# Patient Record
Sex: MALE | Race: BLACK OR AFRICAN AMERICAN | NOT HISPANIC OR LATINO | URBAN - METROPOLITAN AREA
[De-identification: names, ages, dates, MRNs, and addresses within clinical notes are randomized per-mention and may not be internally consistent; named-entity substitution may affect disease eponyms.]

---

## 2018-01-16 ENCOUNTER — INPATIENT (INPATIENT)
Facility: HOSPITAL | Age: 73
LOS: 7 days | Discharge: HOME | End: 2018-01-24
Attending: INTERNAL MEDICINE | Admitting: INTERNAL MEDICINE
Payer: MEDICARE

## 2018-01-16 VITALS — WEIGHT: 270.07 LBS | HEIGHT: 73 IN

## 2018-01-16 DIAGNOSIS — Z95.0 PRESENCE OF CARDIAC PACEMAKER: Chronic | ICD-10-CM

## 2018-01-16 DIAGNOSIS — Z98.89 OTHER SPECIFIED POSTPROCEDURAL STATES: Chronic | ICD-10-CM

## 2018-01-16 DIAGNOSIS — Z95.1 PRESENCE OF AORTOCORONARY BYPASS GRAFT: Chronic | ICD-10-CM

## 2018-01-16 LAB
ALBUMIN SERPL ELPH-MCNC: 3.8 G/DL — SIGNIFICANT CHANGE UP (ref 3.3–5)
ALP SERPL-CCNC: 91 U/L — SIGNIFICANT CHANGE UP (ref 40–120)
ALT FLD-CCNC: 34 U/L — SIGNIFICANT CHANGE UP (ref 12–78)
ANION GAP SERPL CALC-SCNC: 9 MMOL/L — SIGNIFICANT CHANGE UP (ref 5–17)
APTT BLD: 33.5 SEC — SIGNIFICANT CHANGE UP (ref 27.5–37.4)
AST SERPL-CCNC: 39 U/L — HIGH (ref 15–37)
BASE EXCESS BLDA CALC-SCNC: 1.5 MMOL/L — SIGNIFICANT CHANGE UP (ref -2–2)
BASOPHILS # BLD AUTO: 0.1 K/UL — SIGNIFICANT CHANGE UP (ref 0–0.2)
BASOPHILS NFR BLD AUTO: 0.8 % — SIGNIFICANT CHANGE UP (ref 0–2)
BILIRUB SERPL-MCNC: 0.5 MG/DL — SIGNIFICANT CHANGE UP (ref 0.2–1.2)
BLOOD GAS COMMENTS: SIGNIFICANT CHANGE UP
BLOOD GAS COMMENTS: SIGNIFICANT CHANGE UP
BLOOD GAS SOURCE: SIGNIFICANT CHANGE UP
BUN SERPL-MCNC: 40 MG/DL — HIGH (ref 7–23)
CALCIUM SERPL-MCNC: 8.2 MG/DL — LOW (ref 8.5–10.1)
CHLORIDE SERPL-SCNC: 103 MMOL/L — SIGNIFICANT CHANGE UP (ref 96–108)
CK MB BLD-MCNC: 0.6 % — SIGNIFICANT CHANGE UP (ref 0–3.5)
CK MB CFR SERPL CALC: 3.1 NG/ML — SIGNIFICANT CHANGE UP (ref 0.5–3.6)
CK SERPL-CCNC: 481 U/L — HIGH (ref 26–308)
CO2 SERPL-SCNC: 25 MMOL/L — SIGNIFICANT CHANGE UP (ref 22–31)
CREAT SERPL-MCNC: 2.1 MG/DL — HIGH (ref 0.5–1.3)
EOSINOPHIL # BLD AUTO: 0.1 K/UL — SIGNIFICANT CHANGE UP (ref 0–0.5)
EOSINOPHIL NFR BLD AUTO: 0.8 % — SIGNIFICANT CHANGE UP (ref 0–6)
GLUCOSE SERPL-MCNC: 358 MG/DL — HIGH (ref 70–99)
HCO3 BLDA-SCNC: 27 MMOL/L — SIGNIFICANT CHANGE UP (ref 21–29)
HCT VFR BLD CALC: 42.7 % — SIGNIFICANT CHANGE UP (ref 39–50)
HGB BLD-MCNC: 13.9 G/DL — SIGNIFICANT CHANGE UP (ref 13–17)
HOROWITZ INDEX BLDA+IHG-RTO: 80 — SIGNIFICANT CHANGE UP
INR BLD: 2.19 RATIO — HIGH (ref 0.88–1.16)
LYMPHOCYTES # BLD AUTO: 0.8 K/UL — LOW (ref 1–3.3)
LYMPHOCYTES # BLD AUTO: 7.4 % — LOW (ref 13–44)
MCHC RBC-ENTMCNC: 30.7 PG — SIGNIFICANT CHANGE UP (ref 27–34)
MCHC RBC-ENTMCNC: 32.6 GM/DL — SIGNIFICANT CHANGE UP (ref 32–36)
MCV RBC AUTO: 94.1 FL — SIGNIFICANT CHANGE UP (ref 80–100)
MONOCYTES # BLD AUTO: 0.6 K/UL — SIGNIFICANT CHANGE UP (ref 0–0.9)
MONOCYTES NFR BLD AUTO: 6 % — SIGNIFICANT CHANGE UP (ref 2–14)
NEUTROPHILS # BLD AUTO: 9.2 K/UL — HIGH (ref 1.8–7.4)
NEUTROPHILS NFR BLD AUTO: 85 % — HIGH (ref 43–77)
NT-PROBNP SERPL-SCNC: 2177 PG/ML — HIGH (ref 0–125)
PCO2 BLDA: 46 MMHG — SIGNIFICANT CHANGE UP (ref 32–46)
PH BLD: 7.38 — SIGNIFICANT CHANGE UP (ref 7.35–7.45)
PLATELET # BLD AUTO: 121 K/UL — LOW (ref 150–400)
PO2 BLDA: 269 MMHG — HIGH (ref 74–108)
POTASSIUM SERPL-MCNC: 4.6 MMOL/L — SIGNIFICANT CHANGE UP (ref 3.5–5.3)
POTASSIUM SERPL-SCNC: 4.6 MMOL/L — SIGNIFICANT CHANGE UP (ref 3.5–5.3)
PROT SERPL-MCNC: 8 GM/DL — SIGNIFICANT CHANGE UP (ref 6–8.3)
PROTHROM AB SERPL-ACNC: 24.3 SEC — HIGH (ref 9.8–12.7)
RBC # BLD: 4.54 M/UL — SIGNIFICANT CHANGE UP (ref 4.2–5.8)
RBC # FLD: 15.4 % — HIGH (ref 11–15)
SAO2 % BLDA: 99 % — HIGH (ref 92–96)
SODIUM SERPL-SCNC: 137 MMOL/L — SIGNIFICANT CHANGE UP (ref 135–145)
TROPONIN I SERPL-MCNC: 0.12 NG/ML — HIGH (ref 0.01–0.04)
WBC # BLD: 10.8 K/UL — HIGH (ref 3.8–10.5)
WBC # FLD AUTO: 10.8 K/UL — HIGH (ref 3.8–10.5)

## 2018-01-16 PROCEDURE — 71045 X-RAY EXAM CHEST 1 VIEW: CPT | Mod: 26

## 2018-01-16 PROCEDURE — 99291 CRITICAL CARE FIRST HOUR: CPT

## 2018-01-16 RX ORDER — NITROGLYCERIN 6.5 MG
20 CAPSULE, EXTENDED RELEASE ORAL
Qty: 50 | Refills: 0 | Status: DISCONTINUED | OUTPATIENT
Start: 2018-01-16 | End: 2018-01-17

## 2018-01-16 RX ORDER — FUROSEMIDE 40 MG
40 TABLET ORAL ONCE
Qty: 0 | Refills: 0 | Status: COMPLETED | OUTPATIENT
Start: 2018-01-16 | End: 2018-01-16

## 2018-01-16 RX ORDER — SODIUM CHLORIDE 9 MG/ML
3 INJECTION INTRAMUSCULAR; INTRAVENOUS; SUBCUTANEOUS ONCE
Qty: 0 | Refills: 0 | Status: COMPLETED | OUTPATIENT
Start: 2018-01-16 | End: 2018-01-16

## 2018-01-16 RX ORDER — ASPIRIN/CALCIUM CARB/MAGNESIUM 324 MG
325 TABLET ORAL ONCE
Qty: 0 | Refills: 0 | Status: COMPLETED | OUTPATIENT
Start: 2018-01-16 | End: 2018-01-16

## 2018-01-16 RX ADMIN — Medication 325 MILLIGRAM(S): at 22:50

## 2018-01-16 RX ADMIN — SODIUM CHLORIDE 3 MILLILITER(S): 9 INJECTION INTRAMUSCULAR; INTRAVENOUS; SUBCUTANEOUS at 22:14

## 2018-01-16 RX ADMIN — Medication 6 MICROGRAM(S)/MIN: at 22:14

## 2018-01-16 RX ADMIN — Medication 40 MILLIGRAM(S): at 22:50

## 2018-01-16 NOTE — ED ADULT NURSE NOTE - PSH
S/P CABG x 3  cabg3  in 2003  S/P cardiac cath  cardiac stentin 2011  S/P cardiac pacemaker procedure  defibrilator 2012  S/P hernia repair  hernia [id number]

## 2018-01-16 NOTE — ED ADULT NURSE NOTE - PMH
CHF (congestive heart failure)    COPD (chronic obstructive pulmonary disease)    DM (diabetes mellitus), type 2    High cholesterol    HTN (hypertension)    Pneumonia

## 2018-01-17 DIAGNOSIS — E11.9 TYPE 2 DIABETES MELLITUS WITHOUT COMPLICATIONS: ICD-10-CM

## 2018-01-17 DIAGNOSIS — E78.2 MIXED HYPERLIPIDEMIA: ICD-10-CM

## 2018-01-17 DIAGNOSIS — E66.01 MORBID (SEVERE) OBESITY DUE TO EXCESS CALORIES: ICD-10-CM

## 2018-01-17 DIAGNOSIS — G47.33 OBSTRUCTIVE SLEEP APNEA (ADULT) (PEDIATRIC): ICD-10-CM

## 2018-01-17 DIAGNOSIS — I16.1 HYPERTENSIVE EMERGENCY: ICD-10-CM

## 2018-01-17 DIAGNOSIS — I10 ESSENTIAL (PRIMARY) HYPERTENSION: ICD-10-CM

## 2018-01-17 DIAGNOSIS — I48.2 CHRONIC ATRIAL FIBRILLATION: ICD-10-CM

## 2018-01-17 DIAGNOSIS — I50.23 ACUTE ON CHRONIC SYSTOLIC (CONGESTIVE) HEART FAILURE: ICD-10-CM

## 2018-01-17 DIAGNOSIS — Z29.9 ENCOUNTER FOR PROPHYLACTIC MEASURES, UNSPECIFIED: ICD-10-CM

## 2018-01-17 LAB
ANION GAP SERPL CALC-SCNC: 9 MMOL/L — SIGNIFICANT CHANGE UP (ref 5–17)
BUN SERPL-MCNC: 45 MG/DL — HIGH (ref 7–23)
CALCIUM SERPL-MCNC: 8.6 MG/DL — SIGNIFICANT CHANGE UP (ref 8.5–10.1)
CHLORIDE SERPL-SCNC: 101 MMOL/L — SIGNIFICANT CHANGE UP (ref 96–108)
CHOLEST SERPL-MCNC: 124 MG/DL — SIGNIFICANT CHANGE UP (ref 10–199)
CK MB BLD-MCNC: 1.5 % — SIGNIFICANT CHANGE UP (ref 0–3.5)
CK MB BLD-MCNC: 1.7 % — SIGNIFICANT CHANGE UP (ref 0–3.5)
CK MB CFR SERPL CALC: 10.1 NG/ML — HIGH (ref 0.5–3.6)
CK MB CFR SERPL CALC: 8.6 NG/ML — HIGH (ref 0.5–3.6)
CK SERPL-CCNC: 495 U/L — HIGH (ref 26–308)
CK SERPL-CCNC: 659 U/L — HIGH (ref 26–308)
CO2 SERPL-SCNC: 28 MMOL/L — SIGNIFICANT CHANGE UP (ref 22–31)
CREAT SERPL-MCNC: 2.5 MG/DL — HIGH (ref 0.5–1.3)
GLUCOSE SERPL-MCNC: 279 MG/DL — HIGH (ref 70–99)
HBA1C BLD-MCNC: 9.8 % — HIGH (ref 4–5.6)
HCT VFR BLD CALC: 44 % — SIGNIFICANT CHANGE UP (ref 39–50)
HDLC SERPL-MCNC: 33 MG/DL — LOW (ref 40–125)
HGB BLD-MCNC: 14 G/DL — SIGNIFICANT CHANGE UP (ref 13–17)
LIPID PNL WITH DIRECT LDL SERPL: 73 MG/DL — SIGNIFICANT CHANGE UP
MCHC RBC-ENTMCNC: 29.8 PG — SIGNIFICANT CHANGE UP (ref 27–34)
MCHC RBC-ENTMCNC: 31.8 GM/DL — LOW (ref 32–36)
MCV RBC AUTO: 93.9 FL — SIGNIFICANT CHANGE UP (ref 80–100)
PLATELET # BLD AUTO: 137 K/UL — LOW (ref 150–400)
POTASSIUM SERPL-MCNC: 4.4 MMOL/L — SIGNIFICANT CHANGE UP (ref 3.5–5.3)
POTASSIUM SERPL-SCNC: 4.4 MMOL/L — SIGNIFICANT CHANGE UP (ref 3.5–5.3)
RBC # BLD: 4.69 M/UL — SIGNIFICANT CHANGE UP (ref 4.2–5.8)
RBC # FLD: 15.9 % — HIGH (ref 11–15)
SODIUM SERPL-SCNC: 138 MMOL/L — SIGNIFICANT CHANGE UP (ref 135–145)
TOTAL CHOLESTEROL/HDL RATIO MEASUREMENT: 3.8 RATIO — SIGNIFICANT CHANGE UP (ref 3.4–9.6)
TRIGL SERPL-MCNC: 91 MG/DL — SIGNIFICANT CHANGE UP (ref 10–149)
TROPONIN I SERPL-MCNC: 0.38 NG/ML — HIGH (ref 0.01–0.04)
TROPONIN I SERPL-MCNC: 0.42 NG/ML — HIGH (ref 0.01–0.04)
WBC # BLD: 10.7 K/UL — HIGH (ref 3.8–10.5)
WBC # FLD AUTO: 10.7 K/UL — HIGH (ref 3.8–10.5)

## 2018-01-17 PROCEDURE — 12345: CPT | Mod: NC

## 2018-01-17 PROCEDURE — 99223 1ST HOSP IP/OBS HIGH 75: CPT

## 2018-01-17 RX ORDER — FUROSEMIDE 40 MG
40 TABLET ORAL EVERY 12 HOURS
Qty: 0 | Refills: 0 | Status: DISCONTINUED | OUTPATIENT
Start: 2018-01-17 | End: 2018-01-24

## 2018-01-17 RX ORDER — DEXTROSE 50 % IN WATER 50 %
25 SYRINGE (ML) INTRAVENOUS ONCE
Qty: 0 | Refills: 0 | Status: DISCONTINUED | OUTPATIENT
Start: 2018-01-17 | End: 2018-01-24

## 2018-01-17 RX ORDER — METOPROLOL TARTRATE 50 MG
75 TABLET ORAL DAILY
Qty: 0 | Refills: 0 | Status: DISCONTINUED | OUTPATIENT
Start: 2018-01-17 | End: 2018-01-24

## 2018-01-17 RX ORDER — CHOLECALCIFEROL (VITAMIN D3) 125 MCG
400 CAPSULE ORAL DAILY
Qty: 0 | Refills: 0 | Status: DISCONTINUED | OUTPATIENT
Start: 2018-01-17 | End: 2018-01-24

## 2018-01-17 RX ORDER — DEXTROSE 50 % IN WATER 50 %
12.5 SYRINGE (ML) INTRAVENOUS ONCE
Qty: 0 | Refills: 0 | Status: DISCONTINUED | OUTPATIENT
Start: 2018-01-17 | End: 2018-01-24

## 2018-01-17 RX ORDER — SIMVASTATIN 20 MG/1
40 TABLET, FILM COATED ORAL AT BEDTIME
Qty: 0 | Refills: 0 | Status: DISCONTINUED | OUTPATIENT
Start: 2018-01-17 | End: 2018-01-24

## 2018-01-17 RX ORDER — SODIUM CHLORIDE 9 MG/ML
1000 INJECTION, SOLUTION INTRAVENOUS
Qty: 0 | Refills: 0 | Status: DISCONTINUED | OUTPATIENT
Start: 2018-01-17 | End: 2018-01-24

## 2018-01-17 RX ORDER — FAMOTIDINE 10 MG/ML
20 INJECTION INTRAVENOUS
Qty: 0 | Refills: 0 | Status: DISCONTINUED | OUTPATIENT
Start: 2018-01-17 | End: 2018-01-24

## 2018-01-17 RX ORDER — RIVAROXABAN 15 MG-20MG
20 KIT ORAL EVERY 24 HOURS
Qty: 0 | Refills: 0 | Status: DISCONTINUED | OUTPATIENT
Start: 2018-01-17 | End: 2018-01-24

## 2018-01-17 RX ORDER — SACUBITRIL AND VALSARTAN 24; 26 MG/1; MG/1
1 TABLET, FILM COATED ORAL
Qty: 0 | Refills: 0 | Status: DISCONTINUED | OUTPATIENT
Start: 2018-01-17 | End: 2018-01-24

## 2018-01-17 RX ORDER — ALLOPURINOL 300 MG
100 TABLET ORAL DAILY
Qty: 0 | Refills: 0 | Status: DISCONTINUED | OUTPATIENT
Start: 2018-01-17 | End: 2018-01-24

## 2018-01-17 RX ORDER — IPRATROPIUM/ALBUTEROL SULFATE 18-103MCG
3 AEROSOL WITH ADAPTER (GRAM) INHALATION EVERY 6 HOURS
Qty: 0 | Refills: 0 | Status: DISCONTINUED | OUTPATIENT
Start: 2018-01-17 | End: 2018-01-18

## 2018-01-17 RX ORDER — INSULIN LISPRO 100/ML
VIAL (ML) SUBCUTANEOUS AT BEDTIME
Qty: 0 | Refills: 0 | Status: DISCONTINUED | OUTPATIENT
Start: 2018-01-17 | End: 2018-01-24

## 2018-01-17 RX ORDER — FOLIC ACID 0.8 MG
1 TABLET ORAL DAILY
Qty: 0 | Refills: 0 | Status: DISCONTINUED | OUTPATIENT
Start: 2018-01-17 | End: 2018-01-24

## 2018-01-17 RX ORDER — DEXTROSE 50 % IN WATER 50 %
1 SYRINGE (ML) INTRAVENOUS ONCE
Qty: 0 | Refills: 0 | Status: DISCONTINUED | OUTPATIENT
Start: 2018-01-17 | End: 2018-01-24

## 2018-01-17 RX ORDER — TAMSULOSIN HYDROCHLORIDE 0.4 MG/1
0.4 CAPSULE ORAL AT BEDTIME
Qty: 0 | Refills: 0 | Status: DISCONTINUED | OUTPATIENT
Start: 2018-01-17 | End: 2018-01-24

## 2018-01-17 RX ORDER — ASPIRIN/CALCIUM CARB/MAGNESIUM 324 MG
325 TABLET ORAL DAILY
Qty: 0 | Refills: 0 | Status: DISCONTINUED | OUTPATIENT
Start: 2018-01-17 | End: 2018-01-18

## 2018-01-17 RX ORDER — INSULIN GLARGINE 100 [IU]/ML
53 INJECTION, SOLUTION SUBCUTANEOUS AT BEDTIME
Qty: 0 | Refills: 0 | Status: DISCONTINUED | OUTPATIENT
Start: 2018-01-17 | End: 2018-01-24

## 2018-01-17 RX ORDER — GLUCAGON INJECTION, SOLUTION 0.5 MG/.1ML
1 INJECTION, SOLUTION SUBCUTANEOUS ONCE
Qty: 0 | Refills: 0 | Status: DISCONTINUED | OUTPATIENT
Start: 2018-01-17 | End: 2018-01-24

## 2018-01-17 RX ORDER — OMEGA-3 ACID ETHYL ESTERS 1 G
2 CAPSULE ORAL
Qty: 0 | Refills: 0 | Status: DISCONTINUED | OUTPATIENT
Start: 2018-01-17 | End: 2018-01-17

## 2018-01-17 RX ORDER — INSULIN LISPRO 100/ML
VIAL (ML) SUBCUTANEOUS
Qty: 0 | Refills: 0 | Status: DISCONTINUED | OUTPATIENT
Start: 2018-01-17 | End: 2018-01-24

## 2018-01-17 RX ADMIN — SIMVASTATIN 40 MILLIGRAM(S): 20 TABLET, FILM COATED ORAL at 21:46

## 2018-01-17 RX ADMIN — Medication 100 MILLIGRAM(S): at 12:08

## 2018-01-17 RX ADMIN — FAMOTIDINE 20 MILLIGRAM(S): 10 INJECTION INTRAVENOUS at 17:23

## 2018-01-17 RX ADMIN — Medication 400 UNIT(S): at 12:08

## 2018-01-17 RX ADMIN — Medication 325 MILLIGRAM(S): at 12:08

## 2018-01-17 RX ADMIN — Medication 75 MILLIGRAM(S): at 06:51

## 2018-01-17 RX ADMIN — FAMOTIDINE 20 MILLIGRAM(S): 10 INJECTION INTRAVENOUS at 06:51

## 2018-01-17 RX ADMIN — RIVAROXABAN 20 MILLIGRAM(S): KIT at 17:23

## 2018-01-17 RX ADMIN — Medication 1: at 21:46

## 2018-01-17 RX ADMIN — INSULIN GLARGINE 53 UNIT(S): 100 INJECTION, SOLUTION SUBCUTANEOUS at 21:45

## 2018-01-17 RX ADMIN — Medication 1 MILLIGRAM(S): at 12:08

## 2018-01-17 RX ADMIN — TAMSULOSIN HYDROCHLORIDE 0.4 MILLIGRAM(S): 0.4 CAPSULE ORAL at 21:46

## 2018-01-17 RX ADMIN — SACUBITRIL AND VALSARTAN 1 TABLET(S): 24; 26 TABLET, FILM COATED ORAL at 06:51

## 2018-01-17 RX ADMIN — Medication 6: at 08:26

## 2018-01-17 RX ADMIN — Medication 40 MILLIGRAM(S): at 17:23

## 2018-01-17 RX ADMIN — Medication 40 MILLIGRAM(S): at 06:51

## 2018-01-17 RX ADMIN — SACUBITRIL AND VALSARTAN 1 TABLET(S): 24; 26 TABLET, FILM COATED ORAL at 17:22

## 2018-01-17 RX ADMIN — Medication 4: at 13:02

## 2018-01-17 NOTE — CONSULT NOTE ADULT - SUBJECTIVE AND OBJECTIVE BOX
HPI:  HPI:  72 year old man with PMH of CHF, COPD, CKD, EMBER on CPAP, DM2, HTN, HLD presents to ED with worsening SOB and leg swelling.  Patient has SOB at baseline which worsened tonight with swelling in both of his legs.  He denies chest pain, palpitation, lightheadedness, diaphoresis.  He is compliant with all medications and sees his doctors regularly.    In the ED, BNP 2177, troponin mildly elevated, EKG without ischemic changes; paced.  Labs consistent with CKD, leukocytosis without clear source of infection.  Had been HTN emergency in ED, was on nitro, drip stopped (2018 02:25)      Chief Complaint:  Patient is a 72y old  Male who presents with a chief complaint of Patient was complaining of difficulty breathing. (2018 11:56)      Review of Systems:    see above         Social History/Family History  SOCHX:   tobacco,  -  alcohol    FMHX: FA/MO  - contributory       Discussed with:  PMD, Family    Physical Exam:    Vital Signs:  Vital Signs Last 24 Hrs  T(C): 36.6 (2018 05:27), Max: 37.1 (2018 17:14)  T(F): 97.9 (2018 05:27), Max: 98.7 (2018 17:14)  HR: 61 (2018 05:27) (60 - 62)  BP: 113/61 (2018 05:27) (108/64 - 144/65)  BP(mean): --  RR: 18 (2018 05:27) (18 - 18)  SpO2: 97% (2018 05:27) (94% - 98%)  Daily     Daily Weight in k (2018 05:27)  I&O's Summary    2018 07:01  -  2018 07:00  --------------------------------------------------------  IN: 480 mL / OUT: 0 mL / NET: 480 mL          Chest:  decreased bases  Cardiovascular:  Regular rhythm, S1, S2, no murmur/rub/S3/S4, no carotid/femoral/abdominal bruit, radial/pedal pulses 2+, edema  Abdomen:  Soft, non-tender, non-distended, normoactive bowel sounds, no HSM      Laboratory:                          12.6   8.3   )-----------( 106      ( 2018 07:47 )             38.4     18    139  |  101  |  47<H>  ----------------------------<  186<H>  4.4   |  30  |  2.35<H>    Ca    8.2<L>      2018 07:47  Phos  2.8       Mg     2.2         TPro  7.5  /  Alb  3.4  /  TBili  0.9  /  DBili  x   /  AST  39<H>  /  ALT  28  /  AlkPhos  66  18    ABG - ( 2018 22:41 )  pH: x     /  pCO2: 46    /  pO2: 269   / HCO3: 27    / Base Excess: 1.5   /  SaO2: 99                CARDIAC MARKERS ( 2018 16:18 )  .382 ng/mL / x     / 659 U/L / x     / 10.1 ng/mL  CARDIAC MARKERS ( 2018 08:52 )  .425 ng/mL / x     / 495 U/L / x     / 8.6 ng/mL  CARDIAC MARKERS ( 2018 22:07 )  .125 ng/mL / x     / 481 U/L / x     / 3.1 ng/mL      CAPILLARY BLOOD GLUCOSE      POCT Blood Glucose.: 206 mg/dL (2018 08:05)  POCT Blood Glucose.: 294 mg/dL (2018 21:41)  POCT Blood Glucose.: 148 mg/dL (2018 17:21)  POCT Blood Glucose.: 218 mg/dL (2018 13:00)    LIVER FUNCTIONS - ( 2018 07:47 )  Alb: 3.4 g/dL / Pro: 7.5 gm/dL / ALK PHOS: 66 U/L / ALT: 28 U/L / AST: 39 U/L / GGT: x           PT/INR - ( 2018 23:35 )   PT: 24.3 sec;   INR: 2.19 ratio         PTT - ( 2018 23:35 )  PTT:33.5 sec          Assessment:  Acute on chronic systolic CHF, AICD in place  H/O CAD, last echo  EF 30%  Optimize meds, clinical improvement noted already  rEPEAT tte

## 2018-01-17 NOTE — ED PROVIDER NOTE - CRITICAL CARE PROVIDED
additional history taking/interpretation of diagnostic studies/documentation/direct patient care (not related to procedure)/consult w/ pt's family directly relating to pts condition

## 2018-01-17 NOTE — H&P ADULT - ASSESSMENT
72 year old man with PMH of CHF, COPD, CKD, EMBER on CPAP, DM2, HTN, HLD presents to ED with worsening SOB and leg swelling.  Signs, symptoms, and work up consistent with CHF exacerbation.  Patient will require admission for at least 2 midnights as detailed below:    IMPROVE VTE Individual Risk Assessment          RISK                                                          Points    [  ] Previous VTE                                                3    [  ] Thrombophilia                                             2    [  ] Lower limb paralysis                                    2        (unable to hold up >15 seconds)      [  ] Current Cancer                                             2         (within 6 months)    [x  ] Immobilization > 24 hrs                              1    [  ] ICU/CCU stay > 24 hours                            1    [ x ] Age > 60                                                    1    IMPROVE VTE Score ___2______

## 2018-01-17 NOTE — PROGRESS NOTE ADULT - ASSESSMENT
72 year old man with PMH of chronic Sys CHF, COPD, CKD, EMBER on CPAP, DM2, HTN, HLD presents w/ systolic CHF exacerbation.     Sys CHF exacerbation  - c/w telemetry monitoring  - CXR showed stable cardiomegaly w/ mild pulmonary vascular congestion  - c/w Lasix 40mg IVP Q12hrs but will lower dose if Cr continues to go up  - c/w Entresto, statin & metoprolol  - c/w Strict input/output & daily weight monitoring  - c/w continue BiPAP  - cardiac consult pending  - TTE pending    CAD status post CABG, PCI in 2011 and AICD  - cardiac enzymes borderline w/ trop 0.425, will continue to trend  - cardio consult pending  - c/w ASA    Leukocytosis  - may be reactive  - check UA    Hypertension   - c/w Enteresto & metoprolol    Questionable COPD history   c/w BiPAP & add PRN nebs      DM type 2   - c/w Lantus 53 units SC QHS & Sliding scale with coverage  - Hgb A1C was 9.8      HLD   - c/w simvastatin    Chronic atrial fibrillation  - c/w Xarelto & metoprolol    DVT Prophylaxis: Xarelto   GI prophylaxis: Pepcid

## 2018-01-17 NOTE — PROGRESS NOTE ADULT - SUBJECTIVE AND OBJECTIVE BOX
Patient is a 72y old Male w/ history of CHF, COPD, CKD, EMBER on CPAP, DM2, HTN & HLD who p/w systolic CHF exacerbation.     INTERVAL HPI/OVERNIGHT EVENTS:     MEDICATIONS  (STANDING):  allopurinol 100 milliGRAM(s) Oral daily  aspirin 325 milliGRAM(s) Oral daily  cholecalciferol 400 Unit(s) Oral daily  dextrose 5%. 1000 milliLiter(s) (50 mL/Hr) IV Continuous <Continuous>  dextrose 50% Injectable 12.5 Gram(s) IV Push once  dextrose 50% Injectable 25 Gram(s) IV Push once  dextrose 50% Injectable 25 Gram(s) IV Push once  famotidine    Tablet 20 milliGRAM(s) Oral two times a day  folic acid 1 milliGRAM(s) Oral daily  furosemide   Injectable 40 milliGRAM(s) IV Push every 12 hours  insulin glargine Injectable (LANTUS) 53 Unit(s) SubCutaneous at bedtime  insulin lispro (HumaLOG) corrective regimen sliding scale   SubCutaneous three times a day before meals  insulin lispro (HumaLOG) corrective regimen sliding scale   SubCutaneous at bedtime  metoprolol succinate ER 75 milliGRAM(s) Oral daily  rivaroxaban 20 milliGRAM(s) Oral every 24 hours  sacubitril 24 mG/valsartan 26 mG 1 Tablet(s) Oral two times a day  simvastatin 40 milliGRAM(s) Oral at bedtime  tamsulosin 0.4 milliGRAM(s) Oral at bedtime    MEDICATIONS  (PRN):  dextrose Gel 1 Dose(s) Oral once PRN Blood Glucose LESS THAN 70 milliGRAM(s)/deciliter  glucagon  Injectable 1 milliGRAM(s) IntraMuscular once PRN Glucose LESS THAN 70 milligrams/deciliter      Allergies    No Known Allergies    Intolerances    Vital Signs Last 24 Hrs  T(C): 36.9 (17 Jan 2018 11:19), Max: 37.1 (17 Jan 2018 02:36)  T(F): 98.5 (17 Jan 2018 11:19), Max: 98.8 (17 Jan 2018 02:36)  HR: 60 (17 Jan 2018 13:00) (39 - 60)  BP: 108/64 (17 Jan 2018 11:19) (108/64 - 163/67)  BP(mean): --  RR: 18 (17 Jan 2018 11:19) (18 - 20)  SpO2: 98% (17 Jan 2018 13:00) (94% - 98%)    PHYSICAL EXAM:  GENERAL: NAD, well-groomed, well-developed  HEAD:  Atraumatic, Normocephalic  EYES: EOMI, PERRLA, conjunctiva and sclera clear  ENMT: No tonsillar erythema, exudates, or enlargement; Moist mucous membranes, Good dentition, No lesions  NECK: Supple, No JVD, Normal thyroid  NERVOUS SYSTEM:  Alert & Oriented X3, Good concentration; Motor Strength 5/5 B/L upper and lower extremities; DTRs 2+ intact and symmetric  CHEST/LUNG: B/l wheezing and decreased breath sounds  HEART: Regular rate and rhythm  ABDOMEN: Soft, Nontender, mildly distended; Bowel sounds present  EXTREMITIES: B/l pitting edema extending above the knee   LYMPH: No lymphadenopathy noted  SKIN: No rashes or lesions    LABS:                      14.0   10.7  )-----------( 137      ( 17 Jan 2018 08:52 )             44.0     01-17    138  |  101  |  45<H>  ----------------------------<  279<H>  4.4   |  28  |  2.50<H>    Ca    8.6      17 Jan 2018 08:52    TPro  8.0  /  Alb  3.8  /  TBili  0.5  /  DBili  x   /  AST  39<H>  /  ALT  34  /  AlkPhos  91  01-16    PT/INR - ( 16 Jan 2018 23:35 )   PT: 24.3 sec;   INR: 2.19 ratio       PTT - ( 16 Jan 2018 23:35 )  PTT:33.5 sec    CAPILLARY BLOOD GLUCOSE    POCT Blood Glucose.: 218 mg/dL (17 Jan 2018 13:00)  POCT Blood Glucose.: 283 mg/dL (17 Jan 2018 08:01)      RADIOLOGY & ADDITIONAL TESTS:    Imaging Personally Reviewed:  [ ] YES  [ ] NO    Consultant(s) Notes Reviewed:  [ ] YES  [ ] NO    Care Discussed with Consultants/Other Providers [ ] YES  [ ] NO

## 2018-01-17 NOTE — ED PROVIDER NOTE - OBJECTIVE STATEMENT
72yoM; with pmh signif for CHF, COPD, DM, HTN, HLD; now p/w sob--progressively worsening x2-3 days, with LE swelling but acutely worsening tonight with diaphoresis. denies chest pain. c/o palpitations. biba on BiPAP s/p NTG x3.  denies cough. denies f/c/s. denies sick contacts. denies travel. denies trauma. smokes 2-3 cigarettes daily.    PMH:  CHF, COPD, DM, HTN, HLD  SOCiAL: No EtOH/+tobacco/denies illicit substance use  ROS: denies fever, chills, ; denies visual changes or eye pain or discharge; denies sore throat, rhinorrhea, tinnitus, ear pain, hearing changes; denies abd pain, n/v/d; denies cp/; denies cough/sputum production; denies back pain, neck pain, muscle aches; denies dysuria, hematuria, frequency, urgency; denies headache; denies numbness/tingling/weakness; denies changes in neurologic status/function; denies rashes  +sweats, sob, palp

## 2018-01-17 NOTE — ED PROVIDER NOTE - PSH
S/P CABG x 3  cabg3  in 2003  S/P cardiac cath  cardiac stentin 2011  S/P cardiac pacemaker procedure  defibrilator 2012  S/P hernia repair  hernia [id number]

## 2018-01-17 NOTE — H&P ADULT - HISTORY OF PRESENT ILLNESS
72yoM; with pmh signif for CHF, COPD, DM, HTN, HLD 72 year old man with PMH of CHF, COPD, EMBER on CPAP, DM2, HTN, HLD presents to ED with worsening SOB and leg swelling.  Patient has SOB at baseline which worsened tonight with swelling in both of his legs.  He denies chest pain, palpitation, lightheadedness, diaphoresis.  He is compliant with all medications and sees his doctors regularly. 72 year old man with PMH of CHF, COPD, CKD, EMBER on CPAP, DM2, HTN, HLD presents to ED with worsening SOB and leg swelling.  Patient has SOB at baseline which worsened tonight with swelling in both of his legs.  He denies chest pain, palpitation, lightheadedness, diaphoresis.  He is compliant with all medications and sees his doctors regularly.    In the ED, BNP 2177, troponin mildly elevated, EKG without ischemic changes; paced.  Labs consistent with CKD, leukocytosis without clear source of infection.  Had been HTN emergency in ED, was on nitro, drip stopped

## 2018-01-17 NOTE — H&P ADULT - NSHPREVIEWOFSYSTEMS_GEN_ALL_CORE
No fever/chills, No photophobia/eye pain/changes in vision,  No chest pain/palpitations, No abdominal pain, No N/V/D, no dysuria/frequency/discharge, No neck/back pain, no rash, no changes in neurological status/function.

## 2018-01-17 NOTE — H&P ADULT - NSHPLABSRESULTS_GEN_ALL_CORE
Vital Signs Last 24 Hrs  T(C): 37.1 (17 Jan 2018 02:36), Max: 37.1 (17 Jan 2018 02:36)  T(F): 98.8 (17 Jan 2018 02:36), Max: 98.8 (17 Jan 2018 02:36)  HR: 60 (17 Jan 2018 02:36) (60 - 60)  BP: 152/73 (17 Jan 2018 02:36) (112/58 - 152/73)  BP(mean): --  RR: 20 (17 Jan 2018 02:36) (20 - 20)  SpO2: 96% (17 Jan 2018 02:36) (94% - 97%)        LABS:                        13.9   10.8  )-----------( 121      ( 16 Jan 2018 22:07 )             42.7     01-16    137  |  103  |  40<H>  ----------------------------<  358<H>  4.6   |  25  |  2.10<H>    Ca    8.2<L>      16 Jan 2018 22:07    TPro  8.0  /  Alb  3.8  /  TBili  0.5  /  DBili  x   /  AST  39<H>  /  ALT  34  /  AlkPhos  91  01-16    PT/INR - ( 16 Jan 2018 23:35 )   PT: 24.3 sec;   INR: 2.19 ratio         PTT - ( 16 Jan 2018 23:35 )  PTT:33.5 sec      RADIOLOGY & ADDITIONAL STUDIES:    TTE 2015:   IMPRESSION:  Summary:   1. Left ventricular ejection fraction, by visual estimation, is 30 to   35%.   2. There is mild concentric left ventricular hypertrophy.   3. Moderate aortic regurgitation.   4. Sclerotic aortic valve with normal opening.   5. Peak transaortic gradient equals 21.0 mmHg, mean transaortic gradient   equals 11.5 mmHg, the calculated aortic valve area equals 1.04 cm² by the   continuity equation consistent with moderate aortic stenosis.    CXR:   Pulmonary vascular congestion, official read pending

## 2018-01-17 NOTE — H&P ADULT - NSHPPHYSICALEXAM_GEN_ALL_CORE
GENERAL: NAD, well-groomed, obese  HEAD:  Atraumatic, Normocephalic  EYES: EOMI, PERRLA, conjunctiva and sclera clear  ENMT: No tonsillar erythema, exudates, or enlargement; Moist mucous membranes, No lesions  NECK: Supple, No JVD, Normal thyroid  NERVOUS SYSTEM:  Alert & Oriented X3, Good concentration  CHEST/LUNG: Clear to ascultation bilaterally; No rales, rhonchi, wheezing, or rubs  HEART: Regular rate and rhythm; No murmurs, rubs, or gallops  ABDOMEN: Soft, Nontender, distended; Bowel sounds present  EXTREMITIES: b/l 2+ pitting edema LE  SKIN: no rashes or lesions   PSYCH: normal affect and behavior

## 2018-01-17 NOTE — ED PROVIDER NOTE - PHYSICAL EXAMINATION
General:     NAD, well-nourished, well-appearing  Head:     NC/AT, EOMI, oral mucosa moist  Neck:     trachea midline  Lungs:  bibasilar crackles, retractions, nasal flaring, tachypneic  CVS:   tachycardic, no m/g/r  Abd:     +BS, s/nt/nd, no organomegaly  Ext:    2+ radial and pedal pulses, 1+ pedal edema  Neuro: AAOx3, no sensory/motor deficits

## 2018-01-17 NOTE — ED ADULT NURSE REASSESSMENT NOTE - NS ED NURSE REASSESS COMMENT FT1
2345hrs - nitro drip stopped,  as per Dr. Eduardo, not warranted at this time.  will continue to monitor
pt stable, no acute distressed noted, wife at bedside.

## 2018-01-18 LAB
ALBUMIN SERPL ELPH-MCNC: 3.4 G/DL — SIGNIFICANT CHANGE UP (ref 3.3–5)
ALP SERPL-CCNC: 66 U/L — SIGNIFICANT CHANGE UP (ref 40–120)
ALT FLD-CCNC: 28 U/L — SIGNIFICANT CHANGE UP (ref 12–78)
ANION GAP SERPL CALC-SCNC: 8 MMOL/L — SIGNIFICANT CHANGE UP (ref 5–17)
APPEARANCE UR: CLEAR — SIGNIFICANT CHANGE UP
AST SERPL-CCNC: 39 U/L — HIGH (ref 15–37)
BILIRUB SERPL-MCNC: 0.9 MG/DL — SIGNIFICANT CHANGE UP (ref 0.2–1.2)
BILIRUB UR-MCNC: NEGATIVE — SIGNIFICANT CHANGE UP
BUN SERPL-MCNC: 47 MG/DL — HIGH (ref 7–23)
CALCIUM SERPL-MCNC: 8.2 MG/DL — LOW (ref 8.5–10.1)
CHLORIDE SERPL-SCNC: 101 MMOL/L — SIGNIFICANT CHANGE UP (ref 96–108)
CO2 SERPL-SCNC: 30 MMOL/L — SIGNIFICANT CHANGE UP (ref 22–31)
COLOR SPEC: YELLOW — SIGNIFICANT CHANGE UP
CREAT SERPL-MCNC: 2.35 MG/DL — HIGH (ref 0.5–1.3)
DIFF PNL FLD: ABNORMAL
FLUAV SPEC QL CULT: NEGATIVE — SIGNIFICANT CHANGE UP
FLUBV AG SPEC QL IA: NEGATIVE — SIGNIFICANT CHANGE UP
GLUCOSE SERPL-MCNC: 186 MG/DL — HIGH (ref 70–99)
GLUCOSE UR QL: NEGATIVE MG/DL — SIGNIFICANT CHANGE UP
HCT VFR BLD CALC: 38.4 % — LOW (ref 39–50)
HGB BLD-MCNC: 12.6 G/DL — LOW (ref 13–17)
KETONES UR-MCNC: NEGATIVE — SIGNIFICANT CHANGE UP
LEUKOCYTE ESTERASE UR-ACNC: NEGATIVE — SIGNIFICANT CHANGE UP
MAGNESIUM SERPL-MCNC: 2.2 MG/DL — SIGNIFICANT CHANGE UP (ref 1.6–2.6)
MCHC RBC-ENTMCNC: 30.8 PG — SIGNIFICANT CHANGE UP (ref 27–34)
MCHC RBC-ENTMCNC: 32.6 GM/DL — SIGNIFICANT CHANGE UP (ref 32–36)
MCV RBC AUTO: 94.5 FL — SIGNIFICANT CHANGE UP (ref 80–100)
NITRITE UR-MCNC: NEGATIVE — SIGNIFICANT CHANGE UP
PH UR: 5 — SIGNIFICANT CHANGE UP (ref 5–8)
PHOSPHATE SERPL-MCNC: 2.8 MG/DL — SIGNIFICANT CHANGE UP (ref 2.5–4.5)
PLATELET # BLD AUTO: 106 K/UL — LOW (ref 150–400)
POTASSIUM SERPL-MCNC: 4.4 MMOL/L — SIGNIFICANT CHANGE UP (ref 3.5–5.3)
POTASSIUM SERPL-SCNC: 4.4 MMOL/L — SIGNIFICANT CHANGE UP (ref 3.5–5.3)
PROT SERPL-MCNC: 7.5 GM/DL — SIGNIFICANT CHANGE UP (ref 6–8.3)
PROT UR-MCNC: NEGATIVE MG/DL — SIGNIFICANT CHANGE UP
RBC # BLD: 4.07 M/UL — LOW (ref 4.2–5.8)
RBC # FLD: 16 % — HIGH (ref 11–15)
RBC CASTS # UR COMP ASSIST: SIGNIFICANT CHANGE UP /HPF (ref 0–4)
SODIUM SERPL-SCNC: 139 MMOL/L — SIGNIFICANT CHANGE UP (ref 135–145)
SP GR SPEC: 1.01 — SIGNIFICANT CHANGE UP (ref 1.01–1.02)
UROBILINOGEN FLD QL: NEGATIVE MG/DL — SIGNIFICANT CHANGE UP
WBC # BLD: 8.3 K/UL — SIGNIFICANT CHANGE UP (ref 3.8–10.5)
WBC # FLD AUTO: 8.3 K/UL — SIGNIFICANT CHANGE UP (ref 3.8–10.5)

## 2018-01-18 PROCEDURE — 71045 X-RAY EXAM CHEST 1 VIEW: CPT | Mod: 26

## 2018-01-18 PROCEDURE — 99233 SBSQ HOSP IP/OBS HIGH 50: CPT

## 2018-01-18 RX ORDER — ASPIRIN/CALCIUM CARB/MAGNESIUM 324 MG
81 TABLET ORAL DAILY
Qty: 0 | Refills: 0 | Status: DISCONTINUED | OUTPATIENT
Start: 2018-01-18 | End: 2018-01-24

## 2018-01-18 RX ORDER — DOCUSATE SODIUM 100 MG
100 CAPSULE ORAL THREE TIMES A DAY
Qty: 0 | Refills: 0 | Status: DISCONTINUED | OUTPATIENT
Start: 2018-01-18 | End: 2018-01-24

## 2018-01-18 RX ORDER — AZITHROMYCIN 500 MG/1
500 TABLET, FILM COATED ORAL EVERY 24 HOURS
Qty: 0 | Refills: 0 | Status: DISCONTINUED | OUTPATIENT
Start: 2018-01-18 | End: 2018-01-19

## 2018-01-18 RX ORDER — IPRATROPIUM/ALBUTEROL SULFATE 18-103MCG
3 AEROSOL WITH ADAPTER (GRAM) INHALATION EVERY 6 HOURS
Qty: 0 | Refills: 0 | Status: DISCONTINUED | OUTPATIENT
Start: 2018-01-18 | End: 2018-01-24

## 2018-01-18 RX ORDER — SENNA PLUS 8.6 MG/1
2 TABLET ORAL AT BEDTIME
Qty: 0 | Refills: 0 | Status: DISCONTINUED | OUTPATIENT
Start: 2018-01-18 | End: 2018-01-24

## 2018-01-18 RX ORDER — CEFTRIAXONE 500 MG/1
1 INJECTION, POWDER, FOR SOLUTION INTRAMUSCULAR; INTRAVENOUS EVERY 24 HOURS
Qty: 0 | Refills: 0 | Status: DISCONTINUED | OUTPATIENT
Start: 2018-01-19 | End: 2018-01-19

## 2018-01-18 RX ORDER — CEFTRIAXONE 500 MG/1
1 INJECTION, POWDER, FOR SOLUTION INTRAMUSCULAR; INTRAVENOUS ONCE
Qty: 0 | Refills: 0 | Status: COMPLETED | OUTPATIENT
Start: 2018-01-18 | End: 2018-01-18

## 2018-01-18 RX ORDER — CEFTRIAXONE 500 MG/1
INJECTION, POWDER, FOR SOLUTION INTRAMUSCULAR; INTRAVENOUS
Qty: 0 | Refills: 0 | Status: DISCONTINUED | OUTPATIENT
Start: 2018-01-18 | End: 2018-01-19

## 2018-01-18 RX ADMIN — CEFTRIAXONE 100 GRAM(S): 500 INJECTION, POWDER, FOR SOLUTION INTRAMUSCULAR; INTRAVENOUS at 20:05

## 2018-01-18 RX ADMIN — Medication 75 MILLIGRAM(S): at 06:33

## 2018-01-18 RX ADMIN — Medication 100 MILLIGRAM(S): at 14:37

## 2018-01-18 RX ADMIN — Medication 100 MILLIGRAM(S): at 12:20

## 2018-01-18 RX ADMIN — FAMOTIDINE 20 MILLIGRAM(S): 10 INJECTION INTRAVENOUS at 05:11

## 2018-01-18 RX ADMIN — Medication 400 UNIT(S): at 12:20

## 2018-01-18 RX ADMIN — TAMSULOSIN HYDROCHLORIDE 0.4 MILLIGRAM(S): 0.4 CAPSULE ORAL at 21:11

## 2018-01-18 RX ADMIN — RIVAROXABAN 20 MILLIGRAM(S): KIT at 17:23

## 2018-01-18 RX ADMIN — SENNA PLUS 2 TABLET(S): 8.6 TABLET ORAL at 21:10

## 2018-01-18 RX ADMIN — Medication 40 MILLIGRAM(S): at 17:23

## 2018-01-18 RX ADMIN — Medication 81 MILLIGRAM(S): at 12:21

## 2018-01-18 RX ADMIN — Medication 4: at 08:07

## 2018-01-18 RX ADMIN — Medication 100 MILLIGRAM(S): at 21:10

## 2018-01-18 RX ADMIN — Medication 3 MILLILITER(S): at 12:41

## 2018-01-18 RX ADMIN — FAMOTIDINE 20 MILLIGRAM(S): 10 INJECTION INTRAVENOUS at 17:23

## 2018-01-18 RX ADMIN — Medication 40 MILLIGRAM(S): at 05:10

## 2018-01-18 RX ADMIN — SACUBITRIL AND VALSARTAN 1 TABLET(S): 24; 26 TABLET, FILM COATED ORAL at 05:10

## 2018-01-18 RX ADMIN — Medication 3 MILLILITER(S): at 17:07

## 2018-01-18 RX ADMIN — SIMVASTATIN 40 MILLIGRAM(S): 20 TABLET, FILM COATED ORAL at 21:10

## 2018-01-18 RX ADMIN — SACUBITRIL AND VALSARTAN 1 TABLET(S): 24; 26 TABLET, FILM COATED ORAL at 17:24

## 2018-01-18 RX ADMIN — AZITHROMYCIN 255 MILLIGRAM(S): 500 TABLET, FILM COATED ORAL at 20:08

## 2018-01-18 RX ADMIN — INSULIN GLARGINE 53 UNIT(S): 100 INJECTION, SOLUTION SUBCUTANEOUS at 21:11

## 2018-01-18 RX ADMIN — Medication 1 MILLIGRAM(S): at 12:20

## 2018-01-18 RX ADMIN — Medication 3 MILLILITER(S): at 23:03

## 2018-01-18 NOTE — PROGRESS NOTE ADULT - SUBJECTIVE AND OBJECTIVE BOX
Patient is a 72y old Male w/ history of CHF, COPD, CKD, EMBER on CPAP, DM2, HTN & HLD who p/w systolic CHF exacerbation.     MEDICATIONS  (STANDING):  allopurinol 100 milliGRAM(s) Oral daily  aspirin 325 milliGRAM(s) Oral daily  cholecalciferol 400 Unit(s) Oral daily  dextrose 5%. 1000 milliLiter(s) (50 mL/Hr) IV Continuous <Continuous>  dextrose 50% Injectable 12.5 Gram(s) IV Push once  dextrose 50% Injectable 25 Gram(s) IV Push once  dextrose 50% Injectable 25 Gram(s) IV Push once  famotidine    Tablet 20 milliGRAM(s) Oral two times a day  folic acid 1 milliGRAM(s) Oral daily  furosemide   Injectable 40 milliGRAM(s) IV Push every 12 hours  insulin glargine Injectable (LANTUS) 53 Unit(s) SubCutaneous at bedtime  insulin lispro (HumaLOG) corrective regimen sliding scale   SubCutaneous three times a day before meals  insulin lispro (HumaLOG) corrective regimen sliding scale   SubCutaneous at bedtime  metoprolol succinate ER 75 milliGRAM(s) Oral daily  rivaroxaban 20 milliGRAM(s) Oral every 24 hours  sacubitril 24 mG/valsartan 26 mG 1 Tablet(s) Oral two times a day  simvastatin 40 milliGRAM(s) Oral at bedtime  tamsulosin 0.4 milliGRAM(s) Oral at bedtime    MEDICATIONS  (PRN):  ALBUTerol/ipratropium for Nebulization 3 milliLiter(s) Nebulizer every 6 hours PRN Shortness of Breath and/or Wheezing  dextrose Gel 1 Dose(s) Oral once PRN Blood Glucose LESS THAN 70 milliGRAM(s)/deciliter  glucagon  Injectable 1 milliGRAM(s) IntraMuscular once PRN Glucose LESS THAN 70 milligrams/deciliter      Allergies    No Known Allergies    Vital Signs Last 24 Hrs  T(C): 36.6 (18 Jan 2018 05:27), Max: 37.1 (17 Jan 2018 17:14)  T(F): 97.9 (18 Jan 2018 05:27), Max: 98.7 (17 Jan 2018 17:14)  HR: 61 (18 Jan 2018 05:27) (60 - 62)  BP: 113/61 (18 Jan 2018 05:27) (108/64 - 144/65)  BP(mean): --  RR: 18 (18 Jan 2018 05:27) (18 - 18)  SpO2: 97% (18 Jan 2018 05:27) (94% - 98%)    PHYSICAL EXAM:  GENERAL: NAD, well-groomed, well-developed  HEAD:  Atraumatic, Normocephalic  EYES: EOMI, PERRLA, conjunctiva and sclera clear  ENMT: No tonsillar erythema, exudates, or enlargement; Moist mucous membranes, Good dentition, No lesions  NECK: Supple, No JVD, Normal thyroid  NERVOUS SYSTEM:  Alert & Oriented X3, Good concentration; Motor Strength 5/5 B/L upper and lower extremities; DTRs 2+ intact and symmetric  CHEST/LUNG: Decreased BS b/l  HEART: Regular rate and rhythm; No murmurs, rubs, or gallops  ABDOMEN: Soft, Nontender, Nondistended; Bowel sounds present  EXTREMITIES:  bilateral edema going above knees    LABS:                        12.6   8.3   )-----------( 106      ( 18 Jan 2018 07:47 )             38.4     01-18    139  |  101  |  47<H>  ----------------------------<  186<H>  4.4   |  30  |  2.35<H>    Ca    8.2<L>      18 Jan 2018 07:47  Phos  2.8     01-18  Mg     2.2     01-18    TPro  7.5  /  Alb  3.4  /  TBili  0.9  /  DBili  x   /  AST  39<H>  /  ALT  28  /  AlkPhos  66  01-18    PT/INR - ( 16 Jan 2018 23:35 )   PT: 24.3 sec;   INR: 2.19 ratio         PTT - ( 16 Jan 2018 23:35 )  PTT:33.5 sec    CAPILLARY BLOOD GLUCOSE      POCT Blood Glucose.: 206 mg/dL (18 Jan 2018 08:05)  POCT Blood Glucose.: 294 mg/dL (17 Jan 2018 21:41)  POCT Blood Glucose.: 148 mg/dL (17 Jan 2018 17:21)  POCT Blood Glucose.: 218 mg/dL (17 Jan 2018 13:00)      RADIOLOGY & ADDITIONAL TESTS:    Imaging Personally Reviewed:  [ ] YES  [ ] NO    Consultant(s) Notes Reviewed:  [ ] YES  [ ] NO    Care Discussed with Consultants/Other Providers [ ] YES  [ ] NO

## 2018-01-18 NOTE — PROGRESS NOTE ADULT - SUBJECTIVE AND OBJECTIVE BOX
Assessment:  Acute on chronic systolic CHF, AICD in place  H/O CAD, last echo 2015 EF 30%  Optimize meds, clinical improvement noted already  Repeat TTE  Doing well with aggressive diuresis

## 2018-01-18 NOTE — CHART NOTE - NSCHARTNOTEFT_GEN_A_CORE
Called to see patient for new fever 101.6 patient reports +productive cough, +SOB  Patient denies chills, malaise, NVD     HPI:  72 year old man with PMH of CHF, COPD, CKD, EMBER on CPAP, DM2, HTN, HLD presents to ED with worsening SOB and leg swelling.  Patient has SOB at baseline which worsened tonight with swelling in both of his legs.  He denies chest pain, palpitation, lightheadedness, diaphoresis.  He is compliant with all medications and sees his doctors regularly.    In the ED, BNP 2177, troponin mildly elevated, EKG without ischemic changes; paced.  Labs consistent with CKD, leukocytosis without clear source of infection.  Had been HTN emergency in ED, was on nitro, drip stopped (17 Jan 2018 02:25)    Vital Signs Last 24 Hrs  T(C): 38.7 (18 Jan 2018 17:25), Max: 38.7 (18 Jan 2018 17:25)  T(F): 101.6 (18 Jan 2018 17:25), Max: 101.6 (18 Jan 2018 17:25)  HR: 64 (18 Jan 2018 17:25) (60 - 67)  BP: 148/62 (18 Jan 2018 17:25) (113/61 - 148/62)  BP(mean): --  RR: 16 (18 Jan 2018 17:25) (16 - 18)  SpO2: 93% (18 Jan 2018 17:25) (93% - 98%)                          12.6   8.3   )-----------( 106      ( 18 Jan 2018 07:47 )             38.4     01-18    139  |  101  |  47<H>  ----------------------------<  186<H>  4.4   |  30  |  2.35<H>    Ca    8.2<L>      18 Jan 2018 07:47  Phos  2.8     01-18  Mg     2.2     01-18    TPro  7.5  /  Alb  3.4  /  TBili  0.9  /  DBili  x   /  AST  39<H>  /  ALT  28  /  AlkPhos  66  01-18    PT/INR - ( 16 Jan 2018 23:35 )   PT: 24.3 sec;   INR: 2.19 ratio         PTT - ( 16 Jan 2018 23:35 )  PTT:33.5 sec    CAPILLARY BLOOD GLUCOSE      POCT Blood Glucose.: 150 mg/dL (18 Jan 2018 17:14)  POCT Blood Glucose.: 128 mg/dL (18 Jan 2018 12:52)  POCT Blood Glucose.: 206 mg/dL (18 Jan 2018 08:05)  POCT Blood Glucose.: 294 mg/dL (17 Jan 2018 21:41)    GENERAL: NAD, well-groomed, well-developed  HEAD:  Atraumatic, Normocephalic  NERVOUS SYSTEM:  Alert & Oriented X3, Good concentration; Motor Strength 5/5 B/L upper and lower extremities; DTRs 2+ intact and symmetric  CHEST/LUNG: +wheezing   HEART: Regular rate and rhythm;   ABDOMEN: Soft, Nontender, Nondistended; Bowel sounds present  EXTREMITIES:  + generalized edema  LYMPH: No lymphadenopathy noted  SKIN: No rashes or lesions< from: Xray Chest 1 View AP/PA. (01.16.18 @ 22:49) >    FINDINGS:   S/P sternotomy; CABG.  Left-sided AICD is in situ.  There is stable cardiomegaly.  There is interval mild improvement in pulmonary vascular congestive   changes since 10/14/15 exam.  Interval improvement in bilateral lower lung opacities - these are either   atelectases but pneumonia not excluded in the right clinical setting.  No significant pleural effusion.  No pneumothorax.  There are degenerative changes of the thoracic spine.     IMPRESSION:   1. Stable cardiomegaly.  1. Mild pulmonary vascular congestive changes noted.  2. Since 10/14/15 exam, interval improvement in bibasilar atelectases   and/or pneumonia.    < end of copied text >          Plan  -Pan culture  -r/o flu/rvp   -continue duoneb  --CXR  -will start ABX     -discussed with DR Barbour

## 2018-01-18 NOTE — PROGRESS NOTE ADULT - ASSESSMENT
72 year old man with PMH of chronic Sys CHF, COPD, CKD, EMBER on CPAP, DM2, HTN, HLD presents w/ systolic CHF exacerbation.     Sys CHF exacerbation  - c/w telemetry monitoring  - CXR showed stable cardiomegaly w/ mild pulmonary vascular congestion  - c/w Lasix 40mg IVP Q12hrs as Cr is stable  - c/w Entresto, statin & metoprolol  - c/w Strict input/output & daily weight monitoring, patient has lost ~ 1lb since yesterday  - c/w continue BiPAP  - cardiac consult pending  - TTE pending    CAD status post CABG, PCI in 2011 and AICD  - cardiac enzymes borderline w/ trop 0.425, will continue to trend  - cardio consult pending  - c/w ASA    Leukocytosis  - resolved    Hypertension   - c/w Enteresto & metoprolol    Questionable COPD history   c/w BiPAP & add PRN nebs      DM type 2   - c/w Lantus 53 units SC QHS & Sliding scale with coverage  - Hgb A1C was 9.8      HLD   - c/w simvastatin    Constipation  - add colace and senna    Chronic atrial fibrillation  - c/w Xarelto & metoprolol    DVT Prophylaxis: Xarelto   GI prophylaxis: Pepcid

## 2018-01-19 LAB
ANION GAP SERPL CALC-SCNC: 8 MMOL/L — SIGNIFICANT CHANGE UP (ref 5–17)
BASOPHILS # BLD AUTO: 0.1 K/UL — SIGNIFICANT CHANGE UP (ref 0–0.2)
BASOPHILS NFR BLD AUTO: 1.2 % — SIGNIFICANT CHANGE UP (ref 0–2)
BUN SERPL-MCNC: 49 MG/DL — HIGH (ref 7–23)
CALCIUM SERPL-MCNC: 8.2 MG/DL — LOW (ref 8.5–10.1)
CHLORIDE SERPL-SCNC: 101 MMOL/L — SIGNIFICANT CHANGE UP (ref 96–108)
CO2 SERPL-SCNC: 33 MMOL/L — HIGH (ref 22–31)
CREAT SERPL-MCNC: 2.2 MG/DL — HIGH (ref 0.5–1.3)
CRP SERPL-MCNC: 5.1 MG/DL — HIGH (ref 0–0.4)
EOSINOPHIL # BLD AUTO: 0.1 K/UL — SIGNIFICANT CHANGE UP (ref 0–0.5)
EOSINOPHIL NFR BLD AUTO: 1.1 % — SIGNIFICANT CHANGE UP (ref 0–6)
GLUCOSE SERPL-MCNC: 84 MG/DL — SIGNIFICANT CHANGE UP (ref 70–99)
HCT VFR BLD CALC: 39.3 % — SIGNIFICANT CHANGE UP (ref 39–50)
HGB BLD-MCNC: 12.3 G/DL — LOW (ref 13–17)
LYMPHOCYTES # BLD AUTO: 1.2 K/UL — SIGNIFICANT CHANGE UP (ref 1–3.3)
LYMPHOCYTES # BLD AUTO: 15.9 % — SIGNIFICANT CHANGE UP (ref 13–44)
MAGNESIUM SERPL-MCNC: 2.3 MG/DL — SIGNIFICANT CHANGE UP (ref 1.6–2.6)
MCHC RBC-ENTMCNC: 29.3 PG — SIGNIFICANT CHANGE UP (ref 27–34)
MCHC RBC-ENTMCNC: 31.2 GM/DL — LOW (ref 32–36)
MCV RBC AUTO: 93.9 FL — SIGNIFICANT CHANGE UP (ref 80–100)
MONOCYTES # BLD AUTO: 1 K/UL — HIGH (ref 0–0.9)
MONOCYTES NFR BLD AUTO: 13.3 % — SIGNIFICANT CHANGE UP (ref 2–14)
NEUTROPHILS # BLD AUTO: 5.3 K/UL — SIGNIFICANT CHANGE UP (ref 1.8–7.4)
NEUTROPHILS NFR BLD AUTO: 68.4 % — SIGNIFICANT CHANGE UP (ref 43–77)
PHOSPHATE SERPL-MCNC: 3.9 MG/DL — SIGNIFICANT CHANGE UP (ref 2.5–4.5)
PLATELET # BLD AUTO: 110 K/UL — LOW (ref 150–400)
POTASSIUM SERPL-MCNC: 3.9 MMOL/L — SIGNIFICANT CHANGE UP (ref 3.5–5.3)
POTASSIUM SERPL-SCNC: 3.9 MMOL/L — SIGNIFICANT CHANGE UP (ref 3.5–5.3)
RBC # BLD: 4.19 M/UL — LOW (ref 4.2–5.8)
RBC # FLD: 15.7 % — HIGH (ref 11–15)
SODIUM SERPL-SCNC: 142 MMOL/L — SIGNIFICANT CHANGE UP (ref 135–145)
WBC # BLD: 7.7 K/UL — SIGNIFICANT CHANGE UP (ref 3.8–10.5)
WBC # FLD AUTO: 7.7 K/UL — SIGNIFICANT CHANGE UP (ref 3.8–10.5)

## 2018-01-19 PROCEDURE — 93306 TTE W/DOPPLER COMPLETE: CPT | Mod: 26

## 2018-01-19 PROCEDURE — 71046 X-RAY EXAM CHEST 2 VIEWS: CPT | Mod: 26

## 2018-01-19 PROCEDURE — 99233 SBSQ HOSP IP/OBS HIGH 50: CPT

## 2018-01-19 RX ORDER — ACETAMINOPHEN 500 MG
650 TABLET ORAL EVERY 6 HOURS
Qty: 0 | Refills: 0 | Status: DISCONTINUED | OUTPATIENT
Start: 2018-01-19 | End: 2018-01-24

## 2018-01-19 RX ORDER — LACTULOSE 10 G/15ML
20 SOLUTION ORAL
Qty: 0 | Refills: 0 | Status: DISCONTINUED | OUTPATIENT
Start: 2018-01-19 | End: 2018-01-24

## 2018-01-19 RX ORDER — INSULIN GLARGINE 100 [IU]/ML
30 INJECTION, SOLUTION SUBCUTANEOUS ONCE
Qty: 0 | Refills: 0 | Status: COMPLETED | OUTPATIENT
Start: 2018-01-19 | End: 2018-01-19

## 2018-01-19 RX ADMIN — Medication 75 MILLIGRAM(S): at 05:51

## 2018-01-19 RX ADMIN — TAMSULOSIN HYDROCHLORIDE 0.4 MILLIGRAM(S): 0.4 CAPSULE ORAL at 21:54

## 2018-01-19 RX ADMIN — Medication 3 MILLILITER(S): at 17:49

## 2018-01-19 RX ADMIN — Medication 40 MILLIGRAM(S): at 05:50

## 2018-01-19 RX ADMIN — Medication 30 MILLIGRAM(S): at 21:55

## 2018-01-19 RX ADMIN — Medication 100 MILLIGRAM(S): at 05:51

## 2018-01-19 RX ADMIN — RIVAROXABAN 20 MILLIGRAM(S): KIT at 17:05

## 2018-01-19 RX ADMIN — Medication 3 MILLILITER(S): at 11:00

## 2018-01-19 RX ADMIN — Medication 400 UNIT(S): at 13:45

## 2018-01-19 RX ADMIN — INSULIN GLARGINE 30 UNIT(S): 100 INJECTION, SOLUTION SUBCUTANEOUS at 23:56

## 2018-01-19 RX ADMIN — Medication 2: at 17:05

## 2018-01-19 RX ADMIN — FAMOTIDINE 20 MILLIGRAM(S): 10 INJECTION INTRAVENOUS at 05:51

## 2018-01-19 RX ADMIN — Medication 100 MILLIGRAM(S): at 21:55

## 2018-01-19 RX ADMIN — SIMVASTATIN 40 MILLIGRAM(S): 20 TABLET, FILM COATED ORAL at 21:54

## 2018-01-19 RX ADMIN — Medication 2: at 13:46

## 2018-01-19 RX ADMIN — Medication 100 MILLIGRAM(S): at 13:46

## 2018-01-19 RX ADMIN — FAMOTIDINE 20 MILLIGRAM(S): 10 INJECTION INTRAVENOUS at 17:05

## 2018-01-19 RX ADMIN — Medication 650 MILLIGRAM(S): at 20:17

## 2018-01-19 RX ADMIN — Medication 1 MILLIGRAM(S): at 13:46

## 2018-01-19 RX ADMIN — SENNA PLUS 2 TABLET(S): 8.6 TABLET ORAL at 21:55

## 2018-01-19 RX ADMIN — Medication 3 MILLILITER(S): at 23:14

## 2018-01-19 RX ADMIN — Medication 40 MILLIGRAM(S): at 17:05

## 2018-01-19 RX ADMIN — Medication 3 MILLILITER(S): at 05:42

## 2018-01-19 RX ADMIN — Medication 81 MILLIGRAM(S): at 13:46

## 2018-01-19 RX ADMIN — SACUBITRIL AND VALSARTAN 1 TABLET(S): 24; 26 TABLET, FILM COATED ORAL at 05:51

## 2018-01-19 RX ADMIN — SACUBITRIL AND VALSARTAN 1 TABLET(S): 24; 26 TABLET, FILM COATED ORAL at 17:05

## 2018-01-19 RX ADMIN — LACTULOSE 20 GRAM(S): 10 SOLUTION ORAL at 13:46

## 2018-01-19 NOTE — DIETITIAN INITIAL EVALUATION ADULT. - PERTINENT MEDS FT
ABX , furosemide , metoprolol , sacubitiril/ valsartan, tamsulosin, rivaroxaban , allopurinol , insulin glargine 53 units, lispro corrective regimen sliding scale 3 x day before meals & bedtime , simvastatin , cholecalciferol , docusate sodium , famotidine , senna , folic acid

## 2018-01-19 NOTE — DIETITIAN INITIAL EVALUATION ADULT. - NS AS NUTRI INTERV ED CONTENT
Purpose of the nutrition education/Educate on heart failure nutrition therapy, meal planning c plate method for portion control Educate on heart failure nutrition therapy, meal planning c plate method for portion control, increase fluid allowance [id number] ml fluid restriction/Purpose of the nutrition education

## 2018-01-19 NOTE — DIETITIAN INITIAL EVALUATION ADULT. - PROBLEM SELECTOR PLAN 1
Telemetry monitoring.  Lasix 40mg IVP Q12hrs.  Continue Entresto  Strict input/output monitoring,  Daily weight monitoring.  Fluid restriction to 1 liter/24 hrs.  Continue BiPAP  3 Sets of cardiac enzymes and ekg q8hrs.  Aspirin 325mg po qdaily  TTE  Cardiology consult  Elevate Head end of bed >35*, aspiration prec

## 2018-01-19 NOTE — PROGRESS NOTE ADULT - SUBJECTIVE AND OBJECTIVE BOX
Patient is a 72y old Male who presents with sys CHF exacerbation. Pt had a 101.6 fever yesterday evening.    INTERVAL HPI/OVERNIGHT EVENTS:    MEDICATIONS  (STANDING):  ALBUTerol/ipratropium for Nebulization 3 milliLiter(s) Nebulizer every 6 hours  allopurinol 100 milliGRAM(s) Oral daily  aspirin enteric coated 81 milliGRAM(s) Oral daily  azithromycin  IVPB 500 milliGRAM(s) IV Intermittent every 24 hours  cefTRIAXone   IVPB 1 Gram(s) IV Intermittent every 24 hours  cefTRIAXone   IVPB      cholecalciferol 400 Unit(s) Oral daily  dextrose 5%. 1000 milliLiter(s) (50 mL/Hr) IV Continuous <Continuous>  dextrose 50% Injectable 12.5 Gram(s) IV Push once  dextrose 50% Injectable 25 Gram(s) IV Push once  dextrose 50% Injectable 25 Gram(s) IV Push once  docusate sodium 100 milliGRAM(s) Oral three times a day  famotidine    Tablet 20 milliGRAM(s) Oral two times a day  folic acid 1 milliGRAM(s) Oral daily  furosemide   Injectable 40 milliGRAM(s) IV Push every 12 hours  insulin glargine Injectable (LANTUS) 53 Unit(s) SubCutaneous at bedtime  insulin lispro (HumaLOG) corrective regimen sliding scale   SubCutaneous three times a day before meals  insulin lispro (HumaLOG) corrective regimen sliding scale   SubCutaneous at bedtime  metoprolol succinate ER 75 milliGRAM(s) Oral daily  rivaroxaban 20 milliGRAM(s) Oral every 24 hours  sacubitril 24 mG/valsartan 26 mG 1 Tablet(s) Oral two times a day  senna 2 Tablet(s) Oral at bedtime  simvastatin 40 milliGRAM(s) Oral at bedtime  tamsulosin 0.4 milliGRAM(s) Oral at bedtime    MEDICATIONS  (PRN):  dextrose Gel 1 Dose(s) Oral once PRN Blood Glucose LESS THAN 70 milliGRAM(s)/deciliter  glucagon  Injectable 1 milliGRAM(s) IntraMuscular once PRN Glucose LESS THAN 70 milligrams/deciliter      Allergies    No Known Allergies    Intolerances        Vital Signs Last 24 Hrs  T(C): 36.9 (2018 05:15), Max: 38.7 (2018 17:25)  T(F): 98.4 (2018 05:15), Max: 101.6 (2018 17:25)  HR: 60 (2018 05:44) (60 - 68)  BP: 122/60 (2018 05:15) (122/56 - 148/62)  BP(mean): --  RR: 18 (2018 05:15) (16 - 18)  SpO2: 93% (2018 05:44) (93% - 98%)    PHYSICAL EXAM:  GENERAL: NAD, well-groomed, well-developed  HEAD:  Atraumatic, Normocephalic  EYES: EOMI, PERRLA, conjunctiva and sclera clear  ENMT: No tonsillar erythema, exudates, or enlargement; Moist mucous membranes, Good dentition, No lesions  NECK: Supple, No JVD, Normal thyroid  NERVOUS SYSTEM:  Alert & Oriented X3, Good concentration; Motor Strength 5/5 B/L upper and lower extremities; DTRs 2+ intact and symmetric  CHEST/LUNG: Clear to percussion bilaterally; No rales, rhonchi, wheezing, or rubs  HEART: Regular rate and rhythm; No murmurs, rubs, or gallops  ABDOMEN: Soft, Nontender, Nondistended; Bowel sounds present  EXTREMITIES:  2+ Peripheral Pulses, No clubbing, cyanosis, or edema  LYMPH: No lymphadenopathy noted  SKIN: No rashes or lesions    LABS:                        12.3   7.7   )-----------( 110      ( 2018 08:27 )             39.3         142  |  101  |  49<H>  ----------------------------<  84  3.9   |  33<H>  |  2.20<H>    Ca    8.2<L>      2018 07:51  Phos  3.9       Mg     2.3         TPro  7.5  /  Alb  3.4  /  TBili  0.9  /  DBili  x   /  AST  39<H>  /  ALT  28  /  AlkPhos  66        Urinalysis Basic - ( 2018 21:16 )    Color: Yellow / Appearance: Clear / S.010 / pH: x  Gluc: x / Ketone: Negative  / Bili: Negative / Urobili: Negative mg/dL   Blood: x / Protein: Negative mg/dL / Nitrite: Negative   Leuk Esterase: Negative / RBC: 0-2 /HPF / WBC x   Sq Epi: x / Non Sq Epi: x / Bacteria: x      CAPILLARY BLOOD GLUCOSE      POCT Blood Glucose.: 96 mg/dL (2018 07:30)  POCT Blood Glucose.: 166 mg/dL (2018 21:07)  POCT Blood Glucose.: 150 mg/dL (2018 17:14)  POCT Blood Glucose.: 128 mg/dL (2018 12:52) Patient is a 72y old Male who presents with sys CHF exacerbation. Pt had a 101.6 fever yesterday evening. Pt denies any body aches, new soars or cuts or dysuria. He continues to report cough productive of mucus which he    INTERVAL HPI/OVERNIGHT EVENTS:    MEDICATIONS  (STANDING):  ALBUTerol/ipratropium for Nebulization 3 milliLiter(s) Nebulizer every 6 hours  allopurinol 100 milliGRAM(s) Oral daily  aspirin enteric coated 81 milliGRAM(s) Oral daily  azithromycin  IVPB 500 milliGRAM(s) IV Intermittent every 24 hours  cefTRIAXone   IVPB 1 Gram(s) IV Intermittent every 24 hours  cefTRIAXone   IVPB      cholecalciferol 400 Unit(s) Oral daily  dextrose 5%. 1000 milliLiter(s) (50 mL/Hr) IV Continuous <Continuous>  dextrose 50% Injectable 12.5 Gram(s) IV Push once  dextrose 50% Injectable 25 Gram(s) IV Push once  dextrose 50% Injectable 25 Gram(s) IV Push once  docusate sodium 100 milliGRAM(s) Oral three times a day  famotidine    Tablet 20 milliGRAM(s) Oral two times a day  folic acid 1 milliGRAM(s) Oral daily  furosemide   Injectable 40 milliGRAM(s) IV Push every 12 hours  insulin glargine Injectable (LANTUS) 53 Unit(s) SubCutaneous at bedtime  insulin lispro (HumaLOG) corrective regimen sliding scale   SubCutaneous three times a day before meals  insulin lispro (HumaLOG) corrective regimen sliding scale   SubCutaneous at bedtime  metoprolol succinate ER 75 milliGRAM(s) Oral daily  rivaroxaban 20 milliGRAM(s) Oral every 24 hours  sacubitril 24 mG/valsartan 26 mG 1 Tablet(s) Oral two times a day  senna 2 Tablet(s) Oral at bedtime  simvastatin 40 milliGRAM(s) Oral at bedtime  tamsulosin 0.4 milliGRAM(s) Oral at bedtime    MEDICATIONS  (PRN):  dextrose Gel 1 Dose(s) Oral once PRN Blood Glucose LESS THAN 70 milliGRAM(s)/deciliter  glucagon  Injectable 1 milliGRAM(s) IntraMuscular once PRN Glucose LESS THAN 70 milligrams/deciliter    Allergies    No Known Allergies    Vital Signs Last 24 Hrs  T(C): 36.9 (2018 05:15), Max: 38.7 (2018 17:25)  T(F): 98.4 (2018 05:15), Max: 101.6 (2018 17:25)  HR: 60 (2018 05:44) (60 - 68)  BP: 122/60 (2018 05:15) (122/56 - 148/62)  BP(mean): --  RR: 18 (2018 05:15) (16 - 18)  SpO2: 93% (2018 05:44) (93% - 98%)    PHYSICAL EXAM:  GENERAL: NAD, well-groomed, well-developed  HEAD:  Atraumatic, Normocephalic  EYES: EOMI, PERRLA  NECK: Supple  NERVOUS SYSTEM:  Alert w/ good concentration  CHEST/LUNG: B/l rhonchi but better air movement than yesterday  HEART: Regular rate and rhythm; No murmurs, rubs, or gallops  ABDOMEN: Soft, Nontender, mildly distended; Bowel sounds present  EXTREMITIES:  edema improving    LABS:                        12.3   7.7   )-----------( 110      ( 2018 08:27 )             39.3     -    142  |  101  |  49<H>  ----------------------------<  84  3.9   |  33<H>  |  2.20<H>    Ca    8.2<L>      2018 07:51  Phos  3.9       Mg     2.3         TPro  7.5  /  Alb  3.4  /  TBili  0.9  /  DBili  x   /  AST  39<H>  /  ALT  28  /  AlkPhos  66        Urinalysis Basic - ( 2018 21:16 )    Color: Yellow / Appearance: Clear / S.010 / pH: x  Gluc: x / Ketone: Negative  / Bili: Negative / Urobili: Negative mg/dL   Blood: x / Protein: Negative mg/dL / Nitrite: Negative   Leuk Esterase: Negative / RBC: 0-2 /HPF / WBC x   Sq Epi: x / Non Sq Epi: x / Bacteria: x      CAPILLARY BLOOD GLUCOSE      POCT Blood Glucose.: 96 mg/dL (2018 07:30)  POCT Blood Glucose.: 166 mg/dL (2018 21:07)  POCT Blood Glucose.: 150 mg/dL (2018 17:14)  POCT Blood Glucose.: 128 mg/dL (2018 12:52)

## 2018-01-19 NOTE — PROGRESS NOTE ADULT - SUBJECTIVE AND OBJECTIVE BOX
Assessment:  Acute on chronic systolic CHF, AICD in place  H/O CAD, last echo 2015 EF 30%  Optimize meds, clinical improvement noted already  Repeat TTE  Doing well with aggressive diuresis  Tamiflu

## 2018-01-19 NOTE — PROGRESS NOTE ADULT - ASSESSMENT
72 year old man with PMH of chronic Sys CHF, COPD, CKD, EMBER on CPAP, DM2, HTN, HLD presents w/ systolic CHF exacerbation.     Sys CHF exacerbation  - c/w telemetry monitoring  - CXR showed stable cardiomegaly w/ mild pulmonary vascular congestion  - c/w Lasix 40mg IVP Q12hrs as Cr is stable  - c/w Entresto, statin & metoprolol  - c/w Strict input/output & daily weight monitoring, patient has lost ~ 2lb since yesterday (3 since admission)  - c/w continue BiPAP  - cardiac consult note read and appreciated  - TTE pending    CAD status post CABG, PCI in 2011 and AICD  - cardiac enzymes borderline w/ trop peaking at 0.425  - cardio following  - c/w ASA    Fever  - UA is negative  - CXR showed no consolidation  - WBC is WNL  - will stop Ceftriaxone and Azithro and watch, if fevers persist and there are no clear sources will restart antibiotics, get a 2 view CXR and consult ID  - cultures pending  - Influenza A & B rapid screen is negtaive  - Resp viral panel is pending       Hypertension   - c/w Entresto & metoprolol    Questionable COPD history   - c/w BiPAP & add PRN nebs      DM type 2   - c/w Lantus 53 units SC QHS & Sliding scale with coverage  - Hgb A1C was 9.8      HLD   - c/w simvastatin    Constipation  - add colace and senna    Chronic atrial fibrillation  - c/w Xarelto & metoprolol    DVT Prophylaxis: Xarelto   GI prophylaxis: Pepcid 72 year old man with PMH of chronic Sys CHF, COPD, CKD, EMBER on CPAP, DM2, HTN, HLD presents w/ systolic CHF exacerbation.     Sys CHF exacerbation  - c/w telemetry monitoring  - CXR showed stable cardiomegaly w/ mild pulmonary vascular congestion  - c/w Lasix 40mg IVP Q12hrs as Cr is stable, will probably lower Lasix dose tomorrow  - c/w Entresto, statin & metoprolol  - c/w Strict input/output & daily weight monitoring, patient has lost ~ 2lb since yesterday (3 since admission)  - c/w continue BiPAP  - cardiac consult note read and appreciated  - TTE pending    CAD status post CABG, PCI in 2011 and AICD  - cardiac enzymes borderline w/ trop peaking at 0.425  - cardio following  - c/w ASA    Fever  - UA is negative  - CXR showed no consolidation, will get a 2 view CXR   - WBC is WNL  - will stop Ceftriaxone and Azithro and watch, if fevers persist and there are no clear sources will restart antibiotics and consult ID  - cultures pending  - Influenza A & B rapid screen is negative  - Resp viral panel is pending       Hypertension   - c/w Entresto & metoprolol    Questionable COPD history   - c/w BiPAP & add PRN nebs      DM type 2   - c/w Lantus 53 units SC QHS & Sliding scale with coverage  - Hgb A1C was 9.8      HLD   - c/w simvastatin    Constipation  - add colace and senna  - add lactulose    Chronic atrial fibrillation  - c/w Xarelto & metoprolol    DVT Prophylaxis: Xarelto   GI prophylaxis: Pepcid 72 year old man with PMH of chronic Sys CHF, COPD, Stage 3 CKD, EMBER on CPAP, DM2, HTN, HLD presents w/ systolic CHF exacerbation.     Sys CHF exacerbation  - c/w telemetry monitoring  - CXR showed stable cardiomegaly w/ mild pulmonary vascular congestion  - c/w Lasix 40mg IVP Q12hrs as Cr is stable, will probably lower Lasix dose tomorrow  - c/w Entresto, statin & metoprolol  - c/w Strict input/output & daily weight monitoring, patient has lost ~ 2lb since yesterday (3 since admission)  - c/w continue BiPAP  - cardiac consult note read and appreciated  - TTE pending    CAD status post CABG, PCI in 2011 and AICD  - cardiac enzymes borderline w/ trop peaking at 0.425  - cardio following  - c/w ASA    Fever  - UA is negative  - CXR showed no consolidation, will get a 2 view CXR   - WBC is WNL  - will stop Ceftriaxone and Azithro and watch, if fevers persist and there are no clear sources will restart antibiotics and consult ID  - cultures pending  - Influenza A & B rapid screen is negative  - Resp viral panel is pending       Hypertension   - c/w Entresto & metoprolol    Questionable COPD history   - c/w BiPAP & add PRN nebs      DM type 2   - c/w Lantus 53 units SC QHS & Sliding scale with coverage  - Hgb A1C was 9.8      HLD   - c/w simvastatin    Constipation  - add colace and senna  - add lactulose    Chronic atrial fibrillation  - c/w Xarelto & metoprolol    DVT Prophylaxis: Xarelto   GI prophylaxis: Pepcid

## 2018-01-19 NOTE — DIETITIAN INITIAL EVALUATION ADULT. - SIGNS/SYMPTOMS
lack of compliance to Low sodium diet, calorie controlled diet , wt. gain, BMI=36.3 lack of compliance to Low sodium diet, calorie controlled diet , wt. gain, BMI=36.3, HbA1/GC 9.8 %

## 2018-01-19 NOTE — DIETITIAN INITIAL EVALUATION ADULT. - DIET TYPE
1000ml/consistent carbohydrate renal with no snacks/calorie controlled 1500 (01-17)/DASH/TLC (sodium and cholesterol restricted diet)

## 2018-01-19 NOTE — DIETITIAN INITIAL EVALUATION ADULT. - OTHER INFO
Pt seen due to MD consult for assessment & education.  As per diet hx provided, pt ate canned salmon( high in sodium), eggs and bread for breakfast, hot meal for lunch for dinner which may consist of fresh salmon or fried fish, starch & vegetable.  Pt reports that he ate a bag of chips( high in sodium) a week PTA.  Pt was on insulin Toujeo PTA and pill which he was not able to name & self monitored blood glucose 2 x day PTA.  Pt reports high blood glucose PTA. Pt seen due to MD consult for assessment & education.  As per diet hx provided, pt ate canned salmon( high in sodium), eggs and bread for breakfast, hot meal for lunch for dinner which may consist of fresh salmon or fried fish, starch & vegetable.  Pt reports that he ate a bag of chips( high in sodium) a week PTA.  Pt was on insulin Toujeo PTA and pill which he was not able to name( as per chart review, pt was on nateglinide) & self monitored blood glucose 2 x day PTA.  Pt reports high blood glucose PTA.

## 2018-01-19 NOTE — DIETITIAN INITIAL EVALUATION ADULT. - NUTRITIONGOAL OUTCOME1
pt verbalizes 3 foods high in sodium & portion control according to my plate pt verbalizes 3 foods high in sodium & portion control as per my plate, glucose 100-200 mg/ dl

## 2018-01-19 NOTE — DIETITIAN INITIAL EVALUATION ADULT. - PERTINENT LABORATORY DATA
01-19 Na142 mmol/L Glu 84 mg/dL K+ 3.9 mmol/L Cr  2.20 mg/dL<H> BUN 49 mg/dL<H> Est GFR (AA)33 mL/min/1.73M2<L>Phos 3.9 mg/dL Ca 8.2 mg/dL<L>01-18 POCT blood Glucose range 150-206 mg/dl<H> 01-18 Alb 3.4 g/dL 01-17 HbA1/GC 9.8 %<H> CHOL 124 mg/dL 01-16 serum pro-brain natriuretic peptide 2177 pg/dL<H> Alb 3.8 g/dL

## 2018-01-19 NOTE — DIETITIAN INITIAL EVALUATION ADULT. - ENERGY NEEDS
Height (cm): 185.42 (01-16)  Weight (kg): 116.4 (01-17)  BMI (kg/m2): 33.9 (01-17)  IBW: 83.4 kg         % IBW: 139%          UBW:              %UBW Height (cm): 179.07 cm(01-19)  Weight (kg): 116.4 (01-17)  BMI (kg/m2): 36.3(01-19)  IBW: 76.6 kg        % IBW: 151%       UBW: 104.3 kg         %UBW: 112% Height (cm): 179.07 cm(01-19)  Weight (kg): 116.4 (01-17)  BMI (kg/m2): 36.3(01-19)  IBW: 76.6 kg        % IBW: 151%       UBW: 104.3 kg         %UBW: 112%  I/O: 540/480(+60)

## 2018-01-19 NOTE — DIETITIAN INITIAL EVALUATION ADULT. - NS AS NUTRI INTERV MEALS SNACK
Fat - modified diet/Carbohydrate - modified diet/Recommend consistent carbohydrate c evening snack , DASH/ TLC diet/Mineral - modified diet Fat - modified diet/Carbohydrate - modified diet/Mineral - modified diet/Fluid - modified diet/Recommend consistent carbohydrate c evening snack , DASH/ TLC diet

## 2018-01-20 LAB
ALBUMIN SERPL ELPH-MCNC: 3.3 G/DL — SIGNIFICANT CHANGE UP (ref 3.3–5)
ALP SERPL-CCNC: 63 U/L — SIGNIFICANT CHANGE UP (ref 40–120)
ALT FLD-CCNC: 26 U/L — SIGNIFICANT CHANGE UP (ref 12–78)
ANION GAP SERPL CALC-SCNC: 7 MMOL/L — SIGNIFICANT CHANGE UP (ref 5–17)
AST SERPL-CCNC: 37 U/L — SIGNIFICANT CHANGE UP (ref 15–37)
BASOPHILS # BLD AUTO: 0.1 K/UL — SIGNIFICANT CHANGE UP (ref 0–0.2)
BASOPHILS NFR BLD AUTO: 1.1 % — SIGNIFICANT CHANGE UP (ref 0–2)
BILIRUB SERPL-MCNC: 0.6 MG/DL — SIGNIFICANT CHANGE UP (ref 0.2–1.2)
BUN SERPL-MCNC: 57 MG/DL — HIGH (ref 7–23)
CALCIUM SERPL-MCNC: 8 MG/DL — LOW (ref 8.5–10.1)
CHLORIDE SERPL-SCNC: 99 MMOL/L — SIGNIFICANT CHANGE UP (ref 96–108)
CO2 SERPL-SCNC: 32 MMOL/L — HIGH (ref 22–31)
CREAT SERPL-MCNC: 2.35 MG/DL — HIGH (ref 0.5–1.3)
CRP SERPL-MCNC: 6.5 MG/DL — HIGH (ref 0–0.4)
CULTURE RESULTS: NO GROWTH — SIGNIFICANT CHANGE UP
EOSINOPHIL # BLD AUTO: 0.1 K/UL — SIGNIFICANT CHANGE UP (ref 0–0.5)
EOSINOPHIL NFR BLD AUTO: 1 % — SIGNIFICANT CHANGE UP (ref 0–6)
GLUCOSE SERPL-MCNC: 254 MG/DL — HIGH (ref 70–99)
HCT VFR BLD CALC: 38 % — LOW (ref 39–50)
HGB BLD-MCNC: 12.5 G/DL — LOW (ref 13–17)
LYMPHOCYTES # BLD AUTO: 1 K/UL — SIGNIFICANT CHANGE UP (ref 1–3.3)
LYMPHOCYTES # BLD AUTO: 13.4 % — SIGNIFICANT CHANGE UP (ref 13–44)
MAGNESIUM SERPL-MCNC: 2.2 MG/DL — SIGNIFICANT CHANGE UP (ref 1.6–2.6)
MCHC RBC-ENTMCNC: 30.9 PG — SIGNIFICANT CHANGE UP (ref 27–34)
MCHC RBC-ENTMCNC: 32.9 GM/DL — SIGNIFICANT CHANGE UP (ref 32–36)
MCV RBC AUTO: 94 FL — SIGNIFICANT CHANGE UP (ref 80–100)
MONOCYTES # BLD AUTO: 1 K/UL — HIGH (ref 0–0.9)
MONOCYTES NFR BLD AUTO: 13.2 % — SIGNIFICANT CHANGE UP (ref 2–14)
NEUTROPHILS # BLD AUTO: 5.5 K/UL — SIGNIFICANT CHANGE UP (ref 1.8–7.4)
NEUTROPHILS NFR BLD AUTO: 71.3 % — SIGNIFICANT CHANGE UP (ref 43–77)
PHOSPHATE SERPL-MCNC: 3.8 MG/DL — SIGNIFICANT CHANGE UP (ref 2.5–4.5)
PLATELET # BLD AUTO: 107 K/UL — LOW (ref 150–400)
POTASSIUM SERPL-MCNC: 4 MMOL/L — SIGNIFICANT CHANGE UP (ref 3.5–5.3)
POTASSIUM SERPL-SCNC: 4 MMOL/L — SIGNIFICANT CHANGE UP (ref 3.5–5.3)
PROT SERPL-MCNC: 7.5 GM/DL — SIGNIFICANT CHANGE UP (ref 6–8.3)
RBC # BLD: 4.05 M/UL — LOW (ref 4.2–5.8)
RBC # FLD: 15.3 % — HIGH (ref 11–15)
SODIUM SERPL-SCNC: 138 MMOL/L — SIGNIFICANT CHANGE UP (ref 135–145)
SPECIMEN SOURCE: SIGNIFICANT CHANGE UP
WBC # BLD: 7.7 K/UL — SIGNIFICANT CHANGE UP (ref 3.8–10.5)
WBC # FLD AUTO: 7.7 K/UL — SIGNIFICANT CHANGE UP (ref 3.8–10.5)

## 2018-01-20 PROCEDURE — 99232 SBSQ HOSP IP/OBS MODERATE 35: CPT

## 2018-01-20 RX ORDER — HYDROCORTISONE 20 MG
20 TABLET ORAL EVERY 12 HOURS
Qty: 0 | Refills: 0 | Status: DISCONTINUED | OUTPATIENT
Start: 2018-01-20 | End: 2018-01-22

## 2018-01-20 RX ADMIN — Medication 6: at 07:44

## 2018-01-20 RX ADMIN — Medication 40 MILLIGRAM(S): at 17:01

## 2018-01-20 RX ADMIN — Medication 20 MILLIGRAM(S): at 17:02

## 2018-01-20 RX ADMIN — SIMVASTATIN 40 MILLIGRAM(S): 20 TABLET, FILM COATED ORAL at 21:30

## 2018-01-20 RX ADMIN — Medication 30 MILLIGRAM(S): at 11:47

## 2018-01-20 RX ADMIN — FAMOTIDINE 20 MILLIGRAM(S): 10 INJECTION INTRAVENOUS at 06:04

## 2018-01-20 RX ADMIN — Medication 400 UNIT(S): at 11:47

## 2018-01-20 RX ADMIN — Medication 75 MILLIGRAM(S): at 06:04

## 2018-01-20 RX ADMIN — Medication 3 MILLILITER(S): at 05:27

## 2018-01-20 RX ADMIN — SACUBITRIL AND VALSARTAN 1 TABLET(S): 24; 26 TABLET, FILM COATED ORAL at 06:04

## 2018-01-20 RX ADMIN — Medication 100 MILLIGRAM(S): at 11:48

## 2018-01-20 RX ADMIN — Medication 100 MILLIGRAM(S): at 06:04

## 2018-01-20 RX ADMIN — Medication 1: at 21:33

## 2018-01-20 RX ADMIN — TAMSULOSIN HYDROCHLORIDE 0.4 MILLIGRAM(S): 0.4 CAPSULE ORAL at 21:30

## 2018-01-20 RX ADMIN — FAMOTIDINE 20 MILLIGRAM(S): 10 INJECTION INTRAVENOUS at 17:02

## 2018-01-20 RX ADMIN — Medication 81 MILLIGRAM(S): at 11:48

## 2018-01-20 RX ADMIN — Medication 2: at 17:01

## 2018-01-20 RX ADMIN — Medication 100 MILLIGRAM(S): at 13:13

## 2018-01-20 RX ADMIN — RIVAROXABAN 20 MILLIGRAM(S): KIT at 17:01

## 2018-01-20 RX ADMIN — INSULIN GLARGINE 53 UNIT(S): 100 INJECTION, SOLUTION SUBCUTANEOUS at 21:31

## 2018-01-20 RX ADMIN — Medication 3 MILLILITER(S): at 23:05

## 2018-01-20 RX ADMIN — Medication 3 MILLILITER(S): at 17:17

## 2018-01-20 RX ADMIN — Medication 3 MILLILITER(S): at 11:03

## 2018-01-20 RX ADMIN — Medication 2: at 11:48

## 2018-01-20 RX ADMIN — SACUBITRIL AND VALSARTAN 1 TABLET(S): 24; 26 TABLET, FILM COATED ORAL at 17:01

## 2018-01-20 RX ADMIN — Medication 1 MILLIGRAM(S): at 11:48

## 2018-01-20 RX ADMIN — Medication 40 MILLIGRAM(S): at 06:04

## 2018-01-20 NOTE — PROGRESS NOTE ADULT - SUBJECTIVE AND OBJECTIVE BOX
Assessment:  Acute on chronic systolic CHF, AICD in place  H/O CAD, last echo 2015 EF 30%  Optimize meds, clinical improvement noted already  Repeat TTE  Doing well with aggressive diuresis, creatine is elevated but at his baseline, continue lasix as needed for now  Tamiflu

## 2018-01-20 NOTE — PROGRESS NOTE ADULT - ASSESSMENT
72 year old man with a PMHx notable for chronic Sys CHF, COPD, Stage 3 CKD, EMBER on CPAP, DM2, HTN, and HLD who presents with acute systolic CHF exacerbation.     PLAN  # Systolic CHF exacerbation  - c/w telemetry monitoring  - CXR 1/19 showed stable cardiomegaly with mild pulmonary vascular congestion  - IV lasix 40mg IVP Q12 hrs as per cardiology  - noted worsening Cr, consider lower lasix dose today   - c/w Entresto, statin & metoprolol  - c/w Strict input/output & daily weight monitoring, patient has lost ~ 3lb since admission  - c/w continue BiPAP  - cardiac consult note read and appreciated  - 2D ECHO 1/19 EF 35 - 40%    #CAD status post CABG, PCI in 2011 and AICD  - cardiac enzymes borderline with troponin peaking at 0.425  - cardioogy following  - c/w ASA    # Fever/Bronchitis  - UA is negative  - CXR 1/19 showed no consolidation  - WBC is WNL  - + Influenza A  - 1/19 started on Tamiflu   - consider low dose of IV steroids for wheezing    # Hypertension   - c/w Entresto & metoprolol    Questionable COPD history   - c/w BiPAP & add PRN nebs      # DM type 2   - c/w Lantus 53 units SC QHS & Sliding scale with coverage  - Hgb A1C was 9.8      # HLD   - continue with simvastatin    # Constipation  - added colace and senna  - Also on lactulose    # Chronic atrial fibrillation  - continue with Xarelto & metoprolol    # DVT Prophylaxis: Xarelto   GI prophylaxis: Pepcid 72 year old man with a PMHx notable for chronic Sys CHF, COPD, Stage 3 CKD, EMBER on CPAP, DM2, HTN, and HLD who presents with acute systolic CHF exacerbation.     PLAN  # Systolic CHF exacerbation  - c/w telemetry monitoring  - CXR 1/19 showed stable cardiomegaly with mild pulmonary vascular congestion  - IV lasix 40mg IVP Q12 hrs as per cardiology  - noted worsening Cr, consider lower lasix dose today   - c/w Entresto, statin & metoprolol  - c/w Strict input/output & daily weight monitoring, patient has lost ~ 3lb since admission  - c/w continue BiPAP  - cardiac consult note read and appreciated  - 2D ECHO 1/19 EF 35 - 40%    #CAD status post CABG, PCI in 2011 and AICD  - cardiac enzymes borderline with troponin peaking at 0.425  - cardioogy following  - c/w ASA    # Fever/Bronchitis  - UA is negative  - CXR 1/19 showed no consolidation  - WBC is WNL  - + Influenza A  - 1/19 started on Tamiflu   - consider low dose of IV steroids for wheezing    # Hypertension   - c/w Entresto & metoprolol    Questionable COPD history   - c/w BiPAP & add PRN nebs      # DM type 2   - Still uncontrolled despite high dose of lantus  - now to start steroids   - consider endocrine eval  - For now c/w Lantus 53 units SC QHS & Sliding scale with coverage  - Hgb A1C was 9.8      # HLD   - continue with simvastatin    # Constipation  - added colace and senna  - Also on lactulose    # Chronic atrial fibrillation  - continue with Xarelto & metoprolol    # DVT Prophylaxis: Xarelto   GI prophylaxis: Pepcid

## 2018-01-20 NOTE — PROGRESS NOTE ADULT - SUBJECTIVE AND OBJECTIVE BOX
Patient is a 72y old  Male who presents with a chief complaint of Patient was complaining of difficulty breathing. (2018 11:56)      HPI:  72 year old man with PMH of chronic CHF, COPD, CKD III, EMBER on CPAP, DM 2, HTN, and HLD.  He presents to ED with complaints of worsening SOB and leg swelling.  Patient has SOB at baseline which worsened tonight with swelling in both of his legs.  He denies chest pain, palpitation, lightheadedness, diaphoresis.  He is compliant with all medications and sees his doctors regularly.    In the ED, BNP 2177, troponin mildly elevated, EKG without ischemic changes; paced.  Labs consistent with CKD, leukocytosis without clear source of infection.  Had been HTN emergency in ED, was on nitro, drip stopped (2018 02:25)       SUBJECTIVE & OBJECTIVE: Pt seen and examined at bedside.  No acute events overnight.  Mild wheezing noted on exam.    BUN/Cr rising on IV lasix.      PHYSICAL EXAM:  T(C): 37.4 (18 @ 05:24), Max: 38.2 (18 @ 19:26)  HR: 61 (18 @ 11:20) (60 - 74)  BP: 129/59 (18 @ 11:20) (112/49 - 133/64)  RR: 17 (18 @ 11:20) (16 - 18)  SpO2: 96% (18 @ 11:20) (93% - 100%)    GENERAL: NAD, well-groomed, well-developed  HEAD:  Atraumatic, Normocephalic  EYES: EOMI, PERRLA, conjunctiva and sclera clear  ENMT: Moist mucous membranes  NECK: Supple, No JVD  NERVOUS SYSTEM:  Alert & Oriented X3, Motor Strength 5/5 B/L upper and lower extremities; DTRs 2+ intact and symmetric  CHEST/LUNG: Clear to auscultation bilaterally; No rales, rhonchi, wheezing, or rubs  HEART: Regular rate and rhythm; No murmurs, rubs, or gallops  ABDOMEN: Soft, Nontender, Nondistended; Bowel sounds present  EXTREMITIES:  2+ Peripheral Pulses, No clubbing, cyanosis, or edema        MEDICATIONS  (STANDING):  ALBUTerol/ipratropium for Nebulization 3 milliLiter(s) Nebulizer every 6 hours  allopurinol 100 milliGRAM(s) Oral daily  aspirin enteric coated 81 milliGRAM(s) Oral daily  cholecalciferol 400 Unit(s) Oral daily  dextrose 5%. 1000 milliLiter(s) (50 mL/Hr) IV Continuous <Continuous>  dextrose 50% Injectable 12.5 Gram(s) IV Push once  dextrose 50% Injectable 25 Gram(s) IV Push once  dextrose 50% Injectable 25 Gram(s) IV Push once  docusate sodium 100 milliGRAM(s) Oral three times a day  famotidine    Tablet 20 milliGRAM(s) Oral two times a day  folic acid 1 milliGRAM(s) Oral daily  furosemide   Injectable 40 milliGRAM(s) IV Push every 12 hours  insulin glargine Injectable (LANTUS) 53 Unit(s) SubCutaneous at bedtime  insulin lispro (HumaLOG) corrective regimen sliding scale   SubCutaneous three times a day before meals  insulin lispro (HumaLOG) corrective regimen sliding scale   SubCutaneous at bedtime  metoprolol succinate ER 75 milliGRAM(s) Oral daily  oseltamivir 30 milliGRAM(s) Oral daily  rivaroxaban 20 milliGRAM(s) Oral every 24 hours  sacubitril 24 mG/valsartan 26 mG 1 Tablet(s) Oral two times a day  senna 2 Tablet(s) Oral at bedtime  simvastatin 40 milliGRAM(s) Oral at bedtime  tamsulosin 0.4 milliGRAM(s) Oral at bedtime    MEDICATIONS  (PRN):  acetaminophen   Tablet 650 milliGRAM(s) Oral every 6 hours PRN For Temp greater than 38 C (100.4 F)  dextrose Gel 1 Dose(s) Oral once PRN Blood Glucose LESS THAN 70 milliGRAM(s)/deciliter  glucagon  Injectable 1 milliGRAM(s) IntraMuscular once PRN Glucose LESS THAN 70 milligrams/deciliter  lactulose Syrup 20 Gram(s) Oral two times a day PRN constipation      LABS:                        12.5   7.7   )-----------( 107      ( 2018 07:04 )             38.0     2018    138  |  99  |  57<H>  ----------------------------<  254<H>  4.0   |  32<H>  |  2.35<H>    Calcium    8.0<L>      2018 07:04  Phos  3.8       Mg     2.2         T Protein  7.5  /  Alb  3.3  /  TBili  0.6  /  DBili  x   /  AST  37  /  ALT  26  /  AlkPhos  63        Urinalysis Basic - ( 2018 21:16 )    Color: Yellow / Appearance: Clear / S.010 / pH: x  Gluc: x / Ketone: Negative  / Bili: Negative / Urobili: Negative mg/dL   Blood: x / Protein: Negative mg/dL / Nitrite: Negative   Leuk Esterase: Negative / RBC: 0-2 /HPF / WBC x   Sq Epi: x / Non Sq Epi: x / Bacteria: x      Magnesium, Serum: 2.2 mg/dL ( @ 07:04)    POCT Blood Glucose.: 166 mg/dL (2018 11:42)  POCT Blood Glucose.: 290 mg/dL (2018 07:25)  POCT Blood Glucose.: 137 mg/dL (2018 21:53)  POCT Blood Glucose.: 199 mg/dL (2018 16:39)  POCT Blood Glucose.: 185 mg/dL (2018 13:42)        POCT Blood Glucose.: 166 mg/dL (2018 11:42)  POCT Blood Glucose.: 290 mg/dL (2018 07:25)  POCT Blood Glucose.: 137 mg/dL (2018 21:53)  POCT Blood Glucose.: 199 mg/dL (2018 16:39)  POCT Blood Glucose.: 185 mg/dL (2018 13:42)      POCT Blood Glucose.: 166 mg/dL (2018 11:42)    DVT/GI prophylaxsis   Discussed with patient @ bedside

## 2018-01-21 LAB
ANION GAP SERPL CALC-SCNC: 7 MMOL/L — SIGNIFICANT CHANGE UP (ref 5–17)
BUN SERPL-MCNC: 61 MG/DL — HIGH (ref 7–23)
CALCIUM SERPL-MCNC: 8.1 MG/DL — LOW (ref 8.5–10.1)
CHLORIDE SERPL-SCNC: 101 MMOL/L — SIGNIFICANT CHANGE UP (ref 96–108)
CO2 SERPL-SCNC: 32 MMOL/L — HIGH (ref 22–31)
CREAT SERPL-MCNC: 2.09 MG/DL — HIGH (ref 0.5–1.3)
GLUCOSE SERPL-MCNC: 208 MG/DL — HIGH (ref 70–99)
HCT VFR BLD CALC: 38.6 % — LOW (ref 39–50)
HGB BLD-MCNC: 12.8 G/DL — LOW (ref 13–17)
MCHC RBC-ENTMCNC: 31 PG — SIGNIFICANT CHANGE UP (ref 27–34)
MCHC RBC-ENTMCNC: 33.1 GM/DL — SIGNIFICANT CHANGE UP (ref 32–36)
MCV RBC AUTO: 93.7 FL — SIGNIFICANT CHANGE UP (ref 80–100)
PLATELET # BLD AUTO: 116 K/UL — LOW (ref 150–400)
POTASSIUM SERPL-MCNC: 3.7 MMOL/L — SIGNIFICANT CHANGE UP (ref 3.5–5.3)
POTASSIUM SERPL-SCNC: 3.7 MMOL/L — SIGNIFICANT CHANGE UP (ref 3.5–5.3)
RBC # BLD: 4.12 M/UL — LOW (ref 4.2–5.8)
RBC # FLD: 15.2 % — HIGH (ref 11–15)
SODIUM SERPL-SCNC: 140 MMOL/L — SIGNIFICANT CHANGE UP (ref 135–145)
WBC # BLD: 5.3 K/UL — SIGNIFICANT CHANGE UP (ref 3.8–10.5)
WBC # FLD AUTO: 5.3 K/UL — SIGNIFICANT CHANGE UP (ref 3.8–10.5)

## 2018-01-21 RX ADMIN — SACUBITRIL AND VALSARTAN 1 TABLET(S): 24; 26 TABLET, FILM COATED ORAL at 17:15

## 2018-01-21 RX ADMIN — TAMSULOSIN HYDROCHLORIDE 0.4 MILLIGRAM(S): 0.4 CAPSULE ORAL at 21:30

## 2018-01-21 RX ADMIN — SACUBITRIL AND VALSARTAN 1 TABLET(S): 24; 26 TABLET, FILM COATED ORAL at 05:35

## 2018-01-21 RX ADMIN — Medication 4: at 16:20

## 2018-01-21 RX ADMIN — Medication 75 MILLIGRAM(S): at 05:35

## 2018-01-21 RX ADMIN — Medication 3 MILLILITER(S): at 11:19

## 2018-01-21 RX ADMIN — FAMOTIDINE 20 MILLIGRAM(S): 10 INJECTION INTRAVENOUS at 17:15

## 2018-01-21 RX ADMIN — Medication 4: at 07:47

## 2018-01-21 RX ADMIN — Medication 1: at 21:32

## 2018-01-21 RX ADMIN — Medication 3 MILLILITER(S): at 17:53

## 2018-01-21 RX ADMIN — Medication 400 UNIT(S): at 12:00

## 2018-01-21 RX ADMIN — Medication 100 MILLIGRAM(S): at 05:36

## 2018-01-21 RX ADMIN — SENNA PLUS 2 TABLET(S): 8.6 TABLET ORAL at 21:30

## 2018-01-21 RX ADMIN — Medication 8: at 12:00

## 2018-01-21 RX ADMIN — FAMOTIDINE 20 MILLIGRAM(S): 10 INJECTION INTRAVENOUS at 05:35

## 2018-01-21 RX ADMIN — Medication 3 MILLILITER(S): at 05:49

## 2018-01-21 RX ADMIN — SIMVASTATIN 40 MILLIGRAM(S): 20 TABLET, FILM COATED ORAL at 21:30

## 2018-01-21 RX ADMIN — Medication 100 MILLIGRAM(S): at 12:01

## 2018-01-21 RX ADMIN — Medication 20 MILLIGRAM(S): at 05:34

## 2018-01-21 RX ADMIN — Medication 3 MILLILITER(S): at 23:43

## 2018-01-21 RX ADMIN — RIVAROXABAN 20 MILLIGRAM(S): KIT at 17:15

## 2018-01-21 RX ADMIN — Medication 81 MILLIGRAM(S): at 12:00

## 2018-01-21 RX ADMIN — Medication 100 MILLIGRAM(S): at 15:10

## 2018-01-21 RX ADMIN — Medication 100 MILLIGRAM(S): at 21:30

## 2018-01-21 RX ADMIN — Medication 30 MILLIGRAM(S): at 12:00

## 2018-01-21 RX ADMIN — Medication 20 MILLIGRAM(S): at 17:15

## 2018-01-21 RX ADMIN — Medication 40 MILLIGRAM(S): at 05:34

## 2018-01-21 RX ADMIN — Medication 1 MILLIGRAM(S): at 12:00

## 2018-01-21 RX ADMIN — Medication 40 MILLIGRAM(S): at 17:15

## 2018-01-21 RX ADMIN — INSULIN GLARGINE 53 UNIT(S): 100 INJECTION, SOLUTION SUBCUTANEOUS at 21:30

## 2018-01-22 LAB
GLUCOSE BLDC GLUCOMTR-MCNC: 226 MG/DL — HIGH (ref 70–99)
GLUCOSE BLDC GLUCOMTR-MCNC: 282 MG/DL — HIGH (ref 70–99)
GLUCOSE BLDC GLUCOMTR-MCNC: 365 MG/DL — HIGH (ref 70–99)

## 2018-01-22 PROCEDURE — 99233 SBSQ HOSP IP/OBS HIGH 50: CPT

## 2018-01-22 RX ADMIN — Medication 60 MILLIGRAM(S): at 17:00

## 2018-01-22 RX ADMIN — Medication 30 MILLIGRAM(S): at 11:50

## 2018-01-22 RX ADMIN — Medication 3 MILLILITER(S): at 05:30

## 2018-01-22 RX ADMIN — Medication 100 MILLIGRAM(S): at 05:32

## 2018-01-22 RX ADMIN — Medication 100 MILLIGRAM(S): at 21:18

## 2018-01-22 RX ADMIN — RIVAROXABAN 20 MILLIGRAM(S): KIT at 17:00

## 2018-01-22 RX ADMIN — Medication 10: at 17:00

## 2018-01-22 RX ADMIN — Medication 4: at 07:45

## 2018-01-22 RX ADMIN — Medication 20 MILLIGRAM(S): at 05:31

## 2018-01-22 RX ADMIN — Medication 6: at 11:50

## 2018-01-22 RX ADMIN — SACUBITRIL AND VALSARTAN 1 TABLET(S): 24; 26 TABLET, FILM COATED ORAL at 05:31

## 2018-01-22 RX ADMIN — FAMOTIDINE 20 MILLIGRAM(S): 10 INJECTION INTRAVENOUS at 17:00

## 2018-01-22 RX ADMIN — Medication 81 MILLIGRAM(S): at 11:50

## 2018-01-22 RX ADMIN — Medication 400 UNIT(S): at 11:50

## 2018-01-22 RX ADMIN — Medication 100 MILLIGRAM(S): at 13:11

## 2018-01-22 RX ADMIN — TAMSULOSIN HYDROCHLORIDE 0.4 MILLIGRAM(S): 0.4 CAPSULE ORAL at 21:18

## 2018-01-22 RX ADMIN — SACUBITRIL AND VALSARTAN 1 TABLET(S): 24; 26 TABLET, FILM COATED ORAL at 17:00

## 2018-01-22 RX ADMIN — Medication 1 MILLIGRAM(S): at 11:50

## 2018-01-22 RX ADMIN — SENNA PLUS 2 TABLET(S): 8.6 TABLET ORAL at 21:18

## 2018-01-22 RX ADMIN — SIMVASTATIN 40 MILLIGRAM(S): 20 TABLET, FILM COATED ORAL at 21:18

## 2018-01-22 RX ADMIN — Medication 40 MILLIGRAM(S): at 05:31

## 2018-01-22 RX ADMIN — Medication 100 MILLIGRAM(S): at 11:50

## 2018-01-22 RX ADMIN — Medication 40 MILLIGRAM(S): at 17:00

## 2018-01-22 RX ADMIN — Medication 3 MILLILITER(S): at 11:14

## 2018-01-22 RX ADMIN — INSULIN GLARGINE 53 UNIT(S): 100 INJECTION, SOLUTION SUBCUTANEOUS at 21:57

## 2018-01-22 RX ADMIN — Medication 3 MILLILITER(S): at 17:28

## 2018-01-22 RX ADMIN — Medication 75 MILLIGRAM(S): at 05:31

## 2018-01-22 RX ADMIN — FAMOTIDINE 20 MILLIGRAM(S): 10 INJECTION INTRAVENOUS at 05:32

## 2018-01-22 NOTE — PROGRESS NOTE ADULT - ASSESSMENT
72 year old man with a PMHx notable for chronic Sys CHF, COPD, Stage 3 CKD, EMBER on CPAP, DM2, HTN, and HLD who presents with acute systolic CHF exacerbation.     PLAN  # Systolic CHF exacerbation  - c/w telemetry monitoring  - CXR 1/19 showed stable cardiomegaly with mild pulmonary vascular congestion  - IV lasix 40mg IVP Q12 hrs as per cardiology  - noted worsening Cr, consider lower lasix dose today   - c/w Entresto, statin & metoprolol  - c/w Strict input/output & daily weight monitoring, patient has lost ~ 116kg on admit 112kg on 1/22/18  - c/w continue BiPAP  - cardiac consult note read and appreciated  - 2D ECHO 1/19 EF 35 - 40%    #CAD status post CABG, PCI in 2011 and AICD  - cardiac enzymes borderline with troponin peaking at 0.425  - cardioogy following  - c/w ASA    # Fever/Bronchitis   resolved   - UA is negative  - CXR 1/19 showed no consolidation ? rll atelectasis . received one does anibx then was d/c'd by colleague on 1/19/18   +RVP on tanmuful  WBC is WNL      # Hypertension   - c/w Entresto & metoprolol    Questionable COPD history   - c/w BiPAP & add PRN nebs      # DM type 2   -- For now c/w Lantus 53 units SC QHS & Sliding scale with coverage  - Hgb A1C was 9.8      # HLD   - continue with simvastatin    # Constipation  - added colace and senna  - Also on lactulose    # Chronic atrial fibrillation   hr stable and controlled   - continue with Xarelto & metoprolol    # DVT Prophylaxis: Xarelto   GI prophylaxis: Pepcid 72 year old man with a PMHx notable for chronic Sys CHF, COPD, Stage 3 CKD, EMBER on CPAP, DM2, HTN, and HLD who presents with acute systolic CHF exacerbation.     PLAN  # Systolic CHF exacerbation  - c/w telemetry monitoring  - CXR 1/19 showed stable cardiomegaly with mild pulmonary vascular congestion  - IV lasix 40mg IVP Q12 hrs will change to to oral in am    - c/w Entresto, statin & metoprolol  - c/w Strict input/output & daily weight monitoring, patient has lost ~ 116kg on admit 112kg on 1/22/18  - c/w continue BiPAP  - cardiac consult note read and appreciated  - 2D ECHO 1/19 EF 35 - 40%   check oxygen level     #CAD status post CABG, PCI in 2011 and AICD  - cardiac enzymes borderline with troponin peaking at 0.425  - cardiology following  - c/w ASA    # Fever/Bronchitis   resolved   - UA is negative  - CXR 1/19 showed no consolidation ? rll atelectasis . received one does anibx then was d/c'd by colleague on 1/19/18   +RVP on tamiflul  WBC is WNL      # Hypertension   - c/w Entresto & metoprolol    COPD history  per pt but non compliant with inhaler sporadically and uses CPAP half of the time   advise pt that he should follow up with pulmonary doctor as oupt for  PFTs   - c/w BiPAP & add PRN nebs   change steroids to oral       # DM type 2   -- For now c/w Lantus 53 units SC QHS & Sliding scale with coverage  - Hgb A1C was 9.8      # HLD   - continue with simvastatin    # Constipation   bm on 1/20/18  continue with  colace and senna      # Chronic atrial fibrillation   hr stable and controlled   - continue with Xarelto & metoprolol    # DVT Prophylaxis: Xarelto   GI prophylaxis: Pepcid 72 year old man with a PMHx notable for chronic Sys CHF, COPD, Stage 3 CKD, EMBER on CPAP, DM2, HTN, and HLD who presents with acute systolic CHF exacerbation.     PLAN  # Systolic CHF exacerbation  - c/w telemetry monitoring  - CXR 1/19 showed stable cardiomegaly with mild pulmonary vascular congestion  - IV lasix 40mg IVP Q12 hrs will change to to oral in am    - c/w Entresto, statin & metoprolol  - c/w Strict input/output & daily weight monitoring, patient has lost ~ 116kg on admit 112kg on 1/22/18  - c/w continue BiPAP  - cardiac consult note read and appreciated  - 2D ECHO 1/19 EF 35 - 40%   check oxygen level     #CAD status post CABG, PCI in 2011 and AICD  - cardiac enzymes borderline with troponin peaking at 0.425  - cardiology following  - c/w ASA    # Fever/Bronchitis   resolved   - UA is negative  - CXR 1/19 showed no consolidation ? rll atelectasis . received one does anibx then was d/c'd by colleague on 1/19/18   +RVP on tamiflul  WBC is WNL      # Hypertension   - c/w Entresto & metoprolol    COPD history  per pt but non compliant with inhaler sporadically and uses CPAP half of the time   advise pt that he should follow up with pulmonary doctor as oupt for  PFTs   - c/w BiPAP & add PRN nebs   change steroids to oral       # DM type 2   -- For now c/w Lantus 53 units SC QHS & Sliding scale with coverage  - Hgb A1C was 9.8      # HLD   - continue with simvastatin    # Constipation   bm on 1/20/18  continue with  colace and senna      # Chronic atrial fibrillation   hr stable and controlled   - continue with Xarelto & metoprolol    mobid obesity   nutrition consult completed.   also provide education and  reinforcement of use of CPAP for EMBER    # DVT Prophylaxis: Xarelto   GI prophylaxis: Pepcid

## 2018-01-22 NOTE — PROGRESS NOTE ADULT - SUBJECTIVE AND OBJECTIVE BOX
Patient is a 72y old  Male who presents with a chief complaint of Patient was complaining of difficulty breathing. (17 Jan 2018 11:56)      HPI:  72 year old man with PMH of chronic CHF, COPD, CKD III, EMBER on CPAP, DM 2, HTN, and HLD.  He presents to ED with complaints of worsening SOB and leg swelling.  Patient has SOB at baseline which worsened tonight with swelling in both of his legs.  He denies chest pain, palpitation, lightheadedness, diaphoresis.  He is compliant with all medications and sees his doctors regularly.    In the ED, BNP 2177, troponin mildly elevated, EKG without ischemic changes; paced.  Labs consistent with CKD, leukocytosis without clear source of infection.  Had been HTN emergency in ED, was on nitro, drip stopped (17 Jan 2018 02:25)    MEDICATIONS  (STANDING):  ALBUTerol/ipratropium for Nebulization 3 milliLiter(s) Nebulizer every 6 hours  allopurinol 100 milliGRAM(s) Oral daily  aspirin enteric coated 81 milliGRAM(s) Oral daily  cholecalciferol 400 Unit(s) Oral daily  dextrose 5%. 1000 milliLiter(s) (50 mL/Hr) IV Continuous <Continuous>  dextrose 50% Injectable 12.5 Gram(s) IV Push once  dextrose 50% Injectable 25 Gram(s) IV Push once  dextrose 50% Injectable 25 Gram(s) IV Push once  docusate sodium 100 milliGRAM(s) Oral three times a day  famotidine    Tablet 20 milliGRAM(s) Oral two times a day  folic acid 1 milliGRAM(s) Oral daily  furosemide   Injectable 40 milliGRAM(s) IV Push every 12 hours  hydrocortisone sodium succinate Injectable 20 milliGRAM(s) IV Push every 12 hours  insulin glargine Injectable (LANTUS) 53 Unit(s) SubCutaneous at bedtime  insulin lispro (HumaLOG) corrective regimen sliding scale   SubCutaneous three times a day before meals  insulin lispro (HumaLOG) corrective regimen sliding scale   SubCutaneous at bedtime  metoprolol succinate ER 75 milliGRAM(s) Oral daily  oseltamivir 30 milliGRAM(s) Oral daily  rivaroxaban 20 milliGRAM(s) Oral every 24 hours  sacubitril 24 mG/valsartan 26 mG 1 Tablet(s) Oral two times a day  senna 2 Tablet(s) Oral at bedtime  simvastatin 40 milliGRAM(s) Oral at bedtime  tamsulosin 0.4 milliGRAM(s) Oral at bedtime    MEDICATIONS  (PRN):  acetaminophen   Tablet 650 milliGRAM(s) Oral every 6 hours PRN For Temp greater than 38 C (100.4 F)  dextrose Gel 1 Dose(s) Oral once PRN Blood Glucose LESS THAN 70 milliGRAM(s)/deciliter  glucagon  Injectable 1 milliGRAM(s) IntraMuscular once PRN Glucose LESS THAN 70 milligrams/deciliter  lactulose Syrup 20 Gram(s) Oral two times a day PRN constipation    Vital Signs Last 24 Hrs  T(C): 36.1 (22 Jan 2018 11:20), Max: 36.9 (22 Jan 2018 05:21)  T(F): 97 (22 Jan 2018 11:20), Max: 98.4 (22 Jan 2018 05:21)  HR: 59 (22 Jan 2018 11:20) (59 - 64)  BP: 116/65 (22 Jan 2018 11:20) (116/65 - 136/64)  BP(mean): --  RR: 16 (22 Jan 2018 11:20) (16 - 18)  SpO2: 97% (22 Jan 2018 11:20) (94% - 100%)      GENERAL: NAD, well-groomed, well-developed  HEAD:  Atraumatic, Normocephalic  EYES: EOMI, PERRLA, conjunctiva and sclera clear  ENMT: Moist mucous membranes  NECK: Supple, No JVD  NERVOUS SYSTEM:  Alert & Oriented X3, Motor Strength 5/5 B/L upper and lower extremities; DTRs 2+ intact and symmetric  CHEST/LUNG: Clear to auscultation bilaterally; No rales, rhonchi, wheezing, or rubs  HEART: Regular rate and rhythm; No murmurs, rubs, or gallops  ABDOMEN: Soft, Nontender, Nondistended; Bowel sounds present  EXTREMITIES:  2+ Peripheral Pulses, No clubbing, cyanosis, or edema    LABS:                                      12.8   5.3   )-----------( 116      ( 21 Jan 2018 07:22 )             38.6   01-21    140  |  101  |  61<H>  ----------------------------<  208<H>  3.7   |  32<H>  |  2.09<H>    Ca    8.1<L>          21 Jan 2018 07:22  Hemoglobin A1C, Whole Blood (01.17.18 @ 09:53)    Hemoglobin A1C, Whole Blood: 9.8: Method: Immunoassay       Reference Range                4.0-5.6%       High risk (prediabetic)        5.7-6.4%       Diabetic, diagnostic             >=6.5%       ADA diabetic treatment goal       <7.0%  The Hemoglobin A1c testing is NGSP-certified.Reference ranges are based  upon the 2010 recommendations of  the American Diabetes Association.  Interpretation may vary for children  and adolescents. %             CAPILLARY BLOOD GLUCOSE      POCT Blood Glucose.: 282 mg/dL (22 Jan 2018 11:46)  POCT Blood Glucose.: 229 mg/dL (22 Jan 2018 07:31)  POCT Blood Glucose.: 272 mg/dL (21 Jan 2018 21:29)  POCT Blood Glucose.: 249 mg/dL (21 Jan 2018 16:18)        RADIOLOGY & ADDITIONAL TESTS:      Imaging Personally Reviewed:  [ x] YES      Consultant(s) Notes Reviewed:  [x ] YES     Care Discussed with [x ] Consultants [X ] Patient [x ] Family  [x ]    [x ]  Other; RN Patient is a 72y old  Male who presents with a chief complaint of Patient was complaining of difficulty breathing. (17 Jan 2018 11:56)      HPI:  72 year old man with PMH of chronic CHF, COPD, CKD III, EMBER on CPAP, DM 2, HTN, and HLD.  He presents to ED with complaints of worsening SOB and leg swelling.  Patient has SOB at baseline which worsened tonight with swelling in both of his legs.  He denies chest pain, palpitation, lightheadedness, diaphoresis.  He is compliant with all medications and sees his doctors regularly.    In the ED, BNP 2177, troponin mildly elevated, EKG without ischemic changes; paced.  Labs consistent with CKD, leukocytosis without clear source of infection.  Had been HTN emergency in ED, was on nitro, drip stopped (17 Jan 2018 02:25)    MEDICATIONS  (STANDING):  ALBUTerol/ipratropium for Nebulization 3 milliLiter(s) Nebulizer every 6 hours  allopurinol 100 milliGRAM(s) Oral daily  aspirin enteric coated 81 milliGRAM(s) Oral daily  cholecalciferol 400 Unit(s) Oral daily  dextrose 5%. 1000 milliLiter(s) (50 mL/Hr) IV Continuous <Continuous>  dextrose 50% Injectable 12.5 Gram(s) IV Push once  dextrose 50% Injectable 25 Gram(s) IV Push once  dextrose 50% Injectable 25 Gram(s) IV Push once  docusate sodium 100 milliGRAM(s) Oral three times a day  famotidine    Tablet 20 milliGRAM(s) Oral two times a day  folic acid 1 milliGRAM(s) Oral daily  furosemide   Injectable 40 milliGRAM(s) IV Push every 12 hours  hydrocortisone sodium succinate Injectable 20 milliGRAM(s) IV Push every 12 hours  insulin glargine Injectable (LANTUS) 53 Unit(s) SubCutaneous at bedtime  insulin lispro (HumaLOG) corrective regimen sliding scale   SubCutaneous three times a day before meals  insulin lispro (HumaLOG) corrective regimen sliding scale   SubCutaneous at bedtime  metoprolol succinate ER 75 milliGRAM(s) Oral daily  oseltamivir 30 milliGRAM(s) Oral daily  rivaroxaban 20 milliGRAM(s) Oral every 24 hours  sacubitril 24 mG/valsartan 26 mG 1 Tablet(s) Oral two times a day  senna 2 Tablet(s) Oral at bedtime  simvastatin 40 milliGRAM(s) Oral at bedtime  tamsulosin 0.4 milliGRAM(s) Oral at bedtime    MEDICATIONS  (PRN):  acetaminophen   Tablet 650 milliGRAM(s) Oral every 6 hours PRN For Temp greater than 38 C (100.4 F)  dextrose Gel 1 Dose(s) Oral once PRN Blood Glucose LESS THAN 70 milliGRAM(s)/deciliter  glucagon  Injectable 1 milliGRAM(s) IntraMuscular once PRN Glucose LESS THAN 70 milligrams/deciliter  lactulose Syrup 20 Gram(s) Oral two times a day PRN constipation    Vital Signs Last 24 Hrs  T(C): 36.1 (22 Jan 2018 11:20), Max: 36.9 (22 Jan 2018 05:21)  T(F): 97 (22 Jan 2018 11:20), Max: 98.4 (22 Jan 2018 05:21)  HR: 59 (22 Jan 2018 11:20) (59 - 64)  BP: 116/65 (22 Jan 2018 11:20) (116/65 - 136/64)  BP(mean): --  RR: 16 (22 Jan 2018 11:20) (16 - 18)  SpO2: 97% (22 Jan 2018 11:20) (94% - 100%)    S: morbidly obese male  siiting on side of bed   GENERAL: NAD, well-groomed, well-developed  HEAD:  Atraumatic, Normocephalic  EYES: EOMI, PERRLA, conjunctiva and sclera clear  ENMT: Moist mucous membranes  NECK: Supple, No JVD  NERVOUS SYSTEM:  Alert & Oriented X3, Motor Strength 5/5 B/L upper and lower extremities; DTRs 2+ intact and symmetric  CHEST/LUNG: Clear to auscultation bilaterally; No rales, rhonchi, wheezing, or rubs  HEART: Regular rate and rhythm; No murmurs, rubs, or gallops  ABDOMEN: Soft, Nontender, Nondistended; Bowel sounds present  EXTREMITIES:  2+ Peripheral Pulses, No clubbing, cyanosis, or  no edema    LABS:                                    12.8   5.3   )-----------( 116      ( 21 Jan 2018 07:22 )             38.6        21 Jan 2018 07:22  Hemoglobin A1C, Whole Blood (01.17.18 @ 09:53)    Hemoglobin A1C, Whole Blood: 9.8: Method: Immunoassay       Reference Range                4.0-5.6%       High risk (prediabetic)        5.7-6.4%       Diabetic, diagnostic             >=6.5%       ADA diabetic treatment goal       <7.0%  The Hemoglobin A1c testing is NGSP-certified.Reference ranges are based  upon the 2010 recommendations of  the American Diabetes Association.  Interpretation may vary for children  and adolescents. %             CAPILLARY BLOOD GLUCOSE      POCT Blood Glucose.: 282 mg/dL (22 Jan 2018 11:46)  POCT Blood Glucose.: 229 mg/dL (22 Jan 2018 07:31)  POCT Blood Glucose.: 272 mg/dL (21 Jan 2018 21:29)  POCT Blood Glucose.: 249 mg/dL (21 Jan 2018 16:18)        RADIOLOGY & ADDITIONAL TESTS:      Imaging Personally Reviewed:  [ x] YES      Consultant(s) Notes Reviewed:  [x ] YES     Care Discussed with [x ] Consultants [X ] Patient [x ] Family  [x ]    [x ]  Other; RN

## 2018-01-23 LAB
ANION GAP SERPL CALC-SCNC: 9 MMOL/L — SIGNIFICANT CHANGE UP (ref 5–17)
BUN SERPL-MCNC: 60 MG/DL — HIGH (ref 7–23)
CALCIUM SERPL-MCNC: 8.8 MG/DL — SIGNIFICANT CHANGE UP (ref 8.5–10.1)
CHLORIDE SERPL-SCNC: 99 MMOL/L — SIGNIFICANT CHANGE UP (ref 96–108)
CO2 SERPL-SCNC: 31 MMOL/L — SIGNIFICANT CHANGE UP (ref 22–31)
CREAT SERPL-MCNC: 2.2 MG/DL — HIGH (ref 0.5–1.3)
GLUCOSE BLDC GLUCOMTR-MCNC: 267 MG/DL — HIGH (ref 70–99)
GLUCOSE BLDC GLUCOMTR-MCNC: 313 MG/DL — HIGH (ref 70–99)
GLUCOSE BLDC GLUCOMTR-MCNC: 342 MG/DL — HIGH (ref 70–99)
GLUCOSE BLDC GLUCOMTR-MCNC: 371 MG/DL — HIGH (ref 70–99)
GLUCOSE SERPL-MCNC: 283 MG/DL — HIGH (ref 70–99)
HCT VFR BLD CALC: 42.1 % — SIGNIFICANT CHANGE UP (ref 39–50)
HGB BLD-MCNC: 13.6 G/DL — SIGNIFICANT CHANGE UP (ref 13–17)
MAGNESIUM SERPL-MCNC: 2.4 MG/DL — SIGNIFICANT CHANGE UP (ref 1.6–2.6)
MCHC RBC-ENTMCNC: 30 PG — SIGNIFICANT CHANGE UP (ref 27–34)
MCHC RBC-ENTMCNC: 32.3 GM/DL — SIGNIFICANT CHANGE UP (ref 32–36)
MCV RBC AUTO: 92.8 FL — SIGNIFICANT CHANGE UP (ref 80–100)
PHOSPHATE SERPL-MCNC: 3.4 MG/DL — SIGNIFICANT CHANGE UP (ref 2.5–4.5)
PLATELET # BLD AUTO: 148 K/UL — LOW (ref 150–400)
POTASSIUM SERPL-MCNC: 4.5 MMOL/L — SIGNIFICANT CHANGE UP (ref 3.5–5.3)
POTASSIUM SERPL-SCNC: 4.5 MMOL/L — SIGNIFICANT CHANGE UP (ref 3.5–5.3)
RBC # BLD: 4.53 M/UL — SIGNIFICANT CHANGE UP (ref 4.2–5.8)
RBC # FLD: 14.9 % — SIGNIFICANT CHANGE UP (ref 11–15)
SODIUM SERPL-SCNC: 139 MMOL/L — SIGNIFICANT CHANGE UP (ref 135–145)
WBC # BLD: 9.6 K/UL — SIGNIFICANT CHANGE UP (ref 3.8–10.5)
WBC # FLD AUTO: 9.6 K/UL — SIGNIFICANT CHANGE UP (ref 3.8–10.5)

## 2018-01-23 PROCEDURE — 99233 SBSQ HOSP IP/OBS HIGH 50: CPT

## 2018-01-23 RX ADMIN — Medication 100 MILLIGRAM(S): at 15:22

## 2018-01-23 RX ADMIN — Medication 40 MILLIGRAM(S): at 17:05

## 2018-01-23 RX ADMIN — Medication 400 UNIT(S): at 11:45

## 2018-01-23 RX ADMIN — Medication 1 MILLIGRAM(S): at 11:45

## 2018-01-23 RX ADMIN — TAMSULOSIN HYDROCHLORIDE 0.4 MILLIGRAM(S): 0.4 CAPSULE ORAL at 22:02

## 2018-01-23 RX ADMIN — FAMOTIDINE 20 MILLIGRAM(S): 10 INJECTION INTRAVENOUS at 05:30

## 2018-01-23 RX ADMIN — RIVAROXABAN 20 MILLIGRAM(S): KIT at 17:05

## 2018-01-23 RX ADMIN — Medication 3 MILLILITER(S): at 17:11

## 2018-01-23 RX ADMIN — Medication 8: at 17:04

## 2018-01-23 RX ADMIN — SACUBITRIL AND VALSARTAN 1 TABLET(S): 24; 26 TABLET, FILM COATED ORAL at 17:05

## 2018-01-23 RX ADMIN — Medication 3 MILLILITER(S): at 00:09

## 2018-01-23 RX ADMIN — Medication 81 MILLIGRAM(S): at 11:45

## 2018-01-23 RX ADMIN — SIMVASTATIN 40 MILLIGRAM(S): 20 TABLET, FILM COATED ORAL at 21:44

## 2018-01-23 RX ADMIN — INSULIN GLARGINE 53 UNIT(S): 100 INJECTION, SOLUTION SUBCUTANEOUS at 21:44

## 2018-01-23 RX ADMIN — Medication 30 MILLIGRAM(S): at 11:45

## 2018-01-23 RX ADMIN — SENNA PLUS 2 TABLET(S): 8.6 TABLET ORAL at 21:44

## 2018-01-23 RX ADMIN — Medication 100 MILLIGRAM(S): at 05:30

## 2018-01-23 RX ADMIN — Medication 2: at 21:47

## 2018-01-23 RX ADMIN — Medication 10: at 11:49

## 2018-01-23 RX ADMIN — Medication 6: at 08:07

## 2018-01-23 RX ADMIN — FAMOTIDINE 20 MILLIGRAM(S): 10 INJECTION INTRAVENOUS at 17:05

## 2018-01-23 RX ADMIN — Medication 75 MILLIGRAM(S): at 08:07

## 2018-01-23 RX ADMIN — Medication 100 MILLIGRAM(S): at 21:44

## 2018-01-23 RX ADMIN — SACUBITRIL AND VALSARTAN 1 TABLET(S): 24; 26 TABLET, FILM COATED ORAL at 05:30

## 2018-01-23 RX ADMIN — Medication 40 MILLIGRAM(S): at 05:30

## 2018-01-23 RX ADMIN — Medication 3 MILLILITER(S): at 05:46

## 2018-01-23 RX ADMIN — Medication 60 MILLIGRAM(S): at 05:35

## 2018-01-23 RX ADMIN — Medication 100 MILLIGRAM(S): at 11:45

## 2018-01-23 NOTE — PHYSICAL THERAPY INITIAL EVALUATION ADULT - GENERAL OBSERVATIONS, REHAB EVAL
Pt was seen in sitting c O2 at 4l/min  through nasal cannula and cardiac monitor donned, alert and Ox4 and comfortable. SpO2 was monitored throughout P.T. intervention.

## 2018-01-23 NOTE — PROGRESS NOTE ADULT - SUBJECTIVE AND OBJECTIVE BOX
Patient is a 72y old  Male who presents with a chief complaint of Patient was complaining of difficulty breathing. (17 Jan 2018 11:56)      HPI:  72 year old man with PMH of chronic CHF, COPD, CKD III, EMBER on CPAP, DM 2, HTN, and HLD.  He presents to ED with complaints of worsening SOB and leg swelling.  Patient has SOB at baseline which worsened tonight with swelling in both of his legs.  He denies chest pain, palpitation, lightheadedness, diaphoresis.  He is compliant with all medications and sees his doctors regularly.    In the ED, BNP 2177, troponin mildly elevated, EKG without ischemic changes; paced.  Labs consistent with CKD, leukocytosis without clear source of infection.  Had been HTN emergency in ED, was on nitro, drip stopped (17 Jan 2018 02:25)    MEDICATIONS  (STANDING):  ALBUTerol/ipratropium for Nebulization 3 milliLiter(s) Nebulizer every 6 hours  allopurinol 100 milliGRAM(s) Oral daily  aspirin enteric coated 81 milliGRAM(s) Oral daily  cholecalciferol 400 Unit(s) Oral daily  dextrose 5%. 1000 milliLiter(s) (50 mL/Hr) IV Continuous <Continuous>  dextrose 50% Injectable 12.5 Gram(s) IV Push once  dextrose 50% Injectable 25 Gram(s) IV Push once  dextrose 50% Injectable 25 Gram(s) IV Push once  docusate sodium 100 milliGRAM(s) Oral three times a day  famotidine    Tablet 20 milliGRAM(s) Oral two times a day  folic acid 1 milliGRAM(s) Oral daily  furosemide   Injectable 40 milliGRAM(s) IV Push every 12 hours  insulin glargine Injectable (LANTUS) 53 Unit(s) SubCutaneous at bedtime  insulin lispro (HumaLOG) corrective regimen sliding scale   SubCutaneous three times a day before meals  insulin lispro (HumaLOG) corrective regimen sliding scale   SubCutaneous at bedtime  metoprolol succinate ER 75 milliGRAM(s) Oral daily  oseltamivir 30 milliGRAM(s) Oral daily  predniSONE   Tablet 60 milliGRAM(s) Oral daily  rivaroxaban 20 milliGRAM(s) Oral every 24 hours  sacubitril 24 mG/valsartan 26 mG 1 Tablet(s) Oral two times a day  senna 2 Tablet(s) Oral at bedtime  simvastatin 40 milliGRAM(s) Oral at bedtime  tamsulosin 0.4 milliGRAM(s) Oral at bedtime    MEDICATIONS  (PRN):  acetaminophen   Tablet 650 milliGRAM(s) Oral every 6 hours PRN For Temp greater than 38 C (100.4 F)  dextrose Gel 1 Dose(s) Oral once PRN Blood Glucose LESS THAN 70 milliGRAM(s)/deciliter  glucagon  Injectable 1 milliGRAM(s) IntraMuscular once PRN Glucose LESS THAN 70 milligrams/deciliter  lactulose Syrup 20 Gram(s) Oral two times a day PRN constipation    Vital Signs Last 24 Hrs  T(C): 36.1 (23 Jan 2018 05:27), Max: 36.3 (22 Jan 2018 23:23)  T(F): 97 (23 Jan 2018 05:27), Max: 97.4 (22 Jan 2018 23:23)  HR: 60 (23 Jan 2018 10:51) (59 - 71)  BP: 152/77 (23 Jan 2018 10:51) (141/61 - 152/77)  BP(mean): --  RR: 18 (23 Jan 2018 10:51) (18 - 161)  SpO2: 95% (23 Jan 2018 10:51) (95% - 100%)      S: morbidly obese male  sitting on side of bed   GENERAL: NAD, well-groomed, well-developed  HEAD:  Atraumatic, Normocephalic  EYES: EOMI, PERRLA, conjunctiva and sclera clear  ENMT: Moist mucous membranes  NECK: Supple, No JVD  NERVOUS SYSTEM:  Alert & Oriented X3, Motor Strength 5/5 B/L upper and lower extremities; DTRs 2+ intact and symmetric  CHEST/LUNG: Clear to auscultation bilaterally; No rales, rhonchi, wheezing, or rubs  HEART: Regular rate and rhythm; No murmurs, rubs, or gallops  ABDOMEN: Soft, Nontender, Nondistended; Bowel sounds present  EXTREMITIES:  2+ Peripheral Pulses, No clubbing, cyanosis, or  no edema    LABS:                           13.6   9.6   )-----------( 148      ( 23 Jan 2018 08:56 )             42.1   01-23    139  |  99  |  60<H>  ----------------------------<  283<H>  4.5   |  31  |  2.20<H>    Ca    8.8      23 Jan 2018 08:56  Phos  3.4     01-23  Mg     2.4     01-23 21 Jan 2018 07:22  Hemoglobin A1C, Whole Blood (01.17.18 @ 09:53)    Hemoglobin A1C, Whole Blood: 9.8: Method: Immunoassay       Reference Range                4.0-5.6%       High risk (prediabetic)        5.7-6.4%       Diabetic, diagnostic             >=6.5%       ADA diabetic treatment goal       <7.0%  The Hemoglobin A1c testing is NGSP-certified.Reference ranges are based  upon the 2010 recommendations of  the American Diabetes Association.  Interpretation may vary for children  and adolescents. %             CAPILLARY BLOOD GLUCOSE    POCT Blood Glucose.: 267 mg/dL (23 Jan 2018 07:42)  POCT Blood Glucose.: 226 mg/dL (22 Jan 2018 21:17)  POCT Blood Glucose.: 365 mg/dL (22 Jan 2018 16:32)  POCT Blood Glucose.: 282 mg/dL (22 Jan 2018 11:46)    RADIOLOGY & ADDITIONAL TESTS:      Imaging Personally Reviewed:  [ x] YES      Consultant(s) Notes Reviewed:  [x ] YES     Care Discussed with [x ] Consultants [X ] Patient [x ] Family  [x ]    [x ]  Other; RN

## 2018-01-23 NOTE — PROGRESS NOTE ADULT - ASSESSMENT
72 year old man with a PMHx notable for chronic Sys CHF, COPD, Stage 3 CKD, EMBER on CPAP, DM2, HTN, and HLD who presents with acute systolic CHF exacerbation.     PLAN  # Systolic CHF exacerbation  - c/w telemetry monitoring  - CXR 1/19 showed stable cardiomegaly with mild pulmonary vascular congestion  - IV lasix 40mg IVP Q12 hrs will change to to oral in am    - c/w Entresto, statin & metoprolol  - c/w Strict input/output & daily weight monitoring, patient has lost ~ 116kg on admit 112kg on 1/23/18  - c/w continue BiPAP  - cardiac consult note read and appreciated  - 2D ECHO 1/19 EF 35 - 40%   check oxygen level     #CAD status post CABG, PCI in 2011 and AICD  - cardiac enzymes borderline with troponin peaking at 0.425  - cardiology following  - c/w ASA    # Fever/Bronchitis   resolved   - UA is negative  - CXR 1/19 showed no consolidation ? rll atelectasis . received one does anibx then was d/c'd by colleague on 1/19/18   +RVP on tamiflul  WBC is WNL      # Hypertension   - c/w Entresto & metoprolol    COPD history  per pt but non compliant with inhaler sporadically and uses CPAP half of the time   advise pt that he should follow up with pulmonary doctor as oupt for  PFTs   - c/w BiPAP & add PRN nebs   change steroids to oral       # DM type 2   -- For now c/w Lantus 53 units SC QHS & Sliding scale with coverage  - Hgb A1C was 9.8      # HLD   - continue with simvastatin    # Constipation   bm on 1/20/18 and ain on 1/22/18  continue with  colace and senna      # Chronic atrial fibrillation   hr stable and controlled   - continue with Xarelto & metoprolol    mobid obesity   nutrition consult completed.   also provide education and  reinforcement of use of CPAP for EMBER    # DVT Prophylaxis: Xarelto   GI prophylaxis: Pepcid

## 2018-01-24 ENCOUNTER — TRANSCRIPTION ENCOUNTER (OUTPATIENT)
Age: 73
End: 2018-01-24

## 2018-01-24 VITALS — OXYGEN SATURATION: 95 %

## 2018-01-24 LAB
CULTURE RESULTS: SIGNIFICANT CHANGE UP
CULTURE RESULTS: SIGNIFICANT CHANGE UP
GLUCOSE BLDC GLUCOMTR-MCNC: 182 MG/DL — HIGH (ref 70–99)
SPECIMEN SOURCE: SIGNIFICANT CHANGE UP
SPECIMEN SOURCE: SIGNIFICANT CHANGE UP

## 2018-01-24 PROCEDURE — 99239 HOSP IP/OBS DSCHRG MGMT >30: CPT

## 2018-01-24 RX ORDER — IPRATROPIUM/ALBUTEROL SULFATE 18-103MCG
3 AEROSOL WITH ADAPTER (GRAM) INHALATION
Qty: 1 | Refills: 0
Start: 2018-01-24 | End: 2018-02-22

## 2018-01-24 RX ORDER — ALBUTEROL 90 UG/1
2 AEROSOL, METERED ORAL
Qty: 1 | Refills: 0
Start: 2018-01-24 | End: 2018-02-22

## 2018-01-24 RX ORDER — SENNA PLUS 8.6 MG/1
2 TABLET ORAL
Qty: 60 | Refills: 0
Start: 2018-01-24 | End: 2018-02-22

## 2018-01-24 RX ADMIN — Medication 100 MILLIGRAM(S): at 06:17

## 2018-01-24 RX ADMIN — Medication 100 MILLIGRAM(S): at 11:58

## 2018-01-24 RX ADMIN — Medication 1 MILLIGRAM(S): at 11:58

## 2018-01-24 RX ADMIN — Medication 81 MILLIGRAM(S): at 11:58

## 2018-01-24 RX ADMIN — SACUBITRIL AND VALSARTAN 1 TABLET(S): 24; 26 TABLET, FILM COATED ORAL at 06:17

## 2018-01-24 RX ADMIN — Medication 3 MILLILITER(S): at 00:42

## 2018-01-24 RX ADMIN — Medication 3 MILLILITER(S): at 11:14

## 2018-01-24 RX ADMIN — Medication 40 MILLIGRAM(S): at 06:17

## 2018-01-24 RX ADMIN — Medication 2: at 08:00

## 2018-01-24 RX ADMIN — FAMOTIDINE 20 MILLIGRAM(S): 10 INJECTION INTRAVENOUS at 06:17

## 2018-01-24 RX ADMIN — Medication 75 MILLIGRAM(S): at 06:44

## 2018-01-24 RX ADMIN — Medication 30 MILLIGRAM(S): at 11:58

## 2018-01-24 RX ADMIN — Medication 400 UNIT(S): at 11:58

## 2018-01-24 RX ADMIN — Medication 60 MILLIGRAM(S): at 06:17

## 2018-01-24 RX ADMIN — Medication 3 MILLILITER(S): at 05:43

## 2018-01-24 NOTE — DISCHARGE NOTE ADULT - CARE PROVIDERS DIRECT ADDRESSES
[email or web address]

## 2018-01-24 NOTE — DISCHARGE NOTE ADULT - PLAN OF CARE
continue with meds follow up cardiology continue with bronchodilators continue with insulin continue with statin

## 2018-01-24 NOTE — DISCHARGE NOTE ADULT - CARE PROVIDER_API CALL
Sameer Montgomery), Cardiology  [address]  [address]  [place] [address]  Phone: [phone]  Fax: [phone]    Diego Mack), Internal Medicine; Pulmonary Disease  [address]  [place] [address]  Phone: [phone]  Fax: [phone]

## 2018-01-24 NOTE — DISCHARGE NOTE ADULT - PATIENT PORTAL LINK FT
“You can access the Follow[facility] Patient Portal, offered by [facility], by registering with the following website: [email or web address]”

## 2018-01-24 NOTE — DISCHARGE NOTE ADULT - SECONDARY DIAGNOSIS.
Chronic atrial fibrillation Chronic obstructive pulmonary disease, unspecified COPD type Essential hypertension Type 2 diabetes mellitus with complication, with long-term current use of insulin High cholesterol EMBER and COPD overlap syndrome

## 2018-01-24 NOTE — DISCHARGE NOTE ADULT - CARE PLAN
Principal Discharge DX:	Acute on chronic systolic congestive heart failure  Goal:	continue with meds  Assessment and plan of treatment:	follow up cardiology  Secondary Diagnosis:	Chronic atrial fibrillation  Goal:	continue with meds  Secondary Diagnosis:	Chronic obstructive pulmonary disease, unspecified COPD type  Goal:	continue with bronchodilators  Secondary Diagnosis:	Essential hypertension  Goal:	continue with meds  Secondary Diagnosis:	Type 2 diabetes mellitus with complication, with long-term current use of insulin  Goal:	continue with insulin  Secondary Diagnosis:	High cholesterol  Goal:	continue with statin  Secondary Diagnosis:	EMBER and COPD overlap syndrome

## 2018-01-24 NOTE — DISCHARGE NOTE ADULT - HOSPITAL COURSE
72 year old man with a PMHx notable for chronic Sys CHF, COPD, Stage 3 CKD, EMBER on CPAP, DM2, HTN, and HLD who presents with acute systolic CHF exacerbation.     PLAN  # Systolic CHF exacerbation  - c/w telemetry monitoring  - CXR 1/19 showed stable cardiomegaly with mild pulmonary vascular congestion  - IV lasix 40mg IVP Q12 hrs will change to to oral in am    - c/w Entresto, statin & metoprolol  - c/w Strict input/output & daily weight monitoring, patient has lost ~ 116kg on admit 112kg on 1/23/18  - c/w continue BiPAP  - cardiac consult note read and appreciated  - 2D ECHO 1/19 EF 35 - 40%   check oxygen level     #CAD status post CABG, PCI in 2011 and AICD  - cardiac enzymes borderline with troponin peaking at 0.425  - cardiology following  - c/w ASA    # Fever/Bronchitis   resolved   - UA is negative  - CXR 1/19 showed no consolidation ? rll atelectasis . received one dose antibx then was d/c'd by colleague on 1/19/18   +RVP on Tamiflu  WBC is WNL      # Hypertension   - c/w Entresto & metoprolol    COPD history  per pt but non compliant with inhaler sporadically and uses CPAP half of the time   advise pt that he should follow up with pulmonary doctor as oupt for  PFTs   - c/w BiPAP & add PRN nebs   change steroids to oral   on ellipita as outpt follows withh dr. moraes reinforced to patient and wife need to follow up .   pt was assessed for home oxygen and did not qualify    will also obtain nebulizer machine for patient       #  DM type 2   -- For now c/w Lantus 53 units SC QHS & Sliding scale with coverage  - Hgb A1C was 9.8      # HLD   - continue with simvastatin    # Constipation   bm on 1/20/18 and again on 1/22/18  continue with  colace and senna      # Chronic atrial fibrillation   hr stable and controlled   - continue with Xarelto & metoprolol    mobid obesity   nutrition consult completed.   also provide education and  reinforcement of use of CPAP for EMBER    # DVT Prophylaxis: Xarelto   GI prophylaxis: Pepcid 72 year old man with a PMHx notable for chronic Sys CHF, COPD, Stage 3 CKD, EMBER on CPAP, DM2, HTN, and HLD who presents with acute systolic CHF exacerbation.     PLAN  # Systolic CHF exacerbation  - c/w telemetry monitoring  - CXR 1/19 showed stable cardiomegaly with mild pulmonary vascular congestion  - IV lasix 40mg IVP Q12 hrs will change to to oral in am    - c/w Entresto, statin & metoprolol  - c/w Strict input/output & daily weight monitoring, patient has lost ~ 116kg on admit 112kg on 1/23/18  - c/w continue BiPAP  - cardiac consult note read and appreciated  - 2D ECHO 1/19 EF 35 - 40%   checked oxygen level and wnl after ambulation   educated fluid restriction     #CAD status post CABG, PCI in 2011 and AICD  - cardiac enzymes borderline with troponin peaking at 0.425  - cardiology following  - c/w ASA    # Fever/Bronchitis   resolved   - UA is negative  - CXR 1/19 showed no consolidation ? rll atelectasis . received one dose antibx then was d/c'd by colleague on 1/19/18   +RVP on Tamiflu  WBC is WNL      # Hypertension   - c/w Entresto & metoprolol    COPD history  per pt but non compliant with inhaler sporadically and uses CPAP half of the time   advise pt that he should follow up with pulmonary doctor as oupt for  PFTs   - c/w BiPAP & add PRN nebs   change steroids to oral   on ellipita as outpt follows withh dr. moraes reinforced to patient and wife need to follow up .   pt was assessed for home oxygen and did not qualify    will also obtain nebulizer machine for patient       #  DM type 2   -- For now c/w Lantus 53 units SC QHS & Sliding scale with coverage  - Hgb A1C was 9.8      # HLD   - continue with simvastatin    # Constipation   bm on 1/20/18 and again on 1/22/18  continue with  colace and senna      # Chronic atrial fibrillation   hr stable and controlled   - continue with Xarelto & metoprolol    mobid obesity   nutrition consult completed.   also provide education and  reinforcement of use of CPAP for EMBER    # DVT Prophylaxis: Xarelto   GI prophylaxis: Pepcid    TIME SPENT COMPLETING DISCHARGE AND COORDINATING CARE WITH NURSING,  AND CASE MANAGEMENT APPROX 40MINUTES 72 year old man with a PMHx notable for chronic Sys CHF, COPD, Stage 3 CKD, EMBER on CPAP, DM2, HTN, and HLD who presents with acute systolic CHF exacerbation.     PLAN  # Systolic CHF exacerbation  - c/w telemetry monitoring  - CXR 1/19 showed stable cardiomegaly with mild pulmonary vascular congestion  - IV lasix 40mg IVP Q12 hrs will change to to oral in am    - c/w Entresto, statin & metoprolol  - c/w Strict input/output & daily weight monitoring, patient has lost ~ 116kg on admit 112kg on 1/23/18  - c/w continue BiPAP  - cardiac consult note read and appreciated  - 2D ECHO 1/19 EF 35 - 40%   checked oxygen level and wnl after ambulation   educated fluid restriction     #CAD status post CABG, PCI in 2011 and AICD  - cardiac enzymes borderline with troponin peaking at 0.425  - cardiology following  - c/w ASA    # Fever/Bronchitis/FLU positive    resolved   - UA is negative  - CXR 1/19 showed no consolidation ? rll atelectasis . received one dose antibx then was d/c'd by colleague on 1/19/18   +RVP on Tamiflu completed cousre  WBC is WNL      # Hypertension   - c/w Entresto & metoprolol    COPD history  per pt but non compliant with inhaler sporadically and uses CPAP half of the time   advise pt that he should follow up with pulmonary doctor as oupt for  PFTs   - c/w BiPAP & add PRN nebs   change steroids to oral   on ellipita as outpt follows withh dr. moraes reinforced to patient and wife need to follow up .   pt was assessed for home oxygen and did not qualify    will also obtain nebulizer machine for patient       #  DM type 2   -- For now c/w Lantus 53 units SC QHS & Sliding scale with coverage  - Hgb A1C was 9.8      # HLD   - continue with simvastatin    # Constipation   bm on 1/20/18 and again on 1/22/18  continue with  colace and senna      # Chronic atrial fibrillation   hr stable and controlled   - continue with Xarelto & metoprolol    mobid obesity   nutrition consult completed.   also provide education and  reinforcement of use of CPAP for EMBER    # DVT Prophylaxis: Xarelto   GI prophylaxis: Pepcid    TIME SPENT COMPLETING DISCHARGE AND COORDINATING CARE WITH NURSING,  AND CASE MANAGEMENT APPROX 40MINUTES

## 2018-01-24 NOTE — PROGRESS NOTE ADULT - PROVIDER SPECIALTY LIST ADULT
Cardiology
Hospitalist

## 2018-01-24 NOTE — DISCHARGE NOTE ADULT - MEDICATION SUMMARY - MEDICATIONS TO TAKE
I will START or STAY ON the medications listed below when I get home from the hospital:    predniSONE 10 mg oral tablet  -- 5 tab(s) oral - by mouth once a day x 1 days  4 tab(s) oral - by mouth once a day x 1 days  3 tab(s) oral - by mouth once a day x 1 days  2 tab(s) oral - by mouth once a day x 1 days  1 tab(s) oral - by mouth once a day x 1 days  -- It is very important that you take or use this exactly as directed.  Do not skip doses or discontinue unless directed by your doctor.  Obtain medical advice before taking any non-prescription drugs as some may affect the action of this medication.  Take with food or milk.    -- Indication: For COPD (chronic obstructive pulmonary disease)    aspirin 81 mg oral tablet  -- 1 tab(s) by mouth once a day  -- Indication: For general    Entresto 24 mg-26 mg oral tablet  -- 1 tab(s) by mouth 2 times a day  -- Indication: For Htn    tamsulosin 0.4 mg oral capsule  -- 1 cap(s) by mouth once a day  -- Indication: For bph    Xarelto 20 mg oral tablet  -- 1 tab(s) by mouth once a day (in the evening)  -- Indication: For Afib    nateglinide 120 mg oral tablet  -- 1 tab(s) by mouth 3 times a day (before meals)  -- Indication: For dm    Toujeo SoloStar 300 units/mL subcutaneous solution  -- 53  subcutaneous once a day (at bedtime)  -- Indication: For dm    allopurinol 100 mg oral tablet  -- 2 tab(s) by mouth once a day  -- Indication: For gout    Zocor 40 mg oral tablet  -- 1 tab(s) by mouth once a day (at bedtime)  -- Indication: For Hld    Metoprolol Succinate ER 50 mg oral tablet, extended release  -- 1.5 tab(s) by mouth once a day  -- Indication: For Htn    ipratropium-albuterol 0.5 mg-2.5 mg/3 mLinhalation solution  -- 3 milliliter(s) inhaled every 6 hours, As Needed SOB wheezing  -- Indication: For COPD (chronic obstructive pulmonary disease)    Incruse Ellipta 62.5 mcg/inh inhalation powder  -- 1 puff(s) inhaled once a day  -- Indication: For COPD (chronic obstructive pulmonary disease)    Ventolin HFA 90 mcg/inh inhalation aerosol  -- 2 puff(s) inhaled every 6 hours, As Needed sob/wheezing  -- For inhalation only.  It is very important that you take or use this exactly as directed.  Do not skip doses or discontinue unless directed by your doctor.  Obtain medical advice before taking any non-prescription drugs as some may affect the action of this medication.  Shake well before use.    -- Indication: For COPD (chronic obstructive pulmonary disease)    Lasix 40 mg oral tablet  -- 1 tab(s) by mouth once a day  -- Indication: For Chf    ranitidine 150 mg oral tablet  -- tab(s) by mouth 2 times a day  -- Indication: For gerd    senna oral tablet  -- 2 tab(s) by mouth once a day (at bedtime)  -- Indication: For COnstipation    Omega-3  -- 300 milligram(s) by mouth once a day  -- Indication: For general    folic acid 1 mg oral tablet  -- 1 tab(s) by mouth once a day  -- Indication: For general    Vitamin D3  -- 1000 milligram(s) by mouth once a day  -- Indication: For general

## 2018-01-26 DIAGNOSIS — F17.210 NICOTINE DEPENDENCE, CIGARETTES, UNCOMPLICATED: ICD-10-CM

## 2018-01-26 DIAGNOSIS — N40.0 BENIGN PROSTATIC HYPERPLASIA WITHOUT LOWER URINARY TRACT SYMPTOMS: ICD-10-CM

## 2018-01-26 DIAGNOSIS — I13.0 HYPERTENSIVE HEART AND CHRONIC KIDNEY DISEASE WITH HEART FAILURE AND STAGE 1 THROUGH STAGE 4 CHRONIC KIDNEY DISEASE, OR UNSPECIFIED CHRONIC KIDNEY DISEASE: ICD-10-CM

## 2018-01-26 DIAGNOSIS — I50.23 ACUTE ON CHRONIC SYSTOLIC (CONGESTIVE) HEART FAILURE: ICD-10-CM

## 2018-01-26 DIAGNOSIS — K59.00 CONSTIPATION, UNSPECIFIED: ICD-10-CM

## 2018-01-26 DIAGNOSIS — I48.2 CHRONIC ATRIAL FIBRILLATION: ICD-10-CM

## 2018-01-26 DIAGNOSIS — G47.33 OBSTRUCTIVE SLEEP APNEA (ADULT) (PEDIATRIC): ICD-10-CM

## 2018-01-26 DIAGNOSIS — M10.9 GOUT, UNSPECIFIED: ICD-10-CM

## 2018-01-26 DIAGNOSIS — Z95.810 PRESENCE OF AUTOMATIC (IMPLANTABLE) CARDIAC DEFIBRILLATOR: ICD-10-CM

## 2018-01-26 DIAGNOSIS — E66.01 MORBID (SEVERE) OBESITY DUE TO EXCESS CALORIES: ICD-10-CM

## 2018-01-26 DIAGNOSIS — E78.5 HYPERLIPIDEMIA, UNSPECIFIED: ICD-10-CM

## 2018-01-26 DIAGNOSIS — E11.9 TYPE 2 DIABETES MELLITUS WITHOUT COMPLICATIONS: ICD-10-CM

## 2018-01-26 DIAGNOSIS — I51.7 CARDIOMEGALY: ICD-10-CM

## 2018-01-26 DIAGNOSIS — Z79.4 LONG TERM (CURRENT) USE OF INSULIN: ICD-10-CM

## 2018-01-26 DIAGNOSIS — N18.3 CHRONIC KIDNEY DISEASE, STAGE 3 (MODERATE): ICD-10-CM

## 2018-01-26 DIAGNOSIS — J44.9 CHRONIC OBSTRUCTIVE PULMONARY DISEASE, UNSPECIFIED: ICD-10-CM

## 2018-01-26 DIAGNOSIS — Z95.1 PRESENCE OF AORTOCORONARY BYPASS GRAFT: ICD-10-CM

## 2018-01-26 DIAGNOSIS — Z79.01 LONG TERM (CURRENT) USE OF ANTICOAGULANTS: ICD-10-CM

## 2018-01-26 DIAGNOSIS — Z79.82 LONG TERM (CURRENT) USE OF ASPIRIN: ICD-10-CM

## 2018-08-03 NOTE — DISCHARGE NOTE ADULT - BECAUSE OF A PHYSICAL, MENTAL OR EMOTIONAL CONDITION, DO YOU HAVE DIFFICULTY DOING  ERRANDS ALONE LIKE VISITING A DOCTOR'S OFFICE OR SHOPPING (15 YEARS AND OLDER)
I have reviewed discharge instructions with the patient. The patient verbalized understanding. Patient armband removed and shredded. VSS.  Patient verbalized understanding of how to properly take prescribed medications No

## 2019-12-08 ENCOUNTER — INPATIENT (INPATIENT)
Facility: HOSPITAL | Age: 74
LOS: 2 days | Discharge: HOME HEALTH | End: 2019-12-11
Attending: INTERNAL MEDICINE | Admitting: INTERNAL MEDICINE
Payer: MEDICARE

## 2019-12-08 VITALS
HEIGHT: 66 IN | SYSTOLIC BLOOD PRESSURE: 148 MMHG | WEIGHT: 242.95 LBS | RESPIRATION RATE: 33 BRPM | OXYGEN SATURATION: 98 % | HEART RATE: 60 BPM | TEMPERATURE: 98 F | DIASTOLIC BLOOD PRESSURE: 86 MMHG

## 2019-12-08 DIAGNOSIS — J44.9 CHRONIC OBSTRUCTIVE PULMONARY DISEASE, UNSPECIFIED: ICD-10-CM

## 2019-12-08 DIAGNOSIS — J18.9 PNEUMONIA, UNSPECIFIED ORGANISM: ICD-10-CM

## 2019-12-08 DIAGNOSIS — Z98.89 OTHER SPECIFIED POSTPROCEDURAL STATES: Chronic | ICD-10-CM

## 2019-12-08 DIAGNOSIS — Z95.1 PRESENCE OF AORTOCORONARY BYPASS GRAFT: Chronic | ICD-10-CM

## 2019-12-08 DIAGNOSIS — Z95.0 PRESENCE OF CARDIAC PACEMAKER: Chronic | ICD-10-CM

## 2019-12-08 DIAGNOSIS — I50.23 ACUTE ON CHRONIC SYSTOLIC (CONGESTIVE) HEART FAILURE: ICD-10-CM

## 2019-12-08 LAB
ALBUMIN SERPL ELPH-MCNC: 3.9 G/DL — SIGNIFICANT CHANGE UP (ref 3.3–5)
ALP SERPL-CCNC: 130 U/L — HIGH (ref 40–120)
ALT FLD-CCNC: 62 U/L — SIGNIFICANT CHANGE UP (ref 12–78)
ANION GAP SERPL CALC-SCNC: 10 MMOL/L — SIGNIFICANT CHANGE UP (ref 5–17)
APTT BLD: 30.2 SEC — SIGNIFICANT CHANGE UP (ref 28.5–37)
AST SERPL-CCNC: 65 U/L — HIGH (ref 15–37)
BASE EXCESS BLDA CALC-SCNC: -1.9 MMOL/L — SIGNIFICANT CHANGE UP (ref -2–2)
BASOPHILS # BLD AUTO: 0.07 K/UL — SIGNIFICANT CHANGE UP (ref 0–0.2)
BASOPHILS NFR BLD AUTO: 0.3 % — SIGNIFICANT CHANGE UP (ref 0–2)
BILIRUB SERPL-MCNC: 0.6 MG/DL — SIGNIFICANT CHANGE UP (ref 0.2–1.2)
BLOOD GAS COMMENTS: SIGNIFICANT CHANGE UP
BLOOD GAS COMMENTS: SIGNIFICANT CHANGE UP
BLOOD GAS SOURCE: SIGNIFICANT CHANGE UP
BUN SERPL-MCNC: 41 MG/DL — HIGH (ref 7–23)
CALCIUM SERPL-MCNC: 8.8 MG/DL — SIGNIFICANT CHANGE UP (ref 8.5–10.1)
CHLORIDE SERPL-SCNC: 104 MMOL/L — SIGNIFICANT CHANGE UP (ref 96–108)
CK MB BLD-MCNC: 2.8 % — SIGNIFICANT CHANGE UP (ref 0–3.5)
CK MB BLD-MCNC: 4.8 % — HIGH (ref 0–3.5)
CK MB CFR SERPL CALC: 2.6 NG/ML — SIGNIFICANT CHANGE UP (ref 0.5–3.6)
CK MB CFR SERPL CALC: 4.5 NG/ML — HIGH (ref 0.5–3.6)
CK MB CFR SERPL CALC: 4.9 NG/ML — HIGH (ref 0.5–3.6)
CK SERPL-CCNC: 102 U/L — SIGNIFICANT CHANGE UP (ref 26–308)
CK SERPL-CCNC: 94 U/L — SIGNIFICANT CHANGE UP (ref 26–308)
CO2 SERPL-SCNC: 27 MMOL/L — SIGNIFICANT CHANGE UP (ref 22–31)
CREAT SERPL-MCNC: 1.91 MG/DL — HIGH (ref 0.5–1.3)
EOSINOPHIL # BLD AUTO: 0.34 K/UL — SIGNIFICANT CHANGE UP (ref 0–0.5)
EOSINOPHIL NFR BLD AUTO: 1.6 % — SIGNIFICANT CHANGE UP (ref 0–6)
GLUCOSE BLDC GLUCOMTR-MCNC: 162 MG/DL — HIGH (ref 70–99)
GLUCOSE BLDC GLUCOMTR-MCNC: 218 MG/DL — HIGH (ref 70–99)
GLUCOSE BLDC GLUCOMTR-MCNC: 320 MG/DL — HIGH (ref 70–99)
GLUCOSE SERPL-MCNC: 306 MG/DL — HIGH (ref 70–99)
HCO3 BLDA-SCNC: 24 MMOL/L — SIGNIFICANT CHANGE UP (ref 21–29)
HCT VFR BLD CALC: 40.7 % — SIGNIFICANT CHANGE UP (ref 39–50)
HGB BLD-MCNC: 13.1 G/DL — SIGNIFICANT CHANGE UP (ref 13–17)
HOROWITZ INDEX BLDA+IHG-RTO: 100 — SIGNIFICANT CHANGE UP
IMM GRANULOCYTES NFR BLD AUTO: 1.2 % — SIGNIFICANT CHANGE UP (ref 0–1.5)
INR BLD: 2.15 RATIO — HIGH (ref 0.88–1.16)
LACTATE SERPL-SCNC: 1.6 MMOL/L — SIGNIFICANT CHANGE UP (ref 0.7–2)
LACTATE SERPL-SCNC: 2.5 MMOL/L — HIGH (ref 0.7–2)
LYMPHOCYTES # BLD AUTO: 2.02 K/UL — SIGNIFICANT CHANGE UP (ref 1–3.3)
LYMPHOCYTES # BLD AUTO: 9.6 % — LOW (ref 13–44)
MCHC RBC-ENTMCNC: 31.8 PG — SIGNIFICANT CHANGE UP (ref 27–34)
MCHC RBC-ENTMCNC: 32.2 GM/DL — SIGNIFICANT CHANGE UP (ref 32–36)
MCV RBC AUTO: 98.8 FL — SIGNIFICANT CHANGE UP (ref 80–100)
MONOCYTES # BLD AUTO: 1.04 K/UL — HIGH (ref 0–0.9)
MONOCYTES NFR BLD AUTO: 5 % — SIGNIFICANT CHANGE UP (ref 2–14)
NEUTROPHILS # BLD AUTO: 17.22 K/UL — HIGH (ref 1.8–7.4)
NEUTROPHILS NFR BLD AUTO: 82.3 % — HIGH (ref 43–77)
NRBC # BLD: 0 /100 WBCS — SIGNIFICANT CHANGE UP (ref 0–0)
NT-PROBNP SERPL-SCNC: 3520 PG/ML — HIGH (ref 0–450)
NT-PROBNP SERPL-SCNC: 3977 PG/ML — HIGH (ref 0–450)
PCO2 BLDA: 49 MMHG — HIGH (ref 32–46)
PH BLD: 7.31 — LOW (ref 7.35–7.45)
PLATELET # BLD AUTO: 182 K/UL — SIGNIFICANT CHANGE UP (ref 150–400)
PO2 BLDA: 240 MMHG — HIGH (ref 74–108)
POTASSIUM SERPL-MCNC: 4.1 MMOL/L — SIGNIFICANT CHANGE UP (ref 3.5–5.3)
POTASSIUM SERPL-SCNC: 4.1 MMOL/L — SIGNIFICANT CHANGE UP (ref 3.5–5.3)
PROT SERPL-MCNC: 7.9 GM/DL — SIGNIFICANT CHANGE UP (ref 6–8.3)
PROTHROM AB SERPL-ACNC: 24.7 SEC — HIGH (ref 10–12.9)
RBC # BLD: 4.12 M/UL — LOW (ref 4.2–5.8)
RBC # FLD: 14.6 % — HIGH (ref 10.3–14.5)
SAO2 % BLDA: 99 % — HIGH (ref 92–96)
SODIUM SERPL-SCNC: 141 MMOL/L — SIGNIFICANT CHANGE UP (ref 135–145)
TROPONIN I SERPL-MCNC: 0.17 NG/ML — HIGH (ref 0.01–0.04)
TROPONIN I SERPL-MCNC: 0.41 NG/ML — HIGH (ref 0.01–0.04)
TROPONIN I SERPL-MCNC: 0.47 NG/ML — HIGH (ref 0.01–0.04)
WBC # BLD: 20.94 K/UL — HIGH (ref 3.8–10.5)
WBC # FLD AUTO: 20.94 K/UL — HIGH (ref 3.8–10.5)

## 2019-12-08 PROCEDURE — 99291 CRITICAL CARE FIRST HOUR: CPT

## 2019-12-08 PROCEDURE — 93010 ELECTROCARDIOGRAM REPORT: CPT

## 2019-12-08 PROCEDURE — 71045 X-RAY EXAM CHEST 1 VIEW: CPT | Mod: 26

## 2019-12-08 RX ORDER — SIMVASTATIN 20 MG/1
40 TABLET, FILM COATED ORAL AT BEDTIME
Refills: 0 | Status: DISCONTINUED | OUTPATIENT
Start: 2019-12-08 | End: 2019-12-11

## 2019-12-08 RX ORDER — DEXTROSE 50 % IN WATER 50 %
25 SYRINGE (ML) INTRAVENOUS ONCE
Refills: 0 | Status: DISCONTINUED | OUTPATIENT
Start: 2019-12-08 | End: 2019-12-11

## 2019-12-08 RX ORDER — TAMSULOSIN HYDROCHLORIDE 0.4 MG/1
0.4 CAPSULE ORAL AT BEDTIME
Refills: 0 | Status: DISCONTINUED | OUTPATIENT
Start: 2019-12-08 | End: 2019-12-11

## 2019-12-08 RX ORDER — ALLOPURINOL 300 MG
200 TABLET ORAL DAILY
Refills: 0 | Status: DISCONTINUED | OUTPATIENT
Start: 2019-12-08 | End: 2019-12-08

## 2019-12-08 RX ORDER — FUROSEMIDE 40 MG
60 TABLET ORAL ONCE
Refills: 0 | Status: COMPLETED | OUTPATIENT
Start: 2019-12-08 | End: 2019-12-08

## 2019-12-08 RX ORDER — CHOLECALCIFEROL (VITAMIN D3) 125 MCG
1000 CAPSULE ORAL DAILY
Refills: 0 | Status: DISCONTINUED | OUTPATIENT
Start: 2019-12-08 | End: 2019-12-11

## 2019-12-08 RX ORDER — SACUBITRIL AND VALSARTAN 24; 26 MG/1; MG/1
1 TABLET, FILM COATED ORAL
Refills: 0 | Status: DISCONTINUED | OUTPATIENT
Start: 2019-12-08 | End: 2019-12-11

## 2019-12-08 RX ORDER — INSULIN LISPRO 100/ML
4 VIAL (ML) SUBCUTANEOUS ONCE
Refills: 0 | Status: COMPLETED | OUTPATIENT
Start: 2019-12-08 | End: 2019-12-08

## 2019-12-08 RX ORDER — OMEGA-3 ACID ETHYL ESTERS 1 G
300 CAPSULE ORAL
Qty: 0 | Refills: 0 | DISCHARGE

## 2019-12-08 RX ORDER — RIVAROXABAN 15 MG-20MG
15 KIT ORAL
Refills: 0 | Status: DISCONTINUED | OUTPATIENT
Start: 2019-12-08 | End: 2019-12-11

## 2019-12-08 RX ORDER — FOLIC ACID 0.8 MG
1 TABLET ORAL DAILY
Refills: 0 | Status: DISCONTINUED | OUTPATIENT
Start: 2019-12-08 | End: 2019-12-11

## 2019-12-08 RX ORDER — TIOTROPIUM BROMIDE 18 UG/1
1 CAPSULE ORAL; RESPIRATORY (INHALATION) DAILY
Refills: 0 | Status: DISCONTINUED | OUTPATIENT
Start: 2019-12-08 | End: 2019-12-11

## 2019-12-08 RX ORDER — SODIUM CHLORIDE 9 MG/ML
1000 INJECTION, SOLUTION INTRAVENOUS
Refills: 0 | Status: DISCONTINUED | OUTPATIENT
Start: 2019-12-08 | End: 2019-12-11

## 2019-12-08 RX ORDER — PIPERACILLIN AND TAZOBACTAM 4; .5 G/20ML; G/20ML
3.38 INJECTION, POWDER, LYOPHILIZED, FOR SOLUTION INTRAVENOUS ONCE
Refills: 0 | Status: COMPLETED | OUTPATIENT
Start: 2019-12-08 | End: 2019-12-08

## 2019-12-08 RX ORDER — ASPIRIN/CALCIUM CARB/MAGNESIUM 324 MG
81 TABLET ORAL DAILY
Refills: 0 | Status: DISCONTINUED | OUTPATIENT
Start: 2019-12-09 | End: 2019-12-11

## 2019-12-08 RX ORDER — ASPIRIN/CALCIUM CARB/MAGNESIUM 324 MG
162 TABLET ORAL ONCE
Refills: 0 | Status: COMPLETED | OUTPATIENT
Start: 2019-12-08 | End: 2019-12-08

## 2019-12-08 RX ORDER — CHOLECALCIFEROL (VITAMIN D3) 125 MCG
1000 CAPSULE ORAL
Qty: 0 | Refills: 0 | DISCHARGE

## 2019-12-08 RX ORDER — GLUCAGON INJECTION, SOLUTION 0.5 MG/.1ML
1 INJECTION, SOLUTION SUBCUTANEOUS ONCE
Refills: 0 | Status: DISCONTINUED | OUTPATIENT
Start: 2019-12-08 | End: 2019-12-11

## 2019-12-08 RX ORDER — FUROSEMIDE 40 MG
40 TABLET ORAL DAILY
Refills: 0 | Status: DISCONTINUED | OUTPATIENT
Start: 2019-12-08 | End: 2019-12-11

## 2019-12-08 RX ORDER — DEXTROSE 50 % IN WATER 50 %
12.5 SYRINGE (ML) INTRAVENOUS ONCE
Refills: 0 | Status: DISCONTINUED | OUTPATIENT
Start: 2019-12-08 | End: 2019-12-11

## 2019-12-08 RX ORDER — RIVAROXABAN 15 MG-20MG
20 KIT ORAL DAILY
Refills: 0 | Status: DISCONTINUED | OUTPATIENT
Start: 2019-12-08 | End: 2019-12-08

## 2019-12-08 RX ORDER — SODIUM CHLORIDE 9 MG/ML
1000 INJECTION INTRAMUSCULAR; INTRAVENOUS; SUBCUTANEOUS ONCE
Refills: 0 | Status: COMPLETED | OUTPATIENT
Start: 2019-12-08 | End: 2019-12-08

## 2019-12-08 RX ORDER — SENNA PLUS 8.6 MG/1
2 TABLET ORAL AT BEDTIME
Refills: 0 | Status: DISCONTINUED | OUTPATIENT
Start: 2019-12-08 | End: 2019-12-11

## 2019-12-08 RX ORDER — DEXTROSE 50 % IN WATER 50 %
15 SYRINGE (ML) INTRAVENOUS ONCE
Refills: 0 | Status: DISCONTINUED | OUTPATIENT
Start: 2019-12-08 | End: 2019-12-11

## 2019-12-08 RX ORDER — ALLOPURINOL 300 MG
100 TABLET ORAL DAILY
Refills: 0 | Status: DISCONTINUED | OUTPATIENT
Start: 2019-12-08 | End: 2019-12-11

## 2019-12-08 RX ORDER — INSULIN LISPRO 100/ML
VIAL (ML) SUBCUTANEOUS
Refills: 0 | Status: DISCONTINUED | OUTPATIENT
Start: 2019-12-08 | End: 2019-12-11

## 2019-12-08 RX ORDER — ASPIRIN/CALCIUM CARB/MAGNESIUM 324 MG
162 TABLET ORAL ONCE
Refills: 0 | Status: DISCONTINUED | OUTPATIENT
Start: 2019-12-08 | End: 2019-12-08

## 2019-12-08 RX ORDER — HEPARIN SODIUM 5000 [USP'U]/ML
5000 INJECTION INTRAVENOUS; SUBCUTANEOUS EVERY 12 HOURS
Refills: 0 | Status: DISCONTINUED | OUTPATIENT
Start: 2019-12-08 | End: 2019-12-08

## 2019-12-08 RX ADMIN — RIVAROXABAN 15 MILLIGRAM(S): KIT at 18:01

## 2019-12-08 RX ADMIN — SIMVASTATIN 40 MILLIGRAM(S): 20 TABLET, FILM COATED ORAL at 22:05

## 2019-12-08 RX ADMIN — Medication 1 MILLIGRAM(S): at 12:10

## 2019-12-08 RX ADMIN — SACUBITRIL AND VALSARTAN 1 TABLET(S): 24; 26 TABLET, FILM COATED ORAL at 18:01

## 2019-12-08 RX ADMIN — TAMSULOSIN HYDROCHLORIDE 0.4 MILLIGRAM(S): 0.4 CAPSULE ORAL at 22:04

## 2019-12-08 RX ADMIN — SODIUM CHLORIDE 1000 MILLILITER(S): 9 INJECTION INTRAMUSCULAR; INTRAVENOUS; SUBCUTANEOUS at 09:19

## 2019-12-08 RX ADMIN — Medication 4 UNIT(S): at 13:54

## 2019-12-08 RX ADMIN — Medication 100 MILLIGRAM(S): at 12:10

## 2019-12-08 RX ADMIN — PIPERACILLIN AND TAZOBACTAM 200 GRAM(S): 4; .5 INJECTION, POWDER, LYOPHILIZED, FOR SOLUTION INTRAVENOUS at 09:19

## 2019-12-08 RX ADMIN — Medication 2: at 16:43

## 2019-12-08 RX ADMIN — Medication 40 MILLIGRAM(S): at 13:55

## 2019-12-08 RX ADMIN — SENNA PLUS 2 TABLET(S): 8.6 TABLET ORAL at 22:03

## 2019-12-08 RX ADMIN — Medication 60 MILLIGRAM(S): at 08:20

## 2019-12-08 RX ADMIN — Medication 162 MILLIGRAM(S): at 08:21

## 2019-12-08 NOTE — ED PROVIDER NOTE - OBJECTIVE STATEMENT
74 years old male by ems with cpap c/o respiratory distress. EMS sts pt's blood pressure was very high, received nitro spray x 4 aspirin 162 mg. Pt is alert and oriented but unable to speaking in clear full sentences unable to give detail hx, EMS sts pt has hx of chf, hypertension and cardiac pacemaker placement and copd and sts pt has never being et intubated. Pt sts he quit smoking 20 years ago.

## 2019-12-08 NOTE — ED ADULT NURSE NOTE - PSH
S/P CABG x 3  cabg3  in 2003  S/P cardiac cath  cardiac stentin 2011  S/P cardiac pacemaker procedure  defibrilator 2012  S/P hernia repair  hernia [id number]

## 2019-12-08 NOTE — ED ADULT NURSE NOTE - NSIMPLEMENTINTERV_GEN_ALL_ED
Implemented All Fall Risk Interventions:  [place] to call system. Call bell, personal items and telephone within reach. Instruct patient to call for assistance. Room bathroom lighting operational. Non-slip footwear when patient is off stretcher. Physically safe environment: no spills, clutter or unnecessary equipment. Stretcher in lowest position, wheels locked, appropriate side rails in place. Provide visual cue, wrist band, yellow gown, etc. Monitor gait and stability. Monitor for mental status changes and reorient to person, place, and time. Review medications for side effects contributing to fall risk. Reinforce activity limits and safety measures with patient and family.

## 2019-12-08 NOTE — ED ADULT NURSE NOTE - OBJECTIVE STATEMENT
Patient received A&O x3, bilateral rales to both lung, patient placed on bipap. place on cardiac monitor. 20 gauge to R/ hand inserted b

## 2019-12-08 NOTE — ED PROVIDER NOTE - PSH
S/P CABG x 3  cabg3  in 2003  S/P cardiac cath  cardiac stentin 2011  S/P cardiac pacemaker procedure  defibrilator 2012  S/P hernia repair  hernia [id number]

## 2019-12-08 NOTE — ED PROVIDER NOTE - CONSTITUTIONAL, MLM
normal... Well appearing, awake, alert, oriented to person, place, time/situation and in no apparent distress. + nasal flaring + shoulders retractions

## 2019-12-08 NOTE — H&P ADULT - NSICDXPASTMEDICALHX_GEN_ALL_CORE_FT
PAST MEDICAL HISTORY:  CHF (congestive heart failure)     COPD (chronic obstructive pulmonary disease)     DM (diabetes mellitus), type 2     High cholesterol     HTN (hypertension)     Pneumonia

## 2019-12-08 NOTE — H&P ADULT - ASSESSMENT
74 years old male by ems with cpap c/o respiratory distress. EMS sts pt's blood pressure was very high, received nitro spray x 4 aspirin 162 mg. Pt is alert and oriented but unable to speaking in clear full sentences unable to give detail hx

## 2019-12-08 NOTE — CONSULT NOTE ADULT - SUBJECTIVE AND OBJECTIVE BOX
Patient is a 74y old  Male who presents with a chief complaint of sob (08 Dec 2019 11:01)      HPI:  74 years old male by ems with cpap c/o respiratory distress. EMS states BP was elevated and pt markedly SOB. Has hx of COPD, CHF, EMBER, s/p 3 vessel CABG, cardiac stents as well as pacemaker/AICD. Has DM ,HLD, and smoked heavily      PAST MEDICAL & SURGICAL HISTORY:  COPD (chronic obstructive pulmonary disease)  Pneumonia  CHF (congestive heart failure)  High cholesterol  DM (diabetes mellitus), type 2  HTN (hypertension)  S/P hernia repair: hernia [id number]  S/P cardiac cath: cardiac stentin 2011  S/P cardiac pacemaker procedure: defibrilator 2012  S/P CABG x 3: cabg3  in 2003      FAMILY HISTORY:  No pertinent family history in first degree relatives    SOCIAL HISTORY: BMI (kg/m2): 34.2 (12-08 @ 13:38)  was heavy smoker  Allergies  No Known Allergies          MEDICATIONS  (STANDING):  allopurinol 100 milliGRAM(s) Oral daily  aspirin enteric coated 81 milliGRAM(s) Oral daily  cholecalciferol 1000 Unit(s) Oral daily  dextrose 5%. 1000 milliLiter(s) (50 mL/Hr) IV Continuous <Continuous>  dextrose 50% Injectable 12.5 Gram(s) IV Push once  dextrose 50% Injectable 25 Gram(s) IV Push once  dextrose 50% Injectable 25 Gram(s) IV Push once  folic acid 1 milliGRAM(s) Oral daily  furosemide   Injectable 40 milliGRAM(s) IV Push daily  insulin lispro (HumaLOG) corrective regimen sliding scale   SubCutaneous three times a day before meals  rivaroxaban 15 milliGRAM(s) Oral with dinner  sacubitril 24 mG/valsartan 26 mG 1 Tablet(s) Oral two times a day  senna 2 Tablet(s) Oral at bedtime  simvastatin 40 milliGRAM(s) Oral at bedtime  tamsulosin 0.4 milliGRAM(s) Oral at bedtime    MEDICATIONS  (PRN):  dextrose 40% Gel 15 Gram(s) Oral once PRN Blood Glucose LESS THAN 70 milliGRAM(s)/deciliter  glucagon  Injectable 1 milliGRAM(s) IntraMuscular once PRN Glucose LESS THAN 70 milligrams/deciliter    REVIEW OF SYSTEMS:    Constitutional:            No fever, weight loss or fatigue  HEENT:                         No difficulty hearing, tinnitus, vertigo; No sinus or throat pain  Respiratory:                SOB  Cardiovascular:            chest pain, palpitations  Gastrointestinal:        No abdominal or epigastric pain. No N/V/diarrhea or hematemesis  Genitourinary:            No dysuria, frequency, hematuria or incontinence  SKIN:                             no rash  Musculoskeletal:        No joint pain or swelling  Extremities:                No swelling  Neurological:              No headaches  Psychiatric:                 No depression, anxiety    PQRS:  Vaccines - Influenza and Pneumovax:  BMI:  Tobacco:  Depression:   Colorectal Screening:  Breast Cancer Screening:  Blood Presssure Screening / Control of:  HbAIc:  Ischemic Vascular Disease:  Current Medications Reviewed:    Vital Signs Last 24 Hrs  T(C): 36.1 (08 Dec 2019 16:51), Max: 37 (08 Dec 2019 08:05)  T(F): 97 (08 Dec 2019 16:51), Max: 98.6 (08 Dec 2019 08:05)  HR: 69 (08 Dec 2019 16:55) (60 - 69)  BP: 134/67 (08 Dec 2019 16:51) (134/67 - 162/77)  BP(mean): --  RR: 18 (08 Dec 2019 16:51) (18 - 33)  SpO2: 98% (08 Dec 2019 16:55) (96% - 100%)    PHYSICAL EXAM:  GEN:         Awake, responsive and comfortable.  HEENT:    Normal.    RESP:          bilat rhonchi and basilar crackles  CVS:             Regular rate and rhythm.   ABD:         Soft, non-tender, non-distended;   :             No costovertebral angle tenderness  SKIN:           Warm and dry.  EXTR:            No clubbing, cyanosis or edema  CNS:              Intact sensory and motor function.  PSYCH:        cooperative, no anxiety or depression            LABS:                        13.1   20.94 )-----------( 182      ( 08 Dec 2019 08:20 )             40.7     12-08    141  |  104  |  41<H>  ----------------------------<  306<H>  4.1   |  27  |  1.91<H>    Ca    8.8      08 Dec 2019 09:13    TPro  7.9  /  Alb  3.9  /  TBili  0.6  /  DBili  x   /  AST  65<H>  /  ALT  62  /  AlkPhos  130<H>  12-08    PT/INR - ( 08 Dec 2019 08:20 )   PT: 24.7 sec;   INR: 2.15 ratio         PTT - ( 08 Dec 2019 08:20 )  PTT:30.2 sec            EKG:     RADIOLOGY & ADDITIONAL STUDIES:  < from: Xray Chest 1 View-PORTABLE IMMEDIATE (12.08.19 @ 09:47) >    EXAM:  XR CHEST PORTABLE IMMED 1V                            PROCEDURE DATE:  12/08/2019          INTERPRETATION:  XR CHEST IMMEDIATE    History: Shortness of breath    Technique: Single AP view of the chest.    Comparison: Chest x-ray 1/19/2018.    Findings:    Lungs/Pleura:  Bilateral patchy lung opacities, greater in the lower   lungs. Linear atelectasis or scarring at the right lung base.    Heart/Mediastinum:  Cardiomegaly.    Bones/soft tissues:  Sternotomy. Left chest wall cardiac devicewith   leads in place.    Impression:    Patchy bilateral lung opacities, greater in the lower lungs, likely   pulmonary edema.    Cardiomegaly.    Linear atelectasis or scarring at the right lung base.                HUGO ARMSTRONG   This document has been electronically signed. Dec  8 2019 10:48AM    < end of copied text >    ASSESSMENT: CHF, COPD, EMBER, DM, CAD, HLD, HTN, Hypercapnia  PLAN: Bipap 14/6 fio2 40% for sleep and PRN, nasal cannnula 4l/min. Duoneb at q6h. diuretics in progress. cxr in am. Patient is a 74y old  Male who presents with a chief complaint of sob (08 Dec 2019 11:01)      HPI:  74 years old male by ems with cpap c/o respiratory distress. EMS states BP was elevated and pt markedly SOB. Has hx of COPD, CHF, EMBER, s/p 3 vessel CABG, cardiac stents as well as pacemaker/AICD. Has DM ,HLD, and smoked heavily      PAST MEDICAL & SURGICAL HISTORY:  COPD (chronic obstructive pulmonary disease)  Pneumonia  CHF (congestive heart failure)  High cholesterol  DM (diabetes mellitus), type 2  HTN (hypertension)  S/P hernia repair: hernia [id number]  S/P cardiac cath: cardiac stentin 2011  S/P cardiac pacemaker procedure: defibrilator 2012  S/P CABG x 3: cabg3  in 2003      FAMILY HISTORY:  No pertinent family history in first degree relatives    SOCIAL HISTORY: BMI (kg/m2): 34.2 (12-08 @ 13:38)  was heavy smoker  Allergies  No Known Allergies          MEDICATIONS  (STANDING):  allopurinol 100 milliGRAM(s) Oral daily  aspirin enteric coated 81 milliGRAM(s) Oral daily  cholecalciferol 1000 Unit(s) Oral daily  dextrose 5%. 1000 milliLiter(s) (50 mL/Hr) IV Continuous <Continuous>  dextrose 50% Injectable 12.5 Gram(s) IV Push once  dextrose 50% Injectable 25 Gram(s) IV Push once  dextrose 50% Injectable 25 Gram(s) IV Push once  folic acid 1 milliGRAM(s) Oral daily  furosemide   Injectable 40 milliGRAM(s) IV Push daily  insulin lispro (HumaLOG) corrective regimen sliding scale   SubCutaneous three times a day before meals  rivaroxaban 15 milliGRAM(s) Oral with dinner  sacubitril 24 mG/valsartan 26 mG 1 Tablet(s) Oral two times a day  senna 2 Tablet(s) Oral at bedtime  simvastatin 40 milliGRAM(s) Oral at bedtime  tamsulosin 0.4 milliGRAM(s) Oral at bedtime    MEDICATIONS  (PRN):  dextrose 40% Gel 15 Gram(s) Oral once PRN Blood Glucose LESS THAN 70 milliGRAM(s)/deciliter  glucagon  Injectable 1 milliGRAM(s) IntraMuscular once PRN Glucose LESS THAN 70 milligrams/deciliter    REVIEW OF SYSTEMS:    Constitutional:            No fever, weight loss or fatigue  HEENT:                         No difficulty hearing, tinnitus, vertigo; No sinus or throat pain  Respiratory:                SOB  Cardiovascular:            chest pain, palpitations  Gastrointestinal:        No abdominal or epigastric pain. No N/V/diarrhea or hematemesis  Genitourinary:            No dysuria, frequency, hematuria or incontinence  SKIN:                             no rash  Musculoskeletal:        No joint pain or swelling  Extremities:                No swelling  Neurological:              No headaches  Psychiatric:                 No depression, anxiety    PQRS:  Vaccines - Influenza and Pneumovax:  BMI:  Tobacco:  Depression:   Colorectal Screening:  Breast Cancer Screening:  Blood Presssure Screening / Control of:  HbAIc:  Ischemic Vascular Disease:  Current Medications Reviewed:    Vital Signs Last 24 Hrs  T(C): 36.1 (08 Dec 2019 16:51), Max: 37 (08 Dec 2019 08:05)  T(F): 97 (08 Dec 2019 16:51), Max: 98.6 (08 Dec 2019 08:05)  HR: 69 (08 Dec 2019 16:55) (60 - 69)  BP: 134/67 (08 Dec 2019 16:51) (134/67 - 162/77)  BP(mean): --  RR: 18 (08 Dec 2019 16:51) (18 - 33)  SpO2: 98% (08 Dec 2019 16:55) (96% - 100%)    PHYSICAL EXAM:  GEN:         Awake, responsive and comfortable.  HEENT:    Normal.    RESP:          bilat rhonchi and basilar crackles  CVS:             Regular rate and rhythm.   ABD:         Soft, non-tender, non-distended;   :             No costovertebral angle tenderness  SKIN:           Warm and dry.  EXTR:            No clubbing, cyanosis or edema  CNS:              Intact sensory and motor function.  PSYCH:        cooperative, no anxiety or depression            LABS:                        13.1   20.94 )-----------( 182      ( 08 Dec 2019 08:20 )             40.7     12-08    141  |  104  |  41<H>  ----------------------------<  306<H>  4.1   |  27  |  1.91<H>    Ca    8.8      08 Dec 2019 09:13    TPro  7.9  /  Alb  3.9  /  TBili  0.6  /  DBili  x   /  AST  65<H>  /  ALT  62  /  AlkPhos  130<H>  12-08    PT/INR - ( 08 Dec 2019 08:20 )   PT: 24.7 sec;   INR: 2.15 ratio         PTT - ( 08 Dec 2019 08:20 )  PTT:30.2 sec            EKG:     RADIOLOGY & ADDITIONAL STUDIES:  < from: Xray Chest 1 View-PORTABLE IMMEDIATE (12.08.19 @ 09:47) >    EXAM:  XR CHEST PORTABLE IMMED 1V                            PROCEDURE DATE:  12/08/2019          INTERPRETATION:  XR CHEST IMMEDIATE    History: Shortness of breath    Technique: Single AP view of the chest.    Comparison: Chest x-ray 1/19/2018.    Findings:    Lungs/Pleura:  Bilateral patchy lung opacities, greater in the lower   lungs. Linear atelectasis or scarring at the right lung base.    Heart/Mediastinum:  Cardiomegaly.    Bones/soft tissues:  Sternotomy. Left chest wall cardiac devicewith   leads in place.    Impression:    Patchy bilateral lung opacities, greater in the lower lungs, likely   pulmonary edema.    Cardiomegaly.    Linear atelectasis or scarring at the right lung base.                HUGO ARMSTRONG   This document has been electronically signed. Dec  8 2019 10:48AM    < end of copied text >    ASSESSMENT: CHF, COPD, EMBER, DM, CAD, HLD, HTN, Hypercapnia, Elevated cardiac enzymes  PLAN: Bipap 14/6 fio2 40% for sleep and PRN, nasal cannnula 4l/min. Duoneb at q6h. diuretics in progress. cxr in am. cardio evaluation. HPI:  74 years old male by ems with cpap c/o respiratory distress. EMS states BP was elevated and pt markedly SOB. Has hx of COPD, CHF, EMBER, s/p 3 vessel CABG, cardiac stents as well as pacemaker/AICD. Has DM ,HLD, and smoked heavily      PAST MEDICAL & SURGICAL HISTORY:  COPD (chronic obstructive pulmonary disease)  Pneumonia  CHF (congestive heart failure)  High cholesterol  DM (diabetes mellitus), type 2  HTN (hypertension)  S/P hernia repair: hernia [id number]  S/P cardiac cath: cardiac stentin 2011  S/P cardiac pacemaker procedure: defibrilator 2012  S/P CABG x 3: cabg3  in 2003      FAMILY HISTORY:  No pertinent family history in first degree relatives    SOCIAL HISTORY: BMI (kg/m2): 34.2 (12-08 @ 13:38)  was heavy smoker  Allergies  No Known Allergies          MEDICATIONS  (STANDING):  allopurinol 100 milliGRAM(s) Oral daily  aspirin enteric coated 81 milliGRAM(s) Oral daily  cholecalciferol 1000 Unit(s) Oral daily  dextrose 5%. 1000 milliLiter(s) (50 mL/Hr) IV Continuous <Continuous>  dextrose 50% Injectable 12.5 Gram(s) IV Push once  dextrose 50% Injectable 25 Gram(s) IV Push once  dextrose 50% Injectable 25 Gram(s) IV Push once  folic acid 1 milliGRAM(s) Oral daily  furosemide   Injectable 40 milliGRAM(s) IV Push daily  insulin lispro (HumaLOG) corrective regimen sliding scale   SubCutaneous three times a day before meals  rivaroxaban 15 milliGRAM(s) Oral with dinner  sacubitril 24 mG/valsartan 26 mG 1 Tablet(s) Oral two times a day  senna 2 Tablet(s) Oral at bedtime  simvastatin 40 milliGRAM(s) Oral at bedtime  tamsulosin 0.4 milliGRAM(s) Oral at bedtime    MEDICATIONS  (PRN):  dextrose 40% Gel 15 Gram(s) Oral once PRN Blood Glucose LESS THAN 70 milliGRAM(s)/deciliter  glucagon  Injectable 1 milliGRAM(s) IntraMuscular once PRN Glucose LESS THAN 70 milligrams/deciliter    REVIEW OF SYSTEMS:    Constitutional:            No fever, weight loss or fatigue  HEENT:                         No difficulty hearing, tinnitus, vertigo; No sinus or throat pain  Respiratory:                SOB  Cardiovascular:            chest pain, palpitations  Gastrointestinal:        No abdominal or epigastric pain. No N/V/diarrhea or hematemesis  Genitourinary:            No dysuria, frequency, hematuria or incontinence  SKIN:                             no rash  Musculoskeletal:        No joint pain or swelling  Extremities:                No swelling  Neurological:              No headaches  Psychiatric:                 No depression, anxiety    PQRS:  Vaccines - Influenza and Pneumovax:  BMI:  Tobacco:  Depression:   Colorectal Screening:  Breast Cancer Screening:  Blood Presssure Screening / Control of:  HbAIc:  Ischemic Vascular Disease:  Current Medications Reviewed:    Vital Signs Last 24 Hrs  T(C): 36.1 (08 Dec 2019 16:51), Max: 37 (08 Dec 2019 08:05)  T(F): 97 (08 Dec 2019 16:51), Max: 98.6 (08 Dec 2019 08:05)  HR: 69 (08 Dec 2019 16:55) (60 - 69)  BP: 134/67 (08 Dec 2019 16:51) (134/67 - 162/77)  BP(mean): --  RR: 18 (08 Dec 2019 16:51) (18 - 33)  SpO2: 98% (08 Dec 2019 16:55) (96% - 100%)    PHYSICAL EXAM:  GEN:         Awake, responsive and comfortable.  HEENT:    Normal.    RESP:          bilat rhonchi and basilar crackles  CVS:             Regular rate and rhythm.   ABD:         Soft, non-tender, non-distended;   :             No costovertebral angle tenderness  SKIN:           Warm and dry.  EXTR:            No clubbing, cyanosis or edema  CNS:              Intact sensory and motor function.  PSYCH:        cooperative, no anxiety or depression            LABS:                        13.1   20.94 )-----------( 182      ( 08 Dec 2019 08:20 )             40.7     12-08    141  |  104  |  41<H>  ----------------------------<  306<H>  4.1   |  27  |  1.91<H>    Ca    8.8      08 Dec 2019 09:13    TPro  7.9  /  Alb  3.9  /  TBili  0.6  /  DBili  x   /  AST  65<H>  /  ALT  62  /  AlkPhos  130<H>  12-08    PT/INR - ( 08 Dec 2019 08:20 )   PT: 24.7 sec;   INR: 2.15 ratio         PTT - ( 08 Dec 2019 08:20 )  PTT:30.2 sec            EKG:     RADIOLOGY & ADDITIONAL STUDIES:  < from: Xray Chest 1 View-PORTABLE IMMEDIATE (12.08.19 @ 09:47) >    EXAM:  XR CHEST PORTABLE IMMED 1V                            PROCEDURE DATE:  12/08/2019          INTERPRETATION:  XR CHEST IMMEDIATE    History: Shortness of breath    Technique: Single AP view of the chest.    Comparison: Chest x-ray 1/19/2018.    Findings:    Lungs/Pleura:  Bilateral patchy lung opacities, greater in the lower   lungs. Linear atelectasis or scarring at the right lung base.    Heart/Mediastinum:  Cardiomegaly.    Bones/soft tissues:  Sternotomy. Left chest wall cardiac devicewith   leads in place.    Impression:    Patchy bilateral lung opacities, greater in the lower lungs, likely   pulmonary edema.    Cardiomegaly.    Linear atelectasis or scarring at the right lung base.                HUGO ARMSTRONG   This document has been electronically signed. Dec  8 2019 10:48AM    < end of copied text >    ASSESSMENT: CHF, COPD, EMBER, DM, CAD, HLD, HTN, Hypercapnia, Elevated cardiac enzymes  PLAN: Bipap 14/6 fio2 40% for sleep and PRN, nasal cannnula 4l/min. spiriva 1 cap daily. diuretics in progress. cxr in am. cardio evaluation.

## 2019-12-08 NOTE — H&P ADULT - NSHPLABSRESULTS_GEN_ALL_CORE
13.1   20.94 )-----------( 182      ( 08 Dec 2019 08:20 )             40.7     12-08    141  |  104  |  41<H>  ----------------------------<  306<H>  4.1   |  27  |  1.91<H>    Ca    8.8      08 Dec 2019 09:13    TPro  7.9  /  Alb  3.9  /  TBili  0.6  /  DBili  x   /  AST  65<H>  /  ALT  62  /  AlkPhos  130<H>  12-08    PT/INR - ( 08 Dec 2019 08:20 )   PT: 24.7 sec;   INR: 2.15 ratio         PTT - ( 08 Dec 2019 08:20 )  PTT:30.2 sec

## 2019-12-08 NOTE — ED ADULT NURSE NOTE - CHIEF COMPLAINT QUOTE
patient BIBA in respiratory distress , patient on C pap in arrival , patient A&Ox3 , denied chest pain a the time of triage

## 2019-12-08 NOTE — H&P ADULT - NSHPREVIEWOFSYSTEMS_GEN_ALL_CORE
CONSTITUTIONAL: No fever, weight loss, or fatigue  EYES: No eye pain, visual disturbances, or discharge  ENMT:  No difficulty hearing, tinnitus, vertigo; No sinus or throat pain  NECK: No pain or stiffness  BREASTS: No pain, masses, or nipple discharge  RESPIRATORY: No cough, wheezing, chills or hemoptysis; + shortness of breath  CARDIOVASCULAR: No chest pain, palpitations, dizziness, or leg swelling  GASTROINTESTINAL: No abdominal or epigastric pain. No nausea, vomiting, or hematemesis; No diarrhea or constipation. No melena or hematochezia.  GENITOURINARY: No dysuria, frequency, hematuria, or incontinence  NEUROLOGICAL: No headaches, memory loss, loss of strength, numbness, or tremors  SKIN: No itching, burning, rashes, or lesions   LYMPH NODES: No enlarged glands  ENDOCRINE: No heat or cold intolerance; No hair loss  MUSCULOSKELETAL: No joint pain or swelling; No muscle, back, or extremity pain  PSYCHIATRIC: No depression, anxiety, mood swings, or difficulty sleeping  HEME/LYMPH: No easy bruising, or bleeding gums  ALLERGY AND IMMUNOLOGIC: No hives or eczema

## 2019-12-08 NOTE — H&P ADULT - NSICDXPASTSURGICALHX_GEN_ALL_CORE_FT
PAST SURGICAL HISTORY:  S/P CABG x 3 cabg3  in 2003    S/P cardiac cath cardiac stentin 2011    S/P cardiac pacemaker procedure defibrilator 2012    S/P hernia repair hernia [id number]

## 2019-12-08 NOTE — ED PROVIDER NOTE - CLINICAL SUMMARY MEDICAL DECISION MAKING FREE TEXT BOX
hx, exam, labs, ekg, cxr, reeval hx, exam, labs, ekg, cxr, reeval Pt is more of pulmonary edema not septic by the exam.

## 2019-12-08 NOTE — ED PROVIDER NOTE - CRITICAL CARE PROVIDED
direct patient care (not related to procedure)/consultation with other physicians/interpretation of diagnostic studies/consult w/ pt's family directly relating to pts condition/documentation/additional history taking

## 2019-12-08 NOTE — ED PROVIDER NOTE - PROGRESS NOTE DETAILS
wife is here sts pt has been coughing sob for two days., Pt had defibulator and pacemaker placed last year. Dr. Pereira cardiologist is paged. Dr. Burnett is notified and admit pt. Pt sts he is breathing much better.

## 2019-12-08 NOTE — CONSULT NOTE ADULT - SUBJECTIVE AND OBJECTIVE BOX
HPI:  HPI:  74 years old male by ems with cpap c/o respiratory distress. EMS sts pt's blood pressure was very high, received nitro spray x 4 aspirin 162 mg. Pt is alert and oriented but unable to speaking in clear full sentences unable to give detail hx (08 Dec 2019 11:01)      Chief Complaint:  Patient is a 74y old  Male who presents with a chief complaint of sob (08 Dec 2019 20:14)      Review of Systems:    General:  No wt loss, fevers, chills, night sweats  Eyes:  Good vision, no reported pain  ENT:  No sore throat, pain, runny nose, dysphagia  CV:  No pain, palpitations, hypo/hypertension  Resp:  dyspnea, cough, tachypnea, wheezing  GI:  No pain, nausea, vomiting, diarrhea, constipation         Social History/Family History  SOCHX:   tobacco,  -  alcohol    FMHX: FA/MO  - contributory       Discussed with:  PMD, Family    Physical Exam:    Vital Signs:  Vital Signs Last 24 Hrs  T(C): 36.1 (08 Dec 2019 16:51), Max: 37 (08 Dec 2019 08:05)  T(F): 97 (08 Dec 2019 16:51), Max: 98.6 (08 Dec 2019 08:05)  HR: 69 (08 Dec 2019 16:55) (60 - 69)  BP: 134/67 (08 Dec 2019 16:51) (134/67 - 162/77)  BP(mean): --  RR: 18 (08 Dec 2019 16:51) (18 - 33)  SpO2: 98% (08 Dec 2019 16:55) (96% - 100%)  Daily Height in cm: 177.8 (08 Dec 2019 13:38)    Daily   I&O's Summary    08 Dec 2019 07:01  -  08 Dec 2019 21:24  --------------------------------------------------------  IN: 0 mL / OUT: 600 mL / NET: -600 mL          Chest:  decreased bases BL  Cardiovascular:  Regular rhythm, S1, S2, no murmur/rub/S3/S4, no carotid/femoral/abdominal bruit,   Abdomen:  Soft, non-tender, non-distended, normoactive bowel sounds, no HSM      Laboratory:                          13.1   20.94 )-----------( 182      ( 08 Dec 2019 08:20 )             40.7     12-08    141  |  104  |  41<H>  ----------------------------<  306<H>  4.1   |  27  |  1.91<H>    Ca    8.8      08 Dec 2019 09:13    TPro  7.9  /  Alb  3.9  /  TBili  0.6  /  DBili  x   /  AST  65<H>  /  ALT  62  /  AlkPhos  130<H>  12-08    ABG - ( 08 Dec 2019 08:46 )  pH, Arterial: x     pH, Blood: 7.31  /  pCO2: 49    /  pO2: 240   / HCO3: 24    / Base Excess: -1.9  /  SaO2: 99                CARDIAC MARKERS ( 08 Dec 2019 15:58 )  .468 ng/mL / x     / x     / x     / 4.5 ng/mL  CARDIAC MARKERS ( 08 Dec 2019 09:13 )  .167 ng/mL / x     / 94 U/L / x     / 2.6 ng/mL      CAPILLARY BLOOD GLUCOSE      POCT Blood Glucose.: 162 mg/dL (08 Dec 2019 16:14)  POCT Blood Glucose.: 218 mg/dL (08 Dec 2019 12:01)  POCT Blood Glucose.: 320 mg/dL (08 Dec 2019 08:00)    LIVER FUNCTIONS - ( 08 Dec 2019 09:13 )  Alb: 3.9 g/dL / Pro: 7.9 gm/dL / ALK PHOS: 130 U/L / ALT: 62 U/L / AST: 65 U/L / GGT: x           PT/INR - ( 08 Dec 2019 08:20 )   PT: 24.7 sec;   INR: 2.15 ratio         PTT - ( 08 Dec 2019 08:20 )  PTT:30.2 sec      Assessment:  asked to assess this well-known patient with shortness of breath  The patient has a known significant cardiac history with chronic congestive heart failure and an implantable automatic defibrillator  The patient remains on CPAP after a pulmonary consultation  Pulmonary management continues  Diuresis continues  Previous echoes one being performed in January 2018 revealed similar findings  A repeat diagnostic echocardiogram is ordered  Mildly leaked troponin levels and a renal insufficient patient was not likely to be myocardial infarction, but more likely demand ischemia

## 2019-12-09 DIAGNOSIS — E11.9 TYPE 2 DIABETES MELLITUS WITHOUT COMPLICATIONS: ICD-10-CM

## 2019-12-09 LAB
ANION GAP SERPL CALC-SCNC: 4 MMOL/L — LOW (ref 5–17)
APPEARANCE UR: CLEAR — SIGNIFICANT CHANGE UP
BACTERIA # UR AUTO: ABNORMAL
BILIRUB UR-MCNC: NEGATIVE — SIGNIFICANT CHANGE UP
BUN SERPL-MCNC: 39 MG/DL — HIGH (ref 7–23)
CALCIUM SERPL-MCNC: 8.8 MG/DL — SIGNIFICANT CHANGE UP (ref 8.5–10.1)
CHLORIDE SERPL-SCNC: 107 MMOL/L — SIGNIFICANT CHANGE UP (ref 96–108)
CO2 SERPL-SCNC: 30 MMOL/L — SIGNIFICANT CHANGE UP (ref 22–31)
COLOR SPEC: YELLOW — SIGNIFICANT CHANGE UP
CREAT SERPL-MCNC: 1.78 MG/DL — HIGH (ref 0.5–1.3)
DIFF PNL FLD: ABNORMAL
EPI CELLS # UR: SIGNIFICANT CHANGE UP
GLUCOSE BLDC GLUCOMTR-MCNC: 125 MG/DL — HIGH (ref 70–99)
GLUCOSE BLDC GLUCOMTR-MCNC: 160 MG/DL — HIGH (ref 70–99)
GLUCOSE BLDC GLUCOMTR-MCNC: 161 MG/DL — HIGH (ref 70–99)
GLUCOSE BLDC GLUCOMTR-MCNC: 180 MG/DL — HIGH (ref 70–99)
GLUCOSE BLDC GLUCOMTR-MCNC: 229 MG/DL — HIGH (ref 70–99)
GLUCOSE SERPL-MCNC: 144 MG/DL — HIGH (ref 70–99)
GLUCOSE UR QL: NEGATIVE MG/DL — SIGNIFICANT CHANGE UP
HBA1C BLD-MCNC: 6.8 % — HIGH (ref 4–5.6)
HCT VFR BLD CALC: 36.2 % — LOW (ref 39–50)
HGB BLD-MCNC: 11.3 G/DL — LOW (ref 13–17)
KETONES UR-MCNC: NEGATIVE — SIGNIFICANT CHANGE UP
LEUKOCYTE ESTERASE UR-ACNC: NEGATIVE — SIGNIFICANT CHANGE UP
MCHC RBC-ENTMCNC: 30.3 PG — SIGNIFICANT CHANGE UP (ref 27–34)
MCHC RBC-ENTMCNC: 31.2 GM/DL — LOW (ref 32–36)
MCV RBC AUTO: 97.1 FL — SIGNIFICANT CHANGE UP (ref 80–100)
NITRITE UR-MCNC: NEGATIVE — SIGNIFICANT CHANGE UP
NRBC # BLD: 0 /100 WBCS — SIGNIFICANT CHANGE UP (ref 0–0)
PH UR: 5 — SIGNIFICANT CHANGE UP (ref 5–8)
PLATELET # BLD AUTO: 143 K/UL — LOW (ref 150–400)
POTASSIUM SERPL-MCNC: 4.1 MMOL/L — SIGNIFICANT CHANGE UP (ref 3.5–5.3)
POTASSIUM SERPL-SCNC: 4.1 MMOL/L — SIGNIFICANT CHANGE UP (ref 3.5–5.3)
PROT UR-MCNC: 15 MG/DL
RBC # BLD: 3.73 M/UL — LOW (ref 4.2–5.8)
RBC # FLD: 14.7 % — HIGH (ref 10.3–14.5)
RBC CASTS # UR COMP ASSIST: ABNORMAL /HPF (ref 0–4)
SODIUM SERPL-SCNC: 141 MMOL/L — SIGNIFICANT CHANGE UP (ref 135–145)
SP GR SPEC: 1.01 — SIGNIFICANT CHANGE UP (ref 1.01–1.02)
UROBILINOGEN FLD QL: NEGATIVE MG/DL — SIGNIFICANT CHANGE UP
WBC # BLD: 12.29 K/UL — HIGH (ref 3.8–10.5)
WBC # FLD AUTO: 12.29 K/UL — HIGH (ref 3.8–10.5)
WBC UR QL: SIGNIFICANT CHANGE UP

## 2019-12-09 PROCEDURE — 71045 X-RAY EXAM CHEST 1 VIEW: CPT | Mod: 26

## 2019-12-09 RX ORDER — METOPROLOL TARTRATE 50 MG
50 TABLET ORAL DAILY
Refills: 0 | Status: DISCONTINUED | OUTPATIENT
Start: 2019-12-09 | End: 2019-12-11

## 2019-12-09 RX ADMIN — SACUBITRIL AND VALSARTAN 1 TABLET(S): 24; 26 TABLET, FILM COATED ORAL at 17:40

## 2019-12-09 RX ADMIN — SENNA PLUS 2 TABLET(S): 8.6 TABLET ORAL at 21:58

## 2019-12-09 RX ADMIN — Medication 1 MILLIGRAM(S): at 11:10

## 2019-12-09 RX ADMIN — Medication 1000 UNIT(S): at 12:32

## 2019-12-09 RX ADMIN — Medication 40 MILLIGRAM(S): at 05:51

## 2019-12-09 RX ADMIN — Medication 100 MILLIGRAM(S): at 11:09

## 2019-12-09 RX ADMIN — TAMSULOSIN HYDROCHLORIDE 0.4 MILLIGRAM(S): 0.4 CAPSULE ORAL at 21:58

## 2019-12-09 RX ADMIN — Medication 2: at 07:42

## 2019-12-09 RX ADMIN — Medication 4: at 11:58

## 2019-12-09 RX ADMIN — SIMVASTATIN 40 MILLIGRAM(S): 20 TABLET, FILM COATED ORAL at 21:58

## 2019-12-09 RX ADMIN — TIOTROPIUM BROMIDE 1 CAPSULE(S): 18 CAPSULE ORAL; RESPIRATORY (INHALATION) at 11:10

## 2019-12-09 RX ADMIN — RIVAROXABAN 15 MILLIGRAM(S): KIT at 17:40

## 2019-12-09 RX ADMIN — Medication 81 MILLIGRAM(S): at 11:10

## 2019-12-09 RX ADMIN — Medication 50 MILLIGRAM(S): at 22:03

## 2019-12-09 RX ADMIN — SACUBITRIL AND VALSARTAN 1 TABLET(S): 24; 26 TABLET, FILM COATED ORAL at 05:51

## 2019-12-09 RX ADMIN — Medication 2: at 17:40

## 2019-12-09 NOTE — PROGRESS NOTE ADULT - SUBJECTIVE AND OBJECTIVE BOX
Patient is a 74y old  Male who presents with a chief complaint of sob (08 Dec 2019 21:23)      INTERVAL HPI/OVERNIGHT EVENTS:  pt is doing better  MEDICATIONS  (STANDING):  allopurinol 100 milliGRAM(s) Oral daily  aspirin enteric coated 81 milliGRAM(s) Oral daily  cholecalciferol 1000 Unit(s) Oral daily  dextrose 5%. 1000 milliLiter(s) (50 mL/Hr) IV Continuous <Continuous>  dextrose 50% Injectable 12.5 Gram(s) IV Push once  dextrose 50% Injectable 25 Gram(s) IV Push once  dextrose 50% Injectable 25 Gram(s) IV Push once  folic acid 1 milliGRAM(s) Oral daily  furosemide   Injectable 40 milliGRAM(s) IV Push daily  insulin lispro (HumaLOG) corrective regimen sliding scale   SubCutaneous three times a day before meals  rivaroxaban 15 milliGRAM(s) Oral with dinner  sacubitril 24 mG/valsartan 26 mG 1 Tablet(s) Oral two times a day  senna 2 Tablet(s) Oral at bedtime  simvastatin 40 milliGRAM(s) Oral at bedtime  tamsulosin 0.4 milliGRAM(s) Oral at bedtime  tiotropium 18 MICROgram(s) Capsule 1 Capsule(s) Inhalation daily    MEDICATIONS  (PRN):  dextrose 40% Gel 15 Gram(s) Oral once PRN Blood Glucose LESS THAN 70 milliGRAM(s)/deciliter  glucagon  Injectable 1 milliGRAM(s) IntraMuscular once PRN Glucose LESS THAN 70 milligrams/deciliter      Allergies    No Known Allergies    Intolerances        REVIEW OF SYSTEMS:  CONSTITUTIONAL: No fever, weight loss, or fatigue  EYES: No eye pain, visual disturbances, or discharge  ENMT:  No difficulty hearing, tinnitus, vertigo; No sinus or throat pain  NECK: No pain or stiffness  BREASTS: No pain, masses, or nipple discharge  RESPIRATORY: No cough, wheezing, chills or hemoptysis; No shortness of breath  CARDIOVASCULAR: No chest pain, palpitations, dizziness, or leg swelling  GASTROINTESTINAL: No abdominal or epigastric pain. No nausea, vomiting, or hematemesis; No diarrhea or constipation. No melena or hematochezia.  GENITOURINARY: No dysuria, frequency, hematuria, or incontinence  NEUROLOGICAL: No headaches, memory loss, loss of strength, numbness, or tremors  SKIN: No itching, burning, rashes, or lesions   LYMPH NODES: No enlarged glands  ENDOCRINE: No heat or cold intolerance; No hair loss  MUSCULOSKELETAL: No joint pain or swelling; No muscle, back, or extremity pain  PSYCHIATRIC: No depression, anxiety, mood swings, or difficulty sleeping  HEME/LYMPH: No easy bruising, or bleeding gums  ALLERGY AND IMMUNOLOGIC: No hives or eczema    Vital Signs Last 24 Hrs  T(C): 36.4 (09 Dec 2019 12:00), Max: 36.4 (09 Dec 2019 12:00)  T(F): 97.6 (09 Dec 2019 12:00), Max: 97.6 (09 Dec 2019 12:00)  HR: 65 (09 Dec 2019 12:00) (60 - 79)  BP: 112/62 (09 Dec 2019 12:00) (112/62 - 168/62)  BP(mean): --  RR: 18 (09 Dec 2019 12:00) (17 - 18)  SpO2: 96% (09 Dec 2019 12:00) (95% - 100%)    PHYSICAL EXAM:  GENERAL: NAD, well-groomed, well-developed  HEAD:  Atraumatic, Normocephalic  EYES: EOMI, PERRLA, conjunctiva and sclera clear  ENMT: No tonsillar erythema, exudates, or enlargement; Moist mucous membranes, Good dentition, No lesions  NECK: Supple, No JVD, Normal thyroid  NERVOUS SYSTEM:  Alert & Oriented X3, Good concentration; Motor Strength 5/5 B/L upper and lower extremities; DTRs 2+ intact and symmetric  CHEST/LUNG: Clear to percussion bilaterally; No rales, rhonchi, wheezing, or rubs  HEART: Regular rate and rhythm; No murmurs, rubs, or gallops  ABDOMEN: Soft, Nontender, Nondistended; Bowel sounds present  EXTREMITIES:  2+ Peripheral Pulses, No clubbing, cyanosis, or edema  LYMPH: No lymphadenopathy noted  SKIN: No rashes or lesions    LABS:                        11.3   12.29 )-----------( 143      ( 09 Dec 2019 07:05 )             36.2     -    141  |  107  |  39<H>  ----------------------------<  144<H>  4.1   |  30  |  1.78<H>    Ca    8.8      09 Dec 2019 07:05    TPro  7.9  /  Alb  3.9  /  TBili  0.6  /  DBili  x   /  AST  65<H>  /  ALT  62  /  AlkPhos  130<H>  12-08    PT/INR - ( 08 Dec 2019 08:20 )   PT: 24.7 sec;   INR: 2.15 ratio         PTT - ( 08 Dec 2019 08:20 )  PTT:30.2 sec  Urinalysis Basic - ( 09 Dec 2019 10:07 )    Color: Yellow / Appearance: Clear / S.010 / pH: x  Gluc: x / Ketone: Negative  / Bili: Negative / Urobili: Negative mg/dL   Blood: x / Protein: 15 mg/dL / Nitrite: Negative   Leuk Esterase: Negative / RBC: 3-5 /HPF / WBC 3-5   Sq Epi: x / Non Sq Epi: Few / Bacteria: Occasional      CAPILLARY BLOOD GLUCOSE      POCT Blood Glucose.: 229 mg/dL (09 Dec 2019 11:39)  POCT Blood Glucose.: 160 mg/dL (09 Dec 2019 07:11)  POCT Blood Glucose.: 162 mg/dL (08 Dec 2019 16:14)    CULTURES:  Culture Results:   No growth to date. ( @ 11:57)  Culture Results:   No growth to date. ( @ 11:57)    HEMOGLOBIN A1C:    CHOLESTEROL:    ABG - ( 08 Dec 2019 08:46 )  pH, Arterial: x     pH, Blood: 7.31  /  pCO2: 49    /  pO2: 240   / HCO3: 24    / Base Excess: -1.9  /  SaO2: 99                  RADIOLOGY & ADDITIONAL TESTS:

## 2019-12-09 NOTE — PHYSICAL THERAPY INITIAL EVALUATION ADULT - ACTIVE RANGE OF MOTION EXAMINATION, REHAB EVAL
ky. upper extremity Active ROM was WNL (within normal limits)/bilateral lower extremity Active ROM was WNL (within normal limits)

## 2019-12-09 NOTE — PROGRESS NOTE ADULT - SUBJECTIVE AND OBJECTIVE BOX
INTERVAL HPI/OVERNIGHT EVENTS:  sitting at bedside, feels better  Vital Signs Last 24 Hrs  T(C): 36.4 (09 Dec 2019 12:00), Max: 36.4 (09 Dec 2019 12:00)  T(F): 97.6 (09 Dec 2019 12:00), Max: 97.6 (09 Dec 2019 12:00)  HR: 66 (09 Dec 2019 17:58) (60 - 79)  BP: 156/58 (09 Dec 2019 17:58) (112/62 - 168/62)  BP(mean): --  RR: 18 (09 Dec 2019 17:58) (17 - 18)  SpO2: 97% (09 Dec 2019 17:58) (95% - 99%)        PHYSICAL EXAM:  GEN:         Awake, responsive and comfortable.  HEENT:    Normal.    RESP:    bilat BS, rhonchi  CVS:             Regular rate and rhythm.   ABD:         Soft, non-tender, non-distended;   :             No costovertebral angle tenderness  EXTR:            No clubbing, cyanosis or edema  CNS:              Intact sensory and motor function.        MEDICATIONS  (STANDING):  allopurinol 100 milliGRAM(s) Oral daily  aspirin enteric coated 81 milliGRAM(s) Oral daily  cholecalciferol 1000 Unit(s) Oral daily  dextrose 5%. 1000 milliLiter(s) (50 mL/Hr) IV Continuous <Continuous>  dextrose 50% Injectable 12.5 Gram(s) IV Push once  dextrose 50% Injectable 25 Gram(s) IV Push once  dextrose 50% Injectable 25 Gram(s) IV Push once  folic acid 1 milliGRAM(s) Oral daily  furosemide   Injectable 40 milliGRAM(s) IV Push daily  insulin lispro (HumaLOG) corrective regimen sliding scale   SubCutaneous three times a day before meals  rivaroxaban 15 milliGRAM(s) Oral with dinner  sacubitril 24 mG/valsartan 26 mG 1 Tablet(s) Oral two times a day  senna 2 Tablet(s) Oral at bedtime  simvastatin 40 milliGRAM(s) Oral at bedtime  tamsulosin 0.4 milliGRAM(s) Oral at bedtime  tiotropium 18 MICROgram(s) Capsule 1 Capsule(s) Inhalation daily    MEDICATIONS  (PRN):  dextrose 40% Gel 15 Gram(s) Oral once PRN Blood Glucose LESS THAN 70 milliGRAM(s)/deciliter  glucagon  Injectable 1 milliGRAM(s) IntraMuscular once PRN Glucose LESS THAN 70 milligrams/deciliter        LABS:                        11.3   12.29 )-----------( 143      ( 09 Dec 2019 07:05 )             36.2         141  |  107  |  39<H>  ----------------------------<  144<H>  4.1   |  30  |  1.78<H>    Ca    8.8      09 Dec 2019 07:05    TPro  7.9  /  Alb  3.9  /  TBili  0.6  /  DBili  x   /  AST  65<H>  /  ALT  62  /  AlkPhos  130<H>  1208    PT/INR - ( 08 Dec 2019 08:20 )   PT: 24.7 sec;   INR: 2.15 ratio         PTT - ( 08 Dec 2019 08:20 )  PTT:30.2 sec    Urinalysis Basic - ( 09 Dec 2019 10:07 )    Color: Yellow / Appearance: Clear / S.010 / pH: x  Gluc: x / Ketone: Negative  / Bili: Negative / Urobili: Negative mg/dL   Blood: x / Protein: 15 mg/dL / Nitrite: Negative   Leuk Esterase: Negative / RBC: 3-5 /HPF / WBC 3-5   Sq Epi: x / Non Sq Epi: Few / Bacteria: Occasional          RADIOLOGY & ADDITIONAL STUDIES:  < from: Xray Chest 1 View- PORTABLE-Routine (19 @ 07:13) >    EXAM:  XR CHEST PORTABLE ROUTINE 1V                            PROCEDURE DATE:  2019          INTERPRETATION:  Chest one view    HISTORY: Shortness of breath    Comparison: 2019    Radiographic examination shows the heart to be similarlyenlarged in   size. The lungs show increased central congestion. There is no evidence   of focal infiltrate, pneumothorax or pleural effusion. Left cardiac   defibrillator and sternal wires are again noted.    IMPRESSION: Increased central congestion.    Thank you for this referral.                FOREST RICO M.D., ATTENDING RADIOLOGIST  This document has been electronically signed. Dec  9 2019 11:39AM                < end of copied text >    ASSESSMENT: COPD, CHF  PLAN: continue diuretics, decrease nco2 to 3l/min and monitor sats. Bipap for sleep INTERVAL HPI/OVERNIGHT EVENTS:  sitting at bedside, feels better  Vital Signs Last 24 Hrs  T(C): 36.4 (09 Dec 2019 12:00), Max: 36.4 (09 Dec 2019 12:00)  T(F): 97.6 (09 Dec 2019 12:00), Max: 97.6 (09 Dec 2019 12:00)  HR: 66 (09 Dec 2019 17:58) (60 - 79)  BP: 156/58 (09 Dec 2019 17:58) (112/62 - 168/62)  BP(mean): --  RR: 18 (09 Dec 2019 17:58) (17 - 18)  SpO2: 97% (09 Dec 2019 17:58) (95% - 99%)        PHYSICAL EXAM:  GEN:         Awake, responsive and comfortable.  HEENT:    Normal.    RESP:    bilat BS, rhonchi  CVS:             Regular rate and rhythm.   ABD:         Soft, non-tender, non-distended;   :             No costovertebral angle tenderness  EXTR:            No clubbing, cyanosis or edema  CNS:              Intact sensory and motor function.        MEDICATIONS  (STANDING):  allopurinol 100 milliGRAM(s) Oral daily  aspirin enteric coated 81 milliGRAM(s) Oral daily  cholecalciferol 1000 Unit(s) Oral daily  dextrose 5%. 1000 milliLiter(s) (50 mL/Hr) IV Continuous <Continuous>  dextrose 50% Injectable 12.5 Gram(s) IV Push once  dextrose 50% Injectable 25 Gram(s) IV Push once  dextrose 50% Injectable 25 Gram(s) IV Push once  folic acid 1 milliGRAM(s) Oral daily  furosemide   Injectable 40 milliGRAM(s) IV Push daily  insulin lispro (HumaLOG) corrective regimen sliding scale   SubCutaneous three times a day before meals  rivaroxaban 15 milliGRAM(s) Oral with dinner  sacubitril 24 mG/valsartan 26 mG 1 Tablet(s) Oral two times a day  senna 2 Tablet(s) Oral at bedtime  simvastatin 40 milliGRAM(s) Oral at bedtime  tamsulosin 0.4 milliGRAM(s) Oral at bedtime  tiotropium 18 MICROgram(s) Capsule 1 Capsule(s) Inhalation daily    MEDICATIONS  (PRN):  dextrose 40% Gel 15 Gram(s) Oral once PRN Blood Glucose LESS THAN 70 milliGRAM(s)/deciliter  glucagon  Injectable 1 milliGRAM(s) IntraMuscular once PRN Glucose LESS THAN 70 milligrams/deciliter        LABS:                        11.3   12.29 )-----------( 143      ( 09 Dec 2019 07:05 )             36.2         141  |  107  |  39<H>  ----------------------------<  144<H>  4.1   |  30  |  1.78<H>    Ca    8.8      09 Dec 2019 07:05    TPro  7.9  /  Alb  3.9  /  TBili  0.6  /  DBili  x   /  AST  65<H>  /  ALT  62  /  AlkPhos  130<H>  1208    PT/INR - ( 08 Dec 2019 08:20 )   PT: 24.7 sec;   INR: 2.15 ratio         PTT - ( 08 Dec 2019 08:20 )  PTT:30.2 sec    Urinalysis Basic - ( 09 Dec 2019 10:07 )    Color: Yellow / Appearance: Clear / S.010 / pH: x  Gluc: x / Ketone: Negative  / Bili: Negative / Urobili: Negative mg/dL   Blood: x / Protein: 15 mg/dL / Nitrite: Negative   Leuk Esterase: Negative / RBC: 3-5 /HPF / WBC 3-5   Sq Epi: x / Non Sq Epi: Few / Bacteria: Occasional          RADIOLOGY & ADDITIONAL STUDIES:  < from: Xray Chest 1 View- PORTABLE-Routine (19 @ 07:13) >    EXAM:  XR CHEST PORTABLE ROUTINE 1V                            PROCEDURE DATE:  2019          INTERPRETATION:  Chest one view    HISTORY: Shortness of breath    Comparison: 2019    Radiographic examination shows the heart to be similarlyenlarged in   size. The lungs show increased central congestion. There is no evidence   of focal infiltrate, pneumothorax or pleural effusion. Left cardiac   defibrillator and sternal wires are again noted.    IMPRESSION: Increased central congestion.    Thank you for this referral.                FOREST RICO M.D., ATTENDING RADIOLOGIST  This document has been electronically signed. Dec  9 2019 11:39AM                < end of copied text >    ASSESSMENT: COPD, CHF  PLAN: continue diuretics, decrease nco2 to 3l/min and monitor sats. Bipap for sleep. check echo

## 2019-12-09 NOTE — PHYSICAL THERAPY INITIAL EVALUATION ADULT - LIVES WITH, PROFILE
-S/p angiogram. fem-pop bypass postponed secondary to suboptimal respiratory status - hold off for now till cardiopulmonary optimization    -Consented via surrogate, Eres (patient's brother) & patient agreeable to surgery.   -L foot with cellulitis with resulting sepsis which is now improved since admission  S/p vancomycin and zosyn. Completed Unasyn on 2/22.  Podiatry has evaluated patient and report good improvement. recs appreciated.    Wound culture grew Acinetobacter, corynebacterium, staph haemolyticus and staph aureus. Blood cx with NGTD.  -LLE xrays negative for free air; CT read negative for free air. spouse/children

## 2019-12-09 NOTE — PHYSICAL THERAPY INITIAL EVALUATION ADULT - ADDITIONAL COMMENTS
Patient lives c wife and children in a pvt house c 8 entry steps c B/L rails(reachable), 1 flight of stairs c R rail up inside. Independent c all ADL's and ambulation without device.

## 2019-12-09 NOTE — PROGRESS NOTE ADULT - SUBJECTIVE AND OBJECTIVE BOX
74 years old male by ems with cpap c/o respiratory distress. EMS sts pt's blood pressure was very high, received nitro spray x 4 aspirin 162 mg. Pt is alert and oriented but unable to speaking in clear full sentences unable to give detail hx (08 Dec 2019 11:01)      Chief Complaint:  Patient is a 74y old  Male who presents with a chief complaint of sob (08 Dec 2019 20:14)      Review of Systems:    General:  No wt loss, fevers, chills, night sweats  Eyes:  Good vision, no reported pain  ENT:  No sore throat, pain, runny nose, dysphagia  CV:  No pain, palpitations, hypo/hypertension  Resp:  dyspnea, cough, tachypnea, wheezing  GI:  No pain, nausea, vomiting, diarrhea, constipation         Social History/Family History  SOCHX:   tobacco,  -  alcohol    FMHX: FA/MO  - contributory       Discussed with:  PMD, Family    Physical Exam:    Vital Signs:  Vital Signs Last 24 Hrs  T(C): 36.1 (08 Dec 2019 16:51), Max: 37 (08 Dec 2019 08:05)  T(F): 97 (08 Dec 2019 16:51), Max: 98.6 (08 Dec 2019 08:05)  HR: 69 (08 Dec 2019 16:55) (60 - 69)  BP: 134/67 (08 Dec 2019 16:51) (134/67 - 162/77)  BP(mean): --  RR: 18 (08 Dec 2019 16:51) (18 - 33)  SpO2: 98% (08 Dec 2019 16:55) (96% - 100%)  Daily Height in cm: 177.8 (08 Dec 2019 13:38)    Daily   I&O's Summary    08 Dec 2019 07:01  -  08 Dec 2019 21:24  --------------------------------------------------------  IN: 0 mL / OUT: 600 mL / NET: -600 mL          Chest:  decreased bases BL  Cardiovascular:  Regular rhythm, S1, S2, no murmur/rub/S3/S4, no carotid/femoral/abdominal bruit,   Abdomen:  Soft, non-tender, non-distended, normoactive bowel sounds, no HSM      Laboratory:                          13.1   20.94 )-----------( 182      ( 08 Dec 2019 08:20 )             40.7     12-08    141  |  104  |  41<H>  ----------------------------<  306<H>  4.1   |  27  |  1.91<H>    Ca    8.8      08 Dec 2019 09:13    TPro  7.9  /  Alb  3.9  /  TBili  0.6  /  DBili  x   /  AST  65<H>  /  ALT  62  /  AlkPhos  130<H>  12-08    ABG - ( 08 Dec 2019 08:46 )  pH, Arterial: x     pH, Blood: 7.31  /  pCO2: 49    /  pO2: 240   / HCO3: 24    / Base Excess: -1.9  /  SaO2: 99                CARDIAC MARKERS ( 08 Dec 2019 15:58 )  .468 ng/mL / x     / x     / x     / 4.5 ng/mL  CARDIAC MARKERS ( 08 Dec 2019 09:13 )  .167 ng/mL / x     / 94 U/L / x     / 2.6 ng/mL      CAPILLARY BLOOD GLUCOSE      POCT Blood Glucose.: 162 mg/dL (08 Dec 2019 16:14)  POCT Blood Glucose.: 218 mg/dL (08 Dec 2019 12:01)  POCT Blood Glucose.: 320 mg/dL (08 Dec 2019 08:00)    LIVER FUNCTIONS - ( 08 Dec 2019 09:13 )  Alb: 3.9 g/dL / Pro: 7.9 gm/dL / ALK PHOS: 130 U/L / ALT: 62 U/L / AST: 65 U/L / GGT: x           PT/INR - ( 08 Dec 2019 08:20 )   PT: 24.7 sec;   INR: 2.15 ratio         PTT - ( 08 Dec 2019 08:20 )  PTT:30.2 sec      Assessment:  asked to assess this well-known patient with shortness of breath  The patient has a known significant cardiac history with chronic congestive heart failure and an implantable automatic defibrillator  The patient remains on CPAP after a pulmonary consultation  Pulmonary management continues  Diuresis continues  Previous echoes,  one being performed in January 2018 revealed similar findings  A repeat diagnostic echocardiogram is ordered and pending  Mildly leaked troponin levels and a renal insufficient patient was not likely to be myocardial infarction, but more likely demand ischemia

## 2019-12-09 NOTE — PHYSICAL THERAPY INITIAL EVALUATION ADULT - GENERAL OBSERVATIONS, REHAB EVAL
Chart (EMR) reviewed. Received supine c HOB elevated, NAD. +cardiac monitor, +O2 via nasal cannula, +Bipap on standby, +heplock. Alert. Ox4. Able to follow multistep commands/directions.

## 2019-12-09 NOTE — PHYSICAL THERAPY INITIAL EVALUATION ADULT - GAIT TRAINING, PT EVAL
Pt will improve ambulation to ~ 300 feet Independently without device within 2 weeks. Pt will negotiate ~ 12 steps c rail Independently within 2 weeks.

## 2019-12-10 LAB
ANION GAP SERPL CALC-SCNC: 5 MMOL/L — SIGNIFICANT CHANGE UP (ref 5–17)
BUN SERPL-MCNC: 35 MG/DL — HIGH (ref 7–23)
CALCIUM SERPL-MCNC: 9.1 MG/DL — SIGNIFICANT CHANGE UP (ref 8.5–10.1)
CHLORIDE SERPL-SCNC: 107 MMOL/L — SIGNIFICANT CHANGE UP (ref 96–108)
CO2 SERPL-SCNC: 30 MMOL/L — SIGNIFICANT CHANGE UP (ref 22–31)
CREAT SERPL-MCNC: 1.62 MG/DL — HIGH (ref 0.5–1.3)
CULTURE RESULTS: NO GROWTH — SIGNIFICANT CHANGE UP
GLUCOSE BLDC GLUCOMTR-MCNC: 145 MG/DL — HIGH (ref 70–99)
GLUCOSE BLDC GLUCOMTR-MCNC: 148 MG/DL — HIGH (ref 70–99)
GLUCOSE BLDC GLUCOMTR-MCNC: 169 MG/DL — HIGH (ref 70–99)
GLUCOSE BLDC GLUCOMTR-MCNC: 221 MG/DL — HIGH (ref 70–99)
GLUCOSE SERPL-MCNC: 139 MG/DL — HIGH (ref 70–99)
HCT VFR BLD CALC: 37.1 % — LOW (ref 39–50)
HGB BLD-MCNC: 11.5 G/DL — LOW (ref 13–17)
MCHC RBC-ENTMCNC: 30.9 PG — SIGNIFICANT CHANGE UP (ref 27–34)
MCHC RBC-ENTMCNC: 31 GM/DL — LOW (ref 32–36)
MCV RBC AUTO: 99.7 FL — SIGNIFICANT CHANGE UP (ref 80–100)
NRBC # BLD: 0 /100 WBCS — SIGNIFICANT CHANGE UP (ref 0–0)
PLATELET # BLD AUTO: 148 K/UL — LOW (ref 150–400)
POTASSIUM SERPL-MCNC: 4.1 MMOL/L — SIGNIFICANT CHANGE UP (ref 3.5–5.3)
POTASSIUM SERPL-SCNC: 4.1 MMOL/L — SIGNIFICANT CHANGE UP (ref 3.5–5.3)
RBC # BLD: 3.72 M/UL — LOW (ref 4.2–5.8)
RBC # FLD: 14.8 % — HIGH (ref 10.3–14.5)
SODIUM SERPL-SCNC: 142 MMOL/L — SIGNIFICANT CHANGE UP (ref 135–145)
SPECIMEN SOURCE: SIGNIFICANT CHANGE UP
WBC # BLD: 10.13 K/UL — SIGNIFICANT CHANGE UP (ref 3.8–10.5)
WBC # FLD AUTO: 10.13 K/UL — SIGNIFICANT CHANGE UP (ref 3.8–10.5)

## 2019-12-10 RX ADMIN — Medication 4: at 12:22

## 2019-12-10 RX ADMIN — Medication 1 MILLIGRAM(S): at 12:21

## 2019-12-10 RX ADMIN — SACUBITRIL AND VALSARTAN 1 TABLET(S): 24; 26 TABLET, FILM COATED ORAL at 17:35

## 2019-12-10 RX ADMIN — SACUBITRIL AND VALSARTAN 1 TABLET(S): 24; 26 TABLET, FILM COATED ORAL at 05:18

## 2019-12-10 RX ADMIN — Medication 81 MILLIGRAM(S): at 12:36

## 2019-12-10 RX ADMIN — SENNA PLUS 2 TABLET(S): 8.6 TABLET ORAL at 22:06

## 2019-12-10 RX ADMIN — TIOTROPIUM BROMIDE 1 CAPSULE(S): 18 CAPSULE ORAL; RESPIRATORY (INHALATION) at 12:20

## 2019-12-10 RX ADMIN — Medication 50 MILLIGRAM(S): at 09:43

## 2019-12-10 RX ADMIN — SIMVASTATIN 40 MILLIGRAM(S): 20 TABLET, FILM COATED ORAL at 22:06

## 2019-12-10 RX ADMIN — TAMSULOSIN HYDROCHLORIDE 0.4 MILLIGRAM(S): 0.4 CAPSULE ORAL at 22:06

## 2019-12-10 RX ADMIN — Medication 100 MILLIGRAM(S): at 12:21

## 2019-12-10 RX ADMIN — Medication 1000 UNIT(S): at 17:35

## 2019-12-10 RX ADMIN — RIVAROXABAN 15 MILLIGRAM(S): KIT at 17:35

## 2019-12-10 RX ADMIN — Medication 40 MILLIGRAM(S): at 05:18

## 2019-12-10 NOTE — DIETITIAN INITIAL EVALUATION ADULT. - OTHER INFO
Pt with DM2, CHF, COPD, HTN, High cholesterol. Pt reported good appetite, observed pt ate >75% of breakfast this AM. Pt reported he follows no added salt diet at home, and watches his CHO/sugar intake. Pt's A1c decreased from 9.8% (1/19/18) to 6.8% (12/9/19) in about 2 years. Diet hx: Breakfast: salmon with eggs, toast, coffee; Lunch: salad and low sodium soup; Dinner: [place] with vegetables, bread. Pt SMBG 2x daily, usually around 140 mg/dL, takes nateglinide 120 mg 3x daily before meals and Toujeo 53 units daily at bedtime. Pt with good knowledge regarding nutrition-related recommendations, however reviewed, emphasized, and encouraged pt to continue to follow low salt, CHO consistent diet, as well as low cholesterol diet. Pt denied food allergies, denied difficulty chewing/swallowing, denied n/v/d/c. Pt with DM2, CHF, COPD, HTN, High cholesterol. Pt reported good appetite, observed pt ate >75% of breakfast this AM. Pt reported he follows no added salt diet at home, monitors CHO/sugar intake. Pt's HgbA1c decreased from 9.8% (1/19/18) to 6.8% (12/9/19) in about 2 years. Diet hx: Breakfast: salmon with eggs, toast, coffee; Lunch: salad and low sodium soup; Dinner: [place] with vegetables, bread. Pt SMBG 2x daily, usually around 140 mg/dL, takes nateglinide 120 mg 3x daily before meals and Toujeo 53 units daily at bedtime. Pt with good knowledge regarding nutrition-related recommendations, just reviewed, emphasized, and encouraged pt to continue to follow low salt, CHO consistent diet, as well as low cholesterol diet; pt agreeable. Pt denied food allergies, denied difficulty chewing/swallowing, denied n/v/d/c.

## 2019-12-10 NOTE — DIETITIAN INITIAL EVALUATION ADULT. - PERTINENT LABORATORY DATA
12-10 Na142 mmol/L Glu 139 mg/dL<H> K+ 4.1 mmol/L Cr  1.62 mg/dL<H> BUN 35 mg/dL<H> WBC 10.13 K/uL 12-08 Alb 3.9 g/dL 12-09 [id number] 6.8 %<H>

## 2019-12-10 NOTE — PROGRESS NOTE ADULT - SUBJECTIVE AND OBJECTIVE BOX
INTERVAL HPI/OVERNIGHT EVENTS:  feels better    Vital Signs Last 24 Hrs  T(C): 36.7 (10 Dec 2019 17:30), Max: 36.7 (10 Dec 2019 17:30)  T(F): 98.1 (10 Dec 2019 17:30), Max: 98.1 (10 Dec 2019 17:30)  HR: 72 (10 Dec 2019 21:26) (59 - 72)  BP: 133/54 (10 Dec 2019 17:30) (125/51 - 150/68)  BP(mean): --  RR: 17 (10 Dec 2019 17:30) (17 - 20)  SpO2: 94% (10 Dec 2019 21:26) (94% - 100%)        PHYSICAL EXAM:  GEN:         Awake, responsive, no acute distress  HEENT:    Normal.    RESP:        bilat diminished BS  CVS:             Regular rate and rhythm.   ABD:         Soft, non-tender, non-distended;   :             No costovertebral angle tenderness  EXTR:           mild LE edema  CNS:              Intact sensory and motor function.        MEDICATIONS  (STANDING):  allopurinol 100 milliGRAM(s) Oral daily  aspirin enteric coated 81 milliGRAM(s) Oral daily  cholecalciferol 1000 Unit(s) Oral daily  dextrose 5%. 1000 milliLiter(s) (50 mL/Hr) IV Continuous <Continuous>  dextrose 50% Injectable 12.5 Gram(s) IV Push once  dextrose 50% Injectable 25 Gram(s) IV Push once  dextrose 50% Injectable 25 Gram(s) IV Push once  folic acid 1 milliGRAM(s) Oral daily  furosemide   Injectable 40 milliGRAM(s) IV Push daily  insulin lispro (HumaLOG) corrective regimen sliding scale   SubCutaneous three times a day before meals  metoprolol succinate ER 50 milliGRAM(s) Oral daily  rivaroxaban 15 milliGRAM(s) Oral with dinner  sacubitril 24 mG/valsartan 26 mG 1 Tablet(s) Oral two times a day  senna 2 Tablet(s) Oral at bedtime  simvastatin 40 milliGRAM(s) Oral at bedtime  tamsulosin 0.4 milliGRAM(s) Oral at bedtime  tiotropium 18 MICROgram(s) Capsule 1 Capsule(s) Inhalation daily    MEDICATIONS  (PRN):  dextrose 40% Gel 15 Gram(s) Oral once PRN Blood Glucose LESS THAN 70 milliGRAM(s)/deciliter  glucagon  Injectable 1 milliGRAM(s) IntraMuscular once PRN Glucose LESS THAN 70 milligrams/deciliter        LABS:                        11.5   10.13 )-----------( 148      ( 10 Dec 2019 06:40 )             37.1     12-10    142  |  107  |  35<H>  ----------------------------<  139<H>  4.1   |  30  |  1.62<H>    Ca    9.1      10 Dec 2019 06:40          Urinalysis Basic - ( 09 Dec 2019 10:07 )    Color: Yellow / Appearance: Clear / S.010 / pH: x  Gluc: x / Ketone: Negative  / Bili: Negative / Urobili: Negative mg/dL   Blood: x / Protein: 15 mg/dL / Nitrite: Negative   Leuk Esterase: Negative / RBC: 3-5 /HPF / WBC 3-5   Sq Epi: x / Non Sq Epi: Few / Bacteria: Occasional          RADIOLOGY & ADDITIONAL STUDIES:    ASSESSMENT: COPD, CHF, CAD, EMBER  PLAN: cardio eval noted. continue current care. home cpap and nasal cannula o2 in place.

## 2019-12-10 NOTE — PROGRESS NOTE ADULT - SUBJECTIVE AND OBJECTIVE BOX
Patient is a 74y old  Male who presents with a chief complaint of sob (09 Dec 2019 20:48)      INTERVAL HPI/OVERNIGHT EVENTS:  pt is doing better  MEDICATIONS  (STANDING):  allopurinol 100 milliGRAM(s) Oral daily  aspirin enteric coated 81 milliGRAM(s) Oral daily  cholecalciferol 1000 Unit(s) Oral daily  dextrose 5%. 1000 milliLiter(s) (50 mL/Hr) IV Continuous <Continuous>  dextrose 50% Injectable 12.5 Gram(s) IV Push once  dextrose 50% Injectable 25 Gram(s) IV Push once  dextrose 50% Injectable 25 Gram(s) IV Push once  folic acid 1 milliGRAM(s) Oral daily  furosemide   Injectable 40 milliGRAM(s) IV Push daily  insulin lispro (HumaLOG) corrective regimen sliding scale   SubCutaneous three times a day before meals  metoprolol succinate ER 50 milliGRAM(s) Oral daily  rivaroxaban 15 milliGRAM(s) Oral with dinner  sacubitril 24 mG/valsartan 26 mG 1 Tablet(s) Oral two times a day  senna 2 Tablet(s) Oral at bedtime  simvastatin 40 milliGRAM(s) Oral at bedtime  tamsulosin 0.4 milliGRAM(s) Oral at bedtime  tiotropium 18 MICROgram(s) Capsule 1 Capsule(s) Inhalation daily    MEDICATIONS  (PRN):  dextrose 40% Gel 15 Gram(s) Oral once PRN Blood Glucose LESS THAN 70 milliGRAM(s)/deciliter  glucagon  Injectable 1 milliGRAM(s) IntraMuscular once PRN Glucose LESS THAN 70 milligrams/deciliter      Allergies    No Known Allergies    Intolerances        REVIEW OF SYSTEMS:  CONSTITUTIONAL: No fever, weight loss, or fatigue  EYES: No eye pain, visual disturbances, or discharge  ENMT:  No difficulty hearing, tinnitus, vertigo; No sinus or throat pain  NECK: No pain or stiffness  BREASTS: No pain, masses, or nipple discharge  RESPIRATORY: No cough, wheezing, chills or hemoptysis; No shortness of breath  CARDIOVASCULAR: No chest pain, palpitations, dizziness, or leg swelling  GASTROINTESTINAL: No abdominal or epigastric pain. No nausea, vomiting, or hematemesis; No diarrhea or constipation. No melena or hematochezia.  GENITOURINARY: No dysuria, frequency, hematuria, or incontinence  NEUROLOGICAL: No headaches, memory loss, loss of strength, numbness, or tremors  SKIN: No itching, burning, rashes, or lesions   LYMPH NODES: No enlarged glands  ENDOCRINE: No heat or cold intolerance; No hair loss  MUSCULOSKELETAL: No joint pain or swelling; No muscle, back, or extremity pain  PSYCHIATRIC: No depression, anxiety, mood swings, or difficulty sleeping  HEME/LYMPH: No easy bruising, or bleeding gums  ALLERGY AND IMMUNOLOGIC: No hives or eczema    Vital Signs Last 24 Hrs  T(C): 35.8 (10 Dec 2019 05:18), Max: 35.8 (10 Dec 2019 05:18)  T(F): 96.4 (10 Dec 2019 05:18), Max: 96.4 (10 Dec 2019 05:18)  HR: 60 (10 Dec 2019 11:12) (59 - 70)  BP: 125/51 (10 Dec 2019 11:05) (125/51 - 166/59)  BP(mean): --  RR: 18 (10 Dec 2019 11:05) (18 - 18)  SpO2: 98% (10 Dec 2019 11:12) (95% - 100%)    PHYSICAL EXAM:  GENERAL: NAD, well-groomed, well-developed  HEAD:  Atraumatic, Normocephalic  EYES: EOMI, PERRLA, conjunctiva and sclera clear  ENMT: No tonsillar erythema, exudates, or enlargement; Moist mucous membranes, Good dentition, No lesions  NECK: Supple, No JVD, Normal thyroid  NERVOUS SYSTEM:  Alert & Oriented X3, Good concentration; Motor Strength 5/5 B/L upper and lower extremities; DTRs 2+ intact and symmetric  CHEST/LUNG: Clear to percussion bilaterally; No rales, rhonchi, wheezing, or rubs  HEART: Regular rate and rhythm; No murmurs, rubs, or gallops  ABDOMEN: Soft, Nontender, Nondistended; Bowel sounds present  EXTREMITIES:  2+ Peripheral Pulses, No clubbing, cyanosis, or edema  LYMPH: No lymphadenopathy noted  SKIN: No rashes or lesions    LABS:                        11.5   10. )-----------( 148      ( 10 Dec 2019 06:40 )             37.1     12-10    142  |  107  |  35<H>  ----------------------------<  139<H>  4.1   |  30  |  1.62<H>    Ca    9.1      10 Dec 2019 06:40        Urinalysis Basic - ( 09 Dec 2019 10:07 )    Color: Yellow / Appearance: Clear / S.010 / pH: x  Gluc: x / Ketone: Negative  / Bili: Negative / Urobili: Negative mg/dL   Blood: x / Protein: 15 mg/dL / Nitrite: Negative   Leuk Esterase: Negative / RBC: 3-5 /HPF / WBC 3-5   Sq Epi: x / Non Sq Epi: Few / Bacteria: Occasional      CAPILLARY BLOOD GLUCOSE      POCT Blood Glucose.: 221 mg/dL (10 Dec 2019 12:17)  POCT Blood Glucose.: 145 mg/dL (10 Dec 2019 08:08)  POCT Blood Glucose.: 125 mg/dL (09 Dec 2019 21:39)  POCT Blood Glucose.: 161 mg/dL (09 Dec 2019 17:35)  POCT Blood Glucose.: 180 mg/dL (09 Dec 2019 16:22)    CULTURES:  Culture Results:   No growth ( @ 12:20)  Culture Results:   No growth to date. ( @ 11:57)  Culture Results:   No growth to date. ( @ 11:57)    HEMOGLOBIN A1C:    CHOLESTEROL:        RADIOLOGY & ADDITIONAL TESTS:

## 2019-12-10 NOTE — PROGRESS NOTE ADULT - PROBLEM SELECTOR PLAN 1
pt requires home O2 due to CHF/COPD pt RA O2 is 90 % sitting, on ambulation O2 on RA is 86%,  pt O2 sat on 4L while ambulation is 89 %. AT rest after ambulation is 93 %

## 2019-12-10 NOTE — DIETITIAN INITIAL EVALUATION ADULT. - PERTINENT MEDS FT
MEDICATIONS  (STANDING):  allopurinol 100 milliGRAM(s) Oral daily  aspirin enteric coated 81 milliGRAM(s) Oral daily  cholecalciferol 1000 Unit(s) Oral daily  dextrose 5%. 1000 milliLiter(s) (50 mL/Hr) IV Continuous <Continuous>  dextrose 50% Injectable 12.5 Gram(s) IV Push once  dextrose 50% Injectable 25 Gram(s) IV Push once  dextrose 50% Injectable 25 Gram(s) IV Push once  folic acid 1 milliGRAM(s) Oral daily  furosemide   Injectable 40 milliGRAM(s) IV Push daily  insulin lispro (HumaLOG) corrective regimen sliding scale   SubCutaneous three times a day before meals  metoprolol succinate ER 50 milliGRAM(s) Oral daily  rivaroxaban 15 milliGRAM(s) Oral with dinner  sacubitril 24 mG/valsartan 26 mG 1 Tablet(s) Oral two times a day  senna 2 Tablet(s) Oral at bedtime  simvastatin 40 milliGRAM(s) Oral at bedtime  tamsulosin 0.4 milliGRAM(s) Oral at bedtime  tiotropium 18 MICROgram(s) Capsule 1 Capsule(s) Inhalation daily    MEDICATIONS  (PRN):  dextrose 40% Gel 15 Gram(s) Oral once PRN Blood Glucose LESS THAN 70 milliGRAM(s)/deciliter  glucagon  Injectable 1 milliGRAM(s) IntraMuscular once PRN Glucose LESS THAN 70 milligrams/deciliter

## 2019-12-11 ENCOUNTER — TRANSCRIPTION ENCOUNTER (OUTPATIENT)
Age: 74
End: 2019-12-11

## 2019-12-11 VITALS
RESPIRATION RATE: 17 BRPM | HEART RATE: 60 BPM | TEMPERATURE: 98 F | DIASTOLIC BLOOD PRESSURE: 61 MMHG | OXYGEN SATURATION: 98 % | SYSTOLIC BLOOD PRESSURE: 139 MMHG

## 2019-12-11 LAB
GLUCOSE BLDC GLUCOMTR-MCNC: 167 MG/DL — HIGH (ref 70–99)
GLUCOSE BLDC GLUCOMTR-MCNC: 254 MG/DL — HIGH (ref 70–99)

## 2019-12-11 RX ORDER — SACUBITRIL AND VALSARTAN 24; 26 MG/1; MG/1
1 TABLET, FILM COATED ORAL
Qty: 0 | Refills: 0 | DISCHARGE

## 2019-12-11 RX ORDER — CHOLECALCIFEROL (VITAMIN D3) 125 MCG
1000 CAPSULE ORAL
Qty: 0 | Refills: 0 | DISCHARGE
Start: 2019-12-11

## 2019-12-11 RX ORDER — TIOTROPIUM BROMIDE 18 UG/1
1 CAPSULE ORAL; RESPIRATORY (INHALATION)
Qty: 0 | Refills: 0 | DISCHARGE
Start: 2019-12-11

## 2019-12-11 RX ADMIN — Medication 1000 UNIT(S): at 13:21

## 2019-12-11 RX ADMIN — Medication 50 MILLIGRAM(S): at 05:42

## 2019-12-11 RX ADMIN — SACUBITRIL AND VALSARTAN 1 TABLET(S): 24; 26 TABLET, FILM COATED ORAL at 05:41

## 2019-12-11 RX ADMIN — TIOTROPIUM BROMIDE 1 CAPSULE(S): 18 CAPSULE ORAL; RESPIRATORY (INHALATION) at 11:00

## 2019-12-11 RX ADMIN — Medication 6: at 10:55

## 2019-12-11 RX ADMIN — Medication 100 MILLIGRAM(S): at 11:00

## 2019-12-11 RX ADMIN — Medication 40 MILLIGRAM(S): at 05:41

## 2019-12-11 RX ADMIN — Medication 1 MILLIGRAM(S): at 11:00

## 2019-12-11 RX ADMIN — Medication 2: at 07:46

## 2019-12-11 RX ADMIN — Medication 81 MILLIGRAM(S): at 10:59

## 2019-12-11 NOTE — DISCHARGE NOTE PROVIDER - CARE PROVIDERS DIRECT ADDRESSES
,DirectAddress_Unknown,DirectAddress_Unknown,[email or web address]

## 2019-12-11 NOTE — PROGRESS NOTE ADULT - SUBJECTIVE AND OBJECTIVE BOX
74 years old male by ems with cpap c/o respiratory distress. EMS sts pt's blood pressure was very high, received nitro spray x 4 aspirin 162 mg. Pt is alert and oriented but unable to speaking in clear full sentences unable to give detail hx (08 Dec 2019 11:01)      Chief Complaint:  Patient is a 74y old  Male who presents with a chief complaint of sob (08 Dec 2019 20:14)      Review of Systems:    General:  No wt loss, fevers, chills, night sweats  Eyes:  Good vision, no reported pain  ENT:  No sore throat, pain, runny nose, dysphagia  CV:  No pain, palpitations, hypo/hypertension  Resp:  dyspnea, cough, tachypnea, wheezing  GI:  No pain, nausea, vomiting, diarrhea, constipation         Social History/Family History  SOCHX:   tobacco,  -  alcohol    FMHX: FA/MO  - contributory       Discussed with:  PMD, Family    Physical Exam:    Vital Signs:  Vital Signs Last 24 Hrs  T(C): 36.1 (08 Dec 2019 16:51), Max: 37 (08 Dec 2019 08:05)  T(F): 97 (08 Dec 2019 16:51), Max: 98.6 (08 Dec 2019 08:05)  HR: 69 (08 Dec 2019 16:55) (60 - 69)  BP: 134/67 (08 Dec 2019 16:51) (134/67 - 162/77)  BP(mean): --  RR: 18 (08 Dec 2019 16:51) (18 - 33)  SpO2: 98% (08 Dec 2019 16:55) (96% - 100%)  Daily Height in cm: 177.8 (08 Dec 2019 13:38)    Daily   I&O's Summary    08 Dec 2019 07:01  -  08 Dec 2019 21:24  --------------------------------------------------------  IN: 0 mL / OUT: 600 mL / NET: -600 mL          Chest:  decreased bases BL  Cardiovascular:  Regular rhythm, S1, S2, no murmur/rub/S3/S4, no carotid/femoral/abdominal bruit,   Abdomen:  Soft, non-tender, non-distended, normoactive bowel sounds, no HSM      Laboratory:                          13.1   20.94 )-----------( 182      ( 08 Dec 2019 08:20 )             40.7     12-08    141  |  104  |  41<H>  ----------------------------<  306<H>  4.1   |  27  |  1.91<H>    Ca    8.8      08 Dec 2019 09:13    TPro  7.9  /  Alb  3.9  /  TBili  0.6  /  DBili  x   /  AST  65<H>  /  ALT  62  /  AlkPhos  130<H>  12-08    ABG - ( 08 Dec 2019 08:46 )  pH, Arterial: x     pH, Blood: 7.31  /  pCO2: 49    /  pO2: 240   / HCO3: 24    / Base Excess: -1.9  /  SaO2: 99                CARDIAC MARKERS ( 08 Dec 2019 15:58 )  .468 ng/mL / x     / x     / x     / 4.5 ng/mL  CARDIAC MARKERS ( 08 Dec 2019 09:13 )  .167 ng/mL / x     / 94 U/L / x     / 2.6 ng/mL      CAPILLARY BLOOD GLUCOSE      POCT Blood Glucose.: 162 mg/dL (08 Dec 2019 16:14)  POCT Blood Glucose.: 218 mg/dL (08 Dec 2019 12:01)  POCT Blood Glucose.: 320 mg/dL (08 Dec 2019 08:00)    LIVER FUNCTIONS - ( 08 Dec 2019 09:13 )  Alb: 3.9 g/dL / Pro: 7.9 gm/dL / ALK PHOS: 130 U/L / ALT: 62 U/L / AST: 65 U/L / GGT: x           PT/INR - ( 08 Dec 2019 08:20 )   PT: 24.7 sec;   INR: 2.15 ratio         PTT - ( 08 Dec 2019 08:20 )  PTT:30.2 sec      Assessment:  asked to assess this well-known patient with shortness of breath  The patient has a known significant cardiac history with chronic congestive heart failure and an implantable automatic defibrillator  The patient remains on CPAP after a pulmonary consultation  Pulmonary management continues  Diuresis continues  Previous echoes,  one being performed in January 2018 revealed similar findings  A repeat diagnostic echocardiogram is completed and reveals similar findings in previous  Mildly leaked troponin levels and a renal insufficient patient was not likely to be myocardial infarction, but more likely demand ischemia

## 2019-12-11 NOTE — DISCHARGE NOTE PROVIDER - NSDCMRMEDTOKEN_GEN_ALL_CORE_FT
allopurinol 100 mg oral tablet: 2 tab(s) orally once a day  aspirin 81 mg oral tablet: 1 tab(s) orally once a day  cholecalciferol oral tablet: 1000 unit(s) orally once a day  Entresto 24 mg-26 mg oral tablet: 1 tab(s) orally 2 times a day  folic acid 1 mg oral tablet: 1 tab(s) orally once a day  Incruse Ellipta 62.5 mcg/inh inhalation powder: 1 puff(s) inhaled once a day  ipratropium-albuterol 0.5 mg-2.5 mg/3 mLinhalation solution: 3 milliliter(s) inhaled every 6 hours, As Needed SOB wheezing  Januvia 50 mg oral tablet: 1 tab(s) orally once a day  Lasix 40 mg oral tablet: 1 tab(s) orally once a day  Metoprolol Succinate ER 50 mg oral tablet, extended release: 1.5 tab(s) orally once a day  nateglinide 120 mg oral tablet: 1 tab(s) orally 3 times a day (before meals)  ranitidine 150 mg oral tablet: tab(s) orally 2 times a day  senna oral tablet: 2 tab(s) orally once a day (at bedtime)  tamsulosin 0.4 mg oral capsule: 1 cap(s) orally once a day  tiotropium 18 mcg inhalation capsule: 1 cap(s) inhaled once a day  Toujeo SoloStar 300 units/mL subcutaneous solution: 53  subcutaneous once a day (at bedtime)  Trulicity Pen 1.5 mg/0.5 mL subcutaneous solution: once a week  Ventolin HFA 90 mcg/inh inhalation aerosol: 2 puff(s) inhaled every 6 hours, As Needed sob/wheezing  Xarelto 20 mg oral tablet: 1 tab(s) orally once a day (in the evening)  Zocor 40 mg oral tablet: 1 tab(s) orally once a day (at bedtime)

## 2019-12-11 NOTE — DISCHARGE NOTE PROVIDER - HOSPITAL COURSE
74 years old male by ems with cpap c/o respiratory distress. EMS sts pt's blood pressure was very high, received nitro spray x 4 aspirin 162 mg. Pt is alert and oriented but unable to speaking in clear full sentences unable to give detail hx.    Problem: Acute on chronic systolic congestive heart failure.  Plan: cardiology consult called, Lasix.     Problem: Chronic obstructive pulmonary disease, unspecified COPD type.  Plan: Duoneb prn.     Problem: Pneumonia.  Plan: Zosyn, pulmonology consult called.     seen by Dr. Flores (pul)    ASSESSMENT: COPD, CHF, CAD, EMBER    PLAN: cardio eval noted. continue current care. home cpap and nasal cannula o2 in place.    Seen by cardiology : Dr. Medina    Assessment:    asked to assess this well-known patient with shortness of breath    The patient has a known significant cardiac history with chronic congestive heart failure and an implantable automatic defibrillator    The patient remains on CPAP after a pulmonary consultation    Pulmonary management continues    Diuresis continues    Previous echoes,  one being performed in January 2018 revealed similar findings    A repeat diagnostic echocardiogram is completed and reveals similar findings in previous    Mildly leaked troponin levels and a renal insufficient patient was not likely to be myocardial infarction, but more likely demand ischemia    stable for discharge.

## 2019-12-11 NOTE — DISCHARGE NOTE NURSING/CASE MANAGEMENT/SOCIAL WORK - PATIENT PORTAL LINK FT
You can access the FollowMyHealth Patient Portal offered by [facility] by registering at the following website: [email or web address]. By joining [employer]’s FollowMyHealth portal, you will also be able to view your health information using other applications (apps) compatible with our system.

## 2019-12-11 NOTE — CONSULT NOTE ADULT - SUBJECTIVE AND OBJECTIVE BOX
[facility] NEPHROLOGY SERVICES, [facility]  NEPHROLOGY AND HYPERTENSION  [address]  [address]  [place] [address]  [phone]    MD DANIEL GARRETT MD ANDREY GONCHARUK, MD MADHU KORRAPATI, MD YELENA ROSENBERG, MD BINNY KOSHY, MD CHRISTOPHER CAPUTO, MD EDWARD BOVER, MD      Information from chart:  "Patient is a 74y old  Male who presents with a chief complaint of sob (10 Dec 2019 18:37)    HPI:  74 years old male by ems with cpap c/o respiratory distress. EMS sts pt's blood pressure was very high, received nitro spray x 4 aspirin 162 mg. Pt is alert and oriented but unable to speaking in clear full sentences unable to give detail hx (08 Dec 2019 11:01)   "    Patient know to me from my office, seen while making rounds.   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 Left ventricular ejection fraction, by visual estimation, is 55 to   60%.  < from: TTE Echo Doppler w/o Cont (12.09.19 @ 18:40) >  . Estimated pulmonary artery systolic pressure is 55.4 mmHg assuming a   right atrial pressure of 10 mmHg, which is consistent with moderate   pulmonary hypertension.  14. Peak transaortic gradient equals 34.7 mmHg, mean transaortic gradient   equals 18.6 mmHg, the calculated aortic valve area equals 1.15 cm² by the   continuity equation consistent with moderate aortic stenosis.          PAST MEDICAL & SURGICAL HISTORY:  COPD (chronic obstructive pulmonary disease)  Pneumonia  CHF (congestive heart failure)  High cholesterol  DM (diabetes mellitus), type 2  HTN (hypertension)  S/P hernia repair: hernia [id number]  S/P cardiac cath: cardiac stentin   S/P cardiac pacemaker procedure: defibrilator   S/P CABG x 3: cabg3  in     FAMILY HISTORY:  No pertinent family history in first degree relatives    Allergies    No Known Allergies    Intolerances      Home Medications:  allopurinol 100 mg oral tablet: 2 tab(s) orally once a day (08 Dec 2019 11:53)  aspirin 81 mg oral tablet: 1 tab(s) orally once a day (08 Dec 2019 11:53)  Entresto 24 mg-26 mg oral tablet: 1 tab(s) orally 2 times a day (08 Dec 2019 11:53)  Entresto 49 mg-51 mg oral tablet: 1 tab(s) orally 2 times a day (08 Dec 2019 12:09)  folic acid 1 mg oral tablet: 1 tab(s) orally once a day (08 Dec 2019 11:53)  Incruse Ellipta 62.5 mcg/inh inhalation powder: 1 puff(s) inhaled once a day (08 Dec 2019 11:53)  Januvia 50 mg oral tablet: 1 tab(s) orally once a day (08 Dec 2019 12:09)  Lasix 40 mg oral tablet: 1 tab(s) orally once a day (08 Dec 2019 11:53)  Metoprolol Succinate ER 50 mg oral tablet, extended release: 1.5 tab(s) orally once a day (08 Dec 2019 11:53)  nateglinide 120 mg oral tablet: 1 tab(s) orally 3 times a day (before meals) (08 Dec 2019 14:16)  ranitidine 150 mg oral tablet: tab(s) orally 2 times a day (08 Dec 2019 11:53)  tamsulosin 0.4 mg oral capsule: 1 cap(s) orally once a day (08 Dec 2019 11:53)  Toujeo SoloStar 300 units/mL subcutaneous solution: 53  subcutaneous once a day (at bedtime) (08 Dec 2019 11:53)  Trulicity Pen 1.5 mg/0.5 mL subcutaneous solution: once a week (08 Dec 2019 14:17)  Xarelto 20 mg oral tablet: 1 tab(s) orally once a day (in the evening) (08 Dec 2019 11:53)  Zocor 40 mg oral tablet: 1 tab(s) orally once a day (at bedtime) (08 Dec 2019 11:53)    MEDICATIONS  (STANDING):  allopurinol 100 milliGRAM(s) Oral daily  aspirin enteric coated 81 milliGRAM(s) Oral daily  cholecalciferol 1000 Unit(s) Oral daily  dextrose 5%. 1000 milliLiter(s) (50 mL/Hr) IV Continuous <Continuous>  dextrose 50% Injectable 12.5 Gram(s) IV Push once  dextrose 50% Injectable 25 Gram(s) IV Push once  dextrose 50% Injectable 25 Gram(s) IV Push once  folic acid 1 milliGRAM(s) Oral daily  furosemide   Injectable 40 milliGRAM(s) IV Push daily  insulin lispro (HumaLOG) corrective regimen sliding scale   SubCutaneous three times a day before meals  metoprolol succinate ER 50 milliGRAM(s) Oral daily  rivaroxaban 15 milliGRAM(s) Oral with dinner  sacubitril 24 mG/valsartan 26 mG 1 Tablet(s) Oral two times a day  senna 2 Tablet(s) Oral at bedtime  simvastatin 40 milliGRAM(s) Oral at bedtime  tamsulosin 0.4 milliGRAM(s) Oral at bedtime  tiotropium 18 MICROgram(s) Capsule 1 Capsule(s) Inhalation daily    MEDICATIONS  (PRN):  dextrose 40% Gel 15 Gram(s) Oral once PRN Blood Glucose LESS THAN 70 milliGRAM(s)/deciliter  glucagon  Injectable 1 milliGRAM(s) IntraMuscular once PRN Glucose LESS THAN 70 milligrams/deciliter    Vital Signs Last 24 Hrs  T(C): 35.9 (11 Dec 2019 05:46), Max: 36.7 (10 Dec 2019 17:30)  T(F): 96.7 (11 Dec 2019 05:46), Max: 98.1 (10 Dec 2019 17:30)  HR: 60 (11 Dec 2019 08:15) (60 - 72)  BP: 133/71 (11 Dec 2019 05:46) (128/59 - 133/71)  BP(mean): --  RR: 18 (11 Dec 2019 05:46) (17 - 20)  SpO2: 98% (11 Dec 2019 08:15) (94% - 99%)    Daily     Daily Weight in k.6 (11 Dec 2019 05:46)    12-10-19 @ 07:01  -  19 @ 07:00  --------------------------------------------------------  IN: 120 mL / OUT: 0 mL / NET: 120 mL      CAPILLARY BLOOD GLUCOSE      POCT Blood Glucose.: 254 mg/dL (11 Dec 2019 10:52)  POCT Blood Glucose.: 167 mg/dL (11 Dec 2019 07:37)  POCT Blood Glucose.: 169 mg/dL (10 Dec 2019 21:33)  POCT Blood Glucose.: 148 mg/dL (10 Dec 2019 16:01)  POCT Blood Glucose.: 221 mg/dL (10 Dec 2019 12:17)    PHYSICAL EXAM:      T(C): 35.9 (19 @ 05:46), Max: 36.7 (12-10-19 @ 17:30)  HR: 60 (19 @ 08:15) (60 - 72)  BP: 133/71 (19 @ 05:46) (128/59 - 133/71)  RR: 18 (19 @ 05:46) (17 - 20)  SpO2: 98% (19 @ 08:15) (94% - 99%)  Wt(kg): --  Respiratory: mild rhonchi  anteriorly, decreased BS at bases with scant crackles;   Cardiovascular: S1 S2  Gastrointestinal: soft NT ND +BS  Extremities:   1 edema              12-10    142  |  107  |  35<H>  ----------------------------<  139<H>  4.1   |  30  |  1.62<H>    Ca    9.1      10 Dec 2019 06:40                            11.5   10.13 )-----------( 148      ( 10 Dec 2019 06:40 )             37.1     Creatinine Trend: 1.62<--, 1.78<--, 1.91<--          Assessment   DENISHA CKD 3-4; CHF pEF with moderate AS;     Plan  Warranted diuresis;   Continue sacubitril 24 mG/valsartan 26 mG 1 Tablet(s) Oral two times a day  Furosemide   Injectable 40 milliGRAM(s) IV Push daily  Will follow course.    Jon Rowe MD

## 2019-12-11 NOTE — DISCHARGE NOTE NURSING/CASE MANAGEMENT/SOCIAL WORK - NSDCPEXARELTO_GEN_ALL_CORE
Rivaroxaban/Xarelto - Compliance/Rivaroxaban/Xarelto - Follow up monitoring/Rivaroxaban/Xarelto - Potential for adverse drug reactions and interactions/Rivaroxaban/Xarelto - Dietary Advice

## 2019-12-11 NOTE — DISCHARGE NOTE PROVIDER - CARE PROVIDER_API CALL
William Kebede (DO)  Internal Medicine  [address] [place] [address]  Phone: [phone]  Fax: [phone]  Follow Up Time: 1 week    Pietro Flores)  Internal Medicine  [address] [place] [address]  Phone: [phone]  Fax: [phone]  Follow Up Time: 2 weeks    Sameer Montgomery)  Cardiology  [address] [facility], [place] [address]  Phone: [phone]  Fax: [phone]  Follow Up Time: 2 weeks

## 2019-12-13 DIAGNOSIS — E11.22 TYPE 2 DIABETES MELLITUS WITH DIABETIC CHRONIC KIDNEY DISEASE: ICD-10-CM

## 2019-12-13 DIAGNOSIS — I50.23 ACUTE ON CHRONIC SYSTOLIC (CONGESTIVE) HEART FAILURE: ICD-10-CM

## 2019-12-13 DIAGNOSIS — I16.9 HYPERTENSIVE CRISIS, UNSPECIFIED: ICD-10-CM

## 2019-12-13 DIAGNOSIS — G47.33 OBSTRUCTIVE SLEEP APNEA (ADULT) (PEDIATRIC): ICD-10-CM

## 2019-12-13 DIAGNOSIS — R06.02 SHORTNESS OF BREATH: ICD-10-CM

## 2019-12-13 DIAGNOSIS — N18.4 CHRONIC KIDNEY DISEASE, STAGE 4 (SEVERE): ICD-10-CM

## 2019-12-13 DIAGNOSIS — Z95.0 PRESENCE OF CARDIAC PACEMAKER: ICD-10-CM

## 2019-12-13 DIAGNOSIS — Z95.1 PRESENCE OF AORTOCORONARY BYPASS GRAFT: ICD-10-CM

## 2019-12-13 DIAGNOSIS — I13.0 HYPERTENSIVE HEART AND CHRONIC KIDNEY DISEASE WITH HEART FAILURE AND STAGE 1 THROUGH STAGE 4 CHRONIC KIDNEY DISEASE, OR UNSPECIFIED CHRONIC KIDNEY DISEASE: ICD-10-CM

## 2019-12-13 DIAGNOSIS — Z79.52 LONG TERM (CURRENT) USE OF SYSTEMIC STEROIDS: ICD-10-CM

## 2019-12-13 DIAGNOSIS — E78.5 HYPERLIPIDEMIA, UNSPECIFIED: ICD-10-CM

## 2019-12-13 DIAGNOSIS — I25.10 ATHEROSCLEROTIC HEART DISEASE OF NATIVE CORONARY ARTERY WITHOUT ANGINA PECTORIS: ICD-10-CM

## 2019-12-13 DIAGNOSIS — J44.9 CHRONIC OBSTRUCTIVE PULMONARY DISEASE, UNSPECIFIED: ICD-10-CM

## 2019-12-13 DIAGNOSIS — I24.8 OTHER FORMS OF ACUTE ISCHEMIC HEART DISEASE: ICD-10-CM

## 2019-12-13 DIAGNOSIS — Z79.01 LONG TERM (CURRENT) USE OF ANTICOAGULANTS: ICD-10-CM

## 2019-12-13 DIAGNOSIS — Z95.5 PRESENCE OF CORONARY ANGIOPLASTY IMPLANT AND GRAFT: ICD-10-CM

## 2019-12-13 DIAGNOSIS — I27.20 PULMONARY HYPERTENSION, UNSPECIFIED: ICD-10-CM

## 2019-12-13 DIAGNOSIS — Z87.891 PERSONAL HISTORY OF NICOTINE DEPENDENCE: ICD-10-CM

## 2019-12-13 DIAGNOSIS — Z79.82 LONG TERM (CURRENT) USE OF ASPIRIN: ICD-10-CM

## 2019-12-13 LAB
CULTURE RESULTS: SIGNIFICANT CHANGE UP
CULTURE RESULTS: SIGNIFICANT CHANGE UP
SPECIMEN SOURCE: SIGNIFICANT CHANGE UP
SPECIMEN SOURCE: SIGNIFICANT CHANGE UP

## 2021-03-13 ENCOUNTER — INPATIENT (INPATIENT)
Facility: HOSPITAL | Age: 76
LOS: 5 days | Discharge: HOME | End: 2021-03-19
Attending: INTERNAL MEDICINE | Admitting: INTERNAL MEDICINE
Payer: MEDICARE

## 2021-03-13 VITALS
OXYGEN SATURATION: 93 % | RESPIRATION RATE: 12 BRPM | SYSTOLIC BLOOD PRESSURE: 159 MMHG | WEIGHT: 244.93 LBS | DIASTOLIC BLOOD PRESSURE: 71 MMHG | HEART RATE: 69 BPM | TEMPERATURE: 99 F | HEIGHT: 70 IN

## 2021-03-13 DIAGNOSIS — Z95.1 PRESENCE OF AORTOCORONARY BYPASS GRAFT: Chronic | ICD-10-CM

## 2021-03-13 DIAGNOSIS — Z95.0 PRESENCE OF CARDIAC PACEMAKER: Chronic | ICD-10-CM

## 2021-03-13 DIAGNOSIS — I25.10 ATHEROSCLEROTIC HEART DISEASE OF NATIVE CORONARY ARTERY WITHOUT ANGINA PECTORIS: ICD-10-CM

## 2021-03-13 DIAGNOSIS — J96.01 ACUTE RESPIRATORY FAILURE WITH HYPOXIA: ICD-10-CM

## 2021-03-13 DIAGNOSIS — Z98.89 OTHER SPECIFIED POSTPROCEDURAL STATES: Chronic | ICD-10-CM

## 2021-03-13 DIAGNOSIS — I50.31 ACUTE DIASTOLIC (CONGESTIVE) HEART FAILURE: ICD-10-CM

## 2021-03-13 DIAGNOSIS — J44.1 CHRONIC OBSTRUCTIVE PULMONARY DISEASE WITH (ACUTE) EXACERBATION: ICD-10-CM

## 2021-03-13 LAB
ALBUMIN SERPL ELPH-MCNC: 3.7 G/DL — SIGNIFICANT CHANGE UP (ref 3.3–5)
ALP SERPL-CCNC: 107 U/L — SIGNIFICANT CHANGE UP (ref 40–120)
ALT FLD-CCNC: 73 U/L — SIGNIFICANT CHANGE UP (ref 12–78)
ANION GAP SERPL CALC-SCNC: 9 MMOL/L — SIGNIFICANT CHANGE UP (ref 5–17)
APPEARANCE UR: CLEAR — SIGNIFICANT CHANGE UP
APTT BLD: 24.1 SEC — LOW (ref 27.5–35.5)
AST SERPL-CCNC: 108 U/L — HIGH (ref 15–37)
BASE EXCESS BLDA CALC-SCNC: 1.3 MMOL/L — SIGNIFICANT CHANGE UP (ref -2–2)
BASOPHILS # BLD AUTO: 0.03 K/UL — SIGNIFICANT CHANGE UP (ref 0–0.2)
BASOPHILS NFR BLD AUTO: 0.2 % — SIGNIFICANT CHANGE UP (ref 0–2)
BILIRUB SERPL-MCNC: 0.8 MG/DL — SIGNIFICANT CHANGE UP (ref 0.2–1.2)
BILIRUB UR-MCNC: NEGATIVE — SIGNIFICANT CHANGE UP
BLOOD GAS COMMENTS: SIGNIFICANT CHANGE UP
BLOOD GAS COMMENTS: SIGNIFICANT CHANGE UP
BLOOD GAS SOURCE: SIGNIFICANT CHANGE UP
BUN SERPL-MCNC: 37 MG/DL — HIGH (ref 7–23)
CALCIUM SERPL-MCNC: 8.5 MG/DL — SIGNIFICANT CHANGE UP (ref 8.5–10.1)
CHLORIDE SERPL-SCNC: 102 MMOL/L — SIGNIFICANT CHANGE UP (ref 96–108)
CK SERPL-CCNC: 225 U/L — SIGNIFICANT CHANGE UP (ref 26–308)
CO2 SERPL-SCNC: 26 MMOL/L — SIGNIFICANT CHANGE UP (ref 22–31)
COLOR SPEC: YELLOW — SIGNIFICANT CHANGE UP
CREAT SERPL-MCNC: 2.22 MG/DL — HIGH (ref 0.5–1.3)
DIFF PNL FLD: ABNORMAL
EOSINOPHIL # BLD AUTO: 0.03 K/UL — SIGNIFICANT CHANGE UP (ref 0–0.5)
EOSINOPHIL NFR BLD AUTO: 0.2 % — SIGNIFICANT CHANGE UP (ref 0–6)
GLUCOSE BLDC GLUCOMTR-MCNC: 325 MG/DL — HIGH (ref 70–99)
GLUCOSE SERPL-MCNC: 355 MG/DL — HIGH (ref 70–99)
GLUCOSE UR QL: 1000 MG/DL
HCO3 BLDA-SCNC: 26 MMOL/L — SIGNIFICANT CHANGE UP (ref 21–29)
HCT VFR BLD CALC: 43.6 % — SIGNIFICANT CHANGE UP (ref 39–50)
HGB BLD-MCNC: 13.7 G/DL — SIGNIFICANT CHANGE UP (ref 13–17)
HOROWITZ INDEX BLDA+IHG-RTO: 40 — SIGNIFICANT CHANGE UP
IMM GRANULOCYTES NFR BLD AUTO: 0.6 % — SIGNIFICANT CHANGE UP (ref 0–1.5)
INR BLD: 1.18 RATIO — HIGH (ref 0.88–1.16)
KETONES UR-MCNC: NEGATIVE — SIGNIFICANT CHANGE UP
LACTATE SERPL-SCNC: 2 MMOL/L — SIGNIFICANT CHANGE UP (ref 0.7–2)
LEUKOCYTE ESTERASE UR-ACNC: NEGATIVE — SIGNIFICANT CHANGE UP
LYMPHOCYTES # BLD AUTO: 0.51 K/UL — LOW (ref 1–3.3)
LYMPHOCYTES # BLD AUTO: 3.5 % — LOW (ref 13–44)
MCHC RBC-ENTMCNC: 29.9 PG — SIGNIFICANT CHANGE UP (ref 27–34)
MCHC RBC-ENTMCNC: 31.4 GM/DL — LOW (ref 32–36)
MCV RBC AUTO: 95.2 FL — SIGNIFICANT CHANGE UP (ref 80–100)
MONOCYTES # BLD AUTO: 0.53 K/UL — SIGNIFICANT CHANGE UP (ref 0–0.9)
MONOCYTES NFR BLD AUTO: 3.6 % — SIGNIFICANT CHANGE UP (ref 2–14)
NEUTROPHILS # BLD AUTO: 13.36 K/UL — HIGH (ref 1.8–7.4)
NEUTROPHILS NFR BLD AUTO: 91.9 % — HIGH (ref 43–77)
NITRITE UR-MCNC: NEGATIVE — SIGNIFICANT CHANGE UP
NRBC # BLD: 0 /100 WBCS — SIGNIFICANT CHANGE UP (ref 0–0)
NT-PROBNP SERPL-SCNC: 2512 PG/ML — HIGH (ref 0–450)
PCO2 BLDA: 44 MMHG — SIGNIFICANT CHANGE UP (ref 32–46)
PH BLD: 7.39 — SIGNIFICANT CHANGE UP (ref 7.35–7.45)
PH UR: 5 — SIGNIFICANT CHANGE UP (ref 5–8)
PLATELET # BLD AUTO: 160 K/UL — SIGNIFICANT CHANGE UP (ref 150–400)
PO2 BLDA: 91 MMHG — SIGNIFICANT CHANGE UP (ref 74–108)
POTASSIUM SERPL-MCNC: 4.6 MMOL/L — SIGNIFICANT CHANGE UP (ref 3.5–5.3)
POTASSIUM SERPL-SCNC: 4.6 MMOL/L — SIGNIFICANT CHANGE UP (ref 3.5–5.3)
PROT SERPL-MCNC: 7.5 GM/DL — SIGNIFICANT CHANGE UP (ref 6–8.3)
PROT UR-MCNC: 100 MG/DL
PROTHROM AB SERPL-ACNC: 13.6 SEC — SIGNIFICANT CHANGE UP (ref 10.6–13.6)
RAPID RVP RESULT: SIGNIFICANT CHANGE UP
RBC # BLD: 4.58 M/UL — SIGNIFICANT CHANGE UP (ref 4.2–5.8)
RBC # FLD: 15.9 % — HIGH (ref 10.3–14.5)
SAO2 % BLDA: 97 % — HIGH (ref 92–96)
SARS-COV-2 RNA SPEC QL NAA+PROBE: SIGNIFICANT CHANGE UP
SODIUM SERPL-SCNC: 137 MMOL/L — SIGNIFICANT CHANGE UP (ref 135–145)
SP GR SPEC: 1.01 — SIGNIFICANT CHANGE UP (ref 1.01–1.02)
TROPONIN I SERPL-MCNC: 0.3 NG/ML — HIGH (ref 0.01–0.04)
UROBILINOGEN FLD QL: NEGATIVE MG/DL — SIGNIFICANT CHANGE UP
WBC # BLD: 14.54 K/UL — HIGH (ref 3.8–10.5)
WBC # FLD AUTO: 14.54 K/UL — HIGH (ref 3.8–10.5)

## 2021-03-13 PROCEDURE — 93010 ELECTROCARDIOGRAM REPORT: CPT

## 2021-03-13 PROCEDURE — 99285 EMERGENCY DEPT VISIT HI MDM: CPT

## 2021-03-13 PROCEDURE — 71045 X-RAY EXAM CHEST 1 VIEW: CPT | Mod: 26

## 2021-03-13 RX ORDER — DEXTROSE 50 % IN WATER 50 %
12.5 SYRINGE (ML) INTRAVENOUS ONCE
Refills: 0 | Status: DISCONTINUED | OUTPATIENT
Start: 2021-03-13 | End: 2021-03-19

## 2021-03-13 RX ORDER — INSULIN LISPRO 100/ML
VIAL (ML) SUBCUTANEOUS AT BEDTIME
Refills: 0 | Status: DISCONTINUED | OUTPATIENT
Start: 2021-03-13 | End: 2021-03-19

## 2021-03-13 RX ORDER — ASPIRIN/CALCIUM CARB/MAGNESIUM 324 MG
81 TABLET ORAL DAILY
Refills: 0 | Status: DISCONTINUED | OUTPATIENT
Start: 2021-03-13 | End: 2021-03-19

## 2021-03-13 RX ORDER — SIMVASTATIN 20 MG/1
40 TABLET, FILM COATED ORAL AT BEDTIME
Refills: 0 | Status: DISCONTINUED | OUTPATIENT
Start: 2021-03-13 | End: 2021-03-19

## 2021-03-13 RX ORDER — RIVAROXABAN 15 MG-20MG
20 KIT ORAL DAILY
Refills: 0 | Status: DISCONTINUED | OUTPATIENT
Start: 2021-03-13 | End: 2021-03-15

## 2021-03-13 RX ORDER — TIOTROPIUM BROMIDE 18 UG/1
1 CAPSULE ORAL; RESPIRATORY (INHALATION) DAILY
Refills: 0 | Status: DISCONTINUED | OUTPATIENT
Start: 2021-03-13 | End: 2021-03-19

## 2021-03-13 RX ORDER — UMECLIDINIUM 62.5 UG/1
1 AEROSOL, POWDER ORAL
Qty: 0 | Refills: 0 | DISCHARGE

## 2021-03-13 RX ORDER — SODIUM CHLORIDE 9 MG/ML
1000 INJECTION, SOLUTION INTRAVENOUS
Refills: 0 | Status: DISCONTINUED | OUTPATIENT
Start: 2021-03-13 | End: 2021-03-19

## 2021-03-13 RX ORDER — ALBUTEROL 90 UG/1
1 AEROSOL, METERED ORAL EVERY 4 HOURS
Refills: 0 | Status: DISCONTINUED | OUTPATIENT
Start: 2021-03-13 | End: 2021-03-19

## 2021-03-13 RX ORDER — DEXTROSE 50 % IN WATER 50 %
15 SYRINGE (ML) INTRAVENOUS ONCE
Refills: 0 | Status: DISCONTINUED | OUTPATIENT
Start: 2021-03-13 | End: 2021-03-19

## 2021-03-13 RX ORDER — FUROSEMIDE 40 MG
40 TABLET ORAL ONCE
Refills: 0 | Status: COMPLETED | OUTPATIENT
Start: 2021-03-13 | End: 2021-03-13

## 2021-03-13 RX ORDER — ALLOPURINOL 300 MG
200 TABLET ORAL DAILY
Refills: 0 | Status: DISCONTINUED | OUTPATIENT
Start: 2021-03-13 | End: 2021-03-19

## 2021-03-13 RX ORDER — METOPROLOL TARTRATE 50 MG
50 TABLET ORAL DAILY
Refills: 0 | Status: DISCONTINUED | OUTPATIENT
Start: 2021-03-13 | End: 2021-03-15

## 2021-03-13 RX ORDER — CEFTRIAXONE 500 MG/1
INJECTION, POWDER, FOR SOLUTION INTRAMUSCULAR; INTRAVENOUS
Refills: 0 | Status: COMPLETED | OUTPATIENT
Start: 2021-03-13 | End: 2021-03-19

## 2021-03-13 RX ORDER — DEXTROSE 50 % IN WATER 50 %
25 SYRINGE (ML) INTRAVENOUS ONCE
Refills: 0 | Status: DISCONTINUED | OUTPATIENT
Start: 2021-03-13 | End: 2021-03-19

## 2021-03-13 RX ORDER — PANTOPRAZOLE SODIUM 20 MG/1
40 TABLET, DELAYED RELEASE ORAL
Refills: 0 | Status: DISCONTINUED | OUTPATIENT
Start: 2021-03-13 | End: 2021-03-19

## 2021-03-13 RX ORDER — CEFTRIAXONE 500 MG/1
1000 INJECTION, POWDER, FOR SOLUTION INTRAMUSCULAR; INTRAVENOUS EVERY 24 HOURS
Refills: 0 | Status: COMPLETED | OUTPATIENT
Start: 2021-03-14 | End: 2021-03-18

## 2021-03-13 RX ORDER — CEFTRIAXONE 500 MG/1
1000 INJECTION, POWDER, FOR SOLUTION INTRAMUSCULAR; INTRAVENOUS ONCE
Refills: 0 | Status: COMPLETED | OUTPATIENT
Start: 2021-03-13 | End: 2021-03-13

## 2021-03-13 RX ORDER — GLUCAGON INJECTION, SOLUTION 0.5 MG/.1ML
1 INJECTION, SOLUTION SUBCUTANEOUS ONCE
Refills: 0 | Status: DISCONTINUED | OUTPATIENT
Start: 2021-03-13 | End: 2021-03-19

## 2021-03-13 RX ORDER — FOLIC ACID 0.8 MG
1 TABLET ORAL DAILY
Refills: 0 | Status: DISCONTINUED | OUTPATIENT
Start: 2021-03-13 | End: 2021-03-19

## 2021-03-13 RX ORDER — INSULIN LISPRO 100/ML
VIAL (ML) SUBCUTANEOUS
Refills: 0 | Status: DISCONTINUED | OUTPATIENT
Start: 2021-03-13 | End: 2021-03-19

## 2021-03-13 RX ORDER — DULAGLUTIDE 4.5 MG/.5ML
0 INJECTION, SOLUTION SUBCUTANEOUS
Qty: 0 | Refills: 0 | DISCHARGE

## 2021-03-13 RX ORDER — TAMSULOSIN HYDROCHLORIDE 0.4 MG/1
0.4 CAPSULE ORAL AT BEDTIME
Refills: 0 | Status: DISCONTINUED | OUTPATIENT
Start: 2021-03-13 | End: 2021-03-19

## 2021-03-13 RX ADMIN — Medication 125 MILLIGRAM(S): at 18:11

## 2021-03-13 RX ADMIN — Medication 40 MILLIGRAM(S): at 18:12

## 2021-03-13 RX ADMIN — TAMSULOSIN HYDROCHLORIDE 0.4 MILLIGRAM(S): 0.4 CAPSULE ORAL at 23:02

## 2021-03-13 RX ADMIN — SIMVASTATIN 40 MILLIGRAM(S): 20 TABLET, FILM COATED ORAL at 23:02

## 2021-03-13 RX ADMIN — CEFTRIAXONE 100 MILLIGRAM(S): 500 INJECTION, POWDER, FOR SOLUTION INTRAMUSCULAR; INTRAVENOUS at 18:14

## 2021-03-13 RX ADMIN — Medication 40 MILLIGRAM(S): at 23:02

## 2021-03-13 NOTE — ED ADULT NURSE NOTE - OBJECTIVE STATEMENT
received er bed 15 biba from home c/o increased shortness of breath x 90 mins hx copd uses 2l n/c prn bipap initiated per ems prior to arrival with sat=92% on o2 15l/min via bipap states baseline sat is 90-93% on 2 l/min denies chest pain denies fever/chills/cough had 1st dose covid vaccine on tuesday hx chf, ppm/aicd/copd b/l rhonchi audible no wheezing audible at present denies hx prior intubation speaks full sentences with some difficulty noted tolerating bipap and states respiratory effort improved with use received er bed 15 biba from home c/o increased shortness of breath x 90 mins hx copd uses 2l n/c prn portable bipap initiated per ems prior to arrival with sat=92% on o2 15l/min via bipap states baseline sat is 90-93% on 2 l/min denies chest pain denies fever/chills/cough had 1st dose covid vaccine on tuesday hx chf, ppm/aicd/copd b/l rhonchi audible no wheezing audible at present denies hx prior intubation speaks full sentences with some difficulty noted tolerating bipap and states respiratory effort improved with use

## 2021-03-13 NOTE — ED ADULT NURSE NOTE - NSIMPLEMENTINTERV_GEN_ALL_ED
Implemented All Fall with Harm Risk Interventions:  [place] to call system. Call bell, personal items and telephone within reach. Instruct patient to call for assistance. Room bathroom lighting operational. Non-slip footwear when patient is off stretcher. Physically safe environment: no spills, clutter or unnecessary equipment. Stretcher in lowest position, wheels locked, appropriate side rails in place. Provide visual cue, wrist band, yellow gown, etc. Monitor gait and stability. Monitor for mental status changes and reorient to person, place, and time. Review medications for side effects contributing to fall risk. Reinforce activity limits and safety measures with patient and family. Provide visual clues: red socks.

## 2021-03-13 NOTE — ED PROVIDER NOTE - CLINICAL SUMMARY MEDICAL DECISION MAKING FREE TEXT BOX
pt presented with acute onset of sob, found to be hypoxic in the field, given dexamethasone and albuterol and placed on cpap with improvement, pt placed on bipap in the ER, chest xray shows a congestive pattern +PPM

## 2021-03-13 NOTE — H&P ADULT - NSICDXPASTSURGICALHX_GEN_ALL_CORE_FT
PAST SURGICAL HISTORY:  S/P CABG x 3 cabg3  in 2003    S/P cardiac cath cardiac stentin 2011    S/P cardiac pacemaker procedure defibrilator 2012    S/P hernia repair hernia [id number]

## 2021-03-13 NOTE — H&P ADULT - NSHPPHYSICALEXAM_GEN_ALL_CORE
PHYSICAL EXAM:  GENERAL: NAD, well-groomed, well-developed  HEAD:  Atraumatic, Normocephalic  EYES: PERRLA, conjunctiva and sclera clear  ENMT: Intact  NECK: Supple,   NERVOUS SYSTEM:  Alert & Oriented X3; Motor Strength 5/5   CHEST/LUNG: BS decreased bilaterally; No rales, rhonchi, wheezing, or rubs  HEART: Regular rate and rhythm; No murmurs, rubs, or gallops  ABDOMEN: Soft, Nontender, Nondistended; Bowel sounds present  EXTREMITIES:  No clubbing, cyanosis.    Trace edema  SKIN: No rashes or lesions

## 2021-03-13 NOTE — ED PROVIDER NOTE - PSH
S/P CABG x 3  cabg3  in 2003  S/P cardiac cath  cardiac stentin 2011  S/P cardiac pacemaker procedure  defibrilator 2012  S/P hernia repair  hernia [id number]

## 2021-03-13 NOTE — ED ADULT TRIAGE NOTE - CCCP TRG CHIEF CMPLNT
Vaccine Information Statement(s) for was given today. This has been reviewed, questions answered, and verbal consent given by Patient for injection(s) and administration of Influenza (Inactivated).    1. Does the patient have a moderate to severe fever?  No  2. Has the patient had a serious reaction to a flu shot before?   No  3. Has the patient ever had Guillian [place] Syndrome within 6 weeks of a previous flu shot?  No  4. Does the patient have a serious allergy to eggs?  No  5. Is the patient less that 6 months of age?  No    Patient is eligible to receive the vaccine based on all questions being answered as 'No'.        Patient tolerated without incident. See immunization grid for documentation.     shortness of breath

## 2021-03-13 NOTE — H&P ADULT - NSHPLABSRESULTS_GEN_ALL_CORE
LABS:                        13.7   14.54 )-----------( 160      ( 13 Mar 2021 15:46 )             43.6     03-13    137  |  102  |  37<H>  ----------------------------<  355<H>  4.6   |  26  |  2.22<H>    Ca    8.5      13 Mar 2021 15:46    TPro  7.5  /  Alb  3.7  /  TBili  0.8  /  DBili  x   /  AST  108<H>  /  ALT  73  /  AlkPhos  107  03-13    PT/INR - ( 13 Mar 2021 15:46 )   PT: 13.6 sec;   INR: 1.18 ratio         PTT - ( 13 Mar 2021 15:46 )  PTT:24.1 sec    CAPILLARY BLOOD GLUCOSE        Blood Gas Source: Arterial   Blood Gas Comments: radial artery punctured. site held. no bleeding. no hematoma.   pH, Blood: 7.39   pCO2, Arterial: 44 mmHg   pO2, Arterial: 91 mmHg   HCO3, Arterial: 26 mmol/L   Base Excess, Arterial: 1.3 mmol/L   Oxygen Saturation, Arterial: 97 %   FIO2, Arterial: 40       Serum Pro-Brain Natriuretic Peptide: 2512 pg/mL (03.13.21 @ 15:46)   Lactate, Blood: 2.0 mmol/L (03.13.21 @ 15:46)   `    < from: TTE Echo Doppler w/o Cont (12.09.19 @ 18:40) >    Summary:   1. Left ventricular ejection fraction, by visual estimation, is 55 to   60%.   2. Technically limited study.   3. Normal left ventricular internal cavity size.   4. Normal right ventricular size and function.   5. The right atrium is normal in size.   6. There is no evidence of pericardial effusion.   7. Mild mitral valve regurgitation.   8. Structurally normal mitral valve, with normal leaflet excursion.   9. Mild-moderate tricuspid regurgitation.  10. Structurally normal tricuspidvalve, with normal leaflet excursion.  11. Moderate aortic regurgitation.  12. Structurally normal pulmonic valve, with normal leaflet excursion.  13. Estimated pulmonary artery systolic pressure is 55.4 mmHg assuming a   right atrial pressure of 10 mmHg, which is consistent with moderate   pulmonary hypertension.  14. Peak transaortic gradient equals 34.7 mmHg, mean transaortic gradient   equals 18.6 mmHg, the calculated aortic valve area equals 1.15 cm² by the   continuity equation consistent with moderate aortic stenosis.    < end of copied text >        `

## 2021-03-13 NOTE — ED ADULT TRIAGE NOTE - CHIEF COMPLAINT QUOTE
brought by ems for copd exacerbation . pt is c/o shortness of breath. h/o CHF, HTN, DM, . pt 's room air oxygen was 69% and ems placed her on bipap and gave one albuterol and 12mg of dexamethasone .

## 2021-03-13 NOTE — H&P ADULT - HISTORY OF PRESENT ILLNESS
74yo, BM with h/o HTN, DM, COPD (on home o2 2L), CHF, PPM/AICD presents today biba from home with sudden onset of sob, states that he was watching television when his symptoms occurred, shortly before he did ambulate to the bathroom +chest tightness (-) fevers or chills (-) cough (-) flu like symptoms, (-) leg edema. Notes CP.   In the field pt's o2 saturation was 69%, he was given dexamethasone and albuterol and placed on cpap with improvement

## 2021-03-13 NOTE — H&P ADULT - NSICDXPASTMEDICALHX_GEN_ALL_CORE_FT
PAST MEDICAL HISTORY:  AICD (automatic cardioverter/defibrillator) present     CHF (congestive heart failure)     COPD (chronic obstructive pulmonary disease)     DM (diabetes mellitus), type 2     High cholesterol     HTN (hypertension)     Pacemaker     Pneumonia

## 2021-03-13 NOTE — ED ADULT NURSE NOTE - PMH
AICD (automatic cardioverter/defibrillator) present    CHF (congestive heart failure)    COPD (chronic obstructive pulmonary disease)    DM (diabetes mellitus), type 2    High cholesterol    HTN (hypertension)    Pacemaker    Pneumonia

## 2021-03-13 NOTE — ED PROVIDER NOTE - CONSTITUTIONAL, MLM
normal... Well appearing, obese male, awake, alert, oriented to person, place, time/situation, currently on cpap from EMS

## 2021-03-13 NOTE — ED PROVIDER NOTE - OBJECTIVE STATEMENT
75 year male with h/o HTN, DM, COPD (on home o2 2L), CHF, PPM/AICD 75 year male with h/o HTN, DM, COPD (on home o2 2L), CHF, PPM/AICD presents today biba from home with sudden onset of sob, pt states that he was watching television when his symptoms occurred, shortly before he did ambulate to the bathroom +chest tightness (-) fevers or chills (-) cough (-) flu like symptoms, (-) leg edema pt did receive his first maderna vaccine on 3/5/21, in the field pt's o2 saturation was 69%, he was given dexamethasone and albuterol and placed on cpap with improvement

## 2021-03-13 NOTE — CONSULT NOTE ADULT - SUBJECTIVE AND OBJECTIVE BOX
Patient is a 75y old  Male who presents with a chief complaint of Shortness of breath (13 Mar 2021 18:29)    HPI:  76yo, BM with HTN, HLD, DM, CAD with CABG+ stents, Diastolic CHF, S/P AICD, COPD (on home o2 2L), Obesity, EMBER on CPAP,  Gout.   Presented  from home with sudden onset of sob, states that he was watching television when his symptoms occurred, shortly before he did ambulate to the bathroom +chest tightness (-) fevers or chills (-) cough (-) flu like symptoms, (-) leg edema. Notes CP.     In the field pt's O2 saturation was 69%, he was given dexamethasone and albuterol and placed on CPAP with improvement. Smoked close to 40 years, quit 20 years ago    PAST MEDICAL & SURGICAL HISTORY:  AICD (automatic cardioverter/defibrillator) present    Pacemaker    COPD (chronic obstructive pulmonary disease)    Pneumonia    CHF (congestive heart failure)    High cholesterol    DM (diabetes mellitus), type 2    HTN (hypertension)    S/P hernia repair  hernia [id number]    S/P cardiac cath  cardiac stentin 2011    S/P cardiac pacemaker procedure  defibrilator 2012    S/P CABG x 3  cabg3  in 2003    FAMILY HISTORY:  No pertinent family history in first degree relatives    SOCIAL HISTORY: BMI (kg/m2): 35.1 (03-13 @ 14:47).  ex smoker    Allergies  No Known Allergies    MEDICATIONS  (STANDING):  ALBUTerol    90 MICROgram(s) HFA Inhaler 1 Puff(s) Inhalation every 4 hours  allopurinol 200 milliGRAM(s) Oral daily  aspirin enteric coated 81 milliGRAM(s) Oral daily  cefTRIAXone   IVPB      dextrose 40% Gel 15 Gram(s) Oral once  dextrose 5%. 1000 milliLiter(s) (50 mL/Hr) IV Continuous <Continuous>  dextrose 50% Injectable 25 Gram(s) IV Push once  dextrose 50% Injectable 12.5 Gram(s) IV Push once  dextrose 50% Injectable 25 Gram(s) IV Push once  folic acid 1 milliGRAM(s) Oral daily  glucagon  Injectable 1 milliGRAM(s) IntraMuscular once  insulin lispro (ADMELOG) corrective regimen sliding scale   SubCutaneous three times a day before meals  insulin lispro (ADMELOG) corrective regimen sliding scale   SubCutaneous at bedtime  methylPREDNISolone sodium succinate Injectable 40 milliGRAM(s) IV Push every 8 hours  metoprolol succinate ER 50 milliGRAM(s) Oral daily  pantoprazole    Tablet 40 milliGRAM(s) Oral before breakfast  rivaroxaban 20 milliGRAM(s) Oral daily  simvastatin 40 milliGRAM(s) Oral at bedtime  tamsulosin 0.4 milliGRAM(s) Oral at bedtime  tiotropium 18 MICROgram(s) Capsule 1 Capsule(s) Inhalation daily    REVIEW OF SYSTEMS:    Constitutional:            No fever, weight loss or fatigue  HEENT:                         No difficulty hearing, tinnitus, vertigo; No sinus or throat pain  Respiratory:                  SOB  Cardiovascular:           Chest pain tightness  Gastrointestinal:        No abdominal or epigastric pain. No N/V/diarrhea or hematemesis  SKIN:                             no rash  Musculoskeletal:        No joint pain or swelling  Extremities:                No swelling  Neurological:              No headaches  Psychiatric:                 No depression, anxiety    MACRA & MIPS:  Vaccines - Influenza: yes and Pneumovax: yes  Tobacco: ex smoker  Blood Pressure Screening / Control of: 124/90  Current Medications Reviewed: yes    Vital Signs Last 24 Hrs  T(C): 36.9 (13 Mar 2021 19:49), Max: 37.1 (13 Mar 2021 14:47)  T(F): 98.4 (13 Mar 2021 19:49), Max: 98.7 (13 Mar 2021 14:47)  HR: 77 (13 Mar 2021 20:52) (69 - 77)  BP: 124/90 (13 Mar 2021 19:49) (124/90 - 159/71)  BP(mean): --  RR: 20 (13 Mar 2021 19:49) (12 - 20)  SpO2: 99% (13 Mar 2021 20:52) (93% - 99%)    PHYSICAL EXAM:  GEN:         Awake, responsive and comfortable.  HEENT:    Normal.    RESP:      no   CVS:          Regular rate and rhythm.   ABD:         Soft, non-tender, non-distended;   SKIN:         Warm and dry.  EXTR:          No clubbing, cyanosis or edema  CNS:           Intact sensory and motor function.  PSYCH:        cooperative, no anxiety or depression    LABS:                        13.7   14.54 )-----------( 160      ( 13 Mar 2021 15:46 )             43.6     03-13    137  |  102  |  37<H>  ----------------------------<  355<H>  4.6   |  26  |  2.22<H>    Ca    8.5      13 Mar 2021 15:46    TPro  7.5  /  Alb  3.7  /  TBili  0.8  /  DBili  x   /  AST  108<H>  /  ALT  73  /  AlkPhos  107  03-13    PT/INR - ( 13 Mar 2021 15:46 )   PT: 13.6 sec;   INR: 1.18 ratio      PTT - ( 13 Mar 2021 15:46 )  PTT:24.1 sec  03-13 @ 15:57  pH: 7.39  pCO2: 44  pO2: 91  SaO2: 97    EKG: pacer rhythm    RADIOLOGY & ADDITIONAL STUDIES:  Cardiomegaly, Sternotomy, Left AICD, CHF.    ASSESSMENT AND PLAN:  ·	Acute on chronic hypoxic Respiratory failure.  ·	Acute on chronic diastolic CHF.  ·	Acute COPD exacerbation.  ·	Leukocytosis.  ·	Renal Insuffiencey.  ·	CAD with CABG+ stents.  ·	S/P AICD.  ·	Obesity.  ·	EMBER.  ·	HTN.  ·	HLD.  ·	DM.  ·	Gout.    Continue O2 and as needed BIPAP.  Steroids and bronchodilators.  Will add diuretics.  On rivaroxaban, reason not clear.  Echocardiogram.

## 2021-03-13 NOTE — ED ADULT NURSE NOTE - ED STAT RN HANDOFF DETAILS
Assumed care of pt from lunch relief pt admited alert and oriented x4 verbal report given to KEVAN Saucedo.

## 2021-03-14 LAB
A1C WITH ESTIMATED AVERAGE GLUCOSE RESULT: 8.9 % — HIGH (ref 4–5.6)
ALBUMIN SERPL ELPH-MCNC: 3.4 G/DL — SIGNIFICANT CHANGE UP (ref 3.3–5)
ALP SERPL-CCNC: 90 U/L — SIGNIFICANT CHANGE UP (ref 40–120)
ALT FLD-CCNC: 56 U/L — SIGNIFICANT CHANGE UP (ref 12–78)
ANION GAP SERPL CALC-SCNC: 6 MMOL/L — SIGNIFICANT CHANGE UP (ref 5–17)
AST SERPL-CCNC: 63 U/L — HIGH (ref 15–37)
BACTERIA # UR AUTO: ABNORMAL
BASOPHILS # BLD AUTO: 0.01 K/UL — SIGNIFICANT CHANGE UP (ref 0–0.2)
BASOPHILS NFR BLD AUTO: 0.1 % — SIGNIFICANT CHANGE UP (ref 0–2)
BILIRUB SERPL-MCNC: 0.7 MG/DL — SIGNIFICANT CHANGE UP (ref 0.2–1.2)
BUN SERPL-MCNC: 44 MG/DL — HIGH (ref 7–23)
CALCIUM SERPL-MCNC: 8.7 MG/DL — SIGNIFICANT CHANGE UP (ref 8.5–10.1)
CHLORIDE SERPL-SCNC: 102 MMOL/L — SIGNIFICANT CHANGE UP (ref 96–108)
CK SERPL-CCNC: 194 U/L — SIGNIFICANT CHANGE UP (ref 26–308)
CK SERPL-CCNC: 206 U/L — SIGNIFICANT CHANGE UP (ref 26–308)
CO2 SERPL-SCNC: 30 MMOL/L — SIGNIFICANT CHANGE UP (ref 22–31)
CREAT SERPL-MCNC: 2.34 MG/DL — HIGH (ref 0.5–1.3)
EOSINOPHIL # BLD AUTO: 0 K/UL — SIGNIFICANT CHANGE UP (ref 0–0.5)
EOSINOPHIL NFR BLD AUTO: 0 % — SIGNIFICANT CHANGE UP (ref 0–6)
EPI CELLS # UR: SIGNIFICANT CHANGE UP
ESTIMATED AVERAGE GLUCOSE: 209 MG/DL — HIGH (ref 68–114)
GLUCOSE BLDC GLUCOMTR-MCNC: 238 MG/DL — HIGH (ref 70–99)
GLUCOSE BLDC GLUCOMTR-MCNC: 270 MG/DL — HIGH (ref 70–99)
GLUCOSE BLDC GLUCOMTR-MCNC: 332 MG/DL — HIGH (ref 70–99)
GLUCOSE BLDC GLUCOMTR-MCNC: 356 MG/DL — HIGH (ref 70–99)
GLUCOSE BLDC GLUCOMTR-MCNC: 412 MG/DL — HIGH (ref 70–99)
GLUCOSE BLDC GLUCOMTR-MCNC: 413 MG/DL — HIGH (ref 70–99)
GLUCOSE BLDC GLUCOMTR-MCNC: 414 MG/DL — HIGH (ref 70–99)
GLUCOSE BLDC GLUCOMTR-MCNC: 435 MG/DL — HIGH (ref 70–99)
GLUCOSE SERPL-MCNC: 347 MG/DL — HIGH (ref 70–99)
HCT VFR BLD CALC: 43.3 % — SIGNIFICANT CHANGE UP (ref 39–50)
HGB BLD-MCNC: 13.4 G/DL — SIGNIFICANT CHANGE UP (ref 13–17)
HYALINE CASTS # UR AUTO: ABNORMAL /LPF
IMM GRANULOCYTES NFR BLD AUTO: 0.6 % — SIGNIFICANT CHANGE UP (ref 0–1.5)
LYMPHOCYTES # BLD AUTO: 0.65 K/UL — LOW (ref 1–3.3)
LYMPHOCYTES # BLD AUTO: 4.7 % — LOW (ref 13–44)
MCHC RBC-ENTMCNC: 30.2 PG — SIGNIFICANT CHANGE UP (ref 27–34)
MCHC RBC-ENTMCNC: 30.9 GM/DL — LOW (ref 32–36)
MCV RBC AUTO: 97.5 FL — SIGNIFICANT CHANGE UP (ref 80–100)
MONOCYTES # BLD AUTO: 0.2 K/UL — SIGNIFICANT CHANGE UP (ref 0–0.9)
MONOCYTES NFR BLD AUTO: 1.4 % — LOW (ref 2–14)
NEUTROPHILS # BLD AUTO: 13.02 K/UL — HIGH (ref 1.8–7.4)
NEUTROPHILS NFR BLD AUTO: 93.2 % — HIGH (ref 43–77)
NRBC # BLD: 0 /100 WBCS — SIGNIFICANT CHANGE UP (ref 0–0)
PLATELET # BLD AUTO: 158 K/UL — SIGNIFICANT CHANGE UP (ref 150–400)
POTASSIUM SERPL-MCNC: 5.7 MMOL/L — HIGH (ref 3.5–5.3)
POTASSIUM SERPL-SCNC: 5.7 MMOL/L — HIGH (ref 3.5–5.3)
PROT SERPL-MCNC: 7.4 GM/DL — SIGNIFICANT CHANGE UP (ref 6–8.3)
RBC # BLD: 4.44 M/UL — SIGNIFICANT CHANGE UP (ref 4.2–5.8)
RBC # FLD: 15.7 % — HIGH (ref 10.3–14.5)
RBC CASTS # UR COMP ASSIST: SIGNIFICANT CHANGE UP /HPF (ref 0–4)
SODIUM SERPL-SCNC: 138 MMOL/L — SIGNIFICANT CHANGE UP (ref 135–145)
TROPONIN I SERPL-MCNC: 0.23 NG/ML — HIGH (ref 0.01–0.04)
TROPONIN I SERPL-MCNC: 0.31 NG/ML — HIGH (ref 0.01–0.04)
WBC # BLD: 13.96 K/UL — HIGH (ref 3.8–10.5)
WBC # FLD AUTO: 13.96 K/UL — HIGH (ref 3.8–10.5)
WBC UR QL: SIGNIFICANT CHANGE UP

## 2021-03-14 PROCEDURE — 93306 TTE W/DOPPLER COMPLETE: CPT | Mod: 26

## 2021-03-14 RX ORDER — INSULIN LISPRO 100/ML
10 VIAL (ML) SUBCUTANEOUS ONCE
Refills: 0 | Status: COMPLETED | OUTPATIENT
Start: 2021-03-14 | End: 2021-03-14

## 2021-03-14 RX ORDER — FUROSEMIDE 40 MG
40 TABLET ORAL ONCE
Refills: 0 | Status: COMPLETED | OUTPATIENT
Start: 2021-03-14 | End: 2021-03-14

## 2021-03-14 RX ADMIN — TAMSULOSIN HYDROCHLORIDE 0.4 MILLIGRAM(S): 0.4 CAPSULE ORAL at 21:08

## 2021-03-14 RX ADMIN — Medication 12: at 11:38

## 2021-03-14 RX ADMIN — Medication 50 MILLIGRAM(S): at 06:41

## 2021-03-14 RX ADMIN — Medication 200 MILLIGRAM(S): at 11:37

## 2021-03-14 RX ADMIN — Medication 4: at 17:16

## 2021-03-14 RX ADMIN — Medication 40 MILLIGRAM(S): at 21:08

## 2021-03-14 RX ADMIN — Medication 1 MILLIGRAM(S): at 11:38

## 2021-03-14 RX ADMIN — Medication 40 MILLIGRAM(S): at 06:41

## 2021-03-14 RX ADMIN — Medication 10 UNIT(S): at 13:33

## 2021-03-14 RX ADMIN — Medication 40 MILLIGRAM(S): at 14:04

## 2021-03-14 RX ADMIN — Medication 81 MILLIGRAM(S): at 11:38

## 2021-03-14 RX ADMIN — PANTOPRAZOLE SODIUM 40 MILLIGRAM(S): 20 TABLET, DELAYED RELEASE ORAL at 08:43

## 2021-03-14 RX ADMIN — Medication 40 MILLIGRAM(S): at 13:18

## 2021-03-14 RX ADMIN — Medication 8: at 08:38

## 2021-03-14 RX ADMIN — CEFTRIAXONE 100 MILLIGRAM(S): 500 INJECTION, POWDER, FOR SOLUTION INTRAMUSCULAR; INTRAVENOUS at 17:16

## 2021-03-14 RX ADMIN — RIVAROXABAN 20 MILLIGRAM(S): KIT at 11:37

## 2021-03-14 RX ADMIN — SIMVASTATIN 40 MILLIGRAM(S): 20 TABLET, FILM COATED ORAL at 21:08

## 2021-03-14 RX ADMIN — TIOTROPIUM BROMIDE 1 CAPSULE(S): 18 CAPSULE ORAL; RESPIRATORY (INHALATION) at 13:18

## 2021-03-15 DIAGNOSIS — E11.65 TYPE 2 DIABETES MELLITUS WITH HYPERGLYCEMIA: ICD-10-CM

## 2021-03-15 LAB
ANION GAP SERPL CALC-SCNC: 8 MMOL/L — SIGNIFICANT CHANGE UP (ref 5–17)
BUN SERPL-MCNC: 63 MG/DL — HIGH (ref 7–23)
CALCIUM SERPL-MCNC: 8.9 MG/DL — SIGNIFICANT CHANGE UP (ref 8.5–10.1)
CHLORIDE SERPL-SCNC: 101 MMOL/L — SIGNIFICANT CHANGE UP (ref 96–108)
CO2 SERPL-SCNC: 29 MMOL/L — SIGNIFICANT CHANGE UP (ref 22–31)
CREAT SERPL-MCNC: 2.43 MG/DL — HIGH (ref 0.5–1.3)
CULTURE RESULTS: SIGNIFICANT CHANGE UP
GLUCOSE BLDC GLUCOMTR-MCNC: 260 MG/DL — HIGH (ref 70–99)
GLUCOSE BLDC GLUCOMTR-MCNC: 411 MG/DL — HIGH (ref 70–99)
GLUCOSE BLDC GLUCOMTR-MCNC: 411 MG/DL — HIGH (ref 70–99)
GLUCOSE BLDC GLUCOMTR-MCNC: 460 MG/DL — CRITICAL HIGH (ref 70–99)
GLUCOSE BLDC GLUCOMTR-MCNC: 462 MG/DL — CRITICAL HIGH (ref 70–99)
GLUCOSE BLDC GLUCOMTR-MCNC: 462 MG/DL — CRITICAL HIGH (ref 70–99)
GLUCOSE BLDC GLUCOMTR-MCNC: 472 MG/DL — CRITICAL HIGH (ref 70–99)
GLUCOSE BLDC GLUCOMTR-MCNC: 531 MG/DL — CRITICAL HIGH (ref 70–99)
GLUCOSE SERPL-MCNC: 291 MG/DL — HIGH (ref 70–99)
HCT VFR BLD CALC: 41.4 % — SIGNIFICANT CHANGE UP (ref 39–50)
HGB BLD-MCNC: 12.8 G/DL — LOW (ref 13–17)
LEGIONELLA AG UR QL: NEGATIVE — SIGNIFICANT CHANGE UP
MCHC RBC-ENTMCNC: 29.8 PG — SIGNIFICANT CHANGE UP (ref 27–34)
MCHC RBC-ENTMCNC: 30.9 GM/DL — LOW (ref 32–36)
MCV RBC AUTO: 96.5 FL — SIGNIFICANT CHANGE UP (ref 80–100)
NRBC # BLD: 0 /100 WBCS — SIGNIFICANT CHANGE UP (ref 0–0)
NT-PROBNP SERPL-SCNC: 6807 PG/ML — HIGH (ref 0–450)
PLATELET # BLD AUTO: 158 K/UL — SIGNIFICANT CHANGE UP (ref 150–400)
POTASSIUM SERPL-MCNC: 5.2 MMOL/L — SIGNIFICANT CHANGE UP (ref 3.5–5.3)
POTASSIUM SERPL-SCNC: 5.2 MMOL/L — SIGNIFICANT CHANGE UP (ref 3.5–5.3)
RBC # BLD: 4.29 M/UL — SIGNIFICANT CHANGE UP (ref 4.2–5.8)
RBC # FLD: 16 % — HIGH (ref 10.3–14.5)
SODIUM SERPL-SCNC: 138 MMOL/L — SIGNIFICANT CHANGE UP (ref 135–145)
SPECIMEN SOURCE: SIGNIFICANT CHANGE UP
WBC # BLD: 17.38 K/UL — HIGH (ref 3.8–10.5)
WBC # FLD AUTO: 17.38 K/UL — HIGH (ref 3.8–10.5)

## 2021-03-15 RX ORDER — INSULIN GLARGINE 100 [IU]/ML
10 INJECTION, SOLUTION SUBCUTANEOUS AT BEDTIME
Refills: 0 | Status: DISCONTINUED | OUTPATIENT
Start: 2021-03-15 | End: 2021-03-19

## 2021-03-15 RX ORDER — METOPROLOL TARTRATE 50 MG
50 TABLET ORAL DAILY
Refills: 0 | Status: DISCONTINUED | OUTPATIENT
Start: 2021-03-16 | End: 2021-03-19

## 2021-03-15 RX ORDER — RIVAROXABAN 15 MG-20MG
15 KIT ORAL DAILY
Refills: 0 | Status: DISCONTINUED | OUTPATIENT
Start: 2021-03-15 | End: 2021-03-19

## 2021-03-15 RX ORDER — INSULIN LISPRO 100/ML
10 VIAL (ML) SUBCUTANEOUS ONCE
Refills: 0 | Status: COMPLETED | OUTPATIENT
Start: 2021-03-15 | End: 2021-03-15

## 2021-03-15 RX ADMIN — Medication 12: at 10:55

## 2021-03-15 RX ADMIN — SIMVASTATIN 40 MILLIGRAM(S): 20 TABLET, FILM COATED ORAL at 22:04

## 2021-03-15 RX ADMIN — Medication 40 MILLIGRAM(S): at 05:45

## 2021-03-15 RX ADMIN — Medication 12: at 16:41

## 2021-03-15 RX ADMIN — Medication 50 MILLIGRAM(S): at 05:44

## 2021-03-15 RX ADMIN — PANTOPRAZOLE SODIUM 40 MILLIGRAM(S): 20 TABLET, DELAYED RELEASE ORAL at 08:17

## 2021-03-15 RX ADMIN — CEFTRIAXONE 100 MILLIGRAM(S): 500 INJECTION, POWDER, FOR SOLUTION INTRAMUSCULAR; INTRAVENOUS at 17:41

## 2021-03-15 RX ADMIN — Medication 6: at 08:16

## 2021-03-15 RX ADMIN — TAMSULOSIN HYDROCHLORIDE 0.4 MILLIGRAM(S): 0.4 CAPSULE ORAL at 22:04

## 2021-03-15 RX ADMIN — RIVAROXABAN 20 MILLIGRAM(S): KIT at 11:22

## 2021-03-15 RX ADMIN — TIOTROPIUM BROMIDE 1 CAPSULE(S): 18 CAPSULE ORAL; RESPIRATORY (INHALATION) at 11:25

## 2021-03-15 RX ADMIN — Medication 200 MILLIGRAM(S): at 11:25

## 2021-03-15 RX ADMIN — Medication 1 MILLIGRAM(S): at 11:25

## 2021-03-15 RX ADMIN — Medication 8: at 22:03

## 2021-03-15 RX ADMIN — Medication 40 MILLIGRAM(S): at 17:40

## 2021-03-15 RX ADMIN — INSULIN GLARGINE 10 UNIT(S): 100 INJECTION, SOLUTION SUBCUTANEOUS at 22:02

## 2021-03-15 RX ADMIN — Medication 81 MILLIGRAM(S): at 11:22

## 2021-03-15 RX ADMIN — Medication 10 UNIT(S): at 16:59

## 2021-03-15 NOTE — CONSULT NOTE ADULT - SUBJECTIVE AND OBJECTIVE BOX
Patient chart reviewed, full consult to follow.     Thank you for the courtesy of this consultation. [facility], [facility]  NEPHROLOGY AND HYPERTENSION  [address]  [address]  [place] [address]  [phone]    MD DANIEL GARRETT MD ANDREY GONCHARUK, MD MADHU KORRAPATI, MD YELENA ROSENBERG, MD BINNY KOSHY, MD CHRISTOPHER CAPUTO, MD EDWARD BOVER, MD      Information from chart:  "Patient is a 75y old  Male who presents with a chief complaint of Shortness of breath (15 Mar 2021 14:49)    HPI:  76yo, BM with h/o HTN, DM, COPD (on home o2 2L), CHF, PPM/AICD presents today biba from home with sudden onset of sob, states that he was watching television when his symptoms occurred, shortly before he did ambulate to the bathroom +chest tightness (-) fevers or chills (-) cough (-) flu like symptoms, (-) leg edema. Notes CP.   In the field pt's o2 saturation was 69%, he was given dexamethasone and albuterol and placed on cpap with improvement   (13 Mar 2021 18:29)   "  Patient know to me from my office  hx CKD 3     PAST MEDICAL & SURGICAL HISTORY:  AICD (automatic cardioverter/defibrillator) present    Pacemaker    COPD (chronic obstructive pulmonary disease)    Pneumonia    CHF (congestive heart failure)    High cholesterol    DM (diabetes mellitus), type 2    HTN (hypertension)    S/P hernia repair  hernia [id number]    S/P cardiac cath  cardiac stentin     S/P cardiac pacemaker procedure  defibrilator     S/P CABG x 3  cabg3  in       FAMILY HISTORY:  No pertinent family history in first degree relatives      Allergies    No Known Allergies    Intolerances      Home Medications:  allopurinol 100 mg oral tablet: 2 tab(s) orally once a day (13 Mar 2021 15:08)  aspirin 81 mg oral tablet: 1 tab(s) orally once a day (13 Mar 2021 15:08)  Entresto 24 mg-26 mg oral tablet: 1 tab(s) orally 2 times a day (13 Mar 2021 15:08)  folic acid 1 mg oral tablet: 1 tab(s) orally once a day (13 Mar 2021 15:08)  Januvia 50 mg oral tablet: 1 tab(s) orally once a day (13 Mar 2021 15:08)  Lasix 40 mg oral tablet: 1 tab(s) orally once a day (13 Mar 2021 15:08)  Metoprolol Succinate ER 50 mg oral tablet, extended release: 1.5 tab(s) orally once a day (13 Mar 2021 15:08)  nateglinide 120 mg oral tablet: 1 tab(s) orally 3 times a day (before meals) (13 Mar 2021 15:08)  tamsulosin 0.4 mg oral capsule: 1 cap(s) orally once a day (13 Mar 2021 15:08)  Toujeo SoloStar 300 units/mL subcutaneous solution: 53  subcutaneous once a day (at bedtime) (13 Mar 2021 15:08)  Trulicity Pen 1.5 mg/0.5 mL subcutaneous solution: once a week (13 Mar 2021 15:08)  Xarelto 20 mg oral tablet: 1 tab(s) orally once a day (in the evening) (13 Mar 2021 15:08)  Zocor 40 mg oral tablet: 1 tab(s) orally once a day (at bedtime) (13 Mar 2021 15:08)    MEDICATIONS  (STANDING):  ALBUTerol    90 MICROgram(s) HFA Inhaler 1 Puff(s) Inhalation every 4 hours  allopurinol 200 milliGRAM(s) Oral daily  aspirin enteric coated 81 milliGRAM(s) Oral daily  cefTRIAXone   IVPB      cefTRIAXone   IVPB 1000 milliGRAM(s) IV Intermittent every 24 hours  dextrose 40% Gel 15 Gram(s) Oral once  dextrose 5%. 1000 milliLiter(s) (50 mL/Hr) IV Continuous <Continuous>  dextrose 50% Injectable 25 Gram(s) IV Push once  dextrose 50% Injectable 12.5 Gram(s) IV Push once  dextrose 50% Injectable 25 Gram(s) IV Push once  folic acid 1 milliGRAM(s) Oral daily  glucagon  Injectable 1 milliGRAM(s) IntraMuscular once  insulin glargine Injectable (LANTUS) 10 Unit(s) SubCutaneous at bedtime  insulin lispro (ADMELOG) corrective regimen sliding scale   SubCutaneous at bedtime  insulin lispro (ADMELOG) corrective regimen sliding scale   SubCutaneous three times a day before meals  methylPREDNISolone sodium succinate Injectable 40 milliGRAM(s) IV Push every 12 hours  metoprolol succinate ER 50 milliGRAM(s) Oral daily  pantoprazole    Tablet 40 milliGRAM(s) Oral before breakfast  rivaroxaban 15 milliGRAM(s) Oral daily  simvastatin 40 milliGRAM(s) Oral at bedtime  tamsulosin 0.4 milliGRAM(s) Oral at bedtime  tiotropium 18 MICROgram(s) Capsule 1 Capsule(s) Inhalation daily    MEDICATIONS  (PRN):    Vital Signs Last 24 Hrs  T(C): 37.1 (15 Mar 2021 16:45), Max: 37.1 (15 Mar 2021 16:45)  T(F): 98.8 (15 Mar 2021 16:45), Max: 98.8 (15 Mar 2021 16:45)  HR: 68 (15 Mar 2021 21:23) (68 - 77)  BP: 129/76 (15 Mar 2021 16:45) (112/73 - 129/76)  BP(mean): --  RR: 18 (15 Mar 2021 16:45) (18 - 18)  SpO2: 97% (15 Mar 2021 21:23) (95% - 100%)    Daily     Daily Weight in k.8 (15 Mar 2021 04:41)    21 @ 07:01  -  03-15-21 @ 07:00  --------------------------------------------------------  IN: 100 mL / OUT: 800 mL / NET: -700 mL      CAPILLARY BLOOD GLUCOSE      POCT Blood Glucose.: 411 mg/dL (15 Mar 2021 21:07)  POCT Blood Glucose.: 472 mg/dL (15 Mar 2021 18:16)  POCT Blood Glucose.: 531 mg/dL (15 Mar 2021 16:34)  POCT Blood Glucose.: 462 mg/dL (15 Mar 2021 16:30)  POCT Blood Glucose.: 462 mg/dL (15 Mar 2021 16:27)  POCT Blood Glucose.: 411 mg/dL (15 Mar 2021 10:51)  POCT Blood Glucose.: 460 mg/dL (15 Mar 2021 10:50)  POCT Blood Glucose.: 260 mg/dL (15 Mar 2021 07:47)    PHYSICAL EXAM:      T(C): 37.1 (03-15-21 @ 16:45), Max: 37.1 (03-15-21 @ 16:45)  HR: 68 (03-15-21 @ 21:23) (68 - 77)  BP: 129/76 (03-15-21 @ 16:45) (112/73 - 129/76)  RR: 18 (03-15-21 @ 16:45) (18 - 18)  SpO2: 97% (03-15-21 @ 21:23) (95% - 100%)  Wt(kg): --  Lungs clear  Heart S1S2  Abd soft NT ND  Extremities:   tr edema              03-15    138  |  101  |  63<H>  ----------------------------<  291<H>  5.2   |  29  |  2.43<H>    Ca    8.9      15 Mar 2021 08:09    TPro  7.4  /  Alb  3.4  /  TBili  0.7  /  DBili  x   /  AST  63<H>  /  ALT  56  /  AlkPhos  90                            12.8   17.38 )-----------( 158      ( 15 Mar 2021 08:09 )             41.4     Creatinine Trend: 2.43<--, 2.34<--, 2.22<--  Urinalysis Basic - ( 13 Mar 2021 23:45 )    Color: Yellow / Appearance: Clear / S.015 / pH: x  Gluc: x / Ketone: Negative  / Bili: Negative / Urobili: Negative mg/dL   Blood: x / Protein: 100 mg/dL / Nitrite: Negative   Leuk Esterase: Negative / RBC: 0-2 /HPF / WBC 0-2   Sq Epi: x / Non Sq Epi: Occasional / Bacteria: Occasional            Assessment   DENISHA CKD 3, pre renal azotemia; warranted diuresis   PNA    Plan  Continue current plan  Will follow course.     Jon Rowe MD

## 2021-03-16 LAB
ANION GAP SERPL CALC-SCNC: 7 MMOL/L — SIGNIFICANT CHANGE UP (ref 5–17)
BUN SERPL-MCNC: 69 MG/DL — HIGH (ref 7–23)
CALCIUM SERPL-MCNC: 8.6 MG/DL — SIGNIFICANT CHANGE UP (ref 8.5–10.1)
CHLORIDE SERPL-SCNC: 101 MMOL/L — SIGNIFICANT CHANGE UP (ref 96–108)
CO2 SERPL-SCNC: 30 MMOL/L — SIGNIFICANT CHANGE UP (ref 22–31)
CREAT SERPL-MCNC: 2.28 MG/DL — HIGH (ref 0.5–1.3)
GLUCOSE BLDC GLUCOMTR-MCNC: 265 MG/DL — HIGH (ref 70–99)
GLUCOSE BLDC GLUCOMTR-MCNC: 337 MG/DL — HIGH (ref 70–99)
GLUCOSE BLDC GLUCOMTR-MCNC: 381 MG/DL — HIGH (ref 70–99)
GLUCOSE BLDC GLUCOMTR-MCNC: 446 MG/DL — HIGH (ref 70–99)
GLUCOSE SERPL-MCNC: 285 MG/DL — HIGH (ref 70–99)
HCT VFR BLD CALC: 40.1 % — SIGNIFICANT CHANGE UP (ref 39–50)
HGB BLD-MCNC: 12.6 G/DL — LOW (ref 13–17)
MAGNESIUM SERPL-MCNC: 2.6 MG/DL — SIGNIFICANT CHANGE UP (ref 1.6–2.6)
MCHC RBC-ENTMCNC: 30.1 PG — SIGNIFICANT CHANGE UP (ref 27–34)
MCHC RBC-ENTMCNC: 31.4 GM/DL — LOW (ref 32–36)
MCV RBC AUTO: 95.7 FL — SIGNIFICANT CHANGE UP (ref 80–100)
NRBC # BLD: 0 /100 WBCS — SIGNIFICANT CHANGE UP (ref 0–0)
PHOSPHATE SERPL-MCNC: 3.2 MG/DL — SIGNIFICANT CHANGE UP (ref 2.5–4.5)
PLATELET # BLD AUTO: 157 K/UL — SIGNIFICANT CHANGE UP (ref 150–400)
POTASSIUM SERPL-MCNC: 5.3 MMOL/L — SIGNIFICANT CHANGE UP (ref 3.5–5.3)
POTASSIUM SERPL-SCNC: 5.3 MMOL/L — SIGNIFICANT CHANGE UP (ref 3.5–5.3)
RBC # BLD: 4.19 M/UL — LOW (ref 4.2–5.8)
RBC # FLD: 15.9 % — HIGH (ref 10.3–14.5)
SARS-COV-2 IGG SERPL QL IA: NEGATIVE — SIGNIFICANT CHANGE UP
SARS-COV-2 IGM SERPL IA-ACNC: 0.08 INDEX — SIGNIFICANT CHANGE UP
SODIUM SERPL-SCNC: 138 MMOL/L — SIGNIFICANT CHANGE UP (ref 135–145)
WBC # BLD: 16.86 K/UL — HIGH (ref 3.8–10.5)
WBC # FLD AUTO: 16.86 K/UL — HIGH (ref 3.8–10.5)

## 2021-03-16 RX ORDER — INSULIN LISPRO 100/ML
8 VIAL (ML) SUBCUTANEOUS ONCE
Refills: 0 | Status: COMPLETED | OUTPATIENT
Start: 2021-03-16 | End: 2021-03-16

## 2021-03-16 RX ORDER — ACETAMINOPHEN 500 MG
650 TABLET ORAL ONCE
Refills: 0 | Status: COMPLETED | OUTPATIENT
Start: 2021-03-16 | End: 2021-03-16

## 2021-03-16 RX ORDER — REGADENOSON 0.08 MG/ML
0.4 INJECTION, SOLUTION INTRAVENOUS ONCE
Refills: 0 | Status: COMPLETED | OUTPATIENT
Start: 2021-03-16 | End: 2021-03-17

## 2021-03-16 RX ADMIN — Medication 8 UNIT(S): at 17:15

## 2021-03-16 RX ADMIN — Medication 40 MILLIGRAM(S): at 06:01

## 2021-03-16 RX ADMIN — Medication 200 MILLIGRAM(S): at 11:46

## 2021-03-16 RX ADMIN — RIVAROXABAN 15 MILLIGRAM(S): KIT at 11:46

## 2021-03-16 RX ADMIN — Medication 12: at 17:15

## 2021-03-16 RX ADMIN — Medication 6: at 21:42

## 2021-03-16 RX ADMIN — TAMSULOSIN HYDROCHLORIDE 0.4 MILLIGRAM(S): 0.4 CAPSULE ORAL at 21:43

## 2021-03-16 RX ADMIN — Medication 50 MILLIGRAM(S): at 06:01

## 2021-03-16 RX ADMIN — PANTOPRAZOLE SODIUM 40 MILLIGRAM(S): 20 TABLET, DELAYED RELEASE ORAL at 06:01

## 2021-03-16 RX ADMIN — TIOTROPIUM BROMIDE 1 CAPSULE(S): 18 CAPSULE ORAL; RESPIRATORY (INHALATION) at 11:46

## 2021-03-16 RX ADMIN — Medication 40 MILLIGRAM(S): at 17:16

## 2021-03-16 RX ADMIN — Medication 1 MILLIGRAM(S): at 11:46

## 2021-03-16 RX ADMIN — Medication 6: at 08:14

## 2021-03-16 RX ADMIN — CEFTRIAXONE 100 MILLIGRAM(S): 500 INJECTION, POWDER, FOR SOLUTION INTRAMUSCULAR; INTRAVENOUS at 17:15

## 2021-03-16 RX ADMIN — Medication 650 MILLIGRAM(S): at 23:45

## 2021-03-16 RX ADMIN — INSULIN GLARGINE 10 UNIT(S): 100 INJECTION, SOLUTION SUBCUTANEOUS at 21:42

## 2021-03-16 RX ADMIN — SIMVASTATIN 40 MILLIGRAM(S): 20 TABLET, FILM COATED ORAL at 21:43

## 2021-03-16 RX ADMIN — Medication 81 MILLIGRAM(S): at 11:45

## 2021-03-16 RX ADMIN — Medication 8: at 11:46

## 2021-03-16 NOTE — PHYSICAL THERAPY INITIAL EVALUATION ADULT - CRITERIA FOR SKILLED THERAPEUTIC INTERVENTIONS
2-3 weeks/impairments found/functional limitations in following categories/risk reduction/prevention/rehab potential/therapy frequency/predicted duration of therapy intervention/anticipated discharge recommendation

## 2021-03-16 NOTE — PHYSICAL THERAPY INITIAL EVALUATION ADULT - PRECAUTIONS/LIMITATIONS, REHAB EVAL
2L Nasal Cannula/fall precautions/obesity precautions/oxygen therapy device and L/min 2L Nasal Cannula/cardiac precautions/fall precautions/obesity precautions/oxygen therapy device and L/min

## 2021-03-16 NOTE — DIETITIAN INITIAL EVALUATION ADULT. - PERTINENT MEDS FT
MEDICATIONS  (STANDING):  ALBUTerol    90 MICROgram(s) HFA Inhaler 1 Puff(s) Inhalation every 4 hours  allopurinol 200 milliGRAM(s) Oral daily  aspirin enteric coated 81 milliGRAM(s) Oral daily  cefTRIAXone   IVPB      cefTRIAXone   IVPB 1000 milliGRAM(s) IV Intermittent every 24 hours  dextrose 40% Gel 15 Gram(s) Oral once  dextrose 5%. 1000 milliLiter(s) (50 mL/Hr) IV Continuous <Continuous>  dextrose 50% Injectable 25 Gram(s) IV Push once  dextrose 50% Injectable 12.5 Gram(s) IV Push once  dextrose 50% Injectable 25 Gram(s) IV Push once  folic acid 1 milliGRAM(s) Oral daily  glucagon  Injectable 1 milliGRAM(s) IntraMuscular once  insulin glargine Injectable (LANTUS) 10 Unit(s) SubCutaneous at bedtime  insulin lispro (ADMELOG) corrective regimen sliding scale   SubCutaneous at bedtime  insulin lispro (ADMELOG) corrective regimen sliding scale   SubCutaneous three times a day before meals  methylPREDNISolone sodium succinate Injectable 40 milliGRAM(s) IV Push every 12 hours  metoprolol succinate ER 50 milliGRAM(s) Oral daily  pantoprazole    Tablet 40 milliGRAM(s) Oral before breakfast  rivaroxaban 15 milliGRAM(s) Oral daily  simvastatin 40 milliGRAM(s) Oral at bedtime  tamsulosin 0.4 milliGRAM(s) Oral at bedtime  tiotropium 18 MICROgram(s) Capsule 1 Capsule(s) Inhalation daily    MEDICATIONS  (PRN):

## 2021-03-16 NOTE — PHYSICAL THERAPY INITIAL EVALUATION ADULT - GAIT TRAINING, PT EVAL
Pt will be able to ambulate 200 ft independently within 2-3 weeks, while maintaining SPO2 above 90%, w/o sob

## 2021-03-16 NOTE — DIETITIAN INITIAL EVALUATION ADULT. - OTHER INFO
Pt adm w/SOB. Met w/pt at bedside, pt reports good appetite and PO intake. Pt denies N/V/D/C or difficulty chewing/swallowing. Pt states he has dentures but not with him. Pt declined offer of Soft diet, states he can chew without difficulty. PTA pt reports eating very well, not following any specific diets. Pt reports living w/spouse who does all the food shopping & cooking. Pt states wife usually cooks healthy meals. Pt reports checking his finger sticks x 2/day (before breakfast and after dinner) and states numbers have been "high" lately. Noted HbA1c of 8.9% indicating fair glycemic control. Pt states the numbers are high due to steroid treatments for SOB. PTA pt was taking Januvia, Trulicity, Nateglinide, Toujeo (53 units at bedtime). Pt reports being compliant w/all meds. Pt also sees an endocrinologist regularly. Pt reports UBW of ~243-245# w/fluid-related wt fluctuations. Diet education on Diabetes & Nutrition, Heart Failure Nutrition Therapy, Meal Planning w/Plate method, and Weight Management provided. Encouraged healthier meals/snacks, CHO-controlled meals, Low Salt foods. Pt receptive to diet education and accepted handouts. Pt made aware RD remains available.

## 2021-03-16 NOTE — PHYSICAL THERAPY INITIAL EVALUATION ADULT - ADDITIONAL COMMENTS
There are 8 steps c bilateral railings within reach at the entry of the house. Pt says that there are 7 steps c bilateral railings to negotiate inside the house, pt states they are not necessary to negotiate at home.  pt claims that he was ambulating at home and was using O2 at home when needed.

## 2021-03-16 NOTE — DIETITIAN INITIAL EVALUATION ADULT. - PERTINENT LABORATORY DATA
03-16 Na138 mmol/L Glu 285 mg/dL<H> K+ 5.3 mmol/L Cr  2.28 mg/dL<H> BUN 69 mg/dL<H> 03-16 Phos 3.2 mg/dL 03-14 Alb 3.4 g/dL    03-14-21 @ 11:15A1C 8.9  POCT Glucose (3/16) 265, (3/15) 411, 472, 531, 460, 260, 270, 238, 435, 412, 413, 414, 332, 356.

## 2021-03-17 DIAGNOSIS — N17.9 ACUTE KIDNEY FAILURE, UNSPECIFIED: ICD-10-CM

## 2021-03-17 LAB
ALBUMIN SERPL ELPH-MCNC: 3.3 G/DL — SIGNIFICANT CHANGE UP (ref 3.3–5)
ALP SERPL-CCNC: 91 U/L — SIGNIFICANT CHANGE UP (ref 40–120)
ALT FLD-CCNC: 84 U/L — HIGH (ref 12–78)
ANION GAP SERPL CALC-SCNC: 5 MMOL/L — SIGNIFICANT CHANGE UP (ref 5–17)
AST SERPL-CCNC: 65 U/L — HIGH (ref 15–37)
BILIRUB SERPL-MCNC: 0.7 MG/DL — SIGNIFICANT CHANGE UP (ref 0.2–1.2)
BUN SERPL-MCNC: 74 MG/DL — HIGH (ref 7–23)
CALCIUM SERPL-MCNC: 8.6 MG/DL — SIGNIFICANT CHANGE UP (ref 8.5–10.1)
CHLORIDE SERPL-SCNC: 103 MMOL/L — SIGNIFICANT CHANGE UP (ref 96–108)
CO2 SERPL-SCNC: 29 MMOL/L — SIGNIFICANT CHANGE UP (ref 22–31)
CREAT SERPL-MCNC: 2.26 MG/DL — HIGH (ref 0.5–1.3)
GLUCOSE BLDC GLUCOMTR-MCNC: 283 MG/DL — HIGH (ref 70–99)
GLUCOSE BLDC GLUCOMTR-MCNC: 314 MG/DL — HIGH (ref 70–99)
GLUCOSE BLDC GLUCOMTR-MCNC: 356 MG/DL — HIGH (ref 70–99)
GLUCOSE BLDC GLUCOMTR-MCNC: 368 MG/DL — HIGH (ref 70–99)
GLUCOSE SERPL-MCNC: 321 MG/DL — HIGH (ref 70–99)
HCT VFR BLD CALC: 42.8 % — SIGNIFICANT CHANGE UP (ref 39–50)
HGB BLD-MCNC: 13 G/DL — SIGNIFICANT CHANGE UP (ref 13–17)
MAGNESIUM SERPL-MCNC: 2.8 MG/DL — HIGH (ref 1.6–2.6)
MCHC RBC-ENTMCNC: 29.9 PG — SIGNIFICANT CHANGE UP (ref 27–34)
MCHC RBC-ENTMCNC: 30.4 GM/DL — LOW (ref 32–36)
MCV RBC AUTO: 98.4 FL — SIGNIFICANT CHANGE UP (ref 80–100)
NRBC # BLD: 0 /100 WBCS — SIGNIFICANT CHANGE UP (ref 0–0)
PHOSPHATE SERPL-MCNC: 4.1 MG/DL — SIGNIFICANT CHANGE UP (ref 2.5–4.5)
PLATELET # BLD AUTO: 157 K/UL — SIGNIFICANT CHANGE UP (ref 150–400)
POTASSIUM SERPL-MCNC: 5.1 MMOL/L — SIGNIFICANT CHANGE UP (ref 3.5–5.3)
POTASSIUM SERPL-SCNC: 5.1 MMOL/L — SIGNIFICANT CHANGE UP (ref 3.5–5.3)
PROT SERPL-MCNC: 6.9 GM/DL — SIGNIFICANT CHANGE UP (ref 6–8.3)
RBC # BLD: 4.35 M/UL — SIGNIFICANT CHANGE UP (ref 4.2–5.8)
RBC # FLD: 15.8 % — HIGH (ref 10.3–14.5)
SODIUM SERPL-SCNC: 137 MMOL/L — SIGNIFICANT CHANGE UP (ref 135–145)
WBC # BLD: 16.63 K/UL — HIGH (ref 3.8–10.5)
WBC # FLD AUTO: 16.63 K/UL — HIGH (ref 3.8–10.5)

## 2021-03-17 PROCEDURE — 93018 CV STRESS TEST I&R ONLY: CPT

## 2021-03-17 RX ORDER — INSULIN LISPRO 100/ML
8 VIAL (ML) SUBCUTANEOUS
Refills: 0 | Status: DISCONTINUED | OUTPATIENT
Start: 2021-03-17 | End: 2021-03-19

## 2021-03-17 RX ADMIN — PANTOPRAZOLE SODIUM 40 MILLIGRAM(S): 20 TABLET, DELAYED RELEASE ORAL at 06:12

## 2021-03-17 RX ADMIN — Medication 200 MILLIGRAM(S): at 12:22

## 2021-03-17 RX ADMIN — Medication 81 MILLIGRAM(S): at 12:22

## 2021-03-17 RX ADMIN — Medication 20 MILLIGRAM(S): at 17:30

## 2021-03-17 RX ADMIN — Medication 40 MILLIGRAM(S): at 05:26

## 2021-03-17 RX ADMIN — Medication 1 MILLIGRAM(S): at 12:22

## 2021-03-17 RX ADMIN — Medication 8: at 12:20

## 2021-03-17 RX ADMIN — REGADENOSON 0.4 MILLIGRAM(S): 0.08 INJECTION, SOLUTION INTRAVENOUS at 10:41

## 2021-03-17 RX ADMIN — SIMVASTATIN 40 MILLIGRAM(S): 20 TABLET, FILM COATED ORAL at 21:33

## 2021-03-17 RX ADMIN — Medication 50 MILLIGRAM(S): at 05:26

## 2021-03-17 RX ADMIN — TIOTROPIUM BROMIDE 1 CAPSULE(S): 18 CAPSULE ORAL; RESPIRATORY (INHALATION) at 12:24

## 2021-03-17 RX ADMIN — Medication 8 UNIT(S): at 12:23

## 2021-03-17 RX ADMIN — Medication 10: at 16:56

## 2021-03-17 RX ADMIN — Medication 6: at 08:07

## 2021-03-17 RX ADMIN — RIVAROXABAN 15 MILLIGRAM(S): KIT at 12:23

## 2021-03-17 RX ADMIN — Medication 6: at 21:32

## 2021-03-17 RX ADMIN — Medication 8 UNIT(S): at 16:56

## 2021-03-17 RX ADMIN — INSULIN GLARGINE 10 UNIT(S): 100 INJECTION, SOLUTION SUBCUTANEOUS at 21:33

## 2021-03-17 RX ADMIN — CEFTRIAXONE 100 MILLIGRAM(S): 500 INJECTION, POWDER, FOR SOLUTION INTRAMUSCULAR; INTRAVENOUS at 17:29

## 2021-03-17 RX ADMIN — TAMSULOSIN HYDROCHLORIDE 0.4 MILLIGRAM(S): 0.4 CAPSULE ORAL at 21:33

## 2021-03-18 LAB
ALBUMIN SERPL ELPH-MCNC: 3.3 G/DL — SIGNIFICANT CHANGE UP (ref 3.3–5)
ALP SERPL-CCNC: 98 U/L — SIGNIFICANT CHANGE UP (ref 40–120)
ALT FLD-CCNC: 282 U/L — HIGH (ref 12–78)
ANION GAP SERPL CALC-SCNC: 6 MMOL/L — SIGNIFICANT CHANGE UP (ref 5–17)
AST SERPL-CCNC: 234 U/L — HIGH (ref 15–37)
BILIRUB SERPL-MCNC: 0.7 MG/DL — SIGNIFICANT CHANGE UP (ref 0.2–1.2)
BUN SERPL-MCNC: 70 MG/DL — HIGH (ref 7–23)
CALCIUM SERPL-MCNC: 8.5 MG/DL — SIGNIFICANT CHANGE UP (ref 8.5–10.1)
CHLORIDE SERPL-SCNC: 107 MMOL/L — SIGNIFICANT CHANGE UP (ref 96–108)
CO2 SERPL-SCNC: 29 MMOL/L — SIGNIFICANT CHANGE UP (ref 22–31)
CREAT SERPL-MCNC: 2.12 MG/DL — HIGH (ref 0.5–1.3)
GLUCOSE BLDC GLUCOMTR-MCNC: 287 MG/DL — HIGH (ref 70–99)
GLUCOSE BLDC GLUCOMTR-MCNC: 297 MG/DL — HIGH (ref 70–99)
GLUCOSE BLDC GLUCOMTR-MCNC: 348 MG/DL — HIGH (ref 70–99)
GLUCOSE BLDC GLUCOMTR-MCNC: 374 MG/DL — HIGH (ref 70–99)
GLUCOSE SERPL-MCNC: 312 MG/DL — HIGH (ref 70–99)
HCT VFR BLD CALC: 43.7 % — SIGNIFICANT CHANGE UP (ref 39–50)
HGB BLD-MCNC: 13.6 G/DL — SIGNIFICANT CHANGE UP (ref 13–17)
MAGNESIUM SERPL-MCNC: 2.7 MG/DL — HIGH (ref 1.6–2.6)
MCHC RBC-ENTMCNC: 29.7 PG — SIGNIFICANT CHANGE UP (ref 27–34)
MCHC RBC-ENTMCNC: 31.1 GM/DL — LOW (ref 32–36)
MCV RBC AUTO: 95.4 FL — SIGNIFICANT CHANGE UP (ref 80–100)
NRBC # BLD: 0 /100 WBCS — SIGNIFICANT CHANGE UP (ref 0–0)
PHOSPHATE SERPL-MCNC: 3.8 MG/DL — SIGNIFICANT CHANGE UP (ref 2.5–4.5)
PLATELET # BLD AUTO: 153 K/UL — SIGNIFICANT CHANGE UP (ref 150–400)
POTASSIUM SERPL-MCNC: 5 MMOL/L — SIGNIFICANT CHANGE UP (ref 3.5–5.3)
POTASSIUM SERPL-SCNC: 5 MMOL/L — SIGNIFICANT CHANGE UP (ref 3.5–5.3)
PROT SERPL-MCNC: 6.9 GM/DL — SIGNIFICANT CHANGE UP (ref 6–8.3)
RBC # BLD: 4.58 M/UL — SIGNIFICANT CHANGE UP (ref 4.2–5.8)
RBC # FLD: 15.9 % — HIGH (ref 10.3–14.5)
SODIUM SERPL-SCNC: 142 MMOL/L — SIGNIFICANT CHANGE UP (ref 135–145)
WBC # BLD: 14.79 K/UL — HIGH (ref 3.8–10.5)
WBC # FLD AUTO: 14.79 K/UL — HIGH (ref 3.8–10.5)

## 2021-03-18 RX ADMIN — Medication 20 MILLIGRAM(S): at 17:10

## 2021-03-18 RX ADMIN — Medication 8 UNIT(S): at 16:39

## 2021-03-18 RX ADMIN — TAMSULOSIN HYDROCHLORIDE 0.4 MILLIGRAM(S): 0.4 CAPSULE ORAL at 21:31

## 2021-03-18 RX ADMIN — PANTOPRAZOLE SODIUM 40 MILLIGRAM(S): 20 TABLET, DELAYED RELEASE ORAL at 06:14

## 2021-03-18 RX ADMIN — Medication 1 MILLIGRAM(S): at 11:39

## 2021-03-18 RX ADMIN — TIOTROPIUM BROMIDE 1 CAPSULE(S): 18 CAPSULE ORAL; RESPIRATORY (INHALATION) at 11:45

## 2021-03-18 RX ADMIN — Medication 2: at 21:30

## 2021-03-18 RX ADMIN — Medication 50 MILLIGRAM(S): at 06:14

## 2021-03-18 RX ADMIN — CEFTRIAXONE 100 MILLIGRAM(S): 500 INJECTION, POWDER, FOR SOLUTION INTRAMUSCULAR; INTRAVENOUS at 17:09

## 2021-03-18 RX ADMIN — Medication 8 UNIT(S): at 08:06

## 2021-03-18 RX ADMIN — Medication 10: at 11:41

## 2021-03-18 RX ADMIN — SIMVASTATIN 40 MILLIGRAM(S): 20 TABLET, FILM COATED ORAL at 21:31

## 2021-03-18 RX ADMIN — Medication 8 UNIT(S): at 12:01

## 2021-03-18 RX ADMIN — RIVAROXABAN 15 MILLIGRAM(S): KIT at 11:40

## 2021-03-18 RX ADMIN — INSULIN GLARGINE 10 UNIT(S): 100 INJECTION, SOLUTION SUBCUTANEOUS at 21:30

## 2021-03-18 RX ADMIN — Medication 81 MILLIGRAM(S): at 11:44

## 2021-03-18 RX ADMIN — Medication 20 MILLIGRAM(S): at 06:14

## 2021-03-18 RX ADMIN — Medication 8: at 16:16

## 2021-03-18 RX ADMIN — Medication 6: at 07:42

## 2021-03-18 RX ADMIN — Medication 200 MILLIGRAM(S): at 11:39

## 2021-03-19 ENCOUNTER — TRANSCRIPTION ENCOUNTER (OUTPATIENT)
Age: 76
End: 2021-03-19

## 2021-03-19 VITALS
HEART RATE: 70 BPM | OXYGEN SATURATION: 98 % | SYSTOLIC BLOOD PRESSURE: 161 MMHG | DIASTOLIC BLOOD PRESSURE: 81 MMHG | TEMPERATURE: 97 F | RESPIRATION RATE: 18 BRPM

## 2021-03-19 LAB
ALBUMIN SERPL ELPH-MCNC: 3.1 G/DL — LOW (ref 3.3–5)
ALP SERPL-CCNC: 83 U/L — SIGNIFICANT CHANGE UP (ref 40–120)
ALT FLD-CCNC: 329 U/L — HIGH (ref 12–78)
ANION GAP SERPL CALC-SCNC: 5 MMOL/L — SIGNIFICANT CHANGE UP (ref 5–17)
AST SERPL-CCNC: 202 U/L — HIGH (ref 15–37)
BILIRUB DIRECT SERPL-MCNC: 0.18 MG/DL — SIGNIFICANT CHANGE UP (ref 0.05–0.2)
BILIRUB INDIRECT FLD-MCNC: 0.8 MG/DL — SIGNIFICANT CHANGE UP (ref 0.2–1)
BILIRUB SERPL-MCNC: 1 MG/DL — SIGNIFICANT CHANGE UP (ref 0.2–1.2)
BUN SERPL-MCNC: 62 MG/DL — HIGH (ref 7–23)
CALCIUM SERPL-MCNC: 8.3 MG/DL — LOW (ref 8.5–10.1)
CHLORIDE SERPL-SCNC: 107 MMOL/L — SIGNIFICANT CHANGE UP (ref 96–108)
CO2 SERPL-SCNC: 28 MMOL/L — SIGNIFICANT CHANGE UP (ref 22–31)
CREAT SERPL-MCNC: 2.01 MG/DL — HIGH (ref 0.5–1.3)
CULTURE RESULTS: SIGNIFICANT CHANGE UP
CULTURE RESULTS: SIGNIFICANT CHANGE UP
GLUCOSE BLDC GLUCOMTR-MCNC: 265 MG/DL — HIGH (ref 70–99)
GLUCOSE BLDC GLUCOMTR-MCNC: 317 MG/DL — HIGH (ref 70–99)
GLUCOSE BLDC GLUCOMTR-MCNC: 333 MG/DL — HIGH (ref 70–99)
GLUCOSE SERPL-MCNC: 300 MG/DL — HIGH (ref 70–99)
HCT VFR BLD CALC: 44.7 % — SIGNIFICANT CHANGE UP (ref 39–50)
HGB BLD-MCNC: 13.8 G/DL — SIGNIFICANT CHANGE UP (ref 13–17)
MCHC RBC-ENTMCNC: 30.3 PG — SIGNIFICANT CHANGE UP (ref 27–34)
MCHC RBC-ENTMCNC: 30.9 GM/DL — LOW (ref 32–36)
MCV RBC AUTO: 98 FL — SIGNIFICANT CHANGE UP (ref 80–100)
NRBC # BLD: 0 /100 WBCS — SIGNIFICANT CHANGE UP (ref 0–0)
PLATELET # BLD AUTO: 173 K/UL — SIGNIFICANT CHANGE UP (ref 150–400)
POTASSIUM SERPL-MCNC: 5.3 MMOL/L — SIGNIFICANT CHANGE UP (ref 3.5–5.3)
POTASSIUM SERPL-SCNC: 5.3 MMOL/L — SIGNIFICANT CHANGE UP (ref 3.5–5.3)
PROT SERPL-MCNC: 6.6 GM/DL — SIGNIFICANT CHANGE UP (ref 6–8.3)
RBC # BLD: 4.56 M/UL — SIGNIFICANT CHANGE UP (ref 4.2–5.8)
RBC # FLD: 15.9 % — HIGH (ref 10.3–14.5)
SODIUM SERPL-SCNC: 140 MMOL/L — SIGNIFICANT CHANGE UP (ref 135–145)
SPECIMEN SOURCE: SIGNIFICANT CHANGE UP
SPECIMEN SOURCE: SIGNIFICANT CHANGE UP
WBC # BLD: 17.6 K/UL — HIGH (ref 3.8–10.5)
WBC # FLD AUTO: 17.6 K/UL — HIGH (ref 3.8–10.5)

## 2021-03-19 RX ORDER — SACUBITRIL AND VALSARTAN 24; 26 MG/1; MG/1
1 TABLET, FILM COATED ORAL
Qty: 0 | Refills: 0 | DISCHARGE

## 2021-03-19 RX ADMIN — Medication 200 MILLIGRAM(S): at 11:52

## 2021-03-19 RX ADMIN — Medication 20 MILLIGRAM(S): at 05:57

## 2021-03-19 RX ADMIN — TIOTROPIUM BROMIDE 1 CAPSULE(S): 18 CAPSULE ORAL; RESPIRATORY (INHALATION) at 11:53

## 2021-03-19 RX ADMIN — Medication 50 MILLIGRAM(S): at 05:58

## 2021-03-19 RX ADMIN — Medication 81 MILLIGRAM(S): at 11:53

## 2021-03-19 RX ADMIN — RIVAROXABAN 15 MILLIGRAM(S): KIT at 11:52

## 2021-03-19 RX ADMIN — Medication 8 UNIT(S): at 16:53

## 2021-03-19 RX ADMIN — Medication 1 MILLIGRAM(S): at 11:52

## 2021-03-19 RX ADMIN — Medication 8: at 11:50

## 2021-03-19 RX ADMIN — Medication 6: at 08:03

## 2021-03-19 RX ADMIN — Medication 8 UNIT(S): at 11:51

## 2021-03-19 RX ADMIN — PANTOPRAZOLE SODIUM 40 MILLIGRAM(S): 20 TABLET, DELAYED RELEASE ORAL at 05:58

## 2021-03-19 RX ADMIN — Medication 8 UNIT(S): at 08:14

## 2021-03-19 RX ADMIN — Medication 8: at 16:53

## 2021-03-19 NOTE — PROGRESS NOTE ADULT - SUBJECTIVE AND OBJECTIVE BOX
INTERVAL HPI:  74yo, BM with HTN, HLD, DM, CAD with CABG+ stents, Diastolic CHF, S/P AICD, COPD (on home o2 2L), Obesity, EMBER on CPAP,  Gout.   Presented  from home with sudden onset of sob, states that he was watching television when his symptoms occurred, shortly before he did ambulate to the bathroom +chest tightness (-) fevers or chills (-) cough (-) flu like symptoms, (-) leg edema. Notes CP.     In the field pt's O2 saturation was 69%, he was given dexamethasone and albuterol and placed on CPAP with improvement. Smoked close to 40 years, quit 20 years ago    OVERNIGHT EVENTS:  Comfortable    Vital Signs Last 24 Hrs  T(C): 36.7 (14 Mar 2021 16:08), Max: 36.7 (13 Mar 2021 23:14)  T(F): 98 (14 Mar 2021 16:08), Max: 98.1 (13 Mar 2021 23:14)  HR: 70 (14 Mar 2021 17:57) (70 - 91)  BP: 135/71 (14 Mar 2021 16:08) (126/76 - 159/72)  BP(mean): --  RR: 15 (14 Mar 2021 16:08) (15 - 20)  SpO2: 99% (14 Mar 2021 17:57) (97% - 99%)    PHYSICAL EXAM:  GEN:         Awake, responsive and comfortable.  HEENT:    Normal.    RESP:        no distress  CVS:             Regular rate and rhythm.   ABD:         Soft, non-tender, non-distended;     MEDICATIONS  (STANDING):  ALBUTerol    90 MICROgram(s) HFA Inhaler 1 Puff(s) Inhalation every 4 hours  allopurinol 200 milliGRAM(s) Oral daily  aspirin enteric coated 81 milliGRAM(s) Oral daily  cefTRIAXone   IVPB      cefTRIAXone   IVPB 1000 milliGRAM(s) IV Intermittent every 24 hours  dextrose 40% Gel 15 Gram(s) Oral once  dextrose 5%. 1000 milliLiter(s) (50 mL/Hr) IV Continuous <Continuous>  dextrose 50% Injectable 25 Gram(s) IV Push once  dextrose 50% Injectable 12.5 Gram(s) IV Push once  dextrose 50% Injectable 25 Gram(s) IV Push once  folic acid 1 milliGRAM(s) Oral daily  glucagon  Injectable 1 milliGRAM(s) IntraMuscular once  insulin lispro (ADMELOG) corrective regimen sliding scale   SubCutaneous three times a day before meals  insulin lispro (ADMELOG) corrective regimen sliding scale   SubCutaneous at bedtime  methylPREDNISolone sodium succinate Injectable 40 milliGRAM(s) IV Push every 8 hours  metoprolol succinate ER 50 milliGRAM(s) Oral daily  pantoprazole    Tablet 40 milliGRAM(s) Oral before breakfast  rivaroxaban 20 milliGRAM(s) Oral daily  simvastatin 40 milliGRAM(s) Oral at bedtime  tamsulosin 0.4 milliGRAM(s) Oral at bedtime  tiotropium 18 MICROgram(s) Capsule 1 Capsule(s) Inhalation daily      LABS:                        13.4   13.96 )-----------( 158      ( 14 Mar 2021 04:20 )             43.3     03-14    138  |  102  |  44<H>  ----------------------------<  347<H>  5.7<H>   |  30  |  2.34<H>    Ca    8.7      14 Mar 2021 04:17    TPro  7.4  /  Alb  3.4  /  TBili  0.7  /  DBili  x   /  AST  63<H>  /  ALT  56  /  AlkPhos  90  03-14    PT/INR - ( 13 Mar 2021 15:46 )   PT: 13.6 sec;   INR: 1.18 ratio         PTT - ( 13 Mar 2021 15:46 )  PTT:24.1 sec  13 @ 15:57  pH: 7.39  pCO2: 44  pO2: 91  SaO2: 97    Urinalysis Basic - ( 13 Mar 2021 23:45 )    Color: Yellow / Appearance: Clear / S.015 / pH: x  Gluc: x / Ketone: Negative  / Bili: Negative / Urobili: Negative mg/dL   Blood: x / Protein: 100 mg/dL / Nitrite: Negative   Leuk Esterase: Negative / RBC: 0-2 /HPF / WBC 0-2   Sq Epi: x / Non Sq Epi: Occasional / Bacteria: Occasional    ASSESSMENT AND PLAN:  ·	Acute on chronic hypoxic Respiratory failure.  ·	Acute on chronic diastolic CHF.  ·	Acute COPD exacerbation.  ·	Leukocytosis.  ·	Renal Insuffiencey.  ·	CAD with CABG+ stents.  ·	S/P AICD.  ·	Obesity.  ·	EMBER.  ·	HTN.  ·	HLD.  ·	DM.  ·	Gout.    Continue O2 and as needed BIPAP.  Steroids and bronchodilators.  Continue diuretics.  On rivaroxaban, reason not clear.  Follow Echocardiogram.
  INTERVAL HPI:  76yo, BM with HTN, HLD, DM, CAD with CABG+ stents, Diastolic CHF, S/P AICD, COPD (on home o2 2L), Obesity, EMBER on CPAP,  Gout.   Presented  from home with sudden onset of sob, states that he was watching television when his symptoms occurred, shortly before he did ambulate to the bathroom +chest tightness (-) fevers or chills (-) cough (-) flu like symptoms, (-) leg edema. Notes CP.     In the field pt's O2 saturation was 69%, he was given dexamethasone and albuterol and placed on CPAP with improvement. Smoked close to 40 years, quit 20 years ago    OVERNIGHT EVENTS:  Awake, comfortable and feels better    Vital Signs Last 24 Hrs  T(C): 36.8 (15 Mar 2021 10:42), Max: 36.8 (15 Mar 2021 10:42)  T(F): 98.2 (15 Mar 2021 10:42), Max: 98.2 (15 Mar 2021 10:42)  HR: 69 (15 Mar 2021 10:42) (69 - 77)  BP: 112/73 (15 Mar 2021 10:42) (112/73 - 135/71)  BP(mean): --  RR: 18 (15 Mar 2021 10:42) (15 - 18)  SpO2: 95% (15 Mar 2021 10:42) (95% - 100%)    PHYSICAL EXAM:  GEN:         Awake, responsive and comfortable.  HEENT:    Normal.    RESP:        no wheezing  CVS:          Regular rate and rhythm.   ABD:         Soft, non-tender, non-distended;     MEDICATIONS  (STANDING):  ALBUTerol    90 MICROgram(s) HFA Inhaler 1 Puff(s) Inhalation every 4 hours  allopurinol 200 milliGRAM(s) Oral daily  aspirin enteric coated 81 milliGRAM(s) Oral daily  cefTRIAXone   IVPB      cefTRIAXone   IVPB 1000 milliGRAM(s) IV Intermittent every 24 hours  dextrose 40% Gel 15 Gram(s) Oral once  dextrose 5%. 1000 milliLiter(s) (50 mL/Hr) IV Continuous <Continuous>  dextrose 50% Injectable 25 Gram(s) IV Push once  dextrose 50% Injectable 12.5 Gram(s) IV Push once  dextrose 50% Injectable 25 Gram(s) IV Push once  folic acid 1 milliGRAM(s) Oral daily  glucagon  Injectable 1 milliGRAM(s) IntraMuscular once  insulin glargine Injectable (LANTUS) 10 Unit(s) SubCutaneous at bedtime  insulin lispro (ADMELOG) corrective regimen sliding scale   SubCutaneous at bedtime  insulin lispro (ADMELOG) corrective regimen sliding scale   SubCutaneous three times a day before meals  methylPREDNISolone sodium succinate Injectable 40 milliGRAM(s) IV Push every 12 hours  metoprolol succinate ER 50 milliGRAM(s) Oral daily  pantoprazole    Tablet 40 milliGRAM(s) Oral before breakfast  rivaroxaban 15 milliGRAM(s) Oral daily  simvastatin 40 milliGRAM(s) Oral at bedtime  tamsulosin 0.4 milliGRAM(s) Oral at bedtime  tiotropium 18 MICROgram(s) Capsule 1 Capsule(s) Inhalation daily    LABS:                        12.8   17.38 )-----------( 158      ( 15 Mar 2021 08:09 )             41.4     03-15    138  |  101  |  63<H>  ----------------------------<  291<H>  5.2   |  29  |  2.43<H>    Ca    8.9      15 Mar 2021 08:09    TPro  7.4  /  Alb  3.4  /  TBili  0.7  /  DBili  x   /  AST  63<H>  /  ALT  56  /  AlkPhos  90      PT/INR - ( 13 Mar 2021 15:46 )   PT: 13.6 sec;   INR: 1.18 ratio      PTT - ( 13 Mar 2021 15:46 )  PTT:24.1 sec   @ 15:57  pH: 7.39  pCO2: 44  pO2: 91  SaO2: 97    Urinalysis Basic - ( 13 Mar 2021 23:45 )    Color: Yellow / Appearance: Clear / S.015 / pH: x  Gluc: x / Ketone: Negative  / Bili: Negative / Urobili: Negative mg/dL   Blood: x / Protein: 100 mg/dL / Nitrite: Negative   Leuk Esterase: Negative / RBC: 0-2 /HPF / WBC 0-2   Sq Epi: x / Non Sq Epi: Occasional / Bacteria: Occasional  < from: TTE Echo Complete w/o Contrast w/ Doppler (21 @ 14:40) >   EXAM:  ECHO TTE WO CON COMP W DOPP      PROCEDURE DATE:  2021      INTERPRETATION:  REPORT:  TRANSTHORACIC ECHOCARDIOGRAM REPORT    Patient Name:   JO VARELA Patient Location: [facility] Rec #:  [id number]    Accession #:      [id number]  Account #:                    Height:           69.7 in 177.0 cm  YOB: 1945     Weight:           244.7 lb 111.00 kg  Patient Age:    75 years      BSA:              2.27 m²  Patient Gender: M             BP:               126/76 mmHg      Date of Exam:        3/14/2021 2:40:18 PM  Sonographer:         KIRK  Referring Physician: MARNI    Procedure:     2D Echo/Doppler/Color Doppler Complete.  Indications:   Dyspnea, unspecified - R06.00  Diagnosis:     Dyspnea, unspecified - R06.00  Study Details: Technically difficult study.    2D AND M-MODE MEASUREMENTS (normal ranges within parentheses):  Left Ventricle:                  Normal   Aorta/Left Atrium:          Normal  IVSd (2D):              1.28 cm (0.7-1.1) AorticRoot (2D):  3.75 cm (2.4-3.7)  LVPWd (2D):             1.29 cm (0.7-1.1) Left Atrium (2D):  4.50 cm (1.9-4.0)  LVIDd (2D):             5.37 cm (3.4-5.7) Right Ventricle:  LVIDs (2D):             4.20 cm           TAPSE:           1.12 cm  LV FS (2D):            21.8 %   (>25%)  Relative Wall Thickness  0.48    (<0.42)    LV DIASTOLIC FUNCTION:  MV Peak E: 1.04 m/s Decel Time:  308 msec  Septal e'  0.0 m/s  Septal E/e'  21.6  Lateral e' 0.1 m/s  Lateral E/e' 14.7    SPECTRAL DOPPLER ANALYSIS (where applicable):  Mitral Valve:  MV P1/2 Time: 89.34 msec  MV Area, PHT: 2.46 cm²    Aortic Valve: AoV Max Raphael: 2.28 m/s AoV Peak P.8 mmHg AoV Mean PG: 10.2 mmHg    LVOT Vmax: 0.66 m/s LVOT VTI: 0.144 m LVOT Diameter: 2.05 cm    AoV Area, Vmax: 0.95 cm² AoV Area, VTI: 1.17 cm² AoV Area, Vmn: 1.16 cm²    Aortic Insufficiency:  AI Half-time:  321 msec  AI Decel Rate: 2.97 m/s²    Tricuspid Valve and PA/RV Systolic Pressure: TR Max Velocity: 2.95 m/s RA Pressure: 8 mmHg RVSP/PASP: 42.7 mmHg    Pulmonic Valve:  PV Max Velocity: 0.68 m/s PV Max P.9 mmHg PV Mean PG:    PHYSICIAN INTERPRETATION:  Left Ventricle: The left ventricular internal cavity size is normal.  Global LV systolic function was moderately decreased. Left ventricular ejection fraction, by visual estimation, is 35 to 40%. Spectral Doppler shows pseudonormal pattern of left ventricular myocardial filling (Grade II diastolic dysfunction). Elevated left ventricular end-diastolic pressure.    LV Wall Scoring:  The mid andapical anterior wall and mid anteroseptal segment are hypokinetic.    Right Ventricle: Normal right ventricular size and function.  Left Atrium: Mildly enlarged left atrium.  Right Atrium: Mildly enlarged right atrium.  Pericardium: There is no evidence of pericardial effusion.  Mitral Valve: Mild thickening and calcification of the anterior and posterior mitral valve leaflets. Mitral leaflet mobility is normal. Trace mitral valve regurgitation is seen.  Tricuspid Valve: The tricuspid valve is not well visualized. Trivial tricuspid regurgitation is visualized. Estimated pulmonary artery systolic pressure is 42.7 mmHg assuming a right atrial pressure of 8 mmHg, which is consistent with mild pulmonary hypertension.  Aortic Valve: The aortic valve was not well visualized. Moderate aortic stenosis is present. Peak transaortic gradient equals 20.8 mmHg, mean transaortic gradient equals 10.2 mmHg, the calculated aortic valve area equals 1.17 cm² by the continuity equation consistent with moderate aortic stenosis. Moderate aortic valve regurgitation is seen.  Pulmonic Valve: The pulmonic valve was not well visualized. Mild pulmonic valve regurgitation.  Aorta: Aortic root measured at Sinus of Valsalva is normal.    Summary:   1. Left ventricular ejection fraction, by visual estimation, is 35 to 40%.   2. Technically difficult study.   3. Moderately decreased global left ventricular systolic function.   4. Mid and apical anterior wall and mid anteroseptal segment are abnormal as described above.   5. Elevated left ventricular end-diastolic pressure.   6. Normal left ventricular internal cavity size.   7. Spectral Doppler shows pseudonormal pattern of left ventricular myocardial filling (Grade II diastolic dysfunction).   8. Normal right ventricular size and function.   9. Mildly enlarged left atrium.  10. Mildly enlarged right atrium.  11. There is no evidence of pericardial effusion.  12. Mild thickening and calcification of the anterior and posterior mitral valve leaflets.  13. Trace mitral valve regurgitation.  14. Moderate aortic regurgitation.  15. Moderate aortic valve stenosis.  16. Mild pulmonic valve regurgitation.  17. Estimated pulmonary artery systolic pressure is 42.7 mmHg assuming a right atrial pressure of 8 mmHg, which is consistent with mild pulmonary hypertension.  18. Consider limited Definity echocontrast follow up study to better evaluate left ventricular function.  19. Peak transaortic gradient equals 20.8 mmHg, mean transaortic gradient equals 10.2mmHg, the calculated aortic valve area equals 1.17 cm² by the continuity equation consistent with moderate aortic stenosis.    Elizabeth Phipps MD FACC, FASE, FACP  Electronically signed on 3/15/2021 at 12:12:05 PM    ELIZABETH PHIPPS   This document has been electronically signed. Mar 14 2021  2:40PM    ASSESSMENT AND PLAN:  ·	Acute on chronic hypoxic Respiratory failure.  ·	Acute on chronic systolic + diastolic CHF.  ·	Aortic Regurgitation+ Stenosis.  ·	Acute COPD exacerbation.  ·	Leukocytosis.  ·	Renal Insuffiencey.  ·	CAD with CABG+ stents.  ·	S/P AICD.  ·	Obesity.  ·	EMBER.  ·	HTN.  ·	HLD.  ·	DM.  ·	Gout.    ECho noted showing both systolic and diastolic dysfunction.  Dr Deras to call Cardiology evaluation. Xarelto reason not clear.  
  INTERVAL HPI:  76yo, BM with HTN, HLD, DM, CAD with CABG+ stents, Diastolic CHF, S/P AICD, COPD (on home o2 2L), Obesity, EMBRE on CPAP,  Gout.   Presented  from home with sudden onset of sob, states that he was watching television when his symptoms occurred, shortly before he did ambulate to the bathroom +chest tightness (-) fevers or chills (-) cough (-) flu like symptoms, (-) leg edema. Notes CP.     In the field pt's O2 saturation was 69%, he was given dexamethasone and albuterol and placed on CPAP with improvement. Smoked close to 40 years, quit 20 years ago    OVERNIGHT EVENTS:  Comfortable    Vital Signs Last 24 Hrs  T(C): 36.1 (19 Mar 2021 16:08), Max: 36.7 (19 Mar 2021 04:47)  T(F): 97 (19 Mar 2021 16:08), Max: 98 (19 Mar 2021 04:47)  HR: 70 (19 Mar 2021 16:08) (65 - 80)  BP: 161/81 (19 Mar 2021 16:08) (151/72 - 173/83)  BP(mean): --  RR: 18 (19 Mar 2021 16:08) (16 - 18)  SpO2: 98% (19 Mar 2021 16:08) (95% - 98%)    PHYSICAL EXAM:  GEN:         Awake, responsive and comfortable.  HEENT:    Normal.    RESP:        no distress  CVS:          Regular rate and rhythm.   ABD:         Soft, non-tender, non-distended;     MEDICATIONS  (STANDING):  ALBUTerol    90 MICROgram(s) HFA Inhaler 1 Puff(s) Inhalation every 4 hours  allopurinol 200 milliGRAM(s) Oral daily  aspirin enteric coated 81 milliGRAM(s) Oral daily  dextrose 40% Gel 15 Gram(s) Oral once  dextrose 5%. 1000 milliLiter(s) (50 mL/Hr) IV Continuous <Continuous>  dextrose 50% Injectable 25 Gram(s) IV Push once  dextrose 50% Injectable 12.5 Gram(s) IV Push once  dextrose 50% Injectable 25 Gram(s) IV Push once  folic acid 1 milliGRAM(s) Oral daily  glucagon  Injectable 1 milliGRAM(s) IntraMuscular once  insulin glargine Injectable (LANTUS) 10 Unit(s) SubCutaneous at bedtime  insulin lispro (ADMELOG) corrective regimen sliding scale   SubCutaneous three times a day before meals  insulin lispro (ADMELOG) corrective regimen sliding scale   SubCutaneous at bedtime  insulin lispro Injectable (ADMELOG) 8 Unit(s) SubCutaneous three times a day before meals  methylPREDNISolone sodium succinate Injectable 20 milliGRAM(s) IV Push every 12 hours  metoprolol succinate ER 50 milliGRAM(s) Oral daily  pantoprazole    Tablet 40 milliGRAM(s) Oral before breakfast  rivaroxaban 15 milliGRAM(s) Oral daily  simvastatin 40 milliGRAM(s) Oral at bedtime  tamsulosin 0.4 milliGRAM(s) Oral at bedtime  tiotropium 18 MICROgram(s) Capsule 1 Capsule(s) Inhalation daily    LABS:                        13.8   17.60 )-----------( 173      ( 19 Mar 2021 09:41 )             44.7     03-19    140  |  107  |  62<H>  ----------------------------<  300<H>  5.3   |  28  |  2.01<H>    Ca    8.3<L>      19 Mar 2021 09:41  Phos  3.8     03-18  Mg     2.7     03-18    TPro  6.6  /  Alb  3.1<L>  /  TBili  1.0  /  DBili  .18  /  AST  202<H>  /  ALT  329<H>  /  AlkPhos  83  03-19    03-13 @ 15:57  pH: 7.39  pCO2: 44  pO2: 91  SaO2: 97  ASSESSMENT AND PLAN:  ·	Acute on chronic hypoxic Respiratory failure.  ·	Acute on chronic systolic + diastolic CHF.  ·	Aortic Regurgitation+ Stenosis.  ·	Acute COPD exacerbation.  ·	Leukocytosis.  ·	Renal Insuffiencey.  ·	CAD with CABG+ stents.  ·	S/P AICD.  ·	Obesity.  ·	EMBER.  ·	HTN.  ·	HLD.  ·	DM.  ·	Gout.    Pulmonary status better.  Taper steroids over 5-7 days.  Bronchodilators as needed.  What pt tries to explain about Rivaroxaban, appears as has underlying ;paroxysmal  A fib and was started by his out pt Cardiologist.  
74yo, BM with h/o HTN, DM, COPD (on home o2 2L), CHF, PPM/AICD presents today biba from home with sudden onset of sob, states that he was watching television when his symptoms occurred, shortly before he did ambulate to the bathroom +chest tightness (-) fevers or chills (-) cough (-) flu like symptoms, (-) leg edema. Notes CP.   In the field pt's o2 saturation was 69%, he was given dexamethasone and albuterol and placed on cpap with improvement   (13 Mar 2021 18:29)      Chief Complaint:  Patient is a 75y old  Male who presents with a chief complaint of Shortness of breath (16 Mar 2021 18:58)      Review of Systems:    General:  No wt loss, fevers, chills, night sweats  Eyes:  Good vision, no reported pain  ENT:  No sore throat, pain, runny nose, dysphagia  CV:  No pain, palpitations, hypo/hypertension  Resp:  No dyspnea, cough, tachypnea, wheezing  GI:  No pain, nausea, vomiting, diarrhea, constipation             Social History/Family History  SOCHX:   tobacco,  -  alcohol    FMHX: FA/MO  - contributory       Discussed with:  PMD, Family    Physical Exam:    Vital Signs:  Vital Signs Last 24 Hrs  T(C): 36.7 (16 Mar 2021 16:20), Max: 36.7 (16 Mar 2021 16:20)  T(F): 98 (16 Mar 2021 16:20), Max: 98 (16 Mar 2021 16:20)  HR: 69 (16 Mar 2021 18:36) (66 - 85)  BP: 132/75 (16 Mar 2021 16:20) (104/61 - 132/75)  BP(mean): --  RR: 18 (16 Mar 2021 16:20) (17 - 18)  SpO2: 99% (16 Mar 2021 18:36) (94% - 100%)  Daily     Daily Weight in k.2 (16 Mar 2021 04:50)  I&O's Summary    15 Mar 2021 07:01  -  16 Mar 2021 07:00  --------------------------------------------------------  IN: 0 mL / OUT: 400 mL / NET: -400 mL    16 Mar 2021 07:01  -  16 Mar 2021 21:11  --------------------------------------------------------  IN: 0 mL / OUT: 1100 mL / NET: -1100 mL          Chest:  Full & symmetric excursion, no increased effort, breath sounds clear  Cardiovascular:  Regular rhythm, S1, S2, no murmur/rub/S3/S4, no carotid/femoral/abdominal bruit, radial/pedal pulses 2+,  Abdomen:  Soft, non-tender, non-distended, normoactive bowel sounds, no HSM      Laboratory:                          12.6   16.86 )-----------( 157      ( 16 Mar 2021 08:02 )             40.1     03-16    138  |  101  |  69<H>  ----------------------------<  285<H>  5.3   |  30  |  2.28<H>    Ca    8.6      16 Mar 2021 08:02  Phos  3.2     03-16  Mg     2.6     03-16            CAPILLARY BLOOD GLUCOSE      POCT Blood Glucose.: 446 mg/dL (16 Mar 2021 16:17)  POCT Blood Glucose.: 337 mg/dL (16 Mar 2021 11:16)  POCT Blood Glucose.: 265 mg/dL (16 Mar 2021 07:47)        Assessment:  I am asked to consult this patient with an admitting diagnosis of acute shortness of breath  Pulmonary consultation and workup is ongoing and noted  the patient's diagnostic echocardiogram revealed a reduced ejection fraction as compared to a previous echocardiogram in 2019  However, the patient does have an ACID implanted and the EF is likely chronically decreased  The patient's mildly elevated troponin level is not likely to be indicative of a myocardial infarction  A nuclear stress test is noted and no significant ischemia is seen     Medical management as written will continue  Continue the current pulmonary and other medical management as written  DC     
74yo, BM with h/o HTN, DM, COPD (on home o2 2L), CHF, PPM/AICD presents today biba from home with sudden onset of sob, states that he was watching television when his symptoms occurred, shortly before he did ambulate to the bathroom +chest tightness (-) fevers or chills (-) cough (-) flu like symptoms, (-) leg edema. Notes CP.   In the field pt's o2 saturation was 69%, he was given dexamethasone and albuterol and placed on cpap with improvement   (13 Mar 2021 18:29)      Chief Complaint:  Patient is a 75y old  Male who presents with a chief complaint of Shortness of breath (16 Mar 2021 18:58)      Review of Systems:    General:  No wt loss, fevers, chills, night sweats  Eyes:  Good vision, no reported pain  ENT:  No sore throat, pain, runny nose, dysphagia  CV:  No pain, palpitations, hypo/hypertension  Resp:  No dyspnea, cough, tachypnea, wheezing  GI:  No pain, nausea, vomiting, diarrhea, constipation             Social History/Family History  SOCHX:   tobacco,  -  alcohol    FMHX: FA/MO  - contributory       Discussed with:  PMD, Family    Physical Exam:    Vital Signs:  Vital Signs Last 24 Hrs  T(C): 36.7 (16 Mar 2021 16:20), Max: 36.7 (16 Mar 2021 16:20)  T(F): 98 (16 Mar 2021 16:20), Max: 98 (16 Mar 2021 16:20)  HR: 69 (16 Mar 2021 18:36) (66 - 85)  BP: 132/75 (16 Mar 2021 16:20) (104/61 - 132/75)  BP(mean): --  RR: 18 (16 Mar 2021 16:20) (17 - 18)  SpO2: 99% (16 Mar 2021 18:36) (94% - 100%)  Daily     Daily Weight in k.2 (16 Mar 2021 04:50)  I&O's Summary    15 Mar 2021 07:01  -  16 Mar 2021 07:00  --------------------------------------------------------  IN: 0 mL / OUT: 400 mL / NET: -400 mL    16 Mar 2021 07:01  -  16 Mar 2021 21:11  --------------------------------------------------------  IN: 0 mL / OUT: 1100 mL / NET: -1100 mL          Chest:  Full & symmetric excursion, no increased effort, breath sounds clear  Cardiovascular:  Regular rhythm, S1, S2, no murmur/rub/S3/S4, no carotid/femoral/abdominal bruit, radial/pedal pulses 2+,  Abdomen:  Soft, non-tender, non-distended, normoactive bowel sounds, no HSM      Laboratory:                          12.6   16.86 )-----------( 157      ( 16 Mar 2021 08:02 )             40.1     03-16    138  |  101  |  69<H>  ----------------------------<  285<H>  5.3   |  30  |  2.28<H>    Ca    8.6      16 Mar 2021 08:02  Phos  3.2     03-16  Mg     2.6     03-16            CAPILLARY BLOOD GLUCOSE      POCT Blood Glucose.: 446 mg/dL (16 Mar 2021 16:17)  POCT Blood Glucose.: 337 mg/dL (16 Mar 2021 11:16)  POCT Blood Glucose.: 265 mg/dL (16 Mar 2021 07:47)        Assessment:  I am asked to consult this patient with an admitting diagnosis of acute shortness of breath  Pulmonary consultation and workup is ongoing and noted  the patient's diagnostic echocardiogram revealed a reduced ejection fraction as compared to a previous echocardiogram in 2019  However, the patient does have an ACID implanted and the EF is likely chronically decreased  The patient's mildly elevated troponin level is not likely to be indicative of a myocardial infarction   however given the patient's new finding On diagnostic echocardiogram with stated wall motion abnormalities ischemic workup is suggested  A nuclear stress test is noted and no significant ischemia is seen     Medical management as written will continue  Continue the current pulmonary and other medical management as written             
[facility] NEPHROLOGY SERVICES, [facility]  NEPHROLOGY AND HYPERTENSION  [address]  [address]  [place] [address]  [phone]    MD DANIEL GARRETT MD ANDREY GONCHARUK, MD MADHU KORRAPATI, MD YELENA ROSENBERG, MD BINNY KOSHY, MD CHRISTOPHER CAPUTO, MD EDWARD BOVER, MD          Patient events noted    MEDICATIONS  (STANDING):  ALBUTerol    90 MICROgram(s) HFA Inhaler 1 Puff(s) Inhalation every 4 hours  allopurinol 200 milliGRAM(s) Oral daily  aspirin enteric coated 81 milliGRAM(s) Oral daily  cefTRIAXone   IVPB      cefTRIAXone   IVPB 1000 milliGRAM(s) IV Intermittent every 24 hours  dextrose 40% Gel 15 Gram(s) Oral once  dextrose 5%. 1000 milliLiter(s) (50 mL/Hr) IV Continuous <Continuous>  dextrose 50% Injectable 25 Gram(s) IV Push once  dextrose 50% Injectable 12.5 Gram(s) IV Push once  dextrose 50% Injectable 25 Gram(s) IV Push once  folic acid 1 milliGRAM(s) Oral daily  glucagon  Injectable 1 milliGRAM(s) IntraMuscular once  insulin glargine Injectable (LANTUS) 10 Unit(s) SubCutaneous at bedtime  insulin lispro (ADMELOG) corrective regimen sliding scale   SubCutaneous at bedtime  insulin lispro (ADMELOG) corrective regimen sliding scale   SubCutaneous three times a day before meals  insulin lispro Injectable (ADMELOG) 8 Unit(s) SubCutaneous three times a day before meals  methylPREDNISolone sodium succinate Injectable 20 milliGRAM(s) IV Push every 12 hours  metoprolol succinate ER 50 milliGRAM(s) Oral daily  pantoprazole    Tablet 40 milliGRAM(s) Oral before breakfast  rivaroxaban 15 milliGRAM(s) Oral daily  simvastatin 40 milliGRAM(s) Oral at bedtime  tamsulosin 0.4 milliGRAM(s) Oral at bedtime  tiotropium 18 MICROgram(s) Capsule 1 Capsule(s) Inhalation daily    MEDICATIONS  (PRN):      03-16-21 @ 07:01  -  03-17-21 @ 07:00  --------------------------------------------------------  IN: 0 mL / OUT: 1600 mL / NET: -1600 mL      PHYSICAL EXAM:      T(C): 36.4 (03-17-21 @ 16:40), Max: 36.8 (03-17-21 @ 00:16)  HR: 75 (03-17-21 @ 20:42) (68 - 75)  BP: 134/73 (03-17-21 @ 16:40) (134/73 - 175/90)  RR: 18 (03-17-21 @ 16:40) (18 - 18)  SpO2: 97% (03-17-21 @ 20:42) (92% - 100%)  Wt(kg): --  Lungs clear  Heart S1S2  Abd soft NT ND  Extremities:   tr edema                                    13.0   16.63 )-----------( 157      ( 17 Mar 2021 06:34 )             42.8     03-17    137  |  103  |  74<H>  ----------------------------<  321<H>  5.1   |  29  |  2.26<H>    Ca    8.6      17 Mar 2021 06:34  Phos  4.1     03-17  Mg     2.8     03-17    TPro  6.9  /  Alb  3.3  /  TBili  0.7  /  DBili  x   /  AST  65<H>  /  ALT  84<H>  /  AlkPhos  91  03-17      LIVER FUNCTIONS - ( 17 Mar 2021 06:34 )  Alb: 3.3 g/dL / Pro: 6.9 gm/dL / ALK PHOS: 91 U/L / ALT: 84 U/L / AST: 65 U/L / GGT: x           Creatinine Trend: 2.26<--, 2.28<--, 2.43<--, 2.34<--, 2.22<--    < from: NM Pharm Stress Test, Dual Isotope (03.17.21 @ 12:40) >      IMPRESSION: Abnormal myocardial perfusion scan.    1. There was scintigraphic evidence for a myocardial infarct in the RCA territory with no significant ischemia.    2. There is severely abnormal left ventricular contractility, globally diminished calculated ejection fraction and no wall motion abnormlaities. Overall post stress ejection fraction was 21%    3. Please see the cardiac test report for EKG findings and symptoms during the procedure    < end of copied text >      Assessment   CKD 3; CRS; CHF  PNA  Stable     Plan:  Continue current rx  Cardiology recs   Will follow.      oJn Rowe MD
  INTERVAL HPI:   76yo, BM with HTN, HLD, DM, CAD with CABG+ stents, Diastolic CHF, S/P AICD, COPD (on home o2 2L), Obesity, EMBER on CPAP,  Gout.   Presented  from home with sudden onset of sob, states that he was watching television when his symptoms occurred, shortly before he did ambulate to the bathroom +chest tightness (-) fevers or chills (-) cough (-) flu like symptoms, (-) leg edema. Notes CP.     In the field pt's O2 saturation was 69%, he was given dexamethasone and albuterol and placed on CPAP with improvement. Smoked close to 40 years, quit 20 years ago    OVERNIGHT EVENTS:  Feels better.    Vital Signs Last 24 Hrs  T(C): 36.7 (16 Mar 2021 16:20), Max: 36.7 (16 Mar 2021 16:20)  T(F): 98 (16 Mar 2021 16:20), Max: 98 (16 Mar 2021 16:20)  HR: 70 (16 Mar 2021 16:20) (66 - 85)  BP: 132/75 (16 Mar 2021 16:20) (104/61 - 132/75)  BP(mean): --  RR: 18 (16 Mar 2021 16:20) (17 - 18)  SpO2: 94% (16 Mar 2021 16:20) (94% - 100%)    PHYSICAL EXAM:  GEN:         Awake, responsive and comfortable.  HEENT:    Normal.    RESP:        no distress  CVS:          Regular rate and rhythm.   ABD:         Soft, non-tender, non-distended;     MEDICATIONS  (STANDING):  ALBUTerol    90 MICROgram(s) HFA Inhaler 1 Puff(s) Inhalation every 4 hours  allopurinol 200 milliGRAM(s) Oral daily  aspirin enteric coated 81 milliGRAM(s) Oral daily  cefTRIAXone   IVPB      cefTRIAXone   IVPB 1000 milliGRAM(s) IV Intermittent every 24 hours  dextrose 40% Gel 15 Gram(s) Oral once  dextrose 5%. 1000 milliLiter(s) (50 mL/Hr) IV Continuous <Continuous>  dextrose 50% Injectable 25 Gram(s) IV Push once  dextrose 50% Injectable 12.5 Gram(s) IV Push once  dextrose 50% Injectable 25 Gram(s) IV Push once  folic acid 1 milliGRAM(s) Oral daily  glucagon  Injectable 1 milliGRAM(s) IntraMuscular once  insulin glargine Injectable (LANTUS) 10 Unit(s) SubCutaneous at bedtime  insulin lispro (ADMELOG) corrective regimen sliding scale   SubCutaneous at bedtime  insulin lispro (ADMELOG) corrective regimen sliding scale   SubCutaneous three times a day before meals  methylPREDNISolone sodium succinate Injectable 40 milliGRAM(s) IV Push every 12 hours  metoprolol succinate ER 50 milliGRAM(s) Oral daily  pantoprazole    Tablet 40 milliGRAM(s) Oral before breakfast  rivaroxaban 15 milliGRAM(s) Oral daily  simvastatin 40 milliGRAM(s) Oral at bedtime  tamsulosin 0.4 milliGRAM(s) Oral at bedtime  tiotropium 18 MICROgram(s) Capsule 1 Capsule(s) Inhalation daily    LABS:                        12.6   16.86 )-----------( 157      ( 16 Mar 2021 08:02 )             40.1     03-16    138  |  101  |  69<H>  ----------------------------<  285<H>  5.3   |  30  |  2.28<H>    Ca    8.6      16 Mar 2021 08:02  Phos  3.2     03-16  Mg     2.6     03-16    03-13 @ 15:57  pH: 7.39  pCO2: 44  pO2: 91  SaO2: 97  ASSESSMENT AND PLAN:  ·	Acute on chronic hypoxic Respiratory failure.  ·	Acute on chronic systolic + diastolic CHF.  ·	Aortic Regurgitation+ Stenosis.  ·	Acute COPD exacerbation.  ·	Leukocytosis.  ·	Renal Insuffiencey.  ·	CAD with CABG+ stents.  ·	S/P AICD.  ·	Obesity.  ·	EMBER.  ·	HTN.  ·	HLD.  ·	DM.  ·	Gout.    Taper steroids, will add Symbicort.  Continue nebulizer.  On antibiotics.    
  INTERVAL HPI:  74yo, BM with HTN, HLD, DM, CAD with CABG+ stents, Diastolic CHF, S/P AICD, COPD (on home o2 2L), Obesity, EMBER on CPAP,  Gout.   Presented  from home with sudden onset of sob, states that he was watching television when his symptoms occurred, shortly before he did ambulate to the bathroom +chest tightness (-) fevers or chills (-) cough (-) flu like symptoms, (-) leg edema. Notes CP.     In the field pt's O2 saturation was 69%, he was given dexamethasone and albuterol and placed on CPAP with improvement. Smoked close to 40 years, quit 20 years ago    OVERNIGHT EVENTS:  Awake and comfortable    Vital Signs Last 24 Hrs  T(C): 36.4 (18 Mar 2021 17:12), Max: 36.6 (18 Mar 2021 04:57)  T(F): 97.5 (18 Mar 2021 17:12), Max: 97.9 (18 Mar 2021 04:57)  HR: 65 (18 Mar 2021 17:20) (60 - 82)  BP: 173/83 (18 Mar 2021 17:12) (152/79 - 173/83)  BP(mean): --  RR: 18 (18 Mar 2021 17:12) (18 - 18)  SpO2: 95% (18 Mar 2021 17:20) (95% - 98%)    PHYSICAL EXAM:  GEN:         Awake, responsive and comfortable.  HEENT:    Normal.    RESP:        no distress  CVS:          Regular rate and rhythm.   ABD:         Soft, non-tender, non-distended;     MEDICATIONS  (STANDING):  ALBUTerol    90 MICROgram(s) HFA Inhaler 1 Puff(s) Inhalation every 4 hours  allopurinol 200 milliGRAM(s) Oral daily  aspirin enteric coated 81 milliGRAM(s) Oral daily  dextrose 40% Gel 15 Gram(s) Oral once  dextrose 5%. 1000 milliLiter(s) (50 mL/Hr) IV Continuous <Continuous>  dextrose 50% Injectable 25 Gram(s) IV Push once  dextrose 50% Injectable 12.5 Gram(s) IV Push once  dextrose 50% Injectable 25 Gram(s) IV Push once  folic acid 1 milliGRAM(s) Oral daily  glucagon  Injectable 1 milliGRAM(s) IntraMuscular once  insulin glargine Injectable (LANTUS) 10 Unit(s) SubCutaneous at bedtime  insulin lispro (ADMELOG) corrective regimen sliding scale   SubCutaneous at bedtime  insulin lispro (ADMELOG) corrective regimen sliding scale   SubCutaneous three times a day before meals  insulin lispro Injectable (ADMELOG) 8 Unit(s) SubCutaneous three times a day before meals  methylPREDNISolone sodium succinate Injectable 20 milliGRAM(s) IV Push every 12 hours  metoprolol succinate ER 50 milliGRAM(s) Oral daily  pantoprazole    Tablet 40 milliGRAM(s) Oral before breakfast  rivaroxaban 15 milliGRAM(s) Oral daily  simvastatin 40 milliGRAM(s) Oral at bedtime  tamsulosin 0.4 milliGRAM(s) Oral at bedtime  tiotropium 18 MICROgram(s) Capsule 1 Capsule(s) Inhalation daily    LABS:                        13.6   14.79 )-----------( 153      ( 18 Mar 2021 09:15 )             43.7     03-18    142  |  107  |  70<H>  ----------------------------<  312<H>  5.0   |  29  |  2.12<H>    Ca    8.5      18 Mar 2021 09:15  Phos  3.8     03-18  Mg     2.7     03-18    TPro  6.9  /  Alb  3.3  /  TBili  0.7  /  DBili  x   /  AST  234<H>  /  ALT  282<H>  /  AlkPhos  98  03-18    03-13 @ 15:57  pH: 7.39  pCO2: 44  pO2: 91  SaO2: 97    ASSESSMENT AND PLAN:  ·	Acute on chronic hypoxic Respiratory failure.  ·	Acute on chronic systolic + diastolic CHF.  ·	Aortic Regurgitation+ Stenosis.  ·	Acute COPD exacerbation.  ·	Leukocytosis.  ·	Renal Insuffiencey.  ·	CAD with CABG+ stents.  ·	S/P AICD.  ·	Obesity.  ·	EMBER.  ·	HTN.  ·	HLD.  ·	DM.  ·	Gout.    Continue steroid taper, start Symbicort.  Nebulizer as needed.  On empiric antibiotics.  Rivaroxaban reason not clear
  INTERVAL HPI:  74yo, BM with HTN, HLD, DM, CAD with CABG+ stents, Diastolic CHF, S/P AICD, COPD (on home o2 2L), Obesity, EMBER on CPAP,  Gout.   Presented  from home with sudden onset of sob, states that he was watching television when his symptoms occurred, shortly before he did ambulate to the bathroom +chest tightness (-) fevers or chills (-) cough (-) flu like symptoms, (-) leg edema. Notes CP.     In the field pt's O2 saturation was 69%, he was given dexamethasone and albuterol and placed on CPAP with improvement. Smoked close to 40 years, quit 20 years ago    OVERNIGHT EVENTS:  Comfortable in bed.    Vital Signs Last 24 Hrs  T(C): 36.4 (17 Mar 2021 16:40), Max: 36.8 (17 Mar 2021 00:16)  T(F): 97.6 (17 Mar 2021 16:40), Max: 98.3 (17 Mar 2021 00:16)  HR: 70 (17 Mar 2021 16:40) (65 - 71)  BP: 134/73 (17 Mar 2021 16:40) (134/73 - 175/90)  BP(mean): --  RR: 18 (17 Mar 2021 16:40) (18 - 18)  SpO2: 96% (17 Mar 2021 16:40) (92% - 100%)    PHYSICAL EXAM:  GEN:        comfortable.  HEENT:    Normal.    RESP:       no distress  CVS:          Regular rate and rhythm.   ABD:         Soft, non-tender, non-distended;     MEDICATIONS  (STANDING):  ALBUTerol    90 MICROgram(s) HFA Inhaler 1 Puff(s) Inhalation every 4 hours  allopurinol 200 milliGRAM(s) Oral daily  aspirin enteric coated 81 milliGRAM(s) Oral daily  cefTRIAXone   IVPB      cefTRIAXone   IVPB 1000 milliGRAM(s) IV Intermittent every 24 hours  dextrose 40% Gel 15 Gram(s) Oral once  dextrose 5%. 1000 milliLiter(s) (50 mL/Hr) IV Continuous <Continuous>  dextrose 50% Injectable 25 Gram(s) IV Push once  dextrose 50% Injectable 12.5 Gram(s) IV Push once  dextrose 50% Injectable 25 Gram(s) IV Push once  folic acid 1 milliGRAM(s) Oral daily  glucagon  Injectable 1 milliGRAM(s) IntraMuscular once  insulin glargine Injectable (LANTUS) 10 Unit(s) SubCutaneous at bedtime  insulin lispro (ADMELOG) corrective regimen sliding scale   SubCutaneous at bedtime  insulin lispro (ADMELOG) corrective regimen sliding scale   SubCutaneous three times a day before meals  insulin lispro Injectable (ADMELOG) 8 Unit(s) SubCutaneous three times a day before meals  methylPREDNISolone sodium succinate Injectable 20 milliGRAM(s) IV Push every 12 hours  metoprolol succinate ER 50 milliGRAM(s) Oral daily  pantoprazole    Tablet 40 milliGRAM(s) Oral before breakfast  rivaroxaban 15 milliGRAM(s) Oral daily  simvastatin 40 milliGRAM(s) Oral at bedtime  tamsulosin 0.4 milliGRAM(s) Oral at bedtime  tiotropium 18 MICROgram(s) Capsule 1 Capsule(s) Inhalation daily    LABS:                        13.0   16.63 )-----------( 157      ( 17 Mar 2021 06:34 )             42.8     03-17    137  |  103  |  74<H>  ----------------------------<  321<H>  5.1   |  29  |  2.26<H>    Ca    8.6      17 Mar 2021 06:34  Phos  4.1     03-17  Mg     2.8     03-17    TPro  6.9  /  Alb  3.3  /  TBili  0.7  /  DBili  x   /  AST  65<H>  /  ALT  84<H>  /  AlkPhos  91  03-17    03-13 @ 15:57  pH: 7.39  pCO2: 44  pO2: 91  SaO2: 97  < from: NM Pharm Stress Test, Dual Isotope (03.17.21 @ 12:40) >  EXAM:  NM NUCLEAR STRESS MULTI PHARM                            *** ADDENDUM 03/17/2021  ***    [facility]    RADIOPHARMACEUTICAL: 40 mCi 99mTc-sestamibi  total    CLINICAL INFORMATION: 75  year old male with CAD, s/p CABG, referred to evaluate for cardiac ischemia and cardiac function.    STRESS TEST RESULTS AVAILABLE IN SEPARATE REPORT    TECHNIQUE: At rest, 10 mCi of technetium-99m sestamibi was injected intravenously and gated SPECT myocardial perfusion imaging was performed 45 minutes later. At peak stress, 30 mCi of technetium-99m sestamibi was injected intravenously and gated SPECT myocardial perfusion imaging was performed. Data was reconstructed in the cardiac standard short axis, horizontal long axis and vertical long axis projections.    FINDINGS:  The technical quality of the resting and post stress perfusion images was fair.  Review of resting and post stress myocardial scintigram revealed a fixed basal inferior and mid inferior wall perfusion defect.  Gated wall motion study revealed globally diminished left ventricular contractility.  No significant wall motion abnormalities were noted.  No paradoxical septal motion was seen.  The right ventricle appeared unremarkable.  The left ventricle appeared dilated with no transient ischemic dilatation.    Calculated left ventricular ejection fraction post stress was 21% (based on a left ventricular end-diastolic volume of 195 ml and an systolic volume of 155 ml. Stroke volume was 40 ml.    IMPRESSION: Abnormal myocardial perfusion scan.    1. There was scintigraphic evidence for a myocardial infarct in the RCA territory with no significant ischemia.    2. There is severely abnormal left ventricular contractility, globally diminished calculated ejection fraction and no wall motion abnormlaities. Overall post stress ejection fraction was 21%    3. Please see the cardiac test report for EKG findings and symptoms during the procedure    *** END OF ADDENDUM 03/17/2021 ***    PROCEDURE DATE:  03/17/2021      INTERPRETATION:  Name of test: [facility] nuclear stress test    Indication: Dyspnea    Medical History: CAD. CABG. Diabetes mellitus. Hypertension. Hyperlipidemia. Permanent ventricular pacemaker. AICD.    Medication: Aspirin. Insulin. Toprol-XL. Zocor, Xarelto.    Interpretation: Patient exercise according to Lexiscan protocol. The resting heart rate was 74 bpm. As to a maximum heart rate of 82 bpm. This value represent 57% of the maximal age predicted heart rate. Patient tolerated the procedure well. No complications occurred. No chest pain occurred. No further EKG changes seen. Resting electrocardiogram showed the patient had permanent pacemaker. Normal blood pressure response. The exercise test was stopped due to completion of protocol. Negative EKG changes for stress-induced myocardial ischemia. Waiting for nuclear imaging report.    Conclusion/summary: Negative EKG changes for stress-induced myocardial ischemia. Waiting for nuclear imaging report      ***Please see the addendum at the top of this report. It may contain additional important information or changes.****    JOSE ROBERTO MUNOZ MD; Attending Cardiologist  This document has been electronically signed. Mar 17 2021 11:06AM  Addend:PORSCHE WILKS MD; Attending Cardiologist  This addendum was electronically signed on: Mar 17 2021  2:33PM.    ASSESSMENT AND PLAN:  ·	Acute on chronic hypoxic Respiratory failure.  ·	Acute on chronic systolic + diastolic CHF.  ·	Aortic Regurgitation+ Stenosis.  ·	Acute COPD exacerbation.  ·	Leukocytosis.  ·	Renal Insuffiencey.  ·	CAD with CABG+ stents.  ·	S/P AICD.  ·	Obesity.  ·	EMBER.  ·	HTN.  ·	HLD.  ·	DM.  ·	Gout.    Nuclear stress test noted.  Continue steroid taper, start Symbicort.  Nebulizer as needed.  On empiric antibiotics.  Rivaroxaban reason not clear.
76yo, BM with h/o HTN, DM, COPD (on home o2 2L), CHF, PPM/AICD presents today biba from home with sudden onset of sob, states that he was watching television when his symptoms occurred, shortly before he did ambulate to the bathroom +chest tightness (-) fevers or chills (-) cough (-) flu like symptoms, (-) leg edema. Notes CP.   In the field pt's o2 saturation was 69%, he was given dexamethasone and albuterol and placed on cpap with improvement   (13 Mar 2021 18:29)      Chief Complaint:  Patient is a 75y old  Male who presents with a chief complaint of Shortness of breath (16 Mar 2021 18:58)      Review of Systems:    General:  No wt loss, fevers, chills, night sweats  Eyes:  Good vision, no reported pain  ENT:  No sore throat, pain, runny nose, dysphagia  CV:  No pain, palpitations, hypo/hypertension  Resp:  No dyspnea, cough, tachypnea, wheezing  GI:  No pain, nausea, vomiting, diarrhea, constipation             Social History/Family History  SOCHX:   tobacco,  -  alcohol    FMHX: FA/MO  - contributory       Discussed with:  PMD, Family    Physical Exam:    Vital Signs:  Vital Signs Last 24 Hrs  T(C): 36.7 (16 Mar 2021 16:20), Max: 36.7 (16 Mar 2021 16:20)  T(F): 98 (16 Mar 2021 16:20), Max: 98 (16 Mar 2021 16:20)  HR: 69 (16 Mar 2021 18:36) (66 - 85)  BP: 132/75 (16 Mar 2021 16:20) (104/61 - 132/75)  BP(mean): --  RR: 18 (16 Mar 2021 16:20) (17 - 18)  SpO2: 99% (16 Mar 2021 18:36) (94% - 100%)  Daily     Daily Weight in k.2 (16 Mar 2021 04:50)  I&O's Summary    15 Mar 2021 07:01  -  16 Mar 2021 07:00  --------------------------------------------------------  IN: 0 mL / OUT: 400 mL / NET: -400 mL    16 Mar 2021 07:01  -  16 Mar 2021 21:11  --------------------------------------------------------  IN: 0 mL / OUT: 1100 mL / NET: -1100 mL          Chest:  Full & symmetric excursion, no increased effort, breath sounds clear  Cardiovascular:  Regular rhythm, S1, S2, no murmur/rub/S3/S4, no carotid/femoral/abdominal bruit, radial/pedal pulses 2+,  Abdomen:  Soft, non-tender, non-distended, normoactive bowel sounds, no HSM      Laboratory:                          12.6   16.86 )-----------( 157      ( 16 Mar 2021 08:02 )             40.1     03-16    138  |  101  |  69<H>  ----------------------------<  285<H>  5.3   |  30  |  2.28<H>    Ca    8.6      16 Mar 2021 08:02  Phos  3.2     03-16  Mg     2.6     03-16            CAPILLARY BLOOD GLUCOSE      POCT Blood Glucose.: 446 mg/dL (16 Mar 2021 16:17)  POCT Blood Glucose.: 337 mg/dL (16 Mar 2021 11:16)  POCT Blood Glucose.: 265 mg/dL (16 Mar 2021 07:47)        Assessment:  I am asked to consult this patient with an admitting diagnosis of acute shortness of breath  Pulmonary consultation and workup is ongoing and noted  the patient's diagnostic echocardiogram revealed a reduced ejection fraction as compared to a previous echocardiogram in 2019  However, the patient does have an ACID implanted and the EF is likely chronically decreased  The patient's mildly elevated troponin level is not likely to be indicative of a myocardial infarction  however given the patient's new finding On diagnostic echocardiogram with stated wall motion abnormalities ischemic workup is suggested  A nuclear stress test is noted and no significant ischemia is seen     Medical management as written will continue  Continue the current pulmonary and other medical management as written         
[facility] NEPHROLOGY SERVICES, [facility]  NEPHROLOGY AND HYPERTENSION  [address]  [address]  [place] [address]  [phone]    MD DANIEL GARRETT MD ANDREY GONCHARUK, MD MADHU KORRAPATI, MD YELENA ROSENBERG, MD BINNY KOSHY, MD CHRISTOPHER CAPUTO, MD EDWARD BOVER, MD          Patient events noted feels well no distress  nightly bipap    MEDICATIONS  (STANDING):  ALBUTerol    90 MICROgram(s) HFA Inhaler 1 Puff(s) Inhalation every 4 hours  allopurinol 200 milliGRAM(s) Oral daily  aspirin enteric coated 81 milliGRAM(s) Oral daily  cefTRIAXone   IVPB      cefTRIAXone   IVPB 1000 milliGRAM(s) IV Intermittent every 24 hours  dextrose 40% Gel 15 Gram(s) Oral once  dextrose 5%. 1000 milliLiter(s) (50 mL/Hr) IV Continuous <Continuous>  dextrose 50% Injectable 25 Gram(s) IV Push once  dextrose 50% Injectable 12.5 Gram(s) IV Push once  dextrose 50% Injectable 25 Gram(s) IV Push once  folic acid 1 milliGRAM(s) Oral daily  glucagon  Injectable 1 milliGRAM(s) IntraMuscular once  insulin glargine Injectable (LANTUS) 10 Unit(s) SubCutaneous at bedtime  insulin lispro (ADMELOG) corrective regimen sliding scale   SubCutaneous at bedtime  insulin lispro (ADMELOG) corrective regimen sliding scale   SubCutaneous three times a day before meals  methylPREDNISolone sodium succinate Injectable 40 milliGRAM(s) IV Push every 12 hours  metoprolol succinate ER 50 milliGRAM(s) Oral daily  pantoprazole    Tablet 40 milliGRAM(s) Oral before breakfast  rivaroxaban 15 milliGRAM(s) Oral daily  simvastatin 40 milliGRAM(s) Oral at bedtime  tamsulosin 0.4 milliGRAM(s) Oral at bedtime  tiotropium 18 MICROgram(s) Capsule 1 Capsule(s) Inhalation daily    MEDICATIONS  (PRN):      03-15-21 @ 07:01  -  03-16-21 @ 07:00  --------------------------------------------------------  IN: 0 mL / OUT: 400 mL / NET: -400 mL    03-16-21 @ 07:01  -  03-16-21 @ 18:58  --------------------------------------------------------  IN: 0 mL / OUT: 600 mL / NET: -600 mL      PHYSICAL EXAM:      T(C): 36.7 (03-16-21 @ 16:20), Max: 36.7 (03-16-21 @ 16:20)  HR: 69 (03-16-21 @ 18:36) (66 - 85)  BP: 132/75 (03-16-21 @ 16:20) (104/61 - 132/75)  RR: 18 (03-16-21 @ 16:20) (17 - 18)  SpO2: 99% (03-16-21 @ 18:36) (94% - 100%)  Wt(kg): --  Lungs clear  Heart S1S2  Abd soft NT ND  Extremities:   tr edema                                    12.6   16.86 )-----------( 157      ( 16 Mar 2021 08:02 )             40.1     03-16    138  |  101  |  69<H>  ----------------------------<  285<H>  5.3   |  30  |  2.28<H>    Ca    8.6      16 Mar 2021 08:02  Phos  3.2     03-16  Mg     2.6     03-16          Creatinine Trend: 2.28<--, 2.43<--, 2.34<--, 2.22<--        Assessment   DENISHA CKD 3, pre renal azotemia; warranted diuresis   PNA    Plan  Continue current plan  Will follow course and as outpatient.     Jon Rowe MD
Patient is a 75y old  Male who presents with a chief complaint of Shortness of breath (17 Mar 2021 21:31)      SUBJECTIVE/OBJECTIVE:    Without complaints. Patient resting comfortably.     MEDICATIONS  (STANDING):  ALBUTerol    90 MICROgram(s) HFA Inhaler 1 Puff(s) Inhalation every 4 hours  allopurinol 200 milliGRAM(s) Oral daily  aspirin enteric coated 81 milliGRAM(s) Oral daily  cefTRIAXone   IVPB      cefTRIAXone   IVPB 1000 milliGRAM(s) IV Intermittent every 24 hours  dextrose 40% Gel 15 Gram(s) Oral once  dextrose 5%. 1000 milliLiter(s) (50 mL/Hr) IV Continuous <Continuous>  dextrose 50% Injectable 25 Gram(s) IV Push once  dextrose 50% Injectable 12.5 Gram(s) IV Push once  dextrose 50% Injectable 25 Gram(s) IV Push once  folic acid 1 milliGRAM(s) Oral daily  glucagon  Injectable 1 milliGRAM(s) IntraMuscular once  insulin glargine Injectable (LANTUS) 10 Unit(s) SubCutaneous at bedtime  insulin lispro (ADMELOG) corrective regimen sliding scale   SubCutaneous at bedtime  insulin lispro (ADMELOG) corrective regimen sliding scale   SubCutaneous three times a day before meals  insulin lispro Injectable (ADMELOG) 8 Unit(s) SubCutaneous three times a day before meals  methylPREDNISolone sodium succinate Injectable 20 milliGRAM(s) IV Push every 12 hours  metoprolol succinate ER 50 milliGRAM(s) Oral daily  pantoprazole    Tablet 40 milliGRAM(s) Oral before breakfast  rivaroxaban 15 milliGRAM(s) Oral daily  simvastatin 40 milliGRAM(s) Oral at bedtime  tamsulosin 0.4 milliGRAM(s) Oral at bedtime  tiotropium 18 MICROgram(s) Capsule 1 Capsule(s) Inhalation daily    MEDICATIONS  (PRN):      Allergies    No Known Allergies    Intolerances          Vital Signs Last 24 Hrs  T(C): 36.6 (18 Mar 2021 04:57), Max: 36.6 (18 Mar 2021 04:57)  T(F): 97.9 (18 Mar 2021 04:57), Max: 97.9 (18 Mar 2021 04:57)  HR: 72 (18 Mar 2021 09:55) (69 - 82)  BP: 154/81 (18 Mar 2021 04:57) (134/73 - 156/82)  BP(mean): --  RR: 18 (18 Mar 2021 04:57) (18 - 18)  SpO2: 95% (18 Mar 2021 09:55) (95% - 98%)    PHYSICAL EXAM:  GENERAL: NAD, well-groomed, well-developed  HEAD:  Atraumatic, Normocephalic  EYES: EOMI, PERRLA, conjunctiva and sclera clear  ENMT: No tonsillar erythema, exudates, or enlargement; Moist mucous membranes, No lesions  NECK: Supple, No JVD, Normal thyroid  NERVOUS SYSTEM:  Alert & Oriented X3; Motor Strength 5/5 B/L upper and lower extremities; DTRs 2+ intact and symmetric  CHEST/LUNG: Harsh BS bilaterally; No rales, rhonchi, wheezing, or rubs  HEART: Regular rate and rhythm; No murmurs, rubs, or gallops  ABDOMEN: Soft, Nontender, Nondistended; Bowel sounds present  EXTREMITIES:  2+ Peripheral Pulses, No clubbing, cyanosis, or edema  LYMPH: No lymphadenopathy noted  SKIN: No rashes or lesions    LABS:                        13.6   14.79 )-----------( 153      ( 18 Mar 2021 09:15 )             43.7     03-18    142  |  107  |  70<H>  ----------------------------<  312<H>  5.0   |  29  |  2.12<H>    Ca    8.5      18 Mar 2021 09:15  Phos  3.8     03-18  Mg     2.7     03-18    TPro  6.9  /  Alb  3.3  /  TBili  0.7  /  DBili  x   /  AST  234<H>  /  ALT  282<H>  /  AlkPhos  98  03-18        CAPILLARY BLOOD GLUCOSE      POCT Blood Glucose.: 374 mg/dL (18 Mar 2021 11:11)  POCT Blood Glucose.: 287 mg/dL (18 Mar 2021 07:28)  POCT Blood Glucose.: 356 mg/dL (17 Mar 2021 20:36)  POCT Blood Glucose.: 368 mg/dL (17 Mar 2021 16:19)  POCT Blood Glucose.: 314 mg/dL (17 Mar 2021 12:05)          RADIOLOGY & ADDITIONAL TESTS:    `< from: NM Pharm Stress Test, Dual Isotope (03.17.21 @ 12:40) >  FINDINGS:  The technical quality of the resting and post stress perfusion images was fair.  Review of resting and post stress myocardial scintigram revealed a fixed basal inferior and mid inferior wall perfusion defect.  Gated wall motion study revealed globally diminished left ventricular contractility.  No significant wall motion abnormalities were noted.  No paradoxical septal motion was seen.  The right ventricle appeared unremarkable.  The left ventricle appeared dilated with no transient ischemic dilatation.    Calculated left ventricular ejection fraction post stress was 21% (based on a left ventricular end-diastolic volume of 195 ml and an systolic volume of 155 ml. Stroke volume was 40 ml.        IMPRESSION: Abnormal myocardial perfusion scan.    1. There was scintigraphic evidence for a myocardial infarct in the RCA territory with no significant ischemia.    2. There is severely abnormal left ventricular contractility, globally diminished calculated ejection fraction and no wall motion abnormlaities. Overall post stress ejection fraction was 21%    3. Please see the cardiac test report for EKG findings and symptoms during the procedure    *** END OF ADDENDUM 03/17/2021 ***      PROCEDURE DATE:  03/17/2021          INTERPRETATION:  Name of test: Lexiscan nuclear stress test    Indication: Dyspnea    Medical History: CAD. CABG. Diabetes mellitus. Hypertension. Hyperlipidemia. Permanent ventricular pacemaker. AICD.    Medication: Aspirin. Insulin. Toprol-XL. Zocor, Xarelto.    Interpretation: Patient exercise according to Lexiscan protocol. The resting heart rate was 74 bpm. As to a maximum heart rate of 82 bpm. This value represent 57% of the maximal age predicted heart rate. Patient tolerated the procedure well. No complications occurred. No chest pain occurred. No further EKG changes seen. Resting electrocardiogram showed the patient had permanent pacemaker. Normal blood pressure response. The exercise test was stopped due to completion of protocol. Negative EKG changes for stress-induced myocardial ischemia. Waiting for nuclear imaging report.    Conclusion/summary: Negative EKG changes for stress-induced myocardial ischemia. Waiting for nuclear imaging report      ***Please see the addendum at the top of this report. It may contain additional important information or changes.****        JOSE ROBERTO MUNOZ MD; Attending Cardiologist  This document has been electronically signed. Mar 17 2021 11:06AM  Addend:PORSCHE WILKS MD; Attending Cardiologist  This addendum was electronically signed on: Mar 17 2021  2:33PM.    < end of copied text >      Consultant(s) Notes Reviewed:  [xxxx ] YES  [ ] NO  
Patient is a 75y old  Male who presents with a chief complaint of Shortness of breath (15 Mar 2021 14:49)      SUBJECTIVE/OBJECTIVE:    Without complaints. Patient resting comfortably.     MEDICATIONS  (STANDING):  ALBUTerol    90 MICROgram(s) HFA Inhaler 1 Puff(s) Inhalation every 4 hours  allopurinol 200 milliGRAM(s) Oral daily  aspirin enteric coated 81 milliGRAM(s) Oral daily  cefTRIAXone   IVPB      cefTRIAXone   IVPB 1000 milliGRAM(s) IV Intermittent every 24 hours  dextrose 40% Gel 15 Gram(s) Oral once  dextrose 5%. 1000 milliLiter(s) (50 mL/Hr) IV Continuous <Continuous>  dextrose 50% Injectable 25 Gram(s) IV Push once  dextrose 50% Injectable 12.5 Gram(s) IV Push once  dextrose 50% Injectable 25 Gram(s) IV Push once  folic acid 1 milliGRAM(s) Oral daily  glucagon  Injectable 1 milliGRAM(s) IntraMuscular once  insulin glargine Injectable (LANTUS) 10 Unit(s) SubCutaneous at bedtime  insulin lispro (ADMELOG) corrective regimen sliding scale   SubCutaneous at bedtime  insulin lispro (ADMELOG) corrective regimen sliding scale   SubCutaneous three times a day before meals  methylPREDNISolone sodium succinate Injectable 40 milliGRAM(s) IV Push every 12 hours  metoprolol succinate ER 50 milliGRAM(s) Oral daily  pantoprazole    Tablet 40 milliGRAM(s) Oral before breakfast  rivaroxaban 15 milliGRAM(s) Oral daily  simvastatin 40 milliGRAM(s) Oral at bedtime  tamsulosin 0.4 milliGRAM(s) Oral at bedtime  tiotropium 18 MICROgram(s) Capsule 1 Capsule(s) Inhalation daily    MEDICATIONS  (PRN):      Allergies    No Known Allergies    Intolerances          Vital Signs Last 24 Hrs  T(C): 36.6 (16 Mar 2021 04:50), Max: 37.1 (15 Mar 2021 16:45)  T(F): 97.9 (16 Mar 2021 04:50), Max: 98.8 (15 Mar 2021 16:45)  HR: 70 (16 Mar 2021 08:05) (66 - 85)  BP: 126/64 (16 Mar 2021 04:50) (104/61 - 129/76)  BP(mean): --  RR: 18 (16 Mar 2021 04:50) (18 - 18)  SpO2: 100% (16 Mar 2021 08:05) (96% - 100%)    PHYSICAL EXAM:  GENERAL: NAD, well-groomed, well-developed  HEAD:  Atraumatic, Normocephalic  EYES: EOMI, PERRLA, conjunctiva and sclera clear  ENMT: No tonsillar erythema, exudates, or enlargement; Moist mucous membranes, No lesions  NECK: Supple, No JVD, Normal thyroid  NERVOUS SYSTEM:  Alert & Oriented X3; Motor Strength 5/5 B/L upper and lower extremities; DTRs 2+ intact and symmetric  CHEST/LUNG: Clear bilaterally; No rales, rhonchi, wheezing, or rubs  HEART: Regular rate and rhythm; No murmurs, rubs, or gallops  ABDOMEN: Soft, Nontender, Nondistended; Bowel sounds present  EXTREMITIES:  2+ Peripheral Pulses, No clubbing, cyanosis, or edema  LYMPH: No lymphadenopathy noted  SKIN: No rashes or lesions    LABS:                        12.6   16.86 )-----------( 157      ( 16 Mar 2021 08:02 )             40.1     03-16    138  |  101  |  69<H>  ----------------------------<  285<H>  5.3   |  30  |  2.28<H>    Ca    8.6      16 Mar 2021 08:02  Phos  3.2     03-16  Mg     2.6     03-16          CAPILLARY BLOOD GLUCOSE      POCT Blood Glucose.: 265 mg/dL (16 Mar 2021 07:47)  POCT Blood Glucose.: 411 mg/dL (15 Mar 2021 21:07)  POCT Blood Glucose.: 472 mg/dL (15 Mar 2021 18:16)  POCT Blood Glucose.: 531 mg/dL (15 Mar 2021 16:34)  POCT Blood Glucose.: 462 mg/dL (15 Mar 2021 16:30)  POCT Blood Glucose.: 462 mg/dL (15 Mar 2021 16:27)    CARDIAC MARKERS ( 14 Mar 2021 12:45 )  .234 ng/mL / x     / 206 U/L / x     / x            RADIOLOGY & ADDITIONAL TESTS:      < from: TTE Echo Complete w/o Contrast w/ Doppler (03.14.21 @ 14:40) >  Summary:   1. Left ventricular ejection fraction, by visual estimation, is 35 to 40%.   2. Technically difficult study.   3. Moderately decreased global left ventricular systolic function.   4. Mid and apical anterior wall and mid anteroseptal segment are abnormal as described above.   5. Elevated left ventricular end-diastolic pressure.   6. Normal left ventricular internal cavity size.   7. Spectral Doppler shows pseudonormal pattern of left ventricular myocardial filling (Grade II diastolic dysfunction).   8. Normal right ventricular size and function.   9. Mildly enlarged left atrium.  10. Mildly enlarged right atrium.  11. There is no evidence of pericardial effusion.  12. Mild thickening and calcification of the anterior and posterior mitral valve leaflets.  13. Trace mitral valve regurgitation.  14. Moderate aortic regurgitation.  15. Moderate aortic valve stenosis.  16. Mild pulmonic valve regurgitation.  17. Estimated pulmonary artery systolic pressure is 42.7 mmHg assuming a right atrial pressure of 8 mmHg, which is consistent with mild pulmonary hypertension.  18. Consider limited Definity echocontrast follow up study to better evaluate left ventricular function.  19. Peak transaortic gradient equals 20.8 mmHg, mean transaortic gradient equals 10.2mmHg, the calculated aortic valve area equals 1.17 cm² by the continuity equation consistent with moderate aortic stenosis.    < end of copied text >    Consultant(s) Notes Reviewed:  [xxxx ] YES  [ ] NO  
Patient is a 75y old  Male who presents with a chief complaint of Shortness of breath (15 Mar 2021 08:43)      SUBJECTIVE/OBJECTIVE:  Without complaints. Patient resting comfortably.       MEDICATIONS  (STANDING):  ALBUTerol    90 MICROgram(s) HFA Inhaler 1 Puff(s) Inhalation every 4 hours  allopurinol 200 milliGRAM(s) Oral daily  aspirin enteric coated 81 milliGRAM(s) Oral daily  cefTRIAXone   IVPB      cefTRIAXone   IVPB 1000 milliGRAM(s) IV Intermittent every 24 hours  dextrose 40% Gel 15 Gram(s) Oral once  dextrose 5%. 1000 milliLiter(s) (50 mL/Hr) IV Continuous <Continuous>  dextrose 50% Injectable 25 Gram(s) IV Push once  dextrose 50% Injectable 12.5 Gram(s) IV Push once  dextrose 50% Injectable 25 Gram(s) IV Push once  folic acid 1 milliGRAM(s) Oral daily  glucagon  Injectable 1 milliGRAM(s) IntraMuscular once  insulin lispro (ADMELOG) corrective regimen sliding scale   SubCutaneous three times a day before meals  insulin lispro (ADMELOG) corrective regimen sliding scale   SubCutaneous at bedtime  methylPREDNISolone sodium succinate Injectable 40 milliGRAM(s) IV Push every 8 hours  metoprolol succinate ER 50 milliGRAM(s) Oral daily  pantoprazole    Tablet 40 milliGRAM(s) Oral before breakfast  rivaroxaban 15 milliGRAM(s) Oral daily  simvastatin 40 milliGRAM(s) Oral at bedtime  tamsulosin 0.4 milliGRAM(s) Oral at bedtime  tiotropium 18 MICROgram(s) Capsule 1 Capsule(s) Inhalation daily    MEDICATIONS  (PRN):      Allergies    No Known Allergies    Intolerances          Vital Signs Last 24 Hrs  T(C): 36.8 (15 Mar 2021 10:42), Max: 36.8 (15 Mar 2021 10:42)  T(F): 98.2 (15 Mar 2021 10:42), Max: 98.2 (15 Mar 2021 10:42)  HR: 69 (15 Mar 2021 10:42) (69 - 77)  BP: 112/73 (15 Mar 2021 10:42) (112/73 - 135/71)  BP(mean): --  RR: 18 (15 Mar 2021 10:42) (15 - 18)  SpO2: 95% (15 Mar 2021 10:42) (95% - 100%)    PHYSICAL EXAM:  GENERAL: NAD, well-groomed, well-developed  HEAD:  Atraumatic, Normocephalic  EYES: EOMI, PERRLA, conjunctiva and sclera clear  ENMT: No tonsillar erythema, exudates, or enlargement; Moist mucous membranes, No lesions  NECK: Supple, No JVD, Normal thyroid  NERVOUS SYSTEM:  Alert & Oriented X3; Motor Strength 5/5 B/L upper and lower extremities; DTRs 2+ intact and symmetric  CHEST/LUNG: Clear bilaterally; No rales, rhonchi, wheezing, or rubs  HEART: Regular rate and rhythm; No murmurs, rubs, or gallops  ABDOMEN: Soft, Nontender, Nondistended; Bowel sounds present  EXTREMITIES:  2+ Peripheral Pulses, No clubbing, cyanosis, or edema  LYMPH: No lymphadenopathy noted  SKIN: No rashes or lesions    LABS:                        12.8   17.38 )-----------( 158      ( 15 Mar 2021 08:09 )             41.4     03-15    138  |  101  |  63<H>  ----------------------------<  291<H>  5.2   |  29  |  2.43<H>    Ca    8.9      15 Mar 2021 08:09    TPro  7.4  /  Alb  3.4  /  TBili  0.7  /  DBili  x   /  AST  63<H>  /  ALT  56  /  AlkPhos  90  03-14    PT/INR - ( 13 Mar 2021 15:46 )   PT: 13.6 sec;   INR: 1.18 ratio         PTT - ( 13 Mar 2021 15:46 )  PTT:24.1 sec  Urinalysis Basic - ( 13 Mar 2021 23:45 )    Color: Yellow / Appearance: Clear / S.015 / pH: x  Gluc: x / Ketone: Negative  / Bili: Negative / Urobili: Negative mg/dL   Blood: x / Protein: 100 mg/dL / Nitrite: Negative   Leuk Esterase: Negative / RBC: 0-2 /HPF / WBC 0-2   Sq Epi: x / Non Sq Epi: Occasional / Bacteria: Occasional      CAPILLARY BLOOD GLUCOSE      POCT Blood Glucose.: 411 mg/dL (15 Mar 2021 10:51)  POCT Blood Glucose.: 460 mg/dL (15 Mar 2021 10:50)  POCT Blood Glucose.: 260 mg/dL (15 Mar 2021 07:47)  POCT Blood Glucose.: 270 mg/dL (14 Mar 2021 21:00)  POCT Blood Glucose.: 238 mg/dL (14 Mar 2021 17:07)  POCT Blood Glucose.: 435 mg/dL (14 Mar 2021 13:12)  POCT Blood Glucose.: 412 mg/dL (14 Mar 2021 13:09)    CARDIAC MARKERS ( 14 Mar 2021 12:45 )  .234 ng/mL / x     / 206 U/L / x     / x      CARDIAC MARKERS ( 14 Mar 2021 04:17 )  .307 ng/mL / x     / 194 U/L / x     / x      CARDIAC MARKERS ( 13 Mar 2021 20:48 )  .298 ng/mL / x     / 225 U/L / x     / x            RADIOLOGY & ADDITIONAL TESTS:        Consultant(s) Notes Reviewed:  [ xx] YES  [ ] NO  
Patient is a 75y old  Male who presents with a chief complaint of Shortness of breath (13 Mar 2021 21:28)      SUBJECTIVE/OBJECTIVE:    Without complaints. Patient resting comfortably.     MEDICATIONS  (STANDING):  ALBUTerol    90 MICROgram(s) HFA Inhaler 1 Puff(s) Inhalation every 4 hours  allopurinol 200 milliGRAM(s) Oral daily  aspirin enteric coated 81 milliGRAM(s) Oral daily  cefTRIAXone   IVPB      cefTRIAXone   IVPB 1000 milliGRAM(s) IV Intermittent every 24 hours  dextrose 40% Gel 15 Gram(s) Oral once  dextrose 5%. 1000 milliLiter(s) (50 mL/Hr) IV Continuous <Continuous>  dextrose 50% Injectable 25 Gram(s) IV Push once  dextrose 50% Injectable 12.5 Gram(s) IV Push once  dextrose 50% Injectable 25 Gram(s) IV Push once  folic acid 1 milliGRAM(s) Oral daily  furosemide   Injectable 40 milliGRAM(s) IV Push once  glucagon  Injectable 1 milliGRAM(s) IntraMuscular once  insulin lispro (ADMELOG) corrective regimen sliding scale   SubCutaneous three times a day before meals  insulin lispro (ADMELOG) corrective regimen sliding scale   SubCutaneous at bedtime  methylPREDNISolone sodium succinate Injectable 40 milliGRAM(s) IV Push every 8 hours  metoprolol succinate ER 50 milliGRAM(s) Oral daily  pantoprazole    Tablet 40 milliGRAM(s) Oral before breakfast  rivaroxaban 20 milliGRAM(s) Oral daily  simvastatin 40 milliGRAM(s) Oral at bedtime  tamsulosin 0.4 milliGRAM(s) Oral at bedtime  tiotropium 18 MICROgram(s) Capsule 1 Capsule(s) Inhalation daily    MEDICATIONS  (PRN):      Allergies    No Known Allergies    Intolerances          Vital Signs Last 24 Hrs  T(C): 36.2 (14 Mar 2021 10:50), Max: 37.1 (13 Mar 2021 14:47)  T(F): 97.2 (14 Mar 2021 10:50), Max: 98.7 (13 Mar 2021 14:47)  HR: 81 (14 Mar 2021 12:16) (69 - 91)  BP: 126/76 (14 Mar 2021 10:50) (124/90 - 159/72)  BP(mean): --  RR: 18 (14 Mar 2021 10:50) (12 - 20)  SpO2: 97% (14 Mar 2021 12:16) (93% - 99%)    PHYSICAL EXAM:  GENERAL: NAD, well-groomed, well-developed  HEAD:  Atraumatic, Normocephalic  EYES:  PERRLA, conjunctiva and sclera clear  ENMT: Intact  NECK: Supple, No JVD, Normal thyroid  NERVOUS SYSTEM:  Alert & Oriented X3; Motor Strength 5/5  CHEST/LUNG: Clear bilaterally; No rales, rhonchi, wheezing, or rubs  HEART: Regular rate and rhythm; No murmurs, rubs, or gallops  ABDOMEN: Soft, Nontender, Nondistended; Bowel sounds present  EXTREMITIES:   No clubbing, cyanosis.   Trace  edema  LYMPH: No lymphadenopathy noted  SKIN: No rashes or lesions    LABS:                        13.4   13.96 )-----------( 158      ( 14 Mar 2021 04:20 )             43.3     03-14    138  |  102  |  44<H>  ----------------------------<  347<H>  5.7<H>   |  30  |  2.34<H>    Ca    8.7      14 Mar 2021 04:17    TPro  7.4  /  Alb  3.4  /  TBili  0.7  /  DBili  x   /  AST  63<H>  /  ALT  56  /  AlkPhos  90  03-14    PT/INR - ( 13 Mar 2021 15:46 )   PT: 13.6 sec;   INR: 1.18 ratio         PTT - ( 13 Mar 2021 15:46 )  PTT:24.1 sec  Urinalysis Basic - ( 13 Mar 2021 23:45 )    Color: Yellow / Appearance: Clear / S.015 / pH: x  Gluc: x / Ketone: Negative  / Bili: Negative / Urobili: Negative mg/dL   Blood: x / Protein: 100 mg/dL / Nitrite: Negative   Leuk Esterase: Negative / RBC: 0-2 /HPF / WBC 0-2   Sq Epi: x / Non Sq Epi: Occasional / Bacteria: Occasional      CAPILLARY BLOOD GLUCOSE      POCT Blood Glucose.: 413 mg/dL (14 Mar 2021 11:37)  POCT Blood Glucose.: 414 mg/dL (14 Mar 2021 11:33)  POCT Blood Glucose.: 332 mg/dL (14 Mar 2021 08:35)  POCT Blood Glucose.: 356 mg/dL (14 Mar 2021 07:26)  POCT Blood Glucose.: 325 mg/dL (13 Mar 2021 19:51)    CARDIAC MARKERS ( 14 Mar 2021 04:17 )  .307 ng/mL / x     / 194 U/L / x     / x      CARDIAC MARKERS ( 13 Mar 2021 20:48 )  .298 ng/mL / x     / 225 U/L / x     / x            RADIOLOGY & ADDITIONAL TESTS:      < from: Xray Chest 1 View- PORTABLE-Urgent (Xray Chest 1 View- PORTABLE-Urgent .) (21 @ 17:04) >    Findings/  Impression: Status post left defibrillator and CABG. Severe cardiomegaly. No lung consolidations. No pleural effusions or pneumothorax.        < end of copied text >    Consultant(s) Notes Reviewed:  [ ] YES  [ ] NO  
Patient is a 75y old  Male who presents with a chief complaint of Shortness of breath (16 Mar 2021 21:10)      SUBJECTIVE/OBJECTIVE:    Without complaints. Patient resting comfortably.     MEDICATIONS  (STANDING):  ALBUTerol    90 MICROgram(s) HFA Inhaler 1 Puff(s) Inhalation every 4 hours  allopurinol 200 milliGRAM(s) Oral daily  aspirin enteric coated 81 milliGRAM(s) Oral daily  cefTRIAXone   IVPB      cefTRIAXone   IVPB 1000 milliGRAM(s) IV Intermittent every 24 hours  dextrose 40% Gel 15 Gram(s) Oral once  dextrose 5%. 1000 milliLiter(s) (50 mL/Hr) IV Continuous <Continuous>  dextrose 50% Injectable 25 Gram(s) IV Push once  dextrose 50% Injectable 12.5 Gram(s) IV Push once  dextrose 50% Injectable 25 Gram(s) IV Push once  folic acid 1 milliGRAM(s) Oral daily  glucagon  Injectable 1 milliGRAM(s) IntraMuscular once  insulin glargine Injectable (LANTUS) 10 Unit(s) SubCutaneous at bedtime  insulin lispro (ADMELOG) corrective regimen sliding scale   SubCutaneous at bedtime  insulin lispro (ADMELOG) corrective regimen sliding scale   SubCutaneous three times a day before meals  methylPREDNISolone sodium succinate Injectable 40 milliGRAM(s) IV Push every 12 hours  metoprolol succinate ER 50 milliGRAM(s) Oral daily  pantoprazole    Tablet 40 milliGRAM(s) Oral before breakfast  regadenoson Injectable 0.4 milliGRAM(s) IV Push once  rivaroxaban 15 milliGRAM(s) Oral daily  simvastatin 40 milliGRAM(s) Oral at bedtime  tamsulosin 0.4 milliGRAM(s) Oral at bedtime  tiotropium 18 MICROgram(s) Capsule 1 Capsule(s) Inhalation daily    MEDICATIONS  (PRN):      Allergies    No Known Allergies    Intolerances          Vital Signs Last 24 Hrs  T(C): 36.4 (17 Mar 2021 05:13), Max: 36.8 (17 Mar 2021 00:16)  T(F): 97.6 (17 Mar 2021 05:13), Max: 98.3 (17 Mar 2021 00:16)  HR: 70 (17 Mar 2021 08:26) (65 - 78)  BP: 146/79 (17 Mar 2021 05:13) (117/59 - 175/90)  BP(mean): --  RR: 18 (17 Mar 2021 05:13) (17 - 18)  SpO2: 95% (17 Mar 2021 08:26) (92% - 100%)    PHYSICAL EXAM:  GENERAL: NAD, well-groomed, well-developed  HEAD:  Atraumatic, Normocephalic  EYES: EOMI, PERRLA, conjunctiva and sclera clear  ENMT: No tonsillar erythema, exudates, or enlargement; Moist mucous membranes, No lesions  NECK: Supple, No JVD, Normal thyroid  NERVOUS SYSTEM:  Alert & Oriented X3; Motor Strength 5/5 B/L upper and lower extremities; DTRs 2+ intact and symmetric  CHEST/LUNG: Clear bilaterally; No rales, rhonchi, wheezing, or rubs  HEART: Regular rate and rhythm; No murmurs, rubs, or gallops  ABDOMEN: Soft, Nontender, Nondistended; Bowel sounds present  EXTREMITIES:  2+ Peripheral Pulses, No clubbing, cyanosis, or edema  LYMPH: No lymphadenopathy noted  SKIN: No rashes or lesions    LABS:                        13.0   16.63 )-----------( 157      ( 17 Mar 2021 06:34 )             42.8     03-17    137  |  103  |  74<H>  ----------------------------<  321<H>  5.1   |  29  |  2.26<H>    Ca    8.6      17 Mar 2021 06:34  Phos  4.1     03-17  Mg     2.8     03-17    TPro  6.9  /  Alb  3.3  /  TBili  0.7  /  DBili  x   /  AST  65<H>  /  ALT  84<H>  /  AlkPhos  91  03-17        CAPILLARY BLOOD GLUCOSE      POCT Blood Glucose.: 283 mg/dL (17 Mar 2021 07:22)  POCT Blood Glucose.: 381 mg/dL (16 Mar 2021 21:10)  POCT Blood Glucose.: 446 mg/dL (16 Mar 2021 16:17)  POCT Blood Glucose.: 337 mg/dL (16 Mar 2021 11:16)          RADIOLOGY & ADDITIONAL TESTS:        Consultant(s) Notes Reviewed:  [ xx] YES  [ ] NO  
Patient is a 75y old  Male who presents with a chief complaint of Shortness of breath (18 Mar 2021 17:58)      SUBJECTIVE/OBJECTIVE:      Without complaints. Patient resting comfortably.       MEDICATIONS  (STANDING):  ALBUTerol    90 MICROgram(s) HFA Inhaler 1 Puff(s) Inhalation every 4 hours  allopurinol 200 milliGRAM(s) Oral daily  aspirin enteric coated 81 milliGRAM(s) Oral daily  dextrose 40% Gel 15 Gram(s) Oral once  dextrose 5%. 1000 milliLiter(s) (50 mL/Hr) IV Continuous <Continuous>  dextrose 50% Injectable 25 Gram(s) IV Push once  dextrose 50% Injectable 12.5 Gram(s) IV Push once  dextrose 50% Injectable 25 Gram(s) IV Push once  folic acid 1 milliGRAM(s) Oral daily  glucagon  Injectable 1 milliGRAM(s) IntraMuscular once  insulin glargine Injectable (LANTUS) 10 Unit(s) SubCutaneous at bedtime  insulin lispro (ADMELOG) corrective regimen sliding scale   SubCutaneous three times a day before meals  insulin lispro (ADMELOG) corrective regimen sliding scale   SubCutaneous at bedtime  insulin lispro Injectable (ADMELOG) 8 Unit(s) SubCutaneous three times a day before meals  methylPREDNISolone sodium succinate Injectable 20 milliGRAM(s) IV Push every 12 hours  metoprolol succinate ER 50 milliGRAM(s) Oral daily  pantoprazole    Tablet 40 milliGRAM(s) Oral before breakfast  rivaroxaban 15 milliGRAM(s) Oral daily  simvastatin 40 milliGRAM(s) Oral at bedtime  tamsulosin 0.4 milliGRAM(s) Oral at bedtime  tiotropium 18 MICROgram(s) Capsule 1 Capsule(s) Inhalation daily    MEDICATIONS  (PRN):      Allergies    No Known Allergies    Intolerances          Vital Signs Last 24 Hrs  T(C): 36.7 (19 Mar 2021 04:47), Max: 36.7 (19 Mar 2021 04:47)  T(F): 98 (19 Mar 2021 04:47), Max: 98 (19 Mar 2021 04:47)  HR: 80 (19 Mar 2021 08:56) (60 - 80)  BP: 154/67 (19 Mar 2021 04:47) (152/79 - 173/83)  BP(mean): --  RR: 16 (19 Mar 2021 04:47) (16 - 18)  SpO2: 96% (19 Mar 2021 08:56) (95% - 98%)    PHYSICAL EXAM:  GENERAL: NAD, well-groomed, well-developed  HEAD:  Atraumatic, Normocephalic  EYES: EOMI, PERRLA, conjunctiva and sclera clear  ENMT: No tonsillar erythema, exudates, or enlargement; Moist mucous membranes, No lesions  NECK: Supple, No JVD, Normal thyroid  NERVOUS SYSTEM:  Alert & Oriented X3; Motor Strength 5/5 B/L upper and lower extremities; DTRs 2+ intact and symmetric  CHEST/LUNG: Clear bilaterally; No rales, rhonchi, wheezing, or rubs  HEART: Regular rate and rhythm; No murmurs, rubs, or gallops  ABDOMEN: Soft, Nontender, Nondistended; Bowel sounds present  EXTREMITIES:  2+ Peripheral Pulses, No clubbing, cyanosis, or edema  LYMPH: No lymphadenopathy noted  SKIN: No rashes or lesions    LABS:                        13.8   17.60 )-----------( 173      ( 19 Mar 2021 09:41 )             44.7     03-19    140  |  107  |  62<H>  ----------------------------<  300<H>  5.3   |  28  |  2.01<H>    Ca    8.3<L>      19 Mar 2021 09:41  Phos  3.8     03-18  Mg     2.7     03-18    TPro  6.6  /  Alb  3.1<L>  /  TBili  1.0  /  DBili  .18  /  AST  202<H>  /  ALT  329<H>  /  AlkPhos  83  03-19        CAPILLARY BLOOD GLUCOSE      POCT Blood Glucose.: 333 mg/dL (19 Mar 2021 11:11)  POCT Blood Glucose.: 265 mg/dL (19 Mar 2021 08:02)  POCT Blood Glucose.: 297 mg/dL (18 Mar 2021 20:46)  POCT Blood Glucose.: 348 mg/dL (18 Mar 2021 16:04)          RADIOLOGY & ADDITIONAL TESTS:        Consultant(s) Notes Reviewed:  [ ] YES  [ ] NO

## 2021-03-19 NOTE — PROGRESS NOTE ADULT - PROBLEM SELECTOR PLAN 1
Pulmonary consult noted; Continue O2 and as needed BIPAP.  Steroids and bronchodilators.  Will add diuretics.  On rivaroxaban, reason not clear.  Echocardiogram.  O2 support

## 2021-03-19 NOTE — PROGRESS NOTE ADULT - ATTENDING COMMENTS
Continue current care and treatment.
LFTs elevated  Awaiting result  Discharge planning  Renal and Cardiac followup outpatient.
Pulmonary following;  Tapering steroids  PT  Cardiac consult noted; Pulmonary consultation and workup is ongoing and noted  the patient's diagnostic echocardiogram revealed a reduced ejection fraction as compared to a previous echocardiogram in 2019  The patient's mildly elevated troponin level is not likely to be indicative of a myocardial infarction   however given the patient's new finding On diagnostic echocardiogram with stated wall motion abnormalities   ischemic workup is suggested  A nuclear stress test will be ordered for this patient    For NST  Continue current care and treatment.
Pulmonary following;  Tapering steroids  PT  Cardiac consult. CHF  Continue current care and treatment.
Pulmonary following.  Tapering steroids  PT  Continue current care and treatment.
Cardiac noted; Pulmonary consultation and workup is ongoing and noted  the patient's diagnostic echocardiogram revealed a reduced ejection fraction as compared to a previous echocardiogram in 2019  However, the patient does have an ACID implanted and the EF is likely chronically decreased  The patient's mildly elevated troponin level is not likely to be indicative of a myocardial infarction   however given the patient's new finding On diagnostic echocardiogram with stated wall motion abnormalities ischemic workup is suggested  A nuclear stress test is noted and no significant ischemia is seen       Pulmonary, Renal following  Tapered steroids  Discharge planning  Continue current care and treatment.

## 2021-03-19 NOTE — DISCHARGE NOTE PROVIDER - PROVIDER TOKENS
PROVIDER:[TOKEN:[[id number]],FOLLOWUP:[2 weeks]],FREE:[LAST:[Young],FIRST:[Jenelle],PHONE:[(   )    -],FAX:[(   )    -],ADDRESS:[FOLLOW UP in 5 DAYS for repeat blood work.  [address] [place] [address]]],FREE:[LAST:[Braeden],FIRST:[Jenelle],PHONE:[phone]],FAX:[(   )    -],ADDRESS:[FOLLOW UP IN 5 DAYS for repeat blood work    [address] [place] [address]]],FREE:[LAST:[Cardiologist],PHONE:[(   )    -],FAX:[(   )    -],FOLLOWUP:[2 weeks]]

## 2021-03-19 NOTE — PROGRESS NOTE ADULT - ASSESSMENT
Admitted for Acute Hypoxic Respiratory Failure 2/2 Exacerbation COPD, CHF

## 2021-03-19 NOTE — PROGRESS NOTE ADULT - PROVIDER SPECIALTY LIST ADULT
Cardiology
Nephrology
Pulmonology
Nephrology
Pulmonology
Pulmonology
Internal Medicine

## 2021-03-19 NOTE — PROGRESS NOTE ADULT - PROBLEM SELECTOR PLAN 5
FS with Insulin and sliding scale  Lantus added

## 2021-03-19 NOTE — PROGRESS NOTE ADULT - REASON FOR ADMISSION
Shortness of breath

## 2021-03-19 NOTE — PROGRESS NOTE ADULT - PROBLEM SELECTOR PROBLEM 5
Type 2 diabetes mellitus with hyperglycemia, unspecified whether long term insulin use

## 2021-03-19 NOTE — DISCHARGE NOTE PROVIDER - NSDCMRMEDTOKEN_GEN_ALL_CORE_FT
allopurinol 100 mg oral tablet: 2 tab(s) orally once a day  aspirin 81 mg oral tablet: 1 tab(s) orally once a day  folic acid 1 mg oral tablet: 1 tab(s) orally once a day  ipratropium-albuterol 0.5 mg-2.5 mg/3 mLinhalation solution: 3 milliliter(s) inhaled every 6 hours, As Needed SOB wheezing  Januvia 50 mg oral tablet: 1 tab(s) orally once a day  Lasix 40 mg oral tablet: 1 tab(s) orally once a day  Medrol Dosepak 4 mg oral tablet: Follow taper instructions per dosepak instructions.  Metoprolol Succinate ER 50 mg oral tablet, extended release: 1.5 tab(s) orally once a day  nateglinide 120 mg oral tablet: 1 tab(s) orally 3 times a day (before meals)  tamsulosin 0.4 mg oral capsule: 1 cap(s) orally once a day  Toujeo SoloStar 300 units/mL subcutaneous solution: 53  subcutaneous once a day (at bedtime)  Trulicity Pen 1.5 mg/0.5 mL subcutaneous solution: once a week  Ventolin HFA 90 mcg/inh inhalation aerosol: 2 puff(s) inhaled every 6 hours, As Needed sob/wheezing  Xarelto 20 mg oral tablet: 1 tab(s) orally once a day (in the evening)  Zocor 40 mg oral tablet: 1 tab(s) orally once a day (at bedtime)

## 2021-03-19 NOTE — PROGRESS NOTE ADULT - PROBLEM SELECTOR PLAN 3
CE  IV Lasix  Echo noted
CE  IV Lasix  Echo
CE  IV Lasix  Echo noted
CE  IV Lasix  Echo noted
CE  IV Lasix  Echo
CE  IV Lasix  Echo noted

## 2021-03-19 NOTE — DISCHARGE NOTE PROVIDER - CARE PROVIDERS DIRECT ADDRESSES
[email or web address],DirectAddress_Unknown,DirectAddress_Unknown,DirectAddress_Unknown

## 2021-03-19 NOTE — PROGRESS NOTE ADULT - PROBLEM SELECTOR PROBLEM 3
Acute diastolic congestive heart failure

## 2021-03-19 NOTE — PROGRESS NOTE ADULT - PROBLEM SELECTOR PLAN 4
Continue Xarelto, Statin

## 2021-03-19 NOTE — DISCHARGE NOTE PROVIDER - NSDCCPCAREPLAN_GEN_ALL_CORE_FT
PRINCIPAL DISCHARGE DIAGNOSIS  Diagnosis: COPD exacerbation  Assessment and Plan of Treatment: Admitted for acute respiratory failure with hypoxia. Pulmonary consulted. Placed on O2 and as needed BIPAP.   Received steroids and bronchodilators.  Pulmonary status improved and back to baseline 2L NC.  Continue with inhalers at home and medrol dosepak.         SECONDARY DISCHARGE DIAGNOSES  Diagnosis: Elevated LFTs  Assessment and Plan of Treatment: -Follow up w/ Dr. Deras outpatient in 5 days for repeat blood work.    Diagnosis: DENISHA (acute kidney injury)  Assessment and Plan of Treatment: Stable, follow up with Dr. Rowe outpatient.    Diagnosis: CAD (coronary artery disease)  Assessment and Plan of Treatment: Stable, continue with home meds and follow up outpatient cardiologist.    Diagnosis: Type 2 diabetes mellitus with hyperglycemia, unspecified whether long term insulin use  Assessment and Plan of Treatment: Stable, continue with home meds    Diagnosis: CHF (congestive heart failure)  Assessment and Plan of Treatment: -Plan: Cardiology consulted  -IV Lasix  -Echo completed.   -Stable, follow up with outpatient cardiologist and continue home meds.

## 2021-03-19 NOTE — DISCHARGE NOTE PROVIDER - CARE PROVIDER_API CALL
Jon Rowe  INTERNAL MEDICINE  [address] [facility], [address]  [place], [place] [id number]  Phone: [phone]  Fax: [phone]  Follow Up Time: 2 weeks    Jenelle Deras  FOLLOW UP in 5 DAYS for repeat blood work.  [address] [address]  Phone: (   )    -  Fax: (   )    -  Follow Up Time:     Jenelle Deras  FOLLOW UP IN 5 DAYS for repeat blood work    [address] [facility], [address] [place] [address]  Phone: [phone]  Fax: (   )    -  Follow Up Time:     Cardiologist,   Phone: (   )    -  Fax: (   )    -  Follow Up Time: 2 weeks

## 2021-03-19 NOTE — PROGRESS NOTE ADULT - PROBLEM SELECTOR PLAN 2
IV Rocephin  Albuterol inhaler

## 2021-03-19 NOTE — DISCHARGE NOTE PROVIDER - HOSPITAL COURSE
Pt is a 74yo, BM with h/o HTN, DM, COPD (on home o2 2L), CHF, PPM/AICD presented to ED biba from home with sudden onset of sob, states that he was watching television when his symptoms occurred, shortly before he did ambulate to the bathroom +chest tightness (-) fevers or chills (-) cough (-) flu like symptoms, (-) leg edema. Notes CP.   In the field pt's o2 saturation was 69%, he was given dexamethasone and albuterol and placed on cpap.    Admitted for acute respiratory failure with hypoxia. Pulmonary consulted. Placed on O2 and as needed BIPAP.   Received steroids and bronchodilators.  Pulmonary status improved and back to baseline 2L NC.    COPD exacerbation.    -Plan: IV Rocephin  -Albuterol inhaler.     Acute diastolic congestive heart failure.    -Plan: Cardiology consulted  -IV Lasix  -Echo completed.   -Stable, follow up with outpatient cardiologist.    CAD (coronary artery disease).    -Plan: Continue Xarelto, Statin.   -Stable, continue with xarelto, statin.    Type 2 diabetes mellitus with hyperglycemia, unspecified whether long term insulin use.    -Plan: FS with Insulin and sliding scale  -Lantus added.   -Stable, continue with home regimen.    DENISHA (acute kidney injury). Plan: CKD3  -Renal following.  -Stable, follow w/ Dr. Rowe outpatient.    Elevated LFTs  -Follow up w/ Dr. Deras outpatient in 5 days for repeat blood work.      Pt was examined and seen by Dr. Deras, deemed clinically stable for discharge.

## 2021-03-26 DIAGNOSIS — I27.20 PULMONARY HYPERTENSION, UNSPECIFIED: ICD-10-CM

## 2021-03-26 DIAGNOSIS — J44.1 CHRONIC OBSTRUCTIVE PULMONARY DISEASE WITH (ACUTE) EXACERBATION: ICD-10-CM

## 2021-03-26 DIAGNOSIS — G47.33 OBSTRUCTIVE SLEEP APNEA (ADULT) (PEDIATRIC): ICD-10-CM

## 2021-03-26 DIAGNOSIS — E11.22 TYPE 2 DIABETES MELLITUS WITH DIABETIC CHRONIC KIDNEY DISEASE: ICD-10-CM

## 2021-03-26 DIAGNOSIS — E66.9 OBESITY, UNSPECIFIED: ICD-10-CM

## 2021-03-26 DIAGNOSIS — E11.65 TYPE 2 DIABETES MELLITUS WITH HYPERGLYCEMIA: ICD-10-CM

## 2021-03-26 DIAGNOSIS — I25.10 ATHEROSCLEROTIC HEART DISEASE OF NATIVE CORONARY ARTERY WITHOUT ANGINA PECTORIS: ICD-10-CM

## 2021-03-26 DIAGNOSIS — E78.5 HYPERLIPIDEMIA, UNSPECIFIED: ICD-10-CM

## 2021-03-26 DIAGNOSIS — J96.21 ACUTE AND CHRONIC RESPIRATORY FAILURE WITH HYPOXIA: ICD-10-CM

## 2021-03-26 DIAGNOSIS — N18.30 CHRONIC KIDNEY DISEASE, STAGE 3 UNSPECIFIED: ICD-10-CM

## 2021-03-26 DIAGNOSIS — Z95.810 PRESENCE OF AUTOMATIC (IMPLANTABLE) CARDIAC DEFIBRILLATOR: ICD-10-CM

## 2021-03-26 DIAGNOSIS — M10.9 GOUT, UNSPECIFIED: ICD-10-CM

## 2021-03-26 DIAGNOSIS — I50.43 ACUTE ON CHRONIC COMBINED SYSTOLIC (CONGESTIVE) AND DIASTOLIC (CONGESTIVE) HEART FAILURE: ICD-10-CM

## 2021-03-26 DIAGNOSIS — D72.829 ELEVATED WHITE BLOOD CELL COUNT, UNSPECIFIED: ICD-10-CM

## 2021-03-26 DIAGNOSIS — Z99.89 DEPENDENCE ON OTHER ENABLING MACHINES AND DEVICES: ICD-10-CM

## 2021-03-26 DIAGNOSIS — Z95.1 PRESENCE OF AORTOCORONARY BYPASS GRAFT: ICD-10-CM

## 2021-03-26 DIAGNOSIS — N17.9 ACUTE KIDNEY FAILURE, UNSPECIFIED: ICD-10-CM

## 2021-03-26 DIAGNOSIS — I08.8 OTHER RHEUMATIC MULTIPLE VALVE DISEASES: ICD-10-CM

## 2021-03-26 DIAGNOSIS — I13.0 HYPERTENSIVE HEART AND CHRONIC KIDNEY DISEASE WITH HEART FAILURE AND STAGE 1 THROUGH STAGE 4 CHRONIC KIDNEY DISEASE, OR UNSPECIFIED CHRONIC KIDNEY DISEASE: ICD-10-CM

## 2021-07-10 ENCOUNTER — INPATIENT (INPATIENT)
Facility: HOSPITAL | Age: 76
LOS: 4 days | Discharge: HOME HEALTH | End: 2021-07-15
Attending: INTERNAL MEDICINE | Admitting: INTERNAL MEDICINE
Payer: MEDICARE

## 2021-07-10 VITALS
OXYGEN SATURATION: 95 % | HEIGHT: 70 IN | SYSTOLIC BLOOD PRESSURE: 131 MMHG | HEART RATE: 77 BPM | WEIGHT: 244.93 LBS | DIASTOLIC BLOOD PRESSURE: 90 MMHG | RESPIRATION RATE: 28 BRPM

## 2021-07-10 DIAGNOSIS — Z98.89 OTHER SPECIFIED POSTPROCEDURAL STATES: Chronic | ICD-10-CM

## 2021-07-10 DIAGNOSIS — Z95.1 PRESENCE OF AORTOCORONARY BYPASS GRAFT: Chronic | ICD-10-CM

## 2021-07-10 DIAGNOSIS — Z95.0 PRESENCE OF CARDIAC PACEMAKER: Chronic | ICD-10-CM

## 2021-07-10 LAB
APTT BLD: 28.9 SEC — SIGNIFICANT CHANGE UP (ref 27.5–35.5)
BASE EXCESS BLDA CALC-SCNC: 2.8 MMOL/L — HIGH (ref -2–2)
BASOPHILS # BLD AUTO: 0.04 K/UL — SIGNIFICANT CHANGE UP (ref 0–0.2)
BASOPHILS NFR BLD AUTO: 0.3 % — SIGNIFICANT CHANGE UP (ref 0–2)
BLOOD GAS COMMENTS: SIGNIFICANT CHANGE UP
BLOOD GAS COMMENTS: SIGNIFICANT CHANGE UP
BLOOD GAS SOURCE: SIGNIFICANT CHANGE UP
EOSINOPHIL # BLD AUTO: 0.08 K/UL — SIGNIFICANT CHANGE UP (ref 0–0.5)
EOSINOPHIL NFR BLD AUTO: 0.5 % — SIGNIFICANT CHANGE UP (ref 0–6)
HCO3 BLDA-SCNC: 28 MMOL/L — SIGNIFICANT CHANGE UP (ref 21–29)
HCT VFR BLD CALC: 44.2 % — SIGNIFICANT CHANGE UP (ref 39–50)
HGB BLD-MCNC: 13.5 G/DL — SIGNIFICANT CHANGE UP (ref 13–17)
HOROWITZ INDEX BLDA+IHG-RTO: 60 — SIGNIFICANT CHANGE UP
IMM GRANULOCYTES NFR BLD AUTO: 0.9 % — SIGNIFICANT CHANGE UP (ref 0–1.5)
INR BLD: 2.23 RATIO — HIGH (ref 0.88–1.16)
LACTATE SERPL-SCNC: 3.1 MMOL/L — HIGH (ref 0.7–2)
LYMPHOCYTES # BLD AUTO: 0.78 K/UL — LOW (ref 1–3.3)
LYMPHOCYTES # BLD AUTO: 5 % — LOW (ref 13–44)
MCHC RBC-ENTMCNC: 29.4 PG — SIGNIFICANT CHANGE UP (ref 27–34)
MCHC RBC-ENTMCNC: 30.5 GM/DL — LOW (ref 32–36)
MCV RBC AUTO: 96.3 FL — SIGNIFICANT CHANGE UP (ref 80–100)
MONOCYTES # BLD AUTO: 1.01 K/UL — HIGH (ref 0–0.9)
MONOCYTES NFR BLD AUTO: 6.5 % — SIGNIFICANT CHANGE UP (ref 2–14)
NEUTROPHILS # BLD AUTO: 13.5 K/UL — HIGH (ref 1.8–7.4)
NEUTROPHILS NFR BLD AUTO: 86.8 % — HIGH (ref 43–77)
NRBC # BLD: 0 /100 WBCS — SIGNIFICANT CHANGE UP (ref 0–0)
NT-PROBNP SERPL-SCNC: 5027 PG/ML — HIGH (ref 0–450)
PCO2 BLDA: 49 MMHG — HIGH (ref 32–46)
PH BLD: 7.38 — SIGNIFICANT CHANGE UP (ref 7.35–7.45)
PLATELET # BLD AUTO: 151 K/UL — SIGNIFICANT CHANGE UP (ref 150–400)
PO2 BLDA: 139 MMHG — HIGH (ref 74–108)
PROTHROM AB SERPL-ACNC: 24.9 SEC — HIGH (ref 10.6–13.6)
RBC # BLD: 4.59 M/UL — SIGNIFICANT CHANGE UP (ref 4.2–5.8)
RBC # FLD: 17.1 % — HIGH (ref 10.3–14.5)
SAO2 % BLDA: 99 % — HIGH (ref 92–96)
WBC # BLD: 15.55 K/UL — HIGH (ref 3.8–10.5)
WBC # FLD AUTO: 15.55 K/UL — HIGH (ref 3.8–10.5)

## 2021-07-10 PROCEDURE — 93010 ELECTROCARDIOGRAM REPORT: CPT

## 2021-07-10 PROCEDURE — 99291 CRITICAL CARE FIRST HOUR: CPT

## 2021-07-10 RX ORDER — CEFTRIAXONE 500 MG/1
1000 INJECTION, POWDER, FOR SOLUTION INTRAMUSCULAR; INTRAVENOUS ONCE
Refills: 0 | Status: COMPLETED | OUTPATIENT
Start: 2021-07-10 | End: 2021-07-10

## 2021-07-10 RX ORDER — AZITHROMYCIN 500 MG/1
500 TABLET, FILM COATED ORAL ONCE
Refills: 0 | Status: COMPLETED | OUTPATIENT
Start: 2021-07-10 | End: 2021-07-10

## 2021-07-10 RX ADMIN — CEFTRIAXONE 100 MILLIGRAM(S): 500 INJECTION, POWDER, FOR SOLUTION INTRAMUSCULAR; INTRAVENOUS at 23:40

## 2021-07-10 NOTE — ED PROVIDER NOTE - CARDIOVASCULAR NEGATIVE STATEMENT, MLM
Head: atraumatic, normacephalic  eyes: perrla eomi  heart: rrr s1s2  lungs: ctab  abd: soft, nt nd +bs no rebound/guarding no cva ttp  skin: warm  LE: no swelling, no calf ttp  back: no midline cervical/thoracic/lumbar ttp no chest pain and no edema.

## 2021-07-10 NOTE — ED PROVIDER NOTE - OBJECTIVE STATEMENT
75M hx DM, HTN, HLD, Obesity, COPD (on home o2 2L), CHF, CAD s/p quadruple bypass with PPM/AICD, presenting with difficulty breathing x 2 days with acute worsening this afternoon. EMS called, pt found to be in resp distress; pt placed on cpap and brought to ED. Found to be hypertensive in field to 180s / 90s with rales in bilateral lung fields. Given 5 nitroglycerin sprays, brought bp to 130s/80s.     Pt is double vaccinated for covid. Took 80mg of lasix today.

## 2021-07-10 NOTE — ED ADULT NURSE NOTE - OBJECTIVE STATEMENT
pt received to bed 13 with acute shortness of breath starting yesterday. pt placed on cpap with EMS. denies: chest pain, headache, dizziness, fever, chills. on cardiac monitor at bedside

## 2021-07-10 NOTE — ED ADULT NURSE NOTE - ED STAT RN HANDOFF DETAILS
Report endorsed to hold RN Dana. Safety checks completed this shift. Safety rounds completed hourly.  IV sites checked Q2+remains WDL. Medications administered as ordered with no signs/symptoms of adverse reactions. Fall & skin precautions in place. Any issues endorsed to hold RN for follow up. Awaiting bed assignment. on cardiac monitor at bedside. on bipap/cpap

## 2021-07-10 NOTE — ED PROVIDER NOTE - CLINICAL SUMMARY MEDICAL DECISION MAKING FREE TEXT BOX
Pt w/ significant cardiopulmonary hx p/f increased wob with diminished breath sounds and crackles in bases. Satting mid/upper 90s on bipap. Will monitor for decompensation / need for invasive airway. In interim labs, trop/bnp, cxr. Pt to be admitted.

## 2021-07-10 NOTE — ED PROVIDER NOTE - PSH
S/P CABG x 3  cabg3  in 2003  S/P cardiac cath  cardiac stentin 2011  S/P cardiac pacemaker procedure  defibrilator 2012  S/P hernia repair  hernia [id number]

## 2021-07-11 PROBLEM — Z95.0 PRESENCE OF CARDIAC PACEMAKER: Chronic | Status: ACTIVE | Noted: 2021-03-13

## 2021-07-11 PROBLEM — Z95.810 PRESENCE OF AUTOMATIC (IMPLANTABLE) CARDIAC DEFIBRILLATOR: Chronic | Status: ACTIVE | Noted: 2021-03-13

## 2021-07-11 LAB
ALBUMIN SERPL ELPH-MCNC: 3.5 G/DL — SIGNIFICANT CHANGE UP (ref 3.3–5)
ALP SERPL-CCNC: 95 U/L — SIGNIFICANT CHANGE UP (ref 40–120)
ALT FLD-CCNC: 83 U/L — HIGH (ref 12–78)
ANION GAP SERPL CALC-SCNC: 9 MMOL/L — SIGNIFICANT CHANGE UP (ref 5–17)
AST SERPL-CCNC: 89 U/L — HIGH (ref 15–37)
BILIRUB SERPL-MCNC: 1 MG/DL — SIGNIFICANT CHANGE UP (ref 0.2–1.2)
BUN SERPL-MCNC: 38 MG/DL — HIGH (ref 7–23)
CALCIUM SERPL-MCNC: 8.1 MG/DL — LOW (ref 8.5–10.1)
CHLORIDE SERPL-SCNC: 104 MMOL/L — SIGNIFICANT CHANGE UP (ref 96–108)
CO2 SERPL-SCNC: 25 MMOL/L — SIGNIFICANT CHANGE UP (ref 22–31)
CREAT SERPL-MCNC: 2 MG/DL — HIGH (ref 0.5–1.3)
GLUCOSE BLDC GLUCOMTR-MCNC: 284 MG/DL — HIGH (ref 70–99)
GLUCOSE BLDC GLUCOMTR-MCNC: 284 MG/DL — HIGH (ref 70–99)
GLUCOSE BLDC GLUCOMTR-MCNC: 295 MG/DL — HIGH (ref 70–99)
GLUCOSE BLDC GLUCOMTR-MCNC: 308 MG/DL — HIGH (ref 70–99)
GLUCOSE BLDC GLUCOMTR-MCNC: 345 MG/DL — HIGH (ref 70–99)
GLUCOSE BLDC GLUCOMTR-MCNC: 428 MG/DL — HIGH (ref 70–99)
GLUCOSE SERPL-MCNC: 441 MG/DL — HIGH (ref 70–99)
POTASSIUM SERPL-MCNC: 4.8 MMOL/L — SIGNIFICANT CHANGE UP (ref 3.5–5.3)
POTASSIUM SERPL-SCNC: 4.8 MMOL/L — SIGNIFICANT CHANGE UP (ref 3.5–5.3)
PROT SERPL-MCNC: 7.1 GM/DL — SIGNIFICANT CHANGE UP (ref 6–8.3)
RAPID RVP RESULT: SIGNIFICANT CHANGE UP
SARS-COV-2 RNA SPEC QL NAA+PROBE: SIGNIFICANT CHANGE UP
SODIUM SERPL-SCNC: 138 MMOL/L — SIGNIFICANT CHANGE UP (ref 135–145)
TROPONIN I SERPL-MCNC: 0.19 NG/ML — HIGH (ref 0.01–0.04)
TROPONIN I SERPL-MCNC: 0.3 NG/ML — HIGH (ref 0.01–0.04)

## 2021-07-11 PROCEDURE — 99222 1ST HOSP IP/OBS MODERATE 55: CPT

## 2021-07-11 PROCEDURE — 71045 X-RAY EXAM CHEST 1 VIEW: CPT | Mod: 26

## 2021-07-11 PROCEDURE — 99223 1ST HOSP IP/OBS HIGH 75: CPT

## 2021-07-11 PROCEDURE — 76700 US EXAM ABDOM COMPLETE: CPT | Mod: 26

## 2021-07-11 RX ORDER — INSULIN LISPRO 100/ML
VIAL (ML) SUBCUTANEOUS
Refills: 0 | Status: DISCONTINUED | OUTPATIENT
Start: 2021-07-11 | End: 2021-07-15

## 2021-07-11 RX ORDER — TAMSULOSIN HYDROCHLORIDE 0.4 MG/1
0.4 CAPSULE ORAL AT BEDTIME
Refills: 0 | Status: DISCONTINUED | OUTPATIENT
Start: 2021-07-11 | End: 2021-07-15

## 2021-07-11 RX ORDER — METOPROLOL TARTRATE 50 MG
75 TABLET ORAL DAILY
Refills: 0 | Status: DISCONTINUED | OUTPATIENT
Start: 2021-07-11 | End: 2021-07-15

## 2021-07-11 RX ORDER — DEXTROSE 50 % IN WATER 50 %
25 SYRINGE (ML) INTRAVENOUS ONCE
Refills: 0 | Status: DISCONTINUED | OUTPATIENT
Start: 2021-07-11 | End: 2021-07-15

## 2021-07-11 RX ORDER — SODIUM CHLORIDE 9 MG/ML
1000 INJECTION, SOLUTION INTRAVENOUS
Refills: 0 | Status: DISCONTINUED | OUTPATIENT
Start: 2021-07-11 | End: 2021-07-15

## 2021-07-11 RX ORDER — ASPIRIN/CALCIUM CARB/MAGNESIUM 324 MG
81 TABLET ORAL DAILY
Refills: 0 | Status: DISCONTINUED | OUTPATIENT
Start: 2021-07-11 | End: 2021-07-15

## 2021-07-11 RX ORDER — ASPIRIN/CALCIUM CARB/MAGNESIUM 324 MG
162 TABLET ORAL ONCE
Refills: 0 | Status: COMPLETED | OUTPATIENT
Start: 2021-07-11 | End: 2021-07-11

## 2021-07-11 RX ORDER — INSULIN LISPRO 100/ML
5 VIAL (ML) SUBCUTANEOUS
Refills: 0 | Status: DISCONTINUED | OUTPATIENT
Start: 2021-07-11 | End: 2021-07-13

## 2021-07-11 RX ORDER — GLUCAGON INJECTION, SOLUTION 0.5 MG/.1ML
1 INJECTION, SOLUTION SUBCUTANEOUS ONCE
Refills: 0 | Status: DISCONTINUED | OUTPATIENT
Start: 2021-07-11 | End: 2021-07-15

## 2021-07-11 RX ORDER — IPRATROPIUM/ALBUTEROL SULFATE 18-103MCG
3 AEROSOL WITH ADAPTER (GRAM) INHALATION EVERY 6 HOURS
Refills: 0 | Status: DISCONTINUED | OUTPATIENT
Start: 2021-07-11 | End: 2021-07-15

## 2021-07-11 RX ORDER — RIVAROXABAN 15 MG-20MG
15 KIT ORAL
Refills: 0 | Status: DISCONTINUED | OUTPATIENT
Start: 2021-07-12 | End: 2021-07-15

## 2021-07-11 RX ORDER — FUROSEMIDE 40 MG
40 TABLET ORAL ONCE
Refills: 0 | Status: COMPLETED | OUTPATIENT
Start: 2021-07-11 | End: 2021-07-11

## 2021-07-11 RX ORDER — SIMVASTATIN 20 MG/1
40 TABLET, FILM COATED ORAL AT BEDTIME
Refills: 0 | Status: DISCONTINUED | OUTPATIENT
Start: 2021-07-11 | End: 2021-07-15

## 2021-07-11 RX ORDER — INSULIN HUMAN 100 [IU]/ML
5 INJECTION, SOLUTION SUBCUTANEOUS ONCE
Refills: 0 | Status: COMPLETED | OUTPATIENT
Start: 2021-07-11 | End: 2021-07-11

## 2021-07-11 RX ORDER — INSULIN GLARGINE 100 [IU]/ML
10 INJECTION, SOLUTION SUBCUTANEOUS AT BEDTIME
Refills: 0 | Status: DISCONTINUED | OUTPATIENT
Start: 2021-07-11 | End: 2021-07-13

## 2021-07-11 RX ORDER — IPRATROPIUM/ALBUTEROL SULFATE 18-103MCG
3 AEROSOL WITH ADAPTER (GRAM) INHALATION ONCE
Refills: 0 | Status: COMPLETED | OUTPATIENT
Start: 2021-07-11 | End: 2021-07-11

## 2021-07-11 RX ORDER — ALLOPURINOL 300 MG
100 TABLET ORAL DAILY
Refills: 0 | Status: DISCONTINUED | OUTPATIENT
Start: 2021-07-11 | End: 2021-07-15

## 2021-07-11 RX ORDER — FUROSEMIDE 40 MG
40 TABLET ORAL
Refills: 0 | Status: DISCONTINUED | OUTPATIENT
Start: 2021-07-11 | End: 2021-07-15

## 2021-07-11 RX ORDER — DEXTROSE 50 % IN WATER 50 %
12.5 SYRINGE (ML) INTRAVENOUS ONCE
Refills: 0 | Status: DISCONTINUED | OUTPATIENT
Start: 2021-07-11 | End: 2021-07-15

## 2021-07-11 RX ORDER — DEXTROSE 50 % IN WATER 50 %
15 SYRINGE (ML) INTRAVENOUS ONCE
Refills: 0 | Status: DISCONTINUED | OUTPATIENT
Start: 2021-07-11 | End: 2021-07-15

## 2021-07-11 RX ADMIN — Medication 5 UNIT(S): at 09:37

## 2021-07-11 RX ADMIN — CEFTRIAXONE 1000 MILLIGRAM(S): 500 INJECTION, POWDER, FOR SOLUTION INTRAMUSCULAR; INTRAVENOUS at 00:10

## 2021-07-11 RX ADMIN — Medication 40 MILLIGRAM(S): at 05:21

## 2021-07-11 RX ADMIN — Medication 100 MILLIGRAM(S): at 11:43

## 2021-07-11 RX ADMIN — Medication 3 MILLILITER(S): at 06:04

## 2021-07-11 RX ADMIN — Medication 40 MILLIGRAM(S): at 22:02

## 2021-07-11 RX ADMIN — Medication 3 MILLILITER(S): at 11:01

## 2021-07-11 RX ADMIN — Medication 40 MILLIGRAM(S): at 14:11

## 2021-07-11 RX ADMIN — Medication 4: at 09:35

## 2021-07-11 RX ADMIN — INSULIN HUMAN 5 UNIT(S): 100 INJECTION, SOLUTION SUBCUTANEOUS at 02:45

## 2021-07-11 RX ADMIN — AZITHROMYCIN 255 MILLIGRAM(S): 500 TABLET, FILM COATED ORAL at 00:19

## 2021-07-11 RX ADMIN — Medication 3: at 17:20

## 2021-07-11 RX ADMIN — Medication 3 MILLILITER(S): at 17:00

## 2021-07-11 RX ADMIN — Medication 4: at 11:43

## 2021-07-11 RX ADMIN — Medication 5 UNIT(S): at 11:43

## 2021-07-11 RX ADMIN — SIMVASTATIN 40 MILLIGRAM(S): 20 TABLET, FILM COATED ORAL at 22:05

## 2021-07-11 RX ADMIN — Medication 162 MILLIGRAM(S): at 00:48

## 2021-07-11 RX ADMIN — Medication 3 MILLILITER(S): at 09:49

## 2021-07-11 RX ADMIN — INSULIN GLARGINE 10 UNIT(S): 100 INJECTION, SOLUTION SUBCUTANEOUS at 22:08

## 2021-07-11 RX ADMIN — TAMSULOSIN HYDROCHLORIDE 0.4 MILLIGRAM(S): 0.4 CAPSULE ORAL at 22:08

## 2021-07-11 RX ADMIN — Medication 40 MILLIGRAM(S): at 17:21

## 2021-07-11 RX ADMIN — AZITHROMYCIN 500 MILLIGRAM(S): 500 TABLET, FILM COATED ORAL at 01:19

## 2021-07-11 RX ADMIN — Medication 81 MILLIGRAM(S): at 11:43

## 2021-07-11 RX ADMIN — Medication 5 UNIT(S): at 17:21

## 2021-07-11 RX ADMIN — Medication 75 MILLIGRAM(S): at 05:22

## 2021-07-11 RX ADMIN — Medication 40 MILLIGRAM(S): at 02:26

## 2021-07-11 NOTE — H&P ADULT - ASSESSMENT
75M hx DM, HTN, HLD, Obesity, COPD (on home o2 2L), CHF, CAD s/p quadruple bypass with PPM/AICD, presenting with difficulty breathing x 2 days with acute worsening this afternoon. EMS called, pt found to be in resp distress; pt placed on cpap and brought to ED. Found to be hypertensive in field to 180s / 90s with rales in bilateral lung fields. Given 5 nitroglycerin sprays, brought bp to 130s/80s.     Acute on chronic hypercarbic respiratory failure:      COPD exacerbation.        Acute on chronic combined systolic and diastolic CHF:        CAD:    DM with hyperglycemia:    CKD stage 3:      Elevated LFTs:    Chronic A fib:    EMBER:    Morbid obesity:  HLD    DVT ppx 75M hx DM, HTN, HLD, Obesity, COPD (on home o2 2L), CHF, CAD s/p quadruple bypass with PPM/AICD, presenting with difficulty breathing x 2 days with acute worsening this afternoon. EMS called, pt found to be in resp distress; pt placed on cpap and brought to ED. Found to be hypertensive in field to 180s / 90s with rales in bilateral lung fields. Given 5 nitroglycerin sprays, brought bp to 130s/80s.     Acute on chronic hypercarbic respiratory failure:  - Improved on BIPAP  - 2/2 COPD, CHF  - Repeat ABG in am       COPD exacerbation:  - Continue Solumedrol, taper down as indicated  - Continue Duo neb  - Has home O2      Acute on chronic combined systolic and diastolic CHF:  - EF 35%, mod AR, mod AS  - S/PO AICD  - Continue IV Lasix  - Fluid and salt restriction daily wt  - elevated trop, continue to trend    Hypertensive urgency:  - BP improved  - Continue current meds    CAD:  - Continue ASA, statin    DM with hyperglycemia:  - Check Hb aqc  - Continue current insulin regimen, monitor to make further adjustment     CKD stage 3:  - Cr stable at baseline  - Avoid nephrotoxics    Elevated LFTs:  - Improved since last admission  - Check ab sono    Chronic A fib:  - Rate controlled, on BB  - Continue Xarelto ( renally dosed)    EMBER:  - Continue CPAP    Morbid obesity:  - Nutrition eval    HLD:  - Continue statin , hold if LFTs worsens    DVT ppx 75M hx DM, HTN, HLD, Obesity, COPD (on home o2 2L), CHF, CAD s/p quadruple bypass with PPM/AICD, presenting with difficulty breathing x 2 days with acute worsening this afternoon. EMS called, pt found to be in resp distress; pt placed on cpap and brought to ED. Found to be hypertensive in field to 180s / 90s with rales in bilateral lung fields. Given 5 nitroglycerin sprays, brought bp to 130s/80s.     Acute on chronic hypercarbic respiratory failure:  - Improved on BIPAP  - 2/2 COPD, CHF  - Repeat ABG in am     COPD exacerbation:  - Continue Solumedrol, taper down as indicated  - Continue Duo neb  - Has home O2    Acute on chronic combined systolic and diastolic CHF:  - EF 35%, mod AR, mod AS  - S/PO AICD  - Continue IV Lasix  - Fluid and salt restriction daily wt  - elevated trop, continue to trend    Hypertensive urgency:  - BP improved  - Continue current meds    CAD:  - Continue ASA, statin    DM with hyperglycemia:  - Check Hb aqc  - Continue current insulin regimen, monitor to make further adjustment     CKD stage 3:  - Cr stable at baseline  - Avoid nephrotoxics    Elevated LFTs:  - Improved since last admission  - Check ab sono    Chronic A fib:  - Rate controlled, on BB  - Continue Xarelto ( renally dosed)    EMBER:  - Continue CPAP    Morbid obesity:  - Nutrition eval    HLD:  - Continue statin , hold if LFTs worsens    DVT ppx

## 2021-07-11 NOTE — H&P ADULT - NSHPPHYSICALEXAM_GEN_ALL_CORE
GENERAL: NAD, well-groomed, well-developed  HEAD:  Atraumatic, Normocephalic  EYES: EOMI, PERRLA, conjunctiva and sclera clear  ENMT: No tonsillar erythema, exudates, or enlargement; Moist mucous membranes, Good dentition, No lesions  NECK: Supple, No JVD, Normal thyroid  NERVOUS SYSTEM:  Alert & Oriented X3, Good concentration; Motor Strength 5/5 B/L upper and lower extremities; DTRs 2+ intact and symmetric  CHEST/LUNG: Diminished, on BIPAP  HEART: Regular rate and rhythm; No murmurs, rubs, or gallops  ABDOMEN: Soft, Nontender, Nondistended; Bowel sounds present  EXTREMITIES:  2+ Peripheral Pulses, No clubbing, cyanosis, or edema  LYMPH: No lymphadenopathy noted GENERAL: NAD, obese  HEAD:  Atraumatic, Normocephalic  EYES: EOMI, PERRLA, conjunctiva and sclera clear  ENMT: No tonsillar erythema, exudates, or enlargement; Moist mucous membranes, Good dentition, No lesions  NECK: Supple, No JVD, Normal thyroid  NERVOUS SYSTEM:  Alert & Oriented X3, Good concentration; Motor Strength 5/5 B/L upper and lower extremities; DTRs 2+ intact and symmetric  CHEST/LUNG: Diminished, on BIPAP  HEART: Regular rate and rhythm; No murmurs, rubs, or gallops  ABDOMEN: Soft, Nontender, Nondistended; Bowel sounds present  EXTREMITIES:  2+ Peripheral Pulses, No clubbing, cyanosis, or edema  LYMPH: No lymphadenopathy noted

## 2021-07-11 NOTE — CONSULT NOTE ADULT - ASSESSMENT
75MM hx DM, HTN, HLD, Obesity, COPD (on home o2 2L), CHF, CAD s/p quadruple bypass with PPM/AICD, presenting with difficulty breathing x 2 days with acute worsening this afternoon. EMS called, pt found to be in resp distress; pt placed on cpap and brought to ED. Found to be hypertensive in field to 180s / 90s with rales in bilateral lung fields. Given 5 nitroglycerin sprays, brought bp to 130s/80s.     Leukocytosis  Acute on chronic hypercarbic respiratory failure  COPD exacerbation  Acute on chronic combined systolic and diastolic CHF  DM with hyperglycemia  CKD stage 3  Chronic A fib    Suggest:   ·	monitor off antibiotics   ·	trend WBC daily  ·	Would suggest hem/onc consult to look for other causes of elevated WBC  ·	Pulmonary evaluation appreciated   ·	Check HbA1c  ·	Continue current insulin regimen, monitor to make further adjustment   ·	Cr stable at baseline and avoid nephrotoxins   ·	Rate controlled, on BB  ·	Continue Xarelto ( renally dosed)    Discussed with Dr. Chacho Guadalupe, DO  Infectious Disease Attending  Pager [phone]  After 5pm/weekends please call [phone] for all inquiries and new consults

## 2021-07-11 NOTE — CONSULT NOTE ADULT - SUBJECTIVE AND OBJECTIVE BOX
JO VARELA  MRN-[id number]        Patient is a 75y old  Male who presents with a chief complaint of     HPI:  75M hx DM, HTN, HLD, Obesity, COPD (on home o2 2L), CHF, CAD s/p quadruple bypass with PPM/AICD, presenting with difficulty breathing x 2 days with acute worsening this afternoon. EMS called, pt found to be in resp distress; pt placed on cpap and brought to ED. Found to be hypertensive in field to 180s / 90s with rales in bilateral lung fields. Given 5 nitroglycerin sprays, brought bp to 130s/80s.      (11 Jul 2021 03:00)    agree with above. of note patient has had a leukocytosis with around 15 baseline for the last 2 years but denies any workup.   He is on inhaled steroids but denies PO steroids other then the occasional round when has an acute exacerbation. Now he denies any fevers or chills, just that his SOB progressively got worse   ID consulted for workup and antibiotic management     PAST MEDICAL & SURGICAL HISTORY:  HTN (hypertension)    DM (diabetes mellitus), type 2    High cholesterol    CHF (congestive heart failure)    Pneumonia    COPD (chronic obstructive pulmonary disease)    Pacemaker    AICD (automatic cardioverter/defibrillator) present    S/P CABG x 3  cabg3  in 2003    S/P cardiac pacemaker procedure  defibrilator 2012    S/P cardiac cath  cardiac stentin 2011    S/P hernia repair  hernia [id number]        Allergies  No Known Allergies        ANTIMICROBIALS:      MEDICATIONS  (STANDING):  azithromycin  IVPB   255 mL/Hr IV Intermittent (07-11-21 @ 00:19)    cefTRIAXone   IVPB   100 mL/Hr IV Intermittent (07-10-21 @ 23:40)        OTHER MEDS: MEDICATIONS  (STANDING):  albuterol/ipratropium for Nebulization 3 every 6 hours  allopurinol 100 daily  aspirin enteric coated 81 daily  dextrose 40% Gel 15 once  dextrose 50% Injectable 25 once  dextrose 50% Injectable 12.5 once  dextrose 50% Injectable 25 once  furosemide   Injectable 40 two times a day  glucagon  Injectable 1 once  insulin glargine Injectable (LANTUS) 10 at bedtime  insulin lispro (ADMELOG) corrective regimen sliding scale  three times a day before meals  insulin lispro Injectable (ADMELOG) 5 three times a day before meals  methylPREDNISolone sodium succinate Injectable 40 every 8 hours  metoprolol succinate ER 75 daily  simvastatin 40 at bedtime  tamsulosin 0.4 at bedtime      SOCIAL HISTORY:     former smoker     FAMILY HISTORY:  FH of DM in both parents       REVIEW OF SYSTEMS  [  ] ROS unobtainable because:    [  X] All other systems negative except as noted below:	    Constitutional:  [ ] fever [ ] chills  [ ] weight loss  [ ] weakness  Skin:  [ ] rash [ ] phlebitis	  Eyes: [ ] icterus [ ] pain  [ ] discharge	  ENMT: [ ] sore throat  [ ] thrush [ ] ulcers [ ] exudates  Respiratory: [ ] dyspnea [ ] hemoptysis [X] cough [ X] sputum	  Cardiovascular:  [ ] chest pain [ ] palpitations [ ] edema	  Gastrointestinal:  [ ] nausea [ ] vomiting [ ] diarrhea [ ] constipation [ ] pain	  Genitourinary:  [ ] dysuria [ ] frequency [ ] hematuria [ ] discharge [ ] flank pain  [ ] incontinence  Musculoskeletal:  [ ] myalgias [ ] arthralgias [ ] arthritis  [ ] back pain  Neurological:  [ ] headache [ ] seizures  [ ] confusion/altered mental status  Psychiatric:  [ ] anxiety [ ] depression	  Hematology/Lymphatics:  [ ] lymphadenopathy  Endocrine:  [ ] adrenal [ ] thyroid  Allergic/Immunologic:	 [ ] transplant [ ] seasonal    Vital Signs Last 24 Hrs  T(F): 97.5 (07-11-21 @ 15:39), Max: 98.2 (07-11-21 @ 03:48)    Vital Signs Last 24 Hrs  HR: 80 (07-11-21 @ 17:15) (69 - 84)  BP: 120/69 (07-11-21 @ 15:39) (118/73 - 146/90)  RR: 19 (07-11-21 @ 15:39)  SpO2: 95% (07-11-21 @ 17:15) (93% - 100%)  Wt(kg): --    PHYSICAL EXAM:  Constitutional: non-toxic, no distress, on Bipap able to speak quite well through the mask  HEAD/EYES: anicteric, no conjunctival injection  ENT:  supple, no thrush  Cardiovascular:   normal S1, S2, no murmur, no edema, +cardiac device palpated  Respiratory:  diminished BS bilaterally, no wheezes, no rales  GI:  soft, non-tender, normal bowel sounds, obese and mildly distended   :  no ro, no CVA tenderness  Musculoskeletal:  no synovitis, normal ROM  Neurologic: awake and alert, normal strength, no focal findings  Skin:  no rash, no erythema, no phlebitis, s/p CABG, old graft harvest site on LLE   Heme/Onc: no lymphadenopathy   Psychiatric:  awake, alert, appropriate mood          WBC Count: 15.55 K/uL (07-10 @ 23:11)                            13.5   15.55 )-----------( 151      ( 10 Jul 2021 23:11 )             44.2       07-11    138  |  104  |  38<H>  ----------------------------<  441<H>  4.8   |  25  |  2.00<H>    Ca    8.1<L>      11 Jul 2021 00:50    TPro  7.1  /  Alb  3.5  /  TBili  1.0  /  DBili  x   /  AST  89<H>  /  ALT  83<H>  /  AlkPhos  95  07-11      Creatinine Trend: 2.00<--        MICROBIOLOGY:    Rapid RVP Result: NotDetec (07-11 @ 02:51)    RADIOLOGY:      < from: US Abdomen Complete (US Abdomen Complete .) (07.11.21 @ 09:24) >  IMPRESSION:  Mild nonspecific gallbladder wall thickening..    < from: Xray Chest 1 View- PORTABLE-Urgent (Xray Chest 1 View- PORTABLE-Urgent .) (07.11.21 @ 03:36) >  IMPRESSION: Mild CHF unchanged. Stable cardiac findings.    < end of copied text >

## 2021-07-11 NOTE — PATIENT PROFILE ADULT - NSPROGENPREVTRANSF_GEN_A_NUR
Diabetes Mellitus and Standards of Medical Care  Managing diabetes (diabetes mellitus) can be complicated. Your diabetes treatment may be managed by a team of health care providers, including:  · A physician who specializes in diabetes (endocrinologist).  · A nurse practitioner or physician assistant.  · Nurses.  · A diet and nutrition specialist (registered dietitian).  · A certified diabetes educator (CDE).  · An exercise specialist.  · A pharmacist.  · An eye doctor.  · A foot specialist (podiatrist).  · A dentist.  · A primary care provider.  · A mental health provider.  Your health care providers follow guidelines to help you get the best quality of care. The following schedule is a general guideline for your diabetes management plan. Your health care providers may give you more specific instructions.  Physical exams  Upon being diagnosed with diabetes mellitus, and each year after that, your health care provider will ask about your medical and family history. He or she will also do a physical exam. Your exam may include:  · Measuring your height, weight, and body mass index (BMI).  · Checking your blood pressure. This will be done at every routine medical visit. Your target blood pressure may vary depending on your medical conditions, your age, and other factors.  · Thyroid gland exam.  · Skin exam.  · Screening for damage to your nerves (peripheral neuropathy). This may include checking the pulse in your legs and feet and checking the level of sensation in your hands and feet.  · A complete foot exam to inspect the structure and skin of your feet, including checking for cuts, bruises, redness, blisters, sores, or other problems.  · Screening for blood vessel (vascular) problems, which may include checking the pulse in your legs and feet and checking your temperature.  Blood tests  Depending on your treatment plan and your personal needs, you may have the following tests done:  · HbA1c (hemoglobin A1c). This  test provides information about blood sugar (glucose) control over the previous 2-3 months. It is used to adjust your treatment plan, if needed. This test will be done:  ? At least 2 times a year, if you are meeting your treatment goals.  ? 4 times a year, if you are not meeting your treatment goals or if treatment goals have changed.  · Lipid testing, including total, LDL, and HDL cholesterol and triglyceride levels.  ? The goal for LDL is less than 100 mg/dL (5.5 mmol/L). If you are at high risk for complications, the goal is less than 70 mg/dL (3.9 mmol/L).  ? The goal for HDL is 40 mg/dL (2.2 mmol/L) or higher for men and 50 mg/dL(2.8 mmol/L) or higher for women. An HDL cholesterol of 60 mg/dL (3.3 mmol/L) or higher gives some protection against heart disease.  ? The goal for triglycerides is less than 150 mg/dL (8.3 mmol/L).  · Liver function tests.  · Kidney function tests.  · Thyroid function tests.  Dental and eye exams  · Visit your dentist two times a year.  · If you have type 1 diabetes, your health care provider may recommend an eye exam 3-5 years after you are diagnosed, and then once a year after your first exam.  ? For children with type 1 diabetes, a health care provider may recommend an eye exam when your child is age 10 or older and has had diabetes for 3-5 years. After the first exam, your child should get an eye exam once a year.  · If you have type 2 diabetes, your health care provider may recommend an eye exam as soon as you are diagnosed, and then once a year after your first exam.  Immunizations    · The yearly flu (influenza) vaccine is recommended for everyone 6 months or older who has diabetes.  · The pneumonia (pneumococcal) vaccine is recommended for everyone 2 years or older who has diabetes. If you are 65 or older, you may get the pneumonia vaccine as a series of two separate shots.  · The hepatitis B vaccine is recommended for adults shortly after being diagnosed with  diabetes.  · Adults and children with diabetes should receive all other vaccines according to age-specific recommendations from the Centers for Disease Control and Prevention (CDC).  Mental and emotional health  Screening for symptoms of eating disorders, anxiety, and depression is recommended at the time of diagnosis and afterward as needed. If your screening shows that you have symptoms (positive screening result), you may need more evaluation and you may work with a mental health care provider.  Treatment plan  Your treatment plan will be reviewed at every medical visit. You and your health care provider will discuss:  · How you are taking your medicines, including insulin.  · Any side effects you are experiencing.  · Your blood glucose target goals.  · The frequency of your blood glucose monitoring.  · Lifestyle habits, such as activity level as well as tobacco, alcohol, and substance use.  Diabetes self-management education  Your health care provider will assess how well you are monitoring your blood glucose levels and whether you are taking your insulin correctly. He or she may refer you to:  · A certified diabetes educator to manage your diabetes throughout your life, starting at diagnosis.  · A registered dietitian who can create or review your personal nutrition plan.  · An exercise specialist who can discuss your activity level and exercise plan.  Summary  · Managing diabetes (diabetes mellitus) can be complicated. Your diabetes treatment may be managed by a team of health care providers.  · Your health care providers follow guidelines in order to help you get the best quality of care.  · Standards of care including having regular physical exams, blood tests, blood pressure monitoring, immunizations, screening tests, and education about how to manage your diabetes.  · Your health care providers may also give you more specific instructions based on your individual health.  This information is not intended  to replace advice given to you by your health care provider. Make sure you discuss any questions you have with your health care provider.  Document Released: 10/15/2010 Document Revised: 06/28/2018 Document Reviewed: 09/15/2017  [employer] Interactive Patient Education © 2019 [employer]    Have labs drawn at end of Sept.    no history of blood product transfusion

## 2021-07-11 NOTE — CONSULT NOTE ADULT - SUBJECTIVE AND OBJECTIVE BOX
HPI:    Patient is a 75y old  Male with CKD3 due to diabetic nephropathy, systolic CM/EF 35-40%, mod AS who was admitted earlier this morning with 2-3 days of progressive MARR. Relates non-compliance with fluid and dietary restrictions.   Remains quite dyspneic at rest while on IV lasix 40mg BID. Hypertension control improved since presentation. He denies difficulty voiding, recent NSAIDs or IV dye.   Known hx of CKD, baseline Cr ~2          PAST MEDICAL & SURGICAL HISTORY:  HTN (hypertension)    DM (diabetes mellitus), type 2    High cholesterol    CHF (congestive heart failure)    Pneumonia    COPD (chronic obstructive pulmonary disease)    Pacemaker    AICD (automatic cardioverter/defibrillator) present    S/P CABG x 3  cabg3  in 2003    S/P cardiac pacemaker procedure  defibrilator 2012    S/P cardiac cath  cardiac stentin 2011    S/P hernia repair  hernia [id number]        Social Hx: not a smoker    FAMILY HISTORY:  No pertinent family history in first degree relatives        Allergies    No Known Allergies            MEDICATIONS  (STANDING):  albuterol/ipratropium for Nebulization 3 milliLiter(s) Nebulizer every 6 hours  allopurinol 100 milliGRAM(s) Oral daily  aspirin enteric coated 81 milliGRAM(s) Oral daily  dextrose 40% Gel 15 Gram(s) Oral once  dextrose 5%. 1000 milliLiter(s) (50 mL/Hr) IV Continuous <Continuous>  dextrose 5%. 1000 milliLiter(s) (100 mL/Hr) IV Continuous <Continuous>  dextrose 50% Injectable 25 Gram(s) IV Push once  dextrose 50% Injectable 12.5 Gram(s) IV Push once  dextrose 50% Injectable 25 Gram(s) IV Push once  furosemide   Injectable 40 milliGRAM(s) IV Push two times a day  glucagon  Injectable 1 milliGRAM(s) IntraMuscular once  insulin glargine Injectable (LANTUS) 10 Unit(s) SubCutaneous at bedtime  insulin lispro (ADMELOG) corrective regimen sliding scale   SubCutaneous three times a day before meals  insulin lispro Injectable (ADMELOG) 5 Unit(s) SubCutaneous three times a day before meals  methylPREDNISolone sodium succinate Injectable 40 milliGRAM(s) IV Push every 8 hours  metoprolol succinate ER 75 milliGRAM(s) Oral daily  simvastatin 40 milliGRAM(s) Oral at bedtime  tamsulosin 0.4 milliGRAM(s) Oral at bedtime    MEDICATIONS  (PRN):      Daily Height in cm: 165.1 (11 Jul 2021 07:15)    Daily     Drug Dosing Weight  Height (cm): 165.1 (11 Jul 2021 07:15)  Weight (kg): 114 (11 Jul 2021 07:15)  BMI (kg/m2): 41.8 (11 Jul 2021 07:15)  BSA (m2): 2.18 (11 Jul 2021 07:15)      REVIEW OF SYSTEMS:    CKD  MARR  Systolic CM        ABG - ( 10 Jul 2021 23:11 )  pH, Arterial: x     pH, Blood: 7.38  /  pCO2: 49    /  pO2: 139   / HCO3: 28    / Base Excess: 2.8   /  SaO2: 99                  I&O's Detail        PHYSICAL EXAM:    GENERAL: dyspneic at rest  HEAD:  Atraumatic, normocephalic   NECK: Supple. Pos increase in JVP  NERVOUS SYSTEM:  Alert & Oriented X3. Motor Strength 5/5 B/L upper and lower extremities; DTRs 2+ intact and symmetric  CHEST/LUNG: crackles  HEART: Regular rate and rhythm. No murmurs, rubs, or gallops  ABDOMEN: Soft, Nontender, Nondistended. POS BS  EXTREMITIES:  mild edema    LABS:  CBC Full  -  ( 10 Jul 2021 23:11 )  WBC Count : 15.55 K/uL  RBC Count : 4.59 M/uL  Hemoglobin : 13.5 g/dL  Hematocrit : 44.2 %  Platelet Count - Automated : 151 K/uL  Mean Cell Volume : 96.3 fl  Mean Cell Hemoglobin : 29.4 pg  Mean Cell Hemoglobin Concentration : 30.5 gm/dL  Auto Neutrophil # : 13.50 K/uL  Auto Lymphocyte # : 0.78 K/uL  Auto Monocyte # : 1.01 K/uL  Auto Eosinophil # : 0.08 K/uL  Auto Basophil # : 0.04 K/uL  Auto Neutrophil % : 86.8 %  Auto Lymphocyte % : 5.0 %  Auto Monocyte % : 6.5 %  Auto Eosinophil % : 0.5 %  Auto Basophil % : 0.3 %    07-11    138  |  104  |  38<H>  ----------------------------<  441<H>  4.8   |  25  |  2.00<H>    Ca    8.1<L>      11 Jul 2021 00:50    TPro  7.1  /  Alb  3.5  /  TBili  1.0  /  DBili  x   /  AST  89<H>  /  ALT  83<H>  /  AlkPhos  95  07-11    CAPILLARY BLOOD GLUCOSE      POCT Blood Glucose.: 308 mg/dL (11 Jul 2021 09:19)    PT/INR - ( 10 Jul 2021 23:36 )   PT: 24.9 sec;   INR: 2.23 ratio         PTT - ( 10 Jul 2021 23:36 )  PTT:28.9 sec    CARDIAC MARKERS ( 10 Jul 2021 23:11 )  .187 ng/mL / x     / x     / x     / x            Impression:  * CKD3. Baseline Cr 2. Diabetic, hypertensive nephropathy  * Decompensated HF. Systolic CM, at least mod AS  * Hypertensive urgency. Improved.     Recommendations:   * Consider increasing lasix to 80mg BID.   * Goal neg fluid balance of 2L per day  * May add Zaroxolyn as needed to augment action of the loop diuretic  * If all fails, consider lasix gtt  * FR 1000cc/day  * monitor Cr daily

## 2021-07-11 NOTE — H&P ADULT - NSHPLABSRESULTS_GEN_ALL_CORE
13.5   15.55 )-----------( 151      ( 10 Jul 2021 23:11 )             44.2     07-11    138  |  104  |  38<H>  ----------------------------<  441<H>  4.8   |  25  |  2.00<H>    Ca    8.1<L>      11 Jul 2021 00:50    TPro  7.1  /  Alb  3.5  /  TBili  1.0  /  DBili  x   /  AST  89<H>  /  ALT  83<H>  /  AlkPhos  95  07-11    PT/INR - ( 10 Jul 2021 23:36 )   PT: 24.9 sec;   INR: 2.23 ratio         PTT - ( 10 Jul 2021 23:36 )  PTT:28.9 sec

## 2021-07-11 NOTE — H&P ADULT - NSICDXPASTSURGICALHX_GEN_ALL_CORE_FT
PAST SURGICAL HISTORY:  S/P CABG x 3 cabg3  in 2003    S/P cardiac cath cardiac stentin 2011    S/P cardiac pacemaker procedure defibrilator 2012    S/P hernia repair hernia [id number]

## 2021-07-11 NOTE — H&P ADULT - HISTORY OF PRESENT ILLNESS
75M hx DM, HTN, HLD, Obesity, COPD (on home o2 2L), CHF, CAD s/p quadruple bypass with PPM/AICD, presenting with difficulty breathing x 2 days with acute worsening this afternoon. EMS called, pt found to be in resp distress; pt placed on cpap and brought to ED. Found to be hypertensive in field to 180s / 90s with rales in bilateral lung fields. Given 5 nitroglycerin sprays, brought bp to 130s/80s.

## 2021-07-11 NOTE — CONSULT NOTE ADULT - SUBJECTIVE AND OBJECTIVE BOX
Patient is a 75y old  Male who presents with a chief complaint of SOB    HPI: 76yo, BM with HTN, HLD, DM, CAD with CABG+ stents, Diastolic CHF, S/P AICD, COPD (on home o2 2L), Obesity, EMBER on CPAP( reports compliance),  Gout.  Smoked close to 40 years, quit 20 years ago.  Presented  with difficulty breathing x 2 days with acute worsening the day of presentation   EMS  found to be in Respiratory  distress, placed on CPAP and brought to ED.   Found to be hypertensive in field to 180s / 90s with rales in bilateral lung fields.   Given 5 nitroglycerin sprays, which brought bp to 130s/80s.   Admitted with acute on chronic hypoxic hypercarbic Respiratory failure and hypertensive urgency.    PAST MEDICAL & SURGICAL HISTORY:  HTN (hypertension)    DM (diabetes mellitus), type 2    High cholesterol    CHF (congestive heart failure)    Pneumonia    COPD (chronic obstructive pulmonary disease)    Pacemaker    AICD (automatic cardioverter/defibrillator) present    S/P CABG x 3  cabg3  in     S/P cardiac pacemaker procedure  defibrilator     S/P cardiac cath  cardiac stentin     S/P hernia repair  hernia [id number]    FAMILY HISTORY:  No pertinent family history in first degree relatives    SOCIAL HISTORY: BMI (kg/m2): 41.8 (11 @ 07:15). ex smoker    Allergies  No Known Allergies    MEDICATIONS  (STANDING):  albuterol/ipratropium for Nebulization 3 milliLiter(s) Nebulizer every 6 hours  allopurinol 100 milliGRAM(s) Oral daily  aspirin enteric coated 81 milliGRAM(s) Oral daily  dextrose 40% Gel 15 Gram(s) Oral once  dextrose 5%. 1000 milliLiter(s) (50 mL/Hr) IV Continuous <Continuous>  dextrose 5%. 1000 milliLiter(s) (100 mL/Hr) IV Continuous <Continuous>  dextrose 50% Injectable 25 Gram(s) IV Push once  dextrose 50% Injectable 12.5 Gram(s) IV Push once  dextrose 50% Injectable 25 Gram(s) IV Push once  furosemide   Injectable 40 milliGRAM(s) IV Push two times a day  glucagon  Injectable 1 milliGRAM(s) IntraMuscular once  insulin glargine Injectable (LANTUS) 10 Unit(s) SubCutaneous at bedtime  insulin lispro (ADMELOG) corrective regimen sliding scale   SubCutaneous three times a day before meals  insulin lispro Injectable (ADMELOG) 5 Unit(s) SubCutaneous three times a day before meals  methylPREDNISolone sodium succinate Injectable 40 milliGRAM(s) IV Push every 8 hours  metoprolol succinate ER 75 milliGRAM(s) Oral daily  simvastatin 40 milliGRAM(s) Oral at bedtime  tamsulosin 0.4 milliGRAM(s) Oral at bedtime    REVIEW OF SYSTEMS:    Constitutional:            No fever, weight loss or fatigue  HEENT:                         No difficulty hearing, tinnitus, vertigo; No sinus or throat pain  Respiratory:                  SOB  Cardiovascular:           No chest pain, palpitations  Gastrointestinal:        No abdominal or epigastric pain. No N/V/diarrhea or hematemesis  SKIN:                             no rash  Musculoskeletal:        No joint pain or swelling  Extremities:                No swelling  Neurological:              No headaches  Psychiatric:                 No depression, anxiety    MACRA & MIPS:  Vaccines - Influenza: yes and Pneumovax: yes  Tobacco: ex smoker  Blood Pressure Screening / Control of: 130/74  Current Medications Reviewed: yes    Vital Signs Last 24 Hrs  T(C): 36.4 (2021 10:30), Max: 36.8 (2021 03:48)  T(F): 97.5 (2021 10:30), Max: 98.2 (2021 03:48)  HR: 70 (2021 12:09) (69 - 84)  BP: 130/74 (2021 10:30) (118/73 - 146/90)  BP(mean): --  RR: 19 (2021 10:30) (17 - 28)  SpO2: 99% (2021 12:09) (93% - 100%)    PHYSICAL EXAM:  GEN:         Awake, responsive and comfortable.  HEENT:     BIPAP  RESP:       no wheezing  CVS:          Regular rate and rhythm.   ABD:         Soft, non-tender, non-distended;   SKIN:           Warm and dry.  EXTR:            No clubbing, cyanosis or edema  CNS:              Intact sensory and motor function.  PSYCH:        cooperative, no anxiety or depression    LABS:                        13.5   15.55 )-----------( 151      ( 10 Jul 2021 23:11 )             44.2     07-11    138  |  104  |  38<H>  ----------------------------<  441<H>  4.8   |  25  |  2.00<H>    Ca    8.1<L>      2021 00:50    TPro  7.1  /  Alb  3.5  /  TBili  1.0  /  DBili  x   /  AST  89<H>  /  ALT  83<H>  /  AlkPhos  95      PT/INR - ( 10 Jul 2021 23:36 )   PT: 24.9 sec;   INR: 2.23 ratio      PTT - ( 10 Jul 2021 23:36 )  PTT:28.9 sec  07-10 @ 23:11  pH: 7.38  pCO2: 49  pO2: 139  SaO2: 99    EKG: Pacer rhythm    RADIOLOGY & ADDITIONAL STUDIES:  < from: US Abdomen Complete (US Abdomen Complete .) (21 @ 09:24) >  EXAM:  US ABDOMEN COMPLETE                          PROCEDURE DATE:  2021      INTERPRETATION:  CLINICAL INFORMATION: Elevated LFTs    COMPARISON: None available.    TECHNIQUE: Sonography of the abdomen.    FINDINGS: Studies limited dueto patient body habitus.    Liver: Within normal limits.  Bile ducts: Normal caliber. Common bile duct measures 4 mm.  Gallbladder: Mild wall thickening up to 4 mm. No cholelithiasis or pericholecystic fluid. Sonographic Frye sign is negative.  Pancreas: Visualized portions are within normal limits.  Spleen: 11.3 cm. Within normal limits.  Right kidney: 11.4 cm. No hydronephrosis.  Left kidney: 11.5 cm.  No hydronephrosis.  Ascites: None.  Aorta and IVC: Visualized portions are within normal limits.    IMPRESSION:  Mild nonspecific gallbladder wall thickening..    FAHAD FERRO MD; Attending Radiologist  This document has been electronically signed. 2021 11:52AM  < from: TTE Echo Complete w/o Contrast w/ Doppler (21 @ 14:40) >   EXAM:  ECHO TTE WO CON COMP W DOPP      PROCEDURE DATE:  2021      INTERPRETATION:  REPORT:  TRANSTHORACIC ECHOCARDIOGRAM REPORT    Patient Name:   JO VARELA Patient Location: [facility] Rec #:  [id number]    Accession #:      [id number]  Account #:                    Height:           69.7 in 177.0 cm  YOB: 1945     Weight:           244.7 lb 111.00 kg  Patient Age:    75 years      BSA:              2.27 m²  Patient Gender: M             BP:               126/76 mmHg    Date of Exam:        3/14/2021 2:40:18 PM  Sonographer:         KIRK  Referring Physician: MARNI    Procedure:     2D Echo/Doppler/Color Doppler Complete.  Indications:   Dyspnea, unspecified - R06.00  Diagnosis:     Dyspnea, unspecified - R06.00  Study Details: Technically difficult study.    2D AND M-MODE MEASUREMENTS (normal ranges within parentheses):  Left Ventricle:                  Normal   Aorta/Left Atrium:          Normal  IVSd (2D):              1.28 cm (0.7-1.1) AorticRoot (2D):  3.75 cm (2.4-3.7)  LVPWd (2D):             1.29 cm (0.7-1.1) Left Atrium (2D):  4.50 cm (1.9-4.0)  LVIDd (2D):             5.37 cm (3.4-5.7) Right Ventricle:  LVIDs (2D):             4.20 cm           TAPSE:           1.12 cm  LV FS (2D):            21.8 %   (>25%)  Relative Wall Thickness  0.48    (<0.42)    LV DIASTOLIC FUNCTION:  MV Peak E: 1.04 m/s Decel Time:  308 msec  Septal e'  0.0 m/s  Septal E/e'  21.6  Lateral e' 0.1 m/s  Lateral E/e' 14.7    SPECTRAL DOPPLER ANALYSIS (where applicable):  Mitral Valve:  MV P1/2 Time: 89.34 msec  MV Area, PHT: 2.46 cm²    Aortic Valve: AoV Max Raphael: 2.28 m/s AoV Peak P.8 mmHg AoV Mean PG: 10.2 mmHg    LVOT Vmax: 0.66 m/s LVOT VTI: 0.144 m LVOT Diameter: 2.05 cm    AoV Area, Vmax: 0.95 cm² AoV Area, VTI: 1.17 cm² AoV Area, Vmn: 1.16 cm²    Aortic Insufficiency:  AI Half-time:  321 msec  AI Decel Rate: 2.97 m/s²    Tricuspid Valve and PA/RV Systolic Pressure: TR Max Velocity: 2.95 m/s RA Pressure: 8 mmHg RVSP/PASP: 42.7 mmHg    Pulmonic Valve:  PV Max Velocity: 0.68 m/s PV Max P.9 mmHg PV Mean PG:    PHYSICIAN INTERPRETATION:  Left Ventricle: The left ventricular internal cavity size is normal.  Global LV systolic function was moderately decreased. Left ventricular ejection fraction, by visual estimation, is 35 to 40%. Spectral Doppler shows pseudonormal pattern of left ventricular myocardial filling (Grade II diastolic dysfunction). Elevated left ventricular end-diastolic pressure.      LV Wall Scoring:  The mid andapical anterior wall and mid anteroseptal segment are hypokinetic.    Right Ventricle: Normal right ventricular size and function.  Left Atrium: Mildly enlarged left atrium.  Right Atrium: Mildly enlarged right atrium.  Pericardium: There is no evidence of pericardial effusion.  Mitral Valve: Mild thickening and calcification of the anterior and posterior mitral valve leaflets. Mitral leaflet mobility is normal. Trace mitral valve regurgitation is seen.  Tricuspid Valve: The tricuspid valve is not well visualized. Trivial tricuspid regurgitation is visualized. Estimated pulmonary artery systolic pressure is 42.7 mmHg assuming a right atrial pressure of 8 mmHg, which is consistent with mild pulmonary hypertension.  Aortic Valve: The aortic valve was not well visualized. Moderate aortic stenosis is present. Peak transaortic gradient equals 20.8 mmHg, mean transaortic gradient equals 10.2 mmHg, the calculated aortic valve area equals 1.17 cm² by the continuity equation consistent with moderate aortic stenosis. Moderate aortic valve regurgitation is seen.  Pulmonic Valve: The pulmonic valve was not well visualized. Mild pulmonic valve regurgitation.  Aorta: Aortic root measured at Sinus of Valsalva is normal.    Summary:   1. Left ventricular ejection fraction, by visual estimation, is 35 to 40%.   2. Technically difficult study.   3. Moderately decreased global left ventricular systolic function.   4. Mid and apical anterior wall and mid anteroseptal segment are abnormal as described above.   5. Elevated left ventricular end-diastolic pressure.   6. Normal left ventricular internal cavity size.   7. Spectral Doppler shows pseudonormal pattern of left ventricular myocardial filling (Grade II diastolic dysfunction).   8. Normal right ventricular size and function.   9. Mildly enlarged left atrium.  10. Mildly enlarged right atrium.  11. There is no evidence of pericardial effusion.  12. Mild thickening and calcification of the anterior and posterior mitral valve leaflets.  13. Trace mitral valve regurgitation.  14. Moderate aortic regurgitation.  15. Moderate aortic valve stenosis.  16. Mild pulmonic valve regurgitation.  17. Estimated pulmonary artery systolic pressure is 42.7 mmHg assuming a right atrial pressure of 8 mmHg, which is consistent with mild pulmonary hypertension.  18. Consider limited Definity echocontrast follow up study to better evaluate left ventricular function.  19. Peak transaortic gradient equals 20.8 mmHg, mean transaortic gradient equals 10.2mmHg, the calculated aortic valve area equals 1.17 cm² by the continuity equation consistent with moderate aortic stenosis.    Elizabeth Phipps MD FACC, FASE, FACP  Electronically signed on 3/15/2021 at 12:12:05 PM    ELIZABETH PHIPPS   This document has been electronically signed. Mar 14 2021  2:40PM    ASSESSMENT AND PLAN:  ·	Acute on chronic hypoxic Respiratory failure.  ·	Acute on chronic systolic + diastolic CHF.  ·	Aortic Regurgitation+ Stenosis.  ·	Acute COPD exacerbation.  ·	Leukocytosis.  ·	Renal Insuffiencey.  ·	CAD with CABG+ stents.  ·	S/P AICD.  ·	Obesity.  ·	EMBER.  ·	HTN.  ·	HLD.  ·	DM.  ·	Gout.    Supplemental O2 with PRN  BIPAP.  Continue steroids, diuretics. and nebulizer.

## 2021-07-12 LAB
A1C WITH ESTIMATED AVERAGE GLUCOSE RESULT: 8.6 % — HIGH (ref 4–5.6)
ALBUMIN SERPL ELPH-MCNC: 3.3 G/DL — SIGNIFICANT CHANGE UP (ref 3.3–5)
ALP SERPL-CCNC: 106 U/L — SIGNIFICANT CHANGE UP (ref 40–120)
ALT FLD-CCNC: 72 U/L — SIGNIFICANT CHANGE UP (ref 12–78)
ANION GAP SERPL CALC-SCNC: 9 MMOL/L — SIGNIFICANT CHANGE UP (ref 5–17)
AST SERPL-CCNC: 55 U/L — HIGH (ref 15–37)
BASE EXCESS BLDA CALC-SCNC: 3.9 MMOL/L — HIGH (ref -2–2)
BILIRUB SERPL-MCNC: 0.8 MG/DL — SIGNIFICANT CHANGE UP (ref 0.2–1.2)
BLOOD GAS COMMENTS: SIGNIFICANT CHANGE UP
BLOOD GAS SOURCE: SIGNIFICANT CHANGE UP
BUN SERPL-MCNC: 58 MG/DL — HIGH (ref 7–23)
CALCIUM SERPL-MCNC: 8.6 MG/DL — SIGNIFICANT CHANGE UP (ref 8.5–10.1)
CHLORIDE SERPL-SCNC: 104 MMOL/L — SIGNIFICANT CHANGE UP (ref 96–108)
CO2 SERPL-SCNC: 28 MMOL/L — SIGNIFICANT CHANGE UP (ref 22–31)
COVID-19 SPIKE DOMAIN AB INTERP: POSITIVE
COVID-19 SPIKE DOMAIN ANTIBODY RESULT: >250 U/ML — HIGH
CREAT SERPL-MCNC: 2.1 MG/DL — HIGH (ref 0.5–1.3)
ESTIMATED AVERAGE GLUCOSE: 200 MG/DL — HIGH (ref 68–114)
GLUCOSE BLDC GLUCOMTR-MCNC: 338 MG/DL — HIGH (ref 70–99)
GLUCOSE BLDC GLUCOMTR-MCNC: 346 MG/DL — HIGH (ref 70–99)
GLUCOSE BLDC GLUCOMTR-MCNC: 368 MG/DL — HIGH (ref 70–99)
GLUCOSE BLDC GLUCOMTR-MCNC: 374 MG/DL — HIGH (ref 70–99)
GLUCOSE SERPL-MCNC: 352 MG/DL — HIGH (ref 70–99)
HCO3 BLDA-SCNC: 29 MMOL/L — SIGNIFICANT CHANGE UP (ref 21–29)
HCT VFR BLD CALC: 43.6 % — SIGNIFICANT CHANGE UP (ref 39–50)
HGB BLD-MCNC: 13.3 G/DL — SIGNIFICANT CHANGE UP (ref 13–17)
HOROWITZ INDEX BLDA+IHG-RTO: 32 — SIGNIFICANT CHANGE UP
MCHC RBC-ENTMCNC: 29.4 PG — SIGNIFICANT CHANGE UP (ref 27–34)
MCHC RBC-ENTMCNC: 30.5 GM/DL — LOW (ref 32–36)
MCV RBC AUTO: 96.2 FL — SIGNIFICANT CHANGE UP (ref 80–100)
NRBC # BLD: 0 /100 WBCS — SIGNIFICANT CHANGE UP (ref 0–0)
PCO2 BLDA: 50 MMHG — HIGH (ref 32–46)
PH BLD: 7.39 — SIGNIFICANT CHANGE UP (ref 7.35–7.45)
PLATELET # BLD AUTO: 135 K/UL — LOW (ref 150–400)
PO2 BLDA: 96 MMHG — SIGNIFICANT CHANGE UP (ref 74–108)
POTASSIUM SERPL-MCNC: 4.5 MMOL/L — SIGNIFICANT CHANGE UP (ref 3.5–5.3)
POTASSIUM SERPL-SCNC: 4.5 MMOL/L — SIGNIFICANT CHANGE UP (ref 3.5–5.3)
PROT SERPL-MCNC: 6.8 GM/DL — SIGNIFICANT CHANGE UP (ref 6–8.3)
RBC # BLD: 4.53 M/UL — SIGNIFICANT CHANGE UP (ref 4.2–5.8)
RBC # FLD: 16.8 % — HIGH (ref 10.3–14.5)
SAO2 % BLDA: 85 % — LOW (ref 92–96)
SARS-COV-2 IGG+IGM SERPL QL IA: >250 U/ML — HIGH
SARS-COV-2 IGG+IGM SERPL QL IA: POSITIVE
SODIUM SERPL-SCNC: 141 MMOL/L — SIGNIFICANT CHANGE UP (ref 135–145)
TROPONIN I SERPL-MCNC: 0.15 NG/ML — HIGH (ref 0.01–0.04)
WBC # BLD: 16.72 K/UL — HIGH (ref 3.8–10.5)
WBC # FLD AUTO: 16.72 K/UL — HIGH (ref 3.8–10.5)

## 2021-07-12 PROCEDURE — 99223 1ST HOSP IP/OBS HIGH 75: CPT

## 2021-07-12 RX ORDER — ISOSORBIDE DINITRATE 5 MG/1
10 TABLET ORAL THREE TIMES A DAY
Refills: 0 | Status: DISCONTINUED | OUTPATIENT
Start: 2021-07-12 | End: 2021-07-15

## 2021-07-12 RX ORDER — HYDRALAZINE HCL 50 MG
10 TABLET ORAL THREE TIMES A DAY
Refills: 0 | Status: DISCONTINUED | OUTPATIENT
Start: 2021-07-12 | End: 2021-07-15

## 2021-07-12 RX ADMIN — Medication 75 MILLIGRAM(S): at 05:31

## 2021-07-12 RX ADMIN — Medication 100 MILLIGRAM(S): at 12:02

## 2021-07-12 RX ADMIN — Medication 5 UNIT(S): at 12:02

## 2021-07-12 RX ADMIN — Medication 5: at 12:01

## 2021-07-12 RX ADMIN — Medication 40 MILLIGRAM(S): at 17:18

## 2021-07-12 RX ADMIN — SIMVASTATIN 40 MILLIGRAM(S): 20 TABLET, FILM COATED ORAL at 22:10

## 2021-07-12 RX ADMIN — RIVAROXABAN 15 MILLIGRAM(S): KIT at 16:37

## 2021-07-12 RX ADMIN — INSULIN GLARGINE 10 UNIT(S): 100 INJECTION, SOLUTION SUBCUTANEOUS at 22:12

## 2021-07-12 RX ADMIN — Medication 5: at 16:37

## 2021-07-12 RX ADMIN — Medication 40 MILLIGRAM(S): at 13:31

## 2021-07-12 RX ADMIN — Medication 3 MILLILITER(S): at 05:09

## 2021-07-12 RX ADMIN — Medication 40 MILLIGRAM(S): at 22:10

## 2021-07-12 RX ADMIN — Medication 40 MILLIGRAM(S): at 05:29

## 2021-07-12 RX ADMIN — Medication 3 MILLILITER(S): at 00:25

## 2021-07-12 RX ADMIN — Medication 81 MILLIGRAM(S): at 12:02

## 2021-07-12 RX ADMIN — Medication 3 MILLILITER(S): at 17:10

## 2021-07-12 RX ADMIN — Medication 5 UNIT(S): at 08:37

## 2021-07-12 RX ADMIN — TAMSULOSIN HYDROCHLORIDE 0.4 MILLIGRAM(S): 0.4 CAPSULE ORAL at 22:09

## 2021-07-12 RX ADMIN — Medication 4: at 08:34

## 2021-07-12 RX ADMIN — Medication 3 MILLILITER(S): at 11:03

## 2021-07-12 RX ADMIN — Medication 5 UNIT(S): at 16:37

## 2021-07-12 RX ADMIN — Medication 40 MILLIGRAM(S): at 05:27

## 2021-07-12 NOTE — PROGRESS NOTE ADULT - SUBJECTIVE AND OBJECTIVE BOX
INTERVAL HPI:  76yo, BM with HTN, HLD, DM, CAD with CABG+ stents, Diastolic CHF, S/P AICD, COPD (on home o2 2L), Obesity, EMBER on CPAP( reports compliance),  Gout.  Smoked close to 40 years, quit 20 years ago.  Presented  with difficulty breathing x 2 days with acute worsening the day of presentation   EMS  found to be in Respiratory  distress, placed on CPAP and brought to ED.   Found to be hypertensive in field to 180s / 90s with rales in bilateral lung fields.   Given 5 nitroglycerin sprays, which brought bp to 130s/80s.   Admitted with acute on chronic hypoxic hypercarbic Respiratory failure and hypertensive urgency.    OVERNIGHT EVENTS:  Feels a little better.    Vital Signs Last 24 Hrs  T(C): 36.3 (12 Jul 2021 15:30), Max: 36.3 (11 Jul 2021 23:13)  T(F): 97.4 (12 Jul 2021 15:30), Max: 97.4 (11 Jul 2021 23:13)  HR: 70 (12 Jul 2021 17:27) (69 - 80)  BP: 124/67 (12 Jul 2021 15:30) (116/67 - 138/76)  BP(mean): --  RR: 18 (12 Jul 2021 15:30) (17 - 18)  SpO2: 96% (12 Jul 2021 17:27) (91% - 97%)    PHYSICAL EXAM:  GEN:         Awake, responsive and comfortable.  HEENT:    Normal.    RESP:        no wheezing.  CVS:             Regular rate and rhythm.   ABD:         Soft, non-tender, non-distended;     MEDICATIONS  (STANDING):  albuterol/ipratropium for Nebulization 3 milliLiter(s) Nebulizer every 6 hours  allopurinol 100 milliGRAM(s) Oral daily  aspirin enteric coated 81 milliGRAM(s) Oral daily  dextrose 40% Gel 15 Gram(s) Oral once  dextrose 5%. 1000 milliLiter(s) (50 mL/Hr) IV Continuous <Continuous>  dextrose 5%. 1000 milliLiter(s) (100 mL/Hr) IV Continuous <Continuous>  dextrose 50% Injectable 25 Gram(s) IV Push once  dextrose 50% Injectable 12.5 Gram(s) IV Push once  dextrose 50% Injectable 25 Gram(s) IV Push once  furosemide   Injectable 40 milliGRAM(s) IV Push two times a day  glucagon  Injectable 1 milliGRAM(s) IntraMuscular once  insulin glargine Injectable (LANTUS) 10 Unit(s) SubCutaneous at bedtime  insulin lispro (ADMELOG) corrective regimen sliding scale   SubCutaneous three times a day before meals  insulin lispro Injectable (ADMELOG) 5 Unit(s) SubCutaneous three times a day before meals  methylPREDNISolone sodium succinate Injectable 40 milliGRAM(s) IV Push every 8 hours  metoprolol succinate ER 75 milliGRAM(s) Oral daily  rivaroxaban 15 milliGRAM(s) Oral with dinner  simvastatin 40 milliGRAM(s) Oral at bedtime  tamsulosin 0.4 milliGRAM(s) Oral at bedtime    LABS:                        13.3   16.72 )-----------( 135      ( 12 Jul 2021 07:33 )             43.6     07-12    141  |  104  |  58<H>  ----------------------------<  352<H>  4.5   |  28  |  2.10<H>    Ca    8.6      12 Jul 2021 07:33    TPro  6.8  /  Alb  3.3  /  TBili  0.8  /  DBili  x   /  AST  55<H>  /  ALT  72  /  AlkPhos  106  07-12    PT/INR - ( 10 Jul 2021 23:36 )   PT: 24.9 sec;   INR: 2.23 ratio      PTT - ( 10 Jul 2021 23:36 )  PTT:28.9 sec  07-12 @ 06:29  pH: 7.39  pCO2: 50  pO2: 85  SaO2: 96  07-10 @ 23:11  pH: 7.38  pCO2: 49  pO2: 139  SaO2: 99    ASSESSMENT AND PLAN:  ·	Acute on chronic hypoxic Respiratory failure.  ·	Acute on chronic systolic + diastolic CHF.  ·	Aortic Regurgitation+ Stenosis.  ·	Acute COPD exacerbation.  ·	Leukocytosis.  ·	Renal Insuffiencey.  ·	CAD with CABG+ stents.  ·	S/P AICD.  ·	Obesity.  ·	EMBER.  ·	HTN.  ·	HLD.  ·	DM.  ·	Gout.    Clinically improving.  Continue O2, nocturnal BIPAP.  On steroids, diuretics and nebulizer.

## 2021-07-12 NOTE — CONSULT NOTE ADULT - SUBJECTIVE AND OBJECTIVE BOX
CARDIOLOGY CONSULTATION NOTE                                                                               JO VARELA is a 75y Male with known hx DM, HTN, HLD, Obesity, COPD (on home o2 2L), CHF, CAD s/p quadruple bypass with PPM/AICD, presenting with difficulty breathing x 2 days with acute worsening this afternoon. EMS called, pt found to be in resp distress; pt placed on cpap and brought to ED. Found to be hypertensive in field to 180s / 90s with rales in bilateral lung fields. Given 5 nitroglycerin sprays, brought bp to 130s/80s.      (2021 03:00)      REVIEW OF SYSTEMS:  -----------------------------    CONSTITUTIONAL: No fever, weight loss, or fatigue  EYES: No eye pain, visual disturbances, or discharge  ENMT:  No difficulty hearing, tinnitus, vertigo; No sinus or throat pain  NECK: No pain or stiffness  BREASTS: No pain, masses, or nipple discharge  RESPIRATORY: No cough, wheezing, chills or hemoptysis; No shortness of breath  CARDIOVASCULAR: See HPI  GASTROINTESTINAL: No abdominal or epigastric pain. No nausea, vomiting, or hematemesis; No diarrhea or constipation. No melena or hematochezia.  GENITOURINARY: No dysuria, frequency, hematuria, or incontinence  NEUROLOGICAL: No headaches, memory loss, loss of strength, numbness, or tremors  SKIN: No itching, burning, rashes, or lesions   LYMPH NODES: No enlarged glands  ENDOCRINE: No heat or cold intolerance; No hair loss  MUSCULOSKELETAL: No joint pain or swelling; No muscle, back, or extremity pain  PSYCHIATRIC: No depression, anxiety, mood swings, or difficulty sleeping  HEME/LYMPH: No easy bruising, or bleeding gums  ALLERGY AND IMMUNOLOGIC: No hives or eczema    Home Medications:  allopurinol 100 mg oral tablet: 2 tab(s) orally once a day (13 Mar 2021 15:08)  aspirin 81 mg oral tablet: 1 tab(s) orally once a day (13 Mar 2021 15:08)  folic acid 1 mg oral tablet: 1 tab(s) orally once a day (13 Mar 2021 15:08)  Januvia 50 mg oral tablet: 1 tab(s) orally once a day (13 Mar 2021 15:08)  Lasix 40 mg oral tablet: 1 tab(s) orally once a day (13 Mar 2021 15:08)  Metoprolol Succinate ER 50 mg oral tablet, extended release: 1.5 tab(s) orally once a day (13 Mar 2021 15:08)  nateglinide 120 mg oral tablet: 1 tab(s) orally 3 times a day (before meals) (13 Mar 2021 15:08)  tamsulosin 0.4 mg oral capsule: 1 cap(s) orally once a day (13 Mar 2021 15:08)  Toujeo SoloStar 300 units/mL subcutaneous solution: 53  subcutaneous once a day (at bedtime) (13 Mar 2021 15:08)  Trulicity Pen 1.5 mg/0.5 mL subcutaneous solution: once a week (13 Mar 2021 15:08)  Xarelto 20 mg oral tablet: 1 tab(s) orally once a day (in the evening) (13 Mar 2021 15:08)  Zocor 40 mg oral tablet: 1 tab(s) orally once a day (at bedtime) (13 Mar 2021 15:08)      MEDICATIONS  (STANDING):  albuterol/ipratropium for Nebulization 3 milliLiter(s) Nebulizer every 6 hours  allopurinol 100 milliGRAM(s) Oral daily  aspirin enteric coated 81 milliGRAM(s) Oral daily  furosemide   Injectable 40 milliGRAM(s) IV Push two times a day  glucagon  Injectable 1 milliGRAM(s) IntraMuscular once  insulin glargine Injectable (LANTUS) 10 Unit(s) SubCutaneous at bedtime  insulin lispro (ADMELOG) corrective regimen sliding scale   SubCutaneous three times a day before meals  insulin lispro Injectable (ADMELOG) 5 Unit(s) SubCutaneous three times a day before meals  methylPREDNISolone sodium succinate Injectable 40 milliGRAM(s) IV Push every 8 hours  metoprolol succinate ER 75 milliGRAM(s) Oral daily  rivaroxaban 15 milliGRAM(s) Oral with dinner  simvastatin 40 milliGRAM(s) Oral at bedtime  tamsulosin 0.4 milliGRAM(s) Oral at bedtime      ALLERGIES: No Known Allergies      FAMILY HISTORY:  No pertinent family history in first degree relatives        PHYSICAL EXAMINATION:  -----------------------------  T(C): 36.3 (21 @ 05:06), Max: 36.4 (21 @ 10:30)  HR: 77 (21 @ 06:49) (69 - 80)  BP: 116/75 (21 @ 05:06) (116/75 - 138/76)  RR: 18 (21 @ 05:06) (17 - 19)  SpO2: 96% (21 @ 06:49) (91% - 100%)  Wt(kg): --     @ 07:01  -   @ 07:00  --------------------------------------------------------  IN:  Total IN: 0 mL    OUT:    Stool (mL): 1 mL    Voided (mL): 501 mL  Total OUT: 502 mL    Total NET: -502 mL            Constitutional: well developed, normal appearance, well groomed, well nourished, no deformities and no acute distress.   Eyes: the conjunctiva exhibited no abnormalities and the eyelids demonstrated no xanthelasmas.   HEENT: normal oral mucosa, no oral pallor and no oral cyanosis.   Neck: normal jugular venous A waves present, normal jugular venous V waves present and no jugular venous nava A waves.   Pulmonary: no respiratory distress, normal respiratory rhythm and effort, no accessory muscle use and lungs were clear to auscultation bilaterally.   Cardiovascular: heart rate and rhythm were normal, normal S1 and S2 and no murmur, gallop, rub, heave or thrill are present.   Abdomen: soft, non-tender, no hepato-splenomegaly and no abdominal mass palpated.   Musculoskeletal: the gait could not be assessed..   Extremities: no clubbing of the fingernails, no localized cyanosis, no petechial hemorrhages and no ischemic changes.   Skin: normal skin color and pigmentation, no rash, no venous stasis, no skin lesions, no skin ulcer and no xanthoma was observed.   Psychiatric: oriented to person, place, and time, the affect was normal, the mood was normal and not feeling anxious.     ECG:  -------  < from: 12 Lead ECG (07.10.21 @ 22:51) >    Ventricular Rate 80 BPM    Atrial Rate 69 BPM    QRS Duration 198 ms    Q-T Interval 492 ms    QTC Calculation(Bazett) 567 ms    R Axis -36 degrees    T Axis 68 degrees    Diagnosis Line Ventricular-paced rhythm  Biventricular pacemaker detected  Abnormal ECG  When compared with ECG of 13-MAR-2021 14:43,  Vent. rate has increased BY   9 BPM  Confirmed by Jakub Emmanuel MD ([id number]) on 2021 7:40:50 AM    < end of copied text >        LABS:   --------      138  |  104  |  38<H>  ----------------------------<  441<H>  4.8   |  25  |  2.00<H>    Ca    8.1<L>      2021 00:50    TPro  7.1  /  Alb  3.5  /  TBili  1.0  /  DBili  x   /  AST  89<H>  /  ALT  83<H>  /  AlkPhos  95                           13.5   15.55 )-----------( 151      ( 10 Jul 2021 23:11 )             44.2     PT/INR - ( 10 Jul 2021 23:36 )   PT: 24.9 sec;   INR: 2.23 ratio         PTT - ( 10 Jul 2021 23:36 )  PTT:28.9 sec     @ 13:00 CPK total:--, CKMB --, Troponin I - .299 ng/mL<H>  07-10 @ 23:11 CPK total:--, CKMB --, Troponin I - .187 ng/mL<H>          RADIOLOGY REPORTS:  -----------------------------  < from: Xray Chest 1 View- PORTABLE-Urgent (Xray Chest 1 View- PORTABLE-Urgent .) (21 @ 03:36) >    EXAM:  XR CHEST PORTABLE URGENT 1V                            PROCEDURE DATE:  2021          INTERPRETATION:  AP semierect chest on 2021 at 3:20 AM. Patient is short of breath.    Gross heart enlargement, sternotomy, and left-sided defibrillator again noted.    On  of this year there was significant congestive findings.    On has an examination similar findings are seen.    IMPRESSION: Mild CHF unchanged. Stable cardiac findings.    --- End of Report ---            NAA ESQUIVEL; Attending Radiologist  This document has been electronically signed. 2021  3:35PM    < end of copied text >        ECHOCARDIOGRAM:  ---------------------------  < from: TTE Echo Complete w/o Contrast w/ Doppler (21 @ 14:40) >     EXAM:  ECHO TTE WO CON COMP W DOPP         PROCEDURE DATE:  2021        INTERPRETATION:  REPORT:  TRANSTHORACIC ECHOCARDIOGRAM REPORT        Patient Name:   JO VARELA Patient Location: [facility] Rec #:  [id number]    Accession #:      [id number]  Account #:                    Height:           69.7 in 177.0 cm  YOB: 1945     Weight:           244.7 lb 111.00 kg  Patient Age:    75 years      BSA:              2.27 m²  Patient Gender: M             BP:               126/76 mmHg      Date of Exam:        3/14/2021 2:40:18 PM  Sonographer:         KIRK  Referring Physician: MARNI    Procedure:     2D Echo/Doppler/Color Doppler Complete.  Indications:   Dyspnea, unspecified - R06.00  Diagnosis:     Dyspnea, unspecified - R06.00  Study Details: Technically difficult study.        2D AND M-MODE MEASUREMENTS (normal ranges within parentheses):  Left Ventricle:                  Normal   Aorta/Left Atrium:          Normal  IVSd (2D):              1.28 cm (0.7-1.1) AorticRoot (2D):  3.75 cm (2.4-3.7)  LVPWd (2D):             1.29 cm (0.7-1.1) Left Atrium (2D):  4.50 cm (1.9-4.0)  LVIDd (2D):             5.37 cm (3.4-5.7) Right Ventricle:  LVIDs (2D):             4.20 cm           TAPSE:           1.12 cm  LV FS (2D):            21.8 %   (>25%)  Relative Wall Thickness  0.48    (<0.42)    LV DIASTOLIC FUNCTION:  MV Peak E: 1.04 m/s Decel Time:  308 msec  Septal e'  0.0 m/s  Septal E/e'  21.6  Lateral e' 0.1 m/s  Lateral E/e' 14.7    SPECTRAL DOPPLER ANALYSIS (where applicable):  Mitral Valve:  MV P1/2 Time: 89.34 msec  MV Area, PHT: 2.46 cm²    Aortic Valve: AoV Max Raphael: 2.28 m/s AoV Peak P.8 mmHg AoV Mean PG: 10.2 mmHg    LVOT Vmax: 0.66 m/s LVOT VTI: 0.144 m LVOT Diameter: 2.05 cm    AoV Area, Vmax: 0.95 cm² AoV Area, VTI: 1.17 cm² AoV Area, Vmn: 1.16 cm²    Aortic Insufficiency:  AI Half-time:  321 msec  AI Decel Rate: 2.97 m/s²    Tricuspid Valve and PA/RV Systolic Pressure: TR Max Velocity: 2.95 m/s RA Pressure: 8 mmHg RVSP/PASP: 42.7 mmHg    Pulmonic Valve:  PV Max Velocity: 0.68 m/s PV Max P.9 mmHg PV Mean PG:      PHYSICIAN INTERPRETATION:  Left Ventricle: The left ventricular internal cavity size is normal.  Global LV systolic function was moderately decreased. Left ventricular ejection fraction, by visual estimation, is 35 to 40%. Spectral Doppler shows pseudonormal pattern of left ventricular myocardial filling (Grade II diastolic dysfunction). Elevated left ventricular end-diastolic pressure.      LV Wall Scoring:  The mid andapical anterior wall and mid anteroseptal segment are hypokinetic.    Right Ventricle: Normal right ventricular size and function.  Left Atrium: Mildly enlarged left atrium.  Right Atrium: Mildly enlarged right atrium.  Pericardium: There is no evidence of pericardial effusion.  Mitral Valve: Mild thickening and calcification of the anterior and posterior mitral valve leaflets. Mitral leaflet mobility is normal. Trace mitral valve regurgitation is seen.  Tricuspid Valve: The tricuspid valve is not well visualized. Trivial tricuspid regurgitation is visualized. Estimated pulmonary artery systolic pressure is 42.7 mmHg assuming a right atrial pressure of 8 mmHg, which is consistent with mild pulmonary hypertension.  Aortic Valve: The aortic valve was not well visualized. Moderate aortic stenosis is present. Peak transaortic gradient equals 20.8 mmHg, mean transaortic gradient equals 10.2 mmHg, the calculated aortic valve area equals 1.17 cm² by the continuity equation consistent with moderate aortic stenosis. Moderate aortic valve regurgitation is seen.  Pulmonic Valve: The pulmonic valve was not well visualized. Mild pulmonic valve regurgitation.  Aorta: Aortic root measured at Sinus of Valsalva is normal.      Summary:   1. Left ventricular ejection fraction, by visual estimation, is 35 to 40%.   2. Technically difficult study.   3. Moderately decreased global left ventricular systolic function.   4. Mid and apical anterior wall and mid anteroseptal segment are abnormal as described above.   5. Elevated left ventricular end-diastolic pressure.   6. Normal left ventricular internal cavity size.   7. Spectral Doppler shows pseudonormal pattern of left ventricular myocardial filling (Grade II diastolic dysfunction).   8. Normal right ventricular size and function.   9. Mildly enlarged left atrium.  10. Mildly enlarged right atrium.  11. There is no evidence of pericardial effusion.  12. Mild thickening and calcification of the anterior and posterior mitral valve leaflets.  13. Trace mitral valve regurgitation.  14. Moderate aortic regurgitation.  15. Moderate aortic valve stenosis.  16. Mild pulmonic valve regurgitation.  17. Estimated pulmonary artery systolic pressure is 42.7 mmHg assuming a right atrial pressure of 8 mmHg, which is consistent with mild pulmonary hypertension.  18. Consider limited Definity echocontrast follow up study to better evaluate left ventricular function.  19. Peak transaortic gradient equals 20.8 mmHg, mean transaortic gradient equals 10.2mmHg, the calculated aortic valve area equals 1.17 cm² by the continuity equation consistent with moderate aortic stenosis.    Jayce Vamra MD FACC, FASE, FACP  Electronically signed on 3/15/2021 at 12:12:05 PM      CARDIOLOGY CONSULTATION NOTE                                                                               JO VARELA is a 75y Male with known hx DM, HTN, HLD, Obesity, COPD (on home o2 2L), CHF, CAD s/p quadruple bypass with PPM/AICD, presenting with difficulty breathing x 2 days with acute worsening this afternoon. EMS called, pt found to be in resp distress; pt placed on cpap and brought to ED. Found to be hypertensive in field to 180s / 90s with rales in bilateral lung fields. Given 5 nitroglycerin sprays, brought bp to 130s/80s.      (2021 03:00)      REVIEW OF SYSTEMS:  -----------------------------    CONSTITUTIONAL: No fever, weight loss, or fatigue  EYES: No eye pain, visual disturbances, or discharge  ENMT:  No difficulty hearing, tinnitus, vertigo; No sinus or throat pain  NECK: No pain or stiffness  BREASTS: No pain, masses, or nipple discharge  RESPIRATORY: No cough, wheezing, chills or hemoptysis; No shortness of breath  CARDIOVASCULAR: See HPI  GASTROINTESTINAL: No abdominal or epigastric pain. No nausea, vomiting, or hematemesis; No diarrhea or constipation. No melena or hematochezia.  GENITOURINARY: No dysuria, frequency, hematuria, or incontinence  NEUROLOGICAL: No headaches, memory loss, loss of strength, numbness, or tremors  SKIN: No itching, burning, rashes, or lesions   LYMPH NODES: No enlarged glands  ENDOCRINE: No heat or cold intolerance; No hair loss  MUSCULOSKELETAL: No joint pain or swelling; No muscle, back, or extremity pain  PSYCHIATRIC: No depression, anxiety, mood swings, or difficulty sleeping  HEME/LYMPH: No easy bruising, or bleeding gums  ALLERGY AND IMMUNOLOGIC: No hives or eczema    Home Medications:  allopurinol 100 mg oral tablet: 2 tab(s) orally once a day (13 Mar 2021 15:08)  aspirin 81 mg oral tablet: 1 tab(s) orally once a day (13 Mar 2021 15:08)  folic acid 1 mg oral tablet: 1 tab(s) orally once a day (13 Mar 2021 15:08)  Januvia 50 mg oral tablet: 1 tab(s) orally once a day (13 Mar 2021 15:08)  Lasix 40 mg oral tablet: 1 tab(s) orally once a day (13 Mar 2021 15:08)  Metoprolol Succinate ER 50 mg oral tablet, extended release: 1.5 tab(s) orally once a day (13 Mar 2021 15:08)  nateglinide 120 mg oral tablet: 1 tab(s) orally 3 times a day (before meals) (13 Mar 2021 15:08)  tamsulosin 0.4 mg oral capsule: 1 cap(s) orally once a day (13 Mar 2021 15:08)  Toujeo SoloStar 300 units/mL subcutaneous solution: 53  subcutaneous once a day (at bedtime) (13 Mar 2021 15:08)  Trulicity Pen 1.5 mg/0.5 mL subcutaneous solution: once a week (13 Mar 2021 15:08)  Xarelto 20 mg oral tablet: 1 tab(s) orally once a day (in the evening) (13 Mar 2021 15:08)  Zocor 40 mg oral tablet: 1 tab(s) orally once a day (at bedtime) (13 Mar 2021 15:08)      MEDICATIONS  (STANDING):  albuterol/ipratropium for Nebulization 3 milliLiter(s) Nebulizer every 6 hours  allopurinol 100 milliGRAM(s) Oral daily  aspirin enteric coated 81 milliGRAM(s) Oral daily  furosemide   Injectable 40 milliGRAM(s) IV Push two times a day  glucagon  Injectable 1 milliGRAM(s) IntraMuscular once  insulin glargine Injectable (LANTUS) 10 Unit(s) SubCutaneous at bedtime  insulin lispro (ADMELOG) corrective regimen sliding scale   SubCutaneous three times a day before meals  insulin lispro Injectable (ADMELOG) 5 Unit(s) SubCutaneous three times a day before meals  methylPREDNISolone sodium succinate Injectable 40 milliGRAM(s) IV Push every 8 hours  metoprolol succinate ER 75 milliGRAM(s) Oral daily  rivaroxaban 15 milliGRAM(s) Oral with dinner  simvastatin 40 milliGRAM(s) Oral at bedtime  tamsulosin 0.4 milliGRAM(s) Oral at bedtime      ALLERGIES: No Known Allergies      FAMILY HISTORY:  No pertinent family history in first degree relatives        PHYSICAL EXAMINATION:  -----------------------------  T(C): 36.3 (21 @ 05:06), Max: 36.4 (21 @ 10:30)  HR: 77 (21 @ 06:49) (69 - 80)  BP: 116/75 (21 @ 05:06) (116/75 - 138/76)  RR: 18 (21 @ 05:06) (17 - 19)  SpO2: 96% (21 @ 06:49) (91% - 100%)  Wt(kg): --     @ 07:01  -   @ 07:00  --------------------------------------------------------  IN:  Total IN: 0 mL    OUT:    Stool (mL): 1 mL    Voided (mL): 501 mL  Total OUT: 502 mL    Total NET: -502 mL            Constitutional: well developed, normal appearance, well groomed, well nourished, no deformities and no acute distress.   Eyes: the conjunctiva exhibited no abnormalities and the eyelids demonstrated no xanthelasmas.   HEENT: normal oral mucosa, no oral pallor and no oral cyanosis.   Neck: normal jugular venous A waves present, normal jugular venous V waves present and no jugular venous nava A waves.   Pulmonary: no respiratory distress, normal respiratory rhythm and effort, no accessory muscle use and lungs were clear to auscultation bilaterally.   Cardiovascular: heart rate and rhythm were normal, normal S1 and S2 and no murmur, gallop, rub, heave or thrill are present.   Abdomen: soft, non-tender, no hepato-splenomegaly and no abdominal mass palpated.   Musculoskeletal: the gait could not be assessed..   Extremities: no clubbing of the fingernails, no localized cyanosis, no petechial hemorrhages and no ischemic changes.   Skin: normal skin color and pigmentation, no rash, no venous stasis, no skin lesions, no skin ulcer and no xanthoma was observed.   Psychiatric: oriented to person, place, and time, the affect was normal, the mood was normal and not feeling anxious.     ECG:  -------  < from: 12 Lead ECG (07.10.21 @ 22:51) >    Ventricular Rate 80 BPM    Atrial Rate 69 BPM    QRS Duration 198 ms    Q-T Interval 492 ms    QTC Calculation(Bazett) 567 ms    R Axis -36 degrees    T Axis 68 degrees    Diagnosis Line Ventricular-paced rhythm  Biventricular pacemaker detected  Abnormal ECG  When compared with ECG of 13-MAR-2021 14:43,  Vent. rate has increased BY   9 BPM  Confirmed by Jakub Emmanuel MD ([id number]) on 2021 7:40:50 AM    < end of copied text >        LABS:   --------      138  |  104  |  38<H>  ----------------------------<  441<H>  4.8   |  25  |  2.00<H>    Ca    8.1<L>      2021 00:50    TPro  7.1  /  Alb  3.5  /  TBili  1.0  /  DBili  x   /  AST  89<H>  /  ALT  83<H>  /  AlkPhos  95                           13.5   15.55 )-----------( 151      ( 10 Jul 2021 23:11 )             44.2     PT/INR - ( 10 Jul 2021 23:36 )   PT: 24.9 sec;   INR: 2.23 ratio         PTT - ( 10 Jul 2021 23:36 )  PTT:28.9 sec     @ 13:00 CPK total:--, CKMB --, Troponin I - .299 ng/mL<H>  07-10 @ 23:11 CPK total:--, CKMB --, Troponin I - .187 ng/mL<H>          RADIOLOGY REPORTS:  -----------------------------  < from: Xray Chest 1 View- PORTABLE-Urgent (Xray Chest 1 View- PORTABLE-Urgent .) (21 @ 03:36) >    EXAM:  XR CHEST PORTABLE URGENT 1V                            PROCEDURE DATE:  2021          INTERPRETATION:  AP semierect chest on 2021 at 3:20 AM. Patient is short of breath.    Gross heart enlargement, sternotomy, and left-sided defibrillator again noted.    On  of this year there was significant congestive findings.    On has an examination similar findings are seen.    IMPRESSION: Mild CHF unchanged. Stable cardiac findings.    --- End of Report ---            NAA ESQUIVEL; Attending Radiologist  This document has been electronically signed. 2021  3:35PM    < end of copied text >        ECHOCARDIOGRAM:  ---------------------------  < from: TTE Echo Complete w/o Contrast w/ Doppler (21 @ 14:40) >     EXAM:  ECHO TTE WO CON COMP W DOPP         PROCEDURE DATE:  2021        INTERPRETATION:  REPORT:  TRANSTHORACIC ECHOCARDIOGRAM REPORT        Patient Name:   JO VARELA Patient Location: [facility] Rec #:  [id number]    Accession #:      [id number]  Account #:                    Height:           69.7 in 177.0 cm  YOB: 1945     Weight:           244.7 lb 111.00 kg  Patient Age:    75 years      BSA:              2.27 m²  Patient Gender: M             BP:               126/76 mmHg      Date of Exam:        3/14/2021 2:40:18 PM  Sonographer:         KIRK  Referring Physician: MARNI    Procedure:     2D Echo/Doppler/Color Doppler Complete.  Indications:   Dyspnea, unspecified - R06.00  Diagnosis:     Dyspnea, unspecified - R06.00  Study Details: Technically difficult study.        2D AND M-MODE MEASUREMENTS (normal ranges within parentheses):  Left Ventricle:                  Normal   Aorta/Left Atrium:          Normal  IVSd (2D):              1.28 cm (0.7-1.1) AorticRoot (2D):  3.75 cm (2.4-3.7)  LVPWd (2D):             1.29 cm (0.7-1.1) Left Atrium (2D):  4.50 cm (1.9-4.0)  LVIDd (2D):             5.37 cm (3.4-5.7) Right Ventricle:  LVIDs (2D):             4.20 cm           TAPSE:           1.12 cm  LV FS (2D):            21.8 %   (>25%)  Relative Wall Thickness  0.48    (<0.42)    LV DIASTOLIC FUNCTION:  MV Peak E: 1.04 m/s Decel Time:  308 msec  Septal e'  0.0 m/s  Septal E/e'  21.6  Lateral e' 0.1 m/s  Lateral E/e' 14.7    SPECTRAL DOPPLER ANALYSIS (where applicable):  Mitral Valve:  MV P1/2 Time: 89.34 msec  MV Area, PHT: 2.46 cm²    Aortic Valve: AoV Max Raphael: 2.28 m/s AoV Peak P.8 mmHg AoV Mean PG: 10.2 mmHg    LVOT Vmax: 0.66 m/s LVOT VTI: 0.144 m LVOT Diameter: 2.05 cm    AoV Area, Vmax: 0.95 cm² AoV Area, VTI: 1.17 cm² AoV Area, Vmn: 1.16 cm²    Aortic Insufficiency:  AI Half-time:  321 msec  AI Decel Rate: 2.97 m/s²    Tricuspid Valve and PA/RV Systolic Pressure: TR Max Velocity: 2.95 m/s RA Pressure: 8 mmHg RVSP/PASP: 42.7 mmHg    Pulmonic Valve:  PV Max Velocity: 0.68 m/s PV Max P.9 mmHg PV Mean PG:      PHYSICIAN INTERPRETATION:  Left Ventricle: The left ventricular internal cavity size is normal.  Global LV systolic function was moderately decreased. Left ventricular ejection fraction, by visual estimation, is 35 to 40%. Spectral Doppler shows pseudonormal pattern of left ventricular myocardial filling (Grade II diastolic dysfunction). Elevated left ventricular end-diastolic pressure.      LV Wall Scoring:  The mid andapical anterior wall and mid anteroseptal segment are hypokinetic.    Right Ventricle: Normal right ventricular size and function.  Left Atrium: Mildly enlarged left atrium.  Right Atrium: Mildly enlarged right atrium.  Pericardium: There is no evidence of pericardial effusion.  Mitral Valve: Mild thickening and calcification of the anterior and posterior mitral valve leaflets. Mitral leaflet mobility is normal. Trace mitral valve regurgitation is seen.  Tricuspid Valve: The tricuspid valve is not well visualized. Trivial tricuspid regurgitation is visualized. Estimated pulmonary artery systolic pressure is 42.7 mmHg assuming a right atrial pressure of 8 mmHg, which is consistent with mild pulmonary hypertension.  Aortic Valve: The aortic valve was not well visualized. Moderate aortic stenosis is present. Peak transaortic gradient equals 20.8 mmHg, mean transaortic gradient equals 10.2 mmHg, the calculated aortic valve area equals 1.17 cm² by the continuity equation consistent with moderate aortic stenosis. Moderate aortic valve regurgitation is seen.  Pulmonic Valve: The pulmonic valve was not well visualized. Mild pulmonic valve regurgitation.  Aorta: Aortic root measured at Sinus of Valsalva is normal.      Summary:   1. Left ventricular ejection fraction, by visual estimation, is 35 to 40%.   2. Technically difficult study.   3. Moderately decreased global left ventricular systolic function.   4. Mid and apical anterior wall and mid anteroseptal segment are abnormal as described above.   5. Elevated left ventricular end-diastolic pressure.   6. Normal left ventricular internal cavity size.   7. Spectral Doppler shows pseudonormal pattern of left ventricular myocardial filling (Grade II diastolic dysfunction).   8. Normal right ventricular size and function.   9. Mildly enlarged left atrium.  10. Mildly enlarged right atrium.  11. There is no evidence of pericardial effusion.  12. Mild thickening and calcification of the anterior and posterior mitral valve leaflets.  13. Trace mitral valve regurgitation.  14. Moderate aortic regurgitation.  15. Moderate aortic valve stenosis.  16. Mild pulmonic valve regurgitation.  17. Estimated pulmonary artery systolic pressure is 42.7 mmHg assuming a right atrial pressure of 8 mmHg, which is consistent with mild pulmonary hypertension.  18. Consider limited Definity echocontrast follow up study to better evaluate left ventricular function.  19. Peak transaortic gradient equals 20.8 mmHg, mean transaortic gradient equals 10.2mmHg, the calculated aortic valve area equals 1.17 cm² by the continuity equation consistent with moderate aortic stenosis.    Jayce Varma MD FACC, FASE, FACP  Electronically signed on 3/15/2021 at 12:12:05 PM

## 2021-07-12 NOTE — PROGRESS NOTE ADULT - ASSESSMENT
75M hx DM, HTN, HLD, Obesity, COPD (on home o2 2L), CHF, CAD s/p quadruple bypass with PPM/AICD, presenting with difficulty breathing x 2 days with acute worsening this afternoon. EMS called, pt found to be in resp distress; pt placed on cpap and brought to ED. Found to be hypertensive in field to 180s / 90s with rales in bilateral lung fields. Given 5 nitroglycerin sprays, brought bp to 130s/80s.     Acute on chronic hypercarbic respiratory failure:  - cont bipap , pulm f/u   - steroids, enbs, suppl oxygen       COPD exacerbation:  - Continue Solumedrol, taper down as indicated  - Continue Duo neb  - Has home O2    Acute on chronic combined systolic and diastolic CHF:  - EF 35%, mod AR, mod AS  - S/PO AICD  - Continue IV Lasix  - Fluid and salt restriction daily wt  - cards f/u    Hypertensive urgency:  - BP improved  - Continue current meds    CAD:  - Continue ASA, statin    DM with hyperglycemia:  - Check Hb aqc  - Continue current insulin regimen, monitor to make further adjustment     CKD stage 3:  - Cr stable at baseline  - Avoid nephrotoxics    Elevated LFTs:  - Improved since last admission  - Check ab sono    Chronic A fib:  - Rate controlled, on BB  - Continue Xarelto ( renally dosed)    EMBER:  - Continue CPAP    Morbid obesity:  - Nutrition eval    HLD:  - Continue statin , hold if LFTs worsens    DVT ppx

## 2021-07-12 NOTE — CONSULT NOTE ADULT - ASSESSMENT
The chart has been reviewed but the patient has not yet been examined.  Full note to follow. 75y Male with known hx DM, HTN, HLD, Obesity, COPD (on home o2 2L), CHF, CAD s/p $vCABG with AICD for ischemic cardiomyopathy.  Admitted with respiratory distress x  2 days. EMS found him in resp failure and hypertensive 180s / 90s with rales in bilateral lung fields.    Imp:  Acute on Chronic Systolic/Diastolic Heart Failure  Hypoxemic Hypercarbic Resp Failure  Hypertensive Urgency  Aortic Stenosis  Mild pulmonary hypertension    Plan:  Diuresing adequately, on IV Lasix.  Continue Metoprolol. ARB/ACE relatively contraindicated due to CKD Stage III.  With improved resp status, BP's have returned to normal.  Would benefit from Hydralzaine/Nitrate combination for improved afterload reduction.  Monitor renal function, weights.  Repeat TTe to r/o worsening AS.

## 2021-07-12 NOTE — PROGRESS NOTE ADULT - SUBJECTIVE AND OBJECTIVE BOX
JO VARELA  75y  Male    Patient is a 75y old  Male who presents with a chief complaint of     HPI:  75M hx DM, HTN, HLD, Obesity, COPD (on home o2 2L), CHF, CAD s/p quadruple bypass with PPM/AICD    on IV lasix, feels better  says he is following the fluid restriction protocol    PAST MEDICAL & SURGICAL HISTORY:  HTN (hypertension)    DM (diabetes mellitus), type 2    High cholesterol    CHF (congestive heart failure)    Pneumonia    COPD (chronic obstructive pulmonary disease)    Pacemaker    AICD (automatic cardioverter/defibrillator) present    S/P CABG x 3  cabg3  in 2003    S/P cardiac pacemaker procedure  defibrilator 2012    S/P cardiac cath  cardiac stentin 2011    S/P hernia repair  hernia [id number]            PHYSICAL EXAM:    T(C): 36.2 (07-12-21 @ 10:44), Max: 36.4 (07-11-21 @ 15:39)  HR: 71 (07-12-21 @ 10:44) (69 - 80)  BP: 116/67 (07-12-21 @ 10:44) (116/67 - 138/76)  RR: 18 (07-12-21 @ 10:44) (17 - 19)  SpO2: 97% (07-12-21 @ 10:44) (91% - 100%)  Wt(kg): --    I&O's Detail    11 Jul 2021 07:01  -  12 Jul 2021 07:00  --------------------------------------------------------  IN:  Total IN: 0 mL    OUT:    Stool (mL): 1 mL    Voided (mL): 501 mL  Total OUT: 502 mL    Total NET: -502 mL          Respiratory: clear anteriorly, decreased BS at bases  Cardiovascular: S1 S2  Gastrointestinal: soft NT ND +BS  Extremities: one plus edema   Neuro: Awake and alert    MEDICATIONS  (STANDING):  albuterol/ipratropium for Nebulization 3 milliLiter(s) Nebulizer every 6 hours  allopurinol 100 milliGRAM(s) Oral daily  aspirin enteric coated 81 milliGRAM(s) Oral daily  dextrose 40% Gel 15 Gram(s) Oral once  dextrose 5%. 1000 milliLiter(s) (50 mL/Hr) IV Continuous <Continuous>  dextrose 5%. 1000 milliLiter(s) (100 mL/Hr) IV Continuous <Continuous>  dextrose 50% Injectable 25 Gram(s) IV Push once  dextrose 50% Injectable 12.5 Gram(s) IV Push once  dextrose 50% Injectable 25 Gram(s) IV Push once  furosemide   Injectable 40 milliGRAM(s) IV Push two times a day  glucagon  Injectable 1 milliGRAM(s) IntraMuscular once  insulin glargine Injectable (LANTUS) 10 Unit(s) SubCutaneous at bedtime  insulin lispro (ADMELOG) corrective regimen sliding scale   SubCutaneous three times a day before meals  insulin lispro Injectable (ADMELOG) 5 Unit(s) SubCutaneous three times a day before meals  methylPREDNISolone sodium succinate Injectable 40 milliGRAM(s) IV Push every 8 hours  metoprolol succinate ER 75 milliGRAM(s) Oral daily  rivaroxaban 15 milliGRAM(s) Oral with dinner  simvastatin 40 milliGRAM(s) Oral at bedtime  tamsulosin 0.4 milliGRAM(s) Oral at bedtime    MEDICATIONS  (PRN):                            13.3   16.72 )-----------( 135      ( 12 Jul 2021 07:33 )             43.6       07-12    141  |  104  |  58<H>  ----------------------------<  352<H>  4.5   |  28  |  2.10<H>    Ca    8.6      12 Jul 2021 07:33    TPro  6.8  /  Alb  3.3  /  TBili  0.8  /  DBili  x   /  AST  55<H>  /  ALT  72  /  AlkPhos  106  07-12      Creatinine Trend: Creatinine Trend: 2.10<--, 2.00<--

## 2021-07-12 NOTE — PROGRESS NOTE ADULT - ASSESSMENT
75M hx DM, HTN, HLD, Obesity, COPD (on home o2 2L), CHF, CAD s/p quadruple bypass with PPM/AICD now with COPD exacerbation along with volume overload. 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 Will continue the lasix 40 mg IV twice a day with strict fluid restriction. Renal sonogram shows some chronicity. Will follow closely. Labs for AM ordered.

## 2021-07-13 DIAGNOSIS — E09.9 DRUG OR CHEMICAL INDUCED DIABETES MELLITUS WITHOUT COMPLICATIONS: ICD-10-CM

## 2021-07-13 LAB
ALBUMIN SERPL ELPH-MCNC: 3.3 G/DL — SIGNIFICANT CHANGE UP (ref 3.3–5)
ALP SERPL-CCNC: 107 U/L — SIGNIFICANT CHANGE UP (ref 40–120)
ALT FLD-CCNC: 72 U/L — SIGNIFICANT CHANGE UP (ref 12–78)
ANION GAP SERPL CALC-SCNC: 7 MMOL/L — SIGNIFICANT CHANGE UP (ref 5–17)
AST SERPL-CCNC: 40 U/L — HIGH (ref 15–37)
BILIRUB SERPL-MCNC: 0.7 MG/DL — SIGNIFICANT CHANGE UP (ref 0.2–1.2)
BUN SERPL-MCNC: 75 MG/DL — HIGH (ref 7–23)
CALCIUM SERPL-MCNC: 8.6 MG/DL — SIGNIFICANT CHANGE UP (ref 8.5–10.1)
CHLORIDE SERPL-SCNC: 104 MMOL/L — SIGNIFICANT CHANGE UP (ref 96–108)
CO2 SERPL-SCNC: 29 MMOL/L — SIGNIFICANT CHANGE UP (ref 22–31)
CREAT SERPL-MCNC: 2.3 MG/DL — HIGH (ref 0.5–1.3)
GLUCOSE BLDC GLUCOMTR-MCNC: 314 MG/DL — HIGH (ref 70–99)
GLUCOSE BLDC GLUCOMTR-MCNC: 323 MG/DL — HIGH (ref 70–99)
GLUCOSE BLDC GLUCOMTR-MCNC: 416 MG/DL — HIGH (ref 70–99)
GLUCOSE BLDC GLUCOMTR-MCNC: 423 MG/DL — HIGH (ref 70–99)
GLUCOSE BLDC GLUCOMTR-MCNC: 487 MG/DL — CRITICAL HIGH (ref 70–99)
GLUCOSE BLDC GLUCOMTR-MCNC: 492 MG/DL — CRITICAL HIGH (ref 70–99)
GLUCOSE BLDC GLUCOMTR-MCNC: 530 MG/DL — CRITICAL HIGH (ref 70–99)
GLUCOSE BLDC GLUCOMTR-MCNC: 533 MG/DL — CRITICAL HIGH (ref 70–99)
GLUCOSE BLDC GLUCOMTR-MCNC: 535 MG/DL — CRITICAL HIGH (ref 70–99)
GLUCOSE SERPL-MCNC: 361 MG/DL — HIGH (ref 70–99)
HCT VFR BLD CALC: 43.4 % — SIGNIFICANT CHANGE UP (ref 39–50)
HGB BLD-MCNC: 13.2 G/DL — SIGNIFICANT CHANGE UP (ref 13–17)
MCHC RBC-ENTMCNC: 29.5 PG — SIGNIFICANT CHANGE UP (ref 27–34)
MCHC RBC-ENTMCNC: 30.4 GM/DL — LOW (ref 32–36)
MCV RBC AUTO: 96.9 FL — SIGNIFICANT CHANGE UP (ref 80–100)
NRBC # BLD: 0 /100 WBCS — SIGNIFICANT CHANGE UP (ref 0–0)
PLATELET # BLD AUTO: 140 K/UL — LOW (ref 150–400)
POTASSIUM SERPL-MCNC: 4.8 MMOL/L — SIGNIFICANT CHANGE UP (ref 3.5–5.3)
POTASSIUM SERPL-SCNC: 4.8 MMOL/L — SIGNIFICANT CHANGE UP (ref 3.5–5.3)
PROT SERPL-MCNC: 6.8 GM/DL — SIGNIFICANT CHANGE UP (ref 6–8.3)
RBC # BLD: 4.48 M/UL — SIGNIFICANT CHANGE UP (ref 4.2–5.8)
RBC # FLD: 16.6 % — HIGH (ref 10.3–14.5)
SODIUM SERPL-SCNC: 140 MMOL/L — SIGNIFICANT CHANGE UP (ref 135–145)
WBC # BLD: 15.7 K/UL — HIGH (ref 3.8–10.5)
WBC # FLD AUTO: 15.7 K/UL — HIGH (ref 3.8–10.5)

## 2021-07-13 PROCEDURE — 93306 TTE W/DOPPLER COMPLETE: CPT | Mod: 26

## 2021-07-13 PROCEDURE — 99233 SBSQ HOSP IP/OBS HIGH 50: CPT

## 2021-07-13 PROCEDURE — 99232 SBSQ HOSP IP/OBS MODERATE 35: CPT

## 2021-07-13 RX ORDER — INSULIN GLARGINE 100 [IU]/ML
17 INJECTION, SOLUTION SUBCUTANEOUS AT BEDTIME
Refills: 0 | Status: DISCONTINUED | OUTPATIENT
Start: 2021-07-13 | End: 2021-07-13

## 2021-07-13 RX ORDER — INSULIN LISPRO 100/ML
20 VIAL (ML) SUBCUTANEOUS ONCE
Refills: 0 | Status: COMPLETED | OUTPATIENT
Start: 2021-07-13 | End: 2021-07-13

## 2021-07-13 RX ORDER — INSULIN LISPRO 100/ML
10 VIAL (ML) SUBCUTANEOUS
Refills: 0 | Status: DISCONTINUED | OUTPATIENT
Start: 2021-07-14 | End: 2021-07-15

## 2021-07-13 RX ORDER — INSULIN LISPRO 100/ML
8 VIAL (ML) SUBCUTANEOUS
Refills: 0 | Status: DISCONTINUED | OUTPATIENT
Start: 2021-07-13 | End: 2021-07-13

## 2021-07-13 RX ORDER — INSULIN GLARGINE 100 [IU]/ML
20 INJECTION, SOLUTION SUBCUTANEOUS AT BEDTIME
Refills: 0 | Status: DISCONTINUED | OUTPATIENT
Start: 2021-07-13 | End: 2021-07-13

## 2021-07-13 RX ORDER — INSULIN GLARGINE 100 [IU]/ML
30 INJECTION, SOLUTION SUBCUTANEOUS AT BEDTIME
Refills: 0 | Status: DISCONTINUED | OUTPATIENT
Start: 2021-07-14 | End: 2021-07-15

## 2021-07-13 RX ORDER — INSULIN LISPRO 100/ML
6 VIAL (ML) SUBCUTANEOUS ONCE
Refills: 0 | Status: COMPLETED | OUTPATIENT
Start: 2021-07-13 | End: 2021-07-13

## 2021-07-13 RX ORDER — SODIUM CHLORIDE 9 MG/ML
1000 INJECTION, SOLUTION INTRAVENOUS
Refills: 0 | Status: DISCONTINUED | OUTPATIENT
Start: 2021-07-13 | End: 2021-07-13

## 2021-07-13 RX ORDER — INSULIN LISPRO 100/ML
10 VIAL (ML) SUBCUTANEOUS ONCE
Refills: 0 | Status: COMPLETED | OUTPATIENT
Start: 2021-07-13 | End: 2021-07-13

## 2021-07-13 RX ADMIN — Medication 10 MILLIGRAM(S): at 13:59

## 2021-07-13 RX ADMIN — Medication 75 MILLIGRAM(S): at 05:08

## 2021-07-13 RX ADMIN — Medication 3 MILLILITER(S): at 00:43

## 2021-07-13 RX ADMIN — Medication 40 MILLIGRAM(S): at 05:04

## 2021-07-13 RX ADMIN — Medication 81 MILLIGRAM(S): at 12:26

## 2021-07-13 RX ADMIN — Medication 20 UNIT(S): at 19:16

## 2021-07-13 RX ADMIN — Medication 40 MILLIGRAM(S): at 21:12

## 2021-07-13 RX ADMIN — INSULIN GLARGINE 20 UNIT(S): 100 INJECTION, SOLUTION SUBCUTANEOUS at 21:10

## 2021-07-13 RX ADMIN — Medication 5 UNIT(S): at 08:06

## 2021-07-13 RX ADMIN — Medication 8 UNIT(S): at 17:07

## 2021-07-13 RX ADMIN — SIMVASTATIN 40 MILLIGRAM(S): 20 TABLET, FILM COATED ORAL at 21:12

## 2021-07-13 RX ADMIN — Medication 3 MILLILITER(S): at 05:05

## 2021-07-13 RX ADMIN — Medication 6: at 17:08

## 2021-07-13 RX ADMIN — Medication 10 MILLIGRAM(S): at 05:10

## 2021-07-13 RX ADMIN — RIVAROXABAN 15 MILLIGRAM(S): KIT at 17:07

## 2021-07-13 RX ADMIN — TAMSULOSIN HYDROCHLORIDE 0.4 MILLIGRAM(S): 0.4 CAPSULE ORAL at 21:12

## 2021-07-13 RX ADMIN — ISOSORBIDE DINITRATE 10 MILLIGRAM(S): 5 TABLET ORAL at 17:07

## 2021-07-13 RX ADMIN — Medication 10 MILLIGRAM(S): at 21:12

## 2021-07-13 RX ADMIN — Medication 6: at 12:29

## 2021-07-13 RX ADMIN — Medication 100 MILLIGRAM(S): at 12:26

## 2021-07-13 RX ADMIN — Medication 6 UNIT(S): at 22:55

## 2021-07-13 RX ADMIN — ISOSORBIDE DINITRATE 10 MILLIGRAM(S): 5 TABLET ORAL at 12:26

## 2021-07-13 RX ADMIN — ISOSORBIDE DINITRATE 10 MILLIGRAM(S): 5 TABLET ORAL at 05:10

## 2021-07-13 RX ADMIN — Medication 40 MILLIGRAM(S): at 05:02

## 2021-07-13 RX ADMIN — Medication 40 MILLIGRAM(S): at 13:59

## 2021-07-13 RX ADMIN — Medication 3 MILLILITER(S): at 17:05

## 2021-07-13 RX ADMIN — Medication 4: at 08:05

## 2021-07-13 RX ADMIN — Medication 10 UNIT(S): at 18:20

## 2021-07-13 RX ADMIN — SODIUM CHLORIDE 100 MILLILITER(S): 9 INJECTION, SOLUTION INTRAVENOUS at 17:09

## 2021-07-13 RX ADMIN — Medication 40 MILLIGRAM(S): at 17:09

## 2021-07-13 NOTE — PROGRESS NOTE ADULT - ASSESSMENT
75y Male with known hx DM, HTN, HLD, Obesity, COPD (on home o2 2L), CHF, CAD s/p 4 vessel CABG with AICD for ischemic cardiomyopathy.  Admitted with respiratory distress x  2 days. EMS found him in resp failure and hypertensive 180s / 90s with rales in bilateral lung fields.    Imp:  Acute on Chronic Systolic/Diastolic Heart Failure  Hypoxemic Hypercarbic Resp Failure  Hypertensive Urgency  Aortic Stenosis  Mild pulmonary hypertension    Plan:  Diuresing adequately, on IV Lasix.  Continue Metoprolol. ARB/ACE relatively contraindicated due to CKD Stage III.  With improved resp status, BP's have returned to normal.  Would benefit from Hydralzaine/Nitrate combination for improved afterload reduction.  Monitor renal function,  daily weights, I&Os. Increase in Cr noted, will evaluate in AM  Repeat TTE Report pending  to r/o worsening AS. 75y Male with known hx DM, HTN, HLD, Obesity, COPD (on home o2 2L), CHF, CAD s/p 4 vessel CABG with AICD for ischemic cardiomyopathy.  Admitted with respiratory distress x  2 days. EMS found him in resp failure and hypertensive 180s / 90s with rales in bilateral lung fields.    Imp:  Acute on Chronic Systolic/Diastolic Heart Failure  Hypoxemic Hypercarbic Resp Failure  Hypertensive Urgency  Aortic Stenosis  Mild pulmonary hypertension    Plan:  Diuresing adequately, on IV Lasix. If Cr increases will change to Bumex  Continue Metoprolol. ARB/ACE relatively contraindicated due to CKD Stage III.  With improved resp status, BP's have returned to normal.  Would benefit from Hydralzaine/Nitrate combination for improved afterload reduction.  Monitor renal function,  daily weights, I&Os. Increase in Cr noted, will evaluate in AM  DM management per medicine and Endocrine  Repeat TTE Report pending  to r/o worsening AS.

## 2021-07-13 NOTE — PHYSICAL THERAPY INITIAL EVALUATION ADULT - CRITERIA FOR SKILLED THERAPEUTIC INTERVENTIONS
Home with home PT, No DME needed May need more O2/impairments found/functional limitations in following categories/risk reduction/prevention/rehab potential/therapy frequency/predicted duration of therapy intervention/anticipated equipment needs at discharge/anticipated discharge recommendation

## 2021-07-13 NOTE — PHYSICAL THERAPY INITIAL EVALUATION ADULT - ADDITIONAL COMMENTS
As per pt, he lives with his family in a house with 20 steps to enter with bilateral rails, once inside there are 1 flight of stairs with bilateral rails to go to his bed room, He was independent in all functional mobility without any AD, PTA, He has home O2 2-4L/min

## 2021-07-13 NOTE — CONSULT NOTE ADULT - PROBLEM SELECTOR RECOMMENDATION 9
HbA1c being 8.6 point towards not severely uncontrolled diabetes  inpatient mostly steroid induced hyperglycemia   Increase Lantus to 30 units and prandial lispro to 10 units   once steroids are decreased expect better finger sticks   while inpatient Finger Sticks should be in 140-180 range which is difficult to achieve as the patient has increased insulin resistance, increased glucose toxicity and is on steroids   care to titrate insulin dose down as the steroid is decreased, as it may expose the patient to sudden hypoglycemia   Thank You for the courtesy of this consultation !!!

## 2021-07-13 NOTE — PHYSICAL THERAPY INITIAL EVALUATION ADULT - GAIT TRAINING, PT EVAL
Pt will be able to ambulate 350 feet using no AD, with cont O2 without c/o SOB /MARR  independently in 2 weeks

## 2021-07-13 NOTE — PROGRESS NOTE ADULT - SUBJECTIVE AND OBJECTIVE BOX
SUBJECTIVE / OVERNIGHT EVENTS: pt seen and examined    MEDICATIONS  (STANDING):  albuterol/ipratropium for Nebulization 3 milliLiter(s) Nebulizer every 6 hours  allopurinol 100 milliGRAM(s) Oral daily  aspirin enteric coated 81 milliGRAM(s) Oral daily  dextrose 40% Gel 15 Gram(s) Oral once  dextrose 5%. 1000 milliLiter(s) (50 mL/Hr) IV Continuous <Continuous>  dextrose 5%. 1000 milliLiter(s) (100 mL/Hr) IV Continuous <Continuous>  dextrose 50% Injectable 25 Gram(s) IV Push once  dextrose 50% Injectable 12.5 Gram(s) IV Push once  dextrose 50% Injectable 25 Gram(s) IV Push once  furosemide   Injectable 40 milliGRAM(s) IV Push two times a day  glucagon  Injectable 1 milliGRAM(s) IntraMuscular once  hydrALAZINE 10 milliGRAM(s) Oral three times a day  insulin glargine Injectable (LANTUS) 30 Unit(s) SubCutaneous at bedtime  insulin lispro (ADMELOG) corrective regimen sliding scale   SubCutaneous three times a day before meals  isosorbide   dinitrate Tablet (ISORDIL) 10 milliGRAM(s) Oral three times a day  methylPREDNISolone sodium succinate Injectable 20 milliGRAM(s) IV Push every 8 hours  metoprolol succinate ER 75 milliGRAM(s) Oral daily  rivaroxaban 15 milliGRAM(s) Oral with dinner  simvastatin 40 milliGRAM(s) Oral at bedtime  tamsulosin 0.4 milliGRAM(s) Oral at bedtime    MEDICATIONS  (PRN):    Vital Signs Last 24 Hrs  T(C): 36.3 (13 Jul 2021 16:08), Max: 36.5 (13 Jul 2021 10:26)  T(F): 97.4 (13 Jul 2021 16:08), Max: 97.7 (13 Jul 2021 10:26)  HR: 77 (13 Jul 2021 21:10) (69 - 77)  BP: 147/70 (13 Jul 2021 21:10) (124/63 - 147/70)  BP(mean): --  RR: 18 (13 Jul 2021 10:26) (18 - 18)  SpO2: 93% (13 Jul 2021 20:57) (92% - 98%)    Constitutional: No fever, fatigue  Skin: No rash.  Eyes: No recent vision problems or eye pain.  ENT: No congestion, ear pain, or sore throat.  Cardiovascular: No chest pain or palpation.  Respiratory: No cough, shortness of breath, congestion, or wheezing.  Gastrointestinal: No abdominal pain, nausea, vomiting, or diarrhea.  Genitourinary: No dysuria.  Musculoskeletal: No joint swelling.  Neurologic: No headache.    PHYSICAL EXAM:  GENERAL: NAD  EYES: EOMI, PERRLA  NECK: Supple, No JVD  CHEST/LUNG: dec breath sounds at bases   HEART:  S1 , S2 +  ABDOMEN: soft , bs+  EXTREMITIES:  edema+  NEUROLOGY:alert awake     LABS:  07-13    140  |  104  |  75<H>  ----------------------------<  361<H>  4.8   |  29  |  2.30<H>    Ca    8.6      13 Jul 2021 07:55    TPro  6.8  /  Alb  3.3  /  TBili  0.7  /  DBili      /  AST  40<H>  /  ALT  72  /  AlkPhos  107  07-13    Creatinine Trend: 2.30 <--, 2.10 <--, 2.00 <--                        13.2   15.70 )-----------( 140      ( 13 Jul 2021 07:55 )             43.4     Urine Studies:      CARDIAC MARKERS ( 12 Jul 2021 07:33 )  .149 ng/mL / x     / x     / x     / x          ABG - ( 12 Jul 2021 06:29 )  pH, Arterial: x     pH, Blood: 7.39  /  pCO2: 50    /  pO2: 85    / HCO3: 29    / Base Excess: 3.9   /  SaO2: 96                LIVER FUNCTIONS - ( 13 Jul 2021 07:55 )  Alb: 3.3 g/dL / Pro: 6.8 gm/dL / ALK PHOS: 107 U/L / ALT: 72 U/L / AST: 40 U/L / GGT: x

## 2021-07-13 NOTE — PROGRESS NOTE ADULT - SUBJECTIVE AND OBJECTIVE BOX
JO VARELA  75y  Male    Patient is a 75y old  Male who presents with a chief complaint of SOB (12 Jul 2021 11:09)    Seen at bedside  on oxygen.      PAST MEDICAL & SURGICAL HISTORY:  HTN (hypertension)    DM (diabetes mellitus), type 2    High cholesterol    CHF (congestive heart failure)    Pneumonia    COPD (chronic obstructive pulmonary disease)    Pacemaker    AICD (automatic cardioverter/defibrillator) present    S/P CABG x 3  cabg3  in 2003    S/P cardiac pacemaker procedure  defibrilator 2012    S/P cardiac cath  cardiac stentin 2011    S/P hernia repair  hernia [id number]            PHYSICAL EXAM:    T(C): 36.5 (07-13-21 @ 10:26), Max: 36.5 (07-13-21 @ 10:26)  HR: 70 (07-13-21 @ 10:26) (70 - 74)  BP: 133/69 (07-13-21 @ 10:26) (124/67 - 140/73)  RR: 18 (07-13-21 @ 10:26) (18 - 18)  SpO2: 92% (07-13-21 @ 10:26) (92% - 98%)  Wt(kg): --    I&O's Detail      Respiratory: clear anteriorly, decreased BS at bases  Cardiovascular: S1 S2  Gastrointestinal: soft NT ND +BS  Extremities: one plus edema   Neuro: Awake and alert    MEDICATIONS  (STANDING):  albuterol/ipratropium for Nebulization 3 milliLiter(s) Nebulizer every 6 hours  allopurinol 100 milliGRAM(s) Oral daily  aspirin enteric coated 81 milliGRAM(s) Oral daily  dextrose 40% Gel 15 Gram(s) Oral once  dextrose 5%. 1000 milliLiter(s) (50 mL/Hr) IV Continuous <Continuous>  dextrose 5%. 1000 milliLiter(s) (100 mL/Hr) IV Continuous <Continuous>  dextrose 50% Injectable 25 Gram(s) IV Push once  dextrose 50% Injectable 12.5 Gram(s) IV Push once  dextrose 50% Injectable 25 Gram(s) IV Push once  furosemide   Injectable 40 milliGRAM(s) IV Push two times a day  glucagon  Injectable 1 milliGRAM(s) IntraMuscular once  hydrALAZINE 10 milliGRAM(s) Oral three times a day  insulin glargine Injectable (LANTUS) 17 Unit(s) SubCutaneous at bedtime  insulin lispro (ADMELOG) corrective regimen sliding scale   SubCutaneous three times a day before meals  insulin lispro Injectable (ADMELOG) 8 Unit(s) SubCutaneous three times a day before meals  isosorbide   dinitrate Tablet (ISORDIL) 10 milliGRAM(s) Oral three times a day  methylPREDNISolone sodium succinate Injectable 40 milliGRAM(s) IV Push every 8 hours  metoprolol succinate ER 75 milliGRAM(s) Oral daily  rivaroxaban 15 milliGRAM(s) Oral with dinner  simvastatin 40 milliGRAM(s) Oral at bedtime  tamsulosin 0.4 milliGRAM(s) Oral at bedtime    MEDICATIONS  (PRN):                            13.2   15.70 )-----------( 140      ( 13 Jul 2021 07:55 )             43.4       07-13    140  |  104  |  75<H>  ----------------------------<  361<H>  4.8   |  29  |  2.30<H>    Ca    8.6      13 Jul 2021 07:55    TPro  6.8  /  Alb  3.3  /  TBili  0.7  /  DBili  x   /  AST  40<H>  /  ALT  72  /  AlkPhos  107  07-13      Creatinine Trend: Creatinine Trend: 2.30<--, 2.10<--, 2.00<--

## 2021-07-13 NOTE — PROGRESS NOTE ADULT - SUBJECTIVE AND OBJECTIVE BOX
Patient is a 75y old  Male who presents with a chief complaint of SOB (13 Jul 2021 13:10)    PAST MEDICAL & SURGICAL HISTORY:  HTN (hypertension)    DM (diabetes mellitus), type 2    High cholesterol    CHF (congestive heart failure)    Pneumonia    COPD (chronic obstructive pulmonary disease)    Pacemaker    AICD (automatic cardioverter/defibrillator) present    S/P CABG x 3  cabg3  in 2003    S/P cardiac pacemaker procedure  defibrilator 2012    S/P cardiac cath  cardiac stentin 2011    S/P hernia repair  hernia [id number]    INTERVAL HISTORY: Patient being diuresis with IV Lasix for fluid overload.  	  MEDICATIONS:  MEDICATIONS  (STANDING):  albuterol/ipratropium for Nebulization 3 milliLiter(s) Nebulizer every 6 hours  allopurinol 100 milliGRAM(s) Oral daily  aspirin enteric coated 81 milliGRAM(s) Oral daily  dextrose 40% Gel 15 Gram(s) Oral once  dextrose 5%. 1000 milliLiter(s) (50 mL/Hr) IV Continuous <Continuous>  dextrose 5%. 1000 milliLiter(s) (100 mL/Hr) IV Continuous <Continuous>  dextrose 50% Injectable 25 Gram(s) IV Push once  dextrose 50% Injectable 12.5 Gram(s) IV Push once  dextrose 50% Injectable 25 Gram(s) IV Push once  furosemide   Injectable 40 milliGRAM(s) IV Push two times a day  glucagon  Injectable 1 milliGRAM(s) IntraMuscular once  hydrALAZINE 10 milliGRAM(s) Oral three times a day  insulin glargine Injectable (LANTUS) 17 Unit(s) SubCutaneous at bedtime  insulin lispro (ADMELOG) corrective regimen sliding scale   SubCutaneous three times a day before meals  insulin lispro Injectable (ADMELOG) 8 Unit(s) SubCutaneous three times a day before meals  isosorbide   dinitrate Tablet (ISORDIL) 10 milliGRAM(s) Oral three times a day  methylPREDNISolone sodium succinate Injectable 40 milliGRAM(s) IV Push every 8 hours  metoprolol succinate ER 75 milliGRAM(s) Oral daily  rivaroxaban 15 milliGRAM(s) Oral with dinner  simvastatin 40 milliGRAM(s) Oral at bedtime  tamsulosin 0.4 milliGRAM(s) Oral at bedtime    MEDICATIONS  (PRN):      Vitals:  T(F): 97.4 (07-13-21 @ 16:08), Max: 97.7 (07-13-21 @ 10:26)  HR: 70 (07-13-21 @ 16:08) (69 - 74)  BP: 138/70 (07-13-21 @ 16:08) (124/63 - 140/73)  RR: 18 (07-13-21 @ 10:26) (18 - 18)  SpO2: 97% (07-13-21 @ 16:08) (92% - 98%)  Wt(kg): --114.2kg    Weight (kg): 114 (07-11 @ 07:15)  BMI (kg/m2): 41.8 (07-11 @ 07:15)      PHYSICAL EXAM:  Neuro: Awake, responsive  CV: S1 S2 RRR  Lungs: CTABL  GI: Soft, BS +, ND, NT  Extremities: No edema    TELEMETRY: Paced/PVC  	    ECG:  < from: 12 Lead ECG (07.10.21 @ 22:51) >  Ventricular Rate 80 BPM    Atrial Rate 69 BPM    QRS Duration 198 ms    Q-T Interval 492 ms    QTC Calculation(Bazett) 567 ms    R Axis -36 degrees    T Axis 68 degrees    Diagnosis Line Ventricular-paced rhythm  Biventricular pacemaker detected  Abnormal ECG  When compared with ECG of 13-MAR-2021 14:43,  Vent. rate has increased BY   9 BPM    < end of copied text >    RADIOLOGY: < from: Xray Chest 1 View- PORTABLE-Urgent (Xray Chest 1 View- PORTABLE-Urgent .) (07.11.21 @ 03:36) >  INTERPRETATION:  AP semierect chest on July 11, 2021 at 3:20 AM. Patient is short of breath.    Gross heart enlargement, sternotomy, and left-sided defibrillator again noted.    On March 13 of this year there was significant congestive findings.    On has an examination similar findings are seen.    IMPRESSION: Mild CHF unchanged. Stable cardiac findings.    < end of copied text >      DIAGNOSTIC TESTING:    [x ] Echocardiogram: < from: TTE Echo Complete w/o Contrast w/ Doppler (03.14.21 @ 14:40) >  Summary:   1. Left ventricular ejection fraction, by visual estimation, is 35 to 40%.   2. Technically difficult study.   3. Moderately decreased global left ventricular systolic function.   4. Mid and apical anterior wall and mid anteroseptal segment are abnormal as described above.   5. Elevated left ventricular end-diastolic pressure.   6. Normal left ventricular internal cavity size.   7. Spectral Doppler shows pseudonormal pattern of left ventricular myocardial filling (Grade II diastolic dysfunction).   8. Normal right ventricular size and function.   9. Mildly enlarged left atrium.  10. Mildly enlarged right atrium.  11. There is no evidence of pericardial effusion.  12. Mild thickening and calcification of the anterior and posterior mitral valve leaflets.  13. Trace mitral valve regurgitation.  14. Moderate aortic regurgitation.  15. Moderate aortic valve stenosis.  16. Mild pulmonic valve regurgitation.  17. Estimated pulmonary artery systolic pressure is 42.7 mmHg assuming a right atrial pressure of 8 mmHg, which is consistent with mild pulmonary hypertension.  18. Consider limited Definity echocontrast follow up study to better evaluate left ventricular function.  19. Peak transaortic gradient equals 20.8 mmHg, mean transaortic gradient equals 10.2mmHg, the calculated aortic valve area equals 1.17 cm² by the continuity equation consistent with moderate aortic stenosis.    < end of copied text >    [x ]  Catheterization:   [ ] Stress Test: < from: NM Pharm Stress Test, Dual Isotope (03.17.21 @ 12:40) >  IMPRESSION: Abnormal myocardial perfusion scan.    1. There was scintigraphic evidence for a myocardial infarct in the RCA territory with no significant ischemia.    2. There is severely abnormal left ventricular contractility, globally diminished calculated ejection fraction and no wall motion abnormlaities. Overall post stress ejection fraction was 21%    3. Please see the cardiac test report for EKG findings and symptoms during the procedure    < end of copied text >     LABS:	 	    CARDIAC MARKERS:  Troponin I, Serum: .149 ng/mL (07-12 @ 07:33)  Troponin I, Serum: .299 ng/mL (07-11 @ 13:00)  Troponin I, Serum: .187 ng/mL (07-10 @ 23:11)    13 Jul 2021 07:55    140    |  104    |  75     ----------------------------<  361    4.8     |  29     |  2.30   12 Jul 2021 07:33    141    |  104    |  58     ----------------------------<  352    4.5     |  28     |  2.10   11 Jul 2021 00:50    138    |  104    |  38     ----------------------------<  441    4.8     |  25     |  2.00     Ca    8.6        13 Jul 2021 07:55    TPro  6.8    /  Alb  3.3    /  TBili  0.7    /  DBili  x      /  AST  40     /  ALT  72     /  AlkPhos  107    13 Jul 2021 07:55                          13.2   15.70 )-----------( 140      ( 13 Jul 2021 07:55 )             43.4 ,                       13.3   16.72 )-----------( 135      ( 12 Jul 2021 07:33 )             43.6 ,                       13.5   15.55 )-----------( 151      ( 10 Jul 2021 23:11 )             44.2   proBNP: Serum Pro-Brain Natriuretic Peptide: 5027 pg/mL (07-10 @ 23:11)    INR: 2.23 ratio (07-10 @ 23:36)           Patient is a 75y old  Male who presents with a chief complaint of SOB (13 Jul 2021 13:10)    PAST MEDICAL & SURGICAL HISTORY:  HTN (hypertension)    DM (diabetes mellitus), type 2    High cholesterol    CHF (congestive heart failure)    Pneumonia    COPD (chronic obstructive pulmonary disease)    Pacemaker    AICD (automatic cardioverter/defibrillator) present    S/P CABG x 3  cabg3  in 2003    S/P cardiac pacemaker procedure  defibrilator 2012    S/P cardiac cath  cardiac stentin 2011    S/P hernia repair  hernia [id number]    INTERVAL HISTORY: Patient being diuresis with IV Lasix for fluid overload.  	  MEDICATIONS:  MEDICATIONS  (STANDING):  albuterol/ipratropium for Nebulization 3 milliLiter(s) Nebulizer every 6 hours  allopurinol 100 milliGRAM(s) Oral daily  aspirin enteric coated 81 milliGRAM(s) Oral daily  dextrose 40% Gel 15 Gram(s) Oral once  dextrose 5%. 1000 milliLiter(s) (50 mL/Hr) IV Continuous <Continuous>  dextrose 5%. 1000 milliLiter(s) (100 mL/Hr) IV Continuous <Continuous>  dextrose 50% Injectable 25 Gram(s) IV Push once  dextrose 50% Injectable 12.5 Gram(s) IV Push once  dextrose 50% Injectable 25 Gram(s) IV Push once  furosemide   Injectable 40 milliGRAM(s) IV Push two times a day  glucagon  Injectable 1 milliGRAM(s) IntraMuscular once  hydrALAZINE 10 milliGRAM(s) Oral three times a day  insulin glargine Injectable (LANTUS) 17 Unit(s) SubCutaneous at bedtime  insulin lispro (ADMELOG) corrective regimen sliding scale   SubCutaneous three times a day before meals  insulin lispro Injectable (ADMELOG) 8 Unit(s) SubCutaneous three times a day before meals  isosorbide   dinitrate Tablet (ISORDIL) 10 milliGRAM(s) Oral three times a day  methylPREDNISolone sodium succinate Injectable 40 milliGRAM(s) IV Push every 8 hours  metoprolol succinate ER 75 milliGRAM(s) Oral daily  rivaroxaban 15 milliGRAM(s) Oral with dinner  simvastatin 40 milliGRAM(s) Oral at bedtime  tamsulosin 0.4 milliGRAM(s) Oral at bedtime    MEDICATIONS  (PRN):      Vitals:  T(F): 97.4 (07-13-21 @ 16:08), Max: 97.7 (07-13-21 @ 10:26)  HR: 70 (07-13-21 @ 16:08) (69 - 74)  BP: 138/70 (07-13-21 @ 16:08) (124/63 - 140/73)  RR: 18 (07-13-21 @ 10:26) (18 - 18)  SpO2: 97% (07-13-21 @ 16:08) (92% - 98%)  Wt(kg): --114.2kg    Weight (kg): 114 (07-11 @ 07:15)  BMI (kg/m2): 41.8 (07-11 @ 07:15)      PHYSICAL EXAM:  Neuro: Awake, responsive  CV: S1 S2 RRR, AICD  Lungs: Crackles bilaterally  GI: Soft, BS +, ND, NT, obese  Extremities: No edema    TELEMETRY: Paced/PVC  	    ECG:  < from: 12 Lead ECG (07.10.21 @ 22:51) >  Ventricular Rate 80 BPM    Atrial Rate 69 BPM    QRS Duration 198 ms    Q-T Interval 492 ms    QTC Calculation(Bazett) 567 ms    R Axis -36 degrees    T Axis 68 degrees    Diagnosis Line Ventricular-paced rhythm  Biventricular pacemaker detected  Abnormal ECG  When compared with ECG of 13-MAR-2021 14:43,  Vent. rate has increased BY   9 BPM    < end of copied text >    RADIOLOGY: < from: Xray Chest 1 View- PORTABLE-Urgent (Xray Chest 1 View- PORTABLE-Urgent .) (07.11.21 @ 03:36) >  INTERPRETATION:  AP semierect chest on July 11, 2021 at 3:20 AM. Patient is short of breath.    Gross heart enlargement, sternotomy, and left-sided defibrillator again noted.    On March 13 of this year there was significant congestive findings.    On has an examination similar findings are seen.    IMPRESSION: Mild CHF unchanged. Stable cardiac findings.    < end of copied text >      DIAGNOSTIC TESTING:    [x ] Echocardiogram: < from: TTE Echo Complete w/o Contrast w/ Doppler (03.14.21 @ 14:40) >  Summary:   1. Left ventricular ejection fraction, by visual estimation, is 35 to 40%.   2. Technically difficult study.   3. Moderately decreased global left ventricular systolic function.   4. Mid and apical anterior wall and mid anteroseptal segment are abnormal as described above.   5. Elevated left ventricular end-diastolic pressure.   6. Normal left ventricular internal cavity size.   7. Spectral Doppler shows pseudonormal pattern of left ventricular myocardial filling (Grade II diastolic dysfunction).   8. Normal right ventricular size and function.   9. Mildly enlarged left atrium.  10. Mildly enlarged right atrium.  11. There is no evidence of pericardial effusion.  12. Mild thickening and calcification of the anterior and posterior mitral valve leaflets.  13. Trace mitral valve regurgitation.  14. Moderate aortic regurgitation.  15. Moderate aortic valve stenosis.  16. Mild pulmonic valve regurgitation.  17. Estimated pulmonary artery systolic pressure is 42.7 mmHg assuming a right atrial pressure of 8 mmHg, which is consistent with mild pulmonary hypertension.  18. Consider limited Definity echocontrast follow up study to better evaluate left ventricular function.  19. Peak transaortic gradient equals 20.8 mmHg, mean transaortic gradient equals 10.2mmHg, the calculated aortic valve area equals 1.17 cm² by the continuity equation consistent with moderate aortic stenosis.    < end of copied text >    [x ]  Catheterization:   [ ] Stress Test: < from: NM Pharm Stress Test, Dual Isotope (03.17.21 @ 12:40) >  IMPRESSION: Abnormal myocardial perfusion scan.    1. There was scintigraphic evidence for a myocardial infarct in the RCA territory with no significant ischemia.    2. There is severely abnormal left ventricular contractility, globally diminished calculated ejection fraction and no wall motion abnormlaities. Overall post stress ejection fraction was 21%    3. Please see the cardiac test report for EKG findings and symptoms during the procedure    < end of copied text >     LABS:	 	    CARDIAC MARKERS:  Troponin I, Serum: .149 ng/mL (07-12 @ 07:33)  Troponin I, Serum: .299 ng/mL (07-11 @ 13:00)  Troponin I, Serum: .187 ng/mL (07-10 @ 23:11)    13 Jul 2021 07:55    140    |  104    |  75     ----------------------------<  361    4.8     |  29     |  2.30   12 Jul 2021 07:33    141    |  104    |  58     ----------------------------<  352    4.5     |  28     |  2.10   11 Jul 2021 00:50    138    |  104    |  38     ----------------------------<  441    4.8     |  25     |  2.00     Ca    8.6        13 Jul 2021 07:55    TPro  6.8    /  Alb  3.3    /  TBili  0.7    /  DBili  x      /  AST  40     /  ALT  72     /  AlkPhos  107    13 Jul 2021 07:55                          13.2   15.70 )-----------( 140      ( 13 Jul 2021 07:55 )             43.4 ,                       13.3   16.72 )-----------( 135      ( 12 Jul 2021 07:33 )             43.6 ,                       13.5   15.55 )-----------( 151      ( 10 Jul 2021 23:11 )             44.2   proBNP: Serum Pro-Brain Natriuretic Peptide: 5027 pg/mL (07-10 @ 23:11)    INR: 2.23 ratio (07-10 @ 23:36)

## 2021-07-13 NOTE — PROGRESS NOTE ADULT - ASSESSMENT
75M hx DM, HTN, HLD, Obesity, COPD (on home o2 2L), CHF, CAD s/p quadruple bypass with PPM/AICD now with COPD exacerbation along with volume overload. 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 Will continue the lasix 40 mg IV twice a day with strict fluid restriction. Renal sonogram shows some chronicity. Will follow closely.

## 2021-07-13 NOTE — PROGRESS NOTE ADULT - SUBJECTIVE AND OBJECTIVE BOX
INTERVAL HPI:  76yo, BM with HTN, HLD, DM, CAD with CABG+ stents, Diastolic CHF, S/P AICD, COPD (on home o2 2L), Obesity, EMBER on CPAP( reports compliance),  Gout.  Smoked close to 40 years, quit 20 years ago.  Presented  with difficulty breathing x 2 days with acute worsening the day of presentation   EMS  found to be in Respiratory  distress, placed on CPAP and brought to ED.   Found to be hypertensive in field to 180s / 90s with rales in bilateral lung fields.   Given 5 nitroglycerin sprays, which brought bp to 130s/80s.   Admitted with acute on chronic hypoxic hypercarbic Respiratory failure and hypertensive urgency.    OVERNIGHT EVENTS:  Awake and comfortable.    Vital Signs Last 24 Hrs  T(C): 36.3 (13 Jul 2021 16:08), Max: 36.5 (13 Jul 2021 10:26)  T(F): 97.4 (13 Jul 2021 16:08), Max: 97.7 (13 Jul 2021 10:26)  HR: 77 (13 Jul 2021 21:10) (69 - 77)  BP: 147/70 (13 Jul 2021 21:10) (124/63 - 147/70)  BP(mean): --  RR: 18 (13 Jul 2021 10:26) (18 - 18)  SpO2: 93% (13 Jul 2021 20:57) (92% - 98%)    PHYSICAL EXAM:  GEN:         Awake, responsive and comfortable.  HEENT:    Normal.    RESP:        no distress  CVS:          Regular rate and rhythm.   ABD:         Soft, non-tender, non-distended;     MEDICATIONS  (STANDING):  albuterol/ipratropium for Nebulization 3 milliLiter(s) Nebulizer every 6 hours  allopurinol 100 milliGRAM(s) Oral daily  aspirin enteric coated 81 milliGRAM(s) Oral daily  dextrose 40% Gel 15 Gram(s) Oral once  dextrose 5%. 1000 milliLiter(s) (50 mL/Hr) IV Continuous <Continuous>  dextrose 5%. 1000 milliLiter(s) (100 mL/Hr) IV Continuous <Continuous>  dextrose 50% Injectable 25 Gram(s) IV Push once  dextrose 50% Injectable 12.5 Gram(s) IV Push once  dextrose 50% Injectable 25 Gram(s) IV Push once  furosemide   Injectable 40 milliGRAM(s) IV Push two times a day  glucagon  Injectable 1 milliGRAM(s) IntraMuscular once  hydrALAZINE 10 milliGRAM(s) Oral three times a day  insulin glargine Injectable (LANTUS) 30 Unit(s) SubCutaneous at bedtime  insulin lispro (ADMELOG) corrective regimen sliding scale   SubCutaneous three times a day before meals  isosorbide   dinitrate Tablet (ISORDIL) 10 milliGRAM(s) Oral three times a day  methylPREDNISolone sodium succinate Injectable 40 milliGRAM(s) IV Push every 8 hours  metoprolol succinate ER 75 milliGRAM(s) Oral daily  rivaroxaban 15 milliGRAM(s) Oral with dinner  simvastatin 40 milliGRAM(s) Oral at bedtime  tamsulosin 0.4 milliGRAM(s) Oral at bedtime    LABS:                        13.2   15.70 )-----------( 140      ( 13 Jul 2021 07:55 )             43.4     07-13    140  |  104  |  75<H>  ----------------------------<  361<H>  4.8   |  29  |  2.30<H>    Ca    8.6      13 Jul 2021 07:55    TPro  6.8  /  Alb  3.3  /  TBili  0.7  /  DBili  x   /  AST  40<H>  /  ALT  72  /  AlkPhos  107  07-13    07-12 @ 06:29  pH: 7.39  pCO2: 50  pO2: 85  SaO2: 96  07-10 @ 23:11  pH: 7.38  pCO2: 49  pO2: 139  SaO2: 99  ASSESSMENT AND PLAN:  ·	Acute on chronic hypoxic Respiratory failure.  ·	Acute on chronic systolic + diastolic CHF.  ·	Aortic Regurgitation+ Stenosis.  ·	Acute COPD exacerbation.  ·	Leukocytosis.  ·	Renal Insuffiencey.  ·	CAD with CABG+ stents.  ·	S/P AICD.  ·	Obesity.  ·	EMBER.  ·	HTN.  ·	HLD.  ·	DM.  ·	Gout.    Will reduce steroids.  Clinically improving.  Continue O2, nocturnal BIPAP.  On diuretics and nebulizer.

## 2021-07-13 NOTE — PHYSICAL THERAPY INITIAL EVALUATION ADULT - BALANCE TRAINING, PT EVAL
Pt will improve static & dynamic standing balance to Good using no AD with cont O2 without c/o SOB /MARR  to perform ADL, Gait independently  in 2 weeks

## 2021-07-13 NOTE — CONSULT NOTE ADULT - SUBJECTIVE AND OBJECTIVE BOX
Patient is a 75y old  Male who presents with a chief complaint of Dyspnea (13 Jul 2021 16:33)      Reason For Consult: steroid induced hyperglycemia     HPI:  75M hx DM, HTN, HLD, Obesity, COPD (on home o2 2L), CHF, CAD s/p quadruple bypass with PPM/AICD, presenting with difficulty breathing x 2 days with acute worsening this afternoon. EMS called, pt found to be in resp distress; pt placed on cpap and brought to ED. Found to be hypertensive in field to 180s / 90s with rales in bilateral lung fields. Given 5 nitroglycerin sprays, brought bp to 130s/80s.      (11 Jul 2021 03:00)  Endocrine History HbA1c 8.6 , thus most of the hyperglycemia secondary to the steroids  Currently on 20 units of Lantus and 8 units of prandial lispro   Finger sticks are  in the 400s son above    PAST MEDICAL & SURGICAL HISTORY:  HTN (hypertension)    DM (diabetes mellitus), type 2    High cholesterol    CHF (congestive heart failure)    Pneumonia    COPD (chronic obstructive pulmonary disease)    Pacemaker    AICD (automatic cardioverter/defibrillator) present    S/P CABG x 3  cabg3  in 2003    S/P cardiac pacemaker procedure  defibrilator 2012    S/P cardiac cath  cardiac stentin 2011    S/P hernia repair  hernia [id number]        FAMILY HISTORY:  No pertinent family history in first degree relatives          Social History:    MEDICATIONS  (STANDING):  albuterol/ipratropium for Nebulization 3 milliLiter(s) Nebulizer every 6 hours  allopurinol 100 milliGRAM(s) Oral daily  aspirin enteric coated 81 milliGRAM(s) Oral daily  dextrose 40% Gel 15 Gram(s) Oral once  dextrose 5%. 1000 milliLiter(s) (50 mL/Hr) IV Continuous <Continuous>  dextrose 5%. 1000 milliLiter(s) (100 mL/Hr) IV Continuous <Continuous>  dextrose 50% Injectable 25 Gram(s) IV Push once  dextrose 50% Injectable 12.5 Gram(s) IV Push once  dextrose 50% Injectable 25 Gram(s) IV Push once  furosemide   Injectable 40 milliGRAM(s) IV Push two times a day  glucagon  Injectable 1 milliGRAM(s) IntraMuscular once  hydrALAZINE 10 milliGRAM(s) Oral three times a day  insulin glargine Injectable (LANTUS) 30 Unit(s) SubCutaneous at bedtime  insulin lispro (ADMELOG) corrective regimen sliding scale   SubCutaneous three times a day before meals  isosorbide   dinitrate Tablet (ISORDIL) 10 milliGRAM(s) Oral three times a day  methylPREDNISolone sodium succinate Injectable 40 milliGRAM(s) IV Push every 8 hours  metoprolol succinate ER 75 milliGRAM(s) Oral daily  rivaroxaban 15 milliGRAM(s) Oral with dinner  simvastatin 40 milliGRAM(s) Oral at bedtime  tamsulosin 0.4 milliGRAM(s) Oral at bedtime    MEDICATIONS  (PRN):      REVIEW OF SYSTEMS: mild shortness of breath  CONSTITUTIONAL:  as per HPI  HEENT:  Eyes:  No diplopia or blurred vision. ENT:  No earache, sore throat or runny nose.  CARDIOVASCULAR:  No pressure, squeezing, strangling, tightness, heaviness or aching about the chest, neck, axilla or epigastrium.  RESPIRATORY:  shortness of breath  GASTROINTESTINAL:  No nausea, vomiting or diarrhea.  GENITOURINARY:  No dysuria, frequency or urgency. No Blood in urine  MUSCULOSKELETAL:  no joint aches, no muscle pain, myalgia  SKIN:  No change in skin, hair or nails.  NEUROLOGIC:  No paresthesias, fasciculations, seizures or weakness.  PSYCHIATRIC:  No disorder of thought or mood.  ENDOCRINE:  No heat or cold intolerance, polyuria or polydipsia. abnormal weight gain or loss, oral thrush  HEMATOLOGICAL:  No easy bruising or bleeding.     T(C): 36.3 (07-13-21 @ 16:08), Max: 36.5 (07-13-21 @ 10:26)  HR: 77 (07-13-21 @ 21:10) (69 - 77)  BP: 147/70 (07-13-21 @ 21:10) (124/63 - 147/70)  RR: 18 (07-13-21 @ 10:26) (18 - 18)  SpO2: 93% (07-13-21 @ 20:57) (92% - 98%)  Wt(kg): --    PHYSICAL EXAM:  GENERAL: NAD, well-groomed, well-developed  HEAD:  Atraumatic, Normocephalic  EYES: EOMI, PERRLA, conjunctiva and sclera clear  ENMT: No tonsillar erythema, exudates, or enlargement; Moist mucous membranes, Good dentition, No lesions  NECK: Supple, No JVD, Normal thyroid  NERVOUS SYSTEM:  Alert & Oriented X3, Good concentration; Motor Strength 5/5 B/L upper and lower extremities; DTRs 2+ intact and symmetric  CHEST/LUNG: as per the progress notes of Primary Team   HEART: Regular rate and rhythm; No murmurs, rubs, or gallops  ABDOMEN: Soft, Nontender, Nondistended; Bowel sounds present  EXTREMITIES:  2+ Peripheral Pulses, No clubbing, cyanosis, or edema  LYMPH: No lymphadenopathy noted  SKIN: No rashes or lesions    CAPILLARY BLOOD GLUCOSE      POCT Blood Glucose.: 416 mg/dL (13 Jul 2021 20:37)  POCT Blood Glucose.: 492 mg/dL (13 Jul 2021 19:11)  POCT Blood Glucose.: 487 mg/dL (13 Jul 2021 18:44)  POCT Blood Glucose.: 533 mg/dL (13 Jul 2021 18:00)  POCT Blood Glucose.: 530 mg/dL (13 Jul 2021 16:40)  POCT Blood Glucose.: 535 mg/dL (13 Jul 2021 16:36)  POCT Blood Glucose.: 423 mg/dL (13 Jul 2021 12:25)  POCT Blood Glucose.: 323 mg/dL (13 Jul 2021 07:54)                            13.2   15.70 )-----------( 140      ( 13 Jul 2021 07:55 )             43.4       CMP:  07-13 @ 07:55  SGPT 72  Albumin 3.3   Alk Phos 107   Anion Gap 7   SGOT 40   Total Bili 0.7   BUN 75   Calcium Total 8.6   CO2 29   Chloride 104   Creatinine 2.30   eGFR if AA 31   eGFR if non AA 26   Glucose 361   Potassium 4.8   Protein 6.8   Sodium 140      Thyroid Function Tests:      Diabetes Tests:     Parathyroids:     Adrenals:       Radiology:

## 2021-07-13 NOTE — PHYSICAL THERAPY INITIAL EVALUATION ADULT - WEIGHT-BEARING RESTRICTIONS: SIT/STAND, REHAB EVAL
3y5m F BIB parents with no significant PMHx presents to the ED with fever, vomiting, and diarrhea since yesterday afternoon. Pt began vomiting at school yesterday. +sick contact with siblings with similar symptoms. Immunizations UTD. NKDA. full weight-bearing

## 2021-07-13 NOTE — PROGRESS NOTE ADULT - SUBJECTIVE AND OBJECTIVE BOX
JO VARELA  MRN-[id number]    Interval History: the pt was seen and examined earlier, no distress, on Bipap, no new complains. The pt is afebrile, WBC 15.7, Cr increased 2.3.     PAST MEDICAL & SURGICAL HISTORY:  HTN (hypertension)    DM (diabetes mellitus), type 2    High cholesterol    CHF (congestive heart failure)    Pneumonia    COPD (chronic obstructive pulmonary disease)    Pacemaker    AICD (automatic cardioverter/defibrillator) present    S/P CABG x 3  cabg3  in 2003    S/P cardiac pacemaker procedure  defibrilator 2012    S/P cardiac cath  cardiac stentin 2011    S/P hernia repair  hernia [id number]        ROS:    [ ] Unobtainable because:  [x ] All other systems negative    Constitutional: no fever, no chills  Head: no trauma  Eyes: no vision changes, no eye pain  ENT:  no sore throat, no rhinorrhea  Cardiovascular:  no chest pain, no palpitation  Respiratory:  + SOB, + cough  GI:  no abd pain, no vomiting, no diarrhea  urinary: no dysuria, no hematuria, no flank pain  musculoskeletal:  no joint pain, no joint swelling  skin:  no rash  neurology:  no headache, no seizure, no change in mental status  psych: no anxiety, no depression         Allergies  No Known Allergies        ANTIMICROBIALS:      OTHER MEDS:  albuterol/ipratropium for Nebulization 3 milliLiter(s) Nebulizer every 6 hours  allopurinol 100 milliGRAM(s) Oral daily  aspirin enteric coated 81 milliGRAM(s) Oral daily  dextrose 40% Gel 15 Gram(s) Oral once  dextrose 5%. 1000 milliLiter(s) IV Continuous <Continuous>  dextrose 5%. 1000 milliLiter(s) IV Continuous <Continuous>  dextrose 50% Injectable 25 Gram(s) IV Push once  dextrose 50% Injectable 12.5 Gram(s) IV Push once  dextrose 50% Injectable 25 Gram(s) IV Push once  furosemide   Injectable 40 milliGRAM(s) IV Push two times a day  glucagon  Injectable 1 milliGRAM(s) IntraMuscular once  hydrALAZINE 10 milliGRAM(s) Oral three times a day  insulin glargine Injectable (LANTUS) 17 Unit(s) SubCutaneous at bedtime  insulin lispro (ADMELOG) corrective regimen sliding scale   SubCutaneous three times a day before meals  insulin lispro Injectable (ADMELOG) 8 Unit(s) SubCutaneous three times a day before meals  isosorbide   dinitrate Tablet (ISORDIL) 10 milliGRAM(s) Oral three times a day  methylPREDNISolone sodium succinate Injectable 40 milliGRAM(s) IV Push every 8 hours  metoprolol succinate ER 75 milliGRAM(s) Oral daily  rivaroxaban 15 milliGRAM(s) Oral with dinner  simvastatin 40 milliGRAM(s) Oral at bedtime  tamsulosin 0.4 milliGRAM(s) Oral at bedtime      Vital Signs Last 24 Hrs  T(C): 36.3 (13 Jul 2021 16:08), Max: 36.5 (13 Jul 2021 10:26)  T(F): 97.4 (13 Jul 2021 16:08), Max: 97.7 (13 Jul 2021 10:26)  HR: 70 (13 Jul 2021 16:35) (69 - 74)  BP: 138/70 (13 Jul 2021 16:08) (124/63 - 140/73)  BP(mean): --  RR: 18 (13 Jul 2021 10:26) (18 - 18)  SpO2: 97% (13 Jul 2021 16:35) (92% - 98%)    Physical Exam:  Constitutional: non-toxic, no distress, on Bipap able to speak quite well through the mask  HEAD/EYES: anicteric, no conjunctival injection  ENT:  supple, no thrush  Cardiovascular:   normal S1, S2, no murmur, no edema, +cardiac device palpated  Respiratory:  diminished BS bilaterally, no wheezes, no rales  GI:  soft, non-tender, normal bowel sounds, obese and mildly distended   :  no ro, no CVA tenderness  Musculoskeletal:  no synovitis, normal ROM  Neurologic: awake and alert, normal strength, no focal findings  Skin:  no rash, no erythema, no phlebitis, s/p CABG, old graft harvest site on LLE   Heme/Onc: no lymphadenopathy   Psychiatric:  awake, alert, appropriate mood    WBC Count: 15.70 K/uL (07-13 @ 07:55)  WBC Count: 16.72 K/uL (07-12 @ 07:33)  WBC Count: 15.55 K/uL (07-10 @ 23:11)                            13.2   15.70 )-----------( 140      ( 13 Jul 2021 07:55 )             43.4       07-13    140  |  104  |  75<H>  ----------------------------<  361<H>  4.8   |  29  |  2.30<H>    Ca    8.6      13 Jul 2021 07:55    TPro  6.8  /  Alb  3.3  /  TBili  0.7  /  DBili  x   /  AST  40<H>  /  ALT  72  /  AlkPhos  107  07-13          Creatinine Trend: 2.30<--, 2.10<--, 2.00<--      MICROBIOLOGY:  v      Rapid RVP Result: NotDetec (07-11 @ 02:51)    SARS-CoV-2: NotDetec (11 Jul 2021 02:51)  SARS-CoV-2: NotDetec (13 Mar 2021 17:23)    RADIOLOGY:     JO VARELA  MRN-[id number]    Interval History: the pt was seen and examined earlier, no distress, on Bipap, no new complains. The pt is afebrile, WBC 15.7, Cr increased 2.3.     PAST MEDICAL & SURGICAL HISTORY:  HTN (hypertension)    DM (diabetes mellitus), type 2    High cholesterol    CHF (congestive heart failure)    Pneumonia    COPD (chronic obstructive pulmonary disease)    Pacemaker    AICD (automatic cardioverter/defibrillator) present    S/P CABG x 3  cabg3  in 2003    S/P cardiac pacemaker procedure  defibrilator 2012    S/P cardiac cath  cardiac stentin 2011    S/P hernia repair  hernia [id number]        ROS:    [ ] Unobtainable because:  [x ] All other systems negative    Constitutional: no fever, no chills  Head: no trauma  Eyes: no vision changes, no eye pain  ENT:  no sore throat, no rhinorrhea  Cardiovascular:  no chest pain, no palpitation  Respiratory:  + SOB, + cough  GI:  no abd pain, no vomiting, no diarrhea  urinary: no dysuria, no hematuria, no flank pain  musculoskeletal:  no joint pain, no joint swelling  skin:  no rash  neurology:  no headache, no seizure, no change in mental status  psych: no anxiety, no depression         Allergies  No Known Allergies        ANTIMICROBIALS:      OTHER MEDS:  albuterol/ipratropium for Nebulization 3 milliLiter(s) Nebulizer every 6 hours  allopurinol 100 milliGRAM(s) Oral daily  aspirin enteric coated 81 milliGRAM(s) Oral daily  dextrose 40% Gel 15 Gram(s) Oral once  dextrose 5%. 1000 milliLiter(s) IV Continuous <Continuous>  dextrose 5%. 1000 milliLiter(s) IV Continuous <Continuous>  dextrose 50% Injectable 25 Gram(s) IV Push once  dextrose 50% Injectable 12.5 Gram(s) IV Push once  dextrose 50% Injectable 25 Gram(s) IV Push once  furosemide   Injectable 40 milliGRAM(s) IV Push two times a day  glucagon  Injectable 1 milliGRAM(s) IntraMuscular once  hydrALAZINE 10 milliGRAM(s) Oral three times a day  insulin glargine Injectable (LANTUS) 17 Unit(s) SubCutaneous at bedtime  insulin lispro (ADMELOG) corrective regimen sliding scale   SubCutaneous three times a day before meals  insulin lispro Injectable (ADMELOG) 8 Unit(s) SubCutaneous three times a day before meals  isosorbide   dinitrate Tablet (ISORDIL) 10 milliGRAM(s) Oral three times a day  methylPREDNISolone sodium succinate Injectable 40 milliGRAM(s) IV Push every 8 hours  metoprolol succinate ER 75 milliGRAM(s) Oral daily  rivaroxaban 15 milliGRAM(s) Oral with dinner  simvastatin 40 milliGRAM(s) Oral at bedtime  tamsulosin 0.4 milliGRAM(s) Oral at bedtime      Vital Signs Last 24 Hrs  T(C): 36.3 (13 Jul 2021 16:08), Max: 36.5 (13 Jul 2021 10:26)  T(F): 97.4 (13 Jul 2021 16:08), Max: 97.7 (13 Jul 2021 10:26)  HR: 70 (13 Jul 2021 16:35) (69 - 74)  BP: 138/70 (13 Jul 2021 16:08) (124/63 - 140/73)  BP(mean): --  RR: 18 (13 Jul 2021 10:26) (18 - 18)  SpO2: 97% (13 Jul 2021 16:35) (92% - 98%)    Physical Exam:  Constitutional: non-toxic, no distress, on Bipap able to speak quite well through the mask  HEAD/EYES: anicteric, no conjunctival injection  ENT:  supple, no thrush  Cardiovascular:   normal S1, S2, no murmur, no edema, +cardiac device palpated  Respiratory:  diminished BS bilaterally, no wheezes, no rales  GI:  soft, non-tender, normal bowel sounds, obese and mildly distended   :  no ro, no CVA tenderness  Musculoskeletal:  no synovitis, normal ROM  Neurologic: awake and alert, normal strength, no focal findings  Skin:  no rash, no erythema, no phlebitis, s/p CABG, old graft harvest site on LLE   Psychiatric:  awake, alert, appropriate mood    WBC Count: 15.70 K/uL (07-13 @ 07:55)  WBC Count: 16.72 K/uL (07-12 @ 07:33)  WBC Count: 15.55 K/uL (07-10 @ 23:11)                            13.2   15.70 )-----------( 140      ( 13 Jul 2021 07:55 )             43.4       07-13    140  |  104  |  75<H>  ----------------------------<  361<H>  4.8   |  29  |  2.30<H>    Ca    8.6      13 Jul 2021 07:55    TPro  6.8  /  Alb  3.3  /  TBili  0.7  /  DBili  x   /  AST  40<H>  /  ALT  72  /  AlkPhos  107  07-13          Creatinine Trend: 2.30<--, 2.10<--, 2.00<--      MICROBIOLOGY:    Rapid RVP Result: NotDetec (07-11 @ 02:51)    SARS-CoV-2: NotDetec (11 Jul 2021 02:51)  SARS-CoV-2: NotDetec (13 Mar 2021 17:23)    RADIOLOGY:    < from: TTE Echo Complete w/o Contrast w/ Doppler (07.13.21 @ 10:39) >  Summary:   1. Left ventricular ejection fraction,by visual estimation, is 40 to 45%.   2. Technically difficult study.   3. Mildly decreased global left ventricular systolic function.   4. Mid and apical anterior wall, mid and apical anterior septum, mid and apical inferior septum, and mid anterolateral segment are abnormal as described above.   5. Mildly increased left ventricular internal cavity size.   6. The left ventricular diastolic function could not be assessed in this study.   7. There is mild concentric left ventricular hypertrophy.  8. Mildly enlarged right ventricle.   9. Mildly reduced RV systolic function.  10. Normal left atrial size.  11. Normal right atrial size.  12. There is no evidence of pericardial effusion.  13. Mild mitral annular calcification.  14. Mild thickeningand calcification of the anterior and posterior mitral valve leaflets.  15. Trace mitral valve regurgitation.  16. Mild tricuspid regurgitation.  17. Moderate aortic regurgitation.  18. Moderate to severe aortic valve stenosis.  19. Mild pulmonic valve regurgitation.  20. C/w the study of 3-14-21, findings are similar.  21. There is mild aortic root calcification.    < end of copied text >

## 2021-07-13 NOTE — PROGRESS NOTE ADULT - ASSESSMENT
75MM hx DM, HTN, HLD, Obesity, COPD (on home o2 2L), CHF, CAD s/p quadruple bypass with PPM/AICD, presenting with difficulty breathing x 2 days with acute worsening this afternoon. EMS called, pt found to be in resp distress; pt placed on cpap and brought to ED. Found to be hypertensive in field to 180s / 90s with rales in bilateral lung fields. Given 5 nitroglycerin sprays, brought bp to 130s/80s.     7/13: no signs of acute infection, pt's symptoms most likely related to COPD exacerbation, no role for abx at this time. The pt has no fever, WBC elevated, on steroids.     Leukocytosis  Acute on chronic hypercarbic respiratory failure  COPD exacerbation  Acute on chronic combined systolic and diastolic CHF  DM with hyperglycemia  CKD stage 3  Chronic A fib    Suggest:   ·	monitor off antibiotics   ·	trend WBC daily  ·	Would suggest hem/onc consult to look for other causes of elevated WBC  ·	HbA1c 8.6  ·	Continue current insulin regimen, monitor to make further adjustment   ·	Cr stable at baseline and avoid nephrotoxins   ·	Rate controlled, on BB  ·	Continue Xarelto ( renally dosed)  ·	will sign off, re-consult as needed     Msg sent to Dr. Flor  Discussed with Dr. Guadalupe

## 2021-07-13 NOTE — PHYSICAL THERAPY INITIAL EVALUATION ADULT - TRANSFER TRAINING, PT EVAL
Pt will be able to perform sit to stand, stand pivot transfer using no AD with cont O2 without c/o SOB /MARR independently in 2 to 3 days

## 2021-07-13 NOTE — PROGRESS NOTE ADULT - ATTENDING COMMENTS
remains on bipap and intermittently on oxygen which is what he has at home as well   no signs of infections and being monitored off antibiotics   continue excellent care by pulmonary     Will sign off. Please call with questions.     Gonzalo Guadalupe DO  Infectious Disease Attending  Pager [phone]  After 5pm/weekends please call [phone] for all inquiries and new consults

## 2021-07-13 NOTE — PHYSICAL THERAPY INITIAL EVALUATION ADULT - PLANNED THERAPY INTERVENTIONS, PT EVAL
Pt will be able to negotiate 20 steps using bilateral rails , with cont O2 without c/o SOB /MARR independently in 1 week/balance training/bed mobility training/gait training/strengthening/transfer training

## 2021-07-14 ENCOUNTER — TRANSCRIPTION ENCOUNTER (OUTPATIENT)
Age: 76
End: 2021-07-14

## 2021-07-14 LAB
ALBUMIN SERPL ELPH-MCNC: 3.1 G/DL — LOW (ref 3.3–5)
ALP SERPL-CCNC: 100 U/L — SIGNIFICANT CHANGE UP (ref 40–120)
ALT FLD-CCNC: 72 U/L — SIGNIFICANT CHANGE UP (ref 12–78)
ANION GAP SERPL CALC-SCNC: 7 MMOL/L — SIGNIFICANT CHANGE UP (ref 5–17)
AST SERPL-CCNC: 46 U/L — HIGH (ref 15–37)
BASOPHILS # BLD AUTO: 0.02 K/UL — SIGNIFICANT CHANGE UP (ref 0–0.2)
BASOPHILS NFR BLD AUTO: 0.1 % — SIGNIFICANT CHANGE UP (ref 0–2)
BILIRUB DIRECT SERPL-MCNC: 0.19 MG/DL — SIGNIFICANT CHANGE UP (ref 0.05–0.2)
BILIRUB INDIRECT FLD-MCNC: 0.4 MG/DL — SIGNIFICANT CHANGE UP (ref 0.2–1)
BILIRUB SERPL-MCNC: 0.6 MG/DL — SIGNIFICANT CHANGE UP (ref 0.2–1.2)
BUN SERPL-MCNC: 73 MG/DL — HIGH (ref 7–23)
CALCIUM SERPL-MCNC: 8.4 MG/DL — LOW (ref 8.5–10.1)
CHLORIDE SERPL-SCNC: 104 MMOL/L — SIGNIFICANT CHANGE UP (ref 96–108)
CO2 SERPL-SCNC: 30 MMOL/L — SIGNIFICANT CHANGE UP (ref 22–31)
CREAT SERPL-MCNC: 2.19 MG/DL — HIGH (ref 0.5–1.3)
EOSINOPHIL # BLD AUTO: 0 K/UL — SIGNIFICANT CHANGE UP (ref 0–0.5)
EOSINOPHIL NFR BLD AUTO: 0 % — SIGNIFICANT CHANGE UP (ref 0–6)
GLUCOSE BLDC GLUCOMTR-MCNC: 208 MG/DL — HIGH (ref 70–99)
GLUCOSE BLDC GLUCOMTR-MCNC: 272 MG/DL — HIGH (ref 70–99)
GLUCOSE BLDC GLUCOMTR-MCNC: 333 MG/DL — HIGH (ref 70–99)
GLUCOSE BLDC GLUCOMTR-MCNC: 381 MG/DL — HIGH (ref 70–99)
GLUCOSE SERPL-MCNC: 200 MG/DL — HIGH (ref 70–99)
HCT VFR BLD CALC: 43.7 % — SIGNIFICANT CHANGE UP (ref 39–50)
HGB BLD-MCNC: 13.6 G/DL — SIGNIFICANT CHANGE UP (ref 13–17)
IMM GRANULOCYTES NFR BLD AUTO: 0.9 % — SIGNIFICANT CHANGE UP (ref 0–1.5)
LYMPHOCYTES # BLD AUTO: 0.45 K/UL — LOW (ref 1–3.3)
LYMPHOCYTES # BLD AUTO: 2.8 % — LOW (ref 13–44)
MAGNESIUM SERPL-MCNC: 2.5 MG/DL — SIGNIFICANT CHANGE UP (ref 1.6–2.6)
MCHC RBC-ENTMCNC: 30.2 PG — SIGNIFICANT CHANGE UP (ref 27–34)
MCHC RBC-ENTMCNC: 31.1 GM/DL — LOW (ref 32–36)
MCV RBC AUTO: 97.1 FL — SIGNIFICANT CHANGE UP (ref 80–100)
MONOCYTES # BLD AUTO: 0.37 K/UL — SIGNIFICANT CHANGE UP (ref 0–0.9)
MONOCYTES NFR BLD AUTO: 2.3 % — SIGNIFICANT CHANGE UP (ref 2–14)
NEUTROPHILS # BLD AUTO: 15.25 K/UL — HIGH (ref 1.8–7.4)
NEUTROPHILS NFR BLD AUTO: 93.9 % — HIGH (ref 43–77)
NRBC # BLD: 0 /100 WBCS — SIGNIFICANT CHANGE UP (ref 0–0)
NT-PROBNP SERPL-SCNC: 6856 PG/ML — HIGH (ref 0–450)
PHOSPHATE SERPL-MCNC: 4.4 MG/DL — SIGNIFICANT CHANGE UP (ref 2.5–4.5)
PLATELET # BLD AUTO: 147 K/UL — LOW (ref 150–400)
POTASSIUM SERPL-MCNC: 4.3 MMOL/L — SIGNIFICANT CHANGE UP (ref 3.5–5.3)
POTASSIUM SERPL-SCNC: 4.3 MMOL/L — SIGNIFICANT CHANGE UP (ref 3.5–5.3)
PROT SERPL-MCNC: 6.9 GM/DL — SIGNIFICANT CHANGE UP (ref 6–8.3)
RBC # BLD: 4.5 M/UL — SIGNIFICANT CHANGE UP (ref 4.2–5.8)
RBC # FLD: 16.3 % — HIGH (ref 10.3–14.5)
SODIUM SERPL-SCNC: 141 MMOL/L — SIGNIFICANT CHANGE UP (ref 135–145)
WBC # BLD: 16.73 K/UL — HIGH (ref 3.8–10.5)
WBC # FLD AUTO: 16.73 K/UL — HIGH (ref 3.8–10.5)

## 2021-07-14 PROCEDURE — 99233 SBSQ HOSP IP/OBS HIGH 50: CPT

## 2021-07-14 RX ORDER — BUDESONIDE AND FORMOTEROL FUMARATE DIHYDRATE 160; 4.5 UG/1; UG/1
2 AEROSOL RESPIRATORY (INHALATION)
Refills: 0 | Status: DISCONTINUED | OUTPATIENT
Start: 2021-07-14 | End: 2021-07-15

## 2021-07-14 RX ADMIN — Medication 10 UNIT(S): at 07:48

## 2021-07-14 RX ADMIN — Medication 40 MILLIGRAM(S): at 18:02

## 2021-07-14 RX ADMIN — Medication 81 MILLIGRAM(S): at 11:16

## 2021-07-14 RX ADMIN — Medication 40 MILLIGRAM(S): at 05:55

## 2021-07-14 RX ADMIN — SIMVASTATIN 40 MILLIGRAM(S): 20 TABLET, FILM COATED ORAL at 21:32

## 2021-07-14 RX ADMIN — ISOSORBIDE DINITRATE 10 MILLIGRAM(S): 5 TABLET ORAL at 11:16

## 2021-07-14 RX ADMIN — Medication 3 MILLILITER(S): at 05:25

## 2021-07-14 RX ADMIN — Medication 10 MILLIGRAM(S): at 21:32

## 2021-07-14 RX ADMIN — Medication 20 MILLIGRAM(S): at 05:55

## 2021-07-14 RX ADMIN — Medication 3 MILLILITER(S): at 17:19

## 2021-07-14 RX ADMIN — Medication 5: at 18:06

## 2021-07-14 RX ADMIN — Medication 100 MILLIGRAM(S): at 11:16

## 2021-07-14 RX ADMIN — ISOSORBIDE DINITRATE 10 MILLIGRAM(S): 5 TABLET ORAL at 05:55

## 2021-07-14 RX ADMIN — Medication 20 MILLIGRAM(S): at 21:32

## 2021-07-14 RX ADMIN — Medication 3 MILLILITER(S): at 11:18

## 2021-07-14 RX ADMIN — Medication 10 MILLIGRAM(S): at 05:55

## 2021-07-14 RX ADMIN — Medication 3 MILLILITER(S): at 00:17

## 2021-07-14 RX ADMIN — Medication 3: at 11:16

## 2021-07-14 RX ADMIN — Medication 20 MILLIGRAM(S): at 13:50

## 2021-07-14 RX ADMIN — Medication 75 MILLIGRAM(S): at 05:54

## 2021-07-14 RX ADMIN — TAMSULOSIN HYDROCHLORIDE 0.4 MILLIGRAM(S): 0.4 CAPSULE ORAL at 21:32

## 2021-07-14 RX ADMIN — Medication 10 UNIT(S): at 18:05

## 2021-07-14 RX ADMIN — INSULIN GLARGINE 30 UNIT(S): 100 INJECTION, SOLUTION SUBCUTANEOUS at 21:34

## 2021-07-14 RX ADMIN — ISOSORBIDE DINITRATE 10 MILLIGRAM(S): 5 TABLET ORAL at 21:32

## 2021-07-14 RX ADMIN — RIVAROXABAN 15 MILLIGRAM(S): KIT at 18:02

## 2021-07-14 RX ADMIN — Medication 10 MILLIGRAM(S): at 13:51

## 2021-07-14 RX ADMIN — Medication 10 UNIT(S): at 11:16

## 2021-07-14 RX ADMIN — Medication 2: at 07:47

## 2021-07-14 RX ADMIN — Medication 3 MILLILITER(S): at 23:51

## 2021-07-14 NOTE — DISCHARGE NOTE NURSING/CASE MANAGEMENT/SOCIAL WORK - PATIENT PORTAL LINK FT
You can access the FollowMyHealth Patient Portal offered by [facility] by registering at the following website: [email or web address]. By joining [facility]’s FollowMyHealth portal, you will also be able to view your health information using other applications (apps) compatible with our system.

## 2021-07-14 NOTE — PROGRESS NOTE ADULT - SUBJECTIVE AND OBJECTIVE BOX
INTERVAL HPI:  76yo, BM with HTN, HLD, DM, CAD with CABG+ stents, Diastolic CHF, S/P AICD, COPD (on home o2 2L), Obesity, EMBER on CPAP( reports compliance),  Gout.  Smoked close to 40 years, quit 20 years ago.  Presented  with difficulty breathing x 2 days with acute worsening the day of presentation   EMS  found to be in Respiratory  distress, placed on CPAP and brought to ED.   Found to be hypertensive in field to 180s / 90s with rales in bilateral lung fields.   Given 5 nitroglycerin sprays, which brought bp to 130s/80s.   Admitted with acute on chronic hypoxic hypercarbic Respiratory failure and hypertensive urgency.    OVERNIGHT EVENTS:  Feels better.    Vital Signs Last 24 Hrs  T(C): 36.6 (14 Jul 2021 16:13), Max: 36.6 (14 Jul 2021 10:50)  T(F): 97.9 (14 Jul 2021 16:13), Max: 97.9 (14 Jul 2021 16:13)  HR: 76 (14 Jul 2021 17:00) (70 - 79)  BP: 156/79 (14 Jul 2021 16:13) (105/69 - 156/79)  BP(mean): --  RR: 18 (14 Jul 2021 16:13) (18 - 18)  SpO2: 96% (14 Jul 2021 17:00) (88% - 100%)    PHYSICAL EXAM:  GEN:         Awake, responsive and comfortable.  HEENT:    Normal.    RESP:       no distress  CVS:          Regular rate and rhythm.   ABD:         Soft, non-tender, non-distended;     MEDICATIONS  (STANDING):  albuterol/ipratropium for Nebulization 3 milliLiter(s) Nebulizer every 6 hours  allopurinol 100 milliGRAM(s) Oral daily  aspirin enteric coated 81 milliGRAM(s) Oral daily  dextrose 40% Gel 15 Gram(s) Oral once  dextrose 5%. 1000 milliLiter(s) (50 mL/Hr) IV Continuous <Continuous>  dextrose 5%. 1000 milliLiter(s) (100 mL/Hr) IV Continuous <Continuous>  dextrose 50% Injectable 25 Gram(s) IV Push once  dextrose 50% Injectable 12.5 Gram(s) IV Push once  dextrose 50% Injectable 25 Gram(s) IV Push once  furosemide   Injectable 40 milliGRAM(s) IV Push two times a day  glucagon  Injectable 1 milliGRAM(s) IntraMuscular once  hydrALAZINE 10 milliGRAM(s) Oral three times a day  insulin glargine Injectable (LANTUS) 30 Unit(s) SubCutaneous at bedtime  insulin lispro (ADMELOG) corrective regimen sliding scale   SubCutaneous three times a day before meals  insulin lispro Injectable (ADMELOG) 10 Unit(s) SubCutaneous before breakfast  insulin lispro Injectable (ADMELOG) 10 Unit(s) SubCutaneous before lunch  insulin lispro Injectable (ADMELOG) 10 Unit(s) SubCutaneous before dinner  isosorbide   dinitrate Tablet (ISORDIL) 10 milliGRAM(s) Oral three times a day  methylPREDNISolone sodium succinate Injectable 20 milliGRAM(s) IV Push every 8 hours  metoprolol succinate ER 75 milliGRAM(s) Oral daily  rivaroxaban 15 milliGRAM(s) Oral with dinner  simvastatin 40 milliGRAM(s) Oral at bedtime  tamsulosin 0.4 milliGRAM(s) Oral at bedtime    LABS:                        13.6   16.73 )-----------( 147      ( 14 Jul 2021 08:55 )             43.7     07-14    141  |  104  |  73<H>  ----------------------------<  200<H>  4.3   |  30  |  2.19<H>    Ca    8.4<L>      14 Jul 2021 08:55  Phos  4.4     07-14  Mg     2.5     07-14    TPro  6.9  /  Alb  3.1<L>  /  TBili  0.6  /  DBili  .19  /  AST  46<H>  /  ALT  72  /  AlkPhos  100  07-14    07-12 @ 06:29  pH: 7.39  pCO2: 50  pO2: 85  SaO2: 96  07-10 @ 23:11  pH: 7.38  pCO2: 49  pO2: 139  SaO2: 99  ASSESSMENT AND PLAN:  ·	Acute on chronic hypoxic Respiratory failure.  ·	Acute on chronic systolic + diastolic CHF.  ·	Aortic Regurgitation+ Stenosis.  ·	Acute COPD exacerbation.  ·	Leukocytosis.  ·	Renal Insuffiencey.  ·	CAD with CABG+ stents.  ·	S/P AICD.  ·	Obesity.  ·	EMBER.  ·	HTN.  ·	HLD.  ·	DM.  ·	Gout.    Clinically improving.  taper steroids over 5-7 days.  Will add Symbicort.  Nebulizer as needed.  O2 with nocturnal BIPAP.

## 2021-07-14 NOTE — DIETITIAN INITIAL EVALUATION ADULT. - PERTINENT MEDS FT
MEDICATIONS  (STANDING):  albuterol/ipratropium for Nebulization 3 milliLiter(s) Nebulizer every 6 hours  allopurinol 100 milliGRAM(s) Oral daily  aspirin enteric coated 81 milliGRAM(s) Oral daily  dextrose 40% Gel 15 Gram(s) Oral once  dextrose 5%. 1000 milliLiter(s) (50 mL/Hr) IV Continuous <Continuous>  dextrose 5%. 1000 milliLiter(s) (100 mL/Hr) IV Continuous <Continuous>  dextrose 50% Injectable 25 Gram(s) IV Push once  dextrose 50% Injectable 12.5 Gram(s) IV Push once  dextrose 50% Injectable 25 Gram(s) IV Push once  furosemide   Injectable 40 milliGRAM(s) IV Push two times a day  glucagon  Injectable 1 milliGRAM(s) IntraMuscular once  hydrALAZINE 10 milliGRAM(s) Oral three times a day  insulin glargine Injectable (LANTUS) 30 Unit(s) SubCutaneous at bedtime  insulin lispro (ADMELOG) corrective regimen sliding scale   SubCutaneous three times a day before meals  insulin lispro Injectable (ADMELOG) 10 Unit(s) SubCutaneous before breakfast  insulin lispro Injectable (ADMELOG) 10 Unit(s) SubCutaneous before lunch  insulin lispro Injectable (ADMELOG) 10 Unit(s) SubCutaneous before dinner  isosorbide   dinitrate Tablet (ISORDIL) 10 milliGRAM(s) Oral three times a day  methylPREDNISolone sodium succinate Injectable 20 milliGRAM(s) IV Push every 8 hours  metoprolol succinate ER 75 milliGRAM(s) Oral daily  rivaroxaban 15 milliGRAM(s) Oral with dinner  simvastatin 40 milliGRAM(s) Oral at bedtime  tamsulosin 0.4 milliGRAM(s) Oral at bedtime    MEDICATIONS  (PRN):

## 2021-07-14 NOTE — PROGRESS NOTE ADULT - SUBJECTIVE AND OBJECTIVE BOX
SUBJECTIVE / OVERNIGHT EVENTS: pt seen and examined    MEDICATIONS  (STANDING):  albuterol/ipratropium for Nebulization 3 milliLiter(s) Nebulizer every 6 hours  allopurinol 100 milliGRAM(s) Oral daily  aspirin enteric coated 81 milliGRAM(s) Oral daily  budesonide 160 MICROgram(s)/formoterol 4.5 MICROgram(s) Inhaler 2 Puff(s) Inhalation two times a day  dextrose 40% Gel 15 Gram(s) Oral once  dextrose 5%. 1000 milliLiter(s) (50 mL/Hr) IV Continuous <Continuous>  dextrose 5%. 1000 milliLiter(s) (100 mL/Hr) IV Continuous <Continuous>  dextrose 50% Injectable 25 Gram(s) IV Push once  dextrose 50% Injectable 12.5 Gram(s) IV Push once  dextrose 50% Injectable 25 Gram(s) IV Push once  furosemide   Injectable 40 milliGRAM(s) IV Push two times a day  glucagon  Injectable 1 milliGRAM(s) IntraMuscular once  hydrALAZINE 10 milliGRAM(s) Oral three times a day  insulin glargine Injectable (LANTUS) 30 Unit(s) SubCutaneous at bedtime  insulin lispro (ADMELOG) corrective regimen sliding scale   SubCutaneous three times a day before meals  insulin lispro Injectable (ADMELOG) 10 Unit(s) SubCutaneous before breakfast  insulin lispro Injectable (ADMELOG) 10 Unit(s) SubCutaneous before lunch  insulin lispro Injectable (ADMELOG) 10 Unit(s) SubCutaneous before dinner  isosorbide   dinitrate Tablet (ISORDIL) 10 milliGRAM(s) Oral three times a day  methylPREDNISolone sodium succinate Injectable 20 milliGRAM(s) IV Push every 8 hours  metoprolol succinate ER 75 milliGRAM(s) Oral daily  rivaroxaban 15 milliGRAM(s) Oral with dinner  simvastatin 40 milliGRAM(s) Oral at bedtime  tamsulosin 0.4 milliGRAM(s) Oral at bedtime    MEDICATIONS  (PRN):    Vital Signs Last 24 Hrs  T(C): 36.6 (14 Jul 2021 23:51), Max: 36.6 (14 Jul 2021 10:50)  T(F): 97.8 (14 Jul 2021 23:51), Max: 97.9 (14 Jul 2021 16:13)  HR: 71 (15 Jul 2021 00:12) (70 - 79)  BP: 163/78 (14 Jul 2021 23:51) (105/69 - 163/78)  BP(mean): --  RR: 18 (14 Jul 2021 23:51) (18 - 18)  SpO2: 100% (15 Jul 2021 00:12) (88% - 100%)  Constitutional: No fever, fatigue  Skin: No rash.  Eyes: No recent vision problems or eye pain.  ENT: No congestion, ear pain, or sore throat.  Cardiovascular: No chest pain or palpation.  Respiratory: No cough, shortness of breath, congestion, or wheezing.  Gastrointestinal: No abdominal pain, nausea, vomiting, or diarrhea.  Genitourinary: No dysuria.  Musculoskeletal: No joint swelling.  Neurologic: No headache.    PHYSICAL EXAM:  GENERAL: NAD  EYES: EOMI, PERRLA  NECK: Supple, No JVD  CHEST/LUNG: dec breath sounds at bases   HEART:  S1 , S2 +  ABDOMEN: soft , bs+  EXTREMITIES:  edema+  NEUROLOGY:alert awake     LABS:  07-14    141  |  104  |  73<H>  ----------------------------<  200<H>  4.3   |  30  |  2.19<H>    Ca    8.4<L>      14 Jul 2021 08:55  Phos  4.4     07-14  Mg     2.5     07-14    TPro  6.9  /  Alb  3.1<L>  /  TBili  0.6  /  DBili  .19  /  AST  46<H>  /  ALT  72  /  AlkPhos  100  07-14    Creatinine Trend: 2.19 <--, 2.30 <--, 2.10 <--, 2.00 <--                        13.6   16.73 )-----------( 147      ( 14 Jul 2021 08:55 )             43.7     Urine Studies:            LIVER FUNCTIONS - ( 14 Jul 2021 08:55 )  Alb: 3.1 g/dL / Pro: 6.9 gm/dL / ALK PHOS: 100 U/L / ALT: 72 U/L / AST: 46 U/L / GGT: x               LIVER FUNCTIONS - ( 13 Jul 2021 07:55 )  Alb: 3.3 g/dL / Pro: 6.8 gm/dL / ALK PHOS: 107 U/L / ALT: 72 U/L / AST: 40 U/L / GGT: x

## 2021-07-14 NOTE — PROGRESS NOTE ADULT - SUBJECTIVE AND OBJECTIVE BOX
Patient is a 75y old  Male who presents with a chief complaint of Dyspnea (14 Jul 2021 12:29)    PAST MEDICAL & SURGICAL HISTORY:  HTN (hypertension)    DM (diabetes mellitus), type 2    High cholesterol    CHF (congestive heart failure)    Pneumonia    COPD (chronic obstructive pulmonary disease)    Pacemaker    AICD (automatic cardioverter/defibrillator) present    S/P CABG x 3  cabg3  in 2003    S/P cardiac pacemaker procedure  defibrilator 2012    S/P cardiac cath  cardiac stentin 2011    S/P hernia repair  hernia [id number]    INTERVAL HISTORY: Patient in bed, Oxygen NC in use  	  MEDICATIONS:  MEDICATIONS  (STANDING):  albuterol/ipratropium for Nebulization 3 milliLiter(s) Nebulizer every 6 hours  allopurinol 100 milliGRAM(s) Oral daily  aspirin enteric coated 81 milliGRAM(s) Oral daily  dextrose 40% Gel 15 Gram(s) Oral once  dextrose 5%. 1000 milliLiter(s) (50 mL/Hr) IV Continuous <Continuous>  dextrose 5%. 1000 milliLiter(s) (100 mL/Hr) IV Continuous <Continuous>  dextrose 50% Injectable 25 Gram(s) IV Push once  dextrose 50% Injectable 12.5 Gram(s) IV Push once  dextrose 50% Injectable 25 Gram(s) IV Push once  furosemide   Injectable 40 milliGRAM(s) IV Push two times a day  glucagon  Injectable 1 milliGRAM(s) IntraMuscular once  hydrALAZINE 10 milliGRAM(s) Oral three times a day  insulin glargine Injectable (LANTUS) 30 Unit(s) SubCutaneous at bedtime  insulin lispro (ADMELOG) corrective regimen sliding scale   SubCutaneous three times a day before meals  insulin lispro Injectable (ADMELOG) 10 Unit(s) SubCutaneous before breakfast  insulin lispro Injectable (ADMELOG) 10 Unit(s) SubCutaneous before lunch  insulin lispro Injectable (ADMELOG) 10 Unit(s) SubCutaneous before dinner  isosorbide   dinitrate Tablet (ISORDIL) 10 milliGRAM(s) Oral three times a day  methylPREDNISolone sodium succinate Injectable 20 milliGRAM(s) IV Push every 8 hours  metoprolol succinate ER 75 milliGRAM(s) Oral daily  rivaroxaban 15 milliGRAM(s) Oral with dinner  simvastatin 40 milliGRAM(s) Oral at bedtime  tamsulosin 0.4 milliGRAM(s) Oral at bedtime    MEDICATIONS  (PRN):      Vitals:  T(F): 97.9 (07-14-21 @ 16:13), Max: 97.9 (07-14-21 @ 16:13)  HR: 70 (07-14-21 @ 16:45) (70 - 79)  BP: 156/79 (07-14-21 @ 16:13) (105/69 - 156/79)  RR: 18 (07-14-21 @ 16:13) (18 - 18)  SpO2: 96% (07-14-21 @ 16:45) (88% - 100%)  Wt(kg): --114.2 kg    07-13 @ 07:01  -  07-14 @ 07:00  --------------------------------------------------------  IN:  Total IN: 0 mL    OUT:    Voided (mL): 700 mL  Total OUT: 700 mL    Total NET: -700 mL      07-14 @ 07:01  -  07-14 @ 17:23  --------------------------------------------------------  IN:    Oral Fluid: 780 mL  Total IN: 780 mL    OUT:  Total OUT: 0 mL    Total NET: 780 mL    PHYSICAL EXAM:  Neuro: Awake, responsive  CV: S1 S2 RRR  Lungs: Congested at base  GI: Soft, BS +, ND, NT  Extremities: No edema    TELEMETRY: Paced  	    ECG:  	< from: 12 Lead ECG (07.10.21 @ 22:51) >  Ventricular Rate 80 BPM    Atrial Rate 69 BPM    QRS Duration 198 ms    Q-T Interval 492 ms    QTC Calculation(Bazett) 567 ms    R Axis -36 degrees    T Axis 68 degrees    Diagnosis Line Ventricular-paced rhythm  Biventricular pacemaker detected  Abnormal ECG  When compared with ECG of 13-MAR-2021 14:43,  Vent. rate has increased BY   9 BPM    < end of copied text >      RADIOLOGY: < from: Xray Chest 1 View- PORTABLE-Urgent (Xray Chest 1 View- PORTABLE-Urgent .) (07.11.21 @ 03:36) >  INTERPRETATION:  AP semierect chest on July 11, 2021 at 3:20 AM. Patient is short of breath.    Gross heart enlargement, sternotomy, and left-sided defibrillator again noted.    On March 13 of this year there was significant congestive findings.    On has an examination similar findings are seen.    IMPRESSION: Mild CHF unchanged. Stable cardiac findings.      < end of copied text >      DIAGNOSTIC TESTING:    [x ] Echocardiogram: < from: TTE Echo Complete w/o Contrast w/ Doppler (07.13.21 @ 10:39) >  Summary:   1. Left ventricular ejection fraction,by visual estimation, is 40 to 45%.   2. Technically difficult study.   3. Mildly decreased global left ventricular systolic function.   4. Mid and apical anterior wall, mid and apical anterior septum, mid and apical inferior septum, and mid anterolateral segment are abnormal as described above.   5. Mildly increased left ventricular internal cavity size.   6. The left ventricular diastolic function could not be assessed in this study.   7. There is mild concentric left ventricular hypertrophy.  8. Mildly enlarged right ventricle.   9. Mildly reduced RV systolic function.  10. Normal left atrial size.  11. Normal right atrial size.  12. There is no evidence of pericardial effusion.  13. Mild mitral annular calcification.  14. Mild thickeningand calcification of the anterior and posterior mitral valve leaflets.  15. Trace mitral valve regurgitation.  16. Mild tricuspid regurgitation.  17. Moderate aortic regurgitation.  18. Moderate to severe aortic valve stenosis.  19. Mild pulmonic valve regurgitation.  20. C/w the study of 3-14-21, findings are similar.  21. There is mild aortic root calcification.    < end of copied text >       [x ] Stress Test:  < from: NM Pharm Stress Test, Dual Isotope (03.17.21 @ 12:40) >  IMPRESSION: Abnormal myocardial perfusion scan.    1. There was scintigraphic evidence for a myocardial infarct in the RCA territory with no significant ischemia.    2. There is severely abnormal left ventricular contractility, globally diminished calculated ejection fraction and no wall motion abnormlaities. Overall post stress ejection fraction was 21%    3. Please see the cardiac test report for EKG findings and symptoms during the procedure      < end of copied text >         LABS:	 	    CARDIAC MARKERS:  Troponin I, Serum: .149 ng/mL (07-12 @ 07:33)    14 Jul 2021 08:55    141    |  104    |  73     ----------------------------<  200    4.3     |  30     |  2.19   13 Jul 2021 07:55    140    |  104    |  75     ----------------------------<  361    4.8     |  29     |  2.30   12 Jul 2021 07:33    141    |  104    |  58     ----------------------------<  352    4.5     |  28     |  2.10     Ca    8.4        14 Jul 2021 08:55  Phos  4.4       14 Jul 2021 08:55  Mg     2.5       14 Jul 2021 08:55    TPro  6.9    /  Alb  3.1    /  TBili  0.6    /  DBili  .19    /  AST  46     /  ALT  72     /  AlkPhos  100    14 Jul 2021 08:55                          13.6   16.73 )-----------( 147      ( 14 Jul 2021 08:55 )             43.7 ,                       13.2   15.70 )-----------( 140      ( 13 Jul 2021 07:55 )             43.4 ,                       13.3   16.72 )-----------( 135      ( 12 Jul 2021 07:33 )             43.6   proBNP: Serum Pro-Brain Natriuretic Peptide: 6856 pg/mL (07-14 @ 08:55)

## 2021-07-14 NOTE — PROGRESS NOTE ADULT - ASSESSMENT
75y Male with known hx DM, HTN, HLD, Obesity, COPD (on home o2 2L), CHF, CAD s/p 4 vessel CABG with AICD for ischemic cardiomyopathy.  Admitted with respiratory distress x  2 days. EMS found him in resp failure and hypertensive 180s / 90s with rales in bilateral lung fields.    Imp:  Acute on Chronic Systolic/Diastolic Heart Failure  Hypoxemic Hypercarbic Resp Failure  Hypertensive Urgency  Aortic Stenosis  Mild pulmonary hypertension  EF 40-45% mild TR, MA, mod AR, mod -sev AS (similar to previous)    Plan:  Diuresing adequately, on IV Lasix. If Cr decreased to 2.19 today  Continue Metoprolol. ARB/ACE relatively contraindicated due to CKD Stage III.  With improved resp status, BP's have returned to normal.  Would benefit from Hydralzaine/Nitrate combination for improved afterload reduction.  Monitor renal function,  daily weights, I&Os. Increase in Cr noted, will evaluate in AM  DM management per medicine and Endocrine  COPD management per Pulm

## 2021-07-14 NOTE — PROGRESS NOTE ADULT - SUBJECTIVE AND OBJECTIVE BOX
[facility] NEPHROLOGY SERVICES, [facility]  NEPHROLOGY AND HYPERTENSION  [address]  [address]  [place] [address]  [phone]    MD DANIEL GARRETT MD ANDREY GONCHARUK, MD MADHU KORRAPATI, MD YELENA ROSENBERG, MD BINNY KOSHY, MD CHRISTOPHER CAPUTO, MD EDWARD BOVER, MD          Patient events noted  feels well no distress      MEDICATIONS  (STANDING):  albuterol/ipratropium for Nebulization 3 milliLiter(s) Nebulizer every 6 hours  allopurinol 100 milliGRAM(s) Oral daily  aspirin enteric coated 81 milliGRAM(s) Oral daily  budesonide 160 MICROgram(s)/formoterol 4.5 MICROgram(s) Inhaler 2 Puff(s) Inhalation two times a day  dextrose 40% Gel 15 Gram(s) Oral once  dextrose 5%. 1000 milliLiter(s) (50 mL/Hr) IV Continuous <Continuous>  dextrose 5%. 1000 milliLiter(s) (100 mL/Hr) IV Continuous <Continuous>  dextrose 50% Injectable 25 Gram(s) IV Push once  dextrose 50% Injectable 12.5 Gram(s) IV Push once  dextrose 50% Injectable 25 Gram(s) IV Push once  furosemide   Injectable 40 milliGRAM(s) IV Push two times a day  glucagon  Injectable 1 milliGRAM(s) IntraMuscular once  hydrALAZINE 10 milliGRAM(s) Oral three times a day  insulin glargine Injectable (LANTUS) 30 Unit(s) SubCutaneous at bedtime  insulin lispro (ADMELOG) corrective regimen sliding scale   SubCutaneous three times a day before meals  insulin lispro Injectable (ADMELOG) 10 Unit(s) SubCutaneous before breakfast  insulin lispro Injectable (ADMELOG) 10 Unit(s) SubCutaneous before lunch  insulin lispro Injectable (ADMELOG) 10 Unit(s) SubCutaneous before dinner  isosorbide   dinitrate Tablet (ISORDIL) 10 milliGRAM(s) Oral three times a day  methylPREDNISolone sodium succinate Injectable 20 milliGRAM(s) IV Push every 8 hours  metoprolol succinate ER 75 milliGRAM(s) Oral daily  rivaroxaban 15 milliGRAM(s) Oral with dinner  simvastatin 40 milliGRAM(s) Oral at bedtime  tamsulosin 0.4 milliGRAM(s) Oral at bedtime    MEDICATIONS  (PRN):      07-13-21 @ 07:01  -  07-14-21 @ 07:00  --------------------------------------------------------  IN: 0 mL / OUT: 700 mL / NET: -700 mL    07-14-21 @ 07:01  -  07-14-21 @ 22:09  --------------------------------------------------------  IN: 780 mL / OUT: 0 mL / NET: 780 mL      PHYSICAL EXAM:      T(C): 36.6 (07-14-21 @ 16:13), Max: 36.6 (07-14-21 @ 10:50)  HR: 70 (07-14-21 @ 21:55) (70 - 79)  BP: 150/72 (07-14-21 @ 21:29) (105/69 - 156/79)  RR: 18 (07-14-21 @ 16:13) (18 - 18)  SpO2: 96% (07-14-21 @ 21:55) (88% - 100%)  Wt(kg): --  Lungs clear  Heart S1S2  Abd soft NT ND  Extremities:   tr edema                                    13.6   16.73 )-----------( 147      ( 14 Jul 2021 08:55 )             43.7     07-14    141  |  104  |  73<H>  ----------------------------<  200<H>  4.3   |  30  |  2.19<H>    Ca    8.4<L>      14 Jul 2021 08:55  Phos  4.4     07-14  Mg     2.5     07-14    TPro  6.9  /  Alb  3.1<L>  /  TBili  0.6  /  DBili  .19  /  AST  46<H>  /  ALT  72  /  AlkPhos  100  07-14      LIVER FUNCTIONS - ( 14 Jul 2021 08:55 )  Alb: 3.1 g/dL / Pro: 6.9 gm/dL / ALK PHOS: 100 U/L / ALT: 72 U/L / AST: 46 U/L / GGT: x           Creatinine Trend: 2.19<--, 2.30<--, 2.10<--, 2.00<--      Assessment   CKD 3-4 stable    Plan:  Continue current rx  Will follow     Jon Rowe MD

## 2021-07-14 NOTE — PROGRESS NOTE ADULT - SUBJECTIVE AND OBJECTIVE BOX
Patient is a 75y old  Male who presents with a chief complaint of SOB (14 Jul 2021 17:23)      Interval History: patient's steroids does have been decreased.  Finger sticks estimated in the mid 200s-300  Steroid induced hyperglycemia      MEDICATIONS  (STANDING):  albuterol/ipratropium for Nebulization 3 milliLiter(s) Nebulizer every 6 hours  allopurinol 100 milliGRAM(s) Oral daily  aspirin enteric coated 81 milliGRAM(s) Oral daily  budesonide 160 MICROgram(s)/formoterol 4.5 MICROgram(s) Inhaler 2 Puff(s) Inhalation two times a day  dextrose 40% Gel 15 Gram(s) Oral once  dextrose 5%. 1000 milliLiter(s) (50 mL/Hr) IV Continuous <Continuous>  dextrose 5%. 1000 milliLiter(s) (100 mL/Hr) IV Continuous <Continuous>  dextrose 50% Injectable 25 Gram(s) IV Push once  dextrose 50% Injectable 12.5 Gram(s) IV Push once  dextrose 50% Injectable 25 Gram(s) IV Push once  furosemide   Injectable 40 milliGRAM(s) IV Push two times a day  glucagon  Injectable 1 milliGRAM(s) IntraMuscular once  hydrALAZINE 10 milliGRAM(s) Oral three times a day  insulin glargine Injectable (LANTUS) 30 Unit(s) SubCutaneous at bedtime  insulin lispro (ADMELOG) corrective regimen sliding scale   SubCutaneous three times a day before meals  insulin lispro Injectable (ADMELOG) 10 Unit(s) SubCutaneous before breakfast  insulin lispro Injectable (ADMELOG) 10 Unit(s) SubCutaneous before lunch  insulin lispro Injectable (ADMELOG) 10 Unit(s) SubCutaneous before dinner  isosorbide   dinitrate Tablet (ISORDIL) 10 milliGRAM(s) Oral three times a day  methylPREDNISolone sodium succinate Injectable 20 milliGRAM(s) IV Push every 8 hours  metoprolol succinate ER 75 milliGRAM(s) Oral daily  rivaroxaban 15 milliGRAM(s) Oral with dinner  simvastatin 40 milliGRAM(s) Oral at bedtime  tamsulosin 0.4 milliGRAM(s) Oral at bedtime    MEDICATIONS  (PRN):      Allergies    No Known Allergies    Intolerances        REVIEW OF SYSTEMS:  CONSTITUTIONAL: no changes  EYES: No eye pain, visual disturbances, or discharge  ENMT:  No difficulty hearing, No sinus or throat pain  NECK: No pain or stiffness  RESPIRATORY: No cough, wheezing, chills or hemoptysis; No shortness of breath  CARDIOVASCULAR: No chest pain, palpitations or leg swelling  GASTROINTESTINAL: No abdominal or epigastric pain. No nausea, vomiting, or hematemesis; No diarrhea or constipation. No melena or hematochezia.  GENITOURINARY: No dysuria, frequency, hematuria, or incontinence  NEUROLOGICAL: No headaches, memory loss, loss of strength, numbness, or tremors  SKIN: No itching, burning, rashes, or lesions   ENDOCRINE: No heat or cold intolerance; No hair loss  MUSCULOSKELETAL: No joint pain or swelling; No muscle, back, or extremity pain  PSYCHIATRIC: No depression, anxiety, mood swings, or difficulty sleeping  HEME/LYMPH: No easy bruising, or bleeding gums  ALLERY AND IMMUNOLOGIC: No hives or eczema    Vital Signs Last 24 Hrs  T(C): 36.6 (14 Jul 2021 16:13), Max: 36.6 (14 Jul 2021 10:50)  T(F): 97.9 (14 Jul 2021 16:13), Max: 97.9 (14 Jul 2021 16:13)  HR: 70 (14 Jul 2021 21:55) (70 - 79)  BP: 150/72 (14 Jul 2021 21:29) (105/69 - 156/79)  BP(mean): --  RR: 18 (14 Jul 2021 16:13) (18 - 18)  SpO2: 96% (14 Jul 2021 21:55) (88% - 100%)    PHYSICAL EXAM:  GENERAL:   HEAD: Atraumatic, Normocephalic  EYES: PERRLA, conjunctiva and sclera clear  ENMT: No tonsillar erythema, exudates, or enlargement; Moist mucous membranes, Good dentition, No lesions  NECK: Supple, No JVD, Normal thyroid  NERVOUS SYSTEM:  Alert & Oriented X3, Good concentration; Motor Strength 5/5 B/L upper and lower extremities  CHEST/LUNG: Clear to auscultaion bilaterally; No rales, rhonchi, wheezing, or rubs  HEART: Regular rate and rhythm; No murmurs, rubs, or gallops  ABDOMEN: Soft, Nontender, Nondistended; Bowel sounds present  EXTREMITIES:  2+ Peripheral Pulses, no edema  SKIN: No rashes or lesions    LABS:        CAPILLARY BLOOD GLUCOSE      POCT Blood Glucose.: 333 mg/dL (14 Jul 2021 21:23)  POCT Blood Glucose.: 381 mg/dL (14 Jul 2021 18:05)  POCT Blood Glucose.: 272 mg/dL (14 Jul 2021 11:15)  POCT Blood Glucose.: 208 mg/dL (14 Jul 2021 07:46)    Lipid panel:           Thyroid:  Diabetes Tests:  Parathyroid Panel:  Adrenals:  RADIOLOGY & ADDITIONAL TESTS:    Imaging Personally Reviewed:  [ ] YES  [ ] NO    Consultant(s) Notes Reviewed:  [ ] YES  [ ] NO    Care Discussed with Consultants/Other Providers [ ] YES  [ ] NO

## 2021-07-14 NOTE — DIETITIAN INITIAL EVALUATION ADULT. - OTHER CALCULATIONS
IBW used for calculation of estimated needs. Wt: 07/11, 114 kg  IBW:  79.3 kg  %IBW: 143%    UBW:   108.8 kg    % UBW:  104%

## 2021-07-14 NOTE — DIETITIAN INITIAL EVALUATION ADULT. - ORAL INTAKE PTA/DIET HISTORY
Pt c good appetite PTA.  Pt's wife was @ bedside.   Pt's wife did the shopping/cooking.  Diet PTA: Low sodium, CHO controlled, however c lack of adherence to fluid restriction  due to c/o thirst & has a bag of chips PTA.

## 2021-07-14 NOTE — DIETITIAN INITIAL EVALUATION ADULT. - PERTINENT LABORATORY DATA
07-14 Na141 mmol/L Glu 200 mg/dL<H> K+ 4.3 mmol/L Cr  2.19 mg/dL<H> BUN 73 mg/dL<H> 07-14 Phos 4.4 mg/dL 07-14 Alb 3.1 g/dL<L>07-14 ALT 72 U/L AST 46 U/L<H> Alkaline Phosphatase 100 U/L  07-12-21 @ 08:47 a1c 8.6<H>

## 2021-07-14 NOTE — DIETITIAN INITIAL EVALUATION ADULT. - OTHER INFO
Pt is known to RD from previous adm.  Pt c multiple adm for heart failure.  Pt was on Toujeo insulin & Trulicity injection PTA(was taken off pills for DM).  Pt self monitored blood glucose 3-4 times per day, was not regularly self monitoring wt. daily for CHF.   Nutrition therapy reviewed for heart failure, consistent CHO c meal planning c plate method, blood glucose goal for older adult of A1C<8.0%.   Daily wt. monitoring encouraged.  Endocrine consult noted.  Stated Height: 5'10.5"

## 2021-07-15 ENCOUNTER — TRANSCRIPTION ENCOUNTER (OUTPATIENT)
Age: 76
End: 2021-07-15

## 2021-07-15 VITALS — HEART RATE: 74 BPM | OXYGEN SATURATION: 96 %

## 2021-07-15 LAB
GLUCOSE BLDC GLUCOMTR-MCNC: 319 MG/DL — HIGH (ref 70–99)
GLUCOSE BLDC GLUCOMTR-MCNC: 358 MG/DL — HIGH (ref 70–99)
HCT VFR BLD CALC: 46.6 % — SIGNIFICANT CHANGE UP (ref 39–50)
HGB BLD-MCNC: 14.4 G/DL — SIGNIFICANT CHANGE UP (ref 13–17)
MCHC RBC-ENTMCNC: 29.8 PG — SIGNIFICANT CHANGE UP (ref 27–34)
MCHC RBC-ENTMCNC: 30.9 GM/DL — LOW (ref 32–36)
MCV RBC AUTO: 96.3 FL — SIGNIFICANT CHANGE UP (ref 80–100)
NRBC # BLD: 0 /100 WBCS — SIGNIFICANT CHANGE UP (ref 0–0)
PLATELET # BLD AUTO: 170 K/UL — SIGNIFICANT CHANGE UP (ref 150–400)
RBC # BLD: 4.84 M/UL — SIGNIFICANT CHANGE UP (ref 4.2–5.8)
RBC # FLD: 16.6 % — HIGH (ref 10.3–14.5)
WBC # BLD: 17.95 K/UL — HIGH (ref 3.8–10.5)
WBC # FLD AUTO: 17.95 K/UL — HIGH (ref 3.8–10.5)

## 2021-07-15 PROCEDURE — 99233 SBSQ HOSP IP/OBS HIGH 50: CPT

## 2021-07-15 RX ORDER — ASPIRIN/CALCIUM CARB/MAGNESIUM 324 MG
1 TABLET ORAL
Qty: 30 | Refills: 0
Start: 2021-07-15 | End: 2021-08-13

## 2021-07-15 RX ORDER — ALLOPURINOL 300 MG
1 TABLET ORAL
Qty: 0 | Refills: 0 | DISCHARGE
Start: 2021-07-15

## 2021-07-15 RX ORDER — METOPROLOL TARTRATE 50 MG
1.5 TABLET ORAL
Qty: 0 | Refills: 0 | DISCHARGE

## 2021-07-15 RX ORDER — HYDRALAZINE HCL 50 MG
1 TABLET ORAL
Qty: 90 | Refills: 0
Start: 2021-07-15 | End: 2021-08-13

## 2021-07-15 RX ORDER — RIVAROXABAN 15 MG-20MG
1 KIT ORAL
Qty: 30 | Refills: 0
Start: 2021-07-15 | End: 2021-08-13

## 2021-07-15 RX ORDER — FUROSEMIDE 40 MG
40 TABLET ORAL
Refills: 0 | Status: DISCONTINUED | OUTPATIENT
Start: 2021-07-15 | End: 2021-07-15

## 2021-07-15 RX ORDER — SIMVASTATIN 20 MG/1
1 TABLET, FILM COATED ORAL
Qty: 0 | Refills: 0 | DISCHARGE
Start: 2021-07-15

## 2021-07-15 RX ORDER — TAMSULOSIN HYDROCHLORIDE 0.4 MG/1
1 CAPSULE ORAL
Qty: 0 | Refills: 0 | DISCHARGE

## 2021-07-15 RX ORDER — IPRATROPIUM/ALBUTEROL SULFATE 18-103MCG
3 AEROSOL WITH ADAPTER (GRAM) INHALATION
Qty: 30 | Refills: 0
Start: 2021-07-15 | End: 2021-08-13

## 2021-07-15 RX ORDER — ASPIRIN/CALCIUM CARB/MAGNESIUM 324 MG
1 TABLET ORAL
Qty: 0 | Refills: 0 | DISCHARGE
Start: 2021-07-15

## 2021-07-15 RX ORDER — TAMSULOSIN HYDROCHLORIDE 0.4 MG/1
1 CAPSULE ORAL
Qty: 0 | Refills: 0 | DISCHARGE
Start: 2021-07-15

## 2021-07-15 RX ORDER — IPRATROPIUM/ALBUTEROL SULFATE 18-103MCG
3 AEROSOL WITH ADAPTER (GRAM) INHALATION
Qty: 60 | Refills: 0
Start: 2021-07-15

## 2021-07-15 RX ORDER — SIMVASTATIN 20 MG/1
1 TABLET, FILM COATED ORAL
Qty: 0 | Refills: 0 | DISCHARGE

## 2021-07-15 RX ORDER — METOPROLOL TARTRATE 50 MG
1.5 TABLET ORAL
Qty: 45 | Refills: 0
Start: 2021-07-15 | End: 2021-08-13

## 2021-07-15 RX ORDER — ALBUTEROL 90 UG/1
2 AEROSOL, METERED ORAL
Qty: 1 | Refills: 0
Start: 2021-07-15 | End: 2021-08-13

## 2021-07-15 RX ORDER — ALLOPURINOL 300 MG
2 TABLET ORAL
Qty: 0 | Refills: 0 | DISCHARGE

## 2021-07-15 RX ORDER — ISOSORBIDE DINITRATE 5 MG/1
1 TABLET ORAL
Qty: 90 | Refills: 0
Start: 2021-07-15 | End: 2021-08-13

## 2021-07-15 RX ORDER — FUROSEMIDE 40 MG
1 TABLET ORAL
Qty: 60 | Refills: 0
Start: 2021-07-15 | End: 2021-08-13

## 2021-07-15 RX ORDER — BUDESONIDE AND FORMOTEROL FUMARATE DIHYDRATE 160; 4.5 UG/1; UG/1
2 AEROSOL RESPIRATORY (INHALATION)
Qty: 1 | Refills: 0
Start: 2021-07-15

## 2021-07-15 RX ADMIN — Medication 100 MILLIGRAM(S): at 12:08

## 2021-07-15 RX ADMIN — Medication 3 MILLILITER(S): at 11:01

## 2021-07-15 RX ADMIN — Medication 75 MILLIGRAM(S): at 05:32

## 2021-07-15 RX ADMIN — Medication 81 MILLIGRAM(S): at 12:08

## 2021-07-15 RX ADMIN — ISOSORBIDE DINITRATE 10 MILLIGRAM(S): 5 TABLET ORAL at 05:32

## 2021-07-15 RX ADMIN — Medication 40 MILLIGRAM(S): at 05:34

## 2021-07-15 RX ADMIN — Medication 4: at 07:34

## 2021-07-15 RX ADMIN — Medication 20 MILLIGRAM(S): at 13:21

## 2021-07-15 RX ADMIN — Medication 10 UNIT(S): at 12:09

## 2021-07-15 RX ADMIN — Medication 20 MILLIGRAM(S): at 05:31

## 2021-07-15 RX ADMIN — Medication 10 MILLIGRAM(S): at 05:31

## 2021-07-15 RX ADMIN — Medication 3 MILLILITER(S): at 17:04

## 2021-07-15 RX ADMIN — Medication 10 MILLIGRAM(S): at 13:21

## 2021-07-15 RX ADMIN — Medication 5: at 12:09

## 2021-07-15 RX ADMIN — BUDESONIDE AND FORMOTEROL FUMARATE DIHYDRATE 2 PUFF(S): 160; 4.5 AEROSOL RESPIRATORY (INHALATION) at 05:31

## 2021-07-15 RX ADMIN — Medication 3 MILLILITER(S): at 05:35

## 2021-07-15 RX ADMIN — Medication 10 UNIT(S): at 07:34

## 2021-07-15 RX ADMIN — ISOSORBIDE DINITRATE 10 MILLIGRAM(S): 5 TABLET ORAL at 12:08

## 2021-07-15 NOTE — PROGRESS NOTE ADULT - SUBJECTIVE AND OBJECTIVE BOX
Patient is a 75y old  Male who presents with a chief complaint of SOB (14 Jul 2021 17:23)    PAST MEDICAL & SURGICAL HISTORY:  HTN (hypertension)    DM (diabetes mellitus), type 2    High cholesterol    CHF (congestive heart failure)    Pneumonia    COPD (chronic obstructive pulmonary disease)    Pacemaker    AICD (automatic cardioverter/defibrillator) present    S/P CABG x 3  cabg3  in 2003    S/P cardiac pacemaker procedure  defibrilator 2012    S/P cardiac cath  cardiac stentin 2011    S/P hernia repair  hernia [id number]    INTERVAL HISTORY: Patient in bed, NC in use, much more comfortable. Denies chest pain, dyspnea, now able to ambulate to bathroom.  	  MEDICATIONS:  MEDICATIONS  (STANDING):  albuterol/ipratropium for Nebulization 3 milliLiter(s) Nebulizer every 6 hours  allopurinol 100 milliGRAM(s) Oral daily  aspirin enteric coated 81 milliGRAM(s) Oral daily  budesonide 160 MICROgram(s)/formoterol 4.5 MICROgram(s) Inhaler 2 Puff(s) Inhalation two times a day  dextrose 40% Gel 15 Gram(s) Oral once  dextrose 5%. 1000 milliLiter(s) (50 mL/Hr) IV Continuous <Continuous>  dextrose 5%. 1000 milliLiter(s) (100 mL/Hr) IV Continuous <Continuous>  dextrose 50% Injectable 25 Gram(s) IV Push once  dextrose 50% Injectable 12.5 Gram(s) IV Push once  dextrose 50% Injectable 25 Gram(s) IV Push once  furosemide    Tablet 40 milliGRAM(s) Oral two times a day  glucagon  Injectable 1 milliGRAM(s) IntraMuscular once  hydrALAZINE 10 milliGRAM(s) Oral three times a day  insulin glargine Injectable (LANTUS) 30 Unit(s) SubCutaneous at bedtime  insulin lispro (ADMELOG) corrective regimen sliding scale   SubCutaneous three times a day before meals  insulin lispro Injectable (ADMELOG) 10 Unit(s) SubCutaneous before breakfast  insulin lispro Injectable (ADMELOG) 10 Unit(s) SubCutaneous before lunch  insulin lispro Injectable (ADMELOG) 10 Unit(s) SubCutaneous before dinner  isosorbide   dinitrate Tablet (ISORDIL) 10 milliGRAM(s) Oral three times a day  methylPREDNISolone sodium succinate Injectable 20 milliGRAM(s) IV Push every 8 hours  metoprolol succinate ER 75 milliGRAM(s) Oral daily  rivaroxaban 15 milliGRAM(s) Oral with dinner  simvastatin 40 milliGRAM(s) Oral at bedtime  tamsulosin 0.4 milliGRAM(s) Oral at bedtime    MEDICATIONS  (PRN):    Vitals:  T(F): 97.5 (07-15-21 @ 10:25), Max: 97.9 (07-14-21 @ 16:13)  HR: 70 (07-15-21 @ 11:02) (70 - 78)  BP: 120/71 (07-15-21 @ 10:25) (120/71 - 163/78)  RR: 19 (07-15-21 @ 10:25) (18 - 19)  SpO2: 98% (07-15-21 @ 11:02) (88% - 100%)  Wt(kg): --113.7kg    07-14 @ 07:01  -  07-15 @ 07:00  --------------------------------------------------------  IN:    Oral Fluid: 780 mL  Total IN: 780 mL    OUT:  Total OUT: 0 mL    Total NET: 780 mL    PHYSICAL EXAM:  Neuro: Awake, responsive  CV: S1 S2 RRR, Pacemaker  Lungs: Bilateral base crackles (improved)  GI: Soft, BS +, ND, NT, obese  Extremities: No edema    TELEMETRY: Paced  	    ECG:  < from: 12 Lead ECG (07.10.21 @ 22:51) >  Ventricular Rate 80 BPM    Atrial Rate 69 BPM    QRS Duration 198 ms    Q-T Interval 492 ms    QTC Calculation(Bazett) 567 ms    R Axis -36 degrees    T Axis 68 degrees    Diagnosis Line Ventricular-paced rhythm  Biventricular pacemaker detected  Abnormal ECG  When compared with ECG of 13-MAR-2021 14:43,  Vent. rate has increased BY   9 BPM    < end of copied text >    RADIOLOGY: < from: Xray Chest 1 View- PORTABLE-Urgent (Xray Chest 1 View- PORTABLE-Urgent .) (07.11.21 @ 03:36) >  INTERPRETATION:  AP semierect chest on July 11, 2021 at 3:20 AM. Patient is short of breath.    Gross heart enlargement, sternotomy, and left-sided defibrillator again noted.    On March 13 of this year there was significant congestive findings.    On has an examination similar findings are seen.    IMPRESSION: Mild CHF unchanged. Stable cardiac findings.      < end of copied text >      DIAGNOSTIC TESTING:    [x ] Echocardiogram: < from: TTE Echo Complete w/o Contrast w/ Doppler (07.13.21 @ 10:39) >  Summary:   1. Left ventricular ejection fraction,by visual estimation, is 40 to 45%.   2. Technically difficult study.   3. Mildly decreased global left ventricular systolic function.   4. Mid and apical anterior wall, mid and apical anterior septum, mid and apical inferior septum, and mid anterolateral segment are abnormal as described above.   5. Mildly increased left ventricular internal cavity size.   6. The left ventricular diastolic function could not be assessed in this study.   7. There is mild concentric left ventricular hypertrophy.  8. Mildly enlarged right ventricle.   9. Mildly reduced RV systolic function.  10. Normal left atrial size.  11. Normal right atrial size.  12. There is no evidence of pericardial effusion.  13. Mild mitral annular calcification.  14. Mild thickeningand calcification of the anterior and posterior mitral valve leaflets.  15. Trace mitral valve regurgitation.  16. Mild tricuspid regurgitation.  17. Moderate aortic regurgitation.  18. Moderate to severe aortic valve stenosis.  19. Mild pulmonic valve regurgitation.  20. C/w the study of 3-14-21, findings are similar.  21. There is mild aortic root calcification.    < end of copied text >       [x ] Stress Test:  < from: NM Pharm Stress Test, Dual Isotope (03.17.21 @ 12:40) >  IMPRESSION: Abnormal myocardial perfusion scan.    1. There was scintigraphic evidence for a myocardial infarct in the RCA territory with no significant ischemia.    2. There is severely abnormal left ventricular contractility, globally diminished calculated ejection fraction and no wall motion abnormlaities. Overall post stress ejection fraction was 21%    3. Please see the cardiac test report for EKG findings and symptoms during the procedure    < end of copied text >         LABS:	 	     14 Jul 2021 08:55    141    |  104    |  73     ----------------------------<  200    4.3     |  30     |  2.19   13 Jul 2021 07:55    140    |  104    |  75     ----------------------------<  361    4.8     |  29     |  2.30     Ca    8.4        14 Jul 2021 08:55  Phos  4.4       14 Jul 2021 08:55  Mg     2.5       14 Jul 2021 08:55    TPro  6.9    /  Alb  3.1    /  TBili  0.6    /  DBili  .19    /  AST  46     /  ALT  72     /  AlkPhos  100    14 Jul 2021 08:55                          14.4   17.95 )-----------( 170      ( 15 Jul 2021 10:51 )             46.6 ,                       13.6   16.73 )-----------( 147      ( 14 Jul 2021 08:55 )             43.7 ,                       13.2   15.70 )-----------( 140      ( 13 Jul 2021 07:55 )             43.4   proBNP: Serum Pro-Brain Natriuretic Peptide: 6856 pg/mL (07-14 @ 08:55)

## 2021-07-15 NOTE — DISCHARGE NOTE PROVIDER - PROVIDER TOKENS
PROVIDER:[TOKEN:[[id number]],FOLLOWUP:[1 week]],FREE:[LAST:[PCP],PHONE:[(   )    -],FAX:[(   )    -]],PROVIDER:[TOKEN:[[id number]]]

## 2021-07-15 NOTE — DISCHARGE NOTE PROVIDER - NSDCCPCAREPLAN_GEN_ALL_CORE_FT
PRINCIPAL DISCHARGE DIAGNOSIS  Diagnosis: CHF exacerbation  Assessment and Plan of Treatment: continue lasix 40mg twice a day. Please follow up with cardiologist within 1-3 days for lasix taper. Monitor BUN/Cr carefully.      SECONDARY DISCHARGE DIAGNOSES  Diagnosis: COPD exacerbation  Assessment and Plan of Treatment: continue steroid taper, nebulizers, and symbicort.   Prednisone taper:   5 tab(s) orally once a day x 1 days, then  4 tab(s) orally once a day x 1 days, then  3 tab(s) orally once a day x 1 days, then  2 tab(s) orally once a day x 1 days, then  1 tab(s) orally once a day x 1 days, then  0.5 table orally once a day x1 day END    Diagnosis: Essential hypertension  Assessment and Plan of Treatment: continue blood pressure medications, sent to pharmacy. Follow up with PCP within 1 week of discharge date. Monitor blood pressures daily.    Diagnosis: Stage 3 chronic kidney disease  Assessment and Plan of Treatment: avoid nephrotoxic medication. avoid nsaids. Continue with lasix 40mg twice a day, follow up with cardiologist/PCP in 1-3 days to adjust lasix dose and draw labs for cr.     PRINCIPAL DISCHARGE DIAGNOSIS  Diagnosis: CHF exacerbation  Assessment and Plan of Treatment: continue lasix 40mg twice a day. Please follow up with cardiologist within 1-3 days for lasix taper. Monitor BUN/Cr carefully.      SECONDARY DISCHARGE DIAGNOSES  Diagnosis: COPD exacerbation  Assessment and Plan of Treatment: continue steroid taper, nebulizers, and symbicort. Follow up with Dr. Mack in 1 week  Prednisone taper:   5 tab(s) orally once a day x 1 days, then  4 tab(s) orally once a day x 1 days, then  3 tab(s) orally once a day x 1 days, then  2 tab(s) orally once a day x 1 days, then  1 tab(s) orally once a day x 1 days, then  0.5 table orally once a day x1 day END    Diagnosis: Essential hypertension  Assessment and Plan of Treatment: continue blood pressure medications, sent to pharmacy. Follow up with PCP within 1 week of discharge date. Monitor blood pressures daily.    Diagnosis: Stage 3 chronic kidney disease  Assessment and Plan of Treatment: avoid nephrotoxic medication. avoid nsaids. Continue with lasix 40mg twice a day, follow up with cardiologist/PCP in 1-3 days to adjust lasix dose and draw labs for cr.    Diagnosis: Diabetes mellitus  Assessment and Plan of Treatment: continue home diabetic regimen. please follow up with primary care doctor in 1 week.

## 2021-07-15 NOTE — DISCHARGE NOTE PROVIDER - CARE PROVIDERS DIRECT ADDRESSES
[email or web address]

## 2021-07-15 NOTE — PROGRESS NOTE ADULT - SUBJECTIVE AND OBJECTIVE BOX
Subjective: dec dyspnea.       MEDICATIONS  (STANDING):  albuterol/ipratropium for Nebulization 3 milliLiter(s) Nebulizer every 6 hours  allopurinol 100 milliGRAM(s) Oral daily  aspirin enteric coated 81 milliGRAM(s) Oral daily  budesonide 160 MICROgram(s)/formoterol 4.5 MICROgram(s) Inhaler 2 Puff(s) Inhalation two times a day  dextrose 40% Gel 15 Gram(s) Oral once  dextrose 5%. 1000 milliLiter(s) (50 mL/Hr) IV Continuous <Continuous>  dextrose 5%. 1000 milliLiter(s) (100 mL/Hr) IV Continuous <Continuous>  dextrose 50% Injectable 25 Gram(s) IV Push once  dextrose 50% Injectable 12.5 Gram(s) IV Push once  dextrose 50% Injectable 25 Gram(s) IV Push once  furosemide   Injectable 40 milliGRAM(s) IV Push two times a day  glucagon  Injectable 1 milliGRAM(s) IntraMuscular once  hydrALAZINE 10 milliGRAM(s) Oral three times a day  insulin glargine Injectable (LANTUS) 30 Unit(s) SubCutaneous at bedtime  insulin lispro (ADMELOG) corrective regimen sliding scale   SubCutaneous three times a day before meals  insulin lispro Injectable (ADMELOG) 10 Unit(s) SubCutaneous before breakfast  insulin lispro Injectable (ADMELOG) 10 Unit(s) SubCutaneous before lunch  insulin lispro Injectable (ADMELOG) 10 Unit(s) SubCutaneous before dinner  isosorbide   dinitrate Tablet (ISORDIL) 10 milliGRAM(s) Oral three times a day  methylPREDNISolone sodium succinate Injectable 20 milliGRAM(s) IV Push every 8 hours  metoprolol succinate ER 75 milliGRAM(s) Oral daily  rivaroxaban 15 milliGRAM(s) Oral with dinner  simvastatin 40 milliGRAM(s) Oral at bedtime  tamsulosin 0.4 milliGRAM(s) Oral at bedtime    MEDICATIONS  (PRN):          T(C): 36.4 (07-15-21 @ 10:25), Max: 36.6 (07-14-21 @ 16:13)  HR: 70 (07-15-21 @ 11:02) (70 - 78)  BP: 120/71 (07-15-21 @ 10:25) (120/71 - 163/78)  RR: 19 (07-15-21 @ 10:25) (18 - 19)  SpO2: 98% (07-15-21 @ 11:02) (88% - 100%)  Wt(kg): --        I&O's Detail    14 Jul 2021 07:01  -  15 Jul 2021 07:00  --------------------------------------------------------  IN:    Oral Fluid: 780 mL  Total IN: 780 mL    OUT:  Total OUT: 0 mL    Total NET: 780 mL               PHYSICAL EXAM:    GENERAL: NAD  NECK: Supple, no inc in JVP  CHEST/LUNG: dec BS  HEART: S1S2  ABDOMEN: Soft  EXTREMITIES: min ankles edema      LABS:  CBC Full  -  ( 15 Jul 2021 10:51 )  WBC Count : 17.95 K/uL  RBC Count : 4.84 M/uL  Hemoglobin : 14.4 g/dL  Hematocrit : 46.6 %  Platelet Count - Automated : 170 K/uL  Mean Cell Volume : 96.3 fl  Mean Cell Hemoglobin : 29.8 pg  Mean Cell Hemoglobin Concentration : 30.9 gm/dL  Auto Neutrophil # : x  Auto Lymphocyte # : x  Auto Monocyte # : x  Auto Eosinophil # : x  Auto Basophil # : x  Auto Neutrophil % : x  Auto Lymphocyte % : x  Auto Monocyte % : x  Auto Eosinophil % : x  Auto Basophil % : x    07-14    141  |  104  |  73<H>  ----------------------------<  200<H>  4.3   |  30  |  2.19<H>    Ca    8.4<L>      14 Jul 2021 08:55  Phos  4.4     07-14  Mg     2.5     07-14    TPro  6.9  /  Alb  3.1<L>  /  TBili  0.6  /  DBili  .19  /  AST  46<H>  /  ALT  72  /  AlkPhos  100  07-14        Impression:  * CKD3. Baseline Cr 2. Diabetic, hypertensive nephropathy  * Decompensated HF. Systolic CM, at least mod AS  * Hypertensive urgency. Improved.     Recommendations:   * Cont IV lasix as long as Cr remains stable.   * Goal neg fluid balance of 2L per day  * May add Zaroxolyn as needed to augment action of the loop diuretic  * FR 1000cc/day  * monitor Cr daily

## 2021-07-15 NOTE — PROGRESS NOTE ADULT - SUBJECTIVE AND OBJECTIVE BOX
Patient is a 75y old  Male who presents with a chief complaint of SOB (15 Jul 2021 12:36)      Interval History: finger sticks are still  in 300s  discharge planning is      MEDICATIONS  (STANDING):  albuterol/ipratropium for Nebulization 3 milliLiter(s) Nebulizer every 6 hours  allopurinol 100 milliGRAM(s) Oral daily  aspirin enteric coated 81 milliGRAM(s) Oral daily  budesonide 160 MICROgram(s)/formoterol 4.5 MICROgram(s) Inhaler 2 Puff(s) Inhalation two times a day  dextrose 40% Gel 15 Gram(s) Oral once  dextrose 5%. 1000 milliLiter(s) (50 mL/Hr) IV Continuous <Continuous>  dextrose 5%. 1000 milliLiter(s) (100 mL/Hr) IV Continuous <Continuous>  dextrose 50% Injectable 25 Gram(s) IV Push once  dextrose 50% Injectable 12.5 Gram(s) IV Push once  dextrose 50% Injectable 25 Gram(s) IV Push once  furosemide    Tablet 40 milliGRAM(s) Oral two times a day  glucagon  Injectable 1 milliGRAM(s) IntraMuscular once  hydrALAZINE 10 milliGRAM(s) Oral three times a day  insulin glargine Injectable (LANTUS) 30 Unit(s) SubCutaneous at bedtime  insulin lispro (ADMELOG) corrective regimen sliding scale   SubCutaneous three times a day before meals  insulin lispro Injectable (ADMELOG) 10 Unit(s) SubCutaneous before breakfast  insulin lispro Injectable (ADMELOG) 10 Unit(s) SubCutaneous before lunch  insulin lispro Injectable (ADMELOG) 10 Unit(s) SubCutaneous before dinner  isosorbide   dinitrate Tablet (ISORDIL) 10 milliGRAM(s) Oral three times a day  methylPREDNISolone sodium succinate Injectable 20 milliGRAM(s) IV Push every 8 hours  metoprolol succinate ER 75 milliGRAM(s) Oral daily  rivaroxaban 15 milliGRAM(s) Oral with dinner  simvastatin 40 milliGRAM(s) Oral at bedtime  tamsulosin 0.4 milliGRAM(s) Oral at bedtime    MEDICATIONS  (PRN):      Allergies    No Known Allergies    Intolerances        REVIEW OF SYSTEMS:  CONSTITUTIONAL: no changes  EYES: No eye pain, visual disturbances, or discharge  ENMT:  No difficulty hearing, No sinus or throat pain  NECK: No pain or stiffness  RESPIRATORY: No cough, wheezing, chills or hemoptysis; No shortness of breath  CARDIOVASCULAR: No chest pain, palpitations or leg swelling  GASTROINTESTINAL: No abdominal or epigastric pain. No nausea, vomiting, or hematemesis; No diarrhea or constipation. No melena or hematochezia.  GENITOURINARY: No dysuria, frequency, hematuria, or incontinence  NEUROLOGICAL: No headaches, memory loss, loss of strength, numbness, or tremors  SKIN: No itching, burning, rashes, or lesions   ENDOCRINE: No heat or cold intolerance; No hair loss  MUSCULOSKELETAL: No joint pain or swelling; No muscle, back, or extremity pain  PSYCHIATRIC: No depression, anxiety, mood swings, or difficulty sleeping  HEME/LYMPH: No easy bruising, or bleeding gums  ALLERY AND IMMUNOLOGIC: No hives or eczema    Vital Signs Last 24 Hrs  T(C): 36.3 (15 Jul 2021 16:37), Max: 36.6 (14 Jul 2021 23:51)  T(F): 97.4 (15 Jul 2021 16:37), Max: 97.9 (15 Jul 2021 04:53)  HR: 74 (15 Jul 2021 17:05) (70 - 77)  BP: 145/69 (15 Jul 2021 16:37) (120/71 - 163/78)  BP(mean): 87 (15 Jul 2021 10:25) (87 - 87)  RR: 18 (15 Jul 2021 16:37) (18 - 19)  SpO2: 96% (15 Jul 2021 17:05) (94% - 100%)    PHYSICAL EXAM:  GENERAL:   HEAD: Atraumatic, Normocephalic  EYES: PERRLA, conjunctiva and sclera clear  ENMT: No tonsillar erythema, exudates, or enlargement; Moist mucous membranes, Good dentition, No lesions  NECK: Supple, No JVD, Normal thyroid  NERVOUS SYSTEM:  Alert & Oriented X3, Good concentration; Motor Strength 5/5 B/L upper and lower extremities  CHEST/LUNG: Clear to auscultaion bilaterally; No rales, rhonchi, wheezing, or rubs  HEART: Regular rate and rhythm; No murmurs, rubs, or gallops  ABDOMEN: Soft, Nontender, Nondistended; Bowel sounds present  EXTREMITIES:  2+ Peripheral Pulses, no edema  SKIN: No rashes or lesions    LABS:        CAPILLARY BLOOD GLUCOSE      POCT Blood Glucose.: 358 mg/dL (15 Jul 2021 12:07)  POCT Blood Glucose.: 319 mg/dL (15 Jul 2021 07:30)  POCT Blood Glucose.: 333 mg/dL (14 Jul 2021 21:23)    Lipid panel:           Thyroid:  Diabetes Tests:  Parathyroid Panel:  Adrenals:  RADIOLOGY & ADDITIONAL TESTS:    Imaging Personally Reviewed:  [ ] YES  [ ] NO    Consultant(s) Notes Reviewed:  [ ] YES  [ ] NO    Care Discussed with Consultants/Other Providers [ ] YES  [ ] NO

## 2021-07-15 NOTE — DISCHARGE NOTE PROVIDER - NSDCMRMEDTOKEN_GEN_ALL_CORE_FT
allopurinol 100 mg oral tablet: 2 tab(s) orally once a day  aspirin 81 mg oral tablet: 1 tab(s) orally once a day  folic acid 1 mg oral tablet: 1 tab(s) orally once a day  ipratropium-albuterol 0.5 mg-2.5 mg/3 mLinhalation solution: 3 milliliter(s) inhaled every 6 hours, As Needed SOB wheezing  Januvia 50 mg oral tablet: 1 tab(s) orally once a day  Lasix 40 mg oral tablet: 1 tab(s) orally once a day  Medrol Dosepak 4 mg oral tablet: Follow taper instructions per dosepak instructions.  Metoprolol Succinate ER 50 mg oral tablet, extended release: 1.5 tab(s) orally once a day  nateglinide 120 mg oral tablet: 1 tab(s) orally 3 times a day (before meals)  tamsulosin 0.4 mg oral capsule: 1 cap(s) orally once a day  Toujeo SoloStar 300 units/mL subcutaneous solution: 53  subcutaneous once a day (at bedtime)  Trulicity Pen 1.5 mg/0.5 mL subcutaneous solution: once a week  Ventolin HFA 90 mcg/inh inhalation aerosol: 2 puff(s) inhaled every 6 hours, As Needed sob/wheezing  Xarelto 20 mg oral tablet: 1 tab(s) orally once a day (in the evening)  Zocor 40 mg oral tablet: 1 tab(s) orally once a day (at bedtime)   allopurinol 100 mg oral tablet: 1 tab(s) orally once a day  aspirin 81 mg oral delayed release tablet: 1 tab(s) orally once a day  budesonide-formoterol 160 mcg-4.5 mcg/inh inhalation aerosol: 2 puff(s) inhaled 2 times a day   folic acid 1 mg oral tablet: 1 tab(s) orally once a day  hydrALAZINE 10 mg oral tablet: 1 tab(s) orally 3 times a day  ipratropium-albuterol 0.5 mg-2.5 mg/3 mL inhalation solution: 3 milliliter(s) by nebulizer every 6 hours, As Needed -for shortness of breath and/or wheezing   isosorbide dinitrate 10 mg oral tablet: 1 tab(s) orally 3 times a day  Januvia 50 mg oral tablet: 1 tab(s) orally once a day  Lasix 40 mg oral tablet: 1 tab(s) orally 2 times a day   Metoprolol Succinate ER 50 mg oral tablet, extended release: 1.5 tab(s) orally once a day  nateglinide 120 mg oral tablet: 1 tab(s) orally 3 times a day (before meals)  predniSONE 10 mg oral tablet: 5 tab(s) orally once a day x 1 days, then  4 tab(s) orally once a day x 1 days, then  3 tab(s) orally once a day x 1 days, then  2 tab(s) orally once a day x 1 days, then  1 tab(s) orally once a day x 1 days, then  0.5 table orally once a day x1 day END  rivaroxaban 15 mg oral tablet: 1 tab(s) orally once a day (before a meal)  simvastatin 40 mg oral tablet: 1 tab(s) orally once a day (at bedtime)  tamsulosin 0.4 mg oral capsule: 1 cap(s) orally once a day (at bedtime)  Toujeo SoloStar 300 units/mL subcutaneous solution: 53  subcutaneous once a day (at bedtime)  Trulicity Pen 1.5 mg/0.5 mL subcutaneous solution: once a week  Ventolin HFA 90 mcg/inh inhalation aerosol: 2 puff(s) inhaled every 6 hours, As Needed sob/wheezing

## 2021-07-15 NOTE — PROGRESS NOTE ADULT - NSICDXPILOT_GEN_ALL_CORE
[place]

## 2021-07-15 NOTE — DISCHARGE NOTE PROVIDER - HOSPITAL COURSE
75M hx DM, HTN, HLD, Obesity, COPD (on home o2 2L), CHF, CAD s/p quadruple bypass with PPM/AICD, presenting with difficulty breathing x 2 days with acute worsening this afternoon. EMS called, pt found to be in resp distress; pt placed on cpap and brought to ED. Found to be hypertensive in field to 180s / 90s with rales in bilateral lung fields. Given 5 nitroglycerin sprays, brought bp to 130s/80s.     Acute on chronic hypercarbic respiratory failure: copd and CHF  - Bipap initiated, and transitioned to QHS.  - lasix IVP BID --> PO  - TTE 30%, moderate AR, AS, pHTN      COPD exacerbation:  - started on IV steroids, taper to po steroids of 5-7days   - continue home nebulizers and bipap QHS  - Has home O2    Acute on chronic combined systolic and diastolic CHF:  - EF 35%, mod AR, mod AS  - S/PO AICD  - discharge on PO lasix  -cardiology consulted    Hypertensive urgency:  - BP improved  - Continue current meds    CAD:  - Continue ASA, statin    DM with hyperglycemia:  - Hga1c 8.6  - continue insulin regimen on discharge    CKD stage 3:  - Cr stable at baseline  - Avoid nephrotoxics  - follow up with nephrologist    Elevated LFTs:  - Improved since last admission  - no acute pathology on abd sono    Chronic A fib:  - Rate controlled, on BB  - Continue Xarelto ( renally dosed)    EMBER:  - Continue CPAP    HLD:  - Continue statin    75M hx DM, HTN, HLD, Obesity, COPD (on home o2 2L), CHF, CAD s/p quadruple bypass with PPM/AICD, presenting with difficulty breathing x 2 days with acute worsening this afternoon. EMS called, pt found to be in resp distress; pt placed on cpap and brought to ED. Found to be hypertensive in field to 180s / 90s with rales in bilateral lung fields. Given 5 nitroglycerin sprays, brought bp to 130s/80s.     Acute on chronic hypercarbic respiratory failure: copd and CHF  - Bipap initiated, and transitioned to QHS.  - lasix IVP BID --> PO  - TTE 30%, moderate AR, AS, pHTN    COPD exacerbation:  - started on IV steroids, taper to po steroids of 5-7days   - continue home nebulizers and bipap QHS  - Has home O2    Acute on chronic combined systolic and diastolic CHF:  - EF 35%, mod AR, mod AS  - S/PO AICD  - discharge on PO lasix  -cardiology consulted    Hypertensive urgency:  - BP improved  - Continue current meds    CAD:  - Continue ASA, statin    DM with hyperglycemia:  - Hga1c 8.6  - continue insulin regimen on discharge    CKD stage 3:  - Cr stable at baseline  - Avoid nephrotoxics  - follow up with nephrologist    Elevated LFTs:  - Improved since last admission  - no acute pathology on abd sono    Chronic A fib:  - Rate controlled, on BB  - Continue Xarelto ( renally dosed)    EMBER:  - Continue CPAP    HLD:  - Continue statin    75M hx DM, HTN, HLD, Obesity, COPD (on home o2 2L), CHF, CAD s/p quadruple bypass with PPM/AICD, presenting with difficulty breathing x 2 days with acute worsening this afternoon. EMS called, pt found to be in resp distress; pt placed on cpap and brought to ED. Found to be hypertensive in field to 180s / 90s with rales in bilateral lung fields. Given 5 nitroglycerin sprays, brought bp to 130s/80s.     Acute on chronic hypercarbic respiratory failure: copd and CHF  - Bipap initiated, and transitioned to QHS.  - lasix IVP BID --> PO  - TTE 30%, moderate AR, AS, pHTN    COPD exacerbation:  - started on IV steroids, taper to po steroids of 5-7days   - continue home nebulizers and bipap QHS  - Has home O2  - follow up with Dr. Mack outpatient    Acute on chronic combined systolic and diastolic CHF:  - EF 35%, mod AR, mod AS  - S/PO AICD  - discharge on PO lasix  -cardiology consulted    Hypertensive urgency:  - BP improved  - Continue current meds    CAD:  - Continue ASA, statin    DM with hyperglycemia:  - Hga1c 8.6  - continue insulin regimen on discharge    CKD stage 3:  - Cr stable at baseline  - Avoid nephrotoxics  - follow up with nephrologist    Elevated LFTs:  - Improved since last admission  - no acute pathology on abd sono    Chronic A fib:  - Rate controlled, on BB  - Continue Xarelto ( renally dosed)    EMBER:  - Continue CPAP    HLD:  - Continue statin

## 2021-07-15 NOTE — PROGRESS NOTE ADULT - ASSESSMENT
75y Male with known hx DM, HTN, HLD, Obesity, COPD (on home o2 2L), CHF, CAD s/p 4 vessel CABG with AICD for ischemic cardiomyopathy.  Admitted with respiratory distress x  2 days. EMS found him in resp failure and hypertensive 180s / 90s with rales in bilateral lung fields.    Imp:  Acute on Chronic Systolic/Diastolic Heart Failure  Hypoxemic Hypercarbic Resp Failure  Hypertensive Urgency  Aortic Stenosis  Mild pulmonary hypertension  EF 40-45% mild TR, VA, mod AR, mod -sev AS (similar to previous)    Plan:  Diuresing adequately, on IV Lasix. May change to PO in AM  Continue Metoprolol. ARB/ACE relatively contraindicated due to CKD Stage III.  With improved resp status, BP's have returned to normal.  Would benefit from Hydralzaine/Nitrate combination if BP can tolerate for improved afterload reduction.  Monitor renal function,  daily weights, I&Os.   DM management per medicine and Endocrine  COPD management per Pulm  Stable from CV perspective

## 2021-07-15 NOTE — DISCHARGE NOTE PROVIDER - CARE PROVIDER_API CALL
Diego Mack  INTERNAL MEDICINE  [address], [facility] [address]  [place] [address]  Phone: [phone]  Fax: [phone]  Follow Up Time: 1 week    PCP,   Phone: (   )    -  Fax: (   )    -  Follow Up Time:     Jakub Emmanuel)  Cardiology; Cardiovascular Disease; Nuclear Cardiology  [address] [place] [address]  Phone: [phone]  Fax: [phone]  Follow Up Time:

## 2021-07-15 NOTE — PROGRESS NOTE ADULT - PROBLEM SELECTOR PROBLEM 1
Steroid-induced diabetes mellitus, initial encounter
Steroid-induced diabetes mellitus, initial encounter

## 2021-07-15 NOTE — PROGRESS NOTE ADULT - PROBLEM SELECTOR PLAN 1
steroid induced hyperglycemia   once steroids are decreased expect better finger sticks   Continue with the same regimen while inpatient   care to titrate insulin dose down as the steroid is decreased, as it may expose the patient to sudden hypoglycemia
Continue with the same regimen while inpatient   once steroids are decreased expect better finger sticks

## 2021-07-15 NOTE — PROGRESS NOTE ADULT - PROVIDER SPECIALTY LIST ADULT
Cardiology
Internal Medicine
Pulmonology
Pulmonology
Cardiology
Internal Medicine
Nephrology
Nephrology
Pulmonology
Endocrinology
Infectious Disease
Internal Medicine
Internal Medicine
Nephrology
Pulmonology
Cardiology
Nephrology
Endocrinology

## 2021-07-15 NOTE — PROGRESS NOTE ADULT - SUBJECTIVE AND OBJECTIVE BOX
INTERVAL HPI:  76yo, BM with HTN, HLD, DM, CAD with CABG+ stents, Diastolic CHF, S/P AICD, COPD (on home o2 2L), Obesity, EMBER on CPAP( reports compliance),  Gout.  Smoked close to 40 years, quit 20 years ago.  Presented  with difficulty breathing x 2 days with acute worsening the day of presentation   EMS  found to be in Respiratory  distress, placed on CPAP and brought to ED.   Found to be hypertensive in field to 180s / 90s with rales in bilateral lung fields.   Given 5 nitroglycerin sprays, which brought bp to 130s/80s.   Admitted with acute on chronic hypoxic hypercarbic Respiratory failure and hypertensive urgency.    OVERNIGHT EVENTS:  Awake comfortable.    Vital Signs Last 24 Hrs  T(C): 36.4 (15 Jul 2021 10:25), Max: 36.6 (14 Jul 2021 16:13)  T(F): 97.5 (15 Jul 2021 10:25), Max: 97.9 (14 Jul 2021 16:13)  HR: 70 (15 Jul 2021 11:02) (70 - 78)  BP: 120/71 (15 Jul 2021 10:25) (120/71 - 163/78)  BP(mean): 87 (15 Jul 2021 10:25) (87 - 87)  RR: 19 (15 Jul 2021 10:25) (18 - 19)  SpO2: 98% (15 Jul 2021 11:02) (88% - 100%)    PHYSICAL EXAM:  GEN:         Awake, responsive and comfortable.  HEENT:    Normal.    RESP:       no wheezing.  CVS:          Regular rate and rhythm.   ABD:         Soft, non-tender, non-distended;     MEDICATIONS  (STANDING):  albuterol/ipratropium for Nebulization 3 milliLiter(s) Nebulizer every 6 hours  allopurinol 100 milliGRAM(s) Oral daily  aspirin enteric coated 81 milliGRAM(s) Oral daily  budesonide 160 MICROgram(s)/formoterol 4.5 MICROgram(s) Inhaler 2 Puff(s) Inhalation two times a day  dextrose 40% Gel 15 Gram(s) Oral once  dextrose 5%. 1000 milliLiter(s) (50 mL/Hr) IV Continuous <Continuous>  dextrose 5%. 1000 milliLiter(s) (100 mL/Hr) IV Continuous <Continuous>  dextrose 50% Injectable 25 Gram(s) IV Push once  dextrose 50% Injectable 12.5 Gram(s) IV Push once  dextrose 50% Injectable 25 Gram(s) IV Push once  furosemide   Injectable 40 milliGRAM(s) IV Push two times a day  glucagon  Injectable 1 milliGRAM(s) IntraMuscular once  hydrALAZINE 10 milliGRAM(s) Oral three times a day  insulin glargine Injectable (LANTUS) 30 Unit(s) SubCutaneous at bedtime  insulin lispro (ADMELOG) corrective regimen sliding scale   SubCutaneous three times a day before meals  insulin lispro Injectable (ADMELOG) 10 Unit(s) SubCutaneous before breakfast  insulin lispro Injectable (ADMELOG) 10 Unit(s) SubCutaneous before lunch  insulin lispro Injectable (ADMELOG) 10 Unit(s) SubCutaneous before dinner  isosorbide   dinitrate Tablet (ISORDIL) 10 milliGRAM(s) Oral three times a day  methylPREDNISolone sodium succinate Injectable 20 milliGRAM(s) IV Push every 8 hours  metoprolol succinate ER 75 milliGRAM(s) Oral daily  rivaroxaban 15 milliGRAM(s) Oral with dinner  simvastatin 40 milliGRAM(s) Oral at bedtime  tamsulosin 0.4 milliGRAM(s) Oral at bedtime    LABS:                        14.4   17.95 )-----------( 170      ( 15 Jul 2021 10:51 )             46.6     07-14    141  |  104  |  73<H>  ----------------------------<  200<H>  4.3   |  30  |  2.19<H>    Ca    8.4<L>      14 Jul 2021 08:55  Phos  4.4     07-14  Mg     2.5     07-14    TPro  6.9  /  Alb  3.1<L>  /  TBili  0.6  /  DBili  .19  /  AST  46<H>  /  ALT  72  /  AlkPhos  100  07-14    07-12 @ 06:29  pH: 7.39  pCO2: 50  pO2: 85  SaO2: 96  07-10 @ 23:11  pH: 7.38  pCO2: 49  pO2: 139  SaO2: 99    ASSESSMENT AND PLAN:  ·	Acute on chronic hypoxic Respiratory failure.  ·	Acute on chronic systolic + diastolic CHF.  ·	Aortic Regurgitation+ Stenosis.  ·	Acute COPD exacerbation.  ·	Leukocytosis.  ·	Renal Insuffiencey.  ·	CAD with CABG+ stents.  ·	S/P AICD.  ·	Obesity.  ·	EMBER.  ·	HTN.  ·	HLD.  ·	DM.  ·	Gout.    Clinically better.  On O2 and nocturnal BIPAP.  Continue Symbicort and nebulizer.  For Discharge home.  Follows with out pt pulmonary.

## 2021-07-23 DIAGNOSIS — N18.30 CHRONIC KIDNEY DISEASE, STAGE 3 UNSPECIFIED: ICD-10-CM

## 2021-07-23 DIAGNOSIS — I25.10 ATHEROSCLEROTIC HEART DISEASE OF NATIVE CORONARY ARTERY WITHOUT ANGINA PECTORIS: ICD-10-CM

## 2021-07-23 DIAGNOSIS — E78.5 HYPERLIPIDEMIA, UNSPECIFIED: ICD-10-CM

## 2021-07-23 DIAGNOSIS — I16.0 HYPERTENSIVE URGENCY: ICD-10-CM

## 2021-07-23 DIAGNOSIS — I42.9 CARDIOMYOPATHY, UNSPECIFIED: ICD-10-CM

## 2021-07-23 DIAGNOSIS — Z99.81 DEPENDENCE ON SUPPLEMENTAL OXYGEN: ICD-10-CM

## 2021-07-23 DIAGNOSIS — I25.5 ISCHEMIC CARDIOMYOPATHY: ICD-10-CM

## 2021-07-23 DIAGNOSIS — E11.65 TYPE 2 DIABETES MELLITUS WITH HYPERGLYCEMIA: ICD-10-CM

## 2021-07-23 DIAGNOSIS — E66.01 MORBID (SEVERE) OBESITY DUE TO EXCESS CALORIES: ICD-10-CM

## 2021-07-23 DIAGNOSIS — I48.20 CHRONIC ATRIAL FIBRILLATION, UNSPECIFIED: ICD-10-CM

## 2021-07-23 DIAGNOSIS — I27.20 PULMONARY HYPERTENSION, UNSPECIFIED: ICD-10-CM

## 2021-07-23 DIAGNOSIS — Z95.810 PRESENCE OF AUTOMATIC (IMPLANTABLE) CARDIAC DEFIBRILLATOR: ICD-10-CM

## 2021-07-23 DIAGNOSIS — J44.1 CHRONIC OBSTRUCTIVE PULMONARY DISEASE WITH (ACUTE) EXACERBATION: ICD-10-CM

## 2021-07-23 DIAGNOSIS — Z95.1 PRESENCE OF AORTOCORONARY BYPASS GRAFT: ICD-10-CM

## 2021-07-23 DIAGNOSIS — I13.0 HYPERTENSIVE HEART AND CHRONIC KIDNEY DISEASE WITH HEART FAILURE AND STAGE 1 THROUGH STAGE 4 CHRONIC KIDNEY DISEASE, OR UNSPECIFIED CHRONIC KIDNEY DISEASE: ICD-10-CM

## 2021-07-23 DIAGNOSIS — Z79.01 LONG TERM (CURRENT) USE OF ANTICOAGULANTS: ICD-10-CM

## 2021-07-23 DIAGNOSIS — J96.22 ACUTE AND CHRONIC RESPIRATORY FAILURE WITH HYPERCAPNIA: ICD-10-CM

## 2021-07-23 DIAGNOSIS — I50.43 ACUTE ON CHRONIC COMBINED SYSTOLIC (CONGESTIVE) AND DIASTOLIC (CONGESTIVE) HEART FAILURE: ICD-10-CM

## 2021-07-23 DIAGNOSIS — G47.33 OBSTRUCTIVE SLEEP APNEA (ADULT) (PEDIATRIC): ICD-10-CM

## 2021-07-23 DIAGNOSIS — E11.22 TYPE 2 DIABETES MELLITUS WITH DIABETIC CHRONIC KIDNEY DISEASE: ICD-10-CM

## 2021-07-23 DIAGNOSIS — Z79.82 LONG TERM (CURRENT) USE OF ASPIRIN: ICD-10-CM

## 2021-09-04 ENCOUNTER — INPATIENT (INPATIENT)
Facility: HOSPITAL | Age: 76
LOS: 5 days | Discharge: HOME | End: 2021-09-10
Attending: STUDENT IN AN ORGANIZED HEALTH CARE EDUCATION/TRAINING PROGRAM | Admitting: STUDENT IN AN ORGANIZED HEALTH CARE EDUCATION/TRAINING PROGRAM
Payer: MEDICARE

## 2021-09-04 VITALS
HEART RATE: 74 BPM | WEIGHT: 250 LBS | OXYGEN SATURATION: 92 % | RESPIRATION RATE: 30 BRPM | HEIGHT: 70.5 IN | SYSTOLIC BLOOD PRESSURE: 162 MMHG | DIASTOLIC BLOOD PRESSURE: 79 MMHG | TEMPERATURE: 98 F

## 2021-09-04 DIAGNOSIS — Z95.1 PRESENCE OF AORTOCORONARY BYPASS GRAFT: Chronic | ICD-10-CM

## 2021-09-04 DIAGNOSIS — Z98.89 OTHER SPECIFIED POSTPROCEDURAL STATES: Chronic | ICD-10-CM

## 2021-09-04 DIAGNOSIS — Z95.0 PRESENCE OF CARDIAC PACEMAKER: Chronic | ICD-10-CM

## 2021-09-04 LAB
ALBUMIN SERPL ELPH-MCNC: 3.2 G/DL — LOW (ref 3.3–5)
ALP SERPL-CCNC: 123 U/L — HIGH (ref 40–120)
ALT FLD-CCNC: 49 U/L — SIGNIFICANT CHANGE UP (ref 12–78)
ANION GAP SERPL CALC-SCNC: 7 MMOL/L — SIGNIFICANT CHANGE UP (ref 5–17)
APTT BLD: 35.1 SEC — SIGNIFICANT CHANGE UP (ref 27.5–35.5)
AST SERPL-CCNC: 77 U/L — HIGH (ref 15–37)
BASE EXCESS BLDA CALC-SCNC: 0.8 MMOL/L — SIGNIFICANT CHANGE UP (ref -2–2)
BASOPHILS # BLD AUTO: 0.06 K/UL — SIGNIFICANT CHANGE UP (ref 0–0.2)
BASOPHILS NFR BLD AUTO: 0.4 % — SIGNIFICANT CHANGE UP (ref 0–2)
BILIRUB SERPL-MCNC: 0.7 MG/DL — SIGNIFICANT CHANGE UP (ref 0.2–1.2)
BLOOD GAS COMMENTS: SIGNIFICANT CHANGE UP
BLOOD GAS COMMENTS: SIGNIFICANT CHANGE UP
BLOOD GAS SOURCE: SIGNIFICANT CHANGE UP
BUN SERPL-MCNC: 27 MG/DL — HIGH (ref 7–23)
CALCIUM SERPL-MCNC: 8.2 MG/DL — LOW (ref 8.5–10.1)
CHLORIDE SERPL-SCNC: 106 MMOL/L — SIGNIFICANT CHANGE UP (ref 96–108)
CO2 SERPL-SCNC: 26 MMOL/L — SIGNIFICANT CHANGE UP (ref 22–31)
CREAT SERPL-MCNC: 1.76 MG/DL — HIGH (ref 0.5–1.3)
EOSINOPHIL # BLD AUTO: 0.13 K/UL — SIGNIFICANT CHANGE UP (ref 0–0.5)
EOSINOPHIL NFR BLD AUTO: 0.8 % — SIGNIFICANT CHANGE UP (ref 0–6)
FLUAV AG NPH QL: SIGNIFICANT CHANGE UP
FLUBV AG NPH QL: SIGNIFICANT CHANGE UP
GLUCOSE BLDC GLUCOMTR-MCNC: 276 MG/DL — HIGH (ref 70–99)
GLUCOSE BLDC GLUCOMTR-MCNC: 312 MG/DL — HIGH (ref 70–99)
GLUCOSE BLDC GLUCOMTR-MCNC: 360 MG/DL — HIGH (ref 70–99)
GLUCOSE BLDC GLUCOMTR-MCNC: 392 MG/DL — HIGH (ref 70–99)
GLUCOSE SERPL-MCNC: 351 MG/DL — HIGH (ref 70–99)
HCO3 BLDA-SCNC: 26 MMOL/L — SIGNIFICANT CHANGE UP (ref 21–29)
HCT VFR BLD CALC: 40.3 % — SIGNIFICANT CHANGE UP (ref 39–50)
HGB BLD-MCNC: 13.1 G/DL — SIGNIFICANT CHANGE UP (ref 13–17)
HOROWITZ INDEX BLDA+IHG-RTO: 100 — SIGNIFICANT CHANGE UP
IMM GRANULOCYTES NFR BLD AUTO: 0.9 % — SIGNIFICANT CHANGE UP (ref 0–1.5)
INR BLD: 1.71 RATIO — HIGH (ref 0.88–1.16)
LACTATE SERPL-SCNC: 2.1 MMOL/L — HIGH (ref 0.7–2)
LYMPHOCYTES # BLD AUTO: 1.39 K/UL — SIGNIFICANT CHANGE UP (ref 1–3.3)
LYMPHOCYTES # BLD AUTO: 8.2 % — LOW (ref 13–44)
MCHC RBC-ENTMCNC: 30.5 PG — SIGNIFICANT CHANGE UP (ref 27–34)
MCHC RBC-ENTMCNC: 32.5 GM/DL — SIGNIFICANT CHANGE UP (ref 32–36)
MCV RBC AUTO: 93.9 FL — SIGNIFICANT CHANGE UP (ref 80–100)
MONOCYTES # BLD AUTO: 0.88 K/UL — SIGNIFICANT CHANGE UP (ref 0–0.9)
MONOCYTES NFR BLD AUTO: 5.2 % — SIGNIFICANT CHANGE UP (ref 2–14)
NEUTROPHILS # BLD AUTO: 14.3 K/UL — HIGH (ref 1.8–7.4)
NEUTROPHILS NFR BLD AUTO: 84.5 % — HIGH (ref 43–77)
NRBC # BLD: 0 /100 WBCS — SIGNIFICANT CHANGE UP (ref 0–0)
NT-PROBNP SERPL-SCNC: 3857 PG/ML — HIGH (ref 0–450)
PCO2 BLDA: 47 MMHG — HIGH (ref 32–46)
PH BLD: 7.37 — SIGNIFICANT CHANGE UP (ref 7.35–7.45)
PLATELET # BLD AUTO: 124 K/UL — LOW (ref 150–400)
PO2 BLDA: 313 MMHG — HIGH (ref 74–108)
POTASSIUM SERPL-MCNC: 4.1 MMOL/L — SIGNIFICANT CHANGE UP (ref 3.5–5.3)
POTASSIUM SERPL-SCNC: 4.1 MMOL/L — SIGNIFICANT CHANGE UP (ref 3.5–5.3)
PROT SERPL-MCNC: 6.7 GM/DL — SIGNIFICANT CHANGE UP (ref 6–8.3)
PROTHROM AB SERPL-ACNC: 19.3 SEC — HIGH (ref 10.6–13.6)
RBC # BLD: 4.29 M/UL — SIGNIFICANT CHANGE UP (ref 4.2–5.8)
RBC # FLD: 17.6 % — HIGH (ref 10.3–14.5)
SAO2 % BLDA: 100 % — HIGH (ref 92–96)
SARS-COV-2 RNA SPEC QL NAA+PROBE: SIGNIFICANT CHANGE UP
SODIUM SERPL-SCNC: 139 MMOL/L — SIGNIFICANT CHANGE UP (ref 135–145)
TROPONIN I SERPL-MCNC: 0.26 NG/ML — HIGH (ref 0.01–0.04)
WBC # BLD: 16.92 K/UL — HIGH (ref 3.8–10.5)
WBC # FLD AUTO: 16.92 K/UL — HIGH (ref 3.8–10.5)

## 2021-09-04 PROCEDURE — 71045 X-RAY EXAM CHEST 1 VIEW: CPT | Mod: 26

## 2021-09-04 PROCEDURE — 99221 1ST HOSP IP/OBS SF/LOW 40: CPT

## 2021-09-04 PROCEDURE — 99222 1ST HOSP IP/OBS MODERATE 55: CPT

## 2021-09-04 PROCEDURE — 99291 CRITICAL CARE FIRST HOUR: CPT

## 2021-09-04 PROCEDURE — 93010 ELECTROCARDIOGRAM REPORT: CPT

## 2021-09-04 RX ORDER — DEXTROSE 50 % IN WATER 50 %
25 SYRINGE (ML) INTRAVENOUS ONCE
Refills: 0 | Status: DISCONTINUED | OUTPATIENT
Start: 2021-09-04 | End: 2021-09-10

## 2021-09-04 RX ORDER — SODIUM CHLORIDE 9 MG/ML
1000 INJECTION, SOLUTION INTRAVENOUS
Refills: 0 | Status: DISCONTINUED | OUTPATIENT
Start: 2021-09-04 | End: 2021-09-10

## 2021-09-04 RX ORDER — DEXTROSE 50 % IN WATER 50 %
15 SYRINGE (ML) INTRAVENOUS ONCE
Refills: 0 | Status: DISCONTINUED | OUTPATIENT
Start: 2021-09-04 | End: 2021-09-10

## 2021-09-04 RX ORDER — ISOSORBIDE MONONITRATE 60 MG/1
60 TABLET, EXTENDED RELEASE ORAL DAILY
Refills: 0 | Status: DISCONTINUED | OUTPATIENT
Start: 2021-09-04 | End: 2021-09-10

## 2021-09-04 RX ORDER — RIVAROXABAN 15 MG-20MG
15 KIT ORAL
Refills: 0 | Status: DISCONTINUED | OUTPATIENT
Start: 2021-09-04 | End: 2021-09-10

## 2021-09-04 RX ORDER — ASPIRIN/CALCIUM CARB/MAGNESIUM 324 MG
81 TABLET ORAL DAILY
Refills: 0 | Status: DISCONTINUED | OUTPATIENT
Start: 2021-09-04 | End: 2021-09-10

## 2021-09-04 RX ORDER — INSULIN LISPRO 100/ML
3 VIAL (ML) SUBCUTANEOUS
Refills: 0 | Status: DISCONTINUED | OUTPATIENT
Start: 2021-09-04 | End: 2021-09-05

## 2021-09-04 RX ORDER — INSULIN GLARGINE 100 [IU]/ML
40 INJECTION, SOLUTION SUBCUTANEOUS AT BEDTIME
Refills: 0 | Status: DISCONTINUED | OUTPATIENT
Start: 2021-09-04 | End: 2021-09-09

## 2021-09-04 RX ORDER — FUROSEMIDE 40 MG
40 TABLET ORAL
Refills: 0 | Status: DISCONTINUED | OUTPATIENT
Start: 2021-09-04 | End: 2021-09-05

## 2021-09-04 RX ORDER — SIMVASTATIN 20 MG/1
40 TABLET, FILM COATED ORAL AT BEDTIME
Refills: 0 | Status: DISCONTINUED | OUTPATIENT
Start: 2021-09-04 | End: 2021-09-10

## 2021-09-04 RX ORDER — DEXTROSE 50 % IN WATER 50 %
12.5 SYRINGE (ML) INTRAVENOUS ONCE
Refills: 0 | Status: DISCONTINUED | OUTPATIENT
Start: 2021-09-04 | End: 2021-09-10

## 2021-09-04 RX ORDER — ALLOPURINOL 300 MG
100 TABLET ORAL ONCE
Refills: 0 | Status: COMPLETED | OUTPATIENT
Start: 2021-09-04 | End: 2021-09-04

## 2021-09-04 RX ORDER — ALBUTEROL 90 UG/1
2 AEROSOL, METERED ORAL EVERY 8 HOURS
Refills: 0 | Status: DISCONTINUED | OUTPATIENT
Start: 2021-09-04 | End: 2021-09-10

## 2021-09-04 RX ORDER — GLUCAGON INJECTION, SOLUTION 0.5 MG/.1ML
1 INJECTION, SOLUTION SUBCUTANEOUS ONCE
Refills: 0 | Status: DISCONTINUED | OUTPATIENT
Start: 2021-09-04 | End: 2021-09-10

## 2021-09-04 RX ORDER — FUROSEMIDE 40 MG
60 TABLET ORAL ONCE
Refills: 0 | Status: COMPLETED | OUTPATIENT
Start: 2021-09-04 | End: 2021-09-04

## 2021-09-04 RX ORDER — METOPROLOL TARTRATE 50 MG
50 TABLET ORAL DAILY
Refills: 0 | Status: DISCONTINUED | OUTPATIENT
Start: 2021-09-04 | End: 2021-09-10

## 2021-09-04 RX ORDER — HYDRALAZINE HCL 50 MG
10 TABLET ORAL THREE TIMES A DAY
Refills: 0 | Status: DISCONTINUED | OUTPATIENT
Start: 2021-09-04 | End: 2021-09-10

## 2021-09-04 RX ORDER — TAMSULOSIN HYDROCHLORIDE 0.4 MG/1
0.4 CAPSULE ORAL AT BEDTIME
Refills: 0 | Status: DISCONTINUED | OUTPATIENT
Start: 2021-09-04 | End: 2021-09-10

## 2021-09-04 RX ORDER — INSULIN LISPRO 100/ML
VIAL (ML) SUBCUTANEOUS
Refills: 0 | Status: DISCONTINUED | OUTPATIENT
Start: 2021-09-04 | End: 2021-09-09

## 2021-09-04 RX ORDER — BUDESONIDE AND FORMOTEROL FUMARATE DIHYDRATE 160; 4.5 UG/1; UG/1
2 AEROSOL RESPIRATORY (INHALATION)
Refills: 0 | Status: DISCONTINUED | OUTPATIENT
Start: 2021-09-04 | End: 2021-09-10

## 2021-09-04 RX ADMIN — Medication 40 MILLIGRAM(S): at 17:21

## 2021-09-04 RX ADMIN — Medication 3: at 12:24

## 2021-09-04 RX ADMIN — BUDESONIDE AND FORMOTEROL FUMARATE DIHYDRATE 2 PUFF(S): 160; 4.5 AEROSOL RESPIRATORY (INHALATION) at 17:21

## 2021-09-04 RX ADMIN — Medication 10 MILLIGRAM(S): at 13:03

## 2021-09-04 RX ADMIN — RIVAROXABAN 15 MILLIGRAM(S): KIT at 16:44

## 2021-09-04 RX ADMIN — ALBUTEROL 2 PUFF(S): 90 AEROSOL, METERED ORAL at 15:05

## 2021-09-04 RX ADMIN — ISOSORBIDE MONONITRATE 60 MILLIGRAM(S): 60 TABLET, EXTENDED RELEASE ORAL at 16:44

## 2021-09-04 RX ADMIN — Medication 20 MILLIGRAM(S): at 16:44

## 2021-09-04 RX ADMIN — Medication 50 MILLIGRAM(S): at 13:03

## 2021-09-04 RX ADMIN — Medication 60 MILLIGRAM(S): at 07:02

## 2021-09-04 RX ADMIN — Medication 5: at 16:43

## 2021-09-04 RX ADMIN — ALBUTEROL 2 PUFF(S): 90 AEROSOL, METERED ORAL at 22:25

## 2021-09-04 RX ADMIN — Medication 10 MILLIGRAM(S): at 22:24

## 2021-09-04 RX ADMIN — INSULIN GLARGINE 40 UNIT(S): 100 INJECTION, SOLUTION SUBCUTANEOUS at 22:24

## 2021-09-04 RX ADMIN — Medication 100 MILLIGRAM(S): at 12:24

## 2021-09-04 RX ADMIN — TAMSULOSIN HYDROCHLORIDE 0.4 MILLIGRAM(S): 0.4 CAPSULE ORAL at 22:24

## 2021-09-04 RX ADMIN — SIMVASTATIN 40 MILLIGRAM(S): 20 TABLET, FILM COATED ORAL at 22:24

## 2021-09-04 RX ADMIN — Medication 3 UNIT(S): at 16:44

## 2021-09-04 RX ADMIN — Medication 81 MILLIGRAM(S): at 12:24

## 2021-09-04 RX ADMIN — Medication 3 UNIT(S): at 12:24

## 2021-09-04 NOTE — CONSULT NOTE ADULT - ASSESSMENT
76yo man with known hx HTN, HLD, DM, COPD (on home o2 2L), chronic systolic CHF with LVEF 40%, CAD s/p 4 vessel CABG with AICD for ischemic cardiomyopathy.  Admitted with worsening respiratory distress and LE edema over the last few days.   EMS found him in resp failure and hypertensive 200/100s with rales in bilateral lung fields.  SPO2 70% RA  Pt placed on CPAP, given NTG spray x 5, lasix.   SPO2 90% on ED arrival.  Still appears SOB but much improved as per pt.  creat baseline 2s; 1.7 currently.  Med and diet compliant as per pt.  BNP 3900  trop 0.2    Imp:  Acute on Chronic Systolic/Diastolic Heart Failure  Hypoxemic Hypercarbic Resp Failure  Hypertensive Urgency  Aortic Stenosis  Mild pulmonary hypertension  EF 40-45% mild TR, AK, mod AR, mod -sev AS    -monitor on tele  -cont diuresis with IV lasix  -monitor lytes and creat; baseline DENISHA   -will review echo.... ?severe AS; valve area 0.8cm2 but gradients moderate  -cont home asa/metoprolol/imdur/statin/xarelto  -cont hydralazine for BP control  -cont home nebs, steroids. and O2 for COPD

## 2021-09-04 NOTE — ED PROVIDER NOTE - PHYSICAL EXAMINATION
GEN: Awake, alert, interactive, + mild distress.   HEAD AND NECK: NC/AT. Airway patent. Neck supple.   EYES:  Clear b/l. EOMI. PERRL.   ENT: Moist mucus membranes.   CARDIAC: Regular rate, regular rhythm. No evident pedal edema.    RESP/CHEST: Increased work of breathing, + crackles throughout.   ABD: Soft, non-distended, non-tender. No rebound, no guarding.   BACK: No midline spinal TTP. No CVAT.   EXTREMITIES: Moving all extremities with no apparent deformities.   SKIN: Warm, intact normal color. No rash. + diaphoretic.   NEURO: AOx3, CN II-XII grossly intact, no focal deficits.   PSYCH: Appropriate mood and affect.

## 2021-09-04 NOTE — H&P ADULT - NSHPPHYSICALEXAM_GEN_ALL_CORE
PHYSICAL EXAMINATION:  Vital Signs Last 24 Hrs  T(C): 36.7 (04 Sep 2021 07:30), Max: 36.9 (04 Sep 2021 06:29)  T(F): 98 (04 Sep 2021 07:30), Max: 98.5 (04 Sep 2021 06:29)  HR: 71 (04 Sep 2021 08:14) (70 - 74)  BP: 123/61 (04 Sep 2021 07:30) (118/61 - 162/79)  BP(mean): --  RR: 19 (04 Sep 2021 07:30) (18 - 30)  SpO2: 98% (04 Sep 2021 08:14) (92% - 100%)  CAPILLARY BLOOD GLUCOSE      POCT Blood Glucose.: 392 mg/dL (04 Sep 2021 06:26)        GENERAL: NAD, well-groomed,  HEAD:  atraumatic, normocephalic  EYES: sclera anicteric  ENMT: mucous membranes moist  NECK: supple, No JVD  CHEST/LUNG: clear to auscultation bilaterally;    no      rales   ,   no rhonchi,   HEART: normal S1, S2  ABDOMEN: BS+, soft, ND, NT   EXTREMITIES:    no    edema    b/l LEs  NEURO: awake, ,     moves all extremities  SKIN: no     rash

## 2021-09-04 NOTE — CONSULT NOTE ADULT - SUBJECTIVE AND OBJECTIVE BOX
Patient is a 75y old  Male who presents with a chief complaint of sob (04 Sep 2021 16:43)    HPI: 74yo, BM with HTN, HLD, DM, CAD with CABG+ stents, Diastolic CHF, S/P AICD, Chronic A Fib( per Cholo Enriquez note on 2021), COPD (on home o2 2L), Obesity, EMBER on CPAP( reports compliance),  Gout.  Smoked close to 40 years, quit 20 years ago.  Brought in for worsening SOB over several days, when asked for trigger , says was drinking too much water+ soda as weather was hot and humid.  EMS reported SPO2 of 76% on room air and BP of 190/100.  Placed on BIPAP by EMS and givem NTG spray x5.  SPO2 90% upon arrival to ED.  Denies fever, chills, new cough or sputum production.  Admitted with acute on chronic hypoxic hypercarbic Respiratory failure due to decompensated CHF.    PAST MEDICAL & SURGICAL HISTORY:  HTN (hypertension)    DM (diabetes mellitus), type 2    High cholesterol    CHF (congestive heart failure)    Pneumonia    COPD (chronic obstructive pulmonary disease)    Pacemaker    AICD (automatic cardioverter/defibrillator) present    S/P CABG x 3  cabg3  in     S/P cardiac pacemaker procedure  defibrilator     S/P cardiac cath  cardiac stentin     S/P hernia repair  hernia [id number]    FAMILY HISTORY:  No pertinent family history in first degree relatives    SOCIAL HISTORY: BMI (kg/m2): 35 ( @ 12:07). Ex smoker    Allergies  No Known Allergies    MEDICATIONS  (STANDING):  ALBUTerol    90 MICROgram(s) HFA Inhaler 2 Puff(s) Inhalation every 8 hours  aspirin enteric coated 81 milliGRAM(s) Oral daily  budesonide 160 MICROgram(s)/formoterol 4.5 MICROgram(s) Inhaler 2 Puff(s) Inhalation two times a day  dextrose 40% Gel 15 Gram(s) Oral once  dextrose 5%. 1000 milliLiter(s) (50 mL/Hr) IV Continuous <Continuous>  dextrose 5%. 1000 milliLiter(s) (100 mL/Hr) IV Continuous <Continuous>  dextrose 50% Injectable 25 Gram(s) IV Push once  dextrose 50% Injectable 12.5 Gram(s) IV Push once  dextrose 50% Injectable 25 Gram(s) IV Push once  furosemide   Injectable 40 milliGRAM(s) IV Push two times a day  glucagon  Injectable 1 milliGRAM(s) IntraMuscular once  hydrALAZINE 10 milliGRAM(s) Oral three times a day  insulin glargine Injectable (LANTUS) 40 Unit(s) SubCutaneous at bedtime  insulin lispro (ADMELOG) corrective regimen sliding scale   SubCutaneous three times a day before meals  insulin lispro Injectable (ADMELOG) 3 Unit(s) SubCutaneous three times a day before meals  isosorbide   mononitrate ER Tablet (IMDUR) 60 milliGRAM(s) Oral daily  metoprolol succinate ER 50 milliGRAM(s) Oral daily  predniSONE   Tablet 20 milliGRAM(s) Oral daily  rivaroxaban 15 milliGRAM(s) Oral with dinner  simvastatin 40 milliGRAM(s) Oral at bedtime  tamsulosin 0.4 milliGRAM(s) Oral at bedtime    REVIEW OF SYSTEMS:    Constitutional:            No fever, weight loss or fatigue  HEENT:                     No difficulty hearing, tinnitus, vertigo; No sinus or throat pain  Respiratory:             SOB  Cardiovascular:           No chest pain, palpitations  GI:                            No nausea, vomiting  Genitourinary:            No dysuria, frequency, hematuria or incontinence  SKIN:                             no rash  Musculoskeletal:        No joint pain or swelling  Extremities:                swelling  Neurological:              No headaches  Psychiatric:                 No depression, anxiety    MACRA & MIPS:   Vaccines - Influenza: yes and Pneumovax: yes  Tobacco: ex smoker  Blood Pressure Screening / Control of: 121/73  Current Medications Reviewed: yes    Vital Signs Last 24 Hrs  T(C): 36.7 (04 Sep 2021 16:00), Max: 36.9 (04 Sep 2021 06:29)  T(F): 98.1 (04 Sep 2021 16:00), Max: 98.5 (04 Sep 2021 06:29)  HR: 70 (04 Sep 2021 16:00) (60 - 75)  BP: 121/73 (04 Sep 2021 16:00) (118/61 - 162/79)  BP(mean): --  RR: 18 (04 Sep 2021 16:00) (18 - 30)  SpO2: 97% (04 Sep 2021 16:00) (92% - 100%)    PHYSICAL EXAM:  GEN:         Awake, responsive, dyspneic.  HEENT:    Normal.    RESP:        decreased air entry.  CVS:           Regular rate and rhythm.   ABD:         Soft, non-tender, non-distended; Obese  SKIN:        Warm and dry.  EXTR:        edema  CNS:         Intact sensory and motor function.  PSYCH:        cooperative, no anxiety or depression    LABS:                        13.1   16.92 )-----------( 124      ( 04 Sep 2021 07:03 )             40.3         139  |  106  |  27<H>  ----------------------------<  351<H>  4.1   |  26  |  1.76<H>    Ca    8.2<L>      04 Sep 2021 07:52    TPro  6.7  /  Alb  3.2<L>  /  TBili  0.7  /  DBili  x   /  AST  77<H>  /  ALT  49  /  AlkPhos  123<H>      PT/INR - ( 04 Sep 2021 07:55 )   PT: 19.3 sec;   INR: 1.71 ratio      PTT - ( 04 Sep 2021 07:55 )  PTT:35.1 sec   @ 06:58  pH: 7.37  pCO2: 47  pO2: 313  SaO2: 100    EKG: pacer rhythm    RADIOLOGY & ADDITIONAL STUDIES:  < from: Xray Chest 1 View-PORTABLE IMMEDIATE (Xray Chest 1 View-PORTABLE IMMEDIATE .) (21 @ 07:13) >  EXAM:  XR CHEST PORTABLE IMMED 1V                          PROCEDURE DATE:  2021      INTERPRETATION:  AP semierect chest on 2021 at 6:45 AM. Patient is short of breath.    Heart enlargement, sternotomy, left-sided defibrillator again noted.    On  of this year there were mild central congestive changes.    Presently there are moderate to severe congestive changes.    IMPRESSION: Markedly increased CHF.    JAKE KAUFMAN MD; Attending Radiologist  This document has been electronically signed. Sep  4 2021 10:43AM  < from: TTE Echo Complete w/o Contrast w/ Doppler (21 @ 10:39) >     EXAM:  ECHO TTE WO CON COMP W DOPP      PROCEDURE DATE:  2021      INTERPRETATION:  TRANSTHORACIC ECHOCARDIOGRAM REPORT    Patient Name:   JO VARELA Patient Location: [facility] Rec #:  [id number]    Accession #:      [id number]  Account #:                    Height:           65.0 in 165.0 cm  YOB: 1945     Weight:           251.3 lb 114.00 kg  Patient Age:    75 years      BSA:              2.18 m²  Patient Gender: M             BP:               130/68 mmHg    Date of Exam:        2021 10:39:55 AM  Sonographer:         JENNIFER  Referring Physician: MICHAEL    Procedure:     2D Echo/Doppler/Color Doppler Complete.  Indications:   Heart failure, unspecified - I50.9  Diagnosis:     Heart failure, unspecified - I50.9  Study Details: Technically difficult study. Study quality was adversely affected                 due to patient obesity, body habitus and COPD.    2D AND M-MODE MEASUREMENTS (normal ranges within parentheses):  Left Ventricle:       Normal   Aorta/Left Atrium:            Normal  IVSd (2D):              1.46 cm (0.7-1.1) Aortic Root (2D):    3.00 cm (2.4-3.7)  LVPWd (2D):             1.18 cm (0.7-1.1) AoV Cusp Separation: 0.65 cm (1.5-2.6)  LVIDd (2D):             6.12 cm (3.4-5.7) Left Atrium (2D):    3.17 cm (1.9-4.0)  LVIDs (2D):             4.20 cm  LV FS (2D):             31.4 %   (>25%)  Relative Wall Thickness  0.39    (<0.42)    SPECTRAL DOPPLER ANALYSIS (where applicable):  Aortic Valve: AoV Max Raphael: 3.19 m/s AoV Peak P.6 mmHg AoV Mean P.1 mmHg    LVOT Vmax: 0.99 m/s LVOT VTI: 0.187 m LVOT Diameter: 1.81 cm    AoV Area, Vmax: 0.80 cm² AoV Area, VTI: 0.87 cm² AoV Area, Vmn: 0.82 cm²    Aortic Insufficiency:  AI Half-time:  360 msec  AI Decel Rate:3.10 m/s²    Tricuspid Valve and PA/RV Systolic Pressure: TR Max Velocity: 2.13 m/s RA Pressure: 5 mmHg RVSP/PASP: 23.1 mmHg    PHYSICIAN INTERPRETATION:  Left Ventricle: The left ventricular internal cavity size is mildly increased.  Global LV systolic function was mildly decreased. Left ventricular ejection fraction, by visual estimation, is 40 to 45%. The left ventricular diastolic function could not be assessed in this study.    LV Wall Scoring:  The mid and apical anterior wall, mid and apical anterior septum, mid and  apical inferior septum, and mid anterolateral segment are hypokinetic.    Right Ventricle: The right ventricular size is mildly enlarged. RV systolic function is mildly reduced.  Left Atrium: Normal left atrial size.  Right Atrium: Normal right atrial size.  Pericardium: There is no evidence of pericardial effusion.  Mitral Valve: Mild thickening and calcification of the anterior and posterior mitral valve leaflets. Mitral leaflet mobility is normal. There is mild mitral annular calcification. Trace mitral valve regurgitation is seen.  Tricuspid Valve: The tricuspid valve is normal in structure. Mild tricuspid regurgitation is visualized.  Aortic Valve: The aortic valve is trileaflet. Moderate to severe aortic stenosis is present. The peak aortic velocity was obtained from the apical view. Moderate aortic valve regurgitation is seen.  Pulmonic Valve: The pulmonic valve was not well visualized. Mild pulmonic valve regurgitation.  Aorta: Aortic root measured at Sinus of Valsalva is normal. There is mild aortic root calcification.  Venous: IVC not well-visualized.  Additional Comments: A pacer wire is visualized in the right atrium and right ventricle.    Summary:   1. Left ventricular ejection fraction,by visual estimation, is 40 to 45%.   2. Technically difficult study.   3. Mildly decreased global left ventricular systolic function.   4. Mid and apical anterior wall, mid and apical anterior septum, mid and apical inferior septum, and mid anterolateral segment are abnormal as described above.   5. Mildly increased left ventricular internal cavity size.   6. The left ventricular diastolic function could not be assessed in this study.   7. There is mild concentric left ventricular hypertrophy.  8. Mildly enlarged right ventricle.   9. Mildly reduced RV systolic function.  10. Normal left atrial size.  11. Normal right atrial size.  12. There is no evidence of pericardial effusion.  13. Mild mitral annular calcification.  14. Mild thickeningand calcification of the anterior and posterior mitral valve leaflets.  15. Trace mitral valve regurgitation.  16. Mild tricuspid regurgitation.  17. Moderate aortic regurgitation.  18. Moderate to severe aortic valve stenosis.  19. Mild pulmonic valve regurgitation.  20. C/w the study of 3-14-21, findings are similar.  21. There is mild aortic root calcification.    Elizabeth Phipps MD FACC, FASE, FACP  Electronically signed on 2021 at 5:03:55 PM    ELIZABETH PHIPPS   This document has been electronically signed. 2021 10:39AM    ASSESSMENT AND PLAN:  ·	Acute on chronic hypoxic Respiratory failure.  ·	Acute on chronic systolic + diastolic CHF.  ·	Aortic Regurgitation+ Stenosis.  ·	Acute COPD exacerbation.  ·	Leukocytosis.  ·	Renal Insuffiencey.  ·	CAD with CABG+ stents.  ·	S/P AICD.  ·	Chronic A Fib  ·	Obesity.  ·	EMBER.  ·	HTN.  ·	HLD.  ·	DM.  ·	Gout.    Supplemental O2 with nocturnal + PRN BIPAP.  IV diuretics to keep in negative balance.  Fluid  restriction.  Continue Symbicort and nebulizer.  On Xarelto for chronic A Fib.
[facility] NEPHROLOGY SERVICES, [facility]  NEPHROLOGY AND HYPERTENSION  [address]  [address]  [place] [address]  [phone]    MD DANIEL GARRETT MD ANDREY GONCHARUK, MD MADHU KORRAPATI, MD YELENA ROSENBERG, MD BINNY KOSHY, MD CHRISTOPHER CAPUTO, MD EDWARD BOVER, MD      Information from chart:  "Patient is a 75y old  Male who presents with a chief complaint of sob (04 Sep 2021 17:14)    HPI:  76yo M   w/ PMH HTN, DM, CHF, COPD, ICD / PPM (on AC)     BIBEMS for   worsening sob over past few  days   Per EMS, SPO2 70% RA ,  /100, crackles    Pt placed on CPAP, given NTG spray x 5. SPO2 90% on ED arrival.   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    PAST MEDICAL & SURGICAL HISTORY:  HTN (hypertension)    DM (diabetes mellitus), type 2    High cholesterol    CHF (congestive heart failure)    Pneumonia    COPD (chronic obstructive pulmonary disease)    Pacemaker    AICD (automatic cardioverter/defibrillator) present    S/P CABG x 3  cabg3  in 2003    S/P cardiac pacemaker procedure  defibrilator 2012    S/P cardiac cath  cardiac stentin 2011    S/P hernia repair  hernia [id number]      FAMILY HISTORY:  No pertinent family history in first degree relatives      Allergies    No Known Allergies    Intolerances      Home Medications:  allopurinol 100 mg oral tablet: 1 tab(s) orally once a day (15 Jul 2021 12:35)  folic acid 1 mg oral tablet: 1 tab(s) orally once a day (13 Mar 2021 15:08)  Januvia 50 mg oral tablet: 1 tab(s) orally once a day (13 Mar 2021 15:08)  nateglinide 120 mg oral tablet: 1 tab(s) orally 3 times a day (before meals) (13 Mar 2021 15:08)  simvastatin 40 mg oral tablet: 1 tab(s) orally once a day (at bedtime) (15 Jul 2021 12:35)  tamsulosin 0.4 mg oral capsule: 1 cap(s) orally once a day (at bedtime) (15 Jul 2021 12:35)  Toujeo SoloStar 300 units/mL subcutaneous solution: 53  subcutaneous once a day (at bedtime) (13 Mar 2021 15:08)  Trulicity Pen 1.5 mg/0.5 mL subcutaneous solution: once a week (13 Mar 2021 15:08)    MEDICATIONS  (STANDING):  ALBUTerol    90 MICROgram(s) HFA Inhaler 2 Puff(s) Inhalation every 8 hours  aspirin enteric coated 81 milliGRAM(s) Oral daily  budesonide 160 MICROgram(s)/formoterol 4.5 MICROgram(s) Inhaler 2 Puff(s) Inhalation two times a day  dextrose 40% Gel 15 Gram(s) Oral once  dextrose 5%. 1000 milliLiter(s) (50 mL/Hr) IV Continuous <Continuous>  dextrose 5%. 1000 milliLiter(s) (100 mL/Hr) IV Continuous <Continuous>  dextrose 50% Injectable 25 Gram(s) IV Push once  dextrose 50% Injectable 12.5 Gram(s) IV Push once  dextrose 50% Injectable 25 Gram(s) IV Push once  furosemide   Injectable 40 milliGRAM(s) IV Push two times a day  glucagon  Injectable 1 milliGRAM(s) IntraMuscular once  hydrALAZINE 10 milliGRAM(s) Oral three times a day  insulin glargine Injectable (LANTUS) 40 Unit(s) SubCutaneous at bedtime  insulin lispro (ADMELOG) corrective regimen sliding scale   SubCutaneous three times a day before meals  insulin lispro Injectable (ADMELOG) 3 Unit(s) SubCutaneous three times a day before meals  isosorbide   mononitrate ER Tablet (IMDUR) 60 milliGRAM(s) Oral daily  metoprolol succinate ER 50 milliGRAM(s) Oral daily  predniSONE   Tablet 20 milliGRAM(s) Oral daily  rivaroxaban 15 milliGRAM(s) Oral with dinner  simvastatin 40 milliGRAM(s) Oral at bedtime  tamsulosin 0.4 milliGRAM(s) Oral at bedtime    MEDICATIONS  (PRN):    Vital Signs Last 24 Hrs  T(C): 36.7 (04 Sep 2021 16:00), Max: 36.9 (04 Sep 2021 06:29)  T(F): 98.1 (04 Sep 2021 16:00), Max: 98.5 (04 Sep 2021 06:29)  HR: 77 (04 Sep 2021 21:14) (60 - 77)  BP: 121/73 (04 Sep 2021 16:00) (118/61 - 162/79)  BP(mean): --  RR: 18 (04 Sep 2021 16:00) (18 - 30)  SpO2: 95% (04 Sep 2021 21:14) (92% - 100%)    Daily Height in cm: 179.07 (04 Sep 2021 12:07)    Daily     09-04-21 @ 07:01  -  09-04-21 @ 22:00  --------------------------------------------------------  IN: 350 mL / OUT: 200 mL / NET: 150 mL      CAPILLARY BLOOD GLUCOSE      POCT Blood Glucose.: 360 mg/dL (04 Sep 2021 16:05)  POCT Blood Glucose.: 276 mg/dL (04 Sep 2021 12:18)  POCT Blood Glucose.: 392 mg/dL (04 Sep 2021 06:26)    PHYSICAL EXAM:      T(C): 36.7 (09-04-21 @ 16:00), Max: 36.9 (09-04-21 @ 06:29)  HR: 77 (09-04-21 @ 21:14) (60 - 77)  BP: 121/73 (09-04-21 @ 16:00) (118/61 - 162/79)  RR: 18 (09-04-21 @ 16:00) (18 - 30)  SpO2: 95% (09-04-21 @ 21:14) (92% - 100%)  Wt(kg): --  Lungs anterior rhonchi  Heart S1S2  Abd soft NT ND  Extremities:   tr edema              09-04    139  |  106  |  27<H>  ----------------------------<  351<H>  4.1   |  26  |  1.76<H>    Ca    8.2<L>      04 Sep 2021 07:52    TPro  6.7  /  Alb  3.2<L>  /  TBili  0.7  /  DBili  x   /  AST  77<H>  /  ALT  49  /  AlkPhos  123<H>  09-04                          13.1   16.92 )-----------( 124      ( 04 Sep 2021 07:03 )             40.3     Creatinine Trend: 1.76<--    ABG - ( 04 Sep 2021 06:58 )  pH, Arterial: x     pH, Blood: 7.37  /  pCO2: 47    /  pO2: 313   / HCO3: 26    / Base Excess: 0.8   /  SaO2: 100                   Assessment   DENISHA CKD 3-4 CHF CRS    Plan  Continued warranted diuresus   Will follow     Jon Rowe MD
CARDIOLOGY CONSULT NOTE    Patient is a 75y Male with a known history of :    HPI:  76yo man with known hx HTN, HLD, DM, COPD (on home o2 2L), chronic systolic CHF with LVEF 40%, CAD s/p 4 vessel CABG with AICD for ischemic cardiomyopathy.    Admitted with worsening respiratory distress and LE edema over the last few days.   EMS found him in resp failure and hypertensive 200/100s with rales in bilateral lung fields.  SPO2 70% RA  Pt placed on CPAP, given NTG spray x 5, lasix.   SPO2 90% on ED arrival.  Still appears SOB but much improved as per pt.  creat baseline 2s; 1.7 currently.  Med and diet compliant as per pt.  BNP 3900  trop 0.2      REVIEW OF SYSTEMS:  CONSTITUTIONAL: No fever, weight loss, or fatigue  EYES: No eye pain, visual disturbances, or discharge  ENMT:  No difficulty hearing, tinnitus, vertigo; No sinus or throat pain  NECK: No pain or stiffness  BREASTS: No pain, masses, or nipple discharge  RESPIRATORY: No cough, wheezing, chills or hemoptysis; + shortness of breath  CARDIOVASCULAR: No chest pain, palpitations, dizziness; + leg swelling  GASTROINTESTINAL: No abdominal or epigastric pain. No nausea, vomiting, or hematemesis; No diarrhea or constipation. No melena or hematochezia.  GENITOURINARY: No dysuria, frequency, hematuria, or incontinence  NEUROLOGICAL: No headaches, memory loss, loss of strength, numbness, or tremors  SKIN: No itching, burning, rashes, or lesions   LYMPH NODES: No enlarged glands  ENDOCRINE: No heat or cold intolerance; No hair loss  MUSCULOSKELETAL: No joint pain or swelling; No muscle, back, or extremity pain  PSYCHIATRIC: No depression, anxiety, mood swings, or difficulty sleeping  HEME/LYMPH: No easy bruising, or bleeding gums  ALLERGY AND IMMUNOLOGIC: No hives or eczema    MEDICATIONS  (STANDING):  ALBUTerol    90 MICROgram(s) HFA Inhaler 2 Puff(s) Inhalation every 8 hours  aspirin enteric coated 81 milliGRAM(s) Oral daily  budesonide 160 MICROgram(s)/formoterol 4.5 MICROgram(s) Inhaler 2 Puff(s) Inhalation two times a day  furosemide   Injectable 40 milliGRAM(s) IV Push two times a day  glucagon  Injectable 1 milliGRAM(s) IntraMuscular once  hydrALAZINE 10 milliGRAM(s) Oral three times a day  insulin glargine Injectable (LANTUS) 40 Unit(s) SubCutaneous at bedtime  insulin lispro (ADMELOG) corrective regimen sliding scale   SubCutaneous three times a day before meals  insulin lispro Injectable (ADMELOG) 3 Unit(s) SubCutaneous three times a day before meals  isosorbide   mononitrate ER Tablet (IMDUR) 60 milliGRAM(s) Oral daily  metoprolol succinate ER 50 milliGRAM(s) Oral daily  predniSONE   Tablet 20 milliGRAM(s) Oral daily  rivaroxaban 15 milliGRAM(s) Oral with dinner  simvastatin 40 milliGRAM(s) Oral at bedtime  tamsulosin 0.4 milliGRAM(s) Oral at bedtime    MEDICATIONS  (PRN):      ALLERGIES: No Known Allergies      FAMILY HISTORY:  No pertinent family history in first degree relatives        PHYSICAL EXAMINATION:  -----------------------------  T(C): 36.7 (09-04-21 @ 16:00), Max: 36.9 (09-04-21 @ 06:29)  HR: 70 (09-04-21 @ 16:00) (60 - 75)  BP: 121/73 (09-04-21 @ 16:00) (118/61 - 162/79)  RR: 18 (09-04-21 @ 16:00) (18 - 30)  SpO2: 97% (09-04-21 @ 16:00) (92% - 100%)      Constitutional: mild resp distress.   Eyes: the conjunctiva exhibited no abnormalities and the eyelids demonstrated no xanthelasmas.   HEENT: normal oral mucosa, no oral pallor and no oral cyanosis.   Neck: normal jugular venous A waves present, normal jugular venous V waves present and no jugular venous nava A waves.   Pulmonary: coarse BS woth mild rales at bases  Cardiovascular: heart rate and rhythm were normal, normal S1 and S2 and no murmur, gallop, rub, heave or thrill are present.   Abdomen: soft, non-tender, no hepato-splenomegaly and no abdominal mass palpated.   Musculoskeletal: the gait could not be assessed..   Extremities: no clubbing of the fingernails, no localized cyanosis, no petechial hemorrhages and no ischemic changes.   Skin: normal skin color and pigmentation, no rash, no venous stasis, no skin lesions, no skin ulcer and no xanthoma was observed.   Psychiatric: oriented to person, place, and time, the affect was normal, the mood was normal and not feeling anxious.       LABS:   --------  09-04    139  |  106  |  27<H>  ----------------------------<  351<H>  4.1   |  26  |  1.76<H>    Ca    8.2<L>      04 Sep 2021 07:52    TPro  6.7  /  Alb  3.2<L>  /  TBili  0.7  /  DBili  x   /  AST  77<H>  /  ALT  49  /  AlkPhos  123<H>  09-04                         13.1   16.92 )-----------( 124      ( 04 Sep 2021 07:03 )             40.3     PT/INR - ( 04 Sep 2021 07:55 )   PT: 19.3 sec;   INR: 1.71 ratio         PTT - ( 04 Sep 2021 07:55 )  PTT:35.1 sec  09-04 @ 07:52 BNP: 3857 pg/mL    09-04 @ 07:52 CPK total:--, CKMB --, Troponin I - .262 ng/mL<H>          RADIOLOGY:  -----------------    ECG: V-paced    < from: TTE Echo Complete w/o Contrast w/ Doppler (07.13.21 @ 10:39) >  Summary:   1. Left ventricular ejection fraction,by visual estimation, is 40 to 45%.   2. Technically difficult study.   3. Mildly decreased global left ventricular systolic function.   4. Mid and apical anterior wall, mid and apical anterior septum, mid and apical inferior septum, and mid anterolateral segment are abnormal as described above.   5. Mildly increased left ventricular internal cavity size.   6. The left ventricular diastolic function could not be assessed in this study.   7. There is mild concentric left ventricular hypertrophy.  8. Mildly enlarged right ventricle.   9. Mildly reduced RV systolic function.  10. Normal left atrial size.  11. Normal right atrial size.  12. There is no evidence of pericardial effusion.  13. Mild mitral annular calcification.  14. Mild thickeningand calcification of the anterior and posterior mitral valve leaflets.  15. Trace mitral valve regurgitation.  16. Mild tricuspid regurgitation.  17. Moderate aortic regurgitation.  18. Moderate to severe aortic valve stenosis.  19. Mild pulmonic valve regurgitation.  20. C/w the study of 3-14-21, findings are similar.  21. There is mild aortic root calcification.    Jayce Varma MD FACC, FASE, FACP  Electronically signed on 7/13/2021 at 5:03:55 PM    < end of copied text >

## 2021-09-04 NOTE — ED ADULT NURSE NOTE - OBJECTIVE STATEMENT
Patient aox4.  pmhx of HTN, DM, CHF, COPD, ICD / PPM. pt with c/o SOB x 1hr. as per EMS, SPO2 70% on room air and /100. Pt placed on CPAP by ems, given NTG spray x 5. Pt diaphoretic. Pt unable to give complete hx, due to sob.

## 2021-09-04 NOTE — ED PROVIDER NOTE - CLINICAL SUMMARY MEDICAL DECISION MAKING FREE TEXT BOX
74yo M w/ PMH HTN, DM, CHF, COPD, ICD / PPM (on AC) BIBEMS for SOB x 1hr. + diaphoretic, increased work of breathing, crackles throughout. Placed on BiPAP on ED arrival. Plan: Give Lasix, ECG, trop, CXR, CBC, CMP, BNP, Flu / COVID. Re-eval. Pt care signed out at change of shift.

## 2021-09-04 NOTE — ED ADULT NURSE NOTE - NSICDXPASTSURGICALHX_GEN_ALL_CORE_FT
PAST SURGICAL HISTORY:  S/P CABG x 3 cabg3  in 2003    S/P cardiac cath cardiac stentin 2011    S/P cardiac pacemaker procedure defibrilator 2012    S/P hernia repair hernia [id number]

## 2021-09-04 NOTE — ED PROVIDER NOTE - CRITICAL CARE ATTENDING CONTRIBUTION TO CARE
Upon my evaluation, this patient had a high probability of imminent or life-threatening deterioration due to CHF, which required my direct attention, intervention, and personal management.  The patient has a medical condition that impairs on or more vital organ systems.  Frequent personal assessment and adjustment of medical interventions was performed.     I have personally provided 30 minutes of critical care time exclusive of time spent on separately billable procedures.  Time includes review of laboratory data, radiology results, discussion with consultants, patient and family; monitoring for potential decompensation, as well as time spent retrieving data and reviewing the chart and documenting the visit.  Interventions we performed as documented above.

## 2021-09-04 NOTE — ED PROVIDER NOTE - CROS ED ROS STATEMENT
Procedures by Josephine Mcdonough MD at 9/2/2016   9:56 AM      Author:  Josephine Mcdonough MD Service:  Surgery-General Author Type:  Physician     Filed:  9/2/2016  9:59 AM Date of Service:  9/2/2016  9:56 AM Status:  Signed     :  Josephine Mcdonough MD (Physician)         Pre-procedure Diagnoses:       1  Decubitus ulcer of right ischial area, unstageable [L89 310]                Post-procedure Diagnoses:       1  Decubitus ulcer of right ischial area, unstageable [L89 310]                Procedures:       1  PA DEBRIDEMENT, SKIN, SUB-Q TISSUE,MUSCLE,=<20 SQ CM [60003 (CPT®)]       2  PA DEBRIDEMENT, SKIN, SUB-Q TISSUE,MUSCLE,EACH ADD 20 SQ CM [89287 (CPT®)]                   Verbal consent obtained  Excisional debridement of skin subcutaneous and muscle was performed    Total of 24 cm²  10 blade scalpel used  Minimal bleeding  Patient tolerated procedure well  No complications  Dressings applied           Received for:Kerwin Rivas MD  Sep  2 2016 10:00AM Eastern Standard Time all other ROS negative except as per HPI

## 2021-09-04 NOTE — ED ADULT NURSE NOTE - ED STAT RN HANDOFF DETAILS
report given to KEVAN Padilla. Safety measures , maintained. all 3 ivs intact. patient aox4. on bipap. v/s stable.

## 2021-09-04 NOTE — H&P ADULT - HISTORY OF PRESENT ILLNESS
76yo M   w/ PMH HTN, DM, CHF, COPD, ICD / PPM (on AC)   BIBEMS for SOB x 1hr.   Per EMS, SPO2 70% RA on arrival, /100, crackles throughout.   Pt placed on CPAP, given NTG spray x 5. SPO2 90% on ED arrival.  . States this is 3rd similar ED visit this yr   for  chf   on Lasix 40mg BID 74yo M   w/ PMH HTN, DM, CHF, COPD, ICD / PPM (on AC)     BIBEMS for   worsening sob over past few  days   Per EMS, SPO2 70% RA ,  /100, crackles    Pt placed on CPAP, given NTG spray x 5. SPO2 90% on ED arrival.   on Lasix 40mg BID at home   has ha d prior visits for  chf  seen in er,  comfortable  on  bipap   resp therapist at  bedside

## 2021-09-04 NOTE — H&P ADULT - NSHPLABSRESULTS_GEN_ALL_CORE
LABS:                        13.1   16.92 )-----------( 124      ( 04 Sep 2021 07:03 )             40.3     09-04    139  |  106  |  27<H>  ----------------------------<  351<H>  4.1   |  26  |  1.76<H>    Ca    8.2<L>      04 Sep 2021 07:52    TPro  6.7  /  Alb  3.2<L>  /  TBili  0.7  /  DBili  x   /  AST  77<H>  /  ALT  49  /  AlkPhos  123<H>  09-04    PT/INR - ( 04 Sep 2021 07:55 )   PT: 19.3 sec;   INR: 1.71 ratio         PTT - ( 04 Sep 2021 07:55 )  PTT:35.1 sec  CARDIAC MARKERS ( 04 Sep 2021 07:52 )  .262 ng/mL / x     / x     / x     / x            Lactate, Blood: 2.1 mmol/L (09-04 @ 07:04)    ABG - ( 04 Sep 2021 06:58 )  pH, Arterial: x     pH, Blood: 7.37  /  pCO2: 47    /  pO2: 313   / HCO3: 26    / Base Excess: 0.8   /  SaO2: 100

## 2021-09-04 NOTE — ED PROVIDER NOTE - PROGRESS NOTE DETAILS
Pt was seen and treated by Dr. Carter labs and admission are pending. Pt is alert and oriented x 3 bp 127/61 hr 73, rr 16 pox 96 % bipap. Dr. López is notified and admit pt. Pt is taking prednisone according to sun [place] pt always has elevated wbc.

## 2021-09-04 NOTE — ED PROVIDER NOTE - OBJECTIVE STATEMENT
76yo M w/ PMH HTN, DM, CHF, COPD, ICD / PPM (on AC) BIBEMS for SOB x 1hr. Per EMS, SPO2 70% RA on arrival, /100, crackles throughout. Pt placed on CPAP, given NTG spray x 5. SPO2 90% on ED arrival. Pt diaphoretic. States this is 3rd similar ED visit this yr d/t similar sx.     NKDA. Meds as listed (Lasix 40mg BID)

## 2021-09-04 NOTE — ED ADULT NURSE NOTE - ED STAT RN HANDOFF DETAILS 2
Report received from RN at this time. Assessment available on [facility]. will continue to monitor

## 2021-09-04 NOTE — H&P ADULT - ASSESSMENT
75M           hx DM, HTN, HLD, Obesity, COPD (on home o2 2L)         , CHF, CAD s/p quadruple bypass with PPM/AICD,           chronic hypercarbic respiratory failure:     admitted  with  worsening sob,  from  acute  on  chronic  systolic  chf    has  AICD   +  troponin. from  ckd/  acute  chf, and  not  from mi   elevated  bnp  tele  monitoring  on iv lasix    echo, ef   35. mod  AS.    card  called by  er    COPD , on symbicort   - Has home O2    CAD/  HLD :   on  ASA, statin    DM with hyperglycemia  on lantus,  follow  fs     CKD stage 3:  - Cr stable at baseline    EMBER:  - Continue CPAP.  nocturnal   Morbid obesity,  bmi   35   dvt  ppx/  on xarelto      dv< from: TTE Echo Complete w/o Contrast w/ Doppler (07.13.21 @ 10:39) >  Summary:   1. Left ventricular ejection fraction,by visual estimation, is 40 to 45%.   2. Technically difficult study.   3. Mildly decreased global left ventricular systolic function.   4. Mid and apical anterior wall, mid and apical anterior septum, mid and apical inferior septum, and mid anterolateral segment are abnormal as described above.   5. Mildly increased left ventricular internal cavity size.   6. The left ventricular diastolic function could not be assessed in this study.   7. There is mild concentric left ventricular hypertrophy.  8. Mildly enlarged right ventricle.   9. Mildly reduced RV systolic function.  10. Normal left atrial size.  11. Normal right atrial size.  12. There is no evidence of pericardial effusion.  13. Mild mitral annular calcification.  14. Mild thickeningand calcification of the anterior and posterior mitral valve leaflets.  15. Trace mitral valve regurgitation.  16. Mild tricuspid regurgitation.  17. Moderate aortic regurgitation.  18. Moderate to severe aortic valve stenosis.  19. Mild pulmonic valve regurgitation.  20. C/w the study of 3-14-21, findings are similar.  21. There is mild aortic root calcification.  < end of copied text >         ra 75M           hx DM, HTN, HLD, Obesity, COPD  on home o2L          c/c  systolic , CHF, CAD s/p quadruple bypass          AFIb on xarelto,  PPM/AICD,           chronic hypercarbic respiratory failure:     admitted  with  worsening sob        and  acute  hypoxic, hypercarbic resop failure/ place d on bipap/by er       cxr with increased chf    *   acute  on  chronic  systolic  chf       h/o cardiomyopathy,  has  AICD       +  troponin. from  ckd/  acute  chf, and  not  from mi         and has  elevated  bnp/  paced rhythm      tele  monitoring      on iv lasix  , bid      folow  weights     Echo,  from 7/2021,  ef  40 . mod  AS.. RV dysfunction     card  called by  er       *   COPD , on symbicort . prednisone/ albuterol          on  home O2 and  nocturnal  bipap         pulm d r brianna    *  CAD/  HLD :           on   asa/  statin    *   DM with hyperglycemia          on lantus,  follow  fs /  titrate  accordingly    *    CKD stage 3:   *    EMBER:/ obesity hypoventilation /  BMI  35            on  CPAP.  nocturnal             baseline  pCo2  was about 50    *   c/c  leukocytosis in 16,000 range./  suspect  fromstroid         dvt  ppx/  on xarelto    spoke with resp  therapist to titrate  O2         rad      dv< from: TTE Echo Complete w/o Contrast w/ Doppler (07.13.21 @ 10:39) >  Summary:   1. Left ventricular ejection fraction,by visual estimation, is 40 to 45%.   2. Technically difficult study.   3. Mildly decreased global left ventricular systolic function.   4. Mid and apical anterior wall, mid and apical anterior septum, mid and apical inferior septum, and mid anterolateral segment are abnormal as described above.   5. Mildly increased left ventricular internal cavity size.   6. The left ventricular diastolic function could not be assessed in this study.   7. There is mild concentric left ventricular hypertrophy.  8. Mildly enlarged right ventricle.   9. Mildly reduced RV systolic function.  10. Normal left atrial size.  11. Normal right atrial size.  12. There is no evidence of pericardial effusion.  13. Mild mitral annular calcification.  14. Mild thickeningand calcification of the anterior and posterior mitral valve leaflets.  15. Trace mitral valve regurgitation.  16. Mild tricuspid regurgitation.  17. Moderate aortic regurgitation.  18. Moderate to severe aortic valve stenosis.  19. Mild pulmonic valve regurgitation.  20. C/w the study of 3-14-21, findings are similar.  21. There is mild aortic root calcification.  < end of copied text >         ra 75M           hx DM, HTN, HLD, Obesity, COPD  on home o2L          c/c  systolic , CHF, CAD  stent in 2011,  s/p quadruple bypass           AFIb on xarelto,  PPM/AICD,           chronic hypercarbic respiratory failure:     admitted  with  worsening sob      *   acute  hypoxic, hypercarbic resop failure, on arrival,  place on bipap/by er       cxr with increased chf       acute  on  chronic  systolic  chf       h/o cardiomyopathy,  has  AICD       +  troponin. from  ckd/  acute  chf, and  not  from mi         and has  elevated  bnp/  paced rhythm      tele  monitoring      on iv lasix  , bid      folow  weights      Echo,  from 7/2021,  ef  40 . mod  AS.. RV dysfunction      card  called by  er       *   COPD , on symbicort . prednisone/ albuterol          on  home O2 and  nocturnal  bipap         pulm d r brianna    *  CAD/  HLD :           on   asa/  statin    *   DM with hyperglycemia          on lantus,  follow  fs /  titrate  accordingly    *    CKD stage 3:/  dr cr, known to  pt   *    EMBER:/ obesity hypoventilation /  BMI  35            on  CPAP.  nocturnal             baseline  pCo2  was about 50    *   c/c  leukocytosis in 16,000 range./  suspect  from steroid         dvt  ppx/  on xarelto    spoke with resp  therapist to titrate  O2  on asa/ toprol/ hydralazine/ imdur/ lasix/ zocor/ lantus      dv< from: TTE Echo Complete w/o Contrast w/ Doppler (07.13.21 @ 10:39) >  Summary:   1. Left ventricular ejection fraction,by visual estimation, is 40 to 45%.   2. Technically difficult study.   3. Mildly decreased global left ventricular systolic function.   4. Mid and apical anterior wall, mid and apical anterior septum, mid and apical inferior septum, and mid anterolateral segment are abnormal as described above.   5. Mildly increased left ventricular internal cavity size.   6. The left ventricular diastolic function could not be assessed in this study.   7. There is mild concentric left ventricular hypertrophy.  8. Mildly enlarged right ventricle.   9. Mildly reduced RV systolic function.  10. Normal left atrial size.  11. Normal right atrial size.  12. There is no evidence of pericardial effusion.  13. Mild mitral annular calcification.  14. Mild thickeningand calcification of the anterior and posterior mitral valve leaflets.  15. Trace mitral valve regurgitation.  16. Mild tricuspid regurgitation.  17. Moderate aortic regurgitation.  18. Moderate to severe aortic valve stenosis.  19. Mild pulmonic valve regurgitation.  20. C/w the study of 3-14-21, findings are similar.  21. There is mild aortic root calcification.  < end of copied text >         ra

## 2021-09-05 LAB
ANION GAP SERPL CALC-SCNC: 2 MMOL/L — LOW (ref 5–17)
BUN SERPL-MCNC: 37 MG/DL — HIGH (ref 7–23)
CALCIUM SERPL-MCNC: 8.5 MG/DL — SIGNIFICANT CHANGE UP (ref 8.5–10.1)
CHLORIDE SERPL-SCNC: 103 MMOL/L — SIGNIFICANT CHANGE UP (ref 96–108)
CO2 SERPL-SCNC: 33 MMOL/L — HIGH (ref 22–31)
CREAT SERPL-MCNC: 2.1 MG/DL — HIGH (ref 0.5–1.3)
GLUCOSE BLDC GLUCOMTR-MCNC: 391 MG/DL — HIGH (ref 70–99)
GLUCOSE SERPL-MCNC: 389 MG/DL — HIGH (ref 70–99)
HCT VFR BLD CALC: 35.6 % — LOW (ref 39–50)
HGB BLD-MCNC: 11.3 G/DL — LOW (ref 13–17)
MCHC RBC-ENTMCNC: 29.9 PG — SIGNIFICANT CHANGE UP (ref 27–34)
MCHC RBC-ENTMCNC: 31.7 GM/DL — LOW (ref 32–36)
MCV RBC AUTO: 94.2 FL — SIGNIFICANT CHANGE UP (ref 80–100)
NRBC # BLD: 0 /100 WBCS — SIGNIFICANT CHANGE UP (ref 0–0)
PLATELET # BLD AUTO: 104 K/UL — LOW (ref 150–400)
POTASSIUM SERPL-MCNC: 5 MMOL/L — SIGNIFICANT CHANGE UP (ref 3.5–5.3)
POTASSIUM SERPL-SCNC: 5 MMOL/L — SIGNIFICANT CHANGE UP (ref 3.5–5.3)
RBC # BLD: 3.78 M/UL — LOW (ref 4.2–5.8)
RBC # FLD: 17.4 % — HIGH (ref 10.3–14.5)
SODIUM SERPL-SCNC: 138 MMOL/L — SIGNIFICANT CHANGE UP (ref 135–145)
WBC # BLD: 11.24 K/UL — HIGH (ref 3.8–10.5)
WBC # FLD AUTO: 11.24 K/UL — HIGH (ref 3.8–10.5)

## 2021-09-05 PROCEDURE — 99233 SBSQ HOSP IP/OBS HIGH 50: CPT

## 2021-09-05 RX ORDER — FUROSEMIDE 40 MG
40 TABLET ORAL DAILY
Refills: 0 | Status: DISCONTINUED | OUTPATIENT
Start: 2021-09-06 | End: 2021-09-09

## 2021-09-05 RX ORDER — INSULIN LISPRO 100/ML
6 VIAL (ML) SUBCUTANEOUS
Refills: 0 | Status: DISCONTINUED | OUTPATIENT
Start: 2021-09-05 | End: 2021-09-09

## 2021-09-05 RX ADMIN — ISOSORBIDE MONONITRATE 60 MILLIGRAM(S): 60 TABLET, EXTENDED RELEASE ORAL at 11:11

## 2021-09-05 RX ADMIN — Medication 6: at 16:56

## 2021-09-05 RX ADMIN — Medication 5: at 08:09

## 2021-09-05 RX ADMIN — TAMSULOSIN HYDROCHLORIDE 0.4 MILLIGRAM(S): 0.4 CAPSULE ORAL at 21:48

## 2021-09-05 RX ADMIN — Medication 20 MILLIGRAM(S): at 05:21

## 2021-09-05 RX ADMIN — Medication 81 MILLIGRAM(S): at 11:11

## 2021-09-05 RX ADMIN — Medication 50 MILLIGRAM(S): at 05:21

## 2021-09-05 RX ADMIN — SIMVASTATIN 40 MILLIGRAM(S): 20 TABLET, FILM COATED ORAL at 21:48

## 2021-09-05 RX ADMIN — Medication 40 MILLIGRAM(S): at 05:21

## 2021-09-05 RX ADMIN — Medication 3 UNIT(S): at 08:09

## 2021-09-05 RX ADMIN — Medication 3 UNIT(S): at 11:15

## 2021-09-05 RX ADMIN — Medication 6 UNIT(S): at 16:57

## 2021-09-05 RX ADMIN — ALBUTEROL 2 PUFF(S): 90 AEROSOL, METERED ORAL at 21:49

## 2021-09-05 RX ADMIN — BUDESONIDE AND FORMOTEROL FUMARATE DIHYDRATE 2 PUFF(S): 160; 4.5 AEROSOL RESPIRATORY (INHALATION) at 05:21

## 2021-09-05 RX ADMIN — Medication 10 MILLIGRAM(S): at 05:21

## 2021-09-05 RX ADMIN — BUDESONIDE AND FORMOTEROL FUMARATE DIHYDRATE 2 PUFF(S): 160; 4.5 AEROSOL RESPIRATORY (INHALATION) at 18:18

## 2021-09-05 RX ADMIN — ALBUTEROL 2 PUFF(S): 90 AEROSOL, METERED ORAL at 05:21

## 2021-09-05 RX ADMIN — INSULIN GLARGINE 40 UNIT(S): 100 INJECTION, SOLUTION SUBCUTANEOUS at 21:48

## 2021-09-05 RX ADMIN — Medication 10 MILLIGRAM(S): at 13:58

## 2021-09-05 RX ADMIN — RIVAROXABAN 15 MILLIGRAM(S): KIT at 16:58

## 2021-09-05 RX ADMIN — ALBUTEROL 2 PUFF(S): 90 AEROSOL, METERED ORAL at 13:58

## 2021-09-05 RX ADMIN — Medication 6: at 11:15

## 2021-09-05 NOTE — PROGRESS NOTE ADULT - SUBJECTIVE AND OBJECTIVE BOX
CARDIOLOGY CONSULT NOTE    Patient is a 75y Male with a known history of :    HPI:  76yo man with known hx HTN, HLD, DM, COPD (on home o2 2L), chronic systolic CHF with LVEF 40%, CAD s/p 4 vessel CABG with AICD for ischemic cardiomyopathy.    Admitted with worsening respiratory distress and LE edema over the last few days.   EMS found him in resp failure and hypertensive 200/100s with rales in bilateral lung fields.  SPO2 70% RA  Pt placed on CPAP, given NTG spray x 5, lasix.   SPO2 90% on ED arrival.  Still appears SOB but much improved as per pt.  creat baseline 2s; 1.7 currently.  Med and diet compliant as per pt.  BNP 3900  trop 0.2      REVIEW OF SYSTEMS:  CONSTITUTIONAL: No fever, weight loss, or fatigue  EYES: No eye pain, visual disturbances, or discharge  ENMT:  No difficulty hearing, tinnitus, vertigo; No sinus or throat pain  NECK: No pain or stiffness  BREASTS: No pain, masses, or nipple discharge  RESPIRATORY: No cough, wheezing, chills or hemoptysis; + shortness of breath  CARDIOVASCULAR: No chest pain, palpitations, dizziness; + leg swelling  GASTROINTESTINAL: No abdominal or epigastric pain. No nausea, vomiting, or hematemesis; No diarrhea or constipation. No melena or hematochezia.  GENITOURINARY: No dysuria, frequency, hematuria, or incontinence  NEUROLOGICAL: No headaches, memory loss, loss of strength, numbness, or tremors  SKIN: No itching, burning, rashes, or lesions   LYMPH NODES: No enlarged glands  ENDOCRINE: No heat or cold intolerance; No hair loss  MUSCULOSKELETAL: No joint pain or swelling; No muscle, back, or extremity pain  PSYCHIATRIC: No depression, anxiety, mood swings, or difficulty sleeping  HEME/LYMPH: No easy bruising, or bleeding gums  ALLERGY AND IMMUNOLOGIC: No hives or eczema    MEDICATIONS  (STANDING):  ALBUTerol    90 MICROgram(s) HFA Inhaler 2 Puff(s) Inhalation every 8 hours  aspirin enteric coated 81 milliGRAM(s) Oral daily  budesonide 160 MICROgram(s)/formoterol 4.5 MICROgram(s) Inhaler 2 Puff(s) Inhalation two times a day  furosemide   Injectable 40 milliGRAM(s) IV Push two times a day  glucagon  Injectable 1 milliGRAM(s) IntraMuscular once  hydrALAZINE 10 milliGRAM(s) Oral three times a day  insulin glargine Injectable (LANTUS) 40 Unit(s) SubCutaneous at bedtime  insulin lispro (ADMELOG) corrective regimen sliding scale   SubCutaneous three times a day before meals  insulin lispro Injectable (ADMELOG) 6 Unit(s) SubCutaneous three times a day before meals  isosorbide   mononitrate ER Tablet (IMDUR) 60 milliGRAM(s) Oral daily  metoprolol succinate ER 50 milliGRAM(s) Oral daily  predniSONE   Tablet 20 milliGRAM(s) Oral daily  rivaroxaban 15 milliGRAM(s) Oral with dinner  simvastatin 40 milliGRAM(s) Oral at bedtime  tamsulosin 0.4 milliGRAM(s) Oral at bedtime    MEDICATIONS  (PRN):      ALLERGIES: No Known Allergies      FAMILY HISTORY:  No pertinent family history in first degree relatives        PHYSICAL EXAMINATION:  -----------------------------  Vital Signs Last 24 Hrs  T(C): 36.6 (05 Sep 2021 10:33), Max: 36.8 (04 Sep 2021 23:31)  T(F): 97.8 (05 Sep 2021 10:33), Max: 98.2 (04 Sep 2021 23:31)  HR: 71 (05 Sep 2021 13:56) (70 - 78)  BP: 115/67 (05 Sep 2021 13:56) (111/63 - 126/62)  RR: 18 (05 Sep 2021 10:33) (18 - 18)  SpO2: 97% (05 Sep 2021 10:33) (95% - 100%)      Constitutional: mild resp distress.   Eyes: the conjunctiva exhibited no abnormalities and the eyelids demonstrated no xanthelasmas.   HEENT: normal oral mucosa, no oral pallor and no oral cyanosis.   Neck: normal jugular venous A waves present, normal jugular venous V waves present and no jugular venous nava A waves.   Pulmonary: coarse BS woth mild rales at bases  Cardiovascular: heart rate and rhythm were normal, normal S1 and S2 and no murmur, gallop, rub, heave or thrill are present.   Abdomen: soft, non-tender, no hepato-splenomegaly and no abdominal mass palpated.   Musculoskeletal: the gait could not be assessed..   Extremities: no clubbing of the fingernails, no localized cyanosis, no petechial hemorrhages and no ischemic changes.   Skin: normal skin color and pigmentation, no rash, no venous stasis, no skin lesions, no skin ulcer and no xanthoma was observed.   Psychiatric: oriented to person, place, and time, the affect was normal, the mood was normal and not feeling anxious.       LABS:   --------                        11.3   11.24 )-----------( 104      ( 05 Sep 2021 07:13 )             35.6     09-05    138  |  103  |  37<H>  ----------------------------<  389<H>  5.0   |  33<H>  |  2.10<H>    Ca    8.5      05 Sep 2021 07:13    TPro  6.7  /  Alb  3.2<L>  /  TBili  0.7  /  DBili  x   /  AST  77<H>  /  ALT  49  /  AlkPhos  123<H>  09-04      CARDIAC MARKERS ( 04 Sep 2021 07:52 )  .262 ng/mL / x     / x     / x     / x              RADIOLOGY:  -----------------    ECG: V-paced    < from: TTE Echo Complete w/o Contrast w/ Doppler (07.13.21 @ 10:39) >  Summary:   1. Left ventricular ejection fraction,by visual estimation, is 40 to 45%.   2. Technically difficult study.   3. Mildly decreased global left ventricular systolic function.   4. Mid and apical anterior wall, mid and apical anterior septum, mid and apical inferior septum, and mid anterolateral segment are abnormal as described above.   5. Mildly increased left ventricular internal cavity size.   6. The left ventricular diastolic function could not be assessed in this study.   7. There is mild concentric left ventricular hypertrophy.  8. Mildly enlarged right ventricle.   9. Mildly reduced RV systolic function.  10. Normal left atrial size.  11. Normal right atrial size.  12. There is no evidence of pericardial effusion.  13. Mild mitral annular calcification.  14. Mild thickeningand calcification of the anterior and posterior mitral valve leaflets.  15. Trace mitral valve regurgitation.  16. Mild tricuspid regurgitation.  17. Moderate aortic regurgitation.  18. Moderate to severe aortic valve stenosis.  19. Mild pulmonic valve regurgitation.  20. C/w the study of 3-14-21, findings are similar.  21. There is mild aortic root calcification.    Jayce Varma MD FACC, IOANA, FACP  Electronically signed on 7/13/2021 at 5:03:55 PM    < end of copied text >

## 2021-09-05 NOTE — PROGRESS NOTE ADULT - ASSESSMENT
74yo man with known hx HTN, HLD, DM, COPD (on home o2 2L), chronic systolic CHF with LVEF 40%, CAD s/p 4 vessel CABG with AICD for ischemic cardiomyopathy.  Admitted with worsening respiratory distress and LE edema over the last few days.   EMS found him in resp failure and hypertensive 200/100s with rales in bilateral lung fields.  SPO2 70% RA  Pt placed on CPAP, given NTG spray x 5, lasix.   SPO2 90% on ED arrival.  Still appears SOB but much improved as per pt.  creat baseline 2s; 1.7 currently.  Med and diet compliant as per pt.  BNP 3900  trop 0.2    Imp:  Acute on Chronic Systolic/Diastolic Heart Failure  Hypoxemic Hypercarbic Resp Failure  Hypertensive Urgency  Aortic Stenosis  Mild pulmonary hypertension  EF 40-45% mild TR, MS, mod AR, mod -sev AS    -monitor on tele  -cont diuresis with IV lasix... creat 1.7 -> 2.1... will decrease to daily  -monitor lytes and creat; baseline DENISHA   -will reviewed echo.... concern for severe AS; will tx to [facility] when pulm status stable for MARCIA  -cont home asa/metoprolol/imdur/statin/xarelto  -cont hydralazine for BP control  -cont home nebs, steroids. and O2 for COPD

## 2021-09-05 NOTE — PROGRESS NOTE ADULT - SUBJECTIVE AND OBJECTIVE BOX
INTERVAL HPI:  74yo, BM with HTN, HLD, DM, CAD with CABG+ stents, Diastolic CHF, S/P AICD, Chronic A Fib( per Cholo Enriquez note on July 11th 2021), COPD (on home o2 2L), Obesity, EMBER on CPAP( reports compliance),  Gout.  Smoked close to 40 years, quit 20 years ago.  Brought in for worsening SOB over several days, when asked for trigger , says was drinking too much water+ soda as weather was hot and humid.  EMS reported SPO2 of 76% on room air and BP of 190/100.  Placed on BIPAP by EMS and givem NTG spray x5.  SPO2 90% upon arrival to ED.  Denies fever, chills, new cough or sputum production.  Admitted with acute on chronic hypoxic hypercarbic Respiratory failure due to decompensated CHF.    OVERNIGHT EVENTS:  Awake comfortable and feels better.    Vital Signs Last 24 Hrs  T(C): 36.6 (05 Sep 2021 10:33), Max: 36.8 (04 Sep 2021 23:31)  T(F): 97.8 (05 Sep 2021 10:33), Max: 98.2 (04 Sep 2021 23:31)  HR: 71 (05 Sep 2021 13:56) (70 - 78)  BP: 115/67 (05 Sep 2021 13:56) (111/63 - 126/62)  BP(mean): --  RR: 18 (05 Sep 2021 10:33) (18 - 18)  SpO2: 97% (05 Sep 2021 10:33) (95% - 100%)    PHYSICAL EXAM:  GEN:         Awake, responsive and comfortable.  HEENT:    Normal.    RESP:        no wheezing  CVS:         Regular rate and rhythm.   ABD:         Soft, non-tender, non-distended;     MEDICATIONS  (STANDING):  ALBUTerol    90 MICROgram(s) HFA Inhaler 2 Puff(s) Inhalation every 8 hours  aspirin enteric coated 81 milliGRAM(s) Oral daily  budesonide 160 MICROgram(s)/formoterol 4.5 MICROgram(s) Inhaler 2 Puff(s) Inhalation two times a day  dextrose 40% Gel 15 Gram(s) Oral once  dextrose 5%. 1000 milliLiter(s) (50 mL/Hr) IV Continuous <Continuous>  dextrose 5%. 1000 milliLiter(s) (100 mL/Hr) IV Continuous <Continuous>  dextrose 50% Injectable 25 Gram(s) IV Push once  dextrose 50% Injectable 12.5 Gram(s) IV Push once  dextrose 50% Injectable 25 Gram(s) IV Push once  glucagon  Injectable 1 milliGRAM(s) IntraMuscular once  hydrALAZINE 10 milliGRAM(s) Oral three times a day  insulin glargine Injectable (LANTUS) 40 Unit(s) SubCutaneous at bedtime  insulin lispro (ADMELOG) corrective regimen sliding scale   SubCutaneous three times a day before meals  insulin lispro Injectable (ADMELOG) 6 Unit(s) SubCutaneous three times a day before meals  isosorbide   mononitrate ER Tablet (IMDUR) 60 milliGRAM(s) Oral daily  metoprolol succinate ER 50 milliGRAM(s) Oral daily  predniSONE   Tablet 20 milliGRAM(s) Oral daily  rivaroxaban 15 milliGRAM(s) Oral with dinner  simvastatin 40 milliGRAM(s) Oral at bedtime  tamsulosin 0.4 milliGRAM(s) Oral at bedtime    LABS:                        11.3   11.24 )-----------( 104      ( 05 Sep 2021 07:13 )             35.6     09-05    138  |  103  |  37<H>  ----------------------------<  389<H>  5.0   |  33<H>  |  2.10<H>    Ca    8.5      05 Sep 2021 07:13    TPro  6.7  /  Alb  3.2<L>  /  TBili  0.7  /  DBili  x   /  AST  77<H>  /  ALT  49  /  AlkPhos  123<H>  09-04    PT/INR - ( 04 Sep 2021 07:55 )   PT: 19.3 sec;   INR: 1.71 ratio      PTT - ( 04 Sep 2021 07:55 )  PTT:35.1 sec  09-04 @ 06:58  pH: 7.37  pCO2: 47  pO2: 313  SaO2: 100    ASSESSMENT AND PLAN:  ·	Acute on chronic hypoxic Respiratory failure.  ·	Acute on chronic systolic + diastolic CHF.  ·	Aortic Regurgitation+ Stenosis.  ·	Acute COPD exacerbation.  ·	Leukocytosis.  ·	Renal Insuffiencey.  ·	CAD with CABG+ stents.  ·	S/P AICD.  ·	Chronic A Fib  ·	Obesity.  ·	EMBER.  ·	HTN.  ·	HLD.  ·	DM.  ·	Gout.    Supplemental O2 with nocturnal + PRN BIPAP.  IV diuretics to keep in negative balance.  Fluid  restriction.  Continue Symbicort and nebulizer.  On Xarelto for chronic A Fib.  Discussed with DR. Cox( Cardiology), has moderate to severe As and may be sent to [facility] for intervention.

## 2021-09-05 NOTE — PROGRESS NOTE ADULT - SUBJECTIVE AND OBJECTIVE BOX
[facility] NEPHROLOGY SERVICES, [facility]  NEPHROLOGY AND HYPERTENSION  [address]  [address]  [place] [address]  [phone]    MD DANIEL GARRETT MD ANDREY GONCHARUK, MD MADHU KORRAPATI, MD YELENA ROSENBERG, MD BINNY KOSHY, MD CHRISTOPHER CAPUTO, MD EDWARD BOVER, MD          Patient events noted  Feels better     MEDICATIONS  (STANDING):  ALBUTerol    90 MICROgram(s) HFA Inhaler 2 Puff(s) Inhalation every 8 hours  aspirin enteric coated 81 milliGRAM(s) Oral daily  budesonide 160 MICROgram(s)/formoterol 4.5 MICROgram(s) Inhaler 2 Puff(s) Inhalation two times a day  dextrose 40% Gel 15 Gram(s) Oral once  dextrose 5%. 1000 milliLiter(s) (50 mL/Hr) IV Continuous <Continuous>  dextrose 5%. 1000 milliLiter(s) (100 mL/Hr) IV Continuous <Continuous>  dextrose 50% Injectable 25 Gram(s) IV Push once  dextrose 50% Injectable 12.5 Gram(s) IV Push once  dextrose 50% Injectable 25 Gram(s) IV Push once  glucagon  Injectable 1 milliGRAM(s) IntraMuscular once  hydrALAZINE 10 milliGRAM(s) Oral three times a day  insulin glargine Injectable (LANTUS) 40 Unit(s) SubCutaneous at bedtime  insulin lispro (ADMELOG) corrective regimen sliding scale   SubCutaneous three times a day before meals  insulin lispro Injectable (ADMELOG) 6 Unit(s) SubCutaneous three times a day before meals  isosorbide   mononitrate ER Tablet (IMDUR) 60 milliGRAM(s) Oral daily  metoprolol succinate ER 50 milliGRAM(s) Oral daily  predniSONE   Tablet 20 milliGRAM(s) Oral daily  rivaroxaban 15 milliGRAM(s) Oral with dinner  simvastatin 40 milliGRAM(s) Oral at bedtime  tamsulosin 0.4 milliGRAM(s) Oral at bedtime    MEDICATIONS  (PRN):      09-04-21 @ 07:01  -  09-05-21 @ 07:00  --------------------------------------------------------  IN: 400 mL / OUT: 200 mL / NET: 200 mL    09-05-21 @ 07:01  -  09-05-21 @ 20:24  --------------------------------------------------------  IN: 1005 mL / OUT: 800 mL / NET: 205 mL      PHYSICAL EXAM:      T(C): 36.7 (09-05-21 @ 16:12), Max: 36.8 (09-04-21 @ 23:31)  HR: 71 (09-05-21 @ 16:12) (70 - 78)  BP: 111/65 (09-05-21 @ 16:12) (111/63 - 126/62)  RR: 18 (09-05-21 @ 16:12) (18 - 18)  SpO2: 97% (09-05-21 @ 16:12) (95% - 100%)  Wt(kg): --  Lungs clear  Heart S1S2  Abd soft NT ND  Extremities:   tr edema                                    11.3   11.24 )-----------( 104      ( 05 Sep 2021 07:13 )             35.6     09-05    138  |  103  |  37<H>  ----------------------------<  389<H>  5.0   |  33<H>  |  2.10<H>    Ca    8.5      05 Sep 2021 07:13    TPro  6.7  /  Alb  3.2<L>  /  TBili  0.7  /  DBili  x   /  AST  77<H>  /  ALT  49  /  AlkPhos  123<H>  09-04    ABG - ( 04 Sep 2021 06:58 )  pH, Arterial: x     pH, Blood: 7.37  /  pCO2: 47    /  pO2: 313   / HCO3: 26    / Base Excess: 0.8   /  SaO2: 100               LIVER FUNCTIONS - ( 04 Sep 2021 07:52 )  Alb: 3.2 g/dL / Pro: 6.7 gm/dL / ALK PHOS: 123 U/L / ALT: 49 U/L / AST: 77 U/L / GGT: x           Creatinine Trend: 2.10<--, 1.76<--    Trend Bun/Cr  09-05-21 @ 07:13  BUN/CR -  37<H> / 2.10<H>  09-04-21 @ 07:52  BUN/CR -  27<H> / 1.76<H>  07-14-21 @ 08:55  BUN/CR -  73<H> / 2.19<H>  07-13-21 @ 07:55  BUN/CR -  75<H> / 2.30<H>  07-12-21 @ 07:33  BUN/CR -  58<H> / 2.10<H>  07-11-21 @ 00:50  BUN/CR -  38<H> / 2.00<H>  03-19-21 @ 09:41  BUN/CR -  62<H> / 2.01<H>  03-18-21 @ 09:15  BUN/CR -  70<H> / 2.12<H>  03-17-21 @ 06:34  BUN/CR -  74<H> / 2.26<H>  03-16-21 @ 08:02  BUN/CR -  69<H> / 2.28<H>  03-15-21 @ 08:09  BUN/CR -  63<H> / 2.43<H>  03-14-21 @ 04:17  BUN/CR -  44<H> / 2.34<H>        Assessment   DENISHA CKD 3-4 CHF CRS  Cr within historical range     Plan  Continued warranted diuresis   Will follow       Jon Rowe MD

## 2021-09-05 NOTE — PROGRESS NOTE ADULT - ASSESSMENT
75M  hx DM, HTN, HLD, Obesity, COPD  on home o2L          c/c  systolic , CHF, CAD  stent in 2011,  s/p quadruple bypass           AFIb on xarelto,  PPM/AICD,           chronic hypercarbic respiratory failure:     admitted  with  worsening sob      *   acute  hypoxic, hypercarbic resop failure, on arrival,  place on bipap/by er       cxr with increased chf       acute  on  chronic  systolic  chf       h/o cardiomyopathy,  has  AICD       +  troponin. from  ckd/  acute  chf, and  not  from mi         and has  elevated  bnp/  paced rhythm      tele  monitoring      on iv lasix  , bid      folow  weights      Echo,  from 7/2021,  ef  40 . mod  AS.. RV dysfunction      card/pulm on board       *   COPD , on symbicort . prednisone/ albuterol          on  home O2 and  nocturnal  bipap         pulm d r brianna  increase insulin 2/2 hyperglycemia      *  CAD/  HLD :           on   asa/  statin    *   DM with hyperglycemia          on lantus,  follow  fs /  titrate  accordingly    *    CKD stage 3:/  dr cr, known to  pt   *    EMBER:/ obesity hypoventilation /  BMI  35            on  CPAP.  nocturnal             baseline  pCo2  was about 50    *   c/c  leukocytosis in 16,000 range./  suspect  from steroid         dvt  ppx/  on xarelto       on asa/ toprol/ hydralazine/ imdur/ lasix/ zocor/ lantus      dv< from: TTE Echo Complete w/o Contrast w/ Doppler (07.13.21 @ 10:39) >  Summary:   1. Left ventricular ejection fraction,by visual estimation, is 40 to 45%.   2. Technically difficult study.   3. Mildly decreased global left ventricular systolic function.   4. Mid and apical anterior wall, mid and apical anterior septum, mid and apical inferior septum, and mid anterolateral segment are abnormal as described above.   5. Mildly increased left ventricular internal cavity size.   6. The left ventricular diastolic function could not be assessed in this study.   7. There is mild concentric left ventricular hypertrophy.  8. Mildly enlarged right ventricle.   9. Mildly reduced RV systolic function.  10. Normal left atrial size.  11. Normal right atrial size.  12. There is no evidence of pericardial effusion.  13. Mild mitral annular calcification.  14. Mild thickeningand calcification of the anterior and posterior mitral valve leaflets.  15. Trace mitral valve regurgitation.  16. Mild tricuspid regurgitation.  17. Moderate aortic regurgitation.  18. Moderate to severe aortic valve stenosis.  19. Mild pulmonic valve regurgitation.  20. C/w the study of 3-14-21, findings are similar.  21. There is mild aortic root calcification.  < end of copied text >         ra

## 2021-09-05 NOTE — PROGRESS NOTE ADULT - SUBJECTIVE AND OBJECTIVE BOX
Patient is a 75y old  Male who presents with a chief complaint of sob (04 Sep 2021 22:00)    INTERVAL HPI/OVERNIGHT EVENTS: no events     MEDICATIONS  (STANDING):  ALBUTerol    90 MICROgram(s) HFA Inhaler 2 Puff(s) Inhalation every 8 hours  aspirin enteric coated 81 milliGRAM(s) Oral daily  budesonide 160 MICROgram(s)/formoterol 4.5 MICROgram(s) Inhaler 2 Puff(s) Inhalation two times a day  dextrose 40% Gel 15 Gram(s) Oral once  dextrose 5%. 1000 milliLiter(s) (50 mL/Hr) IV Continuous <Continuous>  dextrose 5%. 1000 milliLiter(s) (100 mL/Hr) IV Continuous <Continuous>  dextrose 50% Injectable 25 Gram(s) IV Push once  dextrose 50% Injectable 12.5 Gram(s) IV Push once  dextrose 50% Injectable 25 Gram(s) IV Push once  furosemide   Injectable 40 milliGRAM(s) IV Push two times a day  glucagon  Injectable 1 milliGRAM(s) IntraMuscular once  hydrALAZINE 10 milliGRAM(s) Oral three times a day  insulin glargine Injectable (LANTUS) 40 Unit(s) SubCutaneous at bedtime  insulin lispro (ADMELOG) corrective regimen sliding scale   SubCutaneous three times a day before meals  insulin lispro Injectable (ADMELOG) 6 Unit(s) SubCutaneous three times a day before meals  isosorbide   mononitrate ER Tablet (IMDUR) 60 milliGRAM(s) Oral daily  metoprolol succinate ER 50 milliGRAM(s) Oral daily  predniSONE   Tablet 20 milliGRAM(s) Oral daily  rivaroxaban 15 milliGRAM(s) Oral with dinner  simvastatin 40 milliGRAM(s) Oral at bedtime  tamsulosin 0.4 milliGRAM(s) Oral at bedtime    MEDICATIONS  (PRN):    Allergies    No Known Allergies    Intolerances      REVIEW OF SYSTEMS:  All other systems reviewed and are negative    Vital Signs Last 24 Hrs  T(C): 36.6 (05 Sep 2021 10:33), Max: 36.8 (04 Sep 2021 23:31)  T(F): 97.8 (05 Sep 2021 10:33), Max: 98.2 (04 Sep 2021 23:31)  HR: 70 (05 Sep 2021 10:33) (70 - 78)  BP: 113/65 (05 Sep 2021 10:33) (111/63 - 126/62)  BP(mean): --  RR: 18 (05 Sep 2021 10:33) (18 - 18)  SpO2: 97% (05 Sep 2021 10:33) (95% - 100%)  Daily     Daily Weight in k.6 (05 Sep 2021 04:54)  I&O's Summary    04 Sep 2021 07:01  -  05 Sep 2021 07:00  --------------------------------------------------------  IN: 350 mL / OUT: 200 mL / NET: 150 mL    05 Sep 2021 07:01  -  05 Sep 2021 12:10  --------------------------------------------------------  IN: 413 mL / OUT: 0 mL / NET: 413 mL      CAPILLARY BLOOD GLUCOSE      POCT Blood Glucose.: 480 mg/dL (05 Sep 2021 11:09)  POCT Blood Glucose.: 391 mg/dL (05 Sep 2021 08:05)  POCT Blood Glucose.: 312 mg/dL (04 Sep 2021 22:18)  POCT Blood Glucose.: 360 mg/dL (04 Sep 2021 16:05)  POCT Blood Glucose.: 276 mg/dL (04 Sep 2021 12:18)    PHYSICAL EXAM:  GENERAL: NAD,    HEAD:  Atraumatic, Normocephalic  EYES: EOMI, PERRLA, conjunctiva and sclera clear  ENMT: No tonsillar erythema, exudates, or enlargement; Moist mucous membranes, Good dentition, No lesions  NECK: Supple, No JVD, Normal thyroid  NERVOUS SYSTEM:  Alert & Oriented X3, Good concentration; Motor Strength 5/5 B/L upper and lower extremities; DTRs 2+ intact and symmetric  CHEST/LUNG: Clear to percussion bilaterally; No rales, rhonchi, wheezing, or rubs  HEART: Regular rate and rhythm; No murmurs, rubs, or gallops  ABDOMEN: Soft, Nontender, Nondistended; Bowel sounds present  EXTREMITIES:  2+ Peripheral Pulses, No clubbing, cyanosis, or edema  LYMPH: No lymphadenopathy noted  SKIN: No rashes or lesions    Labs                          11.3   11.24 )-----------( 104      ( 05 Sep 2021 07:13 )             35.6         138  |  103  |  37<H>  ----------------------------<  389<H>  5.0   |  33<H>  |  2.10<H>    Ca    8.5      05 Sep 2021 07:13    TPro  6.7  /  Alb  3.2<L>  /  TBili  0.7  /  DBili  x   /  AST  77<H>  /  ALT  49  /  AlkPhos  123<H>  04    PT/INR - ( 04 Sep 2021 07:55 )   PT: 19.3 sec;   INR: 1.71 ratio         PTT - ( 04 Sep 2021 07:55 )  PTT:35.1 sec  CARDIAC MARKERS ( 04 Sep 2021 07:52 )  .262 ng/mL / x     / x     / x     / x                        DVT prophylaxis: > Lovenox 40mg SQ daily  > Heparin   > SCD's

## 2021-09-06 LAB
COVID-19 SPIKE DOMAIN AB INTERP: POSITIVE
COVID-19 SPIKE DOMAIN ANTIBODY RESULT: >250 U/ML — HIGH
GLUCOSE BLDC GLUCOMTR-MCNC: 339 MG/DL — HIGH (ref 70–99)
GLUCOSE BLDC GLUCOMTR-MCNC: 340 MG/DL — HIGH (ref 70–99)
GLUCOSE BLDC GLUCOMTR-MCNC: 348 MG/DL — HIGH (ref 70–99)
GLUCOSE BLDC GLUCOMTR-MCNC: 364 MG/DL — HIGH (ref 70–99)
SARS-COV-2 IGG+IGM SERPL QL IA: >250 U/ML — HIGH
SARS-COV-2 IGG+IGM SERPL QL IA: POSITIVE

## 2021-09-06 PROCEDURE — 99233 SBSQ HOSP IP/OBS HIGH 50: CPT

## 2021-09-06 RX ORDER — DEXTROSE 50 % IN WATER 50 %
25 SYRINGE (ML) INTRAVENOUS ONCE
Refills: 0 | Status: DISCONTINUED | OUTPATIENT
Start: 2021-09-06 | End: 2021-09-06

## 2021-09-06 RX ADMIN — ALBUTEROL 2 PUFF(S): 90 AEROSOL, METERED ORAL at 06:31

## 2021-09-06 RX ADMIN — ISOSORBIDE MONONITRATE 60 MILLIGRAM(S): 60 TABLET, EXTENDED RELEASE ORAL at 12:14

## 2021-09-06 RX ADMIN — Medication 4: at 12:11

## 2021-09-06 RX ADMIN — INSULIN GLARGINE 40 UNIT(S): 100 INJECTION, SOLUTION SUBCUTANEOUS at 22:37

## 2021-09-06 RX ADMIN — Medication 10 MILLIGRAM(S): at 14:10

## 2021-09-06 RX ADMIN — Medication 10 MILLIGRAM(S): at 22:35

## 2021-09-06 RX ADMIN — Medication 20 MILLIGRAM(S): at 06:30

## 2021-09-06 RX ADMIN — Medication 6 UNIT(S): at 08:51

## 2021-09-06 RX ADMIN — SIMVASTATIN 40 MILLIGRAM(S): 20 TABLET, FILM COATED ORAL at 22:35

## 2021-09-06 RX ADMIN — Medication 6 UNIT(S): at 18:41

## 2021-09-06 RX ADMIN — Medication 81 MILLIGRAM(S): at 12:15

## 2021-09-06 RX ADMIN — Medication 6 UNIT(S): at 12:11

## 2021-09-06 RX ADMIN — BUDESONIDE AND FORMOTEROL FUMARATE DIHYDRATE 2 PUFF(S): 160; 4.5 AEROSOL RESPIRATORY (INHALATION) at 06:31

## 2021-09-06 RX ADMIN — Medication 50 MILLIGRAM(S): at 06:30

## 2021-09-06 RX ADMIN — BUDESONIDE AND FORMOTEROL FUMARATE DIHYDRATE 2 PUFF(S): 160; 4.5 AEROSOL RESPIRATORY (INHALATION) at 18:42

## 2021-09-06 RX ADMIN — Medication 1: at 08:50

## 2021-09-06 RX ADMIN — TAMSULOSIN HYDROCHLORIDE 0.4 MILLIGRAM(S): 0.4 CAPSULE ORAL at 22:35

## 2021-09-06 RX ADMIN — ALBUTEROL 2 PUFF(S): 90 AEROSOL, METERED ORAL at 14:10

## 2021-09-06 RX ADMIN — Medication 10 MILLIGRAM(S): at 06:30

## 2021-09-06 RX ADMIN — Medication 40 MILLIGRAM(S): at 06:30

## 2021-09-06 RX ADMIN — Medication 4: at 18:41

## 2021-09-06 RX ADMIN — RIVAROXABAN 15 MILLIGRAM(S): KIT at 18:44

## 2021-09-06 NOTE — PROGRESS NOTE ADULT - SUBJECTIVE AND OBJECTIVE BOX
INTERVAL HPI:  74yo, BM with HTN, HLD, DM, CAD with CABG+ stents, Diastolic CHF, S/P AICD, Chronic A Fib( per Cholo Enriquez note on July 11th 2021), COPD (on home o2 2L), Obesity, EMBER on CPAP( reports compliance),  Gout.  Smoked close to 40 years, quit 20 years ago.  Brought in for worsening SOB over several days, when asked for trigger , says was drinking too much water+ soda as weather was hot and humid.  EMS reported SPO2 of 76% on room air and BP of 190/100.  Placed on BIPAP by EMS and givem NTG spray x5.  SPO2 90% upon arrival to ED.  Denies fever, chills, new cough or sputum production.  Admitted with acute on chronic hypoxic hypercarbic Respiratory failure due to decompensated CHF.    OVERNIGHT EVENTS:  Comfortable in bed.    Vital Signs Last 24 Hrs  T(C): 36.5 (06 Sep 2021 16:31), Max: 36.7 (05 Sep 2021 23:31)  T(F): 97.7 (06 Sep 2021 16:31), Max: 98.1 (05 Sep 2021 23:31)  HR: 69 (06 Sep 2021 16:31) (69 - 72)  BP: 147/75 (06 Sep 2021 16:31) (110/63 - 147/75)  BP(mean): --  RR: 18 (06 Sep 2021 16:31) (18 - 18)  SpO2: 95% (06 Sep 2021 16:31) (94% - 99%)    PHYSICAL EXAM:  GEN:         Awake, responsive and comfortable.  HEENT:    Normal.    RESP:       no wheezing.  CVS:          Regular rate and rhythm.   ABD:         Soft, non-tender, non-distended;     MEDICATIONS  (STANDING):  ALBUTerol    90 MICROgram(s) HFA Inhaler 2 Puff(s) Inhalation every 8 hours  aspirin enteric coated 81 milliGRAM(s) Oral daily  budesonide 160 MICROgram(s)/formoterol 4.5 MICROgram(s) Inhaler 2 Puff(s) Inhalation two times a day  dextrose 40% Gel 15 Gram(s) Oral once  dextrose 5%. 1000 milliLiter(s) (50 mL/Hr) IV Continuous <Continuous>  dextrose 5%. 1000 milliLiter(s) (100 mL/Hr) IV Continuous <Continuous>  dextrose 50% Injectable 25 Gram(s) IV Push once  dextrose 50% Injectable 12.5 Gram(s) IV Push once  dextrose 50% Injectable 25 Gram(s) IV Push once  furosemide   Injectable 40 milliGRAM(s) IV Push daily  glucagon  Injectable 1 milliGRAM(s) IntraMuscular once  hydrALAZINE 10 milliGRAM(s) Oral three times a day  insulin glargine Injectable (LANTUS) 40 Unit(s) SubCutaneous at bedtime  insulin lispro (ADMELOG) corrective regimen sliding scale   SubCutaneous three times a day before meals  insulin lispro Injectable (ADMELOG) 6 Unit(s) SubCutaneous three times a day before meals  isosorbide   mononitrate ER Tablet (IMDUR) 60 milliGRAM(s) Oral daily  metoprolol succinate ER 50 milliGRAM(s) Oral daily  predniSONE   Tablet 20 milliGRAM(s) Oral daily  rivaroxaban 15 milliGRAM(s) Oral with dinner  simvastatin 40 milliGRAM(s) Oral at bedtime  tamsulosin 0.4 milliGRAM(s) Oral at bedtime    LABS:                        11.3   11.24 )-----------( 104      ( 05 Sep 2021 07:13 )             35.6     09-05    138  |  103  |  37<H>  ----------------------------<  389<H>  5.0   |  33<H>  |  2.10<H>    Ca    8.5      05 Sep 2021 07:13    09-04 @ 06:58  pH: 7.37  pCO2: 47  pO2: 313  SaO2: 100    ASSESSMENT AND PLAN:  ·	Acute on chronic hypoxic Respiratory failure.  ·	Acute on chronic systolic + diastolic CHF.  ·	Aortic Regurgitation+ Stenosis.  ·	Acute COPD exacerbation.  ·	Leukocytosis.  ·	Renal Insuffiencey.  ·	CAD with CABG+ stents.  ·	S/P AICD.  ·	Chronic A Fib  ·	Obesity.  ·	EMBER.  ·	HTN.  ·	HLD.  ·	DM.  ·	Gout.    Clinically better, continue O2 with nocturnal BIPAP.  IV diuretics, fluid retention.  On Symbicort, Xarelto and as needed bronchodilators.

## 2021-09-06 NOTE — PROGRESS NOTE ADULT - ASSESSMENT
75M  hx DM, HTN, HLD, Obesity, COPD  on home o2L, CHF, CAD s/p stent, s/p quadruple bypass, Afib on xarelto presented to the ER for SOB.     1. Acute hypoxic, Hypercarbic resp Failure   sec to CHF  c/w IV lasix Q24H.  cardiology on board.  f/u ECHO  c/w tele monitoring  monitor I & O's  monitor daily weights.    2. COPD  on Home O2  c/w inhalers  c/w prednisone.    3. Afib  c/w xarelto  c/w metoprolol for rate control.    4. CAD  c/w Aspirin, metoprolol, simvastatin, imdur    5. HTN  c/w metoprolol, Hydralazine    6. BPH  c/w flomax    7. HLD  c/w simvastatin    8. DVT ppx  on Xarelto. 75M  hx DM, HTN, HLD, Obesity, COPD  on home o2L, CHF, CAD s/p stent, s/p quadruple bypass, Afib on xarelto presented to the ER for SOB.     1. Acute hypoxic, Hypercarbic resp Failure   sec to CHF  c/w IV lasix Q24H.  cardiology on board.  f/u ECHO  c/w tele monitoring  monitor I & O's  monitor daily weights.    2. COPD  on Home O2  c/w inhalers  c/w prednisone.    3. Afib  c/w xarelto  c/w metoprolol for rate control.    4. CAD  c/w Aspirin, metoprolol, simvastatin, imdur    5. HTN  c/w metoprolol, Hydralazine    6. BPH  c/w flomax    7. HLD  c/w simvastatin    8. CKD  cr stable  monitor    9. DVT ppx  on Xarelto.

## 2021-09-06 NOTE — PROGRESS NOTE ADULT - SUBJECTIVE AND OBJECTIVE BOX
INTERVAL HPI/OVERNIGHT EVENTS: Pt seen and examined at bedside. Denies any complaints. No events overnight.    75y  Vital Signs Last 24 Hrs  T(C): 36.7 (06 Sep 2021 10:22), Max: 36.7 (05 Sep 2021 16:12)  T(F): 98.1 (06 Sep 2021 10:22), Max: 98.1 (05 Sep 2021 16:12)  HR: 70 (06 Sep 2021 10:22) (69 - 72)  BP: 131/67 (06 Sep 2021 10:22) (110/63 - 131/67)  BP(mean): --  RR: 18 (06 Sep 2021 10:22) (18 - 18)  SpO2: 95% (06 Sep 2021 10:22) (94% - 99%)  I&O's Summary    05 Sep 2021 07:01  -  06 Sep 2021 07:00  --------------------------------------------------------  IN: 1105 mL / OUT: 1050 mL / NET: 55 mL      MEDICATIONS  (STANDING):  ALBUTerol    90 MICROgram(s) HFA Inhaler 2 Puff(s) Inhalation every 8 hours  aspirin enteric coated 81 milliGRAM(s) Oral daily  budesonide 160 MICROgram(s)/formoterol 4.5 MICROgram(s) Inhaler 2 Puff(s) Inhalation two times a day  dextrose 40% Gel 15 Gram(s) Oral once  dextrose 5%. 1000 milliLiter(s) (50 mL/Hr) IV Continuous <Continuous>  dextrose 5%. 1000 milliLiter(s) (100 mL/Hr) IV Continuous <Continuous>  dextrose 50% Injectable 25 Gram(s) IV Push once  dextrose 50% Injectable 12.5 Gram(s) IV Push once  dextrose 50% Injectable 25 Gram(s) IV Push once  furosemide   Injectable 40 milliGRAM(s) IV Push daily  glucagon  Injectable 1 milliGRAM(s) IntraMuscular once  hydrALAZINE 10 milliGRAM(s) Oral three times a day  insulin glargine Injectable (LANTUS) 40 Unit(s) SubCutaneous at bedtime  insulin lispro (ADMELOG) corrective regimen sliding scale   SubCutaneous three times a day before meals  insulin lispro Injectable (ADMELOG) 6 Unit(s) SubCutaneous three times a day before meals  isosorbide   mononitrate ER Tablet (IMDUR) 60 milliGRAM(s) Oral daily  metoprolol succinate ER 50 milliGRAM(s) Oral daily  predniSONE   Tablet 20 milliGRAM(s) Oral daily  rivaroxaban 15 milliGRAM(s) Oral with dinner  simvastatin 40 milliGRAM(s) Oral at bedtime  tamsulosin 0.4 milliGRAM(s) Oral at bedtime    MEDICATIONS  (PRN):    LABS:    trop                        11.3   11.24 )-----------( 104      ( 05 Sep 2021 07:13 )             35.6     09-05    138  |  103  |  37<H>  ----------------------------<  389<H>  5.0   |  33<H>  |  2.10<H>    Ca    8.5      05 Sep 2021 07:13          CAPILLARY BLOOD GLUCOSE      POCT Blood Glucose.: 339 mg/dL (06 Sep 2021 12:02)  POCT Blood Glucose.: 185 mg/dL (06 Sep 2021 08:12)  POCT Blood Glucose.: 333 mg/dL (05 Sep 2021 21:43)  POCT Blood Glucose.: 409 mg/dL (05 Sep 2021 16:53)          REVIEW OF SYSTEMS:  CONSTITUTIONAL: No fever, weight loss, or fatigue  RESPIRATORY: No cough, wheezing, chills or hemoptysis; No shortness of breath  CARDIOVASCULAR: No chest pain, palpitations, dizziness, or leg swelling  GASTROINTESTINAL: No abdominal or epigastric pain. No nausea, vomiting, or hematemesis; No diarrhea or constipation. No melena or hematochezia.  GENITOURINARY: No dysuria, frequency, hematuria, or incontinence  NEUROLOGICAL: No headaches, memory loss, loss of strength, numbness, or tremors  MUSCULOSKELETAL: No joint pain or swelling; No muscle, back, or extremity pain      RADIOLOGY & ADDITIONAL TESTS:    Imaging Personally Reviewed:  [ ] YES  [ ] NO    Consultant(s) Notes Reviewed:  [x ] YES  [ ] NO    PHYSICAL EXAM:  GENERAL: NAD  NERVOUS SYSTEM:  Alert & Oriented X3, moves all extremities.  CHEST/LUNG: bibasilar crackles.  HEART: Regular rate and rhythm; No murmurs, rubs, or gallops  ABDOMEN: Soft, Nontender, Nondistended; Bowel sounds present  EXTREMITIES:  2+ Peripheral Pulses, + edema.      A & P:        Care Discussed with Consultants/Other Providers [ x] YES  [ ] NO

## 2021-09-06 NOTE — PROGRESS NOTE ADULT - SUBJECTIVE AND OBJECTIVE BOX
[facility] NEPHROLOGY SERVICES, [facility]  NEPHROLOGY AND HYPERTENSION  [address]  [address]  [place] [address]  [phone]    MD DANIEL GARRETT MD ANDREY GONCHARUK, MD MADHU KORRAPATI, MD YELENA ROSENBERG, MD BINNY KOSHY, MD CHRISTOPHER CAPUTO, MD EDWARD BOVER, MD          Patient events noted  No distress    MEDICATIONS  (STANDING):  ALBUTerol    90 MICROgram(s) HFA Inhaler 2 Puff(s) Inhalation every 8 hours  aspirin enteric coated 81 milliGRAM(s) Oral daily  budesonide 160 MICROgram(s)/formoterol 4.5 MICROgram(s) Inhaler 2 Puff(s) Inhalation two times a day  dextrose 40% Gel 15 Gram(s) Oral once  dextrose 5%. 1000 milliLiter(s) (50 mL/Hr) IV Continuous <Continuous>  dextrose 5%. 1000 milliLiter(s) (100 mL/Hr) IV Continuous <Continuous>  dextrose 50% Injectable 25 Gram(s) IV Push once  dextrose 50% Injectable 12.5 Gram(s) IV Push once  dextrose 50% Injectable 25 Gram(s) IV Push once  furosemide   Injectable 40 milliGRAM(s) IV Push daily  glucagon  Injectable 1 milliGRAM(s) IntraMuscular once  hydrALAZINE 10 milliGRAM(s) Oral three times a day  insulin glargine Injectable (LANTUS) 40 Unit(s) SubCutaneous at bedtime  insulin lispro (ADMELOG) corrective regimen sliding scale   SubCutaneous three times a day before meals  insulin lispro Injectable (ADMELOG) 6 Unit(s) SubCutaneous three times a day before meals  isosorbide   mononitrate ER Tablet (IMDUR) 60 milliGRAM(s) Oral daily  metoprolol succinate ER 50 milliGRAM(s) Oral daily  predniSONE   Tablet 20 milliGRAM(s) Oral daily  rivaroxaban 15 milliGRAM(s) Oral with dinner  simvastatin 40 milliGRAM(s) Oral at bedtime  tamsulosin 0.4 milliGRAM(s) Oral at bedtime    MEDICATIONS  (PRN):      09-05-21 @ 07:01  -  09-06-21 @ 07:00  --------------------------------------------------------  IN: 1105 mL / OUT: 1050 mL / NET: 55 mL      PHYSICAL EXAM:      T(C): 36.5 (09-06-21 @ 16:31), Max: 36.7 (09-05-21 @ 23:31)  HR: 69 (09-06-21 @ 16:31) (69 - 72)  BP: 147/75 (09-06-21 @ 16:31) (110/63 - 147/75)  RR: 18 (09-06-21 @ 16:31) (18 - 18)  SpO2: 95% (09-06-21 @ 16:31) (94% - 99%)  Wt(kg): --  Lungs clear  Heart S1S2  Abd soft NT ND  Extremities:   tr edema                                    11.3   11.24 )-----------( 104      ( 05 Sep 2021 07:13 )             35.6     09-05    138  |  103  |  37<H>  ----------------------------<  389<H>  5.0   |  33<H>  |  2.10<H>    Ca    8.5      05 Sep 2021 07:13          Creatinine Trend: 2.10<--, 1.76<--      Assessment   DENISHA CKD 3-4; renal indices in historical range    Plan:  Continue supportive care    Jon Rowe MD

## 2021-09-07 LAB
ANION GAP SERPL CALC-SCNC: 2 MMOL/L — LOW (ref 5–17)
BUN SERPL-MCNC: 47 MG/DL — HIGH (ref 7–23)
CALCIUM SERPL-MCNC: 8.7 MG/DL — SIGNIFICANT CHANGE UP (ref 8.5–10.1)
CHLORIDE SERPL-SCNC: 108 MMOL/L — SIGNIFICANT CHANGE UP (ref 96–108)
CO2 SERPL-SCNC: 34 MMOL/L — HIGH (ref 22–31)
CREAT SERPL-MCNC: 1.87 MG/DL — HIGH (ref 0.5–1.3)
GLUCOSE BLDC GLUCOMTR-MCNC: 153 MG/DL — HIGH (ref 70–99)
GLUCOSE BLDC GLUCOMTR-MCNC: 223 MG/DL — HIGH (ref 70–99)
GLUCOSE BLDC GLUCOMTR-MCNC: 230 MG/DL — HIGH (ref 70–99)
GLUCOSE BLDC GLUCOMTR-MCNC: 238 MG/DL — HIGH (ref 70–99)
GLUCOSE SERPL-MCNC: 169 MG/DL — HIGH (ref 70–99)
MAGNESIUM SERPL-MCNC: 2.1 MG/DL — SIGNIFICANT CHANGE UP (ref 1.6–2.6)
POTASSIUM SERPL-MCNC: 4.4 MMOL/L — SIGNIFICANT CHANGE UP (ref 3.5–5.3)
POTASSIUM SERPL-SCNC: 4.4 MMOL/L — SIGNIFICANT CHANGE UP (ref 3.5–5.3)
SODIUM SERPL-SCNC: 144 MMOL/L — SIGNIFICANT CHANGE UP (ref 135–145)

## 2021-09-07 PROCEDURE — 99233 SBSQ HOSP IP/OBS HIGH 50: CPT

## 2021-09-07 RX ADMIN — Medication 40 MILLIGRAM(S): at 07:16

## 2021-09-07 RX ADMIN — Medication 2: at 12:30

## 2021-09-07 RX ADMIN — ALBUTEROL 2 PUFF(S): 90 AEROSOL, METERED ORAL at 14:28

## 2021-09-07 RX ADMIN — Medication 10 MILLIGRAM(S): at 22:15

## 2021-09-07 RX ADMIN — Medication 1: at 08:28

## 2021-09-07 RX ADMIN — Medication 10 MILLIGRAM(S): at 07:17

## 2021-09-07 RX ADMIN — INSULIN GLARGINE 40 UNIT(S): 100 INJECTION, SOLUTION SUBCUTANEOUS at 22:15

## 2021-09-07 RX ADMIN — ALBUTEROL 2 PUFF(S): 90 AEROSOL, METERED ORAL at 22:16

## 2021-09-07 RX ADMIN — TAMSULOSIN HYDROCHLORIDE 0.4 MILLIGRAM(S): 0.4 CAPSULE ORAL at 22:15

## 2021-09-07 RX ADMIN — Medication 81 MILLIGRAM(S): at 12:31

## 2021-09-07 RX ADMIN — Medication 20 MILLIGRAM(S): at 07:17

## 2021-09-07 RX ADMIN — Medication 2: at 18:01

## 2021-09-07 RX ADMIN — BUDESONIDE AND FORMOTEROL FUMARATE DIHYDRATE 2 PUFF(S): 160; 4.5 AEROSOL RESPIRATORY (INHALATION) at 17:54

## 2021-09-07 RX ADMIN — SIMVASTATIN 40 MILLIGRAM(S): 20 TABLET, FILM COATED ORAL at 22:15

## 2021-09-07 RX ADMIN — Medication 6 UNIT(S): at 18:02

## 2021-09-07 RX ADMIN — RIVAROXABAN 15 MILLIGRAM(S): KIT at 17:55

## 2021-09-07 RX ADMIN — Medication 6 UNIT(S): at 08:29

## 2021-09-07 RX ADMIN — Medication 10 MILLIGRAM(S): at 14:28

## 2021-09-07 RX ADMIN — ALBUTEROL 2 PUFF(S): 90 AEROSOL, METERED ORAL at 07:17

## 2021-09-07 RX ADMIN — BUDESONIDE AND FORMOTEROL FUMARATE DIHYDRATE 2 PUFF(S): 160; 4.5 AEROSOL RESPIRATORY (INHALATION) at 07:18

## 2021-09-07 RX ADMIN — ISOSORBIDE MONONITRATE 60 MILLIGRAM(S): 60 TABLET, EXTENDED RELEASE ORAL at 12:31

## 2021-09-07 RX ADMIN — Medication 50 MILLIGRAM(S): at 07:17

## 2021-09-07 RX ADMIN — Medication 6 UNIT(S): at 12:31

## 2021-09-07 NOTE — PROGRESS NOTE ADULT - ASSESSMENT
76yo male with known hx HTN, HLD, DM, COPD (on home o2 2L), chronic systolic CHF with LVEF 40%, CAD s/p 4 vessel CABG with AICD for ischemic cardiomyopathy.  Admitted with worsening respiratory distress and LE edema over the last few days.   EMS found him in resp failure and hypertensive 200/100s with rales in bilateral lung fields.  SPO2 70% RA  Pt placed on CPAP, given NTG spray x 5, lasix.   SPO2 90% on ED arrival.  creat baseline 2s  Med and diet compliant as per pt.  BNP 3900  trop 0.2    Imp:  Acute on Chronic Systolic/Diastolic Heart Failure  Hypoxemic Hypercarbic Resp Failure  Hypertensive Urgency  Aortic Stenosis  CAD hx  EF 40-45% mild TR, SD, mod AR, mod - sev AS    -monitor on tele  -cont diuresis with IV Lasix daily, creat 1.7 -> 2.1  -monitor lytes and creat; baseline DENISHA   -echo with concern for severe AS; will tx to [facility] when pulm status stable for MARCIA  -cont home asa/metoprolol/imdur/statin/xarelto  -cont hydralazine for BP control  -cont nebs, steroids, and O2 for COPD

## 2021-09-07 NOTE — PROGRESS NOTE ADULT - ASSESSMENT
75M  hx DM, HTN, HLD, Obesity, COPD  on home o2L, CHF, CAD s/p stent, s/p quadruple bypass, Afib on xarelto presented to the ER for SOB.     1. Acute hypoxic, Hypercarbic resp Failure   sec to CHF  c/w IV lasix Q24H.  cardiology on board.  ECHO- EF 40-45%, Mod to sev AS  possible transfer to [facility] for [facility] once stable  c/w tele monitoring  monitor I & O's  monitor daily weights.  c/w Aspirin, metoprolol, simvastatin, imdur.    2. COPD  on Home O2  c/w inhalers  c/w prednisone.    3. Afib  c/w xarelto  c/w metoprolol for rate control.    4. CAD  c/w Aspirin, metoprolol, simvastatin, imdur    5. HTN  c/w metoprolol, Hydralazine    6. BPH  c/w flomax    7. HLD  c/w simvastatin    8. CKD  cr stable  monitor    9. DVT ppx  on Xarelto.

## 2021-09-07 NOTE — PROGRESS NOTE ADULT - SUBJECTIVE AND OBJECTIVE BOX
Patient is a 75y old  Male who presents with a chief complaint of sob (06 Sep 2021 20:40)    PAST MEDICAL & SURGICAL HISTORY:  HTN (hypertension)    DM (diabetes mellitus), type 2    High cholesterol    CHF (congestive heart failure)    Pneumonia    COPD (chronic obstructive pulmonary disease)    AICD (automatic cardioverter/defibrillator) present    S/P CABG x 3  cabg3  in 2003    S/P hernia repair  hernia [id number]    INTERVAL HISTORY:  	  MEDICATIONS:  MEDICATIONS  (STANDING):  ALBUTerol    90 MICROgram(s) HFA Inhaler 2 Puff(s) Inhalation every 8 hours  aspirin enteric coated 81 milliGRAM(s) Oral daily  budesonide 160 MICROgram(s)/formoterol 4.5 MICROgram(s) Inhaler 2 Puff(s) Inhalation two times a day  furosemide   Injectable 40 milliGRAM(s) IV Push daily  hydrALAZINE 10 milliGRAM(s) Oral three times a day  insulin glargine Injectable (LANTUS) 40 Unit(s) SubCutaneous at bedtime  insulin lispro (ADMELOG) corrective regimen sliding scale   SubCutaneous three times a day before meals  insulin lispro Injectable (ADMELOG) 6 Unit(s) SubCutaneous three times a day before meals  isosorbide   mononitrate ER Tablet (IMDUR) 60 milliGRAM(s) Oral daily  metoprolol succinate ER 50 milliGRAM(s) Oral daily  predniSONE   Tablet 20 milliGRAM(s) Oral daily  rivaroxaban 15 milliGRAM(s) Oral with dinner  simvastatin 40 milliGRAM(s) Oral at bedtime  tamsulosin 0.4 milliGRAM(s) Oral at bedtime    Vitals:  T(F): 97.4 (09-07-21 @ 04:49), Max: 98.1 (09-06-21 @ 10:22)  HR: 72 (09-07-21 @ 08:00) (67 - 72)  BP: 142/80 (09-07-21 @ 07:14) (109/58 - 147/75)  RR: 18 (09-07-21 @ 07:14) (18 - 18)  SpO2: 100% (09-07-21 @ 08:00) (95% - 100%)    09-06 @ 07:01  -  09-07 @ 07:00  --------------------------------------------------------  IN:  Total IN: 0 mL    OUT:    Voided (mL): 600 mL  Total OUT: 600 mL    Total NET: -600 mL    Weight (kg): 112.4 (09-04 @ 12:07)  BMI (kg/m2): 35 (09-04 @ 12:07)    PHYSICAL EXAM:  Neuro: Awake, responsive  CV: S1 S2 RRR  Lungs: CTABL  GI: Soft, BS +, ND, NT  Extremities: No edema    TELEMETRY:     RADIOLOGY: < from: Xray Chest 1 View-PORTABLE IMMEDIATE (Xray Chest 1 View-PORTABLE IMMEDIATE .) (09.04.21 @ 07:13) >  Heart enlargement, sternotomy, left-sided defibrillator again noted.    On July 11 of this year there were mild central congestive changes.    Presently there are moderate to severe congestive changes.    IMPRESSION: Markedly increased CHF.    < end of copied text >    DIAGNOSTIC TESTING:    [x ] Echocardiogram:< from: TTE Echo Complete w/o Contrast w/ Doppler (07.13.21 @ 10:39) >  Left Ventricle: The left ventricular internal cavity size is mildly increased.  Global LV systolic function was mildly decreased. Left ventricular ejection fraction, by visual estimation, is 40 to 45%. The left ventricular diastolic function could not be assessed in this study.      LV Wall Scoring:  The mid and apical anterior wall, mid and apical anterior septum, mid and  apical inferior septum, and mid anterolateral segment are hypokinetic.    Right Ventricle: The right ventricular size is mildly enlarged. RV systolic function is mildly reduced.  Left Atrium: Normal left atrial size.  Right Atrium: Normal right atrial size.  Pericardium: There is no evidence of pericardial effusion.  Mitral Valve: Mild thickening and calcification of the anterior and posterior mitral valve leaflets. Mitral leaflet mobility is normal. There is mild mitral annular calcification. Trace mitral valve regurgitation is seen.  Tricuspid Valve: The tricuspid valve is normal in structure. Mild tricuspid regurgitation is visualized.  Aortic Valve: The aortic valve is trileaflet. Moderate to severe aortic stenosis is present. The peak aortic velocity was obtained from the apical view. Moderate aortic valve regurgitation is seen.  Pulmonic Valve: The pulmonic valve was not well visualized. Mild pulmonic valve regurgitation.  Aorta: Aortic root measured at Sinus of Valsalva is normal. There is mild aortic root calcification.  Venous: IVC not well-visualized.  Additional Comments: A pacer wire is visualized in the right atrium and right ventricle.      Summary:   1. Left ventricular ejection fraction,by visual estimation, is 40 to 45%.   2. Technically difficult study.   3. Mildly decreased global left ventricular systolic function.   4. Mid and apical anterior wall, mid and apical anterior septum, mid and apical inferior septum, and mid anterolateral segment are abnormal as described above.   5. Mildly increased left ventricular internal cavity size.   6. The left ventricular diastolic function could not be assessed in this study.   7. There is mild concentric left ventricular hypertrophy.  8. Mildly enlarged right ventricle.   9. Mildly reduced RV systolic function.  10. Normal left atrial size.  11. Normal right atrial size.  12. There is no evidence of pericardial effusion.  13. Mild mitral annular calcification.  14. Mild thickeningand calcification of the anterior and posterior mitral valve leaflets.  15. Trace mitral valve regurgitation.  16. Mild tricuspid regurgitation.  17. Moderate aortic regurgitation.  18. Moderate to severe aortic valve stenosis.  19. Mild pulmonic valve regurgitation.  20. C/w the study of 3-14-21, findings are similar.  21. There is mild aortic root calcification.    < end of copied text >    [x ] Stress Test:  < from: NM Pharm Stress Test, Dual Isotope (03.17.21 @ 12:40) >  1. There was scintigraphic evidence for a myocardial infarct in the RCA territory with no significant ischemia.    2. There is severely abnormal left ventricular contractility, globally diminished calculated ejection fraction and no wall motion abnormlaities. Overall post stress ejection fraction was 21%    < end of copied text >    LABS:	 	    05 Sep 2021 07:13    138    |  103    |  37     ----------------------------<  389    5.0     |  33     |  2.10                         11.3   11.24 )-----------( 104      ( 05 Sep 2021 07:13 )             35.6                Patient is a 75y old  Male who presents with a chief complaint of sob (06 Sep 2021 20:40)    PAST MEDICAL & SURGICAL HISTORY:  HTN (hypertension)    DM (diabetes mellitus), type 2    High cholesterol    CHF (congestive heart failure)    Pneumonia    COPD (chronic obstructive pulmonary disease)    AICD (automatic cardioverter/defibrillator) present    S/P CABG x 3  cabg3  in 2003    S/P hernia repair  hernia [id number]    INTERVAL HISTORY: In no acute distress, breathing had   	  MEDICATIONS:  MEDICATIONS  (STANDING):  ALBUTerol    90 MICROgram(s) HFA Inhaler 2 Puff(s) Inhalation every 8 hours  aspirin enteric coated 81 milliGRAM(s) Oral daily  budesonide 160 MICROgram(s)/formoterol 4.5 MICROgram(s) Inhaler 2 Puff(s) Inhalation two times a day  furosemide   Injectable 40 milliGRAM(s) IV Push daily  hydrALAZINE 10 milliGRAM(s) Oral three times a day  insulin glargine Injectable (LANTUS) 40 Unit(s) SubCutaneous at bedtime  insulin lispro (ADMELOG) corrective regimen sliding scale   SubCutaneous three times a day before meals  insulin lispro Injectable (ADMELOG) 6 Unit(s) SubCutaneous three times a day before meals  isosorbide   mononitrate ER Tablet (IMDUR) 60 milliGRAM(s) Oral daily  metoprolol succinate ER 50 milliGRAM(s) Oral daily  predniSONE   Tablet 20 milliGRAM(s) Oral daily  rivaroxaban 15 milliGRAM(s) Oral with dinner  simvastatin 40 milliGRAM(s) Oral at bedtime  tamsulosin 0.4 milliGRAM(s) Oral at bedtime    Vitals:  T(F): 97.4 (09-07-21 @ 04:49), Max: 98.1 (09-06-21 @ 10:22)  HR: 72 (09-07-21 @ 08:00) (67 - 72)  BP: 142/80 (09-07-21 @ 07:14) (109/58 - 147/75)  RR: 18 (09-07-21 @ 07:14) (18 - 18)  SpO2: 100% (09-07-21 @ 08:00) (95% - 100%)    09-06 @ 07:01  -  09-07 @ 07:00  --------------------------------------------------------  IN:  Total IN: 0 mL    OUT:    Voided (mL): 600 mL  Total OUT: 600 mL    Total NET: -600 mL    Weight (kg): 112.4 (09-04 @ 12:07)  BMI (kg/m2): 35 (09-04 @ 12:07)    PHYSICAL EXAM:  Neuro: Awake, responsive  CV: S1 S2 RRR  Lungs: diminished to bases   GI: Soft, BS +, ND, NT  Extremities: + LE edema    TELEMETRY: paced    RADIOLOGY: < from: Xray Chest 1 View-PORTABLE IMMEDIATE (Xray Chest 1 View-PORTABLE IMMEDIATE .) (09.04.21 @ 07:13) >  Heart enlargement, sternotomy, left-sided defibrillator again noted.    On July 11 of this year there were mild central congestive changes.    Presently there are moderate to severe congestive changes.    IMPRESSION: Markedly increased CHF.    < end of copied text >    DIAGNOSTIC TESTING:    [x ] Echocardiogram:< from: TTE Echo Complete w/o Contrast w/ Doppler (07.13.21 @ 10:39) >  Left Ventricle: The left ventricular internal cavity size is mildly increased.  Global LV systolic function was mildly decreased. Left ventricular ejection fraction, by visual estimation, is 40 to 45%. The left ventricular diastolic function could not be assessed in this study.      LV Wall Scoring:  The mid and apical anterior wall, mid and apical anterior septum, mid and  apical inferior septum, and mid anterolateral segment are hypokinetic.    Right Ventricle: The right ventricular size is mildly enlarged. RV systolic function is mildly reduced.  Left Atrium: Normal left atrial size.  Right Atrium: Normal right atrial size.  Pericardium: There is no evidence of pericardial effusion.  Mitral Valve: Mild thickening and calcification of the anterior and posterior mitral valve leaflets. Mitral leaflet mobility is normal. There is mild mitral annular calcification. Trace mitral valve regurgitation is seen.  Tricuspid Valve: The tricuspid valve is normal in structure. Mild tricuspid regurgitation is visualized.  Aortic Valve: The aortic valve is trileaflet. Moderate to severe aortic stenosis is present. The peak aortic velocity was obtained from the apical view. Moderate aortic valve regurgitation is seen.  Pulmonic Valve: The pulmonic valve was not well visualized. Mild pulmonic valve regurgitation.  Aorta: Aortic root measured at Sinus of Valsalva is normal. There is mild aortic root calcification.  Venous: IVC not well-visualized.  Additional Comments: A pacer wire is visualized in the right atrium and right ventricle.      Summary:   1. Left ventricular ejection fraction,by visual estimation, is 40 to 45%.   2. Technically difficult study.   3. Mildly decreased global left ventricular systolic function.   4. Mid and apical anterior wall, mid and apical anterior septum, mid and apical inferior septum, and mid anterolateral segment are abnormal as described above.   5. Mildly increased left ventricular internal cavity size.   6. The left ventricular diastolic function could not be assessed in this study.   7. There is mild concentric left ventricular hypertrophy.  8. Mildly enlarged right ventricle.   9. Mildly reduced RV systolic function.  10. Normal left atrial size.  11. Normal right atrial size.  12. There is no evidence of pericardial effusion.  13. Mild mitral annular calcification.  14. Mild thickeningand calcification of the anterior and posterior mitral valve leaflets.  15. Trace mitral valve regurgitation.  16. Mild tricuspid regurgitation.  17. Moderate aortic regurgitation.  18. Moderate to severe aortic valve stenosis.  19. Mild pulmonic valve regurgitation.  20. C/w the study of 3-14-21, findings are similar.  21. There is mild aortic root calcification.    < end of copied text >    [x ] Stress Test:  < from: NM Pharm Stress Test, Dual Isotope (03.17.21 @ 12:40) >  1. There was scintigraphic evidence for a myocardial infarct in the RCA territory with no significant ischemia.    2. There is severely abnormal left ventricular contractility, globally diminished calculated ejection fraction and no wall motion abnormlaities. Overall post stress ejection fraction was 21%    < end of copied text >    LABS:	 	    05 Sep 2021 07:13    138    |  103    |  37     ----------------------------<  389    5.0     |  33     |  2.10                         11.3   11.24 )-----------( 104      ( 05 Sep 2021 07:13 )             35.6                Patient is a 75y old  Male who presents with a chief complaint of sob (06 Sep 2021 20:40)    PAST MEDICAL & SURGICAL HISTORY:  HTN (hypertension)    DM (diabetes mellitus), type 2    High cholesterol    CHF (congestive heart failure)    Pneumonia    COPD (chronic obstructive pulmonary disease)    AICD (automatic cardioverter/defibrillator) present    S/P CABG x 3  cabg3  in 2003    S/P hernia repair  hernia [id number]    INTERVAL HISTORY: In no acute distress, breathing has improved   	  MEDICATIONS:  MEDICATIONS  (STANDING):  ALBUTerol    90 MICROgram(s) HFA Inhaler 2 Puff(s) Inhalation every 8 hours  aspirin enteric coated 81 milliGRAM(s) Oral daily  budesonide 160 MICROgram(s)/formoterol 4.5 MICROgram(s) Inhaler 2 Puff(s) Inhalation two times a day  furosemide   Injectable 40 milliGRAM(s) IV Push daily  hydrALAZINE 10 milliGRAM(s) Oral three times a day  insulin glargine Injectable (LANTUS) 40 Unit(s) SubCutaneous at bedtime  insulin lispro (ADMELOG) corrective regimen sliding scale   SubCutaneous three times a day before meals  insulin lispro Injectable (ADMELOG) 6 Unit(s) SubCutaneous three times a day before meals  isosorbide   mononitrate ER Tablet (IMDUR) 60 milliGRAM(s) Oral daily  metoprolol succinate ER 50 milliGRAM(s) Oral daily  predniSONE   Tablet 20 milliGRAM(s) Oral daily  rivaroxaban 15 milliGRAM(s) Oral with dinner  simvastatin 40 milliGRAM(s) Oral at bedtime  tamsulosin 0.4 milliGRAM(s) Oral at bedtime    Vitals:  T(F): 97.4 (09-07-21 @ 04:49), Max: 98.1 (09-06-21 @ 10:22)  HR: 72 (09-07-21 @ 08:00) (67 - 72)  BP: 142/80 (09-07-21 @ 07:14) (109/58 - 147/75)  RR: 18 (09-07-21 @ 07:14) (18 - 18)  SpO2: 100% (09-07-21 @ 08:00) (95% - 100%)    09-06 @ 07:01  -  09-07 @ 07:00  --------------------------------------------------------  IN:  Total IN: 0 mL    OUT:    Voided (mL): 600 mL  Total OUT: 600 mL    Total NET: -600 mL    Weight (kg): 112.4 (09-04 @ 12:07)  BMI (kg/m2): 35 (09-04 @ 12:07)    PHYSICAL EXAM:  Neuro: Awake, responsive  CV: S1 S2 RRR + SM  Lungs: diminished to bases   GI: Soft, BS +, ND, NT  Extremities: + LE edema    TELEMETRY: paced    RADIOLOGY: < from: Xray Chest 1 View-PORTABLE IMMEDIATE (Xray Chest 1 View-PORTABLE IMMEDIATE .) (09.04.21 @ 07:13) >  Heart enlargement, sternotomy, left-sided defibrillator again noted.    On July 11 of this year there were mild central congestive changes.    Presently there are moderate to severe congestive changes.    IMPRESSION: Markedly increased CHF.    < end of copied text >    DIAGNOSTIC TESTING:    [x ] Echocardiogram:< from: TTE Echo Complete w/o Contrast w/ Doppler (07.13.21 @ 10:39) >  Left Ventricle: The left ventricular internal cavity size is mildly increased.  Global LV systolic function was mildly decreased. Left ventricular ejection fraction, by visual estimation, is 40 to 45%. The left ventricular diastolic function could not be assessed in this study.      LV Wall Scoring:  The mid and apical anterior wall, mid and apical anterior septum, mid and  apical inferior septum, and mid anterolateral segment are hypokinetic.    Right Ventricle: The right ventricular size is mildly enlarged. RV systolic function is mildly reduced.  Left Atrium: Normal left atrial size.  Right Atrium: Normal right atrial size.  Pericardium: There is no evidence of pericardial effusion.  Mitral Valve: Mild thickening and calcification of the anterior and posterior mitral valve leaflets. Mitral leaflet mobility is normal. There is mild mitral annular calcification. Trace mitral valve regurgitation is seen.  Tricuspid Valve: The tricuspid valve is normal in structure. Mild tricuspid regurgitation is visualized.  Aortic Valve: The aortic valve is trileaflet. Moderate to severe aortic stenosis is present. The peak aortic velocity was obtained from the apical view. Moderate aortic valve regurgitation is seen.  Pulmonic Valve: The pulmonic valve was not well visualized. Mild pulmonic valve regurgitation.  Aorta: Aortic root measured at Sinus of Valsalva is normal. There is mild aortic root calcification.  Venous: IVC not well-visualized.  Additional Comments: A pacer wire is visualized in the right atrium and right ventricle.      Summary:   1. Left ventricular ejection fraction,by visual estimation, is 40 to 45%.   2. Technically difficult study.   3. Mildly decreased global left ventricular systolic function.   4. Mid and apical anterior wall, mid and apical anterior septum, mid and apical inferior septum, and mid anterolateral segment are abnormal as described above.   5. Mildly increased left ventricular internal cavity size.   6. The left ventricular diastolic function could not be assessed in this study.   7. There is mild concentric left ventricular hypertrophy.  8. Mildly enlarged right ventricle.   9. Mildly reduced RV systolic function.  10. Normal left atrial size.  11. Normal right atrial size.  12. There is no evidence of pericardial effusion.  13. Mild mitral annular calcification.  14. Mild thickeningand calcification of the anterior and posterior mitral valve leaflets.  15. Trace mitral valve regurgitation.  16. Mild tricuspid regurgitation.  17. Moderate aortic regurgitation.  18. Moderate to severe aortic valve stenosis.  19. Mild pulmonic valve regurgitation.  20. C/w the study of 3-14-21, findings are similar.  21. There is mild aortic root calcification.    < end of copied text >    [x ] Stress Test:  < from: NM Pharm Stress Test, Dual Isotope (03.17.21 @ 12:40) >  1. There was scintigraphic evidence for a myocardial infarct in the RCA territory with no significant ischemia.    2. There is severely abnormal left ventricular contractility, globally diminished calculated ejection fraction and no wall motion abnormlaities. Overall post stress ejection fraction was 21%    < end of copied text >    LABS:	 	    05 Sep 2021 07:13    138    |  103    |  37     ----------------------------<  389    5.0     |  33     |  2.10                         11.3   11.24 )-----------( 104      ( 05 Sep 2021 07:13 )             35.6

## 2021-09-07 NOTE — DIETITIAN INITIAL EVALUATION ADULT. - PERTINENT LABORATORY DATA
09-07 Na144 mmol/L Glu 169 mg/dL<H> K+ 4.4 mmol/L Cr  1.87 mg/dL<H> BUN 47 mg/dL<H> WBC --    09-04 Alb 3.2 g/dL<L>

## 2021-09-07 NOTE — PROGRESS NOTE ADULT - SUBJECTIVE AND OBJECTIVE BOX
INTERVAL HPI:  74yo, BM with HTN, HLD, DM, CAD with CABG+ stents, Diastolic CHF, S/P AICD, Chronic A Fib( per [facility] note on July 11th 2021), COPD (on home o2 2L), Obesity, EMBER on CPAP( reports compliance),  Gout.  Smoked close to 40 years, quit 20 years ago.  Brought in for worsening SOB over several days, when asked for trigger , says was drinking too much water+ soda as weather was hot and humid.  EMS reported SPO2 of 76% on room air and BP of 190/100.  Placed on BIPAP by EMS and givem NTG spray x5.  SPO2 90% upon arrival to ED.  Denies fever, chills, new cough or sputum production.  Admitted with acute on chronic hypoxic hypercarbic Respiratory failure due to decompensated CHF.    OVERNIGHT EVENTS:  Awake comfortable and feels better.    Vital Signs Last 24 Hrs  T(C): 36.7 (07 Sep 2021 10:11), Max: 36.7 (07 Sep 2021 10:11)  T(F): 98.1 (07 Sep 2021 10:11), Max: 98.1 (07 Sep 2021 10:11)  HR: 76 (07 Sep 2021 11:39) (67 - 76)  BP: 122/75 (07 Sep 2021 10:11) (109/58 - 147/75)  BP(mean): --  RR: 18 (07 Sep 2021 10:11) (18 - 18)  SpO2: 98% (07 Sep 2021 11:39) (95% - 100%)    PHYSICAL EXAM:  GEN:         Awake, responsive and comfortable.  HEENT:    Normal.    RESP:       no wheezing.  CVS:          Regular rate and rhythm.   ABD:         Soft, non-tender, non-distended;     MEDICATIONS  (STANDING):  ALBUTerol    90 MICROgram(s) HFA Inhaler 2 Puff(s) Inhalation every 8 hours  aspirin enteric coated 81 milliGRAM(s) Oral daily  budesonide 160 MICROgram(s)/formoterol 4.5 MICROgram(s) Inhaler 2 Puff(s) Inhalation two times a day  dextrose 40% Gel 15 Gram(s) Oral once  dextrose 5%. 1000 milliLiter(s) (50 mL/Hr) IV Continuous <Continuous>  dextrose 5%. 1000 milliLiter(s) (100 mL/Hr) IV Continuous <Continuous>  dextrose 50% Injectable 25 Gram(s) IV Push once  dextrose 50% Injectable 12.5 Gram(s) IV Push once  dextrose 50% Injectable 25 Gram(s) IV Push once  furosemide   Injectable 40 milliGRAM(s) IV Push daily  glucagon  Injectable 1 milliGRAM(s) IntraMuscular once  hydrALAZINE 10 milliGRAM(s) Oral three times a day  insulin glargine Injectable (LANTUS) 40 Unit(s) SubCutaneous at bedtime  insulin lispro (ADMELOG) corrective regimen sliding scale   SubCutaneous three times a day before meals  insulin lispro Injectable (ADMELOG) 6 Unit(s) SubCutaneous three times a day before meals  isosorbide   mononitrate ER Tablet (IMDUR) 60 milliGRAM(s) Oral daily  metoprolol succinate ER 50 milliGRAM(s) Oral daily  predniSONE   Tablet 20 milliGRAM(s) Oral daily  rivaroxaban 15 milliGRAM(s) Oral with dinner  simvastatin 40 milliGRAM(s) Oral at bedtime  tamsulosin 0.4 milliGRAM(s) Oral at bedtime    LABS:    09-07    144  |  108  |  47<H>  ----------------------------<  169<H>  4.4   |  34<H>  |  1.87<H>    Ca    8.7      07 Sep 2021 10:30  Mg     2.1     09-07 09-04 @ 06:58  pH: 7.37  pCO2: 47  pO2: 313  SaO2: 100    ASSESSMENT AND PLAN:  ·	Acute on chronic hypoxic Respiratory failure.  ·	Acute on chronic systolic + diastolic CHF.  ·	Aortic Regurgitation+ Stenosis.  ·	Acute COPD exacerbation.  ·	Leukocytosis.  ·	Renal Insuffiencey.  ·	CAD with CABG+ stents.  ·	S/P AICD.  ·	Chronic A Fib  ·	Obesity.  ·	EMBER.  ·	HTN.  ·	HLD.  ·	DM.  ·	Gout.    Improving. Continue diuretics., along with fluid restriction.  On Symbicort, Xarelto and as needed bronchodilators.  continue O2 with nocturnal BIPAP.  Being considered for MARCIA, possible valve repair.

## 2021-09-07 NOTE — DIETITIAN INITIAL EVALUATION ADULT. - PERTINENT MEDS FT
MEDICATIONS  (STANDING):  ALBUTerol    90 MICROgram(s) HFA Inhaler 2 Puff(s) Inhalation every 8 hours  aspirin enteric coated 81 milliGRAM(s) Oral daily  budesonide 160 MICROgram(s)/formoterol 4.5 MICROgram(s) Inhaler 2 Puff(s) Inhalation two times a day  dextrose 40% Gel 15 Gram(s) Oral once  dextrose 5%. 1000 milliLiter(s) (50 mL/Hr) IV Continuous <Continuous>  dextrose 5%. 1000 milliLiter(s) (100 mL/Hr) IV Continuous <Continuous>  dextrose 50% Injectable 25 Gram(s) IV Push once  dextrose 50% Injectable 12.5 Gram(s) IV Push once  dextrose 50% Injectable 25 Gram(s) IV Push once  furosemide   Injectable 40 milliGRAM(s) IV Push daily  glucagon  Injectable 1 milliGRAM(s) IntraMuscular once  hydrALAZINE 10 milliGRAM(s) Oral three times a day  insulin glargine Injectable (LANTUS) 40 Unit(s) SubCutaneous at bedtime  insulin lispro (ADMELOG) corrective regimen sliding scale   SubCutaneous three times a day before meals  insulin lispro Injectable (ADMELOG) 6 Unit(s) SubCutaneous three times a day before meals  isosorbide   mononitrate ER Tablet (IMDUR) 60 milliGRAM(s) Oral daily  metoprolol succinate ER 50 milliGRAM(s) Oral daily  predniSONE   Tablet 20 milliGRAM(s) Oral daily  rivaroxaban 15 milliGRAM(s) Oral with dinner  simvastatin 40 milliGRAM(s) Oral at bedtime  tamsulosin 0.4 milliGRAM(s) Oral at bedtime    MEDICATIONS  (PRN):

## 2021-09-07 NOTE — PROGRESS NOTE ADULT - SUBJECTIVE AND OBJECTIVE BOX
INTERVAL HPI/OVERNIGHT EVENTS: Pt seen and examined at bedside. Denies any complaints. No events overnight.     75y  Vital Signs Last 24 Hrs  T(C): 36.7 (07 Sep 2021 10:11), Max: 36.7 (07 Sep 2021 10:11)  T(F): 98.1 (07 Sep 2021 10:11), Max: 98.1 (07 Sep 2021 10:11)  HR: 76 (07 Sep 2021 11:39) (67 - 76)  BP: 122/75 (07 Sep 2021 10:11) (109/58 - 147/75)  BP(mean): --  RR: 18 (07 Sep 2021 10:11) (18 - 18)  SpO2: 98% (07 Sep 2021 11:39) (95% - 100%)  I&O's Summary    06 Sep 2021 07:01  -  07 Sep 2021 07:00  --------------------------------------------------------  IN: 0 mL / OUT: 600 mL / NET: -600 mL      MEDICATIONS  (STANDING):  ALBUTerol    90 MICROgram(s) HFA Inhaler 2 Puff(s) Inhalation every 8 hours  aspirin enteric coated 81 milliGRAM(s) Oral daily  budesonide 160 MICROgram(s)/formoterol 4.5 MICROgram(s) Inhaler 2 Puff(s) Inhalation two times a day  dextrose 40% Gel 15 Gram(s) Oral once  dextrose 5%. 1000 milliLiter(s) (50 mL/Hr) IV Continuous <Continuous>  dextrose 5%. 1000 milliLiter(s) (100 mL/Hr) IV Continuous <Continuous>  dextrose 50% Injectable 25 Gram(s) IV Push once  dextrose 50% Injectable 12.5 Gram(s) IV Push once  dextrose 50% Injectable 25 Gram(s) IV Push once  furosemide   Injectable 40 milliGRAM(s) IV Push daily  glucagon  Injectable 1 milliGRAM(s) IntraMuscular once  hydrALAZINE 10 milliGRAM(s) Oral three times a day  insulin glargine Injectable (LANTUS) 40 Unit(s) SubCutaneous at bedtime  insulin lispro (ADMELOG) corrective regimen sliding scale   SubCutaneous three times a day before meals  insulin lispro Injectable (ADMELOG) 6 Unit(s) SubCutaneous three times a day before meals  isosorbide   mononitrate ER Tablet (IMDUR) 60 milliGRAM(s) Oral daily  metoprolol succinate ER 50 milliGRAM(s) Oral daily  predniSONE   Tablet 20 milliGRAM(s) Oral daily  rivaroxaban 15 milliGRAM(s) Oral with dinner  simvastatin 40 milliGRAM(s) Oral at bedtime  tamsulosin 0.4 milliGRAM(s) Oral at bedtime    MEDICATIONS  (PRN):    LABS:    trop    09-07    144  |  108  |  47<H>  ----------------------------<  169<H>  4.4   |  34<H>  |  1.87<H>    Ca    8.7      07 Sep 2021 10:30  Mg     2.1     09-07          CAPILLARY BLOOD GLUCOSE      POCT Blood Glucose.: 223 mg/dL (07 Sep 2021 12:29)  POCT Blood Glucose.: 153 mg/dL (07 Sep 2021 08:26)  POCT Blood Glucose.: 340 mg/dL (06 Sep 2021 22:30)  POCT Blood Glucose.: 348 mg/dL (06 Sep 2021 18:39)  POCT Blood Glucose.: 364 mg/dL (06 Sep 2021 17:21)          REVIEW OF SYSTEMS:  CONSTITUTIONAL: No fever, weight loss, or fatigue  RESPIRATORY: No cough, wheezing, chills or hemoptysis; No shortness of breath  CARDIOVASCULAR: No chest pain, palpitations, dizziness, or leg swelling  GASTROINTESTINAL: No abdominal or epigastric pain. No nausea, vomiting, or hematemesis; No diarrhea or constipation. No melena or hematochezia.  GENITOURINARY: No dysuria, frequency, hematuria, or incontinence  NEUROLOGICAL: No headaches, memory loss, loss of strength, numbness, or tremors  MUSCULOSKELETAL: No joint pain or swelling; No muscle, back, or extremity pain      RADIOLOGY & ADDITIONAL TESTS:    Imaging Personally Reviewed:  [ ] YES  [ ] NO    Consultant(s) Notes Reviewed:  [x ] YES  [ ] NO    PHYSICAL EXAM:  GENERAL: NAD  NERVOUS SYSTEM:  Alert & Oriented X3, moves all extremities.  CHEST/LUNG: bibasilar crackles.  HEART: Regular rate and rhythm; No murmurs, rubs, or gallops  ABDOMEN: Soft, Nontender, Nondistended; Bowel sounds present  EXTREMITIES:  2+ Peripheral Pulses, + edema.    A & P:        Care Discussed with Consultants/Other Providers [ x] YES  [ ] NO

## 2021-09-07 NOTE — DIETITIAN INITIAL EVALUATION ADULT. - OTHER INFO
Pt with multiple admissions for CHF. Pt lives at home with wife who does food shopping/cooking. Pt tries to follow low Sodium & CHO controlled diet at home, however admits he has not been as strict with his diet as he should be lately. Pt with DM2 (A1c 8.6% in 07/2021); pt checks his BG 2-3x daily & takes Toujeo & Trulicity to control his BG at home. Pt appeared to have good knowledge regarding recommended diet, however admitted to some compliance issues recently; emphasized importance of following recommended diet and monitoring for fluid retention, daily weights, etc.; pt appeared receptive. Pt with no difficulty chewing/swallowing.

## 2021-09-08 LAB
ANION GAP SERPL CALC-SCNC: 4 MMOL/L — LOW (ref 5–17)
BUN SERPL-MCNC: 46 MG/DL — HIGH (ref 7–23)
CALCIUM SERPL-MCNC: 9.1 MG/DL — SIGNIFICANT CHANGE UP (ref 8.5–10.1)
CHLORIDE SERPL-SCNC: 108 MMOL/L — SIGNIFICANT CHANGE UP (ref 96–108)
CO2 SERPL-SCNC: 30 MMOL/L — SIGNIFICANT CHANGE UP (ref 22–31)
CREAT SERPL-MCNC: 1.73 MG/DL — HIGH (ref 0.5–1.3)
GLUCOSE BLDC GLUCOMTR-MCNC: 224 MG/DL — HIGH (ref 70–99)
GLUCOSE BLDC GLUCOMTR-MCNC: 267 MG/DL — HIGH (ref 70–99)
GLUCOSE BLDC GLUCOMTR-MCNC: 270 MG/DL — HIGH (ref 70–99)
GLUCOSE BLDC GLUCOMTR-MCNC: 93 MG/DL — SIGNIFICANT CHANGE UP (ref 70–99)
GLUCOSE SERPL-MCNC: 79 MG/DL — SIGNIFICANT CHANGE UP (ref 70–99)
POTASSIUM SERPL-MCNC: 3.6 MMOL/L — SIGNIFICANT CHANGE UP (ref 3.5–5.3)
POTASSIUM SERPL-SCNC: 3.6 MMOL/L — SIGNIFICANT CHANGE UP (ref 3.5–5.3)
SODIUM SERPL-SCNC: 142 MMOL/L — SIGNIFICANT CHANGE UP (ref 135–145)

## 2021-09-08 PROCEDURE — 99233 SBSQ HOSP IP/OBS HIGH 50: CPT

## 2021-09-08 RX ORDER — POTASSIUM CHLORIDE 20 MEQ
20 PACKET (EA) ORAL ONCE
Refills: 0 | Status: COMPLETED | OUTPATIENT
Start: 2021-09-08 | End: 2021-09-08

## 2021-09-08 RX ORDER — FUROSEMIDE 40 MG
40 TABLET ORAL ONCE
Refills: 0 | Status: COMPLETED | OUTPATIENT
Start: 2021-09-08 | End: 2021-09-08

## 2021-09-08 RX ADMIN — Medication 20 MILLIEQUIVALENT(S): at 14:31

## 2021-09-08 RX ADMIN — Medication 50 MILLIGRAM(S): at 05:56

## 2021-09-08 RX ADMIN — Medication 6 UNIT(S): at 17:41

## 2021-09-08 RX ADMIN — Medication 40 MILLIGRAM(S): at 14:23

## 2021-09-08 RX ADMIN — Medication 2: at 11:32

## 2021-09-08 RX ADMIN — RIVAROXABAN 15 MILLIGRAM(S): KIT at 17:41

## 2021-09-08 RX ADMIN — ALBUTEROL 2 PUFF(S): 90 AEROSOL, METERED ORAL at 22:03

## 2021-09-08 RX ADMIN — ALBUTEROL 2 PUFF(S): 90 AEROSOL, METERED ORAL at 05:57

## 2021-09-08 RX ADMIN — Medication 40 MILLIGRAM(S): at 05:56

## 2021-09-08 RX ADMIN — Medication 10 MILLIGRAM(S): at 14:23

## 2021-09-08 RX ADMIN — Medication 3: at 17:42

## 2021-09-08 RX ADMIN — Medication 6 UNIT(S): at 08:21

## 2021-09-08 RX ADMIN — TAMSULOSIN HYDROCHLORIDE 0.4 MILLIGRAM(S): 0.4 CAPSULE ORAL at 22:02

## 2021-09-08 RX ADMIN — INSULIN GLARGINE 40 UNIT(S): 100 INJECTION, SOLUTION SUBCUTANEOUS at 22:03

## 2021-09-08 RX ADMIN — Medication 10 MILLIGRAM(S): at 22:03

## 2021-09-08 RX ADMIN — ALBUTEROL 2 PUFF(S): 90 AEROSOL, METERED ORAL at 14:24

## 2021-09-08 RX ADMIN — BUDESONIDE AND FORMOTEROL FUMARATE DIHYDRATE 2 PUFF(S): 160; 4.5 AEROSOL RESPIRATORY (INHALATION) at 17:43

## 2021-09-08 RX ADMIN — Medication 10 MILLIGRAM(S): at 05:56

## 2021-09-08 RX ADMIN — SIMVASTATIN 40 MILLIGRAM(S): 20 TABLET, FILM COATED ORAL at 22:02

## 2021-09-08 RX ADMIN — Medication 20 MILLIGRAM(S): at 05:56

## 2021-09-08 RX ADMIN — ISOSORBIDE MONONITRATE 60 MILLIGRAM(S): 60 TABLET, EXTENDED RELEASE ORAL at 11:18

## 2021-09-08 RX ADMIN — Medication 81 MILLIGRAM(S): at 11:17

## 2021-09-08 RX ADMIN — BUDESONIDE AND FORMOTEROL FUMARATE DIHYDRATE 2 PUFF(S): 160; 4.5 AEROSOL RESPIRATORY (INHALATION) at 05:57

## 2021-09-08 RX ADMIN — Medication 20 MILLIEQUIVALENT(S): at 11:18

## 2021-09-08 RX ADMIN — Medication 6 UNIT(S): at 11:32

## 2021-09-08 NOTE — PROGRESS NOTE ADULT - SUBJECTIVE AND OBJECTIVE BOX
INTERVAL HPI/OVERNIGHT EVENTS: Pt seen and examined at bedside. Denies any complaints. No events overnight.     75y  Vital Signs Last 24 Hrs  T(C): 36.9 (08 Sep 2021 10:42), Max: 36.9 (08 Sep 2021 10:42)  T(F): 98.4 (08 Sep 2021 10:42), Max: 98.4 (08 Sep 2021 10:42)  HR: 70 (08 Sep 2021 10:42) (70 - 82)  BP: 110/67 (08 Sep 2021 10:42) (110/67 - 143/71)  BP(mean): --  RR: 18 (08 Sep 2021 10:42) (18 - 18)  SpO2: 97% (08 Sep 2021 10:42) (95% - 98%)  I&O's Summary    MEDICATIONS  (STANDING):  ALBUTerol    90 MICROgram(s) HFA Inhaler 2 Puff(s) Inhalation every 8 hours  aspirin enteric coated 81 milliGRAM(s) Oral daily  budesonide 160 MICROgram(s)/formoterol 4.5 MICROgram(s) Inhaler 2 Puff(s) Inhalation two times a day  dextrose 40% Gel 15 Gram(s) Oral once  dextrose 5%. 1000 milliLiter(s) (50 mL/Hr) IV Continuous <Continuous>  dextrose 5%. 1000 milliLiter(s) (100 mL/Hr) IV Continuous <Continuous>  dextrose 50% Injectable 25 Gram(s) IV Push once  dextrose 50% Injectable 12.5 Gram(s) IV Push once  dextrose 50% Injectable 25 Gram(s) IV Push once  furosemide   Injectable 40 milliGRAM(s) IV Push daily  glucagon  Injectable 1 milliGRAM(s) IntraMuscular once  hydrALAZINE 10 milliGRAM(s) Oral three times a day  insulin glargine Injectable (LANTUS) 40 Unit(s) SubCutaneous at bedtime  insulin lispro (ADMELOG) corrective regimen sliding scale   SubCutaneous three times a day before meals  insulin lispro Injectable (ADMELOG) 6 Unit(s) SubCutaneous three times a day before meals  isosorbide   mononitrate ER Tablet (IMDUR) 60 milliGRAM(s) Oral daily  metoprolol succinate ER 50 milliGRAM(s) Oral daily  predniSONE   Tablet 20 milliGRAM(s) Oral daily  rivaroxaban 15 milliGRAM(s) Oral with dinner  simvastatin 40 milliGRAM(s) Oral at bedtime  tamsulosin 0.4 milliGRAM(s) Oral at bedtime    MEDICATIONS  (PRN):    LABS:    trop    09-08    142  |  108  |  46<H>  ----------------------------<  79  3.6   |  30  |  1.73<H>    Ca    9.1      08 Sep 2021 07:26  Mg     2.1     09-07          CAPILLARY BLOOD GLUCOSE      POCT Blood Glucose.: 224 mg/dL (08 Sep 2021 11:26)  POCT Blood Glucose.: 93 mg/dL (08 Sep 2021 08:18)  POCT Blood Glucose.: 238 mg/dL (07 Sep 2021 22:12)  POCT Blood Glucose.: 230 mg/dL (07 Sep 2021 17:57)          REVIEW OF SYSTEMS:  CONSTITUTIONAL: No fever, weight loss, or fatigue  RESPIRATORY: No cough, wheezing, chills or hemoptysis; No shortness of breath  CARDIOVASCULAR: No chest pain, palpitations, dizziness, or leg swelling  GASTROINTESTINAL: No abdominal or epigastric pain. No nausea, vomiting, or hematemesis; No diarrhea or constipation. No melena or hematochezia.  GENITOURINARY: No dysuria, frequency, hematuria, or incontinence  NEUROLOGICAL: No headaches, memory loss, loss of strength, numbness, or tremors  MUSCULOSKELETAL: No joint pain or swelling; No muscle, back, or extremity pain      RADIOLOGY & ADDITIONAL TESTS:    Imaging Personally Reviewed:  [ ] YES  [ ] NO    Consultant(s) Notes Reviewed:  [x ] YES  [ ] NO    PHYSICAL EXAM:  GENERAL: NAD  NERVOUS SYSTEM:  Alert & Oriented X3, moves all extremities.  CHEST/LUNG: Diminished BS b/l  HEART: Regular rate and rhythm; No murmurs, rubs, or gallops  ABDOMEN: Soft, Nontender, Nondistended; Bowel sounds present  EXTREMITIES: + edema      A & P:        Care Discussed with Consultants/Other Providers [ x] YES  [ ] NO

## 2021-09-08 NOTE — PROGRESS NOTE ADULT - ASSESSMENT
74yo male with known hx HTN, HLD, DM, COPD (on home o2 2L), chronic systolic CHF with LVEF 40%, CAD s/p 4 vessel CABG with AICD for ischemic cardiomyopathy.  Admitted with worsening respiratory distress and LE edema over the last few days.   EMS found him in resp failure and hypertensive 200/100s with rales in bilateral lung fields.  SPO2 70% RA  Pt placed on CPAP, given NTG spray x 5, lasix.   SPO2 90% on ED arrival.  creat baseline 2s  Med and diet compliant as per pt.  BNP 3900  trop 0.2    Imp:  Acute on Chronic Systolic/Diastolic Heart Failure  Hypoxemic Hypercarbic Resp Failure  Hypertensive Urgency  Aortic Stenosis  CAD hx  EF 40-45% mild TR, NC, mod AR, mod - sev AS    -monitor on tele  -cont diuresis with IV Lasix daily, creat 1.7 -> 2.1  -monitor lytes and creat; baseline DENISHA   -echo with concern for severe AS; will tx to [facility] when pulm status stable for MARCIA  -cont home asa/metoprolol/imdur/statin/xarelto  -cont hydralazine for BP control  -cont nebs, steroids, and O2 for COPD 76yo male with known hx HTN, HLD, DM, COPD (on home o2 2L), chronic systolic CHF with LVEF 40%, CAD s/p 4 vessel CABG with AICD for ischemic cardiomyopathy.  Admitted with worsening respiratory distress and LE edema over the last few days.   EMS found him in resp failure and hypertensive 200/100s with rales in bilateral lung fields. SPO2 70% RA  Pt placed on CPAP, given NTG spray x 5, Lasix   SPO2 90% on ED arrival.  creat baseline 2s  Med and diet compliant as per pt., Admits to increased fluid intake  BNP 3900  trop 0.2    Imp:  Acute on Chronic Systolic/Diastolic Heart Failure  Hypoxemic Hypercarbic Resp Failure  Hypertensive Urgency  Aortic Stenosis  CAD hx  EF 40-45% mild TR, UT, mod AR, mod - sev AS    -monitor on tele  -cont diuresis with IV Lasix daily, creat 1.7 -> 2.1 ->1.7  -monitor lytes and creat; baseline DENISHA   -echo with concern for severe AS; recommend further eval with MARCIA, pt prefers to be discharged home and follow up with his cardiologist Dr. Jewel Stubbs as outpatient   -cont home asa/metoprolol/statin/xarelto  -cont hydralazine/imdur   -cont nebs, steroids, and O2 for COPD, pulm following  -activity as tolerated  74yo male with known hx HTN, HLD, DM, COPD (on home o2 2L), chronic systolic CHF with LVEF 40%, Afib, CAD s/p 4 vessel CABG with AICD for ischemic cardiomyopathy.  Admitted with worsening respiratory distress and LE edema over the last few days.   EMS found him in resp failure and hypertensive 200/100s with rales in bilateral lung fields. SPO2 70% RA  Pt placed on CPAP, given NTG spray x 5, Lasix   SPO2 90% on ED arrival.  creat baseline 2s  Med and diet compliant as per pt., Admits to increased fluid intake  BNP 3900  trop 0.2    Imp:  Acute on Chronic Systolic/Diastolic Heart Failure  Hypoxemic Hypercarbic Resp Failure  Hypertensive Urgency  Aortic Stenosis  CAD hx  EF 40-45% mild TR, HI, mod AR, mod - sev AS    -monitor on tele  -cont diuresis with IV Lasix daily, creat 1.7 -> 2.1 ->1.7  -monitor lytes and creat; baseline DENISHA   -cont home asa/metoprolol/statin/xarelto  -cont hydralazine/imdur   -cont nebs, steroids, and O2 for COPD, pulm following  -activity as tolerated   -echo with concern for severe AS; recommend further eval with MARCIA, pt prefers to be discharged home and follow up with his cardiologist Dr. Jewel Stubbs as outpatient  76yo male with known hx HTN, HLD, DM, COPD (on home o2 2L), chronic systolic CHF with LVEF 40%, Afib, CAD s/p 4 vessel CABG with AICD for ischemic cardiomyopathy.  Admitted with worsening respiratory distress and LE edema over the last few days.   EMS found him in resp failure and hypertensive 200/100s with rales in bilateral lung fields. SPO2 70% RA  Pt placed on CPAP, given NTG spray x 5, Lasix   SPO2 90% on ED arrival.  creat baseline 2s  Med and diet compliant as per pt., Admits to increased fluid intake  BNP 3900  trop 0.2    Imp:  Acute on Chronic Systolic/Diastolic Heart Failure  Hypoxemic Hypercarbic Resp Failure  Hypertensive Urgency  Aortic Stenosis  CAD hx  EF 40-45% mild TR, OH, mod AR, mod - sev AS    -monitor on tele  -cont diuresis with IV Lasix daily, creat 1.7 -> 2.1 ->1.7  -monitor lytes and creat; baseline DENISHA   -cont home asa/metoprolol/statin/xarelto  -cont hydralazine/imdur   -cont nebs, steroids, and O2 for COPD, pulm following  -activity as tolerated   -CHF education, dietician consult   -echo with concern for severe AS; recommend further eval with MARCIA, pt prefers to be discharged home and follow up with his cardiologist Dr. Jewel Stubbs as outpatient

## 2021-09-08 NOTE — PROGRESS NOTE ADULT - SUBJECTIVE AND OBJECTIVE BOX
INTERVAL HPI:   76yo, BM with HTN, HLD, DM, CAD with CABG+ stents, Diastolic CHF, S/P AICD, Chronic A Fib( per [facility] note on July 11th 2021), COPD (on home o2 2L), Obesity, EMBER on CPAP( reports compliance),  Gout.  Smoked close to 40 years, quit 20 years ago.  Brought in for worsening SOB over several days, when asked for trigger , says was drinking too much water+ soda as weather was hot and humid.  EMS reported SPO2 of 76% on room air and BP of 190/100.  Placed on BIPAP by EMS and givem NTG spray x5.  SPO2 90% upon arrival to ED.  Denies fever, chills, new cough or sputum production.  Admitted with acute on chronic hypoxic hypercarbic Respiratory failure due to decompensated CHF.    OVERNIGHT EVENTS:  Feels better.    Vital Signs Last 24 Hrs  T(C): 36.6 (08 Sep 2021 16:16), Max: 36.9 (08 Sep 2021 10:42)  T(F): 97.8 (08 Sep 2021 16:16), Max: 98.4 (08 Sep 2021 10:42)  HR: 70 (08 Sep 2021 16:59) (70 - 82)  BP: 119/83 (08 Sep 2021 16:16) (110/67 - 143/71)  BP(mean): --  RR: 18 (08 Sep 2021 16:16) (18 - 18)  SpO2: 97% (08 Sep 2021 16:59) (95% - 99%)    PHYSICAL EXAM:  GEN:         Awake, responsive and comfortable.  HEENT:    Normal.    RESP:        no wheezing  CVS:          Regular rate and rhythm.     MEDICATIONS  (STANDING):  ALBUTerol    90 MICROgram(s) HFA Inhaler 2 Puff(s) Inhalation every 8 hours  aspirin enteric coated 81 milliGRAM(s) Oral daily  budesonide 160 MICROgram(s)/formoterol 4.5 MICROgram(s) Inhaler 2 Puff(s) Inhalation two times a day  dextrose 40% Gel 15 Gram(s) Oral once  dextrose 5%. 1000 milliLiter(s) (100 mL/Hr) IV Continuous <Continuous>  dextrose 5%. 1000 milliLiter(s) (50 mL/Hr) IV Continuous <Continuous>  dextrose 50% Injectable 25 Gram(s) IV Push once  dextrose 50% Injectable 12.5 Gram(s) IV Push once  dextrose 50% Injectable 25 Gram(s) IV Push once  furosemide   Injectable 40 milliGRAM(s) IV Push daily  glucagon  Injectable 1 milliGRAM(s) IntraMuscular once  hydrALAZINE 10 milliGRAM(s) Oral three times a day  insulin glargine Injectable (LANTUS) 40 Unit(s) SubCutaneous at bedtime  insulin lispro (ADMELOG) corrective regimen sliding scale   SubCutaneous three times a day before meals  insulin lispro Injectable (ADMELOG) 6 Unit(s) SubCutaneous three times a day before meals  isosorbide   mononitrate ER Tablet (IMDUR) 60 milliGRAM(s) Oral daily  metoprolol succinate ER 50 milliGRAM(s) Oral daily  predniSONE   Tablet 20 milliGRAM(s) Oral daily  rivaroxaban 15 milliGRAM(s) Oral with dinner  simvastatin 40 milliGRAM(s) Oral at bedtime  tamsulosin 0.4 milliGRAM(s) Oral at bedtime    LABS:    09-08    142  |  108  |  46<H>  ----------------------------<  79  3.6   |  30  |  1.73<H>    Ca    9.1      08 Sep 2021 07:26  Mg     2.1     09-07 09-04 @ 06:58  pH: 7.37  pCO2: 47  pO2: 313  SaO2: 100    ASSESSMENT AND PLAN:  ·	Acute on chronic hypoxic Respiratory failure.  ·	Acute on chronic systolic + diastolic CHF.  ·	Aortic Regurgitation+ Stenosis.  ·	Acute COPD exacerbation.  ·	Leukocytosis.  ·	Renal Insuffiencey.  ·	CAD with CABG+ stents.  ·	S/P AICD.  ·	Chronic A Fib  ·	Obesity.  ·	EMBER.  ·	HTN.  ·	HLD.  ·	DM.  ·	Gout.    Clinically better.  Continue diuretics., along with fluid restriction.  On Symbicort, Xarelto and as needed bronchodilators.  continue O2 with nocturnal BIPAP.

## 2021-09-08 NOTE — PROGRESS NOTE ADULT - ASSESSMENT
75M  hx DM, HTN, HLD, Obesity, COPD  on home o2L, CHF, CAD s/p stent, s/p quadruple bypass, Afib on xarelto presented to the ER for SOB.     1. Acute hypoxic, Hypercarbic resp Failure   sec to CHF  c/w IV lasix Q24H.  cardiology on board.  ECHO- EF 40-45%, Mod to sev AS  pt does not want to be transferred and would like to discuss with his cardiologist as out patient regarding further management of his AS.  c/w tele monitoring  monitor I & O's  monitor daily weights.  c/w Aspirin, metoprolol, simvastatin, imdur.    2. COPD  on Home O2  c/w inhalers  c/w prednisone.    3. Afib  c/w xarelto  c/w metoprolol for rate control.    4. CAD  c/w Aspirin, metoprolol, simvastatin, imdur    5. HTN  c/w metoprolol, Hydralazine    6. BPH  c/w flomax    7. HLD  c/w simvastatin    8. CKD  cr stable  monitor    9. DVT ppx  on Xarelto.

## 2021-09-08 NOTE — PROGRESS NOTE ADULT - SUBJECTIVE AND OBJECTIVE BOX
Patient is a 75y old  Male who presents with a chief complaint of sob (08 Sep 2021 13:04)      PAST MEDICAL & SURGICAL HISTORY:  HTN (hypertension)    DM (diabetes mellitus), type 2    High cholesterol    CHF (congestive heart failure)    Pneumonia    COPD (chronic obstructive pulmonary disease)    Pacemaker    AICD (automatic cardioverter/defibrillator) present    S/P CABG x 3  cabg3  in 2003    S/P cardiac pacemaker procedure  defibrilator 2012    S/P cardiac cath  cardiac stentin 2011    S/P hernia repair  hernia [id number]    INTERVAL HISTORY: Resting in chair, in no acute distress   	  MEDICATIONS:  MEDICATIONS  (STANDING):  ALBUTerol    90 MICROgram(s) HFA Inhaler 2 Puff(s) Inhalation every 8 hours  aspirin enteric coated 81 milliGRAM(s) Oral daily  budesonide 160 MICROgram(s)/formoterol 4.5 MICROgram(s) Inhaler 2 Puff(s) Inhalation two times a day  furosemide   Injectable 40 milliGRAM(s) IV Push daily  hydrALAZINE 10 milliGRAM(s) Oral three times a day  insulin glargine Injectable (LANTUS) 40 Unit(s) SubCutaneous at bedtime  insulin lispro (ADMELOG) corrective regimen sliding scale   SubCutaneous three times a day before meals  insulin lispro Injectable (ADMELOG) 6 Unit(s) SubCutaneous three times a day before meals  isosorbide   mononitrate ER Tablet (IMDUR) 60 milliGRAM(s) Oral daily  metoprolol succinate ER 50 milliGRAM(s) Oral daily  predniSONE   Tablet 20 milliGRAM(s) Oral daily  rivaroxaban 15 milliGRAM(s) Oral with dinner  simvastatin 40 milliGRAM(s) Oral at bedtime  tamsulosin 0.4 milliGRAM(s) Oral at bedtime    Vitals:  T(F): 98.4 (09-08-21 @ 10:42), Max: 98.4 (09-08-21 @ 10:42)  HR: 70 (09-08-21 @ 10:42) (70 - 82)  BP: 110/67 (09-08-21 @ 10:42) (110/67 - 143/71)  RR: 18 (09-08-21 @ 10:42) (18 - 18)  SpO2: 97% (09-08-21 @ 10:42) (95% - 98%)    PHYSICAL EXAM:  Neuro: Awake, responsive  CV: S1 S2 RRR + SM  Lungs: diminished to bases   GI: Soft, BS +, ND, NT  Extremities: N+ LE edema    TELEMETRY: Paced, few PVCs    RADIOLOGY: < from: Xray Chest 1 View-PORTABLE IMMEDIATE (Xray Chest 1 View-PORTABLE IMMEDIATE .) (09.04.21 @ 07:13) >  Markedly increased CHF.    < end of copied text >    DIAGNOSTIC TESTING:    [x ] Echocardiogram: < from: TTE Echo Complete w/o Contrast w/ Doppler (07.13.21 @ 10:39) >  Left Ventricle: The left ventricular internal cavity size is mildly increased.  Global LV systolic function was mildly decreased. Left ventricular ejection fraction, by visual estimation, is 40 to 45%. The left ventricular diastolic function could not be assessed in this study.      LV Wall Scoring:  The mid and apical anterior wall, mid and apical anterior septum, mid and  apical inferior septum, and mid anterolateral segment are hypokinetic.    Right Ventricle: The right ventricular size is mildly enlarged. RV systolic function is mildly reduced.  Left Atrium: Normal left atrial size.  Right Atrium: Normal right atrial size.  Pericardium: There is no evidence of pericardial effusion.  Mitral Valve: Mild thickening and calcification of the anterior and posterior mitral valve leaflets. Mitral leaflet mobility is normal. There is mild mitral annular calcification. Trace mitral valve regurgitation is seen.  Tricuspid Valve: The tricuspid valve is normal in structure. Mild tricuspid regurgitation is visualized.  Aortic Valve: The aortic valve is trileaflet. Moderate to severe aortic stenosis is present. The peak aortic velocity was obtained from the apical view. Moderate aortic valve regurgitation is seen.  Pulmonic Valve: The pulmonic valve was not well visualized. Mild pulmonic valve regurgitation.  Aorta: Aortic root measured at Sinus of Valsalva is normal. There is mild aortic root calcification.  Venous: IVC not well-visualized.  Additional Comments: A pacer wire is visualized in the right atrium and right ventricle.      Summary:   1. Left ventricular ejection fraction,by visual estimation, is 40 to 45%.   2. Technically difficult study.   3. Mildly decreased global left ventricular systolic function.   4. Mid and apical anterior wall, mid and apical anterior septum, mid and apical inferior septum, and mid anterolateral segment are abnormal as described above.   5. Mildly increased left ventricular internal cavity size.   6. The left ventricular diastolic function could not be assessed in this study.   7. There is mild concentric left ventricular hypertrophy.  8. Mildly enlarged right ventricle.   9. Mildly reduced RV systolic function.  10. Normal left atrial size.  11. Normal right atrial size.  12. There is no evidence of pericardial effusion.  13. Mild mitral annular calcification.  14. Mild thickeningand calcification of the anterior and posterior mitral valve leaflets.  15. Trace mitral valve regurgitation.  16. Mild tricuspid regurgitation.  17. Moderate aortic regurgitation.  18. Moderate to severe aortic valve stenosis.  19. Mild pulmonic valve regurgitation.  20. C/w the study of 3-14-21, findings are similar.  21. There is mild aortic root calcification.    < end of copied text >    [x ] Stress Test:  < from: NM Pharm Stress Test, Dual Isotope (03.17.21 @ 12:40) >    1. There was scintigraphic evidence for a myocardial infarct in the RCA territory with no significant ischemia.    2. There is severely abnormal left ventricular contractility, globally diminished calculated ejection fraction and no wall motion abnormlaities. Overall post stress ejection fraction was 21%    < end of copied text >    LABS:	 	    08 Sep 2021 07:26    142    |  108    |  46     ----------------------------<  79     3.6     |  30     |  1.73   07 Sep 2021 10:30    144    |  108    |  47     ----------------------------<  169    4.4     |  34     |  1.87     Ca    9.1        08 Sep 2021 07:26  Mg     2.1       07 Sep 2021 10:30           Patient is a 75y old  Male who presents with a chief complaint of sob (08 Sep 2021 13:04)    PAST MEDICAL & SURGICAL HISTORY:  HTN (hypertension)    DM (diabetes mellitus), type 2    High cholesterol    CHF (congestive heart failure)    Pneumonia    CAD    COPD (chronic obstructive pulmonary disease)    S/P CABG x 3    S/P cardiac pacemaker procedure  defibrilator 2012    S/P hernia repair  hernia [id number]    INTERVAL HISTORY: Resting in chair, in no acute distress   	  MEDICATIONS:  MEDICATIONS  (STANDING):  ALBUTerol    90 MICROgram(s) HFA Inhaler 2 Puff(s) Inhalation every 8 hours  aspirin enteric coated 81 milliGRAM(s) Oral daily  budesonide 160 MICROgram(s)/formoterol 4.5 MICROgram(s) Inhaler 2 Puff(s) Inhalation two times a day  furosemide   Injectable 40 milliGRAM(s) IV Push daily  hydrALAZINE 10 milliGRAM(s) Oral three times a day  insulin glargine Injectable (LANTUS) 40 Unit(s) SubCutaneous at bedtime  insulin lispro (ADMELOG) corrective regimen sliding scale   SubCutaneous three times a day before meals  insulin lispro Injectable (ADMELOG) 6 Unit(s) SubCutaneous three times a day before meals  isosorbide   mononitrate ER Tablet (IMDUR) 60 milliGRAM(s) Oral daily  metoprolol succinate ER 50 milliGRAM(s) Oral daily  predniSONE   Tablet 20 milliGRAM(s) Oral daily  rivaroxaban 15 milliGRAM(s) Oral with dinner  simvastatin 40 milliGRAM(s) Oral at bedtime  tamsulosin 0.4 milliGRAM(s) Oral at bedtime    Vitals:  T(F): 98.4 (09-08-21 @ 10:42), Max: 98.4 (09-08-21 @ 10:42)  HR: 70 (09-08-21 @ 10:42) (70 - 82)  BP: 110/67 (09-08-21 @ 10:42) (110/67 - 143/71)  RR: 18 (09-08-21 @ 10:42) (18 - 18)  SpO2: 97% (09-08-21 @ 10:42) (95% - 98%)    PHYSICAL EXAM:  Neuro: Awake, responsive  CV: S1 S2 RRR + SM  Lungs: diminished to bases   GI: Soft, BS +, ND, NT  Extremities: N+ LE edema    TELEMETRY: Paced, few PVCs    RADIOLOGY: < from: Xray Chest 1 View-PORTABLE IMMEDIATE (Xray Chest 1 View-PORTABLE IMMEDIATE .) (09.04.21 @ 07:13) >  Markedly increased CHF.    < end of copied text >    DIAGNOSTIC TESTING:    [x ] Echocardiogram: < from: TTE Echo Complete w/o Contrast w/ Doppler (07.13.21 @ 10:39) >  Left Ventricle: The left ventricular internal cavity size is mildly increased.  Global LV systolic function was mildly decreased. Left ventricular ejection fraction, by visual estimation, is 40 to 45%. The left ventricular diastolic function could not be assessed in this study.      LV Wall Scoring:  The mid and apical anterior wall, mid and apical anterior septum, mid and  apical inferior septum, and mid anterolateral segment are hypokinetic.    Right Ventricle: The right ventricular size is mildly enlarged. RV systolic function is mildly reduced.  Left Atrium: Normal left atrial size.  Right Atrium: Normal right atrial size.  Pericardium: There is no evidence of pericardial effusion.  Mitral Valve: Mild thickening and calcification of the anterior and posterior mitral valve leaflets. Mitral leaflet mobility is normal. There is mild mitral annular calcification. Trace mitral valve regurgitation is seen.  Tricuspid Valve: The tricuspid valve is normal in structure. Mild tricuspid regurgitation is visualized.  Aortic Valve: The aortic valve is trileaflet. Moderate to severe aortic stenosis is present. The peak aortic velocity was obtained from the apical view. Moderate aortic valve regurgitation is seen.  Pulmonic Valve: The pulmonic valve was not well visualized. Mild pulmonic valve regurgitation.  Aorta: Aortic root measured at Sinus of Valsalva is normal. There is mild aortic root calcification.  Venous: IVC not well-visualized.  Additional Comments: A pacer wire is visualized in the right atrium and right ventricle.      Summary:   1. Left ventricular ejection fraction,by visual estimation, is 40 to 45%.   2. Technically difficult study.   3. Mildly decreased global left ventricular systolic function.   4. Mid and apical anterior wall, mid and apical anterior septum, mid and apical inferior septum, and mid anterolateral segment are abnormal as described above.   5. Mildly increased left ventricular internal cavity size.   6. The left ventricular diastolic function could not be assessed in this study.   7. There is mild concentric left ventricular hypertrophy.  8. Mildly enlarged right ventricle.   9. Mildly reduced RV systolic function.  10. Normal left atrial size.  11. Normal right atrial size.  12. There is no evidence of pericardial effusion.  13. Mild mitral annular calcification.  14. Mild thickeningand calcification of the anterior and posterior mitral valve leaflets.  15. Trace mitral valve regurgitation.  16. Mild tricuspid regurgitation.  17. Moderate aortic regurgitation.  18. Moderate to severe aortic valve stenosis.  19. Mild pulmonic valve regurgitation.  20. C/w the study of 3-14-21, findings are similar.  21. There is mild aortic root calcification.    < end of copied text >    [x ] Stress Test:  < from: NM Pharm Stress Test, Dual Isotope (03.17.21 @ 12:40) >    1. There was scintigraphic evidence for a myocardial infarct in the RCA territory with no significant ischemia.    2. There is severely abnormal left ventricular contractility, globally diminished calculated ejection fraction and no wall motion abnormlaities. Overall post stress ejection fraction was 21%    < end of copied text >    LABS:	 	    08 Sep 2021 07:26    142    |  108    |  46     ----------------------------<  79     3.6     |  30     |  1.73   07 Sep 2021 10:30    144    |  108    |  47     ----------------------------<  169    4.4     |  34     |  1.87     Ca    9.1        08 Sep 2021 07:26  Mg     2.1       07 Sep 2021 10:30           Patient is a 75y old  Male who presents with a chief complaint of sob (08 Sep 2021 13:04)    PAST MEDICAL & SURGICAL HISTORY:  HTN (hypertension)    DM (diabetes mellitus), type 2    High cholesterol    Afib    CHF (congestive heart failure)    Pneumonia    CAD    COPD (chronic obstructive pulmonary disease)    S/P CABG x 3    S/P cardiac pacemaker procedure  defibrilator 2012    S/P hernia repair  hernia [id number]    INTERVAL HISTORY: Resting in chair, in no acute distress   	  MEDICATIONS:  MEDICATIONS  (STANDING):  ALBUTerol    90 MICROgram(s) HFA Inhaler 2 Puff(s) Inhalation every 8 hours  aspirin enteric coated 81 milliGRAM(s) Oral daily  budesonide 160 MICROgram(s)/formoterol 4.5 MICROgram(s) Inhaler 2 Puff(s) Inhalation two times a day  furosemide   Injectable 40 milliGRAM(s) IV Push daily  hydrALAZINE 10 milliGRAM(s) Oral three times a day  insulin glargine Injectable (LANTUS) 40 Unit(s) SubCutaneous at bedtime  insulin lispro (ADMELOG) corrective regimen sliding scale   SubCutaneous three times a day before meals  insulin lispro Injectable (ADMELOG) 6 Unit(s) SubCutaneous three times a day before meals  isosorbide   mononitrate ER Tablet (IMDUR) 60 milliGRAM(s) Oral daily  metoprolol succinate ER 50 milliGRAM(s) Oral daily  predniSONE   Tablet 20 milliGRAM(s) Oral daily  rivaroxaban 15 milliGRAM(s) Oral with dinner  simvastatin 40 milliGRAM(s) Oral at bedtime  tamsulosin 0.4 milliGRAM(s) Oral at bedtime    Vitals:  T(F): 98.4 (09-08-21 @ 10:42), Max: 98.4 (09-08-21 @ 10:42)  HR: 70 (09-08-21 @ 10:42) (70 - 82)  BP: 110/67 (09-08-21 @ 10:42) (110/67 - 143/71)  RR: 18 (09-08-21 @ 10:42) (18 - 18)  SpO2: 97% (09-08-21 @ 10:42) (95% - 98%)    PHYSICAL EXAM:  Neuro: Awake, responsive  CV: S1 S2 RRR + SM  Lungs: diminished to bases   GI: Soft, BS +, ND, NT  Extremities: N+ LE edema    TELEMETRY: Paced, few PVCs    RADIOLOGY: < from: Xray Chest 1 View-PORTABLE IMMEDIATE (Xray Chest 1 View-PORTABLE IMMEDIATE .) (09.04.21 @ 07:13) >  Markedly increased CHF.    < end of copied text >    DIAGNOSTIC TESTING:    [x ] Echocardiogram: < from: TTE Echo Complete w/o Contrast w/ Doppler (07.13.21 @ 10:39) >  Left Ventricle: The left ventricular internal cavity size is mildly increased.  Global LV systolic function was mildly decreased. Left ventricular ejection fraction, by visual estimation, is 40 to 45%. The left ventricular diastolic function could not be assessed in this study.      LV Wall Scoring:  The mid and apical anterior wall, mid and apical anterior septum, mid and  apical inferior septum, and mid anterolateral segment are hypokinetic.    Right Ventricle: The right ventricular size is mildly enlarged. RV systolic function is mildly reduced.  Left Atrium: Normal left atrial size.  Right Atrium: Normal right atrial size.  Pericardium: There is no evidence of pericardial effusion.  Mitral Valve: Mild thickening and calcification of the anterior and posterior mitral valve leaflets. Mitral leaflet mobility is normal. There is mild mitral annular calcification. Trace mitral valve regurgitation is seen.  Tricuspid Valve: The tricuspid valve is normal in structure. Mild tricuspid regurgitation is visualized.  Aortic Valve: The aortic valve is trileaflet. Moderate to severe aortic stenosis is present. The peak aortic velocity was obtained from the apical view. Moderate aortic valve regurgitation is seen.  Pulmonic Valve: The pulmonic valve was not well visualized. Mild pulmonic valve regurgitation.  Aorta: Aortic root measured at Sinus of Valsalva is normal. There is mild aortic root calcification.  Venous: IVC not well-visualized.  Additional Comments: A pacer wire is visualized in the right atrium and right ventricle.      Summary:   1. Left ventricular ejection fraction,by visual estimation, is 40 to 45%.   2. Technically difficult study.   3. Mildly decreased global left ventricular systolic function.   4. Mid and apical anterior wall, mid and apical anterior septum, mid and apical inferior septum, and mid anterolateral segment are abnormal as described above.   5. Mildly increased left ventricular internal cavity size.   6. The left ventricular diastolic function could not be assessed in this study.   7. There is mild concentric left ventricular hypertrophy.  8. Mildly enlarged right ventricle.   9. Mildly reduced RV systolic function.  10. Normal left atrial size.  11. Normal right atrial size.  12. There is no evidence of pericardial effusion.  13. Mild mitral annular calcification.  14. Mild thickeningand calcification of the anterior and posterior mitral valve leaflets.  15. Trace mitral valve regurgitation.  16. Mild tricuspid regurgitation.  17. Moderate aortic regurgitation.  18. Moderate to severe aortic valve stenosis.  19. Mild pulmonic valve regurgitation.  20. C/w the study of 3-14-21, findings are similar.  21. There is mild aortic root calcification.    < end of copied text >    [x ] Stress Test:  < from: NM Pharm Stress Test, Dual Isotope (03.17.21 @ 12:40) >    1. There was scintigraphic evidence for a myocardial infarct in the RCA territory with no significant ischemia.    2. There is severely abnormal left ventricular contractility, globally diminished calculated ejection fraction and no wall motion abnormlaities. Overall post stress ejection fraction was 21%    < end of copied text >    LABS:	 	    08 Sep 2021 07:26    142    |  108    |  46     ----------------------------<  79     3.6     |  30     |  1.73   07 Sep 2021 10:30    144    |  108    |  47     ----------------------------<  169    4.4     |  34     |  1.87     Ca    9.1        08 Sep 2021 07:26  Mg     2.1       07 Sep 2021 10:30

## 2021-09-09 LAB
ANION GAP SERPL CALC-SCNC: 4 MMOL/L — LOW (ref 5–17)
BUN SERPL-MCNC: 47 MG/DL — HIGH (ref 7–23)
CALCIUM SERPL-MCNC: 8.5 MG/DL — SIGNIFICANT CHANGE UP (ref 8.5–10.1)
CHLORIDE SERPL-SCNC: 111 MMOL/L — HIGH (ref 96–108)
CO2 SERPL-SCNC: 31 MMOL/L — SIGNIFICANT CHANGE UP (ref 22–31)
CREAT SERPL-MCNC: 1.51 MG/DL — HIGH (ref 0.5–1.3)
CULTURE RESULTS: SIGNIFICANT CHANGE UP
CULTURE RESULTS: SIGNIFICANT CHANGE UP
GLUCOSE BLDC GLUCOMTR-MCNC: 252 MG/DL — HIGH (ref 70–99)
GLUCOSE BLDC GLUCOMTR-MCNC: 268 MG/DL — HIGH (ref 70–99)
GLUCOSE BLDC GLUCOMTR-MCNC: 274 MG/DL — HIGH (ref 70–99)
GLUCOSE BLDC GLUCOMTR-MCNC: 350 MG/DL — HIGH (ref 70–99)
GLUCOSE BLDC GLUCOMTR-MCNC: 96 MG/DL — SIGNIFICANT CHANGE UP (ref 70–99)
GLUCOSE SERPL-MCNC: 83 MG/DL — SIGNIFICANT CHANGE UP (ref 70–99)
HCT VFR BLD CALC: 38.2 % — LOW (ref 39–50)
HGB BLD-MCNC: 12.2 G/DL — LOW (ref 13–17)
MCHC RBC-ENTMCNC: 29.7 PG — SIGNIFICANT CHANGE UP (ref 27–34)
MCHC RBC-ENTMCNC: 31.9 GM/DL — LOW (ref 32–36)
MCV RBC AUTO: 92.9 FL — SIGNIFICANT CHANGE UP (ref 80–100)
NRBC # BLD: 0 /100 WBCS — SIGNIFICANT CHANGE UP (ref 0–0)
PLATELET # BLD AUTO: 137 K/UL — LOW (ref 150–400)
POTASSIUM SERPL-MCNC: 3.7 MMOL/L — SIGNIFICANT CHANGE UP (ref 3.5–5.3)
POTASSIUM SERPL-SCNC: 3.7 MMOL/L — SIGNIFICANT CHANGE UP (ref 3.5–5.3)
RBC # BLD: 4.11 M/UL — LOW (ref 4.2–5.8)
RBC # FLD: 17.6 % — HIGH (ref 10.3–14.5)
SODIUM SERPL-SCNC: 146 MMOL/L — HIGH (ref 135–145)
SPECIMEN SOURCE: SIGNIFICANT CHANGE UP
SPECIMEN SOURCE: SIGNIFICANT CHANGE UP
WBC # BLD: 10.73 K/UL — HIGH (ref 3.8–10.5)
WBC # FLD AUTO: 10.73 K/UL — HIGH (ref 3.8–10.5)

## 2021-09-09 PROCEDURE — 99232 SBSQ HOSP IP/OBS MODERATE 35: CPT

## 2021-09-09 PROCEDURE — 99233 SBSQ HOSP IP/OBS HIGH 50: CPT

## 2021-09-09 RX ORDER — FUROSEMIDE 40 MG
40 TABLET ORAL
Refills: 0 | Status: DISCONTINUED | OUTPATIENT
Start: 2021-09-10 | End: 2021-09-10

## 2021-09-09 RX ORDER — INSULIN LISPRO 100/ML
VIAL (ML) SUBCUTANEOUS AT BEDTIME
Refills: 0 | Status: DISCONTINUED | OUTPATIENT
Start: 2021-09-09 | End: 2021-09-10

## 2021-09-09 RX ORDER — FUROSEMIDE 40 MG
40 TABLET ORAL ONCE
Refills: 0 | Status: COMPLETED | OUTPATIENT
Start: 2021-09-09 | End: 2021-09-09

## 2021-09-09 RX ORDER — INSULIN LISPRO 100/ML
7 VIAL (ML) SUBCUTANEOUS
Refills: 0 | Status: DISCONTINUED | OUTPATIENT
Start: 2021-09-09 | End: 2021-09-10

## 2021-09-09 RX ORDER — INSULIN GLARGINE 100 [IU]/ML
32 INJECTION, SOLUTION SUBCUTANEOUS AT BEDTIME
Refills: 0 | Status: DISCONTINUED | OUTPATIENT
Start: 2021-09-09 | End: 2021-09-10

## 2021-09-09 RX ORDER — INSULIN LISPRO 100/ML
VIAL (ML) SUBCUTANEOUS
Refills: 0 | Status: DISCONTINUED | OUTPATIENT
Start: 2021-09-09 | End: 2021-09-10

## 2021-09-09 RX ADMIN — ALBUTEROL 2 PUFF(S): 90 AEROSOL, METERED ORAL at 22:04

## 2021-09-09 RX ADMIN — Medication 6 UNIT(S): at 08:47

## 2021-09-09 RX ADMIN — ALBUTEROL 2 PUFF(S): 90 AEROSOL, METERED ORAL at 05:12

## 2021-09-09 RX ADMIN — Medication 10 MILLIGRAM(S): at 22:04

## 2021-09-09 RX ADMIN — Medication 20 MILLIGRAM(S): at 05:11

## 2021-09-09 RX ADMIN — SIMVASTATIN 40 MILLIGRAM(S): 20 TABLET, FILM COATED ORAL at 22:05

## 2021-09-09 RX ADMIN — Medication 40 MILLIGRAM(S): at 05:11

## 2021-09-09 RX ADMIN — Medication 3: at 11:24

## 2021-09-09 RX ADMIN — Medication 10 MILLIGRAM(S): at 14:30

## 2021-09-09 RX ADMIN — BUDESONIDE AND FORMOTEROL FUMARATE DIHYDRATE 2 PUFF(S): 160; 4.5 AEROSOL RESPIRATORY (INHALATION) at 05:12

## 2021-09-09 RX ADMIN — Medication 40 MILLIGRAM(S): at 18:36

## 2021-09-09 RX ADMIN — RIVAROXABAN 15 MILLIGRAM(S): KIT at 18:37

## 2021-09-09 RX ADMIN — Medication 6 UNIT(S): at 11:24

## 2021-09-09 RX ADMIN — ISOSORBIDE MONONITRATE 60 MILLIGRAM(S): 60 TABLET, EXTENDED RELEASE ORAL at 11:25

## 2021-09-09 RX ADMIN — BUDESONIDE AND FORMOTEROL FUMARATE DIHYDRATE 2 PUFF(S): 160; 4.5 AEROSOL RESPIRATORY (INHALATION) at 18:37

## 2021-09-09 RX ADMIN — INSULIN GLARGINE 32 UNIT(S): 100 INJECTION, SOLUTION SUBCUTANEOUS at 22:04

## 2021-09-09 RX ADMIN — Medication 7 UNIT(S): at 18:36

## 2021-09-09 RX ADMIN — ALBUTEROL 2 PUFF(S): 90 AEROSOL, METERED ORAL at 14:30

## 2021-09-09 RX ADMIN — Medication 8: at 18:35

## 2021-09-09 RX ADMIN — TAMSULOSIN HYDROCHLORIDE 0.4 MILLIGRAM(S): 0.4 CAPSULE ORAL at 22:04

## 2021-09-09 RX ADMIN — Medication 50 MILLIGRAM(S): at 05:11

## 2021-09-09 RX ADMIN — Medication 81 MILLIGRAM(S): at 11:25

## 2021-09-09 RX ADMIN — Medication 10 MILLIGRAM(S): at 05:11

## 2021-09-09 NOTE — PROGRESS NOTE ADULT - SUBJECTIVE AND OBJECTIVE BOX
INTERVAL HPI:   74yo, BM with HTN, HLD, DM, CAD with CABG+ stents, Diastolic CHF, S/P AICD, Chronic A Fib( per Cholo Enriquez note on July 11th 2021), COPD (on home o2 2L), Obesity, EMBER on CPAP( reports compliance),  Gout.  Smoked close to 40 years, quit 20 years ago.  Brought in for worsening SOB over several days, when asked for trigger , says was drinking too much water+ soda as weather was hot and humid.  EMS reported SPO2 of 76% on room air and BP of 190/100.  Placed on BIPAP by EMS and givem NTG spray x5.  SPO2 90% upon arrival to ED.  Denies fever, chills, new cough or sputum production.  Admitted with acute on chronic hypoxic hypercarbic Respiratory failure due to decompensated CHF.    OVERNIGHT EVENTS:  Awake, comfortable, feels better.    Vital Signs Last 24 Hrs  T(C): 36.7 (09 Sep 2021 10:59), Max: 36.7 (09 Sep 2021 10:59)  T(F): 98 (09 Sep 2021 10:59), Max: 98 (09 Sep 2021 10:59)  HR: 71 (09 Sep 2021 10:59) (70 - 73)  BP: 115/58 (09 Sep 2021 10:59) (115/58 - 163/79)  BP(mean): --  RR: 18 (09 Sep 2021 10:59) (18 - 18)  SpO2: 97% (09 Sep 2021 10:59) (96% - 100%)    PHYSICAL EXAM:  GEN:         Awake, responsive and comfortable.  HEENT:    Normal.    RESP:       no distress  CVS:          Regular rate and rhythm.     MEDICATIONS  (STANDING):  ALBUTerol    90 MICROgram(s) HFA Inhaler 2 Puff(s) Inhalation every 8 hours  aspirin enteric coated 81 milliGRAM(s) Oral daily  budesonide 160 MICROgram(s)/formoterol 4.5 MICROgram(s) Inhaler 2 Puff(s) Inhalation two times a day  dextrose 40% Gel 15 Gram(s) Oral once  dextrose 5%. 1000 milliLiter(s) (50 mL/Hr) IV Continuous <Continuous>  dextrose 5%. 1000 milliLiter(s) (100 mL/Hr) IV Continuous <Continuous>  dextrose 50% Injectable 25 Gram(s) IV Push once  dextrose 50% Injectable 12.5 Gram(s) IV Push once  dextrose 50% Injectable 25 Gram(s) IV Push once  furosemide   Injectable 40 milliGRAM(s) IV Push once  glucagon  Injectable 1 milliGRAM(s) IntraMuscular once  hydrALAZINE 10 milliGRAM(s) Oral three times a day  insulin glargine Injectable (LANTUS) 32 Unit(s) SubCutaneous at bedtime  insulin lispro (ADMELOG) corrective regimen sliding scale.   SubCutaneous three times a day before meals  insulin lispro (ADMELOG) corrective regimen sliding scale.   SubCutaneous at bedtime  insulin lispro Injectable (ADMELOG) 7 Unit(s) SubCutaneous three times a day before meals  isosorbide   mononitrate ER Tablet (IMDUR) 60 milliGRAM(s) Oral daily  metoprolol succinate ER 50 milliGRAM(s) Oral daily  predniSONE   Tablet 20 milliGRAM(s) Oral daily  rivaroxaban 15 milliGRAM(s) Oral with dinner  simvastatin 40 milliGRAM(s) Oral at bedtime  tamsulosin 0.4 milliGRAM(s) Oral at bedtime    LABS:                        12.2   10.73 )-----------( 137      ( 09 Sep 2021 07:29 )             38.2     09-09    146<H>  |  111<H>  |  47<H>  ----------------------------<  83  3.7   |  31  |  1.51<H>    Ca    8.5      09 Sep 2021 07:29    09-04 @ 06:58  pH: 7.37  pCO2: 47  pO2: 313  SaO2: 100    ASSESSMENT AND PLAN:  ·	Acute on chronic hypoxic Respiratory failure.  ·	Acute on chronic systolic + diastolic CHF.  ·	Aortic Regurgitation+ Stenosis.  ·	Acute COPD exacerbation.  ·	Leukocytosis.  ·	Renal Insuffiencey.  ·	CAD with CABG+ stents.  ·	S/P AICD.  ·	Chronic A Fib  ·	Obesity.  ·	EMBER.  ·	HTN.  ·	HLD.  ·	DM.  ·	Gout.    Pulmonary status improved and close to base line.  Continue diuretics., along with fluid restriction.  On Symbicort, Xarelto and as needed bronchodilators.  continue O2 with nocturnal BIPAP.  Wants to follow with his Cardiologist.

## 2021-09-09 NOTE — PROGRESS NOTE ADULT - ASSESSMENT
76yo male with known hx HTN, HLD, DM, COPD (on home o2 2L), chronic systolic CHF with LVEF 40%, Afib, CAD s/p 4 vessel CABG with AICD for ischemic cardiomyopathy.  Admitted with worsening respiratory distress and LE edema over the last few days.   EMS found him in resp failure and hypertensive 200/100s with rales in bilateral lung fields. SPO2 70% RA  Pt placed on CPAP, given NTG spray x 5, Lasix   SPO2 90% on ED arrival.  creat baseline 2s  Med and diet compliant as per pt., Admits to increased fluid intake  BNP 3900  trop 0.2    Imp:  Acute on Chronic Systolic/Diastolic Heart Failure  Hypoxemic Hypercarbic Resp Failure  Hypertensive Urgency  Aortic Stenosis  CAD hx  EF 40-45% mild TR, CO, mod AR, mod - sev AS    -monitor on tele  -cont diuresis with IV Lasix for today, creat 1.7 -> 2.1 ->1.5, will switch it to 40 po bid starting in am (home dose)  -monitor lytes and creat; baseline DENISHA   -cont home asa/metoprolol/statin/xarelto  -cont hydralazine/imdur  -Holding ACE/ARB/ARNI  2/2 renal insufficiency   -cont nebs, steroids, and O2 for COPD, pulm following  -activity as tolerated   -CHF education, dietician consult   -echo with concern for severe AS; recommend further eval with MARCIA, pt prefers to be discharged home and follow up with his cardiologist Dr. Jewel Stubbs as outpatient  Dr. Soriano

## 2021-09-09 NOTE — PHARMACOTHERAPY INTERVENTION NOTE - COMMENTS
Recommended lantus adjustment to 32 units (20% decrease) since patient with two morning glucose levels below 100.
Recommended premeal admelog adjustment for hyperglycemia.
Recommended sliding scale adjustment to moderate dose to provide more coverage for hyperglycemia.
Recommended initiating bedtime sliding scale to provide coverage for bedtime hyperglycemia.

## 2021-09-09 NOTE — PROGRESS NOTE ADULT - SUBJECTIVE AND OBJECTIVE BOX
INTERVAL HPI/OVERNIGHT EVENTS: Pt seen and examined at bedside. Denies any complaints. No events overnight.    75y  Vital Signs Last 24 Hrs  T(C): 36.7 (09 Sep 2021 10:59), Max: 36.7 (09 Sep 2021 10:59)  T(F): 98 (09 Sep 2021 10:59), Max: 98 (09 Sep 2021 10:59)  HR: 71 (09 Sep 2021 10:59) (70 - 73)  BP: 115/58 (09 Sep 2021 10:59) (115/58 - 163/79)  BP(mean): --  RR: 18 (09 Sep 2021 10:59) (18 - 18)  SpO2: 97% (09 Sep 2021 10:59) (96% - 100%)  I&O's Summary    MEDICATIONS  (STANDING):  ALBUTerol    90 MICROgram(s) HFA Inhaler 2 Puff(s) Inhalation every 8 hours  aspirin enteric coated 81 milliGRAM(s) Oral daily  budesonide 160 MICROgram(s)/formoterol 4.5 MICROgram(s) Inhaler 2 Puff(s) Inhalation two times a day  dextrose 40% Gel 15 Gram(s) Oral once  dextrose 5%. 1000 milliLiter(s) (50 mL/Hr) IV Continuous <Continuous>  dextrose 5%. 1000 milliLiter(s) (100 mL/Hr) IV Continuous <Continuous>  dextrose 50% Injectable 25 Gram(s) IV Push once  dextrose 50% Injectable 12.5 Gram(s) IV Push once  dextrose 50% Injectable 25 Gram(s) IV Push once  furosemide   Injectable 40 milliGRAM(s) IV Push once  glucagon  Injectable 1 milliGRAM(s) IntraMuscular once  hydrALAZINE 10 milliGRAM(s) Oral three times a day  insulin glargine Injectable (LANTUS) 32 Unit(s) SubCutaneous at bedtime  insulin lispro (ADMELOG) corrective regimen sliding scale.   SubCutaneous three times a day before meals  insulin lispro (ADMELOG) corrective regimen sliding scale.   SubCutaneous at bedtime  insulin lispro Injectable (ADMELOG) 7 Unit(s) SubCutaneous three times a day before meals  isosorbide   mononitrate ER Tablet (IMDUR) 60 milliGRAM(s) Oral daily  metoprolol succinate ER 50 milliGRAM(s) Oral daily  predniSONE   Tablet 20 milliGRAM(s) Oral daily  rivaroxaban 15 milliGRAM(s) Oral with dinner  simvastatin 40 milliGRAM(s) Oral at bedtime  tamsulosin 0.4 milliGRAM(s) Oral at bedtime    MEDICATIONS  (PRN):    LABS:    trop                        12.2   10.73 )-----------( 137      ( 09 Sep 2021 07:29 )             38.2     09-09    146<H>  |  111<H>  |  47<H>  ----------------------------<  83  3.7   |  31  |  1.51<H>    Ca    8.5      09 Sep 2021 07:29          CAPILLARY BLOOD GLUCOSE      POCT Blood Glucose.: 252 mg/dL (09 Sep 2021 11:21)  POCT Blood Glucose.: 96 mg/dL (09 Sep 2021 08:44)  POCT Blood Glucose.: 267 mg/dL (08 Sep 2021 21:54)  POCT Blood Glucose.: 270 mg/dL (08 Sep 2021 16:48)          REVIEW OF SYSTEMS:  CONSTITUTIONAL: No fever, weight loss, or fatigue  RESPIRATORY: No cough, wheezing, chills or hemoptysis; No shortness of breath  CARDIOVASCULAR: No chest pain, palpitations, dizziness, or leg swelling  GASTROINTESTINAL: No abdominal or epigastric pain. No nausea, vomiting, or hematemesis; No diarrhea or constipation. No melena or hematochezia.  GENITOURINARY: No dysuria, frequency, hematuria, or incontinence  NEUROLOGICAL: No headaches, memory loss, loss of strength, numbness, or tremors  MUSCULOSKELETAL: No joint pain or swelling; No muscle, back, or extremity pain      RADIOLOGY & ADDITIONAL TESTS:    Imaging Personally Reviewed:  [ ] YES  [ ] NO    Consultant(s) Notes Reviewed:  [x ] YES  [ ] NO    PHYSICAL EXAM:  GENERAL: NAD  NERVOUS SYSTEM:  Alert & Oriented X3, moves all extremities.  CHEST/LUNG: Diminished BS b/l bases.  HEART: Regular rate and rhythm; No murmurs, rubs, or gallops  ABDOMEN: Soft, Nontender, Nondistended; Bowel sounds present  EXTREMITIES:  2+ Peripheral Pulses, + edema.      A & P:        Care Discussed with Consultants/Other Providers [ x] YES  [ ] NO

## 2021-09-09 NOTE — PROGRESS NOTE ADULT - SUBJECTIVE AND OBJECTIVE BOX
Patient is a 75y old  Male who presents with a chief complaint of sob (08 Sep 2021 18:28)    PAST MEDICAL & SURGICAL HISTORY:  HTN (hypertension)    DM (diabetes mellitus), type 2    High cholesterol    CHF (congestive heart failure)    Pneumonia    COPD (chronic obstructive pulmonary disease)    S/P CABG x 3  cabg3  in 2003    S/P defibrilator 2012    S/P cardiac cath    S/P hernia repair  hernia [id number]    INTERVAL HISTORY: in no acute distress, feeling better   	  MEDICATIONS:  MEDICATIONS  (STANDING):  ALBUTerol    90 MICROgram(s) HFA Inhaler 2 Puff(s) Inhalation every 8 hours  aspirin enteric coated 81 milliGRAM(s) Oral daily  budesonide 160 MICROgram(s)/formoterol 4.5 MICROgram(s) Inhaler 2 Puff(s) Inhalation two times a day  furosemide   Injectable 40 milliGRAM(s) IV Push daily  hydrALAZINE 10 milliGRAM(s) Oral three times a day  insulin glargine Injectable (LANTUS) 40 Unit(s) SubCutaneous at bedtime  insulin lispro (ADMELOG) corrective regimen sliding scale   SubCutaneous three times a day before meals  insulin lispro Injectable (ADMELOG) 6 Unit(s) SubCutaneous three times a day before meals  isosorbide   mononitrate ER Tablet (IMDUR) 60 milliGRAM(s) Oral daily  metoprolol succinate ER 50 milliGRAM(s) Oral daily  predniSONE   Tablet 20 milliGRAM(s) Oral daily  rivaroxaban 15 milliGRAM(s) Oral with dinner  simvastatin 40 milliGRAM(s) Oral at bedtime  tamsulosin 0.4 milliGRAM(s) Oral at bedtime    Vitals:  T(F): 97.9 (09-09-21 @ 04:47), Max: 97.9 (09-09-21 @ 04:47)  HR: 70 (09-09-21 @ 09:02) (70 - 73)  BP: 117/71 (09-09-21 @ 04:47) (117/71 - 163/79)  RR: 18 (09-09-21 @ 04:47) (18 - 18)  SpO2: 97% (09-09-21 @ 09:02) (96% - 100%)    PHYSICAL EXAM:  Neuro: Awake, responsive  CV: S1 S2 RRR + SM  Lungs: diminished to bases   GI: Soft, BS +, ND, NT  Extremities: + LE edema    TELEMETRY: paced, few PVCs    RADIOLOGY: < from: Xray Chest 1 View- PORTABLE-Urgent (Xray Chest 1 View- PORTABLE-Urgent .) (07.11.21 @ 03:36) >   Mild CHF unchanged. Stable cardiac findings.    < end of copied text >    DIAGNOSTIC TESTING:    [x ] Echocardiogram: < from: TTE Echo Complete w/o Contrast w/ Doppler (07.13.21 @ 10:39) >  Left Ventricle: The left ventricular internal cavity size is mildly increased.  Global LV systolic function was mildly decreased. Left ventricular ejection fraction, by visual estimation, is 40 to 45%. The left ventricular diastolic function could not be assessed in this study.      LV Wall Scoring:  The mid and apical anterior wall, mid and apical anterior septum, mid and  apical inferior septum, and mid anterolateral segment are hypokinetic.    Right Ventricle: The right ventricular size is mildly enlarged. RV systolic function is mildly reduced.  Left Atrium: Normal left atrial size.  Right Atrium: Normal right atrial size.  Pericardium: There is no evidence of pericardial effusion.  Mitral Valve: Mild thickening and calcification of the anterior and posterior mitral valve leaflets. Mitral leaflet mobility is normal. There is mild mitral annular calcification. Trace mitral valve regurgitation is seen.  Tricuspid Valve: The tricuspid valve is normal in structure. Mild tricuspid regurgitation is visualized.  Aortic Valve: The aortic valve is trileaflet. Moderate to severe aortic stenosis is present. The peak aortic velocity was obtained from the apical view. Moderate aortic valve regurgitation is seen.  Pulmonic Valve: The pulmonic valve was not well visualized. Mild pulmonic valve regurgitation.  Aorta: Aortic root measured at Sinus of Valsalva is normal. There is mild aortic root calcification.  Venous: IVC not well-visualized.  Additional Comments: A pacer wire is visualized in the right atrium and right ventricle.      Summary:   1. Left ventricular ejection fraction,by visual estimation, is 40 to 45%.   2. Technically difficult study.   3. Mildly decreased global left ventricular systolic function.   4. Mid and apical anterior wall, mid and apical anterior septum, mid and apical inferior septum, and mid anterolateral segment are abnormal as described above.   5. Mildly increased left ventricular internal cavity size.   6. The left ventricular diastolic function could not be assessed in this study.   7. There is mild concentric left ventricular hypertrophy.  8. Mildly enlarged right ventricle.   9. Mildly reduced RV systolic function.  10. Normal left atrial size.  11. Normal right atrial size.  12. There is no evidence of pericardial effusion.  13. Mild mitral annular calcification.  14. Mild thickeningand calcification of the anterior and posterior mitral valve leaflets.  15. Trace mitral valve regurgitation.  16. Mild tricuspid regurgitation.  17. Moderate aortic regurgitation.  18. Moderate to severe aortic valve stenosis.  19. Mild pulmonic valve regurgitation.  20. C/w the study of 3-14-21, findings are similar.  21. There is mild aortic root calcification.    < end of copied text >    < from: NM Pharm Stress Test, Dual Isotope (03.17.21 @ 12:40) >    1. There was scintigraphic evidence for a myocardial infarct in the RCA territory with no significant ischemia.    2. There is severely abnormal left ventricular contractility, globally diminished calculated ejection fraction and no wall motion abnormalities. Overall post stress ejection fraction was 21%    < end of copied text >    LABS:	 	    09 Sep 2021 07:29    146    |  111    |  47     ----------------------------<  83     3.7     |  31     |  1.51   08 Sep 2021 07:26    142    |  108    |  46     ----------------------------<  79     3.6     |  30     |  1.73   07 Sep 2021 10:30    144    |  108    |  47     ----------------------------<  169    4.4     |  34     |  1.87     Ca    8.5        09 Sep 2021 07:29                        12.2   10.73 )-----------( 137      ( 09 Sep 2021 07:29 )             38.2

## 2021-09-09 NOTE — PROGRESS NOTE ADULT - TIME BILLING
Lab test review, Radiology Review, Vitals review, Consultant review and discussion, Physical examination, IDR, Assessment and plan; Plan discussion with patient and family

## 2021-09-09 NOTE — PROGRESS NOTE ADULT - PROVIDER SPECIALTY LIST ADULT
Cardiology
Hospitalist
Pulmonology
Nephrology
Nephrology
Pulmonology
Cardiology
Hospitalist

## 2021-09-09 NOTE — PHARMACOTHERAPY INTERVENTION NOTE - INTERVENTION TYPE RECOOMEND
Dose Optimization/Non-renal Dose Adjustment
Dose Optimization/Non-renal Dose Adjustment
Therapy Recommended - Drug indicated but not ordered
Dose Optimization/Non-renal Dose Adjustment

## 2021-09-10 ENCOUNTER — TRANSCRIPTION ENCOUNTER (OUTPATIENT)
Age: 76
End: 2021-09-10

## 2021-09-10 VITALS
RESPIRATION RATE: 18 BRPM | SYSTOLIC BLOOD PRESSURE: 139 MMHG | OXYGEN SATURATION: 98 % | DIASTOLIC BLOOD PRESSURE: 76 MMHG | TEMPERATURE: 98 F | HEART RATE: 69 BPM

## 2021-09-10 LAB
ANION GAP SERPL CALC-SCNC: 2 MMOL/L — LOW (ref 5–17)
BUN SERPL-MCNC: 50 MG/DL — HIGH (ref 7–23)
CALCIUM SERPL-MCNC: 8.7 MG/DL — SIGNIFICANT CHANGE UP (ref 8.5–10.1)
CHLORIDE SERPL-SCNC: 109 MMOL/L — HIGH (ref 96–108)
CO2 SERPL-SCNC: 34 MMOL/L — HIGH (ref 22–31)
CREAT SERPL-MCNC: 1.64 MG/DL — HIGH (ref 0.5–1.3)
GLUCOSE BLDC GLUCOMTR-MCNC: 242 MG/DL — HIGH (ref 70–99)
GLUCOSE BLDC GLUCOMTR-MCNC: 96 MG/DL — SIGNIFICANT CHANGE UP (ref 70–99)
GLUCOSE SERPL-MCNC: 91 MG/DL — SIGNIFICANT CHANGE UP (ref 70–99)
POTASSIUM SERPL-MCNC: 4.3 MMOL/L — SIGNIFICANT CHANGE UP (ref 3.5–5.3)
POTASSIUM SERPL-SCNC: 4.3 MMOL/L — SIGNIFICANT CHANGE UP (ref 3.5–5.3)
SODIUM SERPL-SCNC: 145 MMOL/L — SIGNIFICANT CHANGE UP (ref 135–145)

## 2021-09-10 PROCEDURE — 99239 HOSP IP/OBS DSCHRG MGMT >30: CPT

## 2021-09-10 RX ORDER — NATEGLINIDE 60 MG/1
1 TABLET, COATED ORAL
Qty: 0 | Refills: 0 | DISCHARGE

## 2021-09-10 RX ORDER — SITAGLIPTIN 50 MG/1
1 TABLET, FILM COATED ORAL
Qty: 0 | Refills: 0 | DISCHARGE

## 2021-09-10 RX ORDER — FUROSEMIDE 40 MG
1 TABLET ORAL
Qty: 60 | Refills: 0
Start: 2021-09-10 | End: 2021-10-09

## 2021-09-10 RX ORDER — INFLUENZA VIRUS VACCINE 15; 15; 15; 15 UG/.5ML; UG/.5ML; UG/.5ML; UG/.5ML
0.5 SUSPENSION INTRAMUSCULAR ONCE
Refills: 0 | Status: COMPLETED | OUTPATIENT
Start: 2021-09-10 | End: 2021-09-10

## 2021-09-10 RX ORDER — METOPROLOL TARTRATE 50 MG
1 TABLET ORAL
Qty: 0 | Refills: 0 | DISCHARGE
Start: 2021-09-10

## 2021-09-10 RX ADMIN — Medication 4: at 11:07

## 2021-09-10 RX ADMIN — Medication 20 MILLIGRAM(S): at 05:15

## 2021-09-10 RX ADMIN — ALBUTEROL 2 PUFF(S): 90 AEROSOL, METERED ORAL at 14:00

## 2021-09-10 RX ADMIN — BUDESONIDE AND FORMOTEROL FUMARATE DIHYDRATE 2 PUFF(S): 160; 4.5 AEROSOL RESPIRATORY (INHALATION) at 05:16

## 2021-09-10 RX ADMIN — Medication 10 MILLIGRAM(S): at 05:15

## 2021-09-10 RX ADMIN — Medication 50 MILLIGRAM(S): at 05:15

## 2021-09-10 RX ADMIN — Medication 81 MILLIGRAM(S): at 11:08

## 2021-09-10 RX ADMIN — Medication 7 UNIT(S): at 07:33

## 2021-09-10 RX ADMIN — Medication 10 MILLIGRAM(S): at 13:59

## 2021-09-10 RX ADMIN — ISOSORBIDE MONONITRATE 60 MILLIGRAM(S): 60 TABLET, EXTENDED RELEASE ORAL at 11:08

## 2021-09-10 RX ADMIN — INFLUENZA VIRUS VACCINE 0.5 MILLILITER(S): 15; 15; 15; 15 SUSPENSION INTRAMUSCULAR at 16:41

## 2021-09-10 RX ADMIN — Medication 7 UNIT(S): at 11:07

## 2021-09-10 RX ADMIN — Medication 40 MILLIGRAM(S): at 05:15

## 2021-09-10 RX ADMIN — ALBUTEROL 2 PUFF(S): 90 AEROSOL, METERED ORAL at 05:18

## 2021-09-10 NOTE — DISCHARGE NOTE PROVIDER - NSDCCPCAREPLAN_GEN_ALL_CORE_FT
PRINCIPAL DISCHARGE DIAGNOSIS  Diagnosis: Acute exacerbation of CHF (congestive heart failure)  Assessment and Plan of Treatment: Meliza was treated with IV lasix in the hospital, patient feels better, continue to take lasix twice a day. ECHO showed Mod to severe AS, Please follow up with your cardiologist for further management.      SECONDARY DISCHARGE DIAGNOSES  Diagnosis: HTN (hypertension)  Assessment and Plan of Treatment:     Diagnosis: DM (diabetes mellitus)  Assessment and Plan of Treatment:     Diagnosis: Atrial fibrillation  Assessment and Plan of Treatment:     Diagnosis: BPH (benign prostatic hyperplasia)  Assessment and Plan of Treatment:

## 2021-09-10 NOTE — DISCHARGE NOTE PROVIDER - CARE PROVIDER_API CALL
Jewel Stubbs  Cardiologist Known to patient  Phone: (   )    -  Fax: (   )    -  Established Patient  Scheduled Appointment: 09/13/2021    PMD,   Known to patient  Phone: (   )    -  Fax: (   )    -  Established Patient  Follow Up Time: 1 week

## 2021-09-10 NOTE — DISCHARGE NOTE NURSING/CASE MANAGEMENT/SOCIAL WORK - PATIENT PORTAL LINK FT
You can access the FollowMyHealth Patient Portal offered by [facility] by registering at the following website: [email or web address]. By joining [facility]’s FollowMyHealth portal, you will also be able to view your health information using other applications (apps) compatible with our system.

## 2021-09-10 NOTE — DISCHARGE NOTE PROVIDER - NSDCMRMEDTOKEN_GEN_ALL_CORE_FT
aspirin 81 mg oral delayed release tablet: 1 tab(s) orally once a day  budesonide-formoterol 160 mcg-4.5 mcg/inh inhalation aerosol: 2 puff(s) inhaled 2 times a day   hydrALAZINE 10 mg oral tablet: 1 tab(s) orally 3 times a day  ipratropium-albuterol 0.5 mg-2.5 mg/3 mL inhalation solution: 3 milliliter(s) by nebulizer every 6 hours, As Needed -for shortness of breath and/or wheezing   isosorbide dinitrate 10 mg oral tablet: 1 tab(s) orally 3 times a day  Lasix 40 mg oral tablet: 1 tab(s) orally 2 times a day   metoprolol succinate 50 mg oral tablet, extended release: 1 tab(s) orally once a day  rivaroxaban 15 mg oral tablet: 1 tab(s) orally once a day (before a meal)  simvastatin 40 mg oral tablet: 1 tab(s) orally once a day (at bedtime)  tamsulosin 0.4 mg oral capsule: 1 cap(s) orally once a day (at bedtime)  Toujeo SoloStar 300 units/mL subcutaneous solution: 53  subcutaneous once a day (at bedtime)  Trulicity Pen 1.5 mg/0.5 mL subcutaneous solution: once a week  Ventolin HFA 90 mcg/inh inhalation aerosol: 2 puff(s) inhaled every 6 hours, As Needed sob/wheezing

## 2021-09-10 NOTE — DISCHARGE NOTE PROVIDER - PROVIDER TOKENS
FREE:[LAST:[Enoc],FIRST:[Jewel],PHONE:[(   )    -],FAX:[(   )    -],ADDRESS:[Cardiologist Known to patient],SCHEDULEDAPPT:[09/13/2021],ESTABLISHEDPATIENT:[T]],FREE:[LAST:[PMD],PHONE:[(   )    -],FAX:[(   )    -],ADDRESS:[Known to patient],FOLLOWUP:[1 week],ESTABLISHEDPATIENT:[T]]

## 2021-09-10 NOTE — DISCHARGE NOTE PROVIDER - HOSPITAL COURSE
75M  hx DM, HTN, HLD, Obesity, COPD  on home o2L, CHF, CAD s/p stent, s/p quadruple bypass, Afib on xarelto presented to the ER for SOB. Patient was diagnosed with CHF exacerbation and was on IV lasix. Patient was evaluated by cardiologist in the hospital. ECHO showed EF 40- 45%, Moderate to severe AS. Patient refused further management for his AS and wanted to discuss with his cardiologist Dr. Jewel Stubbs as out patient. Patient feels better and is stable to be discharged.

## 2021-09-13 NOTE — ED PROVIDER NOTE - CADM POA URETHRAL CATHETER
No Consent: The rationale for Repairs was explained to the patient and consent was obtained. The risks, benefits and alternatives to therapy were discussed in detail. Specifically, the risks of infection, scarring, bleeding, prolonged wound healing, incomplete removal, allergy to anesthesia, nerve injury and recurrence were addressed. Prior to the procedure, the treatment site was clearly identified and confirmed by the patient. All components of Universal Protocol/PAUSE Rule completed.

## 2021-09-15 DIAGNOSIS — I50.23 ACUTE ON CHRONIC SYSTOLIC (CONGESTIVE) HEART FAILURE: ICD-10-CM

## 2021-09-15 DIAGNOSIS — I25.5 ISCHEMIC CARDIOMYOPATHY: ICD-10-CM

## 2021-09-15 DIAGNOSIS — Z87.891 PERSONAL HISTORY OF NICOTINE DEPENDENCE: ICD-10-CM

## 2021-09-15 DIAGNOSIS — I48.20 CHRONIC ATRIAL FIBRILLATION, UNSPECIFIED: ICD-10-CM

## 2021-09-15 DIAGNOSIS — Z95.0 PRESENCE OF CARDIAC PACEMAKER: ICD-10-CM

## 2021-09-15 DIAGNOSIS — E66.2 MORBID (SEVERE) OBESITY WITH ALVEOLAR HYPOVENTILATION: ICD-10-CM

## 2021-09-15 DIAGNOSIS — J96.22 ACUTE AND CHRONIC RESPIRATORY FAILURE WITH HYPERCAPNIA: ICD-10-CM

## 2021-09-15 DIAGNOSIS — I35.0 NONRHEUMATIC AORTIC (VALVE) STENOSIS: ICD-10-CM

## 2021-09-15 DIAGNOSIS — Z79.82 LONG TERM (CURRENT) USE OF ASPIRIN: ICD-10-CM

## 2021-09-15 DIAGNOSIS — I16.0 HYPERTENSIVE URGENCY: ICD-10-CM

## 2021-09-15 DIAGNOSIS — Z95.1 PRESENCE OF AORTOCORONARY BYPASS GRAFT: ICD-10-CM

## 2021-09-15 DIAGNOSIS — R06.02 SHORTNESS OF BREATH: ICD-10-CM

## 2021-09-15 DIAGNOSIS — J96.21 ACUTE AND CHRONIC RESPIRATORY FAILURE WITH HYPOXIA: ICD-10-CM

## 2021-09-15 DIAGNOSIS — E78.5 HYPERLIPIDEMIA, UNSPECIFIED: ICD-10-CM

## 2021-09-15 DIAGNOSIS — E11.65 TYPE 2 DIABETES MELLITUS WITH HYPERGLYCEMIA: ICD-10-CM

## 2021-09-15 DIAGNOSIS — I13.0 HYPERTENSIVE HEART AND CHRONIC KIDNEY DISEASE WITH HEART FAILURE AND STAGE 1 THROUGH STAGE 4 CHRONIC KIDNEY DISEASE, OR UNSPECIFIED CHRONIC KIDNEY DISEASE: ICD-10-CM

## 2021-09-15 DIAGNOSIS — E11.22 TYPE 2 DIABETES MELLITUS WITH DIABETIC CHRONIC KIDNEY DISEASE: ICD-10-CM

## 2021-09-15 DIAGNOSIS — Z79.01 LONG TERM (CURRENT) USE OF ANTICOAGULANTS: ICD-10-CM

## 2021-09-15 DIAGNOSIS — J44.1 CHRONIC OBSTRUCTIVE PULMONARY DISEASE WITH (ACUTE) EXACERBATION: ICD-10-CM

## 2021-09-15 DIAGNOSIS — I27.20 PULMONARY HYPERTENSION, UNSPECIFIED: ICD-10-CM

## 2021-09-15 DIAGNOSIS — M10.9 GOUT, UNSPECIFIED: ICD-10-CM

## 2021-09-15 DIAGNOSIS — N17.9 ACUTE KIDNEY FAILURE, UNSPECIFIED: ICD-10-CM

## 2021-09-15 DIAGNOSIS — Z79.52 LONG TERM (CURRENT) USE OF SYSTEMIC STEROIDS: ICD-10-CM

## 2021-09-15 DIAGNOSIS — I25.10 ATHEROSCLEROTIC HEART DISEASE OF NATIVE CORONARY ARTERY WITHOUT ANGINA PECTORIS: ICD-10-CM

## 2021-09-15 DIAGNOSIS — N18.4 CHRONIC KIDNEY DISEASE, STAGE 4 (SEVERE): ICD-10-CM

## 2021-10-15 ENCOUNTER — INPATIENT (INPATIENT)
Facility: HOSPITAL | Age: 76
LOS: 4 days | Discharge: HOME | End: 2021-10-20
Attending: INTERNAL MEDICINE | Admitting: INTERNAL MEDICINE
Payer: MEDICARE

## 2021-10-15 VITALS
RESPIRATION RATE: 17 BRPM | OXYGEN SATURATION: 100 % | DIASTOLIC BLOOD PRESSURE: 71 MMHG | HEART RATE: 70 BPM | SYSTOLIC BLOOD PRESSURE: 120 MMHG | TEMPERATURE: 101 F | HEIGHT: 70.5 IN | WEIGHT: 223.11 LBS

## 2021-10-15 DIAGNOSIS — N18.30 CHRONIC KIDNEY DISEASE, STAGE 3 UNSPECIFIED: ICD-10-CM

## 2021-10-15 DIAGNOSIS — Z98.89 OTHER SPECIFIED POSTPROCEDURAL STATES: Chronic | ICD-10-CM

## 2021-10-15 DIAGNOSIS — Z79.899 OTHER LONG TERM (CURRENT) DRUG THERAPY: ICD-10-CM

## 2021-10-15 DIAGNOSIS — E11.22 TYPE 2 DIABETES MELLITUS WITH DIABETIC CHRONIC KIDNEY DISEASE: ICD-10-CM

## 2021-10-15 DIAGNOSIS — R33.9 RETENTION OF URINE, UNSPECIFIED: ICD-10-CM

## 2021-10-15 DIAGNOSIS — Z99.81 DEPENDENCE ON SUPPLEMENTAL OXYGEN: ICD-10-CM

## 2021-10-15 DIAGNOSIS — A41.50 GRAM-NEGATIVE SEPSIS, UNSPECIFIED: ICD-10-CM

## 2021-10-15 DIAGNOSIS — I13.0 HYPERTENSIVE HEART AND CHRONIC KIDNEY DISEASE WITH HEART FAILURE AND STAGE 1 THROUGH STAGE 4 CHRONIC KIDNEY DISEASE, OR UNSPECIFIED CHRONIC KIDNEY DISEASE: ICD-10-CM

## 2021-10-15 DIAGNOSIS — Z79.4 LONG TERM (CURRENT) USE OF INSULIN: ICD-10-CM

## 2021-10-15 DIAGNOSIS — J96.11 CHRONIC RESPIRATORY FAILURE WITH HYPOXIA: ICD-10-CM

## 2021-10-15 DIAGNOSIS — Z95.0 PRESENCE OF CARDIAC PACEMAKER: Chronic | ICD-10-CM

## 2021-10-15 DIAGNOSIS — Z95.1 PRESENCE OF AORTOCORONARY BYPASS GRAFT: Chronic | ICD-10-CM

## 2021-10-15 DIAGNOSIS — I50.22 CHRONIC SYSTOLIC (CONGESTIVE) HEART FAILURE: ICD-10-CM

## 2021-10-15 DIAGNOSIS — N17.9 ACUTE KIDNEY FAILURE, UNSPECIFIED: ICD-10-CM

## 2021-10-15 DIAGNOSIS — Z79.01 LONG TERM (CURRENT) USE OF ANTICOAGULANTS: ICD-10-CM

## 2021-10-15 DIAGNOSIS — J44.9 CHRONIC OBSTRUCTIVE PULMONARY DISEASE, UNSPECIFIED: ICD-10-CM

## 2021-10-15 DIAGNOSIS — E78.5 HYPERLIPIDEMIA, UNSPECIFIED: ICD-10-CM

## 2021-10-15 DIAGNOSIS — N30.00 ACUTE CYSTITIS WITHOUT HEMATURIA: ICD-10-CM

## 2021-10-15 DIAGNOSIS — Z95.810 PRESENCE OF AUTOMATIC (IMPLANTABLE) CARDIAC DEFIBRILLATOR: ICD-10-CM

## 2021-10-15 LAB
ALBUMIN SERPL ELPH-MCNC: 3.1 G/DL — LOW (ref 3.3–5)
ALP SERPL-CCNC: 133 U/L — HIGH (ref 40–120)
ALT FLD-CCNC: 30 U/L — SIGNIFICANT CHANGE UP (ref 12–78)
ANION GAP SERPL CALC-SCNC: 10 MMOL/L — SIGNIFICANT CHANGE UP (ref 5–17)
APPEARANCE UR: ABNORMAL
APTT BLD: 33 SEC — SIGNIFICANT CHANGE UP (ref 27.5–35.5)
AST SERPL-CCNC: 42 U/L — HIGH (ref 15–37)
BASOPHILS # BLD AUTO: 0.03 K/UL — SIGNIFICANT CHANGE UP (ref 0–0.2)
BASOPHILS NFR BLD AUTO: 0.2 % — SIGNIFICANT CHANGE UP (ref 0–2)
BILIRUB SERPL-MCNC: 0.7 MG/DL — SIGNIFICANT CHANGE UP (ref 0.2–1.2)
BILIRUB UR-MCNC: NEGATIVE — SIGNIFICANT CHANGE UP
BUN SERPL-MCNC: 71 MG/DL — HIGH (ref 7–23)
CALCIUM SERPL-MCNC: 9.3 MG/DL — SIGNIFICANT CHANGE UP (ref 8.5–10.1)
CHLORIDE SERPL-SCNC: 91 MMOL/L — LOW (ref 96–108)
CO2 SERPL-SCNC: 32 MMOL/L — HIGH (ref 22–31)
COLOR SPEC: YELLOW — SIGNIFICANT CHANGE UP
CREAT SERPL-MCNC: 2.98 MG/DL — HIGH (ref 0.5–1.3)
DIFF PNL FLD: ABNORMAL
EOSINOPHIL # BLD AUTO: 0 K/UL — SIGNIFICANT CHANGE UP (ref 0–0.5)
EOSINOPHIL NFR BLD AUTO: 0 % — SIGNIFICANT CHANGE UP (ref 0–6)
GLUCOSE SERPL-MCNC: 160 MG/DL — HIGH (ref 70–99)
GLUCOSE UR QL: NEGATIVE MG/DL — SIGNIFICANT CHANGE UP
HCT VFR BLD CALC: 30.1 % — LOW (ref 39–50)
HGB BLD-MCNC: 9.8 G/DL — LOW (ref 13–17)
IMM GRANULOCYTES NFR BLD AUTO: 0.8 % — SIGNIFICANT CHANGE UP (ref 0–1.5)
INR BLD: 2.12 RATIO — HIGH (ref 0.88–1.16)
KETONES UR-MCNC: NEGATIVE — SIGNIFICANT CHANGE UP
LACTATE SERPL-SCNC: 1.7 MMOL/L — SIGNIFICANT CHANGE UP (ref 0.7–2)
LEUKOCYTE ESTERASE UR-ACNC: ABNORMAL
LYMPHOCYTES # BLD AUTO: 0.59 K/UL — LOW (ref 1–3.3)
LYMPHOCYTES # BLD AUTO: 4.2 % — LOW (ref 13–44)
MCHC RBC-ENTMCNC: 31.2 PG — SIGNIFICANT CHANGE UP (ref 27–34)
MCHC RBC-ENTMCNC: 32.6 GM/DL — SIGNIFICANT CHANGE UP (ref 32–36)
MCV RBC AUTO: 95.9 FL — SIGNIFICANT CHANGE UP (ref 80–100)
MONOCYTES # BLD AUTO: 1.25 K/UL — HIGH (ref 0–0.9)
MONOCYTES NFR BLD AUTO: 8.8 % — SIGNIFICANT CHANGE UP (ref 2–14)
NEUTROPHILS # BLD AUTO: 12.15 K/UL — HIGH (ref 1.8–7.4)
NEUTROPHILS NFR BLD AUTO: 86 % — HIGH (ref 43–77)
NITRITE UR-MCNC: NEGATIVE — SIGNIFICANT CHANGE UP
NRBC # BLD: 0 /100 WBCS — SIGNIFICANT CHANGE UP (ref 0–0)
PH UR: 6 — SIGNIFICANT CHANGE UP (ref 5–8)
PLATELET # BLD AUTO: 134 K/UL — LOW (ref 150–400)
POTASSIUM SERPL-MCNC: 4.2 MMOL/L — SIGNIFICANT CHANGE UP (ref 3.5–5.3)
POTASSIUM SERPL-SCNC: 4.2 MMOL/L — SIGNIFICANT CHANGE UP (ref 3.5–5.3)
PROT SERPL-MCNC: 7.8 GM/DL — SIGNIFICANT CHANGE UP (ref 6–8.3)
PROT UR-MCNC: 30 MG/DL
PROTHROM AB SERPL-ACNC: 23.7 SEC — HIGH (ref 10.6–13.6)
RBC # BLD: 3.14 M/UL — LOW (ref 4.2–5.8)
RBC # FLD: 16.7 % — HIGH (ref 10.3–14.5)
SODIUM SERPL-SCNC: 133 MMOL/L — LOW (ref 135–145)
SP GR SPEC: 1.01 — SIGNIFICANT CHANGE UP (ref 1.01–1.02)
UROBILINOGEN FLD QL: NEGATIVE MG/DL — SIGNIFICANT CHANGE UP
WBC # BLD: 14.13 K/UL — HIGH (ref 3.8–10.5)
WBC # FLD AUTO: 14.13 K/UL — HIGH (ref 3.8–10.5)

## 2021-10-15 PROCEDURE — 71045 X-RAY EXAM CHEST 1 VIEW: CPT | Mod: 26

## 2021-10-15 PROCEDURE — 93010 ELECTROCARDIOGRAM REPORT: CPT

## 2021-10-15 PROCEDURE — 99285 EMERGENCY DEPT VISIT HI MDM: CPT

## 2021-10-15 RX ORDER — ACETAMINOPHEN 500 MG
650 TABLET ORAL ONCE
Refills: 0 | Status: COMPLETED | OUTPATIENT
Start: 2021-10-15 | End: 2021-10-15

## 2021-10-15 RX ORDER — VANCOMYCIN HCL 1 G
1000 VIAL (EA) INTRAVENOUS ONCE
Refills: 0 | Status: COMPLETED | OUTPATIENT
Start: 2021-10-15 | End: 2021-10-15

## 2021-10-15 RX ORDER — PIPERACILLIN AND TAZOBACTAM 4; .5 G/20ML; G/20ML
3.38 INJECTION, POWDER, LYOPHILIZED, FOR SOLUTION INTRAVENOUS ONCE
Refills: 0 | Status: COMPLETED | OUTPATIENT
Start: 2021-10-15 | End: 2021-10-15

## 2021-10-15 RX ADMIN — PIPERACILLIN AND TAZOBACTAM 200 GRAM(S): 4; .5 INJECTION, POWDER, LYOPHILIZED, FOR SOLUTION INTRAVENOUS at 23:17

## 2021-10-15 RX ADMIN — PIPERACILLIN AND TAZOBACTAM 3.38 GRAM(S): 4; .5 INJECTION, POWDER, LYOPHILIZED, FOR SOLUTION INTRAVENOUS at 23:54

## 2021-10-15 RX ADMIN — Medication 250 MILLIGRAM(S): at 23:54

## 2021-10-15 RX ADMIN — Medication 650 MILLIGRAM(S): at 23:12

## 2021-10-15 NOTE — ED PROVIDER NOTE - PHYSICAL EXAMINATION
Gen: Alert, Well appearing. NAD    Head: NC, AT, PERRL, normal lids/conjunctiva   ENT: patent oropharynx without erythema/exudate, uvula midline  Neck: supple, no tenderness/meningismus  Pulm: Bilateral clear BS, normal resp effort  CV: RRR, no M/R/G, +dist pulses   Abd: soft, NT/ND, +BS, no guarding/rebound tenderness  Mskel: no edema/erythema/cyanosis   Skin: no rash, no bruising, groin wound: clean, steri strips, no erythema, induration,   Neuro: AAOx3, no sensory/motor deficits, CN 2-12 intact

## 2021-10-15 NOTE — ED PROVIDER NOTE - OBJECTIVE STATEMENT
76yo male with pmh DM, HTN, HLD, Obesity, COPD on home O2L, CHF, CAD s/p stent, s/p CABG x 4 (20 years ago), Afib on xarelto, AS, recent ? TAVR 9 days ago at [place], dc 3 days ago. Pt feeling okay since IA, had some dizziness yesterday and today with fever. denies headache, cough, cp, sob, abd pain, NVD, dysuria, hematuria. Pt did have ro and noted dark urine but it is clearing up.  Cardio Dr. Jewel Stubbs    + fever, No photophobia/eye pain/changes in vision, No ear pain/sore throat, No chest pain/palpitations, no SOB/cough, No abdominal pain, No N/V/D, no dysuria/frequency, No neck/back pain, no rash, no changes in neurological status/function. 76yo male with pmh DM, HTN, HLD, Obesity, COPD on home O2L, CHF, CAD s/p stent, s/p CABG x 4 (20 years ago), Afib on xarelto, h/o AS, recent ? TAVR 9 days ago at [place], dc 3 days ago. Pt feeling okay since MD, had some dizziness yesterday and today with fever. denies headache, cough, cp, sob, abd pain, NVD, dysuria, hematuria. Pt did have ro and noted dark urine but it is clearing up.  Cardio Dr. Jewel Stubbs    + fever, No photophobia/eye pain/changes in vision, No ear pain/sore throat, No chest pain/palpitations, no SOB/cough, No abdominal pain, No N/V/D, no dysuria/frequency, No neck/back pain, no rash, no changes in neurological status/function.

## 2021-10-15 NOTE — ED PROVIDER NOTE - CLINICAL SUMMARY MEDICAL DECISION MAKING FREE TEXT BOX
Pt well appearing with fever and wcc of 14. Could def be cystitis/UTI, however given recent TVAR, need to r/o bacteremia/seeding. gio guadalupe for admission.

## 2021-10-15 NOTE — ED ADULT TRIAGE NOTE - MODE OF ARRIVAL
Patient: Aman Sanchez    Procedure Summary     Date:  07/25/18 Room / Location:  [facility] OR 08 / [facility] OR    Anesthesia Start:  1841 Anesthesia Stop:  1928    Procedure:  CYSTOSCOPY URETEROSCOPY RETROGRADE PYELOGRAM HOLMIUM LASER STENT INSERTION (Left ) Diagnosis:       Calculus of ureter      (Calculus of ureter [N20.1])    Surgeon:  Uday Horne MD Provider:  Darren Shah MD    Anesthesia Type:  general ASA Status:  3          Anesthesia Type: general  Last vitals  BP   129/87 (07/25/18 1547)   Temp   97.7 °F (36.5 °C) (07/25/18 1547)   Pulse   88 (07/25/18 1547)   Resp   20 (07/25/18 1547)     SpO2   93 % (07/25/18 1547)     Post Anesthesia Care and Evaluation    Patient location during evaluation: PACU  Patient participation: complete - patient participated  Level of consciousness: awake and alert  Pain score: 2  Pain management: adequate  Airway patency: patent  Anesthetic complications: No anesthetic complications  PONV Status: none  Cardiovascular status: acceptable  Respiratory status: acceptable  Hydration status: acceptable       [facility]

## 2021-10-16 DIAGNOSIS — N17.9 ACUTE KIDNEY FAILURE, UNSPECIFIED: ICD-10-CM

## 2021-10-16 DIAGNOSIS — I50.22 CHRONIC SYSTOLIC (CONGESTIVE) HEART FAILURE: ICD-10-CM

## 2021-10-16 DIAGNOSIS — E11.9 TYPE 2 DIABETES MELLITUS WITHOUT COMPLICATIONS: ICD-10-CM

## 2021-10-16 DIAGNOSIS — A41.9 SEPSIS, UNSPECIFIED ORGANISM: ICD-10-CM

## 2021-10-16 DIAGNOSIS — J44.9 CHRONIC OBSTRUCTIVE PULMONARY DISEASE, UNSPECIFIED: ICD-10-CM

## 2021-10-16 DIAGNOSIS — I10 ESSENTIAL (PRIMARY) HYPERTENSION: ICD-10-CM

## 2021-10-16 DIAGNOSIS — N30.00 ACUTE CYSTITIS WITHOUT HEMATURIA: ICD-10-CM

## 2021-10-16 LAB
ALBUMIN SERPL ELPH-MCNC: 2.6 G/DL — LOW (ref 3.3–5)
ALP SERPL-CCNC: 124 U/L — HIGH (ref 40–120)
ALT FLD-CCNC: 26 U/L — SIGNIFICANT CHANGE UP (ref 12–78)
ANION GAP SERPL CALC-SCNC: 8 MMOL/L — SIGNIFICANT CHANGE UP (ref 5–17)
AST SERPL-CCNC: 40 U/L — HIGH (ref 15–37)
BACTERIA # UR AUTO: ABNORMAL
BILIRUB SERPL-MCNC: 0.8 MG/DL — SIGNIFICANT CHANGE UP (ref 0.2–1.2)
BUN SERPL-MCNC: 68 MG/DL — HIGH (ref 7–23)
CALCIUM SERPL-MCNC: 9 MG/DL — SIGNIFICANT CHANGE UP (ref 8.5–10.1)
CHLORIDE SERPL-SCNC: 91 MMOL/L — LOW (ref 96–108)
CO2 SERPL-SCNC: 33 MMOL/L — HIGH (ref 22–31)
COMMENT - URINE: SIGNIFICANT CHANGE UP
CREAT SERPL-MCNC: 2.69 MG/DL — HIGH (ref 0.5–1.3)
EPI CELLS # UR: SIGNIFICANT CHANGE UP
FLUAV AG NPH QL: SIGNIFICANT CHANGE UP
FLUBV AG NPH QL: SIGNIFICANT CHANGE UP
GLUCOSE BLDC GLUCOMTR-MCNC: 137 MG/DL — HIGH (ref 70–99)
GLUCOSE BLDC GLUCOMTR-MCNC: 191 MG/DL — HIGH (ref 70–99)
GLUCOSE BLDC GLUCOMTR-MCNC: 199 MG/DL — HIGH (ref 70–99)
GLUCOSE BLDC GLUCOMTR-MCNC: 253 MG/DL — HIGH (ref 70–99)
GLUCOSE SERPL-MCNC: 138 MG/DL — HIGH (ref 70–99)
GRAM STN FLD: SIGNIFICANT CHANGE UP
HCT VFR BLD CALC: 27.9 % — LOW (ref 39–50)
HGB BLD-MCNC: 9.2 G/DL — LOW (ref 13–17)
MCHC RBC-ENTMCNC: 31.4 PG — SIGNIFICANT CHANGE UP (ref 27–34)
MCHC RBC-ENTMCNC: 33 GM/DL — SIGNIFICANT CHANGE UP (ref 32–36)
MCV RBC AUTO: 95.2 FL — SIGNIFICANT CHANGE UP (ref 80–100)
NRBC # BLD: 0 /100 WBCS — SIGNIFICANT CHANGE UP (ref 0–0)
PLATELET # BLD AUTO: 119 K/UL — LOW (ref 150–400)
POTASSIUM SERPL-MCNC: 3.7 MMOL/L — SIGNIFICANT CHANGE UP (ref 3.5–5.3)
POTASSIUM SERPL-SCNC: 3.7 MMOL/L — SIGNIFICANT CHANGE UP (ref 3.5–5.3)
PROT SERPL-MCNC: 7 GM/DL — SIGNIFICANT CHANGE UP (ref 6–8.3)
RBC # BLD: 2.93 M/UL — LOW (ref 4.2–5.8)
RBC # FLD: 16.5 % — HIGH (ref 10.3–14.5)
RBC CASTS # UR COMP ASSIST: ABNORMAL /HPF (ref 0–4)
SARS-COV-2 RNA SPEC QL NAA+PROBE: SIGNIFICANT CHANGE UP
SODIUM SERPL-SCNC: 132 MMOL/L — LOW (ref 135–145)
SPECIMEN SOURCE: SIGNIFICANT CHANGE UP
WBC # BLD: 11.86 K/UL — HIGH (ref 3.8–10.5)
WBC # FLD AUTO: 11.86 K/UL — HIGH (ref 3.8–10.5)
WBC UR QL: ABNORMAL

## 2021-10-16 PROCEDURE — 74176 CT ABD & PELVIS W/O CONTRAST: CPT | Mod: 26,MH

## 2021-10-16 PROCEDURE — 12345: CPT | Mod: NC

## 2021-10-16 PROCEDURE — 99222 1ST HOSP IP/OBS MODERATE 55: CPT

## 2021-10-16 PROCEDURE — 99223 1ST HOSP IP/OBS HIGH 75: CPT

## 2021-10-16 RX ORDER — DEXTROSE 50 % IN WATER 50 %
15 SYRINGE (ML) INTRAVENOUS ONCE
Refills: 0 | Status: DISCONTINUED | OUTPATIENT
Start: 2021-10-16 | End: 2021-10-20

## 2021-10-16 RX ORDER — ASPIRIN/CALCIUM CARB/MAGNESIUM 324 MG
81 TABLET ORAL DAILY
Refills: 0 | Status: DISCONTINUED | OUTPATIENT
Start: 2021-10-16 | End: 2021-10-20

## 2021-10-16 RX ORDER — DEXTROSE 50 % IN WATER 50 %
25 SYRINGE (ML) INTRAVENOUS ONCE
Refills: 0 | Status: DISCONTINUED | OUTPATIENT
Start: 2021-10-16 | End: 2021-10-20

## 2021-10-16 RX ORDER — CEFEPIME 1 G/1
2000 INJECTION, POWDER, FOR SOLUTION INTRAMUSCULAR; INTRAVENOUS EVERY 24 HOURS
Refills: 0 | Status: DISCONTINUED | OUTPATIENT
Start: 2021-10-17 | End: 2021-10-18

## 2021-10-16 RX ORDER — DEXTROSE 50 % IN WATER 50 %
12.5 SYRINGE (ML) INTRAVENOUS ONCE
Refills: 0 | Status: DISCONTINUED | OUTPATIENT
Start: 2021-10-16 | End: 2021-10-20

## 2021-10-16 RX ORDER — FUROSEMIDE 40 MG
40 TABLET ORAL DAILY
Refills: 0 | Status: DISCONTINUED | OUTPATIENT
Start: 2021-10-16 | End: 2021-10-18

## 2021-10-16 RX ORDER — BUDESONIDE AND FORMOTEROL FUMARATE DIHYDRATE 160; 4.5 UG/1; UG/1
2 AEROSOL RESPIRATORY (INHALATION)
Refills: 0 | Status: DISCONTINUED | OUTPATIENT
Start: 2021-10-16 | End: 2021-10-20

## 2021-10-16 RX ORDER — ALBUTEROL 90 UG/1
2 AEROSOL, METERED ORAL EVERY 6 HOURS
Refills: 0 | Status: DISCONTINUED | OUTPATIENT
Start: 2021-10-16 | End: 2021-10-20

## 2021-10-16 RX ORDER — RIVAROXABAN 15 MG-20MG
15 KIT ORAL DAILY
Refills: 0 | Status: DISCONTINUED | OUTPATIENT
Start: 2021-10-16 | End: 2021-10-20

## 2021-10-16 RX ORDER — PIPERACILLIN AND TAZOBACTAM 4; .5 G/20ML; G/20ML
3.38 INJECTION, POWDER, LYOPHILIZED, FOR SOLUTION INTRAVENOUS EVERY 8 HOURS
Refills: 0 | Status: DISCONTINUED | OUTPATIENT
Start: 2021-10-16 | End: 2021-10-16

## 2021-10-16 RX ORDER — INSULIN LISPRO 100/ML
VIAL (ML) SUBCUTANEOUS
Refills: 0 | Status: DISCONTINUED | OUTPATIENT
Start: 2021-10-16 | End: 2021-10-20

## 2021-10-16 RX ORDER — SODIUM CHLORIDE 9 MG/ML
1000 INJECTION, SOLUTION INTRAVENOUS
Refills: 0 | Status: DISCONTINUED | OUTPATIENT
Start: 2021-10-16 | End: 2021-10-20

## 2021-10-16 RX ORDER — GLUCAGON INJECTION, SOLUTION 0.5 MG/.1ML
1 INJECTION, SOLUTION SUBCUTANEOUS ONCE
Refills: 0 | Status: DISCONTINUED | OUTPATIENT
Start: 2021-10-16 | End: 2021-10-20

## 2021-10-16 RX ORDER — SIMVASTATIN 20 MG/1
40 TABLET, FILM COATED ORAL AT BEDTIME
Refills: 0 | Status: DISCONTINUED | OUTPATIENT
Start: 2021-10-16 | End: 2021-10-20

## 2021-10-16 RX ORDER — SODIUM CHLORIDE 9 MG/ML
1000 INJECTION, SOLUTION INTRAVENOUS
Refills: 0 | Status: COMPLETED | OUTPATIENT
Start: 2021-10-16 | End: 2021-10-16

## 2021-10-16 RX ORDER — ACETAMINOPHEN 500 MG
650 TABLET ORAL EVERY 6 HOURS
Refills: 0 | Status: DISCONTINUED | OUTPATIENT
Start: 2021-10-16 | End: 2021-10-20

## 2021-10-16 RX ORDER — IBUPROFEN 200 MG
400 TABLET ORAL ONCE
Refills: 0 | Status: COMPLETED | OUTPATIENT
Start: 2021-10-16 | End: 2021-10-16

## 2021-10-16 RX ORDER — TIOTROPIUM BROMIDE 18 UG/1
1 CAPSULE ORAL; RESPIRATORY (INHALATION) DAILY
Refills: 0 | Status: DISCONTINUED | OUTPATIENT
Start: 2021-10-16 | End: 2021-10-20

## 2021-10-16 RX ADMIN — SIMVASTATIN 40 MILLIGRAM(S): 20 TABLET, FILM COATED ORAL at 22:19

## 2021-10-16 RX ADMIN — PIPERACILLIN AND TAZOBACTAM 25 GRAM(S): 4; .5 INJECTION, POWDER, LYOPHILIZED, FOR SOLUTION INTRAVENOUS at 15:35

## 2021-10-16 RX ADMIN — RIVAROXABAN 15 MILLIGRAM(S): KIT at 11:38

## 2021-10-16 RX ADMIN — Medication 1: at 17:11

## 2021-10-16 RX ADMIN — Medication 650 MILLIGRAM(S): at 06:50

## 2021-10-16 RX ADMIN — Medication 400 MILLIGRAM(S): at 11:04

## 2021-10-16 RX ADMIN — SODIUM CHLORIDE 60 MILLILITER(S): 9 INJECTION, SOLUTION INTRAVENOUS at 04:31

## 2021-10-16 RX ADMIN — Medication 650 MILLIGRAM(S): at 07:50

## 2021-10-16 RX ADMIN — PIPERACILLIN AND TAZOBACTAM 25 GRAM(S): 4; .5 INJECTION, POWDER, LYOPHILIZED, FOR SOLUTION INTRAVENOUS at 22:18

## 2021-10-16 RX ADMIN — Medication 81 MILLIGRAM(S): at 11:06

## 2021-10-16 RX ADMIN — Medication 1: at 11:09

## 2021-10-16 RX ADMIN — Medication 40 MILLIGRAM(S): at 05:09

## 2021-10-16 RX ADMIN — Medication 400 MILLIGRAM(S): at 12:04

## 2021-10-16 RX ADMIN — PIPERACILLIN AND TAZOBACTAM 25 GRAM(S): 4; .5 INJECTION, POWDER, LYOPHILIZED, FOR SOLUTION INTRAVENOUS at 06:50

## 2021-10-16 NOTE — CONSULT NOTE ADULT - ASSESSMENT
75M with a PMH of HFrEF s/p ICD on xarelto, COPD on home O2 (3L), HTN, DM, HLD who presents to the ED for fever.   Patient recently at [facility]  for CHF exacerbation, then had a TAVR performed on 9/30.  Was in the CCU for 8 days afterwards.  Reports no complications from the procedure. Febrile to 101 in ED   labs reveal mild leukocytosis and elevated creatinine.    CXR (I personally reviewed) with vascular congestion and cannot rule out infiltrate   groin site doesnt appear to be infected  UA is positive though unclear if he has urinary symptoms   high concern of bacteremia which could quite serious he has a device and new aortic valve   Zosyn not the best choice in a patient with HFrEF and who just bounced back from a CHF exacerbation as there is a high sodium load therefore have changed antibiotics to cefepime   He received a dose of vancomycin and I have ordered a vancomycin level for the AM    Fever  Leukocytosis   s/p TAVR   DM  DENISHA    Plan:  blood cultures x2   stop zosyn  start cefepime  send vancomycin trough in the AM  MRSA pcr   legionella   f/u blood and urine cultures   trend wbc  trend creatinine   good but not too tight glycemic control, HbA1c, ISS and management per medicine     Gonzalo Guadalupe DO  Infectious Disease Attending  Pager [phone]  After 5pm/weekends please call [phone] for all inquiries and new consults

## 2021-10-16 NOTE — PROGRESS NOTE ADULT - ASSESSMENT
75M with a PMH of HFrEF s/p ICD on xarelto, COPD on home O2 (3L), HTN, DM, HLD who presents to the ED for fever.  S/p recent hospitalization at [facility] for CHF. CT A/p showed Thickening of the urinary bladder walls despite underdistention with infiltration of the perivesicular fat concerning for cystitis. Trace complex fluid in the right paracolic gutter extending up to the right inguinal canal with infiltrative changes in the right inguinal region     # Acute Cystitis - continue Abx.  Recent hospitalization, continue Zosyn.  F/u cultures.  CT findings of ? trace complex fluid extending up R inguinal canal.  No tenderness / findings on exam.  Monitor for now.    # Sepsis - due to above.  Improved  # Urinary retention - check PVR, will consider Bucio if needed.    # DENISHA - Cr improving.  No hydronephrosis on CT.    # Severe COPD - on O2.  no SOB / complaints.  # Chronic Hypoxic Respiratory Failure - pt reports on 2-3L of O2 at home, presently baseline.  # Chronic Systolic CHF - Appears to be euvolemic.  On Lasix.   # Essential hypertension. BP stable.  # Diabetes Type II - monitor fingersticks.  Insulin coverage for hyperglycemia.  # Inpatient DVT Proph - on anticoagulation

## 2021-10-16 NOTE — H&P ADULT - PROBLEM SELECTOR PLAN 1
Started on Zosyn and Vanco in the ED, will continue.  Follow blood cultures - de-escalate antibiotics as needed   ID consult in Am - [place]  Patient found to have UTI but will keep on empiric antibiotics to r/o bacteremia from TAVR

## 2021-10-16 NOTE — H&P ADULT - HISTORY OF PRESENT ILLNESS
Patient is a 75M with a PMH of HFrEF s/p ICD on xarelto, COPD on home O2 (3L), HTN, DM, HLD who presents to the ED for fever.  Patient AAOx3 however is lethargic, poor historian.  Collateral history provided by wife - Judi - at the bedside.  Reports patient became lethargic, had poor PO intake, and had subjective fever and chills today.  Wife also noted change in color and foul odor of patient's urine.  Wife also noted recent hypotension of the patient - spoke to patient's PMD who took patient off the clonidine 0.3 patch.  Patient recently discharged from [facility] after being admitted for ~30 days.  Per wife, patient was admitted for CHF exacerbation, then had a TAVR performed on 9/30.  Was in the CCU for 8 days afterwards.  Reports no complications from the procedure.  Patient has no other complaints.  Febrile to 101 in ED, labs reveal mild leukocytosis and elevated creatinine.  Will admit to med surg.

## 2021-10-16 NOTE — PROGRESS NOTE ADULT - SUBJECTIVE AND OBJECTIVE BOX
Patient: JO VARELA [id number] 76y Male                            Hospitalist Attending Note    Denies complaints.  Fever noted this am.  Denies dysuria.      ____________________PHYSICAL EXAM:  GENERAL:  NAD, alert and oriented to person, place.   HEENT: NCAT  CARDIOVASCULAR:  S1, S2  LUNGS: CTAB  ABDOMEN:  soft, (-) tenderness, mild suprapubic distension, (+) bowel sounds, (-) guarding, (-) rebound (-) rigidity  EXTREMITIES:  no cyanosis / clubbing / edema.   ____________________     VITALS:  Vital Signs Last 24 Hrs  T(C): 37.3 (16 Oct 2021 11:32), Max: 38.8 (16 Oct 2021 07:50)  T(F): 99.2 (16 Oct 2021 11:32), Max: 101.8 (16 Oct 2021 07:50)  HR: 65 (16 Oct 2021 11:32) (65 - 71)  BP: 114/72 (16 Oct 2021 11:32) (105/63 - 120/71)  BP(mean): --  RR: 18 (16 Oct 2021 11:32) (17 - 20)  SpO2: 100% (16 Oct 2021 11:32) (95% - 100%) Daily Height in cm: 179.1 (16 Oct 2021 05:30)    Daily Weight in k.7 (16 Oct 2021 05:30)  CAPILLARY BLOOD GLUCOSE      POCT Blood Glucose.: 199 mg/dL (16 Oct 2021 11:08)  POCT Blood Glucose.: 137 mg/dL (16 Oct 2021 07:41)    I&O's Summary      HISTORY:  PAST MEDICAL & SURGICAL HISTORY:  HTN (hypertension)    DM (diabetes mellitus), type 2    High cholesterol    CHF (congestive heart failure)    Pneumonia    COPD (chronic obstructive pulmonary disease)    Pacemaker    AICD (automatic cardioverter/defibrillator) present    S/P CABG x 3  cabg3  in     S/P cardiac pacemaker procedure  defibrilator     S/P cardiac cath  cardiac stentin     S/P hernia repair  hernia [id number]    Allergies    No Known Allergies    Intolerances       LABS:                        9.2    11.86 )-----------( 119      ( 16 Oct 2021 07:56 )             27.9     10-    132<L>  |  91<L>  |  68<H>  ----------------------------<  138<H>  3.7   |  33<H>  |  2.69<H>    Ca    9.0      16 Oct 2021 07:56    TPro  7.0  /  Alb  2.6<L>  /  TBili  0.8  /  DBili  x   /  AST  40<H>  /  ALT  26  /  AlkPhos  124<H>  10-    PT/INR - ( 15 Oct 2021 23:14 )   PT: 23.7 sec;   INR: 2.12 ratio         PTT - ( 15 Oct 2021 23:14 )  PTT:33.0 sec  LIVER FUNCTIONS - ( 16 Oct 2021 07:56 )  Alb: 2.6 g/dL / Pro: 7.0 gm/dL / ALK PHOS: 124 U/L / ALT: 26 U/L / AST: 40 U/L / GGT: x           Urinalysis Basic - ( 15 Oct 2021 23:23 )    Color: Yellow / Appearance: very cloudy / S.010 / pH: x  Gluc: x / Ketone: Negative  / Bili: Negative / Urobili: Negative mg/dL   Blood: x / Protein: 30 mg/dL / Nitrite: Negative   Leuk Esterase: Moderate / RBC: 3-5 /HPF / WBC 26-50   Sq Epi: x / Non Sq Epi: Occasional / Bacteria: Many              MEDICATIONS:  MEDICATIONS  (STANDING):  aspirin enteric coated 81 milliGRAM(s) Oral daily  budesonide 160 MICROgram(s)/formoterol 4.5 MICROgram(s) Inhaler 2 Puff(s) Inhalation two times a day  dextrose 40% Gel 15 Gram(s) Oral once  dextrose 5%. 1000 milliLiter(s) (50 mL/Hr) IV Continuous <Continuous>  dextrose 5%. 1000 milliLiter(s) (100 mL/Hr) IV Continuous <Continuous>  dextrose 50% Injectable 25 Gram(s) IV Push once  dextrose 50% Injectable 12.5 Gram(s) IV Push once  dextrose 50% Injectable 25 Gram(s) IV Push once  furosemide    Tablet 40 milliGRAM(s) Oral daily  glucagon  Injectable 1 milliGRAM(s) IntraMuscular once  insulin lispro (ADMELOG) corrective regimen sliding scale   SubCutaneous three times a day before meals  piperacillin/tazobactam IVPB.. 3.375 Gram(s) IV Intermittent every 8 hours  rivaroxaban 15 milliGRAM(s) Oral daily  simvastatin 40 milliGRAM(s) Oral at bedtime  tiotropium 18 MICROgram(s) Capsule 1 Capsule(s) Inhalation daily    MEDICATIONS  (PRN):  acetaminophen   Tablet .. 650 milliGRAM(s) Oral every 6 hours PRN Temp greater or equal to 38C (100.4F), Moderate Pain (4 - 6)  ALBUTerol    90 MICROgram(s) HFA Inhaler 2 Puff(s) Inhalation every 6 hours PRN Shortness of Breath and/or Wheezing        CT A/P: No evidence of obstructive uropathy. Thickening of the urinary bladder walls despite underdistention with infiltration of the perivesicular fat concerning for cystitis. Trace complex fluid in the right paracolic gutter extending up to the right inguinal canal with infiltrative changes in the right inguinal region which may be secondary to recent procedure. Correlate with clinical history.

## 2021-10-16 NOTE — H&P ADULT - NSHPREVIEWOFSYSTEMS_GEN_ALL_CORE
Constitutional: fever, chills positive.    Ears: no hearing changes or ear pain,   Nose: no nasal congestion, sinus pain, or rhinorrhea  Cardio: no chest pain, orthopnea, edema, or palpitations  Resp: no dyspnea, cough, wheezing  GI: nausea positive.  No vomiting, diarrhea, constipation, hematochezia, or melena  : no dysuria, urinary frequency, hematuria  MSK: no back pain, neck pain  Skin: no rash, pruritis   Neuro: weakness positive.  No syncope   Heme/Lymph: no bruising or bleeding

## 2021-10-16 NOTE — H&P ADULT - NSHPLABSRESULTS_GEN_ALL_CORE
Recent Vitals  T(C): 37.2 (10-16-21 @ 01:59), Max: 38.3 (10-15-21 @ 22:32)  HR: 71 (10-16-21 @ 01:59) (70 - 71)  BP: 105/63 (10-16-21 @ 01:59) (105/63 - 120/71)  RR: 18 (10-16-21 @ 01:59) (17 - 20)  SpO2: 98% (10-16-21 @ 01:59) (97% - 100%)                        9.8    14.13 )-----------( 134      ( 15 Oct 2021 23:14 )             30.1     10-15    133<L>  |  91<L>  |  71<H>  ----------------------------<  160<H>  4.2   |  32<H>  |  2.98<H>    Ca    9.3      15 Oct 2021 23:14    TPro  7.8  /  Alb  3.1<L>  /  TBili  0.7  /  DBili  x   /  AST  42<H>  /  ALT  30  /  AlkPhos  133<H>  1015    PT/INR - ( 15 Oct 2021 23:14 )   PT: 23.7 sec;   INR: 2.12 ratio         PTT - ( 15 Oct 2021 23:14 )  PTT:33.0 sec  LIVER FUNCTIONS - ( 15 Oct 2021 23:14 )  Alb: 3.1 g/dL / Pro: 7.8 gm/dL / ALK PHOS: 133 U/L / ALT: 30 U/L / AST: 42 U/L / GGT: x           Urinalysis Basic - ( 15 Oct 2021 23:23 )    Color: Yellow / Appearance: very cloudy / S.010 / pH: x  Gluc: x / Ketone: Negative  / Bili: Negative / Urobili: Negative mg/dL   Blood: x / Protein: 30 mg/dL / Nitrite: Negative   Leuk Esterase: Moderate / RBC: 3-5 /HPF / WBC 26-50   Sq Epi: x / Non Sq Epi: Occasional / Bacteria: Many        Home Medications:  metoprolol succinate 50 mg oral tablet, extended release: 1 tab(s) orally once a day (10 Sep 2021 12:41)  simvastatin 40 mg oral tablet: 1 tab(s) orally once a day (at bedtime) (15 Jul 2021 12:35)  tamsulosin 0.4 mg oral capsule: 1 cap(s) orally once a day (at bedtime) (15 Jul 2021 12:35)  Toujeo SoloStar 300 units/mL subcutaneous solution: 53  subcutaneous once a day (at bedtime) (13 Mar 2021 15:08)  Trulicity Pen 1.5 mg/0.5 mL subcutaneous solution: once a week (13 Mar 2021 15:08)

## 2021-10-16 NOTE — H&P ADULT - NSICDXPASTSURGICALHX_GEN_ALL_CORE_FT
PAST SURGICAL HISTORY:  S/P CABG x 3 cabg3  in 2003    S/P cardiac cath cardiac stentin 2011    S/P cardiac pacemaker procedure defibrilator 2012    S/P hernia repair hernia [id number]

## 2021-10-16 NOTE — CONSULT NOTE ADULT - SUBJECTIVE AND OBJECTIVE BOX
[facility] NEPHROLOGY SERVICES, [facility]  NEPHROLOGY AND HYPERTENSION  [address]  [address]  [place] [address]  [phone]    MD DANIEL GARRETT MD ANDREY GONCHARUK, MD MADHU KORRAPATI, MD YELENA ROSENBERG, MD BINNY KOSHY, MD CHRISTOPHER CAPUTO, MD EDWARD BOVER, MD      Information from chart:  "Patient is a 76y old  Male who presents with a chief complaint of fever, sepsis (16 Oct 2021 22:57)    HPI:  Patient is a 75M with a PMH of HFrEF s/p ICD on xarelto, COPD on home O2 (3L), HTN, DM, HLD who presents to the ED for fever.  Patient AAOx3 however is lethargic, poor historian.  Collateral history provided by wife - Judi - at the bedside.  Reports patient became lethargic, had poor PO intake, and had subjective fever and chills today.  Wife also noted change in color and foul odor of patient's urine.  Wife also noted recent hypotension of the patient - spoke to patient's PMD who took patient off the clonidine 0.3 patch.  Patient recently discharged from [facility] after being admitted for ~30 days.  Per wife, patient was admitted for CHF exacerbation, then had a TAVR performed on .  Was in the CCU for 8 days afterwards.  Reports no complications from the procedure.  Patient has no other complaints.  Febrile to 101 in ED, labs reveal mild leukocytosis and elevated creatinine.  Will admit to med surg.  (16 Oct 2021 03:50)   "      Patient with recent hospital course at [facility]; post TAVR      PAST MEDICAL & SURGICAL HISTORY:  HTN (hypertension)    DM (diabetes mellitus), type 2    High cholesterol    CHF (congestive heart failure)    Pneumonia    COPD (chronic obstructive pulmonary disease)    Pacemaker    AICD (automatic cardioverter/defibrillator) present    S/P CABG x 3  cabg3  in     S/P cardiac pacemaker procedure  defibrilator     S/P cardiac cath  cardiac stentin     S/P hernia repair  hernia [id number]      FAMILY HISTORY:  FH: HTN (hypertension)      Allergies    No Known Allergies    Intolerances      Home Medications:  metoprolol succinate 50 mg oral tablet, extended release: 1 tab(s) orally once a day (10 Sep 2021 12:41)  simvastatin 40 mg oral tablet: 1 tab(s) orally once a day (at bedtime) (15 Jul 2021 12:35)  tamsulosin 0.4 mg oral capsule: 1 cap(s) orally once a day (at bedtime) (15 Jul 2021 12:35)  Toujeo SoloStar 300 units/mL subcutaneous solution: 53  subcutaneous once a day (at bedtime) (13 Mar 2021 15:08)  Trulicity Pen 1.5 mg/0.5 mL subcutaneous solution: once a week (13 Mar 2021 15:08)    MEDICATIONS  (STANDING):  aspirin enteric coated 81 milliGRAM(s) Oral daily  budesonide 160 MICROgram(s)/formoterol 4.5 MICROgram(s) Inhaler 2 Puff(s) Inhalation two times a day  dextrose 40% Gel 15 Gram(s) Oral once  dextrose 5%. 1000 milliLiter(s) (50 mL/Hr) IV Continuous <Continuous>  dextrose 5%. 1000 milliLiter(s) (100 mL/Hr) IV Continuous <Continuous>  dextrose 50% Injectable 25 Gram(s) IV Push once  dextrose 50% Injectable 12.5 Gram(s) IV Push once  dextrose 50% Injectable 25 Gram(s) IV Push once  furosemide    Tablet 40 milliGRAM(s) Oral daily  glucagon  Injectable 1 milliGRAM(s) IntraMuscular once  insulin lispro (ADMELOG) corrective regimen sliding scale   SubCutaneous three times a day before meals  rivaroxaban 15 milliGRAM(s) Oral daily  simvastatin 40 milliGRAM(s) Oral at bedtime  tiotropium 18 MICROgram(s) Capsule 1 Capsule(s) Inhalation daily    MEDICATIONS  (PRN):  acetaminophen   Tablet .. 650 milliGRAM(s) Oral every 6 hours PRN Temp greater or equal to 38C (100.4F), Moderate Pain (4 - 6)  ALBUTerol    90 MICROgram(s) HFA Inhaler 2 Puff(s) Inhalation every 6 hours PRN Shortness of Breath and/or Wheezing    Vital Signs Last 24 Hrs  T(C): 37.2 (16 Oct 2021 23:13), Max: 38.8 (16 Oct 2021 07:50)  T(F): 98.9 (16 Oct 2021 23:13), Max: 101.8 (16 Oct 2021 07:50)  HR: 70 (16 Oct 2021 23:13) (64 - 71)  BP: 120/70 (16 Oct 2021 23:13) (100/64 - 120/70)  BP(mean): --  RR: 18 (16 Oct 2021 23:13) (17 - 20)  SpO2: 100% (16 Oct 2021 23:13) (95% - 100%)    Daily Height in cm: 179.1 (16 Oct 2021 05:30)    Daily Weight in k.7 (16 Oct 2021 05:30)    10-16-21 @ 07:01  -  10-16-21 @ 23:34  --------------------------------------------------------  IN: 200 mL / OUT: 0 mL / NET: 200 mL      CAPILLARY BLOOD GLUCOSE      POCT Blood Glucose.: 253 mg/dL (16 Oct 2021 22:14)  POCT Blood Glucose.: 191 mg/dL (16 Oct 2021 16:45)  POCT Blood Glucose.: 199 mg/dL (16 Oct 2021 11:08)  POCT Blood Glucose.: 137 mg/dL (16 Oct 2021 07:41)    PHYSICAL EXAM:      T(C): 37.2 (10-16-21 @ 23:13), Max: 38.8 (10-16-21 @ 07:50)  HR: 70 (10-16-21 @ 23:13) (64 - 71)  BP: 120/70 (10-16-21 @ 23:13) (100/64 - 120/70)  RR: 18 (10-16-21 @ 23:13) (17 - 20)  SpO2: 100% (10-16-21 @ 23:13) (95% - 100%)  Wt(kg): --  Lungs clear  Heart S1S2  Abd soft NT ND  Extremities:   tr edema              10-16    132<L>  |  91<L>  |  68<H>  ----------------------------<  138<H>  3.7   |  33<H>  |  2.69<H>    Ca    9.0      16 Oct 2021 07:56    TPro  7.0  /  Alb  2.6<L>  /  TBili  0.8  /  DBili  x   /  AST  40<H>  /  ALT  26  /  AlkPhos  124<H>  10-16                          9.2    11.86 )-----------( 119      ( 16 Oct 2021 07:56 )             27.9     Creatinine Trend: 2.69<--, 2.98<--  Urinalysis Basic - ( 15 Oct 2021 23:23 )    Color: Yellow / Appearance: very cloudy / S.010 / pH: x  Gluc: x / Ketone: Negative  / Bili: Negative / Urobili: Negative mg/dL   Blood: x / Protein: 30 mg/dL / Nitrite: Negative   Leuk Esterase: Moderate / RBC: 3-5 /HPF / WBC 26-50   Sq Epi: x / Non Sq Epi: Occasional / Bacteria: Many      Trend Bun/Cr  10-16-21 @ 07:56  BUN/CR -  68<H> / 2.69<H>  10-15-21 @ 23:14  BUN/CR -  71<H> / 2.98<H>  09-10-21 @ 06:36  BUN/CR -  50<H> / 1.64<H>  21 @ 07:29  BUN/CR -  47<H> / 1.51<H>  21 @ 07:26  BUN/CR -  46<H> / 1.73<H>  21 @ 10:30  BUN/CR -  47<H> / 1.87<H>  21 @ 07:13  BUN/CR -  37<H> / 2.10<H>  21 @ 07:52  BUN/CR -  27<H> / 1.76<H>  21 @ 08:55  BUN/CR -  73<H> / 2.19<H>  21 @ 07:55  BUN/CR -  75<H> / 2.30<H>  21 @ 07:33  BUN/CR -  58<H> / 2.10<H>  21 @ 00:50  BUN/CR -  38<H> / 2.00<H>        Assessment   DENISHA CKD 3; pre/post renal azotemia; risk for infectious AIN  Risk for post TAVR; contrast and atheroembolic injury      Plan  Judicious IVF  Urine eos       Jon Rowe MD
        Patient chart reviewed, full consult to follow.     Thank you for the courtesy of this consultation.
JO VARELA  MRN-[id number]        Patient is a 76y old  Male who presents with a chief complaint of fever, sepsis (16 Oct 2021 15:56)      HPI:  Patient is a 75M with a PMH of HFrEF s/p ICD on xarelto, COPD on home O2 (3L), HTN, DM, HLD who presents to the ED for fever.  Patient AAOx3 however is lethargic, poor historian.  Collateral history provided by wife - Judi - at the bedside.  Reports patient became lethargic, had poor PO intake, and had subjective fever and chills today.  Wife also noted change in color and foul odor of patient's urine.  Wife also noted recent hypotension of the patient - spoke to patient's PMD who took patient off the clonidine 0.3 patch.  Patient recently discharged from [facility] after being admitted for ~30 days.  Per wife, patient was admitted for CHF exacerbation, then had a TAVR performed on .  Was in the CCU for 8 days afterwards.  Reports no complications from the procedure.  Patient has no other complaints.  Febrile to 101 in ED, labs reveal mild leukocytosis and elevated creatinine.  Will admit to med surg.  (16 Oct 2021 03:50)      ID consulted for workup and antibiotic management     PAST MEDICAL & SURGICAL HISTORY:  HTN (hypertension)    DM (diabetes mellitus), type 2    High cholesterol    CHF (congestive heart failure)    Pneumonia    COPD (chronic obstructive pulmonary disease)    Pacemaker    AICD (automatic cardioverter/defibrillator) present    S/P CABG x 3  cabg3  in     S/P cardiac pacemaker procedure  defibrilator     S/P cardiac cath  cardiac stentin     S/P hernia repair  hernia [id number]        Allergies  No Known Allergies        ANTIMICROBIALS:  piperacillin/tazobactam IVPB.. 3.375 every 8 hours      MEDICATIONS  (STANDING):  piperacillin/tazobactam IVPB..   25 mL/Hr IV Intermittent (10-16-21 @ 22:18)   25 mL/Hr IV Intermittent (10-16-21 @ 15:35)   25 mL/Hr IV Intermittent (10-16-21 @ 06:50)    piperacillin/tazobactam IVPB...   200 mL/Hr IV Intermittent (10-15-21 @ 23:17)    vancomycin  IVPB.   250 mL/Hr IV Intermittent (10-15-21 @ 23:54)        OTHER MEDS: MEDICATIONS  (STANDING):  acetaminophen   Tablet .. 650 every 6 hours PRN  ALBUTerol    90 MICROgram(s) HFA Inhaler 2 every 6 hours PRN  aspirin enteric coated 81 daily  budesonide 160 MICROgram(s)/formoterol 4.5 MICROgram(s) Inhaler 2 two times a day  dextrose 40% Gel 15 once  dextrose 50% Injectable 25 once  dextrose 50% Injectable 12.5 once  dextrose 50% Injectable 25 once  furosemide    Tablet 40 daily  glucagon  Injectable 1 once  insulin lispro (ADMELOG) corrective regimen sliding scale  three times a day before meals  rivaroxaban 15 daily  simvastatin 40 at bedtime  tiotropium 18 MICROgram(s) Capsule 1 daily      SOCIAL HISTORY:     former smoker    FAMILY HISTORY:  FH: HTN (hypertension)  parents had diabetes         REVIEW OF SYSTEMS  [  ] ROS unobtainable because:    [X  ] All other systems negative except as noted below:	    Constitutional:  [ X] fever [ ] chills  [ ] weight loss  [ X] weakness  Skin:  [ ] rash [ ] phlebitis	  Eyes: [ ] icterus [ ] pain  [ ] discharge	  ENMT: [ ] sore throat  [ ] thrush [ ] ulcers [ ] exudates  Respiratory: [X ] dyspnea [ ] hemoptysis [x ] cough [ ] sputum	  Cardiovascular:  [ ] chest pain [ ] palpitations [ ] edema	  Gastrointestinal:  [ ] nausea [ ] vomiting [ ] diarrhea [ ] constipation [ ] pain	  Genitourinary:  [ x] dysuria [ ] frequency [ ] hematuria [ ] discharge [ ] flank pain  [ ] incontinence  Musculoskeletal:  [ ] myalgias [ ] arthralgias [ ] arthritis  [ ] back pain  Neurological:  [ ] headache [ ] seizures  [ ] confusion/altered mental status  Psychiatric:  [ ] anxiety [ ] depression	  Hematology/Lymphatics:  [ ] lymphadenopathy  Endocrine:  [ ] adrenal [ ] thyroid  Allergic/Immunologic:	 [ ] transplant [ ] seasonal    Vital Signs Last 24 Hrs  T(F): 97.9 (10-16-21 @ 17:30), Max: 101.8 (10-16-21 @ 07:50)    Vital Signs Last 24 Hrs  HR: 64 (10-16-21 @ 19:47) (64 - 71)  BP: 100/64 (10-16-21 @ 17:30) (100/64 - 120/68)  RR: 17 (10-16-21 @ 17:30)  SpO2: 95% (10-16-21 @ 19:47) (95% - 100%)  Wt(kg): --    PHYSICAL EXAM:  Constitutional: non-toxic, no distress, on nasal cannula   HEAD/EYES: anicteric, no conjunctival injection  ENT:  supple, no thrush  Cardiovascular:   normal S1, S2, faint murmur, no edema, left chest wall device palpable  Respiratory:  crackles  bilaterally, no wheezes  GI:  soft, non-tender, normal bowel sounds  :  no ro, no CVA tenderness, groin site where tavr was done with steristrops and healing well with no surrounding erythema though slight firmness over the right groin  Musculoskeletal:  no synovitis, normal ROM  Neurologic: awake and alert, normal strength, no focal findings  Skin:  no rash, no erythema, no phlebitis  Heme/Onc: no lymphadenopathy   Psychiatric:  awake, alert, appropriate mood          WBC Count: 11.86 K/uL (10-16 @ 07:56)  WBC Count: 14.13 K/uL (10-15 @ 23:14)                            9.2    11.86 )-----------( 119      ( 16 Oct 2021 07:56 )             27.9       10-16    132<L>  |  91<L>  |  68<H>  ----------------------------<  138<H>  3.7   |  33<H>  |  2.69<H>    Ca    9.0      16 Oct 2021 07:56    TPro  7.0  /  Alb  2.6<L>  /  TBili  0.8  /  DBili  x   /  AST  40<H>  /  ALT  26  /  AlkPhos  124<H>  10-16      Creatinine Trend: 2.69<--, 2.98<--    Urinalysis Basic - ( 15 Oct 2021 23:23 )    Color: Yellow / Appearance: very cloudy / S.010 / pH: x  Gluc: x / Ketone: Negative  / Bili: Negative / Urobili: Negative mg/dL   Blood: x / Protein: 30 mg/dL / Nitrite: Negative   Leuk Esterase: Moderate / RBC: 3-5 /HPF / WBC 26-50   Sq Epi: x / Non Sq Epi: Occasional / Bacteria: Many        MICROBIOLOGY:  Flu With COVID-19 By MALATHI (10.15.21 @ 23:14)    SARS-CoV-2 Result: NotDetec: EUA/IVD    Influenza A Result: NotDetec: EUA/IVD    Influenza B Result: NotDetec: EUA/IVD            RADIOLOGY:  CT Abdomen and Pelvis No Cont (10.16.21 @ 01:04)   IMPRESSION:  No evidence of obstructive uropathy. Thickening of the urinary bladder walls despite underdistention with infiltration of the perivesicular fat concerning for cystitis.    Trace complex fluid in the right paracolic gutter extending up to the right inguinal canal with infiltrative changes in the right inguinal region which may be secondary to recent procedure. Correlate with clinical history.

## 2021-10-16 NOTE — H&P ADULT - ASSESSMENT
Patient is a 75M with a PMH of HFrEF s/p ICD on xarelto, COPD on home O2 (3L), HTN, DM, HLD who presents to the ED for fever.  Patient AAOx3 however is lethargic, poor historian.  Collateral history provided by wife - Judi - at the bedside.  Reports patient became lethargic, had poor PO intake, and had subjective fever and chills today.  Wife also noted change in color and foul odor of patient's urine.  Wife also noted recent hypotension of the patient - spoke to patient's PMD who took patient off the clonidine 0.3 patch.  Patient recently discharged from [facility] after being admitted for ~30 days.  Per wife, patient was admitted for CHF exacerbation, then had a TAVR performed on 9/30.  Was in the CCU for 8 days afterwards.  Reports no complications from the procedure.  Patient has no other complaints.  Febrile to 101 in ED, labs reveal mild leukocytosis and elevated creatinine.  Will admit to med surg.     IMPROVE VTE Individual Risk Assessment          RISK                                                          Points  [  ] Previous VTE                                                3  [  ] Thrombophilia                                             2  [  ] Lower limb paralysis                                    2        (unable to hold up >15 seconds)    [  ] Current Cancer                                             2         (within 6 months)  [  ] Immobilization > 24 hrs                              1  [  ] ICU/CCU stay > 24 hours                            1  [  ] Age > 60                                                    1    IMPROVE VTE Score - 1 Birth Control Pills Counseling: Birth Control Pill Counseling: I discussed with the patient the potential side effects of OCPs including but not limited to increased risk of stroke, heart attack, thrombophlebitis, deep venous thrombosis, hepatic adenomas, breast changes, GI upset, headaches, and depression.  The patient verbalized understanding of the proper use and possible adverse effects of OCPs. All of the patient's questions and concerns were addressed.

## 2021-10-17 LAB
-  K. PNEUMONIAE GROUP: SIGNIFICANT CHANGE UP
A1C WITH ESTIMATED AVERAGE GLUCOSE RESULT: 7.7 % — HIGH (ref 4–5.6)
ALBUMIN SERPL ELPH-MCNC: 2.4 G/DL — LOW (ref 3.3–5)
ALP SERPL-CCNC: 164 U/L — HIGH (ref 40–120)
ALT FLD-CCNC: 48 U/L — SIGNIFICANT CHANGE UP (ref 12–78)
ANION GAP SERPL CALC-SCNC: 8 MMOL/L — SIGNIFICANT CHANGE UP (ref 5–17)
AST SERPL-CCNC: 70 U/L — HIGH (ref 15–37)
BILIRUB SERPL-MCNC: 0.7 MG/DL — SIGNIFICANT CHANGE UP (ref 0.2–1.2)
BUN SERPL-MCNC: 64 MG/DL — HIGH (ref 7–23)
CALCIUM SERPL-MCNC: 9.1 MG/DL — SIGNIFICANT CHANGE UP (ref 8.5–10.1)
CHLORIDE SERPL-SCNC: 93 MMOL/L — LOW (ref 96–108)
CO2 SERPL-SCNC: 31 MMOL/L — SIGNIFICANT CHANGE UP (ref 22–31)
COVID-19 SPIKE DOMAIN AB INTERP: POSITIVE
COVID-19 SPIKE DOMAIN ANTIBODY RESULT: >250 U/ML — HIGH
CREAT SERPL-MCNC: 2.4 MG/DL — HIGH (ref 0.5–1.3)
EOSINOPHIL NFR URNS MANUAL: NEGATIVE — SIGNIFICANT CHANGE UP
ESTIMATED AVERAGE GLUCOSE: 174 MG/DL — HIGH (ref 68–114)
GLUCOSE BLDC GLUCOMTR-MCNC: 150 MG/DL — HIGH (ref 70–99)
GLUCOSE BLDC GLUCOMTR-MCNC: 193 MG/DL — HIGH (ref 70–99)
GLUCOSE BLDC GLUCOMTR-MCNC: 210 MG/DL — HIGH (ref 70–99)
GLUCOSE BLDC GLUCOMTR-MCNC: 225 MG/DL — HIGH (ref 70–99)
GLUCOSE SERPL-MCNC: 215 MG/DL — HIGH (ref 70–99)
GRAM STN FLD: SIGNIFICANT CHANGE UP
HCT VFR BLD CALC: 29.2 % — LOW (ref 39–50)
HGB BLD-MCNC: 9.4 G/DL — LOW (ref 13–17)
LEGIONELLA AG UR QL: NEGATIVE — SIGNIFICANT CHANGE UP
MCHC RBC-ENTMCNC: 30.9 PG — SIGNIFICANT CHANGE UP (ref 27–34)
MCHC RBC-ENTMCNC: 32.2 GM/DL — SIGNIFICANT CHANGE UP (ref 32–36)
MCV RBC AUTO: 96.1 FL — SIGNIFICANT CHANGE UP (ref 80–100)
METHOD TYPE: SIGNIFICANT CHANGE UP
NRBC # BLD: 0 /100 WBCS — SIGNIFICANT CHANGE UP (ref 0–0)
PLATELET # BLD AUTO: 118 K/UL — LOW (ref 150–400)
POTASSIUM SERPL-MCNC: 3.6 MMOL/L — SIGNIFICANT CHANGE UP (ref 3.5–5.3)
POTASSIUM SERPL-SCNC: 3.6 MMOL/L — SIGNIFICANT CHANGE UP (ref 3.5–5.3)
PROT SERPL-MCNC: 6.9 GM/DL — SIGNIFICANT CHANGE UP (ref 6–8.3)
RBC # BLD: 3.04 M/UL — LOW (ref 4.2–5.8)
RBC # FLD: 16.3 % — HIGH (ref 10.3–14.5)
SARS-COV-2 IGG+IGM SERPL QL IA: >250 U/ML — HIGH
SARS-COV-2 IGG+IGM SERPL QL IA: POSITIVE
SODIUM SERPL-SCNC: 132 MMOL/L — LOW (ref 135–145)
SPECIMEN SOURCE: SIGNIFICANT CHANGE UP
VANCOMYCIN FLD-MCNC: 6 UG/ML — SIGNIFICANT CHANGE UP
WBC # BLD: 8.74 K/UL — SIGNIFICANT CHANGE UP (ref 3.8–10.5)
WBC # FLD AUTO: 8.74 K/UL — SIGNIFICANT CHANGE UP (ref 3.8–10.5)

## 2021-10-17 PROCEDURE — 99233 SBSQ HOSP IP/OBS HIGH 50: CPT

## 2021-10-17 RX ORDER — CHLORHEXIDINE GLUCONATE 213 G/1000ML
15 SOLUTION TOPICAL EVERY 12 HOURS
Refills: 0 | Status: DISCONTINUED | OUTPATIENT
Start: 2021-10-17 | End: 2021-10-17

## 2021-10-17 RX ORDER — ONDANSETRON 8 MG/1
4 TABLET, FILM COATED ORAL ONCE
Refills: 0 | Status: COMPLETED | OUTPATIENT
Start: 2021-10-17 | End: 2021-10-17

## 2021-10-17 RX ADMIN — Medication 650 MILLIGRAM(S): at 15:33

## 2021-10-17 RX ADMIN — Medication 81 MILLIGRAM(S): at 12:25

## 2021-10-17 RX ADMIN — CEFEPIME 100 MILLIGRAM(S): 1 INJECTION, POWDER, FOR SOLUTION INTRAMUSCULAR; INTRAVENOUS at 05:49

## 2021-10-17 RX ADMIN — Medication 650 MILLIGRAM(S): at 22:18

## 2021-10-17 RX ADMIN — RIVAROXABAN 15 MILLIGRAM(S): KIT at 12:26

## 2021-10-17 RX ADMIN — Medication 2: at 07:52

## 2021-10-17 RX ADMIN — Medication 2: at 11:34

## 2021-10-17 RX ADMIN — Medication 40 MILLIGRAM(S): at 05:49

## 2021-10-17 RX ADMIN — SIMVASTATIN 40 MILLIGRAM(S): 20 TABLET, FILM COATED ORAL at 22:10

## 2021-10-17 RX ADMIN — ONDANSETRON 4 MILLIGRAM(S): 8 TABLET, FILM COATED ORAL at 15:05

## 2021-10-17 RX ADMIN — Medication 650 MILLIGRAM(S): at 14:33

## 2021-10-17 RX ADMIN — Medication 650 MILLIGRAM(S): at 23:18

## 2021-10-17 NOTE — PROGRESS NOTE ADULT - ASSESSMENT
75M with a PMH of HFrEF s/p ICD on xarelto, COPD on home O2 (3L), HTN, DM, HLD who presents to the ED for fever.  S/p recent hospitalization at [facility] s/p TAVR. CT A/p showed Thickening of the urinary bladder walls despite underdistention with infiltration of the perivesicular fat concerning for cystitis. Trace complex fluid in the right paracolic gutter extending up to the right inguinal canal with infiltrative changes in the right inguinal region.  10/16 blood cultures positive for gram negative rods.      # Acute Cystitis - continue Abx.  Recent hospitalization, on IV Abx.  F/u cultures.  CT findings of ? trace complex fluid extending up R inguinal canal.  No tenderness / findings on exam.  Monitor for now.    # Gram negative Bacteremia - repeat cultures requested.  s/p TAVR, has ICD.  Will d/w ID regarding further workup.    # Sepsis - due to above.  Improved  # DENISHA - Cr improving.  No hydronephrosis on CT.    # Severe COPD - on O2.  no SOB / complaints.  # Chronic Hypoxic Respiratory Failure - pt reports on 2-3L of O2 at home, presently baseline.  # Chronic Systolic CHF - Appears to be euvolemic.  On Lasix.   # Essential hypertension. BP stable.  # Diabetes Type II - monitor fingersticks.  Insulin coverage for hyperglycemia.  # Inpatient DVT Proph - on anticoagulation    75M with a PMH of HFrEF s/p ICD on xarelto, COPD on home O2 (3L), HTN, DM, HLD who presents to the ED for fever.  S/p recent hospitalization at [facility] s/p TAVR. CT A/p showed Thickening of the urinary bladder walls despite underdistention with infiltration of the perivesicular fat concerning for cystitis. Trace complex fluid in the right paracolic gutter extending up to the right inguinal canal with infiltrative changes in the right inguinal region.  10/16 blood cultures positive for gram negative rods.      # Acute Cystitis - continue Abx.  Recent hospitalization, on IV Abx.  F/u cultures.  CT findings of ? trace complex fluid extending up R inguinal canal.  No tenderness / findings on exam.  Monitor for now.    # Gram negative Bacteremia - repeat cultures requested.  s/p TAVR, has ICD.  Will d/w ID regarding further workup.    # Sepsis - due to above.  Improved  # DENISHA - Cr improving.  No hydronephrosis on CT.    # Severe COPD - on O2.  no SOB / complaints.  # Chronic Hypoxic Respiratory Failure - pt reports on 2-3L of O2 at home, presently baseline.  # Chronic Systolic CHF - Appears to be euvolemic.  On Lasix.   # Essential hypertension. BP stable.  # Diabetes Type II - monitor fingersticks.  Insulin coverage for hyperglycemia.  # Inpatient DVT Proph - on anticoagulation     Plan of care d/w pt's wife at bedside.  States pt on nocturnal CPAP at home.  Will resume.

## 2021-10-17 NOTE — PROGRESS NOTE ADULT - SUBJECTIVE AND OBJECTIVE BOX
Patient: JO VARELA [id number] 76y Male                            Hospitalist Attending Note    Denies complaints.  Fever noted this am.  Denies dysuria.      ____________________PHYSICAL EXAM:  GENERAL:  NAD, alert and oriented to person, place.   HEENT: NCAT  CARDIOVASCULAR:  S1, S2  LUNGS: CTAB  ABDOMEN:  soft, (-) tenderness,  (-) distension, (+) bowel sounds, (-) guarding, (-) rebound (-) rigidity  EXTREMITIES:  no cyanosis / clubbing / edema.   ____________________    VITALS:  Vital Signs Last 24 Hrs  T(C): 37.6 (17 Oct 2021 11:30), Max: 37.8 (17 Oct 2021 05:20)  T(F): 99.7 (17 Oct 2021 11:30), Max: 100 (17 Oct 2021 05:20)  HR: 70 (17 Oct 2021 11:30) (64 - 70)  BP: 128/80 (17 Oct 2021 11:30) (100/64 - 128/80)  BP(mean): --  RR: 19 (17 Oct 2021 11:30) (17 - 19)  SpO2: 100% (17 Oct 2021 11:30) (95% - 100%) Daily     Daily Weight in k.6 (17 Oct 2021 05:57)  CAPILLARY BLOOD GLUCOSE      POCT Blood Glucose.: 210 mg/dL (17 Oct 2021 11:05)  POCT Blood Glucose.: 225 mg/dL (17 Oct 2021 07:42)  POCT Blood Glucose.: 253 mg/dL (16 Oct 2021 22:14)  POCT Blood Glucose.: 191 mg/dL (16 Oct 2021 16:45)    I&O's Summary    16 Oct 2021 07:  -  17 Oct 2021 07:00  --------------------------------------------------------  IN: 250 mL / OUT: 1100 mL / NET: -850 mL    17 Oct 2021 07:01  -  17 Oct 2021 14:25  --------------------------------------------------------  IN: 440 mL / OUT: 700 mL / NET: -260 mL        LABS:                        9.4    8.74  )-----------( 118      ( 17 Oct 2021 07:13 )             29.2     10-17    132<L>  |  93<L>  |  64<H>  ----------------------------<  215<H>  3.6   |  31  |  2.40<H>    Ca    9.1      17 Oct 2021 07:13    TPro  6.9  /  Alb  2.4<L>  /  TBili  0.7  /  DBili  x   /  AST  70<H>  /  ALT  48  /  AlkPhos  164<H>  1017    PT/INR - ( 15 Oct 2021 23:14 )   PT: 23.7 sec;   INR: 2.12 ratio         PTT - ( 15 Oct 2021 23:14 )  PTT:33.0 sec  LIVER FUNCTIONS - ( 17 Oct 2021 07:13 )  Alb: 2.4 g/dL / Pro: 6.9 gm/dL / ALK PHOS: 164 U/L / ALT: 48 U/L / AST: 70 U/L / GGT: x           Urinalysis Basic - ( 15 Oct 2021 23:23 )    Color: Yellow / Appearance: very cloudy / S.010 / pH: x  Gluc: x / Ketone: Negative  / Bili: Negative / Urobili: Negative mg/dL   Blood: x / Protein: 30 mg/dL / Nitrite: Negative   Leuk Esterase: Moderate / RBC: 3-5 /HPF / WBC 26-50   Sq Epi: x / Non Sq Epi: Occasional / Bacteria: Many            Culture - Blood (collected 16 Oct 2021 12:03)  Source: .Blood Blood-Peripheral  Gram Stain (prelim) (16 Oct 2021 22:59):    Growth in aerobic bottle: Gram Negative Rods  Preliminary Report (16 Oct 2021 22:59):    Growth in aerobic bottle: Gram Negative Rods    ***Blood Panel PCR results on this specimen are available    approximately 3 hours after the Gram stain result.***    Gram stain, PCR, and/or culture results may not always    correspond due to difference in methodologies.    ************************************************************    This PCR assay was performed by multiplex PCR. This    Assay tests for 66 bacterial and resistance gene targets.    Please refer to the [facility] Labs test directory    at [email or web address] for details.  Organism: Blood Culture PCR (17 Oct 2021 01:00)  Organism: Blood Culture PCR (17 Oct 2021 01:00)    Culture - Blood (collected 16 Oct 2021 12:03)  Source: .Blood Blood-Peripheral  Gram Stain (prelim) (17 Oct 2021 11:16):    Growth in aerobic bottle: Gram Negative Rods    Growth in anaerobic bottle: Gram Negative Rods  Preliminary Report (17 Oct 2021 11:16):    Growth in aerobic bottle: Gram Negative Rods    Growth in anaerobic bottle: Gram Negative Rods        MEDICATIONS:  acetaminophen   Tablet .. 650 milliGRAM(s) Oral every 6 hours PRN  ALBUTerol    90 MICROgram(s) HFA Inhaler 2 Puff(s) Inhalation every 6 hours PRN  aspirin enteric coated 81 milliGRAM(s) Oral daily  budesonide 160 MICROgram(s)/formoterol 4.5 MICROgram(s) Inhaler 2 Puff(s) Inhalation two times a day  cefepime   IVPB 2000 milliGRAM(s) IV Intermittent every 24 hours  dextrose 40% Gel 15 Gram(s) Oral once  dextrose 5%. 1000 milliLiter(s) IV Continuous <Continuous>  dextrose 5%. 1000 milliLiter(s) IV Continuous <Continuous>  dextrose 50% Injectable 25 Gram(s) IV Push once  dextrose 50% Injectable 12.5 Gram(s) IV Push once  dextrose 50% Injectable 25 Gram(s) IV Push once  furosemide    Tablet 40 milliGRAM(s) Oral daily  glucagon  Injectable 1 milliGRAM(s) IntraMuscular once  insulin lispro (ADMELOG) corrective regimen sliding scale   SubCutaneous three times a day before meals  rivaroxaban 15 milliGRAM(s) Oral daily  simvastatin 40 milliGRAM(s) Oral at bedtime  tiotropium 18 MICROgram(s) Capsule 1 Capsule(s) Inhalation daily

## 2021-10-17 NOTE — PROVIDER CONTACT NOTE (CRITICAL VALUE NOTIFICATION) - RECOMMENDATIONS
Patient was on zosyn IV antibiotics changed to Cefepime, no further orders received.
Patient was on zosyn IV order changed to Cefepime, no further orders received

## 2021-10-17 NOTE — PROGRESS NOTE ADULT - ASSESSMENT
75M with a PMH of HFrEF s/p ICD on xarelto, COPD on home O2 (3L), HTN, DM, HLD who presents to the ED for fever.   Patient recently at [facility]  for CHF exacerbation, then had a TAVR performed on 9/30.  Was in the CCU for 8 days afterwards.  Reports no complications from the procedure. Febrile to 101 in ED   labs reveal mild leukocytosis and elevated creatinine.    CXR (I personally reviewed) with vascular congestion and cannot rule out infiltrate   groin site doesnt appear to be infected  UA is positive though unclear if he has urinary symptoms   high concern of bacteremia which could quite serious he has a device and new aortic valve   Zosyn not the best choice in a patient with HFrEF and who just bounced back from a CHF exacerbation as there is a high sodium load therefore have changed antibiotics to cefepime   He received a dose of vancomycin, VT 6.0, no longer needs vancomycin at this time     10/17: + blood cultures with klebsiella likely from urinary source given CT findings and he reports some urinary symptoms, will await sensitivities and adjust antibiotics accordingly     Klebsiella Bacteremia   Leukocytosis   s/p TAVR   DM  DENISHA    Plan:  follow up sensitivities of blood cultures   continue cefepime  no longer needs vancomycin   MRSA pcr   legionella   f/u blood and urine cultures   trend wbc  trend creatinine   good but not too tight glycemic control, HbA1c, ISS and management per medicine     D/W Dr. Thomas Guadalupe DO  Infectious Disease Attending  Pager [phone]  After 5pm/weekends please call [phone] for all inquiries and new consults

## 2021-10-18 LAB
-  AMIKACIN: SIGNIFICANT CHANGE UP
-  AMPICILLIN/SULBACTAM: SIGNIFICANT CHANGE UP
-  AMPICILLIN: SIGNIFICANT CHANGE UP
-  AZTREONAM: SIGNIFICANT CHANGE UP
-  CEFAZOLIN: SIGNIFICANT CHANGE UP
-  CEFEPIME: SIGNIFICANT CHANGE UP
-  CEFOXITIN: SIGNIFICANT CHANGE UP
-  CEFTRIAXONE: SIGNIFICANT CHANGE UP
-  CIPROFLOXACIN: SIGNIFICANT CHANGE UP
-  ERTAPENEM: SIGNIFICANT CHANGE UP
-  GENTAMICIN: SIGNIFICANT CHANGE UP
-  IMIPENEM: SIGNIFICANT CHANGE UP
-  LEVOFLOXACIN: SIGNIFICANT CHANGE UP
-  MEROPENEM: SIGNIFICANT CHANGE UP
-  PIPERACILLIN/TAZOBACTAM: SIGNIFICANT CHANGE UP
-  TOBRAMYCIN: SIGNIFICANT CHANGE UP
-  TRIMETHOPRIM/SULFAMETHOXAZOLE: SIGNIFICANT CHANGE UP
C3 SERPL-MCNC: 174 MG/DL — HIGH (ref 81–157)
CULTURE RESULTS: SIGNIFICANT CHANGE UP
GLUCOSE BLDC GLUCOMTR-MCNC: 153 MG/DL — HIGH (ref 70–99)
GLUCOSE BLDC GLUCOMTR-MCNC: 214 MG/DL — HIGH (ref 70–99)
GLUCOSE BLDC GLUCOMTR-MCNC: 222 MG/DL — HIGH (ref 70–99)
GLUCOSE BLDC GLUCOMTR-MCNC: 272 MG/DL — HIGH (ref 70–99)
GRAM STN FLD: SIGNIFICANT CHANGE UP
METHOD TYPE: SIGNIFICANT CHANGE UP
MRSA PCR RESULT.: SIGNIFICANT CHANGE UP
S AUREUS DNA NOSE QL NAA+PROBE: SIGNIFICANT CHANGE UP
SPECIMEN SOURCE: SIGNIFICANT CHANGE UP

## 2021-10-18 PROCEDURE — 99232 SBSQ HOSP IP/OBS MODERATE 35: CPT

## 2021-10-18 PROCEDURE — 99233 SBSQ HOSP IP/OBS HIGH 50: CPT

## 2021-10-18 RX ORDER — CEFTRIAXONE 500 MG/1
2000 INJECTION, POWDER, FOR SOLUTION INTRAMUSCULAR; INTRAVENOUS EVERY 24 HOURS
Refills: 0 | Status: DISCONTINUED | OUTPATIENT
Start: 2021-10-18 | End: 2021-10-19

## 2021-10-18 RX ADMIN — CEFEPIME 100 MILLIGRAM(S): 1 INJECTION, POWDER, FOR SOLUTION INTRAMUSCULAR; INTRAVENOUS at 05:21

## 2021-10-18 RX ADMIN — Medication 650 MILLIGRAM(S): at 23:33

## 2021-10-18 RX ADMIN — SIMVASTATIN 40 MILLIGRAM(S): 20 TABLET, FILM COATED ORAL at 22:15

## 2021-10-18 RX ADMIN — Medication 81 MILLIGRAM(S): at 12:12

## 2021-10-18 RX ADMIN — Medication 2: at 16:39

## 2021-10-18 RX ADMIN — Medication 40 MILLIGRAM(S): at 05:21

## 2021-10-18 RX ADMIN — Medication 3: at 12:12

## 2021-10-18 RX ADMIN — Medication 650 MILLIGRAM(S): at 22:19

## 2021-10-18 RX ADMIN — Medication 1: at 08:19

## 2021-10-18 RX ADMIN — RIVAROXABAN 15 MILLIGRAM(S): KIT at 12:42

## 2021-10-18 NOTE — PROGRESS NOTE ADULT - SUBJECTIVE AND OBJECTIVE BOX
Patient: JO VARELA [id number] 76y Male                            Hospitalist Attending Note    Denies complaints.  Denies dysuria.    Wife at bedside.    Feeling better.    ____________________PHYSICAL EXAM:  GENERAL:  NAD, alert and oriented to person, place.   HEENT: NCAT  CARDIOVASCULAR:  S1, S2  LUNGS: CTAB  ABDOMEN:  soft, (-) tenderness,  (-) distension, (+) bowel sounds, (-) guarding, (-) rebound (-) rigidity  EXTREMITIES:  no cyanosis / clubbing / edema.   ____________________    VITALS:  Vital Signs Last 24 Hrs  T(C): 37.7 (18 Oct 2021 11:59), Max: 37.7 (18 Oct 2021 11:59)  T(F): 99.8 (18 Oct 2021 11:59), Max: 99.8 (18 Oct 2021 11:59)  HR: 67 (18 Oct 2021 11:59) (67 - 82)  BP: 114/64 (18 Oct 2021 11:59) (113/69 - 124/80)  BP(mean): --  RR: 17 (18 Oct 2021 11:59) (17 - 20)  SpO2: 100% (18 Oct 2021 11:59) (94% - 100%) Daily     Daily Weight in k.7 (18 Oct 2021 05:50)  CAPILLARY BLOOD GLUCOSE      POCT Blood Glucose.: 272 mg/dL (18 Oct 2021 11:32)  POCT Blood Glucose.: 153 mg/dL (18 Oct 2021 08:09)  POCT Blood Glucose.: 193 mg/dL (17 Oct 2021 22:15)  POCT Blood Glucose.: 150 mg/dL (17 Oct 2021 16:22)    I&O's Summary    17 Oct 2021 07:01  -  18 Oct 2021 07:00  --------------------------------------------------------  IN: 810 mL / OUT: 1300 mL / NET: -490 mL        LABS:                        9.4    8.74  )-----------( 118      ( 17 Oct 2021 07:13 )             29.2     10-17    132<L>  |  93<L>  |  64<H>  ----------------------------<  215<H>  3.6   |  31  |  2.40<H>    Ca    9.1      17 Oct 2021 07:13    TPro  6.9  /  Alb  2.4<L>  /  TBili  0.7  /  DBili  x   /  AST  70<H>  /  ALT  48  /  AlkPhos  164<H>  10-17      LIVER FUNCTIONS - ( 17 Oct 2021 07:13 )  Alb: 2.4 g/dL / Pro: 6.9 gm/dL / ALK PHOS: 164 U/L / ALT: 48 U/L / AST: 70 U/L / GGT: x                   Culture - Blood (collected 17 Oct 2021 14:22)  Source: .Blood Blood  Preliminary Report (18 Oct 2021 15:01):    No growth to date.    Culture - Blood (collected 17 Oct 2021 14:22)  Source: .Blood Blood  Preliminary Report (18 Oct 2021 15:01):    No growth to date.    Culture - Blood (collected 16 Oct 2021 12:03)  Source: .Blood Blood-Peripheral  Gram Stain (prelim) (18 Oct 2021 10:14):    Growth in aerobic bottle: Gram Negative Rods    Growth in anaerobic bottle: Gram Negative Rods  Preliminary Report (18 Oct 2021 10:06):    Growth in aerobic bottle: Klebsiella pneumoniae    Growth in anaerobic bottle: Gram Negative Rods    ***Blood Panel PCR results on this specimen are available    approximately 3 hours after the Gram stain result.***    Gram stain, PCR, and/or culture results may not always    correspond due to difference in methodologies.    ************************************************************    This PCR assay was performed by multiplex PCR. This    Assay tests for 66 bacterial and resistance gene targets.    Please refer to the [facility] Labs test directory    at [email or web address] for details.  Organism: Blood Culture PCR  Klebsiella pneumoniae (18 Oct 2021 13:25)  Organism: Klebsiella pneumoniae (18 Oct 2021 13:25)  Organism: Blood Culture PCR (17 Oct 2021 01:00)    Culture - Blood (collected 16 Oct 2021 12:03)  Source: .Blood Blood-Peripheral  Gram Stain (prelim) (17 Oct 2021 11:16):    Growth in aerobic bottle: Gram Negative Rods    Growth in anaerobic bottle: Gram Negative Rods  Preliminary Report (18 Oct 2021 12:17):    Growth in aerobic and anaerobic bottles: Klebsiella pneumoniae    See previous culture [id number]    Culture - Urine (collected 16 Oct 2021 11:58)  Source: Clean Catch Clean Catch (Midstream)  Preliminary Report (18 Oct 2021 14:59):    >100,000 CFU/ml Gram Negative Rods        MEDICATIONS:  acetaminophen   Tablet .. 650 milliGRAM(s) Oral every 6 hours PRN  ALBUTerol    90 MICROgram(s) HFA Inhaler 2 Puff(s) Inhalation every 6 hours PRN  aspirin enteric coated 81 milliGRAM(s) Oral daily  budesonide 160 MICROgram(s)/formoterol 4.5 MICROgram(s) Inhaler 2 Puff(s) Inhalation two times a day  cefTRIAXone   IVPB 2000 milliGRAM(s) IV Intermittent every 24 hours  dextrose 40% Gel 15 Gram(s) Oral once  dextrose 5%. 1000 milliLiter(s) IV Continuous <Continuous>  dextrose 5%. 1000 milliLiter(s) IV Continuous <Continuous>  dextrose 50% Injectable 25 Gram(s) IV Push once  dextrose 50% Injectable 12.5 Gram(s) IV Push once  dextrose 50% Injectable 25 Gram(s) IV Push once  furosemide    Tablet 40 milliGRAM(s) Oral daily  glucagon  Injectable 1 milliGRAM(s) IntraMuscular once  insulin lispro (ADMELOG) corrective regimen sliding scale   SubCutaneous three times a day before meals  rivaroxaban 15 milliGRAM(s) Oral daily  simvastatin 40 milliGRAM(s) Oral at bedtime  tiotropium 18 MICROgram(s) Capsule 1 Capsule(s) Inhalation daily

## 2021-10-18 NOTE — PROVIDER CONTACT NOTE (CRITICAL VALUE NOTIFICATION) - TEST AND RESULT REPORTED:
Blood culture 10/15 growth in aerobic bottle gram negative rods (preliminary)
Blood Culture from 10/15  Growth in aerobic bottle, Klebsiella pneumonia.  Growth in aerobic bottle, gran negative
Preliminary results for blood culture collected on 10/15/2021 - growth in aerobic bottles gram (-) rods

## 2021-10-18 NOTE — PROGRESS NOTE ADULT - ASSESSMENT
75M with a PMH of HFrEF s/p ICD on xarelto, COPD on home O2 (3L), HTN, DM, HLD who presents to the ED for fever.   Patient recently at [facility]  for CHF exacerbation, then had a TAVR performed on 9/30.  Was in the CCU for 8 days afterwards.  Reports no complications from the procedure. Febrile to 101 in ED   labs reveal mild leukocytosis and elevated creatinine.    CXR (I personally reviewed) with vascular congestion and cannot rule out infiltrate   groin site doesnt appear to be infected  UA is positive though unclear if he has urinary symptoms   high concern of bacteremia which could quite serious he has a device and new aortic valve   Zosyn not the best choice in a patient with HFrEF and who just bounced back from a CHF exacerbation as there is a high sodium load therefore have changed antibiotics to cefepime   He received a dose of vancomycin, VT 6.0, no longer needs vancomycin at this time     10/17: + blood cultures with klebsiella likely from urinary source given CT findings and he reports some urinary symptoms, will await sensitivities and adjust antibiotics accordingly   10/8: no fevers today, no new cbc, legionella negative, prelim repeat blood cultures with no growth, UC pending, cefepime was changed to ceftriaxone.     Klebsiella Bacteremia   Leukocytosis   s/p TAVR   DM  DENISHA    Plan:  follow up sensitivities of blood cultures   stop cefepime  start ceftriaxone 2 grams IV q 24 hrs  no longer needs vancomycin   MRSA pcr not detected   legionella negative   f/u blood and urine cultures   trend wbc  trend creatinine   good but not too tight glycemic control, HbA1c 7.7, ISS and management per medicine  75M with a PMH of HFrEF s/p ICD on xarelto, COPD on home O2 (3L), HTN, DM, HLD who presents to the ED for fever.   Patient recently at [facility]  for CHF exacerbation, then had a TAVR performed on 9/30.  Was in the CCU for 8 days afterwards.  Reports no complications from the procedure. Febrile to 101 in ED   labs reveal mild leukocytosis and elevated creatinine.    CXR (I personally reviewed) with vascular congestion and cannot rule out infiltrate   groin site doesnt appear to be infected  UA is positive though unclear if he has urinary symptoms   high concern of bacteremia which could quite serious he has a device and new aortic valve   Zosyn not the best choice in a patient with HFrEF and who just bounced back from a CHF exacerbation as there is a high sodium load therefore have changed antibiotics to cefepime   He received a dose of vancomycin, VT 6.0, no longer needs vancomycin at this time     10/17: + blood cultures with klebsiella likely from urinary source given CT findings and he reports some urinary symptoms, will await sensitivities and adjust antibiotics accordingly   10/8: no fevers today, no new cbc, legionella negative, prelim repeat blood cultures with no growth, UC pending, cefepime was changed to ceftriaxone.     Klebsiella Bacteremia   Leukocytosis   s/p TAVR   DM  DENISHA    Plan:  start ceftriaxone 2 grams IV q 24 hrs  no longer needs vancomycin   MRSA pcr not detected   legionella negative   f/u blood and urine cultures   trend wbc  trend creatinine   good but not too tight glycemic control, HbA1c 7.7, ISS and management per medicine

## 2021-10-18 NOTE — PROVIDER CONTACT NOTE (CRITICAL VALUE NOTIFICATION) - SITUATION
Preliminary results for blood culture collected on 10/15/2021 - growth in aerobic bottles gram (-) rods
Blood culture 10/15 growth in aerobic bottle gram negative rods (preliminary)
made aware of above lab result.

## 2021-10-18 NOTE — PROGRESS NOTE ADULT - ASSESSMENT
75M with a PMH of HFrEF s/p ICD on xarelto, COPD on home O2 (3L), HTN, DM, HLD who presents to the ED for fever.  S/p recent hospitalization at [facility] s/p TAVR. CT A/p showed Thickening of the urinary bladder walls despite underdistention with infiltration of the perivesicular fat concerning for cystitis. Trace complex fluid in the right paracolic gutter extending up to the right inguinal canal with infiltrative changes in the right inguinal region.  10/16 blood cultures positive for gram negative rods.      # Acute Cystitis - continue Abx.  Recent hospitalization, on IV Abx.  F/u cultures.  CT findings of ? trace complex fluid extending up R inguinal canal.  No tenderness / findings on exam.  Monitor for now.    # Gram negative Bacteremia - repeat cultures requested.  s/p TAVR, has ICD.  Will d/w ID regarding further workup.    # Sepsis - due to above.  Improved  # DENISHA, CKD III - Cr improving.  No hydronephrosis on CT.  Baseline Cr ~ 1.5-2 in 9/2021.  Monitor BMP.    # Severe COPD - on O2.  no SOB / complaints.  # EMBER - stable on BIPAP.    # Chronic Hypoxic Respiratory Failure - pt reports on 2-3L of O2 at home, presently baseline.  # Chronic Systolic CHF - Appears to be euvolemic.  Will Hold diuretics for now.   # Essential hypertension. BP stable.  # Diabetes Type II - monitor fingersticks.  Insulin coverage for hyperglycemia.  # Inpatient DVT Proph - on anticoagulation     Plan of care d/w pt's wife at bedside.

## 2021-10-18 NOTE — PROGRESS NOTE ADULT - SUBJECTIVE AND OBJECTIVE BOX
[facility] NEPHROLOGY SERVICES, [facility]  NEPHROLOGY AND HYPERTENSION  [address]  [address]  [place] [address]  [phone]    MD DANIEL GARRETT MD ANDREY GONCHARUK, MD MADHU KORRAPATI, MD YELENA ROSENBERG, MD BINNY KOSHY, MD CHRISTOPHER CAPUTO, MD POOJA GLORIA MD          Patient events noted  No distress;    MEDICATIONS  (STANDING):  aspirin enteric coated 81 milliGRAM(s) Oral daily  budesonide 160 MICROgram(s)/formoterol 4.5 MICROgram(s) Inhaler 2 Puff(s) Inhalation two times a day  cefepime   IVPB 2000 milliGRAM(s) IV Intermittent every 24 hours  dextrose 40% Gel 15 Gram(s) Oral once  dextrose 5%. 1000 milliLiter(s) (50 mL/Hr) IV Continuous <Continuous>  dextrose 5%. 1000 milliLiter(s) (100 mL/Hr) IV Continuous <Continuous>  dextrose 50% Injectable 25 Gram(s) IV Push once  dextrose 50% Injectable 12.5 Gram(s) IV Push once  dextrose 50% Injectable 25 Gram(s) IV Push once  furosemide    Tablet 40 milliGRAM(s) Oral daily  glucagon  Injectable 1 milliGRAM(s) IntraMuscular once  insulin lispro (ADMELOG) corrective regimen sliding scale   SubCutaneous three times a day before meals  rivaroxaban 15 milliGRAM(s) Oral daily  simvastatin 40 milliGRAM(s) Oral at bedtime  tiotropium 18 MICROgram(s) Capsule 1 Capsule(s) Inhalation daily    MEDICATIONS  (PRN):  acetaminophen   Tablet .. 650 milliGRAM(s) Oral every 6 hours PRN Temp greater or equal to 38C (100.4F), Moderate Pain (4 - 6)  ALBUTerol    90 MICROgram(s) HFA Inhaler 2 Puff(s) Inhalation every 6 hours PRN Shortness of Breath and/or Wheezing      10-17-21 @ 07:01  -  10-18-21 @ 07:00  --------------------------------------------------------  IN: 810 mL / OUT: 1300 mL / NET: -490 mL      PHYSICAL EXAM:      T(C): 37.7 (10-18-21 @ 11:59), Max: 38.1 (10-17-21 @ 15:45)  HR: 67 (10-18-21 @ 11:59) (67 - 82)  BP: 114/64 (10-18-21 @ 11:59) (113/69 - 124/80)  RR: 17 (10-18-21 @ 11:59) (17 - 20)  SpO2: 100% (10-18-21 @ 11:59) (94% - 100%)  Wt(kg): --  Lungs clear  Heart S1S2  Abd soft NT ND  Extremities:   tr edema                                    9.4    8.74  )-----------( 118      ( 17 Oct 2021 07:13 )             29.2     10-17    132<L>  |  93<L>  |  64<H>  ----------------------------<  215<H>  3.6   |  31  |  2.40<H>    Ca    9.1      17 Oct 2021 07:13    TPro  6.9  /  Alb  2.4<L>  /  TBili  0.7  /  DBili  x   /  AST  70<H>  /  ALT  48  /  AlkPhos  164<H>  10-17      LIVER FUNCTIONS - ( 17 Oct 2021 07:13 )  Alb: 2.4 g/dL / Pro: 6.9 gm/dL / ALK PHOS: 164 U/L / ALT: 48 U/L / AST: 70 U/L / GGT: x           Creatinine Trend: 2.40<--, 2.69<--, 2.98<--      Assessment   DENISHA CKD 3; CRS warranted diuresis   Indices stable    Plan:  Continue supportive care.       Jon Rowe MD

## 2021-10-18 NOTE — PROVIDER CONTACT NOTE (CRITICAL VALUE NOTIFICATION) - ACTION/TREATMENT ORDERED:
Patient was on zosyn IV antibiotics changed to Cefepime, no further orders received.
Patient was on zosyn IV order changed to Cefepime, no further orders received
pt already on IV antibiotic., no further orders given.

## 2021-10-18 NOTE — PROGRESS NOTE ADULT - SUBJECTIVE AND OBJECTIVE BOX
JO VARELA  MRN-[id number]    Interval History: The pt was seen and examined earlier, no distress, no new complains. The pt is afebrile today, no new cbc.    PAST MEDICAL & SURGICAL HISTORY:  HTN (hypertension)    DM (diabetes mellitus), type 2    High cholesterol    CHF (congestive heart failure)    Pneumonia    COPD (chronic obstructive pulmonary disease)    Pacemaker    AICD (automatic cardioverter/defibrillator) present    S/P CABG x 3  cabg3  in 2003    S/P cardiac pacemaker procedure  defibrilator 2012    S/P cardiac cath  cardiac stentin 2011    S/P hernia repair  hernia [id number]        ROS:    [ ] Unobtainable because:  [x ] All other systems negative    Constitutional: no fever, no chills  Head: no trauma  Eyes: no vision changes, no eye pain  ENT:  no sore throat, no rhinorrhea  Cardiovascular:  no chest pain, no palpitation  Respiratory:  no SOB, no cough  GI:  no abd pain, no vomiting, no diarrhea  urinary: no dysuria, no hematuria, no flank pain  musculoskeletal:  no joint pain, no joint swelling  skin:  no rash  neurology:  no headache, no seizure, no change in mental status  psych: no anxiety, no depression         Allergies  No Known Allergies        ANTIMICROBIALS:  cefTRIAXone   IVPB 2000 every 24 hours      OTHER MEDS:  acetaminophen   Tablet .. 650 milliGRAM(s) Oral every 6 hours PRN  ALBUTerol    90 MICROgram(s) HFA Inhaler 2 Puff(s) Inhalation every 6 hours PRN  aspirin enteric coated 81 milliGRAM(s) Oral daily  budesonide 160 MICROgram(s)/formoterol 4.5 MICROgram(s) Inhaler 2 Puff(s) Inhalation two times a day  dextrose 40% Gel 15 Gram(s) Oral once  dextrose 5%. 1000 milliLiter(s) IV Continuous <Continuous>  dextrose 5%. 1000 milliLiter(s) IV Continuous <Continuous>  dextrose 50% Injectable 25 Gram(s) IV Push once  dextrose 50% Injectable 12.5 Gram(s) IV Push once  dextrose 50% Injectable 25 Gram(s) IV Push once  glucagon  Injectable 1 milliGRAM(s) IntraMuscular once  insulin lispro (ADMELOG) corrective regimen sliding scale   SubCutaneous three times a day before meals  rivaroxaban 15 milliGRAM(s) Oral daily  simvastatin 40 milliGRAM(s) Oral at bedtime  tiotropium 18 MICROgram(s) Capsule 1 Capsule(s) Inhalation daily      Vital Signs Last 24 Hrs  T(C): 37.7 (18 Oct 2021 11:59), Max: 37.7 (18 Oct 2021 11:59)  T(F): 99.8 (18 Oct 2021 11:59), Max: 99.8 (18 Oct 2021 11:59)  HR: 67 (18 Oct 2021 11:59) (67 - 82)  BP: 114/64 (18 Oct 2021 11:59) (113/69 - 124/80)  BP(mean): --  RR: 17 (18 Oct 2021 11:59) (17 - 20)  SpO2: 100% (18 Oct 2021 11:59) (94% - 100%)    Physical Exam:  Constitutional: non-toxic, no distress, on nasal cannula   HEAD/EYES: anicteric, no conjunctival injection  ENT:  supple  Cardiovascular:   normal S1, S2, faint murmur, no edema, left chest wall device palpable  Respiratory:  crackles  bilaterally, no wheezes  GI:  soft, non-tender, normal bowel sounds  :  no ro, no CVA tenderness, groin site where tavr was done with Steri-strips and healing well with no surrounding erythema though slight firmness over the right groin  Musculoskeletal:  no synovitis, normal ROM  Neurologic: awake and alert, normal strength, no focal findings  Skin:  no rash, no erythema, no phlebitis  Heme/Onc: no lymphadenopathy   Psychiatric:  awake, alert, appropriate mood    WBC Count: 8.74 K/uL (10-17 @ 07:13)  WBC Count: 11.86 K/uL (10-16 @ 07:56)  WBC Count: 14.13 K/uL (10-15 @ 23:14)                            9.4    8.74  )-----------( 118      ( 17 Oct 2021 07:13 )             29.2       10-17    132<L>  |  93<L>  |  64<H>  ----------------------------<  215<H>  3.6   |  31  |  2.40<H>    Ca    9.1      17 Oct 2021 07:13    TPro  6.9  /  Alb  2.4<L>  /  TBili  0.7  /  DBili  x   /  AST  70<H>  /  ALT  48  /  AlkPhos  164<H>  10-17          Creatinine Trend: 2.40<--, 2.69<--, 2.98<--      MICROBIOLOGY:  v  .Blood Blood  10-17-21   No growth to date.  --  --      .Blood Blood-Peripheral  10-16-21   Growth in aerobic and anaerobic bottles: Klebsiella pneumoniae  See previous culture [id number]  --  Blood Culture PCR  Klebsiella pneumoniae      Clean Catch Clean Catch (Midstream)  10-16-21   >100,000 CFU/ml Gram Negative Rods  --  --      SARS-CoV-2: Mary (11 Jul 2021 02:51)    RADIOLOGY:     JO VARELA  MRN-[id number]    Interval History: The pt was seen and examined earlier, no distress, no new complains. The pt is afebrile today, no new cbc.    PAST MEDICAL & SURGICAL HISTORY:  HTN (hypertension)    DM (diabetes mellitus), type 2    High cholesterol    CHF (congestive heart failure)    Pneumonia    COPD (chronic obstructive pulmonary disease)    Pacemaker    AICD (automatic cardioverter/defibrillator) present    S/P CABG x 3  cabg3  in 2003    S/P cardiac pacemaker procedure  defibrilator 2012    S/P cardiac cath  cardiac stentin 2011    S/P hernia repair  hernia [id number]        ROS:    [ ] Unobtainable because:  [x ] All other systems negative    Constitutional: no fever, no chills  Head: no trauma  Eyes: no vision changes, no eye pain  ENT:  no sore throat, no rhinorrhea  Cardiovascular:  no chest pain, no palpitation  Respiratory:  no SOB, no cough  GI:  no abd pain, no vomiting, no diarrhea  urinary: no dysuria, no hematuria, no flank pain  musculoskeletal:  no joint pain, no joint swelling  skin:  no rash  neurology:  no headache, no seizure, no change in mental status  psych: no anxiety, no depression         Allergies  No Known Allergies        ANTIMICROBIALS:  cefTRIAXone   IVPB 2000 every 24 hours      OTHER MEDS:  acetaminophen   Tablet .. 650 milliGRAM(s) Oral every 6 hours PRN  ALBUTerol    90 MICROgram(s) HFA Inhaler 2 Puff(s) Inhalation every 6 hours PRN  aspirin enteric coated 81 milliGRAM(s) Oral daily  budesonide 160 MICROgram(s)/formoterol 4.5 MICROgram(s) Inhaler 2 Puff(s) Inhalation two times a day  dextrose 40% Gel 15 Gram(s) Oral once  dextrose 5%. 1000 milliLiter(s) IV Continuous <Continuous>  dextrose 5%. 1000 milliLiter(s) IV Continuous <Continuous>  dextrose 50% Injectable 25 Gram(s) IV Push once  dextrose 50% Injectable 12.5 Gram(s) IV Push once  dextrose 50% Injectable 25 Gram(s) IV Push once  glucagon  Injectable 1 milliGRAM(s) IntraMuscular once  insulin lispro (ADMELOG) corrective regimen sliding scale   SubCutaneous three times a day before meals  rivaroxaban 15 milliGRAM(s) Oral daily  simvastatin 40 milliGRAM(s) Oral at bedtime  tiotropium 18 MICROgram(s) Capsule 1 Capsule(s) Inhalation daily      Vital Signs Last 24 Hrs  T(C): 37.7 (18 Oct 2021 11:59), Max: 37.7 (18 Oct 2021 11:59)  T(F): 99.8 (18 Oct 2021 11:59), Max: 99.8 (18 Oct 2021 11:59)  HR: 67 (18 Oct 2021 11:59) (67 - 82)  BP: 114/64 (18 Oct 2021 11:59) (113/69 - 124/80)  BP(mean): --  RR: 17 (18 Oct 2021 11:59) (17 - 20)  SpO2: 100% (18 Oct 2021 11:59) (94% - 100%)    Physical Exam:  Constitutional: non-toxic, no distress, on nasal cannula   HEAD/EYES: anicteric, no conjunctival injection  ENT:  supple  Cardiovascular:   normal S1, S2, faint murmur, no edema, left chest wall device palpable  Respiratory:  crackles  bilaterally, no wheezes  GI:  soft, non-tender, normal bowel sounds  :  no ro, no CVA tenderness, groin site where tavr was done with Steri-strips and healing well with no surrounding erythema though slight firmness over the right groin  Musculoskeletal:  no synovitis, normal ROM  Neurologic: awake and alert, normal strength, no focal findings  Skin:  no rash, no erythema, no phlebitis  Heme/Onc: no lymphadenopathy   Psychiatric:  awake, alert, appropriate mood    WBC Count: 8.74 K/uL (10-17 @ 07:13)  WBC Count: 11.86 K/uL (10-16 @ 07:56)  WBC Count: 14.13 K/uL (10-15 @ 23:14)                            9.4    8.74  )-----------( 118      ( 17 Oct 2021 07:13 )             29.2       10-17    132<L>  |  93<L>  |  64<H>  ----------------------------<  215<H>  3.6   |  31  |  2.40<H>    Ca    9.1      17 Oct 2021 07:13    TPro  6.9  /  Alb  2.4<L>  /  TBili  0.7  /  DBili  x   /  AST  70<H>  /  ALT  48  /  AlkPhos  164<H>  10-17          Creatinine Trend: 2.40<--, 2.69<--, 2.98<--      MICROBIOLOGY:  v  .Blood Blood  10-17-21   No growth to date.  --  --      .Blood Blood-Peripheral  10-16-21   Growth in aerobic and anaerobic bottles: Klebsiella pneumoniae  See previous culture [id number]  --  Blood Culture PCR  Klebsiella pneumoniae      Clean Catch Clean Catch (Midstream)  10-16-21   >100,000 CFU/ml Gram Negative Rods  --  --      SARS-CoV-2: Mary (11 Jul 2021 02:51)    RADIOLOGY:  no new imaging

## 2021-10-19 LAB
-  AMIKACIN: SIGNIFICANT CHANGE UP
-  AMOXICILLIN/CLAVULANIC ACID: SIGNIFICANT CHANGE UP
-  AMPICILLIN/SULBACTAM: SIGNIFICANT CHANGE UP
-  AMPICILLIN: SIGNIFICANT CHANGE UP
-  AZTREONAM: SIGNIFICANT CHANGE UP
-  CEFAZOLIN: SIGNIFICANT CHANGE UP
-  CEFEPIME: SIGNIFICANT CHANGE UP
-  CEFOXITIN: SIGNIFICANT CHANGE UP
-  CEFTRIAXONE: SIGNIFICANT CHANGE UP
-  CIPROFLOXACIN: SIGNIFICANT CHANGE UP
-  ERTAPENEM: SIGNIFICANT CHANGE UP
-  GENTAMICIN: SIGNIFICANT CHANGE UP
-  IMIPENEM: SIGNIFICANT CHANGE UP
-  LEVOFLOXACIN: SIGNIFICANT CHANGE UP
-  MEROPENEM: SIGNIFICANT CHANGE UP
-  NITROFURANTOIN: SIGNIFICANT CHANGE UP
-  PIPERACILLIN/TAZOBACTAM: SIGNIFICANT CHANGE UP
-  TIGECYCLINE: SIGNIFICANT CHANGE UP
-  TOBRAMYCIN: SIGNIFICANT CHANGE UP
-  TRIMETHOPRIM/SULFAMETHOXAZOLE: SIGNIFICANT CHANGE UP
CULTURE RESULTS: SIGNIFICANT CHANGE UP
CULTURE RESULTS: SIGNIFICANT CHANGE UP
GLUCOSE BLDC GLUCOMTR-MCNC: 162 MG/DL — HIGH (ref 70–99)
GLUCOSE BLDC GLUCOMTR-MCNC: 174 MG/DL — HIGH (ref 70–99)
GLUCOSE BLDC GLUCOMTR-MCNC: 242 MG/DL — HIGH (ref 70–99)
GLUCOSE BLDC GLUCOMTR-MCNC: 308 MG/DL — HIGH (ref 70–99)
GRAM STN FLD: SIGNIFICANT CHANGE UP
METHOD TYPE: SIGNIFICANT CHANGE UP
ORGANISM # SPEC MICROSCOPIC CNT: SIGNIFICANT CHANGE UP
SPECIMEN SOURCE: SIGNIFICANT CHANGE UP
SPECIMEN SOURCE: SIGNIFICANT CHANGE UP

## 2021-10-19 PROCEDURE — 99232 SBSQ HOSP IP/OBS MODERATE 35: CPT

## 2021-10-19 RX ORDER — CEFPODOXIME PROXETIL 100 MG
200 TABLET ORAL EVERY 12 HOURS
Refills: 0 | Status: DISCONTINUED | OUTPATIENT
Start: 2021-10-20 | End: 2021-10-20

## 2021-10-19 RX ADMIN — SIMVASTATIN 40 MILLIGRAM(S): 20 TABLET, FILM COATED ORAL at 21:40

## 2021-10-19 RX ADMIN — Medication 650 MILLIGRAM(S): at 21:45

## 2021-10-19 RX ADMIN — Medication 1: at 08:15

## 2021-10-19 RX ADMIN — Medication 4: at 16:31

## 2021-10-19 RX ADMIN — Medication 81 MILLIGRAM(S): at 11:24

## 2021-10-19 RX ADMIN — RIVAROXABAN 15 MILLIGRAM(S): KIT at 11:24

## 2021-10-19 RX ADMIN — CEFTRIAXONE 100 MILLIGRAM(S): 500 INJECTION, POWDER, FOR SOLUTION INTRAMUSCULAR; INTRAVENOUS at 06:05

## 2021-10-19 RX ADMIN — Medication 2: at 11:45

## 2021-10-19 RX ADMIN — Medication 650 MILLIGRAM(S): at 22:45

## 2021-10-19 NOTE — PROGRESS NOTE ADULT - SUBJECTIVE AND OBJECTIVE BOX
[facility] NEPHROLOGY SERVICES, [facility]  NEPHROLOGY AND HYPERTENSION  [address]  [address]  [place] [address]  [phone]    MD DANIEL GARRETT MD ANDREY GONCHARUK, MD MADHU KORRAPATI, MD YELENA ROSENBERG, MD BINNY KOSHY, MD CHRISTOPHER CAPUTO, MD POOJA GLORIA MD          Patient events noted feels better wife at bedside    MEDICATIONS  (STANDING):  aspirin enteric coated 81 milliGRAM(s) Oral daily  budesonide 160 MICROgram(s)/formoterol 4.5 MICROgram(s) Inhaler 2 Puff(s) Inhalation two times a day  cefpodoxime 200 milliGRAM(s) Oral every 12 hours  dextrose 40% Gel 15 Gram(s) Oral once  dextrose 5%. 1000 milliLiter(s) (50 mL/Hr) IV Continuous <Continuous>  dextrose 5%. 1000 milliLiter(s) (100 mL/Hr) IV Continuous <Continuous>  dextrose 50% Injectable 25 Gram(s) IV Push once  dextrose 50% Injectable 12.5 Gram(s) IV Push once  dextrose 50% Injectable 25 Gram(s) IV Push once  glucagon  Injectable 1 milliGRAM(s) IntraMuscular once  insulin lispro (ADMELOG) corrective regimen sliding scale   SubCutaneous three times a day before meals  rivaroxaban 15 milliGRAM(s) Oral daily  simvastatin 40 milliGRAM(s) Oral at bedtime  tiotropium 18 MICROgram(s) Capsule 1 Capsule(s) Inhalation daily    MEDICATIONS  (PRN):  acetaminophen   Tablet .. 650 milliGRAM(s) Oral every 6 hours PRN Temp greater or equal to 38C (100.4F), Moderate Pain (4 - 6)  ALBUTerol    90 MICROgram(s) HFA Inhaler 2 Puff(s) Inhalation every 6 hours PRN Shortness of Breath and/or Wheezing      10-19-21 @ 07:01  -  10-20-21 @ 07:00  --------------------------------------------------------  IN: 1160 mL / OUT: 1500 mL / NET: -340 mL      PHYSICAL EXAM:      T(C): 36.7 (10-20-21 @ 06:25), Max: 36.9 (10-19-21 @ 23:10)  HR: 70 (10-20-21 @ 08:13) (70 - 78)  BP: 149/84 (10-20-21 @ 06:25) (129/72 - 172/79)  RR: 17 (10-20-21 @ 08:13) (17 - 18)  SpO2: 94% (10-20-21 @ 08:13) (94% - 100%)  Wt(kg): --  Lungs clear  Heart S1S2  Abd soft NT ND  Extremities:   tr edema                                    9.3    6.56  )-----------( 135      ( 20 Oct 2021 06:55 )             29.2     10-20    137  |  97  |  42<H>  ----------------------------<  183<H>  3.5   |  33<H>  |  1.41<H>    Ca    9.2      20 Oct 2021 06:55          Creatinine Trend: 1.41<--, 2.40<--, 2.69<--, 2.98<--    Assessment   DENISHA CKD 3; CRS warranted diuresis   Indices stable    Plan:  Resume maintenance diuretic rx  Continue supportive care.         Jon Rowe MD

## 2021-10-19 NOTE — PROGRESS NOTE ADULT - ATTENDING COMMENTS
getting better  continue IV antibiotics   may be able to discharge on PO antibiotics soon     D/W Dr. Thomsa Guadalupe, DO  Infectious Disease Attending  Pager [phone]  After 5pm/weekends please call [phone] for all inquiries and new consults
admitted for GN bacteremia which has since resolved   stop ceftriaxone 2 grams IV q 24 hrs  start Vantin 200 mg po bid - will need 10 days course from negative blood cultures - 10/26/2021 last dose    Discussed with Dr. Thomas Guadalupe DO  Infectious Disease Attending  Pager [phone]  After 5pm/weekends please call [phone] for all inquiries and new consults

## 2021-10-19 NOTE — PHYSICAL THERAPY INITIAL EVALUATION ADULT - DID THE PATIENT HAVE SURGERY?
This is the hx of MARTA. a 77 y/o male patient who was admitted to [facility] due to complications of Fever, UTI affecting medical condition and with subsequent affection on functional mobility./n/a

## 2021-10-19 NOTE — PHYSICAL THERAPY INITIAL EVALUATION ADULT - ADDITIONAL COMMENTS
As per pt, pt lives with wife in a PH with 5 stairs with B/L HR's to enter, inside pt has two more steps with b/l HR's. he can stay on the first floor, however pt's room is located on the second floor, there are 15 stairs with bilateral rails to go to his bed room, He was independent in all functional mobility without any AD, PTA, He has home O2 2-4L/min he uses occasionally. Was given rollator.

## 2021-10-19 NOTE — PROGRESS NOTE ADULT - ASSESSMENT
75M with a PMH of HFrEF s/p ICD on xarelto, COPD on home O2 (3L), HTN, DM, HLD who presents to the ED for fever.   Patient recently at [facility]  for CHF exacerbation, then had a TAVR performed on 9/30.  Was in the CCU for 8 days afterwards.  Reports no complications from the procedure. Febrile to 101 in ED   labs reveal mild leukocytosis and elevated creatinine.    CXR (I personally reviewed) with vascular congestion and cannot rule out infiltrate   groin site doesnt appear to be infected  UA is positive though unclear if he has urinary symptoms   high concern of bacteremia which could quite serious he has a device and new aortic valve   Zosyn not the best choice in a patient with HFrEF and who just bounced back from a CHF exacerbation as there is a high sodium load therefore have changed antibiotics to cefepime   He received a dose of vancomycin, VT 6.0, no longer needs vancomycin at this time     10/17: + blood cultures with klebsiella likely from urinary source given CT findings and he reports some urinary symptoms, will await sensitivities and adjust antibiotics accordingly   10/18: no fevers today, no new cbc, legionella negative, prelim repeat blood cultures with no growth, UC pending, cefepime was changed to ceftriaxone.   Attending addendum:  getting better  continue IV antibiotics   may be able to discharge on PO antibiotics soon     10/19: feeling better, no fevers, no new cbc, repeat blood cultures remain with no growth, initial blood and urine cultures grew same organism - Klebsiella. The pt will need 10 days course of abx from negative blood cultures - will change abx to po vantin to complete 10 days course.     Klebsiella Bacteremia   Leukocytosis   s/p TAVR   DM  DENISHA    Plan:  stop ceftriaxone 2 grams IV q 24 hrs  start Vantin 200 mg po bid - will need 10 days course from negative blood cultures - 10/26/2021 last dose  MRSA pcr not detected   legionella negative   follow all culture data  trend wbc  trend creatinine   good but not too tight glycemic control, HbA1c 7.7, ISS and management per medicine     Discussed with Dr. Guadalupe  Discussed with Dr. Guzman 75M with a PMH of HFrEF s/p ICD on xarelto, COPD on home O2 (3L), HTN, DM, HLD who presents to the ED for fever.   Patient recently at [facility]  for CHF exacerbation, then had a TAVR performed on 9/30.  Was in the CCU for 8 days afterwards.  Reports no complications from the procedure. Febrile to 101 in ED   labs reveal mild leukocytosis and elevated creatinine.    CXR (I personally reviewed) with vascular congestion and cannot rule out infiltrate   groin site doesnt appear to be infected  UA is positive though unclear if he has urinary symptoms   high concern of bacteremia which could quite serious he has a device and new aortic valve   Zosyn not the best choice in a patient with HFrEF and who just bounced back from a CHF exacerbation as there is a high sodium load therefore have changed antibiotics to cefepime   He received a dose of vancomycin, VT 6.0, no longer needs vancomycin at this time     10/17: + blood cultures with klebsiella likely from urinary source given CT findings and he reports some urinary symptoms, will await sensitivities and adjust antibiotics accordingly   10/18: no fevers today, no new cbc, legionella negative, prelim repeat blood cultures with no growth, UC pending, cefepime was changed to ceftriaxone.   Attending addendum:  getting better  continue IV antibiotics   may be able to discharge on PO antibiotics soon     10/19: feeling better, no fevers, no new cbc, repeat blood cultures remain with no growth, initial blood and urine cultures grew same organism - Klebsiella. The pt will need 10 days course of abx from negative blood cultures - will change abx to po vantin to complete 10 days course.     Klebsiella Bacteremia   Leukocytosis   s/p TAVR   DM  DENISHA    Plan:  stop ceftriaxone 2 grams IV q 24 hrs  start Vantin 200 mg po bid - will need 10 days course from negative blood cultures - 10/26/2021 last dose  MRSA pcr not detected   legionella negative   follow all culture data  trend wbc  trend creatinine   good but not too tight glycemic control, HbA1c 7.7, ISS and management per medicine     will sign off, re-consult as needed     Discussed with Dr. Guadalupe  Discussed with Dr. Guzman

## 2021-10-19 NOTE — PROGRESS NOTE ADULT - SUBJECTIVE AND OBJECTIVE BOX
JO VARELA  MRN-[id number]    Interval History: the pt was seen and examined earlier, no distress, no new complains, out of bed to chair, remains on NC. The pt is afebrile, no new cbc.     PAST MEDICAL & SURGICAL HISTORY:  HTN (hypertension)    DM (diabetes mellitus), type 2    High cholesterol    CHF (congestive heart failure)    Pneumonia    COPD (chronic obstructive pulmonary disease)    Pacemaker    AICD (automatic cardioverter/defibrillator) present    S/P CABG x 3  cabg3  in 2003    S/P cardiac pacemaker procedure  defibrilator 2012    S/P cardiac cath  cardiac stentin 2011    S/P hernia repair  hernia [id number]        ROS:    [ ] Unobtainable because:  [x ] All other systems negative    Constitutional: no fever, no chills  Head: no trauma  Eyes: no vision changes, no eye pain  ENT:  no sore throat, no rhinorrhea  Cardiovascular:  no chest pain, no palpitation  Respiratory:  no SOB, no cough  GI:  no abd pain, no vomiting, no diarrhea  urinary: no dysuria, no hematuria, no flank pain  musculoskeletal:  no joint pain, no joint swelling  skin:  no rash  neurology:  no headache, no seizure, no change in mental status  psych: no anxiety, no depression         Allergies  No Known Allergies        ANTIMICROBIALS:  cefTRIAXone   IVPB 2000 every 24 hours      OTHER MEDS:  acetaminophen   Tablet .. 650 milliGRAM(s) Oral every 6 hours PRN  ALBUTerol    90 MICROgram(s) HFA Inhaler 2 Puff(s) Inhalation every 6 hours PRN  aspirin enteric coated 81 milliGRAM(s) Oral daily  budesonide 160 MICROgram(s)/formoterol 4.5 MICROgram(s) Inhaler 2 Puff(s) Inhalation two times a day  dextrose 40% Gel 15 Gram(s) Oral once  dextrose 5%. 1000 milliLiter(s) IV Continuous <Continuous>  dextrose 5%. 1000 milliLiter(s) IV Continuous <Continuous>  dextrose 50% Injectable 25 Gram(s) IV Push once  dextrose 50% Injectable 12.5 Gram(s) IV Push once  dextrose 50% Injectable 25 Gram(s) IV Push once  glucagon  Injectable 1 milliGRAM(s) IntraMuscular once  insulin lispro (ADMELOG) corrective regimen sliding scale   SubCutaneous three times a day before meals  rivaroxaban 15 milliGRAM(s) Oral daily  simvastatin 40 milliGRAM(s) Oral at bedtime  tiotropium 18 MICROgram(s) Capsule 1 Capsule(s) Inhalation daily      Vital Signs Last 24 Hrs  T(C): 36.7 (19 Oct 2021 11:24), Max: 37.4 (18 Oct 2021 17:52)  T(F): 98 (19 Oct 2021 11:24), Max: 99.3 (18 Oct 2021 17:52)  HR: 72 (19 Oct 2021 11:24) (70 - 81)  BP: 129/72 (19 Oct 2021 11:24) (123/76 - 137/86)  BP(mean): --  RR: 17 (19 Oct 2021 11:24) (17 - 18)  SpO2: 100% (19 Oct 2021 11:24) (96% - 100%)    Physical Exam:  Constitutional: non-toxic, no distress, on nasal cannula   HEAD/EYES: anicteric, no conjunctival injection  ENT:  supple  Cardiovascular:   normal S1, S2, faint murmur, no edema, left chest wall device palpable  Respiratory:  diminished breath sounds bilaterally, no wheezes  GI:  soft, non-tender, normal bowel sounds  :  no ro, no CVA tenderness, groin site where tavr was done with Steri-strips and healing well with no surrounding erythema though slight firmness over the right groin  Musculoskeletal:  no synovitis, normal ROM  Neurologic: awake and alert, normal strength, no focal findings  Skin:  no rash, no erythema, no phlebitis  Heme/Onc: no lymphadenopathy   Psychiatric:  awake, alert, appropriate mood    WBC Count: 8.74 K/uL (10-17 @ 07:13)  WBC Count: 11.86 K/uL (10-16 @ 07:56)  WBC Count: 14.13 K/uL (10-15 @ 23:14)    Creatinine Trend: 2.40<--, 2.69<--, 2.98<--      MICROBIOLOGY:  v  .Blood Blood  10-17-21   No growth to date.  --  --      .Blood Blood-Peripheral  10-16-21   Growth in aerobic and anaerobic bottles: Klebsiella pneumoniae  See previous culture [id number]  --  Blood Culture PCR  Klebsiella pneumoniae      Clean Catch Clean Catch (Midstream)  10-16-21   >100,000 CFU/ml Klebsiella pneumoniae  --  --    SARS-CoV-2: NotDete (11 Jul 2021 02:51)    RADIOLOGY:

## 2021-10-19 NOTE — PHYSICAL THERAPY INITIAL EVALUATION ADULT - PERTINENT HX OF CURRENT PROBLEM, REHAB EVAL
This is the hx of HLOS. a 75 y/o male patient who was admitted to [facility] due to complications of Fever, UTI affecting medical condition and with subsequent affection on functional mobility

## 2021-10-19 NOTE — PROGRESS NOTE ADULT - ASSESSMENT
75M with a PMH of HFrEF s/p ICD on xarelto, COPD on home O2 (3L), HTN, DM, HLD who presents to the ED for fever.  S/p recent hospitalization at [facility] s/p TAVR. CT A/p showed Thickening of the urinary bladder walls despite underdistention with infiltration of the perivesicular fat concerning for cystitis. Trace complex fluid in the right paracolic gutter extending up to the right inguinal canal with infiltrative changes in the right inguinal region.  10/16 blood cultures positive for gram negative rods.      # Acute Cystitis - continue Abx.  Recent hospitalization, on IV Abx.  F/u cultures.  CT findings of ? trace complex fluid extending up R inguinal canal.  No tenderness / findings on exam.  Monitor for now.    # Klebsiella Gram negative Bacteremia - repeat cultures negative.  s/p TAVR, has ICD.  Change to po Vantin.   # Sepsis - due to above.  Improved  # DENISHA, CKD III - Cr improving.  No hydronephrosis on CT.  Baseline Cr ~ 1.5-2 in 9/2021.  Monitor BMP.    # Severe COPD - on O2.  no SOB / complaints.  # EMBER - stable on BIPAP.    # Chronic Hypoxic Respiratory Failure - pt reports on 2-3L of O2 at home, presently baseline.  # Chronic Systolic CHF - Appears to be euvolemic.  Will Hold diuretics for now.   # Essential hypertension. BP stable.  # Diabetes Type II - monitor fingersticks.  Insulin coverage for hyperglycemia.  # Inpatient DVT Proph - on anticoagulation     Plan of care d/w pt's wife at bedside.    CHIARA vs C discussed., pending PT input  Plan d/c in am.

## 2021-10-19 NOTE — PROGRESS NOTE ADULT - SUBJECTIVE AND OBJECTIVE BOX
Patient: JO VARELA [id number] 76y Male                            Hospitalist Attending Note    Seen on 10/19/21 at approximately 245pm with wife at bedside.  States pt at baseline.  Pt admits to poor ambulation.  Awaiting PT evaluation.  Overall, feeling much better.  No SOB.  No urinary complaints.    ____________________PHYSICAL EXAM:  GENERAL:  NAD, alert and oriented to person, place.   HEENT: NCAT  CARDIOVASCULAR:  S1, S2  LUNGS: CTAB  ABDOMEN:  soft, (-) tenderness,  (-) distension, (+) bowel sounds, (-) guarding, (-) rebound (-) rigidity  EXTREMITIES:  no cyanosis / clubbing / edema.   ____________________      -242  /84 P 71 R 18 T 98.1

## 2021-10-20 ENCOUNTER — TRANSCRIPTION ENCOUNTER (OUTPATIENT)
Age: 76
End: 2021-10-20

## 2021-10-20 VITALS
OXYGEN SATURATION: 100 % | DIASTOLIC BLOOD PRESSURE: 75 MMHG | HEART RATE: 70 BPM | SYSTOLIC BLOOD PRESSURE: 126 MMHG | RESPIRATION RATE: 18 BRPM | TEMPERATURE: 98 F

## 2021-10-20 DIAGNOSIS — N18.30 CHRONIC KIDNEY DISEASE, STAGE 3 UNSPECIFIED: ICD-10-CM

## 2021-10-20 LAB
ANION GAP SERPL CALC-SCNC: 7 MMOL/L — SIGNIFICANT CHANGE UP (ref 5–17)
BUN SERPL-MCNC: 42 MG/DL — HIGH (ref 7–23)
CALCIUM SERPL-MCNC: 9.2 MG/DL — SIGNIFICANT CHANGE UP (ref 8.5–10.1)
CHLORIDE SERPL-SCNC: 97 MMOL/L — SIGNIFICANT CHANGE UP (ref 96–108)
CO2 SERPL-SCNC: 33 MMOL/L — HIGH (ref 22–31)
CREAT SERPL-MCNC: 1.41 MG/DL — HIGH (ref 0.5–1.3)
GLUCOSE BLDC GLUCOMTR-MCNC: 180 MG/DL — HIGH (ref 70–99)
GLUCOSE BLDC GLUCOMTR-MCNC: 225 MG/DL — HIGH (ref 70–99)
GLUCOSE SERPL-MCNC: 183 MG/DL — HIGH (ref 70–99)
HCT VFR BLD CALC: 29.2 % — LOW (ref 39–50)
HGB BLD-MCNC: 9.3 G/DL — LOW (ref 13–17)
MCHC RBC-ENTMCNC: 30.5 PG — SIGNIFICANT CHANGE UP (ref 27–34)
MCHC RBC-ENTMCNC: 31.8 GM/DL — LOW (ref 32–36)
MCV RBC AUTO: 95.7 FL — SIGNIFICANT CHANGE UP (ref 80–100)
NRBC # BLD: 0 /100 WBCS — SIGNIFICANT CHANGE UP (ref 0–0)
PLATELET # BLD AUTO: 135 K/UL — LOW (ref 150–400)
POTASSIUM SERPL-MCNC: 3.5 MMOL/L — SIGNIFICANT CHANGE UP (ref 3.5–5.3)
POTASSIUM SERPL-SCNC: 3.5 MMOL/L — SIGNIFICANT CHANGE UP (ref 3.5–5.3)
RBC # BLD: 3.05 M/UL — LOW (ref 4.2–5.8)
RBC # FLD: 15.4 % — HIGH (ref 10.3–14.5)
SODIUM SERPL-SCNC: 137 MMOL/L — SIGNIFICANT CHANGE UP (ref 135–145)
WBC # BLD: 6.56 K/UL — SIGNIFICANT CHANGE UP (ref 3.8–10.5)
WBC # FLD AUTO: 6.56 K/UL — SIGNIFICANT CHANGE UP (ref 3.8–10.5)

## 2021-10-20 PROCEDURE — 99239 HOSP IP/OBS DSCHRG MGMT >30: CPT

## 2021-10-20 RX ORDER — INSULIN GLARGINE 100 [IU]/ML
20 INJECTION, SOLUTION SUBCUTANEOUS
Qty: 0 | Refills: 0 | DISCHARGE

## 2021-10-20 RX ORDER — CEFPODOXIME PROXETIL 100 MG
1 TABLET ORAL
Qty: 13 | Refills: 0
Start: 2021-10-20

## 2021-10-20 RX ORDER — BUMETANIDE 0.25 MG/ML
2 INJECTION INTRAMUSCULAR; INTRAVENOUS DAILY
Refills: 0 | Status: DISCONTINUED | OUTPATIENT
Start: 2021-10-20 | End: 2021-10-20

## 2021-10-20 RX ORDER — INSULIN GLARGINE 100 [IU]/ML
53 INJECTION, SOLUTION SUBCUTANEOUS
Qty: 0 | Refills: 0 | DISCHARGE

## 2021-10-20 RX ADMIN — Medication 2: at 11:19

## 2021-10-20 RX ADMIN — RIVAROXABAN 15 MILLIGRAM(S): KIT at 11:19

## 2021-10-20 RX ADMIN — Medication 81 MILLIGRAM(S): at 11:19

## 2021-10-20 RX ADMIN — Medication 1: at 08:09

## 2021-10-20 RX ADMIN — BUMETANIDE 2 MILLIGRAM(S): 0.25 INJECTION INTRAMUSCULAR; INTRAVENOUS at 14:26

## 2021-10-20 RX ADMIN — Medication 200 MILLIGRAM(S): at 05:28

## 2021-10-20 NOTE — DISCHARGE NOTE NURSING/CASE MANAGEMENT/SOCIAL WORK - PATIENT PORTAL LINK FT
You can access the FollowMyHealth Patient Portal offered by [facility] by registering at the following website: [email or web address]. By joining [facility]’s FollowMyHealth portal, you will also be able to view your health information using other applications (apps) compatible with our system.

## 2021-10-20 NOTE — PROGRESS NOTE ADULT - PROVIDER SPECIALTY LIST ADULT
Hospitalist
Hospitalist
Infectious Disease
Nephrology
Nephrology
Hospitalist
Infectious Disease
Infectious Disease

## 2021-10-20 NOTE — PROGRESS NOTE ADULT - ASSESSMENT
75M with a PMH of HFrEF s/p ICD on xarelto, COPD on home O2 (3L), HTN, DM, HLD who presents to the ED for fever.  S/p recent hospitalization at [facility] s/p TAVR. CT A/p showed Thickening of the urinary bladder walls despite underdistention with infiltration of the perivesicular fat concerning for cystitis. Trace complex fluid in the right paracolic gutter extending up to the right inguinal canal with infiltrative changes in the right inguinal region.  10/16 blood cultures positive for gram negative rods.      # Acute Cystitis - continue Abx.  CT findings of ? trace complex fluid extending up R inguinal canal.  No tenderness / findings on exam.  Antibiotics per ID.     # Klebsiella Gram negative Bacteremia - repeat cultures negative.  s/p TAVR, has ICD.  Change to po Vantin.   # Sepsis - due to above.  Improved  # DENISHA, CKD III - Cr improving.  No hydronephrosis on CT.  Baseline Cr ~ 1.5-2 in 9/2021.  Monitor BMP.    # Severe COPD - on O2.  no SOB / complaints.  # EMBER - stable on BIPAP.    # Chronic Hypoxic Respiratory Failure - pt reports on 2-3L of O2 at home, presently baseline.  # Chronic Systolic CHF - Appears to be euvolemic.  Will Hold diuretics for now.   # Essential hypertension. BP stable.  # Diabetes Type II - monitor fingersticks.  Insulin coverage for hyperglycemia.  # Inpatient DVT Proph - on anticoagulation     Stable for d/c [facility] 75M with a PMH of HFrEF s/p ICD on xarelto, COPD on home O2 (3L), HTN, DM, HLD who presents to the ED for fever.  S/p recent hospitalization at [facility] s/p TAVR. CT A/p showed Thickening of the urinary bladder walls despite underdistention with infiltration of the perivesicular fat concerning for cystitis. Trace complex fluid in the right paracolic gutter extending up to the right inguinal canal with infiltrative changes in the right inguinal region.  10/16 blood cultures positive for gram negative rods.      # Acute Cystitis - continue Abx.  CT findings of ? trace complex fluid extending up R inguinal canal.  No tenderness / findings on exam.  Antibiotics per ID.     # Klebsiella Gram negative Bacteremia - repeat cultures negative.  s/p TAVR, has ICD.  Change to po Vantin.   # Sepsis - due to above.  Improved  # DENISHA, CKD III - Cr improving.  No hydronephrosis on CT.  Baseline Cr ~ 1.5-2 in 9/2021.  Monitor BMP.  Cr now at baseline.   # Severe COPD - on O2.  no SOB / complaints.  # EMBER - stable on BIPAP.    # Chronic Hypoxic Respiratory Failure - pt reports on 2-3L of O2 at home, presently baseline.  # Chronic Systolic CHF - Appears to be euvolemic.  Will Hold diuretics for now.   # Essential hypertension. BP stable.  Resume Metoprolol as outpatient.  Will d/c hydralazine from home meds.   # Diabetes Type II - monitor fingersticks.  Insulin coverage for hyperglycemia.  FS have been reasonably controlled.  Will decrease dose of long acting insulin when pt resumes at home.   # Inpatient DVT Proph - on anticoagulation     Stable for d/c [facility]

## 2021-10-20 NOTE — DISCHARGE NOTE PROVIDER - HOSPITAL COURSE
75M with a PMH of HFrEF s/p ICD on xarelto, COPD on home O2 (3L), HTN, DM, HLD who presents to the ED for fever.  S/p recent hospitalization at [facility] s/p TAVR. CT A/p showed Thickening of the urinary bladder walls despite underdistention with infiltration of the perivesicular fat concerning for cystitis. Trace complex fluid in the right paracolic gutter extending up to the right inguinal canal with infiltrative changes in the right inguinal region.  10/16 blood cultures positive for gram negative rods.      # Acute Cystitis - continue Abx.  CT findings of ? trace complex fluid extending up R inguinal canal.  No tenderness / findings on exam.  Antibiotics per ID.     # Klebsiella Gram negative Bacteremia - repeat cultures negative.  s/p TAVR, has ICD.  Change to po Vantin.   # Sepsis - due to above.  Improved  # DENISHA, CKD III - Cr improving.  No hydronephrosis on CT.  Baseline Cr ~ 1.5-2 in 9/2021.  Monitor BMP.  Cr now at baseline.   # Severe COPD - on O2.  no SOB / complaints.  # EMBER - stable on BIPAP.    # Chronic Hypoxic Respiratory Failure - pt reports on 2-3L of O2 at home, presently baseline.  # Chronic Systolic CHF - Appears to be euvolemic.  Will Hold diuretics for now.   # Essential hypertension. BP stable.  Resume Metoprolol as outpatient.  Will d/c hydralazine from home meds.   # Diabetes Type II - monitor fingersticks.  Insulin coverage for hyperglycemia.  FS have been reasonably controlled.  Will decrease dose of long acting insulin when pt resumes at home.   # Inpatient DVT Proph - on anticoagulation     Stable for d/c [facility]

## 2021-10-20 NOTE — DISCHARGE NOTE NURSING/CASE MANAGEMENT/SOCIAL WORK - NSDCVIVACCINE_GEN_ALL_CORE_FT
influenza, injectable, quadrivalent, preservative free; 10-Sep-2021 16:41; Gwen Terrell (RN); [employer]; [id number] (Exp. Date: 30-Jun-2022); IntraMuscular; Deltoid Right.; 0.5 milliLiter(s); VIS (VIS Published: 15-Aug-2019, VIS Presented: 10-Sep-2021);

## 2021-10-20 NOTE — DISCHARGE NOTE PROVIDER - CARE PROVIDER_API CALL
Jon Rowe  INTERNAL MEDICINE  [address] [facility] [address] [facility], [address] [place] [id number]  Phone: [phone]  Fax: [phone]  Follow Up Time:

## 2021-10-20 NOTE — DISCHARGE NOTE PROVIDER - NSDCFUADDINST_GEN_ALL_CORE_FT
What to expect when you have contrast    During your exam, we will inject  contrast  into your vein or artery. (Contrast is a clear liquid with iodine in it. It shows up on X-rays.)    You may feel warm or hot. You may have a metal taste in your mouth and a slight upset stomach. You may also feel pressure near the kidneys and bladder. These effects will last about 1 to 3 minutes.    Please tell us if you have:    Sneezing     Itching    Hives     Swelling in the face    A hoarse voice    Breathing problems    Other new symptoms    Serious problems are rare.  They may include:    Irregular heartbeat     Seizures    Kidney failure              Tissue damage    Shock      Death    If you have any problems during the exam, we  will treat them right away.    When you get home    Call your hospital if you have any new symptoms in the next 2 days, like hives or swelling. (Phone numbers are at the bottom of this page.) Or call your family doctor.     If you have wheezing or trouble breathing, call 911.    Self-care  -Drink at least 4 extra glasses of water today.   This reduces the stress on your kidneys.  -Keep taking your regular medicines.    The contrast will pass out of your body in your  Urine(pee). This will happen in the next 24 hours. You  will not feel this. Your urine will not  change color.    If you have kidney problems or take metformin    Drink 4 to 8 large glasses of water for the next  2 days, if you are not on a fluid restriction.    ?If you take metformin (Glucophage or Glucovance) for diabetes, keep taking it.      ?Your kidney function tests are abnormal.  If you take Metformin, do not take it for 48 hours. Please go to your clinic for a blood test within 3 days after your exam before the restarting this medicine.     (Note to provider:please give patient prescription for lab tests.)    ?Special instructions:     I have read and understand the above information.    Patient Sign  Here:______________________________________Date:________Time:______    Staff Sign Here:________________________________________Date:_______Time:______      Radiology Departments:     ?[facility]: [phone] ?[place]: [phone]     ?[place]: [phone] ?[place]:[phone]      ?[place]: [phone]  ?[place]: [phone]  ?[place]:[phone]    ?[facility]:[phone]  ?[facility]:[phone]   It is important to see your primary physician as well as the physicians noted below within the next week to perform a comprehensive medical review.  Call their offices for an appointment as soon as you leave the hospital.  You will also need to see them for renewal of your medications.  If you do not have a primary physician, contact the [facility] Physician Referral Service at [phone].  To obtain your results, you can access the [facility] Patient Portal at [email or web address].  Your medical issues appear to be stable at this time, but if your symptoms recur or worsen, contact your physicians and/or return to the hospital if necessary.  If you encounter any issues or questions with your medication, call your physicians before stopping the medication.  Do not drive.  Limit your diet to 2 grams of sodium daily.

## 2021-10-20 NOTE — PROGRESS NOTE ADULT - REASON FOR ADMISSION
fever, sepsis

## 2021-10-20 NOTE — DISCHARGE NOTE PROVIDER - NSDCMRMEDTOKEN_GEN_ALL_CORE_FT
aspirin 81 mg oral delayed release tablet: 1 tab(s) orally once a day  budesonide-formoterol 160 mcg-4.5 mcg/inh inhalation aerosol: 2 puff(s) inhaled 2 times a day   cefpodoxime 200 mg oral tablet: 1 tab(s) orally every 12 hours  ipratropium-albuterol 0.5 mg-2.5 mg/3 mL inhalation solution: 3 milliliter(s) by nebulizer every 6 hours, As Needed -for shortness of breath and/or wheezing   Lasix 40 mg oral tablet: 1 tab(s) orally 2 times a day   metoprolol succinate 50 mg oral tablet, extended release: 1 tab(s) orally once a day  rivaroxaban 15 mg oral tablet: 1 tab(s) orally once a day (before a meal)  simvastatin 40 mg oral tablet: 1 tab(s) orally once a day (at bedtime)  tamsulosin 0.4 mg oral capsule: 1 cap(s) orally once a day (at bedtime)  Toujeo SoloStar 300 units/mL subcutaneous solution: 20  subcutaneous once a day (at bedtime)  Trulicity Pen 1.5 mg/0.5 mL subcutaneous solution: once a week  Ventolin HFA 90 mcg/inh inhalation aerosol: 2 puff(s) inhaled every 6 hours, As Needed sob/wheezing

## 2021-10-20 NOTE — PROGRESS NOTE ADULT - SUBJECTIVE AND OBJECTIVE BOX
Patient: JO VARELA [id number] 76y Male                            Hospitalist Attending Note    No complaints.  No fever / chills.      ____________________PHYSICAL EXAM:  GENERAL:  NAD, alert and oriented to person, place.   HEENT: NCAT  CARDIOVASCULAR:  S1, S2  LUNGS: CTAB  ABDOMEN:  soft, (-) tenderness,  (-) distension, (+) bowel sounds, (-) guarding, (-) rebound (-) rigidity  EXTREMITIES:  no cyanosis / clubbing / edema.   ____________________      VITALS:  Vital Signs Last 24 Hrs  T(C): 36.7 (20 Oct 2021 06:25), Max: 36.9 (19 Oct 2021 23:10)  T(F): 98.1 (20 Oct 2021 06:25), Max: 98.5 (19 Oct 2021 23:10)  HR: 71 (20 Oct 2021 08:20) (70 - 78)  BP: 149/84 (20 Oct 2021 06:25) (129/72 - 172/79)  BP(mean): --  RR: 17 (20 Oct 2021 08:13) (17 - 18)  SpO2: 95% (20 Oct 2021 08:20) (94% - 100%) Daily     Daily Weight in k (20 Oct 2021 06:25)  CAPILLARY BLOOD GLUCOSE      POCT Blood Glucose.: 180 mg/dL (20 Oct 2021 07:33)  POCT Blood Glucose.: 174 mg/dL (19 Oct 2021 21:23)  POCT Blood Glucose.: 308 mg/dL (19 Oct 2021 15:46)  POCT Blood Glucose.: 242 mg/dL (19 Oct 2021 11:34)    I&O's Summary    19 Oct 2021 07:01  -  20 Oct 2021 07:00  --------------------------------------------------------  IN: 1160 mL / OUT: 1500 mL / NET: -340 mL        LABS:                        9.3    6.56  )-----------( 135      ( 20 Oct 2021 06:55 )             29.2     10-20    137  |  97  |  42<H>  ----------------------------<  183<H>  3.5   |  33<H>  |  1.41<H>    Ca    9.2      20 Oct 2021 06:55                  Culture - Blood (collected 17 Oct 2021 14:22)  Source: .Blood Blood  Preliminary Report (18 Oct 2021 15:01):    No growth to date.    Culture - Blood (collected 17 Oct 2021 14:22)  Source: .Blood Blood  Preliminary Report (18 Oct 2021 15:01):    No growth to date.        MEDICATIONS:  acetaminophen   Tablet .. 650 milliGRAM(s) Oral every 6 hours PRN  ALBUTerol    90 MICROgram(s) HFA Inhaler 2 Puff(s) Inhalation every 6 hours PRN  aspirin enteric coated 81 milliGRAM(s) Oral daily  budesonide 160 MICROgram(s)/formoterol 4.5 MICROgram(s) Inhaler 2 Puff(s) Inhalation two times a day  buMETAnide 2 milliGRAM(s) Oral daily  cefpodoxime 200 milliGRAM(s) Oral every 12 hours  dextrose 40% Gel 15 Gram(s) Oral once  dextrose 5%. 1000 milliLiter(s) IV Continuous <Continuous>  dextrose 5%. 1000 milliLiter(s) IV Continuous <Continuous>  dextrose 50% Injectable 25 Gram(s) IV Push once  dextrose 50% Injectable 12.5 Gram(s) IV Push once  dextrose 50% Injectable 25 Gram(s) IV Push once  glucagon  Injectable 1 milliGRAM(s) IntraMuscular once  insulin lispro (ADMELOG) corrective regimen sliding scale   SubCutaneous three times a day before meals  rivaroxaban 15 milliGRAM(s) Oral daily  simvastatin 40 milliGRAM(s) Oral at bedtime  tiotropium 18 MICROgram(s) Capsule 1 Capsule(s) Inhalation daily

## 2021-10-20 NOTE — DISCHARGE NOTE PROVIDER - NSDCCPCAREPLAN_GEN_ALL_CORE_FT
PRINCIPAL DISCHARGE DIAGNOSIS  Diagnosis: Bacteremia due to Klebsiella pneumoniae  Assessment and Plan of Treatment:       SECONDARY DISCHARGE DIAGNOSES  Diagnosis: Chronic systolic congestive heart failure  Assessment and Plan of Treatment:     Diagnosis: UTI (urinary tract infection)  Assessment and Plan of Treatment:     Diagnosis: Bacteremia due to Klebsiella pneumoniae  Assessment and Plan of Treatment:     Diagnosis: DENISHA (acute kidney injury)  Assessment and Plan of Treatment:     Diagnosis: COPD, severe  Assessment and Plan of Treatment:     Diagnosis: Stage 3 chronic kidney disease  Assessment and Plan of Treatment:     Diagnosis: UTI due to Klebsiella species  Assessment and Plan of Treatment:

## 2021-12-21 NOTE — PROGRESS NOTE ADULT - SUBJECTIVE AND OBJECTIVE BOX
Last four GAD7 Assessments       GAD7 12/21/2021 12/9/2020   GAD7 Score 6 3   Feeling nervous, anxious or on edge Several days Several days   Not being able to stop or control worrying Several days Not at all   Worrying too much about different things Several days Several days   Trouble relaxing Several days Several days   Being so restless that it's hard to sit still Several days Not at all   Becoming easily annoyed or irritable Several days Not at all   Feeling afraid as if something awful might happen Not at all Not at all   Ability to handle work, home and other people Not difficult at all -     Recent PHQ 2/9 Score    PHQ 2:  Date Adult PHQ 2 Score Adult PHQ 2 Interpretation   12/21/2021 0 No further screening needed       PHQ 9:  Date Adult PHQ 9 Score Adult PHQ 9 Interpretation   12/21/2021 6 Mild Depression        74 years old male by ems with cpap c/o respiratory distress. EMS sts pt's blood pressure was very high, received nitro spray x 4 aspirin 162 mg. Pt is alert and oriented but unable to speaking in clear full sentences unable to give detail hx (08 Dec 2019 11:01)      Chief Complaint:  Patient is a 74y old  Male who presents with a chief complaint of sob (08 Dec 2019 20:14)      Review of Systems:    General:  No wt loss, fevers, chills, night sweats  Eyes:  Good vision, no reported pain  ENT:  No sore throat, pain, runny nose, dysphagia  CV:  No pain, palpitations, hypo/hypertension  Resp:  dyspnea, cough, tachypnea, wheezing  GI:  No pain, nausea, vomiting, diarrhea, constipation         Social History/Family History  SOCHX:   tobacco,  -  alcohol    FMHX: FA/MO  - contributory       Discussed with:  PMD, Family    Physical Exam:    Vital Signs:  Vital Signs Last 24 Hrs  T(C): 36.1 (08 Dec 2019 16:51), Max: 37 (08 Dec 2019 08:05)  T(F): 97 (08 Dec 2019 16:51), Max: 98.6 (08 Dec 2019 08:05)  HR: 69 (08 Dec 2019 16:55) (60 - 69)  BP: 134/67 (08 Dec 2019 16:51) (134/67 - 162/77)  BP(mean): --  RR: 18 (08 Dec 2019 16:51) (18 - 33)  SpO2: 98% (08 Dec 2019 16:55) (96% - 100%)  Daily Height in cm: 177.8 (08 Dec 2019 13:38)    Daily   I&O's Summary    08 Dec 2019 07:01  -  08 Dec 2019 21:24  --------------------------------------------------------  IN: 0 mL / OUT: 600 mL / NET: -600 mL          Chest:  decreased bases BL  Cardiovascular:  Regular rhythm, S1, S2, no murmur/rub/S3/S4, no carotid/femoral/abdominal bruit,   Abdomen:  Soft, non-tender, non-distended, normoactive bowel sounds, no HSM      Laboratory:                          13.1   20.94 )-----------( 182      ( 08 Dec 2019 08:20 )             40.7     12-08    141  |  104  |  41<H>  ----------------------------<  306<H>  4.1   |  27  |  1.91<H>    Ca    8.8      08 Dec 2019 09:13    TPro  7.9  /  Alb  3.9  /  TBili  0.6  /  DBili  x   /  AST  65<H>  /  ALT  62  /  AlkPhos  130<H>  12-08    ABG - ( 08 Dec 2019 08:46 )  pH, Arterial: x     pH, Blood: 7.31  /  pCO2: 49    /  pO2: 240   / HCO3: 24    / Base Excess: -1.9  /  SaO2: 99                CARDIAC MARKERS ( 08 Dec 2019 15:58 )  .468 ng/mL / x     / x     / x     / 4.5 ng/mL  CARDIAC MARKERS ( 08 Dec 2019 09:13 )  .167 ng/mL / x     / 94 U/L / x     / 2.6 ng/mL      CAPILLARY BLOOD GLUCOSE      POCT Blood Glucose.: 162 mg/dL (08 Dec 2019 16:14)  POCT Blood Glucose.: 218 mg/dL (08 Dec 2019 12:01)  POCT Blood Glucose.: 320 mg/dL (08 Dec 2019 08:00)    LIVER FUNCTIONS - ( 08 Dec 2019 09:13 )  Alb: 3.9 g/dL / Pro: 7.9 gm/dL / ALK PHOS: 130 U/L / ALT: 62 U/L / AST: 65 U/L / GGT: x           PT/INR - ( 08 Dec 2019 08:20 )   PT: 24.7 sec;   INR: 2.15 ratio         PTT - ( 08 Dec 2019 08:20 )  PTT:30.2 sec      Assessment:  asked to assess this well-known patient with shortness of breath  The patient has a known significant cardiac history with chronic congestive heart failure and an implantable automatic defibrillator  The patient remains on CPAP after a pulmonary consultation  Pulmonary management continues  Diuresis continues  Previous echoes,  one being performed in January 2018 revealed similar findings  A repeat diagnostic echocardiogram is completed and reveals similar findings in previous  Mildly leaked troponin levels and a renal insufficient patient was not likely to be myocardial infarction, but more likely demand ischemia

## 2022-01-05 NOTE — ED PROVIDER NOTE - CARDIOVASCULAR NEGATIVE STATEMENT, MLM
1) Continue to take robitussin as instructed   2) Take prescription medication as instructed  3) Follow up with your primary care doctor/pediatrician  4) Return to the ER for worsening or concerning symptoms
no chest pain and no edema.

## 2022-01-24 NOTE — DISCHARGE NOTE PROVIDER - PROVIDER TOKENS
PROVIDER:[TOKEN:[[id number]],FOLLOWUP:[1 week]],PROVIDER:[TOKEN:[[id number]],FOLLOWUP:[2 weeks]],PROVIDER:[TOKEN:[[id number]],FOLLOWUP:[2 weeks]]
Detail Level: Generalized
Products Recommended: Cerave mineral sunscreen, Vanicream moisturizing ointment, and Vanicream HC twice a day for 3-4 days only.
General Sunscreen Counseling: I recommended a broad spectrum sunscreen with a SPF of 30 or higher. I explained that SPF 30 sunscreens block approximately 97 percent of the sun's harmful rays. Sunscreens should be applied at least 15 minutes prior to expected sun exposure and then every 2 hours after that as long as sun exposure continues. If swimming or exercising sunscreen should be reapplied every 45 minutes to an hour after getting wet or sweating. One ounce, or the equivalent of a shot glass full of sunscreen, is adequate to protect the skin not covered by a bathing suit. I also recommended a lip balm with a sunscreen as well. Sun protective clothing can be used in lieu of sunscreen but must be worn the entire time you are exposed to the sun's rays.

## 2022-03-11 NOTE — PROGRESS NOTE ADULT - PROBLEM/PLAN-2
A/P: 33yo POD#1 s/p LTCS complicated by chorioamnionitis.  Patient is stable and doing well post-operatively.      #Chorioamnionitis   - Afebrile since delivery   - s/p A/G/T     #PP care   - Continue regular diet.  - Increase ambulation.  - Continue motrin, tylenol, oxycodone PRN for pain control  - F/u AM CBC    JANINA Salinas MD  PGY-1  A/P: 32y POD#2 s/p LTCS c/b chorio s/p A/G/T. Patient is progressing appropriately post-operatively   - Continue regular diet.  - Encourage ambulation  - Continue motrin, tylenol, oxycodone PRN for pain control    #Chorio s/p A/G/T  - Patient continues to be afebrile and not tachycardic     Ricky Gonzalez, PGY-1  Obstetrics and Gynecology DISPLAY PLAN FREE TEXT

## 2022-07-11 NOTE — ED PROVIDER NOTE - PMH
STANDARD NOTE    Name: Pedrito Bruner  Age: 37year old  Gender: female      Chief Complaint   Patient presents with   â¢ Physical     Patient presents for a check up. Due for mammogram and would like a colonoscopy. She is thinking that she might like to increase her Prozac. She has been a little more anxious. The Prozac has worked for her in the past.  She hasn't been using her Qvar on a regular basis. She has not been needing her albuterol on a regular basis. Current Outpatient Medications   Medication Sig Dispense Refill   â¢ fluoxetine (PROzac) 20 MG tablet Take 1 tablet by mouth daily. 30 tablet 11   â¢ lansoprazole (PREVACID) 30 MG capsule Take 1 capsule by mouth once daily 90 capsule 1   â¢ ALPRAZolam (XANAX) 0.5 MG tablet TAKE 1 TABLET BY MOUTH NIGHTLY AS NEEDED FOR SLEEP 30 tablet 0   â¢ ibuprofen (MOTRIN) 800 MG tablet Take 1 tablet by mouth 3 times daily as needed for Pain. Take with food. 30 tablet 0   â¢ albuterol (ProAir HFA) 108 (90 Base) MCG/ACT inhaler Inhale 2 puffs into the lungs every 4 hours as needed for Shortness of Breath or Wheezing. 1 each 2   â¢ cetirizine (ZyrTEC Allergy) 10 MG tablet Take 1 tablet by mouth daily for 14 days. 14 tablet 0   â¢ Cyanocobalamin (B-12) 1000 MCG Tab      â¢ Cholecalciferol 50 mcg (2,000 units) capsule      â¢ Calcium Carbonate Antacid (CALCIUM ANTACID PO)      â¢ ELDERBERRY PO      â¢ Thiamine HCl (vitamin B-1) 250 MG tablet Take 250 mg by mouth daily. â¢ levonorgestrel (MIRENA) (52 MG) 20 MCG/DAY intrauterine device by Intrauterine route 1 time. â¢ Multiple Vitamins-Minerals (MULTIVITAMIN ADULT PO)      â¢ QVAR REDIHALER 80 MCG/ACT inhaler TAKE 2 PUFFS BY MOUTH TWICE A DAY 10.6 g 11     No current facility-administered medications for this visit.        ALLERGIES:  Doxycycline, Erythromycin, Penicillins, and Terconazole    Past Medical History:   Diagnosis Date   â¢ Asthma        Past Surgical History:   Procedure Laterality Date   â¢ Breast reduction "surgery  2000   â¢ Gallbladder surgery  2010   â¢ Gastric bypass  2014        Family History   Problem Relation Age of Onset   â¢ Hypertension Mother    â¢ Asthma Mother    â¢ Cancer Mother         skin and cervical    â¢ Leukemia Mother    â¢ Hypertension Father    â¢ COPD Father    â¢ Diabetes Father    â¢ Heart disease Father    â¢ Cancer, Breast Maternal Grandmother         unsure of diagnosis age. Social History     Socioeconomic History   â¢ Marital status: /Civil Union     Spouse name: Not on file   â¢ Number of children: Not on file   â¢ Years of education: Not on file   â¢ Highest education level: Not on file   Occupational History   â¢ Not on file   Tobacco Use   â¢ Smoking status: Never Smoker   â¢ Smokeless tobacco: Never Used   Vaping Use   â¢ Vaping Use: never used   Substance and Sexual Activity   â¢ Alcohol use: Yes   â¢ Drug use: Never   â¢ Sexual activity: Yes     Partners: Male     Birth control/protection: I.U.D. Other Topics Concern   â¢ Not on file   Social History Narrative   â¢ Not on file     Social Determinants of Health     Financial Resource Strain: Not on file   Food Insecurity: Not on file   Transportation Needs: Not on file   Physical Activity: Not on file   Stress: Not on file   Social Connections: Not on file   Intimate Partner Violence: Not At Risk   â¢ Social Determinants: Intimate Partner Violence Past Fear: No   â¢ Social Determinants: Intimate Partner Violence Current Fear: No         Review of Systems   Constitutional: Negative. Respiratory: Negative. Psychiatric/Behavioral: The patient is nervous/anxious. Visit Vitals  Temp 97.9 Â°F (36.6 Â°C) (Tympanic)   Resp 18   Ht 5' 7"" (1.702 m)   Wt 118.3 kg (260 lb 14.6 oz)   BMI 40.86 kg/mÂ²       Physical Exam  Constitutional:       General: She is not in acute distress. Appearance: Normal appearance.    HENT:      Right Ear: Tympanic membrane and ear canal normal.      Left Ear: Tympanic membrane and ear canal normal. " Cardiovascular:      Rate and Rhythm: Normal rate and regular rhythm. Pulmonary:      Effort: Pulmonary effort is normal. No respiratory distress. Breath sounds: Normal breath sounds. No wheezing. Neurological:      Mental Status: She is alert and oriented to person, place, and time. Psychiatric:         Mood and Affect: Mood normal.       Assessment and Plan  Encounter for screening mammogram for malignant neoplasm of breast  - MAMMO SCREENING BILATERAL W [facility]; Future    Colon cancer screening  - SERVICE TO GASTROENTEROLOGY    Mild intermittent asthma, unspecified whether complicated    Anxiety  - fluoxetine (PROzac) 20 MG tablet; Take 1 tablet by mouth daily. Dispense: 30 tablet; Refill: 11    Refer to orders. Follow up as directed. She voiced understanding of and agreement with the above plan. AICD (automatic cardioverter/defibrillator) present    CHF (congestive heart failure)    COPD (chronic obstructive pulmonary disease)    DM (diabetes mellitus), type 2    High cholesterol    HTN (hypertension)    Pacemaker    Pneumonia

## 2023-02-03 NOTE — DISCHARGE NOTE ADULT - NSTOBACCOSMKCESSPRO_GEN_A_CS
Called patient regarding scheduling over due mammogram but she states she has a broken knee cap and does not want to schedule at this time. ,
Referred to [facility] for Tobacco Control...

## 2023-02-06 NOTE — DISCHARGE NOTE PROVIDER - NSDCCPCAREPLAN_GEN_ALL_CORE_FT
PRINCIPAL DISCHARGE DIAGNOSIS  Diagnosis: CHF (congestive heart failure)  Assessment and Plan of Treatment: stable, continue Lasix 40 mg po daily, follow up with PMD in one week      SECONDARY DISCHARGE DIAGNOSES  Diagnosis: Chronic obstructive pulmonary disease, unspecified COPD type  Assessment and Plan of Treatment: continue home medications    Diagnosis: Pneumonia  Assessment and Plan of Treatment:
(1) Female

## 2023-02-21 NOTE — PATIENT PROFILE ADULT - NSPROHMDIABETBLDGLCTARGET_GEN_A_NUR
CONST: no fevers, no chills  EYES: no pain, no vision changes  ENT: no sore throat, no ear pain, no change in hearing, +foreign body sensation  CV: no chest pain, no leg swelling  RESP: no shortness of breath, no cough  ABD: no abdominal pain, no nausea, no vomiting, no diarrhea  : no dysuria, no flank pain, no hematuria  MSK: no back pain, no extremity pain  NEURO: no headache or additional neurologic complaints  HEME: no easy bleeding  SKIN:  no rash known

## 2023-03-30 NOTE — ED PROVIDER NOTE - FAMILY HISTORY
SAAD: [facility] on [facility] de-identified] Home No pertinent family history in first degree relatives

## 2023-06-30 NOTE — PATIENT PROFILE ADULT - LAST TOBACCO USE (DD-MM-YY)
Topical Clindamycin Counseling: Patient counseled that this medication may cause skin irritation or allergic reactions.  In the event of skin irritation, the patient was advised to reduce the amount of the drug applied or use it less frequently.   The patient verbalized understanding of the proper use and possible adverse effects of clindamycin.  All of the patient's questions and concerns were addressed. 04-Sep-2021 High Dose Vitamin A Pregnancy And Lactation Text: High dose vitamin A therapy is contraindicated during pregnancy and breast feeding. Spironolactone Pregnancy And Lactation Text: This medication can cause feminization of the male fetus and should be avoided during pregnancy. The active metabolite is also found in breast milk. Topical Sulfur Applications Counseling: Topical Sulfur Counseling: Patient counseled that this medication may cause skin irritation or allergic reactions.  In the event of skin irritation, the patient was advised to reduce the amount of the drug applied or use it less frequently.   The patient verbalized understanding of the proper use and possible adverse effects of topical sulfur application.  All of the patient's questions and concerns were addressed. Isotretinoin Pregnancy And Lactation Text: This medication is Pregnancy Category X and is considered extremely dangerous during pregnancy. It is unknown if it is excreted in breast milk. Benzoyl Peroxide Counseling: Patient counseled that medicine may cause skin irritation and bleach clothing.  In the event of skin irritation, the patient was advised to reduce the amount of the drug applied or use it less frequently.   The patient verbalized understanding of the proper use and possible adverse effects of benzoyl peroxide.  All of the patient's questions and concerns were addressed. Dapsone Counseling: I discussed with the patient the risks of dapsone including but not limited to hemolytic anemia, agranulocytosis, rashes, methemoglobinemia, kidney failure, peripheral neuropathy, headaches, GI upset, and liver toxicity.  Patients who start dapsone require monitoring including baseline LFTs and weekly CBCs for the first month, then every month thereafter.  The patient verbalized understanding of the proper use and possible adverse effects of dapsone.  All of the patient's questions and concerns were addressed. Azithromycin Counseling:  I discussed with the patient the risks of azithromycin including but not limited to GI upset, allergic reaction, drug rash, diarrhea, and yeast infections. Minocycline Pregnancy And Lactation Text: This medication is Pregnancy Category D and not consider safe during pregnancy. It is also excreted in breast milk. Tazorac Counseling:  Patient advised that medication is irritating and drying.  Patient may need to apply sparingly and wash off after an hour before eventually leaving it on overnight.  The patient verbalized understanding of the proper use and possible adverse effects of tazorac.  All of the patient's questions and concerns were addressed. Birth Control Pills Counseling: Birth Control Pill Counseling: I discussed with the patient the potential side effects of OCPs including but not limited to increased risk of stroke, heart attack, thrombophlebitis, deep venous thrombosis, hepatic adenomas, breast changes, GI upset, headaches, and depression.  The patient verbalized understanding of the proper use and possible adverse effects of OCPs. All of the patient's questions and concerns were addressed. Bactrim Counseling:  I discussed with the patient the risks of sulfa antibiotics including but not limited to GI upset, allergic reaction, drug rash, diarrhea, dizziness, photosensitivity, and yeast infections.  Rarely, more serious reactions can occur including but not limited to aplastic anemia, agranulocytosis, methemoglobinemia, blood dyscrasias, liver or kidney failure, lung infiltrates or desquamative/blistering drug rashes. Winlevi Pregnancy And Lactation Text: This medication is considered safe during pregnancy and breastfeeding. Include Pregnancy/Lactation Warning?: No Doxycycline Pregnancy And Lactation Text: This medication is Pregnancy Category D and not consider safe during pregnancy. It is also excreted in breast milk but is considered safe for shorter treatment courses. Dapsone Pregnancy And Lactation Text: This medication is Pregnancy Category C and is not considered safe during pregnancy or breast feeding. Azelaic Acid Counseling: Patient counseled that medicine may cause skin irritation and to avoid applying near the eyes.  In the event of skin irritation, the patient was advised to reduce the amount of the drug applied or use it less frequently.   The patient verbalized understanding of the proper use and possible adverse effects of azelaic acid.  All of the patient's questions and concerns were addressed. Topical Retinoid counseling:  Patient advised to apply a pea-sized amount only at bedtime and wait 30 minutes after washing their face before applying.  If too drying, patient may add a non-comedogenic moisturizer. The patient verbalized understanding of the proper use and possible adverse effects of retinoids.  All of the patient's questions and concerns were addressed. Topical Sulfur Applications Pregnancy And Lactation Text: This medication is Pregnancy Category C and has an unknown safety profile during pregnancy. It is unknown if this topical medication is excreted in breast milk. Detail Level: Detailed Tetracycline Counseling: Patient counseled regarding possible photosensitivity and increased risk for sunburn.  Patient instructed to avoid sunlight, if possible.  When exposed to sunlight, patients should wear protective clothing, sunglasses, and sunscreen.  The patient was instructed to call the office immediately if the following severe adverse effects occur:  hearing changes, easy bruising/bleeding, severe headache, or vision changes.  The patient verbalized understanding of the proper use and possible adverse effects of tetracycline.  All of the patient's questions and concerns were addressed. Patient understands to avoid pregnancy while on therapy due to potential birth defects. Erythromycin Pregnancy And Lactation Text: This medication is Pregnancy Category B and is considered safe during pregnancy. It is also excreted in breast milk. Aklief counseling:  Patient advised to apply a pea-sized amount only at bedtime and wait 30 minutes after washing their face before applying.  If too drying, patient may add a non-comedogenic moisturizer.  The most commonly reported side effects including irritation, redness, scaling, dryness, stinging, burning, itching, and increased risk of sunburn.  The patient verbalized understanding of the proper use and possible adverse effects of retinoids.  All of the patient's questions and concerns were addressed. Tazorac Pregnancy And Lactation Text: This medication is not safe during pregnancy. It is unknown if this medication is excreted in breast milk. Minocycline Counseling: Patient advised regarding possible photosensitivity and discoloration of the teeth, skin, lips, tongue and gums.  Patient instructed to avoid sunlight, if possible.  When exposed to sunlight, patients should wear protective clothing, sunglasses, and sunscreen.  The patient was instructed to call the office immediately if the following severe adverse effects occur:  hearing changes, easy bruising/bleeding, severe headache, or vision changes.  The patient verbalized understanding of the proper use and possible adverse effects of minocycline.  All of the patient's questions and concerns were addressed. Spironolactone Counseling: Patient advised regarding risks of diarrhea, abdominal pain, hyperkalemia, birth defects (for female patients), liver toxicity and renal toxicity. The patient may need blood work to monitor liver and kidney function and potassium levels while on therapy. The patient verbalized understanding of the proper use and possible adverse effects of spironolactone.  All of the patient's questions and concerns were addressed. Topical Clindamycin Pregnancy And Lactation Text: This medication is Pregnancy Category B and is considered safe during pregnancy. It is unknown if it is excreted in breast milk. Benzoyl Peroxide Pregnancy And Lactation Text: This medication is Pregnancy Category C. It is unknown if benzoyl peroxide is excreted in breast milk. Birth Control Pills Pregnancy And Lactation Text: This medication should be avoided if pregnant and for the first 30 days post-partum. Topical Retinoid Pregnancy And Lactation Text: This medication is Pregnancy Category C. It is unknown if this medication is excreted in breast milk. Azithromycin Pregnancy And Lactation Text: This medication is considered safe during pregnancy and is also secreted in breast milk. Bactrim Pregnancy And Lactation Text: This medication is Pregnancy Category D and is known to cause fetal risk.  It is also excreted in breast milk. Sarecycline Counseling: Patient advised regarding possible photosensitivity and discoloration of the teeth, skin, lips, tongue and gums.  Patient instructed to avoid sunlight, if possible.  When exposed to sunlight, patients should wear protective clothing, sunglasses, and sunscreen.  The patient was instructed to call the office immediately if the following severe adverse effects occur:  hearing changes, easy bruising/bleeding, severe headache, or vision changes.  The patient verbalized understanding of the proper use and possible adverse effects of sarecycline.  All of the patient's questions and concerns were addressed. Erythromycin Counseling:  I discussed with the patient the risks of erythromycin including but not limited to GI upset, allergic reaction, drug rash, diarrhea, increase in liver enzymes, and yeast infections. Azelaic Acid Pregnancy And Lactation Text: This medication is considered safe during pregnancy and breast feeding. Doxycycline Counseling:  Patient counseled regarding possible photosensitivity and increased risk for sunburn.  Patient instructed to avoid sunlight, if possible.  When exposed to sunlight, patients should wear protective clothing, sunglasses, and sunscreen.  The patient was instructed to call the office immediately if the following severe adverse effects occur:  hearing changes, easy bruising/bleeding, severe headache, or vision changes.  The patient verbalized understanding of the proper use and possible adverse effects of doxycycline.  All of the patient's questions and concerns were addressed. High Dose Vitamin A Counseling: Side effects reviewed, pt to contact office should one occur. Winlevi Counseling:  I discussed with the patient the risks of topical clascoterone including but not limited to erythema, scaling, itching, and stinging. Patient voiced their understanding. Isotretinoin Counseling: Patient should get monthly blood tests, not donate blood, not drive at night if vision affected, not share medication, and not undergo elective surgery for 6 months after tx completed. Side effects reviewed, pt to contact office should one occur. Aklief Pregnancy And Lactation Text: It is unknown if this medication is safe to use during pregnancy.  It is unknown if this medication is excreted in breast milk.  Breastfeeding women should use the topical cream on the smallest area of the skin for the shortest time needed while breastfeeding.  Do not apply to nipple and areola.

## 2023-11-28 ENCOUNTER — APPOINTMENT (OUTPATIENT)
Dept: CT IMAGING | Facility: HOSPITAL | Age: 78
End: 2023-11-28

## 2023-11-28 ENCOUNTER — OUTPATIENT (OUTPATIENT)
Dept: OUTPATIENT SERVICES | Facility: HOSPITAL | Age: 78
LOS: 1 days | Discharge: HOME | End: 2023-11-28
Payer: MEDICARE

## 2023-11-28 DIAGNOSIS — Z98.89 OTHER SPECIFIED POSTPROCEDURAL STATES: Chronic | ICD-10-CM

## 2023-11-28 DIAGNOSIS — Z95.1 PRESENCE OF AORTOCORONARY BYPASS GRAFT: Chronic | ICD-10-CM

## 2023-11-28 DIAGNOSIS — Z95.0 PRESENCE OF CARDIAC PACEMAKER: Chronic | ICD-10-CM

## 2023-11-28 DIAGNOSIS — J98.4 OTHER DISORDERS OF LUNG: ICD-10-CM

## 2023-11-28 PROCEDURE — 71250 CT THORAX DX C-: CPT | Mod: 26
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
## 2024-08-02 NOTE — PROGRESS NOTE ADULT - SUBJECTIVE AND OBJECTIVE BOX
Prep for case entered to schedule left heart catheterization with Dr. Ramesh C Kashinath on 08/23/2024 at [facility]BD by [facility].    H&P completed by Dr. Amanda Melendez on 07/30/2024.    EKG was completed on 07/30/2024. CXR was completed on 07/02/2024.     Pre-op labs are ordered, pt will complete no later than 08/19/2024.     Prep for case was routed to [facility] pool for scheduling and to Dr. Melendez clinical support pool for RN to complete prep for case orders.      JO VARELA  MRN-[id number]      Follow Up:  bacteremia     Interval History: pt seen and examined. reports feeling well and has minimal complaints     ROS:    [ ] Unobtainable because:  [ x] All other systems negative except as noted    Constitutional: no fever, no chills  Head: no trauma  Eyes: no vision changes, no eye pain  ENT:  no sore throat, no rhinorrhea  Cardiovascular:  no chest pain, no palpitation  Respiratory:  no SOB, no cough  GI:  no abd pain, no vomiting, no diarrhea  urinary: +dysuria, no hematuria, no flank pain  musculoskeletal:  no joint pain, no joint swelling  skin:  no rash  neurology:  no headache, no seizure, no change in mental status  psych: no anxiety, no depression         Allergies  No Known Allergies        ANTIMICROBIALS:  cefepime   IVPB 2000 every 24 hours      OTHER MEDS:  acetaminophen   Tablet .. 650 milliGRAM(s) Oral every 6 hours PRN  ALBUTerol    90 MICROgram(s) HFA Inhaler 2 Puff(s) Inhalation every 6 hours PRN  aspirin enteric coated 81 milliGRAM(s) Oral daily  budesonide 160 MICROgram(s)/formoterol 4.5 MICROgram(s) Inhaler 2 Puff(s) Inhalation two times a day  chlorhexidine 0.12% Liquid 15 milliLiter(s) Oral Mucosa every 12 hours  dextrose 40% Gel 15 Gram(s) Oral once  dextrose 5%. 1000 milliLiter(s) IV Continuous <Continuous>  dextrose 5%. 1000 milliLiter(s) IV Continuous <Continuous>  dextrose 50% Injectable 25 Gram(s) IV Push once  dextrose 50% Injectable 12.5 Gram(s) IV Push once  dextrose 50% Injectable 25 Gram(s) IV Push once  furosemide    Tablet 40 milliGRAM(s) Oral daily  glucagon  Injectable 1 milliGRAM(s) IntraMuscular once  insulin lispro (ADMELOG) corrective regimen sliding scale   SubCutaneous three times a day before meals  rivaroxaban 15 milliGRAM(s) Oral daily  simvastatin 40 milliGRAM(s) Oral at bedtime  tiotropium 18 MICROgram(s) Capsule 1 Capsule(s) Inhalation daily      Physical Exam:  Vital Signs Last 24 Hrs  T(C): 37.6 (17 Oct 2021 11:30), Max: 37.8 (17 Oct 2021 05:20)  T(F): 99.7 (17 Oct 2021 11:30), Max: 100 (17 Oct 2021 05:20)  HR: 70 (17 Oct 2021 11:30) (64 - 70)  BP: 128/80 (17 Oct 2021 11:30) (100/64 - 128/80)  BP(mean): --  RR: 19 (17 Oct 2021 11:30) (17 - 19)  SpO2: 100% (17 Oct 2021 11:30) (95% - 100%)    Constitutional: non-toxic, no distress, on nasal cannula   HEAD/EYES: anicteric, no conjunctival injection  ENT:  supple  Cardiovascular:   normal S1, S2, faint murmur, no edema, left chest wall device palpable  Respiratory:  crackles  bilaterally, no wheezes  GI:  soft, non-tender, normal bowel sounds  :  no ro, no CVA tenderness, groin site where tavr was done with steristrops and healing well with no surrounding erythema though slight firmness over the right groin  Musculoskeletal:  no synovitis, normal ROM  Neurologic: awake and alert, normal strength, no focal findings  Skin:  no rash, no erythema, no phlebitis  Heme/Onc: no lymphadenopathy   Psychiatric:  awake, alert, appropriate mood    WBC Count: 8.74 K/uL (10-17 @ 07:13)  WBC Count: 11.86 K/uL (10-16 @ 07:56)  WBC Count: 14.13 K/uL (10-15 @ 23:14)                            9.4    8.74  )-----------( 118      ( 17 Oct 2021 07:13 )             29.2       10-17    132<L>  |  93<L>  |  64<H>  ----------------------------<  215<H>  3.6   |  31  |  2.40<H>    Ca    9.1      17 Oct 2021 07:13    TPro  6.9  /  Alb  2.4<L>  /  TBili  0.7  /  DBili  x   /  AST  70<H>  /  ALT  48  /  AlkPhos  164<H>  1017      Urinalysis Basic - ( 15 Oct 2021 23:23 )    Color: Yellow / Appearance: very cloudy / S.010 / pH: x  Gluc: x / Ketone: Negative  / Bili: Negative / Urobili: Negative mg/dL   Blood: x / Protein: 30 mg/dL / Nitrite: Negative   Leuk Esterase: Moderate / RBC: 3-5 /HPF / WBC 26-50   Sq Epi: x / Non Sq Epi: Occasional / Bacteria: Many        Creatinine Trend: 2.40<--, 2.69<--, 2.98<--  Lactate, Blood: 1.7 mmol/L (10-15-21 @ 23:14)      MICROBIOLOGY:  Vancomycin Level, Random: 6.0 ug/mL (10-17-21 @ 07:13)  v  .Blood Blood-Peripheral  10-16-21   Growth in aerobic bottle: Gram Negative Rods  Growth in anaerobic bottle: Gram Negative Rods  --  Blood Culture PCR    Culture - Blood (10.16.21 @ 12:03)    Gram Stain:   Growth in aerobic bottle: Gram Negative Rods    -  Klebsiella pneumoniae: Detec    Specimen Source: .Blood Blood-Peripheral    Organism: Blood Culture PCR    Culture Results:   Growth in aerobic bottle: Gram Negative Rods  ***Blood Panel PCR results on this specimen are available  approximately 3 hours after the Gram stain result.***  Gram stain, PCR, and/or culture results may not always  correspond due to difference in methodologies.  ************************************************************  This PCR assay was performed by multiplex PCR. This  Assay tests for 66 bacterial and resistance gene targets.  Please refer to the [facility] test directory  at [email or web address] for details.    Organism Identification: Blood Culture PCR    Method Type: PCR    RADIOLOGY:  < from: CT Abdomen and Pelvis No Cont (10.16.21 @ 01:04) >  IMPRESSION:  No evidence of obstructive uropathy. Thickening of the urinary bladder walls despite underdistention with infiltration of the perivesicular fat concerning for cystitis.    Trace complex fluid in the right paracolic gutter extending up to the right inguinal canal with infiltrative changes in the right inguinal region which may be secondary to recent procedure. Correlate with clinical history.    < end of copied text >

## 2024-08-08 ENCOUNTER — RESULT REVIEW (OUTPATIENT)
Age: 79
End: 2024-08-08

## 2024-08-14 ENCOUNTER — TRANSCRIPTION ENCOUNTER (OUTPATIENT)
Age: 79
End: 2024-08-14

## 2024-08-15 ENCOUNTER — TRANSCRIPTION ENCOUNTER (OUTPATIENT)
Age: 79
End: 2024-08-15

## 2025-01-10 ENCOUNTER — INPATIENT (INPATIENT)
Facility: HOSPITAL | Age: 80
LOS: 25 days | Discharge: NURSING, REHAB OR LONG-TERM CARE | End: 2025-02-05
Attending: STUDENT IN AN ORGANIZED HEALTH CARE EDUCATION/TRAINING PROGRAM | Admitting: STUDENT IN AN ORGANIZED HEALTH CARE EDUCATION/TRAINING PROGRAM
Payer: MEDICARE

## 2025-01-10 VITALS
HEART RATE: 71 BPM | HEIGHT: 70 IN | DIASTOLIC BLOOD PRESSURE: 59 MMHG | WEIGHT: 248.9 LBS | SYSTOLIC BLOOD PRESSURE: 109 MMHG | OXYGEN SATURATION: 100 % | RESPIRATION RATE: 20 BRPM

## 2025-01-10 DIAGNOSIS — E11.65 TYPE 2 DIABETES MELLITUS WITH HYPERGLYCEMIA: ICD-10-CM

## 2025-01-10 DIAGNOSIS — I25.10 ATHEROSCLEROTIC HEART DISEASE OF NATIVE CORONARY ARTERY WITHOUT ANGINA PECTORIS: ICD-10-CM

## 2025-01-10 DIAGNOSIS — I48.91 UNSPECIFIED ATRIAL FIBRILLATION: ICD-10-CM

## 2025-01-10 DIAGNOSIS — Z95.0 PRESENCE OF CARDIAC PACEMAKER: Chronic | ICD-10-CM

## 2025-01-10 DIAGNOSIS — E78.5 HYPERLIPIDEMIA, UNSPECIFIED: ICD-10-CM

## 2025-01-10 DIAGNOSIS — Z98.89 OTHER SPECIFIED POSTPROCEDURAL STATES: Chronic | ICD-10-CM

## 2025-01-10 DIAGNOSIS — I50.43 ACUTE ON CHRONIC COMBINED SYSTOLIC (CONGESTIVE) AND DIASTOLIC (CONGESTIVE) HEART FAILURE: ICD-10-CM

## 2025-01-10 DIAGNOSIS — K92.2 GASTROINTESTINAL HEMORRHAGE, UNSPECIFIED: ICD-10-CM

## 2025-01-10 DIAGNOSIS — D62 ACUTE POSTHEMORRHAGIC ANEMIA: ICD-10-CM

## 2025-01-10 DIAGNOSIS — Z95.810 PRESENCE OF AUTOMATIC (IMPLANTABLE) CARDIAC DEFIBRILLATOR: Chronic | ICD-10-CM

## 2025-01-10 DIAGNOSIS — N18.4 CHRONIC KIDNEY DISEASE, STAGE 4 (SEVERE): ICD-10-CM

## 2025-01-10 DIAGNOSIS — I10 ESSENTIAL (PRIMARY) HYPERTENSION: ICD-10-CM

## 2025-01-10 DIAGNOSIS — J44.9 CHRONIC OBSTRUCTIVE PULMONARY DISEASE, UNSPECIFIED: ICD-10-CM

## 2025-01-10 DIAGNOSIS — J96.11 CHRONIC RESPIRATORY FAILURE WITH HYPOXIA: ICD-10-CM

## 2025-01-10 DIAGNOSIS — Z95.1 PRESENCE OF AORTOCORONARY BYPASS GRAFT: Chronic | ICD-10-CM

## 2025-01-10 LAB
ALBUMIN SERPL ELPH-MCNC: 2.7 G/DL — LOW (ref 3.3–5)
ALP SERPL-CCNC: 224 U/L — HIGH (ref 40–120)
ALT FLD-CCNC: 20 U/L — SIGNIFICANT CHANGE UP (ref 12–78)
ANION GAP SERPL CALC-SCNC: 10 MMOL/L — SIGNIFICANT CHANGE UP (ref 5–17)
ANISOCYTOSIS BLD QL: SLIGHT — SIGNIFICANT CHANGE UP
AST SERPL-CCNC: 27 U/L — SIGNIFICANT CHANGE UP (ref 15–37)
BASE EXCESS BLDA CALC-SCNC: 10.8 MMOL/L — HIGH (ref -2–3)
BASOPHILS # BLD AUTO: 0.02 K/UL — SIGNIFICANT CHANGE UP (ref 0–0.2)
BASOPHILS NFR BLD AUTO: 0.2 % — SIGNIFICANT CHANGE UP (ref 0–2)
BILIRUB SERPL-MCNC: 0.9 MG/DL — SIGNIFICANT CHANGE UP (ref 0.2–1.2)
BLD GP AB SCN SERPL QL: SIGNIFICANT CHANGE UP
BLOOD GAS COMMENTS ARTERIAL: SIGNIFICANT CHANGE UP
BUN SERPL-MCNC: 61 MG/DL — HIGH (ref 7–23)
CALCIUM SERPL-MCNC: 8.9 MG/DL — SIGNIFICANT CHANGE UP (ref 8.5–10.1)
CHLORIDE SERPL-SCNC: 92 MMOL/L — LOW (ref 96–108)
CO2 BLDA-SCNC: 36 MMOL/L — HIGH (ref 19–24)
CO2 SERPL-SCNC: 30 MMOL/L — SIGNIFICANT CHANGE UP (ref 22–31)
CREAT SERPL-MCNC: 2.71 MG/DL — HIGH (ref 0.5–1.3)
DACRYOCYTES BLD QL SMEAR: SLIGHT — SIGNIFICANT CHANGE UP
EGFR: 23 ML/MIN/1.73M2 — LOW
EOSINOPHIL # BLD AUTO: 0.13 K/UL — SIGNIFICANT CHANGE UP (ref 0–0.5)
EOSINOPHIL NFR BLD AUTO: 1 % — SIGNIFICANT CHANGE UP (ref 0–6)
GAS PNL BLDA: SIGNIFICANT CHANGE UP
GLUCOSE BLDC GLUCOMTR-MCNC: 233 MG/DL — HIGH (ref 70–99)
GLUCOSE SERPL-MCNC: 256 MG/DL — HIGH (ref 70–99)
HCO3 BLDA-SCNC: 35 MMOL/L — HIGH (ref 21–28)
HCT VFR BLD CALC: 23.4 % — LOW (ref 39–50)
HGB BLD-MCNC: 6.5 G/DL — CRITICAL LOW (ref 13–17)
HOROWITZ INDEX BLDA+IHG-RTO: 100 — SIGNIFICANT CHANGE UP
HYPOCHROMIA BLD QL: SLIGHT — SIGNIFICANT CHANGE UP
IMM GRANULOCYTES NFR BLD AUTO: 0.5 % — SIGNIFICANT CHANGE UP (ref 0–0.9)
LYMPHOCYTES # BLD AUTO: 1.02 K/UL — SIGNIFICANT CHANGE UP (ref 1–3.3)
LYMPHOCYTES # BLD AUTO: 7.8 % — LOW (ref 13–44)
MAGNESIUM SERPL-MCNC: 2.8 MG/DL — HIGH (ref 1.6–2.6)
MANUAL SMEAR VERIFICATION: SIGNIFICANT CHANGE UP
MCHC RBC-ENTMCNC: 23.2 PG — LOW (ref 27–34)
MCHC RBC-ENTMCNC: 27.8 G/DL — LOW (ref 32–36)
MCV RBC AUTO: 83.6 FL — SIGNIFICANT CHANGE UP (ref 80–100)
MONOCYTES # BLD AUTO: 1.59 K/UL — HIGH (ref 0–0.9)
MONOCYTES NFR BLD AUTO: 12.1 % — SIGNIFICANT CHANGE UP (ref 2–14)
NEUTROPHILS # BLD AUTO: 10.34 K/UL — HIGH (ref 1.8–7.4)
NEUTROPHILS NFR BLD AUTO: 78.4 % — HIGH (ref 43–77)
NRBC # BLD: 1 /100 WBCS — HIGH (ref 0–0)
NRBC BLD-RTO: 1 /100 WBCS — HIGH (ref 0–0)
NT-PROBNP SERPL-SCNC: 4773 PG/ML — HIGH (ref 0–450)
OVALOCYTES BLD QL SMEAR: SLIGHT — SIGNIFICANT CHANGE UP
PCO2 BLDA: 44 MMHG — SIGNIFICANT CHANGE UP (ref 32–46)
PH BLDA: 7.51 — HIGH (ref 7.35–7.45)
PLAT MORPH BLD: NORMAL — SIGNIFICANT CHANGE UP
PLATELET # BLD AUTO: 181 K/UL — SIGNIFICANT CHANGE UP (ref 150–400)
PO2 BLDA: 415 MMHG — HIGH (ref 83–108)
POIKILOCYTOSIS BLD QL AUTO: SLIGHT — SIGNIFICANT CHANGE UP
POLYCHROMASIA BLD QL SMEAR: SLIGHT — SIGNIFICANT CHANGE UP
POTASSIUM SERPL-MCNC: 4.5 MMOL/L — SIGNIFICANT CHANGE UP (ref 3.5–5.3)
POTASSIUM SERPL-SCNC: 4.5 MMOL/L — SIGNIFICANT CHANGE UP (ref 3.5–5.3)
PROT SERPL-MCNC: 6.7 GM/DL — SIGNIFICANT CHANGE UP (ref 6–8.3)
RAPID RVP RESULT: SIGNIFICANT CHANGE UP
RBC # BLD: 2.8 M/UL — LOW (ref 4.2–5.8)
RBC # FLD: 18.9 % — HIGH (ref 10.3–14.5)
RBC BLD AUTO: ABNORMAL
SAO2 % BLDA: 99.2 % — HIGH (ref 94–98)
SARS-COV-2 RNA SPEC QL NAA+PROBE: SIGNIFICANT CHANGE UP
SODIUM SERPL-SCNC: 132 MMOL/L — LOW (ref 135–145)
TARGETS BLD QL SMEAR: SLIGHT — SIGNIFICANT CHANGE UP
WBC # BLD: 13.16 K/UL — HIGH (ref 3.8–10.5)
WBC # FLD AUTO: 13.16 K/UL — HIGH (ref 3.8–10.5)

## 2025-01-10 PROCEDURE — 99285 EMERGENCY DEPT VISIT HI MDM: CPT

## 2025-01-10 PROCEDURE — 93010 ELECTROCARDIOGRAM REPORT: CPT

## 2025-01-10 PROCEDURE — 99223 1ST HOSP IP/OBS HIGH 75: CPT

## 2025-01-10 PROCEDURE — 71045 X-RAY EXAM CHEST 1 VIEW: CPT | Mod: 26

## 2025-01-10 RX ORDER — DM/PSEUDOEPHED/ACETAMINOPHEN 10-30-250
12.5 CAPSULE ORAL ONCE
Refills: 0 | Status: DISCONTINUED | OUTPATIENT
Start: 2025-01-10 | End: 2025-01-28

## 2025-01-10 RX ORDER — SODIUM CHLORIDE 9 G/ML
1000 INJECTION, SOLUTION INTRAVENOUS
Refills: 0 | Status: DISCONTINUED | OUTPATIENT
Start: 2025-01-10 | End: 2025-01-30

## 2025-01-10 RX ORDER — INSULIN LISPRO 100/ML
VIAL (ML) SUBCUTANEOUS
Refills: 0 | Status: DISCONTINUED | OUTPATIENT
Start: 2025-01-10 | End: 2025-01-28

## 2025-01-10 RX ORDER — DM/PSEUDOEPHED/ACETAMINOPHEN 10-30-250
25 CAPSULE ORAL ONCE
Refills: 0 | Status: DISCONTINUED | OUTPATIENT
Start: 2025-01-10 | End: 2025-01-28

## 2025-01-10 RX ORDER — GLUCAGON 3 MG/1
1 POWDER NASAL ONCE
Refills: 0 | Status: DISCONTINUED | OUTPATIENT
Start: 2025-01-10 | End: 2025-02-05

## 2025-01-10 RX ORDER — METOPROLOL SUCCINATE 25 MG
50 TABLET, EXTENDED RELEASE 24 HR ORAL DAILY
Refills: 0 | Status: DISCONTINUED | OUTPATIENT
Start: 2025-01-10 | End: 2025-01-11

## 2025-01-10 RX ORDER — MAGNESIUM, ALUMINUM HYDROXIDE 200-225/5
30 SUSPENSION, ORAL (FINAL DOSE FORM) ORAL EVERY 4 HOURS
Refills: 0 | Status: DISCONTINUED | OUTPATIENT
Start: 2025-01-10 | End: 2025-01-27

## 2025-01-10 RX ORDER — INSULIN GLARGINE-YFGN 100 [IU]/ML
15 INJECTION, SOLUTION SUBCUTANEOUS AT BEDTIME
Refills: 0 | Status: DISCONTINUED | OUTPATIENT
Start: 2025-01-10 | End: 2025-01-15

## 2025-01-10 RX ORDER — ATORVASTATIN CALCIUM 80 MG/1
20 TABLET, FILM COATED ORAL AT BEDTIME
Refills: 0 | Status: DISCONTINUED | OUTPATIENT
Start: 2025-01-10 | End: 2025-02-05

## 2025-01-10 RX ORDER — ONDANSETRON 4 MG/1
4 TABLET, ORALLY DISINTEGRATING ORAL EVERY 8 HOURS
Refills: 0 | Status: DISCONTINUED | OUTPATIENT
Start: 2025-01-10 | End: 2025-01-27

## 2025-01-10 RX ORDER — PANTOPRAZOLE 20 MG/1
40 TABLET, DELAYED RELEASE ORAL
Refills: 0 | Status: DISCONTINUED | OUTPATIENT
Start: 2025-01-10 | End: 2025-01-22

## 2025-01-10 RX ORDER — TAMSULOSIN HYDROCHLORIDE 0.4 MG/1
0.4 CAPSULE ORAL AT BEDTIME
Refills: 0 | Status: DISCONTINUED | OUTPATIENT
Start: 2025-01-10 | End: 2025-02-05

## 2025-01-10 RX ORDER — PANTOPRAZOLE 20 MG/1
40 TABLET, DELAYED RELEASE ORAL ONCE
Refills: 0 | Status: COMPLETED | OUTPATIENT
Start: 2025-01-10 | End: 2025-01-10

## 2025-01-10 RX ORDER — IPRATROPIUM BROMIDE AND ALBUTEROL SULFATE .5; 2.5 MG/3ML; MG/3ML
3 SOLUTION RESPIRATORY (INHALATION) EVERY 6 HOURS
Refills: 0 | Status: DISCONTINUED | OUTPATIENT
Start: 2025-01-10 | End: 2025-02-05

## 2025-01-10 RX ORDER — ACETAMINOPHEN 160 MG/5ML
650 SUSPENSION ORAL EVERY 6 HOURS
Refills: 0 | Status: DISCONTINUED | OUTPATIENT
Start: 2025-01-10 | End: 2025-02-05

## 2025-01-10 RX ORDER — ACETAMINOPHEN, DIPHENHYDRAMINE HCL, PHENYLEPHRINE HCL 325; 25; 5 MG/1; MG/1; MG/1
3 TABLET ORAL AT BEDTIME
Refills: 0 | Status: DISCONTINUED | OUTPATIENT
Start: 2025-01-10 | End: 2025-01-20

## 2025-01-10 RX ORDER — TIOTROPIUM BROMIDE MONOHYDRATE 18 UG/1
2 CAPSULE ORAL; RESPIRATORY (INHALATION) DAILY
Refills: 0 | Status: DISCONTINUED | OUTPATIENT
Start: 2025-01-10 | End: 2025-02-05

## 2025-01-10 RX ORDER — DM/PSEUDOEPHED/ACETAMINOPHEN 10-30-250
15 CAPSULE ORAL ONCE
Refills: 0 | Status: DISCONTINUED | OUTPATIENT
Start: 2025-01-10 | End: 2025-01-28

## 2025-01-10 RX ORDER — FLUTICASONE PROPIONATE AND SALMETEROL 113; 14 UG/1; UG/1
1 POWDER, METERED RESPIRATORY (INHALATION)
Refills: 0 | Status: DISCONTINUED | OUTPATIENT
Start: 2025-01-10 | End: 2025-02-05

## 2025-01-10 RX ADMIN — Medication 40 MILLIGRAM(S): at 16:31

## 2025-01-10 RX ADMIN — PANTOPRAZOLE 40 MILLIGRAM(S): 20 TABLET, DELAYED RELEASE ORAL at 18:53

## 2025-01-10 RX ADMIN — PANTOPRAZOLE 40 MILLIGRAM(S): 20 TABLET, DELAYED RELEASE ORAL at 22:13

## 2025-01-10 RX ADMIN — INSULIN GLARGINE-YFGN 15 UNIT(S): 100 INJECTION, SOLUTION SUBCUTANEOUS at 23:12

## 2025-01-10 NOTE — H&P ADULT - ASSESSMENT
Arun Solares is an 79 year old male with PMHx of HTN, HLD, IDDM2, CAD (s/p CABG), pAF (on rivaroxaban), chronic combined HFpEF and HFrEF (LV EF 25-50% and grade III diastolic dysfunction on echo 8/9/24, s/p AICD placement), chronic hypoxic respiratory failure secondary to COPD (baseline on 3-4L NC at home), CKD stage IV, and BPH who presented to the ED on 1/10/25 for complaints of shortness of breath and admitted for acute on chronic combined HFpEF and HFrEF exacerbation.    Acute on chronic combined HFpEF and HFrEF exacerbation  Reports ***  Pro-BNP 4773 on admission, ABG 7.51 / 44 / 415 / 35 on BiPAP 15/6 on 100%  CXR personally reviewed; cardiomegaly, vascular congestion, blunting of L. costophrenic angle to suggest pleural effusion, appears unchanged from CXR 8/8/24  Prior TTE (on 8/8/24) with LV EF 25-30%, global LV hypokinesis, grade III diastolic dysfunction, moderate pHTN, mild [place], moderate TR   S/p furosemide 40 mg IV in the ED  Aggressive diuresis with furosemide 40 mg IV daily continued, strict Is and Os, daily weights, low salt diet, 2L fluid restriction  F/u echocardiogram  Monitor renal function and electrolytes while on IV diuresis  Telemetry    New onset anemia, unclear etiology  Hgb 6.5 on admission, baseline ~14? but most recent hgb 10.4 (on 11/25/24)  No signs of active bleeding  S/p type and screen in the ED  Two units pRBCs ordered by ER physician, monitor closely for signs of TACO  F/u anemia panel, FOBT, post-transfusion CBC, transfuse for hgb < 8      Chronic medical conditions:  HTN:  HLD:   IDDM2 with hyperglycemia: last known A1c 9.4 (on 11/25/24), blood glucose 256 on admission, PTA Lantus 15 U qhs, low dose SSI qac started, blood glucose goal < 180  CAD (s/p CABG): PTA ASA 81 mg held due to anemia  pAF (on rivaroxaban): PTA rivaroxaban ** held due to anemia  CKD stage IV: BUN 61 and Cr 2.71 on admission, baseline Cr appears to fluctuate but Cr 2.92 (on 11/25/24), avoid nephrotoxins, renally dose meds, encourage PO hydration  BPH: PTA tamsulosin 0.4 mg    Medication reconciliation completed using med list ***   Arun Solares is an 79 year old male with PMHx of HTN, HLD, IDDM2, CAD (s/p CABG), pAF (on rivaroxaban), chronic combined HFpEF and HFrEF (LV EF 25-50% and grade III diastolic dysfunction on echo 8/9/24, s/p AICD placement), chronic hypoxic respiratory failure secondary to COPD (baseline on 3-4L NC at home), CKD stage IV, and BPH who presented to the ED on 1/10/25 for complaints of shortness of breath and admitted for acute on chronic combined HFpEF and HFrEF exacerbation.    Acute on chronic combined HFpEF and HFrEF exacerbation  Reports ***  Pro-BNP 4773 on admission, ABG 7.51 / 44 / 415 / 35 on BiPAP 15/6 on 100%  CXR personally reviewed; cardiomegaly, vascular congestion, blunting of L. costophrenic angle to suggest pleural effusion, appears unchanged from CXR 8/8/24  Prior TTE (on 8/8/24) with LV EF 25-30%, global LV hypokinesis, grade III diastolic dysfunction, moderate pHTN, mild [place], moderate TR   S/p furosemide 40 mg IV in the ED  Aggressive diuresis with furosemide 40 mg IV daily continued, strict Is and Os, daily weights, low salt diet, 2L fluid restriction  Continue GDMT with ACEi and beta-blockade, PTA spironolactone held for now  F/u echocardiogram  Monitor renal function and electrolytes while on IV diuresis  Telemetry    New onset anemia, unclear etiology  Hgb 6.5 on admission, baseline ~14? but most recent hgb 10.4 (on 11/25/24)  No signs of active bleeding  S/p type and screen in the ED  Two units pRBCs ordered by ER physician, monitor closely for signs of TACO  F/u anemia panel, FOBT, post-transfusion CBC, transfuse for hgb < 8      Chronic medical conditions:  HTN: PTA metoprolol succinate 50 mg, ramipril 2.5 mg reordered as lisinopril 10 mg given ramipril not on formulary  HLD: PTA simvastatin 40 mg reordered as atorvastatin 20 mg given simvastatin not on formulary  IDDM2 with hyperglycemia: last known A1c 9.4 (on 11/25/24), blood glucose 256 on admission, PTA Lantus 15 U qhs, low dose SSI qac started, blood glucose goal < 180  CAD (s/p CABG): PTA ASA 81 mg held due to anemia  pAF (on rivaroxaban): PTA rivaroxaban 15 mg held due to anemia  Chronic hypoxic respiratory failure secondary to COPD (baseline on 3-4L NC at home): PTA tiotropium, symbicort reordered as advair, duonebs PRN  CKD stage IV: BUN 61 and Cr 2.71 on admission, baseline Cr appears to fluctuate but Cr 2.92 (on 11/25/24), avoid nephrotoxins, renally dose meds, encourage PO hydration, pending official nephrology recs - please f/u in AM  BPH: PTA tamsulosin 0.4 mg    Medication reconciliation completed using discharge med list on 8/15/24. Arun Solares is an 79 year old male with PMHx of HTN, HLD, IDDM2, CAD (s/p CABG), pAF (on rivaroxaban), chronic combined HFpEF and HFrEF (LV EF 25-50% and grade III diastolic dysfunction on echo 8/9/24, s/p AICD placement), hx of severe aortic stenosis (s/p TAVR), chronic hypoxic respiratory failure secondary to COPD (baseline on 3-4L NC at home), CKD stage IV, and BPH who presented to the ED on 1/10/25 for complaints of shortness of breath and admitted for acute on chronic combined HFpEF and HFrEF exacerbation and suspected upper GI bleed.    Acute on chronic combined HFpEF and HFrEF exacerbation  Reports increased shortness of breath and had increased fluid retention x 2 weeks  States his left hand, abdomen, and leg have been very swollen  Sleeps on recliner due to inability to lay flat, denies paroxysmal nocturnal dyspnea  Pro-BNP 4773 on admission, ABG 7.51 / 44 / 415 / 35 on BiPAP 15/6 on 100%  CXR personally reviewed; cardiomegaly, vascular congestion, blunting of L. costophrenic angle to suggest pleural effusion, appears unchanged from CXR 8/8/24  Prior TTE (on 8/8/24) with LV EF 25-30%, global LV hypokinesis, grade III diastolic dysfunction, moderate pHTN, mild [place], moderate TR   S/p furosemide 40 mg IV in the ED  Aggressive diuresis with furosemide 40 mg IV daily continued, strict Is and Os, daily weights, low salt diet, 2L fluid restriction  Continue GDMT with ACEi and beta-blockade  F/u echocardiogram, LUE venous doppler to r/o DVT  Monitor renal function and electrolytes while on IV diuresis  Telemetry    Symptomatic anemia secondary to suspected upper GI bleed  Differential includes PUD (given recent NSAID use)  Reports increased shortness of breath x 2 months, likely component of SOB due to severity of anemia  Admits to two black bowel movements, most recent colonoscopy > 10 years ago and had polyps removed  Hgb 6.5 on admission, baseline ~14? but most recent hgb 10.4 (on 11/25/24)  S/p type and screen in the ED  Two units pRBCs ordered by ER physician, monitor closely for signs of TACO given CHF  NPO at midnight for GI eval in AM, pantoprazole 40 mg IV BID started, no IVF given hypervolemia  F/u anemia panel, FOBT, post-transfusion CBC, transfuse for hgb < 8  Consult GI for consideration of EGD - please call in AM      Chronic medical conditions:  HTN: PTA metoprolol succinate 50 mg, ramipril 2.5 mg reordered as lisinopril 10 mg given ramipril not on formulary  HLD: PTA simvastatin 40 mg reordered as atorvastatin 20 mg given simvastatin not on formulary  IDDM2 with hyperglycemia: last known A1c 9.4 (on 11/25/24), blood glucose 256 on admission, PTA Lantus 15 U qhs, low dose SSI qac started, blood glucose goal < 180  CAD (s/p CABG): PTA ASA 81 mg held due to anemia  pAF (on rivaroxaban): PTA rivaroxaban 15 mg held due to anemia  Chronic hypoxic respiratory failure secondary to COPD (baseline on 3-4L NC at home): PTA tiotropium, symbicort reordered as advair, duonebs PRN  CKD stage IV: BUN 61 and Cr 2.71 on admission, baseline Cr appears to fluctuate but Cr 2.92 (on 11/25/24), avoid nephrotoxins, renally dose meds, encourage PO hydration, pending official nephrology recs - please f/u in AM  BPH: PTA tamsulosin 0.4 mg    Medication reconciliation completed using discharge med list on 8/15/24. Patient reports he has not had any medication changes since then except he has not been taking spironolactone as no one refilled script for him.    Plan of care discussed with wife at bedside.  Arun Solares is an 79 year old male with PMHx of HTN, HLD, IDDM2, CAD (s/p CABG), pAF (on rivaroxaban), chronic combined HFpEF and HFrEF (LV EF 25-50% and grade III diastolic dysfunction on echo 8/9/24, s/p AICD placement), hx of severe aortic stenosis (s/p TAVR), chronic hypoxic respiratory failure secondary to COPD (baseline on 3-4L NC at home), CKD stage IV, and BPH who presented to the ED on 1/10/25 for complaints of shortness of breath and admitted for acute on chronic combined HFpEF and HFrEF exacerbation and acute blood loss anemia secondary to suspected upper GI bleed.    Acute on chronic combined HFpEF and HFrEF exacerbation  Reports increased shortness of breath and had increased fluid retention x 2 weeks  States his left hand, abdomen, and leg have been very swollen  Sleeps on recliner due to inability to lay flat, denies paroxysmal nocturnal dyspnea  Pro-BNP 4773 on admission, ABG 7.51 / 44 / 415 / 35 on BiPAP 15/6 on 100%  CXR personally reviewed; cardiomegaly, vascular congestion, blunting of L. costophrenic angle to suggest pleural effusion, appears unchanged from CXR 8/8/24  Prior TTE (on 8/8/24) with LV EF 25-30%, global LV hypokinesis, grade III diastolic dysfunction, moderate pHTN, mild [place], moderate TR   S/p furosemide 40 mg IV in the ED  Aggressive diuresis with furosemide 40 mg IV daily continued, strict Is and Os, daily weights, low salt diet, 2L fluid restriction  Continue GDMT with ACEi and beta-blockade  F/u echocardiogram, LUE venous doppler to r/o DVT  Monitor renal function and electrolytes while on IV diuresis  Telemetry    Acute blood loss anemia secondary to suspected upper GI bleed  Differential includes PUD (given recent NSAID use)  Reports increased shortness of breath x 2 months, likely component of SOB due to anemia given severity  Admits to two black bowel movements, most recent colonoscopy > 10 years ago and had polyps removed at that time  Hgb 6.5 on admission, baseline ~14? but most recent hgb 10.4 (on 11/25/24)  S/p type and screen in the ED  Two units pRBCs ordered by ER physician, monitor closely for signs of TACO given CHF  NPO at midnight for GI eval in AM, pantoprazole 40 mg IV BID started, no IVF given hypervolemia  F/u anemia panel, FOBT, post-transfusion CBC, transfuse for hgb < 8  Consult GI for consideration of EGD - please call in AM      Chronic medical conditions:  HTN: PTA metoprolol succinate 50 mg, ramipril 2.5 mg reordered as lisinopril 10 mg given ramipril not on formulary  HLD: PTA simvastatin 40 mg reordered as atorvastatin 20 mg given simvastatin not on formulary  IDDM2 with hyperglycemia: last known A1c 9.4 (on 11/25/24), blood glucose 256 on admission, PTA Lantus 15 U qhs, low dose SSI qac started, blood glucose goal < 180  CAD (s/p CABG): PTA ASA 81 mg held due to anemia  pAF (on rivaroxaban): PTA rivaroxaban 15 mg held due to anemia  Chronic hypoxic respiratory failure secondary to COPD (baseline on 3-4L NC at home): PTA tiotropium, symbicort reordered as advair, duonebs PRN  CKD stage IV: BUN 61 and Cr 2.71 on admission, baseline Cr appears to fluctuate but Cr 2.92 (on 11/25/24), avoid nephrotoxins, renally dose meds, encourage PO hydration, pending official nephrology recs - please f/u in AM  BPH: PTA tamsulosin 0.4 mg    Medication reconciliation completed using discharge med list on 8/15/24. Patient reports he has not had any medication changes since then except he has not been taking spironolactone as no one refilled script for him.    Plan of care discussed with wife at bedside.  Arun Solares is an 79 year old male with PMHx of HTN, HLD, IDDM2, CAD (s/p CABG), pAF (on rivaroxaban), chronic combined HFpEF and HFrEF (LV EF 25-50% and grade III diastolic dysfunction on echo 8/9/24, s/p AICD placement), hx of severe aortic stenosis (s/p TAVR), chronic hypoxic respiratory failure secondary to COPD (baseline on 3-4L NC at home), CKD stage IV, and BPH who presented to the ED on 1/10/25 for complaints of shortness of breath and admitted for acute on chronic combined HFpEF and HFrEF exacerbation and acute blood loss anemia secondary to suspected upper GI bleed.    Acute on chronic combined HFpEF and HFrEF exacerbation  Reports increased shortness of breath and had increased fluid retention x 2 weeks  States his left hand, abdomen, and leg have been very swollen  Sleeps on recliner due to inability to lay flat, denies paroxysmal nocturnal dyspnea  Pro-BNP 4773 on admission, ABG 7.51 / 44 / 415 / 35 on BiPAP 15/6 on 100%  CXR personally reviewed; cardiomegaly, vascular congestion, blunting of L. costophrenic angle to suggest pleural effusion, appears unchanged from CXR 8/8/24  Prior TTE (on 8/8/24) with LV EF 25-30%, global LV hypokinesis, grade III diastolic dysfunction, moderate pHTN, mild [place], moderate TR   S/p furosemide 40 mg IV in the ED  Aggressive diuresis with furosemide 40 mg IV daily continued, strict Is and Os, daily weights, low salt diet, 2L fluid restriction  Continue GDMT with ACEi and beta-blockade  F/u echocardiogram, LUE venous doppler to r/o DVT  Monitor renal function and electrolytes while on IV diuresis  Telemetry    Acute blood loss anemia secondary to suspected upper GI bleed  Differential includes PUD (given recent NSAID use)  Reports increased shortness of breath x 2 months, likely component of SOB due to anemia given severity  Admits to two black bowel movements, most recent colonoscopy > 10 years ago and had polyps removed at that time  Hgb 6.5 on admission, baseline ~14? but most recent hgb 10.4 (on 11/25/24)  S/p type and screen in the ED  Two units pRBCs ordered by ER physician, monitor closely for signs of TACO given CHF  NPO at midnight for GI eval in AM, pantoprazole 40 mg IV BID started, no IVF given hypervolemia  F/u anemia panel, FOBT, post-transfusion CBC, transfuse for hgb < 8  Consult GI for consideration of EGD - please call in AM      Chronic medical conditions:  HTN: PTA metoprolol succinate 50 mg, ramipril 2.5 mg reordered as lisinopril 10 mg given ramipril not on formulary  HLD: PTA simvastatin 40 mg reordered as atorvastatin 20 mg given simvastatin not on formulary  IDDM2 with hyperglycemia: last known A1c 9.4 (on 11/25/24), blood glucose 256 on admission, PTA Lantus 15 U qhs, low dose SSI qac started, blood glucose goal < 180  CAD (s/p CABG): PTA ASA 81 mg held due to suspected GI bleed  pAF (on rivaroxaban): PTA rivaroxaban 15 mg held due to suspected GI bleed  Chronic hypoxic respiratory failure secondary to COPD (baseline on 3-4L NC at home): PTA tiotropium, symbicort reordered as advair, duonebs PRN  CKD stage IV: BUN 61 and Cr 2.71 on admission, baseline Cr appears to fluctuate but Cr 2.92 (on 11/25/24), avoid nephrotoxins, renally dose meds, encourage PO hydration, pending official nephrology recs - please f/u in AM  BPH: PTA tamsulosin 0.4 mg    Medication reconciliation completed using discharge med list on 8/15/24. Patient reports he has not had any medication changes since then except he has not been taking spironolactone as no one refilled script for him.    Plan of care discussed with wife at bedside.  Arun Solares is an 79 year old male with PMHx of HTN, HLD, IDDM2, CAD (s/p CABG), pAF (on rivaroxaban), chronic combined HFpEF and HFrEF (LV EF 25-50% and grade III diastolic dysfunction on echo 8/9/24, s/p AICD placement), hx of severe aortic stenosis (s/p TAVR), chronic hypoxic respiratory failure secondary to COPD (baseline on 3-4L NC at home), CKD stage IV, and BPH who presented to the ED on 1/10/25 for complaints of shortness of breath and admitted for acute on chronic combined HFpEF and HFrEF exacerbation and acute blood loss anemia secondary to suspected upper GI bleed.    Acute on chronic combined HFpEF and HFrEF exacerbation  Reports increased shortness of breath and had increased fluid retention x 2 weeks  States his left hand, abdomen, and leg have been very swollen  Sleeps on recliner due to inability to lay flat, denies paroxysmal nocturnal dyspnea  Pro-BNP 4773 on admission, ABG 7.51 / 44 / 415 / 35 on BiPAP 15/6 on 100%  CXR personally reviewed; cardiomegaly, vascular congestion, blunting of L. costophrenic angle to suggest pleural effusion, appears unchanged from CXR 8/8/24  Prior TTE (on 8/8/24) with LV EF 25-30%, global LV hypokinesis, grade III diastolic dysfunction, moderate pHTN, mild [place], moderate TR   S/p furosemide 40 mg IV in the ED  Aggressive diuresis with furosemide 40 mg IV daily continued, strict Is and Os, daily weights, low salt diet, 2L fluid restriction  Continue GDMT with ACEi and beta-blockade  F/u echocardiogram, LUE venous doppler to r/o DVT  Monitor renal function and electrolytes while on IV diuresis  Telemetry    Acute blood loss anemia secondary to suspected upper GI bleed  Differential includes PUD (given recent NSAID use)  Reports increased shortness of breath x 2 months, likely component of SOB due to anemia given severity  Admits to two black bowel movements, most recent colonoscopy > 10 years ago and had polyps removed at that time  Hgb 6.5 on admission, baseline ~14? but most recent hgb 10.4 (on 11/25/24)  S/p type and screen in the ED  Two units pRBCs ordered by ER physician, monitor closely for signs of TACO given CHF  NPO at midnight for GI eval in AM, pantoprazole 40 mg IV BID started, no IVF given hypervolemia  F/u anemia panel, FOBT, post-transfusion CBC, transfuse for hgb < 8  Consult GI for consideration of EGD - please call in AM      Chronic medical conditions:  HTN: PTA metoprolol succinate 50 mg, ramipril 2.5 mg reordered as lisinopril 10 mg given ramipril not on formulary  HLD: PTA simvastatin 40 mg reordered as atorvastatin 20 mg given simvastatin not on formulary  IDDM2 with hyperglycemia: last known A1c 9.4 (on 11/25/24), blood glucose 256 on admission, POC qac and qhs, PTA Lantus 15 U qhs, low dose SSI qac started, blood glucose goal < 180  CAD (s/p CABG): PTA ASA 81 mg held due to suspected GI bleed  pAF (on rivaroxaban): PTA rivaroxaban 15 mg held due to suspected GI bleed  Chronic hypoxic respiratory failure secondary to COPD (baseline on 3-4L NC at home): PTA tiotropium, symbicort reordered as advair, duonebs PRN  CKD stage IV: BUN 61 and Cr 2.71 on admission, baseline Cr appears to fluctuate but Cr 2.92 (on 11/25/24), avoid nephrotoxins, renally dose meds, encourage PO hydration, pending official nephrology recs - please f/u in AM  BPH: PTA tamsulosin 0.4 mg    Medication reconciliation completed using discharge med list on 8/15/24. Patient reports he has not had any medication changes since then except he has not been taking spironolactone as no one refilled script for him.    Plan of care discussed with wife at bedside.  Arun Solares is an 79 year old male with PMHx of HTN, HLD, IDDM2, CAD (s/p CABG), pAF (on rivaroxaban), chronic combined HFpEF and HFrEF (LV EF 25-30% and grade III diastolic dysfunction on echo 8/9/24, s/p AICD placement), hx of severe aortic stenosis (s/p TAVR), chronic hypoxic respiratory failure secondary to COPD (baseline on 3-4L NC at home), CKD stage IV, and BPH who presented to the ED on 1/10/25 for complaints of shortness of breath and admitted for acute on chronic combined HFpEF and HFrEF exacerbation and acute blood loss anemia secondary to suspected upper GI bleed.    Acute on chronic combined HFpEF and HFrEF exacerbation  Reports increased shortness of breath and had increased fluid retention x 2 weeks  States his left hand, abdomen, and leg have been very swollen  Sleeps on recliner due to inability to lay flat, denies paroxysmal nocturnal dyspnea  Pro-BNP 4773 on admission, ABG 7.51 / 44 / 415 / 35 on BiPAP 15/6 on 100%  CXR personally reviewed; cardiomegaly, vascular congestion, blunting of L. costophrenic angle to suggest pleural effusion, appears unchanged from CXR 8/8/24  Prior TTE (on 8/8/24) with LV EF 25-30%, global LV hypokinesis, grade III diastolic dysfunction, moderate pHTN, mild [place], moderate TR   S/p furosemide 40 mg IV in the ED  Aggressive diuresis with furosemide 40 mg IV daily continued, strict Is and Os, daily weights, low salt diet, 2L fluid restriction  Continue GDMT with ACEi and beta-blockade  F/u echocardiogram, LUE venous doppler to r/o DVT  Monitor renal function and electrolytes while on IV diuresis  Telemetry    Acute blood loss anemia secondary to suspected upper GI bleed  Differential includes PUD (given recent NSAID use)  Reports increased shortness of breath x 2 months, likely component of SOB due to anemia given severity  Admits to two black bowel movements, most recent colonoscopy > 10 years ago and had polyps removed at that time  Hgb 6.5 on admission, baseline ~14? but most recent hgb 10.4 (on 11/25/24)  S/p type and screen in the ED  Two units pRBCs ordered by ER physician, monitor closely for signs of TACO given CHF  NPO at midnight for GI eval in AM, pantoprazole 40 mg IV BID started, no IVF given hypervolemia  F/u anemia panel, FOBT, post-transfusion CBC, transfuse for hgb < 8  Consult GI for consideration of EGD - please call in AM      Chronic medical conditions:  HTN: PTA metoprolol succinate 50 mg, ramipril 2.5 mg reordered as lisinopril 10 mg given ramipril not on formulary  HLD: PTA simvastatin 40 mg reordered as atorvastatin 20 mg given simvastatin not on formulary  IDDM2 with hyperglycemia: last known A1c 9.4 (on 11/25/24), blood glucose 256 on admission, POC qac and qhs, PTA Lantus 15 U qhs, low dose SSI qac started, blood glucose goal < 180  CAD (s/p CABG): PTA ASA 81 mg held due to suspected GI bleed  pAF (on rivaroxaban): PTA rivaroxaban 15 mg held due to suspected GI bleed  Chronic hypoxic respiratory failure secondary to COPD (baseline on 3-4L NC at home): PTA tiotropium, symbicort reordered as advair, duonebs PRN  CKD stage IV: BUN 61 and Cr 2.71 on admission, baseline Cr appears to fluctuate but Cr 2.92 (on 11/25/24), avoid nephrotoxins, renally dose meds, encourage PO hydration, pending official nephrology recs - please f/u in AM  BPH: PTA tamsulosin 0.4 mg    Medication reconciliation completed using discharge med list on 8/15/24. Patient reports he has not had any medication changes since then except he has not been taking spironolactone as no one refilled script for him.    Plan of care discussed with wife at bedside.

## 2025-01-10 NOTE — H&P ADULT - NSICDXPASTMEDICALHX_GEN_ALL_CORE_FT
PAST MEDICAL HISTORY:  Chronic combined systolic and diastolic heart failure     Chronic hypoxic respiratory failure, on home oxygen therapy     COPD (chronic obstructive pulmonary disease)     HLD (hyperlipidemia)     HTN (hypertension)     Insulin dependent type 2 diabetes mellitus     Stage 4 chronic kidney disease     Unspecified atrial fibrillation

## 2025-01-10 NOTE — H&P ADULT - HISTORY OF PRESENT ILLNESS
Arun Solares is an 79 year old male with PMHx of HTN, HLD, IDDM2, CAD (s/p CABG), pAF (on rivaroxaban), chronic combined HFpEF and HFrEF (LV EF 25-50% and grade III diastolic dysfunction on echo 8/9/24, s/p AICD placement), chronic hypoxic respiratory failure secondary to COPD (baseline on 3-4L NC at home), CKD stage IV, and BPH who presented to the ED on 1/10/25 for complaints of shortness of breath.      In the ED, VSS. No documented hypoxia but placed on BiPAP 15/6 on 100%. WBC 13.16K, hgb 6.5, sodium 132, BUN 61, Cr 2.71, blood glucose 256. Troponin WNL. Pro-BNP 4773. ABG 7.51 / 44 / 415 / 35 on BiPAP 15/6 on 100%. CXR personally reviewed; cardiomegaly, vascular congestion, blunting of L. costophrenic angle to suggest pleural effusion, appears unchanged from CXR 8/8/24. Received furosemide 40 mg IV. Requested ER physician to decrease FiO2 given O2 on ABG > 400. Arun Solares is an 79 year old male with PMHx of HTN, HLD, IDDM2, CAD (s/p CABG), pAF (on rivaroxaban), chronic combined HFpEF and HFrEF (LV EF 25-50% and grade III diastolic dysfunction on echo 8/9/24, s/p AICD placement), chronic hypoxic respiratory failure secondary to COPD (baseline on 3-4L NC at home), CKD stage IV, and BPH who presented to the ED on 1/10/25 for complaints of shortness of breath.      In the ED, VSS. No documented hypoxia but placed on BiPAP 15/6 on 100%. WBC 13.16K, hgb 6.5, sodium 132, BUN 61, Cr 2.71, blood glucose 256. Troponin WNL. Pro-BNP 4773. ABG 7.51 / 44 / 415 / 35 on BiPAP 15/6 on 100%. CXR personally reviewed; cardiomegaly, vascular congestion, blunting of L. costophrenic angle to suggest pleural effusion, appears unchanged from CXR 8/8/24. Received furosemide 40 mg IV. Evaluated by nephrology who recommends furosemide 40 mg IV daily given takes 40 mg PO BID at home. Requested ER physician to decrease FiO2 given O2 on ABG > 400.  Arun Solares is an 79 year old male with PMHx of HTN, HLD, IDDM2, CAD (s/p CABG), pAF (on rivaroxaban), chronic combined HFpEF and HFrEF (LV EF 25-50% and grade III diastolic dysfunction on echo 8/9/24, s/p AICD placement), hx of severe aortic stenosis (s/p TAVR), chronic hypoxic respiratory failure secondary to COPD (baseline on 3-4L NC at home), CKD stage IV, and BPH who presented to the ED on 1/10/25 for complaints of shortness of breath.    History primarily obtained form wife at bedside. As per wife at bedside, patient is chronically short of breath and uses up to 4L NC continuously. However, for the past two weeks, he has been more short of breath and had increased fluid retention. States his left hand, abdomen, and leg have been very swollen. Sleeps on recliner as he is unable to lay flat. No paroxysmal nocturnal dyspnea. Reports intermittent productive cough with white/grey phlegm as well. Denies taking OTC antitussives. No recent travel or known sick contacts. Has been compliant with supplemental oxygen and diuretics. However, states he was discharged from [facility] in August 2025 with prescription of spironolactone 25 mg. Took 1-month supply and then stopped taking it as no other physician gave him another prescription for it. In addition, patient states he has had two episodes of black stools. Took ibuprofen a few days ago for hand spasms. Last took aspirin and xarelto today. States last colonoscopy was > 10 years ago and he had a few polyps removed at that time. Baseline functional status is ambulates unassisted and sometimes uses a walker. Independent with all ADLs. Lives at home with wife.     In the ED, VSS. No documented hypoxia but placed on BiPAP 15/6 on 100%. WBC 13.16K, hgb 6.5, sodium 132, BUN 61, Cr 2.71, blood glucose 256. Troponin WNL. Pro-BNP 4773. ABG 7.51 / 44 / 415 / 35 on BiPAP 15/6 on 100%. CXR personally reviewed; cardiomegaly, vascular congestion, blunting of L. costophrenic angle to suggest pleural effusion, appears unchanged from CXR 8/8/24. Received furosemide 40 mg IV. Evaluated by nephrology who recommends furosemide 40 mg IV daily given takes 40 mg PO BID at home. Requested ER physician to decrease FiO2 given O2 on ABG > 400.  Arun Solares is an 79 year old male with PMHx of HTN, HLD, IDDM2, CAD (s/p CABG), pAF (on rivaroxaban), chronic combined HFpEF and HFrEF (LV EF 25-30% and grade III diastolic dysfunction on echo 8/9/24, s/p AICD placement), hx of severe aortic stenosis (s/p TAVR), chronic hypoxic respiratory failure secondary to COPD (baseline on 3-4L NC at home), CKD stage IV, and BPH who presented to the ED on 1/10/25 for complaints of shortness of breath.    History primarily obtained form wife at bedside. As per wife at bedside, patient is chronically short of breath and uses up to 4L NC continuously. However, for the past two weeks, he has been more short of breath and had increased fluid retention. States his left hand, abdomen, and leg have been very swollen. Sleeps on recliner as he is unable to lay flat. No paroxysmal nocturnal dyspnea. Reports intermittent productive cough with white/grey phlegm as well. Denies taking OTC antitussives. No recent travel or known sick contacts. Has been compliant with supplemental oxygen and diuretics. However, states he was discharged from [facility] in August 2025 with prescription of spironolactone 25 mg. Took 1-month supply and then stopped taking it as no other physician gave him another prescription for it. In addition, patient states he has had two episodes of black stools. Took ibuprofen a few days ago for hand spasms. Last took aspirin and xarelto today. States last colonoscopy was > 10 years ago and he had a few polyps removed at that time. Baseline functional status is ambulates unassisted and sometimes uses a walker. Independent with all ADLs. Lives at home with wife.     In the ED, VSS. No documented hypoxia but placed on BiPAP 15/6 on 100%. WBC 13.16K, hgb 6.5, sodium 132, BUN 61, Cr 2.71, blood glucose 256. Troponin WNL. Pro-BNP 4773. ABG 7.51 / 44 / 415 / 35 on BiPAP 15/6 on 100%. CXR personally reviewed; cardiomegaly, vascular congestion, blunting of L. costophrenic angle to suggest pleural effusion, appears unchanged from CXR 8/8/24. Received furosemide 40 mg IV. Evaluated by nephrology who recommends furosemide 40 mg IV daily given takes 40 mg PO BID at home. Requested ER physician to decrease FiO2 given O2 on ABG > 400.

## 2025-01-10 NOTE — ED PROVIDER NOTE - CLINICAL SUMMARY MEDICAL DECISION MAKING FREE TEXT BOX
79 years old male here by ems with wife [phone] from home with cpap c/o sob increases to laid down and exertion for 4 to 5 days. Pt also c/o his arms and legs are swelling. Pt sts he has a hx of renal problem and currently under care by Dr. Rowe nephrologist. Pt takes lasix 80 mg bid last dose was this morning. Pt has a hx of chf EF 25 to 30 % Pt is alert and oriented x 3 speaking in clear full sentences no respiratory distress on cpap. + pitting edema upper and lower extremities bilaterally + jvd rales thru out the lungs.   consulted Dr. Rowe pt's nephrologist. 79 years old male here by ems with wife [phone] from home with cpap c/o sob increases to laid down and exertion for 4 to 5 days. Pt also c/o his arms and legs are swelling. Pt sts he has a hx of renal problem and currently under care by Dr. Rowe nephrologist. Pt takes lasix 80 mg bid last dose was this morning. Pt has a hx of chf EF 25 to 30 % Pt is alert and oriented x 3 speaking in clear full sentences no respiratory distress on cpap. + pitting edema upper and lower extremities bilaterally + jvd rales thru out the lungs.   consulted Dr. Rowe pt's nephrologist.  CXR + left lower lobe consolidation PPM with clipps 79 years old male here by ems with wife [phone] from home with cpap c/o sob increases to laid down and exertion for 4 to 5 days. Pt also c/o his arms and legs are swelling. Pt sts he has a hx of renal problem and currently under care by Dr. Rowe nephrologist. Pt takes lasix 80 mg bid last dose was this morning. Pt has a hx of chf EF 25 to 30 % Pt is alert and oriented x 3 speaking in clear full sentences no respiratory distress on cpap. + pitting edema upper and lower extremities bilaterally + jvd rales thru out the lungs.   consulted Dr. Rowe pt's nephrologist.  CXR + left lower lobe consolidation PPM with clipps  Pt sts he as brown stool. no bleeding

## 2025-01-10 NOTE — ED PROVIDER NOTE - CARE PLAN
1 Principal Discharge DX:	Symptomatic anemia  Secondary Diagnosis:	CHF exacerbation  Secondary Diagnosis:	Acute-on-chronic renal failure

## 2025-01-10 NOTE — ED PROVIDER NOTE - NSICDXPASTSURGICALHX_GEN_ALL_CORE_FT
PAST SURGICAL HISTORY:  S/P CABG x 3 cabg3  in 2003    S/P cardiac cath cardiac stentin 2011    S/P cardiac pacemaker procedure defibrilator 2012    S/P hernia repair hernia [id number]

## 2025-01-10 NOTE — ED ADULT NURSE NOTE - OBJECTIVE STATEMENT
Patient BIBA for worsening SOB. Patient accompanied by wife states he has been having SOB for the past few weeks, worsening over the past few days, on lasix 40mg BID. Patient on CPAP by EMS, accessory muscle use noted, as per EMS, co2 level was 12. Patient has extensive +4 edema above knee, abdomen distended, and arms. Patient denies any chest pain, dizziness, placed on bipap by respiratory

## 2025-01-10 NOTE — H&P ADULT - NSICDXPASTSURGICALHX_GEN_ALL_CORE_FT
PAST SURGICAL HISTORY:  S/P CABG x 3 cabg3  in 2003    S/P hernia repair hernia [id number]    S/P implantation of automatic cardioverter/defibrillator (AICD)

## 2025-01-10 NOTE — ED ADULT NURSE NOTE - NSFALLHARMRISKINTERV_ED_ALL_ED

## 2025-01-10 NOTE — ED ADULT TRIAGE NOTE - AVIAN FLU SYMPTOMS
No Quality 130: Documentation Of Current Medications In The Medical Record: Current Medications Documented Quality 111:Pneumonia Vaccination Status For Older Adults: Pneumococcal Vaccination Previously Received Quality 110: Preventive Care And Screening: Influenza Immunization: Influenza Immunization previously received during influenza season Detail Level: Detailed

## 2025-01-10 NOTE — H&P ADULT - NSHPPHYSICALEXAM_GEN_ALL_CORE
T(C): 36.7 (01-10-25 @ 18:49), Max: 36.7 (01-10-25 @ 18:49)  HR: 70 (01-10-25 @ 18:49) (70 - 77)  BP: 119/79 (01-10-25 @ 18:49) (109/59 - 121/73)  RR: 18 (01-10-25 @ 18:49) (18 - 20)  SpO2: 100% (01-10-25 @ 16:22) (100% - 100%)    CONSTITUTIONAL: Well groomed  EYES: PERRLA and symmetric, EOMI  RESP: diminished breath sounds b/l more so on L. when compared to R., on BiPAP  CV: RRR  GI: distended, tense to palpation  SKIN: L. hand swollen, LUE 2+ pitting edema, b/l LE 3+ pitting edema

## 2025-01-10 NOTE — H&P ADULT - NSHPLABSRESULTS_GEN_ALL_CORE
6.5    13.16 )-----------( 181      ( 10 Tay 2025 16:20 )             23.4     132[L]  |  92[L]  |  61[H]  ----------------------------<  256[H]     01-10  4.5   |  30  |  2.71[H]    Ca    8.9      10 Tay 2025 16:20  Mg     2.8     01-10    TPro  6.7  /  Alb  2.7[L]  /  TBili  0.9  /  DBili  x   /  AST  27  /  ALT  20  /  AlkPhos  224[H]  01-10    Pro-BNP: 4773 (01-10-25 @ 16:20)    ABG - ( 10 Tay 2025 17:04 )  pH, Arterial: 7.51  pH, Blood: x     /  pCO2: 44    /  pO2: 415   / HCO3: 35    / Base Excess: 10.8  /  SaO2: 99.2      Chest x-ray 1/10/25  IMPRESSION:  Stable cardiac findings. Fluid is a left base again seen. Overall chest has improved from prior.

## 2025-01-10 NOTE — ED PROVIDER NOTE - NSICDXPASTMEDICALHX_GEN_ALL_CORE_FT
PAST MEDICAL HISTORY:  AICD (automatic cardioverter/defibrillator) present     CHF (congestive heart failure)     Chronic renal insufficiency     COPD (chronic obstructive pulmonary disease)     DM (diabetes mellitus), type 2     High cholesterol     HTN (hypertension)     Pacemaker     Pneumonia

## 2025-01-10 NOTE — H&P ADULT - REASON FOR ADMISSION
Acute on chronic combined HFpEF and HFrEF exacerbation Acute on chronic combined HFpEF and HFrEF exacerbation, suspected upper GI bleed Acute on chronic combined HFpEF and HFrEF exacerbation, acute blood loss anemia secondary to suspected upper GI bleed

## 2025-01-10 NOTE — ED ADULT TRIAGE NOTE - CHIEF COMPLAINT QUOTE
BIBEMS on CPAP c/o intermittent SOB for few days worse today with swelling to b/l LE and abdominal distension. PMH CHF, defibrillator, triple bypass.

## 2025-01-10 NOTE — CONSULT NOTE ADULT - SUBJECTIVE AND OBJECTIVE BOX
none         Patient chart reviewed, full consult to follow.     Thank you for the courtesy of this consultation.   [facility] NEPHROLOGY SERVICES, [facility]  NEPHROLOGY AND HYPERTENSION  [address]  [address]  [place] [address]  [phone]    MD DANIEL GARRETT MD YELENA ROSENBERG, MD BINNY KOSHY, MD CHRISTOPHER CAPUTO, MD EDWARD BOVER, MD      Information from chart:  "Patient is a 79y old  Male who presents with a chief complaint of Acute on chronic combined HFpEF and HFrEF exacerbation, acute blood loss anemia secondary to suspected upper GI bleed (11 Jan 2025 22:50)    HPI:  Arun Solares is an 79 year old male with PMHx of HTN, HLD, IDDM2, CAD (s/p CABG), pAF (on rivaroxaban), chronic combined HFpEF and HFrEF (LV EF 25-30% and grade III diastolic dysfunction on echo 8/9/24, s/p AICD placement), hx of severe aortic stenosis (s/p TAVR), chronic hypoxic respiratory failure secondary to COPD (baseline on 3-4L NC at home), CKD stage IV, and BPH who presented to the ED on 1/10/25 for complaints of shortness of breath.    History primarily obtained form wife at bedside. As per wife at bedside, patient is chronically short of breath and uses up to 4L NC continuously. However, for the past two weeks, he has been more short of breath and had increased fluid retention. States his left hand, abdomen, and leg have been very swollen. Sleeps on recliner as he is unable to lay flat. No paroxysmal nocturnal dyspnea. Reports intermittent productive cough with white/grey phlegm as well. Denies taking OTC antitussives. No recent travel or known sick contacts. Has been compliant with supplemental oxygen and diuretics. However, states he was discharged from [facility] in August 2025 with prescription of spironolactone 25 mg. Took 1-month supply and then stopped taking it as no other physician gave him another prescription for it. In addition, patient states he has had two episodes of black stools. Took ibuprofen a few days ago for hand spasms. Last took aspirin and xarelto today. States last colonoscopy was > 10 years ago and he had a few polyps removed at that time. Baseline functional status is ambulates unassisted and sometimes uses a walker. Independent with all ADLs. Lives at home with wife.     In the ED, VSS. No documented hypoxia but placed on BiPAP 15/6 on 100%. WBC 13.16K, hgb 6.5, sodium 132, BUN 61, Cr 2.71, blood glucose 256. Troponin WNL. Pro-BNP 4773. ABG 7.51 / 44 / 415 / 35 on BiPAP 15/6 on 100%. CXR personally reviewed; cardiomegaly, vascular congestion, blunting of L. costophrenic angle to suggest pleural effusion, appears unchanged from CXR 8/8/24. Received furosemide 40 mg IV. Evaluated by nephrology who recommends furosemide 40 mg IV daily given takes 40 mg PO BID at home. Requested ER physician to decrease FiO2 given O2 on ABG > 400.  (10 Tay 2025 18:09)   "    PAST MEDICAL & SURGICAL HISTORY:  HTN (hypertension)      COPD (chronic obstructive pulmonary disease)      HLD (hyperlipidemia)      Insulin dependent type 2 diabetes mellitus      Chronic hypoxic respiratory failure, on home oxygen therapy      Stage 4 chronic kidney disease      Chronic combined systolic and diastolic heart failure      Unspecified atrial fibrillation      S/P CABG x 3  cabg3  in 2003      S/P hernia repair  hernia [id number]      S/P implantation of automatic cardioverter/defibrillator (AICD)        FAMILY HISTORY:  FH: HTN (hypertension)      Allergies    No Known Allergies    Intolerances      Home Medications:  Altace 2.5 mg oral tablet: orally once a day (07 Aug 2024 22:26)  aspirin 81 mg oral delayed release tablet: 1 tab(s) orally once a day (15 Aug 2024 12:35)  Insulin Glargine: 15 unit(s) once a day (at bedtime) (07 Aug 2024 22:25)  metoprolol succinate 50 mg oral tablet, extended release: 1 tab(s) orally once a day (15 Aug 2024 12:35)  rivaroxaban 15 mg oral tablet: 1 tab(s) orally once a day (before a meal) (15 Aug 2024 12:35)  simvastatin 40 mg oral tablet: 1 tab(s) orally once a day (at bedtime) (14 Aug 2024 11:54)  tamsulosin 0.4 mg oral capsule: 1 cap(s) orally once a day (at bedtime) (07 Aug 2024 22:26)  Trulicity Pen 1.5 mg/0.5 mL subcutaneous solution: once a week (07 Aug 2024 22:26)    MEDICATIONS  (STANDING):  atorvastatin 20 milliGRAM(s) Oral at bedtime  dextrose 5%. 1000 milliLiter(s) (50 mL/Hr) IV Continuous <Continuous>  dextrose 5%. 1000 milliLiter(s) (100 mL/Hr) IV Continuous <Continuous>  dextrose 50% Injectable 25 Gram(s) IV Push once  dextrose 50% Injectable 12.5 Gram(s) IV Push once  dextrose 50% Injectable 25 Gram(s) IV Push once  fluticasone propionate/ salmeterol 250-50 MICROgram(s) Diskus 1 Dose(s) Inhalation two times a day  furosemide   Injectable 80 milliGRAM(s) IV Push every 12 hours  glucagon  Injectable 1 milliGRAM(s) IntraMuscular once  influenza  Vaccine (HIGH DOSE) 0.5 milliLiter(s) IntraMuscular once  insulin glargine Injectable (LANTUS) 15 Unit(s) SubCutaneous at bedtime  insulin lispro (ADMELOG) corrective regimen sliding scale   SubCutaneous three times a day before meals  pantoprazole  Injectable 40 milliGRAM(s) IV Push two times a day  tamsulosin 0.4 milliGRAM(s) Oral at bedtime  tiotropium 2.5 MICROgram(s) Inhaler 2 Puff(s) Inhalation daily    MEDICATIONS  (PRN):  acetaminophen     Tablet .. 650 milliGRAM(s) Oral every 6 hours PRN Temp greater or equal to 38C (100.4F), Mild Pain (1 - 3)  albuterol/ipratropium for Nebulization.. 3 milliLiter(s) Inhalation every 6 hours PRN -for shortness of breath and/or wheezing  aluminum hydroxide/magnesium hydroxide/simethicone Suspension 30 milliLiter(s) Oral every 4 hours PRN Dyspepsia  dextrose Oral Gel 15 Gram(s) Oral once PRN Blood Glucose LESS THAN 70 milliGRAM(s)/deciliter  melatonin 3 milliGRAM(s) Oral at bedtime PRN Insomnia  ondansetron Injectable 4 milliGRAM(s) IV Push every 8 hours PRN Nausea and/or Vomiting    Vital Signs Last 24 Hrs  T(C): 36.7 (11 Jan 2025 21:30), Max: 36.7 (11 Jan 2025 21:30)  T(F): 98.1 (11 Jan 2025 21:30), Max: 98.1 (11 Jan 2025 21:30)  HR: 72 (11 Jan 2025 21:30) (70 - 78)  BP: 124/81 (11 Jan 2025 21:30) (110/78 - 145/80)  BP(mean): --  RR: 18 (11 Jan 2025 21:30) (14 - 26)  SpO2: 100% (11 Jan 2025 21:30) (93% - 100%)    Parameters below as of 11 Jan 2025 21:30  Patient On (Oxygen Delivery Method): nasal cannula  O2 Flow (L/min): 3      Daily     Daily     01-10-25 @ 07:01  -  01-11-25 @ 07:00  --------------------------------------------------------  IN: 0 mL / OUT: 375 mL / NET: -375 mL      CAPILLARY BLOOD GLUCOSE      POCT Blood Glucose.: 253 mg/dL (11 Jan 2025 22:17)  POCT Blood Glucose.: 157 mg/dL (11 Jan 2025 16:41)  POCT Blood Glucose.: 120 mg/dL (11 Jan 2025 11:22)  POCT Blood Glucose.: 134 mg/dL (11 Jan 2025 08:11)  POCT Blood Glucose.: 181 mg/dL (11 Jan 2025 01:39)    PHYSICAL EXAM:      T(C): 36.7 (01-11-25 @ 21:30), Max: 36.7 (01-11-25 @ 21:30)  HR: 72 (01-11-25 @ 21:30) (70 - 78)  BP: 124/81 (01-11-25 @ 21:30) (110/78 - 145/80)  RR: 18 (01-11-25 @ 21:30) (14 - 26)  SpO2: 100% (01-11-25 @ 21:30) (93% - 100%)  Wt(kg): --  Lungs poor air entry  Heart S1S2  Abd soft NT ND  Extremities:   2  edema              01-11    135  |  95[L]  |  56[H]  ----------------------------<  129[H]  3.9   |  35[H]  |  2.22[H]    Ca    9.0      11 Jan 2025 07:36  Mg     2.7     01-11    TPro  6.7  /  Alb  2.7[L]  /  TBili  0.9  /  DBili  x   /  AST  27  /  ALT  20  /  AlkPhos  224[H]  01-10                          8.3    10.90 )-----------( 142      ( 11 Jan 2025 20:25 )             27.9     Creatinine Trend: 2.22<--, 2.71<--  Urinalysis Basic - ( 11 Jan 2025 07:36 )    Color: x / Appearance: x / SG: x / pH: x  Gluc: 129 mg/dL / Ketone: x  / Bili: x / Urobili: x   Blood: x / Protein: x / Nitrite: x   Leuk Esterase: x / RBC: x / WBC x   Sq Epi: x / Non Sq Epi: x / Bacteria: x      ABG - ( 10 Tay 2025 17:04 )  pH, Arterial: 7.51  pH, Blood: x     /  pCO2: 44    /  pO2: 415   / HCO3: 35    / Base Excess: 10.8  /  SaO2: 99.2                  Assessment   DENISHA CKD 3; CHF; COPD; medication nonadherence     Plan  IV diuretic regimen  Continue COPD care  Will follow     Jon Rowe MD

## 2025-01-10 NOTE — ED PROVIDER NOTE - CONSULTING PHYSICIAN
Pt's nephrologist Dr. Rowe is here eval pt now sts give pt lasix 40 mg ivp He sts pt takes lasix 80 mg bid

## 2025-01-10 NOTE — ED PROVIDER NOTE - MUSCULOSKELETAL, MLM
Spine appears normal, range of motion is not limited, no muscle or joint tenderness non tender to palp bilateral legs/cafls

## 2025-01-11 LAB
ANION GAP SERPL CALC-SCNC: 5 MMOL/L — SIGNIFICANT CHANGE UP (ref 5–17)
BUN SERPL-MCNC: 56 MG/DL — HIGH (ref 7–23)
CALCIUM SERPL-MCNC: 9 MG/DL — SIGNIFICANT CHANGE UP (ref 8.5–10.1)
CHLORIDE SERPL-SCNC: 95 MMOL/L — LOW (ref 96–108)
CO2 SERPL-SCNC: 35 MMOL/L — HIGH (ref 22–31)
CREAT SERPL-MCNC: 2.22 MG/DL — HIGH (ref 0.5–1.3)
EGFR: 29 ML/MIN/1.73M2 — LOW
FERRITIN SERPL-MCNC: 25 NG/ML — LOW (ref 30–400)
FOLATE SERPL-MCNC: >20 NG/ML — SIGNIFICANT CHANGE UP
GLUCOSE BLDC GLUCOMTR-MCNC: 120 MG/DL — HIGH (ref 70–99)
GLUCOSE BLDC GLUCOMTR-MCNC: 134 MG/DL — HIGH (ref 70–99)
GLUCOSE BLDC GLUCOMTR-MCNC: 157 MG/DL — HIGH (ref 70–99)
GLUCOSE BLDC GLUCOMTR-MCNC: 181 MG/DL — HIGH (ref 70–99)
GLUCOSE BLDC GLUCOMTR-MCNC: 253 MG/DL — HIGH (ref 70–99)
GLUCOSE SERPL-MCNC: 129 MG/DL — HIGH (ref 70–99)
HCT VFR BLD CALC: 27.9 % — LOW (ref 39–50)
HCT VFR BLD CALC: 28.7 % — LOW (ref 39–50)
HGB BLD-MCNC: 8.3 G/DL — LOW (ref 13–17)
HGB BLD-MCNC: 8.6 G/DL — LOW (ref 13–17)
IRON SATN MFR SERPL: 18 UG/DL — LOW (ref 45–165)
IRON SATN MFR SERPL: 4 % — LOW (ref 16–55)
MAGNESIUM SERPL-MCNC: 2.7 MG/DL — HIGH (ref 1.6–2.6)
MCHC RBC-ENTMCNC: 24.6 PG — LOW (ref 27–34)
MCHC RBC-ENTMCNC: 24.9 PG — LOW (ref 27–34)
MCHC RBC-ENTMCNC: 29.7 G/DL — LOW (ref 32–36)
MCHC RBC-ENTMCNC: 30 G/DL — LOW (ref 32–36)
MCV RBC AUTO: 82.5 FL — SIGNIFICANT CHANGE UP (ref 80–100)
MCV RBC AUTO: 83.2 FL — SIGNIFICANT CHANGE UP (ref 80–100)
NRBC # BLD: 0 /100 WBCS — SIGNIFICANT CHANGE UP (ref 0–0)
NRBC # BLD: 0 /100 WBCS — SIGNIFICANT CHANGE UP (ref 0–0)
NRBC BLD-RTO: 0 /100 WBCS — SIGNIFICANT CHANGE UP (ref 0–0)
NRBC BLD-RTO: 0 /100 WBCS — SIGNIFICANT CHANGE UP (ref 0–0)
PLATELET # BLD AUTO: 142 K/UL — LOW (ref 150–400)
PLATELET # BLD AUTO: 144 K/UL — LOW (ref 150–400)
POTASSIUM SERPL-MCNC: 3.9 MMOL/L — SIGNIFICANT CHANGE UP (ref 3.5–5.3)
POTASSIUM SERPL-SCNC: 3.9 MMOL/L — SIGNIFICANT CHANGE UP (ref 3.5–5.3)
RBC # BLD: 3.38 M/UL — LOW (ref 4.2–5.8)
RBC # BLD: 3.45 M/UL — LOW (ref 4.2–5.8)
RBC # FLD: 17.2 % — HIGH (ref 10.3–14.5)
RBC # FLD: 17.4 % — HIGH (ref 10.3–14.5)
SODIUM SERPL-SCNC: 135 MMOL/L — SIGNIFICANT CHANGE UP (ref 135–145)
TIBC SERPL-MCNC: 429 UG/DL — SIGNIFICANT CHANGE UP (ref 220–430)
TRANSFERRIN SERPL-MCNC: 341 MG/DL — SIGNIFICANT CHANGE UP (ref 200–360)
UIBC SERPL-MCNC: 411 UG/DL — HIGH (ref 110–370)
VIT B12 SERPL-MCNC: 1457 PG/ML — HIGH (ref 232–1245)
WBC # BLD: 10.39 K/UL — SIGNIFICANT CHANGE UP (ref 3.8–10.5)
WBC # BLD: 10.9 K/UL — HIGH (ref 3.8–10.5)
WBC # FLD AUTO: 10.39 K/UL — SIGNIFICANT CHANGE UP (ref 3.8–10.5)
WBC # FLD AUTO: 10.9 K/UL — HIGH (ref 3.8–10.5)

## 2025-01-11 PROCEDURE — 93971 EXTREMITY STUDY: CPT | Mod: 26,LT

## 2025-01-11 PROCEDURE — 99232 SBSQ HOSP IP/OBS MODERATE 35: CPT

## 2025-01-11 PROCEDURE — 99231 SBSQ HOSP IP/OBS SF/LOW 25: CPT

## 2025-01-11 RX ORDER — METOPROLOL SUCCINATE 25 MG
25 TABLET, EXTENDED RELEASE 24 HR ORAL DAILY
Refills: 0 | Status: DISCONTINUED | OUTPATIENT
Start: 2025-01-12 | End: 2025-02-05

## 2025-01-11 RX ADMIN — INSULIN GLARGINE-YFGN 15 UNIT(S): 100 INJECTION, SOLUTION SUBCUTANEOUS at 22:18

## 2025-01-11 RX ADMIN — FLUTICASONE PROPIONATE AND SALMETEROL 1 DOSE(S): 113; 14 POWDER, METERED RESPIRATORY (INHALATION) at 18:19

## 2025-01-11 RX ADMIN — PANTOPRAZOLE 40 MILLIGRAM(S): 20 TABLET, DELAYED RELEASE ORAL at 18:20

## 2025-01-11 RX ADMIN — TAMSULOSIN HYDROCHLORIDE 0.4 MILLIGRAM(S): 0.4 CAPSULE ORAL at 22:18

## 2025-01-11 RX ADMIN — TIOTROPIUM BROMIDE MONOHYDRATE 2 PUFF(S): 18 CAPSULE ORAL; RESPIRATORY (INHALATION) at 13:06

## 2025-01-11 RX ADMIN — PANTOPRAZOLE 40 MILLIGRAM(S): 20 TABLET, DELAYED RELEASE ORAL at 05:48

## 2025-01-11 RX ADMIN — Medication 40 MILLIGRAM(S): at 05:48

## 2025-01-11 RX ADMIN — Medication 1: at 16:51

## 2025-01-11 RX ADMIN — Medication 80 MILLIGRAM(S): at 18:20

## 2025-01-11 RX ADMIN — ATORVASTATIN CALCIUM 20 MILLIGRAM(S): 80 TABLET, FILM COATED ORAL at 22:18

## 2025-01-11 RX ADMIN — FLUTICASONE PROPIONATE AND SALMETEROL 1 DOSE(S): 113; 14 POWDER, METERED RESPIRATORY (INHALATION) at 05:49

## 2025-01-11 NOTE — PROGRESS NOTE ADULT - SUBJECTIVE AND OBJECTIVE BOX
[facility] NEPHROLOGY SERVICES, [facility]  NEPHROLOGY AND HYPERTENSION  [address]  [address]  [place] [address]  [phone]    MD DANIEL GARRETT MD YELENA ROSENBERG, MD BINNY KOSHY, MD CHRISTOPHER CAPUTO, MD EDWARD BOVER, MD          Patient events noted    MEDICATIONS  (STANDING):  atorvastatin 20 milliGRAM(s) Oral at bedtime  dextrose 5%. 1000 milliLiter(s) (50 mL/Hr) IV Continuous <Continuous>  dextrose 5%. 1000 milliLiter(s) (100 mL/Hr) IV Continuous <Continuous>  dextrose 50% Injectable 25 Gram(s) IV Push once  dextrose 50% Injectable 12.5 Gram(s) IV Push once  dextrose 50% Injectable 25 Gram(s) IV Push once  fluticasone propionate/ salmeterol 250-50 MICROgram(s) Diskus 1 Dose(s) Inhalation two times a day  furosemide   Injectable 80 milliGRAM(s) IV Push every 12 hours  glucagon  Injectable 1 milliGRAM(s) IntraMuscular once  influenza  Vaccine (HIGH DOSE) 0.5 milliLiter(s) IntraMuscular once  insulin glargine Injectable (LANTUS) 15 Unit(s) SubCutaneous at bedtime  insulin lispro (ADMELOG) corrective regimen sliding scale   SubCutaneous three times a day before meals  pantoprazole  Injectable 40 milliGRAM(s) IV Push two times a day  tamsulosin 0.4 milliGRAM(s) Oral at bedtime  tiotropium 2.5 MICROgram(s) Inhaler 2 Puff(s) Inhalation daily    MEDICATIONS  (PRN):  acetaminophen     Tablet .. 650 milliGRAM(s) Oral every 6 hours PRN Temp greater or equal to 38C (100.4F), Mild Pain (1 - 3)  albuterol/ipratropium for Nebulization.. 3 milliLiter(s) Inhalation every 6 hours PRN -for shortness of breath and/or wheezing  aluminum hydroxide/magnesium hydroxide/simethicone Suspension 30 milliLiter(s) Oral every 4 hours PRN Dyspepsia  dextrose Oral Gel 15 Gram(s) Oral once PRN Blood Glucose LESS THAN 70 milliGRAM(s)/deciliter  melatonin 3 milliGRAM(s) Oral at bedtime PRN Insomnia  ondansetron Injectable 4 milliGRAM(s) IV Push every 8 hours PRN Nausea and/or Vomiting      01-10-25 @ 07:01  -  01-11-25 @ 07:00  --------------------------------------------------------  IN: 0 mL / OUT: 375 mL / NET: -375 mL      PHYSICAL EXAM:      T(C): 36.7 (01-11-25 @ 21:30), Max: 36.7 (01-11-25 @ 21:30)  HR: 72 (01-11-25 @ 21:30) (70 - 78)  BP: 124/81 (01-11-25 @ 21:30) (110/78 - 145/80)  RR: 18 (01-11-25 @ 21:30) (14 - 26)  SpO2: 100% (01-11-25 @ 21:30) (93% - 100%)  Wt(kg): --  Lungs decreased BS bases  Heart S1S2  Abd soft NT ND  Extremities:   2-3 edema                                    8.3    10.90 )-----------( 142      ( 11 Jan 2025 20:25 )             27.9     01-11    135  |  95[L]  |  56[H]  ----------------------------<  129[H]  3.9   |  35[H]  |  2.22[H]    Ca    9.0      11 Jan 2025 07:36  Mg     2.7     01-11    TPro  6.7  /  Alb  2.7[L]  /  TBili  0.9  /  DBili  x   /  AST  27  /  ALT  20  /  AlkPhos  224[H]  01-10    ABG - ( 10 Tay 2025 17:04 )  pH, Arterial: 7.51  pH, Blood: x     /  pCO2: 44    /  pO2: 415   / HCO3: 35    / Base Excess: 10.8  /  SaO2: 99.2              LIVER FUNCTIONS - ( 10 Tay 2025 16:20 )  Alb: 2.7 g/dL / Pro: 6.7 gm/dL / ALK PHOS: 224 U/L / ALT: 20 U/L / AST: 27 U/L / GGT: x           Creatinine Trend: 2.22<--, 2.71<--    Assessment  DENISHA CKD 3; fluid overload, hx pulm HTN  Hx COPD  Suspected mediation non adherence     Plan   Continue diuretic rx  COPD maintenance     Jon Rowe MD

## 2025-01-11 NOTE — CHART NOTE - NSCHARTNOTEFT_GEN_A_CORE
Medicine PA Note    Notified by Rn that patient's report.  of Left Upper extremity have resulted .     US DPLX UPR EXT VEINS LTD LT   ORDERED BY: RASTA ARMSTRONG     PROCEDURE DATE:  01/11/2025          INTERPRETATION:  CLINICAL INFORMATION: Left arm swelling    COMPARISON: None available.    TECHNIQUE: Duplex sonography of the LEFT UPPER extremity veins with color   and spectral Doppler, with and without compression.    FINDINGS:    There is nonocclusive thrombus within the brachial and basilic vein which   are not fully compressible.    The left internal jugular, left subclavian, axillary, renal and ulnar   veins are patent without evidence of thrombus.    IMPRESSION:  Positive for thrombus within the left brachial and basilic vein    Discussed results with Dr Spencer . Patient currently admitted for anemia suspected UGIB. Patient was on eliquis. H/H Medicine PA Note    Notified by Rn that patient's report.  of Left Upper extremity have resulted .     US DPLX UPR EXT VEINS LTD LT   ORDERED BY: RASTA ARMSTRONG     PROCEDURE DATE:  01/11/2025          INTERPRETATION:  CLINICAL INFORMATION: Left arm swelling    COMPARISON: None available.    TECHNIQUE: Duplex sonography of the LEFT UPPER extremity veins with color   and spectral Doppler, with and without compression.    FINDINGS:    There is nonocclusive thrombus within the brachial and basilic vein which   are not fully compressible.    The left internal jugular, left subclavian, axillary, renal and ulnar   veins are patent without evidence of thrombus.    IMPRESSION:  Positive for thrombus within the left brachial and basilic vein    Discussed results with Dr Spencer . Patient currently admitted for anemia suspected UGIB. Patient was on rivaroxaban 15 mg for PAF. Patient received 2 units of packed RBCS 2/2 H/H 6.5/23.4 . Spoke with Surgical ELIZABETH Alvarez for vascular consult.    [facility]    Attestation statements:  Time-based billing (Non-critical care).    25 minutes spent on total encounter. The necessity of the time spent during the encounter on this date of service was due to:  -Ordering, reviewing, and interpreting labs, testing, and imaging.  -Independently obtaining a review of systems and performing physical exam      Time-based billing (NON- critical care). Total minutes spent:25

## 2025-01-11 NOTE — PROGRESS NOTE ADULT - ASSESSMENT
Arun Solares is an 79 year old male with PMHx of HTN, HLD, IDDM2, CAD (s/p CABG), pAF (on rivaroxaban), chronic combined HFpEF and HFrEF (LV EF 25-30% and grade III diastolic dysfunction on echo 8/9/24, s/p AICD placement), hx of severe aortic stenosis (s/p TAVR), chronic hypoxic respiratory failure secondary to COPD (baseline on 3-4L NC at home), CKD stage IV, and BPH who presented to the ED on 1/10/25 for complaints of shortness of breath and admitted for acute on chronic combined HFpEF and HFrEF exacerbation and acute blood loss anemia secondary to suspected upper GI bleed.    Acute on chronic combined HFpEF and HFrEF exacerbation  Reports increased shortness of breath and had increased fluid retention x 2 weeks  States his left hand, abdomen, and leg have been very swollen  Sleeps on recliner due to inability to lay flat, denies paroxysmal nocturnal dyspnea  Pro-BNP 4773 on admission, ABG 7.51 / 44 / 415 / 35 on BiPAP 15/6 on 100%  CXR personally reviewed; cardiomegaly, vascular congestion, blunting of L. costophrenic angle to suggest pleural effusion, appears unchanged from CXR 8/8/24  Prior TTE (on 8/8/24) with LV EF 25-30%, global LV hypokinesis, grade III diastolic dysfunction, moderate pHTN, mild MT, moderate TR   S/p furosemide 40 mg IV in the ED  Aggressive diuresis with furosemide 40 mg IV daily continued, strict Is and Os, daily weights, low salt diet, 2L fluid restriction  Continue GDMT with ACEi and beta-blockade  F/u echocardiogram, LUE venous doppler to r/o DVT  Monitor renal function and electrolytes while on IV diuresis  Telemetry    Acute blood loss anemia secondary to suspected upper GI bleed  Differential includes PUD (given recent NSAID use)  Reports increased shortness of breath x 2 months, likely component of SOB due to anemia given severity  Admits to two black bowel movements, most recent colonoscopy > 10 years ago and had polyps removed at that time  Hgb 6.5 on admission, baseline ~14? but most recent hgb 10.4 (on 11/25/24)  S/p type and screen in the ED  Two units pRBCs ordered by ER physician, monitor closely for signs of TACO given CHF  NPO at midnight for GI eval in AM, pantoprazole 40 mg IV BID started, no IVF given hypervolemia  F/u anemia panel, FOBT, post-transfusion CBC, transfuse for hgb < 8  Consult GI for consideration of EGD - please call in AM      Chronic medical conditions:  HTN: PTA metoprolol succinate 50 mg, ramipril 2.5 mg reordered as lisinopril 10 mg given ramipril not on formulary  HLD: PTA simvastatin 40 mg reordered as atorvastatin 20 mg given simvastatin not on formulary  IDDM2 with hyperglycemia: last known A1c 9.4 (on 11/25/24), blood glucose 256 on admission, POC qac and qhs, PTA Lantus 15 U qhs, low dose SSI qac started, blood glucose goal < 180  CAD (s/p CABG): PTA ASA 81 mg held due to suspected GI bleed  pAF (on rivaroxaban): PTA rivaroxaban 15 mg held due to suspected GI bleed  Chronic hypoxic respiratory failure secondary to COPD (baseline on 3-4L NC at home): PTA tiotropium, symbicort reordered as advair, duonebs PRN  CKD stage IV: BUN 61 and Cr 2.71 on admission, baseline Cr appears to fluctuate but Cr 2.92 (on 11/25/24), avoid nephrotoxins, renally dose meds, encourage PO hydration, pending official nephrology recs - please f/u in AM  BPH: PTA tamsulosin 0.4 mg    Medication reconciliation completed using discharge med list on 8/15/24. Patient reports he has not had any medication changes since then except he has not been taking spironolactone as no one refilled script for him.    Plan of care discussed with wife at bedside.  Arun Solares is an 79 year old male with PMHx of HTN, HLD, IDDM2, CAD (s/p CABG), pAF (on rivaroxaban), chronic combined HFpEF and HFrEF (LV EF 25-30% and grade III diastolic dysfunction on echo 8/9/24, s/p AICD placement), hx of severe aortic stenosis (s/p TAVR), chronic hypoxic respiratory failure secondary to COPD (baseline on 3-4L NC at home), CKD stage IV, and BPH who presented to the ED on 1/10/25 for complaints of shortness of breath and admitted for acute on chronic combined HFpEF and HFrEF exacerbation and acute blood loss anemia secondary to suspected upper GI bleed.    Acute on chronic combined HFpEF and HFrEF exacerbation  Likely due to non compliance with medications particularly diuretics as per wife  Reports increased shortness of breath and had increased fluid retention x 2 weeks  States his left hand, abdomen, and leg have been very swollen  Sleeps on recliner due to inability to lay flat, denies paroxysmal nocturnal dyspnea  Pro-BNP 4773 on admission, ABG 7.51 / 44 / 415 / 35 on BiPAP 15/6 on 100%  CXR   Prior TTE (on 8/8/24) with LV EF 25-30%, global LV hypokinesis, grade III diastolic dysfunction, moderate pHTN, mild NV, moderate TR   S/p furosemide 40 mg IV in the ED  Aggressive diuresis with furosemide 40 mg IV daily continued, strict Is and Os, daily weights, low salt diet, 2L fluid restriction  Continue GDMT with ACEi and beta-blockade  Monitor renal function and electrolytes while on IV diuresis  Telemetry    Acute blood loss anemia secondary to suspected upper GI bleed  Differential includes PUD (given recent NSAID use)  Reports increased shortness of breath x 2 months, likely component of SOB due to anemia given severity  Admits to two black bowel movements, most recent colonoscopy > 10 years ago and had polyps removed at that time  Hgb 6.5 on admission, baseline ~14? but most recent hgb 10.4 (on 11/25/24)>>8.5 s/p 2PRBCs  S/p type and screen in the EDTwo units pRBCs ordered by ER physician, monitor closely for signs of TACO given CHF  f/u stool occult  cbc q12          Chronic medical conditions:  HTN: PTA metoprolol succinate 50 mg, ramipril 2.5 mg reordered as lisinopril 10 mg given ramipril not on formulary  HLD: PTA simvastatin 40 mg reordered as atorvastatin 20 mg given simvastatin not on formulary  IDDM2 with hyperglycemia: last known A1c 9.4 (on 11/25/24), blood glucose 256 on admission, POC qac and qhs, PTA Lantus 15 U qhs, low dose SSI qac started, blood glucose goal < 180  CAD (s/p CABG): PTA ASA 81 mg held due to suspected GI bleed  pAF (on rivaroxaban): PTA rivaroxaban 15 mg held due to suspected GI bleed  Chronic hypoxic respiratory failure secondary to COPD (baseline on 3-4L NC at home): PTA tiotropium, symbicort reordered as advair, duonebs PRN  CKD stage IV: BUN 61 and Cr 2.71 on admission, baseline Cr appears to fluctuate but Cr 2.92 (on 11/25/24), avoid nephrotoxins, renally dose meds, encourage PO hydration, pending official nephrology recs - please f/u in AM  BPH: PTA tamsulosin 0.4 mg    Wife updated 1/11

## 2025-01-11 NOTE — PATIENT PROFILE ADULT - FUNCTIONAL ASSESSMENT - BASIC MOBILITY 6.
2-calculated by average /Not able to assess (calculate score using [facility] averaging method)

## 2025-01-12 LAB
ALBUMIN SERPL ELPH-MCNC: 2.7 G/DL — LOW (ref 3.3–5)
ALP SERPL-CCNC: 202 U/L — HIGH (ref 40–120)
ALT FLD-CCNC: 19 U/L — SIGNIFICANT CHANGE UP (ref 12–78)
ANION GAP SERPL CALC-SCNC: 5 MMOL/L — SIGNIFICANT CHANGE UP (ref 5–17)
AST SERPL-CCNC: 24 U/L — SIGNIFICANT CHANGE UP (ref 15–37)
BILIRUB SERPL-MCNC: 1.4 MG/DL — HIGH (ref 0.2–1.2)
BUN SERPL-MCNC: 57 MG/DL — HIGH (ref 7–23)
CALCIUM SERPL-MCNC: 8.7 MG/DL — SIGNIFICANT CHANGE UP (ref 8.5–10.1)
CHLORIDE SERPL-SCNC: 96 MMOL/L — SIGNIFICANT CHANGE UP (ref 96–108)
CO2 SERPL-SCNC: 33 MMOL/L — HIGH (ref 22–31)
CREAT SERPL-MCNC: 2.55 MG/DL — HIGH (ref 0.5–1.3)
EGFR: 25 ML/MIN/1.73M2 — LOW
GLUCOSE BLDC GLUCOMTR-MCNC: 163 MG/DL — HIGH (ref 70–99)
GLUCOSE BLDC GLUCOMTR-MCNC: 270 MG/DL — HIGH (ref 70–99)
GLUCOSE BLDC GLUCOMTR-MCNC: 321 MG/DL — HIGH (ref 70–99)
GLUCOSE BLDC GLUCOMTR-MCNC: 329 MG/DL — HIGH (ref 70–99)
GLUCOSE SERPL-MCNC: 243 MG/DL — HIGH (ref 70–99)
HCT VFR BLD CALC: 28.3 % — LOW (ref 39–50)
HGB BLD-MCNC: 8.4 G/DL — LOW (ref 13–17)
MCHC RBC-ENTMCNC: 24.6 PG — LOW (ref 27–34)
MCHC RBC-ENTMCNC: 29.7 G/DL — LOW (ref 32–36)
MCV RBC AUTO: 83 FL — SIGNIFICANT CHANGE UP (ref 80–100)
NRBC # BLD: 0 /100 WBCS — SIGNIFICANT CHANGE UP (ref 0–0)
NRBC BLD-RTO: 0 /100 WBCS — SIGNIFICANT CHANGE UP (ref 0–0)
OB PNL STL: POSITIVE
PLATELET # BLD AUTO: 142 K/UL — LOW (ref 150–400)
POTASSIUM SERPL-MCNC: 3.9 MMOL/L — SIGNIFICANT CHANGE UP (ref 3.5–5.3)
POTASSIUM SERPL-SCNC: 3.9 MMOL/L — SIGNIFICANT CHANGE UP (ref 3.5–5.3)
PROT SERPL-MCNC: 6.4 GM/DL — SIGNIFICANT CHANGE UP (ref 6–8.3)
RBC # BLD: 3.41 M/UL — LOW (ref 4.2–5.8)
RBC # FLD: 17.6 % — HIGH (ref 10.3–14.5)
SODIUM SERPL-SCNC: 134 MMOL/L — LOW (ref 135–145)
WBC # BLD: 11.12 K/UL — HIGH (ref 3.8–10.5)
WBC # FLD AUTO: 11.12 K/UL — HIGH (ref 3.8–10.5)

## 2025-01-12 PROCEDURE — 99232 SBSQ HOSP IP/OBS MODERATE 35: CPT

## 2025-01-12 RX ADMIN — ATORVASTATIN CALCIUM 20 MILLIGRAM(S): 80 TABLET, FILM COATED ORAL at 21:27

## 2025-01-12 RX ADMIN — TAMSULOSIN HYDROCHLORIDE 0.4 MILLIGRAM(S): 0.4 CAPSULE ORAL at 21:27

## 2025-01-12 RX ADMIN — PANTOPRAZOLE 40 MILLIGRAM(S): 20 TABLET, DELAYED RELEASE ORAL at 17:08

## 2025-01-12 RX ADMIN — INSULIN GLARGINE-YFGN 15 UNIT(S): 100 INJECTION, SOLUTION SUBCUTANEOUS at 21:26

## 2025-01-12 RX ADMIN — Medication 3: at 12:04

## 2025-01-12 RX ADMIN — Medication 80 MILLIGRAM(S): at 06:07

## 2025-01-12 RX ADMIN — TIOTROPIUM BROMIDE MONOHYDRATE 2 PUFF(S): 18 CAPSULE ORAL; RESPIRATORY (INHALATION) at 16:15

## 2025-01-12 RX ADMIN — Medication 80 MILLIGRAM(S): at 17:08

## 2025-01-12 RX ADMIN — Medication 1: at 08:36

## 2025-01-12 RX ADMIN — PANTOPRAZOLE 40 MILLIGRAM(S): 20 TABLET, DELAYED RELEASE ORAL at 06:06

## 2025-01-12 RX ADMIN — Medication 4: at 17:08

## 2025-01-12 RX ADMIN — Medication 25 MILLIGRAM(S): at 06:11

## 2025-01-12 RX ADMIN — FLUTICASONE PROPIONATE AND SALMETEROL 1 DOSE(S): 113; 14 POWDER, METERED RESPIRATORY (INHALATION) at 06:52

## 2025-01-12 RX ADMIN — FLUTICASONE PROPIONATE AND SALMETEROL 1 DOSE(S): 113; 14 POWDER, METERED RESPIRATORY (INHALATION) at 17:08

## 2025-01-12 NOTE — PROGRESS NOTE ADULT - ASSESSMENT
Arun Solares is an 79 year old male with PMHx of HTN, HLD, IDDM2, CAD (s/p CABG), pAF (on rivaroxaban), chronic combined HFpEF and HFrEF (LV EF 25-30% and grade III diastolic dysfunction on echo 8/9/24, s/p AICD placement), hx of severe aortic stenosis (s/p TAVR), chronic hypoxic respiratory failure secondary to COPD (baseline on 3-4L NC at home), CKD stage IV, and BPH who presented to the ED on 1/10/25 for complaints of shortness of breath and admitted for acute on chronic combined HFpEF and HFrEF exacerbation and acute blood loss anemia secondary to suspected upper GI bleed.    Acute on chronic combined HFpEF and HFrEF exacerbation  Likely due to non compliance with medications particularly diuretics as per wife  Reports increased shortness of breath and had increased fluid retention x 2 weeks  States his left hand, abdomen, and leg have been very swollen  Sleeps on recliner due to inability to lay flat, denies paroxysmal nocturnal dyspnea  Pro-BNP 4773 on admission, ABG 7.51 / 44 / 415 / 35 on BiPAP 15/6 on 100%  CXR   Prior TTE (on 8/8/24) with LV EF 25-30%, global LV hypokinesis, grade III diastolic dysfunction, moderate pHTN, mild SD, moderate TR   S/p furosemide 40 mg IV in the ED  Aggressive diuresis with furosemide 40 mg IV daily continued, strict Is and Os, daily weights, low salt diet, 2L fluid restriction  Continue GDMT with ACEi and beta-blockade  Monitor renal function and electrolytes while on IV diuresis  Telemetry    Acute blood loss anemia secondary to suspected upper GI bleed  Differential includes PUD (given recent NSAID use)  Reports increased shortness of breath x 2 months, likely component of SOB due to anemia given severity  Admits to two black bowel movements, most recent colonoscopy > 10 years ago and had polyps removed at that time  Hgb 6.5 on admission, baseline ~14? but most recent hgb 10.4 (on 11/25/24)>>8.5 s/p 2PRBCs  S/p type and screen in the EDTwo units pRBCs ordered by ER physician, monitor closely for signs of TACO given CHF  f/u stool occult  cbc q12          Chronic medical conditions:  HTN: PTA metoprolol succinate 50 mg, ramipril 2.5 mg reordered as lisinopril 10 mg given ramipril not on formulary  HLD: PTA simvastatin 40 mg reordered as atorvastatin 20 mg given simvastatin not on formulary  IDDM2 with hyperglycemia: last known A1c 9.4 (on 11/25/24), blood glucose 256 on admission, POC qac and qhs, PTA Lantus 15 U qhs, low dose SSI qac started, blood glucose goal < 180  CAD (s/p CABG): PTA ASA 81 mg held due to suspected GI bleed  pAF (on rivaroxaban): PTA rivaroxaban 15 mg held due to suspected GI bleed  Chronic hypoxic respiratory failure secondary to COPD (baseline on 3-4L NC at home): PTA tiotropium, symbicort reordered as advair, duonebs PRN  CKD stage IV: BUN 61 and Cr 2.71 on admission, baseline Cr appears to fluctuate but Cr 2.92 (on 11/25/24), avoid nephrotoxins, renally dose meds, encourage PO hydration, pending official nephrology recs - please f/u in AM  BPH: PTA tamsulosin 0.4 mg    Wife updated 1/11     Arun Solares is an 79 year old male with PMHx of HTN, HLD, IDDM2, CAD (s/p CABG), pAF (on rivaroxaban), chronic combined HFpEF and HFrEF (LV EF 25-30% and grade III diastolic dysfunction on echo 8/9/24, s/p AICD placement), hx of severe aortic stenosis (s/p TAVR), chronic hypoxic respiratory failure secondary to COPD (baseline on 3-4L NC at home), CKD stage IV, and BPH who presented to the ED on 1/10/25 for complaints of shortness of breath and admitted for acute on chronic combined HFpEF and HFrEF exacerbation and acute blood loss anemia secondary to suspected upper GI bleed.    Acute on chronic combined HFpEF and HFrEF exacerbation  Likely due to non compliance with medications particularly diuretics as per wife  States his left hand, abdomen, and leg have been very swollen  Sleeps on recliner due to inability to lay flat, denies paroxysmal nocturnal dyspnea  Pro-BNP 4773 on admission,   Prior TTE (on 8/8/24) with LV EF 25-30%, global LV hypokinesis, grade III diastolic dysfunction, moderate pHTN, mild RI, moderate TR   Aggressive diuresis with furosemide 80 ibv BID as per nephro  strict Is and Os, daily weights, low salt diet, 2L fluid restriction  Continue GDMT with ACEi and beta-blockade  Monitor renal function and electrolytes while on IV diuresis  Telemetry    Acute blood loss anemia secondary to suspected upper GI bleed  Differential includes PUD (given recent NSAID use)  Reports increased shortness of breath x 2 months, likely component of SOB due to anemia given severity  Admits to two black bowel movements, most recent colonoscopy > 10 years ago and had polyps removed at that time  Hgb 6.5 on admission, baseline ~14? but most recent hgb 10.4 (on 11/25/24)>>8.5 s/p 2PRBCs  S/p Two units pRBCs in ED  f/u stool occult  hgb has been stable, c/w cbc q12          Chronic medical conditions:  HTN: PTA metoprolol succinate 50 mg, ramipril 2.5 mg reordered as lisinopril 10 mg given ramipril not on formulary  HLD: PTA simvastatin 40 mg reordered as atorvastatin 20 mg given simvastatin not on formulary  IDDM2 with hyperglycemia: last known A1c 9.4 (on 11/25/24), blood glucose 256 on admission, POC qac and qhs, PTA Lantus 15 U qhs, low dose SSI qac started, blood glucose goal < 180  CAD (s/p CABG): PTA ASA 81 mg held due to suspected GI bleed  pAF (on rivaroxaban): PTA rivaroxaban 15 mg held due to suspected GI bleed  Chronic hypoxic respiratory failure secondary to COPD (baseline on 3-4L NC at home): PTA tiotropium, symbicort reordered as advair, duonebs PRN  CKD stage IV: BUN 61 and Cr 2.71 on admission, baseline Cr appears to fluctuate but Cr 2.92 (on 11/25/24), avoid nephrotoxins, renally dose meds, encourage PO hydration, pending official nephrology recs - please f/u in AM  BPH: PTA tamsulosin 0.4 mg    Wife updated 1/11

## 2025-01-12 NOTE — PROGRESS NOTE ADULT - SUBJECTIVE AND OBJECTIVE BOX
Pt seen and examined at bedside. No acute events overnight. AAOx3. Denies any fever chills, chest pain, GI/ sx, or any other active complaints. On CPAP at night    T(C): 36.8 (12 Jan 2025 10:59), Max: 36.8 (12 Jan 2025 10:59)  T(F): 98.3 (12 Jan 2025 10:59), Max: 98.3 (12 Jan 2025 10:59)  HR: 70 (12 Jan 2025 10:59) (69 - 78)  BP: 119/68 (12 Jan 2025 10:59) (111/76 - 124/81)  RR: 18 (12 Jan 2025 10:59) (14 - 18)  SpO2: 97% (12 Jan 2025 10:59) (96% - 100%)    O2 Parameters below as of 11 Jan 2025 21:30  Patient On (Oxygen Delivery Method): nasal cannula  O2 Flow (L/min): 3    GENERAL: NAD  HEAD:  Atraumatic, normocephalic  EYES: EOMI, PERRL  NECK: Supple, trachea midline, no JVD  HEART: Regular rate and rhythm  LUNGS: Unlabored respirations. Clear to auscultation bilaterally, no crackles, wheezing, or rhonchi  ABDOMEN: Soft, nontender, nondistended, +BS  EXTREMITIES:1+ RUE edema,  2+LE edema  NERVOUS SYSTEM:  A&Ox3, moving all extremities, no focal deficits

## 2025-01-13 DIAGNOSIS — I50.9 HEART FAILURE, UNSPECIFIED: ICD-10-CM

## 2025-01-13 DIAGNOSIS — Z51.5 ENCOUNTER FOR PALLIATIVE CARE: ICD-10-CM

## 2025-01-13 DIAGNOSIS — R53.1 WEAKNESS: ICD-10-CM

## 2025-01-13 LAB
ALBUMIN SERPL ELPH-MCNC: 2.6 G/DL — LOW (ref 3.3–5)
ALP SERPL-CCNC: 236 U/L — HIGH (ref 40–120)
ALT FLD-CCNC: 20 U/L — SIGNIFICANT CHANGE UP (ref 12–78)
ANION GAP SERPL CALC-SCNC: 7 MMOL/L — SIGNIFICANT CHANGE UP (ref 5–17)
AST SERPL-CCNC: 30 U/L — SIGNIFICANT CHANGE UP (ref 15–37)
BASOPHILS # BLD AUTO: 0.02 K/UL — SIGNIFICANT CHANGE UP (ref 0–0.2)
BASOPHILS NFR BLD AUTO: 0.2 % — SIGNIFICANT CHANGE UP (ref 0–2)
BILIRUB SERPL-MCNC: 1.1 MG/DL — SIGNIFICANT CHANGE UP (ref 0.2–1.2)
BUN SERPL-MCNC: 56 MG/DL — HIGH (ref 7–23)
CALCIUM SERPL-MCNC: 8.3 MG/DL — LOW (ref 8.5–10.1)
CHLORIDE SERPL-SCNC: 96 MMOL/L — SIGNIFICANT CHANGE UP (ref 96–108)
CO2 SERPL-SCNC: 34 MMOL/L — HIGH (ref 22–31)
CREAT SERPL-MCNC: 2.51 MG/DL — HIGH (ref 0.5–1.3)
EGFR: 25 ML/MIN/1.73M2 — LOW
EOSINOPHIL # BLD AUTO: 0.15 K/UL — SIGNIFICANT CHANGE UP (ref 0–0.5)
EOSINOPHIL NFR BLD AUTO: 1.5 % — SIGNIFICANT CHANGE UP (ref 0–6)
GLUCOSE BLDC GLUCOMTR-MCNC: 308 MG/DL — HIGH (ref 70–99)
GLUCOSE BLDC GLUCOMTR-MCNC: 334 MG/DL — HIGH (ref 70–99)
GLUCOSE BLDC GLUCOMTR-MCNC: 353 MG/DL — HIGH (ref 70–99)
GLUCOSE BLDC GLUCOMTR-MCNC: 371 MG/DL — HIGH (ref 70–99)
GLUCOSE SERPL-MCNC: 284 MG/DL — HIGH (ref 70–99)
HCT VFR BLD CALC: 27.9 % — LOW (ref 39–50)
HGB BLD-MCNC: 8.1 G/DL — LOW (ref 13–17)
IMM GRANULOCYTES NFR BLD AUTO: 0.4 % — SIGNIFICANT CHANGE UP (ref 0–0.9)
LYMPHOCYTES # BLD AUTO: 1.01 K/UL — SIGNIFICANT CHANGE UP (ref 1–3.3)
LYMPHOCYTES # BLD AUTO: 10.3 % — LOW (ref 13–44)
MCHC RBC-ENTMCNC: 24.3 PG — LOW (ref 27–34)
MCHC RBC-ENTMCNC: 29 G/DL — LOW (ref 32–36)
MCV RBC AUTO: 83.5 FL — SIGNIFICANT CHANGE UP (ref 80–100)
MONOCYTES # BLD AUTO: 1.36 K/UL — HIGH (ref 0–0.9)
MONOCYTES NFR BLD AUTO: 13.8 % — SIGNIFICANT CHANGE UP (ref 2–14)
NEUTROPHILS # BLD AUTO: 7.24 K/UL — SIGNIFICANT CHANGE UP (ref 1.8–7.4)
NEUTROPHILS NFR BLD AUTO: 73.8 % — SIGNIFICANT CHANGE UP (ref 43–77)
NRBC # BLD: 0 /100 WBCS — SIGNIFICANT CHANGE UP (ref 0–0)
NRBC BLD-RTO: 0 /100 WBCS — SIGNIFICANT CHANGE UP (ref 0–0)
PLATELET # BLD AUTO: 129 K/UL — LOW (ref 150–400)
POTASSIUM SERPL-MCNC: 3.6 MMOL/L — SIGNIFICANT CHANGE UP (ref 3.5–5.3)
POTASSIUM SERPL-SCNC: 3.6 MMOL/L — SIGNIFICANT CHANGE UP (ref 3.5–5.3)
PROT SERPL-MCNC: 6.2 GM/DL — SIGNIFICANT CHANGE UP (ref 6–8.3)
RBC # BLD: 3.34 M/UL — LOW (ref 4.2–5.8)
RBC # FLD: 17.5 % — HIGH (ref 10.3–14.5)
SODIUM SERPL-SCNC: 137 MMOL/L — SIGNIFICANT CHANGE UP (ref 135–145)
WBC # BLD: 9.82 K/UL — SIGNIFICANT CHANGE UP (ref 3.8–10.5)
WBC # FLD AUTO: 9.82 K/UL — SIGNIFICANT CHANGE UP (ref 3.8–10.5)

## 2025-01-13 PROCEDURE — 99223 1ST HOSP IP/OBS HIGH 75: CPT

## 2025-01-13 PROCEDURE — 99497 ADVNCD CARE PLAN 30 MIN: CPT | Mod: 25

## 2025-01-13 PROCEDURE — 99233 SBSQ HOSP IP/OBS HIGH 50: CPT

## 2025-01-13 RX ORDER — INSULIN GLARGINE-YFGN 100 [IU]/ML
10 INJECTION, SOLUTION SUBCUTANEOUS ONCE
Refills: 0 | Status: COMPLETED | OUTPATIENT
Start: 2025-01-13 | End: 2025-01-13

## 2025-01-13 RX ORDER — IRON SUCROSE 20 MG/ML
200 INJECTION, SOLUTION INTRAVENOUS
Refills: 0 | Status: COMPLETED | OUTPATIENT
Start: 2025-01-13 | End: 2025-01-17

## 2025-01-13 RX ADMIN — Medication 80 MILLIGRAM(S): at 21:01

## 2025-01-13 RX ADMIN — FLUTICASONE PROPIONATE AND SALMETEROL 1 DOSE(S): 113; 14 POWDER, METERED RESPIRATORY (INHALATION) at 06:16

## 2025-01-13 RX ADMIN — INSULIN GLARGINE-YFGN 15 UNIT(S): 100 INJECTION, SOLUTION SUBCUTANEOUS at 21:19

## 2025-01-13 RX ADMIN — Medication 25 MILLIGRAM(S): at 06:15

## 2025-01-13 RX ADMIN — FLUTICASONE PROPIONATE AND SALMETEROL 1 DOSE(S): 113; 14 POWDER, METERED RESPIRATORY (INHALATION) at 19:20

## 2025-01-13 RX ADMIN — PANTOPRAZOLE 40 MILLIGRAM(S): 20 TABLET, DELAYED RELEASE ORAL at 17:01

## 2025-01-13 RX ADMIN — IPRATROPIUM BROMIDE AND ALBUTEROL SULFATE 3 MILLILITER(S): .5; 2.5 SOLUTION RESPIRATORY (INHALATION) at 06:19

## 2025-01-13 RX ADMIN — Medication 80 MILLIGRAM(S): at 13:26

## 2025-01-13 RX ADMIN — Medication 80 MILLIGRAM(S): at 00:34

## 2025-01-13 RX ADMIN — Medication 5: at 17:01

## 2025-01-13 RX ADMIN — TIOTROPIUM BROMIDE MONOHYDRATE 2 PUFF(S): 18 CAPSULE ORAL; RESPIRATORY (INHALATION) at 12:19

## 2025-01-13 RX ADMIN — TAMSULOSIN HYDROCHLORIDE 0.4 MILLIGRAM(S): 0.4 CAPSULE ORAL at 21:01

## 2025-01-13 RX ADMIN — INSULIN GLARGINE-YFGN 10 UNIT(S): 100 INJECTION, SOLUTION SUBCUTANEOUS at 11:35

## 2025-01-13 RX ADMIN — Medication 80 MILLIGRAM(S): at 06:15

## 2025-01-13 RX ADMIN — PANTOPRAZOLE 40 MILLIGRAM(S): 20 TABLET, DELAYED RELEASE ORAL at 06:15

## 2025-01-13 RX ADMIN — IRON SUCROSE 200 MILLIGRAM(S): 20 INJECTION, SOLUTION INTRAVENOUS at 13:26

## 2025-01-13 RX ADMIN — ATORVASTATIN CALCIUM 20 MILLIGRAM(S): 80 TABLET, FILM COATED ORAL at 21:01

## 2025-01-13 RX ADMIN — Medication 4: at 08:01

## 2025-01-13 RX ADMIN — Medication 5: at 11:45

## 2025-01-13 NOTE — CONSULT NOTE ADULT - PROBLEM SELECTOR RECOMMENDATION 6
See GOC conversation above.  Patient with HCP on file naming his wife as primary HCA.  Deferred decisions about LST to his wife and MOLST drafted indicating DNR/I/trial of NIV/No feeding tube.  Wishes to continue with current management.  Palliative available as needed.     Message sent to Dr. Le

## 2025-01-13 NOTE — PROGRESS NOTE ADULT - SUBJECTIVE AND OBJECTIVE BOX
Patient is a 79y old  Male who presents with a chief complaint of Acute on chronic combined HFpEF and HFrEF exacerbation, acute blood loss anemia secondary to suspected upper GI bleed (13 Jan 2025 15:16)    INTERVAL HPI/OVERNIGHT EVENTS: Patients seen and examined at bedside this morning. No acute events overnight. Pt reports swelling looks a little better on the hands.  MEDICATIONS  (STANDING):  atorvastatin 20 milliGRAM(s) Oral at bedtime  dextrose 5%. 1000 milliLiter(s) (50 mL/Hr) IV Continuous <Continuous>  dextrose 5%. 1000 milliLiter(s) (100 mL/Hr) IV Continuous <Continuous>  dextrose 50% Injectable 25 Gram(s) IV Push once  dextrose 50% Injectable 12.5 Gram(s) IV Push once  dextrose 50% Injectable 25 Gram(s) IV Push once  fluticasone propionate/ salmeterol 250-50 MICROgram(s) Diskus 1 Dose(s) Inhalation two times a day  furosemide   Injectable 80 milliGRAM(s) IV Push every 8 hours  glucagon  Injectable 1 milliGRAM(s) IntraMuscular once  influenza  Vaccine (HIGH DOSE) 0.5 milliLiter(s) IntraMuscular once  insulin glargine Injectable (LANTUS) 15 Unit(s) SubCutaneous at bedtime  insulin lispro (ADMELOG) corrective regimen sliding scale   SubCutaneous three times a day before meals  iron sucrose Injectable 200 milliGRAM(s) IV Push <User Schedule>  metoprolol succinate ER 25 milliGRAM(s) Oral daily  pantoprazole  Injectable 40 milliGRAM(s) IV Push two times a day  tamsulosin 0.4 milliGRAM(s) Oral at bedtime  tiotropium 2.5 MICROgram(s) Inhaler 2 Puff(s) Inhalation daily    MEDICATIONS  (PRN):  acetaminophen     Tablet .. 650 milliGRAM(s) Oral every 6 hours PRN Temp greater or equal to 38C (100.4F), Mild Pain (1 - 3)  albuterol/ipratropium for Nebulization.. 3 milliLiter(s) Inhalation every 6 hours PRN -for shortness of breath and/or wheezing  aluminum hydroxide/magnesium hydroxide/simethicone Suspension 30 milliLiter(s) Oral every 4 hours PRN Dyspepsia  dextrose Oral Gel 15 Gram(s) Oral once PRN Blood Glucose LESS THAN 70 milliGRAM(s)/deciliter  melatonin 3 milliGRAM(s) Oral at bedtime PRN Insomnia  ondansetron Injectable 4 milliGRAM(s) IV Push every 8 hours PRN Nausea and/or Vomiting    Allergies    No Known Allergies    Intolerances      REVIEW OF SYSTEMS:  All other systems reviewed and are negative    Vital Signs Last 24 Hrs  T(C): 36.8 (13 Jan 2025 10:11), Max: 36.9 (12 Jan 2025 15:41)  T(F): 98.3 (13 Jan 2025 10:11), Max: 98.5 (12 Jan 2025 15:41)  HR: 72 (13 Jan 2025 10:11) (70 - 74)  BP: 120/60 (13 Jan 2025 10:11) (112/63 - 129/69)  BP(mean): --  RR: 18 (13 Jan 2025 10:11) (18 - 18)  SpO2: 95% (13 Jan 2025 10:11) (93% - 99%)    Parameters below as of 13 Jan 2025 06:19  Patient On (Oxygen Delivery Method): nasal cannula      Daily     Daily   I&O's Summary    12 Jan 2025 07:01  -  13 Jan 2025 07:00  --------------------------------------------------------  IN: 240 mL / OUT: 0 mL / NET: 240 mL    13 Jan 2025 07:01  -  13 Jan 2025 15:40  --------------------------------------------------------  IN: 320 mL / OUT: 700 mL / NET: -380 mL      CAPILLARY BLOOD GLUCOSE      POCT Blood Glucose.: 353 mg/dL (13 Jan 2025 11:13)  POCT Blood Glucose.: 308 mg/dL (13 Jan 2025 07:42)  POCT Blood Glucose.: 329 mg/dL (12 Jan 2025 20:54)  POCT Blood Glucose.: 321 mg/dL (12 Jan 2025 16:43)    PHYSICAL EXAM:  GENERAL: NAD  HEAD:  Atraumatic, normocephalic  EYES: EOMI, PERRL  NECK: Supple, trachea midline, no JVD  HEART: Regular rate and rhythm  LUNGS: Unlabored respirations. Clear to auscultation bilaterally, no crackles, wheezing, or rhonchi  ABDOMEN: Soft, nontender, nondistended, +BS  EXTREMITIES:1+ RUE edema,  2+LE edema  NERVOUS SYSTEM:  A&Ox3, moving all extremities, no focal deficits       Labs                          8.1    9.82  )-----------( 129      ( 13 Jan 2025 07:15 )             27.9     01-13    137  |  96  |  56[H]  ----------------------------<  284[H]  3.6   |  34[H]  |  2.51[H]    Ca    8.3[L]      13 Jan 2025 07:15    TPro  6.2  /  Alb  2.6[L]  /  TBili  1.1  /  DBili  x   /  AST  30  /  ALT  20  /  AlkPhos  236[H]  01-13          Urinalysis Basic - ( 13 Jan 2025 07:15 )    Color: x / Appearance: x / SG: x / pH: x  Gluc: 284 mg/dL / Ketone: x  / Bili: x / Urobili: x   Blood: x / Protein: x / Nitrite: x   Leuk Esterase: x / RBC: x / WBC x   Sq Epi: x / Non Sq Epi: x / Bacteria: x                      Radiology and Imaging reviewed.

## 2025-01-13 NOTE — CONSULT NOTE ADULT - PROBLEM SELECTOR RECOMMENDATION 2
reports using motrin recently for gout pain, also on AC for atrial fib and took asa for pain  FOBT positive and s/p PRBCs, iron ordered and protonix  given CKD and AC use, discussed not taking NSAIDs

## 2025-01-13 NOTE — PROGRESS NOTE ADULT - ASSESSMENT
Arun Solares is an 79 year old male with PMHx of HTN, HLD, IDDM2, CAD (s/p CABG), pAF (on rivaroxaban), chronic combined HFpEF and HFrEF (LV EF 25-30% and grade III diastolic dysfunction on echo 8/9/24, s/p AICD placement), hx of severe aortic stenosis (s/p TAVR), chronic hypoxic respiratory failure secondary to COPD (baseline on 3-4L NC at home), CKD stage IV, and BPH who presented to the ED on 1/10/25 for complaints of shortness of breath and admitted for acute on chronic combined HFpEF and HFrEF exacerbation and acute blood loss anemia secondary to suspected upper GI bleed.    Acute on chronic combined HFpEF and HFrEF exacerbation  Likely due to non compliance with medications particularly diuretics as per wife  States his left hand, abdomen, and leg have been very swollen  Sleeps on recliner due to inability to lay flat, denies paroxysmal nocturnal dyspnea  Pro-BNP 4773 on admission,   Prior TTE (on 8/8/24) with LV EF 25-30%, global LV hypokinesis, grade III diastolic dysfunction, moderate pHTN, mild ID, moderate TR   Aggressive diuresis with furosemide 80 ibv BID as per nephro  strict Is and Os, daily weights, low salt diet, 2L fluid restriction  Continue GDMT with ACEi and beta-blockade  Monitor renal function and electrolytes while on IV diuresis  Telemetry    Acute blood loss anemia secondary to suspected upper GI bleed  Differential includes PUD (given recent NSAID use)  Reports increased shortness of breath x 2 months, likely component of SOB due to anemia given severity  Admits to two black bowel movements, most recent colonoscopy > 10 years ago and had polyps removed at that time  Hgb 6.5 on admission, baseline ~14? but most recent hgb 10.4 (on 11/25/24)>>8.5 s/p 2PRBCs  S/p Two units pRBCs in ED  f/u stool occult  hgb has been stable, c/w cbc q12    Chronic medical conditions:  HTN: PTA metoprolol succinate 50 mg, ramipril 2.5 mg reordered as lisinopril 10 mg given ramipril not on formulary  HLD: PTA simvastatin 40 mg reordered as atorvastatin 20 mg given simvastatin not on formulary  IDDM2 with hyperglycemia: last known A1c 9.4 (on 11/25/24), blood glucose 256 on admission, POC qac and qhs, PTA Lantus 15 U qhs, low dose SSI qac started, blood glucose goal < 180  CAD (s/p CABG): PTA ASA 81 mg held due to suspected GI bleed  pAF (on rivaroxaban): PTA rivaroxaban 15 mg held due to suspected GI bleed  Chronic hypoxic respiratory failure secondary to COPD (baseline on 3-4L NC at home): PTA tiotropium, symbicort reordered as advair, duonebs PRN  CKD stage IV: BUN 61 and Cr 2.71 on admission, baseline Cr appears to fluctuate but Cr 2.92 (on 11/25/24), avoid nephrotoxins, renally dose meds, encourage PO hydration, pending official nephrology recs  BPH: PTA tamsulosin 0.4 mg

## 2025-01-13 NOTE — CONSULT NOTE ADULT - PROBLEM SELECTOR RECOMMENDATION 9
echo from August of 2024 with reduced EF 25-30%, Grade III diastolic dysfunction, moderate TR and moderate pHTN  h/o cardiac mems which he no longer uses (received a notice from [employer] about radiation with device?)  has AICD  on lasix at home patient handles his medications typically, on lasix 80mg IV 3 times per day  was on NIV prior now back on NC and reports breathing is more comfortable, abdomen distended, non-tender

## 2025-01-13 NOTE — CONSULT NOTE ADULT - CONVERSATION DETAILS
Spoke with patient at bedside who shared he lives at home with his wife.  He has 5 children and confirms HCP on file listing his wife Judi ([phone]) [phone] (c) as primary HCP and daughter, Arminda Solares [phone] as the alternate agent.  He uses a walker but said he has been more short of breath even having more difficulty getting to the bathroom.  He has had great difficulty getting out to appointments as he has trouble with the stairs and activity intolerance.  Asked about wishes about LST such as CPR and intubation, but he deferred to his wife fir those decisions.      Called and spoke with patient's wife, Judi initially via phone and then at bedside.  Introduced palliative and our role in the care team.  Discussed clinical issues and issues with getting to the doctor.  She said she is working with her insurance company to get a home visiting doctor.  Patient has been managing his meds and she thinks he has been taking lasix as prescribed.  Discussed chronic and progressive nature of heart failure and COPD as well as CKD.  Asked about wishes about LST such as CPR and intubation and she said she does not want to put him through those things.  PEDRO drafted indicating DNR/I with trial of NIV/use abx and IVF and no feeding tube.  Discussed HD should indications arise and she felt that would be acceptable, but encouraged to think about it further as it can be taxing on the body and given his other comorbidities and functional status may be overly burdensome.  Also discussed hospice and their philosophy of care as an option if patient is hospitalized more frequently or having more symptom burden.  Judi is familiar with hospice as her mother was on it.  For now wish to continue with current management and to treat the treatable.  PEDRO placed on chart.

## 2025-01-13 NOTE — CONSULT NOTE ADULT - SUBJECTIVE AND OBJECTIVE BOX
HPI:  Arun Solares is an 79 year old male with PMHx of HTN, HLD, IDDM2, CAD (s/p CABG), pAF (on rivaroxaban), chronic combined HFpEF and HFrEF (LV EF 25-30% and grade III diastolic dysfunction on echo 8/9/24, s/p AICD placement), hx of severe aortic stenosis (s/p TAVR), chronic hypoxic respiratory failure secondary to COPD (baseline on 3-4L NC at home), CKD stage IV, and BPH who presented to the ED on 1/10/25 for complaints of shortness of breath.    History primarily obtained form wife at bedside. As per wife at bedside, patient is chronically short of breath and uses up to 4L NC continuously. However, for the past two weeks, he has been more short of breath and had increased fluid retention. States his left hand, abdomen, and leg have been very swollen. Sleeps on recliner as he is unable to lay flat. No paroxysmal nocturnal dyspnea. Reports intermittent productive cough with white/grey phlegm as well. Denies taking OTC antitussives. No recent travel or known sick contacts. Has been compliant with supplemental oxygen and diuretics. However, states he was discharged from [facility] in August 2025 with prescription of spironolactone 25 mg. Took 1-month supply and then stopped taking it as no other physician gave him another prescription for it. In addition, patient states he has had two episodes of black stools. Took ibuprofen a few days ago for hand spasms. Last took aspirin and xarelto today. States last colonoscopy was > 10 years ago and he had a few polyps removed at that time. Baseline functional status is ambulates unassisted and sometimes uses a walker. Independent with all ADLs. Lives at home with wife.     In the ED, VSS. No documented hypoxia but placed on BiPAP 15/6 on 100%. WBC 13.16K, hgb 6.5, sodium 132, BUN 61, Cr 2.71, blood glucose 256. Troponin WNL. Pro-BNP 4773. ABG 7.51 / 44 / 415 / 35 on BiPAP 15/6 on 100%. CXR personally reviewed; cardiomegaly, vascular congestion, blunting of L. costophrenic angle to suggest pleural effusion, appears unchanged from CXR 8/8/24. Received furosemide 40 mg IV. Evaluated by nephrology who recommends furosemide 40 mg IV daily given takes 40 mg PO BID at home. Requested ER physician to decrease FiO2 given O2 on ABG > 400.  (10 Tay 2025 18:09)    PERTINENT PM/SXH:   HTN (hypertension)    DM (diabetes mellitus), type 2    High cholesterol    CHF (congestive heart failure)    Pneumonia    COPD (chronic obstructive pulmonary disease)    Pacemaker    AICD (automatic cardioverter/defibrillator) present    Chronic renal insufficiency    HLD (hyperlipidemia)    Insulin dependent type 2 diabetes mellitus    Chronic hypoxic respiratory failure, on home oxygen therapy    Stage 4 chronic kidney disease    Chronic combined systolic and diastolic heart failure    Unspecified atrial fibrillation      S/P CABG x 3    S/P cardiac pacemaker procedure    S/P hernia repair    S/P cardiac cath    S/P hernia repair    S/P implantation of automatic cardioverter/defibrillator (AICD)      FAMILY HISTORY:  FH: HTN (hypertension)      ITEMS NOT CHECKED ARE NOT PRESENT    SOCIAL HISTORY:   Significant other/partner: yes  [ ]  Children: yes (5) [ ]  Oriental orthodox/Spirituality: Denominational  Substance hx:  [ ]   Tobacco hx:  [ ]   Alcohol hx: [ ]   Home Opioid hx:  [ ] I-Stop Reference No:  Reference #: [id number]  Living Situation: [ x]Home  [ ]Long term care  [ ]Rehab [ ]Other    ADVANCE DIRECTIVES:    DNR  MOLST  [ ]  Living Will  [ ]   DECISION MAKER(s):  [ x] Health Care Proxy(s)  [ ] Surrogate(s)  [ ] Guardian           Name(s): Phone Number(s): Judi Solares (wife) [phone]    BASELINE (I)ADL(s) (prior to admission):  Wilson: [ ]Total  [ ] Moderate [ ]Dependent    Allergies    No Known Allergies    Intolerances    MEDICATIONS  (STANDING):  atorvastatin 20 milliGRAM(s) Oral at bedtime  dextrose 5%. 1000 milliLiter(s) (50 mL/Hr) IV Continuous <Continuous>  dextrose 5%. 1000 milliLiter(s) (100 mL/Hr) IV Continuous <Continuous>  dextrose 50% Injectable 25 Gram(s) IV Push once  dextrose 50% Injectable 12.5 Gram(s) IV Push once  dextrose 50% Injectable 25 Gram(s) IV Push once  fluticasone propionate/ salmeterol 250-50 MICROgram(s) Diskus 1 Dose(s) Inhalation two times a day  furosemide   Injectable 80 milliGRAM(s) IV Push every 8 hours  glucagon  Injectable 1 milliGRAM(s) IntraMuscular once  influenza  Vaccine (HIGH DOSE) 0.5 milliLiter(s) IntraMuscular once  insulin glargine Injectable (LANTUS) 15 Unit(s) SubCutaneous at bedtime  insulin lispro (ADMELOG) corrective regimen sliding scale   SubCutaneous three times a day before meals  iron sucrose Injectable 200 milliGRAM(s) IV Push <User Schedule>  metoprolol succinate ER 25 milliGRAM(s) Oral daily  pantoprazole  Injectable 40 milliGRAM(s) IV Push two times a day  tamsulosin 0.4 milliGRAM(s) Oral at bedtime  tiotropium 2.5 MICROgram(s) Inhaler 2 Puff(s) Inhalation daily    MEDICATIONS  (PRN):  acetaminophen     Tablet .. 650 milliGRAM(s) Oral every 6 hours PRN Temp greater or equal to 38C (100.4F), Mild Pain (1 - 3)  albuterol/ipratropium for Nebulization.. 3 milliLiter(s) Inhalation every 6 hours PRN -for shortness of breath and/or wheezing  aluminum hydroxide/magnesium hydroxide/simethicone Suspension 30 milliLiter(s) Oral every 4 hours PRN Dyspepsia  dextrose Oral Gel 15 Gram(s) Oral once PRN Blood Glucose LESS THAN 70 milliGRAM(s)/deciliter  melatonin 3 milliGRAM(s) Oral at bedtime PRN Insomnia  ondansetron Injectable 4 milliGRAM(s) IV Push every 8 hours PRN Nausea and/or Vomiting    PRESENT SYMPTOMS: [ ]Unable to obtain due to poor mentation   Source if other than patient:  [ ]Family   [ ]Team     Pain: [ ] yes [ ] no  QOL impact -   Location -                    Aggravating factors -  Quality -  Radiation -  Timing-  Severity (0-10 scale):  Minimal acceptable level (0-10 scale):     PAIN AD Score:     http://geriatrictoolkit.[employer].edu/cog/painad.pdf (press ctrl +  left click to view)    Dyspnea:                           [ ]Mild [ ]Moderate [ ]Severe  Anxiety:                             [ ]Mild [ ]Moderate [ ]Severe  Fatigue:                             [ ]Mild [ ]Moderate [ ]Severe  Nausea:                             [ ]Mild [ ]Moderate [ ]Severe  Loss of appetite:              [ ]Mild [ ]Moderate [ ]Severe  Constipation:                    [ ]Mild [ ]Moderate [ ]Severe    Other Symptoms:  [ ]All other review of systems negative     Karnofsky Performance Score/Palliative Performance Status Version 2:         %    http://npcrc.org/files/news/palliative_performance_scale_ppsv2.pdf  PHYSICAL EXAM:  Vital Signs Last 24 Hrs  T(C): 36.8 (13 Jan 2025 10:11), Max: 36.9 (12 Jan 2025 15:41)  T(F): 98.3 (13 Jan 2025 10:11), Max: 98.5 (12 Jan 2025 15:41)  HR: 72 (13 Jan 2025 10:11) (70 - 74)  BP: 120/60 (13 Jan 2025 10:11) (112/63 - 129/69)  BP(mean): --  RR: 18 (13 Jan 2025 10:11) (18 - 18)  SpO2: 95% (13 Jan 2025 10:11) (93% - 99%)    Parameters below as of 13 Jan 2025 06:19  Patient On (Oxygen Delivery Method): nasal cannula     I&O's Summary    12 Jan 2025 07:01  -  13 Jan 2025 07:00  --------------------------------------------------------  IN: 240 mL / OUT: 0 mL / NET: 240 mL    13 Jan 2025 07:01  -  13 Jan 2025 15:17  --------------------------------------------------------  IN: 320 mL / OUT: 700 mL / NET: -380 mL    GENERAL:  [ ]Alert  [ ]Oriented x   [ ]Lethargic  [ ]Cachexia  [ ]Unarousable  [ ]Verbal  [ ]Non-Verbal  Behavioral:   [ ] Anxiety  [ ] Delirium [ ] Agitation [ ] Other  HEENT:  [ ]Normal   [ ]Dry mouth   [ ]ET Tube/Trach  [ ]Oral lesions  PULMONARY:   [ ]Clear [ ]Tachypnea  [ ]Audible excessive secretions   [ ]Rhonchi        [ ]Right [ ]Left [ ]Bilateral  [ ]Crackles        [ ]Right [ ]Left [ ]Bilateral  [ ]Wheezing     [ ]Right [ ]Left [ ]Bilateral  CARDIOVASCULAR:    [ ]Regular [ ]Irregular [ ]Tachy  [ ]Oscar [ ]Murmur [ ]Other  GASTROINTESTINAL:  [ ]Soft  [ ]Distended   [ ]+BS  [ ]Non tender [ ]Tender  [ ]PEG [ ]OGT/ NGT  Last BM: GENITOURINARY:  [ ]Normal [ ] Incontinent   [ ]Oliguria/Anuria   [ ]Bucio  MUSCULOSKELETAL:   [ ]Normal   [ ]Weakness  [ ]Bed/Wheelchair bound [ ]Edema  NEUROLOGIC:   [ ]No focal deficits  [ ] Cognitive impairment  [ ] Dysphagia [ ]Dysarthria [ ] Paresis [ ]Other   SKIN:   [ ]Normal   [ ]Pressure ulcer(s)  [ ]Rash    CRITICAL CARE:  [ ] Shock Present  [ ]Septic [ ]Cardiogenic [ ]Neurologic [ ]Hypovolemic  [ ]  Vasopressors [ ]  Inotropes   [ ] Respiratory failure present [ ] mechanical ventilation [ ] non-invasive ventilatory support [ ] High flow  [ ] Acute  [ ] Chronic [ ] Hypoxic  [ ] Hypercarbic [ ] Other  [ ] Other organ failure     LABS:                        8.1    9.82  )-----------( 129      ( 13 Jan 2025 07:15 )             27.9   01-13    137  |  96  |  56[H]  ----------------------------<  284[H]  3.6   |  34[H]  |  2.51[H]    Ca    8.3[L]      13 Jan 2025 07:15    TPro  6.2  /  Alb  2.6[L]  /  TBili  1.1  /  DBili  x   /  AST  30  /  ALT  20  /  AlkPhos  236[H]  01-13      Urinalysis Basic - ( 13 Jan 2025 07:15 )    Color: x / Appearance: x / SG: x / pH: x  Gluc: 284 mg/dL / Ketone: x  / Bili: x / Urobili: x   Blood: x / Protein: x / Nitrite: x   Leuk Esterase: x / RBC: x / WBC x   Sq Epi: x / Non Sq Epi: x / Bacteria: x      RADIOLOGY & ADDITIONAL STUDIES:    PROTEIN CALORIE MALNUTRITION PRESENT: [ ] Yes [ ] No  [ ] PPSV2 < or = to 30% [ ] significant weight loss  [ ] poor nutritional intake [ ] catabolic state [ ] anasarca     Artificial Nutrition [ ]     REFERRALS:   [ ]Chaplaincy  [ ] Hospice  [ ]Child Life  [ ]Social Work  [ ]Case management [ ]Holistic Therapy     Goals of Care Document:   Care Coordination Assessment 201 [C. Provider] (01-13-25 @ 15:02)    HPI:  Arun Solares is an 79 year old male with PMHx of HTN, HLD, IDDM2, CAD (s/p CABG), pAF (on rivaroxaban), chronic combined HFpEF and HFrEF (LV EF 25-30% and grade III diastolic dysfunction on echo 8/9/24, s/p AICD placement), hx of severe aortic stenosis (s/p TAVR), chronic hypoxic respiratory failure secondary to COPD (baseline on 3-4L NC at home), CKD stage IV, and BPH who presented to the ED on 1/10/25 for complaints of shortness of breath.    History primarily obtained form wife at bedside. As per wife at bedside, patient is chronically short of breath and uses up to 4L NC continuously. However, for the past two weeks, he has been more short of breath and had increased fluid retention. States his left hand, abdomen, and leg have been very swollen. Sleeps on recliner as he is unable to lay flat. No paroxysmal nocturnal dyspnea. Reports intermittent productive cough with white/grey phlegm as well. Denies taking OTC antitussives. No recent travel or known sick contacts. Has been compliant with supplemental oxygen and diuretics. However, states he was discharged from [facility] in August 2025 with prescription of spironolactone 25 mg. Took 1-month supply and then stopped taking it as no other physician gave him another prescription for it. In addition, patient states he has had two episodes of black stools. Took ibuprofen a few days ago for hand spasms. Last took aspirin and xarelto today. States last colonoscopy was > 10 years ago and he had a few polyps removed at that time. Baseline functional status is ambulates unassisted and sometimes uses a walker. Independent with all ADLs. Lives at home with wife.     In the ED, VSS. No documented hypoxia but placed on BiPAP 15/6 on 100%. WBC 13.16K, hgb 6.5, sodium 132, BUN 61, Cr 2.71, blood glucose 256. Troponin WNL. Pro-BNP 4773. ABG 7.51 / 44 / 415 / 35 on BiPAP 15/6 on 100%. CXR personally reviewed; cardiomegaly, vascular congestion, blunting of L. costophrenic angle to suggest pleural effusion, appears unchanged from CXR 8/8/24. Received furosemide 40 mg IV. Evaluated by nephrology who recommends furosemide 40 mg IV daily given takes 40 mg PO BID at home. Requested ER physician to decrease FiO2 given O2 on ABG > 400.  (10 Tay 2025 18:09)    PERTINENT PM/SXH:   HTN (hypertension)    DM (diabetes mellitus), type 2    High cholesterol    CHF (congestive heart failure)    Pneumonia    COPD (chronic obstructive pulmonary disease)    Pacemaker    AICD (automatic cardioverter/defibrillator) present    Chronic renal insufficiency    HLD (hyperlipidemia)    Insulin dependent type 2 diabetes mellitus    Chronic hypoxic respiratory failure, on home oxygen therapy    Stage 4 chronic kidney disease    Chronic combined systolic and diastolic heart failure    Unspecified atrial fibrillation      S/P CABG x 3    S/P cardiac pacemaker procedure    S/P hernia repair    S/P cardiac cath    S/P hernia repair    S/P implantation of automatic cardioverter/defibrillator (AICD)      FAMILY HISTORY:  FH: HTN (hypertension)      ITEMS NOT CHECKED ARE NOT PRESENT    SOCIAL HISTORY:   Significant other/partner: yes  [ ]  Children: yes (5) [ ]  Yazdanism/Spirituality: Sikhism  Substance hx:  [ ]   Tobacco hx:  [ ]   Alcohol hx: [ ]   Home Opioid hx:  [ ] I-Stop Reference No:  Reference #: [id number]  Living Situation: [ x]Home  [ ]Long term care  [ ]Rehab [ ]Other    ADVANCE DIRECTIVES:    DNR  MOLST  [ ]  Living Will  [ ]   DECISION MAKER(s):  [ x] Health Care Proxy(s)  [ ] Surrogate(s)  [ ] Guardian           Name(s): Phone Number(s): Judi Solares (wife) [phone]    BASELINE (I)ADL(s) (prior to admission):  [place]gle: [ ]Total  [x ] Moderate [ ]Dependent    Allergies    No Known Allergies    Intolerances    MEDICATIONS  (STANDING):  atorvastatin 20 milliGRAM(s) Oral at bedtime  dextrose 5%. 1000 milliLiter(s) (50 mL/Hr) IV Continuous <Continuous>  dextrose 5%. 1000 milliLiter(s) (100 mL/Hr) IV Continuous <Continuous>  dextrose 50% Injectable 25 Gram(s) IV Push once  dextrose 50% Injectable 12.5 Gram(s) IV Push once  dextrose 50% Injectable 25 Gram(s) IV Push once  fluticasone propionate/ salmeterol 250-50 MICROgram(s) Diskus 1 Dose(s) Inhalation two times a day  furosemide   Injectable 80 milliGRAM(s) IV Push every 8 hours  glucagon  Injectable 1 milliGRAM(s) IntraMuscular once  influenza  Vaccine (HIGH DOSE) 0.5 milliLiter(s) IntraMuscular once  insulin glargine Injectable (LANTUS) 15 Unit(s) SubCutaneous at bedtime  insulin lispro (ADMELOG) corrective regimen sliding scale   SubCutaneous three times a day before meals  iron sucrose Injectable 200 milliGRAM(s) IV Push <User Schedule>  metoprolol succinate ER 25 milliGRAM(s) Oral daily  pantoprazole  Injectable 40 milliGRAM(s) IV Push two times a day  tamsulosin 0.4 milliGRAM(s) Oral at bedtime  tiotropium 2.5 MICROgram(s) Inhaler 2 Puff(s) Inhalation daily    MEDICATIONS  (PRN):  acetaminophen     Tablet .. 650 milliGRAM(s) Oral every 6 hours PRN Temp greater or equal to 38C (100.4F), Mild Pain (1 - 3)  albuterol/ipratropium for Nebulization.. 3 milliLiter(s) Inhalation every 6 hours PRN -for shortness of breath and/or wheezing  aluminum hydroxide/magnesium hydroxide/simethicone Suspension 30 milliLiter(s) Oral every 4 hours PRN Dyspepsia  dextrose Oral Gel 15 Gram(s) Oral once PRN Blood Glucose LESS THAN 70 milliGRAM(s)/deciliter  melatonin 3 milliGRAM(s) Oral at bedtime PRN Insomnia  ondansetron Injectable 4 milliGRAM(s) IV Push every 8 hours PRN Nausea and/or Vomiting    PRESENT SYMPTOMS: [ ]Unable to obtain due to poor mentation   Source if other than patient:  [ ]Family   [ ]Team     Pain: [ ] yes [ x] no  QOL impact -   Location -                    Aggravating factors -  Quality -  Radiation -  Timing-  Severity (0-10 scale):  Minimal acceptable level (0-10 scale):     PAIN AD Score:     http://geriatrictoolkit.[employer].edu/cog/painad.pdf (press ctrl +  left click to view)    Dyspnea:                           [ ]Mild [ ]Moderate [ ]Severe  Anxiety:                             [ ]Mild [ ]Moderate [ ]Severe  Fatigue:                             [ ]Mild [ ]Moderate [ ]Severe  Nausea:                             [ ]Mild [ ]Moderate [ ]Severe  Loss of appetite:              [ ]Mild [ ]Moderate [ ]Severe  Constipation:                    [ ]Mild [ ]Moderate [ ]Severe    Other Symptoms:  [x ]All other review of systems negative     Karnofsky Performance Score/Palliative Performance Status Version 2:        60 %    http://[email or web address]/files/news/palliative_performance_scale_[email or web address].pdf  PHYSICAL EXAM:  Vital Signs Last 24 Hrs  T(C): 36.8 (13 Jan 2025 10:11), Max: 36.9 (12 Jan 2025 15:41)  T(F): 98.3 (13 Jan 2025 10:11), Max: 98.5 (12 Jan 2025 15:41)  HR: 72 (13 Jan 2025 10:11) (70 - 74)  BP: 120/60 (13 Jan 2025 10:11) (112/63 - 129/69)  BP(mean): --  RR: 18 (13 Jan 2025 10:11) (18 - 18)  SpO2: 95% (13 Jan 2025 10:11) (93% - 99%)    Parameters below as of 13 Jan 2025 06:19  Patient On (Oxygen Delivery Method): nasal cannula     I&O's Summary    12 Jan 2025 07:01  -  13 Jan 2025 07:00  --------------------------------------------------------  IN: 240 mL / OUT: 0 mL / NET: 240 mL    13 Jan 2025 07:01  -  13 Jan 2025 15:17  --------------------------------------------------------  IN: 320 mL / OUT: 700 mL / NET: -380 mL    GENERAL:  [x]Alert  [ ]Oriented x   [ ]Lethargic  [ ]Cachexia  [ ]Unarousable  [ x]Verbal  [ ]Non-Verbal  Behavioral:   [ ] Anxiety  [ ] Delirium [ ] Agitation [ ] Other  HEENT:  [ ]Normal   [ ]Dry mouth   [ ]ET Tube/Trach  [ ]Oral lesions  PULMONARY:   [ ]Clear [ ]Tachypnea  [ ]Audible excessive secretions   [ ]Rhonchi        [ ]Right [ ]Left [ ]Bilateral  [ ]Crackles        [ ]Right [ ]Left [ ]Bilateral  [ ]Wheezing     [ ]Right [ ]Left [ ]Bilateral  diminished breath sounds bilaterally   CARDIOVASCULAR:    [x ]Regular [ ]Irregular [ ]Tachy  [ ]Oscar [ ]Murmur [ ]Other  GASTROINTESTINAL:  [ ]Soft  [x ]Distended   [ ]+BS  [x ]Non tender [ ]Tender  [ ]PEG [ ]OGT/ NGT  Last BM:  1/12/25 GENITOURINARY:  [ ]Normal [ ] Incontinent   [ ]Oliguria/Anuria   [ ]Bucio  MUSCULOSKELETAL:   [ ]Normal   [x ]Weakness  [ ]Bed/Wheelchair bound [x ]Edema left arm   NEUROLOGIC:   [x ]No focal deficits  [ ] Cognitive impairment  [ ] Dysphagia [ ]Dysarthria [ ] Paresis [ ]Other   SKIN:   [ ]Normal   [ x]Pressure ulcer(s) right buttocks stage II per flow sheet  [ ]Rash    CRITICAL CARE:  [ ] Shock Present  [ ]Septic [ ]Cardiogenic [ ]Neurologic [ ]Hypovolemic  [ ]  Vasopressors [ ]  Inotropes   [ ] Respiratory failure present [ ] mechanical ventilation [ ] non-invasive ventilatory support [ ] High flow  [ ] Acute  [ ] Chronic [ ] Hypoxic  [ ] Hypercarbic [ ] Other  [ ] Other organ failure     LABS:                        8.1    9.82  )-----------( 129      ( 13 Jan 2025 07:15 )             27.9   01-13    137  |  96  |  56[H]  ----------------------------<  284[H]  3.6   |  34[H]  |  2.51[H]    Ca    8.3[L]      13 Jan 2025 07:15    TPro  6.2  /  Alb  2.6[L]  /  TBili  1.1  /  DBili  x   /  AST  30  /  ALT  20  /  AlkPhos  236[H]  01-13    RADIOLOGY & ADDITIONAL STUDIES:  < from: US Duplex Venous Upper Ext Ltd, Left (01.11.25 @ 02:59) >  IMPRESSION:  Positive for thrombus within the left brachial and basilic vein.  --- End of Report ---  < end of copied text >    < from: Xray Chest 1 View-PORTABLE IMMEDIATE (Xray Chest 1 View-PORTABLE IMMEDIATE .) (01.10.25 @ 17:13) >  IMPRESSION: Stable cardiac findings. Fluid is a left base again seen.   Overall chest has improved from prior.  --- End of Report ---  < end of copied text >    < from: TTE Echo Complete w/ Contrast w/ Doppler (08.08.24 @ 13:27) >  CONCLUSIONS:   1. Left ventricular cavity is normal in size. Left ventricular systolic   function is severely decreased with an ejection fraction visually   estimated at 25 to 30 %. Global left ventricular hypokinesis.   2. There is severe (grade 3) left ventricular diastolic dysfunction,   with elevated left ventricular filling pressure.   3. The right ventricle is not well visualized.   4. Device lead is visualized in the right heart.   5. No significant valvular disease.   6. No pericardial effusion seen.   7. Estimated pulmonary artery systolic pressure is 53 mmHg, consistent   with moderate pulmonary hypertension.   8. Left and moderate left pleural effusion noted.   9. Mild pulmonic regurgitation.  10. Moderate tricuspid regurgitation.  < end of copied text >    PROTEIN CALORIE MALNUTRITION PRESENT: [ ] Yes [ ] No  [ ] PPSV2 < or = to 30% [ ] significant weight loss  [ ] poor nutritional intake [ ] catabolic state [ ] anasarca     Artificial Nutrition [ ]     REFERRALS:   [ ]Chaplaincy  [ ] Hospice  [ ]Child Life  [ ]Social Work  [ ]Case management [ ]Holistic Therapy     Goals of Care Document:   Care Coordination Assessment 201 [C. Provider] (01-13-25 @ 15:02)

## 2025-01-13 NOTE — PROGRESS NOTE ADULT - SUBJECTIVE AND OBJECTIVE BOX
[facility] NEPHROLOGY [facility], [facility]  NEPHROLOGY AND HYPERTENSION  [address]  [address]  [place] [address]  [phone]    MD DANIEL GARRETT MD YELENA ROSENBERG, MD BINNY KOSHY, MD CHRISTOPHER CAPUTO, MD EDWARD BOVER, MD          Patient events noted    MEDICATIONS  (STANDING):  atorvastatin 20 milliGRAM(s) Oral at bedtime  dextrose 5%. 1000 milliLiter(s) (50 mL/Hr) IV Continuous <Continuous>  dextrose 5%. 1000 milliLiter(s) (100 mL/Hr) IV Continuous <Continuous>  dextrose 50% Injectable 25 Gram(s) IV Push once  dextrose 50% Injectable 12.5 Gram(s) IV Push once  dextrose 50% Injectable 25 Gram(s) IV Push once  fluticasone propionate/ salmeterol 250-50 MICROgram(s) Diskus 1 Dose(s) Inhalation two times a day  furosemide   Injectable 80 milliGRAM(s) IV Push every 8 hours  glucagon  Injectable 1 milliGRAM(s) IntraMuscular once  influenza  Vaccine (HIGH DOSE) 0.5 milliLiter(s) IntraMuscular once  insulin glargine Injectable (LANTUS) 15 Unit(s) SubCutaneous at bedtime  insulin lispro (ADMELOG) corrective regimen sliding scale   SubCutaneous three times a day before meals  iron sucrose Injectable 200 milliGRAM(s) IV Push <User Schedule>  metoprolol succinate ER 25 milliGRAM(s) Oral daily  pantoprazole  Injectable 40 milliGRAM(s) IV Push two times a day  tamsulosin 0.4 milliGRAM(s) Oral at bedtime  tiotropium 2.5 MICROgram(s) Inhaler 2 Puff(s) Inhalation daily    MEDICATIONS  (PRN):  acetaminophen     Tablet .. 650 milliGRAM(s) Oral every 6 hours PRN Temp greater or equal to 38C (100.4F), Mild Pain (1 - 3)  albuterol/ipratropium for Nebulization.. 3 milliLiter(s) Inhalation every 6 hours PRN -for shortness of breath and/or wheezing  aluminum hydroxide/magnesium hydroxide/simethicone Suspension 30 milliLiter(s) Oral every 4 hours PRN Dyspepsia  dextrose Oral Gel 15 Gram(s) Oral once PRN Blood Glucose LESS THAN 70 milliGRAM(s)/deciliter  melatonin 3 milliGRAM(s) Oral at bedtime PRN Insomnia  ondansetron Injectable 4 milliGRAM(s) IV Push every 8 hours PRN Nausea and/or Vomiting      25 @ 07:01  -  25 @ 07:00  --------------------------------------------------------  IN: 240 mL / OUT: 0 mL / NET: 240 mL    25 @ 07:01  -  25 @ 19:08  --------------------------------------------------------  IN: 320 mL / OUT: 700 mL / NET: -380 mL      PHYSICAL EXAM:      T(C): 36.6 (25 @ 16:18), Max: 36.8 (25 @ 10:11)  HR: 77 (25 @ 17:31) (70 - 77)  BP: 99/51 (25 @ 16:18) (99/51 - 129/69)  RR: 20 (25 @ 16:18) (18 - 20)  SpO2: 97% (25 @ 17:31) (93% - 99%)  Wt(kg): --  Lungs clear ant decreased BS at bases   Heart S1S2  Abd soft NT ND  Extremities:   2-3 edema                                    8.1    9.82  )-----------( 129      ( 2025 07:15 )             27.9         137  |  96  |  56[H]  ----------------------------<  284[H]  3.6   |  34[H]  |  2.51[H]    Ca    8.3[L]      2025 07:15    TPro  6.2  /  Alb  2.6[L]  /  TBili  1.1  /  DBili  x   /  AST  30  /  ALT  20  /  AlkPhos  236[H]        LIVER FUNCTIONS - ( 2025 07:15 )  Alb: 2.6 g/dL / Pro: 6.2 gm/dL / ALK PHOS: 236 U/L / ALT: 20 U/L / AST: 30 U/L / GGT: x           Creatinine Trend: 2.51<--, 2.55<--, 2.22<--, 2.71<--      Daily Weight Trend:   Weight in k (25 @ 05:14)        Assessment  DENISHA CKD 3; fluid overload, hx pulm HTN  Hx COPD  Suspected mediation non adherence     Plan   Continue diuretic rx  COPD maintenance   Add Zaroxolyn 5 mg daily   Jon Rowe MD

## 2025-01-14 LAB
ANION GAP SERPL CALC-SCNC: 4 MMOL/L — LOW (ref 5–17)
BUN SERPL-MCNC: 56 MG/DL — HIGH (ref 7–23)
CALCIUM SERPL-MCNC: 8.7 MG/DL — SIGNIFICANT CHANGE UP (ref 8.5–10.1)
CHLORIDE SERPL-SCNC: 96 MMOL/L — SIGNIFICANT CHANGE UP (ref 96–108)
CO2 SERPL-SCNC: 36 MMOL/L — HIGH (ref 22–31)
CREAT SERPL-MCNC: 2.14 MG/DL — HIGH (ref 0.5–1.3)
EGFR: 30 ML/MIN/1.73M2 — LOW
GLUCOSE BLDC GLUCOMTR-MCNC: 253 MG/DL — HIGH (ref 70–99)
GLUCOSE BLDC GLUCOMTR-MCNC: 279 MG/DL — HIGH (ref 70–99)
GLUCOSE BLDC GLUCOMTR-MCNC: 365 MG/DL — HIGH (ref 70–99)
GLUCOSE BLDC GLUCOMTR-MCNC: 387 MG/DL — HIGH (ref 70–99)
GLUCOSE SERPL-MCNC: 248 MG/DL — HIGH (ref 70–99)
HCT VFR BLD CALC: 28.4 % — LOW (ref 39–50)
HGB BLD-MCNC: 8.4 G/DL — LOW (ref 13–17)
MCHC RBC-ENTMCNC: 24.9 PG — LOW (ref 27–34)
MCHC RBC-ENTMCNC: 29.6 G/DL — LOW (ref 32–36)
MCV RBC AUTO: 84 FL — SIGNIFICANT CHANGE UP (ref 80–100)
NRBC # BLD: 0 /100 WBCS — SIGNIFICANT CHANGE UP (ref 0–0)
NRBC BLD-RTO: 0 /100 WBCS — SIGNIFICANT CHANGE UP (ref 0–0)
PLATELET # BLD AUTO: 136 K/UL — LOW (ref 150–400)
POTASSIUM SERPL-MCNC: 2.9 MMOL/L — CRITICAL LOW (ref 3.5–5.3)
POTASSIUM SERPL-SCNC: 2.9 MMOL/L — CRITICAL LOW (ref 3.5–5.3)
RBC # BLD: 3.38 M/UL — LOW (ref 4.2–5.8)
RBC # FLD: 18 % — HIGH (ref 10.3–14.5)
SODIUM SERPL-SCNC: 136 MMOL/L — SIGNIFICANT CHANGE UP (ref 135–145)
WBC # BLD: 11.09 K/UL — HIGH (ref 3.8–10.5)
WBC # FLD AUTO: 11.09 K/UL — HIGH (ref 3.8–10.5)

## 2025-01-14 PROCEDURE — 99233 SBSQ HOSP IP/OBS HIGH 50: CPT

## 2025-01-14 RX ORDER — INSULIN GLARGINE-YFGN 100 [IU]/ML
10 INJECTION, SOLUTION SUBCUTANEOUS ONCE
Refills: 0 | Status: COMPLETED | OUTPATIENT
Start: 2025-01-14 | End: 2025-01-14

## 2025-01-14 RX ORDER — POTASSIUM CHLORIDE 750 MG/1
40 TABLET, EXTENDED RELEASE ORAL EVERY 4 HOURS
Refills: 0 | Status: COMPLETED | OUTPATIENT
Start: 2025-01-14 | End: 2025-01-14

## 2025-01-14 RX ORDER — POTASSIUM CHLORIDE 750 MG/1
10 TABLET, EXTENDED RELEASE ORAL
Refills: 0 | Status: COMPLETED | OUTPATIENT
Start: 2025-01-14 | End: 2025-01-14

## 2025-01-14 RX ADMIN — POTASSIUM CHLORIDE 100 MILLIEQUIVALENT(S): 750 TABLET, EXTENDED RELEASE ORAL at 09:29

## 2025-01-14 RX ADMIN — POTASSIUM CHLORIDE 100 MILLIEQUIVALENT(S): 750 TABLET, EXTENDED RELEASE ORAL at 10:50

## 2025-01-14 RX ADMIN — Medication 3: at 11:47

## 2025-01-14 RX ADMIN — Medication 80 MILLIGRAM(S): at 05:59

## 2025-01-14 RX ADMIN — POTASSIUM CHLORIDE 100 MILLIEQUIVALENT(S): 750 TABLET, EXTENDED RELEASE ORAL at 11:47

## 2025-01-14 RX ADMIN — Medication 80 MILLIGRAM(S): at 14:06

## 2025-01-14 RX ADMIN — Medication 5: at 17:03

## 2025-01-14 RX ADMIN — PANTOPRAZOLE 40 MILLIGRAM(S): 20 TABLET, DELAYED RELEASE ORAL at 18:00

## 2025-01-14 RX ADMIN — Medication 80 MILLIGRAM(S): at 21:16

## 2025-01-14 RX ADMIN — POTASSIUM CHLORIDE 40 MILLIEQUIVALENT(S): 750 TABLET, EXTENDED RELEASE ORAL at 18:01

## 2025-01-14 RX ADMIN — FLUTICASONE PROPIONATE AND SALMETEROL 1 DOSE(S): 113; 14 POWDER, METERED RESPIRATORY (INHALATION) at 17:16

## 2025-01-14 RX ADMIN — TAMSULOSIN HYDROCHLORIDE 0.4 MILLIGRAM(S): 0.4 CAPSULE ORAL at 21:16

## 2025-01-14 RX ADMIN — INSULIN GLARGINE-YFGN 15 UNIT(S): 100 INJECTION, SOLUTION SUBCUTANEOUS at 21:17

## 2025-01-14 RX ADMIN — TIOTROPIUM BROMIDE MONOHYDRATE 2 PUFF(S): 18 CAPSULE ORAL; RESPIRATORY (INHALATION) at 14:05

## 2025-01-14 RX ADMIN — ATORVASTATIN CALCIUM 20 MILLIGRAM(S): 80 TABLET, FILM COATED ORAL at 21:16

## 2025-01-14 RX ADMIN — Medication 3: at 08:28

## 2025-01-14 RX ADMIN — Medication 25 MILLIGRAM(S): at 05:59

## 2025-01-14 RX ADMIN — PANTOPRAZOLE 40 MILLIGRAM(S): 20 TABLET, DELAYED RELEASE ORAL at 06:10

## 2025-01-14 RX ADMIN — POTASSIUM CHLORIDE 40 MILLIEQUIVALENT(S): 750 TABLET, EXTENDED RELEASE ORAL at 09:29

## 2025-01-14 RX ADMIN — POTASSIUM CHLORIDE 40 MILLIEQUIVALENT(S): 750 TABLET, EXTENDED RELEASE ORAL at 14:05

## 2025-01-14 RX ADMIN — FLUTICASONE PROPIONATE AND SALMETEROL 1 DOSE(S): 113; 14 POWDER, METERED RESPIRATORY (INHALATION) at 05:59

## 2025-01-14 RX ADMIN — INSULIN GLARGINE-YFGN 10 UNIT(S): 100 INJECTION, SOLUTION SUBCUTANEOUS at 09:29

## 2025-01-14 NOTE — DIETITIAN INITIAL EVALUATION ADULT - NSICDXPASTSURGICALHX_GEN_ALL_CORE_FT
PAST SURGICAL HISTORY:  S/P CABG x 3 cabg3  in 2003    S/P hernia repair hernia [id number]    S/P implantation of automatic cardioverter/defibrillator (AICD)

## 2025-01-14 NOTE — DIETITIAN INITIAL EVALUATION ADULT - OTHER INFO
Spoke mostly to pt wife who was bedside at time of visit; pt joined conversation at times; appeared lethargic. Pt lives c wife; is independent c ADLs. Pt presented c SOB x 2 months PTA; sleeps in recliner as he is unable to lay flat in bed; refused to come to hospital for quite sometime; found c fluid overload due to CHF exacerbation; also c acute blood loss anemia 2/2 suspected UGIB.; s/p blood transfusions. Wife reports pt takes Humalog x 3/day (pt reports he takes after meals) + TOUJEO & was prescribed Ozempic but he stopped taking due to nausea. Denies any N/V/C/D or chew/swallowing difficulty.  Discussed importance of daily wts & low Na diet to monitor & control for fluid retention. Reviewed CHO food & beverages; importance of CHO portion control; suggested pt take fast-acting insulin prior to meal, not after for better control.

## 2025-01-14 NOTE — PROGRESS NOTE ADULT - ASSESSMENT
Arun Solares is an 79 year old male with PMHx of HTN, HLD, IDDM2, CAD (s/p CABG), pAF (on rivaroxaban), chronic combined HFpEF and HFrEF (LV EF 25-30% and grade III diastolic dysfunction on echo 8/9/24, s/p AICD placement), hx of severe aortic stenosis (s/p TAVR), chronic hypoxic respiratory failure secondary to COPD (baseline on 3-4L NC at home), CKD stage IV, and BPH who presented to the ED on 1/10/25 for complaints of shortness of breath and admitted for acute on chronic combined HFpEF and HFrEF exacerbation and acute blood loss anemia secondary to suspected upper GI bleed.    Acute on chronic combined HFpEF and HFrEF exacerbation  Likely due to non compliance with medications particularly diuretics as per wife  States his left hand, abdomen, and leg have been very swollen  Sleeps on recliner due to inability to lay flat, denies paroxysmal nocturnal dyspnea  Pro-BNP 4773 on admission,   Prior TTE (on 8/8/24) with LV EF 25-30%, global LV hypokinesis, grade III diastolic dysfunction, moderate pHTN, mild NC, moderate TR   Aggressive diuresis with furosemide 80 ibv BID as per nephro  strict Is and Os, daily weights, low salt diet, 2L fluid restriction  Continue GDMT with ACEi and beta-blockade  Monitor renal function and electrolytes while on IV diuresis  Telemetry  (1/14) C/W diuresis. Clinically improving.    Acute blood loss anemia secondary to suspected upper GI bleed  Differential includes PUD (given recent NSAID use)  Reports increased shortness of breath x 2 months, likely component of SOB due to anemia given severity  Admits to two black bowel movements, most recent colonoscopy > 10 years ago and had polyps removed at that time  Hgb 6.5 on admission, baseline ~14? but most recent hgb 10.4 (on 11/25/24)>>8.5 s/p 2PRBCs  S/p Two units pRBCs in ED  f/u stool occult  hgb has been stable, c/w cbc q12    Chronic medical conditions:  HTN: PTA metoprolol succinate 50 mg, ramipril 2.5 mg reordered as lisinopril 10 mg given ramipril not on formulary  HLD: PTA simvastatin 40 mg reordered as atorvastatin 20 mg given simvastatin not on formulary  IDDM2 with hyperglycemia: last known A1c 9.4 (on 11/25/24), blood glucose 256 on admission, POC qac and qhs, PTA Lantus 15 U qhs, low dose SSI qac started, blood glucose goal < 180  CAD (s/p CABG): PTA ASA 81 mg held due to suspected GI bleed  pAF (on rivaroxaban): PTA rivaroxaban 15 mg held due to suspected GI bleed  Chronic hypoxic respiratory failure secondary to COPD (baseline on 3-4L NC at home): PTA tiotropium, symbicort reordered as advair, duonebs PRN  CKD stage IV: BUN 61 and Cr 2.71 on admission, baseline Cr appears to fluctuate but Cr 2.92 (on 11/25/24), avoid nephrotoxins, renally dose meds, encourage PO hydration, pending official nephrology recs  BPH: PTA tamsulosin 0.4 mg

## 2025-01-14 NOTE — PROGRESS NOTE ADULT - SUBJECTIVE AND OBJECTIVE BOX
Patient is a 79y old  Male who presents with a chief complaint of Acute on chronic combined HFpEF and HFrEF exacerbation, acute blood loss anemia secondary to suspected upper GI bleed (2025 19:08)    INTERVAL HPI/OVERNIGHT EVENTS: Patients seen and examined at bedside this morning. No acute events overnight. Pt reports SOB improving.    MEDICATIONS  (STANDING):  atorvastatin 20 milliGRAM(s) Oral at bedtime  dextrose 5%. 1000 milliLiter(s) (50 mL/Hr) IV Continuous <Continuous>  dextrose 5%. 1000 milliLiter(s) (100 mL/Hr) IV Continuous <Continuous>  dextrose 50% Injectable 25 Gram(s) IV Push once  dextrose 50% Injectable 12.5 Gram(s) IV Push once  dextrose 50% Injectable 25 Gram(s) IV Push once  fluticasone propionate/ salmeterol 250-50 MICROgram(s) Diskus 1 Dose(s) Inhalation two times a day  furosemide   Injectable 80 milliGRAM(s) IV Push every 8 hours  glucagon  Injectable 1 milliGRAM(s) IntraMuscular once  influenza  Vaccine (HIGH DOSE) 0.5 milliLiter(s) IntraMuscular once  insulin glargine Injectable (LANTUS) 15 Unit(s) SubCutaneous at bedtime  insulin lispro (ADMELOG) corrective regimen sliding scale   SubCutaneous three times a day before meals  iron sucrose Injectable 200 milliGRAM(s) IV Push <User Schedule>  metolazone 5 milliGRAM(s) Oral daily  metoprolol succinate ER 25 milliGRAM(s) Oral daily  pantoprazole  Injectable 40 milliGRAM(s) IV Push two times a day  potassium chloride    Tablet ER 40 milliEquivalent(s) Oral every 4 hours  tamsulosin 0.4 milliGRAM(s) Oral at bedtime  tiotropium 2.5 MICROgram(s) Inhaler 2 Puff(s) Inhalation daily    MEDICATIONS  (PRN):  acetaminophen     Tablet .. 650 milliGRAM(s) Oral every 6 hours PRN Temp greater or equal to 38C (100.4F), Mild Pain (1 - 3)  albuterol/ipratropium for Nebulization.. 3 milliLiter(s) Inhalation every 6 hours PRN -for shortness of breath and/or wheezing  aluminum hydroxide/magnesium hydroxide/simethicone Suspension 30 milliLiter(s) Oral every 4 hours PRN Dyspepsia  dextrose Oral Gel 15 Gram(s) Oral once PRN Blood Glucose LESS THAN 70 milliGRAM(s)/deciliter  melatonin 3 milliGRAM(s) Oral at bedtime PRN Insomnia  ondansetron Injectable 4 milliGRAM(s) IV Push every 8 hours PRN Nausea and/or Vomiting    Allergies    No Known Allergies    Intolerances      REVIEW OF SYSTEMS:  All other systems reviewed and are negative    Vital Signs Last 24 Hrs  T(C): 36.7 (2025 10:14), Max: 36.9 (2025 00:09)  T(F): 98 (2025 10:14), Max: 98.4 (2025 00:09)  HR: 75 (2025 10:14) (70 - 94)  BP: 106/69 (2025 10:14) (99/51 - 119/71)  BP(mean): --  RR: 18 (2025 10:14) (18 - 20)  SpO2: 95% (2025 10:14) (95% - 97%)    Parameters below as of 2025 01:00  Patient On (Oxygen Delivery Method): BiPAP/CPAP      Daily     Daily Weight in k.2 (2025 04:59)  I&O's Summary    2025 07:01  -  2025 07:00  --------------------------------------------------------  IN: 420 mL / OUT: 2300 mL / NET: -1880 mL    2025 07:01  -  2025 14:51  --------------------------------------------------------  IN: 320 mL / OUT: 700 mL / NET: -380 mL      CAPILLARY BLOOD GLUCOSE      POCT Blood Glucose.: 279 mg/dL (2025 10:59)  POCT Blood Glucose.: 253 mg/dL (2025 07:45)  POCT Blood Glucose.: 334 mg/dL (2025 21:02)  POCT Blood Glucose.: 371 mg/dL (2025 16:37)    PHYSICAL EXAM:  GENERAL: NAD  HEAD:  Atraumatic, normocephalic  EYES: EOMI, PERRL  NECK: Supple, trachea midline, no JVD  HEART: Regular rate and rhythm  LUNGS: Unlabored respirations. Clear to auscultation bilaterally, no crackles, wheezing, or rhonchi  ABDOMEN: Soft, nontender, nondistended, +BS  EXTREMITIES:1+ RUE edema,  2+LE edema  NERVOUS SYSTEM:  A&Ox3, moving all extremities, no focal deficits     Labs                          8.4    11.09 )-----------( 136      ( 2025 08:15 )             28.4     -14    136  |  96  |  56[H]  ----------------------------<  248[H]  2.9[LL]   |  36[H]  |  2.14[H]    Ca    8.7      2025 08:15    TPro  6.2  /  Alb  2.6[L]  /  TBili  1.1  /  DBili  x   /  AST  30  /  ALT  20  /  AlkPhos  236[H]  01-13          Urinalysis Basic - ( 2025 08:15 )    Color: x / Appearance: x / SG: x / pH: x  Gluc: 248 mg/dL / Ketone: x  / Bili: x / Urobili: x   Blood: x / Protein: x / Nitrite: x   Leuk Esterase: x / RBC: x / WBC x   Sq Epi: x / Non Sq Epi: x / Bacteria: x                      Radiology and Imaging reviewed.

## 2025-01-14 NOTE — DIETITIAN INITIAL EVALUATION ADULT - PERTINENT MEDS FT
Lantus, Admelog sliding scale, Venofer, Zaroxolyn, Lasix, Toprol XL, Maalox, Lipitor, Melatonin, Protonix, Flomax

## 2025-01-14 NOTE — PROGRESS NOTE ADULT - SUBJECTIVE AND OBJECTIVE BOX
[facility] NEPHROLOGY SERVICES, [facility]  NEPHROLOGY AND HYPERTENSION  [address]  [address]  [place] [address]  [phone]    MD DANIEL GARRETT MD YELENA ROSENBERG, MD BINNY KOSHY, MD CHRISTOPHER CAPUTO, MD EDWARD BOVER, MD          Patient events noted    MEDICATIONS  (STANDING):  atorvastatin 20 milliGRAM(s) Oral at bedtime  dextrose 5%. 1000 milliLiter(s) (50 mL/Hr) IV Continuous <Continuous>  dextrose 5%. 1000 milliLiter(s) (100 mL/Hr) IV Continuous <Continuous>  dextrose 50% Injectable 25 Gram(s) IV Push once  dextrose 50% Injectable 12.5 Gram(s) IV Push once  dextrose 50% Injectable 25 Gram(s) IV Push once  fluticasone propionate/ salmeterol 250-50 MICROgram(s) Diskus 1 Dose(s) Inhalation two times a day  furosemide   Injectable 80 milliGRAM(s) IV Push every 8 hours  glucagon  Injectable 1 milliGRAM(s) IntraMuscular once  influenza  Vaccine (HIGH DOSE) 0.5 milliLiter(s) IntraMuscular once  insulin glargine Injectable (LANTUS) 15 Unit(s) SubCutaneous at bedtime  insulin lispro (ADMELOG) corrective regimen sliding scale   SubCutaneous three times a day before meals  iron sucrose Injectable 200 milliGRAM(s) IV Push <User Schedule>  metolazone 5 milliGRAM(s) Oral daily  metoprolol succinate ER 25 milliGRAM(s) Oral daily  pantoprazole  Injectable 40 milliGRAM(s) IV Push two times a day  tamsulosin 0.4 milliGRAM(s) Oral at bedtime  tiotropium 2.5 MICROgram(s) Inhaler 2 Puff(s) Inhalation daily    MEDICATIONS  (PRN):  acetaminophen     Tablet .. 650 milliGRAM(s) Oral every 6 hours PRN Temp greater or equal to 38C (100.4F), Mild Pain (1 - 3)  albuterol/ipratropium for Nebulization.. 3 milliLiter(s) Inhalation every 6 hours PRN -for shortness of breath and/or wheezing  aluminum hydroxide/magnesium hydroxide/simethicone Suspension 30 milliLiter(s) Oral every 4 hours PRN Dyspepsia  dextrose Oral Gel 15 Gram(s) Oral once PRN Blood Glucose LESS THAN 70 milliGRAM(s)/deciliter  melatonin 3 milliGRAM(s) Oral at bedtime PRN Insomnia  ondansetron Injectable 4 milliGRAM(s) IV Push every 8 hours PRN Nausea and/or Vomiting      01-13-25 @ 07:01  -  01-14-25 @ 07:00  --------------------------------------------------------  IN: 420 mL / OUT: 2300 mL / NET: -1880 mL    01-14-25 @ 07:01  -  01-14-25 @ 22:27  --------------------------------------------------------  IN: 320 mL / OUT: 700 mL / NET: -380 mL      PHYSICAL EXAM:      T(C): 36.8 (01-14-25 @ 16:07), Max: 36.9 (01-14-25 @ 00:09)  HR: 70 (01-14-25 @ 17:38) (70 - 93)  BP: 123/61 (01-14-25 @ 16:07) (100/57 - 123/61)  RR: 18 (01-14-25 @ 16:07) (18 - 20)  SpO2: 98% (01-14-25 @ 17:38) (95% - 98%)  Wt(kg): --  Lungs clear  Heart S1S2  Abd soft NT ND  Extremities:   tr edema                                    8.4    11.09 )-----------( 136      ( 14 Jan 2025 08:15 )             28.4     01-14    136  |  96  |  56[H]  ----------------------------<  248[H]  2.9[LL]   |  36[H]  |  2.14[H]    Ca    8.7      14 Jan 2025 08:15    TPro  6.2  /  Alb  2.6[L]  /  TBili  1.1  /  DBili  x   /  AST  30  /  ALT  20  /  AlkPhos  236[H]  01-13      LIVER FUNCTIONS - ( 13 Jan 2025 07:15 )  Alb: 2.6 g/dL / Pro: 6.2 gm/dL / ALK PHOS: 236 U/L / ALT: 20 U/L / AST: 30 U/L / GGT: x           Creatinine Trend: 2.14<--, 2.51<--, 2.55<--, 2.22<--, 2.71<--      Assessment  DENISHA CKD 3; fluid overload, hx pulm HTN  Hx COPD  Suspected mediation non adherence     Plan   Continue diuretic rx  Replete K, follow MG  COPD maintenance   Added Zaroxolyn 5 mg daily       Jon Rowe MD

## 2025-01-14 NOTE — DIETITIAN INITIAL EVALUATION ADULT - PERTINENT LABORATORY DATA
01-14    136  |  96  |  56[H]  ----------------------------<  248[H]  2.9[LL]   |  36[H]  |  2.14[H]    Ca    8.7      14 Jan 2025 08:15    TPro  6.2  /  Alb  2.6[L]  /  TBili  1.1  /  DBili  x   /  AST  30  /  ALT  20  /  AlkPhos  236[H]  01-13  POCT Blood Glucose.: 279 mg/dL (01-14-25); 01-13 308, 353, 371, 334  A1C Result: 9.0 % (08-08-24)

## 2025-01-15 LAB
ALBUMIN SERPL ELPH-MCNC: 2.7 G/DL — LOW (ref 3.3–5)
ALP SERPL-CCNC: 196 U/L — HIGH (ref 40–120)
ALT FLD-CCNC: 18 U/L — SIGNIFICANT CHANGE UP (ref 12–78)
ANION GAP SERPL CALC-SCNC: 4 MMOL/L — LOW (ref 5–17)
AST SERPL-CCNC: 24 U/L — SIGNIFICANT CHANGE UP (ref 15–37)
BILIRUB SERPL-MCNC: 1 MG/DL — SIGNIFICANT CHANGE UP (ref 0.2–1.2)
BUN SERPL-MCNC: 53 MG/DL — HIGH (ref 7–23)
CALCIUM SERPL-MCNC: 9 MG/DL — SIGNIFICANT CHANGE UP (ref 8.5–10.1)
CHLORIDE SERPL-SCNC: 97 MMOL/L — SIGNIFICANT CHANGE UP (ref 96–108)
CO2 SERPL-SCNC: 37 MMOL/L — HIGH (ref 22–31)
CREAT SERPL-MCNC: 1.93 MG/DL — HIGH (ref 0.5–1.3)
EGFR: 34 ML/MIN/1.73M2 — LOW
GLUCOSE BLDC GLUCOMTR-MCNC: 271 MG/DL — HIGH (ref 70–99)
GLUCOSE BLDC GLUCOMTR-MCNC: 273 MG/DL — HIGH (ref 70–99)
GLUCOSE BLDC GLUCOMTR-MCNC: 277 MG/DL — HIGH (ref 70–99)
GLUCOSE BLDC GLUCOMTR-MCNC: 306 MG/DL — HIGH (ref 70–99)
GLUCOSE BLDC GLUCOMTR-MCNC: 336 MG/DL — HIGH (ref 70–99)
GLUCOSE SERPL-MCNC: 255 MG/DL — HIGH (ref 70–99)
HCT VFR BLD CALC: 29.8 % — LOW (ref 39–50)
HGB BLD-MCNC: 8.5 G/DL — LOW (ref 13–17)
MAGNESIUM SERPL-MCNC: 1.9 MG/DL — SIGNIFICANT CHANGE UP (ref 1.6–2.6)
MCHC RBC-ENTMCNC: 24.6 PG — LOW (ref 27–34)
MCHC RBC-ENTMCNC: 28.5 G/DL — LOW (ref 32–36)
MCV RBC AUTO: 86.1 FL — SIGNIFICANT CHANGE UP (ref 80–100)
NRBC # BLD: 0 /100 WBCS — SIGNIFICANT CHANGE UP (ref 0–0)
NRBC BLD-RTO: 0 /100 WBCS — SIGNIFICANT CHANGE UP (ref 0–0)
PHOSPHATE SERPL-MCNC: 3.2 MG/DL — SIGNIFICANT CHANGE UP (ref 2.5–4.5)
PLATELET # BLD AUTO: 137 K/UL — LOW (ref 150–400)
POTASSIUM SERPL-MCNC: 3.4 MMOL/L — LOW (ref 3.5–5.3)
POTASSIUM SERPL-SCNC: 3.4 MMOL/L — LOW (ref 3.5–5.3)
PROT SERPL-MCNC: 6.4 GM/DL — SIGNIFICANT CHANGE UP (ref 6–8.3)
RBC # BLD: 3.36 M/UL — LOW (ref 4.2–5.8)
RBC # BLD: 3.46 M/UL — LOW (ref 4.2–5.8)
RBC # FLD: 18.3 % — HIGH (ref 10.3–14.5)
RETICS #: 141.5 K/UL — HIGH (ref 25–125)
RETICS/RBC NFR: 4.2 % — HIGH (ref 0.5–2.5)
SODIUM SERPL-SCNC: 138 MMOL/L — SIGNIFICANT CHANGE UP (ref 135–145)
WBC # BLD: 11.55 K/UL — HIGH (ref 3.8–10.5)
WBC # FLD AUTO: 11.55 K/UL — HIGH (ref 3.8–10.5)

## 2025-01-15 PROCEDURE — 99233 SBSQ HOSP IP/OBS HIGH 50: CPT

## 2025-01-15 PROCEDURE — 76937 US GUIDE VASCULAR ACCESS: CPT | Mod: 26

## 2025-01-15 PROCEDURE — 99223 1ST HOSP IP/OBS HIGH 75: CPT

## 2025-01-15 PROCEDURE — 36000 PLACE NEEDLE IN VEIN: CPT

## 2025-01-15 PROCEDURE — 36410 VNPNXR 3YR/> PHY/QHP DX/THER: CPT

## 2025-01-15 RX ORDER — INSULIN GLARGINE-YFGN 100 [IU]/ML
22 INJECTION, SOLUTION SUBCUTANEOUS AT BEDTIME
Refills: 0 | Status: DISCONTINUED | OUTPATIENT
Start: 2025-01-15 | End: 2025-01-18

## 2025-01-15 RX ORDER — ALBUMIN HUMAN 50 G/1000ML
50 SOLUTION INTRAVENOUS EVERY 8 HOURS
Refills: 0 | Status: COMPLETED | OUTPATIENT
Start: 2025-01-15 | End: 2025-01-16

## 2025-01-15 RX ORDER — POTASSIUM CHLORIDE 750 MG/1
40 TABLET, EXTENDED RELEASE ORAL EVERY 4 HOURS
Refills: 0 | Status: COMPLETED | OUTPATIENT
Start: 2025-01-15 | End: 2025-01-15

## 2025-01-15 RX ADMIN — IRON SUCROSE 200 MILLIGRAM(S): 20 INJECTION, SOLUTION INTRAVENOUS at 11:56

## 2025-01-15 RX ADMIN — Medication 80 MILLIGRAM(S): at 14:23

## 2025-01-15 RX ADMIN — TIOTROPIUM BROMIDE MONOHYDRATE 2 PUFF(S): 18 CAPSULE ORAL; RESPIRATORY (INHALATION) at 11:57

## 2025-01-15 RX ADMIN — Medication 4: at 11:56

## 2025-01-15 RX ADMIN — FLUTICASONE PROPIONATE AND SALMETEROL 1 DOSE(S): 113; 14 POWDER, METERED RESPIRATORY (INHALATION) at 06:17

## 2025-01-15 RX ADMIN — FLUTICASONE PROPIONATE AND SALMETEROL 1 DOSE(S): 113; 14 POWDER, METERED RESPIRATORY (INHALATION) at 17:25

## 2025-01-15 RX ADMIN — Medication 10 MG/HR: at 23:11

## 2025-01-15 RX ADMIN — TAMSULOSIN HYDROCHLORIDE 0.4 MILLIGRAM(S): 0.4 CAPSULE ORAL at 22:33

## 2025-01-15 RX ADMIN — Medication 3: at 07:55

## 2025-01-15 RX ADMIN — ACETAMINOPHEN 650 MILLIGRAM(S): 160 SUSPENSION ORAL at 07:50

## 2025-01-15 RX ADMIN — Medication 80 MILLIGRAM(S): at 05:18

## 2025-01-15 RX ADMIN — Medication 25 MILLIGRAM(S): at 05:16

## 2025-01-15 RX ADMIN — Medication 4: at 17:19

## 2025-01-15 RX ADMIN — PANTOPRAZOLE 40 MILLIGRAM(S): 20 TABLET, DELAYED RELEASE ORAL at 05:18

## 2025-01-15 RX ADMIN — INSULIN GLARGINE-YFGN 22 UNIT(S): 100 INJECTION, SOLUTION SUBCUTANEOUS at 23:11

## 2025-01-15 RX ADMIN — ATORVASTATIN CALCIUM 20 MILLIGRAM(S): 80 TABLET, FILM COATED ORAL at 22:34

## 2025-01-15 RX ADMIN — POTASSIUM CHLORIDE 40 MILLIEQUIVALENT(S): 750 TABLET, EXTENDED RELEASE ORAL at 14:23

## 2025-01-15 RX ADMIN — Medication 80 MILLIGRAM(S): at 22:33

## 2025-01-15 RX ADMIN — ALBUMIN HUMAN 50 MILLILITER(S): 50 SOLUTION INTRAVENOUS at 22:36

## 2025-01-15 RX ADMIN — ACETAMINOPHEN 650 MILLIGRAM(S): 160 SUSPENSION ORAL at 08:54

## 2025-01-15 RX ADMIN — PANTOPRAZOLE 40 MILLIGRAM(S): 20 TABLET, DELAYED RELEASE ORAL at 17:19

## 2025-01-15 RX ADMIN — POTASSIUM CHLORIDE 40 MILLIEQUIVALENT(S): 750 TABLET, EXTENDED RELEASE ORAL at 17:19

## 2025-01-15 NOTE — CONSULT NOTE ADULT - ASSESSMENT
79M with CAD/CABG, HFrEF 25%, ICD, afib, copd on home o2, htn hld dm p/w adhf.     ADHF  grossly overloaded  on toprol 25 (home dose 50), not on ramipril (home dose 2.5)unsure why not on entresto and/or aldactone, pt states he follows regularly with out cardio  +edema, orthopnea, SOB at rest. no cp palpitations dizziness syncope   on lasix 80 IV q8, if cont to put out adequate urine, would cont, if not, will need to consider toresemide, or lasix drip   cont to monitor lytes and replete as tolerated  may consider diuresing in ICU for improved monitoring, and/or HF consult if can not diurese properly  will follow with you   compression wrap b/l LE   monitor I and O accurately   cont tele  will follow with you 
79 year old male PMH of HFrEF, CKD, COPD (on NC at baseline), DM and HLD presented to the ED for shortness of breathing getting progressively worse.  Patient initially on NIV now back on NC.  Also labs remarkable for low hemoglobin and positive occult blood and s/p PRBCS.  Palliative consulted for GOC.

## 2025-01-15 NOTE — PROGRESS NOTE ADULT - ASSESSMENT
Arun Solares is an 79 year old male with PMHx of HTN, HLD, IDDM2, CAD (s/p CABG), pAF (on rivaroxaban), chronic combined HFpEF and HFrEF (LV EF 25-30% and grade III diastolic dysfunction on echo 8/9/24, s/p AICD placement), hx of severe aortic stenosis (s/p TAVR), chronic hypoxic respiratory failure secondary to COPD (baseline on 3-4L NC at home), CKD stage IV, and BPH who presented to the ED on 1/10/25 for complaints of shortness of breath and admitted for acute on chronic combined HFpEF and HFrEF exacerbation and acute blood loss anemia secondary to suspected upper GI bleed.    # Acute on chronic combined HFpEF and HFrEF exacerbation  Likely due to non compliance with medications particularly diuretics as per wife  States his left hand, abdomen, and leg have been very swollen  Sleeps on recliner due to inability to lay flat, denies paroxysmal nocturnal dyspnea  Pro-BNP 4773 on admission,   Prior TTE (on 8/8/24) with LV EF 25-30%, global LV hypokinesis, grade III diastolic dysfunction, moderate pHTN, mild NE, moderate TR   strict Is and Os, daily weights, low salt diet, 2L fluid restriction  Continue GDMT with ACEi and beta-blockade  Some response to aggressive diuresis with furosemide 80 IV per nephrology.  Cardiology input appreciated.  Can consider Lasix gtt if inadequate response to IV Lasix.  # LUE DVT - noted on LUE U/S.  Holding AC for now due to anemia with positive stool OB.  Awaiting GI iput.   # Acute blood loss anemia secondary to suspected upper GI bleed Differential includes PUD (given recent NSAID use) Reports increased shortness of breath x 2 months, likely component of SOB due to anemia given severity Admits to two black bowel movements, most recent colonoscopy > 10 years ago and had polyps removed at that time Hgb 6.5 on admission, baseline ~14? but most recent hgb 10.4 (on 11/25/24)>>8.5 s/p 2PRBCs in ED  Hgb has been stable.  Will d/w GI.    # HTN: PTA metoprolol succinate 50 mg, ramipril 2.5 mg reordered as lisinopril 10 mg given ramipril not on formulary  # HLD: PTA simvastatin 40 mg reordered as atorvastatin 20 mg given simvastatin not on formulary  # IDDM2 with hyperglycemia: last known A1c 9.4 (on 11/25/24), blood glucose 256 on admission, POC qac and qhs, PTA Lantus 15 U qhs, low dose SSI qac started, blood glucose goal < 180  # CAD (s/p CABG): PTA ASA 81 mg held due to suspected GI bleed  # pAF (on rivaroxaban): PTA rivaroxaban 15 mg held due to suspected GI bleed  # Chronic hypoxic respiratory failure secondary to COPD (baseline on 3-4L NC at home): PTA tiotropium, symbicort reordered as advair, duonebs PRN  # CKD stage IV: BUN 61 and Cr 2.71 on admission, baseline Cr appears to fluctuate but Cr 2.92 (on 11/25/24), avoid nephrotoxins, renally dose meds, encourage PO hydration, pending official nephrology recs  # BPH: PTA tamsulosin 0.4 mg         Arun Solares is an 79 year old male with PMHx of HTN, HLD, IDDM2, CAD (s/p CABG), pAF (on rivaroxaban), chronic combined HFpEF and HFrEF (LV EF 25-30% and grade III diastolic dysfunction on echo 8/9/24, s/p AICD placement), hx of severe aortic stenosis (s/p TAVR), chronic hypoxic respiratory failure secondary to COPD (baseline on 3-4L NC at home), CKD stage IV, and BPH who presented to the ED on 1/10/25 for complaints of shortness of breath and admitted for acute on chronic combined HFpEF and HFrEF exacerbation and acute blood loss anemia secondary to suspected upper GI bleed.    # Acute on chronic combined HFpEF and HFrEF exacerbation  Likely due to non compliance with medications particularly diuretics as per wife  States his left hand, abdomen, and leg have been very swollen  Sleeps on recliner due to inability to lay flat, denies paroxysmal nocturnal dyspnea  Pro-BNP 4773 on admission,   Prior TTE (on 8/8/24) with LV EF 25-30%, global LV hypokinesis, grade III diastolic dysfunction, moderate pHTN, mild SD, moderate TR   strict Is and Os, daily weights, low salt diet, 2L fluid restriction  Continue GDMT with ACEi and beta-blockade  Some response to aggressive diuresis with furosemide 80 IV per nephrology.  Cardiology input appreciated.  Can consider Lasix gtt if inadequate response to IV Lasix.  # LUE DVT - noted on LUE U/S.  Holding AC for now due to anemia with positive stool OB.  Awaiting GI iput.   # Acute blood loss anemia secondary to suspected upper GI bleed Differential includes PUD (given recent NSAID use) Reports increased shortness of breath x 2 months, likely component of SOB due to anemia given severity Admits to two black bowel movements, most recent colonoscopy > 10 years ago and had polyps removed at that time Hgb 6.5 on admission, baseline ~14? but most recent hgb 10.4 (on 11/25/24)>>8.5 s/p 2PRBCs in ED  Hgb has been stable.  Will d/w GI.    # HTN: PTA metoprolol succinate 50 mg, ramipril 2.5 mg reordered as lisinopril 10 mg given ramipril not on formulary  # HLD: PTA simvastatin 40 mg reordered as atorvastatin 20 mg given simvastatin not on formulary  # IDDM2 with hyperglycemia: last known A1c 9.4 (on 11/25/24), blood glucose 256 on admission, POC qac and qhs, PTA Lantus 15 U qhs, low dose SSI qac started, blood glucose goal < 180  # CAD (s/p CABG): PTA ASA 81 mg held due to suspected GI bleed  # pAF (on rivaroxaban): PTA rivaroxaban 15 mg held due to suspected GI bleed  # Chronic hypoxic respiratory failure secondary to COPD (baseline on 3-4L NC at home): PTA tiotropium, symbicort reordered as advair, duonebs PRN  # CKD stage IV: BUN 61 and Cr 2.71 on admission, baseline Cr appears to fluctuate but Cr 2.92 (on 11/25/24), avoid nephrotoxins, renally dose meds, encourage PO hydration, pending official nephrology recs  # BPH: PTA tamsulosin 0.4 mg  # Inpatient DVT Prophylaxis - Lower extremity intermittent compression devices.     Time spent by me managing the patient including but not limited to reviewing the chart, discussion with consultants, the IDR team (nurse//care manager/ACP), physical exam and assessment and plan is 52 mins

## 2025-01-15 NOTE — PROGRESS NOTE ADULT - SUBJECTIVE AND OBJECTIVE BOX
[facility] NEPHROLOGY SERVICES, [facility]  NEPHROLOGY AND HYPERTENSION  [address]  [address]  [place] [address]  [phone]    MD DANIEL GARRETT MD YELENA ROSENBERG, MD BINNY KOSHY, MD CHRISTOPHER CAPUTO, MD EDWARD BOVER, MD          Patient events noted    MEDICATIONS  (STANDING):  albumin human 25% IVPB 50 milliLiter(s) IV Intermittent every 8 hours  atorvastatin 20 milliGRAM(s) Oral at bedtime  dextrose 5%. 1000 milliLiter(s) (50 mL/Hr) IV Continuous <Continuous>  dextrose 5%. 1000 milliLiter(s) (100 mL/Hr) IV Continuous <Continuous>  dextrose 50% Injectable 25 Gram(s) IV Push once  dextrose 50% Injectable 12.5 Gram(s) IV Push once  dextrose 50% Injectable 25 Gram(s) IV Push once  fluticasone propionate/ salmeterol 250-50 MICROgram(s) Diskus 1 Dose(s) Inhalation two times a day  furosemide   Injectable 80 milliGRAM(s) IV Push once  furosemide Infusion 20 mG/Hr (10 mL/Hr) IV Continuous <Continuous>  glucagon  Injectable 1 milliGRAM(s) IntraMuscular once  influenza  Vaccine (HIGH DOSE) 0.5 milliLiter(s) IntraMuscular once  insulin glargine Injectable (LANTUS) 22 Unit(s) SubCutaneous at bedtime  insulin lispro (ADMELOG) corrective regimen sliding scale   SubCutaneous three times a day before meals  iron sucrose Injectable 200 milliGRAM(s) IV Push <User Schedule>  metolazone 5 milliGRAM(s) Oral daily  metoprolol succinate ER 25 milliGRAM(s) Oral daily  pantoprazole  Injectable 40 milliGRAM(s) IV Push two times a day  tamsulosin 0.4 milliGRAM(s) Oral at bedtime  tiotropium 2.5 MICROgram(s) Inhaler 2 Puff(s) Inhalation daily    MEDICATIONS  (PRN):  acetaminophen     Tablet .. 650 milliGRAM(s) Oral every 6 hours PRN Temp greater or equal to 38C (100.4F), Mild Pain (1 - 3)  albuterol/ipratropium for Nebulization.. 3 milliLiter(s) Inhalation every 6 hours PRN -for shortness of breath and/or wheezing  aluminum hydroxide/magnesium hydroxide/simethicone Suspension 30 milliLiter(s) Oral every 4 hours PRN Dyspepsia  dextrose Oral Gel 15 Gram(s) Oral once PRN Blood Glucose LESS THAN 70 milliGRAM(s)/deciliter  melatonin 3 milliGRAM(s) Oral at bedtime PRN Insomnia  ondansetron Injectable 4 milliGRAM(s) IV Push every 8 hours PRN Nausea and/or Vomiting      01-14-25 @ 07:01  -  01-15-25 @ 07:00  --------------------------------------------------------  IN: 320 mL / OUT: 1300 mL / NET: -980 mL      PHYSICAL EXAM:      T(C): 36.9 (01-15-25 @ 16:10), Max: 36.9 (01-15-25 @ 16:10)  HR: 73 (01-15-25 @ 17:46) (70 - 76)  BP: 124/70 (01-15-25 @ 16:10) (113/71 - 126/71)  RR: 18 (01-15-25 @ 16:10) (18 - 18)  SpO2: 96% (01-15-25 @ 17:46) (96% - 98%)  Wt(kg): --  Lungs clear  Heart S1S2  Abd soft NT ND  Extremities:   tr edema                                    8.5    11.55 )-----------( 137      ( 15 Tay 2025 06:55 )             29.8     01-15    138  |  97  |  53[H]  ----------------------------<  255[H]  3.4[L]   |  37[H]  |  1.93[H]    Ca    9.0      15 Tay 2025 06:55  Phos  3.2     01-15  Mg     1.9     01-15    TPro  6.4  /  Alb  2.7[L]  /  TBili  1.0  /  DBili  x   /  AST  24  /  ALT  18  /  AlkPhos  196[H]  01-15      LIVER FUNCTIONS - ( 15 Tay 2025 06:55 )  Alb: 2.7 g/dL / Pro: 6.4 gm/dL / ALK PHOS: 196 U/L / ALT: 18 U/L / AST: 24 U/L / GGT: x           Creatinine Trend: 1.93<--, 2.14<--, 2.51<--, 2.55<--, 2.22<--, 2.71<--      Assessment  DENISHA CKD 3; fluid overload, hx pulm HTN  Hx COPD  Suspected mediation non adherence     Plan   Convert IV lasix drip   I/O, weights    Jon Rowe MD

## 2025-01-15 NOTE — CHART NOTE - NSCHARTNOTEFT_GEN_A_CORE
Left IJ IV catheter removed due to non functioning. Pressure held x 5 minutes, DSD pressure dressing applied. Patient tolerated well. No bleeding noted.

## 2025-01-15 NOTE — CONSULT NOTE ADULT - SUBJECTIVE AND OBJECTIVE BOX
JO VARELA  [id number]    Date of Consult:  1/15/25  CHIEF COMPLAINT:  SOB  HISTORY OF PRESENT ILLNESS:  79M with CAD/CABG, HFrEF 25%, ICD, afib, copd on home o2, htn hld dm p/w adhf. pt states over past few days to chelsey trevizo has been gaining weight, +edema, orthopnea, SOB at rest. states he has never had this much volume build up in the past. states full med compliance, diet compliance. follows with dr zachariah alves, cardiology in [facility]. prev on entresto, now on ramirpil, pt unsure why. also on metoprolol and farxiga and lasix as outpt.   no cp palpitations dizziness syncope   initially required bipap, now on nc. currently laying down, no resp distress grossly overloaded, with edema in b/l LE, UE, abd.         Allergies    No Known Allergies    Intolerances    	    MEDICATIONS:  furosemide   Injectable 80 milliGRAM(s) IV Push every 8 hours  metolazone 5 milliGRAM(s) Oral daily  metoprolol succinate ER 25 milliGRAM(s) Oral daily      albuterol/ipratropium for Nebulization.. 3 milliLiter(s) Inhalation every 6 hours PRN  fluticasone propionate/ salmeterol 250-50 MICROgram(s) Diskus 1 Dose(s) Inhalation two times a day  tiotropium 2.5 MICROgram(s) Inhaler 2 Puff(s) Inhalation daily    acetaminophen     Tablet .. 650 milliGRAM(s) Oral every 6 hours PRN  melatonin 3 milliGRAM(s) Oral at bedtime PRN  ondansetron Injectable 4 milliGRAM(s) IV Push every 8 hours PRN    aluminum hydroxide/magnesium hydroxide/simethicone Suspension 30 milliLiter(s) Oral every 4 hours PRN  pantoprazole  Injectable 40 milliGRAM(s) IV Push two times a day    atorvastatin 20 milliGRAM(s) Oral at bedtime  dextrose 50% Injectable 25 Gram(s) IV Push once  dextrose 50% Injectable 12.5 Gram(s) IV Push once  dextrose 50% Injectable 25 Gram(s) IV Push once  dextrose Oral Gel 15 Gram(s) Oral once PRN  glucagon  Injectable 1 milliGRAM(s) IntraMuscular once  insulin glargine Injectable (LANTUS) 15 Unit(s) SubCutaneous at bedtime  insulin lispro (ADMELOG) corrective regimen sliding scale   SubCutaneous three times a day before meals    dextrose 5%. 1000 milliLiter(s) IV Continuous <Continuous>  dextrose 5%. 1000 milliLiter(s) IV Continuous <Continuous>  influenza  Vaccine (HIGH DOSE) 0.5 milliLiter(s) IntraMuscular once  iron sucrose Injectable 200 milliGRAM(s) IV Push <User Schedule>  potassium chloride    Tablet ER 40 milliEquivalent(s) Oral every 4 hours  tamsulosin 0.4 milliGRAM(s) Oral at bedtime      PAST MEDICAL & SURGICAL HISTORY:  HTN (hypertension)      COPD (chronic obstructive pulmonary disease)      HLD (hyperlipidemia)      Insulin dependent type 2 diabetes mellitus      Chronic hypoxic respiratory failure, on home oxygen therapy      Stage 4 chronic kidney disease      Chronic combined systolic and diastolic heart failure      Unspecified atrial fibrillation      S/P CABG x 3  cabg3  in 2003      S/P hernia repair  hernia [id number]      S/P implantation of automatic cardioverter/defibrillator (AICD)          FAMILY HISTORY:  FH: HTN (hypertension)        SOCIAL HISTORY:    denies tob etoh      REVIEW OF SYSTEMS:  See HPI. Otherwise, 10 point ROS done and otherwise negative.    PHYSICAL EXAM:  T(C): 36.2 (01-15-25 @ 11:21), Max: 36.8 (01-14-25 @ 16:07)  HR: 76 (01-15-25 @ 11:21) (70 - 76)  BP: 113/71 (01-15-25 @ 11:21) (113/71 - 126/71)  RR: 18 (01-15-25 @ 11:21) (18 - 18)  SpO2: 97% (01-15-25 @ 11:21) (96% - 98%)  Wt(kg): --  I&O's Summary    14 Jan 2025 07:01  -  15 Tay 2025 07:00  --------------------------------------------------------  IN: 320 mL / OUT: 1300 mL / NET: -980 mL        Appearance: Normal	  HEENT:   Normal oral mucosa, PERRL, EOMI	  Lymphatic: No lymphadenopathy  Cardiovascular: Normal S1 S2, No JVD, No murmurs, anasarca noted with abd edema as well   Respiratory: decreased bs b/l 	  Psychiatry: A & O x 3, Mood & affect appropriate  Gastrointestinal:  Soft, Non-tender, + BS	  Skin: No rashes, No ecchymoses, No cyanosis	  Neurologic: Non-focal  Extremities: Normal range of motion, No clubbing, cyanosis      LABS:	 	    CBC Full  -  ( 15 Tay 2025 06:55 )  WBC Count : 11.55 K/uL  Hemoglobin : 8.5 g/dL  Hematocrit : 29.8 %  Platelet Count - Automated : 137 K/uL  Mean Cell Volume : 86.1 fl  Mean Cell Hemoglobin : 24.6 pg  Mean Cell Hemoglobin Concentration : 28.5 g/dL  Auto Neutrophil # : x  Auto Lymphocyte # : x  Auto Monocyte # : x  Auto Eosinophil # : x  Auto Basophil # : x  Auto Neutrophil % : x  Auto Lymphocyte % : x  Auto Monocyte % : x  Auto Eosinophil % : x  Auto Basophil % : x    01-15    138  |  97  |  53[H]  ----------------------------<  255[H]  3.4[L]   |  37[H]  |  1.93[H]  01-14    136  |  96  |  56[H]  ----------------------------<  248[H]  2.9[LL]   |  36[H]  |  2.14[H]    Ca    9.0      15 Tay 2025 06:55  Ca    8.7      14 Jan 2025 08:15  Phos  3.2     01-15  Mg     1.9     01-15    TPro  6.4  /  Alb  2.7[L]  /  TBili  1.0  /  DBili  x   /  AST  24  /  ALT  18  /  AlkPhos  196[H]  01-15      proBNP:   Lipid Profile:   HgA1c:   TSH:       CARDIAC MARKERS:            TELEMETRY: 	    ECG:  	  RADIOLOGY:  OTHER: 	    PREVIOUS DIAGNOSTIC TESTING:    [ ] Echocardiogram:< from: TTE Echo Complete w/ Contrast w/ Doppler (08.08.24 @ 13:27) >   1. Left ventricular cavity is normal in size. Left ventricular systolic   function is severely decreased with an ejection fraction visually   estimated at 25 to 30 %. Global left ventricular hypokinesis.   2. There is severe (grade 3) left ventricular diastolic dysfunction,   with elevated left ventricular filling pressure.   3. The right ventricle is not well visualized.   4. Device lead is visualized in the right heart.   5. No significant valvular disease.   6. No pericardial effusion seen.   7. Estimated pulmonary artery systolic pressure is 53 mmHg, consistent   with moderate pulmonary hypertension.   8. Left and moderate left pleural effusion noted.   9. Mild pulmonic regurgitation.  10. Moderate tricuspid regurgitation.      < end of copied text >    [ ]  Catheterization:  [ ] Stress Test:  	  	  ASSESSMENT/PLAN: 	    Barry Camarillo MD

## 2025-01-15 NOTE — PROCEDURE NOTE - ADDITIONAL PROCEDURE DETAILS
ill pt requiring PIV access for medical management. Under US guidance identified*Right UE vein brachial, and successfully placed 20g x 10cm powerglide into vessel.  Placement confirmed s/p with ultrasound and catheter determined to be in patent lumen of vein. Pt tolerated well w/o complication.

## 2025-01-15 NOTE — CHART NOTE - NSCHARTNOTEFT_GEN_A_CORE
Palliative to sign off at this time as GOC are clear.  Please reconsult if GOC change or symptoms arise.  Thank you.

## 2025-01-15 NOTE — PROGRESS NOTE ADULT - SUBJECTIVE AND OBJECTIVE BOX
Patient: JO VARELA [id number] 79y Male                            Hospitalist Attending Note    Reports improvement in SOB since admission.    No chest pain / palpitations.  No additional complaints.     ____________________PHYSICAL EXAM:  GENERAL:  NAD Alert and Oriented x 3   HEENT: NCAT  CARDIOVASCULAR:  S1, S2  LUNGS: CTAB  ABDOMEN:  soft, (-) tenderness, (-) distension, (+) bowel sounds, (-) guarding, (-) rebound (-) rigidity  EXTREMITIES:  no cyanosis / clubbing.  + edema.   ____________________     VITALS:  Vital Signs Last 24 Hrs  T(C): 36.9 (15 Tay 2025 16:10), Max: 36.9 (15 Tay 2025 16:10)  T(F): 98.4 (15 Tay 2025 16:10), Max: 98.4 (15 Tay 2025 16:10)  HR: 73 (15 Tay 2025 17:46) (70 - 76)  BP: 124/70 (15 Tay 2025 16:10) (113/71 - 126/71)  BP(mean): --  RR: 18 (15 Tay 2025 16:10) (18 - 18)  SpO2: 96% (15 Tay 2025 17:46) (96% - 98%)    Parameters below as of 15 Tay 2025 16:10  Patient On (Oxygen Delivery Method): nasal cannula  O2 Flow (L/min): 2   Daily     Daily Weight in k.6 (15 Tay 2025 04:58)  CAPILLARY BLOOD GLUCOSE      POCT Blood Glucose.: 306 mg/dL (15 Tay 2025 16:36)  POCT Blood Glucose.: 336 mg/dL (15 Tay 2025 11:41)  POCT Blood Glucose.: 273 mg/dL (15 Tay 2025 07:54)  POCT Blood Glucose.: 387 mg/dL (2025 21:11)    I&O's Summary    2025 07:01  -  15 Tay 2025 07:00  --------------------------------------------------------  IN: 320 mL / OUT: 1300 mL / NET: -980 mL        HISTORY:  PAST MEDICAL & SURGICAL HISTORY:  HTN (hypertension)      COPD (chronic obstructive pulmonary disease)      HLD (hyperlipidemia)      Insulin dependent type 2 diabetes mellitus      Chronic hypoxic respiratory failure, on home oxygen therapy      Stage 4 chronic kidney disease      Chronic combined systolic and diastolic heart failure      Unspecified atrial fibrillation      S/P CABG x 3  cabg3  in       S/P hernia repair  hernia [id number]      S/P implantation of automatic cardioverter/defibrillator (AICD)      Allergies    No Known Allergies    Intolerances       LABS:                        8.5    11.55 )-----------( 137      ( 15 Tay 2025 06:55 )             29.8     01-15    138  |  97  |  53[H]  ----------------------------<  255[H]  3.4[L]   |  37[H]  |  1.93[H]    Ca    9.0      15 Tay 2025 06:55  Phos  3.2     01-15  Mg     1.9     01-15    TPro  6.4  /  Alb  2.7[L]  /  TBili  1.0  /  DBili  x   /  AST  24  /  ALT  18  /  AlkPhos  196[H]  01-15      LIVER FUNCTIONS - ( 15 Tay 2025 06:55 )  Alb: 2.7 g/dL / Pro: 6.4 gm/dL / ALK PHOS: 196 U/L / ALT: 18 U/L / AST: 24 U/L / GGT: x           Urinalysis Basic - ( 15 Tay 2025 06:55 )    Color: x / Appearance: x / SG: x / pH: x  Gluc: 255 mg/dL / Ketone: x  / Bili: x / Urobili: x   Blood: x / Protein: x / Nitrite: x   Leuk Esterase: x / RBC: x / WBC x   Sq Epi: x / Non Sq Epi: x / Bacteria: x              MEDICATIONS:  MEDICATIONS  (STANDING):  atorvastatin 20 milliGRAM(s) Oral at bedtime  dextrose 5%. 1000 milliLiter(s) (50 mL/Hr) IV Continuous <Continuous>  dextrose 5%. 1000 milliLiter(s) (100 mL/Hr) IV Continuous <Continuous>  dextrose 50% Injectable 25 Gram(s) IV Push once  dextrose 50% Injectable 12.5 Gram(s) IV Push once  dextrose 50% Injectable 25 Gram(s) IV Push once  fluticasone propionate/ salmeterol 250-50 MICROgram(s) Diskus 1 Dose(s) Inhalation two times a day  furosemide   Injectable 80 milliGRAM(s) IV Push every 8 hours  glucagon  Injectable 1 milliGRAM(s) IntraMuscular once  influenza  Vaccine (HIGH DOSE) 0.5 milliLiter(s) IntraMuscular once  insulin glargine Injectable (LANTUS) 15 Unit(s) SubCutaneous at bedtime  insulin lispro (ADMELOG) corrective regimen sliding scale   SubCutaneous three times a day before meals  iron sucrose Injectable 200 milliGRAM(s) IV Push <User Schedule>  metolazone 5 milliGRAM(s) Oral daily  metoprolol succinate ER 25 milliGRAM(s) Oral daily  pantoprazole  Injectable 40 milliGRAM(s) IV Push two times a day  tamsulosin 0.4 milliGRAM(s) Oral at bedtime  tiotropium 2.5 MICROgram(s) Inhaler 2 Puff(s) Inhalation daily    MEDICATIONS  (PRN):  acetaminophen     Tablet .. 650 milliGRAM(s) Oral every 6 hours PRN Temp greater or equal to 38C (100.4F), Mild Pain (1 - 3)  albuterol/ipratropium for Nebulization.. 3 milliLiter(s) Inhalation every 6 hours PRN -for shortness of breath and/or wheezing  aluminum hydroxide/magnesium hydroxide/simethicone Suspension 30 milliLiter(s) Oral every 4 hours PRN Dyspepsia  dextrose Oral Gel 15 Gram(s) Oral once PRN Blood Glucose LESS THAN 70 milliGRAM(s)/deciliter  melatonin 3 milliGRAM(s) Oral at bedtime PRN Insomnia  ondansetron Injectable 4 milliGRAM(s) IV Push every 8 hours PRN Nausea and/or Vomiting

## 2025-01-16 LAB
ANION GAP SERPL CALC-SCNC: 3 MMOL/L — LOW (ref 5–17)
BUN SERPL-MCNC: 51 MG/DL — HIGH (ref 7–23)
CALCIUM SERPL-MCNC: 9.3 MG/DL — SIGNIFICANT CHANGE UP (ref 8.5–10.1)
CHLORIDE SERPL-SCNC: 95 MMOL/L — LOW (ref 96–108)
CO2 SERPL-SCNC: 38 MMOL/L — HIGH (ref 22–31)
CREAT SERPL-MCNC: 2.1 MG/DL — HIGH (ref 0.5–1.3)
EGFR: 31 ML/MIN/1.73M2 — LOW
FERRITIN SERPL-MCNC: 178 NG/ML — SIGNIFICANT CHANGE UP (ref 30–400)
FOLATE SERPL-MCNC: >20 NG/ML — SIGNIFICANT CHANGE UP
GLUCOSE BLDC GLUCOMTR-MCNC: 238 MG/DL — HIGH (ref 70–99)
GLUCOSE BLDC GLUCOMTR-MCNC: 264 MG/DL — HIGH (ref 70–99)
GLUCOSE BLDC GLUCOMTR-MCNC: 271 MG/DL — HIGH (ref 70–99)
GLUCOSE BLDC GLUCOMTR-MCNC: 295 MG/DL — HIGH (ref 70–99)
GLUCOSE SERPL-MCNC: 222 MG/DL — HIGH (ref 70–99)
HCT VFR BLD CALC: 28.6 % — LOW (ref 39–50)
HGB BLD-MCNC: 8.2 G/DL — LOW (ref 13–17)
IRON SATN MFR SERPL: 399 UG/DL — HIGH (ref 45–165)
IRON SATN MFR SERPL: SIGNIFICANT CHANGE UP % (ref 16–55)
MAGNESIUM SERPL-MCNC: 2.1 MG/DL — SIGNIFICANT CHANGE UP (ref 1.6–2.6)
MCHC RBC-ENTMCNC: 24.9 PG — LOW (ref 27–34)
MCHC RBC-ENTMCNC: 28.7 G/DL — LOW (ref 32–36)
MCV RBC AUTO: 86.9 FL — SIGNIFICANT CHANGE UP (ref 80–100)
NRBC # BLD: 2 /100 WBCS — HIGH (ref 0–0)
NRBC BLD-RTO: 2 /100 WBCS — HIGH (ref 0–0)
PHOSPHATE SERPL-MCNC: 3.4 MG/DL — SIGNIFICANT CHANGE UP (ref 2.5–4.5)
PLATELET # BLD AUTO: 123 K/UL — LOW (ref 150–400)
POTASSIUM SERPL-MCNC: 3.2 MMOL/L — LOW (ref 3.5–5.3)
POTASSIUM SERPL-SCNC: 3.2 MMOL/L — LOW (ref 3.5–5.3)
RBC # BLD: 3.29 M/UL — LOW (ref 4.2–5.8)
RBC # FLD: 18.9 % — HIGH (ref 10.3–14.5)
SODIUM SERPL-SCNC: 136 MMOL/L — SIGNIFICANT CHANGE UP (ref 135–145)
TIBC SERPL-MCNC: SIGNIFICANT CHANGE UP UG/DL (ref 220–430)
UIBC SERPL-MCNC: <20 UG/DL — LOW (ref 110–370)
VIT B12 SERPL-MCNC: 1172 PG/ML — SIGNIFICANT CHANGE UP (ref 232–1245)
WBC # BLD: 11.34 K/UL — HIGH (ref 3.8–10.5)
WBC # FLD AUTO: 11.34 K/UL — HIGH (ref 3.8–10.5)

## 2025-01-16 PROCEDURE — 99233 SBSQ HOSP IP/OBS HIGH 50: CPT

## 2025-01-16 PROCEDURE — 99232 SBSQ HOSP IP/OBS MODERATE 35: CPT

## 2025-01-16 RX ORDER — POTASSIUM CHLORIDE 750 MG/1
10 TABLET, EXTENDED RELEASE ORAL
Refills: 0 | Status: COMPLETED | OUTPATIENT
Start: 2025-01-16 | End: 2025-01-16

## 2025-01-16 RX ORDER — POTASSIUM CHLORIDE 750 MG/1
20 TABLET, EXTENDED RELEASE ORAL ONCE
Refills: 0 | Status: COMPLETED | OUTPATIENT
Start: 2025-01-16 | End: 2025-01-16

## 2025-01-16 RX ADMIN — POTASSIUM CHLORIDE 100 MILLIEQUIVALENT(S): 750 TABLET, EXTENDED RELEASE ORAL at 09:59

## 2025-01-16 RX ADMIN — INSULIN GLARGINE-YFGN 22 UNIT(S): 100 INJECTION, SOLUTION SUBCUTANEOUS at 21:13

## 2025-01-16 RX ADMIN — Medication 3: at 17:46

## 2025-01-16 RX ADMIN — Medication 25 MILLIGRAM(S): at 06:50

## 2025-01-16 RX ADMIN — POTASSIUM CHLORIDE 100 MILLIEQUIVALENT(S): 750 TABLET, EXTENDED RELEASE ORAL at 15:29

## 2025-01-16 RX ADMIN — TIOTROPIUM BROMIDE MONOHYDRATE 2 PUFF(S): 18 CAPSULE ORAL; RESPIRATORY (INHALATION) at 11:31

## 2025-01-16 RX ADMIN — TAMSULOSIN HYDROCHLORIDE 0.4 MILLIGRAM(S): 0.4 CAPSULE ORAL at 21:13

## 2025-01-16 RX ADMIN — FLUTICASONE PROPIONATE AND SALMETEROL 1 DOSE(S): 113; 14 POWDER, METERED RESPIRATORY (INHALATION) at 17:47

## 2025-01-16 RX ADMIN — Medication 3: at 08:17

## 2025-01-16 RX ADMIN — Medication 2: at 11:30

## 2025-01-16 RX ADMIN — PANTOPRAZOLE 40 MILLIGRAM(S): 20 TABLET, DELAYED RELEASE ORAL at 17:46

## 2025-01-16 RX ADMIN — ALBUMIN HUMAN 50 MILLILITER(S): 50 SOLUTION INTRAVENOUS at 17:44

## 2025-01-16 RX ADMIN — PANTOPRAZOLE 40 MILLIGRAM(S): 20 TABLET, DELAYED RELEASE ORAL at 06:49

## 2025-01-16 RX ADMIN — ACETAMINOPHEN 650 MILLIGRAM(S): 160 SUSPENSION ORAL at 21:12

## 2025-01-16 RX ADMIN — ATORVASTATIN CALCIUM 20 MILLIGRAM(S): 80 TABLET, FILM COATED ORAL at 21:13

## 2025-01-16 RX ADMIN — ALBUMIN HUMAN 50 MILLILITER(S): 50 SOLUTION INTRAVENOUS at 06:38

## 2025-01-16 RX ADMIN — FLUTICASONE PROPIONATE AND SALMETEROL 1 DOSE(S): 113; 14 POWDER, METERED RESPIRATORY (INHALATION) at 06:50

## 2025-01-16 RX ADMIN — POTASSIUM CHLORIDE 100 MILLIEQUIVALENT(S): 750 TABLET, EXTENDED RELEASE ORAL at 11:30

## 2025-01-16 RX ADMIN — POTASSIUM CHLORIDE 20 MILLIEQUIVALENT(S): 750 TABLET, EXTENDED RELEASE ORAL at 11:30

## 2025-01-16 NOTE — PROGRESS NOTE ADULT - ASSESSMENT
79M with CAD/CABG, HFrEF 25%, ICD, afib, copd on home o2, htn hld dm p/w adhf.     ADHF  grossly overloaded  on toprol 25 (home dose 50), not on ramipril (home dose 2.5)unsure why not on entresto and/or aldactone, pt states he follows regularly with his outpatient cardio Dr. Stubbs   +edema, orthopnea, SOB at rest. no cp palpitations dizziness syncope   currently on lasix drip @ 20mg/hr  cont to monitor lytes and replete as tolerated  monitor renal function, daily weight, strict I & O  will follow with you   compression wrap b/l LE   +Lt UE DVT, AC on hold 2/2 anemia  cont tele  will follow with you  79M with CAD/CABG, HFrEF 25%, ICD, afib, copd on home o2, htn hld dm p/w adhf.     ADHF  remains grossly overloaded  on toprol 25 (home dose 50), not on ramipril (home dose 2.5)unsure why not on entresto and/or aldactone, pt states he follows regularly with his outpatient cardio Dr. Stubbs   +edema, orthopnea, SOB at rest. no cp palpitations dizziness syncope   currently on lasix drip @ 20mg/hr  cont to monitor lytes and replete as tolerated  monitor renal function, daily weight, strict I & O  will follow with you   compression wrap b/l LE   +Lt UE DVT, AC on hold 2/2 anemia  cont tele  will follow with you

## 2025-01-16 NOTE — PROGRESS NOTE ADULT - ASSESSMENT
Arun Solares is an 79 year old male with PMHx of HTN, HLD, IDDM2, CAD (s/p CABG), pAF (on rivaroxaban), chronic combined HFpEF and HFrEF (LV EF 25-30% and grade III diastolic dysfunction on echo 8/9/24, s/p AICD placement), hx of severe aortic stenosis (s/p TAVR), chronic hypoxic respiratory failure secondary to COPD (baseline on 3-4L NC at home), CKD stage IV, and BPH who presented to the ED on 1/10/25 for complaints of shortness of breath and admitted for acute on chronic combined HFpEF and HFrEF exacerbation and acute blood loss anemia secondary to suspected upper GI bleed.    # Acute on chronic combined HFpEF and HFrEF exacerbation  -Likely due to non compliance with medications particularly diuretics as per wife.  Sleeps on recliner due to inability to lay flat, denies paroxysmal nocturnal dyspnea.  Pro-BNP 4773 on admission, Prior TTE (on 8/8/24) with LV EF 25-30%, global LV hypokinesis, grade III diastolic dysfunction, moderate pHTN, mild KY, moderate TR   -strict Is and Os, daily weights, low salt diet, 2L fluid restriction  -Continue GDMT with ACEi and beta-blockade  -Some response to aggressive diuresis now on Lasix gtt.  Cardiology input appreciated.  Monitor lytes.   # LUE DVT - noted on LUE U/S.  Holding AC for now due to anemia with positive stool OB.  Awaiting GI iput.   # Acute blood loss anemia secondary to suspected upper GI bleed Differential includes PUD (given recent NSAID use) Reports increased shortness of breath x 2 months, likely component of SOB due to anemia given severity Admits to two black bowel movements, most recent colonoscopy > 10 years ago and had polyps removed at that time Hgb 6.5 on admission, baseline ~14? but most recent hgb 10.4 (on 11/25/24)>>8.5 s/p 2PRBCs in ED  Hgb has been stable.  Will d/w GI.    # HTN: PTA metoprolol succinate 50 mg, ramipril 2.5 mg reordered as lisinopril 10 mg given ramipril not on formulary  # HLD: PTA simvastatin 40 mg reordered as atorvastatin 20 mg given simvastatin not on formulary  # IDDM2 with hyperglycemia: last known A1c 9.4 (on 11/25/24), blood glucose 256 on admission, POC qac and qhs, PTA Lantus 15 U qhs, low dose SSI qac started, blood glucose goal < 180  # CAD (s/p CABG): PTA ASA 81 mg held due to suspected GI bleed  # pAF (on rivaroxaban): PTA rivaroxaban 15 mg held due to suspected GI bleed  # Chronic hypoxic respiratory failure secondary to COPD (baseline on 3-4L NC at home): PTA tiotropium, symbicort reordered as advair, duonebs PRN  # CKD stage IV: BUN 61 and Cr 2.71 on admission, baseline Cr appears to fluctuate but Cr 2.92 (on 11/25/24), avoid nephrotoxins, renally dose meds, encourage PO hydration, pending official nephrology recs  # BPH: PTA tamsulosin 0.4 mg  # Inpatient DVT Prophylaxis - Lower extremity intermittent compression devices.     Time spent by me managing the patient including but not limited to reviewing the chart, discussion with consultants, the IDR team (nurse//care manager/ACP), physical exam and assessment and plan is 52 mins

## 2025-01-16 NOTE — PROGRESS NOTE ADULT - SUBJECTIVE AND OBJECTIVE BOX
[facility] NEPHROLOGY SERVICES, [facility]  NEPHROLOGY AND HYPERTENSION  [address]  [address]  [place] [address]  [phone]    MD DANIEL GARRETT MD YELENA ROSENBERG, MD BINNY KOSHY, MD CHRISTOPHER CAPUTO, MD EDWARD BOVER, MD          Patient events noted  lasix drip   no distress      MEDICATIONS  (STANDING):  albumin human 25% IVPB 50 milliLiter(s) IV Intermittent every 8 hours  atorvastatin 20 milliGRAM(s) Oral at bedtime  dextrose 5%. 1000 milliLiter(s) (100 mL/Hr) IV Continuous <Continuous>  dextrose 5%. 1000 milliLiter(s) (50 mL/Hr) IV Continuous <Continuous>  dextrose 50% Injectable 25 Gram(s) IV Push once  dextrose 50% Injectable 12.5 Gram(s) IV Push once  dextrose 50% Injectable 25 Gram(s) IV Push once  fluticasone propionate/ salmeterol 250-50 MICROgram(s) Diskus 1 Dose(s) Inhalation two times a day  furosemide Infusion 20 mG/Hr (10 mL/Hr) IV Continuous <Continuous>  glucagon  Injectable 1 milliGRAM(s) IntraMuscular once  influenza  Vaccine (HIGH DOSE) 0.5 milliLiter(s) IntraMuscular once  insulin glargine Injectable (LANTUS) 22 Unit(s) SubCutaneous at bedtime  insulin lispro (ADMELOG) corrective regimen sliding scale   SubCutaneous three times a day before meals  iron sucrose Injectable 200 milliGRAM(s) IV Push <User Schedule>  metolazone 5 milliGRAM(s) Oral daily  metoprolol succinate ER 25 milliGRAM(s) Oral daily  pantoprazole  Injectable 40 milliGRAM(s) IV Push two times a day  potassium chloride   Powder 20 milliEquivalent(s) Oral once  potassium chloride  10 mEq/100 mL IVPB 10 milliEquivalent(s) IV Intermittent every 1 hour  tamsulosin 0.4 milliGRAM(s) Oral at bedtime  tiotropium 2.5 MICROgram(s) Inhaler 2 Puff(s) Inhalation daily    MEDICATIONS  (PRN):  acetaminophen     Tablet .. 650 milliGRAM(s) Oral every 6 hours PRN Temp greater or equal to 38C (100.4F), Mild Pain (1 - 3)  albuterol/ipratropium for Nebulization.. 3 milliLiter(s) Inhalation every 6 hours PRN -for shortness of breath and/or wheezing  aluminum hydroxide/magnesium hydroxide/simethicone Suspension 30 milliLiter(s) Oral every 4 hours PRN Dyspepsia  dextrose Oral Gel 15 Gram(s) Oral once PRN Blood Glucose LESS THAN 70 milliGRAM(s)/deciliter  melatonin 3 milliGRAM(s) Oral at bedtime PRN Insomnia  ondansetron Injectable 4 milliGRAM(s) IV Push every 8 hours PRN Nausea and/or Vomiting      01-15-25 @ 07:01  -  25 @ 07:00  --------------------------------------------------------  IN: 0 mL / OUT: 1200 mL / NET: -1200 mL      PHYSICAL EXAM:      T(C): 36.2 (25 @ 10:31), Max: 36.9 (01-15-25 @ 16:10)  HR: 69 (25 @ 10:31) (69 - 88)  BP: 101/61 (25 @ 10:31) (101/61 - 124/70)  RR: 18 (25 @ 10:31) (18 - 18)  SpO2: 88% (25 @ 10:31) (88% - 97%)  Wt(kg): --  Lungs clear  Heart S1S2  Abd soft NT ND  Extremities:   tr edema      < from: US Duplex Venous Upper Ext Ltd, Left (25 @ 02:59) >  ACC: [id number] EXAM:  US DPLX UPR EXT VEINS LTD LT   ORDERED BY: RASTA ARMSTRONG     PROCEDURE DATE:  2025          INTERPRETATION:  CLINICAL INFORMATION: Left arm swelling    COMPARISON: None available.    TECHNIQUE: Duplex sonography of the LEFT UPPER extremity veins with color   and spectral Doppler, with and without compression.    FINDINGS:    There is nonocclusive thrombus within the brachial and basilic vein which   are not fully compressible.    The left internal jugular, left subclavian, axillary, renal and ulnar   veins are patent without evidence of thrombus.    IMPRESSION:  Positive for thrombus within the left brachial and basilic vein.    < end of copied text >                                8.2    11.34 )-----------( 123      ( 2025 07:27 )             28.6         136  |  95[L]  |  51[H]  ----------------------------<  222[H]  3.2[L]   |  38[H]  |  2.10[H]    Ca    9.3      2025 07:27  Phos  3.4       Mg     2.1         TPro  6.4  /  Alb  2.7[L]  /  TBili  1.0  /  DBili  x   /  AST  24  /  ALT  18  /  AlkPhos  196[H]  01-15      LIVER FUNCTIONS - ( 15 Tay 2025 06:55 )  Alb: 2.7 g/dL / Pro: 6.4 gm/dL / ALK PHOS: 196 U/L / ALT: 18 U/L / AST: 24 U/L / GGT: x           Creatinine Trend: 2.10<--, 1.93<--, 2.14<--, 2.51<--, 2.55<--, 2.22<--      Daily Weight Trend:   Weight in k.6 (25 @ 04:48)  Weight in k.6 (01-15-25 @ 04:58)  Weight in k.2 (25 @ 04:59)  Weight in k (25 @ 05:14)        Assessment  DENISHA CKD 3; fluid overload, hx pulm HTN  Hx COPD  Suspected mediation non adherence   LUE DVT    Plan   IV lasix drip 20 mg/hr  I/O, weights    Jon Rowe MD Detail Level: Generalized Include Location In Plan?: No

## 2025-01-16 NOTE — PROGRESS NOTE ADULT - SUBJECTIVE AND OBJECTIVE BOX
Patient is a 79y old  Male who presents with a chief complaint of shortness of breath.    PAST MEDICAL & SURGICAL HISTORY:  HTN (hypertension)    COPD (chronic obstructive pulmonary disease)    HLD (hyperlipidemia)    Insulin dependent type 2 diabetes mellitus    Chronic hypoxic respiratory failure, on home oxygen therapy    Stage 4 chronic kidney disease    Chronic combined systolic and diastolic heart failure    Unspecified atrial fibrillation    S/P CABG x 3  cabg3  in 2003    S/P hernia repair  hernia [id number]    S/P implantation of automatic cardioverter/defibrillator (AICD)    INTERVAL HISTORY:  	  MEDICATIONS:  MEDICATIONS  (STANDING):  albumin human 25% IVPB 50 milliLiter(s) IV Intermittent every 8 hours  atorvastatin 20 milliGRAM(s) Oral at bedtime  fluticasone propionate/ salmeterol 250-50 MICROgram(s) Diskus 1 Dose(s) Inhalation two times a day  furosemide Infusion 20 mG/Hr (10 mL/Hr) IV Continuous <Continuous>  insulin glargine Injectable (LANTUS) 22 Unit(s) SubCutaneous at bedtime  insulin lispro (ADMELOG) corrective regimen sliding scale   SubCutaneous three times a day before meals  iron sucrose Injectable 200 milliGRAM(s) IV Push <User Schedule>  metolazone 5 milliGRAM(s) Oral daily  metoprolol succinate ER 25 milliGRAM(s) Oral daily  pantoprazole  Injectable 40 milliGRAM(s) IV Push two times a day  potassium chloride   Powder 20 milliEquivalent(s) Oral once  potassium chloride  10 mEq/100 mL IVPB 10 milliEquivalent(s) IV Intermittent every 1 hour  tamsulosin 0.4 milliGRAM(s) Oral at bedtime  tiotropium 2.5 MICROgram(s) Inhaler 2 Puff(s) Inhalation daily    MEDICATIONS  (PRN):  acetaminophen     Tablet .. 650 milliGRAM(s) Oral every 6 hours PRN Temp greater or equal to 38C (100.4F), Mild Pain (1 - 3)  albuterol/ipratropium for Nebulization.. 3 milliLiter(s) Inhalation every 6 hours PRN -for shortness of breath and/or wheezing  aluminum hydroxide/magnesium hydroxide/simethicone Suspension 30 milliLiter(s) Oral every 4 hours PRN Dyspepsia  dextrose Oral Gel 15 Gram(s) Oral once PRN Blood Glucose LESS THAN 70 milliGRAM(s)/deciliter  melatonin 3 milliGRAM(s) Oral at bedtime PRN Insomnia  ondansetron Injectable 4 milliGRAM(s) IV Push every 8 hours PRN Nausea and/or Vomiting    Vitals:  T(F): 97.1 (01-16-25 @ 04:48), Max: 98.4 (01-15-25 @ 16:10)  HR: 88 (01-16-25 @ 08:48) (69 - 88)  BP: 119/74 (01-16-25 @ 04:48) (108/61 - 124/70)  RR: 18 (01-16-25 @ 04:48) (18 - 18)  SpO2: 95% (01-16-25 @ 08:48) (95% - 97%)    01-15 @ 07:01  -  01-16 @ 07:00  --------------------------------------------------------  IN:  Total IN: 0 mL    OUT:    Voided (mL): 1200 mL  Total OUT: 1200 mL    Total NET: -1200 mL    PHYSICAL EXAM:  Neuro: Awake, responsive  CV: S1 S2 RRR  Lungs: CTABL  GI: Soft, BS +, ND, NT  Extremities: No edema    TELEMETRY: paced     RADIOLOGY: < from: US Duplex Venous Upper Ext Ltd, Left (01.11.25 @ 02:59) >  Positive for thrombus within the left brachial and basilic vein.    < end of copied text >  < from: Xray Chest 1 View-PORTABLE IMMEDIATE (Xray Chest 1 View-PORTABLE IMMEDIATE .) (01.10.25 @ 17:13) >  IMPRESSION: Stable cardiac findings. Fluid is a left base again seen.   Overall chest has improved from prior.    < end of copied text >    DIAGNOSTIC TESTING:    [x ] Echocardiogram: < from: TTE Echo Complete w/ Contrast w/ Doppler (08.08.24 @ 13:27) >   1. Left ventricular cavity is normal in size. Left ventricular systolic   function is severely decreased with an ejection fraction visually   estimated at 25 to 30 %. Global left ventricular hypokinesis.   2. There is severe (grade 3) left ventricular diastolic dysfunction,   with elevated left ventricular filling pressure.   3. The right ventricle is not well visualized.   4. Device lead is visualized in the right heart.   5. No significant valvular disease.   6. No pericardial effusion seen.   7. Estimated pulmonary artery systolic pressure is 53 mmHg, consistent   with moderate pulmonary hypertension.   8. Left and moderate left pleural effusion noted.   9. Mild pulmonic regurgitation.  10. Moderate tricuspid regurgitation.           < from: NM Pharm Stress Test, Dual Isotope (03.17.21 @ 12:40) >    1. There was scintigraphic evidence for a myocardial infarct in the RCA territory with no significant ischemia.    2. There is severely abnormal left ventricular contractility, globally diminished calculated ejection fraction and no wall motion abnormlaities. Overall post stress ejection fraction was 21%    < end of copied text >    LABS:	 	    16 Jan 2025 07:27    136    |  95     |  51     ----------------------------<  222    3.2     |  38     |  2.10   15 Tay 2025 06:55    138    |  97     |  53     ----------------------------<  255    3.4     |  37     |  1.93   14 Jan 2025 08:15    136    |  96     |  56     ----------------------------<  248    2.9     |  36     |  2.14     Ca    9.3        16 Jan 2025 07:27  Phos  3.4       16 Jan 2025 07:27  Mg     2.1       16 Jan 2025 07:27    TPro  6.4    /  Alb  2.7    /  TBili  1.0    /  DBili  x      /  AST  24     /  ALT  18     /  AlkPhos  196    15 Tay 2025 06:55                        8.2    11.34 )-----------( 123      ( 16 Jan 2025 07:27 )             28.6 ,                       8.5    11.55 )-----------( 137      ( 15 Tay 2025 06:55 )             29.8 ,                       8.4    11.09 )-----------( 136      ( 14 Jan 2025 08:15 )             28.4   pro-BNP: Pro-Brain Natriuretic Peptide: 4773 pg/mL (01.10.25 @ 16:20)                     Patient is a 79y old  Male who presents with a chief complaint of shortness of breath.    PAST MEDICAL & SURGICAL HISTORY:  HTN (hypertension)    COPD (chronic obstructive pulmonary disease)    HLD (hyperlipidemia)    Insulin dependent type 2 diabetes mellitus    Chronic hypoxic respiratory failure, on home oxygen therapy    Stage 4 chronic kidney disease    Chronic combined systolic and diastolic heart failure    Unspecified atrial fibrillation    S/P CABG x 3  cabg3  in 2003    S/P hernia repair  hernia [id number]    S/P implantation of automatic cardioverter/defibrillator (AICD)    INTERVAL HISTORY: still with sob/archer, no chest pain   	  MEDICATIONS:  MEDICATIONS  (STANDING):  albumin human 25% IVPB 50 milliLiter(s) IV Intermittent every 8 hours  atorvastatin 20 milliGRAM(s) Oral at bedtime  fluticasone propionate/ salmeterol 250-50 MICROgram(s) Diskus 1 Dose(s) Inhalation two times a day  furosemide Infusion 20 mG/Hr (10 mL/Hr) IV Continuous <Continuous>  insulin glargine Injectable (LANTUS) 22 Unit(s) SubCutaneous at bedtime  insulin lispro (ADMELOG) corrective regimen sliding scale   SubCutaneous three times a day before meals  iron sucrose Injectable 200 milliGRAM(s) IV Push <User Schedule>  metolazone 5 milliGRAM(s) Oral daily  metoprolol succinate ER 25 milliGRAM(s) Oral daily  pantoprazole  Injectable 40 milliGRAM(s) IV Push two times a day  tamsulosin 0.4 milliGRAM(s) Oral at bedtime  tiotropium 2.5 MICROgram(s) Inhaler 2 Puff(s) Inhalation daily    MEDICATIONS  (PRN):  acetaminophen     Tablet .. 650 milliGRAM(s) Oral every 6 hours PRN Temp greater or equal to 38C (100.4F), Mild Pain (1 - 3)  albuterol/ipratropium for Nebulization.. 3 milliLiter(s) Inhalation every 6 hours PRN -for shortness of breath and/or wheezing  aluminum hydroxide/magnesium hydroxide/simethicone Suspension 30 milliLiter(s) Oral every 4 hours PRN Dyspepsia  dextrose Oral Gel 15 Gram(s) Oral once PRN Blood Glucose LESS THAN 70 milliGRAM(s)/deciliter  melatonin 3 milliGRAM(s) Oral at bedtime PRN Insomnia  ondansetron Injectable 4 milliGRAM(s) IV Push every 8 hours PRN Nausea and/or Vomiting    Vitals:  T(F): 97.1 (01-16-25 @ 04:48), Max: 98.4 (01-15-25 @ 16:10)  HR: 88 (01-16-25 @ 08:48) (69 - 88)  BP: 119/74 (01-16-25 @ 04:48) (108/61 - 124/70)  RR: 18 (01-16-25 @ 04:48) (18 - 18)  SpO2: 95% (01-16-25 @ 08:48) (95% - 97%)    01-15 @ 07:01  -  01-16 @ 07:00  --------------------------------------------------------  IN:  Total IN: 0 mL    OUT:    Voided (mL): 1200 mL  Total OUT: 1200 mL    Total NET: -1200 mL    PHYSICAL EXAM:  Neuro: Awake, responsive  CV: S1 S2 RRR  Lungs: few rales to bases   GI: Softly distended, BS +, NT  Extremities: + LE edema, Lt UE edema     TELEMETRY: paced     RADIOLOGY: < from: US Duplex Venous Upper Ext Ltd, Left (01.11.25 @ 02:59) >  Positive for thrombus within the left brachial and basilic vein.    < end of copied text >  < from: Xray Chest 1 View-PORTABLE IMMEDIATE (Xray Chest 1 View-PORTABLE IMMEDIATE .) (01.10.25 @ 17:13) >  IMPRESSION: Stable cardiac findings. Fluid is a left base again seen.   Overall chest has improved from prior.    < end of copied text >    DIAGNOSTIC TESTING:    [x ] Echocardiogram: < from: TTE Echo Complete w/ Contrast w/ Doppler (08.08.24 @ 13:27) >   1. Left ventricular cavity is normal in size. Left ventricular systolic   function is severely decreased with an ejection fraction visually   estimated at 25 to 30 %. Global left ventricular hypokinesis.   2. There is severe (grade 3) left ventricular diastolic dysfunction,   with elevated left ventricular filling pressure.   3. The right ventricle is not well visualized.   4. Device lead is visualized in the right heart.   5. No significant valvular disease.   6. No pericardial effusion seen.   7. Estimated pulmonary artery systolic pressure is 53 mmHg, consistent   with moderate pulmonary hypertension.   8. Left and moderate left pleural effusion noted.   9. Mild pulmonic regurgitation.  10. Moderate tricuspid regurgitation.           < from: NM Pharm Stress Test, Dual Isotope (03.17.21 @ 12:40) >    1. There was scintigraphic evidence for a myocardial infarct in the RCA territory with no significant ischemia.    2. There is severely abnormal left ventricular contractility, globally diminished calculated ejection fraction and no wall motion abnormlaities. Overall post stress ejection fraction was 21%    < end of copied text >    LABS:	 	    16 Jan 2025 07:27    136    |  95     |  51     ----------------------------<  222    3.2     |  38     |  2.10   15 Tay 2025 06:55    138    |  97     |  53     ----------------------------<  255    3.4     |  37     |  1.93   14 Jan 2025 08:15    136    |  96     |  56     ----------------------------<  248    2.9     |  36     |  2.14     Ca    9.3        16 Jan 2025 07:27  Phos  3.4       16 Jan 2025 07:27  Mg     2.1       16 Jan 2025 07:27    TPro  6.4    /  Alb  2.7    /  TBili  1.0    /  DBili  x      /  AST  24     /  ALT  18     /  AlkPhos  196    15 Tay 2025 06:55                        8.2    11.34 )-----------( 123      ( 16 Jan 2025 07:27 )             28.6 ,                       8.5    11.55 )-----------( 137      ( 15 Tay 2025 06:55 )             29.8 ,                       8.4    11.09 )-----------( 136      ( 14 Jan 2025 08:15 )             28.4   pro-BNP: Pro-Brain Natriuretic Peptide: 4773 pg/mL (01.10.25 @ 16:20)

## 2025-01-16 NOTE — PROGRESS NOTE ADULT - SUBJECTIVE AND OBJECTIVE BOX
Patient: JO VARELA [id number] 79y Male                            Hospitalist Attending Note    Reports improvement in SOB since admission.    No chest pain / palpitations.  No additional complaints.   Seen with wife at bedside.      ____________________PHYSICAL EXAM:  GENERAL:  NAD Alert and Oriented x 3   HEENT: NCAT  CARDIOVASCULAR:  S1, S2  LUNGS: bibasilar crackles.   ABDOMEN:  soft, (-) tenderness, (-) distension, (+) bowel sounds, (-) guarding, (-) rebound (-) rigidity  EXTREMITIES:  no cyanosis / clubbing.  + edema.   ____________________      VITALS:  Vital Signs Last 24 Hrs  T(C): 36.7 (2025 16:29), Max: 36.7 (2025 16:29)  T(F): 98.1 (2025 16:29), Max: 98.1 (2025 16:29)  HR: 71 (2025 16:29) (69 - 88)  BP: 128/75 (2025 16:29) (101/61 - 128/75)  BP(mean): --  RR: 18 (2025 16:29) (18 - 18)  SpO2: 93% (2025 16:29) (88% - 98%)    Parameters below as of 2025 13:00  Patient On (Oxygen Delivery Method): nasal cannula     Daily     Daily Weight in k.6 (2025 04:48)  CAPILLARY BLOOD GLUCOSE      POCT Blood Glucose.: 271 mg/dL (2025 16:57)  POCT Blood Glucose.: 238 mg/dL (2025 11:03)  POCT Blood Glucose.: 264 mg/dL (2025 08:01)  POCT Blood Glucose.: 277 mg/dL (15 Tay 2025 22:45)  POCT Blood Glucose.: 271 mg/dL (15 Tay 2025 21:23)    I&O's Summary    15 Tay 2025 07:01  -  2025 07:00  --------------------------------------------------------  IN: 0 mL / OUT: 1200 mL / NET: -1200 mL    2025 07:01  -  2025 18:43  --------------------------------------------------------  IN: 0 mL / OUT: 750 mL / NET: -750 mL        LABS:                        8.2    11.34 )-----------( 123      ( 2025 07:27 )             28.6         136  |  95[L]  |  51[H]  ----------------------------<  222[H]  3.2[L]   |  38[H]  |  2.10[H]    Ca    9.3      2025 07:27  Phos  3.4       Mg     2.1         TPro  6.4  /  Alb  2.7[L]  /  TBili  1.0  /  DBili  x   /  AST  24  /  ALT  18  /  AlkPhos  196[H]  01-15      LIVER FUNCTIONS - ( 15 Tay 2025 06:55 )  Alb: 2.7 g/dL / Pro: 6.4 gm/dL / ALK PHOS: 196 U/L / ALT: 18 U/L / AST: 24 U/L / GGT: x           Urinalysis Basic - ( 2025 07:27 )    Color: x / Appearance: x / SG: x / pH: x  Gluc: 222 mg/dL / Ketone: x  / Bili: x / Urobili: x   Blood: x / Protein: x / Nitrite: x   Leuk Esterase: x / RBC: x / WBC x   Sq Epi: x / Non Sq Epi: x / Bacteria: x              MEDICATIONS:  acetaminophen     Tablet .. 650 milliGRAM(s) Oral every 6 hours PRN  albuterol/ipratropium for Nebulization.. 3 milliLiter(s) Inhalation every 6 hours PRN  aluminum hydroxide/magnesium hydroxide/simethicone Suspension 30 milliLiter(s) Oral every 4 hours PRN  atorvastatin 20 milliGRAM(s) Oral at bedtime  dextrose 5%. 1000 milliLiter(s) IV Continuous <Continuous>  dextrose 5%. 1000 milliLiter(s) IV Continuous <Continuous>  dextrose 50% Injectable 25 Gram(s) IV Push once  dextrose 50% Injectable 12.5 Gram(s) IV Push once  dextrose 50% Injectable 25 Gram(s) IV Push once  dextrose Oral Gel 15 Gram(s) Oral once PRN  fluticasone propionate/ salmeterol 250-50 MICROgram(s) Diskus 1 Dose(s) Inhalation two times a day  furosemide Infusion 20 mG/Hr IV Continuous <Continuous>  glucagon  Injectable 1 milliGRAM(s) IntraMuscular once  influenza  Vaccine (HIGH DOSE) 0.5 milliLiter(s) IntraMuscular once  insulin glargine Injectable (LANTUS) 22 Unit(s) SubCutaneous at bedtime  insulin lispro (ADMELOG) corrective regimen sliding scale   SubCutaneous three times a day before meals  iron sucrose Injectable 200 milliGRAM(s) IV Push <User Schedule>  melatonin 3 milliGRAM(s) Oral at bedtime PRN  metolazone 5 milliGRAM(s) Oral daily  metoprolol succinate ER 25 milliGRAM(s) Oral daily  ondansetron Injectable 4 milliGRAM(s) IV Push every 8 hours PRN  pantoprazole  Injectable 40 milliGRAM(s) IV Push two times a day  tamsulosin 0.4 milliGRAM(s) Oral at bedtime  tiotropium 2.5 MICROgram(s) Inhaler 2 Puff(s) Inhalation daily

## 2025-01-17 LAB
ANION GAP SERPL CALC-SCNC: 8 MMOL/L — SIGNIFICANT CHANGE UP (ref 5–17)
ANION GAP SERPL CALC-SCNC: 8 MMOL/L — SIGNIFICANT CHANGE UP (ref 5–17)
BUN SERPL-MCNC: 61 MG/DL — HIGH (ref 7–23)
BUN SERPL-MCNC: 63 MG/DL — HIGH (ref 7–23)
CALCIUM SERPL-MCNC: 8.8 MG/DL — SIGNIFICANT CHANGE UP (ref 8.5–10.1)
CALCIUM SERPL-MCNC: 9 MG/DL — SIGNIFICANT CHANGE UP (ref 8.5–10.1)
CHLORIDE SERPL-SCNC: 92 MMOL/L — LOW (ref 96–108)
CHLORIDE SERPL-SCNC: 93 MMOL/L — LOW (ref 96–108)
CO2 SERPL-SCNC: 37 MMOL/L — HIGH (ref 22–31)
CO2 SERPL-SCNC: 37 MMOL/L — HIGH (ref 22–31)
CREAT SERPL-MCNC: 2.47 MG/DL — HIGH (ref 0.5–1.3)
CREAT SERPL-MCNC: 2.57 MG/DL — HIGH (ref 0.5–1.3)
EGFR: 24 ML/MIN/1.73M2 — LOW
EGFR: 26 ML/MIN/1.73M2 — LOW
GLUCOSE BLDC GLUCOMTR-MCNC: 258 MG/DL — HIGH (ref 70–99)
GLUCOSE BLDC GLUCOMTR-MCNC: 286 MG/DL — HIGH (ref 70–99)
GLUCOSE BLDC GLUCOMTR-MCNC: 292 MG/DL — HIGH (ref 70–99)
GLUCOSE BLDC GLUCOMTR-MCNC: 321 MG/DL — HIGH (ref 70–99)
GLUCOSE SERPL-MCNC: 274 MG/DL — HIGH (ref 70–99)
GLUCOSE SERPL-MCNC: 301 MG/DL — HIGH (ref 70–99)
HCT VFR BLD CALC: 28.9 % — LOW (ref 39–50)
HGB BLD-MCNC: 8.1 G/DL — LOW (ref 13–17)
MAGNESIUM SERPL-MCNC: 2 MG/DL — SIGNIFICANT CHANGE UP (ref 1.6–2.6)
MCHC RBC-ENTMCNC: 24.9 PG — LOW (ref 27–34)
MCHC RBC-ENTMCNC: 28 G/DL — LOW (ref 32–36)
MCV RBC AUTO: 88.9 FL — SIGNIFICANT CHANGE UP (ref 80–100)
NRBC # BLD: 1 /100 WBCS — HIGH (ref 0–0)
NRBC BLD-RTO: 1 /100 WBCS — HIGH (ref 0–0)
PHOSPHATE SERPL-MCNC: 3.7 MG/DL — SIGNIFICANT CHANGE UP (ref 2.5–4.5)
PLATELET # BLD AUTO: 120 K/UL — LOW (ref 150–400)
POTASSIUM SERPL-MCNC: 2.9 MMOL/L — CRITICAL LOW (ref 3.5–5.3)
POTASSIUM SERPL-MCNC: 4.1 MMOL/L — SIGNIFICANT CHANGE UP (ref 3.5–5.3)
POTASSIUM SERPL-SCNC: 2.9 MMOL/L — CRITICAL LOW (ref 3.5–5.3)
POTASSIUM SERPL-SCNC: 4.1 MMOL/L — SIGNIFICANT CHANGE UP (ref 3.5–5.3)
RBC # BLD: 3.25 M/UL — LOW (ref 4.2–5.8)
RBC # FLD: 20.5 % — HIGH (ref 10.3–14.5)
SODIUM SERPL-SCNC: 137 MMOL/L — SIGNIFICANT CHANGE UP (ref 135–145)
SODIUM SERPL-SCNC: 138 MMOL/L — SIGNIFICANT CHANGE UP (ref 135–145)
WBC # BLD: 10.28 K/UL — SIGNIFICANT CHANGE UP (ref 3.8–10.5)
WBC # FLD AUTO: 10.28 K/UL — SIGNIFICANT CHANGE UP (ref 3.8–10.5)

## 2025-01-17 PROCEDURE — 99232 SBSQ HOSP IP/OBS MODERATE 35: CPT

## 2025-01-17 PROCEDURE — 99222 1ST HOSP IP/OBS MODERATE 55: CPT

## 2025-01-17 PROCEDURE — 99233 SBSQ HOSP IP/OBS HIGH 50: CPT

## 2025-01-17 RX ORDER — POTASSIUM CHLORIDE 750 MG/1
10 TABLET, EXTENDED RELEASE ORAL
Refills: 0 | Status: COMPLETED | OUTPATIENT
Start: 2025-01-17 | End: 2025-01-17

## 2025-01-17 RX ORDER — POTASSIUM CHLORIDE 750 MG/1
40 TABLET, EXTENDED RELEASE ORAL EVERY 4 HOURS
Refills: 0 | Status: COMPLETED | OUTPATIENT
Start: 2025-01-17 | End: 2025-01-17

## 2025-01-17 RX ORDER — POTASSIUM CHLORIDE 750 MG/1
40 TABLET, EXTENDED RELEASE ORAL EVERY 4 HOURS
Refills: 0 | Status: DISCONTINUED | OUTPATIENT
Start: 2025-01-17 | End: 2025-01-17

## 2025-01-17 RX ADMIN — PANTOPRAZOLE 40 MILLIGRAM(S): 20 TABLET, DELAYED RELEASE ORAL at 17:03

## 2025-01-17 RX ADMIN — ATORVASTATIN CALCIUM 20 MILLIGRAM(S): 80 TABLET, FILM COATED ORAL at 21:34

## 2025-01-17 RX ADMIN — POTASSIUM CHLORIDE 100 MILLIEQUIVALENT(S): 750 TABLET, EXTENDED RELEASE ORAL at 14:39

## 2025-01-17 RX ADMIN — Medication 10 MG/HR: at 21:33

## 2025-01-17 RX ADMIN — POTASSIUM CHLORIDE 40 MILLIEQUIVALENT(S): 750 TABLET, EXTENDED RELEASE ORAL at 08:45

## 2025-01-17 RX ADMIN — INSULIN GLARGINE-YFGN 22 UNIT(S): 100 INJECTION, SOLUTION SUBCUTANEOUS at 21:36

## 2025-01-17 RX ADMIN — FLUTICASONE PROPIONATE AND SALMETEROL 1 DOSE(S): 113; 14 POWDER, METERED RESPIRATORY (INHALATION) at 17:02

## 2025-01-17 RX ADMIN — POTASSIUM CHLORIDE 100 MILLIEQUIVALENT(S): 750 TABLET, EXTENDED RELEASE ORAL at 15:43

## 2025-01-17 RX ADMIN — TIOTROPIUM BROMIDE MONOHYDRATE 2 PUFF(S): 18 CAPSULE ORAL; RESPIRATORY (INHALATION) at 12:15

## 2025-01-17 RX ADMIN — POTASSIUM CHLORIDE 40 MILLIEQUIVALENT(S): 750 TABLET, EXTENDED RELEASE ORAL at 10:17

## 2025-01-17 RX ADMIN — IRON SUCROSE 200 MILLIGRAM(S): 20 INJECTION, SOLUTION INTRAVENOUS at 12:13

## 2025-01-17 RX ADMIN — POTASSIUM CHLORIDE 100 MILLIEQUIVALENT(S): 750 TABLET, EXTENDED RELEASE ORAL at 12:13

## 2025-01-17 RX ADMIN — Medication 80 MILLIGRAM(S): at 22:01

## 2025-01-17 RX ADMIN — Medication 3: at 16:57

## 2025-01-17 RX ADMIN — Medication 25 MILLIGRAM(S): at 06:09

## 2025-01-17 RX ADMIN — TAMSULOSIN HYDROCHLORIDE 0.4 MILLIGRAM(S): 0.4 CAPSULE ORAL at 21:34

## 2025-01-17 RX ADMIN — FLUTICASONE PROPIONATE AND SALMETEROL 1 DOSE(S): 113; 14 POWDER, METERED RESPIRATORY (INHALATION) at 06:09

## 2025-01-17 RX ADMIN — Medication 3: at 08:34

## 2025-01-17 RX ADMIN — Medication 4: at 11:42

## 2025-01-17 RX ADMIN — PANTOPRAZOLE 40 MILLIGRAM(S): 20 TABLET, DELAYED RELEASE ORAL at 06:09

## 2025-01-17 NOTE — CONSULT NOTE ADULT - SUBJECTIVE AND OBJECTIVE BOX
79M with PMHx of HTN, HLD, IDDM2, CAD (s/p CABG), pAF (on rivaroxaban), chronic combined HFpEF and HFrEF (LV EF 25-30% and grade III diastolic dysfunction on echo 8/9/24, s/p AICD placement), hx of severe aortic stenosis (s/p TAVR), chronic hypoxic respiratory failure secondary to COPD (baseline on 3-4L NC at home), CKD stage IV, and BPH who presented to the ED on 1/10/25 for complaints of shortness of breath.    GI consulted for initial hgb of 6.5 and hx of black stools.   Hx obtained from wife at bedside.   Admitted for swelling and productive cough with increased fluid retention and SOB.   Per wife, about two weeks ago had two loose black stools that self resolved and stool became brown again and has stayed brown.   No abdominal pain.   No routine NSAID use.   Remote hx of colonoscopy and endoscopy per wife - about 10 years ago      PMH/PSH--  HTN (hypertension)  DM (diabetes mellitus), type 2  High cholesterol  COPD (chronic obstructive pulmonary disease)  Pacemaker  AICD (automatic cardioverter/defibrillator) present  Stage 4 chronic kidney disease  Chronic combined systolic and diastolic heart failure  Unspecified atrial fibrillation  S/P CABG x 3  S/P hernia repair    Allergies--  No Known Allergies    Medications--  Other: acetaminophen     Tablet .. PRN  albuterol/ipratropium for Nebulization.. PRN  aluminum hydroxide/magnesium hydroxide/simethicone Suspension PRN  atorvastatin  dextrose 5%.  dextrose 5%.  dextrose 50% Injectable  dextrose 50% Injectable  dextrose 50% Injectable  dextrose Oral Gel PRN  fluticasone propionate/ salmeterol 250-50 MICROgram(s) Diskus  furosemide   Injectable  furosemide Infusion  glucagon  Injectable  insulin glargine Injectable (LANTUS)  insulin lispro (ADMELOG) corrective regimen sliding scale  melatonin PRN  metolazone  metoprolol succinate ER  ondansetron Injectable PRN  pantoprazole  Injectable  tamsulosin  tiotropium 2.5 MICROgram(s) Inhaler    Social History--  EtOH: denies   Tobacco: denies   Drug Use: denies     Family/Marital History--  FH: HTN (hypertension)    Review of Systems: unable to obtain 2/2 patient's SOB    Physical Exam--  Vital Signs: T(F): 97.8 (01-17-25 @ 16:12), Max: 98.5 (01-16-25 @ 23:37)  HR: 74 (01-17-25 @ 16:12)  BP: 104/64 (01-17-25 @ 16:12)  RR: 18 (01-17-25 @ 16:12)  SpO2: 94% (01-17-25 @ 16:12)    General: Nontoxic-appearing in no acute distress.  HEENT: AT/NC. Anicteric. Conjunctiva pink and moist.  Neck: Not rigid. No sense of mass.  Nodes: None palpable.  Lungs: Clear bilaterally without rales  Heart: Regular rate and rhythm. No Murmur.  Abdomen: Soft. Nondistended. Nontender.   Extremities: No cyanosis or clubbing.   Skin: Warm. Dry. Good turgor. No rash.   Psychiatric: Appropriate affect and mood for situation.     Laboratory & Imaging Data--  CBC                        8.1    10.28 )-----------( 120      ( 17 Jan 2025 07:20 )             28.9       Chemistries  01-17    137  |  92[L]  |  63[H]  ----------------------------<  301[H]  4.1   |  37[H]  |  2.47[H]    Ca    9.0      17 Jan 2025 15:56  Phos  3.7     01-17  Mg     2.0     01-17      Impression: 79M with multiple cardiopulmonary comorbidities, on ASA and xarelto, with two isolated episodes of reported black stool at home with hgb 6.5. Unable to add iron studies as too long ago. Possible that it could have been GI bleeding, with broad ddx of UGI > LGI etiologies, including ulcer vs AVM vs polyp vs less likely neoplasm. Given pulmonary status, would be high risk for endoscopy. Furthermore, as presumed bleeding self resolved, any endoscopic evaluation would likely be diagnostic. Would defer diagnostic endoscopic evaluaiton given comorbidities and only endoscope if there were concern for ongoing luminal bleeding and there were therapeutic intent.     Recommend:  - PPI 40mg once daily  - trend hgb and monitor bm  - please notify GI if any evidence of luminal GI bleeding    Discussed with hospitalist via Teams

## 2025-01-17 NOTE — PROGRESS NOTE ADULT - SUBJECTIVE AND OBJECTIVE BOX
Patient: JO VARELA [id number] 79y Male                            Hospitalist Attending Note    Denies SOB.  Still edematous.   No chest pain / palpitations.  No additional complaints.   Seen with wife at bedside.      ____________________PHYSICAL EXAM:  GENERAL:  NAD Alert and Oriented x 3   HEENT: NCAT  CARDIOVASCULAR:  S1, S2  LUNGS: bibasilar crackles.   ABDOMEN:  soft, (-) tenderness, (-) distension, (+) bowel sounds, (-) guarding, (-) rebound (-) rigidity  EXTREMITIES:  no cyanosis / clubbing.  3+ edema.   ____________________      VITALS:  Vital Signs Last 24 Hrs  T(C): 36.6 (2025 16:12), Max: 36.9 (2025 23:37)  T(F): 97.8 (2025 16:12), Max: 98.5 (2025 23:37)  HR: 74 (2025 16:12) (66 - 91)  BP: 104/64 (2025 16:12) (102/64 - 118/67)  BP(mean): --  RR: 18 (2025 16:12) (18 - 18)  SpO2: 94% (2025 16:12) (94% - 100%)    Parameters below as of 2025 16:12  Patient On (Oxygen Delivery Method): nasal cannula  O2 Flow (L/min): 3   Daily     Daily Weight in k.8 (2025 04:49)  CAPILLARY BLOOD GLUCOSE      POCT Blood Glucose.: 292 mg/dL (2025 16:43)  POCT Blood Glucose.: 321 mg/dL (2025 11:07)  POCT Blood Glucose.: 286 mg/dL (2025 08:30)  POCT Blood Glucose.: 295 mg/dL (2025 21:03)    I&O's Summary    2025 07:01  -  2025 07:00  --------------------------------------------------------  IN: 0 mL / OUT: 1950 mL / NET: -1950 mL        LABS:                        8.1    10.28 )-----------( 120      ( 2025 07:20 )             28.9         137  |  92[L]  |  63[H]  ----------------------------<  301[H]  4.1   |  37[H]  |  2.47[H]    Ca    9.0      2025 15:56  Phos  3.7       Mg     2.0               Urinalysis Basic - ( 2025 15:56 )    Color: x / Appearance: x / SG: x / pH: x  Gluc: 301 mg/dL / Ketone: x  / Bili: x / Urobili: x   Blood: x / Protein: x / Nitrite: x   Leuk Esterase: x / RBC: x / WBC x   Sq Epi: x / Non Sq Epi: x / Bacteria: x              MEDICATIONS:  acetaminophen     Tablet .. 650 milliGRAM(s) Oral every 6 hours PRN  albuterol/ipratropium for Nebulization.. 3 milliLiter(s) Inhalation every 6 hours PRN  aluminum hydroxide/magnesium hydroxide/simethicone Suspension 30 milliLiter(s) Oral every 4 hours PRN  atorvastatin 20 milliGRAM(s) Oral at bedtime  dextrose 5%. 1000 milliLiter(s) IV Continuous <Continuous>  dextrose 5%. 1000 milliLiter(s) IV Continuous <Continuous>  dextrose 50% Injectable 25 Gram(s) IV Push once  dextrose 50% Injectable 12.5 Gram(s) IV Push once  dextrose 50% Injectable 25 Gram(s) IV Push once  dextrose Oral Gel 15 Gram(s) Oral once PRN  fluticasone propionate/ salmeterol 250-50 MICROgram(s) Diskus 1 Dose(s) Inhalation two times a day  furosemide Infusion 20 mG/Hr IV Continuous <Continuous>  glucagon  Injectable 1 milliGRAM(s) IntraMuscular once  influenza  Vaccine (HIGH DOSE) 0.5 milliLiter(s) IntraMuscular once  insulin glargine Injectable (LANTUS) 22 Unit(s) SubCutaneous at bedtime  insulin lispro (ADMELOG) corrective regimen sliding scale   SubCutaneous three times a day before meals  melatonin 3 milliGRAM(s) Oral at bedtime PRN  metolazone 5 milliGRAM(s) Oral daily  metoprolol succinate ER 25 milliGRAM(s) Oral daily  ondansetron Injectable 4 milliGRAM(s) IV Push every 8 hours PRN  pantoprazole  Injectable 40 milliGRAM(s) IV Push two times a day  tamsulosin 0.4 milliGRAM(s) Oral at bedtime  tiotropium 2.5 MICROgram(s) Inhaler 2 Puff(s) Inhalation daily

## 2025-01-17 NOTE — PROGRESS NOTE ADULT - SUBJECTIVE AND OBJECTIVE BOX
Patient is a 79y old  Male who presents with shortness of breath and acute blood loss anemia     PAST MEDICAL & SURGICAL HISTORY:  HTN (hypertension)    COPD (chronic obstructive pulmonary disease)    HLD (hyperlipidemia)    Insulin dependent type 2 diabetes mellitus    Chronic hypoxic respiratory failure, on home oxygen therapy    Stage 4 chronic kidney disease    Chronic combined systolic and diastolic heart failure    Unspecified atrial fibrillation    S/P CABG x 3    S/P hernia repair  hernia [id number]    S/P implantation of automatic cardioverter/defibrillator (AICD)    INTERVAL HISTORY:  	  MEDICATIONS:  MEDICATIONS  (STANDING):  atorvastatin 20 milliGRAM(s) Oral at bedtime  fluticasone propionate/ salmeterol 250-50 MICROgram(s) Diskus 1 Dose(s) Inhalation two times a day  furosemide Infusion 20 mG/Hr (10 mL/Hr) IV Continuous <Continuous>  insulin glargine Injectable (LANTUS) 22 Unit(s) SubCutaneous at bedtime  insulin lispro (ADMELOG) corrective regimen sliding scale   SubCutaneous three times a day before meals  iron sucrose Injectable 200 milliGRAM(s) IV Push <User Schedule>  metolazone 5 milliGRAM(s) Oral daily  metoprolol succinate ER 25 milliGRAM(s) Oral daily  pantoprazole  Injectable 40 milliGRAM(s) IV Push two times a day  tamsulosin 0.4 milliGRAM(s) Oral at bedtime  tiotropium 2.5 MICROgram(s) Inhaler 2 Puff(s) Inhalation daily    MEDICATIONS  (PRN):  acetaminophen     Tablet .. 650 milliGRAM(s) Oral every 6 hours PRN Temp greater or equal to 38C (100.4F), Mild Pain (1 - 3)  albuterol/ipratropium for Nebulization.. 3 milliLiter(s) Inhalation every 6 hours PRN -for shortness of breath and/or wheezing  aluminum hydroxide/magnesium hydroxide/simethicone Suspension 30 milliLiter(s) Oral every 4 hours PRN Dyspepsia  dextrose Oral Gel 15 Gram(s) Oral once PRN Blood Glucose LESS THAN 70 milliGRAM(s)/deciliter  melatonin 3 milliGRAM(s) Oral at bedtime PRN Insomnia  ondansetron Injectable 4 milliGRAM(s) IV Push every 8 hours PRN Nausea and/or Vomiting    Vitals:  T(F): 97.2 (01-17-25 @ 04:49), Max: 98.5 (01-16-25 @ 23:37)  HR: 82 (01-17-25 @ 04:51) (66 - 91)  BP: 102/64 (01-17-25 @ 04:49) (101/61 - 128/75)  RR: 18 (01-17-25 @ 04:49) (18 - 18)  SpO2: 97% (01-17-25 @ 04:51) (88% - 99%)    01-16 @ 07:01  -  01-17 @ 07:00  --------------------------------------------------------  IN:  Total IN: 0 mL    OUT:    Voided (mL): 1950 mL  Total OUT: 1950 mL    Total NET: -1950 mL    PHYSICAL EXAM:  Neuro: Awake, responsive  CV: S1 S2 RRR  Lungs:   GI: Soft, BS +, ND, NT  Extremities:     TELEMETRY: paced, PVCs, short run of NSVT    RADIOLOGY: < from: US Duplex Venous Upper Ext Ltd, Left (01.11.25 @ 02:59) >  Positive for thrombus within the left brachial and basilic vein.    < end of copied text >  < from: Xray Chest 1 View-PORTABLE IMMEDIATE (Xray Chest 1 View-PORTABLE IMMEDIATE .) (01.10.25 @ 17:13) >    IMPRESSION: Stable cardiac findings. Fluid is a left base again seen.   Overall chest has improved from prior.    < end of copied text >    DIAGNOSTIC TESTING:    [x ] Echocardiogram:< from: TTE Echo Complete w/ Contrast w/ Doppler (08.08.24 @ 13:27) >   1. Left ventricular cavity is normal in size. Left ventricular systolic   function is severely decreased with an ejection fraction visually   estimated at 25 to 30 %. Global left ventricular hypokinesis.   2. There is severe (grade 3) left ventricular diastolic dysfunction,   with elevated left ventricular filling pressure.   3. The right ventricle is not well visualized.   4. Device lead is visualized in the right heart.   5. No significant valvular disease.   6. No pericardial effusion seen.   7. Estimated pulmonary artery systolic pressure is 53 mmHg, consistent   with moderate pulmonary hypertension.   8. Left and moderate left pleural effusion noted.   9. Mild pulmonic regurgitation.  10. Moderate tricuspid regurgitation.    < end of copied text >     from: NM Pharm Stress Test, Dual Isotope (03.17.21 @ 12:40) >  1. There was scintigraphic evidence for a myocardial infarct in the RCA territory with no significant ischemia.    2. There is severely abnormal left ventricular contractility, globally diminished calculated ejection fraction and no wall motion abnormlaities. Overall post stress ejection fraction was 21%    < end of copied text >    LABS:	 	    16 Jan 2025 07:27    136    |  95     |  51     ----------------------------<  222    3.2     |  38     |  2.10   15 Tay 2025 06:55    138    |  97     |  53     ----------------------------<  255    3.4     |  37     |  1.93     Ca    9.3        16 Jan 2025 07:27  Phos  3.4       16 Jan 2025 07:27  Mg     2.1       16 Jan 2025 07:27                        8.1    10.28 )-----------( 120      ( 17 Jan 2025 07:20 )             28.9 ,                       8.2    11.34 )-----------( 123      ( 16 Jan 2025 07:27 )             28.6 ,                       8.5    11.55 )-----------( 137      ( 15 Tay 2025 06:55 )             29.8   pro-BNP: Pro-Brain Natriuretic Peptide: 4773 pg/mL (01.10.25 @ 16:20)                     Patient is a 79y old  Male who presents with shortness of breath and acute blood loss anemia     PAST MEDICAL & SURGICAL HISTORY:  HTN (hypertension)    COPD (chronic obstructive pulmonary disease)    HLD (hyperlipidemia)    Insulin dependent type 2 diabetes mellitus    Chronic hypoxic respiratory failure, on home oxygen therapy    Stage 4 chronic kidney disease    Chronic combined systolic and diastolic heart failure    Unspecified atrial fibrillation    S/P CABG x 3    S/P hernia repair  hernia [id number]    S/P implantation of automatic cardioverter/defibrillator (AICD)    INTERVAL HISTORY: +mild sob/archer, no chest pain   	  MEDICATIONS:  MEDICATIONS  (STANDING):  atorvastatin 20 milliGRAM(s) Oral at bedtime  fluticasone propionate/ salmeterol 250-50 MICROgram(s) Diskus 1 Dose(s) Inhalation two times a day  furosemide Infusion 20 mG/Hr (10 mL/Hr) IV Continuous <Continuous>  insulin glargine Injectable (LANTUS) 22 Unit(s) SubCutaneous at bedtime  insulin lispro (ADMELOG) corrective regimen sliding scale   SubCutaneous three times a day before meals  iron sucrose Injectable 200 milliGRAM(s) IV Push <User Schedule>  metolazone 5 milliGRAM(s) Oral daily  metoprolol succinate ER 25 milliGRAM(s) Oral daily  pantoprazole  Injectable 40 milliGRAM(s) IV Push two times a day  tamsulosin 0.4 milliGRAM(s) Oral at bedtime  tiotropium 2.5 MICROgram(s) Inhaler 2 Puff(s) Inhalation daily    MEDICATIONS  (PRN):  acetaminophen     Tablet .. 650 milliGRAM(s) Oral every 6 hours PRN Temp greater or equal to 38C (100.4F), Mild Pain (1 - 3)  albuterol/ipratropium for Nebulization.. 3 milliLiter(s) Inhalation every 6 hours PRN -for shortness of breath and/or wheezing  aluminum hydroxide/magnesium hydroxide/simethicone Suspension 30 milliLiter(s) Oral every 4 hours PRN Dyspepsia  dextrose Oral Gel 15 Gram(s) Oral once PRN Blood Glucose LESS THAN 70 milliGRAM(s)/deciliter  melatonin 3 milliGRAM(s) Oral at bedtime PRN Insomnia  ondansetron Injectable 4 milliGRAM(s) IV Push every 8 hours PRN Nausea and/or Vomiting    Vitals:  T(F): 97.2 (01-17-25 @ 04:49), Max: 98.5 (01-16-25 @ 23:37)  HR: 82 (01-17-25 @ 04:51) (66 - 91)  BP: 102/64 (01-17-25 @ 04:49) (101/61 - 128/75)  RR: 18 (01-17-25 @ 04:49) (18 - 18)  SpO2: 97% (01-17-25 @ 04:51) (88% - 99%)    01-16 @ 07:01  -  01-17 @ 07:00  --------------------------------------------------------  IN:  Total IN: 0 mL    OUT:    Voided (mL): 1950 mL  Total OUT: 1950 mL    Total NET: -1950 mL    PHYSICAL EXAM:  Neuro: Awake, responsive  CV: S1 S2 RRR  Lungs: diminished to bases   GI: Soft, BS +, ND, NT  Extremities: +LE edema, Lt UE edema     TELEMETRY: paced, PVCs, short run of NSVT    RADIOLOGY: < from: US Duplex Venous Upper Ext Ltd, Left (01.11.25 @ 02:59) >  Positive for thrombus within the left brachial and basilic vein.    < end of copied text >  < from: Xray Chest 1 View-PORTABLE IMMEDIATE (Xray Chest 1 View-PORTABLE IMMEDIATE .) (01.10.25 @ 17:13) >    IMPRESSION: Stable cardiac findings. Fluid is a left base again seen.   Overall chest has improved from prior.    < end of copied text >    DIAGNOSTIC TESTING:    [x ] Echocardiogram:< from: TTE Echo Complete w/ Contrast w/ Doppler (08.08.24 @ 13:27) >   1. Left ventricular cavity is normal in size. Left ventricular systolic   function is severely decreased with an ejection fraction visually   estimated at 25 to 30 %. Global left ventricular hypokinesis.   2. There is severe (grade 3) left ventricular diastolic dysfunction,   with elevated left ventricular filling pressure.   3. The right ventricle is not well visualized.   4. Device lead is visualized in the right heart.   5. No significant valvular disease.   6. No pericardial effusion seen.   7. Estimated pulmonary artery systolic pressure is 53 mmHg, consistent   with moderate pulmonary hypertension.   8. Left and moderate left pleural effusion noted.   9. Mild pulmonic regurgitation.  10. Moderate tricuspid regurgitation.    < end of copied text >     from: NM Pharm Stress Test, Dual Isotope (03.17.21 @ 12:40) >  1. There was scintigraphic evidence for a myocardial infarct in the RCA territory with no significant ischemia.    2. There is severely abnormal left ventricular contractility, globally diminished calculated ejection fraction and no wall motion abnormlaities. Overall post stress ejection fraction was 21%    < end of copied text >    LABS:	 	    16 Jan 2025 07:27    136    |  95     |  51     ----------------------------<  222    3.2     |  38     |  2.10   15 Tay 2025 06:55    138    |  97     |  53     ----------------------------<  255    3.4     |  37     |  1.93     Ca    9.3        16 Jan 2025 07:27  Phos  3.4       16 Jan 2025 07:27  Mg     2.1       16 Jan 2025 07:27                        8.1    10.28 )-----------( 120      ( 17 Jan 2025 07:20 )             28.9 ,                       8.2    11.34 )-----------( 123      ( 16 Jan 2025 07:27 )             28.6 ,                       8.5    11.55 )-----------( 137      ( 15 Tay 2025 06:55 )             29.8   pro-BNP: Pro-Brain Natriuretic Peptide: 4773 pg/mL (01.10.25 @ 16:20)

## 2025-01-17 NOTE — PROGRESS NOTE ADULT - SUBJECTIVE AND OBJECTIVE BOX
[facility] NEPHROLOGY SERVICES, [facility]  NEPHROLOGY AND HYPERTENSION  [address]  [address]  [place] [address]  [phone]    MD DANIEL GARRETT MD YELENA ROSENBERG, MD BINNY KOSHY, MD CHRISTOPHER CAPUTO, MD EDWARD BOVER, MD          Patient events noted  No distress    MEDICATIONS  (STANDING):  atorvastatin 20 milliGRAM(s) Oral at bedtime  dextrose 5%. 1000 milliLiter(s) (100 mL/Hr) IV Continuous <Continuous>  dextrose 5%. 1000 milliLiter(s) (50 mL/Hr) IV Continuous <Continuous>  dextrose 50% Injectable 25 Gram(s) IV Push once  dextrose 50% Injectable 12.5 Gram(s) IV Push once  dextrose 50% Injectable 25 Gram(s) IV Push once  fluticasone propionate/ salmeterol 250-50 MICROgram(s) Diskus 1 Dose(s) Inhalation two times a day  furosemide Infusion 20 mG/Hr (10 mL/Hr) IV Continuous <Continuous>  glucagon  Injectable 1 milliGRAM(s) IntraMuscular once  influenza  Vaccine (HIGH DOSE) 0.5 milliLiter(s) IntraMuscular once  insulin glargine Injectable (LANTUS) 22 Unit(s) SubCutaneous at bedtime  insulin lispro (ADMELOG) corrective regimen sliding scale   SubCutaneous three times a day before meals  metolazone 5 milliGRAM(s) Oral daily  metoprolol succinate ER 25 milliGRAM(s) Oral daily  pantoprazole  Injectable 40 milliGRAM(s) IV Push two times a day  tamsulosin 0.4 milliGRAM(s) Oral at bedtime  tiotropium 2.5 MICROgram(s) Inhaler 2 Puff(s) Inhalation daily    MEDICATIONS  (PRN):  acetaminophen     Tablet .. 650 milliGRAM(s) Oral every 6 hours PRN Temp greater or equal to 38C (100.4F), Mild Pain (1 - 3)  albuterol/ipratropium for Nebulization.. 3 milliLiter(s) Inhalation every 6 hours PRN -for shortness of breath and/or wheezing  aluminum hydroxide/magnesium hydroxide/simethicone Suspension 30 milliLiter(s) Oral every 4 hours PRN Dyspepsia  dextrose Oral Gel 15 Gram(s) Oral once PRN Blood Glucose LESS THAN 70 milliGRAM(s)/deciliter  melatonin 3 milliGRAM(s) Oral at bedtime PRN Insomnia  ondansetron Injectable 4 milliGRAM(s) IV Push every 8 hours PRN Nausea and/or Vomiting      01-16-25 @ 07:01  -  01-17-25 @ 07:00  --------------------------------------------------------  IN: 0 mL / OUT: 1950 mL / NET: -1950 mL    01-17-25 @ 07:01  -  01-17-25 @ 21:54  --------------------------------------------------------  IN: 0 mL / OUT: 400 mL / NET: -400 mL      PHYSICAL EXAM:      T(C): 36.6 (01-17-25 @ 16:12), Max: 36.9 (01-16-25 @ 23:37)  HR: 74 (01-17-25 @ 16:12) (66 - 91)  BP: 104/64 (01-17-25 @ 16:12) (102/64 - 118/67)  RR: 18 (01-17-25 @ 16:12) (18 - 18)  SpO2: 94% (01-17-25 @ 16:12) (94% - 100%)  Wt(kg): --  Lungs clear  Heart S1S2  Abd soft NT ND  Extremities:   2 edema                                    8.1    10.28 )-----------( 120      ( 17 Jan 2025 07:20 )             28.9     01-17    137  |  92[L]  |  63[H]  ----------------------------<  301[H]  4.1   |  37[H]  |  2.47[H]    Ca    9.0      17 Jan 2025 15:56  Phos  3.7     01-17  Mg     2.0     01-17          Creatinine Trend: 2.47<--, 2.57<--, 2.10<--, 1.93<--, 2.14<--, 2.51<--          Assessment  DENISHA CKD 3; fluid overload, hx pulm HTN  Hx COPD  Suspected mediation non adherence   LUE DVT    Plan   Increase lasix drip 30 mg/hr  I/O, weights    Jon Rowe MD

## 2025-01-17 NOTE — PROGRESS NOTE ADULT - ASSESSMENT
Arun Solares is an 79 year old male with PMHx of HTN, HLD, IDDM2, CAD (s/p CABG), pAF (on rivaroxaban), chronic combined HFpEF and HFrEF (LV EF 25-30% and grade III diastolic dysfunction on echo 8/9/24, s/p AICD placement), hx of severe aortic stenosis (s/p TAVR), chronic hypoxic respiratory failure secondary to COPD (baseline on 3-4L NC at home), CKD stage IV, and BPH who presented to the ED on 1/10/25 for complaints of shortness of breath and admitted for acute on chronic combined HFpEF and HFrEF exacerbation and acute blood loss anemia secondary to suspected upper GI bleed.    # Acute on chronic combined HFpEF and HFrEF exacerbation  -Likely due to non compliance with medications particularly diuretics as per wife.  Sleeps on recliner due to inability to lay flat, denies paroxysmal nocturnal dyspnea.  Pro-BNP 4773 on admission, Prior TTE (on 8/8/24) with LV EF 25-30%, global LV hypokinesis, grade III diastolic dysfunction, moderate pHTN, mild TN, moderate TR   -strict Is and Os, daily weights, low salt diet, 2L fluid restriction  -Continue GDMT with ACEi and beta-blockade  -Some response to aggressive diuresis now on Lasix gtt.  Cardiology input appreciated.  Monitor lytes.     # LUE DVT - noted on LUE U/S.  Holding AC for now due to anemia with positive stool OB.   # Acute blood loss anemia secondary to suspected upper GI bleed Differential includes PUD (given recent NSAID use) Reports increased shortness of breath x 2 months, likely component of SOB due to anemia given severity Admits to two black bowel movements, most recent colonoscopy > 10 years ago and had polyps removed at that time Hgb 6.5 on admission, baseline ~14? but most recent hgb 10.4 (on 11/25/24)>>8.5 s/p 2PRBCs in ED  Hgb has been stable.   # HTN: PTA metoprolol succinate 50 mg, ramipril 2.5 mg reordered as lisinopril 10 mg given ramipril not on formulary  # HLD: PTA simvastatin 40 mg reordered as atorvastatin 20 mg given simvastatin not on formulary  # IDDM2 with hyperglycemia: last known A1c 9.4 (on 11/25/24), blood glucose 256 on admission, POC qac and qhs, PTA Lantus 15 U qhs, low dose SSI qac started, blood glucose goal < 180  # CAD (s/p CABG): PTA ASA 81 mg held due to suspected GI bleed  # pAF (on rivaroxaban): PTA rivaroxaban 15 mg held due to suspected GI bleed  # Chronic hypoxic respiratory failure secondary to COPD (baseline on 3-4L NC at home): PTA tiotropium, symbicort reordered as advair, duonebs PRN  # CKD stage IV: BUN 61 and Cr 2.71 on admission, baseline Cr appears to fluctuate but Cr 2.92 (on 11/25/24), avoid nephrotoxins, renally dose meds, encourage PO hydration, pending official nephrology recs  # BPH: PTA tamsulosin 0.4 mg  # Inpatient DVT Prophylaxis - Lower extremity intermittent compression devices.     Risks, benefits and alternatives to AC were discussed with wife.  Continue to hold as slow drop in H/H noted.  If stable, will consider initiation of AC.    D/w wife at bedside 1/17

## 2025-01-17 NOTE — PROGRESS NOTE ADULT - ASSESSMENT
79M with CAD/CABG, HFrEF 25%, ICD, afib, copd on home o2, htn hld dm p/w adhf.     ADHF  remains grossly overloaded  +edema, orthopnea, SOB at rest. no cp palpitations dizziness syncope   currently on lasix drip @ 20mg/hr  cont to monitor lytes bid while on lasix drip and replete as tolerated  monitor renal function, daily weight, strict I & O  cont on toprol 25/d  consider compression wrap b/l LE   +Lt UE DVT, AC on hold 2/2 anemia  cont tele  will follow with you

## 2025-01-18 LAB
ANION GAP SERPL CALC-SCNC: 6 MMOL/L — SIGNIFICANT CHANGE UP (ref 5–17)
APTT BLD: 29.5 SEC — SIGNIFICANT CHANGE UP (ref 24.5–35.6)
APTT BLD: >200 SEC — CRITICAL HIGH (ref 24.5–35.6)
BUN SERPL-MCNC: 63 MG/DL — HIGH (ref 7–23)
CALCIUM SERPL-MCNC: 9.2 MG/DL — SIGNIFICANT CHANGE UP (ref 8.5–10.1)
CHLORIDE SERPL-SCNC: 92 MMOL/L — LOW (ref 96–108)
CO2 SERPL-SCNC: 39 MMOL/L — HIGH (ref 22–31)
CREAT SERPL-MCNC: 2.28 MG/DL — HIGH (ref 0.5–1.3)
EGFR: 28 ML/MIN/1.73M2 — LOW
GLUCOSE BLDC GLUCOMTR-MCNC: 191 MG/DL — HIGH (ref 70–99)
GLUCOSE BLDC GLUCOMTR-MCNC: 235 MG/DL — HIGH (ref 70–99)
GLUCOSE BLDC GLUCOMTR-MCNC: 271 MG/DL — HIGH (ref 70–99)
GLUCOSE BLDC GLUCOMTR-MCNC: 327 MG/DL — HIGH (ref 70–99)
GLUCOSE SERPL-MCNC: 171 MG/DL — HIGH (ref 70–99)
HCT VFR BLD CALC: 28.6 % — LOW (ref 39–50)
HCT VFR BLD CALC: 29.4 % — LOW (ref 39–50)
HGB BLD-MCNC: 8.3 G/DL — LOW (ref 13–17)
HGB BLD-MCNC: 8.4 G/DL — LOW (ref 13–17)
MAGNESIUM SERPL-MCNC: 2 MG/DL — SIGNIFICANT CHANGE UP (ref 1.6–2.6)
MCHC RBC-ENTMCNC: 25.5 PG — LOW (ref 27–34)
MCHC RBC-ENTMCNC: 26.3 PG — LOW (ref 27–34)
MCHC RBC-ENTMCNC: 28.6 G/DL — LOW (ref 32–36)
MCHC RBC-ENTMCNC: 29 G/DL — LOW (ref 32–36)
MCV RBC AUTO: 89.4 FL — SIGNIFICANT CHANGE UP (ref 80–100)
MCV RBC AUTO: 90.5 FL — SIGNIFICANT CHANGE UP (ref 80–100)
NRBC # BLD: 1 /100 WBCS — HIGH (ref 0–0)
NRBC # BLD: 2 /100 WBCS — HIGH (ref 0–0)
NRBC BLD-RTO: 1 /100 WBCS — HIGH (ref 0–0)
NRBC BLD-RTO: 2 /100 WBCS — HIGH (ref 0–0)
PHOSPHATE SERPL-MCNC: 3.9 MG/DL — SIGNIFICANT CHANGE UP (ref 2.5–4.5)
PLATELET # BLD AUTO: 121 K/UL — LOW (ref 150–400)
PLATELET # BLD AUTO: 122 K/UL — LOW (ref 150–400)
POTASSIUM SERPL-MCNC: 2.9 MMOL/L — CRITICAL LOW (ref 3.5–5.3)
POTASSIUM SERPL-SCNC: 2.9 MMOL/L — CRITICAL LOW (ref 3.5–5.3)
RBC # BLD: 3.16 M/UL — LOW (ref 4.2–5.8)
RBC # BLD: 3.29 M/UL — LOW (ref 4.2–5.8)
RBC # FLD: 22.2 % — HIGH (ref 10.3–14.5)
RBC # FLD: 23 % — HIGH (ref 10.3–14.5)
SODIUM SERPL-SCNC: 137 MMOL/L — SIGNIFICANT CHANGE UP (ref 135–145)
WBC # BLD: 11.39 K/UL — HIGH (ref 3.8–10.5)
WBC # BLD: 11.6 K/UL — HIGH (ref 3.8–10.5)
WBC # FLD AUTO: 11.39 K/UL — HIGH (ref 3.8–10.5)
WBC # FLD AUTO: 11.6 K/UL — HIGH (ref 3.8–10.5)

## 2025-01-18 PROCEDURE — 99232 SBSQ HOSP IP/OBS MODERATE 35: CPT

## 2025-01-18 PROCEDURE — 99233 SBSQ HOSP IP/OBS HIGH 50: CPT

## 2025-01-18 RX ORDER — HEPARIN SODIUM,PORCINE 10000/ML
4000 VIAL (ML) INJECTION EVERY 6 HOURS
Refills: 0 | Status: DISCONTINUED | OUTPATIENT
Start: 2025-01-18 | End: 2025-01-18

## 2025-01-18 RX ORDER — INSULIN GLARGINE-YFGN 100 [IU]/ML
28 INJECTION, SOLUTION SUBCUTANEOUS AT BEDTIME
Refills: 0 | Status: DISCONTINUED | OUTPATIENT
Start: 2025-01-18 | End: 2025-01-22

## 2025-01-18 RX ORDER — HEPARIN SODIUM,PORCINE 10000/ML
VIAL (ML) INJECTION
Qty: 25000 | Refills: 0 | Status: DISCONTINUED | OUTPATIENT
Start: 2025-01-18 | End: 2025-01-18

## 2025-01-18 RX ORDER — POTASSIUM CHLORIDE 750 MG/1
40 TABLET, EXTENDED RELEASE ORAL EVERY 4 HOURS
Refills: 0 | Status: COMPLETED | OUTPATIENT
Start: 2025-01-18 | End: 2025-01-18

## 2025-01-18 RX ORDER — HEPARIN SODIUM,PORCINE 10000/ML
9000 VIAL (ML) INJECTION EVERY 6 HOURS
Refills: 0 | Status: DISCONTINUED | OUTPATIENT
Start: 2025-01-18 | End: 2025-01-18

## 2025-01-18 RX ORDER — HEPARIN SODIUM,PORCINE 10000/ML
VIAL (ML) INJECTION
Qty: 25000 | Refills: 0 | Status: DISCONTINUED | OUTPATIENT
Start: 2025-01-18 | End: 2025-01-22

## 2025-01-18 RX ORDER — HEPARIN SODIUM,PORCINE 10000/ML
9000 VIAL (ML) INJECTION EVERY 6 HOURS
Refills: 0 | Status: DISCONTINUED | OUTPATIENT
Start: 2025-01-18 | End: 2025-01-22

## 2025-01-18 RX ORDER — HEPARIN SODIUM,PORCINE 10000/ML
4500 VIAL (ML) INJECTION EVERY 6 HOURS
Refills: 0 | Status: DISCONTINUED | OUTPATIENT
Start: 2025-01-18 | End: 2025-01-23

## 2025-01-18 RX ORDER — POTASSIUM CHLORIDE 750 MG/1
10 TABLET, EXTENDED RELEASE ORAL
Refills: 0 | Status: COMPLETED | OUTPATIENT
Start: 2025-01-18 | End: 2025-01-18

## 2025-01-18 RX ADMIN — Medication 15 MG/HR: at 00:26

## 2025-01-18 RX ADMIN — Medication 15 MG/HR: at 01:38

## 2025-01-18 RX ADMIN — Medication 25 MILLIGRAM(S): at 05:42

## 2025-01-18 RX ADMIN — Medication 1600 UNIT(S)/HR: at 20:11

## 2025-01-18 RX ADMIN — POTASSIUM CHLORIDE 100 MILLIEQUIVALENT(S): 750 TABLET, EXTENDED RELEASE ORAL at 11:08

## 2025-01-18 RX ADMIN — POTASSIUM CHLORIDE 100 MILLIEQUIVALENT(S): 750 TABLET, EXTENDED RELEASE ORAL at 08:58

## 2025-01-18 RX ADMIN — Medication 15 MG/HR: at 21:30

## 2025-01-18 RX ADMIN — ATORVASTATIN CALCIUM 20 MILLIGRAM(S): 80 TABLET, FILM COATED ORAL at 21:30

## 2025-01-18 RX ADMIN — POTASSIUM CHLORIDE 100 MILLIEQUIVALENT(S): 750 TABLET, EXTENDED RELEASE ORAL at 10:00

## 2025-01-18 RX ADMIN — FLUTICASONE PROPIONATE AND SALMETEROL 1 DOSE(S): 113; 14 POWDER, METERED RESPIRATORY (INHALATION) at 17:45

## 2025-01-18 RX ADMIN — Medication 2000 UNIT(S)/HR: at 11:37

## 2025-01-18 RX ADMIN — Medication 15 MG/HR: at 22:34

## 2025-01-18 RX ADMIN — TIOTROPIUM BROMIDE MONOHYDRATE 2 PUFF(S): 18 CAPSULE ORAL; RESPIRATORY (INHALATION) at 11:31

## 2025-01-18 RX ADMIN — Medication 9000 UNIT(S): at 11:42

## 2025-01-18 RX ADMIN — POTASSIUM CHLORIDE 40 MILLIEQUIVALENT(S): 750 TABLET, EXTENDED RELEASE ORAL at 11:32

## 2025-01-18 RX ADMIN — Medication 3: at 11:31

## 2025-01-18 RX ADMIN — POTASSIUM CHLORIDE 40 MILLIEQUIVALENT(S): 750 TABLET, EXTENDED RELEASE ORAL at 08:57

## 2025-01-18 RX ADMIN — INSULIN GLARGINE-YFGN 28 UNIT(S): 100 INJECTION, SOLUTION SUBCUTANEOUS at 21:31

## 2025-01-18 RX ADMIN — PANTOPRAZOLE 40 MILLIGRAM(S): 20 TABLET, DELAYED RELEASE ORAL at 05:42

## 2025-01-18 RX ADMIN — Medication 0 UNIT(S)/HR: at 19:22

## 2025-01-18 RX ADMIN — FLUTICASONE PROPIONATE AND SALMETEROL 1 DOSE(S): 113; 14 POWDER, METERED RESPIRATORY (INHALATION) at 05:45

## 2025-01-18 RX ADMIN — PANTOPRAZOLE 40 MILLIGRAM(S): 20 TABLET, DELAYED RELEASE ORAL at 17:47

## 2025-01-18 RX ADMIN — Medication 4: at 16:51

## 2025-01-18 RX ADMIN — Medication 15 MG/HR: at 22:26

## 2025-01-18 RX ADMIN — TAMSULOSIN HYDROCHLORIDE 0.4 MILLIGRAM(S): 0.4 CAPSULE ORAL at 21:30

## 2025-01-18 RX ADMIN — Medication 0 UNIT(S)/HR: at 18:53

## 2025-01-18 RX ADMIN — Medication 1: at 08:25

## 2025-01-18 NOTE — PROVIDER CONTACT NOTE (CRITICAL VALUE NOTIFICATION) - ACTION/TREATMENT ORDERED:
pauased for 60mins and will resume the drip after in lower dose of 16ml/hr. paused for 60mins and will resume the drip after in lower dose of 16ml/hr.

## 2025-01-18 NOTE — PROGRESS NOTE ADULT - SUBJECTIVE AND OBJECTIVE BOX
[facility] NEPHROLOGY SERVICES, [facility]  NEPHROLOGY AND HYPERTENSION  [address]  [address]  [place] [address]  [phone]    MD DANIEL GARRETT MD YELENA ROSENBERG, MD BINNY KOSHY, MD CHRISTOPHER CAPUTO, MD EDWARD BOVER, MD          Patient events noted    MEDICATIONS  (STANDING):  atorvastatin 20 milliGRAM(s) Oral at bedtime  dextrose 5%. 1000 milliLiter(s) (100 mL/Hr) IV Continuous <Continuous>  dextrose 5%. 1000 milliLiter(s) (50 mL/Hr) IV Continuous <Continuous>  dextrose 50% Injectable 25 Gram(s) IV Push once  dextrose 50% Injectable 12.5 Gram(s) IV Push once  dextrose 50% Injectable 25 Gram(s) IV Push once  fluticasone propionate/ salmeterol 250-50 MICROgram(s) Diskus 1 Dose(s) Inhalation two times a day  furosemide Infusion 30 mG/Hr (15 mL/Hr) IV Continuous <Continuous>  glucagon  Injectable 1 milliGRAM(s) IntraMuscular once  heparin  Infusion.  Unit(s)/Hr (20 mL/Hr) IV Continuous <Continuous>  influenza  Vaccine (HIGH DOSE) 0.5 milliLiter(s) IntraMuscular once  insulin glargine Injectable (LANTUS) 22 Unit(s) SubCutaneous at bedtime  insulin lispro (ADMELOG) corrective regimen sliding scale   SubCutaneous three times a day before meals  metolazone 5 milliGRAM(s) Oral daily  metoprolol succinate ER 25 milliGRAM(s) Oral daily  pantoprazole  Injectable 40 milliGRAM(s) IV Push two times a day  tamsulosin 0.4 milliGRAM(s) Oral at bedtime  tiotropium 2.5 MICROgram(s) Inhaler 2 Puff(s) Inhalation daily    MEDICATIONS  (PRN):  acetaminophen     Tablet .. 650 milliGRAM(s) Oral every 6 hours PRN Temp greater or equal to 38C (100.4F), Mild Pain (1 - 3)  albuterol/ipratropium for Nebulization.. 3 milliLiter(s) Inhalation every 6 hours PRN -for shortness of breath and/or wheezing  aluminum hydroxide/magnesium hydroxide/simethicone Suspension 30 milliLiter(s) Oral every 4 hours PRN Dyspepsia  dextrose Oral Gel 15 Gram(s) Oral once PRN Blood Glucose LESS THAN 70 milliGRAM(s)/deciliter  heparin   Injectable 9000 Unit(s) IV Push every 6 hours PRN For aPTT less than 40  heparin   Injectable 4500 Unit(s) IV Push every 6 hours PRN For aPTT between 40 - 57  melatonin 3 milliGRAM(s) Oral at bedtime PRN Insomnia  ondansetron Injectable 4 milliGRAM(s) IV Push every 8 hours PRN Nausea and/or Vomiting      01-17-25 @ 07:01  -  01-18-25 @ 07:00  --------------------------------------------------------  IN: 0 mL / OUT: 1200 mL / NET: -1200 mL    01-18-25 @ 07:01  -  01-18-25 @ 18:16  --------------------------------------------------------  IN: 0 mL / OUT: 270 mL / NET: -270 mL      PHYSICAL EXAM:      T(C): 36.7 (01-18-25 @ 16:12), Max: 36.7 (01-18-25 @ 10:56)  HR: 70 (01-18-25 @ 16:12) (70 - 83)  BP: 107/70 (01-18-25 @ 16:12) (107/70 - 123/74)  RR: 18 (01-18-25 @ 16:12) (18 - 18)  SpO2: 95% (01-18-25 @ 16:12) (92% - 99%)  Wt(kg): --  Lungs clear  Heart S1S2  Abd soft NT ND  Extremities:   2-3 edema                                    8.4    11.60 )-----------( 121      ( 18 Jan 2025 06:45 )             29.4     01-18    137  |  92[L]  |  63[H]  ----------------------------<  171[H]  2.9[LL]   |  39[H]  |  2.28[H]    Ca    9.2      18 Jan 2025 06:45  Phos  3.9     01-18  Mg     2.0     01-18          Creatinine Trend: 2.28<--, 2.47<--, 2.57<--, 2.10<--, 1.93<--, 2.14<--      Assessment  DENISHA CKD 3; fluid overload, hx pulm HTN  Hx COPD  Suspected mediation non adherence   LUE DVT    Plan   Increased lasix drip 30 mg/hr  I/O, weights  Jon Rowe MD

## 2025-01-18 NOTE — PROGRESS NOTE ADULT - SUBJECTIVE AND OBJECTIVE BOX
Cardiology Progress Note    Interval Events:  No significant events    MEDICATIONS:  furosemide Infusion 30 mG/Hr IV Continuous <Continuous>  heparin   Injectable 9000 Unit(s) IV Push every 6 hours PRN  heparin   Injectable 4500 Unit(s) IV Push every 6 hours PRN  heparin  Infusion.  Unit(s)/Hr IV Continuous <Continuous>  metolazone 5 milliGRAM(s) Oral daily  metoprolol succinate ER 25 milliGRAM(s) Oral daily  albuterol/ipratropium for Nebulization.. 3 milliLiter(s) Inhalation every 6 hours PRN  fluticasone propionate/ salmeterol 250-50 MICROgram(s) Diskus 1 Dose(s) Inhalation two times a day  tiotropium 2.5 MICROgram(s) Inhaler 2 Puff(s) Inhalation daily  acetaminophen     Tablet .. 650 milliGRAM(s) Oral every 6 hours PRN  melatonin 3 milliGRAM(s) Oral at bedtime PRN  ondansetron Injectable 4 milliGRAM(s) IV Push every 8 hours PRN  aluminum hydroxide/magnesium hydroxide/simethicone Suspension 30 milliLiter(s) Oral every 4 hours PRN  pantoprazole  Injectable 40 milliGRAM(s) IV Push two times a day  atorvastatin 20 milliGRAM(s) Oral at bedtime  dextrose 50% Injectable 25 Gram(s) IV Push once  dextrose 50% Injectable 12.5 Gram(s) IV Push once  dextrose 50% Injectable 25 Gram(s) IV Push once  dextrose Oral Gel 15 Gram(s) Oral once PRN  glucagon  Injectable 1 milliGRAM(s) IntraMuscular once  insulin glargine Injectable (LANTUS) 22 Unit(s) SubCutaneous at bedtime  insulin lispro (ADMELOG) corrective regimen sliding scale   SubCutaneous three times a day before meals  dextrose 5%. 1000 milliLiter(s) IV Continuous <Continuous>  dextrose 5%. 1000 milliLiter(s) IV Continuous <Continuous>  influenza  Vaccine (HIGH DOSE) 0.5 milliLiter(s) IntraMuscular once  tamsulosin 0.4 milliGRAM(s) Oral at bedtime    PHYSICAL EXAM:  T(C): 36.7 (01-18-25 @ 10:56), Max: 36.7 (01-18-25 @ 10:56)  HR: 71 (01-18-25 @ 10:56) (71 - 83)  BP: 118/76 (01-18-25 @ 10:56) (104/64 - 123/74)  RR: 18 (01-18-25 @ 10:56) (18 - 18)  SpO2: 96% (01-18-25 @ 10:56) (92% - 99%)  Wt(kg): --  I&O's Summary    17 Jan 2025 07:01  -  18 Jan 2025 07:00  --------------------------------------------------------  IN: 0 mL / OUT: 1200 mL / NET: -1200 mL    18 Jan 2025 07:01  -  18 Jan 2025 13:22  --------------------------------------------------------  IN: 0 mL / OUT: 100 mL / NET: -100 mL      Appearance: No acute distress  HEENT:  mmm  Cardiovascular: Normal S1 S2, ++elevated JVP, no murmurs, 2+ LE edema  Respiratory: bibasilar crackles  Psychiatry: Mood & affect appropriate  Gastrointestinal:  soft distended  Skin:  no cyanosis	    LABS:	 	  CBC Full  -  ( 18 Jan 2025 06:45 )  WBC Count : 11.60 K/uL  Hemoglobin : 8.4 g/dL  Hematocrit : 29.4 %  Platelet Count - Automated : 121 K/uL    01-18  137  |  92[L]  |  63[H]  ----------------------------<  171[H]  2.9[LL]   |  39[H]  |  2.28[H]    01-17  137  |  92[L]  |  63[H]  ----------------------------<  301[H]  4.1   |  37[H]  |  2.47[H]    Ca    9.2      18 Jan 2025 06:45  Ca    9.0      17 Jan 2025 15:56  Phos  3.9     01-18  Phos  3.7     01-17  Mg     2.0     01-18  Mg     2.0     01-17    TELEMETRY: 	 , PVC   ECG:  	  RADIOLOGY:  OTHER: 	    PREVIOUS DIAGNOSTIC TESTING:    [ ] Echocardiogram:   [ ] Catheterization:  [ ] Stress Test:

## 2025-01-18 NOTE — PROGRESS NOTE ADULT - SUBJECTIVE AND OBJECTIVE BOX
Patient: JO VARELA [id number] 79y Male                            Hospitalist Attending Note    Denies SOB.  Still edematous.   No chest pain / palpitations.  No additional complaints.   Seen with wife at bedside.      ____________________PHYSICAL EXAM:  GENERAL:  NAD Alert and Oriented x 3   HEENT: NCAT  CARDIOVASCULAR:  S1, S2  LUNGS: bibasilar crackles.   ABDOMEN:  soft, (-) tenderness, (-) distension, (+) bowel sounds, (-) guarding, (-) rebound (-) rigidity  EXTREMITIES:  no cyanosis / clubbing.  3+ edema.   ____________________      VITALS:  Vital Signs Last 24 Hrs  T(C): 36.7 (2025 16:12), Max: 36.7 (2025 10:56)  T(F): 98.1 (2025 16:12), Max: 98.1 (2025 16:12)  HR: 70 (2025 16:12) (70 - 83)  BP: 107/70 (2025 16:12) (107/70 - 123/74)  BP(mean): --  RR: 18 (2025 16:12) (18 - 18)  SpO2: 95% (2025 16:12) (92% - 99%)    Parameters below as of 2025 16:12  Patient On (Oxygen Delivery Method): nasal cannula  O2 Flow (L/min): 3   Daily     Daily Weight in k.9 (2025 10:27)  CAPILLARY BLOOD GLUCOSE      POCT Blood Glucose.: 327 mg/dL (2025 16:45)  POCT Blood Glucose.: 271 mg/dL (2025 11:05)  POCT Blood Glucose.: 191 mg/dL (2025 07:50)  POCT Blood Glucose.: 258 mg/dL (2025 21:13)    I&O's Summary    2025 07:01  -  2025 07:00  --------------------------------------------------------  IN: 0 mL / OUT: 1200 mL / NET: -1200 mL    2025 07:01  -  2025 18:31  --------------------------------------------------------  IN: 0 mL / OUT: 270 mL / NET: -270 mL        LABS:                        8.3    11.39 )-----------( 122      ( 2025 18:00 )             28.6         137  |  92[L]  |  63[H]  ----------------------------<  171[H]  2.9[LL]   |  39[H]  |  2.28[H]    Ca    9.2      2025 06:45  Phos  3.9       Mg     2.0           PTT - ( 2025 09:58 )  PTT:29.5 sec    Urinalysis Basic - ( 2025 06:45 )    Color: x / Appearance: x / SG: x / pH: x  Gluc: 171 mg/dL / Ketone: x  / Bili: x / Urobili: x   Blood: x / Protein: x / Nitrite: x   Leuk Esterase: x / RBC: x / WBC x   Sq Epi: x / Non Sq Epi: x / Bacteria: x              MEDICATIONS:  acetaminophen     Tablet .. 650 milliGRAM(s) Oral every 6 hours PRN  albuterol/ipratropium for Nebulization.. 3 milliLiter(s) Inhalation every 6 hours PRN  aluminum hydroxide/magnesium hydroxide/simethicone Suspension 30 milliLiter(s) Oral every 4 hours PRN  atorvastatin 20 milliGRAM(s) Oral at bedtime  dextrose 5%. 1000 milliLiter(s) IV Continuous <Continuous>  dextrose 5%. 1000 milliLiter(s) IV Continuous <Continuous>  dextrose 50% Injectable 25 Gram(s) IV Push once  dextrose 50% Injectable 12.5 Gram(s) IV Push once  dextrose 50% Injectable 25 Gram(s) IV Push once  dextrose Oral Gel 15 Gram(s) Oral once PRN  fluticasone propionate/ salmeterol 250-50 MICROgram(s) Diskus 1 Dose(s) Inhalation two times a day  furosemide Infusion 30 mG/Hr IV Continuous <Continuous>  glucagon  Injectable 1 milliGRAM(s) IntraMuscular once  heparin   Injectable 9000 Unit(s) IV Push every 6 hours PRN  heparin   Injectable 4500 Unit(s) IV Push every 6 hours PRN  heparin  Infusion.  Unit(s)/Hr IV Continuous <Continuous>  influenza  Vaccine (HIGH DOSE) 0.5 milliLiter(s) IntraMuscular once  insulin glargine Injectable (LANTUS) 28 Unit(s) SubCutaneous at bedtime  insulin lispro (ADMELOG) corrective regimen sliding scale   SubCutaneous three times a day before meals  melatonin 3 milliGRAM(s) Oral at bedtime PRN  metolazone 5 milliGRAM(s) Oral daily  metoprolol succinate ER 25 milliGRAM(s) Oral daily  ondansetron Injectable 4 milliGRAM(s) IV Push every 8 hours PRN  pantoprazole  Injectable 40 milliGRAM(s) IV Push two times a day  tamsulosin 0.4 milliGRAM(s) Oral at bedtime  tiotropium 2.5 MICROgram(s) Inhaler 2 Puff(s) Inhalation daily

## 2025-01-18 NOTE — PROGRESS NOTE ADULT - ASSESSMENT
79M with CAD/CABG, HFrEF 25%, ICD, afib, copd on home o2, htn hld dm p/w adhf.     ADHF  remains grossly overloaded  renal function slightly improving    cont lasix gtt  cont metolazone  add spironolactone 25mg daily  increase in weight but I/O's net negative  check lactate    check BID lytes, replete electrolytes aggressively    +Lt UE DVT, AC on hold 2/2 anemia

## 2025-01-18 NOTE — PROGRESS NOTE ADULT - ASSESSMENT
Arun Solares is an 79 year old male with PMHx of HTN, HLD, IDDM2, CAD (s/p CABG), pAF (on rivaroxaban), chronic combined HFpEF and HFrEF (LV EF 25-30% and grade III diastolic dysfunction on echo 8/9/24, s/p AICD placement), hx of severe aortic stenosis (s/p TAVR), chronic hypoxic respiratory failure secondary to COPD (baseline on 3-4L NC at home), CKD stage IV, and BPH who presented to the ED on 1/10/25 for complaints of shortness of breath and admitted for acute on chronic combined HFpEF and HFrEF exacerbation and acute blood loss anemia secondary to suspected upper GI bleed.    # Acute on chronic combined HFpEF and HFrEF exacerbation  -Likely due to non compliance with medications particularly diuretics as per wife.  Sleeps on recliner due to inability to lay flat, denies paroxysmal nocturnal dyspnea.  Pro-BNP 4773 on admission, Prior TTE (on 8/8/24) with LV EF 25-30%, global LV hypokinesis, grade III diastolic dysfunction, moderate pHTN, mild [place], moderate TR   -strict Is and Os, daily weights, low salt diet, 2L fluid restriction  -Continue GDMT with ACEi and beta-blockade  -Some response to aggressive diuresis now on Lasix gtt.  Cardiology input appreciated.  Monitor lytes.   # Hypokalemia - in setting of Lasix gtt.  Continue to supplement lytes.    # LUE DVT - noted on LUE U/S.  Hgb stable.  Start Heparin gtt.  Risks, benefits and alternatives to AC were discussed with wife 1/18.  IN agreement with starting AC.       # Acute blood loss anemia secondary to suspected upper GI bleed Differential includes PUD (given recent NSAID use) Reports increased shortness of breath x 2 months, likely component of SOB due to anemia given severity Admits to two black bowel movements, most recent colonoscopy > 10 years ago and had polyps removed at that time Hgb 6.5 on admission, baseline ~14? but most recent hgb 10.4 (on 11/25/24)>>8.5 s/p 2PRBCs in ED  Hgb has been stable. Gi recommendations appreciated - pt does not appear to be actively bleeding, defers endoscopic evaluation for now unless bleeding recurs.    # HTN: PTA metoprolol succinate 50 mg, ramipril 2.5 mg reordered as lisinopril 10 mg given ramipril not on formulary  # HLD: PTA simvastatin 40 mg reordered as atorvastatin 20 mg given simvastatin not on formulary  # IDDM2 with hyperglycemia: last known A1c 9.4 (on 11/25/24), blood glucose 256 on admission, POC qac and qhs, PTA Lantus 15 U qhs, low dose SSI qac started, blood glucose goal < 180  # CAD (s/p CABG): PTA ASA 81 mg held due to suspected GI bleed  # pAF (on rivaroxaban): PTA rivaroxaban 15 mg held due to suspected GI bleed  # Chronic hypoxic respiratory failure secondary to COPD (baseline on 3-4L NC at home): PTA tiotropium, symbicort reordered as advair, duonebs PRN  # CKD stage IV: BUN 61 and Cr 2.71 on admission, baseline Cr appears to fluctuate but Cr 2.92 (on 11/25/24), avoid nephrotoxins, renally dose meds, encourage PO hydration, pending official nephrology recs  # BPH: PTA tamsulosin 0.4 mg  # Inpatient DVT Prophylaxis - Lower extremity intermittent compression devices.

## 2025-01-19 LAB
ANION GAP SERPL CALC-SCNC: 8 MMOL/L — SIGNIFICANT CHANGE UP (ref 5–17)
APTT BLD: 103.2 SEC — HIGH (ref 24.5–35.6)
APTT BLD: 175.8 SEC — CRITICAL HIGH (ref 24.5–35.6)
APTT BLD: 83.2 SEC — HIGH (ref 24.5–35.6)
BUN SERPL-MCNC: 63 MG/DL — HIGH (ref 7–23)
CALCIUM SERPL-MCNC: 9.1 MG/DL — SIGNIFICANT CHANGE UP (ref 8.5–10.1)
CHLORIDE SERPL-SCNC: 91 MMOL/L — LOW (ref 96–108)
CO2 SERPL-SCNC: 41 MMOL/L — HIGH (ref 22–31)
CREAT SERPL-MCNC: 2.35 MG/DL — HIGH (ref 0.5–1.3)
EGFR: 27 ML/MIN/1.73M2 — LOW
GLUCOSE BLDC GLUCOMTR-MCNC: 136 MG/DL — HIGH (ref 70–99)
GLUCOSE BLDC GLUCOMTR-MCNC: 223 MG/DL — HIGH (ref 70–99)
GLUCOSE BLDC GLUCOMTR-MCNC: 224 MG/DL — HIGH (ref 70–99)
GLUCOSE BLDC GLUCOMTR-MCNC: 248 MG/DL — HIGH (ref 70–99)
GLUCOSE BLDC GLUCOMTR-MCNC: 250 MG/DL — HIGH (ref 70–99)
GLUCOSE SERPL-MCNC: 133 MG/DL — HIGH (ref 70–99)
HCT VFR BLD CALC: 30.1 % — LOW (ref 39–50)
HGB BLD-MCNC: 8.6 G/DL — LOW (ref 13–17)
MCHC RBC-ENTMCNC: 25.8 PG — LOW (ref 27–34)
MCHC RBC-ENTMCNC: 28.6 G/DL — LOW (ref 32–36)
MCV RBC AUTO: 90.4 FL — SIGNIFICANT CHANGE UP (ref 80–100)
NRBC # BLD: 0 /100 WBCS — SIGNIFICANT CHANGE UP (ref 0–0)
NRBC BLD-RTO: 0 /100 WBCS — SIGNIFICANT CHANGE UP (ref 0–0)
PLATELET # BLD AUTO: 131 K/UL — LOW (ref 150–400)
POTASSIUM SERPL-MCNC: 2.7 MMOL/L — CRITICAL LOW (ref 3.5–5.3)
POTASSIUM SERPL-MCNC: 4 MMOL/L — SIGNIFICANT CHANGE UP (ref 3.5–5.3)
POTASSIUM SERPL-SCNC: 2.7 MMOL/L — CRITICAL LOW (ref 3.5–5.3)
POTASSIUM SERPL-SCNC: 4 MMOL/L — SIGNIFICANT CHANGE UP (ref 3.5–5.3)
RBC # BLD: 3.33 M/UL — LOW (ref 4.2–5.8)
RBC # FLD: 24.1 % — HIGH (ref 10.3–14.5)
SODIUM SERPL-SCNC: 140 MMOL/L — SIGNIFICANT CHANGE UP (ref 135–145)
WBC # BLD: 12.17 K/UL — HIGH (ref 3.8–10.5)
WBC # FLD AUTO: 12.17 K/UL — HIGH (ref 3.8–10.5)

## 2025-01-19 PROCEDURE — 99232 SBSQ HOSP IP/OBS MODERATE 35: CPT

## 2025-01-19 PROCEDURE — 99233 SBSQ HOSP IP/OBS HIGH 50: CPT

## 2025-01-19 RX ORDER — POTASSIUM CHLORIDE 750 MG/1
40 TABLET, EXTENDED RELEASE ORAL EVERY 4 HOURS
Refills: 0 | Status: COMPLETED | OUTPATIENT
Start: 2025-01-19 | End: 2025-01-19

## 2025-01-19 RX ORDER — POTASSIUM CHLORIDE 750 MG/1
10 TABLET, EXTENDED RELEASE ORAL
Refills: 0 | Status: COMPLETED | OUTPATIENT
Start: 2025-01-19 | End: 2025-01-19

## 2025-01-19 RX ADMIN — INSULIN GLARGINE-YFGN 28 UNIT(S): 100 INJECTION, SOLUTION SUBCUTANEOUS at 22:34

## 2025-01-19 RX ADMIN — PANTOPRAZOLE 40 MILLIGRAM(S): 20 TABLET, DELAYED RELEASE ORAL at 17:43

## 2025-01-19 RX ADMIN — Medication 2: at 17:43

## 2025-01-19 RX ADMIN — POTASSIUM CHLORIDE 100 MILLIEQUIVALENT(S): 750 TABLET, EXTENDED RELEASE ORAL at 15:44

## 2025-01-19 RX ADMIN — POTASSIUM CHLORIDE 40 MILLIEQUIVALENT(S): 750 TABLET, EXTENDED RELEASE ORAL at 13:10

## 2025-01-19 RX ADMIN — Medication 1000 UNIT(S)/HR: at 19:56

## 2025-01-19 RX ADMIN — Medication 1000 UNIT(S)/HR: at 13:56

## 2025-01-19 RX ADMIN — Medication 1200 UNIT(S)/HR: at 07:54

## 2025-01-19 RX ADMIN — Medication 1000 UNIT(S)/HR: at 22:35

## 2025-01-19 RX ADMIN — FLUTICASONE PROPIONATE AND SALMETEROL 1 DOSE(S): 113; 14 POWDER, METERED RESPIRATORY (INHALATION) at 06:01

## 2025-01-19 RX ADMIN — TAMSULOSIN HYDROCHLORIDE 0.4 MILLIGRAM(S): 0.4 CAPSULE ORAL at 22:35

## 2025-01-19 RX ADMIN — Medication 1200 UNIT(S)/HR: at 05:00

## 2025-01-19 RX ADMIN — POTASSIUM CHLORIDE 40 MILLIEQUIVALENT(S): 750 TABLET, EXTENDED RELEASE ORAL at 12:01

## 2025-01-19 RX ADMIN — IPRATROPIUM BROMIDE AND ALBUTEROL SULFATE 3 MILLILITER(S): .5; 2.5 SOLUTION RESPIRATORY (INHALATION) at 14:06

## 2025-01-19 RX ADMIN — Medication 0 UNIT(S)/HR: at 04:50

## 2025-01-19 RX ADMIN — POTASSIUM CHLORIDE 100 MILLIEQUIVALENT(S): 750 TABLET, EXTENDED RELEASE ORAL at 18:55

## 2025-01-19 RX ADMIN — Medication 25 MILLIGRAM(S): at 06:05

## 2025-01-19 RX ADMIN — Medication 2: at 12:00

## 2025-01-19 RX ADMIN — PANTOPRAZOLE 40 MILLIGRAM(S): 20 TABLET, DELAYED RELEASE ORAL at 06:07

## 2025-01-19 RX ADMIN — ATORVASTATIN CALCIUM 20 MILLIGRAM(S): 80 TABLET, FILM COATED ORAL at 22:35

## 2025-01-19 RX ADMIN — TIOTROPIUM BROMIDE MONOHYDRATE 2 PUFF(S): 18 CAPSULE ORAL; RESPIRATORY (INHALATION) at 12:01

## 2025-01-19 RX ADMIN — POTASSIUM CHLORIDE 100 MILLIEQUIVALENT(S): 750 TABLET, EXTENDED RELEASE ORAL at 17:44

## 2025-01-19 RX ADMIN — ACETAMINOPHEN, DIPHENHYDRAMINE HCL, PHENYLEPHRINE HCL 3 MILLIGRAM(S): 325; 25; 5 TABLET ORAL at 22:34

## 2025-01-19 RX ADMIN — FLUTICASONE PROPIONATE AND SALMETEROL 1 DOSE(S): 113; 14 POWDER, METERED RESPIRATORY (INHALATION) at 17:49

## 2025-01-19 RX ADMIN — Medication 0 UNIT(S)/HR: at 03:41

## 2025-01-19 NOTE — PROGRESS NOTE ADULT - SUBJECTIVE AND OBJECTIVE BOX
Patient: JO VARELA [id number] 79y Male                            Hospitalist Attending Note    Denies SOB.  Still edematous.   No chest pain / palpitations.  No additional complaints.   Seen with wife at bedside.    No bleeding    ____________________PHYSICAL EXAM:  GENERAL:  NAD Alert and Oriented x 3   HEENT: NCAT  CARDIOVASCULAR:  S1, S2  LUNGS: bibasilar crackles.   ABDOMEN:  soft, (-) tenderness, (-) distension, (+) bowel sounds, (-) guarding, (-) rebound (-) rigidity  EXTREMITIES:  no cyanosis / clubbing.  3+ edema.   ____________________        VITALS:  Vital Signs Last 24 Hrs  T(C): 36.7 (19 Jan 2025 11:03), Max: 36.9 (19 Jan 2025 04:49)  T(F): 98.1 (19 Jan 2025 11:03), Max: 98.4 (19 Jan 2025 04:49)  HR: 70 (19 Jan 2025 14:10) (67 - 72)  BP: 109/64 (19 Jan 2025 11:03) (107/70 - 115/69)  BP(mean): --  RR: 18 (19 Jan 2025 11:03) (18 - 18)  SpO2: 100% (19 Jan 2025 14:10) (94% - 100%)    Parameters below as of 19 Jan 2025 14:10  Patient On (Oxygen Delivery Method): nasal cannula w/ humidification     Daily     Daily   CAPILLARY BLOOD GLUCOSE      POCT Blood Glucose.: 223 mg/dL (19 Jan 2025 11:13)  POCT Blood Glucose.: 136 mg/dL (19 Jan 2025 09:06)  POCT Blood Glucose.: 235 mg/dL (18 Jan 2025 21:13)  POCT Blood Glucose.: 327 mg/dL (18 Jan 2025 16:45)    I&O's Summary    18 Jan 2025 07:01  -  19 Jan 2025 07:00  --------------------------------------------------------  IN: 0 mL / OUT: 1270 mL / NET: -1270 mL    19 Jan 2025 07:01  -  19 Jan 2025 15:08  --------------------------------------------------------  IN: 0 mL / OUT: 470 mL / NET: -470 mL        LABS:                        8.6    12.17 )-----------( 131      ( 19 Jan 2025 06:20 )             30.1     01-19    140  |  91[L]  |  63[H]  ----------------------------<  133[H]  2.7[LL]   |  41[H]  |  2.35[H]    Ca    9.1      19 Jan 2025 06:20  Phos  3.9     01-18  Mg     2.0     01-18      PTT - ( 19 Jan 2025 12:40 )  PTT:103.2 sec    Urinalysis Basic - ( 19 Jan 2025 06:20 )    Color: x / Appearance: x / SG: x / pH: x  Gluc: 133 mg/dL / Ketone: x  / Bili: x / Urobili: x   Blood: x / Protein: x / Nitrite: x   Leuk Esterase: x / RBC: x / WBC x   Sq Epi: x / Non Sq Epi: x / Bacteria: x              MEDICATIONS:  acetaminophen     Tablet .. 650 milliGRAM(s) Oral every 6 hours PRN  albuterol/ipratropium for Nebulization.. 3 milliLiter(s) Inhalation every 6 hours PRN  aluminum hydroxide/magnesium hydroxide/simethicone Suspension 30 milliLiter(s) Oral every 4 hours PRN  atorvastatin 20 milliGRAM(s) Oral at bedtime  dextrose 5%. 1000 milliLiter(s) IV Continuous <Continuous>  dextrose 5%. 1000 milliLiter(s) IV Continuous <Continuous>  dextrose 50% Injectable 25 Gram(s) IV Push once  dextrose 50% Injectable 12.5 Gram(s) IV Push once  dextrose 50% Injectable 25 Gram(s) IV Push once  dextrose Oral Gel 15 Gram(s) Oral once PRN  fluticasone propionate/ salmeterol 250-50 MICROgram(s) Diskus 1 Dose(s) Inhalation two times a day  furosemide Infusion 30 mG/Hr IV Continuous <Continuous>  glucagon  Injectable 1 milliGRAM(s) IntraMuscular once  heparin   Injectable 9000 Unit(s) IV Push every 6 hours PRN  heparin   Injectable 4500 Unit(s) IV Push every 6 hours PRN  heparin  Infusion.  Unit(s)/Hr IV Continuous <Continuous>  influenza  Vaccine (HIGH DOSE) 0.5 milliLiter(s) IntraMuscular once  insulin glargine Injectable (LANTUS) 28 Unit(s) SubCutaneous at bedtime  insulin lispro (ADMELOG) corrective regimen sliding scale   SubCutaneous three times a day before meals  melatonin 3 milliGRAM(s) Oral at bedtime PRN  metolazone 5 milliGRAM(s) Oral daily  metoprolol succinate ER 25 milliGRAM(s) Oral daily  ondansetron Injectable 4 milliGRAM(s) IV Push every 8 hours PRN  pantoprazole  Injectable 40 milliGRAM(s) IV Push two times a day  potassium chloride  10 mEq/100 mL IVPB 10 milliEquivalent(s) IV Intermittent every 1 hour  tamsulosin 0.4 milliGRAM(s) Oral at bedtime  tiotropium 2.5 MICROgram(s) Inhaler 2 Puff(s) Inhalation daily

## 2025-01-19 NOTE — PROGRESS NOTE ADULT - SUBJECTIVE AND OBJECTIVE BOX
Cardiology Progress Note    Interval Events:  continues to diurese      MEDICATIONS:  furosemide Infusion 30 mG/Hr IV Continuous <Continuous>  heparin   Injectable 9000 Unit(s) IV Push every 6 hours PRN  heparin   Injectable 4500 Unit(s) IV Push every 6 hours PRN  heparin  Infusion.  Unit(s)/Hr IV Continuous <Continuous>  metolazone 5 milliGRAM(s) Oral daily  metoprolol succinate ER 25 milliGRAM(s) Oral daily      albuterol/ipratropium for Nebulization.. 3 milliLiter(s) Inhalation every 6 hours PRN  fluticasone propionate/ salmeterol 250-50 MICROgram(s) Diskus 1 Dose(s) Inhalation two times a day  tiotropium 2.5 MICROgram(s) Inhaler 2 Puff(s) Inhalation daily    acetaminophen     Tablet .. 650 milliGRAM(s) Oral every 6 hours PRN  melatonin 3 milliGRAM(s) Oral at bedtime PRN  ondansetron Injectable 4 milliGRAM(s) IV Push every 8 hours PRN    aluminum hydroxide/magnesium hydroxide/simethicone Suspension 30 milliLiter(s) Oral every 4 hours PRN  pantoprazole  Injectable 40 milliGRAM(s) IV Push two times a day    atorvastatin 20 milliGRAM(s) Oral at bedtime  dextrose 50% Injectable 25 Gram(s) IV Push once  dextrose 50% Injectable 12.5 Gram(s) IV Push once  dextrose 50% Injectable 25 Gram(s) IV Push once  dextrose Oral Gel 15 Gram(s) Oral once PRN  glucagon  Injectable 1 milliGRAM(s) IntraMuscular once  insulin glargine Injectable (LANTUS) 28 Unit(s) SubCutaneous at bedtime  insulin lispro (ADMELOG) corrective regimen sliding scale   SubCutaneous three times a day before meals    dextrose 5%. 1000 milliLiter(s) IV Continuous <Continuous>  dextrose 5%. 1000 milliLiter(s) IV Continuous <Continuous>  influenza  Vaccine (HIGH DOSE) 0.5 milliLiter(s) IntraMuscular once  tamsulosin 0.4 milliGRAM(s) Oral at bedtime      PHYSICAL EXAM:  T(C): 36.7 (01-19-25 @ 11:03), Max: 36.9 (01-19-25 @ 04:49)  HR: 70 (01-19-25 @ 14:10) (67 - 72)  BP: 109/64 (01-19-25 @ 11:03) (107/70 - 115/69)  RR: 18 (01-19-25 @ 11:03) (18 - 18)  SpO2: 100% (01-19-25 @ 14:10) (94% - 100%)  Wt(kg): --  I&O's Summary    18 Jan 2025 07:01  -  19 Jan 2025 07:00  --------------------------------------------------------  IN: 0 mL / OUT: 1270 mL / NET: -1270 mL    19 Jan 2025 07:01  -  19 Jan 2025 14:39  --------------------------------------------------------  IN: 0 mL / OUT: 470 mL / NET: -470 mL    Appearance: No acute distress  HEENT:  mmm  Cardiovascular: Normal S1 S2, +elevated JVP, no murmurs, 2+ LE edema  Respiratory: bibasilar crackles  Psychiatry: Mood & affect appropriate  Gastrointestinal:  soft distended  Skin:  no cyanosis	    LABS:	 	  CBC Full  -  ( 19 Jan 2025 06:20 )  WBC Count : 12.17 K/uL  Hemoglobin : 8.6 g/dL  Hematocrit : 30.1 %  Platelet Count - Automated : 131 K/uL      01-19  140  |  91[L]  |  63[H]  ----------------------------<  133[H]  2.7[LL]   |  41[H]  |  2.35[H]  01-18    137  |  92[L]  |  63[H]  ----------------------------<  171[H]  2.9[LL]   |  39[H]  |  2.28[H]    Ca    9.1      19 Jan 2025 06:20  Ca    9.2      18 Jan 2025 06:45  Phos  3.9     01-18  Phos  3.7     01-17  Mg     2.0     01-18  Mg     2.0     01-17        proBNP:   Lipid Profile:   HgA1c:   TSH:     CARDIAC MARKERS:        TELEMETRY: 	 , NSVT   ECG:  	  RADIOLOGY:  OTHER: 	    PREVIOUS DIAGNOSTIC TESTING:    [ ] Echocardiogram:   [ ] Catheterization:  [ ] Stress Test:

## 2025-01-19 NOTE — PROGRESS NOTE ADULT - ASSESSMENT
79M with CAD/CABG, HFrEF 25%, ICD, afib, copd on home o2, htn hld dm p/w adhf.     ADHF  remains grossly overloaded  renal function slightly improving    cont lasix gtt  cont metolazone  add spironolactone 25mg daily  increase in weight but I/O's net negative  check lactate    check BID lytes, replete electrolytes aggressively    +Lt UE DVT

## 2025-01-19 NOTE — PROGRESS NOTE ADULT - ASSESSMENT
Arun Solares is an 79 year old male with PMHx of HTN, HLD, IDDM2, CAD (s/p CABG), pAF (on rivaroxaban), chronic combined HFpEF and HFrEF (LV EF 25-30% and grade III diastolic dysfunction on echo 8/9/24, s/p AICD placement), hx of severe aortic stenosis (s/p TAVR), chronic hypoxic respiratory failure secondary to COPD (baseline on 3-4L NC at home), CKD stage IV, and BPH who presented to the ED on 1/10/25 for complaints of shortness of breath and admitted for acute on chronic combined HFpEF and HFrEF exacerbation and acute blood loss anemia secondary to suspected upper GI bleed.    # Acute on chronic combined HFpEF and HFrEF exacerbation  -Likely due to non compliance with medications particularly diuretics as per wife.  Sleeps on recliner due to inability to lay flat, denies paroxysmal nocturnal dyspnea.  Pro-BNP 4773 on admission, Prior TTE (on 8/8/24) with LV EF 25-30%, global LV hypokinesis, grade III diastolic dysfunction, moderate pHTN, mild [place], moderate TR   -strict Is and Os, daily weights, low salt diet, 2L fluid restriction  -Continue GDMT with ACEi and beta-blockade  -Some response to aggressive diuresis now on Lasix gtt.  Cardiology input appreciated.  Monitor lytes.     # Hypokalemia - in setting of Lasix gtt.  Supplement K.  Repeat K level.    # LUE DVT - noted on LUE U/S.  Hgb stable.  Start Heparin gtt.  Risks, benefits and alternatives to AC were discussed with wife 1/18.  IN agreement with starting AC.     # Acute blood loss anemia secondary to suspected upper GI bleed Differential includes PUD (given recent NSAID use) Reports increased shortness of breath x 2 months, likely component of SOB due to anemia given severity Admits to two black bowel movements, most recent colonoscopy > 10 years ago and had polyps removed at that time Hgb 6.5 on admission, baseline ~14? but most recent hgb 10.4 (on 11/25/24)>>8.5 s/p 2PRBCs in ED  Hgb has been stable. Gi recommendations appreciated - pt does not appear to be actively bleeding, defers endoscopic evaluation for now unless bleeding recurs.    # HTN: PTA metoprolol succinate 50 mg, ramipril 2.5 mg reordered as lisinopril 10 mg given ramipril not on formulary  # HLD: PTA simvastatin 40 mg reordered as atorvastatin 20 mg given simvastatin not on formulary  # IDDM2 with hyperglycemia: last known A1c 9.4 (on 11/25/24), blood glucose 256 on admission, POC qac and qhs, PTA Lantus 15 U qhs, low dose SSI qac started, blood glucose goal < 180  # CAD (s/p CABG): PTA ASA 81 mg held due to suspected GI bleed  # pAF (on rivaroxaban): Rate control.  AC as above.    # Chronic hypoxic respiratory failure secondary to COPD (baseline on 3-4L NC at home): PTA tiotropium, symbicort reordered as advair, duonebs PRN  # CKD stage IV: BUN 61 and Cr 2.71 on admission, baseline Cr appears to fluctuate but Cr 2.92 (on 11/25/24), avoid nephrotoxins, renally dose meds, encourage PO hydration, pending official nephrology recs  # BPH: PTA tamsulosin 0.4 mg  # Inpatient DVT Prophylaxis - on AC

## 2025-01-20 LAB
ANION GAP SERPL CALC-SCNC: 5 MMOL/L — SIGNIFICANT CHANGE UP (ref 5–17)
APTT BLD: 99.1 SEC — HIGH (ref 24.5–35.6)
BLD GP AB SCN SERPL QL: SIGNIFICANT CHANGE UP
BUN SERPL-MCNC: 65 MG/DL — HIGH (ref 7–23)
CALCIUM SERPL-MCNC: 8.7 MG/DL — SIGNIFICANT CHANGE UP (ref 8.5–10.1)
CHLORIDE SERPL-SCNC: 91 MMOL/L — LOW (ref 96–108)
CO2 SERPL-SCNC: 42 MMOL/L — HIGH (ref 22–31)
CREAT SERPL-MCNC: 2.7 MG/DL — HIGH (ref 0.5–1.3)
EGFR: 23 ML/MIN/1.73M2 — LOW
GLUCOSE BLDC GLUCOMTR-MCNC: 152 MG/DL — HIGH (ref 70–99)
GLUCOSE BLDC GLUCOMTR-MCNC: 154 MG/DL — HIGH (ref 70–99)
GLUCOSE BLDC GLUCOMTR-MCNC: 161 MG/DL — HIGH (ref 70–99)
GLUCOSE BLDC GLUCOMTR-MCNC: 178 MG/DL — HIGH (ref 70–99)
GLUCOSE BLDC GLUCOMTR-MCNC: 184 MG/DL — HIGH (ref 70–99)
GLUCOSE SERPL-MCNC: 156 MG/DL — HIGH (ref 70–99)
HCT VFR BLD CALC: 28.2 % — LOW (ref 39–50)
HCT VFR BLD CALC: 28.9 % — LOW (ref 39–50)
HGB BLD-MCNC: 7.9 G/DL — LOW (ref 13–17)
HGB BLD-MCNC: 8.2 G/DL — LOW (ref 13–17)
MCHC RBC-ENTMCNC: 26.2 PG — LOW (ref 27–34)
MCHC RBC-ENTMCNC: 28 G/DL — LOW (ref 32–36)
MCV RBC AUTO: 93.7 FL — SIGNIFICANT CHANGE UP (ref 80–100)
NRBC # BLD: 0 /100 WBCS — SIGNIFICANT CHANGE UP (ref 0–0)
NRBC BLD-RTO: 0 /100 WBCS — SIGNIFICANT CHANGE UP (ref 0–0)
PLATELET # BLD AUTO: 119 K/UL — LOW (ref 150–400)
POTASSIUM SERPL-MCNC: 3.4 MMOL/L — LOW (ref 3.5–5.3)
POTASSIUM SERPL-SCNC: 3.4 MMOL/L — LOW (ref 3.5–5.3)
RBC # BLD: 3.01 M/UL — LOW (ref 4.2–5.8)
RBC # FLD: 25.9 % — HIGH (ref 10.3–14.5)
SODIUM SERPL-SCNC: 138 MMOL/L — SIGNIFICANT CHANGE UP (ref 135–145)
WBC # BLD: 10.8 K/UL — HIGH (ref 3.8–10.5)
WBC # FLD AUTO: 10.8 K/UL — HIGH (ref 3.8–10.5)

## 2025-01-20 PROCEDURE — 99233 SBSQ HOSP IP/OBS HIGH 50: CPT

## 2025-01-20 RX ORDER — BUMETANIDE 2 MG/1
2 TABLET ORAL EVERY 12 HOURS
Refills: 0 | Status: DISCONTINUED | OUTPATIENT
Start: 2025-01-20 | End: 2025-01-20

## 2025-01-20 RX ORDER — POTASSIUM CHLORIDE 750 MG/1
40 TABLET, EXTENDED RELEASE ORAL EVERY 4 HOURS
Refills: 0 | Status: COMPLETED | OUTPATIENT
Start: 2025-01-20 | End: 2025-01-21

## 2025-01-20 RX ADMIN — Medication 1: at 11:57

## 2025-01-20 RX ADMIN — Medication 1000 UNIT(S)/HR: at 05:17

## 2025-01-20 RX ADMIN — PANTOPRAZOLE 40 MILLIGRAM(S): 20 TABLET, DELAYED RELEASE ORAL at 17:11

## 2025-01-20 RX ADMIN — Medication 15 MG/HR: at 10:50

## 2025-01-20 RX ADMIN — Medication UNIT(S)/HR: at 19:31

## 2025-01-20 RX ADMIN — Medication 1: at 08:37

## 2025-01-20 RX ADMIN — TAMSULOSIN HYDROCHLORIDE 0.4 MILLIGRAM(S): 0.4 CAPSULE ORAL at 21:18

## 2025-01-20 RX ADMIN — INSULIN GLARGINE-YFGN 28 UNIT(S): 100 INJECTION, SOLUTION SUBCUTANEOUS at 21:26

## 2025-01-20 RX ADMIN — ATORVASTATIN CALCIUM 20 MILLIGRAM(S): 80 TABLET, FILM COATED ORAL at 21:17

## 2025-01-20 RX ADMIN — POTASSIUM CHLORIDE 40 MILLIEQUIVALENT(S): 750 TABLET, EXTENDED RELEASE ORAL at 21:17

## 2025-01-20 RX ADMIN — TIOTROPIUM BROMIDE MONOHYDRATE 2 PUFF(S): 18 CAPSULE ORAL; RESPIRATORY (INHALATION) at 13:40

## 2025-01-20 RX ADMIN — Medication 80 MILLIGRAM(S): at 17:10

## 2025-01-20 RX ADMIN — FLUTICASONE PROPIONATE AND SALMETEROL 1 DOSE(S): 113; 14 POWDER, METERED RESPIRATORY (INHALATION) at 08:38

## 2025-01-20 RX ADMIN — FLUTICASONE PROPIONATE AND SALMETEROL 1 DOSE(S): 113; 14 POWDER, METERED RESPIRATORY (INHALATION) at 17:11

## 2025-01-20 RX ADMIN — ONDANSETRON 4 MILLIGRAM(S): 4 TABLET, ORALLY DISINTEGRATING ORAL at 09:44

## 2025-01-20 RX ADMIN — Medication UNIT(S)/HR: at 07:56

## 2025-01-20 RX ADMIN — Medication UNIT(S)/HR: at 07:54

## 2025-01-20 RX ADMIN — Medication 1: at 17:10

## 2025-01-20 RX ADMIN — POTASSIUM CHLORIDE 40 MILLIEQUIVALENT(S): 750 TABLET, EXTENDED RELEASE ORAL at 17:09

## 2025-01-20 RX ADMIN — Medication 25 MILLIGRAM(S): at 10:52

## 2025-01-20 RX ADMIN — PANTOPRAZOLE 40 MILLIGRAM(S): 20 TABLET, DELAYED RELEASE ORAL at 05:13

## 2025-01-20 NOTE — PROGRESS NOTE ADULT - SUBJECTIVE AND OBJECTIVE BOX
Cardiology Progress Note    Interval Events:  No significant events      MEDICATIONS:  buMETAnide 2 milliGRAM(s) Oral every 12 hours  heparin   Injectable 9000 Unit(s) IV Push every 6 hours PRN  heparin   Injectable 4500 Unit(s) IV Push every 6 hours PRN  heparin  Infusion.  Unit(s)/Hr IV Continuous <Continuous>  metolazone 5 milliGRAM(s) Oral daily  metoprolol succinate ER 25 milliGRAM(s) Oral daily  albuterol/ipratropium for Nebulization.. 3 milliLiter(s) Inhalation every 6 hours PRN  fluticasone propionate/ salmeterol 250-50 MICROgram(s) Diskus 1 Dose(s) Inhalation two times a day  tiotropium 2.5 MICROgram(s) Inhaler 2 Puff(s) Inhalation daily  acetaminophen     Tablet .. 650 milliGRAM(s) Oral every 6 hours PRN  melatonin 3 milliGRAM(s) Oral at bedtime PRN  ondansetron Injectable 4 milliGRAM(s) IV Push every 8 hours PRN  aluminum hydroxide/magnesium hydroxide/simethicone Suspension 30 milliLiter(s) Oral every 4 hours PRN  pantoprazole  Injectable 40 milliGRAM(s) IV Push two times a day  atorvastatin 20 milliGRAM(s) Oral at bedtime  dextrose 50% Injectable 25 Gram(s) IV Push once  dextrose 50% Injectable 12.5 Gram(s) IV Push once  dextrose 50% Injectable 25 Gram(s) IV Push once  dextrose Oral Gel 15 Gram(s) Oral once PRN  glucagon  Injectable 1 milliGRAM(s) IntraMuscular once  insulin glargine Injectable (LANTUS) 28 Unit(s) SubCutaneous at bedtime  insulin lispro (ADMELOG) corrective regimen sliding scale   SubCutaneous three times a day before meals    dextrose 5%. 1000 milliLiter(s) IV Continuous <Continuous>  dextrose 5%. 1000 milliLiter(s) IV Continuous <Continuous>  influenza  Vaccine (HIGH DOSE) 0.5 milliLiter(s) IntraMuscular once  tamsulosin 0.4 milliGRAM(s) Oral at bedtime      PHYSICAL EXAM:  T(C): 36.7 (01-20-25 @ 10:34), Max: 37.1 (01-19-25 @ 23:46)  HR: 70 (01-20-25 @ 10:34) (69 - 72)  BP: 103/65 (01-20-25 @ 10:34) (103/65 - 121/78)  RR: 17 (01-20-25 @ 10:34) (17 - 18)  SpO2: 99% (01-20-25 @ 10:34) (94% - 100%)  Wt(kg): --  I&O's Summary    19 Jan 2025 07:01  -  20 Jan 2025 07:00  --------------------------------------------------------  IN: 0 mL / OUT: 1170 mL / NET: -1170 mL      Appearance: No acute distress  HEENT:  mmm  Cardiovascular: Normal S1 S2, +elevated JVP, no murmurs, 1+ LE edema  Respiratory: bibasilar crackles  Psychiatry: Mood & affect appropriate  Gastrointestinal:  soft distended  Skin:  no cyanosis    LABS:	 	  CBC Full  -  ( 20 Jan 2025 05:00 )  WBC Count : 10.80 K/uL  Hemoglobin : 7.9 g/dL  Hematocrit : 28.2 %  Platelet Count - Automated : 119 K/uL    01-20  138  |  91[L]  |  65[H]  ----------------------------<  156[H]  3.4[L]   |  42[H]  |  2.70[H]    01-19  x   |  x   |  x   ----------------------------<  x   4.0   |  x   |  x     Ca    8.7      20 Jan 2025 05:00  Ca    9.1      19 Jan 2025 06:20    TELEMETRY: 	  , PVC's

## 2025-01-20 NOTE — PROGRESS NOTE ADULT - ASSESSMENT
Arun Solares is an 79 year old male with PMHx of HTN, HLD, IDDM2, CAD (s/p CABG), pAF (on rivaroxaban), chronic combined HFpEF and HFrEF (LV EF 25-30% and grade III diastolic dysfunction on echo 8/9/24, s/p AICD placement), hx of severe aortic stenosis (s/p TAVR), chronic hypoxic respiratory failure secondary to COPD (baseline on 3-4L NC at home), CKD stage IV, and BPH who presented to the ED on 1/10/25 for complaints of shortness of breath and admitted for acute on chronic combined HFpEF and HFrEF exacerbation and acute blood loss anemia secondary to suspected upper GI bleed.    # Acute on chronic combined HFpEF and HFrEF exacerbation  -Likely due to non compliance with medications particularly diuretics as per wife.  Sleeps on recliner due to inability to lay flat, denies paroxysmal nocturnal dyspnea.  Pro-BNP 4773 on admission, Prior TTE (on 8/8/24) with LV EF 25-30%, global LV hypokinesis, grade III diastolic dysfunction, moderate pHTN, mild NV, moderate TR   -strict Is and Os, daily weights, low salt diet, 2L fluid restriction  -Continue GDMT with ACEi and beta-blockade  -Some response to aggressive diuresis on Lasix gtt.  Change to IV  Cardiology following.      # Hypokalemia - in setting of Lasix gtt.  Supplement K.  Repeat K level stable.     # LUE DVT - noted on LUE U/S.  Hgb stable.  Start Heparin gtt.  Risks, benefits and alternatives to AC were discussed with wife 1/18.  IN agreement with starting AC.     # Acute blood loss anemia secondary to suspected upper GI bleed Differential includes PUD (given recent NSAID use) Reports increased shortness of breath x 2 months, likely component of SOB due to anemia given severity Admits to two black bowel movements, most recent colonoscopy > 10 years ago and had polyps removed at that time Hgb 6.5 on admission, baseline ~14? but most recent hgb 10.4 (on 11/25/24)>>8.5 s/p 2PRBCs in ED  Hgb has been stable. Gi recommendations appreciated - pt does not appear to be actively bleeding, defers endoscopic evaluation for now unless bleeding recurs.    Drop in H/H noted.  Repeat H/H stable.     # HTN: PTA metoprolol succinate 50 mg, ramipril 2.5 mg reordered as lisinopril 10 mg given ramipril not on formulary  # HLD: PTA simvastatin 40 mg reordered as atorvastatin 20 mg given simvastatin not on formulary  # IDDM2 with hyperglycemia: last known A1c 9.4 (on 11/25/24), blood glucose 256 on admission, POC qac and qhs, PTA Lantus 15 U qhs, low dose SSI qac started, blood glucose goal < 180  # CAD (s/p CABG): PTA ASA 81 mg held due to suspected GI bleed  # pAF (on rivaroxaban): Rate control.  AC as above.    # Chronic hypoxic respiratory failure secondary to COPD (baseline on 3-4L NC at home): PTA tiotropium, symbicort reordered as advair, duonebs PRN  # CKD stage IV: BUN 61 and Cr 2.71 on admission, baseline Cr appears to fluctuate but Cr 2.92 (on 11/25/24), avoid nephrotoxins, renally dose meds, encourage PO hydration, pending official nephrology recs  # BPH: PTA tamsulosin 0.4 mg  # Inpatient DVT Prophylaxis - on AC  Plan of care d/w wife at bedside.   Time spent by me managing the patient including but not limited to reviewing the chart, discussion with consultants, the IDR team (nurse//care manager/ACP), physical exam and assessment and plan is 50 mins

## 2025-01-20 NOTE — PROGRESS NOTE ADULT - SUBJECTIVE AND OBJECTIVE BOX
[facility] NEPHROLOGY SERVICES, [facility]  NEPHROLOGY AND HYPERTENSION  [address]  [address]  [place] [address]  [phone]    MD DANIEL GARRETT MD YELENA ROSENBERG, MD BINNY KOSHY, MD CHRISTOPHER CAPUTO, MD EDWARD BOVER, MD          Patient events noted    MEDICATIONS  (STANDING):  atorvastatin 20 milliGRAM(s) Oral at bedtime  dextrose 5%. 1000 milliLiter(s) (100 mL/Hr) IV Continuous <Continuous>  dextrose 5%. 1000 milliLiter(s) (50 mL/Hr) IV Continuous <Continuous>  dextrose 50% Injectable 25 Gram(s) IV Push once  dextrose 50% Injectable 12.5 Gram(s) IV Push once  dextrose 50% Injectable 25 Gram(s) IV Push once  fluticasone propionate/ salmeterol 250-50 MICROgram(s) Diskus 1 Dose(s) Inhalation two times a day  furosemide Infusion 30 mG/Hr (15 mL/Hr) IV Continuous <Continuous>  glucagon  Injectable 1 milliGRAM(s) IntraMuscular once  heparin  Infusion.  Unit(s)/Hr (20 mL/Hr) IV Continuous <Continuous>  influenza  Vaccine (HIGH DOSE) 0.5 milliLiter(s) IntraMuscular once  insulin glargine Injectable (LANTUS) 28 Unit(s) SubCutaneous at bedtime  insulin lispro (ADMELOG) corrective regimen sliding scale   SubCutaneous three times a day before meals  metolazone 5 milliGRAM(s) Oral daily  metoprolol succinate ER 25 milliGRAM(s) Oral daily  pantoprazole  Injectable 40 milliGRAM(s) IV Push two times a day  tamsulosin 0.4 milliGRAM(s) Oral at bedtime  tiotropium 2.5 MICROgram(s) Inhaler 2 Puff(s) Inhalation daily    MEDICATIONS  (PRN):  acetaminophen     Tablet .. 650 milliGRAM(s) Oral every 6 hours PRN Temp greater or equal to 38C (100.4F), Mild Pain (1 - 3)  albuterol/ipratropium for Nebulization.. 3 milliLiter(s) Inhalation every 6 hours PRN -for shortness of breath and/or wheezing  aluminum hydroxide/magnesium hydroxide/simethicone Suspension 30 milliLiter(s) Oral every 4 hours PRN Dyspepsia  dextrose Oral Gel 15 Gram(s) Oral once PRN Blood Glucose LESS THAN 70 milliGRAM(s)/deciliter  heparin   Injectable 9000 Unit(s) IV Push every 6 hours PRN For aPTT less than 40  heparin   Injectable 4500 Unit(s) IV Push every 6 hours PRN For aPTT between 40 - 57  melatonin 3 milliGRAM(s) Oral at bedtime PRN Insomnia  ondansetron Injectable 4 milliGRAM(s) IV Push every 8 hours PRN Nausea and/or Vomiting      25 @ 07:01  -  25 @ 07:00  --------------------------------------------------------  IN: 0 mL / OUT: 1270 mL / NET: -1270 mL    25 @ 07:01  -  25 @ 00:24  --------------------------------------------------------  IN: 0 mL / OUT: 470 mL / NET: -470 mL      PHYSICAL EXAM:      T(C): 37.1 (25 @ 23:46), Max: 37.1 (25 @ 23:46)  HR: 70 (25 @ 23:46) (67 - 76)  BP: 121/78 (25 @ 23:46) (109/64 - 121/78)  RR: 18 (25 @ 23:46) (18 - 18)  SpO2: 94% (25 @ 23:46) (94% - 100%)  Wt(kg): --  Lungs clear  Heart S1S2  Abd soft NT ND  Extremities:   2-3 edema      Daily Weight Trend:   Weight in k.9 (25 @ 10:27)  Weight in k.4 (25 @ 04:53)  Weight in k.8 (25 @ 04:49)  Weight in k.6 (25 @ 04:48)                                8.6    12.17 )-----------( 131      ( 2025 06:20 )             30.1     01-19    x   |  x   |  x   ----------------------------<  x   4.0   |  x   |  x     Ca    9.1      2025 06:20  Phos  3.9     01-18  Mg     2.0     -18          Creatinine Trend: 2.35<--, 2.28<--, 2.47<--, 2.57<--, 2.10<--, 1.93<--      Assessment  DENISHA CKD 3; fluid overload, hx pulm HTN  Hx COPD  Suspected mediation non adherence   LUE DVT    Plan   Increased lasix drip 30 mg/hr  Transition to PO tomorrow  I/O, weights      Jon Rowe MD

## 2025-01-20 NOTE — PROGRESS NOTE ADULT - SUBJECTIVE AND OBJECTIVE BOX
Patient: JO VARELA [id number] 79y Male                            Hospitalist Attending Note    On Lasix gtt  Wife at bedside  Denies SOB.  Still edematous.   No chest pain / palpitations.  No additional complaints.     No bleeding    ____________________PHYSICAL EXAM:  GENERAL:  NAD Alert and Oriented x 3   HEENT: NCAT  CARDIOVASCULAR:  S1, S2  LUNGS: bibasilar crackles.   ABDOMEN:  soft, (-) tenderness, (-) distension, (+) bowel sounds, (-) guarding, (-) rebound (-) rigidity  EXTREMITIES:  no cyanosis / clubbing.  3+ edema.   ____________________      VITALS:  Vital Signs Last 24 Hrs  T(C): 36.7 (2025 10:34), Max: 37.1 (2025 23:46)  T(F): 98.1 (2025 10:34), Max: 98.8 (2025 23:46)  HR: 70 (2025 10:34) (70 - 70)  BP: 103/65 (2025 10:34) (103/65 - 121/78)  BP(mean): --  RR: 17 (2025 10:34) (17 - 18)  SpO2: 99% (2025 10:34) (94% - 99%)     Daily     Daily Weight in k.9 (2025 04:51)  CAPILLARY BLOOD GLUCOSE      POCT Blood Glucose.: 184 mg/dL (2025 10:59)  POCT Blood Glucose.: 161 mg/dL (2025 07:55)  POCT Blood Glucose.: 152 mg/dL (2025 05:28)  POCT Blood Glucose.: 224 mg/dL (2025 22:31)  POCT Blood Glucose.: 250 mg/dL (2025 20:57)  POCT Blood Glucose.: 248 mg/dL (2025 16:51)    I&O's Summary    2025 07:01  -  2025 07:00  --------------------------------------------------------  IN: 0 mL / OUT: 1170 mL / NET: -1170 mL        LABS:                        8.2    x     )-----------( x        ( 2025 15:45 )             28.9     -    138  |  91[L]  |  65[H]  ----------------------------<  156[H]  3.4[L]   |  42[H]  |  2.70[H]    Ca    8.7      2025 05:00      PTT - ( 2025 05:00 )  PTT:99.1 sec    Urinalysis Basic - ( 2025 05:00 )    Color: x / Appearance: x / SG: x / pH: x  Gluc: 156 mg/dL / Ketone: x  / Bili: x / Urobili: x   Blood: x / Protein: x / Nitrite: x   Leuk Esterase: x / RBC: x / WBC x   Sq Epi: x / Non Sq Epi: x / Bacteria: x              MEDICATIONS:  acetaminophen     Tablet .. 650 milliGRAM(s) Oral every 6 hours PRN  albuterol/ipratropium for Nebulization.. 3 milliLiter(s) Inhalation every 6 hours PRN  aluminum hydroxide/magnesium hydroxide/simethicone Suspension 30 milliLiter(s) Oral every 4 hours PRN  atorvastatin 20 milliGRAM(s) Oral at bedtime  dextrose 5%. 1000 milliLiter(s) IV Continuous <Continuous>  dextrose 5%. 1000 milliLiter(s) IV Continuous <Continuous>  dextrose 50% Injectable 25 Gram(s) IV Push once  dextrose 50% Injectable 12.5 Gram(s) IV Push once  dextrose 50% Injectable 25 Gram(s) IV Push once  dextrose Oral Gel 15 Gram(s) Oral once PRN  fluticasone propionate/ salmeterol 250-50 MICROgram(s) Diskus 1 Dose(s) Inhalation two times a day  furosemide   Injectable 80 milliGRAM(s) IV Push every 8 hours  glucagon  Injectable 1 milliGRAM(s) IntraMuscular once  heparin   Injectable 9000 Unit(s) IV Push every 6 hours PRN  heparin   Injectable 4500 Unit(s) IV Push every 6 hours PRN  heparin  Infusion.  Unit(s)/Hr IV Continuous <Continuous>  influenza  Vaccine (HIGH DOSE) 0.5 milliLiter(s) IntraMuscular once  insulin glargine Injectable (LANTUS) 28 Unit(s) SubCutaneous at bedtime  insulin lispro (ADMELOG) corrective regimen sliding scale   SubCutaneous three times a day before meals  metolazone 5 milliGRAM(s) Oral daily  metoprolol succinate ER 25 milliGRAM(s) Oral daily  ondansetron Injectable 4 milliGRAM(s) IV Push every 8 hours PRN  pantoprazole  Injectable 40 milliGRAM(s) IV Push two times a day  tamsulosin 0.4 milliGRAM(s) Oral at bedtime  tiotropium 2.5 MICROgram(s) Inhaler 2 Puff(s) Inhalation daily

## 2025-01-20 NOTE — PROGRESS NOTE ADULT - SUBJECTIVE AND OBJECTIVE BOX
[facility] NEPHROLOGY SERVICES, [facility]  NEPHROLOGY AND HYPERTENSION  [address]  [address]  [place] [address]  [phone]    MD DANIEL GARRETT MD YELENA ROSENBERG, MD BINNY KOSHY, MD CHRISTOPHER CAPUTO, MD EDWARD BOVER, MD          Patient events noted    MEDICATIONS  (STANDING):  atorvastatin 20 milliGRAM(s) Oral at bedtime  dextrose 5%. 1000 milliLiter(s) (100 mL/Hr) IV Continuous <Continuous>  dextrose 5%. 1000 milliLiter(s) (50 mL/Hr) IV Continuous <Continuous>  dextrose 50% Injectable 25 Gram(s) IV Push once  dextrose 50% Injectable 12.5 Gram(s) IV Push once  dextrose 50% Injectable 25 Gram(s) IV Push once  fluticasone propionate/ salmeterol 250-50 MICROgram(s) Diskus 1 Dose(s) Inhalation two times a day  furosemide   Injectable 80 milliGRAM(s) IV Push every 8 hours  glucagon  Injectable 1 milliGRAM(s) IntraMuscular once  heparin  Infusion.  Unit(s)/Hr (20 mL/Hr) IV Continuous <Continuous>  influenza  Vaccine (HIGH DOSE) 0.5 milliLiter(s) IntraMuscular once  insulin glargine Injectable (LANTUS) 28 Unit(s) SubCutaneous at bedtime  insulin lispro (ADMELOG) corrective regimen sliding scale   SubCutaneous three times a day before meals  metolazone 5 milliGRAM(s) Oral daily  metoprolol succinate ER 25 milliGRAM(s) Oral daily  pantoprazole  Injectable 40 milliGRAM(s) IV Push two times a day  potassium chloride    Tablet ER 40 milliEquivalent(s) Oral every 4 hours  tamsulosin 0.4 milliGRAM(s) Oral at bedtime  tiotropium 2.5 MICROgram(s) Inhaler 2 Puff(s) Inhalation daily    MEDICATIONS  (PRN):  acetaminophen     Tablet .. 650 milliGRAM(s) Oral every 6 hours PRN Temp greater or equal to 38C (100.4F), Mild Pain (1 - 3)  albuterol/ipratropium for Nebulization.. 3 milliLiter(s) Inhalation every 6 hours PRN -for shortness of breath and/or wheezing  aluminum hydroxide/magnesium hydroxide/simethicone Suspension 30 milliLiter(s) Oral every 4 hours PRN Dyspepsia  dextrose Oral Gel 15 Gram(s) Oral once PRN Blood Glucose LESS THAN 70 milliGRAM(s)/deciliter  heparin   Injectable 9000 Unit(s) IV Push every 6 hours PRN For aPTT less than 40  heparin   Injectable 4500 Unit(s) IV Push every 6 hours PRN For aPTT between 40 - 57  ondansetron Injectable 4 milliGRAM(s) IV Push every 8 hours PRN Nausea and/or Vomiting      01-19-25 @ 07:01  -  01-20-25 @ 07:00  --------------------------------------------------------  IN: 0 mL / OUT: 1170 mL / NET: -1170 mL    01-20-25 @ 07:01  -  01-20-25 @ 22:51  --------------------------------------------------------  IN: 0 mL / OUT: 700 mL / NET: -700 mL      PHYSICAL EXAM:      T(C): 36.7 (01-20-25 @ 16:34), Max: 37.1 (01-19-25 @ 23:46)  HR: 69 (01-20-25 @ 16:34) (69 - 70)  BP: 105/70 (01-20-25 @ 16:34) (103/65 - 121/78)  RR: 17 (01-20-25 @ 16:34) (17 - 18)  SpO2: 96% (01-20-25 @ 16:34) (94% - 99%)  Wt(kg): --  Lungs clear  Heart S1S2  Abd soft NT ND  Extremities:   tr edema                                    8.2    x     )-----------( x        ( 20 Jan 2025 15:45 )             28.9     01-20    138  |  91[L]  |  65[H]  ----------------------------<  156[H]  3.4[L]   |  42[H]  |  2.70[H]    Ca    8.7      20 Jan 2025 05:00          Creatinine Trend: 2.70<--, 2.35<--, 2.28<--, 2.47<--, 2.57<--, 2.10<--      Assessment  DENISHA CKD 3; fluid overload, hx pulm HTN  Hx COPD  Suspected mediation non adherence   LUE DVT    Plan   Lasix 80mg IV q 8  Transition to PO tomorrow  I/O, weights    Jon Rowe MD

## 2025-01-20 NOTE — PROGRESS NOTE ADULT - ASSESSMENT
79M with CAD/CABG, HFrEF 25%, ICD, afib, copd on home o2, htn hld dm p/w adhf.     ADHF    cont diuresis  cont metolazone  add spironolactone 25mg daily  +Lt UE DVT - on AC

## 2025-01-21 LAB
ANION GAP SERPL CALC-SCNC: 3 MMOL/L — LOW (ref 5–17)
APTT BLD: 57.5 SEC — HIGH (ref 24.5–35.6)
APTT BLD: 69.1 SEC — HIGH (ref 24.5–35.6)
BUN SERPL-MCNC: 61 MG/DL — HIGH (ref 7–23)
CALCIUM SERPL-MCNC: 9.3 MG/DL — SIGNIFICANT CHANGE UP (ref 8.5–10.1)
CHLORIDE SERPL-SCNC: 92 MMOL/L — LOW (ref 96–108)
CO2 SERPL-SCNC: 43 MMOL/L — HIGH (ref 22–31)
CREAT SERPL-MCNC: 2.49 MG/DL — HIGH (ref 0.5–1.3)
EGFR: 25 ML/MIN/1.73M2 — LOW
GLUCOSE BLDC GLUCOMTR-MCNC: 130 MG/DL — HIGH (ref 70–99)
GLUCOSE BLDC GLUCOMTR-MCNC: 177 MG/DL — HIGH (ref 70–99)
GLUCOSE BLDC GLUCOMTR-MCNC: 214 MG/DL — HIGH (ref 70–99)
GLUCOSE BLDC GLUCOMTR-MCNC: 91 MG/DL — SIGNIFICANT CHANGE UP (ref 70–99)
GLUCOSE SERPL-MCNC: 74 MG/DL — SIGNIFICANT CHANGE UP (ref 70–99)
HCT VFR BLD CALC: 31.7 % — LOW (ref 39–50)
HGB BLD-MCNC: 8.8 G/DL — LOW (ref 13–17)
MCHC RBC-ENTMCNC: 26.2 PG — LOW (ref 27–34)
MCHC RBC-ENTMCNC: 27.8 G/DL — LOW (ref 32–36)
MCV RBC AUTO: 94.3 FL — SIGNIFICANT CHANGE UP (ref 80–100)
NRBC # BLD: 0 /100 WBCS — SIGNIFICANT CHANGE UP (ref 0–0)
NRBC BLD-RTO: 0 /100 WBCS — SIGNIFICANT CHANGE UP (ref 0–0)
PLATELET # BLD AUTO: 127 K/UL — LOW (ref 150–400)
POTASSIUM SERPL-MCNC: 4.1 MMOL/L — SIGNIFICANT CHANGE UP (ref 3.5–5.3)
POTASSIUM SERPL-SCNC: 4.1 MMOL/L — SIGNIFICANT CHANGE UP (ref 3.5–5.3)
RBC # BLD: 3.36 M/UL — LOW (ref 4.2–5.8)
RBC # FLD: 27.2 % — HIGH (ref 10.3–14.5)
SODIUM SERPL-SCNC: 138 MMOL/L — SIGNIFICANT CHANGE UP (ref 135–145)
WBC # BLD: 10.23 K/UL — SIGNIFICANT CHANGE UP (ref 3.8–10.5)
WBC # FLD AUTO: 10.23 K/UL — SIGNIFICANT CHANGE UP (ref 3.8–10.5)

## 2025-01-21 PROCEDURE — 99232 SBSQ HOSP IP/OBS MODERATE 35: CPT

## 2025-01-21 PROCEDURE — 99233 SBSQ HOSP IP/OBS HIGH 50: CPT

## 2025-01-21 RX ADMIN — PANTOPRAZOLE 40 MILLIGRAM(S): 20 TABLET, DELAYED RELEASE ORAL at 17:08

## 2025-01-21 RX ADMIN — POTASSIUM CHLORIDE 40 MILLIEQUIVALENT(S): 750 TABLET, EXTENDED RELEASE ORAL at 01:09

## 2025-01-21 RX ADMIN — Medication 1: at 17:08

## 2025-01-21 RX ADMIN — Medication UNIT(S)/HR: at 19:22

## 2025-01-21 RX ADMIN — INSULIN GLARGINE-YFGN 28 UNIT(S): 100 INJECTION, SOLUTION SUBCUTANEOUS at 21:38

## 2025-01-21 RX ADMIN — Medication 80 MILLIGRAM(S): at 08:44

## 2025-01-21 RX ADMIN — Medication 80 MILLIGRAM(S): at 01:09

## 2025-01-21 RX ADMIN — Medication 25 MILLIGRAM(S): at 05:30

## 2025-01-21 RX ADMIN — ATORVASTATIN CALCIUM 20 MILLIGRAM(S): 80 TABLET, FILM COATED ORAL at 21:38

## 2025-01-21 RX ADMIN — TIOTROPIUM BROMIDE MONOHYDRATE 2 PUFF(S): 18 CAPSULE ORAL; RESPIRATORY (INHALATION) at 12:29

## 2025-01-21 RX ADMIN — PANTOPRAZOLE 40 MILLIGRAM(S): 20 TABLET, DELAYED RELEASE ORAL at 05:31

## 2025-01-21 RX ADMIN — IPRATROPIUM BROMIDE AND ALBUTEROL SULFATE 3 MILLILITER(S): .5; 2.5 SOLUTION RESPIRATORY (INHALATION) at 15:31

## 2025-01-21 RX ADMIN — Medication UNIT(S)/HR: at 17:28

## 2025-01-21 RX ADMIN — FLUTICASONE PROPIONATE AND SALMETEROL 1 DOSE(S): 113; 14 POWDER, METERED RESPIRATORY (INHALATION) at 05:30

## 2025-01-21 RX ADMIN — FLUTICASONE PROPIONATE AND SALMETEROL 1 DOSE(S): 113; 14 POWDER, METERED RESPIRATORY (INHALATION) at 17:09

## 2025-01-21 RX ADMIN — Medication UNIT(S)/HR: at 07:33

## 2025-01-21 RX ADMIN — Medication 80 MILLIGRAM(S): at 17:09

## 2025-01-21 RX ADMIN — Medication UNIT(S)/HR: at 09:15

## 2025-01-21 RX ADMIN — TAMSULOSIN HYDROCHLORIDE 0.4 MILLIGRAM(S): 0.4 CAPSULE ORAL at 21:37

## 2025-01-21 NOTE — PROGRESS NOTE ADULT - SUBJECTIVE AND OBJECTIVE BOX
Patient is a 79y old  Male who presents with shortness of breath and acute blood loss anemia     PAST MEDICAL & SURGICAL HISTORY:  HTN (hypertension)    COPD (chronic obstructive pulmonary disease)    HLD (hyperlipidemia)    Insulin dependent type 2 diabetes mellitus    Chronic hypoxic respiratory failure, on home oxygen therapy    Stage 4 chronic kidney disease    Chronic combined systolic and diastolic heart failure    Unspecified atrial fibrillation    S/P CABG x 3    S/P hernia repair  hernia [id number]    S/P implantation of automatic cardioverter/defibrillator (AICD)    INTERVAL HISTORY: in no acute distress, dyspnea much improved, hypervolemia improving    	  MEDICATIONS:  MEDICATIONS  (STANDING):  atorvastatin 20 milliGRAM(s) Oral at bedtime  fluticasone propionate/ salmeterol 250-50 MICROgram(s) Diskus 1 Dose(s) Inhalation two times a day  furosemide   Injectable 80 milliGRAM(s) IV Push every 8 hours  heparin  Infusion.  Unit(s)/Hr (20 mL/Hr) IV Continuous <Continuous>  insulin glargine Injectable (LANTUS) 28 Unit(s) SubCutaneous at bedtime  insulin lispro (ADMELOG) corrective regimen sliding scale   SubCutaneous three times a day before meals  metolazone 5 milliGRAM(s) Oral daily  metoprolol succinate ER 25 milliGRAM(s) Oral daily  pantoprazole  Injectable 40 milliGRAM(s) IV Push two times a day  tamsulosin 0.4 milliGRAM(s) Oral at bedtime  tiotropium 2.5 MICROgram(s) Inhaler 2 Puff(s) Inhalation daily    MEDICATIONS  (PRN):  acetaminophen     Tablet .. 650 milliGRAM(s) Oral every 6 hours PRN Temp greater or equal to 38C (100.4F), Mild Pain (1 - 3)  albuterol/ipratropium for Nebulization.. 3 milliLiter(s) Inhalation every 6 hours PRN -for shortness of breath and/or wheezing  aluminum hydroxide/magnesium hydroxide/simethicone Suspension 30 milliLiter(s) Oral every 4 hours PRN Dyspepsia  dextrose Oral Gel 15 Gram(s) Oral once PRN Blood Glucose LESS THAN 70 milliGRAM(s)/deciliter  heparin   Injectable 9000 Unit(s) IV Push every 6 hours PRN For aPTT less than 40  heparin   Injectable 4500 Unit(s) IV Push every 6 hours PRN For aPTT between 40 - 57  ondansetron Injectable 4 milliGRAM(s) IV Push every 8 hours PRN Nausea and/or Vomiting    Vitals:  T(F): 98 (01-21-25 @ 11:30), Max: 98 (01-20-25 @ 16:34)  HR: 73 (01-21-25 @ 11:30) (66 - 73)  BP: 105/67 (01-21-25 @ 11:30) (101/63 - 120/75)  RR: 18 (01-21-25 @ 11:30) (17 - 18)  SpO2: 97% (01-21-25 @ 11:30) (96% - 98%)    01-20 @ 07:01  -  01-21 @ 07:00  --------------------------------------------------------  IN:  Total IN: 0 mL    OUT:    Voided (mL): 1720 mL  Total OUT: 1720 mL    Total NET: -1720 mL    01-21 @ 07:01  -  01-21 @ 13:43  --------------------------------------------------------  IN:    Oral Fluid: 555 mL  Total IN: 555 mL    OUT:    Incontinent per Condom Catheter (mL): 1590 mL  Total OUT: 1590 mL    Total NET: -1035 mL    PHYSICAL EXAM:  Neuro: Awake, responsive  CV: S1 S2 RRR  Lungs: diminished to bases   GI: Softly distended, BS +, NT  Extremities: LE edema    TELEMETRY: paced, NSVTs, PVCs    RADIOLOGY: < from: US Duplex Venous Upper Ext Ltd, Left (01.11.25 @ 02:59) >  Positive for thrombus within the left brachial and basilic vein.    < end of copied text >  < from: Xray Chest 1 View-PORTABLE IMMEDIATE (Xray Chest 1 View-PORTABLE IMMEDIATE .) (01.10.25 @ 17:13) >    IMPRESSION: Stable cardiac findings. Fluid is a left base again seen.   Overall chest has improved from prior.    < end of copied text >    DIAGNOSTIC TESTING:    [x ] Echocardiogram: < from: TTE Echo Complete w/ Contrast w/ Doppler (08.08.24 @ 13:27) >   1. Left ventricular cavity is normal in size. Left ventricular systolic   function is severely decreased with an ejection fraction visually   estimated at 25 to 30 %. Global left ventricular hypokinesis.   2. There is severe (grade 3) left ventricular diastolic dysfunction,   with elevated left ventricular filling pressure.   3. The right ventricle is not well visualized.   4. Device lead is visualized in the right heart.   5. No significant valvular disease.   6. No pericardial effusion seen.   7. Estimated pulmonary artery systolic pressure is 53 mmHg, consistent   with moderate pulmonary hypertension.   8. Left and moderate left pleural effusion noted.   9. Mild pulmonic regurgitation.  10. Moderate tricuspid regurgitation.    < end of copied text >  < from: NM Pharm Stress Test, Dual Isotope (03.17.21 @ 12:40) >  1. There was scintigraphic evidence for a myocardial infarct in the RCA territory with no significant ischemia.    2. There is severely abnormal left ventricular contractility, globally diminished calculated ejection fraction and no wall motion abnormlaities. Overall post stress ejection fraction was 21%    < end of copied text >    LABS:	 	    21 Jan 2025 07:30    138    |  92     |  61     ----------------------------<  74     4.1     |  43     |  2.49   20 Jan 2025 05:00    138    |  91     |  65     ----------------------------<  156    3.4     |  42     |  2.70   19 Jan 2025 17:15    x      |  x      |  x      ----------------------------<  x      4.0     |  x      |  x        Ca    9.3        21 Jan 2025 07:30                       8.8    10.23 )-----------( 127      ( 21 Jan 2025 07:30 )             31.7 ,                       8.2    x     )-----------( x        ( 20 Jan 2025 15:45 )             28.9 ,                       7.9    10.80 )-----------( 119      ( 20 Jan 2025 05:00 )             28.2 ,                       8.6    12.17 )-----------( 131      ( 19 Jan 2025 06:20 )             30.1 ,                       8.3    11.39 )-----------( 122      ( 18 Jan 2025 18:00 )             28.6   pro-BNP: Pro-Brain Natriuretic Peptide: 4773 pg/mL (01.10.25 @ 16:20)

## 2025-01-21 NOTE — PROGRESS NOTE ADULT - ASSESSMENT
Arun Solares is an 79 year old male with PMHx of HTN, HLD, IDDM2, CAD (s/p CABG), pAF (on rivaroxaban), chronic combined HFpEF and HFrEF (LV EF 25-30% and grade III diastolic dysfunction on echo 8/9/24, s/p AICD placement), hx of severe aortic stenosis (s/p TAVR), chronic hypoxic respiratory failure secondary to COPD (baseline on 3-4L NC at home), CKD stage IV, and BPH who presented to the ED on 1/10/25 for complaints of shortness of breath and admitted for acute on chronic combined HFpEF and HFrEF exacerbation and acute blood loss anemia secondary to suspected upper GI bleed.    # Acute on chronic combined HFpEF and HFrEF exacerbation  -Likely due to non compliance with medications particularly diuretics as per wife.  Sleeps on recliner due to inability to lay flat, denies paroxysmal nocturnal dyspnea.  Pro-BNP 4773 on admission, Prior TTE (on 8/8/24) with LV EF 25-30%, global LV hypokinesis, grade III diastolic dysfunction, moderate pHTN, mild IL, moderate TR   -strict Is and Os, daily weights, low salt diet, 2L fluid restriction  -Continue GDMT with ACEi and beta-blockade  -Some response to aggressive diuresis on Lasix gtt.  Now on IV  Cardiology following.      # Hypokalemia - in setting of Lasix gtt.  Supplement K.  Repeat K level stable.     # LUE DVT - noted on LUE U/S.  Hgb stable.  Start Heparin gtt.  Risks, benefits and alternatives to AC were discussed with wife 1/18.  IN agreement with starting AC.  Consider transition to DOAC if H/H remains stable.     # Acute blood loss anemia secondary to suspected upper GI bleed Differential includes PUD (given recent NSAID use) Reports increased shortness of breath x 2 months, likely component of SOB due to anemia given severity Admits to two black bowel movements, most recent colonoscopy > 10 years ago and had polyps removed at that time Hgb 6.5 on admission, baseline ~14? but most recent hgb 10.4 (on 11/25/24)>>8.5 s/p 2PRBCs in ED.  H/H stable.   Hgb has been stable. Gi recommendations appreciated - pt does not appear to be actively bleeding, defers endoscopic evaluation for now unless bleeding recurs.    Drop in H/H noted.  Repeat H/H stable.     # HTN: PTA metoprolol succinate 50 mg, ramipril 2.5 mg reordered as lisinopril 10 mg given ramipril not on formulary  # HLD: PTA simvastatin 40 mg reordered as atorvastatin 20 mg given simvastatin not on formulary  # IDDM2 with hyperglycemia: last known A1c 9.4 (on 11/25/24), blood glucose 256 on admission, POC qac and qhs, PTA Lantus 15 U qhs, low dose SSI qac started, blood glucose goal < 180  # CAD (s/p CABG): PTA ASA 81 mg held due to suspected GI bleed  # pAF (on rivaroxaban): Rate control.  AC as above.    # Chronic hypoxic respiratory failure secondary to COPD (baseline on 3-4L NC at home): PTA tiotropium, symbicort reordered as advair, duonebs PRN  # CKD stage IV: BUN 61 and Cr 2.71 on admission, baseline Cr appears to fluctuate but Cr 2.92 (on 11/25/24), avoid nephrotoxins, renally dose meds, encourage PO hydration, pending official nephrology recs  # BPH: PTA tamsulosin 0.4 mg  # Inpatient DVT Prophylaxis - on AC  Plan of care d/w wife at bedside.   Time spent by me managing the patient including but not limited to reviewing the chart, discussion with consultants, the IDR team (nurse//care manager/ACP), physical exam and assessment and plan is 50 mins

## 2025-01-21 NOTE — PROGRESS NOTE ADULT - SUBJECTIVE AND OBJECTIVE BOX
Patient: JO VARELA [id number] 79y Male                            Hospitalist Attending Note    On Lasix IV  Wife at bedside  Denies SOB.  Still edematous.   No chest pain / palpitations.  No additional complaints.     No bleeding    ____________________PHYSICAL EXAM:  GENERAL:  NAD Alert and Oriented x 3   HEENT: NCAT  CARDIOVASCULAR:  S1, S2  LUNGS: bibasilar crackles.   ABDOMEN:  soft, (-) tenderness, (-) distension, (+) bowel sounds, (-) guarding, (-) rebound (-) rigidity  EXTREMITIES:  no cyanosis / clubbing.  3+ edema.   ____________________      VITALS:  Vital Signs Last 24 Hrs  T(C): 36.6 (2025 16:11), Max: 36.7 (2025 11:30)  T(F): 97.9 (2025 16:11), Max: 98 (2025 11:30)  HR: 71 (2025 16:11) (66 - 75)  BP: 129/70 (2025 16:11) (101/63 - 129/70)  BP(mean): --  RR: 18 (2025 16:11) (18 - 18)  SpO2: 96% (2025 16:11) (96% - 98%)    Parameters below as of 2025 16:11  Patient On (Oxygen Delivery Method): nasal cannula  O2 Flow (L/min): 2   Daily     Daily Weight in k.4 (2025 04:37)  CAPILLARY BLOOD GLUCOSE      POCT Blood Glucose.: 177 mg/dL (2025 16:42)  POCT Blood Glucose.: 130 mg/dL (2025 11:35)  POCT Blood Glucose.: 91 mg/dL (2025 07:37)  POCT Blood Glucose.: 154 mg/dL (2025 21:12)    I&O's Summary    2025 07:01  -  2025 07:00  --------------------------------------------------------  IN: 0 mL / OUT: 1720 mL / NET: -1720 mL    2025 07:01  -  2025 19:50  --------------------------------------------------------  IN: 555 mL / OUT: 1590 mL / NET: -1035 mL        LABS:                        8.8    10.23 )-----------( 127      ( 2025 07:30 )             31.7         138  |  92[L]  |  61[H]  ----------------------------<  74  4.1   |  43[H]  |  2.49[H]    Ca    9.3      2025 07:30      PTT - ( 2025 16:40 )  PTT:69.1 sec    Urinalysis Basic - ( 2025 07:30 )    Color: x / Appearance: x / SG: x / pH: x  Gluc: 74 mg/dL / Ketone: x  / Bili: x / Urobili: x   Blood: x / Protein: x / Nitrite: x   Leuk Esterase: x / RBC: x / WBC x   Sq Epi: x / Non Sq Epi: x / Bacteria: x              MEDICATIONS:  acetaminophen     Tablet .. 650 milliGRAM(s) Oral every 6 hours PRN  albuterol/ipratropium for Nebulization.. 3 milliLiter(s) Inhalation every 6 hours PRN  aluminum hydroxide/magnesium hydroxide/simethicone Suspension 30 milliLiter(s) Oral every 4 hours PRN  atorvastatin 20 milliGRAM(s) Oral at bedtime  dextrose 5%. 1000 milliLiter(s) IV Continuous <Continuous>  dextrose 5%. 1000 milliLiter(s) IV Continuous <Continuous>  dextrose 50% Injectable 25 Gram(s) IV Push once  dextrose 50% Injectable 12.5 Gram(s) IV Push once  dextrose 50% Injectable 25 Gram(s) IV Push once  dextrose Oral Gel 15 Gram(s) Oral once PRN  fluticasone propionate/ salmeterol 250-50 MICROgram(s) Diskus 1 Dose(s) Inhalation two times a day  furosemide   Injectable 80 milliGRAM(s) IV Push every 8 hours  glucagon  Injectable 1 milliGRAM(s) IntraMuscular once  heparin   Injectable 9000 Unit(s) IV Push every 6 hours PRN  heparin   Injectable 4500 Unit(s) IV Push every 6 hours PRN  heparin  Infusion.  Unit(s)/Hr IV Continuous <Continuous>  influenza  Vaccine (HIGH DOSE) 0.5 milliLiter(s) IntraMuscular once  insulin glargine Injectable (LANTUS) 28 Unit(s) SubCutaneous at bedtime  insulin lispro (ADMELOG) corrective regimen sliding scale   SubCutaneous three times a day before meals  metolazone 5 milliGRAM(s) Oral daily  metoprolol succinate ER 25 milliGRAM(s) Oral daily  ondansetron Injectable 4 milliGRAM(s) IV Push every 8 hours PRN  pantoprazole  Injectable 40 milliGRAM(s) IV Push two times a day  tamsulosin 0.4 milliGRAM(s) Oral at bedtime  tiotropium 2.5 MICROgram(s) Inhaler 2 Puff(s) Inhalation daily

## 2025-01-21 NOTE — PROGRESS NOTE ADULT - ASSESSMENT
79M with CAD/CABG, HFrEF 25%, ICD, afib, copd on home o2, htn hld dm p/w adhf.     ADHF    cont diuresis, currently on lasix 80 iv bid, cont metolazone, renal recs appreciated   monitor renal function and electrolytes   will add spironolactone 25mg daily if creat downtrending   CAD Hx, on ASA at home, currently off  +Lt UE DVT - on AC with iv heparin  COPD management

## 2025-01-21 NOTE — PROGRESS NOTE ADULT - SUBJECTIVE AND OBJECTIVE BOX
[facility] NEPHROLOGY SERVICES, [facility]  NEPHROLOGY AND HYPERTENSION  [address]  [address]  [place] [address]  [phone]    MD DANIEL GARRETT MD YELENA ROSENBERG, MD BINNY KOSHY, MD CHRISTOPHER CAPUTO, MD EDWARD BOVER, MD          Patient events noted  No distress    MEDICATIONS  (STANDING):  atorvastatin 20 milliGRAM(s) Oral at bedtime  dextrose 5%. 1000 milliLiter(s) (50 mL/Hr) IV Continuous <Continuous>  dextrose 5%. 1000 milliLiter(s) (100 mL/Hr) IV Continuous <Continuous>  dextrose 50% Injectable 25 Gram(s) IV Push once  dextrose 50% Injectable 12.5 Gram(s) IV Push once  dextrose 50% Injectable 25 Gram(s) IV Push once  fluticasone propionate/ salmeterol 250-50 MICROgram(s) Diskus 1 Dose(s) Inhalation two times a day  furosemide   Injectable 80 milliGRAM(s) IV Push every 8 hours  glucagon  Injectable 1 milliGRAM(s) IntraMuscular once  heparin  Infusion.  Unit(s)/Hr (20 mL/Hr) IV Continuous <Continuous>  influenza  Vaccine (HIGH DOSE) 0.5 milliLiter(s) IntraMuscular once  insulin glargine Injectable (LANTUS) 28 Unit(s) SubCutaneous at bedtime  insulin lispro (ADMELOG) corrective regimen sliding scale   SubCutaneous three times a day before meals  metolazone 5 milliGRAM(s) Oral daily  metoprolol succinate ER 25 milliGRAM(s) Oral daily  pantoprazole  Injectable 40 milliGRAM(s) IV Push two times a day  tamsulosin 0.4 milliGRAM(s) Oral at bedtime  tiotropium 2.5 MICROgram(s) Inhaler 2 Puff(s) Inhalation daily    MEDICATIONS  (PRN):  acetaminophen     Tablet .. 650 milliGRAM(s) Oral every 6 hours PRN Temp greater or equal to 38C (100.4F), Mild Pain (1 - 3)  albuterol/ipratropium for Nebulization.. 3 milliLiter(s) Inhalation every 6 hours PRN -for shortness of breath and/or wheezing  aluminum hydroxide/magnesium hydroxide/simethicone Suspension 30 milliLiter(s) Oral every 4 hours PRN Dyspepsia  dextrose Oral Gel 15 Gram(s) Oral once PRN Blood Glucose LESS THAN 70 milliGRAM(s)/deciliter  heparin   Injectable 9000 Unit(s) IV Push every 6 hours PRN For aPTT less than 40  heparin   Injectable 4500 Unit(s) IV Push every 6 hours PRN For aPTT between 40 - 57  ondansetron Injectable 4 milliGRAM(s) IV Push every 8 hours PRN Nausea and/or Vomiting      25 @ 07:01  -  25 @ 07:00  --------------------------------------------------------  IN: 0 mL / OUT: 1720 mL / NET: -1720 mL    25 @ 07:01  -  25 @ 21:19  --------------------------------------------------------  IN: 555 mL / OUT: 1590 mL / NET: -1035 mL      PHYSICAL EXAM:      T(C): 36.6 (25 @ 16:11), Max: 36.7 (25 @ 11:30)  HR: 71 (25 @ 16:11) (66 - 75)  BP: 129/70 (25 @ 16:11) (101/63 - 129/70)  RR: 18 (25 @ 16:11) (18 - 18)  SpO2: 96% (25 @ 16:11) (96% - 98%)  Wt(kg): --  Lungs clear  Heart S1S2  Abd soft NT ND  Extremities:   1-2  edema        Daily Weight Trend:   Weight in k.4 (25 @ 04:37)  Weight in k.9 (25 @ 04:51)  Weight in k.9 (25 @ 10:27)  Weight in k.4 (25 @ 04:53)                              8.8    10.23 )-----------( 127      ( 2025 07:30 )             31.7     01-    138  |  92[L]  |  61[H]  ----------------------------<  74  4.1   |  43[H]  |  2.49[H]    Ca    9.3      2025 07:30          Creatinine Trend: 2.49<--, 2.70<--, 2.35<--, 2.28<--, 2.47<--, 2.57<--      Assessment   Fluid overload   DENISHA CKD 3-4     Plan:  Continue intermittent diuretic regimen  PT eval   Follow abg    Jon Rowe MD Message left on cell to call back with name of therapist to contact regarding labs.  Electronically signed by Greg Schoen, MD

## 2025-01-22 LAB
ANION GAP SERPL CALC-SCNC: <5 MMOL/L — SIGNIFICANT CHANGE UP (ref 5–17)
APTT BLD: 115.2 SEC — HIGH (ref 24.5–35.6)
APTT BLD: 41.7 SEC — HIGH (ref 24.5–35.6)
APTT BLD: 49.2 SEC — HIGH (ref 24.5–35.6)
APTT BLD: 77.3 SEC — HIGH (ref 24.5–35.6)
BASE EXCESS BLDA CALC-SCNC: 27 MMOL/L — HIGH (ref -2–3)
BLOOD GAS COMMENTS ARTERIAL: SIGNIFICANT CHANGE UP
BUN SERPL-MCNC: 60 MG/DL — HIGH (ref 7–23)
CALCIUM SERPL-MCNC: 9.5 MG/DL — SIGNIFICANT CHANGE UP (ref 8.5–10.1)
CHLORIDE SERPL-SCNC: 89 MMOL/L — LOW (ref 96–108)
CO2 BLDA-SCNC: 56 MMOL/L — CRITICAL HIGH (ref 19–24)
CO2 SERPL-SCNC: >45 MMOL/L — CRITICAL HIGH (ref 22–31)
CREAT SERPL-MCNC: 2.31 MG/DL — HIGH (ref 0.5–1.3)
EGFR: 28 ML/MIN/1.73M2 — LOW
GAS PNL BLDA: SIGNIFICANT CHANGE UP
GLUCOSE BLDC GLUCOMTR-MCNC: 133 MG/DL — HIGH (ref 70–99)
GLUCOSE BLDC GLUCOMTR-MCNC: 173 MG/DL — HIGH (ref 70–99)
GLUCOSE BLDC GLUCOMTR-MCNC: 221 MG/DL — HIGH (ref 70–99)
GLUCOSE BLDC GLUCOMTR-MCNC: 85 MG/DL — SIGNIFICANT CHANGE UP (ref 70–99)
GLUCOSE SERPL-MCNC: 86 MG/DL — SIGNIFICANT CHANGE UP (ref 70–99)
HCO3 BLDA-SCNC: 54 MMOL/L — CRITICAL HIGH (ref 21–28)
HCT VFR BLD CALC: 31.1 % — LOW (ref 39–50)
HGB BLD-MCNC: 8.7 G/DL — LOW (ref 13–17)
HOROWITZ INDEX BLDA+IHG-RTO: 36 — SIGNIFICANT CHANGE UP
MAGNESIUM SERPL-MCNC: 2 MG/DL — SIGNIFICANT CHANGE UP (ref 1.6–2.6)
MCHC RBC-ENTMCNC: 26.4 PG — LOW (ref 27–34)
MCHC RBC-ENTMCNC: 28 G/DL — LOW (ref 32–36)
MCV RBC AUTO: 94.5 FL — SIGNIFICANT CHANGE UP (ref 80–100)
NRBC # BLD: 0 /100 WBCS — SIGNIFICANT CHANGE UP (ref 0–0)
NRBC BLD-RTO: 0 /100 WBCS — SIGNIFICANT CHANGE UP (ref 0–0)
NT-PROBNP SERPL-SCNC: 4647 PG/ML — HIGH (ref 0–450)
PCO2 BLDA: 66 MMHG — HIGH (ref 32–46)
PH BLDA: 7.52 — HIGH (ref 7.35–7.45)
PLATELET # BLD AUTO: 133 K/UL — LOW (ref 150–400)
PO2 BLDA: 73 MMHG — LOW (ref 83–108)
POTASSIUM SERPL-MCNC: 3.3 MMOL/L — LOW (ref 3.5–5.3)
POTASSIUM SERPL-SCNC: 3.3 MMOL/L — LOW (ref 3.5–5.3)
RBC # BLD: 3.29 M/UL — LOW (ref 4.2–5.8)
RBC # FLD: 27.6 % — HIGH (ref 10.3–14.5)
SAO2 % BLDA: 95 % — SIGNIFICANT CHANGE UP (ref 94–98)
SODIUM SERPL-SCNC: 139 MMOL/L — SIGNIFICANT CHANGE UP (ref 135–145)
TSH SERPL-MCNC: 2.85 UIU/ML — SIGNIFICANT CHANGE UP (ref 0.36–3.74)
WBC # BLD: 9.98 K/UL — SIGNIFICANT CHANGE UP (ref 3.8–10.5)
WBC # FLD AUTO: 9.98 K/UL — SIGNIFICANT CHANGE UP (ref 3.8–10.5)

## 2025-01-22 PROCEDURE — 99233 SBSQ HOSP IP/OBS HIGH 50: CPT

## 2025-01-22 RX ORDER — RIVAROXABAN 20 MG/1
15 TABLET, FILM COATED ORAL
Refills: 0 | Status: DISCONTINUED | OUTPATIENT
Start: 2025-01-22 | End: 2025-02-05

## 2025-01-22 RX ORDER — POTASSIUM CHLORIDE 750 MG/1
40 TABLET, EXTENDED RELEASE ORAL EVERY 6 HOURS
Refills: 0 | Status: COMPLETED | OUTPATIENT
Start: 2025-01-22 | End: 2025-01-22

## 2025-01-22 RX ORDER — INSULIN GLARGINE-YFGN 100 [IU]/ML
22 INJECTION, SOLUTION SUBCUTANEOUS AT BEDTIME
Refills: 0 | Status: DISCONTINUED | OUTPATIENT
Start: 2025-01-22 | End: 2025-01-28

## 2025-01-22 RX ORDER — PANTOPRAZOLE 20 MG/1
40 TABLET, DELAYED RELEASE ORAL
Refills: 0 | Status: DISCONTINUED | OUTPATIENT
Start: 2025-01-22 | End: 2025-02-05

## 2025-01-22 RX ADMIN — FLUTICASONE PROPIONATE AND SALMETEROL 1 DOSE(S): 113; 14 POWDER, METERED RESPIRATORY (INHALATION) at 18:31

## 2025-01-22 RX ADMIN — RIVAROXABAN 15 MILLIGRAM(S): 20 TABLET, FILM COATED ORAL at 18:29

## 2025-01-22 RX ADMIN — Medication UNIT(S)/HR: at 16:35

## 2025-01-22 RX ADMIN — PANTOPRAZOLE 40 MILLIGRAM(S): 20 TABLET, DELAYED RELEASE ORAL at 18:29

## 2025-01-22 RX ADMIN — Medication 80 MILLIGRAM(S): at 01:28

## 2025-01-22 RX ADMIN — Medication 25 MILLIGRAM(S): at 05:47

## 2025-01-22 RX ADMIN — PANTOPRAZOLE 40 MILLIGRAM(S): 20 TABLET, DELAYED RELEASE ORAL at 05:47

## 2025-01-22 RX ADMIN — ATORVASTATIN CALCIUM 20 MILLIGRAM(S): 80 TABLET, FILM COATED ORAL at 21:23

## 2025-01-22 RX ADMIN — TAMSULOSIN HYDROCHLORIDE 0.4 MILLIGRAM(S): 0.4 CAPSULE ORAL at 21:23

## 2025-01-22 RX ADMIN — Medication UNIT(S)/HR: at 07:11

## 2025-01-22 RX ADMIN — Medication 250 MILLIGRAM(S): at 21:22

## 2025-01-22 RX ADMIN — TIOTROPIUM BROMIDE MONOHYDRATE 2 PUFF(S): 18 CAPSULE ORAL; RESPIRATORY (INHALATION) at 11:18

## 2025-01-22 RX ADMIN — Medication UNIT(S)/HR: at 07:30

## 2025-01-22 RX ADMIN — Medication 1: at 16:19

## 2025-01-22 RX ADMIN — FLUTICASONE PROPIONATE AND SALMETEROL 1 DOSE(S): 113; 14 POWDER, METERED RESPIRATORY (INHALATION) at 05:47

## 2025-01-22 RX ADMIN — POTASSIUM CHLORIDE 40 MILLIEQUIVALENT(S): 750 TABLET, EXTENDED RELEASE ORAL at 09:38

## 2025-01-22 RX ADMIN — Medication 250 MILLIGRAM(S): at 09:38

## 2025-01-22 RX ADMIN — IPRATROPIUM BROMIDE AND ALBUTEROL SULFATE 3 MILLILITER(S): .5; 2.5 SOLUTION RESPIRATORY (INHALATION) at 14:53

## 2025-01-22 RX ADMIN — POTASSIUM CHLORIDE 40 MILLIEQUIVALENT(S): 750 TABLET, EXTENDED RELEASE ORAL at 11:17

## 2025-01-22 RX ADMIN — Medication UNIT(S)/HR: at 00:34

## 2025-01-22 RX ADMIN — INSULIN GLARGINE-YFGN 22 UNIT(S): 100 INJECTION, SOLUTION SUBCUTANEOUS at 21:23

## 2025-01-22 RX ADMIN — Medication UNIT(S)/HR: at 07:09

## 2025-01-22 NOTE — PROGRESS NOTE ADULT - SUBJECTIVE AND OBJECTIVE BOX
Patient is a 79y old  Male who presents with a chief complaint of Acute on chronic combined HFpEF and HFrEF exacerbation, acute blood loss anemia secondary to suspected upper GI bleed (22 Jan 2025 12:50)    INTERVAL HPI/OVERNIGHT EVENTS: Patients seen and examined at bedside this morning. No acute events overnight. Pt reports SOB improving.     MEDICATIONS  (STANDING):  acetaZOLAMIDE Injectable 250 milliGRAM(s) IV Push every 12 hours  atorvastatin 20 milliGRAM(s) Oral at bedtime  dextrose 5%. 1000 milliLiter(s) (50 mL/Hr) IV Continuous <Continuous>  dextrose 5%. 1000 milliLiter(s) (100 mL/Hr) IV Continuous <Continuous>  dextrose 50% Injectable 25 Gram(s) IV Push once  dextrose 50% Injectable 12.5 Gram(s) IV Push once  dextrose 50% Injectable 25 Gram(s) IV Push once  fluticasone propionate/ salmeterol 250-50 MICROgram(s) Diskus 1 Dose(s) Inhalation two times a day  glucagon  Injectable 1 milliGRAM(s) IntraMuscular once  heparin  Infusion.  Unit(s)/Hr (20 mL/Hr) IV Continuous <Continuous>  influenza  Vaccine (HIGH DOSE) 0.5 milliLiter(s) IntraMuscular once  insulin glargine Injectable (LANTUS) 22 Unit(s) SubCutaneous at bedtime  insulin lispro (ADMELOG) corrective regimen sliding scale   SubCutaneous three times a day before meals  metoprolol succinate ER 25 milliGRAM(s) Oral daily  pantoprazole    Tablet 40 milliGRAM(s) Oral two times a day  tamsulosin 0.4 milliGRAM(s) Oral at bedtime  tiotropium 2.5 MICROgram(s) Inhaler 2 Puff(s) Inhalation daily    MEDICATIONS  (PRN):  acetaminophen     Tablet .. 650 milliGRAM(s) Oral every 6 hours PRN Temp greater or equal to 38C (100.4F), Mild Pain (1 - 3)  albuterol/ipratropium for Nebulization.. 3 milliLiter(s) Inhalation every 6 hours PRN -for shortness of breath and/or wheezing  aluminum hydroxide/magnesium hydroxide/simethicone Suspension 30 milliLiter(s) Oral every 4 hours PRN Dyspepsia  dextrose Oral Gel 15 Gram(s) Oral once PRN Blood Glucose LESS THAN 70 milliGRAM(s)/deciliter  heparin   Injectable 9000 Unit(s) IV Push every 6 hours PRN For aPTT less than 40  heparin   Injectable 4500 Unit(s) IV Push every 6 hours PRN For aPTT between 40 - 57  ondansetron Injectable 4 milliGRAM(s) IV Push every 8 hours PRN Nausea and/or Vomiting    Allergies    No Known Allergies    Intolerances      REVIEW OF SYSTEMS:  All other systems reviewed and are negative    Vital Signs Last 24 Hrs  T(C): 36.6 (22 Jan 2025 16:08), Max: 36.6 (21 Jan 2025 23:52)  T(F): 97.8 (22 Jan 2025 16:08), Max: 97.9 (21 Jan 2025 23:52)  HR: 72 (22 Jan 2025 16:08) (61 - 72)  BP: 102/64 (22 Jan 2025 16:08) (102/64 - 113/73)  BP(mean): --  RR: 18 (22 Jan 2025 16:08) (18 - 18)  SpO2: 97% (22 Jan 2025 16:08) (92% - 99%)    Parameters below as of 22 Jan 2025 16:08  Patient On (Oxygen Delivery Method): nasal cannula      Daily     Daily   I&O's Summary    21 Jan 2025 07:01  -  22 Jan 2025 07:00  --------------------------------------------------------  IN: 555 mL / OUT: 2090 mL / NET: -1535 mL    22 Jan 2025 07:01  -  22 Jan 2025 16:52  --------------------------------------------------------  IN: 0 mL / OUT: 500 mL / NET: -500 mL      CAPILLARY BLOOD GLUCOSE      POCT Blood Glucose.: 173 mg/dL (22 Jan 2025 16:18)  POCT Blood Glucose.: 133 mg/dL (22 Jan 2025 11:02)  POCT Blood Glucose.: 85 mg/dL (22 Jan 2025 07:48)  POCT Blood Glucose.: 214 mg/dL (21 Jan 2025 21:06)    PHYSICAL EXAM:  GENERAL:  NAD Alert and Oriented x 3   HEENT: NCAT  CARDIOVASCULAR:  S1, S2  LUNGS: bibasilar crackles.   ABDOMEN:  soft, (-) tenderness, (-) distension, (+) bowel sounds, (-) guarding, (-) rebound (-) rigidity  EXTREMITIES:  no cyanosis / clubbing.  2+ edema.       Labs                          8.7    9.98  )-----------( 133      ( 22 Jan 2025 06:31 )             31.1     01-22    139  |  89[L]  |  60[H]  ----------------------------<  86  3.3[L]   |  >45[HH]  |  2.31[H]    Ca    9.5      22 Jan 2025 06:31  Mg     2.0     01-22      PTT - ( 22 Jan 2025 15:20 )  PTT:115.2 sec      Urinalysis Basic - ( 22 Jan 2025 06:31 )    Color: x / Appearance: x / SG: x / pH: x  Gluc: 86 mg/dL / Ketone: x  / Bili: x / Urobili: x   Blood: x / Protein: x / Nitrite: x   Leuk Esterase: x / RBC: x / WBC x   Sq Epi: x / Non Sq Epi: x / Bacteria: x                      Radiology and Imaging reviewed.

## 2025-01-22 NOTE — PROGRESS NOTE ADULT - ASSESSMENT
79M with CAD/CABG, HFrEF 25%, ICD, afib, copd on home o2, htn hld dm p/w adhf, acute anemia requiring PRBC transfusion     ADHF    s/p bumex drip-> lasix 80 iv-> off now 2/2 contraction alkalosis, renal recs appreciated, acetazolamide dosing  monitor renal function, electrolytes, bicarb   aim to keep k ~4.5 with contraction alkalosis   cont on Toprol 25/d  add further GDMT for cardiomyopathy as BP and renal function allows  CAD/CABG Hx, on ASA at home, currently off, ?restart   +Lt UE DVT - on AC with iv heparin  monitor h/h closely  COPD management  79M with CAD/CABG, HFrEF 25%, ICD, afib, copd on home o2, htn hld dm p/w adhf, acute anemia requiring PRBC transfusion     ADHF    s/p bumex drip-> lasix 80 iv-> off now 2/2 contraction alkalosis, renal recs appreciated, acetazolamide dosing  monitor renal function, electrolytes, bicarb   aim to keep k ~4.5 with contraction alkalosis   cont on Toprol 25/d  add further GDMT for cardiomyopathy as BP and renal function allows  CAD/CABG Hx, on ASA at home, currently off  +Lt UE DVT - on AC with iv heparin  monitor h/h closely  COPD management

## 2025-01-22 NOTE — PROGRESS NOTE ADULT - SUBJECTIVE AND OBJECTIVE BOX
Patient is a 79y old  Male who presents with a chief complaint of sob    PAST MEDICAL & SURGICAL HISTORY:  HTN (hypertension)    COPD (chronic obstructive pulmonary disease)    HLD (hyperlipidemia)    Insulin dependent type 2 diabetes mellitus    Chronic hypoxic respiratory failure, on home oxygen therapy    Stage 4 chronic kidney disease    Chronic combined systolic and diastolic heart failure    Unspecified atrial fibrillation    S/P CABG x 3    S/P hernia repair    S/P implantation of automatic cardioverter/defibrillator (AICD)    INTERVAL HISTORY: in no acute distress, dyspnea much improved, hypervolemia improving    	  MEDICATIONS:  MEDICATIONS  (STANDING):  acetaZOLAMIDE Injectable 250 milliGRAM(s) IV Push every 12 hours x 2 doses   atorvastatin 20 milliGRAM(s) Oral at bedtime  fluticasone propionate/ salmeterol 250-50 MICROgram(s) Diskus 1 Dose(s) Inhalation two times a day  heparin  Infusion.  Unit(s)/Hr (20 mL/Hr) IV Continuous <Continuous>  insulin glargine Injectable (LANTUS) 22 Unit(s) SubCutaneous at bedtime  insulin lispro (ADMELOG) corrective regimen sliding scale   SubCutaneous three times a day before meals  metoprolol succinate ER 25 milliGRAM(s) Oral daily  pantoprazole    Tablet 40 milliGRAM(s) Oral two times a day  tamsulosin 0.4 milliGRAM(s) Oral at bedtime  tiotropium 2.5 MICROgram(s) Inhaler 2 Puff(s) Inhalation daily    MEDICATIONS  (PRN):  acetaminophen     Tablet .. 650 milliGRAM(s) Oral every 6 hours PRN Temp greater or equal to 38C (100.4F), Mild Pain (1 - 3)  albuterol/ipratropium for Nebulization.. 3 milliLiter(s) Inhalation every 6 hours PRN -for shortness of breath and/or wheezing  aluminum hydroxide/magnesium hydroxide/simethicone Suspension 30 milliLiter(s) Oral every 4 hours PRN Dyspepsia  dextrose Oral Gel 15 Gram(s) Oral once PRN Blood Glucose LESS THAN 70 milliGRAM(s)/deciliter  heparin   Injectable 9000 Unit(s) IV Push every 6 hours PRN For aPTT less than 40  heparin   Injectable 4500 Unit(s) IV Push every 6 hours PRN For aPTT between 40 - 57  ondansetron Injectable 4 milliGRAM(s) IV Push every 8 hours PRN Nausea and/or Vomiting    Vitals:  T(F): 97.2 (01-22-25 @ 11:24), Max: 97.9 (01-21-25 @ 16:11)  HR: 70 (01-22-25 @ 11:24) (61 - 75)  BP: 105/61 (01-22-25 @ 11:24) (105/61 - 129/70)  RR: 18 (01-22-25 @ 11:24) (18 - 18)  SpO2: 92% (01-22-25 @ 11:24) (92% - 99%)    01-21 @ 07:01  -  01-22 @ 07:00  --------------------------------------------------------  IN:    Oral Fluid: 555 mL  Total IN: 555 mL    OUT:    Incontinent per Condom Catheter (mL): 1590 mL    Voided (mL): 500 mL  Total OUT: 2090 mL    Total NET: -1535 mL    PHYSICAL EXAM:  Neuro: Awake, responsive  CV: S1 S2 RRR  Lungs: diminished to bases   GI: Soft, BS +, ND, NT  Extremities: LE edema, Lt UE edema    TELEMETRY: paced, pvcs    RADIOLOGY: < from: US Duplex Venous Upper Ext Ltd, Left (01.11.25 @ 02:59) >  Positive for thrombus within the left brachial and basilic vein.    < end of copied text >  < from: Xray Chest 1 View-PORTABLE IMMEDIATE (Xray Chest 1 View-PORTABLE IMMEDIATE .) (01.10.25 @ 17:13) >  IMPRESSION: Stable cardiac findings. Fluid is a left base again seen.   Overall chest has improved from prior.    < end of copied text >    DIAGNOSTIC TESTING:    [x ] Echocardiogram: < from: TTE Echo Complete w/ Contrast w/ Doppler (08.08.24 @ 13:27) >     1. Left ventricular cavity is normal in size. Left ventricular systolic   function is severely decreased with an ejection fraction visually   estimated at 25 to 30 %. Global left ventricular hypokinesis.   2. There is severe (grade 3) left ventricular diastolic dysfunction,   with elevated left ventricular filling pressure.   3. The right ventricle is not well visualized.   4. Device lead is visualized in the right heart.   5. No significant valvular disease.   6. No pericardial effusion seen.   7. Estimated pulmonary artery systolic pressure is 53 mmHg, consistent   with moderate pulmonary hypertension.   8. Left and moderate left pleural effusion noted.   9. Mild pulmonic regurgitation.  10. Moderate tricuspid regurgitation.    < end of copied text >    < from: NM Pharm Stress Test, Dual Isotope (03.17.21 @ 12:40) >    1. There was scintigraphic evidence for a myocardial infarct in the RCA territory with no significant ischemia.    2. There is severely abnormal left ventricular contractility, globally diminished calculated ejection fraction and no wall motion abnormlaities. Overall post stress ejection fraction was 21%    < end of copied text >  < from: NM Pharm Stress Test, Dual Isotope (03.17.21 @ 12:40) >    1. There was scintigraphic evidence for a myocardial infarct in the RCA territory with no significant ischemia.    2. There is severely abnormal left ventricular contractility, globally diminished calculated ejection fraction and no wall motion abnormlaities. Overall post stress ejection fraction was 21%    < end of copied text >    LABS:	 	    22 Jan 2025 06:31    139    |  89     |  60     ----------------------------<  86     3.3     |  >45    |  2.31   21 Jan 2025 07:30    138    |  92     |  61     ----------------------------<  74     4.1     |  43     |  2.49   20 Jan 2025 05:00    138    |  91     |  65     ----------------------------<  156    3.4     |  42     |  2.70     Ca    9.5        22 Jan 2025 06:31  Mg     2.0       22 Jan 2025 06:31                        8.7    9.98  )-----------( 133      ( 22 Jan 2025 06:31 )             31.1 ,                       8.8    10.23 )-----------( 127      ( 21 Jan 2025 07:30 )             31.7 ,                       8.2    x     )-----------( x        ( 20 Jan 2025 15:45 )             28.9 ,                       7.9    10.80 )-----------( 119      ( 20 Jan 2025 05:00 )             28.2   pro-BNP: Pro-Brain Natriuretic Peptide: 4647 pg/mL (01.22.25 @ 06:31)  TSH: Thyroid Stimulating Hormone, Serum: 2.850 uIU/mL (01-22 @ 06:31)

## 2025-01-22 NOTE — PHARMACOTHERAPY INTERVENTION NOTE - COMMENTS
Recommended pantoprazole IV to PO switch- patient tolerating PO meds.
Recommended insulin glargine dose reduction to 22 units QHS to avoid potential hypoglycemia, since patient has had 2 AM glucose readings below 100 mg/dL.

## 2025-01-22 NOTE — PROGRESS NOTE ADULT - ASSESSMENT
Arun Solares is an 79 year old male with PMHx of HTN, HLD, IDDM2, CAD (s/p CABG), pAF (on rivaroxaban), chronic combined HFpEF and HFrEF (LV EF 25-30% and grade III diastolic dysfunction on echo 8/9/24, s/p AICD placement), hx of severe aortic stenosis (s/p TAVR), chronic hypoxic respiratory failure secondary to COPD (baseline on 3-4L NC at home), CKD stage IV, and BPH who presented to the ED on 1/10/25 for complaints of shortness of breath and admitted for acute on chronic combined HFpEF and HFrEF exacerbation and acute blood loss anemia secondary to suspected upper GI bleed.    # Acute on chronic combined HFpEF and HFrEF exacerbation  -Likely due to non compliance with medications particularly diuretics as per wife.  Sleeps on recliner due to inability to lay flat, denies paroxysmal nocturnal dyspnea.  Pro-BNP 4773 on admission, Prior TTE (on 8/8/24) with LV EF 25-30%, global LV hypokinesis, grade III diastolic dysfunction, moderate pHTN, mild AK, moderate TR   -strict Is and Os, daily weights, low salt diet, 2L fluid restriction  -Continue GDMT with ACEi and beta-blockade  -Some response to aggressive diuresis on Lasix gtt.  Now on IV  Cardiology following.    (1/22) Diuresis well. C/W lasix. Transition heparin to xarelto.   PT eval    # Hypokalemia - in setting of Lasix gtt.  Supplement K.  Repeat K level stable.     # LUE DVT - noted on LUE U/S.  Hgb stable.  Start Heparin gtt.  Risks, benefits and alternatives to AC were discussed with wife 1/18.  IN agreement with starting AC.  Consider transition to DOAC if H/H remains stable.     # Acute blood loss anemia secondary to suspected upper GI bleed Differential includes PUD (given recent NSAID use) Reports increased shortness of breath x 2 months, likely component of SOB due to anemia given severity Admits to two black bowel movements, most recent colonoscopy > 10 years ago and had polyps removed at that time Hgb 6.5 on admission, baseline ~14? but most recent hgb 10.4 (on 11/25/24)>>8.5 s/p 2PRBCs in ED.  H/H stable.   Hgb has been stable. Gi recommendations appreciated - pt does not appear to be actively bleeding, defers endoscopic evaluation for now unless bleeding recurs.    Drop in H/H noted.  Repeat H/H stable.     # HTN: PTA metoprolol succinate 50 mg, ramipril 2.5 mg reordered as lisinopril 10 mg given ramipril not on formulary  # HLD: PTA simvastatin 40 mg reordered as atorvastatin 20 mg given simvastatin not on formulary  # IDDM2 with hyperglycemia: last known A1c 9.4 (on 11/25/24), blood glucose 256 on admission, POC qac and qhs, PTA Lantus 15 U qhs, low dose SSI qac started, blood glucose goal < 180  # CAD (s/p CABG): PTA ASA 81 mg held due to suspected GI bleed  # pAF (on rivaroxaban): Rate control.  AC as above.    # Chronic hypoxic respiratory failure secondary to COPD (baseline on 3-4L NC at home): PTA tiotropium, symbicort reordered as advair, duonebs PRN  # CKD stage IV: BUN 61 and Cr 2.71 on admission, baseline Cr appears to fluctuate but Cr 2.92 (on 11/25/24), avoid nephrotoxins, renally dose meds, encourage PO hydration, pending official nephrology recs  # BPH: PTA tamsulosin 0.4 mg  # Inpatient DVT Prophylaxis - on AC  Plan of care d/w wife at bedside.

## 2025-01-22 NOTE — PROGRESS NOTE ADULT - SUBJECTIVE AND OBJECTIVE BOX
[facility] NEPHROLOGY SERVICES, [facility]  NEPHROLOGY AND HYPERTENSION  [address]  [address]  [place] [address]  [phone]    MD DANIEL GARRETT MD YELENA ROSENBERG, MD BINNY KOSHY, MD CHRISTOPHER CAPUTO, MD EDWARD BOVER, MD          Patient events noted    MEDICATIONS  (STANDING):  atorvastatin 20 milliGRAM(s) Oral at bedtime  dextrose 5%. 1000 milliLiter(s) (100 mL/Hr) IV Continuous <Continuous>  dextrose 5%. 1000 milliLiter(s) (50 mL/Hr) IV Continuous <Continuous>  dextrose 50% Injectable 25 Gram(s) IV Push once  dextrose 50% Injectable 12.5 Gram(s) IV Push once  dextrose 50% Injectable 25 Gram(s) IV Push once  fluticasone propionate/ salmeterol 250-50 MICROgram(s) Diskus 1 Dose(s) Inhalation two times a day  glucagon  Injectable 1 milliGRAM(s) IntraMuscular once  influenza  Vaccine (HIGH DOSE) 0.5 milliLiter(s) IntraMuscular once  insulin glargine Injectable (LANTUS) 22 Unit(s) SubCutaneous at bedtime  insulin lispro (ADMELOG) corrective regimen sliding scale   SubCutaneous three times a day before meals  metoprolol succinate ER 25 milliGRAM(s) Oral daily  pantoprazole    Tablet 40 milliGRAM(s) Oral two times a day  rivaroxaban 15 milliGRAM(s) Oral with dinner  tamsulosin 0.4 milliGRAM(s) Oral at bedtime  tiotropium 2.5 MICROgram(s) Inhaler 2 Puff(s) Inhalation daily    MEDICATIONS  (PRN):  acetaminophen     Tablet .. 650 milliGRAM(s) Oral every 6 hours PRN Temp greater or equal to 38C (100.4F), Mild Pain (1 - 3)  albuterol/ipratropium for Nebulization.. 3 milliLiter(s) Inhalation every 6 hours PRN -for shortness of breath and/or wheezing  aluminum hydroxide/magnesium hydroxide/simethicone Suspension 30 milliLiter(s) Oral every 4 hours PRN Dyspepsia  dextrose Oral Gel 15 Gram(s) Oral once PRN Blood Glucose LESS THAN 70 milliGRAM(s)/deciliter  heparin   Injectable 4500 Unit(s) IV Push every 6 hours PRN For aPTT between 40 - 57  ondansetron Injectable 4 milliGRAM(s) IV Push every 8 hours PRN Nausea and/or Vomiting      01-21-25 @ 07:01  -  01-22-25 @ 07:00  --------------------------------------------------------  IN: 555 mL / OUT: 2090 mL / NET: -1535 mL    01-22-25 @ 07:01  -  01-22-25 @ 22:51  --------------------------------------------------------  IN: 0 mL / OUT: 750 mL / NET: -750 mL      PHYSICAL EXAM:      T(C): 36.6 (01-22-25 @ 16:08), Max: 36.6 (01-21-25 @ 23:52)  HR: 72 (01-22-25 @ 16:08) (61 - 72)  BP: 102/64 (01-22-25 @ 16:08) (102/64 - 113/73)  RR: 18 (01-22-25 @ 16:08) (18 - 18)  SpO2: 97% (01-22-25 @ 16:08) (92% - 99%)  Wt(kg): --  Lungs clear  Heart S1S2  Abd soft NT ND  Extremities:   tr edema                                    8.7    9.98  )-----------( 133      ( 22 Jan 2025 06:31 )             31.1     01-22    139  |  89[L]  |  60[H]  ----------------------------<  86  3.3[L]   |  >45[HH]  |  2.31[H]    Ca    9.5      22 Jan 2025 06:31  Mg     2.0     01-22      ABG - ( 22 Jan 2025 06:03 )  pH, Arterial: 7.52  pH, Blood: x     /  pCO2: 66    /  pO2: 73    / HCO3: 54    / Base Excess: 27.0  /  SaO2: 95.0                Creatinine Trend: 2.31<--, 2.49<--, 2.70<--, 2.35<--, 2.28<--, 2.47<--      Assessment   Fluid overload   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

## 2025-01-22 NOTE — CHART NOTE - NSCHARTNOTEFT_GEN_A_CORE
Per chart pt with PMH: HTN, CHF, HLD, T2DM, CAD, pAF, AS, COPD, CKD4, presents with increased SOB, and fluid retention, found with acute on chronic HF, acute anemia secondary to upper GI bleed. Palliative consulted for GOC; DNR/DNI as per MOLST.    Factors impacting intake: [ ] none [ ] nausea  [ ] vomiting [ ] diarrhea [ ] constipation  [ ]chewing problems [ ] swallowing issues  [x] other: variable appetite    Diet Prescription: Diet, Consistent Carbohydrate/No Snacks:   DASH/TLC {Sodium & Cholesterol Restricted} (01-14-25 @ 15:26)    Intake: 0-100% as per flow sheets    Current Weight: Weight (kg): (01/21) 109.4kg (01/11) 107kg indicating weight gain of 2.4kg  % Weight Change: 2.2% weight gain x 10days; pt noted with fluctuating moderate edema throughout LOS    Edema: 1+ right foot and leg; 2+ left foot and leg; 3+ left hand, writs, arm as per flow sheets    Pertinent Medications: MEDICATIONS  (STANDING):  acetaZOLAMIDE Injectable 250 milliGRAM(s) IV Push every 12 hours  atorvastatin 20 milliGRAM(s) Oral at bedtime  dextrose 5%. 1000 milliLiter(s) (100 mL/Hr) IV Continuous <Continuous>  dextrose 5%. 1000 milliLiter(s) (50 mL/Hr) IV Continuous <Continuous>  dextrose 50% Injectable 25 Gram(s) IV Push once  dextrose 50% Injectable 12.5 Gram(s) IV Push once  dextrose 50% Injectable 25 Gram(s) IV Push once  fluticasone propionate/ salmeterol 250-50 MICROgram(s) Diskus 1 Dose(s) Inhalation two times a day  glucagon  Injectable 1 milliGRAM(s) IntraMuscular once  heparin  Infusion.  Unit(s)/Hr (20 mL/Hr) IV Continuous <Continuous>  influenza  Vaccine (HIGH DOSE) 0.5 milliLiter(s) IntraMuscular once  insulin glargine Injectable (LANTUS) 28 Unit(s) SubCutaneous at bedtime  insulin lispro (ADMELOG) corrective regimen sliding scale   SubCutaneous three times a day before meals  metoprolol succinate ER 25 milliGRAM(s) Oral daily  pantoprazole  Injectable 40 milliGRAM(s) IV Push two times a day  potassium chloride   Powder 40 milliEquivalent(s) Oral every 6 hours  tamsulosin 0.4 milliGRAM(s) Oral at bedtime  tiotropium 2.5 MICROgram(s) Inhaler 2 Puff(s) Inhalation daily    MEDICATIONS  (PRN):  acetaminophen     Tablet .. 650 milliGRAM(s) Oral every 6 hours PRN Temp greater or equal to 38C (100.4F), Mild Pain (1 - 3)  albuterol/ipratropium for Nebulization.. 3 milliLiter(s) Inhalation every 6 hours PRN -for shortness of breath and/or wheezing  aluminum hydroxide/magnesium hydroxide/simethicone Suspension 30 milliLiter(s) Oral every 4 hours PRN Dyspepsia  dextrose Oral Gel 15 Gram(s) Oral once PRN Blood Glucose LESS THAN 70 milliGRAM(s)/deciliter  heparin   Injectable 9000 Unit(s) IV Push every 6 hours PRN For aPTT less than 40  heparin   Injectable 4500 Unit(s) IV Push every 6 hours PRN For aPTT between 40 - 57  ondansetron Injectable 4 milliGRAM(s) IV Push every 8 hours PRN Nausea and/or Vomiting    Pertinent Labs: 01-22 Na139 mmol/L Glu 86 mg/dL K+ 3.3 mmol/L[L] Cr  2.31 mg/dL[H] BUN 60 mg/dL[H] 01-18 Phos 3.9 mg/dL      Skin: stage II pressure injury of right buttocks as per flow sheets    Estimated Needs:   [x] no change since previous assessment: for kcal needs  [x] recalculated based on IBW 75.5kg for protein needs: 1.2-1.4g protein/kg= 90.6-105.7g protein    Previous Nutrition Diagnosis:   [x] Other	Decreased Nutrient Needs (Na, fluid, CHO)  Etiology	Uncontrolled DM, CHF exacerbation  Signs/Symptoms	A1C 9% (8/8/24) & wife reports 9.4% (11/25/24); admitted c SOB, 4+ edema b/l legs & left arm  Goal/Expected Outcome	Pt to verbalize need for lifestyle changes; 3 CHO foods & 3 high Na foods to limit in daily diet ()      Nutrition Diagnosis is [x] ongoing  [ ] resolved [ ] not applicable     New Nutrition Diagnosis: [ ] not applicable      Interventions:   Recommend  [ ] Change Diet To:  [ ] Nutrition Supplement  [ ] Nutrition Support  [ ] Other:     Monitoring and Evaluation:   [x] PO intake [ x ] Tolerance to diet prescription [ x ] weights [ x ] labs[ x ] follow up per protocol  [ ] other: Per chart pt with PMH: HTN, CHF, HLD, T2DM, CAD, pAF, AS, COPD, CKD4, presents with increased SOB, and fluid retention, found with acute on chronic HF, acute anemia secondary to upper GI bleed. Palliative consulted for GOC; DNR/DNI as per MOLST. Pt awake, alert, no family at bedside at time of visit.    Factors impacting intake: [ ] none [ ] nausea  [ ] vomiting [ ] diarrhea [ ] constipation  [ ]chewing problems [ ] swallowing issues  [x] other: variable appetite    Diet Prescription: Diet, Consistent Carbohydrate/No Snacks:   DASH/TLC {Sodium & Cholesterol Restricted} (01-14-25 @ 15:26)    Intake: 0-100% as per flow sheets; pt reports good appetite- amenable to Glucerna x 1/day (provides 220 kcal, 10 g protein). Preferences taken and relayed to diet office.    Current Weight: Weight (kg): (01/21) 109.4kg (01/11) 107kg indicating weight gain of 2.4kg  % Weight Change: 2.2% weight gain x 10days; pt noted with fluctuating moderate edema throughout LOS    Edema: 1+ right foot and leg; 2+ left foot and leg; 3+ left hand, wrist, arm as per flow sheets    Pertinent Medications: MEDICATIONS  (STANDING):  acetaZOLAMIDE Injectable 250 milliGRAM(s) IV Push every 12 hours  atorvastatin 20 milliGRAM(s) Oral at bedtime  dextrose 5%. 1000 milliLiter(s) (100 mL/Hr) IV Continuous <Continuous>  dextrose 5%. 1000 milliLiter(s) (50 mL/Hr) IV Continuous <Continuous>  dextrose 50% Injectable 25 Gram(s) IV Push once  dextrose 50% Injectable 12.5 Gram(s) IV Push once  dextrose 50% Injectable 25 Gram(s) IV Push once  fluticasone propionate/ salmeterol 250-50 MICROgram(s) Diskus 1 Dose(s) Inhalation two times a day  glucagon  Injectable 1 milliGRAM(s) IntraMuscular once  heparin  Infusion.  Unit(s)/Hr (20 mL/Hr) IV Continuous <Continuous>  influenza  Vaccine (HIGH DOSE) 0.5 milliLiter(s) IntraMuscular once  insulin glargine Injectable (LANTUS) 28 Unit(s) SubCutaneous at bedtime  insulin lispro (ADMELOG) corrective regimen sliding scale   SubCutaneous three times a day before meals  metoprolol succinate ER 25 milliGRAM(s) Oral daily  pantoprazole  Injectable 40 milliGRAM(s) IV Push two times a day  potassium chloride   Powder 40 milliEquivalent(s) Oral every 6 hours  tamsulosin 0.4 milliGRAM(s) Oral at bedtime  tiotropium 2.5 MICROgram(s) Inhaler 2 Puff(s) Inhalation daily    MEDICATIONS  (PRN):  acetaminophen     Tablet .. 650 milliGRAM(s) Oral every 6 hours PRN Temp greater or equal to 38C (100.4F), Mild Pain (1 - 3)  albuterol/ipratropium for Nebulization.. 3 milliLiter(s) Inhalation every 6 hours PRN -for shortness of breath and/or wheezing  aluminum hydroxide/magnesium hydroxide/simethicone Suspension 30 milliLiter(s) Oral every 4 hours PRN Dyspepsia  dextrose Oral Gel 15 Gram(s) Oral once PRN Blood Glucose LESS THAN 70 milliGRAM(s)/deciliter  heparin   Injectable 9000 Unit(s) IV Push every 6 hours PRN For aPTT less than 40  heparin   Injectable 4500 Unit(s) IV Push every 6 hours PRN For aPTT between 40 - 57  ondansetron Injectable 4 milliGRAM(s) IV Push every 8 hours PRN Nausea and/or Vomiting    Pertinent Labs: 01-22 Na139 mmol/L Glu 86 mg/dL K+ 3.3 mmol/L[L] Cr  2.31 mg/dL[H] BUN 60 mg/dL[H] 01-18 Phos 3.9 mg/dL      Skin: stage II pressure injury of right buttocks as per flow sheets    Estimated Needs:   [x] no change since previous assessment: for kcal needs  [x] recalculated based on IBW 75.5kg for protein needs: 1.2-1.4g protein/kg= 90.6-105.7g protein    Previous Nutrition Diagnosis:   [x] Other	Decreased Nutrient Needs (Na, fluid, CHO)  Etiology	Uncontrolled DM, CHF exacerbation  Signs/Symptoms	A1C 9% (8/8/24) & wife reports 9.4% (11/25/24); admitted c SOB, 4+ edema b/l legs & left arm  Goal/Expected Outcome	Pt to verbalize need for lifestyle changes; 3 CHO foods & 3 high Na foods to limit in daily diet (not met)      Nutrition Diagnosis is [x] ongoing  [ ] resolved [ ] not applicable     New Nutrition Diagnosis: [x] not applicable      Interventions:   Recommend  [ ] Change Diet To:  [x] Nutrition Supplement: Add Glucerna x 1/day (provides 220 kcal, 10 g protein)   [ ] Nutrition Support  [ ] Other:     Monitoring and Evaluation:   [x] PO intake [ x ] Tolerance to diet prescription [ x ] weights [ x ] labs[ x ] follow up per protocol  [ ] other:

## 2025-01-23 LAB
ANION GAP SERPL CALC-SCNC: 5 MMOL/L — SIGNIFICANT CHANGE UP (ref 5–17)
BUN SERPL-MCNC: 59 MG/DL — HIGH (ref 7–23)
CALCIUM SERPL-MCNC: 9 MG/DL — SIGNIFICANT CHANGE UP (ref 8.5–10.1)
CHLORIDE SERPL-SCNC: 91 MMOL/L — LOW (ref 96–108)
CO2 SERPL-SCNC: 43 MMOL/L — HIGH (ref 22–31)
CREAT SERPL-MCNC: 2.52 MG/DL — HIGH (ref 0.5–1.3)
EGFR: 25 ML/MIN/1.73M2 — LOW
GLUCOSE BLDC GLUCOMTR-MCNC: 151 MG/DL — HIGH (ref 70–99)
GLUCOSE BLDC GLUCOMTR-MCNC: 186 MG/DL — HIGH (ref 70–99)
GLUCOSE BLDC GLUCOMTR-MCNC: 194 MG/DL — HIGH (ref 70–99)
GLUCOSE BLDC GLUCOMTR-MCNC: 238 MG/DL — HIGH (ref 70–99)
GLUCOSE BLDC GLUCOMTR-MCNC: 256 MG/DL — HIGH (ref 70–99)
GLUCOSE SERPL-MCNC: 175 MG/DL — HIGH (ref 70–99)
HCT VFR BLD CALC: 31.5 % — LOW (ref 39–50)
HGB BLD-MCNC: 8.8 G/DL — LOW (ref 13–17)
MCHC RBC-ENTMCNC: 26.7 PG — LOW (ref 27–34)
MCHC RBC-ENTMCNC: 27.9 G/DL — LOW (ref 32–36)
MCV RBC AUTO: 95.7 FL — SIGNIFICANT CHANGE UP (ref 80–100)
NRBC # BLD: 0 /100 WBCS — SIGNIFICANT CHANGE UP (ref 0–0)
NRBC BLD-RTO: 0 /100 WBCS — SIGNIFICANT CHANGE UP (ref 0–0)
PLATELET # BLD AUTO: 134 K/UL — LOW (ref 150–400)
POTASSIUM SERPL-MCNC: 3.4 MMOL/L — LOW (ref 3.5–5.3)
POTASSIUM SERPL-SCNC: 3.4 MMOL/L — LOW (ref 3.5–5.3)
RBC # BLD: 3.29 M/UL — LOW (ref 4.2–5.8)
RBC # FLD: 28.3 % — HIGH (ref 10.3–14.5)
SODIUM SERPL-SCNC: 139 MMOL/L — SIGNIFICANT CHANGE UP (ref 135–145)
WBC # BLD: 9.75 K/UL — SIGNIFICANT CHANGE UP (ref 3.8–10.5)
WBC # FLD AUTO: 9.75 K/UL — SIGNIFICANT CHANGE UP (ref 3.8–10.5)

## 2025-01-23 PROCEDURE — 99233 SBSQ HOSP IP/OBS HIGH 50: CPT

## 2025-01-23 RX ORDER — POTASSIUM CHLORIDE 750 MG/1
20 TABLET, EXTENDED RELEASE ORAL ONCE
Refills: 0 | Status: COMPLETED | OUTPATIENT
Start: 2025-01-23 | End: 2025-01-23

## 2025-01-23 RX ORDER — POTASSIUM CHLORIDE 750 MG/1
40 TABLET, EXTENDED RELEASE ORAL ONCE
Refills: 0 | Status: COMPLETED | OUTPATIENT
Start: 2025-01-23 | End: 2025-01-23

## 2025-01-23 RX ADMIN — Medication 1: at 17:22

## 2025-01-23 RX ADMIN — ONDANSETRON 4 MILLIGRAM(S): 4 TABLET, ORALLY DISINTEGRATING ORAL at 21:24

## 2025-01-23 RX ADMIN — Medication 25 MILLIGRAM(S): at 05:26

## 2025-01-23 RX ADMIN — Medication 1: at 11:31

## 2025-01-23 RX ADMIN — PANTOPRAZOLE 40 MILLIGRAM(S): 20 TABLET, DELAYED RELEASE ORAL at 17:21

## 2025-01-23 RX ADMIN — TIOTROPIUM BROMIDE MONOHYDRATE 2 PUFF(S): 18 CAPSULE ORAL; RESPIRATORY (INHALATION) at 11:57

## 2025-01-23 RX ADMIN — PANTOPRAZOLE 40 MILLIGRAM(S): 20 TABLET, DELAYED RELEASE ORAL at 05:26

## 2025-01-23 RX ADMIN — FLUTICASONE PROPIONATE AND SALMETEROL 1 DOSE(S): 113; 14 POWDER, METERED RESPIRATORY (INHALATION) at 05:26

## 2025-01-23 RX ADMIN — Medication 250 MILLIGRAM(S): at 17:18

## 2025-01-23 RX ADMIN — RIVAROXABAN 15 MILLIGRAM(S): 20 TABLET, FILM COATED ORAL at 17:39

## 2025-01-23 RX ADMIN — IPRATROPIUM BROMIDE AND ALBUTEROL SULFATE 3 MILLILITER(S): .5; 2.5 SOLUTION RESPIRATORY (INHALATION) at 16:11

## 2025-01-23 RX ADMIN — Medication 1: at 08:28

## 2025-01-23 RX ADMIN — TAMSULOSIN HYDROCHLORIDE 0.4 MILLIGRAM(S): 0.4 CAPSULE ORAL at 21:35

## 2025-01-23 RX ADMIN — POTASSIUM CHLORIDE 20 MILLIEQUIVALENT(S): 750 TABLET, EXTENDED RELEASE ORAL at 17:41

## 2025-01-23 RX ADMIN — FLUTICASONE PROPIONATE AND SALMETEROL 1 DOSE(S): 113; 14 POWDER, METERED RESPIRATORY (INHALATION) at 17:42

## 2025-01-23 RX ADMIN — POTASSIUM CHLORIDE 40 MILLIEQUIVALENT(S): 750 TABLET, EXTENDED RELEASE ORAL at 13:17

## 2025-01-23 RX ADMIN — ATORVASTATIN CALCIUM 20 MILLIGRAM(S): 80 TABLET, FILM COATED ORAL at 21:24

## 2025-01-23 RX ADMIN — INSULIN GLARGINE-YFGN 22 UNIT(S): 100 INJECTION, SOLUTION SUBCUTANEOUS at 21:25

## 2025-01-23 NOTE — PROGRESS NOTE ADULT - SUBJECTIVE AND OBJECTIVE BOX
NEPHROLOGY PROGRESS NOTE    CHIEF COMPLAINT:  DENISHA/CKD    HPI  Renal function stable.  Denies sob.  States leg swelling much better.    EXAM:  Vital Signs Last 24 Hrs  T(C): 36.7 (23 Jan 2025 10:18), Max: 36.8 (23 Jan 2025 04:45)  T(F): 98.1 (23 Jan 2025 10:18), Max: 98.2 (23 Jan 2025 04:45)  HR: 70 (23 Jan 2025 10:18) (70 - 72)  BP: 100/65 (23 Jan 2025 10:18) (100/65 - 124/64)  BP(mean): --  RR: 18 (23 Jan 2025 10:18) (18 - 18)  SpO2: 98% (23 Jan 2025 10:18) (92% - 100%)    Parameters below as of 23 Jan 2025 04:45  Patient On (Oxygen Delivery Method): nasal cannula  O2 Flow (L/min): 2    I&O's Summary    22 Jan 2025 07:01  -  23 Jan 2025 07:00  --------------------------------------------------------  IN: 0 mL / OUT: 950 mL / NET: -950 mL        Conversant, in no apparent distress  Normal respiratory effort, lungs clear bilaterally  Heart RRR with no murmur, mild peripheral edema    LABS                        8.8    9.75  )-----------( 134      ( 23 Jan 2025 08:10 )             31.5     01-23    139  |  91[L]  |  59[H]  ----------------------------<  175[H]  3.4[L]   |  43[H]  |  2.52[H]    Ca    9.0      23 Jan 2025 08:10  Mg     2.0     01-22        Assessment   Fluid overload   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

## 2025-01-23 NOTE — PHYSICAL THERAPY INITIAL EVALUATION ADULT - GENERAL OBSERVATIONS, REHAB EVAL
Pt was seen in semi-supine c O2a at 2l/min  through nasal cannula, L lateral lower leg dressing C/D/I, alert and Ox4. Pt c/o weakness but was agreeable for P.T. eval. Swelling was noted in LUE due to DVT. BP was taken on RUE and was stable. Pt had O2 donned throughout P.T. intervention.

## 2025-01-23 NOTE — PHYSICAL THERAPY INITIAL EVALUATION ADULT - TRANSFER TRAINING, PT EVAL
To be able to perform transfers Independently, including bed to chair, chair to bed and chair to chair In order to facilitate safety with appropriate technique in 8 weeks.

## 2025-01-23 NOTE — PROGRESS NOTE ADULT - SUBJECTIVE AND OBJECTIVE BOX
Patient is a 79y old  Male who presents with a chief complaint of Acute on chronic combined HFpEF and HFrEF exacerbation, acute blood loss anemia secondary to suspected upper GI bleed (23 Jan 2025 12:58)    INTERVAL HPI/OVERNIGHT EVENTS: Patients seen and examined at bedside this morning. No acute events overnight. Pt reports SOB improving.    MEDICATIONS  (STANDING):  acetaZOLAMIDE Injectable 250 milliGRAM(s) IV Push every 12 hours  atorvastatin 20 milliGRAM(s) Oral at bedtime  dextrose 5%. 1000 milliLiter(s) (50 mL/Hr) IV Continuous <Continuous>  dextrose 5%. 1000 milliLiter(s) (100 mL/Hr) IV Continuous <Continuous>  dextrose 50% Injectable 25 Gram(s) IV Push once  dextrose 50% Injectable 12.5 Gram(s) IV Push once  dextrose 50% Injectable 25 Gram(s) IV Push once  fluticasone propionate/ salmeterol 250-50 MICROgram(s) Diskus 1 Dose(s) Inhalation two times a day  glucagon  Injectable 1 milliGRAM(s) IntraMuscular once  influenza  Vaccine (HIGH DOSE) 0.5 milliLiter(s) IntraMuscular once  insulin glargine Injectable (LANTUS) 22 Unit(s) SubCutaneous at bedtime  insulin lispro (ADMELOG) corrective regimen sliding scale   SubCutaneous three times a day before meals  metoprolol succinate ER 25 milliGRAM(s) Oral daily  pantoprazole    Tablet 40 milliGRAM(s) Oral two times a day  rivaroxaban 15 milliGRAM(s) Oral with dinner  tamsulosin 0.4 milliGRAM(s) Oral at bedtime  tiotropium 2.5 MICROgram(s) Inhaler 2 Puff(s) Inhalation daily    MEDICATIONS  (PRN):  acetaminophen     Tablet .. 650 milliGRAM(s) Oral every 6 hours PRN Temp greater or equal to 38C (100.4F), Mild Pain (1 - 3)  albuterol/ipratropium for Nebulization.. 3 milliLiter(s) Inhalation every 6 hours PRN -for shortness of breath and/or wheezing  aluminum hydroxide/magnesium hydroxide/simethicone Suspension 30 milliLiter(s) Oral every 4 hours PRN Dyspepsia  dextrose Oral Gel 15 Gram(s) Oral once PRN Blood Glucose LESS THAN 70 milliGRAM(s)/deciliter  ondansetron Injectable 4 milliGRAM(s) IV Push every 8 hours PRN Nausea and/or Vomiting    Allergies    No Known Allergies    Intolerances      REVIEW OF SYSTEMS:  All other systems reviewed and are negative    Vital Signs Last 24 Hrs  T(C): 36.7 (23 Jan 2025 10:18), Max: 36.8 (23 Jan 2025 04:45)  T(F): 98.1 (23 Jan 2025 10:18), Max: 98.2 (23 Jan 2025 04:45)  HR: 70 (23 Jan 2025 10:18) (70 - 72)  BP: 100/65 (23 Jan 2025 10:18) (100/65 - 124/64)  BP(mean): --  RR: 18 (23 Jan 2025 10:18) (18 - 18)  SpO2: 98% (23 Jan 2025 10:18) (96% - 100%)    Parameters below as of 23 Jan 2025 04:45  Patient On (Oxygen Delivery Method): nasal cannula  O2 Flow (L/min): 2    Daily     Daily   I&O's Summary    22 Jan 2025 07:01  -  23 Jan 2025 07:00  --------------------------------------------------------  IN: 0 mL / OUT: 950 mL / NET: -950 mL      CAPILLARY BLOOD GLUCOSE      POCT Blood Glucose.: 151 mg/dL (23 Jan 2025 10:52)  POCT Blood Glucose.: 194 mg/dL (23 Jan 2025 07:59)  POCT Blood Glucose.: 221 mg/dL (22 Jan 2025 21:06)  POCT Blood Glucose.: 173 mg/dL (22 Jan 2025 16:18)    PHYSICAL EXAM:  GENERAL:  NAD Alert and Oriented x 3   HEENT: NCAT  CARDIOVASCULAR:  S1, S2  LUNGS: bibasilar crackles.   ABDOMEN:  soft, (-) tenderness, (-) distension, (+) bowel sounds, (-) guarding, (-) rebound (-) rigidity  EXTREMITIES:  no cyanosis / clubbing.  2+ edema.       Labs                          8.8    9.75  )-----------( 134      ( 23 Jan 2025 08:10 )             31.5     01-23    139  |  91[L]  |  59[H]  ----------------------------<  175[H]  3.4[L]   |  43[H]  |  2.52[H]    Ca    9.0      23 Jan 2025 08:10  Mg     2.0     01-22      PTT - ( 22 Jan 2025 22:50 )  PTT:41.7 sec      Urinalysis Basic - ( 23 Jan 2025 08:10 )    Color: x / Appearance: x / SG: x / pH: x  Gluc: 175 mg/dL / Ketone: x  / Bili: x / Urobili: x   Blood: x / Protein: x / Nitrite: x   Leuk Esterase: x / RBC: x / WBC x   Sq Epi: x / Non Sq Epi: x / Bacteria: x                      Radiology and Imaging reviewed.

## 2025-01-23 NOTE — PHYSICAL THERAPY INITIAL EVALUATION ADULT - ADDITIONAL COMMENTS
As per pt : There are several steps, c hand rail, at the entry of the house and no steps to negotiate at home. Pt owns a [employer] and home O2.  Pt ambulated for short distance, w/o assist device, but  pt needed assist in ADL prior to admission.

## 2025-01-23 NOTE — PHYSICAL THERAPY INITIAL EVALUATION ADULT - GAIT DEVIATIONS NOTED, PT EVAL
decreased faiza/increased time in double stance/decreased velocity of limb motion/decreased step length/decreased stride length/decreased swing-to-stance ratio/decreased weight-shifting ability

## 2025-01-23 NOTE — PROGRESS NOTE ADULT - ASSESSMENT
79M with CAD/CABG, HFrEF 25%, ICD, afib, copd on home o2, htn hld dm p/w adhf, acute anemia requiring PRBC transfusion     ADHF    s/p bumex drip-> lasix 80 iv-> off now 2/2 contraction alkalosis, renal recs appreciated, acetazolamide dosing  monitor renal function, electrolytes, bicarb   aim to keep k ~4.5 with contraction alkalosis   cont on Toprol 25/d  add further GDMT for cardiomyopathy as BP and renal function allows  +Lt UE DVT - on AC,  iv heparin now switched to Xarelto, home med  monitor h/h closely  COPD management   increase activity as tolerated, PT eval

## 2025-01-23 NOTE — PHYSICAL THERAPY INITIAL EVALUATION ADULT - BALANCE TRAINING, PT EVAL
Pt will increase static/dynamic sitting balance to good and static/dynamic standing balance to good to perform all functional mobility without LOB, by 8weeks.

## 2025-01-23 NOTE — PROGRESS NOTE ADULT - ASSESSMENT
Arun Solares is an 79 year old male with PMHx of HTN, HLD, IDDM2, CAD (s/p CABG), pAF (on rivaroxaban), chronic combined HFpEF and HFrEF (LV EF 25-30% and grade III diastolic dysfunction on echo 8/9/24, s/p AICD placement), hx of severe aortic stenosis (s/p TAVR), chronic hypoxic respiratory failure secondary to COPD (baseline on 3-4L NC at home), CKD stage IV, and BPH who presented to the ED on 1/10/25 for complaints of shortness of breath and admitted for acute on chronic combined HFpEF and HFrEF exacerbation and acute blood loss anemia secondary to suspected upper GI bleed.    # Acute on chronic combined HFpEF and HFrEF exacerbation  -Likely due to non compliance with medications particularly diuretics as per wife.  Sleeps on recliner due to inability to lay flat, denies paroxysmal nocturnal dyspnea.  Pro-BNP 4773 on admission, Prior TTE (on 8/8/24) with LV EF 25-30%, global LV hypokinesis, grade III diastolic dysfunction, moderate pHTN, mild W[place], moderate TR   -strict Is and Os, daily weights, low salt diet, 2L fluid restriction  -Continue GDMT with ACEi and beta-blockade  -Some response to aggressive diuresis on Lasix gtt.  Now on IV  Cardiology following.    (1/22) Diuresis well. C/W lasix. Transition heparin to xarelto.   PT eval pending  (1/23) Clinically improving with oxygen titration.     # Hypokalemia - in setting of Lasix gtt.  Supplement K.  Repeat K level stable.     # LUE DVT - noted on LUE U/S.  Hgb stable.  Start Heparin gtt.  Risks, benefits and alternatives to AC were discussed with wife 1/18.  IN agreement with starting AC.  Consider transition to DOAC if H/H remains stable.     # Acute blood loss anemia secondary to suspected upper GI bleed Differential includes PUD (given recent NSAID use) Reports increased shortness of breath x 2 months, likely component of SOB due to anemia given severity Admits to two black bowel movements, most recent colonoscopy > 10 years ago and had polyps removed at that time Hgb 6.5 on admission, baseline ~14? but most recent hgb 10.4 (on 11/25/24)>>8.5 s/p 2PRBCs in ED.  H/H stable.   Hgb has been stable. Gi recommendations appreciated - pt does not appear to be actively bleeding, defers endoscopic evaluation for now unless bleeding recurs.    Drop in H/H noted.  Repeat H/H stable.     # HTN: PTA metoprolol succinate 50 mg, ramipril 2.5 mg reordered as lisinopril 10 mg given ramipril not on formulary  # HLD: PTA simvastatin 40 mg reordered as atorvastatin 20 mg given simvastatin not on formulary  # IDDM2 with hyperglycemia: last known A1c 9.4 (on 11/25/24), blood glucose 256 on admission, POC qac and qhs, PTA Lantus 15 U qhs, low dose SSI qac started, blood glucose goal < 180  # CAD (s/p CABG): PTA ASA 81 mg held due to suspected GI bleed  # pAF (on rivaroxaban): Rate control.  AC as above.    # Chronic hypoxic respiratory failure secondary to COPD (baseline on 3-4L NC at home): PTA tiotropium, symbicort reordered as advair, duonebs PRN  # CKD stage IV: BUN 61 and Cr 2.71 on admission, baseline Cr appears to fluctuate but Cr 2.92 (on 11/25/24), avoid nephrotoxins, renally dose meds, encourage PO hydration, pending official nephrology recs  # BPH: PTA tamsulosin 0.4 mg  # Inpatient DVT Prophylaxis - on AC  Plan of care d/w wife at bedside.

## 2025-01-23 NOTE — PHYSICAL THERAPY INITIAL EVALUATION ADULT - PATIENT/FAMILY/SIGNIFICANT OTHER GOALS STATEMENT, PT EVAL
Impression: S/P Cataract Extraction by phacoemulsification with IOL placement OD - 1 Day. Presence of intraocular lens  Z96.1. Plan: -0.25 aim. Use ATs TID-QID OU.  
RTC for final PO as scheduled (wants to get glasses soon at [facility], discussed vision could still change) To feel better.

## 2025-01-23 NOTE — PROGRESS NOTE ADULT - SUBJECTIVE AND OBJECTIVE BOX
Patient is a 79y old  Male who presents with sob and acute anemia     PAST MEDICAL & SURGICAL HISTORY:  HTN (hypertension)    COPD (chronic obstructive pulmonary disease)    CAD    HLD (hyperlipidemia)    Insulin dependent type 2 diabetes mellitus    Chronic hypoxic respiratory failure, on home oxygen therapy    Stage 4 chronic kidney disease    Chronic combined systolic and diastolic heart failure    Unspecified atrial fibrillation    S/P CABG x 3  cabg3  in 2003    S/P hernia repair  hernia [id number]    S/P implantation of automatic cardioverter/defibrillator (AICD)    INTERVAL HISTORY: in no acute distress, dyspnea improving  	  MEDICATIONS:  MEDICATIONS  (STANDING):  acetaZOLAMIDE Injectable 250 milliGRAM(s) IV Push every 12 hours x 2 doses   atorvastatin 20 milliGRAM(s) Oral at bedtime  fluticasone propionate/ salmeterol 250-50 MICROgram(s) Diskus 1 Dose(s) Inhalation two times a day  insulin glargine Injectable (LANTUS) 22 Unit(s) SubCutaneous at bedtime  insulin lispro (ADMELOG) corrective regimen sliding scale   SubCutaneous three times a day before meals  metoprolol succinate ER 25 milliGRAM(s) Oral daily  pantoprazole    Tablet 40 milliGRAM(s) Oral two times a day  potassium chloride    Tablet ER 40 milliEquivalent(s) Oral once  rivaroxaban 15 milliGRAM(s) Oral with dinner  tamsulosin 0.4 milliGRAM(s) Oral at bedtime  tiotropium 2.5 MICROgram(s) Inhaler 2 Puff(s) Inhalation daily    MEDICATIONS  (PRN):  acetaminophen     Tablet .. 650 milliGRAM(s) Oral every 6 hours PRN Temp greater or equal to 38C (100.4F), Mild Pain (1 - 3)  albuterol/ipratropium for Nebulization.. 3 milliLiter(s) Inhalation every 6 hours PRN -for shortness of breath and/or wheezing  aluminum hydroxide/magnesium hydroxide/simethicone Suspension 30 milliLiter(s) Oral every 4 hours PRN Dyspepsia  dextrose Oral Gel 15 Gram(s) Oral once PRN Blood Glucose LESS THAN 70 milliGRAM(s)/deciliter  ondansetron Injectable 4 milliGRAM(s) IV Push every 8 hours PRN Nausea and/or Vomiting    Vitals:  T(F): 98.1 (01-23-25 @ 10:18), Max: 98.2 (01-23-25 @ 04:45)  HR: 70 (01-23-25 @ 10:18) (70 - 72)  BP: 100/65 (01-23-25 @ 10:18) (100/65 - 124/64)  RR: 18 (01-23-25 @ 10:18) (18 - 18)  SpO2: 98% (01-23-25 @ 10:18) (96% - 100%)    01-22 @ 07:01  -  01-23 @ 07:00  --------------------------------------------------------  IN:  Total IN: 0 mL    OUT:    Voided (mL): 950 mL  Total OUT: 950 mL    Total NET: -950 mL    PHYSICAL EXAM:  Neuro: Awake, responsive  CV: S1 S2 RRR  Lungs: few rales to bases  GI: Soft, BS +, ND, NT  Extremities: LE edema    TELEMETRY: paced, PVCs    RADIOLOGY: < from: Xray Chest 1 View-PORTABLE IMMEDIATE (Xray Chest 1 View-PORTABLE IMMEDIATE .) (01.10.25 @ 17:13) >  IMPRESSION: Stable cardiac findings. Fluid is a left base again seen.   Overall chest has improved from prior.    < end of copied text >    DIAGNOSTIC TESTING:    [x ] Echocardiogram: < from: TTE Echo Complete w/ Contrast w/ Doppler (08.08.24 @ 13:27) >   1. Left ventricular cavity is normal in size. Left ventricular systolic   function is severely decreased with an ejection fraction visually   estimated at 25 to 30 %. Global left ventricular hypokinesis.   2. There is severe (grade 3) left ventricular diastolic dysfunction,   with elevated left ventricular filling pressure.   3. The right ventricle is not well visualized.   4. Device lead is visualized in the right heart.   5. No significant valvular disease.   6. No pericardial effusion seen.   7. Estimated pulmonary artery systolic pressure is 53 mmHg, consistent   with moderate pulmonary hypertension.   8. Left and moderate left pleural effusion noted.   9. Mild pulmonic regurgitation.  10. Moderate tricuspid regurgitation.    < end of copied text >     < from: NM Pharm Stress Test, Dual Isotope (03.17.21 @ 12:40) >  1. There was scintigraphic evidence for a myocardial infarct in the RCA territory with no significant ischemia.    2. There is severely abnormal left ventricular contractility, globally diminished calculated ejection fraction and no wall motion abnormlaities. Overall post stress ejection fraction was 21%    < end of copied text >    LABS:	 	    23 Jan 2025 08:10    139    |  91     |  59     ----------------------------<  175    3.4     |  43     |  2.52   22 Jan 2025 06:31    139    |  89     |  60     ----------------------------<  86     3.3     |  >45    |  2.31   21 Jan 2025 07:30    138    |  92     |  61     ----------------------------<  74     4.1     |  43     |  2.49     Ca    9.0        23 Jan 2025 08:10  Mg     2.0       22 Jan 2025 06:31                        8.8    9.75  )-----------( 134      ( 23 Jan 2025 08:10 )             31.5 ,                       8.7    9.98  )-----------( 133      ( 22 Jan 2025 06:31 )             31.1 ,                       8.8    10.23 )-----------( 127      ( 21 Jan 2025 07:30 )             31.7 ,                       8.2    x     )-----------( x        ( 20 Jan 2025 15:45 )             28.9   pro-BNP: Pro-Brain Natriuretic Peptide: 4647 pg/mL (01.22.25 @ 06:31)    TSH: Thyroid Stimulating Hormone, Serum: 2.850 uIU/mL (01-22 @ 06:31)

## 2025-01-24 LAB
ANION GAP SERPL CALC-SCNC: 7 MMOL/L — SIGNIFICANT CHANGE UP (ref 5–17)
BUN SERPL-MCNC: 59 MG/DL — HIGH (ref 7–23)
CALCIUM SERPL-MCNC: 9.3 MG/DL — SIGNIFICANT CHANGE UP (ref 8.5–10.1)
CHLORIDE SERPL-SCNC: 92 MMOL/L — LOW (ref 96–108)
CO2 SERPL-SCNC: 41 MMOL/L — HIGH (ref 22–31)
CREAT SERPL-MCNC: 2.79 MG/DL — HIGH (ref 0.5–1.3)
EGFR: 22 ML/MIN/1.73M2 — LOW
GLUCOSE BLDC GLUCOMTR-MCNC: 172 MG/DL — HIGH (ref 70–99)
GLUCOSE BLDC GLUCOMTR-MCNC: 242 MG/DL — HIGH (ref 70–99)
GLUCOSE BLDC GLUCOMTR-MCNC: 245 MG/DL — HIGH (ref 70–99)
GLUCOSE BLDC GLUCOMTR-MCNC: 257 MG/DL — HIGH (ref 70–99)
GLUCOSE SERPL-MCNC: 173 MG/DL — HIGH (ref 70–99)
HCT VFR BLD CALC: 32 % — LOW (ref 39–50)
HGB BLD-MCNC: 8.9 G/DL — LOW (ref 13–17)
MCHC RBC-ENTMCNC: 27.1 PG — SIGNIFICANT CHANGE UP (ref 27–34)
MCHC RBC-ENTMCNC: 27.8 G/DL — LOW (ref 32–36)
MCV RBC AUTO: 97.3 FL — SIGNIFICANT CHANGE UP (ref 80–100)
NRBC # BLD: 0 /100 WBCS — SIGNIFICANT CHANGE UP (ref 0–0)
NRBC BLD-RTO: 0 /100 WBCS — SIGNIFICANT CHANGE UP (ref 0–0)
PLATELET # BLD AUTO: 139 K/UL — LOW (ref 150–400)
POTASSIUM SERPL-MCNC: 3.6 MMOL/L — SIGNIFICANT CHANGE UP (ref 3.5–5.3)
POTASSIUM SERPL-SCNC: 3.6 MMOL/L — SIGNIFICANT CHANGE UP (ref 3.5–5.3)
RBC # BLD: 3.29 M/UL — LOW (ref 4.2–5.8)
RBC # FLD: 28.6 % — HIGH (ref 10.3–14.5)
SODIUM SERPL-SCNC: 140 MMOL/L — SIGNIFICANT CHANGE UP (ref 135–145)
WBC # BLD: 10.37 K/UL — SIGNIFICANT CHANGE UP (ref 3.8–10.5)
WBC # FLD AUTO: 10.37 K/UL — SIGNIFICANT CHANGE UP (ref 3.8–10.5)

## 2025-01-24 PROCEDURE — 99232 SBSQ HOSP IP/OBS MODERATE 35: CPT

## 2025-01-24 PROCEDURE — 99233 SBSQ HOSP IP/OBS HIGH 50: CPT

## 2025-01-24 RX ORDER — POTASSIUM CHLORIDE 750 MG/1
40 TABLET, EXTENDED RELEASE ORAL ONCE
Refills: 0 | Status: COMPLETED | OUTPATIENT
Start: 2025-01-24 | End: 2025-01-24

## 2025-01-24 RX ADMIN — FLUTICASONE PROPIONATE AND SALMETEROL 1 DOSE(S): 113; 14 POWDER, METERED RESPIRATORY (INHALATION) at 17:20

## 2025-01-24 RX ADMIN — FLUTICASONE PROPIONATE AND SALMETEROL 1 DOSE(S): 113; 14 POWDER, METERED RESPIRATORY (INHALATION) at 05:11

## 2025-01-24 RX ADMIN — RIVAROXABAN 15 MILLIGRAM(S): 20 TABLET, FILM COATED ORAL at 17:19

## 2025-01-24 RX ADMIN — POTASSIUM CHLORIDE 40 MILLIEQUIVALENT(S): 750 TABLET, EXTENDED RELEASE ORAL at 13:01

## 2025-01-24 RX ADMIN — Medication 25 MILLIGRAM(S): at 05:11

## 2025-01-24 RX ADMIN — PANTOPRAZOLE 40 MILLIGRAM(S): 20 TABLET, DELAYED RELEASE ORAL at 05:25

## 2025-01-24 RX ADMIN — IPRATROPIUM BROMIDE AND ALBUTEROL SULFATE 3 MILLILITER(S): .5; 2.5 SOLUTION RESPIRATORY (INHALATION) at 15:00

## 2025-01-24 RX ADMIN — ATORVASTATIN CALCIUM 20 MILLIGRAM(S): 80 TABLET, FILM COATED ORAL at 21:49

## 2025-01-24 RX ADMIN — Medication 1: at 08:52

## 2025-01-24 RX ADMIN — TIOTROPIUM BROMIDE MONOHYDRATE 2 PUFF(S): 18 CAPSULE ORAL; RESPIRATORY (INHALATION) at 11:25

## 2025-01-24 RX ADMIN — Medication 2: at 17:19

## 2025-01-24 RX ADMIN — PANTOPRAZOLE 40 MILLIGRAM(S): 20 TABLET, DELAYED RELEASE ORAL at 17:19

## 2025-01-24 RX ADMIN — Medication 250 MILLIGRAM(S): at 05:11

## 2025-01-24 RX ADMIN — TAMSULOSIN HYDROCHLORIDE 0.4 MILLIGRAM(S): 0.4 CAPSULE ORAL at 21:48

## 2025-01-24 RX ADMIN — INSULIN GLARGINE-YFGN 22 UNIT(S): 100 INJECTION, SOLUTION SUBCUTANEOUS at 21:49

## 2025-01-24 RX ADMIN — Medication 3: at 11:23

## 2025-01-24 NOTE — PROGRESS NOTE ADULT - SUBJECTIVE AND OBJECTIVE BOX
Patient is a 79y old  Male who presents with a chief complaint of sob    PAST MEDICAL & SURGICAL HISTORY:  HTN (hypertension)    COPD (chronic obstructive pulmonary disease)    HLD (hyperlipidemia)    Insulin dependent type 2 diabetes mellitus    Chronic hypoxic respiratory failure, on home oxygen therapy    Stage 4 chronic kidney disease    Chronic combined systolic and diastolic heart failure    Unspecified atrial fibrillation    S/P CABG x 3    S/P hernia repair    S/P implantation of automatic cardioverter/defibrillator (AICD)    INTERVAL HISTORY:  	  MEDICATIONS:  MEDICATIONS  (STANDING):  atorvastatin 20 milliGRAM(s) Oral at bedtime  fluticasone propionate/ salmeterol 250-50 MICROgram(s) Diskus 1 Dose(s) Inhalation two times a day  insulin glargine Injectable (LANTUS) 22 Unit(s) SubCutaneous at bedtime  insulin lispro (ADMELOG) corrective regimen sliding scale   SubCutaneous three times a day before meals  metoprolol succinate ER 25 milliGRAM(s) Oral daily  pantoprazole    Tablet 40 milliGRAM(s) Oral two times a day  rivaroxaban 15 milliGRAM(s) Oral with dinner  tamsulosin 0.4 milliGRAM(s) Oral at bedtime  tiotropium 2.5 MICROgram(s) Inhaler 2 Puff(s) Inhalation daily    MEDICATIONS  (PRN):  acetaminophen     Tablet .. 650 milliGRAM(s) Oral every 6 hours PRN Temp greater or equal to 38C (100.4F), Mild Pain (1 - 3)  albuterol/ipratropium for Nebulization.. 3 milliLiter(s) Inhalation every 6 hours PRN -for shortness of breath and/or wheezing  aluminum hydroxide/magnesium hydroxide/simethicone Suspension 30 milliLiter(s) Oral every 4 hours PRN Dyspepsia  dextrose Oral Gel 15 Gram(s) Oral once PRN Blood Glucose LESS THAN 70 milliGRAM(s)/deciliter  ondansetron Injectable 4 milliGRAM(s) IV Push every 8 hours PRN Nausea and/or Vomiting    Vitals:  T(F): 97.2 (01-24-25 @ 11:05), Max: 98.5 (01-23-25 @ 23:22)  HR: 71 (01-24-25 @ 11:05) (65 - 72)  BP: 108/66 (01-24-25 @ 11:05) (102/66 - 110/67)  RR: 18 (01-24-25 @ 11:05) (17 - 18)  SpO2: 100% (01-24-25 @ 11:05) (95% - 100%)    01-23 @ 07:01  -  01-24 @ 07:00  --------------------------------------------------------  IN:  Total IN: 0 mL    OUT:    Voided (mL): 400 mL  Total OUT: 400 mL    Total NET: -400 mL    PHYSICAL EXAM:  Neuro: Awake, responsive  CV: S1 S2 RRR  Lungs: few rales to bases  GI: Softly distended, BS +, NT , +abd wall edema   Extremities: LE edema    RADIOLOGY: < from: US Duplex Venous Upper Ext Ltd, Left (01.11.25 @ 02:59) >  Positive for thrombus within the left brachial and basilic vein.    < end of copied text >    DIAGNOSTIC TESTING:    [x ] Echocardiogram: < from: TTE Echo Complete w/ Contrast w/ Doppler (08.08.24 @ 13:27) >     1. Left ventricular cavity is normal in size. Left ventricular systolic   function is severely decreased with an ejection fraction visually   estimated at 25 to 30 %. Global left ventricular hypokinesis.   2. There is severe (grade 3) left ventricular diastolic dysfunction,   with elevated left ventricular filling pressure.   3. The right ventricle is not well visualized.   4. Device lead is visualized in the right heart.   5. No significant valvular disease.   6. No pericardial effusion seen.   7. Estimated pulmonary artery systolic pressure is 53 mmHg, consistent   with moderate pulmonary hypertension.   8. Left and moderate left pleural effusion noted.   9. Mild pulmonic regurgitation.  10. Moderate tricuspid regurgitation.    < end of copied text >     from: NM Pharm Stress Test, Dual Isotope (03.17.21 @ 12:40) >  1. There was scintigraphic evidence for a myocardial infarct in the RCA territory with no significant ischemia.    2. There is severely abnormal left ventricular contractility, globally diminished calculated ejection fraction and no wall motion abnormlaities. Overall post stress ejection fraction was 21%    < end of copied text >    LABS:	 	    24 Jan 2025 08:27    140    |  92     |  59     ----------------------------<  173    3.6     |  41     |  2.79   23 Jan 2025 08:10    139    |  91     |  59     ----------------------------<  175    3.4     |  43     |  2.52   22 Jan 2025 06:31    139    |  89     |  60     ----------------------------<  86     3.3     |  >45    |  2.31     Ca    9.3        24 Jan 2025 08:27                       8.9    10.37 )-----------( 139      ( 24 Jan 2025 08:27 )             32.0 ,                       8.8    9.75  )-----------( 134      ( 23 Jan 2025 08:10 )             31.5 ,                       8.7    9.98  )-----------( 133      ( 22 Jan 2025 06:31 )             31.1   pro-BNP: Pro-Brain Natriuretic Peptide: 4773 pg/mL (01.10.25 @ 16:20)    TSH: Thyroid Stimulating Hormone, Serum: 2.850 uIU/mL (01-22 @ 06:31)

## 2025-01-24 NOTE — PROGRESS NOTE ADULT - SUBJECTIVE AND OBJECTIVE BOX
[facility] NEPHROLOGY SERVICES, [facility]  NEPHROLOGY AND HYPERTENSION  [address]  [address]  [place] [address]  [phone]    MD DANIEL GARRETT MD YELENA ROSENBERG, MD BINNY KOSHY, MD CHRISTOPHER CAPUTO, MD EDWARD BOVER, MD          Patient events noted  No distress    MEDICATIONS  (STANDING):  atorvastatin 20 milliGRAM(s) Oral at bedtime  dextrose 5%. 1000 milliLiter(s) (50 mL/Hr) IV Continuous <Continuous>  dextrose 5%. 1000 milliLiter(s) (100 mL/Hr) IV Continuous <Continuous>  dextrose 50% Injectable 25 Gram(s) IV Push once  dextrose 50% Injectable 12.5 Gram(s) IV Push once  dextrose 50% Injectable 25 Gram(s) IV Push once  fluticasone propionate/ salmeterol 250-50 MICROgram(s) Diskus 1 Dose(s) Inhalation two times a day  glucagon  Injectable 1 milliGRAM(s) IntraMuscular once  influenza  Vaccine (HIGH DOSE) 0.5 milliLiter(s) IntraMuscular once  insulin glargine Injectable (LANTUS) 22 Unit(s) SubCutaneous at bedtime  insulin lispro (ADMELOG) corrective regimen sliding scale   SubCutaneous three times a day before meals  metoprolol succinate ER 25 milliGRAM(s) Oral daily  pantoprazole    Tablet 40 milliGRAM(s) Oral two times a day  rivaroxaban 15 milliGRAM(s) Oral with dinner  tamsulosin 0.4 milliGRAM(s) Oral at bedtime  tiotropium 2.5 MICROgram(s) Inhaler 2 Puff(s) Inhalation daily    MEDICATIONS  (PRN):  acetaminophen     Tablet .. 650 milliGRAM(s) Oral every 6 hours PRN Temp greater or equal to 38C (100.4F), Mild Pain (1 - 3)  albuterol/ipratropium for Nebulization.. 3 milliLiter(s) Inhalation every 6 hours PRN -for shortness of breath and/or wheezing  aluminum hydroxide/magnesium hydroxide/simethicone Suspension 30 milliLiter(s) Oral every 4 hours PRN Dyspepsia  dextrose Oral Gel 15 Gram(s) Oral once PRN Blood Glucose LESS THAN 70 milliGRAM(s)/deciliter  ondansetron Injectable 4 milliGRAM(s) IV Push every 8 hours PRN Nausea and/or Vomiting      01-23-25 @ 07:01  -  25 @ 07:00  --------------------------------------------------------  IN: 0 mL / OUT: 400 mL / NET: -400 mL      PHYSICAL EXAM:      T(C): 36.4 (25 @ 16:07), Max: 36.9 (25 @ 23:22)  HR: 70 (25 @ 16:07) (65 - 71)  BP: 112/67 (25 @ 16:07) (108/66 - 112/67)  RR: 18 (25 @ 16:07) (17 - 18)  SpO2: 97% (25 @ 16:07) (95% - 100%)  Wt(kg): --  Lungs clear  Heart S1S2  Abd soft NT ND  Extremities:   1-2 edema    Daily Weight Trend:   Weight in k.9 (25 @ 04:51)  Weight in k.4 (25 @ 04:37)  Weight in k.9 (25 @ 04:51)                                8.9    10.37 )-----------( 139      ( 2025 08:27 )             32.0         140  |  92[L]  |  59[H]  ----------------------------<  173[H]  3.6   |  41[H]  |  2.79[H]    Ca    9.3      2025 08:27          Creatinine Trend: 2.79<--, 2.52<--, 2.31<--, 2.49<--, 2.70<--, 2.35<--          Assessment   Fluid overload   DENISHA CKD 3-4   Contraction alkalosis     Plan:  Continue to hold lasix  Post Diamox 250 IV q 12 x 2  Follow [facility]  Jon Rowe MD

## 2025-01-24 NOTE — PROGRESS NOTE ADULT - ASSESSMENT
79M with CAD/CABG, HFrEF 25%, ICD, afib, copd on home o2, htn hld dm p/w adhf, acute anemia requiring PRBC transfusion     ADHF    s/p bumex drip-> lasix 80 iv-> off now 2/2 contraction alkalosis, renal recs appreciated, acetazolamide dosing  monitor renal function, electrolytes, bicarb   aim to keep k ~4.5 with contraction alkalosis   cont on Toprol 25/d  add further GDMT for cardiomyopathy as BP and renal function allows  +Lt UE DVT - on AC,  iv heparin now switched to Xarelto, home med  monitor h/h closely  COPD management   increase activity as tolerated, PT

## 2025-01-24 NOTE — PROGRESS NOTE ADULT - SUBJECTIVE AND OBJECTIVE BOX
Patient is a 79y old  Male who presents with a chief complaint of Acute on chronic combined HFpEF and HFrEF exacerbation, acute blood loss anemia secondary to suspected upper GI bleed (23 Jan 2025 12:58)    INTERVAL HPI/OVERNIGHT EVENTS: Patients seen and examined at bedside this morning. No acute events overnight. Pt reports SOB improving.    MEDICATIONS  (STANDING):  acetaZOLAMIDE Injectable 250 milliGRAM(s) IV Push every 12 hours  atorvastatin 20 milliGRAM(s) Oral at bedtime  dextrose 5%. 1000 milliLiter(s) (50 mL/Hr) IV Continuous <Continuous>  dextrose 5%. 1000 milliLiter(s) (100 mL/Hr) IV Continuous <Continuous>  dextrose 50% Injectable 25 Gram(s) IV Push once  dextrose 50% Injectable 12.5 Gram(s) IV Push once  dextrose 50% Injectable 25 Gram(s) IV Push once  fluticasone propionate/ salmeterol 250-50 MICROgram(s) Diskus 1 Dose(s) Inhalation two times a day  glucagon  Injectable 1 milliGRAM(s) IntraMuscular once  influenza  Vaccine (HIGH DOSE) 0.5 milliLiter(s) IntraMuscular once  insulin glargine Injectable (LANTUS) 22 Unit(s) SubCutaneous at bedtime  insulin lispro (ADMELOG) corrective regimen sliding scale   SubCutaneous three times a day before meals  metoprolol succinate ER 25 milliGRAM(s) Oral daily  pantoprazole    Tablet 40 milliGRAM(s) Oral two times a day  rivaroxaban 15 milliGRAM(s) Oral with dinner  tamsulosin 0.4 milliGRAM(s) Oral at bedtime  tiotropium 2.5 MICROgram(s) Inhaler 2 Puff(s) Inhalation daily    MEDICATIONS  (PRN):  acetaminophen     Tablet .. 650 milliGRAM(s) Oral every 6 hours PRN Temp greater or equal to 38C (100.4F), Mild Pain (1 - 3)  albuterol/ipratropium for Nebulization.. 3 milliLiter(s) Inhalation every 6 hours PRN -for shortness of breath and/or wheezing  aluminum hydroxide/magnesium hydroxide/simethicone Suspension 30 milliLiter(s) Oral every 4 hours PRN Dyspepsia  dextrose Oral Gel 15 Gram(s) Oral once PRN Blood Glucose LESS THAN 70 milliGRAM(s)/deciliter  ondansetron Injectable 4 milliGRAM(s) IV Push every 8 hours PRN Nausea and/or Vomiting    Allergies    No Known Allergies    Intolerances      REVIEW OF SYSTEMS:  All other systems reviewed and are negative    Vital Signs Last 24 Hrs  T(C): 36.7 (23 Jan 2025 10:18), Max: 36.8 (23 Jan 2025 04:45)  T(F): 98.1 (23 Jan 2025 10:18), Max: 98.2 (23 Jan 2025 04:45)  HR: 70 (23 Jan 2025 10:18) (70 - 72)  BP: 100/65 (23 Jan 2025 10:18) (100/65 - 124/64)  BP(mean): --  RR: 18 (23 Jan 2025 10:18) (18 - 18)  SpO2: 98% (23 Jan 2025 10:18) (96% - 100%)    Parameters below as of 23 Jan 2025 04:45  Patient On (Oxygen Delivery Method): nasal cannula  O2 Flow (L/min): 2    Daily     Daily   I&O's Summary    22 Jan 2025 07:01  -  23 Jan 2025 07:00  --------------------------------------------------------  IN: 0 mL / OUT: 950 mL / NET: -950 mL      CAPILLARY BLOOD GLUCOSE      POCT Blood Glucose.: 151 mg/dL (23 Jan 2025 10:52)  POCT Blood Glucose.: 194 mg/dL (23 Jan 2025 07:59)  POCT Blood Glucose.: 221 mg/dL (22 Jan 2025 21:06)  POCT Blood Glucose.: 173 mg/dL (22 Jan 2025 16:18)    PHYSICAL EXAM:  GENERAL:  NAD Alert and Oriented x 3   HEENT: NCAT  CARDIOVASCULAR:  S1, S2  LUNGS: bibasilar crackles.   ABDOMEN:  soft, (-) tenderness, (-) distension, (+) bowel sounds, (-) guarding, (-) rebound (-) rigidity  EXTREMITIES:  no cyanosis / clubbing.  2+ edema.       Labs                          8.8    9.75  )-----------( 134      ( 23 Jan 2025 08:10 )             31.5     01-23    139  |  91[L]  |  59[H]  ----------------------------<  175[H]  3.4[L]   |  43[H]  |  2.52[H]    Ca    9.0      23 Jan 2025 08:10  Mg     2.0     01-22      PTT - ( 22 Jan 2025 22:50 )  PTT:41.7 sec      Urinalysis Basic - ( 23 Jan 2025 08:10 )    Color: x / Appearance: x / SG: x / pH: x  Gluc: 175 mg/dL / Ketone: x  / Bili: x / Urobili: x   Blood: x / Protein: x / Nitrite: x   Leuk Esterase: x / RBC: x / WBC x   Sq Epi: x / Non Sq Epi: x / Bacteria: x                      Radiology and Imaging reviewed. Patient is a 79y old  Male who presents with a chief complaint of Acute on chronic combined HFpEF and HFrEF exacerbation, acute blood loss anemia secondary to suspected upper GI bleed (23 Jan 2025 12:58)      Vital Signs Last 24 Hrs  T(C): 36.2 (24 Jan 2025 11:05), Max: 36.9 (23 Jan 2025 23:22)  T(F): 97.2 (24 Jan 2025 11:05), Max: 98.5 (23 Jan 2025 23:22)  HR: 71 (24 Jan 2025 11:05) (65 - 72)  BP: 108/66 (24 Jan 2025 11:05) (102/66 - 110/67)  RR: 18 (24 Jan 2025 11:05) (17 - 18)  SpO2: 100% (24 Jan 2025 11:05) (95% - 100%)    Parameters below as of 24 Jan 2025 11:05  Patient On (Oxygen Delivery Method): nasal cannula            Physical exam  GENERAL:  NAD Alert and Oriented x 3   HEENT: NCAT  CARDIOVASCULAR:  S1, S2  LUNGS: bibasilar crackles.   ABDOMEN:  soft, (-) tenderness, (-) distension, (+) bowel sounds, (-) guarding, (-) rebound (-) rigidity  EXTREMITIES:  no cyanosis / clubbing.  2+ edema.       Labs       LABS:  cret                        8.9    10.37 )-----------( 139      ( 24 Jan 2025 08:27 )             32.0     01-24    140  |  92[L]  |  59[H]  ----------------------------<  173[H]  3.6   |  41[H]  |  2.79[H]    Ca    9.3      24 Jan 2025 08:27      PTT - ( 22 Jan 2025 22:50 )  PTT:41.7 sec    Imaging reviewed                      Radiology and Imaging reviewed.

## 2025-01-24 NOTE — PROGRESS NOTE ADULT - ASSESSMENT
Arun Solares is an 79 year old male with PMHx of HTN, HLD, IDDM2, CAD (s/p CABG), pAF (on rivaroxaban), chronic combined HFpEF and HFrEF (LV EF 25-30% and grade III diastolic dysfunction on echo 8/9/24, s/p AICD placement), hx of severe aortic stenosis (s/p TAVR), chronic hypoxic respiratory failure secondary to COPD (baseline on 3-4L NC at home), CKD stage IV, and BPH who presented to the ED on 1/10/25 for complaints of shortness of breath and admitted for acute on chronic combined HFpEF and HFrEF exacerbation and acute blood loss anemia secondary to suspected upper GI bleed.    # Acute on chronic combined HFpEF and HFrEF exacerbation  -Likely due to non compliance with medications particularly diuretics as per wife.  Sleeps on recliner due to inability to lay flat, denies paroxysmal nocturnal dyspnea.  Pro-BNP 4773 on admission, Prior TTE (on 8/8/24) with LV EF 25-30%, global LV hypokinesis, grade III diastolic dysfunction, moderate pHTN, mild [place], moderate TR   -strict Is and Os, daily weights, low salt diet, 2L fluid restriction  -Continue GDMT with ACEi and beta-blockade  -Some response to aggressive diuresis on Lasix gtt.  Now on IV  Cardiology following.    (1/22) Diuresis well. C/W lasix. Transition heparin to xarelto.   PT eval pending  (1/23) Clinically improving with oxygen titration.     # Hypokalemia - in setting of Lasix gtt.  Supplement K.  Repeat K level stable.     # LUE DVT - noted on LUE U/S.  Hgb stable.  Start Heparin gtt.  Risks, benefits and alternatives to AC were discussed with wife 1/18.  IN agreement with starting AC.  Consider transition to DOAC if H/H remains stable.     # Acute blood loss anemia secondary to suspected upper GI bleed Differential includes PUD (given recent NSAID use) Reports increased shortness of breath x 2 months, likely component of SOB due to anemia given severity Admits to two black bowel movements, most recent colonoscopy > 10 years ago and had polyps removed at that time Hgb 6.5 on admission, baseline ~14? but most recent hgb 10.4 (on 11/25/24)>>8.5 s/p 2PRBCs in ED.  H/H stable.   Hgb has been stable. Gi recommendations appreciated - pt does not appear to be actively bleeding, defers endoscopic evaluation for now unless bleeding recurs.    Drop in H/H noted.  Repeat H/H stable.     # HTN: PTA metoprolol succinate 50 mg, ramipril 2.5 mg reordered as lisinopril 10 mg given ramipril not on formulary  # HLD: PTA simvastatin 40 mg reordered as atorvastatin 20 mg given simvastatin not on formulary  # IDDM2 with hyperglycemia: last known A1c 9.4 (on 11/25/24), blood glucose 256 on admission, POC qac and qhs, PTA Lantus 15 U qhs, low dose SSI qac started, blood glucose goal < 180  # CAD (s/p CABG): PTA ASA 81 mg held due to suspected GI bleed  # pAF (on rivaroxaban): Rate control.  AC as above.    # Chronic hypoxic respiratory failure secondary to COPD (baseline on 3-4L NC at home): PTA tiotropium, symbicort reordered as advair, duonebs PRN  # CKD stage IV: BUN 61 and Cr 2.71 on admission, baseline Cr appears to fluctuate but Cr 2.92 (on 11/25/24), avoid nephrotoxins, renally dose meds, encourage PO hydration, pending official nephrology recs  # BPH: PTA tamsulosin 0.4 mg  # Inpatient DVT Prophylaxis - on AC  Plan of care d/w wife at bedside.      Arun Solares is an 79 year old male with PMHx of HTN, HLD, IDDM2, CAD (s/p CABG), pAF (on rivaroxaban), chronic combined HFpEF and HFrEF (LV EF 25-30% and grade III diastolic dysfunction on echo 8/9/24, s/p AICD placement), hx of severe aortic stenosis (s/p TAVR), chronic hypoxic respiratory failure secondary to COPD (baseline on 3-4L NC at home), CKD stage IV, and BPH who presented to the ED on 1/10/25 for complaints of shortness of breath and admitted for acute on chronic combined HFpEF and HFrEF exacerbation and acute blood loss anemia secondary to suspected upper GI bleed.    # Acute on chronic combined HFpEF and HFrEF exacerbation  -Likely due to non compliance with medications particularly diuretics as per wife.  Sleeps on recliner due to inability to lay flat, denies paroxysmal nocturnal dyspnea.  Pro-BNP 4773 on admission, Prior TTE (on 8/8/24) with LV EF 25-30%, global LV hypokinesis, grade III diastolic dysfunction, moderate pHTN, mild CT, moderate TR   -strict Is and Os, daily weights, low salt diet, 2L fluid restriction  -Continue GDMT with ACEi and beta-blockade  -Some response to aggressive diuresis on Lasix gtt. diursis stooped due to contraction alkalosis. Transitioned heparin to xarelto. PT eval pending  (1/24) Clinically improving with oxygen titration.     # Hypokalemia - in setting of Lasix gtt. Improved. Supplement K.     # LUE DVT - noted on LUE U/S.  Hgb stable.  Start Heparin gtt.  Risks, benefits and alternatives to AC were discussed with wife 1/18.  IN agreement with starting AC. Consider transition to DOAC if H/H remains stable.     # Acute blood loss anemia secondary to suspected upper GI bleed Differential includes PUD (given recent NSAID use) Reports increased shortness of breath x 2 months, likely component of SOB due to anemia given severity Admits to two black bowel movements, most recent colonoscopy > 10 years ago and had polyps removed at that time Hgb 6.5 on admission, baseline ~14? but most recent hgb 10.4 (on 11/25/24)>>8.5 s/p 2PRBCs in ED.  H/H stable.   Hgb has been stable. Gi recommendations appreciated - pt does not appear to be actively bleeding, defers endoscopic evaluation for now unless bleeding recurs.    Drop in H/H noted.  Repeat H/H stable.     # HTN: PTA metoprolol succinate 50 mg, ramipril 2.5 mg reordered as lisinopril 10 mg given ramipril not on formulary  # HLD: PTA simvastatin 40 mg reordered as atorvastatin 20 mg given simvastatin not on formulary  # IDDM2 with hyperglycemia: last known A1c 9.4 (on 11/25/24), blood glucose 256 on admission, POC qac and qhs, PTA Lantus 15 U qhs, low dose SSI qac started, blood glucose goal < 180  # CAD (s/p CABG): PTA ASA 81 mg held due to suspected GI bleed  # pAF (on rivaroxaban): Rate control.  AC as above.    # Chronic hypoxic respiratory failure secondary to COPD (baseline on 3-4L NC at home): PTA tiotropium, symbicort reordered as advair, duonebs PRN  # CKD stage IV: BUN 61 and Cr 2.71 on admission, baseline Cr appears to fluctuate but Cr 2.92 (on 11/25/24), avoid nephrotoxins, renally dose meds, encourage PO hydration, pending official nephrology recs  # BPH: PTA tamsulosin 0.4 mg  # Inpatient DVT Prophylaxis - on xarelto  Plan of care d/w wife at bedside.

## 2025-01-25 LAB
ALBUMIN SERPL ELPH-MCNC: 2.8 G/DL — LOW (ref 3.3–5)
ALP SERPL-CCNC: 197 U/L — HIGH (ref 40–120)
ALT FLD-CCNC: 23 U/L — SIGNIFICANT CHANGE UP (ref 12–78)
ANION GAP SERPL CALC-SCNC: 9 MMOL/L — SIGNIFICANT CHANGE UP (ref 5–17)
AST SERPL-CCNC: 34 U/L — SIGNIFICANT CHANGE UP (ref 15–37)
BASE EXCESS BLDV CALC-SCNC: 16.4 MMOL/L — HIGH (ref -2–3)
BILIRUB SERPL-MCNC: 1.2 MG/DL — SIGNIFICANT CHANGE UP (ref 0.2–1.2)
BLOOD GAS COMMENTS, VENOUS: SIGNIFICANT CHANGE UP
BUN SERPL-MCNC: 58 MG/DL — HIGH (ref 7–23)
CALCIUM SERPL-MCNC: 8.8 MG/DL — SIGNIFICANT CHANGE UP (ref 8.5–10.1)
CHLORIDE SERPL-SCNC: 94 MMOL/L — LOW (ref 96–108)
CO2 BLDV-SCNC: 40 MMOL/L — HIGH (ref 22–26)
CO2 SERPL-SCNC: 35 MMOL/L — HIGH (ref 22–31)
CREAT SERPL-MCNC: 2.81 MG/DL — HIGH (ref 0.5–1.3)
EGFR: 22 ML/MIN/1.73M2 — LOW
GAS PNL BLDV: SIGNIFICANT CHANGE UP
GLUCOSE BLDC GLUCOMTR-MCNC: 174 MG/DL — HIGH (ref 70–99)
GLUCOSE BLDC GLUCOMTR-MCNC: 228 MG/DL — HIGH (ref 70–99)
GLUCOSE BLDC GLUCOMTR-MCNC: 275 MG/DL — HIGH (ref 70–99)
GLUCOSE BLDC GLUCOMTR-MCNC: 294 MG/DL — HIGH (ref 70–99)
GLUCOSE SERPL-MCNC: 162 MG/DL — HIGH (ref 70–99)
HCO3 BLDV-SCNC: 39 MMOL/L — HIGH (ref 22–28)
HCT VFR BLD CALC: 30.6 % — LOW (ref 39–50)
HGB BLD-MCNC: 8.5 G/DL — LOW (ref 13–17)
HOROWITZ INDEX BLDV+IHG-RTO: 21 — SIGNIFICANT CHANGE UP
MCHC RBC-ENTMCNC: 26.6 PG — LOW (ref 27–34)
MCHC RBC-ENTMCNC: 27.8 G/DL — LOW (ref 32–36)
MCV RBC AUTO: 95.6 FL — SIGNIFICANT CHANGE UP (ref 80–100)
NRBC # BLD: 0 /100 WBCS — SIGNIFICANT CHANGE UP (ref 0–0)
NRBC BLD-RTO: 0 /100 WBCS — SIGNIFICANT CHANGE UP (ref 0–0)
PCO2 BLDV: 38 MMHG — LOW (ref 42–55)
PH BLDV: 7.62 — CRITICAL HIGH (ref 7.32–7.43)
PLATELET # BLD AUTO: 159 K/UL — SIGNIFICANT CHANGE UP (ref 150–400)
PO2 BLDV: 201 MMHG — HIGH (ref 25–45)
POTASSIUM SERPL-MCNC: 3.5 MMOL/L — SIGNIFICANT CHANGE UP (ref 3.5–5.3)
POTASSIUM SERPL-SCNC: 3.5 MMOL/L — SIGNIFICANT CHANGE UP (ref 3.5–5.3)
PROT SERPL-MCNC: 6.7 GM/DL — SIGNIFICANT CHANGE UP (ref 6–8.3)
RBC # BLD: 3.2 M/UL — LOW (ref 4.2–5.8)
RBC # FLD: 28.2 % — HIGH (ref 10.3–14.5)
SAO2 % BLDV: 98.1 % — HIGH (ref 94–98)
SODIUM SERPL-SCNC: 138 MMOL/L — SIGNIFICANT CHANGE UP (ref 135–145)
WBC # BLD: 9.79 K/UL — SIGNIFICANT CHANGE UP (ref 3.8–10.5)
WBC # FLD AUTO: 9.79 K/UL — SIGNIFICANT CHANGE UP (ref 3.8–10.5)

## 2025-01-25 PROCEDURE — 99233 SBSQ HOSP IP/OBS HIGH 50: CPT

## 2025-01-25 PROCEDURE — 99232 SBSQ HOSP IP/OBS MODERATE 35: CPT

## 2025-01-25 PROCEDURE — 71045 X-RAY EXAM CHEST 1 VIEW: CPT | Mod: 26

## 2025-01-25 RX ORDER — POTASSIUM CHLORIDE 750 MG/1
40 TABLET, EXTENDED RELEASE ORAL ONCE
Refills: 0 | Status: COMPLETED | OUTPATIENT
Start: 2025-01-25 | End: 2025-12-24

## 2025-01-25 RX ORDER — POTASSIUM CHLORIDE 750 MG/1
40 TABLET, EXTENDED RELEASE ORAL ONCE
Refills: 0 | Status: COMPLETED | OUTPATIENT
Start: 2025-01-25 | End: 2025-01-25

## 2025-01-25 RX ORDER — BACTERIOSTATIC SODIUM CHLORIDE 0.9 %
1000 VIAL (ML) INJECTION
Refills: 0 | Status: COMPLETED | OUTPATIENT
Start: 2025-01-25 | End: 2025-01-25

## 2025-01-25 RX ADMIN — FLUTICASONE PROPIONATE AND SALMETEROL 1 DOSE(S): 113; 14 POWDER, METERED RESPIRATORY (INHALATION) at 17:11

## 2025-01-25 RX ADMIN — Medication 3: at 17:10

## 2025-01-25 RX ADMIN — POTASSIUM CHLORIDE 40 MILLIEQUIVALENT(S): 750 TABLET, EXTENDED RELEASE ORAL at 11:44

## 2025-01-25 RX ADMIN — Medication 75 MILLILITER(S): at 09:44

## 2025-01-25 RX ADMIN — PANTOPRAZOLE 40 MILLIGRAM(S): 20 TABLET, DELAYED RELEASE ORAL at 06:42

## 2025-01-25 RX ADMIN — ATORVASTATIN CALCIUM 20 MILLIGRAM(S): 80 TABLET, FILM COATED ORAL at 21:36

## 2025-01-25 RX ADMIN — Medication 25 MILLIGRAM(S): at 06:42

## 2025-01-25 RX ADMIN — PANTOPRAZOLE 40 MILLIGRAM(S): 20 TABLET, DELAYED RELEASE ORAL at 17:11

## 2025-01-25 RX ADMIN — Medication 1: at 08:40

## 2025-01-25 RX ADMIN — INSULIN GLARGINE-YFGN 22 UNIT(S): 100 INJECTION, SOLUTION SUBCUTANEOUS at 21:38

## 2025-01-25 RX ADMIN — Medication 250 MILLIGRAM(S): at 21:36

## 2025-01-25 RX ADMIN — FLUTICASONE PROPIONATE AND SALMETEROL 1 DOSE(S): 113; 14 POWDER, METERED RESPIRATORY (INHALATION) at 06:49

## 2025-01-25 RX ADMIN — RIVAROXABAN 15 MILLIGRAM(S): 20 TABLET, FILM COATED ORAL at 17:10

## 2025-01-25 RX ADMIN — TIOTROPIUM BROMIDE MONOHYDRATE 2 PUFF(S): 18 CAPSULE ORAL; RESPIRATORY (INHALATION) at 11:44

## 2025-01-25 RX ADMIN — Medication 250 MILLIGRAM(S): at 11:41

## 2025-01-25 RX ADMIN — TAMSULOSIN HYDROCHLORIDE 0.4 MILLIGRAM(S): 0.4 CAPSULE ORAL at 21:36

## 2025-01-25 RX ADMIN — Medication 2: at 11:41

## 2025-01-25 NOTE — PROGRESS NOTE ADULT - SUBJECTIVE AND OBJECTIVE BOX
Cardiology Progress Note    Interval Events:  No significant events      MEDICATIONS:  acetaZOLAMIDE Injectable 250 milliGRAM(s) IV Push every 12 hours  metoprolol succinate ER 25 milliGRAM(s) Oral daily  rivaroxaban 15 milliGRAM(s) Oral with dinner  albuterol/ipratropium for Nebulization.. 3 milliLiter(s) Inhalation every 6 hours PRN  fluticasone propionate/ salmeterol 250-50 MICROgram(s) Diskus 1 Dose(s) Inhalation two times a day  tiotropium 2.5 MICROgram(s) Inhaler 2 Puff(s) Inhalation daily  acetaminophen     Tablet .. 650 milliGRAM(s) Oral every 6 hours PRN  ondansetron Injectable 4 milliGRAM(s) IV Push every 8 hours PRN  aluminum hydroxide/magnesium hydroxide/simethicone Suspension 30 milliLiter(s) Oral every 4 hours PRN  pantoprazole    Tablet 40 milliGRAM(s) Oral two times a day  atorvastatin 20 milliGRAM(s) Oral at bedtime  dextrose 50% Injectable 25 Gram(s) IV Push once  dextrose 50% Injectable 12.5 Gram(s) IV Push once  dextrose 50% Injectable 25 Gram(s) IV Push once  dextrose Oral Gel 15 Gram(s) Oral once PRN  glucagon  Injectable 1 milliGRAM(s) IntraMuscular once  insulin glargine Injectable (LANTUS) 22 Unit(s) SubCutaneous at bedtime  insulin lispro (ADMELOG) corrective regimen sliding scale   SubCutaneous three times a day before meals  dextrose 5%. 1000 milliLiter(s) IV Continuous <Continuous>  dextrose 5%. 1000 milliLiter(s) IV Continuous <Continuous>  influenza  Vaccine (HIGH DOSE) 0.5 milliLiter(s) IntraMuscular once  tamsulosin 0.4 milliGRAM(s) Oral at bedtime      PHYSICAL EXAM:  T(C): 36.6 (01-25-25 @ 11:11), Max: 37.2 (01-24-25 @ 23:50)  HR: 70 (01-25-25 @ 11:11) (70 - 71)  BP: 107/68 (01-25-25 @ 11:11) (107/68 - 122/76)  RR: 18 (01-25-25 @ 11:11) (17 - 18)  SpO2: 98% (01-25-25 @ 11:11) (96% - 99%)  Wt(kg): --  I&O's Summary    24 Jan 2025 07:01  -  25 Jan 2025 07:00  --------------------------------------------------------  IN: 0 mL / OUT: 650 mL / NET: -650 mL    Appearance: No acute distress  HEENT:  mmm  Cardiovascular: Normal S1 S2, +elevated JVP, no murmurs, 1+ LE edema  Respiratory: bibasilar crackles  Psychiatry: Mood & affect appropriate  Gastrointestinal:  soft distended  Skin:  no cyanosis    LABS:	 	  CBC Full  -  ( 25 Jan 2025 08:30 )  WBC Count : 9.79 K/uL  Hemoglobin : 8.5 g/dL  Hematocrit : 30.6 %  Platelet Count - Automated : 159 K/uL    01-25  138  |  94[L]  |  58[H]  ----------------------------<  162[H]  3.5   |  35[H]  |  2.81[H]    01-24  140  |  92[L]  |  59[H]  ----------------------------<  173[H]  3.6   |  41[H]  |  2.79[H]    Ca    8.8      25 Jan 2025 08:30  Ca    9.3      24 Jan 2025 08:27    TPro  6.7  /  Alb  2.8[L]  /  TBili  1.2  /  DBili  x   /  AST  34  /  ALT  23  /  AlkPhos  197[H]  01-25

## 2025-01-25 NOTE — PROGRESS NOTE ADULT - SUBJECTIVE AND OBJECTIVE BOX
Comfortable on NC O2    Vital Signs Last 24 Hrs  T(C): 36.3 (01-25-25 @ 20:45), Max: 37.2 (01-24-25 @ 23:50)  T(F): 97.4 (01-25-25 @ 20:45), Max: 99 (01-24-25 @ 23:50)  HR: 72 (01-25-25 @ 20:45) (70 - 72)  BP: 120/72 (01-25-25 @ 20:45) (91/62 - 122/76)  RR: 18 (01-25-25 @ 20:45) (17 - 18)  SpO2: 97% (01-25-25 @ 20:45) (96% - 99%)    I&O's Detail    24 Jan 2025 07:01  -  25 Jan 2025 07:00  --------------------------------------------------------  OUT:    Voided (mL): 650 mL  Total OUT: 650 mL    Lungs b/l air entry  Heart S1S2  Abd soft ND  Extremities edema                        8.5    9.79  )-----------( 159      ( 25 Jan 2025 08:30 )             30.6     25 Jan 2025 08:30    138    |  94     |  58     ----------------------------<  162    3.5     |  35     |  2.81     Ca    8.8        25 Jan 2025 08:30    TPro  6.7    /  Alb  2.8    /  TBili  1.2    /  DBili  x      /  AST  34     /  ALT  23     /  AlkPhos  197    25 Jan 2025 08:30    LIVER FUNCTIONS - ( 25 Jan 2025 08:30 )  Alb: 2.8 g/dL / Pro: 6.7 gm/dL / ALK PHOS: 197 U/L / ALT: 23 U/L / AST: 34 U/L / GGT: x           acetaminophen     Tablet .. 650 milliGRAM(s) Oral every 6 hours PRN  albuterol/ipratropium for Nebulization.. 3 milliLiter(s) Inhalation every 6 hours PRN  aluminum hydroxide/magnesium hydroxide/simethicone Suspension 30 milliLiter(s) Oral every 4 hours PRN  atorvastatin 20 milliGRAM(s) Oral at bedtime  dextrose 5%. 1000 milliLiter(s) IV Continuous <Continuous>  dextrose 5%. 1000 milliLiter(s) IV Continuous <Continuous>  dextrose 50% Injectable 25 Gram(s) IV Push once  dextrose 50% Injectable 12.5 Gram(s) IV Push once  dextrose 50% Injectable 25 Gram(s) IV Push once  dextrose Oral Gel 15 Gram(s) Oral once PRN  fluticasone propionate/ salmeterol 250-50 MICROgram(s) Diskus 1 Dose(s) Inhalation two times a day  glucagon  Injectable 1 milliGRAM(s) IntraMuscular once  influenza  Vaccine (HIGH DOSE) 0.5 milliLiter(s) IntraMuscular once  insulin glargine Injectable (LANTUS) 22 Unit(s) SubCutaneous at bedtime  insulin lispro (ADMELOG) corrective regimen sliding scale   SubCutaneous three times a day before meals  metoprolol succinate ER 25 milliGRAM(s) Oral daily  ondansetron Injectable 4 milliGRAM(s) IV Push every 8 hours PRN  pantoprazole    Tablet 40 milliGRAM(s) Oral two times a day  rivaroxaban 15 milliGRAM(s) Oral with dinner  tamsulosin 0.4 milliGRAM(s) Oral at bedtime  tiotropium 2.5 MICROgram(s) Inhaler 2 Puff(s) Inhalation daily    Assessment/Plan:    Fluid overload   DENISHA/CKD 3  Contraction alkalosis   Following off diuretics for now  BMP in am  Avoid nephrotoxins     [phone]

## 2025-01-25 NOTE — PROGRESS NOTE ADULT - SUBJECTIVE AND OBJECTIVE BOX
Patient is a 79y old  Male who presents with a chief complaint of Acute on chronic combined HFpEF and HFrEF exacerbation, acute blood loss anemia secondary to suspected upper GI bleed (23 Jan 2025 12:58)      Vital Signs Last 24 Hrs  T(C): 36.2 (24 Jan 2025 11:05), Max: 36.9 (23 Jan 2025 23:22)  T(F): 97.2 (24 Jan 2025 11:05), Max: 98.5 (23 Jan 2025 23:22)  HR: 71 (24 Jan 2025 11:05) (65 - 72)  BP: 108/66 (24 Jan 2025 11:05) (102/66 - 110/67)  RR: 18 (24 Jan 2025 11:05) (17 - 18)  SpO2: 100% (24 Jan 2025 11:05) (95% - 100%)    Parameters below as of 24 Jan 2025 11:05  Patient On (Oxygen Delivery Method): nasal cannula            Physical exam  GENERAL:  NAD Alert and Oriented x 3   HEENT: NCAT  CARDIOVASCULAR:  S1, S2  LUNGS: bibasilar crackles.   ABDOMEN:  soft, (-) tenderness, (-) distension, (+) bowel sounds, (-) guarding, (-) rebound (-) rigidity  EXTREMITIES:  no cyanosis / clubbing.  2+ edema.       Labs       LABS:  cret                        8.9    10.37 )-----------( 139      ( 24 Jan 2025 08:27 )             32.0     01-24    140  |  92[L]  |  59[H]  ----------------------------<  173[H]  3.6   |  41[H]  |  2.79[H]    Ca    9.3      24 Jan 2025 08:27      PTT - ( 22 Jan 2025 22:50 )  PTT:41.7 sec    Imaging reviewed                      Radiology and Imaging reviewed. Patient is a 79y old  Male who presents with a chief complaint of Acute on chronic combined HFpEF and HFrEF exacerbation, acute blood loss anemia secondary to suspected upper GI bleed (23 Jan 2025 12:58)    Pt seen and examined at bedside. No acute events overnight. AAOx3. Denies any fever chills, chest pain, SOB, GI/ sx, or any other active complaints.     Vital Signs Last 24 Hrs  T(C): 36.6 (25 Jan 2025 11:11), Max: 37.2 (24 Jan 2025 23:50)  T(F): 97.8 (25 Jan 2025 11:11), Max: 99 (24 Jan 2025 23:50)  HR: 70 (25 Jan 2025 11:11) (70 - 71)  BP: 107/68 (25 Jan 2025 11:11) (107/68 - 122/76)  RR: 18 (25 Jan 2025 11:11) (17 - 18)  SpO2: 98% (25 Jan 2025 11:11) (96% - 99%)      Physical exam  GENERAL:  NAD Alert and Oriented x 3   HEENT: NCAT  CARDIOVASCULAR:  S1, S2  LUNGS: bibasilar crackles.   ABDOMEN:  soft, (-) tenderness, (-) distension, (+) bowel sounds, (-) guarding, (-) rebound (-) rigidity  EXTREMITIES:  no cyanosis / clubbing.  2+ edema.       Labs       cret                        8.5    9.79  )-----------( 159      ( 25 Jan 2025 08:30 )             30.6     01-25    138  |  94[L]  |  58[H]  ----------------------------<  162[H]  3.5   |  35[H]  |  2.81[H]    Ca    8.8      25 Jan 2025 08:30    TPro  6.7  /  Alb  2.8[L]  /  TBili  1.2  /  DBili  x   /  AST  34  /  ALT  23  /  AlkPhos  197[H]  01-25    Radiology and Imaging reviewed.

## 2025-01-25 NOTE — PROGRESS NOTE ADULT - ASSESSMENT
79M with CAD/CABG, HFrEF 25%, ICD, afib, copd on home o2, htn hld dm p/w adhf, acute anemia requiring PRBC transfusion     remains volume overloaded, however metabolic alkalosis with CO2 retention precludes further diuresis    s/p bumex drip-> lasix 80 iv-> off now 2/2 contraction alkalosis, renal recs appreciated, acetazolamide dosing  monitor renal function, electrolytes, bicarb   aim to keep k ~4.5 with contraction alkalosis by supplementing with KCl  cont on Toprol 25/d  add further GDMT for cardiomyopathy as BP and renal function allows  +Lt UE DVT - on AC,  iv heparin now switched to Xarelto, home med

## 2025-01-25 NOTE — PROGRESS NOTE ADULT - ASSESSMENT
Arun Solares is an 79 year old male with PMHx of HTN, HLD, IDDM2, CAD (s/p CABG), pAF (on rivaroxaban), chronic combined HFpEF and HFrEF (LV EF 25-30% and grade III diastolic dysfunction on echo 8/9/24, s/p AICD placement), hx of severe aortic stenosis (s/p TAVR), chronic hypoxic respiratory failure secondary to COPD (baseline on 3-4L NC at home), CKD stage IV, and BPH who presented to the ED on 1/10/25 for complaints of shortness of breath and admitted for acute on chronic combined HFpEF and HFrEF exacerbation and acute blood loss anemia secondary to suspected upper GI bleed.    # Acute on chronic combined HFpEF and HFrEF exacerbation  -Likely due to non compliance with medications particularly diuretics as per wife.  Sleeps on recliner due to inability to lay flat, denies paroxysmal nocturnal dyspnea.  Pro-BNP 4773 on admission, Prior TTE (on 8/8/24) with LV EF 25-30%, global LV hypokinesis, grade III diastolic dysfunction, moderate pHTN, mild [place], moderate TR   -strict Is and Os, daily weights, low salt diet, 2L fluid restriction  -Continue GDMT with ACEi and beta-blockade  -Some response to aggressive diuresis on Lasix gtt. diursis stooped due to contraction alkalosis. Transitioned heparin to xarelto. PT eval pending  (1/24) Clinically improving with oxygen titration.     # Hypokalemia - in setting of Lasix gtt. Improved. Supplement K.     # LUE DVT - noted on LUE U/S.  Hgb stable.  Start Heparin gtt.  Risks, benefits and alternatives to AC were discussed with wife 1/18.  IN agreement with starting AC. Consider transition to DOAC if H/H remains stable.     # Acute blood loss anemia secondary to suspected upper GI bleed Differential includes PUD (given recent NSAID use) Reports increased shortness of breath x 2 months, likely component of SOB due to anemia given severity Admits to two black bowel movements, most recent colonoscopy > 10 years ago and had polyps removed at that time Hgb 6.5 on admission, baseline ~14? but most recent hgb 10.4 (on 11/25/24)>>8.5 s/p 2PRBCs in ED.  H/H stable.   Hgb has been stable. Gi recommendations appreciated - pt does not appear to be actively bleeding, defers endoscopic evaluation for now unless bleeding recurs.    Drop in H/H noted.  Repeat H/H stable.     # HTN: PTA metoprolol succinate 50 mg, ramipril 2.5 mg reordered as lisinopril 10 mg given ramipril not on formulary  # HLD: PTA simvastatin 40 mg reordered as atorvastatin 20 mg given simvastatin not on formulary  # IDDM2 with hyperglycemia: last known A1c 9.4 (on 11/25/24), blood glucose 256 on admission, POC qac and qhs, PTA Lantus 15 U qhs, low dose SSI qac started, blood glucose goal < 180  # CAD (s/p CABG): PTA ASA 81 mg held due to suspected GI bleed  # pAF (on rivaroxaban): Rate control.  AC as above.    # Chronic hypoxic respiratory failure secondary to COPD (baseline on 3-4L NC at home): PTA tiotropium, symbicort reordered as advair, duonebs PRN  # CKD stage IV: BUN 61 and Cr 2.71 on admission, baseline Cr appears to fluctuate but Cr 2.92 (on 11/25/24), avoid nephrotoxins, renally dose meds, encourage PO hydration, pending official nephrology recs  # BPH: PTA tamsulosin 0.4 mg  # Inpatient DVT Prophylaxis - on xarelto  Plan of care d/w wife at bedside.      Arun Solares is an 79 year old male with PMHx of HTN, HLD, IDDM2, CAD (s/p CABG), pAF (on rivaroxaban), chronic combined HFpEF and HFrEF (LV EF 25-30% and grade III diastolic dysfunction on echo 8/9/24, s/p AICD placement), hx of severe aortic stenosis (s/p TAVR), chronic hypoxic respiratory failure secondary to COPD (baseline on 3-4L NC at home), CKD stage IV, and BPH who presented to the ED on 1/10/25 for complaints of shortness of breath and admitted for acute on chronic combined HFpEF and HFrEF exacerbation and acute blood loss anemia secondary to suspected upper GI bleed.    # Acute on chronic combined HFpEF and HFrEF exacerbation  -Likely due to non compliance with medications particularly diuretics as per wife.  Sleeps on recliner due to inability to lay flat, denies paroxysmal nocturnal dyspnea.  Pro-BNP 4773 on admission, Prior TTE (on 8/8/24) with LV EF 25-30%, global LV hypokinesis, grade III diastolic dysfunction, moderate pHTN, mild [place], moderate TR   -strict Is and Os, daily weights, low salt diet, 2L fluid restriction  -Continue GDMT with ACEi and beta-blockade  -Some response to aggressive diuresis on Lasix gtt. Diuresis stopped due to contraction alkalosis. Started on acetazolamide Transitioned heparin to xarelto. PT eval pending  (1/24) Clinically improving with oxygen titration.     # Hypokalemia - in setting of Lasix gtt. Improved. Supplement K.     # LUE DVT - noted on LUE U/S.  Hgb stable.  Start Heparin gtt.  Risks, benefits and alternatives to AC were discussed with wife 1/18.  IN agreement with starting AC. Consider transition to DOAC if H/H remains stable.     # Acute blood loss anemia secondary to suspected upper GI bleed Differential includes PUD (given recent NSAID use) Reports increased shortness of breath x 2 months, likely component of SOB due to anemia given severity Admits to two black bowel movements, most recent colonoscopy > 10 years ago and had polyps removed at that time Hgb 6.5 on admission, baseline ~14? but most recent hgb 10.4 (on 11/25/24)>>8.5 s/p 2PRBCs in ED.  H/H stable.   Hgb has been stable. Gi recommendations appreciated - pt does not appear to be actively bleeding, defers endoscopic evaluation for now unless bleeding recurs.    H/H stable.     # HTN: PTA metoprolol succinate 50 mg, ramipril 2.5 mg reordered as lisinopril 10 mg given ramipril not on formulary  # HLD: PTA simvastatin 40 mg reordered as atorvastatin 20 mg given simvastatin not on formulary  # IDDM2 with hyperglycemia: last known A1c 9.4 (on 11/25/24), blood glucose 256 on admission, POC qac and qhs, PTA Lantus 15 U qhs, low dose SSI qac started, blood glucose goal < 180  # CAD (s/p CABG): PTA ASA 81 mg held due to suspected GI bleed  # pAF (on rivaroxaban): Rate control.  AC as above.    # Chronic hypoxic respiratory failure secondary to COPD (baseline on 3-4L NC at home): PTA tiotropium, symbicort reordered as advair, duonebs PRN  # CKD stage IV: BUN 61 and Cr 2.71 on admission, baseline Cr appears to fluctuate but Cr 2.92 (on 11/25/24), avoid nephrotoxins, renally dose meds, encourage PO hydration  # BPH: PTA tamsulosin 0.4 mg  # Inpatient DVT Prophylaxis - on xarelto  Plan of care d/w wife at bedside.     DC planning: Pt recs CHIARA, f/u nephro recs for diuresis

## 2025-01-26 LAB
ALBUMIN SERPL ELPH-MCNC: 2.9 G/DL — LOW (ref 3.3–5)
ALP SERPL-CCNC: 208 U/L — HIGH (ref 40–120)
ALT FLD-CCNC: 23 U/L — SIGNIFICANT CHANGE UP (ref 12–78)
ANION GAP SERPL CALC-SCNC: 5 MMOL/L — SIGNIFICANT CHANGE UP (ref 5–17)
APPEARANCE UR: CLEAR — SIGNIFICANT CHANGE UP
AST SERPL-CCNC: 30 U/L — SIGNIFICANT CHANGE UP (ref 15–37)
BACTERIA # UR AUTO: NEGATIVE /HPF — SIGNIFICANT CHANGE UP
BASE EXCESS BLDV CALC-SCNC: 12.1 MMOL/L — HIGH (ref -2–3)
BILIRUB SERPL-MCNC: 1 MG/DL — SIGNIFICANT CHANGE UP (ref 0.2–1.2)
BILIRUB UR-MCNC: NEGATIVE — SIGNIFICANT CHANGE UP
BLOOD GAS COMMENTS, VENOUS: SIGNIFICANT CHANGE UP
BUN SERPL-MCNC: 60 MG/DL — HIGH (ref 7–23)
CALCIUM SERPL-MCNC: 8.7 MG/DL — SIGNIFICANT CHANGE UP (ref 8.5–10.1)
CHLORIDE SERPL-SCNC: 94 MMOL/L — LOW (ref 96–108)
CO2 BLDV-SCNC: 42 MMOL/L — HIGH (ref 22–26)
CO2 SERPL-SCNC: 38 MMOL/L — HIGH (ref 22–31)
COD CRY URNS QL: PRESENT
COLOR SPEC: YELLOW — SIGNIFICANT CHANGE UP
CREAT SERPL-MCNC: 2.93 MG/DL — HIGH (ref 0.5–1.3)
DIFF PNL FLD: NEGATIVE — SIGNIFICANT CHANGE UP
EGFR: 21 ML/MIN/1.73M2 — LOW
GAS PNL BLDV: SIGNIFICANT CHANGE UP
GLUCOSE BLDC GLUCOMTR-MCNC: 194 MG/DL — HIGH (ref 70–99)
GLUCOSE BLDC GLUCOMTR-MCNC: 196 MG/DL — HIGH (ref 70–99)
GLUCOSE BLDC GLUCOMTR-MCNC: 288 MG/DL — HIGH (ref 70–99)
GLUCOSE BLDC GLUCOMTR-MCNC: 388 MG/DL — HIGH (ref 70–99)
GLUCOSE SERPL-MCNC: 194 MG/DL — HIGH (ref 70–99)
GLUCOSE UR QL: NEGATIVE MG/DL — SIGNIFICANT CHANGE UP
HCO3 BLDV-SCNC: 40 MMOL/L — HIGH (ref 22–28)
HCT VFR BLD CALC: 33 % — LOW (ref 39–50)
HGB BLD-MCNC: 9.2 G/DL — LOW (ref 13–17)
HOROWITZ INDEX BLDV+IHG-RTO: 21 — SIGNIFICANT CHANGE UP
KETONES UR-MCNC: NEGATIVE MG/DL — SIGNIFICANT CHANGE UP
LEUKOCYTE ESTERASE UR-ACNC: ABNORMAL
MCHC RBC-ENTMCNC: 26.5 PG — LOW (ref 27–34)
MCHC RBC-ENTMCNC: 27.9 G/DL — LOW (ref 32–36)
MCV RBC AUTO: 95.1 FL — SIGNIFICANT CHANGE UP (ref 80–100)
NITRITE UR-MCNC: NEGATIVE — SIGNIFICANT CHANGE UP
NRBC # BLD: 0 /100 WBCS — SIGNIFICANT CHANGE UP (ref 0–0)
NRBC BLD-RTO: 0 /100 WBCS — SIGNIFICANT CHANGE UP (ref 0–0)
PCO2 BLDV: 66 MMHG — HIGH (ref 42–55)
PH BLDV: 7.39 — SIGNIFICANT CHANGE UP (ref 7.32–7.43)
PH UR: 7.5 — SIGNIFICANT CHANGE UP (ref 5–8)
PLATELET # BLD AUTO: 177 K/UL — SIGNIFICANT CHANGE UP (ref 150–400)
PO2 BLDV: 60 MMHG — HIGH (ref 25–45)
POTASSIUM SERPL-MCNC: 3.5 MMOL/L — SIGNIFICANT CHANGE UP (ref 3.5–5.3)
POTASSIUM SERPL-SCNC: 3.5 MMOL/L — SIGNIFICANT CHANGE UP (ref 3.5–5.3)
PROT SERPL-MCNC: 6.8 GM/DL — SIGNIFICANT CHANGE UP (ref 6–8.3)
PROT UR-MCNC: SIGNIFICANT CHANGE UP MG/DL
RBC # BLD: 3.47 M/UL — LOW (ref 4.2–5.8)
RBC # FLD: 28.4 % — HIGH (ref 10.3–14.5)
RBC CASTS # UR COMP ASSIST: 1 /HPF — SIGNIFICANT CHANGE UP (ref 0–4)
SAO2 % BLDV: 89.2 % — LOW (ref 94–98)
SODIUM SERPL-SCNC: 137 MMOL/L — SIGNIFICANT CHANGE UP (ref 135–145)
SP GR SPEC: 1.02 — SIGNIFICANT CHANGE UP (ref 1–1.03)
UROBILINOGEN FLD QL: 2 MG/DL (ref 0.2–1)
WBC # BLD: 9.75 K/UL — SIGNIFICANT CHANGE UP (ref 3.8–10.5)
WBC # FLD AUTO: 9.75 K/UL — SIGNIFICANT CHANGE UP (ref 3.8–10.5)
WBC UR QL: 2 /HPF — SIGNIFICANT CHANGE UP (ref 0–5)

## 2025-01-26 PROCEDURE — 99232 SBSQ HOSP IP/OBS MODERATE 35: CPT

## 2025-01-26 RX ADMIN — ATORVASTATIN CALCIUM 20 MILLIGRAM(S): 80 TABLET, FILM COATED ORAL at 21:31

## 2025-01-26 RX ADMIN — Medication 1: at 11:56

## 2025-01-26 RX ADMIN — PANTOPRAZOLE 40 MILLIGRAM(S): 20 TABLET, DELAYED RELEASE ORAL at 17:50

## 2025-01-26 RX ADMIN — FLUTICASONE PROPIONATE AND SALMETEROL 1 DOSE(S): 113; 14 POWDER, METERED RESPIRATORY (INHALATION) at 05:03

## 2025-01-26 RX ADMIN — TIOTROPIUM BROMIDE MONOHYDRATE 2 PUFF(S): 18 CAPSULE ORAL; RESPIRATORY (INHALATION) at 13:01

## 2025-01-26 RX ADMIN — Medication 25 MILLIGRAM(S): at 05:03

## 2025-01-26 RX ADMIN — PANTOPRAZOLE 40 MILLIGRAM(S): 20 TABLET, DELAYED RELEASE ORAL at 05:03

## 2025-01-26 RX ADMIN — Medication 1: at 08:22

## 2025-01-26 RX ADMIN — RIVAROXABAN 15 MILLIGRAM(S): 20 TABLET, FILM COATED ORAL at 18:09

## 2025-01-26 RX ADMIN — FLUTICASONE PROPIONATE AND SALMETEROL 1 DOSE(S): 113; 14 POWDER, METERED RESPIRATORY (INHALATION) at 17:37

## 2025-01-26 RX ADMIN — INSULIN GLARGINE-YFGN 22 UNIT(S): 100 INJECTION, SOLUTION SUBCUTANEOUS at 21:32

## 2025-01-26 RX ADMIN — Medication 3: at 17:48

## 2025-01-26 RX ADMIN — TAMSULOSIN HYDROCHLORIDE 0.4 MILLIGRAM(S): 0.4 CAPSULE ORAL at 21:31

## 2025-01-26 NOTE — PROGRESS NOTE ADULT - SUBJECTIVE AND OBJECTIVE BOX
seen for hf, ckd    no acute complaints  feels weak and tired  ros negative     MEDICATIONS  (STANDING):  atorvastatin 20 milliGRAM(s) Oral at bedtime  dextrose 5%. 1000 milliLiter(s) (50 mL/Hr) IV Continuous <Continuous>  dextrose 5%. 1000 milliLiter(s) (100 mL/Hr) IV Continuous <Continuous>  dextrose 50% Injectable 25 Gram(s) IV Push once  dextrose 50% Injectable 12.5 Gram(s) IV Push once  dextrose 50% Injectable 25 Gram(s) IV Push once  fluticasone propionate/ salmeterol 250-50 MICROgram(s) Diskus 1 Dose(s) Inhalation two times a day  glucagon  Injectable 1 milliGRAM(s) IntraMuscular once  influenza  Vaccine (HIGH DOSE) 0.5 milliLiter(s) IntraMuscular once  insulin glargine Injectable (LANTUS) 22 Unit(s) SubCutaneous at bedtime  insulin lispro (ADMELOG) corrective regimen sliding scale   SubCutaneous three times a day before meals  metoprolol succinate ER 25 milliGRAM(s) Oral daily  pantoprazole    Tablet 40 milliGRAM(s) Oral two times a day  rivaroxaban 15 milliGRAM(s) Oral with dinner  tamsulosin 0.4 milliGRAM(s) Oral at bedtime  tiotropium 2.5 MICROgram(s) Inhaler 2 Puff(s) Inhalation daily    MEDICATIONS  (PRN):  acetaminophen     Tablet .. 650 milliGRAM(s) Oral every 6 hours PRN Temp greater or equal to 38C (100.4F), Mild Pain (1 - 3)  albuterol/ipratropium for Nebulization.. 3 milliLiter(s) Inhalation every 6 hours PRN -for shortness of breath and/or wheezing  aluminum hydroxide/magnesium hydroxide/simethicone Suspension 30 milliLiter(s) Oral every 4 hours PRN Dyspepsia  dextrose Oral Gel 15 Gram(s) Oral once PRN Blood Glucose LESS THAN 70 milliGRAM(s)/deciliter  ondansetron Injectable 4 milliGRAM(s) IV Push every 8 hours PRN Nausea and/or Vomiting      Allergies    No Known Allergies      Vital Signs Last 24 Hrs  T(C): 36.2 (26 Jan 2025 10:38), Max: 36.8 (25 Jan 2025 15:57)  T(F): 97.2 (26 Jan 2025 10:38), Max: 98.3 (25 Jan 2025 15:57)  HR: 71 (26 Jan 2025 10:38) (69 - 72)  BP: 110/67 (26 Jan 2025 10:38) (91/62 - 133/68)  BP(mean): --  RR: 18 (26 Jan 2025 10:38) (17 - 18)  SpO2: 99% (26 Jan 2025 10:38) (95% - 99%)    Parameters below as of 26 Jan 2025 04:45  Patient On (Oxygen Delivery Method): nasal cannula  O2 Flow (L/min): 3      PHYSICAL EXAM:    GENERAL: NAD   CHEST/LUNG: Clear to ausculation bilaterally; poor effort   HEART: Regular rate and rhythm; S1 S2  ABDOMEN:  distended; Bowel sounds present  EXTREMITIES:  edematous LE  NERVOUS SYSTEM:  Alert & Oriented X3,   ; Motor Strength 5/5 B/L upper and lower extremities  PSYCH: normal mood, appropriate response.    LABS:                        9.2    9.75  )-----------( 177      ( 26 Jan 2025 06:15 )             33.0     01-26    137  |  94[L]  |  60[H]  ----------------------------<  194[H]  3.5   |  38[H]  |  2.93[H]    Ca    8.7      26 Jan 2025 06:15    TPro  6.8  /  Alb  2.9[L]  /  TBili  1.0  /  DBili  x   /  AST  30  /  ALT  23  /  AlkPhos  208[H]  01-26      Urinalysis Basic - ( 26 Jan 2025 06:15 )    Color: x / Appearance: x / SG: x / pH: x  Gluc: 194 mg/dL / Ketone: x  / Bili: x / Urobili: x   Blood: x / Protein: x / Nitrite: x   Leuk Esterase: x / RBC: x / WBC x   Sq Epi: x / Non Sq Epi: x / Bacteria: x        CAPILLARY BLOOD GLUCOSE      POCT Blood Glucose.: 196 mg/dL (26 Jan 2025 11:02)  POCT Blood Glucose.: 194 mg/dL (26 Jan 2025 07:25)  POCT Blood Glucose.: 294 mg/dL (25 Jan 2025 20:53)  POCT Blood Glucose.: 275 mg/dL (25 Jan 2025 16:35)        RADIOLOGY & ADDITIONAL TESTS:

## 2025-01-26 NOTE — PROGRESS NOTE ADULT - SUBJECTIVE AND OBJECTIVE BOX
Comfortable on NC O2    Vital Signs Last 24 Hrs  T(C): 36.7 (01-26-25 @ 15:44), Max: 36.7 (01-26-25 @ 15:44)  T(F): 98.1 (01-26-25 @ 15:44), Max: 98.1 (01-26-25 @ 15:44)  HR: 70 (01-26-25 @ 15:44) (69 - 71)  BP: 108/63 (01-26-25 @ 15:44) (108/63 - 133/68)  RR: 17 (01-26-25 @ 15:44) (17 - 18)  SpO2: 92% (01-26-25 @ 15:44) (92% - 99%)    I&O's Detail    25 Jan 2025 07:01  -  26 Jan 2025 07:00  --------------------------------------------------------  OUT:    Voided (mL): 1000 mL  Total OUT: 1000 mL    Lungs b/l air entry  Heart S1S2  Abd soft ND  Extremities edema                                 9.2    9.75  )-----------( 177      ( 26 Jan 2025 06:15 )             33.0     26 Jan 2025 06:15    137    |  94     |  60     ----------------------------<  194    3.5     |  38     |  2.93     Ca    8.7        26 Jan 2025 06:15    TPro  6.8    /  Alb  2.9    /  TBili  1.0    /  DBili  x      /  AST  30     /  ALT  23     /  AlkPhos  208    26 Jan 2025 06:15    LIVER FUNCTIONS - ( 26 Jan 2025 06:15 )  Alb: 2.9 g/dL / Pro: 6.8 gm/dL / ALK PHOS: 208 U/L / ALT: 23 U/L / AST: 30 U/L / GGT: x           acetaminophen     Tablet .. 650 milliGRAM(s) Oral every 6 hours PRN  albuterol/ipratropium for Nebulization.. 3 milliLiter(s) Inhalation every 6 hours PRN  aluminum hydroxide/magnesium hydroxide/simethicone Suspension 30 milliLiter(s) Oral every 4 hours PRN  atorvastatin 20 milliGRAM(s) Oral at bedtime  dextrose 5%. 1000 milliLiter(s) IV Continuous <Continuous>  dextrose 5%. 1000 milliLiter(s) IV Continuous <Continuous>  dextrose 50% Injectable 25 Gram(s) IV Push once  dextrose 50% Injectable 12.5 Gram(s) IV Push once  dextrose 50% Injectable 25 Gram(s) IV Push once  dextrose Oral Gel 15 Gram(s) Oral once PRN  fluticasone propionate/ salmeterol 250-50 MICROgram(s) Diskus 1 Dose(s) Inhalation two times a day  glucagon  Injectable 1 milliGRAM(s) IntraMuscular once  influenza  Vaccine (HIGH DOSE) 0.5 milliLiter(s) IntraMuscular once  insulin glargine Injectable (LANTUS) 22 Unit(s) SubCutaneous at bedtime  insulin lispro (ADMELOG) corrective regimen sliding scale   SubCutaneous three times a day before meals  metoprolol succinate ER 25 milliGRAM(s) Oral daily  ondansetron Injectable 4 milliGRAM(s) IV Push every 8 hours PRN  pantoprazole    Tablet 40 milliGRAM(s) Oral two times a day  rivaroxaban 15 milliGRAM(s) Oral with dinner  tamsulosin 0.4 milliGRAM(s) Oral at bedtime  tiotropium 2.5 MICROgram(s) Inhaler 2 Puff(s) Inhalation daily    Assessment/Plan:    CM, EF 25 - 30%, mod TR  Fluid overload   DENISHA/CKD 3  Contraction alkalosis   Diuretics held for now  Low threshold to restart   F/u BMP, UO  Avoid nephrotoxins     [phone]

## 2025-01-26 NOTE — PROGRESS NOTE ADULT - ASSESSMENT
Arun Solares is an 79 year old male with PMHx of HTN, HLD, IDDM2, CAD (s/p CABG), pAF (on rivaroxaban), chronic combined HFpEF and HFrEF (LV EF 25-30% and grade III diastolic dysfunction on echo 8/9/24, s/p AICD placement), hx of severe aortic stenosis (s/p TAVR), chronic hypoxic respiratory failure secondary to COPD (baseline on 3-4L NC at home), CKD stage IV, and BPH who presented to the ED on 1/10/25 for complaints of shortness of breath and admitted for acute on chronic combined HFpEF and HFrEF exacerbation and acute blood loss anemia secondary to suspected upper GI bleed.    # Acute on chronic combined HFpEF and HFrEF exacerbation  -Likely due to non compliance with medications particularly diuretics as per wife.  Sleeps on recliner due to inability to lay flat, denies paroxysmal nocturnal dyspnea.  Pro-BNP 4773 on admission, Prior TTE (on 8/8/24) with LV EF 25-30%, global LV hypokinesis, grade III diastolic dysfunction, moderate pHTN, mild ID, moderate TR   -strict Is and Os, daily weights, low salt diet, 2L fluid restriction  -Continue GDMT with ACEi and beta-blockade  -Some response to aggressive diuresis on Lasix gtt. Diuresis stopped due to contraction alkalosis. Started on acetazolamide Transitioned heparin to xarelto. PT eval pending  (1/24) Clinically improving with oxygen titration.     # Hypokalemia - in setting of Lasix gtt. Improved. Supplement K.     # LUE DVT - noted on LUE U/S.  Hgb stable.  Start Heparin gtt.  Risks, benefits and alternatives to AC were discussed with wife 1/18.  IN agreement with starting AC. Consider transition to DOAC if H/H remains stable.     # Acute blood loss anemia secondary to suspected upper GI bleed Differential includes PUD (given recent NSAID use) Reports increased shortness of breath x 2 months, likely component of SOB due to anemia given severity Admits to two black bowel movements, most recent colonoscopy > 10 years ago and had polyps removed at that time Hgb 6.5 on admission, baseline ~14? but most recent hgb 10.4 (on 11/25/24)>>8.5 s/p 2PRBCs in ED.  H/H stable.   Hgb has been stable. Gi recommendations appreciated - pt does not appear to be actively bleeding, defers endoscopic evaluation for now unless bleeding recurs.    H/H stable.     # HTN: PTA metoprolol succinate 50 mg, ramipril 2.5 mg reordered as lisinopril 10 mg given ramipril not on formulary  # HLD: PTA simvastatin 40 mg reordered as atorvastatin 20 mg given simvastatin not on formulary  # IDDM2 with hyperglycemia: last known A1c 9.4 (on 11/25/24), blood glucose 256 on admission, POC qac and qhs, PTA Lantus 15 U qhs, low dose SSI qac started, blood glucose goal < 180  # CAD (s/p CABG): PTA ASA 81 mg held due to suspected GI bleed  # pAF (on rivaroxaban): Rate control.  AC as above.    # Chronic hypoxic respiratory failure secondary to COPD (baseline on 3-4L NC at home): PTA tiotropium, symbicort reordered as advair, duonebs PRN  # CKD stage IV: BUN 61 and Cr 2.71 on admission, baseline Cr appears to fluctuate but Cr 2.92 (on 11/25/24), avoid nephrotoxins, renally dose meds, encourage PO hydration  # BPH: PTA tamsulosin 0.4 mg  # Inpatient DVT Prophylaxis - on xarelto  Plan of care d/w wife at bedside.     DC planning: Pt recs [facility],  once cleared by nephro/cards

## 2025-01-27 LAB
ANION GAP SERPL CALC-SCNC: 6 MMOL/L — SIGNIFICANT CHANGE UP (ref 5–17)
BUN SERPL-MCNC: 70 MG/DL — HIGH (ref 7–23)
CALCIUM SERPL-MCNC: 8.9 MG/DL — SIGNIFICANT CHANGE UP (ref 8.5–10.1)
CHLORIDE SERPL-SCNC: 94 MMOL/L — LOW (ref 96–108)
CO2 SERPL-SCNC: 34 MMOL/L — HIGH (ref 22–31)
CREAT SERPL-MCNC: 3.31 MG/DL — HIGH (ref 0.5–1.3)
EGFR: 18 ML/MIN/1.73M2 — LOW
GLUCOSE BLDC GLUCOMTR-MCNC: 219 MG/DL — HIGH (ref 70–99)
GLUCOSE BLDC GLUCOMTR-MCNC: 220 MG/DL — HIGH (ref 70–99)
GLUCOSE BLDC GLUCOMTR-MCNC: 256 MG/DL — HIGH (ref 70–99)
GLUCOSE SERPL-MCNC: 203 MG/DL — HIGH (ref 70–99)
HCT VFR BLD CALC: 30.7 % — LOW (ref 39–50)
HGB BLD-MCNC: 8.7 G/DL — LOW (ref 13–17)
MAGNESIUM SERPL-MCNC: 2.4 MG/DL — SIGNIFICANT CHANGE UP (ref 1.6–2.6)
MCHC RBC-ENTMCNC: 26.8 PG — LOW (ref 27–34)
MCHC RBC-ENTMCNC: 28.3 G/DL — LOW (ref 32–36)
MCV RBC AUTO: 94.5 FL — SIGNIFICANT CHANGE UP (ref 80–100)
NRBC # BLD: 0 /100 WBCS — SIGNIFICANT CHANGE UP (ref 0–0)
NRBC BLD-RTO: 0 /100 WBCS — SIGNIFICANT CHANGE UP (ref 0–0)
PLATELET # BLD AUTO: 185 K/UL — SIGNIFICANT CHANGE UP (ref 150–400)
POTASSIUM SERPL-MCNC: 4.1 MMOL/L — SIGNIFICANT CHANGE UP (ref 3.5–5.3)
POTASSIUM SERPL-SCNC: 4.1 MMOL/L — SIGNIFICANT CHANGE UP (ref 3.5–5.3)
RBC # BLD: 3.25 M/UL — LOW (ref 4.2–5.8)
RBC # FLD: 27.8 % — HIGH (ref 10.3–14.5)
SODIUM SERPL-SCNC: 134 MMOL/L — LOW (ref 135–145)
WBC # BLD: 9.04 K/UL — SIGNIFICANT CHANGE UP (ref 3.8–10.5)
WBC # FLD AUTO: 9.04 K/UL — SIGNIFICANT CHANGE UP (ref 3.8–10.5)

## 2025-01-27 PROCEDURE — 99232 SBSQ HOSP IP/OBS MODERATE 35: CPT

## 2025-01-27 PROCEDURE — 99233 SBSQ HOSP IP/OBS HIGH 50: CPT

## 2025-01-27 RX ORDER — BUMETANIDE 2 MG/1
2 TABLET ORAL DAILY
Refills: 0 | Status: DISCONTINUED | OUTPATIENT
Start: 2025-01-27 | End: 2025-01-27

## 2025-01-27 RX ADMIN — Medication 25 MILLIGRAM(S): at 05:43

## 2025-01-27 RX ADMIN — PANTOPRAZOLE 40 MILLIGRAM(S): 20 TABLET, DELAYED RELEASE ORAL at 17:13

## 2025-01-27 RX ADMIN — FLUTICASONE PROPIONATE AND SALMETEROL 1 DOSE(S): 113; 14 POWDER, METERED RESPIRATORY (INHALATION) at 17:13

## 2025-01-27 RX ADMIN — PANTOPRAZOLE 40 MILLIGRAM(S): 20 TABLET, DELAYED RELEASE ORAL at 05:43

## 2025-01-27 RX ADMIN — Medication 4: at 17:20

## 2025-01-27 RX ADMIN — Medication 2: at 07:55

## 2025-01-27 RX ADMIN — INSULIN GLARGINE-YFGN 22 UNIT(S): 100 INJECTION, SOLUTION SUBCUTANEOUS at 21:34

## 2025-01-27 RX ADMIN — FLUTICASONE PROPIONATE AND SALMETEROL 1 DOSE(S): 113; 14 POWDER, METERED RESPIRATORY (INHALATION) at 05:42

## 2025-01-27 RX ADMIN — TIOTROPIUM BROMIDE MONOHYDRATE 2 PUFF(S): 18 CAPSULE ORAL; RESPIRATORY (INHALATION) at 11:29

## 2025-01-27 RX ADMIN — ATORVASTATIN CALCIUM 20 MILLIGRAM(S): 80 TABLET, FILM COATED ORAL at 21:29

## 2025-01-27 RX ADMIN — RIVAROXABAN 15 MILLIGRAM(S): 20 TABLET, FILM COATED ORAL at 17:14

## 2025-01-27 RX ADMIN — BUMETANIDE 2 MILLIGRAM(S): 2 TABLET ORAL at 11:28

## 2025-01-27 RX ADMIN — TAMSULOSIN HYDROCHLORIDE 0.4 MILLIGRAM(S): 0.4 CAPSULE ORAL at 21:29

## 2025-01-27 RX ADMIN — IPRATROPIUM BROMIDE AND ALBUTEROL SULFATE 3 MILLILITER(S): .5; 2.5 SOLUTION RESPIRATORY (INHALATION) at 14:09

## 2025-01-27 RX ADMIN — Medication 2: at 11:28

## 2025-01-27 NOTE — CONSULT NOTE ADULT - REASON FOR ADMISSION
Acute on chronic combined HFpEF and HFrEF exacerbation, acute blood loss anemia secondary to suspected upper GI bleed

## 2025-01-27 NOTE — CONSULT NOTE ADULT - SUBJECTIVE AND OBJECTIVE BOX
HPI:  Arun Solares is an 79 year old male with PMHx of HTN, HLD, IDDM2, CAD (s/p CABG), pAF (on rivaroxaban), chronic combined HFpEF and HFrEF (LV EF 25-30% and grade III diastolic dysfunction on echo 8/9/24, s/p AICD placement), hx of severe aortic stenosis (s/p TAVR), chronic hypoxic respiratory failure secondary to COPD (baseline on 3-4L NC at home), CKD stage IV, and BPH who presented to the ED on 1/10/25 for complaints of shortness of breath.    History primarily obtained form wife at bedside. As per wife at bedside, patient is chronically short of breath and uses up to 4L NC continuously. However, for the past two weeks, he has been more short of breath and had increased fluid retention. States his left hand, abdomen, and leg have been very swollen. Sleeps on recliner as he is unable to lay flat. No paroxysmal nocturnal dyspnea. Reports intermittent productive cough with white/grey phlegm as well. Denies taking OTC antitussives. No recent travel or known sick contacts. Has been compliant with supplemental oxygen and diuretics. However, states he was discharged from [facility] in August 2025 with prescription of spironolactone 25 mg. Took 1-month supply and then stopped taking it as no other physician gave him another prescription for it. In addition, patient states he has had two episodes of black stools. Took ibuprofen a few days ago for hand spasms. Last took aspirin and xarelto today. States last colonoscopy was > 10 years ago and he had a few polyps removed at that time. Baseline functional status is ambulates unassisted and sometimes uses a walker. Independent with all ADLs. Lives at home with wife.     In the ED, VSS. No documented hypoxia but placed on BiPAP 15/6 on 100%. WBC 13.16K, hgb 6.5, sodium 132, BUN 61, Cr 2.71, blood glucose 256. Troponin WNL. Pro-BNP 4773. ABG 7.51 / 44 / 415 / 35 on BiPAP 15/6 on 100%. CXR personally reviewed; cardiomegaly, vascular congestion, blunting of L. costophrenic angle to suggest pleural effusion, appears unchanged from CXR 8/8/24. Received furosemide 40 mg IV. Evaluated by nephrology who recommends furosemide 40 mg IV daily given takes 40 mg PO BID at home. Requested ER physician to decrease FiO2 given O2 on ABG > 400.  (10 Tay 2025 18:09)    GI consult requested due to anemia and possible occult bleeding. His last colonoscopy was more than 10 yrs ago. He denies abdominal pain.  REVIEW OF SYSTEMS SOB      General:	    Skin/Breast:  	  Ophthalmologic:  	  ENMT:	    Respiratory and Thorax:  	  Cardiovascular:	    Gastrointestinal:	    Genitourinary:	    Musculoskeletal:	    Neurological:	    Psychiatric:	    Hematology/Lymphatics:	    Endocrine:	    Allergic/Immunologic:	    MEDICATIONS  (STANDING):  atorvastatin 20 milliGRAM(s) Oral at bedtime  buMETAnide 2 milliGRAM(s) Oral daily  dextrose 5%. 1000 milliLiter(s) (50 mL/Hr) IV Continuous <Continuous>  dextrose 5%. 1000 milliLiter(s) (100 mL/Hr) IV Continuous <Continuous>  dextrose 50% Injectable 25 Gram(s) IV Push once  dextrose 50% Injectable 12.5 Gram(s) IV Push once  dextrose 50% Injectable 25 Gram(s) IV Push once  fluticasone propionate/ salmeterol 250-50 MICROgram(s) Diskus 1 Dose(s) Inhalation two times a day  glucagon  Injectable 1 milliGRAM(s) IntraMuscular once  influenza  Vaccine (HIGH DOSE) 0.5 milliLiter(s) IntraMuscular once  insulin glargine Injectable (LANTUS) 22 Unit(s) SubCutaneous at bedtime  insulin lispro (ADMELOG) corrective regimen sliding scale   SubCutaneous three times a day before meals  metoprolol succinate ER 25 milliGRAM(s) Oral daily  pantoprazole    Tablet 40 milliGRAM(s) Oral two times a day  rivaroxaban 15 milliGRAM(s) Oral with dinner  tamsulosin 0.4 milliGRAM(s) Oral at bedtime  tiotropium 2.5 MICROgram(s) Inhaler 2 Puff(s) Inhalation daily    MEDICATIONS  (PRN):  acetaminophen     Tablet .. 650 milliGRAM(s) Oral every 6 hours PRN Temp greater or equal to 38C (100.4F), Mild Pain (1 - 3)  albuterol/ipratropium for Nebulization.. 3 milliLiter(s) Inhalation every 6 hours PRN -for shortness of breath and/or wheezing  dextrose Oral Gel 15 Gram(s) Oral once PRN Blood Glucose LESS THAN 70 milliGRAM(s)/deciliter      Vital Signs Last 24 Hrs  T(C): 36.8 (27 Jan 2025 16:08), Max: 37.5 (26 Jan 2025 23:48)  T(F): 98.3 (27 Jan 2025 16:08), Max: 99.5 (26 Jan 2025 23:48)  HR: 72 (27 Jan 2025 21:13) (69 - 72)  BP: 112/73 (27 Jan 2025 16:08) (111/66 - 121/74)  BP(mean): --  RR: 18 (27 Jan 2025 16:08) (18 - 18)  SpO2: 98% (27 Jan 2025 21:13) (96% - 98%)    Parameters below as of 26 Jan 2025 23:48  Patient On (Oxygen Delivery Method): nasal cannula  O2 Flow (L/min): 3      PHYSICAL EXAM:      Constitutional: in no distress    Eyes: normal    ENMT:    Neck: supple    Breasts:    Back:    Respiratory: normal    Cardiovascular: normal    Gastrointestinal: normal    Genitourinary:    Rectal:    Extremities:    Vascular:    Neurological:    Skin:    Lymph Nodes:    Musculoskeletal:    Psychiatric:            HEALTH ISSUES - PROBLEM Dx:  Acute on chronic combined systolic and diastolic congestive heart failure    Anemia due to acute blood loss    Upper GI bleed    HTN (hypertension)    HLD (hyperlipidemia)    Type 2 diabetes mellitus with hyperglycemia, with long-term current use of insulin    Unspecified atrial fibrillation    Chronic hypoxic respiratory failure, on home oxygen therapy    Stage 4 chronic kidney disease    CAD (coronary artery disease)    COPD without exacerbation    Heart failure    Weakness    Encounter for palliative care              Assesment & Plan: Severe anemia possibly due to occult GI bleeding. Resuscitate as indicated. Elective colonoscopy/ EGD when stable

## 2025-01-27 NOTE — PROGRESS NOTE ADULT - ASSESSMENT
79M        h/o   CAD /CABG,  HFrEF 25%, ICD,  c/c  Afib , COPD , on home o2,  HTN/ HLD. DM        c/p  sob          from   anemia requiring PRBC transfusion  per chart and  from acute on c/c  systolic chf          remains volume overloaded,   had metabolic alkalosis with CO2 retention      EF,  6/2024,   25,  diastolic  dysfunction,, mod  TR, mod  pHtn     Cardiomyopathy  AICD          card  following        s/p bumex drip-> lasix 80 iv-> off now 2/2 contraction alkalosis, renal recs appreciated, acetazolamide dosing       +Lt  brachial/  cephalic  V  phlebitis,  perchart , was on   iv heparin,   then  switched to Xarelto, home med    DENISHA on CKD. stage  3. renal d r shaye .  HCO3   high, but lessening     c/c  Afib, on xarelto    c/c  anemia,  hb in 8 range     DM, on lantus    obesity, BMI  35    lipitor, bumex,  torpol, lantus, xarelto     pt is dnr/dni   total  encounter   time  35 minutes       rad< from: TTE Echo Complete w/ Contrast w/ Doppler (08.08.24 @ 13:27) >  CONCLUSIONS:   1. Left ventricular cavity is normal in size. Left ventricular systolic   function is severely decreased with an ejection fraction visually   estimated at 25 to 30 %. Global left ventricular hypokinesis.   2. There is severe (grade 3) left ventricular diastolic dysfunction,   with elevated left ventricular filling pressure.   3. The right ventricle is not well visualized.   4. Device lead is visualized in the right heart.   5. No significant valvular disease.   6. No pericardial effusion seen.   7. Estimated pulmonary artery systolic pressure is 53 mmHg, consistent   with moderate pulmonary hypertension.   8. Left and moderate left pleural effusion noted.   9. Mild pulmonic regurgitation.  10. Moderate tricuspid regurgitation______________________________________________________________________  ____________  FINDINGS:  < end of copied text >           rad

## 2025-01-27 NOTE — PROGRESS NOTE ADULT - ASSESSMENT
79M with CAD/CABG, HFrEF 25%, ICD, afib, copd on home o2, htn hld dm p/w adhf, acute anemia requiring PRBC transfusion     remains volume overloaded, however metabolic alkalosis with CO2 retention precluded further diuresis, now restarting on Bumex po as per renal     s/p bumex drip-> lasix 80 iv-> d/d'd 2/2 contraction alkalosis, renal recs appreciated, s/p acetazolamide dosing, bicarb now improved to 34, now restarted on Bumex 2mg po daily    monitor renal function, electrolytes, bicarb  aim to keep k ~4.5 with contraction alkalosis by supplementing with KCl  cont on Toprol 25/d  add further GDMT for cardiomyopathy as BP and renal function allows  +Lt UE DVT - on AC,  iv heparin now switched to Xarelto, home med

## 2025-01-27 NOTE — PROGRESS NOTE ADULT - SUBJECTIVE AND OBJECTIVE BOX
Patient is a 79y old  Male who presents with a chief complaint of worsening sob    PAST MEDICAL & SURGICAL HISTORY:  HTN (hypertension)    COPD (chronic obstructive pulmonary disease)    HLD (hyperlipidemia)    CAD    Insulin dependent type 2 diabetes mellitus    Chronic hypoxic respiratory failure, on home oxygen therapy    Stage 4 chronic kidney disease    Chronic combined systolic and diastolic heart failure    Unspecified atrial fibrillation    S/P CABG x 3   in 2003    S/P hernia repair  hernia [id number]    S/P implantation of automatic cardioverter/defibrillator (AICD)    INTERVAL HISTORY: mild dyspnea, + hypervolemia   	  MEDICATIONS:  MEDICATIONS  (STANDING):  atorvastatin 20 milliGRAM(s) Oral at bedtime  buMETAnide 2 milliGRAM(s) Oral daily  fluticasone propionate/ salmeterol 250-50 MICROgram(s) Diskus 1 Dose(s) Inhalation two times a day  insulin glargine Injectable (LANTUS) 22 Unit(s) SubCutaneous at bedtime  insulin lispro (ADMELOG) corrective regimen sliding scale   SubCutaneous three times a day before meals  metoprolol succinate ER 25 milliGRAM(s) Oral daily  pantoprazole    Tablet 40 milliGRAM(s) Oral two times a day  rivaroxaban 15 milliGRAM(s) Oral with dinner  tamsulosin 0.4 milliGRAM(s) Oral at bedtime  tiotropium 2.5 MICROgram(s) Inhaler 2 Puff(s) Inhalation daily    MEDICATIONS  (PRN):  acetaminophen     Tablet .. 650 milliGRAM(s) Oral every 6 hours PRN Temp greater or equal to 38C (100.4F), Mild Pain (1 - 3)  albuterol/ipratropium for Nebulization.. 3 milliLiter(s) Inhalation every 6 hours PRN -for shortness of breath and/or wheezing  dextrose Oral Gel 15 Gram(s) Oral once PRN Blood Glucose LESS THAN 70 milliGRAM(s)/deciliter    Vitals:  T(F): 97.4 (01-27-25 @ 11:08), Max: 99.5 (01-26-25 @ 23:48)  HR: 71 (01-27-25 @ 11:08) (70 - 71)  BP: 111/66 (01-27-25 @ 11:08) (108/63 - 121/74)  RR: 18 (01-27-25 @ 11:08) (17 - 18)  SpO2: 98% (01-27-25 @ 11:08) (92% - 98%)    01-27 @ 07:01  -  01-27 @ 15:40  --------------------------------------------------------  IN:  Total IN: 0 mL    OUT:    Voided (mL): 600 mL  Total OUT: 600 mL    Total NET: -600 mL    PHYSICAL EXAM:  Neuro: Awake, responsive  CV: S1 S2 RRR  Lungs: diminished to bases   GI: Softly distended, BS +, NT, abd wall edema  Extremities: LE edema, +Anasarca     RADIOLOGY:     DIAGNOSTIC< from: Xray Chest 1 View-PORTABLE IMMEDIATE (Xray Chest 1 View-PORTABLE IMMEDIATE .) (01.25.25 @ 10:52) >  FINDINGS/  IMPRESSION: There is persistent cardiomegaly. Poststernotomy, CABG and   TAVR. Mild pulmonary venous congestive changes with persistent hazy   opacity over the left lower lung zone. Left-sided pleural effusion, left   lower lung zone pneumonia and/or atelectasis cannot be excluded.    < end of copied text >   TESTING:    [x ] Echocardiogram: < from: TTE Echo Complete w/ Contrast w/ Doppler (08.08.24 @ 13:27) >   1. Left ventricular cavity is normal in size. Left ventricular systolic   function is severely decreased with an ejection fraction visually   estimated at 25 to 30 %. Global left ventricular hypokinesis.   2. There is severe (grade 3) left ventricular diastolic dysfunction,   with elevated left ventricular filling pressure.   3. The right ventricle is not well visualized.   4. Device lead is visualized in the right heart.   6. No pericardial effusion seen.   7. Estimated pulmonary artery systolic pressure is 53 mmHg, consistent   with moderate pulmonary hypertension.   8. Left and moderate left pleural effusion noted.   9. Mild pulmonic regurgitation.  10. Moderate tricuspid regurgitation.    < end of copied text >     from: NM Pharm Stress Test, Dual Isotope (03.17.21 @ 12:40) >    1. There was scintigraphic evidence for a myocardial infarct in the RCA territory with no significant ischemia.    2. There is severely abnormal left ventricular contractility, globally diminished calculated ejection fraction and no wall motion abnormlaities. Overall post stress ejection fraction was 21%    < end of copied text >    LABS:	 	    27 Jan 2025 08:15    134    |  94     |  70     ----------------------------<  203    4.1     |  34     |  3.31   26 Jan 2025 06:15    137    |  94     |  60     ----------------------------<  194    3.5     |  38     |  2.93   25 Jan 2025 08:30    138    |  94     |  58     ----------------------------<  162    3.5     |  35     |  2.81     Ca    8.9        27 Jan 2025 08:15  Mg     2.4       27 Jan 2025 08:15    TPro  6.8    /  Alb  2.9    /  TBili  1.0    /  DBili  x      /  AST  30     /  ALT  23     /  AlkPhos  208    26 Jan 2025 06:15                        8.7    9.04  )-----------( 185      ( 27 Jan 2025 08:15 )             30.7 ,                       9.2    9.75  )-----------( 177      ( 26 Jan 2025 06:15 )             33.0 ,                       8.5    9.79  )-----------( 159      ( 25 Jan 2025 08:30 )             30.6   pro-BNP: Pro-Brain Natriuretic Peptide: 4647 pg/mL (01.22.25 @ 06:31)

## 2025-01-27 NOTE — CONSULT NOTE ADULT - CONSULT REASON
HFrEF, COPD, respiratory failure, CKD, GIB, GOC
Occult GI bleeding
DENISHA CKD fluid overload
black stools; hgb 6.5

## 2025-01-27 NOTE — PROGRESS NOTE ADULT - SUBJECTIVE AND OBJECTIVE BOX
date of service: 01-27-25 @ 12:28  [facility]  REVIEW OF SYSTEMS:  CONSTITUTIONAL: No fever,  no  weight loss  ENT:  No  tinnitus,   no   vertigo  NECK: No pain or stiffness  RESPIRATORY: No cough, wheezing, chills or hemoptysis;    No Shortness of Breath  CARDIOVASCULAR: No chest pain, palpitations, dizziness  GASTROINTESTINAL: No abdominal or epigastric pain. No nausea, vomiting, or hematemesis; No diarrhea  No melena or hematochezia.  GENITOURINARY: No dysuria, frequency, hematuria, or incontinence  NEUROLOGICAL: No headaches  SKIN: No itching,  no   rash  LYMPH Nodes: No enlarged glands  ENDOCRINE: No heat or cold intolerance  MUSCULOSKELETAL: No joint pain or swelling  PSYCHIATRIC: No depression, anxiety  HEME/LYMPH: No easy bruising, or bleeding gums  ALLERGY AND IMMUNOLOGIC: No hives or eczema	    MEDICATIONS  (STANDING):  atorvastatin 20 milliGRAM(s) Oral at bedtime  buMETAnide 2 milliGRAM(s) Oral daily  dextrose 5%. 1000 milliLiter(s) (50 mL/Hr) IV Continuous <Continuous>  dextrose 5%. 1000 milliLiter(s) (100 mL/Hr) IV Continuous <Continuous>  dextrose 50% Injectable 25 Gram(s) IV Push once  dextrose 50% Injectable 12.5 Gram(s) IV Push once  dextrose 50% Injectable 25 Gram(s) IV Push once  fluticasone propionate/ salmeterol 250-50 MICROgram(s) Diskus 1 Dose(s) Inhalation two times a day  glucagon  Injectable 1 milliGRAM(s) IntraMuscular once  influenza  Vaccine (HIGH DOSE) 0.5 milliLiter(s) IntraMuscular once  insulin glargine Injectable (LANTUS) 22 Unit(s) SubCutaneous at bedtime  insulin lispro (ADMELOG) corrective regimen sliding scale   SubCutaneous three times a day before meals  metoprolol succinate ER 25 milliGRAM(s) Oral daily  pantoprazole    Tablet 40 milliGRAM(s) Oral two times a day  rivaroxaban 15 milliGRAM(s) Oral with dinner  tamsulosin 0.4 milliGRAM(s) Oral at bedtime  tiotropium 2.5 MICROgram(s) Inhaler 2 Puff(s) Inhalation daily    MEDICATIONS  (PRN):  acetaminophen     Tablet .. 650 milliGRAM(s) Oral every 6 hours PRN Temp greater or equal to 38C (100.4F), Mild Pain (1 - 3)  albuterol/ipratropium for Nebulization.. 3 milliLiter(s) Inhalation every 6 hours PRN -for shortness of breath and/or wheezing  aluminum hydroxide/magnesium hydroxide/simethicone Suspension 30 milliLiter(s) Oral every 4 hours PRN Dyspepsia  dextrose Oral Gel 15 Gram(s) Oral once PRN Blood Glucose LESS THAN 70 milliGRAM(s)/deciliter  ondansetron Injectable 4 milliGRAM(s) IV Push every 8 hours PRN Nausea and/or Vomiting      Vital Signs Last 24 Hrs  T(C): 36.3 (27 Jan 2025 11:08), Max: 37.5 (26 Jan 2025 23:48)  T(F): 97.4 (27 Jan 2025 11:08), Max: 99.5 (26 Jan 2025 23:48)  HR: 71 (27 Jan 2025 11:08) (70 - 71)  BP: 111/66 (27 Jan 2025 11:08) (108/63 - 121/74)  BP(mean): --  RR: 18 (27 Jan 2025 11:08) (17 - 18)  SpO2: 98% (27 Jan 2025 11:08) (92% - 98%)    Parameters below as of 26 Jan 2025 23:48  Patient On (Oxygen Delivery Method): nasal cannula  O2 Flow (L/min): 3    CAPILLARY BLOOD GLUCOSE      POCT Blood Glucose.: 220 mg/dL (27 Jan 2025 11:01)  POCT Blood Glucose.: 219 mg/dL (27 Jan 2025 07:28)  POCT Blood Glucose.: 388 mg/dL (26 Jan 2025 21:07)  POCT Blood Glucose.: 288 mg/dL (26 Jan 2025 16:51)    I&O's Summary        Appearance: Normal	  HEENT:   Normal oral mucosa, PERRL, EOMI	  Lymphatic: No lymphadenopathy  Cardiovascular: Normal S1 S2, No JVD  Respiratory: Lungs clear to auscultation	  Gastrointestinal:  Soft, Non-tender, + BS	  Skin: No rash, No ecchymoses	  Extremities:     LABS:                        8.7    9.04  )-----------( 185      ( 27 Jan 2025 08:15 )             30.7     01-27    134[L]  |  94[L]  |  70[H]  ----------------------------<  203[H]  4.1   |  34[H]  |  3.31[H]    Ca    8.9      27 Jan 2025 08:15  Mg     2.4     01-27    TPro  6.8  /  Alb  2.9[L]  /  TBili  1.0  /  DBili  x   /  AST  30  /  ALT  23  /  AlkPhos  208[H]  01-26          Urinalysis Basic - ( 27 Jan 2025 08:15 )    Color: x / Appearance: x / SG: x / pH: x  Gluc: 203 mg/dL / Ketone: x  / Bili: x / Urobili: x   Blood: x / Protein: x / Nitrite: x   Leuk Esterase: x / RBC: x / WBC x   Sq Epi: x / Non Sq Epi: x / Bacteria: x          01-26 @ 06:29  12.1  60      Thyroid Stimulating Hormone, Serum: 2.850 uIU/mL (01-22 @ 06:31)          Consultant(s) Notes Reviewed:      Care Discussed with Consultants/Other Providers:

## 2025-01-27 NOTE — PROGRESS NOTE ADULT - SUBJECTIVE AND OBJECTIVE BOX
[facility] NEPHROLOGY SERVICES, [facility]  NEPHROLOGY AND HYPERTENSION  [address]  [address]  [place] [address]  [phone]    MD DANIEL GARRETT MD YELENA ROSENBERG, MD BINNY KOSHY, MD CHRISTOPHER CAPUTO, MD EDWARD BOVER, MD          Patient events noted    MEDICATIONS  (STANDING):  atorvastatin 20 milliGRAM(s) Oral at bedtime  dextrose 5%. 1000 milliLiter(s) (50 mL/Hr) IV Continuous <Continuous>  dextrose 5%. 1000 milliLiter(s) (50 mL/Hr) IV Continuous <Continuous>  dextrose 5%. 1000 milliLiter(s) (100 mL/Hr) IV Continuous <Continuous>  dextrose 50% Injectable 25 Gram(s) IV Push once  dextrose 50% Injectable 12.5 Gram(s) IV Push once  dextrose 50% Injectable 25 Gram(s) IV Push once  fluticasone propionate/ salmeterol 250-50 MICROgram(s) Diskus 1 Dose(s) Inhalation two times a day  furosemide Infusion 30 mG/Hr (15 mL/Hr) IV Continuous <Continuous>  glucagon  Injectable 1 milliGRAM(s) IntraMuscular once  influenza  Vaccine (HIGH DOSE) 0.5 milliLiter(s) IntraMuscular once  insulin glargine Injectable (LANTUS) 28 Unit(s) SubCutaneous at bedtime  insulin lispro (ADMELOG) corrective regimen sliding scale   SubCutaneous three times a day before meals  metolazone 10 milliGRAM(s) Oral daily  metoprolol succinate ER 25 milliGRAM(s) Oral daily  pantoprazole    Tablet 40 milliGRAM(s) Oral two times a day  rivaroxaban 15 milliGRAM(s) Oral with dinner  tamsulosin 0.4 milliGRAM(s) Oral at bedtime  tiotropium 2.5 MICROgram(s) Inhaler 2 Puff(s) Inhalation daily    MEDICATIONS  (PRN):  acetaminophen     Tablet .. 650 milliGRAM(s) Oral every 6 hours PRN Temp greater or equal to 38C (100.4F), Mild Pain (1 - 3)  albuterol/ipratropium for Nebulization.. 3 milliLiter(s) Inhalation every 6 hours PRN -for shortness of breath and/or wheezing  dextrose Oral Gel 15 Gram(s) Oral once PRN Blood Glucose LESS THAN 70 milliGRAM(s)/deciliter      01-28-25 @ 07:01  -  01-29-25 @ 07:00  --------------------------------------------------------  IN: 480 mL / OUT: 1400 mL / NET: -920 mL    01-29-25 @ 07:01 - 01-29-25 @ 19:17  --------------------------------------------------------  IN: 360 mL / OUT: 0 mL / NET: 360 mL      PHYSICAL EXAM:      Lungs clear  Heart S1S2  Abd soft obese   Ext 2-3 +                  Assessment   DENISHA CKD 3-4 fluid overload     Plan:  Will resume diuretic regimen  Discussed with patient and wife regarding the prospect of HD  Would explore all options prior to agreeing    Jon Rowe MD

## 2025-01-28 LAB
ANION GAP SERPL CALC-SCNC: 8 MMOL/L — SIGNIFICANT CHANGE UP (ref 5–17)
BUN SERPL-MCNC: 66 MG/DL — HIGH (ref 7–23)
CALCIUM SERPL-MCNC: 9.1 MG/DL — SIGNIFICANT CHANGE UP (ref 8.5–10.1)
CHLORIDE SERPL-SCNC: 93 MMOL/L — LOW (ref 96–108)
CO2 SERPL-SCNC: 34 MMOL/L — HIGH (ref 22–31)
CREAT SERPL-MCNC: 3.09 MG/DL — HIGH (ref 0.5–1.3)
EGFR: 20 ML/MIN/1.73M2 — LOW
GLUCOSE BLDC GLUCOMTR-MCNC: 287 MG/DL — HIGH (ref 70–99)
GLUCOSE BLDC GLUCOMTR-MCNC: 296 MG/DL — HIGH (ref 70–99)
GLUCOSE BLDC GLUCOMTR-MCNC: 303 MG/DL — HIGH (ref 70–99)
GLUCOSE BLDC GLUCOMTR-MCNC: 315 MG/DL — HIGH (ref 70–99)
GLUCOSE BLDC GLUCOMTR-MCNC: 336 MG/DL — HIGH (ref 70–99)
GLUCOSE BLDC GLUCOMTR-MCNC: 349 MG/DL — HIGH (ref 70–99)
GLUCOSE SERPL-MCNC: 261 MG/DL — HIGH (ref 70–99)
HCT VFR BLD CALC: 32.2 % — LOW (ref 39–50)
HGB BLD-MCNC: 9.1 G/DL — LOW (ref 13–17)
MCHC RBC-ENTMCNC: 26.6 PG — LOW (ref 27–34)
MCHC RBC-ENTMCNC: 28.3 G/DL — LOW (ref 32–36)
MCV RBC AUTO: 94.2 FL — SIGNIFICANT CHANGE UP (ref 80–100)
NRBC # BLD: 0 /100 WBCS — SIGNIFICANT CHANGE UP (ref 0–0)
NRBC BLD-RTO: 0 /100 WBCS — SIGNIFICANT CHANGE UP (ref 0–0)
NT-PROBNP SERPL-SCNC: 7384 PG/ML — HIGH (ref 0–450)
PLATELET # BLD AUTO: 195 K/UL — SIGNIFICANT CHANGE UP (ref 150–400)
POTASSIUM SERPL-MCNC: 3.7 MMOL/L — SIGNIFICANT CHANGE UP (ref 3.5–5.3)
POTASSIUM SERPL-SCNC: 3.7 MMOL/L — SIGNIFICANT CHANGE UP (ref 3.5–5.3)
RBC # BLD: 3.42 M/UL — LOW (ref 4.2–5.8)
RBC # FLD: 27.7 % — HIGH (ref 10.3–14.5)
SODIUM SERPL-SCNC: 135 MMOL/L — SIGNIFICANT CHANGE UP (ref 135–145)
WBC # BLD: 9.62 K/UL — SIGNIFICANT CHANGE UP (ref 3.8–10.5)
WBC # FLD AUTO: 9.62 K/UL — SIGNIFICANT CHANGE UP (ref 3.8–10.5)

## 2025-01-28 PROCEDURE — 99233 SBSQ HOSP IP/OBS HIGH 50: CPT

## 2025-01-28 PROCEDURE — 99232 SBSQ HOSP IP/OBS MODERATE 35: CPT

## 2025-01-28 RX ORDER — DM/PSEUDOEPHED/ACETAMINOPHEN 10-30-250
15 CAPSULE ORAL ONCE
Refills: 0 | Status: DISCONTINUED | OUTPATIENT
Start: 2025-01-28 | End: 2025-01-30

## 2025-01-28 RX ORDER — DM/PSEUDOEPHED/ACETAMINOPHEN 10-30-250
12.5 CAPSULE ORAL ONCE
Refills: 0 | Status: DISCONTINUED | OUTPATIENT
Start: 2025-01-28 | End: 2025-01-30

## 2025-01-28 RX ORDER — INSULIN GLARGINE-YFGN 100 [IU]/ML
28 INJECTION, SOLUTION SUBCUTANEOUS AT BEDTIME
Refills: 0 | Status: DISCONTINUED | OUTPATIENT
Start: 2025-01-28 | End: 2025-01-30

## 2025-01-28 RX ORDER — DM/PSEUDOEPHED/ACETAMINOPHEN 10-30-250
25 CAPSULE ORAL ONCE
Refills: 0 | Status: DISCONTINUED | OUTPATIENT
Start: 2025-01-28 | End: 2025-01-30

## 2025-01-28 RX ORDER — SODIUM CHLORIDE 9 G/ML
1000 INJECTION, SOLUTION INTRAVENOUS
Refills: 0 | Status: DISCONTINUED | OUTPATIENT
Start: 2025-01-28 | End: 2025-01-30

## 2025-01-28 RX ORDER — INSULIN LISPRO 100/ML
VIAL (ML) SUBCUTANEOUS
Refills: 0 | Status: DISCONTINUED | OUTPATIENT
Start: 2025-01-28 | End: 2025-01-30

## 2025-01-28 RX ORDER — INSULIN LISPRO 100/ML
4 VIAL (ML) SUBCUTANEOUS ONCE
Refills: 0 | Status: COMPLETED | OUTPATIENT
Start: 2025-01-28 | End: 2025-01-28

## 2025-01-28 RX ADMIN — Medication 15 MG/HR: at 22:18

## 2025-01-28 RX ADMIN — Medication 80 MILLIGRAM(S): at 05:01

## 2025-01-28 RX ADMIN — Medication 25 MILLIGRAM(S): at 05:02

## 2025-01-28 RX ADMIN — PANTOPRAZOLE 40 MILLIGRAM(S): 20 TABLET, DELAYED RELEASE ORAL at 17:27

## 2025-01-28 RX ADMIN — PANTOPRAZOLE 40 MILLIGRAM(S): 20 TABLET, DELAYED RELEASE ORAL at 05:02

## 2025-01-28 RX ADMIN — Medication 80 MILLIGRAM(S): at 14:37

## 2025-01-28 RX ADMIN — RIVAROXABAN 15 MILLIGRAM(S): 20 TABLET, FILM COATED ORAL at 17:27

## 2025-01-28 RX ADMIN — TAMSULOSIN HYDROCHLORIDE 0.4 MILLIGRAM(S): 0.4 CAPSULE ORAL at 21:36

## 2025-01-28 RX ADMIN — FLUTICASONE PROPIONATE AND SALMETEROL 1 DOSE(S): 113; 14 POWDER, METERED RESPIRATORY (INHALATION) at 05:01

## 2025-01-28 RX ADMIN — Medication 4 UNIT(S): at 12:44

## 2025-01-28 RX ADMIN — Medication 15 MG/HR: at 22:22

## 2025-01-28 RX ADMIN — INSULIN GLARGINE-YFGN 28 UNIT(S): 100 INJECTION, SOLUTION SUBCUTANEOUS at 21:34

## 2025-01-28 RX ADMIN — Medication 3: at 17:26

## 2025-01-28 RX ADMIN — TIOTROPIUM BROMIDE MONOHYDRATE 2 PUFF(S): 18 CAPSULE ORAL; RESPIRATORY (INHALATION) at 12:34

## 2025-01-28 RX ADMIN — IPRATROPIUM BROMIDE AND ALBUTEROL SULFATE 3 MILLILITER(S): .5; 2.5 SOLUTION RESPIRATORY (INHALATION) at 05:15

## 2025-01-28 RX ADMIN — Medication 3: at 08:36

## 2025-01-28 RX ADMIN — FLUTICASONE PROPIONATE AND SALMETEROL 1 DOSE(S): 113; 14 POWDER, METERED RESPIRATORY (INHALATION) at 17:26

## 2025-01-28 RX ADMIN — IPRATROPIUM BROMIDE AND ALBUTEROL SULFATE 3 MILLILITER(S): .5; 2.5 SOLUTION RESPIRATORY (INHALATION) at 18:28

## 2025-01-28 RX ADMIN — ATORVASTATIN CALCIUM 20 MILLIGRAM(S): 80 TABLET, FILM COATED ORAL at 21:33

## 2025-01-28 NOTE — PROGRESS NOTE ADULT - ASSESSMENT
79M        h/o   CAD /CABG,  HFrEF 25%, ICD,  c/c  Afib , COPD , on home o2,  HTN/ HLD. DM        c/p  sob          from   anemia requiring PRBC transfusion  per chart and  from acute on c/c  systolic chf          remains volume overloaded,   had metabolic alkalosis with CO2 retention      EF,  6/2024,   25,  diastolic  dysfunction,, mod  TR, mod  pHtn     Cardiomyopathy  AICD          card  following        s/p bumex drip-> lasix 80 iv-> off now 2/2 contraction alkalosis, renal recs appreciated, acetazolamide dosing       +Lt  brachial/  cephalic  V  phlebitis,  perchart , was on   iv heparin,   then  switched to Xarelto, home med    DENISHA on CKD. stage  3. renal d r shaye .  HCO3   high, but lessening     c/c  Afib, on xarelto    c/c  anemia,  hb in 8 range. seen by  gi     DM, on lantus    hypergycemia    obesity, BMI  35    lipitor, bumex,  torpol, lantus, xarelto.   still has   edema.  pe r card,  clark s lessened     crt  is  3  now       pt is dnr/dni   total  encounter   time  35 minutes       rad< from: TTE Echo Complete w/ Contrast w/ Doppler (08.08.24 @ 13:27) >  CONCLUSIONS:   1. Left ventricular cavity is normal in size. Left ventricular systolic   function is severely decreased with an ejection fraction visually   estimated at 25 to 30 %. Global left ventricular hypokinesis.   2. There is severe (grade 3) left ventricular diastolic dysfunction,   with elevated left ventricular filling pressure.   3. The right ventricle is not well visualized.   4. Device lead is visualized in the right heart.   5. No significant valvular disease.   6. No pericardial effusion seen.   7. Estimated pulmonary artery systolic pressure is 53 mmHg, consistent   with moderate pulmonary hypertension.   8. Left and moderate left pleural effusion noted.   9. Mild pulmonic regurgitation.  10. Moderate tricuspid regurgitation______________________________________________________________________  ____________  FINDINGS:  < end of copied text >           rad   79M        h/o   CAD /CABG,  HFrEF 25%, ICD,  c/c  Afib , COPD , on home o2,  HTN/ HLD. DM        c/p  sob          from   anemia requiring PRBC transfusion  per chart and  from acute on c/c  systolic chf          remains volume overloaded,   had metabolic alkalosis with CO2 retention      EF,  6/2024,   25,  diastolic  dysfunction,, mod  TR, mod  pHtn     Cardiomyopathy  AICD          card  following        s/p bumex drip-> lasix 80 iv-> off now 2/2 contraction alkalosis, renal recs appreciated, acetazolamide dosing       +Lt  brachial/  cephalic  V  phlebitis,  perchart , was on   iv heparin,   then  switched to Xarelto, home med    DENISHA on CKD. stage  3. renal d r shaye .  HCO3   high, but lessening     c/c  Afib, on xarelto    c/c  anemia,  hb in 8 range. seen by  gi     DM, on lantus    hypergycemia    obesity, BMI  35    lipitor, bumex,  torpol, lantus, xarelto.   still has   edema.  pe r card,  clark s lessened     crt  is  3  now.  pe r card  on iv lasix  tid  and  xaroxyln       pt is dnr/dni   total  encounter   time  35 minutes       rad< from: TTE Echo Complete w/ Contrast w/ Doppler (08.08.24 @ 13:27) >  CONCLUSIONS:   1. Left ventricular cavity is normal in size. Left ventricular systolic   function is severely decreased with an ejection fraction visually   estimated at 25 to 30 %. Global left ventricular hypokinesis.   2. There is severe (grade 3) left ventricular diastolic dysfunction,   with elevated left ventricular filling pressure.   3. The right ventricle is not well visualized.   4. Device lead is visualized in the right heart.   5. No significant valvular disease.   6. No pericardial effusion seen.   7. Estimated pulmonary artery systolic pressure is 53 mmHg, consistent   with moderate pulmonary hypertension.   8. Left and moderate left pleural effusion noted.   9. Mild pulmonic regurgitation.  10. Moderate tricuspid regurgitation______________________________________________________________________  ____________  FINDINGS:  < end of copied text >           rad

## 2025-01-28 NOTE — PROGRESS NOTE ADULT - SUBJECTIVE AND OBJECTIVE BOX
Patient is a 79y old  Male who presents with a chief complaint of worsening sob    PAST MEDICAL & SURGICAL HISTORY:  HTN (hypertension)    COPD (chronic obstructive pulmonary disease)    HLD (hyperlipidemia)    Insulin dependent type 2 diabetes mellitus    Chronic hypoxic respiratory failure, on home oxygen therapy    Stage 4 chronic kidney disease    Chronic combined systolic and diastolic heart failure    Unspecified atrial fibrillation    S/P CABG x 3  cabg3  in 2003    S/P hernia repair  hernia [id number]    S/P implantation of automatic cardioverter/defibrillator (AICD)    INTERVAL HISTORY:  	  MEDICATIONS:  MEDICATIONS  (STANDING):  atorvastatin 20 milliGRAM(s) Oral at bedtime  fluticasone propionate/ salmeterol 250-50 MICROgram(s) Diskus 1 Dose(s) Inhalation two times a day  furosemide   Injectable 80 milliGRAM(s) IV Push every 8 hours  insulin glargine Injectable (LANTUS) 22 Unit(s) SubCutaneous at bedtime  insulin lispro (ADMELOG) corrective regimen sliding scale   SubCutaneous three times a day before meals  metolazone 10 milliGRAM(s) Oral daily  metoprolol succinate ER 25 milliGRAM(s) Oral daily  pantoprazole    Tablet 40 milliGRAM(s) Oral two times a day  rivaroxaban 15 milliGRAM(s) Oral with dinner  tamsulosin 0.4 milliGRAM(s) Oral at bedtime  tiotropium 2.5 MICROgram(s) Inhaler 2 Puff(s) Inhalation daily    MEDICATIONS  (PRN):  acetaminophen     Tablet .. 650 milliGRAM(s) Oral every 6 hours PRN Temp greater or equal to 38C (100.4F), Mild Pain (1 - 3)  albuterol/ipratropium for Nebulization.. 3 milliLiter(s) Inhalation every 6 hours PRN -for shortness of breath and/or wheezing  dextrose Oral Gel 15 Gram(s) Oral once PRN Blood Glucose LESS THAN 70 milliGRAM(s)/deciliter    Vitals:  T(F): 97.1 (01-28-25 @ 04:50), Max: 98.3 (01-27-25 @ 16:08)  HR: 69 (01-28-25 @ 08:29) (69 - 72)  BP: 139/76 (01-28-25 @ 04:50) (111/66 - 139/76)  RR: 18 (01-28-25 @ 04:50) (18 - 18)  SpO2: 98% (01-28-25 @ 08:29) (95% - 100%)  Wt(kg): --110.9    01-27 @ 07:01  -  01-28 @ 07:00  --------------------------------------------------------  IN:    Oral Fluid: 710 mL  Total IN: 710 mL    OUT:    Voided (mL): 600 mL  Total OUT: 600 mL    Total NET: 110 mL    PHYSICAL EXAM:  Neuro: Awake, responsive  CV: S1 S2 RRR  Lungs:   GI: Softly distended, BS +,  NT, abd wall edema   Extremities: + edema, + anasarca     RADIOLOGY: < from: Xray Chest 1 View-PORTABLE IMMEDIATE (Xray Chest 1 View-PORTABLE IMMEDIATE .) (01.25.25 @ 10:52) >  IMPRESSION: There is persistent cardiomegaly. Poststernotomy, CABG and   TAVR. Mild pulmonary venous congestive changes with persistent hazy   opacity over the left lower lung zone. Left-sided pleural effusion, left   lower lung zone pneumonia and/or atelectasis cannot be excluded.    < end of copied text >    DIAGNOSTIC TESTING:    [x ] Echocardiogram: < from: TTE Echo Complete w/ Contrast w/ Doppler (08.08.24 @ 13:27) >   1. Left ventricular cavity is normal in size. Left ventricular systolic   function is severely decreased with an ejection fraction visually   estimated at 25 to 30 %. Global left ventricular hypokinesis.   2. There is severe (grade 3) left ventricular diastolic dysfunction,   with elevated left ventricular filling pressure.   3. The right ventricle is not well visualized.   4. Device lead is visualized in the right heart.   5. No significant valvular disease.   6. No pericardial effusion seen.   7. Estimated pulmonary artery systolic pressure is 53 mmHg, consistent   with moderate pulmonary hypertension.   8. Left and moderate left pleural effusion noted.   9. Mild pulmonic regurgitation.  10. Moderate tricuspid regurgitation.    < end of copied text >    < from: NM Pharm Stress Test, Dual Isotope (03.17.21 @ 12:40) >    1. There was scintigraphic evidence for a myocardial infarct in the RCA territory with no significant ischemia.    2. There is severely abnormal left ventricular contractility, globally diminished calculated ejection fraction and no wall motion abnormlaities. Overall post stress ejection fraction was 21%    LABS:	 	    28 Jan 2025 06:06    135    |  93     |  66     ----------------------------<  261    3.7     |  34     |  3.09   27 Jan 2025 08:15    134    |  94     |  70     ----------------------------<  203    4.1     |  34     |  3.31   26 Jan 2025 06:15    137    |  94     |  60     ----------------------------<  194    3.5     |  38     |  2.93     Ca    9.1        28 Jan 2025 06:06  Mg     2.4       27 Jan 2025 08:15                        9.1    9.62  )-----------( 195      ( 28 Jan 2025 06:06 )             32.2 ,                       8.7    9.04  )-----------( 185      ( 27 Jan 2025 08:15 )             30.7 ,                       9.2    9.75  )-----------( 177      ( 26 Jan 2025 06:15 )             33.0   pro-BNP: Pro-Brain Natriuretic Peptide: 4647 pg/mL (01.22.25 @ 06:31)                     Patient is a 79y old  Male who presents with a chief complaint of worsening sob    PAST MEDICAL & SURGICAL HISTORY:  HTN (hypertension)    COPD (chronic obstructive pulmonary disease)    HLD (hyperlipidemia)    Insulin dependent type 2 diabetes mellitus    Chronic hypoxic respiratory failure, on home oxygen therapy    Stage 4 chronic kidney disease    Chronic combined systolic and diastolic heart failure    Unspecified atrial fibrillation    S/P CABG x 3  cabg3  in 2003    S/P hernia repair  hernia [id number]    S/P implantation of automatic cardioverter/defibrillator (AICD)    INTERVAL HISTORY: mild dyspnea, + hypervolemia   	  MEDICATIONS:  MEDICATIONS  (STANDING):  atorvastatin 20 milliGRAM(s) Oral at bedtime  fluticasone propionate/ salmeterol 250-50 MICROgram(s) Diskus 1 Dose(s) Inhalation two times a day  furosemide   Injectable 80 milliGRAM(s) IV Push every 8 hours  insulin glargine Injectable (LANTUS) 22 Unit(s) SubCutaneous at bedtime  insulin lispro (ADMELOG) corrective regimen sliding scale   SubCutaneous three times a day before meals  metolazone 10 milliGRAM(s) Oral daily  metoprolol succinate ER 25 milliGRAM(s) Oral daily  pantoprazole    Tablet 40 milliGRAM(s) Oral two times a day  rivaroxaban 15 milliGRAM(s) Oral with dinner  tamsulosin 0.4 milliGRAM(s) Oral at bedtime  tiotropium 2.5 MICROgram(s) Inhaler 2 Puff(s) Inhalation daily    MEDICATIONS  (PRN):  acetaminophen     Tablet .. 650 milliGRAM(s) Oral every 6 hours PRN Temp greater or equal to 38C (100.4F), Mild Pain (1 - 3)  albuterol/ipratropium for Nebulization.. 3 milliLiter(s) Inhalation every 6 hours PRN -for shortness of breath and/or wheezing  dextrose Oral Gel 15 Gram(s) Oral once PRN Blood Glucose LESS THAN 70 milliGRAM(s)/deciliter    Vitals:  T(F): 97.1 (01-28-25 @ 04:50), Max: 98.3 (01-27-25 @ 16:08)  HR: 69 (01-28-25 @ 08:29) (69 - 72)  BP: 139/76 (01-28-25 @ 04:50) (111/66 - 139/76)  RR: 18 (01-28-25 @ 04:50) (18 - 18)  SpO2: 98% (01-28-25 @ 08:29) (95% - 100%)  Wt(kg): --110.9    01-27 @ 07:01  -  01-28 @ 07:00  --------------------------------------------------------  IN:    Oral Fluid: 710 mL  Total IN: 710 mL    OUT:    Voided (mL): 600 mL  Total OUT: 600 mL    Total NET: 110 mL    PHYSICAL EXAM:  Neuro: Awake, responsive  CV: S1 S2 RRR  Lungs: diminished to bases   GI: Softly distended, BS +,  NT, abd wall edema   Extremities: + edema, + anasarca     RADIOLOGY: < from: Xray Chest 1 View-PORTABLE IMMEDIATE (Xray Chest 1 View-PORTABLE IMMEDIATE .) (01.25.25 @ 10:52) >  IMPRESSION: There is persistent cardiomegaly. Poststernotomy, CABG and   TAVR. Mild pulmonary venous congestive changes with persistent hazy   opacity over the left lower lung zone. Left-sided pleural effusion, left   lower lung zone pneumonia and/or atelectasis cannot be excluded.    < end of copied text >    DIAGNOSTIC TESTING:    [x ] Echocardiogram: < from: TTE Echo Complete w/ Contrast w/ Doppler (08.08.24 @ 13:27) >   1. Left ventricular cavity is normal in size. Left ventricular systolic   function is severely decreased with an ejection fraction visually   estimated at 25 to 30 %. Global left ventricular hypokinesis.   2. There is severe (grade 3) left ventricular diastolic dysfunction,   with elevated left ventricular filling pressure.   3. The right ventricle is not well visualized.   4. Device lead is visualized in the right heart.   5. No significant valvular disease.   6. No pericardial effusion seen.   7. Estimated pulmonary artery systolic pressure is 53 mmHg, consistent   with moderate pulmonary hypertension.   8. Left and moderate left pleural effusion noted.   9. Mild pulmonic regurgitation.  10. Moderate tricuspid regurgitation.    < end of copied text >    < from: NM Pharm Stress Test, Dual Isotope (03.17.21 @ 12:40) >    1. There was scintigraphic evidence for a myocardial infarct in the RCA territory with no significant ischemia.    2. There is severely abnormal left ventricular contractility, globally diminished calculated ejection fraction and no wall motion abnormlaities. Overall post stress ejection fraction was 21%    LABS:	 	    28 Jan 2025 06:06    135    |  93     |  66     ----------------------------<  261    3.7     |  34     |  3.09   27 Jan 2025 08:15    134    |  94     |  70     ----------------------------<  203    4.1     |  34     |  3.31   26 Jan 2025 06:15    137    |  94     |  60     ----------------------------<  194    3.5     |  38     |  2.93     Ca    9.1        28 Jan 2025 06:06  Mg     2.4       27 Jan 2025 08:15                        9.1    9.62  )-----------( 195      ( 28 Jan 2025 06:06 )             32.2 ,                       8.7    9.04  )-----------( 185      ( 27 Jan 2025 08:15 )             30.7 ,                       9.2    9.75  )-----------( 177      ( 26 Jan 2025 06:15 )             33.0   pro-BNP: Pro-Brain Natriuretic Peptide: 4647 pg/mL (01.22.25 @ 06:31)

## 2025-01-28 NOTE — PROGRESS NOTE ADULT - SUBJECTIVE AND OBJECTIVE BOX
[facility] NEPHROLOGY SERVICES, [facility]  NEPHROLOGY AND HYPERTENSION  [address]  [address]  [place] [address]  [phone]    MD DANIEL GARRETT MD YELENA ROSENBERG, MD BINNY KOSHY, MD CHRISTOPHER CAPUTO, MD EDWARD BOVER, MD          Patient events noted    MEDICATIONS  (STANDING):  atorvastatin 20 milliGRAM(s) Oral at bedtime  dextrose 5%. 1000 milliLiter(s) (50 mL/Hr) IV Continuous <Continuous>  dextrose 5%. 1000 milliLiter(s) (50 mL/Hr) IV Continuous <Continuous>  dextrose 5%. 1000 milliLiter(s) (100 mL/Hr) IV Continuous <Continuous>  dextrose 50% Injectable 25 Gram(s) IV Push once  dextrose 50% Injectable 12.5 Gram(s) IV Push once  dextrose 50% Injectable 25 Gram(s) IV Push once  fluticasone propionate/ salmeterol 250-50 MICROgram(s) Diskus 1 Dose(s) Inhalation two times a day  furosemide Infusion 30 mG/Hr (15 mL/Hr) IV Continuous <Continuous>  glucagon  Injectable 1 milliGRAM(s) IntraMuscular once  influenza  Vaccine (HIGH DOSE) 0.5 milliLiter(s) IntraMuscular once  insulin glargine Injectable (LANTUS) 28 Unit(s) SubCutaneous at bedtime  insulin lispro (ADMELOG) corrective regimen sliding scale   SubCutaneous three times a day before meals  metolazone 10 milliGRAM(s) Oral daily  metoprolol succinate ER 25 milliGRAM(s) Oral daily  pantoprazole    Tablet 40 milliGRAM(s) Oral two times a day  rivaroxaban 15 milliGRAM(s) Oral with dinner  tamsulosin 0.4 milliGRAM(s) Oral at bedtime  tiotropium 2.5 MICROgram(s) Inhaler 2 Puff(s) Inhalation daily    MEDICATIONS  (PRN):  acetaminophen     Tablet .. 650 milliGRAM(s) Oral every 6 hours PRN Temp greater or equal to 38C (100.4F), Mild Pain (1 - 3)  albuterol/ipratropium for Nebulization.. 3 milliLiter(s) Inhalation every 6 hours PRN -for shortness of breath and/or wheezing  dextrose Oral Gel 15 Gram(s) Oral once PRN Blood Glucose LESS THAN 70 milliGRAM(s)/deciliter      01-28-25 @ 07:01  -  01-29-25 @ 07:00  --------------------------------------------------------  IN: 480 mL / OUT: 1400 mL / NET: -920 mL    01-29-25 @ 07:01 - 01-29-25 @ 19:15  --------------------------------------------------------  IN: 360 mL / OUT: 0 mL / NET: 360 mL      PHYSICAL EXAM:        Wt(kg): --  Lungs clear  Heart S1S2  Abd soft NT ND  Extremities:   2-3 edema                      Creatinine Trend: 3.03<--, 3.09<--, 3.31<--, 2.93<--, 2.81<--, 2.79<--        Assessment   Fluid overload   DENISHA CKD 3-4       Plan:    Resume Lasix drip 30 mg/hr   Janeth Rowe MD

## 2025-01-28 NOTE — PROGRESS NOTE ADULT - ASSESSMENT
79M with CAD/CABG, HFrEF 25%, ICD, afib, copd on home o2, htn hld dm p/w adhf, acute anemia requiring PRBC transfusion     remains volume overloaded    s/p bumex drip-> lasix 80 iv-> d/c'd 2/2 contraction alkalosis, renal recs appreciated, s/p acetazolamide dosing, bicarb now improved to 34, now restarted on diuretics-> Lasix 80 IV q 8h, metolazone 10/d.  discussed with renal, if hypervolemia persists despite diuretic use, will discuss with pt/family regarding further options as per renal    monitor renal function, electrolytes, bicarb  aim to keep k ~4.5 with contraction alkalosis by supplementing with KCl  cont on Toprol 25/d  add further GDMT for cardiomyopathy as BP and renal function allows  +Lt UE DVT - on AC, cont on Xarelto, home med

## 2025-01-28 NOTE — PROGRESS NOTE ADULT - SUBJECTIVE AND OBJECTIVE BOX
date of service: 01-28-25 @ 11:56  [facility]  REVIEW OF SYSTEMS:  CONSTITUTIONAL: No fever,  no  weight loss  ENT:  No  tinnitus,   no   vertigo  NECK: No pain or stiffness  RESPIRATORY: No cough, wheezing, chills or hemoptysis;    No Shortness of Breath  CARDIOVASCULAR: No chest pain, palpitations, dizziness  GASTROINTESTINAL: No abdominal or epigastric pain. No nausea, vomiting, or hematemesis; No diarrhea  No melena or hematochezia.  GENITOURINARY: No dysuria, frequency, hematuria, or incontinence  NEUROLOGICAL: No headaches  SKIN: No itching,  no   rash  LYMPH Nodes: No enlarged glands  ENDOCRINE: No heat or cold intolerance  MUSCULOSKELETAL: No joint pain or swelling  PSYCHIATRIC: No depression, anxiety  HEME/LYMPH: No easy bruising, or bleeding gums  ALLERGY AND IMMUNOLOGIC: No hives or eczema	    MEDICATIONS  (STANDING):  atorvastatin 20 milliGRAM(s) Oral at bedtime  dextrose 5%. 1000 milliLiter(s) (50 mL/Hr) IV Continuous <Continuous>  dextrose 5%. 1000 milliLiter(s) (100 mL/Hr) IV Continuous <Continuous>  dextrose 50% Injectable 25 Gram(s) IV Push once  dextrose 50% Injectable 12.5 Gram(s) IV Push once  dextrose 50% Injectable 25 Gram(s) IV Push once  fluticasone propionate/ salmeterol 250-50 MICROgram(s) Diskus 1 Dose(s) Inhalation two times a day  furosemide   Injectable 80 milliGRAM(s) IV Push every 8 hours  glucagon  Injectable 1 milliGRAM(s) IntraMuscular once  influenza  Vaccine (HIGH DOSE) 0.5 milliLiter(s) IntraMuscular once  insulin glargine Injectable (LANTUS) 22 Unit(s) SubCutaneous at bedtime  insulin lispro (ADMELOG) corrective regimen sliding scale   SubCutaneous three times a day before meals  metolazone 10 milliGRAM(s) Oral daily  metoprolol succinate ER 25 milliGRAM(s) Oral daily  pantoprazole    Tablet 40 milliGRAM(s) Oral two times a day  rivaroxaban 15 milliGRAM(s) Oral with dinner  tamsulosin 0.4 milliGRAM(s) Oral at bedtime  tiotropium 2.5 MICROgram(s) Inhaler 2 Puff(s) Inhalation daily    MEDICATIONS  (PRN):  acetaminophen     Tablet .. 650 milliGRAM(s) Oral every 6 hours PRN Temp greater or equal to 38C (100.4F), Mild Pain (1 - 3)  albuterol/ipratropium for Nebulization.. 3 milliLiter(s) Inhalation every 6 hours PRN -for shortness of breath and/or wheezing  dextrose Oral Gel 15 Gram(s) Oral once PRN Blood Glucose LESS THAN 70 milliGRAM(s)/deciliter      Vital Signs Last 24 Hrs  T(C): 36.9 (28 Jan 2025 11:02), Max: 36.9 (28 Jan 2025 11:02)  T(F): 98.4 (28 Jan 2025 11:02), Max: 98.4 (28 Jan 2025 11:02)  HR: 71 (28 Jan 2025 11:38) (69 - 108)  BP: 117/74 (28 Jan 2025 11:38) (112/73 - 139/76)  BP(mean): --  RR: 18 (28 Jan 2025 11:38) (18 - 18)  SpO2: 93% (28 Jan 2025 11:38) (93% - 100%)    Parameters below as of 28 Jan 2025 11:38  Patient On (Oxygen Delivery Method): nasal cannula  O2 Flow (L/min): 3    CAPILLARY BLOOD GLUCOSE      POCT Blood Glucose.: 303 mg/dL (28 Jan 2025 11:36)  POCT Blood Glucose.: 287 mg/dL (28 Jan 2025 08:31)  POCT Blood Glucose.: 256 mg/dL (27 Jan 2025 21:25)    I&O's Summary    27 Jan 2025 07:01  -  28 Jan 2025 07:00  --------------------------------------------------------  IN: 710 mL / OUT: 600 mL / NET: 110 mL          Appearance: Normal	  HEENT:   Normal oral mucosa, PERRL, EOMI	  Lymphatic: No lymphadenopathy  Cardiovascular: Normal S1 S2, No JVD  Respiratory: Lungs clear to auscultation	  Gastrointestinal:  Soft, Non-tender, + BS	  Skin: No rash, No ecchymoses	  Extremities:     edema  +  LABS:                        9.1    9.62  )-----------( 195      ( 28 Jan 2025 06:06 )             32.2     01-28    135  |  93[L]  |  66[H]  ----------------------------<  261[H]  3.7   |  34[H]  |  3.09[H]    Ca    9.1      28 Jan 2025 06:06  Mg     2.4     01-27            Urinalysis Basic - ( 28 Jan 2025 06:06 )    Color: x / Appearance: x / SG: x / pH: x  Gluc: 261 mg/dL / Ketone: x  / Bili: x / Urobili: x   Blood: x / Protein: x / Nitrite: x   Leuk Esterase: x / RBC: x / WBC x   Sq Epi: x / Non Sq Epi: x / Bacteria: x              Thyroid Stimulating Hormone, Serum: 2.850 uIU/mL (01-22 @ 06:31)          Consultant(s) Notes Reviewed:      Care Discussed with Consultants/Other Providers:

## 2025-01-29 LAB
A1C WITH ESTIMATED AVERAGE GLUCOSE RESULT: 7.2 % — HIGH (ref 4–5.6)
ANION GAP SERPL CALC-SCNC: 6 MMOL/L — SIGNIFICANT CHANGE UP (ref 5–17)
BUN SERPL-MCNC: 73 MG/DL — HIGH (ref 7–23)
CALCIUM SERPL-MCNC: 9.1 MG/DL — SIGNIFICANT CHANGE UP (ref 8.5–10.1)
CHLORIDE SERPL-SCNC: 92 MMOL/L — LOW (ref 96–108)
CO2 SERPL-SCNC: 37 MMOL/L — HIGH (ref 22–31)
CREAT SERPL-MCNC: 3.03 MG/DL — HIGH (ref 0.5–1.3)
EGFR: 20 ML/MIN/1.73M2 — LOW
ESTIMATED AVERAGE GLUCOSE: 160 MG/DL — HIGH (ref 68–114)
GLUCOSE BLDC GLUCOMTR-MCNC: 217 MG/DL — HIGH (ref 70–99)
GLUCOSE BLDC GLUCOMTR-MCNC: 236 MG/DL — HIGH (ref 70–99)
GLUCOSE BLDC GLUCOMTR-MCNC: 239 MG/DL — HIGH (ref 70–99)
GLUCOSE BLDC GLUCOMTR-MCNC: 280 MG/DL — HIGH (ref 70–99)
GLUCOSE BLDC GLUCOMTR-MCNC: 418 MG/DL — HIGH (ref 70–99)
GLUCOSE SERPL-MCNC: 226 MG/DL — HIGH (ref 70–99)
HCT VFR BLD CALC: 31.5 % — LOW (ref 39–50)
HGB BLD-MCNC: 9 G/DL — LOW (ref 13–17)
MCHC RBC-ENTMCNC: 26.7 PG — LOW (ref 27–34)
MCHC RBC-ENTMCNC: 28.6 G/DL — LOW (ref 32–36)
MCV RBC AUTO: 93.5 FL — SIGNIFICANT CHANGE UP (ref 80–100)
NRBC # BLD: 0 /100 WBCS — SIGNIFICANT CHANGE UP (ref 0–0)
NRBC BLD-RTO: 0 /100 WBCS — SIGNIFICANT CHANGE UP (ref 0–0)
PLATELET # BLD AUTO: 191 K/UL — SIGNIFICANT CHANGE UP (ref 150–400)
POTASSIUM SERPL-MCNC: 3 MMOL/L — LOW (ref 3.5–5.3)
POTASSIUM SERPL-SCNC: 3 MMOL/L — LOW (ref 3.5–5.3)
RBC # BLD: 3.37 M/UL — LOW (ref 4.2–5.8)
RBC # FLD: 27.3 % — HIGH (ref 10.3–14.5)
SODIUM SERPL-SCNC: 135 MMOL/L — SIGNIFICANT CHANGE UP (ref 135–145)
WBC # BLD: 9.05 K/UL — SIGNIFICANT CHANGE UP (ref 3.8–10.5)
WBC # FLD AUTO: 9.05 K/UL — SIGNIFICANT CHANGE UP (ref 3.8–10.5)

## 2025-01-29 PROCEDURE — 99232 SBSQ HOSP IP/OBS MODERATE 35: CPT

## 2025-01-29 PROCEDURE — 99233 SBSQ HOSP IP/OBS HIGH 50: CPT

## 2025-01-29 RX ORDER — POTASSIUM CHLORIDE 750 MG/1
40 TABLET, EXTENDED RELEASE ORAL ONCE
Refills: 0 | Status: COMPLETED | OUTPATIENT
Start: 2025-01-29 | End: 2025-01-29

## 2025-01-29 RX ADMIN — TIOTROPIUM BROMIDE MONOHYDRATE 2 PUFF(S): 18 CAPSULE ORAL; RESPIRATORY (INHALATION) at 11:56

## 2025-01-29 RX ADMIN — IPRATROPIUM BROMIDE AND ALBUTEROL SULFATE 3 MILLILITER(S): .5; 2.5 SOLUTION RESPIRATORY (INHALATION) at 08:54

## 2025-01-29 RX ADMIN — ATORVASTATIN CALCIUM 20 MILLIGRAM(S): 80 TABLET, FILM COATED ORAL at 21:28

## 2025-01-29 RX ADMIN — Medication 2: at 08:09

## 2025-01-29 RX ADMIN — Medication 25 MILLIGRAM(S): at 05:31

## 2025-01-29 RX ADMIN — ACETAMINOPHEN 650 MILLIGRAM(S): 160 SUSPENSION ORAL at 16:04

## 2025-01-29 RX ADMIN — FLUTICASONE PROPIONATE AND SALMETEROL 1 DOSE(S): 113; 14 POWDER, METERED RESPIRATORY (INHALATION) at 17:02

## 2025-01-29 RX ADMIN — POTASSIUM CHLORIDE 40 MILLIEQUIVALENT(S): 750 TABLET, EXTENDED RELEASE ORAL at 15:38

## 2025-01-29 RX ADMIN — PANTOPRAZOLE 40 MILLIGRAM(S): 20 TABLET, DELAYED RELEASE ORAL at 17:02

## 2025-01-29 RX ADMIN — ACETAMINOPHEN 650 MILLIGRAM(S): 160 SUSPENSION ORAL at 15:21

## 2025-01-29 RX ADMIN — Medication 2: at 16:53

## 2025-01-29 RX ADMIN — Medication 15 MG/HR: at 20:26

## 2025-01-29 RX ADMIN — PANTOPRAZOLE 40 MILLIGRAM(S): 20 TABLET, DELAYED RELEASE ORAL at 05:31

## 2025-01-29 RX ADMIN — INSULIN GLARGINE-YFGN 28 UNIT(S): 100 INJECTION, SOLUTION SUBCUTANEOUS at 21:28

## 2025-01-29 RX ADMIN — RIVAROXABAN 15 MILLIGRAM(S): 20 TABLET, FILM COATED ORAL at 16:52

## 2025-01-29 RX ADMIN — Medication 2: at 11:56

## 2025-01-29 RX ADMIN — FLUTICASONE PROPIONATE AND SALMETEROL 1 DOSE(S): 113; 14 POWDER, METERED RESPIRATORY (INHALATION) at 05:33

## 2025-01-29 RX ADMIN — TAMSULOSIN HYDROCHLORIDE 0.4 MILLIGRAM(S): 0.4 CAPSULE ORAL at 21:28

## 2025-01-29 NOTE — PROGRESS NOTE ADULT - SUBJECTIVE AND OBJECTIVE BOX
date of service: 01-29-25 @ 11:42  [facility]  REVIEW OF SYSTEMS:  CONSTITUTIONAL: No fever,  no  weight loss  ENT:  No  tinnitus,   no   vertigo  NECK: No pain or stiffness  RESPIRATORY: No cough, wheezing, chills or hemoptysis;    No Shortness of Breath  CARDIOVASCULAR: No chest pain, palpitations, dizziness  GASTROINTESTINAL: No abdominal or epigastric pain. No nausea, vomiting, or hematemesis; No diarrhea  No melena or hematochezia.  GENITOURINARY: No dysuria, frequency, hematuria, or incontinence  NEUROLOGICAL: No headaches  SKIN: No itching,  no   rash  LYMPH Nodes: No enlarged glands  ENDOCRINE: No heat or cold intolerance  MUSCULOSKELETAL: No joint pain or swelling  PSYCHIATRIC: No depression, anxiety  HEME/LYMPH: No easy bruising, or bleeding gums  ALLERGY AND IMMUNOLOGIC: No hives or eczema	    MEDICATIONS  (STANDING):  atorvastatin 20 milliGRAM(s) Oral at bedtime  dextrose 5%. 1000 milliLiter(s) (50 mL/Hr) IV Continuous <Continuous>  dextrose 5%. 1000 milliLiter(s) (100 mL/Hr) IV Continuous <Continuous>  dextrose 5%. 1000 milliLiter(s) (50 mL/Hr) IV Continuous <Continuous>  dextrose 50% Injectable 25 Gram(s) IV Push once  dextrose 50% Injectable 12.5 Gram(s) IV Push once  dextrose 50% Injectable 25 Gram(s) IV Push once  fluticasone propionate/ salmeterol 250-50 MICROgram(s) Diskus 1 Dose(s) Inhalation two times a day  furosemide Infusion 30 mG/Hr (15 mL/Hr) IV Continuous <Continuous>  glucagon  Injectable 1 milliGRAM(s) IntraMuscular once  influenza  Vaccine (HIGH DOSE) 0.5 milliLiter(s) IntraMuscular once  insulin glargine Injectable (LANTUS) 28 Unit(s) SubCutaneous at bedtime  insulin lispro (ADMELOG) corrective regimen sliding scale   SubCutaneous three times a day before meals  metolazone 10 milliGRAM(s) Oral daily  metoprolol succinate ER 25 milliGRAM(s) Oral daily  pantoprazole    Tablet 40 milliGRAM(s) Oral two times a day  rivaroxaban 15 milliGRAM(s) Oral with dinner  tamsulosin 0.4 milliGRAM(s) Oral at bedtime  tiotropium 2.5 MICROgram(s) Inhaler 2 Puff(s) Inhalation daily    MEDICATIONS  (PRN):  acetaminophen     Tablet .. 650 milliGRAM(s) Oral every 6 hours PRN Temp greater or equal to 38C (100.4F), Mild Pain (1 - 3)  albuterol/ipratropium for Nebulization.. 3 milliLiter(s) Inhalation every 6 hours PRN -for shortness of breath and/or wheezing  dextrose Oral Gel 15 Gram(s) Oral once PRN Blood Glucose LESS THAN 70 milliGRAM(s)/deciliter      Vital Signs Last 24 Hrs  T(C): 36.9 (29 Jan 2025 10:40), Max: 36.9 (28 Jan 2025 23:18)  T(F): 98.5 (29 Jan 2025 10:40), Max: 98.5 (29 Jan 2025 10:40)  HR: 72 (29 Jan 2025 10:40) (70 - 80)  BP: 138/52 (29 Jan 2025 10:40) (108/76 - 138/52)  BP(mean): --  RR: 18 (29 Jan 2025 10:40) (18 - 18)  SpO2: 95% (29 Jan 2025 10:40) (95% - 98%)    Parameters below as of 29 Jan 2025 10:40  Patient On (Oxygen Delivery Method): nasal cannula      CAPILLARY BLOOD GLUCOSE      POCT Blood Glucose.: 217 mg/dL (29 Jan 2025 07:52)  POCT Blood Glucose.: 315 mg/dL (28 Jan 2025 21:07)  POCT Blood Glucose.: 296 mg/dL (28 Jan 2025 16:50)  POCT Blood Glucose.: 336 mg/dL (28 Jan 2025 12:43)    I&O's Summary    28 Jan 2025 07:01  -  29 Jan 2025 07:00  --------------------------------------------------------  IN: 480 mL / OUT: 1400 mL / NET: -920 mL          Appearance: Normal	  HEENT:   Normal oral mucosa, PERRL, EOMI	  Lymphatic: No lymphadenopathy  Cardiovascular: Normal S1 S2, No JVD  Respiratory: Lungs clear to auscultation	  Gastrointestinal:  Soft, Non-tender, + BS	  Skin: No rash, No ecchymoses	  Extremities:     edema/ of  arms/  legs  LABS:                        9.0    9.05  )-----------( 191      ( 29 Jan 2025 07:55 )             31.5     01-28    135  |  93[L]  |  66[H]  ----------------------------<  261[H]  3.7   |  34[H]  |  3.09[H]    Ca    9.1      28 Jan 2025 06:06            Urinalysis Basic - ( 28 Jan 2025 06:06 )    Color: x / Appearance: x / SG: x / pH: x  Gluc: 261 mg/dL / Ketone: x  / Bili: x / Urobili: x   Blood: x / Protein: x / Nitrite: x   Leuk Esterase: x / RBC: x / WBC x   Sq Epi: x / Non Sq Epi: x / Bacteria: x              Thyroid Stimulating Hormone, Serum: 2.850 uIU/mL (01-22 @ 06:31)          Consultant(s) Notes Reviewed:      Care Discussed with Consultants/Other Providers:

## 2025-01-29 NOTE — PROGRESS NOTE ADULT - SUBJECTIVE AND OBJECTIVE BOX
[facility] NEPHROLOGY SERVICES, [facility]  NEPHROLOGY AND HYPERTENSION  [address]  [address]  [place] [address]  [phone]    MD DANIEL GARRETT MD YELENA ROSENBERG, MD BINNY KOSHY, MD CHRISTOPHER CAPUTO, MD EDWARD BOVER, MD          Patient events noted  No distress      MEDICATIONS  (STANDING):  atorvastatin 20 milliGRAM(s) Oral at bedtime  dextrose 5%. 1000 milliLiter(s) (50 mL/Hr) IV Continuous <Continuous>  dextrose 5%. 1000 milliLiter(s) (50 mL/Hr) IV Continuous <Continuous>  dextrose 5%. 1000 milliLiter(s) (100 mL/Hr) IV Continuous <Continuous>  dextrose 50% Injectable 25 Gram(s) IV Push once  dextrose 50% Injectable 12.5 Gram(s) IV Push once  dextrose 50% Injectable 25 Gram(s) IV Push once  fluticasone propionate/ salmeterol 250-50 MICROgram(s) Diskus 1 Dose(s) Inhalation two times a day  furosemide Infusion 30 mG/Hr (15 mL/Hr) IV Continuous <Continuous>  glucagon  Injectable 1 milliGRAM(s) IntraMuscular once  influenza  Vaccine (HIGH DOSE) 0.5 milliLiter(s) IntraMuscular once  insulin glargine Injectable (LANTUS) 28 Unit(s) SubCutaneous at bedtime  insulin lispro (ADMELOG) corrective regimen sliding scale   SubCutaneous three times a day before meals  metolazone 10 milliGRAM(s) Oral daily  metoprolol succinate ER 25 milliGRAM(s) Oral daily  pantoprazole    Tablet 40 milliGRAM(s) Oral two times a day  rivaroxaban 15 milliGRAM(s) Oral with dinner  tamsulosin 0.4 milliGRAM(s) Oral at bedtime  tiotropium 2.5 MICROgram(s) Inhaler 2 Puff(s) Inhalation daily    MEDICATIONS  (PRN):  acetaminophen     Tablet .. 650 milliGRAM(s) Oral every 6 hours PRN Temp greater or equal to 38C (100.4F), Mild Pain (1 - 3)  albuterol/ipratropium for Nebulization.. 3 milliLiter(s) Inhalation every 6 hours PRN -for shortness of breath and/or wheezing  dextrose Oral Gel 15 Gram(s) Oral once PRN Blood Glucose LESS THAN 70 milliGRAM(s)/deciliter      01-28-25 @ 07:01  -  01-29-25 @ 07:00  --------------------------------------------------------  IN: 480 mL / OUT: 1400 mL / NET: -920 mL    01-29-25 @ 07:01  -  01-29-25 @ 19:09  --------------------------------------------------------  IN: 360 mL / OUT: 0 mL / NET: 360 mL      PHYSICAL EXAM:      T(C): 36.9 (01-29-25 @ 16:10), Max: 36.9 (01-28-25 @ 23:18)  HR: 72 (01-29-25 @ 16:10) (70 - 80)  BP: 112/65 (01-29-25 @ 16:10) (108/76 - 138/52)  RR: 18 (01-29-25 @ 16:10) (18 - 18)  SpO2: 93% (01-29-25 @ 16:10) (93% - 98%)  Wt(kg): --  Lungs clear  Heart S1S2  Abd soft NT ND  Extremities:   2-3+ edema                                    9.0    9.05  )-----------( 191      ( 29 Jan 2025 07:55 )             31.5     01-29    135  |  92[L]  |  73[H]  ----------------------------<  226[H]  3.0[L]   |  37[H]  |  3.03[H]    Ca    9.1      29 Jan 2025 07:55              Creatinine Trend: 3.03<--, 3.09<--, 3.31<--, 2.93<--, 2.81<--, 2.79<--      Assessment   Fluid overload   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

## 2025-01-29 NOTE — PROGRESS NOTE ADULT - SUBJECTIVE AND OBJECTIVE BOX
Patient is a 79y old  Male who presents with a chief complaint of worsening sob and edema    PAST MEDICAL & SURGICAL HISTORY:  HTN (hypertension)    COPD (chronic obstructive pulmonary disease)    HLD (hyperlipidemia)    CAD    Insulin dependent type 2 diabetes mellitus    Chronic hypoxic respiratory failure, on home oxygen therapy    Stage 4 chronic kidney disease    Chronic combined systolic and diastolic heart failure    Unspecified atrial fibrillation    EMBER    S/P CABG x 3 in 2003    s/p TAVR    S/P hernia repair  hernia [id number]    S/P implantation of automatic cardioverter/defibrillator (AICD)    INTERVAL HISTORY: +mild dyspnea with mild wheezing, + anasarca   	  MEDICATIONS:  MEDICATIONS  (STANDING):  atorvastatin 20 milliGRAM(s) Oral at bedtime  fluticasone propionate/ salmeterol 250-50 MICROgram(s) Diskus 1 Dose(s) Inhalation two times a day  furosemide Infusion 30 mG/Hr (15 mL/Hr) IV Continuous <Continuous>  insulin glargine Injectable (LANTUS) 28 Unit(s) SubCutaneous at bedtime  insulin lispro (ADMELOG) corrective regimen sliding scale   SubCutaneous three times a day before meals  metolazone 10 milliGRAM(s) Oral daily  metoprolol succinate ER 25 milliGRAM(s) Oral daily  pantoprazole    Tablet 40 milliGRAM(s) Oral two times a day  rivaroxaban 15 milliGRAM(s) Oral with dinner  tamsulosin 0.4 milliGRAM(s) Oral at bedtime  tiotropium 2.5 MICROgram(s) Inhaler 2 Puff(s) Inhalation daily    MEDICATIONS  (PRN):  acetaminophen     Tablet .. 650 milliGRAM(s) Oral every 6 hours PRN Temp greater or equal to 38C (100.4F), Mild Pain (1 - 3)  albuterol/ipratropium for Nebulization.. 3 milliLiter(s) Inhalation every 6 hours PRN -for shortness of breath and/or wheezing  dextrose Oral Gel 15 Gram(s) Oral once PRN Blood Glucose LESS THAN 70 milliGRAM(s)/deciliter    Vitals:  T(F): 98.5 (01-29-25 @ 10:40), Max: 98.5 (01-29-25 @ 10:40)  HR: 75 (01-29-25 @ 13:30) (70 - 80)  BP: 138/52 (01-29-25 @ 10:40) (108/76 - 138/52)  RR: 18 (01-29-25 @ 10:40) (18 - 18)  SpO2: 98% (01-29-25 @ 13:30) (95% - 98%)    01-28 @ 07:01  -  01-29 @ 07:00  --------------------------------------------------------  IN:    Oral Fluid: 480 mL  Total IN: 480 mL    OUT:    Voided (mL): 1400 mL  Total OUT: 1400 mL    Total NET: -920 mL    PHYSICAL EXAM:  Neuro: Awake, responsive  CV: S1 S2 RRR +SM  Lungs: diminished to bases, mild wheezing    GI: Softly distended, BS +, abd wall edema, NT  Extremities: +LE edema, + anasarca    RADIOLOGY: < from: Xray Chest 1 View-PORTABLE IMMEDIATE (Xray Chest 1 View-PORTABLE IMMEDIATE .) (01.25.25 @ 10:52) >  IMPRESSION: There is persistent cardiomegaly. Poststernotomy, CABG and   TAVR. Mild pulmonary venous congestive changes with persistent hazy   opacity over the left lower lung zone. Left-sided pleural effusion, left   lower lung zone pneumonia and/or atelectasis cannot be excluded.    < end of copied text >    DIAGNOSTIC TESTING:    [x ] Echocardiogram: < from: TTE Echo Complete w/ Contrast w/ Doppler (08.08.24 @ 13:27) >   1. Left ventricular cavity is normal in size. Left ventricular systolic   function is severely decreased with an ejection fraction visually   estimated at 25 to 30 %. Global left ventricular hypokinesis.   2. There is severe (grade 3) left ventricular diastolic dysfunction,   with elevated left ventricular filling pressure.   3. The right ventricle is not well visualized.   4. Device lead is visualized in the right heart.   6. No pericardial effusion seen.   7. Estimated pulmonary artery systolic pressure is 53 mmHg, consistent   with moderate pulmonary hypertension.   8. Left and moderate left pleural effusion noted.   9. Mild pulmonic regurgitation.  10. Moderate tricuspid regurgitation.  A a transcatheter deployed (TAVR) is present in the aortic position.     < end of copied text >    < from: NM Pharm Stress Test, Dual Isotope (03.17.21 @ 12:40) >  1. There was scintigraphic evidence for a myocardial infarct in the RCA territory with no significant ischemia.    2. There is severely abnormal left ventricular contractility, globally diminished calculated ejection fraction and no wall motion abnormlaities. Overall post stress ejection fraction was 21%    < end of copied text >    LABS:	 	    28 Jan 2025 06:06    135    |  93     |  66     ----------------------------<  261    3.7     |  34     |  3.09   27 Jan 2025 08:15    134    |  94     |  70     ----------------------------<  203    4.1     |  34     |  3.31     Ca    9.1        28 Jan 2025 06:06                        9.0    9.05  )-----------( 191      ( 29 Jan 2025 07:55 )             31.5 ,                       9.1    9.62  )-----------( 195      ( 28 Jan 2025 06:06 )             32.2 ,                       8.7    9.04  )-----------( 185      ( 27 Jan 2025 08:15 )             30.7   pro-BNP: Pro-Brain Natriuretic Peptide: 7384 pg/mL (01.28.25 @ 06:06)

## 2025-01-29 NOTE — PROGRESS NOTE ADULT - ASSESSMENT
79M        h/o   CAD /CABG,  HFrEF 25%, ICD,  c/c  Afib , COPD , on home o2,  HTN/ HLD. DM        c/p  sob          from   anemia requiring PRBC transfusion  per chart and  from acute on c/c  systolic chf          remains volume overloaded,   had metabolic alkalosis with CO2 retention      EF,  6/2024,   25,  diastolic  dysfunction,, mod  TR, mod  pHtn     Cardiomyopathy  AICD          card  following        s/p bumex drip-> lasix 80 iv-> off now 2/2 contraction alkalosis, renal recs appreciated, acetazolamide dosing       +Lt  brachial/  cephalic  V  phlebitis,  perchart , was on   iv heparin,   then  switched to Xarelto, home med    DENISHA on CKD. stage  3. renal d r shaye .  HCO3   high, but lessening     c/c  Afib, on xarelto    c/c  anemia,  hb in 8 range. seen by  gi     DM, on lantus    hypergycemia    obesity, BMI  35    lipitor, bumex,  torpol, lantus, xarelto.   still has   edema.  pe r card,  clark s lessened     crt  is  3  now.  per  card  on iv lasix  tid  and  xaroxyln    follow  bmp    discussed  with wife        pt is dnr/dni   total  encounter   time  35 minutes       rad< from: TTE Echo Complete w/ Contrast w/ Doppler (08.08.24 @ 13:27) >  CONCLUSIONS:   1. Left ventricular cavity is normal in size. Left ventricular systolic   function is severely decreased with an ejection fraction visually   estimated at 25 to 30 %. Global left ventricular hypokinesis.   2. There is severe (grade 3) left ventricular diastolic dysfunction,   with elevated left ventricular filling pressure.   3. The right ventricle is not well visualized.   4. Device lead is visualized in the right heart.   5. No significant valvular disease.   6. No pericardial effusion seen.   7. Estimated pulmonary artery systolic pressure is 53 mmHg, consistent   with moderate pulmonary hypertension.   8. Left and moderate left pleural effusion noted.   9. Mild pulmonic regurgitation.  10. Moderate tricuspid regurgitation______________________________________________________________________  ____________  FINDINGS:  < end of copied text >           rad

## 2025-01-29 NOTE — PROGRESS NOTE ADULT - ASSESSMENT
79M with CAD/CABG, TAVR, HFrEF 25%, ICD, afib, copd on home o2, htn hld dm p/w adhf, acute anemia requiring PRBC transfusion     remains volume overloaded    s/p bumex drip-> lasix 80 iv-> d/c'd 2/2 contraction alkalosis, renal recs appreciated, s/p acetazolamide dosing, bicarb now improved to 34, now restarted on diuretics-> Lasix 80 IV q 8h ->now on Lasix drip at 30mg/hr, metolazone 10/d   if hypervolemia persists despite diuretic use, will discuss with pt/family regarding further options as per renal    monitor renal function, electrolytes, bicarb  aim to keep k ~4.5 with contraction alkalosis by supplementing with KCl  cont on Toprol 25/d  add further GDMT for cardiomyopathy as BP and renal function allows  +Lt UE DVT - on AC, cont on Xarelto, home med  cont with COPD management   increase activity as tolerated

## 2025-01-30 LAB
ANION GAP SERPL CALC-SCNC: 6 MMOL/L — SIGNIFICANT CHANGE UP (ref 5–17)
ANION GAP SERPL CALC-SCNC: 8 MMOL/L — SIGNIFICANT CHANGE UP (ref 5–17)
BUN SERPL-MCNC: 74 MG/DL — HIGH (ref 7–23)
BUN SERPL-MCNC: 75 MG/DL — HIGH (ref 7–23)
CALCIUM SERPL-MCNC: 8.8 MG/DL — SIGNIFICANT CHANGE UP (ref 8.5–10.1)
CALCIUM SERPL-MCNC: 9.1 MG/DL — SIGNIFICANT CHANGE UP (ref 8.5–10.1)
CHLORIDE SERPL-SCNC: 91 MMOL/L — LOW (ref 96–108)
CHLORIDE SERPL-SCNC: 91 MMOL/L — LOW (ref 96–108)
CO2 SERPL-SCNC: 36 MMOL/L — HIGH (ref 22–31)
CO2 SERPL-SCNC: 37 MMOL/L — HIGH (ref 22–31)
CREAT SERPL-MCNC: 3.06 MG/DL — HIGH (ref 0.5–1.3)
CREAT SERPL-MCNC: 3.27 MG/DL — HIGH (ref 0.5–1.3)
EGFR: 18 ML/MIN/1.73M2 — LOW
EGFR: 20 ML/MIN/1.73M2 — LOW
GLUCOSE BLDC GLUCOMTR-MCNC: 216 MG/DL — HIGH (ref 70–99)
GLUCOSE BLDC GLUCOMTR-MCNC: 269 MG/DL — HIGH (ref 70–99)
GLUCOSE BLDC GLUCOMTR-MCNC: 304 MG/DL — HIGH (ref 70–99)
GLUCOSE BLDC GLUCOMTR-MCNC: 332 MG/DL — HIGH (ref 70–99)
GLUCOSE SERPL-MCNC: 201 MG/DL — HIGH (ref 70–99)
GLUCOSE SERPL-MCNC: 280 MG/DL — HIGH (ref 70–99)
HCT VFR BLD CALC: 31.4 % — LOW (ref 39–50)
HGB BLD-MCNC: 9.1 G/DL — LOW (ref 13–17)
MCHC RBC-ENTMCNC: 26.7 PG — LOW (ref 27–34)
MCHC RBC-ENTMCNC: 29 G/DL — LOW (ref 32–36)
MCV RBC AUTO: 92.1 FL — SIGNIFICANT CHANGE UP (ref 80–100)
NRBC # BLD: 0 /100 WBCS — SIGNIFICANT CHANGE UP (ref 0–0)
NRBC BLD-RTO: 0 /100 WBCS — SIGNIFICANT CHANGE UP (ref 0–0)
PLATELET # BLD AUTO: 208 K/UL — SIGNIFICANT CHANGE UP (ref 150–400)
POTASSIUM SERPL-MCNC: 2.9 MMOL/L — CRITICAL LOW (ref 3.5–5.3)
POTASSIUM SERPL-MCNC: 3.9 MMOL/L — SIGNIFICANT CHANGE UP (ref 3.5–5.3)
POTASSIUM SERPL-SCNC: 2.9 MMOL/L — CRITICAL LOW (ref 3.5–5.3)
POTASSIUM SERPL-SCNC: 3.9 MMOL/L — SIGNIFICANT CHANGE UP (ref 3.5–5.3)
RBC # BLD: 3.41 M/UL — LOW (ref 4.2–5.8)
RBC # FLD: 27.4 % — HIGH (ref 10.3–14.5)
SODIUM SERPL-SCNC: 134 MMOL/L — LOW (ref 135–145)
SODIUM SERPL-SCNC: 135 MMOL/L — SIGNIFICANT CHANGE UP (ref 135–145)
WBC # BLD: 8.94 K/UL — SIGNIFICANT CHANGE UP (ref 3.8–10.5)
WBC # FLD AUTO: 8.94 K/UL — SIGNIFICANT CHANGE UP (ref 3.8–10.5)

## 2025-01-30 PROCEDURE — 99233 SBSQ HOSP IP/OBS HIGH 50: CPT

## 2025-01-30 RX ORDER — DM/PSEUDOEPHED/ACETAMINOPHEN 10-30-250
15 CAPSULE ORAL ONCE
Refills: 0 | Status: DISCONTINUED | OUTPATIENT
Start: 2025-01-30 | End: 2025-02-05

## 2025-01-30 RX ORDER — POTASSIUM CHLORIDE 750 MG/1
20 TABLET, EXTENDED RELEASE ORAL ONCE
Refills: 0 | Status: COMPLETED | OUTPATIENT
Start: 2025-01-30 | End: 2025-01-30

## 2025-01-30 RX ORDER — DM/PSEUDOEPHED/ACETAMINOPHEN 10-30-250
12.5 CAPSULE ORAL ONCE
Refills: 0 | Status: DISCONTINUED | OUTPATIENT
Start: 2025-01-30 | End: 2025-02-05

## 2025-01-30 RX ORDER — SODIUM CHLORIDE 9 G/ML
1000 INJECTION, SOLUTION INTRAVENOUS
Refills: 0 | Status: DISCONTINUED | OUTPATIENT
Start: 2025-01-30 | End: 2025-02-05

## 2025-01-30 RX ORDER — POTASSIUM CHLORIDE 750 MG/1
10 TABLET, EXTENDED RELEASE ORAL
Refills: 0 | Status: DISCONTINUED | OUTPATIENT
Start: 2025-01-30 | End: 2025-01-30

## 2025-01-30 RX ORDER — GLUCAGON 3 MG/1
1 POWDER NASAL ONCE
Refills: 0 | Status: DISCONTINUED | OUTPATIENT
Start: 2025-01-30 | End: 2025-02-05

## 2025-01-30 RX ORDER — INSULIN GLARGINE-YFGN 100 [IU]/ML
32 INJECTION, SOLUTION SUBCUTANEOUS AT BEDTIME
Refills: 0 | Status: DISCONTINUED | OUTPATIENT
Start: 2025-01-30 | End: 2025-02-05

## 2025-01-30 RX ORDER — INSULIN LISPRO 100/ML
3 VIAL (ML) SUBCUTANEOUS
Refills: 0 | Status: DISCONTINUED | OUTPATIENT
Start: 2025-01-30 | End: 2025-02-05

## 2025-01-30 RX ORDER — INSULIN LISPRO 100/ML
VIAL (ML) SUBCUTANEOUS
Refills: 0 | Status: DISCONTINUED | OUTPATIENT
Start: 2025-01-30 | End: 2025-02-05

## 2025-01-30 RX ORDER — DM/PSEUDOEPHED/ACETAMINOPHEN 10-30-250
25 CAPSULE ORAL ONCE
Refills: 0 | Status: DISCONTINUED | OUTPATIENT
Start: 2025-01-30 | End: 2025-02-05

## 2025-01-30 RX ORDER — POTASSIUM CHLORIDE 750 MG/1
40 TABLET, EXTENDED RELEASE ORAL ONCE
Refills: 0 | Status: COMPLETED | OUTPATIENT
Start: 2025-01-30 | End: 2025-01-30

## 2025-01-30 RX ADMIN — PANTOPRAZOLE 40 MILLIGRAM(S): 20 TABLET, DELAYED RELEASE ORAL at 05:17

## 2025-01-30 RX ADMIN — PANTOPRAZOLE 40 MILLIGRAM(S): 20 TABLET, DELAYED RELEASE ORAL at 17:04

## 2025-01-30 RX ADMIN — ATORVASTATIN CALCIUM 20 MILLIGRAM(S): 80 TABLET, FILM COATED ORAL at 21:24

## 2025-01-30 RX ADMIN — RIVAROXABAN 15 MILLIGRAM(S): 20 TABLET, FILM COATED ORAL at 17:00

## 2025-01-30 RX ADMIN — TIOTROPIUM BROMIDE MONOHYDRATE 2 PUFF(S): 18 CAPSULE ORAL; RESPIRATORY (INHALATION) at 11:07

## 2025-01-30 RX ADMIN — Medication 3: at 12:09

## 2025-01-30 RX ADMIN — Medication 15 MG/HR: at 17:03

## 2025-01-30 RX ADMIN — POTASSIUM CHLORIDE 100 MILLIEQUIVALENT(S): 750 TABLET, EXTENDED RELEASE ORAL at 11:05

## 2025-01-30 RX ADMIN — Medication 4: at 16:57

## 2025-01-30 RX ADMIN — INSULIN GLARGINE-YFGN 32 UNIT(S): 100 INJECTION, SOLUTION SUBCUTANEOUS at 21:29

## 2025-01-30 RX ADMIN — FLUTICASONE PROPIONATE AND SALMETEROL 1 DOSE(S): 113; 14 POWDER, METERED RESPIRATORY (INHALATION) at 05:51

## 2025-01-30 RX ADMIN — Medication 25 MILLIGRAM(S): at 05:18

## 2025-01-30 RX ADMIN — TAMSULOSIN HYDROCHLORIDE 0.4 MILLIGRAM(S): 0.4 CAPSULE ORAL at 21:23

## 2025-01-30 RX ADMIN — POTASSIUM CHLORIDE 100 MILLIEQUIVALENT(S): 750 TABLET, EXTENDED RELEASE ORAL at 12:13

## 2025-01-30 RX ADMIN — POTASSIUM CHLORIDE 40 MILLIEQUIVALENT(S): 750 TABLET, EXTENDED RELEASE ORAL at 10:59

## 2025-01-30 RX ADMIN — POTASSIUM CHLORIDE 20 MILLIEQUIVALENT(S): 750 TABLET, EXTENDED RELEASE ORAL at 12:50

## 2025-01-30 RX ADMIN — FLUTICASONE PROPIONATE AND SALMETEROL 1 DOSE(S): 113; 14 POWDER, METERED RESPIRATORY (INHALATION) at 17:04

## 2025-01-30 RX ADMIN — Medication 2: at 08:08

## 2025-01-30 NOTE — PROGRESS NOTE ADULT - ASSESSMENT
79M with CAD/CABG, TAVR, HFrEF 25%, ICD, afib, copd on home o2, htn hld dm p/w adhf, acute anemia requiring PRBC transfusion     remains volume overloaded    s/p bumex drip-> lasix 80 iv-> d/c'd 2/2 contraction alkalosis, renal recs appreciated, s/p acetazolamide dosing, bicarb now improved to 34, now restarted on diuretics-> Lasix 80 IV q 8h ->now on Lasix drip at 30mg/hr, metolazone 10/d   if hypervolemia persists despite diuretic use, will discuss with pt/family regarding further options again as per renal, may need HD    monitor renal function, electrolytes, bicarb  aim to keep k ~4.5 with contraction alkalosis by supplementing with KCl  cont on Toprol 25/d  add further GDMT for cardiomyopathy as BP and renal function allows  +Lt UE DVT - on AC, cont on Xarelto, home med  cont with COPD management   increase activity as tolerated

## 2025-01-30 NOTE — PROGRESS NOTE ADULT - SUBJECTIVE AND OBJECTIVE BOX
date of service: 01-30-25 @ 12:27  [facility]  REVIEW OF SYSTEMS:  CONSTITUTIONAL: No fever,  no  weight loss  ENT:  No  tinnitus,   no   vertigo  NECK: No pain or stiffness  RESPIRATORY: No cough, wheezing, chills or hemoptysis;    No Shortness of Breath  CARDIOVASCULAR: No chest pain, palpitations, dizziness  GASTROINTESTINAL: No abdominal or epigastric pain. No nausea, vomiting, or hematemesis; No diarrhea  No melena or hematochezia.  GENITOURINARY: No dysuria, frequency, hematuria, or incontinence  NEUROLOGICAL: No headaches  SKIN: No itching,  no   rash  LYMPH Nodes: No enlarged glands  ENDOCRINE: No heat or cold intolerance  MUSCULOSKELETAL: No joint pain or swelling  PSYCHIATRIC: No depression, anxiety  HEME/LYMPH: No easy bruising, or bleeding gums  ALLERGY AND IMMUNOLOGIC: No hives or eczema	    MEDICATIONS  (STANDING):  atorvastatin 20 milliGRAM(s) Oral at bedtime  dextrose 5%. 1000 milliLiter(s) (50 mL/Hr) IV Continuous <Continuous>  dextrose 5%. 1000 milliLiter(s) (100 mL/Hr) IV Continuous <Continuous>  dextrose 5%. 1000 milliLiter(s) (50 mL/Hr) IV Continuous <Continuous>  dextrose 50% Injectable 25 Gram(s) IV Push once  dextrose 50% Injectable 12.5 Gram(s) IV Push once  dextrose 50% Injectable 25 Gram(s) IV Push once  fluticasone propionate/ salmeterol 250-50 MICROgram(s) Diskus 1 Dose(s) Inhalation two times a day  furosemide Infusion 30 mG/Hr (15 mL/Hr) IV Continuous <Continuous>  glucagon  Injectable 1 milliGRAM(s) IntraMuscular once  influenza  Vaccine (HIGH DOSE) 0.5 milliLiter(s) IntraMuscular once  insulin glargine Injectable (LANTUS) 28 Unit(s) SubCutaneous at bedtime  insulin lispro (ADMELOG) corrective regimen sliding scale   SubCutaneous three times a day before meals  metolazone 10 milliGRAM(s) Oral daily  metoprolol succinate ER 25 milliGRAM(s) Oral daily  pantoprazole    Tablet 40 milliGRAM(s) Oral two times a day  potassium chloride  10 mEq/100 mL IVPB 10 milliEquivalent(s) IV Intermittent every 1 hour  rivaroxaban 15 milliGRAM(s) Oral with dinner  tamsulosin 0.4 milliGRAM(s) Oral at bedtime  tiotropium 2.5 MICROgram(s) Inhaler 2 Puff(s) Inhalation daily    MEDICATIONS  (PRN):  acetaminophen     Tablet .. 650 milliGRAM(s) Oral every 6 hours PRN Temp greater or equal to 38C (100.4F), Mild Pain (1 - 3)  albuterol/ipratropium for Nebulization.. 3 milliLiter(s) Inhalation every 6 hours PRN -for shortness of breath and/or wheezing  dextrose Oral Gel 15 Gram(s) Oral once PRN Blood Glucose LESS THAN 70 milliGRAM(s)/deciliter      Vital Signs Last 24 Hrs  T(C): 37.6 (30 Jan 2025 04:47), Max: 37.6 (30 Jan 2025 04:47)  T(F): 99.6 (30 Jan 2025 04:47), Max: 99.6 (30 Jan 2025 04:47)  HR: 70 (30 Jan 2025 11:10) (67 - 77)  BP: 123/78 (30 Jan 2025 11:10) (112/65 - 142/75)  BP(mean): --  RR: 18 (30 Jan 2025 11:10) (18 - 18)  SpO2: 94% (30 Jan 2025 11:10) (93% - 99%)    Parameters below as of 30 Jan 2025 11:10  Patient On (Oxygen Delivery Method): nasal cannula      CAPILLARY BLOOD GLUCOSE      POCT Blood Glucose.: 269 mg/dL (30 Jan 2025 11:50)  POCT Blood Glucose.: 216 mg/dL (30 Jan 2025 07:41)  POCT Blood Glucose.: 280 mg/dL (29 Jan 2025 20:59)  POCT Blood Glucose.: 239 mg/dL (29 Jan 2025 16:34)    I&O's Summary    29 Jan 2025 07:01  -  30 Jan 2025 07:00  --------------------------------------------------------  IN: 480 mL / OUT: 1000 mL / NET: -520 mL          Appearance: Normal	  HEENT:   Normal oral mucosa, PERRL, EOMI	  Lymphatic: No lymphadenopathy  Cardiovascular: Normal S1 S2, No JVD  Respiratory: Lungs clear to auscultation	  Gastrointestinal:  Soft, Non-tender, + BS	  Skin: No rash, No ecchymoses	  Extremities:   edema    LABS:                        9.1    8.94  )-----------( 208      ( 30 Jan 2025 08:14 )             31.4     01-30    135  |  91[L]  |  74[H]  ----------------------------<  201[H]  2.9[LL]   |  36[H]  |  3.06[H]    Ca    9.1      30 Jan 2025 08:14            Urinalysis Basic - ( 30 Jan 2025 08:14 )    Color: x / Appearance: x / SG: x / pH: x  Gluc: 201 mg/dL / Ketone: x  / Bili: x / Urobili: x   Blood: x / Protein: x / Nitrite: x   Leuk Esterase: x / RBC: x / WBC x   Sq Epi: x / Non Sq Epi: x / Bacteria: x              Thyroid Stimulating Hormone, Serum: 2.850 uIU/mL (01-22 @ 06:31)          Consultant(s) Notes Reviewed:      Care Discussed with Consultants/Other Providers:

## 2025-01-30 NOTE — CHART NOTE - NSCHARTNOTEFT_GEN_A_CORE
Assessment:  Pt appeared alert, oriented, c SOB, disorientation noted.  Pt's wife was @ bedside.  Pt adm c diagnosis of symptomatic anemia, CHF exacerbation, acute on chronic renal failure, fluid overload, diuretic resistance, contraction alkalosis, hypokalemia, hyperglycemia noted & D/C plans to [place] delayed at present.  Pt is DNR/DNI, no feeding tube as per MOLST in chart.  PMH include Afib, CHF, CKD stage 4, chronic hypoxic respiratory failure, D(TOUJEO, Humalog insulin, stopped taking Ozempic due to nausea as reported by wife per RD note 01/14),  HLD, COPD, HTN.        Factors impacting intake: [ ] none [ ] nausea  [ ] vomiting [ ] diarrhea [ ] constipation  [ x]chewing problems; pt is edentulous, able to eat soft foods as per pt  [ ] swallowing issues  [x ] other: acute illness    Diet Prescription: Diet, Consistent Carbohydrate w/Evening Snack:   DASH/TLC {Sodium & Cholesterol Restricted}  Supplement Feeding Modality:  Oral  Glucerna Shake Cans or Servings Per Day:  1       Frequency:  Daily (01-22-25 @ 08:51)    Nutrition education on current nutrition therapy provided to pt & wife.    Intake: 50-75% of meals & supplement     Current Weight: 01/30, 110.9 kg, 01/11, 107 kg, c wt. gain of 3.9 kg/8.6 LBS  % Weight Change: 3.6%  01/29, 2+(mild) edema of legs, left flank, 3+(moderate) edema of feet noted.  I/O: 480/1000(-520)  Pt is not appropriate at present for nutrition focus physical due to acute illness, SOB, edema      Pertinent Medications: MEDICATIONS  (STANDING):  atorvastatin 20 milliGRAM(s) Oral at bedtime  dextrose 5%. 1000 milliLiter(s) (50 mL/Hr) IV Continuous <Continuous>  dextrose 5%. 1000 milliLiter(s) (100 mL/Hr) IV Continuous <Continuous>  dextrose 5%. 1000 milliLiter(s) (50 mL/Hr) IV Continuous <Continuous>  dextrose 50% Injectable 25 Gram(s) IV Push once  dextrose 50% Injectable 12.5 Gram(s) IV Push once  dextrose 50% Injectable 25 Gram(s) IV Push once  fluticasone propionate/ salmeterol 250-50 MICROgram(s) Diskus 1 Dose(s) Inhalation two times a day  furosemide Infusion 30 mG/Hr (15 mL/Hr) IV Continuous <Continuous>  glucagon  Injectable 1 milliGRAM(s) IntraMuscular once  influenza  Vaccine (HIGH DOSE) 0.5 milliLiter(s) IntraMuscular once  insulin glargine Injectable (LANTUS) 28 Unit(s) SubCutaneous at bedtime  insulin lispro (ADMELOG) corrective regimen sliding scale   SubCutaneous three times a day before meals  metolazone 10 milliGRAM(s) Oral daily  metoprolol succinate ER 25 milliGRAM(s) Oral daily  pantoprazole    Tablet 40 milliGRAM(s) Oral two times a day  potassium chloride   Powder 40 milliEquivalent(s) Oral once  potassium chloride  10 mEq/100 mL IVPB 10 milliEquivalent(s) IV Intermittent every 1 hour  rivaroxaban 15 milliGRAM(s) Oral with dinner  tamsulosin 0.4 milliGRAM(s) Oral at bedtime  tiotropium 2.5 MICROgram(s) Inhaler 2 Puff(s) Inhalation daily    MEDICATIONS  (PRN):  acetaminophen     Tablet .. 650 milliGRAM(s) Oral every 6 hours PRN Temp greater or equal to 38C (100.4F), Mild Pain (1 - 3)  albuterol/ipratropium for Nebulization.. 3 milliLiter(s) Inhalation every 6 hours PRN -for shortness of breath and/or wheezing  dextrose Oral Gel 15 Gram(s) Oral once PRN Blood Glucose LESS THAN 70 milliGRAM(s)/deciliter    Pertinent Labs: 01-30 Na135 mmol/L Glu 201 mg/dL[H] K+ 2.9 mmol/L[LL] Cr  3.06 mg/dL[H] BUN 74 mg/dL[H] 01-26 Alb 2.9 g/dL[L]01-26 ALT 23 U/L AST 30 U/L Alkaline Phosphatase 208 U/L[H]  01-29-25 @ 07:55 a1c 7.2<H>   CAPILLARY BLOOD GLUCOSE      POCT Blood Glucose.: 216 mg/dL (30 Jan 2025 07:41)  POCT Blood Glucose.: 280 mg/dL (29 Jan 2025 20:59)  POCT Blood Glucose.: 239 mg/dL (29 Jan 2025 16:34)  POCT Blood Glucose.: 236 mg/dL (29 Jan 2025 11:52)  POCT Blood Glucose.: 418 mg/dL (29 Jan 2025 11:51)    Skin:   pressure ulcer x 1  1. 01/14, right buttock; stage II  wounds shin and toes noted    Estimated Needs:   [x ] no change since previous assessment(01/14)  [ ] recalculated:     Previous Nutrition Diagnosis: (01/14)  Other: Decreased Nutrient Needs (Na, fluid, CHO)  Etiology: Uncontrolled DM, CHF exacerbation  Signs/Symptoms: A1C 9% (8/8/24) & wife reports 9.4% (11/25/24); admitted c SOB, 4+ edema b/l legs & left arm  Goal/Expected Outcome: Pt to verbalize need for lifestyle changes; 3 CHO foods & 3 high Na foods to limit in daily diet+ fluid limitations ; partially met by pt & wife  Nutrition Diagnosis is [x ] ongoing  [ ] resolved [ ] not applicable       New Nutrition Diagnosis:(01/30)  [x ] Increased Nutrient Needs; protein-energy  Related to: increased demand for nutrient  As evidenced by: pressure ulcer  Goal: pt to consume >50-75% of meals & supplement     Interventions:   Recommend  [x ] Change Diet To: low sodium, consistent carbohydrate c evening snack, 1500 ml fluid restriction  [ x] Nutrition Supplement; Glucerna Shake 1 x daily (c dinner meal)= 200 calories, 10 grams protein per serving   [ ] Nutrition Support  [x ] Other: Endocrine consult     Monitoring and Evaluation:   [x ] PO intake [ x ] Tolerance to diet prescription [ x ] weights [ x ] labs[ x ] follow up per protocol  [ ] other: Assessment:  Pt appeared alert, oriented, c SOB, disorientation noted.  Pt's wife was @ bedside.  Pt adm c diagnosis of symptomatic anemia, CHF exacerbation, acute on chronic renal failure, fluid overload, diuretic resistance, contraction alkalosis, hypokalemia, hyperglycemia noted & D/C plans to [place] delayed at present.  Pt is DNR/DNI, no feeding tube as per [facility] in chart.  PMH include Afib, CHF, CKD stage 4, chronic hypoxic respiratory failure, D(TOUJEO, Humalog insulin, stopped taking Ozempic due to nausea as reported by wife per RD note 01/14),  HLD, COPD, HTN.        Factors impacting intake: [ ] none [ ] nausea  [ ] vomiting [ ] diarrhea [ ] constipation  [ x]chewing problems; pt is edentulous, able to eat soft foods as per pt  [ ] swallowing issues  [x ] other: acute illness    Diet Prescription: Diet, Consistent Carbohydrate w/Evening Snack:   DASH/TLC {Sodium & Cholesterol Restricted}  Supplement Feeding Modality:  Oral  Glucerna Shake Cans or Servings Per Day:  1       Frequency:  Daily (01-22-25 @ 08:51)    Nutrition education on current nutrition therapy provided to pt & wife.    Intake: 50-75% of meals & supplement     Current Weight: 01/30, 110.9 kg, 01/11, 107 kg, c wt. gain of 3.9 kg/8.6 LBS  % Weight Change: 3.6%  01/29, 2+(mild) edema of legs, left flank, 3+(moderate) edema of feet noted.  I/O: 480/1000(-520)  Pt is not appropriate at present for nutrition focus physical due to acute illness, SOB, edema      Pertinent Medications: MEDICATIONS  (STANDING):  atorvastatin 20 milliGRAM(s) Oral at bedtime  dextrose 5%. 1000 milliLiter(s) (50 mL/Hr) IV Continuous <Continuous>  dextrose 5%. 1000 milliLiter(s) (100 mL/Hr) IV Continuous <Continuous>  dextrose 5%. 1000 milliLiter(s) (50 mL/Hr) IV Continuous <Continuous>  dextrose 50% Injectable 25 Gram(s) IV Push once  dextrose 50% Injectable 12.5 Gram(s) IV Push once  dextrose 50% Injectable 25 Gram(s) IV Push once  fluticasone propionate/ salmeterol 250-50 MICROgram(s) Diskus 1 Dose(s) Inhalation two times a day  furosemide Infusion 30 mG/Hr (15 mL/Hr) IV Continuous <Continuous>  glucagon  Injectable 1 milliGRAM(s) IntraMuscular once  influenza  Vaccine (HIGH DOSE) 0.5 milliLiter(s) IntraMuscular once  insulin glargine Injectable (LANTUS) 28 Unit(s) SubCutaneous at bedtime  insulin lispro (ADMELOG) corrective regimen sliding scale   SubCutaneous three times a day before meals  metolazone 10 milliGRAM(s) Oral daily  metoprolol succinate ER 25 milliGRAM(s) Oral daily  pantoprazole    Tablet 40 milliGRAM(s) Oral two times a day  potassium chloride   Powder 40 milliEquivalent(s) Oral once  potassium chloride  10 mEq/100 mL IVPB 10 milliEquivalent(s) IV Intermittent every 1 hour  rivaroxaban 15 milliGRAM(s) Oral with dinner  tamsulosin 0.4 milliGRAM(s) Oral at bedtime  tiotropium 2.5 MICROgram(s) Inhaler 2 Puff(s) Inhalation daily    MEDICATIONS  (PRN):  acetaminophen     Tablet .. 650 milliGRAM(s) Oral every 6 hours PRN Temp greater or equal to 38C (100.4F), Mild Pain (1 - 3)  albuterol/ipratropium for Nebulization.. 3 milliLiter(s) Inhalation every 6 hours PRN -for shortness of breath and/or wheezing  dextrose Oral Gel 15 Gram(s) Oral once PRN Blood Glucose LESS THAN 70 milliGRAM(s)/deciliter    Pertinent Labs: 01-30 Na135 mmol/L Glu 201 mg/dL[H] K+ 2.9 mmol/L[LL] Cr  3.06 mg/dL[H] BUN 74 mg/dL[H] 01-26 Alb 2.9 g/dL[L]01-26 ALT 23 U/L AST 30 U/L Alkaline Phosphatase 208 U/L[H]  01-29-25 @ 07:55 a1c 7.2<H>   CAPILLARY BLOOD GLUCOSE      POCT Blood Glucose.: 216 mg/dL (30 Jan 2025 07:41)  POCT Blood Glucose.: 280 mg/dL (29 Jan 2025 20:59)  POCT Blood Glucose.: 239 mg/dL (29 Jan 2025 16:34)  POCT Blood Glucose.: 236 mg/dL (29 Jan 2025 11:52)  POCT Blood Glucose.: 418 mg/dL (29 Jan 2025 11:51)    Skin:   pressure ulcer x 1  1. 01/14, right buttock; stage II  wounds shin and toes noted    Estimated Needs:   [x ] no change since previous assessment(01/14)  [ ] recalculated:     Previous Nutrition Diagnosis: (01/14)  Other: Decreased Nutrient Needs (Na, fluid, CHO)  Etiology: Uncontrolled DM, CHF exacerbation  Signs/Symptoms: A1C 9% (8/8/24) & wife reports 9.4% (11/25/24); admitted c SOB, 4+ edema b/l legs & left arm  Goal/Expected Outcome: Pt to verbalize need for lifestyle changes; 3 CHO foods & 3 high Na foods to limit in daily diet+ fluid limitations ; partially met by pt & wife  Nutrition Diagnosis is [x ] ongoing  [ ] resolved [ ] not applicable       New Nutrition Diagnosis:(01/30)  [x ] Increased Nutrient Needs; protein-energy  Related to: increased demand for nutrient  As evidenced by: pressure ulcer  Goal: pt to consume >50-75% of meals & supplement     Interventions:   Recommend  [x ] Change Diet To: easy to chew, low sodium, consistent carbohydrate c evening snack, 1500 ml fluid restriction  [ x] Nutrition Supplement; Glucerna Shake 1 x daily (c dinner meal)= 200 calories, 10 grams protein per serving   [ ] Nutrition Support  [x ] Other: Endocrine consult     Monitoring and Evaluation:   [x ] PO intake [ x ] Tolerance to diet prescription [ x ] weights [ x ] labs[ x ] follow up per protocol  [ ] other: Assessment:  Pt appeared alert, oriented, c SOB, disorientation noted.  Pt's wife was @ bedside.  Pt adm c diagnosis of symptomatic anemia, CHF exacerbation, acute on chronic renal failure, fluid overload, diuretic resistance, contraction alkalosis, hypokalemia, hyperglycemia noted & D/C plans to [place] delayed at present.  Pt is DNR/DNI, no feeding tube as per [facility] in chart.  PMH include Afib, CHF, CKD stage 4, chronic hypoxic respiratory failure, DM(was on TOUJEO, Humalog insulin, stopped taking Ozempic due to nausea as reported by wife per RD note 01/14),  HLD, COPD, HTN.        Factors impacting intake: [ ] none [ ] nausea  [ ] vomiting [ ] diarrhea [ ] constipation  [ x]chewing problems; pt is edentulous, able to eat soft foods as per pt  [ ] swallowing issues  [x ] other: acute illness    Diet Prescription: Diet, Consistent Carbohydrate w/Evening Snack:   DASH/TLC {Sodium & Cholesterol Restricted}  Supplement Feeding Modality:  Oral  Glucerna Shake Cans or Servings Per Day:  1       Frequency:  Daily (01-22-25 @ 08:51)    Nutrition education on current nutrition therapy provided to pt & wife.    Intake: 50-75% of meals & supplement     Current Weight: 01/30, 110.9 kg, 01/11, 107 kg, c wt. gain of 3.9 kg/8.6 LBS  % Weight Change: 3.6%  01/29, 2+(mild) edema of legs, left flank, 3+(moderate) edema of feet noted.  I/O: 480/1000(-520)  Pt is not appropriate at present for nutrition focus physical due to acute illness, SOB, edema      Pertinent Medications: MEDICATIONS  (STANDING):  atorvastatin 20 milliGRAM(s) Oral at bedtime  dextrose 5%. 1000 milliLiter(s) (50 mL/Hr) IV Continuous <Continuous>  dextrose 5%. 1000 milliLiter(s) (100 mL/Hr) IV Continuous <Continuous>  dextrose 5%. 1000 milliLiter(s) (50 mL/Hr) IV Continuous <Continuous>  dextrose 50% Injectable 25 Gram(s) IV Push once  dextrose 50% Injectable 12.5 Gram(s) IV Push once  dextrose 50% Injectable 25 Gram(s) IV Push once  fluticasone propionate/ salmeterol 250-50 MICROgram(s) Diskus 1 Dose(s) Inhalation two times a day  furosemide Infusion 30 mG/Hr (15 mL/Hr) IV Continuous <Continuous>  glucagon  Injectable 1 milliGRAM(s) IntraMuscular once  influenza  Vaccine (HIGH DOSE) 0.5 milliLiter(s) IntraMuscular once  insulin glargine Injectable (LANTUS) 28 Unit(s) SubCutaneous at bedtime  insulin lispro (ADMELOG) corrective regimen sliding scale   SubCutaneous three times a day before meals  metolazone 10 milliGRAM(s) Oral daily  metoprolol succinate ER 25 milliGRAM(s) Oral daily  pantoprazole    Tablet 40 milliGRAM(s) Oral two times a day  potassium chloride   Powder 40 milliEquivalent(s) Oral once  potassium chloride  10 mEq/100 mL IVPB 10 milliEquivalent(s) IV Intermittent every 1 hour  rivaroxaban 15 milliGRAM(s) Oral with dinner  tamsulosin 0.4 milliGRAM(s) Oral at bedtime  tiotropium 2.5 MICROgram(s) Inhaler 2 Puff(s) Inhalation daily    MEDICATIONS  (PRN):  acetaminophen     Tablet .. 650 milliGRAM(s) Oral every 6 hours PRN Temp greater or equal to 38C (100.4F), Mild Pain (1 - 3)  albuterol/ipratropium for Nebulization.. 3 milliLiter(s) Inhalation every 6 hours PRN -for shortness of breath and/or wheezing  dextrose Oral Gel 15 Gram(s) Oral once PRN Blood Glucose LESS THAN 70 milliGRAM(s)/deciliter    Pertinent Labs: 01-30 Na135 mmol/L Glu 201 mg/dL[H] K+ 2.9 mmol/L[LL] Cr  3.06 mg/dL[H] BUN 74 mg/dL[H] 01-26 Alb 2.9 g/dL[L]01-26 ALT 23 U/L AST 30 U/L Alkaline Phosphatase 208 U/L[H]  01-29-25 @ 07:55 a1c 7.2<H>   CAPILLARY BLOOD GLUCOSE      POCT Blood Glucose.: 216 mg/dL (30 Jan 2025 07:41)  POCT Blood Glucose.: 280 mg/dL (29 Jan 2025 20:59)  POCT Blood Glucose.: 239 mg/dL (29 Jan 2025 16:34)  POCT Blood Glucose.: 236 mg/dL (29 Jan 2025 11:52)  POCT Blood Glucose.: 418 mg/dL (29 Jan 2025 11:51)    Skin:   pressure ulcer x 1  1. 01/14, right buttock; stage II  wounds shin and toes noted    Estimated Needs:   [x ] no change since previous assessment(01/14)  [ ] recalculated:     Previous Nutrition Diagnosis: (01/14)  Other: Decreased Nutrient Needs (Na, fluid, CHO)  Etiology: Uncontrolled DM, CHF exacerbation  Signs/Symptoms: A1C 9% (8/8/24) & wife reports 9.4% (11/25/24); admitted c SOB, 4+ edema b/l legs & left arm  Goal/Expected Outcome: Pt to verbalize need for lifestyle changes; 3 CHO foods & 3 high Na foods to limit in daily diet+ fluid limitations ; partially met by pt & wife  Nutrition Diagnosis is [x ] ongoing  [ ] resolved [ ] not applicable       New Nutrition Diagnosis:(01/30)  [x ] Increased Nutrient Needs; protein-energy  Related to: increased demand for nutrient  As evidenced by: pressure ulcer  Goal: pt to consume >50-75% of meals & supplement     Interventions:   Recommend  [x ] Change Diet To: easy to chew, low sodium, consistent carbohydrate c evening snack, 1500 ml fluid restriction  [ x] Nutrition Supplement; Glucerna Shake 1 x daily (c dinner meal)= 200 calories, 10 grams protein per serving   [ ] Nutrition Support  [x ] Other: Endocrine consult     Monitoring and Evaluation:   [x ] PO intake [ x ] Tolerance to diet prescription [ x ] weights [ x ] labs[ x ] follow up per protocol  [ ] other:

## 2025-01-30 NOTE — PROGRESS NOTE ADULT - SUBJECTIVE AND OBJECTIVE BOX
Patient is a 79y old  Male who presents with a chief complaint of worsening sob and edema     PAST MEDICAL & SURGICAL HISTORY:  HTN (hypertension)    COPD (chronic obstructive pulmonary disease)    HLD (hyperlipidemia)    Insulin dependent type 2 diabetes mellitus    Chronic hypoxic respiratory failure, on home oxygen therapy    Stage 4 chronic kidney disease    Chronic combined systolic and diastolic heart failure  Unspecified atrial fibrillation    S/P CABG x 3  in 2003    s/p TAVR    S/P hernia repair  hernia [id number]    S/P implantation of automatic cardioverter/defibrillator (AICD)    INTERVAL HISTORY:  	  MEDICATIONS:  MEDICATIONS  (STANDING):  atorvastatin 20 milliGRAM(s) Oral at bedtime  fluticasone propionate/ salmeterol 250-50 MICROgram(s) Diskus 1 Dose(s) Inhalation two times a day  furosemide Infusion 30 mG/Hr (15 mL/Hr) IV Continuous <Continuous>  insulin glargine Injectable (LANTUS) 28 Unit(s) SubCutaneous at bedtime  insulin lispro (ADMELOG) corrective regimen sliding scale   SubCutaneous three times a day before meals  metolazone 10 milliGRAM(s) Oral daily  metoprolol succinate ER 25 milliGRAM(s) Oral daily  pantoprazole    Tablet 40 milliGRAM(s) Oral two times a day  rivaroxaban 15 milliGRAM(s) Oral with dinner  tamsulosin 0.4 milliGRAM(s) Oral at bedtime  tiotropium 2.5 MICROgram(s) Inhaler 2 Puff(s) Inhalation daily    MEDICATIONS  (PRN):  acetaminophen     Tablet .. 650 milliGRAM(s) Oral every 6 hours PRN Temp greater or equal to 38C (100.4F), Mild Pain (1 - 3)  albuterol/ipratropium for Nebulization.. 3 milliLiter(s) Inhalation every 6 hours PRN -for shortness of breath and/or wheezing  dextrose Oral Gel 15 Gram(s) Oral once PRN Blood Glucose LESS THAN 70 milliGRAM(s)/deciliter    Vitals:  T(F): 99.6 (01-30-25 @ 04:47), Max: 99.6 (01-30-25 @ 04:47)  HR: 70 (01-30-25 @ 08:40) (67 - 77)  BP: 142/75 (01-30-25 @ 04:47) (112/65 - 142/75)  RR: 18 (01-30-25 @ 04:47) (18 - 18)  SpO2: 94% (01-30-25 @ 08:40) (93% - 99%)    01-29 @ 07:01  -  01-30 @ 07:00  --------------------------------------------------------  IN:    Oral Fluid: 480 mL  Total IN: 480 mL    OUT:    Voided (mL): 1000 mL  Total OUT: 1000 mL    Total NET: -520 mL    PHYSICAL EXAM:  Neuro: Awake, responsive  CV: S1 S2 RRR +SM  Lungs:   GI: Softly distended, BS +,  NT, abd wall edema   Extremities: +LE edema, + anasarca     RADIOLOGY: < from: Xray Chest 1 View-PORTABLE IMMEDIATE (Xray Chest 1 View-PORTABLE IMMEDIATE .) (01.25.25 @ 10:52) >  IMPRESSION: There is persistent cardiomegaly. Poststernotomy, CABG and   TAVR. Mild pulmonary venous congestive changes with persistent hazy   opacity over the left lower lung zone. Left-sided pleural effusion, left   lower lung zone pneumonia and/or atelectasis cannot be excluded.    < end of copied text >    DIAGNOSTIC TESTING:    [x ] Echocardiogram: < from: TTE Echo Complete w/ Contrast w/ Doppler (08.08.24 @ 13:27) >   1. Left ventricular cavity is normal in size. Left ventricular systolic   function is severely decreased with an ejection fraction visually   estimated at 25 to 30 %. Global left ventricular hypokinesis.   2. There is severe (grade 3) left ventricular diastolic dysfunction,   with elevated left ventricular filling pressure.   3. The right ventricle is not well visualized.   4. Device lead is visualized in the right heart.   5. No significant valvular disease.   6. No pericardial effusion seen.   7. Estimated pulmonary artery systolic pressure is 53 mmHg, consistent   with moderate pulmonary hypertension.   8. Left and moderate left pleural effusion noted.   9. Mild pulmonic regurgitation.  10. Moderate tricuspid regurgitation.    < end of copied text >     from: NM Pharm Stress Test, Dual Isotope (03.17.21 @ 12:40) >    1. There was scintigraphic evidence for a myocardial infarct in the RCA territory with no significant ischemia.    2. There is severely abnormal left ventricular contractility, globally diminished calculated ejection fraction and no wall motion abnormlaities. Overall post stress ejection fraction was 21%    < end of copied text >    LABS:	 	    29 Jan 2025 07:55    135    |  92     |  73     ----------------------------<  226    3.0     |  37     |  3.03   28 Jan 2025 06:06    135    |  93     |  66     ----------------------------<  261    3.7     |  34     |  3.09     Ca    9.1        29 Jan 2025 07:55                        9.1    8.94  )-----------( 208      ( 30 Jan 2025 08:14 )             31.4 ,                       9.0    9.05  )-----------( 191      ( 29 Jan 2025 07:55 )             31.5 ,                       9.1    9.62  )-----------( 195      ( 28 Jan 2025 06:06 )             32.2   pro-BNP: Pro-Brain Natriuretic Peptide: 7384 pg/mL (01.28.25 @ 06:06)                   Patient is a 79y old  Male who presents with a chief complaint of worsening sob and edema     PAST MEDICAL & SURGICAL HISTORY:  HTN (hypertension)    COPD (chronic obstructive pulmonary disease)    HLD (hyperlipidemia)    Insulin dependent type 2 diabetes mellitus    Chronic hypoxic respiratory failure, on home oxygen therapy    Stage 4 chronic kidney disease    Chronic combined systolic and diastolic heart failure  Unspecified atrial fibrillation    S/P CABG x 3  in 2003    s/p TAVR    S/P hernia repair  hernia [id number]    S/P implantation of automatic cardioverter/defibrillator (AICD)    INTERVAL HISTORY: resting in bed in no acute distress, mild dyspnea, +anasarca    	  MEDICATIONS:  MEDICATIONS  (STANDING):  atorvastatin 20 milliGRAM(s) Oral at bedtime  fluticasone propionate/ salmeterol 250-50 MICROgram(s) Diskus 1 Dose(s) Inhalation two times a day  furosemide Infusion 30 mG/Hr (15 mL/Hr) IV Continuous <Continuous>  insulin glargine Injectable (LANTUS) 28 Unit(s) SubCutaneous at bedtime  insulin lispro (ADMELOG) corrective regimen sliding scale   SubCutaneous three times a day before meals  metolazone 10 milliGRAM(s) Oral daily  metoprolol succinate ER 25 milliGRAM(s) Oral daily  pantoprazole    Tablet 40 milliGRAM(s) Oral two times a day  rivaroxaban 15 milliGRAM(s) Oral with dinner  tamsulosin 0.4 milliGRAM(s) Oral at bedtime  tiotropium 2.5 MICROgram(s) Inhaler 2 Puff(s) Inhalation daily    MEDICATIONS  (PRN):  acetaminophen     Tablet .. 650 milliGRAM(s) Oral every 6 hours PRN Temp greater or equal to 38C (100.4F), Mild Pain (1 - 3)  albuterol/ipratropium for Nebulization.. 3 milliLiter(s) Inhalation every 6 hours PRN -for shortness of breath and/or wheezing  dextrose Oral Gel 15 Gram(s) Oral once PRN Blood Glucose LESS THAN 70 milliGRAM(s)/deciliter    Vitals:  T(F): 99.6 (01-30-25 @ 04:47), Max: 99.6 (01-30-25 @ 04:47)  HR: 70 (01-30-25 @ 08:40) (67 - 77)  BP: 142/75 (01-30-25 @ 04:47) (112/65 - 142/75)  RR: 18 (01-30-25 @ 04:47) (18 - 18)  SpO2: 94% (01-30-25 @ 08:40) (93% - 99%)    01-29 @ 07:01  -  01-30 @ 07:00  --------------------------------------------------------  IN:    Oral Fluid: 480 mL  Total IN: 480 mL    OUT:    Voided (mL): 1000 mL  Total OUT: 1000 mL    Total NET: -520 mL    PHYSICAL EXAM:  Neuro: Awake, responsive  CV: S1 S2 RRR +SM  Lungs: diminished to bases  GI: Softly distended, BS +,  NT, abd wall edema   Extremities: +LE edema, + anasarca     RADIOLOGY: < from: Xray Chest 1 View-PORTABLE IMMEDIATE (Xray Chest 1 View-PORTABLE IMMEDIATE .) (01.25.25 @ 10:52) >  IMPRESSION: There is persistent cardiomegaly. Poststernotomy, CABG and   TAVR. Mild pulmonary venous congestive changes with persistent hazy   opacity over the left lower lung zone. Left-sided pleural effusion, left   lower lung zone pneumonia and/or atelectasis cannot be excluded.    < end of copied text >    DIAGNOSTIC TESTING:    [x ] Echocardiogram: < from: TTE Echo Complete w/ Contrast w/ Doppler (08.08.24 @ 13:27) >   1. Left ventricular cavity is normal in size. Left ventricular systolic   function is severely decreased with an ejection fraction visually   estimated at 25 to 30 %. Global left ventricular hypokinesis.   2. There is severe (grade 3) left ventricular diastolic dysfunction,   with elevated left ventricular filling pressure.   3. The right ventricle is not well visualized.   4. Device lead is visualized in the right heart.   5. No significant valvular disease.   6. No pericardial effusion seen.   7. Estimated pulmonary artery systolic pressure is 53 mmHg, consistent   with moderate pulmonary hypertension.   8. Left and moderate left pleural effusion noted.   9. Mild pulmonic regurgitation.  10. Moderate tricuspid regurgitation.    < end of copied text >     from: NM Pharm Stress Test, Dual Isotope (03.17.21 @ 12:40) >    1. There was scintigraphic evidence for a myocardial infarct in the RCA territory with no significant ischemia.    2. There is severely abnormal left ventricular contractility, globally diminished calculated ejection fraction and no wall motion abnormlaities. Overall post stress ejection fraction was 21%    < end of copied text >    LABS:	 	    29 Jan 2025 07:55    135    |  92     |  73     ----------------------------<  226    3.0     |  37     |  3.03   28 Jan 2025 06:06    135    |  93     |  66     ----------------------------<  261    3.7     |  34     |  3.09     Ca    9.1        29 Jan 2025 07:55                        9.1    8.94  )-----------( 208      ( 30 Jan 2025 08:14 )             31.4 ,                       9.0    9.05  )-----------( 191      ( 29 Jan 2025 07:55 )             31.5 ,                       9.1    9.62  )-----------( 195      ( 28 Jan 2025 06:06 )             32.2   pro-BNP: Pro-Brain Natriuretic Peptide: 7384 pg/mL (01.28.25 @ 06:06)

## 2025-01-30 NOTE — PROGRESS NOTE ADULT - SUBJECTIVE AND OBJECTIVE BOX
NEPHROLOGY PROGRESS NOTE    CHIEF COMPLAINT:  DENISHA/CKD    HPI:  Denies any sob.  Urine output doesn't seem great.    EXAM:  Vital Signs Last 24 Hrs  T(C): 37.6 (2025 04:47), Max: 37.6 (2025 04:47)  T(F): 99.6 (2025 04:47), Max: 99.6 (2025 04:47)  HR: 70 (2025 08:40) (67 - 77)  BP: 142/75 (2025 04:47) (112/65 - 142/75)  BP(mean): --  RR: 18 (2025 04:47) (18 - 18)  SpO2: 94% (2025 08:40) (93% - 99%)    Parameters below as of 2025 04:47  Patient On (Oxygen Delivery Method): nasal cannula  O2 Flow (L/min): 3    I&O's Summary    2025 07:01  -  2025 07:00  --------------------------------------------------------  IN: 480 mL / OUT: 1000 mL / NET: -520 mL      Daily     Daily Weight in k.9 (2025 04:47)    Conversant, in no apparent distress  Normal respiratory effort, lungs clear bilaterally  Heart RRR with no murmur, large peripheral edema    LABS                        9.1    8.94  )-----------( 208      ( 2025 08:14 )             31.4         135  |  91[L]  |  74[H]  ----------------------------<  201[H]  2.9[LL]   |  36[H]  |  3.06[H]    Ca    9.1      2025 08:14        Assessment   Fluid overload   DENISHA CKD 3-4   Contraction alkalosis   Hypokalemia   Diuretic resistant    Plan:  Replete K  Lasix drip 30 mg/hr   Zaroxolyn   Will need to rethink HD

## 2025-01-30 NOTE — PROGRESS NOTE ADULT - ASSESSMENT
79M        h/o   CAD /CABG,  HFrEF 25%, ICD,  c/c  Afib , COPD , on home o2,  HTN/ HLD. DM        c/p  sob          from   anemia requiring PRBC transfusion  per chart and  from acute on c/c  systolic chf          remains volume overloaded,   had metabolic alkalosis with CO2 retention      EF,  6/2024,   25,  diastolic  dysfunction,, mod  TR, mod  pHtn     Cardiomyopathy  AICD          card  following        s/p bumex drip-> lasix 80 iv-> off now 2/2 contraction alkalosis, renal recs appreciated, acetazolamide dosing       +Lt  brachial/  cephalic  V  phlebitis,  perchart , was on   iv heparin,   then  switched to Xarelto, home med    DENISHA on CKD. stage  3. renal d r hsaye .  HCO3   high, but lessening     c/c  Afib, on xarelto    c/c  anemia,  hb in 8 range. seen by  gi     DM, on lantus    hypergycemia    obesity, BMI  35    lipitor, bumex,  torpol, lantus, xarelto.   still has   edema.  pe r card,  clark s lessened     crt  is  3     on iv  lasix  drip,  hypokalemia,  follwo  rpt bmp  later   wife at  bedside       discussed  with wife        pt is dnr/dni   total  encounter   time  35 minutes       rad< from: TTE Echo Complete w/ Contrast w/ Doppler (08.08.24 @ 13:27) >  CONCLUSIONS:   1. Left ventricular cavity is normal in size. Left ventricular systolic   function is severely decreased with an ejection fraction visually   estimated at 25 to 30 %. Global left ventricular hypokinesis.   2. There is severe (grade 3) left ventricular diastolic dysfunction,   with elevated left ventricular filling pressure.   3. The right ventricle is not well visualized.   4. Device lead is visualized in the right heart.   5. No significant valvular disease.   6. No pericardial effusion seen.   7. Estimated pulmonary artery systolic pressure is 53 mmHg, consistent   with moderate pulmonary hypertension.   8. Left and moderate left pleural effusion noted.   9. Mild pulmonic regurgitation.  10. Moderate tricuspid regurgitation______________________________________________________________________  ____________  FINDINGS:  < end of copied text >           rad   79M        h/o   CAD /CABG,  HFrEF 25%, ICD,  c/c  Afib , COPD , on home o2,  HTN/ HLD. DM        c/p  sob          from   anemia requiring PRBC transfusion  per chart and  from acute on c/c  systolic chf          remains volume overloaded,   had metabolic alkalosis with CO2 retention      EF,  6/2024,   25,  diastolic  dysfunction,, mod  TR, mod  pHtn     Cardiomyopathy  AICD          card  following        s/p bumex drip-> lasix 80 iv-> off now 2/2 contraction alkalosis, renal recs appreciated, acetazolamide dosing       +Lt  brachial/  cephalic  V  phlebitis,  perchart , was on   iv heparin,   then  switched to Xarelto, home med    DENISHA on CKD. stage  3. renal d r shaye .  HCO3   high, but lessening     c/c  Afib, on xarelto    c/c  anemia,  hb in 8 range. seen by  gi     DM, on lantus    hypergycemia    obesity, BMI  35    lipitor, bumex,  torpol, lantus, xarelto.   still has   edema.  pe r card,  clark s lessened     crt  is  3     on iv  lasix  drip,  hypokalemia,  follwo  rpt bmp  later   wife at  bedside    total encounter time   35  minutes        pt is dnr/dni          rad< from: TTE Echo Complete w/ Contrast w/ Doppler (08.08.24 @ 13:27) >  CONCLUSIONS:   1. Left ventricular cavity is normal in size. Left ventricular systolic   function is severely decreased with an ejection fraction visually   estimated at 25 to 30 %. Global left ventricular hypokinesis.   2. There is severe (grade 3) left ventricular diastolic dysfunction,   with elevated left ventricular filling pressure.   3. The right ventricle is not well visualized.   4. Device lead is visualized in the right heart.   5. No significant valvular disease.   6. No pericardial effusion seen.   7. Estimated pulmonary artery systolic pressure is 53 mmHg, consistent   with moderate pulmonary hypertension.   8. Left and moderate left pleural effusion noted.   9. Mild pulmonic regurgitation.  10. Moderate tricuspid regurgitation______________________________________________________________________  ____________  FINDINGS:  < end of copied text >           rad

## 2025-01-30 NOTE — PROVIDER CONTACT NOTE (CRITICAL VALUE NOTIFICATION) - BACKGROUND
patient presents with worsening SOB
CHF exacerbation, symptomatic anemia, generalized swelling of whole body, renal failure

## 2025-01-31 LAB
ANION GAP SERPL CALC-SCNC: 7 MMOL/L — SIGNIFICANT CHANGE UP (ref 5–17)
ANION GAP SERPL CALC-SCNC: 8 MMOL/L — SIGNIFICANT CHANGE UP (ref 5–17)
BUN SERPL-MCNC: 74 MG/DL — HIGH (ref 7–23)
BUN SERPL-MCNC: 77 MG/DL — HIGH (ref 7–23)
CALCIUM SERPL-MCNC: 8.7 MG/DL — SIGNIFICANT CHANGE UP (ref 8.5–10.1)
CALCIUM SERPL-MCNC: 8.9 MG/DL — SIGNIFICANT CHANGE UP (ref 8.5–10.1)
CHLORIDE SERPL-SCNC: 87 MMOL/L — LOW (ref 96–108)
CHLORIDE SERPL-SCNC: 89 MMOL/L — LOW (ref 96–108)
CO2 SERPL-SCNC: 37 MMOL/L — HIGH (ref 22–31)
CO2 SERPL-SCNC: 38 MMOL/L — HIGH (ref 22–31)
CREAT SERPL-MCNC: 2.88 MG/DL — HIGH (ref 0.5–1.3)
CREAT SERPL-MCNC: 2.96 MG/DL — HIGH (ref 0.5–1.3)
EGFR: 21 ML/MIN/1.73M2 — LOW
EGFR: 21 ML/MIN/1.73M2 — LOW
GLUCOSE BLDC GLUCOMTR-MCNC: 198 MG/DL — HIGH (ref 70–99)
GLUCOSE BLDC GLUCOMTR-MCNC: 206 MG/DL — HIGH (ref 70–99)
GLUCOSE BLDC GLUCOMTR-MCNC: 216 MG/DL — HIGH (ref 70–99)
GLUCOSE BLDC GLUCOMTR-MCNC: 228 MG/DL — HIGH (ref 70–99)
GLUCOSE BLDC GLUCOMTR-MCNC: 249 MG/DL — HIGH (ref 70–99)
GLUCOSE SERPL-MCNC: 229 MG/DL — HIGH (ref 70–99)
GLUCOSE SERPL-MCNC: 304 MG/DL — HIGH (ref 70–99)
MAGNESIUM SERPL-MCNC: 2.1 MG/DL — SIGNIFICANT CHANGE UP (ref 1.6–2.6)
PHOSPHATE SERPL-MCNC: 4.7 MG/DL — HIGH (ref 2.5–4.5)
POTASSIUM SERPL-MCNC: 2.6 MMOL/L — CRITICAL LOW (ref 3.5–5.3)
POTASSIUM SERPL-MCNC: 3.9 MMOL/L — SIGNIFICANT CHANGE UP (ref 3.5–5.3)
POTASSIUM SERPL-SCNC: 2.6 MMOL/L — CRITICAL LOW (ref 3.5–5.3)
POTASSIUM SERPL-SCNC: 3.9 MMOL/L — SIGNIFICANT CHANGE UP (ref 3.5–5.3)
SODIUM SERPL-SCNC: 132 MMOL/L — LOW (ref 135–145)
SODIUM SERPL-SCNC: 134 MMOL/L — LOW (ref 135–145)

## 2025-01-31 PROCEDURE — 99233 SBSQ HOSP IP/OBS HIGH 50: CPT

## 2025-01-31 RX ORDER — POTASSIUM CHLORIDE 750 MG/1
40 TABLET, EXTENDED RELEASE ORAL ONCE
Refills: 0 | Status: COMPLETED | OUTPATIENT
Start: 2025-01-31 | End: 2025-01-31

## 2025-01-31 RX ORDER — POTASSIUM CHLORIDE 750 MG/1
20 TABLET, EXTENDED RELEASE ORAL ONCE
Refills: 0 | Status: DISCONTINUED | OUTPATIENT
Start: 2025-01-31 | End: 2025-01-31

## 2025-01-31 RX ADMIN — INSULIN GLARGINE-YFGN 32 UNIT(S): 100 INJECTION, SOLUTION SUBCUTANEOUS at 21:45

## 2025-01-31 RX ADMIN — ATORVASTATIN CALCIUM 20 MILLIGRAM(S): 80 TABLET, FILM COATED ORAL at 21:44

## 2025-01-31 RX ADMIN — Medication 15 MG/HR: at 07:22

## 2025-01-31 RX ADMIN — POTASSIUM CHLORIDE 40 MILLIEQUIVALENT(S): 750 TABLET, EXTENDED RELEASE ORAL at 09:43

## 2025-01-31 RX ADMIN — PANTOPRAZOLE 40 MILLIGRAM(S): 20 TABLET, DELAYED RELEASE ORAL at 05:15

## 2025-01-31 RX ADMIN — POTASSIUM CHLORIDE 40 MILLIEQUIVALENT(S): 750 TABLET, EXTENDED RELEASE ORAL at 12:05

## 2025-01-31 RX ADMIN — RIVAROXABAN 15 MILLIGRAM(S): 20 TABLET, FILM COATED ORAL at 17:09

## 2025-01-31 RX ADMIN — FLUTICASONE PROPIONATE AND SALMETEROL 1 DOSE(S): 113; 14 POWDER, METERED RESPIRATORY (INHALATION) at 05:19

## 2025-01-31 RX ADMIN — TAMSULOSIN HYDROCHLORIDE 0.4 MILLIGRAM(S): 0.4 CAPSULE ORAL at 21:44

## 2025-01-31 RX ADMIN — Medication 2: at 08:17

## 2025-01-31 RX ADMIN — Medication 2: at 17:09

## 2025-01-31 RX ADMIN — Medication 3 UNIT(S): at 17:08

## 2025-01-31 RX ADMIN — IPRATROPIUM BROMIDE AND ALBUTEROL SULFATE 3 MILLILITER(S): .5; 2.5 SOLUTION RESPIRATORY (INHALATION) at 12:34

## 2025-01-31 RX ADMIN — FLUTICASONE PROPIONATE AND SALMETEROL 1 DOSE(S): 113; 14 POWDER, METERED RESPIRATORY (INHALATION) at 17:09

## 2025-01-31 RX ADMIN — Medication 1: at 12:05

## 2025-01-31 RX ADMIN — Medication 3 UNIT(S): at 08:17

## 2025-01-31 RX ADMIN — PANTOPRAZOLE 40 MILLIGRAM(S): 20 TABLET, DELAYED RELEASE ORAL at 17:09

## 2025-01-31 RX ADMIN — Medication 25 MILLIGRAM(S): at 05:15

## 2025-01-31 RX ADMIN — Medication 3 UNIT(S): at 12:05

## 2025-01-31 RX ADMIN — Medication 15 MG/HR: at 09:45

## 2025-01-31 RX ADMIN — TIOTROPIUM BROMIDE MONOHYDRATE 2 PUFF(S): 18 CAPSULE ORAL; RESPIRATORY (INHALATION) at 11:30

## 2025-01-31 NOTE — PROGRESS NOTE ADULT - SUBJECTIVE AND OBJECTIVE BOX
date of service: 01-31-25 @ 11:28  [facility]  REVIEW OF SYSTEMS:  CONSTITUTIONAL: No fever,  no  weight loss  ENT:  No  tinnitus,   no   vertigo  NECK: No pain or stiffness  RESPIRATORY: No cough, wheezing, chills or hemoptysis;    No Shortness of Breath  CARDIOVASCULAR: No chest pain, palpitations, dizziness  GASTROINTESTINAL: No abdominal or epigastric pain. No nausea, vomiting, or hematemesis; No diarrhea  No melena or hematochezia.  GENITOURINARY: No dysuria, frequency, hematuria, or incontinence  NEUROLOGICAL: No headaches  SKIN: No itching,  no   rash  LYMPH Nodes: No enlarged glands  ENDOCRINE: No heat or cold intolerance  MUSCULOSKELETAL: No joint pain or swelling  PSYCHIATRIC: No depression, anxiety  HEME/LYMPH: No easy bruising, or bleeding gums  ALLERGY AND IMMUNOLOGIC: No hives or eczema	    MEDICATIONS  (STANDING):  atorvastatin 20 milliGRAM(s) Oral at bedtime  dextrose 5%. 1000 milliLiter(s) (50 mL/Hr) IV Continuous <Continuous>  dextrose 5%. 1000 milliLiter(s) (100 mL/Hr) IV Continuous <Continuous>  dextrose 50% Injectable 25 Gram(s) IV Push once  dextrose 50% Injectable 12.5 Gram(s) IV Push once  dextrose 50% Injectable 25 Gram(s) IV Push once  fluticasone propionate/ salmeterol 250-50 MICROgram(s) Diskus 1 Dose(s) Inhalation two times a day  furosemide Infusion 30 mG/Hr (15 mL/Hr) IV Continuous <Continuous>  glucagon  Injectable 1 milliGRAM(s) IntraMuscular once  glucagon  Injectable 1 milliGRAM(s) IntraMuscular once  influenza  Vaccine (HIGH DOSE) 0.5 milliLiter(s) IntraMuscular once  insulin glargine Injectable (LANTUS) 32 Unit(s) SubCutaneous at bedtime  insulin lispro (ADMELOG) corrective regimen sliding scale   SubCutaneous three times a day before meals  insulin lispro Injectable (ADMELOG) 3 Unit(s) SubCutaneous three times a day before meals  metolazone 10 milliGRAM(s) Oral daily  metoprolol succinate ER 25 milliGRAM(s) Oral daily  pantoprazole    Tablet 40 milliGRAM(s) Oral two times a day  potassium chloride    Tablet ER 20 milliEquivalent(s) Oral once  rivaroxaban 15 milliGRAM(s) Oral with dinner  tamsulosin 0.4 milliGRAM(s) Oral at bedtime  tiotropium 2.5 MICROgram(s) Inhaler 2 Puff(s) Inhalation daily    MEDICATIONS  (PRN):  acetaminophen     Tablet .. 650 milliGRAM(s) Oral every 6 hours PRN Temp greater or equal to 38C (100.4F), Mild Pain (1 - 3)  albuterol/ipratropium for Nebulization.. 3 milliLiter(s) Inhalation every 6 hours PRN -for shortness of breath and/or wheezing  dextrose Oral Gel 15 Gram(s) Oral once PRN Blood Glucose LESS THAN 70 milliGRAM(s)/deciliter      Vital Signs Last 24 Hrs  T(C): 36.3 (31 Jan 2025 04:53), Max: 37.6 (30 Jan 2025 16:14)  T(F): 97.3 (31 Jan 2025 04:53), Max: 99.7 (30 Jan 2025 16:14)  HR: 76 (31 Jan 2025 07:00) (72 - 89)  BP: 105/68 (31 Jan 2025 04:53) (105/68 - 135/73)  BP(mean): --  RR: 18 (31 Jan 2025 04:53) (18 - 18)  SpO2: 99% (31 Jan 2025 07:00) (94% - 100%)    Parameters below as of 31 Jan 2025 07:00  Patient On (Oxygen Delivery Method): nasal cannula  O2 Flow (L/min): 3    CAPILLARY BLOOD GLUCOSE      POCT Blood Glucose.: 206 mg/dL (31 Jan 2025 08:12)  POCT Blood Glucose.: 304 mg/dL (30 Jan 2025 21:06)  POCT Blood Glucose.: 332 mg/dL (30 Jan 2025 16:32)  POCT Blood Glucose.: 269 mg/dL (30 Jan 2025 11:50)    I&O's Summary    30 Jan 2025 07:01  -  31 Jan 2025 07:00  --------------------------------------------------------  IN: 100 mL / OUT: 0 mL / NET: 100 mL    31 Jan 2025 07:01  -  31 Jan 2025 11:28  --------------------------------------------------------  IN: 0 mL / OUT: 440 mL / NET: -440 mL          Appearance: Normal	  HEENT:   Normal oral mucosa, PERRL, EOMI	  Lymphatic: No lymphadenopathy  Cardiovascular: Normal S1 S2, No JVD  Respiratory: Lungs clear to auscultation	  Gastrointestinal:  Soft, Non-tender, + BS	  Skin: No rash, No ecchymoses	  Extremities:   less  edema    LABS:                        9.1    8.94  )-----------( 208      ( 30 Jan 2025 08:14 )             31.4     01-31    132[L]  |  87[L]  |  74[H]  ----------------------------<  304[H]  2.6[LL]   |  38[H]  |  2.88[H]    Ca    8.9      31 Jan 2025 07:10  Phos  4.7     01-31  Mg     2.1     01-31            Urinalysis Basic - ( 31 Jan 2025 07:10 )    Color: x / Appearance: x / SG: x / pH: x  Gluc: 304 mg/dL / Ketone: x  / Bili: x / Urobili: x   Blood: x / Protein: x / Nitrite: x   Leuk Esterase: x / RBC: x / WBC x   Sq Epi: x / Non Sq Epi: x / Bacteria: x              Thyroid Stimulating Hormone, Serum: 2.850 uIU/mL (01-22 @ 06:31)          Consultant(s) Notes Reviewed:      Care Discussed with Consultants/Other Providers:

## 2025-01-31 NOTE — PROVIDER CONTACT NOTE (CRITICAL VALUE NOTIFICATION) - PERSON GIVING RESULT:
[place] / Lab
Lab
Pedro Pablo Alva
[facility]/Lanette Bond
Jenn Sandoval
[employer]
Vidya Combs/ [employer]
Vidya Combs

## 2025-01-31 NOTE — PROVIDER CONTACT NOTE (CRITICAL VALUE NOTIFICATION) - ASSESSMENT
no complaints reported
alert, orientedx4, easily arousable.
No signs of distress noted. Pt is on lasix drip
CHF exacerbation, SOB, saturating well on 3 L NC, resting comfortably in bed with minor cough

## 2025-01-31 NOTE — PROGRESS NOTE ADULT - SUBJECTIVE AND OBJECTIVE BOX
[facility] NEPHROLOGY SERVICES, [facility]  NEPHROLOGY AND HYPERTENSION  [address]  [address]  [place] [address]  [phone]    MD DANIEL GARRETT MD YELENA ROSENBERG, MD BINNY KOSHY, MD CHRISTOPHER CAPUTO, MD EDWARD BOVER, MD          Patient events noted  Feels well no distress       MEDICATIONS  (STANDING):  atorvastatin 20 milliGRAM(s) Oral at bedtime  dextrose 5%. 1000 milliLiter(s) (50 mL/Hr) IV Continuous <Continuous>  dextrose 5%. 1000 milliLiter(s) (100 mL/Hr) IV Continuous <Continuous>  dextrose 50% Injectable 25 Gram(s) IV Push once  dextrose 50% Injectable 25 Gram(s) IV Push once  dextrose 50% Injectable 12.5 Gram(s) IV Push once  fluticasone propionate/ salmeterol 250-50 MICROgram(s) Diskus 1 Dose(s) Inhalation two times a day  glucagon  Injectable 1 milliGRAM(s) IntraMuscular once  glucagon  Injectable 1 milliGRAM(s) IntraMuscular once  influenza  Vaccine (HIGH DOSE) 0.5 milliLiter(s) IntraMuscular once  insulin glargine Injectable (LANTUS) 32 Unit(s) SubCutaneous at bedtime  insulin lispro (ADMELOG) corrective regimen sliding scale   SubCutaneous three times a day before meals  insulin lispro Injectable (ADMELOG) 3 Unit(s) SubCutaneous three times a day before meals  metoprolol succinate ER 25 milliGRAM(s) Oral daily  pantoprazole    Tablet 40 milliGRAM(s) Oral two times a day  rivaroxaban 15 milliGRAM(s) Oral with dinner  tamsulosin 0.4 milliGRAM(s) Oral at bedtime  tiotropium 2.5 MICROgram(s) Inhaler 2 Puff(s) Inhalation daily    MEDICATIONS  (PRN):  acetaminophen     Tablet .. 650 milliGRAM(s) Oral every 6 hours PRN Temp greater or equal to 38C (100.4F), Mild Pain (1 - 3)  albuterol/ipratropium for Nebulization.. 3 milliLiter(s) Inhalation every 6 hours PRN -for shortness of breath and/or wheezing  dextrose Oral Gel 15 Gram(s) Oral once PRN Blood Glucose LESS THAN 70 milliGRAM(s)/deciliter      25 @ 07:25 @ 07:00  --------------------------------------------------------  IN: 100 mL / OUT: 0 mL / NET: 100 mL    25 @ 07:  -  25 @ 18:45  --------------------------------------------------------  IN: 585 mL / OUT: 640 mL / NET: -55 mL      PHYSICAL EXAM:      T(C): 37.3 (25 @ 16:04), Max: 37.3 (25 @ 16:04)  HR: 71 (25 16:04) (70 - 89)  BP: 105/60 (25 @ 16:04) (100/59 - 135/73)  RR: 18 (25 @ 16:04) (18 - 18)  SpO2: 94% (25 @ 16:04) (94% - 100%)  Wt(kg): --  Lungs clear  Heart S1S2  Abd soft NT ND  Extremities:   1-2 + edema        Ferritin (01.15.25 @ 20:10)    Ferritin: 178 ng/mL    Iron with Total Binding Capacity (25 @ 00:31)    Iron - Total Binding Capacity.: 429 ug/dL   % Saturation, Iron: 4 %   Iron Total: 18: Mild hemolysis. Results may be falsely elevated. ug/dL   Unsaturated Iron Binding Capacity: 411: Mild hemolysis. Results may be falsely elevated. 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

## 2025-01-31 NOTE — PROGRESS NOTE ADULT - ASSESSMENT
79M        h/o   CAD /CABG,  HFrEF 25%, ICD,  c/c  Afib , COPD , on home o2,  HTN/ HLD. DM          admitted  with   sob          from   anemia requiring PRBC transfusion  per chart and  from acute on c/c  systolic chf      Ef,  6/2024,   25,   and  diastolic  dysfunction,, mod  TR, mod  pHtn     Cardiomyopathy  AICD      s/p bumex drip-> lasix 80 iv-> now 2/2 contraction alkalosis      +Lt  brachial/  cephalic  V  phlebitis    DENISHA on CKD. stage  3. renal d r shaye    c/c  Afib, on xarelto    c/c  anemia,  hb in 8 range. seen by  gi     DM, on lantus    obesity, BMI  35    lipitor, lasix,   torpol, lantus, xarelto.      crt  is    2.8. hco3  38,  perhaps  lasix  to  be lowered.  renal  to  titrate      net negative  with decrease  in pedal  edema   wife at  bedside    total encounter time   35  minutes        pt is dnr/dni          rad< from: TTE Echo Complete w/ Contrast w/ Doppler (08.08.24 @ 13:27) >  CONCLUSIONS:   1. Left ventricular cavity is normal in size. Left ventricular systolic   function is severely decreased with an ejection fraction visually   estimated at 25 to 30 %. Global left ventricular hypokinesis.   2. There is severe (grade 3) left ventricular diastolic dysfunction,   with elevated left ventricular filling pressure.   3. The right ventricle is not well visualized.   4. Device lead is visualized in the right heart.   5. No significant valvular disease.   6. No pericardial effusion seen.   7. Estimated pulmonary artery systolic pressure is 53 mmHg, consistent   with moderate pulmonary hypertension.   8. Left and moderate left pleural effusion noted.   9. Mild pulmonic regurgitation.  10. Moderate tricuspid regurgitation______________________________________________________________________  ____________  FINDINGS:  < end of copied text >           rad

## 2025-01-31 NOTE — PROGRESS NOTE ADULT - ASSESSMENT
79M with CAD/CABG, TAVR, HFrEF 25%, ICD, afib, copd on home o2, htn hld dm p/w adhf, acute anemia requiring PRBC transfusion     remains volume overloaded    s/p bumex drip-> lasix 80 iv-> d/c'd 2/2 contraction alkalosis, renal recs appreciated, s/p acetazolamide dosing, bicarb now improved to 34, now restarted on diuretics-> Lasix 80 IV q 8h ->now on Lasix drip at 30mg/hr, metolazone 10/d   if hypervolemia persists despite diuretic use, will discuss with pt/family regarding further options again as per renal, may need HD    monitor renal function, electrolytes, bicarb  cont on Toprol 25/d  add further GDMT for cardiomyopathy as BP and renal function allows  +Lt UE DVT - on AC, cont on Xarelto, home med  cont with COPD management   increase activity as tolerated  79M with CAD/CABG, TAVR, HFrEF 25%, ICD, afib, copd on home o2, htn hld dm p/w adhf, acute anemia requiring PRBC transfusion     remains volume overloaded    s/p bumex drip-> lasix 80 iv-> d/c'd 2/2 contraction alkalosis, s/p acetazolamide dosing, bicarb improved, 1/27 restarted on diuretics-> Lasix 80 IV q 8h -> Lasix drip at 30mg/hr, metolazone 10/d ->now d/c'd as hypokalemia persists, renal following, monitor renal function, electrolytes, bicarb  cont on Toprol 25/d  add further GDMT for cardiomyopathy as BP and renal function allows  +Lt UE DVT - on AC, cont on Xarelto, home med  cont with COPD management   increase activity as tolerated   DNR/DNI

## 2025-01-31 NOTE — PROGRESS NOTE ADULT - SUBJECTIVE AND OBJECTIVE BOX
Patient is a 79y old  Male who presents with a chief complaint of worsening sob and edema     PAST MEDICAL & SURGICAL HISTORY:  HTN (hypertension)    COPD (chronic obstructive pulmonary disease)    HLD (hyperlipidemia)    Insulin dependent type 2 diabetes mellitus    Chronic hypoxic respiratory failure, on home oxygen therapy    Stage 4 chronic kidney disease    Chronic combined systolic and diastolic heart failure    Unspecified atrial fibrillation    S/P CABG x 3 in 2003    s/p TAVR    S/P hernia repair  hernia [id number]    S/P implantation of automatic cardioverter/defibrillator (AICD)    INTERVAL HISTORY:  	  MEDICATIONS:  MEDICATIONS  (STANDING):  atorvastatin 20 milliGRAM(s) Oral at bedtime  fluticasone propionate/ salmeterol 250-50 MICROgram(s) Diskus 1 Dose(s) Inhalation two times a day  furosemide Infusion 30 mG/Hr (15 mL/Hr) IV Continuous <Continuous>  insulin glargine Injectable (LANTUS) 32 Unit(s) SubCutaneous at bedtime  insulin lispro (ADMELOG) corrective regimen sliding scale   SubCutaneous three times a day before meals  insulin lispro Injectable (ADMELOG) 3 Unit(s) SubCutaneous three times a day before meals  metolazone 10 milliGRAM(s) Oral daily  metoprolol succinate ER 25 milliGRAM(s) Oral daily  pantoprazole    Tablet 40 milliGRAM(s) Oral two times a day  rivaroxaban 15 milliGRAM(s) Oral with dinner  tamsulosin 0.4 milliGRAM(s) Oral at bedtime  tiotropium 2.5 MICROgram(s) Inhaler 2 Puff(s) Inhalation daily    MEDICATIONS  (PRN):  acetaminophen     Tablet .. 650 milliGRAM(s) Oral every 6 hours PRN Temp greater or equal to 38C (100.4F), Mild Pain (1 - 3)  albuterol/ipratropium for Nebulization.. 3 milliLiter(s) Inhalation every 6 hours PRN -for shortness of breath and/or wheezing  dextrose Oral Gel 15 Gram(s) Oral once PRN Blood Glucose LESS THAN 70 milliGRAM(s)/deciliter    Vitals:  T(F): 97.3 (01-31-25 @ 04:53), Max: 99.7 (01-30-25 @ 16:14)  HR: 76 (01-31-25 @ 07:00) (70 - 89)  BP: 105/68 (01-31-25 @ 04:53) (105/68 - 135/73)  RR: 18 (01-31-25 @ 04:53) (18 - 18)  SpO2: 99% (01-31-25 @ 07:00) (94% - 100%)    01-30 @ 07:01  -  01-31 @ 07:00  --------------------------------------------------------  IN:    Oral Fluid: 100 mL  Total IN: 100 mL    OUT:  Total OUT: 0 mL    Total NET: 100 mL    01-31 @ 07:01  -  01-31 @ 09:42  --------------------------------------------------------  IN:  Total IN: 0 mL    OUT:    Voided (mL): 440 mL  Total OUT: 440 mL    Total NET: -440 mL    PHYSICAL EXAM:  Neuro: Awake, responsive  CV: S1 S2 RRR +SM  Lungs: diminished to bases  GI: Soft, BS +, ND, NT  Extremities: + anasarca    RADIOLOGY: < from: Xray Chest 1 View-PORTABLE IMMEDIATE (Xray Chest 1 View-PORTABLE IMMEDIATE .) (01.25.25 @ 10:52) >  IMPRESSION: There is persistent cardiomegaly. Poststernotomy, CABG and   TAVR. Mild pulmonary venous congestive changes with persistent hazy   opacity over the left lower lung zone. Left-sided pleural effusion, left   lower lung zone pneumonia and/or atelectasis cannot be excluded.    < end of copied text >    DIAGNOSTIC TESTING:    [x ] Echocardiogram: < from: TTE Echo Complete w/ Contrast w/ Doppler (08.08.24 @ 13:27) >     1. Left ventricular cavity is normal in size. Left ventricular systolic   function is severely decreased with an ejection fraction visually   estimated at 25 to 30 %. Global left ventricular hypokinesis.   2. There is severe (grade 3) left ventricular diastolic dysfunction,   with elevated left ventricular filling pressure.   3. The right ventricle is not well visualized.   4. Device lead is visualized in the right heart.   5. No significant valvular disease.   6. No pericardial effusion seen.   7. Estimated pulmonary artery systolic pressure is 53 mmHg, consistent   with moderate pulmonary hypertension.   8. Left and moderate left pleural effusion noted.   9. Mild pulmonic regurgitation.  10. Moderate tricuspid regurgitation.    < end of copied text >     < from: NM Pharm Stress Test, Dual Isotope (03.17.21 @ 12:40) >  1. There was scintigraphic evidence for a myocardial infarct in the RCA territory with no significant ischemia.    2. There is severely abnormal left ventricular contractility, globally diminished calculated ejection fraction and no wall motion abnormlaities. Overall post stress ejection fraction was 21%    < end of copied text >    LABS:	 	    31 Jan 2025 07:10    132    |  87     |  74     ----------------------------<  304    2.6     |  38     |  2.88   30 Jan 2025 12:47    134    |  91     |  75     ----------------------------<  280    3.9     |  37     |  3.27   30 Jan 2025 08:14    135    |  91     |  74     ----------------------------<  201    2.9     |  36     |  3.06     Ca    8.9        31 Jan 2025 07:10                        9.1    8.94  )-----------( 208      ( 30 Jan 2025 08:14 )             31.4 ,                       9.0    9.05  )-----------( 191      ( 29 Jan 2025 07:55 )             31.5   pro-BNP: Pro-Brain Natriuretic Peptide: 7384 pg/mL (01.28.25 @ 06:06)    Thyroid Stimulating Hormone, Serum: 2.850 uIU/mL (01.22.25 @ 06:31)                   Patient is a 79y old  Male who presents with a chief complaint of worsening sob and edema     PAST MEDICAL & SURGICAL HISTORY:  HTN (hypertension)    COPD (chronic obstructive pulmonary disease)    HLD (hyperlipidemia)    Insulin dependent type 2 diabetes mellitus    Chronic hypoxic respiratory failure, on home oxygen therapy    Stage 4 chronic kidney disease    Chronic combined systolic and diastolic heart failure    Unspecified atrial fibrillation    S/P CABG x 3 in 2003    s/p TAVR    S/P hernia repair  hernia [id number]    S/P implantation of automatic cardioverter/defibrillator (AICD)    INTERVAL HISTORY: still with mild dyspnea, hypervolemia persists   	  MEDICATIONS:  MEDICATIONS  (STANDING):  atorvastatin 20 milliGRAM(s) Oral at bedtime  fluticasone propionate/ salmeterol 250-50 MICROgram(s) Diskus 1 Dose(s) Inhalation two times a day  furosemide Infusion 30 mG/Hr (15 mL/Hr) IV Continuous <Continuous>  insulin glargine Injectable (LANTUS) 32 Unit(s) SubCutaneous at bedtime  insulin lispro (ADMELOG) corrective regimen sliding scale   SubCutaneous three times a day before meals  insulin lispro Injectable (ADMELOG) 3 Unit(s) SubCutaneous three times a day before meals  metolazone 10 milliGRAM(s) Oral daily  metoprolol succinate ER 25 milliGRAM(s) Oral daily  pantoprazole    Tablet 40 milliGRAM(s) Oral two times a day  rivaroxaban 15 milliGRAM(s) Oral with dinner  tamsulosin 0.4 milliGRAM(s) Oral at bedtime  tiotropium 2.5 MICROgram(s) Inhaler 2 Puff(s) Inhalation daily    MEDICATIONS  (PRN):  acetaminophen     Tablet .. 650 milliGRAM(s) Oral every 6 hours PRN Temp greater or equal to 38C (100.4F), Mild Pain (1 - 3)  albuterol/ipratropium for Nebulization.. 3 milliLiter(s) Inhalation every 6 hours PRN -for shortness of breath and/or wheezing  dextrose Oral Gel 15 Gram(s) Oral once PRN Blood Glucose LESS THAN 70 milliGRAM(s)/deciliter    Vitals:  T(F): 97.3 (01-31-25 @ 04:53), Max: 99.7 (01-30-25 @ 16:14)  HR: 76 (01-31-25 @ 07:00) (70 - 89)  BP: 105/68 (01-31-25 @ 04:53) (105/68 - 135/73)  RR: 18 (01-31-25 @ 04:53) (18 - 18)  SpO2: 99% (01-31-25 @ 07:00) (94% - 100%)    01-30 @ 07:01  -  01-31 @ 07:00  --------------------------------------------------------  IN:    Oral Fluid: 100 mL  Total IN: 100 mL    OUT:  Total OUT: 0 mL    Total NET: 100 mL    01-31 @ 07:01  -  01-31 @ 09:42  --------------------------------------------------------  IN:  Total IN: 0 mL    OUT:    Voided (mL): 440 mL  Total OUT: 440 mL    Total NET: -440 mL    PHYSICAL EXAM:  Neuro: Awake, responsive  CV: S1 S2 RRR +SM  Lungs: diminished to bases  GI: Softly distrended, BS +, NT, abd wall edema   Extremities: + anasarca    RADIOLOGY: < from: Xray Chest 1 View-PORTABLE IMMEDIATE (Xray Chest 1 View-PORTABLE IMMEDIATE .) (01.25.25 @ 10:52) >  IMPRESSION: There is persistent cardiomegaly. Poststernotomy, CABG and   TAVR. Mild pulmonary venous congestive changes with persistent hazy   opacity over the left lower lung zone. Left-sided pleural effusion, left   lower lung zone pneumonia and/or atelectasis cannot be excluded.    < end of copied text >    DIAGNOSTIC TESTING:    [x ] Echocardiogram: < from: TTE Echo Complete w/ Contrast w/ Doppler (08.08.24 @ 13:27) >     1. Left ventricular cavity is normal in size. Left ventricular systolic   function is severely decreased with an ejection fraction visually   estimated at 25 to 30 %. Global left ventricular hypokinesis.   2. There is severe (grade 3) left ventricular diastolic dysfunction,   with elevated left ventricular filling pressure.   3. The right ventricle is not well visualized.   4. Device lead is visualized in the right heart.   5. No significant valvular disease.   6. No pericardial effusion seen.   7. Estimated pulmonary artery systolic pressure is 53 mmHg, consistent   with moderate pulmonary hypertension.   8. Left and moderate left pleural effusion noted.   9. Mild pulmonic regurgitation.  10. Moderate tricuspid regurgitation.    < end of copied text >     < from: NM Pharm Stress Test, Dual Isotope (03.17.21 @ 12:40) >  1. There was scintigraphic evidence for a myocardial infarct in the RCA territory with no significant ischemia.    2. There is severely abnormal left ventricular contractility, globally diminished calculated ejection fraction and no wall motion abnormlaities. Overall post stress ejection fraction was 21%    < end of copied text >    LABS:	 	    31 Jan 2025 07:10    132    |  87     |  74     ----------------------------<  304    2.6     |  38     |  2.88   30 Jan 2025 12:47    134    |  91     |  75     ----------------------------<  280    3.9     |  37     |  3.27   30 Jan 2025 08:14    135    |  91     |  74     ----------------------------<  201    2.9     |  36     |  3.06     Ca    8.9        31 Jan 2025 07:10                        9.1    8.94  )-----------( 208      ( 30 Jan 2025 08:14 )             31.4 ,                       9.0    9.05  )-----------( 191      ( 29 Jan 2025 07:55 )             31.5   pro-BNP: Pro-Brain Natriuretic Peptide: 7384 pg/mL (01.28.25 @ 06:06)    Thyroid Stimulating Hormone, Serum: 2.850 uIU/mL (01.22.25 @ 06:31)                   Patient is a 79y old  Male who presents with a chief complaint of worsening sob and edema     PAST MEDICAL & SURGICAL HISTORY:  HTN (hypertension)    CAD    COPD (chronic obstructive pulmonary disease)    HLD (hyperlipidemia)    Insulin dependent type 2 diabetes mellitus    Chronic hypoxic respiratory failure, on home oxygen therapy    Stage 4 chronic kidney disease    Chronic combined systolic and diastolic heart failure    Unspecified atrial fibrillation    S/P CABG x 3 in 2003    s/p TAVR    S/P hernia repair  hernia [id number]    S/P implantation of automatic cardioverter/defibrillator (AICD)    INTERVAL HISTORY: still with mild dyspnea, hypervolemia persists   	  MEDICATIONS:  MEDICATIONS  (STANDING):  atorvastatin 20 milliGRAM(s) Oral at bedtime  fluticasone propionate/ salmeterol 250-50 MICROgram(s) Diskus 1 Dose(s) Inhalation two times a day  furosemide Infusion 30 mG/Hr (15 mL/Hr) IV Continuous <Continuous>  insulin glargine Injectable (LANTUS) 32 Unit(s) SubCutaneous at bedtime  insulin lispro (ADMELOG) corrective regimen sliding scale   SubCutaneous three times a day before meals  insulin lispro Injectable (ADMELOG) 3 Unit(s) SubCutaneous three times a day before meals  metolazone 10 milliGRAM(s) Oral daily  metoprolol succinate ER 25 milliGRAM(s) Oral daily  pantoprazole    Tablet 40 milliGRAM(s) Oral two times a day  rivaroxaban 15 milliGRAM(s) Oral with dinner  tamsulosin 0.4 milliGRAM(s) Oral at bedtime  tiotropium 2.5 MICROgram(s) Inhaler 2 Puff(s) Inhalation daily    MEDICATIONS  (PRN):  acetaminophen     Tablet .. 650 milliGRAM(s) Oral every 6 hours PRN Temp greater or equal to 38C (100.4F), Mild Pain (1 - 3)  albuterol/ipratropium for Nebulization.. 3 milliLiter(s) Inhalation every 6 hours PRN -for shortness of breath and/or wheezing  dextrose Oral Gel 15 Gram(s) Oral once PRN Blood Glucose LESS THAN 70 milliGRAM(s)/deciliter    Vitals:  T(F): 97.3 (01-31-25 @ 04:53), Max: 99.7 (01-30-25 @ 16:14)  HR: 76 (01-31-25 @ 07:00) (70 - 89)  BP: 105/68 (01-31-25 @ 04:53) (105/68 - 135/73)  RR: 18 (01-31-25 @ 04:53) (18 - 18)  SpO2: 99% (01-31-25 @ 07:00) (94% - 100%)    01-30 @ 07:01  -  01-31 @ 07:00  --------------------------------------------------------  IN:    Oral Fluid: 100 mL  Total IN: 100 mL    OUT:  Total OUT: 0 mL    Total NET: 100 mL    01-31 @ 07:01  -  01-31 @ 09:42  --------------------------------------------------------  IN:  Total IN: 0 mL    OUT:    Voided (mL): 440 mL  Total OUT: 440 mL    Total NET: -440 mL    PHYSICAL EXAM:  Neuro: Awake, responsive  CV: S1 S2 RRR +SM  Lungs: diminished to bases  GI: Softly distended, BS +, NT, abd wall edema   Extremities: + anasarca    Paced on telemetry     RADIOLOGY: < from: Xray Chest 1 View-PORTABLE IMMEDIATE (Xray Chest 1 View-PORTABLE IMMEDIATE .) (01.25.25 @ 10:52) >  IMPRESSION: There is persistent cardiomegaly. Poststernotomy, CABG and   TAVR. Mild pulmonary venous congestive changes with persistent hazy   opacity over the left lower lung zone. Left-sided pleural effusion, left   lower lung zone pneumonia and/or atelectasis cannot be excluded.    < end of copied text >    DIAGNOSTIC TESTING:    [x ] Echocardiogram: < from: TTE Echo Complete w/ Contrast w/ Doppler (08.08.24 @ 13:27) >     1. Left ventricular cavity is normal in size. Left ventricular systolic   function is severely decreased with an ejection fraction visually   estimated at 25 to 30 %. Global left ventricular hypokinesis.   2. There is severe (grade 3) left ventricular diastolic dysfunction,   with elevated left ventricular filling pressure.   3. The right ventricle is not well visualized.   4. Device lead is visualized in the right heart.   5. No significant valvular disease.   6. No pericardial effusion seen.   7. Estimated pulmonary artery systolic pressure is 53 mmHg, consistent   with moderate pulmonary hypertension.   8. Left and moderate left pleural effusion noted.   9. Mild pulmonic regurgitation.  10. Moderate tricuspid regurgitation.    < end of copied text >     < from: NM Pharm Stress Test, Dual Isotope (03.17.21 @ 12:40) >  1. There was scintigraphic evidence for a myocardial infarct in the RCA territory with no significant ischemia.    2. There is severely abnormal left ventricular contractility, globally diminished calculated ejection fraction and no wall motion abnormlaities. Overall post stress ejection fraction was 21%    < end of copied text >    LABS:	 	    31 Jan 2025 07:10    132    |  87     |  74     ----------------------------<  304    2.6     |  38     |  2.88   30 Jan 2025 12:47    134    |  91     |  75     ----------------------------<  280    3.9     |  37     |  3.27   30 Jan 2025 08:14    135    |  91     |  74     ----------------------------<  201    2.9     |  36     |  3.06     Ca    8.9        31 Jan 2025 07:10                        9.1    8.94  )-----------( 208      ( 30 Jan 2025 08:14 )             31.4 ,                       9.0    9.05  )-----------( 191      ( 29 Jan 2025 07:55 )             31.5   pro-BNP: Pro-Brain Natriuretic Peptide: 7384 pg/mL (01.28.25 @ 06:06)    Thyroid Stimulating Hormone, Serum: 2.850 uIU/mL (01.22.25 @ 06:31)                   Patient is a 79y old  Male who presents with a chief complaint of worsening sob and edema     PAST MEDICAL & SURGICAL HISTORY:  HTN (hypertension)    CAD    COPD (chronic obstructive pulmonary disease)    HLD (hyperlipidemia)    Insulin dependent type 2 diabetes mellitus    Chronic hypoxic respiratory failure, on home oxygen therapy    Stage 4 chronic kidney disease    Chronic combined systolic and diastolic heart failure    Unspecified atrial fibrillation    S/P CABG x 3 in 2003    s/p TAVR    S/P hernia repair  hernia [id number]    S/P implantation of automatic cardioverter/defibrillator (AICD)    INTERVAL HISTORY: still with mild dyspnea, hypervolemia persists   	  MEDICATIONS:  MEDICATIONS  (STANDING):  atorvastatin 20 milliGRAM(s) Oral at bedtime  fluticasone propionate/ salmeterol 250-50 MICROgram(s) Diskus 1 Dose(s) Inhalation two times a day  insulin glargine Injectable (LANTUS) 32 Unit(s) SubCutaneous at bedtime  insulin lispro (ADMELOG) corrective regimen sliding scale   SubCutaneous three times a day before meals  insulin lispro Injectable (ADMELOG) 3 Unit(s) SubCutaneous three times a day before meals  metoprolol succinate ER 25 milliGRAM(s) Oral daily  pantoprazole    Tablet 40 milliGRAM(s) Oral two times a day  rivaroxaban 15 milliGRAM(s) Oral with dinner  tamsulosin 0.4 milliGRAM(s) Oral at bedtime  tiotropium 2.5 MICROgram(s) Inhaler 2 Puff(s) Inhalation daily    MEDICATIONS  (PRN):  acetaminophen     Tablet .. 650 milliGRAM(s) Oral every 6 hours PRN Temp greater or equal to 38C (100.4F), Mild Pain (1 - 3)  albuterol/ipratropium for Nebulization.. 3 milliLiter(s) Inhalation every 6 hours PRN -for shortness of breath and/or wheezing  dextrose Oral Gel 15 Gram(s) Oral once PRN Blood Glucose LESS THAN 70 milliGRAM(s)/deciliter    Vitals:  T(F): 97.3 (01-31-25 @ 04:53), Max: 99.7 (01-30-25 @ 16:14)  HR: 76 (01-31-25 @ 07:00) (70 - 89)  BP: 105/68 (01-31-25 @ 04:53) (105/68 - 135/73)  RR: 18 (01-31-25 @ 04:53) (18 - 18)  SpO2: 99% (01-31-25 @ 07:00) (94% - 100%)    01-30 @ 07:01  -  01-31 @ 07:00  --------------------------------------------------------  IN:    Oral Fluid: 100 mL  Total IN: 100 mL    OUT:  Total OUT: 0 mL    Total NET: 100 mL    01-31 @ 07:01  -  01-31 @ 09:42  --------------------------------------------------------  IN:  Total IN: 0 mL    OUT:    Voided (mL): 440 mL  Total OUT: 440 mL    Total NET: -440 mL    PHYSICAL EXAM:  Neuro: Awake, responsive  CV: S1 S2 RRR +SM  Lungs: diminished to bases  GI: Softly distended, BS +, NT, abd wall edema   Extremities: + anasarca    Paced on telemetry     RADIOLOGY: < from: Xray Chest 1 View-PORTABLE IMMEDIATE (Xray Chest 1 View-PORTABLE IMMEDIATE .) (01.25.25 @ 10:52) >  IMPRESSION: There is persistent cardiomegaly. Poststernotomy, CABG and   TAVR. Mild pulmonary venous congestive changes with persistent hazy   opacity over the left lower lung zone. Left-sided pleural effusion, left   lower lung zone pneumonia and/or atelectasis cannot be excluded.    < end of copied text >    DIAGNOSTIC TESTING:    [x ] Echocardiogram: < from: TTE Echo Complete w/ Contrast w/ Doppler (08.08.24 @ 13:27) >     1. Left ventricular cavity is normal in size. Left ventricular systolic   function is severely decreased with an ejection fraction visually   estimated at 25 to 30 %. Global left ventricular hypokinesis.   2. There is severe (grade 3) left ventricular diastolic dysfunction,   with elevated left ventricular filling pressure.   3. The right ventricle is not well visualized.   4. Device lead is visualized in the right heart.   5. No significant valvular disease.   6. No pericardial effusion seen.   7. Estimated pulmonary artery systolic pressure is 53 mmHg, consistent   with moderate pulmonary hypertension.   8. Left and moderate left pleural effusion noted.   9. Mild pulmonic regurgitation.  10. Moderate tricuspid regurgitation.    < end of copied text >     < from: NM Pharm Stress Test, Dual Isotope (03.17.21 @ 12:40) >  1. There was scintigraphic evidence for a myocardial infarct in the RCA territory with no significant ischemia.    2. There is severely abnormal left ventricular contractility, globally diminished calculated ejection fraction and no wall motion abnormlaities. Overall post stress ejection fraction was 21%    < end of copied text >    LABS:	 	    31 Jan 2025 07:10    132    |  87     |  74     ----------------------------<  304    2.6     |  38     |  2.88   30 Jan 2025 12:47    134    |  91     |  75     ----------------------------<  280    3.9     |  37     |  3.27   30 Jan 2025 08:14    135    |  91     |  74     ----------------------------<  201    2.9     |  36     |  3.06     Ca    8.9        31 Jan 2025 07:10                        9.1    8.94  )-----------( 208      ( 30 Jan 2025 08:14 )             31.4 ,                       9.0    9.05  )-----------( 191      ( 29 Jan 2025 07:55 )             31.5   pro-BNP: Pro-Brain Natriuretic Peptide: 7384 pg/mL (01.28.25 @ 06:06)    Thyroid Stimulating Hormone, Serum: 2.850 uIU/mL (01.22.25 @ 06:31)

## 2025-01-31 NOTE — PROVIDER CONTACT NOTE (CRITICAL VALUE NOTIFICATION) - SITUATION
history of COPD ;pt on home O2 at 3-4LPM and uses CPAP at bedtime as per pt's spouse.
Received call from lab stating potassium 2.6
Potassium of 2.9
hgb 6.5

## 2025-01-31 NOTE — PROVIDER CONTACT NOTE (CRITICAL VALUE NOTIFICATION) - TEST AND RESULT REPORTED:
aptt above 200
Potassium 2.7
Potassium 2.6
Potassium 2.9
Potassium of 2.9
hgb 6.5
CO2 greater than 45
Potassium 2.9

## 2025-01-31 NOTE — PROVIDER CONTACT NOTE (CRITICAL VALUE NOTIFICATION) - NAME OF MD/NP/PA/DO NOTIFIED:
[facility]ETH Medina
[facility]
ELIZABETH Flood
ELIZABETH Villegas
Nelly Arellano, NP
ELIZABETH Damon
house PA
dr. corbin

## 2025-02-01 LAB
ALBUMIN SERPL ELPH-MCNC: 2.8 G/DL — LOW (ref 3.3–5)
ALP SERPL-CCNC: 288 U/L — HIGH (ref 40–120)
ALT FLD-CCNC: 25 U/L — SIGNIFICANT CHANGE UP (ref 12–78)
ANION GAP SERPL CALC-SCNC: 6 MMOL/L — SIGNIFICANT CHANGE UP (ref 5–17)
ANION GAP SERPL CALC-SCNC: 9 MMOL/L — SIGNIFICANT CHANGE UP (ref 5–17)
AST SERPL-CCNC: 47 U/L — HIGH (ref 15–37)
BILIRUB SERPL-MCNC: 1 MG/DL — SIGNIFICANT CHANGE UP (ref 0.2–1.2)
BUN SERPL-MCNC: 75 MG/DL — HIGH (ref 7–23)
BUN SERPL-MCNC: 76 MG/DL — HIGH (ref 7–23)
CALCIUM SERPL-MCNC: 8.8 MG/DL — SIGNIFICANT CHANGE UP (ref 8.5–10.1)
CALCIUM SERPL-MCNC: 9.4 MG/DL — SIGNIFICANT CHANGE UP (ref 8.5–10.1)
CHLORIDE SERPL-SCNC: 90 MMOL/L — LOW (ref 96–108)
CHLORIDE SERPL-SCNC: 91 MMOL/L — LOW (ref 96–108)
CO2 SERPL-SCNC: 37 MMOL/L — HIGH (ref 22–31)
CO2 SERPL-SCNC: 38 MMOL/L — HIGH (ref 22–31)
CREAT SERPL-MCNC: 2.96 MG/DL — HIGH (ref 0.5–1.3)
CREAT SERPL-MCNC: 3.01 MG/DL — HIGH (ref 0.5–1.3)
EGFR: 20 ML/MIN/1.73M2 — LOW
EGFR: 21 ML/MIN/1.73M2 — LOW
GLUCOSE BLDC GLUCOMTR-MCNC: 175 MG/DL — HIGH (ref 70–99)
GLUCOSE BLDC GLUCOMTR-MCNC: 181 MG/DL — HIGH (ref 70–99)
GLUCOSE BLDC GLUCOMTR-MCNC: 223 MG/DL — HIGH (ref 70–99)
GLUCOSE BLDC GLUCOMTR-MCNC: 290 MG/DL — HIGH (ref 70–99)
GLUCOSE SERPL-MCNC: 153 MG/DL — HIGH (ref 70–99)
GLUCOSE SERPL-MCNC: 211 MG/DL — HIGH (ref 70–99)
MAGNESIUM SERPL-MCNC: 2.1 MG/DL — SIGNIFICANT CHANGE UP (ref 1.6–2.6)
POTASSIUM SERPL-MCNC: 3.1 MMOL/L — LOW (ref 3.5–5.3)
POTASSIUM SERPL-MCNC: 4 MMOL/L — SIGNIFICANT CHANGE UP (ref 3.5–5.3)
POTASSIUM SERPL-SCNC: 3.1 MMOL/L — LOW (ref 3.5–5.3)
POTASSIUM SERPL-SCNC: 4 MMOL/L — SIGNIFICANT CHANGE UP (ref 3.5–5.3)
PROT SERPL-MCNC: 7.1 GM/DL — SIGNIFICANT CHANGE UP (ref 6–8.3)
SODIUM SERPL-SCNC: 135 MMOL/L — SIGNIFICANT CHANGE UP (ref 135–145)
SODIUM SERPL-SCNC: 136 MMOL/L — SIGNIFICANT CHANGE UP (ref 135–145)

## 2025-02-01 PROCEDURE — 99232 SBSQ HOSP IP/OBS MODERATE 35: CPT

## 2025-02-01 PROCEDURE — 99233 SBSQ HOSP IP/OBS HIGH 50: CPT

## 2025-02-01 RX ORDER — POTASSIUM CHLORIDE 750 MG/1
40 TABLET, EXTENDED RELEASE ORAL DAILY
Refills: 0 | Status: DISCONTINUED | OUTPATIENT
Start: 2025-02-02 | End: 2025-02-03

## 2025-02-01 RX ORDER — POTASSIUM CHLORIDE 750 MG/1
40 TABLET, EXTENDED RELEASE ORAL EVERY 4 HOURS
Refills: 0 | Status: COMPLETED | OUTPATIENT
Start: 2025-02-01 | End: 2025-02-01

## 2025-02-01 RX ADMIN — Medication 25 MILLIGRAM(S): at 05:23

## 2025-02-01 RX ADMIN — INSULIN GLARGINE-YFGN 32 UNIT(S): 100 INJECTION, SOLUTION SUBCUTANEOUS at 22:11

## 2025-02-01 RX ADMIN — Medication 1: at 08:03

## 2025-02-01 RX ADMIN — Medication 2: at 11:33

## 2025-02-01 RX ADMIN — Medication 3 UNIT(S): at 11:33

## 2025-02-01 RX ADMIN — IPRATROPIUM BROMIDE AND ALBUTEROL SULFATE 3 MILLILITER(S): .5; 2.5 SOLUTION RESPIRATORY (INHALATION) at 15:34

## 2025-02-01 RX ADMIN — FLUTICASONE PROPIONATE AND SALMETEROL 1 DOSE(S): 113; 14 POWDER, METERED RESPIRATORY (INHALATION) at 17:26

## 2025-02-01 RX ADMIN — POTASSIUM CHLORIDE 40 MILLIEQUIVALENT(S): 750 TABLET, EXTENDED RELEASE ORAL at 13:18

## 2025-02-01 RX ADMIN — POTASSIUM CHLORIDE 40 MILLIEQUIVALENT(S): 750 TABLET, EXTENDED RELEASE ORAL at 17:22

## 2025-02-01 RX ADMIN — ATORVASTATIN CALCIUM 20 MILLIGRAM(S): 80 TABLET, FILM COATED ORAL at 22:15

## 2025-02-01 RX ADMIN — Medication 1: at 17:21

## 2025-02-01 RX ADMIN — Medication 80 MILLIGRAM(S): at 17:21

## 2025-02-01 RX ADMIN — Medication 3 UNIT(S): at 08:03

## 2025-02-01 RX ADMIN — Medication 3 UNIT(S): at 17:21

## 2025-02-01 RX ADMIN — TAMSULOSIN HYDROCHLORIDE 0.4 MILLIGRAM(S): 0.4 CAPSULE ORAL at 22:11

## 2025-02-01 RX ADMIN — FLUTICASONE PROPIONATE AND SALMETEROL 1 DOSE(S): 113; 14 POWDER, METERED RESPIRATORY (INHALATION) at 05:23

## 2025-02-01 RX ADMIN — TIOTROPIUM BROMIDE MONOHYDRATE 2 PUFF(S): 18 CAPSULE ORAL; RESPIRATORY (INHALATION) at 11:32

## 2025-02-01 RX ADMIN — PANTOPRAZOLE 40 MILLIGRAM(S): 20 TABLET, DELAYED RELEASE ORAL at 05:23

## 2025-02-01 RX ADMIN — PANTOPRAZOLE 40 MILLIGRAM(S): 20 TABLET, DELAYED RELEASE ORAL at 17:21

## 2025-02-01 RX ADMIN — RIVAROXABAN 15 MILLIGRAM(S): 20 TABLET, FILM COATED ORAL at 17:21

## 2025-02-01 NOTE — PROGRESS NOTE ADULT - SUBJECTIVE AND OBJECTIVE BOX
Patient is a 79y old  Male who presents with a chief complaint of Acute on chronic combined HFpEF and HFrEF exacerbation, acute blood loss anemia secondary to suspected upper GI bleed (01 Feb 2025 19:05)      INTERVAL HPI/OVERNIGHT EVENTS:  Pt was seen and examined, no acute events.      MEDICATIONS  (STANDING):  atorvastatin 20 milliGRAM(s) Oral at bedtime  dextrose 5%. 1000 milliLiter(s) (50 mL/Hr) IV Continuous <Continuous>  dextrose 5%. 1000 milliLiter(s) (100 mL/Hr) IV Continuous <Continuous>  dextrose 50% Injectable 25 Gram(s) IV Push once  dextrose 50% Injectable 12.5 Gram(s) IV Push once  dextrose 50% Injectable 25 Gram(s) IV Push once  fluticasone propionate/ salmeterol 250-50 MICROgram(s) Diskus 1 Dose(s) Inhalation two times a day  furosemide    Tablet 80 milliGRAM(s) Oral two times a day  glucagon  Injectable 1 milliGRAM(s) IntraMuscular once  glucagon  Injectable 1 milliGRAM(s) IntraMuscular once  influenza  Vaccine (HIGH DOSE) 0.5 milliLiter(s) IntraMuscular once  insulin glargine Injectable (LANTUS) 32 Unit(s) SubCutaneous at bedtime  insulin lispro (ADMELOG) corrective regimen sliding scale   SubCutaneous three times a day before meals  insulin lispro Injectable (ADMELOG) 3 Unit(s) SubCutaneous three times a day before meals  metoprolol succinate ER 25 milliGRAM(s) Oral daily  pantoprazole    Tablet 40 milliGRAM(s) Oral two times a day  rivaroxaban 15 milliGRAM(s) Oral with dinner  tamsulosin 0.4 milliGRAM(s) Oral at bedtime  tiotropium 2.5 MICROgram(s) Inhaler 2 Puff(s) Inhalation daily    MEDICATIONS  (PRN):  acetaminophen     Tablet .. 650 milliGRAM(s) Oral every 6 hours PRN Temp greater or equal to 38C (100.4F), Mild Pain (1 - 3)  albuterol/ipratropium for Nebulization.. 3 milliLiter(s) Inhalation every 6 hours PRN -for shortness of breath and/or wheezing  dextrose Oral Gel 15 Gram(s) Oral once PRN Blood Glucose LESS THAN 70 milliGRAM(s)/deciliter      Allergies  No Known Allergies        Vital Signs Last 24 Hrs  T(C): 37.1 (01 Feb 2025 17:07), Max: 37.4 (31 Jan 2025 23:35)  T(F): 98.8 (01 Feb 2025 17:07), Max: 99.4 (01 Feb 2025 10:03)  HR: 71 (01 Feb 2025 17:16) (69 - 79)  BP: 108/60 (01 Feb 2025 17:16) (101/60 - 123/73)  BP(mean): 82 (01 Feb 2025 17:07) (82 - 82)  RR: 17 (01 Feb 2025 17:07) (17 - 18)  SpO2: 96% (01 Feb 2025 17:07) (93% - 99%)    Parameters below as of 01 Feb 2025 17:07  Patient On (Oxygen Delivery Method): nasal cannula  O2 Flow (L/min): 3      PHYSICAL EXAM:  GENERAL: NAD  HEAD:  Atraumatic   EYES: PERRLA  ENMT: Mouth moist   NECK: Supple  NERVOUS SYSTEM:  Awake, alert  CHEST/LUNG: Diminished , on NC  HEART: RRR, S1, S2  ABDOMEN: Soft, non tender  EXTREMITIES: 3+ BL LE  SKIN: No rash      LABS:    02-01    136  |  90[L]  |  76[H]  ----------------------------<  211[H]  4.0   |  37[H]  |  2.96[H]    Ca    8.8      01 Feb 2025 18:20  Phos  4.7     01-31  Mg     2.1     02-01    TPro  7.1  /  Alb  2.8[L]  /  TBili  1.0  /  DBili  x   /  AST  47[H]  /  ALT  25  /  AlkPhos  288[H]  02-01      Urinalysis Basic - ( 01 Feb 2025 18:20 )    Color: x / Appearance: x / SG: x / pH: x  Gluc: 211 mg/dL / Ketone: x  / Bili: x / Urobili: x   Blood: x / Protein: x / Nitrite: x   Leuk Esterase: x / RBC: x / WBC x   Sq Epi: x / Non Sq Epi: x / Bacteria: x      CAPILLARY BLOOD GLUCOSE      POCT Blood Glucose.: 181 mg/dL (01 Feb 2025 16:54)  POCT Blood Glucose.: 223 mg/dL (01 Feb 2025 11:07)  POCT Blood Glucose.: 175 mg/dL (01 Feb 2025 07:41)  POCT Blood Glucose.: 249 mg/dL (31 Jan 2025 21:39)  POCT Blood Glucose.: 228 mg/dL (31 Jan 2025 21:23)      RADIOLOGY & ADDITIONAL TESTS:    Imaging Personally Reviewed:  [ ] YES  [ ] NO    Consultant(s) Notes Reviewed:  [ ] YES  [ ] NO    Care Discussed with Consultants/Other Providers [ ] YES  [ ] NO

## 2025-02-01 NOTE — PROGRESS NOTE ADULT - ASSESSMENT
79M with H/O CAD /CABG,  HFrEF 25%, ICD,  c/c  Afib , COPD , on home o2,  HTN/ HLD. DM admitted with sob from  anemia requiring PRBC transfusion and  from acute on chronic  systolic chf, Ef 25, and diastolic  dysfunction, mod TR, mod  PTHn, Cardiomyopathy, S/P AICD, S/P bumex drip-> S/P Lasix 80 iv-> complicated 2/2 contraction alkalosis. and persistent hypokalemia.   +Lt  brachial/  cephalic IV  phlebitis  DENISHA on CKD. stage  3. renal following   Chronic Afib, on Xarelto  Symptomatic anemia, S/p transfusion, seen by GI  DM, on lantus   Lipitor,  toprol, lantus, xarelto.    Net negative  with decrease  in pedal  edema, resume Lasix PO  On 3L NC   Obesity, BMI  35  Discussed with wife at  bedside    Pt is DNR/I  Prepare to dc to [facility].

## 2025-02-01 NOTE — PROGRESS NOTE ADULT - SUBJECTIVE AND OBJECTIVE BOX
HPI:  Arun Solares is an 79 year old male with PMHx of HTN, HLD, IDDM2, CAD (s/p CABG), pAF (on rivaroxaban), chronic combined HFpEF and HFrEF (LV EF 25-30% and grade III diastolic dysfunction on echo 8/9/24, s/p AICD placement), hx of severe aortic stenosis (s/p TAVR), chronic hypoxic respiratory failure secondary to COPD (baseline on 3-4L NC at home), CKD stage IV, and BPH who presented to the ED on 1/10/25 for complaints of shortness of breath.    History primarily obtained form wife at bedside. As per wife at bedside, patient is chronically short of breath and uses up to 4L NC continuously. However, for the past two weeks, he has been more short of breath and had increased fluid retention. States his left hand, abdomen, and leg have been very swollen. Sleeps on recliner as he is unable to lay flat. No paroxysmal nocturnal dyspnea. Reports intermittent productive cough with white/grey phlegm as well. Denies taking OTC antitussives. No recent travel or known sick contacts. Has been compliant with supplemental oxygen and diuretics. However, states he was discharged from [facility] in August 2025 with prescription of spironolactone 25 mg. Took 1-month supply and then stopped taking it as no other physician gave him another prescription for it. In addition, patient states he has had two episodes of black stools. Took ibuprofen a few days ago for hand spasms. Last took aspirin and xarelto today. States last colonoscopy was > 10 years ago and he had a few polyps removed at that time. Baseline functional status is ambulates unassisted and sometimes uses a walker. Independent with all ADLs. Lives at home with wife.     In the ED, VSS. No documented hypoxia but placed on BiPAP 15/6 on 100%. WBC 13.16K, hgb 6.5, sodium 132, BUN 61, Cr 2.71, blood glucose 256. Troponin WNL. Pro-BNP 4773. ABG 7.51 / 44 / 415 / 35 on BiPAP 15/6 on 100%. CXR personally reviewed; cardiomegaly, vascular congestion, blunting of L. costophrenic angle to suggest pleural effusion, appears unchanged from CXR 8/8/24. Received furosemide 40 mg IV. Evaluated by nephrology who recommends furosemide 40 mg IV daily given takes 40 mg PO BID at home. Requested ER physician to decrease FiO2 given O2 on ABG > 400.  (10 Tay 2025 18:09)  Pt remains SOB with extremity edema. No active bleeding. No GI intervention planned at this time.    REVIEW OF SYSTEMS: SOB      General:	    Skin/Breast:  	  Ophthalmologic:  	  ENMT:	    Respiratory and Thorax:  	  Cardiovascular:	    Gastrointestinal:	    Genitourinary:	    Musculoskeletal:	    Neurological:	    Psychiatric:	    Hematology/Lymphatics:	    Endocrine:	    Allergic/Immunologic:	    MEDICATIONS  (STANDING):  atorvastatin 20 milliGRAM(s) Oral at bedtime  dextrose 5%. 1000 milliLiter(s) (50 mL/Hr) IV Continuous <Continuous>  dextrose 5%. 1000 milliLiter(s) (100 mL/Hr) IV Continuous <Continuous>  dextrose 50% Injectable 25 Gram(s) IV Push once  dextrose 50% Injectable 12.5 Gram(s) IV Push once  dextrose 50% Injectable 25 Gram(s) IV Push once  fluticasone propionate/ salmeterol 250-50 MICROgram(s) Diskus 1 Dose(s) Inhalation two times a day  furosemide    Tablet 80 milliGRAM(s) Oral two times a day  glucagon  Injectable 1 milliGRAM(s) IntraMuscular once  glucagon  Injectable 1 milliGRAM(s) IntraMuscular once  influenza  Vaccine (HIGH DOSE) 0.5 milliLiter(s) IntraMuscular once  insulin glargine Injectable (LANTUS) 32 Unit(s) SubCutaneous at bedtime  insulin lispro (ADMELOG) corrective regimen sliding scale   SubCutaneous three times a day before meals  insulin lispro Injectable (ADMELOG) 3 Unit(s) SubCutaneous three times a day before meals  metoprolol succinate ER 25 milliGRAM(s) Oral daily  pantoprazole    Tablet 40 milliGRAM(s) Oral two times a day  potassium chloride   Powder 40 milliEquivalent(s) Oral every 4 hours  rivaroxaban 15 milliGRAM(s) Oral with dinner  tamsulosin 0.4 milliGRAM(s) Oral at bedtime  tiotropium 2.5 MICROgram(s) Inhaler 2 Puff(s) Inhalation daily    MEDICATIONS  (PRN):  acetaminophen     Tablet .. 650 milliGRAM(s) Oral every 6 hours PRN Temp greater or equal to 38C (100.4F), Mild Pain (1 - 3)  albuterol/ipratropium for Nebulization.. 3 milliLiter(s) Inhalation every 6 hours PRN -for shortness of breath and/or wheezing  dextrose Oral Gel 15 Gram(s) Oral once PRN Blood Glucose LESS THAN 70 milliGRAM(s)/deciliter      Vital Signs Last 24 Hrs  T(C): 37.4 (01 Feb 2025 10:03), Max: 37.4 (31 Jan 2025 23:35)  T(F): 99.4 (01 Feb 2025 10:03), Max: 99.4 (01 Feb 2025 10:03)  HR: 71 (01 Feb 2025 10:03) (69 - 79)  BP: 101/60 (01 Feb 2025 10:03) (101/60 - 123/73)  BP(mean): --  RR: 18 (01 Feb 2025 10:03) (18 - 18)  SpO2: 93% (01 Feb 2025 10:03) (93% - 99%)    Parameters below as of 01 Feb 2025 10:03  Patient On (Oxygen Delivery Method): nasal cannula  O2 Flow (L/min): 3      PHYSICAL EXAM:      Constitutional: in no distress    Eyes: no jaundice    ENMT:    Neck: supple    Breasts:    Back:    Respiratory: normal    Cardiovascular: normal    Gastrointestinal: normal    Genitourinary:    Rectal:    Extremities:    Vascular:    Neurological:    Skin:    Lymph Nodes:    Musculoskeletal:    Psychiatric:            HEALTH ISSUES - PROBLEM Dx:  Acute on chronic combined systolic and diastolic congestive heart failure    Anemia due to acute blood loss    Upper GI bleed    HTN (hypertension)    HLD (hyperlipidemia)    Type 2 diabetes mellitus with hyperglycemia, with long-term current use of insulin    Unspecified atrial fibrillation    Chronic hypoxic respiratory failure, on home oxygen therapy    Stage 4 chronic kidney disease    CAD (coronary artery disease)    COPD without exacerbation    Heart failure    Weakness    Encounter for palliative care              Assesment & Plan: CHF/ occult GI bleeding. Monitor H/H. No planned GI intervention at this time

## 2025-02-01 NOTE — PROGRESS NOTE ADULT - SUBJECTIVE AND OBJECTIVE BOX
No distress    Vital Signs Last 24 Hrs  T(C): 37.1 (02-01-25 @ 17:07), Max: 37.4 (01-31-25 @ 23:35)  T(F): 98.8 (02-01-25 @ 17:07), Max: 99.4 (02-01-25 @ 10:03)  HR: 71 (02-01-25 @ 17:16) (69 - 79)  BP: 108/60 (02-01-25 @ 17:16) (101/60 - 123/73)  BP(mean): 82 (02-01-25 @ 17:07) (82 - 82)  RR: 17 (02-01-25 @ 17:07) (17 - 18)  SpO2: 96% (02-01-25 @ 17:07) (93% - 99%)    I&O's Detail    31 Jan 2025 07:01  -  01 Feb 2025 07:00  --------------------------------------------------------  IN:    Furosemide: 105 mL    Oral Fluid: 480 mL  Total IN: 585 mL    OUT:    Voided (mL): 640 mL  Total OUT: 640 mL    Lungs b/l air entry  Heart S1S2  Abd soft ND  Extremities edema                     01 Feb 2025 08:19    135    |  91     |  75     ----------------------------<  153    3.1     |  38     |  3.01     Ca    9.4        01 Feb 2025 08:19  Phos  4.7       31 Jan 2025 07:10  Mg     2.1       31 Jan 2025 07:10    acetaminophen     Tablet .. 650 milliGRAM(s) Oral every 6 hours PRN  albuterol/ipratropium for Nebulization.. 3 milliLiter(s) Inhalation every 6 hours PRN  atorvastatin 20 milliGRAM(s) Oral at bedtime  dextrose 5%. 1000 milliLiter(s) IV Continuous <Continuous>  dextrose 5%. 1000 milliLiter(s) IV Continuous <Continuous>  dextrose 50% Injectable 25 Gram(s) IV Push once  dextrose 50% Injectable 12.5 Gram(s) IV Push once  dextrose 50% Injectable 25 Gram(s) IV Push once  dextrose Oral Gel 15 Gram(s) Oral once PRN  fluticasone propionate/ salmeterol 250-50 MICROgram(s) Diskus 1 Dose(s) Inhalation two times a day  furosemide    Tablet 80 milliGRAM(s) Oral two times a day  glucagon  Injectable 1 milliGRAM(s) IntraMuscular once  glucagon  Injectable 1 milliGRAM(s) IntraMuscular once  influenza  Vaccine (HIGH DOSE) 0.5 milliLiter(s) IntraMuscular once  insulin glargine Injectable (LANTUS) 32 Unit(s) SubCutaneous at bedtime  insulin lispro (ADMELOG) corrective regimen sliding scale   SubCutaneous three times a day before meals  insulin lispro Injectable (ADMELOG) 3 Unit(s) SubCutaneous three times a day before meals  metoprolol succinate ER 25 milliGRAM(s) Oral daily  pantoprazole    Tablet 40 milliGRAM(s) Oral two times a day  rivaroxaban 15 milliGRAM(s) Oral with dinner  tamsulosin 0.4 milliGRAM(s) Oral at bedtime  tiotropium 2.5 MICROgram(s) Inhaler 2 Puff(s) Inhalation daily    Assessment/Plan:    CM, EF 25 - 30%, mod TR  Fluid overload   DENISHA/CKD 3  Contraction alkalosis   Overall stable renal indices  Continue Lasix  Suppl K  F/u BMP, Mg, UO  Avoid nephrotoxins as able    [phone]

## 2025-02-01 NOTE — PROGRESS NOTE ADULT - SUBJECTIVE AND OBJECTIVE BOX
CARDIOLOGY PROGRESS NOTE  Patient is a 79y old  Male who presents with a chief complaint of worsening sob and edema   PAST MEDICAL & SURGICAL HISTORY: HTN (hypertension)  CAD  COPD (chronic obstructive pulmonary disease)  HLD (hyperlipidemia)  Insulin dependent type 2 diabetes mellitus  Chronic hypoxic respiratory failure, on home oxygen therapy  Stage 4 chronic kidney disease  Chronic combined systolic and diastolic heart failure  Unspecified atrial fibrillation  S/P CABG x 3 in 2003  s/p TAVR  S/P hernia repair hernia [id number]  S/P implantation of automatic cardioverter/defibrillator (AICD)  INTERVAL HISTORY: still with mild dyspnea, hypervolemia persists  	 MEDICATIONS  (STANDING): atorvastatin 20 milliGRAM(s) Oral at bedtime fluticasone propionate/ salmeterol 250-50 MICROgram(s) Diskus 1 Dose(s) Inhalation two times a day furosemide    Tablet 80 milliGRAM(s) Oral two times a day insulin lispro Injectable (ADMELOG) 3 Unit(s) SubCutaneous three times a day before meals metoprolol succinate ER 25 milliGRAM(s) Oral daily pantoprazole    Tablet 40 milliGRAM(s) Oral two times a day potassium chloride   Powder 40 milliEquivalent(s) Oral every 4 hours rivaroxaban 15 milliGRAM(s) Oral with dinner tamsulosin 0.4 milliGRAM(s) Oral at bedtime tiotropium 2.5 MICROgram(s) Inhaler 2 Puff(s) Inhalation daily   PHYSICAL EXAMINATION: ----------------------------- T(C): 37.4 (02-01-25 @ 10:03), Max: 37.4 (01-31-25 @ 23:35) HR: 71 (02-01-25 @ 10:03) (69 - 79) BP: 101/60 (02-01-25 @ 10:03) (101/60 - 123/73) RR: 18 (02-01-25 @ 10:03) (18 - 18) SpO2: 93% (02-01-25 @ 10:03) (93% - 99%) Wt(kg): --  01-31 @ 07:01  -  02-01 @ 07:00 -------------------------------------------------------- IN:   Furosemide: 105 mL   Oral Fluid: 480 mL Total IN: 585 mL  OUT:   Voided (mL): 640 mL Total OUT: 640 mL  Total NET: -55 mL   02-01 @ 07:01  -  02-01 @ 13:58 -------------------------------------------------------- IN:   Oral Fluid: 460 mL Total IN: 460 mL  OUT: Total OUT: 0 mL  Total NET: 460 mL  Constitutional: well developed, normal appearance, well groomed, well nourished, no deformities and no acute distress.  Eyes: the conjunctiva exhibited no abnormalities and the eyelids demonstrated no xanthelasmas.  HEENT: normal oral mucosa, no oral pallor and no oral cyanosis.  Neck: normal jugular venous A waves present, normal jugular venous V waves present and no jugular venous nava A waves.  Pulmonary: no respiratory distress, normal respiratory rhythm and effort, no accessory muscle use and decreased a/e to auscultation bilaterally.  Cardiovascular: heart rate and rhythm were normal, normal S1 and S2 and no murmur, gallop, rub, heave or thrill are present. +anasarca Abdomen: distended, non-tender, no hepato-splenomegaly and no abdominal mass palpated.  Musculoskeletal: the gait could not be assessed..  Extremities: no clubbing of the fingernails, no localized cyanosis, no petechial hemorrhages and no ischemic changes.  Skin: normal skin color and pigmentation, no rash, no venous stasis, no skin lesions, no skin ulcer and no xanthoma was observed.  Psychiatric: oriented to person, place, and time, the affect was normal, the mood was normal and not feeling anxious.   LABS:  -------- 02-01  135  |  91[L]  |  75[H] ----------------------------<  153[H] 3.1[L]   |  38[H]  |  3.01[H]  Ca    9.4      01 Feb 2025 08:19 Phos  4.7     01-31 Mg     2.1     01-31      Paced on telemetry.   RADIOLOGY: < from: Xray Chest 1 View-PORTABLE IMMEDIATE (Xray Chest 1 View-PORTABLE IMMEDIATE .) (01.25.25 @ 10:52) > IMPRESSION: There is persistent cardiomegaly. Poststernotomy, CABG and  TAVR. Mild pulmonary venous congestive changes with persistent hazy  opacity over the left lower lung zone. Left-sided pleural effusion, left  lower lung zone pneumonia and/or atelectasis cannot be excluded.  < end of copied text >  DIAGNOSTIC TESTING:  [x ] Echocardiogram: < from: TTE Echo Complete w/ Contrast w/ Doppler (08.08.24 @ 13:27) >   1. Left ventricular cavity is normal in size. Left ventricular systolic  function is severely decreased with an ejection fraction visually  estimated at 25 to 30 %. Global left ventricular hypokinesis.  2. There is severe (grade 3) left ventricular diastolic dysfunction,  with elevated left ventricular filling pressure.  3. The right ventricle is not well visualized.  4. Device lead is visualized in the right heart.  5. No significant valvular disease.  6. No pericardial effusion seen.  7. Estimated pulmonary artery systolic pressure is 53 mmHg, consistent  with moderate pulmonary hypertension.  8. Left and moderate left pleural effusion noted.  9. Mild pulmonic regurgitation. 10. Moderate tricuspid regurgitation.  < end of copied text >   < from: NM Pharm Stress Test, Dual Isotope (03.17.21 @ 12:40) > 1. There was scintigraphic evidence for a myocardial infarct in the RCA territory with no significant ischemia.  2. There is severely abnormal left ventricular contractility, globally diminished calculated ejection fraction and no wall motion abnormlaities. Overall post stress ejection fraction was 21%  < end of copied text >  pro-BNP: Pro-Brain Natriuretic Peptide: 7384 pg/mL (01.28.25 @ 06:06)  Thyroid Stimulating Hormone, Serum: 2.850 uIU/mL (01.22.25 @ 06:31)

## 2025-02-01 NOTE — PROGRESS NOTE ADULT - ASSESSMENT
79M with CAD/CABG, s/p TAVR, HFrEF 25%, s/p ICD, afib, copd on home o2, htn, hld, dm, p/w adhf, acute anemia requiring PRBC transfusion     Still volume overloaded    Had been on bumex drip which was d/c'd 2/2 contraction alkalosis, s/p acetazolamide dosing, bicarb improved, 1/27 restarted on diuretics-> Lasix 80 IV q 8h -> Lasix drip at 30mg/hr, metolazone 10/d ->now d/c'd as hypokalemia persists, renal following, monitor renal function, electrolytes, bicarb.  I find it highly unlikely that his last 10 weights have been exactly the same(!)  Still needs potassium repletion.  On Toprol 25/d, add further GDMT for cardiomyopathy as BP and renal function allows  +Lt UE DVT - on AC, cont on Xarelto, home med  cont with COPD management   increase activity as tolerated   DNR/DNI 79M with CAD/CABG, s/p TAVR, HFrEF 25%, s/p ICD, afib, copd on home o2, htn, hld, dm, p/w adhf, acute anemia requiring PRBC transfusion     Remains volume overloaded on current Rx    Had been on bumex drip which was d/c'd 2/2 contraction alkalosis, s/p acetazolamide dosing, bicarb improved, 1/27 restarted on diuretics-> Lasix 80 IV q 8h -> Lasix drip at 30mg/hr, metolazone 10/d ->now d/c'd as hypokalemia persists, renal following, monitor renal function, electrolytes, bicarb.  I find it highly unlikely that his last 10 weights have been exactly the same(!) - witnessed weight today down to 108.4 kg  Still needs potassium repletion.  On Toprol 25/d, add further GDMT for cardiomyopathy as BP and renal function allows. Add Fineronone? - d/w renal.  +Lt UE DVT - on AC, cont on Xarelto, home med  cont with COPD management   increase activity as tolerated   DNR/DNI

## 2025-02-01 NOTE — CHART NOTE - NSCHARTNOTESELECT_GEN_ALL_CORE
Nutrition Services
Removal of IJ Left/Event Note
Event Note
Event Note
Nutrition Services
Off Service Note

## 2025-02-01 NOTE — CHART NOTE - NSCHARTNOTEFT_GEN_A_CORE
pt with hfref 25 % aicd in place  noted with 14 beats v armida  pt asymptomatic   check k+ mag +   continue on telemetry

## 2025-02-02 LAB
ANION GAP SERPL CALC-SCNC: 8 MMOL/L — SIGNIFICANT CHANGE UP (ref 5–17)
BUN SERPL-MCNC: 76 MG/DL — HIGH (ref 7–23)
CALCIUM SERPL-MCNC: 9.3 MG/DL — SIGNIFICANT CHANGE UP (ref 8.5–10.1)
CHLORIDE SERPL-SCNC: 90 MMOL/L — LOW (ref 96–108)
CO2 SERPL-SCNC: 39 MMOL/L — HIGH (ref 22–31)
CREAT SERPL-MCNC: 2.78 MG/DL — HIGH (ref 0.5–1.3)
EGFR: 22 ML/MIN/1.73M2 — LOW
GLUCOSE BLDC GLUCOMTR-MCNC: 186 MG/DL — HIGH (ref 70–99)
GLUCOSE BLDC GLUCOMTR-MCNC: 205 MG/DL — HIGH (ref 70–99)
GLUCOSE BLDC GLUCOMTR-MCNC: 232 MG/DL — HIGH (ref 70–99)
GLUCOSE BLDC GLUCOMTR-MCNC: 247 MG/DL — HIGH (ref 70–99)
GLUCOSE SERPL-MCNC: 204 MG/DL — HIGH (ref 70–99)
MAGNESIUM SERPL-MCNC: 2.1 MG/DL — SIGNIFICANT CHANGE UP (ref 1.6–2.6)
POTASSIUM SERPL-MCNC: 3.1 MMOL/L — LOW (ref 3.5–5.3)
POTASSIUM SERPL-SCNC: 3.1 MMOL/L — LOW (ref 3.5–5.3)
SODIUM SERPL-SCNC: 137 MMOL/L — SIGNIFICANT CHANGE UP (ref 135–145)

## 2025-02-02 PROCEDURE — 99233 SBSQ HOSP IP/OBS HIGH 50: CPT

## 2025-02-02 PROCEDURE — 99232 SBSQ HOSP IP/OBS MODERATE 35: CPT

## 2025-02-02 RX ORDER — POTASSIUM CHLORIDE 750 MG/1
40 TABLET, EXTENDED RELEASE ORAL ONCE
Refills: 0 | Status: COMPLETED | OUTPATIENT
Start: 2025-02-02 | End: 2025-02-02

## 2025-02-02 RX ADMIN — Medication 80 MILLIGRAM(S): at 05:47

## 2025-02-02 RX ADMIN — PANTOPRAZOLE 40 MILLIGRAM(S): 20 TABLET, DELAYED RELEASE ORAL at 05:47

## 2025-02-02 RX ADMIN — POTASSIUM CHLORIDE 40 MILLIEQUIVALENT(S): 750 TABLET, EXTENDED RELEASE ORAL at 09:08

## 2025-02-02 RX ADMIN — Medication 3 UNIT(S): at 08:44

## 2025-02-02 RX ADMIN — Medication 80 MILLIGRAM(S): at 14:13

## 2025-02-02 RX ADMIN — Medication 1: at 11:22

## 2025-02-02 RX ADMIN — INSULIN GLARGINE-YFGN 32 UNIT(S): 100 INJECTION, SOLUTION SUBCUTANEOUS at 22:48

## 2025-02-02 RX ADMIN — ATORVASTATIN CALCIUM 20 MILLIGRAM(S): 80 TABLET, FILM COATED ORAL at 22:48

## 2025-02-02 RX ADMIN — Medication 3 UNIT(S): at 17:31

## 2025-02-02 RX ADMIN — RIVAROXABAN 15 MILLIGRAM(S): 20 TABLET, FILM COATED ORAL at 17:31

## 2025-02-02 RX ADMIN — POTASSIUM CHLORIDE 40 MILLIEQUIVALENT(S): 750 TABLET, EXTENDED RELEASE ORAL at 14:13

## 2025-02-02 RX ADMIN — FLUTICASONE PROPIONATE AND SALMETEROL 1 DOSE(S): 113; 14 POWDER, METERED RESPIRATORY (INHALATION) at 05:47

## 2025-02-02 RX ADMIN — Medication 25 MILLIGRAM(S): at 05:47

## 2025-02-02 RX ADMIN — FLUTICASONE PROPIONATE AND SALMETEROL 1 DOSE(S): 113; 14 POWDER, METERED RESPIRATORY (INHALATION) at 17:32

## 2025-02-02 RX ADMIN — TAMSULOSIN HYDROCHLORIDE 0.4 MILLIGRAM(S): 0.4 CAPSULE ORAL at 22:48

## 2025-02-02 RX ADMIN — PANTOPRAZOLE 40 MILLIGRAM(S): 20 TABLET, DELAYED RELEASE ORAL at 17:34

## 2025-02-02 RX ADMIN — Medication 2: at 08:42

## 2025-02-02 RX ADMIN — TIOTROPIUM BROMIDE MONOHYDRATE 2 PUFF(S): 18 CAPSULE ORAL; RESPIRATORY (INHALATION) at 11:21

## 2025-02-02 RX ADMIN — Medication 3 UNIT(S): at 11:22

## 2025-02-02 RX ADMIN — Medication 2: at 17:30

## 2025-02-02 NOTE — PROGRESS NOTE ADULT - SUBJECTIVE AND OBJECTIVE BOX
HPI:  Arun Solares is an 79 year old male with PMHx of HTN, HLD, IDDM2, CAD (s/p CABG), pAF (on rivaroxaban), chronic combined HFpEF and HFrEF (LV EF 25-30% and grade III diastolic dysfunction on echo 8/9/24, s/p AICD placement), hx of severe aortic stenosis (s/p TAVR), chronic hypoxic respiratory failure secondary to COPD (baseline on 3-4L NC at home), CKD stage IV, and BPH who presented to the ED on 1/10/25 for complaints of shortness of breath.    History primarily obtained form wife at bedside. As per wife at bedside, patient is chronically short of breath and uses up to 4L NC continuously. However, for the past two weeks, he has been more short of breath and had increased fluid retention. States his left hand, abdomen, and leg have been very swollen. Sleeps on recliner as he is unable to lay flat. No paroxysmal nocturnal dyspnea. Reports intermittent productive cough with white/grey phlegm as well. Denies taking OTC antitussives. No recent travel or known sick contacts. Has been compliant with supplemental oxygen and diuretics. However, states he was discharged from Health system in August 2025 with prescription of spironolactone 25 mg. Took 1-month supply and then stopped taking it as no other physician gave him another prescription for it. In addition, patient states he has had two episodes of black stools. Took ibuprofen a few days ago for hand spasms. Last took aspirin and xarelto today. States last colonoscopy was > 10 years ago and he had a few polyps removed at that time. Baseline functional status is ambulates unassisted and sometimes uses a walker. Independent with all ADLs. Lives at home with wife.     In the ED, VSS. No documented hypoxia but placed on BiPAP 15/6 on 100%. WBC 13.16K, hgb 6.5, sodium 132, BUN 61, Cr 2.71, blood glucose 256. Troponin WNL. Pro-BNP 4773. ABG 7.51 / 44 / 415 / 35 on BiPAP 15/6 on 100%. CXR personally reviewed; cardiomegaly, vascular congestion, blunting of L. costophrenic angle to suggest pleural effusion, appears unchanged from CXR 8/8/24. Received furosemide 40 mg IV. Evaluated by nephrology who recommends furosemide 40 mg IV daily given takes 40 mg PO BID at home. Requested ER physician to decrease FiO2 given O2 on ABG > 400.  (10 Tay 2025 18:09)    Pt awake and alert. Minimally communicative. Still SOB. Cardiology evaluation noted and BNP remains markedly elevated. No active GI bleeding.   REVIEW OF SYSTEMS unremarkable      General:	    Skin/Breast:  	  Ophthalmologic:  	  ENMT:	    Respiratory and Thorax:  	  Cardiovascular:	    Gastrointestinal:	    Genitourinary:	    Musculoskeletal:	    Neurological:	    Psychiatric:	    Hematology/Lymphatics:	    Endocrine:	    Allergic/Immunologic:	    MEDICATIONS  (STANDING):  atorvastatin 20 milliGRAM(s) Oral at bedtime  dextrose 5%. 1000 milliLiter(s) (50 mL/Hr) IV Continuous <Continuous>  dextrose 5%. 1000 milliLiter(s) (100 mL/Hr) IV Continuous <Continuous>  dextrose 50% Injectable 25 Gram(s) IV Push once  dextrose 50% Injectable 12.5 Gram(s) IV Push once  dextrose 50% Injectable 25 Gram(s) IV Push once  fluticasone propionate/ salmeterol 250-50 MICROgram(s) Diskus 1 Dose(s) Inhalation two times a day  furosemide    Tablet 80 milliGRAM(s) Oral two times a day  glucagon  Injectable 1 milliGRAM(s) IntraMuscular once  glucagon  Injectable 1 milliGRAM(s) IntraMuscular once  influenza  Vaccine (HIGH DOSE) 0.5 milliLiter(s) IntraMuscular once  insulin glargine Injectable (LANTUS) 32 Unit(s) SubCutaneous at bedtime  insulin lispro (ADMELOG) corrective regimen sliding scale   SubCutaneous three times a day before meals  insulin lispro Injectable (ADMELOG) 3 Unit(s) SubCutaneous three times a day before meals  metoprolol succinate ER 25 milliGRAM(s) Oral daily  pantoprazole    Tablet 40 milliGRAM(s) Oral two times a day  potassium chloride    Tablet ER 40 milliEquivalent(s) Oral daily  rivaroxaban 15 milliGRAM(s) Oral with dinner  tamsulosin 0.4 milliGRAM(s) Oral at bedtime  tiotropium 2.5 MICROgram(s) Inhaler 2 Puff(s) Inhalation daily    MEDICATIONS  (PRN):  acetaminophen     Tablet .. 650 milliGRAM(s) Oral every 6 hours PRN Temp greater or equal to 38C (100.4F), Mild Pain (1 - 3)  albuterol/ipratropium for Nebulization.. 3 milliLiter(s) Inhalation every 6 hours PRN -for shortness of breath and/or wheezing  dextrose Oral Gel 15 Gram(s) Oral once PRN Blood Glucose LESS THAN 70 milliGRAM(s)/deciliter      Vital Signs Last 24 Hrs  T(C): 36.8 (02 Feb 2025 10:16), Max: 37.1 (01 Feb 2025 17:07)  T(F): 98.3 (02 Feb 2025 10:16), Max: 98.8 (01 Feb 2025 17:07)  HR: 70 (02 Feb 2025 10:16) (70 - 85)  BP: 115/68 (02 Feb 2025 10:16) (106/64 - 119/64)  BP(mean): 82 (01 Feb 2025 17:07) (82 - 82)  RR: 18 (02 Feb 2025 10:16) (17 - 18)  SpO2: 99% (02 Feb 2025 10:16) (93% - 99%)    Parameters below as of 02 Feb 2025 07:55  Patient On (Oxygen Delivery Method): nasal cannula        PHYSICAL EXAM:      Constitutional: in no distress    Eyes: normal    ENMT:    Neck: normal    Breasts:    Back:    Respiratory: diminished breath sounds bilaterally    Cardiovascular: normal    Gastrointestinal: normal    Genitourinary:    Rectal:    Extremities:    Vascular:    Neurological:    Skin:    Lymph Nodes:    Musculoskeletal:    Psychiatric:            HEALTH ISSUES - PROBLEM Dx:  Acute on chronic combined systolic and diastolic congestive heart failure    Anemia due to acute blood loss    Upper GI bleed    HTN (hypertension)    HLD (hyperlipidemia)    Type 2 diabetes mellitus with hyperglycemia, with long-term current use of insulin    Unspecified atrial fibrillation    Chronic hypoxic respiratory failure, on home oxygen therapy    Stage 4 chronic kidney disease    CAD (coronary artery disease)    COPD without exacerbation    Heart failure    Weakness    Encounter for palliative care              Assesment & Plan: GI bleeding. Stable. Monitor H/H. Not stable for GI intervention.

## 2025-02-02 NOTE — PROGRESS NOTE ADULT - ASSESSMENT
79M with CAD/CABG, s/p TAVR, HFrEF 25%, s/p ICD, afib, copd on home o2, htn, hld, dm, p/w adhf, acute anemia requiring PRBC transfusion     Remains volume overloaded on current Rx, slowly appears improving    Had been on bumex drip which was d/c'd 2/2 contraction alkalosis, s/p acetazolamide dosing, bicarb improved, 1/27 restarted on diuretics-> Lasix 80 IV q 8h -> Lasix drip at 30mg/hr, metolazone 10/d ->now d/c'd as hypokalemia persists, renal following, monitor renal function, electrolytes, bicarb.  Still needs potassium repletion.  On Toprol 25/d, add further GDMT for cardiomyopathy as BP and renal function allows. Add Fineronone? - d/w renal.  +Lt UE DVT - on AC, cont on Xarelto, home med  cont with COPD management   increase activity as tolerated   DNR/DNI 79M with CAD/CABG, s/p TAVR, HFrEF 25%, s/p ICD, afib, copd on home o2, htn, hld, dm, p/w adhf, acute anemia requiring PRBC transfusion     Remains volume overloaded on current Rx, slowly appears improving    Had been on bumex drip which was d/c'd 2/2 contraction alkalosis, s/p acetazolamide dosing, bicarb improved, 1/27 restarted on diuretics-> Lasix 80 IV q 8h -> Lasix drip at 30mg/hr, metolazone 10/d ->d/c'd as hypokalemia persists.  Back on high dose oral loop diuretics, renal following, monitor renal function, electrolytes, bicarb.  Still needs (more) potassium repletion.  On Toprol 25/d, add further GDMT for cardiomyopathy as BP and renal function allows. Add Fineronone? - d/w renal.  +Lt UE DVT - on AC, cont on Xarelto, home med  cont with COPD management   increase activity as tolerated   DNR/DNI

## 2025-02-02 NOTE — PROGRESS NOTE ADULT - SUBJECTIVE AND OBJECTIVE BOX
No distress    Vital Signs Last 24 Hrs  T(C): 37.1 (02-02-25 @ 16:50), Max: 37.1 (02-02-25 @ 16:50)  T(F): 98.8 (02-02-25 @ 16:50), Max: 98.8 (02-02-25 @ 16:50)  HR: 74 (02-02-25 @ 21:14) (70 - 85)  BP: 120/72 (02-02-25 @ 16:50) (106/64 - 120/72)  BP(mean): 88 (02-02-25 @ 16:50) (88 - 88)  RR: 19 (02-02-25 @ 16:50) (18 - 19)  SpO2: 97% (02-02-25 @ 21:14) (93% - 99%)    I&O's Detail    01 Feb 2025 07:01  -  02 Feb 2025 07:00  --------------------------------------------------------  IN:    Oral Fluid: 700 mL  Total IN: 700 mL    OUT:    Voided (mL): 300 mL  Total OUT: 300 mL    02 Feb 2025 07:01  -  02 Feb 2025 22:46  --------------------------------------------------------  IN:    Oral Fluid: 220 mL  Total IN: 220 mL    OUT:    Voided (mL): 400 mL  Total OUT: 400 mL    Lungs b/l air entry  Heart S1S2  Abd soft ND  Extremities edema                     02 Feb 2025 05:32    137    |  90     |  76     ----------------------------<  204    3.1     |  39     |  2.78     Ca    9.3        02 Feb 2025 05:32  Mg     2.1       02 Feb 2025 05:32    TPro  7.1    /  Alb  2.8    /  TBili  1.0    /  DBili  x      /  AST  47     /  ALT  25     /  AlkPhos  288    01 Feb 2025 18:20    LIVER FUNCTIONS - ( 01 Feb 2025 18:20 )  Alb: 2.8 g/dL / Pro: 7.1 gm/dL / ALK PHOS: 288 U/L / ALT: 25 U/L / AST: 47 U/L / GGT: x           acetaminophen     Tablet .. 650 milliGRAM(s) Oral every 6 hours PRN  albuterol/ipratropium for Nebulization.. 3 milliLiter(s) Inhalation every 6 hours PRN  atorvastatin 20 milliGRAM(s) Oral at bedtime  dextrose 5%. 1000 milliLiter(s) IV Continuous <Continuous>  dextrose 5%. 1000 milliLiter(s) IV Continuous <Continuous>  dextrose 50% Injectable 25 Gram(s) IV Push once  dextrose 50% Injectable 12.5 Gram(s) IV Push once  dextrose 50% Injectable 25 Gram(s) IV Push once  dextrose Oral Gel 15 Gram(s) Oral once PRN  fluticasone propionate/ salmeterol 250-50 MICROgram(s) Diskus 1 Dose(s) Inhalation two times a day  furosemide    Tablet 80 milliGRAM(s) Oral two times a day  glucagon  Injectable 1 milliGRAM(s) IntraMuscular once  glucagon  Injectable 1 milliGRAM(s) IntraMuscular once  influenza  Vaccine (HIGH DOSE) 0.5 milliLiter(s) IntraMuscular once  insulin glargine Injectable (LANTUS) 32 Unit(s) SubCutaneous at bedtime  insulin lispro (ADMELOG) corrective regimen sliding scale   SubCutaneous three times a day before meals  insulin lispro Injectable (ADMELOG) 3 Unit(s) SubCutaneous three times a day before meals  metoprolol succinate ER 25 milliGRAM(s) Oral daily  pantoprazole    Tablet 40 milliGRAM(s) Oral two times a day  potassium chloride    Tablet ER 40 milliEquivalent(s) Oral daily  rivaroxaban 15 milliGRAM(s) Oral with dinner  tamsulosin 0.4 milliGRAM(s) Oral at bedtime  tiotropium 2.5 MICROgram(s) Inhaler 2 Puff(s) Inhalation daily    Assessment/Plan:    CM, EF 25 - 30%, mod TR  Fluid overload   DENISHA/CKD 3  Contraction alkalosis   Overall stable renal indices  Continue diuresis  Suppl K  F/u BMP, Mg, UO  Avoid nephrotoxins as able    [phone]

## 2025-02-02 NOTE — PROGRESS NOTE ADULT - ASSESSMENT
79M with H/O CAD /CABG,  HFrEF 25%, ICD,  c/c  Afib , COPD , on home o2,  HTN/ HLD. DM admitted with sob from  anemia requiring PRBC transfusion and  from acute on chronic  systolic chf, Ef 25, and diastolic  dysfunction, mod TR, mod  PTHn, Cardiomyopathy, S/P AICD, S/P bumex drip-> S/P Lasix 80 iv-> complicated 2/2 contraction alkalosis. and persistent hypokalemia.   +Lt  brachial/  cephalic IV  phlebitis  DENISHA on CKD. stage  3. renal following   Chronic Afib, on Xarelto  Symptomatic anemia, S/p transfusion, seen by GI  DM, on lantus   Lipitor,  toprol, lantus, xarelto.    Net negative  with decrease  in pedal  edema, resume Lasix PO  On 3L NC   Obesity, BMI  35  Discussed with wife at  bedside    Pt is DNR/I  Prepare to dc to [facility].

## 2025-02-02 NOTE — PROGRESS NOTE ADULT - SUBJECTIVE AND OBJECTIVE BOX
Patient is a 79y old  Male who presents with a chief complaint of Acute on chronic combined HFpEF and HFrEF exacerbation, acute blood loss anemia secondary to suspected upper GI bleed (02 Feb 2025 12:11)      INTERVAL HPI/OVERNIGHT EVENTS:  Pt was seen and examined, no acute events.      MEDICATIONS  (STANDING):  atorvastatin 20 milliGRAM(s) Oral at bedtime  dextrose 5%. 1000 milliLiter(s) (50 mL/Hr) IV Continuous <Continuous>  dextrose 5%. 1000 milliLiter(s) (100 mL/Hr) IV Continuous <Continuous>  dextrose 50% Injectable 25 Gram(s) IV Push once  dextrose 50% Injectable 12.5 Gram(s) IV Push once  dextrose 50% Injectable 25 Gram(s) IV Push once  fluticasone propionate/ salmeterol 250-50 MICROgram(s) Diskus 1 Dose(s) Inhalation two times a day  furosemide    Tablet 80 milliGRAM(s) Oral two times a day  glucagon  Injectable 1 milliGRAM(s) IntraMuscular once  glucagon  Injectable 1 milliGRAM(s) IntraMuscular once  influenza  Vaccine (HIGH DOSE) 0.5 milliLiter(s) IntraMuscular once  insulin glargine Injectable (LANTUS) 32 Unit(s) SubCutaneous at bedtime  insulin lispro (ADMELOG) corrective regimen sliding scale   SubCutaneous three times a day before meals  insulin lispro Injectable (ADMELOG) 3 Unit(s) SubCutaneous three times a day before meals  metoprolol succinate ER 25 milliGRAM(s) Oral daily  pantoprazole    Tablet 40 milliGRAM(s) Oral two times a day  potassium chloride    Tablet ER 40 milliEquivalent(s) Oral daily  rivaroxaban 15 milliGRAM(s) Oral with dinner  tamsulosin 0.4 milliGRAM(s) Oral at bedtime  tiotropium 2.5 MICROgram(s) Inhaler 2 Puff(s) Inhalation daily    MEDICATIONS  (PRN):  acetaminophen     Tablet .. 650 milliGRAM(s) Oral every 6 hours PRN Temp greater or equal to 38C (100.4F), Mild Pain (1 - 3)  albuterol/ipratropium for Nebulization.. 3 milliLiter(s) Inhalation every 6 hours PRN -for shortness of breath and/or wheezing  dextrose Oral Gel 15 Gram(s) Oral once PRN Blood Glucose LESS THAN 70 milliGRAM(s)/deciliter      Allergies    No Known Allergies    Intolerances          Vital Signs Last 24 Hrs  T(C): 37.1 (02 Feb 2025 16:50), Max: 37.1 (02 Feb 2025 16:50)  T(F): 98.8 (02 Feb 2025 16:50), Max: 98.8 (02 Feb 2025 16:50)  HR: 74 (02 Feb 2025 16:50) (70 - 85)  BP: 120/72 (02 Feb 2025 16:50) (106/64 - 120/72)  BP(mean): 88 (02 Feb 2025 16:50) (88 - 88)  RR: 19 (02 Feb 2025 16:50) (18 - 19)  SpO2: 97% (02 Feb 2025 16:50) (93% - 99%)    Parameters below as of 02 Feb 2025 16:50  Patient On (Oxygen Delivery Method): nasal cannula  O2 Flow (L/min): 3      PHYSICAL EXAM:  GENERAL: NAD  HEAD:  Atraumatic   EYES: PERRLA  ENMT: Mouth moist   NECK: Supple  NERVOUS SYSTEM:  Awake, alert  CHEST/LUNG: Diminished , on NC  HEART: RRR, S1, S2  ABDOMEN: Soft, non tender  EXTREMITIES: 3+ BL LE  SKIN: No rash        LABS:    02-02    137  |  90[L]  |  76[H]  ----------------------------<  204[H]  3.1[L]   |  39[H]  |  2.78[H]    Ca    9.3      02 Feb 2025 05:32  Mg     2.1     02-02    TPro  7.1  /  Alb  2.8[L]  /  TBili  1.0  /  DBili  x   /  AST  47[H]  /  ALT  25  /  AlkPhos  288[H]  02-01      Urinalysis Basic - ( 02 Feb 2025 05:32 )    Color: x / Appearance: x / SG: x / pH: x  Gluc: 204 mg/dL / Ketone: x  / Bili: x / Urobili: x   Blood: x / Protein: x / Nitrite: x   Leuk Esterase: x / RBC: x / WBC x   Sq Epi: x / Non Sq Epi: x / Bacteria: x      CAPILLARY BLOOD GLUCOSE      POCT Blood Glucose.: 205 mg/dL (02 Feb 2025 16:33)  POCT Blood Glucose.: 186 mg/dL (02 Feb 2025 11:10)  POCT Blood Glucose.: 247 mg/dL (02 Feb 2025 08:00)  POCT Blood Glucose.: 290 mg/dL (01 Feb 2025 21:45)      RADIOLOGY & ADDITIONAL TESTS:    Imaging Personally Reviewed:  [ ] YES  [ ] NO    Consultant(s) Notes Reviewed:  [ ] YES  [ ] NO    Care Discussed with Consultants/Other Providers [ ] YES  [ ] NO

## 2025-02-02 NOTE — PROGRESS NOTE ADULT - SUBJECTIVE AND OBJECTIVE BOX
CARDIOLOGY PROGRESS NOTE  Patient is a 79y old  Male who presents with a chief complaint of worsening sob and edema   PAST MEDICAL & SURGICAL HISTORY: HTN (hypertension)  CAD  COPD (chronic obstructive pulmonary disease)  HLD (hyperlipidemia)  Insulin dependent type 2 diabetes mellitus  Chronic hypoxic respiratory failure, on home oxygen therapy  Stage 4 chronic kidney disease  Chronic combined systolic and diastolic heart failure  Unspecified atrial fibrillation  S/P CABG x 3 in 2003  s/p TAVR  S/P hernia repair hernia [id number]  S/P implantation of automatic cardioverter/defibrillator (AICD)  INTERVAL HISTORY: still with mild dyspnea, hypervolemia persists  	 MEDICATIONS  (STANDING): atorvastatin 20 milliGRAM(s) Oral at bedtime fluticasone propionate/ salmeterol 250-50 MICROgram(s) Diskus 1 Dose(s) Inhalation two times a day furosemide    Tablet 80 milliGRAM(s) Oral two times a day insulin lispro Injectable (ADMELOG) 3 Unit(s) SubCutaneous three times a day before meals metoprolol succinate ER 25 milliGRAM(s) Oral daily pantoprazole    Tablet 40 milliGRAM(s) Oral two times a day potassium chloride   Powder 40 milliEquivalent(s) Oral every 4 hours rivaroxaban 15 milliGRAM(s) Oral with dinner tamsulosin 0.4 milliGRAM(s) Oral at bedtime tiotropium 2.5 MICROgram(s) Inhaler 2 Puff(s) Inhalation daily   PHYSICAL EXAMINATION: ----------------------------- Vital Signs Last 24 Hrs T(C): 36.2 (02 Feb 2025 04:46), Max: 37.4 (01 Feb 2025 10:03) T(F): 97.2 (02 Feb 2025 04:46), Max: 99.4 (01 Feb 2025 10:03) HR: 75 (02 Feb 2025 08:07) (70 - 85) BP: 106/64 (02 Feb 2025 04:46) (101/60 - 119/64) BP(mean): 82 (01 Feb 2025 17:07) (82 - 82) RR: 18 (02 Feb 2025 04:46) (17 - 18) SpO2: 95% (02 Feb 2025 08:07) (93% - 98%)  Parameters below as of 02 Feb 2025 07:55 Patient On (Oxygen Delivery Method): nasal cannula   Constitutional: well developed, normal appearance, well groomed, well nourished, no deformities and no acute distress.  Eyes: the conjunctiva exhibited no abnormalities and the eyelids demonstrated no xanthelasmas.  HEENT: normal oral mucosa, no oral pallor and no oral cyanosis.  Neck: normal jugular venous A waves present, normal jugular venous V waves present and no jugular venous nava A waves.  Pulmonary: no respiratory distress, normal respiratory rhythm and effort, no accessory muscle use and decreased a/e to auscultation bilaterally.  Cardiovascular: heart rate and rhythm were normal, normal S1 and S2 and no murmur, gallop, rub, heave or thrill are present. +anasarca Abdomen: distended, non-tender, no hepato-splenomegaly and no abdominal mass palpated.  Musculoskeletal: the gait could not be assessed..  Extremities: no clubbing of the fingernails, no localized cyanosis, no petechial hemorrhages and no ischemic changes.  Skin: normal skin color and pigmentation, no rash, no venous stasis, no skin lesions, no skin ulcer and no xanthoma was observed.  Psychiatric: oriented to person, place, and time, the affect was normal, the mood was normal and not feeling anxious.   LABS:  -------- 02-02  137  |  90[L]  |  76[H] ----------------------------<  204[H] 3.1[L]   |  39[H]  |  2.78[H]  Ca    9.3      02 Feb 2025 05:32 Mg     2.1     02-02  TPro  7.1  /  Alb  2.8[L]  /  TBili  1.0  /  DBili  x   /  AST  47[H]  /  ALT  25  /  AlkPhos  288[H]  02-01 02-01  135  |  91[L]  |  75[H] ----------------------------<  153[H] 3.1[L]   |  38[H]  |  3.01[H]  Ca    9.4      01 Feb 2025 08:19 Phos  4.7     01-31 Mg     2.1     01-31      Paced on telemetry.   RADIOLOGY: < from: Xray Chest 1 View-PORTABLE IMMEDIATE (Xray Chest 1 View-PORTABLE IMMEDIATE .) (01.25.25 @ 10:52) > IMPRESSION: There is persistent cardiomegaly. Poststernotomy, CABG and  TAVR. Mild pulmonary venous congestive changes with persistent hazy  opacity over the left lower lung zone. Left-sided pleural effusion, left  lower lung zone pneumonia and/or atelectasis cannot be excluded.  < end of copied text >  DIAGNOSTIC TESTING:  [x ] Echocardiogram: < from: TTE Echo Complete w/ Contrast w/ Doppler (08.08.24 @ 13:27) >   1. Left ventricular cavity is normal in size. Left ventricular systolic  function is severely decreased with an ejection fraction visually  estimated at 25 to 30 %. Global left ventricular hypokinesis.  2. There is severe (grade 3) left ventricular diastolic dysfunction,  with elevated left ventricular filling pressure.  3. The right ventricle is not well visualized.  4. Device lead is visualized in the right heart.  5. No significant valvular disease.  6. No pericardial effusion seen.  7. Estimated pulmonary artery systolic pressure is 53 mmHg, consistent  with moderate pulmonary hypertension.  8. Left and moderate left pleural effusion noted.  9. Mild pulmonic regurgitation. 10. Moderate tricuspid regurgitation.  < end of copied text >   < from: NM Pharm Stress Test, Dual Isotope (03.17.21 @ 12:40) > 1. There was scintigraphic evidence for a myocardial infarct in the RCA territory with no significant ischemia.  2. There is severely abnormal left ventricular contractility, globally diminished calculated ejection fraction and no wall motion abnormlaities. Overall post stress ejection fraction was 21%  < end of copied text >  pro-BNP: Pro-Brain Natriuretic Peptide: 7384 pg/mL (01.28.25 @ 06:06)  Thyroid Stimulating Hormone, Serum: 2.850 uIU/mL (01.22.25 @ 06:31)            CARDIOLOGY PROGRESS NOTE  Patient is a 79y old  Male who presents with a chief complaint of worsening sob and edema   PAST MEDICAL & SURGICAL HISTORY: HTN (hypertension)  CAD  COPD (chronic obstructive pulmonary disease)  HLD (hyperlipidemia)  Insulin dependent type 2 diabetes mellitus  Chronic hypoxic respiratory failure, on home oxygen therapy  Stage 4 chronic kidney disease  Chronic combined systolic and diastolic heart failure  Unspecified atrial fibrillation  S/P CABG x 3 in 2003  s/p TAVR  S/P hernia repair hernia [id number]  S/P implantation of automatic cardioverter/defibrillator (AICD)  INTERVAL HISTORY: still with mild dyspnea, hypervolemia persists  	 MEDICATIONS  (STANDING): atorvastatin 20 milliGRAM(s) Oral at bedtime fluticasone propionate/ salmeterol 250-50 MICROgram(s) Diskus 1 Dose(s) Inhalation two times a day furosemide    Tablet 80 milliGRAM(s) Oral two times a day insulin lispro Injectable (ADMELOG) 3 Unit(s) SubCutaneous three times a day before meals metoprolol succinate ER 25 milliGRAM(s) Oral daily pantoprazole    Tablet 40 milliGRAM(s) Oral two times a day potassium chloride   Powder 40 milliEquivalent(s) Oral every 4 hours rivaroxaban 15 milliGRAM(s) Oral with dinner tamsulosin 0.4 milliGRAM(s) Oral at bedtime tiotropium 2.5 MICROgram(s) Inhaler 2 Puff(s) Inhalation daily   PHYSICAL EXAMINATION: ----------------------------- Vital Signs Last 24 Hrs T(C): 36.2 (02 Feb 2025 04:46), Max: 37.4 (01 Feb 2025 10:03) T(F): 97.2 (02 Feb 2025 04:46), Max: 99.4 (01 Feb 2025 10:03) HR: 75 (02 Feb 2025 08:07) (70 - 85) BP: 106/64 (02 Feb 2025 04:46) (101/60 - 119/64) BP(mean): 82 (01 Feb 2025 17:07) (82 - 82) RR: 18 (02 Feb 2025 04:46) (17 - 18) SpO2: 95% (02 Feb 2025 08:07) (93% - 98%)  Parameters below as of 02 Feb 2025 07:55 Patient On (Oxygen Delivery Method): nasal cannula   Constitutional: well developed, normal appearance, well groomed, well nourished, no deformities and no acute distress.  Eyes: the conjunctiva exhibited no abnormalities and the eyelids demonstrated no xanthelasmas.  HEENT: normal oral mucosa, no oral pallor and no oral cyanosis.  Neck: normal jugular venous A waves present, +JVD.  Pulmonary: no respiratory distress, normal respiratory rhythm and effort, no accessory muscle use and decreased a/e to auscultation bilaterally.  Cardiovascular: heart rate and rhythm were normal, normal S1 and S2 and no murmur, gallop, rub, heave or thrill are present. 1+ LE edema, sacral edema improved. Abdomen: distended, non-tender, no hepato-splenomegaly and no abdominal mass palpated.  Musculoskeletal: the gait could not be assessed..  Extremities: no clubbing of the fingernails, no localized cyanosis, no petechial hemorrhages and no ischemic changes.  Skin: normal skin color and pigmentation, no rash, no venous stasis, no skin lesions, no skin ulcer and no xanthoma was observed.  Psychiatric: oriented to person, place, and time, the affect was normal, the mood was normal and not feeling anxious.   LABS:  -------- 02-02  137  |  90[L]  |  76[H] ----------------------------<  204[H] 3.1[L]   |  39[H]  |  2.78[H]  Ca    9.3      02 Feb 2025 05:32 Mg     2.1     02-02  TPro  7.1  /  Alb  2.8[L]  /  TBili  1.0  /  DBili  x   /  AST  47[H]  /  ALT  25  /  AlkPhos  288[H]  02-01 02-01  135  |  91[L]  |  75[H] ----------------------------<  153[H] 3.1[L]   |  38[H]  |  3.01[H]  Ca    9.4      01 Feb 2025 08:19 Phos  4.7     01-31 Mg     2.1     01-31      Paced on telemetry.   RADIOLOGY: < from: Xray Chest 1 View-PORTABLE IMMEDIATE (Xray Chest 1 View-PORTABLE IMMEDIATE .) (01.25.25 @ 10:52) > IMPRESSION: There is persistent cardiomegaly. Poststernotomy, CABG and  TAVR. Mild pulmonary venous congestive changes with persistent hazy  opacity over the left lower lung zone. Left-sided pleural effusion, left  lower lung zone pneumonia and/or atelectasis cannot be excluded.  < end of copied text >  DIAGNOSTIC TESTING:  [x ] Echocardiogram: < from: TTE Echo Complete w/ Contrast w/ Doppler (08.08.24 @ 13:27) >   1. Left ventricular cavity is normal in size. Left ventricular systolic  function is severely decreased with an ejection fraction visually  estimated at 25 to 30 %. Global left ventricular hypokinesis.  2. There is severe (grade 3) left ventricular diastolic dysfunction,  with elevated left ventricular filling pressure.  3. The right ventricle is not well visualized.  4. Device lead is visualized in the right heart.  5. No significant valvular disease.  6. No pericardial effusion seen.  7. Estimated pulmonary artery systolic pressure is 53 mmHg, consistent  with moderate pulmonary hypertension.  8. Left and moderate left pleural effusion noted.  9. Mild pulmonic regurgitation. 10. Moderate tricuspid regurgitation.  < end of copied text >   < from: NM Pharm Stress Test, Dual Isotope (03.17.21 @ 12:40) > 1. There was scintigraphic evidence for a myocardial infarct in the RCA territory with no significant ischemia.  2. There is severely abnormal left ventricular contractility, globally diminished calculated ejection fraction and no wall motion abnormlaities. Overall post stress ejection fraction was 21%  < end of copied text >  pro-BNP: Pro-Brain Natriuretic Peptide: 7384 pg/mL (01.28.25 @ 06:06)  Thyroid Stimulating Hormone, Serum: 2.850 uIU/mL (01.22.25 @ 06:31)

## 2025-02-03 LAB
ANION GAP SERPL CALC-SCNC: 7 MMOL/L — SIGNIFICANT CHANGE UP (ref 5–17)
BUN SERPL-MCNC: 71 MG/DL — HIGH (ref 7–23)
CALCIUM SERPL-MCNC: 9.2 MG/DL — SIGNIFICANT CHANGE UP (ref 8.5–10.1)
CHLORIDE SERPL-SCNC: 90 MMOL/L — LOW (ref 96–108)
CO2 SERPL-SCNC: 39 MMOL/L — HIGH (ref 22–31)
CREAT SERPL-MCNC: 2.53 MG/DL — HIGH (ref 0.5–1.3)
EGFR: 25 ML/MIN/1.73M2 — LOW
GLUCOSE BLDC GLUCOMTR-MCNC: 143 MG/DL — HIGH (ref 70–99)
GLUCOSE BLDC GLUCOMTR-MCNC: 150 MG/DL — HIGH (ref 70–99)
GLUCOSE BLDC GLUCOMTR-MCNC: 155 MG/DL — HIGH (ref 70–99)
GLUCOSE BLDC GLUCOMTR-MCNC: 168 MG/DL — HIGH (ref 70–99)
GLUCOSE BLDC GLUCOMTR-MCNC: 202 MG/DL — HIGH (ref 70–99)
GLUCOSE SERPL-MCNC: 140 MG/DL — HIGH (ref 70–99)
HCT VFR BLD CALC: 33.3 % — LOW (ref 39–50)
HGB BLD-MCNC: 9.5 G/DL — LOW (ref 13–17)
MAGNESIUM SERPL-MCNC: 2 MG/DL — SIGNIFICANT CHANGE UP (ref 1.6–2.6)
MCHC RBC-ENTMCNC: 26.3 PG — LOW (ref 27–34)
MCHC RBC-ENTMCNC: 28.5 G/DL — LOW (ref 32–36)
MCV RBC AUTO: 92.2 FL — SIGNIFICANT CHANGE UP (ref 80–100)
NRBC # BLD: 0 /100 WBCS — SIGNIFICANT CHANGE UP (ref 0–0)
NRBC BLD-RTO: 0 /100 WBCS — SIGNIFICANT CHANGE UP (ref 0–0)
PHOSPHATE SERPL-MCNC: 3.5 MG/DL — SIGNIFICANT CHANGE UP (ref 2.5–4.5)
PLATELET # BLD AUTO: 197 K/UL — SIGNIFICANT CHANGE UP (ref 150–400)
POTASSIUM SERPL-MCNC: 3.4 MMOL/L — LOW (ref 3.5–5.3)
POTASSIUM SERPL-SCNC: 3.4 MMOL/L — LOW (ref 3.5–5.3)
RBC # BLD: 3.61 M/UL — LOW (ref 4.2–5.8)
RBC # FLD: 26 % — HIGH (ref 10.3–14.5)
SODIUM SERPL-SCNC: 136 MMOL/L — SIGNIFICANT CHANGE UP (ref 135–145)
WBC # BLD: 8.42 K/UL — SIGNIFICANT CHANGE UP (ref 3.8–10.5)
WBC # FLD AUTO: 8.42 K/UL — SIGNIFICANT CHANGE UP (ref 3.8–10.5)

## 2025-02-03 PROCEDURE — 99233 SBSQ HOSP IP/OBS HIGH 50: CPT

## 2025-02-03 PROCEDURE — 99232 SBSQ HOSP IP/OBS MODERATE 35: CPT

## 2025-02-03 RX ORDER — POTASSIUM CHLORIDE 750 MG/1
30 TABLET, EXTENDED RELEASE ORAL
Refills: 0 | Status: DISCONTINUED | OUTPATIENT
Start: 2025-02-04 | End: 2025-02-05

## 2025-02-03 RX ORDER — POTASSIUM CHLORIDE 750 MG/1
20 TABLET, EXTENDED RELEASE ORAL ONCE
Refills: 0 | Status: DISCONTINUED | OUTPATIENT
Start: 2025-02-03 | End: 2025-02-03

## 2025-02-03 RX ORDER — INSULIN GLARGINE-YFGN 100 [IU]/ML
16 INJECTION, SOLUTION SUBCUTANEOUS ONCE
Refills: 0 | Status: COMPLETED | OUTPATIENT
Start: 2025-02-03 | End: 2025-02-03

## 2025-02-03 RX ORDER — POTASSIUM CHLORIDE 750 MG/1
60 TABLET, EXTENDED RELEASE ORAL DAILY
Refills: 0 | Status: DISCONTINUED | OUTPATIENT
Start: 2025-02-03 | End: 2025-02-03

## 2025-02-03 RX ORDER — POTASSIUM CHLORIDE 750 MG/1
40 TABLET, EXTENDED RELEASE ORAL EVERY 6 HOURS
Refills: 0 | Status: COMPLETED | OUTPATIENT
Start: 2025-02-03 | End: 2025-02-03

## 2025-02-03 RX ORDER — BUMETANIDE 2 MG/1
2 TABLET ORAL EVERY 12 HOURS
Refills: 0 | Status: DISCONTINUED | OUTPATIENT
Start: 2025-02-04 | End: 2025-02-04

## 2025-02-03 RX ORDER — POTASSIUM CHLORIDE 750 MG/1
30 TABLET, EXTENDED RELEASE ORAL
Refills: 0 | Status: DISCONTINUED | OUTPATIENT
Start: 2025-02-03 | End: 2025-02-03

## 2025-02-03 RX ADMIN — Medication 3 UNIT(S): at 08:08

## 2025-02-03 RX ADMIN — INSULIN GLARGINE-YFGN 16 UNIT(S): 100 INJECTION, SOLUTION SUBCUTANEOUS at 22:28

## 2025-02-03 RX ADMIN — Medication 25 MILLIGRAM(S): at 06:15

## 2025-02-03 RX ADMIN — Medication 80 MILLIGRAM(S): at 06:15

## 2025-02-03 RX ADMIN — Medication 2: at 12:06

## 2025-02-03 RX ADMIN — RIVAROXABAN 15 MILLIGRAM(S): 20 TABLET, FILM COATED ORAL at 17:07

## 2025-02-03 RX ADMIN — Medication 3 UNIT(S): at 17:07

## 2025-02-03 RX ADMIN — TIOTROPIUM BROMIDE MONOHYDRATE 2 PUFF(S): 18 CAPSULE ORAL; RESPIRATORY (INHALATION) at 12:14

## 2025-02-03 RX ADMIN — ATORVASTATIN CALCIUM 20 MILLIGRAM(S): 80 TABLET, FILM COATED ORAL at 22:29

## 2025-02-03 RX ADMIN — PANTOPRAZOLE 40 MILLIGRAM(S): 20 TABLET, DELAYED RELEASE ORAL at 17:07

## 2025-02-03 RX ADMIN — POTASSIUM CHLORIDE 40 MILLIEQUIVALENT(S): 750 TABLET, EXTENDED RELEASE ORAL at 12:07

## 2025-02-03 RX ADMIN — TAMSULOSIN HYDROCHLORIDE 0.4 MILLIGRAM(S): 0.4 CAPSULE ORAL at 22:29

## 2025-02-03 RX ADMIN — Medication 3 UNIT(S): at 12:07

## 2025-02-03 RX ADMIN — FLUTICASONE PROPIONATE AND SALMETEROL 1 DOSE(S): 113; 14 POWDER, METERED RESPIRATORY (INHALATION) at 06:15

## 2025-02-03 RX ADMIN — POTASSIUM CHLORIDE 40 MILLIEQUIVALENT(S): 750 TABLET, EXTENDED RELEASE ORAL at 10:34

## 2025-02-03 RX ADMIN — PANTOPRAZOLE 40 MILLIGRAM(S): 20 TABLET, DELAYED RELEASE ORAL at 06:15

## 2025-02-03 RX ADMIN — FLUTICASONE PROPIONATE AND SALMETEROL 1 DOSE(S): 113; 14 POWDER, METERED RESPIRATORY (INHALATION) at 17:10

## 2025-02-03 NOTE — PROGRESS NOTE ADULT - ASSESSMENT
79M with CAD/CABG, s/p TAVR, HFrEF 25%, s/p ICD, afib, copd on home o2, htn, hld, dm, p/w adhf, acute anemia requiring PRBC transfusion     Remains volume overloaded on current Rx, slowly appears improving    Had been on bumex drip which was d/c'd 2/2 contraction alkalosis, s/p acetazolamide dosing, bicarb improved, 1/27 restarted on diuretics-> Lasix 80 IV q 8h -> Lasix drip at 30mg/hr, metolazone 10/d ->1/31 d/c'd as hypokalemia persists. Back on high dose oral loop diuretics, renal following, monitor renal function, electrolytes, bicarb.  Still needs (more) potassium repletion.  On Toprol 25/d, add further GDMT for cardiomyopathy as BP and renal function allows.   +Lt UE DVT - on AC, cont on Xarelto, home med  cont with COPD management   Increased dyspnea with drop in O2 sat with physical therapy, limited standing time 2/2 LE weakness    DNR/DNI

## 2025-02-03 NOTE — PROGRESS NOTE ADULT - SUBJECTIVE AND OBJECTIVE BOX
Patient is a 79y old  Male who presents with a chief complaint of worsening sob and edema     PAST MEDICAL & SURGICAL HISTORY:  HTN (hypertension)    COPD (chronic obstructive pulmonary disease)    HLD (hyperlipidemia)    Insulin dependent type 2 diabetes mellitus    Chronic hypoxic respiratory failure, on home oxygen therapy    Stage 4 chronic kidney disease    Chronic combined systolic and diastolic heart failure    Unspecified atrial fibrillation    S/P CABG x 3  in 2003    s/p TAVR    S/P hernia repair    S/P implantation of automatic cardioverter/defibrillator (AICD)    INTERVAL HISTORY: increased work of breathing with physical therapy with drop in O2 sat   	  MEDICATIONS:  MEDICATIONS  (STANDING):  atorvastatin 20 milliGRAM(s) Oral at bedtime  fluticasone propionate/ salmeterol 250-50 MICROgram(s) Diskus 1 Dose(s) Inhalation two times a day  insulin glargine Injectable (LANTUS) 32 Unit(s) SubCutaneous at bedtime  insulin lispro (ADMELOG) corrective regimen sliding scale   SubCutaneous three times a day before meals  insulin lispro Injectable (ADMELOG) 3 Unit(s) SubCutaneous three times a day before meals  metoprolol succinate ER 25 milliGRAM(s) Oral daily  pantoprazole    Tablet 40 milliGRAM(s) Oral two times a day  rivaroxaban 15 milliGRAM(s) Oral with dinner  tamsulosin 0.4 milliGRAM(s) Oral at bedtime  tiotropium 2.5 MICROgram(s) Inhaler 2 Puff(s) Inhalation daily    MEDICATIONS  (PRN):  acetaminophen     Tablet .. 650 milliGRAM(s) Oral every 6 hours PRN Temp greater or equal to 38C (100.4F), Mild Pain (1 - 3)  albuterol/ipratropium for Nebulization.. 3 milliLiter(s) Inhalation every 6 hours PRN -for shortness of breath and/or wheezing  dextrose Oral Gel 15 Gram(s) Oral once PRN Blood Glucose LESS THAN 70 milliGRAM(s)/deciliter    Vitals:  T(F): 97.7 (02-03-25 @ 10:32), Max: 98.8 (02-02-25 @ 16:50)  HR: 70 (02-03-25 @ 10:32) (70 - 76)  BP: 111/55 (02-03-25 @ 10:32) (111/55 - 121/79)  RR: 18 (02-03-25 @ 10:32) (18 - 19)  SpO2: 94% (02-03-25 @ 10:32) (94% - 99%)  Wt(kg): --108.5 kg    02-02 @ 07:01  -  02-03 @ 07:00  --------------------------------------------------------  IN:    Oral Fluid: 220 mL  Total IN: 220 mL    OUT:    Incontinent per Condom Catheter (mL): 500 mL    Voided (mL): 400 mL  Total OUT: 900 mL    Total NET: -680 mL    02-03 @ 07:01  -  02-03 @ 13:06  --------------------------------------------------------  IN:  Total IN: 0 mL    OUT:    Incontinent per Condom Catheter (mL): 320 mL  Total OUT: 320 mL    Total NET: -320 mL    PHYSICAL EXAM:  Neuro: Awake, responsive  CV: S1 S2 RRR +SM  Lungs: diminished to bases   GI: Softly distended, BS +, NT, + abd wall edema   Extremities: LE edema    TELEMETRY: paced, PVCs    RADIOLOGY: < from: Xray Chest 1 View-PORTABLE IMMEDIATE (Xray Chest 1 View-PORTABLE IMMEDIATE .) (01.25.25 @ 10:52) >  IMPRESSION: There is persistent cardiomegaly. Poststernotomy, CABG and   TAVR. Mild pulmonary venous congestive changes with persistent hazy   opacity over the left lower lung zone. Left-sided pleural effusion, left   lower lung zone pneumonia and/or atelectasis cannot be excluded.    < end of copied text >    DIAGNOSTIC TESTING:    [x ] Echocardiogram: < from: TTE Echo Complete w/ Contrast w/ Doppler (08.08.24 @ 13:27) >     1. Left ventricular cavity is normal in size. Left ventricular systolic   function is severely decreased with an ejection fraction visually   estimated at 25 to 30 %. Global left ventricular hypokinesis.   2. There is severe (grade 3) left ventricular diastolic dysfunction,   with elevated left ventricular filling pressure.   3. The right ventricle is not well visualized.   4. Device lead is visualized in the right heart.   5. No significant valvular disease.   6. No pericardial effusion seen.   7. Estimated pulmonary artery systolic pressure is 53 mmHg, consistent   with moderate pulmonary hypertension.   8. Left and moderate left pleural effusion noted.   9. Mild pulmonic regurgitation.  10. Moderate tricuspid regurgitation.    < end of copied text >    < from: NM Pharm Stress Test, Dual Isotope (03.17.21 @ 12:40) >  1. There was scintigraphic evidence for a myocardial infarct in the RCA territory with no significant ischemia.    2. There is severely abnormal left ventricular contractility, globally diminished calculated ejection fraction and no wall motion abnormlaities. Overall post stress ejection fraction was 21%    < end of copied text >    LABS:	 	    03 Feb 2025 06:18    136    |  90     |  71     ----------------------------<  140    3.4     |  39     |  2.53   02 Feb 2025 05:32    137    |  90     |  76     ----------------------------<  204    3.1     |  39     |  2.78   01 Feb 2025 18:20    136    |  90     |  76     ----------------------------<  211    4.0     |  37     |  2.96     Ca    9.2        03 Feb 2025 06:18  Phos  3.5       03 Feb 2025 06:18  Mg     2.0       03 Feb 2025 06:18    TPro  7.1    /  Alb  2.8    /  TBili  1.0    /  DBili  x      /  AST  47     /  ALT  25     /  AlkPhos  288    01 Feb 2025 18:20                        9.5    8.42  )-----------( 197      ( 03 Feb 2025 06:18 )             33.3   pro-BNP: Pro-Brain Natriuretic Peptide: 7384 pg/mL (01.28.25 @ 06:06)

## 2025-02-03 NOTE — PROGRESS NOTE ADULT - SUBJECTIVE AND OBJECTIVE BOX
Patient is a 79y old  Male who presents with a chief complaint of Acute on chronic combined HFpEF and HFrEF exacerbation, acute blood loss anemia secondary to suspected upper GI bleed (02 Feb 2025 22:45)      INTERVAL HPI/OVERNIGHT EVENTS:  Pt was seen and examined, no acute events.      MEDICATIONS  (STANDING):  atorvastatin 20 milliGRAM(s) Oral at bedtime  dextrose 5%. 1000 milliLiter(s) (50 mL/Hr) IV Continuous <Continuous>  dextrose 5%. 1000 milliLiter(s) (100 mL/Hr) IV Continuous <Continuous>  dextrose 50% Injectable 25 Gram(s) IV Push once  dextrose 50% Injectable 12.5 Gram(s) IV Push once  dextrose 50% Injectable 25 Gram(s) IV Push once  fluticasone propionate/ salmeterol 250-50 MICROgram(s) Diskus 1 Dose(s) Inhalation two times a day  glucagon  Injectable 1 milliGRAM(s) IntraMuscular once  glucagon  Injectable 1 milliGRAM(s) IntraMuscular once  influenza  Vaccine (HIGH DOSE) 0.5 milliLiter(s) IntraMuscular once  insulin glargine Injectable (LANTUS) 32 Unit(s) SubCutaneous at bedtime  insulin lispro (ADMELOG) corrective regimen sliding scale   SubCutaneous three times a day before meals  insulin lispro Injectable (ADMELOG) 3 Unit(s) SubCutaneous three times a day before meals  metoprolol succinate ER 25 milliGRAM(s) Oral daily  pantoprazole    Tablet 40 milliGRAM(s) Oral two times a day  rivaroxaban 15 milliGRAM(s) Oral with dinner  tamsulosin 0.4 milliGRAM(s) Oral at bedtime  tiotropium 2.5 MICROgram(s) Inhaler 2 Puff(s) Inhalation daily    MEDICATIONS  (PRN):  acetaminophen     Tablet .. 650 milliGRAM(s) Oral every 6 hours PRN Temp greater or equal to 38C (100.4F), Mild Pain (1 - 3)  albuterol/ipratropium for Nebulization.. 3 milliLiter(s) Inhalation every 6 hours PRN -for shortness of breath and/or wheezing  dextrose Oral Gel 15 Gram(s) Oral once PRN Blood Glucose LESS THAN 70 milliGRAM(s)/deciliter      Allergies  No Known Allergies        Vital Signs Last 24 Hrs  T(C): 36.5 (03 Feb 2025 10:32), Max: 37.1 (02 Feb 2025 16:50)  T(F): 97.7 (03 Feb 2025 10:32), Max: 98.8 (02 Feb 2025 16:50)  HR: 70 (03 Feb 2025 10:32) (70 - 76)  BP: 111/55 (03 Feb 2025 10:32) (111/55 - 121/79)  BP(mean): 88 (02 Feb 2025 16:50) (88 - 88)  RR: 18 (03 Feb 2025 10:32) (18 - 19)  SpO2: 94% (03 Feb 2025 10:32) (94% - 99%)    Parameters below as of 02 Feb 2025 16:50  Patient On (Oxygen Delivery Method): nasal cannula  O2 Flow (L/min): 3      PHYSICAL EXAM:  GENERAL: NAD  HEAD:  Atraumatic   EYES: PERRLA  ENMT: Mouth moist   NECK: Supple  NERVOUS SYSTEM:  Awake, alert  CHEST/LUNG: Diminished , on NC  HEART: RRR, S1, S2  ABDOMEN: Soft, non tender  EXTREMITIES: 3+ BL LE  SKIN: No rash      LABS:                        9.5    8.42  )-----------( 197      ( 03 Feb 2025 06:18 )             33.3     02-03    136  |  90[L]  |  71[H]  ----------------------------<  140[H]  3.4[L]   |  39[H]  |  2.53[H]    Ca    9.2      03 Feb 2025 06:18  Phos  3.5     02-03  Mg     2.0     02-03    TPro  7.1  /  Alb  2.8[L]  /  TBili  1.0  /  DBili  x   /  AST  47[H]  /  ALT  25  /  AlkPhos  288[H]  02-01      Urinalysis Basic - ( 03 Feb 2025 06:18 )    Color: x / Appearance: x / SG: x / pH: x  Gluc: 140 mg/dL / Ketone: x  / Bili: x / Urobili: x   Blood: x / Protein: x / Nitrite: x   Leuk Esterase: x / RBC: x / WBC x   Sq Epi: x / Non Sq Epi: x / Bacteria: x      CAPILLARY BLOOD GLUCOSE      POCT Blood Glucose.: 202 mg/dL (03 Feb 2025 11:08)  POCT Blood Glucose.: 150 mg/dL (03 Feb 2025 07:52)  POCT Blood Glucose.: 232 mg/dL (02 Feb 2025 22:46)  POCT Blood Glucose.: 205 mg/dL (02 Feb 2025 16:33)      RADIOLOGY & ADDITIONAL TESTS:    Imaging Personally Reviewed:  [ ] YES  [ ] NO    Consultant(s) Notes Reviewed:  [ ] YES  [ ] NO    Care Discussed with Consultants/Other Providers [ ] YES  [ ] NO

## 2025-02-03 NOTE — PROGRESS NOTE ADULT - ASSESSMENT
79M with H/O CAD /CABG,  HFrEF 25%, ICD,  c/c  Afib , COPD , on home o2,  HTN/ HLD. DM admitted with sob from  anemia requiring PRBC transfusion and  from acute on chronic  systolic chf, Ef 25, and diastolic  dysfunction, mod TR, mod  PTHn, Cardiomyopathy, S/P AICD, S/P bumex drip-> S/P Lasix 80 iv-> complicated 2/2 contraction alkalosis. and persistent hypokalemia.   +Lt  brachial/  cephalic IV  phlebitis  DENISHA on CKD. stage  3. renal following , Cr stable  Chronic Afib, on Xarelto  Symptomatic anemia, S/p transfusion, seen by GI  DM, on lantus   Lipitor,  toprol, lantus, xarelto.    Net negative  with decrease  in pedal  edema, resume Lasix PO  Daily K supplement   On 3L NC   Obesity, BMI  35  Discussed with wife at  bedside    Pt is DNR/I  Prepare to dc to [facility]. pt is medically stable for dc         79M with H/O CAD /CABG,  HFrEF 25%, ICD,  c/c  Afib , COPD , on home o2,  HTN/ HLD. DM admitted with sob from  anemia requiring PRBC transfusion and  from acute on chronic  systolic chf, Ef 25, and diastolic  dysfunction, mod TR, mod  PTHn, Cardiomyopathy, S/P AICD, S/P bumex drip-> S/P Lasix 80 iv-> complicated 2/2 contraction alkalosis. and persistent hypokalemia.   +Lt  brachial/  cephalic IV  phlebitis  DENISHA on CKD. stage  3. renal following , Cr stable  Chronic Afib, on Xarelto  Symptomatic anemia, S/p transfusion, seen by GI  DM, on lantus   Lipitor,  toprol, lantus, xarelto.    Net negative  with decrease  in pedal  edema, resume Lasix PO  Daily K supplement   On 3L NC   Obesity, BMI  35  Discussed with wife at  bedside    Pt is DNR/I  Prepare to dc to [facility]. pt is medically stable for dc     Addendum:  Discussed with cardio. pt has SOB with minimal exertion. Card recommended Tx to [facility] for heart failure consult.

## 2025-02-03 NOTE — PROGRESS NOTE ADULT - SUBJECTIVE AND OBJECTIVE BOX
[facility] NEPHROLOGY SERVICES, [facility]  NEPHROLOGY AND HYPERTENSION  [address]  [address]  [place] [address]  [phone]    MD DANIEL GARRETT MD YELENA ROSENBERG, MD BINNY KOSHY, MD CHRISTOPHER CAPUTO, MD EDWARD BOVER, MD          Patient events noted    MEDICATIONS  (STANDING):  atorvastatin 20 milliGRAM(s) Oral at bedtime  dextrose 5%. 1000 milliLiter(s) (50 mL/Hr) IV Continuous <Continuous>  dextrose 5%. 1000 milliLiter(s) (100 mL/Hr) IV Continuous <Continuous>  dextrose 50% Injectable 25 Gram(s) IV Push once  dextrose 50% Injectable 12.5 Gram(s) IV Push once  dextrose 50% Injectable 25 Gram(s) IV Push once  fluticasone propionate/ salmeterol 250-50 MICROgram(s) Diskus 1 Dose(s) Inhalation two times a day  glucagon  Injectable 1 milliGRAM(s) IntraMuscular once  glucagon  Injectable 1 milliGRAM(s) IntraMuscular once  influenza  Vaccine (HIGH DOSE) 0.5 milliLiter(s) IntraMuscular once  insulin glargine Injectable (LANTUS) 32 Unit(s) SubCutaneous at bedtime  insulin lispro (ADMELOG) corrective regimen sliding scale   SubCutaneous three times a day before meals  insulin lispro Injectable (ADMELOG) 3 Unit(s) SubCutaneous three times a day before meals  metoprolol succinate ER 25 milliGRAM(s) Oral daily  pantoprazole    Tablet 40 milliGRAM(s) Oral two times a day  rivaroxaban 15 milliGRAM(s) Oral with dinner  tamsulosin 0.4 milliGRAM(s) Oral at bedtime  tiotropium 2.5 MICROgram(s) Inhaler 2 Puff(s) Inhalation daily    MEDICATIONS  (PRN):  acetaminophen     Tablet .. 650 milliGRAM(s) Oral every 6 hours PRN Temp greater or equal to 38C (100.4F), Mild Pain (1 - 3)  albuterol/ipratropium for Nebulization.. 3 milliLiter(s) Inhalation every 6 hours PRN -for shortness of breath and/or wheezing  dextrose Oral Gel 15 Gram(s) Oral once PRN Blood Glucose LESS THAN 70 milliGRAM(s)/deciliter      02-02-25 @ 07:01  -  02-03-25 @ 07:00  --------------------------------------------------------  IN: 220 mL / OUT: 900 mL / NET: -680 mL    02-03-25 @ 07:01  -  02-03-25 @ 21:10  --------------------------------------------------------  IN: 0 mL / OUT: 1120 mL / NET: -1120 mL      PHYSICAL EXAM:      T(C): 37.1 (02-03-25 @ 15:59), Max: 37.1 (02-03-25 @ 15:59)  HR: 76 (02-03-25 @ 18:16) (70 - 76)  BP: 110/63 (02-03-25 @ 15:59) (110/63 - 121/79)  RR: 18 (02-03-25 @ 15:59) (18 - 18)  SpO2: 96% (02-03-25 @ 18:16) (94% - 99%)  Wt(kg): --  Lungs clear anteriorly   Heart S1S2  Abd soft NT ND  Extremities:   1-2 edema                                    9.5    8.42  )-----------( 197      ( 03 Feb 2025 06:18 )             33.3     02-03    136  |  90[L]  |  71[H]  ----------------------------<  140[H]  3.4[L]   |  39[H]  |  2.53[H]    Ca    9.2      03 Feb 2025 06:18  Phos  3.5     02-03  Mg     2.0     02-03          Creatinine Trend: 2.53<--, 2.78<--, 2.96<--, 3.01<--, 2.96<--, 2.88<--    Assessment    CM, EF 25 - 30%, mod TR  Fluid overload   DENISHA/CKD 3  Contraction alkalosis   Overall stable renal indices    Plan  Continue diuresis with PO Bumex  Suppl K  Follow VBG  Weight on scale   F/u BMP, Mg, UO  Avoid nephrotoxins as able      Jon Rowe MD

## 2025-02-04 LAB
ANION GAP SERPL CALC-SCNC: 4 MMOL/L — LOW (ref 5–17)
BASE EXCESS BLDV CALC-SCNC: 24.1 MMOL/L — HIGH (ref -2–3)
BLOOD GAS COMMENTS, VENOUS: SIGNIFICANT CHANGE UP
BUN SERPL-MCNC: 71 MG/DL — HIGH (ref 7–23)
CALCIUM SERPL-MCNC: 9.5 MG/DL — SIGNIFICANT CHANGE UP (ref 8.5–10.1)
CHLORIDE SERPL-SCNC: 92 MMOL/L — LOW (ref 96–108)
CO2 BLDV-SCNC: 51 MMOL/L — CRITICAL HIGH (ref 22–26)
CO2 SERPL-SCNC: 41 MMOL/L — HIGH (ref 22–31)
CREAT SERPL-MCNC: 2.44 MG/DL — HIGH (ref 0.5–1.3)
EGFR: 26 ML/MIN/1.73M2 — LOW
GAS PNL BLDV: SIGNIFICANT CHANGE UP
GLUCOSE BLDC GLUCOMTR-MCNC: 118 MG/DL — HIGH (ref 70–99)
GLUCOSE BLDC GLUCOMTR-MCNC: 136 MG/DL — HIGH (ref 70–99)
GLUCOSE BLDC GLUCOMTR-MCNC: 149 MG/DL — HIGH (ref 70–99)
GLUCOSE BLDC GLUCOMTR-MCNC: 181 MG/DL — HIGH (ref 70–99)
GLUCOSE SERPL-MCNC: 86 MG/DL — SIGNIFICANT CHANGE UP (ref 70–99)
HCO3 BLDV-SCNC: 50 MMOL/L — CRITICAL HIGH (ref 22–28)
HOROWITZ INDEX BLDV+IHG-RTO: SIGNIFICANT CHANGE UP
PCO2 BLDV: 53 MMHG — SIGNIFICANT CHANGE UP (ref 42–55)
PH BLDV: 7.58 — HIGH (ref 7.32–7.43)
PO2 BLDV: 143 MMHG — HIGH (ref 25–45)
POTASSIUM SERPL-MCNC: 3.3 MMOL/L — LOW (ref 3.5–5.3)
POTASSIUM SERPL-SCNC: 3.3 MMOL/L — LOW (ref 3.5–5.3)
SAO2 % BLDV: 98.5 % — HIGH (ref 94–98)
SODIUM SERPL-SCNC: 137 MMOL/L — SIGNIFICANT CHANGE UP (ref 135–145)

## 2025-02-04 PROCEDURE — 99233 SBSQ HOSP IP/OBS HIGH 50: CPT

## 2025-02-04 RX ORDER — BUMETANIDE 2 MG/1
2 TABLET ORAL EVERY 12 HOURS
Refills: 0 | Status: DISCONTINUED | OUTPATIENT
Start: 2025-02-05 | End: 2025-02-05

## 2025-02-04 RX ORDER — ONDANSETRON 4 MG/1
4 TABLET, ORALLY DISINTEGRATING ORAL ONCE
Refills: 0 | Status: COMPLETED | OUTPATIENT
Start: 2025-02-04 | End: 2025-02-04

## 2025-02-04 RX ADMIN — FLUTICASONE PROPIONATE AND SALMETEROL 1 DOSE(S): 113; 14 POWDER, METERED RESPIRATORY (INHALATION) at 05:47

## 2025-02-04 RX ADMIN — Medication 25 MILLIGRAM(S): at 05:47

## 2025-02-04 RX ADMIN — Medication 3 UNIT(S): at 07:59

## 2025-02-04 RX ADMIN — Medication 3 UNIT(S): at 11:34

## 2025-02-04 RX ADMIN — Medication 3 UNIT(S): at 17:10

## 2025-02-04 RX ADMIN — ATORVASTATIN CALCIUM 20 MILLIGRAM(S): 80 TABLET, FILM COATED ORAL at 21:56

## 2025-02-04 RX ADMIN — INSULIN GLARGINE-YFGN 32 UNIT(S): 100 INJECTION, SOLUTION SUBCUTANEOUS at 21:56

## 2025-02-04 RX ADMIN — PANTOPRAZOLE 40 MILLIGRAM(S): 20 TABLET, DELAYED RELEASE ORAL at 05:47

## 2025-02-04 RX ADMIN — TAMSULOSIN HYDROCHLORIDE 0.4 MILLIGRAM(S): 0.4 CAPSULE ORAL at 21:56

## 2025-02-04 RX ADMIN — POTASSIUM CHLORIDE 30 MILLIEQUIVALENT(S): 750 TABLET, EXTENDED RELEASE ORAL at 17:11

## 2025-02-04 RX ADMIN — FLUTICASONE PROPIONATE AND SALMETEROL 1 DOSE(S): 113; 14 POWDER, METERED RESPIRATORY (INHALATION) at 17:10

## 2025-02-04 RX ADMIN — PANTOPRAZOLE 40 MILLIGRAM(S): 20 TABLET, DELAYED RELEASE ORAL at 17:11

## 2025-02-04 RX ADMIN — BUMETANIDE 2 MILLIGRAM(S): 2 TABLET ORAL at 05:47

## 2025-02-04 RX ADMIN — ONDANSETRON 4 MILLIGRAM(S): 4 TABLET, ORALLY DISINTEGRATING ORAL at 15:23

## 2025-02-04 RX ADMIN — RIVAROXABAN 15 MILLIGRAM(S): 20 TABLET, FILM COATED ORAL at 17:11

## 2025-02-04 RX ADMIN — Medication 250 MILLIGRAM(S): at 14:02

## 2025-02-04 RX ADMIN — TIOTROPIUM BROMIDE MONOHYDRATE 2 PUFF(S): 18 CAPSULE ORAL; RESPIRATORY (INHALATION) at 11:35

## 2025-02-04 RX ADMIN — POTASSIUM CHLORIDE 30 MILLIEQUIVALENT(S): 750 TABLET, EXTENDED RELEASE ORAL at 05:47

## 2025-02-04 NOTE — PROGRESS NOTE ADULT - SUBJECTIVE AND OBJECTIVE BOX
Patient is a 79y old  Male who presents with a chief complaint of sob and edema     PAST MEDICAL & SURGICAL HISTORY:  HTN (hypertension)    COPD (chronic obstructive pulmonary disease)    HLD (hyperlipidemia)    Insulin dependent type 2 diabetes mellitus    Chronic hypoxic respiratory failure, on home oxygen therapy    Stage 4 chronic kidney disease    Chronic combined systolic and diastolic heart failure    atrial fibrillation    S/P CABG x 3 in 2003    S/P hernia repair  hernia [id number]    S/P implantation of automatic cardioverter/defibrillator (AICD)    s/p TAVR    INTERVAL HISTORY: in no acute distress,   	  MEDICATIONS:  MEDICATIONS  (STANDING):  acetaZOLAMIDE Injectable 250 milliGRAM(s) IV Push every 12 hours  x2 doses   atorvastatin 20 milliGRAM(s) Oral at bedtime  fluticasone propionate/ salmeterol 250-50 MICROgram(s) Diskus 1 Dose(s) Inhalation two times a day  insulin glargine Injectable (LANTUS) 32 Unit(s) SubCutaneous at bedtime  insulin lispro (ADMELOG) corrective regimen sliding scale   SubCutaneous three times a day before meals  insulin lispro Injectable (ADMELOG) 3 Unit(s) SubCutaneous three times a day before meals  metoprolol succinate ER 25 milliGRAM(s) Oral daily  pantoprazole    Tablet 40 milliGRAM(s) Oral two times a day  potassium chloride    Tablet ER 30 milliEquivalent(s) Oral two times a day  rivaroxaban 15 milliGRAM(s) Oral with dinner  tamsulosin 0.4 milliGRAM(s) Oral at bedtime  tiotropium 2.5 MICROgram(s) Inhaler 2 Puff(s) Inhalation daily    MEDICATIONS  (PRN):  acetaminophen     Tablet .. 650 milliGRAM(s) Oral every 6 hours PRN Temp greater or equal to 38C (100.4F), Mild Pain (1 - 3)  albuterol/ipratropium for Nebulization.. 3 milliLiter(s) Inhalation every 6 hours PRN -for shortness of breath and/or wheezing  dextrose Oral Gel 15 Gram(s) Oral once PRN Blood Glucose LESS THAN 70 milliGRAM(s)/deciliter    Vitals:  T(F): 98.2 (02-04-25 @ 10:08), Max: 98.8 (02-04-25 @ 04:56)  HR: 70 (02-04-25 @ 10:08) (70 - 84)  BP: 114/67 (02-04-25 @ 10:08) (110/63 - 134/71)  RR: 18 (02-04-25 @ 10:08) (18 - 18)  SpO2: 99% (02-04-25 @ 10:08) (91% - 99%)    02-03 @ 07:01  -  02-04 @ 07:00  --------------------------------------------------------  IN:    Oral Fluid: 350 mL  Total IN: 350 mL    OUT:    Blood Loss (mL): 1 mL    Incontinent per Condom Catheter (mL): 820 mL    Voided (mL): 1600 mL  Total OUT: 2421 mL    Total NET: -2071 mL    02-04 @ 07:01  -  02-04 @ 13:10  --------------------------------------------------------  IN:    Oral Fluid: 420 mL  Total IN: 420 mL    OUT:  Total OUT: 0 mL    Total NET: 420 mL    PHYSICAL EXAM:  Neuro: Awake, responsive  CV: S1 S2 RRR +SM  Lungs: diminished to bases   GI: Softly distended, BS +, NT, abd wall edema   Extremities: LE edema    RADIOLOGY: < from: Xray Chest 1 View-PORTABLE IMMEDIATE (Xray Chest 1 View-PORTABLE IMMEDIATE .) (01.25.25 @ 10:52) >  IMPRESSION: There is persistent cardiomegaly. Poststernotomy, CABG and   TAVR. Mild pulmonary venous congestive changes with persistent hazy   opacity over the left lower lung zone. Left-sided pleural effusion, left   lower lung zone pneumonia and/or atelectasis cannot be excluded.    < end of copied text >    DIAGNOSTIC TESTING:    [x ] Echocardiogram: < from: TTE Echo Complete w/ Contrast w/ Doppler (08.08.24 @ 13:27) >     1. Left ventricular cavity is normal in size. Left ventricular systolic   function is severely decreased with an ejection fraction visually   estimated at 25 to 30 %. Global left ventricular hypokinesis.   2. There is severe (grade 3) left ventricular diastolic dysfunction,   with elevated left ventricular filling pressure.   3. The right ventricle is not well visualized.   4. Device lead is visualized in the right heart.   5. No significant valvular disease.   6. No pericardial effusion seen.   7. Estimated pulmonary artery systolic pressure is 53 mmHg, consistent   with moderate pulmonary hypertension.   8. Left and moderate left pleural effusion noted.   9. Mild pulmonic regurgitation.  10. Moderate tricuspid regurgitation.    < end of copied text >     < from: NM Pharm Stress Test, Dual Isotope (03.17.21 @ 12:40) >    1. There was scintigraphic evidence for a myocardial infarct in the RCA territory with no significant ischemia.    2. There is severely abnormal left ventricular contractility, globally diminished calculated ejection fraction and no wall motion abnormlaities. Overall post stress ejection fraction was 21%    LABS:	 	    04 Feb 2025 08:47    137    |  92     |  71     ----------------------------<  86     3.3     |  41     |  2.44   03 Feb 2025 06:18    136    |  90     |  71     ----------------------------<  140    3.4     |  39     |  2.53   02 Feb 2025 05:32    137    |  90     |  76     ----------------------------<  204    3.1     |  39     |  2.78     Ca    9.5        04 Feb 2025 08:47  Phos  3.5       03 Feb 2025 06:18  Mg     2.0       03 Feb 2025 06:18                       9.5    8.42  )-----------( 197      ( 03 Feb 2025 06:18 )             33.3   pro-BNP: Pro-Brain Natriuretic Peptide: 7384 pg/mL (01.28.25 @ 06:06)

## 2025-02-04 NOTE — PROGRESS NOTE ADULT - SUBJECTIVE AND OBJECTIVE BOX
[facility] NEPHROLOGY SERVICES, [facility]  NEPHROLOGY AND HYPERTENSION  [address]  [address]  [place] [address]  [phone]    MD DANIEL GARRETT MD YELENA ROSENBERG, MD BINNY KOSHY, MD CHRISTOPHER CAPUTO, MD EDWARD BOVER, MD          Patient events noted    MEDICATIONS  (STANDING):  acetaZOLAMIDE Injectable 250 milliGRAM(s) IV Push every 12 hours  atorvastatin 20 milliGRAM(s) Oral at bedtime  buMETAnide 2 milliGRAM(s) Oral every 12 hours  dextrose 5%. 1000 milliLiter(s) (50 mL/Hr) IV Continuous <Continuous>  dextrose 5%. 1000 milliLiter(s) (100 mL/Hr) IV Continuous <Continuous>  dextrose 50% Injectable 25 Gram(s) IV Push once  dextrose 50% Injectable 12.5 Gram(s) IV Push once  dextrose 50% Injectable 25 Gram(s) IV Push once  fluticasone propionate/ salmeterol 250-50 MICROgram(s) Diskus 1 Dose(s) Inhalation two times a day  glucagon  Injectable 1 milliGRAM(s) IntraMuscular once  glucagon  Injectable 1 milliGRAM(s) IntraMuscular once  influenza  Vaccine (HIGH DOSE) 0.5 milliLiter(s) IntraMuscular once  insulin glargine Injectable (LANTUS) 32 Unit(s) SubCutaneous at bedtime  insulin lispro (ADMELOG) corrective regimen sliding scale   SubCutaneous three times a day before meals  insulin lispro Injectable (ADMELOG) 3 Unit(s) SubCutaneous three times a day before meals  metoprolol succinate ER 25 milliGRAM(s) Oral daily  pantoprazole    Tablet 40 milliGRAM(s) Oral two times a day  potassium chloride    Tablet ER 30 milliEquivalent(s) Oral two times a day  rivaroxaban 15 milliGRAM(s) Oral with dinner  tamsulosin 0.4 milliGRAM(s) Oral at bedtime  tiotropium 2.5 MICROgram(s) Inhaler 2 Puff(s) Inhalation daily    MEDICATIONS  (PRN):  acetaminophen     Tablet .. 650 milliGRAM(s) Oral every 6 hours PRN Temp greater or equal to 38C (100.4F), Mild Pain (1 - 3)  albuterol/ipratropium for Nebulization.. 3 milliLiter(s) Inhalation every 6 hours PRN -for shortness of breath and/or wheezing  dextrose Oral Gel 15 Gram(s) Oral once PRN Blood Glucose LESS THAN 70 milliGRAM(s)/deciliter            PHYSICAL EXAM:        Wt(kg): --  Lungs clear  Heart S1S2  Abd soft NT ND  Extremities:   tr edema    Basic Metabolic Panel in AM (02.04.25 @ 08:47)    Sodium: 137 mmol/L   Potassium: 3.3 mmol/L   Chloride: 92 mmol/L   Carbon Dioxide: 41 mmol/L   Anion Gap: 4 mmol/L   Blood Urea Nitrogen: 71 mg/dL   Creatinine: 2.44 mg/dL   Glucose: 86 mg/dL   Calcium: 9.5 mg/dL   eGFR: 26: The estimated glomerular filtration rate (eGFR) calculation is based on  the 2021 CKD-EPI creatinine equation, which is validated in male and  female population 18 years of age and older (N Engl J Med 2021;  385:0569-6580). mL/min/1.73m2                           Assessment    CM, EF 25 - 30%, mod TR  Fluid overload   DENISHA/CKD 3  Contraction alkalosis   Overall stable renal indices, however, overall diuresis modest and complicated by severe metabolic alkalosis     Plan  Add Diamox 250 mg q 12  Suppl K  Follow VBG  Weight on scale   F/u BMP, Mg, UO  Avoid nephrotoxins as able  Patient and wife agreeable to HD if all options exhausted       Jon Rowe MD

## 2025-02-04 NOTE — PROGRESS NOTE ADULT - SUBJECTIVE AND OBJECTIVE BOX
HPI:  Arun Solares is an 79 year old male with PMHx of HTN, HLD, IDDM2, CAD (s/p CABG), pAF (on rivaroxaban), chronic combined HFpEF and HFrEF (LV EF 25-30% and grade III diastolic dysfunction on echo 8/9/24, s/p AICD placement), hx of severe aortic stenosis (s/p TAVR), chronic hypoxic respiratory failure secondary to COPD (baseline on 3-4L NC at home), CKD stage IV, and BPH who presented to the ED on 1/10/25 for complaints of shortness of breath.    History primarily obtained form wife at bedside. As per wife at bedside, patient is chronically short of breath and uses up to 4L NC continuously. However, for the past two weeks, he has been more short of breath and had increased fluid retention. States his left hand, abdomen, and leg have been very swollen. Sleeps on recliner as he is unable to lay flat. No paroxysmal nocturnal dyspnea. Reports intermittent productive cough with white/grey phlegm as well. Denies taking OTC antitussives. No recent travel or known sick contacts. Has been compliant with supplemental oxygen and diuretics. However, states he was discharged from [facility] in August 2025 with prescription of spironolactone 25 mg. Took 1-month supply and then stopped taking it as no other physician gave him another prescription for it. In addition, patient states he has had two episodes of black stools. Took ibuprofen a few days ago for hand spasms. Last took aspirin and xarelto today. States last colonoscopy was > 10 years ago and he had a few polyps removed at that time. Baseline functional status is ambulates unassisted and sometimes uses a walker. Independent with all ADLs. Lives at home with wife.     In the ED, VSS. No documented hypoxia but placed on BiPAP 15/6 on 100%. WBC 13.16K, hgb 6.5, sodium 132, BUN 61, Cr 2.71, blood glucose 256. Troponin WNL. Pro-BNP 4773. ABG 7.51 / 44 / 415 / 35 on BiPAP 15/6 on 100%. CXR personally reviewed; cardiomegaly, vascular congestion, blunting of L. costophrenic angle to suggest pleural effusion, appears unchanged from CXR 8/8/24. Received furosemide 40 mg IV. Evaluated by nephrology who recommends furosemide 40 mg IV daily given takes 40 mg PO BID at home. Requested ER physician to decrease FiO2 given O2 on ABG > 400.  (10 Tay 2025 18:09)    Pt feeling better. SOB improving. No active bleeding  REVIEW OF SYSTEMS unremarkable      General:	    Skin/Breast:  	  Ophthalmologic:  	  ENMT:	    Respiratory and Thorax:  	  Cardiovascular:	    Gastrointestinal:	    Genitourinary:	    Musculoskeletal:	    Neurological:	    Psychiatric:	    Hematology/Lymphatics:	    Endocrine:	    Allergic/Immunologic:	    MEDICATIONS  (STANDING):  acetaZOLAMIDE Injectable 250 milliGRAM(s) IV Push every 12 hours  atorvastatin 20 milliGRAM(s) Oral at bedtime  dextrose 5%. 1000 milliLiter(s) (50 mL/Hr) IV Continuous <Continuous>  dextrose 5%. 1000 milliLiter(s) (100 mL/Hr) IV Continuous <Continuous>  dextrose 50% Injectable 25 Gram(s) IV Push once  dextrose 50% Injectable 12.5 Gram(s) IV Push once  dextrose 50% Injectable 25 Gram(s) IV Push once  fluticasone propionate/ salmeterol 250-50 MICROgram(s) Diskus 1 Dose(s) Inhalation two times a day  glucagon  Injectable 1 milliGRAM(s) IntraMuscular once  glucagon  Injectable 1 milliGRAM(s) IntraMuscular once  influenza  Vaccine (HIGH DOSE) 0.5 milliLiter(s) IntraMuscular once  insulin glargine Injectable (LANTUS) 32 Unit(s) SubCutaneous at bedtime  insulin lispro (ADMELOG) corrective regimen sliding scale   SubCutaneous three times a day before meals  insulin lispro Injectable (ADMELOG) 3 Unit(s) SubCutaneous three times a day before meals  metoprolol succinate ER 25 milliGRAM(s) Oral daily  pantoprazole    Tablet 40 milliGRAM(s) Oral two times a day  potassium chloride    Tablet ER 30 milliEquivalent(s) Oral two times a day  rivaroxaban 15 milliGRAM(s) Oral with dinner  tamsulosin 0.4 milliGRAM(s) Oral at bedtime  tiotropium 2.5 MICROgram(s) Inhaler 2 Puff(s) Inhalation daily    MEDICATIONS  (PRN):  acetaminophen     Tablet .. 650 milliGRAM(s) Oral every 6 hours PRN Temp greater or equal to 38C (100.4F), Mild Pain (1 - 3)  albuterol/ipratropium for Nebulization.. 3 milliLiter(s) Inhalation every 6 hours PRN -for shortness of breath and/or wheezing  dextrose Oral Gel 15 Gram(s) Oral once PRN Blood Glucose LESS THAN 70 milliGRAM(s)/deciliter      Vital Signs Last 24 Hrs  T(C): 36.7 (04 Feb 2025 16:03), Max: 37.1 (04 Feb 2025 04:56)  T(F): 98.1 (04 Feb 2025 16:03), Max: 98.8 (04 Feb 2025 04:56)  HR: 70 (04 Feb 2025 17:38) (70 - 84)  BP: 111/64 (04 Feb 2025 16:03) (111/64 - 134/71)  BP(mean): --  RR: 18 (04 Feb 2025 16:03) (18 - 18)  SpO2: 94% (04 Feb 2025 17:38) (91% - 99%)    Parameters below as of 04 Feb 2025 16:03  Patient On (Oxygen Delivery Method): nasal cannula  O2 Flow (L/min): 3      PHYSICAL EXAM:      Constitutional: in no distress    Eyes: normal    ENMT:    Neck: supple    Breasts:    Back:    Respiratory: normal    Cardiovascular: normal    Gastrointestinal: normal    Genitourinary:    Rectal:    Extremities:    Vascular:    Neurological:    Skin:    Lymph Nodes:    Musculoskeletal:    Psychiatric:            HEALTH ISSUES - PROBLEM Dx:  Acute on chronic combined systolic and diastolic congestive heart failure    Anemia due to acute blood loss    Upper GI bleed    HTN (hypertension)    HLD (hyperlipidemia)    Type 2 diabetes mellitus with hyperglycemia, with long-term current use of insulin    Unspecified atrial fibrillation    Chronic hypoxic respiratory failure, on home oxygen therapy    Stage 4 chronic kidney disease    CAD (coronary artery disease)    COPD without exacerbation    Heart failure    Weakness    Encounter for palliative care              Assesment & Plan: GI bleeding. Stable. Monitor H/H

## 2025-02-04 NOTE — PROGRESS NOTE ADULT - ASSESSMENT
79M with CAD/CABG, s/p TAVR, HFrEF 25%, s/p ICD, afib, copd on home o2, htn, hld, dm, p/w adhf, acute anemia requiring PRBC transfusion     Remains volume overloaded on current Rx, slowly appears improving    Had been on bumex drip which was d/c'd 2/2 contraction alkalosis, s/p acetazolamide dosing, bicarb improved, 1/27 restarted on diuretics-> Lasix 80 IV q 8h -> Lasix drip at 30mg/hr, metolazone 10/d ->1/31 d/c'd as hypokalemia persists. Back on high dose oral loop diuretics, renal following, monitor renal function, electrolytes, bicarb, acetazolamide dosing again for contraction alkalosis   Still needs (more) potassium repletion.  On Toprol 25/d, add further GDMT for cardiomyopathy as BP and renal function allows.   +Lt UE DVT - on AC, cont on Xarelto, home med  cont with COPD management   Increased dyspnea with drop in O2 sat with physical therapy, limited standing time 2/2 LE weakness      Pt remains hypervolemic despite aggressive diuretic approach, +MARR. Recommend transfer for advanced heart therapeutics, both pt and agrees with the plan. Discussed with medicine team, awaiting medicine to medicine transfer to [facility].

## 2025-02-04 NOTE — PROGRESS NOTE ADULT - SUBJECTIVE AND OBJECTIVE BOX
Patient is a 79y old  Male who presents with a chief complaint of Acute on chronic combined HFpEF and HFrEF exacerbation, acute blood loss anemia secondary to suspected upper GI bleed (03 Feb 2025 21:10)      INTERVAL HPI/OVERNIGHT EVENTS:  Pt was seen and examined, no acute events.      MEDICATIONS  (STANDING):  acetaZOLAMIDE Injectable 250 milliGRAM(s) IV Push every 12 hours  atorvastatin 20 milliGRAM(s) Oral at bedtime  buMETAnide 2 milliGRAM(s) Oral every 12 hours  dextrose 5%. 1000 milliLiter(s) (50 mL/Hr) IV Continuous <Continuous>  dextrose 5%. 1000 milliLiter(s) (100 mL/Hr) IV Continuous <Continuous>  dextrose 50% Injectable 25 Gram(s) IV Push once  dextrose 50% Injectable 12.5 Gram(s) IV Push once  dextrose 50% Injectable 25 Gram(s) IV Push once  fluticasone propionate/ salmeterol 250-50 MICROgram(s) Diskus 1 Dose(s) Inhalation two times a day  glucagon  Injectable 1 milliGRAM(s) IntraMuscular once  glucagon  Injectable 1 milliGRAM(s) IntraMuscular once  influenza  Vaccine (HIGH DOSE) 0.5 milliLiter(s) IntraMuscular once  insulin glargine Injectable (LANTUS) 32 Unit(s) SubCutaneous at bedtime  insulin lispro (ADMELOG) corrective regimen sliding scale   SubCutaneous three times a day before meals  insulin lispro Injectable (ADMELOG) 3 Unit(s) SubCutaneous three times a day before meals  metoprolol succinate ER 25 milliGRAM(s) Oral daily  pantoprazole    Tablet 40 milliGRAM(s) Oral two times a day  potassium chloride    Tablet ER 30 milliEquivalent(s) Oral two times a day  rivaroxaban 15 milliGRAM(s) Oral with dinner  tamsulosin 0.4 milliGRAM(s) Oral at bedtime  tiotropium 2.5 MICROgram(s) Inhaler 2 Puff(s) Inhalation daily    MEDICATIONS  (PRN):  acetaminophen     Tablet .. 650 milliGRAM(s) Oral every 6 hours PRN Temp greater or equal to 38C (100.4F), Mild Pain (1 - 3)  albuterol/ipratropium for Nebulization.. 3 milliLiter(s) Inhalation every 6 hours PRN -for shortness of breath and/or wheezing  dextrose Oral Gel 15 Gram(s) Oral once PRN Blood Glucose LESS THAN 70 milliGRAM(s)/deciliter      Allergies  No Known Allergies      Vital Signs Last 24 Hrs  T(C): 36.8 (04 Feb 2025 10:08), Max: 37.1 (03 Feb 2025 15:59)  T(F): 98.2 (04 Feb 2025 10:08), Max: 98.8 (04 Feb 2025 04:56)  HR: 70 (04 Feb 2025 10:08) (70 - 84)  BP: 114/67 (04 Feb 2025 10:08) (110/63 - 134/71)  BP(mean): --  RR: 18 (04 Feb 2025 10:08) (18 - 18)  SpO2: 99% (04 Feb 2025 10:08) (91% - 99%)    Parameters below as of 04 Feb 2025 10:08  Patient On (Oxygen Delivery Method): nasal cannula  O2 Flow (L/min): 3      PHYSICAL EXAM:  GENERAL: NAD  HEAD:  Atraumatic   EYES: PERRLA  ENMT: Mouth moist   NECK: Supple  NERVOUS SYSTEM:  Awake, alert  CHEST/LUNG: Diminished , on NC  HEART: RRR, S1, S2  ABDOMEN: Soft, non tender  EXTREMITIES: 3+ BL LE  SKIN: No rash          LABS:                        9.5    8.42  )-----------( 197      ( 03 Feb 2025 06:18 )             33.3     02-04    137  |  92[L]  |  71[H]  ----------------------------<  86  3.3[L]   |  41[H]  |  2.44[H]    Ca    9.5      04 Feb 2025 08:47  Phos  3.5     02-03  Mg     2.0     02-03        Urinalysis Basic - ( 04 Feb 2025 08:47 )    Color: x / Appearance: x / SG: x / pH: x  Gluc: 86 mg/dL / Ketone: x  / Bili: x / Urobili: x   Blood: x / Protein: x / Nitrite: x   Leuk Esterase: x / RBC: x / WBC x   Sq Epi: x / Non Sq Epi: x / Bacteria: x      CAPILLARY BLOOD GLUCOSE      POCT Blood Glucose.: 136 mg/dL (04 Feb 2025 11:05)  POCT Blood Glucose.: 118 mg/dL (04 Feb 2025 07:49)  POCT Blood Glucose.: 155 mg/dL (03 Feb 2025 22:26)  POCT Blood Glucose.: 168 mg/dL (03 Feb 2025 20:52)  POCT Blood Glucose.: 143 mg/dL (03 Feb 2025 16:48)      RADIOLOGY & ADDITIONAL TESTS:    Imaging Personally Reviewed:  [ ] YES  [ ] NO    Consultant(s) Notes Reviewed:  [ ] YES  [ ] NO    Care Discussed with Consultants/Other Providers [ ] YES  [ ] NO

## 2025-02-04 NOTE — PROGRESS NOTE ADULT - ASSESSMENT
79M with H/O CAD /CABG,  HFrEF 25%, ICD,  c/c  Afib , COPD , on home o2,  HTN/ HLD. DM admitted with sob from  anemia requiring PRBC transfusion and  from acute on chronic  systolic chf, Ef 25, and diastolic  dysfunction, mod TR, mod  PTHn, Cardiomyopathy, S/P AICD, S/P bumex drip-> S/P Lasix 80 iv-> complicated 2/2 contraction alkalosis. and persistent hypokalemia.       Acute on chronic respiratory failure:  - Acute on chronic combined systolic and diastolic CHF  - S/P AICD, S/P bumex drip-> S/P Lasix 80 iv-> complicated 2/2 contraction alkalosis and persistent hypokalemia.   - Still volume overloaded restarted PO diuretic on wkend, now on Bumex 2 mg BID  - Standing KCl supplement daily   - VBG with met alk today, added diamox  - On Toprol 25/d, add further GDMT, SGLT2i/ MRA  - Card following  - Fluid and salt restriction, daily wt, I/O  - Pending Tx to [facility] for heart failure team evaluation   - On 3L o2 now    DENISHA on CKD stage 3:  - Contraction alkalosis , Hypokalemia  - renal following , Cr stable    Chronic Afib:  - on Xarelto  - Rate control with BB    Symptomatic anemia:  -  S/p transfusion, seen by GI    DM:  - A1c 7.2  - Continue current insulin regimen     Obesity, BMI  35  +Lt  brachial/  cephalic IV  phlebitis    Pt is DNR/I    Discussed with wife at  bedside  Discussed with cardio. pt has SOB with minimal exertion. Card recommended Tx to [facility] for heart failure consult.        79M with H/O CAD /CABG,  HFrEF 25%, ICD,  c/c  Afib , COPD , on home o2,  HTN/ HLD. DM admitted with sob from  anemia requiring PRBC transfusion and  from acute on chronic  systolic chf, Ef 25, and diastolic  dysfunction, mod TR, mod  PTHn, Cardiomyopathy, S/P AICD, S/P bumex drip-> S/P Lasix 80 iv-> complicated 2/2 contraction alkalosis. and persistent hypokalemia.       Acute on chronic respiratory failure:  - Acute on chronic combined systolic and diastolic CHF  - S/P AICD, S/P bumex drip-> S/P Lasix 80 iv-> complicated 2/2 contraction alkalosis and persistent hypokalemia.   - Still volume overloaded restarted PO diuretic on wkend, now on Bumex 2 mg BID  - Standing KCl supplement daily   - VBG with met alk today, added diamox  - On Toprol 25/d, add further GDMT, SGLT2i/ MRA  - Card following  - Fluid and salt restriction, daily wt, I/O  - Pending Tx to [facility] for heart failure team evaluation   - On 3L o2 now    DENISHA on CKD stage 3:  - Contraction alkalosis , Hypokalemia  - renal following , Cr stable    Chronic Afib:  - on Xarelto  - Rate control with BB    Symptomatic anemia:  -  S/p transfusion, seen by GI    DM:  - A1c 7.2  - Continue current insulin regimen     Obesity, BMI  35  SVT in left brachial and basilic vein.    Pt is DNR/I    Discussed with wife at  bedside  Discussed with cardio. pt has SOB with minimal exertion. Card recommended Tx to [facility] for heart failure consult.

## 2025-02-05 ENCOUNTER — INPATIENT (INPATIENT)
Facility: HOSPITAL | Age: 80
LOS: 67 days | Discharge: NURSING, REHAB OR LONG-TERM CARE | DRG: 293 | End: 2025-04-14
Attending: STUDENT IN AN ORGANIZED HEALTH CARE EDUCATION/TRAINING PROGRAM | Admitting: STUDENT IN AN ORGANIZED HEALTH CARE EDUCATION/TRAINING PROGRAM
Payer: MEDICARE

## 2025-02-05 VITALS
OXYGEN SATURATION: 92 % | TEMPERATURE: 98 F | RESPIRATION RATE: 20 BRPM | DIASTOLIC BLOOD PRESSURE: 63 MMHG | HEART RATE: 70 BPM | SYSTOLIC BLOOD PRESSURE: 109 MMHG

## 2025-02-05 VITALS
RESPIRATION RATE: 18 BRPM | TEMPERATURE: 99 F | SYSTOLIC BLOOD PRESSURE: 120 MMHG | DIASTOLIC BLOOD PRESSURE: 63 MMHG | OXYGEN SATURATION: 92 % | HEART RATE: 70 BPM

## 2025-02-05 DIAGNOSIS — Z29.9 ENCOUNTER FOR PROPHYLACTIC MEASURES, UNSPECIFIED: ICD-10-CM

## 2025-02-05 DIAGNOSIS — I50.9 HEART FAILURE, UNSPECIFIED: ICD-10-CM

## 2025-02-05 DIAGNOSIS — J44.9 CHRONIC OBSTRUCTIVE PULMONARY DISEASE, UNSPECIFIED: ICD-10-CM

## 2025-02-05 DIAGNOSIS — I48.20 CHRONIC ATRIAL FIBRILLATION, UNSPECIFIED: ICD-10-CM

## 2025-02-05 DIAGNOSIS — D64.9 ANEMIA, UNSPECIFIED: ICD-10-CM

## 2025-02-05 DIAGNOSIS — Z95.1 PRESENCE OF AORTOCORONARY BYPASS GRAFT: Chronic | ICD-10-CM

## 2025-02-05 DIAGNOSIS — Z95.810 PRESENCE OF AUTOMATIC (IMPLANTABLE) CARDIAC DEFIBRILLATOR: Chronic | ICD-10-CM

## 2025-02-05 DIAGNOSIS — N40.0 BENIGN PROSTATIC HYPERPLASIA WITHOUT LOWER URINARY TRACT SYMPTOMS: ICD-10-CM

## 2025-02-05 DIAGNOSIS — N18.4 CHRONIC KIDNEY DISEASE, STAGE 4 (SEVERE): ICD-10-CM

## 2025-02-05 DIAGNOSIS — J96.11 CHRONIC RESPIRATORY FAILURE WITH HYPOXIA: ICD-10-CM

## 2025-02-05 DIAGNOSIS — E11.9 TYPE 2 DIABETES MELLITUS WITHOUT COMPLICATIONS: ICD-10-CM

## 2025-02-05 DIAGNOSIS — Z98.89 OTHER SPECIFIED POSTPROCEDURAL STATES: Chronic | ICD-10-CM

## 2025-02-05 DIAGNOSIS — E78.5 HYPERLIPIDEMIA, UNSPECIFIED: ICD-10-CM

## 2025-02-05 DIAGNOSIS — I10 ESSENTIAL (PRIMARY) HYPERTENSION: ICD-10-CM

## 2025-02-05 DIAGNOSIS — I50.23 ACUTE ON CHRONIC SYSTOLIC (CONGESTIVE) HEART FAILURE: ICD-10-CM

## 2025-02-05 DIAGNOSIS — N17.9 ACUTE KIDNEY FAILURE, UNSPECIFIED: ICD-10-CM

## 2025-02-05 DIAGNOSIS — I25.10 ATHEROSCLEROTIC HEART DISEASE OF NATIVE CORONARY ARTERY WITHOUT ANGINA PECTORIS: ICD-10-CM

## 2025-02-05 LAB
ALBUMIN SERPL ELPH-MCNC: 3 G/DL — LOW (ref 3.3–5)
ALP SERPL-CCNC: 298 U/L — HIGH (ref 40–120)
ALT FLD-CCNC: 24 U/L — SIGNIFICANT CHANGE UP (ref 12–78)
ANION GAP SERPL CALC-SCNC: 3 MMOL/L — LOW (ref 5–17)
AST SERPL-CCNC: 41 U/L — HIGH (ref 15–37)
BASE EXCESS BLDV CALC-SCNC: 20.5 MMOL/L — HIGH (ref -2–3)
BILIRUB SERPL-MCNC: 1 MG/DL — SIGNIFICANT CHANGE UP (ref 0.2–1.2)
BLOOD GAS COMMENTS, VENOUS: SIGNIFICANT CHANGE UP
BUN SERPL-MCNC: 68 MG/DL — HIGH (ref 7–23)
CALCIUM SERPL-MCNC: 9.4 MG/DL — SIGNIFICANT CHANGE UP (ref 8.5–10.1)
CHLORIDE SERPL-SCNC: 91 MMOL/L — LOW (ref 96–108)
CO2 BLDV-SCNC: 49 MMOL/L — CRITICAL HIGH (ref 22–26)
CO2 SERPL-SCNC: 43 MMOL/L — HIGH (ref 22–31)
CREAT SERPL-MCNC: 2.52 MG/DL — HIGH (ref 0.5–1.3)
EGFR: 25 ML/MIN/1.73M2 — LOW
GAS PNL BLDV: SIGNIFICANT CHANGE UP
GLUCOSE BLDC GLUCOMTR-MCNC: 117 MG/DL — HIGH (ref 70–99)
GLUCOSE BLDC GLUCOMTR-MCNC: 121 MG/DL — HIGH (ref 70–99)
GLUCOSE BLDC GLUCOMTR-MCNC: 162 MG/DL — HIGH (ref 70–99)
GLUCOSE BLDC GLUCOMTR-MCNC: 225 MG/DL — HIGH (ref 70–99)
GLUCOSE SERPL-MCNC: 111 MG/DL — HIGH (ref 70–99)
HCO3 BLDV-SCNC: 47 MMOL/L — CRITICAL HIGH (ref 22–28)
HCT VFR BLD CALC: 35.1 % — LOW (ref 39–50)
HGB BLD-MCNC: 9.9 G/DL — LOW (ref 13–17)
HOROWITZ INDEX BLDV+IHG-RTO: 21 — SIGNIFICANT CHANGE UP
MAGNESIUM SERPL-MCNC: 2.2 MG/DL — SIGNIFICANT CHANGE UP (ref 1.6–2.6)
MCHC RBC-ENTMCNC: 26.3 PG — LOW (ref 27–34)
MCHC RBC-ENTMCNC: 28.2 G/DL — LOW (ref 32–36)
MCV RBC AUTO: 93.1 FL — SIGNIFICANT CHANGE UP (ref 80–100)
NRBC # BLD: 0 /100 WBCS — SIGNIFICANT CHANGE UP (ref 0–0)
NRBC BLD-RTO: 0 /100 WBCS — SIGNIFICANT CHANGE UP (ref 0–0)
PCO2 BLDV: 59 MMHG — HIGH (ref 42–55)
PH BLDV: 7.51 — HIGH (ref 7.32–7.43)
PLATELET # BLD AUTO: 216 K/UL — SIGNIFICANT CHANGE UP (ref 150–400)
PO2 BLDV: 69 MMHG — HIGH (ref 25–45)
POTASSIUM SERPL-MCNC: 3.2 MMOL/L — LOW (ref 3.5–5.3)
POTASSIUM SERPL-MCNC: 3.6 MMOL/L — SIGNIFICANT CHANGE UP (ref 3.5–5.3)
POTASSIUM SERPL-SCNC: 3.2 MMOL/L — LOW (ref 3.5–5.3)
POTASSIUM SERPL-SCNC: 3.6 MMOL/L — SIGNIFICANT CHANGE UP (ref 3.5–5.3)
PROT SERPL-MCNC: 7.3 GM/DL — SIGNIFICANT CHANGE UP (ref 6–8.3)
RBC # BLD: 3.77 M/UL — LOW (ref 4.2–5.8)
RBC # FLD: 25.3 % — HIGH (ref 10.3–14.5)
SAO2 % BLDV: 95.7 % — SIGNIFICANT CHANGE UP (ref 94–98)
SODIUM SERPL-SCNC: 137 MMOL/L — SIGNIFICANT CHANGE UP (ref 135–145)
WBC # BLD: 10.14 K/UL — SIGNIFICANT CHANGE UP (ref 3.8–10.5)
WBC # FLD AUTO: 10.14 K/UL — SIGNIFICANT CHANGE UP (ref 3.8–10.5)

## 2025-02-05 PROCEDURE — 99232 SBSQ HOSP IP/OBS MODERATE 35: CPT

## 2025-02-05 PROCEDURE — 99233 SBSQ HOSP IP/OBS HIGH 50: CPT

## 2025-02-05 PROCEDURE — 99223 1ST HOSP IP/OBS HIGH 75: CPT

## 2025-02-05 RX ORDER — GLUCAGON 3 MG/1
1 POWDER NASAL ONCE
Refills: 0 | Status: DISCONTINUED | OUTPATIENT
Start: 2025-02-05 | End: 2025-04-14

## 2025-02-05 RX ORDER — POTASSIUM CHLORIDE 750 MG/1
10 TABLET, EXTENDED RELEASE ORAL
Refills: 0 | Status: COMPLETED | OUTPATIENT
Start: 2025-02-05 | End: 2025-02-05

## 2025-02-05 RX ORDER — ACETAZOLAMIDE 250 MG/1
250 TABLET ORAL
Refills: 0 | Status: DISCONTINUED | OUTPATIENT
Start: 2025-02-05 | End: 2025-02-06

## 2025-02-05 RX ORDER — MELATONIN 5 MG
3 TABLET ORAL AT BEDTIME
Refills: 0 | Status: DISCONTINUED | OUTPATIENT
Start: 2025-02-05 | End: 2025-04-14

## 2025-02-05 RX ORDER — INSULIN LISPRO 100 U/ML
INJECTION, SOLUTION INTRAVENOUS; SUBCUTANEOUS
Refills: 0 | Status: DISCONTINUED | OUTPATIENT
Start: 2025-02-05 | End: 2025-04-14

## 2025-02-05 RX ORDER — TIOTROPIUM BROMIDE MONOHYDRATE 18 UG/1
2 CAPSULE ORAL; RESPIRATORY (INHALATION)
Qty: 0 | Refills: 0 | DISCHARGE
Start: 2025-02-05

## 2025-02-05 RX ORDER — ASPIRIN 325 MG
1 TABLET ORAL
Refills: 0 | DISCHARGE

## 2025-02-05 RX ORDER — INSULIN LISPRO 100/ML
3 VIAL (ML) SUBCUTANEOUS
Qty: 0 | Refills: 0 | DISCHARGE
Start: 2025-02-05

## 2025-02-05 RX ORDER — PANTOPRAZOLE 20 MG/1
1 TABLET, DELAYED RELEASE ORAL
Qty: 0 | Refills: 0 | DISCHARGE
Start: 2025-02-05

## 2025-02-05 RX ORDER — ATORVASTATIN CALCIUM 80 MG/1
20 TABLET, FILM COATED ORAL AT BEDTIME
Refills: 0 | Status: DISCONTINUED | OUTPATIENT
Start: 2025-02-05 | End: 2025-04-14

## 2025-02-05 RX ORDER — TIOTROPIUM BROMIDE INHALATION SPRAY 3.12 UG/1
2 SPRAY, METERED RESPIRATORY (INHALATION) DAILY
Refills: 0 | Status: DISCONTINUED | OUTPATIENT
Start: 2025-02-05 | End: 2025-04-14

## 2025-02-05 RX ORDER — DEXTROSE 50 % IN WATER 50 %
25 SYRINGE (ML) INTRAVENOUS ONCE
Refills: 0 | Status: DISCONTINUED | OUTPATIENT
Start: 2025-02-05 | End: 2025-04-14

## 2025-02-05 RX ORDER — BUMETANIDE 2 MG/1
1 TABLET ORAL
Qty: 0 | Refills: 0 | DISCHARGE
Start: 2025-02-05

## 2025-02-05 RX ORDER — TAMSULOSIN HYDROCHLORIDE 0.4 MG/1
0.4 CAPSULE ORAL AT BEDTIME
Refills: 0 | Status: DISCONTINUED | OUTPATIENT
Start: 2025-02-05 | End: 2025-04-14

## 2025-02-05 RX ORDER — IPRATROPIUM BROMIDE AND ALBUTEROL SULFATE .5; 2.5 MG/3ML; MG/3ML
3 SOLUTION RESPIRATORY (INHALATION)
Qty: 0 | Refills: 0 | DISCHARGE
Start: 2025-02-05

## 2025-02-05 RX ORDER — RIVAROXABAN 10 MG/1
15 TABLET, FILM COATED ORAL
Refills: 0 | Status: DISCONTINUED | OUTPATIENT
Start: 2025-02-05 | End: 2025-03-13

## 2025-02-05 RX ORDER — FLUTICASONE PROPIONATE AND SALMETEROL 113; 14 UG/1; UG/1
2 POWDER, METERED RESPIRATORY (INHALATION)
Qty: 0 | Refills: 0 | DISCHARGE
Start: 2025-02-05

## 2025-02-05 RX ORDER — IPRATROPIUM BROMIDE AND ALBUTEROL SULFATE .5; 2.5 MG/3ML; MG/3ML
3 SOLUTION RESPIRATORY (INHALATION) EVERY 6 HOURS
Refills: 0 | Status: DISCONTINUED | OUTPATIENT
Start: 2025-02-05 | End: 2025-04-05

## 2025-02-05 RX ORDER — BUDESONIDE AND FORMOTEROL FUMARATE DIHYDRATE 80; 4.5 UG/1; UG/1
2 AEROSOL RESPIRATORY (INHALATION)
Refills: 0 | DISCHARGE

## 2025-02-05 RX ORDER — INSULIN GLARGINE-YFGN 100 [IU]/ML
32 INJECTION, SOLUTION SUBCUTANEOUS AT BEDTIME
Refills: 0 | Status: DISCONTINUED | OUTPATIENT
Start: 2025-02-06 | End: 2025-02-19

## 2025-02-05 RX ORDER — METOPROLOL SUCCINATE 50 MG/1
50 TABLET, EXTENDED RELEASE ORAL DAILY
Refills: 0 | Status: DISCONTINUED | OUTPATIENT
Start: 2025-02-05 | End: 2025-02-11

## 2025-02-05 RX ORDER — ACETAMINOPHEN 160 MG/5ML
2 SUSPENSION ORAL
Qty: 0 | Refills: 0 | DISCHARGE
Start: 2025-02-05

## 2025-02-05 RX ORDER — DEXTROSE 50 % IN WATER 50 %
12.5 SYRINGE (ML) INTRAVENOUS ONCE
Refills: 0 | Status: DISCONTINUED | OUTPATIENT
Start: 2025-02-05 | End: 2025-04-14

## 2025-02-05 RX ORDER — INSULIN GLARGINE-YFGN 100 [IU]/ML
32 INJECTION, SOLUTION SUBCUTANEOUS ONCE
Refills: 0 | Status: COMPLETED | OUTPATIENT
Start: 2025-02-05 | End: 2025-02-05

## 2025-02-05 RX ORDER — INSULIN LISPRO 100/ML
1 VIAL (ML) SUBCUTANEOUS
Qty: 0 | Refills: 0 | DISCHARGE
Start: 2025-02-05

## 2025-02-05 RX ORDER — BUMETANIDE 1 MG/1
2 TABLET ORAL
Refills: 0 | Status: DISCONTINUED | OUTPATIENT
Start: 2025-02-05 | End: 2025-02-06

## 2025-02-05 RX ORDER — INSULIN LISPRO 100 U/ML
3 INJECTION, SOLUTION INTRAVENOUS; SUBCUTANEOUS
Refills: 0 | Status: DISCONTINUED | OUTPATIENT
Start: 2025-02-05 | End: 2025-04-14

## 2025-02-05 RX ORDER — DEXTROSE 50 % IN WATER 50 %
15 SYRINGE (ML) INTRAVENOUS ONCE
Refills: 0 | Status: DISCONTINUED | OUTPATIENT
Start: 2025-02-05 | End: 2025-04-14

## 2025-02-05 RX ORDER — POTASSIUM CHLORIDE 750 MG/1
2 TABLET, EXTENDED RELEASE ORAL
Qty: 0 | Refills: 0 | DISCHARGE
Start: 2025-02-05

## 2025-02-05 RX ORDER — ACETAMINOPHEN 500 MG/5ML
650 LIQUID (ML) ORAL EVERY 6 HOURS
Refills: 0 | Status: DISCONTINUED | OUTPATIENT
Start: 2025-02-05 | End: 2025-04-14

## 2025-02-05 RX ORDER — SODIUM CHLORIDE 9 G/1000ML
1000 INJECTION, SOLUTION INTRAVENOUS
Refills: 0 | Status: DISCONTINUED | OUTPATIENT
Start: 2025-02-05 | End: 2025-04-14

## 2025-02-05 RX ORDER — INSULIN GLARGINE-YFGN 100 [IU]/ML
32 INJECTION, SOLUTION SUBCUTANEOUS
Qty: 0 | Refills: 0 | DISCHARGE
Start: 2025-02-05

## 2025-02-05 RX ORDER — METOPROLOL SUCCINATE 25 MG
1 TABLET, EXTENDED RELEASE 24 HR ORAL
Qty: 0 | Refills: 0 | DISCHARGE
Start: 2025-02-05

## 2025-02-05 RX ORDER — ATORVASTATIN CALCIUM 80 MG/1
1 TABLET, FILM COATED ORAL
Qty: 0 | Refills: 0 | DISCHARGE
Start: 2025-02-05

## 2025-02-05 RX ADMIN — TIOTROPIUM BROMIDE MONOHYDRATE 2 PUFF(S): 18 CAPSULE ORAL; RESPIRATORY (INHALATION) at 11:29

## 2025-02-05 RX ADMIN — RIVAROXABAN 15 MILLIGRAM(S): 20 TABLET, FILM COATED ORAL at 17:20

## 2025-02-05 RX ADMIN — POTASSIUM CHLORIDE 100 MILLIEQUIVALENT(S): 750 TABLET, EXTENDED RELEASE ORAL at 16:55

## 2025-02-05 RX ADMIN — Medication 1: at 11:28

## 2025-02-05 RX ADMIN — Medication 250 MILLIGRAM(S): at 17:16

## 2025-02-05 RX ADMIN — Medication 3 UNIT(S): at 17:16

## 2025-02-05 RX ADMIN — POTASSIUM CHLORIDE 100 MILLIEQUIVALENT(S): 750 TABLET, EXTENDED RELEASE ORAL at 17:58

## 2025-02-05 RX ADMIN — Medication 250 MILLIGRAM(S): at 01:13

## 2025-02-05 RX ADMIN — Medication 3 UNIT(S): at 07:58

## 2025-02-05 RX ADMIN — Medication 25 MILLIGRAM(S): at 05:49

## 2025-02-05 RX ADMIN — BUMETANIDE 2 MILLIGRAM(S): 2 TABLET ORAL at 05:48

## 2025-02-05 RX ADMIN — PANTOPRAZOLE 40 MILLIGRAM(S): 20 TABLET, DELAYED RELEASE ORAL at 17:17

## 2025-02-05 RX ADMIN — FLUTICASONE PROPIONATE AND SALMETEROL 1 DOSE(S): 113; 14 POWDER, METERED RESPIRATORY (INHALATION) at 17:20

## 2025-02-05 RX ADMIN — POTASSIUM CHLORIDE 30 MILLIEQUIVALENT(S): 750 TABLET, EXTENDED RELEASE ORAL at 17:17

## 2025-02-05 RX ADMIN — Medication 3 UNIT(S): at 11:28

## 2025-02-05 RX ADMIN — FLUTICASONE PROPIONATE AND SALMETEROL 1 DOSE(S): 113; 14 POWDER, METERED RESPIRATORY (INHALATION) at 05:48

## 2025-02-05 RX ADMIN — BUMETANIDE 2 MILLIGRAM(S): 2 TABLET ORAL at 17:17

## 2025-02-05 RX ADMIN — POTASSIUM CHLORIDE 100 MILLIEQUIVALENT(S): 750 TABLET, EXTENDED RELEASE ORAL at 15:33

## 2025-02-05 RX ADMIN — PANTOPRAZOLE 40 MILLIGRAM(S): 20 TABLET, DELAYED RELEASE ORAL at 05:49

## 2025-02-05 RX ADMIN — POTASSIUM CHLORIDE 30 MILLIEQUIVALENT(S): 750 TABLET, EXTENDED RELEASE ORAL at 05:48

## 2025-02-05 NOTE — DISCHARGE NOTE PROVIDER - NSDCMRMEDTOKEN_GEN_ALL_CORE_FT
acetaminophen 325 mg oral tablet: 2 tab(s) orally every 6 hours As needed Temp greater or equal to 38C (100.4F), Mild Pain (1 - 3)  Admelog 100 units/mL injectable solution: 1 unspecified injectable 3 times a day (with meals) 1 Unit(s) if Glucose 151 - 200  2 Unit(s) if Glucose 201 - 250  3 Unit(s) if Glucose 251 - 300  4 Unit(s) if Glucose 301 - 350  5 Unit(s) if Glucose 351 - 400  6 Unit(s) if Glucose Greater Than 400  atorvastatin 20 mg oral tablet: 1 tab(s) orally once a day (at bedtime)  bumetanide 2 mg oral tablet: 1 tab(s) orally every 12 hours  fluticasone-salmeterol: 2 inhaler(s) inhaled 2 times a day  insulin glargine 100 units/mL subcutaneous solution: 32 unit(s) subcutaneous once a day (at bedtime)  insulin lispro 100 units/mL injectable solution: 3 unit(s) injectable 3 times a day (with meals)  ipratropium-albuterol 0.5 mg-2.5 mg/3 mL inhalation solution: 3 milliliter(s) inhaled every 6 hours As needed -for shortness of breath and/or wheezing  metoprolol succinate 25 mg oral tablet, extended release: 1 tab(s) orally once a day  pantoprazole 40 mg oral delayed release tablet: 1 tab(s) orally 2 times a day  potassium chloride 15 mEq oral tablet, extended release: 2 tab(s) orally 2 times a day  rivaroxaban 15 mg oral tablet: 1 tab(s) orally once a day (before a meal)  tamsulosin 0.4 mg oral capsule: 1 cap(s) orally once a day (at bedtime)  tiotropium 2.5 mcg/inh inhalation aerosol: 2 puff(s) inhaled once a day

## 2025-02-05 NOTE — H&P ADULT - PROBLEM SELECTOR PLAN 2
- Baseline Cr in August 1.5-1.81  - Cr in [place] this admission ranged from 1.93 -  3.27, most recently 2.52 today  - Likely DENISHA in setting of cardiorenal syndrome with contraction alkalosis as well  - Continue monitoring Cr and urine output - Baseline Cr in August 1.5-1.81  - Cr in [place] this admission ranged from 1.93 -  3.27, most recently 2.52 today  - Likely DENISHA in setting of cardiorenal syndrome with contraction alkalosis as well  - Continue monitoring Cr and urine output  - Nephrology consult in AM

## 2025-02-05 NOTE — PROGRESS NOTE ADULT - ASSESSMENT
79M with H/O CAD /CABG,  HFrEF 25%, ICD,  c/c  Afib , COPD , on home o2,  HTN/ HLD. DM admitted with sob from  anemia requiring PRBC transfusion and  from acute on chronic  systolic chf, Ef 25, and diastolic  dysfunction, mod TR, mod  PTHn, Cardiomyopathy, S/P AICD, S/P bumex drip-> S/P Lasix 80 iv-> complicated 2/2 contraction alkalosis. and persistent hypokalemia.       Acute on chronic respiratory failure:  - Acute on chronic combined systolic and diastolic CHF  - S/P AICD, S/P bumex drip-> S/P Lasix 80 iv-> complicated 2/2 contraction alkalosis and persistent hypokalemia.   - Still volume overloaded restarted PO diuretic on wkend, now on Bumex 2 mg BID  - Standing KCl supplement daily   - VBG with met alk today, added diamox  - On Toprol 25/d, add further GDMT, SGLT2i/ MRA  - Card following  - Fluid and salt restriction, daily wt, I/O  - Pending Tx to [facility] for heart failure team evaluation   - On 3L o2 now    DENISHA on CKD stage 3:  - Contraction alkalosis , Hypokalemia  - renal following , Cr stable    Chronic Afib:  - on Xarelto  - Rate control with BB    Symptomatic anemia:  -  S/p transfusion, seen by GI    DM:  - A1c 7.2  - Continue current insulin regimen     Obesity, BMI  35  SVT in left brachial and basilic vein.    Pt is DNR/I    Discussed with wife at  bedside  Discussed with cardio. pt has SOB with minimal exertion. Card recommended Tx to [facility] for heart failure consult.

## 2025-02-05 NOTE — PATIENT PROFILE ADULT - FALL HARM RISK - HARM RISK INTERVENTIONS

## 2025-02-05 NOTE — H&P ADULT - NSHPPHYSICALEXAM_GEN_ALL_CORE
Vital Signs Last 24 Hrs  T(C): 36.4 (05 Feb 2025 19:26), Max: 36.4 (05 Feb 2025 05:04)  T(F): 97.6 (05 Feb 2025 19:26), Max: 97.6 (05 Feb 2025 19:26)  HR: 70 (05 Feb 2025 19:26) (67 - 73)  BP: 109/63 (05 Feb 2025 19:26) (109/63 - 125/77)  BP(mean): --  RR: 20 (05 Feb 2025 19:26) (17 - 20)  SpO2: 92% (05 Feb 2025 19:26) (92% - 100%)        CONSTITUTIONAL: Well-groomed, in no apparent distress  EYES: No conjunctival or scleral injection, non-icteric;   NECK: Trachea midline without palpable neck mass  RESPIRATORY: Breathing comfortably; lungs CTA without wheeze/rhonchi/rales  CARDIOVASCULAR: +S1S2, RRR, no M/G/R; no lower extremity edema  GASTROINTESTINAL: No palpable masses or tenderness, +BS throughout, no rebound/guarding  SKIN: No rashes or ulcers noted  NEUROLOGIC: Sensation intact in LEs b/l to light touch  PSYCHIATRIC: A+O x 3; mood and affect appropriate; appropriate insight and judgment Vital Signs Last 24 Hrs  T(C): 36.4 (05 Feb 2025 19:26), Max: 36.4 (05 Feb 2025 05:04)  T(F): 97.6 (05 Feb 2025 19:26), Max: 97.6 (05 Feb 2025 19:26)  HR: 70 (05 Feb 2025 19:26) (67 - 73)  BP: 109/63 (05 Feb 2025 19:26) (109/63 - 125/77)  BP(mean): --  RR: 20 (05 Feb 2025 19:26) (17 - 20)  SpO2: 92% (05 Feb 2025 19:26) (92% - 100%)        CONSTITUTIONAL: Well-groomed, in no apparent distress  EYES: No conjunctival or scleral injection, non-icteric;   NECK: Trachea midline without palpable neck mass  RESPIRATORY: Breathing comfortably on 4LNC; lungs CTA without wheeze/rhonchi/rales but auscultation limited by positioning and habitus  CARDIOVASCULAR: +S1S2, RRR, no M/G/R; 3+ LE pitting edema bilaterally  GASTROINTESTINAL: No palpable masses or tenderness, +BS throughout, no rebound/guarding  SKIN: No rashes or ulcers noted  NEUROLOGIC: Sensation intact in LEs b/l to light touch  PSYCHIATRIC: Awake, alert, oriented to person, not to place or time Vital Signs Last 24 Hrs  T(C): 36.4 (05 Feb 2025 19:26), Max: 36.4 (05 Feb 2025 05:04)  T(F): 97.6 (05 Feb 2025 19:26), Max: 97.6 (05 Feb 2025 19:26)  HR: 70 (05 Feb 2025 19:26) (67 - 73)  BP: 109/63 (05 Feb 2025 19:26) (109/63 - 125/77)  BP(mean): --  RR: 20 (05 Feb 2025 19:26) (17 - 20)  SpO2: 92% (05 Feb 2025 19:26) (92% - 100%)        CONSTITUTIONAL: Well-groomed, in no apparent distress  EYES: No conjunctival or scleral injection, non-icteric;   NECK: Trachea midline without palpable neck mass  RESPIRATORY: Breathing comfortably on 4LNC; lungs CTA without wheeze/rhonchi/rales but auscultation limited by positioning and habitus  CARDIOVASCULAR: +S1S2, RRR, no M/G/R; 3+ LE pitting edema bilaterally  GASTROINTESTINAL: No palpable masses or tenderness, Abdomen distended, +BS throughout, no rebound/guarding  SKIN: Multiple small pinprick wounds noted  MSK: LUE w/ nonpitting edema  NEUROLOGIC: Sensation intact in LEs b/l to light touch  PSYCHIATRIC: Awake, alert, oriented to person, not to place or time

## 2025-02-05 NOTE — H&P ADULT - HISTORY OF PRESENT ILLNESS
This is an 80 y/o male w/ PMHx CAD s/p CABG, HF (combined HFrEF [EF 25%] and HFpEF [G3DD]) w/ ICD, AFib on Xarelto, severe AS s/p TAVR, COPD on 3-4L home O2, CKD3, HTN, HLD, T2DM, and BPH who presents as a transfer from [facility] for HF evaluation. He presented to [place] on 1/10/25 for worsening of his chronic SOB for 2 weeks, with associated increased swelling of legs and abdomen. He was admitted for acute on chronic hypoxic respiratory failure due to both anemia and acute on chronic CHF exacerbation. He was anemic to 6.5 on presentation there and hypoxic requiring Bipap. He was started on bumex drip, then transitioned to IVP lasix 80mg BID, then had to be transitioned to diamox due to his course being complicated by contraction alkalosis and hypokalemia, and then had Bumex 2mg BID started as he was still volume overloaded. He is now back to his baseline O2 requirements and was transferred here for evaluation by HF team per recommendation by cardiology at [facility] due to his persistent SOB with minimal exertion.  This is an 80 y/o male w/ PMHx CAD s/p CABG, HF (combined HFrEF [EF 25%] and HFpEF [G3DD]) w/ ICD, AFib on Xarelto, severe AS s/p TAVR, COPD on 3-4L home O2, CKD3, HTN, HLD, T2DM, and BPH who presents as a transfer from [facility] for HF evaluation. He presented to [place] on 1/10/25 for worsening of his chronic SOB for 2 weeks, with associated increased swelling of legs and abdomen. He was admitted for acute on chronic hypoxic respiratory failure due to both anemia and acute on chronic CHF exacerbation. He was anemic to 6.5 on presentation there and hypoxic requiring Bipap. He was started on bumex drip, then transitioned to IVP lasix 80mg BID, then had to be transitioned to diamox due to his course being complicated by contraction alkalosis and hypokalemia, and then had Bumex 2mg BID started as he was still volume overloaded. He is now back to his baseline O2 requirements and was transferred here for evaluation by HF team per recommendation by cardiology at [facility] due to his persistent SOB with minimal exertion.     Here he is afebrile, hemodynamically stable, saturating 92% on 3LNC. States that he isn't SOB at the moment. Says his legs are still swollen but they are much better than when he was admitted to [facility].    This is an 80 y/o male w/ PMHx CAD s/p CABG, HF (combined HFrEF [EF 25%] and HFpEF [G3DD]) w/ ICD, AFib on Xarelto, severe AS s/p TAVR, COPD on 3-4L home O2, CKD3, HTN, HLD, T2DM, and BPH who presents as a transfer from [facility] for HF evaluation. He presented to [place] on 1/10/25 for worsening of his chronic SOB for 2 weeks, with associated increased swelling of legs and abdomen. He was admitted for acute on chronic hypoxic respiratory failure due to both anemia and acute on chronic CHF exacerbation. He was anemic to 6.5 on presentation there and hypoxic requiring Bipap. He was started on bumex drip, then transitioned to IVP lasix 80mg BID, then had to be transitioned to diamox due to his course being complicated by contraction alkalosis and hypokalemia. Then had Bumex 2mg BID started as he was still volume overloaded. He is now back to his baseline O2 requirements and was transferred here for evaluation by HF team per recommendation by cardiology at [facility] due to his persistent SOB with minimal exertion.     Here he is afebrile, hemodynamically stable, saturating 92% on 3LNC. States that he isn't feeling any SOB at the moment. Says his legs are still swollen but they are much better than when he was admitted to [facility].

## 2025-02-05 NOTE — PROGRESS NOTE ADULT - NS ATTEND AMEND GEN_ALL_CORE FT
Patient remains markedly fluid overloaded. Recommend to start furosemide gtt.  Consider HD, will discuss with renal.
Care and management d/w heart failure team yesterday, awaiting transfer for HF consultation.  Bumex increased back up to 2 mg BID, still hypokalemic getting repleted; despite negative balance, patient still quite overloaded.
cont diuresis to aid with volume management  slowly but surely improving
cont diuresis to aid with volume management, hold in the setting of metabolic alkalosis  legs soft today, still with abd wall edema
Incomplete diuresis despitre attempts at aggressive diuresis that was ineffective.  Recommend transfer for advanced heart therapeutics if wife agrees - d/w dr. Blanc, will reach out to HF team at [facility].
cont diuresis to aid with volume management
cont diuresis to aid with volume management, hold in the setting of metabolic alkalosis  legs soft today, still with abd wall edema  contraction alkalosis improving
I have reviewed the examination and notes written by nurse practitioner and have made amendments where needed. We have discussed in detail the findings and plans for this patient.
personally saw and examined pt  labs and vitals reviewed  agree with above assessment and plan   remains grossly overloaded  cont aggressive diuresis  replete lytes as tolerated  check bmp twice a day until lasix drip is off  if UOP drops or pt becomes hypotensive, call ICU for inotrope assisted diuresis. will likely need transfer to [facility] at that time for CCU and HF support  will follow with you
personally saw and examined pt  labs and vitals reviewed  agree with above assessment and plan   remains grossly overloaded  cont aggressive diuresis  replete lytes as tolerated  check bmp twice a day until lasix drip is off  if UOP drops or pt becomes hypotensive, call ICU for inotrope assisted diuresis. will likely need transfer to [facility] at that time for CCU and HF support  will follow with you
personally saw and examined pt  labs and vitals reviewed  agree with above assessment and plan   remains grossly overloaded  now on lasix drip   check lytes BID and replete as required!  dvt mgmt per primary team  consider ICU eval and/or transfer for advanced HF mgmt if volume status does no significantly improve on lasix drip. or if unable to further adequately diurese  will follow with you  cont tele

## 2025-02-05 NOTE — PROGRESS NOTE ADULT - NS ATTEND OPT1 GEN_ALL_CORE
I independently performed the documented:
I attest my time as attending is greater than 50% of the total combined time spent on qualifying patient care activities by the PA/NP and attending.
I independently performed the documented:
I attest my time as attending is greater than 50% of the total combined time spent on qualifying patient care activities by the PA/NP and attending.
I independently performed the documented:

## 2025-02-05 NOTE — PROGRESS NOTE ADULT - SUBJECTIVE AND OBJECTIVE BOX
Patient is a 79y old  Male who presents with a chief complaint of Acute on chronic combined HFpEF and HFrEF exacerbation, acute blood loss anemia secondary to suspected upper GI bleed (05 Feb 2025 13:49)      INTERVAL HPI/OVERNIGHT EVENTS: Overnight no acute events. No complaints this AM. Wife at bedside updated. Pending bed for transfer to [facility].     MEDICATIONS  (STANDING):  acetaZOLAMIDE Injectable 250 milliGRAM(s) IV Push every 12 hours  atorvastatin 20 milliGRAM(s) Oral at bedtime  buMETAnide 2 milliGRAM(s) Oral every 12 hours  dextrose 5%. 1000 milliLiter(s) (100 mL/Hr) IV Continuous <Continuous>  dextrose 5%. 1000 milliLiter(s) (50 mL/Hr) IV Continuous <Continuous>  dextrose 50% Injectable 25 Gram(s) IV Push once  dextrose 50% Injectable 12.5 Gram(s) IV Push once  dextrose 50% Injectable 25 Gram(s) IV Push once  fluticasone propionate/ salmeterol 250-50 MICROgram(s) Diskus 1 Dose(s) Inhalation two times a day  glucagon  Injectable 1 milliGRAM(s) IntraMuscular once  glucagon  Injectable 1 milliGRAM(s) IntraMuscular once  influenza  Vaccine (HIGH DOSE) 0.5 milliLiter(s) IntraMuscular once  insulin glargine Injectable (LANTUS) 32 Unit(s) SubCutaneous at bedtime  insulin lispro (ADMELOG) corrective regimen sliding scale   SubCutaneous three times a day before meals  insulin lispro Injectable (ADMELOG) 3 Unit(s) SubCutaneous three times a day before meals  metoprolol succinate ER 25 milliGRAM(s) Oral daily  pantoprazole    Tablet 40 milliGRAM(s) Oral two times a day  potassium chloride    Tablet ER 30 milliEquivalent(s) Oral two times a day  potassium chloride  10 mEq/100 mL IVPB 10 milliEquivalent(s) IV Intermittent every 1 hour  rivaroxaban 15 milliGRAM(s) Oral with dinner  tamsulosin 0.4 milliGRAM(s) Oral at bedtime  tiotropium 2.5 MICROgram(s) Inhaler 2 Puff(s) Inhalation daily    MEDICATIONS  (PRN):  acetaminophen     Tablet .. 650 milliGRAM(s) Oral every 6 hours PRN Temp greater or equal to 38C (100.4F), Mild Pain (1 - 3)  albuterol/ipratropium for Nebulization.. 3 milliLiter(s) Inhalation every 6 hours PRN -for shortness of breath and/or wheezing  dextrose Oral Gel 15 Gram(s) Oral once PRN Blood Glucose LESS THAN 70 milliGRAM(s)/deciliter      Allergies    No Known Allergies    Intolerances        REVIEW OF SYSTEMS:  ROS negative unless stated otherwise.    Vital Signs Last 24 Hrs  T(C): 36.2 (05 Feb 2025 10:39), Max: 36.7 (04 Feb 2025 16:03)  T(F): 97.2 (05 Feb 2025 10:39), Max: 98.1 (04 Feb 2025 16:03)  HR: 70 (05 Feb 2025 10:39) (67 - 71)  BP: 114/75 (05 Feb 2025 10:39) (111/64 - 125/77)  BP(mean): --  RR: 18 (05 Feb 2025 10:39) (17 - 18)  SpO2: 98% (05 Feb 2025 10:39) (94% - 100%)    Parameters below as of 05 Feb 2025 10:39  Patient On (Oxygen Delivery Method): nasal cannula  O2 Flow (L/min): 3      PHYSICAL EXAM:  GENERAL: NAD  HEAD:  Atraumatic   EYES: PERRLA  ENMT: Mouth moist   NECK: Supple  NERVOUS SYSTEM:  Awake, alert  CHEST/LUNG: Diminished , on NC  HEART: RRR, S1, S2  ABDOMEN: Soft, non tender  EXTREMITIES: 1+ BL LE edema  SKIN: No rash      LABS:                        9.9    10.14 )-----------( 216      ( 05 Feb 2025 10:43 )             35.1     02-05    137  |  91[L]  |  68[H]  ----------------------------<  111[H]  3.2[L]   |  43[H]  |  2.52[H]    Ca    9.4      05 Feb 2025 10:43  Mg     2.2     02-05    TPro  7.3  /  Alb  3.0[L]  /  TBili  1.0  /  DBili  x   /  AST  41[H]  /  ALT  24  /  AlkPhos  298[H]  02-05      Urinalysis Basic - ( 05 Feb 2025 10:43 )    Color: x / Appearance: x / SG: x / pH: x  Gluc: 111 mg/dL / Ketone: x  / Bili: x / Urobili: x   Blood: x / Protein: x / Nitrite: x   Leuk Esterase: x / RBC: x / WBC x   Sq Epi: x / Non Sq Epi: x / Bacteria: x      CAPILLARY BLOOD GLUCOSE      POCT Blood Glucose.: 162 mg/dL (05 Feb 2025 11:01)  POCT Blood Glucose.: 117 mg/dL (05 Feb 2025 07:36)  POCT Blood Glucose.: 181 mg/dL (04 Feb 2025 21:16)  POCT Blood Glucose.: 149 mg/dL (04 Feb 2025 16:44)      RADIOLOGY & ADDITIONAL TESTS:    Imaging Personally Reviewed:  [ X] YES  [ ] NO    Consultant(s) Notes Reviewed:  [ X] YES  [ ] NO    Care Discussed with Consultants/Other Providers [X ] YES  [ ] NO

## 2025-02-05 NOTE — PATIENT PROFILE ADULT - VISION (WITH CORRECTIVE LENSES IF THE PATIENT USUALLY WEARS THEM):
2 (mild pain)
Partially impaired: cannot see medication labels or newsprint, but can see obstacles in path, and the surrounding layout; can count fingers at arm's length

## 2025-02-05 NOTE — H&P ADULT - PROBLEM SELECTOR PLAN 7
- On 3-4LNC chronically at home due to COPD  - Back to baseline, titrate O2 as needed to maintain SpO2 88-92%

## 2025-02-05 NOTE — PROGRESS NOTE ADULT - PROVIDER SPECIALTY LIST ADULT
Cardiology
Gastroenterology
Gastroenterology
Hospitalist
Internal Medicine
Nephrology
Cardiology
Gastroenterology
Hospitalist
Internal Medicine
Nephrology
Cardiology
Hospitalist
Internal Medicine
Internal Medicine
Nephrology
Cardiology
Cardiology
Hospitalist
Hospitalist
Cardiology

## 2025-02-05 NOTE — PATIENT PROFILE ADULT - FUNCTIONAL ASSESSMENT - BASIC MOBILITY 6.
2-calculated by average/Not able to assess (calculate score using [facility] averaging method)

## 2025-02-05 NOTE — H&P ADULT - PROBLEM SELECTOR PLAN 8
- Hb currently 9.9, was 6.5 on admission to [facility]  - Patient apparently had some melena at home, intermittent NSAID use  - GI consulted at [facility], recommended pantoprazole 40mg BID and holding off endoscopy for now due to his respiratory status

## 2025-02-05 NOTE — H&P ADULT - PROBLEM SELECTOR PLAN 9
- Takes Ramipril 2.5mg daily and lasix 40mg BID  - C/w Ramipril 2.5mg daily - Takes Ramipril 2.5mg daily and lasix 40mg BID  - Hold ramipril in setting of DENISHA on CKD

## 2025-02-05 NOTE — DISCHARGE NOTE PROVIDER - NSDCCPCAREPLAN_GEN_ALL_CORE_FT
PRINCIPAL DISCHARGE DIAGNOSIS  Diagnosis: Acute on chronic combined systolic and diastolic congestive heart failure  Assessment and Plan of Treatment: Transfer to [facility] for Heart Failure Evaluation      SECONDARY DISCHARGE DIAGNOSES  Diagnosis: HTN (hypertension)  Assessment and Plan of Treatment:     Diagnosis: Symptomatic anemia  Assessment and Plan of Treatment:     Diagnosis: HLD (hyperlipidemia)  Assessment and Plan of Treatment:       Diagnosis: Unspecified atrial fibrillation  Assessment and Plan of Treatment:     Diagnosis: CAD (coronary artery disease)  Assessment and Plan of Treatment:     Diagnosis: COPD without exacerbation  Assessment and Plan of Treatment:     Diagnosis: Upper GI bleed  Assessment and Plan of Treatment:     Diagnosis: Encounter for palliative care  Assessment and Plan of Treatment:     Diagnosis: Chronic hypoxic respiratory failure, on home oxygen therapy  Assessment and Plan of Treatment:     Diagnosis: Stage 4 chronic kidney disease  Assessment and Plan of Treatment:     Diagnosis: Acute-on-chronic renal failure  Assessment and Plan of Treatment:     Diagnosis: Acute on chronic combined systolic and diastolic congestive heart failure  Assessment and Plan of Treatment:

## 2025-02-05 NOTE — H&P ADULT - PROBLEM SELECTOR PLAN 6
- On Symbicort 160-4.5 2 puffs BID + Spiriva + Duonebs PRN at home  - Advair BID + Spiriva + Duonebs q6hrs PRN for SOB/wheezing while admitted  - On 3-4LNC at home

## 2025-02-05 NOTE — PATIENT PROFILE ADULT - AVIAN FLU SYMPTOMS
Patient has been notified sample has been prepared and ready for  . [id number]    [phone] (Mobile) No

## 2025-02-05 NOTE — H&P ADULT - NSICDXPASTSURGICALHX_GEN_ALL_CORE_FT
PAST SURGICAL HISTORY:  S/P CABG x 3 cabg3  in 2003    S/P hernia repair hernia [id number]    S/P implantation of automatic cardioverter/defibrillator (AICD)

## 2025-02-05 NOTE — PROGRESS NOTE ADULT - ASSESSMENT
79M with CAD/CABG, s/p TAVR, HFrEF 25%, s/p ICD, afib, copd on home o2, htn, hld, dm, p/w adhf, acute anemia requiring PRBC transfusion     Remains volume overloaded on current Rx, slowly appears improving    Had been on bumex drip which was d/c'd 2/2 contraction alkalosis, s/p acetazolamide dosing, bicarb improved, 1/27 restarted on diuretics-> Lasix 80 IV q 8h -> Lasix drip at 30mg/hr, metolazone 10/d ->1/31 d/c'd as hypokalemia persists. Back on high dose oral loop diuretics, renal following, monitor renal function, electrolytes, bicarb, acetazolamide dosing again for contraction alkalosis   Still needs (more) potassium repletion.  On Toprol 25/d, add further GDMT for cardiomyopathy as BP and renal function allows.   +Lt UE DVT - on AC, cont on Xarelto, home med  cont with COPD management   Increased dyspnea with drop in O2 sat with physical therapy, limited standing time 2/2 LE weakness      Pt remains hypervolemic despite aggressive diuretic approach, +MARR. Recommend transfer for advanced heart therapeutics, both pt and agrees with the plan. Discussed with medicine team, awaiting bed avaialbility, medicine to medicine transfer

## 2025-02-05 NOTE — H&P ADULT - NSHPLABSRESULTS_GEN_ALL_CORE
9.9    10.14 )-----------( 216      ( 05 Feb 2025 10:43 )             35.1     02-05    x   |  x   |  x   ----------------------------<  x   3.6   |  x   |  x     Ca    9.4      05 Feb 2025 10:43  Mg     2.2     02-05    TPro  7.3  /  Alb  3.0[L]  /  TBili  1.0  /  DBili  x   /  AST  41[H]  /  ALT  24  /  AlkPhos  298[H]  02-05          LIVER FUNCTIONS - ( 05 Feb 2025 10:43 )  Alb: 3.0 g/dL / Pro: 7.3 gm/dL / ALK PHOS: 298 U/L / ALT: 24 U/L / AST: 41 U/L / GGT: x             Urinalysis Basic - ( 05 Feb 2025 10:43 )    Color: x / Appearance: x / SG: x / pH: x  Gluc: 111 mg/dL / Ketone: x  / Bili: x / Urobili: x   Blood: x / Protein: x / Nitrite: x   Leuk Esterase: x / RBC: x / WBC x   Sq Epi: x / Non Sq Epi: x / Bacteria: x

## 2025-02-05 NOTE — DISCHARGE NOTE PROVIDER - HOSPITAL COURSE
79M with H/O CAD /CABG,  HFrEF 25%, ICD,  c/c  Afib , COPD , on home o2,  HTN/ HLD. DM admitted with sob from  anemia requiring PRBC transfusion and  from acute on chronic  systolic chf, Ef 25, and diastolic  dysfunction, mod TR, mod  PTHn, Cardiomyopathy, S/P AICD, S/P bumex drip-> S/P Lasix 80 iv-> complicated 2/2 contraction alkalosis. and persistent hypokalemia.       Acute on chronic respiratory failure:  - Acute on chronic combined systolic and diastolic CHF  - S/P AICD, S/P bumex drip-> S/P Lasix 80 iv-> complicated 2/2 contraction alkalosis and persistent hypokalemia.   - Still volume overloaded restarted PO diuretic on wkend, now on Bumex 2 mg BID  - Standing KCl supplement daily   - VBG with met alk today, added diamox  - On Toprol 25/d, add further GDMT, SGLT2i/ MRA  - Card following  - Fluid and salt restriction, daily wt, I/O  - Pending Tx to [facility] for heart failure team evaluation   - On 3L o2 now    DENISHA on CKD stage 3:  - Contraction alkalosis , Hypokalemia  - renal following , Cr stable    Chronic Afib:  - on Xarelto  - Rate control with BB    Symptomatic anemia:  -  S/p transfusion, seen by GI    DM:  - A1c 7.2  - Continue current insulin regimen     Obesity, BMI  35  SVT in left brachial and basilic vein.    Pt is DNR/I    Discussed with wife at  bedside  Discussed with cardio. pt has SOB with minimal exertion. Card recommended Tx to [facility] for heart failure consult.

## 2025-02-05 NOTE — H&P ADULT - PROBLEM SELECTOR PLAN 11
- On Lantus 15u QHS + Trulicity weekly at home   - Had to be titrated to Lantus 32u QHS + ISS at S, will continue here - On Lantus 15u QHS + Trulicity weekly at home, patient states he wouldn't take it regularly   - Had to be titrated to Lantus 32u QHS + Lispro 3u TID + ISS at S, will continue here

## 2025-02-05 NOTE — H&P ADULT - PROBLEM SELECTOR PLAN 5
- ECG at [facility] personally reviewed, V-paced rhythm seen  - C/w Xarelto 15mg daily

## 2025-02-05 NOTE — H&P ADULT - PROBLEM SELECTOR PLAN 12
BPH - C/w tamsulosin 0.4mg QHS  DVT PPx: Xarelto  Code status: DNR/DNI w/ NIV per palliative GOC with wife at S BPH - C/w tamsulosin 0.4mg QHS  DVT PPx: Xarelto  Code status: DNR/DNI w/ NIV per palliative GOC with wife at [facility], MOL in physical chart

## 2025-02-05 NOTE — H&P ADULT - ASSESSMENT
78 y/o male w/ PMHx CAD s/p CABG, HF (combined HFrEF [EF 25%] and HFpEF [G3DD]) w/ ICD, AFib on Xarelto, severe AS s/p TAVR, COPD on 3-4L home O2, CKD4, HTN, HLD, T2DM, and BPH who presents as a transfer from [facility] for HF evaluation.

## 2025-02-05 NOTE — H&P ADULT - PROBLEM SELECTOR PLAN 4
- S/p CABG  - C/w ASA 81mg daily - S/p CABG  - ASA 81mg daily held at S due to patient having melena and requiring transfusion  - Continue to hold ASA for now, if Hb remaining stable, can continue

## 2025-02-05 NOTE — PROGRESS NOTE ADULT - SUBJECTIVE AND OBJECTIVE BOX
Patient is a 79y old  Male who presents with a chief complaint of sob and edema    PAST MEDICAL & SURGICAL HISTORY:  HTN (hypertension)    COPD (chronic obstructive pulmonary disease)    HLD (hyperlipidemia)    Insulin dependent type 2 diabetes mellitus    Chronic hypoxic respiratory failure, on home oxygen therapy    Stage 4 chronic kidney disease    Chronic combined systolic and diastolic heart failure    atrial fibrillation    S/P CABG x 3    S/P hernia repair    s/p TAVR    S/P implantation of automatic cardioverter/defibrillator (AICD)    INTERVAL HISTORY:  	  MEDICATIONS:  MEDICATIONS  (STANDING):  atorvastatin 20 milliGRAM(s) Oral at bedtime  buMETAnide 2 milliGRAM(s) Oral every 12 hours  fluticasone propionate/ salmeterol 250-50 MICROgram(s) Diskus 1 Dose(s) Inhalation two times a day  insulin glargine Injectable (LANTUS) 32 Unit(s) SubCutaneous at bedtime  insulin lispro (ADMELOG) corrective regimen sliding scale   SubCutaneous three times a day before meals  insulin lispro Injectable (ADMELOG) 3 Unit(s) SubCutaneous three times a day before meals  metoprolol succinate ER 25 milliGRAM(s) Oral daily  pantoprazole    Tablet 40 milliGRAM(s) Oral two times a day  potassium chloride    Tablet ER 30 milliEquivalent(s) Oral two times a day  rivaroxaban 15 milliGRAM(s) Oral with dinner  tamsulosin 0.4 milliGRAM(s) Oral at bedtime  tiotropium 2.5 MICROgram(s) Inhaler 2 Puff(s) Inhalation daily    MEDICATIONS  (PRN):  acetaminophen     Tablet .. 650 milliGRAM(s) Oral every 6 hours PRN Temp greater or equal to 38C (100.4F), Mild Pain (1 - 3)  albuterol/ipratropium for Nebulization.. 3 milliLiter(s) Inhalation every 6 hours PRN -for shortness of breath and/or wheezing  dextrose Oral Gel 15 Gram(s) Oral once PRN Blood Glucose LESS THAN 70 milliGRAM(s)/deciliter    Vitals:  T(F): 97.5 (02-05-25 @ 05:04), Max: 98.2 (02-04-25 @ 10:08)  HR: 67 (02-05-25 @ 09:01) (67 - 71)  BP: 119/71 (02-05-25 @ 05:04) (111/64 - 125/77)  RR: 17 (02-05-25 @ 05:04) (17 - 18)  SpO2: 99% (02-05-25 @ 09:01) (94% - 100%)  Wt(kg): --109.9    02-04 @ 07:01  -  02-05 @ 07:00  --------------------------------------------------------  IN:    Oral Fluid: 660 mL  Total IN: 660 mL    OUT:    Voided (mL): 600 mL  Total OUT: 600 mL    Total NET: 60 mL    PHYSICAL EXAM:  Neuro: Awake, responsive  CV: S1 S2 RRR +SM  Lungs: diminished to bases   GI: Softly distended, BS +, NT, abd wall edema  Extremities: LE edema    RADIOLOGY: < from: Xray Chest 1 View-PORTABLE IMMEDIATE (Xray Chest 1 View-PORTABLE IMMEDIATE .) (01.25.25 @ 10:52) >  IMPRESSION: There is persistent cardiomegaly. Poststernotomy, CABG and   TAVR. Mild pulmonary venous congestive changes with persistent hazy   opacity over the left lower lung zone. Left-sided pleural effusion, left   lower lung zone pneumonia and/or atelectasis cannot be excluded.    < end of copied text >    DIAGNOSTIC TESTING:    [x ] Echocardiogram: < from: TTE Echo Complete w/ Contrast w/ Doppler (08.08.24 @ 13:27) >   1. Left ventricular cavity is normal in size. Left ventricular systolic   function is severely decreased with an ejection fraction visually   estimated at 25 to 30 %. Global left ventricular hypokinesis.   2. There is severe (grade 3) left ventricular diastolic dysfunction,   with elevated left ventricular filling pressure.   3. The right ventricle is not well visualized.   4. Device lead is visualized in the right heart.   5. No significant valvular disease.   6. No pericardial effusion seen.   7. Estimated pulmonary artery systolic pressure is 53 mmHg, consistent   with moderate pulmonary hypertension.   8. Left and moderate left pleural effusion noted.   9. Mild pulmonic regurgitation.  10. Moderate tricuspid regurgitation.    < end of copied text >    [x ] Cardiac Catheterization: < from: NM Pharm Stress Test, Dual Isotope (03.17.21 @ 12:40) >  1. There was scintigraphic evidence for a myocardial infarct in the RCA territory with no significant ischemia.    2. There is severely abnormal left ventricular contractility, globally diminished calculated ejection fraction and no wall motion abnormlaities. Overall post stress ejection fraction was 21%    < end of copied text >    LABS:	 	    04 Feb 2025 08:47    137    |  92     |  71     ----------------------------<  86     3.3     |  41     |  2.44   03 Feb 2025 06:18    136    |  90     |  71     ----------------------------<  140    3.4     |  39     |  2.53     Ca    9.5        04 Feb 2025 08:47                       9.5    8.42  )-----------( 197      ( 03 Feb 2025 06:18 )             33.3   pro-BNP: Pro-Brain Natriuretic Peptide: 7384 pg/mL (01.28.25 @ 06:06)                   Patient is a 79y old  Male who presents with a chief complaint of sob and edema    PAST MEDICAL & SURGICAL HISTORY:  HTN (hypertension)    COPD (chronic obstructive pulmonary disease)    HLD (hyperlipidemia)    Insulin dependent type 2 diabetes mellitus    Chronic hypoxic respiratory failure, on home oxygen therapy    Stage 4 chronic kidney disease    Chronic combined systolic and diastolic heart failure    atrial fibrillation    S/P CABG x 3    S/P hernia repair    s/p TAVR    S/P implantation of automatic cardioverter/defibrillator (AICD)    INTERVAL HISTORY: in no acute distress, hypervolemia and MARR persists   	  MEDICATIONS:  MEDICATIONS  (STANDING):  atorvastatin 20 milliGRAM(s) Oral at bedtime  buMETAnide 2 milliGRAM(s) Oral every 12 hours  fluticasone propionate/ salmeterol 250-50 MICROgram(s) Diskus 1 Dose(s) Inhalation two times a day  insulin glargine Injectable (LANTUS) 32 Unit(s) SubCutaneous at bedtime  insulin lispro (ADMELOG) corrective regimen sliding scale   SubCutaneous three times a day before meals  insulin lispro Injectable (ADMELOG) 3 Unit(s) SubCutaneous three times a day before meals  metoprolol succinate ER 25 milliGRAM(s) Oral daily  pantoprazole    Tablet 40 milliGRAM(s) Oral two times a day  potassium chloride    Tablet ER 30 milliEquivalent(s) Oral two times a day  rivaroxaban 15 milliGRAM(s) Oral with dinner  tamsulosin 0.4 milliGRAM(s) Oral at bedtime  tiotropium 2.5 MICROgram(s) Inhaler 2 Puff(s) Inhalation daily    MEDICATIONS  (PRN):  acetaminophen     Tablet .. 650 milliGRAM(s) Oral every 6 hours PRN Temp greater or equal to 38C (100.4F), Mild Pain (1 - 3)  albuterol/ipratropium for Nebulization.. 3 milliLiter(s) Inhalation every 6 hours PRN -for shortness of breath and/or wheezing  dextrose Oral Gel 15 Gram(s) Oral once PRN Blood Glucose LESS THAN 70 milliGRAM(s)/deciliter    Vitals:  T(F): 97.5 (02-05-25 @ 05:04), Max: 98.2 (02-04-25 @ 10:08)  HR: 67 (02-05-25 @ 09:01) (67 - 71)  BP: 119/71 (02-05-25 @ 05:04) (111/64 - 125/77)  RR: 17 (02-05-25 @ 05:04) (17 - 18)  SpO2: 99% (02-05-25 @ 09:01) (94% - 100%)  Wt(kg): --109.9    02-04 @ 07:01  -  02-05 @ 07:00  --------------------------------------------------------  IN:    Oral Fluid: 660 mL  Total IN: 660 mL    OUT:    Voided (mL): 600 mL  Total OUT: 600 mL    Total NET: 60 mL    PHYSICAL EXAM:  Neuro: Awake, responsive  CV: S1 S2 RRR +SM  Lungs: diminished to bases   GI: Softly distended, BS +, NT, abd wall edema  Extremities: LE edema    RADIOLOGY: < from: Xray Chest 1 View-PORTABLE IMMEDIATE (Xray Chest 1 View-PORTABLE IMMEDIATE .) (01.25.25 @ 10:52) >  IMPRESSION: There is persistent cardiomegaly. Poststernotomy, CABG and   TAVR. Mild pulmonary venous congestive changes with persistent hazy   opacity over the left lower lung zone. Left-sided pleural effusion, left   lower lung zone pneumonia and/or atelectasis cannot be excluded.    < end of copied text >    DIAGNOSTIC TESTING:    [x ] Echocardiogram: < from: TTE Echo Complete w/ Contrast w/ Doppler (08.08.24 @ 13:27) >   1. Left ventricular cavity is normal in size. Left ventricular systolic   function is severely decreased with an ejection fraction visually   estimated at 25 to 30 %. Global left ventricular hypokinesis.   2. There is severe (grade 3) left ventricular diastolic dysfunction,   with elevated left ventricular filling pressure.   3. The right ventricle is not well visualized.   4. Device lead is visualized in the right heart.   5. No significant valvular disease.   6. No pericardial effusion seen.   7. Estimated pulmonary artery systolic pressure is 53 mmHg, consistent   with moderate pulmonary hypertension.   8. Left and moderate left pleural effusion noted.   9. Mild pulmonic regurgitation.  10. Moderate tricuspid regurgitation.    < end of copied text >    [x ] Cardiac Catheterization: < from: NM Pharm Stress Test, Dual Isotope (03.17.21 @ 12:40) >  1. There was scintigraphic evidence for a myocardial infarct in the RCA territory with no significant ischemia.    2. There is severely abnormal left ventricular contractility, globally diminished calculated ejection fraction and no wall motion abnormlaities. Overall post stress ejection fraction was 21%    < end of copied text >    LABS:	 	    04 Feb 2025 08:47    137    |  92     |  71     ----------------------------<  86     3.3     |  41     |  2.44   03 Feb 2025 06:18    136    |  90     |  71     ----------------------------<  140    3.4     |  39     |  2.53     Ca    9.5        04 Feb 2025 08:47                       9.5    8.42  )-----------( 197      ( 03 Feb 2025 06:18 )             33.3   pro-BNP: Pro-Brain Natriuretic Peptide: 7384 pg/mL (01.28.25 @ 06:06)

## 2025-02-05 NOTE — H&P ADULT - NSHPREVIEWOFSYSTEMS_GEN_ALL_CORE
Review of Systems:   CONSTITUTIONAL: No fever, weight loss  EYES: No eye pain, visual disturbances, or discharge  RESPIRATORY: No SOB. No cough, wheezing, chills or hemoptysis  CARDIOVASCULAR: No chest pain, palpitations, dizziness, or leg swelling  GASTROINTESTINAL: No abdominal or epigastric pain. No nausea, vomiting, or hematemesis; No diarrhea or constipation. No melena or hematochezia.  GENITOURINARY: No dysuria, frequency, hematuria, or incontinence  NEUROLOGICAL: No headaches, memory loss, loss of strength, numbness, or tremors  SKIN: No itching, burning, rashes, or lesions   MUSCULOSKELETAL: No joint pain or swelling; No muscle, back pain  PSYCHIATRIC: No depression, anxiety, mood swings, or difficulty sleeping  HEME/LYMPH: No easy bruising, or bleeding gums Review of Systems:   CONSTITUTIONAL: No fever, weight loss  EYES: No eye pain, visual disturbances, or discharge  RESPIRATORY: No SOB. No cough, wheezing, chills or hemoptysis  CARDIOVASCULAR: +leg swelling; No chest pain, palpitations, dizziness  GASTROINTESTINAL: No abdominal or epigastric pain. No nausea, vomiting, or hematemesis; No diarrhea or constipation. No melena or hematochezia.  GENITOURINARY: No dysuria, frequency, hematuria, or incontinence  NEUROLOGICAL: No headaches, memory loss, loss of strength, numbness, or tremors  SKIN: No itching, burning, rashes, or lesions   MUSCULOSKELETAL: No joint pain or swelling; No muscle, back pain  PSYCHIATRIC: No depression, anxiety, mood swings, or difficulty sleeping  HEME/LYMPH: No easy bruising, or bleeding gums

## 2025-02-05 NOTE — PROGRESS NOTE ADULT - SUBJECTIVE AND OBJECTIVE BOX
[facility] NEPHROLOGY SERVICES, [facility]  NEPHROLOGY AND HYPERTENSION  [address]  [address]  [place] [address]  [phone]    MD DANIEL GARRETT MD YELENA ROSENBERG, MD BINNY KOSHY, MD CHRISTOPHER CAPUTO, MD EDWARD BOVER, MD          Patient events noted  No distress      MEDICATIONS  (STANDING):  acetaZOLAMIDE Injectable 250 milliGRAM(s) IV Push every 12 hours  atorvastatin 20 milliGRAM(s) Oral at bedtime  buMETAnide 2 milliGRAM(s) Oral every 12 hours  dextrose 5%. 1000 milliLiter(s) (50 mL/Hr) IV Continuous <Continuous>  dextrose 5%. 1000 milliLiter(s) (100 mL/Hr) IV Continuous <Continuous>  dextrose 50% Injectable 25 Gram(s) IV Push once  dextrose 50% Injectable 12.5 Gram(s) IV Push once  dextrose 50% Injectable 25 Gram(s) IV Push once  fluticasone propionate/ salmeterol 250-50 MICROgram(s) Diskus 1 Dose(s) Inhalation two times a day  glucagon  Injectable 1 milliGRAM(s) IntraMuscular once  glucagon  Injectable 1 milliGRAM(s) IntraMuscular once  influenza  Vaccine (HIGH DOSE) 0.5 milliLiter(s) IntraMuscular once  insulin glargine Injectable (LANTUS) 32 Unit(s) SubCutaneous at bedtime  insulin lispro (ADMELOG) corrective regimen sliding scale   SubCutaneous three times a day before meals  insulin lispro Injectable (ADMELOG) 3 Unit(s) SubCutaneous three times a day before meals  metoprolol succinate ER 25 milliGRAM(s) Oral daily  pantoprazole    Tablet 40 milliGRAM(s) Oral two times a day  potassium chloride    Tablet ER 30 milliEquivalent(s) Oral two times a day  rivaroxaban 15 milliGRAM(s) Oral with dinner  tamsulosin 0.4 milliGRAM(s) Oral at bedtime  tiotropium 2.5 MICROgram(s) Inhaler 2 Puff(s) Inhalation daily    MEDICATIONS  (PRN):  acetaminophen     Tablet .. 650 milliGRAM(s) Oral every 6 hours PRN Temp greater or equal to 38C (100.4F), Mild Pain (1 - 3)  albuterol/ipratropium for Nebulization.. 3 milliLiter(s) Inhalation every 6 hours PRN -for shortness of breath and/or wheezing  dextrose Oral Gel 15 Gram(s) Oral once PRN Blood Glucose LESS THAN 70 milliGRAM(s)/deciliter      25 @ 07:01  -  25 @ 07:00  --------------------------------------------------------  IN: 660 mL / OUT: 600 mL / NET: 60 mL    25 @ 07:01  -  25 @ 22:31  --------------------------------------------------------  IN: 1020 mL / OUT: 280 mL / NET: 740 mL      PHYSICAL EXAM:      T(C): 37 (25 @ 20:35), Max: 37 (25 @ 20:35)  HR: 70 (25 @ 20:35) (67 - 73)  BP: 120/63 (25 @ 20:35) (109/63 - 120/63)  RR: 18 (25 @ 20:35) (17 - 20)  SpO2: 92% (25 @ 20:35) (92% - 99%)  Wt(kg): --  Lungs clear  Heart S1S2  Abd soft NT ND  Extremities:   tr edema                                    9.9    10.14 )-----------( 216      ( 2025 10:43 )             35.1     02-05    x   |  x   |  x   ----------------------------<  x   3.6   |  x   |  x     Ca    9.4      2025 10:43  Mg     2.2     02-05    TPro  7.3  /  Alb  3.0[L]  /  TBili  1.0  /  DBili  x   /  AST  41[H]  /  ALT  24  /  AlkPhos  298[H]  02-05      LIVER FUNCTIONS - ( 2025 10:43 )  Alb: 3.0 g/dL / Pro: 7.3 gm/dL / ALK PHOS: 298 U/L / ALT: 24 U/L / AST: 41 U/L / GGT: x           Creatinine Trend: 2.52<--, 2.44<--, 2.53<--, 2.78<--, 2.96<--, 3.01<--    Daily Weight Trend:   Weight in k.9 (25 @ 05:04)  Weight in k.9 (25 @ 21:30)  Weight in k.5 (25 @ 04:48)  Weight in k.5 (25 @ 04:46)      Assessment    CM, EF 25 - 30%, mod TR  Fluid overload   DENISHA/CKD 3  Contraction alkalosis   Overall stable renal indices, however, overall diuresis modest and complicated by severe metabolic alkalosis     Plan  Diamox 250 mg q 12  Suppl K  Follow VBG  Weight on scale   F/u BMP, Mg, UO  Avoid nephrotoxins as able  Transfer to tertiary care heart failure service   Patient and wife agreeable to HD if all options exhausted   Jon Rowe MD

## 2025-02-05 NOTE — H&P ADULT - PROBLEM SELECTOR PLAN 1
CHOOSE ONE: HFpEF + HFrEF  EF 25-30% w/ G3DD and PASP 53mmHG on 8/8/24  Heart failure, CHF order set initiated    Guideline directed medical therapy as below: (Name/Dose/Frequency)  - Diuretic: Bumex 2mg BID  - BB:  Metoprolol succinate 25mg daily  - ARNI/ACE-I/ARB: Ramipril 2.5mg daily  - Hydralazine/Nitrate: None  - MRA: Was on spironolactone last year, consider reintroducing  - SGLT2: N/A  HFpEF - Recommend diuretics, BP control, HR control, MRA, & SGLT2  HFrEF - Recommend diuretics, BB, ARNI preferred/ACE-I/ARB, MRA, & SGLT2  HF consult to be called in AM CHOOSE ONE: HFpEF + HFrEF  EF 25-30% w/ G3DD and PASP 53mmHG on 8/8/24  Heart failure, CHF order set initiated    Guideline directed medical therapy as below: (Name/Dose/Frequency)  - Diuretic: Bumex 2mg BID = Diamox 250mg BID  - BB:  Metoprolol succinate 25mg daily  - ARNI/ACE-I/ARB: Ramipril 2.5mg daily  - Hydralazine/Nitrate: None  - MRA: Was on spironolactone last year, consider reintroducing  - SGLT2: N/A  HFpEF - Recommend diuretics, BP control, HR control, MRA, & SGLT2  HFrEF - Recommend diuretics, BB, ARNI preferred/ACE-I/ARB, MRA, & SGLT2  HF consult to be called in AM CHOOSE ONE: HFpEF + HFrEF  EF 25-30% w/ G3DD and PASP 53mmHG on 8/8/24  Heart failure, CHF order set initiated    Guideline directed medical therapy as below: (Name/Dose/Frequency)  - Diuretic: Bumex 2mg BID = Diamox 250mg BID  - BB:  Metoprolol succinate 50mg daily  - ARNI/ACE-I/ARB: Ramipril 2.5mg daily - hold given DENISHA on CKD  - Hydralazine/Nitrate: None  - MRA: Was on spironolactone last year, consider reintroducing  - SGLT2: N/A  HFpEF - Recommend diuretics, BP control, HR control, MRA, & SGLT2  HFrEF - Recommend diuretics, BB, ARNI preferred/ACE-I/ARB, MRA, & SGLT2  HF consult to be called in AM CHOOSE ONE: HFpEF + HFrEF  EF 25-30% w/ G3DD and PASP 53mmHG on 8/8/24  Heart failure, CHF order set initiated    Guideline directed medical therapy as below: (Name/Dose/Frequency)  - Diuretic: Bumex 2mg BID = Diamox 250mg BID (PO KCl 20mEq BID for hypokalemia)  - BB:  Metoprolol succinate 50mg daily  - ARNI/ACE-I/ARB: Ramipril 2.5mg daily - hold given DENISHA on CKD  - Hydralazine/Nitrate: None  - MRA: Was on spironolactone last year, consider reintroducing  - SGLT2: N/A  HFpEF - Recommend diuretics, BP control, HR control, MRA, & SGLT2  HFrEF - Recommend diuretics, BB, ARNI preferred/ACE-I/ARB, MRA, & SGLT2  HF consult to be called in AM

## 2025-02-06 PROBLEM — E11.9 TYPE 2 DIABETES MELLITUS WITHOUT COMPLICATIONS: Chronic | Status: ACTIVE | Noted: 2025-01-10

## 2025-02-06 PROBLEM — N18.4 CHRONIC KIDNEY DISEASE, STAGE 4 (SEVERE): Chronic | Status: ACTIVE | Noted: 2025-01-10

## 2025-02-06 PROBLEM — I48.91 UNSPECIFIED ATRIAL FIBRILLATION: Chronic | Status: ACTIVE | Noted: 2025-01-10

## 2025-02-06 PROBLEM — J96.11 CHRONIC RESPIRATORY FAILURE WITH HYPOXIA: Chronic | Status: ACTIVE | Noted: 2025-01-10

## 2025-02-06 PROBLEM — E78.5 HYPERLIPIDEMIA, UNSPECIFIED: Chronic | Status: ACTIVE | Noted: 2025-01-10

## 2025-02-06 PROBLEM — I50.42 CHRONIC COMBINED SYSTOLIC (CONGESTIVE) AND DIASTOLIC (CONGESTIVE) HEART FAILURE: Chronic | Status: ACTIVE | Noted: 2025-01-10

## 2025-02-06 LAB
ALBUMIN SERPL ELPH-MCNC: 3.6 G/DL — SIGNIFICANT CHANGE UP (ref 3.3–5)
ALP SERPL-CCNC: 313 U/L — HIGH (ref 40–120)
ALT FLD-CCNC: 16 U/L — SIGNIFICANT CHANGE UP (ref 10–45)
ANION GAP SERPL CALC-SCNC: 10 MMOL/L — SIGNIFICANT CHANGE UP (ref 5–17)
AST SERPL-CCNC: 35 U/L — SIGNIFICANT CHANGE UP (ref 10–40)
BILIRUB SERPL-MCNC: 0.8 MG/DL — SIGNIFICANT CHANGE UP (ref 0.2–1.2)
BUN SERPL-MCNC: 67 MG/DL — HIGH (ref 7–23)
CALCIUM SERPL-MCNC: 9.6 MG/DL — SIGNIFICANT CHANGE UP (ref 8.4–10.5)
CHLORIDE SERPL-SCNC: 90 MMOL/L — LOW (ref 96–108)
CO2 SERPL-SCNC: 40 MMOL/L — HIGH (ref 22–31)
CREAT SERPL-MCNC: 2.49 MG/DL — HIGH (ref 0.5–1.3)
EGFR: 25 ML/MIN/1.73M2 — LOW
EGFR: 25 ML/MIN/1.73M2 — LOW
GLUCOSE BLDC GLUCOMTR-MCNC: 130 MG/DL — HIGH (ref 70–99)
GLUCOSE BLDC GLUCOMTR-MCNC: 172 MG/DL — HIGH (ref 70–99)
GLUCOSE BLDC GLUCOMTR-MCNC: 177 MG/DL — HIGH (ref 70–99)
GLUCOSE BLDC GLUCOMTR-MCNC: 207 MG/DL — HIGH (ref 70–99)
GLUCOSE BLDC GLUCOMTR-MCNC: 249 MG/DL — HIGH (ref 70–99)
GLUCOSE SERPL-MCNC: 183 MG/DL — HIGH (ref 70–99)
HCT VFR BLD CALC: 35 % — LOW (ref 39–50)
HGB BLD-MCNC: 9.8 G/DL — LOW (ref 13–17)
MAGNESIUM SERPL-MCNC: 2 MG/DL — SIGNIFICANT CHANGE UP (ref 1.6–2.6)
MCHC RBC-ENTMCNC: 26.4 PG — LOW (ref 27–34)
MCHC RBC-ENTMCNC: 28 G/DL — LOW (ref 32–36)
MCV RBC AUTO: 94.3 FL — SIGNIFICANT CHANGE UP (ref 80–100)
NRBC # BLD: 0 /100 WBCS — SIGNIFICANT CHANGE UP (ref 0–0)
NRBC BLD-RTO: 0 /100 WBCS — SIGNIFICANT CHANGE UP (ref 0–0)
PLATELET # BLD AUTO: 215 K/UL — SIGNIFICANT CHANGE UP (ref 150–400)
POTASSIUM SERPL-MCNC: 3.2 MMOL/L — LOW (ref 3.5–5.3)
POTASSIUM SERPL-SCNC: 3.2 MMOL/L — LOW (ref 3.5–5.3)
PROT SERPL-MCNC: 7.4 G/DL — SIGNIFICANT CHANGE UP (ref 6–8.3)
RBC # BLD: 3.71 M/UL — LOW (ref 4.2–5.8)
RBC # FLD: 24.9 % — HIGH (ref 10.3–14.5)
SODIUM SERPL-SCNC: 140 MMOL/L — SIGNIFICANT CHANGE UP (ref 135–145)
WBC # BLD: 10.85 K/UL — HIGH (ref 3.8–10.5)
WBC # FLD AUTO: 10.85 K/UL — HIGH (ref 3.8–10.5)

## 2025-02-06 PROCEDURE — 99222 1ST HOSP IP/OBS MODERATE 55: CPT

## 2025-02-06 PROCEDURE — 99233 SBSQ HOSP IP/OBS HIGH 50: CPT

## 2025-02-06 PROCEDURE — 93971 EXTREMITY STUDY: CPT | Mod: 26,LT

## 2025-02-06 RX ORDER — ACETAZOLAMIDE 250 MG/1
250 TABLET ORAL EVERY 12 HOURS
Refills: 0 | Status: DISCONTINUED | OUTPATIENT
Start: 2025-02-06 | End: 2025-02-10

## 2025-02-06 RX ORDER — BUMETANIDE 1 MG/1
1 TABLET ORAL
Qty: 20 | Refills: 0 | Status: DISCONTINUED | OUTPATIENT
Start: 2025-02-06 | End: 2025-02-11

## 2025-02-06 RX ORDER — POLYETHYLENE GLYCOL 3350 17 G/17G
17 POWDER, FOR SOLUTION ORAL
Refills: 0 | Status: DISCONTINUED | OUTPATIENT
Start: 2025-02-06 | End: 2025-02-23

## 2025-02-06 RX ADMIN — INSULIN GLARGINE-YFGN 32 UNIT(S): 100 INJECTION, SOLUTION SUBCUTANEOUS at 01:17

## 2025-02-06 RX ADMIN — RIVAROXABAN 15 MILLIGRAM(S): 10 TABLET, FILM COATED ORAL at 18:41

## 2025-02-06 RX ADMIN — Medication 20 MILLIEQUIVALENT(S): at 05:26

## 2025-02-06 RX ADMIN — BUMETANIDE 2 MILLIGRAM(S): 1 TABLET ORAL at 13:19

## 2025-02-06 RX ADMIN — BUMETANIDE 2 MILLIGRAM(S): 1 TABLET ORAL at 05:27

## 2025-02-06 RX ADMIN — INSULIN LISPRO 4: 100 INJECTION, SOLUTION INTRAVENOUS; SUBCUTANEOUS at 08:54

## 2025-02-06 RX ADMIN — BUMETANIDE 5 MG/HR: 1 TABLET ORAL at 18:45

## 2025-02-06 RX ADMIN — INSULIN LISPRO 3 UNIT(S): 100 INJECTION, SOLUTION INTRAVENOUS; SUBCUTANEOUS at 13:18

## 2025-02-06 RX ADMIN — Medication 1 DOSE(S): at 05:27

## 2025-02-06 RX ADMIN — ATORVASTATIN CALCIUM 20 MILLIGRAM(S): 80 TABLET, FILM COATED ORAL at 21:17

## 2025-02-06 RX ADMIN — INSULIN LISPRO 3 UNIT(S): 100 INJECTION, SOLUTION INTRAVENOUS; SUBCUTANEOUS at 18:41

## 2025-02-06 RX ADMIN — INSULIN LISPRO 3 UNIT(S): 100 INJECTION, SOLUTION INTRAVENOUS; SUBCUTANEOUS at 08:54

## 2025-02-06 RX ADMIN — ACETAZOLAMIDE 250 MILLIGRAM(S): 250 TABLET ORAL at 06:44

## 2025-02-06 RX ADMIN — ACETAZOLAMIDE 250 MILLIGRAM(S): 250 TABLET ORAL at 18:41

## 2025-02-06 RX ADMIN — METOPROLOL SUCCINATE 50 MILLIGRAM(S): 50 TABLET, EXTENDED RELEASE ORAL at 05:27

## 2025-02-06 RX ADMIN — Medication 40 MILLIEQUIVALENT(S): at 18:40

## 2025-02-06 RX ADMIN — TAMSULOSIN HYDROCHLORIDE 0.4 MILLIGRAM(S): 0.4 CAPSULE ORAL at 21:18

## 2025-02-06 RX ADMIN — INSULIN LISPRO 2: 100 INJECTION, SOLUTION INTRAVENOUS; SUBCUTANEOUS at 13:18

## 2025-02-06 RX ADMIN — TIOTROPIUM BROMIDE INHALATION SPRAY 2 PUFF(S): 3.12 SPRAY, METERED RESPIRATORY (INHALATION) at 13:19

## 2025-02-06 RX ADMIN — Medication 1 DOSE(S): at 18:41

## 2025-02-06 NOTE — PROGRESS NOTE ADULT - PROBLEM SELECTOR PLAN 3
- US doppler done for LUE swelling at LVS found non-occlusive thrombus within the brachial and basilic vein   - Continue on Xarelto  -repeat VA duplex LUE

## 2025-02-06 NOTE — PROGRESS NOTE ADULT - SUBJECTIVE AND OBJECTIVE BOX
Patient is a 79y old  Male who presents with a chief complaint of HF eval (05 Feb 2025 21:35)        SUBJECTIVE / OVERNIGHT EVENTS: Patient had no acute events overnight. Patient seen and examined at bedside this morning. Wife at bedside. Reports uses CPAP at night. Swelling has improved. Denies sob    ROS: [ - ] Fever [ - ] Chills [ - ] Nausea/Vomiting [ - ] Chest Pain [ - ] Shortness of breath     MEDICATIONS  (STANDING):  acetaZOLAMIDE Injectable 250 milliGRAM(s) IV Push every 12 hours  atorvastatin 20 milliGRAM(s) Oral at bedtime  buMETAnide 2 milliGRAM(s) Oral two times a day  dextrose 5%. 1000 milliLiter(s) (50 mL/Hr) IV Continuous <Continuous>  dextrose 5%. 1000 milliLiter(s) (100 mL/Hr) IV Continuous <Continuous>  dextrose 50% Injectable 25 Gram(s) IV Push once  dextrose 50% Injectable 12.5 Gram(s) IV Push once  dextrose 50% Injectable 25 Gram(s) IV Push once  fluticasone propionate/ salmeterol 250-50 MICROgram(s) Diskus 1 Dose(s) Inhalation two times a day  glucagon  Injectable 1 milliGRAM(s) IntraMuscular once  insulin glargine Injectable (LANTUS) 32 Unit(s) SubCutaneous at bedtime  insulin lispro (ADMELOG) corrective regimen sliding scale   SubCutaneous three times a day before meals  insulin lispro Injectable (ADMELOG) 3 Unit(s) SubCutaneous three times a day before meals  metoprolol succinate ER 50 milliGRAM(s) Oral daily  potassium chloride    Tablet ER 20 milliEquivalent(s) Oral two times a day  rivaroxaban 15 milliGRAM(s) Oral with dinner  tamsulosin 0.4 milliGRAM(s) Oral at bedtime  tiotropium 2.5 MICROgram(s) Inhaler 2 Puff(s) Inhalation daily    MEDICATIONS  (PRN):  acetaminophen     Tablet .. 650 milliGRAM(s) Oral every 6 hours PRN Temp greater or equal to 38C (100.4F), Mild Pain (1 - 3)  albuterol/ipratropium for Nebulization 3 milliLiter(s) Nebulizer every 6 hours PRN Shortness of Breath and/or Wheezing  dextrose Oral Gel 15 Gram(s) Oral once PRN Blood Glucose LESS THAN 70 milliGRAM(s)/deciliter  melatonin 3 milliGRAM(s) Oral at bedtime PRN Insomnia      Vital Signs Last 24 Hrs  T(C): 36.2 (06 Feb 2025 11:13), Max: 37 (05 Feb 2025 20:35)  T(F): 97.1 (06 Feb 2025 11:13), Max: 98.6 (05 Feb 2025 20:35)  HR: 71 (06 Feb 2025 11:13) (68 - 73)  BP: 104/61 (06 Feb 2025 11:13) (104/61 - 121/76)  BP(mean): --  RR: 18 (06 Feb 2025 11:13) (18 - 20)  SpO2: 96% (06 Feb 2025 11:13) (92% - 99%)    Parameters below as of 06 Feb 2025 11:13  Patient On (Oxygen Delivery Method): nasal cannula  O2 Flow (L/min): 3    CAPILLARY BLOOD GLUCOSE      POCT Blood Glucose.: 177 mg/dL (06 Feb 2025 12:30)  POCT Blood Glucose.: 207 mg/dL (06 Feb 2025 08:35)  POCT Blood Glucose.: 249 mg/dL (06 Feb 2025 01:15)  POCT Blood Glucose.: 225 mg/dL (05 Feb 2025 21:42)  POCT Blood Glucose.: 121 mg/dL (05 Feb 2025 16:35)    I&O's Summary    05 Feb 2025 07:01  -  06 Feb 2025 07:00  --------------------------------------------------------  IN: 0 mL / OUT: 800 mL / NET: -800 mL    06 Feb 2025 07:01  -  06 Feb 2025 15:25  --------------------------------------------------------  IN: 120 mL / OUT: 500 mL / NET: -380 mL        PHYSICAL EXAM  GENERAL: NAD, lying comfortably in bed   HEENT:  Atraumatic, Normocephalic, EOMI, conjunctiva and sclera clear, no LAD  CHEST/LUNG: Clear to auscultation bilaterally; No wheeze  HEART: RRR, S1 and S2 No murmurs, rubs, or gallops  ABDOMEN: Soft, Nontender, Nondistended; Bowel sounds present  EXTREMITIES:  2+ Peripheral Pulses, No clubbing, cyanosis, or edema  NEURO: AAOx3, non-focal  SKIN: No rashes or lesions    LABS:                        9.8    10.85 )-----------( 215      ( 06 Feb 2025 10:32 )             35.0     02-06    140  |  90[L]  |  67[H]  ----------------------------<  183[H]  3.2[L]   |  40[H]  |  2.49[H]    Ca    9.6      06 Feb 2025 10:32  Mg     2.0     02-06    TPro  7.4  /  Alb  3.6  /  TBili  0.8  /  DBili  x   /  AST  35  /  ALT  16  /  AlkPhos  313[H]  02-06          Urinalysis Basic - ( 06 Feb 2025 10:32 )    Color: x / Appearance: x / SG: x / pH: x  Gluc: 183 mg/dL / Ketone: x  / Bili: x / Urobili: x   Blood: x / Protein: x / Nitrite: x   Leuk Esterase: x / RBC: x / WBC x   Sq Epi: x / Non Sq Epi: x / Bacteria: x          RADIOLOGY & ADDITIONAL TESTS:      Labs Personally Reviewed  Imaging Personally Reviewed  Consultant(s) Notes Reviewed        Patient is a 79y old  Male who presents with a chief complaint of HF eval (05 Feb 2025 21:35)        SUBJECTIVE / OVERNIGHT EVENTS: Patient had no acute events overnight. Patient seen and examined at bedside this morning. Wife at bedside. Reports uses CPAP at night. Swelling has improved. Denies sob    ROS: [ - ] Fever [ - ] Chills [ - ] Nausea/Vomiting [ - ] Chest Pain [ - ] Shortness of breath     MEDICATIONS  (STANDING):  acetaZOLAMIDE Injectable 250 milliGRAM(s) IV Push every 12 hours  atorvastatin 20 milliGRAM(s) Oral at bedtime  buMETAnide 2 milliGRAM(s) Oral two times a day  dextrose 5%. 1000 milliLiter(s) (50 mL/Hr) IV Continuous <Continuous>  dextrose 5%. 1000 milliLiter(s) (100 mL/Hr) IV Continuous <Continuous>  dextrose 50% Injectable 25 Gram(s) IV Push once  dextrose 50% Injectable 12.5 Gram(s) IV Push once  dextrose 50% Injectable 25 Gram(s) IV Push once  fluticasone propionate/ salmeterol 250-50 MICROgram(s) Diskus 1 Dose(s) Inhalation two times a day  glucagon  Injectable 1 milliGRAM(s) IntraMuscular once  insulin glargine Injectable (LANTUS) 32 Unit(s) SubCutaneous at bedtime  insulin lispro (ADMELOG) corrective regimen sliding scale   SubCutaneous three times a day before meals  insulin lispro Injectable (ADMELOG) 3 Unit(s) SubCutaneous three times a day before meals  metoprolol succinate ER 50 milliGRAM(s) Oral daily  potassium chloride    Tablet ER 20 milliEquivalent(s) Oral two times a day  rivaroxaban 15 milliGRAM(s) Oral with dinner  tamsulosin 0.4 milliGRAM(s) Oral at bedtime  tiotropium 2.5 MICROgram(s) Inhaler 2 Puff(s) Inhalation daily    MEDICATIONS  (PRN):  acetaminophen     Tablet .. 650 milliGRAM(s) Oral every 6 hours PRN Temp greater or equal to 38C (100.4F), Mild Pain (1 - 3)  albuterol/ipratropium for Nebulization 3 milliLiter(s) Nebulizer every 6 hours PRN Shortness of Breath and/or Wheezing  dextrose Oral Gel 15 Gram(s) Oral once PRN Blood Glucose LESS THAN 70 milliGRAM(s)/deciliter  melatonin 3 milliGRAM(s) Oral at bedtime PRN Insomnia      Vital Signs Last 24 Hrs  T(C): 36.2 (06 Feb 2025 11:13), Max: 37 (05 Feb 2025 20:35)  T(F): 97.1 (06 Feb 2025 11:13), Max: 98.6 (05 Feb 2025 20:35)  HR: 71 (06 Feb 2025 11:13) (68 - 73)  BP: 104/61 (06 Feb 2025 11:13) (104/61 - 121/76)  BP(mean): --  RR: 18 (06 Feb 2025 11:13) (18 - 20)  SpO2: 96% (06 Feb 2025 11:13) (92% - 99%)    Parameters below as of 06 Feb 2025 11:13  Patient On (Oxygen Delivery Method): nasal cannula  O2 Flow (L/min): 3    CAPILLARY BLOOD GLUCOSE      POCT Blood Glucose.: 177 mg/dL (06 Feb 2025 12:30)  POCT Blood Glucose.: 207 mg/dL (06 Feb 2025 08:35)  POCT Blood Glucose.: 249 mg/dL (06 Feb 2025 01:15)  POCT Blood Glucose.: 225 mg/dL (05 Feb 2025 21:42)  POCT Blood Glucose.: 121 mg/dL (05 Feb 2025 16:35)    I&O's Summary    05 Feb 2025 07:01  -  06 Feb 2025 07:00  --------------------------------------------------------  IN: 0 mL / OUT: 800 mL / NET: -800 mL    06 Feb 2025 07:01  -  06 Feb 2025 15:25  --------------------------------------------------------  IN: 120 mL / OUT: 500 mL / NET: -380 mL        PHYSICAL EXAM  GENERAL: NAD, lying comfortably in bed, overloaded on 3LNC  HEENT:  Atraumatic, Normocephalic, EOMI, conjunctiva and sclera clear,   CHEST/LUNG: decreased breath sounds  HEART: RRR, S1 and S2 No murmurs, rubs, or gallops  ABDOMEN: Soft, distended, nontender. nontympanic  EXTREMITIES:  3+ LE edema  NEURO: AAOx3, non-focal  SKIN: No rashes or lesions    LABS:                        9.8    10.85 )-----------( 215      ( 06 Feb 2025 10:32 )             35.0     02-06    140  |  90[L]  |  67[H]  ----------------------------<  183[H]  3.2[L]   |  40[H]  |  2.49[H]    Ca    9.6      06 Feb 2025 10:32  Mg     2.0     02-06    TPro  7.4  /  Alb  3.6  /  TBili  0.8  /  DBili  x   /  AST  35  /  ALT  16  /  AlkPhos  313[H]  02-06          Urinalysis Basic - ( 06 Feb 2025 10:32 )    Color: x / Appearance: x / SG: x / pH: x  Gluc: 183 mg/dL / Ketone: x  / Bili: x / Urobili: x   Blood: x / Protein: x / Nitrite: x   Leuk Esterase: x / RBC: x / WBC x   Sq Epi: x / Non Sq Epi: x / Bacteria: x          RADIOLOGY & ADDITIONAL TESTS:      Labs Personally Reviewed  Imaging Personally Reviewed  Consultant(s) Notes Reviewed

## 2025-02-06 NOTE — PROGRESS NOTE ADULT - ASSESSMENT
80 y/o male w/ PMHx CAD s/p CABG, HF (combined HFrEF [EF 25%] and HFpEF [G3DD]) w/ ICD, AFib on Xarelto, severe AS s/p TAVR, COPD on 3-4L home O2, CKD4, HTN, HLD, T2DM, and BPH who presents as a transfer from [facility] for HF evaluation.

## 2025-02-06 NOTE — CONSULT NOTE ADULT - PROBLEM SELECTOR RECOMMENDATION 9
Etiology likely ischemic. Primary cardiologist Dr. Jewel Stubbs ([facility]), last TTE LVEF 25-30%. Appears has been on some GDMT as outpt (?Farxiga, Coreg and previously on ARNI though unclear why stopped)  - Currently on Toprol 25mg QD; Will defer escalation while in decompensated state  -Will defer ARB/ARNI/SGLT2I/MRA for now gien degree of renal dysfunction. Can introduce afterload reducing agents with PO HDZN/ISDN if BP permits.  - Would stop PO Bumex and transition to continuos Bumex infusion today.   - Has ICD in place; Currently a DNR/DNI. Would engage palliative for clarification on GOC in regards to device.   - Trend twice daily BMP, LFTs, while on continuos loop diuretics  - Aggressive PT  - Daily Standing weight  - Strict I/O's  - Target electrolyte repletion to target K+ >4.0 and Mg >2.0 Etiology likely ischemic. Primary cardiologist Dr. Jewel Stubbs ([facility]), last TTE LVEF 25-30%. Appears has been on some GDMT as outpt (?Farxiga, Coreg and previously on ARNI though unclear why stopped)  - Currently on Toprol 25mg QD; Will defer escalation while in decompensated state  -Will defer ARB/ARNI/SGLT2I/MRA for now gien degree of renal dysfunction. Can introduce afterload reducing agents with PO HDZN/ISDN if BP permits.  - Would stop PO Bumex and transition to continuos Bumex infusion today; Please aggressively replete K+ prior/concurrently.   - Has ICD in place; Currently a DNR/DNI. Would engage palliative for clarification on GOC in regards to device.   - Trend twice daily BMP, LFTs, while on continuos loop diuretics  - Aggressive PT  - Daily Standing weight  - Strict I/O's  - Target electrolyte repletion to target K+ >4.0 and Mg >2.0

## 2025-02-06 NOTE — PROGRESS NOTE ADULT - PROBLEM SELECTOR PLAN 1
CHOOSE ONE: HFpEF + HFrEF  EF 25-30% w/ G3DD and PASP 53mmHG on 8/8/24  Heart failure, CHF order set initiated    Guideline directed medical therapy as below: (Name/Dose/Frequency)  - Diuretic: Start bumex gtt 1mg/hr, Diamox 250mg BID (PO KCl 40mEq BID for hypokalemia)  - BB:  Metoprolol succinate 50mg daily  - ARNI/ACE-I/ARB: Ramipril 2.5mg daily - hold given DENISHA on CKD  - Hydralazine/Nitrate: None  - MRA: Was on spironolactone last year, will reintroduce pending denisha  - SGLT2: add on discharge if covered by insurance  HFpEF - Recommend diuretics, BP control, HR control, MRA, & SGLT2  HFrEF - Recommend diuretics, BB, ARNI preferred/ACE-I/ARB, MRA, & SGLT2  HF consult to be called in AM

## 2025-02-06 NOTE — CONSULT NOTE ADULT - SUBJECTIVE AND OBJECTIVE BOX
ADVANCED HEART FAILURE & TRANSPLANT  - PROGRESS NOTE  *To reach the NS2 Team from 8am to 5pm (MON-FRI), please call [phone].   _______________________________________________________________________________________________________      HPI:  This is an 78 y/o male w/ PMHx CAD s/p CABG, HF (combined HFrEF [EF 25%] and HFpEF [G3DD]) w/ ICD, AFib on Xarelto, severe AS s/p TAVR, COPD on 3-4L home O2, CKD3, HTN, HLD, T2DM, and BPH who presents as a transfer from [facility] for HF evaluation. He presented to [place] on 1/10/25 for worsening of his chronic SOB for 2 weeks, with associated increased swelling of legs and abdomen. He was admitted for acute on chronic hypoxic respiratory failure due to both anemia and acute on chronic CHF exacerbation. He was anemic to 6.5 on presentation there and hypoxic requiring Bipap. He was started on bumex drip, then transitioned to IVP lasix 80mg BID, then had to be transitioned to diamox due to his course being complicated by contraction alkalosis and hypokalemia. Then had Bumex 2mg BID started as he was still volume overloaded. He is now back to his baseline O2 requirements and was transferred here for evaluation by HF team per recommendation by cardiology at [facility] due to his persistent SOB with minimal exertion.     Patient and wife able to provided collateral. He states that prior to presenting to [facility], his home diuretics were escalated to Lasix 80mg BID (PTA maintenance dose was Lasix 40mg BID). Thought he and his wife admit that he missed a few doses, despite escalation remained volume expanded prompting presenting [facility] ED. He notes that he follows with his primary cardiologist Dr. Jewel Stubbs ([facility]) but has not seen him in office in >1 year and at some point was taken off Entresto (unclear of reason and dose). He notes functionally over past 1-2 months has noted decline and now requires walker for ambulation through his home.       PAST MEDICAL & SURGICAL HISTORY:  HTN (hypertension)  COPD (chronic obstructive pulmonary disease)  HLD (hyperlipidemia)  Insulin dependent type 2 diabetes mellitus  Chronic hypoxic respiratory failure, on home oxygen therapy  Stage 4 chronic kidney disease  Chronic combined systolic and diastolic heart failure  Unspecified atrial fibrillation  S/P CABG x 3  cabg3  in 2003  S/P hernia repair  hernia [id number]  S/P implantation of automatic cardioverter/defibrillator (AICD)          REVIEW OF SYSTEMS  14 point ROS done and found to be negative or noncontributory other than noted in HPI.     MEDICATIONS  (STANDING):  acetaZOLAMIDE Injectable 250 milliGRAM(s) IV Push every 12 hours  atorvastatin 20 milliGRAM(s) Oral at bedtime  buMETAnide Infusion 1 mG/Hr (5 mL/Hr) IV Continuous <Continuous>  dextrose 5%. 1000 milliLiter(s) (50 mL/Hr) IV Continuous <Continuous>  dextrose 5%. 1000 milliLiter(s) (100 mL/Hr) IV Continuous <Continuous>  dextrose 50% Injectable 25 Gram(s) IV Push once  dextrose 50% Injectable 12.5 Gram(s) IV Push once  dextrose 50% Injectable 25 Gram(s) IV Push once  fluticasone propionate/ salmeterol 250-50 MICROgram(s) Diskus 1 Dose(s) Inhalation two times a day  glucagon  Injectable 1 milliGRAM(s) IntraMuscular once  insulin glargine Injectable (LANTUS) 32 Unit(s) SubCutaneous at bedtime  insulin lispro (ADMELOG) corrective regimen sliding scale   SubCutaneous three times a day before meals  insulin lispro Injectable (ADMELOG) 3 Unit(s) SubCutaneous three times a day before meals  metoprolol succinate ER 50 milliGRAM(s) Oral daily  polyethylene glycol 3350 17 Gram(s) Oral two times a day  potassium chloride    Tablet ER 40 milliEquivalent(s) Oral two times a day  rivaroxaban 15 milliGRAM(s) Oral with dinner  tamsulosin 0.4 milliGRAM(s) Oral at bedtime  tiotropium 2.5 MICROgram(s) Inhaler 2 Puff(s) Inhalation daily    MEDICATIONS  (PRN):  acetaminophen     Tablet .. 650 milliGRAM(s) Oral every 6 hours PRN Temp greater or equal to 38C (100.4F), Mild Pain (1 - 3)  albuterol/ipratropium for Nebulization 3 milliLiter(s) Nebulizer every 6 hours PRN Shortness of Breath and/or Wheezing  dextrose Oral Gel 15 Gram(s) Oral once PRN Blood Glucose LESS THAN 70 milliGRAM(s)/deciliter  melatonin 3 milliGRAM(s) Oral at bedtime PRN Insomnia      Allergies  No Known Allergies  Intolerances    SOCIAL HISTORY:    FAMILY HISTORY:  FH: HTN (hypertension)    Vital Signs Last 24 Hrs  T(C): 36.2 (06 Feb 2025 11:13), Max: 37 (05 Feb 2025 20:35)  T(F): 97.1 (06 Feb 2025 11:13), Max: 98.6 (05 Feb 2025 20:35)  HR: 71 (06 Feb 2025 11:13) (70 - 73)  BP: 104/61 (06 Feb 2025 11:13) (104/61 - 121/76)  RR: 18 (06 Feb 2025 11:13) (18 - 20)  SpO2: 96% (06 Feb 2025 11:13) (92% - 98%)    Parameters below as of 06 Feb 2025 11:13  Patient On (Oxygen Delivery Method): nasal cannula  O2 Flow (L/min): 3      PHYSICAL EXAM:  General: No distress. Comfortable.  HEENT: EOM intact.  Neck: JVP grossly elevated  Chest: Clear to auscultation bilaterally  CV: Normal S1 and S2. No murmurs, rub, or gallops. Radial pulses normal, warm peripherally  Abdomen: Soft, non-distended, non-tender  Skin: No rashes or skin breakdown  Extremities: +1-2 LE edema  Neurology: Alert and oriented times three. Sensation intact  Psych: Affect normal          LABS:                        9.8    10.85 )-----------( 215      ( 06 Feb 2025 10:32 )             35.0     02-06    140  |  90[L]  |  67[H]  ----------------------------<  183[H]  3.2[L]   |  40[H]  |  2.49[H]    Ca    9.6      06 Feb 2025 10:32  Mg     2.0     02-06    TPro  7.4  /  Alb  3.6  /  TBili  0.8  /  DBili  x   /  AST  35  /  ALT  16  /  AlkPhos  313[H]  02-06      Urinalysis Basic - ( 06 Feb 2025 10:32 )    Color: x / Appearance: x / SG: x / pH: x  Gluc: 183 mg/dL / Ketone: x  / Bili: x / Urobili: x   Blood: x / Protein: x / Nitrite: x   Leuk Esterase: x / RBC: x / WBC x   Sq Epi: x / Non Sq Epi: x / Bacteria: x

## 2025-02-06 NOTE — CONSULT NOTE ADULT - SUBJECTIVE AND OBJECTIVE BOX
NEPHROLOGY CONSULTATION    CHIEF COMPLAINT: SOB    HPI:  Pt is 80 yo m w/PMH CAD s/p CABG, HF w/ICD, AFib on Xarelto, severe AS s/p TAVR, COPD on 3-4L home O2, CKD 3/4, HTN, HLD, T2DM, and BPH who presents as a transfer from [facility] for HF evaluation. He presented to [place] on 1/10/25 for worsening SOB with increased swelling of LE and abdomen. He was admitted for acute on chronic hypoxic respiratory failure iso severe anemia, COPD/acute on chronic CHF exacerbation. Required Bipap. S/p bumex drip, then transitioned to IVP lasix 80mg BID, then had to be transitioned to diamox due to contraction alkalosis. Transferred to [facility] for evaluation by HF team per recommendation by cardiology at [facility] due to his persistent SOB with minimal exertion. Here he hemodynamically stable, saturating 92% on 3L NC. States that he isn't feeling any SOB at this time. Says his legs are still swollen but they are much better than when he was admitted to [facility]. AO, comfortable. No CP, N/V/D/C/F/C.    ROS:  as above    Allergies:  No Known Allergies    PAST MEDICAL & SURGICAL HISTORY:  HTN (hypertension)  COPD (chronic obstructive pulmonary disease)  HLD (hyperlipidemia)  Insulin dependent type 2 diabetes mellitus  Chronic hypoxic respiratory failure, on home oxygen therapy  Stage 3/4 chronic kidney disease  Chronic combined systolic and diastolic heart failure  Unspecified atrial fibrillation  S/P CABG x 3 in 2003  S/P hernia repair  hernia 2005  S/P implantation of automatic cardioverter/defibrillator (AICD)    SOCIAL HISTORY:  negative    FAMILY HISTORY:  HTN (hypertension)    MEDICATIONS  (STANDING):  acetaZOLAMIDE Injectable 250 milliGRAM(s) IV Push every 12 hours  atorvastatin 20 milliGRAM(s) Oral at bedtime  buMETAnide Infusion 1 mG/Hr (5 mL/Hr) IV Continuous <Continuous>  dextrose 5%. 1000 milliLiter(s) (50 mL/Hr) IV Continuous <Continuous>  dextrose 5%. 1000 milliLiter(s) (100 mL/Hr) IV Continuous <Continuous>  dextrose 50% Injectable 25 Gram(s) IV Push once  dextrose 50% Injectable 12.5 Gram(s) IV Push once  dextrose 50% Injectable 25 Gram(s) IV Push once  fluticasone propionate/ salmeterol 250-50 MICROgram(s) Diskus 1 Dose(s) Inhalation two times a day  glucagon  Injectable 1 milliGRAM(s) IntraMuscular once  insulin glargine Injectable (LANTUS) 32 Unit(s) SubCutaneous at bedtime  insulin lispro (ADMELOG) corrective regimen sliding scale   SubCutaneous three times a day before meals  insulin lispro Injectable (ADMELOG) 3 Unit(s) SubCutaneous three times a day before meals  metoprolol succinate ER 50 milliGRAM(s) Oral daily  polyethylene glycol 3350 17 Gram(s) Oral two times a day  potassium chloride    Tablet ER 40 milliEquivalent(s) Oral two times a day  rivaroxaban 15 milliGRAM(s) Oral with dinner  tamsulosin 0.4 milliGRAM(s) Oral at bedtime  tiotropium 2.5 MICROgram(s) Inhaler 2 Puff(s) Inhalation daily    Vital Signs Last 24 Hrs  T(C): 36.2 (02-06-25 @ 11:13), Max: 37 (02-05-25 @ 20:35)  T(F): 97.1 (02-06-25 @ 11:13), Max: 98.6 (02-05-25 @ 20:35)  HR: 71 (02-06-25 @ 11:13) (70 - 73)  BP: 104/61 (02-06-25 @ 11:13) (104/61 - 121/76)  RR: 18 (02-06-25 @ 11:13) (18 - 20)  SpO2: 96% (02-06-25 @ 11:13) (92% - 98%)    I&O's Detail    05 Feb 2025 07:01  -  06 Feb 2025 07:00  --------------------------------------------------------  OUT:    Voided (mL): 800 mL  Total OUT: 800 mL    s1s2  b/l air entry  soft, ND  sm edema b/l LE     LABS:                        9.8    10.85 )-----------( 215      ( 06 Feb 2025 10:32 )             35.0     02-06    140  |  90[L]  |  67[H]  ----------------------------<  183[H]  3.2[L]   |  40[H]  |  2.49[H]    Ca    9.6      06 Feb 2025 10:32  Mg     2.0     02-06    TPro  7.4  /  Alb  3.6  /  TBili  0.8  /  DBili  x   /  AST  35  /  ALT  16  /  AlkPhos  313[H]  02-06       A/P:    CM, EF 25 - 30%, mod TR  Adm w/LIJ VS 1/10 w/fluid overload   DENISHA/CKD 3/4 (baseline Cr ~ 2. - 2.5)  Contraction alkalosis iso diuresis  Overall stable renal indices  Agree w/Diamox  Diuresis, further management per HF team  Suppl K  F/u BMP, Mg, UO  Avoid nephrotoxins as able  D/w family at bedside     [phone]

## 2025-02-06 NOTE — CONSULT NOTE ADULT - ASSESSMENT
80 y/o male w/ PMHx CAD s/p CABG, HFrEF (LVIDd 5.2, LVEF 25-30%) w/ DC- ICD, AFib (Xarelto), severe AS s/p TAVR, COPD on 3-4L home O2, CKD3, HTN, HLD, T2DM, and BPH presented to [facility] for ADHF, despite escalation of home diuretics. Now transferred to [facility] for refractory volume overload. His labs today notable for stable Scr (though unclear baseline). On exam he appears grossly volume expanded and is warm peripherally.       Cardiac Studies  TTE 8/8/24; LVIDd 5.2, EF 25-30%, global hypokinesis, severe grade III DD, TASPE 1.1, TAVR present in AV position, mld-mod MR, mod TR, PASP 53

## 2025-02-07 ENCOUNTER — RESULT REVIEW (OUTPATIENT)
Age: 80
End: 2025-02-07

## 2025-02-07 LAB
ALBUMIN SERPL ELPH-MCNC: 3.7 G/DL — SIGNIFICANT CHANGE UP (ref 3.3–5)
ALP SERPL-CCNC: 282 U/L — HIGH (ref 40–120)
ALT FLD-CCNC: 12 U/L — SIGNIFICANT CHANGE UP (ref 10–45)
ANION GAP SERPL CALC-SCNC: 12 MMOL/L — SIGNIFICANT CHANGE UP (ref 5–17)
ANION GAP SERPL CALC-SCNC: 14 MMOL/L — SIGNIFICANT CHANGE UP (ref 5–17)
AST SERPL-CCNC: 30 U/L — SIGNIFICANT CHANGE UP (ref 10–40)
BILIRUB SERPL-MCNC: 0.8 MG/DL — SIGNIFICANT CHANGE UP (ref 0.2–1.2)
BUN SERPL-MCNC: 60 MG/DL — HIGH (ref 7–23)
BUN SERPL-MCNC: 63 MG/DL — HIGH (ref 7–23)
CALCIUM SERPL-MCNC: 9.4 MG/DL — SIGNIFICANT CHANGE UP (ref 8.4–10.5)
CALCIUM SERPL-MCNC: 9.8 MG/DL — SIGNIFICANT CHANGE UP (ref 8.4–10.5)
CHLORIDE SERPL-SCNC: 91 MMOL/L — LOW (ref 96–108)
CHLORIDE SERPL-SCNC: 92 MMOL/L — LOW (ref 96–108)
CO2 SERPL-SCNC: 37 MMOL/L — HIGH (ref 22–31)
CO2 SERPL-SCNC: 39 MMOL/L — HIGH (ref 22–31)
CREAT SERPL-MCNC: 2.34 MG/DL — HIGH (ref 0.5–1.3)
CREAT SERPL-MCNC: 2.37 MG/DL — HIGH (ref 0.5–1.3)
EGFR: 27 ML/MIN/1.73M2 — LOW
GAS PNL BLDV: SIGNIFICANT CHANGE UP
GLUCOSE BLDC GLUCOMTR-MCNC: 116 MG/DL — HIGH (ref 70–99)
GLUCOSE BLDC GLUCOMTR-MCNC: 163 MG/DL — HIGH (ref 70–99)
GLUCOSE BLDC GLUCOMTR-MCNC: 193 MG/DL — HIGH (ref 70–99)
GLUCOSE BLDC GLUCOMTR-MCNC: 251 MG/DL — HIGH (ref 70–99)
GLUCOSE SERPL-MCNC: 133 MG/DL — HIGH (ref 70–99)
GLUCOSE SERPL-MCNC: 163 MG/DL — HIGH (ref 70–99)
HCT VFR BLD CALC: 36.3 % — LOW (ref 39–50)
HGB BLD-MCNC: 10 G/DL — LOW (ref 13–17)
LACTATE SERPL-SCNC: 1.2 MMOL/L — SIGNIFICANT CHANGE UP (ref 0.5–2)
MAGNESIUM SERPL-MCNC: 2.2 MG/DL — SIGNIFICANT CHANGE UP (ref 1.6–2.6)
MCHC RBC-ENTMCNC: 26.1 PG — LOW (ref 27–34)
MCHC RBC-ENTMCNC: 27.5 G/DL — LOW (ref 32–36)
MCV RBC AUTO: 94.8 FL — SIGNIFICANT CHANGE UP (ref 80–100)
NRBC # BLD: 0 /100 WBCS — SIGNIFICANT CHANGE UP (ref 0–0)
NRBC BLD-RTO: 0 /100 WBCS — SIGNIFICANT CHANGE UP (ref 0–0)
NT-PROBNP SERPL-SCNC: 8099 PG/ML — HIGH (ref 0–300)
PHOSPHATE SERPL-MCNC: 4.5 MG/DL — SIGNIFICANT CHANGE UP (ref 2.5–4.5)
PLATELET # BLD AUTO: 233 K/UL — SIGNIFICANT CHANGE UP (ref 150–400)
POTASSIUM SERPL-MCNC: 3 MMOL/L — LOW (ref 3.5–5.3)
POTASSIUM SERPL-MCNC: 3.7 MMOL/L — SIGNIFICANT CHANGE UP (ref 3.5–5.3)
POTASSIUM SERPL-SCNC: 3 MMOL/L — LOW (ref 3.5–5.3)
POTASSIUM SERPL-SCNC: 3.7 MMOL/L — SIGNIFICANT CHANGE UP (ref 3.5–5.3)
PROT SERPL-MCNC: 7.6 G/DL — SIGNIFICANT CHANGE UP (ref 6–8.3)
RBC # BLD: 3.83 M/UL — LOW (ref 4.2–5.8)
RBC # FLD: 24.9 % — HIGH (ref 10.3–14.5)
SODIUM SERPL-SCNC: 141 MMOL/L — SIGNIFICANT CHANGE UP (ref 135–145)
SODIUM SERPL-SCNC: 144 MMOL/L — SIGNIFICANT CHANGE UP (ref 135–145)
WBC # BLD: 10.53 K/UL — HIGH (ref 3.8–10.5)
WBC # FLD AUTO: 10.53 K/UL — HIGH (ref 3.8–10.5)

## 2025-02-07 PROCEDURE — 93284 PRGRMG EVAL IMPLANTABLE DFB: CPT | Mod: 26

## 2025-02-07 PROCEDURE — 93306 TTE W/DOPPLER COMPLETE: CPT | Mod: 26

## 2025-02-07 PROCEDURE — 71045 X-RAY EXAM CHEST 1 VIEW: CPT | Mod: 26

## 2025-02-07 PROCEDURE — 99233 SBSQ HOSP IP/OBS HIGH 50: CPT

## 2025-02-07 PROCEDURE — 99232 SBSQ HOSP IP/OBS MODERATE 35: CPT

## 2025-02-07 PROCEDURE — 93010 ELECTROCARDIOGRAM REPORT: CPT

## 2025-02-07 RX ORDER — ISOSORBDIE DINITRATE 30 MG/1
10 TABLET ORAL THREE TIMES A DAY
Refills: 0 | Status: DISCONTINUED | OUTPATIENT
Start: 2025-02-08 | End: 2025-02-11

## 2025-02-07 RX ORDER — ASPIRIN 325 MG
81 TABLET ORAL DAILY
Refills: 0 | Status: DISCONTINUED | OUTPATIENT
Start: 2025-02-07 | End: 2025-02-27

## 2025-02-07 RX ADMIN — INSULIN LISPRO 2: 100 INJECTION, SOLUTION INTRAVENOUS; SUBCUTANEOUS at 09:05

## 2025-02-07 RX ADMIN — ATORVASTATIN CALCIUM 20 MILLIGRAM(S): 80 TABLET, FILM COATED ORAL at 21:42

## 2025-02-07 RX ADMIN — Medication 1 DOSE(S): at 18:12

## 2025-02-07 RX ADMIN — INSULIN LISPRO 3 UNIT(S): 100 INJECTION, SOLUTION INTRAVENOUS; SUBCUTANEOUS at 18:11

## 2025-02-07 RX ADMIN — INSULIN LISPRO 3 UNIT(S): 100 INJECTION, SOLUTION INTRAVENOUS; SUBCUTANEOUS at 12:50

## 2025-02-07 RX ADMIN — RIVAROXABAN 15 MILLIGRAM(S): 10 TABLET, FILM COATED ORAL at 18:11

## 2025-02-07 RX ADMIN — Medication 1 DOSE(S): at 05:30

## 2025-02-07 RX ADMIN — Medication 40 MILLIEQUIVALENT(S): at 05:30

## 2025-02-07 RX ADMIN — TAMSULOSIN HYDROCHLORIDE 0.4 MILLIGRAM(S): 0.4 CAPSULE ORAL at 21:42

## 2025-02-07 RX ADMIN — INSULIN LISPRO 3 UNIT(S): 100 INJECTION, SOLUTION INTRAVENOUS; SUBCUTANEOUS at 09:07

## 2025-02-07 RX ADMIN — ACETAZOLAMIDE 250 MILLIGRAM(S): 250 TABLET ORAL at 05:30

## 2025-02-07 RX ADMIN — Medication 40 MILLIEQUIVALENT(S): at 10:19

## 2025-02-07 RX ADMIN — Medication 100 MILLIEQUIVALENT(S): at 10:20

## 2025-02-07 RX ADMIN — Medication 40 MILLIEQUIVALENT(S): at 12:28

## 2025-02-07 RX ADMIN — ACETAZOLAMIDE 250 MILLIGRAM(S): 250 TABLET ORAL at 18:10

## 2025-02-07 RX ADMIN — Medication 10 MILLIGRAM(S): at 21:42

## 2025-02-07 RX ADMIN — TIOTROPIUM BROMIDE INHALATION SPRAY 2 PUFF(S): 3.12 SPRAY, METERED RESPIRATORY (INHALATION) at 12:52

## 2025-02-07 RX ADMIN — INSULIN LISPRO 6: 100 INJECTION, SOLUTION INTRAVENOUS; SUBCUTANEOUS at 12:50

## 2025-02-07 RX ADMIN — Medication 40 MILLIEQUIVALENT(S): at 21:42

## 2025-02-07 RX ADMIN — METOPROLOL SUCCINATE 50 MILLIGRAM(S): 50 TABLET, EXTENDED RELEASE ORAL at 05:30

## 2025-02-07 RX ADMIN — INSULIN GLARGINE-YFGN 32 UNIT(S): 100 INJECTION, SOLUTION SUBCUTANEOUS at 21:41

## 2025-02-07 NOTE — DIETITIAN INITIAL EVALUATION ADULT - SIGNS/SYMPTOMS
meeting <75% of estimated nutritional needs x 1 week PTA pt and family with several diet related questions, diet recall

## 2025-02-07 NOTE — DIETITIAN INITIAL EVALUATION ADULT - ENERGY INTAKE
Per wife, patient noted with slightly improved appetite/PO intake, lunch tray observed at the bedside. Pt consumes most of his meals during time of visit. Pt likely meeting ~75% of estimated nutritional needs. Amenable to trial Glucerna 1x daily to optimize PO intake. Encourage use of daily menus. Honor dietary preferences as expressed as able. Food preferences obtained, will honor as able. Pt and wife made aware RD remains available and will follow up for any additional questions/concerns and per protocol.  Fair (50-75%)

## 2025-02-07 NOTE — DIETITIAN INITIAL EVALUATION ADULT - OTHER INFO
- Endo: hx of diabetes, A1C(1/2025)7.2%, Finger stick x 24hrs: 130-251mg/dL, ordered for Lantus 32 units, insulin lispro (3 units 3x before meals)  - Renal: chronic kidney disease stage 3, hypokalemia noted, repleted  - GI: ordered for miralax

## 2025-02-07 NOTE — PROGRESS NOTE ADULT - PROBLEM SELECTOR PLAN 1
: Etiology likely ischemic. Primary cardiologist Dr. Jewel Stubbs ([facility]), last TTE LVEF 25-30%. Appears has been on some GDMT as outpt (?Farxiga, Coreg and previously on ARNI though unclear why stopped)  - Currently on Toprol 25mg QD; Will defer escalation while in decompensated state  -Will defer ARB/ARNI/SGLT2I/MRA for now gien degree of renal dysfunction. Can introduce afterload reducing agents with PO HDZN/ISDN if BP permits.  - Would stop PO Bumex and transition to continuos Bumex infusion today; Please aggressively replete K+ prior/concurrently.   - Has ICD in place; Currently a DNR/DNI. Would engage palliative for clarification on GOC in regards to device.   - Trend twice daily BMP, LFTs, while on continuos loop diuretics  - Aggressive PT  - Daily Standing weight  - Strict I/O's  - Target electrolyte repletion to target K+ >4.0 and Mg >2.0. : Etiology likely ischemic. Primary cardiologist Dr. Jewel Stubbs ([facility]), last TTE LVEF 25-30%. Appears has been on some GDMT as outpt (?Farxiga, Coreg and previously on ARNI though unclear why stopped)  - Currently on Toprol 25mg QD; Will defer escalation while in decompensated state  -Will defer ARB/ARNI/SGLT2I/MRA for now gien degree of renal dysfunction. Can introduce afterload reducing agents with PO HDZN/ISDN if BP permits.  -Diuretics: c/w bumex gtt 1mg/hr. PLEASE REPLENISH K>4 and Mg>2  - Has ICD in place; Currently a DNR/DNI. Would engage palliative for clarification on GOC in regards to device.   - Trend twice daily BMP, LFTs, while on continuos loop diuretics  - Aggressive PT  - Daily Standing weight  - Strict I/O's  - Target electrolyte repletion to target K+ >4.0 and Mg >2.0.

## 2025-02-07 NOTE — DIETITIAN INITIAL EVALUATION ADULT - ORAL INTAKE PTA/DIET HISTORY
Per discussion with wife. Patient has normal/good appetite/PO intake at baseline. Usually consumes 3 meals daily. Wife prepares meals daily at home. Follows a low sodium dietary restrictions. Confirms no known food allergies. Denies micronutrient supplement uses, reports drinks Glucerna 1x daily. Denies chewing/swallowing difficulties. No nausea/vomiting reported. States having regular bowel movements daily at home.

## 2025-02-07 NOTE — DIETITIAN INITIAL EVALUATION ADULT - NSFNSADHERENCEPTAFT_GEN_A_CORE
- Wife reports patient noted with decreased appetite/PO intake x 1 week in setting of recent hospitalization.

## 2025-02-07 NOTE — DIETITIAN INITIAL EVALUATION ADULT - PROBLEM SELECTOR PLAN 3
- US doppler done for LUE swelling at S found non-occlusive thrombus within the brachial and basilic vein   - Continue on Xarelto

## 2025-02-07 NOTE — DIETITIAN INITIAL EVALUATION ADULT - PROBLEM SELECTOR PLAN 8
- Hb currently 9.9, was 6.5 on admission to [facility]  - Patient apparently had some melena at home, intermittent NSAID use  - GI consulted at [facility], recommended pantoprazole 40mg BID and holding off endoscopy for now due to his respiratory status

## 2025-02-07 NOTE — PROGRESS NOTE ADULT - SUBJECTIVE AND OBJECTIVE BOX
Patient is a 79y old  Male who presents with a chief complaint of Heart failure     (07 Feb 2025 14:21)      SUBJECTIVE / OVERNIGHT EVENTS: Patient had no acute events overnight. Patient seen and examined at bedside this morning. Tolerating cpap. Had BM. sob unchanged.     ROS: [ - ] Fever [ - ] Chills [ - ] Nausea/Vomiting [ - ] Chest Pain [ - ] Shortness of breath     MEDICATIONS  (STANDING):  acetaZOLAMIDE Injectable 250 milliGRAM(s) IV Push every 12 hours  atorvastatin 20 milliGRAM(s) Oral at bedtime  buMETAnide Infusion 1 mG/Hr (5 mL/Hr) IV Continuous <Continuous>  dextrose 5%. 1000 milliLiter(s) (50 mL/Hr) IV Continuous <Continuous>  dextrose 5%. 1000 milliLiter(s) (100 mL/Hr) IV Continuous <Continuous>  dextrose 50% Injectable 25 Gram(s) IV Push once  dextrose 50% Injectable 12.5 Gram(s) IV Push once  dextrose 50% Injectable 25 Gram(s) IV Push once  fluticasone propionate/ salmeterol 250-50 MICROgram(s) Diskus 1 Dose(s) Inhalation two times a day  glucagon  Injectable 1 milliGRAM(s) IntraMuscular once  insulin glargine Injectable (LANTUS) 32 Unit(s) SubCutaneous at bedtime  insulin lispro (ADMELOG) corrective regimen sliding scale   SubCutaneous three times a day before meals  insulin lispro Injectable (ADMELOG) 3 Unit(s) SubCutaneous three times a day before meals  metoprolol succinate ER 50 milliGRAM(s) Oral daily  polyethylene glycol 3350 17 Gram(s) Oral two times a day  potassium chloride    Tablet ER 40 milliEquivalent(s) Oral two times a day  rivaroxaban 15 milliGRAM(s) Oral with dinner  tamsulosin 0.4 milliGRAM(s) Oral at bedtime  tiotropium 2.5 MICROgram(s) Inhaler 2 Puff(s) Inhalation daily    MEDICATIONS  (PRN):  acetaminophen     Tablet .. 650 milliGRAM(s) Oral every 6 hours PRN Temp greater or equal to 38C (100.4F), Mild Pain (1 - 3)  albuterol/ipratropium for Nebulization 3 milliLiter(s) Nebulizer every 6 hours PRN Shortness of Breath and/or Wheezing  dextrose Oral Gel 15 Gram(s) Oral once PRN Blood Glucose LESS THAN 70 milliGRAM(s)/deciliter  melatonin 3 milliGRAM(s) Oral at bedtime PRN Insomnia      Vital Signs Last 24 Hrs  T(C): 36.4 (07 Feb 2025 12:17), Max: 36.6 (06 Feb 2025 20:28)  T(F): 97.6 (07 Feb 2025 12:17), Max: 97.9 (06 Feb 2025 20:28)  HR: 73 (07 Feb 2025 14:23) (69 - 79)  BP: 100/60 (07 Feb 2025 12:17) (100/60 - 133/77)  BP(mean): --  RR: 18 (07 Feb 2025 12:17) (18 - 19)  SpO2: 93% (07 Feb 2025 14:23) (92% - 99%)    Parameters below as of 07 Feb 2025 12:17  Patient On (Oxygen Delivery Method): nasal cannula  O2 Flow (L/min): 2    CAPILLARY BLOOD GLUCOSE      POCT Blood Glucose.: 251 mg/dL (07 Feb 2025 12:38)  POCT Blood Glucose.: 163 mg/dL (07 Feb 2025 08:54)  POCT Blood Glucose.: 172 mg/dL (06 Feb 2025 21:06)  POCT Blood Glucose.: 130 mg/dL (06 Feb 2025 18:35)    I&O's Summary    06 Feb 2025 07:01  -  07 Feb 2025 07:00  --------------------------------------------------------  IN: 360 mL / OUT: 800 mL / NET: -440 mL    07 Feb 2025 07:01  -  07 Feb 2025 14:57  --------------------------------------------------------  IN: 160 mL / OUT: 2000 mL / NET: -1840 mL        PHYSICAL EXAM  GENERAL: NAD, lying comfortably in bed, overloaded on 3LNC  HEENT:  Atraumatic, Normocephalic, EOMI, conjunctiva and sclera clear,   CHEST/LUNG: decreased breath sounds  HEART: RRR, S1 and S2 No murmurs, rubs, or gallops  ABDOMEN: Soft, distended, nontender. nontympanic  EXTREMITIES:  3+ LE edema  NEURO: AAOx3, non-focal  SKIN: No rashes or lesions    LABS:                        10.0   10.53 )-----------( 233      ( 07 Feb 2025 07:04 )             36.3     02-07    144  |  91[L]  |  63[H]  ----------------------------<  133[H]  3.0[L]   |  39[H]  |  2.37[H]    Ca    9.8      07 Feb 2025 06:50  Phos  4.5     02-07  Mg     2.2     02-07    TPro  7.6  /  Alb  3.7  /  TBili  0.8  /  DBili  x   /  AST  30  /  ALT  12  /  AlkPhos  282[H]  02-07          Urinalysis Basic - ( 07 Feb 2025 06:50 )    Color: x / Appearance: x / SG: x / pH: x  Gluc: 133 mg/dL / Ketone: x  / Bili: x / Urobili: x   Blood: x / Protein: x / Nitrite: x   Leuk Esterase: x / RBC: x / WBC x   Sq Epi: x / Non Sq Epi: x / Bacteria: x          RADIOLOGY & ADDITIONAL TESTS:  < from: TTE W or WO Ultrasound Enhancing Agent (02.07.25 @ 10:56) >     CONCLUSIONS:      1. Definity ultrasound enhancing agent was given for enhanced left ventricular opacification and improved delineation of the left ventricular endocardial borders.   2. Left ventricular wall thickness is normal. Left ventricular systolic function is severely decreased with an ejection fraction visually estimated at 30 %. Global left ventricular hypokinesis.   3. Multiple segmental abnormalities exist. See findings.   4. At least "moderate" mitral regurgitation.   5. 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 Trace intravalvular regurgitation.   6. Severely enlarged right ventricular cavity size and reduced right ventricular systolic function.   7. Probably severe tricuspid regurgitation.   8. Devicelead is visualized in the right heart.      < end of copied text >      Labs Personally Reviewed  Imaging Personally Reviewed  Consultant(s) Notes Reviewed

## 2025-02-07 NOTE — PROGRESS NOTE ADULT - ASSESSMENT
78 y/o male w/ PMHx CAD s/p CABG, HFrEF (LVIDd 5.2, LVEF 25-30%) w/ DC- ICD, AFib (Xarelto), severe AS s/p TAVR, COPD on 3-4L home O2, CKD3, HTN, HLD, T2DM, and BPH presented to [facility] for ADHF, despite escalation of home diuretics. Now transferred to [facility] for refractory volume overload. His labs today notable for stable Scr (though unclear baseline). On exam he appears grossly volume expanded and is warm peripherally.       Cardiac Studies  TTE 8/8/24; LVIDd 5.2, EF 25-30%, global hypokinesis, severe grade III DD, TASPE 1.1, TAVR present in AV position, mld-mod MR, mod TR, PASP 53 80 y/o male w/ PMHx CAD s/p CABG, HFrEF (LVIDd 5.2, LVEF 25-30%) w/ DC- ICD, AFib (Xarelto), severe AS s/p TAVR, COPD on 3-4L home O2, CKD3, HTN, HLD, T2DM, and BPH presented to [facility] for ADHF, despite escalation of home diuretics. Now transferred to [facility] for refractory volume overload. His labs notable for stable Scr (though unclear baseline).     On exam he appears grossly volume expanded and is warm peripherally. Repeat TTE shows severe BiV dysfunction. Will continue to diurese him with bumex gtt.    Cardiac Studies  TTE 2/7/25 LVIDd 5.3cm, LVEF 30%, no LV thrombus, TAPSE 1.0cm, severely enlarged RV size/RVSF reduced, mod dilated LA, severe dilated RA, TAVR present, mod MR, severe TR, PASP 52, PASP 52, no pericardial effusion, IVC dilated 2.35cm  TTE 8/8/24; LVIDd 5.2, EF 25-30%, global hypokinesis, severe grade III DD, TASPE 1.1, TAVR present in AV position, mld-mod MR, mod TR, PASP 53

## 2025-02-07 NOTE — PROGRESS NOTE ADULT - SUBJECTIVE AND OBJECTIVE BOX
No distress    Vital Signs Last 24 Hrs  T(C): 36.3 (02-07-25 @ 21:03), Max: 36.4 (02-07-25 @ 05:14)  T(F): 97.3 (02-07-25 @ 21:03), Max: 97.6 (02-07-25 @ 12:17)  HR: 70 (02-07-25 @ 21:03) (69 - 79)  BP: 111/73 (02-07-25 @ 21:03) (100/60 - 130/74)  RR: 18 (02-07-25 @ 21:03) (18 - 19)  SpO2: 96% (02-07-25 @ 21:03) (92% - 99%)    I&O's Detail    06 Feb 2025 07:01  -  07 Feb 2025 07:00  --------------------------------------------------------  OUT:    Voided (mL): 800 mL  Total OUT: 800 mL    07 Feb 2025 07:01  -  07 Feb 2025 21:59  --------------------------------------------------------  OUT:    Voided (mL): 2000 mL  Total OUT: 2000 mL    s1s2  b/l air entry  soft, ND  sm edema                         10.0   10.53 )-----------( 233      ( 07 Feb 2025 07:04 )             36.3     07 Feb 2025 15:13    141    |  92     |  60     ----------------------------<  163    3.7     |  37     |  2.34     Ca    9.4        07 Feb 2025 15:13  Phos  4.5       07 Feb 2025 06:50  Mg     2.2       07 Feb 2025 06:50    TPro  7.6    /  Alb  3.7    /  TBili  0.8    /  DBili  x      /  AST  30     /  ALT  12     /  AlkPhos  282    07 Feb 2025 06:50    LIVER FUNCTIONS - ( 07 Feb 2025 06:50 )  Alb: 3.7 g/dL / Pro: 7.6 g/dL / ALK PHOS: 282 U/L / ALT: 12 U/L / AST: 30 U/L / GGT: x           acetaminophen     Tablet .. 650 milliGRAM(s) Oral every 6 hours PRN  acetaZOLAMIDE Injectable 250 milliGRAM(s) IV Push every 12 hours  albuterol/ipratropium for Nebulization 3 milliLiter(s) Nebulizer every 6 hours PRN  atorvastatin 20 milliGRAM(s) Oral at bedtime  buMETAnide Infusion 1 mG/Hr IV Continuous <Continuous>  dextrose 5%. 1000 milliLiter(s) IV Continuous <Continuous>  dextrose 5%. 1000 milliLiter(s) IV Continuous <Continuous>  dextrose 50% Injectable 25 Gram(s) IV Push once  dextrose 50% Injectable 12.5 Gram(s) IV Push once  dextrose 50% Injectable 25 Gram(s) IV Push once  dextrose Oral Gel 15 Gram(s) Oral once PRN  fluticasone propionate/ salmeterol 250-50 MICROgram(s) Diskus 1 Dose(s) Inhalation two times a day  glucagon  Injectable 1 milliGRAM(s) IntraMuscular once  hydrALAZINE 10 milliGRAM(s) Oral three times a day  insulin glargine Injectable (LANTUS) 32 Unit(s) SubCutaneous at bedtime  insulin lispro (ADMELOG) corrective regimen sliding scale   SubCutaneous three times a day before meals  insulin lispro Injectable (ADMELOG) 3 Unit(s) SubCutaneous three times a day before meals  melatonin 3 milliGRAM(s) Oral at bedtime PRN  metoprolol succinate ER 50 milliGRAM(s) Oral daily  polyethylene glycol 3350 17 Gram(s) Oral two times a day  potassium chloride    Tablet ER 40 milliEquivalent(s) Oral three times a day  rivaroxaban 15 milliGRAM(s) Oral with dinner  tamsulosin 0.4 milliGRAM(s) Oral at bedtime  tiotropium 2.5 MICROgram(s) Inhaler 2 Puff(s) Inhalation daily    A/P:    CM, EF 25 - 30%, mod TR  Adm w/LIJ VS 1/10 w/fluid overload   DENISHA/CKD 3/4 (baseline Cr ~ 2. - 2.5)  Contraction alkalosis iso diuresis  Overall stable renal indices  Agree w/Diamox  Bumex qtt per HF team  F/u BMP, Mg, UO  Avoid nephrotoxins as able    [phone]

## 2025-02-07 NOTE — DIETITIAN INITIAL EVALUATION ADULT - PERSON TAUGHT/METHOD
Discussed DASH diet education. Reviewed foods high in Na and cholesterol to avoid. Reviewed ways to decrease Na in your diet, discussed meal and snack options, tips for eating out, low cholesterol options, and shopping/label reading tips. Good comprehension noted.   Emphasized the importance of adequate kcal and protein intake; recommend to optimize nutritional intake in case of decreased appetite; recommended small frequent meals by ordering nutrient-dense snacks and leaving non-perishable food away from tray for later consumption during the day or between meals; to start with protein, and sips of supplement throughout the day; reviewed foods with protein and menu order procedures in hospital. Pt and family made aware RD remains available and will follow up for any additional questions/concerns and per protocol./verbal instruction/patient instructed/spouse instructed

## 2025-02-07 NOTE — PROGRESS NOTE ADULT - SUBJECTIVE AND OBJECTIVE BOX
Subjective:    Medications:  acetaminophen     Tablet .. 650 milliGRAM(s) Oral every 6 hours PRN  acetaZOLAMIDE Injectable 250 milliGRAM(s) IV Push every 12 hours  albuterol/ipratropium for Nebulization 3 milliLiter(s) Nebulizer every 6 hours PRN  atorvastatin 20 milliGRAM(s) Oral at bedtime  buMETAnide Infusion 1 mG/Hr IV Continuous <Continuous>  dextrose 5%. 1000 milliLiter(s) IV Continuous <Continuous>  dextrose 5%. 1000 milliLiter(s) IV Continuous <Continuous>  dextrose 50% Injectable 25 Gram(s) IV Push once  dextrose 50% Injectable 12.5 Gram(s) IV Push once  dextrose 50% Injectable 25 Gram(s) IV Push once  dextrose Oral Gel 15 Gram(s) Oral once PRN  fluticasone propionate/ salmeterol 250-50 MICROgram(s) Diskus 1 Dose(s) Inhalation two times a day  glucagon  Injectable 1 milliGRAM(s) IntraMuscular once  insulin glargine Injectable (LANTUS) 32 Unit(s) SubCutaneous at bedtime  insulin lispro (ADMELOG) corrective regimen sliding scale   SubCutaneous three times a day before meals  insulin lispro Injectable (ADMELOG) 3 Unit(s) SubCutaneous three times a day before meals  melatonin 3 milliGRAM(s) Oral at bedtime PRN  metoprolol succinate ER 50 milliGRAM(s) Oral daily  polyethylene glycol 3350 17 Gram(s) Oral two times a day  potassium chloride    Tablet ER 40 milliEquivalent(s) Oral two times a day  rivaroxaban 15 milliGRAM(s) Oral with dinner  tamsulosin 0.4 milliGRAM(s) Oral at bedtime  tiotropium 2.5 MICROgram(s) Inhaler 2 Puff(s) Inhalation daily      Physical Exam:    Vitals:  Vital Signs Last 24 Hours  T(C): 36.4 (02-07-25 @ 12:17), Max: 36.6 (02-06-25 @ 20:28)  HR: 73 (02-07-25 @ 12:17) (69 - 79)  BP: 100/60 (02-07-25 @ 12:17) (100/60 - 133/77)  RR: 18 (02-07-25 @ 12:17) (18 - 19)  SpO2: 92% (02-07-25 @ 12:17) (92% - 99%)        I&O's Summary    06 Feb 2025 07:01  -  07 Feb 2025 07:00  --------------------------------------------------------  IN: 360 mL / OUT: 800 mL / NET: -440 mL    07 Feb 2025 07:01  -  07 Feb 2025 12:45  --------------------------------------------------------  IN: 0 mL / OUT: 800 mL / NET: -800 mL        Tele:    General: No distress. Comfortable.  HEENT: EOM intact.  Neck: Neck supple. JVP not elevated. No masses  Chest: Clear to auscultation bilaterally  CV: Normal S1 and S2. No murmurs, rub, or gallops. Radial pulses normal.  Abdomen: Soft, non-distended, non-tender  Skin: No rashes or skin breakdown  Neurology: Alert and oriented times three. Sensation intact  Psych: Affect normal    Labs:                        10.0   10.53 )-----------( 233      ( 07 Feb 2025 07:04 )             36.3     02-07    144  |  91[L]  |  63[H]  ----------------------------<  133[H]  3.0[L]   |  39[H]  |  2.37[H]    Ca    9.8      07 Feb 2025 06:50  Phos  4.5     02-07  Mg     2.2     02-07    TPro  7.6  /  Alb  3.7  /  TBili  0.8  /  DBili  x   /  AST  30  /  ALT  12  /  AlkPhos  282[H]  02-07                Lactate, Blood: 1.2 mmol/L (02-07 @ 10:11)     Subjective:  -on bumex gtt    Medications:  acetaminophen     Tablet .. 650 milliGRAM(s) Oral every 6 hours PRN  acetaZOLAMIDE Injectable 250 milliGRAM(s) IV Push every 12 hours  albuterol/ipratropium for Nebulization 3 milliLiter(s) Nebulizer every 6 hours PRN  atorvastatin 20 milliGRAM(s) Oral at bedtime  buMETAnide Infusion 1 mG/Hr IV Continuous <Continuous>  dextrose 5%. 1000 milliLiter(s) IV Continuous <Continuous>  dextrose 5%. 1000 milliLiter(s) IV Continuous <Continuous>  dextrose 50% Injectable 25 Gram(s) IV Push once  dextrose 50% Injectable 12.5 Gram(s) IV Push once  dextrose 50% Injectable 25 Gram(s) IV Push once  dextrose Oral Gel 15 Gram(s) Oral once PRN  fluticasone propionate/ salmeterol 250-50 MICROgram(s) Diskus 1 Dose(s) Inhalation two times a day  glucagon  Injectable 1 milliGRAM(s) IntraMuscular once  insulin glargine Injectable (LANTUS) 32 Unit(s) SubCutaneous at bedtime  insulin lispro (ADMELOG) corrective regimen sliding scale   SubCutaneous three times a day before meals  insulin lispro Injectable (ADMELOG) 3 Unit(s) SubCutaneous three times a day before meals  melatonin 3 milliGRAM(s) Oral at bedtime PRN  metoprolol succinate ER 50 milliGRAM(s) Oral daily  polyethylene glycol 3350 17 Gram(s) Oral two times a day  potassium chloride    Tablet ER 40 milliEquivalent(s) Oral two times a day  rivaroxaban 15 milliGRAM(s) Oral with dinner  tamsulosin 0.4 milliGRAM(s) Oral at bedtime  tiotropium 2.5 MICROgram(s) Inhaler 2 Puff(s) Inhalation daily      Physical Exam:    Vitals:  Vital Signs Last 24 Hours  T(C): 36.4 (02-07-25 @ 12:17), Max: 36.6 (02-06-25 @ 20:28)  HR: 73 (02-07-25 @ 12:17) (69 - 79)  BP: 100/60 (02-07-25 @ 12:17) (100/60 - 133/77)  RR: 18 (02-07-25 @ 12:17) (18 - 19)  SpO2: 92% (02-07-25 @ 12:17) (92% - 99%)        I&O's Summary    06 Feb 2025 07:01  -  07 Feb 2025 07:00  --------------------------------------------------------  IN: 360 mL / OUT: 800 mL / NET: -440 mL    07 Feb 2025 07:01  -  07 Feb 2025 12:45  --------------------------------------------------------  IN: 0 mL / OUT: 800 mL / NET: -800 mL        Tele:    General: No distress. Comfortable.  HEENT: EOM intact.  Neck: Neck supple. JVP elevated  Chest: Clear to auscultation bilaterally  CV: Normal S1 and S2. No murmurs, rub, or gallops. Radial pulses normal.  Abdomen: Soft, non-distended, non-tender  Skin: No rashes or skin breakdown. +BLE   Neurology: Alert and oriented times three. Sensation intact  Psych: Affect normal    Labs:                        10.0   10.53 )-----------( 233      ( 07 Feb 2025 07:04 )             36.3     02-07    144  |  91[L]  |  63[H]  ----------------------------<  133[H]  3.0[L]   |  39[H]  |  2.37[H]    Ca    9.8      07 Feb 2025 06:50  Phos  4.5     02-07  Mg     2.2     02-07    TPro  7.6  /  Alb  3.7  /  TBili  0.8  /  DBili  x   /  AST  30  /  ALT  12  /  AlkPhos  282[H]  02-07                Lactate, Blood: 1.2 mmol/L (02-07 @ 10:11)

## 2025-02-07 NOTE — PROGRESS NOTE ADULT - PROBLEM SELECTOR PLAN 1
CHOOSE ONE: HFpEF + HFrEF  EF 30% with severe tricuspid regurg and PASP 55mmHG on 2/7/24  Heart failure, CHF order set initiated    Guideline directed medical therapy as below: (Name/Dose/Frequency)  - Diuretic:  bumex gtt 1mg/hr, Diamox 250mg BID (PO KCl 40mEq BID for hypokalemia)  - BB:  Metoprolol succinate 50mg daily  - ARNI/ACE-I/ARB: Ramipril 2.5mg daily - hold given DENISHA on CKD  - Hydralazine/Nitrate: Add hydral 10mg TID. Isodril 5mg BID   - MRA: Was on spironolactone last year, will reintroduce pending denisha  - SGLT2: none CHOOSE ONE: HFpEF + HFrEF  EF 30% with severe tricuspid regurg and PASP 55mmHG on 2/7/24  Heart failure, CHF order set initiated    Guideline directed medical therapy as below: (Name/Dose/Frequency)  - Diuretic:  bumex gtt 1mg/hr, Diamox 250mg BID (PO KCl 40mEq BID for hypokalemia)  - BB:  Metoprolol succinate 50mg daily  - ARNI/ACE-I/ARB: Ramipril 2.5mg daily - hold given DENISHA on CKD  - Hydralazine/Nitrate: Add hydral 10mg TID. Isodril 10mg TID   - MRA: Was on spironolactone last year, will reintroduce pending denisha  - SGLT2: none

## 2025-02-07 NOTE — DIETITIAN INITIAL EVALUATION ADULT - ADD RECOMMEND
1. Continue current DASH diet as ordered  2. Recommend Glucerna 1x daily (220kcals, 10g protein) to optimize PO intake  3. Food preferences obtained, will honor as able  4. Encourage PO intake via small frequent and nutrient dense meals   5. Continue to monitor PO intake, weight trend, electrolytes, blood glucose, labs, BMs in-house   6. Education provided as documented below

## 2025-02-07 NOTE — DIETITIAN INITIAL EVALUATION ADULT - NS FNS DIET ORDER
Diet, Regular:   DASH/TLC {Sodium & Cholesterol Restricted} (DASH) (02-06-25 @ 12:00) [Active]

## 2025-02-07 NOTE — PHYSICAL THERAPY INITIAL EVALUATION ADULT - ADDITIONAL COMMENTS
Pt lives with his wife in a private house with 4 steps to enter and 10 steps inside. DME: RW and Home O2. Pt lives with his wife in a private house with 4 steps to enter with handrail  to enter basement where pt resides; pt sleeps in recliner. Pt then has 10 steps inside to go upstairs to main level from basement.  Pt was independent PTA without device, but needed RW for 2 weeks PTA 2/2 decline. Pt was able to climb stairs slowly.  DME: RW and Home O2.

## 2025-02-07 NOTE — DIETITIAN INITIAL EVALUATION ADULT - ETIOLOGY
decreased ability to consume adequate protein-energy intake in setting of reported recent hospitalization  related to lack of exposure to previous diet education/ incomplete exposure to diet education

## 2025-02-07 NOTE — PHYSICAL THERAPY INITIAL EVALUATION ADULT - PERTINENT HX OF CURRENT PROBLEM, REHAB EVAL
78 y/o male w/ PMHx CAD s/p CABG, HF (combined HFrEF [EF 25%] and HFpEF [G3DD]) w/ ICD, AFib on Xarelto, severe AS s/p TAVR, COPD on 3-4L home O2, CKD3, HTN, HLD, T2DM, and BPH who presents as a transfer from [facility] for HF evaluation. He presented to [place] on 1/10/25 for worsening of his chronic SOB for 2 weeks, with associated increased swelling of legs and abdomen. He was admitted for acute on chronic hypoxic respiratory failure due to both anemia and acute on chronic CHF exacerbation. He was anemic to 6.5 on presentation there and hypoxic requiring Bipap. He was started on bumex drip, then transitioned to IVP lasix 80mg BID, then had to be transitioned to diamox due to his course being complicated by contraction alkalosis and hypokalemia. Then had Bumex 2mg BID started as he was still volume overloaded. He is now back to his baseline O2 requirements and was transferred here for evaluation by HF team per recommendation by cardiology at [facility] due to his persistent SOB with minimal exertion. Here he is afebrile, hemodynamically stable, saturating 92% on 3LNC. States that he isn't feeling any SOB at the moment. Says his legs are still swollen but they are much better than when he was admitted to [facility]. 80 y/o male w/ PMHx CAD s/p CABG, HF (combined HFrEF [EF 25%] and HFpEF [G3DD]) w/ ICD, AFib on Xarelto, severe AS s/p TAVR, COPD on 3-4L home O2, CKD3, HTN, HLD, T2DM, and BPH who presents as a transfer from [facility] for HF evaluation. He presented to [place] on 1/10/25 for worsening of his chronic SOB for 2 weeks, with associated increased swelling of legs and abdomen. He was admitted for acute on chronic hypoxic respiratory failure due to both anemia and acute on chronic CHF exacerbation. He was anemic to 6.5 on presentation there and hypoxic requiring Bipap. He was started on bumex drip, then transitioned to IVP lasix 80mg BID, then had to be transitioned to diamox due to his course being complicated by contraction alkalosis and hypokalemia. Then had Bumex 2mg BID started as he was still volume overloaded. He is now back to his baseline O2 requirements and was transferred here for evaluation by HF team per recommendation by cardiology at [facility] due to his persistent SOB with minimal exertion. Here he is afebrile, hemodynamically stable, saturating 92% on 3LNC. States that he isn't feeling any SOB at the moment. Says his legs are still swollen but they are much better than when he was admitted to [facility]. MARCIA: Definity ultrasound enhancing agent was given for enhanced LV opacification and improved delineation of the LV endocardial borders. 2. LV wall thickness is normal. LV systolic function is severely decreased with an EF= 30% Global left ventricular hypokinesis.3. Multiple segmental abnormalities exist. See findings. 4. At least "moderate" MR. 5. 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 Trace intravalvular regurgitation. 6. Severely enlarged RV cavity size and reduced RV systolic function. 7. Probably severe TR. 8. Device lead is visualized in the right heart.

## 2025-02-07 NOTE — PHYSICAL THERAPY INITIAL EVALUATION ADULT - TRANSFER TRAINING, PT EVAL
Goal: Pt will transfer sit to stand with min assist x 1 in 2 weeks. Pt will also transfer bed to wheelchair with min assist x 1 in 2 weeks.

## 2025-02-07 NOTE — PROGRESS NOTE ADULT - ASSESSMENT
80 y/o male w/ PMHx CAD s/p CABG, HF (combined HFrEF [EF 25%] and HFpEF [G3DD]) w/ ICD, AFib on Xarelto, severe AS s/p TAVR, COPD on 3-4L home O2, CKD4, HTN, HLD, T2DM, and BPH who presents as a transfer from [facility] for HF evaluation. Ongoing CHF exacerbation on bumex gtt.

## 2025-02-07 NOTE — DIETITIAN INITIAL EVALUATION ADULT - PERTINENT MEDS FT
MEDICATIONS  (STANDING):  acetaZOLAMIDE Injectable 250 milliGRAM(s) IV Push every 12 hours  atorvastatin 20 milliGRAM(s) Oral at bedtime  buMETAnide Infusion 1 mG/Hr (5 mL/Hr) IV Continuous <Continuous>  dextrose 5%. 1000 milliLiter(s) (50 mL/Hr) IV Continuous <Continuous>  dextrose 5%. 1000 milliLiter(s) (100 mL/Hr) IV Continuous <Continuous>  dextrose 50% Injectable 25 Gram(s) IV Push once  dextrose 50% Injectable 12.5 Gram(s) IV Push once  dextrose 50% Injectable 25 Gram(s) IV Push once  fluticasone propionate/ salmeterol 250-50 MICROgram(s) Diskus 1 Dose(s) Inhalation two times a day  glucagon  Injectable 1 milliGRAM(s) IntraMuscular once  insulin glargine Injectable (LANTUS) 32 Unit(s) SubCutaneous at bedtime  insulin lispro (ADMELOG) corrective regimen sliding scale   SubCutaneous three times a day before meals  insulin lispro Injectable (ADMELOG) 3 Unit(s) SubCutaneous three times a day before meals  metoprolol succinate ER 50 milliGRAM(s) Oral daily  polyethylene glycol 3350 17 Gram(s) Oral two times a day  potassium chloride    Tablet ER 40 milliEquivalent(s) Oral two times a day  rivaroxaban 15 milliGRAM(s) Oral with dinner  tamsulosin 0.4 milliGRAM(s) Oral at bedtime  tiotropium 2.5 MICROgram(s) Inhaler 2 Puff(s) Inhalation daily    MEDICATIONS  (PRN):  acetaminophen     Tablet .. 650 milliGRAM(s) Oral every 6 hours PRN Temp greater or equal to 38C (100.4F), Mild Pain (1 - 3)  albuterol/ipratropium for Nebulization 3 milliLiter(s) Nebulizer every 6 hours PRN Shortness of Breath and/or Wheezing  dextrose Oral Gel 15 Gram(s) Oral once PRN Blood Glucose LESS THAN 70 milliGRAM(s)/deciliter  melatonin 3 milliGRAM(s) Oral at bedtime PRN Insomnia

## 2025-02-07 NOTE — DIETITIAN INITIAL EVALUATION ADULT - NSICDXPASTSURGICALHX_GEN_ALL_CORE_FT
PAST SURGICAL HISTORY:  S/P CABG x 3 cabg3  in 2003    S/P hernia repair hernia [id number]    S/P implantation of automatic cardioverter/defibrillator (AICD)

## 2025-02-07 NOTE — PHYSICAL THERAPY INITIAL EVALUATION ADULT - BED MOBILITY LIMITATIONS, REHAB EVAL
pt has large abdomen/decreased ability to use arms for pushing/pulling/decreased ability to use legs for bridging/pushing/impaired ability to control trunk for mobility

## 2025-02-07 NOTE — DIETITIAN INITIAL EVALUATION ADULT - REASON INDICATOR FOR ASSESSMENT
Patient seen for "Consult for Heart failure education, MAT score 2 or >",Source: Pt and wife at the bedside, Electronic Medical Record. Chart reviewed, events noted.

## 2025-02-07 NOTE — PHYSICAL THERAPY INITIAL EVALUATION ADULT - NSPTDISCHREC_GEN_A_CORE
I pt goes home, pt needs Home PT and HHA for all mobility, wheelchair for long distance and possibly PLD for OOB at present./Sub-acute Rehab

## 2025-02-07 NOTE — DIETITIAN INITIAL EVALUATION ADULT - PHYSCIAL ASSESSMENT
Nutrition focused physical exam not warranted at this time. Wife confirms pt's height to be 5'11''. Wife reports patient's UBW~240lbs,  reports 30lbs weight loss recently secondary to fluid shift per wife due to heart failure.    Per Mata COVARRUBIAS weight history:(1/2025)248lbs; (8/2024)240lbs; (10/2021)223lbs;    IBW: 171lbs  IBW%: 144%

## 2025-02-07 NOTE — PROGRESS NOTE ADULT - PROBLEM SELECTOR PLAN 3
- US doppler done for LUE swelling at S found non-occlusive thrombus within the brachial and basilic vein in Jan 2025  -repeat VA duplex LUE 2/7- resolved thrombus

## 2025-02-07 NOTE — DIETITIAN INITIAL EVALUATION ADULT - PROBLEM SELECTOR PLAN 2
- Baseline Cr in August 1.5-1.81  - Cr in [place] this admission ranged from 1.93 -  3.27, most recently 2.52 today  - Likely DENISHA in setting of cardiorenal syndrome with contraction alkalosis as well  - Continue monitoring Cr and urine output  - Nephrology consult in AM

## 2025-02-07 NOTE — DIETITIAN INITIAL EVALUATION ADULT - PERTINENT LABORATORY DATA
02-07    144  |  91[L]  |  63[H]  ----------------------------<  133[H]  3.0[L]   |  39[H]  |  2.37[H]    Ca    9.8      07 Feb 2025 06:50  Phos  4.5     02-07  Mg     2.2     02-07    TPro  7.6  /  Alb  3.7  /  TBili  0.8  /  DBili  x   /  AST  30  /  ALT  12  /  AlkPhos  282[H]  02-07  POCT Blood Glucose.: 251 mg/dL (02-07-25 @ 12:38)  A1C with Estimated Average Glucose Result: 7.2 % (01-29-25 @ 07:55)  A1C with Estimated Average Glucose Result: 9.0 % (08-08-24 @ 06:50)

## 2025-02-07 NOTE — DIETITIAN INITIAL EVALUATION ADULT - PROBLEM SELECTOR PLAN 1
CHOOSE ONE: HFpEF + HFrEF  EF 25-30% w/ G3DD and PASP 53mmHG on 8/8/24  Heart failure, CHF order set initiated    Guideline directed medical therapy as below: (Name/Dose/Frequency)  - Diuretic: Bumex 2mg BID = Diamox 250mg BID (PO KCl 20mEq BID for hypokalemia)  - BB:  Metoprolol succinate 50mg daily  - ARNI/ACE-I/ARB: Ramipril 2.5mg daily - hold given DENISHA on CKD  - Hydralazine/Nitrate: None  - MRA: Was on spironolactone last year, consider reintroducing  - SGLT2: N/A  HFpEF - Recommend diuretics, BP control, HR control, MRA, & SGLT2  HFrEF - Recommend diuretics, BB, ARNI preferred/ACE-I/ARB, MRA, & SGLT2  HF consult to be called in AM

## 2025-02-08 LAB
ALBUMIN SERPL ELPH-MCNC: 3.6 G/DL — SIGNIFICANT CHANGE UP (ref 3.3–5)
ALP SERPL-CCNC: 252 U/L — HIGH (ref 40–120)
ALT FLD-CCNC: 12 U/L — SIGNIFICANT CHANGE UP (ref 10–45)
ANION GAP SERPL CALC-SCNC: 12 MMOL/L — SIGNIFICANT CHANGE UP (ref 5–17)
AST SERPL-CCNC: 25 U/L — SIGNIFICANT CHANGE UP (ref 10–40)
BILIRUB SERPL-MCNC: 0.8 MG/DL — SIGNIFICANT CHANGE UP (ref 0.2–1.2)
BUN SERPL-MCNC: 64 MG/DL — HIGH (ref 7–23)
CALCIUM SERPL-MCNC: 9.5 MG/DL — SIGNIFICANT CHANGE UP (ref 8.4–10.5)
CHLORIDE SERPL-SCNC: 94 MMOL/L — LOW (ref 96–108)
CO2 SERPL-SCNC: 38 MMOL/L — HIGH (ref 22–31)
CREAT SERPL-MCNC: 2.31 MG/DL — HIGH (ref 0.5–1.3)
EGFR: 28 ML/MIN/1.73M2 — LOW
EGFR: 28 ML/MIN/1.73M2 — LOW
GAS PNL BLDV: SIGNIFICANT CHANGE UP
GLUCOSE BLDC GLUCOMTR-MCNC: 114 MG/DL — HIGH (ref 70–99)
GLUCOSE BLDC GLUCOMTR-MCNC: 164 MG/DL — HIGH (ref 70–99)
GLUCOSE BLDC GLUCOMTR-MCNC: 185 MG/DL — HIGH (ref 70–99)
GLUCOSE BLDC GLUCOMTR-MCNC: 226 MG/DL — HIGH (ref 70–99)
GLUCOSE SERPL-MCNC: 112 MG/DL — HIGH (ref 70–99)
HCT VFR BLD CALC: 35 % — LOW (ref 39–50)
HGB BLD-MCNC: 9.7 G/DL — LOW (ref 13–17)
LACTATE SERPL-SCNC: 1.1 MMOL/L — SIGNIFICANT CHANGE UP (ref 0.5–2)
MAGNESIUM SERPL-MCNC: 2.2 MG/DL — SIGNIFICANT CHANGE UP (ref 1.6–2.6)
MCHC RBC-ENTMCNC: 25.7 PG — LOW (ref 27–34)
MCHC RBC-ENTMCNC: 27.7 G/DL — LOW (ref 32–36)
MCV RBC AUTO: 92.8 FL — SIGNIFICANT CHANGE UP (ref 80–100)
NRBC # BLD: 0 /100 WBCS — SIGNIFICANT CHANGE UP (ref 0–0)
NRBC BLD-RTO: 0 /100 WBCS — SIGNIFICANT CHANGE UP (ref 0–0)
PLATELET # BLD AUTO: 220 K/UL — SIGNIFICANT CHANGE UP (ref 150–400)
POTASSIUM SERPL-MCNC: 3.5 MMOL/L — SIGNIFICANT CHANGE UP (ref 3.5–5.3)
POTASSIUM SERPL-SCNC: 3.5 MMOL/L — SIGNIFICANT CHANGE UP (ref 3.5–5.3)
PROT SERPL-MCNC: 7.4 G/DL — SIGNIFICANT CHANGE UP (ref 6–8.3)
RBC # BLD: 3.77 M/UL — LOW (ref 4.2–5.8)
RBC # FLD: 24.8 % — HIGH (ref 10.3–14.5)
SODIUM SERPL-SCNC: 144 MMOL/L — SIGNIFICANT CHANGE UP (ref 135–145)
WBC # BLD: 10.7 K/UL — HIGH (ref 3.8–10.5)
WBC # FLD AUTO: 10.7 K/UL — HIGH (ref 3.8–10.5)

## 2025-02-08 PROCEDURE — 99233 SBSQ HOSP IP/OBS HIGH 50: CPT

## 2025-02-08 RX ADMIN — Medication 40 MILLIEQUIVALENT(S): at 05:39

## 2025-02-08 RX ADMIN — ATORVASTATIN CALCIUM 20 MILLIGRAM(S): 80 TABLET, FILM COATED ORAL at 21:47

## 2025-02-08 RX ADMIN — Medication 1 DOSE(S): at 05:36

## 2025-02-08 RX ADMIN — ACETAZOLAMIDE 250 MILLIGRAM(S): 250 TABLET ORAL at 05:37

## 2025-02-08 RX ADMIN — Medication 1 DOSE(S): at 18:06

## 2025-02-08 RX ADMIN — BUMETANIDE 5 MG/HR: 1 TABLET ORAL at 21:47

## 2025-02-08 RX ADMIN — ISOSORBDIE DINITRATE 10 MILLIGRAM(S): 30 TABLET ORAL at 11:56

## 2025-02-08 RX ADMIN — INSULIN GLARGINE-YFGN 32 UNIT(S): 100 INJECTION, SOLUTION SUBCUTANEOUS at 21:46

## 2025-02-08 RX ADMIN — INSULIN LISPRO 4: 100 INJECTION, SOLUTION INTRAVENOUS; SUBCUTANEOUS at 18:02

## 2025-02-08 RX ADMIN — Medication 40 MILLIEQUIVALENT(S): at 13:53

## 2025-02-08 RX ADMIN — TAMSULOSIN HYDROCHLORIDE 0.4 MILLIGRAM(S): 0.4 CAPSULE ORAL at 21:47

## 2025-02-08 RX ADMIN — INSULIN LISPRO 2: 100 INJECTION, SOLUTION INTRAVENOUS; SUBCUTANEOUS at 12:57

## 2025-02-08 RX ADMIN — POLYETHYLENE GLYCOL 3350 17 GRAM(S): 17 POWDER, FOR SOLUTION ORAL at 05:39

## 2025-02-08 RX ADMIN — TIOTROPIUM BROMIDE INHALATION SPRAY 2 PUFF(S): 3.12 SPRAY, METERED RESPIRATORY (INHALATION) at 11:57

## 2025-02-08 RX ADMIN — Medication 40 MILLIEQUIVALENT(S): at 21:47

## 2025-02-08 RX ADMIN — Medication 10 MILLIGRAM(S): at 05:36

## 2025-02-08 RX ADMIN — INSULIN LISPRO 3 UNIT(S): 100 INJECTION, SOLUTION INTRAVENOUS; SUBCUTANEOUS at 18:03

## 2025-02-08 RX ADMIN — INSULIN LISPRO 3 UNIT(S): 100 INJECTION, SOLUTION INTRAVENOUS; SUBCUTANEOUS at 12:57

## 2025-02-08 RX ADMIN — RIVAROXABAN 15 MILLIGRAM(S): 10 TABLET, FILM COATED ORAL at 18:06

## 2025-02-08 RX ADMIN — METOPROLOL SUCCINATE 50 MILLIGRAM(S): 50 TABLET, EXTENDED RELEASE ORAL at 05:36

## 2025-02-08 RX ADMIN — Medication 10 MILLIGRAM(S): at 21:47

## 2025-02-08 RX ADMIN — Medication 81 MILLIGRAM(S): at 11:56

## 2025-02-08 RX ADMIN — ISOSORBDIE DINITRATE 10 MILLIGRAM(S): 30 TABLET ORAL at 05:36

## 2025-02-08 RX ADMIN — ACETAZOLAMIDE 250 MILLIGRAM(S): 250 TABLET ORAL at 18:06

## 2025-02-08 RX ADMIN — INSULIN LISPRO 3 UNIT(S): 100 INJECTION, SOLUTION INTRAVENOUS; SUBCUTANEOUS at 09:22

## 2025-02-08 NOTE — PROGRESS NOTE ADULT - PROBLEM SELECTOR PLAN 1
CHOOSE ONE: HFpEF + HFrEF  EF 30% with severe tricuspid regurg and PASP 55mmHG on 2/7/24  Heart failure, CHF order set initiated    Guideline directed medical therapy as below: (Name/Dose/Frequency)  - Diuretic:  bumex gtt 1mg/hr, Diamox 250mg BID (PO KCl 40mEq BID for hypokalemia)  - BB:  Metoprolol succinate 50mg daily  - ARNI/ACE-I/ARB: Ramipril 2.5mg daily - hold given DENISHA on CKD  - Hydralazine/Nitrate: Add hydral 10mg TID. Isodril 10mg TID   - MRA: Was on spironolactone last year, will reintroduce pending denisha  - SGLT2: none

## 2025-02-08 NOTE — PROGRESS NOTE ADULT - SUBJECTIVE AND OBJECTIVE BOX
Contreras Brooks MD  Division of Hospital Medicine  Available on MS teams until 7pm  If no response or off-hours, page [phone]  -------------------------------------    Patient is a 79y old  Male who presents with a chief complaint of HF eval (07 Feb 2025 21:58)    SUBJECTIVE / OVERNIGHT EVENTS: none acute  ADDITIONAL REVIEW OF SYSTEMS: pt notes nasal cannula is dry and irritative to nose. Otherwise no new complaints, feels ok.     MEDICATIONS  (STANDING):  acetaZOLAMIDE Injectable 250 milliGRAM(s) IV Push every 12 hours  aspirin enteric coated 81 milliGRAM(s) Oral daily  atorvastatin 20 milliGRAM(s) Oral at bedtime  buMETAnide Infusion 1 mG/Hr (5 mL/Hr) IV Continuous <Continuous>  dextrose 5%. 1000 milliLiter(s) (50 mL/Hr) IV Continuous <Continuous>  dextrose 5%. 1000 milliLiter(s) (100 mL/Hr) IV Continuous <Continuous>  dextrose 50% Injectable 25 Gram(s) IV Push once  dextrose 50% Injectable 12.5 Gram(s) IV Push once  dextrose 50% Injectable 25 Gram(s) IV Push once  fluticasone propionate/ salmeterol 250-50 MICROgram(s) Diskus 1 Dose(s) Inhalation two times a day  glucagon  Injectable 1 milliGRAM(s) IntraMuscular once  hydrALAZINE 10 milliGRAM(s) Oral three times a day  insulin glargine Injectable (LANTUS) 32 Unit(s) SubCutaneous at bedtime  insulin lispro (ADMELOG) corrective regimen sliding scale   SubCutaneous three times a day before meals  insulin lispro Injectable (ADMELOG) 3 Unit(s) SubCutaneous three times a day before meals  isosorbide   dinitrate Tablet (ISORDIL) 10 milliGRAM(s) Oral three times a day  metoprolol succinate ER 50 milliGRAM(s) Oral daily  polyethylene glycol 3350 17 Gram(s) Oral two times a day  potassium chloride    Tablet ER 40 milliEquivalent(s) Oral three times a day  rivaroxaban 15 milliGRAM(s) Oral with dinner  tamsulosin 0.4 milliGRAM(s) Oral at bedtime  tiotropium 2.5 MICROgram(s) Inhaler 2 Puff(s) Inhalation daily    MEDICATIONS  (PRN):  acetaminophen     Tablet .. 650 milliGRAM(s) Oral every 6 hours PRN Temp greater or equal to 38C (100.4F), Mild Pain (1 - 3)  albuterol/ipratropium for Nebulization 3 milliLiter(s) Nebulizer every 6 hours PRN Shortness of Breath and/or Wheezing  dextrose Oral Gel 15 Gram(s) Oral once PRN Blood Glucose LESS THAN 70 milliGRAM(s)/deciliter  melatonin 3 milliGRAM(s) Oral at bedtime PRN Insomnia      CAPILLARY BLOOD GLUCOSE      POCT Blood Glucose.: 164 mg/dL (08 Feb 2025 12:02)  POCT Blood Glucose.: 114 mg/dL (08 Feb 2025 08:29)  POCT Blood Glucose.: 193 mg/dL (07 Feb 2025 21:06)  POCT Blood Glucose.: 116 mg/dL (07 Feb 2025 17:29)    I&O's Summary    07 Feb 2025 07:01  -  08 Feb 2025 07:00  --------------------------------------------------------  IN: 455 mL / OUT: 2400 mL / NET: -1945 mL    08 Feb 2025 07:01  -  08 Feb 2025 15:41  --------------------------------------------------------  IN: 35 mL / OUT: 1000 mL / NET: -965 mL        PHYSICAL EXAM:  Vital Signs Last 24 Hrs  T(C): 36.5 (08 Feb 2025 14:00), Max: 36.5 (08 Feb 2025 14:00)  T(F): 97.7 (08 Feb 2025 14:00), Max: 97.7 (08 Feb 2025 14:00)  HR: 70 (08 Feb 2025 12:15) (65 - 71)  BP: 113/60 (08 Feb 2025 14:00) (111/73 - 130/74)  BP(mean): --  RR: 18 (08 Feb 2025 14:00) (18 - 18)  SpO2: 95% (08 Feb 2025 14:00) (95% - 98%)    Parameters below as of 08 Feb 2025 14:00  Patient On (Oxygen Delivery Method): nasal cannula  O2 Flow (L/min): 3    CONSTITUTIONAL: NAD  EYES: PERRLA; conjunctiva and sclera clear  ENMT: MMM  NECK: Supple  RESPIRATORY: Normal respiratory effort; CTAB  CARDIOVASCULAR: RRR, no JVD, +2 pitting edema, per wife is improved  ABDOMEN: Nontender to palpation, normoactive BS, no guarding/rigidity  MUSCLOSKELETAL:  no clubbing/cyanosis, no joint swelling or tenderness to palpation  PSYCH: A+O x 3, affect normal  NEUROLOGY: CN 2-12 are intact and symmetric; no gross sensory or motor deficits  SKIN: No rashes; no palpable lesions    LABS:                        9.7    10.70 )-----------( 220      ( 08 Feb 2025 07:03 )             35.0     02-08    144  |  94[L]  |  64[H]  ----------------------------<  112[H]  3.5   |  38[H]  |  2.31[H]    Ca    9.5      08 Feb 2025 07:02  Phos  4.5     02-07  Mg     2.2     02-08    TPro  7.4  /  Alb  3.6  /  TBili  0.8  /  DBili  x   /  AST  25  /  ALT  12  /  AlkPhos  252[H]  02-08          Urinalysis Basic - ( 08 Feb 2025 07:02 )    Color: x / Appearance: x / SG: x / pH: x  Gluc: 112 mg/dL / Ketone: x  / Bili: x / Urobili: x   Blood: x / Protein: x / Nitrite: x   Leuk Esterase: x / RBC: x / WBC x   Sq Epi: x / Non Sq Epi: x / Bacteria: x          RADIOLOGY & ADDITIONAL TESTS:  Results Reviewed:   Imaging Personally Reviewed:  Electrocardiogram Personally Reviewed:    COORDINATION OF CARE:  Care Discussed with Consultants/Other Providers [Y/N]:  Prior or Outpatient Records Reviewed [Y/N]:

## 2025-02-08 NOTE — PROGRESS NOTE ADULT - ASSESSMENT
80 y/o male w/ PMHx CAD s/p CABG, HF (combined HFrEF [EF 25%] and HFpEF [G3DD]) w/ ICD, AFib on Xarelto, severe AS s/p TAVR, COPD on 3-4L home O2, CKD4, HTN, HLD, T2DM, and BPH who presents as a transfer from [facility] for HF evaluation. Ongoing CHF exacerbation on bumex gtt.

## 2025-02-09 DIAGNOSIS — I07.1 RHEUMATIC TRICUSPID INSUFFICIENCY: ICD-10-CM

## 2025-02-09 DIAGNOSIS — Z79.4 LONG TERM (CURRENT) USE OF INSULIN: ICD-10-CM

## 2025-02-09 DIAGNOSIS — E78.5 HYPERLIPIDEMIA, UNSPECIFIED: ICD-10-CM

## 2025-02-09 DIAGNOSIS — E66.9 OBESITY, UNSPECIFIED: ICD-10-CM

## 2025-02-09 DIAGNOSIS — Z79.82 LONG TERM (CURRENT) USE OF ASPIRIN: ICD-10-CM

## 2025-02-09 DIAGNOSIS — I11.0 HYPERTENSIVE HEART DISEASE WITH HEART FAILURE: ICD-10-CM

## 2025-02-09 DIAGNOSIS — Z45.02 ENCOUNTER FOR ADJUSTMENT AND MANAGEMENT OF AUTOMATIC IMPLANTABLE CARDIAC DEFIBRILLATOR: ICD-10-CM

## 2025-02-09 DIAGNOSIS — I27.20 PULMONARY HYPERTENSION, UNSPECIFIED: ICD-10-CM

## 2025-02-09 DIAGNOSIS — I25.10 ATHEROSCLEROTIC HEART DISEASE OF NATIVE CORONARY ARTERY WITHOUT ANGINA PECTORIS: ICD-10-CM

## 2025-02-09 DIAGNOSIS — J44.9 CHRONIC OBSTRUCTIVE PULMONARY DISEASE, UNSPECIFIED: ICD-10-CM

## 2025-02-09 DIAGNOSIS — I48.0 PAROXYSMAL ATRIAL FIBRILLATION: ICD-10-CM

## 2025-02-09 DIAGNOSIS — I42.9 CARDIOMYOPATHY, UNSPECIFIED: ICD-10-CM

## 2025-02-09 DIAGNOSIS — Z95.1 PRESENCE OF AORTOCORONARY BYPASS GRAFT: ICD-10-CM

## 2025-02-09 DIAGNOSIS — N18.30 CHRONIC KIDNEY DISEASE, STAGE 3 UNSPECIFIED: ICD-10-CM

## 2025-02-09 DIAGNOSIS — J96.11 CHRONIC RESPIRATORY FAILURE WITH HYPOXIA: ICD-10-CM

## 2025-02-09 DIAGNOSIS — D62 ACUTE POSTHEMORRHAGIC ANEMIA: ICD-10-CM

## 2025-02-09 DIAGNOSIS — Z99.81 DEPENDENCE ON SUPPLEMENTAL OXYGEN: ICD-10-CM

## 2025-02-09 DIAGNOSIS — Z98.890 OTHER SPECIFIED POSTPROCEDURAL STATES: ICD-10-CM

## 2025-02-09 DIAGNOSIS — Z91.148 PATIENT'S OTHER NONCOMPLIANCE WITH MEDICATION REGIMEN FOR OTHER REASON: ICD-10-CM

## 2025-02-09 DIAGNOSIS — N17.9 ACUTE KIDNEY FAILURE, UNSPECIFIED: ICD-10-CM

## 2025-02-09 DIAGNOSIS — E87.6 HYPOKALEMIA: ICD-10-CM

## 2025-02-09 LAB
ANION GAP SERPL CALC-SCNC: 13 MMOL/L — SIGNIFICANT CHANGE UP (ref 5–17)
BUN SERPL-MCNC: 60 MG/DL — HIGH (ref 7–23)
CALCIUM SERPL-MCNC: 9.7 MG/DL — SIGNIFICANT CHANGE UP (ref 8.4–10.5)
CHLORIDE SERPL-SCNC: 97 MMOL/L — SIGNIFICANT CHANGE UP (ref 96–108)
CO2 SERPL-SCNC: 34 MMOL/L — HIGH (ref 22–31)
CREAT SERPL-MCNC: 2.31 MG/DL — HIGH (ref 0.5–1.3)
EGFR: 28 ML/MIN/1.73M2 — LOW
EGFR: 28 ML/MIN/1.73M2 — LOW
FLUAV AG NPH QL: SIGNIFICANT CHANGE UP
FLUBV AG NPH QL: SIGNIFICANT CHANGE UP
GLUCOSE BLDC GLUCOMTR-MCNC: 173 MG/DL — HIGH (ref 70–99)
GLUCOSE BLDC GLUCOMTR-MCNC: 184 MG/DL — HIGH (ref 70–99)
GLUCOSE BLDC GLUCOMTR-MCNC: 289 MG/DL — HIGH (ref 70–99)
GLUCOSE BLDC GLUCOMTR-MCNC: 88 MG/DL — SIGNIFICANT CHANGE UP (ref 70–99)
GLUCOSE SERPL-MCNC: 116 MG/DL — HIGH (ref 70–99)
HCT VFR BLD CALC: 35.2 % — LOW (ref 39–50)
HGB BLD-MCNC: 9.5 G/DL — LOW (ref 13–17)
MAGNESIUM SERPL-MCNC: 2.1 MG/DL — SIGNIFICANT CHANGE UP (ref 1.6–2.6)
MCHC RBC-ENTMCNC: 25.3 PG — LOW (ref 27–34)
MCHC RBC-ENTMCNC: 27 G/DL — LOW (ref 32–36)
MCV RBC AUTO: 93.6 FL — SIGNIFICANT CHANGE UP (ref 80–100)
NRBC # BLD: 0 /100 WBCS — SIGNIFICANT CHANGE UP (ref 0–0)
NRBC BLD-RTO: 0 /100 WBCS — SIGNIFICANT CHANGE UP (ref 0–0)
PLATELET # BLD AUTO: 228 K/UL — SIGNIFICANT CHANGE UP (ref 150–400)
POTASSIUM SERPL-MCNC: 4.2 MMOL/L — SIGNIFICANT CHANGE UP (ref 3.5–5.3)
POTASSIUM SERPL-SCNC: 4.2 MMOL/L — SIGNIFICANT CHANGE UP (ref 3.5–5.3)
RBC # BLD: 3.76 M/UL — LOW (ref 4.2–5.8)
RBC # FLD: 24.4 % — HIGH (ref 10.3–14.5)
RSV RNA NPH QL NAA+NON-PROBE: SIGNIFICANT CHANGE UP
SARS-COV-2 RNA SPEC QL NAA+PROBE: SIGNIFICANT CHANGE UP
SODIUM SERPL-SCNC: 144 MMOL/L — SIGNIFICANT CHANGE UP (ref 135–145)
WBC # BLD: 11.3 K/UL — HIGH (ref 3.8–10.5)
WBC # FLD AUTO: 11.3 K/UL — HIGH (ref 3.8–10.5)

## 2025-02-09 PROCEDURE — 99232 SBSQ HOSP IP/OBS MODERATE 35: CPT

## 2025-02-09 RX ORDER — SODIUM CHLORIDE 0.65 %
1 AEROSOL, SPRAY (ML) NASAL
Refills: 0 | Status: DISCONTINUED | OUTPATIENT
Start: 2025-02-09 | End: 2025-04-14

## 2025-02-09 RX ADMIN — INSULIN LISPRO 3 UNIT(S): 100 INJECTION, SOLUTION INTRAVENOUS; SUBCUTANEOUS at 09:02

## 2025-02-09 RX ADMIN — INSULIN LISPRO 3 UNIT(S): 100 INJECTION, SOLUTION INTRAVENOUS; SUBCUTANEOUS at 17:11

## 2025-02-09 RX ADMIN — ISOSORBDIE DINITRATE 10 MILLIGRAM(S): 30 TABLET ORAL at 05:17

## 2025-02-09 RX ADMIN — POLYETHYLENE GLYCOL 3350 17 GRAM(S): 17 POWDER, FOR SOLUTION ORAL at 05:16

## 2025-02-09 RX ADMIN — Medication 1 DOSE(S): at 17:09

## 2025-02-09 RX ADMIN — RIVAROXABAN 15 MILLIGRAM(S): 10 TABLET, FILM COATED ORAL at 17:10

## 2025-02-09 RX ADMIN — Medication 10 MILLIGRAM(S): at 13:05

## 2025-02-09 RX ADMIN — ISOSORBDIE DINITRATE 10 MILLIGRAM(S): 30 TABLET ORAL at 17:10

## 2025-02-09 RX ADMIN — ATORVASTATIN CALCIUM 20 MILLIGRAM(S): 80 TABLET, FILM COATED ORAL at 21:22

## 2025-02-09 RX ADMIN — INSULIN LISPRO 2: 100 INJECTION, SOLUTION INTRAVENOUS; SUBCUTANEOUS at 13:04

## 2025-02-09 RX ADMIN — Medication 10 MILLIGRAM(S): at 05:18

## 2025-02-09 RX ADMIN — POLYETHYLENE GLYCOL 3350 17 GRAM(S): 17 POWDER, FOR SOLUTION ORAL at 17:10

## 2025-02-09 RX ADMIN — INSULIN LISPRO 3 UNIT(S): 100 INJECTION, SOLUTION INTRAVENOUS; SUBCUTANEOUS at 13:04

## 2025-02-09 RX ADMIN — Medication 40 MILLIEQUIVALENT(S): at 21:22

## 2025-02-09 RX ADMIN — TIOTROPIUM BROMIDE INHALATION SPRAY 2 PUFF(S): 3.12 SPRAY, METERED RESPIRATORY (INHALATION) at 12:57

## 2025-02-09 RX ADMIN — ACETAZOLAMIDE 250 MILLIGRAM(S): 250 TABLET ORAL at 05:19

## 2025-02-09 RX ADMIN — Medication 1 SPRAY(S): at 17:00

## 2025-02-09 RX ADMIN — TAMSULOSIN HYDROCHLORIDE 0.4 MILLIGRAM(S): 0.4 CAPSULE ORAL at 21:22

## 2025-02-09 RX ADMIN — Medication 81 MILLIGRAM(S): at 12:57

## 2025-02-09 RX ADMIN — BUMETANIDE 5 MG/HR: 1 TABLET ORAL at 07:00

## 2025-02-09 RX ADMIN — IPRATROPIUM BROMIDE AND ALBUTEROL SULFATE 3 MILLILITER(S): .5; 2.5 SOLUTION RESPIRATORY (INHALATION) at 03:26

## 2025-02-09 RX ADMIN — Medication 1 SPRAY(S): at 05:16

## 2025-02-09 RX ADMIN — ISOSORBDIE DINITRATE 10 MILLIGRAM(S): 30 TABLET ORAL at 12:57

## 2025-02-09 RX ADMIN — INSULIN GLARGINE-YFGN 32 UNIT(S): 100 INJECTION, SOLUTION SUBCUTANEOUS at 21:22

## 2025-02-09 RX ADMIN — Medication 1 DOSE(S): at 05:17

## 2025-02-09 RX ADMIN — Medication 10 MILLIGRAM(S): at 21:22

## 2025-02-09 RX ADMIN — ACETAZOLAMIDE 250 MILLIGRAM(S): 250 TABLET ORAL at 17:10

## 2025-02-09 RX ADMIN — Medication 40 MILLIEQUIVALENT(S): at 13:07

## 2025-02-09 RX ADMIN — INSULIN LISPRO 2: 100 INJECTION, SOLUTION INTRAVENOUS; SUBCUTANEOUS at 17:11

## 2025-02-09 RX ADMIN — METOPROLOL SUCCINATE 50 MILLIGRAM(S): 50 TABLET, EXTENDED RELEASE ORAL at 05:17

## 2025-02-09 RX ADMIN — Medication 40 MILLIEQUIVALENT(S): at 05:17

## 2025-02-09 NOTE — PROGRESS NOTE ADULT - SUBJECTIVE AND OBJECTIVE BOX
Contreras Brooks MD  Division of Hospital Medicine  Available on MS teams until 7pm  If no response or off-hours, page [phone]  -------------------------------------    Patient is a 79y old  Male who presents with a chief complaint of HF eval (08 Feb 2025 15:41)    SUBJECTIVE / OVERNIGHT EVENTS: mild nosebleed overnight  ADDITIONAL REVIEW OF SYSTEMS: none acute, feels better    MEDICATIONS  (STANDING):  acetaZOLAMIDE Injectable 250 milliGRAM(s) IV Push every 12 hours  aspirin enteric coated 81 milliGRAM(s) Oral daily  atorvastatin 20 milliGRAM(s) Oral at bedtime  buMETAnide Infusion 1 mG/Hr (5 mL/Hr) IV Continuous <Continuous>  dextrose 5%. 1000 milliLiter(s) (100 mL/Hr) IV Continuous <Continuous>  dextrose 5%. 1000 milliLiter(s) (50 mL/Hr) IV Continuous <Continuous>  dextrose 50% Injectable 25 Gram(s) IV Push once  dextrose 50% Injectable 12.5 Gram(s) IV Push once  dextrose 50% Injectable 25 Gram(s) IV Push once  fluticasone propionate/ salmeterol 250-50 MICROgram(s) Diskus 1 Dose(s) Inhalation two times a day  glucagon  Injectable 1 milliGRAM(s) IntraMuscular once  hydrALAZINE 10 milliGRAM(s) Oral three times a day  insulin glargine Injectable (LANTUS) 32 Unit(s) SubCutaneous at bedtime  insulin lispro (ADMELOG) corrective regimen sliding scale   SubCutaneous three times a day before meals  insulin lispro Injectable (ADMELOG) 3 Unit(s) SubCutaneous three times a day before meals  isosorbide   dinitrate Tablet (ISORDIL) 10 milliGRAM(s) Oral three times a day  metoprolol succinate ER 50 milliGRAM(s) Oral daily  polyethylene glycol 3350 17 Gram(s) Oral two times a day  potassium chloride    Tablet ER 40 milliEquivalent(s) Oral three times a day  rivaroxaban 15 milliGRAM(s) Oral with dinner  sodium chloride 0.65% Nasal 1 Spray(s) Both Nostrils two times a day  tamsulosin 0.4 milliGRAM(s) Oral at bedtime  tiotropium 2.5 MICROgram(s) Inhaler 2 Puff(s) Inhalation daily    MEDICATIONS  (PRN):  acetaminophen     Tablet .. 650 milliGRAM(s) Oral every 6 hours PRN Temp greater or equal to 38C (100.4F), Mild Pain (1 - 3)  albuterol/ipratropium for Nebulization 3 milliLiter(s) Nebulizer every 6 hours PRN Shortness of Breath and/or Wheezing  dextrose Oral Gel 15 Gram(s) Oral once PRN Blood Glucose LESS THAN 70 milliGRAM(s)/deciliter  melatonin 3 milliGRAM(s) Oral at bedtime PRN Insomnia      CAPILLARY BLOOD GLUCOSE      POCT Blood Glucose.: 173 mg/dL (09 Feb 2025 12:51)  POCT Blood Glucose.: 88 mg/dL (09 Feb 2025 08:38)  POCT Blood Glucose.: 185 mg/dL (08 Feb 2025 21:20)  POCT Blood Glucose.: 226 mg/dL (08 Feb 2025 17:05)    I&O's Summary    08 Feb 2025 07:01  -  09 Feb 2025 07:00  --------------------------------------------------------  IN: 410 mL / OUT: 2950 mL / NET: -2540 mL    09 Feb 2025 07:01  -  09 Feb 2025 16:14  --------------------------------------------------------  IN: 540 mL / OUT: 650 mL / NET: -110 mL        PHYSICAL EXAM:  Vital Signs Last 24 Hrs  T(C): 36.6 (09 Feb 2025 12:30), Max: 36.7 (09 Feb 2025 02:36)  T(F): 97.9 (09 Feb 2025 12:30), Max: 98 (09 Feb 2025 02:36)  HR: 70 (09 Feb 2025 15:16) (69 - 77)  BP: 121/71 (09 Feb 2025 12:54) (108/61 - 148/77)  BP(mean): --  RR: 18 (09 Feb 2025 15:16) (18 - 18)  SpO2: 98% (09 Feb 2025 15:16) (94% - 100%)    Parameters below as of 09 Feb 2025 15:16  Patient On (Oxygen Delivery Method): nasal cannula  O2 Flow (L/min): 3    CONSTITUTIONAL: NAD  EYES: PERRLA; conjunctiva and sclera clear  ENMT: MMM  NECK: Supple  RESPIRATORY: Normal respiratory effort; CTAB  CARDIOVASCULAR: RRR, no JVD, +2 pitting edema, improving   ABDOMEN: Nontender to palpation, normoactive BS, no guarding/rigidity  MUSCLOSKELETAL:  no clubbing/cyanosis, no joint swelling or tenderness to palpation  PSYCH: A+O x 3, affect normal  NEUROLOGY: CN 2-12 are intact and symmetric; no gross sensory or motor deficits  SKIN: No rashes; no palpable lesions    LABS:                        9.5    11.30 )-----------( 228      ( 09 Feb 2025 03:19 )             35.2     02-09    144  |  97  |  60[H]  ----------------------------<  116[H]  4.2   |  34[H]  |  2.31[H]    Ca    9.7      09 Feb 2025 03:19  Mg     2.1     02-09    TPro  7.4  /  Alb  3.6  /  TBili  0.8  /  DBili  x   /  AST  25  /  ALT  12  /  AlkPhos  252[H]  02-08          Urinalysis Basic - ( 09 Feb 2025 03:19 )    Color: x / Appearance: x / SG: x / pH: x  Gluc: 116 mg/dL / Ketone: x  / Bili: x / Urobili: x   Blood: x / Protein: x / Nitrite: x   Leuk Esterase: x / RBC: x / WBC x   Sq Epi: x / Non Sq Epi: x / Bacteria: x          RADIOLOGY & ADDITIONAL TESTS:  Results Reviewed:   Imaging Personally Reviewed:  Electrocardiogram Personally Reviewed:    COORDINATION OF CARE:  Care Discussed with Consultants/Other Providers [Y/N]:  Prior or Outpatient Records Reviewed [Y/N]:

## 2025-02-09 NOTE — CHART NOTE - NSCHARTNOTEFT_GEN_A_CORE
Medicine PA Event Note    Notified by RN at 02:25 am -pt with nose bleed and cough after placed on CPAP. CPAP taken off and pt asx and VSS.   Pt seen and examined at bedside, noted resting comfortably in NAD. Pt is AOx4 and states that he started coughing Medicine PA Event Note    Notified by RN at 02:25 am -pt with nose bleed and cough after placed on CPAP. CPAP taken off, pt asx and VSS.   Pt seen and examined at bedside, noted resting comfortably in NAD. Pt is AOx4 and states he started having cough with productive clear/redish sputum with mucus and epistaxis. He denies any HA, blurry vision, CP, palpitations, SOB, difficulty breathing, Abd pain, N/V and dysuria.   PE significant for dry blood in R nasal nare and dry, congested Left nare without any active bleeding at this time. Per wife at bedside, pt was recently exposed to Flu by roommate.     Vital Signs Last 24 Hrs  T(C): 36.7 (09 Feb 2025 02:36), Max: 36.7 (08 Feb 2025 16:00)  T(F): 98 (09 Feb 2025 02:36), Max: 98 (08 Feb 2025 16:00)  HR: 70 (09 Feb 2025 02:36) (69 - 73)  BP: 121/62 (09 Feb 2025 02:36) (108/61 - 148/77)  RR: 18 (09 Feb 2025 02:36) (18 - 18)  SpO2: 96% (09 Feb 2025 02:36) (94% - 98%)  Parameters below as of 09 Feb 2025 02:36  Patient On (Oxygen Delivery Method): nasal cannula, 3l      Plan: Epistaxis while on CPAP  - STAT CBC ordered  - duoneb x1   - ocean spray ordered for B/L nostrils   - Full RVP ordered for recent Flu exposure.  - RN instructed to apply a small amount of Vaseline to the inside of B/L nostrils with a clean cotton swab.  - c/w monitoring the pt on tele   - will d/w primary in am and attending to follow Medicine PA Event Note    Notified by RN at 02:25 am -pt with nose bleed and cough after placed on CPAP. CPAP taken off, pt asx and VSS.   Pt seen and examined at bedside, noted resting comfortably in NAD. Pt is AOx4 and states he started having cough with productive clear/redish sputum with mucus and epistaxis. He denies any HA, blurry vision, CP, palpitations, SOB, difficulty breathing, Abd pain, N/V and dysuria.   PE significant for dry blood in R nasal nare and dry, congested Left nare without any active bleeding at this time. Per wife at bedside, pt was recently exposed to Flu by roommate.     Vital Signs Last 24 Hrs  T(C): 36.7 (09 Feb 2025 02:36), Max: 36.7 (08 Feb 2025 16:00)  T(F): 98 (09 Feb 2025 02:36), Max: 98 (08 Feb 2025 16:00)  HR: 70 (09 Feb 2025 02:36) (69 - 73)  BP: 121/62 (09 Feb 2025 02:36) (108/61 - 148/77)  RR: 18 (09 Feb 2025 02:36) (18 - 18)  SpO2: 96% (09 Feb 2025 02:36) (94% - 98%)  Parameters below as of 09 Feb 2025 02:36  Patient On (Oxygen Delivery Method): nasal cannula, 3l      Plan: Epistaxis while on CPAP  - STAT CBC ordered  - duoneb x1   - ocean spray ordered for B/L nostrils   - Full RVP ordered for recent Flu exposure.  - RN instructed to apply a small amount of Vaseline to the inside of B/L nostrils with a clean cotton swab.  - c/w monitoring the pt on tele   - will d/w primary in am and attending to follow        0400  -CBC stable  -Negative full RVP

## 2025-02-09 NOTE — PROGRESS NOTE ADULT - ASSESSMENT
80 y/o male w/ PMHx CAD s/p CABG, HF (combined HFrEF [EF 25%] and HFpEF [G3DD]) w/ ICD, AFib on Xarelto, severe AS s/p TAVR, COPD on 3-4L home O2, CKD4, HTN, HLD, T2DM, and BPH who presents [facility] a transfer from [facility] for HF evaluation. Ongoing CHF exacerbation on bumex gtt.

## 2025-02-10 LAB
ANION GAP SERPL CALC-SCNC: 14 MMOL/L — SIGNIFICANT CHANGE UP (ref 5–17)
ANION GAP SERPL CALC-SCNC: 19 MMOL/L — HIGH (ref 5–17)
BUN SERPL-MCNC: 59 MG/DL — HIGH (ref 7–23)
BUN SERPL-MCNC: 62 MG/DL — HIGH (ref 7–23)
CALCIUM SERPL-MCNC: 9.7 MG/DL — SIGNIFICANT CHANGE UP (ref 8.4–10.5)
CALCIUM SERPL-MCNC: 9.8 MG/DL — SIGNIFICANT CHANGE UP (ref 8.4–10.5)
CHLORIDE SERPL-SCNC: 93 MMOL/L — LOW (ref 96–108)
CHLORIDE SERPL-SCNC: 97 MMOL/L — SIGNIFICANT CHANGE UP (ref 96–108)
CO2 SERPL-SCNC: 26 MMOL/L — SIGNIFICANT CHANGE UP (ref 22–31)
CO2 SERPL-SCNC: 34 MMOL/L — HIGH (ref 22–31)
CREAT SERPL-MCNC: 2.24 MG/DL — HIGH (ref 0.5–1.3)
CREAT SERPL-MCNC: 2.33 MG/DL — HIGH (ref 0.5–1.3)
EGFR: 27 ML/MIN/1.73M2 — LOW
EGFR: 27 ML/MIN/1.73M2 — LOW
EGFR: 29 ML/MIN/1.73M2 — LOW
EGFR: 29 ML/MIN/1.73M2 — LOW
GLUCOSE BLDC GLUCOMTR-MCNC: 126 MG/DL — HIGH (ref 70–99)
GLUCOSE BLDC GLUCOMTR-MCNC: 152 MG/DL — HIGH (ref 70–99)
GLUCOSE BLDC GLUCOMTR-MCNC: 198 MG/DL — HIGH (ref 70–99)
GLUCOSE BLDC GLUCOMTR-MCNC: 287 MG/DL — HIGH (ref 70–99)
GLUCOSE SERPL-MCNC: 161 MG/DL — HIGH (ref 70–99)
GLUCOSE SERPL-MCNC: 213 MG/DL — HIGH (ref 70–99)
MAGNESIUM SERPL-MCNC: 2.2 MG/DL — SIGNIFICANT CHANGE UP (ref 1.6–2.6)
MAGNESIUM SERPL-MCNC: 2.2 MG/DL — SIGNIFICANT CHANGE UP (ref 1.6–2.6)
PHOSPHATE SERPL-MCNC: 4.2 MG/DL — SIGNIFICANT CHANGE UP (ref 2.5–4.5)
POTASSIUM SERPL-MCNC: 4.4 MMOL/L — SIGNIFICANT CHANGE UP (ref 3.5–5.3)
POTASSIUM SERPL-MCNC: 4.7 MMOL/L — SIGNIFICANT CHANGE UP (ref 3.5–5.3)
POTASSIUM SERPL-SCNC: 4.4 MMOL/L — SIGNIFICANT CHANGE UP (ref 3.5–5.3)
POTASSIUM SERPL-SCNC: 4.7 MMOL/L — SIGNIFICANT CHANGE UP (ref 3.5–5.3)
SODIUM SERPL-SCNC: 141 MMOL/L — SIGNIFICANT CHANGE UP (ref 135–145)
SODIUM SERPL-SCNC: 142 MMOL/L — SIGNIFICANT CHANGE UP (ref 135–145)

## 2025-02-10 PROCEDURE — 99233 SBSQ HOSP IP/OBS HIGH 50: CPT

## 2025-02-10 PROCEDURE — 99232 SBSQ HOSP IP/OBS MODERATE 35: CPT

## 2025-02-10 RX ADMIN — Medication 10 MILLIGRAM(S): at 05:17

## 2025-02-10 RX ADMIN — METOPROLOL SUCCINATE 50 MILLIGRAM(S): 50 TABLET, EXTENDED RELEASE ORAL at 05:16

## 2025-02-10 RX ADMIN — Medication 1 DOSE(S): at 05:16

## 2025-02-10 RX ADMIN — ATORVASTATIN CALCIUM 20 MILLIGRAM(S): 80 TABLET, FILM COATED ORAL at 22:40

## 2025-02-10 RX ADMIN — Medication 1 SPRAY(S): at 05:16

## 2025-02-10 RX ADMIN — INSULIN LISPRO 6: 100 INJECTION, SOLUTION INTRAVENOUS; SUBCUTANEOUS at 17:57

## 2025-02-10 RX ADMIN — Medication 1 DOSE(S): at 17:58

## 2025-02-10 RX ADMIN — ACETAZOLAMIDE 250 MILLIGRAM(S): 250 TABLET ORAL at 05:17

## 2025-02-10 RX ADMIN — INSULIN LISPRO 3 UNIT(S): 100 INJECTION, SOLUTION INTRAVENOUS; SUBCUTANEOUS at 08:58

## 2025-02-10 RX ADMIN — Medication 81 MILLIGRAM(S): at 11:30

## 2025-02-10 RX ADMIN — ISOSORBDIE DINITRATE 10 MILLIGRAM(S): 30 TABLET ORAL at 05:17

## 2025-02-10 RX ADMIN — Medication 40 MILLIEQUIVALENT(S): at 12:50

## 2025-02-10 RX ADMIN — RIVAROXABAN 15 MILLIGRAM(S): 10 TABLET, FILM COATED ORAL at 17:59

## 2025-02-10 RX ADMIN — Medication 10 MILLIGRAM(S): at 12:50

## 2025-02-10 RX ADMIN — TAMSULOSIN HYDROCHLORIDE 0.4 MILLIGRAM(S): 0.4 CAPSULE ORAL at 22:40

## 2025-02-10 RX ADMIN — ISOSORBDIE DINITRATE 10 MILLIGRAM(S): 30 TABLET ORAL at 11:31

## 2025-02-10 RX ADMIN — INSULIN LISPRO 3 UNIT(S): 100 INJECTION, SOLUTION INTRAVENOUS; SUBCUTANEOUS at 12:49

## 2025-02-10 RX ADMIN — Medication 1 SPRAY(S): at 17:58

## 2025-02-10 RX ADMIN — Medication 40 MILLIEQUIVALENT(S): at 22:39

## 2025-02-10 RX ADMIN — Medication 40 MILLIEQUIVALENT(S): at 05:17

## 2025-02-10 RX ADMIN — TIOTROPIUM BROMIDE INHALATION SPRAY 2 PUFF(S): 3.12 SPRAY, METERED RESPIRATORY (INHALATION) at 11:30

## 2025-02-10 RX ADMIN — Medication 25 MILLIGRAM(S): at 22:40

## 2025-02-10 RX ADMIN — INSULIN LISPRO 3 UNIT(S): 100 INJECTION, SOLUTION INTRAVENOUS; SUBCUTANEOUS at 17:57

## 2025-02-10 RX ADMIN — ISOSORBDIE DINITRATE 10 MILLIGRAM(S): 30 TABLET ORAL at 18:20

## 2025-02-10 RX ADMIN — POLYETHYLENE GLYCOL 3350 17 GRAM(S): 17 POWDER, FOR SOLUTION ORAL at 05:17

## 2025-02-10 RX ADMIN — INSULIN GLARGINE-YFGN 32 UNIT(S): 100 INJECTION, SOLUTION SUBCUTANEOUS at 22:40

## 2025-02-10 RX ADMIN — INSULIN LISPRO 2: 100 INJECTION, SOLUTION INTRAVENOUS; SUBCUTANEOUS at 12:49

## 2025-02-10 NOTE — PROGRESS NOTE ADULT - SUBJECTIVE AND OBJECTIVE BOX
Contreras Brooks MD  Division of Hospital Medicine  Available on MS teams until 7pm  If no response or off-hours, page [phone]  -------------------------------------    Patient is a 79y old  Male who presents with a chief complaint of HF eval (09 Feb 2025 16:14)      SUBJECTIVE / OVERNIGHT EVENTS: none acute  ADDITIONAL REVIEW OF SYSTEMS: pt without any acute/new complaints. Wife notes pt is DNR/DNI, unclear whether ICD turned off.    MEDICATIONS  (STANDING):  aspirin enteric coated 81 milliGRAM(s) Oral daily  atorvastatin 20 milliGRAM(s) Oral at bedtime  buMETAnide Infusion 1 mG/Hr (5 mL/Hr) IV Continuous <Continuous>  dextrose 5%. 1000 milliLiter(s) (50 mL/Hr) IV Continuous <Continuous>  dextrose 5%. 1000 milliLiter(s) (100 mL/Hr) IV Continuous <Continuous>  dextrose 50% Injectable 25 Gram(s) IV Push once  dextrose 50% Injectable 12.5 Gram(s) IV Push once  dextrose 50% Injectable 25 Gram(s) IV Push once  fluticasone propionate/ salmeterol 250-50 MICROgram(s) Diskus 1 Dose(s) Inhalation two times a day  glucagon  Injectable 1 milliGRAM(s) IntraMuscular once  hydrALAZINE 25 milliGRAM(s) Oral three times a day  insulin glargine Injectable (LANTUS) 32 Unit(s) SubCutaneous at bedtime  insulin lispro (ADMELOG) corrective regimen sliding scale   SubCutaneous three times a day before meals  insulin lispro Injectable (ADMELOG) 3 Unit(s) SubCutaneous three times a day before meals  isosorbide   dinitrate Tablet (ISORDIL) 10 milliGRAM(s) Oral three times a day  metoprolol succinate ER 50 milliGRAM(s) Oral daily  polyethylene glycol 3350 17 Gram(s) Oral two times a day  potassium chloride    Tablet ER 40 milliEquivalent(s) Oral three times a day  rivaroxaban 15 milliGRAM(s) Oral with dinner  sodium chloride 0.65% Nasal 1 Spray(s) Both Nostrils two times a day  tamsulosin 0.4 milliGRAM(s) Oral at bedtime  tiotropium 2.5 MICROgram(s) Inhaler 2 Puff(s) Inhalation daily    MEDICATIONS  (PRN):  acetaminophen     Tablet .. 650 milliGRAM(s) Oral every 6 hours PRN Temp greater or equal to 38C (100.4F), Mild Pain (1 - 3)  albuterol/ipratropium for Nebulization 3 milliLiter(s) Nebulizer every 6 hours PRN Shortness of Breath and/or Wheezing  dextrose Oral Gel 15 Gram(s) Oral once PRN Blood Glucose LESS THAN 70 milliGRAM(s)/deciliter  melatonin 3 milliGRAM(s) Oral at bedtime PRN Insomnia      CAPILLARY BLOOD GLUCOSE      POCT Blood Glucose.: 152 mg/dL (10 Feb 2025 12:34)  POCT Blood Glucose.: 126 mg/dL (10 Feb 2025 08:44)  POCT Blood Glucose.: 289 mg/dL (09 Feb 2025 21:17)  POCT Blood Glucose.: 184 mg/dL (09 Feb 2025 17:03)    I&O's Summary    09 Feb 2025 07:01  -  10 Feb 2025 07:00  --------------------------------------------------------  IN: 1700 mL / OUT: 1900 mL / NET: -200 mL    10 Feb 2025 07:01  -  10 Feb 2025 15:09  --------------------------------------------------------  IN: 480 mL / OUT: 1500 mL / NET: -1020 mL        PHYSICAL EXAM:  Vital Signs Last 24 Hrs  T(C): 36.4 (10 Feb 2025 11:39), Max: 36.6 (09 Feb 2025 15:40)  T(F): 97.6 (10 Feb 2025 11:39), Max: 97.9 (09 Feb 2025 15:40)  HR: 67 (10 Feb 2025 14:24) (66 - 74)  BP: 103/61 (10 Feb 2025 11:39) (99/59 - 128/74)  BP(mean): --  RR: 18 (10 Feb 2025 14:24) (18 - 18)  SpO2: 95% (10 Feb 2025 14:24) (95% - 100%)    Parameters below as of 10 Feb 2025 14:24  Patient On (Oxygen Delivery Method): nasal cannula  O2 Flow (L/min): 3    CONSTITUTIONAL: NAD  EYES: PERRLA; conjunctiva and sclera clear  ENMT: MMM  NECK: Supple  RESPIRATORY: Normal respiratory effort; CTAB  CARDIOVASCULAR: RRR, no JVD, trace pitting edema  ABDOMEN: Nontender to palpation, normoactive BS, no guarding/rigidity  MUSCLOSKELETAL:  no clubbing/cyanosis, no joint swelling or tenderness to palpation  PSYCH: A+O x 3, affect normal  NEUROLOGY: CN 2-12 are intact and symmetric; no gross sensory or motor deficits  SKIN: No rashes; no palpable lesions    LABS:                        9.5    11.30 )-----------( 228      ( 09 Feb 2025 03:19 )             35.2     02-10    142  |  97  |  59[H]  ----------------------------<  161[H]  4.7   |  26  |  2.24[H]    Ca    9.8      10 Feb 2025 02:56  Phos  4.2     02-10  Mg     2.2     02-10            Urinalysis Basic - ( 10 Feb 2025 02:56 )    Color: x / Appearance: x / SG: x / pH: x  Gluc: 161 mg/dL / Ketone: x  / Bili: x / Urobili: x   Blood: x / Protein: x / Nitrite: x   Leuk Esterase: x / RBC: x / WBC x   Sq Epi: x / Non Sq Epi: x / Bacteria: x          RADIOLOGY & ADDITIONAL TESTS:  Results Reviewed:   Imaging Personally Reviewed:  Electrocardiogram Personally Reviewed:    COORDINATION OF CARE:  Care Discussed with Consultants/Other Providers [Y/N]:  Prior or Outpatient Records Reviewed [Y/N]:

## 2025-02-10 NOTE — PROGRESS NOTE ADULT - ASSESSMENT
80 y/o male w/ PMHx CAD s/p CABG, HFrEF (LVIDd 5.2, LVEF 25-30%) w/ DC- ICD, AFib (Xarelto), severe AS s/p TAVR, COPD on 3-4L home O2, CKD3, HTN, HLD, T2DM, and BPH presented to [facility] for ADHF, despite escalation of home diuretics. Now transferred to [facility] for refractory volume overload. His labs notable for stable Scr (though unclear baseline).     Repeat TTE shows severe BiV dysfunction. On exam, he is volume up and will continue to diurese him with bumex gtt. Of note, he is DNR/DNI and has ICD on and please engage palliative for GOC discussions    Cardiac Studies  TTE 2/7/25 LVIDd 5.3cm, LVEF 30%, no LV thrombus, TAPSE 1.0cm, severely enlarged RV size/RVSF reduced, mod dilated LA, severe dilated RA, TAVR present, mod MR, severe TR, PASP 52, PASP 52, no pericardial effusion, IVC dilated 2.35cm  TTE 8/8/24; LVIDd 5.2, EF 25-30%, global hypokinesis, severe grade III DD, TASPE 1.1, TAVR present in AV position, mld-mod MR, mod TR, PASP 53

## 2025-02-10 NOTE — PROGRESS NOTE ADULT - ASSESSMENT
78 y/o male w/ PMHx CAD s/p CABG, HF (combined HFrEF [EF 25%] and HFpEF [G3DD]) w/ ICD, AFib on Xarelto, severe AS s/p TAVR, COPD on 3-4L home O2, CKD4, HTN, HLD, T2DM, and BPH who presents as a transfer from [facility] for HF evaluation. Ongoing CHF exacerbation on bumex gtt.

## 2025-02-10 NOTE — PROGRESS NOTE ADULT - PROBLEM SELECTOR PLAN 1
CHOOSE ONE: HFpEF + HFrEF  EF 30% with severe tricuspid regurg and PASP 55mmHG on 2/7/24  Heart failure, CHF order set initiated    Guideline directed medical therapy as below: (Name/Dose/Frequency)  - Diuretic:  bumex gtt 1mg/hr, Diamox dc'd  - BB:  Metoprolol succinate 50mg daily  - ARNI/ACE-I/ARB: Ramipril 2.5mg daily - hold given DENISHA on CKD  - Hydralazine/Nitrate: Add hydral 10mg TID. Isodril 10mg TID   - MRA: Was on spironolactone last year, will reintroduce pending denisha  - SGLT2: none

## 2025-02-10 NOTE — PROGRESS NOTE ADULT - PROBLEM SELECTOR PLAN 1
: Etiology likely ischemic. Primary cardiologist Dr. Jewel Stubbs ([facility]), last TTE LVEF 25-30%. Appears has been on some GDMT as outpt (?Farxiga, Coreg and previously on ARNI though unclear why stopped)  - Currently on Toprol 50mg QD  - Will defer ARB/ARNI/SGLT2I/MRA for now given degree of renal dysfunction.  - Afterload: c/w ISDN 10mg TID. Please increase HDZN 10mg to 25mg TID with hold parameter <90 SBP   - Diuretics: c/w bumex gtt 1mg/hr.   - Has ICD in place; Currently a DNR/DNI. Would engage palliative for clarification on GOC in regards to device.   - Trend twice daily BMP, LFTs, while on continuous loop diuretics  - Aggressive PT  - Daily Standing weight  - Strict I/O's  - Target electrolyte repletion to target K+ >4.0 and Mg >2.0.

## 2025-02-10 NOTE — PROGRESS NOTE ADULT - SUBJECTIVE AND OBJECTIVE BOX
Subjective:  -resting in bed  -had some nosebleed overnight    Medications:  acetaminophen     Tablet .. 650 milliGRAM(s) Oral every 6 hours PRN  albuterol/ipratropium for Nebulization 3 milliLiter(s) Nebulizer every 6 hours PRN  aspirin enteric coated 81 milliGRAM(s) Oral daily  atorvastatin 20 milliGRAM(s) Oral at bedtime  buMETAnide Infusion 1 mG/Hr IV Continuous <Continuous>  dextrose 5%. 1000 milliLiter(s) IV Continuous <Continuous>  dextrose 5%. 1000 milliLiter(s) IV Continuous <Continuous>  dextrose 50% Injectable 25 Gram(s) IV Push once  dextrose 50% Injectable 12.5 Gram(s) IV Push once  dextrose 50% Injectable 25 Gram(s) IV Push once  dextrose Oral Gel 15 Gram(s) Oral once PRN  fluticasone propionate/ salmeterol 250-50 MICROgram(s) Diskus 1 Dose(s) Inhalation two times a day  glucagon  Injectable 1 milliGRAM(s) IntraMuscular once  hydrALAZINE 25 milliGRAM(s) Oral three times a day  insulin glargine Injectable (LANTUS) 32 Unit(s) SubCutaneous at bedtime  insulin lispro (ADMELOG) corrective regimen sliding scale   SubCutaneous three times a day before meals  insulin lispro Injectable (ADMELOG) 3 Unit(s) SubCutaneous three times a day before meals  isosorbide   dinitrate Tablet (ISORDIL) 10 milliGRAM(s) Oral three times a day  melatonin 3 milliGRAM(s) Oral at bedtime PRN  metoprolol succinate ER 50 milliGRAM(s) Oral daily  polyethylene glycol 3350 17 Gram(s) Oral two times a day  potassium chloride    Tablet ER 40 milliEquivalent(s) Oral three times a day  rivaroxaban 15 milliGRAM(s) Oral with dinner  sodium chloride 0.65% Nasal 1 Spray(s) Both Nostrils two times a day  tamsulosin 0.4 milliGRAM(s) Oral at bedtime  tiotropium 2.5 MICROgram(s) Inhaler 2 Puff(s) Inhalation daily      Physical Exam:    Vitals:  Vital Signs Last 24 Hours  T(C): 36.4 (02-10-25 @ 11:39), Max: 36.6 (25 @ 15:40)  HR: 67 (02-10-25 @ 14:24) (66 - 74)  BP: 103/61 (02-10-25 @ 11:39) (99/59 - 128/74)  RR: 18 (02-10-25 @ 14:24) (18 - 18)  SpO2: 95% (02-10-25 @ 14:24) (95% - 100%)    Weight in k.5 (02-10 @ 08:00)    I&O's Summary    2025 07:01  -  10 Feb 2025 07:00  --------------------------------------------------------  IN: 1700 mL / OUT: 1900 mL / NET: -200 mL    10 Feb 2025 07:01  -  10 Feb 2025 15:17  --------------------------------------------------------  IN: 480 mL / OUT: 1500 mL / NET: -1020 mL        Tele: Vpaced 70s    General: No distress. Comfortable.  HEENT: EOM intact.  Neck: Neck supple. JVP elevated 12-14 cm H20  Chest: Clear to auscultation bilaterally  CV: Normal S1 and S2. No murmurs, rub, or gallops. Radial pulses normal.  Abdomen: Soft, non-distended, non-tender  Skin: No rashes or skin breakdown  Extremities: + BLE edema  Neurology: Alert and oriented times three. Sensation intact  Psych: Affect normal    Labs:                        9.5    11.30 )-----------( 228      ( 2025 03:19 )             35.2     02-10    142  |  97  |  59[H]  ----------------------------<  161[H]  4.7   |  26  |  2.24[H]    Ca    9.8      10 Feb 2025 02:56  Phos  4.2     02-10  Mg     2.2     02-10                  Lactate, Blood: 1.1 mmol/L ( @ 07:03)

## 2025-02-10 NOTE — PROGRESS NOTE ADULT - SUBJECTIVE AND OBJECTIVE BOX
No distress, on NC O2    Vital Signs Last 24 Hrs  T(C): 36.4 (02-10-25 @ 11:39), Max: 36.4 (02-09-25 @ 20:50)  T(F): 97.6 (02-10-25 @ 11:39), Max: 97.6 (02-09-25 @ 20:50)  HR: 67 (02-10-25 @ 14:24) (66 - 72)  BP: 103/61 (02-10-25 @ 11:39) (99/59 - 124/77)  RR: 18 (02-10-25 @ 14:24) (18 - 18)  SpO2: 95% (02-10-25 @ 14:24) (95% - 100%)    I&O's Detail    09 Feb 2025 07:01  -  10 Feb 2025 07:00  --------------------------------------------------------  IN:    Bumetanide: 60 mL    Oral Fluid: 1640 mL  Total IN: 1700 mL    OUT:    Voided (mL): 1900 mL  Total OUT: 1900 mL    10 Feb 2025 07:01  -  10 Feb 2025 17:14  --------------------------------------------------------  IN:    Oral Fluid: 480 mL  Total IN: 480 mL    OUT:    Voided (mL): 1500 mL  Total OUT: 1500 mL    s1s2  b/l air entry  soft, ND  sm edema                                  9.5    11.30 )-----------( 228      ( 09 Feb 2025 03:19 )             35.2     10 Feb 2025 02:56    142    |  97     |  59     ----------------------------<  161    4.7     |  26     |  2.24     Ca    9.8        10 Feb 2025 02:56  Phos  4.2       10 Feb 2025 02:56  Mg     2.2       10 Feb 2025 02:56    acetaminophen     Tablet .. 650 milliGRAM(s) Oral every 6 hours PRN  albuterol/ipratropium for Nebulization 3 milliLiter(s) Nebulizer every 6 hours PRN  aspirin enteric coated 81 milliGRAM(s) Oral daily  atorvastatin 20 milliGRAM(s) Oral at bedtime  buMETAnide Infusion 1 mG/Hr IV Continuous <Continuous>  dextrose 5%. 1000 milliLiter(s) IV Continuous <Continuous>  dextrose 5%. 1000 milliLiter(s) IV Continuous <Continuous>  dextrose 50% Injectable 25 Gram(s) IV Push once  dextrose 50% Injectable 12.5 Gram(s) IV Push once  dextrose 50% Injectable 25 Gram(s) IV Push once  dextrose Oral Gel 15 Gram(s) Oral once PRN  fluticasone propionate/ salmeterol 250-50 MICROgram(s) Diskus 1 Dose(s) Inhalation two times a day  glucagon  Injectable 1 milliGRAM(s) IntraMuscular once  hydrALAZINE 25 milliGRAM(s) Oral three times a day  insulin glargine Injectable (LANTUS) 32 Unit(s) SubCutaneous at bedtime  insulin lispro (ADMELOG) corrective regimen sliding scale   SubCutaneous three times a day before meals  insulin lispro Injectable (ADMELOG) 3 Unit(s) SubCutaneous three times a day before meals  isosorbide   dinitrate Tablet (ISORDIL) 10 milliGRAM(s) Oral three times a day  melatonin 3 milliGRAM(s) Oral at bedtime PRN  metoprolol succinate ER 50 milliGRAM(s) Oral daily  polyethylene glycol 3350 17 Gram(s) Oral two times a day  potassium chloride    Tablet ER 40 milliEquivalent(s) Oral three times a day  rivaroxaban 15 milliGRAM(s) Oral with dinner  sodium chloride 0.65% Nasal 1 Spray(s) Both Nostrils two times a day  tamsulosin 0.4 milliGRAM(s) Oral at bedtime  tiotropium 2.5 MICROgram(s) Inhaler 2 Puff(s) Inhalation daily    A/P:    CM, EF ~ 30%, severe TR  Adm to [facility] VS 1/10 w/fluid overload   CKD 3/4 stable (baseline Cr ~ 2. - 2.5)  Bumex qtt per HF team  F/u BMP, Mg, UO  Avoid nephrotoxins as able    [phone]

## 2025-02-11 DIAGNOSIS — Z71.89 OTHER SPECIFIED COUNSELING: ICD-10-CM

## 2025-02-11 DIAGNOSIS — Z51.5 ENCOUNTER FOR PALLIATIVE CARE: ICD-10-CM

## 2025-02-11 LAB
ANION GAP SERPL CALC-SCNC: 13 MMOL/L — SIGNIFICANT CHANGE UP (ref 5–17)
ANION GAP SERPL CALC-SCNC: 14 MMOL/L — SIGNIFICANT CHANGE UP (ref 5–17)
BUN SERPL-MCNC: 61 MG/DL — HIGH (ref 7–23)
BUN SERPL-MCNC: 66 MG/DL — HIGH (ref 7–23)
CALCIUM SERPL-MCNC: 9.5 MG/DL — SIGNIFICANT CHANGE UP (ref 8.4–10.5)
CALCIUM SERPL-MCNC: 9.7 MG/DL — SIGNIFICANT CHANGE UP (ref 8.4–10.5)
CHLORIDE SERPL-SCNC: 94 MMOL/L — LOW (ref 96–108)
CHLORIDE SERPL-SCNC: 95 MMOL/L — LOW (ref 96–108)
CO2 SERPL-SCNC: 31 MMOL/L — SIGNIFICANT CHANGE UP (ref 22–31)
CO2 SERPL-SCNC: 33 MMOL/L — HIGH (ref 22–31)
CREAT SERPL-MCNC: 2.15 MG/DL — HIGH (ref 0.5–1.3)
CREAT SERPL-MCNC: 2.65 MG/DL — HIGH (ref 0.5–1.3)
EGFR: 23 ML/MIN/1.73M2 — LOW
EGFR: 23 ML/MIN/1.73M2 — LOW
EGFR: 30 ML/MIN/1.73M2 — LOW
EGFR: 30 ML/MIN/1.73M2 — LOW
GLUCOSE BLDC GLUCOMTR-MCNC: 119 MG/DL — HIGH (ref 70–99)
GLUCOSE BLDC GLUCOMTR-MCNC: 213 MG/DL — HIGH (ref 70–99)
GLUCOSE BLDC GLUCOMTR-MCNC: 221 MG/DL — HIGH (ref 70–99)
GLUCOSE BLDC GLUCOMTR-MCNC: 222 MG/DL — HIGH (ref 70–99)
GLUCOSE SERPL-MCNC: 129 MG/DL — HIGH (ref 70–99)
GLUCOSE SERPL-MCNC: 215 MG/DL — HIGH (ref 70–99)
MAGNESIUM SERPL-MCNC: 2.2 MG/DL — SIGNIFICANT CHANGE UP (ref 1.6–2.6)
MAGNESIUM SERPL-MCNC: 2.2 MG/DL — SIGNIFICANT CHANGE UP (ref 1.6–2.6)
POTASSIUM SERPL-MCNC: 3.9 MMOL/L — SIGNIFICANT CHANGE UP (ref 3.5–5.3)
POTASSIUM SERPL-MCNC: 5.5 MMOL/L — HIGH (ref 3.5–5.3)
POTASSIUM SERPL-SCNC: 3.9 MMOL/L — SIGNIFICANT CHANGE UP (ref 3.5–5.3)
POTASSIUM SERPL-SCNC: 5.5 MMOL/L — HIGH (ref 3.5–5.3)
SODIUM SERPL-SCNC: 138 MMOL/L — SIGNIFICANT CHANGE UP (ref 135–145)
SODIUM SERPL-SCNC: 142 MMOL/L — SIGNIFICANT CHANGE UP (ref 135–145)

## 2025-02-11 PROCEDURE — 99223 1ST HOSP IP/OBS HIGH 75: CPT

## 2025-02-11 PROCEDURE — 99232 SBSQ HOSP IP/OBS MODERATE 35: CPT

## 2025-02-11 PROCEDURE — 99233 SBSQ HOSP IP/OBS HIGH 50: CPT

## 2025-02-11 PROCEDURE — 99497 ADVNCD CARE PLAN 30 MIN: CPT | Mod: 25

## 2025-02-11 RX ORDER — ISOSORBDIE DINITRATE 30 MG/1
10 TABLET ORAL THREE TIMES A DAY
Refills: 0 | Status: DISCONTINUED | OUTPATIENT
Start: 2025-02-11 | End: 2025-02-19

## 2025-02-11 RX ORDER — BUMETANIDE 1 MG/1
1 TABLET ORAL
Qty: 20 | Refills: 0 | Status: DISCONTINUED | OUTPATIENT
Start: 2025-02-11 | End: 2025-02-17

## 2025-02-11 RX ORDER — METOPROLOL SUCCINATE 50 MG/1
50 TABLET, EXTENDED RELEASE ORAL DAILY
Refills: 0 | Status: DISCONTINUED | OUTPATIENT
Start: 2025-02-11 | End: 2025-02-11

## 2025-02-11 RX ORDER — METOPROLOL SUCCINATE 50 MG/1
25 TABLET, EXTENDED RELEASE ORAL DAILY
Refills: 0 | Status: DISCONTINUED | OUTPATIENT
Start: 2025-02-12 | End: 2025-02-14

## 2025-02-11 RX ORDER — SODIUM ZIRCONIUM CYCLOSILICATE 5 G/5G
10 POWDER, FOR SUSPENSION ORAL ONCE
Refills: 0 | Status: COMPLETED | OUTPATIENT
Start: 2025-02-11 | End: 2025-02-11

## 2025-02-11 RX ADMIN — INSULIN LISPRO 4: 100 INJECTION, SOLUTION INTRAVENOUS; SUBCUTANEOUS at 18:00

## 2025-02-11 RX ADMIN — TIOTROPIUM BROMIDE INHALATION SPRAY 2 PUFF(S): 3.12 SPRAY, METERED RESPIRATORY (INHALATION) at 12:46

## 2025-02-11 RX ADMIN — INSULIN LISPRO 4: 100 INJECTION, SOLUTION INTRAVENOUS; SUBCUTANEOUS at 12:42

## 2025-02-11 RX ADMIN — ATORVASTATIN CALCIUM 20 MILLIGRAM(S): 80 TABLET, FILM COATED ORAL at 21:41

## 2025-02-11 RX ADMIN — METOPROLOL SUCCINATE 50 MILLIGRAM(S): 50 TABLET, EXTENDED RELEASE ORAL at 06:28

## 2025-02-11 RX ADMIN — Medication 1 DOSE(S): at 18:05

## 2025-02-11 RX ADMIN — INSULIN LISPRO 3 UNIT(S): 100 INJECTION, SOLUTION INTRAVENOUS; SUBCUTANEOUS at 08:57

## 2025-02-11 RX ADMIN — INSULIN GLARGINE-YFGN 32 UNIT(S): 100 INJECTION, SOLUTION SUBCUTANEOUS at 21:39

## 2025-02-11 RX ADMIN — RIVAROXABAN 15 MILLIGRAM(S): 10 TABLET, FILM COATED ORAL at 18:01

## 2025-02-11 RX ADMIN — Medication 1 SPRAY(S): at 18:01

## 2025-02-11 RX ADMIN — BUMETANIDE 5 MG/HR: 1 TABLET ORAL at 21:25

## 2025-02-11 RX ADMIN — ISOSORBDIE DINITRATE 10 MILLIGRAM(S): 30 TABLET ORAL at 18:01

## 2025-02-11 RX ADMIN — INSULIN LISPRO 3 UNIT(S): 100 INJECTION, SOLUTION INTRAVENOUS; SUBCUTANEOUS at 12:42

## 2025-02-11 RX ADMIN — Medication 40 MILLIEQUIVALENT(S): at 06:28

## 2025-02-11 RX ADMIN — Medication 1 SPRAY(S): at 06:28

## 2025-02-11 RX ADMIN — Medication 1 DOSE(S): at 06:27

## 2025-02-11 RX ADMIN — Medication 25 MILLIGRAM(S): at 06:44

## 2025-02-11 RX ADMIN — TAMSULOSIN HYDROCHLORIDE 0.4 MILLIGRAM(S): 0.4 CAPSULE ORAL at 21:40

## 2025-02-11 RX ADMIN — INSULIN LISPRO 3 UNIT(S): 100 INJECTION, SOLUTION INTRAVENOUS; SUBCUTANEOUS at 18:00

## 2025-02-11 RX ADMIN — SODIUM ZIRCONIUM CYCLOSILICATE 10 GRAM(S): 5 POWDER, FOR SUSPENSION ORAL at 23:16

## 2025-02-11 RX ADMIN — POLYETHYLENE GLYCOL 3350 17 GRAM(S): 17 POWDER, FOR SOLUTION ORAL at 06:28

## 2025-02-11 RX ADMIN — Medication 1000 MILLILITER(S): at 10:16

## 2025-02-11 RX ADMIN — ISOSORBDIE DINITRATE 10 MILLIGRAM(S): 30 TABLET ORAL at 06:28

## 2025-02-11 RX ADMIN — Medication 40 MILLIEQUIVALENT(S): at 12:43

## 2025-02-11 RX ADMIN — Medication 10 MILLIGRAM(S): at 21:41

## 2025-02-11 RX ADMIN — Medication 81 MILLIGRAM(S): at 12:43

## 2025-02-11 RX ADMIN — BUMETANIDE 5 MG/HR: 1 TABLET ORAL at 03:59

## 2025-02-11 NOTE — PROGRESS NOTE ADULT - SUBJECTIVE AND OBJECTIVE BOX
Subjective:  -had brief hypotensive episode - received bolus 500cc and bumex gtt stopped  -now is feeling better    Medications:  acetaminophen     Tablet .. 650 milliGRAM(s) Oral every 6 hours PRN  albuterol/ipratropium for Nebulization 3 milliLiter(s) Nebulizer every 6 hours PRN  aspirin enteric coated 81 milliGRAM(s) Oral daily  atorvastatin 20 milliGRAM(s) Oral at bedtime  dextrose 5%. 1000 milliLiter(s) IV Continuous <Continuous>  dextrose 5%. 1000 milliLiter(s) IV Continuous <Continuous>  dextrose 50% Injectable 25 Gram(s) IV Push once  dextrose 50% Injectable 12.5 Gram(s) IV Push once  dextrose 50% Injectable 25 Gram(s) IV Push once  dextrose Oral Gel 15 Gram(s) Oral once PRN  fluticasone propionate/ salmeterol 250-50 MICROgram(s) Diskus 1 Dose(s) Inhalation two times a day  glucagon  Injectable 1 milliGRAM(s) IntraMuscular once  hydrALAZINE 10 milliGRAM(s) Oral three times a day  insulin glargine Injectable (LANTUS) 32 Unit(s) SubCutaneous at bedtime  insulin lispro (ADMELOG) corrective regimen sliding scale   SubCutaneous three times a day before meals  insulin lispro Injectable (ADMELOG) 3 Unit(s) SubCutaneous three times a day before meals  isosorbide   dinitrate Tablet (ISORDIL) 10 milliGRAM(s) Oral three times a day  melatonin 3 milliGRAM(s) Oral at bedtime PRN  polyethylene glycol 3350 17 Gram(s) Oral two times a day  rivaroxaban 15 milliGRAM(s) Oral with dinner  sodium chloride 0.65% Nasal 1 Spray(s) Both Nostrils two times a day  tamsulosin 0.4 milliGRAM(s) Oral at bedtime  tiotropium 2.5 MICROgram(s) Inhaler 2 Puff(s) Inhalation daily      Physical Exam:    Vitals:  Vital Signs Last 24 Hours  T(C): 36.6 (25 @ 12:31), Max: 37.1 (25 @ 00:45)  HR: 69 (25 @ 12:31) (64 - 77)  BP: 96/55 (25 @ 12:31) (76/42 - 110/68)  RR: 18 (25 @ 12:31) (18 - 18)  SpO2: 96% (25 @ 12:31) (94% - 100%)    Weight in k.8 ( @ 00:45)    I&O's Summary    10 Feb 2025 07:  -  2025 07:00  --------------------------------------------------------  IN: 1040 mL / OUT: 2600 mL / NET: -1560 mL    2025 07:  -  2025 14:33  --------------------------------------------------------  IN: 180 mL / OUT: 0 mL / NET: 180 mL        Tele: V paced    General: No distress. Comfortable.  HEENT: EOM intact.  Neck: Neck supple. JVP elevated around 14 cm H20  Chest: Clear to auscultation bilaterally  CV: Normal S1 and S2. No murmurs, rub, or gallops. Radial pulses normal.  Abdomen: Soft, non-distended, non-tender  Skin: No rashes or skin breakdown  Extremities: + BLE from foot to knee  Neurology: Alert and oriented times three. Sensation intact  Psych: Affect normal    Labs:        142  |  95[L]  |  61[H]  ----------------------------<  129[H]  3.9   |  33[H]  |  2.15[H]    Ca    9.7      2025 06:42  Phos  4.2     0210  Mg     2.2

## 2025-02-11 NOTE — CONSULT NOTE ADULT - SUBJECTIVE AND OBJECTIVE BOX
Time and date of service: 02-11-25 @ 11:24      HPI:  This is an 80 y/o male w/ PMHx CAD s/p CABG, HF (combined HFrEF [EF 25%] and HFpEF [G3DD]) w/ ICD, AFib on Xarelto, severe AS s/p TAVR, COPD on 3-4L home O2, CKD3, HTN, HLD, T2DM, and BPH who presents as a transfer from [facility] for HF evaluation. He presented to [place] on 1/10/25 for worsening of his chronic SOB for 2 weeks, with associated increased swelling of legs and abdomen. He was admitted for acute on chronic hypoxic respiratory failure due to both anemia and acute on chronic CHF exacerbation. He was anemic to 6.5 on presentation there and hypoxic requiring Bipap. He was started on bumex drip, then transitioned to IVP lasix 80mg BID, then had to be transitioned to diamox due to his course being complicated by contraction alkalosis and hypokalemia. Then had Bumex 2mg BID started as he was still volume overloaded. He is now back to his baseline O2 requirements and was transferred here for evaluation by HF team per recommendation by cardiology at [facility] due to his persistent SOB with minimal exertion.     Here he is afebrile, hemodynamically stable, saturating 92% on 3LNC. States that he isn't feeling any SOB at the moment. Says his legs are still swollen but they are much better than when he was admitted to [facility].    (05 Feb 2025 21:35)    PERTINENT PM/SXH:   HTN (hypertension)    DM (diabetes mellitus), type 2    High cholesterol    CHF (congestive heart failure)    Pneumonia    COPD (chronic obstructive pulmonary disease)    Pacemaker    AICD (automatic cardioverter/defibrillator) present    Chronic renal insufficiency    HLD (hyperlipidemia)    Insulin dependent type 2 diabetes mellitus    Chronic hypoxic respiratory failure, on home oxygen therapy    Stage 4 chronic kidney disease    Chronic combined systolic and diastolic heart failure    Unspecified atrial fibrillation      S/P CABG x 3    S/P cardiac pacemaker procedure    S/P hernia repair    S/P cardiac cath    S/P hernia repair    S/P implantation of automatic cardioverter/defibrillator (AICD)      FAMILY HISTORY:  FH: HTN (hypertension)      Family Hx substance abuse [ ]yes [x ]no    ITEMS NOT CHECKED ARE NOT PRESENT    SOCIAL HISTORY:   Significant other/partner[x ]  Children[x ]  Hinduism/Spirituality:  Substance hx:  [ ]   Tobacco hx:  [ ]   Alcohol hx: [ ]   Home Opioid hx:  [ ] I-Stop Reference No:  Living Situation: [ x]Home  [ ]Long term care  [ ]Rehab [ ]Other    ADVANCE DIRECTIVES:    DNR/MOLST  [ ]  Living Will  [ ]   DECISION MAKER(s):  [ ] Health Care Proxy(s)  [ ] Surrogate(s)  [ ] Guardian           Name(s): Phone Number(s): Judi [phone]    BASELINE (I)ADL(s) (prior to admission):  Celina: [ ]Total  [x ] Moderate [ ]Dependent    Allergies    No Known Allergies    Intolerances    MEDICATIONS  (STANDING):  aspirin enteric coated 81 milliGRAM(s) Oral daily  atorvastatin 20 milliGRAM(s) Oral at bedtime  dextrose 5%. 1000 milliLiter(s) (100 mL/Hr) IV Continuous <Continuous>  dextrose 5%. 1000 milliLiter(s) (50 mL/Hr) IV Continuous <Continuous>  dextrose 50% Injectable 25 Gram(s) IV Push once  dextrose 50% Injectable 12.5 Gram(s) IV Push once  dextrose 50% Injectable 25 Gram(s) IV Push once  fluticasone propionate/ salmeterol 250-50 MICROgram(s) Diskus 1 Dose(s) Inhalation two times a day  glucagon  Injectable 1 milliGRAM(s) IntraMuscular once  hydrALAZINE 25 milliGRAM(s) Oral three times a day  insulin glargine Injectable (LANTUS) 32 Unit(s) SubCutaneous at bedtime  insulin lispro (ADMELOG) corrective regimen sliding scale   SubCutaneous three times a day before meals  insulin lispro Injectable (ADMELOG) 3 Unit(s) SubCutaneous three times a day before meals  isosorbide   dinitrate Tablet (ISORDIL) 10 milliGRAM(s) Oral three times a day  metoprolol succinate ER 50 milliGRAM(s) Oral daily  polyethylene glycol 3350 17 Gram(s) Oral two times a day  potassium chloride    Tablet ER 40 milliEquivalent(s) Oral three times a day  rivaroxaban 15 milliGRAM(s) Oral with dinner  sodium chloride 0.65% Nasal 1 Spray(s) Both Nostrils two times a day  tamsulosin 0.4 milliGRAM(s) Oral at bedtime  tiotropium 2.5 MICROgram(s) Inhaler 2 Puff(s) Inhalation daily    MEDICATIONS  (PRN):  acetaminophen     Tablet .. 650 milliGRAM(s) Oral every 6 hours PRN Temp greater or equal to 38C (100.4F), Mild Pain (1 - 3)  albuterol/ipratropium for Nebulization 3 milliLiter(s) Nebulizer every 6 hours PRN Shortness of Breath and/or Wheezing  dextrose Oral Gel 15 Gram(s) Oral once PRN Blood Glucose LESS THAN 70 milliGRAM(s)/deciliter  melatonin 3 milliGRAM(s) Oral at bedtime PRN Insomnia    PRESENT SYMPTOMS: [ ]Unable to self-report  [ ] CPOT [ ] PAINADs [ ] RDOS  Source if other than patient:  [ ]Family   [ ]Team     Pain: [ ]yes [ x]no  QOL impact -   Location -                    Aggravating factors -  Quality -  Radiation -  Timing-  Severity (0-10 scale):  Minimal acceptable level (0-10 scale):     CPOT:    https://www.[facility].org/getattachment/[email or web address]/Critical-Care-Pain-Observation-Tool-(CPOT)    PAIN AD Score:   http://geriatrictoolkit.[place].[facility]/cog/painad.pdf (press ctrl +  left click to view)    Dyspnea:                           [ ]Mild [ ]Moderate [ ]Severe      RDOS:  0 to 2  minimal or no respiratory distress   3  mild distress  4 to 6 moderate distress  >7 severe distress  https://homecareinformation.net/handouts/hen/Respiratory_Distress_Observation_Scale.pdf (Ctrl +  left click to view)     Anxiety:                             [ ]Mild [ ]Moderate [ ]Severe  Fatigue:                             [ ]Mild [xModerate [ ]Severe  Nausea:                             [ ]Mild [ ]Moderate [ ]Severe  Loss of appetite:              [x ]Mild [ ]Moderate [ ]Severe  Constipation:                    [ ]Mild [ ]Moderate [ ]Severe    PCSSQ[Palliative Care Spiritual Screening Question]   Severity (0-10):  Score of 4 or > indicate consideration of Chaplaincy referral.  Chaplaincy Referral: [ ] yes [ ] refused [ ] following [x ] Deferred     Caregiver Wallace? : [ ] yes [ ] no [x ] Deferred [ ] Declined             Social work referral [ ] Patient & Family Centered Care Referral [ ]     Anticipatory Grief present?:  [ ] yes [ ] no  [ x] Deferred                  Social work referral [ ] Chaplaincy Referral[ ]      Other Symptoms:  x[ ]All other review of systems negative     Palliative Performance Status Version 2:      30-40   %    http://[facility].org/files/news/palliative_performance_scale_ppsv2.pdf  PHYSICAL EXAM:  Vital Signs Last 24 Hrs  T(C): 37.1 (11 Feb 2025 00:45), Max: 37.1 (11 Feb 2025 00:45)  T(F): 98.7 (11 Feb 2025 00:45), Max: 98.7 (11 Feb 2025 00:45)  HR: 70 (11 Feb 2025 10:33) (64 - 77)  BP: 80/50 (11 Feb 2025 10:33) (76/42 - 110/68)  BP(mean): --  RR: 18 (11 Feb 2025 00:45) (18 - 18)  SpO2: 94% (11 Feb 2025 10:12) (94% - 100%)    Parameters below as of 11 Feb 2025 00:45  Patient On (Oxygen Delivery Method): nasal cannula  O2 Flow (L/min): 3   I&O's Summary    10 Feb 2025 07:01  -  11 Feb 2025 07:00  --------------------------------------------------------  IN: 1040 mL / OUT: 2600 mL / NET: -1560 mL      GENERAL: [ ]Cachexia    [ x]Alert  [x ]Oriented x   [ x]Lethargic  [ ]Unarousable  [ x]Verbal  [ ]Non-Verbal  Behavioral:   [ ] Anxiety  [ ] Delirium [ ] Agitation [ ] Other  HEENT:  x]Normal   [ ]Dry mouth   [ ]ET Tube/Trach  [ ]Oral lesions  PULMONARY:   [x ]Clear [ ]Tachypnea  [ ]Audible excessive secretions   [ ]Rhonchi        [ ]Right [ ]Left [ ]Bilateral  [ ]Crackles        [ ]Right [ ]Left [ ]Bilateral  [ ]Wheezing     [ ]Right [ ]Left [ ]Bilateral  [x ]Diminished breath sounds [ ]right [ ]left [ x]bilateral  CARDIOVASCULAR:    [x ]Regular [ ]Irregular [ ]Tachy  [ ]Oscar [ ]Murmur [ ]Other  GASTROINTESTINAL:  [x ]Soft  [ ]Distended   [ ]x+BS  x]Non tender [ ]Tender  [ ]Other [ ]PEG [ ]OGT/ NGT  Last BM:  GENITOURINARY:  [x ]Normal [ ] Incontinent   [ ]Oliguria/Anuria   [ ]Bucio  MUSCULOSKELETAL:   [ ]xNormal   [ ]Weakness  [ ]Bed/Wheelchair bound [ ]Edema  NEUROLOGIC:   [x ]No focal deficits  [ ]Cognitive impairment  [ ]Dysphagia [ ]Dysarthria [ ]Paresis [ ]Other   SKIN:   [x ]Normal  [ ]Rash  [ ]Other  [ ]Pressure ulcer(s)       Present on admission [ ]y [ ]n    CRITICAL CARE:  [ ] Shock Present  [ ]Septic [ ]Cardiogenic [ ]Neurologic [ ]Hypovolemic  [ ]  Vasopressors [ ]  Inotropes   [ ]Respiratory failure present [ ]Mechanical ventilation [ ]Non-invasive ventilatory support [ ]High flow    [ ]Acute  [ ]Chronic [ ]Hypoxic  [ ]Hypercarbic [ ]Other  [ ]Other organ failure     LABS:  02-11    142  |  95[L]  |  61[H]  ----------------------------<  129[H]  3.9   |  33[H]  |  2.15[H]    Ca    9.7      11 Feb 2025 06:42  Phos  4.2     02-10  Mg     2.2     02-11        Urinalysis Basic - ( 11 Feb 2025 06:42 )    Color: x / Appearance: x / SG: x / pH: x  Gluc: 129 mg/dL / Ketone: x  / Bili: x / Urobili: x   Blood: x / Protein: x / Nitrite: x   Leuk Esterase: x / RBC: x / WBC x   Sq Epi: x / Non Sq Epi: x / Bacteria: x      RADIOLOGY & ADDITIONAL STUDIES:   < from: Xray Chest 1 View- PORTABLE-Routine (Xray Chest 1 View- PORTABLE-Routine .) (02.07.25 @ 14:29) >  ACC: [id number] EXAM:  XR CHEST PORTABLE ROUTINE 1V   ORDERED BY:  ADDISON FLORES     PROCEDURE DATE:  02/07/2025          INTERPRETATION:  HISTORY: Admitting Dxs: I50.9 HEART FAILURE,   UNSPECIFIED;  hypoxia;  TECHNIQUE: Portable frontal view of the chest, 1 view.  COMPARISON: 1/25/2025  FINDINGS/  IMPRESSION:  A cardiac device overlies and obscures the left hemithorax with leads in   place.  HEART:  Enlarged. Patient is status post TAVR.  LUNGS: There is opacity at the left base compatible with   effusion/infiltrate. Similar to prior.  BONES: sternotomy wires    --- End of Report ---            MARK KIMBALL MD; Attending Interventional Radiologist  This document has been electronically signed. Feb 9 2025  2:50PM    < end of copied text >      PROTEIN CALORIE MALNUTRITION PRESENT: [ ]mild [ ]moderate [ ]severe [ ]underweight [ ]morbid obesity  https://www.andeal.org/vault/2440/web/files/ONC/Table_Clinical%20Characteristics%20to%20Document%20Malnutrition-White%20JV%20et%20al%202012.pdf    Height (cm): 177.8 (01-10-25 @ 15:59), 180.3 (08-07-24 @ 17:24)  Weight (kg): 107.501 (02-08-25 @ 09:08), 110.9 (01-18-25 @ 10:27), 108.9 (08-07-24 @ 17:24)  BMI (kg/m2): 34 (02-08-25 @ 09:08), 35.1 (01-18-25 @ 10:27), 34.4 (01-10-25 @ 15:59)    [ ]PPSV2 < or = to 30% [ ]significant weight loss  [ ]poor nutritional intake  [ ]anasarca[ ]Artificial Nutrition      Other REFERRALS:  [ ]Hospice  [ ]Child Life  [ ]Social Work  [ ]Case management [ ]Holistic Therapy     Goals of Care Document:

## 2025-02-11 NOTE — PROGRESS NOTE ADULT - SUBJECTIVE AND OBJECTIVE BOX
No distress, on NC O2    Vital Signs Last 24 Hrs  T(C): 36.6 (02-11-25 @ 12:31), Max: 37.1 (02-11-25 @ 00:45)  T(F): 97.8 (02-11-25 @ 12:31), Max: 98.7 (02-11-25 @ 00:45)  HR: 69 (02-11-25 @ 12:31) (64 - 77)  BP: 96/55 (02-11-25 @ 12:31) (76/42 - 110/68)  RR: 18 (02-11-25 @ 12:31) (18 - 18)  SpO2: 96% (02-11-25 @ 12:31) (94% - 100%)    I&O's Detail    10 Feb 2025 07:01  -  11 Feb 2025 07:00  --------------------------------------------------------  IN:    Bumetanide: 60 mL    Oral Fluid: 980 mL  Total IN: 1040 mL    OUT:    Voided (mL): 2600 mL  Total OUT: 2600 mL    s1s2  b/l air entry  soft, ND  edema                       11 Feb 2025 06:42    142    |  95     |  61     ----------------------------<  129    3.9     |  33     |  2.15     Ca    9.7        11 Feb 2025 06:42  Phos  4.2       10 Feb 2025 02:56  Mg     2.2       11 Feb 2025 06:42    acetaminophen     Tablet .. 650 milliGRAM(s) Oral every 6 hours PRN  albuterol/ipratropium for Nebulization 3 milliLiter(s) Nebulizer every 6 hours PRN  aspirin enteric coated 81 milliGRAM(s) Oral daily  atorvastatin 20 milliGRAM(s) Oral at bedtime  dextrose 5%. 1000 milliLiter(s) IV Continuous <Continuous>  dextrose 5%. 1000 milliLiter(s) IV Continuous <Continuous>  dextrose 50% Injectable 25 Gram(s) IV Push once  dextrose 50% Injectable 12.5 Gram(s) IV Push once  dextrose 50% Injectable 25 Gram(s) IV Push once  dextrose Oral Gel 15 Gram(s) Oral once PRN  fluticasone propionate/ salmeterol 250-50 MICROgram(s) Diskus 1 Dose(s) Inhalation two times a day  glucagon  Injectable 1 milliGRAM(s) IntraMuscular once  hydrALAZINE 10 milliGRAM(s) Oral three times a day  insulin glargine Injectable (LANTUS) 32 Unit(s) SubCutaneous at bedtime  insulin lispro (ADMELOG) corrective regimen sliding scale   SubCutaneous three times a day before meals  insulin lispro Injectable (ADMELOG) 3 Unit(s) SubCutaneous three times a day before meals  isosorbide   dinitrate Tablet (ISORDIL) 10 milliGRAM(s) Oral three times a day  melatonin 3 milliGRAM(s) Oral at bedtime PRN  metoprolol succinate ER 50 milliGRAM(s) Oral daily  polyethylene glycol 3350 17 Gram(s) Oral two times a day  potassium chloride    Tablet ER 40 milliEquivalent(s) Oral three times a day  rivaroxaban 15 milliGRAM(s) Oral with dinner  sodium chloride 0.65% Nasal 1 Spray(s) Both Nostrils two times a day  tamsulosin 0.4 milliGRAM(s) Oral at bedtime  tiotropium 2.5 MICROgram(s) Inhaler 2 Puff(s) Inhalation daily    A/P:    CM, EF ~ 30%, severe TR  Adm to [facility] VS 1/10 w/fluid overload   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 - 2.5)  Diuretics hald due to hypotension   F/u BMP, Mg, UO  Avoid nephrotoxins as able    [phone]

## 2025-02-11 NOTE — CONSULT NOTE ADULT - ASSESSMENT
80 y/o male w/ PMHx CAD s/p CABG, HFrEF (LVIDd 5.2, LVEF 25-30%) w/ DC- ICD, AFib (Xarelto), severe AS s/p TAVR, COPD on 3-4L home O2, CKD3, HTN, HLD, T2DM, and BPH presented to [facility] for ADHF, transferred to [facility] for refractory volume overload. His labs notable for stable Scr (though unclear baseline). Repeat TTE shows severe BiV dysfunction. Repeat TTE shows severe BiV dysfunction. On exam, he is volume up and will continue to diurese him with bumex gtt. Of note, he is DNR/DNI and has ICD. Palliative care called for GOC.

## 2025-02-11 NOTE — PROGRESS NOTE ADULT - SUBJECTIVE AND OBJECTIVE BOX
Contreras Brooks MD  Division of Hospital Medicine  Available on MS teams until 7pm  If no response or off-hours, page [phone]  -------------------------------------    Patient is a 79y old  Male who presents with a chief complaint of HF eval (11 Feb 2025 14:07)      SUBJECTIVE / OVERNIGHT EVENTS: hypotensive in AM 70's systolic  ADDITIONAL REVIEW OF SYSTEMS: pt improved after small fluid bolus, mentating at baseline. No other new complaints.     MEDICATIONS  (STANDING):  aspirin enteric coated 81 milliGRAM(s) Oral daily  atorvastatin 20 milliGRAM(s) Oral at bedtime  dextrose 5%. 1000 milliLiter(s) (50 mL/Hr) IV Continuous <Continuous>  dextrose 5%. 1000 milliLiter(s) (100 mL/Hr) IV Continuous <Continuous>  dextrose 50% Injectable 25 Gram(s) IV Push once  dextrose 50% Injectable 12.5 Gram(s) IV Push once  dextrose 50% Injectable 25 Gram(s) IV Push once  fluticasone propionate/ salmeterol 250-50 MICROgram(s) Diskus 1 Dose(s) Inhalation two times a day  glucagon  Injectable 1 milliGRAM(s) IntraMuscular once  hydrALAZINE 10 milliGRAM(s) Oral three times a day  insulin glargine Injectable (LANTUS) 32 Unit(s) SubCutaneous at bedtime  insulin lispro (ADMELOG) corrective regimen sliding scale   SubCutaneous three times a day before meals  insulin lispro Injectable (ADMELOG) 3 Unit(s) SubCutaneous three times a day before meals  isosorbide   dinitrate Tablet (ISORDIL) 10 milliGRAM(s) Oral three times a day  polyethylene glycol 3350 17 Gram(s) Oral two times a day  rivaroxaban 15 milliGRAM(s) Oral with dinner  sodium chloride 0.65% Nasal 1 Spray(s) Both Nostrils two times a day  tamsulosin 0.4 milliGRAM(s) Oral at bedtime  tiotropium 2.5 MICROgram(s) Inhaler 2 Puff(s) Inhalation daily    MEDICATIONS  (PRN):  acetaminophen     Tablet .. 650 milliGRAM(s) Oral every 6 hours PRN Temp greater or equal to 38C (100.4F), Mild Pain (1 - 3)  albuterol/ipratropium for Nebulization 3 milliLiter(s) Nebulizer every 6 hours PRN Shortness of Breath and/or Wheezing  dextrose Oral Gel 15 Gram(s) Oral once PRN Blood Glucose LESS THAN 70 milliGRAM(s)/deciliter  melatonin 3 milliGRAM(s) Oral at bedtime PRN Insomnia      CAPILLARY BLOOD GLUCOSE      POCT Blood Glucose.: 221 mg/dL (11 Feb 2025 12:34)  POCT Blood Glucose.: 119 mg/dL (11 Feb 2025 08:39)  POCT Blood Glucose.: 198 mg/dL (10 Feb 2025 21:56)  POCT Blood Glucose.: 287 mg/dL (10 Feb 2025 17:14)    I&O's Summary    10 Feb 2025 07:01  -  11 Feb 2025 07:00  --------------------------------------------------------  IN: 1040 mL / OUT: 2600 mL / NET: -1560 mL    11 Feb 2025 07:01  -  11 Feb 2025 14:17  --------------------------------------------------------  IN: 180 mL / OUT: 0 mL / NET: 180 mL        PHYSICAL EXAM:  Vital Signs Last 24 Hrs  T(C): 36.6 (11 Feb 2025 12:31), Max: 37.1 (11 Feb 2025 00:45)  T(F): 97.8 (11 Feb 2025 12:31), Max: 98.7 (11 Feb 2025 00:45)  HR: 69 (11 Feb 2025 12:31) (64 - 77)  BP: 96/55 (11 Feb 2025 12:31) (76/42 - 110/68)  BP(mean): --  RR: 18 (11 Feb 2025 12:31) (18 - 18)  SpO2: 96% (11 Feb 2025 12:31) (94% - 100%)    Parameters below as of 11 Feb 2025 12:31  Patient On (Oxygen Delivery Method): nasal cannula      CONSTITUTIONAL: NAD  EYES: PERRLA; conjunctiva and sclera clear  ENMT: MMM  NECK: Supple  RESPIRATORY: Normal respiratory effort; CTAB  CARDIOVASCULAR: RRR, no JVD, +2 pitting edema  ABDOMEN: Nontender to palpation, normoactive BS, no guarding/rigidity  MUSCLOSKELETAL:  no clubbing/cyanosis, no joint swelling or tenderness to palpation  PSYCH: A+O x 3, affect normal  NEUROLOGY: CN 2-12 are intact and symmetric; no gross sensory or motor deficits  SKIN: No rashes; no palpable lesions    LABS:    02-11    142  |  95[L]  |  61[H]  ----------------------------<  129[H]  3.9   |  33[H]  |  2.15[H]    Ca    9.7      11 Feb 2025 06:42  Phos  4.2     02-10  Mg     2.2     02-11            Urinalysis Basic - ( 11 Feb 2025 06:42 )    Color: x / Appearance: x / SG: x / pH: x  Gluc: 129 mg/dL / Ketone: x  / Bili: x / Urobili: x   Blood: x / Protein: x / Nitrite: x   Leuk Esterase: x / RBC: x / WBC x   Sq Epi: x / Non Sq Epi: x / Bacteria: x          RADIOLOGY & ADDITIONAL TESTS:  Results Reviewed:   Imaging Personally Reviewed:  Electrocardiogram Personally Reviewed:    COORDINATION OF CARE:  Care Discussed with Consultants/Other Providers [Y/N]: HF team  Prior or Outpatient Records Reviewed [Y/N]:

## 2025-02-11 NOTE — CONSULT NOTE ADULT - TIME BILLING
- Review of records, telemetry, vital signs and daily labs.   - General and cardiovascular physical examination.  - Generation of cardiovascular treatment plan.  - Coordination of care with primary team.
symptom assessment and management, supportive counseling, coordination of care, discussion and coordination with team.    I personally spent 30 minutes on advance care planning services with the patient. This time is separate and distinct from any other care management services provided on this date.    Mayra Stevenson, ALEKSANDRA-BC  Please contact me via Teams  between 6am-2pm. If not answering, please call the palliative care pager [phone]    After 2pm and on weekends, please see the contact information below:    In the event of newly developing, evolving, or worsening symptoms between 2pm and 5pm please contact the Palliative Medicine via extension [phone] for assistance.  After 5pm please contact team via pager (if the patient is at [facility] #[phone] or if the patient is at [facility] #[phone]) The Geriatric and Palliative Medicine service has coverage 24 hours a day/ 7 days a week to provide medical recommendations regarding symptom management needs via telephone

## 2025-02-11 NOTE — PROGRESS NOTE ADULT - ASSESSMENT
78 y/o male w/ PMHx CAD s/p CABG, HF (combined HFrEF [EF 25%] and HFpEF [G3DD]) w/ ICD, AFib on Xarelto, severe AS s/p TAVR, COPD on 3-4L home O2, CKD4, HTN, HLD, T2DM, and BPH who presents as a transfer from [facility] for HF evaluation. Ongoing CHF exacerbation on bumex gtt.

## 2025-02-11 NOTE — CONSULT NOTE ADULT - NSCONSULTADDITIONALINFOA_GEN_ALL_CORE
if pt acutely decompensates and goals are in line with comfort would Recommend:  Dilaudid 0.5 mg IV q2h prn dyspnea or tachypnea  Dilaudid 0.5 mg IV q2h prn severe pain and Dilaudid 0.2 mg IV q2h prn moderate pain  Ativan 0.2 mg IV q2h prn agitation   Glycopyrrolate 0.4 mg IV q6h prn copious oral secretions   Dulcolax suppository daily prn constipation   Acetaminophen suppository 650 mg q6h prn fever  Zofran 4 mg IV q8h prn nausea or vomiting    Recommend discontinuation of all medications and interventions that do not serve goal of promoting patient's comfort and dignity at end-of-life.    Please page for any acute symptoms or further questions or concerns.

## 2025-02-11 NOTE — PROGRESS NOTE ADULT - PROBLEM SELECTOR PLAN 1
: Etiology likely ischemic. Primary cardiologist Dr. Jewel Stubbs ([facility]), last TTE LVEF 25-30%. Appears has been on some GDMT as outpt (?Farxiga, Coreg and previously on ARNI though unclear why stopped)  - Currently on Toprol 50mg QD  - Will defer ARB/ARNI/SGLT2I/MRA for now given degree of renal dysfunction.  - Afterload: c/w ISDN 10mg TID. Please decrease HDZN from 25mg to 10mg TID with hold parameter <95 SBP. Had brief episode of hypotension 2/11 AM  - Diuretics: c/w bumex gtt 1mg/hr   - Has ICD in place; Currently a DNR/DNI: trial NIV. Discussed with palliative and at this time wants ICD on as per wife  - Trend twice daily BMP, LFTs, while on continuous loop diuretics  - Aggressive PT  - Daily Standing weight  - Strict I/O's  - Target electrolyte repletion to target K+ >4.0 and Mg >2.0.

## 2025-02-11 NOTE — CONSULT NOTE ADULT - CONVERSATION DETAILS
Discussed case with primary ACP.  Consult was placed for ICD clarification with wife and pt.     Met with pt and wife at bedside for greater then 16 mins discussing ACP and GOC. Pt listening and commenting when he felt appropriate or when asked a question.  Pts wife engaged in conversation regarding GOC.  Discussed pts hospital course and medical conditions. Plan is for pt to go to rehab if he is able to get stronger.  Discussed MOLST form with pt and wife. Confirmed DNR/DNI with trial of NIV. Pt wife reported that they are not shutting off the ICD.    Pt reports that he has been shocked before by it.  Pt wife is a retired RN and "knows what to expect"   Goal is to continue medical management to treat the underlying source of the problem as guided by the medical team.     Discussed above with ACP from medical team via team.  Palliative care will sign off.  Please reconsult if necessary.

## 2025-02-11 NOTE — PROGRESS NOTE ADULT - ASSESSMENT
78 y/o male w/ PMHx CAD s/p CABG, HFrEF (LVIDd 5.2, LVEF 25-30%) w/ DC- ICD, AFib (Xarelto), severe AS s/p TAVR, COPD on 3-4L home O2, CKD3, HTN, HLD, T2DM, and BPH presented to [facility] for ADHF, despite escalation of home diuretics. Now transferred to [facility] for refractory volume overload. His labs notable for stable Scr (though unclear baseline).  Repeat TTE shows severe BiV dysfunction.     Had a brief hypotensive episode 2/11 AM and received 500cc bolus. On exam, he is volume up and will need to continue to diurese him with bumex gtt. Palliative has followed 2/11 and at this time DNR/DNI w/ NIV and per wife - wants the ICD on.     Cardiac Studies  TTE 2/7/25 LVIDd 5.3cm, LVEF 30%, no LV thrombus, TAPSE 1.0cm, severely enlarged RV size/RVSF reduced, mod dilated LA, severe dilated RA, TAVR present, mod MR, severe TR, PASP 52, PASP 52, no pericardial effusion, IVC dilated 2.35cm  TTE 8/8/24; LVIDd 5.2, EF 25-30%, global hypokinesis, severe grade III DD, TASPE 1.1, TAVR present in AV position, mld-mod MR, mod TR, PASP 53

## 2025-02-12 LAB
ADD ON TEST-SPECIMEN IN LAB: SIGNIFICANT CHANGE UP
ALBUMIN SERPL ELPH-MCNC: 3.6 G/DL — SIGNIFICANT CHANGE UP (ref 3.3–5)
ALP SERPL-CCNC: 302 U/L — HIGH (ref 40–120)
ALT FLD-CCNC: 23 U/L — SIGNIFICANT CHANGE UP (ref 10–45)
ANION GAP SERPL CALC-SCNC: 14 MMOL/L — SIGNIFICANT CHANGE UP (ref 5–17)
ANION GAP SERPL CALC-SCNC: 14 MMOL/L — SIGNIFICANT CHANGE UP (ref 5–17)
APPEARANCE UR: CLEAR — SIGNIFICANT CHANGE UP
AST SERPL-CCNC: 51 U/L — HIGH (ref 10–40)
BILIRUB SERPL-MCNC: 0.7 MG/DL — SIGNIFICANT CHANGE UP (ref 0.2–1.2)
BILIRUB UR-MCNC: NEGATIVE — SIGNIFICANT CHANGE UP
BUN SERPL-MCNC: 68 MG/DL — HIGH (ref 7–23)
BUN SERPL-MCNC: 70 MG/DL — HIGH (ref 7–23)
CALCIUM SERPL-MCNC: 9.4 MG/DL — SIGNIFICANT CHANGE UP (ref 8.4–10.5)
CALCIUM SERPL-MCNC: 9.5 MG/DL — SIGNIFICANT CHANGE UP (ref 8.4–10.5)
CHLORIDE SERPL-SCNC: 94 MMOL/L — LOW (ref 96–108)
CHLORIDE SERPL-SCNC: 94 MMOL/L — LOW (ref 96–108)
CO2 SERPL-SCNC: 29 MMOL/L — SIGNIFICANT CHANGE UP (ref 22–31)
CO2 SERPL-SCNC: 31 MMOL/L — SIGNIFICANT CHANGE UP (ref 22–31)
COLOR SPEC: YELLOW — SIGNIFICANT CHANGE UP
CREAT SERPL-MCNC: 2.47 MG/DL — HIGH (ref 0.5–1.3)
CREAT SERPL-MCNC: 2.55 MG/DL — HIGH (ref 0.5–1.3)
DIFF PNL FLD: NEGATIVE — SIGNIFICANT CHANGE UP
EGFR: 25 ML/MIN/1.73M2 — LOW
EGFR: 25 ML/MIN/1.73M2 — LOW
EGFR: 26 ML/MIN/1.73M2 — LOW
EGFR: 26 ML/MIN/1.73M2 — LOW
GLUCOSE BLDC GLUCOMTR-MCNC: 137 MG/DL — HIGH (ref 70–99)
GLUCOSE BLDC GLUCOMTR-MCNC: 233 MG/DL — HIGH (ref 70–99)
GLUCOSE BLDC GLUCOMTR-MCNC: 233 MG/DL — HIGH (ref 70–99)
GLUCOSE BLDC GLUCOMTR-MCNC: 238 MG/DL — HIGH (ref 70–99)
GLUCOSE SERPL-MCNC: 153 MG/DL — HIGH (ref 70–99)
GLUCOSE SERPL-MCNC: 242 MG/DL — HIGH (ref 70–99)
GLUCOSE UR QL: NEGATIVE MG/DL — SIGNIFICANT CHANGE UP
HCT VFR BLD CALC: 33.7 % — LOW (ref 39–50)
HGB BLD-MCNC: 9.3 G/DL — LOW (ref 13–17)
KETONES UR-MCNC: NEGATIVE MG/DL — SIGNIFICANT CHANGE UP
LEUKOCYTE ESTERASE UR-ACNC: NEGATIVE — SIGNIFICANT CHANGE UP
MCHC RBC-ENTMCNC: 25.3 PG — LOW (ref 27–34)
MCHC RBC-ENTMCNC: 27.6 G/DL — LOW (ref 32–36)
MCV RBC AUTO: 91.6 FL — SIGNIFICANT CHANGE UP (ref 80–100)
NITRITE UR-MCNC: NEGATIVE — SIGNIFICANT CHANGE UP
NRBC BLD AUTO-RTO: 0 /100 WBCS — SIGNIFICANT CHANGE UP (ref 0–0)
PH UR: 7 — SIGNIFICANT CHANGE UP (ref 5–8)
PLATELET # BLD AUTO: 207 K/UL — SIGNIFICANT CHANGE UP (ref 150–400)
POTASSIUM SERPL-MCNC: 4.2 MMOL/L — SIGNIFICANT CHANGE UP (ref 3.5–5.3)
POTASSIUM SERPL-MCNC: 4.4 MMOL/L — SIGNIFICANT CHANGE UP (ref 3.5–5.3)
POTASSIUM SERPL-SCNC: 4.2 MMOL/L — SIGNIFICANT CHANGE UP (ref 3.5–5.3)
POTASSIUM SERPL-SCNC: 4.4 MMOL/L — SIGNIFICANT CHANGE UP (ref 3.5–5.3)
PROCALCITONIN SERPL-MCNC: 0.15 NG/ML — HIGH (ref 0.02–0.1)
PROT SERPL-MCNC: 7.4 G/DL — SIGNIFICANT CHANGE UP (ref 6–8.3)
PROT UR-MCNC: NEGATIVE MG/DL — SIGNIFICANT CHANGE UP
RBC # BLD: 3.68 M/UL — LOW (ref 4.2–5.8)
RBC # FLD: 24.1 % — HIGH (ref 10.3–14.5)
SODIUM SERPL-SCNC: 137 MMOL/L — SIGNIFICANT CHANGE UP (ref 135–145)
SODIUM SERPL-SCNC: 139 MMOL/L — SIGNIFICANT CHANGE UP (ref 135–145)
SP GR SPEC: 1.01 — SIGNIFICANT CHANGE UP (ref 1–1.03)
UROBILINOGEN FLD QL: 1 MG/DL — SIGNIFICANT CHANGE UP (ref 0.2–1)
WBC # BLD: 12.1 K/UL — HIGH (ref 3.8–10.5)
WBC # FLD AUTO: 12.1 K/UL — HIGH (ref 3.8–10.5)

## 2025-02-12 PROCEDURE — 99232 SBSQ HOSP IP/OBS MODERATE 35: CPT

## 2025-02-12 RX ORDER — ACETAZOLAMIDE 250 MG/1
500 TABLET ORAL ONCE
Refills: 0 | Status: COMPLETED | OUTPATIENT
Start: 2025-02-12 | End: 2025-02-12

## 2025-02-12 RX ORDER — ACETAZOLAMIDE 250 MG/1
500 TABLET ORAL ONCE
Refills: 0 | Status: DISCONTINUED | OUTPATIENT
Start: 2025-02-12 | End: 2025-02-12

## 2025-02-12 RX ADMIN — Medication 1 DOSE(S): at 17:57

## 2025-02-12 RX ADMIN — Medication 81 MILLIGRAM(S): at 11:00

## 2025-02-12 RX ADMIN — INSULIN LISPRO 4: 100 INJECTION, SOLUTION INTRAVENOUS; SUBCUTANEOUS at 13:27

## 2025-02-12 RX ADMIN — ISOSORBDIE DINITRATE 10 MILLIGRAM(S): 30 TABLET ORAL at 10:00

## 2025-02-12 RX ADMIN — RIVAROXABAN 15 MILLIGRAM(S): 10 TABLET, FILM COATED ORAL at 17:58

## 2025-02-12 RX ADMIN — INSULIN LISPRO 4: 100 INJECTION, SOLUTION INTRAVENOUS; SUBCUTANEOUS at 17:58

## 2025-02-12 RX ADMIN — ISOSORBDIE DINITRATE 10 MILLIGRAM(S): 30 TABLET ORAL at 15:04

## 2025-02-12 RX ADMIN — INSULIN LISPRO 3 UNIT(S): 100 INJECTION, SOLUTION INTRAVENOUS; SUBCUTANEOUS at 13:26

## 2025-02-12 RX ADMIN — POLYETHYLENE GLYCOL 3350 17 GRAM(S): 17 POWDER, FOR SOLUTION ORAL at 05:46

## 2025-02-12 RX ADMIN — Medication 1 DOSE(S): at 05:47

## 2025-02-12 RX ADMIN — Medication 1 SPRAY(S): at 17:57

## 2025-02-12 RX ADMIN — ACETAZOLAMIDE 500 MILLIGRAM(S): 250 TABLET ORAL at 18:59

## 2025-02-12 RX ADMIN — TAMSULOSIN HYDROCHLORIDE 0.4 MILLIGRAM(S): 0.4 CAPSULE ORAL at 21:54

## 2025-02-12 RX ADMIN — Medication 10 MILLIGRAM(S): at 21:54

## 2025-02-12 RX ADMIN — Medication 10 MILLIGRAM(S): at 15:04

## 2025-02-12 RX ADMIN — BUMETANIDE 5 MG/HR: 1 TABLET ORAL at 11:01

## 2025-02-12 RX ADMIN — POLYETHYLENE GLYCOL 3350 17 GRAM(S): 17 POWDER, FOR SOLUTION ORAL at 17:57

## 2025-02-12 RX ADMIN — ATORVASTATIN CALCIUM 20 MILLIGRAM(S): 80 TABLET, FILM COATED ORAL at 21:54

## 2025-02-12 RX ADMIN — TIOTROPIUM BROMIDE INHALATION SPRAY 2 PUFF(S): 3.12 SPRAY, METERED RESPIRATORY (INHALATION) at 11:01

## 2025-02-12 RX ADMIN — IPRATROPIUM BROMIDE AND ALBUTEROL SULFATE 3 MILLILITER(S): .5; 2.5 SOLUTION RESPIRATORY (INHALATION) at 13:06

## 2025-02-12 RX ADMIN — Medication 10 MILLIGRAM(S): at 05:51

## 2025-02-12 RX ADMIN — Medication 1 SPRAY(S): at 05:46

## 2025-02-12 RX ADMIN — ISOSORBDIE DINITRATE 10 MILLIGRAM(S): 30 TABLET ORAL at 17:59

## 2025-02-12 RX ADMIN — INSULIN LISPRO 3 UNIT(S): 100 INJECTION, SOLUTION INTRAVENOUS; SUBCUTANEOUS at 17:58

## 2025-02-12 RX ADMIN — METOPROLOL SUCCINATE 25 MILLIGRAM(S): 50 TABLET, EXTENDED RELEASE ORAL at 05:51

## 2025-02-12 RX ADMIN — INSULIN LISPRO 3 UNIT(S): 100 INJECTION, SOLUTION INTRAVENOUS; SUBCUTANEOUS at 08:54

## 2025-02-12 RX ADMIN — INSULIN GLARGINE-YFGN 32 UNIT(S): 100 INJECTION, SOLUTION SUBCUTANEOUS at 21:54

## 2025-02-12 NOTE — PROGRESS NOTE ADULT - SUBJECTIVE AND OBJECTIVE BOX
Subjective:  -received lokelma x1 for K5.5  -laying in bed, denies any CP/palpitation, SOB at rest, endorsing BLE edema    Medications:  acetaminophen     Tablet .. 650 milliGRAM(s) Oral every 6 hours PRN  acetaZOLAMIDE  IVPB 500 milliGRAM(s) IV Intermittent once  albuterol/ipratropium for Nebulization 3 milliLiter(s) Nebulizer every 6 hours PRN  aspirin enteric coated 81 milliGRAM(s) Oral daily  atorvastatin 20 milliGRAM(s) Oral at bedtime  buMETAnide Infusion 1 mG/Hr IV Continuous <Continuous>  dextrose 5%. 1000 milliLiter(s) IV Continuous <Continuous>  dextrose 5%. 1000 milliLiter(s) IV Continuous <Continuous>  dextrose 50% Injectable 25 Gram(s) IV Push once  dextrose 50% Injectable 12.5 Gram(s) IV Push once  dextrose 50% Injectable 25 Gram(s) IV Push once  dextrose Oral Gel 15 Gram(s) Oral once PRN  fluticasone propionate/ salmeterol 250-50 MICROgram(s) Diskus 1 Dose(s) Inhalation two times a day  glucagon  Injectable 1 milliGRAM(s) IntraMuscular once  hydrALAZINE 10 milliGRAM(s) Oral three times a day  insulin glargine Injectable (LANTUS) 32 Unit(s) SubCutaneous at bedtime  insulin lispro (ADMELOG) corrective regimen sliding scale   SubCutaneous three times a day before meals  insulin lispro Injectable (ADMELOG) 3 Unit(s) SubCutaneous three times a day before meals  isosorbide   dinitrate Tablet (ISORDIL) 10 milliGRAM(s) Oral three times a day  melatonin 3 milliGRAM(s) Oral at bedtime PRN  metoprolol succinate ER 25 milliGRAM(s) Oral daily  polyethylene glycol 3350 17 Gram(s) Oral two times a day  rivaroxaban 15 milliGRAM(s) Oral with dinner  sodium chloride 0.65% Nasal 1 Spray(s) Both Nostrils two times a day  tamsulosin 0.4 milliGRAM(s) Oral at bedtime  tiotropium 2.5 MICROgram(s) Inhaler 2 Puff(s) Inhalation daily      Physical Exam:    Vitals:  Vital Signs Last 24 Hours  T(C): 36.3 (25 @ 11:41), Max: 37.5 (25 @ 00:10)  HR: 70 (25 @ 11:41) (66 - 80)  BP: 113/69 (25 @ 11:41) (111/57 - 134/66)  RR: 18 (25 @ 11:41) (18 - 18)  SpO2: 99% (25 @ 11:41) (94% - 99%)    Weight in k ( @ 04:12)    I&O's Summary    2025 07:01  -  2025 07:00  --------------------------------------------------------  IN: 600 mL / OUT: 700 mL / NET: -100 mL    2025 07:01  -  2025 13:38  --------------------------------------------------------  IN: 180 mL / OUT: 700 mL / NET: -520 mL        Tele: Paced 70s    General: No distress. Comfortable.  HEENT: EOM intact.  Neck: Neck supple. JVP elevated to mandibles  Chest: Clear to auscultation bilaterally  CV: Normal S1 and S2. No murmurs, rub, or gallops. Radial pulses normal.  Abdomen: Soft, non-distended, non-tender  Extremities: +2 BLE edema up to knee  Skin: No rashes or skin breakdown  Neurology: Alert and oriented times three. Sensation intact  Psych: Affect normal    Labs:                        9.3    12.10 )-----------( 207      ( 2025 06:59 )             33.7     0212    139  |  94[L]  |  70[H]  ----------------------------<  153[H]  4.4   |  31  |  2.55[H]    Ca    9.5      2025 06:59  Mg     2.2     02-11    TPro  7.4  /  Alb  3.6  /  TBili  0.7  /  DBili  x   /  AST  51[H]  /  ALT  23  /  AlkPhos  302[H]

## 2025-02-12 NOTE — PROGRESS NOTE ADULT - SUBJECTIVE AND OBJECTIVE BOX
[facility] NEPHROLOGY SERVICES, [facility]  NEPHROLOGY AND HYPERTENSION  [address]  [address]  [place] [address]  [phone]    MD DANIEL GARRETT MD YELENA ROSENBERG, MD BINNY KOSHY, MD CHRISTOPHER CAPUTO, MD EDWARD BOVER, MD          Patient events noted  no distress  Bumex drip    MEDICATIONS  (STANDING):  aspirin enteric coated 81 milliGRAM(s) Oral daily  atorvastatin 20 milliGRAM(s) Oral at bedtime  buMETAnide Infusion 1 mG/Hr (5 mL/Hr) IV Continuous <Continuous>  dextrose 5%. 1000 milliLiter(s) (50 mL/Hr) IV Continuous <Continuous>  dextrose 5%. 1000 milliLiter(s) (100 mL/Hr) IV Continuous <Continuous>  dextrose 50% Injectable 25 Gram(s) IV Push once  dextrose 50% Injectable 12.5 Gram(s) IV Push once  dextrose 50% Injectable 25 Gram(s) IV Push once  fluticasone propionate/ salmeterol 250-50 MICROgram(s) Diskus 1 Dose(s) Inhalation two times a day  glucagon  Injectable 1 milliGRAM(s) IntraMuscular once  hydrALAZINE 10 milliGRAM(s) Oral three times a day  insulin glargine Injectable (LANTUS) 32 Unit(s) SubCutaneous at bedtime  insulin lispro (ADMELOG) corrective regimen sliding scale   SubCutaneous three times a day before meals  insulin lispro Injectable (ADMELOG) 3 Unit(s) SubCutaneous three times a day before meals  isosorbide   dinitrate Tablet (ISORDIL) 10 milliGRAM(s) Oral three times a day  metoprolol succinate ER 25 milliGRAM(s) Oral daily  polyethylene glycol 3350 17 Gram(s) Oral two times a day  rivaroxaban 15 milliGRAM(s) Oral with dinner  sodium chloride 0.65% Nasal 1 Spray(s) Both Nostrils two times a day  tamsulosin 0.4 milliGRAM(s) Oral at bedtime  tiotropium 2.5 MICROgram(s) Inhaler 2 Puff(s) Inhalation daily    MEDICATIONS  (PRN):  acetaminophen     Tablet .. 650 milliGRAM(s) Oral every 6 hours PRN Temp greater or equal to 38C (100.4F), Mild Pain (1 - 3)  albuterol/ipratropium for Nebulization 3 milliLiter(s) Nebulizer every 6 hours PRN Shortness of Breath and/or Wheezing  dextrose Oral Gel 15 Gram(s) Oral once PRN Blood Glucose LESS THAN 70 milliGRAM(s)/deciliter  melatonin 3 milliGRAM(s) Oral at bedtime PRN Insomnia      02-11-25 @ 07:01  -  02-12-25 @ 07:00  --------------------------------------------------------  IN: 600 mL / OUT: 700 mL / NET: -100 mL    02-12-25 @ 07:01  -  02-12-25 @ 21:41  --------------------------------------------------------  IN: 360 mL / OUT: 700 mL / NET: -340 mL      PHYSICAL EXAM:      T(C): 36.9 (02-12-25 @ 20:45), Max: 37.5 (02-12-25 @ 00:10)  HR: 70 (02-12-25 @ 20:45) (66 - 80)  BP: 115/65 (02-12-25 @ 20:45) (113/69 - 134/66)  RR: 18 (02-12-25 @ 20:45) (18 - 18)  SpO2: 98% (02-12-25 @ 20:45) (94% - 99%)  Wt(kg): --  Lungs clear  Heart S1S2  Abd soft NT ND  Extremities:   2 edema                                    9.3    12.10 )-----------( 207      ( 12 Feb 2025 06:59 )             33.7     02-12    137  |  94[L]  |  68[H]  ----------------------------<  242[H]  4.2   |  29  |  2.47[H]    Ca    9.4      12 Feb 2025 19:28  Mg     2.2     02-11    TPro  7.4  /  Alb  3.6  /  TBili  0.7  /  DBili  x   /  AST  51[H]  /  ALT  23  /  AlkPhos  302[H]  02-12      LIVER FUNCTIONS - ( 12 Feb 2025 06:59 )  Alb: 3.6 g/dL / Pro: 7.4 g/dL / ALK PHOS: 302 U/L / ALT: 23 U/L / AST: 51 U/L / GGT: x           Creatinine Trend: 2.47<--, 2.55<--, 2.65<--, 2.15<--, 2.33<--, 2.24<--      Assessments    CM, EF ~ 30%, severe TR  Adm to [facility] VS 1/10 w/fluid overload   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 - 2.5)    Plan   Continue diuretic regimen Bumex drip 1mg/ hr  F/u BMP, Mg, UO  Avoid nephrotoxins as able      Jon Rowe MD

## 2025-02-12 NOTE — PROGRESS NOTE ADULT - PROBLEM SELECTOR PLAN 1
: Etiology likely ischemic. Primary cardiologist Dr. Jewel Stubbs ([facility]), last TTE LVEF 25-30%. Appears has been on some GDMT as outpt (?Farxiga, Coreg and previously on ARNI though unclear why stopped)  - On Toprol XL 25mg QD  - Will defer ARB/ARNI/SGLT2I/MRA for now given degree of renal dysfunction.  - Afterload: c/w ISDN 10mg TID and HDZN 10mg TID with hold parameter <95 SBP. Had brief episode of hypotension 2/11 AM  - Diuretics: c/w bumex gtt 1mg/hr. Add diamox 500mg QD x 1 today  - Has ICD in place; Currently a DNR/DNI: trial NIV. Discussed with palliative and at this time wants ICD on as per wife  - Trend twice daily BMP, LFTs, while on continuous loop diuretics  - Aggressive PT  - Daily Standing weight with PT   - Strict I/O's  - Target electrolyte repletion to target K+ >4.0 and Mg >2.0.  - Please do ACE wrap for BLE

## 2025-02-12 NOTE — PROGRESS NOTE ADULT - ASSESSMENT
80 y/o male w/ PMHx CAD s/p CABG, HF (combined HFrEF [EF 25%] and HFpEF [G3DD]) w/ ICD, AFib on Xarelto, severe AS s/p TAVR, COPD on 3-4L home O2, CKD4, HTN, HLD, T2DM, and BPH who presents as a transfer from [facility] for HF evaluation. Ongoing CHF exacerbation on bumex gtt.

## 2025-02-12 NOTE — PROGRESS NOTE ADULT - PROBLEM SELECTOR PLAN 1
CHOOSE ONE: HFpEF + HFrEF  EF 30% with severe tricuspid regurg and PASP 55mmHG on 2/7/24  Heart failure, CHF order set initiated    Guideline directed medical therapy as below: (Name/Dose/Frequency)  - Diuretic:  bumex gtt 1mg/hr, Diamox back on  - BB:  Metoprolol succinate 50mg day  - ARNI/ACE-I/ARB: Ramipril 2.5mg daily - hold given DENISHA on CKD  - Hydralazine/Nitrate: Add hydral 10mg TID. Isodril 10mg TID   - MRA: Was on spironolactone last year, will reintroduce pending denisha  - SGLT2: none

## 2025-02-12 NOTE — PROGRESS NOTE ADULT - ASSESSMENT
78 y/o male w/ PMHx CAD s/p CABG, HFrEF (LVIDd 5.2, LVEF 25-30%) w/ DC- ICD, AFib (Xarelto), severe AS s/p TAVR, COPD on 3-4L home O2, CKD3, HTN, HLD, T2DM, and BPH presented to [facility] for ADHF, despite escalation of home diuretics. Now transferred to [facility] for refractory volume overload. His labs notable for stable Scr (though unclear baseline).  Repeat TTE shows severe BiV dysfunction.     Had a brief hypotensive episode 2/11 AM and received 500cc bolus. On exam, he remains volume up and will need to continue to diurese him with bumex gtt and will add diamox today. Palliative has followed 2/11 and at this time DNR/DNI w/ NIV and per wife - wants the ICD on.     Cardiac Studies  TTE 2/7/25 LVIDd 5.3cm, LVEF 30%, no LV thrombus, TAPSE 1.0cm, severely enlarged RV size/RVSF reduced, mod dilated LA, severe dilated RA, TAVR present, mod MR, severe TR, PASP 52, PASP 52, no pericardial effusion, IVC dilated 2.35cm  TTE 8/8/24; LVIDd 5.2, EF 25-30%, global hypokinesis, severe grade III DD, TASPE 1.1, TAVR present in AV position, mld-mod MR, mod TR, PASP 53

## 2025-02-12 NOTE — PROGRESS NOTE ADULT - SUBJECTIVE AND OBJECTIVE BOX
Contreras Brooks MD  Division of Hospital Medicine  Available on MS teams until 7pm  If no response or off-hours, page [phone]  -------------------------------------    Patient is a 79y old  Male who presents with a chief complaint of HF eval (12 Feb 2025 13:36)      SUBJECTIVE / OVERNIGHT EVENTS: none acute  ADDITIONAL REVIEW OF SYSTEMS: pt feels ok, no fevers/chills, no cough, no dysuria/frequency. no new complaints. wishes to get OOB    MEDICATIONS  (STANDING):  acetaZOLAMIDE  IVPB 500 milliGRAM(s) IV Intermittent once  aspirin enteric coated 81 milliGRAM(s) Oral daily  atorvastatin 20 milliGRAM(s) Oral at bedtime  buMETAnide Infusion 1 mG/Hr (5 mL/Hr) IV Continuous <Continuous>  dextrose 5%. 1000 milliLiter(s) (100 mL/Hr) IV Continuous <Continuous>  dextrose 5%. 1000 milliLiter(s) (50 mL/Hr) IV Continuous <Continuous>  dextrose 50% Injectable 25 Gram(s) IV Push once  dextrose 50% Injectable 12.5 Gram(s) IV Push once  dextrose 50% Injectable 25 Gram(s) IV Push once  fluticasone propionate/ salmeterol 250-50 MICROgram(s) Diskus 1 Dose(s) Inhalation two times a day  glucagon  Injectable 1 milliGRAM(s) IntraMuscular once  hydrALAZINE 10 milliGRAM(s) Oral three times a day  insulin glargine Injectable (LANTUS) 32 Unit(s) SubCutaneous at bedtime  insulin lispro (ADMELOG) corrective regimen sliding scale   SubCutaneous three times a day before meals  insulin lispro Injectable (ADMELOG) 3 Unit(s) SubCutaneous three times a day before meals  isosorbide   dinitrate Tablet (ISORDIL) 10 milliGRAM(s) Oral three times a day  metoprolol succinate ER 25 milliGRAM(s) Oral daily  polyethylene glycol 3350 17 Gram(s) Oral two times a day  rivaroxaban 15 milliGRAM(s) Oral with dinner  sodium chloride 0.65% Nasal 1 Spray(s) Both Nostrils two times a day  tamsulosin 0.4 milliGRAM(s) Oral at bedtime  tiotropium 2.5 MICROgram(s) Inhaler 2 Puff(s) Inhalation daily    MEDICATIONS  (PRN):  acetaminophen     Tablet .. 650 milliGRAM(s) Oral every 6 hours PRN Temp greater or equal to 38C (100.4F), Mild Pain (1 - 3)  albuterol/ipratropium for Nebulization 3 milliLiter(s) Nebulizer every 6 hours PRN Shortness of Breath and/or Wheezing  dextrose Oral Gel 15 Gram(s) Oral once PRN Blood Glucose LESS THAN 70 milliGRAM(s)/deciliter  melatonin 3 milliGRAM(s) Oral at bedtime PRN Insomnia      CAPILLARY BLOOD GLUCOSE      POCT Blood Glucose.: 233 mg/dL (12 Feb 2025 12:46)  POCT Blood Glucose.: 137 mg/dL (12 Feb 2025 08:33)  POCT Blood Glucose.: 222 mg/dL (11 Feb 2025 21:21)  POCT Blood Glucose.: 213 mg/dL (11 Feb 2025 17:39)    I&O's Summary    11 Feb 2025 07:01  -  12 Feb 2025 07:00  --------------------------------------------------------  IN: 600 mL / OUT: 700 mL / NET: -100 mL    12 Feb 2025 07:01  -  12 Feb 2025 14:55  --------------------------------------------------------  IN: 360 mL / OUT: 700 mL / NET: -340 mL        PHYSICAL EXAM:  Vital Signs Last 24 Hrs  T(C): 36.3 (12 Feb 2025 11:41), Max: 37.5 (12 Feb 2025 00:10)  T(F): 97.3 (12 Feb 2025 11:41), Max: 99.5 (12 Feb 2025 00:10)  HR: 70 (12 Feb 2025 11:41) (66 - 80)  BP: 113/69 (12 Feb 2025 11:41) (111/57 - 134/66)  BP(mean): --  RR: 18 (12 Feb 2025 11:41) (18 - 18)  SpO2: 99% (12 Feb 2025 11:41) (94% - 99%)    Parameters below as of 12 Feb 2025 11:41  Patient On (Oxygen Delivery Method): nasal cannula      CONSTITUTIONAL: NAD  EYES: PERRLA; conjunctiva and sclera clear  ENMT: MMM  NECK: Supple  RESPIRATORY: Normal respiratory effort; CTAB  CARDIOVASCULAR: RRR, no JVD, +2 pitting edema  ABDOMEN: Nontender to palpation, normoactive BS, no guarding/rigidity  MUSCLOSKELETAL:  no clubbing/cyanosis, no joint swelling or tenderness to palpation  PSYCH: A+O x 2-3, affect normal  NEUROLOGY: CN 2-12 are intact and symmetric; no gross sensory or motor deficits  SKIN: No rashes; no palpable lesions    LABS:                        9.3    12.10 )-----------( 207      ( 12 Feb 2025 06:59 )             33.7     02-12    139  |  94[L]  |  70[H]  ----------------------------<  153[H]  4.4   |  31  |  2.55[H]    Ca    9.5      12 Feb 2025 06:59  Mg     2.2     02-11    TPro  7.4  /  Alb  3.6  /  TBili  0.7  /  DBili  x   /  AST  51[H]  /  ALT  23  /  AlkPhos  302[H]  02-12          Urinalysis Basic - ( 12 Feb 2025 06:59 )    Color: x / Appearance: x / SG: x / pH: x  Gluc: 153 mg/dL / Ketone: x  / Bili: x / Urobili: x   Blood: x / Protein: x / Nitrite: x   Leuk Esterase: x / RBC: x / WBC x   Sq Epi: x / Non Sq Epi: x / Bacteria: x          RADIOLOGY & ADDITIONAL TESTS:  Results Reviewed:   Imaging Personally Reviewed:  Electrocardiogram Personally Reviewed:    COORDINATION OF CARE:  Care Discussed with Consultants/Other Providers [Y/N]:   Prior or Outpatient Records Reviewed [Y/N]:

## 2025-02-13 DIAGNOSIS — I48.11 LONGSTANDING PERSISTENT ATRIAL FIBRILLATION: ICD-10-CM

## 2025-02-13 DIAGNOSIS — I48.20 CHRONIC ATRIAL FIBRILLATION, UNSPECIFIED: ICD-10-CM

## 2025-02-13 DIAGNOSIS — I27.0 PRIMARY PULMONARY HYPERTENSION: ICD-10-CM

## 2025-02-13 LAB
ALBUMIN SERPL ELPH-MCNC: 3.5 G/DL — SIGNIFICANT CHANGE UP (ref 3.3–5)
ALP SERPL-CCNC: 292 U/L — HIGH (ref 40–120)
ALT FLD-CCNC: 23 U/L — SIGNIFICANT CHANGE UP (ref 10–45)
ANION GAP SERPL CALC-SCNC: 15 MMOL/L — SIGNIFICANT CHANGE UP (ref 5–17)
ANION GAP SERPL CALC-SCNC: 17 MMOL/L — SIGNIFICANT CHANGE UP (ref 5–17)
AST SERPL-CCNC: 48 U/L — HIGH (ref 10–40)
BILIRUB SERPL-MCNC: 0.7 MG/DL — SIGNIFICANT CHANGE UP (ref 0.2–1.2)
BUN SERPL-MCNC: 70 MG/DL — HIGH (ref 7–23)
BUN SERPL-MCNC: 71 MG/DL — HIGH (ref 7–23)
CALCIUM SERPL-MCNC: 9.2 MG/DL — SIGNIFICANT CHANGE UP (ref 8.4–10.5)
CALCIUM SERPL-MCNC: 9.5 MG/DL — SIGNIFICANT CHANGE UP (ref 8.4–10.5)
CHLORIDE SERPL-SCNC: 91 MMOL/L — LOW (ref 96–108)
CHLORIDE SERPL-SCNC: 93 MMOL/L — LOW (ref 96–108)
CO2 SERPL-SCNC: 28 MMOL/L — SIGNIFICANT CHANGE UP (ref 22–31)
CO2 SERPL-SCNC: 29 MMOL/L — SIGNIFICANT CHANGE UP (ref 22–31)
CREAT SERPL-MCNC: 2.52 MG/DL — HIGH (ref 0.5–1.3)
CREAT SERPL-MCNC: 2.59 MG/DL — HIGH (ref 0.5–1.3)
EGFR: 24 ML/MIN/1.73M2 — LOW
EGFR: 24 ML/MIN/1.73M2 — LOW
EGFR: 25 ML/MIN/1.73M2 — LOW
EGFR: 25 ML/MIN/1.73M2 — LOW
GLUCOSE BLDC GLUCOMTR-MCNC: 177 MG/DL — HIGH (ref 70–99)
GLUCOSE BLDC GLUCOMTR-MCNC: 225 MG/DL — HIGH (ref 70–99)
GLUCOSE BLDC GLUCOMTR-MCNC: 263 MG/DL — HIGH (ref 70–99)
GLUCOSE BLDC GLUCOMTR-MCNC: 275 MG/DL — HIGH (ref 70–99)
GLUCOSE SERPL-MCNC: 185 MG/DL — HIGH (ref 70–99)
GLUCOSE SERPL-MCNC: 314 MG/DL — HIGH (ref 70–99)
HCT VFR BLD CALC: 32.9 % — LOW (ref 39–50)
HGB BLD-MCNC: 9.2 G/DL — LOW (ref 13–17)
LACTATE SERPL-SCNC: 1.2 MMOL/L — SIGNIFICANT CHANGE UP (ref 0.5–2)
MAGNESIUM SERPL-MCNC: 2.4 MG/DL — SIGNIFICANT CHANGE UP (ref 1.6–2.6)
MCHC RBC-ENTMCNC: 25.6 PG — LOW (ref 27–34)
MCHC RBC-ENTMCNC: 28 G/DL — LOW (ref 32–36)
MCV RBC AUTO: 91.6 FL — SIGNIFICANT CHANGE UP (ref 80–100)
NRBC BLD AUTO-RTO: 0 /100 WBCS — SIGNIFICANT CHANGE UP (ref 0–0)
PHOSPHATE SERPL-MCNC: 4.5 MG/DL — SIGNIFICANT CHANGE UP (ref 2.5–4.5)
PLATELET # BLD AUTO: 196 K/UL — SIGNIFICANT CHANGE UP (ref 150–400)
POTASSIUM SERPL-MCNC: 4.1 MMOL/L — SIGNIFICANT CHANGE UP (ref 3.5–5.3)
POTASSIUM SERPL-MCNC: 4.5 MMOL/L — SIGNIFICANT CHANGE UP (ref 3.5–5.3)
POTASSIUM SERPL-SCNC: 4.1 MMOL/L — SIGNIFICANT CHANGE UP (ref 3.5–5.3)
POTASSIUM SERPL-SCNC: 4.5 MMOL/L — SIGNIFICANT CHANGE UP (ref 3.5–5.3)
PROT SERPL-MCNC: 7.5 G/DL — SIGNIFICANT CHANGE UP (ref 6–8.3)
RBC # BLD: 3.59 M/UL — LOW (ref 4.2–5.8)
RBC # FLD: 24.1 % — HIGH (ref 10.3–14.5)
SODIUM SERPL-SCNC: 135 MMOL/L — SIGNIFICANT CHANGE UP (ref 135–145)
SODIUM SERPL-SCNC: 138 MMOL/L — SIGNIFICANT CHANGE UP (ref 135–145)
WBC # BLD: 10.89 K/UL — HIGH (ref 3.8–10.5)
WBC # FLD AUTO: 10.89 K/UL — HIGH (ref 3.8–10.5)

## 2025-02-13 PROCEDURE — 99233 SBSQ HOSP IP/OBS HIGH 50: CPT

## 2025-02-13 PROCEDURE — 99232 SBSQ HOSP IP/OBS MODERATE 35: CPT

## 2025-02-13 RX ORDER — ACETAZOLAMIDE 250 MG/1
500 TABLET ORAL ONCE
Refills: 0 | Status: COMPLETED | OUTPATIENT
Start: 2025-02-13 | End: 2025-02-13

## 2025-02-13 RX ADMIN — INSULIN LISPRO 2: 100 INJECTION, SOLUTION INTRAVENOUS; SUBCUTANEOUS at 08:58

## 2025-02-13 RX ADMIN — ACETAZOLAMIDE 500 MILLIGRAM(S): 250 TABLET ORAL at 20:13

## 2025-02-13 RX ADMIN — ISOSORBDIE DINITRATE 10 MILLIGRAM(S): 30 TABLET ORAL at 17:27

## 2025-02-13 RX ADMIN — Medication 1 SPRAY(S): at 05:16

## 2025-02-13 RX ADMIN — INSULIN LISPRO 3 UNIT(S): 100 INJECTION, SOLUTION INTRAVENOUS; SUBCUTANEOUS at 08:59

## 2025-02-13 RX ADMIN — POLYETHYLENE GLYCOL 3350 17 GRAM(S): 17 POWDER, FOR SOLUTION ORAL at 05:24

## 2025-02-13 RX ADMIN — Medication 1 DOSE(S): at 05:16

## 2025-02-13 RX ADMIN — ISOSORBDIE DINITRATE 10 MILLIGRAM(S): 30 TABLET ORAL at 08:59

## 2025-02-13 RX ADMIN — Medication 1 SPRAY(S): at 17:27

## 2025-02-13 RX ADMIN — Medication 10 MILLIGRAM(S): at 05:23

## 2025-02-13 RX ADMIN — INSULIN GLARGINE-YFGN 32 UNIT(S): 100 INJECTION, SOLUTION SUBCUTANEOUS at 22:11

## 2025-02-13 RX ADMIN — Medication 10 MILLIGRAM(S): at 17:28

## 2025-02-13 RX ADMIN — POLYETHYLENE GLYCOL 3350 17 GRAM(S): 17 POWDER, FOR SOLUTION ORAL at 17:26

## 2025-02-13 RX ADMIN — Medication 10 MILLIGRAM(S): at 22:11

## 2025-02-13 RX ADMIN — TAMSULOSIN HYDROCHLORIDE 0.4 MILLIGRAM(S): 0.4 CAPSULE ORAL at 22:11

## 2025-02-13 RX ADMIN — METOPROLOL SUCCINATE 25 MILLIGRAM(S): 50 TABLET, EXTENDED RELEASE ORAL at 05:17

## 2025-02-13 RX ADMIN — TIOTROPIUM BROMIDE INHALATION SPRAY 2 PUFF(S): 3.12 SPRAY, METERED RESPIRATORY (INHALATION) at 11:08

## 2025-02-13 RX ADMIN — ATORVASTATIN CALCIUM 20 MILLIGRAM(S): 80 TABLET, FILM COATED ORAL at 22:11

## 2025-02-13 RX ADMIN — RIVAROXABAN 15 MILLIGRAM(S): 10 TABLET, FILM COATED ORAL at 17:26

## 2025-02-13 RX ADMIN — BUMETANIDE 5 MG/HR: 1 TABLET ORAL at 20:17

## 2025-02-13 RX ADMIN — INSULIN LISPRO 4: 100 INJECTION, SOLUTION INTRAVENOUS; SUBCUTANEOUS at 13:05

## 2025-02-13 RX ADMIN — BUMETANIDE 5 MG/HR: 1 TABLET ORAL at 08:58

## 2025-02-13 RX ADMIN — INSULIN LISPRO 3 UNIT(S): 100 INJECTION, SOLUTION INTRAVENOUS; SUBCUTANEOUS at 21:03

## 2025-02-13 RX ADMIN — Medication 81 MILLIGRAM(S): at 11:08

## 2025-02-13 RX ADMIN — BUMETANIDE 5 MG/HR: 1 TABLET ORAL at 20:15

## 2025-02-13 RX ADMIN — INSULIN LISPRO 6: 100 INJECTION, SOLUTION INTRAVENOUS; SUBCUTANEOUS at 17:30

## 2025-02-13 RX ADMIN — Medication 1 DOSE(S): at 17:27

## 2025-02-13 RX ADMIN — INSULIN LISPRO 3 UNIT(S): 100 INJECTION, SOLUTION INTRAVENOUS; SUBCUTANEOUS at 13:05

## 2025-02-13 NOTE — PROGRESS NOTE ADULT - SUBJECTIVE AND OBJECTIVE BOX
No distress, on NC O2    Vital Signs Last 24 Hrs  T(C): 36.4 (02-13-25 @ 12:25), Max: 36.9 (02-12-25 @ 20:45)  T(F): 97.6 (02-13-25 @ 12:25), Max: 98.4 (02-12-25 @ 20:45)  HR: 72 (02-13-25 @ 12:25) (69 - 74)  BP: 96/55 (02-13-25 @ 12:25) (96/55 - 122/76)  RR: 18 (02-13-25 @ 12:25) (18 - 18)  SpO2: 93% (02-13-25 @ 12:25) (93% - 98%)    I&O's Detail    12 Feb 2025 07:01  -  13 Feb 2025 07:00  --------------------------------------------------------  IN:    Oral Fluid: 960 mL  Total IN: 960 mL    OUT:    Voided (mL): 1500 mL  Total OUT: 1500 mL    s1s2  b/l air entry  soft, ND  edema                                          9.2    10.89 )-----------( 196      ( 13 Feb 2025 06:39 )             32.9     13 Feb 2025 06:39    138    |  93     |  70     ----------------------------<  185    4.5     |  28     |  2.52     Ca    9.5        13 Feb 2025 06:39  Phos  4.5       13 Feb 2025 06:39  Mg     2.4       13 Feb 2025 06:39    TPro  7.5    /  Alb  3.5    /  TBili  0.7    /  DBili  x      /  AST  48     /  ALT  23     /  AlkPhos  292    13 Feb 2025 06:39    LIVER FUNCTIONS - ( 13 Feb 2025 06:39 )  Alb: 3.5 g/dL / Pro: 7.5 g/dL / ALK PHOS: 292 U/L / ALT: 23 U/L / AST: 48 U/L / GGT: x           acetaminophen     Tablet .. 650 milliGRAM(s) Oral every 6 hours PRN  acetaZOLAMIDE Injectable 500 milliGRAM(s) IV Push once  albuterol/ipratropium for Nebulization 3 milliLiter(s) Nebulizer every 6 hours PRN  aspirin enteric coated 81 milliGRAM(s) Oral daily  atorvastatin 20 milliGRAM(s) Oral at bedtime  buMETAnide Infusion 1 mG/Hr IV Continuous <Continuous>  dextrose 5%. 1000 milliLiter(s) IV Continuous <Continuous>  dextrose 5%. 1000 milliLiter(s) IV Continuous <Continuous>  dextrose 50% Injectable 25 Gram(s) IV Push once  dextrose 50% Injectable 12.5 Gram(s) IV Push once  dextrose 50% Injectable 25 Gram(s) IV Push once  dextrose Oral Gel 15 Gram(s) Oral once PRN  fluticasone propionate/ salmeterol 250-50 MICROgram(s) Diskus 1 Dose(s) Inhalation two times a day  glucagon  Injectable 1 milliGRAM(s) IntraMuscular once  hydrALAZINE 10 milliGRAM(s) Oral three times a day  insulin glargine Injectable (LANTUS) 32 Unit(s) SubCutaneous at bedtime  insulin lispro (ADMELOG) corrective regimen sliding scale   SubCutaneous three times a day before meals  insulin lispro Injectable (ADMELOG) 3 Unit(s) SubCutaneous three times a day before meals  isosorbide   dinitrate Tablet (ISORDIL) 10 milliGRAM(s) Oral three times a day  melatonin 3 milliGRAM(s) Oral at bedtime PRN  metoprolol succinate ER 25 milliGRAM(s) Oral daily  polyethylene glycol 3350 17 Gram(s) Oral two times a day  rivaroxaban 15 milliGRAM(s) Oral with dinner  sodium chloride 0.65% Nasal 1 Spray(s) Both Nostrils two times a day  tamsulosin 0.4 milliGRAM(s) Oral at bedtime  tiotropium 2.5 MICROgram(s) Inhaler 2 Puff(s) Inhalation daily    A/P:    CM, EF ~ 30%, severe TR  Adm to [facility] VS 1/10 w/fluid overload   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 - 2.5)  Diuresis per HF team  F/u BMP, Mg, UO  Avoid nephrotoxins as able  Avoid hypotension     [phone]

## 2025-02-13 NOTE — PROGRESS NOTE ADULT - ASSESSMENT
80 y/o male w/ PMHx CAD s/p CABG, HFrEF (LVIDd 5.2, LVEF 25-30%) w/ DC- ICD, AFib (Xarelto), severe AS s/p TAVR, COPD on 3-4L home O2, CKD3, HTN, HLD, T2DM, and BPH presented to [facility] for ADHF, despite escalation of home diuretics. Now transferred to [facility] for refractory volume overload. His labs notable for stable Scr (though unclear baseline).  Repeat TTE shows severe BiV dysfunction.     Had a brief hypotensive episode 2/11 AM and received 500cc bolus. On exam, he remains volume up and will need to continue to diurese him with bumex gtt and will add diamox today. Palliative has followed 2/11 and at this time DNR/DNI w/ NIV and per wife - wants the ICD on.     Cardiac Studies  TTE 2/7/25 LVIDd 5.3cm, LVEF 30%, no LV thrombus, TAPSE 1.0cm, severely enlarged RV size/RVSF reduced, mod dilated LA, severe dilated RA, TAVR present, mod MR, severe TR, PASP 52, PASP 52, no pericardial effusion, IVC dilated 2.35cm  TTE 8/8/24; LVIDd 5.2, EF 25-30%, global hypokinesis, severe grade III DD, TASPE 1.1, TAVR present in AV position, mld-mod MR, mod TR, PASP 53

## 2025-02-13 NOTE — PROGRESS NOTE ADULT - PROBLEM SELECTOR PLAN 1
: Etiology likely ischemic. Primary cardiologist Dr. Jewel Stubbs ([facility]), last TTE LVEF 25-30%. Appears has been on some GDMT as outpt (?Farxiga, Coreg and previously on ARNI though unclear why stopped)  - On Toprol XL 25mg QD  - Will defer ARB/ARNI/SGLT2I/MRA for now given degree of renal dysfunction.  - Afterload: c/w ISDN 10mg TID and HDZN 10mg TID with hold parameter <95 SBP. Had brief episode of hypotension 2/11 AM  - Diuretics: c/w bumex gtt 1mg/hr. Add diamox 500mg QD x 1 today, received 2/12 x 1 dose  - Has ICD in place; Currently a DNR/DNI: trial NIV. Discussed with palliative and at this time wants ICD on as per wife  - Trend twice daily BMP, LFTs, while on continuous loop diuretics  - Aggressive PT  - Daily Standing weight with PT   - Strict I/O's  - Target electrolyte repletion to target K+ >4.0 and Mg >2.0.

## 2025-02-13 NOTE — PROGRESS NOTE ADULT - ASSESSMENT
80 y/o male w/ PMHx CAD s/p CABG, HF (combined HFrEF [EF 25%] and HFpEF [G3DD]) w/ ICD, AFib on Xarelto, severe AS s/p TAVR, COPD on 3-4L home O2, CKD4, HTN, HLD, T2DM, and BPH who presents as a transfer from [facility] for HF evaluation. Ongoing CHF exacerbation on bumex gtt.

## 2025-02-13 NOTE — PROGRESS NOTE ADULT - SUBJECTIVE AND OBJECTIVE BOX
Subjective:  -NAEO      Medications:  acetaminophen     Tablet .. 650 milliGRAM(s) Oral every 6 hours PRN  albuterol/ipratropium for Nebulization 3 milliLiter(s) Nebulizer every 6 hours PRN  aspirin enteric coated 81 milliGRAM(s) Oral daily  atorvastatin 20 milliGRAM(s) Oral at bedtime  buMETAnide Infusion 1 mG/Hr IV Continuous <Continuous>  dextrose 5%. 1000 milliLiter(s) IV Continuous <Continuous>  dextrose 5%. 1000 milliLiter(s) IV Continuous <Continuous>  dextrose 50% Injectable 25 Gram(s) IV Push once  dextrose 50% Injectable 12.5 Gram(s) IV Push once  dextrose 50% Injectable 25 Gram(s) IV Push once  dextrose Oral Gel 15 Gram(s) Oral once PRN  fluticasone propionate/ salmeterol 250-50 MICROgram(s) Diskus 1 Dose(s) Inhalation two times a day  glucagon  Injectable 1 milliGRAM(s) IntraMuscular once  hydrALAZINE 10 milliGRAM(s) Oral three times a day  insulin glargine Injectable (LANTUS) 32 Unit(s) SubCutaneous at bedtime  insulin lispro (ADMELOG) corrective regimen sliding scale   SubCutaneous three times a day before meals  insulin lispro Injectable (ADMELOG) 3 Unit(s) SubCutaneous three times a day before meals  isosorbide   dinitrate Tablet (ISORDIL) 10 milliGRAM(s) Oral three times a day  melatonin 3 milliGRAM(s) Oral at bedtime PRN  metoprolol succinate ER 25 milliGRAM(s) Oral daily  polyethylene glycol 3350 17 Gram(s) Oral two times a day  rivaroxaban 15 milliGRAM(s) Oral with dinner  sodium chloride 0.65% Nasal 1 Spray(s) Both Nostrils two times a day  tamsulosin 0.4 milliGRAM(s) Oral at bedtime  tiotropium 2.5 MICROgram(s) Inhaler 2 Puff(s) Inhalation daily      Physical Exam:    Vitals:  Vital Signs Last 24 Hours  T(C): 36.3 (25 @ 04:40), Max: 36.9 (25 @ 20:45)  HR: 70 (25 @ 10:48) (69 - 76)  BP: 122/76 (25 @ 04:40) (113/62 - 122/76)  RR: 18 (25 @ 04:40) (18 - 18)  SpO2: 97% (25 @ 10:48) (95% - 98%)    Weight in k.9 ( @ 04:40)    I&O's Summary    2025 07:01  -  2025 07:00  --------------------------------------------------------  IN: 960 mL / OUT: 1500 mL / NET: -540 mL        Tele: SR 70s    General: No distress. Comfortable.  HEENT: EOM intact.  Neck: Neck supple. JVP grossly elevated  Chest: Clear to auscultation bilaterally  CV: Normal S1 and S2. No murmurs, rub, or gallops. Radial pulses normal.  Abdomen: Soft, non-distended, non-tender  Skin: No rashes or skin breakdown  Extremities: + BLE edema and BLE ace wrapped  Neurology: Alert and oriented times three. Sensation intact  Psych: Affect normal    Labs:                        9.2    10.89 )-----------( 196      ( 2025 06:39 )             32.9         138  |  93[L]  |  70[H]  ----------------------------<  185[H]  4.5   |  28  |  2.52[H]    Ca    9.5      2025 06:39  Phos  4.5       Mg     2.4         TPro  7.5  /  Alb  3.5  /  TBili  0.7  /  DBili  x   /  AST  48[H]  /  ALT  23  /  AlkPhos  292[H]                  Lactate, Blood: 1.2 mmol/L ( @ 06:39)

## 2025-02-13 NOTE — PROGRESS NOTE ADULT - SUBJECTIVE AND OBJECTIVE BOX
Contreras Brooks MD  Division of Hospital Medicine  Available on MS teams until 7pm  If no response or off-hours, page [phone]  -------------------------------------    Patient is a 79y old  Male who presents with a chief complaint of HF eval (13 Feb 2025 13:39)      SUBJECTIVE / OVERNIGHT EVENTS: none acute  ADDITIONAL REVIEW OF SYSTEMS: mild nosebleed this morning, happens intermittently due to nasal cannula oxygen- otherwise no new complaints.     MEDICATIONS  (STANDING):  acetaZOLAMIDE Injectable 500 milliGRAM(s) IV Push once  aspirin enteric coated 81 milliGRAM(s) Oral daily  atorvastatin 20 milliGRAM(s) Oral at bedtime  buMETAnide Infusion 1 mG/Hr (5 mL/Hr) IV Continuous <Continuous>  dextrose 5%. 1000 milliLiter(s) (100 mL/Hr) IV Continuous <Continuous>  dextrose 5%. 1000 milliLiter(s) (50 mL/Hr) IV Continuous <Continuous>  dextrose 50% Injectable 25 Gram(s) IV Push once  dextrose 50% Injectable 12.5 Gram(s) IV Push once  dextrose 50% Injectable 25 Gram(s) IV Push once  fluticasone propionate/ salmeterol 250-50 MICROgram(s) Diskus 1 Dose(s) Inhalation two times a day  glucagon  Injectable 1 milliGRAM(s) IntraMuscular once  hydrALAZINE 10 milliGRAM(s) Oral three times a day  insulin glargine Injectable (LANTUS) 32 Unit(s) SubCutaneous at bedtime  insulin lispro (ADMELOG) corrective regimen sliding scale   SubCutaneous three times a day before meals  insulin lispro Injectable (ADMELOG) 3 Unit(s) SubCutaneous three times a day before meals  isosorbide   dinitrate Tablet (ISORDIL) 10 milliGRAM(s) Oral three times a day  metoprolol succinate ER 25 milliGRAM(s) Oral daily  polyethylene glycol 3350 17 Gram(s) Oral two times a day  rivaroxaban 15 milliGRAM(s) Oral with dinner  sodium chloride 0.65% Nasal 1 Spray(s) Both Nostrils two times a day  tamsulosin 0.4 milliGRAM(s) Oral at bedtime  tiotropium 2.5 MICROgram(s) Inhaler 2 Puff(s) Inhalation daily    MEDICATIONS  (PRN):  acetaminophen     Tablet .. 650 milliGRAM(s) Oral every 6 hours PRN Temp greater or equal to 38C (100.4F), Mild Pain (1 - 3)  albuterol/ipratropium for Nebulization 3 milliLiter(s) Nebulizer every 6 hours PRN Shortness of Breath and/or Wheezing  dextrose Oral Gel 15 Gram(s) Oral once PRN Blood Glucose LESS THAN 70 milliGRAM(s)/deciliter  melatonin 3 milliGRAM(s) Oral at bedtime PRN Insomnia      CAPILLARY BLOOD GLUCOSE      POCT Blood Glucose.: 225 mg/dL (13 Feb 2025 12:42)  POCT Blood Glucose.: 177 mg/dL (13 Feb 2025 08:31)  POCT Blood Glucose.: 233 mg/dL (12 Feb 2025 21:36)  POCT Blood Glucose.: 238 mg/dL (12 Feb 2025 17:11)    I&O's Summary    12 Feb 2025 07:01  -  13 Feb 2025 07:00  --------------------------------------------------------  IN: 960 mL / OUT: 1500 mL / NET: -540 mL        PHYSICAL EXAM:  Vital Signs Last 24 Hrs  T(C): 36.4 (13 Feb 2025 12:25), Max: 36.9 (12 Feb 2025 20:45)  T(F): 97.6 (13 Feb 2025 12:25), Max: 98.4 (12 Feb 2025 20:45)  HR: 72 (13 Feb 2025 12:25) (69 - 76)  BP: 96/55 (13 Feb 2025 12:25) (96/55 - 122/76)  BP(mean): --  RR: 18 (13 Feb 2025 12:25) (18 - 18)  SpO2: 93% (13 Feb 2025 12:25) (93% - 98%)    Parameters below as of 13 Feb 2025 12:25  Patient On (Oxygen Delivery Method): nasal cannula      CONSTITUTIONAL: NAD  EYES: PERRLA; conjunctiva and sclera clear  ENMT: MMM  NECK: Supple  RESPIRATORY: Normal respiratory effort; CTAB  CARDIOVASCULAR: RRR, no JVD, +2 pitting edema  ABDOMEN: Nontender to palpation, normoactive BS, no guarding/rigidity  MUSCLOSKELETAL:  no clubbing/cyanosis, no joint swelling or tenderness to palpation  PSYCH: A+O x 3, affect normal  NEUROLOGY: CN 2-12 are intact and symmetric; no gross sensory or motor deficits  SKIN: No rashes; no palpable lesions    LABS:                        9.2    10.89 )-----------( 196      ( 13 Feb 2025 06:39 )             32.9     02-13    138  |  93[L]  |  70[H]  ----------------------------<  185[H]  4.5   |  28  |  2.52[H]    Ca    9.5      13 Feb 2025 06:39  Phos  4.5     02-13  Mg     2.4     02-13    TPro  7.5  /  Alb  3.5  /  TBili  0.7  /  DBili  x   /  AST  48[H]  /  ALT  23  /  AlkPhos  292[H]  02-13          Urinalysis Basic - ( 13 Feb 2025 06:39 )    Color: x / Appearance: x / SG: x / pH: x  Gluc: 185 mg/dL / Ketone: x  / Bili: x / Urobili: x   Blood: x / Protein: x / Nitrite: x   Leuk Esterase: x / RBC: x / WBC x   Sq Epi: x / Non Sq Epi: x / Bacteria: x          RADIOLOGY & ADDITIONAL TESTS:  Results Reviewed:   Imaging Personally Reviewed:  Electrocardiogram Personally Reviewed:    COORDINATION OF CARE:  Care Discussed with Consultants/Other Providers [Y/N]:  Prior or Outpatient Records Reviewed [Y/N]:

## 2025-02-13 NOTE — PROGRESS NOTE ADULT - SUBJECTIVE AND OBJECTIVE BOX
Contreras Brooks MD  Division of Hospital Medicine  Available on MS teams until 7pm  If no response or off-hours, page [phone]  -------------------------------------    Patient is a 79y old  Male who presents with a chief complaint of HF eval (13 Feb 2025 12:24)      SUBJECTIVE / OVERNIGHT EVENTS:  ADDITIONAL REVIEW OF SYSTEMS:    MEDICATIONS  (STANDING):  acetaZOLAMIDE Injectable 500 milliGRAM(s) IV Push once  aspirin enteric coated 81 milliGRAM(s) Oral daily  atorvastatin 20 milliGRAM(s) Oral at bedtime  buMETAnide Infusion 1 mG/Hr (5 mL/Hr) IV Continuous <Continuous>  dextrose 5%. 1000 milliLiter(s) (100 mL/Hr) IV Continuous <Continuous>  dextrose 5%. 1000 milliLiter(s) (50 mL/Hr) IV Continuous <Continuous>  dextrose 50% Injectable 25 Gram(s) IV Push once  dextrose 50% Injectable 12.5 Gram(s) IV Push once  dextrose 50% Injectable 25 Gram(s) IV Push once  fluticasone propionate/ salmeterol 250-50 MICROgram(s) Diskus 1 Dose(s) Inhalation two times a day  glucagon  Injectable 1 milliGRAM(s) IntraMuscular once  hydrALAZINE 10 milliGRAM(s) Oral three times a day  insulin glargine Injectable (LANTUS) 32 Unit(s) SubCutaneous at bedtime  insulin lispro (ADMELOG) corrective regimen sliding scale   SubCutaneous three times a day before meals  insulin lispro Injectable (ADMELOG) 3 Unit(s) SubCutaneous three times a day before meals  isosorbide   dinitrate Tablet (ISORDIL) 10 milliGRAM(s) Oral three times a day  metoprolol succinate ER 25 milliGRAM(s) Oral daily  polyethylene glycol 3350 17 Gram(s) Oral two times a day  rivaroxaban 15 milliGRAM(s) Oral with dinner  sodium chloride 0.65% Nasal 1 Spray(s) Both Nostrils two times a day  tamsulosin 0.4 milliGRAM(s) Oral at bedtime  tiotropium 2.5 MICROgram(s) Inhaler 2 Puff(s) Inhalation daily    MEDICATIONS  (PRN):  acetaminophen     Tablet .. 650 milliGRAM(s) Oral every 6 hours PRN Temp greater or equal to 38C (100.4F), Mild Pain (1 - 3)  albuterol/ipratropium for Nebulization 3 milliLiter(s) Nebulizer every 6 hours PRN Shortness of Breath and/or Wheezing  dextrose Oral Gel 15 Gram(s) Oral once PRN Blood Glucose LESS THAN 70 milliGRAM(s)/deciliter  melatonin 3 milliGRAM(s) Oral at bedtime PRN Insomnia      CAPILLARY BLOOD GLUCOSE      POCT Blood Glucose.: 225 mg/dL (13 Feb 2025 12:42)  POCT Blood Glucose.: 177 mg/dL (13 Feb 2025 08:31)  POCT Blood Glucose.: 233 mg/dL (12 Feb 2025 21:36)  POCT Blood Glucose.: 238 mg/dL (12 Feb 2025 17:11)    I&O's Summary    12 Feb 2025 07:01  -  13 Feb 2025 07:00  --------------------------------------------------------  IN: 960 mL / OUT: 1500 mL / NET: -540 mL        PHYSICAL EXAM:  Vital Signs Last 24 Hrs  T(C): 36.4 (13 Feb 2025 12:25), Max: 36.9 (12 Feb 2025 20:45)  T(F): 97.6 (13 Feb 2025 12:25), Max: 98.4 (12 Feb 2025 20:45)  HR: 72 (13 Feb 2025 12:25) (69 - 76)  BP: 96/55 (13 Feb 2025 12:25) (96/55 - 122/76)  BP(mean): --  RR: 18 (13 Feb 2025 12:25) (18 - 18)  SpO2: 93% (13 Feb 2025 12:25) (93% - 98%)    Parameters below as of 13 Feb 2025 12:25  Patient On (Oxygen Delivery Method): nasal cannula      CONSTITUTIONAL: NAD  EYES: PERRLA; conjunctiva and sclera clear  ENMT: MMM  NECK: Supple  RESPIRATORY: Normal respiratory effort; CTAB  CARDIOVASCULAR: RRR, no JVD, no peripheral edema   ABDOMEN: Nontender to palpation, normoactive BS, no guarding/rigidity  MUSCLOSKELETAL:  no clubbing/cyanosis, no joint swelling or tenderness to palpation  PSYCH: A+O x 3, affect normal  NEUROLOGY: CN 2-12 are intact and symmetric; no gross sensory or motor deficits  SKIN: No rashes; no palpable lesions    LABS:                        9.2    10.89 )-----------( 196      ( 13 Feb 2025 06:39 )             32.9     02-13    138  |  93[L]  |  70[H]  ----------------------------<  185[H]  4.5   |  28  |  2.52[H]    Ca    9.5      13 Feb 2025 06:39  Phos  4.5     02-13  Mg     2.4     02-13    TPro  7.5  /  Alb  3.5  /  TBili  0.7  /  DBili  x   /  AST  48[H]  /  ALT  23  /  AlkPhos  292[H]  02-13          Urinalysis Basic - ( 13 Feb 2025 06:39 )    Color: x / Appearance: x / SG: x / pH: x  Gluc: 185 mg/dL / Ketone: x  / Bili: x / Urobili: x   Blood: x / Protein: x / Nitrite: x   Leuk Esterase: x / RBC: x / WBC x   Sq Epi: x / Non Sq Epi: x / Bacteria: x          RADIOLOGY & ADDITIONAL TESTS:  Results Reviewed:   Imaging Personally Reviewed:  Electrocardiogram Personally Reviewed:    COORDINATION OF CARE:  Care Discussed with Consultants/Other Providers [Y/N]:  Prior or Outpatient Records Reviewed [Y/N]:

## 2025-02-14 LAB
ALBUMIN SERPL ELPH-MCNC: 3.6 G/DL — SIGNIFICANT CHANGE UP (ref 3.3–5)
ALP SERPL-CCNC: 300 U/L — HIGH (ref 40–120)
ALT FLD-CCNC: 21 U/L — SIGNIFICANT CHANGE UP (ref 10–45)
ANION GAP SERPL CALC-SCNC: 13 MMOL/L — SIGNIFICANT CHANGE UP (ref 5–17)
ANION GAP SERPL CALC-SCNC: 16 MMOL/L — SIGNIFICANT CHANGE UP (ref 5–17)
AST SERPL-CCNC: 36 U/L — SIGNIFICANT CHANGE UP (ref 10–40)
BILIRUB DIRECT SERPL-MCNC: 0.3 MG/DL — SIGNIFICANT CHANGE UP (ref 0–0.3)
BILIRUB INDIRECT FLD-MCNC: 0.5 MG/DL — SIGNIFICANT CHANGE UP (ref 0.2–1)
BILIRUB SERPL-MCNC: 0.8 MG/DL — SIGNIFICANT CHANGE UP (ref 0.2–1.2)
BUN SERPL-MCNC: 70 MG/DL — HIGH (ref 7–23)
BUN SERPL-MCNC: 74 MG/DL — HIGH (ref 7–23)
CALCIUM SERPL-MCNC: 9.5 MG/DL — SIGNIFICANT CHANGE UP (ref 8.4–10.5)
CALCIUM SERPL-MCNC: 9.6 MG/DL — SIGNIFICANT CHANGE UP (ref 8.4–10.5)
CHLORIDE SERPL-SCNC: 92 MMOL/L — LOW (ref 96–108)
CHLORIDE SERPL-SCNC: 94 MMOL/L — LOW (ref 96–108)
CO2 SERPL-SCNC: 28 MMOL/L — SIGNIFICANT CHANGE UP (ref 22–31)
CO2 SERPL-SCNC: 31 MMOL/L — SIGNIFICANT CHANGE UP (ref 22–31)
CREAT SERPL-MCNC: 2.29 MG/DL — HIGH (ref 0.5–1.3)
CREAT SERPL-MCNC: 2.32 MG/DL — HIGH (ref 0.5–1.3)
EGFR: 28 ML/MIN/1.73M2 — LOW
GLUCOSE BLDC GLUCOMTR-MCNC: 212 MG/DL — HIGH (ref 70–99)
GLUCOSE BLDC GLUCOMTR-MCNC: 242 MG/DL — HIGH (ref 70–99)
GLUCOSE BLDC GLUCOMTR-MCNC: 252 MG/DL — HIGH (ref 70–99)
GLUCOSE BLDC GLUCOMTR-MCNC: 286 MG/DL — HIGH (ref 70–99)
GLUCOSE SERPL-MCNC: 217 MG/DL — HIGH (ref 70–99)
GLUCOSE SERPL-MCNC: 237 MG/DL — HIGH (ref 70–99)
HCT VFR BLD CALC: 34 % — LOW (ref 39–50)
HGB BLD-MCNC: 9.3 G/DL — LOW (ref 13–17)
MAGNESIUM SERPL-MCNC: 2.4 MG/DL — SIGNIFICANT CHANGE UP (ref 1.6–2.6)
MAGNESIUM SERPL-MCNC: 2.5 MG/DL — SIGNIFICANT CHANGE UP (ref 1.6–2.6)
MCHC RBC-ENTMCNC: 25.1 PG — LOW (ref 27–34)
MCHC RBC-ENTMCNC: 27.4 G/DL — LOW (ref 32–36)
MCV RBC AUTO: 91.9 FL — SIGNIFICANT CHANGE UP (ref 80–100)
NRBC BLD AUTO-RTO: 0 /100 WBCS — SIGNIFICANT CHANGE UP (ref 0–0)
PHOSPHATE SERPL-MCNC: 4.5 MG/DL — SIGNIFICANT CHANGE UP (ref 2.5–4.5)
PLATELET # BLD AUTO: 187 K/UL — SIGNIFICANT CHANGE UP (ref 150–400)
POTASSIUM SERPL-MCNC: 3.5 MMOL/L — SIGNIFICANT CHANGE UP (ref 3.5–5.3)
POTASSIUM SERPL-MCNC: 4.3 MMOL/L — SIGNIFICANT CHANGE UP (ref 3.5–5.3)
POTASSIUM SERPL-SCNC: 3.5 MMOL/L — SIGNIFICANT CHANGE UP (ref 3.5–5.3)
POTASSIUM SERPL-SCNC: 4.3 MMOL/L — SIGNIFICANT CHANGE UP (ref 3.5–5.3)
PROT SERPL-MCNC: 7.8 G/DL — SIGNIFICANT CHANGE UP (ref 6–8.3)
RBC # BLD: 3.7 M/UL — LOW (ref 4.2–5.8)
RBC # FLD: 23.4 % — HIGH (ref 10.3–14.5)
SODIUM SERPL-SCNC: 136 MMOL/L — SIGNIFICANT CHANGE UP (ref 135–145)
SODIUM SERPL-SCNC: 138 MMOL/L — SIGNIFICANT CHANGE UP (ref 135–145)
WBC # BLD: 10.63 K/UL — HIGH (ref 3.8–10.5)
WBC # FLD AUTO: 10.63 K/UL — HIGH (ref 3.8–10.5)

## 2025-02-14 PROCEDURE — 99232 SBSQ HOSP IP/OBS MODERATE 35: CPT

## 2025-02-14 RX ORDER — ACETAZOLAMIDE 250 MG/1
500 TABLET ORAL DAILY
Refills: 0 | Status: DISCONTINUED | OUTPATIENT
Start: 2025-02-14 | End: 2025-02-14

## 2025-02-14 RX ORDER — METOPROLOL SUCCINATE 50 MG/1
50 TABLET, EXTENDED RELEASE ORAL DAILY
Refills: 0 | Status: DISCONTINUED | OUTPATIENT
Start: 2025-02-14 | End: 2025-02-14

## 2025-02-14 RX ORDER — METOPROLOL SUCCINATE 50 MG/1
50 TABLET, EXTENDED RELEASE ORAL DAILY
Refills: 0 | Status: DISCONTINUED | OUTPATIENT
Start: 2025-02-15 | End: 2025-02-21

## 2025-02-14 RX ORDER — ACETAZOLAMIDE 250 MG/1
500 TABLET ORAL
Refills: 0 | Status: DISCONTINUED | OUTPATIENT
Start: 2025-02-14 | End: 2025-02-18

## 2025-02-14 RX ORDER — METOPROLOL SUCCINATE 50 MG/1
25 TABLET, EXTENDED RELEASE ORAL ONCE
Refills: 0 | Status: COMPLETED | OUTPATIENT
Start: 2025-02-14 | End: 2025-02-14

## 2025-02-14 RX ADMIN — ATORVASTATIN CALCIUM 20 MILLIGRAM(S): 80 TABLET, FILM COATED ORAL at 22:27

## 2025-02-14 RX ADMIN — INSULIN LISPRO 6: 100 INJECTION, SOLUTION INTRAVENOUS; SUBCUTANEOUS at 13:07

## 2025-02-14 RX ADMIN — TAMSULOSIN HYDROCHLORIDE 0.4 MILLIGRAM(S): 0.4 CAPSULE ORAL at 22:27

## 2025-02-14 RX ADMIN — POLYETHYLENE GLYCOL 3350 17 GRAM(S): 17 POWDER, FOR SOLUTION ORAL at 05:32

## 2025-02-14 RX ADMIN — Medication 1 SPRAY(S): at 18:16

## 2025-02-14 RX ADMIN — METOPROLOL SUCCINATE 25 MILLIGRAM(S): 50 TABLET, EXTENDED RELEASE ORAL at 05:32

## 2025-02-14 RX ADMIN — ISOSORBDIE DINITRATE 10 MILLIGRAM(S): 30 TABLET ORAL at 18:07

## 2025-02-14 RX ADMIN — Medication 1 DOSE(S): at 05:32

## 2025-02-14 RX ADMIN — INSULIN LISPRO 3 UNIT(S): 100 INJECTION, SOLUTION INTRAVENOUS; SUBCUTANEOUS at 18:08

## 2025-02-14 RX ADMIN — INSULIN LISPRO 3 UNIT(S): 100 INJECTION, SOLUTION INTRAVENOUS; SUBCUTANEOUS at 13:08

## 2025-02-14 RX ADMIN — INSULIN LISPRO 4: 100 INJECTION, SOLUTION INTRAVENOUS; SUBCUTANEOUS at 09:04

## 2025-02-14 RX ADMIN — Medication 81 MILLIGRAM(S): at 13:09

## 2025-02-14 RX ADMIN — Medication 1 SPRAY(S): at 05:32

## 2025-02-14 RX ADMIN — Medication 10 MILLIGRAM(S): at 05:32

## 2025-02-14 RX ADMIN — TIOTROPIUM BROMIDE INHALATION SPRAY 2 PUFF(S): 3.12 SPRAY, METERED RESPIRATORY (INHALATION) at 13:09

## 2025-02-14 RX ADMIN — Medication 10 MILLIGRAM(S): at 22:27

## 2025-02-14 RX ADMIN — ACETAZOLAMIDE 500 MILLIGRAM(S): 250 TABLET ORAL at 15:59

## 2025-02-14 RX ADMIN — INSULIN GLARGINE-YFGN 32 UNIT(S): 100 INJECTION, SOLUTION SUBCUTANEOUS at 22:26

## 2025-02-14 RX ADMIN — INSULIN LISPRO 6: 100 INJECTION, SOLUTION INTRAVENOUS; SUBCUTANEOUS at 18:08

## 2025-02-14 RX ADMIN — Medication 1 DOSE(S): at 18:07

## 2025-02-14 RX ADMIN — RIVAROXABAN 15 MILLIGRAM(S): 10 TABLET, FILM COATED ORAL at 18:07

## 2025-02-14 RX ADMIN — METOPROLOL SUCCINATE 25 MILLIGRAM(S): 50 TABLET, EXTENDED RELEASE ORAL at 18:16

## 2025-02-14 RX ADMIN — ISOSORBDIE DINITRATE 10 MILLIGRAM(S): 30 TABLET ORAL at 09:05

## 2025-02-14 RX ADMIN — INSULIN LISPRO 3 UNIT(S): 100 INJECTION, SOLUTION INTRAVENOUS; SUBCUTANEOUS at 09:05

## 2025-02-14 NOTE — CHART NOTE - NSCHARTNOTEFT_GEN_A_CORE
CC: Wide Complex Tachycardia      HPI:  Called by RN to relate that pt noted to have a run of 20 beats of WCT lasting 7.7 sec. on tele. Pt was asleep when event was recorded.  Pt seen and examined at bedside, he denied having cp. sob, dizziness, abdominal pain, n/v or palpitations        ROS:  CONSTITUTIONAL:  No fever, chills  CARDIOVASCULAR:  No chest pain or palpitations  RESPIRATORY:   No SOB, cough, dyspnea  No wheezing  GASTROINTESTINAL:  No abd pain, N/V, diarrhea/constipation  EXTREMITIES:  + swelling LE        PAST MEDICAL & SURGICAL HISTORY:  HTN (hypertension)  DM (diabetes mellitus), type 2  CHF (congestive heart failure)  Pneumonia  COPD (chronic obstructive pulmonary disease)  AICD (automatic cardioverter/defibrillator) present  Chronic renal insufficiency  HLD (hyperlipidemia)  Stage 4 chronic kidney disease  Acute on chronic heart failure with reduced ejection fraction (HFrEF, <= 40%)  CAD (coronary artery disease)  Chronic atrial fibrillation  BPH (benign prostatic hyperplasia)  S/P implantation of automatic cardioverter/defibrillator (AICD)      Vital Signs Last 24 Hrs  T(C): 35.7 (14 Feb 2025 04:03), Max: 37 (13 Feb 2025 23:05)  T(F): 96.3 (14 Feb 2025 04:03), Max: 98.6 (13 Feb 2025 23:05)  HR: 77 (14 Feb 2025 04:03) (70 - 79)  BP: 135/77 (14 Feb 2025 04:03) (96/55 - 135/77)  BP(mean): --  RR: 18 (14 Feb 2025 04:03) (18 - 18)  SpO2: 96% (14 Feb 2025 04:03) (93% - 98%)    Parameters below as of 14 Feb 2025 04:03  Patient On (Oxygen Delivery Method): nasal cannula  O2 Flow (L/min): 3        Physical Exam:  General: WN/WD NAD, AOx3, nontoxic appearing  Head:  NC/AT  CV: RRR, S1S2  Respiratory: CTA B/L, nonlabored  Abdominal: (+) bowel sounds x4. Soft, Non tender, non distended  Genitourinary: No Bucio   MSK: + BLLE edema, + peripheral pulses, FROM all 4 extremity  Skin: (+) warm, dry   Psych: Appropriate affect       Labs:                        9.2    10.89 )-----------( 196      ( 13 Feb 2025 06:39 )             32.9     02-13    135  |  91[L]  |  71[H]  ----------------------------<  314[H]  4.1   |  29  |  2.59[H]    Ca    9.2      13 Feb 2025 19:47  Phos  4.5     02-13  Mg     2.4     02-13    TPro  7.5  /  Alb  3.5  /  TBili  0.7  /  DBili  x   /  AST  48[H]  /  ALT  23  /  AlkPhos  292[H]  02-13      Urinalysis Basic - ( 02-12 @ 22:57 )    Color: -- / Appearance: Negative / SG: -- / pH: Negative  Gluc: Negative / Ketone: Yellow  / Bili: -- / Urobili: --   Blood: -- / Protein: -- / Nitrite: Negative   Leuk Esterase: Negative / RBC: 1.013 / WBC --   Sq Epi: Negative / Non Sq Epi: -- / Bacteria: 7.0      Radiology:  < from: TTE W or WO Ultrasound Enhancing Agent (02.07.25 @ 10:56) >    1. Definity ultrasound enhancing agent was given for enhanced left ventricular opacification and improved delineation of the left ventricular endocardial borders.   2. Left ventricular wall thickness is normal. Left ventricular systolic function is severely decreased with an ejection fraction visually estimated at 30 %. Global left ventricular hypokinesis.   3. Multiple segmental abnormalities exist. See findings.   4. At least "moderate" mitral regurgitation.   5. 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 Trace intra valvular regurgitation.   6. Severely enlarged right ventricular cavity size and reduced right ventricular systolic function.   7. Probably severe tricuspid regurgitation.   8. Device lead is visualized in the right heart.          Assessment & Plan:  HPI:  78 y/o male w/ PMHx CAD s/p CABG, HFrEF (LVIDd 5.2, LVEF 25-30%) w/ DC- ICD, AFib (Xarelto), severe AS s/p TAVR, COPD on 3-4L home O2, CKD3, HTN, HLD, T2DM, and BPH presented to [facility] for ADHF, despite escalation of home diuretics. Now transferred to [facility] for refractory volume overload. His labs notable for stable Scr (though unclear baseline).  Repeat TTE shows severe BiV dysfunction.     Pt noted to have had a run of WCT 20 beats lasting 7.7 sec. on tele, while asleep  Pt remained asymptomatic, and hemodynamically stable.  Arrythmia event likely in setting of severe LV systolic dysfx, with a CRT-D in place    PLAN:  >Continue to monitor  >Maintain afterload reduction with ISDN & Hydralazine  >Cont. diuresis with Bumex gtt  >Keep K > 4 Mg > 2  >HF follow up this AM  >Will endorse to day team to follow up          Time-based billing (NON-critical care).   25  minutes spent on total encounter. The necessity of the time spent during the encounter on this date of service was due to:   Need to interview and examine patient , coordinate care with hospitalist, place orders, document, personally review labs, and review prior medical records.

## 2025-02-14 NOTE — PROGRESS NOTE ADULT - SUBJECTIVE AND OBJECTIVE BOX
[facility] NEPHROLOGY SERVICES, [facility]  NEPHROLOGY AND HYPERTENSION  [address]  [address]  [place] [address]  [phone]    MD DANIEL GARRETT MD YELENA ROSENBERG, MD BINNY KOSHY, MD CHRISTOPHER CAPUTO, MD EDWARD BOVER, MD          Patient events noted  No distress    MEDICATIONS  (STANDING):  acetaZOLAMIDE Injectable 500 milliGRAM(s) IV Push <User Schedule>  aspirin enteric coated 81 milliGRAM(s) Oral daily  atorvastatin 20 milliGRAM(s) Oral at bedtime  buMETAnide Infusion 1 mG/Hr (5 mL/Hr) IV Continuous <Continuous>  dextrose 5%. 1000 milliLiter(s) (100 mL/Hr) IV Continuous <Continuous>  dextrose 5%. 1000 milliLiter(s) (50 mL/Hr) IV Continuous <Continuous>  dextrose 50% Injectable 25 Gram(s) IV Push once  dextrose 50% Injectable 12.5 Gram(s) IV Push once  dextrose 50% Injectable 25 Gram(s) IV Push once  fluticasone propionate/ salmeterol 250-50 MICROgram(s) Diskus 1 Dose(s) Inhalation two times a day  glucagon  Injectable 1 milliGRAM(s) IntraMuscular once  hydrALAZINE 10 milliGRAM(s) Oral three times a day  insulin glargine Injectable (LANTUS) 32 Unit(s) SubCutaneous at bedtime  insulin lispro (ADMELOG) corrective regimen sliding scale   SubCutaneous three times a day before meals  insulin lispro Injectable (ADMELOG) 3 Unit(s) SubCutaneous three times a day before meals  isosorbide   dinitrate Tablet (ISORDIL) 10 milliGRAM(s) Oral three times a day  metoprolol succinate ER 25 milliGRAM(s) Oral once  polyethylene glycol 3350 17 Gram(s) Oral two times a day  rivaroxaban 15 milliGRAM(s) Oral with dinner  sodium chloride 0.65% Nasal 1 Spray(s) Both Nostrils two times a day  tamsulosin 0.4 milliGRAM(s) Oral at bedtime  tiotropium 2.5 MICROgram(s) Inhaler 2 Puff(s) Inhalation daily    MEDICATIONS  (PRN):  acetaminophen     Tablet .. 650 milliGRAM(s) Oral every 6 hours PRN Temp greater or equal to 38C (100.4F), Mild Pain (1 - 3)  albuterol/ipratropium for Nebulization 3 milliLiter(s) Nebulizer every 6 hours PRN Shortness of Breath and/or Wheezing  dextrose Oral Gel 15 Gram(s) Oral once PRN Blood Glucose LESS THAN 70 milliGRAM(s)/deciliter  melatonin 3 milliGRAM(s) Oral at bedtime PRN Insomnia      02-13-25 @ 07:01  -  02-14-25 @ 07:00  --------------------------------------------------------  IN: 200 mL / OUT: 700 mL / NET: -500 mL    02-14-25 @ 07:01  -  02-14-25 @ 17:39  --------------------------------------------------------  IN: 240 mL / OUT: 0 mL / NET: 240 mL      PHYSICAL EXAM:      T(C): 36.3 (02-14-25 @ 09:33), Max: 37 (02-13-25 @ 23:05)  HR: 70 (02-14-25 @ 10:30) (68 - 79)  BP: 109/66 (02-14-25 @ 09:33) (109/66 - 135/77)  RR: 18 (02-14-25 @ 09:33) (18 - 18)  SpO2: 94% (02-14-25 @ 10:30) (94% - 99%)  Wt(kg): --  Lungs clear ant decreased BS at bases   Heart S1S2  Abd soft NT ND  Extremities:   2  edema                                    9.3    10.63 )-----------( 187      ( 14 Feb 2025 11:24 )             34.0     02-14    136  |  92[L]  |  70[H]  ----------------------------<  237[H]  3.5   |  31  |  2.32[H]    Ca    9.6      14 Feb 2025 06:55  Phos  4.5     02-14  Mg     2.4     02-14    TPro  7.8  /  Alb  3.6  /  TBili  0.8  /  DBili  0.3  /  AST  36  /  ALT  21  /  AlkPhos  300[H]  02-14      LIVER FUNCTIONS - ( 14 Feb 2025 06:55 )  Alb: 3.6 g/dL / Pro: 7.8 g/dL / ALK PHOS: 300 U/L / ALT: 21 U/L / AST: 36 U/L / GGT: x           Creatinine Trend: 2.32<--, 2.59<--, 2.52<--, 2.47<--, 2.55<--, 2.65<--      Assessment    CM, EF ~ 30%, severe TR  Adm to [facility] VS 1/10 w/fluid overload   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 - 2.5)    Plan  Diuresis per HF team  F/u BMP, Mg, UO  Avoid nephrotoxins as able  Avoid hypotension     Jon Rowe MD

## 2025-02-14 NOTE — PROGRESS NOTE ADULT - ASSESSMENT
80 y/o male w/ PMHx CAD s/p CABG, HFrEF (LVIDd 5.2, LVEF 25-30%) w/ DC- ICD, AFib (Xarelto), severe AS s/p TAVR, COPD on 3-4L home O2, CKD3, HTN, HLD, T2DM, and BPH presented to [facility] for ADHF, despite escalation of home diuretics. Now transferred to [facility] for refractory volume overload. His labs notable for stable Scr (though unclear baseline).  Repeat TTE shows severe BiV dysfunction. s/p RRT for hypotension given 500cc bolus on 2/11    While on floor he has been diuresing on Bumex gtts augmented with Diamox, making good UOP though difficult to accurately quantify giving incontinence. But overall volume status is improving he is near euvolemic and renal chemistries have been stable. GOC clarified and he remains DNR/DNI per wife - wants the ICD on.     Cardiac Studies  TTE 2/7/25 LVIDd 5.3cm, LVEF 30%, no LV thrombus, TAPSE 1.0cm, severely enlarged RV size/RVSF reduced, mod dilated LA, severe dilated RA, TAVR present, mod MR, severe TR, PASP 52, PASP 52, no pericardial effusion, IVC dilated 2.35cm  TTE 8/8/24; LVIDd 5.2, EF 25-30%, global hypokinesis, severe grade III DD, TASPE 1.1, TAVR present in AV position, mld-mod MR, mod TR, PASP 53

## 2025-02-14 NOTE — PROGRESS NOTE ADULT - ASSESSMENT
80 y/o male w/ PMHx CAD s/p CABG, HF (combined HFrEF [EF 25%] and HFpEF [G3DD]) w/ ICD, AFib on Xarelto, severe AS s/p TAVR, COPD on 3-4L home O2, CKD4, HTN, HLD, T2DM, and BPH who presents as a transfer from [facility] for HF evaluation. Ongoing CHF exacerbation on bumex gtt.

## 2025-02-14 NOTE — PROGRESS NOTE ADULT - SUBJECTIVE AND OBJECTIVE BOX
ADVANCED HEART FAILURE & TRANSPLANT  - PROGRESS NOTE  *To reach the NS2 Team from 8am to 5pm (MON-FRI), please call [phone].   _______________________________________________________________________________________________________    Subjective:    Medications:  acetaminophen     Tablet .. 650 milliGRAM(s) Oral every 6 hours PRN  albuterol/ipratropium for Nebulization 3 milliLiter(s) Nebulizer every 6 hours PRN  aspirin enteric coated 81 milliGRAM(s) Oral daily  atorvastatin 20 milliGRAM(s) Oral at bedtime  buMETAnide Infusion 1 mG/Hr IV Continuous <Continuous>  dextrose 5%. 1000 milliLiter(s) IV Continuous <Continuous>  dextrose 5%. 1000 milliLiter(s) IV Continuous <Continuous>  dextrose 50% Injectable 25 Gram(s) IV Push once  dextrose 50% Injectable 12.5 Gram(s) IV Push once  dextrose 50% Injectable 25 Gram(s) IV Push once  dextrose Oral Gel 15 Gram(s) Oral once PRN  fluticasone propionate/ salmeterol 250-50 MICROgram(s) Diskus 1 Dose(s) Inhalation two times a day  glucagon  Injectable 1 milliGRAM(s) IntraMuscular once  hydrALAZINE 10 milliGRAM(s) Oral three times a day  insulin glargine Injectable (LANTUS) 32 Unit(s) SubCutaneous at bedtime  insulin lispro (ADMELOG) corrective regimen sliding scale   SubCutaneous three times a day before meals  insulin lispro Injectable (ADMELOG) 3 Unit(s) SubCutaneous three times a day before meals  isosorbide   dinitrate Tablet (ISORDIL) 10 milliGRAM(s) Oral three times a day  melatonin 3 milliGRAM(s) Oral at bedtime PRN  metoprolol succinate ER 25 milliGRAM(s) Oral daily  polyethylene glycol 3350 17 Gram(s) Oral two times a day  rivaroxaban 15 milliGRAM(s) Oral with dinner  sodium chloride 0.65% Nasal 1 Spray(s) Both Nostrils two times a day  tamsulosin 0.4 milliGRAM(s) Oral at bedtime  tiotropium 2.5 MICROgram(s) Inhaler 2 Puff(s) Inhalation daily      Physical Exam:    Vitals:  Vital Signs Last 24 Hours  T(C): 35.7 (25 @ 04:03), Max: 37 (25 @ 23:05)  HR: 70 (25 @ 05:59) (70 - 79)  BP: 135/77 (25 @ 04:03) (96/55 - 135/77)  RR: 18 (25 @ 04:03) (18 - 18)  SpO2: 97% (25 @ 05:59) (93% - 98%)    Weight in k.5 ( @ 04:03)    I&O's Summary    2025 07:01  -  2025 07:00  --------------------------------------------------------  IN: 200 mL / OUT: 700 mL / NET: -500 mL        Tele:    General: No distress. Comfortable.  HEENT: EOM intact.  Neck: Neck supple. JVP not elevated. No masses  Chest: Clear to auscultation bilaterally  CV: Normal S1 and S2. No murmurs, rub, or gallops. Radial pulses normal.  Abdomen: Soft, non-distended, non-tender  Skin: No rashes or skin breakdown  Extremities: No LE edema  Neurology: Alert and oriented times three. Sensation intact  Psych: Affect normal    Labs:                        9.2    10.89 )-----------( 196      ( 2025 06:39 )             32.9         136  |  92[L]  |  70[H]  ----------------------------<  237[H]  3.5   |  31  |  2.32[H]    Ca    9.6      2025 06:55  Phos  4.5       Mg     2.4         TPro  7.8  /  Alb  3.6  /  TBili  0.8  /  DBili  0.3  /  AST  36  /  ALT  21  /  AlkPhos  300[H]                  Lactate, Blood: 1.2 mmol/L ( @ 06:39)     ADVANCED HEART FAILURE & TRANSPLANT  - PROGRESS NOTE  *To reach the NS2 Team from 8am to 5pm (MON-FRI), please call [phone].   _______________________________________________________________________________________________________    Subjective:  - NSVT overnight  - Otherwise seen sitting upright; Denies SOB at rest    Medications:  acetaminophen     Tablet .. 650 milliGRAM(s) Oral every 6 hours PRN  albuterol/ipratropium for Nebulization 3 milliLiter(s) Nebulizer every 6 hours PRN  aspirin enteric coated 81 milliGRAM(s) Oral daily  atorvastatin 20 milliGRAM(s) Oral at bedtime  buMETAnide Infusion 1 mG/Hr IV Continuous <Continuous>  dextrose 5%. 1000 milliLiter(s) IV Continuous <Continuous>  dextrose 5%. 1000 milliLiter(s) IV Continuous <Continuous>  dextrose 50% Injectable 25 Gram(s) IV Push once  dextrose 50% Injectable 12.5 Gram(s) IV Push once  dextrose 50% Injectable 25 Gram(s) IV Push once  dextrose Oral Gel 15 Gram(s) Oral once PRN  fluticasone propionate/ salmeterol 250-50 MICROgram(s) Diskus 1 Dose(s) Inhalation two times a day  glucagon  Injectable 1 milliGRAM(s) IntraMuscular once  hydrALAZINE 10 milliGRAM(s) Oral three times a day  insulin glargine Injectable (LANTUS) 32 Unit(s) SubCutaneous at bedtime  insulin lispro (ADMELOG) corrective regimen sliding scale   SubCutaneous three times a day before meals  insulin lispro Injectable (ADMELOG) 3 Unit(s) SubCutaneous three times a day before meals  isosorbide   dinitrate Tablet (ISORDIL) 10 milliGRAM(s) Oral three times a day  melatonin 3 milliGRAM(s) Oral at bedtime PRN  metoprolol succinate ER 25 milliGRAM(s) Oral daily  polyethylene glycol 3350 17 Gram(s) Oral two times a day  rivaroxaban 15 milliGRAM(s) Oral with dinner  sodium chloride 0.65% Nasal 1 Spray(s) Both Nostrils two times a day  tamsulosin 0.4 milliGRAM(s) Oral at bedtime  tiotropium 2.5 MICROgram(s) Inhaler 2 Puff(s) Inhalation daily      Physical Exam:    Vitals:  Vital Signs Last 24 Hours  T(C): 35.7 (25 @ 04:03), Max: 37 (25 @ 23:05)  HR: 70 (25 @ 05:59) (70 - 79)  BP: 135/77 (25 @ 04:03) (96/55 - 135/77)  RR: 18 (25 @ 04:03) (18 - 18)  SpO2: 97% (25 @ 05:59) (93% - 98%)    Weight in k.5 ( @ 04:03)    I&O's Summary    2025 07:01  -  2025 07:00  --------------------------------------------------------  IN: 200 mL / OUT: 700 mL / NET: -500 mL    Tele: SR 70's    General: No distress. Comfortable.  HEENT: EOM intact.  Neck: JVP remains elevated  Chest: Clear to auscultation bilaterally  CV: Normal S1 and S2. No murmurs, rub, or gallops. Radial pulses normal, warm peripherally  Abdomen: Soft, non-distended, non-tender  Skin: No rashes or skin breakdown  Extremities: +1 BLE  Neurology: Alert and oriented times three. Sensation intact  Psych: Affect normal    Labs:                        9.2    10.89 )-----------( 196      ( 2025 06:39 )             32.9         136  |  92[L]  |  70[H]  ----------------------------<  237[H]  3.5   |  31  |  2.32[H]    Ca    9.6      2025 06:55  Phos  4.5       Mg     2.4         TPro  7.8  /  Alb  3.6  /  TBili  0.8  /  DBili  0.3  /  AST  36  /  ALT  21  /  AlkPhos  300[H]    Lactate, Blood: 1.2 mmol/L ( @ 06:39)

## 2025-02-14 NOTE — PROGRESS NOTE ADULT - PROBLEM SELECTOR PLAN 1
: Etiology likely ischemic. Primary cardiologist Dr. Jewel Stubbs ([facility]), last TTE LVEF 25-30%. Appears has been on some GDMT as outpt (?Farxiga, Coreg and previously on ARNI though unclear why stopped)  - Increase Toprol XL to 50mg QD from 25mg: Please stagger BB from afterload agents. Given in afternoon  - Will defer ARB/ARNI/SGLT2I/MRA for now given degree of renal dysfunction.  - Afterload: c/w ISDN 10mg TID and HDZN 10mg TID with hold parameter <90 SBP  - Diuretics: c/w bumex gtt 1mg/hr. Start diamox 500mg QD today; Will re-assess volume status  - Has ICD in place; Currently a DNR/DNI: trial NIV. Discussed with palliative and at this time wants ICD on as per wife  - Trend twice daily BMP, LFTs, while on continuous loop diuretics  - Aggressive PT  - Daily Standing weight with PT   - Strict I/O's  - Target electrolyte repletion to target K+ >4.0 and Mg >2.0.

## 2025-02-14 NOTE — PROGRESS NOTE ADULT - SUBJECTIVE AND OBJECTIVE BOX
Contreras Brooks MD  Division of Hospital Medicine  Available on MS teams until 7pm  If no response or off-hours, page [phone]  -------------------------------------    Patient is a 79y old  Male who presents with a chief complaint of HF eval (14 Feb 2025 08:25)      SUBJECTIVE / OVERNIGHT EVENTS: none acute  ADDITIONAL REVIEW OF SYSTEMS: pt feels ok, got out of bed yesterday, eager to do it again today, no sob/cough, no fevers/chills.     MEDICATIONS  (STANDING):  acetaZOLAMIDE Injectable 500 milliGRAM(s) IV Push <User Schedule>  aspirin enteric coated 81 milliGRAM(s) Oral daily  atorvastatin 20 milliGRAM(s) Oral at bedtime  buMETAnide Infusion 1 mG/Hr (5 mL/Hr) IV Continuous <Continuous>  dextrose 5%. 1000 milliLiter(s) (100 mL/Hr) IV Continuous <Continuous>  dextrose 5%. 1000 milliLiter(s) (50 mL/Hr) IV Continuous <Continuous>  dextrose 50% Injectable 25 Gram(s) IV Push once  dextrose 50% Injectable 12.5 Gram(s) IV Push once  dextrose 50% Injectable 25 Gram(s) IV Push once  fluticasone propionate/ salmeterol 250-50 MICROgram(s) Diskus 1 Dose(s) Inhalation two times a day  glucagon  Injectable 1 milliGRAM(s) IntraMuscular once  hydrALAZINE 10 milliGRAM(s) Oral three times a day  insulin glargine Injectable (LANTUS) 32 Unit(s) SubCutaneous at bedtime  insulin lispro (ADMELOG) corrective regimen sliding scale   SubCutaneous three times a day before meals  insulin lispro Injectable (ADMELOG) 3 Unit(s) SubCutaneous three times a day before meals  isosorbide   dinitrate Tablet (ISORDIL) 10 milliGRAM(s) Oral three times a day  metoprolol succinate ER 25 milliGRAM(s) Oral once  polyethylene glycol 3350 17 Gram(s) Oral two times a day  rivaroxaban 15 milliGRAM(s) Oral with dinner  sodium chloride 0.65% Nasal 1 Spray(s) Both Nostrils two times a day  tamsulosin 0.4 milliGRAM(s) Oral at bedtime  tiotropium 2.5 MICROgram(s) Inhaler 2 Puff(s) Inhalation daily    MEDICATIONS  (PRN):  acetaminophen     Tablet .. 650 milliGRAM(s) Oral every 6 hours PRN Temp greater or equal to 38C (100.4F), Mild Pain (1 - 3)  albuterol/ipratropium for Nebulization 3 milliLiter(s) Nebulizer every 6 hours PRN Shortness of Breath and/or Wheezing  dextrose Oral Gel 15 Gram(s) Oral once PRN Blood Glucose LESS THAN 70 milliGRAM(s)/deciliter  melatonin 3 milliGRAM(s) Oral at bedtime PRN Insomnia      CAPILLARY BLOOD GLUCOSE      POCT Blood Glucose.: 286 mg/dL (14 Feb 2025 12:44)  POCT Blood Glucose.: 212 mg/dL (14 Feb 2025 08:33)  POCT Blood Glucose.: 275 mg/dL (13 Feb 2025 21:49)  POCT Blood Glucose.: 263 mg/dL (13 Feb 2025 17:24)    I&O's Summary    13 Feb 2025 07:01  -  14 Feb 2025 07:00  --------------------------------------------------------  IN: 200 mL / OUT: 700 mL / NET: -500 mL        PHYSICAL EXAM:  Vital Signs Last 24 Hrs  T(C): 36.3 (14 Feb 2025 09:33), Max: 37 (13 Feb 2025 23:05)  T(F): 97.3 (14 Feb 2025 09:33), Max: 98.6 (13 Feb 2025 23:05)  HR: 68 (14 Feb 2025 09:33) (68 - 79)  BP: 109/66 (14 Feb 2025 09:33) (109/66 - 135/77)  BP(mean): --  RR: 18 (14 Feb 2025 09:33) (18 - 18)  SpO2: 99% (14 Feb 2025 09:33) (96% - 99%)    Parameters below as of 14 Feb 2025 09:33  Patient On (Oxygen Delivery Method): nasal cannula      CONSTITUTIONAL: NAD  EYES: PERRLA; conjunctiva and sclera clear  ENMT: MMM  NECK: Supple  RESPIRATORY: Normal respiratory effort; CTAB  CARDIOVASCULAR: RRR, no JVD, minimal edema s/p wrapping  ABDOMEN: Nontender to palpation, normoactive BS, no guarding/rigidity  MUSCLOSKELETAL:  no clubbing/cyanosis, no joint swelling or tenderness to palpation  PSYCH: A+O x 3, affect normal  NEUROLOGY: CN 2-12 are intact and symmetric; no gross sensory or motor deficits  SKIN: No rashes; no palpable lesions    LABS:                        9.3    10.63 )-----------( 187      ( 14 Feb 2025 11:24 )             34.0     02-14    136  |  92[L]  |  70[H]  ----------------------------<  237[H]  3.5   |  31  |  2.32[H]    Ca    9.6      14 Feb 2025 06:55  Phos  4.5     02-14  Mg     2.4     02-14    TPro  7.8  /  Alb  3.6  /  TBili  0.8  /  DBili  0.3  /  AST  36  /  ALT  21  /  AlkPhos  300[H]  02-14          Urinalysis Basic - ( 14 Feb 2025 06:55 )    Color: x / Appearance: x / SG: x / pH: x  Gluc: 237 mg/dL / Ketone: x  / Bili: x / Urobili: x   Blood: x / Protein: x / Nitrite: x   Leuk Esterase: x / RBC: x / WBC x   Sq Epi: x / Non Sq Epi: x / Bacteria: x          RADIOLOGY & ADDITIONAL TESTS:  Results Reviewed:   Imaging Personally Reviewed:  Electrocardiogram Personally Reviewed:    COORDINATION OF CARE:  Care Discussed with Consultants/Other Providers [Y/N]: HF  Prior or Outpatient Records Reviewed [Y/N]:

## 2025-02-15 LAB
ANION GAP SERPL CALC-SCNC: 15 MMOL/L — SIGNIFICANT CHANGE UP (ref 5–17)
ANION GAP SERPL CALC-SCNC: 15 MMOL/L — SIGNIFICANT CHANGE UP (ref 5–17)
BUN SERPL-MCNC: 70 MG/DL — HIGH (ref 7–23)
BUN SERPL-MCNC: 71 MG/DL — HIGH (ref 7–23)
CALCIUM SERPL-MCNC: 9.3 MG/DL — SIGNIFICANT CHANGE UP (ref 8.4–10.5)
CALCIUM SERPL-MCNC: 9.4 MG/DL — SIGNIFICANT CHANGE UP (ref 8.4–10.5)
CHLORIDE SERPL-SCNC: 94 MMOL/L — LOW (ref 96–108)
CHLORIDE SERPL-SCNC: 95 MMOL/L — LOW (ref 96–108)
CO2 SERPL-SCNC: 31 MMOL/L — SIGNIFICANT CHANGE UP (ref 22–31)
CO2 SERPL-SCNC: 31 MMOL/L — SIGNIFICANT CHANGE UP (ref 22–31)
CREAT SERPL-MCNC: 2.22 MG/DL — HIGH (ref 0.5–1.3)
CREAT SERPL-MCNC: 2.22 MG/DL — HIGH (ref 0.5–1.3)
EGFR: 29 ML/MIN/1.73M2 — LOW
GLUCOSE BLDC GLUCOMTR-MCNC: 163 MG/DL — HIGH (ref 70–99)
GLUCOSE BLDC GLUCOMTR-MCNC: 223 MG/DL — HIGH (ref 70–99)
GLUCOSE BLDC GLUCOMTR-MCNC: 236 MG/DL — HIGH (ref 70–99)
GLUCOSE BLDC GLUCOMTR-MCNC: 268 MG/DL — HIGH (ref 70–99)
GLUCOSE SERPL-MCNC: 172 MG/DL — HIGH (ref 70–99)
GLUCOSE SERPL-MCNC: 228 MG/DL — HIGH (ref 70–99)
MAGNESIUM SERPL-MCNC: 2.5 MG/DL — SIGNIFICANT CHANGE UP (ref 1.6–2.6)
POTASSIUM SERPL-MCNC: 3.2 MMOL/L — LOW (ref 3.5–5.3)
POTASSIUM SERPL-MCNC: 3.8 MMOL/L — SIGNIFICANT CHANGE UP (ref 3.5–5.3)
POTASSIUM SERPL-SCNC: 3.2 MMOL/L — LOW (ref 3.5–5.3)
POTASSIUM SERPL-SCNC: 3.8 MMOL/L — SIGNIFICANT CHANGE UP (ref 3.5–5.3)
SODIUM SERPL-SCNC: 140 MMOL/L — SIGNIFICANT CHANGE UP (ref 135–145)
SODIUM SERPL-SCNC: 141 MMOL/L — SIGNIFICANT CHANGE UP (ref 135–145)

## 2025-02-15 PROCEDURE — 99232 SBSQ HOSP IP/OBS MODERATE 35: CPT

## 2025-02-15 RX ADMIN — Medication 1 DOSE(S): at 17:28

## 2025-02-15 RX ADMIN — INSULIN LISPRO 3 UNIT(S): 100 INJECTION, SOLUTION INTRAVENOUS; SUBCUTANEOUS at 08:58

## 2025-02-15 RX ADMIN — ISOSORBDIE DINITRATE 10 MILLIGRAM(S): 30 TABLET ORAL at 09:01

## 2025-02-15 RX ADMIN — Medication 10 MILLIGRAM(S): at 21:06

## 2025-02-15 RX ADMIN — Medication 81 MILLIGRAM(S): at 12:12

## 2025-02-15 RX ADMIN — RIVAROXABAN 15 MILLIGRAM(S): 10 TABLET, FILM COATED ORAL at 17:28

## 2025-02-15 RX ADMIN — TIOTROPIUM BROMIDE INHALATION SPRAY 2 PUFF(S): 3.12 SPRAY, METERED RESPIRATORY (INHALATION) at 12:12

## 2025-02-15 RX ADMIN — INSULIN LISPRO 3 UNIT(S): 100 INJECTION, SOLUTION INTRAVENOUS; SUBCUTANEOUS at 13:02

## 2025-02-15 RX ADMIN — ATORVASTATIN CALCIUM 20 MILLIGRAM(S): 80 TABLET, FILM COATED ORAL at 21:06

## 2025-02-15 RX ADMIN — TAMSULOSIN HYDROCHLORIDE 0.4 MILLIGRAM(S): 0.4 CAPSULE ORAL at 21:06

## 2025-02-15 RX ADMIN — METOPROLOL SUCCINATE 50 MILLIGRAM(S): 50 TABLET, EXTENDED RELEASE ORAL at 05:53

## 2025-02-15 RX ADMIN — INSULIN LISPRO 6: 100 INJECTION, SOLUTION INTRAVENOUS; SUBCUTANEOUS at 13:02

## 2025-02-15 RX ADMIN — INSULIN LISPRO 2: 100 INJECTION, SOLUTION INTRAVENOUS; SUBCUTANEOUS at 08:57

## 2025-02-15 RX ADMIN — Medication 20 MILLIEQUIVALENT(S): at 14:13

## 2025-02-15 RX ADMIN — Medication 20 MILLIEQUIVALENT(S): at 17:27

## 2025-02-15 RX ADMIN — ACETAZOLAMIDE 500 MILLIGRAM(S): 250 TABLET ORAL at 13:16

## 2025-02-15 RX ADMIN — Medication 10 MILLIGRAM(S): at 13:09

## 2025-02-15 RX ADMIN — INSULIN LISPRO 4: 100 INJECTION, SOLUTION INTRAVENOUS; SUBCUTANEOUS at 17:26

## 2025-02-15 RX ADMIN — Medication 1 SPRAY(S): at 17:29

## 2025-02-15 RX ADMIN — Medication 10 MILLIGRAM(S): at 05:53

## 2025-02-15 RX ADMIN — Medication 1 SPRAY(S): at 05:53

## 2025-02-15 RX ADMIN — INSULIN GLARGINE-YFGN 32 UNIT(S): 100 INJECTION, SOLUTION SUBCUTANEOUS at 21:20

## 2025-02-15 RX ADMIN — Medication 20 MILLIEQUIVALENT(S): at 16:11

## 2025-02-15 RX ADMIN — POLYETHYLENE GLYCOL 3350 17 GRAM(S): 17 POWDER, FOR SOLUTION ORAL at 05:53

## 2025-02-15 RX ADMIN — Medication 1 DOSE(S): at 05:53

## 2025-02-15 RX ADMIN — INSULIN LISPRO 3 UNIT(S): 100 INJECTION, SOLUTION INTRAVENOUS; SUBCUTANEOUS at 17:27

## 2025-02-15 RX ADMIN — ISOSORBDIE DINITRATE 10 MILLIGRAM(S): 30 TABLET ORAL at 17:33

## 2025-02-15 NOTE — PROGRESS NOTE ADULT - PROBLEM SELECTOR PLAN 1
EF 30% with severe tricuspid regurg and PASP 55mmHG on 2/7/24  - Diuretic:  bumex gtt 1mg/hr, Diamox back on, continuing   - BB:  Metoprolol succinate 50mg day  - ARNI/ACE-I/ARB: Ramipril 2.5mg daily - hold given DENISHA on CKD  - Hydralazine/Nitrate: Add hydral 10mg TID. Isodril 10mg TID   - MRA: Was on spironolactone last year, will reintroduce pending denisha  - SGLT2: none   dry wt is 243 lbs

## 2025-02-15 NOTE — PROGRESS NOTE ADULT - SUBJECTIVE AND OBJECTIVE BOX
[facility] NEPHROLOGY SERVICES, [facility]  NEPHROLOGY AND HYPERTENSION  [address]  [address]  [place] [address]  [phone]    MD DANIEL GARRETT MD YELENA ROSENBERG, MD BINNY KOSHY, MD CHRISTOPHER CAPUTO, MD EDWARD BOVER, MD          Patient events noted    MEDICATIONS  (STANDING):  acetaZOLAMIDE Injectable 500 milliGRAM(s) IV Push <User Schedule>  aspirin enteric coated 81 milliGRAM(s) Oral daily  atorvastatin 20 milliGRAM(s) Oral at bedtime  buMETAnide Infusion 1 mG/Hr (5 mL/Hr) IV Continuous <Continuous>  dextrose 5%. 1000 milliLiter(s) (50 mL/Hr) IV Continuous <Continuous>  dextrose 5%. 1000 milliLiter(s) (100 mL/Hr) IV Continuous <Continuous>  dextrose 50% Injectable 25 Gram(s) IV Push once  dextrose 50% Injectable 12.5 Gram(s) IV Push once  dextrose 50% Injectable 25 Gram(s) IV Push once  fluticasone propionate/ salmeterol 250-50 MICROgram(s) Diskus 1 Dose(s) Inhalation two times a day  glucagon  Injectable 1 milliGRAM(s) IntraMuscular once  hydrALAZINE 10 milliGRAM(s) Oral three times a day  insulin glargine Injectable (LANTUS) 32 Unit(s) SubCutaneous at bedtime  insulin lispro (ADMELOG) corrective regimen sliding scale   SubCutaneous three times a day before meals  insulin lispro Injectable (ADMELOG) 3 Unit(s) SubCutaneous three times a day before meals  isosorbide   dinitrate Tablet (ISORDIL) 10 milliGRAM(s) Oral three times a day  metoprolol succinate ER 50 milliGRAM(s) Oral daily  polyethylene glycol 3350 17 Gram(s) Oral two times a day  rivaroxaban 15 milliGRAM(s) Oral with dinner  sodium chloride 0.65% Nasal 1 Spray(s) Both Nostrils two times a day  tamsulosin 0.4 milliGRAM(s) Oral at bedtime  tiotropium 2.5 MICROgram(s) Inhaler 2 Puff(s) Inhalation daily    MEDICATIONS  (PRN):  acetaminophen     Tablet .. 650 milliGRAM(s) Oral every 6 hours PRN Temp greater or equal to 38C (100.4F), Mild Pain (1 - 3)  albuterol/ipratropium for Nebulization 3 milliLiter(s) Nebulizer every 6 hours PRN Shortness of Breath and/or Wheezing  dextrose Oral Gel 15 Gram(s) Oral once PRN Blood Glucose LESS THAN 70 milliGRAM(s)/deciliter  melatonin 3 milliGRAM(s) Oral at bedtime PRN Insomnia      02-14-25 @ 07:01  -  02-15-25 @ 07:00  --------------------------------------------------------  IN: 540 mL / OUT: 550 mL / NET: -10 mL    02-15-25 @ 07:01  -  02-15-25 @ 19:29  --------------------------------------------------------  IN: 880 mL / OUT: 1000 mL / NET: -120 mL      PHYSICAL EXAM:      T(C): 36.7 (02-15-25 @ 15:00), Max: 36.8 (02-15-25 @ 13:11)  HR: 82 (02-15-25 @ 15:00) (64 - 93)  BP: 120/70 (02-15-25 @ 17:35) (103/58 - 122/74)  RR: 18 (02-15-25 @ 15:00) (18 - 18)  SpO2: 96% (02-15-25 @ 15:00) (93% - 98%)  Wt(kg): --  Lungs clear  Heart S1S2  Abd soft NT ND  Extremities:   1 edema                                    9.3    10.63 )-----------( 187      ( 14 Feb 2025 11:24 )             34.0     02-15    140  |  94[L]  |  70[H]  ----------------------------<  228[H]  3.8   |  31  |  2.22[H]    Ca    9.3      15 Feb 2025 17:44  Phos  4.5     02-14  Mg     2.5     02-15    TPro  7.8  /  Alb  3.6  /  TBili  0.8  /  DBili  0.3  /  AST  36  /  ALT  21  /  AlkPhos  300[H]  02-14      LIVER FUNCTIONS - ( 14 Feb 2025 06:55 )  Alb: 3.6 g/dL / Pro: 7.8 g/dL / ALK PHOS: 300 U/L / ALT: 21 U/L / AST: 36 U/L / GGT: x           Creatinine Trend: 2.22<--, 2.22<--, 2.29<--, 2.32<--, 2.59<--, 2.52<--          Assessment    CM, EF ~ 30%, severe TR  Adm to [facility] VS 1/10 w/fluid overload   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 - 2.5)    Plan  Diuresis per HF team  F/u BMP, Mg, UO  Avoid nephrotoxins as able  Avoid hypotension   Jon Rowe MD

## 2025-02-15 NOTE — PROGRESS NOTE ADULT - ASSESSMENT
78 y/o male w/ PMHx CAD s/p CABG, HF (combined HFrEF [EF 25%] and HFpEF [G3DD]) w/ ICD, AFib on Xarelto, severe AS s/p TAVR, COPD on 3-4L home O2, CKD4, HTN, HLD, T2DM, and BPH who presents as a transfer from [facility] for HF evaluation. Ongoing CHF exacerbation on bumex gtt.

## 2025-02-15 NOTE — PROGRESS NOTE ADULT - SUBJECTIVE AND OBJECTIVE BOX
Brown Bowie MD  [facility] Division of Hospital Medicine    SUBJECTIVE / OVERNIGHT EVENTS:  - no events overnight, no n/v/abd pain/cough/chest pain. states his breathng is stable.     MEDICATIONS  (STANDING):  acetaZOLAMIDE Injectable 500 milliGRAM(s) IV Push <User Schedule>  aspirin enteric coated 81 milliGRAM(s) Oral daily  atorvastatin 20 milliGRAM(s) Oral at bedtime  buMETAnide Infusion 1 mG/Hr (5 mL/Hr) IV Continuous <Continuous>  dextrose 5%. 1000 milliLiter(s) (50 mL/Hr) IV Continuous <Continuous>  dextrose 5%. 1000 milliLiter(s) (100 mL/Hr) IV Continuous <Continuous>  dextrose 50% Injectable 25 Gram(s) IV Push once  dextrose 50% Injectable 12.5 Gram(s) IV Push once  dextrose 50% Injectable 25 Gram(s) IV Push once  fluticasone propionate/ salmeterol 250-50 MICROgram(s) Diskus 1 Dose(s) Inhalation two times a day  glucagon  Injectable 1 milliGRAM(s) IntraMuscular once  hydrALAZINE 10 milliGRAM(s) Oral three times a day  insulin glargine Injectable (LANTUS) 32 Unit(s) SubCutaneous at bedtime  insulin lispro (ADMELOG) corrective regimen sliding scale   SubCutaneous three times a day before meals  insulin lispro Injectable (ADMELOG) 3 Unit(s) SubCutaneous three times a day before meals  isosorbide   dinitrate Tablet (ISORDIL) 10 milliGRAM(s) Oral three times a day  metoprolol succinate ER 50 milliGRAM(s) Oral daily  polyethylene glycol 3350 17 Gram(s) Oral two times a day  potassium chloride    Tablet ER 20 milliEquivalent(s) Oral every 2 hours  rivaroxaban 15 milliGRAM(s) Oral with dinner  sodium chloride 0.65% Nasal 1 Spray(s) Both Nostrils two times a day  tamsulosin 0.4 milliGRAM(s) Oral at bedtime  tiotropium 2.5 MICROgram(s) Inhaler 2 Puff(s) Inhalation daily    MEDICATIONS  (PRN):  acetaminophen     Tablet .. 650 milliGRAM(s) Oral every 6 hours PRN Temp greater or equal to 38C (100.4F), Mild Pain (1 - 3)  albuterol/ipratropium for Nebulization 3 milliLiter(s) Nebulizer every 6 hours PRN Shortness of Breath and/or Wheezing  dextrose Oral Gel 15 Gram(s) Oral once PRN Blood Glucose LESS THAN 70 milliGRAM(s)/deciliter  melatonin 3 milliGRAM(s) Oral at bedtime PRN Insomnia      I&O's Summary    14 Feb 2025 07:01  -  15 Feb 2025 07:00  --------------------------------------------------------  IN: 540 mL / OUT: 550 mL / NET: -10 mL        PHYSICAL EXAM:  Vital Signs Last 24 Hrs  T(C): 36.3 (15 Feb 2025 11:50), Max: 36.6 (14 Feb 2025 20:25)  T(F): 97.4 (15 Feb 2025 11:50), Max: 97.8 (14 Feb 2025 20:25)  HR: 69 (15 Feb 2025 11:50) (64 - 93)  BP: 103/63 (15 Feb 2025 11:50) (103/63 - 122/74)  BP(mean): --  RR: 18 (15 Feb 2025 11:50) (18 - 18)  SpO2: 98% (15 Feb 2025 11:50) (93% - 98%)    Parameters below as of 15 Feb 2025 11:50  Patient On (Oxygen Delivery Method): nasal cannula  O2 Flow (L/min): 3    CONSTITUTIONAL: NAD  EYES: PERRLA; conjunctiva and sclera clear  RESPIRATORY: Normal respiratory effort; CTAB  CARDIOVASCULAR: RRR, no JVD, minimal edema s/p wrapping  ABDOMEN: Nontender to palpation, normoactive BS, no guarding/rigidity  MUSCLOSKELETAL:  no clubbing/cyanosis, no joint swelling or tenderness to palpation  PSYCH: A+O x 3, affect normal  SKIN: No rashes; no palpable lesions    LABS:                        9.3    10.63 )-----------( 187      ( 14 Feb 2025 11:24 )             34.0     02-15    141  |  95[L]  |  71[H]  ----------------------------<  172[H]  3.2[L]   |  31  |  2.22[H]    Ca    9.4      15 Feb 2025 07:25  Phos  4.5     02-14  Mg     2.5     02-15    TPro  7.8  /  Alb  3.6  /  TBili  0.8  /  DBili  0.3  /  AST  36  /  ALT  21  /  AlkPhos  300[H]  02-14          Urinalysis Basic - ( 15 Feb 2025 07:25 )    Color: x / Appearance: x / SG: x / pH: x  Gluc: 172 mg/dL / Ketone: x  / Bili: x / Urobili: x   Blood: x / Protein: x / Nitrite: x   Leuk Esterase: x / RBC: x / WBC x   Sq Epi: x / Non Sq Epi: x / Bacteria: x        SARS-CoV-2: NotDetec (10 Tay 2025 19:29)

## 2025-02-16 LAB
ALBUMIN SERPL ELPH-MCNC: 3.3 G/DL — SIGNIFICANT CHANGE UP (ref 3.3–5)
ALP SERPL-CCNC: 327 U/L — HIGH (ref 40–120)
ALT FLD-CCNC: 27 U/L — SIGNIFICANT CHANGE UP (ref 10–45)
ANION GAP SERPL CALC-SCNC: 16 MMOL/L — SIGNIFICANT CHANGE UP (ref 5–17)
AST SERPL-CCNC: 43 U/L — HIGH (ref 10–40)
BILIRUB SERPL-MCNC: 0.9 MG/DL — SIGNIFICANT CHANGE UP (ref 0.2–1.2)
BUN SERPL-MCNC: 74 MG/DL — HIGH (ref 7–23)
CALCIUM SERPL-MCNC: 9.2 MG/DL — SIGNIFICANT CHANGE UP (ref 8.4–10.5)
CHLORIDE SERPL-SCNC: 98 MMOL/L — SIGNIFICANT CHANGE UP (ref 96–108)
CO2 SERPL-SCNC: 29 MMOL/L — SIGNIFICANT CHANGE UP (ref 22–31)
CREAT SERPL-MCNC: 2.02 MG/DL — HIGH (ref 0.5–1.3)
EGFR: 33 ML/MIN/1.73M2 — LOW
EGFR: 33 ML/MIN/1.73M2 — LOW
GLUCOSE BLDC GLUCOMTR-MCNC: 136 MG/DL — HIGH (ref 70–99)
GLUCOSE BLDC GLUCOMTR-MCNC: 176 MG/DL — HIGH (ref 70–99)
GLUCOSE BLDC GLUCOMTR-MCNC: 203 MG/DL — HIGH (ref 70–99)
GLUCOSE BLDC GLUCOMTR-MCNC: 211 MG/DL — HIGH (ref 70–99)
GLUCOSE SERPL-MCNC: 183 MG/DL — HIGH (ref 70–99)
MAGNESIUM SERPL-MCNC: 2.6 MG/DL — SIGNIFICANT CHANGE UP (ref 1.6–2.6)
PHOSPHATE SERPL-MCNC: 4.3 MG/DL — SIGNIFICANT CHANGE UP (ref 2.5–4.5)
POTASSIUM SERPL-MCNC: 4.2 MMOL/L — SIGNIFICANT CHANGE UP (ref 3.5–5.3)
POTASSIUM SERPL-SCNC: 4.2 MMOL/L — SIGNIFICANT CHANGE UP (ref 3.5–5.3)
PROT SERPL-MCNC: 7.9 G/DL — SIGNIFICANT CHANGE UP (ref 6–8.3)
SODIUM SERPL-SCNC: 143 MMOL/L — SIGNIFICANT CHANGE UP (ref 135–145)

## 2025-02-16 PROCEDURE — 99233 SBSQ HOSP IP/OBS HIGH 50: CPT

## 2025-02-16 RX ADMIN — METOPROLOL SUCCINATE 50 MILLIGRAM(S): 50 TABLET, EXTENDED RELEASE ORAL at 05:19

## 2025-02-16 RX ADMIN — ACETAZOLAMIDE 500 MILLIGRAM(S): 250 TABLET ORAL at 16:02

## 2025-02-16 RX ADMIN — ISOSORBDIE DINITRATE 10 MILLIGRAM(S): 30 TABLET ORAL at 17:59

## 2025-02-16 RX ADMIN — POLYETHYLENE GLYCOL 3350 17 GRAM(S): 17 POWDER, FOR SOLUTION ORAL at 05:19

## 2025-02-16 RX ADMIN — TAMSULOSIN HYDROCHLORIDE 0.4 MILLIGRAM(S): 0.4 CAPSULE ORAL at 21:54

## 2025-02-16 RX ADMIN — Medication 81 MILLIGRAM(S): at 13:38

## 2025-02-16 RX ADMIN — INSULIN GLARGINE-YFGN 32 UNIT(S): 100 INJECTION, SOLUTION SUBCUTANEOUS at 21:55

## 2025-02-16 RX ADMIN — RIVAROXABAN 15 MILLIGRAM(S): 10 TABLET, FILM COATED ORAL at 18:00

## 2025-02-16 RX ADMIN — Medication 1 DOSE(S): at 05:19

## 2025-02-16 RX ADMIN — Medication 1 SPRAY(S): at 05:19

## 2025-02-16 RX ADMIN — INSULIN LISPRO 4: 100 INJECTION, SOLUTION INTRAVENOUS; SUBCUTANEOUS at 13:36

## 2025-02-16 RX ADMIN — Medication 10 MILLIGRAM(S): at 21:55

## 2025-02-16 RX ADMIN — Medication 1 DOSE(S): at 17:59

## 2025-02-16 RX ADMIN — Medication 1 SPRAY(S): at 17:58

## 2025-02-16 RX ADMIN — Medication 10 MILLIGRAM(S): at 05:19

## 2025-02-16 RX ADMIN — INSULIN LISPRO 3 UNIT(S): 100 INJECTION, SOLUTION INTRAVENOUS; SUBCUTANEOUS at 09:31

## 2025-02-16 RX ADMIN — BUMETANIDE 5 MG/HR: 1 TABLET ORAL at 17:57

## 2025-02-16 RX ADMIN — ISOSORBDIE DINITRATE 10 MILLIGRAM(S): 30 TABLET ORAL at 09:58

## 2025-02-16 RX ADMIN — INSULIN LISPRO 3 UNIT(S): 100 INJECTION, SOLUTION INTRAVENOUS; SUBCUTANEOUS at 09:59

## 2025-02-16 RX ADMIN — POLYETHYLENE GLYCOL 3350 17 GRAM(S): 17 POWDER, FOR SOLUTION ORAL at 17:59

## 2025-02-16 RX ADMIN — ATORVASTATIN CALCIUM 20 MILLIGRAM(S): 80 TABLET, FILM COATED ORAL at 21:55

## 2025-02-16 RX ADMIN — ISOSORBDIE DINITRATE 10 MILLIGRAM(S): 30 TABLET ORAL at 09:31

## 2025-02-16 RX ADMIN — TIOTROPIUM BROMIDE INHALATION SPRAY 2 PUFF(S): 3.12 SPRAY, METERED RESPIRATORY (INHALATION) at 13:37

## 2025-02-16 RX ADMIN — INSULIN LISPRO 3 UNIT(S): 100 INJECTION, SOLUTION INTRAVENOUS; SUBCUTANEOUS at 18:02

## 2025-02-16 RX ADMIN — INSULIN LISPRO 3 UNIT(S): 100 INJECTION, SOLUTION INTRAVENOUS; SUBCUTANEOUS at 13:36

## 2025-02-16 RX ADMIN — INSULIN LISPRO 4: 100 INJECTION, SOLUTION INTRAVENOUS; SUBCUTANEOUS at 18:01

## 2025-02-16 RX ADMIN — Medication 10 MILLIGRAM(S): at 13:38

## 2025-02-16 NOTE — PROGRESS NOTE ADULT - SUBJECTIVE AND OBJECTIVE BOX
[facility] NEPHROLOGY SERVICES, [facility]  NEPHROLOGY AND HYPERTENSION  [address]  [address]  [place] [address]  [phone]    MD DANIEL GARRETT MD YELENA ROSENBERG, MD BINNY KOSHY, MD CHRISTOPHER CAPUTO, MD EDWARD BOVER, MD          Patient events noted  No distress      MEDICATIONS  (STANDING):  acetaZOLAMIDE Injectable 500 milliGRAM(s) IV Push <User Schedule>  aspirin enteric coated 81 milliGRAM(s) Oral daily  atorvastatin 20 milliGRAM(s) Oral at bedtime  buMETAnide Infusion 1 mG/Hr (5 mL/Hr) IV Continuous <Continuous>  dextrose 5%. 1000 milliLiter(s) (50 mL/Hr) IV Continuous <Continuous>  dextrose 5%. 1000 milliLiter(s) (100 mL/Hr) IV Continuous <Continuous>  dextrose 50% Injectable 25 Gram(s) IV Push once  dextrose 50% Injectable 12.5 Gram(s) IV Push once  dextrose 50% Injectable 25 Gram(s) IV Push once  fluticasone propionate/ salmeterol 250-50 MICROgram(s) Diskus 1 Dose(s) Inhalation two times a day  glucagon  Injectable 1 milliGRAM(s) IntraMuscular once  hydrALAZINE 10 milliGRAM(s) Oral three times a day  insulin glargine Injectable (LANTUS) 32 Unit(s) SubCutaneous at bedtime  insulin lispro (ADMELOG) corrective regimen sliding scale   SubCutaneous three times a day before meals  insulin lispro Injectable (ADMELOG) 3 Unit(s) SubCutaneous three times a day before meals  isosorbide   dinitrate Tablet (ISORDIL) 10 milliGRAM(s) Oral three times a day  metoprolol succinate ER 50 milliGRAM(s) Oral daily  polyethylene glycol 3350 17 Gram(s) Oral two times a day  rivaroxaban 15 milliGRAM(s) Oral with dinner  sodium chloride 0.65% Nasal 1 Spray(s) Both Nostrils two times a day  tamsulosin 0.4 milliGRAM(s) Oral at bedtime  tiotropium 2.5 MICROgram(s) Inhaler 2 Puff(s) Inhalation daily    MEDICATIONS  (PRN):  acetaminophen     Tablet .. 650 milliGRAM(s) Oral every 6 hours PRN Temp greater or equal to 38C (100.4F), Mild Pain (1 - 3)  albuterol/ipratropium for Nebulization 3 milliLiter(s) Nebulizer every 6 hours PRN Shortness of Breath and/or Wheezing  dextrose Oral Gel 15 Gram(s) Oral once PRN Blood Glucose LESS THAN 70 milliGRAM(s)/deciliter  melatonin 3 milliGRAM(s) Oral at bedtime PRN Insomnia      02-15-25 @ 07:01  -  02-16-25 @ 07:00  --------------------------------------------------------  IN: 880 mL / OUT: 1500 mL / NET: -620 mL    02-16-25 @ 07:01  -  02-16-25 @ 21:53  --------------------------------------------------------  IN: 590 mL / OUT: 1150 mL / NET: -560 mL      PHYSICAL EXAM:      T(C): 36.6 (02-16-25 @ 20:49), Max: 36.8 (02-15-25 @ 23:43)  HR: 70 (02-16-25 @ 20:49) (69 - 92)  BP: 111/64 (02-16-25 @ 20:49) (110/68 - 130/80)  RR: 18 (02-16-25 @ 20:49) (18 - 18)  SpO2: 98% (02-16-25 @ 20:49) (95% - 100%)  Wt(kg): --  Lungs clear  Heart S1S2  Abd soft NT ND  Extremities:   1  edema                02-16    143  |  98  |  74[H]  ----------------------------<  183[H]  4.2   |  29  |  2.02[H]    Ca    9.2      16 Feb 2025 12:15  Phos  4.3     02-16  Mg     2.6     02-16    TPro  7.9  /  Alb  3.3  /  TBili  0.9  /  DBili  x   /  AST  43[H]  /  ALT  27  /  AlkPhos  327[H]  02-16      LIVER FUNCTIONS - ( 16 Feb 2025 12:15 )  Alb: 3.3 g/dL / Pro: 7.9 g/dL / ALK PHOS: 327 U/L / ALT: 27 U/L / AST: 43 U/L / GGT: x           Creatinine Trend: 2.02<--, 2.22<--, 2.22<--, 2.29<--, 2.32<--, 2.59<--        Assessment    CM, EF ~ 30%, severe TR  Adm to [facility] VS 1/10 w/fluid overload   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 - 2.5)    Plan  Diuresis per HF team  F/u BMP, Mg, UO  Avoid nephrotoxins as able  Avoid hypotension     Jon Rowe MD

## 2025-02-16 NOTE — PROGRESS NOTE ADULT - PROBLEM SELECTOR PLAN 1
EF 30% with severe tricuspid regurg and PASP 55mmHG on 2/7/24  - Diuretic:  bumex gtt 1mg/hr, Diamox back on, continuing   - BB:  Metoprolol succinate 50mg day  - ARNI/ACE-I/ARB: Ramipril 2.5mg daily - hold given DENISHA on CKD  - Hydralazine/Nitrate: Add hydral 10mg TID. Isodril 10mg TID   - MRA: Was on spironolactone last year, will reintroduce pending denisha  - SGLT2: none   dry wt is 243 lbs  - episode of wct overnight - check lytes, replete as needed

## 2025-02-16 NOTE — PROGRESS NOTE ADULT - ASSESSMENT
78 y/o male w/ PMHx CAD s/p CABG, HF (combined HFrEF [EF 25%] and HFpEF [G3DD]) w/ ICD, AFib on Xarelto, severe AS s/p TAVR, COPD on 3-4L home O2, CKD4, HTN, HLD, T2DM, and BPH who presents as a transfer from [facility] for HF evaluation. Ongoing CHF exacerbation on bumex gtt.

## 2025-02-16 NOTE — PROGRESS NOTE ADULT - SUBJECTIVE AND OBJECTIVE BOX
Brown Bowie MD  [facility] Division of Hospital Medicine    SUBJECTIVE / OVERNIGHT EVENTS:  - no events overnight, no n/v/abd pain/cough/chest pain. sleepy. still overloaded     MEDICATIONS  (STANDING):  acetaZOLAMIDE Injectable 500 milliGRAM(s) IV Push <User Schedule>  aspirin enteric coated 81 milliGRAM(s) Oral daily  atorvastatin 20 milliGRAM(s) Oral at bedtime  buMETAnide Infusion 1 mG/Hr (5 mL/Hr) IV Continuous <Continuous>  dextrose 5%. 1000 milliLiter(s) (100 mL/Hr) IV Continuous <Continuous>  dextrose 5%. 1000 milliLiter(s) (50 mL/Hr) IV Continuous <Continuous>  dextrose 50% Injectable 25 Gram(s) IV Push once  dextrose 50% Injectable 12.5 Gram(s) IV Push once  dextrose 50% Injectable 25 Gram(s) IV Push once  fluticasone propionate/ salmeterol 250-50 MICROgram(s) Diskus 1 Dose(s) Inhalation two times a day  glucagon  Injectable 1 milliGRAM(s) IntraMuscular once  hydrALAZINE 10 milliGRAM(s) Oral three times a day  insulin glargine Injectable (LANTUS) 32 Unit(s) SubCutaneous at bedtime  insulin lispro (ADMELOG) corrective regimen sliding scale   SubCutaneous three times a day before meals  insulin lispro Injectable (ADMELOG) 3 Unit(s) SubCutaneous three times a day before meals  isosorbide   dinitrate Tablet (ISORDIL) 10 milliGRAM(s) Oral three times a day  metoprolol succinate ER 50 milliGRAM(s) Oral daily  polyethylene glycol 3350 17 Gram(s) Oral two times a day  rivaroxaban 15 milliGRAM(s) Oral with dinner  sodium chloride 0.65% Nasal 1 Spray(s) Both Nostrils two times a day  tamsulosin 0.4 milliGRAM(s) Oral at bedtime  tiotropium 2.5 MICROgram(s) Inhaler 2 Puff(s) Inhalation daily    MEDICATIONS  (PRN):  acetaminophen     Tablet .. 650 milliGRAM(s) Oral every 6 hours PRN Temp greater or equal to 38C (100.4F), Mild Pain (1 - 3)  albuterol/ipratropium for Nebulization 3 milliLiter(s) Nebulizer every 6 hours PRN Shortness of Breath and/or Wheezing  dextrose Oral Gel 15 Gram(s) Oral once PRN Blood Glucose LESS THAN 70 milliGRAM(s)/deciliter  melatonin 3 milliGRAM(s) Oral at bedtime PRN Insomnia      I&O's Summary    15 Feb 2025 07:01  -  16 Feb 2025 07:00  --------------------------------------------------------  IN: 880 mL / OUT: 1500 mL / NET: -620 mL    16 Feb 2025 07:01  -  16 Feb 2025 12:28  --------------------------------------------------------  IN: 240 mL / OUT: 0 mL / NET: 240 mL        PHYSICAL EXAM:  Vital Signs Last 24 Hrs  T(C): 36.7 (16 Feb 2025 12:02), Max: 36.8 (15 Feb 2025 13:11)  T(F): 98 (16 Feb 2025 12:02), Max: 98.2 (15 Feb 2025 13:11)  HR: 70 (16 Feb 2025 12:02) (69 - 92)  BP: 129/76 (16 Feb 2025 12:02) (103/58 - 130/80)  BP(mean): --  RR: 18 (16 Feb 2025 12:02) (18 - 18)  SpO2: 100% (16 Feb 2025 12:02) (95% - 100%)    Parameters below as of 16 Feb 2025 12:02  Patient On (Oxygen Delivery Method): nasal cannula      CONSTITUTIONAL: NAD  EYES: PERRLA; conjunctiva and sclera clear  RESPIRATORY: Normal respiratory effort; CTAB  CARDIOVASCULAR: RRR, no JVD, minimal edema s/p wrapping  ABDOMEN: Nontender to palpation, normoactive BS, no guarding/rigidity  MUSCLOSKELETAL:  no clubbing/cyanosis, no joint swelling or tenderness to palpation  PSYCH: A+O x 3, affect normal  SKIN: No rashes; no palpable lesions    LABS:    02-15    140  |  94[L]  |  70[H]  ----------------------------<  228[H]  3.8   |  31  |  2.22[H]    Ca    9.3      15 Feb 2025 17:44  Mg     2.5     02-15            Urinalysis Basic - ( 15 Feb 2025 17:44 )    Color: x / Appearance: x / SG: x / pH: x  Gluc: 228 mg/dL / Ketone: x  / Bili: x / Urobili: x   Blood: x / Protein: x / Nitrite: x   Leuk Esterase: x / RBC: x / WBC x   Sq Epi: x / Non Sq Epi: x / Bacteria: x        SARS-CoV-2: NotDetec (10 Tay 2025 19:29)

## 2025-02-17 LAB
ALBUMIN SERPL ELPH-MCNC: 3.4 G/DL — SIGNIFICANT CHANGE UP (ref 3.3–5)
ALP SERPL-CCNC: 303 U/L — HIGH (ref 40–120)
ALT FLD-CCNC: 25 U/L — SIGNIFICANT CHANGE UP (ref 10–45)
ANION GAP SERPL CALC-SCNC: 11 MMOL/L — SIGNIFICANT CHANGE UP (ref 5–17)
ANION GAP SERPL CALC-SCNC: 12 MMOL/L — SIGNIFICANT CHANGE UP (ref 5–17)
AST SERPL-CCNC: 43 U/L — HIGH (ref 10–40)
BASOPHILS # BLD AUTO: 0.03 K/UL — SIGNIFICANT CHANGE UP (ref 0–0.2)
BASOPHILS NFR BLD AUTO: 0.3 % — SIGNIFICANT CHANGE UP (ref 0–2)
BILIRUB SERPL-MCNC: 0.7 MG/DL — SIGNIFICANT CHANGE UP (ref 0.2–1.2)
BUN SERPL-MCNC: 62 MG/DL — HIGH (ref 7–23)
BUN SERPL-MCNC: 66 MG/DL — HIGH (ref 7–23)
CALCIUM SERPL-MCNC: 9.2 MG/DL — SIGNIFICANT CHANGE UP (ref 8.4–10.5)
CALCIUM SERPL-MCNC: 9.3 MG/DL — SIGNIFICANT CHANGE UP (ref 8.4–10.5)
CHLORIDE SERPL-SCNC: 96 MMOL/L — SIGNIFICANT CHANGE UP (ref 96–108)
CHLORIDE SERPL-SCNC: 98 MMOL/L — SIGNIFICANT CHANGE UP (ref 96–108)
CO2 SERPL-SCNC: 30 MMOL/L — SIGNIFICANT CHANGE UP (ref 22–31)
CO2 SERPL-SCNC: 33 MMOL/L — HIGH (ref 22–31)
CREAT SERPL-MCNC: 1.79 MG/DL — HIGH (ref 0.5–1.3)
CREAT SERPL-MCNC: 1.83 MG/DL — HIGH (ref 0.5–1.3)
EGFR: 37 ML/MIN/1.73M2 — LOW
EGFR: 37 ML/MIN/1.73M2 — LOW
EGFR: 38 ML/MIN/1.73M2 — LOW
EGFR: 38 ML/MIN/1.73M2 — LOW
EOSINOPHIL # BLD AUTO: 0.22 K/UL — SIGNIFICANT CHANGE UP (ref 0–0.5)
EOSINOPHIL NFR BLD AUTO: 2 % — SIGNIFICANT CHANGE UP (ref 0–6)
GLUCOSE BLDC GLUCOMTR-MCNC: 129 MG/DL — HIGH (ref 70–99)
GLUCOSE BLDC GLUCOMTR-MCNC: 165 MG/DL — HIGH (ref 70–99)
GLUCOSE BLDC GLUCOMTR-MCNC: 167 MG/DL — HIGH (ref 70–99)
GLUCOSE BLDC GLUCOMTR-MCNC: 186 MG/DL — HIGH (ref 70–99)
GLUCOSE SERPL-MCNC: 180 MG/DL — HIGH (ref 70–99)
GLUCOSE SERPL-MCNC: 198 MG/DL — HIGH (ref 70–99)
HCT VFR BLD CALC: 34.4 % — LOW (ref 39–50)
HGB BLD-MCNC: 9.4 G/DL — LOW (ref 13–17)
IMM GRANULOCYTES NFR BLD AUTO: 0.5 % — SIGNIFICANT CHANGE UP (ref 0–0.9)
LYMPHOCYTES # BLD AUTO: 0.87 K/UL — LOW (ref 1–3.3)
LYMPHOCYTES # BLD AUTO: 7.7 % — LOW (ref 13–44)
MAGNESIUM SERPL-MCNC: 2.5 MG/DL — SIGNIFICANT CHANGE UP (ref 1.6–2.6)
MAGNESIUM SERPL-MCNC: 2.5 MG/DL — SIGNIFICANT CHANGE UP (ref 1.6–2.6)
MCHC RBC-ENTMCNC: 25.1 PG — LOW (ref 27–34)
MCHC RBC-ENTMCNC: 27.3 G/DL — LOW (ref 32–36)
MCV RBC AUTO: 92 FL — SIGNIFICANT CHANGE UP (ref 80–100)
MONOCYTES # BLD AUTO: 0.97 K/UL — HIGH (ref 0–0.9)
MONOCYTES NFR BLD AUTO: 8.6 % — SIGNIFICANT CHANGE UP (ref 2–14)
NEUTROPHILS # BLD AUTO: 9.11 K/UL — HIGH (ref 1.8–7.4)
NEUTROPHILS NFR BLD AUTO: 80.9 % — HIGH (ref 43–77)
NRBC BLD AUTO-RTO: 0 /100 WBCS — SIGNIFICANT CHANGE UP (ref 0–0)
PHOSPHATE SERPL-MCNC: 4.7 MG/DL — HIGH (ref 2.5–4.5)
PLATELET # BLD AUTO: 174 K/UL — SIGNIFICANT CHANGE UP (ref 150–400)
POTASSIUM SERPL-MCNC: 3.9 MMOL/L — SIGNIFICANT CHANGE UP (ref 3.5–5.3)
POTASSIUM SERPL-MCNC: 3.9 MMOL/L — SIGNIFICANT CHANGE UP (ref 3.5–5.3)
POTASSIUM SERPL-SCNC: 3.9 MMOL/L — SIGNIFICANT CHANGE UP (ref 3.5–5.3)
POTASSIUM SERPL-SCNC: 3.9 MMOL/L — SIGNIFICANT CHANGE UP (ref 3.5–5.3)
PROT SERPL-MCNC: 7.5 G/DL — SIGNIFICANT CHANGE UP (ref 6–8.3)
RBC # BLD: 3.74 M/UL — LOW (ref 4.2–5.8)
RBC # FLD: 23.2 % — HIGH (ref 10.3–14.5)
SODIUM SERPL-SCNC: 140 MMOL/L — SIGNIFICANT CHANGE UP (ref 135–145)
SODIUM SERPL-SCNC: 140 MMOL/L — SIGNIFICANT CHANGE UP (ref 135–145)
WBC # BLD: 11.26 K/UL — HIGH (ref 3.8–10.5)
WBC # FLD AUTO: 11.26 K/UL — HIGH (ref 3.8–10.5)

## 2025-02-17 PROCEDURE — 99233 SBSQ HOSP IP/OBS HIGH 50: CPT

## 2025-02-17 RX ORDER — TORSEMIDE 10 MG
20 TABLET ORAL EVERY 12 HOURS
Refills: 0 | Status: DISCONTINUED | OUTPATIENT
Start: 2025-02-18 | End: 2025-02-18

## 2025-02-17 RX ORDER — SENNA 187 MG
2 TABLET ORAL AT BEDTIME
Refills: 0 | Status: DISCONTINUED | OUTPATIENT
Start: 2025-02-17 | End: 2025-02-23

## 2025-02-17 RX ADMIN — Medication 81 MILLIGRAM(S): at 15:00

## 2025-02-17 RX ADMIN — Medication 1 DOSE(S): at 17:22

## 2025-02-17 RX ADMIN — INSULIN GLARGINE-YFGN 32 UNIT(S): 100 INJECTION, SOLUTION SUBCUTANEOUS at 22:17

## 2025-02-17 RX ADMIN — INSULIN LISPRO 2: 100 INJECTION, SOLUTION INTRAVENOUS; SUBCUTANEOUS at 12:55

## 2025-02-17 RX ADMIN — ISOSORBDIE DINITRATE 10 MILLIGRAM(S): 30 TABLET ORAL at 05:48

## 2025-02-17 RX ADMIN — Medication 10 MILLIGRAM(S): at 14:59

## 2025-02-17 RX ADMIN — ISOSORBDIE DINITRATE 10 MILLIGRAM(S): 30 TABLET ORAL at 16:19

## 2025-02-17 RX ADMIN — POLYETHYLENE GLYCOL 3350 17 GRAM(S): 17 POWDER, FOR SOLUTION ORAL at 17:20

## 2025-02-17 RX ADMIN — Medication 10 MILLIGRAM(S): at 05:47

## 2025-02-17 RX ADMIN — Medication 2 TABLET(S): at 22:17

## 2025-02-17 RX ADMIN — TAMSULOSIN HYDROCHLORIDE 0.4 MILLIGRAM(S): 0.4 CAPSULE ORAL at 22:17

## 2025-02-17 RX ADMIN — ATORVASTATIN CALCIUM 20 MILLIGRAM(S): 80 TABLET, FILM COATED ORAL at 22:18

## 2025-02-17 RX ADMIN — ACETAZOLAMIDE 500 MILLIGRAM(S): 250 TABLET ORAL at 17:20

## 2025-02-17 RX ADMIN — Medication 1 SPRAY(S): at 05:47

## 2025-02-17 RX ADMIN — INSULIN LISPRO 3 UNIT(S): 100 INJECTION, SOLUTION INTRAVENOUS; SUBCUTANEOUS at 12:56

## 2025-02-17 RX ADMIN — INSULIN LISPRO 2: 100 INJECTION, SOLUTION INTRAVENOUS; SUBCUTANEOUS at 17:21

## 2025-02-17 RX ADMIN — POLYETHYLENE GLYCOL 3350 17 GRAM(S): 17 POWDER, FOR SOLUTION ORAL at 05:49

## 2025-02-17 RX ADMIN — ISOSORBDIE DINITRATE 10 MILLIGRAM(S): 30 TABLET ORAL at 11:32

## 2025-02-17 RX ADMIN — INSULIN LISPRO 3 UNIT(S): 100 INJECTION, SOLUTION INTRAVENOUS; SUBCUTANEOUS at 09:00

## 2025-02-17 RX ADMIN — BUMETANIDE 5 MG/HR: 1 TABLET ORAL at 19:45

## 2025-02-17 RX ADMIN — INSULIN LISPRO 3 UNIT(S): 100 INJECTION, SOLUTION INTRAVENOUS; SUBCUTANEOUS at 17:21

## 2025-02-17 RX ADMIN — Medication 1 DOSE(S): at 05:46

## 2025-02-17 RX ADMIN — Medication 10 MILLIGRAM(S): at 22:17

## 2025-02-17 RX ADMIN — TIOTROPIUM BROMIDE INHALATION SPRAY 2 PUFF(S): 3.12 SPRAY, METERED RESPIRATORY (INHALATION) at 11:32

## 2025-02-17 RX ADMIN — METOPROLOL SUCCINATE 50 MILLIGRAM(S): 50 TABLET, EXTENDED RELEASE ORAL at 05:47

## 2025-02-17 RX ADMIN — RIVAROXABAN 15 MILLIGRAM(S): 10 TABLET, FILM COATED ORAL at 17:22

## 2025-02-17 RX ADMIN — Medication 1 SPRAY(S): at 17:20

## 2025-02-17 NOTE — PROGRESS NOTE ADULT - ASSESSMENT
80 y/o male w/ PMHx CAD s/p CABG, HFrEF (LVIDd 5.2, LVEF 25-30%) w/ DC- ICD, AFib (Xarelto), severe AS s/p TAVR, COPD on 3-4L home O2, CKD3, HTN, HLD, T2DM, and BPH presented to [facility] for ADHF, despite escalation of home diuretics. Now transferred to [facility] for refractory volume overload. His labs notable for stable Scr (though unclear baseline).  Repeat TTE shows severe BiV dysfunction. s/p RRT for hypotension given 500cc bolus on 2/11    While on floor he has been diuresing on Bumex gtts augmented with Diamox, making good UOP though difficult to accurately quantify giving incontinence. But overall volume status is improving he is near euvolemic and renal chemistries have been stable. GOC clarified and he remains DNR/DNI per wife - wants the ICD on.     Cardiac Studies  TTE 2/7/25 LVIDd 5.3cm, LVEF 30%, no LV thrombus, TAPSE 1.0cm, severely enlarged RV size/RVSF reduced, mod dilated LA, severe dilated RA, TAVR present, mod MR, severe TR, PASP 52, PASP 52, no pericardial effusion, IVC dilated 2.35cm  TTE 8/8/24; LVIDd 5.2, EF 25-30%, global hypokinesis, severe grade III DD, TASPE 1.1, TAVR present in AV position, mld-mod MR, mod TR, PASP 53 80 y/o male w/ PMHx CAD s/p CABG, HFrEF (LVIDd 5.2, LVEF 25-30%) w/ DC- ICD, AFib (Xarelto), severe AS s/p TAVR, COPD on 3-4L home O2, CKD3, HTN, HLD, T2DM, and BPH presented to [facility] for ADHF, despite escalation of home diuretics. Now transferred to [facility] for refractory volume overload. His labs notable for stable Scr (though unclear baseline).  Repeat TTE shows severe BiV dysfunction. s/p RRT for hypotension given 500cc bolus on 2/11    GOC clarified and he remains DNR/DNI per wife - wants the ICD on. He was being diuresed well with bumex drip augmented with diamox. POCUS eval of IVC showed normal size with respiratory variability appears more euvolemic. Plan of care is to transition to oral diuretics.     Cardiac Studies  TTE 2/7/25 LVIDd 5.3cm, LVEF 30%, no LV thrombus, TAPSE 1.0cm, severely enlarged RV size/RVSF reduced, mod dilated LA, severe dilated RA, TAVR present, mod MR, severe TR, PASP 52, PASP 52, no pericardial effusion, IVC dilated 2.35cm  TTE 8/8/24; LVIDd 5.2, EF 25-30%, global hypokinesis, severe grade III DD, TASPE 1.1, TAVR present in AV position, mld-mod MR, mod TR, PASP 53

## 2025-02-17 NOTE — PROGRESS NOTE ADULT - ATTENDING COMMENTS
79 years old with history of CAD status post CABG, HFrEF with LVEF 25 to 30% status post ICD, severe AS status post TAVR, initially admitted to [facility] for decompensated heart failure and then transferred to [facility] on Feb 5th due to refractory volume overload.  TTE showed biventricular dysfunction.  He has been getting diuresis with Bumex drip with acceptable response and he now appears close to euvolemic.  POCUS assessment shows IVC of 2.1 cm with some collapsibility.  The patient is DNR/DNI    – Continue Bumex drip until midnight then stop  – Start torsemide 20 mg twice daily starting on 2/18  – Continue hydralazine and isosorbide dinitrate for afterload reduction  – Monitor strict I's and O's, daily weight  – Correct electrolytes  – Get iron profile and correct as necessary  – PT follow-up

## 2025-02-17 NOTE — PROGRESS NOTE ADULT - PROBLEM SELECTOR PLAN 1
EF 30% with severe tricuspid regurg and PASP 55mmHG on 2/7/24  - Diuretic:  bumex gtt 1mg/he -> to be dc'd at 1159 2/17 and start torsemide 2/18 in AM per HF notes  - Diamox back on, continuing   - BB:  Metoprolol succinate 50mg day  - ARNI/ACE-I/ARB: Ramipril 2.5mg daily - hold given DENISHA on CKD  - Hydralazine/Nitrate: Add hydral 10mg TID. Isodril 10mg TID   - MRA: Was on spironolactone last year, will reintroduce pending denisha  - SGLT2: none   dry wt is 243 lbs

## 2025-02-17 NOTE — PROGRESS NOTE ADULT - PROBLEM SELECTOR PLAN 1
: Etiology likely ischemic. Primary cardiologist Dr. Jewel Stubbs ([facility]), last TTE LVEF 25-30%. Appears has been on some GDMT as outpt (?Farxiga, Coreg and previously on ARNI though unclear why stopped)  - Increase Toprol XL to 50mg QD from 25mg: Please stagger BB from afterload agents. Given in afternoon  - Will defer ARB/ARNI/SGLT2I/MRA for now given degree of renal dysfunction.  - Afterload: c/w ISDN 10mg TID and HDZN 10mg TID with hold parameter <90 SBP  - Diuretics: c/w bumex gtt 1mg/hr until midnight: Start torsemide PO 20mg BID starting 2/18 AM  - Diamox 500mg QD today; Will re-assess volume status  - Has ICD in place; Currently a DNR/DNI: trial NIV. Discussed with palliative and at this time wants ICD on as per wife  - Trend twice daily BMP, LFTs, while on continuous loop diuretics  - Aggressive PT  - Daily Standing weight with PT   - Strict I/O's  - Target electrolyte repletion to target K+ >4.0 and Mg >2.0. : Etiology likely ischemic. Primary cardiologist Dr. Jewel Stubbs ([facility]), last TTE LVEF 25-30%. Appears has been on some GDMT as outpt (?Farxiga, Coreg and previously on ARNI though unclear why stopped)  - Toprol XL to 50mg QD   - Will defer ARB/ARNI/SGLT2I/MRA for now given degree of renal dysfunction.  - Afterload: c/w ISDN 10mg TID and HDZN 10mg TID with hold parameter <90 SBP  - Diuretics: c/w bumex gtt 1mg/hr until midnight: Start torsemide PO 20mg BID starting 2/18 AM  - Has ICD in place; Currently a DNR/DNI: trial NIV. Discussed with palliative and at this time wants ICD on as per wife  - Trend twice daily BMP, LFTs, while on continuous loop diuretics  - Aggressive PT  - Daily Standing weight with PT   - Strict I/O's  - Target electrolyte repletion to target K+ >4.0 and Mg >2.0.

## 2025-02-17 NOTE — PROGRESS NOTE ADULT - ASSESSMENT
78 y/o male w/ PMHx CAD s/p CABG, HF (combined HFrEF [EF 25%] and HFpEF [G3DD]) w/ ICD, AFib on Xarelto, severe AS s/p TAVR, COPD on 3-4L home O2, CKD4, HTN, HLD, T2DM, and BPH who presents as a transfer from [facility] for HF evaluation. Ongoing CHF exacerbation on bumex gtt.

## 2025-02-17 NOTE — PROGRESS NOTE ADULT - SUBJECTIVE AND OBJECTIVE BOX
Interval Events/Subjective    Patient seen and examined at bedside.  No chest pain, shortness of breath, or palpitations            Telemetry review:     Current Meds:  acetaminophen     Tablet .. 650 milliGRAM(s) Oral every 6 hours PRN  acetaZOLAMIDE Injectable 500 milliGRAM(s) IV Push <User Schedule>  albuterol/ipratropium for Nebulization 3 milliLiter(s) Nebulizer every 6 hours PRN  aspirin enteric coated 81 milliGRAM(s) Oral daily  atorvastatin 20 milliGRAM(s) Oral at bedtime  buMETAnide Infusion 1 mG/Hr IV Continuous <Continuous>  dextrose 5%. 1000 milliLiter(s) IV Continuous <Continuous>  dextrose 5%. 1000 milliLiter(s) IV Continuous <Continuous>  dextrose 50% Injectable 25 Gram(s) IV Push once  dextrose 50% Injectable 12.5 Gram(s) IV Push once  dextrose 50% Injectable 25 Gram(s) IV Push once  dextrose Oral Gel 15 Gram(s) Oral once PRN  fluticasone propionate/ salmeterol 250-50 MICROgram(s) Diskus 1 Dose(s) Inhalation two times a day  glucagon  Injectable 1 milliGRAM(s) IntraMuscular once  hydrALAZINE 10 milliGRAM(s) Oral three times a day  insulin glargine Injectable (LANTUS) 32 Unit(s) SubCutaneous at bedtime  insulin lispro (ADMELOG) corrective regimen sliding scale   SubCutaneous three times a day before meals  insulin lispro Injectable (ADMELOG) 3 Unit(s) SubCutaneous three times a day before meals  isosorbide   dinitrate Tablet (ISORDIL) 10 milliGRAM(s) Oral three times a day  melatonin 3 milliGRAM(s) Oral at bedtime PRN  metoprolol succinate ER 50 milliGRAM(s) Oral daily  polyethylene glycol 3350 17 Gram(s) Oral two times a day  rivaroxaban 15 milliGRAM(s) Oral with dinner  sodium chloride 0.65% Nasal 1 Spray(s) Both Nostrils two times a day  tamsulosin 0.4 milliGRAM(s) Oral at bedtime  tiotropium 2.5 MICROgram(s) Inhaler 2 Puff(s) Inhalation daily      Vitals:  T(F): 97.7 (02-16), Max: 98 (02-16)  HR: 70 (02-17) (69 - 110)  BP: 120/73 (02-17) (111/64 - 129/76)  RR: 18 (02-17)  SpO2: 100% (02-17)  I&O's Summary    16 Feb 2025 07:01  -  17 Feb 2025 07:00  --------------------------------------------------------  IN: 590 mL / OUT: 1825 mL / NET: -1235 mL      Physical Exam:  GENERAL: NAD  HEAD:  Atraumatic, Normocephalic.  NECK: Supple, No JVD.  CHEST/LUNG: Clear to auscultation bilaterally; No rales, rhonchi, wheezing, or rubs. Speaking in full sentences  HEART: Regular rate and rhythm; No murmurs, rubs, or gallops.  ABDOMEN: Bowel sounds present; Soft, Nontender, Nondistended.   EXTREMITIES:  2+ Peripheral Pulses, brisk capillary refill. No clubbing, cyanosis, or edema.      02-16    143  |  98  |  74[H]  ----------------------------<  183[H]  4.2   |  29  |  2.02[H]    Ca    9.2      16 Feb 2025 12:15  Phos  4.3     02-16  Mg     2.6     02-16    TPro  7.9  /  Alb  3.3  /  TBili  0.9  /  DBili  x   /  AST  43[H]  /  ALT  27  /  AlkPhos  327[H]  02-16 Interval Events/Subjective    Patient seen and examined at bedside.  No chest pain, shortness of breath, or palpitations            Current Meds:  acetaminophen     Tablet .. 650 milliGRAM(s) Oral every 6 hours PRN  acetaZOLAMIDE Injectable 500 milliGRAM(s) IV Push <User Schedule>  albuterol/ipratropium for Nebulization 3 milliLiter(s) Nebulizer every 6 hours PRN  aspirin enteric coated 81 milliGRAM(s) Oral daily  atorvastatin 20 milliGRAM(s) Oral at bedtime  buMETAnide Infusion 1 mG/Hr IV Continuous <Continuous>  dextrose 5%. 1000 milliLiter(s) IV Continuous <Continuous>  dextrose 5%. 1000 milliLiter(s) IV Continuous <Continuous>  dextrose 50% Injectable 25 Gram(s) IV Push once  dextrose 50% Injectable 12.5 Gram(s) IV Push once  dextrose 50% Injectable 25 Gram(s) IV Push once  dextrose Oral Gel 15 Gram(s) Oral once PRN  fluticasone propionate/ salmeterol 250-50 MICROgram(s) Diskus 1 Dose(s) Inhalation two times a day  glucagon  Injectable 1 milliGRAM(s) IntraMuscular once  hydrALAZINE 10 milliGRAM(s) Oral three times a day  insulin glargine Injectable (LANTUS) 32 Unit(s) SubCutaneous at bedtime  insulin lispro (ADMELOG) corrective regimen sliding scale   SubCutaneous three times a day before meals  insulin lispro Injectable (ADMELOG) 3 Unit(s) SubCutaneous three times a day before meals  isosorbide   dinitrate Tablet (ISORDIL) 10 milliGRAM(s) Oral three times a day  melatonin 3 milliGRAM(s) Oral at bedtime PRN  metoprolol succinate ER 50 milliGRAM(s) Oral daily  polyethylene glycol 3350 17 Gram(s) Oral two times a day  rivaroxaban 15 milliGRAM(s) Oral with dinner  sodium chloride 0.65% Nasal 1 Spray(s) Both Nostrils two times a day  tamsulosin 0.4 milliGRAM(s) Oral at bedtime  tiotropium 2.5 MICROgram(s) Inhaler 2 Puff(s) Inhalation daily      Vitals:  T(F): 97.7 (02-16), Max: 98 (02-16)  HR: 70 (02-17) (69 - 110)  BP: 120/73 (02-17) (111/64 - 129/76)  RR: 18 (02-17)  SpO2: 100% (02-17)  I&O's Summary    16 Feb 2025 07:01  -  17 Feb 2025 07:00  --------------------------------------------------------  IN: 590 mL / OUT: 1825 mL / NET: -1235 mL      Physical Exam:  GENERAL: NAD  HEAD:  Atraumatic, Normocephalic.  NECK: Supple, No JVD.  CHEST/LUNG: Clear to auscultation bilaterally; No rales, rhonchi, wheezing, or rubs. Speaking in full sentences  HEART: Regular rate and rhythm; No murmurs, rubs, or gallops.  ABDOMEN: Bowel sounds present; Soft, Nontender, Nondistended.   EXTREMITIES:  2+ Peripheral Pulses, brisk capillary refill. No clubbing, cyanosis, or edema.      02-16    143  |  98  |  74[H]  ----------------------------<  183[H]  4.2   |  29  |  2.02[H]    Ca    9.2      16 Feb 2025 12:15  Phos  4.3     02-16  Mg     2.6     02-16    TPro  7.9  /  Alb  3.3  /  TBili  0.9  /  DBili  x   /  AST  43[H]  /  ALT  27  /  AlkPhos  327[H]  02-16

## 2025-02-17 NOTE — PROGRESS NOTE ADULT - SUBJECTIVE AND OBJECTIVE BOX
Brown Bowie MD  [facility] Division of Hospital Medicine    SUBJECTIVE / OVERNIGHT EVENTS:  - no events overnight, no n/v/abd pain/cough/chest pain. seen with wife at bedside.     MEDICATIONS  (STANDING):  acetaZOLAMIDE Injectable 500 milliGRAM(s) IV Push <User Schedule>  aspirin enteric coated 81 milliGRAM(s) Oral daily  atorvastatin 20 milliGRAM(s) Oral at bedtime  buMETAnide Infusion 1 mG/Hr (5 mL/Hr) IV Continuous <Continuous>  dextrose 5%. 1000 milliLiter(s) (50 mL/Hr) IV Continuous <Continuous>  dextrose 5%. 1000 milliLiter(s) (100 mL/Hr) IV Continuous <Continuous>  dextrose 50% Injectable 25 Gram(s) IV Push once  dextrose 50% Injectable 12.5 Gram(s) IV Push once  dextrose 50% Injectable 25 Gram(s) IV Push once  fluticasone propionate/ salmeterol 250-50 MICROgram(s) Diskus 1 Dose(s) Inhalation two times a day  glucagon  Injectable 1 milliGRAM(s) IntraMuscular once  hydrALAZINE 10 milliGRAM(s) Oral three times a day  insulin glargine Injectable (LANTUS) 32 Unit(s) SubCutaneous at bedtime  insulin lispro (ADMELOG) corrective regimen sliding scale   SubCutaneous three times a day before meals  insulin lispro Injectable (ADMELOG) 3 Unit(s) SubCutaneous three times a day before meals  isosorbide   dinitrate Tablet (ISORDIL) 10 milliGRAM(s) Oral three times a day  metoprolol succinate ER 50 milliGRAM(s) Oral daily  polyethylene glycol 3350 17 Gram(s) Oral two times a day  rivaroxaban 15 milliGRAM(s) Oral with dinner  senna 2 Tablet(s) Oral at bedtime  sodium chloride 0.65% Nasal 1 Spray(s) Both Nostrils two times a day  tamsulosin 0.4 milliGRAM(s) Oral at bedtime  tiotropium 2.5 MICROgram(s) Inhaler 2 Puff(s) Inhalation daily    MEDICATIONS  (PRN):  acetaminophen     Tablet .. 650 milliGRAM(s) Oral every 6 hours PRN Temp greater or equal to 38C (100.4F), Mild Pain (1 - 3)  albuterol/ipratropium for Nebulization 3 milliLiter(s) Nebulizer every 6 hours PRN Shortness of Breath and/or Wheezing  dextrose Oral Gel 15 Gram(s) Oral once PRN Blood Glucose LESS THAN 70 milliGRAM(s)/deciliter  melatonin 3 milliGRAM(s) Oral at bedtime PRN Insomnia      I&O's Summary    16 Feb 2025 07:01  -  17 Feb 2025 07:00  --------------------------------------------------------  IN: 590 mL / OUT: 1825 mL / NET: -1235 mL    17 Feb 2025 07:01  -  17 Feb 2025 13:25  --------------------------------------------------------  IN: 180 mL / OUT: 400 mL / NET: -220 mL        PHYSICAL EXAM:  Vital Signs Last 24 Hrs  T(C): 36.4 (17 Feb 2025 11:30), Max: 36.6 (16 Feb 2025 20:49)  T(F): 97.6 (17 Feb 2025 11:30), Max: 97.8 (16 Feb 2025 20:49)  HR: 70 (17 Feb 2025 11:30) (69 - 110)  BP: 114/69 (17 Feb 2025 11:30) (111/64 - 126/75)  BP(mean): --  RR: 18 (17 Feb 2025 11:30) (18 - 18)  SpO2: 96% (17 Feb 2025 11:30) (94% - 100%)    Parameters below as of 17 Feb 2025 11:30  Patient On (Oxygen Delivery Method): nasal cannula      CONSTITUTIONAL: NAD  EYES: PERRLA; conjunctiva and sclera clear  RESPIRATORY: Normal respiratory effort; CTAB  CARDIOVASCULAR: RRR, no JVD, minimal edema s/p wrapping  ABDOMEN: Nontender to palpation, normoactive BS, no guarding/rigidity  MUSCLOSKELETAL:  no clubbing/cyanosis, no joint swelling or tenderness to palpation  PSYCH: A+O x 3, affect normal  SKIN: No rashes; no palpable lesions      LABS:                        9.4    11.26 )-----------( 174      ( 17 Feb 2025 11:13 )             34.4     02-17    140  |  96  |  66[H]  ----------------------------<  198[H]  3.9   |  33[H]  |  1.79[H]    Ca    9.2      17 Feb 2025 11:13  Phos  4.7     02-17  Mg     2.5     02-17    TPro  7.5  /  Alb  3.4  /  TBili  0.7  /  DBili  x   /  AST  43[H]  /  ALT  25  /  AlkPhos  303[H]  02-17          Urinalysis Basic - ( 17 Feb 2025 11:13 )    Color: x / Appearance: x / SG: x / pH: x  Gluc: 198 mg/dL / Ketone: x  / Bili: x / Urobili: x   Blood: x / Protein: x / Nitrite: x   Leuk Esterase: x / RBC: x / WBC x   Sq Epi: x / Non Sq Epi: x / Bacteria: x        SARS-CoV-2: NotDetec (10 Tay 2025 19:29)

## 2025-02-18 LAB
ALBUMIN SERPL ELPH-MCNC: 3.6 G/DL — SIGNIFICANT CHANGE UP (ref 3.3–5)
ALP SERPL-CCNC: 310 U/L — HIGH (ref 40–120)
ALT FLD-CCNC: 21 U/L — SIGNIFICANT CHANGE UP (ref 10–45)
ANION GAP SERPL CALC-SCNC: 14 MMOL/L — SIGNIFICANT CHANGE UP (ref 5–17)
ANION GAP SERPL CALC-SCNC: 14 MMOL/L — SIGNIFICANT CHANGE UP (ref 5–17)
AST SERPL-CCNC: 38 U/L — SIGNIFICANT CHANGE UP (ref 10–40)
BILIRUB SERPL-MCNC: 0.8 MG/DL — SIGNIFICANT CHANGE UP (ref 0.2–1.2)
BUN SERPL-MCNC: 59 MG/DL — HIGH (ref 7–23)
BUN SERPL-MCNC: 68 MG/DL — HIGH (ref 7–23)
CALCIUM SERPL-MCNC: 9.4 MG/DL — SIGNIFICANT CHANGE UP (ref 8.4–10.5)
CALCIUM SERPL-MCNC: 9.7 MG/DL — SIGNIFICANT CHANGE UP (ref 8.4–10.5)
CHLORIDE SERPL-SCNC: 98 MMOL/L — SIGNIFICANT CHANGE UP (ref 96–108)
CHLORIDE SERPL-SCNC: 99 MMOL/L — SIGNIFICANT CHANGE UP (ref 96–108)
CO2 SERPL-SCNC: 30 MMOL/L — SIGNIFICANT CHANGE UP (ref 22–31)
CO2 SERPL-SCNC: 31 MMOL/L — SIGNIFICANT CHANGE UP (ref 22–31)
CREAT SERPL-MCNC: 1.68 MG/DL — HIGH (ref 0.5–1.3)
CREAT SERPL-MCNC: 1.99 MG/DL — HIGH (ref 0.5–1.3)
EGFR: 33 ML/MIN/1.73M2 — LOW
EGFR: 33 ML/MIN/1.73M2 — LOW
EGFR: 41 ML/MIN/1.73M2 — LOW
EGFR: 41 ML/MIN/1.73M2 — LOW
GLUCOSE BLDC GLUCOMTR-MCNC: 114 MG/DL — HIGH (ref 70–99)
GLUCOSE BLDC GLUCOMTR-MCNC: 116 MG/DL — HIGH (ref 70–99)
GLUCOSE BLDC GLUCOMTR-MCNC: 146 MG/DL — HIGH (ref 70–99)
GLUCOSE BLDC GLUCOMTR-MCNC: 150 MG/DL — HIGH (ref 70–99)
GLUCOSE BLDC GLUCOMTR-MCNC: 83 MG/DL — SIGNIFICANT CHANGE UP (ref 70–99)
GLUCOSE SERPL-MCNC: 115 MG/DL — HIGH (ref 70–99)
GLUCOSE SERPL-MCNC: 146 MG/DL — HIGH (ref 70–99)
MAGNESIUM SERPL-MCNC: 2.5 MG/DL — SIGNIFICANT CHANGE UP (ref 1.6–2.6)
PHOSPHATE SERPL-MCNC: 4.4 MG/DL — SIGNIFICANT CHANGE UP (ref 2.5–4.5)
POTASSIUM SERPL-MCNC: 3.6 MMOL/L — SIGNIFICANT CHANGE UP (ref 3.5–5.3)
POTASSIUM SERPL-MCNC: 3.7 MMOL/L — SIGNIFICANT CHANGE UP (ref 3.5–5.3)
POTASSIUM SERPL-SCNC: 3.6 MMOL/L — SIGNIFICANT CHANGE UP (ref 3.5–5.3)
POTASSIUM SERPL-SCNC: 3.7 MMOL/L — SIGNIFICANT CHANGE UP (ref 3.5–5.3)
PROT SERPL-MCNC: 7.8 G/DL — SIGNIFICANT CHANGE UP (ref 6–8.3)
SODIUM SERPL-SCNC: 143 MMOL/L — SIGNIFICANT CHANGE UP (ref 135–145)
SODIUM SERPL-SCNC: 143 MMOL/L — SIGNIFICANT CHANGE UP (ref 135–145)

## 2025-02-18 PROCEDURE — 99233 SBSQ HOSP IP/OBS HIGH 50: CPT

## 2025-02-18 PROCEDURE — 99232 SBSQ HOSP IP/OBS MODERATE 35: CPT

## 2025-02-18 RX ORDER — BUMETANIDE 1 MG/1
4 TABLET ORAL EVERY 8 HOURS
Refills: 0 | Status: DISCONTINUED | OUTPATIENT
Start: 2025-02-18 | End: 2025-02-19

## 2025-02-18 RX ORDER — ACETAZOLAMIDE 250 MG/1
250 TABLET ORAL DAILY
Refills: 0 | Status: DISCONTINUED | OUTPATIENT
Start: 2025-02-18 | End: 2025-02-19

## 2025-02-18 RX ORDER — SPIRONOLACTONE 25 MG
25 TABLET ORAL DAILY
Refills: 0 | Status: DISCONTINUED | OUTPATIENT
Start: 2025-02-18 | End: 2025-02-20

## 2025-02-18 RX ADMIN — INSULIN LISPRO 3 UNIT(S): 100 INJECTION, SOLUTION INTRAVENOUS; SUBCUTANEOUS at 13:18

## 2025-02-18 RX ADMIN — ISOSORBDIE DINITRATE 10 MILLIGRAM(S): 30 TABLET ORAL at 05:08

## 2025-02-18 RX ADMIN — Medication 10 MILLIGRAM(S): at 05:09

## 2025-02-18 RX ADMIN — Medication 2 TABLET(S): at 23:29

## 2025-02-18 RX ADMIN — BUMETANIDE 132 MILLIGRAM(S): 1 TABLET ORAL at 23:29

## 2025-02-18 RX ADMIN — ISOSORBDIE DINITRATE 10 MILLIGRAM(S): 30 TABLET ORAL at 11:37

## 2025-02-18 RX ADMIN — RIVAROXABAN 15 MILLIGRAM(S): 10 TABLET, FILM COATED ORAL at 18:04

## 2025-02-18 RX ADMIN — ISOSORBDIE DINITRATE 10 MILLIGRAM(S): 30 TABLET ORAL at 18:04

## 2025-02-18 RX ADMIN — INSULIN GLARGINE-YFGN 32 UNIT(S): 100 INJECTION, SOLUTION SUBCUTANEOUS at 23:58

## 2025-02-18 RX ADMIN — Medication 10 MILLIGRAM(S): at 23:28

## 2025-02-18 RX ADMIN — Medication 1 SPRAY(S): at 05:07

## 2025-02-18 RX ADMIN — Medication 81 MILLIGRAM(S): at 15:04

## 2025-02-18 RX ADMIN — TIOTROPIUM BROMIDE INHALATION SPRAY 2 PUFF(S): 3.12 SPRAY, METERED RESPIRATORY (INHALATION) at 11:36

## 2025-02-18 RX ADMIN — Medication 1 SPRAY(S): at 18:03

## 2025-02-18 RX ADMIN — POLYETHYLENE GLYCOL 3350 17 GRAM(S): 17 POWDER, FOR SOLUTION ORAL at 18:03

## 2025-02-18 RX ADMIN — TAMSULOSIN HYDROCHLORIDE 0.4 MILLIGRAM(S): 0.4 CAPSULE ORAL at 23:29

## 2025-02-18 RX ADMIN — BUMETANIDE 132 MILLIGRAM(S): 1 TABLET ORAL at 15:11

## 2025-02-18 RX ADMIN — Medication 1 DOSE(S): at 05:08

## 2025-02-18 RX ADMIN — ATORVASTATIN CALCIUM 20 MILLIGRAM(S): 80 TABLET, FILM COATED ORAL at 23:28

## 2025-02-18 RX ADMIN — INSULIN LISPRO 3 UNIT(S): 100 INJECTION, SOLUTION INTRAVENOUS; SUBCUTANEOUS at 08:51

## 2025-02-18 RX ADMIN — INSULIN LISPRO 3 UNIT(S): 100 INJECTION, SOLUTION INTRAVENOUS; SUBCUTANEOUS at 18:05

## 2025-02-18 RX ADMIN — METOPROLOL SUCCINATE 50 MILLIGRAM(S): 50 TABLET, EXTENDED RELEASE ORAL at 05:08

## 2025-02-18 RX ADMIN — POLYETHYLENE GLYCOL 3350 17 GRAM(S): 17 POWDER, FOR SOLUTION ORAL at 05:08

## 2025-02-18 RX ADMIN — Medication 20 MILLIGRAM(S): at 05:08

## 2025-02-18 RX ADMIN — Medication 1 DOSE(S): at 18:03

## 2025-02-18 RX ADMIN — Medication 10 MILLIGRAM(S): at 15:04

## 2025-02-18 NOTE — PROGRESS NOTE ADULT - SUBJECTIVE AND OBJECTIVE BOX
Contreras Brooks MD  Division of Hospital Medicine  Available on MS teams until 7pm  If no response or off-hours, page [phone]  -------------------------------------    Patient is a 79y old  Male who presents with a chief complaint of HF eval (18 Feb 2025 12:24)      SUBJECTIVE / OVERNIGHT EVENTS: none acute  ADDITIONAL REVIEW OF SYSTEMS:     MEDICATIONS  (STANDING):  acetaZOLAMIDE    Tablet 250 milliGRAM(s) Oral daily  aspirin enteric coated 81 milliGRAM(s) Oral daily  atorvastatin 20 milliGRAM(s) Oral at bedtime  buMETAnide IVPB 4 milliGRAM(s) IV Intermittent every 8 hours  dextrose 5%. 1000 milliLiter(s) (50 mL/Hr) IV Continuous <Continuous>  dextrose 5%. 1000 milliLiter(s) (100 mL/Hr) IV Continuous <Continuous>  dextrose 50% Injectable 25 Gram(s) IV Push once  dextrose 50% Injectable 25 Gram(s) IV Push once  dextrose 50% Injectable 12.5 Gram(s) IV Push once  fluticasone propionate/ salmeterol 250-50 MICROgram(s) Diskus 1 Dose(s) Inhalation two times a day  glucagon  Injectable 1 milliGRAM(s) IntraMuscular once  hydrALAZINE 10 milliGRAM(s) Oral three times a day  insulin glargine Injectable (LANTUS) 32 Unit(s) SubCutaneous at bedtime  insulin lispro (ADMELOG) corrective regimen sliding scale   SubCutaneous three times a day before meals  insulin lispro Injectable (ADMELOG) 3 Unit(s) SubCutaneous three times a day before meals  isosorbide   dinitrate Tablet (ISORDIL) 10 milliGRAM(s) Oral three times a day  metoprolol succinate ER 50 milliGRAM(s) Oral daily  polyethylene glycol 3350 17 Gram(s) Oral two times a day  rivaroxaban 15 milliGRAM(s) Oral with dinner  senna 2 Tablet(s) Oral at bedtime  sodium chloride 0.65% Nasal 1 Spray(s) Both Nostrils two times a day  spironolactone 25 milliGRAM(s) Oral daily  tamsulosin 0.4 milliGRAM(s) Oral at bedtime  tiotropium 2.5 MICROgram(s) Inhaler 2 Puff(s) Inhalation daily    MEDICATIONS  (PRN):  acetaminophen     Tablet .. 650 milliGRAM(s) Oral every 6 hours PRN Temp greater or equal to 38C (100.4F), Mild Pain (1 - 3)  albuterol/ipratropium for Nebulization 3 milliLiter(s) Nebulizer every 6 hours PRN Shortness of Breath and/or Wheezing  dextrose Oral Gel 15 Gram(s) Oral once PRN Blood Glucose LESS THAN 70 milliGRAM(s)/deciliter  melatonin 3 milliGRAM(s) Oral at bedtime PRN Insomnia      CAPILLARY BLOOD GLUCOSE      POCT Blood Glucose.: 150 mg/dL (18 Feb 2025 12:43)  POCT Blood Glucose.: 83 mg/dL (18 Feb 2025 08:36)  POCT Blood Glucose.: 165 mg/dL (17 Feb 2025 21:36)  POCT Blood Glucose.: 186 mg/dL (17 Feb 2025 17:19)    I&O's Summary    17 Feb 2025 07:01  -  18 Feb 2025 07:00  --------------------------------------------------------  IN: 600 mL / OUT: 1275 mL / NET: -675 mL        PHYSICAL EXAM:  Vital Signs Last 24 Hrs  T(C): 36.5 (18 Feb 2025 11:37), Max: 36.7 (17 Feb 2025 23:45)  T(F): 97.7 (18 Feb 2025 11:37), Max: 98 (17 Feb 2025 23:45)  HR: 71 (18 Feb 2025 11:37) (69 - 82)  BP: 107/64 (18 Feb 2025 11:37) (101/66 - 132/78)  BP(mean): --  RR: 18 (18 Feb 2025 11:37) (18 - 18)  SpO2: 99% (18 Feb 2025 11:37) (97% - 100%)    Parameters below as of 18 Feb 2025 11:37  Patient On (Oxygen Delivery Method): nasal cannula      CONSTITUTIONAL: NAD  EYES: PERRLA; conjunctiva and sclera clear  ENMT: MMM  NECK: Supple  RESPIRATORY: Normal respiratory effort; CTAB  CARDIOVASCULAR: RRR, no JVD, no peripheral edema   ABDOMEN: Nontender to palpation, normoactive BS, no guarding/rigidity  MUSCLOSKELETAL:  no clubbing/cyanosis, no joint swelling or tenderness to palpation  PSYCH: A+O x 3, affect normal  NEUROLOGY: CN 2-12 are intact and symmetric; no gross sensory or motor deficits  SKIN: No rashes; no palpable lesions    LABS:                        9.4    11.26 )-----------( 174      ( 17 Feb 2025 11:13 )             34.4     02-18    143  |  99  |  59[H]  ----------------------------<  115[H]  3.7   |  30  |  1.68[H]    Ca    9.4      18 Feb 2025 10:55  Phos  4.4     02-18  Mg     2.5     02-18    TPro  7.8  /  Alb  3.6  /  TBili  0.8  /  DBili  x   /  AST  38  /  ALT  21  /  AlkPhos  310[H]  02-18          Urinalysis Basic - ( 18 Feb 2025 10:55 )    Color: x / Appearance: x / SG: x / pH: x  Gluc: 115 mg/dL / Ketone: x  / Bili: x / Urobili: x   Blood: x / Protein: x / Nitrite: x   Leuk Esterase: x / RBC: x / WBC x   Sq Epi: x / Non Sq Epi: x / Bacteria: x          RADIOLOGY & ADDITIONAL TESTS:  Results Reviewed:   Imaging Personally Reviewed:  Electrocardiogram Personally Reviewed:    COORDINATION OF CARE:  Care Discussed with Consultants/Other Providers [Y/N]:  Prior or Outpatient Records Reviewed [Y/N]:   Contreras Brooks MD  Division of Hospital Medicine  Available on MS teams until 7pm  If no response or off-hours, page [phone]  -------------------------------------    Patient is a 79y old  Male who presents with a chief complaint of HF eval (18 Feb 2025 12:24)      SUBJECTIVE / OVERNIGHT EVENTS: none acute  ADDITIONAL REVIEW OF SYSTEMS:     MEDICATIONS  (STANDING):  acetaZOLAMIDE    Tablet 250 milliGRAM(s) Oral daily  aspirin enteric coated 81 milliGRAM(s) Oral daily  atorvastatin 20 milliGRAM(s) Oral at bedtime  buMETAnide IVPB 4 milliGRAM(s) IV Intermittent every 8 hours  dextrose 5%. 1000 milliLiter(s) (50 mL/Hr) IV Continuous <Continuous>  dextrose 5%. 1000 milliLiter(s) (100 mL/Hr) IV Continuous <Continuous>  dextrose 50% Injectable 25 Gram(s) IV Push once  dextrose 50% Injectable 25 Gram(s) IV Push once  dextrose 50% Injectable 12.5 Gram(s) IV Push once  fluticasone propionate/ salmeterol 250-50 MICROgram(s) Diskus 1 Dose(s) Inhalation two times a day  glucagon  Injectable 1 milliGRAM(s) IntraMuscular once  hydrALAZINE 10 milliGRAM(s) Oral three times a day  insulin glargine Injectable (LANTUS) 32 Unit(s) SubCutaneous at bedtime  insulin lispro (ADMELOG) corrective regimen sliding scale   SubCutaneous three times a day before meals  insulin lispro Injectable (ADMELOG) 3 Unit(s) SubCutaneous three times a day before meals  isosorbide   dinitrate Tablet (ISORDIL) 10 milliGRAM(s) Oral three times a day  metoprolol succinate ER 50 milliGRAM(s) Oral daily  polyethylene glycol 3350 17 Gram(s) Oral two times a day  rivaroxaban 15 milliGRAM(s) Oral with dinner  senna 2 Tablet(s) Oral at bedtime  sodium chloride 0.65% Nasal 1 Spray(s) Both Nostrils two times a day  spironolactone 25 milliGRAM(s) Oral daily  tamsulosin 0.4 milliGRAM(s) Oral at bedtime  tiotropium 2.5 MICROgram(s) Inhaler 2 Puff(s) Inhalation daily    MEDICATIONS  (PRN):  acetaminophen     Tablet .. 650 milliGRAM(s) Oral every 6 hours PRN Temp greater or equal to 38C (100.4F), Mild Pain (1 - 3)  albuterol/ipratropium for Nebulization 3 milliLiter(s) Nebulizer every 6 hours PRN Shortness of Breath and/or Wheezing  dextrose Oral Gel 15 Gram(s) Oral once PRN Blood Glucose LESS THAN 70 milliGRAM(s)/deciliter  melatonin 3 milliGRAM(s) Oral at bedtime PRN Insomnia      CAPILLARY BLOOD GLUCOSE      POCT Blood Glucose.: 150 mg/dL (18 Feb 2025 12:43)  POCT Blood Glucose.: 83 mg/dL (18 Feb 2025 08:36)  POCT Blood Glucose.: 165 mg/dL (17 Feb 2025 21:36)  POCT Blood Glucose.: 186 mg/dL (17 Feb 2025 17:19)    I&O's Summary    17 Feb 2025 07:01  -  18 Feb 2025 07:00  --------------------------------------------------------  IN: 600 mL / OUT: 1275 mL / NET: -675 mL        PHYSICAL EXAM:  Vital Signs Last 24 Hrs  T(C): 36.5 (18 Feb 2025 11:37), Max: 36.7 (17 Feb 2025 23:45)  T(F): 97.7 (18 Feb 2025 11:37), Max: 98 (17 Feb 2025 23:45)  HR: 71 (18 Feb 2025 11:37) (69 - 82)  BP: 107/64 (18 Feb 2025 11:37) (101/66 - 132/78)  BP(mean): --  RR: 18 (18 Feb 2025 11:37) (18 - 18)  SpO2: 99% (18 Feb 2025 11:37) (97% - 100%)    Parameters below as of 18 Feb 2025 11:37  Patient On (Oxygen Delivery Method): nasal cannula      CONSTITUTIONAL: NAD  EYES: PERRLA; conjunctiva and sclera clear  ENMT: MMM  NECK: Supple  RESPIRATORY: Normal respiratory effort; CTAB  CARDIOVASCULAR: RRR, no JVD, +2 pitting edema, improving, wrapped   ABDOMEN: Nontender to palpation, normoactive BS, no guarding/rigidity  MUSCLOSKELETAL:  no clubbing/cyanosis, no joint swelling or tenderness to palpation  PSYCH: A+O x 2-3, affect normal  NEUROLOGY: CN 2-12 are intact and symmetric; no gross sensory or motor deficits  SKIN: No rashes; no palpable lesions    LABS:                        9.4    11.26 )-----------( 174      ( 17 Feb 2025 11:13 )             34.4     02-18    143  |  99  |  59[H]  ----------------------------<  115[H]  3.7   |  30  |  1.68[H]    Ca    9.4      18 Feb 2025 10:55  Phos  4.4     02-18  Mg     2.5     02-18    TPro  7.8  /  Alb  3.6  /  TBili  0.8  /  DBili  x   /  AST  38  /  ALT  21  /  AlkPhos  310[H]  02-18          Urinalysis Basic - ( 18 Feb 2025 10:55 )    Color: x / Appearance: x / SG: x / pH: x  Gluc: 115 mg/dL / Ketone: x  / Bili: x / Urobili: x   Blood: x / Protein: x / Nitrite: x   Leuk Esterase: x / RBC: x / WBC x   Sq Epi: x / Non Sq Epi: x / Bacteria: x          RADIOLOGY & ADDITIONAL TESTS:  Results Reviewed:   Imaging Personally Reviewed:  Electrocardiogram Personally Reviewed:    COORDINATION OF CARE:  Care Discussed with Consultants/Other Providers [Y/N]: HF  Prior or Outpatient Records Reviewed [Y/N]:

## 2025-02-18 NOTE — PROGRESS NOTE ADULT - ATTENDING COMMENTS
Mr. Solares is a 79 years old with history of CAD status post CABG, HFrEF with LVEF 25 to 30% status post ICD, severe AS status post TAVR, initially admitted to [facility] for decompensated heart failure and then transferred to [facility] on Feb 5th due to refractory volume overload.  TTE showed biventricular dysfunction.  He has been getting diuresis with Bumex drip with acceptable response and he now appears close to euvolemic. The patient is DNR/DNI    Assessment:   Acute on chronic heart failure exacerbation  Acute on chronic kidney disease  CAD s/p CABG  Severe AS s/p TAVR  ICM s/p CRT-D  Permanent atrial fibrillation (V-paced)      Plan:   We will continue IV diuresis for one more day and switch to oral from tomorrow.   Continue Toprol 50 mg daily.   Continue hydralazine and isordil for now. Will transition to entresto tomorrow if renal function continues to improve.   Add spironolactone 25 mg today.   Continue aspirin and statin for CAD.  Eliquis for atrial fibrillation.  Strict I's and O's. Daily weights. Mr. Solares is a 79 years old with history of CAD status post CABG, HFrEF with LVEF 25 to 30% status post ICD, severe AS status post TAVR, initially admitted to [facility] for decompensated heart failure and then transferred to [facility] on Feb 5th due to refractory volume overload.  TTE showed biventricular dysfunction.  He has been getting diuresis with Bumex drip with acceptable response and he now appears close to euvolemic. The patient is DNR/DNI.     Assessment:   Acute on chronic heart failure exacerbation  Acute on chronic kidney disease  CAD s/p CABG  Severe AS s/p TAVR  ICM s/p CRT-D  Permanent atrial fibrillation (V-paced)  Chronic hypoxic respiratory failure 2/2 COPD (on 3L home oxygen)    Plan:   Switch to oral torsemide 20 mg BID. Stop IV bumex and diamox.  Continue Toprol 50 mg daily.   Continue spironolactone 25 mg today.   Continue hydralazine and isordil for now. Will transition to entresto tomorrow.  Continue aspirin and statin for CAD.  Eliquis for atrial fibrillation.  Strict I's and O's. Daily weights. Mr. Solares is a 79 years old with history of CAD status post CABG, HFrEF with LVEF 25 to 30% status post ICD, severe AS status post TAVR, initially admitted to [facility] for decompensated heart failure and then transferred to [facility] on Feb 5th due to refractory volume overload.  TTE showed biventricular dysfunction.  He has been getting diuresis with Bumex drip with acceptable response and he now appears close to euvolemic. The patient is DNR/DNI.     Assessment:   Acute on chronic heart failure exacerbation  Acute on chronic kidney disease  CAD s/p CABG  Severe AS s/p TAVR  ICM s/p CRT-D  Permanent atrial fibrillation (V-paced)  Chronic hypoxic respiratory failure 2/2 COPD (on 3L home oxygen)    Plan:   IV bumex for one more today. Will transition to oral tomorrow/.  Continue Toprol 50 mg daily.   Add spironolactone 25 mg today.   Continue hydralazine and isordil for now. Will transition to entresto later.  Continue aspirin and statin for CAD.  Eliquis for atrial fibrillation.  Strict I's and O's. Daily weights.

## 2025-02-18 NOTE — PROGRESS NOTE ADULT - PROBLEM SELECTOR PLAN 1
EF 30% with severe tricuspid regurg and PASP 55mmHG on 2/7/24  - Diuretic:  bumex gtt 1mg/he -> to be dc'd at 1159 2/17 and start torsemide 2/18 in AM per HF notes, now back to bumex IVP x 1 day  - Diamox back on, continuing   - BB:  Metoprolol succinate 50mg day  - ARNI/ACE-I/ARB: Ramipril 2.5mg daily - hold given DENISHA on CKD  - Hydralazine/Nitrate: Add hydral 10mg TID. Isodril 10mg TID   - MRA:  resumed aldactone  - SGLT2: none   dry wt is 243 lbs

## 2025-02-18 NOTE — PROGRESS NOTE ADULT - ASSESSMENT
78 y/o male w/ PMHx CAD s/p CABG, HF (combined HFrEF [EF 25%] and HFpEF [G3DD]) w/ ICD, AFib on Xarelto, severe AS s/p TAVR, COPD on 3-4L home O2, CKD4, HTN, HLD, T2DM, and BPH who presents as a transfer from [facility] for HF evaluation. Ongoing CHF exacerbation on bumex gtt.

## 2025-02-18 NOTE — PROGRESS NOTE ADULT - SUBJECTIVE AND OBJECTIVE BOX
Patient seen and examined at bedside.    Overnight Events: NAEON    REVIEW OF SYSTEMS:  CONSTITUTIONAL: No weakness, fevers or chills  EYES/ENT: No visual changes;  No dysphagia  NECK: No pain or stiffness  RESPIRATORY: No cough, wheezing, hemoptysis; No shortness of breath  CARDIOVASCULAR: No chest pain or palpitations; No lower extremity edema  GASTROINTESTINAL: No abdominal or epigastric pain. No nausea, vomiting, or hematemesis; No diarrhea or constipation. No melena or hematochezia.  BACK: No back pain  GENITOURINARY: No dysuria, frequency or hematuria  NEUROLOGICAL: No numbness or weakness  SKIN: No itching, burning, rashes, or lesions   All other review of systems is negative unless indicated above.            Current Meds:  acetaminophen     Tablet .. 650 milliGRAM(s) Oral every 6 hours PRN  acetaZOLAMIDE    Tablet 250 milliGRAM(s) Oral daily  albuterol/ipratropium for Nebulization 3 milliLiter(s) Nebulizer every 6 hours PRN  aspirin enteric coated 81 milliGRAM(s) Oral daily  atorvastatin 20 milliGRAM(s) Oral at bedtime  buMETAnide IVPB 4 milliGRAM(s) IV Intermittent every 8 hours  dextrose 5%. 1000 milliLiter(s) IV Continuous <Continuous>  dextrose 5%. 1000 milliLiter(s) IV Continuous <Continuous>  dextrose 50% Injectable 25 Gram(s) IV Push once  dextrose 50% Injectable 12.5 Gram(s) IV Push once  dextrose 50% Injectable 25 Gram(s) IV Push once  dextrose Oral Gel 15 Gram(s) Oral once PRN  fluticasone propionate/ salmeterol 250-50 MICROgram(s) Diskus 1 Dose(s) Inhalation two times a day  glucagon  Injectable 1 milliGRAM(s) IntraMuscular once  hydrALAZINE 10 milliGRAM(s) Oral three times a day  insulin glargine Injectable (LANTUS) 32 Unit(s) SubCutaneous at bedtime  insulin lispro (ADMELOG) corrective regimen sliding scale   SubCutaneous three times a day before meals  insulin lispro Injectable (ADMELOG) 3 Unit(s) SubCutaneous three times a day before meals  isosorbide   dinitrate Tablet (ISORDIL) 10 milliGRAM(s) Oral three times a day  melatonin 3 milliGRAM(s) Oral at bedtime PRN  metoprolol succinate ER 50 milliGRAM(s) Oral daily  polyethylene glycol 3350 17 Gram(s) Oral two times a day  rivaroxaban 15 milliGRAM(s) Oral with dinner  senna 2 Tablet(s) Oral at bedtime  sodium chloride 0.65% Nasal 1 Spray(s) Both Nostrils two times a day  spironolactone 25 milliGRAM(s) Oral daily  tamsulosin 0.4 milliGRAM(s) Oral at bedtime  tiotropium 2.5 MICROgram(s) Inhaler 2 Puff(s) Inhalation daily      Vitals:  T(F): 97.7 (02-18), Max: 98 (02-17)  HR: 71 (02-18) (69 - 82)  BP: 107/64 (02-18) (101/66 - 132/78)  RR: 18 (02-18)  SpO2: 99% (02-18)  I&O's Summary    17 Feb 2025 07:01  -  18 Feb 2025 07:00  --------------------------------------------------------  IN: 600 mL / OUT: 1275 mL / NET: -675 mL        Physical Exam:  GENERAL: NAD  HEAD:  Atraumatic, Normocephalic.  NECK: Supple, No JVD.  CHEST/LUNG: Clear to auscultation bilaterally; No rales, rhonchi, wheezing, or rubs. Speaking in full sentences  HEART: Regular rate and rhythm; No murmurs, rubs, or gallops.  ABDOMEN: Bowel sounds present; Soft, Nontender, Nondistended.   EXTREMITIES:  edema                          9.4    11.26 )-----------( 174      ( 17 Feb 2025 11:13 )             34.4     02-18    143  |  99  |  59[H]  ----------------------------<  115[H]  3.7   |  30  |  1.68[H]    Ca    9.4      18 Feb 2025 10:55  Phos  4.4     02-18  Mg     2.5     02-18    TPro  7.8  /  Alb  3.6  /  TBili  0.8  /  DBili  x   /  AST  38  /  ALT  21  /  AlkPhos  310[H]  02-18

## 2025-02-18 NOTE — PROGRESS NOTE ADULT - ASSESSMENT
78 y/o male w/ PMHx CAD s/p CABG, HFrEF (LVIDd 5.2, LVEF 25-30%) w/ DC- ICD, AFib (Xarelto), severe AS s/p TAVR, COPD on 3-4L home O2, CKD3, HTN, HLD, T2DM, and BPH presented to [facility] for ADHF, despite escalation of home diuretics. Now transferred to [facility] for refractory volume overload. His labs notable for stable Scr (though unclear baseline).  Repeat TTE shows severe BiV dysfunction. s/p RRT for hypotension given 500cc bolus on 2/11    GOC clarified and he remains DNR/DNI per wife - wants the ICD on. He was being diuresed well with bumex drip augmented with diamox. POCUS eval of IVC showed normal size with respiratory variability appears more euvolemic. Plan of care is to transition to oral diuretics.     Cardiac Studies  TTE 2/7/25 LVIDd 5.3cm, LVEF 30%, no LV thrombus, TAPSE 1.0cm, severely enlarged RV size/RVSF reduced, mod dilated LA, severe dilated RA, TAVR present, mod MR, severe TR, PASP 52, PASP 52, no pericardial effusion, IVC dilated 2.35cm  TTE 8/8/24; LVIDd 5.2, EF 25-30%, global hypokinesis, severe grade III DD, TASPE 1.1, TAVR present in AV position, mld-mod MR, mod TR, PASP 53

## 2025-02-18 NOTE — PROGRESS NOTE ADULT - PROBLEM SELECTOR PLAN 1
: Etiology likely ischemic. Primary cardiologist Dr. Jewel Stubbs ([facility]), last TTE LVEF 25-30%. Appears has been on some GDMT as outpt (?Farxiga, Coreg and previously on ARNI though unclear why stopped)  - Toprol XL to 50mg QD   - Will defer ARB/ARNI/SGLT2I/MRA for now given degree of renal dysfunction.  - Afterload: c/w ISDN 10mg TID and HDZN 10mg TID with hold parameter <90 SBP. start spironolactone 25mg  - Diuretics: start bumex 4mg IV TID  - Has ICD in place; Currently a DNR/DNI: trial NIV. Discussed with palliative and at this time wants ICD on as per wife  - Trend twice daily BMP, LFTs, while on IV loop diuretics  - Aggressive PT  - Daily Standing weight with PT   - Strict I/O's  - Target electrolyte repletion to target K+ >4.0 and Mg >2.0.

## 2025-02-19 LAB
ANION GAP SERPL CALC-SCNC: 16 MMOL/L — SIGNIFICANT CHANGE UP (ref 5–17)
BUN SERPL-MCNC: 66 MG/DL — HIGH (ref 7–23)
CALCIUM SERPL-MCNC: 9.4 MG/DL — SIGNIFICANT CHANGE UP (ref 8.4–10.5)
CHLORIDE SERPL-SCNC: 95 MMOL/L — LOW (ref 96–108)
CO2 SERPL-SCNC: 32 MMOL/L — HIGH (ref 22–31)
CREAT SERPL-MCNC: 1.92 MG/DL — HIGH (ref 0.5–1.3)
EGFR: 35 ML/MIN/1.73M2 — LOW
EGFR: 35 ML/MIN/1.73M2 — LOW
GLUCOSE BLDC GLUCOMTR-MCNC: 129 MG/DL — HIGH (ref 70–99)
GLUCOSE BLDC GLUCOMTR-MCNC: 163 MG/DL — HIGH (ref 70–99)
GLUCOSE BLDC GLUCOMTR-MCNC: 166 MG/DL — HIGH (ref 70–99)
GLUCOSE BLDC GLUCOMTR-MCNC: 211 MG/DL — HIGH (ref 70–99)
GLUCOSE BLDC GLUCOMTR-MCNC: 60 MG/DL — LOW (ref 70–99)
GLUCOSE BLDC GLUCOMTR-MCNC: 61 MG/DL — LOW (ref 70–99)
GLUCOSE BLDC GLUCOMTR-MCNC: 66 MG/DL — LOW (ref 70–99)
GLUCOSE BLDC GLUCOMTR-MCNC: 70 MG/DL — SIGNIFICANT CHANGE UP (ref 70–99)
GLUCOSE SERPL-MCNC: 108 MG/DL — HIGH (ref 70–99)
HCT VFR BLD CALC: 38.3 % — LOW (ref 39–50)
HGB BLD-MCNC: 10.3 G/DL — LOW (ref 13–17)
MAGNESIUM SERPL-MCNC: 2.7 MG/DL — HIGH (ref 1.6–2.6)
MCHC RBC-ENTMCNC: 26 PG — LOW (ref 27–34)
MCHC RBC-ENTMCNC: 26.9 G/DL — LOW (ref 32–36)
MCV RBC AUTO: 96.7 FL — SIGNIFICANT CHANGE UP (ref 80–100)
NRBC BLD AUTO-RTO: 0 /100 WBCS — SIGNIFICANT CHANGE UP (ref 0–0)
PHOSPHATE SERPL-MCNC: 4.7 MG/DL — HIGH (ref 2.5–4.5)
PLATELET # BLD AUTO: 174 K/UL — SIGNIFICANT CHANGE UP (ref 150–400)
POTASSIUM SERPL-MCNC: 3.7 MMOL/L — SIGNIFICANT CHANGE UP (ref 3.5–5.3)
POTASSIUM SERPL-SCNC: 3.7 MMOL/L — SIGNIFICANT CHANGE UP (ref 3.5–5.3)
RBC # BLD: 3.96 M/UL — LOW (ref 4.2–5.8)
RBC # FLD: 23.4 % — HIGH (ref 10.3–14.5)
SODIUM SERPL-SCNC: 143 MMOL/L — SIGNIFICANT CHANGE UP (ref 135–145)
WBC # BLD: 10.23 K/UL — SIGNIFICANT CHANGE UP (ref 3.8–10.5)
WBC # FLD AUTO: 10.23 K/UL — SIGNIFICANT CHANGE UP (ref 3.8–10.5)

## 2025-02-19 PROCEDURE — 99232 SBSQ HOSP IP/OBS MODERATE 35: CPT

## 2025-02-19 PROCEDURE — 99233 SBSQ HOSP IP/OBS HIGH 50: CPT

## 2025-02-19 RX ORDER — DEXTROSE 50 % IN WATER 50 %
15 SYRINGE (ML) INTRAVENOUS ONCE
Refills: 0 | Status: DISCONTINUED | OUTPATIENT
Start: 2025-02-19 | End: 2025-04-14

## 2025-02-19 RX ORDER — INSULIN GLARGINE-YFGN 100 [IU]/ML
20 INJECTION, SOLUTION SUBCUTANEOUS AT BEDTIME
Refills: 0 | Status: DISCONTINUED | OUTPATIENT
Start: 2025-02-19 | End: 2025-02-26

## 2025-02-19 RX ORDER — RAMIPRIL 2.5 MG/1
1 CAPSULE ORAL
Refills: 0 | DISCHARGE

## 2025-02-19 RX ORDER — TORSEMIDE 10 MG
20 TABLET ORAL
Refills: 0 | Status: DISCONTINUED | OUTPATIENT
Start: 2025-02-20 | End: 2025-02-21

## 2025-02-19 RX ORDER — SACUBITRIL AND VALSARTAN 6; 6 MG/1; MG/1
1 PELLET ORAL
Qty: 0 | Refills: 0 | DISCHARGE
Start: 2025-02-19

## 2025-02-19 RX ORDER — SACUBITRIL AND VALSARTAN 6; 6 MG/1; MG/1
1 PELLET ORAL
Refills: 0 | Status: DISCONTINUED | OUTPATIENT
Start: 2025-02-19 | End: 2025-02-20

## 2025-02-19 RX ADMIN — Medication 10 MILLIGRAM(S): at 06:28

## 2025-02-19 RX ADMIN — Medication 1 DOSE(S): at 06:28

## 2025-02-19 RX ADMIN — INSULIN GLARGINE-YFGN 20 UNIT(S): 100 INJECTION, SOLUTION SUBCUTANEOUS at 21:42

## 2025-02-19 RX ADMIN — POLYETHYLENE GLYCOL 3350 17 GRAM(S): 17 POWDER, FOR SOLUTION ORAL at 06:26

## 2025-02-19 RX ADMIN — TIOTROPIUM BROMIDE INHALATION SPRAY 2 PUFF(S): 3.12 SPRAY, METERED RESPIRATORY (INHALATION) at 17:12

## 2025-02-19 RX ADMIN — POLYETHYLENE GLYCOL 3350 17 GRAM(S): 17 POWDER, FOR SOLUTION ORAL at 17:12

## 2025-02-19 RX ADMIN — Medication 2 TABLET(S): at 21:23

## 2025-02-19 RX ADMIN — INSULIN LISPRO 3 UNIT(S): 100 INJECTION, SOLUTION INTRAVENOUS; SUBCUTANEOUS at 17:58

## 2025-02-19 RX ADMIN — Medication 1 SPRAY(S): at 06:27

## 2025-02-19 RX ADMIN — ISOSORBDIE DINITRATE 10 MILLIGRAM(S): 30 TABLET ORAL at 06:27

## 2025-02-19 RX ADMIN — Medication 81 MILLIGRAM(S): at 13:06

## 2025-02-19 RX ADMIN — TAMSULOSIN HYDROCHLORIDE 0.4 MILLIGRAM(S): 0.4 CAPSULE ORAL at 21:22

## 2025-02-19 RX ADMIN — Medication 1 DOSE(S): at 17:11

## 2025-02-19 RX ADMIN — INSULIN LISPRO 3 UNIT(S): 100 INJECTION, SOLUTION INTRAVENOUS; SUBCUTANEOUS at 13:02

## 2025-02-19 RX ADMIN — ACETAZOLAMIDE 250 MILLIGRAM(S): 250 TABLET ORAL at 13:06

## 2025-02-19 RX ADMIN — SACUBITRIL AND VALSARTAN 1 TABLET(S): 6; 6 PELLET ORAL at 17:16

## 2025-02-19 RX ADMIN — METOPROLOL SUCCINATE 50 MILLIGRAM(S): 50 TABLET, EXTENDED RELEASE ORAL at 06:27

## 2025-02-19 RX ADMIN — INSULIN LISPRO 2: 100 INJECTION, SOLUTION INTRAVENOUS; SUBCUTANEOUS at 13:01

## 2025-02-19 RX ADMIN — RIVAROXABAN 15 MILLIGRAM(S): 10 TABLET, FILM COATED ORAL at 17:12

## 2025-02-19 RX ADMIN — ATORVASTATIN CALCIUM 20 MILLIGRAM(S): 80 TABLET, FILM COATED ORAL at 21:22

## 2025-02-19 RX ADMIN — INSULIN LISPRO 2: 100 INJECTION, SOLUTION INTRAVENOUS; SUBCUTANEOUS at 17:58

## 2025-02-19 RX ADMIN — Medication 1 SPRAY(S): at 17:11

## 2025-02-19 RX ADMIN — Medication 25 MILLIGRAM(S): at 06:32

## 2025-02-19 RX ADMIN — BUMETANIDE 132 MILLIGRAM(S): 1 TABLET ORAL at 06:31

## 2025-02-19 NOTE — PROGRESS NOTE ADULT - PROBLEM SELECTOR PLAN 1
EF 30% with severe tricuspid regurg and PASP 55mmHG on 2/7/24  - Diuretic: torsemide 20mg po bid  - BB:  Metoprolol succinate 50mg day  - ARNI/ACE-I/ARB: entresto  - Hydralazine/Nitrate: Add hydral 10mg TID. Isodril 10mg TID   - MRA:  resumed aldactone  - SGLT2: none   dry wt is 243 lbs

## 2025-02-19 NOTE — PROGRESS NOTE ADULT - ATTENDING COMMENTS
Mr. Solares is a 79 years old with history of CAD status post CABG, HFrEF with LVEF 25 to 30% status post ICD, severe AS status post TAVR, initially admitted to [facility] for decompensated heart failure and then transferred to [facility] on Feb 5th due to refractory volume overload.  TTE showed biventricular dysfunction.  He has been getting diuresis with Bumex drip with acceptable response and he now appears close to euvolemic. The patient is DNR/DNI.     Assessment:   Acute on chronic heart failure exacerbation  Acute on chronic kidney disease  CAD s/p CABG  Severe AS s/p TAVR  ICM s/p CRT-D  Permanent atrial fibrillation (V-paced)  Chronic hypoxic respiratory failure 2/2 COPD (on 3L home oxygen)    Plan:   Switch to oral torsemide 20 mg BID. Stop IV bumex and diamox.  Continue Toprol 50 mg daily.   Continue spironolactone 25 mg today.   Continue hydralazine and isordil for now. Will transition to entresto tomorrow.  Continue aspirin and statin for CAD.  Eliquis for atrial fibrillation.  Strict I's and O's. Daily weights. Mr. Solares is a 79 years old with history of CAD status post CABG, HFrEF with LVEF 25 to 30% status post ICD, severe AS status post TAVR, initially admitted to [facility] for decompensated heart failure and then transferred to [facility] on Feb 5th due to refractory volume overload.  TTE showed biventricular dysfunction.  He has been getting diuresis with Bumex drip with acceptable response and he now appears close to euvolemic. The patient is DNR/DNI.     Assessment:   Acute on chronic heart failure exacerbation  Acute on chronic kidney disease  CAD s/p CABG  Severe AS s/p TAVR  ICM s/p CRT-D  Permanent atrial fibrillation (V-paced)  Chronic hypoxic respiratory failure 2/2 COPD (on 3L home oxygen)    Plan:   Switch to oral torsemide 20 mg BID. Stop IV bumex and diamox.  Continue Toprol 50 mg daily.   Continue spironolactone 25 mg today.   Stop hydralazine and isordil. Start low dose entresto.  Continue aspirin and statin for CAD.  Eliquis for atrial fibrillation.  Strict I's and O's. Daily weights.  HF will sign off.

## 2025-02-19 NOTE — PROGRESS NOTE ADULT - ASSESSMENT
78 y/o male w/ PMHx CAD s/p CABG, HFrEF (LVIDd 5.2, LVEF 25-30%) w/ DC- ICD, AFib (Xarelto), severe AS s/p TAVR, COPD on 3-4L home O2, CKD3, HTN, HLD, T2DM, and BPH presented to [facility] for ADHF, despite escalation of home diuretics. Now transferred to [facility] for refractory volume overload. His labs notable for stable Scr (though unclear baseline).  Repeat TTE shows severe BiV dysfunction. s/p RRT for hypotension given 500cc bolus on 2/11    GOC clarified and he remains DNR/DNI per wife - wants the ICD on. He was being diuresed well with bumex drip augmented with diamox. POCUS eval of IVC showed normal size with respiratory variability appears more euvolemic. Plan of care is to transition to oral diuretics.     Cardiac Studies  TTE 2/7/25 LVIDd 5.3cm, LVEF 30%, no LV thrombus, TAPSE 1.0cm, severely enlarged RV size/RVSF reduced, mod dilated LA, severe dilated RA, TAVR present, mod MR, severe TR, PASP 52, PASP 52, no pericardial effusion, IVC dilated 2.35cm  TTE 8/8/24; LVIDd 5.2, EF 25-30%, global hypokinesis, severe grade III DD, TASPE 1.1, TAVR present in AV position, mld-mod MR, mod TR, PASP 53

## 2025-02-19 NOTE — PROGRESS NOTE ADULT - ASSESSMENT
80 y/o male w/ PMHx CAD s/p CABG, HF (combined HFrEF [EF 25%] and HFpEF [G3DD]) w/ ICD, AFib on Xarelto, severe AS s/p TAVR, COPD on 3-4L home O2, CKD4, HTN, HLD, T2DM, and BPH who presents as a transfer from [facility] for HF evaluation. Ongoing CHF exacerbation now off bumex gtt

## 2025-02-19 NOTE — PROGRESS NOTE ADULT - PROBLEM SELECTOR PLAN 1
: Etiology likely ischemic. Primary cardiologist Dr. Jewel Stubbs ([facility]), last TTE LVEF 25-30%. Appears has been on some GDMT as outpt (?Farxiga, Coreg and previously on ARNI though unclear why stopped)  - Toprol XL 50mg QD   - start entresto 24/26, stop hydral/isordil  - continue spironolactone 25mg  - likely add farxiga tomorrow  - Diuretics: switch to torsemide 20mg BID, stop diamox and IV bumex  - Has ICD in place; Currently a DNR/DNI: trial NIV. Discussed with palliative and at this time wants ICD on as per wife  - Aggressive PT  - Daily Standing weight with PT   - Strict I/O's  - Target electrolyte repletion to target K+ >4.0 and Mg >2.0. : Etiology likely ischemic. Primary cardiologist Dr. Jewel Stubbs ([facility]), last TTE LVEF 25-30%. Appears has been on some GDMT as outpt (?Farxiga, Coreg and previously on ARNI though unclear why stopped)  - Toprol XL 50mg QD   - start entresto 24/26, stop hydral/isordil  - continue spironolactone 25mg  - add farxiga tomorrow if creatinine stable  - Diuretics: switch to torsemide 20mg BID, stop diamox and IV bumex  - Has ICD in place; Currently a DNR/DNI: trial NIV. Discussed with palliative and at this time wants ICD on as per wife  - Aggressive PT  - Daily Standing weight with PT   - Strict I/O's  - Target electrolyte repletion to target K+ >4.0 and Mg >2.0.  - close HF follow up

## 2025-02-19 NOTE — PROGRESS NOTE ADULT - SUBJECTIVE AND OBJECTIVE BOX
Patient seen and examined at bedside.    Overnight Events: NAEON    REVIEW OF SYSTEMS:  CONSTITUTIONAL: No weakness, fevers or chills  EYES/ENT: No visual changes;  No dysphagia  NECK: No pain or stiffness  RESPIRATORY: No cough, wheezing, hemoptysis; No shortness of breath  CARDIOVASCULAR: No chest pain or palpitations; No lower extremity edema  GASTROINTESTINAL: No abdominal or epigastric pain. No nausea, vomiting, or hematemesis; No diarrhea or constipation. No melena or hematochezia.  BACK: No back pain  GENITOURINARY: No dysuria, frequency or hematuria  NEUROLOGICAL: No numbness or weakness  SKIN: No itching, burning, rashes, or lesions   All other review of systems is negative unless indicated above.            Current Meds:  acetaminophen     Tablet .. 650 milliGRAM(s) Oral every 6 hours PRN  acetaZOLAMIDE    Tablet 250 milliGRAM(s) Oral daily  albuterol/ipratropium for Nebulization 3 milliLiter(s) Nebulizer every 6 hours PRN  aspirin enteric coated 81 milliGRAM(s) Oral daily  atorvastatin 20 milliGRAM(s) Oral at bedtime  buMETAnide IVPB 4 milliGRAM(s) IV Intermittent every 8 hours  dextrose 5%. 1000 milliLiter(s) IV Continuous <Continuous>  dextrose 5%. 1000 milliLiter(s) IV Continuous <Continuous>  dextrose 50% Injectable 25 Gram(s) IV Push once  dextrose 50% Injectable 25 Gram(s) IV Push once  dextrose 50% Injectable 12.5 Gram(s) IV Push once  dextrose Oral Gel 15 Gram(s) Oral once PRN  dextrose Oral Gel 15 Gram(s) Oral once PRN  fluticasone propionate/ salmeterol 250-50 MICROgram(s) Diskus 1 Dose(s) Inhalation two times a day  glucagon  Injectable 1 milliGRAM(s) IntraMuscular once  hydrALAZINE 10 milliGRAM(s) Oral three times a day  insulin glargine Injectable (LANTUS) 32 Unit(s) SubCutaneous at bedtime  insulin lispro (ADMELOG) corrective regimen sliding scale   SubCutaneous three times a day before meals  insulin lispro Injectable (ADMELOG) 3 Unit(s) SubCutaneous three times a day before meals  isosorbide   dinitrate Tablet (ISORDIL) 10 milliGRAM(s) Oral three times a day  melatonin 3 milliGRAM(s) Oral at bedtime PRN  metoprolol succinate ER 50 milliGRAM(s) Oral daily  polyethylene glycol 3350 17 Gram(s) Oral two times a day  rivaroxaban 15 milliGRAM(s) Oral with dinner  senna 2 Tablet(s) Oral at bedtime  sodium chloride 0.65% Nasal 1 Spray(s) Both Nostrils two times a day  spironolactone 25 milliGRAM(s) Oral daily  tamsulosin 0.4 milliGRAM(s) Oral at bedtime  tiotropium 2.5 MICROgram(s) Inhaler 2 Puff(s) Inhalation daily      Vitals:  T(F): 97.6 (02-19), Max: 98.6 (02-18)  HR: 70 (02-19) (69 - 78)  BP: 107/66 (02-19) (103/62 - 117/74)  RR: 18 (02-19)  SpO2: 100% (02-19)  I&O's Summary    18 Feb 2025 07:01  -  19 Feb 2025 07:00  --------------------------------------------------------  IN: 890 mL / OUT: 900 mL / NET: -10 mL    19 Feb 2025 07:01  -  19 Feb 2025 12:17  --------------------------------------------------------  IN: 240 mL / OUT: 400 mL / NET: -160 mL        Physical Exam:  GEN: NAD  HEENT: EOMI, clear sclera  PULM: on NC  CV: RRR S1 S2, no murmur, no JVD  ABD: S, NT, ND  EXT: WWP, wrapped  PSYCH: normal affect  SKIN: No rash                          10.3   10.23 )-----------( 174      ( 19 Feb 2025 10:07 )             38.3     02-19    143  |  95[L]  |  66[H]  ----------------------------<  108[H]  3.7   |  32[H]  |  1.92[H]    Ca    9.4      19 Feb 2025 10:07  Phos  4.7     02-19  Mg     2.7     02-19    TPro  7.8  /  Alb  3.6  /  TBili  0.8  /  DBili  x   /  AST  38  /  ALT  21  /  AlkPhos  310[H]  02-18

## 2025-02-19 NOTE — PROGRESS NOTE ADULT - SUBJECTIVE AND OBJECTIVE BOX
Contreras Brooks MD  Division of Hospital Medicine  Available on MS teams until 7pm  If no response or off-hours, page [phone]  -------------------------------------    Patient is a 79y old  Male who presents with a chief complaint of HF eval (19 Feb 2025 12:17)    SUBJECTIVE / OVERNIGHT EVENTS: none acute  ADDITIONAL REVIEW OF SYSTEMS: pt feels about the same, no sob/cough, no other new complaints.     MEDICATIONS  (STANDING):  aspirin enteric coated 81 milliGRAM(s) Oral daily  atorvastatin 20 milliGRAM(s) Oral at bedtime  dextrose 5%. 1000 milliLiter(s) (50 mL/Hr) IV Continuous <Continuous>  dextrose 5%. 1000 milliLiter(s) (100 mL/Hr) IV Continuous <Continuous>  dextrose 50% Injectable 12.5 Gram(s) IV Push once  dextrose 50% Injectable 25 Gram(s) IV Push once  dextrose 50% Injectable 25 Gram(s) IV Push once  fluticasone propionate/ salmeterol 250-50 MICROgram(s) Diskus 1 Dose(s) Inhalation two times a day  glucagon  Injectable 1 milliGRAM(s) IntraMuscular once  insulin glargine Injectable (LANTUS) 32 Unit(s) SubCutaneous at bedtime  insulin lispro (ADMELOG) corrective regimen sliding scale   SubCutaneous three times a day before meals  insulin lispro Injectable (ADMELOG) 3 Unit(s) SubCutaneous three times a day before meals  metoprolol succinate ER 50 milliGRAM(s) Oral daily  polyethylene glycol 3350 17 Gram(s) Oral two times a day  rivaroxaban 15 milliGRAM(s) Oral with dinner  sacubitril 24 mG/valsartan 26 mG 1 Tablet(s) Oral two times a day  senna 2 Tablet(s) Oral at bedtime  sodium chloride 0.65% Nasal 1 Spray(s) Both Nostrils two times a day  spironolactone 25 milliGRAM(s) Oral daily  tamsulosin 0.4 milliGRAM(s) Oral at bedtime  tiotropium 2.5 MICROgram(s) Inhaler 2 Puff(s) Inhalation daily    MEDICATIONS  (PRN):  acetaminophen     Tablet .. 650 milliGRAM(s) Oral every 6 hours PRN Temp greater or equal to 38C (100.4F), Mild Pain (1 - 3)  albuterol/ipratropium for Nebulization 3 milliLiter(s) Nebulizer every 6 hours PRN Shortness of Breath and/or Wheezing  dextrose Oral Gel 15 Gram(s) Oral once PRN Blood Glucose LESS THAN 70 milliGRAM(s)/deciliter  dextrose Oral Gel 15 Gram(s) Oral once PRN Blood Glucose LESS THAN 70 milliGRAM(s)/deciliter  melatonin 3 milliGRAM(s) Oral at bedtime PRN Insomnia      CAPILLARY BLOOD GLUCOSE      POCT Blood Glucose.: 166 mg/dL (19 Feb 2025 12:44)  POCT Blood Glucose.: 129 mg/dL (19 Feb 2025 09:26)  POCT Blood Glucose.: 70 mg/dL (19 Feb 2025 09:01)  POCT Blood Glucose.: 61 mg/dL (19 Feb 2025 08:44)  POCT Blood Glucose.: 60 mg/dL (19 Feb 2025 08:40)  POCT Blood Glucose.: 66 mg/dL (19 Feb 2025 08:39)  POCT Blood Glucose.: 114 mg/dL (18 Feb 2025 23:53)  POCT Blood Glucose.: 116 mg/dL (18 Feb 2025 21:14)  POCT Blood Glucose.: 146 mg/dL (18 Feb 2025 17:10)    I&O's Summary    18 Feb 2025 07:01  -  19 Feb 2025 07:00  --------------------------------------------------------  IN: 890 mL / OUT: 900 mL / NET: -10 mL    19 Feb 2025 07:01  -  19 Feb 2025 15:16  --------------------------------------------------------  IN: 360 mL / OUT: 400 mL / NET: -40 mL        PHYSICAL EXAM:  Vital Signs Last 24 Hrs  T(C): 36.4 (19 Feb 2025 11:44), Max: 37 (18 Feb 2025 20:45)  T(F): 97.6 (19 Feb 2025 11:44), Max: 98.6 (18 Feb 2025 20:45)  HR: 70 (19 Feb 2025 11:44) (69 - 73)  BP: 107/66 (19 Feb 2025 11:44) (103/62 - 117/74)  BP(mean): --  RR: 18 (19 Feb 2025 11:44) (18 - 18)  SpO2: 100% (19 Feb 2025 11:44) (96% - 100%)    Parameters below as of 19 Feb 2025 11:44  Patient On (Oxygen Delivery Method): nasal cannula      CONSTITUTIONAL: NAD  EYES: PERRLA; conjunctiva and sclera clear  ENMT: MMM  NECK: Supple  RESPIRATORY: Normal respiratory effort; CTAB  CARDIOVASCULAR: RRR, no JVD, +pitting edema, legs wrapped  ABDOMEN: Nontender to palpation, normoactive BS, no guarding/rigidity  MUSCLOSKELETAL:  no clubbing/cyanosis, no joint swelling or tenderness to palpation  PSYCH: A+O x 3, affect normal  NEUROLOGY: CN 2-12 are intact and symmetric; no gross sensory or motor deficits  SKIN: No rashes; no palpable lesions    LABS:                        10.3   10.23 )-----------( 174      ( 19 Feb 2025 10:07 )             38.3     02-19    143  |  95[L]  |  66[H]  ----------------------------<  108[H]  3.7   |  32[H]  |  1.92[H]    Ca    9.4      19 Feb 2025 10:07  Phos  4.7     02-19  Mg     2.7     02-19    TPro  7.8  /  Alb  3.6  /  TBili  0.8  /  DBili  x   /  AST  38  /  ALT  21  /  AlkPhos  310[H]  02-18          Urinalysis Basic - ( 19 Feb 2025 10:07 )    Color: x / Appearance: x / SG: x / pH: x  Gluc: 108 mg/dL / Ketone: x  / Bili: x / Urobili: x   Blood: x / Protein: x / Nitrite: x   Leuk Esterase: x / RBC: x / WBC x   Sq Epi: x / Non Sq Epi: x / Bacteria: x          RADIOLOGY & ADDITIONAL TESTS:  Results Reviewed:   Imaging Personally Reviewed:  Electrocardiogram Personally Reviewed:    COORDINATION OF CARE:  Care Discussed with Consultants/Other Providers [Y/N]: HF   Prior or Outpatient Records Reviewed [Y/N]:

## 2025-02-20 LAB
ANION GAP SERPL CALC-SCNC: 13 MMOL/L — SIGNIFICANT CHANGE UP (ref 5–17)
BUN SERPL-MCNC: 79 MG/DL — HIGH (ref 7–23)
CALCIUM SERPL-MCNC: 9.1 MG/DL — SIGNIFICANT CHANGE UP (ref 8.4–10.5)
CHLORIDE SERPL-SCNC: 93 MMOL/L — LOW (ref 96–108)
CO2 SERPL-SCNC: 30 MMOL/L — SIGNIFICANT CHANGE UP (ref 22–31)
CREAT SERPL-MCNC: 2.65 MG/DL — HIGH (ref 0.5–1.3)
EGFR: 23 ML/MIN/1.73M2 — LOW
EGFR: 23 ML/MIN/1.73M2 — LOW
GAS PNL BLDV: SIGNIFICANT CHANGE UP
GLUCOSE BLDC GLUCOMTR-MCNC: 164 MG/DL — HIGH (ref 70–99)
GLUCOSE BLDC GLUCOMTR-MCNC: 167 MG/DL — HIGH (ref 70–99)
GLUCOSE BLDC GLUCOMTR-MCNC: 185 MG/DL — HIGH (ref 70–99)
GLUCOSE BLDC GLUCOMTR-MCNC: 207 MG/DL — HIGH (ref 70–99)
GLUCOSE SERPL-MCNC: 210 MG/DL — HIGH (ref 70–99)
POTASSIUM SERPL-MCNC: 3.9 MMOL/L — SIGNIFICANT CHANGE UP (ref 3.5–5.3)
POTASSIUM SERPL-SCNC: 3.9 MMOL/L — SIGNIFICANT CHANGE UP (ref 3.5–5.3)
SODIUM SERPL-SCNC: 136 MMOL/L — SIGNIFICANT CHANGE UP (ref 135–145)

## 2025-02-20 PROCEDURE — 99232 SBSQ HOSP IP/OBS MODERATE 35: CPT

## 2025-02-20 RX ORDER — ISOSORBDIE DINITRATE 30 MG/1
10 TABLET ORAL THREE TIMES A DAY
Refills: 0 | Status: DISCONTINUED | OUTPATIENT
Start: 2025-02-20 | End: 2025-02-21

## 2025-02-20 RX ADMIN — Medication 1 SPRAY(S): at 05:51

## 2025-02-20 RX ADMIN — ISOSORBDIE DINITRATE 10 MILLIGRAM(S): 30 TABLET ORAL at 18:17

## 2025-02-20 RX ADMIN — POLYETHYLENE GLYCOL 3350 17 GRAM(S): 17 POWDER, FOR SOLUTION ORAL at 18:05

## 2025-02-20 RX ADMIN — POLYETHYLENE GLYCOL 3350 17 GRAM(S): 17 POWDER, FOR SOLUTION ORAL at 05:49

## 2025-02-20 RX ADMIN — Medication 81 MILLIGRAM(S): at 13:50

## 2025-02-20 RX ADMIN — Medication 25 MILLIGRAM(S): at 05:49

## 2025-02-20 RX ADMIN — Medication 20 MILLIGRAM(S): at 05:51

## 2025-02-20 RX ADMIN — Medication 10 MILLIGRAM(S): at 15:45

## 2025-02-20 RX ADMIN — RIVAROXABAN 15 MILLIGRAM(S): 10 TABLET, FILM COATED ORAL at 18:10

## 2025-02-20 RX ADMIN — INSULIN LISPRO 2: 100 INJECTION, SOLUTION INTRAVENOUS; SUBCUTANEOUS at 08:59

## 2025-02-20 RX ADMIN — Medication 1 DOSE(S): at 18:10

## 2025-02-20 RX ADMIN — METOPROLOL SUCCINATE 50 MILLIGRAM(S): 50 TABLET, EXTENDED RELEASE ORAL at 05:49

## 2025-02-20 RX ADMIN — ATORVASTATIN CALCIUM 20 MILLIGRAM(S): 80 TABLET, FILM COATED ORAL at 22:11

## 2025-02-20 RX ADMIN — INSULIN LISPRO 3 UNIT(S): 100 INJECTION, SOLUTION INTRAVENOUS; SUBCUTANEOUS at 12:46

## 2025-02-20 RX ADMIN — SACUBITRIL AND VALSARTAN 1 TABLET(S): 6; 6 PELLET ORAL at 05:49

## 2025-02-20 RX ADMIN — Medication 1 DOSE(S): at 05:51

## 2025-02-20 RX ADMIN — INSULIN LISPRO 4: 100 INJECTION, SOLUTION INTRAVENOUS; SUBCUTANEOUS at 12:45

## 2025-02-20 RX ADMIN — INSULIN GLARGINE-YFGN 20 UNIT(S): 100 INJECTION, SOLUTION SUBCUTANEOUS at 21:49

## 2025-02-20 RX ADMIN — Medication 1 SPRAY(S): at 18:06

## 2025-02-20 RX ADMIN — TAMSULOSIN HYDROCHLORIDE 0.4 MILLIGRAM(S): 0.4 CAPSULE ORAL at 22:11

## 2025-02-20 RX ADMIN — INSULIN LISPRO 3 UNIT(S): 100 INJECTION, SOLUTION INTRAVENOUS; SUBCUTANEOUS at 08:59

## 2025-02-20 RX ADMIN — TIOTROPIUM BROMIDE INHALATION SPRAY 2 PUFF(S): 3.12 SPRAY, METERED RESPIRATORY (INHALATION) at 12:33

## 2025-02-20 RX ADMIN — Medication 20 MILLIGRAM(S): at 13:50

## 2025-02-20 RX ADMIN — INSULIN LISPRO 3 UNIT(S): 100 INJECTION, SOLUTION INTRAVENOUS; SUBCUTANEOUS at 18:09

## 2025-02-20 RX ADMIN — INSULIN LISPRO 2: 100 INJECTION, SOLUTION INTRAVENOUS; SUBCUTANEOUS at 18:06

## 2025-02-20 NOTE — PROGRESS NOTE ADULT - ASSESSMENT
80 y/o male w/ PMHx CAD s/p CABG, HF (combined HFrEF [EF 25%] and HFpEF [G3DD]) w/ ICD, AFib on Xarelto, severe AS s/p TAVR, COPD on 3-4L home O2, CKD4, HTN, HLD, T2DM, and BPH who presents as a transfer from [facility] for HF evaluation. Ongoing CHF exacerbation now off bumex gtt, new DENISHA on CKD

## 2025-02-20 NOTE — PROGRESS NOTE ADULT - PROBLEM SELECTOR PLAN 1
EF 30% with severe tricuspid regurg and PASP 55mmHG on 2/7/24  - Diuretic: torsemide 20mg po bid  - BB:  Metoprolol succinate 50mg day  - ARNI/ACE-I/ARB: hold entresto  - Hydralazine/Nitrate: Add back hydral 10mg TID. Isodril 10mg TID   - MRA:  resumed aldactone  - SGLT2: none   dry wt is 243 lbs

## 2025-02-20 NOTE — PROGRESS NOTE ADULT - SUBJECTIVE AND OBJECTIVE BOX
Addended by: RL DUONG on: 8/6/2021 08:46 AM     Modules accepted: Orders     Contreras Brooks MD  Division of Hospital Medicine  Available on MS teams until 7pm  If no response or off-hours, page [phone]  -------------------------------------    Patient is a 79y old  Male who presents with a chief complaint of HF eval (20 Feb 2025 10:24)    SUBJECTIVE / OVERNIGHT EVENTS: none acute  ADDITIONAL REVIEW OF SYSTEMS: pt feels ok, eager for dispo and PT at rehab. No fevers/chills. No significant SOB or cough.    MEDICATIONS  (STANDING):  aspirin enteric coated 81 milliGRAM(s) Oral daily  atorvastatin 20 milliGRAM(s) Oral at bedtime  dextrose 5%. 1000 milliLiter(s) (100 mL/Hr) IV Continuous <Continuous>  dextrose 5%. 1000 milliLiter(s) (50 mL/Hr) IV Continuous <Continuous>  dextrose 50% Injectable 25 Gram(s) IV Push once  dextrose 50% Injectable 12.5 Gram(s) IV Push once  dextrose 50% Injectable 25 Gram(s) IV Push once  fluticasone propionate/ salmeterol 250-50 MICROgram(s) Diskus 1 Dose(s) Inhalation two times a day  glucagon  Injectable 1 milliGRAM(s) IntraMuscular once  hydrALAZINE 10 milliGRAM(s) Oral three times a day  insulin glargine Injectable (LANTUS) 20 Unit(s) SubCutaneous at bedtime  insulin lispro (ADMELOG) corrective regimen sliding scale   SubCutaneous three times a day before meals  insulin lispro Injectable (ADMELOG) 3 Unit(s) SubCutaneous three times a day before meals  isosorbide   dinitrate Tablet (ISORDIL) 10 milliGRAM(s) Oral three times a day  metoprolol succinate ER 50 milliGRAM(s) Oral daily  polyethylene glycol 3350 17 Gram(s) Oral two times a day  rivaroxaban 15 milliGRAM(s) Oral with dinner  senna 2 Tablet(s) Oral at bedtime  sodium chloride 0.65% Nasal 1 Spray(s) Both Nostrils two times a day  tamsulosin 0.4 milliGRAM(s) Oral at bedtime  tiotropium 2.5 MICROgram(s) Inhaler 2 Puff(s) Inhalation daily  torsemide 20 milliGRAM(s) Oral two times a day    MEDICATIONS  (PRN):  acetaminophen     Tablet .. 650 milliGRAM(s) Oral every 6 hours PRN Temp greater or equal to 38C (100.4F), Mild Pain (1 - 3)  albuterol/ipratropium for Nebulization 3 milliLiter(s) Nebulizer every 6 hours PRN Shortness of Breath and/or Wheezing  dextrose Oral Gel 15 Gram(s) Oral once PRN Blood Glucose LESS THAN 70 milliGRAM(s)/deciliter  dextrose Oral Gel 15 Gram(s) Oral once PRN Blood Glucose LESS THAN 70 milliGRAM(s)/deciliter  melatonin 3 milliGRAM(s) Oral at bedtime PRN Insomnia      CAPILLARY BLOOD GLUCOSE      POCT Blood Glucose.: 185 mg/dL (20 Feb 2025 08:35)  POCT Blood Glucose.: 211 mg/dL (19 Feb 2025 21:27)  POCT Blood Glucose.: 163 mg/dL (19 Feb 2025 17:27)  POCT Blood Glucose.: 166 mg/dL (19 Feb 2025 12:44)    I&O's Summary    19 Feb 2025 07:01  -  20 Feb 2025 07:00  --------------------------------------------------------  IN: 1100 mL / OUT: 1280 mL / NET: -180 mL    20 Feb 2025 07:01  -  20 Feb 2025 12:14  --------------------------------------------------------  IN: 180 mL / OUT: 400 mL / NET: -220 mL        PHYSICAL EXAM:  Vital Signs Last 24 Hrs  T(C): 36.6 (20 Feb 2025 11:34), Max: 37.1 (19 Feb 2025 20:39)  T(F): 97.8 (20 Feb 2025 11:34), Max: 98.8 (19 Feb 2025 20:39)  HR: 69 (20 Feb 2025 11:34) (69 - 74)  BP: 100/58 (20 Feb 2025 11:34) (100/58 - 123/71)  BP(mean): --  RR: 18 (20 Feb 2025 11:34) (18 - 19)  SpO2: 97% (20 Feb 2025 11:34) (97% - 99%)    Parameters below as of 20 Feb 2025 11:34  Patient On (Oxygen Delivery Method): nasal cannula      CONSTITUTIONAL: NAD  EYES: PERRLA; conjunctiva and sclera clear  ENMT: MMM  NECK: Supple  RESPIRATORY: Normal respiratory effort; CTAB  CARDIOVASCULAR: RRR, no JVD, ongoing pitting edema, wrapped  ABDOMEN: Nontender to palpation, normoactive BS, no guarding/rigidity  MUSCLOSKELETAL:  no clubbing/cyanosis, no joint swelling or tenderness to palpation  PSYCH: A+O x 3, affect normal  NEUROLOGY: CN 2-12 are intact and symmetric; no gross sensory or motor deficits  SKIN: No rashes; no palpable lesions    LABS:                        10.3   10.23 )-----------( 174      ( 19 Feb 2025 10:07 )             38.3     02-20    136  |  93[L]  |  79[H]  ----------------------------<  210[H]  3.9   |  30  |  2.65[H]    Ca    9.1      20 Feb 2025 07:13  Phos  4.7     02-19  Mg     2.7     02-19            Urinalysis Basic - ( 20 Feb 2025 07:13 )    Color: x / Appearance: x / SG: x / pH: x  Gluc: 210 mg/dL / Ketone: x  / Bili: x / Urobili: x   Blood: x / Protein: x / Nitrite: x   Leuk Esterase: x / RBC: x / WBC x   Sq Epi: x / Non Sq Epi: x / Bacteria: x          RADIOLOGY & ADDITIONAL TESTS:  Results Reviewed:   Imaging Personally Reviewed:  Electrocardiogram Personally Reviewed:    COORDINATION OF CARE:  Care Discussed with Consultants/Other Providers [Y/N]: HF  Prior or Outpatient Records Reviewed [Y/N]:

## 2025-02-20 NOTE — PROGRESS NOTE ADULT - SUBJECTIVE AND OBJECTIVE BOX
No distress, on NC O2, feels better    Vital Signs Last 24 Hrs  T(C): 36.5 (02-20-25 @ 04:30), Max: 37.1 (02-19-25 @ 20:39)  T(F): 97.7 (02-20-25 @ 04:30), Max: 98.8 (02-19-25 @ 20:39)  HR: 69 (02-20-25 @ 04:30) (69 - 74)  BP: 100/60 (02-20-25 @ 04:30) (100/60 - 123/71)  RR: 18 (02-20-25 @ 04:30) (18 - 19)  SpO2: 97% (02-20-25 @ 04:30) (97% - 100%)    I&O's Detail    19 Feb 2025 07:01  -  20 Feb 2025 07:00  --------------------------------------------------------  IN:    Oral Fluid: 1100 mL  Total IN: 1100 mL    OUT:    Voided (mL): 1280 mL  Total OUT: 1280 mL    s1s2  b/l air entry  soft, ND  edema much improved                                                10.3   10.23 )-----------( 174      ( 19 Feb 2025 10:07 )             38.3     20 Feb 2025 07:13    136    |  93     |  79     ----------------------------<  210    3.9     |  30     |  2.65     Ca    9.1        20 Feb 2025 07:13  Phos  4.7       19 Feb 2025 10:07  Mg     2.7       19 Feb 2025 10:07    TPro  7.8    /  Alb  3.6    /  TBili  0.8    /  DBili  x      /  AST  38     /  ALT  21     /  AlkPhos  310    18 Feb 2025 10:55    LIVER FUNCTIONS - ( 18 Feb 2025 10:55 )  Alb: 3.6 g/dL / Pro: 7.8 g/dL / ALK PHOS: 310 U/L / ALT: 21 U/L / AST: 38 U/L / GGT: x           acetaminophen     Tablet .. 650 milliGRAM(s) Oral every 6 hours PRN  albuterol/ipratropium for Nebulization 3 milliLiter(s) Nebulizer every 6 hours PRN  aspirin enteric coated 81 milliGRAM(s) Oral daily  atorvastatin 20 milliGRAM(s) Oral at bedtime  dextrose 5%. 1000 milliLiter(s) IV Continuous <Continuous>  dextrose 5%. 1000 milliLiter(s) IV Continuous <Continuous>  dextrose 50% Injectable 25 Gram(s) IV Push once  dextrose 50% Injectable 12.5 Gram(s) IV Push once  dextrose 50% Injectable 25 Gram(s) IV Push once  dextrose Oral Gel 15 Gram(s) Oral once PRN  dextrose Oral Gel 15 Gram(s) Oral once PRN  fluticasone propionate/ salmeterol 250-50 MICROgram(s) Diskus 1 Dose(s) Inhalation two times a day  glucagon  Injectable 1 milliGRAM(s) IntraMuscular once  insulin glargine Injectable (LANTUS) 20 Unit(s) SubCutaneous at bedtime  insulin lispro (ADMELOG) corrective regimen sliding scale   SubCutaneous three times a day before meals  insulin lispro Injectable (ADMELOG) 3 Unit(s) SubCutaneous three times a day before meals  melatonin 3 milliGRAM(s) Oral at bedtime PRN  metoprolol succinate ER 50 milliGRAM(s) Oral daily  polyethylene glycol 3350 17 Gram(s) Oral two times a day  rivaroxaban 15 milliGRAM(s) Oral with dinner  senna 2 Tablet(s) Oral at bedtime  sodium chloride 0.65% Nasal 1 Spray(s) Both Nostrils two times a day  tamsulosin 0.4 milliGRAM(s) Oral at bedtime  tiotropium 2.5 MICROgram(s) Inhaler 2 Puff(s) Inhalation daily  torsemide 20 milliGRAM(s) Oral two times a day    A/P:    CM, EF ~ 30%, severe TR  Adm to [facility] VS 1/10 w/fluid overload   Tx to [facility] for further management   S/p aggressive diuresis w/improvement   Hemodynamic DENISHA/CKD 3 (baseline Cr ~ 2.)  Would moderate diuretics w/close f/u   F/u BMP, Mg, UO  Avoid nephrotoxins as able  Avoid hypotension     [phone]

## 2025-02-21 LAB
ANION GAP SERPL CALC-SCNC: 15 MMOL/L — SIGNIFICANT CHANGE UP (ref 5–17)
APTT BLD: 34.1 SEC — SIGNIFICANT CHANGE UP (ref 24.5–35.6)
BUN SERPL-MCNC: 83 MG/DL — HIGH (ref 7–23)
CALCIUM SERPL-MCNC: 9.1 MG/DL — SIGNIFICANT CHANGE UP (ref 8.4–10.5)
CHLORIDE SERPL-SCNC: 92 MMOL/L — LOW (ref 96–108)
CO2 SERPL-SCNC: 30 MMOL/L — SIGNIFICANT CHANGE UP (ref 22–31)
CREAT SERPL-MCNC: 3.25 MG/DL — HIGH (ref 0.5–1.3)
EGFR: 18 ML/MIN/1.73M2 — LOW
EGFR: 18 ML/MIN/1.73M2 — LOW
GLUCOSE BLDC GLUCOMTR-MCNC: 117 MG/DL — HIGH (ref 70–99)
GLUCOSE BLDC GLUCOMTR-MCNC: 119 MG/DL — HIGH (ref 70–99)
GLUCOSE BLDC GLUCOMTR-MCNC: 126 MG/DL — HIGH (ref 70–99)
GLUCOSE BLDC GLUCOMTR-MCNC: 199 MG/DL — HIGH (ref 70–99)
GLUCOSE SERPL-MCNC: 110 MG/DL — HIGH (ref 70–99)
HCT VFR BLD CALC: 31.2 % — LOW (ref 39–50)
HCT VFR BLD CALC: 34 % — LOW (ref 39–50)
HGB BLD-MCNC: 8.8 G/DL — LOW (ref 13–17)
HGB BLD-MCNC: 9.6 G/DL — LOW (ref 13–17)
INR BLD: 2.2 RATIO — HIGH (ref 0.85–1.16)
MAGNESIUM SERPL-MCNC: 2.7 MG/DL — HIGH (ref 1.6–2.6)
MCHC RBC-ENTMCNC: 25.1 PG — LOW (ref 27–34)
MCHC RBC-ENTMCNC: 25.8 PG — LOW (ref 27–34)
MCHC RBC-ENTMCNC: 28.2 G/DL — LOW (ref 32–36)
MCHC RBC-ENTMCNC: 28.2 G/DL — LOW (ref 32–36)
MCV RBC AUTO: 89.1 FL — SIGNIFICANT CHANGE UP (ref 80–100)
MCV RBC AUTO: 91.4 FL — SIGNIFICANT CHANGE UP (ref 80–100)
NRBC BLD AUTO-RTO: 0 /100 WBCS — SIGNIFICANT CHANGE UP (ref 0–0)
NRBC BLD AUTO-RTO: 0 /100 WBCS — SIGNIFICANT CHANGE UP (ref 0–0)
PLATELET # BLD AUTO: 160 K/UL — SIGNIFICANT CHANGE UP (ref 150–400)
PLATELET # BLD AUTO: 190 K/UL — SIGNIFICANT CHANGE UP (ref 150–400)
POTASSIUM SERPL-MCNC: 4.1 MMOL/L — SIGNIFICANT CHANGE UP (ref 3.5–5.3)
POTASSIUM SERPL-SCNC: 4.1 MMOL/L — SIGNIFICANT CHANGE UP (ref 3.5–5.3)
PROTHROM AB SERPL-ACNC: 24.9 SEC — HIGH (ref 9.9–13.4)
RBC # BLD: 3.5 M/UL — LOW (ref 4.2–5.8)
RBC # BLD: 3.72 M/UL — LOW (ref 4.2–5.8)
RBC # FLD: 22.7 % — HIGH (ref 10.3–14.5)
RBC # FLD: 22.9 % — HIGH (ref 10.3–14.5)
SODIUM SERPL-SCNC: 137 MMOL/L — SIGNIFICANT CHANGE UP (ref 135–145)
WBC # BLD: 11.23 K/UL — HIGH (ref 3.8–10.5)
WBC # BLD: 11.93 K/UL — HIGH (ref 3.8–10.5)
WBC # FLD AUTO: 11.23 K/UL — HIGH (ref 3.8–10.5)
WBC # FLD AUTO: 11.93 K/UL — HIGH (ref 3.8–10.5)

## 2025-02-21 PROCEDURE — 99233 SBSQ HOSP IP/OBS HIGH 50: CPT

## 2025-02-21 PROCEDURE — 71045 X-RAY EXAM CHEST 1 VIEW: CPT | Mod: 26

## 2025-02-21 PROCEDURE — 99232 SBSQ HOSP IP/OBS MODERATE 35: CPT

## 2025-02-21 RX ORDER — METOPROLOL SUCCINATE 50 MG/1
50 TABLET, EXTENDED RELEASE ORAL DAILY
Refills: 0 | Status: DISCONTINUED | OUTPATIENT
Start: 2025-02-21 | End: 2025-02-23

## 2025-02-21 RX ORDER — ISOSORBDIE DINITRATE 30 MG/1
10 TABLET ORAL THREE TIMES A DAY
Refills: 0 | Status: DISCONTINUED | OUTPATIENT
Start: 2025-02-21 | End: 2025-02-27

## 2025-02-21 RX ADMIN — Medication 1 SPRAY(S): at 17:11

## 2025-02-21 RX ADMIN — ATORVASTATIN CALCIUM 20 MILLIGRAM(S): 80 TABLET, FILM COATED ORAL at 21:53

## 2025-02-21 RX ADMIN — INSULIN LISPRO 3 UNIT(S): 100 INJECTION, SOLUTION INTRAVENOUS; SUBCUTANEOUS at 09:11

## 2025-02-21 RX ADMIN — Medication 20 MILLIGRAM(S): at 06:59

## 2025-02-21 RX ADMIN — Medication 2 TABLET(S): at 21:53

## 2025-02-21 RX ADMIN — INSULIN LISPRO 3 UNIT(S): 100 INJECTION, SOLUTION INTRAVENOUS; SUBCUTANEOUS at 17:38

## 2025-02-21 RX ADMIN — RIVAROXABAN 15 MILLIGRAM(S): 10 TABLET, FILM COATED ORAL at 17:11

## 2025-02-21 RX ADMIN — Medication 1 SPRAY(S): at 06:58

## 2025-02-21 RX ADMIN — METOPROLOL SUCCINATE 50 MILLIGRAM(S): 50 TABLET, EXTENDED RELEASE ORAL at 21:53

## 2025-02-21 RX ADMIN — ISOSORBDIE DINITRATE 10 MILLIGRAM(S): 30 TABLET ORAL at 17:12

## 2025-02-21 RX ADMIN — TIOTROPIUM BROMIDE INHALATION SPRAY 2 PUFF(S): 3.12 SPRAY, METERED RESPIRATORY (INHALATION) at 13:18

## 2025-02-21 RX ADMIN — Medication 1 DOSE(S): at 06:57

## 2025-02-21 RX ADMIN — INSULIN GLARGINE-YFGN 20 UNIT(S): 100 INJECTION, SOLUTION SUBCUTANEOUS at 21:52

## 2025-02-21 RX ADMIN — TAMSULOSIN HYDROCHLORIDE 0.4 MILLIGRAM(S): 0.4 CAPSULE ORAL at 21:53

## 2025-02-21 RX ADMIN — Medication 1 DOSE(S): at 17:10

## 2025-02-21 RX ADMIN — Medication 81 MILLIGRAM(S): at 13:19

## 2025-02-21 RX ADMIN — INSULIN LISPRO 3 UNIT(S): 100 INJECTION, SOLUTION INTRAVENOUS; SUBCUTANEOUS at 13:18

## 2025-02-21 RX ADMIN — Medication 10 MILLIGRAM(S): at 21:53

## 2025-02-21 NOTE — PROGRESS NOTE ADULT - ASSESSMENT
78 y/o male w/ PMHx CAD s/p CABG, HF (combined HFrEF [EF 25%] and HFpEF [G3DD]) w/ ICD, AFib on Xarelto, severe AS s/p TAVR, COPD on 3-4L home O2, CKD4, HTN, HLD, T2DM, and BPH who presents as a transfer from [facility] for HF evaluation. Ongoing CHF exacerbation now off bumex gtt, new DENISHA on CKD

## 2025-02-21 NOTE — PROGRESS NOTE ADULT - SUBJECTIVE AND OBJECTIVE BOX
Contreras Brooks MD  Division of Hospital Medicine  Available on MS teams until 7pm  If no response or off-hours, page [phone]  -------------------------------------    Patient is a 79y old  Male who presents with a chief complaint of HF eval (21 Feb 2025 13:29)      SUBJECTIVE / OVERNIGHT EVENTS: none acute  ADDITIONAL REVIEW OF SYSTEMS: pt more groggy and lethargic today. Noted to cough up a blood clot overnight. No further episodes this morning. Had regular brown BM subsequently per wife.     MEDICATIONS  (STANDING):  aspirin enteric coated 81 milliGRAM(s) Oral daily  atorvastatin 20 milliGRAM(s) Oral at bedtime  dextrose 5%. 1000 milliLiter(s) (100 mL/Hr) IV Continuous <Continuous>  dextrose 5%. 1000 milliLiter(s) (50 mL/Hr) IV Continuous <Continuous>  dextrose 50% Injectable 25 Gram(s) IV Push once  dextrose 50% Injectable 12.5 Gram(s) IV Push once  dextrose 50% Injectable 25 Gram(s) IV Push once  fluticasone propionate/ salmeterol 250-50 MICROgram(s) Diskus 1 Dose(s) Inhalation two times a day  glucagon  Injectable 1 milliGRAM(s) IntraMuscular once  hydrALAZINE 10 milliGRAM(s) Oral three times a day  insulin glargine Injectable (LANTUS) 20 Unit(s) SubCutaneous at bedtime  insulin lispro (ADMELOG) corrective regimen sliding scale   SubCutaneous three times a day before meals  insulin lispro Injectable (ADMELOG) 3 Unit(s) SubCutaneous three times a day before meals  isosorbide   dinitrate Tablet (ISORDIL) 10 milliGRAM(s) Oral three times a day  metoprolol succinate ER 50 milliGRAM(s) Oral daily  polyethylene glycol 3350 17 Gram(s) Oral two times a day  rivaroxaban 15 milliGRAM(s) Oral with dinner  senna 2 Tablet(s) Oral at bedtime  sodium chloride 0.65% Nasal 1 Spray(s) Both Nostrils two times a day  tamsulosin 0.4 milliGRAM(s) Oral at bedtime  tiotropium 2.5 MICROgram(s) Inhaler 2 Puff(s) Inhalation daily    MEDICATIONS  (PRN):  acetaminophen     Tablet .. 650 milliGRAM(s) Oral every 6 hours PRN Temp greater or equal to 38C (100.4F), Mild Pain (1 - 3)  albuterol/ipratropium for Nebulization 3 milliLiter(s) Nebulizer every 6 hours PRN Shortness of Breath and/or Wheezing  dextrose Oral Gel 15 Gram(s) Oral once PRN Blood Glucose LESS THAN 70 milliGRAM(s)/deciliter  dextrose Oral Gel 15 Gram(s) Oral once PRN Blood Glucose LESS THAN 70 milliGRAM(s)/deciliter  melatonin 3 milliGRAM(s) Oral at bedtime PRN Insomnia      CAPILLARY BLOOD GLUCOSE      POCT Blood Glucose.: 119 mg/dL (21 Feb 2025 12:43)  POCT Blood Glucose.: 126 mg/dL (21 Feb 2025 08:58)  POCT Blood Glucose.: 164 mg/dL (20 Feb 2025 20:50)  POCT Blood Glucose.: 167 mg/dL (20 Feb 2025 17:26)    I&O's Summary    20 Feb 2025 07:01  -  21 Feb 2025 07:00  --------------------------------------------------------  IN: 300 mL / OUT: 800 mL / NET: -500 mL    21 Feb 2025 07:01  -  21 Feb 2025 14:39  --------------------------------------------------------  IN: 360 mL / OUT: 400 mL / NET: -40 mL        PHYSICAL EXAM:  Vital Signs Last 24 Hrs  T(C): 36.3 (21 Feb 2025 11:58), Max: 37.2 (20 Feb 2025 19:45)  T(F): 97.3 (21 Feb 2025 11:58), Max: 98.9 (20 Feb 2025 19:45)  HR: 71 (21 Feb 2025 11:58) (69 - 71)  BP: 93/55 (21 Feb 2025 11:58) (93/55 - 101/67)  BP(mean): --  RR: 18 (21 Feb 2025 11:58) (18 - 18)  SpO2: 99% (21 Feb 2025 11:58) (97% - 99%)    Parameters below as of 21 Feb 2025 11:58  Patient On (Oxygen Delivery Method): nasal cannula  O2 Flow (L/min): 3    CONSTITUTIONAL: NAD  EYES: PERRLA; conjunctiva and sclera clear  ENMT: MMM  NECK: Supple  RESPIRATORY: Normal respiratory effort; CTAB  CARDIOVASCULAR: RRR, no JVD, ongoing pitting edema, improved  ABDOMEN: Nontender to palpation, normoactive BS, no guarding/rigidity  MUSCLOSKELETAL:  no clubbing/cyanosis, no joint swelling or tenderness to palpation  PSYCH: A+O x 3, affect normal  NEUROLOGY: CN 2-12 are intact and symmetric; no gross sensory or motor deficits  SKIN: No rashes; no palpable lesions    LABS:                        8.8    11.23 )-----------( 160      ( 21 Feb 2025 04:19 )             31.2     02-21    137  |  92[L]  |  83[H]  ----------------------------<  110[H]  4.1   |  30  |  3.25[H]    Ca    9.1      21 Feb 2025 10:11  Mg     2.7     02-21      PT/INR - ( 21 Feb 2025 04:19 )   PT: 24.9 sec;   INR: 2.20 ratio         PTT - ( 21 Feb 2025 04:19 )  PTT:34.1 sec      Urinalysis Basic - ( 21 Feb 2025 10:11 )    Color: x / Appearance: x / SG: x / pH: x  Gluc: 110 mg/dL / Ketone: x  / Bili: x / Urobili: x   Blood: x / Protein: x / Nitrite: x   Leuk Esterase: x / RBC: x / WBC x   Sq Epi: x / Non Sq Epi: x / Bacteria: x          RADIOLOGY & ADDITIONAL TESTS:  Results Reviewed:   Imaging Personally Reviewed:  Electrocardiogram Personally Reviewed:    COORDINATION OF CARE:  Care Discussed with Consultants/Other Providers [Y/N]: HF, renal  Prior or Outpatient Records Reviewed [Y/N]:

## 2025-02-21 NOTE — CHART NOTE - NSCHARTNOTEFT_GEN_A_CORE
[facility],  Ave    [address]    [facility] [address]    Phone:  [phone]    Fax:  [phone]       Thank You for choosing us for your health care visit. We are glad to serve you and happy to provide you with this summary of your visit. Please help us to ensure we have accurate records. If you find anything that needs to be changed, please let our staff know as soon as possible.          Your Demographic Information     Patient Name Sex     Nathalie Weiss Female 1959       Ethnic Group Patient Race    Not of  or  Origin White      Your Visit Details     Date & Time Provider Department    2017 1:00 PM Irina Lo NP [facility],  Ave      Your Upcoming Appointment*(Max 10)     2017  8:40 AM CDT   Follow-up Visit with Clem Gomez MD   [facility] [address] ([facility] [address])    [address]  [facility] [address] [phone]              Your To Do List     Future Orders Please Complete On or Around Expires    CBC & AUTO DIFFERENTIAL  May 24, 2017 (Approximate) Aug 22, 2017    MAMMO SCREENING BILATERAL  May 24, 2017 (Approximate) Aug 22, 2017    VITAMIN D -25 HYDROXY  May 24, 2017 (Approximate) Aug 22, 2017      We Ordered or Performed the Following     COLOGUARD     URINALYSIS WITH MICRO & CULTURE IF INDICATED       Conditions Discussed Today or Order-Related Diagnoses        Comments    Well woman exam    -  Primary     Vitamin D insufficiency         Essential hypertension         Type 2 diabetes mellitus without complication, without long-term current use of insulin (CMS/[facility])         Hyperlipidemia, unspecified hyperlipidemia type         Special screening for malignant neoplasms, colon         Pain of left thumb           Your Vitals Were     BP Pulse Temp Resp Height Weight    142/78 (BP Location: RUE, Patient Position: Sitting, Cuff Size: Large  Medicine PA Note    Notified by RN patient had an episode of hemoptysis. Pt. seen and examined at bedside W/O any acute complaints at the time. Pt states that he start to cough up blood this morning at 2:30 am. Pt wife who is at bedside states that he had an episode of hemoptysis a few months ago. Pt and wife who is a retired nurse states the hemoptysis is likely due to the Xarelto and ASA use. VSS,  Patient is alert, NAD. Denies Dizziness, CP, SOB, cough, N/V, or abd pain.    VITAL SIGNS:  T(C): 36.8 (02-21-25 @ 00:00), Max: 37.2 (02-20-25 @ 19:45)  HR: 70 (02-21-25 @ 00:00) (69 - 70)  BP: 101/67 (02-21-25 @ 00:00) (98/60 - 101/67)  RR: 18 (02-21-25 @ 00:00) (18 - 18)  SpO2: 98% (02-21-25 @ 00:00) (97% - 98%)  Wt(kg): --      LABORATORY:                          10.3   10.23 )-----------( 174      ( 19 Feb 2025 10:07 )             38.3       02-20    136  |  93[L]  |  79[H]  ----------------------------<  210[H]  3.9   |  30  |  2.65[H]    Ca    9.1      20 Feb 2025 07:13  Phos  4.7     02-19  Mg     2.7     02-19        PHYSICAL EXAM:    Constitutional: AOx3. NAD.    Respiratory: clear lungs bilaterally. No wheezing, rhonchi, or crackles.    Cardiovascular: S1 S2. No murmurs.    Gastrointestinal: BS X4 active. soft. nontender.    Extremities/Vascular: +2 pulses bilaterally. No BLE edema.      ASSESSMENT/PLAN:   HPI:  This is an 78 y/o male w/ PMHx CAD s/p CABG, HF (combined HFrEF [EF 25%] and HFpEF [G3DD]) w/ ICD, AFib on Xarelto, severe AS s/p TAVR, COPD on 3-4L home O2, CKD3, HTN, HLD, T2DM, and BPH who presents as a transfer from [facility] for HF evaluation. He presented to [facility] on 1/10/25 for worsening of his chronic SOB for 2 weeks, with associated increased swelling of legs and abdomen. He was admitted for acute on chronic hypoxic respiratory failure due to both anemia and acute on chronic CHF exacerbation. He was anemic to 6.5 on presentation there and hypoxic requiring Bipap. He was started on bumex drip, then transitioned to IVP lasix 80mg BID, then had to be transitioned to diamox due to his course being complicated by contraction alkalosis and hypokalemia. Then had Bumex 2mg BID started as he was still volume overloaded. He is now back to his baseline O2 requirements and was transferred here for evaluation by HF team per recommendation by cardiology at [facility] due to his persistent SOB with minimal exertion.     Here he is afebrile, hemodynamically stable, saturating 92% on 3LNC. States that he isn't feeling any SOB at the moment. Says his legs are still swollen but they are much better than when he was admitted to [facility].    (05 Feb 2025 21:35)        # Hemoptysis  - CBC, PTT/INR stat  - PLT wnl  - CXR ordered  - VSS, Will consider CT Chest r/o PE  - Collect septum in container at bedside  - PULM Eval in the AM  - Pt on Xarelto & ASA at night, will endorse day whether to consider hold AC if any further hemoptysis episode persists.    - Will monitor for more hemoptysis  - F/U primary team in AM    Follow up with Attending in AM    Monty Patten PA-C   Department of Medicine  [facility] Adult) 68 98 °F (36.7 °C) 16 5' 3\" (1.6 m) 153 lb 1.6 oz (69.4 kg)    BMI Smoking Status                27.12 kg/m2 Former Smoker          Medications Prescribed or Re-Ordered Today     nebivolol (BYSTOLIC) 10 MG tablet    Sig - Route: Take 1 tablet by mouth daily. - Oral    Class: Eprescribe    Pharmacy: [facility]/pharmacy #[id number] - [place], [place] - [address] AT [facility] Ph #: [phone]      Your Current Medications Are        Disp Refills Start End    PROAIR  (90 Base) MCG/ACT inhaler 8.5 Inhaler 0 3/17/2017     Sig: INHALE 2 PUFFS BY MOUTH EVERY 4 HOURS AS NEEDED FOR SHORTNESS OF BREATH OR WHEEZING    Class: Eprescribe    guaiFENesin-codeine (GUAIFENESIN AC) 100-10 MG/5ML liquid 120 mL 0 3/7/2017     Sig - Route: Take 5 mLs by mouth 3 times daily as needed for Cough. - Oral    Notes to Pharmacy: Needs sugar free (is a diabetic)    metformin (GLUCOPHAGE) 1000 MG tablet 180 tablet 1 2017     Sig: TAKE 1 TABLET BY MOUTH 2 TIMES DAILY (WITH MEALS).    Class: Eprescribe    blood glucose test strips (ONE TOUCH ULTRA TEST) 200 strip 3 2016     Si times daily. Test blood sugar 2 times daily as directed. Diagnosis: Controlled Type 2 DM with out complications E11.9    Class: Eprescribe    Lancets (ONETOUCH ULTRASOFT) Misc 100 each 3 2016     Sig: Use to test daily either before breakfast or dinner.    Class: Eprescribe    Azelastine-Fluticasone (DYMISTA NA)        Class: Historical Med    Route: Nasal    aspirin 81 MG tablet   2012     Sig - Route: Take 1 tablet by mouth daily. - Oral    Class: Historical Med    multivitamin (THERAGRAN) per tablet   2012     Sig - Route: Take 1 tablet by mouth daily. - Oral    Class: Historical Med    nebivolol (BYSTOLIC) 10 MG tablet 90 tablet 3 2017     Sig - Route: Take 1 tablet by mouth daily. - Oral    Class: Eprescribe      Discontinued Medications        Reason for Discontinue    cefdinir (OMNICEF) 300 MG capsule Therapy  Completed    cholecalciferol (VITAMIN D3) 1000 UNITS tablet Patient Declined    tobramycin (TOBREX) 0.3 % ophthalmic solution Therapy Completed    trimethoprim-polymyxin b (POLYTRIM) ophthalmic solution Therapy Completed      Allergies     Erythromycin PRURITUS    Tightness in throat      Immunizations History as of 5/24/2017     Name Date    Tdap 7/28/2014  9:53 AM      Problem List as of 5/24/2017     Type 2 diabetes mellitus (CMS/HCC)    Hyperlipemia    Abnormal liver function test    Vitamin D insufficiency    Hypertension    GERD (gastroesophageal reflux disease)    Circumscribed scleroderma(aka LICHEN SCLEROSIS)    Allergic rhinitis due to allergen              Patient Instructions    Remember to include adequate calcium in your diet or with supplements:  Daily calcium intake should be 6095-4593 every day, no more than 500 at one time.  Also should get 1000 to 2000 IU (international units) of Vitamin D daily.  Examples:  4 ounces of yogurt, 8 ounces of milk, 1 ½  slices (or 1 ½ ounces) of cheese all contain about 300 mg of calcium.  Include weight bearing exercise in your routine several times a week.    Continue to monitor record blood pressures at home, goal systolic blood pressure is 130 or less, diastolic 85 or less. Continue Bystolic    Follow-up with Dr. Gomez as scheduled, continue metformin, increase physical activity, healthy eating    Consider switching to Zyrtec instead of loratadine for allergies, take at night can cause drowsiness    Follow-up in clinic or call if left thumb pain worse, consider using splint during activities

## 2025-02-21 NOTE — PROGRESS NOTE ADULT - PROBLEM SELECTOR PLAN 1
: Etiology likely ischemic. Primary cardiologist Dr. Jewel Stubbs ([facility]), last TTE LVEF 25-30%. Appears has been on some GDMT as outpt (?Farxiga, Coreg and previously on ARNI though unclear why stopped)  - Toprol XL 50mg QD, hydral 10/ isordil 10  - hold entresto and dino for DENISHA  - hold diuretics; discussed possibility of RHC with patient, who declined  - Diuretics: hold diuretics, IVC POCUS per nephrology; if collapsible would give 250cc IVF back  - Has ICD in place; Currently a DNR/DNI: trial NIV. Discussed with palliative and at this time wants ICD on as per wife  - Aggressive PT  - Daily Standing weight with PT   - Strict I/O's  - Target electrolyte repletion to target K+ >4.0 and Mg >2.0.  - close HF follow up

## 2025-02-21 NOTE — PROGRESS NOTE ADULT - ASSESSMENT
80 y/o male w/ PMHx CAD s/p CABG, HFrEF (LVIDd 5.2, LVEF 25-30%) w/ DC- ICD, AFib (Xarelto), severe AS s/p TAVR, COPD on 3-4L home O2, CKD3, HTN, HLD, T2DM, and BPH presented to [facility] for ADHF, despite escalation of home diuretics. Now transferred to [facility] for refractory volume overload. His labs notable for stable Scr (though unclear baseline).  Repeat TTE shows severe BiV dysfunction. s/p RRT for hypotension given 500cc bolus on 2/11    GOC clarified and he remains DNR/DNI per wife - wants the ICD on. He was being diuresed well with bumex drip augmented with diamox. POCUS eval of IVC showed normal size with respiratory variability appears more euvolemic. Now creatinine rising with some diarrhea, likely volume down. Patient declining RHC. Will re-POCUS IVC and consider IVF.     Cardiac Studies  TTE 2/7/25 LVIDd 5.3cm, LVEF 30%, no LV thrombus, TAPSE 1.0cm, severely enlarged RV size/RVSF reduced, mod dilated LA, severe dilated RA, TAVR present, mod MR, severe TR, PASP 52, PASP 52, no pericardial effusion, IVC dilated 2.35cm  TTE 8/8/24; LVIDd 5.2, EF 25-30%, global hypokinesis, severe grade III DD, TASPE 1.1, TAVR present in AV position, mld-mod MR, mod TR, PASP 53

## 2025-02-21 NOTE — PROGRESS NOTE ADULT - ATTENDING COMMENTS
Mr. Solares is a 79 years old with history of CAD status post CABG, HFrEF with LVEF 25 to 30% status post ICD, severe AS status post TAVR, initially admitted to [facility] for decompensated heart failure and then transferred to [facility] on Feb 5th due to refractory volume overload.  TTE showed biventricular dysfunction. The patient is DNR/DNI.     Assessment:   Acute on chronic heart failure exacerbation  Acute on chronic kidney disease  CAD s/p CABG  Severe AS s/p TAVR  ICM s/p CRT-D  Permanent atrial fibrillation (V-paced)  Chronic hypoxic respiratory failure 2/2 COPD (on 3L home oxygen)      Plan:   Diuretics were discontinued yesterday in the setting of renal dysfunction.   RHC was recommended for further evaluation but the patient declined.   Continue Toprol, hydralazine and isordil.  Aldactone held in the setting of renal dysfunction.   Continue aspirin and statin for CAD.  Eliquis for atrial fibrillation.  Strict I's and O's. Daily weights. Mr. Solares is a 79 years old with history of CAD status post CABG, HFrEF with LVEF 25 to 30% status post ICD, severe AS status post TAVR, initially admitted to [facility] for decompensated heart failure and then transferred to [facility] on Feb 5th due to refractory volume overload.  TTE showed biventricular dysfunction. The patient is DNR/DNI.     Assessment:   Acute on chronic heart failure exacerbation  Acute on chronic kidney disease  CAD s/p CABG  Severe AS s/p TAVR  ICM s/p CRT-D  Permanent atrial fibrillation (V-paced)  Chronic hypoxic respiratory failure 2/2 COPD (on 3L home oxygen)      Plan:   Diuretics were discontinued yesterday in the setting of renal dysfunction.   RHC was recommended for further evaluation but the patient declined.   Can do a trial of gentle IV hydration.   Continue Toprol, hydralazine and isordil.  Aldactone held in the setting of renal dysfunction.   Continue aspirin and statin for CAD.  Eliquis for atrial fibrillation.  Strict I's and O's. Daily weights. Mr. Solares is a 79 years old with history of CAD status post CABG, HFrEF with LVEF 25 to 30% status post ICD, severe AS status post TAVR, initially admitted to [facility] for decompensated heart failure and then transferred to [facility] on Feb 5th due to refractory volume overload.  TTE showed biventricular dysfunction. The patient is DNR/DNI.     Assessment:   Acute on chronic heart failure exacerbation  Acute on chronic kidney disease  CAD s/p CABG  Severe AS s/p TAVR  ICM s/p CRT-D  Permanent atrial fibrillation (V-paced)  Chronic hypoxic respiratory failure 2/2 COPD (on 3L home oxygen)      Plan:   Diuretics were discontinued yesterday in the setting of renal dysfunction.   RHC was recommended for further evaluation but the patient declined.   Can do a trial of gentle IV hydration.   Please consult cardio renal service.   Continue Toprol, hydralazine and isordil.  Aldactone held in the setting of renal dysfunction.   Continue aspirin and statin for CAD.  Eliquis for atrial fibrillation.  Strict I's and O's. Daily weights.

## 2025-02-21 NOTE — PROGRESS NOTE ADULT - SUBJECTIVE AND OBJECTIVE BOX
Patient seen and examined at bedside.    Overnight Events: hemoptysis, cr rising    REVIEW OF SYSTEMS:  CONSTITUTIONAL: No weakness, fevers or chills  EYES/ENT: No visual changes;  No dysphagia  NECK: No pain or stiffness  RESPIRATORY: No cough, wheezing, hemoptysis; No shortness of breath  CARDIOVASCULAR: No chest pain or palpitations; No lower extremity edema  GASTROINTESTINAL: No abdominal or epigastric pain. No nausea, vomiting, or hematemesis; No diarrhea or constipation. No melena or hematochezia.  BACK: No back pain  GENITOURINARY: No dysuria, frequency or hematuria  NEUROLOGICAL: No numbness or weakness  SKIN: No itching, burning, rashes, or lesions   All other review of systems is negative unless indicated above.            Current Meds:  acetaminophen     Tablet .. 650 milliGRAM(s) Oral every 6 hours PRN  albuterol/ipratropium for Nebulization 3 milliLiter(s) Nebulizer every 6 hours PRN  aspirin enteric coated 81 milliGRAM(s) Oral daily  atorvastatin 20 milliGRAM(s) Oral at bedtime  dextrose 5%. 1000 milliLiter(s) IV Continuous <Continuous>  dextrose 5%. 1000 milliLiter(s) IV Continuous <Continuous>  dextrose 50% Injectable 25 Gram(s) IV Push once  dextrose 50% Injectable 12.5 Gram(s) IV Push once  dextrose 50% Injectable 25 Gram(s) IV Push once  dextrose Oral Gel 15 Gram(s) Oral once PRN  dextrose Oral Gel 15 Gram(s) Oral once PRN  fluticasone propionate/ salmeterol 250-50 MICROgram(s) Diskus 1 Dose(s) Inhalation two times a day  glucagon  Injectable 1 milliGRAM(s) IntraMuscular once  hydrALAZINE 10 milliGRAM(s) Oral three times a day  insulin glargine Injectable (LANTUS) 20 Unit(s) SubCutaneous at bedtime  insulin lispro (ADMELOG) corrective regimen sliding scale   SubCutaneous three times a day before meals  insulin lispro Injectable (ADMELOG) 3 Unit(s) SubCutaneous three times a day before meals  isosorbide   dinitrate Tablet (ISORDIL) 10 milliGRAM(s) Oral three times a day  melatonin 3 milliGRAM(s) Oral at bedtime PRN  metoprolol succinate ER 50 milliGRAM(s) Oral daily  polyethylene glycol 3350 17 Gram(s) Oral two times a day  rivaroxaban 15 milliGRAM(s) Oral with dinner  senna 2 Tablet(s) Oral at bedtime  sodium chloride 0.65% Nasal 1 Spray(s) Both Nostrils two times a day  tamsulosin 0.4 milliGRAM(s) Oral at bedtime  tiotropium 2.5 MICROgram(s) Inhaler 2 Puff(s) Inhalation daily      Vitals:  T(F): 97.3 (02-21), Max: 98.9 (02-20)  HR: 71 (02-21) (69 - 71)  BP: 93/55 (02-21) (93/55 - 101/67)  RR: 18 (02-21)  SpO2: 99% (02-21)  I&O's Summary    20 Feb 2025 07:01  -  21 Feb 2025 07:00  --------------------------------------------------------  IN: 300 mL / OUT: 400 mL / NET: -100 mL    21 Feb 2025 07:01  -  21 Feb 2025 13:29  --------------------------------------------------------  IN: 0 mL / OUT: 425 mL / NET: -425 mL        Physical Exam:  GEN: NAD  HEENT: EOMI, clear sclera  PULM: on NC  CV: RRR S1 S2  ABD: S, NT, ND  EXT: wrapped  PSYCH: normal affect                          8.8    11.23 )-----------( 160      ( 21 Feb 2025 04:19 )             31.2     02-21    137  |  92[L]  |  83[H]  ----------------------------<  110[H]  4.1   |  30  |  3.25[H]    Ca    9.1      21 Feb 2025 10:11  Mg     2.7     02-21      PT/INR - ( 21 Feb 2025 04:19 )   PT: 24.9 sec;   INR: 2.20 ratio         PTT - ( 21 Feb 2025 04:19 )  PTT:34.1 sec              New ECG(s): Personally reviewed    Echo:    Stress Testing:     Cath:    New Imaging:    Interpretation of Telemetry:

## 2025-02-21 NOTE — PROGRESS NOTE ADULT - SUBJECTIVE AND OBJECTIVE BOX
No distress    Vital Signs Last 24 Hrs  T(C): 36.3 (02-21-25 @ 11:58), Max: 37.1 (02-20-25 @ 22:00)  T(F): 97.3 (02-21-25 @ 11:58), Max: 98.7 (02-20-25 @ 22:00)  HR: 70 (02-21-25 @ 17:00) (69 - 71)  BP: 108/66 (02-21-25 @ 17:00) (93/55 - 108/66)  RR: 18 (02-21-25 @ 11:58) (18 - 18)  SpO2: 99% (02-21-25 @ 11:58) (97% - 99%)    I&O's Detail    20 Feb 2025 07:01  -  21 Feb 2025 07:00  --------------------------------------------------------  OUT:    Voided (mL): 800 mL  Total OUT: 800 mL    21 Feb 2025 07:01  -  21 Feb 2025 20:03  --------------------------------------------------------  OUT:    Voided (mL): 700 mL  Total OUT: 700 mL                        9.6    11.93 )-----------( 190      ( 21 Feb 2025 16:24 )             34.0     21 Feb 2025 10:11    137    |  92     |  83     ----------------------------<  110    4.1     |  30     |  3.25     Ca    9.1        21 Feb 2025 10:11  Mg     2.7       21 Feb 2025 10:11    PT/INR - ( 21 Feb 2025 04:19 )   PT: 24.9 sec;   INR: 2.20 ratio      acetaminophen     Tablet .. 650 milliGRAM(s) Oral every 6 hours PRN  albuterol/ipratropium for Nebulization 3 milliLiter(s) Nebulizer every 6 hours PRN  aspirin enteric coated 81 milliGRAM(s) Oral daily  atorvastatin 20 milliGRAM(s) Oral at bedtime  dextrose 5%. 1000 milliLiter(s) IV Continuous <Continuous>  dextrose 5%. 1000 milliLiter(s) IV Continuous <Continuous>  dextrose 50% Injectable 25 Gram(s) IV Push once  dextrose 50% Injectable 12.5 Gram(s) IV Push once  dextrose 50% Injectable 25 Gram(s) IV Push once  dextrose Oral Gel 15 Gram(s) Oral once PRN  dextrose Oral Gel 15 Gram(s) Oral once PRN  fluticasone propionate/ salmeterol 250-50 MICROgram(s) Diskus 1 Dose(s) Inhalation two times a day  glucagon  Injectable 1 milliGRAM(s) IntraMuscular once  hydrALAZINE 10 milliGRAM(s) Oral three times a day  insulin glargine Injectable (LANTUS) 20 Unit(s) SubCutaneous at bedtime  insulin lispro (ADMELOG) corrective regimen sliding scale   SubCutaneous three times a day before meals  insulin lispro Injectable (ADMELOG) 3 Unit(s) SubCutaneous three times a day before meals  isosorbide   dinitrate Tablet (ISORDIL) 10 milliGRAM(s) Oral three times a day  melatonin 3 milliGRAM(s) Oral at bedtime PRN  metoprolol succinate ER 50 milliGRAM(s) Oral daily  polyethylene glycol 3350 17 Gram(s) Oral two times a day  rivaroxaban 15 milliGRAM(s) Oral with dinner  senna 2 Tablet(s) Oral at bedtime  sodium chloride 0.65% Nasal 1 Spray(s) Both Nostrils two times a day  tamsulosin 0.4 milliGRAM(s) Oral at bedtime  tiotropium 2.5 MICROgram(s) Inhaler 2 Puff(s) Inhalation daily    A/P:    CM, EF ~ 30%, severe TR  Adm to [facility] VS 1/10 w/fluid overload   Tx to [facility] for further management   S/p aggressive diuresis w/improvement   However now w/hemodynamic DENISHA/CKD 3 (baseline Cr ~ 2.)  Would hold diuretics   Would avoid IVF for now   F/u BMP, Mg, UO  Prognosis is guarded     [phone]

## 2025-02-21 NOTE — PROGRESS NOTE ADULT - PROBLEM SELECTOR PLAN 1
EF 30% with severe tricuspid regurg and PASP 55mmHG on 2/7/24  - Diuretic: holding torsemide  - BB:  Metoprolol succinate 50mg day  - ARNI/ACE-I/ARB: hold entresto due to worsening DENISHA  - Hydralazine/Nitrate: Add back hydral 10mg TID. Isodril 10mg TID   - MRA:  resumed aldactone  - SGLT2: none   dry wt is 243 lbs

## 2025-02-22 LAB
ALBUMIN SERPL ELPH-MCNC: 3.1 G/DL — LOW (ref 3.3–5)
ALP SERPL-CCNC: 274 U/L — HIGH (ref 40–120)
ALT FLD-CCNC: 16 U/L — SIGNIFICANT CHANGE UP (ref 10–45)
ANION GAP SERPL CALC-SCNC: 12 MMOL/L — SIGNIFICANT CHANGE UP (ref 5–17)
AST SERPL-CCNC: 27 U/L — SIGNIFICANT CHANGE UP (ref 10–40)
BILIRUB SERPL-MCNC: 0.7 MG/DL — SIGNIFICANT CHANGE UP (ref 0.2–1.2)
BUN SERPL-MCNC: 92 MG/DL — HIGH (ref 7–23)
CALCIUM SERPL-MCNC: 8.6 MG/DL — SIGNIFICANT CHANGE UP (ref 8.4–10.5)
CHLORIDE SERPL-SCNC: 92 MMOL/L — LOW (ref 96–108)
CO2 SERPL-SCNC: 29 MMOL/L — SIGNIFICANT CHANGE UP (ref 22–31)
CREAT SERPL-MCNC: 3.02 MG/DL — HIGH (ref 0.5–1.3)
EGFR: 20 ML/MIN/1.73M2 — LOW
EGFR: 20 ML/MIN/1.73M2 — LOW
GLUCOSE BLDC GLUCOMTR-MCNC: 102 MG/DL — HIGH (ref 70–99)
GLUCOSE BLDC GLUCOMTR-MCNC: 122 MG/DL — HIGH (ref 70–99)
GLUCOSE BLDC GLUCOMTR-MCNC: 170 MG/DL — HIGH (ref 70–99)
GLUCOSE BLDC GLUCOMTR-MCNC: 172 MG/DL — HIGH (ref 70–99)
GLUCOSE SERPL-MCNC: 107 MG/DL — HIGH (ref 70–99)
HCT VFR BLD CALC: 32.2 % — LOW (ref 39–50)
HGB BLD-MCNC: 9 G/DL — LOW (ref 13–17)
MCHC RBC-ENTMCNC: 24.9 PG — LOW (ref 27–34)
MCHC RBC-ENTMCNC: 28 G/DL — LOW (ref 32–36)
MCV RBC AUTO: 89.2 FL — SIGNIFICANT CHANGE UP (ref 80–100)
NRBC BLD AUTO-RTO: 0 /100 WBCS — SIGNIFICANT CHANGE UP (ref 0–0)
PLATELET # BLD AUTO: 171 K/UL — SIGNIFICANT CHANGE UP (ref 150–400)
POTASSIUM SERPL-MCNC: 4.2 MMOL/L — SIGNIFICANT CHANGE UP (ref 3.5–5.3)
POTASSIUM SERPL-SCNC: 4.2 MMOL/L — SIGNIFICANT CHANGE UP (ref 3.5–5.3)
PROT SERPL-MCNC: 6.9 G/DL — SIGNIFICANT CHANGE UP (ref 6–8.3)
RBC # BLD: 3.61 M/UL — LOW (ref 4.2–5.8)
RBC # FLD: 22.4 % — HIGH (ref 10.3–14.5)
SODIUM SERPL-SCNC: 133 MMOL/L — LOW (ref 135–145)
WBC # BLD: 10.33 K/UL — SIGNIFICANT CHANGE UP (ref 3.8–10.5)
WBC # FLD AUTO: 10.33 K/UL — SIGNIFICANT CHANGE UP (ref 3.8–10.5)

## 2025-02-22 PROCEDURE — 99233 SBSQ HOSP IP/OBS HIGH 50: CPT

## 2025-02-22 RX ADMIN — TIOTROPIUM BROMIDE INHALATION SPRAY 2 PUFF(S): 3.12 SPRAY, METERED RESPIRATORY (INHALATION) at 14:12

## 2025-02-22 RX ADMIN — Medication 1 DOSE(S): at 05:16

## 2025-02-22 RX ADMIN — Medication 10 MILLIGRAM(S): at 05:15

## 2025-02-22 RX ADMIN — Medication 1 SPRAY(S): at 17:48

## 2025-02-22 RX ADMIN — ISOSORBDIE DINITRATE 10 MILLIGRAM(S): 30 TABLET ORAL at 05:15

## 2025-02-22 RX ADMIN — INSULIN LISPRO 3 UNIT(S): 100 INJECTION, SOLUTION INTRAVENOUS; SUBCUTANEOUS at 17:47

## 2025-02-22 RX ADMIN — INSULIN GLARGINE-YFGN 20 UNIT(S): 100 INJECTION, SOLUTION SUBCUTANEOUS at 21:59

## 2025-02-22 RX ADMIN — POLYETHYLENE GLYCOL 3350 17 GRAM(S): 17 POWDER, FOR SOLUTION ORAL at 05:15

## 2025-02-22 RX ADMIN — Medication 2 TABLET(S): at 22:01

## 2025-02-22 RX ADMIN — INSULIN LISPRO 3 UNIT(S): 100 INJECTION, SOLUTION INTRAVENOUS; SUBCUTANEOUS at 13:34

## 2025-02-22 RX ADMIN — Medication 81 MILLIGRAM(S): at 13:34

## 2025-02-22 RX ADMIN — Medication 1 SPRAY(S): at 05:15

## 2025-02-22 RX ADMIN — INSULIN LISPRO 3 UNIT(S): 100 INJECTION, SOLUTION INTRAVENOUS; SUBCUTANEOUS at 09:03

## 2025-02-22 RX ADMIN — TAMSULOSIN HYDROCHLORIDE 0.4 MILLIGRAM(S): 0.4 CAPSULE ORAL at 22:00

## 2025-02-22 RX ADMIN — INSULIN LISPRO 2: 100 INJECTION, SOLUTION INTRAVENOUS; SUBCUTANEOUS at 17:47

## 2025-02-22 RX ADMIN — RIVAROXABAN 15 MILLIGRAM(S): 10 TABLET, FILM COATED ORAL at 17:48

## 2025-02-22 RX ADMIN — METOPROLOL SUCCINATE 50 MILLIGRAM(S): 50 TABLET, EXTENDED RELEASE ORAL at 05:18

## 2025-02-22 RX ADMIN — ISOSORBDIE DINITRATE 10 MILLIGRAM(S): 30 TABLET ORAL at 10:38

## 2025-02-22 RX ADMIN — ATORVASTATIN CALCIUM 20 MILLIGRAM(S): 80 TABLET, FILM COATED ORAL at 22:00

## 2025-02-22 RX ADMIN — Medication 1 DOSE(S): at 17:47

## 2025-02-22 RX ADMIN — Medication 10 MILLIGRAM(S): at 22:01

## 2025-02-22 NOTE — PROGRESS NOTE ADULT - ASSESSMENT
80 y/o male w/ PMHx CAD s/p CABG, HF (combined HFrEF [EF 25%] and HFpEF [G3DD]) w/ ICD, AFib on Xarelto, severe AS s/p TAVR, COPD on 3-4L home O2, CKD4, HTN, HLD, T2DM, and BPH who presents as a transfer from [facility] for HF evaluation. Ongoing CHF exacerbation now off bumex gtt, new DENISHA on CKD

## 2025-02-22 NOTE — PROGRESS NOTE ADULT - PROBLEM SELECTOR PLAN 1
EF 30% with severe tricuspid regurg and PASP 55mmHG on 2/7/24  - Diuretic: holding torsemide per renal recs   - BB:  Metoprolol succinate 50mg day  - ARNI/ACE-I/ARB: hold entresto due to worsening DENISHA  - Hydralazine/Nitrate: Add back hydral 10mg TID. Isodril 10mg TID   - MRA:  resumed aldactone  - SGLT2: none   dry wt is 243 lbs

## 2025-02-22 NOTE — PROGRESS NOTE ADULT - SUBJECTIVE AND OBJECTIVE BOX
[facility] Division of Hospital Medicine  Malorie Wilkins MD  Available via MS Teams    SUBJECTIVE / OVERNIGHT EVENTS:  no acute events overnight    ADDITIONAL REVIEW OF SYSTEMS:    MEDICATIONS  (STANDING):  aspirin enteric coated 81 milliGRAM(s) Oral daily  atorvastatin 20 milliGRAM(s) Oral at bedtime  dextrose 5%. 1000 milliLiter(s) (50 mL/Hr) IV Continuous <Continuous>  dextrose 5%. 1000 milliLiter(s) (100 mL/Hr) IV Continuous <Continuous>  dextrose 50% Injectable 25 Gram(s) IV Push once  dextrose 50% Injectable 12.5 Gram(s) IV Push once  dextrose 50% Injectable 25 Gram(s) IV Push once  fluticasone propionate/ salmeterol 250-50 MICROgram(s) Diskus 1 Dose(s) Inhalation two times a day  glucagon  Injectable 1 milliGRAM(s) IntraMuscular once  hydrALAZINE 10 milliGRAM(s) Oral three times a day  insulin glargine Injectable (LANTUS) 20 Unit(s) SubCutaneous at bedtime  insulin lispro (ADMELOG) corrective regimen sliding scale   SubCutaneous three times a day before meals  insulin lispro Injectable (ADMELOG) 3 Unit(s) SubCutaneous three times a day before meals  isosorbide   dinitrate Tablet (ISORDIL) 10 milliGRAM(s) Oral three times a day  metoprolol succinate ER 50 milliGRAM(s) Oral daily  polyethylene glycol 3350 17 Gram(s) Oral two times a day  rivaroxaban 15 milliGRAM(s) Oral with dinner  senna 2 Tablet(s) Oral at bedtime  sodium chloride 0.65% Nasal 1 Spray(s) Both Nostrils two times a day  tamsulosin 0.4 milliGRAM(s) Oral at bedtime  tiotropium 2.5 MICROgram(s) Inhaler 2 Puff(s) Inhalation daily    MEDICATIONS  (PRN):  acetaminophen     Tablet .. 650 milliGRAM(s) Oral every 6 hours PRN Temp greater or equal to 38C (100.4F), Mild Pain (1 - 3)  albuterol/ipratropium for Nebulization 3 milliLiter(s) Nebulizer every 6 hours PRN Shortness of Breath and/or Wheezing  dextrose Oral Gel 15 Gram(s) Oral once PRN Blood Glucose LESS THAN 70 milliGRAM(s)/deciliter  dextrose Oral Gel 15 Gram(s) Oral once PRN Blood Glucose LESS THAN 70 milliGRAM(s)/deciliter  melatonin 3 milliGRAM(s) Oral at bedtime PRN Insomnia      I&O's Summary    21 Feb 2025 07:01  -  22 Feb 2025 07:00  --------------------------------------------------------  IN: 360 mL / OUT: 700 mL / NET: -340 mL    22 Feb 2025 07:01  -  22 Feb 2025 14:01  --------------------------------------------------------  IN: 180 mL / OUT: 500 mL / NET: -320 mL        PHYSICAL EXAM:  Vital Signs Last 24 Hrs  T(C): 36.4 (22 Feb 2025 11:43), Max: 36.6 (21 Feb 2025 20:15)  T(F): 97.6 (22 Feb 2025 11:43), Max: 97.9 (22 Feb 2025 00:02)  HR: 66 (22 Feb 2025 11:43) (66 - 74)  BP: 97/59 (22 Feb 2025 11:43) (97/59 - 122/70)  BP(mean): --  RR: 18 (22 Feb 2025 11:43) (18 - 18)  SpO2: 96% (22 Feb 2025 11:43) (96% - 100%)    Parameters below as of 22 Feb 2025 11:43  Patient On (Oxygen Delivery Method): nasal cannula      RESPIRATORY: Normal respiratory effort; CTAB  CARDIOVASCULAR: RRR, no JVD, ongoing pitting edema, improved  ABDOMEN: Nontender to palpation, normoactive BS, no guarding/rigidity  MUSCLOSKELETAL:  no clubbing/cyanosis, no joint swelling or tenderness to palpation  PSYCH: A+O x 3, affect normal  NEUROLOGY: CN 2-12 are intact and symmetric; no gross sensory or motor deficits  SKIN: No rashes; no palpable lesions    LABS:                        9.0    10.33 )-----------( 171      ( 22 Feb 2025 10:33 )             32.2     02-22    133[L]  |  92[L]  |  92[H]  ----------------------------<  107[H]  4.2   |  29  |  3.02[H]    Ca    8.6      22 Feb 2025 10:33  Mg     2.7     02-21    TPro  6.9  /  Alb  3.1[L]  /  TBili  0.7  /  DBili  x   /  AST  27  /  ALT  16  /  AlkPhos  274[H]  02-22    PT/INR - ( 21 Feb 2025 04:19 )   PT: 24.9 sec;   INR: 2.20 ratio         PTT - ( 21 Feb 2025 04:19 )  PTT:34.1 sec      Urinalysis Basic - ( 22 Feb 2025 10:33 )    Color: x / Appearance: x / SG: x / pH: x  Gluc: 107 mg/dL / Ketone: x  / Bili: x / Urobili: x   Blood: x / Protein: x / Nitrite: x   Leuk Esterase: x / RBC: x / WBC x   Sq Epi: x / Non Sq Epi: x / Bacteria: x        SARS-CoV-2: NotDetec (10 Tay 2025 19:29)      RADIOLOGY & ADDITIONAL TESTS:  New Imaging Personally Reviewed Today:  New Electrocardiogram Personally Reviewed Today:  Other Results Reviewed Today:   Prior or Outpatient Records Reviewed Today with Summary:    COORDINATION OF CARE:  Consultant Communication and Details of Discussion (where applicable):     [facility] Division of Hospital Medicine  Malorie Wilkins MD  Available via MS Teams    SUBJECTIVE / OVERNIGHT EVENTS:  no acute events overnight    ADDITIONAL REVIEW OF SYSTEMS:    MEDICATIONS  (STANDING):  aspirin enteric coated 81 milliGRAM(s) Oral daily  atorvastatin 20 milliGRAM(s) Oral at bedtime  dextrose 5%. 1000 milliLiter(s) (50 mL/Hr) IV Continuous <Continuous>  dextrose 5%. 1000 milliLiter(s) (100 mL/Hr) IV Continuous <Continuous>  dextrose 50% Injectable 25 Gram(s) IV Push once  dextrose 50% Injectable 12.5 Gram(s) IV Push once  dextrose 50% Injectable 25 Gram(s) IV Push once  fluticasone propionate/ salmeterol 250-50 MICROgram(s) Diskus 1 Dose(s) Inhalation two times a day  glucagon  Injectable 1 milliGRAM(s) IntraMuscular once  hydrALAZINE 10 milliGRAM(s) Oral three times a day  insulin glargine Injectable (LANTUS) 20 Unit(s) SubCutaneous at bedtime  insulin lispro (ADMELOG) corrective regimen sliding scale   SubCutaneous three times a day before meals  insulin lispro Injectable (ADMELOG) 3 Unit(s) SubCutaneous three times a day before meals  isosorbide   dinitrate Tablet (ISORDIL) 10 milliGRAM(s) Oral three times a day  metoprolol succinate ER 50 milliGRAM(s) Oral daily  polyethylene glycol 3350 17 Gram(s) Oral two times a day  rivaroxaban 15 milliGRAM(s) Oral with dinner  senna 2 Tablet(s) Oral at bedtime  sodium chloride 0.65% Nasal 1 Spray(s) Both Nostrils two times a day  tamsulosin 0.4 milliGRAM(s) Oral at bedtime  tiotropium 2.5 MICROgram(s) Inhaler 2 Puff(s) Inhalation daily    MEDICATIONS  (PRN):  acetaminophen     Tablet .. 650 milliGRAM(s) Oral every 6 hours PRN Temp greater or equal to 38C (100.4F), Mild Pain (1 - 3)  albuterol/ipratropium for Nebulization 3 milliLiter(s) Nebulizer every 6 hours PRN Shortness of Breath and/or Wheezing  dextrose Oral Gel 15 Gram(s) Oral once PRN Blood Glucose LESS THAN 70 milliGRAM(s)/deciliter  dextrose Oral Gel 15 Gram(s) Oral once PRN Blood Glucose LESS THAN 70 milliGRAM(s)/deciliter  melatonin 3 milliGRAM(s) Oral at bedtime PRN Insomnia      I&O's Summary    21 Feb 2025 07:01  -  22 Feb 2025 07:00  --------------------------------------------------------  IN: 360 mL / OUT: 700 mL / NET: -340 mL    22 Feb 2025 07:01  -  22 Feb 2025 14:01  --------------------------------------------------------  IN: 180 mL / OUT: 500 mL / NET: -320 mL        PHYSICAL EXAM:  Vital Signs Last 24 Hrs  T(C): 36.4 (22 Feb 2025 11:43), Max: 36.6 (21 Feb 2025 20:15)  T(F): 97.6 (22 Feb 2025 11:43), Max: 97.9 (22 Feb 2025 00:02)  HR: 66 (22 Feb 2025 11:43) (66 - 74)  BP: 97/59 (22 Feb 2025 11:43) (97/59 - 122/70)  BP(mean): --  RR: 18 (22 Feb 2025 11:43) (18 - 18)  SpO2: 96% (22 Feb 2025 11:43) (96% - 100%)    Parameters below as of 22 Feb 2025 11:43  Patient On (Oxygen Delivery Method): nasal cannula      RESPIRATORY: Normal respiratory effort; CTAB  CARDIOVASCULAR: RRR, no JVD, ongoing pitting edema, improved  ABDOMEN: Nontender to palpation, normoactive BS, no guarding/rigidity  MUSCLOSKELETAL:  no clubbing/cyanosis, no joint swelling or tenderness to palpation    LABS:                        9.0    10.33 )-----------( 171      ( 22 Feb 2025 10:33 )             32.2     02-22    133[L]  |  92[L]  |  92[H]  ----------------------------<  107[H]  4.2   |  29  |  3.02[H]    Ca    8.6      22 Feb 2025 10:33  Mg     2.7     02-21    TPro  6.9  /  Alb  3.1[L]  /  TBili  0.7  /  DBili  x   /  AST  27  /  ALT  16  /  AlkPhos  274[H]  02-22    PT/INR - ( 21 Feb 2025 04:19 )   PT: 24.9 sec;   INR: 2.20 ratio         PTT - ( 21 Feb 2025 04:19 )  PTT:34.1 sec      Urinalysis Basic - ( 22 Feb 2025 10:33 )    Color: x / Appearance: x / SG: x / pH: x  Gluc: 107 mg/dL / Ketone: x  / Bili: x / Urobili: x   Blood: x / Protein: x / Nitrite: x   Leuk Esterase: x / RBC: x / WBC x   Sq Epi: x / Non Sq Epi: x / Bacteria: x        SARS-CoV-2: NotDetec (10 Tay 2025 19:29)      RADIOLOGY & ADDITIONAL TESTS:  New Imaging Personally Reviewed Today:  New Electrocardiogram Personally Reviewed Today:  Other Results Reviewed Today:   Prior or Outpatient Records Reviewed Today with Summary:    COORDINATION OF CARE:  Consultant Communication and Details of Discussion (where applicable):

## 2025-02-23 LAB
ANION GAP SERPL CALC-SCNC: 17 MMOL/L — SIGNIFICANT CHANGE UP (ref 5–17)
BUN SERPL-MCNC: 97 MG/DL — HIGH (ref 7–23)
CALCIUM SERPL-MCNC: 8.7 MG/DL — SIGNIFICANT CHANGE UP (ref 8.4–10.5)
CHLORIDE SERPL-SCNC: 89 MMOL/L — LOW (ref 96–108)
CO2 SERPL-SCNC: 26 MMOL/L — SIGNIFICANT CHANGE UP (ref 22–31)
CREAT SERPL-MCNC: 3.07 MG/DL — HIGH (ref 0.5–1.3)
EGFR: 20 ML/MIN/1.73M2 — LOW
EGFR: 20 ML/MIN/1.73M2 — LOW
GLUCOSE BLDC GLUCOMTR-MCNC: 100 MG/DL — HIGH (ref 70–99)
GLUCOSE BLDC GLUCOMTR-MCNC: 129 MG/DL — HIGH (ref 70–99)
GLUCOSE BLDC GLUCOMTR-MCNC: 181 MG/DL — HIGH (ref 70–99)
GLUCOSE BLDC GLUCOMTR-MCNC: 211 MG/DL — HIGH (ref 70–99)
GLUCOSE SERPL-MCNC: 97 MG/DL — SIGNIFICANT CHANGE UP (ref 70–99)
HCT VFR BLD CALC: 31.7 % — LOW (ref 39–50)
HGB BLD-MCNC: 9.1 G/DL — LOW (ref 13–17)
MCHC RBC-ENTMCNC: 25.6 PG — LOW (ref 27–34)
MCHC RBC-ENTMCNC: 28.7 G/DL — LOW (ref 32–36)
MCV RBC AUTO: 89.3 FL — SIGNIFICANT CHANGE UP (ref 80–100)
NRBC BLD AUTO-RTO: 0 /100 WBCS — SIGNIFICANT CHANGE UP (ref 0–0)
PLATELET # BLD AUTO: 176 K/UL — SIGNIFICANT CHANGE UP (ref 150–400)
POTASSIUM SERPL-MCNC: 4.3 MMOL/L — SIGNIFICANT CHANGE UP (ref 3.5–5.3)
POTASSIUM SERPL-SCNC: 4.3 MMOL/L — SIGNIFICANT CHANGE UP (ref 3.5–5.3)
RBC # BLD: 3.55 M/UL — LOW (ref 4.2–5.8)
RBC # FLD: 22.5 % — HIGH (ref 10.3–14.5)
SODIUM SERPL-SCNC: 132 MMOL/L — LOW (ref 135–145)
WBC # BLD: 10.33 K/UL — SIGNIFICANT CHANGE UP (ref 3.8–10.5)
WBC # FLD AUTO: 10.33 K/UL — SIGNIFICANT CHANGE UP (ref 3.8–10.5)

## 2025-02-23 PROCEDURE — 99232 SBSQ HOSP IP/OBS MODERATE 35: CPT

## 2025-02-23 RX ORDER — METOPROLOL SUCCINATE 50 MG/1
25 TABLET, EXTENDED RELEASE ORAL DAILY
Refills: 0 | Status: DISCONTINUED | OUTPATIENT
Start: 2025-02-24 | End: 2025-02-25

## 2025-02-23 RX ORDER — FOLIC ACID 1 MG/1
1 TABLET ORAL DAILY
Refills: 0 | Status: DISCONTINUED | OUTPATIENT
Start: 2025-02-23 | End: 2025-04-14

## 2025-02-23 RX ADMIN — TIOTROPIUM BROMIDE INHALATION SPRAY 2 PUFF(S): 3.12 SPRAY, METERED RESPIRATORY (INHALATION) at 12:55

## 2025-02-23 RX ADMIN — INSULIN LISPRO 3 UNIT(S): 100 INJECTION, SOLUTION INTRAVENOUS; SUBCUTANEOUS at 12:56

## 2025-02-23 RX ADMIN — INSULIN GLARGINE-YFGN 20 UNIT(S): 100 INJECTION, SOLUTION SUBCUTANEOUS at 21:48

## 2025-02-23 RX ADMIN — Medication 81 MILLIGRAM(S): at 13:05

## 2025-02-23 RX ADMIN — FOLIC ACID 1 MILLIGRAM(S): 1 TABLET ORAL at 12:57

## 2025-02-23 RX ADMIN — Medication 10 MILLIGRAM(S): at 05:51

## 2025-02-23 RX ADMIN — TAMSULOSIN HYDROCHLORIDE 0.4 MILLIGRAM(S): 0.4 CAPSULE ORAL at 21:48

## 2025-02-23 RX ADMIN — INSULIN LISPRO 3 UNIT(S): 100 INJECTION, SOLUTION INTRAVENOUS; SUBCUTANEOUS at 09:06

## 2025-02-23 RX ADMIN — Medication 1 DOSE(S): at 05:50

## 2025-02-23 RX ADMIN — ISOSORBDIE DINITRATE 10 MILLIGRAM(S): 30 TABLET ORAL at 05:50

## 2025-02-23 RX ADMIN — Medication 1 SPRAY(S): at 17:39

## 2025-02-23 RX ADMIN — Medication 1 SPRAY(S): at 05:50

## 2025-02-23 RX ADMIN — ATORVASTATIN CALCIUM 20 MILLIGRAM(S): 80 TABLET, FILM COATED ORAL at 21:48

## 2025-02-23 RX ADMIN — RIVAROXABAN 15 MILLIGRAM(S): 10 TABLET, FILM COATED ORAL at 17:44

## 2025-02-23 RX ADMIN — INSULIN LISPRO 3 UNIT(S): 100 INJECTION, SOLUTION INTRAVENOUS; SUBCUTANEOUS at 18:23

## 2025-02-23 RX ADMIN — INSULIN LISPRO 2: 100 INJECTION, SOLUTION INTRAVENOUS; SUBCUTANEOUS at 17:39

## 2025-02-23 RX ADMIN — Medication 1 DOSE(S): at 17:41

## 2025-02-23 RX ADMIN — POLYETHYLENE GLYCOL 3350 17 GRAM(S): 17 POWDER, FOR SOLUTION ORAL at 05:51

## 2025-02-23 RX ADMIN — Medication 10 MILLIGRAM(S): at 22:34

## 2025-02-23 NOTE — PROGRESS NOTE ADULT - SUBJECTIVE AND OBJECTIVE BOX
[facility] Division of Hospital Medicine  Malorie Wilkins MD  Available via MS Teams    SUBJECTIVE / OVERNIGHT EVENTS:  no acute events overnight    ADDITIONAL REVIEW OF SYSTEMS:    MEDICATIONS  (STANDING):  aspirin enteric coated 81 milliGRAM(s) Oral daily  atorvastatin 20 milliGRAM(s) Oral at bedtime  dextrose 5%. 1000 milliLiter(s) (100 mL/Hr) IV Continuous <Continuous>  dextrose 5%. 1000 milliLiter(s) (50 mL/Hr) IV Continuous <Continuous>  dextrose 50% Injectable 25 Gram(s) IV Push once  dextrose 50% Injectable 12.5 Gram(s) IV Push once  dextrose 50% Injectable 25 Gram(s) IV Push once  fluticasone propionate/ salmeterol 250-50 MICROgram(s) Diskus 1 Dose(s) Inhalation two times a day  folic acid 1 milliGRAM(s) Oral daily  glucagon  Injectable 1 milliGRAM(s) IntraMuscular once  hydrALAZINE 10 milliGRAM(s) Oral three times a day  insulin glargine Injectable (LANTUS) 20 Unit(s) SubCutaneous at bedtime  insulin lispro (ADMELOG) corrective regimen sliding scale   SubCutaneous three times a day before meals  insulin lispro Injectable (ADMELOG) 3 Unit(s) SubCutaneous three times a day before meals  isosorbide   dinitrate Tablet (ISORDIL) 10 milliGRAM(s) Oral three times a day  rivaroxaban 15 milliGRAM(s) Oral with dinner  sodium chloride 0.65% Nasal 1 Spray(s) Both Nostrils two times a day  tamsulosin 0.4 milliGRAM(s) Oral at bedtime  tiotropium 2.5 MICROgram(s) Inhaler 2 Puff(s) Inhalation daily    MEDICATIONS  (PRN):  acetaminophen     Tablet .. 650 milliGRAM(s) Oral every 6 hours PRN Temp greater or equal to 38C (100.4F), Mild Pain (1 - 3)  albuterol/ipratropium for Nebulization 3 milliLiter(s) Nebulizer every 6 hours PRN Shortness of Breath and/or Wheezing  dextrose Oral Gel 15 Gram(s) Oral once PRN Blood Glucose LESS THAN 70 milliGRAM(s)/deciliter  dextrose Oral Gel 15 Gram(s) Oral once PRN Blood Glucose LESS THAN 70 milliGRAM(s)/deciliter  melatonin 3 milliGRAM(s) Oral at bedtime PRN Insomnia      I&O's Summary    22 Feb 2025 07:01  -  23 Feb 2025 07:00  --------------------------------------------------------  IN: 360 mL / OUT: 1400 mL / NET: -1040 mL    23 Feb 2025 07:01  -  23 Feb 2025 21:05  --------------------------------------------------------  IN: 300 mL / OUT: 300 mL / NET: 0 mL        PHYSICAL EXAM:  Vital Signs Last 24 Hrs  T(C): 36.9 (23 Feb 2025 19:11), Max: 36.9 (23 Feb 2025 19:11)  T(F): 98.4 (23 Feb 2025 19:11), Max: 98.4 (23 Feb 2025 19:11)  HR: 71 (23 Feb 2025 19:11) (67 - 79)  BP: 102/65 (23 Feb 2025 19:11) (94/57 - 105/58)  BP(mean): --  RR: 18 (23 Feb 2025 19:11) (18 - 18)  SpO2: 99% (23 Feb 2025 19:11) (96% - 99%)    Parameters below as of 23 Feb 2025 19:11  Patient On (Oxygen Delivery Method): nasal cannula  O2 Flow (L/min): 3    RESPIRATORY: Normal respiratory effort; CTAB  CARDIOVASCULAR: RRR, no JVD, ongoing pitting edema, improved  ABDOMEN: Nontender to palpation, normoactive BS, no guarding/rigidity  MUSCLOSKELETAL:  no clubbing/cyanosis, no joint swelling or tenderness to palpation    LABS:                        9.1    10.33 )-----------( 176      ( 23 Feb 2025 07:00 )             31.7     02-23    132[L]  |  89[L]  |  97[H]  ----------------------------<  97  4.3   |  26  |  3.07[H]    Ca    8.7      23 Feb 2025 06:59    TPro  6.9  /  Alb  3.1[L]  /  TBili  0.7  /  DBili  x   /  AST  27  /  ALT  16  /  AlkPhos  274[H]  02-22          Urinalysis Basic - ( 23 Feb 2025 06:59 )    Color: x / Appearance: x / SG: x / pH: x  Gluc: 97 mg/dL / Ketone: x  / Bili: x / Urobili: x   Blood: x / Protein: x / Nitrite: x   Leuk Esterase: x / RBC: x / WBC x   Sq Epi: x / Non Sq Epi: x / Bacteria: x        SARS-CoV-2: NotDetec (10 Tay 2025 19:29)      RADIOLOGY & ADDITIONAL TESTS:  New Imaging Personally Reviewed Today:  New Electrocardiogram Personally Reviewed Today:  Other Results Reviewed Today:   Prior or Outpatient Records Reviewed Today with Summary:    COORDINATION OF CARE:  Consultant Communication and Details of Discussion (where applicable):

## 2025-02-23 NOTE — PROGRESS NOTE ADULT - ASSESSMENT
80 y/o male w/ PMHx CAD s/p CABG, HF (combined HFrEF [EF 25%] and HFpEF [G3DD]) w/ ICD, AFib on Xarelto, severe AS s/p TAVR, COPD on 3-4L home O2, CKD4, HTN, HLD, T2DM, and BPH who presents as a transfer from [facility] for HF evaluation. Ongoing CHF exacerbation now off bumex gtt, new DENISHA on CKD

## 2025-02-24 LAB
ALBUMIN SERPL ELPH-MCNC: 3.5 G/DL — SIGNIFICANT CHANGE UP (ref 3.3–5)
ALP SERPL-CCNC: 291 U/L — HIGH (ref 40–120)
ALT FLD-CCNC: 17 U/L — SIGNIFICANT CHANGE UP (ref 10–45)
ANION GAP SERPL CALC-SCNC: 18 MMOL/L — HIGH (ref 5–17)
AST SERPL-CCNC: 23 U/L — SIGNIFICANT CHANGE UP (ref 10–40)
BASOPHILS # BLD AUTO: 0.03 K/UL — SIGNIFICANT CHANGE UP (ref 0–0.2)
BASOPHILS NFR BLD AUTO: 0.3 % — SIGNIFICANT CHANGE UP (ref 0–2)
BILIRUB SERPL-MCNC: 0.7 MG/DL — SIGNIFICANT CHANGE UP (ref 0.2–1.2)
BUN SERPL-MCNC: 103 MG/DL — HIGH (ref 7–23)
CALCIUM SERPL-MCNC: 8.7 MG/DL — SIGNIFICANT CHANGE UP (ref 8.4–10.5)
CHLORIDE SERPL-SCNC: 88 MMOL/L — LOW (ref 96–108)
CO2 SERPL-SCNC: 26 MMOL/L — SIGNIFICANT CHANGE UP (ref 22–31)
CREAT SERPL-MCNC: 3.12 MG/DL — HIGH (ref 0.5–1.3)
EGFR: 19 ML/MIN/1.73M2 — LOW
EGFR: 19 ML/MIN/1.73M2 — LOW
EOSINOPHIL # BLD AUTO: 0.16 K/UL — SIGNIFICANT CHANGE UP (ref 0–0.5)
EOSINOPHIL NFR BLD AUTO: 1.5 % — SIGNIFICANT CHANGE UP (ref 0–6)
GLUCOSE BLDC GLUCOMTR-MCNC: 100 MG/DL — HIGH (ref 70–99)
GLUCOSE BLDC GLUCOMTR-MCNC: 102 MG/DL — HIGH (ref 70–99)
GLUCOSE BLDC GLUCOMTR-MCNC: 122 MG/DL — HIGH (ref 70–99)
GLUCOSE BLDC GLUCOMTR-MCNC: 126 MG/DL — HIGH (ref 70–99)
GLUCOSE SERPL-MCNC: 101 MG/DL — HIGH (ref 70–99)
HCT VFR BLD CALC: 32.6 % — LOW (ref 39–50)
HGB BLD-MCNC: 9.5 G/DL — LOW (ref 13–17)
IMM GRANULOCYTES NFR BLD AUTO: 0.7 % — SIGNIFICANT CHANGE UP (ref 0–0.9)
LYMPHOCYTES # BLD AUTO: 0.83 K/UL — LOW (ref 1–3.3)
LYMPHOCYTES # BLD AUTO: 8 % — LOW (ref 13–44)
MAGNESIUM SERPL-MCNC: 3 MG/DL — HIGH (ref 1.6–2.6)
MCHC RBC-ENTMCNC: 26 PG — LOW (ref 27–34)
MCHC RBC-ENTMCNC: 29.1 G/DL — LOW (ref 32–36)
MCV RBC AUTO: 89.1 FL — SIGNIFICANT CHANGE UP (ref 80–100)
MONOCYTES # BLD AUTO: 1.24 K/UL — HIGH (ref 0–0.9)
MONOCYTES NFR BLD AUTO: 11.9 % — SIGNIFICANT CHANGE UP (ref 2–14)
NEUTROPHILS # BLD AUTO: 8.07 K/UL — HIGH (ref 1.8–7.4)
NEUTROPHILS NFR BLD AUTO: 77.6 % — HIGH (ref 43–77)
NRBC BLD AUTO-RTO: 0 /100 WBCS — SIGNIFICANT CHANGE UP (ref 0–0)
PLATELET # BLD AUTO: 191 K/UL — SIGNIFICANT CHANGE UP (ref 150–400)
POTASSIUM SERPL-MCNC: 4.4 MMOL/L — SIGNIFICANT CHANGE UP (ref 3.5–5.3)
POTASSIUM SERPL-SCNC: 4.4 MMOL/L — SIGNIFICANT CHANGE UP (ref 3.5–5.3)
PROT SERPL-MCNC: 7.3 G/DL — SIGNIFICANT CHANGE UP (ref 6–8.3)
RBC # BLD: 3.66 M/UL — LOW (ref 4.2–5.8)
RBC # FLD: 22.5 % — HIGH (ref 10.3–14.5)
SODIUM SERPL-SCNC: 132 MMOL/L — LOW (ref 135–145)
WBC # BLD: 10.4 K/UL — SIGNIFICANT CHANGE UP (ref 3.8–10.5)
WBC # FLD AUTO: 10.4 K/UL — SIGNIFICANT CHANGE UP (ref 3.8–10.5)

## 2025-02-24 PROCEDURE — 99233 SBSQ HOSP IP/OBS HIGH 50: CPT

## 2025-02-24 PROCEDURE — 99232 SBSQ HOSP IP/OBS MODERATE 35: CPT

## 2025-02-24 RX ORDER — BUMETANIDE 1 MG/1
2 TABLET ORAL ONCE
Refills: 0 | Status: COMPLETED | OUTPATIENT
Start: 2025-02-24 | End: 2025-02-24

## 2025-02-24 RX ORDER — SODIUM CHLORIDE 3 G/100ML
150 INJECTION, SOLUTION INTRAVENOUS ONCE
Refills: 0 | Status: COMPLETED | OUTPATIENT
Start: 2025-02-24 | End: 2025-02-24

## 2025-02-24 RX ORDER — BUMETANIDE 1 MG/1
2 TABLET ORAL
Refills: 0 | Status: DISCONTINUED | OUTPATIENT
Start: 2025-02-24 | End: 2025-03-03

## 2025-02-24 RX ORDER — BUMETANIDE 1 MG/1
2 TABLET ORAL ONCE
Refills: 0 | Status: DISCONTINUED | OUTPATIENT
Start: 2025-02-24 | End: 2025-02-24

## 2025-02-24 RX ADMIN — Medication 1 DOSE(S): at 05:52

## 2025-02-24 RX ADMIN — Medication 1 DOSE(S): at 17:29

## 2025-02-24 RX ADMIN — SODIUM CHLORIDE 300 MILLILITER(S): 3 INJECTION, SOLUTION INTRAVENOUS at 14:23

## 2025-02-24 RX ADMIN — RIVAROXABAN 15 MILLIGRAM(S): 10 TABLET, FILM COATED ORAL at 17:30

## 2025-02-24 RX ADMIN — ATORVASTATIN CALCIUM 20 MILLIGRAM(S): 80 TABLET, FILM COATED ORAL at 21:40

## 2025-02-24 RX ADMIN — Medication 10 MILLIGRAM(S): at 21:40

## 2025-02-24 RX ADMIN — FOLIC ACID 1 MILLIGRAM(S): 1 TABLET ORAL at 13:00

## 2025-02-24 RX ADMIN — ISOSORBDIE DINITRATE 10 MILLIGRAM(S): 30 TABLET ORAL at 13:00

## 2025-02-24 RX ADMIN — Medication 81 MILLIGRAM(S): at 14:24

## 2025-02-24 RX ADMIN — TIOTROPIUM BROMIDE INHALATION SPRAY 2 PUFF(S): 3.12 SPRAY, METERED RESPIRATORY (INHALATION) at 13:00

## 2025-02-24 RX ADMIN — BUMETANIDE 2 MILLIGRAM(S): 1 TABLET ORAL at 20:36

## 2025-02-24 RX ADMIN — INSULIN LISPRO 3 UNIT(S): 100 INJECTION, SOLUTION INTRAVENOUS; SUBCUTANEOUS at 17:29

## 2025-02-24 RX ADMIN — INSULIN LISPRO 3 UNIT(S): 100 INJECTION, SOLUTION INTRAVENOUS; SUBCUTANEOUS at 09:10

## 2025-02-24 RX ADMIN — INSULIN GLARGINE-YFGN 20 UNIT(S): 100 INJECTION, SOLUTION SUBCUTANEOUS at 21:43

## 2025-02-24 RX ADMIN — METOPROLOL SUCCINATE 25 MILLIGRAM(S): 50 TABLET, EXTENDED RELEASE ORAL at 06:50

## 2025-02-24 RX ADMIN — ISOSORBDIE DINITRATE 10 MILLIGRAM(S): 30 TABLET ORAL at 17:36

## 2025-02-24 RX ADMIN — Medication 1 SPRAY(S): at 17:28

## 2025-02-24 RX ADMIN — Medication 1 SPRAY(S): at 05:52

## 2025-02-24 RX ADMIN — Medication 10 MILLIGRAM(S): at 15:47

## 2025-02-24 RX ADMIN — BUMETANIDE 2 MILLIGRAM(S): 1 TABLET ORAL at 14:24

## 2025-02-24 RX ADMIN — ISOSORBDIE DINITRATE 10 MILLIGRAM(S): 30 TABLET ORAL at 05:52

## 2025-02-24 RX ADMIN — Medication 10 MILLIGRAM(S): at 05:52

## 2025-02-24 RX ADMIN — INSULIN LISPRO 3 UNIT(S): 100 INJECTION, SOLUTION INTRAVENOUS; SUBCUTANEOUS at 12:59

## 2025-02-24 RX ADMIN — TAMSULOSIN HYDROCHLORIDE 0.4 MILLIGRAM(S): 0.4 CAPSULE ORAL at 21:40

## 2025-02-24 NOTE — PROGRESS NOTE ADULT - PROBLEM SELECTOR PLAN 1
EF 30% with severe tricuspid regurg and PASP 55mmHG on 2/7/24  - Diuretic: IV bumex 2 mg BID and HTS x1 per HF recs  - BB:  Metoprolol succinate 50mg day  - ARNI/ACE-I/ARB: hold entresto due to DENISHA  - Hydralazine/Nitrate: hydral 10mg TID. Isordil 10mg TID   - MRA: aldactone  - SGLT2: none   dry wt is 243 lbs  - daily standing weights

## 2025-02-24 NOTE — PROGRESS NOTE ADULT - SUBJECTIVE AND OBJECTIVE BOX
No distress, on NC O2    Vital Signs Last 24 Hrs  T(C): 36.7 (02-24-25 @ 12:18), Max: 36.9 (02-23-25 @ 19:11)  T(F): 98.1 (02-24-25 @ 12:18), Max: 98.5 (02-24-25 @ 00:14)  HR: 71 (02-24-25 @ 12:18) (70 - 73)  BP: 108/65 (02-24-25 @ 12:18) (102/65 - 114/68)  RR: 18 (02-24-25 @ 12:18) (18 - 19)  SpO2: 99% (02-24-25 @ 12:18) (95% - 99%)    I&O's Detail    23 Feb 2025 07:01  -  24 Feb 2025 07:00  --------------------------------------------------------  IN:    Oral Fluid: 300 mL  Total IN: 300 mL    OUT:    Voided (mL): 300 mL  Total OUT: 300 mL    s1s2  b/l air entry  soft, ND  sm edema                                9.5    10.40 )-----------( 191      ( 24 Feb 2025 07:22 )             32.6     24 Feb 2025 07:22    132    |  88     |  103    ----------------------------<  101    4.4     |  26     |  3.12     Ca    8.7        24 Feb 2025 07:22  Mg     3.0       24 Feb 2025 07:22    TPro  7.3    /  Alb  3.5    /  TBili  0.7    /  DBili  x      /  AST  23     /  ALT  17     /  AlkPhos  291    24 Feb 2025 07:22    LIVER FUNCTIONS - ( 24 Feb 2025 07:22 )  Alb: 3.5 g/dL / Pro: 7.3 g/dL / ALK PHOS: 291 U/L / ALT: 17 U/L / AST: 23 U/L / GGT: x           acetaminophen     Tablet .. 650 milliGRAM(s) Oral every 6 hours PRN  albuterol/ipratropium for Nebulization 3 milliLiter(s) Nebulizer every 6 hours PRN  aspirin enteric coated 81 milliGRAM(s) Oral daily  atorvastatin 20 milliGRAM(s) Oral at bedtime  buMETAnide Injectable 2 milliGRAM(s) IV Push once  buMETAnide Injectable 2 milliGRAM(s) IV Push two times a day  dextrose 5%. 1000 milliLiter(s) IV Continuous <Continuous>  dextrose 5%. 1000 milliLiter(s) IV Continuous <Continuous>  dextrose 50% Injectable 25 Gram(s) IV Push once  dextrose 50% Injectable 12.5 Gram(s) IV Push once  dextrose 50% Injectable 25 Gram(s) IV Push once  dextrose Oral Gel 15 Gram(s) Oral once PRN  dextrose Oral Gel 15 Gram(s) Oral once PRN  fluticasone propionate/ salmeterol 250-50 MICROgram(s) Diskus 1 Dose(s) Inhalation two times a day  folic acid 1 milliGRAM(s) Oral daily  glucagon  Injectable 1 milliGRAM(s) IntraMuscular once  hydrALAZINE 10 milliGRAM(s) Oral three times a day  insulin glargine Injectable (LANTUS) 20 Unit(s) SubCutaneous at bedtime  insulin lispro (ADMELOG) corrective regimen sliding scale   SubCutaneous three times a day before meals  insulin lispro Injectable (ADMELOG) 3 Unit(s) SubCutaneous three times a day before meals  isosorbide   dinitrate Tablet (ISORDIL) 10 milliGRAM(s) Oral three times a day  melatonin 3 milliGRAM(s) Oral at bedtime PRN  metoprolol succinate ER 25 milliGRAM(s) Oral daily  rivaroxaban 15 milliGRAM(s) Oral with dinner  sodium chloride 0.65% Nasal 1 Spray(s) Both Nostrils two times a day  sodium chloride 3% Bolus 150 milliLiter(s) IV Bolus once  tamsulosin 0.4 milliGRAM(s) Oral at bedtime  tiotropium 2.5 MICROgram(s) Inhaler 2 Puff(s) Inhalation daily    A/P:    CM, EF ~ 30%, severe TR  Adm to [facility] VS 1/10 w/fluid overload   Tx to [facility] 2/5 for further management   S/p aggressive diuresis w/improvement   Now w/hemodynamic cardio-renal DENISHA/CKD 3 (baseline Cr ~ 2.)  Diuresis per HF team  F/u BMP, Mg, UO  Avoid nephrotoxins   Avoid hypotension   Prognosis is guarded overall  D/w family at bedside   Per pt and family wishes, no HD if/when became necessary     [phone]

## 2025-02-24 NOTE — PROGRESS NOTE ADULT - ATTENDING COMMENTS
79-year-old male past medical history ischemic cardiomyopathy status post CABG, HFrEF, COPD, hypertension, hyperlipidemia, diabetes, CKD presenting with ADHF.  Course has been complicated by fluctuating kidney function in the setting of diuresis.    Overall prognosis is poor given multiple co-morbidities and age. He was offered RHC/CardioMEMs but declined.  He is now DNR/DNI.  His volume status has been difficult to manage in the setting of fluctuating kidney disease. Cre today ~3.    On exam he is warm and well-perfused.  He is moderately hypervolemic with JVP ~12 cm.  He has not been receiving diuretics for the past few days.  Recommend restarting IV Bumex 2 mg twice daily and adding hypertonic saline given hyponatremia.  GDMT is limited due to CKD.  BP with room for increase in afterload reduction.  Recommend increasing hydralazine 25 mg 3 times daily and adding Isordil 10 mg 3 times daily.  Continue metoprolol XL 50 mg daily.    Loc Pride MD  Interventional Cardiology/Advanced Heart Failure Transplant

## 2025-02-24 NOTE — PROGRESS NOTE ADULT - ASSESSMENT
80 y/o male w/ PMHx CAD s/p CABG, HF (combined HFrEF [EF 25%] and HFpEF [G3DD]) w/ ICD, AFib on Xarelto, severe AS s/p TAVR, COPD on 3-4L home O2, CKD4, HTN, HLD, T2DM, and BPH who presents as a transfer from [facility] for HF evaluation. Ongoing CHF exacerbation now off bumex gtt, new DENISHA on CKD

## 2025-02-24 NOTE — PROGRESS NOTE ADULT - PROBLEM SELECTOR PLAN 1
: Etiology likely ischemic. Primary cardiologist Dr. Jewel Stubbs ([facility]), last TTE LVEF 25-30%. Appears has been on some GDMT as outpt (?Farxiga, Coreg and previously on ARNI though unclear why stopped)  - Toprol XL 50mg QD, hydral 10/ isordil 10  - hold entresto and dino for DENISHA  - hold diuretics; discussed possibility of RHC with patient, who declined  - Has ICD in place; Currently a DNR/DNI: trial NIV. Discussed with palliative and at this time wants ICD on as per wife  - Aggressive PT  - Daily Standing weight with PT   - Strict I/O's  - Target electrolyte repletion to target K+ >4.0 and Mg >2.0.  - close HF follow up : Etiology likely ischemic. Primary cardiologist Dr. Jewel Stubbs ([facility]), last TTE LVEF 25-30%. Appears has been on some GDMT as outpt (?Farxiga, Coreg and previously on ARNI though unclear why stopped)  - Toprol XL 50mg QD  - increase hydral to 25 as tolerated, continue isordil 10  - hold entresto and dino for DENISHA  - hold diuretics; discussed possibility of RHC with patient, who declined  - Has ICD in place; Currently a DNR/DNI: trial NIV. Discussed with palliative and at this time wants ICD on as per wife  - Aggressive PT  - Daily Standing weight with PT   - Strict I/O's  - Target electrolyte repletion to target K+ >4.0 and Mg >2.0.  - close HF follow up  - likely not a good candidate for MEMs : Etiology likely ischemic. Primary cardiologist Dr. Jewel Stubbs ([facility]), last TTE LVEF 25-30%. Appears has been on some GDMT as outpt (?Farxiga, Coreg and previously on ARNI though unclear why stopped)  - Toprol XL 50mg QD  - increase hydral to 25 as tolerated, continue isordil 10  - hold entresto and dino for DENISHA  -JVD higher today, give bumex 2mg IV BID and diamox x1, will reassess tomorrow  - Has ICD in place; Currently a DNR/DNI: trial NIV. Discussed with palliative and at this time wants ICD on as per wife  - Aggressive PT  - Daily Standing weight with PT   - Strict I/O's  - Target electrolyte repletion to target K+ >4.0 and Mg >2.0.  - close HF follow up  - likely not a good candidate for MEMs : Etiology likely ischemic. Primary cardiologist Dr. Jewel Stubbs ([facility]), last TTE LVEF 25-30%. Appears has been on some GDMT as outpt (?Farxiga, Coreg and previously on ARNI though unclear why stopped)  - Toprol XL 50mg QD  - increase hydral to 25 as tolerated, continue isordil 10  - hold entresto and dino for DENISHA  -JVD higher today, give bumex 2mg IV BID and 3% normal saline 150cc IV x1, will reassess tomorrow  - Has ICD in place; Currently a DNR/DNI: trial NIV. Discussed with palliative and at this time wants ICD on as per wife  - Aggressive PT  - Daily Standing weight with PT   - Strict I/O's  - Target electrolyte repletion to target K+ >4.0 and Mg >2.0.  - close HF follow up  - likely not a good candidate for MEMs

## 2025-02-24 NOTE — CHART NOTE - NSCHARTNOTEFT_GEN_A_CORE
NUTRITION FOLLOW UP NOTE    PATIENT SEEN FOR: nutrition follow up     SOURCE: [x] Patient  [x] Current Medical Record  [] RN  [x] Family/support person at bedside  [] Patient unavailable/inappropriate  [] Other:    CHART REVIEWED/EVENTS NOTED.  [] No changes to nutrition care plan to note  [x] Nutrition Status:  - Acute on chronic heart failure with reduced ejection fraction  - DENISHA on CKD    DIET ORDER:   Diet, Regular:   Consistent Carbohydrate {No Snacks} (CSTCHO)  DASH/TLC {Sodium & Cholesterol Restricted} (DASH)  Supplement Feeding Modality:  Oral  Glucerna Shake Cans or Servings Per Day:  1       Frequency:  Daily (-)      CURRENT DIET ORDER IS:  [] Appropriate:  [] Inadequate:  [x] Other: see recommendations below     NUTRITION INTAKE/PROVISION:  [x] PO: good appetite reported, reported tolerating current diet consistency, % PO intake per flowsheets   [] Enteral Nutrition:  [] Parenteral Nutrition:    ANTHROPOMETRICS:  Drug Dosing Weight  Height (cm): 177.8 (10 Tay 2025 15:59)  Weight (kg): 107.501 (2025 09:08)  BMI (kg/m2): 34 (2025 09:08)  BSA (m2): 2.24 (2025 09:08)  Weights:   Daily Weight in k (-), Weight in k.9 (-), Weight in k.7 (-), Weight in k (-), Weight in k.3 (-), Weight in k.8 (-), Weight in k.5 (-)   weight fluctuations noted with edema per flowsheets, Dx CHF, diuretics previously in use. weight variances possibly due to fluid shifts vs scale inaccuracies. RD will continue to monitor weight trends as able/available.     MEDICATIONS:  MEDICATIONS  (STANDING):  aspirin enteric coated 81 milliGRAM(s) Oral daily  atorvastatin 20 milliGRAM(s) Oral at bedtime  dextrose 5%. 1000 milliLiter(s) (100 mL/Hr) IV Continuous <Continuous>  dextrose 5%. 1000 milliLiter(s) (50 mL/Hr) IV Continuous <Continuous>  dextrose 50% Injectable 25 Gram(s) IV Push once  dextrose 50% Injectable 12.5 Gram(s) IV Push once  dextrose 50% Injectable 25 Gram(s) IV Push once  fluticasone propionate/ salmeterol 250-50 MICROgram(s) Diskus 1 Dose(s) Inhalation two times a day  folic acid 1 milliGRAM(s) Oral daily  glucagon  Injectable 1 milliGRAM(s) IntraMuscular once  hydrALAZINE 10 milliGRAM(s) Oral three times a day  insulin glargine Injectable (LANTUS) 20 Unit(s) SubCutaneous at bedtime  insulin lispro (ADMELOG) corrective regimen sliding scale   SubCutaneous three times a day before meals  insulin lispro Injectable (ADMELOG) 3 Unit(s) SubCutaneous three times a day before meals  isosorbide   dinitrate Tablet (ISORDIL) 10 milliGRAM(s) Oral three times a day  metoprolol succinate ER 25 milliGRAM(s) Oral daily  rivaroxaban 15 milliGRAM(s) Oral with dinner  sodium chloride 0.65% Nasal 1 Spray(s) Both Nostrils two times a day  tamsulosin 0.4 milliGRAM(s) Oral at bedtime  tiotropium 2.5 MICROgram(s) Inhaler 2 Puff(s) Inhalation daily    MEDICATIONS  (PRN):  acetaminophen     Tablet .. 650 milliGRAM(s) Oral every 6 hours PRN Temp greater or equal to 38C (100.4F), Mild Pain (1 - 3)  albuterol/ipratropium for Nebulization 3 milliLiter(s) Nebulizer every 6 hours PRN Shortness of Breath and/or Wheezing  dextrose Oral Gel 15 Gram(s) Oral once PRN Blood Glucose LESS THAN 70 milliGRAM(s)/deciliter  dextrose Oral Gel 15 Gram(s) Oral once PRN Blood Glucose LESS THAN 70 milliGRAM(s)/deciliter  melatonin 3 milliGRAM(s) Oral at bedtime PRN Insomnia      NUTRITIONALLY PERTINENT LABS:  - Na132 mmol/L[L] Glu 101 mg/dL[H] K+ 4.4 mmol/L Cr  3.12 mg/dL[H]  mg/dL[H] 02-19 Phos 4.7 mg/dL[H] 02-24 Alb 3.5 g/dL-24 ALT 17 U/L AST 23 U/L Alkaline Phosphatase 291 U/L[H]    A1C with Estimated Average Glucose Result: 7.2 % (25 @ 07:55)      Finger Sticks:  POCT Blood Glucose.: 102 mg/dL ( @ 08:47)  POCT Blood Glucose.: 211 mg/dL ( @ 21:13)  POCT Blood Glucose.: 181 mg/dL ( @ 17:11)  POCT Blood Glucose.: 129 mg/dL ( @ 12:39)      NUTRITIONALLY PERTINENT MEDICATIONS/LABS:  [x] Reviewed  [x] Relevant notes on medications/labs:  - elevated fingersticks noted, PMH DM, insulin ordered  - low serum sodium   - diuretics previously in use during admission     EDEMA:  [x] Reviewed  [x] Relevant notes: +edema from  per flowsheets (2+ left/right ankles)     GI/ I&O:  [x] Reviewed  [] Relevant notes:  [x] Other: Pt denies nausea, vomiting, diarrhea, constipation. bowel movement recorded  per flowsheets     SKIN:   [x] No pressure injuries documented, per nursing flowsheet  [] Pressure injury previously noted  [] Change in pressure injury documentation:  [] Other:    ESTIMATED NEEDS:  [x] No change:  [] Updated:  Energy: 0605-0680 kcal/day (25-30 kcal/kg)  Protein: 78-93 g/day (1-1.2 g/kg)  Fluid:   ml/day or [x] defer to team  Based on: IBW 77.5 kg     NUTRITION DIAGNOSIS:  [x] Prior Dx: Food & Nutrition Related Knowledge Deficit, Inadequate Protein Energy Intake  [] New Dx:    EDUCATION:  [x] Yes: discussed dietary menu options compared to current restrictions to promote intake. Offered additional diet education DM, declined. Pt/family was made aware RD remains available and they expressed understanding.    [] Not appropriate/warranted    NUTRITION CARE PLAN:  1. Diet: Continue Consistent Carbohydrate, DASH/TLC therapeutic diet restrictions. Defer fluid restriction to medical team discretion   2. Supplements: Continue Glucerna oral supplement once daily to continue to promote PO intake (provides 220 kcal, 10 grams protein per 8 oz)   3. Monitor PO intake, PO diet tolerance, skin, weight, nutrition related labs, GI function, goals of care     [] Achieved - Continue current nutrition intervention(s)  [] Current medical condition precludes nutrition intervention at this time.    MONITORING AND EVALUATION:   RD remains available upon request and will follow up per protocol.    Yajaira Sierra MS, RDN, CDN; available on Teams

## 2025-02-24 NOTE — PROGRESS NOTE ADULT - SUBJECTIVE AND OBJECTIVE BOX
[facility] Division of Hospital Medicine  Hanane Clark MD  Available via MS Teams    SUBJECTIVE / OVERNIGHT EVENTS:  No acute events overnight. Pt with increased abdominal distention from prior, but otherwise without complaints. Having regular BM.    MEDICATIONS  (STANDING):  aspirin enteric coated 81 milliGRAM(s) Oral daily  atorvastatin 20 milliGRAM(s) Oral at bedtime  buMETAnide Injectable 2 milliGRAM(s) IV Push two times a day  dextrose 5%. 1000 milliLiter(s) (50 mL/Hr) IV Continuous <Continuous>  dextrose 5%. 1000 milliLiter(s) (100 mL/Hr) IV Continuous <Continuous>  dextrose 50% Injectable 25 Gram(s) IV Push once  dextrose 50% Injectable 12.5 Gram(s) IV Push once  dextrose 50% Injectable 25 Gram(s) IV Push once  fluticasone propionate/ salmeterol 250-50 MICROgram(s) Diskus 1 Dose(s) Inhalation two times a day  folic acid 1 milliGRAM(s) Oral daily  glucagon  Injectable 1 milliGRAM(s) IntraMuscular once  hydrALAZINE 10 milliGRAM(s) Oral three times a day  insulin glargine Injectable (LANTUS) 20 Unit(s) SubCutaneous at bedtime  insulin lispro (ADMELOG) corrective regimen sliding scale   SubCutaneous three times a day before meals  insulin lispro Injectable (ADMELOG) 3 Unit(s) SubCutaneous three times a day before meals  isosorbide   dinitrate Tablet (ISORDIL) 10 milliGRAM(s) Oral three times a day  metoprolol succinate ER 25 milliGRAM(s) Oral daily  rivaroxaban 15 milliGRAM(s) Oral with dinner  sodium chloride 0.65% Nasal 1 Spray(s) Both Nostrils two times a day  sodium chloride 3% Bolus 150 milliLiter(s) IV Bolus once  tamsulosin 0.4 milliGRAM(s) Oral at bedtime  tiotropium 2.5 MICROgram(s) Inhaler 2 Puff(s) Inhalation daily    MEDICATIONS  (PRN):  acetaminophen     Tablet .. 650 milliGRAM(s) Oral every 6 hours PRN Temp greater or equal to 38C (100.4F), Mild Pain (1 - 3)  albuterol/ipratropium for Nebulization 3 milliLiter(s) Nebulizer every 6 hours PRN Shortness of Breath and/or Wheezing  dextrose Oral Gel 15 Gram(s) Oral once PRN Blood Glucose LESS THAN 70 milliGRAM(s)/deciliter  dextrose Oral Gel 15 Gram(s) Oral once PRN Blood Glucose LESS THAN 70 milliGRAM(s)/deciliter  melatonin 3 milliGRAM(s) Oral at bedtime PRN Insomnia      I&O's Summary    23 Feb 2025 07:01  -  24 Feb 2025 07:00  --------------------------------------------------------  IN: 300 mL / OUT: 300 mL / NET: 0 mL        PHYSICAL EXAM:  Vital Signs Last 24 Hrs  T(C): 36.4 (24 Feb 2025 04:52), Max: 36.9 (23 Feb 2025 19:11)  T(F): 97.6 (24 Feb 2025 04:52), Max: 98.5 (24 Feb 2025 00:14)  HR: 70 (24 Feb 2025 04:52) (70 - 73)  BP: 103/68 (24 Feb 2025 06:47) (102/65 - 114/68)  BP(mean): --  RR: 18 (24 Feb 2025 04:52) (18 - 19)  SpO2: 95% (24 Feb 2025 04:52) (95% - 99%)    Parameters below as of 24 Feb 2025 04:52  Patient On (Oxygen Delivery Method): nasal cannula  O2 Flow (L/min): 2    CONSTITUTIONAL: NAD  EYES: EOMI, conjunctiva and sclera clear  NECK: Supple  RESPIRATORY: Normal respiratory effort; lungs are clear to auscultation bilaterally  CARDIOVASCULAR: Regular rate and rhythm, no murmur, +BLE edema  ABDOMEN: Nontender to palpation, +distention  PSYCH: A+O to person, place, and time  NEUROLOGY: no FND    LABS:                        9.5    10.40 )-----------( 191      ( 24 Feb 2025 07:22 )             32.6     02-24    132[L]  |  88[L]  |  103[H]  ----------------------------<  101[H]  4.4   |  26  |  3.12[H]    Ca    8.7      24 Feb 2025 07:22  Mg     3.0     02-24    TPro  7.3  /  Alb  3.5  /  TBili  0.7  /  DBili  x   /  AST  23  /  ALT  17  /  AlkPhos  291[H]  02-24          Urinalysis Basic - ( 24 Feb 2025 07:22 )    Color: x / Appearance: x / SG: x / pH: x  Gluc: 101 mg/dL / Ketone: x  / Bili: x / Urobili: x   Blood: x / Protein: x / Nitrite: x   Leuk Esterase: x / RBC: x / WBC x   Sq Epi: x / Non Sq Epi: x / Bacteria: x        SARS-CoV-2: NotDetec (10 Tay 2025 19:29)      RADIOLOGY & ADDITIONAL TESTS:  New Imaging Personally Reviewed Today:  New Electrocardiogram Personally Reviewed Today:  Other Results Reviewed Today:   Prior or Outpatient Records Reviewed Today with Summary:    COORDINATION OF CARE:  Consultant Communication and Details of Discussion (where applicable):

## 2025-02-24 NOTE — PROGRESS NOTE ADULT - SUBJECTIVE AND OBJECTIVE BOX
Patient seen and examined at bedside.    Overnight Events: Cr stable, standing weight 246.9, minimal urine documented    REVIEW OF SYSTEMS:  CONSTITUTIONAL: No weakness, fevers or chills  EYES/ENT: No visual changes;  No dysphagia  NECK: No pain or stiffness  RESPIRATORY: No cough, wheezing, hemoptysis; No shortness of breath  CARDIOVASCULAR: No chest pain or palpitations; No lower extremity edema  GASTROINTESTINAL: No abdominal or epigastric pain. No nausea, vomiting, or hematemesis; No diarrhea or constipation. No melena or hematochezia.  BACK: No back pain  GENITOURINARY: No dysuria, frequency or hematuria  NEUROLOGICAL: No numbness or weakness  SKIN: No itching, burning, rashes, or lesions   All other review of systems is negative unless indicated above.            Current Meds:  acetaminophen     Tablet .. 650 milliGRAM(s) Oral every 6 hours PRN  albuterol/ipratropium for Nebulization 3 milliLiter(s) Nebulizer every 6 hours PRN  aspirin enteric coated 81 milliGRAM(s) Oral daily  atorvastatin 20 milliGRAM(s) Oral at bedtime  dextrose 5%. 1000 milliLiter(s) IV Continuous <Continuous>  dextrose 5%. 1000 milliLiter(s) IV Continuous <Continuous>  dextrose 50% Injectable 25 Gram(s) IV Push once  dextrose 50% Injectable 12.5 Gram(s) IV Push once  dextrose 50% Injectable 25 Gram(s) IV Push once  dextrose Oral Gel 15 Gram(s) Oral once PRN  dextrose Oral Gel 15 Gram(s) Oral once PRN  fluticasone propionate/ salmeterol 250-50 MICROgram(s) Diskus 1 Dose(s) Inhalation two times a day  folic acid 1 milliGRAM(s) Oral daily  glucagon  Injectable 1 milliGRAM(s) IntraMuscular once  hydrALAZINE 10 milliGRAM(s) Oral three times a day  insulin glargine Injectable (LANTUS) 20 Unit(s) SubCutaneous at bedtime  insulin lispro (ADMELOG) corrective regimen sliding scale   SubCutaneous three times a day before meals  insulin lispro Injectable (ADMELOG) 3 Unit(s) SubCutaneous three times a day before meals  isosorbide   dinitrate Tablet (ISORDIL) 10 milliGRAM(s) Oral three times a day  melatonin 3 milliGRAM(s) Oral at bedtime PRN  metoprolol succinate ER 25 milliGRAM(s) Oral daily  rivaroxaban 15 milliGRAM(s) Oral with dinner  sodium chloride 0.65% Nasal 1 Spray(s) Both Nostrils two times a day  tamsulosin 0.4 milliGRAM(s) Oral at bedtime  tiotropium 2.5 MICROgram(s) Inhaler 2 Puff(s) Inhalation daily      Vitals:  T(F): 97.6 (02-24), Max: 98.5 (02-24)  HR: 70 (02-24) (70 - 78)  BP: 103/68 (02-24) (102/65 - 114/68)  RR: 18 (02-24)  SpO2: 95% (02-24)  I&O's Summary    23 Feb 2025 07:01  -  24 Feb 2025 07:00  --------------------------------------------------------  IN: 300 mL / OUT: 300 mL / NET: 0 mL        Physical Exam:  GEN: NAD  HEENT: EOMI, clear sclera  PULM: on NC  CV: RRR S1 S2  ABD: S, NT, ND  EXT: wrapped  PSYCH: normal affect                            9.5    10.40 )-----------( 191      ( 24 Feb 2025 07:22 )             32.6     02-24    132[L]  |  88[L]  |  103[H]  ----------------------------<  101[H]  4.4   |  26  |  3.12[H]    Ca    8.7      24 Feb 2025 07:22  Mg     3.0     02-24    TPro  7.3  /  Alb  3.5  /  TBili  0.7  /  DBili  x   /  AST  23  /  ALT  17  /  AlkPhos  291[H]  02-24

## 2025-02-25 LAB
ANION GAP SERPL CALC-SCNC: 13 MMOL/L — SIGNIFICANT CHANGE UP (ref 5–17)
BUN SERPL-MCNC: 101 MG/DL — HIGH (ref 7–23)
CALCIUM SERPL-MCNC: 8.7 MG/DL — SIGNIFICANT CHANGE UP (ref 8.4–10.5)
CHLORIDE SERPL-SCNC: 91 MMOL/L — LOW (ref 96–108)
CO2 SERPL-SCNC: 29 MMOL/L — SIGNIFICANT CHANGE UP (ref 22–31)
CREAT SERPL-MCNC: 2.47 MG/DL — HIGH (ref 0.5–1.3)
EGFR: 26 ML/MIN/1.73M2 — LOW
EGFR: 26 ML/MIN/1.73M2 — LOW
GLUCOSE BLDC GLUCOMTR-MCNC: 106 MG/DL — HIGH (ref 70–99)
GLUCOSE BLDC GLUCOMTR-MCNC: 149 MG/DL — HIGH (ref 70–99)
GLUCOSE BLDC GLUCOMTR-MCNC: 165 MG/DL — HIGH (ref 70–99)
GLUCOSE BLDC GLUCOMTR-MCNC: 209 MG/DL — HIGH (ref 70–99)
GLUCOSE SERPL-MCNC: 93 MG/DL — SIGNIFICANT CHANGE UP (ref 70–99)
MAGNESIUM SERPL-MCNC: 2.8 MG/DL — HIGH (ref 1.6–2.6)
PHOSPHATE SERPL-MCNC: 6.3 MG/DL — HIGH (ref 2.5–4.5)
POTASSIUM SERPL-MCNC: 4.4 MMOL/L — SIGNIFICANT CHANGE UP (ref 3.5–5.3)
POTASSIUM SERPL-SCNC: 4.4 MMOL/L — SIGNIFICANT CHANGE UP (ref 3.5–5.3)
SODIUM SERPL-SCNC: 133 MMOL/L — LOW (ref 135–145)

## 2025-02-25 PROCEDURE — 99233 SBSQ HOSP IP/OBS HIGH 50: CPT

## 2025-02-25 PROCEDURE — 99232 SBSQ HOSP IP/OBS MODERATE 35: CPT

## 2025-02-25 RX ORDER — METOPROLOL SUCCINATE 50 MG/1
50 TABLET, EXTENDED RELEASE ORAL DAILY
Refills: 0 | Status: DISCONTINUED | OUTPATIENT
Start: 2025-02-26 | End: 2025-03-28

## 2025-02-25 RX ORDER — SODIUM CHLORIDE 3 G/100ML
150 INJECTION, SOLUTION INTRAVENOUS ONCE
Refills: 0 | Status: COMPLETED | OUTPATIENT
Start: 2025-02-25 | End: 2025-02-25

## 2025-02-25 RX ADMIN — INSULIN LISPRO 3 UNIT(S): 100 INJECTION, SOLUTION INTRAVENOUS; SUBCUTANEOUS at 09:45

## 2025-02-25 RX ADMIN — SODIUM CHLORIDE 300 MILLILITER(S): 3 INJECTION, SOLUTION INTRAVENOUS at 15:00

## 2025-02-25 RX ADMIN — Medication 25 MILLIGRAM(S): at 15:42

## 2025-02-25 RX ADMIN — Medication 1 SPRAY(S): at 06:04

## 2025-02-25 RX ADMIN — BUMETANIDE 2 MILLIGRAM(S): 1 TABLET ORAL at 15:03

## 2025-02-25 RX ADMIN — TAMSULOSIN HYDROCHLORIDE 0.4 MILLIGRAM(S): 0.4 CAPSULE ORAL at 21:26

## 2025-02-25 RX ADMIN — Medication 25 MILLIGRAM(S): at 21:26

## 2025-02-25 RX ADMIN — Medication 10 MILLIGRAM(S): at 06:05

## 2025-02-25 RX ADMIN — INSULIN LISPRO 3 UNIT(S): 100 INJECTION, SOLUTION INTRAVENOUS; SUBCUTANEOUS at 13:03

## 2025-02-25 RX ADMIN — BUMETANIDE 2 MILLIGRAM(S): 1 TABLET ORAL at 06:05

## 2025-02-25 RX ADMIN — ISOSORBDIE DINITRATE 10 MILLIGRAM(S): 30 TABLET ORAL at 19:11

## 2025-02-25 RX ADMIN — Medication 1 DOSE(S): at 18:01

## 2025-02-25 RX ADMIN — INSULIN LISPRO 4: 100 INJECTION, SOLUTION INTRAVENOUS; SUBCUTANEOUS at 17:52

## 2025-02-25 RX ADMIN — Medication 1 DOSE(S): at 06:04

## 2025-02-25 RX ADMIN — Medication 1 SPRAY(S): at 18:02

## 2025-02-25 RX ADMIN — FOLIC ACID 1 MILLIGRAM(S): 1 TABLET ORAL at 13:02

## 2025-02-25 RX ADMIN — INSULIN LISPRO 3 UNIT(S): 100 INJECTION, SOLUTION INTRAVENOUS; SUBCUTANEOUS at 17:52

## 2025-02-25 RX ADMIN — Medication 81 MILLIGRAM(S): at 13:02

## 2025-02-25 RX ADMIN — ISOSORBDIE DINITRATE 10 MILLIGRAM(S): 30 TABLET ORAL at 06:05

## 2025-02-25 RX ADMIN — RIVAROXABAN 15 MILLIGRAM(S): 10 TABLET, FILM COATED ORAL at 18:01

## 2025-02-25 RX ADMIN — INSULIN GLARGINE-YFGN 20 UNIT(S): 100 INJECTION, SOLUTION SUBCUTANEOUS at 21:26

## 2025-02-25 RX ADMIN — ATORVASTATIN CALCIUM 20 MILLIGRAM(S): 80 TABLET, FILM COATED ORAL at 21:26

## 2025-02-25 RX ADMIN — METOPROLOL SUCCINATE 25 MILLIGRAM(S): 50 TABLET, EXTENDED RELEASE ORAL at 06:05

## 2025-02-25 RX ADMIN — TIOTROPIUM BROMIDE INHALATION SPRAY 2 PUFF(S): 3.12 SPRAY, METERED RESPIRATORY (INHALATION) at 13:02

## 2025-02-25 NOTE — PROGRESS NOTE ADULT - SUBJECTIVE AND OBJECTIVE BOX
[facility] Division of Hospital Medicine  Hanane Clark MD  Available via MS Teams    SUBJECTIVE / OVERNIGHT EVENTS:  No acute events overnight. Pt reports epistaxis overnight after rubbing nose. Per RN had some bloody sputum this AM. No other complaints this AM.     MEDICATIONS  (STANDING):  aspirin enteric coated 81 milliGRAM(s) Oral daily  atorvastatin 20 milliGRAM(s) Oral at bedtime  buMETAnide Injectable 2 milliGRAM(s) IV Push two times a day  dextrose 5%. 1000 milliLiter(s) (100 mL/Hr) IV Continuous <Continuous>  dextrose 5%. 1000 milliLiter(s) (50 mL/Hr) IV Continuous <Continuous>  dextrose 50% Injectable 25 Gram(s) IV Push once  dextrose 50% Injectable 12.5 Gram(s) IV Push once  dextrose 50% Injectable 25 Gram(s) IV Push once  fluticasone propionate/ salmeterol 250-50 MICROgram(s) Diskus 1 Dose(s) Inhalation two times a day  folic acid 1 milliGRAM(s) Oral daily  glucagon  Injectable 1 milliGRAM(s) IntraMuscular once  hydrALAZINE 25 milliGRAM(s) Oral three times a day  insulin glargine Injectable (LANTUS) 20 Unit(s) SubCutaneous at bedtime  insulin lispro (ADMELOG) corrective regimen sliding scale   SubCutaneous three times a day before meals  insulin lispro Injectable (ADMELOG) 3 Unit(s) SubCutaneous three times a day before meals  isosorbide   dinitrate Tablet (ISORDIL) 10 milliGRAM(s) Oral three times a day  metoprolol succinate ER 25 milliGRAM(s) Oral daily  rivaroxaban 15 milliGRAM(s) Oral with dinner  sodium chloride 0.65% Nasal 1 Spray(s) Both Nostrils two times a day  tamsulosin 0.4 milliGRAM(s) Oral at bedtime  tiotropium 2.5 MICROgram(s) Inhaler 2 Puff(s) Inhalation daily    MEDICATIONS  (PRN):  acetaminophen     Tablet .. 650 milliGRAM(s) Oral every 6 hours PRN Temp greater or equal to 38C (100.4F), Mild Pain (1 - 3)  albuterol/ipratropium for Nebulization 3 milliLiter(s) Nebulizer every 6 hours PRN Shortness of Breath and/or Wheezing  dextrose Oral Gel 15 Gram(s) Oral once PRN Blood Glucose LESS THAN 70 milliGRAM(s)/deciliter  dextrose Oral Gel 15 Gram(s) Oral once PRN Blood Glucose LESS THAN 70 milliGRAM(s)/deciliter  melatonin 3 milliGRAM(s) Oral at bedtime PRN Insomnia      I&O's Summary    24 Feb 2025 07:01  -  25 Feb 2025 07:00  --------------------------------------------------------  IN: 240 mL / OUT: 1700 mL / NET: -1460 mL    25 Feb 2025 07:01  -  25 Feb 2025 11:59  --------------------------------------------------------  IN: 0 mL / OUT: 400 mL / NET: -400 mL        PHYSICAL EXAM:  Vital Signs Last 24 Hrs  T(C): 36.3 (25 Feb 2025 11:54), Max: 36.7 (24 Feb 2025 12:18)  T(F): 97.4 (25 Feb 2025 11:54), Max: 98.1 (24 Feb 2025 12:18)  HR: 71 (25 Feb 2025 11:54) (69 - 71)  BP: 93/48 (25 Feb 2025 11:54) (93/48 - 124/67)  BP(mean): --  RR: 18 (25 Feb 2025 11:54) (18 - 18)  SpO2: 100% (25 Feb 2025 11:54) (95% - 100%)    Parameters below as of 25 Feb 2025 11:54  Patient On (Oxygen Delivery Method): nasal cannula  O2 Flow (L/min): 4    CONSTITUTIONAL: NAD  EYES: EOMI, conjunctiva and sclera clear  NECK: Supple  RESPIRATORY: Normal respiratory effort; lungs are clear to auscultation bilaterally  CARDIOVASCULAR: Regular rate and rhythm, no murmur, +BLE edema  ABDOMEN: Nontender to palpation, +distention  PSYCH: A+O to person, place, and time  NEUROLOGY: no FND    LABS:                        9.5    10.40 )-----------( 191      ( 24 Feb 2025 07:22 )             32.6     02-25    133[L]  |  91[L]  |  101[H]  ----------------------------<  93  4.4   |  29  |  2.47[H]    Ca    8.7      25 Feb 2025 09:21  Phos  6.3     02-25  Mg     2.8     02-25    TPro  7.3  /  Alb  3.5  /  TBili  0.7  /  DBili  x   /  AST  23  /  ALT  17  /  AlkPhos  291[H]  02-24          Urinalysis Basic - ( 25 Feb 2025 09:21 )    Color: x / Appearance: x / SG: x / pH: x  Gluc: 93 mg/dL / Ketone: x  / Bili: x / Urobili: x   Blood: x / Protein: x / Nitrite: x   Leuk Esterase: x / RBC: x / WBC x   Sq Epi: x / Non Sq Epi: x / Bacteria: x        SARS-CoV-2: NotDetec (10 Tay 2025 19:29)      RADIOLOGY & ADDITIONAL TESTS:  New Imaging Personally Reviewed Today:  New Electrocardiogram Personally Reviewed Today:  Other Results Reviewed Today:   Prior or Outpatient Records Reviewed Today with Summary:    COORDINATION OF CARE:  Consultant Communication and Details of Discussion (where applicable):

## 2025-02-25 NOTE — PROGRESS NOTE ADULT - ATTENDING COMMENTS
79-year-old male past medical history ischemic cardiomyopathy status post CABG, HFrEF, COPD, hypertension, hyperlipidemia, diabetes, CKD presenting with ADHF.  Course has been complicated by fluctuating kidney function in the setting of diuresis.    Overall prognosis is poor given multiple co-morbidities and age. He was offered RHC/CardioMEMs but declined.  He is now DNR/DNI.  His volume status has been difficult to manage in the setting of fluctuating kidney disease. Cre improving with diuresis.    On exam he is warm and well-perfused.  He is still moderately hypervolemic with JVP ~12 cm and peripheral edema but responding to IV diuresis. Weight and creatinine trending down. Recommend continuing IV Bumex 2 mg twice daily and hypertonic saline. GDMT is limited due to CKD.  Recommend continuing hydralazine 25 mg 3 times daily and Isordil 10 mg 3 times daily.  Continue metoprolol XL 50 mg daily.    Loc Pride MD  Interventional Cardiology/Advanced Heart Failure Transplant

## 2025-02-25 NOTE — PROGRESS NOTE ADULT - SUBJECTIVE AND OBJECTIVE BOX
No distress, on NC O2    Vital Signs Last 24 Hrs  T(C): 36.6 (02-25-25 @ 04:27), Max: 36.7 (02-24-25 @ 12:18)  T(F): 97.8 (02-25-25 @ 04:27), Max: 98.1 (02-24-25 @ 12:18)  HR: 71 (02-25-25 @ 06:25) (69 - 71)  BP: 113/62 (02-25-25 @ 06:25) (96/53 - 124/67)  RR: 18 (02-25-25 @ 04:27) (18 - 18)  SpO2: 95% (02-25-25 @ 04:27) (95% - 99%)    I&O's Detail    24 Feb 2025 07:01  -  25 Feb 2025 07:00  --------------------------------------------------------  OUT:    Voided (mL): 1700 mL  Total OUT: 1700 mL    25 Feb 2025 07:01  -  25 Feb 2025 11:24  --------------------------------------------------------  OUT:    Voided (mL): 400 mL  Total OUT: 400 mL    s1s2  b/l air entry  soft, ND  sm edema                                        9.5    10.40 )-----------( 191      ( 24 Feb 2025 07:22 )             32.6     25 Feb 2025 09:21    133    |  91     |  101    ----------------------------<  93     4.4     |  29     |  2.47     Ca    8.7        25 Feb 2025 09:21  Phos  6.3       25 Feb 2025 09:21  Mg     2.8       25 Feb 2025 09:21    TPro  7.3    /  Alb  3.5    /  TBili  0.7    /  DBili  x      /  AST  23     /  ALT  17     /  AlkPhos  291    24 Feb 2025 07:22    LIVER FUNCTIONS - ( 24 Feb 2025 07:22 )  Alb: 3.5 g/dL / Pro: 7.3 g/dL / ALK PHOS: 291 U/L / ALT: 17 U/L / AST: 23 U/L / GGT: x           acetaminophen     Tablet .. 650 milliGRAM(s) Oral every 6 hours PRN  albuterol/ipratropium for Nebulization 3 milliLiter(s) Nebulizer every 6 hours PRN  aspirin enteric coated 81 milliGRAM(s) Oral daily  atorvastatin 20 milliGRAM(s) Oral at bedtime  buMETAnide Injectable 2 milliGRAM(s) IV Push two times a day  dextrose 5%. 1000 milliLiter(s) IV Continuous <Continuous>  dextrose 5%. 1000 milliLiter(s) IV Continuous <Continuous>  dextrose 50% Injectable 25 Gram(s) IV Push once  dextrose 50% Injectable 12.5 Gram(s) IV Push once  dextrose 50% Injectable 25 Gram(s) IV Push once  dextrose Oral Gel 15 Gram(s) Oral once PRN  dextrose Oral Gel 15 Gram(s) Oral once PRN  fluticasone propionate/ salmeterol 250-50 MICROgram(s) Diskus 1 Dose(s) Inhalation two times a day  folic acid 1 milliGRAM(s) Oral daily  glucagon  Injectable 1 milliGRAM(s) IntraMuscular once  hydrALAZINE 25 milliGRAM(s) Oral three times a day  insulin glargine Injectable (LANTUS) 20 Unit(s) SubCutaneous at bedtime  insulin lispro (ADMELOG) corrective regimen sliding scale   SubCutaneous three times a day before meals  insulin lispro Injectable (ADMELOG) 3 Unit(s) SubCutaneous three times a day before meals  isosorbide   dinitrate Tablet (ISORDIL) 10 milliGRAM(s) Oral three times a day  melatonin 3 milliGRAM(s) Oral at bedtime PRN  metoprolol succinate ER 25 milliGRAM(s) Oral daily  rivaroxaban 15 milliGRAM(s) Oral with dinner  sodium chloride 0.65% Nasal 1 Spray(s) Both Nostrils two times a day  tamsulosin 0.4 milliGRAM(s) Oral at bedtime  tiotropium 2.5 MICROgram(s) Inhaler 2 Puff(s) Inhalation daily    A/P:    CM, EF ~ 30%, severe TR  Adm to [facility] VS 1/10 w/fluid overload   Tx to [facility] 2/5 for further management   S/p aggressive diuresis w/improvement   Cardio-renal DENISHA/CKD 3 (baseline Cr ~ 2. - 2.5)  Mild hypervolemic hyponatremia   Diuresis per HF team  Cr is improving   Good UO  F/u BMP, Mg, UO  Avoid nephrotoxins, hypotension as possible   Prognosis is guarded overall  D/w family at bedside   Per pt and family wishes, no HD if/when became necessary     [phone]

## 2025-02-25 NOTE — PROGRESS NOTE ADULT - PROBLEM SELECTOR PLAN 1
EF 30% with severe tricuspid regurg and PASP 55mmHG on 2/7/24  - Diuretic: IV bumex 2 mg BID  - BB:  Metoprolol succinate 50mg day  - ARNI/ACE-I/ARB: hold entresto due to DENISHA  - Hydralazine/Nitrate: hydral 10mg TID. Isordil 10mg TID   - MRA: aldactone  - SGLT2: none   dry wt is 243 lbs  - daily standing weights

## 2025-02-25 NOTE — PROGRESS NOTE ADULT - ASSESSMENT
78 y/o male w/ PMHx CAD s/p CABG, HFrEF (LVIDd 5.2, LVEF 25-30%) w/ DC- ICD, AFib (Xarelto), severe AS s/p TAVR, COPD on 3-4L home O2, CKD3, HTN, HLD, T2DM, and BPH presented to [facility] for ADHF, despite escalation of home diuretics. Now transferred to [facility] for refractory volume overload. His labs notable for stable Scr (though unclear baseline).  Repeat TTE shows severe BiV dysfunction. s/p RRT for hypotension given 500cc bolus on 2/11    GOC clarified and he remains DNR/DNI per wife - wants the ICD on. He was being diuresed well with bumex drip augmented with diamox. POCUS eval of IVC showed normal size with respiratory variability appears more euvolemic. Now creatinine rising with some diarrhea, likely volume down. Patient declining RHC. Will re-POCUS IVC and consider IVF.     Cardiac Studies  TTE 2/7/25 LVIDd 5.3cm, LVEF 30%, no LV thrombus, TAPSE 1.0cm, severely enlarged RV size/RVSF reduced, mod dilated LA, severe dilated RA, TAVR present, mod MR, severe TR, PASP 52, PASP 52, no pericardial effusion, IVC dilated 2.35cm  TTE 8/8/24; LVIDd 5.2, EF 25-30%, global hypokinesis, severe grade III DD, TASPE 1.1, TAVR present in AV position, mld-mod MR, mod TR, PASP 53

## 2025-02-25 NOTE — PROGRESS NOTE ADULT - SUBJECTIVE AND OBJECTIVE BOX
Patient seen and examined at bedside.    Overnight Events: good urine output    REVIEW OF SYSTEMS:  CONSTITUTIONAL: No weakness, fevers or chills  EYES/ENT: No visual changes;  No dysphagia  NECK: No pain or stiffness  RESPIRATORY: No cough, wheezing, hemoptysis; No shortness of breath  CARDIOVASCULAR: No chest pain or palpitations; No lower extremity edema  GASTROINTESTINAL: No abdominal or epigastric pain. No nausea, vomiting, or hematemesis; No diarrhea or constipation. No melena or hematochezia.  BACK: No back pain  GENITOURINARY: No dysuria, frequency or hematuria  NEUROLOGICAL: No numbness or weakness  SKIN: No itching, burning, rashes, or lesions   All other review of systems is negative unless indicated above.            Current Meds:  acetaminophen     Tablet .. 650 milliGRAM(s) Oral every 6 hours PRN  albuterol/ipratropium for Nebulization 3 milliLiter(s) Nebulizer every 6 hours PRN  aspirin enteric coated 81 milliGRAM(s) Oral daily  atorvastatin 20 milliGRAM(s) Oral at bedtime  buMETAnide Injectable 2 milliGRAM(s) IV Push two times a day  dextrose 5%. 1000 milliLiter(s) IV Continuous <Continuous>  dextrose 5%. 1000 milliLiter(s) IV Continuous <Continuous>  dextrose 50% Injectable 25 Gram(s) IV Push once  dextrose 50% Injectable 12.5 Gram(s) IV Push once  dextrose 50% Injectable 25 Gram(s) IV Push once  dextrose Oral Gel 15 Gram(s) Oral once PRN  dextrose Oral Gel 15 Gram(s) Oral once PRN  fluticasone propionate/ salmeterol 250-50 MICROgram(s) Diskus 1 Dose(s) Inhalation two times a day  folic acid 1 milliGRAM(s) Oral daily  glucagon  Injectable 1 milliGRAM(s) IntraMuscular once  hydrALAZINE 25 milliGRAM(s) Oral three times a day  insulin glargine Injectable (LANTUS) 20 Unit(s) SubCutaneous at bedtime  insulin lispro (ADMELOG) corrective regimen sliding scale   SubCutaneous three times a day before meals  insulin lispro Injectable (ADMELOG) 3 Unit(s) SubCutaneous three times a day before meals  isosorbide   dinitrate Tablet (ISORDIL) 10 milliGRAM(s) Oral three times a day  melatonin 3 milliGRAM(s) Oral at bedtime PRN  metoprolol succinate ER 25 milliGRAM(s) Oral daily  rivaroxaban 15 milliGRAM(s) Oral with dinner  sodium chloride 0.65% Nasal 1 Spray(s) Both Nostrils two times a day  tamsulosin 0.4 milliGRAM(s) Oral at bedtime  tiotropium 2.5 MICROgram(s) Inhaler 2 Puff(s) Inhalation daily      Vitals:  T(F): 97.4 (02-25), Max: 98.1 (02-24)  HR: 71 (02-25) (69 - 71)  BP: 93/48 (02-25) (93/48 - 124/67)  RR: 18 (02-25)  SpO2: 100% (02-25)  I&O's Summary    24 Feb 2025 07:01  -  25 Feb 2025 07:00  --------------------------------------------------------  IN: 240 mL / OUT: 1700 mL / NET: -1460 mL    25 Feb 2025 07:01  -  25 Feb 2025 12:09  --------------------------------------------------------  IN: 0 mL / OUT: 400 mL / NET: -400 mL        Physical Exam:  GEN: NAD  HEENT: EOMI, clear sclera  PULM: CTA b/l, no wheeze  CV: RRR S1 S2, +JVD  ABD: S, NT, ND  EXT: wrapped  PSYCH: normal affect  SKIN: No rash                          9.5    10.40 )-----------( 191      ( 24 Feb 2025 07:22 )             32.6     02-25    133[L]  |  91[L]  |  101[H]  ----------------------------<  93  4.4   |  29  |  2.47[H]    Ca    8.7      25 Feb 2025 09:21  Phos  6.3     02-25  Mg     2.8     02-25    TPro  7.3  /  Alb  3.5  /  TBili  0.7  /  DBili  x   /  AST  23  /  ALT  17  /  AlkPhos  291[H]  02-24

## 2025-02-25 NOTE — PROGRESS NOTE ADULT - PROBLEM SELECTOR PLAN 1
: Etiology likely ischemic. Primary cardiologist Dr. Jewel Stubbs ([facility]), last TTE LVEF 25-30%. Appears has been on some GDMT as outpt (?Farxiga, Coreg and previously on ARNI though unclear why stopped)  - Toprol XL 50mg QD  - continue hydral 25, continue isordil 10  - hold entresto and dino for DENISHA  - continue bumex 2mg IV BID and 3% normal saline 150cc IV x1  - Has ICD in place; Currently a DNR/DNI: trial NIV. Discussed with palliative and at this time wants ICD on as per wife  - Aggressive PT  - Daily Standing weight with PT   - Strict I/O's  - Target electrolyte repletion to target K+ >4.0 and Mg >2.0.  - close HF follow up  - likely not a good candidate for MEMs

## 2025-02-25 NOTE — PROGRESS NOTE ADULT - ASSESSMENT
78 y/o male w/ PMHx CAD s/p CABG, HF (combined HFrEF [EF 25%] and HFpEF [G3DD]) w/ ICD, AFib on Xarelto, severe AS s/p TAVR, COPD on 3-4L home O2, CKD4, HTN, HLD, T2DM, and BPH who presents as a transfer from [facility] for HF evaluation. Ongoing CHF exacerbation now off bumex gtt, new DENISHA on CKD

## 2025-02-26 LAB
ALBUMIN SERPL ELPH-MCNC: 3.2 G/DL — LOW (ref 3.3–5)
ALP SERPL-CCNC: 244 U/L — HIGH (ref 40–120)
ALT FLD-CCNC: 15 U/L — SIGNIFICANT CHANGE UP (ref 10–45)
ANION GAP SERPL CALC-SCNC: 14 MMOL/L — SIGNIFICANT CHANGE UP (ref 5–17)
AST SERPL-CCNC: 21 U/L — SIGNIFICANT CHANGE UP (ref 10–40)
BASOPHILS # BLD AUTO: 0.02 K/UL — SIGNIFICANT CHANGE UP (ref 0–0.2)
BASOPHILS NFR BLD AUTO: 0.2 % — SIGNIFICANT CHANGE UP (ref 0–2)
BILIRUB SERPL-MCNC: 0.6 MG/DL — SIGNIFICANT CHANGE UP (ref 0.2–1.2)
BUN SERPL-MCNC: 95 MG/DL — HIGH (ref 7–23)
CALCIUM SERPL-MCNC: 8.6 MG/DL — SIGNIFICANT CHANGE UP (ref 8.4–10.5)
CHLORIDE SERPL-SCNC: 94 MMOL/L — LOW (ref 96–108)
CO2 SERPL-SCNC: 27 MMOL/L — SIGNIFICANT CHANGE UP (ref 22–31)
CREAT SERPL-MCNC: 2.28 MG/DL — HIGH (ref 0.5–1.3)
EGFR: 28 ML/MIN/1.73M2 — LOW
EGFR: 28 ML/MIN/1.73M2 — LOW
EOSINOPHIL # BLD AUTO: 0.25 K/UL — SIGNIFICANT CHANGE UP (ref 0–0.5)
EOSINOPHIL NFR BLD AUTO: 2.6 % — SIGNIFICANT CHANGE UP (ref 0–6)
GLUCOSE BLDC GLUCOMTR-MCNC: 114 MG/DL — HIGH (ref 70–99)
GLUCOSE BLDC GLUCOMTR-MCNC: 131 MG/DL — HIGH (ref 70–99)
GLUCOSE BLDC GLUCOMTR-MCNC: 159 MG/DL — HIGH (ref 70–99)
GLUCOSE BLDC GLUCOMTR-MCNC: 88 MG/DL — SIGNIFICANT CHANGE UP (ref 70–99)
GLUCOSE SERPL-MCNC: 91 MG/DL — SIGNIFICANT CHANGE UP (ref 70–99)
HCT VFR BLD CALC: 30.9 % — LOW (ref 39–50)
HGB BLD-MCNC: 8.6 G/DL — LOW (ref 13–17)
IMM GRANULOCYTES NFR BLD AUTO: 0.6 % — SIGNIFICANT CHANGE UP (ref 0–0.9)
LYMPHOCYTES # BLD AUTO: 0.83 K/UL — LOW (ref 1–3.3)
LYMPHOCYTES # BLD AUTO: 8.7 % — LOW (ref 13–44)
MAGNESIUM SERPL-MCNC: 2.7 MG/DL — HIGH (ref 1.6–2.6)
MCHC RBC-ENTMCNC: 24.9 PG — LOW (ref 27–34)
MCHC RBC-ENTMCNC: 27.8 G/DL — LOW (ref 32–36)
MCV RBC AUTO: 89.6 FL — SIGNIFICANT CHANGE UP (ref 80–100)
MONOCYTES # BLD AUTO: 1.3 K/UL — HIGH (ref 0–0.9)
MONOCYTES NFR BLD AUTO: 13.7 % — SIGNIFICANT CHANGE UP (ref 2–14)
NEUTROPHILS # BLD AUTO: 7.06 K/UL — SIGNIFICANT CHANGE UP (ref 1.8–7.4)
NEUTROPHILS NFR BLD AUTO: 74.2 % — SIGNIFICANT CHANGE UP (ref 43–77)
NRBC BLD AUTO-RTO: 0 /100 WBCS — SIGNIFICANT CHANGE UP (ref 0–0)
PLATELET # BLD AUTO: 189 K/UL — SIGNIFICANT CHANGE UP (ref 150–400)
POTASSIUM SERPL-MCNC: 4.3 MMOL/L — SIGNIFICANT CHANGE UP (ref 3.5–5.3)
POTASSIUM SERPL-SCNC: 4.3 MMOL/L — SIGNIFICANT CHANGE UP (ref 3.5–5.3)
PROT SERPL-MCNC: 7.1 G/DL — SIGNIFICANT CHANGE UP (ref 6–8.3)
RBC # BLD: 3.45 M/UL — LOW (ref 4.2–5.8)
RBC # FLD: 22.2 % — HIGH (ref 10.3–14.5)
SODIUM SERPL-SCNC: 135 MMOL/L — SIGNIFICANT CHANGE UP (ref 135–145)
WBC # BLD: 9.52 K/UL — SIGNIFICANT CHANGE UP (ref 3.8–10.5)
WBC # FLD AUTO: 9.52 K/UL — SIGNIFICANT CHANGE UP (ref 3.8–10.5)

## 2025-02-26 PROCEDURE — 99232 SBSQ HOSP IP/OBS MODERATE 35: CPT

## 2025-02-26 RX ORDER — SODIUM CHLORIDE 3 G/100ML
150 INJECTION, SOLUTION INTRAVENOUS ONCE
Refills: 0 | Status: COMPLETED | OUTPATIENT
Start: 2025-02-26 | End: 2025-02-26

## 2025-02-26 RX ORDER — INSULIN GLARGINE-YFGN 100 [IU]/ML
18 INJECTION, SOLUTION SUBCUTANEOUS AT BEDTIME
Refills: 0 | Status: DISCONTINUED | OUTPATIENT
Start: 2025-02-26 | End: 2025-04-14

## 2025-02-26 RX ADMIN — TAMSULOSIN HYDROCHLORIDE 0.4 MILLIGRAM(S): 0.4 CAPSULE ORAL at 22:19

## 2025-02-26 RX ADMIN — BUMETANIDE 2 MILLIGRAM(S): 1 TABLET ORAL at 06:42

## 2025-02-26 RX ADMIN — IPRATROPIUM BROMIDE AND ALBUTEROL SULFATE 3 MILLILITER(S): .5; 2.5 SOLUTION RESPIRATORY (INHALATION) at 02:02

## 2025-02-26 RX ADMIN — ISOSORBDIE DINITRATE 10 MILLIGRAM(S): 30 TABLET ORAL at 18:29

## 2025-02-26 RX ADMIN — Medication 25 MILLIGRAM(S): at 22:20

## 2025-02-26 RX ADMIN — INSULIN LISPRO 3 UNIT(S): 100 INJECTION, SOLUTION INTRAVENOUS; SUBCUTANEOUS at 13:33

## 2025-02-26 RX ADMIN — RIVAROXABAN 15 MILLIGRAM(S): 10 TABLET, FILM COATED ORAL at 18:30

## 2025-02-26 RX ADMIN — Medication 81 MILLIGRAM(S): at 13:38

## 2025-02-26 RX ADMIN — Medication 25 MILLIGRAM(S): at 06:42

## 2025-02-26 RX ADMIN — METOPROLOL SUCCINATE 50 MILLIGRAM(S): 50 TABLET, EXTENDED RELEASE ORAL at 06:09

## 2025-02-26 RX ADMIN — FOLIC ACID 1 MILLIGRAM(S): 1 TABLET ORAL at 12:11

## 2025-02-26 RX ADMIN — Medication 25 MILLIGRAM(S): at 13:38

## 2025-02-26 RX ADMIN — TIOTROPIUM BROMIDE INHALATION SPRAY 2 PUFF(S): 3.12 SPRAY, METERED RESPIRATORY (INHALATION) at 12:12

## 2025-02-26 RX ADMIN — Medication 1 SPRAY(S): at 06:09

## 2025-02-26 RX ADMIN — INSULIN GLARGINE-YFGN 18 UNIT(S): 100 INJECTION, SOLUTION SUBCUTANEOUS at 22:17

## 2025-02-26 RX ADMIN — INSULIN LISPRO 0: 100 INJECTION, SOLUTION INTRAVENOUS; SUBCUTANEOUS at 08:50

## 2025-02-26 RX ADMIN — ISOSORBDIE DINITRATE 10 MILLIGRAM(S): 30 TABLET ORAL at 06:10

## 2025-02-26 RX ADMIN — INSULIN LISPRO 3 UNIT(S): 100 INJECTION, SOLUTION INTRAVENOUS; SUBCUTANEOUS at 08:50

## 2025-02-26 RX ADMIN — Medication 1 DOSE(S): at 18:29

## 2025-02-26 RX ADMIN — IPRATROPIUM BROMIDE AND ALBUTEROL SULFATE 3 MILLILITER(S): .5; 2.5 SOLUTION RESPIRATORY (INHALATION) at 13:38

## 2025-02-26 RX ADMIN — Medication 1 DOSE(S): at 06:09

## 2025-02-26 RX ADMIN — Medication 1 SPRAY(S): at 18:29

## 2025-02-26 RX ADMIN — BUMETANIDE 2 MILLIGRAM(S): 1 TABLET ORAL at 13:38

## 2025-02-26 RX ADMIN — SODIUM CHLORIDE 300 MILLILITER(S): 3 INJECTION, SOLUTION INTRAVENOUS at 14:58

## 2025-02-26 RX ADMIN — INSULIN LISPRO 3 UNIT(S): 100 INJECTION, SOLUTION INTRAVENOUS; SUBCUTANEOUS at 18:20

## 2025-02-26 RX ADMIN — ATORVASTATIN CALCIUM 20 MILLIGRAM(S): 80 TABLET, FILM COATED ORAL at 22:19

## 2025-02-26 NOTE — PROGRESS NOTE ADULT - SUBJECTIVE AND OBJECTIVE BOX
[facility], [facility]  NEPHROLOGY AND HYPERTENSION  [address]  [address]  [place] [address]  [phone]    MD DANIEL GARRETT MD YELENA ROSENBERG, MD BINNY KOSHY, MD CHRISTOPHER CAPUTO, MD EDWARD BOVER, MD          Patient events noted  No distress  In chair   wife at bedside      MEDICATIONS  (STANDING):  aspirin enteric coated 81 milliGRAM(s) Oral daily  atorvastatin 20 milliGRAM(s) Oral at bedtime  buMETAnide Injectable 2 milliGRAM(s) IV Push two times a day  dextrose 5%. 1000 milliLiter(s) (50 mL/Hr) IV Continuous <Continuous>  dextrose 5%. 1000 milliLiter(s) (100 mL/Hr) IV Continuous <Continuous>  dextrose 50% Injectable 25 Gram(s) IV Push once  dextrose 50% Injectable 12.5 Gram(s) IV Push once  dextrose 50% Injectable 25 Gram(s) IV Push once  fluticasone propionate/ salmeterol 250-50 MICROgram(s) Diskus 1 Dose(s) Inhalation two times a day  folic acid 1 milliGRAM(s) Oral daily  glucagon  Injectable 1 milliGRAM(s) IntraMuscular once  hydrALAZINE 25 milliGRAM(s) Oral three times a day  insulin glargine Injectable (LANTUS) 18 Unit(s) SubCutaneous at bedtime  insulin lispro (ADMELOG) corrective regimen sliding scale   SubCutaneous three times a day before meals  insulin lispro Injectable (ADMELOG) 3 Unit(s) SubCutaneous three times a day before meals  isosorbide   dinitrate Tablet (ISORDIL) 10 milliGRAM(s) Oral three times a day  metoprolol succinate ER 50 milliGRAM(s) Oral daily  rivaroxaban 15 milliGRAM(s) Oral with dinner  sodium chloride 0.65% Nasal 1 Spray(s) Both Nostrils two times a day  sodium chloride 3% Bolus 150 milliLiter(s) IV Bolus once  tamsulosin 0.4 milliGRAM(s) Oral at bedtime  tiotropium 2.5 MICROgram(s) Inhaler 2 Puff(s) Inhalation daily    MEDICATIONS  (PRN):  acetaminophen     Tablet .. 650 milliGRAM(s) Oral every 6 hours PRN Temp greater or equal to 38C (100.4F), Mild Pain (1 - 3)  albuterol/ipratropium for Nebulization 3 milliLiter(s) Nebulizer every 6 hours PRN Shortness of Breath and/or Wheezing  dextrose Oral Gel 15 Gram(s) Oral once PRN Blood Glucose LESS THAN 70 milliGRAM(s)/deciliter  dextrose Oral Gel 15 Gram(s) Oral once PRN Blood Glucose LESS THAN 70 milliGRAM(s)/deciliter  melatonin 3 milliGRAM(s) Oral at bedtime PRN Insomnia      02-25-25 @ 07:01  -  02-26-25 @ 07:00  --------------------------------------------------------  IN: 470 mL / OUT: 1800 mL / NET: -1330 mL    02-26-25 @ 07:01  -  02-26-25 @ 12:02  --------------------------------------------------------  IN: 240 mL / OUT: 0 mL / NET: 240 mL      PHYSICAL EXAM:      T(C): 36.4 (02-26-25 @ 11:35), Max: 37.1 (02-25-25 @ 20:42)  HR: 71 (02-26-25 @ 11:35) (70 - 78)  BP: 97/62 (02-26-25 @ 11:35) (97/62 - 118/73)  RR: 18 (02-26-25 @ 11:35) (18 - 18)  SpO2: 99% (02-26-25 @ 11:35) (96% - 99%)  Wt(kg): --  Lungs clear decreased BS bases  Heart S1S2  Abd soft NT ND  Extremities:   1 edema                                    8.6    9.52  )-----------( 189      ( 26 Feb 2025 07:14 )             30.9     02-26    135  |  94[L]  |  95[H]  ----------------------------<  91  4.3   |  27  |  2.28[H]    Ca    8.6      26 Feb 2025 07:13  Phos  6.3     02-25  Mg     2.7     02-26    TPro  7.1  /  Alb  3.2[L]  /  TBili  0.6  /  DBili  x   /  AST  21  /  ALT  15  /  AlkPhos  244[H]  02-26      LIVER FUNCTIONS - ( 26 Feb 2025 07:13 )  Alb: 3.2 g/dL / Pro: 7.1 g/dL / ALK PHOS: 244 U/L / ALT: 15 U/L / AST: 21 U/L / GGT: x           Creatinine Trend: 2.28<--, 2.47<--, 3.12<--, 3.07<--, 3.02<--, 3.25<--          A/P:    CM, EF ~ 30%, severe TR  Adm to [facility] VS 1/10 w/fluid overload   Tx to [facility] 2/5 for further management   S/p aggressive diuresis w/improvement   Cardio-renal DENISHA/CKD 3 (baseline Cr ~ 2. - 2.5)  Mild hypervolemic hyponatremia   Diuresis per HF team  Cr is improving   Good UO  F/u BMP, Mg, UO  Avoid nephrotoxins, hypotension as possible   Prognosis is guarded overall  D/w family at bedside   Per pt and family wishes, no HD if/when became necessary     Jon Rowe MD

## 2025-02-26 NOTE — PROGRESS NOTE ADULT - SUBJECTIVE AND OBJECTIVE BOX
[facility] Division of Hospital Medicine  Hanane Clark MD  Available via MS Teams    SUBJECTIVE / OVERNIGHT EVENTS:  No acute events overnight. Some sob overnight, improved with breathing tx. No complaints this AM.    MEDICATIONS  (STANDING):  aspirin enteric coated 81 milliGRAM(s) Oral daily  atorvastatin 20 milliGRAM(s) Oral at bedtime  buMETAnide Injectable 2 milliGRAM(s) IV Push two times a day  dextrose 5%. 1000 milliLiter(s) (100 mL/Hr) IV Continuous <Continuous>  dextrose 5%. 1000 milliLiter(s) (50 mL/Hr) IV Continuous <Continuous>  dextrose 50% Injectable 25 Gram(s) IV Push once  dextrose 50% Injectable 12.5 Gram(s) IV Push once  dextrose 50% Injectable 25 Gram(s) IV Push once  fluticasone propionate/ salmeterol 250-50 MICROgram(s) Diskus 1 Dose(s) Inhalation two times a day  folic acid 1 milliGRAM(s) Oral daily  glucagon  Injectable 1 milliGRAM(s) IntraMuscular once  hydrALAZINE 25 milliGRAM(s) Oral three times a day  insulin glargine Injectable (LANTUS) 18 Unit(s) SubCutaneous at bedtime  insulin lispro (ADMELOG) corrective regimen sliding scale   SubCutaneous three times a day before meals  insulin lispro Injectable (ADMELOG) 3 Unit(s) SubCutaneous three times a day before meals  isosorbide   dinitrate Tablet (ISORDIL) 10 milliGRAM(s) Oral three times a day  metoprolol succinate ER 50 milliGRAM(s) Oral daily  rivaroxaban 15 milliGRAM(s) Oral with dinner  sodium chloride 0.65% Nasal 1 Spray(s) Both Nostrils two times a day  sodium chloride 3% Bolus 150 milliLiter(s) IV Bolus once  tamsulosin 0.4 milliGRAM(s) Oral at bedtime  tiotropium 2.5 MICROgram(s) Inhaler 2 Puff(s) Inhalation daily    MEDICATIONS  (PRN):  acetaminophen     Tablet .. 650 milliGRAM(s) Oral every 6 hours PRN Temp greater or equal to 38C (100.4F), Mild Pain (1 - 3)  albuterol/ipratropium for Nebulization 3 milliLiter(s) Nebulizer every 6 hours PRN Shortness of Breath and/or Wheezing  dextrose Oral Gel 15 Gram(s) Oral once PRN Blood Glucose LESS THAN 70 milliGRAM(s)/deciliter  dextrose Oral Gel 15 Gram(s) Oral once PRN Blood Glucose LESS THAN 70 milliGRAM(s)/deciliter  melatonin 3 milliGRAM(s) Oral at bedtime PRN Insomnia      I&O's Summary    25 Feb 2025 07:01  -  26 Feb 2025 07:00  --------------------------------------------------------  IN: 470 mL / OUT: 1800 mL / NET: -1330 mL    26 Feb 2025 07:01  -  26 Feb 2025 12:43  --------------------------------------------------------  IN: 240 mL / OUT: 0 mL / NET: 240 mL        PHYSICAL EXAM:  Vital Signs Last 24 Hrs  T(C): 36.4 (26 Feb 2025 11:35), Max: 37.1 (25 Feb 2025 20:42)  T(F): 97.6 (26 Feb 2025 11:35), Max: 98.8 (25 Feb 2025 20:42)  HR: 71 (26 Feb 2025 11:35) (70 - 78)  BP: 97/62 (26 Feb 2025 11:35) (97/62 - 118/73)  BP(mean): --  RR: 18 (26 Feb 2025 11:35) (18 - 18)  SpO2: 99% (26 Feb 2025 11:35) (96% - 99%)    Parameters below as of 26 Feb 2025 11:35  Patient On (Oxygen Delivery Method): nasal cannula  O2 Flow (L/min): 2    CONSTITUTIONAL: NAD  EYES: EOMI, conjunctiva and sclera clear  NECK: Supple  RESPIRATORY: Normal respiratory effort; lungs are clear to auscultation bilaterally  CARDIOVASCULAR: Regular rate and rhythm, no murmur, +BLE edema  ABDOMEN: Nontender to palpation, +distention  PSYCH: A+O to person, place, and time  NEUROLOGY: no FND    LABS:                        8.6    9.52  )-----------( 189      ( 26 Feb 2025 07:14 )             30.9     02-26    135  |  94[L]  |  95[H]  ----------------------------<  91  4.3   |  27  |  2.28[H]    Ca    8.6      26 Feb 2025 07:13  Phos  6.3     02-25  Mg     2.7     02-26    TPro  7.1  /  Alb  3.2[L]  /  TBili  0.6  /  DBili  x   /  AST  21  /  ALT  15  /  AlkPhos  244[H]  02-26          Urinalysis Basic - ( 26 Feb 2025 07:13 )    Color: x / Appearance: x / SG: x / pH: x  Gluc: 91 mg/dL / Ketone: x  / Bili: x / Urobili: x   Blood: x / Protein: x / Nitrite: x   Leuk Esterase: x / RBC: x / WBC x   Sq Epi: x / Non Sq Epi: x / Bacteria: x        SARS-CoV-2: NotDetec (10 Tay 2025 19:29)      RADIOLOGY & ADDITIONAL TESTS:  New Imaging Personally Reviewed Today:  New Electrocardiogram Personally Reviewed Today:  Other Results Reviewed Today:   Prior or Outpatient Records Reviewed Today with Summary:    COORDINATION OF CARE:  Consultant Communication and Details of Discussion (where applicable):

## 2025-02-26 NOTE — PROGRESS NOTE ADULT - ASSESSMENT
78 y/o male w/ PMHx CAD s/p CABG, HF (combined HFrEF [EF 25%] and HFpEF [G3DD]) w/ ICD, AFib on Xarelto, severe AS s/p TAVR, COPD on 3-4L home O2, CKD4, HTN, HLD, T2DM, and BPH who presents as a transfer from [facility] for HF evaluation. Ongoing CHF exacerbation now off bumex gtt, new DENISHA on CKD

## 2025-02-26 NOTE — PROGRESS NOTE ADULT - PROBLEM SELECTOR PLAN 1
EF 30% with severe tricuspid regurg and PASP 55mmHG on 2/7/24  - Diuretic: IV bumex 2 mg BID with HTS  - BB:  Metoprolol succinate 50mg day  - ARNI/ACE-I/ARB: hold entresto due to DENISHA  - Hydralazine/Nitrate: hydral 25mg TID. Isordil 10mg TID   - MRA: aldactone  - SGLT2: none   dry wt is 243 lbs  - daily standing weights

## 2025-02-27 DIAGNOSIS — R04.0 EPISTAXIS: ICD-10-CM

## 2025-02-27 LAB
ALBUMIN SERPL ELPH-MCNC: 3.6 G/DL — SIGNIFICANT CHANGE UP (ref 3.3–5)
ALP SERPL-CCNC: 251 U/L — HIGH (ref 40–120)
ALT FLD-CCNC: 12 U/L — SIGNIFICANT CHANGE UP (ref 10–45)
ANION GAP SERPL CALC-SCNC: 13 MMOL/L — SIGNIFICANT CHANGE UP (ref 5–17)
AST SERPL-CCNC: 22 U/L — SIGNIFICANT CHANGE UP (ref 10–40)
BASOPHILS # BLD AUTO: 0.03 K/UL — SIGNIFICANT CHANGE UP (ref 0–0.2)
BASOPHILS NFR BLD AUTO: 0.3 % — SIGNIFICANT CHANGE UP (ref 0–2)
BILIRUB SERPL-MCNC: 0.6 MG/DL — SIGNIFICANT CHANGE UP (ref 0.2–1.2)
BUN SERPL-MCNC: 91 MG/DL — HIGH (ref 7–23)
CALCIUM SERPL-MCNC: 9 MG/DL — SIGNIFICANT CHANGE UP (ref 8.4–10.5)
CHLORIDE SERPL-SCNC: 94 MMOL/L — LOW (ref 96–108)
CO2 SERPL-SCNC: 28 MMOL/L — SIGNIFICANT CHANGE UP (ref 22–31)
CREAT SERPL-MCNC: 2.26 MG/DL — HIGH (ref 0.5–1.3)
EGFR: 28 ML/MIN/1.73M2 — LOW
EGFR: 28 ML/MIN/1.73M2 — LOW
EOSINOPHIL # BLD AUTO: 0.22 K/UL — SIGNIFICANT CHANGE UP (ref 0–0.5)
EOSINOPHIL NFR BLD AUTO: 2 % — SIGNIFICANT CHANGE UP (ref 0–6)
GLUCOSE BLDC GLUCOMTR-MCNC: 116 MG/DL — HIGH (ref 70–99)
GLUCOSE BLDC GLUCOMTR-MCNC: 121 MG/DL — HIGH (ref 70–99)
GLUCOSE BLDC GLUCOMTR-MCNC: 142 MG/DL — HIGH (ref 70–99)
GLUCOSE BLDC GLUCOMTR-MCNC: 152 MG/DL — HIGH (ref 70–99)
GLUCOSE SERPL-MCNC: 140 MG/DL — HIGH (ref 70–99)
HCT VFR BLD CALC: 31.7 % — LOW (ref 39–50)
HGB BLD-MCNC: 8.9 G/DL — LOW (ref 13–17)
IMM GRANULOCYTES NFR BLD AUTO: 0.6 % — SIGNIFICANT CHANGE UP (ref 0–0.9)
LYMPHOCYTES # BLD AUTO: 1.13 K/UL — SIGNIFICANT CHANGE UP (ref 1–3.3)
LYMPHOCYTES # BLD AUTO: 10.4 % — LOW (ref 13–44)
MAGNESIUM SERPL-MCNC: 2.6 MG/DL — SIGNIFICANT CHANGE UP (ref 1.6–2.6)
MCHC RBC-ENTMCNC: 25.1 PG — LOW (ref 27–34)
MCHC RBC-ENTMCNC: 28.1 G/DL — LOW (ref 32–36)
MCV RBC AUTO: 89.5 FL — SIGNIFICANT CHANGE UP (ref 80–100)
MONOCYTES # BLD AUTO: 1.38 K/UL — HIGH (ref 0–0.9)
MONOCYTES NFR BLD AUTO: 12.7 % — SIGNIFICANT CHANGE UP (ref 2–14)
NEUTROPHILS # BLD AUTO: 8.05 K/UL — HIGH (ref 1.8–7.4)
NEUTROPHILS NFR BLD AUTO: 74 % — SIGNIFICANT CHANGE UP (ref 43–77)
NRBC BLD AUTO-RTO: 0 /100 WBCS — SIGNIFICANT CHANGE UP (ref 0–0)
PLATELET # BLD AUTO: 198 K/UL — SIGNIFICANT CHANGE UP (ref 150–400)
POTASSIUM SERPL-MCNC: 4.3 MMOL/L — SIGNIFICANT CHANGE UP (ref 3.5–5.3)
POTASSIUM SERPL-SCNC: 4.3 MMOL/L — SIGNIFICANT CHANGE UP (ref 3.5–5.3)
PROT SERPL-MCNC: 7.2 G/DL — SIGNIFICANT CHANGE UP (ref 6–8.3)
RBC # BLD: 3.54 M/UL — LOW (ref 4.2–5.8)
RBC # FLD: 21.9 % — HIGH (ref 10.3–14.5)
SODIUM SERPL-SCNC: 135 MMOL/L — SIGNIFICANT CHANGE UP (ref 135–145)
WBC # BLD: 10.87 K/UL — HIGH (ref 3.8–10.5)
WBC # FLD AUTO: 10.87 K/UL — HIGH (ref 3.8–10.5)

## 2025-02-27 PROCEDURE — 99233 SBSQ HOSP IP/OBS HIGH 50: CPT

## 2025-02-27 PROCEDURE — 99232 SBSQ HOSP IP/OBS MODERATE 35: CPT

## 2025-02-27 RX ORDER — CHLOROTHIAZIDE 250 MG/1
500 TABLET ORAL ONCE
Refills: 0 | Status: COMPLETED | OUTPATIENT
Start: 2025-02-27 | End: 2025-02-27

## 2025-02-27 RX ORDER — ISOSORBDIE DINITRATE 30 MG/1
20 TABLET ORAL THREE TIMES A DAY
Refills: 0 | Status: DISCONTINUED | OUTPATIENT
Start: 2025-02-27 | End: 2025-03-11

## 2025-02-27 RX ORDER — SODIUM CHLORIDE 3 G/100ML
150 INJECTION, SOLUTION INTRAVENOUS ONCE
Refills: 0 | Status: COMPLETED | OUTPATIENT
Start: 2025-02-27 | End: 2025-02-27

## 2025-02-27 RX ADMIN — ATORVASTATIN CALCIUM 20 MILLIGRAM(S): 80 TABLET, FILM COATED ORAL at 21:38

## 2025-02-27 RX ADMIN — CHLOROTHIAZIDE 100 MILLIGRAM(S): 250 TABLET ORAL at 12:57

## 2025-02-27 RX ADMIN — INSULIN LISPRO 3 UNIT(S): 100 INJECTION, SOLUTION INTRAVENOUS; SUBCUTANEOUS at 18:12

## 2025-02-27 RX ADMIN — TAMSULOSIN HYDROCHLORIDE 0.4 MILLIGRAM(S): 0.4 CAPSULE ORAL at 21:38

## 2025-02-27 RX ADMIN — Medication 1 DOSE(S): at 18:08

## 2025-02-27 RX ADMIN — ISOSORBDIE DINITRATE 10 MILLIGRAM(S): 30 TABLET ORAL at 05:57

## 2025-02-27 RX ADMIN — TIOTROPIUM BROMIDE INHALATION SPRAY 2 PUFF(S): 3.12 SPRAY, METERED RESPIRATORY (INHALATION) at 13:01

## 2025-02-27 RX ADMIN — Medication 1 SPRAY(S): at 18:08

## 2025-02-27 RX ADMIN — INSULIN LISPRO 3 UNIT(S): 100 INJECTION, SOLUTION INTRAVENOUS; SUBCUTANEOUS at 13:01

## 2025-02-27 RX ADMIN — INSULIN GLARGINE-YFGN 18 UNIT(S): 100 INJECTION, SOLUTION SUBCUTANEOUS at 21:37

## 2025-02-27 RX ADMIN — RIVAROXABAN 15 MILLIGRAM(S): 10 TABLET, FILM COATED ORAL at 18:09

## 2025-02-27 RX ADMIN — Medication 25 MILLIGRAM(S): at 05:58

## 2025-02-27 RX ADMIN — BUMETANIDE 2 MILLIGRAM(S): 1 TABLET ORAL at 13:02

## 2025-02-27 RX ADMIN — Medication 50 MILLIGRAM(S): at 21:37

## 2025-02-27 RX ADMIN — Medication 1 SPRAY(S): at 05:57

## 2025-02-27 RX ADMIN — Medication 2 SPRAY(S): at 18:11

## 2025-02-27 RX ADMIN — FOLIC ACID 1 MILLIGRAM(S): 1 TABLET ORAL at 13:00

## 2025-02-27 RX ADMIN — METOPROLOL SUCCINATE 50 MILLIGRAM(S): 50 TABLET, EXTENDED RELEASE ORAL at 05:57

## 2025-02-27 RX ADMIN — Medication 50 MILLIGRAM(S): at 13:07

## 2025-02-27 RX ADMIN — IPRATROPIUM BROMIDE AND ALBUTEROL SULFATE 3 MILLILITER(S): .5; 2.5 SOLUTION RESPIRATORY (INHALATION) at 13:00

## 2025-02-27 RX ADMIN — INSULIN LISPRO 3 UNIT(S): 100 INJECTION, SOLUTION INTRAVENOUS; SUBCUTANEOUS at 09:12

## 2025-02-27 RX ADMIN — ISOSORBDIE DINITRATE 20 MILLIGRAM(S): 30 TABLET ORAL at 18:08

## 2025-02-27 RX ADMIN — SODIUM CHLORIDE 300 MILLILITER(S): 3 INJECTION, SOLUTION INTRAVENOUS at 12:56

## 2025-02-27 RX ADMIN — Medication 1 DOSE(S): at 05:57

## 2025-02-27 RX ADMIN — BUMETANIDE 2 MILLIGRAM(S): 1 TABLET ORAL at 05:58

## 2025-02-27 NOTE — PROGRESS NOTE ADULT - SUBJECTIVE AND OBJECTIVE BOX
No distress, on NC O2    Vital Signs Last 24 Hrs  T(C): 36.4 (02-27-25 @ 05:50), Max: 36.7 (02-26-25 @ 20:46)  T(F): 97.6 (02-27-25 @ 05:50), Max: 98.1 (02-26-25 @ 20:46)  HR: 69 (02-27-25 @ 08:30) (69 - 76)  BP: 124/74 (02-27-25 @ 08:30) (97/62 - 124/74)  RR: 18 (02-27-25 @ 05:50) (18 - 18)  SpO2: 98% (02-27-25 @ 05:50) (97% - 99%)    I&O's Detail    26 Feb 2025 07:01  -  27 Feb 2025 07:00  --------------------------------------------------------  IN:    Oral Fluid: 924 mL  Total IN: 924 mL    OUT:    Voided (mL): 975 mL  Total OUT: 975 mL    s1s2  b/l air entry  soft, ND  sm edema                                               8.9    10.87 )-----------( 198      ( 27 Feb 2025 06:42 )             31.7     27 Feb 2025 06:42    135    |  94     |  91     ----------------------------<  140    4.3     |  28     |  2.26     Ca    9.0        27 Feb 2025 06:42  Mg     2.6       27 Feb 2025 06:42    TPro  7.2    /  Alb  3.6    /  TBili  0.6    /  DBili  x      /  AST  22     /  ALT  12     /  AlkPhos  251    27 Feb 2025 06:42    LIVER FUNCTIONS - ( 27 Feb 2025 06:42 )  Alb: 3.6 g/dL / Pro: 7.2 g/dL / ALK PHOS: 251 U/L / ALT: 12 U/L / AST: 22 U/L / GGT: x           acetaminophen     Tablet .. 650 milliGRAM(s) Oral every 6 hours PRN  albuterol/ipratropium for Nebulization 3 milliLiter(s) Nebulizer every 6 hours PRN  aspirin enteric coated 81 milliGRAM(s) Oral daily  atorvastatin 20 milliGRAM(s) Oral at bedtime  buMETAnide Injectable 2 milliGRAM(s) IV Push two times a day  chlorothiazide IVPB 500 milliGRAM(s) IV Intermittent once  dextrose 5%. 1000 milliLiter(s) IV Continuous <Continuous>  dextrose 5%. 1000 milliLiter(s) IV Continuous <Continuous>  dextrose 50% Injectable 25 Gram(s) IV Push once  dextrose 50% Injectable 12.5 Gram(s) IV Push once  dextrose 50% Injectable 25 Gram(s) IV Push once  dextrose Oral Gel 15 Gram(s) Oral once PRN  dextrose Oral Gel 15 Gram(s) Oral once PRN  fluticasone propionate/ salmeterol 250-50 MICROgram(s) Diskus 1 Dose(s) Inhalation two times a day  folic acid 1 milliGRAM(s) Oral daily  glucagon  Injectable 1 milliGRAM(s) IntraMuscular once  hydrALAZINE 25 milliGRAM(s) Oral three times a day  insulin glargine Injectable (LANTUS) 18 Unit(s) SubCutaneous at bedtime  insulin lispro (ADMELOG) corrective regimen sliding scale   SubCutaneous three times a day before meals  insulin lispro Injectable (ADMELOG) 3 Unit(s) SubCutaneous three times a day before meals  isosorbide   dinitrate Tablet (ISORDIL) 10 milliGRAM(s) Oral three times a day  melatonin 3 milliGRAM(s) Oral at bedtime PRN  metoprolol succinate ER 50 milliGRAM(s) Oral daily  oxymetazoline 0.05% Nasal Spray 2 Spray(s) Right Nostril every 12 hours  rivaroxaban 15 milliGRAM(s) Oral with dinner  sodium chloride 0.65% Nasal 1 Spray(s) Both Nostrils two times a day  sodium chloride 3% Bolus 150 milliLiter(s) IV Bolus once  tamsulosin 0.4 milliGRAM(s) Oral at bedtime  tiotropium 2.5 MICROgram(s) Inhaler 2 Puff(s) Inhalation daily    A/P:    CM, EF ~ 30%, severe TR  Adm to [facility] VS 1/10 w/fluid overload   Tx to [facility] 2/5 for further management   S/p aggressive diuresis w/improvement   Known CKD 3 (baseline Cr ~ 2. - 2.5)  Diuresis per HF team  Stable renal fx at present  Good UO  F/u BMP, Mg, UO  Avoid nephrotoxins, hypotension as possible     [phone]

## 2025-02-27 NOTE — PROGRESS NOTE ADULT - SUBJECTIVE AND OBJECTIVE BOX
Patient seen and examined at bedside.    Overnight Events: epistaxis    REVIEW OF SYSTEMS:  CONSTITUTIONAL: No weakness, fevers or chills  EYES/ENT: No visual changes;  No dysphagia  NECK: No pain or stiffness  RESPIRATORY: No cough, wheezing, hemoptysis; No shortness of breath  CARDIOVASCULAR: No chest pain or palpitations; No lower extremity edema  GASTROINTESTINAL: No abdominal or epigastric pain. No nausea, vomiting, or hematemesis; No diarrhea or constipation. No melena or hematochezia.  BACK: No back pain  GENITOURINARY: No dysuria, frequency or hematuria  NEUROLOGICAL: No numbness or weakness  SKIN: No itching, burning, rashes, or lesions   All other review of systems is negative unless indicated above.            Current Meds:  acetaminophen     Tablet .. 650 milliGRAM(s) Oral every 6 hours PRN  albuterol/ipratropium for Nebulization 3 milliLiter(s) Nebulizer every 6 hours PRN  aspirin enteric coated 81 milliGRAM(s) Oral daily  atorvastatin 20 milliGRAM(s) Oral at bedtime  buMETAnide Injectable 2 milliGRAM(s) IV Push two times a day  dextrose 5%. 1000 milliLiter(s) IV Continuous <Continuous>  dextrose 5%. 1000 milliLiter(s) IV Continuous <Continuous>  dextrose 50% Injectable 25 Gram(s) IV Push once  dextrose 50% Injectable 12.5 Gram(s) IV Push once  dextrose 50% Injectable 25 Gram(s) IV Push once  dextrose Oral Gel 15 Gram(s) Oral once PRN  dextrose Oral Gel 15 Gram(s) Oral once PRN  fluticasone propionate/ salmeterol 250-50 MICROgram(s) Diskus 1 Dose(s) Inhalation two times a day  folic acid 1 milliGRAM(s) Oral daily  glucagon  Injectable 1 milliGRAM(s) IntraMuscular once  hydrALAZINE 25 milliGRAM(s) Oral three times a day  insulin glargine Injectable (LANTUS) 18 Unit(s) SubCutaneous at bedtime  insulin lispro (ADMELOG) corrective regimen sliding scale   SubCutaneous three times a day before meals  insulin lispro Injectable (ADMELOG) 3 Unit(s) SubCutaneous three times a day before meals  isosorbide   dinitrate Tablet (ISORDIL) 10 milliGRAM(s) Oral three times a day  melatonin 3 milliGRAM(s) Oral at bedtime PRN  metoprolol succinate ER 50 milliGRAM(s) Oral daily  oxymetazoline 0.05% Nasal Spray 2 Spray(s) Right Nostril every 12 hours  rivaroxaban 15 milliGRAM(s) Oral with dinner  sodium chloride 0.65% Nasal 1 Spray(s) Both Nostrils two times a day  sodium chloride 3% Bolus 150 milliLiter(s) IV Bolus once  tamsulosin 0.4 milliGRAM(s) Oral at bedtime  tiotropium 2.5 MICROgram(s) Inhaler 2 Puff(s) Inhalation daily      Vitals:  T(F): 97.6 (02-27), Max: 98.1 (02-26)  HR: 69 (02-27) (69 - 76)  BP: 124/74 (02-27) (97/62 - 124/74)  RR: 18 (02-27)  SpO2: 98% (02-27)  I&O's Summary    26 Feb 2025 07:01  -  27 Feb 2025 07:00  --------------------------------------------------------  IN: 924 mL / OUT: 975 mL / NET: -51 mL        Physical Exam:  GEN: NAD  HEENT: EOMI, clear sclera  PULM: CTA b/l, no wheeze  CV: RRR S1 S2, no murmur, +JVD  ABD: S, NT, ND  EXT: WWP, wrapped  PSYCH: normal affect  SKIN: No rash                          8.9    10.87 )-----------( 198      ( 27 Feb 2025 06:42 )             31.7     02-27    135  |  94[L]  |  91[H]  ----------------------------<  140[H]  4.3   |  28  |  2.26[H]    Ca    9.0      27 Feb 2025 06:42  Mg     2.6     02-27    TPro  7.2  /  Alb  3.6  /  TBili  0.6  /  DBili  x   /  AST  22  /  ALT  12  /  AlkPhos  251[H]  02-27

## 2025-02-27 NOTE — PROGRESS NOTE ADULT - ATTENDING COMMENTS
79-year-old male past medical history ischemic cardiomyopathy status post CABG, HFrEF, COPD, hypertension, hyperlipidemia, diabetes, CKD presenting with ADHF.  Course has been complicated by fluctuating kidney function in the setting of diuresis.    Overall prognosis is poor given multiple co-morbidities and age. He was offered RHC/CardioMEMs but declined.  He is now DNR/DNI.     On exam he is warm and well-perfused.  He is still moderately hypervolemic with JVP ~12 cm and peripheral edema but responding to IV diuresis. UOP seems to have slowed down. Recommend continuing IV Bumex 2 mg twice daily, hypertonic saline, and adding IV diuril 500 mg x1. GDMT is limited due to CKD.  Recommend increasing hydralazine 50 mg 3 times daily and Isordil 20 mg 3 times daily.  Continue metoprolol XL 50 mg daily.    Loc Pride MD  Interventional Cardiology/Advanced Heart Failure Transplant

## 2025-02-27 NOTE — PROGRESS NOTE ADULT - PROBLEM SELECTOR PLAN 1
: Etiology likely ischemic. Primary cardiologist Dr. Jewel Stubbs ([facility]), last TTE LVEF 25-30%. Appears has been on some GDMT as outpt (?Farxiga, Coreg and previously on ARNI though unclear why stopped)  - Toprol XL 50mg QD  - continue hydral 25, continue isordil 10  - hold entresto and dino for DENISHA  - continue bumex 2mg IV BID  - on lasix 40 BID PO at home  - Has ICD in place; Currently a DNR/DNI: trial NIV. Discussed with palliative and at this time wants ICD on as per wife  - Aggressive PT  - standing weights documented does not appear accurate  - Strict I/O's  - Target electrolyte repletion to target K+ >4.0 and Mg >2.0.  - close HF follow up  - likely not a good candidate for MEMs

## 2025-02-27 NOTE — PROGRESS NOTE ADULT - PROBLEM SELECTOR PLAN 1
EF 30% with severe tricuspid regurg and PASP 55mmHG on 2/7/24  - Diuretic: IV bumex 2 mg BID with HTS and diuril x1  - BB:  Metoprolol succinate 50mg day  - ARNI/ACE-I/ARB: hold entresto due to DENISHA  - Hydralazine/Nitrate: hydral 50mg TID. Isordil 20mg TID   - MRA: aldactone  - SGLT2: none   dry wt is 243 lbs  - daily standing weights

## 2025-02-27 NOTE — CHART NOTE - NSCHARTNOTEFT_GEN_A_CORE
MEDICINE JO MARTINO  79y Male    Patient is a 79y old  Male who presents with a chief complaint of HF eval (26 Feb 2025 12:43)     Notified by RN, Patient with hemoptysis overnight. Patient seen at bedside, in NAD, A&Ox3. Patient state that he has been having on and off coughing up of blood for the past few months. Patient states that this occurs because he "digs in his nose" which then causes his nose to bleed. Patient also states that since having his tonsils removed, he intermittently has bleeding at that site as well. Patient denies chest pain, SOB at rest, palpitations, dizziness. B/l nares with dry blood, unable to visualize oropharynx.     -Vital Signs Last 24 Hrs  T(C): 36.4 (27 Feb 2025 05:50), Max: 36.7 (26 Feb 2025 20:46)  T(F): 97.6 (27 Feb 2025 05:50), Max: 98.1 (26 Feb 2025 20:46)  HR: 72 (27 Feb 2025 05:50) (70 - 76)  BP: 116/69 (27 Feb 2025 05:50) (97/62 - 120/64)  BP(mean): --  RR: 18 (27 Feb 2025 05:50) (18 - 18)  SpO2: 98% (27 Feb 2025 05:50) (97% - 99%)    Parameters below as of 27 Feb 2025 05:50  Patient On (Oxygen Delivery Method): nasal cannula  O2 Flow (L/min): 2    > Patient hemodynamically stable  > CBC this AM with stable hemoglobin  > Continue to collect sputum at bedside to quantify   > Consider ENT consult in AM  > Continue to monitor  > Signed out to day ACP; attending to follow       Dipesh Webb PA-C  Medicine Department  MS Teams

## 2025-02-27 NOTE — PROGRESS NOTE ADULT - PROBLEM SELECTOR PLAN 1
: Etiology likely ischemic. Primary cardiologist Dr. Jewel Stubbs ([facility]), last TTE LVEF 25-30%. Appears has been on some GDMT as outpt (?Farxiga, Coreg and previously on ARNI though unclear why stopped)  - Toprol XL 50mg QD  - increase hydral to 50, continue isordil 10  - hold entresto and dino for DENISHA  - continue bumex 2mg IV BID, add augmentation with 3% NS 150cc x1 and diuril 500mg IV x1  - on lasix 40 BID PO at home  - Has ICD in place; Currently a DNR/DNI: trial NIV. Discussed with palliative and at this time wants ICD on as per wife  - Aggressive PT  - standing weights documented does not appear accurate  - Strict I/O's  - Target electrolyte repletion to target K+ >4.0 and Mg >2.0.  - close HF follow up  - likely not a good candidate for MEMs : Etiology likely ischemic. Primary cardiologist Dr. Jewel Stubbs ([facility]), last TTE LVEF 25-30%. Appears has been on some GDMT as outpt (?Farxiga, Coreg and previously on ARNI though unclear why stopped)  - Toprol XL 50mg QD  - increase hydral to 50, isordil to 20  - hold entresto and dino for DENISHA  - continue bumex 2mg IV BID, add augmentation with 3% NS 150cc x1 and diuril 500mg IV x1  - on lasix 40 BID PO at home  - Has ICD in place; Currently a DNR/DNI: trial NIV. Discussed with palliative and at this time wants ICD on as per wife  - Aggressive PT  - standing weights documented does not appear accurate  - Strict I/O's  - Target electrolyte repletion to target K+ >4.0 and Mg >2.0.  - close HF follow up  - likely not a good candidate for MEMs

## 2025-02-27 NOTE — PROGRESS NOTE ADULT - SUBJECTIVE AND OBJECTIVE BOX
Patient seen and examined at bedside.    Overnight Events: net even    REVIEW OF SYSTEMS:  CONSTITUTIONAL: No weakness, fevers or chills  EYES/ENT: No visual changes;  No dysphagia  NECK: No pain or stiffness  RESPIRATORY: No cough, wheezing, hemoptysis; No shortness of breath  CARDIOVASCULAR: No chest pain or palpitations; No lower extremity edema  GASTROINTESTINAL: No abdominal or epigastric pain. No nausea, vomiting, or hematemesis; No diarrhea or constipation. No melena or hematochezia.  BACK: No back pain  GENITOURINARY: No dysuria, frequency or hematuria  NEUROLOGICAL: No numbness or weakness  SKIN: No itching, burning, rashes, or lesions   All other review of systems is negative unless indicated above.            Current Meds:  acetaminophen     Tablet .. 650 milliGRAM(s) Oral every 6 hours PRN  albuterol/ipratropium for Nebulization 3 milliLiter(s) Nebulizer every 6 hours PRN  aspirin enteric coated 81 milliGRAM(s) Oral daily  atorvastatin 20 milliGRAM(s) Oral at bedtime  buMETAnide Injectable 2 milliGRAM(s) IV Push two times a day  dextrose 5%. 1000 milliLiter(s) IV Continuous <Continuous>  dextrose 5%. 1000 milliLiter(s) IV Continuous <Continuous>  dextrose 50% Injectable 25 Gram(s) IV Push once  dextrose 50% Injectable 12.5 Gram(s) IV Push once  dextrose 50% Injectable 25 Gram(s) IV Push once  dextrose Oral Gel 15 Gram(s) Oral once PRN  dextrose Oral Gel 15 Gram(s) Oral once PRN  fluticasone propionate/ salmeterol 250-50 MICROgram(s) Diskus 1 Dose(s) Inhalation two times a day  folic acid 1 milliGRAM(s) Oral daily  glucagon  Injectable 1 milliGRAM(s) IntraMuscular once  hydrALAZINE 25 milliGRAM(s) Oral three times a day  insulin glargine Injectable (LANTUS) 18 Unit(s) SubCutaneous at bedtime  insulin lispro (ADMELOG) corrective regimen sliding scale   SubCutaneous three times a day before meals  insulin lispro Injectable (ADMELOG) 3 Unit(s) SubCutaneous three times a day before meals  isosorbide   dinitrate Tablet (ISORDIL) 10 milliGRAM(s) Oral three times a day  melatonin 3 milliGRAM(s) Oral at bedtime PRN  metoprolol succinate ER 50 milliGRAM(s) Oral daily  rivaroxaban 15 milliGRAM(s) Oral with dinner  sodium chloride 0.65% Nasal 1 Spray(s) Both Nostrils two times a day  tamsulosin 0.4 milliGRAM(s) Oral at bedtime  tiotropium 2.5 MICROgram(s) Inhaler 2 Puff(s) Inhalation daily      Vitals:  T(F): 97.6 (02-27), Max: 98.1 (02-26)  HR: 72 (02-27) (70 - 76)  BP: 116/69 (02-27) (97/62 - 120/64)  RR: 18 (02-27)  SpO2: 98% (02-27)  I&O's Summary    26 Feb 2025 07:01  -  27 Feb 2025 07:00  --------------------------------------------------------  IN: 924 mL / OUT: 975 mL / NET: -51 mL        Physical Exam:  GEN: NAD  HEENT: EOMI, clear sclera  PULM: on NC  CV: RRR S1 S2  ABD: S, NT, ND  EXT: wrapped  PSYCH: normal affect  SKIN: No rash                          8.9    10.87 )-----------( 198      ( 27 Feb 2025 06:42 )             31.7     02-27    135  |  94[L]  |  91[H]  ----------------------------<  140[H]  4.3   |  28  |  2.26[H]    Ca    9.0      27 Feb 2025 06:42  Phos  6.3     02-25  Mg     2.6     02-27    TPro  7.2  /  Alb  3.6  /  TBili  0.6  /  DBili  x   /  AST  22  /  ALT  12  /  AlkPhos  251[H]  02-27

## 2025-02-27 NOTE — PROGRESS NOTE ADULT - ASSESSMENT
78 y/o male w/ PMHx CAD s/p CABG, HFrEF (LVIDd 5.2, LVEF 25-30%) w/ DC- ICD, AFib (Xarelto), severe AS s/p TAVR, COPD on 3-4L home O2, CKD3, HTN, HLD, T2DM, and BPH presented to [facility] for ADHF, despite escalation of home diuretics. Now transferred to [facility] for refractory volume overload. His labs notable for stable Scr (though unclear baseline).  Repeat TTE shows severe BiV dysfunction. s/p RRT for hypotension given 500cc bolus on 2/11    GOC clarified and he remains DNR/DNI per wife - wants the ICD on. He was being diuresed well with bumex drip augmented with diamox.     Cardiac Studies  TTE 2/7/25 LVIDd 5.3cm, LVEF 30%, no LV thrombus, TAPSE 1.0cm, severely enlarged RV size/RVSF reduced, mod dilated LA, severe dilated RA, TAVR present, mod MR, severe TR, PASP 52, PASP 52, no pericardial effusion, IVC dilated 2.35cm  TTE 8/8/24; LVIDd 5.2, EF 25-30%, global hypokinesis, severe grade III DD, TASPE 1.1, TAVR present in AV position, mld-mod MR, mod TR, PASP 53

## 2025-02-27 NOTE — PROGRESS NOTE ADULT - ASSESSMENT
80 y/o male w/ PMHx CAD s/p CABG, HF (combined HFrEF [EF 25%] and HFpEF [G3DD]) w/ ICD, AFib on Xarelto, severe AS s/p TAVR, COPD on 3-4L home O2, CKD4, HTN, HLD, T2DM, and BPH who presents as a transfer from [facility] for HF evaluation. Ongoing CHF exacerbation now off bumex gtt, new DENISHA on CKD

## 2025-02-27 NOTE — PROGRESS NOTE ADULT - SUBJECTIVE AND OBJECTIVE BOX
[facility] Division of Hospital Medicine  Hanane Clark MD  Available via MS Teams    SUBJECTIVE / OVERNIGHT EVENTS:  Epistaxis this AM. Otherwise without complaints.    MEDICATIONS  (STANDING):  atorvastatin 20 milliGRAM(s) Oral at bedtime  buMETAnide Injectable 2 milliGRAM(s) IV Push two times a day  chlorothiazide IVPB 500 milliGRAM(s) IV Intermittent once  dextrose 5%. 1000 milliLiter(s) (50 mL/Hr) IV Continuous <Continuous>  dextrose 5%. 1000 milliLiter(s) (100 mL/Hr) IV Continuous <Continuous>  dextrose 50% Injectable 25 Gram(s) IV Push once  dextrose 50% Injectable 12.5 Gram(s) IV Push once  dextrose 50% Injectable 25 Gram(s) IV Push once  fluticasone propionate/ salmeterol 250-50 MICROgram(s) Diskus 1 Dose(s) Inhalation two times a day  folic acid 1 milliGRAM(s) Oral daily  glucagon  Injectable 1 milliGRAM(s) IntraMuscular once  hydrALAZINE 50 milliGRAM(s) Oral three times a day  insulin glargine Injectable (LANTUS) 18 Unit(s) SubCutaneous at bedtime  insulin lispro (ADMELOG) corrective regimen sliding scale   SubCutaneous three times a day before meals  insulin lispro Injectable (ADMELOG) 3 Unit(s) SubCutaneous three times a day before meals  isosorbide   dinitrate Tablet (ISORDIL) 20 milliGRAM(s) Oral three times a day  metoprolol succinate ER 50 milliGRAM(s) Oral daily  oxymetazoline 0.05% Nasal Spray 2 Spray(s) Right Nostril every 12 hours  rivaroxaban 15 milliGRAM(s) Oral with dinner  sodium chloride 0.65% Nasal 1 Spray(s) Both Nostrils two times a day  sodium chloride 3% Bolus 150 milliLiter(s) IV Bolus once  tamsulosin 0.4 milliGRAM(s) Oral at bedtime  tiotropium 2.5 MICROgram(s) Inhaler 2 Puff(s) Inhalation daily    MEDICATIONS  (PRN):  acetaminophen     Tablet .. 650 milliGRAM(s) Oral every 6 hours PRN Temp greater or equal to 38C (100.4F), Mild Pain (1 - 3)  albuterol/ipratropium for Nebulization 3 milliLiter(s) Nebulizer every 6 hours PRN Shortness of Breath and/or Wheezing  dextrose Oral Gel 15 Gram(s) Oral once PRN Blood Glucose LESS THAN 70 milliGRAM(s)/deciliter  dextrose Oral Gel 15 Gram(s) Oral once PRN Blood Glucose LESS THAN 70 milliGRAM(s)/deciliter  melatonin 3 milliGRAM(s) Oral at bedtime PRN Insomnia      I&O's Summary    26 Feb 2025 07:01  -  27 Feb 2025 07:00  --------------------------------------------------------  IN: 924 mL / OUT: 975 mL / NET: -51 mL        PHYSICAL EXAM:  Vital Signs Last 24 Hrs  T(C): 36.4 (27 Feb 2025 11:35), Max: 36.7 (26 Feb 2025 20:46)  T(F): 97.5 (27 Feb 2025 11:35), Max: 98.1 (26 Feb 2025 20:46)  HR: 70 (27 Feb 2025 11:35) (69 - 76)  BP: 105/61 (27 Feb 2025 11:35) (105/61 - 124/74)  BP(mean): --  RR: 18 (27 Feb 2025 11:35) (18 - 18)  SpO2: 97% (27 Feb 2025 11:35) (97% - 98%)    Parameters below as of 27 Feb 2025 11:35  Patient On (Oxygen Delivery Method): room air      CONSTITUTIONAL: NAD  EYES: EOMI, conjunctiva and sclera clear  NOSE: R nares with packing.  NECK: Supple  RESPIRATORY: Normal respiratory effort; lungs are clear to auscultation bilaterally  CARDIOVASCULAR: Regular rate and rhythm, no murmur, +BLE edema  ABDOMEN: Nontender to palpation, +distention  PSYCH: A+O to person, place, and time  NEUROLOGY: no FND    LABS:                        8.9    10.87 )-----------( 198      ( 27 Feb 2025 06:42 )             31.7     02-27    135  |  94[L]  |  91[H]  ----------------------------<  140[H]  4.3   |  28  |  2.26[H]    Ca    9.0      27 Feb 2025 06:42  Mg     2.6     02-27    TPro  7.2  /  Alb  3.6  /  TBili  0.6  /  DBili  x   /  AST  22  /  ALT  12  /  AlkPhos  251[H]  02-27          Urinalysis Basic - ( 27 Feb 2025 06:42 )    Color: x / Appearance: x / SG: x / pH: x  Gluc: 140 mg/dL / Ketone: x  / Bili: x / Urobili: x   Blood: x / Protein: x / Nitrite: x   Leuk Esterase: x / RBC: x / WBC x   Sq Epi: x / Non Sq Epi: x / Bacteria: x        SARS-CoV-2: NotDetec (10 Tay 2025 19:29)      RADIOLOGY & ADDITIONAL TESTS:  New Imaging Personally Reviewed Today:  New Electrocardiogram Personally Reviewed Today:  Other Results Reviewed Today:   Prior or Outpatient Records Reviewed Today with Summary:    COORDINATION OF CARE:  Consultant Communication and Details of Discussion (where applicable):

## 2025-02-27 NOTE — PROGRESS NOTE ADULT - ASSESSMENT
80 y/o male w/ PMHx CAD s/p CABG, HFrEF (LVIDd 5.2, LVEF 25-30%) w/ DC- ICD, AFib (Xarelto), severe AS s/p TAVR, COPD on 3-4L home O2, CKD3, HTN, HLD, T2DM, and BPH presented to [facility] for ADHF, despite escalation of home diuretics. Now transferred to [facility] for refractory volume overload. His labs notable for stable Scr (though unclear baseline).  Repeat TTE shows severe BiV dysfunction. s/p RRT for hypotension given 500cc bolus on 2/11    GOC clarified and he remains DNR/DNI per wife - wants the ICD on. He was being diuresed well with bumex drip augmented with diamox.     Cardiac Studies  TTE 2/7/25 LVIDd 5.3cm, LVEF 30%, no LV thrombus, TAPSE 1.0cm, severely enlarged RV size/RVSF reduced, mod dilated LA, severe dilated RA, TAVR present, mod MR, severe TR, PASP 52, PASP 52, no pericardial effusion, IVC dilated 2.35cm  TTE 8/8/24; LVIDd 5.2, EF 25-30%, global hypokinesis, severe grade III DD, TASPE 1.1, TAVR present in AV position, mld-mod MR, mod TR, PASP 53

## 2025-02-28 LAB
ANION GAP SERPL CALC-SCNC: 14 MMOL/L — SIGNIFICANT CHANGE UP (ref 5–17)
BUN SERPL-MCNC: 89 MG/DL — HIGH (ref 7–23)
CALCIUM SERPL-MCNC: 9 MG/DL — SIGNIFICANT CHANGE UP (ref 8.4–10.5)
CHLORIDE SERPL-SCNC: 94 MMOL/L — LOW (ref 96–108)
CO2 SERPL-SCNC: 28 MMOL/L — SIGNIFICANT CHANGE UP (ref 22–31)
CREAT SERPL-MCNC: 2.17 MG/DL — HIGH (ref 0.5–1.3)
EGFR: 30 ML/MIN/1.73M2 — LOW
EGFR: 30 ML/MIN/1.73M2 — LOW
GLUCOSE BLDC GLUCOMTR-MCNC: 141 MG/DL — HIGH (ref 70–99)
GLUCOSE BLDC GLUCOMTR-MCNC: 160 MG/DL — HIGH (ref 70–99)
GLUCOSE BLDC GLUCOMTR-MCNC: 188 MG/DL — HIGH (ref 70–99)
GLUCOSE BLDC GLUCOMTR-MCNC: 98 MG/DL — SIGNIFICANT CHANGE UP (ref 70–99)
GLUCOSE SERPL-MCNC: 111 MG/DL — HIGH (ref 70–99)
HCT VFR BLD CALC: 30 % — LOW (ref 39–50)
HGB BLD-MCNC: 8.5 G/DL — LOW (ref 13–17)
MAGNESIUM SERPL-MCNC: 2.4 MG/DL — SIGNIFICANT CHANGE UP (ref 1.6–2.6)
MCHC RBC-ENTMCNC: 25.4 PG — LOW (ref 27–34)
MCHC RBC-ENTMCNC: 28.3 G/DL — LOW (ref 32–36)
MCV RBC AUTO: 89.6 FL — SIGNIFICANT CHANGE UP (ref 80–100)
NRBC BLD AUTO-RTO: 0 /100 WBCS — SIGNIFICANT CHANGE UP (ref 0–0)
PHOSPHATE SERPL-MCNC: 4.6 MG/DL — HIGH (ref 2.5–4.5)
PLATELET # BLD AUTO: 201 K/UL — SIGNIFICANT CHANGE UP (ref 150–400)
POTASSIUM SERPL-MCNC: 3.7 MMOL/L — SIGNIFICANT CHANGE UP (ref 3.5–5.3)
POTASSIUM SERPL-SCNC: 3.7 MMOL/L — SIGNIFICANT CHANGE UP (ref 3.5–5.3)
RBC # BLD: 3.35 M/UL — LOW (ref 4.2–5.8)
RBC # FLD: 21.8 % — HIGH (ref 10.3–14.5)
SODIUM SERPL-SCNC: 136 MMOL/L — SIGNIFICANT CHANGE UP (ref 135–145)
WBC # BLD: 10.17 K/UL — SIGNIFICANT CHANGE UP (ref 3.8–10.5)
WBC # FLD AUTO: 10.17 K/UL — SIGNIFICANT CHANGE UP (ref 3.8–10.5)

## 2025-02-28 PROCEDURE — 99232 SBSQ HOSP IP/OBS MODERATE 35: CPT

## 2025-02-28 PROCEDURE — 99233 SBSQ HOSP IP/OBS HIGH 50: CPT

## 2025-02-28 RX ORDER — SODIUM CHLORIDE 3 G/100ML
150 INJECTION, SOLUTION INTRAVENOUS ONCE
Refills: 0 | Status: COMPLETED | OUTPATIENT
Start: 2025-02-28 | End: 2025-02-28

## 2025-02-28 RX ORDER — CHLOROTHIAZIDE 250 MG/1
500 TABLET ORAL ONCE
Refills: 0 | Status: COMPLETED | OUTPATIENT
Start: 2025-02-28 | End: 2025-02-28

## 2025-02-28 RX ADMIN — SODIUM CHLORIDE 300 MILLILITER(S): 3 INJECTION, SOLUTION INTRAVENOUS at 14:10

## 2025-02-28 RX ADMIN — Medication 2 SPRAY(S): at 05:15

## 2025-02-28 RX ADMIN — FOLIC ACID 1 MILLIGRAM(S): 1 TABLET ORAL at 14:06

## 2025-02-28 RX ADMIN — Medication 1 SPRAY(S): at 17:48

## 2025-02-28 RX ADMIN — RIVAROXABAN 15 MILLIGRAM(S): 10 TABLET, FILM COATED ORAL at 17:45

## 2025-02-28 RX ADMIN — INSULIN GLARGINE-YFGN 18 UNIT(S): 100 INJECTION, SOLUTION SUBCUTANEOUS at 21:37

## 2025-02-28 RX ADMIN — ISOSORBDIE DINITRATE 20 MILLIGRAM(S): 30 TABLET ORAL at 05:14

## 2025-02-28 RX ADMIN — CHLOROTHIAZIDE 100 MILLIGRAM(S): 250 TABLET ORAL at 14:11

## 2025-02-28 RX ADMIN — INSULIN LISPRO 3 UNIT(S): 100 INJECTION, SOLUTION INTRAVENOUS; SUBCUTANEOUS at 13:12

## 2025-02-28 RX ADMIN — Medication 50 MILLIGRAM(S): at 21:37

## 2025-02-28 RX ADMIN — TAMSULOSIN HYDROCHLORIDE 0.4 MILLIGRAM(S): 0.4 CAPSULE ORAL at 21:37

## 2025-02-28 RX ADMIN — TIOTROPIUM BROMIDE INHALATION SPRAY 2 PUFF(S): 3.12 SPRAY, METERED RESPIRATORY (INHALATION) at 14:13

## 2025-02-28 RX ADMIN — Medication 50 MILLIGRAM(S): at 06:44

## 2025-02-28 RX ADMIN — METOPROLOL SUCCINATE 50 MILLIGRAM(S): 50 TABLET, EXTENDED RELEASE ORAL at 05:14

## 2025-02-28 RX ADMIN — Medication 1 DOSE(S): at 17:48

## 2025-02-28 RX ADMIN — ISOSORBDIE DINITRATE 20 MILLIGRAM(S): 30 TABLET ORAL at 14:06

## 2025-02-28 RX ADMIN — Medication 50 MILLIGRAM(S): at 14:06

## 2025-02-28 RX ADMIN — BUMETANIDE 2 MILLIGRAM(S): 1 TABLET ORAL at 14:07

## 2025-02-28 RX ADMIN — INSULIN LISPRO 3 UNIT(S): 100 INJECTION, SOLUTION INTRAVENOUS; SUBCUTANEOUS at 09:21

## 2025-02-28 RX ADMIN — INSULIN LISPRO 3 UNIT(S): 100 INJECTION, SOLUTION INTRAVENOUS; SUBCUTANEOUS at 17:46

## 2025-02-28 RX ADMIN — BUMETANIDE 2 MILLIGRAM(S): 1 TABLET ORAL at 05:14

## 2025-02-28 RX ADMIN — Medication 2 SPRAY(S): at 17:47

## 2025-02-28 RX ADMIN — ATORVASTATIN CALCIUM 20 MILLIGRAM(S): 80 TABLET, FILM COATED ORAL at 21:37

## 2025-02-28 RX ADMIN — ISOSORBDIE DINITRATE 20 MILLIGRAM(S): 30 TABLET ORAL at 17:49

## 2025-02-28 RX ADMIN — Medication 1 SPRAY(S): at 05:15

## 2025-02-28 RX ADMIN — Medication 1 DOSE(S): at 05:15

## 2025-02-28 RX ADMIN — Medication 40 MILLIEQUIVALENT(S): at 09:22

## 2025-02-28 RX ADMIN — INSULIN LISPRO 2: 100 INJECTION, SOLUTION INTRAVENOUS; SUBCUTANEOUS at 17:45

## 2025-02-28 NOTE — PROGRESS NOTE ADULT - ASSESSMENT
80 y/o male w/ PMHx CAD s/p CABG, HF (combined HFrEF [EF 25%] and HFpEF [G3DD]) w/ ICD, AFib on Xarelto, severe AS s/p TAVR, COPD on 3-4L home O2, CKD4, HTN, HLD, T2DM, and BPH who presents as a transfer from [facility] for HF evaluation. Ongoing CHF exacerbation now off bumex gtt, new DENISHA on CKD

## 2025-02-28 NOTE — PROGRESS NOTE ADULT - SUBJECTIVE AND OBJECTIVE BOX
[facility] Division of Hospital Medicine  Hanane Clark MD  Available via MS Teams    SUBJECTIVE / OVERNIGHT EVENTS:  No acute events overnight. No further epistaxis.    MEDICATIONS  (STANDING):  atorvastatin 20 milliGRAM(s) Oral at bedtime  buMETAnide Injectable 2 milliGRAM(s) IV Push two times a day  dextrose 5%. 1000 milliLiter(s) (50 mL/Hr) IV Continuous <Continuous>  dextrose 5%. 1000 milliLiter(s) (100 mL/Hr) IV Continuous <Continuous>  dextrose 50% Injectable 25 Gram(s) IV Push once  dextrose 50% Injectable 12.5 Gram(s) IV Push once  dextrose 50% Injectable 25 Gram(s) IV Push once  fluticasone propionate/ salmeterol 250-50 MICROgram(s) Diskus 1 Dose(s) Inhalation two times a day  folic acid 1 milliGRAM(s) Oral daily  glucagon  Injectable 1 milliGRAM(s) IntraMuscular once  hydrALAZINE 50 milliGRAM(s) Oral three times a day  insulin glargine Injectable (LANTUS) 18 Unit(s) SubCutaneous at bedtime  insulin lispro (ADMELOG) corrective regimen sliding scale   SubCutaneous three times a day before meals  insulin lispro Injectable (ADMELOG) 3 Unit(s) SubCutaneous three times a day before meals  isosorbide   dinitrate Tablet (ISORDIL) 20 milliGRAM(s) Oral three times a day  metoprolol succinate ER 50 milliGRAM(s) Oral daily  oxymetazoline 0.05% Nasal Spray 2 Spray(s) Right Nostril every 12 hours  rivaroxaban 15 milliGRAM(s) Oral with dinner  sodium chloride 0.65% Nasal 1 Spray(s) Both Nostrils two times a day  sodium chloride 3% Bolus 150 milliLiter(s) IV Bolus once  tamsulosin 0.4 milliGRAM(s) Oral at bedtime  tiotropium 2.5 MICROgram(s) Inhaler 2 Puff(s) Inhalation daily    MEDICATIONS  (PRN):  acetaminophen     Tablet .. 650 milliGRAM(s) Oral every 6 hours PRN Temp greater or equal to 38C (100.4F), Mild Pain (1 - 3)  albuterol/ipratropium for Nebulization 3 milliLiter(s) Nebulizer every 6 hours PRN Shortness of Breath and/or Wheezing  dextrose Oral Gel 15 Gram(s) Oral once PRN Blood Glucose LESS THAN 70 milliGRAM(s)/deciliter  dextrose Oral Gel 15 Gram(s) Oral once PRN Blood Glucose LESS THAN 70 milliGRAM(s)/deciliter  melatonin 3 milliGRAM(s) Oral at bedtime PRN Insomnia      I&O's Summary    27 Feb 2025 07:01  -  28 Feb 2025 07:00  --------------------------------------------------------  IN: 1060 mL / OUT: 2700 mL / NET: -1640 mL        PHYSICAL EXAM:  Vital Signs Last 24 Hrs  T(C): 36.4 (28 Feb 2025 11:14), Max: 37 (28 Feb 2025 01:01)  T(F): 97.6 (28 Feb 2025 11:14), Max: 98.6 (28 Feb 2025 01:01)  HR: 72 (28 Feb 2025 11:14) (70 - 77)  BP: 113/67 (28 Feb 2025 11:14) (104/60 - 146/63)  BP(mean): --  RR: 18 (28 Feb 2025 11:14) (18 - 18)  SpO2: 93% (28 Feb 2025 11:14) (93% - 98%)    Parameters below as of 28 Feb 2025 11:14  Patient On (Oxygen Delivery Method): room air      CONSTITUTIONAL: NAD  EYES: EOMI, conjunctiva and sclera clear  NECK: Supple  RESPIRATORY: Normal respiratory effort; lungs are clear to auscultation bilaterally  CARDIOVASCULAR: Regular rate and rhythm, no murmur, +BLE edema  ABDOMEN: Nontender to palpation, +distention  PSYCH: A+O to person, place, and time  NEUROLOGY: no FND    LABS:                        8.5    10.17 )-----------( 201      ( 28 Feb 2025 07:10 )             30.0     02-28    136  |  94[L]  |  89[H]  ----------------------------<  111[H]  3.7   |  28  |  2.17[H]    Ca    9.0      28 Feb 2025 07:10  Phos  4.6     02-28  Mg     2.4     02-28    TPro  7.2  /  Alb  3.6  /  TBili  0.6  /  DBili  x   /  AST  22  /  ALT  12  /  AlkPhos  251[H]  02-27          Urinalysis Basic - ( 28 Feb 2025 07:10 )    Color: x / Appearance: x / SG: x / pH: x  Gluc: 111 mg/dL / Ketone: x  / Bili: x / Urobili: x   Blood: x / Protein: x / Nitrite: x   Leuk Esterase: x / RBC: x / WBC x   Sq Epi: x / Non Sq Epi: x / Bacteria: x        SARS-CoV-2: NotDetec (10 Tay 2025 19:29)      RADIOLOGY & ADDITIONAL TESTS:  New Imaging Personally Reviewed Today:  New Electrocardiogram Personally Reviewed Today:  Other Results Reviewed Today:   Prior or Outpatient Records Reviewed Today with Summary:    COORDINATION OF CARE:  Consultant Communication and Details of Discussion (where applicable):

## 2025-02-28 NOTE — PROGRESS NOTE ADULT - ASSESSMENT
78 y/o male w/ PMHx CAD s/p CABG, HFrEF (LVIDd 5.2, LVEF 25-30%) w/ DC- ICD, AFib (Xarelto), severe AS s/p TAVR, COPD on 3-4L home O2, CKD3, HTN, HLD, T2DM, and BPH presented to [facility] for ADHF, despite escalation of home diuretics. Now transferred to [facility] for refractory volume overload. His labs notable for stable Scr (though unclear baseline).  Repeat TTE shows severe BiV dysfunction. s/p RRT for hypotension given 500cc bolus on 2/11    GOC clarified and he remains DNR/DNI per wife - wants the ICD on. He was being diuresed well with bumex drip augmented with diamox.     Cardiac Studies  TTE 2/7/25 LVIDd 5.3cm, LVEF 30%, no LV thrombus, TAPSE 1.0cm, severely enlarged RV size/RVSF reduced, mod dilated LA, severe dilated RA, TAVR present, mod MR, severe TR, PASP 52, PASP 52, no pericardial effusion, IVC dilated 2.35cm  TTE 8/8/24; LVIDd 5.2, EF 25-30%, global hypokinesis, severe grade III DD, TASPE 1.1, TAVR present in AV position, mld-mod MR, mod TR, PASP 53

## 2025-02-28 NOTE — PROGRESS NOTE ADULT - PROBLEM SELECTOR PLAN 1
: Etiology likely ischemic. Primary cardiologist Dr. Jewel Stubbs ([facility]), last TTE LVEF 25-30%. Appears has been on some GDMT as outpt (?Farxiga, Coreg and previously on ARNI though unclear why stopped)  - Toprol XL 50mg QD  - increase hydral to 50, isordil to 20  - hold entresto and dino for DENISHA  - continue bumex 2mg IV BID, repeat augmentation with diuril 500mg IV x1  - on lasix 40 BID PO at home  - Has ICD in place; Currently a DNR/DNI: trial NIV. Discussed with palliative and at this time wants ICD on as per wife  - Aggressive PT  - standing weights documented does not appear accurate  - Strict I/O's  - Target electrolyte repletion to target K+ >4.0 and Mg >2.0.  - close HF follow up  - likely not a good candidate for MEMs

## 2025-02-28 NOTE — PROGRESS NOTE ADULT - NS ATTEST RISK PROBLEM GEN_ALL_CORE FT
Face-to-face encounter, review of extensive medical records in this and prior charts, laboratory findings, radiographic and imaging/diagnostic results; documentation as noted above and discussion of diagnostic impressions and plan with the patient and team.    Titration of vasoactive heart failure medications. High risk of decompensation
preparation, patient care, and documentation for this visit, including the following: review of clinical lab tests; review of medical tests/procedures/services, obtaining and/or reviewing separately obtained history, counseling and educating the patient/family/caregiver, referring and communicating with other health care professionals (when not separately reported), documenting clinical information in the electronic health record, and care coordination (not separately reported).
Face-to-face encounter, review of extensive medical records in this and prior charts, laboratory findings, radiographic and imaging/diagnostic results; documentation as noted above and discussion of diagnostic impressions and plan with the patient and team.    Titration of vasoactive heart failure medications. High risk of decompensation
preparation, patient care, and documentation for this visit, including the following: review of clinical lab tests; review of medical tests/procedures/services, obtaining and/or reviewing separately obtained history, counseling and educating the patient/family/caregiver, referring and communicating with other health care professionals (when not separately reported), documenting clinical information in the electronic health record, and care coordination (not separately reported).

## 2025-02-28 NOTE — PROGRESS NOTE ADULT - SUBJECTIVE AND OBJECTIVE BOX
Patient seen and examined at bedside.    Overnight Events: breathing better    REVIEW OF SYSTEMS:  CONSTITUTIONAL: No weakness, fevers or chills  EYES/ENT: No visual changes;  No dysphagia  NECK: No pain or stiffness  RESPIRATORY: No cough, wheezing, hemoptysis; No shortness of breath  CARDIOVASCULAR: No chest pain or palpitations; No lower extremity edema  GASTROINTESTINAL: No abdominal or epigastric pain. No nausea, vomiting, or hematemesis; No diarrhea or constipation. No melena or hematochezia.  BACK: No back pain  GENITOURINARY: No dysuria, frequency or hematuria  NEUROLOGICAL: No numbness or weakness  SKIN: No itching, burning, rashes, or lesions   All other review of systems is negative unless indicated above.            Current Meds:  acetaminophen     Tablet .. 650 milliGRAM(s) Oral every 6 hours PRN  albuterol/ipratropium for Nebulization 3 milliLiter(s) Nebulizer every 6 hours PRN  atorvastatin 20 milliGRAM(s) Oral at bedtime  buMETAnide Injectable 2 milliGRAM(s) IV Push two times a day  dextrose 5%. 1000 milliLiter(s) IV Continuous <Continuous>  dextrose 5%. 1000 milliLiter(s) IV Continuous <Continuous>  dextrose 50% Injectable 25 Gram(s) IV Push once  dextrose 50% Injectable 12.5 Gram(s) IV Push once  dextrose 50% Injectable 25 Gram(s) IV Push once  dextrose Oral Gel 15 Gram(s) Oral once PRN  dextrose Oral Gel 15 Gram(s) Oral once PRN  fluticasone propionate/ salmeterol 250-50 MICROgram(s) Diskus 1 Dose(s) Inhalation two times a day  folic acid 1 milliGRAM(s) Oral daily  glucagon  Injectable 1 milliGRAM(s) IntraMuscular once  hydrALAZINE 50 milliGRAM(s) Oral three times a day  insulin glargine Injectable (LANTUS) 18 Unit(s) SubCutaneous at bedtime  insulin lispro (ADMELOG) corrective regimen sliding scale   SubCutaneous three times a day before meals  insulin lispro Injectable (ADMELOG) 3 Unit(s) SubCutaneous three times a day before meals  isosorbide   dinitrate Tablet (ISORDIL) 20 milliGRAM(s) Oral three times a day  melatonin 3 milliGRAM(s) Oral at bedtime PRN  metoprolol succinate ER 50 milliGRAM(s) Oral daily  oxymetazoline 0.05% Nasal Spray 2 Spray(s) Right Nostril every 12 hours  rivaroxaban 15 milliGRAM(s) Oral with dinner  sodium chloride 0.65% Nasal 1 Spray(s) Both Nostrils two times a day  tamsulosin 0.4 milliGRAM(s) Oral at bedtime  tiotropium 2.5 MICROgram(s) Inhaler 2 Puff(s) Inhalation daily      Vitals:  T(F): 98.1 (02-28), Max: 98.6 (02-28)  HR: 71 (02-28) (70 - 77)  BP: 116/63 (02-28) (104/60 - 146/63)  RR: 18 (02-28)  SpO2: 97% (02-28)  I&O's Summary    27 Feb 2025 07:01  -  28 Feb 2025 07:00  --------------------------------------------------------  IN: 1060 mL / OUT: 2700 mL / NET: -1640 mL        Physical Exam:  GEN: NAD  HEENT: EOMI, clear sclera  PULM: CTA b/l, no wheeze  CV: RRR S1 S2, no murmur, +JVD  ABD: S, NT, ND  EXT: WWP, wrapped  PSYCH: normal affect  SKIN: No rash                          8.5    10.17 )-----------( 201      ( 28 Feb 2025 07:10 )             30.0     02-28    136  |  94[L]  |  89[H]  ----------------------------<  111[H]  3.7   |  28  |  2.17[H]    Ca    9.0      28 Feb 2025 07:10  Phos  4.6     02-28  Mg     2.4     02-28    TPro  7.2  /  Alb  3.6  /  TBili  0.6  /  DBili  x   /  AST  22  /  ALT  12  /  AlkPhos  251[H]  02-27

## 2025-02-28 NOTE — PROGRESS NOTE ADULT - SUBJECTIVE AND OBJECTIVE BOX
No distress, on NC O2    Vital Signs Last 24 Hrs  T(C): 36.4 (02-28-25 @ 11:14), Max: 37 (02-28-25 @ 01:01)  T(F): 97.6 (02-28-25 @ 11:14), Max: 98.6 (02-28-25 @ 01:01)  HR: 72 (02-28-25 @ 11:14) (70 - 77)  BP: 113/67 (02-28-25 @ 11:14) (104/60 - 146/63)  RR: 18 (02-28-25 @ 11:14) (18 - 18)  SpO2: 93% (02-28-25 @ 11:14) (93% - 97%)    I&O's Detail    27 Feb 2025 07:01  -  28 Feb 2025 07:00  --------------------------------------------------------  IN:    Oral Fluid: 1060 mL  Total IN: 1060 mL    OUT:    Voided (mL): 2700 mL  Total OUT: 2700 mL    28 Feb 2025 07:01  -  28 Feb 2025 16:08  --------------------------------------------------------  OUT:    Voided (mL): 800 mL  Total OUT: 800 mL    s1s2  b/l air entry  soft, ND  sm edema                                                      8.5    10.17 )-----------( 201      ( 28 Feb 2025 07:10 )             30.0     28 Feb 2025 07:10    136    |  94     |  89     ----------------------------<  111    3.7     |  28     |  2.17     Ca    9.0        28 Feb 2025 07:10  Phos  4.6       28 Feb 2025 07:10  Mg     2.4       28 Feb 2025 07:10    TPro  7.2    /  Alb  3.6    /  TBili  0.6    /  DBili  x      /  AST  22     /  ALT  12     /  AlkPhos  251    27 Feb 2025 06:42    LIVER FUNCTIONS - ( 27 Feb 2025 06:42 )  Alb: 3.6 g/dL / Pro: 7.2 g/dL / ALK PHOS: 251 U/L / ALT: 12 U/L / AST: 22 U/L / GGT: x           acetaminophen     Tablet .. 650 milliGRAM(s) Oral every 6 hours PRN  albuterol/ipratropium for Nebulization 3 milliLiter(s) Nebulizer every 6 hours PRN  atorvastatin 20 milliGRAM(s) Oral at bedtime  buMETAnide Injectable 2 milliGRAM(s) IV Push two times a day  dextrose 5%. 1000 milliLiter(s) IV Continuous <Continuous>  dextrose 5%. 1000 milliLiter(s) IV Continuous <Continuous>  dextrose 50% Injectable 25 Gram(s) IV Push once  dextrose 50% Injectable 12.5 Gram(s) IV Push once  dextrose 50% Injectable 25 Gram(s) IV Push once  dextrose Oral Gel 15 Gram(s) Oral once PRN  dextrose Oral Gel 15 Gram(s) Oral once PRN  fluticasone propionate/ salmeterol 250-50 MICROgram(s) Diskus 1 Dose(s) Inhalation two times a day  folic acid 1 milliGRAM(s) Oral daily  glucagon  Injectable 1 milliGRAM(s) IntraMuscular once  hydrALAZINE 50 milliGRAM(s) Oral three times a day  insulin glargine Injectable (LANTUS) 18 Unit(s) SubCutaneous at bedtime  insulin lispro (ADMELOG) corrective regimen sliding scale   SubCutaneous three times a day before meals  insulin lispro Injectable (ADMELOG) 3 Unit(s) SubCutaneous three times a day before meals  isosorbide   dinitrate Tablet (ISORDIL) 20 milliGRAM(s) Oral three times a day  melatonin 3 milliGRAM(s) Oral at bedtime PRN  metoprolol succinate ER 50 milliGRAM(s) Oral daily  oxymetazoline 0.05% Nasal Spray 2 Spray(s) Right Nostril every 12 hours  rivaroxaban 15 milliGRAM(s) Oral with dinner  sodium chloride 0.65% Nasal 1 Spray(s) Both Nostrils two times a day  tamsulosin 0.4 milliGRAM(s) Oral at bedtime  tiotropium 2.5 MICROgram(s) Inhaler 2 Puff(s) Inhalation daily    A/P:    CM, EF ~ 30%, severe TR  Adm to [facility] VS 1/10 w/fluid overload   Tx to [facility] 2/5 for further management   Aggressive diuresis w/improvement   Known CKD 3, stable (baseline Cr ~ 2. - 2.5)  Continue diuresis per HF team  Good UO  F/u BMP, Mg  Avoid nephrotoxins as possible     [phone]

## 2025-02-28 NOTE — PROGRESS NOTE ADULT - ATTENDING COMMENTS
79-year-old male past medical history ischemic cardiomyopathy status post CABG, HFrEF, COPD, hypertension, hyperlipidemia, diabetes, CKD presenting with ADHF.  Course has been complicated by fluctuating kidney function in the setting of diuresis.    Overall prognosis is poor given multiple co-morbidities and age. He was offered RHC/CardioMEMs but declined.  He is now DNR/DNI.     On exam he is warm and well-perfused.  He is still moderately hypervolemic with JVP ~12 cm and peripheral edema but responding to IV diuresis with variable response. Recommend continuing IV Bumex 2 mg twice daily, hypertonic saline, and IV diuril 500 mg for net negative goal 2-3L. GDMT is limited due to CKD.  Recommend continuing hydralazine 50 mg 3 times daily and Isordil 20 mg 3 times daily.  Continue metoprolol XL 50 mg daily. Recommend continuing current regimen through weekend.    Loc Pride MD  Interventional Cardiology/Advanced Heart Failure Transplant

## 2025-02-28 NOTE — PROGRESS NOTE ADULT - PROBLEM SELECTOR PLAN 1
EF 30% with severe tricuspid regurg and PASP 55mmHG on 2/7/24  - Diuretic: IV bumex 2 mg BID with HTS daily and diuril daily  - BB:  Metoprolol succinate 50mg day  - ARNI/ACE-I/ARB: hold entresto due to DENISHA  - Hydralazine/Nitrate: hydral 50mg TID. Isordil 20mg TID   - MRA: aldactone  - SGLT2: none   dry wt is 243 lbs  - daily standing weights

## 2025-03-01 DIAGNOSIS — K92.1 MELENA: ICD-10-CM

## 2025-03-01 LAB
ANION GAP SERPL CALC-SCNC: 14 MMOL/L — SIGNIFICANT CHANGE UP (ref 5–17)
BUN SERPL-MCNC: 92 MG/DL — HIGH (ref 7–23)
CALCIUM SERPL-MCNC: 9.1 MG/DL — SIGNIFICANT CHANGE UP (ref 8.4–10.5)
CHLORIDE SERPL-SCNC: 94 MMOL/L — LOW (ref 96–108)
CO2 SERPL-SCNC: 29 MMOL/L — SIGNIFICANT CHANGE UP (ref 22–31)
CREAT SERPL-MCNC: 2.06 MG/DL — HIGH (ref 0.5–1.3)
EGFR: 32 ML/MIN/1.73M2 — LOW
EGFR: 32 ML/MIN/1.73M2 — LOW
GLUCOSE BLDC GLUCOMTR-MCNC: 122 MG/DL — HIGH (ref 70–99)
GLUCOSE BLDC GLUCOMTR-MCNC: 167 MG/DL — HIGH (ref 70–99)
GLUCOSE BLDC GLUCOMTR-MCNC: 196 MG/DL — HIGH (ref 70–99)
GLUCOSE BLDC GLUCOMTR-MCNC: 196 MG/DL — HIGH (ref 70–99)
GLUCOSE SERPL-MCNC: 113 MG/DL — HIGH (ref 70–99)
HCT VFR BLD CALC: 30 % — LOW (ref 39–50)
HGB BLD-MCNC: 8.5 G/DL — LOW (ref 13–17)
MAGNESIUM SERPL-MCNC: 2.4 MG/DL — SIGNIFICANT CHANGE UP (ref 1.6–2.6)
MCHC RBC-ENTMCNC: 25.6 PG — LOW (ref 27–34)
MCHC RBC-ENTMCNC: 28.3 G/DL — LOW (ref 32–36)
MCV RBC AUTO: 90.4 FL — SIGNIFICANT CHANGE UP (ref 80–100)
NRBC BLD AUTO-RTO: 0 /100 WBCS — SIGNIFICANT CHANGE UP (ref 0–0)
PLATELET # BLD AUTO: 200 K/UL — SIGNIFICANT CHANGE UP (ref 150–400)
POTASSIUM SERPL-MCNC: 3.7 MMOL/L — SIGNIFICANT CHANGE UP (ref 3.5–5.3)
POTASSIUM SERPL-SCNC: 3.7 MMOL/L — SIGNIFICANT CHANGE UP (ref 3.5–5.3)
RBC # BLD: 3.32 M/UL — LOW (ref 4.2–5.8)
RBC # FLD: 22.3 % — HIGH (ref 10.3–14.5)
SODIUM SERPL-SCNC: 137 MMOL/L — SIGNIFICANT CHANGE UP (ref 135–145)
WBC # BLD: 10.3 K/UL — SIGNIFICANT CHANGE UP (ref 3.8–10.5)
WBC # FLD AUTO: 10.3 K/UL — SIGNIFICANT CHANGE UP (ref 3.8–10.5)

## 2025-03-01 PROCEDURE — 99233 SBSQ HOSP IP/OBS HIGH 50: CPT | Mod: GC

## 2025-03-01 RX ORDER — ASPIRIN 325 MG
81 TABLET ORAL DAILY
Refills: 0 | Status: DISCONTINUED | OUTPATIENT
Start: 2025-03-01 | End: 2025-03-15

## 2025-03-01 RX ADMIN — RIVAROXABAN 15 MILLIGRAM(S): 10 TABLET, FILM COATED ORAL at 17:50

## 2025-03-01 RX ADMIN — Medication 1 DOSE(S): at 17:51

## 2025-03-01 RX ADMIN — INSULIN LISPRO 2: 100 INJECTION, SOLUTION INTRAVENOUS; SUBCUTANEOUS at 17:49

## 2025-03-01 RX ADMIN — TAMSULOSIN HYDROCHLORIDE 0.4 MILLIGRAM(S): 0.4 CAPSULE ORAL at 21:57

## 2025-03-01 RX ADMIN — TIOTROPIUM BROMIDE INHALATION SPRAY 2 PUFF(S): 3.12 SPRAY, METERED RESPIRATORY (INHALATION) at 14:44

## 2025-03-01 RX ADMIN — ISOSORBDIE DINITRATE 20 MILLIGRAM(S): 30 TABLET ORAL at 06:06

## 2025-03-01 RX ADMIN — ISOSORBDIE DINITRATE 20 MILLIGRAM(S): 30 TABLET ORAL at 13:00

## 2025-03-01 RX ADMIN — INSULIN LISPRO 2: 100 INJECTION, SOLUTION INTRAVENOUS; SUBCUTANEOUS at 13:06

## 2025-03-01 RX ADMIN — Medication 50 MILLIGRAM(S): at 21:57

## 2025-03-01 RX ADMIN — ISOSORBDIE DINITRATE 20 MILLIGRAM(S): 30 TABLET ORAL at 17:50

## 2025-03-01 RX ADMIN — INSULIN LISPRO 3 UNIT(S): 100 INJECTION, SOLUTION INTRAVENOUS; SUBCUTANEOUS at 13:07

## 2025-03-01 RX ADMIN — BUMETANIDE 2 MILLIGRAM(S): 1 TABLET ORAL at 06:06

## 2025-03-01 RX ADMIN — Medication 40 MILLIEQUIVALENT(S): at 11:49

## 2025-03-01 RX ADMIN — Medication 81 MILLIGRAM(S): at 17:52

## 2025-03-01 RX ADMIN — Medication 1 SPRAY(S): at 06:06

## 2025-03-01 RX ADMIN — INSULIN LISPRO 3 UNIT(S): 100 INJECTION, SOLUTION INTRAVENOUS; SUBCUTANEOUS at 17:53

## 2025-03-01 RX ADMIN — INSULIN GLARGINE-YFGN 18 UNIT(S): 100 INJECTION, SOLUTION SUBCUTANEOUS at 21:57

## 2025-03-01 RX ADMIN — Medication 50 MILLIGRAM(S): at 06:06

## 2025-03-01 RX ADMIN — Medication 50 MILLIGRAM(S): at 14:43

## 2025-03-01 RX ADMIN — Medication 40 MILLIGRAM(S): at 17:53

## 2025-03-01 RX ADMIN — Medication 1 SPRAY(S): at 17:51

## 2025-03-01 RX ADMIN — METOPROLOL SUCCINATE 50 MILLIGRAM(S): 50 TABLET, EXTENDED RELEASE ORAL at 09:12

## 2025-03-01 RX ADMIN — Medication 1 DOSE(S): at 06:05

## 2025-03-01 RX ADMIN — BUMETANIDE 2 MILLIGRAM(S): 1 TABLET ORAL at 16:36

## 2025-03-01 RX ADMIN — INSULIN LISPRO 3 UNIT(S): 100 INJECTION, SOLUTION INTRAVENOUS; SUBCUTANEOUS at 09:11

## 2025-03-01 RX ADMIN — ATORVASTATIN CALCIUM 20 MILLIGRAM(S): 80 TABLET, FILM COATED ORAL at 21:57

## 2025-03-01 RX ADMIN — FOLIC ACID 1 MILLIGRAM(S): 1 TABLET ORAL at 11:50

## 2025-03-01 NOTE — PROGRESS NOTE ADULT - ASSESSMENT
78 y/o male w/ PMHx CAD s/p CABG, HF (combined HFrEF [EF 25%] and HFpEF [G3DD]) w/ ICD, AFib on Xarelto, severe AS s/p TAVR, COPD on 3-4L home O2, CKD4, HTN, HLD, T2DM, and BPH who presents as a transfer from [facility] for HF evaluation. Ongoing CHF exacerbation now off bumex gtt, new DENISHA on CKD

## 2025-03-01 NOTE — PROGRESS NOTE ADULT - PROBLEM SELECTOR PLAN 1
EF 30% with severe tricuspid regurg and PASP 55mmHG on 2/7/24  - Diuretic: IV bumex 2 mg BID with HTS daily and diuril daily  - BB:  Metoprolol succinate 50mg day  - ARNI/ACE-I/ARB: hold entresto due to DENISHA  - Hydralazine/Nitrate: hydral 50mg TID. Isordil 20mg TID   - MRA: aldactone  - SGLT2: none  - daily standing weights.  dry wt is 243 lbs.   - Cardiology following, patient declined RHC and cardiomems

## 2025-03-01 NOTE — PROGRESS NOTE ADULT - SUBJECTIVE AND OBJECTIVE BOX
[facility] Division of Hospital Medicine  Gunner Newton DO  Available via MS Teams    SUBJECTIVE / OVERNIGHT EVENTS:  NAEO. Resting comfortably in bed on examination. Denies complaints at this time .    MEDICATIONS  (STANDING):  atorvastatin 20 milliGRAM(s) Oral at bedtime  buMETAnide Injectable 2 milliGRAM(s) IV Push two times a day  dextrose 5%. 1000 milliLiter(s) (50 mL/Hr) IV Continuous <Continuous>  dextrose 5%. 1000 milliLiter(s) (100 mL/Hr) IV Continuous <Continuous>  dextrose 50% Injectable 25 Gram(s) IV Push once  dextrose 50% Injectable 12.5 Gram(s) IV Push once  dextrose 50% Injectable 25 Gram(s) IV Push once  fluticasone propionate/ salmeterol 250-50 MICROgram(s) Diskus 1 Dose(s) Inhalation two times a day  folic acid 1 milliGRAM(s) Oral daily  glucagon  Injectable 1 milliGRAM(s) IntraMuscular once  hydrALAZINE 50 milliGRAM(s) Oral three times a day  insulin glargine Injectable (LANTUS) 18 Unit(s) SubCutaneous at bedtime  insulin lispro (ADMELOG) corrective regimen sliding scale   SubCutaneous three times a day before meals  insulin lispro Injectable (ADMELOG) 3 Unit(s) SubCutaneous three times a day before meals  isosorbide   dinitrate Tablet (ISORDIL) 20 milliGRAM(s) Oral three times a day  metoprolol succinate ER 50 milliGRAM(s) Oral daily  rivaroxaban 15 milliGRAM(s) Oral with dinner  sodium chloride 0.65% Nasal 1 Spray(s) Both Nostrils two times a day  tamsulosin 0.4 milliGRAM(s) Oral at bedtime  tiotropium 2.5 MICROgram(s) Inhaler 2 Puff(s) Inhalation daily    MEDICATIONS  (PRN):  acetaminophen     Tablet .. 650 milliGRAM(s) Oral every 6 hours PRN Temp greater or equal to 38C (100.4F), Mild Pain (1 - 3)  albuterol/ipratropium for Nebulization 3 milliLiter(s) Nebulizer every 6 hours PRN Shortness of Breath and/or Wheezing  dextrose Oral Gel 15 Gram(s) Oral once PRN Blood Glucose LESS THAN 70 milliGRAM(s)/deciliter  dextrose Oral Gel 15 Gram(s) Oral once PRN Blood Glucose LESS THAN 70 milliGRAM(s)/deciliter  melatonin 3 milliGRAM(s) Oral at bedtime PRN Insomnia      I&O's Summary    28 Feb 2025 07:01  -  01 Mar 2025 07:00  --------------------------------------------------------  IN: 100 mL / OUT: 2180 mL / NET: -2080 mL    01 Mar 2025 07:01  -  01 Mar 2025 13:37  --------------------------------------------------------  IN: 100 mL / OUT: 0 mL / NET: 100 mL        PHYSICAL EXAM:  Vital Signs Last 24 Hrs  T(C): 36.2 (01 Mar 2025 11:27), Max: 36.8 (28 Feb 2025 20:45)  T(F): 97.2 (01 Mar 2025 11:27), Max: 98.2 (28 Feb 2025 20:45)  HR: 70 (01 Mar 2025 11:27) (70 - 72)  BP: 106/64 (01 Mar 2025 11:27) (106/64 - 115/62)  BP(mean): --  RR: 19 (01 Mar 2025 11:27) (18 - 19)  SpO2: 99% (01 Mar 2025 11:27) (98% - 99%)    Parameters below as of 01 Mar 2025 11:27  Patient On (Oxygen Delivery Method): room air      CONSTITUTIONAL: NAD  EYES: EOMI, conjunctiva and sclera clear  NECK: Supple  RESPIRATORY: Normal respiratory effort; lungs are clear to auscultation bilaterally  CARDIOVASCULAR: Regular rate and rhythm, no murmur, + pitting BLE edema  ABDOMEN: Nontender to palpation, +distention  PSYCH: A+O to person, place, and time  NEUROLOGY: no FND    LABS:                         8.5    10.30 )-----------( 200      ( 01 Mar 2025 07:11 )             30.0     03-01    137  |  94[L]  |  92[H]  ----------------------------<  113[H]  3.7   |  29  |  2.06[H]    Ca    9.1      01 Mar 2025 07:10  Phos  4.6     02-28  Mg     2.4     03-01        Urinalysis Basic - ( 01 Mar 2025 07:10 )    Color: x / Appearance: x / SG: x / pH: x  Gluc: 113 mg/dL / Ketone: x  / Bili: x / Urobili: x   Blood: x / Protein: x / Nitrite: x   Leuk Esterase: x / RBC: x / WBC x   Sq Epi: x / Non Sq Epi: x / Bacteria: x        Labs and imaging reviewed

## 2025-03-02 LAB
ANION GAP SERPL CALC-SCNC: 14 MMOL/L — SIGNIFICANT CHANGE UP (ref 5–17)
BUN SERPL-MCNC: 91 MG/DL — HIGH (ref 7–23)
CALCIUM SERPL-MCNC: 9.1 MG/DL — SIGNIFICANT CHANGE UP (ref 8.4–10.5)
CHLORIDE SERPL-SCNC: 95 MMOL/L — LOW (ref 96–108)
CO2 SERPL-SCNC: 27 MMOL/L — SIGNIFICANT CHANGE UP (ref 22–31)
CREAT SERPL-MCNC: 2.06 MG/DL — HIGH (ref 0.5–1.3)
EGFR: 32 ML/MIN/1.73M2 — LOW
EGFR: 32 ML/MIN/1.73M2 — LOW
GLUCOSE BLDC GLUCOMTR-MCNC: 162 MG/DL — HIGH (ref 70–99)
GLUCOSE BLDC GLUCOMTR-MCNC: 167 MG/DL — HIGH (ref 70–99)
GLUCOSE BLDC GLUCOMTR-MCNC: 199 MG/DL — HIGH (ref 70–99)
GLUCOSE BLDC GLUCOMTR-MCNC: 207 MG/DL — HIGH (ref 70–99)
GLUCOSE SERPL-MCNC: 191 MG/DL — HIGH (ref 70–99)
MAGNESIUM SERPL-MCNC: 2.3 MG/DL — SIGNIFICANT CHANGE UP (ref 1.6–2.6)
POTASSIUM SERPL-MCNC: 4.4 MMOL/L — SIGNIFICANT CHANGE UP (ref 3.5–5.3)
POTASSIUM SERPL-SCNC: 4.4 MMOL/L — SIGNIFICANT CHANGE UP (ref 3.5–5.3)
SODIUM SERPL-SCNC: 136 MMOL/L — SIGNIFICANT CHANGE UP (ref 135–145)

## 2025-03-02 PROCEDURE — 99233 SBSQ HOSP IP/OBS HIGH 50: CPT | Mod: GC

## 2025-03-02 RX ADMIN — Medication 50 MILLIGRAM(S): at 22:06

## 2025-03-02 RX ADMIN — Medication 1 DOSE(S): at 05:40

## 2025-03-02 RX ADMIN — ISOSORBDIE DINITRATE 20 MILLIGRAM(S): 30 TABLET ORAL at 17:45

## 2025-03-02 RX ADMIN — Medication 81 MILLIGRAM(S): at 12:36

## 2025-03-02 RX ADMIN — Medication 40 MILLIGRAM(S): at 05:40

## 2025-03-02 RX ADMIN — Medication 1 DOSE(S): at 12:37

## 2025-03-02 RX ADMIN — ISOSORBDIE DINITRATE 20 MILLIGRAM(S): 30 TABLET ORAL at 05:40

## 2025-03-02 RX ADMIN — Medication 50 MILLIGRAM(S): at 13:15

## 2025-03-02 RX ADMIN — ISOSORBDIE DINITRATE 20 MILLIGRAM(S): 30 TABLET ORAL at 13:15

## 2025-03-02 RX ADMIN — INSULIN LISPRO 2: 100 INJECTION, SOLUTION INTRAVENOUS; SUBCUTANEOUS at 08:55

## 2025-03-02 RX ADMIN — INSULIN LISPRO 3 UNIT(S): 100 INJECTION, SOLUTION INTRAVENOUS; SUBCUTANEOUS at 13:16

## 2025-03-02 RX ADMIN — INSULIN LISPRO 3 UNIT(S): 100 INJECTION, SOLUTION INTRAVENOUS; SUBCUTANEOUS at 08:56

## 2025-03-02 RX ADMIN — INSULIN GLARGINE-YFGN 18 UNIT(S): 100 INJECTION, SOLUTION SUBCUTANEOUS at 22:05

## 2025-03-02 RX ADMIN — Medication 40 MILLIGRAM(S): at 17:45

## 2025-03-02 RX ADMIN — RIVAROXABAN 15 MILLIGRAM(S): 10 TABLET, FILM COATED ORAL at 17:45

## 2025-03-02 RX ADMIN — TIOTROPIUM BROMIDE INHALATION SPRAY 2 PUFF(S): 3.12 SPRAY, METERED RESPIRATORY (INHALATION) at 15:59

## 2025-03-02 RX ADMIN — ATORVASTATIN CALCIUM 20 MILLIGRAM(S): 80 TABLET, FILM COATED ORAL at 22:06

## 2025-03-02 RX ADMIN — TAMSULOSIN HYDROCHLORIDE 0.4 MILLIGRAM(S): 0.4 CAPSULE ORAL at 22:06

## 2025-03-02 RX ADMIN — Medication 1 SPRAY(S): at 05:40

## 2025-03-02 RX ADMIN — INSULIN LISPRO 4: 100 INJECTION, SOLUTION INTRAVENOUS; SUBCUTANEOUS at 17:43

## 2025-03-02 RX ADMIN — Medication 1 SPRAY(S): at 17:45

## 2025-03-02 RX ADMIN — METOPROLOL SUCCINATE 50 MILLIGRAM(S): 50 TABLET, EXTENDED RELEASE ORAL at 08:55

## 2025-03-02 RX ADMIN — INSULIN LISPRO 2: 100 INJECTION, SOLUTION INTRAVENOUS; SUBCUTANEOUS at 13:16

## 2025-03-02 RX ADMIN — FOLIC ACID 1 MILLIGRAM(S): 1 TABLET ORAL at 12:36

## 2025-03-02 RX ADMIN — BUMETANIDE 2 MILLIGRAM(S): 1 TABLET ORAL at 05:40

## 2025-03-02 RX ADMIN — Medication 50 MILLIGRAM(S): at 05:40

## 2025-03-02 RX ADMIN — INSULIN LISPRO 3 UNIT(S): 100 INJECTION, SOLUTION INTRAVENOUS; SUBCUTANEOUS at 17:44

## 2025-03-02 RX ADMIN — BUMETANIDE 2 MILLIGRAM(S): 1 TABLET ORAL at 13:16

## 2025-03-02 NOTE — PROGRESS NOTE ADULT - SUBJECTIVE AND OBJECTIVE BOX
[facility] Division of Hospital Medicine  Gunner Newton DO  Available via MS Teams    SUBJECTIVE / OVERNIGHT EVENTS:  Feels SOB improving, denies chest pain. I/O neg 1L.     MEDICATIONS  (STANDING):  atorvastatin 20 milliGRAM(s) Oral at bedtime  buMETAnide Injectable 2 milliGRAM(s) IV Push two times a day  dextrose 5%. 1000 milliLiter(s) (50 mL/Hr) IV Continuous <Continuous>  dextrose 5%. 1000 milliLiter(s) (100 mL/Hr) IV Continuous <Continuous>  dextrose 50% Injectable 25 Gram(s) IV Push once  dextrose 50% Injectable 12.5 Gram(s) IV Push once  dextrose 50% Injectable 25 Gram(s) IV Push once  fluticasone propionate/ salmeterol 250-50 MICROgram(s) Diskus 1 Dose(s) Inhalation two times a day  folic acid 1 milliGRAM(s) Oral daily  glucagon  Injectable 1 milliGRAM(s) IntraMuscular once  hydrALAZINE 50 milliGRAM(s) Oral three times a day  insulin glargine Injectable (LANTUS) 18 Unit(s) SubCutaneous at bedtime  insulin lispro (ADMELOG) corrective regimen sliding scale   SubCutaneous three times a day before meals  insulin lispro Injectable (ADMELOG) 3 Unit(s) SubCutaneous three times a day before meals  isosorbide   dinitrate Tablet (ISORDIL) 20 milliGRAM(s) Oral three times a day  metoprolol succinate ER 50 milliGRAM(s) Oral daily  rivaroxaban 15 milliGRAM(s) Oral with dinner  sodium chloride 0.65% Nasal 1 Spray(s) Both Nostrils two times a day  tamsulosin 0.4 milliGRAM(s) Oral at bedtime  tiotropium 2.5 MICROgram(s) Inhaler 2 Puff(s) Inhalation daily    MEDICATIONS  (PRN):  acetaminophen     Tablet .. 650 milliGRAM(s) Oral every 6 hours PRN Temp greater or equal to 38C (100.4F), Mild Pain (1 - 3)  albuterol/ipratropium for Nebulization 3 milliLiter(s) Nebulizer every 6 hours PRN Shortness of Breath and/or Wheezing  dextrose Oral Gel 15 Gram(s) Oral once PRN Blood Glucose LESS THAN 70 milliGRAM(s)/deciliter  dextrose Oral Gel 15 Gram(s) Oral once PRN Blood Glucose LESS THAN 70 milliGRAM(s)/deciliter  melatonin 3 milliGRAM(s) Oral at bedtime PRN Insomnia      I&O's Summary    01 Mar 2025 07:01  -  02 Mar 2025 07:00  --------------------------------------------------------  IN: 650 mL / OUT: 1750 mL / NET: -1100 mL    02 Mar 2025 07:01  -  02 Mar 2025 13:22  --------------------------------------------------------  IN: 100 mL / OUT: 0 mL / NET: 100 mL          PHYSICAL EXAM:  Vital Signs Last 24 Hrs  T(C): 36.6 (02 Mar 2025 11:15), Max: 36.6 (01 Mar 2025 20:30)  T(F): 97.8 (02 Mar 2025 11:15), Max: 97.8 (01 Mar 2025 20:30)  HR: 70 (02 Mar 2025 12:45) (70 - 79)  BP: 105/68 (02 Mar 2025 12:45) (100/57 - 118/71)  BP(mean): --  RR: 18 (02 Mar 2025 11:15) (18 - 18)  SpO2: 100% (02 Mar 2025 11:15) (98% - 100%)    Parameters below as of 02 Mar 2025 11:15  Patient On (Oxygen Delivery Method): room air    CONSTITUTIONAL: NAD  EYES: EOMI, conjunctiva and sclera clear  NECK: Supple  RESPIRATORY: Normal respiratory effort; lungs are clear to auscultation bilaterally  CARDIOVASCULAR: Regular rate and rhythm, no murmur, + pitting BLE edema  ABDOMEN: Nontender to palpation, +distention  PSYCH: A+O to person, place, and time  NEUROLOGY: no FND    LABS:                         8.5    10.30 )-----------( 200      ( 01 Mar 2025 07:11 )             30.0     03-02    136  |  95[L]  |  91[H]  ----------------------------<  191[H]  4.4   |  27  |  2.06[H]    Ca    9.1      02 Mar 2025 06:43  Mg     2.3     03-02        Urinalysis Basic - ( 02 Mar 2025 06:43 )    Color: x / Appearance: x / SG: x / pH: x  Gluc: 191 mg/dL / Ketone: x  / Bili: x / Urobili: x   Blood: x / Protein: x / Nitrite: x   Leuk Esterase: x / RBC: x / WBC x   Sq Epi: x / Non Sq Epi: x / Bacteria: x          Labs and imaging reviewed

## 2025-03-02 NOTE — PROGRESS NOTE ADULT - ASSESSMENT
80 y/o male w/ PMHx CAD s/p CABG, HF (combined HFrEF [EF 25%] and HFpEF [G3DD]) w/ ICD, AFib on Xarelto, severe AS s/p TAVR, COPD on 3-4L home O2, CKD4, HTN, HLD, T2DM, and BPH who presents as a transfer from [facility] for HF evaluation. Ongoing CHF exacerbation now off bumex gtt, new DENISHA on CKD

## 2025-03-02 NOTE — PROGRESS NOTE ADULT - SUBJECTIVE AND OBJECTIVE BOX
[facility] NEPHROLOGY SERVICES, [facility]  NEPHROLOGY AND HYPERTENSION  [address]  [address]  [place] [address]  [phone]    MD DANIEL GARRETT MD YELENA ROSENBERG, MD BINNY KOSHY, MD CHRISTOPHER CAPUTO, MD EDWARD BOVER, MD          Patient events noted  no distress    MEDICATIONS  (STANDING):  aspirin enteric coated 81 milliGRAM(s) Oral daily  atorvastatin 20 milliGRAM(s) Oral at bedtime  buMETAnide Injectable 2 milliGRAM(s) IV Push two times a day  dextrose 5%. 1000 milliLiter(s) (50 mL/Hr) IV Continuous <Continuous>  dextrose 5%. 1000 milliLiter(s) (100 mL/Hr) IV Continuous <Continuous>  dextrose 50% Injectable 25 Gram(s) IV Push once  dextrose 50% Injectable 12.5 Gram(s) IV Push once  dextrose 50% Injectable 25 Gram(s) IV Push once  fluticasone propionate/ salmeterol 250-50 MICROgram(s) Diskus 1 Dose(s) Inhalation two times a day  folic acid 1 milliGRAM(s) Oral daily  glucagon  Injectable 1 milliGRAM(s) IntraMuscular once  hydrALAZINE 50 milliGRAM(s) Oral three times a day  insulin glargine Injectable (LANTUS) 18 Unit(s) SubCutaneous at bedtime  insulin lispro (ADMELOG) corrective regimen sliding scale   SubCutaneous three times a day before meals  insulin lispro Injectable (ADMELOG) 3 Unit(s) SubCutaneous three times a day before meals  isosorbide   dinitrate Tablet (ISORDIL) 20 milliGRAM(s) Oral three times a day  metoprolol succinate ER 50 milliGRAM(s) Oral daily  pantoprazole    Tablet 40 milliGRAM(s) Oral every 12 hours  rivaroxaban 15 milliGRAM(s) Oral with dinner  sodium chloride 0.65% Nasal 1 Spray(s) Both Nostrils two times a day  tamsulosin 0.4 milliGRAM(s) Oral at bedtime  tiotropium 2.5 MICROgram(s) Inhaler 2 Puff(s) Inhalation daily    MEDICATIONS  (PRN):  acetaminophen     Tablet .. 650 milliGRAM(s) Oral every 6 hours PRN Temp greater or equal to 38C (100.4F), Mild Pain (1 - 3)  albuterol/ipratropium for Nebulization 3 milliLiter(s) Nebulizer every 6 hours PRN Shortness of Breath and/or Wheezing  dextrose Oral Gel 15 Gram(s) Oral once PRN Blood Glucose LESS THAN 70 milliGRAM(s)/deciliter  dextrose Oral Gel 15 Gram(s) Oral once PRN Blood Glucose LESS THAN 70 milliGRAM(s)/deciliter  melatonin 3 milliGRAM(s) Oral at bedtime PRN Insomnia      03-01-25 @ 07:01  -  03-02-25 @ 07:00  --------------------------------------------------------  IN: 650 mL / OUT: 1750 mL / NET: -1100 mL    03-02-25 @ 07:01  -  03-02-25 @ 22:37  --------------------------------------------------------  IN: 440 mL / OUT: 650 mL / NET: -210 mL      PHYSICAL EXAM:      T(C): 36.7 (03-02-25 @ 21:06), Max: 36.7 (03-02-25 @ 21:06)  HR: 70 (03-02-25 @ 21:06) (70 - 76)  BP: 102/53 (03-02-25 @ 21:06) (100/57 - 116/65)  RR: 18 (03-02-25 @ 21:06) (18 - 18)  SpO2: 96% (03-02-25 @ 21:06) (86% - 100%)  Wt(kg): --  Lungs clear  Heart S1S2  Abd soft NT ND  Extremities:   2  edema                                    8.5    10.30 )-----------( 200      ( 01 Mar 2025 07:11 )             30.0     03-02    136  |  95[L]  |  91[H]  ----------------------------<  191[H]  4.4   |  27  |  2.06[H]    Ca    9.1      02 Mar 2025 06:43  Mg     2.3     03-02          Creatinine Trend: 2.06<--, 2.06<--, 2.17<--, 2.26<--, 2.28<--, 2.47<--        A/P:    CM, EF ~ 30%, severe TR  Adm to [facility] VS 1/10 w/fluid overload   Tx to [facility] 2/5 for further management   Aggressive diuresis w/improvement   Known CKD 3, stable (baseline Cr ~ 2. - 2.5)  Continue diuresis per HF team  Good UO  F/u BMP, Mg  Avoid nephrotoxins as possible     Jon Rowe MD no

## 2025-03-03 LAB
ANION GAP SERPL CALC-SCNC: 14 MMOL/L — SIGNIFICANT CHANGE UP (ref 5–17)
BUN SERPL-MCNC: 94 MG/DL — HIGH (ref 7–23)
CALCIUM SERPL-MCNC: 9.1 MG/DL — SIGNIFICANT CHANGE UP (ref 8.4–10.5)
CHLORIDE SERPL-SCNC: 92 MMOL/L — LOW (ref 96–108)
CO2 SERPL-SCNC: 23 MMOL/L — SIGNIFICANT CHANGE UP (ref 22–31)
CREAT SERPL-MCNC: 2.61 MG/DL — HIGH (ref 0.5–1.3)
EGFR: 24 ML/MIN/1.73M2 — LOW
EGFR: 24 ML/MIN/1.73M2 — LOW
GLUCOSE BLDC GLUCOMTR-MCNC: 141 MG/DL — HIGH (ref 70–99)
GLUCOSE BLDC GLUCOMTR-MCNC: 165 MG/DL — HIGH (ref 70–99)
GLUCOSE BLDC GLUCOMTR-MCNC: 172 MG/DL — HIGH (ref 70–99)
GLUCOSE BLDC GLUCOMTR-MCNC: 90 MG/DL — SIGNIFICANT CHANGE UP (ref 70–99)
GLUCOSE SERPL-MCNC: 119 MG/DL — HIGH (ref 70–99)
HCT VFR BLD CALC: 29.3 % — LOW (ref 39–50)
HGB BLD-MCNC: 8.2 G/DL — LOW (ref 13–17)
MCHC RBC-ENTMCNC: 25.4 PG — LOW (ref 27–34)
MCHC RBC-ENTMCNC: 28 G/DL — LOW (ref 32–36)
MCV RBC AUTO: 90.7 FL — SIGNIFICANT CHANGE UP (ref 80–100)
NRBC BLD AUTO-RTO: 0 /100 WBCS — SIGNIFICANT CHANGE UP (ref 0–0)
PLATELET # BLD AUTO: 198 K/UL — SIGNIFICANT CHANGE UP (ref 150–400)
POTASSIUM SERPL-MCNC: 4.4 MMOL/L — SIGNIFICANT CHANGE UP (ref 3.5–5.3)
POTASSIUM SERPL-SCNC: 4.4 MMOL/L — SIGNIFICANT CHANGE UP (ref 3.5–5.3)
RBC # BLD: 3.23 M/UL — LOW (ref 4.2–5.8)
RBC # FLD: 22.1 % — HIGH (ref 10.3–14.5)
SODIUM SERPL-SCNC: 129 MMOL/L — LOW (ref 135–145)
WBC # BLD: 11.32 K/UL — HIGH (ref 3.8–10.5)
WBC # FLD AUTO: 11.32 K/UL — HIGH (ref 3.8–10.5)

## 2025-03-03 PROCEDURE — 99233 SBSQ HOSP IP/OBS HIGH 50: CPT

## 2025-03-03 RX ORDER — EPOETIN ALFA 10000 [IU]/ML
10000 SOLUTION INTRAVENOUS; SUBCUTANEOUS
Refills: 0 | Status: DISCONTINUED | OUTPATIENT
Start: 2025-03-03 | End: 2025-03-25

## 2025-03-03 RX ORDER — PSYLLIUM SEED (WITH DEXTROSE)
1 POWDER (GRAM) ORAL DAILY
Refills: 0 | Status: DISCONTINUED | OUTPATIENT
Start: 2025-03-03 | End: 2025-04-14

## 2025-03-03 RX ORDER — BUMETANIDE 1 MG/1
2 TABLET ORAL
Refills: 0 | Status: DISCONTINUED | OUTPATIENT
Start: 2025-03-03 | End: 2025-03-04

## 2025-03-03 RX ADMIN — INSULIN LISPRO 3 UNIT(S): 100 INJECTION, SOLUTION INTRAVENOUS; SUBCUTANEOUS at 17:41

## 2025-03-03 RX ADMIN — Medication 1 SPRAY(S): at 17:48

## 2025-03-03 RX ADMIN — Medication 1 DOSE(S): at 05:39

## 2025-03-03 RX ADMIN — Medication 50 MILLIGRAM(S): at 05:39

## 2025-03-03 RX ADMIN — Medication 1 PACKET(S): at 17:48

## 2025-03-03 RX ADMIN — FOLIC ACID 1 MILLIGRAM(S): 1 TABLET ORAL at 13:18

## 2025-03-03 RX ADMIN — Medication 40 MILLIGRAM(S): at 05:39

## 2025-03-03 RX ADMIN — INSULIN LISPRO 2: 100 INJECTION, SOLUTION INTRAVENOUS; SUBCUTANEOUS at 13:20

## 2025-03-03 RX ADMIN — RIVAROXABAN 15 MILLIGRAM(S): 10 TABLET, FILM COATED ORAL at 17:48

## 2025-03-03 RX ADMIN — INSULIN LISPRO 3 UNIT(S): 100 INJECTION, SOLUTION INTRAVENOUS; SUBCUTANEOUS at 13:21

## 2025-03-03 RX ADMIN — BUMETANIDE 2 MILLIGRAM(S): 1 TABLET ORAL at 05:38

## 2025-03-03 RX ADMIN — INSULIN LISPRO 3 UNIT(S): 100 INJECTION, SOLUTION INTRAVENOUS; SUBCUTANEOUS at 08:47

## 2025-03-03 RX ADMIN — TAMSULOSIN HYDROCHLORIDE 0.4 MILLIGRAM(S): 0.4 CAPSULE ORAL at 21:56

## 2025-03-03 RX ADMIN — BUMETANIDE 2 MILLIGRAM(S): 1 TABLET ORAL at 13:11

## 2025-03-03 RX ADMIN — INSULIN GLARGINE-YFGN 18 UNIT(S): 100 INJECTION, SOLUTION SUBCUTANEOUS at 21:56

## 2025-03-03 RX ADMIN — TIOTROPIUM BROMIDE INHALATION SPRAY 2 PUFF(S): 3.12 SPRAY, METERED RESPIRATORY (INHALATION) at 13:19

## 2025-03-03 RX ADMIN — Medication 1 SPRAY(S): at 05:39

## 2025-03-03 RX ADMIN — ISOSORBDIE DINITRATE 20 MILLIGRAM(S): 30 TABLET ORAL at 05:42

## 2025-03-03 RX ADMIN — Medication 50 MILLIGRAM(S): at 13:15

## 2025-03-03 RX ADMIN — Medication 40 MILLIGRAM(S): at 17:48

## 2025-03-03 RX ADMIN — Medication 81 MILLIGRAM(S): at 13:15

## 2025-03-03 RX ADMIN — Medication 1 DOSE(S): at 17:47

## 2025-03-03 RX ADMIN — ATORVASTATIN CALCIUM 20 MILLIGRAM(S): 80 TABLET, FILM COATED ORAL at 21:56

## 2025-03-03 RX ADMIN — ISOSORBDIE DINITRATE 20 MILLIGRAM(S): 30 TABLET ORAL at 17:47

## 2025-03-03 RX ADMIN — ISOSORBDIE DINITRATE 20 MILLIGRAM(S): 30 TABLET ORAL at 13:14

## 2025-03-03 RX ADMIN — Medication 50 MILLIGRAM(S): at 21:56

## 2025-03-03 RX ADMIN — METOPROLOL SUCCINATE 50 MILLIGRAM(S): 50 TABLET, EXTENDED RELEASE ORAL at 08:47

## 2025-03-03 RX ADMIN — EPOETIN ALFA 10000 UNIT(S): 10000 SOLUTION INTRAVENOUS; SUBCUTANEOUS at 18:20

## 2025-03-03 NOTE — PROGRESS NOTE ADULT - SUBJECTIVE AND OBJECTIVE BOX
No distress, on NC O2    Vital Signs Last 24 Hrs  T(C): 36.4 (03-03-25 @ 11:31), Max: 36.7 (03-02-25 @ 21:06)  T(F): 97.5 (03-03-25 @ 11:31), Max: 98.1 (03-02-25 @ 21:06)  HR: 71 (03-03-25 @ 11:31) (70 - 77)  BP: 108/65 (03-03-25 @ 11:31) (102/53 - 108/65)  RR: 18 (03-03-25 @ 11:31) (18 - 18)  SpO2: 100% (03-03-25 @ 11:31) (86% - 100%)    I&O's Detail    02 Mar 2025 07:01  -  03 Mar 2025 07:00  --------------------------------------------------------  IN:    Oral Fluid: 440 mL  Total IN: 440 mL    OUT:    Voided (mL): 850 mL  Total OUT: 850 mL    s1s2  b/l air entry  soft, ND  edema                                                               8.2    11.32 )-----------( 198      ( 03 Mar 2025 06:36 )             29.3     03 Mar 2025 06:36    129    |  92     |  94     ----------------------------<  119    4.4     |  23     |  2.61     Ca    9.1        03 Mar 2025 06:36  Mg     2.3       02 Mar 2025 06:43    acetaminophen     Tablet .. 650 milliGRAM(s) Oral every 6 hours PRN  albuterol/ipratropium for Nebulization 3 milliLiter(s) Nebulizer every 6 hours PRN  aspirin enteric coated 81 milliGRAM(s) Oral daily  atorvastatin 20 milliGRAM(s) Oral at bedtime  buMETAnide Injectable 2 milliGRAM(s) IV Push two times a day  dextrose 5%. 1000 milliLiter(s) IV Continuous <Continuous>  dextrose 5%. 1000 milliLiter(s) IV Continuous <Continuous>  dextrose 50% Injectable 25 Gram(s) IV Push once  dextrose 50% Injectable 25 Gram(s) IV Push once  dextrose 50% Injectable 12.5 Gram(s) IV Push once  dextrose Oral Gel 15 Gram(s) Oral once PRN  dextrose Oral Gel 15 Gram(s) Oral once PRN  fluticasone propionate/ salmeterol 250-50 MICROgram(s) Diskus 1 Dose(s) Inhalation two times a day  folic acid 1 milliGRAM(s) Oral daily  glucagon  Injectable 1 milliGRAM(s) IntraMuscular once  hydrALAZINE 50 milliGRAM(s) Oral three times a day  insulin glargine Injectable (LANTUS) 18 Unit(s) SubCutaneous at bedtime  insulin lispro (ADMELOG) corrective regimen sliding scale   SubCutaneous three times a day before meals  insulin lispro Injectable (ADMELOG) 3 Unit(s) SubCutaneous three times a day before meals  isosorbide   dinitrate Tablet (ISORDIL) 20 milliGRAM(s) Oral three times a day  melatonin 3 milliGRAM(s) Oral at bedtime PRN  metoprolol succinate ER 50 milliGRAM(s) Oral daily  pantoprazole    Tablet 40 milliGRAM(s) Oral every 12 hours  psyllium Powder 1 Packet(s) Oral daily  rivaroxaban 15 milliGRAM(s) Oral with dinner  sodium chloride 0.65% Nasal 1 Spray(s) Both Nostrils two times a day  tamsulosin 0.4 milliGRAM(s) Oral at bedtime  tiotropium 2.5 MICROgram(s) Inhaler 2 Puff(s) Inhalation daily    A/P:    CM, EF ~ 30%, severe TR  Adm to [facility] VS 1/10 w/fluid overload   Tx to [facility] 2/5 for further management   Known CKD 3, (baseline Cr ~ 2. - 2.5)  Hemodynamic/cardio-renal fluctuations in Cr  Diuresis per HF team  F/u BMP, Mg, UO  Mild hypervolemic hyponatremia   Avoid nephrotoxins as possible   FR as ordered     [phone]

## 2025-03-03 NOTE — PROGRESS NOTE ADULT - ASSESSMENT
80 y/o male w/ PMHx CAD s/p CABG, HF (combined HFrEF [EF 25%] and HFpEF [G3DD]) w/ ICD, AFib on Xarelto, severe AS s/p TAVR, COPD on 3-4L home O2, CKD4, HTN, HLD, T2DM, and BPH who presents as a transfer from [facility] for HF evaluation. Ongoing CHF exacerbation now off bumex gtt, new DENISHA on CKD

## 2025-03-03 NOTE — PROGRESS NOTE ADULT - SUBJECTIVE AND OBJECTIVE BOX
ADVANCED HEART FAILURE & TRANSPLANT  - PROGRESS NOTE  *To reach the NS2 Team from 8am to 5pm (MON-FRI), please call [phone].   _______________________________________________________________________________________________________    Subjective:  - Seen laying flat in bed  -     Medications:  acetaminophen     Tablet .. 650 milliGRAM(s) Oral every 6 hours PRN  albuterol/ipratropium for Nebulization 3 milliLiter(s) Nebulizer every 6 hours PRN  aspirin enteric coated 81 milliGRAM(s) Oral daily  atorvastatin 20 milliGRAM(s) Oral at bedtime  buMETAnide 2 milliGRAM(s) Oral two times a day  dextrose 5%. 1000 milliLiter(s) IV Continuous <Continuous>  dextrose 5%. 1000 milliLiter(s) IV Continuous <Continuous>  dextrose 50% Injectable 25 Gram(s) IV Push once  dextrose 50% Injectable 12.5 Gram(s) IV Push once  dextrose 50% Injectable 25 Gram(s) IV Push once  dextrose Oral Gel 15 Gram(s) Oral once PRN  dextrose Oral Gel 15 Gram(s) Oral once PRN  epoetin lashawn (EPOGEN) Injectable 90616 Unit(s) SubCutaneous every 7 days  fluticasone propionate/ salmeterol 250-50 MICROgram(s) Diskus 1 Dose(s) Inhalation two times a day  folic acid 1 milliGRAM(s) Oral daily  glucagon  Injectable 1 milliGRAM(s) IntraMuscular once  hydrALAZINE 50 milliGRAM(s) Oral three times a day  insulin glargine Injectable (LANTUS) 18 Unit(s) SubCutaneous at bedtime  insulin lispro (ADMELOG) corrective regimen sliding scale   SubCutaneous three times a day before meals  insulin lispro Injectable (ADMELOG) 3 Unit(s) SubCutaneous three times a day before meals  isosorbide   dinitrate Tablet (ISORDIL) 20 milliGRAM(s) Oral three times a day  melatonin 3 milliGRAM(s) Oral at bedtime PRN  metoprolol succinate ER 50 milliGRAM(s) Oral daily  pantoprazole    Tablet 40 milliGRAM(s) Oral every 12 hours  psyllium Powder 1 Packet(s) Oral daily  rivaroxaban 15 milliGRAM(s) Oral with dinner  sodium chloride 0.65% Nasal 1 Spray(s) Both Nostrils two times a day  tamsulosin 0.4 milliGRAM(s) Oral at bedtime  tiotropium 2.5 MICROgram(s) Inhaler 2 Puff(s) Inhalation daily      Physical Exam:    Vitals:  Vital Signs Last 24 Hours  T(C): 36.4 (25 @ 11:31), Max: 36.7 (25 @ 21:06)  HR: 71 (25 @ 11:31) (70 - 77)  BP: 108/65 (25 @ 11:31) (102/53 - 108/65)  RR: 18 (25 @ 11:31) (18 - 18)  SpO2: 100% (25 @ 11:31) (94% - 100%)    Weight in k.9 ( @ 04:50)    I&O's Summary    02 Mar 2025 07:01  -  03 Mar 2025 07:00  --------------------------------------------------------  IN: 440 mL / OUT: 850 mL / NET: -410 mL      General: No distress. Comfortable.  HEENT: EOM intact.  Neck: Neck supple. JVP not elevated. No masses  Chest: Clear to auscultation bilaterally  CV: Normal S1 and S2. No murmurs, rub, or gallops. Radial pulses normal, warm peripherally   Abdomen: Moderately distended  Skin: No rashes or skin breakdown  Extremities: +1-2 BLE  Neurology: Alert and oriented times three. Sensation intact  Psych: Affect normal    Labs:                        8.2    11.32 )-----------( 198      ( 03 Mar 2025 06:36 )             29.3         129[L]  |  92[L]  |  94[H]  ----------------------------<  119[H]  4.4   |  23  |  2.61[H]    Ca    9.1      03 Mar 2025 06:36  Mg     2.3

## 2025-03-03 NOTE — PROGRESS NOTE ADULT - ASSESSMENT
80 y/o male w/ PMHx CAD s/p CABG, HFrEF (LVIDd 5.2, LVEF 25-30%) w/ DC- ICD, AFib (Xarelto), severe AS s/p TAVR, COPD on 3-4L home O2, CKD3, HTN, HLD, T2DM, and BPH presented to [facility] for ADHF, despite escalation of home diuretics. Now transferred to [facility] for refractory volume overload. His labs notable for stable Scr (though unclear baseline).  Repeat TTE shows severe BiV dysfunction. s/p RRT for hypotension given 500cc bolus on 2/11    GOC clarified and he remains DNR/DNI per wife - wants the ICD on. He has diuresed with IVP Bumex bur remains volume expanded.     Cardiac Studies  TTE 2/7/25 LVIDd 5.3cm, LVEF 30%, no LV thrombus, TAPSE 1.0cm, severely enlarged RV size/RVSF reduced, mod dilated LA, severe dilated RA, TAVR present, mod MR, severe TR, PASP 52, PASP 52, no pericardial effusion, IVC dilated 2.35cm  TTE 8/8/24; LVIDd 5.2, EF 25-30%, global hypokinesis, severe grade III DD, TASPE 1.1, TAVR present in AV position, mld-mod MR, mod TR, PASP 53

## 2025-03-03 NOTE — PROGRESS NOTE ADULT - PROBLEM SELECTOR PLAN 1
Etiology likely ischemic. Primary cardiologist Dr. Jewel Stubbs ([facility]), last TTE LVEF 25-30%. Appears has been on some GDMT as outpt (?Farxiga, Coreg and previously on ARNI though unclear why stopped)  - Toprol XL 50mg QD  - Continue hydral to 50mg TID and isordil 20mg TID; Hold for SBP <90  - hold entresto and dino for DENISHA  - STOP bumex IVP today and Start Bumex 3mg PO BID this evening; Please give metolazone 5mg PO today. Will plan to discharge with Furosix.   - Has ICD in place; Currently a DNR/DNI: trial NIV. Discussed with palliative and at this time wants ICD on as per wife  - Aggressive PT  - standing weights documented does not appear accurate  - Strict I/O's  - Target electrolyte repletion to target K+ >4.0 and Mg >2.0.  - close HF follow up  - likely not a good candidate for MEMs

## 2025-03-03 NOTE — PROGRESS NOTE ADULT - PROBLEM SELECTOR PLAN 1
EF 30% with severe tricuspid regurg and PASP 55mmHG on 2/7/24  - Diuretic: IV bumex 2 mg BID with HTS daily. S/p IV diuril.   - GDMT:  Metoprolol succinate 50mg day. Holding entresto and aldactone for DENISHA. Not on SGLT2.  - Hydralazine/Nitrate: hydral 50mg TID. Isordil 20mg TID   - daily standing weights.  dry wt is 243 lbs.   - Cardiology following, patient declined RHC and cardiomems

## 2025-03-03 NOTE — PROGRESS NOTE ADULT - SUBJECTIVE AND OBJECTIVE BOX
[facility] Division of Hospital Medicine  Gunner Newton DO  Available via MS Teams    SUBJECTIVE / OVERNIGHT EVENTS:  Sitting up in chair on exam. Complains of multiple loss BM overnight and this morning. Discussed with nurse, stool is soft but not liquid/diarrhea.     MEDICATIONS  (STANDING):  aspirin enteric coated 81 milliGRAM(s) Oral daily  atorvastatin 20 milliGRAM(s) Oral at bedtime  buMETAnide Injectable 2 milliGRAM(s) IV Push two times a day  dextrose 5%. 1000 milliLiter(s) (50 mL/Hr) IV Continuous <Continuous>  dextrose 5%. 1000 milliLiter(s) (100 mL/Hr) IV Continuous <Continuous>  dextrose 50% Injectable 25 Gram(s) IV Push once  dextrose 50% Injectable 12.5 Gram(s) IV Push once  dextrose 50% Injectable 25 Gram(s) IV Push once  fluticasone propionate/ salmeterol 250-50 MICROgram(s) Diskus 1 Dose(s) Inhalation two times a day  folic acid 1 milliGRAM(s) Oral daily  glucagon  Injectable 1 milliGRAM(s) IntraMuscular once  hydrALAZINE 50 milliGRAM(s) Oral three times a day  insulin glargine Injectable (LANTUS) 18 Unit(s) SubCutaneous at bedtime  insulin lispro (ADMELOG) corrective regimen sliding scale   SubCutaneous three times a day before meals  insulin lispro Injectable (ADMELOG) 3 Unit(s) SubCutaneous three times a day before meals  isosorbide   dinitrate Tablet (ISORDIL) 20 milliGRAM(s) Oral three times a day  metoprolol succinate ER 50 milliGRAM(s) Oral daily  pantoprazole    Tablet 40 milliGRAM(s) Oral every 12 hours  psyllium Powder 1 Packet(s) Oral daily  rivaroxaban 15 milliGRAM(s) Oral with dinner  sodium chloride 0.65% Nasal 1 Spray(s) Both Nostrils two times a day  tamsulosin 0.4 milliGRAM(s) Oral at bedtime  tiotropium 2.5 MICROgram(s) Inhaler 2 Puff(s) Inhalation daily    MEDICATIONS  (PRN):  acetaminophen     Tablet .. 650 milliGRAM(s) Oral every 6 hours PRN Temp greater or equal to 38C (100.4F), Mild Pain (1 - 3)  albuterol/ipratropium for Nebulization 3 milliLiter(s) Nebulizer every 6 hours PRN Shortness of Breath and/or Wheezing  dextrose Oral Gel 15 Gram(s) Oral once PRN Blood Glucose LESS THAN 70 milliGRAM(s)/deciliter  dextrose Oral Gel 15 Gram(s) Oral once PRN Blood Glucose LESS THAN 70 milliGRAM(s)/deciliter  melatonin 3 milliGRAM(s) Oral at bedtime PRN Insomnia      I&O's Summary    02 Mar 2025 07:01  -  03 Mar 2025 07:00  --------------------------------------------------------  IN: 440 mL / OUT: 850 mL / NET: -410 mL        PHYSICAL EXAM:  Vital Signs Last 24 Hrs  T(C): 36.4 (03 Mar 2025 11:31), Max: 36.7 (02 Mar 2025 21:06)  T(F): 97.5 (03 Mar 2025 11:31), Max: 98.1 (02 Mar 2025 21:06)  HR: 71 (03 Mar 2025 11:31) (70 - 77)  BP: 108/65 (03 Mar 2025 11:31) (102/53 - 116/65)  BP(mean): --  RR: 18 (03 Mar 2025 11:31) (18 - 18)  SpO2: 100% (03 Mar 2025 11:31) (86% - 100%)    Parameters below as of 03 Mar 2025 11:31  Patient On (Oxygen Delivery Method): nasal cannula  O2 Flow (L/min): 4    CONSTITUTIONAL: NAD  EYES: EOMI, conjunctiva and sclera clear  NECK: Supple  RESPIRATORY: Normal respiratory effort; lungs are clear to auscultation bilaterally  CARDIOVASCULAR: Regular rate and rhythm, no murmur, + pitting BLE edema  ABDOMEN: Nontender to palpation, +distention  PSYCH: A+O to person, place, and time  NEUROLOGY: no FND    LABS:                         8.2    11.32 )-----------( 198      ( 03 Mar 2025 06:36 )             29.3     03-03    129[L]  |  92[L]  |  94[H]  ----------------------------<  119[H]  4.4   |  23  |  2.61[H]    Ca    9.1      03 Mar 2025 06:36  Mg     2.3     03-02        Urinalysis Basic - ( 03 Mar 2025 06:36 )    Color: x / Appearance: x / SG: x / pH: x  Gluc: 119 mg/dL / Ketone: x  / Bili: x / Urobili: x   Blood: x / Protein: x / Nitrite: x   Leuk Esterase: x / RBC: x / WBC x   Sq Epi: x / Non Sq Epi: x / Bacteria: x        Labs reviewed

## 2025-03-04 LAB
ANION GAP SERPL CALC-SCNC: 14 MMOL/L — SIGNIFICANT CHANGE UP (ref 5–17)
BUN SERPL-MCNC: 97 MG/DL — HIGH (ref 7–23)
CALCIUM SERPL-MCNC: 9.3 MG/DL — SIGNIFICANT CHANGE UP (ref 8.4–10.5)
CHLORIDE SERPL-SCNC: 92 MMOL/L — LOW (ref 96–108)
CO2 SERPL-SCNC: 28 MMOL/L — SIGNIFICANT CHANGE UP (ref 22–31)
CREAT SERPL-MCNC: 2.68 MG/DL — HIGH (ref 0.5–1.3)
EGFR: 23 ML/MIN/1.73M2 — LOW
EGFR: 23 ML/MIN/1.73M2 — LOW
GLUCOSE BLDC GLUCOMTR-MCNC: 125 MG/DL — HIGH (ref 70–99)
GLUCOSE BLDC GLUCOMTR-MCNC: 135 MG/DL — HIGH (ref 70–99)
GLUCOSE BLDC GLUCOMTR-MCNC: 155 MG/DL — HIGH (ref 70–99)
GLUCOSE BLDC GLUCOMTR-MCNC: 162 MG/DL — HIGH (ref 70–99)
GLUCOSE BLDC GLUCOMTR-MCNC: 175 MG/DL — HIGH (ref 70–99)
GLUCOSE SERPL-MCNC: 91 MG/DL — SIGNIFICANT CHANGE UP (ref 70–99)
HCT VFR BLD CALC: 28.5 % — LOW (ref 39–50)
HGB BLD-MCNC: 8 G/DL — LOW (ref 13–17)
MAGNESIUM SERPL-MCNC: 2.4 MG/DL — SIGNIFICANT CHANGE UP (ref 1.6–2.6)
MCHC RBC-ENTMCNC: 25 PG — LOW (ref 27–34)
MCHC RBC-ENTMCNC: 28.1 G/DL — LOW (ref 32–36)
MCV RBC AUTO: 89.1 FL — SIGNIFICANT CHANGE UP (ref 80–100)
NRBC BLD AUTO-RTO: 0 /100 WBCS — SIGNIFICANT CHANGE UP (ref 0–0)
PLATELET # BLD AUTO: 207 K/UL — SIGNIFICANT CHANGE UP (ref 150–400)
POTASSIUM SERPL-MCNC: 4.2 MMOL/L — SIGNIFICANT CHANGE UP (ref 3.5–5.3)
POTASSIUM SERPL-SCNC: 4.2 MMOL/L — SIGNIFICANT CHANGE UP (ref 3.5–5.3)
RBC # BLD: 3.2 M/UL — LOW (ref 4.2–5.8)
RBC # FLD: 21.4 % — HIGH (ref 10.3–14.5)
SODIUM SERPL-SCNC: 134 MMOL/L — LOW (ref 135–145)
URATE SERPL-MCNC: 14.2 MG/DL — HIGH (ref 3.4–8.8)
WBC # BLD: 10.51 K/UL — HIGH (ref 3.8–10.5)
WBC # FLD AUTO: 10.51 K/UL — HIGH (ref 3.8–10.5)

## 2025-03-04 PROCEDURE — 99233 SBSQ HOSP IP/OBS HIGH 50: CPT

## 2025-03-04 PROCEDURE — 76705 ECHO EXAM OF ABDOMEN: CPT | Mod: 26

## 2025-03-04 RX ORDER — BUMETANIDE 1 MG/1
4 TABLET ORAL
Refills: 0 | Status: DISCONTINUED | OUTPATIENT
Start: 2025-03-04 | End: 2025-03-05

## 2025-03-04 RX ORDER — BUMETANIDE 1 MG/1
2 TABLET ORAL ONCE
Refills: 0 | Status: DISCONTINUED | OUTPATIENT
Start: 2025-03-04 | End: 2025-03-04

## 2025-03-04 RX ORDER — BUMETANIDE 1 MG/1
3 TABLET ORAL
Refills: 0 | Status: DISCONTINUED | OUTPATIENT
Start: 2025-03-04 | End: 2025-03-04

## 2025-03-04 RX ORDER — BUMETANIDE 1 MG/1
3 TABLET ORAL ONCE
Refills: 0 | Status: DISCONTINUED | OUTPATIENT
Start: 2025-03-04 | End: 2025-03-04

## 2025-03-04 RX ORDER — BUMETANIDE 1 MG/1
4 TABLET ORAL ONCE
Refills: 0 | Status: COMPLETED | OUTPATIENT
Start: 2025-03-04 | End: 2025-03-04

## 2025-03-04 RX ADMIN — INSULIN LISPRO 3 UNIT(S): 100 INJECTION, SOLUTION INTRAVENOUS; SUBCUTANEOUS at 08:47

## 2025-03-04 RX ADMIN — ISOSORBDIE DINITRATE 20 MILLIGRAM(S): 30 TABLET ORAL at 17:30

## 2025-03-04 RX ADMIN — Medication 50 MILLIGRAM(S): at 05:36

## 2025-03-04 RX ADMIN — Medication 50 MILLIGRAM(S): at 13:30

## 2025-03-04 RX ADMIN — Medication 81 MILLIGRAM(S): at 13:29

## 2025-03-04 RX ADMIN — IPRATROPIUM BROMIDE AND ALBUTEROL SULFATE 3 MILLILITER(S): .5; 2.5 SOLUTION RESPIRATORY (INHALATION) at 08:14

## 2025-03-04 RX ADMIN — FOLIC ACID 1 MILLIGRAM(S): 1 TABLET ORAL at 13:28

## 2025-03-04 RX ADMIN — Medication 1 DOSE(S): at 17:34

## 2025-03-04 RX ADMIN — ISOSORBDIE DINITRATE 20 MILLIGRAM(S): 30 TABLET ORAL at 05:37

## 2025-03-04 RX ADMIN — Medication 40 MILLIGRAM(S): at 17:31

## 2025-03-04 RX ADMIN — BUMETANIDE 2 MILLIGRAM(S): 1 TABLET ORAL at 05:37

## 2025-03-04 RX ADMIN — Medication 1 DOSE(S): at 05:35

## 2025-03-04 RX ADMIN — INSULIN GLARGINE-YFGN 18 UNIT(S): 100 INJECTION, SOLUTION SUBCUTANEOUS at 21:32

## 2025-03-04 RX ADMIN — ATORVASTATIN CALCIUM 20 MILLIGRAM(S): 80 TABLET, FILM COATED ORAL at 21:32

## 2025-03-04 RX ADMIN — ISOSORBDIE DINITRATE 20 MILLIGRAM(S): 30 TABLET ORAL at 10:59

## 2025-03-04 RX ADMIN — Medication 1 PACKET(S): at 13:28

## 2025-03-04 RX ADMIN — INSULIN LISPRO 3 UNIT(S): 100 INJECTION, SOLUTION INTRAVENOUS; SUBCUTANEOUS at 17:33

## 2025-03-04 RX ADMIN — BUMETANIDE 4 MILLIGRAM(S): 1 TABLET ORAL at 17:30

## 2025-03-04 RX ADMIN — Medication 40 MILLIGRAM(S): at 05:37

## 2025-03-04 RX ADMIN — BUMETANIDE 132 MILLIGRAM(S): 1 TABLET ORAL at 13:26

## 2025-03-04 RX ADMIN — INSULIN LISPRO 2: 100 INJECTION, SOLUTION INTRAVENOUS; SUBCUTANEOUS at 17:32

## 2025-03-04 RX ADMIN — Medication 1 SPRAY(S): at 17:32

## 2025-03-04 RX ADMIN — TIOTROPIUM BROMIDE INHALATION SPRAY 2 PUFF(S): 3.12 SPRAY, METERED RESPIRATORY (INHALATION) at 13:28

## 2025-03-04 RX ADMIN — Medication 100 MILLIGRAM(S): at 13:35

## 2025-03-04 RX ADMIN — RIVAROXABAN 15 MILLIGRAM(S): 10 TABLET, FILM COATED ORAL at 17:31

## 2025-03-04 RX ADMIN — TAMSULOSIN HYDROCHLORIDE 0.4 MILLIGRAM(S): 0.4 CAPSULE ORAL at 21:32

## 2025-03-04 RX ADMIN — METOPROLOL SUCCINATE 50 MILLIGRAM(S): 50 TABLET, EXTENDED RELEASE ORAL at 08:48

## 2025-03-04 RX ADMIN — Medication 1 SPRAY(S): at 05:35

## 2025-03-04 RX ADMIN — INSULIN LISPRO 3 UNIT(S): 100 INJECTION, SOLUTION INTRAVENOUS; SUBCUTANEOUS at 13:29

## 2025-03-04 NOTE — PROGRESS NOTE ADULT - ASSESSMENT
80 y/o male w/ PMHx CAD s/p CABG, HF (combined HFrEF [EF 25%] and HFpEF [G3DD]) w/ ICD, AFib on Xarelto, severe AS s/p TAVR, COPD on 3-4L home O2, CKD4, HTN, HLD, T2DM, and BPH who presents as a transfer from [facility] for HF evaluation. Ongoing CHF exacerbation now off bumex gtt, new DENISHA on CKD

## 2025-03-04 NOTE — PROGRESS NOTE ADULT - SUBJECTIVE AND OBJECTIVE BOX
ADVANCED HEART FAILURE & TRANSPLANT  - PROGRESS NOTE  *To reach the NS2 Team from 8am to 5pm (MON-FRI), please call [phone].   _______________________________________________________________________________________________________    Subjective:    Medications:  acetaminophen     Tablet .. 650 milliGRAM(s) Oral every 6 hours PRN  albuterol/ipratropium for Nebulization 3 milliLiter(s) Nebulizer every 6 hours PRN  aspirin enteric coated 81 milliGRAM(s) Oral daily  atorvastatin 20 milliGRAM(s) Oral at bedtime  buMETAnide 3 milliGRAM(s) Oral two times a day  dextrose 5%. 1000 milliLiter(s) IV Continuous <Continuous>  dextrose 5%. 1000 milliLiter(s) IV Continuous <Continuous>  dextrose 50% Injectable 25 Gram(s) IV Push once  dextrose 50% Injectable 12.5 Gram(s) IV Push once  dextrose 50% Injectable 25 Gram(s) IV Push once  dextrose Oral Gel 15 Gram(s) Oral once PRN  dextrose Oral Gel 15 Gram(s) Oral once PRN  epoetin lashawn (EPOGEN) Injectable 33218 Unit(s) SubCutaneous every 7 days  fluticasone propionate/ salmeterol 250-50 MICROgram(s) Diskus 1 Dose(s) Inhalation two times a day  folic acid 1 milliGRAM(s) Oral daily  glucagon  Injectable 1 milliGRAM(s) IntraMuscular once  hydrALAZINE 50 milliGRAM(s) Oral three times a day  insulin glargine Injectable (LANTUS) 18 Unit(s) SubCutaneous at bedtime  insulin lispro (ADMELOG) corrective regimen sliding scale   SubCutaneous three times a day before meals  insulin lispro Injectable (ADMELOG) 3 Unit(s) SubCutaneous three times a day before meals  isosorbide   dinitrate Tablet (ISORDIL) 20 milliGRAM(s) Oral three times a day  melatonin 3 milliGRAM(s) Oral at bedtime PRN  metolazone 5 milliGRAM(s) Oral daily  metoprolol succinate ER 50 milliGRAM(s) Oral daily  pantoprazole    Tablet 40 milliGRAM(s) Oral every 12 hours  psyllium Powder 1 Packet(s) Oral daily  rivaroxaban 15 milliGRAM(s) Oral with dinner  sodium chloride 0.65% Nasal 1 Spray(s) Both Nostrils two times a day  tamsulosin 0.4 milliGRAM(s) Oral at bedtime  tiotropium 2.5 MICROgram(s) Inhaler 2 Puff(s) Inhalation daily      Physical Exam:    Vitals:  Vital Signs Last 24 Hours  T(C): 36.7 (25 @ 04:35), Max: 36.7 (25 @ 04:35)  HR: 70 (25 @ 04:35) (69 - 71)  BP: 98/56 (25 @ 04:35) (98/56 - 108/65)  RR: 18 (25 @ 04:35) (18 - 18)  SpO2: 99% (25 @ 04:35) (99% - 100%)    Weight in k.6 ( @ 07:00)    I&O's Summary    03 Mar 2025 07:01  -  04 Mar 2025 07:00  --------------------------------------------------------  IN: 200 mL / OUT: 600 mL / NET: -400 mL        Tele:    General: No distress. Comfortable.  HEENT: EOM intact.  Neck: Neck supple. JVP not elevated. No masses  Chest: Clear to auscultation bilaterally  CV: Normal S1 and S2. No murmurs, rub, or gallops. Radial pulses normal.  Abdomen: Soft, non-distended, non-tender  Skin: No rashes or skin breakdown  Extremities: No LE edema  Neurology: Alert and oriented times three. Sensation intact  Psych: Affect normal    Labs:                        8.0    10.51 )-----------( 207      ( 04 Mar 2025 06:49 )             28.5         134[L]  |  92[L]  |  97[H]  ----------------------------<  91  4.2   |  28  |  2.68[H]    Ca    9.3      04 Mar 2025 06:49  Mg     2.4     03-04                     ADVANCED HEART FAILURE & TRANSPLANT  - PROGRESS NOTE  *To reach the NS2 Team from 8am to 5pm (MON-FRI), please call [phone].   _______________________________________________________________________________________________________    Subjective:  - NAEO  - Seen sitting upright in chair      Medications:  acetaminophen     Tablet .. 650 milliGRAM(s) Oral every 6 hours PRN  albuterol/ipratropium for Nebulization 3 milliLiter(s) Nebulizer every 6 hours PRN  aspirin enteric coated 81 milliGRAM(s) Oral daily  atorvastatin 20 milliGRAM(s) Oral at bedtime  buMETAnide 3 milliGRAM(s) Oral two times a day  dextrose 5%. 1000 milliLiter(s) IV Continuous <Continuous>  dextrose 5%. 1000 milliLiter(s) IV Continuous <Continuous>  dextrose 50% Injectable 25 Gram(s) IV Push once  dextrose 50% Injectable 12.5 Gram(s) IV Push once  dextrose 50% Injectable 25 Gram(s) IV Push once  dextrose Oral Gel 15 Gram(s) Oral once PRN  dextrose Oral Gel 15 Gram(s) Oral once PRN  epoetin lashawn (EPOGEN) Injectable 75600 Unit(s) SubCutaneous every 7 days  fluticasone propionate/ salmeterol 250-50 MICROgram(s) Diskus 1 Dose(s) Inhalation two times a day  folic acid 1 milliGRAM(s) Oral daily  glucagon  Injectable 1 milliGRAM(s) IntraMuscular once  hydrALAZINE 50 milliGRAM(s) Oral three times a day  insulin glargine Injectable (LANTUS) 18 Unit(s) SubCutaneous at bedtime  insulin lispro (ADMELOG) corrective regimen sliding scale   SubCutaneous three times a day before meals  insulin lispro Injectable (ADMELOG) 3 Unit(s) SubCutaneous three times a day before meals  isosorbide   dinitrate Tablet (ISORDIL) 20 milliGRAM(s) Oral three times a day  melatonin 3 milliGRAM(s) Oral at bedtime PRN  metolazone 5 milliGRAM(s) Oral daily  metoprolol succinate ER 50 milliGRAM(s) Oral daily  pantoprazole    Tablet 40 milliGRAM(s) Oral every 12 hours  psyllium Powder 1 Packet(s) Oral daily  rivaroxaban 15 milliGRAM(s) Oral with dinner  sodium chloride 0.65% Nasal 1 Spray(s) Both Nostrils two times a day  tamsulosin 0.4 milliGRAM(s) Oral at bedtime  tiotropium 2.5 MICROgram(s) Inhaler 2 Puff(s) Inhalation daily      Physical Exam:    Vitals:  Vital Signs Last 24 Hours  T(C): 36.7 (25 @ 04:35), Max: 36.7 (25 @ 04:35)  HR: 70 (25 @ 04:35) (69 - 71)  BP: 98/56 (25 @ 04:35) (98/56 - 108/65)  RR: 18 (25 @ 04:35) (18 - 18)  SpO2: 99% (25 @ 04:35) (99% - 100%)    Weight in k.6 ( @ 07:00)    I&O's Summary    03 Mar 2025 07:01  -  04 Mar 2025 07:00  --------------------------------------------------------  IN: 200 mL / OUT: 600 mL / NET: -400 mL    Tele: V Paced 60-90    General: No distress. Comfortable.  HEENT: EOM intact.  Neck: Difficult to assess, likely elevated  Chest: Clear to auscultation bilaterally  CV: Normal S1 and S2. No murmurs, rub, or gallops. Radial pulses normal, warm peripherally  Abdomen: Soft, non-distended, non-tender  Skin: No rashes or skin breakdown  Extremities: +1 BLE  Neurology: Alert and oriented times three. Sensation intact  Psych: Affect normal    Labs:                        8.0    10.51 )-----------( 207      ( 04 Mar 2025 06:49 )             28.5     03-04    134[L]  |  92[L]  |  97[H]  ----------------------------<  91  4.2   |  28  |  2.68[H]    Ca    9.3      04 Mar 2025 06:49  Mg     2.4     03-04

## 2025-03-04 NOTE — CONSULT NOTE ADULT - CONSULT REQUESTED BY NAME
Dr Kebede mis-transcribed.)    MINA HARRIS - CNP (electronically signed)              Deborah Rose, MINA - [facility]ICA  03/04/25 1416

## 2025-03-04 NOTE — PROGRESS NOTE ADULT - PROBLEM SELECTOR PLAN 1
EF 30% with severe tricuspid regurg and PASP 55mmHG on 2/7/24. Dry wt is 243 lbs.   - Diuretic: s/p Buex 2 mg PO AM. To give IV bumex 4 mg now followed by PO 4 mg BID starting this evening.   - Metolazone 5 mg PO daily x 2 doses  - GDMT:  Metoprolol succinate 50mg day. Holding entresto and aldactone for DENISHA. Not on SGLT2.  - Hydralazine/Nitrate: hydral 50mg TID. Isordil 20mg TID   - Cardiology following, patient declined RHC and cardiomems  - US abdomen to assess for ascites

## 2025-03-04 NOTE — PROGRESS NOTE ADULT - ASSESSMENT
78 y/o male w/ PMHx CAD s/p CABG, HFrEF (LVIDd 5.2, LVEF 25-30%) w/ DC- ICD, AFib (Xarelto), severe AS s/p TAVR, COPD on 3-4L home O2, CKD3, HTN, HLD, T2DM, and BPH presented to [facility] for ADHF, despite escalation of home diuretics. Now transferred to [facility] for refractory volume overload. His labs notable for stable Scr (though unclear baseline).  Repeat TTE shows severe BiV dysfunction. s/p RRT for hypotension given 500cc bolus on 2/11    GOC clarified and he remains DNR/DNI per wife - wants the ICD on. He has diuresed with IVP Bumex bur remains volume expanded.     Cardiac Studies  TTE 2/7/25 LVIDd 5.3cm, LVEF 30%, no LV thrombus, TAPSE 1.0cm, severely enlarged RV size/RVSF reduced, mod dilated LA, severe dilated RA, TAVR present, mod MR, severe TR, PASP 52, PASP 52, no pericardial effusion, IVC dilated 2.35cm  TTE 8/8/24; LVIDd 5.2, EF 25-30%, global hypokinesis, severe grade III DD, TASPE 1.1, TAVR present in AV position, mld-mod MR, mod TR, PASP 53

## 2025-03-04 NOTE — PROGRESS NOTE ADULT - SUBJECTIVE AND OBJECTIVE BOX
[facility] Division of Hospital Medicine  Gunner Newton DO  Available via MS Teams    SUBJECTIVE / OVERNIGHT EVENTS:  Denies chest pain, SOB, worsening abdominal distention, n/v/loss of appetite. Sitting up in chair without complaints.     MEDICATIONS  (STANDING):  allopurinol 100 milliGRAM(s) Oral daily  aspirin enteric coated 81 milliGRAM(s) Oral daily  atorvastatin 20 milliGRAM(s) Oral at bedtime  buMETAnide 4 milliGRAM(s) Oral two times a day  buMETAnide IVPB 4 milliGRAM(s) IV Intermittent once  dextrose 5%. 1000 milliLiter(s) (50 mL/Hr) IV Continuous <Continuous>  dextrose 5%. 1000 milliLiter(s) (100 mL/Hr) IV Continuous <Continuous>  dextrose 50% Injectable 25 Gram(s) IV Push once  dextrose 50% Injectable 12.5 Gram(s) IV Push once  dextrose 50% Injectable 25 Gram(s) IV Push once  epoetin lashawn (EPOGEN) Injectable 58102 Unit(s) SubCutaneous every 7 days  fluticasone propionate/ salmeterol 250-50 MICROgram(s) Diskus 1 Dose(s) Inhalation two times a day  folic acid 1 milliGRAM(s) Oral daily  glucagon  Injectable 1 milliGRAM(s) IntraMuscular once  hydrALAZINE 50 milliGRAM(s) Oral three times a day  insulin glargine Injectable (LANTUS) 18 Unit(s) SubCutaneous at bedtime  insulin lispro (ADMELOG) corrective regimen sliding scale   SubCutaneous three times a day before meals  insulin lispro Injectable (ADMELOG) 3 Unit(s) SubCutaneous three times a day before meals  isosorbide   dinitrate Tablet (ISORDIL) 20 milliGRAM(s) Oral three times a day  metolazone 5 milliGRAM(s) Oral daily  metoprolol succinate ER 50 milliGRAM(s) Oral daily  pantoprazole    Tablet 40 milliGRAM(s) Oral every 12 hours  psyllium Powder 1 Packet(s) Oral daily  rivaroxaban 15 milliGRAM(s) Oral with dinner  sodium chloride 0.65% Nasal 1 Spray(s) Both Nostrils two times a day  tamsulosin 0.4 milliGRAM(s) Oral at bedtime  tiotropium 2.5 MICROgram(s) Inhaler 2 Puff(s) Inhalation daily    MEDICATIONS  (PRN):  acetaminophen     Tablet .. 650 milliGRAM(s) Oral every 6 hours PRN Temp greater or equal to 38C (100.4F), Mild Pain (1 - 3)  albuterol/ipratropium for Nebulization 3 milliLiter(s) Nebulizer every 6 hours PRN Shortness of Breath and/or Wheezing  dextrose Oral Gel 15 Gram(s) Oral once PRN Blood Glucose LESS THAN 70 milliGRAM(s)/deciliter  dextrose Oral Gel 15 Gram(s) Oral once PRN Blood Glucose LESS THAN 70 milliGRAM(s)/deciliter  melatonin 3 milliGRAM(s) Oral at bedtime PRN Insomnia  Insomnia      I&O's Summary    03 Mar 2025 07:01  -  04 Mar 2025 07:00  --------------------------------------------------------  IN: 200 mL / OUT: 600 mL / NET: -400 mL          PHYSICAL EXAM:  Vital Signs Last 24 Hrs  T(C): 36.7 (04 Mar 2025 11:31), Max: 36.7 (04 Mar 2025 04:35)  T(F): 98.1 (04 Mar 2025 11:31), Max: 98.1 (04 Mar 2025 11:31)  HR: 70 (04 Mar 2025 11:31) (69 - 70)  BP: 108/64 (04 Mar 2025 11:31) (98/56 - 108/64)  BP(mean): --  RR: 18 (04 Mar 2025 11:31) (18 - 18)  SpO2: 96% (04 Mar 2025 11:31) (96% - 100%)    Parameters below as of 04 Mar 2025 11:31  Patient On (Oxygen Delivery Method): nasal cannula  O2 Flow (L/min): 4    CONSTITUTIONAL: NAD  EYES: EOMI, conjunctiva and sclera clear  NECK: Supple  RESPIRATORY: Normal respiratory effort; lungs are clear to auscultation bilaterally  CARDIOVASCULAR: Regular rate and rhythm, no murmur, 2+ pitting BLE edema to knee  ABDOMEN: Nontender to palpation, +distention  PSYCH: A+O to person, place, and time  NEUROLOGY: no FND    LABS:                         8.0    10.51 )-----------( 207      ( 04 Mar 2025 06:49 )             28.5     03-04    134[L]  |  92[L]  |  97[H]  ----------------------------<  91  4.2   |  28  |  2.68[H]    Ca    9.3      04 Mar 2025 06:49  Mg     2.4     03-04        Urinalysis Basic - ( 04 Mar 2025 06:49 )    Color: x / Appearance: x / SG: x / pH: x  Gluc: 91 mg/dL / Ketone: x  / Bili: x / Urobili: x   Blood: x / Protein: x / Nitrite: x   Leuk Esterase: x / RBC: x / WBC x   Sq Epi: x / Non Sq Epi: x / Bacteria: x      Labs  reviewed

## 2025-03-04 NOTE — PROGRESS NOTE ADULT - PROBLEM SELECTOR PLAN 1
Etiology likely ischemic. Primary cardiologist Dr. Jewel Stubbs ([facility]), last TTE LVEF 25-30%. Appears has been on some GDMT as outpt (?Farxiga, Coreg and previously on ARNI though unclear why stopped)  - Toprol XL 50mg QD  - Continue Hydral to 50mg TID and Isordil 20mg TID; Hold for SBP <90  - hold Entresto and dino for DENISHA  - Increase Bumex to 4mg PO BID; Please give Bumex 4mg IVP x1 dose this AM and metolazone 5mg PO today. Will plan to discharge with Furosix.   - Has ICD in place; Currently a DNR/DNI: trial NIV. Discussed with palliative and at this time wants ICD on as per wife  - Aggressive PT  - standing weights documented does not appear accurate  - Strict I/O's  - Target electrolyte repletion to target K+ >4.0 and Mg >2.0.  - likely not a good candidate for MEMs

## 2025-03-04 NOTE — CHART NOTE - NSCHARTNOTEFT_GEN_A_CORE
NUTRITION FOLLOW UP NOTE    PATIENT SEEN FOR: follow up on 3dsu    SOURCE: [x] Patient  [x] Current Medical Record  [] RN  [x] Family/support person at bedside  [] Patient unavailable/inappropriate  [] Other:    CHART REVIEWED/EVENTS NOTED.  [] No changes to nutrition care plan to note  [] Nutrition Status:    DIET ORDER:   Diet, Regular:   Consistent Carbohydrate {No Snacks} (CSTCHO)  DASH/TLC {Sodium & Cholesterol Restricted} (DASH)  1000mL Fluid Restriction ([id number])  Supplement Feeding Modality:  Oral  Glucerna Shake Cans or Servings Per Day:  1       Frequency:  Daily (25)      CURRENT DIET ORDER IS:  [] Appropriate:  [] Inadequate:  [x] Other: pt prefers caitlyn flavor Glucerna, ok as per provider-RD added service note for same    NUTRITION INTAKE/PROVISION:  [x] PO: intake varies as per spouse at bedside % of tray  [] Enteral Nutrition:  [] Parenteral Nutrition:    ANTHROPOMETRICS:  Drug Dosing Weight  Height (cm): 177.8 (10 Tay 2025 15:59)  Weight (kg): 107.501 (2025 09:08)  BMI (kg/m2): 34 (2025 09:08)  BSA (m2): 2.24 (2025 09:08)  Weights:   Daily Weight in k.6 (), Weight in k.9 (), Weight in k (), Weight in k.3 (), Weight in k.7 (), Weight in k.3 (), Weight in k.5 ()     NUTRITIONALLY PERTINENT MEDICATIONS:  MEDICATIONS  (STANDING):  allopurinol  atorvastatin  buMETAnide  dextrose 5%.  dextrose 5%.  dextrose 50% Injectable  dextrose 50% Injectable  dextrose 50% Injectable  folic acid  glucagon  Injectable  hydrALAZINE  insulin glargine Injectable (LANTUS)  insulin lispro (ADMELOG) corrective regimen sliding scale  insulin lispro Injectable (ADMELOG)  isosorbide   dinitrate Tablet (ISORDIL)  metolazone  metoprolol succinate ER  pantoprazole    Tablet  psyllium Powder       NUTRITIONALLY PERTINENT LABS:   Na134 mmol/L[L] Glu 91 mg/dL K+ 4.2 mmol/L Cr  2.68 mg/dL[H] BUN 97 mg/dL[H]  Phos 4.6 mg/dL[H]  Alb 3.6 g/dL ALT 12 U/L AST 22 U/L Alkaline Phosphatase 251 U/L[H]    A1C with Estimated Average Glucose Result: 7.2 % (25 @ 07:55)          Finger Sticks:  POCT Blood Glucose.: 155 mg/dL ( @ 13:25)  POCT Blood Glucose.: 125 mg/dL ( @ 12:13)  POCT Blood Glucose.: 135 mg/dL ( @ 08:19)  POCT Blood Glucose.: 165 mg/dL ( @ 21:12)  POCT Blood Glucose.: 90 mg/dL ( @ 17:12)      NUTRITIONALLY PERTINENT MEDICATIONS/LABS:  [x] Reviewed  [] Relevant notes on medications/labs:    EDEMA:  [x] Reviewed  [x] Relevant notes: right leg, bilateral knees, left arm, left ankle     GI/ I&O:  [x] Reviewed  [x] Relevant notes: last BM 3/3  [] Other:    SKIN:   [x] No pressure injuries documented, per nursing flow sheet  [] Pressure injury previously noted  [] Change in pressure injury documentation:  [] Other:    ESTIMATED NEEDS:  [x] No change:  [] Updated:  Energy: 5159-0248 kcal/day (25-30 kcal/kg)  Protein: 78-93 g/day (1-1.2 g/kg)  Fluid:   ml/day or [x] defer to team  Based on: IBW 77.5 kg on       NUTRITION DIAGNOSIS:  [x] Prior Dx: Food & Nutrition Related Knowledge Deficit, Inadequate Protein Energy Intake  [] New Dx:    EDUCATION:  [x] Yes: supplement  [] Not appropriate/warranted    NUTRITION CARE PLAN:  1. Diet: continue consistent carbohydrate/restricted fluid 1000ml, DASH  2. Supplements: continue Glucerna x 1 daily-changed flavor to caitlyn as per pt request, provider OK  3. Multivitamin/mineral supplementation:  4: adjusted preferences-added MRS DASH with meals as per pt request    [] Achieved - Continue current nutrition intervention(s)  [] Current medical condition precludes nutrition intervention at this time.    MONITORING AND EVALUATION:   RD remains available upon request and will follow up per protocol.    Name Sherron Farmer MA, PRAMOD, CDN/TEAMS   Available on MS TEAMS

## 2025-03-05 LAB
ANION GAP SERPL CALC-SCNC: 15 MMOL/L — SIGNIFICANT CHANGE UP (ref 5–17)
BUN SERPL-MCNC: 102 MG/DL — HIGH (ref 7–23)
CALCIUM SERPL-MCNC: 9.2 MG/DL — SIGNIFICANT CHANGE UP (ref 8.4–10.5)
CHLORIDE SERPL-SCNC: 91 MMOL/L — LOW (ref 96–108)
CO2 SERPL-SCNC: 29 MMOL/L — SIGNIFICANT CHANGE UP (ref 22–31)
CREAT SERPL-MCNC: 2.7 MG/DL — HIGH (ref 0.5–1.3)
EGFR: 23 ML/MIN/1.73M2 — LOW
EGFR: 23 ML/MIN/1.73M2 — LOW
GLUCOSE BLDC GLUCOMTR-MCNC: 144 MG/DL — HIGH (ref 70–99)
GLUCOSE BLDC GLUCOMTR-MCNC: 168 MG/DL — HIGH (ref 70–99)
GLUCOSE BLDC GLUCOMTR-MCNC: 187 MG/DL — HIGH (ref 70–99)
GLUCOSE BLDC GLUCOMTR-MCNC: 229 MG/DL — HIGH (ref 70–99)
GLUCOSE SERPL-MCNC: 161 MG/DL — HIGH (ref 70–99)
MAGNESIUM SERPL-MCNC: 2.5 MG/DL — SIGNIFICANT CHANGE UP (ref 1.6–2.6)
POTASSIUM SERPL-MCNC: 4 MMOL/L — SIGNIFICANT CHANGE UP (ref 3.5–5.3)
POTASSIUM SERPL-SCNC: 4 MMOL/L — SIGNIFICANT CHANGE UP (ref 3.5–5.3)
SODIUM SERPL-SCNC: 135 MMOL/L — SIGNIFICANT CHANGE UP (ref 135–145)

## 2025-03-05 PROCEDURE — 99233 SBSQ HOSP IP/OBS HIGH 50: CPT

## 2025-03-05 RX ORDER — BUMETANIDE 1 MG/1
4 TABLET ORAL ONCE
Refills: 0 | Status: COMPLETED | OUTPATIENT
Start: 2025-03-05 | End: 2025-03-05

## 2025-03-05 RX ORDER — BUMETANIDE 1 MG/1
2 TABLET ORAL
Qty: 20 | Refills: 0 | Status: DISCONTINUED | OUTPATIENT
Start: 2025-03-05 | End: 2025-03-14

## 2025-03-05 RX ADMIN — ISOSORBDIE DINITRATE 20 MILLIGRAM(S): 30 TABLET ORAL at 05:37

## 2025-03-05 RX ADMIN — Medication 50 MILLIGRAM(S): at 05:38

## 2025-03-05 RX ADMIN — INSULIN LISPRO 3 UNIT(S): 100 INJECTION, SOLUTION INTRAVENOUS; SUBCUTANEOUS at 13:19

## 2025-03-05 RX ADMIN — INSULIN LISPRO 2: 100 INJECTION, SOLUTION INTRAVENOUS; SUBCUTANEOUS at 17:52

## 2025-03-05 RX ADMIN — TAMSULOSIN HYDROCHLORIDE 0.4 MILLIGRAM(S): 0.4 CAPSULE ORAL at 21:54

## 2025-03-05 RX ADMIN — Medication 1 PACKET(S): at 12:03

## 2025-03-05 RX ADMIN — Medication 1 DOSE(S): at 17:06

## 2025-03-05 RX ADMIN — INSULIN GLARGINE-YFGN 18 UNIT(S): 100 INJECTION, SOLUTION SUBCUTANEOUS at 21:54

## 2025-03-05 RX ADMIN — Medication 40 MILLIGRAM(S): at 05:37

## 2025-03-05 RX ADMIN — IPRATROPIUM BROMIDE AND ALBUTEROL SULFATE 3 MILLILITER(S): .5; 2.5 SOLUTION RESPIRATORY (INHALATION) at 13:50

## 2025-03-05 RX ADMIN — Medication 10 MILLIEQUIVALENT(S): at 13:48

## 2025-03-05 RX ADMIN — ATORVASTATIN CALCIUM 20 MILLIGRAM(S): 80 TABLET, FILM COATED ORAL at 21:54

## 2025-03-05 RX ADMIN — Medication 50 MILLIGRAM(S): at 13:48

## 2025-03-05 RX ADMIN — INSULIN LISPRO 2: 100 INJECTION, SOLUTION INTRAVENOUS; SUBCUTANEOUS at 09:03

## 2025-03-05 RX ADMIN — INSULIN LISPRO 4: 100 INJECTION, SOLUTION INTRAVENOUS; SUBCUTANEOUS at 13:19

## 2025-03-05 RX ADMIN — Medication 50 MILLIGRAM(S): at 21:54

## 2025-03-05 RX ADMIN — BUMETANIDE 132 MILLIGRAM(S): 1 TABLET ORAL at 13:49

## 2025-03-05 RX ADMIN — INSULIN LISPRO 3 UNIT(S): 100 INJECTION, SOLUTION INTRAVENOUS; SUBCUTANEOUS at 09:03

## 2025-03-05 RX ADMIN — INSULIN LISPRO 3 UNIT(S): 100 INJECTION, SOLUTION INTRAVENOUS; SUBCUTANEOUS at 17:52

## 2025-03-05 RX ADMIN — TIOTROPIUM BROMIDE INHALATION SPRAY 2 PUFF(S): 3.12 SPRAY, METERED RESPIRATORY (INHALATION) at 13:23

## 2025-03-05 RX ADMIN — FOLIC ACID 1 MILLIGRAM(S): 1 TABLET ORAL at 12:03

## 2025-03-05 RX ADMIN — Medication 100 MILLIGRAM(S): at 12:03

## 2025-03-05 RX ADMIN — METOPROLOL SUCCINATE 50 MILLIGRAM(S): 50 TABLET, EXTENDED RELEASE ORAL at 10:09

## 2025-03-05 RX ADMIN — Medication 81 MILLIGRAM(S): at 12:03

## 2025-03-05 RX ADMIN — Medication 1 SPRAY(S): at 17:06

## 2025-03-05 RX ADMIN — RIVAROXABAN 15 MILLIGRAM(S): 10 TABLET, FILM COATED ORAL at 17:05

## 2025-03-05 RX ADMIN — ISOSORBDIE DINITRATE 20 MILLIGRAM(S): 30 TABLET ORAL at 10:09

## 2025-03-05 RX ADMIN — Medication 1 SPRAY(S): at 05:38

## 2025-03-05 RX ADMIN — ISOSORBDIE DINITRATE 20 MILLIGRAM(S): 30 TABLET ORAL at 17:06

## 2025-03-05 RX ADMIN — Medication 1 DOSE(S): at 05:38

## 2025-03-05 RX ADMIN — BUMETANIDE 5 MG/HR: 1 TABLET ORAL at 14:55

## 2025-03-05 RX ADMIN — Medication 40 MILLIGRAM(S): at 17:06

## 2025-03-05 RX ADMIN — BUMETANIDE 4 MILLIGRAM(S): 1 TABLET ORAL at 05:36

## 2025-03-05 NOTE — PROGRESS NOTE ADULT - SUBJECTIVE AND OBJECTIVE BOX
[facility] Division of Hospital Medicine  Gunner Newton DO  Available via MS Teams    SUBJECTIVE / OVERNIGHT EVENTS:  Resting comfortable in bed. Denies any complaints at this time.     MEDICATIONS  (STANDING):  allopurinol 100 milliGRAM(s) Oral daily  aspirin enteric coated 81 milliGRAM(s) Oral daily  atorvastatin 20 milliGRAM(s) Oral at bedtime  buMETAnide 4 milliGRAM(s) Oral two times a day  dextrose 5%. 1000 milliLiter(s) (50 mL/Hr) IV Continuous <Continuous>  dextrose 5%. 1000 milliLiter(s) (100 mL/Hr) IV Continuous <Continuous>  dextrose 50% Injectable 25 Gram(s) IV Push once  dextrose 50% Injectable 12.5 Gram(s) IV Push once  dextrose 50% Injectable 25 Gram(s) IV Push once  epoetin lashawn (EPOGEN) Injectable 76846 Unit(s) SubCutaneous every 7 days  fluticasone propionate/ salmeterol 250-50 MICROgram(s) Diskus 1 Dose(s) Inhalation two times a day  folic acid 1 milliGRAM(s) Oral daily  glucagon  Injectable 1 milliGRAM(s) IntraMuscular once  hydrALAZINE 50 milliGRAM(s) Oral three times a day  insulin glargine Injectable (LANTUS) 18 Unit(s) SubCutaneous at bedtime  insulin lispro (ADMELOG) corrective regimen sliding scale   SubCutaneous three times a day before meals  insulin lispro Injectable (ADMELOG) 3 Unit(s) SubCutaneous three times a day before meals  isosorbide   dinitrate Tablet (ISORDIL) 20 milliGRAM(s) Oral three times a day  metoprolol succinate ER 50 milliGRAM(s) Oral daily  pantoprazole    Tablet 40 milliGRAM(s) Oral every 12 hours  psyllium Powder 1 Packet(s) Oral daily  rivaroxaban 15 milliGRAM(s) Oral with dinner  sodium chloride 0.65% Nasal 1 Spray(s) Both Nostrils two times a day  tamsulosin 0.4 milliGRAM(s) Oral at bedtime  tiotropium 2.5 MICROgram(s) Inhaler 2 Puff(s) Inhalation daily    MEDICATIONS  (PRN):  acetaminophen     Tablet .. 650 milliGRAM(s) Oral every 6 hours PRN Temp greater or equal to 38C (100.4F), Mild Pain (1 - 3)  albuterol/ipratropium for Nebulization 3 milliLiter(s) Nebulizer every 6 hours PRN Shortness of Breath and/or Wheezing  dextrose Oral Gel 15 Gram(s) Oral once PRN Blood Glucose LESS THAN 70 milliGRAM(s)/deciliter  dextrose Oral Gel 15 Gram(s) Oral once PRN Blood Glucose LESS THAN 70 milliGRAM(s)/deciliter  melatonin 3 milliGRAM(s) Oral at bedtime PRN Insomnia        I&O's Summary    04 Mar 2025 07:01  -  05 Mar 2025 07:00  --------------------------------------------------------  IN: 100 mL / OUT: 800 mL / NET: -700 mL    05 Mar 2025 07:01  -  05 Mar 2025 11:40  --------------------------------------------------------  IN: 0 mL / OUT: 300 mL / NET: -300 mL        PHYSICAL EXAM:  Vital Signs Last 24 Hrs  T(C): 36.5 (05 Mar 2025 11:32), Max: 36.7 (05 Mar 2025 04:18)  T(F): 97.7 (05 Mar 2025 11:32), Max: 98 (05 Mar 2025 04:18)  HR: 67 (05 Mar 2025 11:32) (67 - 70)  BP: 114/65 (05 Mar 2025 11:32) (105/55 - 115/70)  BP(mean): --  RR: 18 (05 Mar 2025 11:32) (18 - 18)  SpO2: 98% (05 Mar 2025 11:32) (96% - 98%)    Parameters below as of 05 Mar 2025 11:32  Patient On (Oxygen Delivery Method): nasal cannula  O2 Flow (L/min): 4    CONSTITUTIONAL: NAD  EYES: EOMI, conjunctiva and sclera clear  NECK: Supple  RESPIRATORY: Normal respiratory effort; lungs are clear to auscultation bilaterally  CARDIOVASCULAR: Regular rate and rhythm, no murmur, 2-3+ pitting BLE edema to knee  ABDOMEN: Nontender to palpation, +distention  PSYCH: A+O to person, place, and time  NEUROLOGY: no FND    LABS:                         8.0    10.51 )-----------( 207      ( 04 Mar 2025 06:49 )             28.5     03-05    135  |  91[L]  |  102[H]  ----------------------------<  161[H]  4.0   |  29  |  2.70[H]    Ca    9.2      05 Mar 2025 06:57  Mg     2.5     03-05        Urinalysis Basic - ( 05 Mar 2025 06:57 )    Color: x / Appearance: x / SG: x / pH: x  Gluc: 161 mg/dL / Ketone: x  / Bili: x / Urobili: x   Blood: x / Protein: x / Nitrite: x   Leuk Esterase: x / RBC: x / WBC x   Sq Epi: x / Non Sq Epi: x / Bacteria: x        Labs and imaging reviewed

## 2025-03-05 NOTE — PROGRESS NOTE ADULT - ASSESSMENT
78 y/o male w/ PMHx CAD s/p CABG, HFrEF (LVIDd 5.2, LVEF 25-30%) w/ DC- ICD, AFib (Xarelto), severe AS s/p TAVR, COPD on 3-4L home O2, CKD3, HTN, HLD, T2DM, and BPH presented to [facility] for ADHF, despite escalation of home diuretics. Now transferred to [facility] for refractory volume overload. His labs notable for stable Scr (though unclear baseline).  Repeat TTE shows severe BiV dysfunction. s/p RRT for hypotension given 500cc bolus on 2/11    GOC clarified and he remains DNR/DNI per wife - wants the ICD on. He remains volume overloaded, unclear response to addition of metolazone given no standing weights and incomplete I/Os, however patient doesn't report significantly increased UOP. Cr stable. Moderate abdominal ascites noted on abdominal ultrasound. Will attempt to further diurese with escalation to bumex gtt. Plan for rehab when better optimized.    Cardiac Studies  TTE 2/7/25 LVIDd 5.3cm, LVEF 30%, no LV thrombus, TAPSE 1.0cm, severely enlarged RV size/RVSF reduced, mod dilated LA, severe dilated RA, TAVR present, mod MR, severe TR, PASP 52, PASP 52, no pericardial effusion, IVC dilated 2.35cm  TTE 8/8/24; LVIDd 5.2, EF 25-30%, global hypokinesis, severe grade III DD, TASPE 1.1, TAVR present in AV position, mld-mod MR, mod TR, PASP 53

## 2025-03-05 NOTE — PROGRESS NOTE ADULT - SUBJECTIVE AND OBJECTIVE BOX
No distress, on NC O2    Vital Signs Last 24 Hrs  T(C): 36.5 (03-05-25 @ 16:38), Max: 36.7 (03-05-25 @ 04:18)  T(F): 97.7 (03-05-25 @ 16:38), Max: 98 (03-05-25 @ 04:18)  HR: 70 (03-05-25 @ 16:38) (67 - 76)  BP: 118/72 (03-05-25 @ 16:38) (105/55 - 121/71)  RR: 18 (03-05-25 @ 16:38) (18 - 18)  SpO2: 98% (03-05-25 @ 16:38) (88% - 98%)    I&O's Detail    04 Mar 2025 07:01  -  05 Mar 2025 07:00  --------------------------------------------------------  OUT:    Voided (mL): 800 mL  Total OUT: 800 mL    05 Mar 2025 07:01  -  05 Mar 2025 17:05  --------------------------------------------------------  OUT:    Voided (mL): 600 mL  Total OUT: 600 mL    s1s2  b/l air entry  soft, ascites   edema                                                                       8.0    10.51 )-----------( 207      ( 04 Mar 2025 06:49 )             28.5     05 Mar 2025 06:57    135    |  91     |  102    ----------------------------<  161    4.0     |  29     |  2.70     Ca    9.2        05 Mar 2025 06:57  Mg     2.5       05 Mar 2025 06:57    acetaminophen     Tablet .. 650 milliGRAM(s) Oral every 6 hours PRN  albuterol/ipratropium for Nebulization 3 milliLiter(s) Nebulizer every 6 hours PRN  allopurinol 100 milliGRAM(s) Oral daily  aspirin enteric coated 81 milliGRAM(s) Oral daily  atorvastatin 20 milliGRAM(s) Oral at bedtime  buMETAnide Infusion 1 mG/Hr IV Continuous <Continuous>  dextrose 5%. 1000 milliLiter(s) IV Continuous <Continuous>  dextrose 5%. 1000 milliLiter(s) IV Continuous <Continuous>  dextrose 50% Injectable 25 Gram(s) IV Push once  dextrose 50% Injectable 12.5 Gram(s) IV Push once  dextrose 50% Injectable 25 Gram(s) IV Push once  dextrose Oral Gel 15 Gram(s) Oral once PRN  dextrose Oral Gel 15 Gram(s) Oral once PRN  epoetin lashawn (EPOGEN) Injectable 08439 Unit(s) SubCutaneous every 7 days  fluticasone propionate/ salmeterol 250-50 MICROgram(s) Diskus 1 Dose(s) Inhalation two times a day  folic acid 1 milliGRAM(s) Oral daily  glucagon  Injectable 1 milliGRAM(s) IntraMuscular once  hydrALAZINE 50 milliGRAM(s) Oral three times a day  insulin glargine Injectable (LANTUS) 18 Unit(s) SubCutaneous at bedtime  insulin lispro (ADMELOG) corrective regimen sliding scale   SubCutaneous three times a day before meals  insulin lispro Injectable (ADMELOG) 3 Unit(s) SubCutaneous three times a day before meals  isosorbide   dinitrate Tablet (ISORDIL) 20 milliGRAM(s) Oral three times a day  melatonin 3 milliGRAM(s) Oral at bedtime PRN  metoprolol succinate ER 50 milliGRAM(s) Oral daily  pantoprazole    Tablet 40 milliGRAM(s) Oral every 12 hours  potassium chloride    Tablet ER 10 milliEquivalent(s) Oral daily  psyllium Powder 1 Packet(s) Oral daily  rivaroxaban 15 milliGRAM(s) Oral with dinner  sodium chloride 0.65% Nasal 1 Spray(s) Both Nostrils two times a day  tamsulosin 0.4 milliGRAM(s) Oral at bedtime  tiotropium 2.5 MICROgram(s) Inhaler 2 Puff(s) Inhalation daily    A/P:    CM, EF ~ 30%, severe TR  Adm to [facility] VS 1/10 w/fluid overload   Tx to [facility] 2/5 for further management   Known CKD 3, (baseline Cr ~ 2. - 2.5)  Hemodynamic/cardio-renal fluctuations in Cr  Diuresis per HF team  F/u BMP, Mg, UO  Avoid nephrotoxins as possible   Overall options are limited and prognosis is guarded   D/w pt and family again today, the pt does not wish to be on HD if/when became necessary  Will continue to discuss     [phone]

## 2025-03-05 NOTE — PROGRESS NOTE ADULT - PROBLEM SELECTOR PLAN 1
EF 30% with severe tricuspid regurg and PASP 55mmHG on 2/7/24. Dry wt is 243 lbs.   - Diuretic: Bumex 4mg PO BID, re-dose metolazone today  - GDMT:  Metoprolol succinate 50mg day. Holding entresto and aldactone for DENISHA. Not on SGLT2.  - Hydralazine/Nitrate: hydral 50mg TID. Isordil 20mg TID   - Cardiology following, patient declined RHC and cardiomems  - Patient without great reponse to diuretics yesterday. US abdomen with moderate ascites and still with significant LE edema. Will f/u with HF if plan to transition to bumex gtt.

## 2025-03-05 NOTE — PROGRESS NOTE ADULT - SUBJECTIVE AND OBJECTIVE BOX
ADVANCED HEART FAILURE & TRANSPLANT  - PROGRESS NOTE  *To reach the NS2 Team from 8am to 5pm, please call [phone]   ___________________________________________________________________________  Subjective:    Medications:  acetaminophen     Tablet .. 650 milliGRAM(s) Oral every 6 hours PRN  albuterol/ipratropium for Nebulization 3 milliLiter(s) Nebulizer every 6 hours PRN  allopurinol 100 milliGRAM(s) Oral daily  aspirin enteric coated 81 milliGRAM(s) Oral daily  atorvastatin 20 milliGRAM(s) Oral at bedtime  buMETAnide Infusion 1 mG/Hr IV Continuous <Continuous>  buMETAnide IVPB 4 milliGRAM(s) IV Intermittent once  dextrose 5%. 1000 milliLiter(s) IV Continuous <Continuous>  dextrose 5%. 1000 milliLiter(s) IV Continuous <Continuous>  dextrose 50% Injectable 25 Gram(s) IV Push once  dextrose 50% Injectable 12.5 Gram(s) IV Push once  dextrose 50% Injectable 25 Gram(s) IV Push once  dextrose Oral Gel 15 Gram(s) Oral once PRN  dextrose Oral Gel 15 Gram(s) Oral once PRN  epoetin lashawn (EPOGEN) Injectable 38788 Unit(s) SubCutaneous every 7 days  fluticasone propionate/ salmeterol 250-50 MICROgram(s) Diskus 1 Dose(s) Inhalation two times a day  folic acid 1 milliGRAM(s) Oral daily  glucagon  Injectable 1 milliGRAM(s) IntraMuscular once  hydrALAZINE 50 milliGRAM(s) Oral three times a day  insulin glargine Injectable (LANTUS) 18 Unit(s) SubCutaneous at bedtime  insulin lispro (ADMELOG) corrective regimen sliding scale   SubCutaneous three times a day before meals  insulin lispro Injectable (ADMELOG) 3 Unit(s) SubCutaneous three times a day before meals  isosorbide   dinitrate Tablet (ISORDIL) 20 milliGRAM(s) Oral three times a day  melatonin 3 milliGRAM(s) Oral at bedtime PRN  metoprolol succinate ER 50 milliGRAM(s) Oral daily  pantoprazole    Tablet 40 milliGRAM(s) Oral every 12 hours  potassium chloride    Tablet ER 10 milliEquivalent(s) Oral daily  psyllium Powder 1 Packet(s) Oral daily  rivaroxaban 15 milliGRAM(s) Oral with dinner  sodium chloride 0.65% Nasal 1 Spray(s) Both Nostrils two times a day  tamsulosin 0.4 milliGRAM(s) Oral at bedtime  tiotropium 2.5 MICROgram(s) Inhaler 2 Puff(s) Inhalation daily      Physical Exam:    Vitals:  Vital Signs Last 24 Hours  T(C): 36.5 (25 @ 11:32), Max: 36.7 (25 @ 04:18)  HR: 70 (25 @ 12:00) (67 - 70)  BP: 114/65 (25 @ 11:32) (105/55 - 115/70)  RR: 18 (25 @ 11:32) (18 - 18)  SpO2: 88% (25 @ 12:00) (88% - 98%)    Weight in k.1 ( @ 04:18)    I&O's Summary    04 Mar 2025 07:  -  05 Mar 2025 07:00  --------------------------------------------------------  IN: 100 mL / OUT: 800 mL / NET: -700 mL    05 Mar 2025 07:  -  05 Mar 2025 12:56  --------------------------------------------------------  IN: 0 mL / OUT: 300 mL / NET: -300 mL        Tele:    General: No distress. Comfortable.  HEENT: EOM intact.  Neck: Neck supple. JVP not elevated. No masses  Chest: Clear to auscultation bilaterally  CV: Normal S1 and S2. No murmurs, rub, or gallops. Radial pulses normal.  Abdomen: Soft, non-distended, non-tender  Skin: No rashes or skin breakdown  Neurology: Alert and oriented times three. Sensation intact  Psych: Affect normal    Labs:                        8.0    10.51 )-----------( 207      ( 04 Mar 2025 06:49 )             28.5     03-05    135  |  91[L]  |  102[H]  ----------------------------<  161[H]  4.0   |  29  |  2.70[H]    Ca    9.2      05 Mar 2025 06:57  Mg     2.5     -05                     ADVANCED HEART FAILURE & TRANSPLANT  - PROGRESS NOTE  *To reach the NS2 Team from 8am to 5pm, please call [phone]   ___________________________________________________________________________  Subjective:  - ongoing orthopnea, abdominal distension and LE edema. OOB in recliner  - abd ultrasound with moderate ascites    Medications:  acetaminophen     Tablet .. 650 milliGRAM(s) Oral every 6 hours PRN  albuterol/ipratropium for Nebulization 3 milliLiter(s) Nebulizer every 6 hours PRN  allopurinol 100 milliGRAM(s) Oral daily  aspirin enteric coated 81 milliGRAM(s) Oral daily  atorvastatin 20 milliGRAM(s) Oral at bedtime  buMETAnide Infusion 1 mG/Hr IV Continuous <Continuous>  buMETAnide IVPB 4 milliGRAM(s) IV Intermittent once  dextrose 5%. 1000 milliLiter(s) IV Continuous <Continuous>  dextrose 5%. 1000 milliLiter(s) IV Continuous <Continuous>  dextrose 50% Injectable 25 Gram(s) IV Push once  dextrose 50% Injectable 12.5 Gram(s) IV Push once  dextrose 50% Injectable 25 Gram(s) IV Push once  dextrose Oral Gel 15 Gram(s) Oral once PRN  dextrose Oral Gel 15 Gram(s) Oral once PRN  epoetin lashawn (EPOGEN) Injectable 41216 Unit(s) SubCutaneous every 7 days  fluticasone propionate/ salmeterol 250-50 MICROgram(s) Diskus 1 Dose(s) Inhalation two times a day  folic acid 1 milliGRAM(s) Oral daily  glucagon  Injectable 1 milliGRAM(s) IntraMuscular once  hydrALAZINE 50 milliGRAM(s) Oral three times a day  insulin glargine Injectable (LANTUS) 18 Unit(s) SubCutaneous at bedtime  insulin lispro (ADMELOG) corrective regimen sliding scale   SubCutaneous three times a day before meals  insulin lispro Injectable (ADMELOG) 3 Unit(s) SubCutaneous three times a day before meals  isosorbide   dinitrate Tablet (ISORDIL) 20 milliGRAM(s) Oral three times a day  melatonin 3 milliGRAM(s) Oral at bedtime PRN  metoprolol succinate ER 50 milliGRAM(s) Oral daily  pantoprazole    Tablet 40 milliGRAM(s) Oral every 12 hours  potassium chloride    Tablet ER 10 milliEquivalent(s) Oral daily  psyllium Powder 1 Packet(s) Oral daily  rivaroxaban 15 milliGRAM(s) Oral with dinner  sodium chloride 0.65% Nasal 1 Spray(s) Both Nostrils two times a day  tamsulosin 0.4 milliGRAM(s) Oral at bedtime  tiotropium 2.5 MICROgram(s) Inhaler 2 Puff(s) Inhalation daily      Physical Exam:    Vitals:  Vital Signs Last 24 Hours  T(C): 36.5 (25 @ 11:32), Max: 36.7 (25 @ 04:18)  HR: 70 (25 @ 12:00) (67 - 70)  BP: 114/65 (25 @ 11:32) (105/55 - 115/70)  RR: 18 (25 @ 11:32) (18 - 18)  SpO2: 88% (25 @ 12:00) (88% - 98%)    Weight in k.1 ( @ 04:18)    I&O's Summary    04 Mar 2025 07:01  -  05 Mar 2025 07:00  --------------------------------------------------------  IN: 100 mL / OUT: 800 mL / NET: -700 mL    05 Mar 2025 07:  -  05 Mar 2025 12:56  --------------------------------------------------------  IN: 0 mL / OUT: 300 mL / NET: -300 mL    Tele: paced    General: No distress. Comfortable.  HEENT: EOM intact.  Neck: Neck supple. JVP > 20 cm H2O. No masses  Chest: Diminished bilateral bases, unlabored  CV: Normal S1 and S2. +2 BLE edema  Abdomen: Tense, distended, obese  Skin: No rashes or skin breakdown  Neurology: Alert and oriented times three. Sensation intact  Psych: Affect normal    Labs:                        8.0    10.51 )-----------( 207      ( 04 Mar 2025 06:49 )             28.5     03-05    135  |  91[L]  |  102[H]  ----------------------------<  161[H]  4.0   |  29  |  2.70[H]    Ca    9.2      05 Mar 2025 06:57  Mg     2.5     03-05

## 2025-03-05 NOTE — PROGRESS NOTE ADULT - PROBLEM SELECTOR PLAN 1
Etiology likely ischemic. Primary cardiologist Dr. Jewel Stubbs ([facility]), last TTE LVEF 25-30%. Appears has been on some GDMT as outpt (?Farxiga, Coreg and previously on ARNI though unclear why stopped)  - Toprol XL 50mg QD  - Continue Hydral to 50mg TID and Isordil 20mg TID; Hold for SBP <90  - holding Entresto and dino for DENISHA  - Please give Bumex 4mg IVP x1 dose and start bumex gtt at 1mg/hour. Will give metolazone 5mg daily in AM. Please start standing KCl. Will plan to discharge with Furosix.   - Has ICD in place; Currently a DNR/DNI: trial NIV. Discussed with palliative and at this time wants ICD on as per wife  - Aggressive PT  - standing weights documented does not appear accurate  - Strict I/O's  - Target electrolyte repletion to target K+ >4.0 and Mg >2.0.  - likely not a good candidate for MEMs

## 2025-03-05 NOTE — PROGRESS NOTE ADULT - ASSESSMENT
80 y/o male w/ PMHx CAD s/p CABG, HF (combined HFrEF [EF 25%] and HFpEF [G3DD]) w/ ICD, AFib on Xarelto, severe AS s/p TAVR, COPD on 3-4L home O2, CKD4, HTN, HLD, T2DM, and BPH who presents as a transfer from [facility] for HF evaluation. Ongoing CHF exacerbation now off bumex gtt, new DENISHA on CKD

## 2025-03-06 LAB
ANION GAP SERPL CALC-SCNC: 15 MMOL/L — SIGNIFICANT CHANGE UP (ref 5–17)
BUN SERPL-MCNC: 104 MG/DL — HIGH (ref 7–23)
CALCIUM SERPL-MCNC: 9.2 MG/DL — SIGNIFICANT CHANGE UP (ref 8.4–10.5)
CHLORIDE SERPL-SCNC: 91 MMOL/L — LOW (ref 96–108)
CO2 SERPL-SCNC: 29 MMOL/L — SIGNIFICANT CHANGE UP (ref 22–31)
CREAT SERPL-MCNC: 2.87 MG/DL — HIGH (ref 0.5–1.3)
EGFR: 21 ML/MIN/1.73M2 — LOW
EGFR: 21 ML/MIN/1.73M2 — LOW
GLUCOSE BLDC GLUCOMTR-MCNC: 188 MG/DL — HIGH (ref 70–99)
GLUCOSE BLDC GLUCOMTR-MCNC: 190 MG/DL — HIGH (ref 70–99)
GLUCOSE BLDC GLUCOMTR-MCNC: 213 MG/DL — HIGH (ref 70–99)
GLUCOSE BLDC GLUCOMTR-MCNC: 214 MG/DL — HIGH (ref 70–99)
GLUCOSE SERPL-MCNC: 160 MG/DL — HIGH (ref 70–99)
POTASSIUM SERPL-MCNC: 3.7 MMOL/L — SIGNIFICANT CHANGE UP (ref 3.5–5.3)
POTASSIUM SERPL-SCNC: 3.7 MMOL/L — SIGNIFICANT CHANGE UP (ref 3.5–5.3)
SODIUM SERPL-SCNC: 135 MMOL/L — SIGNIFICANT CHANGE UP (ref 135–145)

## 2025-03-06 PROCEDURE — 99233 SBSQ HOSP IP/OBS HIGH 50: CPT

## 2025-03-06 RX ORDER — POLYETHYLENE GLYCOL 3350 17 G/17G
17 POWDER, FOR SOLUTION ORAL DAILY
Refills: 0 | Status: DISCONTINUED | OUTPATIENT
Start: 2025-03-06 | End: 2025-03-10

## 2025-03-06 RX ADMIN — ISOSORBDIE DINITRATE 20 MILLIGRAM(S): 30 TABLET ORAL at 17:03

## 2025-03-06 RX ADMIN — INSULIN LISPRO 3 UNIT(S): 100 INJECTION, SOLUTION INTRAVENOUS; SUBCUTANEOUS at 17:51

## 2025-03-06 RX ADMIN — IPRATROPIUM BROMIDE AND ALBUTEROL SULFATE 3 MILLILITER(S): .5; 2.5 SOLUTION RESPIRATORY (INHALATION) at 08:27

## 2025-03-06 RX ADMIN — Medication 1 SPRAY(S): at 05:25

## 2025-03-06 RX ADMIN — Medication 1 DOSE(S): at 05:25

## 2025-03-06 RX ADMIN — Medication 50 MILLIGRAM(S): at 21:47

## 2025-03-06 RX ADMIN — Medication 1 PACKET(S): at 11:13

## 2025-03-06 RX ADMIN — FOLIC ACID 1 MILLIGRAM(S): 1 TABLET ORAL at 11:12

## 2025-03-06 RX ADMIN — METOPROLOL SUCCINATE 50 MILLIGRAM(S): 50 TABLET, EXTENDED RELEASE ORAL at 10:02

## 2025-03-06 RX ADMIN — POLYETHYLENE GLYCOL 3350 17 GRAM(S): 17 POWDER, FOR SOLUTION ORAL at 17:50

## 2025-03-06 RX ADMIN — TAMSULOSIN HYDROCHLORIDE 0.4 MILLIGRAM(S): 0.4 CAPSULE ORAL at 21:47

## 2025-03-06 RX ADMIN — BUMETANIDE 5 MG/HR: 1 TABLET ORAL at 21:47

## 2025-03-06 RX ADMIN — RIVAROXABAN 15 MILLIGRAM(S): 10 TABLET, FILM COATED ORAL at 17:03

## 2025-03-06 RX ADMIN — Medication 1 DOSE(S): at 17:03

## 2025-03-06 RX ADMIN — Medication 10 MILLIEQUIVALENT(S): at 11:13

## 2025-03-06 RX ADMIN — ISOSORBDIE DINITRATE 20 MILLIGRAM(S): 30 TABLET ORAL at 05:25

## 2025-03-06 RX ADMIN — INSULIN LISPRO 3 UNIT(S): 100 INJECTION, SOLUTION INTRAVENOUS; SUBCUTANEOUS at 13:43

## 2025-03-06 RX ADMIN — Medication 1 SPRAY(S): at 17:08

## 2025-03-06 RX ADMIN — INSULIN LISPRO 3 UNIT(S): 100 INJECTION, SOLUTION INTRAVENOUS; SUBCUTANEOUS at 09:27

## 2025-03-06 RX ADMIN — INSULIN LISPRO 2: 100 INJECTION, SOLUTION INTRAVENOUS; SUBCUTANEOUS at 13:43

## 2025-03-06 RX ADMIN — Medication 40 MILLIGRAM(S): at 05:24

## 2025-03-06 RX ADMIN — INSULIN LISPRO 2: 100 INJECTION, SOLUTION INTRAVENOUS; SUBCUTANEOUS at 09:26

## 2025-03-06 RX ADMIN — Medication 50 MILLIGRAM(S): at 05:25

## 2025-03-06 RX ADMIN — ATORVASTATIN CALCIUM 20 MILLIGRAM(S): 80 TABLET, FILM COATED ORAL at 21:47

## 2025-03-06 RX ADMIN — Medication 100 MILLIGRAM(S): at 11:12

## 2025-03-06 RX ADMIN — ISOSORBDIE DINITRATE 20 MILLIGRAM(S): 30 TABLET ORAL at 10:02

## 2025-03-06 RX ADMIN — Medication 50 MILLIGRAM(S): at 13:43

## 2025-03-06 RX ADMIN — TIOTROPIUM BROMIDE INHALATION SPRAY 2 PUFF(S): 3.12 SPRAY, METERED RESPIRATORY (INHALATION) at 11:13

## 2025-03-06 RX ADMIN — Medication 81 MILLIGRAM(S): at 11:13

## 2025-03-06 RX ADMIN — INSULIN GLARGINE-YFGN 18 UNIT(S): 100 INJECTION, SOLUTION SUBCUTANEOUS at 21:47

## 2025-03-06 RX ADMIN — Medication 40 MILLIGRAM(S): at 17:03

## 2025-03-06 RX ADMIN — INSULIN LISPRO 4: 100 INJECTION, SOLUTION INTRAVENOUS; SUBCUTANEOUS at 17:51

## 2025-03-06 NOTE — PROGRESS NOTE ADULT - PROBLEM SELECTOR PLAN 1
Etiology likely ischemic. Primary cardiologist Dr. Jewel Stubbs ([facility]), last TTE LVEF 25-30%. Appears has been on some GDMT as outpt (?Farxiga, Coreg and previously on ARNI though unclear why stopped)  - Toprol XL 50mg QD  - Continue Hydral to 50mg TID and Isordil 20mg TID; Hold for SBP <90  - hold Entresto and dino for DENISHA  - Continue Bumex @1mg/hr and metolazone 5mg PO today; Will plan to discharge with Furosix.   - Has ICD in place; Currently a DNR/DNI: trial NIV. Discussed with palliative and at this time wants ICD on as per wife  - Aggressive PT  - standing weights documented does not appear accurate  - Strict I/O's  - Target electrolyte repletion to target K+ >4.0 and Mg >2.0.  - likely not a good candidate for MEMs

## 2025-03-06 NOTE — PROGRESS NOTE ADULT - ASSESSMENT
78 y/o male w/ PMHx CAD s/p CABG, HFrEF (LVIDd 5.2, LVEF 25-30%) w/ DC- ICD, AFib (Xarelto), severe AS s/p TAVR, COPD on 3-4L home O2, CKD3, HTN, HLD, T2DM, and BPH presented to [facility] for ADHF, despite escalation of home diuretics. Now transferred to [facility] for refractory volume overload. His labs notable for stable Scr (though unclear baseline).  Repeat TTE shows severe BiV dysfunction. s/p RRT for hypotension given 500cc bolus on 2/11    GOC clarified and he remains DNR/DNI per wife - wants the ICD on. Bumex escalated to continuos infusion but remains volume expanded.     Cardiac Studies  TTE 2/7/25 LVIDd 5.3cm, LVEF 30%, no LV thrombus, TAPSE 1.0cm, severely enlarged RV size/RVSF reduced, mod dilated LA, severe dilated RA, TAVR present, mod MR, severe TR, PASP 52, PASP 52, no pericardial effusion, IVC dilated 2.35cm  TTE 8/8/24; LVIDd 5.2, EF 25-30%, global hypokinesis, severe grade III DD, TASPE 1.1, TAVR present in AV position, mld-mod MR, mod TR, PASP 53

## 2025-03-06 NOTE — PROGRESS NOTE ADULT - ASSESSMENT
80 y/o male w/ PMHx CAD s/p CABG, HF (combined HFrEF [EF 25%] and HFpEF [G3DD]) w/ ICD, AFib on Xarelto, severe AS s/p TAVR, COPD on 3-4L home O2, CKD4, HTN, HLD, T2DM, and BPH who presents as a transfer from [facility] for HF evaluation. Ongoing CHF exacerbation now off bumex gtt, new DENISHA on CKD

## 2025-03-06 NOTE — PROGRESS NOTE ADULT - PROBLEM SELECTOR PLAN 1
60 EF 30% with severe tricuspid regurg and PASP 55mmHG on 2/7/24. Dry wt is 243 lbs.   - Diuretic: Bumeg gtt 1mg/hr and metolazone 5 mg PO qd  - GDMT:  Metoprolol succinate 50mg day. Holding entresto and aldactone for DENISHA. Not on SGLT2.  - Hydralazine/Nitrate: hydral 50mg TID. Isordil 20mg TID   - Cardiology following, patient declined RHC and cardiomems

## 2025-03-06 NOTE — PROGRESS NOTE ADULT - SUBJECTIVE AND OBJECTIVE BOX
[facility] Division of Hospital Medicine  Gunner Newton DO  Available via MS Teams    SUBJECTIVE / OVERNIGHT EVENTS:  BM this AM. Denies any chest pain, palpitations, or SOB. UOP improving on  Bumex gtt.     MEDICATIONS  (STANDING):  allopurinol 100 milliGRAM(s) Oral daily  aspirin enteric coated 81 milliGRAM(s) Oral daily  atorvastatin 20 milliGRAM(s) Oral at bedtime  buMETAnide Infusion 1 mG/Hr (5 mL/Hr) IV Continuous <Continuous>  dextrose 5%. 1000 milliLiter(s) (100 mL/Hr) IV Continuous <Continuous>  dextrose 5%. 1000 milliLiter(s) (50 mL/Hr) IV Continuous <Continuous>  dextrose 50% Injectable 25 Gram(s) IV Push once  dextrose 50% Injectable 12.5 Gram(s) IV Push once  dextrose 50% Injectable 25 Gram(s) IV Push once  epoetin lashawn (EPOGEN) Injectable 75325 Unit(s) SubCutaneous every 7 days  fluticasone propionate/ salmeterol 250-50 MICROgram(s) Diskus 1 Dose(s) Inhalation two times a day  folic acid 1 milliGRAM(s) Oral daily  glucagon  Injectable 1 milliGRAM(s) IntraMuscular once  hydrALAZINE 50 milliGRAM(s) Oral three times a day  insulin glargine Injectable (LANTUS) 18 Unit(s) SubCutaneous at bedtime  insulin lispro (ADMELOG) corrective regimen sliding scale   SubCutaneous three times a day before meals  insulin lispro Injectable (ADMELOG) 3 Unit(s) SubCutaneous three times a day before meals  isosorbide   dinitrate Tablet (ISORDIL) 20 milliGRAM(s) Oral three times a day  metolazone 5 milliGRAM(s) Oral daily  metoprolol succinate ER 50 milliGRAM(s) Oral daily  pantoprazole    Tablet 40 milliGRAM(s) Oral every 12 hours  potassium chloride    Tablet ER 10 milliEquivalent(s) Oral daily  psyllium Powder 1 Packet(s) Oral daily  rivaroxaban 15 milliGRAM(s) Oral with dinner  sodium chloride 0.65% Nasal 1 Spray(s) Both Nostrils two times a day  tamsulosin 0.4 milliGRAM(s) Oral at bedtime  tiotropium 2.5 MICROgram(s) Inhaler 2 Puff(s) Inhalation daily    MEDICATIONS  (PRN):  acetaminophen     Tablet .. 650 milliGRAM(s) Oral every 6 hours PRN Temp greater or equal to 38C (100.4F), Mild Pain (1 - 3)  albuterol/ipratropium for Nebulization 3 milliLiter(s) Nebulizer every 6 hours PRN Shortness of Breath and/or Wheezing  dextrose Oral Gel 15 Gram(s) Oral once PRN Blood Glucose LESS THAN 70 milliGRAM(s)/deciliter  dextrose Oral Gel 15 Gram(s) Oral once PRN Blood Glucose LESS THAN 70 milliGRAM(s)/deciliter  melatonin 3 milliGRAM(s) Oral at bedtime PRN Insomnia  polyethylene glycol 3350 17 Gram(s) Oral daily PRN Constipation      I&O's Summary    05 Mar 2025 07:01  -  06 Mar 2025 07:00  --------------------------------------------------------  IN: 600 mL / OUT: 1450 mL / NET: -850 mL    06 Mar 2025 07:01  -  06 Mar 2025 12:26  --------------------------------------------------------  IN: 100 mL / OUT: 540 mL / NET: -440 mL      PHYSICAL EXAM:  Vital Signs Last 24 Hrs  T(C): 36.8 (06 Mar 2025 11:27), Max: 36.9 (05 Mar 2025 23:50)  T(F): 98.3 (06 Mar 2025 11:27), Max: 98.4 (05 Mar 2025 23:50)  HR: 71 (06 Mar 2025 11:27) (69 - 76)  BP: 115/67 (06 Mar 2025 11:27) (112/65 - 124/71)  BP(mean): --  RR: 18 (06 Mar 2025 11:27) (18 - 18)  SpO2: 97% (06 Mar 2025 11:27) (93% - 100%)    Parameters below as of 06 Mar 2025 11:27  Patient On (Oxygen Delivery Method): nasal cannula  O2 Flow (L/min): 3      CONSTITUTIONAL: NAD  EYES: EOMI, conjunctiva and sclera clear  NECK: Supple  RESPIRATORY: Normal respiratory effort; lungs are clear to auscultation bilaterally  CARDIOVASCULAR: Regular rate and rhythm, no murmur, 2-3+ pitting BLE edema to knee  ABDOMEN: Nontender to palpation, +distention  PSYCH: A+O to person, place, and time  NEUROLOGY: no FND    LABS:     03-06    135  |  91[L]  |  104[H]  ----------------------------<  160[H]  3.7   |  29  |  2.87[H]    Ca    9.2      06 Mar 2025 07:03  Mg     2.5     03-05        Urinalysis Basic - ( 06 Mar 2025 07:03 )    Color: x / Appearance: x / SG: x / pH: x  Gluc: 160 mg/dL / Ketone: x  / Bili: x / Urobili: x   Blood: x / Protein: x / Nitrite: x   Leuk Esterase: x / RBC: x / WBC x   Sq Epi: x / Non Sq Epi: x / Bacteria: x        Labs reviewed

## 2025-03-06 NOTE — PROGRESS NOTE ADULT - SUBJECTIVE AND OBJECTIVE BOX
No distress, on NC O2    Vital Signs Last 24 Hrs  T(C): 36.8 (03-06-25 @ 11:27), Max: 36.9 (03-05-25 @ 23:50)  T(F): 98.3 (03-06-25 @ 11:27), Max: 98.4 (03-05-25 @ 23:50)  HR: 71 (03-06-25 @ 11:27) (69 - 75)  BP: 115/67 (03-06-25 @ 11:27) (112/65 - 124/71)  RR: 18 (03-06-25 @ 11:27) (18 - 18)  SpO2: 97% (03-06-25 @ 11:27) (93% - 100%)    I&O's Detail    05 Mar 2025 07:01  -  06 Mar 2025 07:00  --------------------------------------------------------  IN:    Oral Fluid: 600 mL  Total IN: 600 mL    OUT:    Voided (mL): 1450 mL  Total OUT: 1450 mL    06 Mar 2025 07:01  -  06 Mar 2025 14:03  --------------------------------------------------------  OUT:    Voided (mL): 940 mL  Total OUT: 940 mL    s1s2  b/l air entry  soft, ascites   edema                                                            06 Mar 2025 07:03    135    |  91     |  104    ----------------------------<  160    3.7     |  29     |  2.87     Ca    9.2        06 Mar 2025 07:03  Mg     2.5       05 Mar 2025 06:57    acetaminophen     Tablet .. 650 milliGRAM(s) Oral every 6 hours PRN  albuterol/ipratropium for Nebulization 3 milliLiter(s) Nebulizer every 6 hours PRN  allopurinol 100 milliGRAM(s) Oral daily  aspirin enteric coated 81 milliGRAM(s) Oral daily  atorvastatin 20 milliGRAM(s) Oral at bedtime  buMETAnide Infusion 1 mG/Hr IV Continuous <Continuous>  dextrose 5%. 1000 milliLiter(s) IV Continuous <Continuous>  dextrose 5%. 1000 milliLiter(s) IV Continuous <Continuous>  dextrose 50% Injectable 25 Gram(s) IV Push once  dextrose 50% Injectable 12.5 Gram(s) IV Push once  dextrose 50% Injectable 25 Gram(s) IV Push once  dextrose Oral Gel 15 Gram(s) Oral once PRN  dextrose Oral Gel 15 Gram(s) Oral once PRN  epoetin lashawn (EPOGEN) Injectable 91169 Unit(s) SubCutaneous every 7 days  fluticasone propionate/ salmeterol 250-50 MICROgram(s) Diskus 1 Dose(s) Inhalation two times a day  folic acid 1 milliGRAM(s) Oral daily  glucagon  Injectable 1 milliGRAM(s) IntraMuscular once  hydrALAZINE 50 milliGRAM(s) Oral three times a day  insulin glargine Injectable (LANTUS) 18 Unit(s) SubCutaneous at bedtime  insulin lispro (ADMELOG) corrective regimen sliding scale   SubCutaneous three times a day before meals  insulin lispro Injectable (ADMELOG) 3 Unit(s) SubCutaneous three times a day before meals  isosorbide   dinitrate Tablet (ISORDIL) 20 milliGRAM(s) Oral three times a day  melatonin 3 milliGRAM(s) Oral at bedtime PRN  metolazone 5 milliGRAM(s) Oral daily  metoprolol succinate ER 50 milliGRAM(s) Oral daily  pantoprazole    Tablet 40 milliGRAM(s) Oral every 12 hours  polyethylene glycol 3350 17 Gram(s) Oral daily PRN  potassium chloride    Tablet ER 10 milliEquivalent(s) Oral daily  psyllium Powder 1 Packet(s) Oral daily  rivaroxaban 15 milliGRAM(s) Oral with dinner  sodium chloride 0.65% Nasal 1 Spray(s) Both Nostrils two times a day  tamsulosin 0.4 milliGRAM(s) Oral at bedtime  tiotropium 2.5 MICROgram(s) Inhaler 2 Puff(s) Inhalation daily    A/P:    CM, EF ~ 30%, severe TR  Adm to [facility] VS 1/10 w/fluid overload   Tx to [facility] 2/5 for further management   Known CKD 3, (baseline Cr ~ 2. - 2.5)  Hemodynamic/cardio-renal DENISHA/CKD   Diuresis per HF team  F/u BMP, Mg, UO  Avoid nephrotoxins as possible   Overall options are limited and prognosis is guarded   D/w pt and family again today, their wish is for pt not to be on HD if/when became necessary  Will follow     [phone]

## 2025-03-06 NOTE — PROGRESS NOTE ADULT - SUBJECTIVE AND OBJECTIVE BOX
ADVANCED HEART FAILURE & TRANSPLANT  - PROGRESS NOTE  *To reach the NS2 Team from 8am to 5pm (MON-FRI), please call [phone].   _______________________________________________________________________________________________________    Subjective:    Medications:  acetaminophen     Tablet .. 650 milliGRAM(s) Oral every 6 hours PRN  albuterol/ipratropium for Nebulization 3 milliLiter(s) Nebulizer every 6 hours PRN  allopurinol 100 milliGRAM(s) Oral daily  aspirin enteric coated 81 milliGRAM(s) Oral daily  atorvastatin 20 milliGRAM(s) Oral at bedtime  buMETAnide Infusion 1 mG/Hr IV Continuous <Continuous>  dextrose 5%. 1000 milliLiter(s) IV Continuous <Continuous>  dextrose 5%. 1000 milliLiter(s) IV Continuous <Continuous>  dextrose 50% Injectable 25 Gram(s) IV Push once  dextrose 50% Injectable 12.5 Gram(s) IV Push once  dextrose 50% Injectable 25 Gram(s) IV Push once  dextrose Oral Gel 15 Gram(s) Oral once PRN  dextrose Oral Gel 15 Gram(s) Oral once PRN  epoetin lashawn (EPOGEN) Injectable 52089 Unit(s) SubCutaneous every 7 days  fluticasone propionate/ salmeterol 250-50 MICROgram(s) Diskus 1 Dose(s) Inhalation two times a day  folic acid 1 milliGRAM(s) Oral daily  glucagon  Injectable 1 milliGRAM(s) IntraMuscular once  hydrALAZINE 50 milliGRAM(s) Oral three times a day  insulin glargine Injectable (LANTUS) 18 Unit(s) SubCutaneous at bedtime  insulin lispro (ADMELOG) corrective regimen sliding scale   SubCutaneous three times a day before meals  insulin lispro Injectable (ADMELOG) 3 Unit(s) SubCutaneous three times a day before meals  isosorbide   dinitrate Tablet (ISORDIL) 20 milliGRAM(s) Oral three times a day  melatonin 3 milliGRAM(s) Oral at bedtime PRN  metolazone 5 milliGRAM(s) Oral daily  metoprolol succinate ER 50 milliGRAM(s) Oral daily  pantoprazole    Tablet 40 milliGRAM(s) Oral every 12 hours  potassium chloride    Tablet ER 10 milliEquivalent(s) Oral daily  psyllium Powder 1 Packet(s) Oral daily  rivaroxaban 15 milliGRAM(s) Oral with dinner  sodium chloride 0.65% Nasal 1 Spray(s) Both Nostrils two times a day  tamsulosin 0.4 milliGRAM(s) Oral at bedtime  tiotropium 2.5 MICROgram(s) Inhaler 2 Puff(s) Inhalation daily      Physical Exam:    Vitals:  Vital Signs Last 24 Hours  T(C): 36.7 (25 @ 04:11), Max: 36.9 (25 @ 23:50)  HR: 69 (25 @ 04:11) (67 - 76)  BP: 123/69 (25 @ 04:11) (114/65 - 124/71)  RR: 18 (25 @ 04:11) (18 - 18)  SpO2: 99% (25 @ 04:11) (88% - 100%)    Weight in k.2 ( @ 04:11)    I&O's Summary    05 Mar 2025 07:01  -  06 Mar 2025 07:00  --------------------------------------------------------  IN: 600 mL / OUT: 1450 mL / NET: -850 mL        Tele:    General: No distress. Comfortable.  HEENT: EOM intact.  Neck: Neck supple. JVP not elevated. No masses  Chest: Clear to auscultation bilaterally  CV: Normal S1 and S2. No murmurs, rub, or gallops. Radial pulses normal.  Abdomen: Soft, non-distended, non-tender  Skin: No rashes or skin breakdown  Extremities: No LE edema  Neurology: Alert and oriented times three. Sensation intact  Psych: Affect normal    Labs:        135  |  91[L]  |  104[H]  ----------------------------<  160[H]  3.7   |  29  |  2.87[H]    Ca    9.2      06 Mar 2025 07:03  Mg     2.5     03-05                     ADVANCED HEART FAILURE & TRANSPLANT  - PROGRESS NOTE  *To reach the NS2 Team from 8am to 5pm (MON-FRI), please call [phone].   _______________________________________________________________________________________________________    Subjective:  - NAEO  - States he feels better    Medications:  acetaminophen     Tablet .. 650 milliGRAM(s) Oral every 6 hours PRN  albuterol/ipratropium for Nebulization 3 milliLiter(s) Nebulizer every 6 hours PRN  allopurinol 100 milliGRAM(s) Oral daily  aspirin enteric coated 81 milliGRAM(s) Oral daily  atorvastatin 20 milliGRAM(s) Oral at bedtime  buMETAnide Infusion 1 mG/Hr IV Continuous <Continuous>  dextrose 5%. 1000 milliLiter(s) IV Continuous <Continuous>  dextrose 5%. 1000 milliLiter(s) IV Continuous <Continuous>  dextrose 50% Injectable 25 Gram(s) IV Push once  dextrose 50% Injectable 12.5 Gram(s) IV Push once  dextrose 50% Injectable 25 Gram(s) IV Push once  dextrose Oral Gel 15 Gram(s) Oral once PRN  dextrose Oral Gel 15 Gram(s) Oral once PRN  epoetin lashawn (EPOGEN) Injectable 26382 Unit(s) SubCutaneous every 7 days  fluticasone propionate/ salmeterol 250-50 MICROgram(s) Diskus 1 Dose(s) Inhalation two times a day  folic acid 1 milliGRAM(s) Oral daily  glucagon  Injectable 1 milliGRAM(s) IntraMuscular once  hydrALAZINE 50 milliGRAM(s) Oral three times a day  insulin glargine Injectable (LANTUS) 18 Unit(s) SubCutaneous at bedtime  insulin lispro (ADMELOG) corrective regimen sliding scale   SubCutaneous three times a day before meals  insulin lispro Injectable (ADMELOG) 3 Unit(s) SubCutaneous three times a day before meals  isosorbide   dinitrate Tablet (ISORDIL) 20 milliGRAM(s) Oral three times a day  melatonin 3 milliGRAM(s) Oral at bedtime PRN  metolazone 5 milliGRAM(s) Oral daily  metoprolol succinate ER 50 milliGRAM(s) Oral daily  pantoprazole    Tablet 40 milliGRAM(s) Oral every 12 hours  potassium chloride    Tablet ER 10 milliEquivalent(s) Oral daily  psyllium Powder 1 Packet(s) Oral daily  rivaroxaban 15 milliGRAM(s) Oral with dinner  sodium chloride 0.65% Nasal 1 Spray(s) Both Nostrils two times a day  tamsulosin 0.4 milliGRAM(s) Oral at bedtime  tiotropium 2.5 MICROgram(s) Inhaler 2 Puff(s) Inhalation daily      Physical Exam:    Vitals:  Vital Signs Last 24 Hours  T(C): 36.7 (25 @ 04:11), Max: 36.9 (25 @ 23:50)  HR: 69 (25 @ 04:11) (67 - 76)  BP: 123/69 (25 @ 04:11) (114/65 - 124/71)  RR: 18 (25 @ 04:11) (18 - 18)  SpO2: 99% (25 @ 04:11) (88% - 100%)    Weight in k.2 ( @ 04:11)    I&O's Summary    05 Mar 2025 07:01  -  06 Mar 2025 07:00  --------------------------------------------------------  IN: 600 mL / OUT: 1450 mL / NET: -850 mL    Tele: paced 70's    General: No distress. Comfortable.  HEENT: EOM intact.  Neck: Neck supple. JVP not elevated. No masses  Chest: Clear to auscultation bilaterally  CV: Normal S1 and S2. No murmurs, rub, or gallops. Radial pulses normal, warm peripherally  Abdomen: distened  Skin: No rashes or skin breakdown  Extremities: +1 BLE  Neurology: Alert and oriented times three. Sensation intact  Psych: Affect normal    Labs:    03-06    135  |  91[L]  |  104[H]  ----------------------------<  160[H]  3.7   |  29  |  2.87[H]    Ca    9.2      06 Mar 2025 07:03  Mg     2.5     03-05

## 2025-03-07 LAB
ALBUMIN SERPL ELPH-MCNC: 3.3 G/DL — SIGNIFICANT CHANGE UP (ref 3.3–5)
ALP SERPL-CCNC: 225 U/L — HIGH (ref 40–120)
ALT FLD-CCNC: 14 U/L — SIGNIFICANT CHANGE UP (ref 10–45)
ANION GAP SERPL CALC-SCNC: 16 MMOL/L — SIGNIFICANT CHANGE UP (ref 5–17)
AST SERPL-CCNC: 29 U/L — SIGNIFICANT CHANGE UP (ref 10–40)
BILIRUB SERPL-MCNC: 0.6 MG/DL — SIGNIFICANT CHANGE UP (ref 0.2–1.2)
BUN SERPL-MCNC: 104 MG/DL — HIGH (ref 7–23)
CALCIUM SERPL-MCNC: 8.9 MG/DL — SIGNIFICANT CHANGE UP (ref 8.4–10.5)
CHLORIDE SERPL-SCNC: 90 MMOL/L — LOW (ref 96–108)
CO2 SERPL-SCNC: 28 MMOL/L — SIGNIFICANT CHANGE UP (ref 22–31)
CREAT SERPL-MCNC: 2.83 MG/DL — HIGH (ref 0.5–1.3)
EGFR: 22 ML/MIN/1.73M2 — LOW
EGFR: 22 ML/MIN/1.73M2 — LOW
GLUCOSE BLDC GLUCOMTR-MCNC: 209 MG/DL — HIGH (ref 70–99)
GLUCOSE BLDC GLUCOMTR-MCNC: 242 MG/DL — HIGH (ref 70–99)
GLUCOSE BLDC GLUCOMTR-MCNC: 256 MG/DL — HIGH (ref 70–99)
GLUCOSE BLDC GLUCOMTR-MCNC: 267 MG/DL — HIGH (ref 70–99)
GLUCOSE SERPL-MCNC: 150 MG/DL — HIGH (ref 70–99)
HCT VFR BLD CALC: 27.5 % — LOW (ref 39–50)
HGB BLD-MCNC: 8.1 G/DL — LOW (ref 13–17)
MAGNESIUM SERPL-MCNC: 2.4 MG/DL — SIGNIFICANT CHANGE UP (ref 1.6–2.6)
MCHC RBC-ENTMCNC: 25.6 PG — LOW (ref 27–34)
MCHC RBC-ENTMCNC: 29.5 G/DL — LOW (ref 32–36)
MCV RBC AUTO: 87 FL — SIGNIFICANT CHANGE UP (ref 80–100)
NRBC BLD AUTO-RTO: 0 /100 WBCS — SIGNIFICANT CHANGE UP (ref 0–0)
PLATELET # BLD AUTO: 183 K/UL — SIGNIFICANT CHANGE UP (ref 150–400)
POTASSIUM SERPL-MCNC: 3.8 MMOL/L — SIGNIFICANT CHANGE UP (ref 3.5–5.3)
POTASSIUM SERPL-SCNC: 3.8 MMOL/L — SIGNIFICANT CHANGE UP (ref 3.5–5.3)
PROT SERPL-MCNC: 7 G/DL — SIGNIFICANT CHANGE UP (ref 6–8.3)
RBC # BLD: 3.16 M/UL — LOW (ref 4.2–5.8)
RBC # FLD: 21.1 % — HIGH (ref 10.3–14.5)
SODIUM SERPL-SCNC: 134 MMOL/L — LOW (ref 135–145)
WBC # BLD: 11.73 K/UL — HIGH (ref 3.8–10.5)
WBC # FLD AUTO: 11.73 K/UL — HIGH (ref 3.8–10.5)

## 2025-03-07 PROCEDURE — 99232 SBSQ HOSP IP/OBS MODERATE 35: CPT

## 2025-03-07 PROCEDURE — 99233 SBSQ HOSP IP/OBS HIGH 50: CPT

## 2025-03-07 RX ADMIN — BUMETANIDE 10 MG/HR: 1 TABLET ORAL at 21:56

## 2025-03-07 RX ADMIN — Medication 1 DOSE(S): at 18:16

## 2025-03-07 RX ADMIN — INSULIN LISPRO 3 UNIT(S): 100 INJECTION, SOLUTION INTRAVENOUS; SUBCUTANEOUS at 13:20

## 2025-03-07 RX ADMIN — Medication 81 MILLIGRAM(S): at 12:38

## 2025-03-07 RX ADMIN — INSULIN LISPRO 3 UNIT(S): 100 INJECTION, SOLUTION INTRAVENOUS; SUBCUTANEOUS at 08:54

## 2025-03-07 RX ADMIN — Medication 10 MILLIEQUIVALENT(S): at 12:38

## 2025-03-07 RX ADMIN — Medication 40 MILLIGRAM(S): at 05:59

## 2025-03-07 RX ADMIN — INSULIN GLARGINE-YFGN 18 UNIT(S): 100 INJECTION, SOLUTION SUBCUTANEOUS at 21:56

## 2025-03-07 RX ADMIN — Medication 50 MILLIGRAM(S): at 13:19

## 2025-03-07 RX ADMIN — TAMSULOSIN HYDROCHLORIDE 0.4 MILLIGRAM(S): 0.4 CAPSULE ORAL at 21:56

## 2025-03-07 RX ADMIN — ISOSORBDIE DINITRATE 20 MILLIGRAM(S): 30 TABLET ORAL at 17:59

## 2025-03-07 RX ADMIN — Medication 1 SPRAY(S): at 18:04

## 2025-03-07 RX ADMIN — INSULIN LISPRO 4: 100 INJECTION, SOLUTION INTRAVENOUS; SUBCUTANEOUS at 13:19

## 2025-03-07 RX ADMIN — IPRATROPIUM BROMIDE AND ALBUTEROL SULFATE 3 MILLILITER(S): .5; 2.5 SOLUTION RESPIRATORY (INHALATION) at 09:49

## 2025-03-07 RX ADMIN — INSULIN LISPRO 6: 100 INJECTION, SOLUTION INTRAVENOUS; SUBCUTANEOUS at 17:59

## 2025-03-07 RX ADMIN — Medication 50 MILLIGRAM(S): at 21:56

## 2025-03-07 RX ADMIN — Medication 100 MILLIGRAM(S): at 12:45

## 2025-03-07 RX ADMIN — BUMETANIDE 10 MG/HR: 1 TABLET ORAL at 15:40

## 2025-03-07 RX ADMIN — ATORVASTATIN CALCIUM 20 MILLIGRAM(S): 80 TABLET, FILM COATED ORAL at 21:57

## 2025-03-07 RX ADMIN — INSULIN LISPRO 3 UNIT(S): 100 INJECTION, SOLUTION INTRAVENOUS; SUBCUTANEOUS at 18:00

## 2025-03-07 RX ADMIN — FOLIC ACID 1 MILLIGRAM(S): 1 TABLET ORAL at 12:38

## 2025-03-07 RX ADMIN — TIOTROPIUM BROMIDE INHALATION SPRAY 2 PUFF(S): 3.12 SPRAY, METERED RESPIRATORY (INHALATION) at 12:39

## 2025-03-07 RX ADMIN — Medication 1 PACKET(S): at 12:38

## 2025-03-07 RX ADMIN — Medication 40 MILLIGRAM(S): at 17:59

## 2025-03-07 RX ADMIN — ISOSORBDIE DINITRATE 20 MILLIGRAM(S): 30 TABLET ORAL at 12:38

## 2025-03-07 RX ADMIN — INSULIN LISPRO 4: 100 INJECTION, SOLUTION INTRAVENOUS; SUBCUTANEOUS at 08:53

## 2025-03-07 RX ADMIN — Medication 50 MILLIGRAM(S): at 05:58

## 2025-03-07 RX ADMIN — METOPROLOL SUCCINATE 50 MILLIGRAM(S): 50 TABLET, EXTENDED RELEASE ORAL at 08:52

## 2025-03-07 RX ADMIN — RIVAROXABAN 15 MILLIGRAM(S): 10 TABLET, FILM COATED ORAL at 17:59

## 2025-03-07 RX ADMIN — ISOSORBDIE DINITRATE 20 MILLIGRAM(S): 30 TABLET ORAL at 05:58

## 2025-03-07 NOTE — PROGRESS NOTE ADULT - SUBJECTIVE AND OBJECTIVE BOX
ADVANCED HEART FAILURE & TRANSPLANT  - PROGRESS NOTE  *To reach the NS2 Team from 8am to 5pm (MON-FRI), please call [phone].   _______________________________________________________________________________________________________    Subjective:    Medications:  acetaminophen     Tablet .. 650 milliGRAM(s) Oral every 6 hours PRN  albuterol/ipratropium for Nebulization 3 milliLiter(s) Nebulizer every 6 hours PRN  allopurinol 100 milliGRAM(s) Oral daily  aspirin enteric coated 81 milliGRAM(s) Oral daily  atorvastatin 20 milliGRAM(s) Oral at bedtime  buMETAnide Infusion 1 mG/Hr IV Continuous <Continuous>  dextrose 5%. 1000 milliLiter(s) IV Continuous <Continuous>  dextrose 5%. 1000 milliLiter(s) IV Continuous <Continuous>  dextrose 50% Injectable 25 Gram(s) IV Push once  dextrose 50% Injectable 12.5 Gram(s) IV Push once  dextrose 50% Injectable 25 Gram(s) IV Push once  dextrose Oral Gel 15 Gram(s) Oral once PRN  dextrose Oral Gel 15 Gram(s) Oral once PRN  epoetin lashawn (EPOGEN) Injectable 96886 Unit(s) SubCutaneous every 7 days  fluticasone propionate/ salmeterol 250-50 MICROgram(s) Diskus 1 Dose(s) Inhalation two times a day  folic acid 1 milliGRAM(s) Oral daily  glucagon  Injectable 1 milliGRAM(s) IntraMuscular once  hydrALAZINE 50 milliGRAM(s) Oral three times a day  insulin glargine Injectable (LANTUS) 18 Unit(s) SubCutaneous at bedtime  insulin lispro (ADMELOG) corrective regimen sliding scale   SubCutaneous three times a day before meals  insulin lispro Injectable (ADMELOG) 3 Unit(s) SubCutaneous three times a day before meals  isosorbide   dinitrate Tablet (ISORDIL) 20 milliGRAM(s) Oral three times a day  melatonin 3 milliGRAM(s) Oral at bedtime PRN  metolazone 5 milliGRAM(s) Oral daily  metoprolol succinate ER 50 milliGRAM(s) Oral daily  pantoprazole    Tablet 40 milliGRAM(s) Oral every 12 hours  polyethylene glycol 3350 17 Gram(s) Oral daily PRN  potassium chloride    Tablet ER 10 milliEquivalent(s) Oral daily  psyllium Powder 1 Packet(s) Oral daily  rivaroxaban 15 milliGRAM(s) Oral with dinner  sodium chloride 0.65% Nasal 1 Spray(s) Both Nostrils two times a day  tamsulosin 0.4 milliGRAM(s) Oral at bedtime  tiotropium 2.5 MICROgram(s) Inhaler 2 Puff(s) Inhalation daily      Physical Exam:    Vitals:  Vital Signs Last 24 Hours  T(C): 36.7 (03-07-25 @ 04:34), Max: 36.8 (03-06-25 @ 11:27)  HR: 66 (03-07-25 @ 04:34) (66 - 75)  BP: 103/54 (03-07-25 @ 04:34) (103/54 - 116/66)  RR: 18 (03-07-25 @ 04:34) (18 - 18)  SpO2: 98% (03-07-25 @ 04:34) (93% - 98%)        I&O's Summary    06 Mar 2025 07:01  -  07 Mar 2025 07:00  --------------------------------------------------------  IN: 100 mL / OUT: 1240 mL / NET: -1140 mL        Tele:    General: No distress. Comfortable.  HEENT: EOM intact.  Neck: Neck supple. JVP not elevated. No masses  Chest: Clear to auscultation bilaterally  CV: Normal S1 and S2. No murmurs, rub, or gallops. Radial pulses normal.  Abdomen: Soft, non-distended, non-tender  Skin: No rashes or skin breakdown  Extremities: No LE edema  Neurology: Alert and oriented times three. Sensation intact  Psych: Affect normal    Labs:                        8.1    11.73 )-----------( 183      ( 07 Mar 2025 07:14 )             27.5     03-07    134[L]  |  90[L]  |  104[H]  ----------------------------<  150[H]  3.8   |  28  |  2.83[H]    Ca    8.9      07 Mar 2025 07:14  Mg     2.4     03-07    TPro  7.0  /  Alb  3.3  /  TBili  0.6  /  DBili  x   /  AST  29  /  ALT  14  /  AlkPhos  225[H]  03-07                   ADVANCED HEART FAILURE & TRANSPLANT  - PROGRESS NOTE  *To reach the NS2 Team from 8am to 5pm (MON-FRI), please call [phone].   _______________________________________________________________________________________________________    Subjective:  - NAEO  - Seen laying flat in bed  - States he still feel "tight" in both legs and abdomen      Medications:  acetaminophen     Tablet .. 650 milliGRAM(s) Oral every 6 hours PRN  albuterol/ipratropium for Nebulization 3 milliLiter(s) Nebulizer every 6 hours PRN  allopurinol 100 milliGRAM(s) Oral daily  aspirin enteric coated 81 milliGRAM(s) Oral daily  atorvastatin 20 milliGRAM(s) Oral at bedtime  buMETAnide Infusion 1 mG/Hr IV Continuous <Continuous>  dextrose 5%. 1000 milliLiter(s) IV Continuous <Continuous>  dextrose 5%. 1000 milliLiter(s) IV Continuous <Continuous>  dextrose 50% Injectable 25 Gram(s) IV Push once  dextrose 50% Injectable 12.5 Gram(s) IV Push once  dextrose 50% Injectable 25 Gram(s) IV Push once  dextrose Oral Gel 15 Gram(s) Oral once PRN  dextrose Oral Gel 15 Gram(s) Oral once PRN  epoetin lashawn (EPOGEN) Injectable 26977 Unit(s) SubCutaneous every 7 days  fluticasone propionate/ salmeterol 250-50 MICROgram(s) Diskus 1 Dose(s) Inhalation two times a day  folic acid 1 milliGRAM(s) Oral daily  glucagon  Injectable 1 milliGRAM(s) IntraMuscular once  hydrALAZINE 50 milliGRAM(s) Oral three times a day  insulin glargine Injectable (LANTUS) 18 Unit(s) SubCutaneous at bedtime  insulin lispro (ADMELOG) corrective regimen sliding scale   SubCutaneous three times a day before meals  insulin lispro Injectable (ADMELOG) 3 Unit(s) SubCutaneous three times a day before meals  isosorbide   dinitrate Tablet (ISORDIL) 20 milliGRAM(s) Oral three times a day  melatonin 3 milliGRAM(s) Oral at bedtime PRN  metolazone 5 milliGRAM(s) Oral daily  metoprolol succinate ER 50 milliGRAM(s) Oral daily  pantoprazole    Tablet 40 milliGRAM(s) Oral every 12 hours  polyethylene glycol 3350 17 Gram(s) Oral daily PRN  potassium chloride    Tablet ER 10 milliEquivalent(s) Oral daily  psyllium Powder 1 Packet(s) Oral daily  rivaroxaban 15 milliGRAM(s) Oral with dinner  sodium chloride 0.65% Nasal 1 Spray(s) Both Nostrils two times a day  tamsulosin 0.4 milliGRAM(s) Oral at bedtime  tiotropium 2.5 MICROgram(s) Inhaler 2 Puff(s) Inhalation daily      Physical Exam:    Vitals:  Vital Signs Last 24 Hours  T(C): 36.7 (03-07-25 @ 04:34), Max: 36.8 (03-06-25 @ 11:27)  HR: 66 (03-07-25 @ 04:34) (66 - 75)  BP: 103/54 (03-07-25 @ 04:34) (103/54 - 116/66)  RR: 18 (03-07-25 @ 04:34) (18 - 18)  SpO2: 98% (03-07-25 @ 04:34) (93% - 98%)    I&O's Summary    06 Mar 2025 07:01  -  07 Mar 2025 07:00  --------------------------------------------------------  IN: 100 mL / OUT: 1240 mL / NET: -1140 mL      General: No distress. Comfortable.  HEENT: EOM intact.  Neck: JVP difficult to assess   Chest: Clear to auscultation bilaterally  CV: Normal S1 and S2. No murmurs, rub, or gallops. Radial pulses normal, warm peripherally   Abdomen: Distended, +bowel sounds  Skin: No rashes or skin breakdown  Extremities: +1-2 edema to BLE  Neurology: Alert and oriented times three. Sensation intact  Psych: Affect normal    Labs:                        8.1    11.73 )-----------( 183      ( 07 Mar 2025 07:14 )             27.5     03-07    134[L]  |  90[L]  |  104[H]  ----------------------------<  150[H]  3.8   |  28  |  2.83[H]    Ca    8.9      07 Mar 2025 07:14  Mg     2.4     03-07    TPro  7.0  /  Alb  3.3  /  TBili  0.6  /  DBili  x   /  AST  29  /  ALT  14  /  AlkPhos  225[H]  03-07

## 2025-03-07 NOTE — PROGRESS NOTE ADULT - ASSESSMENT
80 y/o male w/ PMHx CAD s/p CABG, HF (combined HFrEF [EF 25%] and HFpEF [G3DD]) w/ ICD, AFib on Xarelto, severe AS s/p TAVR, COPD on 3-4L home O2, CKD4, HTN, HLD, T2DM, and BPH who presents as a transfer from [facility] for HF evaluation. Ongoing CHF exacerbation now off bumex gtt, new DENISHA on CKD

## 2025-03-07 NOTE — PROGRESS NOTE ADULT - ASSESSMENT
80 y/o male w/ PMHx CAD s/p CABG, HFrEF (LVIDd 5.2, LVEF 25-30%) w/ DC- ICD, AFib (Xarelto), severe AS s/p TAVR, COPD on 3-4L home O2, CKD3, HTN, HLD, T2DM, and BPH presented to [facility] for ADHF, despite escalation of home diuretics. Now transferred to [facility] for refractory volume overload. His labs notable for stable Scr (though unclear baseline).  Repeat TTE shows severe BiV dysfunction. s/p RRT for hypotension given 500cc bolus on 2/11    GOC clarified and he remains DNR/DNI per wife - wants the ICD on. Bumex escalated to continuos infusion but remains volume expanded.     Cardiac Studies  TTE 2/7/25 LVIDd 5.3cm, LVEF 30%, no LV thrombus, TAPSE 1.0cm, severely enlarged RV size/RVSF reduced, mod dilated LA, severe dilated RA, TAVR present, mod MR, severe TR, PASP 52, PASP 52, no pericardial effusion, IVC dilated 2.35cm  TTE 8/8/24; LVIDd 5.2, EF 25-30%, global hypokinesis, severe grade III DD, TASPE 1.1, TAVR present in AV position, mld-mod MR, mod TR, PASP 53

## 2025-03-07 NOTE — PROGRESS NOTE ADULT - SUBJECTIVE AND OBJECTIVE BOX
[facility] Division of Hospital Medicine  Gunner Newton DO  Available via MS Teams    SUBJECTIVE / OVERNIGHT EVENTS:  BM X 1 this morning. Denies any complaints at this time.     MEDICATIONS  (STANDING):  allopurinol 100 milliGRAM(s) Oral daily  aspirin enteric coated 81 milliGRAM(s) Oral daily  atorvastatin 20 milliGRAM(s) Oral at bedtime  buMETAnide Infusion 1 mG/Hr (5 mL/Hr) IV Continuous <Continuous>  dextrose 5%. 1000 milliLiter(s) (50 mL/Hr) IV Continuous <Continuous>  dextrose 5%. 1000 milliLiter(s) (100 mL/Hr) IV Continuous <Continuous>  dextrose 50% Injectable 25 Gram(s) IV Push once  dextrose 50% Injectable 12.5 Gram(s) IV Push once  dextrose 50% Injectable 25 Gram(s) IV Push once  epoetin lashawn (EPOGEN) Injectable 01238 Unit(s) SubCutaneous every 7 days  fluticasone propionate/ salmeterol 250-50 MICROgram(s) Diskus 1 Dose(s) Inhalation two times a day  folic acid 1 milliGRAM(s) Oral daily  glucagon  Injectable 1 milliGRAM(s) IntraMuscular once  hydrALAZINE 50 milliGRAM(s) Oral three times a day  insulin glargine Injectable (LANTUS) 18 Unit(s) SubCutaneous at bedtime  insulin lispro (ADMELOG) corrective regimen sliding scale   SubCutaneous three times a day before meals  insulin lispro Injectable (ADMELOG) 3 Unit(s) SubCutaneous three times a day before meals  isosorbide   dinitrate Tablet (ISORDIL) 20 milliGRAM(s) Oral three times a day  metolazone 5 milliGRAM(s) Oral daily  metoprolol succinate ER 50 milliGRAM(s) Oral daily  pantoprazole    Tablet 40 milliGRAM(s) Oral every 12 hours  potassium chloride    Tablet ER 10 milliEquivalent(s) Oral daily  psyllium Powder 1 Packet(s) Oral daily  rivaroxaban 15 milliGRAM(s) Oral with dinner  sodium chloride 0.65% Nasal 1 Spray(s) Both Nostrils two times a day  tamsulosin 0.4 milliGRAM(s) Oral at bedtime  tiotropium 2.5 MICROgram(s) Inhaler 2 Puff(s) Inhalation daily    MEDICATIONS  (PRN):  acetaminophen     Tablet .. 650 milliGRAM(s) Oral every 6 hours PRN Temp greater or equal to 38C (100.4F), Mild Pain (1 - 3)  albuterol/ipratropium for Nebulization 3 milliLiter(s) Nebulizer every 6 hours PRN Shortness of Breath and/or Wheezing  dextrose Oral Gel 15 Gram(s) Oral once PRN Blood Glucose LESS THAN 70 milliGRAM(s)/deciliter  dextrose Oral Gel 15 Gram(s) Oral once PRN Blood Glucose LESS THAN 70 milliGRAM(s)/deciliter  melatonin 3 milliGRAM(s) Oral at bedtime PRN Insomnia  polyethylene glycol 3350 17 Gram(s) Oral daily PRN Constipation      I&O's Summary    06 Mar 2025 07:01  -  07 Mar 2025 07:00  --------------------------------------------------------  IN: 100 mL / OUT: 1240 mL / NET: -1140 mL    07 Mar 2025 07:01  -  07 Mar 2025 12:28  --------------------------------------------------------  IN: 100 mL / OUT: 0 mL / NET: 100 mL        PHYSICAL EXAM:  Vital Signs Last 24 Hrs  T(C): 36.9 (07 Mar 2025 11:26), Max: 36.9 (07 Mar 2025 11:26)  T(F): 98.4 (07 Mar 2025 11:26), Max: 98.4 (07 Mar 2025 11:26)  HR: 70 (07 Mar 2025 11:26) (66 - 73)  BP: 118/70 (07 Mar 2025 11:26) (103/54 - 118/70)  BP(mean): --  RR: 18 (07 Mar 2025 11:26) (18 - 18)  SpO2: 98% (07 Mar 2025 11:26) (93% - 98%)    Parameters below as of 07 Mar 2025 11:26  Patient On (Oxygen Delivery Method): nasal cannula  O2 Flow (L/min): 3      CONSTITUTIONAL: NAD  EYES: EOMI, conjunctiva and sclera clear  NECK: Supple  RESPIRATORY: Normal respiratory effort; lungs are clear to auscultation bilaterally  CARDIOVASCULAR: Regular rate and rhythm, no murmur, 2+ pitting BLE edema to knee  ABDOMEN: Nontender to palpation, +distention  PSYCH: A+O to person, place, and time  NEUROLOGY: no FND    LABS:                         8.1    11.73 )-----------( 183      ( 07 Mar 2025 07:14 )             27.5     03-07    134[L]  |  90[L]  |  104[H]  ----------------------------<  150[H]  3.8   |  28  |  2.83[H]    Ca    8.9      07 Mar 2025 07:14  Mg     2.4     03-07    TPro  7.0  /  Alb  3.3  /  TBili  0.6  /  DBili  x   /  AST  29  /  ALT  14  /  AlkPhos  225[H]  03-07      Urinalysis Basic - ( 07 Mar 2025 07:14 )    Color: x / Appearance: x / SG: x / pH: x  Gluc: 150 mg/dL / Ketone: x  / Bili: x / Urobili: x   Blood: x / Protein: x / Nitrite: x   Leuk Esterase: x / RBC: x / WBC x   Sq Epi: x / Non Sq Epi: x / Bacteria: x      Labs reviewed

## 2025-03-07 NOTE — PROGRESS NOTE ADULT - PROBLEM SELECTOR PLAN 1
Etiology likely ischemic. Primary cardiologist Dr. Jewel Stubbs ([facility]), last TTE LVEF 25-30%. Appears has been on some GDMT as outpt (?Farxiga, Coreg and previously on ARNI though unclear why stopped)  - Toprol XL 50mg QD  - Continue Hydral to 50mg TID and Isordil 20mg TID; Hold for SBP <90  - hold Entresto and dino for DENISHA  - Increased Bumex to 2mg/hr today  - Has ICD in place; Currently a DNR/DNI: trial NIV. Discussed with palliative and at this time wants ICD on as per wife  - Aggressive PT  - standing weights documented does not appear accurate  - Strict I/O's  - Target electrolyte repletion to target K+ >4.0 and Mg >2.0.  - likely not a good candidate for MEMs

## 2025-03-07 NOTE — PROGRESS NOTE ADULT - SUBJECTIVE AND OBJECTIVE BOX
No distress, on NC O2    Vital Signs Last 24 Hrs  T(C): 36.9 (03-07-25 @ 11:26), Max: 36.9 (03-07-25 @ 11:26)  T(F): 98.4 (03-07-25 @ 11:26), Max: 98.4 (03-07-25 @ 11:26)  HR: 70 (03-07-25 @ 11:26) (66 - 73)  BP: 118/70 (03-07-25 @ 11:26) (103/54 - 118/70)  RR: 18 (03-07-25 @ 11:26) (18 - 18)  SpO2: 98% (03-07-25 @ 11:26) (93% - 98%)    I&O's Detail    06 Mar 2025 07:01  -  07 Mar 2025 07:00  --------------------------------------------------------  OUT:    Voided (mL): 1240 mL  Total OUT: 1240 mL    07 Mar 2025 07:01  -  07 Mar 2025 16:45  --------------------------------------------------------  OUT:    Voided (mL): 400 mL  Total OUT: 400 mL    s1s2  b/l air entry  soft, ascites   edema                                                                                8.1    11.73 )-----------( 183      ( 07 Mar 2025 07:14 )             27.5     07 Mar 2025 07:14    134    |  90     |  104    ----------------------------<  150    3.8     |  28     |  2.83     Ca    8.9        07 Mar 2025 07:14  Mg     2.4       07 Mar 2025 07:14    TPro  7.0    /  Alb  3.3    /  TBili  0.6    /  DBili  x      /  AST  29     /  ALT  14     /  AlkPhos  225    07 Mar 2025 07:14    LIVER FUNCTIONS - ( 07 Mar 2025 07:14 )  Alb: 3.3 g/dL / Pro: 7.0 g/dL / ALK PHOS: 225 U/L / ALT: 14 U/L / AST: 29 U/L / GGT: x           acetaminophen     Tablet .. 650 milliGRAM(s) Oral every 6 hours PRN  albuterol/ipratropium for Nebulization 3 milliLiter(s) Nebulizer every 6 hours PRN  allopurinol 100 milliGRAM(s) Oral daily  aspirin enteric coated 81 milliGRAM(s) Oral daily  atorvastatin 20 milliGRAM(s) Oral at bedtime  buMETAnide Infusion 2 mG/Hr IV Continuous <Continuous>  dextrose 5%. 1000 milliLiter(s) IV Continuous <Continuous>  dextrose 5%. 1000 milliLiter(s) IV Continuous <Continuous>  dextrose 50% Injectable 25 Gram(s) IV Push once  dextrose 50% Injectable 12.5 Gram(s) IV Push once  dextrose 50% Injectable 25 Gram(s) IV Push once  dextrose Oral Gel 15 Gram(s) Oral once PRN  dextrose Oral Gel 15 Gram(s) Oral once PRN  epoetin lashawn (EPOGEN) Injectable 08792 Unit(s) SubCutaneous every 7 days  fluticasone propionate/ salmeterol 250-50 MICROgram(s) Diskus 1 Dose(s) Inhalation two times a day  folic acid 1 milliGRAM(s) Oral daily  glucagon  Injectable 1 milliGRAM(s) IntraMuscular once  hydrALAZINE 50 milliGRAM(s) Oral three times a day  insulin glargine Injectable (LANTUS) 18 Unit(s) SubCutaneous at bedtime  insulin lispro (ADMELOG) corrective regimen sliding scale   SubCutaneous three times a day before meals  insulin lispro Injectable (ADMELOG) 3 Unit(s) SubCutaneous three times a day before meals  isosorbide   dinitrate Tablet (ISORDIL) 20 milliGRAM(s) Oral three times a day  melatonin 3 milliGRAM(s) Oral at bedtime PRN  metolazone 5 milliGRAM(s) Oral daily  metoprolol succinate ER 50 milliGRAM(s) Oral daily  pantoprazole    Tablet 40 milliGRAM(s) Oral every 12 hours  polyethylene glycol 3350 17 Gram(s) Oral daily PRN  potassium chloride    Tablet ER 10 milliEquivalent(s) Oral daily  psyllium Powder 1 Packet(s) Oral daily  rivaroxaban 15 milliGRAM(s) Oral with dinner  sodium chloride 0.65% Nasal 1 Spray(s) Both Nostrils two times a day  tamsulosin 0.4 milliGRAM(s) Oral at bedtime  tiotropium 2.5 MICROgram(s) Inhaler 2 Puff(s) Inhalation daily    A/P:    CM, EF ~ 30%, severe TR  Adm to [facility] VS 1/10 w/fluid overload   Tx to [facility] 2/5 for further management   Known CKD 3, (baseline Cr ~ 2. - 2.5)  Hemodynamic/cardio-renal DENISHA/CKD   Diuresis per HF team  F/u BMP, Mg, UO  Avoid nephrotoxins as possible   No HD if/when became necessary, per pt and family wishes     [phone]

## 2025-03-07 NOTE — PROGRESS NOTE ADULT - PROBLEM SELECTOR PLAN 1
EF 30% with severe tricuspid regurg and PASP 55mmHG on 2/7/24. Dry wt is 243 lbs.   - Diuretic: Bumeg gtt 1mg/hr and metolazone 5 mg PO qd  - GDMT:  Metoprolol succinate 50mg day. Holding entresto and aldactone for DENISHA. Not on SGLT2.  - Hydralazine/Nitrate: hydral 50mg TID. Isordil 20mg TID   - Cardiology following, patient declined RHC and cardiomems

## 2025-03-08 LAB
ALBUMIN SERPL ELPH-MCNC: 3.6 G/DL — SIGNIFICANT CHANGE UP (ref 3.3–5)
ALP SERPL-CCNC: 242 U/L — HIGH (ref 40–120)
ALT FLD-CCNC: 13 U/L — SIGNIFICANT CHANGE UP (ref 10–45)
ANION GAP SERPL CALC-SCNC: 17 MMOL/L — SIGNIFICANT CHANGE UP (ref 5–17)
AST SERPL-CCNC: 23 U/L — SIGNIFICANT CHANGE UP (ref 10–40)
BILIRUB SERPL-MCNC: 0.6 MG/DL — SIGNIFICANT CHANGE UP (ref 0.2–1.2)
BUN SERPL-MCNC: 95 MG/DL — HIGH (ref 7–23)
CALCIUM SERPL-MCNC: 9.3 MG/DL — SIGNIFICANT CHANGE UP (ref 8.4–10.5)
CHLORIDE SERPL-SCNC: 87 MMOL/L — LOW (ref 96–108)
CO2 SERPL-SCNC: 32 MMOL/L — HIGH (ref 22–31)
CREAT SERPL-MCNC: 3.18 MG/DL — HIGH (ref 0.5–1.3)
EGFR: 19 ML/MIN/1.73M2 — LOW
EGFR: 19 ML/MIN/1.73M2 — LOW
GLUCOSE BLDC GLUCOMTR-MCNC: 183 MG/DL — HIGH (ref 70–99)
GLUCOSE BLDC GLUCOMTR-MCNC: 199 MG/DL — HIGH (ref 70–99)
GLUCOSE BLDC GLUCOMTR-MCNC: 236 MG/DL — HIGH (ref 70–99)
GLUCOSE BLDC GLUCOMTR-MCNC: 257 MG/DL — HIGH (ref 70–99)
GLUCOSE SERPL-MCNC: 191 MG/DL — HIGH (ref 70–99)
MAGNESIUM SERPL-MCNC: 2.5 MG/DL — SIGNIFICANT CHANGE UP (ref 1.6–2.6)
POTASSIUM SERPL-MCNC: 3.4 MMOL/L — LOW (ref 3.5–5.3)
POTASSIUM SERPL-SCNC: 3.4 MMOL/L — LOW (ref 3.5–5.3)
PROT SERPL-MCNC: 7.3 G/DL — SIGNIFICANT CHANGE UP (ref 6–8.3)
SODIUM SERPL-SCNC: 136 MMOL/L — SIGNIFICANT CHANGE UP (ref 135–145)

## 2025-03-08 PROCEDURE — 99233 SBSQ HOSP IP/OBS HIGH 50: CPT

## 2025-03-08 RX ADMIN — INSULIN LISPRO 3 UNIT(S): 100 INJECTION, SOLUTION INTRAVENOUS; SUBCUTANEOUS at 12:52

## 2025-03-08 RX ADMIN — Medication 40 MILLIGRAM(S): at 17:58

## 2025-03-08 RX ADMIN — BUMETANIDE 10 MG/HR: 1 TABLET ORAL at 03:00

## 2025-03-08 RX ADMIN — ATORVASTATIN CALCIUM 20 MILLIGRAM(S): 80 TABLET, FILM COATED ORAL at 21:04

## 2025-03-08 RX ADMIN — Medication 50 MILLIGRAM(S): at 21:04

## 2025-03-08 RX ADMIN — TAMSULOSIN HYDROCHLORIDE 0.4 MILLIGRAM(S): 0.4 CAPSULE ORAL at 21:04

## 2025-03-08 RX ADMIN — INSULIN GLARGINE-YFGN 18 UNIT(S): 100 INJECTION, SOLUTION SUBCUTANEOUS at 21:50

## 2025-03-08 RX ADMIN — METOPROLOL SUCCINATE 50 MILLIGRAM(S): 50 TABLET, EXTENDED RELEASE ORAL at 09:40

## 2025-03-08 RX ADMIN — Medication 1 SPRAY(S): at 17:58

## 2025-03-08 RX ADMIN — Medication 50 MILLIGRAM(S): at 13:54

## 2025-03-08 RX ADMIN — Medication 100 MILLIGRAM(S): at 11:26

## 2025-03-08 RX ADMIN — Medication 1 DOSE(S): at 05:39

## 2025-03-08 RX ADMIN — INSULIN LISPRO 4: 100 INJECTION, SOLUTION INTRAVENOUS; SUBCUTANEOUS at 08:52

## 2025-03-08 RX ADMIN — Medication 10 MILLIEQUIVALENT(S): at 11:26

## 2025-03-08 RX ADMIN — Medication 1 SPRAY(S): at 05:39

## 2025-03-08 RX ADMIN — Medication 40 MILLIGRAM(S): at 05:40

## 2025-03-08 RX ADMIN — BUMETANIDE 10 MG/HR: 1 TABLET ORAL at 13:47

## 2025-03-08 RX ADMIN — Medication 40 MILLIEQUIVALENT(S): at 09:40

## 2025-03-08 RX ADMIN — INSULIN LISPRO 3 UNIT(S): 100 INJECTION, SOLUTION INTRAVENOUS; SUBCUTANEOUS at 17:59

## 2025-03-08 RX ADMIN — IPRATROPIUM BROMIDE AND ALBUTEROL SULFATE 3 MILLILITER(S): .5; 2.5 SOLUTION RESPIRATORY (INHALATION) at 14:19

## 2025-03-08 RX ADMIN — TIOTROPIUM BROMIDE INHALATION SPRAY 2 PUFF(S): 3.12 SPRAY, METERED RESPIRATORY (INHALATION) at 11:25

## 2025-03-08 RX ADMIN — RIVAROXABAN 15 MILLIGRAM(S): 10 TABLET, FILM COATED ORAL at 17:57

## 2025-03-08 RX ADMIN — Medication 50 MILLIGRAM(S): at 05:39

## 2025-03-08 RX ADMIN — ISOSORBDIE DINITRATE 20 MILLIGRAM(S): 30 TABLET ORAL at 05:40

## 2025-03-08 RX ADMIN — INSULIN LISPRO 3 UNIT(S): 100 INJECTION, SOLUTION INTRAVENOUS; SUBCUTANEOUS at 08:53

## 2025-03-08 RX ADMIN — ISOSORBDIE DINITRATE 20 MILLIGRAM(S): 30 TABLET ORAL at 17:58

## 2025-03-08 RX ADMIN — Medication 81 MILLIGRAM(S): at 11:26

## 2025-03-08 RX ADMIN — INSULIN LISPRO 2: 100 INJECTION, SOLUTION INTRAVENOUS; SUBCUTANEOUS at 12:52

## 2025-03-08 RX ADMIN — FOLIC ACID 1 MILLIGRAM(S): 1 TABLET ORAL at 11:25

## 2025-03-08 RX ADMIN — ISOSORBDIE DINITRATE 20 MILLIGRAM(S): 30 TABLET ORAL at 11:25

## 2025-03-08 RX ADMIN — INSULIN LISPRO 2: 100 INJECTION, SOLUTION INTRAVENOUS; SUBCUTANEOUS at 17:58

## 2025-03-08 RX ADMIN — Medication 1 DOSE(S): at 17:58

## 2025-03-08 NOTE — PROGRESS NOTE ADULT - PROBLEM SELECTOR PLAN 1
EF 30% with severe tricuspid regurg and PASP 55mmHG on 2/7/24. Dry wt is 243 lbs.   -noted bump in creatine and bicarbonate 3/8.   -nop weight last 48 hour, replaced daily stanidng weight order.  -will conitnue bumex @ 2 3/8. If worsening bicarb and/or creatinine would bring down diuretics given minimal UOP, and concern for contraction alkalosis.   - GDMT:  Metoprolol succinate 50mg day. Holding entresto and aldactone for DENISHA. Not on SGLT2.  - Hydralazine/Nitrate: hydral 50mg TID. Isordil 20mg TID   - Cardiology following, patient declined RHC and cardiomems

## 2025-03-08 NOTE — PROGRESS NOTE ADULT - SUBJECTIVE AND OBJECTIVE BOX
[facility] Division of Hospital Medicine  Roque Escobar DO  Available via MS Teams    SUBJECTIVE / OVERNIGHT EVENTS:  States no complaints. SOB is stable, no pain.    I&O's Summary    07 Mar 2025 07:01  -  08 Mar 2025 07:00  --------------------------------------------------------  IN: 820 mL / OUT: 1150 mL / NET: -330 mL        PHYSICAL EXAM:  Vital Signs Last 24 Hrs  T(C): 36.4 (08 Mar 2025 09:19), Max: 36.8 (08 Mar 2025 04:25)  T(F): 97.5 (08 Mar 2025 09:19), Max: 98.3 (08 Mar 2025 04:25)  HR: 67 (08 Mar 2025 11:20) (67 - 85)  BP: 117/67 (08 Mar 2025 11:20) (106/60 - 127/72)  BP(mean): --  RR: 19 (08 Mar 2025 11:20) (18 - 19)  SpO2: 98% (08 Mar 2025 11:20) (97% - 100%)    Parameters below as of 08 Mar 2025 11:20  Patient On (Oxygen Delivery Method): nasal cannula  O2 Flow (L/min): 4    Parameters below as of 06 Mar 2025 11:27  Patient On (Oxygen Delivery Method): nasal cannula  O2 Flow (L/min): 3      CONSTITUTIONAL: NAD  EYES: EOMI, conjunctiva and sclera clear  NECK: Supple. +JVP  RESPIRATORY: Normal respiratory effort; lungs are clear to auscultation bilaterally  CARDIOVASCULAR: Regular rate and rhythm, no murmur, 2-3+ pitting BLE edema to knee  ABDOMEN: Nontender to palpation, +distention  PSYCH: A+O to person, place, and time    LABS:     0                      8.1    11.73 )-----------( 183      ( 07 Mar 2025 07:14 )             27.5   03-08    136  |  87[L]  |  95[H]  ----------------------------<  191[H]  3.4[L]   |  32[H]  |  3.18[H]    Ca    9.3      08 Mar 2025 06:35  Mg     2.5     03-08    TPro  7.3  /  Alb  3.6  /  TBili  0.6  /  DBili  x   /  AST  23  /  ALT  13  /  AlkPhos  242[H]  03-08      Urinalysis Basic - ( 06 Mar 2025 07:03 )    Color: x / Appearance: x / SG: x / pH: x  Gluc: 160 mg/dL / Ketone: x  / Bili: x / Urobili: x   Blood: x / Protein: x / Nitrite: x   Leuk Esterase: x / RBC: x / WBC x   Sq Epi: x / Non Sq Epi: x / Bacteria: x        Labs reviewed

## 2025-03-08 NOTE — PROGRESS NOTE ADULT - ASSESSMENT
78 y/o male w/ PMHx CAD s/p CABG, HF (combined HFrEF [EF 25%] and HFpEF [G3DD]) w/ ICD, AFib on Xarelto, severe AS s/p TAVR, COPD on 3-4L home O2, CKD4, HTN, HLD, T2DM, and BPH who presents as a transfer from [facility] for HF evaluation. Ongoing CHF exacerbation now off bumex gtt, new DENISHA on CKD

## 2025-03-08 NOTE — PROGRESS NOTE ADULT - SUBJECTIVE AND OBJECTIVE BOX
No distress, on NC O2    Vital Signs Last 24 Hrs  T(C): 36.4 (03-08-25 @ 09:19), Max: 36.8 (03-08-25 @ 04:25)  T(F): 97.5 (03-08-25 @ 09:19), Max: 98.3 (03-08-25 @ 04:25)  HR: 67 (03-08-25 @ 11:20) (67 - 85)  BP: 117/67 (03-08-25 @ 11:20) (106/60 - 127/72)  RR: 19 (03-08-25 @ 11:20) (18 - 19)  SpO2: 98% (03-08-25 @ 11:20) (97% - 100%)    I&O's Detail    07 Mar 2025 07:01  -  08 Mar 2025 07:00  --------------------------------------------------------  IN:    Bumetanide: 120 mL    Oral Fluid: 700 mL  Total IN: 820 mL    OUT:    Voided (mL): 1150 mL  Total OUT: 1150 mL    s1s2  b/l air entry  soft, ascites   edema                                                                                        8.1    11.73 )-----------( 183      ( 07 Mar 2025 07:14 )             27.5     08 Mar 2025 06:35    136    |  87     |  95     ----------------------------<  191    3.4     |  32     |  3.18     Ca    9.3        08 Mar 2025 06:35  Mg     2.5       08 Mar 2025 06:35    TPro  7.3    /  Alb  3.6    /  TBili  0.6    /  DBili  x      /  AST  23     /  ALT  13     /  AlkPhos  242    08 Mar 2025 06:35    LIVER FUNCTIONS - ( 08 Mar 2025 06:35 )  Alb: 3.6 g/dL / Pro: 7.3 g/dL / ALK PHOS: 242 U/L / ALT: 13 U/L / AST: 23 U/L / GGT: x           acetaminophen     Tablet .. 650 milliGRAM(s) Oral every 6 hours PRN  albuterol/ipratropium for Nebulization 3 milliLiter(s) Nebulizer every 6 hours PRN  allopurinol 100 milliGRAM(s) Oral daily  aspirin enteric coated 81 milliGRAM(s) Oral daily  atorvastatin 20 milliGRAM(s) Oral at bedtime  buMETAnide Infusion 2 mG/Hr IV Continuous <Continuous>  dextrose 5%. 1000 milliLiter(s) IV Continuous <Continuous>  dextrose 5%. 1000 milliLiter(s) IV Continuous <Continuous>  dextrose 50% Injectable 25 Gram(s) IV Push once  dextrose 50% Injectable 12.5 Gram(s) IV Push once  dextrose 50% Injectable 25 Gram(s) IV Push once  dextrose Oral Gel 15 Gram(s) Oral once PRN  dextrose Oral Gel 15 Gram(s) Oral once PRN  epoetin lashawn (EPOGEN) Injectable 04765 Unit(s) SubCutaneous every 7 days  fluticasone propionate/ salmeterol 250-50 MICROgram(s) Diskus 1 Dose(s) Inhalation two times a day  folic acid 1 milliGRAM(s) Oral daily  glucagon  Injectable 1 milliGRAM(s) IntraMuscular once  hydrALAZINE 50 milliGRAM(s) Oral three times a day  insulin glargine Injectable (LANTUS) 18 Unit(s) SubCutaneous at bedtime  insulin lispro (ADMELOG) corrective regimen sliding scale   SubCutaneous three times a day before meals  insulin lispro Injectable (ADMELOG) 3 Unit(s) SubCutaneous three times a day before meals  isosorbide   dinitrate Tablet (ISORDIL) 20 milliGRAM(s) Oral three times a day  melatonin 3 milliGRAM(s) Oral at bedtime PRN  metoprolol succinate ER 50 milliGRAM(s) Oral daily  pantoprazole    Tablet 40 milliGRAM(s) Oral every 12 hours  polyethylene glycol 3350 17 Gram(s) Oral daily PRN  potassium chloride    Tablet ER 10 milliEquivalent(s) Oral daily  psyllium Powder 1 Packet(s) Oral daily  rivaroxaban 15 milliGRAM(s) Oral with dinner  sodium chloride 0.65% Nasal 1 Spray(s) Both Nostrils two times a day  tamsulosin 0.4 milliGRAM(s) Oral at bedtime  tiotropium 2.5 MICROgram(s) Inhaler 2 Puff(s) Inhalation daily    A/P:    CM, EF ~ 30%, severe TR  Adm to [facility] VS 1/10 w/fluid overload   Tx to [facility] 2/5 for further management   Known CKD 3, (baseline Cr ~ 2. - 2.5)  Hemodynamic/cardio-renal DENISHA/CKD   Would hold Metolazone  Continue Bumex qtt  F/u BMP, Mg, UO  Avoid nephrotoxins as possible   Prognosis is guarded   No HD if/when became necessary, per d/w pt and family     [phone]

## 2025-03-09 LAB
ALBUMIN SERPL ELPH-MCNC: 3.6 G/DL — SIGNIFICANT CHANGE UP (ref 3.3–5)
ALP SERPL-CCNC: 223 U/L — HIGH (ref 40–120)
ALT FLD-CCNC: 13 U/L — SIGNIFICANT CHANGE UP (ref 10–45)
ANION GAP SERPL CALC-SCNC: 17 MMOL/L — SIGNIFICANT CHANGE UP (ref 5–17)
AST SERPL-CCNC: 21 U/L — SIGNIFICANT CHANGE UP (ref 10–40)
BASE EXCESS BLDA CALC-SCNC: 12.7 MMOL/L — HIGH (ref -2–3)
BILIRUB SERPL-MCNC: 0.6 MG/DL — SIGNIFICANT CHANGE UP (ref 0.2–1.2)
BUN SERPL-MCNC: 96 MG/DL — HIGH (ref 7–23)
CALCIUM SERPL-MCNC: 9.4 MG/DL — SIGNIFICANT CHANGE UP (ref 8.4–10.5)
CHLORIDE SERPL-SCNC: 85 MMOL/L — LOW (ref 96–108)
CO2 BLDA-SCNC: 40 MMOL/L — HIGH (ref 19–24)
CO2 SERPL-SCNC: 32 MMOL/L — HIGH (ref 22–31)
CREAT SERPL-MCNC: 3.04 MG/DL — HIGH (ref 0.5–1.3)
EGFR: 20 ML/MIN/1.73M2 — LOW
EGFR: 20 ML/MIN/1.73M2 — LOW
GLUCOSE BLDC GLUCOMTR-MCNC: 145 MG/DL — HIGH (ref 70–99)
GLUCOSE BLDC GLUCOMTR-MCNC: 167 MG/DL — HIGH (ref 70–99)
GLUCOSE BLDC GLUCOMTR-MCNC: 190 MG/DL — HIGH (ref 70–99)
GLUCOSE BLDC GLUCOMTR-MCNC: 216 MG/DL — HIGH (ref 70–99)
GLUCOSE SERPL-MCNC: 182 MG/DL — HIGH (ref 70–99)
HCO3 BLDA-SCNC: 38 MMOL/L — HIGH (ref 21–28)
HOROWITZ INDEX BLDA+IHG-RTO: 36 — SIGNIFICANT CHANGE UP
PCO2 BLDA: 50 MMHG — HIGH (ref 35–48)
PH BLDA: 7.49 — HIGH (ref 7.35–7.45)
PO2 BLDA: 80 MMHG — LOW (ref 83–108)
POTASSIUM SERPL-MCNC: 3 MMOL/L — LOW (ref 3.5–5.3)
POTASSIUM SERPL-SCNC: 3 MMOL/L — LOW (ref 3.5–5.3)
PROT SERPL-MCNC: 7.5 G/DL — SIGNIFICANT CHANGE UP (ref 6–8.3)
SAO2 % BLDA: 97.7 % — SIGNIFICANT CHANGE UP (ref 94–98)
SODIUM SERPL-SCNC: 134 MMOL/L — LOW (ref 135–145)

## 2025-03-09 PROCEDURE — 74018 RADEX ABDOMEN 1 VIEW: CPT | Mod: 26

## 2025-03-09 PROCEDURE — 71045 X-RAY EXAM CHEST 1 VIEW: CPT | Mod: 26

## 2025-03-09 PROCEDURE — 99233 SBSQ HOSP IP/OBS HIGH 50: CPT

## 2025-03-09 RX ORDER — BISACODYL 5 MG
5 TABLET, DELAYED RELEASE (ENTERIC COATED) ORAL EVERY 12 HOURS
Refills: 0 | Status: DISCONTINUED | OUTPATIENT
Start: 2025-03-09 | End: 2025-04-14

## 2025-03-09 RX ORDER — BISACODYL 5 MG
10 TABLET, DELAYED RELEASE (ENTERIC COATED) ORAL DAILY
Refills: 0 | Status: DISCONTINUED | OUTPATIENT
Start: 2025-03-09 | End: 2025-03-09

## 2025-03-09 RX ADMIN — BUMETANIDE 10 MG/HR: 1 TABLET ORAL at 09:57

## 2025-03-09 RX ADMIN — Medication 40 MILLIGRAM(S): at 17:45

## 2025-03-09 RX ADMIN — Medication 1 DOSE(S): at 05:23

## 2025-03-09 RX ADMIN — Medication 81 MILLIGRAM(S): at 11:26

## 2025-03-09 RX ADMIN — Medication 50 MILLIGRAM(S): at 13:32

## 2025-03-09 RX ADMIN — INSULIN LISPRO 4: 100 INJECTION, SOLUTION INTRAVENOUS; SUBCUTANEOUS at 08:56

## 2025-03-09 RX ADMIN — Medication 1 DOSE(S): at 17:45

## 2025-03-09 RX ADMIN — RIVAROXABAN 15 MILLIGRAM(S): 10 TABLET, FILM COATED ORAL at 17:44

## 2025-03-09 RX ADMIN — TAMSULOSIN HYDROCHLORIDE 0.4 MILLIGRAM(S): 0.4 CAPSULE ORAL at 21:30

## 2025-03-09 RX ADMIN — FOLIC ACID 1 MILLIGRAM(S): 1 TABLET ORAL at 11:26

## 2025-03-09 RX ADMIN — Medication 40 MILLIGRAM(S): at 05:22

## 2025-03-09 RX ADMIN — TIOTROPIUM BROMIDE INHALATION SPRAY 2 PUFF(S): 3.12 SPRAY, METERED RESPIRATORY (INHALATION) at 11:27

## 2025-03-09 RX ADMIN — Medication 10 MILLIEQUIVALENT(S): at 11:26

## 2025-03-09 RX ADMIN — IPRATROPIUM BROMIDE AND ALBUTEROL SULFATE 3 MILLILITER(S): .5; 2.5 SOLUTION RESPIRATORY (INHALATION) at 16:06

## 2025-03-09 RX ADMIN — Medication 1 SPRAY(S): at 05:23

## 2025-03-09 RX ADMIN — INSULIN LISPRO 3 UNIT(S): 100 INJECTION, SOLUTION INTRAVENOUS; SUBCUTANEOUS at 12:49

## 2025-03-09 RX ADMIN — METOPROLOL SUCCINATE 50 MILLIGRAM(S): 50 TABLET, EXTENDED RELEASE ORAL at 08:55

## 2025-03-09 RX ADMIN — ATORVASTATIN CALCIUM 20 MILLIGRAM(S): 80 TABLET, FILM COATED ORAL at 21:29

## 2025-03-09 RX ADMIN — INSULIN LISPRO 2: 100 INJECTION, SOLUTION INTRAVENOUS; SUBCUTANEOUS at 12:49

## 2025-03-09 RX ADMIN — Medication 20 MILLIEQUIVALENT(S): at 15:54

## 2025-03-09 RX ADMIN — Medication 5 MILLIGRAM(S): at 18:22

## 2025-03-09 RX ADMIN — Medication 1 SPRAY(S): at 17:45

## 2025-03-09 RX ADMIN — ISOSORBDIE DINITRATE 20 MILLIGRAM(S): 30 TABLET ORAL at 05:22

## 2025-03-09 RX ADMIN — Medication 20 MILLIEQUIVALENT(S): at 17:44

## 2025-03-09 RX ADMIN — POLYETHYLENE GLYCOL 3350 17 GRAM(S): 17 POWDER, FOR SOLUTION ORAL at 10:00

## 2025-03-09 RX ADMIN — INSULIN LISPRO 3 UNIT(S): 100 INJECTION, SOLUTION INTRAVENOUS; SUBCUTANEOUS at 17:44

## 2025-03-09 RX ADMIN — Medication 100 MILLIGRAM(S): at 11:26

## 2025-03-09 RX ADMIN — Medication 50 MILLIGRAM(S): at 21:29

## 2025-03-09 RX ADMIN — ISOSORBDIE DINITRATE 20 MILLIGRAM(S): 30 TABLET ORAL at 16:25

## 2025-03-09 RX ADMIN — IPRATROPIUM BROMIDE AND ALBUTEROL SULFATE 3 MILLILITER(S): .5; 2.5 SOLUTION RESPIRATORY (INHALATION) at 09:58

## 2025-03-09 RX ADMIN — Medication 1 PACKET(S): at 11:26

## 2025-03-09 RX ADMIN — INSULIN LISPRO 3 UNIT(S): 100 INJECTION, SOLUTION INTRAVENOUS; SUBCUTANEOUS at 08:56

## 2025-03-09 RX ADMIN — INSULIN LISPRO 2: 100 INJECTION, SOLUTION INTRAVENOUS; SUBCUTANEOUS at 17:43

## 2025-03-09 RX ADMIN — INSULIN GLARGINE-YFGN 18 UNIT(S): 100 INJECTION, SOLUTION SUBCUTANEOUS at 21:11

## 2025-03-09 RX ADMIN — BUMETANIDE 10 MG/HR: 1 TABLET ORAL at 18:22

## 2025-03-09 RX ADMIN — Medication 50 MILLIGRAM(S): at 05:22

## 2025-03-09 RX ADMIN — ISOSORBDIE DINITRATE 20 MILLIGRAM(S): 30 TABLET ORAL at 11:27

## 2025-03-09 NOTE — PROGRESS NOTE ADULT - ASSESSMENT
78 y/o male w/ PMHx CAD s/p CABG, HF (combined HFrEF [EF 25%] and HFpEF [G3DD]) w/ ICD, AFib on Xarelto, severe AS s/p TAVR, COPD on 3-4L home O2, CKD4, HTN, HLD, T2DM, and BPH who presents as a transfer from [facility] for HF evaluation. Ongoing CHF exacerbation now off bumex gtt, new DENISHA on CKD

## 2025-03-09 NOTE — CHART NOTE - NSCHARTNOTEFT_GEN_A_CORE
78 y/o male w/ PMHx CAD s/p CABG, HF (combined HFrEF [EF 25%] and HFpEF [G3DD]) w/ ICD, AFib on Xarelto, severe AS s/p TAVR, COPD on 3-4L home O2, CKD4, HTN, HLD, T2DM, and BPH who presents as a transfer from [facility] for HF evaluation.      Dx:    	Acute on Chronic HFrEF (EF < 40%); ICD in place -s/p Bumex gtt, on IV bumex 2 mg BID with HTS. c/w Toprol XL, Isordil, hydralazine; hold entresto for DENISHA  		---- 2/7 PMT X 9 sec. AM,            	DENISHA superimposed on CKD: i/s/o Cardiorenal Syndrome with contracture alkalosis, s/p diamox. Renal following            	LUE DVT: Non occlusive thrombus within brachial & cephalic veins; repeat duplex 2/7 w/ resolution of thrombus, Cont. Xarelto        	CAD - s/p CABG - ASA on hold d/t melena requiring transfusion          	Chronic AFib - Torpol, Xarelto        	COPD/Chronic Hypoxic Resp. Failure  98% 3L NC (home O2)         	Anemia - melena while home i/s/o NSAID use. Per GI at [facility], PPI BID. no endoscopy yet d/t respiratory issues  	Epistaxis 2/27 - afrin x3 days       	HTN - Ramipril on hold d/t DENISHA        	HLD - Atorvastatin         	DM - Lantus/ISS         	BPH - Tamsulosin; 2/12 UA Neg.   	3/5;  Seen by HF.  Pt placed on Bumex gtt at 1mg/hr along with   	metolazon 5 mg daily. 80 y/o male w/ PMHx CAD s/p CABG, HF (combined HFrEF [EF 25%] and HFpEF [G3DD]) w/ ICD, AFib on Xarelto, severe AS s/p TAVR, COPD on 3-4L home O2, CKD4, HTN, HLD, T2DM, and BPH who presents as a transfer from [facility] for HF evaluation.    Called by RN pt is  very short of breath when he was returned from the bed to the chair.  SPO2 desaturating to 85%.  Abdomen is grossly distended.   As per the nurse, pt had very small bm x 2 days.   Gen: 80 y/o morbidly obese pt.   Skin: Acyanotic, cool and dry.    Resp: CTA however, very diminished in bilateral bases.   Breathing also compromised 2/2 distended abdomen while in the bed.   Abdomen: Round, distended. hypoactive bowel sounds present.   Extremities: (+) Pitting edema of bilateral lower extremities extending to bilateral hips.       Impression:  Chronic Systolic HF.   DENISHA  LUE DVT  Chronic Afib.     Plan:   Obtain CXR.   Obtain Abdominal Xray to eval for constipation.   Obtain ABG.   Continue oxygen supplement.   Continue to monitor SPO2.    Continue bumex gtt.  Pt continue to be fluid overloaded.       Laurita Gordon DNP, ANP-BC  Department of Medicine.

## 2025-03-09 NOTE — PROGRESS NOTE ADULT - SUBJECTIVE AND OBJECTIVE BOX
Contreras Brooks MD  Division of Hospital Medicine  Available on MS teams until 7pm  If no response or off-hours, page [phone]  -------------------------------------    Patient is a 79y old  Male who presents with a chief complaint of HF eval (08 Mar 2025 14:49)      SUBJECTIVE / OVERNIGHT EVENTS: none acute  ADDITIONAL REVIEW OF SYSTEMS: pt feels ok, wife hoping to transition off drip tomorrow- states pt was responding well to PO lasix 80mg po bid but states he was missing doses.     MEDICATIONS  (STANDING):  allopurinol 100 milliGRAM(s) Oral daily  aspirin enteric coated 81 milliGRAM(s) Oral daily  atorvastatin 20 milliGRAM(s) Oral at bedtime  buMETAnide Infusion 2 mG/Hr (10 mL/Hr) IV Continuous <Continuous>  dextrose 5%. 1000 milliLiter(s) (50 mL/Hr) IV Continuous <Continuous>  dextrose 5%. 1000 milliLiter(s) (100 mL/Hr) IV Continuous <Continuous>  dextrose 50% Injectable 25 Gram(s) IV Push once  dextrose 50% Injectable 12.5 Gram(s) IV Push once  dextrose 50% Injectable 25 Gram(s) IV Push once  epoetin lashawn (EPOGEN) Injectable 87002 Unit(s) SubCutaneous every 7 days  fluticasone propionate/ salmeterol 250-50 MICROgram(s) Diskus 1 Dose(s) Inhalation two times a day  folic acid 1 milliGRAM(s) Oral daily  glucagon  Injectable 1 milliGRAM(s) IntraMuscular once  hydrALAZINE 50 milliGRAM(s) Oral three times a day  insulin glargine Injectable (LANTUS) 18 Unit(s) SubCutaneous at bedtime  insulin lispro (ADMELOG) corrective regimen sliding scale   SubCutaneous three times a day before meals  insulin lispro Injectable (ADMELOG) 3 Unit(s) SubCutaneous three times a day before meals  isosorbide   dinitrate Tablet (ISORDIL) 20 milliGRAM(s) Oral three times a day  metoprolol succinate ER 50 milliGRAM(s) Oral daily  pantoprazole    Tablet 40 milliGRAM(s) Oral every 12 hours  potassium chloride    Tablet ER 20 milliEquivalent(s) Oral every 2 hours  potassium chloride    Tablet ER 10 milliEquivalent(s) Oral daily  psyllium Powder 1 Packet(s) Oral daily  rivaroxaban 15 milliGRAM(s) Oral with dinner  sodium chloride 0.65% Nasal 1 Spray(s) Both Nostrils two times a day  tamsulosin 0.4 milliGRAM(s) Oral at bedtime  tiotropium 2.5 MICROgram(s) Inhaler 2 Puff(s) Inhalation daily    MEDICATIONS  (PRN):  acetaminophen     Tablet .. 650 milliGRAM(s) Oral every 6 hours PRN Temp greater or equal to 38C (100.4F), Mild Pain (1 - 3)  albuterol/ipratropium for Nebulization 3 milliLiter(s) Nebulizer every 6 hours PRN Shortness of Breath and/or Wheezing  dextrose Oral Gel 15 Gram(s) Oral once PRN Blood Glucose LESS THAN 70 milliGRAM(s)/deciliter  dextrose Oral Gel 15 Gram(s) Oral once PRN Blood Glucose LESS THAN 70 milliGRAM(s)/deciliter  melatonin 3 milliGRAM(s) Oral at bedtime PRN Insomnia  polyethylene glycol 3350 17 Gram(s) Oral daily PRN Constipation      CAPILLARY BLOOD GLUCOSE      POCT Blood Glucose.: 190 mg/dL (09 Mar 2025 12:25)  POCT Blood Glucose.: 216 mg/dL (09 Mar 2025 08:37)  POCT Blood Glucose.: 257 mg/dL (08 Mar 2025 21:35)  POCT Blood Glucose.: 183 mg/dL (08 Mar 2025 17:16)    I&O's Summary    08 Mar 2025 06:01  -  09 Mar 2025 07:00  --------------------------------------------------------  IN: 1286 mL / OUT: 1650 mL / NET: -364 mL        PHYSICAL EXAM:  Vital Signs Last 24 Hrs  T(C): 36.4 (09 Mar 2025 11:16), Max: 36.7 (09 Mar 2025 04:51)  T(F): 97.6 (09 Mar 2025 11:16), Max: 98.1 (09 Mar 2025 04:51)  HR: 71 (09 Mar 2025 13:33) (67 - 76)  BP: 120/71 (09 Mar 2025 13:33) (113/64 - 120/71)  BP(mean): --  RR: 18 (09 Mar 2025 11:16) (18 - 18)  SpO2: 98% (09 Mar 2025 11:16) (95% - 99%)    Parameters below as of 09 Mar 2025 11:16  Patient On (Oxygen Delivery Method): nasal cannula  O2 Flow (L/min): 3    CONSTITUTIONAL: NAD  EYES: PERRLA; conjunctiva and sclera clear  ENMT: MMM  NECK: Supple  RESPIRATORY: Normal respiratory effort; CTAB  CARDIOVASCULAR: RRR, no JVD, no peripheral edema   ABDOMEN: Nontender to palpation, normoactive BS, no guarding/rigidity  MUSCLOSKELETAL:  no clubbing/cyanosis, no joint swelling or tenderness to palpation  PSYCH: A+O x 3, affect normal  NEUROLOGY: CN 2-12 are intact and symmetric; no gross sensory or motor deficits  SKIN: No rashes; no palpable lesions    LABS:    03-09    134[L]  |  85[L]  |  96[H]  ----------------------------<  182[H]  3.0[L]   |  32[H]  |  3.04[H]    Ca    9.4      09 Mar 2025 12:21  Mg     2.5     03-08    TPro  7.5  /  Alb  3.6  /  TBili  0.6  /  DBili  x   /  AST  21  /  ALT  13  /  AlkPhos  223[H]  03-09          Urinalysis Basic - ( 09 Mar 2025 12:21 )    Color: x / Appearance: x / SG: x / pH: x  Gluc: 182 mg/dL / Ketone: x  / Bili: x / Urobili: x   Blood: x / Protein: x / Nitrite: x   Leuk Esterase: x / RBC: x / WBC x   Sq Epi: x / Non Sq Epi: x / Bacteria: x          RADIOLOGY & ADDITIONAL TESTS:  Results Reviewed:   Imaging Personally Reviewed:  Electrocardiogram Personally Reviewed:    COORDINATION OF CARE:  Care Discussed with Consultants/Other Providers [Y/N]:  Prior or Outpatient Records Reviewed [Y/N]:

## 2025-03-10 LAB
ANION GAP SERPL CALC-SCNC: 15 MMOL/L — SIGNIFICANT CHANGE UP (ref 5–17)
BUN SERPL-MCNC: 105 MG/DL — HIGH (ref 7–23)
CALCIUM SERPL-MCNC: 8.9 MG/DL — SIGNIFICANT CHANGE UP (ref 8.4–10.5)
CHLORIDE SERPL-SCNC: 85 MMOL/L — LOW (ref 96–108)
CO2 SERPL-SCNC: 32 MMOL/L — HIGH (ref 22–31)
CREAT SERPL-MCNC: 2.97 MG/DL — HIGH (ref 0.5–1.3)
EGFR: 20 ML/MIN/1.73M2 — LOW
EGFR: 20 ML/MIN/1.73M2 — LOW
GLUCOSE BLDC GLUCOMTR-MCNC: 108 MG/DL — HIGH (ref 70–99)
GLUCOSE BLDC GLUCOMTR-MCNC: 137 MG/DL — HIGH (ref 70–99)
GLUCOSE BLDC GLUCOMTR-MCNC: 152 MG/DL — HIGH (ref 70–99)
GLUCOSE BLDC GLUCOMTR-MCNC: 201 MG/DL — HIGH (ref 70–99)
GLUCOSE SERPL-MCNC: 188 MG/DL — HIGH (ref 70–99)
MAGNESIUM SERPL-MCNC: 2.5 MG/DL — SIGNIFICANT CHANGE UP (ref 1.6–2.6)
POTASSIUM SERPL-MCNC: 3.9 MMOL/L — SIGNIFICANT CHANGE UP (ref 3.5–5.3)
POTASSIUM SERPL-SCNC: 3.9 MMOL/L — SIGNIFICANT CHANGE UP (ref 3.5–5.3)
SODIUM SERPL-SCNC: 132 MMOL/L — LOW (ref 135–145)

## 2025-03-10 PROCEDURE — 99233 SBSQ HOSP IP/OBS HIGH 50: CPT

## 2025-03-10 PROCEDURE — 99233 SBSQ HOSP IP/OBS HIGH 50: CPT | Mod: GC

## 2025-03-10 RX ORDER — SODIUM CHLORIDE 3 G/100ML
150 INJECTION, SOLUTION INTRAVENOUS ONCE
Refills: 0 | Status: COMPLETED | OUTPATIENT
Start: 2025-03-10 | End: 2025-03-10

## 2025-03-10 RX ORDER — POLYETHYLENE GLYCOL 3350 17 G/17G
17 POWDER, FOR SOLUTION ORAL
Refills: 0 | Status: DISCONTINUED | OUTPATIENT
Start: 2025-03-10 | End: 2025-04-14

## 2025-03-10 RX ORDER — CHLOROTHIAZIDE 250 MG/1
500 TABLET ORAL ONCE
Refills: 0 | Status: COMPLETED | OUTPATIENT
Start: 2025-03-10 | End: 2025-03-10

## 2025-03-10 RX ADMIN — INSULIN LISPRO 3 UNIT(S): 100 INJECTION, SOLUTION INTRAVENOUS; SUBCUTANEOUS at 12:32

## 2025-03-10 RX ADMIN — Medication 40 MILLIGRAM(S): at 05:07

## 2025-03-10 RX ADMIN — INSULIN LISPRO 4: 100 INJECTION, SOLUTION INTRAVENOUS; SUBCUTANEOUS at 08:31

## 2025-03-10 RX ADMIN — Medication 40 MILLIGRAM(S): at 17:36

## 2025-03-10 RX ADMIN — Medication 5 MILLIGRAM(S): at 17:37

## 2025-03-10 RX ADMIN — INSULIN GLARGINE-YFGN 18 UNIT(S): 100 INJECTION, SOLUTION SUBCUTANEOUS at 21:43

## 2025-03-10 RX ADMIN — EPOETIN ALFA 10000 UNIT(S): 10000 SOLUTION INTRAVENOUS; SUBCUTANEOUS at 13:31

## 2025-03-10 RX ADMIN — INSULIN LISPRO 3 UNIT(S): 100 INJECTION, SOLUTION INTRAVENOUS; SUBCUTANEOUS at 17:38

## 2025-03-10 RX ADMIN — METOPROLOL SUCCINATE 50 MILLIGRAM(S): 50 TABLET, EXTENDED RELEASE ORAL at 08:34

## 2025-03-10 RX ADMIN — TIOTROPIUM BROMIDE INHALATION SPRAY 2 PUFF(S): 3.12 SPRAY, METERED RESPIRATORY (INHALATION) at 12:36

## 2025-03-10 RX ADMIN — SODIUM CHLORIDE 200 MILLILITER(S): 3 INJECTION, SOLUTION INTRAVENOUS at 22:45

## 2025-03-10 RX ADMIN — ISOSORBDIE DINITRATE 20 MILLIGRAM(S): 30 TABLET ORAL at 12:39

## 2025-03-10 RX ADMIN — RIVAROXABAN 15 MILLIGRAM(S): 10 TABLET, FILM COATED ORAL at 17:37

## 2025-03-10 RX ADMIN — Medication 1 DOSE(S): at 17:35

## 2025-03-10 RX ADMIN — TAMSULOSIN HYDROCHLORIDE 0.4 MILLIGRAM(S): 0.4 CAPSULE ORAL at 21:03

## 2025-03-10 RX ADMIN — IPRATROPIUM BROMIDE AND ALBUTEROL SULFATE 3 MILLILITER(S): .5; 2.5 SOLUTION RESPIRATORY (INHALATION) at 06:14

## 2025-03-10 RX ADMIN — INSULIN LISPRO 3 UNIT(S): 100 INJECTION, SOLUTION INTRAVENOUS; SUBCUTANEOUS at 08:31

## 2025-03-10 RX ADMIN — Medication 75 MILLIGRAM(S): at 21:03

## 2025-03-10 RX ADMIN — ISOSORBDIE DINITRATE 20 MILLIGRAM(S): 30 TABLET ORAL at 17:37

## 2025-03-10 RX ADMIN — Medication 1 SPRAY(S): at 05:07

## 2025-03-10 RX ADMIN — Medication 1 PACKET(S): at 12:31

## 2025-03-10 RX ADMIN — Medication 1 SPRAY(S): at 17:35

## 2025-03-10 RX ADMIN — ISOSORBDIE DINITRATE 20 MILLIGRAM(S): 30 TABLET ORAL at 05:07

## 2025-03-10 RX ADMIN — Medication 50 MILLIGRAM(S): at 05:07

## 2025-03-10 RX ADMIN — Medication 10 MILLIEQUIVALENT(S): at 12:30

## 2025-03-10 RX ADMIN — Medication 1 DOSE(S): at 05:07

## 2025-03-10 RX ADMIN — Medication 75 MILLIGRAM(S): at 13:24

## 2025-03-10 RX ADMIN — INSULIN LISPRO 2: 100 INJECTION, SOLUTION INTRAVENOUS; SUBCUTANEOUS at 12:31

## 2025-03-10 RX ADMIN — Medication 81 MILLIGRAM(S): at 12:30

## 2025-03-10 RX ADMIN — ATORVASTATIN CALCIUM 20 MILLIGRAM(S): 80 TABLET, FILM COATED ORAL at 21:03

## 2025-03-10 RX ADMIN — BUMETANIDE 10 MG/HR: 1 TABLET ORAL at 15:24

## 2025-03-10 RX ADMIN — FOLIC ACID 1 MILLIGRAM(S): 1 TABLET ORAL at 12:31

## 2025-03-10 RX ADMIN — CHLOROTHIAZIDE 100 MILLIGRAM(S): 250 TABLET ORAL at 15:29

## 2025-03-10 RX ADMIN — SODIUM CHLORIDE 200 MILLILITER(S): 3 INJECTION, SOLUTION INTRAVENOUS at 15:27

## 2025-03-10 RX ADMIN — Medication 100 MILLIGRAM(S): at 12:31

## 2025-03-10 NOTE — PROGRESS NOTE ADULT - SUBJECTIVE AND OBJECTIVE BOX
Contreras Brooks MD  Division of Hospital Medicine  Available on MS teams until 7pm  If no response or off-hours, page [phone]  -------------------------------------    Patient is a 79y old  Male who presents with a chief complaint of HF eval (10 Mar 2025 13:22)    SUBJECTIVE / OVERNIGHT EVENTS: none acute  ADDITIONAL REVIEW OF SYSTEMS: pt with ongoing leg swelling and ongoing sob.    MEDICATIONS  (STANDING):  allopurinol 100 milliGRAM(s) Oral daily  aspirin enteric coated 81 milliGRAM(s) Oral daily  atorvastatin 20 milliGRAM(s) Oral at bedtime  buMETAnide Infusion 2 mG/Hr (10 mL/Hr) IV Continuous <Continuous>  chlorothiazide IVPB 500 milliGRAM(s) IV Intermittent once  dextrose 5%. 1000 milliLiter(s) (50 mL/Hr) IV Continuous <Continuous>  dextrose 5%. 1000 milliLiter(s) (100 mL/Hr) IV Continuous <Continuous>  dextrose 50% Injectable 25 Gram(s) IV Push once  dextrose 50% Injectable 12.5 Gram(s) IV Push once  dextrose 50% Injectable 25 Gram(s) IV Push once  epoetin lashawn (EPOGEN) Injectable 96224 Unit(s) SubCutaneous every 7 days  fluticasone propionate/ salmeterol 250-50 MICROgram(s) Diskus 1 Dose(s) Inhalation two times a day  folic acid 1 milliGRAM(s) Oral daily  glucagon  Injectable 1 milliGRAM(s) IntraMuscular once  hydrALAZINE 75 milliGRAM(s) Oral three times a day  insulin glargine Injectable (LANTUS) 18 Unit(s) SubCutaneous at bedtime  insulin lispro (ADMELOG) corrective regimen sliding scale   SubCutaneous three times a day before meals  insulin lispro Injectable (ADMELOG) 3 Unit(s) SubCutaneous three times a day before meals  isosorbide   dinitrate Tablet (ISORDIL) 20 milliGRAM(s) Oral three times a day  metoprolol succinate ER 50 milliGRAM(s) Oral daily  pantoprazole    Tablet 40 milliGRAM(s) Oral every 12 hours  potassium chloride    Tablet ER 10 milliEquivalent(s) Oral daily  psyllium Powder 1 Packet(s) Oral daily  rivaroxaban 15 milliGRAM(s) Oral with dinner  sodium chloride 0.65% Nasal 1 Spray(s) Both Nostrils two times a day  sodium chloride 3% Bolus 250 milliLiter(s) IV Bolus once  sodium chloride 3% Bolus 250 milliLiter(s) IV Bolus once  tamsulosin 0.4 milliGRAM(s) Oral at bedtime  tiotropium 2.5 MICROgram(s) Inhaler 2 Puff(s) Inhalation daily    MEDICATIONS  (PRN):  acetaminophen     Tablet .. 650 milliGRAM(s) Oral every 6 hours PRN Temp greater or equal to 38C (100.4F), Mild Pain (1 - 3)  albuterol/ipratropium for Nebulization 3 milliLiter(s) Nebulizer every 6 hours PRN Shortness of Breath and/or Wheezing  bisacodyl 5 milliGRAM(s) Oral every 12 hours PRN Constipation  dextrose Oral Gel 15 Gram(s) Oral once PRN Blood Glucose LESS THAN 70 milliGRAM(s)/deciliter  dextrose Oral Gel 15 Gram(s) Oral once PRN Blood Glucose LESS THAN 70 milliGRAM(s)/deciliter  melatonin 3 milliGRAM(s) Oral at bedtime PRN Insomnia  polyethylene glycol 3350 17 Gram(s) Oral daily PRN Constipation      CAPILLARY BLOOD GLUCOSE      POCT Blood Glucose.: 152 mg/dL (10 Mar 2025 12:17)  POCT Blood Glucose.: 201 mg/dL (10 Mar 2025 08:26)  POCT Blood Glucose.: 145 mg/dL (09 Mar 2025 21:10)  POCT Blood Glucose.: 167 mg/dL (09 Mar 2025 17:21)    I&O's Summary    09 Mar 2025 07:01  -  10 Mar 2025 07:00  --------------------------------------------------------  IN: 120 mL / OUT: 400 mL / NET: -280 mL    10 Mar 2025 07:01  -  10 Mar 2025 14:34  --------------------------------------------------------  IN: 320 mL / OUT: 450 mL / NET: -130 mL        PHYSICAL EXAM:  Vital Signs Last 24 Hrs  T(C): 36.7 (10 Mar 2025 12:04), Max: 36.8 (09 Mar 2025 23:51)  T(F): 98 (10 Mar 2025 12:04), Max: 98.3 (09 Mar 2025 23:51)  HR: 74 (10 Mar 2025 13:22) (67 - 74)  BP: 125/73 (10 Mar 2025 13:22) (101/63 - 128/82)  BP(mean): --  RR: 16 (10 Mar 2025 12:04) (16 - 18)  SpO2: 98% (10 Mar 2025 12:04) (95% - 98%)    Parameters below as of 10 Mar 2025 12:04  Patient On (Oxygen Delivery Method): nasal cannula  O2 Flow (L/min): 3    CONSTITUTIONAL: NAD  EYES: PERRLA; conjunctiva and sclera clear  ENMT: MMM  NECK: Supple  RESPIRATORY: Normal respiratory effort; CTAB  CARDIOVASCULAR: RRR, no JVD, +2 pitting edema  ABDOMEN: Nontender to palpation, normoactive BS, no guarding/rigidity  MUSCLOSKELETAL:  no clubbing/cyanosis, no joint swelling or tenderness to palpation  PSYCH: A+O x 3, affect normal  NEUROLOGY: CN 2-12 are intact and symmetric; no gross sensory or motor deficits  SKIN: No rashes; no palpable lesions    LABS:    03-10    132[L]  |  85[L]  |  105[H]  ----------------------------<  188[H]  3.9   |  32[H]  |  2.97[H]    Ca    8.9      10 Mar 2025 07:09  Mg     2.5     03-10    TPro  7.5  /  Alb  3.6  /  TBili  0.6  /  DBili  x   /  AST  21  /  ALT  13  /  AlkPhos  223[H]  03-09          Urinalysis Basic - ( 10 Mar 2025 07:09 )    Color: x / Appearance: x / SG: x / pH: x  Gluc: 188 mg/dL / Ketone: x  / Bili: x / Urobili: x   Blood: x / Protein: x / Nitrite: x   Leuk Esterase: x / RBC: x / WBC x   Sq Epi: x / Non Sq Epi: x / Bacteria: x          RADIOLOGY & ADDITIONAL TESTS:  Results Reviewed:   Imaging Personally Reviewed:  Electrocardiogram Personally Reviewed:    COORDINATION OF CARE:  Care Discussed with Consultants/Other Providers [Y/N]: HF  Prior or Outpatient Records Reviewed [Y/N]:

## 2025-03-10 NOTE — PROGRESS NOTE ADULT - SUBJECTIVE AND OBJECTIVE BOX
No distress, on NC O2    Vital Signs Last 24 Hrs  T(C): 36.7 (03-10-25 @ 12:04), Max: 36.8 (03-09-25 @ 23:51)  T(F): 98 (03-10-25 @ 12:04), Max: 98.3 (03-09-25 @ 23:51)  HR: 74 (03-10-25 @ 13:22) (67 - 74)  BP: 125/73 (03-10-25 @ 13:22) (101/63 - 128/82)  RR: 16 (03-10-25 @ 12:04) (16 - 18)  SpO2: 98% (03-10-25 @ 12:04) (95% - 98%)    I&O's Detail    10 Mar 2025 07:01  -  10 Mar 2025 17:13  --------------------------------------------------------  IN:    Oral Fluid: 320 mL  Total IN: 320 mL    OUT:    Voided (mL): 450 mL  Total OUT: 450 mL    s1s2  b/l air entry  soft, ascites   edema                                                                            10 Mar 2025 07:09    132    |  85     |  105    ----------------------------<  188    3.9     |  32     |  2.97     Ca    8.9        10 Mar 2025 07:09  Mg     2.5       10 Mar 2025 07:09    TPro  7.5    /  Alb  3.6    /  TBili  0.6    /  DBili  x      /  AST  21     /  ALT  13     /  AlkPhos  223    09 Mar 2025 12:21    LIVER FUNCTIONS - ( 09 Mar 2025 12:21 )  Alb: 3.6 g/dL / Pro: 7.5 g/dL / ALK PHOS: 223 U/L / ALT: 13 U/L / AST: 21 U/L / GGT: x           acetaminophen     Tablet .. 650 milliGRAM(s) Oral every 6 hours PRN  albuterol/ipratropium for Nebulization 3 milliLiter(s) Nebulizer every 6 hours PRN  allopurinol 100 milliGRAM(s) Oral daily  aspirin enteric coated 81 milliGRAM(s) Oral daily  atorvastatin 20 milliGRAM(s) Oral at bedtime  bisacodyl 5 milliGRAM(s) Oral every 12 hours PRN  buMETAnide Infusion 2 mG/Hr IV Continuous <Continuous>  dextrose 5%. 1000 milliLiter(s) IV Continuous <Continuous>  dextrose 5%. 1000 milliLiter(s) IV Continuous <Continuous>  dextrose 50% Injectable 25 Gram(s) IV Push once  dextrose 50% Injectable 12.5 Gram(s) IV Push once  dextrose 50% Injectable 25 Gram(s) IV Push once  dextrose Oral Gel 15 Gram(s) Oral once PRN  dextrose Oral Gel 15 Gram(s) Oral once PRN  epoetin lashawn (EPOGEN) Injectable 20465 Unit(s) SubCutaneous every 7 days  fluticasone propionate/ salmeterol 250-50 MICROgram(s) Diskus 1 Dose(s) Inhalation two times a day  folic acid 1 milliGRAM(s) Oral daily  glucagon  Injectable 1 milliGRAM(s) IntraMuscular once  hydrALAZINE 75 milliGRAM(s) Oral three times a day  insulin glargine Injectable (LANTUS) 18 Unit(s) SubCutaneous at bedtime  insulin lispro (ADMELOG) corrective regimen sliding scale   SubCutaneous three times a day before meals  insulin lispro Injectable (ADMELOG) 3 Unit(s) SubCutaneous three times a day before meals  isosorbide   dinitrate Tablet (ISORDIL) 20 milliGRAM(s) Oral three times a day  melatonin 3 milliGRAM(s) Oral at bedtime PRN  metoprolol succinate ER 50 milliGRAM(s) Oral daily  pantoprazole    Tablet 40 milliGRAM(s) Oral every 12 hours  polyethylene glycol 3350 17 Gram(s) Oral daily PRN  potassium chloride    Tablet ER 10 milliEquivalent(s) Oral daily  psyllium Powder 1 Packet(s) Oral daily  rivaroxaban 15 milliGRAM(s) Oral with dinner  sodium chloride 0.65% Nasal 1 Spray(s) Both Nostrils two times a day  sodium chloride 3% Bolus 150 milliLiter(s) IV Bolus once  tamsulosin 0.4 milliGRAM(s) Oral at bedtime  tiotropium 2.5 MICROgram(s) Inhaler 2 Puff(s) Inhalation daily    A/P:    CM, EF ~ 30%, severe TR  Adm to [facility] 1/10 w/fluid overload   Tx to [facility] 2/5 for further management   Known CKD, (baseline Cr ~ 2. - 2.5)  Hemodynamic/cardio-renal DENISHA/CKD   Would hold Metolazone  Continue Bumex qtt  Cr appears to be at plateau   F/u BMP, Mg, UO  Avoid nephrotoxins as possible   Prognosis is guarded   No HD if/when became necessary, per d/w pt and family     [phone]

## 2025-03-10 NOTE — PROGRESS NOTE ADULT - ASSESSMENT
80 y/o male w/ PMHx CAD s/p CABG, HFrEF (LVIDd 5.2, LVEF 25-30%) w/ DC- ICD, AFib (Xarelto), severe AS s/p TAVR, COPD on 3-4L home O2, CKD3, HTN, HLD, T2DM, and BPH presented to [facility] for ADHF, despite escalation of home diuretics. Now transferred to [facility] for refractory volume overload. His labs notable for stable Scr (though unclear baseline). Remains DNR/DNI per wife - wants the ICD on. Bumex escalated to continuous infusion but remains volume expanded.     Cardiac Studies  TTE 2/7/25 LVIDd 5.3cm, LVEF 30%, no LV thrombus, TAPSE 1.0cm, severely enlarged RV size/RVSF reduced, mod dilated LA, severe dilated RA, TAVR present, mod MR, severe TR, PASP 52, PASP 52, no pericardial effusion, IVC dilated 2.35cm  TTE 8/8/24; LVIDd 5.2, EF 25-30%, global hypokinesis, severe grade III DD, TASPE 1.1, TAVR present in AV position, mld-mod MR, mod TR, PASP 53

## 2025-03-10 NOTE — PROGRESS NOTE ADULT - PROBLEM SELECTOR PLAN 1
EF 30% with severe tricuspid regurg and PASP 55mmHG on 2/7/24. Dry wt is 243 lbs.   -noted bump in creatine and bicarbonate 3/8.   -nop weight last 48 hour, replaced daily stanidng weight order.  -will conitnue bumex @ 2 3/8. If worsening bicarb and/or creatinine would bring down diuretics given minimal UOP, and concern for contraction alkalosis.   - GDMT:  Metoprolol succinate 50mg day. Holding entresto and aldactone for DENISHA. Not on SGLT2.  - Hydralazine/Nitrate: hydral 50mg TID. Isordil 20mg TID   - Cardiology following, patient declined RHC and cardiomems  - add HTS 3% 250cc over 45 min x 2 today  - add diuril 500mg iv x 1 today  - strict i/o- may need jayjay

## 2025-03-10 NOTE — PROGRESS NOTE ADULT - ASSESSMENT
80 y/o male w/ PMHx CAD s/p CABG, HF (combined HFrEF [EF 25%] and HFpEF [G3DD]) w/ ICD, AFib on Xarelto, severe AS s/p TAVR, COPD on 3-4L home O2, CKD4, HTN, HLD, T2DM, and BPH who presents as a transfer from [facility] for HF evaluation. Ongoing CHF exacerbation now off bumex gtt, new DENISHA on CKD

## 2025-03-10 NOTE — PROGRESS NOTE ADULT - PROBLEM SELECTOR PLAN 1
Etiology likely ischemic. Primary cardiologist Dr. Jewel Stubbs ([facility]), last TTE LVEF 25-30%. Appears has been on some GDMT as outpt (?Farxiga, Coreg and previously on ARNI though unclear why stopped)  - repeat proBNP  - Diuresis  -- c/w Bumex gtt @ 2  -- hypertonic saline 250mL BID  -- diuril 500mg IV x1  -- BID Lytes -- goal K > 4, Mg > 2 -- STRICT I/Os -- goal neg 1-2L -- please place [place] for I/Os  - please obtain bladder scan   - GDMT  -- Toprol XL 50mg QD  -- increase Hydralazine to 75mg TID; Hold for SBP <90  -- c/w Isordil 20mg TID; Hold for SBP <90  -- hold Entresto and dino for DENISHA  - Has ICD in place; Currently a DNR/DNI: trial NIV. Discussed with palliative and at this time wants ICD on as per wife  - likely not a good candidate for MEMs  - recommend repeat Palliative Care evaluation given profound volume overload and recurrent admissions Etiology likely ischemic. Primary cardiologist Dr. Jewel Stubbs ([facility]), last TTE LVEF 25-30%. Appears has been on some GDMT as outpt (?Farxiga, Coreg and previously on ARNI though unclear why stopped)  - repeat proBNP  - Diuresis  -- c/w Bumex gtt @ 2  -- hypertonic saline 150 cc BID  -- diuril 500mg IV x1  -- BID Lytes -- goal K > 4, Mg > 2 -- STRICT I/Os -- goal neg 1-2L -- please place [place] for I/Os  - please obtain bladder scan   - GDMT  -- Toprol XL 50mg QD  -- increase Hydralazine to 75mg TID; Hold for SBP <90  -- c/w Isordil 20mg TID; Hold for SBP <90  -- hold Entresto and dino for DENISHA  - Has ICD in place; Currently a DNR/DNI: trial NIV. Discussed with palliative and at this time wants ICD on as per wife  - likely not a good candidate for MEMs  - recommend repeat Palliative Care evaluation given profound volume overload and recurrent admissions

## 2025-03-10 NOTE — PROGRESS NOTE ADULT - ATTENDING COMMENTS
Mr. Solares is an 78 y/o male w/ h/o CAD s/p CABG, HFrEF/ICM (LVIDd 5.2, LVEF 25-30%) w/ DC- ICD, AFib (Xarelto), severe AS s/p TAVR, COPD (on 3-4L home O2), CKD3 (b/l 2.0), HTN, HLD, T2DM (7.9 1/25), and BPH presented to [facility] 1/10 for ADHF, despite escalation of home diuretics and subsequently  transferred to [facility] 2/5 for refractory volume overload. Since admission, has needed escalating diuretics but remains severely overloaded. Labs reviewed - Hb 7.0, BUN/Cr 116/2.27, BNP 53k. Remains DNR/DNI per wife - wants the ICD on. Bumex escalated to continuous infusion but remains volume expanded.   - diuril as above  - c/w bumex gtt  - start 3% HT saline daily  - prognosis guarded  - hydral/isordil as above  - prognosis guarded

## 2025-03-10 NOTE — PROGRESS NOTE ADULT - SUBJECTIVE AND OBJECTIVE BOX
Overnight Events: Hypoxic to 85% while ambulating, improved w/ rest    Review Of Systems: No chest pain, shortness of breath, or palpitations            Current Meds:  acetaminophen     Tablet .. 650 milliGRAM(s) Oral every 6 hours PRN  albuterol/ipratropium for Nebulization 3 milliLiter(s) Nebulizer every 6 hours PRN  allopurinol 100 milliGRAM(s) Oral daily  aspirin enteric coated 81 milliGRAM(s) Oral daily  atorvastatin 20 milliGRAM(s) Oral at bedtime  bisacodyl 5 milliGRAM(s) Oral every 12 hours PRN  buMETAnide Infusion 2 mG/Hr IV Continuous <Continuous>  dextrose 5%. 1000 milliLiter(s) IV Continuous <Continuous>  dextrose 5%. 1000 milliLiter(s) IV Continuous <Continuous>  dextrose 50% Injectable 25 Gram(s) IV Push once  dextrose 50% Injectable 12.5 Gram(s) IV Push once  dextrose 50% Injectable 25 Gram(s) IV Push once  dextrose Oral Gel 15 Gram(s) Oral once PRN  dextrose Oral Gel 15 Gram(s) Oral once PRN  epoetin lashawn (EPOGEN) Injectable 18635 Unit(s) SubCutaneous every 7 days  fluticasone propionate/ salmeterol 250-50 MICROgram(s) Diskus 1 Dose(s) Inhalation two times a day  folic acid 1 milliGRAM(s) Oral daily  glucagon  Injectable 1 milliGRAM(s) IntraMuscular once  hydrALAZINE 75 milliGRAM(s) Oral three times a day  insulin glargine Injectable (LANTUS) 18 Unit(s) SubCutaneous at bedtime  insulin lispro (ADMELOG) corrective regimen sliding scale   SubCutaneous three times a day before meals  insulin lispro Injectable (ADMELOG) 3 Unit(s) SubCutaneous three times a day before meals  isosorbide   dinitrate Tablet (ISORDIL) 20 milliGRAM(s) Oral three times a day  melatonin 3 milliGRAM(s) Oral at bedtime PRN  metoprolol succinate ER 50 milliGRAM(s) Oral daily  pantoprazole    Tablet 40 milliGRAM(s) Oral every 12 hours  polyethylene glycol 3350 17 Gram(s) Oral daily PRN  potassium chloride    Tablet ER 10 milliEquivalent(s) Oral daily  psyllium Powder 1 Packet(s) Oral daily  rivaroxaban 15 milliGRAM(s) Oral with dinner  sodium chloride 0.65% Nasal 1 Spray(s) Both Nostrils two times a day  tamsulosin 0.4 milliGRAM(s) Oral at bedtime  tiotropium 2.5 MICROgram(s) Inhaler 2 Puff(s) Inhalation daily      Vitals:  T(F): 98 (03-10), Max: 98.3 (03-09)  HR: 67 (03-10) (67 - 71)  BP: 101/63 (03-10) (101/63 - 128/82)  RR: 16 (03-10)  SpO2: 98% (03-10)  I&O's Summary    09 Mar 2025 07:01  -  10 Mar 2025 07:00  --------------------------------------------------------  IN: 120 mL / OUT: 400 mL / NET: -280 mL    10 Mar 2025 07:01  -  10 Mar 2025 13:22  --------------------------------------------------------  IN: 320 mL / OUT: 450 mL / NET: -130 mL        Physical Exam:  GEN: comfortable appearing, lying in bed in NAD  HEENT: NCAT, MMM  CV: Regular S1, S2, no m/r/g, +JVD to earlobe  RESP: CTAB  ABD: Soft, NTND, +BS  EXT: Diffuse 3+ bl pitting LE and UE edema, WWP, pulses palpable throughout  NEURO: No focal deficits, AOx3  SKIN:  No rashes      03-10    132[L]  |  85[L]  |  105[H]  ----------------------------<  188[H]  3.9   |  32[H]  |  2.97[H]    Ca    8.9      10 Mar 2025 07:09  Mg     2.5     03-10    TPro  7.5  /  Alb  3.6  /  TBili  0.6  /  DBili  x   /  AST  21  /  ALT  13  /  AlkPhos  223[H]  03-09

## 2025-03-11 LAB
ADD ON TEST-SPECIMEN IN LAB: SIGNIFICANT CHANGE UP
ANION GAP SERPL CALC-SCNC: 16 MMOL/L — SIGNIFICANT CHANGE UP (ref 5–17)
ANION GAP SERPL CALC-SCNC: 16 MMOL/L — SIGNIFICANT CHANGE UP (ref 5–17)
BUN SERPL-MCNC: 95 MG/DL — HIGH (ref 7–23)
BUN SERPL-MCNC: 96 MG/DL — HIGH (ref 7–23)
CALCIUM SERPL-MCNC: 9.2 MG/DL — SIGNIFICANT CHANGE UP (ref 8.4–10.5)
CALCIUM SERPL-MCNC: 9.2 MG/DL — SIGNIFICANT CHANGE UP (ref 8.4–10.5)
CHLORIDE SERPL-SCNC: 87 MMOL/L — LOW (ref 96–108)
CHLORIDE SERPL-SCNC: 88 MMOL/L — LOW (ref 96–108)
CO2 SERPL-SCNC: 33 MMOL/L — HIGH (ref 22–31)
CO2 SERPL-SCNC: 33 MMOL/L — HIGH (ref 22–31)
CREAT SERPL-MCNC: 2.67 MG/DL — HIGH (ref 0.5–1.3)
CREAT SERPL-MCNC: 2.8 MG/DL — HIGH (ref 0.5–1.3)
EGFR: 22 ML/MIN/1.73M2 — LOW
EGFR: 22 ML/MIN/1.73M2 — LOW
EGFR: 23 ML/MIN/1.73M2 — LOW
EGFR: 23 ML/MIN/1.73M2 — LOW
GLUCOSE BLDC GLUCOMTR-MCNC: 137 MG/DL — HIGH (ref 70–99)
GLUCOSE BLDC GLUCOMTR-MCNC: 151 MG/DL — HIGH (ref 70–99)
GLUCOSE BLDC GLUCOMTR-MCNC: 166 MG/DL — HIGH (ref 70–99)
GLUCOSE BLDC GLUCOMTR-MCNC: 177 MG/DL — HIGH (ref 70–99)
GLUCOSE SERPL-MCNC: 127 MG/DL — HIGH (ref 70–99)
GLUCOSE SERPL-MCNC: 131 MG/DL — HIGH (ref 70–99)
MAGNESIUM SERPL-MCNC: 2.5 MG/DL — SIGNIFICANT CHANGE UP (ref 1.6–2.6)
NT-PROBNP SERPL-SCNC: HIGH PG/ML (ref 0–300)
PHOSPHATE SERPL-MCNC: 5.5 MG/DL — HIGH (ref 2.5–4.5)
POTASSIUM SERPL-MCNC: 2.8 MMOL/L — CRITICAL LOW (ref 3.5–5.3)
POTASSIUM SERPL-MCNC: 3.5 MMOL/L — SIGNIFICANT CHANGE UP (ref 3.5–5.3)
POTASSIUM SERPL-SCNC: 2.8 MMOL/L — CRITICAL LOW (ref 3.5–5.3)
POTASSIUM SERPL-SCNC: 3.5 MMOL/L — SIGNIFICANT CHANGE UP (ref 3.5–5.3)
SODIUM SERPL-SCNC: 136 MMOL/L — SIGNIFICANT CHANGE UP (ref 135–145)
SODIUM SERPL-SCNC: 137 MMOL/L — SIGNIFICANT CHANGE UP (ref 135–145)

## 2025-03-11 PROCEDURE — 99233 SBSQ HOSP IP/OBS HIGH 50: CPT

## 2025-03-11 RX ORDER — SODIUM CHLORIDE 3 G/100ML
150 INJECTION, SOLUTION INTRAVENOUS ONCE
Refills: 0 | Status: COMPLETED | OUTPATIENT
Start: 2025-03-11 | End: 2025-03-11

## 2025-03-11 RX ORDER — CHLOROTHIAZIDE 250 MG/1
500 TABLET ORAL ONCE
Refills: 0 | Status: COMPLETED | OUTPATIENT
Start: 2025-03-11 | End: 2025-03-11

## 2025-03-11 RX ORDER — ISOSORBDIE DINITRATE 30 MG/1
30 TABLET ORAL THREE TIMES A DAY
Refills: 0 | Status: DISCONTINUED | OUTPATIENT
Start: 2025-03-11 | End: 2025-03-15

## 2025-03-11 RX ADMIN — POLYETHYLENE GLYCOL 3350 17 GRAM(S): 17 POWDER, FOR SOLUTION ORAL at 19:36

## 2025-03-11 RX ADMIN — Medication 1 SPRAY(S): at 05:02

## 2025-03-11 RX ADMIN — SODIUM CHLORIDE 300 MILLILITER(S): 3 INJECTION, SOLUTION INTRAVENOUS at 18:15

## 2025-03-11 RX ADMIN — Medication 1 SPRAY(S): at 18:12

## 2025-03-11 RX ADMIN — Medication 75 MILLIGRAM(S): at 05:02

## 2025-03-11 RX ADMIN — Medication 10 MILLIEQUIVALENT(S): at 19:17

## 2025-03-11 RX ADMIN — INSULIN LISPRO 3 UNIT(S): 100 INJECTION, SOLUTION INTRAVENOUS; SUBCUTANEOUS at 09:31

## 2025-03-11 RX ADMIN — TIOTROPIUM BROMIDE INHALATION SPRAY 2 PUFF(S): 3.12 SPRAY, METERED RESPIRATORY (INHALATION) at 12:52

## 2025-03-11 RX ADMIN — Medication 1 DOSE(S): at 05:02

## 2025-03-11 RX ADMIN — TAMSULOSIN HYDROCHLORIDE 0.4 MILLIGRAM(S): 0.4 CAPSULE ORAL at 21:31

## 2025-03-11 RX ADMIN — Medication 1 PACKET(S): at 12:56

## 2025-03-11 RX ADMIN — Medication 75 MILLIGRAM(S): at 21:31

## 2025-03-11 RX ADMIN — ISOSORBDIE DINITRATE 20 MILLIGRAM(S): 30 TABLET ORAL at 05:02

## 2025-03-11 RX ADMIN — INSULIN GLARGINE-YFGN 18 UNIT(S): 100 INJECTION, SOLUTION SUBCUTANEOUS at 21:31

## 2025-03-11 RX ADMIN — Medication 40 MILLIGRAM(S): at 18:13

## 2025-03-11 RX ADMIN — Medication 40 MILLIEQUIVALENT(S): at 16:28

## 2025-03-11 RX ADMIN — SODIUM CHLORIDE 300 MILLILITER(S): 3 INJECTION, SOLUTION INTRAVENOUS at 16:28

## 2025-03-11 RX ADMIN — Medication 40 MILLIEQUIVALENT(S): at 12:50

## 2025-03-11 RX ADMIN — INSULIN LISPRO 2: 100 INJECTION, SOLUTION INTRAVENOUS; SUBCUTANEOUS at 18:14

## 2025-03-11 RX ADMIN — Medication 5 MILLIGRAM(S): at 18:20

## 2025-03-11 RX ADMIN — Medication 75 MILLIGRAM(S): at 14:00

## 2025-03-11 RX ADMIN — Medication 1 DOSE(S): at 18:12

## 2025-03-11 RX ADMIN — ISOSORBDIE DINITRATE 30 MILLIGRAM(S): 30 TABLET ORAL at 16:30

## 2025-03-11 RX ADMIN — POLYETHYLENE GLYCOL 3350 17 GRAM(S): 17 POWDER, FOR SOLUTION ORAL at 05:03

## 2025-03-11 RX ADMIN — FOLIC ACID 1 MILLIGRAM(S): 1 TABLET ORAL at 12:53

## 2025-03-11 RX ADMIN — RIVAROXABAN 15 MILLIGRAM(S): 10 TABLET, FILM COATED ORAL at 18:14

## 2025-03-11 RX ADMIN — METOPROLOL SUCCINATE 50 MILLIGRAM(S): 50 TABLET, EXTENDED RELEASE ORAL at 09:33

## 2025-03-11 RX ADMIN — INSULIN LISPRO 2: 100 INJECTION, SOLUTION INTRAVENOUS; SUBCUTANEOUS at 09:32

## 2025-03-11 RX ADMIN — Medication 100 MILLIGRAM(S): at 12:52

## 2025-03-11 RX ADMIN — INSULIN LISPRO 2: 100 INJECTION, SOLUTION INTRAVENOUS; SUBCUTANEOUS at 12:55

## 2025-03-11 RX ADMIN — INSULIN LISPRO 3 UNIT(S): 100 INJECTION, SOLUTION INTRAVENOUS; SUBCUTANEOUS at 18:15

## 2025-03-11 RX ADMIN — ATORVASTATIN CALCIUM 20 MILLIGRAM(S): 80 TABLET, FILM COATED ORAL at 21:31

## 2025-03-11 RX ADMIN — Medication 81 MILLIGRAM(S): at 12:53

## 2025-03-11 RX ADMIN — IPRATROPIUM BROMIDE AND ALBUTEROL SULFATE 3 MILLILITER(S): .5; 2.5 SOLUTION RESPIRATORY (INHALATION) at 05:03

## 2025-03-11 RX ADMIN — CHLOROTHIAZIDE 100 MILLIGRAM(S): 250 TABLET ORAL at 22:55

## 2025-03-11 RX ADMIN — INSULIN LISPRO 3 UNIT(S): 100 INJECTION, SOLUTION INTRAVENOUS; SUBCUTANEOUS at 12:56

## 2025-03-11 RX ADMIN — Medication 40 MILLIGRAM(S): at 05:02

## 2025-03-11 NOTE — PROGRESS NOTE ADULT - ASSESSMENT
80 y/o male w/ PMHx CAD s/p CABG, HF (combined HFrEF [EF 25%] and HFpEF [G3DD]) w/ ICD, AFib on Xarelto, severe AS s/p TAVR, COPD on 3-4L home O2, CKD4, HTN, HLD, T2DM, and BPH who presents as a transfer from [facility] for HF evaluation. Ongoing CHF exacerbation now off bumex gtt, new DENISHA on CKD

## 2025-03-11 NOTE — PROGRESS NOTE ADULT - SUBJECTIVE AND OBJECTIVE BOX
Overnight Events:     Review Of Systems: No chest pain, shortness of breath, or palpitations            Current Meds:  acetaminophen     Tablet .. 650 milliGRAM(s) Oral every 6 hours PRN  albuterol/ipratropium for Nebulization 3 milliLiter(s) Nebulizer every 6 hours PRN  allopurinol 100 milliGRAM(s) Oral daily  aspirin enteric coated 81 milliGRAM(s) Oral daily  atorvastatin 20 milliGRAM(s) Oral at bedtime  bisacodyl 5 milliGRAM(s) Oral every 12 hours PRN  buMETAnide Infusion 2 mG/Hr IV Continuous <Continuous>  dextrose 5%. 1000 milliLiter(s) IV Continuous <Continuous>  dextrose 5%. 1000 milliLiter(s) IV Continuous <Continuous>  dextrose 50% Injectable 25 Gram(s) IV Push once  dextrose 50% Injectable 12.5 Gram(s) IV Push once  dextrose 50% Injectable 25 Gram(s) IV Push once  dextrose Oral Gel 15 Gram(s) Oral once PRN  dextrose Oral Gel 15 Gram(s) Oral once PRN  epoetin lashawn (EPOGEN) Injectable 05690 Unit(s) SubCutaneous every 7 days  fluticasone propionate/ salmeterol 250-50 MICROgram(s) Diskus 1 Dose(s) Inhalation two times a day  folic acid 1 milliGRAM(s) Oral daily  glucagon  Injectable 1 milliGRAM(s) IntraMuscular once  hydrALAZINE 75 milliGRAM(s) Oral three times a day  insulin glargine Injectable (LANTUS) 18 Unit(s) SubCutaneous at bedtime  insulin lispro (ADMELOG) corrective regimen sliding scale   SubCutaneous three times a day before meals  insulin lispro Injectable (ADMELOG) 3 Unit(s) SubCutaneous three times a day before meals  isosorbide   dinitrate Tablet (ISORDIL) 20 milliGRAM(s) Oral three times a day  melatonin 3 milliGRAM(s) Oral at bedtime PRN  metoprolol succinate ER 50 milliGRAM(s) Oral daily  pantoprazole    Tablet 40 milliGRAM(s) Oral every 12 hours  polyethylene glycol 3350 17 Gram(s) Oral two times a day  potassium chloride    Tablet ER 10 milliEquivalent(s) Oral daily  psyllium Powder 1 Packet(s) Oral daily  rivaroxaban 15 milliGRAM(s) Oral with dinner  sodium chloride 0.65% Nasal 1 Spray(s) Both Nostrils two times a day  tamsulosin 0.4 milliGRAM(s) Oral at bedtime  tiotropium 2.5 MICROgram(s) Inhaler 2 Puff(s) Inhalation daily      Vitals:  T(F): 97.6 (03-11), Max: 98 (03-10)  HR: 69 (03-11) (67 - 75)  BP: 122/69 (03-11) (101/63 - 125/73)  RR: 18 (03-11)  SpO2: 99% (03-11)  I&O's Summary    10 Mar 2025 07:01  -  11 Mar 2025 07:00  --------------------------------------------------------  IN: 440 mL / OUT: 1895 mL / NET: -1455 mL        Physical Exam:  GEN: comfortable appearing, lying in bed in NAD  HEENT: NCAT, MMM  CV: Regular S1, S2, no m/r/g  RESP: CTAB  ABD: Soft, NTND, +BS  EXT: No LE edema, WWP, pulses palpable throughout  NEURO: No focal deficits, AOx3  SKIN:  No rashes      03-11    136  |  87[L]  |  95[H]  ----------------------------<  131[H]  2.8[LL]   |  33[H]  |  2.80[H]    Ca    9.2      11 Mar 2025 07:06  Phos  5.5     03-11  Mg     2.5     03-11    TPro  7.5  /  Alb  3.6  /  TBili  0.6  /  DBili  x   /  AST  21  /  ALT  13  /  AlkPhos  223[H]  03-09

## 2025-03-11 NOTE — CHART NOTE - NSCHARTNOTEFT_GEN_A_CORE
78 y/o male w/ PMHx CAD s/p CABG, HFrEF (LVIDd 5.2, LVEF 25-30%) w/ DC- ICD, AFib (Xarelto), severe AS s/p TAVR, COPD on 3-4L home O2, CKD3, HTN, HLD, T2DM, and BPH presented to [facility] for ADHF, despite escalation of home diuretics. Now transferred to [facility] for refractory volume overload. His labs notable for stable Scr (though unclear baseline). Remains DNR/DNI per wife - wants the ICD on. Bumex escalated to continuous infusion but remains volume expanded. Pro BNP 29172p up from 8000s on admission. Continue bumex gtt, hypertonic saline BID, diuril 500mg IV x1. Increase isordil to 30mg PO TID, cont hydralazine 75mg PO TID, metoprolol. Will consider DCCV prior to discharge as sinus rhythm w/ BiV pacing may help prevent recurrent CHF exacerbations.       Elis Suarez MD  PGY-5, Cardiology  Available on TEAMS    For all new consults  [email or web address]  Login: [employer]

## 2025-03-11 NOTE — PROGRESS NOTE ADULT - PROBLEM SELECTOR PLAN 1
EF 30% with severe tricuspid regurg and PASP 55mmHG on 2/7/24. Dry wt is 243 lbs.   -noted bump in creatine and bicarbonate 3/8.   -nop weight last 48 hour, replaced daily stanidng weight order.  -will conitnue bumex @ 2 3/8. If worsening bicarb and/or creatinine would bring down diuretics given minimal UOP, and concern for contraction alkalosis.   - GDMT:  Metoprolol succinate 50mg day. Holding entresto and aldactone for DENISHA. Not on SGLT2.  - Hydralazine/Nitrate: hydral 50mg TID. Isordil 20mg TID   - Cardiology following, patient declined RHC and cardiomems  - add HTS 3% 150cc over 45 min x 2 today 3/11  - add diuril 500mg iv x 1 today 3/11  - strict i/o- may need ro  - aaggressively repleat K for hypokalemia

## 2025-03-11 NOTE — PROGRESS NOTE ADULT - SUBJECTIVE AND OBJECTIVE BOX
Contreras Brooks MD  Division of Hospital Medicine  Available on MS teams until 7pm  If no response or off-hours, page [phone]  -------------------------------------    Patient is a 79y old  Male who presents with a chief complaint of HF eval (11 Mar 2025 10:29)    SUBJECTIVE / OVERNIGHT EVENTS: none acute  ADDITIONAL REVIEW OF SYSTEMS: feels relatively unchanged today. Did have BM yesterday. No other new complaints    MEDICATIONS  (STANDING):  allopurinol 100 milliGRAM(s) Oral daily  aspirin enteric coated 81 milliGRAM(s) Oral daily  atorvastatin 20 milliGRAM(s) Oral at bedtime  buMETAnide Infusion 2 mG/Hr (10 mL/Hr) IV Continuous <Continuous>  dextrose 5%. 1000 milliLiter(s) (50 mL/Hr) IV Continuous <Continuous>  dextrose 5%. 1000 milliLiter(s) (100 mL/Hr) IV Continuous <Continuous>  dextrose 50% Injectable 25 Gram(s) IV Push once  dextrose 50% Injectable 12.5 Gram(s) IV Push once  dextrose 50% Injectable 25 Gram(s) IV Push once  epoetin lashawn (EPOGEN) Injectable 67627 Unit(s) SubCutaneous every 7 days  fluticasone propionate/ salmeterol 250-50 MICROgram(s) Diskus 1 Dose(s) Inhalation two times a day  folic acid 1 milliGRAM(s) Oral daily  glucagon  Injectable 1 milliGRAM(s) IntraMuscular once  hydrALAZINE 75 milliGRAM(s) Oral three times a day  insulin glargine Injectable (LANTUS) 18 Unit(s) SubCutaneous at bedtime  insulin lispro (ADMELOG) corrective regimen sliding scale   SubCutaneous three times a day before meals  insulin lispro Injectable (ADMELOG) 3 Unit(s) SubCutaneous three times a day before meals  isosorbide   dinitrate Tablet (ISORDIL) 30 milliGRAM(s) Oral three times a day  metoprolol succinate ER 50 milliGRAM(s) Oral daily  pantoprazole    Tablet 40 milliGRAM(s) Oral every 12 hours  polyethylene glycol 3350 17 Gram(s) Oral two times a day  potassium chloride    Tablet ER 40 milliEquivalent(s) Oral every 4 hours  potassium chloride    Tablet ER 10 milliEquivalent(s) Oral daily  psyllium Powder 1 Packet(s) Oral daily  rivaroxaban 15 milliGRAM(s) Oral with dinner  sodium chloride 0.65% Nasal 1 Spray(s) Both Nostrils two times a day  sodium chloride 3% Bolus 150 milliLiter(s) IV Bolus once  sodium chloride 3% Bolus 150 milliLiter(s) IV Bolus once  tamsulosin 0.4 milliGRAM(s) Oral at bedtime  tiotropium 2.5 MICROgram(s) Inhaler 2 Puff(s) Inhalation daily    MEDICATIONS  (PRN):  acetaminophen     Tablet .. 650 milliGRAM(s) Oral every 6 hours PRN Temp greater or equal to 38C (100.4F), Mild Pain (1 - 3)  albuterol/ipratropium for Nebulization 3 milliLiter(s) Nebulizer every 6 hours PRN Shortness of Breath and/or Wheezing  bisacodyl 5 milliGRAM(s) Oral every 12 hours PRN Constipation  dextrose Oral Gel 15 Gram(s) Oral once PRN Blood Glucose LESS THAN 70 milliGRAM(s)/deciliter  dextrose Oral Gel 15 Gram(s) Oral once PRN Blood Glucose LESS THAN 70 milliGRAM(s)/deciliter  melatonin 3 milliGRAM(s) Oral at bedtime PRN Insomnia      CAPILLARY BLOOD GLUCOSE      POCT Blood Glucose.: 177 mg/dL (11 Mar 2025 12:28)  POCT Blood Glucose.: 151 mg/dL (11 Mar 2025 08:47)  POCT Blood Glucose.: 108 mg/dL (10 Mar 2025 21:28)  POCT Blood Glucose.: 137 mg/dL (10 Mar 2025 17:18)    I&O's Summary    10 Mar 2025 07:01  -  11 Mar 2025 07:00  --------------------------------------------------------  IN: 440 mL / OUT: 1895 mL / NET: -1455 mL    11 Mar 2025 07:01  -  11 Mar 2025 14:56  --------------------------------------------------------  IN: 300 mL / OUT: 400 mL / NET: -100 mL        PHYSICAL EXAM:  Vital Signs Last 24 Hrs  T(C): 36.5 (11 Mar 2025 11:33), Max: 36.5 (11 Mar 2025 11:33)  T(F): 97.7 (11 Mar 2025 11:33), Max: 97.7 (11 Mar 2025 11:33)  HR: 72 (11 Mar 2025 11:33) (69 - 75)  BP: 124/64 (11 Mar 2025 11:33) (118/67 - 124/64)  BP(mean): --  RR: 18 (11 Mar 2025 11:33) (18 - 18)  SpO2: 99% (11 Mar 2025 11:33) (95% - 99%)    Parameters below as of 11 Mar 2025 11:33  Patient On (Oxygen Delivery Method): nasal cannula      CONSTITUTIONAL: NAD  EYES: PERRLA; conjunctiva and sclera clear  ENMT: MMM  NECK: Supple  RESPIRATORY: Normal respiratory effort; CTAB  CARDIOVASCULAR: RRR, no JVD, +2 pitting edema  ABDOMEN: Nontender to palpation, normoactive BS, no guarding/rigidity  MUSCLOSKELETAL:  no clubbing/cyanosis, no joint swelling or tenderness to palpation  PSYCH: A+O x 3, affect normal  NEUROLOGY: CN 2-12 are intact and symmetric; no gross sensory or motor deficits  SKIN: No rashes; no palpable lesions    LABS:    03-11    136  |  87[L]  |  95[H]  ----------------------------<  131[H]  2.8[LL]   |  33[H]  |  2.80[H]    Ca    9.2      11 Mar 2025 07:06  Phos  5.5     03-11  Mg     2.5     03-11            Urinalysis Basic - ( 11 Mar 2025 07:06 )    Color: x / Appearance: x / SG: x / pH: x  Gluc: 131 mg/dL / Ketone: x  / Bili: x / Urobili: x   Blood: x / Protein: x / Nitrite: x   Leuk Esterase: x / RBC: x / WBC x   Sq Epi: x / Non Sq Epi: x / Bacteria: x          RADIOLOGY & ADDITIONAL TESTS:  Results Reviewed:   Imaging Personally Reviewed:  Electrocardiogram Personally Reviewed:    COORDINATION OF CARE:  Care Discussed with Consultants/Other Providers [Y/N]: HF  Prior or Outpatient Records Reviewed [Y/N]:

## 2025-03-12 LAB
AMORPH SED URNS QL MICRO: PRESENT
ANION GAP SERPL CALC-SCNC: 16 MMOL/L — SIGNIFICANT CHANGE UP (ref 5–17)
APPEARANCE UR: ABNORMAL
BACTERIA # UR AUTO: ABNORMAL /HPF
BILIRUB UR-MCNC: NEGATIVE — SIGNIFICANT CHANGE UP
BUN SERPL-MCNC: 107 MG/DL — HIGH (ref 7–23)
CALCIUM SERPL-MCNC: 9.1 MG/DL — SIGNIFICANT CHANGE UP (ref 8.4–10.5)
CAST: 2 /LPF — SIGNIFICANT CHANGE UP (ref 0–4)
CHLORIDE SERPL-SCNC: 86 MMOL/L — LOW (ref 96–108)
CO2 SERPL-SCNC: 34 MMOL/L — HIGH (ref 22–31)
COLOR SPEC: YELLOW — SIGNIFICANT CHANGE UP
CREAT SERPL-MCNC: 2.54 MG/DL — HIGH (ref 0.5–1.3)
DIFF PNL FLD: ABNORMAL
EGFR: 25 ML/MIN/1.73M2 — LOW
EGFR: 25 ML/MIN/1.73M2 — LOW
GLUCOSE BLDC GLUCOMTR-MCNC: 105 MG/DL — HIGH (ref 70–99)
GLUCOSE BLDC GLUCOMTR-MCNC: 114 MG/DL — HIGH (ref 70–99)
GLUCOSE BLDC GLUCOMTR-MCNC: 130 MG/DL — HIGH (ref 70–99)
GLUCOSE BLDC GLUCOMTR-MCNC: 70 MG/DL — SIGNIFICANT CHANGE UP (ref 70–99)
GLUCOSE BLDC GLUCOMTR-MCNC: 79 MG/DL — SIGNIFICANT CHANGE UP (ref 70–99)
GLUCOSE BLDC GLUCOMTR-MCNC: 83 MG/DL — SIGNIFICANT CHANGE UP (ref 70–99)
GLUCOSE BLDC GLUCOMTR-MCNC: 99 MG/DL — SIGNIFICANT CHANGE UP (ref 70–99)
GLUCOSE SERPL-MCNC: 114 MG/DL — HIGH (ref 70–99)
GLUCOSE UR QL: NEGATIVE MG/DL — SIGNIFICANT CHANGE UP
HCT VFR BLD CALC: 27.9 % — LOW (ref 39–50)
HGB BLD-MCNC: 8.2 G/DL — LOW (ref 13–17)
KETONES UR-MCNC: NEGATIVE MG/DL — SIGNIFICANT CHANGE UP
LEUKOCYTE ESTERASE UR-ACNC: ABNORMAL
MAGNESIUM SERPL-MCNC: 2.5 MG/DL — SIGNIFICANT CHANGE UP (ref 1.6–2.6)
MCHC RBC-ENTMCNC: 25.6 PG — LOW (ref 27–34)
MCHC RBC-ENTMCNC: 29.4 G/DL — LOW (ref 32–36)
MCV RBC AUTO: 87.2 FL — SIGNIFICANT CHANGE UP (ref 80–100)
NITRITE UR-MCNC: NEGATIVE — SIGNIFICANT CHANGE UP
NRBC BLD AUTO-RTO: 0 /100 WBCS — SIGNIFICANT CHANGE UP (ref 0–0)
PH UR: 5.5 — SIGNIFICANT CHANGE UP (ref 5–8)
PHOSPHATE SERPL-MCNC: 5.1 MG/DL — HIGH (ref 2.5–4.5)
PLATELET # BLD AUTO: 183 K/UL — SIGNIFICANT CHANGE UP (ref 150–400)
POTASSIUM SERPL-MCNC: 3.3 MMOL/L — LOW (ref 3.5–5.3)
POTASSIUM SERPL-SCNC: 3.3 MMOL/L — LOW (ref 3.5–5.3)
PROT UR-MCNC: 30 MG/DL
RBC # BLD: 3.2 M/UL — LOW (ref 4.2–5.8)
RBC # FLD: 20.4 % — HIGH (ref 10.3–14.5)
RBC CASTS # UR COMP ASSIST: 166 /HPF — HIGH (ref 0–4)
REVIEW: SIGNIFICANT CHANGE UP
SODIUM SERPL-SCNC: 136 MMOL/L — SIGNIFICANT CHANGE UP (ref 135–145)
SP GR SPEC: 1.01 — SIGNIFICANT CHANGE UP (ref 1–1.03)
SQUAMOUS # UR AUTO: 2 /HPF — SIGNIFICANT CHANGE UP (ref 0–5)
UROBILINOGEN FLD QL: 0.2 MG/DL — SIGNIFICANT CHANGE UP (ref 0.2–1)
WBC # BLD: 16.14 K/UL — HIGH (ref 3.8–10.5)
WBC # FLD AUTO: 16.14 K/UL — HIGH (ref 3.8–10.5)
WBC CASTS UR QL COMP ASSIST: PRESENT
WBC CLUMPS # UR AUTO: PRESENT
WBC UR QL: >998 /HPF — HIGH (ref 0–5)

## 2025-03-12 PROCEDURE — 74176 CT ABD & PELVIS W/O CONTRAST: CPT | Mod: 26

## 2025-03-12 PROCEDURE — 74018 RADEX ABDOMEN 1 VIEW: CPT | Mod: 26

## 2025-03-12 PROCEDURE — 99232 SBSQ HOSP IP/OBS MODERATE 35: CPT

## 2025-03-12 PROCEDURE — 76705 ECHO EXAM OF ABDOMEN: CPT | Mod: 26

## 2025-03-12 RX ORDER — MEROPENEM 1 G/30ML
500 INJECTION INTRAVENOUS ONCE
Refills: 0 | Status: COMPLETED | OUTPATIENT
Start: 2025-03-12 | End: 2025-03-12

## 2025-03-12 RX ORDER — BISACODYL 5 MG
10 TABLET, DELAYED RELEASE (ENTERIC COATED) ORAL ONCE
Refills: 0 | Status: COMPLETED | OUTPATIENT
Start: 2025-03-12 | End: 2025-03-12

## 2025-03-12 RX ORDER — SODIUM CHLORIDE 3 G/100ML
150 INJECTION, SOLUTION INTRAVENOUS ONCE
Refills: 0 | Status: COMPLETED | OUTPATIENT
Start: 2025-03-12 | End: 2025-03-12

## 2025-03-12 RX ORDER — MEROPENEM 1 G/30ML
INJECTION INTRAVENOUS
Refills: 0 | Status: DISCONTINUED | OUTPATIENT
Start: 2025-03-12 | End: 2025-03-18

## 2025-03-12 RX ORDER — SALINE 7; 19 G/118ML; G/118ML
1 ENEMA RECTAL ONCE
Refills: 0 | Status: COMPLETED | OUTPATIENT
Start: 2025-03-12 | End: 2025-03-12

## 2025-03-12 RX ORDER — MEROPENEM 1 G/30ML
500 INJECTION INTRAVENOUS EVERY 12 HOURS
Refills: 0 | Status: DISCONTINUED | OUTPATIENT
Start: 2025-03-13 | End: 2025-03-18

## 2025-03-12 RX ADMIN — TIOTROPIUM BROMIDE INHALATION SPRAY 2 PUFF(S): 3.12 SPRAY, METERED RESPIRATORY (INHALATION) at 13:43

## 2025-03-12 RX ADMIN — METOPROLOL SUCCINATE 50 MILLIGRAM(S): 50 TABLET, EXTENDED RELEASE ORAL at 09:17

## 2025-03-12 RX ADMIN — ATORVASTATIN CALCIUM 20 MILLIGRAM(S): 80 TABLET, FILM COATED ORAL at 21:25

## 2025-03-12 RX ADMIN — Medication 1 DOSE(S): at 13:42

## 2025-03-12 RX ADMIN — SALINE 1 ENEMA: 7; 19 ENEMA RECTAL at 22:50

## 2025-03-12 RX ADMIN — POLYETHYLENE GLYCOL 3350 17 GRAM(S): 17 POWDER, FOR SOLUTION ORAL at 19:31

## 2025-03-12 RX ADMIN — POLYETHYLENE GLYCOL 3350 17 GRAM(S): 17 POWDER, FOR SOLUTION ORAL at 05:22

## 2025-03-12 RX ADMIN — TAMSULOSIN HYDROCHLORIDE 0.4 MILLIGRAM(S): 0.4 CAPSULE ORAL at 21:25

## 2025-03-12 RX ADMIN — INSULIN GLARGINE-YFGN 18 UNIT(S): 100 INJECTION, SOLUTION SUBCUTANEOUS at 22:31

## 2025-03-12 RX ADMIN — ISOSORBDIE DINITRATE 30 MILLIGRAM(S): 30 TABLET ORAL at 15:21

## 2025-03-12 RX ADMIN — Medication 40 MILLIGRAM(S): at 18:14

## 2025-03-12 RX ADMIN — Medication 1 DOSE(S): at 18:13

## 2025-03-12 RX ADMIN — INSULIN LISPRO 3 UNIT(S): 100 INJECTION, SOLUTION INTRAVENOUS; SUBCUTANEOUS at 18:15

## 2025-03-12 RX ADMIN — Medication 1 PACKET(S): at 13:45

## 2025-03-12 RX ADMIN — Medication 40 MILLIEQUIVALENT(S): at 13:40

## 2025-03-12 RX ADMIN — Medication 10 MILLIEQUIVALENT(S): at 18:16

## 2025-03-12 RX ADMIN — Medication 75 MILLIGRAM(S): at 21:25

## 2025-03-12 RX ADMIN — Medication 100 MILLIGRAM(S): at 13:46

## 2025-03-12 RX ADMIN — Medication 1 SPRAY(S): at 18:13

## 2025-03-12 RX ADMIN — SODIUM CHLORIDE 300 MILLILITER(S): 3 INJECTION, SOLUTION INTRAVENOUS at 15:19

## 2025-03-12 RX ADMIN — Medication 1 SPRAY(S): at 06:38

## 2025-03-12 RX ADMIN — Medication 81 MILLIGRAM(S): at 13:44

## 2025-03-12 RX ADMIN — Medication 40 MILLIGRAM(S): at 05:21

## 2025-03-12 RX ADMIN — SODIUM CHLORIDE 300 MILLILITER(S): 3 INJECTION, SOLUTION INTRAVENOUS at 21:24

## 2025-03-12 RX ADMIN — INSULIN LISPRO 3 UNIT(S): 100 INJECTION, SOLUTION INTRAVENOUS; SUBCUTANEOUS at 09:15

## 2025-03-12 RX ADMIN — INSULIN LISPRO 3 UNIT(S): 100 INJECTION, SOLUTION INTRAVENOUS; SUBCUTANEOUS at 13:31

## 2025-03-12 RX ADMIN — Medication 75 MILLIGRAM(S): at 13:38

## 2025-03-12 RX ADMIN — RIVAROXABAN 15 MILLIGRAM(S): 10 TABLET, FILM COATED ORAL at 18:17

## 2025-03-12 RX ADMIN — ISOSORBDIE DINITRATE 30 MILLIGRAM(S): 30 TABLET ORAL at 05:21

## 2025-03-12 RX ADMIN — MEROPENEM 100 MILLIGRAM(S): 1 INJECTION INTRAVENOUS at 15:17

## 2025-03-12 RX ADMIN — Medication 10 MILLIGRAM(S): at 13:36

## 2025-03-12 RX ADMIN — Medication 40 MILLIEQUIVALENT(S): at 09:23

## 2025-03-12 RX ADMIN — ISOSORBDIE DINITRATE 30 MILLIGRAM(S): 30 TABLET ORAL at 13:42

## 2025-03-12 RX ADMIN — Medication 75 MILLIGRAM(S): at 05:21

## 2025-03-12 RX ADMIN — FOLIC ACID 1 MILLIGRAM(S): 1 TABLET ORAL at 13:44

## 2025-03-12 NOTE — CHART NOTE - NSCHARTNOTEFT_GEN_A_CORE
78 y/o male w/ PMHx CAD s/p CABG, HFrEF (LVIDd 5.2, LVEF 25-30%) w/ DC- ICD, AFib (Xarelto), severe AS s/p TAVR, COPD on 3-4L home O2, CKD3, HTN, HLD, T2DM, and BPH presented to [facility] for ADHF, despite escalation of home diuretics. Now transferred to [facility] for refractory volume overload. His labs notable for stable Scr (though unclear baseline). Remains DNR/DNI per wife - wants the ICD on. Bumex escalated to continuous infusion but remains volume expanded. Pro BNP 30484d up from 8000s on admission. Continue bumex gtt, hypertonic saline BID. c/w isordil 30mg PO TID, increase hydralazine to 100mg PO TID, metoprolol. Will consider DCCV prior to discharge as sinus rhythm w/ BiV pacing may help prevent recurrent CHF exacerbations.       Elis Suarez MD  PGY-5, Cardiology  Available on TEAMS    For all new consults  [email or web address]  Login: [email or web address]

## 2025-03-12 NOTE — PROGRESS NOTE ADULT - PROBLEM SELECTOR PLAN 1
EF 30% with severe tricuspid regurg and PASP 55mmHG on 2/7/24. Dry wt is 243 lbs.   -noted bump in creatine and bicarbonate 3/8.   -nop weight last 48 hour, replaced daily standing weight order.  -will conitnue bumex @ 2 3/8. If worsening bicarb and/or creatinine would bring down diuretics given minimal UOP, and concern for contraction alkalosis.   - GDMT:  Metoprolol succinate 50mg day. Holding entresto and aldactone for DENISHA. Not on SGLT2.  - Hydralazine/Nitrate: hydral 50mg TID. Isordil 20mg TID   - Cardiology following, patient declined RHC and cardiomems  - strict i/o- ro placed  - aaggressively repleat K for hypokalemia

## 2025-03-12 NOTE — PROGRESS NOTE ADULT - SUBJECTIVE AND OBJECTIVE BOX
Contreras Brooks MD  Division of Hospital Medicine  Available on MS teams until 7pm  If no response or off-hours, page [phone]  -------------------------------------    Patient is a 79y old  Male who presents with a chief complaint of HF eval (11 Mar 2025 14:56)      SUBJECTIVE / OVERNIGHT EVENTS: none acute  ADDITIONAL REVIEW OF SYSTEMS: pt notes some abdominal discomfort and swelling, no fevers/chills. Having very small BM's    MEDICATIONS  (STANDING):  allopurinol 100 milliGRAM(s) Oral daily  aspirin enteric coated 81 milliGRAM(s) Oral daily  atorvastatin 20 milliGRAM(s) Oral at bedtime  bisacodyl Suppository 10 milliGRAM(s) Rectal once  buMETAnide Infusion 2 mG/Hr (10 mL/Hr) IV Continuous <Continuous>  dextrose 5%. 1000 milliLiter(s) (50 mL/Hr) IV Continuous <Continuous>  dextrose 5%. 1000 milliLiter(s) (100 mL/Hr) IV Continuous <Continuous>  dextrose 50% Injectable 25 Gram(s) IV Push once  dextrose 50% Injectable 12.5 Gram(s) IV Push once  dextrose 50% Injectable 25 Gram(s) IV Push once  epoetin lashawn (EPOGEN) Injectable 68825 Unit(s) SubCutaneous every 7 days  fluticasone propionate/ salmeterol 250-50 MICROgram(s) Diskus 1 Dose(s) Inhalation two times a day  folic acid 1 milliGRAM(s) Oral daily  glucagon  Injectable 1 milliGRAM(s) IntraMuscular once  hydrALAZINE 75 milliGRAM(s) Oral three times a day  insulin glargine Injectable (LANTUS) 18 Unit(s) SubCutaneous at bedtime  insulin lispro (ADMELOG) corrective regimen sliding scale   SubCutaneous three times a day before meals  insulin lispro Injectable (ADMELOG) 3 Unit(s) SubCutaneous three times a day before meals  isosorbide   dinitrate Tablet (ISORDIL) 30 milliGRAM(s) Oral three times a day  meropenem  IVPB      metoprolol succinate ER 50 milliGRAM(s) Oral daily  pantoprazole    Tablet 40 milliGRAM(s) Oral every 12 hours  polyethylene glycol 3350 17 Gram(s) Oral two times a day  potassium chloride    Tablet ER 40 milliEquivalent(s) Oral every 4 hours  potassium chloride    Tablet ER 10 milliEquivalent(s) Oral daily  psyllium Powder 1 Packet(s) Oral daily  rivaroxaban 15 milliGRAM(s) Oral with dinner  sodium chloride 0.65% Nasal 1 Spray(s) Both Nostrils two times a day  tamsulosin 0.4 milliGRAM(s) Oral at bedtime  tiotropium 2.5 MICROgram(s) Inhaler 2 Puff(s) Inhalation daily    MEDICATIONS  (PRN):  acetaminophen     Tablet .. 650 milliGRAM(s) Oral every 6 hours PRN Temp greater or equal to 38C (100.4F), Mild Pain (1 - 3)  albuterol/ipratropium for Nebulization 3 milliLiter(s) Nebulizer every 6 hours PRN Shortness of Breath and/or Wheezing  bisacodyl 5 milliGRAM(s) Oral every 12 hours PRN Constipation  dextrose Oral Gel 15 Gram(s) Oral once PRN Blood Glucose LESS THAN 70 milliGRAM(s)/deciliter  dextrose Oral Gel 15 Gram(s) Oral once PRN Blood Glucose LESS THAN 70 milliGRAM(s)/deciliter  melatonin 3 milliGRAM(s) Oral at bedtime PRN Insomnia      CAPILLARY BLOOD GLUCOSE      POCT Blood Glucose.: 114 mg/dL (12 Mar 2025 12:44)  POCT Blood Glucose.: 130 mg/dL (12 Mar 2025 08:44)  POCT Blood Glucose.: 137 mg/dL (11 Mar 2025 21:20)  POCT Blood Glucose.: 166 mg/dL (11 Mar 2025 17:27)    I&O's Summary    11 Mar 2025 07:01  -  12 Mar 2025 07:00  --------------------------------------------------------  IN: 300 mL / OUT: 2500 mL / NET: -2200 mL    12 Mar 2025 07:01  -  12 Mar 2025 13:21  --------------------------------------------------------  IN: 180 mL / OUT: 0 mL / NET: 180 mL        PHYSICAL EXAM:  Vital Signs Last 24 Hrs  T(C): 36.4 (12 Mar 2025 11:20), Max: 36.7 (11 Mar 2025 20:50)  T(F): 97.6 (12 Mar 2025 11:20), Max: 98 (11 Mar 2025 20:50)  HR: 86 (12 Mar 2025 11:20) (73 - 104)  BP: 119/65 (12 Mar 2025 11:20) (112/84 - 130/73)  BP(mean): --  RR: 18 (12 Mar 2025 11:20) (18 - 18)  SpO2: 93% (12 Mar 2025 11:20) (93% - 100%)    Parameters below as of 12 Mar 2025 11:20  Patient On (Oxygen Delivery Method): nasal cannula      CONSTITUTIONAL: NAD  EYES: PERRLA; conjunctiva and sclera clear  ENMT: MMM  NECK: Supple  RESPIRATORY: Normal respiratory effort; CTAB  CARDIOVASCULAR: RRR, no JVD, +2 pitting edema, unchanged  ABDOMEN: +distended, no significant tenderness  MUSCLOSKELETAL:  no clubbing/cyanosis, no joint swelling or tenderness to palpation  PSYCH: A+O x 2-3, affect normal  NEUROLOGY: CN 2-12 are intact and symmetric; no gross sensory or motor deficits  SKIN: No rashes; no palpable lesions    LABS:                        8.2    16.14 )-----------( 183      ( 12 Mar 2025 06:54 )             27.9     03-12    136  |  86[L]  |  107[H]  ----------------------------<  114[H]  3.3[L]   |  34[H]  |  2.54[H]    Ca    9.1      12 Mar 2025 06:52  Phos  5.1     03-12  Mg     2.5     03-12            Urinalysis Basic - ( 12 Mar 2025 06:52 )    Color: x / Appearance: x / SG: x / pH: x  Gluc: 114 mg/dL / Ketone: x  / Bili: x / Urobili: x   Blood: x / Protein: x / Nitrite: x   Leuk Esterase: x / RBC: x / WBC x   Sq Epi: x / Non Sq Epi: x / Bacteria: x          RADIOLOGY & ADDITIONAL TESTS:  Results Reviewed:   Imaging Personally Reviewed:  Electrocardiogram Personally Reviewed:    COORDINATION OF CARE:  Care Discussed with Consultants/Other Providers [Y/N]:  Prior or Outpatient Records Reviewed [Y/N]:

## 2025-03-12 NOTE — PROGRESS NOTE ADULT - ASSESSMENT
80 y/o male w/ PMHx CAD s/p CABG, HF (combined HFrEF [EF 25%] and HFpEF [G3DD]) w/ ICD, AFib on Xarelto, severe AS s/p TAVR, COPD on 3-4L home O2, CKD4, HTN, HLD, T2DM, and BPH who presents as a transfer from [facility] for HF evaluation. Ongoing CHF exacerbation now off bumex gtt, new DENISHA on CKD

## 2025-03-12 NOTE — PROGRESS NOTE ADULT - SUBJECTIVE AND OBJECTIVE BOX
No distress, on NC O2    Vital Signs Last 24 Hrs  T(C): 36.4 (03-12-25 @ 11:20), Max: 36.7 (03-11-25 @ 20:50)  T(F): 97.6 (03-12-25 @ 11:20), Max: 98 (03-11-25 @ 20:50)  HR: 86 (03-12-25 @ 11:20) (73 - 104)  BP: 119/65 (03-12-25 @ 11:20) (112/84 - 130/73)  RR: 18 (03-12-25 @ 11:20) (18 - 18)  SpO2: 93% (03-12-25 @ 11:20) (93% - 100%)    I&O's Detail    11 Mar 2025 07:01  -  12 Mar 2025 07:00  --------------------------------------------------------  OUT:    Indwelling Catheter - Urethral (mL): 1650 mL    Voided (mL): 850 mL  Total OUT: 2500 mL    12 Mar 2025 07:01  -  12 Mar 2025 16:55  --------------------------------------------------------  OUT:    Indwelling Catheter - Urethral (mL): 400 mL  Total OUT: 400 mL    s1s2  b/l air entry  soft, ascites   edema                                                                                                8.2    16.14 )-----------( 183      ( 12 Mar 2025 06:54 )             27.9     12 Mar 2025 06:52    136    |  86     |  107    ----------------------------<  114    3.3     |  34     |  2.54     Ca    9.1        12 Mar 2025 06:52  Phos  5.1       12 Mar 2025 06:52  Mg     2.5       12 Mar 2025 06:52    acetaminophen     Tablet .. 650 milliGRAM(s) Oral every 6 hours PRN  albuterol/ipratropium for Nebulization 3 milliLiter(s) Nebulizer every 6 hours PRN  allopurinol 100 milliGRAM(s) Oral daily  aspirin enteric coated 81 milliGRAM(s) Oral daily  atorvastatin 20 milliGRAM(s) Oral at bedtime  bisacodyl 5 milliGRAM(s) Oral every 12 hours PRN  buMETAnide Infusion 2 mG/Hr IV Continuous <Continuous>  dextrose 5%. 1000 milliLiter(s) IV Continuous <Continuous>  dextrose 5%. 1000 milliLiter(s) IV Continuous <Continuous>  dextrose 50% Injectable 25 Gram(s) IV Push once  dextrose 50% Injectable 12.5 Gram(s) IV Push once  dextrose 50% Injectable 25 Gram(s) IV Push once  dextrose Oral Gel 15 Gram(s) Oral once PRN  dextrose Oral Gel 15 Gram(s) Oral once PRN  epoetin lashawn (EPOGEN) Injectable 72049 Unit(s) SubCutaneous every 7 days  fluticasone propionate/ salmeterol 250-50 MICROgram(s) Diskus 1 Dose(s) Inhalation two times a day  folic acid 1 milliGRAM(s) Oral daily  glucagon  Injectable 1 milliGRAM(s) IntraMuscular once  hydrALAZINE 75 milliGRAM(s) Oral three times a day  insulin glargine Injectable (LANTUS) 18 Unit(s) SubCutaneous at bedtime  insulin lispro (ADMELOG) corrective regimen sliding scale   SubCutaneous three times a day before meals  insulin lispro Injectable (ADMELOG) 3 Unit(s) SubCutaneous three times a day before meals  isosorbide   dinitrate Tablet (ISORDIL) 30 milliGRAM(s) Oral three times a day  melatonin 3 milliGRAM(s) Oral at bedtime PRN  meropenem  IVPB      metoprolol succinate ER 50 milliGRAM(s) Oral daily  pantoprazole    Tablet 40 milliGRAM(s) Oral every 12 hours  polyethylene glycol 3350 17 Gram(s) Oral two times a day  potassium chloride    Tablet ER 10 milliEquivalent(s) Oral daily  psyllium Powder 1 Packet(s) Oral daily  rivaroxaban 15 milliGRAM(s) Oral with dinner  sodium chloride 0.65% Nasal 1 Spray(s) Both Nostrils two times a day  sodium chloride 3% Bolus 150 milliLiter(s) IV Bolus once  tamsulosin 0.4 milliGRAM(s) Oral at bedtime  tiotropium 2.5 MICROgram(s) Inhaler 2 Puff(s) Inhalation daily    A/P:    CM, EF ~ 30%, severe TR  Adm to [facility] VS 1/10 w/fluid overload   Tx to [facility] 2/5 for further management   Known CKD (baseline Cr ~ 2. - 2.5)  Hemodynamic/cardio-renal DENISHA/CKD   Diuresis per HF team   No objection to paracentesis   F/u BMP, Mg, UO  Suppl K  Avoid nephrotoxins as possible   Prognosis is guarded   No HD if/when became necessary, per d/w pt and family     [phone]

## 2025-03-13 LAB
ANION GAP SERPL CALC-SCNC: 17 MMOL/L — SIGNIFICANT CHANGE UP (ref 5–17)
BUN SERPL-MCNC: 105 MG/DL — HIGH (ref 7–23)
CALCIUM SERPL-MCNC: 8.9 MG/DL — SIGNIFICANT CHANGE UP (ref 8.4–10.5)
CHLORIDE SERPL-SCNC: 89 MMOL/L — LOW (ref 96–108)
CO2 SERPL-SCNC: 31 MMOL/L — SIGNIFICANT CHANGE UP (ref 22–31)
CREAT SERPL-MCNC: 2.63 MG/DL — HIGH (ref 0.5–1.3)
EGFR: 24 ML/MIN/1.73M2 — LOW
EGFR: 24 ML/MIN/1.73M2 — LOW
GLUCOSE BLDC GLUCOMTR-MCNC: 154 MG/DL — HIGH (ref 70–99)
GLUCOSE BLDC GLUCOMTR-MCNC: 206 MG/DL — HIGH (ref 70–99)
GLUCOSE BLDC GLUCOMTR-MCNC: 225 MG/DL — HIGH (ref 70–99)
GLUCOSE BLDC GLUCOMTR-MCNC: 93 MG/DL — SIGNIFICANT CHANGE UP (ref 70–99)
GLUCOSE SERPL-MCNC: 48 MG/DL — CRITICAL LOW (ref 70–99)
HCT VFR BLD CALC: 29.1 % — LOW (ref 39–50)
HGB BLD-MCNC: 8.2 G/DL — LOW (ref 13–17)
MAGNESIUM SERPL-MCNC: 2.6 MG/DL — SIGNIFICANT CHANGE UP (ref 1.6–2.6)
MCHC RBC-ENTMCNC: 24.9 PG — LOW (ref 27–34)
MCHC RBC-ENTMCNC: 28.2 G/DL — LOW (ref 32–36)
MCV RBC AUTO: 88.4 FL — SIGNIFICANT CHANGE UP (ref 80–100)
NRBC BLD AUTO-RTO: 0 /100 WBCS — SIGNIFICANT CHANGE UP (ref 0–0)
PHOSPHATE SERPL-MCNC: 4.6 MG/DL — HIGH (ref 2.5–4.5)
PLATELET # BLD AUTO: 180 K/UL — SIGNIFICANT CHANGE UP (ref 150–400)
POTASSIUM SERPL-MCNC: 3.9 MMOL/L — SIGNIFICANT CHANGE UP (ref 3.5–5.3)
POTASSIUM SERPL-SCNC: 3.9 MMOL/L — SIGNIFICANT CHANGE UP (ref 3.5–5.3)
RBC # BLD: 3.29 M/UL — LOW (ref 4.2–5.8)
RBC # FLD: 20.4 % — HIGH (ref 10.3–14.5)
SODIUM SERPL-SCNC: 137 MMOL/L — SIGNIFICANT CHANGE UP (ref 135–145)
WBC # BLD: 16.86 K/UL — HIGH (ref 3.8–10.5)
WBC # FLD AUTO: 16.86 K/UL — HIGH (ref 3.8–10.5)

## 2025-03-13 PROCEDURE — 99233 SBSQ HOSP IP/OBS HIGH 50: CPT

## 2025-03-13 PROCEDURE — 99233 SBSQ HOSP IP/OBS HIGH 50: CPT | Mod: GC

## 2025-03-13 PROCEDURE — 93010 ELECTROCARDIOGRAM REPORT: CPT

## 2025-03-13 RX ORDER — SODIUM CHLORIDE 3 G/100ML
150 INJECTION, SOLUTION INTRAVENOUS
Refills: 0 | Status: DISCONTINUED | OUTPATIENT
Start: 2025-03-13 | End: 2025-03-14

## 2025-03-13 RX ORDER — CHLOROTHIAZIDE 250 MG/1
500 TABLET ORAL ONCE
Refills: 0 | Status: COMPLETED | OUTPATIENT
Start: 2025-03-13 | End: 2025-03-13

## 2025-03-13 RX ADMIN — CHLOROTHIAZIDE 100 MILLIGRAM(S): 250 TABLET ORAL at 18:03

## 2025-03-13 RX ADMIN — Medication 40 MILLIGRAM(S): at 18:08

## 2025-03-13 RX ADMIN — METOPROLOL SUCCINATE 50 MILLIGRAM(S): 50 TABLET, EXTENDED RELEASE ORAL at 10:18

## 2025-03-13 RX ADMIN — ISOSORBDIE DINITRATE 30 MILLIGRAM(S): 30 TABLET ORAL at 21:34

## 2025-03-13 RX ADMIN — INSULIN GLARGINE-YFGN 18 UNIT(S): 100 INJECTION, SOLUTION SUBCUTANEOUS at 21:46

## 2025-03-13 RX ADMIN — Medication 1 PACKET(S): at 11:54

## 2025-03-13 RX ADMIN — TAMSULOSIN HYDROCHLORIDE 0.4 MILLIGRAM(S): 0.4 CAPSULE ORAL at 21:34

## 2025-03-13 RX ADMIN — MEROPENEM 100 MILLIGRAM(S): 1 INJECTION INTRAVENOUS at 17:21

## 2025-03-13 RX ADMIN — Medication 1 DOSE(S): at 19:37

## 2025-03-13 RX ADMIN — Medication 81 MILLIGRAM(S): at 11:53

## 2025-03-13 RX ADMIN — Medication 75 MILLIGRAM(S): at 22:55

## 2025-03-13 RX ADMIN — Medication 40 MILLIGRAM(S): at 05:43

## 2025-03-13 RX ADMIN — Medication 1 SPRAY(S): at 19:36

## 2025-03-13 RX ADMIN — MEROPENEM 100 MILLIGRAM(S): 1 INJECTION INTRAVENOUS at 05:58

## 2025-03-13 RX ADMIN — INSULIN LISPRO 2: 100 INJECTION, SOLUTION INTRAVENOUS; SUBCUTANEOUS at 12:46

## 2025-03-13 RX ADMIN — Medication 75 MILLIGRAM(S): at 16:46

## 2025-03-13 RX ADMIN — POLYETHYLENE GLYCOL 3350 17 GRAM(S): 17 POWDER, FOR SOLUTION ORAL at 05:44

## 2025-03-13 RX ADMIN — SODIUM CHLORIDE 300 MILLILITER(S): 3 INJECTION, SOLUTION INTRAVENOUS at 17:59

## 2025-03-13 RX ADMIN — ISOSORBDIE DINITRATE 30 MILLIGRAM(S): 30 TABLET ORAL at 10:17

## 2025-03-13 RX ADMIN — Medication 10 MILLIEQUIVALENT(S): at 11:53

## 2025-03-13 RX ADMIN — Medication 1 SPRAY(S): at 05:44

## 2025-03-13 RX ADMIN — ISOSORBDIE DINITRATE 30 MILLIGRAM(S): 30 TABLET ORAL at 05:43

## 2025-03-13 RX ADMIN — Medication 5 MILLIGRAM(S): at 06:48

## 2025-03-13 RX ADMIN — Medication 1 DOSE(S): at 05:44

## 2025-03-13 RX ADMIN — FOLIC ACID 1 MILLIGRAM(S): 1 TABLET ORAL at 11:53

## 2025-03-13 RX ADMIN — INSULIN LISPRO 4: 100 INJECTION, SOLUTION INTRAVENOUS; SUBCUTANEOUS at 18:01

## 2025-03-13 RX ADMIN — TIOTROPIUM BROMIDE INHALATION SPRAY 2 PUFF(S): 3.12 SPRAY, METERED RESPIRATORY (INHALATION) at 12:04

## 2025-03-13 RX ADMIN — Medication 75 MILLIGRAM(S): at 05:43

## 2025-03-13 RX ADMIN — ATORVASTATIN CALCIUM 20 MILLIGRAM(S): 80 TABLET, FILM COATED ORAL at 21:34

## 2025-03-13 RX ADMIN — IPRATROPIUM BROMIDE AND ALBUTEROL SULFATE 3 MILLILITER(S): .5; 2.5 SOLUTION RESPIRATORY (INHALATION) at 21:47

## 2025-03-13 RX ADMIN — Medication 100 MILLIGRAM(S): at 11:53

## 2025-03-13 NOTE — PROGRESS NOTE ADULT - ASSESSMENT
78 y/o male w/ PMHx CAD s/p CABG, HF (combined HFrEF [EF 25%] and HFpEF [G3DD]) w/ ICD, AFib on Xarelto, severe AS s/p TAVR, COPD on 3-4L home O2, CKD4, HTN, HLD, T2DM, and BPH who presents as a transfer from [facility] for HF evaluation. Ongoing CHF exacerbation now off bumex gtt, new DENISHA on CKD

## 2025-03-13 NOTE — PROGRESS NOTE ADULT - SUBJECTIVE AND OBJECTIVE BOX
Contreras Brooks MD  Division of Hospital Medicine  Available on MS teams until 7pm  If no response or off-hours, page [phone]  -------------------------------------    Patient is a 79y old  Male who presents with a chief complaint of HF eval (12 Mar 2025 16:54)      SUBJECTIVE / OVERNIGHT EVENTS: none acute  ADDITIONAL REVIEW OF SYSTEMS: had 2 large/hard BM's overnight, feels a bit better, no fevers/chills, still notes ongoing abdominal discomfort    MEDICATIONS  (STANDING):  allopurinol 100 milliGRAM(s) Oral daily  aspirin enteric coated 81 milliGRAM(s) Oral daily  atorvastatin 20 milliGRAM(s) Oral at bedtime  buMETAnide Infusion 2 mG/Hr (10 mL/Hr) IV Continuous <Continuous>  dextrose 5%. 1000 milliLiter(s) (50 mL/Hr) IV Continuous <Continuous>  dextrose 5%. 1000 milliLiter(s) (100 mL/Hr) IV Continuous <Continuous>  dextrose 50% Injectable 25 Gram(s) IV Push once  dextrose 50% Injectable 12.5 Gram(s) IV Push once  dextrose 50% Injectable 25 Gram(s) IV Push once  epoetin lashawn (EPOGEN) Injectable 48808 Unit(s) SubCutaneous every 7 days  fluticasone propionate/ salmeterol 250-50 MICROgram(s) Diskus 1 Dose(s) Inhalation two times a day  folic acid 1 milliGRAM(s) Oral daily  glucagon  Injectable 1 milliGRAM(s) IntraMuscular once  hydrALAZINE 75 milliGRAM(s) Oral three times a day  insulin glargine Injectable (LANTUS) 18 Unit(s) SubCutaneous at bedtime  insulin lispro (ADMELOG) corrective regimen sliding scale   SubCutaneous three times a day before meals  insulin lispro Injectable (ADMELOG) 3 Unit(s) SubCutaneous three times a day before meals  isosorbide   dinitrate Tablet (ISORDIL) 30 milliGRAM(s) Oral three times a day  meropenem  IVPB      meropenem  IVPB 500 milliGRAM(s) IV Intermittent every 12 hours  metoprolol succinate ER 50 milliGRAM(s) Oral daily  pantoprazole    Tablet 40 milliGRAM(s) Oral every 12 hours  polyethylene glycol 3350 17 Gram(s) Oral two times a day  potassium chloride    Tablet ER 10 milliEquivalent(s) Oral daily  psyllium Powder 1 Packet(s) Oral daily  sodium chloride 0.65% Nasal 1 Spray(s) Both Nostrils two times a day  tamsulosin 0.4 milliGRAM(s) Oral at bedtime  tiotropium 2.5 MICROgram(s) Inhaler 2 Puff(s) Inhalation daily    MEDICATIONS  (PRN):  acetaminophen     Tablet .. 650 milliGRAM(s) Oral every 6 hours PRN Temp greater or equal to 38C (100.4F), Mild Pain (1 - 3)  albuterol/ipratropium for Nebulization 3 milliLiter(s) Nebulizer every 6 hours PRN Shortness of Breath and/or Wheezing  bisacodyl 5 milliGRAM(s) Oral every 12 hours PRN Constipation  dextrose Oral Gel 15 Gram(s) Oral once PRN Blood Glucose LESS THAN 70 milliGRAM(s)/deciliter  dextrose Oral Gel 15 Gram(s) Oral once PRN Blood Glucose LESS THAN 70 milliGRAM(s)/deciliter  melatonin 3 milliGRAM(s) Oral at bedtime PRN Insomnia      CAPILLARY BLOOD GLUCOSE      POCT Blood Glucose.: 154 mg/dL (13 Mar 2025 12:42)  POCT Blood Glucose.: 93 mg/dL (13 Mar 2025 08:45)  POCT Blood Glucose.: 105 mg/dL (12 Mar 2025 22:24)  POCT Blood Glucose.: 99 mg/dL (12 Mar 2025 22:10)  POCT Blood Glucose.: 83 mg/dL (12 Mar 2025 21:56)  POCT Blood Glucose.: 70 mg/dL (12 Mar 2025 21:29)  POCT Blood Glucose.: 79 mg/dL (12 Mar 2025 17:26)    I&O's Summary    12 Mar 2025 07:01  -  13 Mar 2025 07:00  --------------------------------------------------------  IN: 540 mL / OUT: 1175 mL / NET: -635 mL    13 Mar 2025 07:01  -  13 Mar 2025 13:17  --------------------------------------------------------  IN: 120 mL / OUT: 0 mL / NET: 120 mL        PHYSICAL EXAM:  Vital Signs Last 24 Hrs  T(C): 36.6 (13 Mar 2025 11:28), Max: 36.6 (13 Mar 2025 11:28)  T(F): 97.8 (13 Mar 2025 11:28), Max: 97.8 (13 Mar 2025 11:28)  HR: 76 (13 Mar 2025 11:28) (68 - 76)  BP: 125/65 (13 Mar 2025 11:28) (103/64 - 125/65)  BP(mean): --  RR: 18 (13 Mar 2025 11:28) (18 - 18)  SpO2: 100% (13 Mar 2025 11:28) (97% - 100%)    Parameters below as of 13 Mar 2025 11:28  Patient On (Oxygen Delivery Method): nasal cannula      CONSTITUTIONAL: NAD  EYES: PERRLA; conjunctiva and sclera clear  ENMT: MMM  NECK: Supple  RESPIRATORY: dec breath sounds bl  CARDIOVASCULAR: RRR, no JVD, +2 pitting edema  ABDOMEN: distended abd, soft nontender  MUSCLOSKELETAL:  no clubbing/cyanosis, no joint swelling or tenderness to palpation  PSYCH: A+O x 3, affect normal  NEUROLOGY: CN 2-12 are intact and symmetric; no gross sensory or motor deficits  SKIN: No rashes; no palpable lesions    LABS:                        8.2    16.14 )-----------( 183      ( 12 Mar 2025 06:54 )             27.9     03-13    137  |  89[L]  |  105[H]  ----------------------------<  48[LL]  3.9   |  31  |  2.63[H]    Ca    8.9      13 Mar 2025 07:13  Phos  4.6     03-13  Mg     2.6     03-13            Urinalysis Basic - ( 13 Mar 2025 07:13 )    Color: x / Appearance: x / SG: x / pH: x  Gluc: 48 mg/dL / Ketone: x  / Bili: x / Urobili: x   Blood: x / Protein: x / Nitrite: x   Leuk Esterase: x / RBC: x / WBC x   Sq Epi: x / Non Sq Epi: x / Bacteria: x          RADIOLOGY & ADDITIONAL TESTS:  Results Reviewed:   Imaging Personally Reviewed:  Electrocardiogram Personally Reviewed:    COORDINATION OF CARE:  Care Discussed with Consultants/Other Providers [Y/N]:  Prior or Outpatient Records Reviewed [Y/N]:

## 2025-03-13 NOTE — PROGRESS NOTE ADULT - SUBJECTIVE AND OBJECTIVE BOX
Overnight Events:     Review Of Systems: No chest pain, shortness of breath, or palpitations            Current Meds:  acetaminophen     Tablet .. 650 milliGRAM(s) Oral every 6 hours PRN  albuterol/ipratropium for Nebulization 3 milliLiter(s) Nebulizer every 6 hours PRN  allopurinol 100 milliGRAM(s) Oral daily  aspirin enteric coated 81 milliGRAM(s) Oral daily  atorvastatin 20 milliGRAM(s) Oral at bedtime  bisacodyl 5 milliGRAM(s) Oral every 12 hours PRN  buMETAnide Infusion 2 mG/Hr IV Continuous <Continuous>  dextrose 5%. 1000 milliLiter(s) IV Continuous <Continuous>  dextrose 5%. 1000 milliLiter(s) IV Continuous <Continuous>  dextrose 50% Injectable 25 Gram(s) IV Push once  dextrose 50% Injectable 12.5 Gram(s) IV Push once  dextrose 50% Injectable 25 Gram(s) IV Push once  dextrose Oral Gel 15 Gram(s) Oral once PRN  dextrose Oral Gel 15 Gram(s) Oral once PRN  epoetin lashawn (EPOGEN) Injectable 73861 Unit(s) SubCutaneous every 7 days  fluticasone propionate/ salmeterol 250-50 MICROgram(s) Diskus 1 Dose(s) Inhalation two times a day  folic acid 1 milliGRAM(s) Oral daily  glucagon  Injectable 1 milliGRAM(s) IntraMuscular once  hydrALAZINE 75 milliGRAM(s) Oral three times a day  insulin glargine Injectable (LANTUS) 18 Unit(s) SubCutaneous at bedtime  insulin lispro (ADMELOG) corrective regimen sliding scale   SubCutaneous three times a day before meals  insulin lispro Injectable (ADMELOG) 3 Unit(s) SubCutaneous three times a day before meals  isosorbide   dinitrate Tablet (ISORDIL) 30 milliGRAM(s) Oral three times a day  melatonin 3 milliGRAM(s) Oral at bedtime PRN  meropenem  IVPB      meropenem  IVPB 500 milliGRAM(s) IV Intermittent every 12 hours  metoprolol succinate ER 50 milliGRAM(s) Oral daily  pantoprazole    Tablet 40 milliGRAM(s) Oral every 12 hours  polyethylene glycol 3350 17 Gram(s) Oral two times a day  potassium chloride    Tablet ER 10 milliEquivalent(s) Oral daily  psyllium Powder 1 Packet(s) Oral daily  sodium chloride 0.65% Nasal 1 Spray(s) Both Nostrils two times a day  tamsulosin 0.4 milliGRAM(s) Oral at bedtime  tiotropium 2.5 MICROgram(s) Inhaler 2 Puff(s) Inhalation daily      Vitals:  T(F): 97.8 (03-13), Max: 97.8 (03-13)  HR: 76 (03-13) (68 - 76)  BP: 125/65 (03-13) (103/64 - 125/65)  RR: 18 (03-13)  SpO2: 100% (03-13)  I&O's Summary    12 Mar 2025 07:01  -  13 Mar 2025 07:00  --------------------------------------------------------  IN: 540 mL / OUT: 1175 mL / NET: -635 mL    13 Mar 2025 07:01  -  13 Mar 2025 13:46  --------------------------------------------------------  IN: 120 mL / OUT: 0 mL / NET: 120 mL        Physical Exam:  GEN: comfortable appearing, lying in bed in NAD  HEENT: NCAT, MMM  CV: Regular S1, S2, no m/r/g  RESP: CTAB  ABD: Soft, NTND, +BS  EXT: No LE edema, WWP, pulses palpable throughout  NEURO: No focal deficits, AOx3  SKIN:  No rashes                          8.2    16.14 )-----------( 183      ( 12 Mar 2025 06:54 )             27.9     03-13    137  |  89[L]  |  105[H]  ----------------------------<  48[LL]  3.9   |  31  |  2.63[H]    Ca    8.9      13 Mar 2025 07:13  Phos  4.6     03-13  Mg     2.6     03-13                       Overnight Events: NAEO    Review Of Systems: No chest pain, shortness of breath, or palpitations            Current Meds:  acetaminophen     Tablet .. 650 milliGRAM(s) Oral every 6 hours PRN  albuterol/ipratropium for Nebulization 3 milliLiter(s) Nebulizer every 6 hours PRN  allopurinol 100 milliGRAM(s) Oral daily  aspirin enteric coated 81 milliGRAM(s) Oral daily  atorvastatin 20 milliGRAM(s) Oral at bedtime  bisacodyl 5 milliGRAM(s) Oral every 12 hours PRN  buMETAnide Infusion 2 mG/Hr IV Continuous <Continuous>  dextrose 5%. 1000 milliLiter(s) IV Continuous <Continuous>  dextrose 5%. 1000 milliLiter(s) IV Continuous <Continuous>  dextrose 50% Injectable 25 Gram(s) IV Push once  dextrose 50% Injectable 12.5 Gram(s) IV Push once  dextrose 50% Injectable 25 Gram(s) IV Push once  dextrose Oral Gel 15 Gram(s) Oral once PRN  dextrose Oral Gel 15 Gram(s) Oral once PRN  epoetin lashawn (EPOGEN) Injectable 03304 Unit(s) SubCutaneous every 7 days  fluticasone propionate/ salmeterol 250-50 MICROgram(s) Diskus 1 Dose(s) Inhalation two times a day  folic acid 1 milliGRAM(s) Oral daily  glucagon  Injectable 1 milliGRAM(s) IntraMuscular once  hydrALAZINE 75 milliGRAM(s) Oral three times a day  insulin glargine Injectable (LANTUS) 18 Unit(s) SubCutaneous at bedtime  insulin lispro (ADMELOG) corrective regimen sliding scale   SubCutaneous three times a day before meals  insulin lispro Injectable (ADMELOG) 3 Unit(s) SubCutaneous three times a day before meals  isosorbide   dinitrate Tablet (ISORDIL) 30 milliGRAM(s) Oral three times a day  melatonin 3 milliGRAM(s) Oral at bedtime PRN  meropenem  IVPB      meropenem  IVPB 500 milliGRAM(s) IV Intermittent every 12 hours  metoprolol succinate ER 50 milliGRAM(s) Oral daily  pantoprazole    Tablet 40 milliGRAM(s) Oral every 12 hours  polyethylene glycol 3350 17 Gram(s) Oral two times a day  potassium chloride    Tablet ER 10 milliEquivalent(s) Oral daily  psyllium Powder 1 Packet(s) Oral daily  sodium chloride 0.65% Nasal 1 Spray(s) Both Nostrils two times a day  tamsulosin 0.4 milliGRAM(s) Oral at bedtime  tiotropium 2.5 MICROgram(s) Inhaler 2 Puff(s) Inhalation daily      Vitals:  T(F): 97.8 (03-13), Max: 97.8 (03-13)  HR: 76 (03-13) (68 - 76)  BP: 125/65 (03-13) (103/64 - 125/65)  RR: 18 (03-13)  SpO2: 100% (03-13)  I&O's Summary    12 Mar 2025 07:01  -  13 Mar 2025 07:00  --------------------------------------------------------  IN: 540 mL / OUT: 1175 mL / NET: -635 mL    13 Mar 2025 07:01  -  13 Mar 2025 13:46  --------------------------------------------------------  IN: 120 mL / OUT: 0 mL / NET: 120 mL        Physical Exam:  GEN: comfortable appearing, lying in bed in NAD  HEENT: NCAT, MMM  CV: Regular S1, S2, no m/r/g  RESP: CTAB  ABD: Soft, distended w/ ascites  EXT: 3+ diffuse Bl LE+UE edema, WWP, pulses palpable throughout  NEURO: No focal deficits, AOx3  SKIN:  No rashes                          8.2    16.14 )-----------( 183      ( 12 Mar 2025 06:54 )             27.9     03-13    137  |  89[L]  |  105[H]  ----------------------------<  48[LL]  3.9   |  31  |  2.63[H]    Ca    8.9      13 Mar 2025 07:13  Phos  4.6     03-13  Mg     2.6     03-13

## 2025-03-13 NOTE — CONSULT NOTE ADULT - SUBJECTIVE AND OBJECTIVE BOX
Interventional Radiology    Evaluate for Procedure: Paracentesis    HPI: 78 y/o male w/ PMHx CAD s/p CABG, HF (combined HFrEF [EF 25%] and HFpEF [G3DD]) w/ ICD, AFib on Xarelto, severe AS s/p TAVR, COPD on 3-4L home O2, CKD4, HTN, HLD, T2DM, and BPH who presents as a transfer from [facility] for HF evaluation. Ongoing CHF exacerbation now off bumex gtt, new DENISHA on CKD. Patient found to have mod ascites, IR consulted for dx/tx paracentesis.     Allergies: No Known Allergies    Medications (Abx/Cardiac/Anticoagulation/Blood Products)  aspirin enteric coated: 81 milliGRAM(s) Oral ( @ 11:53)  chlorothiazide IVPB: 100 mL/Hr IV Intermittent ( @ 22:55)  hydrALAZINE: 75 milliGRAM(s) Oral ( @ 05:43)  isosorbide   dinitrate Tablet (ISORDIL): 30 milliGRAM(s) Oral ( @ 10:17)  meropenem  IVPB: 100 mL/Hr IV Intermittent ( @ 15:17)  meropenem  IVPB: 100 mL/Hr IV Intermittent ( @ 05:58)  metoprolol succinate ER: 50 milliGRAM(s) Oral ( @ 10:18)  rivaroxaban: 15 milliGRAM(s) Oral ( @ 18:17)    Data:  T(C): 36.6  HR: 76  BP: 125/65  RR: 18  SpO2: 100%    -WBC 16.14 / HgB 8.2 / Hct 27.9 / Plt 183  -Na 137 / Cl 89 /  / Glucose 48  -K 3.9 / CO2 31 / Cr 2.63  -ALT -- / Alk Phos -- / T.Bili --  -INR 2.20 / PTT 34.1    Radiology:     Assessment/Plan: 78 y/o male w/ PMHx CAD s/p CABG, HF (combined HFrEF [EF 25%] and HFpEF [G3DD]) w/ ICD, AFib on Xarelto, severe AS s/p TAVR, COPD on 3-4L home O2, CKD4, HTN, HLD, T2DM, and BPH who presents as a transfer from [facility] for HF evaluation. Ongoing CHF exacerbation now off bumex gtt, new DENISHA on CKD. Patient found to have mod ascites, IR consulted for dx/tx paracentesis.     -Will plan for Diagnostic and therapeutic paracentesis tomorrow.   -Please place IR procedure order under ELIZABETH Zimmer  -Ok to feed patient from IR stand point   -STAT am labs  -Hold Xarelto x 24hrs prior to procedure and tentative plan for resumption 12-24 hrs post procedure. ok to continue asa.   -Maintain active T&S  -Above d/w primary team      Any questions or concerns regarding above please reach out to IR:   -Available on [employer] teams  -During working hours (7a-5p): call [phone]  -Emergent issues after 5pm: page: [phone]  -Non-emergent consults: Please place a [facility] order "IR Consult" with an appropriate callback number  -Scheduling questions: [phone]  -Clinic/Outpatient [phone]

## 2025-03-13 NOTE — PROGRESS NOTE ADULT - ASSESSMENT
78 y/o male w/ PMHx CAD s/p CABG, HFrEF (LVIDd 5.2, LVEF 25-30%) w/ DC- ICD, AFib (Xarelto), severe AS s/p TAVR, COPD on 3-4L home O2, CKD3, HTN, HLD, T2DM, and BPH presented to [facility] for ADHF, despite escalation of home diuretics. Now transferred to [facility] for refractory volume overload. His labs notable for stable Scr (though unclear baseline). Remains DNR/DNI per wife - wants the ICD on. Bumex escalated to continuous infusion but remains volume expanded.     Cardiac Studies  TTE 2/7/25 LVIDd 5.3cm, LVEF 30%, no LV thrombus, TAPSE 1.0cm, severely enlarged RV size/RVSF reduced, mod dilated LA, severe dilated RA, TAVR present, mod MR, severe TR, PASP 52, PASP 52, no pericardial effusion, IVC dilated 2.35cm  TTE 8/8/24; LVIDd 5.2, EF 25-30%, global hypokinesis, severe grade III DD, TASPE 1.1, TAVR present in AV position, mld-mod MR, mod TR, PASP 53

## 2025-03-13 NOTE — PROGRESS NOTE ADULT - ATTENDING COMMENTS
Mr. Solares is an 80 y/o male w/ h/o CAD s/p CABG, HFrEF/ICM (LVIDd 5.2, LVEF 25-30%) w/ DC- ICD, AFib (Xarelto), severe AS s/p TAVR, COPD (on 3-4L home O2), CKD3 (b/l 2.0), HTN, HLD, T2DM (7.9 1/25), and BPH presented to [facility] 1/10 for ADHF, despite escalation of home diuretics and subsequently  transferred to [facility] 2/5 for refractory volume overload. Since admission, has needed escalating diuretics but remains severely overloaded despite bumex gtt, hypertonic saline and diuril. Discussed with wife that potential intervention could be aggressive ultrafiltration and possible dialysis which is not in line with his goals of care and was leaning towards hospice but preferred to get him to rehab first. Discussed he may not be a candidate for rehab in his current state. Labs reviewed - Hb 7.0, BUN/Cr 105/2.63 (stable), BNP 53k. Remains DNR/DNI per wife - wants the ICD on.   - diuril as above  - c/w bumex gtt; can increase to 4 mg/hr after 4 mg bolus  - c/w 3% saline twice/day  - hydral/isordil as above  - prognosis guarded

## 2025-03-13 NOTE — PROGRESS NOTE ADULT - PROBLEM SELECTOR PLAN 1
Etiology likely ischemic. Primary cardiologist Dr. Jewel Stubbs ([facility]), last TTE LVEF 25-30%. Appears has been on some GDMT as outpt (?Farxiga, Coreg and previously on ARNI though unclear why stopped)  - Diuresis  -- c/w Bumex gtt @ 2  -- hypertonic saline 150 cc BID  -- diuril 500mg IV x1  -- BID Lytes -- goal K > 4, Mg > 2 -- STRICT I/Os -- goal neg 1-2L -- please place [place] for I/Os  -- plan for diagnostic paracentesis tomorrow given leukocytosis raising c/f SBP  - GDMT  -- Toprol XL 50mg QD  -- c/w Hydralazine to 75mg TID; Hold for SBP <90  -- c/w Isordil 20mg TID; Hold for SBP <90  -- hold Entresto and dino for DENISHA  - Has ICD in place; Currently a DNR/DNI: trial NIV. Discussed with palliative and at this time wants ICD on as per wife  - likely not a good candidate for MEMs  - recommend repeat Palliative Care evaluation given profound volume overload and recurrent admissions

## 2025-03-13 NOTE — PROGRESS NOTE ADULT - PROBLEM SELECTOR PLAN 1
EF 30% with severe tricuspid regurg and PASP 55mmHG on 2/7/24. Dry wt is 243 lbs.   - cont bumex gtt- has not been able to stay off  - diuril and HTS per HF recs  - GDMT:  Metoprolol succinate 50mg day. Holding entresto and aldactone for DENISHA. Not on SGLT2.  - Hydralazine/Nitrate: hydral 50mg TID. Isordil 20mg TID   - Cardiology following, patient declined RHC and cardiomems  - strict i/o- ro placed  - aaggressively repleat K for hypokalemia

## 2025-03-13 NOTE — PROGRESS NOTE ADULT - SUBJECTIVE AND OBJECTIVE BOX
No distress, on NC O2    Vital Signs Last 24 Hrs  T(C): 36.4 (03-13-25 @ 16:01), Max: 36.6 (03-13-25 @ 11:28)  T(F): 97.6 (03-13-25 @ 16:01), Max: 97.8 (03-13-25 @ 11:28)  HR: 70 (03-13-25 @ 16:01) (68 - 76)  BP: 119/67 (03-13-25 @ 16:01) (103/64 - 125/65)  RR: 18 (03-13-25 @ 16:01) (18 - 18)  SpO2: 98% (03-13-25 @ 16:01) (97% - 100%)    I&O's Detail    12 Mar 2025 07:01  -  13 Mar 2025 07:00  --------------------------------------------------------  IN:    Oral Fluid: 540 mL  Total IN: 540 mL    OUT:    Indwelling Catheter - Urethral (mL): 1175 mL  Total OUT: 1175 mL    13 Mar 2025 07:01  -  13 Mar 2025 16:57  --------------------------------------------------------  IN:    Oral Fluid: 240 mL  Total IN: 240 mL    OUT:    Indwelling Catheter - Urethral (mL): 450 mL  Total OUT: 450 mL    s1s2  b/l air entry  soft, ascites   edema                                                                                                         8.2    16.14 )-----------( 183      ( 12 Mar 2025 06:54 )             27.9     13 Mar 2025 07:13    137    |  89     |  105    ----------------------------<  48     3.9     |  31     |  2.63     Ca    8.9        13 Mar 2025 07:13  Phos  4.6       13 Mar 2025 07:13  Mg     2.6       13 Mar 2025 07:13    acetaminophen     Tablet .. 650 milliGRAM(s) Oral every 6 hours PRN  albuterol/ipratropium for Nebulization 3 milliLiter(s) Nebulizer every 6 hours PRN  allopurinol 100 milliGRAM(s) Oral daily  aspirin enteric coated 81 milliGRAM(s) Oral daily  atorvastatin 20 milliGRAM(s) Oral at bedtime  bisacodyl 5 milliGRAM(s) Oral every 12 hours PRN  buMETAnide Infusion 2 mG/Hr IV Continuous <Continuous>  chlorothiazide IVPB 500 milliGRAM(s) IV Intermittent once  dextrose 5%. 1000 milliLiter(s) IV Continuous <Continuous>  dextrose 5%. 1000 milliLiter(s) IV Continuous <Continuous>  dextrose 50% Injectable 25 Gram(s) IV Push once  dextrose 50% Injectable 12.5 Gram(s) IV Push once  dextrose 50% Injectable 25 Gram(s) IV Push once  dextrose Oral Gel 15 Gram(s) Oral once PRN  dextrose Oral Gel 15 Gram(s) Oral once PRN  epoetin lashawn (EPOGEN) Injectable 29138 Unit(s) SubCutaneous every 7 days  fluticasone propionate/ salmeterol 250-50 MICROgram(s) Diskus 1 Dose(s) Inhalation two times a day  folic acid 1 milliGRAM(s) Oral daily  glucagon  Injectable 1 milliGRAM(s) IntraMuscular once  hydrALAZINE 75 milliGRAM(s) Oral three times a day  insulin glargine Injectable (LANTUS) 18 Unit(s) SubCutaneous at bedtime  insulin lispro (ADMELOG) corrective regimen sliding scale   SubCutaneous three times a day before meals  insulin lispro Injectable (ADMELOG) 3 Unit(s) SubCutaneous three times a day before meals  isosorbide   dinitrate Tablet (ISORDIL) 30 milliGRAM(s) Oral three times a day  melatonin 3 milliGRAM(s) Oral at bedtime PRN  meropenem  IVPB      meropenem  IVPB 500 milliGRAM(s) IV Intermittent every 12 hours  metoprolol succinate ER 50 milliGRAM(s) Oral daily  pantoprazole    Tablet 40 milliGRAM(s) Oral every 12 hours  polyethylene glycol 3350 17 Gram(s) Oral two times a day  potassium chloride    Tablet ER 10 milliEquivalent(s) Oral daily  psyllium Powder 1 Packet(s) Oral daily  sodium chloride 0.65% Nasal 1 Spray(s) Both Nostrils two times a day  sodium chloride 3% Bolus 150 milliLiter(s) IV Bolus <User Schedule>  tamsulosin 0.4 milliGRAM(s) Oral at bedtime  tiotropium 2.5 MICROgram(s) Inhaler 2 Puff(s) Inhalation daily    A/P:    CM, EF ~ 30%, severe TR  Adm to [facility] 1/10 w/fluid overload   Tx to [facility] 2/5 for further management   Known CKD (baseline Cr ~ 2. - 2.5)  Hemodynamic/cardio-renal DENISHA/CKD   Diuresis per HF team   No objection to paracentesis   F/u BMP, Mg, UO  Avoid nephrotoxins as possible   Prognosis is guarded   No HD if/when became necessary, per d/w pt and family, will continue to discuss    [phone]

## 2025-03-13 NOTE — PROVIDER CONTACT NOTE (HYPOGLYCEMIA EVENT) - NS PROVIDER CONTACT BACKGROUND-HYPO
Age: 79y    Gender: Male    POCT Blood Glucose:  105 mg/dL (03-12-25 @ 22:24)  99 mg/dL (03-12-25 @ 22:10)  83 mg/dL (03-12-25 @ 21:56)  70 mg/dL (03-12-25 @ 21:29)  79 mg/dL (03-12-25 @ 17:26)  114 mg/dL (03-12-25 @ 12:44)  130 mg/dL (03-12-25 @ 08:44)      eMAR:allopurinol   100 milliGRAM(s) Oral (03-12-25 @ 13:46)    atorvastatin   20 milliGRAM(s) Oral (03-12-25 @ 21:25)    insulin glargine Injectable (LANTUS)   18 Unit(s) SubCutaneous (03-12-25 @ 22:31)    insulin lispro Injectable (ADMELOG)   3 Unit(s) SubCutaneous (03-12-25 @ 18:15)   3 Unit(s) SubCutaneous (03-12-25 @ 13:31)   3 Unit(s) SubCutaneous (03-12-25 @ 09:15)

## 2025-03-14 ENCOUNTER — RESULT REVIEW (OUTPATIENT)
Age: 80
End: 2025-03-14

## 2025-03-14 LAB
ALBUMIN FLD-MCNC: 2 G/DL — SIGNIFICANT CHANGE UP
ALBUMIN SERPL ELPH-MCNC: 3.3 G/DL — SIGNIFICANT CHANGE UP (ref 3.3–5)
ALP SERPL-CCNC: 155 U/L — HIGH (ref 40–120)
ALT FLD-CCNC: 10 U/L — SIGNIFICANT CHANGE UP (ref 10–45)
ANION GAP SERPL CALC-SCNC: 14 MMOL/L — SIGNIFICANT CHANGE UP (ref 5–17)
ANION GAP SERPL CALC-SCNC: 15 MMOL/L — SIGNIFICANT CHANGE UP (ref 5–17)
APTT BLD: 31.1 SEC — SIGNIFICANT CHANGE UP (ref 24.5–35.6)
AST SERPL-CCNC: 22 U/L — SIGNIFICANT CHANGE UP (ref 10–40)
B PERT IGG+IGM PNL SER: ABNORMAL
BILIRUB SERPL-MCNC: 0.5 MG/DL — SIGNIFICANT CHANGE UP (ref 0.2–1.2)
BUN SERPL-MCNC: 104 MG/DL — HIGH (ref 7–23)
BUN SERPL-MCNC: >112 MG/DL — HIGH (ref 7–23)
CALCIUM SERPL-MCNC: 7.8 MG/DL — LOW (ref 8.4–10.5)
CALCIUM SERPL-MCNC: 9.2 MG/DL — SIGNIFICANT CHANGE UP (ref 8.4–10.5)
CHLORIDE SERPL-SCNC: 88 MMOL/L — LOW (ref 96–108)
CHLORIDE SERPL-SCNC: 93 MMOL/L — LOW (ref 96–108)
CO2 SERPL-SCNC: 32 MMOL/L — HIGH (ref 22–31)
CO2 SERPL-SCNC: 32 MMOL/L — HIGH (ref 22–31)
COLOR FLD: ABNORMAL
CREAT SERPL-MCNC: 2.73 MG/DL — HIGH (ref 0.5–1.3)
CREAT SERPL-MCNC: 2.76 MG/DL — HIGH (ref 0.5–1.3)
EGFR: 22 ML/MIN/1.73M2 — LOW
EGFR: 22 ML/MIN/1.73M2 — LOW
EGFR: 23 ML/MIN/1.73M2 — LOW
EGFR: 23 ML/MIN/1.73M2 — LOW
FLUID INTAKE SUBSTANCE CLASS: SIGNIFICANT CHANGE UP
GLUCOSE BLDC GLUCOMTR-MCNC: 217 MG/DL — HIGH (ref 70–99)
GLUCOSE BLDC GLUCOMTR-MCNC: 239 MG/DL — HIGH (ref 70–99)
GLUCOSE BLDC GLUCOMTR-MCNC: 255 MG/DL — HIGH (ref 70–99)
GLUCOSE FLD-MCNC: 201 MG/DL — SIGNIFICANT CHANGE UP
GLUCOSE SERPL-MCNC: 190 MG/DL — HIGH (ref 70–99)
GLUCOSE SERPL-MCNC: 191 MG/DL — HIGH (ref 70–99)
GRAM STN FLD: SIGNIFICANT CHANGE UP
HCT VFR BLD CALC: 26.5 % — LOW (ref 39–50)
HGB BLD-MCNC: 7.5 G/DL — LOW (ref 13–17)
INR BLD: 1.77 RATIO — HIGH (ref 0.85–1.16)
LDH SERPL L TO P-CCNC: 130 U/L — SIGNIFICANT CHANGE UP
LYMPHOCYTES # FLD: 4 % — SIGNIFICANT CHANGE UP
MAGNESIUM SERPL-MCNC: 2.5 MG/DL — SIGNIFICANT CHANGE UP (ref 1.6–2.6)
MCHC RBC-ENTMCNC: 24.6 PG — LOW (ref 27–34)
MCHC RBC-ENTMCNC: 28.3 G/DL — LOW (ref 32–36)
MCV RBC AUTO: 86.9 FL — SIGNIFICANT CHANGE UP (ref 80–100)
MESOTHL CELL # FLD: SIGNIFICANT CHANGE UP
MONOS+MACROS # FLD: 50 % — SIGNIFICANT CHANGE UP
NEUTROPHILS-BODY FLUID: 46 % — SIGNIFICANT CHANGE UP
NRBC BLD AUTO-RTO: 0 /100 WBCS — SIGNIFICANT CHANGE UP (ref 0–0)
PHOSPHATE SERPL-MCNC: 5 MG/DL — HIGH (ref 2.5–4.5)
PLATELET # BLD AUTO: 179 K/UL — SIGNIFICANT CHANGE UP (ref 150–400)
POTASSIUM SERPL-MCNC: 3.2 MMOL/L — LOW (ref 3.5–5.3)
POTASSIUM SERPL-MCNC: 3.9 MMOL/L — SIGNIFICANT CHANGE UP (ref 3.5–5.3)
POTASSIUM SERPL-SCNC: 3.2 MMOL/L — LOW (ref 3.5–5.3)
POTASSIUM SERPL-SCNC: 3.9 MMOL/L — SIGNIFICANT CHANGE UP (ref 3.5–5.3)
PROT FLD-MCNC: 4 G/DL — SIGNIFICANT CHANGE UP
PROT SERPL-MCNC: 5.9 G/DL — LOW (ref 6–8.3)
PROTHROM AB SERPL-ACNC: 20.2 SEC — HIGH (ref 9.9–13.4)
RBC # BLD: 3.05 M/UL — LOW (ref 4.2–5.8)
RBC # FLD: 20.5 % — HIGH (ref 10.3–14.5)
RCV VOL RI: 7000 CELLS/UL — HIGH
SODIUM SERPL-SCNC: 135 MMOL/L — SIGNIFICANT CHANGE UP (ref 135–145)
SODIUM SERPL-SCNC: 139 MMOL/L — SIGNIFICANT CHANGE UP (ref 135–145)
SPECIMEN SOURCE: SIGNIFICANT CHANGE UP
TOTAL CELLS COUNTED, BODY FLUID: 248 CELLS — SIGNIFICANT CHANGE UP
TOTAL NUCLEATED CELL COUNT, BODY FLUID: 248 CELLS/UL — SIGNIFICANT CHANGE UP
TUBE TYPE: SIGNIFICANT CHANGE UP
WBC # BLD: 15.84 K/UL — HIGH (ref 3.8–10.5)
WBC # FLD AUTO: 15.84 K/UL — HIGH (ref 3.8–10.5)
WBC COUNT.: 235 CELLS/UL — SIGNIFICANT CHANGE UP

## 2025-03-14 PROCEDURE — 49083 ABD PARACENTESIS W/IMAGING: CPT

## 2025-03-14 PROCEDURE — 88112 CYTOPATH CELL ENHANCE TECH: CPT | Mod: 26

## 2025-03-14 PROCEDURE — 99232 SBSQ HOSP IP/OBS MODERATE 35: CPT

## 2025-03-14 PROCEDURE — 88305 TISSUE EXAM BY PATHOLOGIST: CPT | Mod: 26

## 2025-03-14 RX ORDER — ALBUMIN (HUMAN) 12.5 G/50ML
50 INJECTION, SOLUTION INTRAVENOUS
Refills: 0 | Status: DISCONTINUED | OUTPATIENT
Start: 2025-03-14 | End: 2025-03-14

## 2025-03-14 RX ORDER — BUMETANIDE 1 MG/1
4 TABLET ORAL
Qty: 20 | Refills: 0 | Status: DISCONTINUED | OUTPATIENT
Start: 2025-03-14 | End: 2025-03-14

## 2025-03-14 RX ORDER — BUMETANIDE 1 MG/1
4 TABLET ORAL ONCE
Refills: 0 | Status: DISCONTINUED | OUTPATIENT
Start: 2025-03-14 | End: 2025-03-14

## 2025-03-14 RX ORDER — ALBUMIN (HUMAN) 12.5 G/50ML
50 INJECTION, SOLUTION INTRAVENOUS
Refills: 0 | Status: COMPLETED | OUTPATIENT
Start: 2025-03-14 | End: 2025-03-15

## 2025-03-14 RX ORDER — ALBUMIN (HUMAN) 12.5 G/50ML
50 INJECTION, SOLUTION INTRAVENOUS
Refills: 0 | Status: COMPLETED | OUTPATIENT
Start: 2025-03-14 | End: 2025-03-14

## 2025-03-14 RX ADMIN — Medication 40 MILLIGRAM(S): at 17:58

## 2025-03-14 RX ADMIN — Medication 100 MILLIGRAM(S): at 11:31

## 2025-03-14 RX ADMIN — Medication 10 MILLIEQUIVALENT(S): at 11:31

## 2025-03-14 RX ADMIN — TIOTROPIUM BROMIDE INHALATION SPRAY 2 PUFF(S): 3.12 SPRAY, METERED RESPIRATORY (INHALATION) at 11:34

## 2025-03-14 RX ADMIN — POLYETHYLENE GLYCOL 3350 17 GRAM(S): 17 POWDER, FOR SOLUTION ORAL at 18:00

## 2025-03-14 RX ADMIN — INSULIN LISPRO 4: 100 INJECTION, SOLUTION INTRAVENOUS; SUBCUTANEOUS at 17:59

## 2025-03-14 RX ADMIN — Medication 81 MILLIGRAM(S): at 11:34

## 2025-03-14 RX ADMIN — INSULIN LISPRO 3 UNIT(S): 100 INJECTION, SOLUTION INTRAVENOUS; SUBCUTANEOUS at 17:59

## 2025-03-14 RX ADMIN — Medication 1 SPRAY(S): at 05:53

## 2025-03-14 RX ADMIN — ALBUMIN (HUMAN) 50 MILLILITER(S): 12.5 INJECTION, SOLUTION INTRAVENOUS at 20:54

## 2025-03-14 RX ADMIN — ISOSORBDIE DINITRATE 30 MILLIGRAM(S): 30 TABLET ORAL at 10:25

## 2025-03-14 RX ADMIN — Medication 1 PACKET(S): at 13:36

## 2025-03-14 RX ADMIN — SODIUM CHLORIDE 300 MILLILITER(S): 3 INJECTION, SOLUTION INTRAVENOUS at 05:54

## 2025-03-14 RX ADMIN — INSULIN GLARGINE-YFGN 18 UNIT(S): 100 INJECTION, SOLUTION SUBCUTANEOUS at 22:11

## 2025-03-14 RX ADMIN — Medication 75 MILLIGRAM(S): at 05:53

## 2025-03-14 RX ADMIN — BUMETANIDE 10 MG/HR: 1 TABLET ORAL at 10:24

## 2025-03-14 RX ADMIN — FOLIC ACID 1 MILLIGRAM(S): 1 TABLET ORAL at 11:33

## 2025-03-14 RX ADMIN — Medication 1 SPRAY(S): at 17:58

## 2025-03-14 RX ADMIN — Medication 50 MILLIGRAM(S): at 22:11

## 2025-03-14 RX ADMIN — Medication 40 MILLIGRAM(S): at 05:53

## 2025-03-14 RX ADMIN — Medication 1 DOSE(S): at 05:53

## 2025-03-14 RX ADMIN — Medication 1 DOSE(S): at 17:58

## 2025-03-14 RX ADMIN — ALBUMIN (HUMAN) 50 MILLILITER(S): 12.5 INJECTION, SOLUTION INTRAVENOUS at 17:55

## 2025-03-14 RX ADMIN — MEROPENEM 100 MILLIGRAM(S): 1 INJECTION INTRAVENOUS at 19:09

## 2025-03-14 RX ADMIN — ALBUMIN (HUMAN) 50 MILLILITER(S): 12.5 INJECTION, SOLUTION INTRAVENOUS at 08:30

## 2025-03-14 RX ADMIN — METOPROLOL SUCCINATE 50 MILLIGRAM(S): 50 TABLET, EXTENDED RELEASE ORAL at 10:24

## 2025-03-14 RX ADMIN — INSULIN LISPRO 6: 100 INJECTION, SOLUTION INTRAVENOUS; SUBCUTANEOUS at 12:57

## 2025-03-14 RX ADMIN — BUMETANIDE 10 MG/HR: 1 TABLET ORAL at 11:28

## 2025-03-14 RX ADMIN — ISOSORBDIE DINITRATE 30 MILLIGRAM(S): 30 TABLET ORAL at 17:57

## 2025-03-14 RX ADMIN — INSULIN LISPRO 3 UNIT(S): 100 INJECTION, SOLUTION INTRAVENOUS; SUBCUTANEOUS at 12:57

## 2025-03-14 RX ADMIN — MEROPENEM 100 MILLIGRAM(S): 1 INJECTION INTRAVENOUS at 05:54

## 2025-03-14 RX ADMIN — ATORVASTATIN CALCIUM 20 MILLIGRAM(S): 80 TABLET, FILM COATED ORAL at 22:10

## 2025-03-14 RX ADMIN — TAMSULOSIN HYDROCHLORIDE 0.4 MILLIGRAM(S): 0.4 CAPSULE ORAL at 22:11

## 2025-03-14 RX ADMIN — ISOSORBDIE DINITRATE 30 MILLIGRAM(S): 30 TABLET ORAL at 06:46

## 2025-03-14 NOTE — PRE PROCEDURE NOTE - PRE PROCEDURE EVALUATION
Interventional Radiology    HPI: 78 y/o male w/ PMHx CAD s/p CABG, HF (combined HFrEF [EF 25%] and HFpEF [G3DD]) w/ ICD, AFib on Xarelto, severe AS s/p TAVR, COPD on 3-4L home O2, CKD4, HTN, HLD, T2DM, and BPH who presents as a transfer from [facility] for HF evaluation. Ongoing CHF exacerbation now off bumex gtt, new DENISHA on CKD. Patient found to have mod ascites, Presents to IR for dx/tx paracentesis.     Allergies: No Known Allergies    Medications (Abx/Cardiac/Anticoagulation/Blood Products)  aspirin enteric coated: 81 milliGRAM(s) Oral (03-13 @ 11:53)  chlorothiazide IVPB: 100 mL/Hr IV Intermittent (03-13 @ 18:03)  hydrALAZINE: 75 milliGRAM(s) Oral (03-14 @ 05:53)  isosorbide   dinitrate Tablet (ISORDIL): 30 milliGRAM(s) Oral (03-14 @ 06:46)  meropenem  IVPB: 100 mL/Hr IV Intermittent (03-12 @ 15:17)  meropenem  IVPB: 100 mL/Hr IV Intermittent (03-14 @ 05:54)  metoprolol succinate ER: 50 milliGRAM(s) Oral (03-13 @ 10:18)  rivaroxaban: 15 milliGRAM(s) Oral (03-12 @ 18:17)    Data:  T(C): 36.6  HR: 70  BP: 120/73  RR: 18  SpO2: 98%    Exam  General: No acute distress  Chest: Non labored breathing  Abdomen: Non-distended  Extremities: No swelling, warm    -WBC 15.84 / HgB 7.5 / Hct 26.5 / Plt 179  -Na 135 / Cl 88 / BUN >112 / Glucose 191  -K 3.9 / CO2 32 / Cr 2.76  -ALT -- / Alk Phos -- / T.Bili --  -INR1.77    Imaging: reviewed    Plan: 79y Male presents for Paracentesis  -Risks/Benefits/alternatives explained with the patient and witnessed informed consent obtained.

## 2025-03-14 NOTE — PROGRESS NOTE ADULT - PROBLEM SELECTOR PLAN 1
EF 30% with severe tricuspid regurg and PASP 55mmHG on 2/7/24. Dry wt is 243 lbs.   - cont bumex gtt- now on 4mg/hr  - diuril and HTS per HF recs  - GDMT:  Metoprolol succinate 50mg day. Holding entresto and aldactone for DENISHA. Not on SGLT2.  - Hydralazine/Nitrate: hydral 50mg TID. Isordil 20mg TID   - Cardiology following, patient declined RHC and cardiomems  - per GOC with wife; pt dnr/dni but they wish to keep ICD active  - strict i/o- ro placed  - aaggressively repleat K for hypokalemia

## 2025-03-14 NOTE — PROGRESS NOTE ADULT - ASSESSMENT
80 y/o male w/ PMHx CAD s/p CABG, HF (combined HFrEF [EF 25%] and HFpEF [G3DD]) w/ ICD, AFib on Xarelto, severe AS s/p TAVR, COPD on 3-4L home O2, CKD4, HTN, HLD, T2DM, and BPH who presents as a transfer from [facility] for HF evaluation. Ongoing CHF exacerbation now off bumex gtt, new DENISHA on CKD likely 2/2 ongoing cardiorenal and possible component of abdominal compartment syndrome given tremendous ascites

## 2025-03-14 NOTE — PROGRESS NOTE ADULT - SUBJECTIVE AND OBJECTIVE BOX
Contreras Brooks MD  Division of Hospital Medicine  Available on MS teams until 7pm  If no response or off-hours, page [phone]  -------------------------------------    Patient is a 79y old  Male who presents with a chief complaint of HF eval (13 Mar 2025 16:56)      SUBJECTIVE / OVERNIGHT EVENTS: s/p dx/tx para with 4.6 L removal and albumin replacement  ADDITIONAL REVIEW OF SYSTEMS: pt feels much better, less abd discomfort, no fevers/chills, tolerating PO, breathing better. Wife wants to continue pursuing CHIARA.     MEDICATIONS  (STANDING):  allopurinol 100 milliGRAM(s) Oral daily  aspirin enteric coated 81 milliGRAM(s) Oral daily  atorvastatin 20 milliGRAM(s) Oral at bedtime  buMETAnide Infusion 2 mG/Hr (10 mL/Hr) IV Continuous <Continuous>  dextrose 5%. 1000 milliLiter(s) (50 mL/Hr) IV Continuous <Continuous>  dextrose 5%. 1000 milliLiter(s) (100 mL/Hr) IV Continuous <Continuous>  dextrose 50% Injectable 25 Gram(s) IV Push once  dextrose 50% Injectable 12.5 Gram(s) IV Push once  dextrose 50% Injectable 25 Gram(s) IV Push once  epoetin lashawn (EPOGEN) Injectable 27814 Unit(s) SubCutaneous every 7 days  fluticasone propionate/ salmeterol 250-50 MICROgram(s) Diskus 1 Dose(s) Inhalation two times a day  folic acid 1 milliGRAM(s) Oral daily  glucagon  Injectable 1 milliGRAM(s) IntraMuscular once  hydrALAZINE 75 milliGRAM(s) Oral three times a day  insulin glargine Injectable (LANTUS) 18 Unit(s) SubCutaneous at bedtime  insulin lispro (ADMELOG) corrective regimen sliding scale   SubCutaneous three times a day before meals  insulin lispro Injectable (ADMELOG) 3 Unit(s) SubCutaneous three times a day before meals  isosorbide   dinitrate Tablet (ISORDIL) 30 milliGRAM(s) Oral three times a day  meropenem  IVPB 500 milliGRAM(s) IV Intermittent every 12 hours  meropenem  IVPB      metoprolol succinate ER 50 milliGRAM(s) Oral daily  pantoprazole    Tablet 40 milliGRAM(s) Oral every 12 hours  polyethylene glycol 3350 17 Gram(s) Oral two times a day  potassium chloride    Tablet ER 10 milliEquivalent(s) Oral daily  psyllium Powder 1 Packet(s) Oral daily  sodium chloride 0.65% Nasal 1 Spray(s) Both Nostrils two times a day  sodium chloride 3% Bolus 150 milliLiter(s) IV Bolus <User Schedule>  tamsulosin 0.4 milliGRAM(s) Oral at bedtime  tiotropium 2.5 MICROgram(s) Inhaler 2 Puff(s) Inhalation daily    MEDICATIONS  (PRN):  acetaminophen     Tablet .. 650 milliGRAM(s) Oral every 6 hours PRN Temp greater or equal to 38C (100.4F), Mild Pain (1 - 3)  albuterol/ipratropium for Nebulization 3 milliLiter(s) Nebulizer every 6 hours PRN Shortness of Breath and/or Wheezing  bisacodyl 5 milliGRAM(s) Oral every 12 hours PRN Constipation  dextrose Oral Gel 15 Gram(s) Oral once PRN Blood Glucose LESS THAN 70 milliGRAM(s)/deciliter  dextrose Oral Gel 15 Gram(s) Oral once PRN Blood Glucose LESS THAN 70 milliGRAM(s)/deciliter  melatonin 3 milliGRAM(s) Oral at bedtime PRN Insomnia      CAPILLARY BLOOD GLUCOSE      POCT Blood Glucose.: 255 mg/dL (14 Mar 2025 12:34)  POCT Blood Glucose.: 225 mg/dL (13 Mar 2025 21:32)  POCT Blood Glucose.: 206 mg/dL (13 Mar 2025 17:32)    I&O's Summary    13 Mar 2025 07:01  -  14 Mar 2025 07:00  --------------------------------------------------------  IN: 600 mL / OUT: 1700 mL / NET: -1100 mL        PHYSICAL EXAM:  Vital Signs Last 24 Hrs  T(C): 36.5 (14 Mar 2025 11:53), Max: 36.7 (13 Mar 2025 23:49)  T(F): 97.7 (14 Mar 2025 11:53), Max: 98.1 (13 Mar 2025 23:49)  HR: 69 (14 Mar 2025 11:53) (65 - 70)  BP: 99/60 (14 Mar 2025 11:53) (99/60 - 123/75)  BP(mean): --  RR: 18 (14 Mar 2025 11:53) (18 - 18)  SpO2: 100% (14 Mar 2025 11:53) (98% - 100%)    Parameters below as of 14 Mar 2025 11:53  Patient On (Oxygen Delivery Method): nasal cannula  O2 Flow (L/min): 3    CONSTITUTIONAL: NAD  EYES: PERRLA; conjunctiva and sclera clear  ENMT: MMM  NECK: Supple  RESPIRATORY: dec breath sounds b/l  CARDIOVASCULAR: RRR, no JVD, +3 pitting edema/anasarca   ABDOMEN: soft/nontender  MUSCLOSKELETAL:  no clubbing/cyanosis, no joint swelling or tenderness to palpation  PSYCH: A+O x 2-3, affect normal  NEUROLOGY: CN 2-12 are intact and symmetric; no gross sensory or motor deficits  SKIN: No rashes; no palpable lesions    LABS:                        7.5    15.84 )-----------( 179      ( 14 Mar 2025 05:17 )             26.5     03-14    135  |  88[L]  |  >112[H]  ----------------------------<  191[H]  3.9   |  32[H]  |  2.76[H]    Ca    9.2      14 Mar 2025 05:16  Phos  5.0     03-14  Mg     2.5     03-14      PT/INR - ( 14 Mar 2025 05:17 )   PT: 20.2 sec;   INR: 1.77 ratio         PTT - ( 14 Mar 2025 05:17 )  PTT:31.1 sec      Urinalysis Basic - ( 14 Mar 2025 05:16 )    Color: x / Appearance: x / SG: x / pH: x  Gluc: 191 mg/dL / Ketone: x  / Bili: x / Urobili: x   Blood: x / Protein: x / Nitrite: x   Leuk Esterase: x / RBC: x / WBC x   Sq Epi: x / Non Sq Epi: x / Bacteria: x          RADIOLOGY & ADDITIONAL TESTS:  Results Reviewed:   Imaging Personally Reviewed:  Electrocardiogram Personally Reviewed:    COORDINATION OF CARE:  Care Discussed with Consultants/Other Providers [Y/N]:  Prior or Outpatient Records Reviewed [Y/N]:

## 2025-03-14 NOTE — CHART NOTE - NSCHARTNOTEFT_GEN_A_CORE
78 y/o male w/ PMHx CAD s/p CABG, HFrEF (LVIDd 5.2, LVEF 25-30%) w/ DC- ICD, AFib (Xarelto), severe AS s/p TAVR, COPD on 3-4L home O2, CKD3, HTN, HLD, T2DM, and BPH presented to [facility] for ADHF, despite escalation of home diuretics. Now transferred to [facility] for refractory volume overload. His labs notable for stable Scr (though unclear baseline). Remains DNR/DNI per wife - wants the ICD on. Bumex escalated to continuous infusion but remains volume expanded. Pro BNP 76182w up from 8000s on admission. Continue bumex gtt, hypertonic saline BID. c/w isordil 30mg PO TID, hydralazine 100mg PO TID, metoprolol. Will consider DCCV prior to discharge as sinus rhythm w/ BiV pacing may help prevent recurrent CHF exacerbations. Plan for paracentesis today.       Elis Suarez MD  PGY-5, Cardiology  Available on TEAMS    For all new consults  [email or web address]  Login: [email or web address].

## 2025-03-14 NOTE — PROGRESS NOTE ADULT - SUBJECTIVE AND OBJECTIVE BOX
No distress, on NC O2    Vital Signs Last 24 Hrs  T(C): 36.5 (03-14-25 @ 11:53), Max: 36.7 (03-13-25 @ 23:49)  T(F): 97.7 (03-14-25 @ 11:53), Max: 98.1 (03-13-25 @ 23:49)  HR: 69 (03-14-25 @ 11:53) (65 - 70)  BP: 99/60 (03-14-25 @ 11:53) (99/60 - 123/75)  RR: 18 (03-14-25 @ 11:53) (18 - 18)  SpO2: 100% (03-14-25 @ 11:53) (98% - 100%)    I&O's Detail    13 Mar 2025 07:01  -  14 Mar 2025 07:00  --------------------------------------------------------  IN:    Oral Fluid: 600 mL  Total IN: 600 mL    OUT:    Indwelling Catheter - Urethral (mL): 1700 mL  Total OUT: 1700 mL    s1s2  b/l air entry  soft, ascites   edema                                                                                                                7.5    15.84 )-----------( 179      ( 14 Mar 2025 05:17 )             26.5     14 Mar 2025 05:16    135    |  88     |  >112   ----------------------------<  191    3.9     |  32     |  2.76     Ca    9.2        14 Mar 2025 05:16  Phos  5.0       14 Mar 2025 05:16  Mg     2.5       14 Mar 2025 05:16    PT/INR - ( 14 Mar 2025 05:17 )   PT: 20.2 sec;   INR: 1.77 ratio      acetaminophen     Tablet .. 650 milliGRAM(s) Oral every 6 hours PRN  albumin human 25% IVPB 50 milliLiter(s) IV Intermittent every 3 hours  albuterol/ipratropium for Nebulization 3 milliLiter(s) Nebulizer every 6 hours PRN  allopurinol 100 milliGRAM(s) Oral daily  aspirin enteric coated 81 milliGRAM(s) Oral daily  atorvastatin 20 milliGRAM(s) Oral at bedtime  bisacodyl 5 milliGRAM(s) Oral every 12 hours PRN  dextrose 5%. 1000 milliLiter(s) IV Continuous <Continuous>  dextrose 5%. 1000 milliLiter(s) IV Continuous <Continuous>  dextrose 50% Injectable 25 Gram(s) IV Push once  dextrose 50% Injectable 12.5 Gram(s) IV Push once  dextrose 50% Injectable 25 Gram(s) IV Push once  dextrose Oral Gel 15 Gram(s) Oral once PRN  dextrose Oral Gel 15 Gram(s) Oral once PRN  epoetin lashawn (EPOGEN) Injectable 61261 Unit(s) SubCutaneous every 7 days  fluticasone propionate/ salmeterol 250-50 MICROgram(s) Diskus 1 Dose(s) Inhalation two times a day  folic acid 1 milliGRAM(s) Oral daily  glucagon  Injectable 1 milliGRAM(s) IntraMuscular once  hydrALAZINE 75 milliGRAM(s) Oral three times a day  insulin glargine Injectable (LANTUS) 18 Unit(s) SubCutaneous at bedtime  insulin lispro (ADMELOG) corrective regimen sliding scale   SubCutaneous three times a day before meals  insulin lispro Injectable (ADMELOG) 3 Unit(s) SubCutaneous three times a day before meals  isosorbide   dinitrate Tablet (ISORDIL) 30 milliGRAM(s) Oral three times a day  melatonin 3 milliGRAM(s) Oral at bedtime PRN  meropenem  IVPB 500 milliGRAM(s) IV Intermittent every 12 hours  meropenem  IVPB      metoprolol succinate ER 50 milliGRAM(s) Oral daily  pantoprazole    Tablet 40 milliGRAM(s) Oral every 12 hours  polyethylene glycol 3350 17 Gram(s) Oral two times a day  potassium chloride    Tablet ER 10 milliEquivalent(s) Oral daily  psyllium Powder 1 Packet(s) Oral daily  sodium chloride 0.65% Nasal 1 Spray(s) Both Nostrils two times a day  sodium chloride 3% Bolus 150 milliLiter(s) IV Bolus <User Schedule>  tamsulosin 0.4 milliGRAM(s) Oral at bedtime  tiotropium 2.5 MICROgram(s) Inhaler 2 Puff(s) Inhalation daily    A/P:    CM, EF ~ 30%, severe TR  Adm to [facility] 1/10 w/fluid overload   Tx to [facility] 2/5 for further management   Known CKD (baseline Cr ~ 2. - 2.5)  Hemodynamic/cardio-renal DENISHA/CKD 3/4  S/p paracentesis today w/4.5L fluid removal  D/w pt and wife again today at length, pt wish is never to be on dialysis  They are aware that it may not be possible to achieve euvolemia w/o RRT   Pt and wife are hoping for pt to go to rehab w/conservative management and hoping to avoid rapid decline in renal fx   Pls hold diuretics x 24hrs s/p LVP  Can restart if Cr is stable in am  Albumin as ordered  Recommend palliative f/u    [phone]

## 2025-03-15 DIAGNOSIS — D62 ACUTE POSTHEMORRHAGIC ANEMIA: ICD-10-CM

## 2025-03-15 LAB
-  GENTAMICIN: SIGNIFICANT CHANGE UP
-  NITROFURANTOIN: SIGNIFICANT CHANGE UP
-  OXACILLIN: SIGNIFICANT CHANGE UP
-  RIFAMPIN: SIGNIFICANT CHANGE UP
-  TETRACYCLINE: SIGNIFICANT CHANGE UP
-  TRIMETHOPRIM/SULFAMETHOXAZOLE: SIGNIFICANT CHANGE UP
-  VANCOMYCIN: SIGNIFICANT CHANGE UP
ALBUMIN SERPL ELPH-MCNC: 2.6 G/DL — LOW (ref 3.3–5)
ALBUMIN SERPL ELPH-MCNC: 3.2 G/DL — LOW (ref 3.3–5)
ALP SERPL-CCNC: 124 U/L — HIGH (ref 40–120)
ALP SERPL-CCNC: 152 U/L — HIGH (ref 40–120)
ALT FLD-CCNC: 10 U/L — SIGNIFICANT CHANGE UP (ref 10–45)
ALT FLD-CCNC: 9 U/L — LOW (ref 10–45)
AMMONIA BLD-MCNC: 60 UMOL/L — HIGH (ref 11–55)
ANION GAP SERPL CALC-SCNC: 12 MMOL/L — SIGNIFICANT CHANGE UP (ref 5–17)
ANION GAP SERPL CALC-SCNC: 12 MMOL/L — SIGNIFICANT CHANGE UP (ref 5–17)
ANISOCYTOSIS BLD QL: SLIGHT — SIGNIFICANT CHANGE UP
AST SERPL-CCNC: 19 U/L — SIGNIFICANT CHANGE UP (ref 10–40)
AST SERPL-CCNC: 23 U/L — SIGNIFICANT CHANGE UP (ref 10–40)
BASOPHILS # BLD AUTO: 0 K/UL — SIGNIFICANT CHANGE UP (ref 0–0.2)
BASOPHILS NFR BLD AUTO: 0 % — SIGNIFICANT CHANGE UP (ref 0–2)
BILIRUB SERPL-MCNC: 0.6 MG/DL — SIGNIFICANT CHANGE UP (ref 0.2–1.2)
BILIRUB SERPL-MCNC: 0.6 MG/DL — SIGNIFICANT CHANGE UP (ref 0.2–1.2)
BLD GP AB SCN SERPL QL: NEGATIVE — SIGNIFICANT CHANGE UP
BUN SERPL-MCNC: 110 MG/DL — HIGH (ref 7–23)
BUN SERPL-MCNC: 110 MG/DL — HIGH (ref 7–23)
CALCIUM SERPL-MCNC: 7.7 MG/DL — LOW (ref 8.4–10.5)
CALCIUM SERPL-MCNC: 8.5 MG/DL — SIGNIFICANT CHANGE UP (ref 8.4–10.5)
CHLORIDE SERPL-SCNC: 89 MMOL/L — LOW (ref 96–108)
CHLORIDE SERPL-SCNC: 89 MMOL/L — LOW (ref 96–108)
CO2 SERPL-SCNC: 31 MMOL/L — SIGNIFICANT CHANGE UP (ref 22–31)
CO2 SERPL-SCNC: 35 MMOL/L — HIGH (ref 22–31)
CREAT SERPL-MCNC: 3.24 MG/DL — HIGH (ref 0.5–1.3)
CREAT SERPL-MCNC: 3.32 MG/DL — HIGH (ref 0.5–1.3)
CULTURE RESULTS: ABNORMAL
EGFR: 18 ML/MIN/1.73M2 — LOW
EOSINOPHIL # BLD AUTO: 0.24 K/UL — SIGNIFICANT CHANGE UP (ref 0–0.5)
EOSINOPHIL NFR BLD AUTO: 1.8 % — SIGNIFICANT CHANGE UP (ref 0–6)
GLUCOSE BLDC GLUCOMTR-MCNC: 163 MG/DL — HIGH (ref 70–99)
GLUCOSE BLDC GLUCOMTR-MCNC: 214 MG/DL — HIGH (ref 70–99)
GLUCOSE BLDC GLUCOMTR-MCNC: 248 MG/DL — HIGH (ref 70–99)
GLUCOSE BLDC GLUCOMTR-MCNC: 265 MG/DL — HIGH (ref 70–99)
GLUCOSE SERPL-MCNC: 145 MG/DL — HIGH (ref 70–99)
GLUCOSE SERPL-MCNC: 202 MG/DL — HIGH (ref 70–99)
HCT VFR BLD CALC: 18.6 % — CRITICAL LOW (ref 39–50)
HCT VFR BLD CALC: 18.8 % — CRITICAL LOW (ref 39–50)
HCT VFR BLD CALC: 19.7 % — CRITICAL LOW (ref 39–50)
HCT VFR BLD CALC: 22.7 % — LOW (ref 39–50)
HGB BLD-MCNC: 5.3 G/DL — CRITICAL LOW (ref 13–17)
HGB BLD-MCNC: 5.5 G/DL — CRITICAL LOW (ref 13–17)
HGB BLD-MCNC: 6 G/DL — CRITICAL LOW (ref 13–17)
HGB BLD-MCNC: 7.1 G/DL — LOW (ref 13–17)
IRON SATN MFR SERPL: 16 UG/DL — LOW (ref 45–165)
IRON SATN MFR SERPL: 6 % — LOW (ref 16–55)
LACTATE SERPL-SCNC: 1.6 MMOL/L — SIGNIFICANT CHANGE UP (ref 0.5–2)
LYMPHOCYTES # BLD AUTO: 0.76 K/UL — LOW (ref 1–3.3)
LYMPHOCYTES # BLD AUTO: 5.6 % — LOW (ref 13–44)
MACROCYTES BLD QL: SLIGHT — SIGNIFICANT CHANGE UP
MANUAL SMEAR VERIFICATION: SIGNIFICANT CHANGE UP
MCHC RBC-ENTMCNC: 25 PG — LOW (ref 27–34)
MCHC RBC-ENTMCNC: 25.3 PG — LOW (ref 27–34)
MCHC RBC-ENTMCNC: 26.3 PG — LOW (ref 27–34)
MCHC RBC-ENTMCNC: 26.8 PG — LOW (ref 27–34)
MCHC RBC-ENTMCNC: 28.5 G/DL — LOW (ref 32–36)
MCHC RBC-ENTMCNC: 29.3 G/DL — LOW (ref 32–36)
MCHC RBC-ENTMCNC: 30.5 G/DL — LOW (ref 32–36)
MCHC RBC-ENTMCNC: 31.3 G/DL — LOW (ref 32–36)
MCV RBC AUTO: 85.7 FL — SIGNIFICANT CHANGE UP (ref 80–100)
MCV RBC AUTO: 86.4 FL — SIGNIFICANT CHANGE UP (ref 80–100)
MCV RBC AUTO: 86.6 FL — SIGNIFICANT CHANGE UP (ref 80–100)
MCV RBC AUTO: 87.7 FL — SIGNIFICANT CHANGE UP (ref 80–100)
METHOD TYPE: SIGNIFICANT CHANGE UP
MONOCYTES # BLD AUTO: 0.63 K/UL — SIGNIFICANT CHANGE UP (ref 0–0.9)
MONOCYTES NFR BLD AUTO: 4.6 % — SIGNIFICANT CHANGE UP (ref 2–14)
NEUTROPHILS # BLD AUTO: 11.98 K/UL — HIGH (ref 1.8–7.4)
NEUTROPHILS NFR BLD AUTO: 88 % — HIGH (ref 43–77)
NRBC BLD AUTO-RTO: 0 /100 WBCS — SIGNIFICANT CHANGE UP (ref 0–0)
ORGANISM # SPEC MICROSCOPIC CNT: ABNORMAL
ORGANISM # SPEC MICROSCOPIC CNT: ABNORMAL
PLAT MORPH BLD: NORMAL — SIGNIFICANT CHANGE UP
PLATELET # BLD AUTO: 143 K/UL — LOW (ref 150–400)
PLATELET # BLD AUTO: 146 K/UL — LOW (ref 150–400)
PLATELET # BLD AUTO: 155 K/UL — SIGNIFICANT CHANGE UP (ref 150–400)
PLATELET # BLD AUTO: 158 K/UL — SIGNIFICANT CHANGE UP (ref 150–400)
POLYCHROMASIA BLD QL SMEAR: SLIGHT — SIGNIFICANT CHANGE UP
POTASSIUM SERPL-MCNC: 3.6 MMOL/L — SIGNIFICANT CHANGE UP (ref 3.5–5.3)
POTASSIUM SERPL-MCNC: 4.4 MMOL/L — SIGNIFICANT CHANGE UP (ref 3.5–5.3)
POTASSIUM SERPL-SCNC: 3.6 MMOL/L — SIGNIFICANT CHANGE UP (ref 3.5–5.3)
POTASSIUM SERPL-SCNC: 4.4 MMOL/L — SIGNIFICANT CHANGE UP (ref 3.5–5.3)
PROT SERPL-MCNC: 4.7 G/DL — LOW (ref 6–8.3)
PROT SERPL-MCNC: 5.9 G/DL — LOW (ref 6–8.3)
RBC # BLD: 2.12 M/UL — LOW (ref 4.2–5.8)
RBC # BLD: 2.17 M/UL — LOW (ref 4.2–5.8)
RBC # BLD: 2.28 M/UL — LOW (ref 4.2–5.8)
RBC # BLD: 2.65 M/UL — LOW (ref 4.2–5.8)
RBC # FLD: 18.1 % — HIGH (ref 10.3–14.5)
RBC # FLD: 18.6 % — HIGH (ref 10.3–14.5)
RBC # FLD: 19.7 % — HIGH (ref 10.3–14.5)
RBC # FLD: 19.9 % — HIGH (ref 10.3–14.5)
RBC BLD AUTO: SIGNIFICANT CHANGE UP
RH IG SCN BLD-IMP: POSITIVE — SIGNIFICANT CHANGE UP
SODIUM SERPL-SCNC: 132 MMOL/L — LOW (ref 135–145)
SODIUM SERPL-SCNC: 136 MMOL/L — SIGNIFICANT CHANGE UP (ref 135–145)
SPECIMEN SOURCE: SIGNIFICANT CHANGE UP
TIBC SERPL-MCNC: 243 UG/DL — SIGNIFICANT CHANGE UP (ref 220–430)
UIBC SERPL-MCNC: 227 UG/DL — SIGNIFICANT CHANGE UP (ref 110–370)
URATE SERPL-MCNC: 13.1 MG/DL — HIGH (ref 3.4–8.8)
WBC # BLD: 13.61 K/UL — HIGH (ref 3.8–10.5)
WBC # BLD: 13.95 K/UL — HIGH (ref 3.8–10.5)
WBC # BLD: 14.24 K/UL — HIGH (ref 3.8–10.5)
WBC # BLD: 16.08 K/UL — HIGH (ref 3.8–10.5)
WBC # FLD AUTO: 13.61 K/UL — HIGH (ref 3.8–10.5)
WBC # FLD AUTO: 13.95 K/UL — HIGH (ref 3.8–10.5)
WBC # FLD AUTO: 14.24 K/UL — HIGH (ref 3.8–10.5)
WBC # FLD AUTO: 16.08 K/UL — HIGH (ref 3.8–10.5)

## 2025-03-15 PROCEDURE — 74176 CT ABD & PELVIS W/O CONTRAST: CPT | Mod: 26

## 2025-03-15 PROCEDURE — 99233 SBSQ HOSP IP/OBS HIGH 50: CPT

## 2025-03-15 RX ORDER — DESMOPRESSIN ACETATE 4 UG/ML
6.5 INJECTION INTRAVENOUS ONCE
Refills: 0 | Status: DISCONTINUED | OUTPATIENT
Start: 2025-03-15 | End: 2025-03-15

## 2025-03-15 RX ORDER — DESMOPRESSIN ACETATE 4 UG/ML
40 INJECTION INTRAVENOUS ONCE
Refills: 0 | Status: DISCONTINUED | OUTPATIENT
Start: 2025-03-15 | End: 2025-03-15

## 2025-03-15 RX ORDER — DESMOPRESSIN ACETATE 4 UG/ML
30 INJECTION INTRAVENOUS ONCE
Refills: 0 | Status: DISCONTINUED | OUTPATIENT
Start: 2025-03-15 | End: 2025-03-15

## 2025-03-15 RX ORDER — ISOSORBDIE DINITRATE 30 MG/1
10 TABLET ORAL THREE TIMES A DAY
Refills: 0 | Status: DISCONTINUED | OUTPATIENT
Start: 2025-03-15 | End: 2025-03-28

## 2025-03-15 RX ORDER — DESMOPRESSIN ACETATE 4 UG/ML
30 INJECTION INTRAVENOUS ONCE
Refills: 0 | Status: COMPLETED | OUTPATIENT
Start: 2025-03-15 | End: 2025-03-15

## 2025-03-15 RX ADMIN — Medication 1 PACKET(S): at 12:54

## 2025-03-15 RX ADMIN — ALBUMIN (HUMAN) 50 MILLILITER(S): 12.5 INJECTION, SOLUTION INTRAVENOUS at 00:01

## 2025-03-15 RX ADMIN — INSULIN LISPRO 2: 100 INJECTION, SOLUTION INTRAVENOUS; SUBCUTANEOUS at 08:56

## 2025-03-15 RX ADMIN — INSULIN LISPRO 3 UNIT(S): 100 INJECTION, SOLUTION INTRAVENOUS; SUBCUTANEOUS at 17:55

## 2025-03-15 RX ADMIN — METOPROLOL SUCCINATE 50 MILLIGRAM(S): 50 TABLET, EXTENDED RELEASE ORAL at 08:59

## 2025-03-15 RX ADMIN — INSULIN GLARGINE-YFGN 18 UNIT(S): 100 INJECTION, SOLUTION SUBCUTANEOUS at 21:39

## 2025-03-15 RX ADMIN — INSULIN LISPRO 4: 100 INJECTION, SOLUTION INTRAVENOUS; SUBCUTANEOUS at 12:53

## 2025-03-15 RX ADMIN — INSULIN LISPRO 3 UNIT(S): 100 INJECTION, SOLUTION INTRAVENOUS; SUBCUTANEOUS at 12:53

## 2025-03-15 RX ADMIN — Medication 1 DOSE(S): at 17:22

## 2025-03-15 RX ADMIN — MEROPENEM 100 MILLIGRAM(S): 1 INJECTION INTRAVENOUS at 18:50

## 2025-03-15 RX ADMIN — Medication 1 SPRAY(S): at 17:22

## 2025-03-15 RX ADMIN — Medication 50 MILLIGRAM(S): at 05:47

## 2025-03-15 RX ADMIN — ATORVASTATIN CALCIUM 20 MILLIGRAM(S): 80 TABLET, FILM COATED ORAL at 21:39

## 2025-03-15 RX ADMIN — Medication 1 DOSE(S): at 05:47

## 2025-03-15 RX ADMIN — Medication 1 SPRAY(S): at 05:47

## 2025-03-15 RX ADMIN — ISOSORBDIE DINITRATE 30 MILLIGRAM(S): 30 TABLET ORAL at 05:47

## 2025-03-15 RX ADMIN — MEROPENEM 100 MILLIGRAM(S): 1 INJECTION INTRAVENOUS at 05:47

## 2025-03-15 RX ADMIN — Medication 40 MILLIGRAM(S): at 17:21

## 2025-03-15 RX ADMIN — Medication 100 MILLIGRAM(S): at 12:52

## 2025-03-15 RX ADMIN — INSULIN LISPRO 3 UNIT(S): 100 INJECTION, SOLUTION INTRAVENOUS; SUBCUTANEOUS at 08:57

## 2025-03-15 RX ADMIN — TIOTROPIUM BROMIDE INHALATION SPRAY 2 PUFF(S): 3.12 SPRAY, METERED RESPIRATORY (INHALATION) at 12:54

## 2025-03-15 RX ADMIN — DESMOPRESSIN ACETATE 230 MICROGRAM(S): 4 INJECTION INTRAVENOUS at 15:58

## 2025-03-15 RX ADMIN — Medication 40 MILLIGRAM(S): at 05:47

## 2025-03-15 RX ADMIN — INSULIN LISPRO 6: 100 INJECTION, SOLUTION INTRAVENOUS; SUBCUTANEOUS at 17:54

## 2025-03-15 RX ADMIN — FOLIC ACID 1 MILLIGRAM(S): 1 TABLET ORAL at 12:52

## 2025-03-15 RX ADMIN — POLYETHYLENE GLYCOL 3350 17 GRAM(S): 17 POWDER, FOR SOLUTION ORAL at 17:21

## 2025-03-15 RX ADMIN — Medication 10 MILLIEQUIVALENT(S): at 12:53

## 2025-03-15 NOTE — CONSULT NOTE ADULT - ASSESSMENT
Interventional Radiology    Evaluate for Procedure: evaluation for possible angiography/embolization    HPI: 79y Male with extensive cardiac history, heart failure, acute on chronic kidney disease with ascites s/p paracentesis yesterday with acute drop in Hgb found to have small layering blood products in right paracolic gutter on CT imaging. IR consulted for evaluation.    Allergies: No Known Allergies    Medications (Abx/Cardiac/Anticoagulation/Blood Products)    aspirin enteric coated: 81 milliGRAM(s) Oral (03-14 @ 11:34)  buMETAnide Infusion: 10 mL/Hr IV Continuous (03-14 @ 10:25)  chlorothiazide IVPB: 100 mL/Hr IV Intermittent (03-13 @ 18:03)  hydrALAZINE: 75 milliGRAM(s) Oral (03-14 @ 05:53)  hydrALAZINE: 50 milliGRAM(s) Oral (03-15 @ 05:47)  isosorbide   dinitrate Tablet (ISORDIL): 30 milliGRAM(s) Oral (03-15 @ 05:47)  meropenem  IVPB: 100 mL/Hr IV Intermittent (03-15 @ 05:47)  metoprolol succinate ER: 50 milliGRAM(s) Oral (03-15 @ 08:59)    Data:    T(C): 37.4  HR: 64  BP: 92/55  RR: 18  SpO2: 98%    -WBC 13.95 / HgB 5.3 / Hct 18.6 / Plt 158  -Na 136 / Cl 89 /  / Glucose 145  -K 3.6 / CO2 35 / Cr 3.24  -ALT 9 / Alk Phos 152 / T.Bili 0.6  -INR 1.77 / PTT 31.1      Radiology: CT reviewed    Assessment/Plan:   79y Male with extensive cardiac history, heart failure, acute on chronic kidney disease with ascites s/p paracentesis yesterday with acute drop in Hgb found to have small layering blood products in right paracolic gutter on CT imaging. IR consulted for evaluation.  -- imaging reviewed & discussed plan with team  -- plan to transfuse 2U PRBC  -- patient hemodynamically stable at this time but if clinically decompensates / becomes hypotensive call IR for angiogram/embolization  d/w Dr. Sherman    --  Noam Quintero DO/BETO/MARYANNE, PGY-6 Chief Resident  Vascular and Interventional Radiology   Available on Microsoft Teams    - Non-emergent consults: Place IR consult order in [place]  - Emergent issues (pager): [facility] [phone]; [facility] [phone]; [phone]  - Scheduling questions: [facility] [phone]; [facility] [phone]  - Clinic/outpatient booking: [facility] [phone]; [facility] [phone] Interventional Radiology    Evaluate for Procedure: evaluation for possible angiography/embolization    HPI: 79y Male with extensive cardiac history, heart failure, acute on chronic kidney disease with ascites s/p paracentesis yesterday with acute drop in Hgb found to have small layering blood products in right paracolic gutter on CT imaging. IR consulted for evaluation.    Allergies: No Known Allergies    Medications (Abx/Cardiac/Anticoagulation/Blood Products)    aspirin enteric coated: 81 milliGRAM(s) Oral (03-14 @ 11:34)  buMETAnide Infusion: 10 mL/Hr IV Continuous (03-14 @ 10:25)  chlorothiazide IVPB: 100 mL/Hr IV Intermittent (03-13 @ 18:03)  hydrALAZINE: 75 milliGRAM(s) Oral (03-14 @ 05:53)  hydrALAZINE: 50 milliGRAM(s) Oral (03-15 @ 05:47)  isosorbide   dinitrate Tablet (ISORDIL): 30 milliGRAM(s) Oral (03-15 @ 05:47)  meropenem  IVPB: 100 mL/Hr IV Intermittent (03-15 @ 05:47)  metoprolol succinate ER: 50 milliGRAM(s) Oral (03-15 @ 08:59)    Data:    T(C): 37.4  HR: 64  BP: 92/55  RR: 18  SpO2: 98%    -WBC 13.95 / HgB 5.3 / Hct 18.6 / Plt 158  -Na 136 / Cl 89 /  / Glucose 145  -K 3.6 / CO2 35 / Cr 3.24  -ALT 9 / Alk Phos 152 / T.Bili 0.6  -INR 1.77 / PTT 31.1      Radiology: CT reviewed    Assessment/Plan:   79y Male with extensive cardiac history, heart failure, acute on chronic kidney disease with ascites s/p paracentesis yesterday with acute drop in Hgb found to have small layering blood products in right paracolic gutter on CT imaging. IR consulted for evaluation.  -- imaging reviewed & discussed plan with team  -- plan to transfuse 2U PRBC  -- patient hemodynamically stable at this time; spoke with primary team, patient & wife wants conservative measures at this time    -- can give DDAVP  -- if clinically decompensates / becomes hypotensive can call IR   d/w Dr. Sherman    --  Noam Quintero DO/BETO/MARYANNE, PGY-6 Chief Resident  Vascular and Interventional Radiology   Available on [employer] Teams    - Non-emergent consults: Place IR consult order in [place]  - Emergent issues (pager): [facility] [phone]; [facility] [phone]; [phone]  - Scheduling questions: [facility] [phone]; [facility] [phone]  - Clinic/outpatient booking: [place] [address] [phone]; [facility] [phone] Interventional Radiology    Evaluate for Procedure: evaluation for possible angiography/embolization    HPI: 79y Male with extensive cardiac history, heart failure, acute on chronic kidney disease with ascites s/p paracentesis yesterday with acute drop in Hgb found to have small layering blood products in right paracolic gutter on CT imaging. IR consulted for evaluation.    Allergies: No Known Allergies    Medications (Abx/Cardiac/Anticoagulation/Blood Products)    aspirin enteric coated: 81 milliGRAM(s) Oral (03-14 @ 11:34)  buMETAnide Infusion: 10 mL/Hr IV Continuous (03-14 @ 10:25)  chlorothiazide IVPB: 100 mL/Hr IV Intermittent (03-13 @ 18:03)  hydrALAZINE: 75 milliGRAM(s) Oral (03-14 @ 05:53)  hydrALAZINE: 50 milliGRAM(s) Oral (03-15 @ 05:47)  isosorbide   dinitrate Tablet (ISORDIL): 30 milliGRAM(s) Oral (03-15 @ 05:47)  meropenem  IVPB: 100 mL/Hr IV Intermittent (03-15 @ 05:47)  metoprolol succinate ER: 50 milliGRAM(s) Oral (03-15 @ 08:59)    Data:    T(C): 37.4  HR: 64  BP: 92/55  RR: 18  SpO2: 98%    -WBC 13.95 / HgB 5.3 / Hct 18.6 / Plt 158  -Na 136 / Cl 89 /  / Glucose 145  -K 3.6 / CO2 35 / Cr 3.24  -ALT 9 / Alk Phos 152 / T.Bili 0.6  -INR 1.77 / PTT 31.1      Radiology: CT reviewed    Assessment/Plan:   79y Male with extensive cardiac history, heart failure, acute on chronic kidney disease with ascites s/p paracentesis yesterday with acute drop in Hgb found to have small layering blood products in right paracolic gutter on CT imaging. IR consulted for evaluation.  -- imaging reviewed & discussed plan with team  -- plan to transfuse 2U PRBC.  -- patient hemodynamically stable at this time; spoke with primary team, patient & wife wants conservative measures at this time.    -- can give DDAVP.  -- if clinically decompensates / becomes hypotensive can call IR.   d/w Dr. Sherman    --  Noam Quintero DO/BETO/MARYANNE, PGY-6 Chief Resident  Vascular and Interventional Radiology   Available on [employer] Teams    - Non-emergent consults: Place IR consult order in [place]  - Emergent issues (pager): [facility] [phone]; [facility] [phone]; [phone]  - Scheduling questions: [facility] [phone]; [facility] [phone]  - Clinic/outpatient booking: [facility] [phone]; [facility] [phone]

## 2025-03-15 NOTE — ADVANCED PRACTICE NURSE CONSULT - REASON FOR CONSULT
Midline Catheter Insertion Note    Catheter type: 4F  : Bard  Power Injectable: Yes  Ref#: [id number]  Procedure assisted by: Milan Dawkins RN    Time out was preformed, confirming the patient's first and last name, date of birth, procedure, and correct site prior to state of procedure.  Patient was placed with HOB 30 degrees. Patient placement site was prepped with chlorhexidine solution, then draped using maximum sterile barrier protection. Using the Bard Site Rite 8, the catheter was placed using the Modified Seldinger Technique. The area was injected with 2 ml of 1% lidocaine. Using the ultrasound, the catheter was introduced. Strict adherence to outline aseptic technique including handwashing, glove and gown, utilizing mask and cap, plus draping the patient with a sterile drape was observed. Upon completion of line placement, the insertion site was covered with a sterile occlusive CHG dressing. Pt tolerated procedure well.   All materials used for catheter insertion, including the intact guide wires, were accounted for at the end of the procedure.    Number of attempts: 1  Complications/Comments: none  Emergency Placement: no     Site: new site   Anatomical Site of insertion: R Cephalic  Catheter size/length: 4 Fr., 20 cm.   US guided Bard SL Power MIDLINE placed

## 2025-03-15 NOTE — CHART NOTE - NSCHARTNOTEFT_GEN_A_CORE
s/p CTAP which confirmed hemoperitoneum.   < from: CT Abdomen and Pelvis No Cont (03.15.25 @ 09:13) >    Moderate volume ascites. Layering hyperdensity within the ascites in the   right paracolic gutter, suggestive of hemoperitoneum.  No retroperitoneal hemorrhage.    discussed the finding with the wife, now agreeable to blood transfusion  will consult IR for possible intervention    discussed with NP Laurita Ingram MD  Division of Hospital Medicine  Contact via [employer] Teams  Office: [phone] s/p CTAP which confirmed hemoperitoneum.   < from: CT Abdomen and Pelvis No Cont (03.15.25 @ 09:13) >    Moderate volume ascites. Layering hyperdensity within the ascites in the   right paracolic gutter, suggestive of hemoperitoneum.  No retroperitoneal hemorrhage.    discussed the finding with the wife, now agreeable to blood transfusion  Discussed with Dr. Sherman (IR) - If patient decompensates, then can proceed with angiogram to attempt to stop bleeding  Will give 2 units of pRBC as well as DDAVP  At this time, wife wants conservative management without invasive interventions    discussed with NP Laurita Ingram MD  Division of Hospital Medicine  Contact via [employer] Teams  Office: [phone] s/p CTAP which confirmed hemoperitoneum.   < from: CT Abdomen and Pelvis No Cont (03.15.25 @ 09:13) >    Moderate volume ascites. Layering hyperdensity within the ascites in the   right paracolic gutter, suggestive of hemoperitoneum.  No retroperitoneal hemorrhage.    discussed the finding with the wife, now agreeable to blood transfusion  Discussed with Dr. Sherman (IR) - will continue to monitor his status. If hemodynamically unstable/decompensates, will further discuss treatment options  Will give 2 units of pRBC as well as DDAVP  At this time, wife wants conservative management without invasive interventions    discussed with NP Laurita Ingram MD  Division of Hospital Medicine  Contact via [employer] Teams  Office: [phone].

## 2025-03-15 NOTE — PROGRESS NOTE ADULT - SUBJECTIVE AND OBJECTIVE BOX
Reba Ingrma MD  Division of Hospital Medicine  Please contact via MS Teams (prefer message first)  Office: [phone]    Patient is a 79y old  Male who presents with a chief complaint of HF eval (14 Mar 2025 13:54)      SUBJECTIVE / OVERNIGHT EVENTS:  s/p para yesterday, with sudden drop in Hgb from 7.5 to 5.5  pt with tense abdomen but still mentating and vitals relatively stable  discussed blood transfusion but declines as he wants his wife to be at bedside  call placed to wife but she doesn't feel comfortable giving consent until she gets to the bedside  attempted to discuss the urgency of the matter but still declines  pending CTAP to rule out RP bleed    ROS:  14 point ROS negative in detail except stated as above    MEDICATIONS  (STANDING):  allopurinol 100 milliGRAM(s) Oral daily  aspirin enteric coated 81 milliGRAM(s) Oral daily  atorvastatin 20 milliGRAM(s) Oral at bedtime  dextrose 5%. 1000 milliLiter(s) (50 mL/Hr) IV Continuous <Continuous>  dextrose 5%. 1000 milliLiter(s) (100 mL/Hr) IV Continuous <Continuous>  dextrose 50% Injectable 25 Gram(s) IV Push once  dextrose 50% Injectable 12.5 Gram(s) IV Push once  dextrose 50% Injectable 25 Gram(s) IV Push once  epoetin lashawn (EPOGEN) Injectable 75600 Unit(s) SubCutaneous every 7 days  fluticasone propionate/ salmeterol 250-50 MICROgram(s) Diskus 1 Dose(s) Inhalation two times a day  folic acid 1 milliGRAM(s) Oral daily  glucagon  Injectable 1 milliGRAM(s) IntraMuscular once  hydrALAZINE 50 milliGRAM(s) Oral three times a day  insulin glargine Injectable (LANTUS) 18 Unit(s) SubCutaneous at bedtime  insulin lispro (ADMELOG) corrective regimen sliding scale   SubCutaneous three times a day before meals  insulin lispro Injectable (ADMELOG) 3 Unit(s) SubCutaneous three times a day before meals  isosorbide   dinitrate Tablet (ISORDIL) 30 milliGRAM(s) Oral three times a day  meropenem  IVPB      meropenem  IVPB 500 milliGRAM(s) IV Intermittent every 12 hours  metoprolol succinate ER 50 milliGRAM(s) Oral daily  pantoprazole    Tablet 40 milliGRAM(s) Oral every 12 hours  polyethylene glycol 3350 17 Gram(s) Oral two times a day  potassium chloride    Tablet ER 10 milliEquivalent(s) Oral daily  psyllium Powder 1 Packet(s) Oral daily  sodium chloride 0.65% Nasal 1 Spray(s) Both Nostrils two times a day  tamsulosin 0.4 milliGRAM(s) Oral at bedtime  tiotropium 2.5 MICROgram(s) Inhaler 2 Puff(s) Inhalation daily    MEDICATIONS  (PRN):  acetaminophen     Tablet .. 650 milliGRAM(s) Oral every 6 hours PRN Temp greater or equal to 38C (100.4F), Mild Pain (1 - 3)  albuterol/ipratropium for Nebulization 3 milliLiter(s) Nebulizer every 6 hours PRN Shortness of Breath and/or Wheezing  bisacodyl 5 milliGRAM(s) Oral every 12 hours PRN Constipation  dextrose Oral Gel 15 Gram(s) Oral once PRN Blood Glucose LESS THAN 70 milliGRAM(s)/deciliter  dextrose Oral Gel 15 Gram(s) Oral once PRN Blood Glucose LESS THAN 70 milliGRAM(s)/deciliter  melatonin 3 milliGRAM(s) Oral at bedtime PRN Insomnia      CAPILLARY BLOOD GLUCOSE      POCT Blood Glucose.: 163 mg/dL (15 Mar 2025 08:31)  POCT Blood Glucose.: 217 mg/dL (14 Mar 2025 21:58)  POCT Blood Glucose.: 239 mg/dL (14 Mar 2025 17:18)  POCT Blood Glucose.: 255 mg/dL (14 Mar 2025 12:34)    I&O's Summary    14 Mar 2025 07:01  -  15 Mar 2025 07:00  --------------------------------------------------------  IN: 0 mL / OUT: 700 mL / NET: -700 mL        PHYSICAL EXAM:  Vital Signs Last 24 Hrs  T(C): 37.4 (15 Mar 2025 08:27), Max: 37.4 (15 Mar 2025 08:27)  T(F): 99.3 (15 Mar 2025 08:27), Max: 99.3 (15 Mar 2025 08:27)  HR: 64 (15 Mar 2025 08:27) (64 - 77)  BP: 92/55 (15 Mar 2025 08:27) (92/55 - 118/68)  BP(mean): --  RR: 18 (15 Mar 2025 08:27) (17 - 18)  SpO2: 98% (15 Mar 2025 08:27) (98% - 100%)    Parameters below as of 15 Mar 2025 08:27  Patient On (Oxygen Delivery Method): nasal cannula  O2 Flow (L/min): 4    GENERAL: NAD, well-developed  HEAD:  Atraumatic, Normocephalic  EYES: EOMI, PERRLA, conjunctiva and sclera clear  NECK: Supple, No JVD  CHEST/LUNG: Clear to auscultation bilaterally; No wheeze  HEART: Regular rate and rhythm; No murmurs, rubs, or gallops  ABDOMEN: distended, tense  EXTREMITIES:  4+ pitting edema  NEUROLOGY: AAOx3; non-focal  SKIN: No rashes or lesions    LABS:                        5.3    13.95 )-----------( 158      ( 15 Mar 2025 07:17 )             18.6     03-15    136  |  89[L]  |  110[H]  ----------------------------<  145[H]  3.6   |  35[H]  |  3.24[H]    Ca    8.5      15 Mar 2025 05:59  Phos  5.0     03-14  Mg     2.5     03-14    TPro  5.9[L]  /  Alb  3.2[L]  /  TBili  0.6  /  DBili  x   /  AST  19  /  ALT  9[L]  /  AlkPhos  152[H]  03-15    PT/INR - ( 14 Mar 2025 05:17 )   PT: 20.2 sec;   INR: 1.77 ratio         PTT - ( 14 Mar 2025 05:17 )  PTT:31.1 sec      Urinalysis Basic - ( 15 Mar 2025 05:59 )    Color: x / Appearance: x / SG: x / pH: x  Gluc: 145 mg/dL / Ketone: x  / Bili: x / Urobili: x   Blood: x / Protein: x / Nitrite: x   Leuk Esterase: x / RBC: x / WBC x   Sq Epi: x / Non Sq Epi: x / Bacteria: x        RADIOLOGY & ADDITIONAL TESTS:    Imaging Personally Reviewed:    Consultant(s) Notes Reviewed:      Care Discussed with Consultants/Other Providers:

## 2025-03-15 NOTE — PROGRESS NOTE ADULT - SUBJECTIVE AND OBJECTIVE BOX
[facility]                                                       Dr. Talbert, Dr. Rowe, Dr. Turcios, Dr. Acosta, Dr. Cotton, Dr. Carrillo                                      [facility], [facility], [address]                                                 [address] [address]                                      Ph: [phone]  Fax: [phone]                                         Ph: [phone]  Fax: [phone]      Patient is a 79y old  Male who presents with a chief complaint of HF eval (15 Mar 2025 11:14)  Patient seen in follow up for DENISHA.        PAST MEDICAL HISTORY:  HTN (hypertension)    DM (diabetes mellitus), type 2    High cholesterol    CHF (congestive heart failure)    Pneumonia    COPD (chronic obstructive pulmonary disease)    Pacemaker    AICD (automatic cardioverter/defibrillator) present    Chronic renal insufficiency    HLD (hyperlipidemia)    Insulin dependent type 2 diabetes mellitus    Chronic hypoxic respiratory failure, on home oxygen therapy    Stage 4 chronic kidney disease    Chronic combined systolic and diastolic heart failure    Unspecified atrial fibrillation      MEDICATIONS  (STANDING):  allopurinol 100 milliGRAM(s) Oral daily  atorvastatin 20 milliGRAM(s) Oral at bedtime  dextrose 5%. 1000 milliLiter(s) (50 mL/Hr) IV Continuous <Continuous>  dextrose 5%. 1000 milliLiter(s) (100 mL/Hr) IV Continuous <Continuous>  dextrose 50% Injectable 25 Gram(s) IV Push once  dextrose 50% Injectable 12.5 Gram(s) IV Push once  dextrose 50% Injectable 25 Gram(s) IV Push once  epoetin lashawn (EPOGEN) Injectable 34098 Unit(s) SubCutaneous every 7 days  fluticasone propionate/ salmeterol 250-50 MICROgram(s) Diskus 1 Dose(s) Inhalation two times a day  folic acid 1 milliGRAM(s) Oral daily  glucagon  Injectable 1 milliGRAM(s) IntraMuscular once  hydrALAZINE 50 milliGRAM(s) Oral three times a day  insulin glargine Injectable (LANTUS) 18 Unit(s) SubCutaneous at bedtime  insulin lispro (ADMELOG) corrective regimen sliding scale   SubCutaneous three times a day before meals  insulin lispro Injectable (ADMELOG) 3 Unit(s) SubCutaneous three times a day before meals  isosorbide   dinitrate Tablet (ISORDIL) 30 milliGRAM(s) Oral three times a day  meropenem  IVPB      meropenem  IVPB 500 milliGRAM(s) IV Intermittent every 12 hours  metoprolol succinate ER 50 milliGRAM(s) Oral daily  pantoprazole    Tablet 40 milliGRAM(s) Oral every 12 hours  polyethylene glycol 3350 17 Gram(s) Oral two times a day  potassium chloride    Tablet ER 10 milliEquivalent(s) Oral daily  psyllium Powder 1 Packet(s) Oral daily  sodium chloride 0.65% Nasal 1 Spray(s) Both Nostrils two times a day  tamsulosin 0.4 milliGRAM(s) Oral at bedtime  tiotropium 2.5 MICROgram(s) Inhaler 2 Puff(s) Inhalation daily    MEDICATIONS  (PRN):  acetaminophen     Tablet .. 650 milliGRAM(s) Oral every 6 hours PRN Temp greater or equal to 38C (100.4F), Mild Pain (1 - 3)  albuterol/ipratropium for Nebulization 3 milliLiter(s) Nebulizer every 6 hours PRN Shortness of Breath and/or Wheezing  bisacodyl 5 milliGRAM(s) Oral every 12 hours PRN Constipation  dextrose Oral Gel 15 Gram(s) Oral once PRN Blood Glucose LESS THAN 70 milliGRAM(s)/deciliter  dextrose Oral Gel 15 Gram(s) Oral once PRN Blood Glucose LESS THAN 70 milliGRAM(s)/deciliter  melatonin 3 milliGRAM(s) Oral at bedtime PRN Insomnia    T(C): 36.3 (03-15-25 @ 11:18), Max: 37.4 (03-15-25 @ 08:27)  HR: 73 (03-15-25 @ 11:18) (64 - 77)  BP: 97/58 (03-15-25 @ 11:18) (92/55 - 118/68)  RR: 18 (03-15-25 @ 11:18) (17 - 18)  SpO2: 98% (03-15-25 @ 11:18) (98% - 100%)  Wt(kg): --  I&O's Detail    14 Mar 2025 07:01  -  15 Mar 2025 07:00  --------------------------------------------------------  IN:  Total IN: 0 mL    OUT:    Indwelling Catheter - Urethral (mL): 700 mL  Total OUT: 700 mL    Total NET: -700 mL      15 Mar 2025 07:01  -  15 Mar 2025 16:49  --------------------------------------------------------  IN:    Oral Fluid: 120 mL  Total IN: 120 mL    OUT:    Indwelling Catheter - Urethral (mL): 100 mL  Total OUT: 100 mL    Total NET: 20 mL          PHYSICAL EXAM:  General: No distress  Respiratory: b/l air entry  Cardiovascular: S1 S2  Gastrointestinal: soft  Extremities:  edema                              6.0    14.24 )-----------( 146      ( 15 Mar 2025 14:52 )             19.7     03-15    132[L]  |  89[L]  |  110[H]  ----------------------------<  202[H]  4.4   |  31  |  3.32[H]    Ca    7.7[L]      15 Mar 2025 14:52  Phos  5.0     03-14  Mg     2.5     03-14    TPro  4.7[L]  /  Alb  2.6[L]  /  TBili  0.6  /  DBili  x   /  AST  23  /  ALT  10  /  AlkPhos  124[H]  03-15        LIVER FUNCTIONS - ( 15 Mar 2025 14:52 )  Alb: 2.6 g/dL / Pro: 4.7 g/dL / ALK PHOS: 124 U/L / ALT: 10 U/L / AST: 23 U/L / GGT: x           Urinalysis Basic - ( 15 Mar 2025 14:52 )    Color: x / Appearance: x / SG: x / pH: x  Gluc: 202 mg/dL / Ketone: x  / Bili: x / Urobili: x   Blood: x / Protein: x / Nitrite: x   Leuk Esterase: x / RBC: x / WBC x   Sq Epi: x / Non Sq Epi: x / Bacteria: x        Sodium, Serum: 132 (03-15 @ 14:52)  Sodium, Serum: 136 (03-15 @ 05:59)  Sodium, Serum: 139 (03-14 @ 20:01)  Sodium, Serum: 135 (03-14 @ 05:16)    Creatinine, Serum: 3.32 (03-15 @ 14:52)  Creatinine, Serum: 3.24 (03-15 @ 05:59)  Creatinine, Serum: 2.73 (03-14 @ 20:01)  Creatinine, Serum: 2.76 (03-14 @ 05:16)  Creatinine, Serum: 2.63 (03-13 @ 07:13)  Creatinine, Serum: 2.54 (03-12 @ 06:52)  Creatinine, Serum: 2.67 (03-11 @ 21:23)    Potassium, Serum: 4.4 (03-15 @ 14:52)  Potassium, Serum: 3.6 (03-15 @ 05:59)  Potassium, Serum: 3.2 (03-14 @ 20:01)  Potassium, Serum: 3.9 (03-14 @ 05:16)    Hemoglobin: 6.0 (03-15 @ 14:52)  Hemoglobin: 5.3 (03-15 @ 07:17)  Hemoglobin: 5.5 (03-15 @ 06:02)  Hemoglobin: 7.5 (03-14 @ 05:17)

## 2025-03-15 NOTE — CHART NOTE - NSCHARTNOTEFT_GEN_A_CORE
Notified by RN, patient AM hemoglobin results 5.5. Patient s/p paracentesis with ~4L fluid removed 3/14. Patient seen and examined at bedside, with no acute complaints. On exam, patient abdomen distended, non tender to palpation except for site of paracentesis. Bowel sounds present. According to patient he has had a small bowel movement. At this time, patient refusing to sign blood consent without wife present. Patients wife called regarding blood consent, deferring until she is physically present at bedside. STAT CBC, type & screen and CT A/P ordered. Sign out given to AM covering provider to follow up above interventions.  Denies nausea, vomiting, chest pain, SOB.     Vital Signs Last 24 Hrs  T(C): 36.7 (15 Mar 2025 04:35), Max: 36.8 (14 Mar 2025 16:14)  T(F): 98 (15 Mar 2025 04:35), Max: 98.2 (14 Mar 2025 16:14)  HR: 70 (15 Mar 2025 04:35) (69 - 77)  BP: 118/68 (15 Mar 2025 04:35) (99/60 - 118/68)  BP(mean): --  RR: 18 (15 Mar 2025 04:35) (17 - 18)  SpO2: 98% (15 Mar 2025 04:35) (98% - 100%)    Parameters below as of 15 Mar 2025 04:35  Patient On (Oxygen Delivery Method): nasal cannula  O2 Flow (L/min): 4    Judi Fernández PA-C  Internal Medicine

## 2025-03-15 NOTE — PROGRESS NOTE ADULT - ASSESSMENT
78 y/o male w/ PMHx CAD s/p CABG, HF (combined HFrEF [EF 25%] and HFpEF [G3DD]) w/ ICD, AFib on Xarelto, severe AS s/p TAVR, COPD on 3-4L home O2, CKD4, HTN, HLD, T2DM, and BPH who presents as a transfer from [facility] for HF evaluation. Ongoing CHF exacerbation now off bumex gtt, new DENISHA on CKD likely 2/2 ongoing cardiorenal and possible component of abdominal compartment syndrome given tremendous ascites

## 2025-03-15 NOTE — PROGRESS NOTE ADULT - ASSESSMENT
CM, EF ~ 30%, severe TR  Adm to [facility] VS 1/10 w/fluid overload   Tx to [facility] 2/5 for further management   Known CKD (baseline Cr ~ 2. - 2.5)  Hemodynamic/cardio-renal DENISHA/CKD 3/4  S/p paracentesis today w/4.5L fluid removal  Worsening renal indices. PRBC transfusion. Supportive care.   Discussed with patients wife at the bedside.

## 2025-03-15 NOTE — PROGRESS NOTE ADULT - PROBLEM SELECTOR PLAN 3
- Likely DENISHA in setting of cardiorenal syndrome  - Continue monitoring Cr and urine output. Nephrology following, appreciate recs.    #Leukocytosis- rising white count ove rthe past couple days; also with abd distension and pain. Ddx includes severe constipation given suboptimal BM vs. less likely SBP from cardiac ascites vs. possible UTI  - UA positive, started renally dosed meropenem, f/u ucx  - pt with large bm x 2, cont aggressive bowel regimen  - f/u dx/tx para given large ascites on imaging  - cont trending cbc

## 2025-03-15 NOTE — PROGRESS NOTE ADULT - PROBLEM SELECTOR PLAN 1
Sudden drop in Hgb from 7.5 to 5.5 this morning s/p para yesterday  - abdomen very tender to palpate  - pending  CTAP to rule out RP bleed  - currently, pt and wife declining blood transfusion  - if RP bleed confirmed, will consult IR  - hold xarelto

## 2025-03-16 LAB
GLUCOSE BLDC GLUCOMTR-MCNC: 134 MG/DL — HIGH (ref 70–99)
GLUCOSE BLDC GLUCOMTR-MCNC: 137 MG/DL — HIGH (ref 70–99)
GLUCOSE BLDC GLUCOMTR-MCNC: 165 MG/DL — HIGH (ref 70–99)
GLUCOSE BLDC GLUCOMTR-MCNC: 200 MG/DL — HIGH (ref 70–99)
HCT VFR BLD CALC: 22 % — LOW (ref 39–50)
HCT VFR BLD CALC: 22.1 % — LOW (ref 39–50)
HCT VFR BLD CALC: 26.2 % — LOW (ref 39–50)
HGB BLD-MCNC: 6.9 G/DL — CRITICAL LOW (ref 13–17)
HGB BLD-MCNC: 7 G/DL — CRITICAL LOW (ref 13–17)
HGB BLD-MCNC: 8.3 G/DL — LOW (ref 13–17)
LACTATE SERPL-SCNC: 1.1 MMOL/L — SIGNIFICANT CHANGE UP (ref 0.5–2)
MCHC RBC-ENTMCNC: 26.7 PG — LOW (ref 27–34)
MCHC RBC-ENTMCNC: 26.7 PG — LOW (ref 27–34)
MCHC RBC-ENTMCNC: 26.8 PG — LOW (ref 27–34)
MCHC RBC-ENTMCNC: 31.4 G/DL — LOW (ref 32–36)
MCHC RBC-ENTMCNC: 31.7 G/DL — LOW (ref 32–36)
MCHC RBC-ENTMCNC: 31.7 G/DL — LOW (ref 32–36)
MCV RBC AUTO: 84.4 FL — SIGNIFICANT CHANGE UP (ref 80–100)
MCV RBC AUTO: 84.5 FL — SIGNIFICANT CHANGE UP (ref 80–100)
MCV RBC AUTO: 85.3 FL — SIGNIFICANT CHANGE UP (ref 80–100)
NRBC BLD AUTO-RTO: 0 /100 WBCS — SIGNIFICANT CHANGE UP (ref 0–0)
PLATELET # BLD AUTO: 138 K/UL — LOW (ref 150–400)
PLATELET # BLD AUTO: 148 K/UL — LOW (ref 150–400)
PLATELET # BLD AUTO: 151 K/UL — SIGNIFICANT CHANGE UP (ref 150–400)
RBC # BLD: 2.58 M/UL — LOW (ref 4.2–5.8)
RBC # BLD: 2.62 M/UL — LOW (ref 4.2–5.8)
RBC # BLD: 3.1 M/UL — LOW (ref 4.2–5.8)
RBC # FLD: 17.4 % — HIGH (ref 10.3–14.5)
RBC # FLD: 18.2 % — HIGH (ref 10.3–14.5)
RBC # FLD: 18.5 % — HIGH (ref 10.3–14.5)
WBC # BLD: 15.41 K/UL — HIGH (ref 3.8–10.5)
WBC # BLD: 16.62 K/UL — HIGH (ref 3.8–10.5)
WBC # BLD: 17.62 K/UL — HIGH (ref 3.8–10.5)
WBC # FLD AUTO: 15.41 K/UL — HIGH (ref 3.8–10.5)
WBC # FLD AUTO: 16.62 K/UL — HIGH (ref 3.8–10.5)
WBC # FLD AUTO: 17.62 K/UL — HIGH (ref 3.8–10.5)

## 2025-03-16 PROCEDURE — 99233 SBSQ HOSP IP/OBS HIGH 50: CPT

## 2025-03-16 RX ORDER — BUMETANIDE 1 MG/1
4 TABLET ORAL
Qty: 20 | Refills: 0 | Status: DISCONTINUED | OUTPATIENT
Start: 2025-03-16 | End: 2025-03-17

## 2025-03-16 RX ADMIN — Medication 25 MILLIGRAM(S): at 13:53

## 2025-03-16 RX ADMIN — Medication 1 DOSE(S): at 17:58

## 2025-03-16 RX ADMIN — BUMETANIDE 20 MG/HR: 1 TABLET ORAL at 22:10

## 2025-03-16 RX ADMIN — INSULIN LISPRO 2: 100 INJECTION, SOLUTION INTRAVENOUS; SUBCUTANEOUS at 12:45

## 2025-03-16 RX ADMIN — TAMSULOSIN HYDROCHLORIDE 0.4 MILLIGRAM(S): 0.4 CAPSULE ORAL at 22:00

## 2025-03-16 RX ADMIN — Medication 1 SPRAY(S): at 17:58

## 2025-03-16 RX ADMIN — TIOTROPIUM BROMIDE INHALATION SPRAY 2 PUFF(S): 3.12 SPRAY, METERED RESPIRATORY (INHALATION) at 11:07

## 2025-03-16 RX ADMIN — INSULIN LISPRO 2: 100 INJECTION, SOLUTION INTRAVENOUS; SUBCUTANEOUS at 17:57

## 2025-03-16 RX ADMIN — Medication 1 DOSE(S): at 05:17

## 2025-03-16 RX ADMIN — Medication 25 MILLIGRAM(S): at 22:00

## 2025-03-16 RX ADMIN — INSULIN LISPRO 3 UNIT(S): 100 INJECTION, SOLUTION INTRAVENOUS; SUBCUTANEOUS at 09:17

## 2025-03-16 RX ADMIN — INSULIN LISPRO 0: 100 INJECTION, SOLUTION INTRAVENOUS; SUBCUTANEOUS at 09:17

## 2025-03-16 RX ADMIN — Medication 1 PACKET(S): at 11:06

## 2025-03-16 RX ADMIN — INSULIN LISPRO 3 UNIT(S): 100 INJECTION, SOLUTION INTRAVENOUS; SUBCUTANEOUS at 12:45

## 2025-03-16 RX ADMIN — ISOSORBDIE DINITRATE 10 MILLIGRAM(S): 30 TABLET ORAL at 17:58

## 2025-03-16 RX ADMIN — INSULIN GLARGINE-YFGN 18 UNIT(S): 100 INJECTION, SOLUTION SUBCUTANEOUS at 22:00

## 2025-03-16 RX ADMIN — MEROPENEM 100 MILLIGRAM(S): 1 INJECTION INTRAVENOUS at 05:16

## 2025-03-16 RX ADMIN — ATORVASTATIN CALCIUM 20 MILLIGRAM(S): 80 TABLET, FILM COATED ORAL at 22:00

## 2025-03-16 RX ADMIN — Medication 1 APPLICATION(S): at 15:35

## 2025-03-16 RX ADMIN — ISOSORBDIE DINITRATE 10 MILLIGRAM(S): 30 TABLET ORAL at 11:06

## 2025-03-16 RX ADMIN — Medication 40 MILLIGRAM(S): at 17:57

## 2025-03-16 RX ADMIN — METOPROLOL SUCCINATE 50 MILLIGRAM(S): 50 TABLET, EXTENDED RELEASE ORAL at 09:18

## 2025-03-16 RX ADMIN — Medication 1 SPRAY(S): at 05:17

## 2025-03-16 RX ADMIN — INSULIN LISPRO 3 UNIT(S): 100 INJECTION, SOLUTION INTRAVENOUS; SUBCUTANEOUS at 17:58

## 2025-03-16 RX ADMIN — POLYETHYLENE GLYCOL 3350 17 GRAM(S): 17 POWDER, FOR SOLUTION ORAL at 17:57

## 2025-03-16 RX ADMIN — Medication 100 MILLIGRAM(S): at 11:06

## 2025-03-16 RX ADMIN — MEROPENEM 100 MILLIGRAM(S): 1 INJECTION INTRAVENOUS at 19:35

## 2025-03-16 RX ADMIN — Medication 40 MILLIGRAM(S): at 05:17

## 2025-03-16 RX ADMIN — FOLIC ACID 1 MILLIGRAM(S): 1 TABLET ORAL at 11:06

## 2025-03-16 NOTE — CHART NOTE - NSCHARTNOTEFT_GEN_A_CORE
IR Follow-Up     will plan for tentative intercostal angiography tomorrow 3/17  NPO at midnight  please order additional CTA chest non-con with arterial phase imaging early in the morning for evaluation prior to angiography  please place IR procedure order under Dr. Sherman    --  Noam Quintero DO/BETO/MARYANNE, PGY-6 Chief Resident  Vascular and Interventional Radiology   Available on [employer] Teams    For EMERGENT inquiries/questions:  IR Pager ([facility]): [phone]  IR Pager ([facility]): [phone] ; [phone]    For non-emergent consults/questions:   Please place a sunrise order "Consult- Interventional Radiology" with an appropriate callback number    For questions about scheduling during appropriate work hours, call IR :  [facility]: [phone]  [facility]: [phone]    For outpatient IR booking:  [facility]: [phone]  [facility]: [phone]

## 2025-03-16 NOTE — CHART NOTE - NSCHARTNOTEFT_GEN_A_CORE
Given progressive volume overload refractory to aggressive diuretics, unfortunately there's no intervention that will improve the patient's condition which was shared with his wife. The possibility of dialysis was introduced to her but she was reluctant to the idea and preferred conservative measures. Heart Failure will sign off at this time. Please call with any questions.

## 2025-03-16 NOTE — PROGRESS NOTE ADULT - SUBJECTIVE AND OBJECTIVE BOX
NEPHROLOGY PROGRESS NOTE    CHIEF COMPLAINT:  DENISHA/CKD    HPI:  Getting PRBC transfusion today.  Denies any SOB at rest.  Urine output poor.  Small rise in Cr yesterday noted.    EXAM:  Vital Signs Last 24 Hrs  T(C): 36.7 (16 Mar 2025 10:45), Max: 36.9 (15 Mar 2025 20:16)  T(F): 98.1 (16 Mar 2025 10:45), Max: 98.5 (16 Mar 2025 02:45)  HR: 70 (16 Mar 2025 10:45) (70 - 76)  BP: 106/66 (16 Mar 2025 10:45) (97/58 - 107/67)  BP(mean): --  RR: 18 (16 Mar 2025 10:45) (18 - 18)  SpO2: 98% (16 Mar 2025 10:45) (98% - 99%)    Parameters below as of 16 Mar 2025 10:45  Patient On (Oxygen Delivery Method): nasal cannula  O2 Flow (L/min): 4    I&O's Summary    15 Mar 2025 07:01  -  16 Mar 2025 07:00  --------------------------------------------------------  IN: 360 mL / OUT: 225 mL / NET: 135 mL      Daily     Daily Weight in k.5 (16 Mar 2025 06:23)    Conversant, in no apparent distress  Normal respiratory effort, lungs clear bilaterally  Heart RRR with no murmur, large  peripheral edema    LABS                        6.9    15.41 )-----------( 148      ( 16 Mar 2025 06:49 )             22.0     03-15    132[L]  |  89[L]  |  110[H]  ----------------------------<  202[H]  4.4   |  31  |  3.32[H]    Ca    7.7[L]      15 Mar 2025 14:52    TPro  4.7[L]  /  Alb  2.6[L]  /  TBili  0.6  /  DBili  x   /  AST  23  /  ALT  10  /  AlkPhos  124[H]  03-15      Impression:  CM, EF ~ 30%, severe TR  Adm to [facility] VS 1/10 w/fluid overload   Tx to [facility]  for further management   Known CKD (baseline Cr ~ 2. - 2.5)  Hemodynamic/cardio-renal DENISHA/CKD 3/4;  small worsening following LVP on Friday    Recommend:  Check BMP today  If develops signs/symptoms of fluid excess during/following PRBC please order IV loop diuretic

## 2025-03-16 NOTE — PROGRESS NOTE ADULT - PROBLEM SELECTOR PLAN 1
Sudden drop in Hgb from 7.5 to 5.5 on 3/15 s/p para 3/14  - CTAP with Moderate volume ascites. Layering hyperdensity within the ascites in the right paracolic gutter, suggestive of hemoperitoneum.  - s/p 2 units of pRBC on 3/14  - Hgb 6.9 this morning, will give another unit of pRBC  - IR consulted and following the case  - wife wants conservative management at this time  - hold xarelto

## 2025-03-16 NOTE — PROVIDER CONTACT NOTE (CRITICAL VALUE NOTIFICATION) - ASSESSMENT
Pt AOx4 mentating Pt AOx4 mentating, denies any pain, SOB and no signs of distress. VSS, /64, HR 70, O2 98%, temp 98.5

## 2025-03-16 NOTE — PROGRESS NOTE ADULT - ASSESSMENT
78 y/o male w/ PMHx CAD s/p CABG, HF (combined HFrEF [EF 25%] and HFpEF [G3DD]) w/ ICD, AFib on Xarelto, severe AS s/p TAVR, COPD on 3-4L home O2, CKD4, HTN, HLD, T2DM, and BPH who presents as a transfer from [facility] for HF evaluation. Ongoing CHF exacerbation now off bumex gtt, new DENISHA on CKD likely 2/2 ongoing cardiorenal and possible component of abdominal compartment syndrome given tremendous ascites

## 2025-03-16 NOTE — PROGRESS NOTE ADULT - SUBJECTIVE AND OBJECTIVE BOX
Reba Ingram MD  Division of Hospital Medicine  Please contact via MS Teams (prefer message first)  Office: [phone]    Patient is a 79y old  Male who presents with a chief complaint of HF eval (15 Mar 2025 16:49)      SUBJECTIVE / OVERNIGHT EVENTS:  no acute events overnight, vss, afebrile  pt more awake, alert this morning. As per wife, he ate good breakfast  wife thinks that he is doing better and still wants conservative management  concerned about decreased urine output as well as worsening kidney function    ROS:  14 point ROS negative in detail except stated as above    MEDICATIONS  (STANDING):  allopurinol 100 milliGRAM(s) Oral daily  atorvastatin 20 milliGRAM(s) Oral at bedtime  dextrose 5%. 1000 milliLiter(s) (50 mL/Hr) IV Continuous <Continuous>  dextrose 5%. 1000 milliLiter(s) (100 mL/Hr) IV Continuous <Continuous>  dextrose 50% Injectable 25 Gram(s) IV Push once  dextrose 50% Injectable 12.5 Gram(s) IV Push once  dextrose 50% Injectable 25 Gram(s) IV Push once  epoetin lashawn (EPOGEN) Injectable 29709 Unit(s) SubCutaneous every 7 days  fluticasone propionate/ salmeterol 250-50 MICROgram(s) Diskus 1 Dose(s) Inhalation two times a day  folic acid 1 milliGRAM(s) Oral daily  glucagon  Injectable 1 milliGRAM(s) IntraMuscular once  hydrALAZINE 25 milliGRAM(s) Oral three times a day  insulin glargine Injectable (LANTUS) 18 Unit(s) SubCutaneous at bedtime  insulin lispro (ADMELOG) corrective regimen sliding scale   SubCutaneous three times a day before meals  insulin lispro Injectable (ADMELOG) 3 Unit(s) SubCutaneous three times a day before meals  isosorbide   dinitrate Tablet (ISORDIL) 10 milliGRAM(s) Oral three times a day  meropenem  IVPB      meropenem  IVPB 500 milliGRAM(s) IV Intermittent every 12 hours  metoprolol succinate ER 50 milliGRAM(s) Oral daily  pantoprazole    Tablet 40 milliGRAM(s) Oral every 12 hours  polyethylene glycol 3350 17 Gram(s) Oral two times a day  psyllium Powder 1 Packet(s) Oral daily  sodium chloride 0.65% Nasal 1 Spray(s) Both Nostrils two times a day  tamsulosin 0.4 milliGRAM(s) Oral at bedtime  tiotropium 2.5 MICROgram(s) Inhaler 2 Puff(s) Inhalation daily    MEDICATIONS  (PRN):  acetaminophen     Tablet .. 650 milliGRAM(s) Oral every 6 hours PRN Temp greater or equal to 38C (100.4F), Mild Pain (1 - 3)  albuterol/ipratropium for Nebulization 3 milliLiter(s) Nebulizer every 6 hours PRN Shortness of Breath and/or Wheezing  bisacodyl 5 milliGRAM(s) Oral every 12 hours PRN Constipation  dextrose Oral Gel 15 Gram(s) Oral once PRN Blood Glucose LESS THAN 70 milliGRAM(s)/deciliter  dextrose Oral Gel 15 Gram(s) Oral once PRN Blood Glucose LESS THAN 70 milliGRAM(s)/deciliter  melatonin 3 milliGRAM(s) Oral at bedtime PRN Insomnia      CAPILLARY BLOOD GLUCOSE      POCT Blood Glucose.: 134 mg/dL (16 Mar 2025 08:31)  POCT Blood Glucose.: 214 mg/dL (15 Mar 2025 21:25)  POCT Blood Glucose.: 265 mg/dL (15 Mar 2025 17:35)  POCT Blood Glucose.: 248 mg/dL (15 Mar 2025 12:29)    I&O's Summary    15 Mar 2025 07:01  -  16 Mar 2025 07:00  --------------------------------------------------------  IN: 360 mL / OUT: 225 mL / NET: 135 mL        PHYSICAL EXAM:  Vital Signs Last 24 Hrs  T(C): 36.8 (16 Mar 2025 04:07), Max: 36.9 (15 Mar 2025 20:16)  T(F): 98.2 (16 Mar 2025 04:07), Max: 98.5 (16 Mar 2025 02:45)  HR: 70 (16 Mar 2025 04:07) (70 - 88)  BP: 97/63 (16 Mar 2025 04:07) (97/58 - 107/67)  BP(mean): --  RR: 18 (16 Mar 2025 04:07) (18 - 18)  SpO2: 99% (16 Mar 2025 04:07) (98% - 99%)    Parameters below as of 16 Mar 2025 04:07  Patient On (Oxygen Delivery Method): nasal cannula  O2 Flow (L/min): 4    GENERAL: NAD, well-developed  HEAD:  Atraumatic, Normocephalic  EYES: EOMI, PERRLA, conjunctiva and sclera clear  NECK: Supple, No JVD  CHEST/LUNG: Clear to auscultation bilaterally; No wheeze  HEART: Regular rate and rhythm; No murmurs, rubs, or gallops  ABDOMEN: tender to palpate  EXTREMITIES:  4+ pitting edema  NEUROLOGY: AAOx3; non-focal  SKIN: No rashes or lesions    LABS:                        6.9    15.41 )-----------( 148      ( 16 Mar 2025 06:49 )             22.0     03-15    132[L]  |  89[L]  |  110[H]  ----------------------------<  202[H]  4.4   |  31  |  3.32[H]    Ca    7.7[L]      15 Mar 2025 14:52    TPro  4.7[L]  /  Alb  2.6[L]  /  TBili  0.6  /  DBili  x   /  AST  23  /  ALT  10  /  AlkPhos  124[H]  03-15          Urinalysis Basic - ( 15 Mar 2025 14:52 )    Color: x / Appearance: x / SG: x / pH: x  Gluc: 202 mg/dL / Ketone: x  / Bili: x / Urobili: x   Blood: x / Protein: x / Nitrite: x   Leuk Esterase: x / RBC: x / WBC x   Sq Epi: x / Non Sq Epi: x / Bacteria: x        RADIOLOGY & ADDITIONAL TESTS:    Imaging Personally Reviewed:  < from: CT Abdomen and Pelvis No Cont (03.15.25 @ 09:13) >  Moderate volume ascites. Layering hyperdensity within the ascites in the   right paracolic gutter, suggestive of hemoperitoneum.  No retroperitoneal hemorrhage.    < end of copied text >      Consultant(s) Notes Reviewed:  IR, nephro    Care Discussed with Consultants/Other Providers:

## 2025-03-16 NOTE — CHART NOTE - NSCHARTNOTEFT_GEN_A_CORE
IR Follow-Up     patient has been responding appropriately to blood products and is hemodynamically stable  please reconsult as needed    --  Noam Quintero DO/BETO/MARYANNE, PGY-6 Chief Resident  Vascular and Interventional Radiology   Available on [employer] Teams    For EMERGENT inquiries/questions:  IR Pager ([facility]): [phone]  IR Pager ([facility]): [phone] ; [phone]    For non-emergent consults/questions:   Please place a sunrise order "Consult- Interventional Radiology" with an appropriate callback number    For questions about scheduling during appropriate work hours, call IR :  [facility]: [phone]  [facility]: [phone]    For outpatient IR booking:  [facility]: [phone]  [facility]: [phone]

## 2025-03-17 LAB
ALBUMIN SERPL ELPH-MCNC: 3.2 G/DL — LOW (ref 3.3–5)
ALP SERPL-CCNC: 152 U/L — HIGH (ref 40–120)
ALT FLD-CCNC: 10 U/L — SIGNIFICANT CHANGE UP (ref 10–45)
ANION GAP SERPL CALC-SCNC: 17 MMOL/L — SIGNIFICANT CHANGE UP (ref 5–17)
AST SERPL-CCNC: 19 U/L — SIGNIFICANT CHANGE UP (ref 10–40)
BILIRUB SERPL-MCNC: 0.7 MG/DL — SIGNIFICANT CHANGE UP (ref 0.2–1.2)
BUN SERPL-MCNC: 109 MG/DL — HIGH (ref 7–23)
CALCIUM SERPL-MCNC: 7.1 MG/DL — LOW (ref 8.4–10.5)
CHLORIDE SERPL-SCNC: 85 MMOL/L — LOW (ref 96–108)
CO2 SERPL-SCNC: 31 MMOL/L — SIGNIFICANT CHANGE UP (ref 22–31)
CREAT SERPL-MCNC: 4.86 MG/DL — HIGH (ref 0.5–1.3)
EGFR: 11 ML/MIN/1.73M2 — LOW
EGFR: 11 ML/MIN/1.73M2 — LOW
GLUCOSE BLDC GLUCOMTR-MCNC: 109 MG/DL — HIGH (ref 70–99)
GLUCOSE BLDC GLUCOMTR-MCNC: 136 MG/DL — HIGH (ref 70–99)
GLUCOSE BLDC GLUCOMTR-MCNC: 138 MG/DL — HIGH (ref 70–99)
GLUCOSE BLDC GLUCOMTR-MCNC: 149 MG/DL — HIGH (ref 70–99)
GLUCOSE SERPL-MCNC: 94 MG/DL — SIGNIFICANT CHANGE UP (ref 70–99)
HCT VFR BLD CALC: 24.7 % — LOW (ref 39–50)
HCT VFR BLD CALC: 30.5 % — LOW (ref 39–50)
HGB BLD-MCNC: 7.7 G/DL — LOW (ref 13–17)
HGB BLD-MCNC: 9.9 G/DL — LOW (ref 13–17)
MCHC RBC-ENTMCNC: 26.5 PG — LOW (ref 27–34)
MCHC RBC-ENTMCNC: 27.7 PG — SIGNIFICANT CHANGE UP (ref 27–34)
MCHC RBC-ENTMCNC: 31.2 G/DL — LOW (ref 32–36)
MCHC RBC-ENTMCNC: 32.5 G/DL — SIGNIFICANT CHANGE UP (ref 32–36)
MCV RBC AUTO: 84.9 FL — SIGNIFICANT CHANGE UP (ref 80–100)
MCV RBC AUTO: 85.4 FL — SIGNIFICANT CHANGE UP (ref 80–100)
MRSA PCR RESULT.: SIGNIFICANT CHANGE UP
NRBC BLD AUTO-RTO: 0 /100 WBCS — SIGNIFICANT CHANGE UP (ref 0–0)
NRBC BLD AUTO-RTO: 0 /100 WBCS — SIGNIFICANT CHANGE UP (ref 0–0)
PLATELET # BLD AUTO: 146 K/UL — LOW (ref 150–400)
PLATELET # BLD AUTO: 146 K/UL — LOW (ref 150–400)
POTASSIUM SERPL-MCNC: 5 MMOL/L — SIGNIFICANT CHANGE UP (ref 3.5–5.3)
POTASSIUM SERPL-SCNC: 5 MMOL/L — SIGNIFICANT CHANGE UP (ref 3.5–5.3)
PROT SERPL-MCNC: 6.3 G/DL — SIGNIFICANT CHANGE UP (ref 6–8.3)
RBC # BLD: 2.91 M/UL — LOW (ref 4.2–5.8)
RBC # BLD: 3.57 M/UL — LOW (ref 4.2–5.8)
RBC # FLD: 16.6 % — HIGH (ref 10.3–14.5)
RBC # FLD: 17.3 % — HIGH (ref 10.3–14.5)
S AUREUS DNA NOSE QL NAA+PROBE: DETECTED
SODIUM SERPL-SCNC: 133 MMOL/L — LOW (ref 135–145)
WBC # BLD: 14.96 K/UL — HIGH (ref 3.8–10.5)
WBC # BLD: 17.51 K/UL — HIGH (ref 3.8–10.5)
WBC # FLD AUTO: 14.96 K/UL — HIGH (ref 3.8–10.5)
WBC # FLD AUTO: 17.51 K/UL — HIGH (ref 3.8–10.5)

## 2025-03-17 PROCEDURE — 99233 SBSQ HOSP IP/OBS HIGH 50: CPT

## 2025-03-17 RX ADMIN — TIOTROPIUM BROMIDE INHALATION SPRAY 2 PUFF(S): 3.12 SPRAY, METERED RESPIRATORY (INHALATION) at 11:13

## 2025-03-17 RX ADMIN — INSULIN GLARGINE-YFGN 18 UNIT(S): 100 INJECTION, SOLUTION SUBCUTANEOUS at 21:36

## 2025-03-17 RX ADMIN — Medication 1 APPLICATION(S): at 11:13

## 2025-03-17 RX ADMIN — Medication 1 PACKET(S): at 11:15

## 2025-03-17 RX ADMIN — MEROPENEM 100 MILLIGRAM(S): 1 INJECTION INTRAVENOUS at 05:07

## 2025-03-17 RX ADMIN — Medication 100 MILLIGRAM(S): at 11:13

## 2025-03-17 RX ADMIN — Medication 25 MILLIGRAM(S): at 13:40

## 2025-03-17 RX ADMIN — MEROPENEM 100 MILLIGRAM(S): 1 INJECTION INTRAVENOUS at 17:27

## 2025-03-17 RX ADMIN — Medication 40 MILLIGRAM(S): at 05:11

## 2025-03-17 RX ADMIN — Medication 25 MILLIGRAM(S): at 05:11

## 2025-03-17 RX ADMIN — BUMETANIDE 20 MG/HR: 1 TABLET ORAL at 02:39

## 2025-03-17 RX ADMIN — POLYETHYLENE GLYCOL 3350 17 GRAM(S): 17 POWDER, FOR SOLUTION ORAL at 05:11

## 2025-03-17 RX ADMIN — FOLIC ACID 1 MILLIGRAM(S): 1 TABLET ORAL at 11:10

## 2025-03-17 RX ADMIN — ISOSORBDIE DINITRATE 10 MILLIGRAM(S): 30 TABLET ORAL at 11:10

## 2025-03-17 RX ADMIN — BUMETANIDE 20 MG/HR: 1 TABLET ORAL at 07:37

## 2025-03-17 RX ADMIN — Medication 1 DOSE(S): at 05:10

## 2025-03-17 RX ADMIN — METOPROLOL SUCCINATE 50 MILLIGRAM(S): 50 TABLET, EXTENDED RELEASE ORAL at 08:43

## 2025-03-17 RX ADMIN — INSULIN LISPRO 3 UNIT(S): 100 INJECTION, SOLUTION INTRAVENOUS; SUBCUTANEOUS at 12:48

## 2025-03-17 RX ADMIN — ATORVASTATIN CALCIUM 20 MILLIGRAM(S): 80 TABLET, FILM COATED ORAL at 21:36

## 2025-03-17 RX ADMIN — Medication 1 SPRAY(S): at 05:10

## 2025-03-17 RX ADMIN — Medication 40 MILLIGRAM(S): at 17:24

## 2025-03-17 RX ADMIN — Medication 1 SPRAY(S): at 17:27

## 2025-03-17 RX ADMIN — Medication 1 DOSE(S): at 17:25

## 2025-03-17 RX ADMIN — EPOETIN ALFA 10000 UNIT(S): 10000 SOLUTION INTRAVENOUS; SUBCUTANEOUS at 13:39

## 2025-03-17 RX ADMIN — ISOSORBDIE DINITRATE 10 MILLIGRAM(S): 30 TABLET ORAL at 05:11

## 2025-03-17 RX ADMIN — IPRATROPIUM BROMIDE AND ALBUTEROL SULFATE 3 MILLILITER(S): .5; 2.5 SOLUTION RESPIRATORY (INHALATION) at 11:10

## 2025-03-17 RX ADMIN — POLYETHYLENE GLYCOL 3350 17 GRAM(S): 17 POWDER, FOR SOLUTION ORAL at 17:23

## 2025-03-17 RX ADMIN — TAMSULOSIN HYDROCHLORIDE 0.4 MILLIGRAM(S): 0.4 CAPSULE ORAL at 21:36

## 2025-03-17 RX ADMIN — Medication 25 MILLIGRAM(S): at 21:36

## 2025-03-17 RX ADMIN — ISOSORBDIE DINITRATE 10 MILLIGRAM(S): 30 TABLET ORAL at 17:24

## 2025-03-17 RX ADMIN — INSULIN LISPRO 3 UNIT(S): 100 INJECTION, SOLUTION INTRAVENOUS; SUBCUTANEOUS at 17:23

## 2025-03-17 NOTE — PROGRESS NOTE ADULT - PROBLEM SELECTOR PLAN 3
- Likely DENISHA in setting of cardiorenal syndrome  - Continue monitoring Cr and urine output. Nephrology following, appreciate recs.    #Leukocytosis- rising white count over the past couple days; also with abd distension and pain. Ddx includes severe constipation given suboptimal BM vs. less likely SBP from cardiac ascites vs. possible UTI  - UA positive, started renally dosed meropenem, f/u ucx  - pt with large bm x 2, cont aggressive bowel regimen  - f/u dx/tx para given large ascites on imaging  - cont trending cbc

## 2025-03-17 NOTE — PROGRESS NOTE ADULT - PROBLEM SELECTOR PLAN 1
Sudden drop in Hgb from 7.5 to 5.5 on 3/15 s/p para 3/14  - DENISHA precludes safe DDAVP use, cont transfusing  - CTAP with Moderate volume ascites. Layering hyperdensity within the ascites in the right paracolic gutter, suggestive of hemoperitoneum.  - s/p 2 units of pRBC on 3/14, 1 unit prbc 3/16, 3/17  - IR consulted and following the case  - wife wants conservative management at this time  - hold xarelto/aspirin

## 2025-03-17 NOTE — PROGRESS NOTE ADULT - ASSESSMENT
78 y/o male w/ PMHx CAD s/p CABG, HF (combined HFrEF [EF 25%] and HFpEF [G3DD]) w/ ICD, AFib on Xarelto, severe AS s/p TAVR, COPD on 3-4L home O2, CKD4, HTN, HLD, T2DM, and BPH who presents as a transfer from [facility] for HF evaluation. Ongoing CHF exacerbation now off bumex gtt, new DENISHA on CKD likely 2/2 ongoing cardiorenal and possible component of abdominal compartment syndrome given tremendous ascites

## 2025-03-17 NOTE — PROGRESS NOTE ADULT - SUBJECTIVE AND OBJECTIVE BOX
Events noted, on NC O2    Vital Signs Last 24 Hrs  T(C): 36.6 (03-17-25 @ 15:44), Max: 36.8 (03-16-25 @ 20:41)  T(F): 97.9 (03-17-25 @ 15:44), Max: 98.3 (03-16-25 @ 20:41)  HR: 70 (03-17-25 @ 15:44) (70 - 73)  BP: 114/71 (03-17-25 @ 15:44) (103/63 - 123/77)  RR: 18 (03-17-25 @ 15:44) (18 - 18)  SpO2: 98% (03-17-25 @ 15:44) (98% - 99%)    17 Mar 2025 07:01  -  17 Mar 2025 17:37  --------------------------------------------------------  IN:    Oral Fluid: 180 mL  Total IN: 180 mL    OUT:    Indwelling Catheter - Urethral (mL): 450 mL  Total OUT: 450 mL    s1s2  b/l air entry  soft, ascites   edema                                                                                                                        9.9    17.51 )-----------( 146      ( 17 Mar 2025 16:15 )             30.5     17 Mar 2025 06:38    133    |  85     |  109    ----------------------------<  94     5.0     |  31     |  4.86     Ca    7.1        17 Mar 2025 06:38    TPro  6.3    /  Alb  3.2    /  TBili  0.7    /  DBili  x      /  AST  19     /  ALT  10     /  AlkPhos  152    17 Mar 2025 06:38    LIVER FUNCTIONS - ( 17 Mar 2025 06:38 )  Alb: 3.2 g/dL / Pro: 6.3 g/dL / ALK PHOS: 152 U/L / ALT: 10 U/L / AST: 19 U/L / GGT: x           acetaminophen     Tablet .. 650 milliGRAM(s) Oral every 6 hours PRN  albuterol/ipratropium for Nebulization 3 milliLiter(s) Nebulizer every 6 hours PRN  allopurinol 100 milliGRAM(s) Oral daily  atorvastatin 20 milliGRAM(s) Oral at bedtime  bisacodyl 5 milliGRAM(s) Oral every 12 hours PRN  chlorhexidine 2% Cloths 1 Application(s) Topical daily  dextrose 5%. 1000 milliLiter(s) IV Continuous <Continuous>  dextrose 5%. 1000 milliLiter(s) IV Continuous <Continuous>  dextrose 50% Injectable 25 Gram(s) IV Push once  dextrose 50% Injectable 12.5 Gram(s) IV Push once  dextrose 50% Injectable 25 Gram(s) IV Push once  dextrose Oral Gel 15 Gram(s) Oral once PRN  dextrose Oral Gel 15 Gram(s) Oral once PRN  epoetin lashawn (EPOGEN) Injectable 53910 Unit(s) SubCutaneous every 7 days  fluticasone propionate/ salmeterol 250-50 MICROgram(s) Diskus 1 Dose(s) Inhalation two times a day  folic acid 1 milliGRAM(s) Oral daily  glucagon  Injectable 1 milliGRAM(s) IntraMuscular once  hydrALAZINE 25 milliGRAM(s) Oral three times a day  insulin glargine Injectable (LANTUS) 18 Unit(s) SubCutaneous at bedtime  insulin lispro (ADMELOG) corrective regimen sliding scale   SubCutaneous three times a day before meals  insulin lispro Injectable (ADMELOG) 3 Unit(s) SubCutaneous three times a day before meals  isosorbide   dinitrate Tablet (ISORDIL) 10 milliGRAM(s) Oral three times a day  melatonin 3 milliGRAM(s) Oral at bedtime PRN  meropenem  IVPB 500 milliGRAM(s) IV Intermittent every 12 hours  meropenem  IVPB      metoprolol succinate ER 50 milliGRAM(s) Oral daily  pantoprazole    Tablet 40 milliGRAM(s) Oral every 12 hours  polyethylene glycol 3350 17 Gram(s) Oral two times a day  psyllium Powder 1 Packet(s) Oral daily  sodium chloride 0.65% Nasal 1 Spray(s) Both Nostrils two times a day  tamsulosin 0.4 milliGRAM(s) Oral at bedtime  tiotropium 2.5 MICROgram(s) Inhaler 2 Puff(s) Inhalation daily    A/P:    CM, EF ~ 30%, severe TR  Adm to [facility] VS 1/10 w/fluid overload   Tx to [facility] 2/5 for further management   Known CKD (baseline Cr ~ 2. - 2.5)  Hemodynamic/cardio-renal   S/p LVP 3/14 w/4.5L fluid removal  S/p SPA x 4   Dropped Hgb to 5.3 on 3/15, s/p PRBCs   Sharp rise in Cr iso above   Would hold diuretics unless worsening SOB  F/u BMP, UO   D/w medicine   Per pt and wife known wishes, no dialysis  Conservative approach   Recommend palliative f/u    [phone]

## 2025-03-17 NOTE — PROGRESS NOTE ADULT - SUBJECTIVE AND OBJECTIVE BOX
Contreras Brooks MD  Division of Hospital Medicine  Available on [employer] teams until 7pm  If no response or off-hours, page [phone]  -------------------------------------    Patient is a 79y old  Male who presents with a chief complaint of HF eval (16 Mar 2025 11:02)      SUBJECTIVE / OVERNIGHT EVENTS: none acute  ADDITIONAL REVIEW OF SYSTEMS: pt notes some recurrence of abd discomfort, breathing otherwise unchanged.     MEDICATIONS  (STANDING):  allopurinol 100 milliGRAM(s) Oral daily  atorvastatin 20 milliGRAM(s) Oral at bedtime  chlorhexidine 2% Cloths 1 Application(s) Topical daily  dextrose 5%. 1000 milliLiter(s) (50 mL/Hr) IV Continuous <Continuous>  dextrose 5%. 1000 milliLiter(s) (100 mL/Hr) IV Continuous <Continuous>  dextrose 50% Injectable 25 Gram(s) IV Push once  dextrose 50% Injectable 12.5 Gram(s) IV Push once  dextrose 50% Injectable 25 Gram(s) IV Push once  epoetin lashawn (EPOGEN) Injectable 75742 Unit(s) SubCutaneous every 7 days  fluticasone propionate/ salmeterol 250-50 MICROgram(s) Diskus 1 Dose(s) Inhalation two times a day  folic acid 1 milliGRAM(s) Oral daily  glucagon  Injectable 1 milliGRAM(s) IntraMuscular once  hydrALAZINE 25 milliGRAM(s) Oral three times a day  insulin glargine Injectable (LANTUS) 18 Unit(s) SubCutaneous at bedtime  insulin lispro (ADMELOG) corrective regimen sliding scale   SubCutaneous three times a day before meals  insulin lispro Injectable (ADMELOG) 3 Unit(s) SubCutaneous three times a day before meals  isosorbide   dinitrate Tablet (ISORDIL) 10 milliGRAM(s) Oral three times a day  meropenem  IVPB      meropenem  IVPB 500 milliGRAM(s) IV Intermittent every 12 hours  metoprolol succinate ER 50 milliGRAM(s) Oral daily  pantoprazole    Tablet 40 milliGRAM(s) Oral every 12 hours  polyethylene glycol 3350 17 Gram(s) Oral two times a day  psyllium Powder 1 Packet(s) Oral daily  sodium chloride 0.65% Nasal 1 Spray(s) Both Nostrils two times a day  tamsulosin 0.4 milliGRAM(s) Oral at bedtime  tiotropium 2.5 MICROgram(s) Inhaler 2 Puff(s) Inhalation daily    MEDICATIONS  (PRN):  acetaminophen     Tablet .. 650 milliGRAM(s) Oral every 6 hours PRN Temp greater or equal to 38C (100.4F), Mild Pain (1 - 3)  albuterol/ipratropium for Nebulization 3 milliLiter(s) Nebulizer every 6 hours PRN Shortness of Breath and/or Wheezing  bisacodyl 5 milliGRAM(s) Oral every 12 hours PRN Constipation  dextrose Oral Gel 15 Gram(s) Oral once PRN Blood Glucose LESS THAN 70 milliGRAM(s)/deciliter  dextrose Oral Gel 15 Gram(s) Oral once PRN Blood Glucose LESS THAN 70 milliGRAM(s)/deciliter  melatonin 3 milliGRAM(s) Oral at bedtime PRN Insomnia      CAPILLARY BLOOD GLUCOSE      POCT Blood Glucose.: 109 mg/dL (17 Mar 2025 12:43)  POCT Blood Glucose.: 149 mg/dL (17 Mar 2025 08:40)  POCT Blood Glucose.: 137 mg/dL (16 Mar 2025 21:29)  POCT Blood Glucose.: 165 mg/dL (16 Mar 2025 17:32)    I&O's Summary    16 Mar 2025 07:01  -  17 Mar 2025 07:00  --------------------------------------------------------  IN: 260 mL / OUT: 100 mL / NET: 160 mL    17 Mar 2025 07:01  -  17 Mar 2025 15:09  --------------------------------------------------------  IN: 180 mL / OUT: 450 mL / NET: -270 mL        PHYSICAL EXAM:  Vital Signs Last 24 Hrs  T(C): 36.7 (17 Mar 2025 11:46), Max: 36.8 (16 Mar 2025 20:41)  T(F): 98 (17 Mar 2025 11:46), Max: 98.3 (16 Mar 2025 20:41)  HR: 70 (17 Mar 2025 11:46) (70 - 73)  BP: 123/77 (17 Mar 2025 11:46) (103/63 - 123/77)  BP(mean): --  RR: 18 (17 Mar 2025 11:46) (18 - 18)  SpO2: 99% (17 Mar 2025 11:46) (98% - 99%)    Parameters below as of 17 Mar 2025 11:46  Patient On (Oxygen Delivery Method): nasal cannula      CONSTITUTIONAL: NAD  EYES: PERRLA; conjunctiva and sclera clear  ENMT: MMM  NECK: Supple  RESPIRATORY: Normal respiratory effort; CTAB  CARDIOVASCULAR: RRR, no JVD, no peripheral edema   ABDOMEN: abd distention, soft/nontender  MUSCLOSKELETAL:  no clubbing/cyanosis, no joint swelling or tenderness to palpation  PSYCH: A+O x 2-3, affect normal  NEUROLOGY: CN 2-12 are intact and symmetric; no gross sensory or motor deficits  SKIN: No rashes; no palpable lesions    LABS:                        7.7    14.96 )-----------( 146      ( 17 Mar 2025 06:39 )             24.7     03-17    133[L]  |  85[L]  |  109[H]  ----------------------------<  94  5.0   |  31  |  4.86[H]    Ca    7.1[L]      17 Mar 2025 06:38    TPro  6.3  /  Alb  3.2[L]  /  TBili  0.7  /  DBili  x   /  AST  19  /  ALT  10  /  AlkPhos  152[H]  03-17          Urinalysis Basic - ( 17 Mar 2025 06:38 )    Color: x / Appearance: x / SG: x / pH: x  Gluc: 94 mg/dL / Ketone: x  / Bili: x / Urobili: x   Blood: x / Protein: x / Nitrite: x   Leuk Esterase: x / RBC: x / WBC x   Sq Epi: x / Non Sq Epi: x / Bacteria: x          RADIOLOGY & ADDITIONAL TESTS:  Results Reviewed:   Imaging Personally Reviewed:  Electrocardiogram Personally Reviewed:    COORDINATION OF CARE:  Care Discussed with Consultants/Other Providers [Y/N]: renal  Prior or Outpatient Records Reviewed [Y/N]:

## 2025-03-18 LAB
ANION GAP SERPL CALC-SCNC: 16 MMOL/L — SIGNIFICANT CHANGE UP (ref 5–17)
BUN SERPL-MCNC: 106 MG/DL — HIGH (ref 7–23)
CALCIUM SERPL-MCNC: 8.4 MG/DL — SIGNIFICANT CHANGE UP (ref 8.4–10.5)
CHLORIDE SERPL-SCNC: 84 MMOL/L — LOW (ref 96–108)
CO2 SERPL-SCNC: 30 MMOL/L — SIGNIFICANT CHANGE UP (ref 22–31)
CREAT SERPL-MCNC: 5.22 MG/DL — HIGH (ref 0.5–1.3)
EGFR: 10 ML/MIN/1.73M2 — LOW
EGFR: 10 ML/MIN/1.73M2 — LOW
GLUCOSE BLDC GLUCOMTR-MCNC: 103 MG/DL — HIGH (ref 70–99)
GLUCOSE BLDC GLUCOMTR-MCNC: 115 MG/DL — HIGH (ref 70–99)
GLUCOSE BLDC GLUCOMTR-MCNC: 125 MG/DL — HIGH (ref 70–99)
GLUCOSE BLDC GLUCOMTR-MCNC: 175 MG/DL — HIGH (ref 70–99)
GLUCOSE BLDC GLUCOMTR-MCNC: 179 MG/DL — HIGH (ref 70–99)
GLUCOSE SERPL-MCNC: 181 MG/DL — HIGH (ref 70–99)
HCT VFR BLD CALC: 28.5 % — LOW (ref 39–50)
HGB BLD-MCNC: 9 G/DL — LOW (ref 13–17)
MAGNESIUM SERPL-MCNC: 2.7 MG/DL — HIGH (ref 1.6–2.6)
MCHC RBC-ENTMCNC: 27.3 PG — SIGNIFICANT CHANGE UP (ref 27–34)
MCHC RBC-ENTMCNC: 31.6 G/DL — LOW (ref 32–36)
MCV RBC AUTO: 86.4 FL — SIGNIFICANT CHANGE UP (ref 80–100)
NON-GYNECOLOGICAL CYTOLOGY STUDY: SIGNIFICANT CHANGE UP
NRBC BLD AUTO-RTO: 0 /100 WBCS — SIGNIFICANT CHANGE UP (ref 0–0)
PHOSPHATE SERPL-MCNC: 6.8 MG/DL — HIGH (ref 2.5–4.5)
PLATELET # BLD AUTO: 143 K/UL — LOW (ref 150–400)
POTASSIUM SERPL-MCNC: 4.4 MMOL/L — SIGNIFICANT CHANGE UP (ref 3.5–5.3)
POTASSIUM SERPL-SCNC: 4.4 MMOL/L — SIGNIFICANT CHANGE UP (ref 3.5–5.3)
RBC # BLD: 3.3 M/UL — LOW (ref 4.2–5.8)
RBC # FLD: 16.8 % — HIGH (ref 10.3–14.5)
SODIUM SERPL-SCNC: 130 MMOL/L — LOW (ref 135–145)
WBC # BLD: 14.74 K/UL — HIGH (ref 3.8–10.5)
WBC # FLD AUTO: 14.74 K/UL — HIGH (ref 3.8–10.5)

## 2025-03-18 PROCEDURE — 99233 SBSQ HOSP IP/OBS HIGH 50: CPT

## 2025-03-18 PROCEDURE — 74018 RADEX ABDOMEN 1 VIEW: CPT | Mod: 26

## 2025-03-18 RX ORDER — MEROPENEM 1 G/30ML
500 INJECTION INTRAVENOUS EVERY 12 HOURS
Refills: 0 | Status: COMPLETED | OUTPATIENT
Start: 2025-03-18 | End: 2025-03-20

## 2025-03-18 RX ORDER — ONDANSETRON HCL/PF 4 MG/2 ML
4 VIAL (ML) INJECTION EVERY 6 HOURS
Refills: 0 | Status: DISCONTINUED | OUTPATIENT
Start: 2025-03-18 | End: 2025-04-14

## 2025-03-18 RX ADMIN — MEROPENEM 100 MILLIGRAM(S): 1 INJECTION INTRAVENOUS at 17:03

## 2025-03-18 RX ADMIN — Medication 25 MILLIGRAM(S): at 21:55

## 2025-03-18 RX ADMIN — Medication 25 MILLIGRAM(S): at 05:11

## 2025-03-18 RX ADMIN — ATORVASTATIN CALCIUM 20 MILLIGRAM(S): 80 TABLET, FILM COATED ORAL at 21:56

## 2025-03-18 RX ADMIN — Medication 667 MILLIGRAM(S): at 17:07

## 2025-03-18 RX ADMIN — Medication 1 DOSE(S): at 05:11

## 2025-03-18 RX ADMIN — ISOSORBDIE DINITRATE 10 MILLIGRAM(S): 30 TABLET ORAL at 17:05

## 2025-03-18 RX ADMIN — Medication 40 MILLIGRAM(S): at 05:11

## 2025-03-18 RX ADMIN — ISOSORBDIE DINITRATE 10 MILLIGRAM(S): 30 TABLET ORAL at 05:11

## 2025-03-18 RX ADMIN — TAMSULOSIN HYDROCHLORIDE 0.4 MILLIGRAM(S): 0.4 CAPSULE ORAL at 21:56

## 2025-03-18 RX ADMIN — Medication 40 MILLIGRAM(S): at 17:05

## 2025-03-18 RX ADMIN — Medication 1 SPRAY(S): at 17:08

## 2025-03-18 RX ADMIN — TIOTROPIUM BROMIDE INHALATION SPRAY 2 PUFF(S): 3.12 SPRAY, METERED RESPIRATORY (INHALATION) at 11:28

## 2025-03-18 RX ADMIN — INSULIN LISPRO 0: 100 INJECTION, SOLUTION INTRAVENOUS; SUBCUTANEOUS at 17:19

## 2025-03-18 RX ADMIN — FOLIC ACID 1 MILLIGRAM(S): 1 TABLET ORAL at 11:28

## 2025-03-18 RX ADMIN — INSULIN GLARGINE-YFGN 18 UNIT(S): 100 INJECTION, SOLUTION SUBCUTANEOUS at 21:57

## 2025-03-18 RX ADMIN — Medication 667 MILLIGRAM(S): at 12:26

## 2025-03-18 RX ADMIN — INSULIN LISPRO 2: 100 INJECTION, SOLUTION INTRAVENOUS; SUBCUTANEOUS at 08:44

## 2025-03-18 RX ADMIN — INSULIN LISPRO 3 UNIT(S): 100 INJECTION, SOLUTION INTRAVENOUS; SUBCUTANEOUS at 12:38

## 2025-03-18 RX ADMIN — MEROPENEM 100 MILLIGRAM(S): 1 INJECTION INTRAVENOUS at 05:10

## 2025-03-18 RX ADMIN — Medication 1 APPLICATION(S): at 11:29

## 2025-03-18 RX ADMIN — Medication 100 MILLIGRAM(S): at 11:28

## 2025-03-18 RX ADMIN — Medication 1 PACKET(S): at 11:27

## 2025-03-18 RX ADMIN — POLYETHYLENE GLYCOL 3350 17 GRAM(S): 17 POWDER, FOR SOLUTION ORAL at 17:05

## 2025-03-18 RX ADMIN — Medication 1 DOSE(S): at 17:18

## 2025-03-18 RX ADMIN — INSULIN LISPRO 2: 100 INJECTION, SOLUTION INTRAVENOUS; SUBCUTANEOUS at 12:37

## 2025-03-18 RX ADMIN — INSULIN LISPRO 3 UNIT(S): 100 INJECTION, SOLUTION INTRAVENOUS; SUBCUTANEOUS at 08:45

## 2025-03-18 RX ADMIN — ISOSORBDIE DINITRATE 10 MILLIGRAM(S): 30 TABLET ORAL at 11:27

## 2025-03-18 RX ADMIN — Medication 25 MILLIGRAM(S): at 14:38

## 2025-03-18 RX ADMIN — METOPROLOL SUCCINATE 50 MILLIGRAM(S): 50 TABLET, EXTENDED RELEASE ORAL at 08:45

## 2025-03-18 RX ADMIN — Medication 1 SPRAY(S): at 05:11

## 2025-03-18 NOTE — PROGRESS NOTE ADULT - ASSESSMENT
78 y/o male w/ PMHx CAD s/p CABG, HF (combined HFrEF [EF 25%] and HFpEF [G3DD]) w/ ICD, AFib on Xarelto, severe AS s/p TAVR, COPD on 3-4L home O2, CKD4, HTN, HLD, T2DM, and BPH who presents as a transfer from [facility] for HF evaluation. Ongoing CHF exacerbation now off bumex gtt, new DENISHA on CKD likely 2/2 ongoing cardiorenal and possible component of abdominal compartment syndrome given tremendous ascites

## 2025-03-18 NOTE — PROGRESS NOTE ADULT - PROBLEM SELECTOR PLAN 1
Sudden drop in Hgb from 7.5 to 5.5 on 3/15 s/p para 3/14  - worsening DENISHA precludes safe DDAVP use, cont transfusing  - CTAP with Moderate volume ascites. Layering hyperdensity within the ascites in the right paracolic gutter, suggestive of hemoperitoneum.  - s/p 2 units of pRBC on 3/14, 1 unit prbc 3/16, 3/17  - IR consulted and following the case  - wife wants conservative management at this time, no CTA, no embolization, no HD- is ok with ongoing transfusions as needed  - hold xarelto/aspirin

## 2025-03-18 NOTE — PROGRESS NOTE ADULT - SUBJECTIVE AND OBJECTIVE BOX
Indication for Geriatrics and Palliative Care Services/INTERVAL HPI: GOC  SUBJECTIVE AND OBJECTIVE: Pt seen at bedside with granddaughter present. Pt reports feeling tired.    OVERNIGHT EVENTS: No acute events reported.     DNR on chart:DNI: Trial NIV  DNI: Trial NIV    Allergies    No Known Allergies    Intolerances    MEDICATIONS  (STANDING):  allopurinol 100 milliGRAM(s) Oral daily  atorvastatin 20 milliGRAM(s) Oral at bedtime  calcium acetate 667 milliGRAM(s) Oral three times a day with meals  chlorhexidine 2% Cloths 1 Application(s) Topical daily  dextrose 5%. 1000 milliLiter(s) (50 mL/Hr) IV Continuous <Continuous>  dextrose 5%. 1000 milliLiter(s) (100 mL/Hr) IV Continuous <Continuous>  dextrose 50% Injectable 25 Gram(s) IV Push once  dextrose 50% Injectable 12.5 Gram(s) IV Push once  dextrose 50% Injectable 25 Gram(s) IV Push once  epoetin lashawn (EPOGEN) Injectable 14431 Unit(s) SubCutaneous every 7 days  fluticasone propionate/ salmeterol 250-50 MICROgram(s) Diskus 1 Dose(s) Inhalation two times a day  folic acid 1 milliGRAM(s) Oral daily  glucagon  Injectable 1 milliGRAM(s) IntraMuscular once  hydrALAZINE 25 milliGRAM(s) Oral three times a day  insulin glargine Injectable (LANTUS) 18 Unit(s) SubCutaneous at bedtime  insulin lispro (ADMELOG) corrective regimen sliding scale   SubCutaneous three times a day before meals  insulin lispro Injectable (ADMELOG) 3 Unit(s) SubCutaneous three times a day before meals  isosorbide   dinitrate Tablet (ISORDIL) 10 milliGRAM(s) Oral three times a day  meropenem  IVPB      meropenem  IVPB 500 milliGRAM(s) IV Intermittent every 12 hours  metoprolol succinate ER 50 milliGRAM(s) Oral daily  pantoprazole    Tablet 40 milliGRAM(s) Oral every 12 hours  polyethylene glycol 3350 17 Gram(s) Oral two times a day  psyllium Powder 1 Packet(s) Oral daily  sodium chloride 0.65% Nasal 1 Spray(s) Both Nostrils two times a day  tamsulosin 0.4 milliGRAM(s) Oral at bedtime  tiotropium 2.5 MICROgram(s) Inhaler 2 Puff(s) Inhalation daily    MEDICATIONS  (PRN):  acetaminophen     Tablet .. 650 milliGRAM(s) Oral every 6 hours PRN Temp greater or equal to 38C (100.4F), Mild Pain (1 - 3)  albuterol/ipratropium for Nebulization 3 milliLiter(s) Nebulizer every 6 hours PRN Shortness of Breath and/or Wheezing  bisacodyl 5 milliGRAM(s) Oral every 12 hours PRN Constipation  dextrose Oral Gel 15 Gram(s) Oral once PRN Blood Glucose LESS THAN 70 milliGRAM(s)/deciliter  dextrose Oral Gel 15 Gram(s) Oral once PRN Blood Glucose LESS THAN 70 milliGRAM(s)/deciliter  melatonin 3 milliGRAM(s) Oral at bedtime PRN Insomnia      ITEMS UNCHECKED ARE NOT PRESENT    PRESENT SYMPTOMS: [ ]Unable to self-report - see [ ] CPOT [ ] PAINADS [ ] RDOS  Source if other than patient:  [ ]Family   [ ]Team     Pain:  [ ]yes [ x]no  QOL impact -   Location -                    Aggravating factors -  Quality -  Radiation -  Timing-  Severity (0-10 scale):  Minimal acceptable level (0-10 scale):     CPOT:    https://www.[email or web address]/getattachment/[email or web address]/Critical-Care-Pain-Observation-Tool-(CPOT)    Dyspnea:           No                [ ]Mild [ ]Moderate [ ]Severe  Anxiety:                             [ ]Mild [ ]Moderate [ ]Severe  Fatigue:                             [ ]Mild [ ]Moderate [ ]Severe  Nausea:                             [ ]Mild [ ]Moderate [ ]Severe  Loss of appetite:              [ ]Mild [ ]Moderate [ ]Severe  Constipation:                    [ ]Mild [ ]Moderate [ ]Severe    PCSSQ[Palliative Care Spiritual Screening Question]   Severity (0-10):  Score of 4 or > indicate consideration of Chaplaincy referral.  Chaplaincy Referral: [x ] yes [ ] refused [x ] following [ ] Deferred   Seen on 2/20 "Saw pt on rounds.  Pt was confused and unintelligible.  Pt said he had a lot on his mind but didn't want to talk about it.  Chap sat and provided supportive presence."    Caregiver [facility]? : [x ] yes [ ] no [ ] Deferred [ ] Declined             Social work referral [ x] Patient & Family Centered Care Referral [ ]     Anticipatory Grief present?:  [ ] yes [ ] no  [x ] Deferred                  Social work referral [ ] Chaplaincy Referral[ ]      Other Symptoms:  [x]All other review of systems negative   [ ]Unable to obtain due to poor mentation    Palliative Performance Status Version 2:     30    %      [email or web address]  PHYSICAL EXAM:  Vital Signs Last 24 Hrs  T(C): 36.6 (18 Mar 2025 11:42), Max: 37.2 (18 Mar 2025 04:00)  T(F): 97.8 (18 Mar 2025 11:42), Max: 98.9 (18 Mar 2025 04:00)  HR: 73 (18 Mar 2025 11:42) (68 - 73)  BP: 129/73 (18 Mar 2025 11:42) (114/71 - 135/69)  BP(mean): --  RR: 18 (18 Mar 2025 11:42) (18 - 18)  SpO2: 99% (18 Mar 2025 11:42) (96% - 99%)    Parameters below as of 18 Mar 2025 11:42  Patient On (Oxygen Delivery Method): nasal cannula  O2 Flow (L/min): 4   I&O's Summary    17 Mar 2025 07:01  -  18 Mar 2025 07:00  --------------------------------------------------------  IN: 570 mL / OUT: 750 mL / NET: -180 mL    18 Mar 2025 07:01  -  18 Mar 2025 14:33  --------------------------------------------------------  IN: 180 mL / OUT: 0 mL / NET: 180 mL       GENERAL:  [x]Alert  [x]Oriented x 3  [ ]Lethargic  [ ]Cachexia  [ ]Unarousable  [x]Verbal  [ ]Non-Verbal  Behavioral:   [ ]Anxiety  [ ]Delirium [ ]Agitation [ ]Other  HEENT:  [x]Normal   [ ]Dry mouth   [ ]ET Tube/Trach  [ ]Oral lesions  PULMONARY:   [x]Clear [ ]Tachypnea  [ ]Audible excessive secretions   [ ]Rhonchi        [ ]Right [ ]Left [ ]Bilateral  [ ]Crackles        [ ]Right [ ]Left [ ]Bilateral  [ ]Wheezing     [ ]Right [ ]Left [ ]Bilateral  [ ]Diminished BS [ ] Right [ ]Left [ ]Bilateral  CARDIOVASCULAR:    [x]Regular [ ]Irregular [ ]Tachy  [ ]Oscar [ ]Murmur [ ]Other  GASTROINTESTINAL:  [x]Soft  [ ]Distended   [x]+BS  [x]Non tender [ ]Tender  [ ]PEG [ ]OGT/ NGT   Last BM:    GENITOURINARY:  [x]Normal [ ]Incontinent   [ ]Oliguria/Anuria   [ ]Bucio  MUSCULOSKELETAL:   [ ]Normal   [x]Weakness  [ ]Bed/Wheelchair bound [ ]Edema  NEUROLOGIC:   [x]No focal deficits  [ ] Cognitive impairment  [ ] Dysphagia [ ]Dysarthria [ ] Paresis [ ]Other   SKIN:   [x]Normal  [ ]Rash   [ ]Pressure ulcer(s) [ ]y [ ]n present on admission      CRITICAL CARE:  [ ]Shock Present  [ ]Septic [ ]Cardiogenic [ ]Neurologic [ ]Hypovolemic  [ ]Vasopressors [ ]Inotropes  [ ]Respiratory failure present [ ]Mechanical Ventilation [ ]Non-invasive ventilatory support [ ]High-Flow   [ ]Acute  [ ]Chronic [ ]Hypoxic  [ ]Hypercarbic [ ]Other  [ ]Other organ failure     LABS:                        9.0    14.74 )-----------( 143      ( 18 Mar 2025 06:50 )             28.5   03-18    130[L]  |  84[L]  |  106[H]  ----------------------------<  181[H]  4.4   |  30  |  5.22[H]    Ca    8.4      18 Mar 2025 06:50  Phos  6.8     03-18  Mg     2.7     03-18    TPro  6.3  /  Alb  3.2[L]  /  TBili  0.7  /  DBili  x   /  AST  19  /  ALT  10  /  AlkPhos  152[H]  03-17      Urinalysis Basic - ( 18 Mar 2025 06:50 )    Color: x / Appearance: x / SG: x / pH: x  Gluc: 181 mg/dL / Ketone: x  / Bili: x / Urobili: x   Blood: x / Protein: x / Nitrite: x   Leuk Esterase: x / RBC: x / WBC x   Sq Epi: x / Non Sq Epi: x / Bacteria: x      RADIOLOGY & ADDITIONAL STUDIES:  < from: CT Abdomen and Pelvis No Cont (03.15.25 @ 09:13) >    IMPRESSION:  Moderate volume ascites. Layering hyperdensity within the ascites in the   right paracolic gutter, suggestive of hemoperitoneum.  No retroperitoneal hemorrhage.    Findings were discussed with Dr. LOPEZ DWYER [phone] 3/15/2025 9:43   AM by Dr. Min with read back confirmation.    --- End of Report ---           MARCO MIN MD; Resident Radiologist  This document has been electronically signed.  RAJIV AGUILERA MD; Attending Radiologist  This document has been electronically signed. Mar 15 2025  9:56AM    < end of copied text >      Protein Calorie Malnutrition Present: [ ]mild [ ]moderate [ ]severe [ ]underweight [ ]morbid obesity  https://www.[employer].org/vault/2440/web/files/ONC/Table_Clinical%20Characteristics%20to%20Document%20Malnutrition-White%20JV%20et%20al%392626.pdf    Height (cm): 177.8 (01-10-25 @ 15:59), 180.3 (08-07-24 @ 17:24)  Weight (kg): 107.501 (02-08-25 @ 09:08), 110.9 (01-18-25 @ 10:27), 108.9 (08-07-24 @ 17:24)  BMI (kg/m2): 34 (02-08-25 @ 09:08), 35.1 (01-18-25 @ 10:27), 34.4 (01-10-25 @ 15:59)    [ ]PPSV2 < or = 30%  [ ]significant weight loss [ ]poor nutritional intake [ ]anasarca[ ]Artificial Nutrition    Other REFERRALS:  [ ]Hospice  [ ]Child Life  [ ]Social Work  [ ]Case management [ ]Holistic Therapy     Goals of Care Document:

## 2025-03-18 NOTE — PROGRESS NOTE ADULT - SUBJECTIVE AND OBJECTIVE BOX
Contreras Brooks MD  Division of Hospital Medicine  Available on MS teams until 7pm  If no response or off-hours, page [phone]  -------------------------------------    Patient is a 79y old  Male who presents with a chief complaint of HF eval (17 Mar 2025 17:36)      SUBJECTIVE / OVERNIGHT EVENTS: none acute  ADDITIONAL REVIEW OF SYSTEMS: pt slightly short of breath but states feeling ok, no aches/pains, no fevers/chills. Defers his interview to his wife at bedside.     MEDICATIONS  (STANDING):  allopurinol 100 milliGRAM(s) Oral daily  atorvastatin 20 milliGRAM(s) Oral at bedtime  calcium acetate 667 milliGRAM(s) Oral three times a day with meals  chlorhexidine 2% Cloths 1 Application(s) Topical daily  dextrose 5%. 1000 milliLiter(s) (50 mL/Hr) IV Continuous <Continuous>  dextrose 5%. 1000 milliLiter(s) (100 mL/Hr) IV Continuous <Continuous>  dextrose 50% Injectable 25 Gram(s) IV Push once  dextrose 50% Injectable 12.5 Gram(s) IV Push once  dextrose 50% Injectable 25 Gram(s) IV Push once  epoetin lashawn (EPOGEN) Injectable 57876 Unit(s) SubCutaneous every 7 days  fluticasone propionate/ salmeterol 250-50 MICROgram(s) Diskus 1 Dose(s) Inhalation two times a day  folic acid 1 milliGRAM(s) Oral daily  glucagon  Injectable 1 milliGRAM(s) IntraMuscular once  hydrALAZINE 25 milliGRAM(s) Oral three times a day  insulin glargine Injectable (LANTUS) 18 Unit(s) SubCutaneous at bedtime  insulin lispro (ADMELOG) corrective regimen sliding scale   SubCutaneous three times a day before meals  insulin lispro Injectable (ADMELOG) 3 Unit(s) SubCutaneous three times a day before meals  isosorbide   dinitrate Tablet (ISORDIL) 10 milliGRAM(s) Oral three times a day  meropenem  IVPB      meropenem  IVPB 500 milliGRAM(s) IV Intermittent every 12 hours  metoprolol succinate ER 50 milliGRAM(s) Oral daily  pantoprazole    Tablet 40 milliGRAM(s) Oral every 12 hours  polyethylene glycol 3350 17 Gram(s) Oral two times a day  psyllium Powder 1 Packet(s) Oral daily  sodium chloride 0.65% Nasal 1 Spray(s) Both Nostrils two times a day  tamsulosin 0.4 milliGRAM(s) Oral at bedtime  tiotropium 2.5 MICROgram(s) Inhaler 2 Puff(s) Inhalation daily    MEDICATIONS  (PRN):  acetaminophen     Tablet .. 650 milliGRAM(s) Oral every 6 hours PRN Temp greater or equal to 38C (100.4F), Mild Pain (1 - 3)  albuterol/ipratropium for Nebulization 3 milliLiter(s) Nebulizer every 6 hours PRN Shortness of Breath and/or Wheezing  bisacodyl 5 milliGRAM(s) Oral every 12 hours PRN Constipation  dextrose Oral Gel 15 Gram(s) Oral once PRN Blood Glucose LESS THAN 70 milliGRAM(s)/deciliter  dextrose Oral Gel 15 Gram(s) Oral once PRN Blood Glucose LESS THAN 70 milliGRAM(s)/deciliter  melatonin 3 milliGRAM(s) Oral at bedtime PRN Insomnia      CAPILLARY BLOOD GLUCOSE      POCT Blood Glucose.: 175 mg/dL (18 Mar 2025 12:30)  POCT Blood Glucose.: 179 mg/dL (18 Mar 2025 08:33)  POCT Blood Glucose.: 136 mg/dL (17 Mar 2025 21:12)  POCT Blood Glucose.: 138 mg/dL (17 Mar 2025 17:04)    I&O's Summary    17 Mar 2025 07:01  -  18 Mar 2025 07:00  --------------------------------------------------------  IN: 570 mL / OUT: 750 mL / NET: -180 mL    18 Mar 2025 07:01  -  18 Mar 2025 14:39  --------------------------------------------------------  IN: 300 mL / OUT: 400 mL / NET: -100 mL        PHYSICAL EXAM:  Vital Signs Last 24 Hrs  T(C): 36.6 (18 Mar 2025 11:42), Max: 37.2 (18 Mar 2025 04:00)  T(F): 97.8 (18 Mar 2025 11:42), Max: 98.9 (18 Mar 2025 04:00)  HR: 73 (18 Mar 2025 11:42) (68 - 73)  BP: 129/73 (18 Mar 2025 11:42) (114/71 - 135/69)  BP(mean): --  RR: 18 (18 Mar 2025 11:42) (18 - 18)  SpO2: 99% (18 Mar 2025 11:42) (96% - 99%)    Parameters below as of 18 Mar 2025 11:42  Patient On (Oxygen Delivery Method): nasal cannula  O2 Flow (L/min): 4    CONSTITUTIONAL: NAD  EYES: PERRLA; conjunctiva and sclera clear  ENMT: MMM  NECK: Supple  RESPIRATORY: Normal respiratory effort; dec breath sounds  CARDIOVASCULAR: RRR, no JVD, no peripheral edema   ABDOMEN: distended, nontender  MUSCLOSKELETAL:  no clubbing/cyanosis, no joint swelling or tenderness to palpation  PSYCH: A+O x 2-3, affect normal  NEUROLOGY: CN 2-12 are intact and symmetric; no gross sensory or motor deficits  SKIN: No rashes; no palpable lesions    LABS:                        9.0    14.74 )-----------( 143      ( 18 Mar 2025 06:50 )             28.5     03-18    130[L]  |  84[L]  |  106[H]  ----------------------------<  181[H]  4.4   |  30  |  5.22[H]    Ca    8.4      18 Mar 2025 06:50  Phos  6.8     03-18  Mg     2.7     03-18    TPro  6.3  /  Alb  3.2[L]  /  TBili  0.7  /  DBili  x   /  AST  19  /  ALT  10  /  AlkPhos  152[H]  03-17          Urinalysis Basic - ( 18 Mar 2025 06:50 )    Color: x / Appearance: x / SG: x / pH: x  Gluc: 181 mg/dL / Ketone: x  / Bili: x / Urobili: x   Blood: x / Protein: x / Nitrite: x   Leuk Esterase: x / RBC: x / WBC x   Sq Epi: x / Non Sq Epi: x / Bacteria: x          RADIOLOGY & ADDITIONAL TESTS:  Results Reviewed:   Imaging Personally Reviewed:  Electrocardiogram Personally Reviewed:    COORDINATION OF CARE:  Care Discussed with Consultants/Other Providers [Y/N]: palliative  Prior or Outpatient Records Reviewed [Y/N]:

## 2025-03-18 NOTE — PROGRESS NOTE ADULT - ASSESSMENT
78 y/o male w/ PMHx CAD s/p CABG, HF (combined HFrEF [EF 25%] and HFpEF [G3DD]) w/ ICD, AFib on Xarelto, severe AS s/p TAVR, COPD on 3-4L home O2, CKD4, HTN, HLD, T2DM, and BPH who presents as a transfer from [facility] for HF evaluation. Ongoing CHF exacerbation now off bumex gtt, new DENISHA on CKD likely 2/2 ongoing cardiorenal and possible component of abdominal compartment syndrome given tremendous ascites. Patient with prolonged hospitalization and guarded prognosis. Palliative team reconsulted for assistance with GOC.

## 2025-03-18 NOTE — PROGRESS NOTE ADULT - PROBLEM SELECTOR PLAN 3
- Likely DENISHA in setting of cardiorenal syndrome  - Continue monitoring Cr and urine output. Nephrology following, appreciate recs.    #Leukocytosis- rising white count over the past couple days; also with abd distension and pain. Ddx includes severe constipation given suboptimal BM vs. less likely SBP from cardiac ascites vs. possible UTI  - UA positive, started renally dosed meropenem, f/u ucx  - pt with large bm x 2, cont aggressive bowel regimen  - f/u dx/tx para given large ascites on imaging, neg for SBP  - cont trending cbc

## 2025-03-18 NOTE — PROGRESS NOTE ADULT - SUBJECTIVE AND OBJECTIVE BOX
Comfortable on NC O2, denies SOB    Vital Signs Last 24 Hrs  T(C): 36.6 (03-18-25 @ 11:42), Max: 37.2 (03-18-25 @ 04:00)  T(F): 97.8 (03-18-25 @ 11:42), Max: 98.9 (03-18-25 @ 04:00)  HR: 73 (03-18-25 @ 11:42) (68 - 73)  BP: 129/73 (03-18-25 @ 11:42) (117/65 - 135/69)  RR: 18 (03-18-25 @ 11:42) (18 - 18)  SpO2: 99% (03-18-25 @ 11:42) (96% - 99%)    I&O's Detail    17 Mar 2025 07:01  -  18 Mar 2025 07:00  --------------------------------------------------------  IN:    Oral Fluid: 570 mL  Total IN: 570 mL    OUT:    Indwelling Catheter - Urethral (mL): 750 mL  Total OUT: 750 mL    18 Mar 2025 07:01  -  18 Mar 2025 15:52  --------------------------------------------------------  IN:    Oral Fluid: 300 mL  Total IN: 300 mL    OUT:    Indwelling Catheter - Urethral (mL): 400 mL  Total OUT: 400 mL    s1s2  b/l air entry  soft, ascites   edema, chronic, unchanged                                                                                                                               9.0    14.74 )-----------( 143      ( 18 Mar 2025 06:50 )             28.5     18 Mar 2025 06:50    130    |  84     |  106    ----------------------------<  181    4.4     |  30     |  5.22     Ca    8.4        18 Mar 2025 06:50  Phos  6.8       18 Mar 2025 06:50  Mg     2.7       18 Mar 2025 06:50    TPro  6.3    /  Alb  3.2    /  TBili  0.7    /  DBili  x      /  AST  19     /  ALT  10     /  AlkPhos  152    17 Mar 2025 06:38    LIVER FUNCTIONS - ( 17 Mar 2025 06:38 )  Alb: 3.2 g/dL / Pro: 6.3 g/dL / ALK PHOS: 152 U/L / ALT: 10 U/L / AST: 19 U/L / GGT: x           acetaminophen     Tablet .. 650 milliGRAM(s) Oral every 6 hours PRN  albuterol/ipratropium for Nebulization 3 milliLiter(s) Nebulizer every 6 hours PRN  allopurinol 100 milliGRAM(s) Oral daily  atorvastatin 20 milliGRAM(s) Oral at bedtime  bisacodyl 5 milliGRAM(s) Oral every 12 hours PRN  calcium acetate 667 milliGRAM(s) Oral three times a day with meals  chlorhexidine 2% Cloths 1 Application(s) Topical daily  dextrose 5%. 1000 milliLiter(s) IV Continuous <Continuous>  dextrose 5%. 1000 milliLiter(s) IV Continuous <Continuous>  dextrose 50% Injectable 25 Gram(s) IV Push once  dextrose 50% Injectable 12.5 Gram(s) IV Push once  dextrose 50% Injectable 25 Gram(s) IV Push once  dextrose Oral Gel 15 Gram(s) Oral once PRN  dextrose Oral Gel 15 Gram(s) Oral once PRN  epoetin lashawn (EPOGEN) Injectable 52861 Unit(s) SubCutaneous every 7 days  fluticasone propionate/ salmeterol 250-50 MICROgram(s) Diskus 1 Dose(s) Inhalation two times a day  folic acid 1 milliGRAM(s) Oral daily  glucagon  Injectable 1 milliGRAM(s) IntraMuscular once  hydrALAZINE 25 milliGRAM(s) Oral three times a day  insulin glargine Injectable (LANTUS) 18 Unit(s) SubCutaneous at bedtime  insulin lispro (ADMELOG) corrective regimen sliding scale   SubCutaneous three times a day before meals  insulin lispro Injectable (ADMELOG) 3 Unit(s) SubCutaneous three times a day before meals  isosorbide   dinitrate Tablet (ISORDIL) 10 milliGRAM(s) Oral three times a day  melatonin 3 milliGRAM(s) Oral at bedtime PRN  meropenem  IVPB 500 milliGRAM(s) IV Intermittent every 12 hours  metoprolol succinate ER 50 milliGRAM(s) Oral daily  ondansetron Injectable 4 milliGRAM(s) IV Push every 6 hours PRN  pantoprazole    Tablet 40 milliGRAM(s) Oral every 12 hours  polyethylene glycol 3350 17 Gram(s) Oral two times a day  psyllium Powder 1 Packet(s) Oral daily  sodium chloride 0.65% Nasal 1 Spray(s) Both Nostrils two times a day  tamsulosin 0.4 milliGRAM(s) Oral at bedtime  tiotropium 2.5 MICROgram(s) Inhaler 2 Puff(s) Inhalation daily    A/P:    COPD, on home O2  CM, EF ~ 30%, severe TR  Adm to [facility] VS 1/10/25 w/fluid overload   Tx to [facility] 2/5/25 for HF eval   Known CKD (baseline Cr ~ 2. - 2.5)  Hemodynamic/cardio-renal   S/p LVP 3/14 w/4.5L fluid removal  S/p SPA x 4   Dropped Hgb to 5.3 on 3/15, s/p PRBCs   Sharp rise in Cr iso above   F/u BMP, UO   D/w medicine   Per pt and wife known wishes, no dialysis  Conservative approach   Avoid nephrotoxins   Would hold diuretics unless worsening SOB  Palliative f/u appr    [phone]

## 2025-03-18 NOTE — PROGRESS NOTE ADULT - PROBLEM SELECTOR PLAN 1
end stage heart failure  heart failure recommendations appreciated  pt and family expressed wishes for conservative management

## 2025-03-19 LAB
ALBUMIN SERPL ELPH-MCNC: 3.2 G/DL — LOW (ref 3.3–5)
ALP SERPL-CCNC: 231 U/L — HIGH (ref 40–120)
ALT FLD-CCNC: 11 U/L — SIGNIFICANT CHANGE UP (ref 10–45)
ANION GAP SERPL CALC-SCNC: 18 MMOL/L — HIGH (ref 5–17)
AST SERPL-CCNC: 37 U/L — SIGNIFICANT CHANGE UP (ref 10–40)
BILIRUB SERPL-MCNC: 0.8 MG/DL — SIGNIFICANT CHANGE UP (ref 0.2–1.2)
BUN SERPL-MCNC: 104 MG/DL — HIGH (ref 7–23)
CALCIUM SERPL-MCNC: 8.7 MG/DL — SIGNIFICANT CHANGE UP (ref 8.4–10.5)
CHLORIDE SERPL-SCNC: 83 MMOL/L — LOW (ref 96–108)
CO2 SERPL-SCNC: 27 MMOL/L — SIGNIFICANT CHANGE UP (ref 22–31)
CREAT SERPL-MCNC: 5.54 MG/DL — HIGH (ref 0.5–1.3)
CULTURE RESULTS: SIGNIFICANT CHANGE UP
EGFR: 10 ML/MIN/1.73M2 — LOW
EGFR: 10 ML/MIN/1.73M2 — LOW
GLUCOSE BLDC GLUCOMTR-MCNC: 125 MG/DL — HIGH (ref 70–99)
GLUCOSE BLDC GLUCOMTR-MCNC: 125 MG/DL — HIGH (ref 70–99)
GLUCOSE BLDC GLUCOMTR-MCNC: 128 MG/DL — HIGH (ref 70–99)
GLUCOSE BLDC GLUCOMTR-MCNC: 129 MG/DL — HIGH (ref 70–99)
GLUCOSE BLDC GLUCOMTR-MCNC: 138 MG/DL — HIGH (ref 70–99)
GLUCOSE SERPL-MCNC: 105 MG/DL — HIGH (ref 70–99)
HCT VFR BLD CALC: 28.7 % — LOW (ref 39–50)
HGB BLD-MCNC: 8.9 G/DL — LOW (ref 13–17)
MCHC RBC-ENTMCNC: 27 PG — SIGNIFICANT CHANGE UP (ref 27–34)
MCHC RBC-ENTMCNC: 31 G/DL — LOW (ref 32–36)
MCV RBC AUTO: 87 FL — SIGNIFICANT CHANGE UP (ref 80–100)
NRBC BLD AUTO-RTO: 0 /100 WBCS — SIGNIFICANT CHANGE UP (ref 0–0)
PLATELET # BLD AUTO: 147 K/UL — LOW (ref 150–400)
POTASSIUM SERPL-MCNC: 4.6 MMOL/L — SIGNIFICANT CHANGE UP (ref 3.5–5.3)
POTASSIUM SERPL-SCNC: 4.6 MMOL/L — SIGNIFICANT CHANGE UP (ref 3.5–5.3)
PROT SERPL-MCNC: 6.9 G/DL — SIGNIFICANT CHANGE UP (ref 6–8.3)
RBC # BLD: 3.3 M/UL — LOW (ref 4.2–5.8)
RBC # FLD: 16.9 % — HIGH (ref 10.3–14.5)
SODIUM SERPL-SCNC: 128 MMOL/L — LOW (ref 135–145)
SPECIMEN SOURCE: SIGNIFICANT CHANGE UP
WBC # BLD: 16.89 K/UL — HIGH (ref 3.8–10.5)
WBC # FLD AUTO: 16.89 K/UL — HIGH (ref 3.8–10.5)

## 2025-03-19 PROCEDURE — 99233 SBSQ HOSP IP/OBS HIGH 50: CPT

## 2025-03-19 PROCEDURE — 99232 SBSQ HOSP IP/OBS MODERATE 35: CPT

## 2025-03-19 RX ORDER — BUMETANIDE 1 MG/1
4 TABLET ORAL ONCE
Refills: 0 | Status: DISCONTINUED | OUTPATIENT
Start: 2025-03-19 | End: 2025-03-19

## 2025-03-19 RX ORDER — FUROSEMIDE 10 MG/ML
80 INJECTION INTRAMUSCULAR; INTRAVENOUS THREE TIMES A DAY
Refills: 0 | Status: DISCONTINUED | OUTPATIENT
Start: 2025-03-19 | End: 2025-03-21

## 2025-03-19 RX ADMIN — TAMSULOSIN HYDROCHLORIDE 0.4 MILLIGRAM(S): 0.4 CAPSULE ORAL at 21:48

## 2025-03-19 RX ADMIN — Medication 25 MILLIGRAM(S): at 05:18

## 2025-03-19 RX ADMIN — INSULIN LISPRO 3 UNIT(S): 100 INJECTION, SOLUTION INTRAVENOUS; SUBCUTANEOUS at 17:27

## 2025-03-19 RX ADMIN — INSULIN LISPRO 0: 100 INJECTION, SOLUTION INTRAVENOUS; SUBCUTANEOUS at 17:27

## 2025-03-19 RX ADMIN — Medication 25 MILLIGRAM(S): at 21:48

## 2025-03-19 RX ADMIN — ATORVASTATIN CALCIUM 20 MILLIGRAM(S): 80 TABLET, FILM COATED ORAL at 21:48

## 2025-03-19 RX ADMIN — TIOTROPIUM BROMIDE INHALATION SPRAY 2 PUFF(S): 3.12 SPRAY, METERED RESPIRATORY (INHALATION) at 11:33

## 2025-03-19 RX ADMIN — Medication 1 APPLICATION(S): at 12:47

## 2025-03-19 RX ADMIN — Medication 1 SPRAY(S): at 17:26

## 2025-03-19 RX ADMIN — POLYETHYLENE GLYCOL 3350 17 GRAM(S): 17 POWDER, FOR SOLUTION ORAL at 05:18

## 2025-03-19 RX ADMIN — Medication 25 MILLIGRAM(S): at 17:25

## 2025-03-19 RX ADMIN — Medication 100 MILLIGRAM(S): at 11:33

## 2025-03-19 RX ADMIN — ISOSORBDIE DINITRATE 10 MILLIGRAM(S): 30 TABLET ORAL at 06:42

## 2025-03-19 RX ADMIN — Medication 40 MILLIGRAM(S): at 17:26

## 2025-03-19 RX ADMIN — METOPROLOL SUCCINATE 50 MILLIGRAM(S): 50 TABLET, EXTENDED RELEASE ORAL at 10:37

## 2025-03-19 RX ADMIN — Medication 1 SPRAY(S): at 05:18

## 2025-03-19 RX ADMIN — MEROPENEM 100 MILLIGRAM(S): 1 INJECTION INTRAVENOUS at 17:28

## 2025-03-19 RX ADMIN — ISOSORBDIE DINITRATE 10 MILLIGRAM(S): 30 TABLET ORAL at 17:25

## 2025-03-19 RX ADMIN — Medication 667 MILLIGRAM(S): at 11:32

## 2025-03-19 RX ADMIN — Medication 1 DOSE(S): at 17:26

## 2025-03-19 RX ADMIN — POLYETHYLENE GLYCOL 3350 17 GRAM(S): 17 POWDER, FOR SOLUTION ORAL at 17:26

## 2025-03-19 RX ADMIN — INSULIN GLARGINE-YFGN 18 UNIT(S): 100 INJECTION, SOLUTION SUBCUTANEOUS at 21:48

## 2025-03-19 RX ADMIN — ISOSORBDIE DINITRATE 10 MILLIGRAM(S): 30 TABLET ORAL at 11:32

## 2025-03-19 RX ADMIN — FUROSEMIDE 80 MILLIGRAM(S): 10 INJECTION INTRAMUSCULAR; INTRAVENOUS at 21:48

## 2025-03-19 RX ADMIN — FOLIC ACID 1 MILLIGRAM(S): 1 TABLET ORAL at 11:33

## 2025-03-19 RX ADMIN — Medication 40 MILLIGRAM(S): at 05:17

## 2025-03-19 RX ADMIN — Medication 1 DOSE(S): at 05:18

## 2025-03-19 RX ADMIN — FUROSEMIDE 80 MILLIGRAM(S): 10 INJECTION INTRAMUSCULAR; INTRAVENOUS at 15:09

## 2025-03-19 RX ADMIN — INSULIN LISPRO 3 UNIT(S): 100 INJECTION, SOLUTION INTRAVENOUS; SUBCUTANEOUS at 12:48

## 2025-03-19 RX ADMIN — Medication 667 MILLIGRAM(S): at 10:37

## 2025-03-19 RX ADMIN — MEROPENEM 100 MILLIGRAM(S): 1 INJECTION INTRAVENOUS at 05:20

## 2025-03-19 RX ADMIN — Medication 1 PACKET(S): at 11:32

## 2025-03-19 RX ADMIN — Medication 667 MILLIGRAM(S): at 17:24

## 2025-03-19 NOTE — PROGRESS NOTE ADULT - PROBLEM SELECTOR PLAN 3
- Likely DENISHA in setting of cardiorenal syndrome  - Continue monitoring Cr and urine output. Nephrology following, appreciate recs.    #Leukocytosis- rising white count over the past couple days; also with abd distension and pain. Ddx includes severe constipation given suboptimal BM vs. less likely SBP from cardiac ascites vs. possible UTI  - UA positive, started renally dosed meropenem, f/u ucx  - pt with large bm x 2, now constipated again; cont aggressive bowel regimen  - f/u dx/tx para given large ascites on imaging, neg for SBP  - cont trending cbc

## 2025-03-19 NOTE — PROGRESS NOTE ADULT - SUBJECTIVE AND OBJECTIVE BOX
Comfortable on NC O2, denies increased SOB    Vital Signs Last 24 Hrs  T(C): 36.6 (03-19-25 @ 20:00), Max: 36.6 (03-19-25 @ 11:22)  T(F): 97.8 (03-19-25 @ 20:00), Max: 97.9 (03-19-25 @ 11:22)  HR: 65 (03-19-25 @ 20:00) (65 - 75)  BP: 111/58 (03-19-25 @ 20:00) (111/58 - 130/73)  RR: 18 (03-19-25 @ 20:00) (18 - 18)  SpO2: 98% (03-19-25 @ 20:00) (96% - 98%)    I&O's Detail    18 Mar 2025 07:01  -  19 Mar 2025 07:00  --------------------------------------------------------  IN:    Oral Fluid: 300 mL  Total IN: 300 mL    OUT:    Indwelling Catheter - Urethral (mL): 500 mL  Total OUT: 500 mL    19 Mar 2025 07:01  -  19 Mar 2025 23:15  --------------------------------------------------------  IN:    Oral Fluid: 340 mL  Total IN: 340 mL    OUT:    Indwelling Catheter - Urethral (mL): 350 mL  Total OUT: 350 mL    s1s2  b/l air entry  soft, ascites   edema, chronic, unchanged                                                                                                                                       8.9    16.89 )-----------( 147      ( 19 Mar 2025 07:10 )             28.7     19 Mar 2025 07:10    128    |  83     |  104    ----------------------------<  105    4.6     |  27     |  5.54     Ca    8.7        19 Mar 2025 07:10  Phos  6.8       18 Mar 2025 06:50  Mg     2.7       18 Mar 2025 06:50    TPro  6.9    /  Alb  3.2    /  TBili  0.8    /  DBili  x      /  AST  37     /  ALT  11     /  AlkPhos  231    19 Mar 2025 07:10    LIVER FUNCTIONS - ( 19 Mar 2025 07:10 )  Alb: 3.2 g/dL / Pro: 6.9 g/dL / ALK PHOS: 231 U/L / ALT: 11 U/L / AST: 37 U/L / GGT: x           acetaminophen     Tablet .. 650 milliGRAM(s) Oral every 6 hours PRN  albuterol/ipratropium for Nebulization 3 milliLiter(s) Nebulizer every 6 hours PRN  allopurinol 100 milliGRAM(s) Oral daily  atorvastatin 20 milliGRAM(s) Oral at bedtime  bisacodyl 5 milliGRAM(s) Oral every 12 hours PRN  calcium acetate 667 milliGRAM(s) Oral three times a day with meals  chlorhexidine 2% Cloths 1 Application(s) Topical daily  dextrose 5%. 1000 milliLiter(s) IV Continuous <Continuous>  dextrose 5%. 1000 milliLiter(s) IV Continuous <Continuous>  dextrose 50% Injectable 25 Gram(s) IV Push once  dextrose 50% Injectable 12.5 Gram(s) IV Push once  dextrose 50% Injectable 25 Gram(s) IV Push once  dextrose Oral Gel 15 Gram(s) Oral once PRN  dextrose Oral Gel 15 Gram(s) Oral once PRN  epoetin lashawn (EPOGEN) Injectable 76130 Unit(s) SubCutaneous every 7 days  fluticasone propionate/ salmeterol 250-50 MICROgram(s) Diskus 1 Dose(s) Inhalation two times a day  folic acid 1 milliGRAM(s) Oral daily  furosemide    Tablet 80 milliGRAM(s) Oral three times a day  glucagon  Injectable 1 milliGRAM(s) IntraMuscular once  hydrALAZINE 25 milliGRAM(s) Oral three times a day  insulin glargine Injectable (LANTUS) 18 Unit(s) SubCutaneous at bedtime  insulin lispro (ADMELOG) corrective regimen sliding scale   SubCutaneous three times a day before meals  insulin lispro Injectable (ADMELOG) 3 Unit(s) SubCutaneous three times a day before meals  isosorbide   dinitrate Tablet (ISORDIL) 10 milliGRAM(s) Oral three times a day  melatonin 3 milliGRAM(s) Oral at bedtime PRN  meropenem  IVPB 500 milliGRAM(s) IV Intermittent every 12 hours  metoprolol succinate ER 50 milliGRAM(s) Oral daily  ondansetron Injectable 4 milliGRAM(s) IV Push every 6 hours PRN  pantoprazole    Tablet 40 milliGRAM(s) Oral every 12 hours  polyethylene glycol 3350 17 Gram(s) Oral two times a day  psyllium Powder 1 Packet(s) Oral daily  sodium chloride 0.65% Nasal 1 Spray(s) Both Nostrils two times a day  tamsulosin 0.4 milliGRAM(s) Oral at bedtime  tiotropium 2.5 MICROgram(s) Inhaler 2 Puff(s) Inhalation daily    A/P:    COPD, on home O2  CM, EF ~ 30%, severe TR  Adm to [facility] VS 1/10/25 w/fluid overload   Tx to [facility] 2/5/25 for HF eval   Known CKD (baseline Cr ~ 2. - 2.5)  Hemodynamic/cardio-renal   S/p LVP 3/14 w/4.5L fluid removal, s/p SPA x 4   Dropped Hgb to 5.3 on 3/15, s/p PRBCs   Sharp rise in Cr iso above   F/u BMP, UO   D/w pt and family again today, the decision is still for pt not to be on HD   Conservative approach   Avoid nephrotoxins   Trial of diuretics   Palliative following     [phone]

## 2025-03-19 NOTE — PROGRESS NOTE ADULT - ASSESSMENT
80 y/o male w/ PMHx CAD s/p CABG, HF (combined HFrEF [EF 25%] and HFpEF [G3DD]) w/ ICD, AFib on Xarelto, severe AS s/p TAVR, COPD on 3-4L home O2, CKD4, HTN, HLD, T2DM, and BPH who presents as a transfer from [facility] for HF evaluation. Ongoing CHF exacerbation now off bumex gtt, new DENISHA on CKD likely 2/2 ongoing cardiorenal and possible component of abdominal compartment syndrome given tremendous ascites

## 2025-03-19 NOTE — PROGRESS NOTE ADULT - SUBJECTIVE AND OBJECTIVE BOX
Contreras Brooks MD  Division of Hospital Medicine  Available on MS teams until 7pm  If no response or off-hours, page [phone]  -------------------------------------    Patient is a 79y old  Male who presents with a chief complaint of HF eval (19 Mar 2025 10:15)    SUBJECTIVE / OVERNIGHT EVENTS: none acute  ADDITIONAL REVIEW OF SYSTEMS: pt notes ongoing abd discomfort, some mild sob, otherwise no new complaints. Still does not want HD, also states he wants to go home but he and wife would like to give more time to see if kidneys stabilize.     MEDICATIONS  (STANDING):  allopurinol 100 milliGRAM(s) Oral daily  atorvastatin 20 milliGRAM(s) Oral at bedtime  calcium acetate 667 milliGRAM(s) Oral three times a day with meals  chlorhexidine 2% Cloths 1 Application(s) Topical daily  dextrose 5%. 1000 milliLiter(s) (50 mL/Hr) IV Continuous <Continuous>  dextrose 5%. 1000 milliLiter(s) (100 mL/Hr) IV Continuous <Continuous>  dextrose 50% Injectable 25 Gram(s) IV Push once  dextrose 50% Injectable 12.5 Gram(s) IV Push once  dextrose 50% Injectable 25 Gram(s) IV Push once  epoetin lashawn (EPOGEN) Injectable 85025 Unit(s) SubCutaneous every 7 days  fluticasone propionate/ salmeterol 250-50 MICROgram(s) Diskus 1 Dose(s) Inhalation two times a day  folic acid 1 milliGRAM(s) Oral daily  glucagon  Injectable 1 milliGRAM(s) IntraMuscular once  hydrALAZINE 25 milliGRAM(s) Oral three times a day  insulin glargine Injectable (LANTUS) 18 Unit(s) SubCutaneous at bedtime  insulin lispro (ADMELOG) corrective regimen sliding scale   SubCutaneous three times a day before meals  insulin lispro Injectable (ADMELOG) 3 Unit(s) SubCutaneous three times a day before meals  isosorbide   dinitrate Tablet (ISORDIL) 10 milliGRAM(s) Oral three times a day  meropenem  IVPB 500 milliGRAM(s) IV Intermittent every 12 hours  metoprolol succinate ER 50 milliGRAM(s) Oral daily  pantoprazole    Tablet 40 milliGRAM(s) Oral every 12 hours  polyethylene glycol 3350 17 Gram(s) Oral two times a day  psyllium Powder 1 Packet(s) Oral daily  sodium chloride 0.65% Nasal 1 Spray(s) Both Nostrils two times a day  tamsulosin 0.4 milliGRAM(s) Oral at bedtime  tiotropium 2.5 MICROgram(s) Inhaler 2 Puff(s) Inhalation daily    MEDICATIONS  (PRN):  acetaminophen     Tablet .. 650 milliGRAM(s) Oral every 6 hours PRN Temp greater or equal to 38C (100.4F), Mild Pain (1 - 3)  albuterol/ipratropium for Nebulization 3 milliLiter(s) Nebulizer every 6 hours PRN Shortness of Breath and/or Wheezing  bisacodyl 5 milliGRAM(s) Oral every 12 hours PRN Constipation  dextrose Oral Gel 15 Gram(s) Oral once PRN Blood Glucose LESS THAN 70 milliGRAM(s)/deciliter  dextrose Oral Gel 15 Gram(s) Oral once PRN Blood Glucose LESS THAN 70 milliGRAM(s)/deciliter  melatonin 3 milliGRAM(s) Oral at bedtime PRN Insomnia  ondansetron Injectable 4 milliGRAM(s) IV Push every 6 hours PRN Nausea and/or Vomiting      CAPILLARY BLOOD GLUCOSE      POCT Blood Glucose.: 129 mg/dL (19 Mar 2025 12:29)  POCT Blood Glucose.: 125 mg/dL (19 Mar 2025 08:36)  POCT Blood Glucose.: 128 mg/dL (19 Mar 2025 06:04)  POCT Blood Glucose.: 115 mg/dL (18 Mar 2025 23:53)  POCT Blood Glucose.: 103 mg/dL (18 Mar 2025 21:23)  POCT Blood Glucose.: 125 mg/dL (18 Mar 2025 17:12)    I&O's Summary    18 Mar 2025 07:01  -  19 Mar 2025 07:00  --------------------------------------------------------  IN: 300 mL / OUT: 500 mL / NET: -200 mL    19 Mar 2025 07:01  -  19 Mar 2025 13:01  --------------------------------------------------------  IN: 120 mL / OUT: 0 mL / NET: 120 mL        PHYSICAL EXAM:  Vital Signs Last 24 Hrs  T(C): 36.6 (19 Mar 2025 11:22), Max: 36.9 (18 Mar 2025 20:13)  T(F): 97.9 (19 Mar 2025 11:22), Max: 98.4 (18 Mar 2025 20:13)  HR: 69 (19 Mar 2025 11:22) (69 - 75)  BP: 130/73 (19 Mar 2025 11:22) (111/70 - 135/69)  BP(mean): --  RR: 18 (19 Mar 2025 11:22) (18 - 18)  SpO2: 96% (19 Mar 2025 11:22) (96% - 98%)    Parameters below as of 19 Mar 2025 11:22  Patient On (Oxygen Delivery Method): nasal cannula  O2 Flow (L/min): 4    CONSTITUTIONAL: NAD  EYES: PERRLA; conjunctiva and sclera clear  ENMT: MMM  NECK: Supple  RESPIRATORY: Normal respiratory effort; dec breath sounds b/l  CARDIOVASCULAR: RRR, no JVD, anasarcic  ABDOMEN: distended  MUSCLOSKELETAL:  no clubbing/cyanosis, no joint swelling or tenderness to palpation  PSYCH: A+O x 3, affect normal  NEUROLOGY: CN 2-12 are intact and symmetric; no gross sensory or motor deficits  SKIN: No rashes; no palpable lesions    LABS:                        8.9    16.89 )-----------( 147      ( 19 Mar 2025 07:10 )             28.7     03-19    128[L]  |  83[L]  |  104[H]  ----------------------------<  105[H]  4.6   |  27  |  5.54[H]    Ca    8.7      19 Mar 2025 07:10  Phos  6.8     03-18  Mg     2.7     03-18    TPro  6.9  /  Alb  3.2[L]  /  TBili  0.8  /  DBili  x   /  AST  37  /  ALT  11  /  AlkPhos  231[H]  03-19          Urinalysis Basic - ( 19 Mar 2025 07:10 )    Color: x / Appearance: x / SG: x / pH: x  Gluc: 105 mg/dL / Ketone: x  / Bili: x / Urobili: x   Blood: x / Protein: x / Nitrite: x   Leuk Esterase: x / RBC: x / WBC x   Sq Epi: x / Non Sq Epi: x / Bacteria: x          RADIOLOGY & ADDITIONAL TESTS:  Results Reviewed:   Imaging Personally Reviewed:  Electrocardiogram Personally Reviewed:    COORDINATION OF CARE:  Care Discussed with Consultants/Other Providers [Y/N]: renal, palliative  Prior or Outpatient Records Reviewed [Y/N]:

## 2025-03-19 NOTE — PROGRESS NOTE ADULT - PROBLEM SELECTOR PLAN 1
Sudden drop in Hgb from 7.5 to 5.5 on 3/15 s/p para 3/14. Now stable hb around 9  - worsening DENISHA precludes safe DDAVP use, cont transfusing  - CTAP with Moderate volume ascites. Layering hyperdensity within the ascites in the right paracolic gutter, suggestive of hemoperitoneum.  - s/p 2 units of pRBC on 3/14, 1 unit prbc 3/16, 3/17  - IR consulted and following the case  - wife wants conservative management at this time, no CTA, no embolization, no HD- is ok with ongoing transfusions as needed  - hold xarelto/aspirin

## 2025-03-19 NOTE — PROGRESS NOTE ADULT - PROBLEM SELECTOR PLAN 1
end stage heart failure  heart failure recommendations appreciated  pt and family expressed wishes to continue conservative management

## 2025-03-19 NOTE — PROGRESS NOTE ADULT - ASSESSMENT
80 y/o male w/ PMHx CAD s/p CABG, HF (combined HFrEF [EF 25%] and HFpEF [G3DD]) w/ ICD, AFib on Xarelto, severe AS s/p TAVR, COPD on 3-4L home O2, CKD4, HTN, HLD, T2DM, and BPH who presents as a transfer from [facility] for HF evaluation. Ongoing CHF exacerbation now off bumex gtt, new DENISHA on CKD likely 2/2 ongoing cardiorenal and possible component of abdominal compartment syndrome given tremendous ascites. Patient with prolonged hospitalization and guarded prognosis. Palliative team reconsulted for assistance with GOC.

## 2025-03-19 NOTE — PROGRESS NOTE ADULT - PROBLEM SELECTOR PLAN 3
see above GOC note  patient defers decision making to his wife Judi  pt is DNR/DNI, wishes to continue with conservative medical management at this time

## 2025-03-19 NOTE — PROGRESS NOTE ADULT - SUBJECTIVE AND OBJECTIVE BOX
Indication for Geriatrics and Palliative Care Services/INTERVAL HPI: GOC  SUBJECTIVE AND OBJECTIVE: Pt seen at bedside with wife present. See GOC note.  OVERNIGHT EVENTS: No acute events reported.     DNR on chart:DNI: Trial NIV  DNI: Trial NIV    Allergies    No Known Allergies    Intolerances    MEDICATIONS  (STANDING):  allopurinol 100 milliGRAM(s) Oral daily  atorvastatin 20 milliGRAM(s) Oral at bedtime  calcium acetate 667 milliGRAM(s) Oral three times a day with meals  chlorhexidine 2% Cloths 1 Application(s) Topical daily  dextrose 5%. 1000 milliLiter(s) (50 mL/Hr) IV Continuous <Continuous>  dextrose 5%. 1000 milliLiter(s) (100 mL/Hr) IV Continuous <Continuous>  dextrose 50% Injectable 25 Gram(s) IV Push once  dextrose 50% Injectable 12.5 Gram(s) IV Push once  dextrose 50% Injectable 25 Gram(s) IV Push once  epoetin lashawn (EPOGEN) Injectable 62768 Unit(s) SubCutaneous every 7 days  fluticasone propionate/ salmeterol 250-50 MICROgram(s) Diskus 1 Dose(s) Inhalation two times a day  folic acid 1 milliGRAM(s) Oral daily  glucagon  Injectable 1 milliGRAM(s) IntraMuscular once  hydrALAZINE 25 milliGRAM(s) Oral three times a day  insulin glargine Injectable (LANTUS) 18 Unit(s) SubCutaneous at bedtime  insulin lispro (ADMELOG) corrective regimen sliding scale   SubCutaneous three times a day before meals  insulin lispro Injectable (ADMELOG) 3 Unit(s) SubCutaneous three times a day before meals  isosorbide   dinitrate Tablet (ISORDIL) 10 milliGRAM(s) Oral three times a day  meropenem  IVPB 500 milliGRAM(s) IV Intermittent every 12 hours  metoprolol succinate ER 50 milliGRAM(s) Oral daily  pantoprazole    Tablet 40 milliGRAM(s) Oral every 12 hours  polyethylene glycol 3350 17 Gram(s) Oral two times a day  psyllium Powder 1 Packet(s) Oral daily  sodium chloride 0.65% Nasal 1 Spray(s) Both Nostrils two times a day  tamsulosin 0.4 milliGRAM(s) Oral at bedtime  tiotropium 2.5 MICROgram(s) Inhaler 2 Puff(s) Inhalation daily    MEDICATIONS  (PRN):  acetaminophen     Tablet .. 650 milliGRAM(s) Oral every 6 hours PRN Temp greater or equal to 38C (100.4F), Mild Pain (1 - 3)  albuterol/ipratropium for Nebulization 3 milliLiter(s) Nebulizer every 6 hours PRN Shortness of Breath and/or Wheezing  bisacodyl 5 milliGRAM(s) Oral every 12 hours PRN Constipation  dextrose Oral Gel 15 Gram(s) Oral once PRN Blood Glucose LESS THAN 70 milliGRAM(s)/deciliter  dextrose Oral Gel 15 Gram(s) Oral once PRN Blood Glucose LESS THAN 70 milliGRAM(s)/deciliter  melatonin 3 milliGRAM(s) Oral at bedtime PRN Insomnia  ondansetron Injectable 4 milliGRAM(s) IV Push every 6 hours PRN Nausea and/or Vomiting        ITEMS UNCHECKED ARE NOT PRESENT    PRESENT SYMPTOMS: [ ]Unable to self-report - see [ ] CPOT [ ] PAINADS [ ] RDOS  Source if other than patient:  [ ]Family   [ ]Team     Pain:  [ ]yes [ x]no  QOL impact -   Location -                    Aggravating factors -  Quality -  Radiation -  Timing-  Severity (0-10 scale):  Minimal acceptable level (0-10 scale):     CPOT:    https://[email or web address]/Critical-Care-Pain-Observation-Tool-(CPOT)    Dyspnea:           No                [ ]Mild [ ]Moderate [ ]Severe  Anxiety:                             [ ]Mild [ ]Moderate [ ]Severe  Fatigue:                             [ ]Mild [ ]Moderate [ ]Severe  Nausea:                             [ ]Mild [ ]Moderate [ ]Severe  Loss of appetite:              [ ]Mild [ ]Moderate [ ]Severe  Constipation:                    [ ]Mild [ ]Moderate [ ]Severe    PCSSQ[Palliative Care Spiritual Screening Question]   Severity (0-10):  Score of 4 or > indicate consideration of Chaplaincy referral.  Chaplaincy Referral: [x ] yes [ ] refused [x ] following [ ] Deferred   Seen on 2/20 "Saw pt on rounds.  Pt was confused and unintelligible.  Pt said he had a lot on his mind but didn't want to talk about it.  Chap sat and provided supportive presence."    Caregiver [place]? : [x ] yes [ ] no [ ] Deferred [ ] Declined             Social work referral [ x] Patient & Family Centered Care Referral [ ]     Anticipatory Grief present?:  [ ] yes [ ] no  [x ] Deferred                  Social work referral [ ] Chaplaincy Referral[ ]      Other Symptoms:  [x]All other review of systems negative   [ ]Unable to obtain due to poor mentation    Palliative Performance Status Version 2:     30    %      [email or web address]/[email or web address]_[email or web address]  PHYSICAL EXAM:  Vital Signs Last 24 Hrs  T(C): 36.2 (19 Mar 2025 04:50), Max: 36.9 (18 Mar 2025 20:13)  T(F): 97.2 (19 Mar 2025 04:50), Max: 98.4 (18 Mar 2025 20:13)  HR: 75 (19 Mar 2025 04:50) (69 - 75)  BP: 111/70 (19 Mar 2025 04:50) (111/70 - 135/69)  BP(mean): --  RR: 18 (19 Mar 2025 04:50) (18 - 18)  SpO2: 98% (19 Mar 2025 04:50) (98% - 99%)    Parameters below as of 19 Mar 2025 04:50  Patient On (Oxygen Delivery Method): nasal cannula  O2 Flow (L/min): 4       GENERAL:  [x]Alert  [x]Oriented x 3  [ ]Lethargic  [ ]Cachexia  [ ]Unarousable  [x]Verbal  [ ]Non-Verbal  Behavioral:   [ ]Anxiety  [ ]Delirium [ ]Agitation [ ]Other  HEENT:  [x]Normal   [ ]Dry mouth   [ ]ET Tube/Trach  [ ]Oral lesions  PULMONARY:   [x]Clear [ ]Tachypnea  [ ]Audible excessive secretions   [ ]Rhonchi        [ ]Right [ ]Left [ ]Bilateral  [ ]Crackles        [ ]Right [ ]Left [ ]Bilateral  [ ]Wheezing     [ ]Right [ ]Left [ ]Bilateral  [ ]Diminished BS [ ] Right [ ]Left [ ]Bilateral  CARDIOVASCULAR:    [x]Regular [ ]Irregular [ ]Tachy  [ ]Oscar [ ]Murmur [ ]Other  GASTROINTESTINAL:  [x]Soft  [ ]Distended   [x]+BS  [x]Non tender [ ]Tender  [ ]PEG [ ]OGT/ NGT   Last BM:    GENITOURINARY:  [x]Normal [ ]Incontinent   [ ]Oliguria/Anuria   [ ]Bucio  MUSCULOSKELETAL:   [ ]Normal   [x]Weakness  [ ]Bed/Wheelchair bound [ ]Edema  NEUROLOGIC:   [x]No focal deficits  [ ] Cognitive impairment  [ ] Dysphagia [ ]Dysarthria [ ] Paresis [ ]Other   SKIN:   [x]Normal  [ ]Rash   [ ]Pressure ulcer(s) [ ]y [ ]n present on admission      CRITICAL CARE:  [ ]Shock Present  [ ]Septic [ ]Cardiogenic [ ]Neurologic [ ]Hypovolemic  [ ]Vasopressors [ ]Inotropes  [ ]Respiratory failure present [ ]Mechanical Ventilation [ ]Non-invasive ventilatory support [ ]High-Flow   [ ]Acute  [ ]Chronic [ ]Hypoxic  [ ]Hypercarbic [ ]Other  [ ]Other organ failure     LABS:                          8.9    16.89 )-----------( 147      ( 19 Mar 2025 07:10 )             28.7   03-19    128[L]  |  83[L]  |  104[H]  ----------------------------<  105[H]  4.6   |  27  |  5.54[H]    Ca    8.7      19 Mar 2025 07:10  Phos  6.8     03-18  Mg     2.7     03-18    TPro  6.9  /  Alb  3.2[L]  /  TBili  0.8  /  DBili  x   /  AST  37  /  ALT  11  /  AlkPhos  231[H]  03-19      RADIOLOGY & ADDITIONAL STUDIES:  < from: CT Abdomen and Pelvis No Cont (03.15.25 @ 09:13) >    IMPRESSION:  Moderate volume ascites. Layering hyperdensity within the ascites in the   right paracolic gutter, suggestive of hemoperitoneum.  No retroperitoneal hemorrhage.    Findings were discussed with Dr. LOPEZ DWYER [phone] 3/15/2025 9:43   AM by Dr. Min with read back confirmation.    --- End of Report ---           MARCO MIN MD; Resident Radiologist  This document has been electronically signed.  RAJIV AGUILERA MD; Attending Radiologist  This document has been electronically signed. Mar 15 2025  9:56AM    < end of copied text >      Protein Calorie Malnutrition Present: [ ]mild [ ]moderate [ ]severe [ ]underweight [ ]morbid obesity  https://www.andeal.org/vault/2440/web/files/ONC/Table_Clinical%20Characteristics%20to%20Document%20Malnutrition-White%20JV%20et%20al%202012.pdf    Height (cm): 177.8 (01-10-25 @ 15:59), 180.3 (08-07-24 @ 17:24)  Weight (kg): 107.501 (02-08-25 @ 09:08), 110.9 (01-18-25 @ 10:27), 108.9 (08-07-24 @ 17:24)  BMI (kg/m2): 34 (02-08-25 @ 09:08), 35.1 (01-18-25 @ 10:27), 34.4 (01-10-25 @ 15:59)    [ ]PPSV2 < or = 30%  [ ]significant weight loss [ ]poor nutritional intake [ ]anasarca[ ]Artificial Nutrition    Other REFERRALS:  [ ]Hospice  [ ]Child Life  [ ]Social Work  [ ]Case management [ ]Holistic Therapy     Goals of Care Document:

## 2025-03-20 LAB
GLUCOSE BLDC GLUCOMTR-MCNC: 139 MG/DL — HIGH (ref 70–99)
GLUCOSE BLDC GLUCOMTR-MCNC: 161 MG/DL — HIGH (ref 70–99)
GLUCOSE BLDC GLUCOMTR-MCNC: 178 MG/DL — HIGH (ref 70–99)
GLUCOSE BLDC GLUCOMTR-MCNC: 181 MG/DL — HIGH (ref 70–99)

## 2025-03-20 PROCEDURE — 99233 SBSQ HOSP IP/OBS HIGH 50: CPT

## 2025-03-20 RX ADMIN — Medication 40 MILLIGRAM(S): at 05:23

## 2025-03-20 RX ADMIN — ISOSORBDIE DINITRATE 10 MILLIGRAM(S): 30 TABLET ORAL at 18:18

## 2025-03-20 RX ADMIN — INSULIN GLARGINE-YFGN 18 UNIT(S): 100 INJECTION, SOLUTION SUBCUTANEOUS at 21:44

## 2025-03-20 RX ADMIN — FOLIC ACID 1 MILLIGRAM(S): 1 TABLET ORAL at 13:22

## 2025-03-20 RX ADMIN — INSULIN LISPRO 3 UNIT(S): 100 INJECTION, SOLUTION INTRAVENOUS; SUBCUTANEOUS at 13:25

## 2025-03-20 RX ADMIN — Medication 25 MILLIGRAM(S): at 13:25

## 2025-03-20 RX ADMIN — MEROPENEM 100 MILLIGRAM(S): 1 INJECTION INTRAVENOUS at 05:24

## 2025-03-20 RX ADMIN — ISOSORBDIE DINITRATE 10 MILLIGRAM(S): 30 TABLET ORAL at 12:00

## 2025-03-20 RX ADMIN — FUROSEMIDE 80 MILLIGRAM(S): 10 INJECTION INTRAMUSCULAR; INTRAVENOUS at 22:51

## 2025-03-20 RX ADMIN — INSULIN LISPRO 2: 100 INJECTION, SOLUTION INTRAVENOUS; SUBCUTANEOUS at 18:20

## 2025-03-20 RX ADMIN — Medication 100 MILLIGRAM(S): at 13:23

## 2025-03-20 RX ADMIN — INSULIN LISPRO 3 UNIT(S): 100 INJECTION, SOLUTION INTRAVENOUS; SUBCUTANEOUS at 09:16

## 2025-03-20 RX ADMIN — INSULIN LISPRO 3 UNIT(S): 100 INJECTION, SOLUTION INTRAVENOUS; SUBCUTANEOUS at 18:20

## 2025-03-20 RX ADMIN — POLYETHYLENE GLYCOL 3350 17 GRAM(S): 17 POWDER, FOR SOLUTION ORAL at 18:19

## 2025-03-20 RX ADMIN — FUROSEMIDE 80 MILLIGRAM(S): 10 INJECTION INTRAMUSCULAR; INTRAVENOUS at 13:26

## 2025-03-20 RX ADMIN — Medication 1 APPLICATION(S): at 12:00

## 2025-03-20 RX ADMIN — ISOSORBDIE DINITRATE 10 MILLIGRAM(S): 30 TABLET ORAL at 05:23

## 2025-03-20 RX ADMIN — Medication 667 MILLIGRAM(S): at 18:24

## 2025-03-20 RX ADMIN — Medication 1 DOSE(S): at 05:24

## 2025-03-20 RX ADMIN — INSULIN LISPRO 2: 100 INJECTION, SOLUTION INTRAVENOUS; SUBCUTANEOUS at 09:11

## 2025-03-20 RX ADMIN — POLYETHYLENE GLYCOL 3350 17 GRAM(S): 17 POWDER, FOR SOLUTION ORAL at 05:24

## 2025-03-20 RX ADMIN — Medication 1 SPRAY(S): at 05:23

## 2025-03-20 RX ADMIN — Medication 1 DOSE(S): at 18:22

## 2025-03-20 RX ADMIN — METOPROLOL SUCCINATE 50 MILLIGRAM(S): 50 TABLET, EXTENDED RELEASE ORAL at 09:14

## 2025-03-20 RX ADMIN — Medication 25 MILLIGRAM(S): at 05:23

## 2025-03-20 RX ADMIN — Medication 1 SPRAY(S): at 18:22

## 2025-03-20 RX ADMIN — ATORVASTATIN CALCIUM 20 MILLIGRAM(S): 80 TABLET, FILM COATED ORAL at 21:44

## 2025-03-20 RX ADMIN — Medication 667 MILLIGRAM(S): at 13:32

## 2025-03-20 RX ADMIN — Medication 40 MILLIGRAM(S): at 18:19

## 2025-03-20 RX ADMIN — Medication 667 MILLIGRAM(S): at 09:14

## 2025-03-20 RX ADMIN — INSULIN LISPRO 2: 100 INJECTION, SOLUTION INTRAVENOUS; SUBCUTANEOUS at 13:24

## 2025-03-20 RX ADMIN — FUROSEMIDE 80 MILLIGRAM(S): 10 INJECTION INTRAMUSCULAR; INTRAVENOUS at 05:47

## 2025-03-20 RX ADMIN — Medication 25 MILLIGRAM(S): at 21:44

## 2025-03-20 RX ADMIN — TAMSULOSIN HYDROCHLORIDE 0.4 MILLIGRAM(S): 0.4 CAPSULE ORAL at 21:44

## 2025-03-20 RX ADMIN — TIOTROPIUM BROMIDE INHALATION SPRAY 2 PUFF(S): 3.12 SPRAY, METERED RESPIRATORY (INHALATION) at 13:21

## 2025-03-20 NOTE — PROGRESS NOTE ADULT - SUBJECTIVE AND OBJECTIVE BOX
Contreras Brooks MD  Division of Hospital Medicine  Available on MS teams until 7pm  If no response or off-hours, page [phone]  -------------------------------------    Patient is a 79y old  Male who presents with a chief complaint of HF eval (19 Mar 2025 23:15)      SUBJECTIVE / OVERNIGHT EVENTS: none acute  ADDITIONAL REVIEW OF SYSTEMS: unable to get labs this morning via RN or phlebotomy due to anasarca. Pt notes sob and some R posterior scapular itching.    MEDICATIONS  (STANDING):  allopurinol 100 milliGRAM(s) Oral daily  atorvastatin 20 milliGRAM(s) Oral at bedtime  calcium acetate 667 milliGRAM(s) Oral three times a day with meals  chlorhexidine 2% Cloths 1 Application(s) Topical daily  dextrose 5%. 1000 milliLiter(s) (50 mL/Hr) IV Continuous <Continuous>  dextrose 5%. 1000 milliLiter(s) (100 mL/Hr) IV Continuous <Continuous>  dextrose 50% Injectable 25 Gram(s) IV Push once  dextrose 50% Injectable 12.5 Gram(s) IV Push once  dextrose 50% Injectable 25 Gram(s) IV Push once  epoetin lashawn (EPOGEN) Injectable 34859 Unit(s) SubCutaneous every 7 days  fluticasone propionate/ salmeterol 250-50 MICROgram(s) Diskus 1 Dose(s) Inhalation two times a day  folic acid 1 milliGRAM(s) Oral daily  furosemide    Tablet 80 milliGRAM(s) Oral three times a day  glucagon  Injectable 1 milliGRAM(s) IntraMuscular once  hydrALAZINE 25 milliGRAM(s) Oral three times a day  insulin glargine Injectable (LANTUS) 18 Unit(s) SubCutaneous at bedtime  insulin lispro (ADMELOG) corrective regimen sliding scale   SubCutaneous three times a day before meals  insulin lispro Injectable (ADMELOG) 3 Unit(s) SubCutaneous three times a day before meals  isosorbide   dinitrate Tablet (ISORDIL) 10 milliGRAM(s) Oral three times a day  metoprolol succinate ER 50 milliGRAM(s) Oral daily  pantoprazole    Tablet 40 milliGRAM(s) Oral every 12 hours  polyethylene glycol 3350 17 Gram(s) Oral two times a day  psyllium Powder 1 Packet(s) Oral daily  sodium chloride 0.65% Nasal 1 Spray(s) Both Nostrils two times a day  tamsulosin 0.4 milliGRAM(s) Oral at bedtime  tiotropium 2.5 MICROgram(s) Inhaler 2 Puff(s) Inhalation daily    MEDICATIONS  (PRN):  acetaminophen     Tablet .. 650 milliGRAM(s) Oral every 6 hours PRN Temp greater or equal to 38C (100.4F), Mild Pain (1 - 3)  albuterol/ipratropium for Nebulization 3 milliLiter(s) Nebulizer every 6 hours PRN Shortness of Breath and/or Wheezing  bisacodyl 5 milliGRAM(s) Oral every 12 hours PRN Constipation  dextrose Oral Gel 15 Gram(s) Oral once PRN Blood Glucose LESS THAN 70 milliGRAM(s)/deciliter  dextrose Oral Gel 15 Gram(s) Oral once PRN Blood Glucose LESS THAN 70 milliGRAM(s)/deciliter  melatonin 3 milliGRAM(s) Oral at bedtime PRN Insomnia  ondansetron Injectable 4 milliGRAM(s) IV Push every 6 hours PRN Nausea and/or Vomiting      CAPILLARY BLOOD GLUCOSE      POCT Blood Glucose.: 181 mg/dL (20 Mar 2025 12:42)  POCT Blood Glucose.: 178 mg/dL (20 Mar 2025 08:50)  POCT Blood Glucose.: 125 mg/dL (19 Mar 2025 21:07)  POCT Blood Glucose.: 138 mg/dL (19 Mar 2025 17:23)    I&O's Summary    19 Mar 2025 07:01  -  20 Mar 2025 07:00  --------------------------------------------------------  IN: 340 mL / OUT: 350 mL / NET: -10 mL        PHYSICAL EXAM:  Vital Signs Last 24 Hrs  T(C): 36.4 (20 Mar 2025 11:15), Max: 36.8 (20 Mar 2025 00:06)  T(F): 97.5 (20 Mar 2025 11:15), Max: 98.2 (20 Mar 2025 00:06)  HR: 71 (20 Mar 2025 11:15) (64 - 71)  BP: 116/70 (20 Mar 2025 11:15) (111/58 - 131/72)  BP(mean): --  RR: 18 (20 Mar 2025 11:15) (18 - 18)  SpO2: 99% (20 Mar 2025 11:15) (97% - 99%)    Parameters below as of 20 Mar 2025 11:15  Patient On (Oxygen Delivery Method): nasal cannula  O2 Flow (L/min): 4    CONSTITUTIONAL: NAD  EYES: PERRLA; conjunctiva and sclera clear  ENMT: MMM  NECK: Supple  RESPIRATORY: Normal respiratory effort; CTAB  CARDIOVASCULAR: RRR, no JVD, anasarcic  ABDOMEN: distended  MUSCLOSKELETAL:  no clubbing/cyanosis, no joint swelling or tenderness to palpation  PSYCH: ao x 2-3  NEUROLOGY: CN 2-12 are intact and symmetric; no gross sensory or motor deficits  SKIN: No rashes; no palpable lesions    LABS:                        8.9    16.89 )-----------( 147      ( 19 Mar 2025 07:10 )             28.7     03-19    128[L]  |  83[L]  |  104[H]  ----------------------------<  105[H]  4.6   |  27  |  5.54[H]    Ca    8.7      19 Mar 2025 07:10    TPro  6.9  /  Alb  3.2[L]  /  TBili  0.8  /  DBili  x   /  AST  37  /  ALT  11  /  AlkPhos  231[H]  03-19          Urinalysis Basic - ( 19 Mar 2025 07:10 )    Color: x / Appearance: x / SG: x / pH: x  Gluc: 105 mg/dL / Ketone: x  / Bili: x / Urobili: x   Blood: x / Protein: x / Nitrite: x   Leuk Esterase: x / RBC: x / WBC x   Sq Epi: x / Non Sq Epi: x / Bacteria: x          RADIOLOGY & ADDITIONAL TESTS:  Results Reviewed:   Imaging Personally Reviewed:  Electrocardiogram Personally Reviewed:    COORDINATION OF CARE:  Care Discussed with Consultants/Other Providers [Y/N]:  Prior or Outpatient Records Reviewed [Y/N]:

## 2025-03-20 NOTE — PROGRESS NOTE ADULT - SUBJECTIVE AND OBJECTIVE BOX
Subjective: some exertional dyspnea.       MEDICATIONS  (STANDING):  allopurinol 100 milliGRAM(s) Oral daily  atorvastatin 20 milliGRAM(s) Oral at bedtime  calcium acetate 667 milliGRAM(s) Oral three times a day with meals  chlorhexidine 2% Cloths 1 Application(s) Topical daily  dextrose 5%. 1000 milliLiter(s) (50 mL/Hr) IV Continuous <Continuous>  dextrose 5%. 1000 milliLiter(s) (100 mL/Hr) IV Continuous <Continuous>  dextrose 50% Injectable 25 Gram(s) IV Push once  dextrose 50% Injectable 12.5 Gram(s) IV Push once  dextrose 50% Injectable 25 Gram(s) IV Push once  epoetin lashawn (EPOGEN) Injectable 00527 Unit(s) SubCutaneous every 7 days  fluticasone propionate/ salmeterol 250-50 MICROgram(s) Diskus 1 Dose(s) Inhalation two times a day  folic acid 1 milliGRAM(s) Oral daily  furosemide    Tablet 80 milliGRAM(s) Oral three times a day  glucagon  Injectable 1 milliGRAM(s) IntraMuscular once  hydrALAZINE 25 milliGRAM(s) Oral three times a day  insulin glargine Injectable (LANTUS) 18 Unit(s) SubCutaneous at bedtime  insulin lispro (ADMELOG) corrective regimen sliding scale   SubCutaneous three times a day before meals  insulin lispro Injectable (ADMELOG) 3 Unit(s) SubCutaneous three times a day before meals  isosorbide   dinitrate Tablet (ISORDIL) 10 milliGRAM(s) Oral three times a day  metoprolol succinate ER 50 milliGRAM(s) Oral daily  pantoprazole    Tablet 40 milliGRAM(s) Oral every 12 hours  polyethylene glycol 3350 17 Gram(s) Oral two times a day  psyllium Powder 1 Packet(s) Oral daily  sodium chloride 0.65% Nasal 1 Spray(s) Both Nostrils two times a day  tamsulosin 0.4 milliGRAM(s) Oral at bedtime  tiotropium 2.5 MICROgram(s) Inhaler 2 Puff(s) Inhalation daily    MEDICATIONS  (PRN):  acetaminophen     Tablet .. 650 milliGRAM(s) Oral every 6 hours PRN Temp greater or equal to 38C (100.4F), Mild Pain (1 - 3)  albuterol/ipratropium for Nebulization 3 milliLiter(s) Nebulizer every 6 hours PRN Shortness of Breath and/or Wheezing  bisacodyl 5 milliGRAM(s) Oral every 12 hours PRN Constipation  dextrose Oral Gel 15 Gram(s) Oral once PRN Blood Glucose LESS THAN 70 milliGRAM(s)/deciliter  dextrose Oral Gel 15 Gram(s) Oral once PRN Blood Glucose LESS THAN 70 milliGRAM(s)/deciliter  melatonin 3 milliGRAM(s) Oral at bedtime PRN Insomnia  ondansetron Injectable 4 milliGRAM(s) IV Push every 6 hours PRN Nausea and/or Vomiting          T(C): 36.4 (03-20-25 @ 11:15), Max: 36.8 (03-20-25 @ 00:06)  HR: 71 (03-20-25 @ 11:15) (64 - 71)  BP: 116/70 (03-20-25 @ 11:15) (111/58 - 131/72)  RR: 18 (03-20-25 @ 11:15) (18 - 18)  SpO2: 99% (03-20-25 @ 11:15) (97% - 99%)  Wt(kg): --        I&O's Detail    19 Mar 2025 07:01  -  20 Mar 2025 07:00  --------------------------------------------------------  IN:    Oral Fluid: 340 mL  Total IN: 340 mL    OUT:    Indwelling Catheter - Urethral (mL): 350 mL  Total OUT: 350 mL    Total NET: -10 mL      20 Mar 2025 07:01  -  20 Mar 2025 15:07  --------------------------------------------------------  IN:    Oral Fluid: 100 mL  Total IN: 100 mL    OUT:  Total OUT: 0 mL    Total NET: 100 mL               PHYSICAL EXAM:    CHEST/LUNG: dec BS  HEART: S1S2  ABDOMEN: Soft, Nontender, Nondistended; Bowel sounds present  EXTREMITIES: anasarca, scrotal edema, LE bullae      LABS:  CBC Full  -  ( 19 Mar 2025 07:10 )  WBC Count : 16.89 K/uL  RBC Count : 3.30 M/uL  Hemoglobin : 8.9 g/dL  Hematocrit : 28.7 %  Platelet Count - Automated : 147 K/uL  Mean Cell Volume : 87.0 fl  Mean Cell Hemoglobin : 27.0 pg  Mean Cell Hemoglobin Concentration : 31.0 g/dL  Auto Neutrophil # : x  Auto Lymphocyte # : x  Auto Monocyte # : x  Auto Eosinophil # : x  Auto Basophil # : x  Auto Neutrophil % : x  Auto Lymphocyte % : x  Auto Monocyte % : x  Auto Eosinophil % : x  Auto Basophil % : x    03-19    128[L]  |  83[L]  |  104[H]  ----------------------------<  105[H]  4.6   |  27  |  5.54[H]    Ca    8.7      19 Mar 2025 07:10    TPro  6.9  /  Alb  3.2[L]  /  TBili  0.8  /  DBili  x   /  AST  37  /  ALT  11  /  AlkPhos  231[H]  03-19        Impression:    CM, EF ~ 30%, severe TR  COPD, on home O2  Adm to [facility] VS 1/10/25 w/fluid overload   Tx to [facility] 2/5/25 for HF eval   Known CKD (baseline Cr ~ 2. - 2.5)  Hemodynamic/cardio-renal DENISHA  S/p LVP 3/14 w/4.5L fluid removal, s/p SPA x 4   Dropped Hgb to 5.3 on 3/15, s/p PRBCs   Sharp rise in Cr iso above     Recommendations:   Non-dialytic management of his advanced CKD re-confirmed with family  GOC discussion under way.   Cont palliative measures including high dose diuretic to mitigate pulm edema and anasarca

## 2025-03-20 NOTE — PROGRESS NOTE ADULT - PROBLEM SELECTOR PLAN 3
- Likely DENISHA in setting of cardiorenal syndrome  - Continue monitoring Cr and urine output. Nephrology following, appreciate recs.    #Leukocytosis- rising white count over the past couple days; also with abd distension and pain. Ddx includes severe constipation given suboptimal BM vs. less likely SBP from cardiac ascites vs. possible UTI  - UA positive, started renally dosed meropenem, f/u ucx  - pt with large bm x 2, now constipated again; cont aggressive bowel regimen with suppositories/enema as needed  - f/u dx/tx para given large ascites on imaging, neg for SBP  - cont trending cbc

## 2025-03-21 LAB
ANION GAP SERPL CALC-SCNC: 18 MMOL/L — HIGH (ref 5–17)
APTT BLD: 28.2 SEC — SIGNIFICANT CHANGE UP (ref 24.5–35.6)
BUN SERPL-MCNC: 126 MG/DL — HIGH (ref 7–23)
CALCIUM SERPL-MCNC: 8.8 MG/DL — SIGNIFICANT CHANGE UP (ref 8.4–10.5)
CHLORIDE SERPL-SCNC: 82 MMOL/L — LOW (ref 96–108)
CO2 SERPL-SCNC: 26 MMOL/L — SIGNIFICANT CHANGE UP (ref 22–31)
CREAT SERPL-MCNC: 6.75 MG/DL — HIGH (ref 0.5–1.3)
EGFR: 8 ML/MIN/1.73M2 — LOW
EGFR: 8 ML/MIN/1.73M2 — LOW
GLUCOSE BLDC GLUCOMTR-MCNC: 146 MG/DL — HIGH (ref 70–99)
GLUCOSE BLDC GLUCOMTR-MCNC: 149 MG/DL — HIGH (ref 70–99)
GLUCOSE BLDC GLUCOMTR-MCNC: 149 MG/DL — HIGH (ref 70–99)
GLUCOSE BLDC GLUCOMTR-MCNC: 177 MG/DL — HIGH (ref 70–99)
GLUCOSE SERPL-MCNC: 176 MG/DL — HIGH (ref 70–99)
HCT VFR BLD CALC: 29.3 % — LOW (ref 39–50)
HGB BLD-MCNC: 8.9 G/DL — LOW (ref 13–17)
INR BLD: 1.04 RATIO — SIGNIFICANT CHANGE UP (ref 0.85–1.16)
MCHC RBC-ENTMCNC: 26.7 PG — LOW (ref 27–34)
MCHC RBC-ENTMCNC: 30.4 G/DL — LOW (ref 32–36)
MCV RBC AUTO: 88 FL — SIGNIFICANT CHANGE UP (ref 80–100)
NRBC BLD AUTO-RTO: 0 /100 WBCS — SIGNIFICANT CHANGE UP (ref 0–0)
PLATELET # BLD AUTO: 146 K/UL — LOW (ref 150–400)
POTASSIUM SERPL-MCNC: 5.1 MMOL/L — SIGNIFICANT CHANGE UP (ref 3.5–5.3)
POTASSIUM SERPL-SCNC: 5.1 MMOL/L — SIGNIFICANT CHANGE UP (ref 3.5–5.3)
PROTHROM AB SERPL-ACNC: 12 SEC — SIGNIFICANT CHANGE UP (ref 9.9–13.4)
RBC # BLD: 3.33 M/UL — LOW (ref 4.2–5.8)
RBC # FLD: 16.9 % — HIGH (ref 10.3–14.5)
SODIUM SERPL-SCNC: 126 MMOL/L — LOW (ref 135–145)
WBC # BLD: 15.6 K/UL — HIGH (ref 3.8–10.5)
WBC # FLD AUTO: 15.6 K/UL — HIGH (ref 3.8–10.5)

## 2025-03-21 PROCEDURE — 99233 SBSQ HOSP IP/OBS HIGH 50: CPT

## 2025-03-21 RX ORDER — SODIUM ZIRCONIUM CYCLOSILICATE 5 G/5G
10 POWDER, FOR SUSPENSION ORAL
Refills: 0 | Status: COMPLETED | OUTPATIENT
Start: 2025-03-21 | End: 2025-03-22

## 2025-03-21 RX ORDER — BUMETANIDE 1 MG/1
4 TABLET ORAL EVERY 8 HOURS
Refills: 0 | Status: DISCONTINUED | OUTPATIENT
Start: 2025-03-21 | End: 2025-03-24

## 2025-03-21 RX ADMIN — Medication 1 APPLICATION(S): at 12:00

## 2025-03-21 RX ADMIN — Medication 1 PACKET(S): at 12:34

## 2025-03-21 RX ADMIN — FUROSEMIDE 80 MILLIGRAM(S): 10 INJECTION INTRAMUSCULAR; INTRAVENOUS at 06:39

## 2025-03-21 RX ADMIN — Medication 667 MILLIGRAM(S): at 19:13

## 2025-03-21 RX ADMIN — Medication 40 MILLIGRAM(S): at 05:15

## 2025-03-21 RX ADMIN — Medication 667 MILLIGRAM(S): at 12:35

## 2025-03-21 RX ADMIN — INSULIN LISPRO 2: 100 INJECTION, SOLUTION INTRAVENOUS; SUBCUTANEOUS at 08:56

## 2025-03-21 RX ADMIN — TIOTROPIUM BROMIDE INHALATION SPRAY 2 PUFF(S): 3.12 SPRAY, METERED RESPIRATORY (INHALATION) at 12:34

## 2025-03-21 RX ADMIN — Medication 1 DOSE(S): at 19:13

## 2025-03-21 RX ADMIN — Medication 667 MILLIGRAM(S): at 09:01

## 2025-03-21 RX ADMIN — INSULIN LISPRO 3 UNIT(S): 100 INJECTION, SOLUTION INTRAVENOUS; SUBCUTANEOUS at 08:57

## 2025-03-21 RX ADMIN — ISOSORBDIE DINITRATE 10 MILLIGRAM(S): 30 TABLET ORAL at 19:12

## 2025-03-21 RX ADMIN — METOPROLOL SUCCINATE 50 MILLIGRAM(S): 50 TABLET, EXTENDED RELEASE ORAL at 08:58

## 2025-03-21 RX ADMIN — ISOSORBDIE DINITRATE 10 MILLIGRAM(S): 30 TABLET ORAL at 12:33

## 2025-03-21 RX ADMIN — POLYETHYLENE GLYCOL 3350 17 GRAM(S): 17 POWDER, FOR SOLUTION ORAL at 19:14

## 2025-03-21 RX ADMIN — ISOSORBDIE DINITRATE 10 MILLIGRAM(S): 30 TABLET ORAL at 05:15

## 2025-03-21 RX ADMIN — BUMETANIDE 132 MILLIGRAM(S): 1 TABLET ORAL at 23:22

## 2025-03-21 RX ADMIN — Medication 25 MILLIGRAM(S): at 05:15

## 2025-03-21 RX ADMIN — Medication 1 SPRAY(S): at 05:15

## 2025-03-21 RX ADMIN — INSULIN LISPRO 3 UNIT(S): 100 INJECTION, SOLUTION INTRAVENOUS; SUBCUTANEOUS at 13:50

## 2025-03-21 RX ADMIN — BUMETANIDE 132 MILLIGRAM(S): 1 TABLET ORAL at 12:17

## 2025-03-21 RX ADMIN — TAMSULOSIN HYDROCHLORIDE 0.4 MILLIGRAM(S): 0.4 CAPSULE ORAL at 21:50

## 2025-03-21 RX ADMIN — Medication 1 DOSE(S): at 05:16

## 2025-03-21 RX ADMIN — ATORVASTATIN CALCIUM 20 MILLIGRAM(S): 80 TABLET, FILM COATED ORAL at 21:50

## 2025-03-21 RX ADMIN — Medication 25 MILLIGRAM(S): at 19:11

## 2025-03-21 RX ADMIN — INSULIN GLARGINE-YFGN 18 UNIT(S): 100 INJECTION, SOLUTION SUBCUTANEOUS at 21:49

## 2025-03-21 RX ADMIN — SODIUM ZIRCONIUM CYCLOSILICATE 10 GRAM(S): 5 POWDER, FOR SUSPENSION ORAL at 19:17

## 2025-03-21 RX ADMIN — FOLIC ACID 1 MILLIGRAM(S): 1 TABLET ORAL at 12:34

## 2025-03-21 RX ADMIN — Medication 100 MILLIGRAM(S): at 12:34

## 2025-03-21 RX ADMIN — Medication 1 SPRAY(S): at 19:13

## 2025-03-21 NOTE — CHART NOTE - NSCHARTNOTEFT_GEN_A_CORE
Pt and wife both now agreeable to trial of HD. Notified Dr. Oh Cotton covering nephrologist. Will consult IR for shiley- wife prefers it be placed on monday, however understands if pt needs urgent HD prior to that he'll have emergent line placed.

## 2025-03-21 NOTE — PROGRESS NOTE ADULT - ASSESSMENT
80 y/o male w/ PMHx CAD s/p CABG, HF (combined HFrEF [EF 25%] and HFpEF [G3DD]) w/ ICD, AFib on Xarelto, severe AS s/p TAVR, COPD on 3-4L home O2, CKD4, HTN, HLD, T2DM, and BPH who presents as a transfer from [facility] for HF evaluation. Ongoing CHF exacerbation now off bumex gtt, new DENISHA on CKD likely 2/2 ongoing cardiorenal and possible component of abdominal compartment syndrome given tremendous ascites, now awaiting decision from wife/pt on possibility of starting HD

## 2025-03-21 NOTE — PROGRESS NOTE ADULT - CONVERSATION DETAILS
Met with patient and wife at bedside. Patient defers discussion regarding GOC to wife Judi. Judi has good understanding of patient's condition but wishes to give patient more time. Discussed that given advanced heart failure patient's prognosis and limited treatment options based on wishes for conservative management, prognosis remains guarded and time is limited. Wife understanding but needs more time as a family to discuss options regarding next steps including transition to hospice. All questions answered and support provided.
Palliative team introduced and role in patient's care explained. Patient deferred GOC discussion at this time requesting his wife to be present. Pt gave permission to contact wife to coordinate meeting. Called patient's wife Judi who was receptive to meeting but requesting to meet tomorrow as she stayed in hospital overnight and went home for the afternoon. Plan for family meeting tomorrow at 10AM.
Discussed with daughter Arminda via phone and with wife at bedside pt's worsening overall status- slightly rising o2 requirements and worsening renal function, his protracted hospital stay due to heart failure which was refractory to being able to managed off bumex gtt and overall worsening prognosis if he does not proceed with HD. Pt and wife still not amenable to HD, and are not yet ready for comfort care/hospice. They wish to continue conservative management in hopes that he improves and stabilizes. Will revisit GOC day by day. Pt remains DNR/DNI.
Discussed worsening renal function with wife who now states she feels patient should maybe try HD to buy him more time for recovery and would also help in getting his affairs in order. She requested to speak to him alone without my help and noted he is still against HD. Wants to continue medical management for now. I told her it is unlikely to turn him around unfortunately and that I anticipate he would benefit more from comfort measures in the coming days. She understands and wants to continue trying to convince him to try HD.    I offered my presence and palliative care involvement for the conversation but as evidenced by past experiences pt becomes very anxious and irate at these discussions when had by medical providers and defers all to his wife, so she felt she should try to address this herself.     Will keep nephrology updated on decision and consult IR if needed for jackie. I advised her that placing shiley today would be optimal rather than waiting for an emergency situation. She understands.
Discussed worsening prognosis with wife and pt who are still not amenable to HD or CT with IV contrast/embolization due to ESRD risk. I explained that without further interventions to assess bleeding that he will likely continue to worsen. I noted that even if bleeding resolves, that he is still bumex dependent for  urine output and is unlikely to stay off the drip and that given the worsening renal function, bumex will be less effective than before.     Wife appeared distressed and wanted to continue current conservative management, did not feel ready for hospice/palliative, but did say that after a few days if patient doesn't improve she'd like to take him home on hospice. With her permission, I discussed with her and daughter Arminda [phone] over the phone the situation and noted that on his current trajectory, pt has about 2-4 day prognosis. I urged them to consider palliative care/hospice and noted that even at this stage, he likely cannot be discharged home and best option for his care would be inpatient PCU/comfort care.    They wish to confer amongst themselves and will update our team once they have made a decision about palliative care reconsult vs. hospice and further C meetings.

## 2025-03-21 NOTE — PROGRESS NOTE ADULT - SUBJECTIVE AND OBJECTIVE BOX
Contreras Brooks MD  Division of Hospital Medicine  Available on MS teams until 7pm  If no response or off-hours, page [phone]  -------------------------------------    Patient is a 79y old  Male who presents with a chief complaint of HF eval (21 Mar 2025 08:01)    SUBJECTIVE / OVERNIGHT EVENTS: none acute  ADDITIONAL REVIEW OF SYSTEMS: pt reports feeling better/more energy without any significant sob. Wife states pt still has affairs they discovered need to be addressed, so is hoping he will be amenable to HD.     MEDICATIONS  (STANDING):  allopurinol 100 milliGRAM(s) Oral daily  atorvastatin 20 milliGRAM(s) Oral at bedtime  buMETAnide IVPB 4 milliGRAM(s) IV Intermittent every 8 hours  calcium acetate 667 milliGRAM(s) Oral three times a day with meals  chlorhexidine 2% Cloths 1 Application(s) Topical daily  dextrose 5%. 1000 milliLiter(s) (50 mL/Hr) IV Continuous <Continuous>  dextrose 5%. 1000 milliLiter(s) (100 mL/Hr) IV Continuous <Continuous>  dextrose 50% Injectable 25 Gram(s) IV Push once  dextrose 50% Injectable 12.5 Gram(s) IV Push once  dextrose 50% Injectable 25 Gram(s) IV Push once  epoetin lashawn (EPOGEN) Injectable 93951 Unit(s) SubCutaneous every 7 days  fluticasone propionate/ salmeterol 250-50 MICROgram(s) Diskus 1 Dose(s) Inhalation two times a day  folic acid 1 milliGRAM(s) Oral daily  glucagon  Injectable 1 milliGRAM(s) IntraMuscular once  hydrALAZINE 25 milliGRAM(s) Oral three times a day  insulin glargine Injectable (LANTUS) 18 Unit(s) SubCutaneous at bedtime  insulin lispro (ADMELOG) corrective regimen sliding scale   SubCutaneous three times a day before meals  insulin lispro Injectable (ADMELOG) 3 Unit(s) SubCutaneous three times a day before meals  isosorbide   dinitrate Tablet (ISORDIL) 10 milliGRAM(s) Oral three times a day  metoprolol succinate ER 50 milliGRAM(s) Oral daily  pantoprazole    Tablet 40 milliGRAM(s) Oral every 12 hours  polyethylene glycol 3350 17 Gram(s) Oral two times a day  psyllium Powder 1 Packet(s) Oral daily  sodium chloride 0.65% Nasal 1 Spray(s) Both Nostrils two times a day  tamsulosin 0.4 milliGRAM(s) Oral at bedtime  tiotropium 2.5 MICROgram(s) Inhaler 2 Puff(s) Inhalation daily    MEDICATIONS  (PRN):  acetaminophen     Tablet .. 650 milliGRAM(s) Oral every 6 hours PRN Temp greater or equal to 38C (100.4F), Mild Pain (1 - 3)  albuterol/ipratropium for Nebulization 3 milliLiter(s) Nebulizer every 6 hours PRN Shortness of Breath and/or Wheezing  bisacodyl 5 milliGRAM(s) Oral every 12 hours PRN Constipation  dextrose Oral Gel 15 Gram(s) Oral once PRN Blood Glucose LESS THAN 70 milliGRAM(s)/deciliter  dextrose Oral Gel 15 Gram(s) Oral once PRN Blood Glucose LESS THAN 70 milliGRAM(s)/deciliter  melatonin 3 milliGRAM(s) Oral at bedtime PRN Insomnia  ondansetron Injectable 4 milliGRAM(s) IV Push every 6 hours PRN Nausea and/or Vomiting      CAPILLARY BLOOD GLUCOSE      POCT Blood Glucose.: 177 mg/dL (21 Mar 2025 08:38)  POCT Blood Glucose.: 139 mg/dL (20 Mar 2025 21:38)  POCT Blood Glucose.: 161 mg/dL (20 Mar 2025 17:22)  POCT Blood Glucose.: 181 mg/dL (20 Mar 2025 12:42)    I&O's Summary    20 Mar 2025 07:01  -  21 Mar 2025 07:00  --------------------------------------------------------  IN: 220 mL / OUT: 0 mL / NET: 220 mL        PHYSICAL EXAM:  Vital Signs Last 24 Hrs  T(C): 36.4 (21 Mar 2025 05:09), Max: 36.7 (20 Mar 2025 20:23)  T(F): 97.6 (21 Mar 2025 05:09), Max: 98 (20 Mar 2025 20:23)  HR: 65 (21 Mar 2025 05:09) (62 - 65)  BP: 127/76 (21 Mar 2025 05:09) (125/70 - 127/76)  BP(mean): --  RR: 18 (21 Mar 2025 05:09) (18 - 18)  SpO2: 95% (21 Mar 2025 05:09) (95% - 99%)    Parameters below as of 21 Mar 2025 05:09  Patient On (Oxygen Delivery Method): nasal cannula  O2 Flow (L/min): 4    CONSTITUTIONAL: NAD  EYES: PERRLA; conjunctiva and sclera clear  ENMT: MMM  NECK: Supple  RESPIRATORY: Normal respiratory effort; CTAB  CARDIOVASCULAR: RRR, no JVD, anasarcic  ABDOMEN: distended  MUSCLOSKELETAL:  no clubbing/cyanosis, no joint swelling or tenderness to palpation  PSYCH: A+O x 2-3, affect normal  NEUROLOGY: CN 2-12 are intact and symmetric; no gross sensory or motor deficits  SKIN: No rashes; no palpable lesions    LABS:                        8.9    15.60 )-----------( 146      ( 21 Mar 2025 07:06 )             29.3     03-21    126[L]  |  82[L]  |  126[H]  ----------------------------<  176[H]  5.1   |  26  |  6.75[H]    Ca    8.8      21 Mar 2025 07:06            Urinalysis Basic - ( 21 Mar 2025 07:06 )    Color: x / Appearance: x / SG: x / pH: x  Gluc: 176 mg/dL / Ketone: x  / Bili: x / Urobili: x   Blood: x / Protein: x / Nitrite: x   Leuk Esterase: x / RBC: x / WBC x   Sq Epi: x / Non Sq Epi: x / Bacteria: x          RADIOLOGY & ADDITIONAL TESTS:  Results Reviewed:   Imaging Personally Reviewed:  Electrocardiogram Personally Reviewed:    COORDINATION OF CARE:  Care Discussed with Consultants/Other Providers [Y/N]: nephrology  Prior or Outpatient Records Reviewed [Y/N]:

## 2025-03-21 NOTE — PROGRESS NOTE ADULT - SUBJECTIVE AND OBJECTIVE BOX
Subjective: mild nausea. Some MARR.       MEDICATIONS  (STANDING):  allopurinol 100 milliGRAM(s) Oral daily  atorvastatin 20 milliGRAM(s) Oral at bedtime  calcium acetate 667 milliGRAM(s) Oral three times a day with meals  chlorhexidine 2% Cloths 1 Application(s) Topical daily  dextrose 5%. 1000 milliLiter(s) (50 mL/Hr) IV Continuous <Continuous>  dextrose 5%. 1000 milliLiter(s) (100 mL/Hr) IV Continuous <Continuous>  dextrose 50% Injectable 25 Gram(s) IV Push once  dextrose 50% Injectable 12.5 Gram(s) IV Push once  dextrose 50% Injectable 25 Gram(s) IV Push once  epoetin lashawn (EPOGEN) Injectable 57265 Unit(s) SubCutaneous every 7 days  fluticasone propionate/ salmeterol 250-50 MICROgram(s) Diskus 1 Dose(s) Inhalation two times a day  folic acid 1 milliGRAM(s) Oral daily  furosemide    Tablet 80 milliGRAM(s) Oral three times a day  glucagon  Injectable 1 milliGRAM(s) IntraMuscular once  hydrALAZINE 25 milliGRAM(s) Oral three times a day  insulin glargine Injectable (LANTUS) 18 Unit(s) SubCutaneous at bedtime  insulin lispro (ADMELOG) corrective regimen sliding scale   SubCutaneous three times a day before meals  insulin lispro Injectable (ADMELOG) 3 Unit(s) SubCutaneous three times a day before meals  isosorbide   dinitrate Tablet (ISORDIL) 10 milliGRAM(s) Oral three times a day  metoprolol succinate ER 50 milliGRAM(s) Oral daily  pantoprazole    Tablet 40 milliGRAM(s) Oral every 12 hours  polyethylene glycol 3350 17 Gram(s) Oral two times a day  psyllium Powder 1 Packet(s) Oral daily  sodium chloride 0.65% Nasal 1 Spray(s) Both Nostrils two times a day  tamsulosin 0.4 milliGRAM(s) Oral at bedtime  tiotropium 2.5 MICROgram(s) Inhaler 2 Puff(s) Inhalation daily    MEDICATIONS  (PRN):  acetaminophen     Tablet .. 650 milliGRAM(s) Oral every 6 hours PRN Temp greater or equal to 38C (100.4F), Mild Pain (1 - 3)  albuterol/ipratropium for Nebulization 3 milliLiter(s) Nebulizer every 6 hours PRN Shortness of Breath and/or Wheezing  bisacodyl 5 milliGRAM(s) Oral every 12 hours PRN Constipation  dextrose Oral Gel 15 Gram(s) Oral once PRN Blood Glucose LESS THAN 70 milliGRAM(s)/deciliter  dextrose Oral Gel 15 Gram(s) Oral once PRN Blood Glucose LESS THAN 70 milliGRAM(s)/deciliter  melatonin 3 milliGRAM(s) Oral at bedtime PRN Insomnia  ondansetron Injectable 4 milliGRAM(s) IV Push every 6 hours PRN Nausea and/or Vomiting          T(C): 36.4 (03-21-25 @ 05:09), Max: 36.7 (03-20-25 @ 20:23)  HR: 65 (03-21-25 @ 05:09) (62 - 71)  BP: 127/76 (03-21-25 @ 05:09) (116/70 - 127/76)  RR: 18 (03-21-25 @ 05:09) (18 - 18)  SpO2: 95% (03-21-25 @ 05:09) (95% - 99%)  Wt(kg): --        I&O's Detail    20 Mar 2025 07:01  -  21 Mar 2025 07:00  --------------------------------------------------------  IN:    Oral Fluid: 220 mL  Total IN: 220 mL    OUT:  Total OUT: 0 mL    Total NET: 220 mL               PHYSICAL EXAM:    CHEST/LUNG: Clear  HEART: S1S2  ABDOMEN: Soft, distended, abd wall ascites.   EXTREMITIES: anasarca, scrotal edema.       LABS:  CBC Full  -  ( 21 Mar 2025 07:06 )  WBC Count : 15.60 K/uL  RBC Count : 3.33 M/uL  Hemoglobin : 8.9 g/dL  Hematocrit : 29.3 %  Platelet Count - Automated : 146 K/uL  Mean Cell Volume : 88.0 fl  Mean Cell Hemoglobin : 26.7 pg  Mean Cell Hemoglobin Concentration : 30.4 g/dL  Auto Neutrophil # : x  Auto Lymphocyte # : x  Auto Monocyte # : x  Auto Eosinophil # : x  Auto Basophil # : x  Auto Neutrophil % : x  Auto Lymphocyte % : x  Auto Monocyte % : x  Auto Eosinophil % : x  Auto Basophil % : x    03-21    126[L]  |  82[L]  |  126[H]  ----------------------------<  176[H]  5.1   |  26  |  6.75[H]    Ca    8.8      21 Mar 2025 07:06        Impression:    CM, EF ~ 30%, severe TR  COPD, on home O2  Adm to [facility] VS 1/10/25 w/fluid overload   Tx to [facility] 2/5/25 for HF eval   Known CKD (baseline Cr ~ 2. - 2.5)  Hemodynamic/cardio-renal DENISHA  S/p LVP 3/14 w/4.5L fluid removal, s/p SPA x 4   Dropped Hgb to 5.3 on 3/15, s/p PRBCs   Sharp rise in Cr iso above     Recommendations:     Non-dialytic management of his advanced CKD re-confirmed with family  GOC discussion under way.   Cont palliative measures including high dose diuretic to mitigate pulm edema and anasarca

## 2025-03-21 NOTE — PROGRESS NOTE ADULT - PROBLEM SELECTOR PLAN 1
Sudden drop in Hgb from 7.5 to 5.5 on 3/15 s/p para 3/14. Now stable hb around 9  - worsening DENISHA precludes safe DDAVP use, cont transfusing PRN to goal Hb 8  - CTAP with Moderate volume ascites. Layering hyperdensity within the ascites in the right paracolic gutter, suggestive of hemoperitoneum. Now resolved  - s/p 2 units of pRBC on 3/14, 1 unit prbc 3/16, 3/17  - IR consulted and following the case  - consider resuming xarelto/aspirin either after shiley (if pt amenable to HD) or in the next 1-2 days

## 2025-03-21 NOTE — CONSULT NOTE ADULT - SUBJECTIVE AND OBJECTIVE BOX
Interventional Radiology    Evaluate for Procedure: Non-tunneled HD catheter placement    HPI: 78 y/o male w/ PMHx CAD s/p CABG, HF (combined HFrEF [EF 25%] and HFpEF [G3DD]) w/ ICD, AFib on Xarelto, severe AS s/p TAVR, COPD on 3-4L home O2, CKD4, HTN, HLD, T2DM, and BPH who presents as a transfer from [facility] for HF evaluation. Ongoing CHF exacerbation now off bumex gtt, new DENISHA on CKD.    IR consulted for non-tunneled HD catheter placement. Pt's wife now amenable to HD.    Allergies: No Known Allergies    Medications (Abx/Cardiac/Anticoagulation/Blood Products)    buMETAnide IVPB: 132 mL/Hr IV Intermittent (03-21 @ 12:17)  furosemide    Tablet: 80 milliGRAM(s) Oral (03-21 @ 06:39)  hydrALAZINE: 25 milliGRAM(s) Oral (03-21 @ 05:15)  isosorbide   dinitrate Tablet (ISORDIL): 10 milliGRAM(s) Oral (03-21 @ 12:33)  meropenem  IVPB: 100 mL/Hr IV Intermittent (03-20 @ 05:24)  metoprolol succinate ER: 50 milliGRAM(s) Oral (03-21 @ 08:58)    Data:    T(C): 36.6  HR: 67  BP: 115/67  RR: 18  SpO2: 98%    -WBC 15.60 / HgB 8.9 / Hct 29.3 / Plt 146  -Na 126 / Cl 82 /  / Glucose 176  -K 5.1 / CO2 26 / Cr 6.75  -ALT -- / Alk Phos -- / T.Bili --  -INR 1.77 / PTT 31.1    Radiology: Reviewed.    Assessment/Plan: 78 y/o male w/ PMHx CAD s/p CABG, HF (combined HFrEF [EF 25%] and HFpEF [G3DD]) w/ ICD, AFib on Xarelto, severe AS s/p TAVR, COPD on 3-4L home O2, CKD4, HTN, HLD, T2DM, and BPH who presents as a transfer from [facility] for HF evaluation. Ongoing CHF exacerbation now off bumex gtt, new DENISHA on CKD.    IR consulted for non-tunneled HD catheter placement. Pt's wife now amenable to HD.    - case reviewed and approved for 2/24/25  - please place IR procedure order under ELIZABETH Grady  - STAT labs in AM (cbc,coags, bmp, T&S)  - not currently on any AC, please contact IR if considering placing on AC  - does not need to be NPO  - d/w primary team Interventional Radiology    Evaluate for Procedure: Non-tunneled HD catheter placement    HPI: 78 y/o male w/ PMHx CAD s/p CABG, HF (combined HFrEF [EF 25%] and HFpEF [G3DD]) w/ ICD, AFib on Xarelto, severe AS s/p TAVR, COPD on 3-4L home O2, CKD4, HTN, HLD, T2DM, and BPH who presents as a transfer from [facility] for HF evaluation. Ongoing CHF exacerbation now off bumex gtt, new DENISHA on CKD.    IR consulted for non-tunneled HD catheter placement. Pt's wife now amenable to HD.    Allergies: No Known Allergies    Medications (Abx/Cardiac/Anticoagulation/Blood Products)    buMETAnide IVPB: 132 mL/Hr IV Intermittent (03-21 @ 12:17)  furosemide    Tablet: 80 milliGRAM(s) Oral (03-21 @ 06:39)  hydrALAZINE: 25 milliGRAM(s) Oral (03-21 @ 05:15)  isosorbide   dinitrate Tablet (ISORDIL): 10 milliGRAM(s) Oral (03-21 @ 12:33)  meropenem  IVPB: 100 mL/Hr IV Intermittent (03-20 @ 05:24)  metoprolol succinate ER: 50 milliGRAM(s) Oral (03-21 @ 08:58)    Data:    T(C): 36.6  HR: 67  BP: 115/67  RR: 18  SpO2: 98%    -WBC 15.60 / HgB 8.9 / Hct 29.3 / Plt 146  -Na 126 / Cl 82 /  / Glucose 176  -K 5.1 / CO2 26 / Cr 6.75  -ALT -- / Alk Phos -- / T.Bili --  -INR 1.77 / PTT 31.1    Radiology: Reviewed.    Assessment/Plan: 78 y/o male w/ PMHx CAD s/p CABG, HF (combined HFrEF [EF 25%] and HFpEF [G3DD]) w/ ICD, AFib on Xarelto, severe AS s/p TAVR, COPD on 3-4L home O2, CKD4, HTN, HLD, T2DM, and BPH who presents as a transfer from [facility] for HF evaluation. Ongoing CHF exacerbation now off bumex gtt, new DENISHA on CKD.    IR consulted for non-tunneled HD catheter placement. Pt's wife now amenable to HD.    - case reviewed and approved for 3/24/25  - please place IR procedure order under ELIZABETH Grady  - STAT labs in AM (cbc,coags, bmp, T&S)  - not currently on any AC, please contact IR if considering placing on AC  - does not need to be NPO  - d/w primary team

## 2025-03-21 NOTE — CHART NOTE - NSCHARTNOTEFT_GEN_A_CORE
Palliative team following for GOC.  Pt wishes to pursue trial of HD. Nephrology and hospitalist notes appreciated.  Will sign off. Case discussed with Dr. Brooks.    Ifrah Johnson MD  Geriatrics and Palliative Medicine Attending  [facility] pager: [phone]

## 2025-03-22 LAB
ANION GAP SERPL CALC-SCNC: 22 MMOL/L — HIGH (ref 5–17)
BUN SERPL-MCNC: 118 MG/DL — HIGH (ref 7–23)
CALCIUM SERPL-MCNC: 9 MG/DL — SIGNIFICANT CHANGE UP (ref 8.4–10.5)
CHLORIDE SERPL-SCNC: 80 MMOL/L — LOW (ref 96–108)
CO2 SERPL-SCNC: 25 MMOL/L — SIGNIFICANT CHANGE UP (ref 22–31)
CREAT SERPL-MCNC: 7.25 MG/DL — HIGH (ref 0.5–1.3)
EGFR: 7 ML/MIN/1.73M2 — LOW
EGFR: 7 ML/MIN/1.73M2 — LOW
GLUCOSE BLDC GLUCOMTR-MCNC: 135 MG/DL — HIGH (ref 70–99)
GLUCOSE BLDC GLUCOMTR-MCNC: 136 MG/DL — HIGH (ref 70–99)
GLUCOSE BLDC GLUCOMTR-MCNC: 142 MG/DL — HIGH (ref 70–99)
GLUCOSE BLDC GLUCOMTR-MCNC: 200 MG/DL — HIGH (ref 70–99)
GLUCOSE SERPL-MCNC: 110 MG/DL — HIGH (ref 70–99)
POTASSIUM SERPL-MCNC: 4.8 MMOL/L — SIGNIFICANT CHANGE UP (ref 3.5–5.3)
POTASSIUM SERPL-SCNC: 4.8 MMOL/L — SIGNIFICANT CHANGE UP (ref 3.5–5.3)
SODIUM SERPL-SCNC: 127 MMOL/L — LOW (ref 135–145)

## 2025-03-22 PROCEDURE — 99233 SBSQ HOSP IP/OBS HIGH 50: CPT

## 2025-03-22 RX ADMIN — Medication 667 MILLIGRAM(S): at 11:48

## 2025-03-22 RX ADMIN — METOPROLOL SUCCINATE 50 MILLIGRAM(S): 50 TABLET, EXTENDED RELEASE ORAL at 08:47

## 2025-03-22 RX ADMIN — INSULIN GLARGINE-YFGN 18 UNIT(S): 100 INJECTION, SOLUTION SUBCUTANEOUS at 21:40

## 2025-03-22 RX ADMIN — SODIUM ZIRCONIUM CYCLOSILICATE 10 GRAM(S): 5 POWDER, FOR SUSPENSION ORAL at 05:36

## 2025-03-22 RX ADMIN — INSULIN LISPRO 0: 100 INJECTION, SOLUTION INTRAVENOUS; SUBCUTANEOUS at 17:17

## 2025-03-22 RX ADMIN — Medication 1 APPLICATION(S): at 11:46

## 2025-03-22 RX ADMIN — INSULIN LISPRO 3 UNIT(S): 100 INJECTION, SOLUTION INTRAVENOUS; SUBCUTANEOUS at 17:17

## 2025-03-22 RX ADMIN — Medication 667 MILLIGRAM(S): at 07:55

## 2025-03-22 RX ADMIN — Medication 100 MILLIGRAM(S): at 11:45

## 2025-03-22 RX ADMIN — Medication 40 MILLIGRAM(S): at 17:17

## 2025-03-22 RX ADMIN — Medication 1 SPRAY(S): at 05:36

## 2025-03-22 RX ADMIN — TIOTROPIUM BROMIDE INHALATION SPRAY 2 PUFF(S): 3.12 SPRAY, METERED RESPIRATORY (INHALATION) at 11:45

## 2025-03-22 RX ADMIN — POLYETHYLENE GLYCOL 3350 17 GRAM(S): 17 POWDER, FOR SOLUTION ORAL at 05:36

## 2025-03-22 RX ADMIN — FOLIC ACID 1 MILLIGRAM(S): 1 TABLET ORAL at 11:46

## 2025-03-22 RX ADMIN — BUMETANIDE 132 MILLIGRAM(S): 1 TABLET ORAL at 06:42

## 2025-03-22 RX ADMIN — POLYETHYLENE GLYCOL 3350 17 GRAM(S): 17 POWDER, FOR SOLUTION ORAL at 17:17

## 2025-03-22 RX ADMIN — ISOSORBDIE DINITRATE 10 MILLIGRAM(S): 30 TABLET ORAL at 05:36

## 2025-03-22 RX ADMIN — INSULIN LISPRO 3 UNIT(S): 100 INJECTION, SOLUTION INTRAVENOUS; SUBCUTANEOUS at 12:31

## 2025-03-22 RX ADMIN — Medication 1 SPRAY(S): at 17:18

## 2025-03-22 RX ADMIN — BUMETANIDE 132 MILLIGRAM(S): 1 TABLET ORAL at 21:41

## 2025-03-22 RX ADMIN — Medication 25 MILLIGRAM(S): at 21:39

## 2025-03-22 RX ADMIN — Medication 1 DOSE(S): at 05:36

## 2025-03-22 RX ADMIN — INSULIN LISPRO 3 UNIT(S): 100 INJECTION, SOLUTION INTRAVENOUS; SUBCUTANEOUS at 08:46

## 2025-03-22 RX ADMIN — ISOSORBDIE DINITRATE 10 MILLIGRAM(S): 30 TABLET ORAL at 17:16

## 2025-03-22 RX ADMIN — TAMSULOSIN HYDROCHLORIDE 0.4 MILLIGRAM(S): 0.4 CAPSULE ORAL at 21:39

## 2025-03-22 RX ADMIN — ISOSORBDIE DINITRATE 10 MILLIGRAM(S): 30 TABLET ORAL at 11:45

## 2025-03-22 RX ADMIN — Medication 40 MILLIGRAM(S): at 05:36

## 2025-03-22 RX ADMIN — Medication 1 DOSE(S): at 17:18

## 2025-03-22 RX ADMIN — Medication 667 MILLIGRAM(S): at 17:21

## 2025-03-22 RX ADMIN — Medication 25 MILLIGRAM(S): at 14:04

## 2025-03-22 RX ADMIN — BUMETANIDE 132 MILLIGRAM(S): 1 TABLET ORAL at 14:03

## 2025-03-22 RX ADMIN — ATORVASTATIN CALCIUM 20 MILLIGRAM(S): 80 TABLET, FILM COATED ORAL at 21:39

## 2025-03-22 RX ADMIN — Medication 1 PACKET(S): at 11:45

## 2025-03-22 RX ADMIN — Medication 25 MILLIGRAM(S): at 05:36

## 2025-03-22 NOTE — PROGRESS NOTE ADULT - PROBLEM SELECTOR PLAN 1
- Sudden drop in Hgb from 7.5 to 5.5 on 3/15 s/p para 3/14. Now stable hb   - worsening DENISHA precludes safe DDAVP use, cont transfusing PRN to goal Hb 8  - CTAP with Moderate volume ascites. Layering hyperdensity within the ascites in the right paracolic gutter, suggestive of hemoperitoneum. Now resolved  - s/p 2 units of pRBC on 3/14, 1 unit prbc 3/16, 3/17  - consider resuming xarelto/aspirin either after shiley (if pt amenable to HD) or in the next 1-2 days

## 2025-03-22 NOTE — PROGRESS NOTE ADULT - SUBJECTIVE AND OBJECTIVE BOX
[facility] NEPHROLOGY SERVICES, [facility]  NEPHROLOGY AND HYPERTENSION  [address]  [address]  [place] [address]  [phone]    MD DANIEL GARRETT MD YELENA ROSENBERG, MD BINNY KOSHY, MD CHRISTOPHER CAPUTO, MD EDWARD BOVER, MD          Patient events noted  no distress      MEDICATIONS  (STANDING):  allopurinol 100 milliGRAM(s) Oral daily  atorvastatin 20 milliGRAM(s) Oral at bedtime  buMETAnide IVPB 4 milliGRAM(s) IV Intermittent every 8 hours  calcium acetate 667 milliGRAM(s) Oral three times a day with meals  chlorhexidine 2% Cloths 1 Application(s) Topical daily  dextrose 5%. 1000 milliLiter(s) (50 mL/Hr) IV Continuous <Continuous>  dextrose 5%. 1000 milliLiter(s) (100 mL/Hr) IV Continuous <Continuous>  dextrose 50% Injectable 25 Gram(s) IV Push once  dextrose 50% Injectable 12.5 Gram(s) IV Push once  dextrose 50% Injectable 25 Gram(s) IV Push once  epoetin lashawn (EPOGEN) Injectable 97192 Unit(s) SubCutaneous every 7 days  fluticasone propionate/ salmeterol 250-50 MICROgram(s) Diskus 1 Dose(s) Inhalation two times a day  folic acid 1 milliGRAM(s) Oral daily  glucagon  Injectable 1 milliGRAM(s) IntraMuscular once  hydrALAZINE 25 milliGRAM(s) Oral three times a day  insulin glargine Injectable (LANTUS) 18 Unit(s) SubCutaneous at bedtime  insulin lispro (ADMELOG) corrective regimen sliding scale   SubCutaneous three times a day before meals  insulin lispro Injectable (ADMELOG) 3 Unit(s) SubCutaneous three times a day before meals  isosorbide   dinitrate Tablet (ISORDIL) 10 milliGRAM(s) Oral three times a day  metoprolol succinate ER 50 milliGRAM(s) Oral daily  pantoprazole    Tablet 40 milliGRAM(s) Oral every 12 hours  polyethylene glycol 3350 17 Gram(s) Oral two times a day  psyllium Powder 1 Packet(s) Oral daily  sodium chloride 0.65% Nasal 1 Spray(s) Both Nostrils two times a day  tamsulosin 0.4 milliGRAM(s) Oral at bedtime  tiotropium 2.5 MICROgram(s) Inhaler 2 Puff(s) Inhalation daily    MEDICATIONS  (PRN):  acetaminophen     Tablet .. 650 milliGRAM(s) Oral every 6 hours PRN Temp greater or equal to 38C (100.4F), Mild Pain (1 - 3)  albuterol/ipratropium for Nebulization 3 milliLiter(s) Nebulizer every 6 hours PRN Shortness of Breath and/or Wheezing  bisacodyl 5 milliGRAM(s) Oral every 12 hours PRN Constipation  dextrose Oral Gel 15 Gram(s) Oral once PRN Blood Glucose LESS THAN 70 milliGRAM(s)/deciliter  dextrose Oral Gel 15 Gram(s) Oral once PRN Blood Glucose LESS THAN 70 milliGRAM(s)/deciliter  melatonin 3 milliGRAM(s) Oral at bedtime PRN Insomnia  ondansetron Injectable 4 milliGRAM(s) IV Push every 6 hours PRN Nausea and/or Vomiting      03-21-25 @ 07:01  -  03-22-25 @ 07:00  --------------------------------------------------------  IN: 220 mL / OUT: 500 mL / NET: -280 mL    03-22-25 @ 07:01  -  03-22-25 @ 16:01  --------------------------------------------------------  IN: 120 mL / OUT: 100 mL / NET: 20 mL      PHYSICAL EXAM:      T(C): 36.3 (03-22-25 @ 12:04), Max: 36.5 (03-21-25 @ 20:28)  HR: 74 (03-22-25 @ 12:04) (69 - 74)  BP: 100/60 (03-22-25 @ 12:04) (100/60 - 145/67)  RR: 18 (03-22-25 @ 12:04) (18 - 18)  SpO2: 97% (03-22-25 @ 12:04) (97% - 99%)  Wt(kg): --  Lungs clear  Heart S1S2  Abd distended   Extremities:   3 edema                                    8.9    15.60 )-----------( 146      ( 21 Mar 2025 07:06 )             29.3     03-22    127[L]  |  80[L]  |  118[H]  ----------------------------<  110[H]  4.8   |  25  |  7.25[H]    Ca    9.0      22 Mar 2025 11:14          Creatinine Trend: 7.25<--, 6.75<--, 5.54<--, 5.22<--, 4.86<--, 3.32<--        Impression:    CM, EF ~ 30%, severe TR  COPD, on home O2  Adm to [facility] VS 1/10/25 w/fluid overload   Tx to [facility] 2/5/25 for HF eval   Known CKD (baseline Cr ~ 2. - 2.5)  Hemodynamic/cardio-renal DENISHA  S/p LVP 3/14 w/4.5L fluid removal, s/p SPA x 4   Dropped Hgb to 5.3 on 3/15, s/p PRBCs   Sharp rise in Cr iso above     Recommendations:     Patient and family have agreed to a trial of hemodialysis   Will initiate Monday      Jon Rowe MD

## 2025-03-22 NOTE — PROGRESS NOTE ADULT - PROBLEM SELECTOR PLAN 3
- Likely DENISHA in setting of cardiorenal syndrome  - Continue monitoring Cr and urine output. Nephrology following, appreciate recs.  - plan for shiley on monday     #Leukocytosis- rising white count over the past couple days; also with abd distension and pain. Ddx includes severe constipation given suboptimal BM vs. less likely SBP from cardiac ascites vs. possible UTI  - UA positive, s/p meropenem, now white count climbing again- consider resuming empirically  - pt with large bm x 2, now constipated again; cont aggressive bowel regimen with suppositories/enema as needed  - f/u dx/tx para given large ascites on imaging, neg for SBP  - cont trending cbc

## 2025-03-22 NOTE — PROGRESS NOTE ADULT - SUBJECTIVE AND OBJECTIVE BOX
Patient is a 79y old  Male who presents with a chief complaint of HF eval (21 Mar 2025 13:43)      SUBJECTIVE / OVERNIGHT EVENTS:  pt appears comfortable, no complaints     MEDICATIONS  (STANDING):  allopurinol 100 milliGRAM(s) Oral daily  atorvastatin 20 milliGRAM(s) Oral at bedtime  buMETAnide IVPB 4 milliGRAM(s) IV Intermittent every 8 hours  calcium acetate 667 milliGRAM(s) Oral three times a day with meals  chlorhexidine 2% Cloths 1 Application(s) Topical daily  dextrose 5%. 1000 milliLiter(s) (50 mL/Hr) IV Continuous <Continuous>  dextrose 5%. 1000 milliLiter(s) (100 mL/Hr) IV Continuous <Continuous>  dextrose 50% Injectable 25 Gram(s) IV Push once  dextrose 50% Injectable 12.5 Gram(s) IV Push once  dextrose 50% Injectable 25 Gram(s) IV Push once  epoetin lashawn (EPOGEN) Injectable 04193 Unit(s) SubCutaneous every 7 days  fluticasone propionate/ salmeterol 250-50 MICROgram(s) Diskus 1 Dose(s) Inhalation two times a day  folic acid 1 milliGRAM(s) Oral daily  glucagon  Injectable 1 milliGRAM(s) IntraMuscular once  hydrALAZINE 25 milliGRAM(s) Oral three times a day  insulin glargine Injectable (LANTUS) 18 Unit(s) SubCutaneous at bedtime  insulin lispro (ADMELOG) corrective regimen sliding scale   SubCutaneous three times a day before meals  insulin lispro Injectable (ADMELOG) 3 Unit(s) SubCutaneous three times a day before meals  isosorbide   dinitrate Tablet (ISORDIL) 10 milliGRAM(s) Oral three times a day  metoprolol succinate ER 50 milliGRAM(s) Oral daily  pantoprazole    Tablet 40 milliGRAM(s) Oral every 12 hours  polyethylene glycol 3350 17 Gram(s) Oral two times a day  psyllium Powder 1 Packet(s) Oral daily  sodium chloride 0.65% Nasal 1 Spray(s) Both Nostrils two times a day  tamsulosin 0.4 milliGRAM(s) Oral at bedtime  tiotropium 2.5 MICROgram(s) Inhaler 2 Puff(s) Inhalation daily    MEDICATIONS  (PRN):  acetaminophen     Tablet .. 650 milliGRAM(s) Oral every 6 hours PRN Temp greater or equal to 38C (100.4F), Mild Pain (1 - 3)  albuterol/ipratropium for Nebulization 3 milliLiter(s) Nebulizer every 6 hours PRN Shortness of Breath and/or Wheezing  bisacodyl 5 milliGRAM(s) Oral every 12 hours PRN Constipation  dextrose Oral Gel 15 Gram(s) Oral once PRN Blood Glucose LESS THAN 70 milliGRAM(s)/deciliter  dextrose Oral Gel 15 Gram(s) Oral once PRN Blood Glucose LESS THAN 70 milliGRAM(s)/deciliter  melatonin 3 milliGRAM(s) Oral at bedtime PRN Insomnia  ondansetron Injectable 4 milliGRAM(s) IV Push every 6 hours PRN Nausea and/or Vomiting        CAPILLARY BLOOD GLUCOSE      POCT Blood Glucose.: 136 mg/dL (22 Mar 2025 12:25)  POCT Blood Glucose.: 142 mg/dL (22 Mar 2025 08:35)  POCT Blood Glucose.: 149 mg/dL (21 Mar 2025 21:28)  POCT Blood Glucose.: 149 mg/dL (21 Mar 2025 17:43)    I&O's Summary    21 Mar 2025 07:01  -  22 Mar 2025 07:00  --------------------------------------------------------  IN: 220 mL / OUT: 500 mL / NET: -280 mL        PHYSICAL EXAM:  GENERAL: NAD, well-developed  HEAD:  Atraumatic, Normocephalic  EYES:  conjunctiva and sclera clear  NECK: Supple, No JVD  CHEST/LUNG: Clear to auscultation bilaterally; No wheeze  HEART: Regular rate and rhythm; No murmurs, rubs, or gallops  ABDOMEN: Soft, Nontender, Nondistended; Bowel sounds present  EXTREMITIES:  2+ Peripheral Pulses, No clubbing, cyanosis, or edema  PSYCH: AAOx3      LABS:                        8.9    15.60 )-----------( 146      ( 21 Mar 2025 07:06 )             29.3     03-22    127[L]  |  80[L]  |  118[H]  ----------------------------<  110[H]  4.8   |  25  |  7.25[H]    Ca    9.0      22 Mar 2025 11:14      PT/INR - ( 21 Mar 2025 15:23 )   PT: 12.0 sec;   INR: 1.04 ratio         PTT - ( 21 Mar 2025 15:23 )  PTT:28.2 sec      Urinalysis Basic - ( 22 Mar 2025 11:14 )    Color: x / Appearance: x / SG: x / pH: x  Gluc: 110 mg/dL / Ketone: x  / Bili: x / Urobili: x   Blood: x / Protein: x / Nitrite: x   Leuk Esterase: x / RBC: x / WBC x   Sq Epi: x / Non Sq Epi: x / Bacteria: x        RADIOLOGY & ADDITIONAL TESTS:    Imaging Personally Reviewed:    Consultant(s) Notes Reviewed:      Care Discussed with Consultants/Other Providers:

## 2025-03-22 NOTE — PROGRESS NOTE ADULT - ASSESSMENT
80 y/o male w/ PMHx CAD s/p CABG, HF (combined HFrEF [EF 25%] and HFpEF [G3DD]) w/ ICD, AFib on Xarelto, severe AS s/p TAVR, COPD on 3-4L home O2, CKD4, HTN, HLD, T2DM, and BPH who presents as a transfer from [facility] for HF evaluation. Ongoing CHF exacerbation now off bumex gtt, new DENISHA on CKD likely 2/2 ongoing cardiorenal and possible component of abdominal compartment syndrome given tremendous ascites, now awaiting decision from wife/pt on possibility of starting HD

## 2025-03-23 LAB
ANION GAP SERPL CALC-SCNC: 25 MMOL/L — HIGH (ref 5–17)
BUN SERPL-MCNC: 117 MG/DL — HIGH (ref 7–23)
CALCIUM SERPL-MCNC: 9 MG/DL — SIGNIFICANT CHANGE UP (ref 8.4–10.5)
CHLORIDE SERPL-SCNC: 79 MMOL/L — LOW (ref 96–108)
CO2 SERPL-SCNC: 22 MMOL/L — SIGNIFICANT CHANGE UP (ref 22–31)
CREAT SERPL-MCNC: 7.47 MG/DL — HIGH (ref 0.5–1.3)
EGFR: 7 ML/MIN/1.73M2 — LOW
EGFR: 7 ML/MIN/1.73M2 — LOW
GLUCOSE BLDC GLUCOMTR-MCNC: 129 MG/DL — HIGH (ref 70–99)
GLUCOSE BLDC GLUCOMTR-MCNC: 142 MG/DL — HIGH (ref 70–99)
GLUCOSE BLDC GLUCOMTR-MCNC: 142 MG/DL — HIGH (ref 70–99)
GLUCOSE BLDC GLUCOMTR-MCNC: 154 MG/DL — HIGH (ref 70–99)
GLUCOSE SERPL-MCNC: 108 MG/DL — HIGH (ref 70–99)
POTASSIUM SERPL-MCNC: 5.1 MMOL/L — SIGNIFICANT CHANGE UP (ref 3.5–5.3)
POTASSIUM SERPL-SCNC: 5.1 MMOL/L — SIGNIFICANT CHANGE UP (ref 3.5–5.3)
SODIUM SERPL-SCNC: 126 MMOL/L — LOW (ref 135–145)

## 2025-03-23 PROCEDURE — 99233 SBSQ HOSP IP/OBS HIGH 50: CPT

## 2025-03-23 RX ADMIN — BUMETANIDE 132 MILLIGRAM(S): 1 TABLET ORAL at 05:31

## 2025-03-23 RX ADMIN — Medication 667 MILLIGRAM(S): at 12:32

## 2025-03-23 RX ADMIN — METOPROLOL SUCCINATE 50 MILLIGRAM(S): 50 TABLET, EXTENDED RELEASE ORAL at 08:45

## 2025-03-23 RX ADMIN — Medication 40 MILLIGRAM(S): at 05:30

## 2025-03-23 RX ADMIN — POLYETHYLENE GLYCOL 3350 17 GRAM(S): 17 POWDER, FOR SOLUTION ORAL at 05:30

## 2025-03-23 RX ADMIN — INSULIN LISPRO 3 UNIT(S): 100 INJECTION, SOLUTION INTRAVENOUS; SUBCUTANEOUS at 08:44

## 2025-03-23 RX ADMIN — INSULIN LISPRO 2: 100 INJECTION, SOLUTION INTRAVENOUS; SUBCUTANEOUS at 17:46

## 2025-03-23 RX ADMIN — Medication 1 PACKET(S): at 11:03

## 2025-03-23 RX ADMIN — INSULIN LISPRO 3 UNIT(S): 100 INJECTION, SOLUTION INTRAVENOUS; SUBCUTANEOUS at 12:37

## 2025-03-23 RX ADMIN — Medication 25 MILLIGRAM(S): at 21:43

## 2025-03-23 RX ADMIN — POLYETHYLENE GLYCOL 3350 17 GRAM(S): 17 POWDER, FOR SOLUTION ORAL at 17:36

## 2025-03-23 RX ADMIN — FOLIC ACID 1 MILLIGRAM(S): 1 TABLET ORAL at 11:04

## 2025-03-23 RX ADMIN — Medication 1 APPLICATION(S): at 11:05

## 2025-03-23 RX ADMIN — Medication 25 MILLIGRAM(S): at 05:30

## 2025-03-23 RX ADMIN — BUMETANIDE 132 MILLIGRAM(S): 1 TABLET ORAL at 14:05

## 2025-03-23 RX ADMIN — Medication 100 MILLIGRAM(S): at 11:05

## 2025-03-23 RX ADMIN — Medication 25 MILLIGRAM(S): at 14:06

## 2025-03-23 RX ADMIN — INSULIN LISPRO 3 UNIT(S): 100 INJECTION, SOLUTION INTRAVENOUS; SUBCUTANEOUS at 17:46

## 2025-03-23 RX ADMIN — ISOSORBDIE DINITRATE 10 MILLIGRAM(S): 30 TABLET ORAL at 11:04

## 2025-03-23 RX ADMIN — Medication 40 MILLIGRAM(S): at 17:35

## 2025-03-23 RX ADMIN — Medication 1 SPRAY(S): at 17:36

## 2025-03-23 RX ADMIN — Medication 1 DOSE(S): at 17:36

## 2025-03-23 RX ADMIN — ISOSORBDIE DINITRATE 10 MILLIGRAM(S): 30 TABLET ORAL at 17:35

## 2025-03-23 RX ADMIN — BUMETANIDE 132 MILLIGRAM(S): 1 TABLET ORAL at 21:44

## 2025-03-23 RX ADMIN — Medication 1 DOSE(S): at 07:41

## 2025-03-23 RX ADMIN — INSULIN GLARGINE-YFGN 18 UNIT(S): 100 INJECTION, SOLUTION SUBCUTANEOUS at 21:43

## 2025-03-23 RX ADMIN — Medication 667 MILLIGRAM(S): at 08:44

## 2025-03-23 RX ADMIN — ATORVASTATIN CALCIUM 20 MILLIGRAM(S): 80 TABLET, FILM COATED ORAL at 21:43

## 2025-03-23 RX ADMIN — TAMSULOSIN HYDROCHLORIDE 0.4 MILLIGRAM(S): 0.4 CAPSULE ORAL at 21:43

## 2025-03-23 RX ADMIN — ISOSORBDIE DINITRATE 10 MILLIGRAM(S): 30 TABLET ORAL at 05:30

## 2025-03-23 RX ADMIN — Medication 1 SPRAY(S): at 07:41

## 2025-03-23 RX ADMIN — TIOTROPIUM BROMIDE INHALATION SPRAY 2 PUFF(S): 3.12 SPRAY, METERED RESPIRATORY (INHALATION) at 11:05

## 2025-03-23 RX ADMIN — Medication 667 MILLIGRAM(S): at 17:35

## 2025-03-23 NOTE — PROGRESS NOTE ADULT - SUBJECTIVE AND OBJECTIVE BOX
Patient is a 79y old  Male who presents with a chief complaint of HF eval (22 Mar 2025 16:00)      SUBJECTIVE / OVERNIGHT EVENTS: pt feels ok, no cp, sob    MEDICATIONS  (STANDING):  allopurinol 100 milliGRAM(s) Oral daily  atorvastatin 20 milliGRAM(s) Oral at bedtime  buMETAnide IVPB 4 milliGRAM(s) IV Intermittent every 8 hours  calcium acetate 667 milliGRAM(s) Oral three times a day with meals  chlorhexidine 2% Cloths 1 Application(s) Topical daily  dextrose 5%. 1000 milliLiter(s) (50 mL/Hr) IV Continuous <Continuous>  dextrose 5%. 1000 milliLiter(s) (100 mL/Hr) IV Continuous <Continuous>  dextrose 50% Injectable 25 Gram(s) IV Push once  dextrose 50% Injectable 12.5 Gram(s) IV Push once  dextrose 50% Injectable 25 Gram(s) IV Push once  epoetin lashawn (EPOGEN) Injectable 46701 Unit(s) SubCutaneous every 7 days  fluticasone propionate/ salmeterol 250-50 MICROgram(s) Diskus 1 Dose(s) Inhalation two times a day  folic acid 1 milliGRAM(s) Oral daily  glucagon  Injectable 1 milliGRAM(s) IntraMuscular once  hydrALAZINE 25 milliGRAM(s) Oral three times a day  insulin glargine Injectable (LANTUS) 18 Unit(s) SubCutaneous at bedtime  insulin lispro (ADMELOG) corrective regimen sliding scale   SubCutaneous three times a day before meals  insulin lispro Injectable (ADMELOG) 3 Unit(s) SubCutaneous three times a day before meals  isosorbide   dinitrate Tablet (ISORDIL) 10 milliGRAM(s) Oral three times a day  metoprolol succinate ER 50 milliGRAM(s) Oral daily  pantoprazole    Tablet 40 milliGRAM(s) Oral every 12 hours  polyethylene glycol 3350 17 Gram(s) Oral two times a day  psyllium Powder 1 Packet(s) Oral daily  sodium chloride 0.65% Nasal 1 Spray(s) Both Nostrils two times a day  tamsulosin 0.4 milliGRAM(s) Oral at bedtime  tiotropium 2.5 MICROgram(s) Inhaler 2 Puff(s) Inhalation daily    MEDICATIONS  (PRN):  acetaminophen     Tablet .. 650 milliGRAM(s) Oral every 6 hours PRN Temp greater or equal to 38C (100.4F), Mild Pain (1 - 3)  albuterol/ipratropium for Nebulization 3 milliLiter(s) Nebulizer every 6 hours PRN Shortness of Breath and/or Wheezing  bisacodyl 5 milliGRAM(s) Oral every 12 hours PRN Constipation  dextrose Oral Gel 15 Gram(s) Oral once PRN Blood Glucose LESS THAN 70 milliGRAM(s)/deciliter  dextrose Oral Gel 15 Gram(s) Oral once PRN Blood Glucose LESS THAN 70 milliGRAM(s)/deciliter  melatonin 3 milliGRAM(s) Oral at bedtime PRN Insomnia  ondansetron Injectable 4 milliGRAM(s) IV Push every 6 hours PRN Nausea and/or Vomiting        CAPILLARY BLOOD GLUCOSE      POCT Blood Glucose.: 142 mg/dL (23 Mar 2025 12:26)  POCT Blood Glucose.: 142 mg/dL (23 Mar 2025 08:33)  POCT Blood Glucose.: 200 mg/dL (22 Mar 2025 21:14)  POCT Blood Glucose.: 135 mg/dL (22 Mar 2025 17:16)    I&O's Summary    22 Mar 2025 07:01  -  23 Mar 2025 07:00  --------------------------------------------------------  IN: 470 mL / OUT: 100 mL / NET: 370 mL    23 Mar 2025 07:01  -  23 Mar 2025 13:17  --------------------------------------------------------  IN: 120 mL / OUT: 0 mL / NET: 120 mL        PHYSICAL EXAM:  CONSTITUTIONAL: NAD  EYES: PERRLA; conjunctiva and sclera clear  NECK: Supple  RESPIRATORY: Normal respiratory effort; CTAB  CARDIOVASCULAR: RRR, no JVD, anasarcic  ABDOMEN: distended  MUSCLOSKELETAL:  no clubbing/cyanosis, no joint swelling or tenderness to palpation  PSYCH: A+O x 2-3, affect normal  NEUROLOGY: CN 2-12 are intact and symmetric; no gross sensory or motor deficits  SKIN: No rashes; no palpable lesions    LABS:    03-23    126[L]  |  79[L]  |  117[H]  ----------------------------<  108[H]  5.1   |  22  |  7.47[H]    Ca    9.0      23 Mar 2025 07:09      PT/INR - ( 21 Mar 2025 15:23 )   PT: 12.0 sec;   INR: 1.04 ratio         PTT - ( 21 Mar 2025 15:23 )  PTT:28.2 sec      Urinalysis Basic - ( 23 Mar 2025 07:09 )    Color: x / Appearance: x / SG: x / pH: x  Gluc: 108 mg/dL / Ketone: x  / Bili: x / Urobili: x   Blood: x / Protein: x / Nitrite: x   Leuk Esterase: x / RBC: x / WBC x   Sq Epi: x / Non Sq Epi: x / Bacteria: x        RADIOLOGY & ADDITIONAL TESTS:    Imaging Personally Reviewed:    Consultant(s) Notes Reviewed:      Care Discussed with Consultants/Other Providers:

## 2025-03-23 NOTE — PROGRESS NOTE ADULT - PROBLEM SELECTOR PLAN 1
- Sudden drop in Hgb from 7.5 to 5.5 on 3/15 s/p para 3/14. Now stable hb   - worsening DENISHA precludes safe DDAVP use, cont transfusing PRN to goal Hb 8  - CTAP with Moderate volume ascites. Layering hyperdensity within the ascites in the right paracolic gutter, suggestive of hemoperitoneum. Now resolved  - s/p 2 units of pRBC on 3/14, 1 unit prbc 3/16, 3/17  - consider resuming xarelto/aspirin after shiley

## 2025-03-23 NOTE — CHART NOTE - NSCHARTNOTEFT_GEN_A_CORE
-Informed by RN that she noted leakage around catheter, in place since 3/10  -Catheter was d/c'd with plan to reinsert it as pt is in urinary retention. RN informed provider that Bucio catheter insertion was unsuccessful   Per RN, pt urinated on his own approx. 300 mL, but retained > 700 mL as seen on bladder scan  -A Coude #16Fr was inserted by provider with no difficulty, balloon inflated to 10 cc per label. Clear yellow urine noted to start draining.   However, urine began to leak around catheter, and an additional 5 cc of water instilled into the balloon.   No further leakage noted at this time.  -Will endorse to day team to monitor

## 2025-03-23 NOTE — PROGRESS NOTE ADULT - ASSESSMENT
80 y/o male w/ PMHx CAD s/p CABG, HF (combined HFrEF [EF 25%] and HFpEF [G3DD]) w/ ICD, AFib on Xarelto, severe AS s/p TAVR, COPD on 3-4L home O2, CKD4, HTN, HLD, T2DM, and BPH who presents as a transfer from [facility] for HF evaluation. Ongoing CHF exacerbation now off bumex gtt, new DENISHA on CKD likely 2/2 ongoing cardiorenal and possible component of abdominal compartment syndrome given tremendous ascites, now plan for shiley/HD

## 2025-03-24 DIAGNOSIS — D72.829 ELEVATED WHITE BLOOD CELL COUNT, UNSPECIFIED: ICD-10-CM

## 2025-03-24 LAB
ANION GAP SERPL CALC-SCNC: 22 MMOL/L — HIGH (ref 5–17)
BLD GP AB SCN SERPL QL: NEGATIVE — SIGNIFICANT CHANGE UP
BUN SERPL-MCNC: 133 MG/DL — HIGH (ref 7–23)
CALCIUM SERPL-MCNC: 8.6 MG/DL — SIGNIFICANT CHANGE UP (ref 8.4–10.5)
CHLORIDE SERPL-SCNC: 82 MMOL/L — LOW (ref 96–108)
CO2 SERPL-SCNC: 24 MMOL/L — SIGNIFICANT CHANGE UP (ref 22–31)
CREAT SERPL-MCNC: 7.99 MG/DL — HIGH (ref 0.5–1.3)
EGFR: 6 ML/MIN/1.73M2 — LOW
EGFR: 6 ML/MIN/1.73M2 — LOW
GLUCOSE BLDC GLUCOMTR-MCNC: 135 MG/DL — HIGH (ref 70–99)
GLUCOSE BLDC GLUCOMTR-MCNC: 137 MG/DL — HIGH (ref 70–99)
GLUCOSE BLDC GLUCOMTR-MCNC: 140 MG/DL — HIGH (ref 70–99)
GLUCOSE BLDC GLUCOMTR-MCNC: 152 MG/DL — HIGH (ref 70–99)
GLUCOSE SERPL-MCNC: 103 MG/DL — HIGH (ref 70–99)
HBV SURFACE AG SER-ACNC: SIGNIFICANT CHANGE UP
HCT VFR BLD CALC: 30.3 % — LOW (ref 39–50)
HGB BLD-MCNC: 9.2 G/DL — LOW (ref 13–17)
INR BLD: 1.04 RATIO — SIGNIFICANT CHANGE UP (ref 0.85–1.16)
MCHC RBC-ENTMCNC: 26.4 PG — LOW (ref 27–34)
MCHC RBC-ENTMCNC: 30.4 G/DL — LOW (ref 32–36)
MCV RBC AUTO: 87.1 FL — SIGNIFICANT CHANGE UP (ref 80–100)
NRBC BLD AUTO-RTO: 0 /100 WBCS — SIGNIFICANT CHANGE UP (ref 0–0)
PLATELET # BLD AUTO: 149 K/UL — LOW (ref 150–400)
POTASSIUM SERPL-MCNC: 4.7 MMOL/L — SIGNIFICANT CHANGE UP (ref 3.5–5.3)
POTASSIUM SERPL-SCNC: 4.7 MMOL/L — SIGNIFICANT CHANGE UP (ref 3.5–5.3)
PROTHROM AB SERPL-ACNC: 11.8 SEC — SIGNIFICANT CHANGE UP (ref 9.9–13.4)
RBC # BLD: 3.48 M/UL — LOW (ref 4.2–5.8)
RBC # FLD: 17.1 % — HIGH (ref 10.3–14.5)
RH IG SCN BLD-IMP: POSITIVE — SIGNIFICANT CHANGE UP
SODIUM SERPL-SCNC: 128 MMOL/L — LOW (ref 135–145)
WBC # BLD: 15.92 K/UL — HIGH (ref 3.8–10.5)
WBC # FLD AUTO: 15.92 K/UL — HIGH (ref 3.8–10.5)

## 2025-03-24 PROCEDURE — 76937 US GUIDE VASCULAR ACCESS: CPT | Mod: 26

## 2025-03-24 PROCEDURE — 36556 INSERT NON-TUNNEL CV CATH: CPT

## 2025-03-24 PROCEDURE — 99233 SBSQ HOSP IP/OBS HIGH 50: CPT

## 2025-03-24 PROCEDURE — 77001 FLUOROGUIDE FOR VEIN DEVICE: CPT | Mod: 26

## 2025-03-24 RX ADMIN — Medication 1 SPRAY(S): at 06:15

## 2025-03-24 RX ADMIN — TAMSULOSIN HYDROCHLORIDE 0.4 MILLIGRAM(S): 0.4 CAPSULE ORAL at 23:10

## 2025-03-24 RX ADMIN — Medication 25 MILLIGRAM(S): at 06:05

## 2025-03-24 RX ADMIN — Medication 40 MILLIGRAM(S): at 06:05

## 2025-03-24 RX ADMIN — ATORVASTATIN CALCIUM 20 MILLIGRAM(S): 80 TABLET, FILM COATED ORAL at 23:10

## 2025-03-24 RX ADMIN — BUMETANIDE 132 MILLIGRAM(S): 1 TABLET ORAL at 05:53

## 2025-03-24 RX ADMIN — INSULIN LISPRO 3 UNIT(S): 100 INJECTION, SOLUTION INTRAVENOUS; SUBCUTANEOUS at 17:30

## 2025-03-24 RX ADMIN — Medication 1 DOSE(S): at 17:32

## 2025-03-24 RX ADMIN — Medication 1 DOSE(S): at 06:16

## 2025-03-24 RX ADMIN — Medication 1 APPLICATION(S): at 12:25

## 2025-03-24 RX ADMIN — INSULIN GLARGINE-YFGN 18 UNIT(S): 100 INJECTION, SOLUTION SUBCUTANEOUS at 23:09

## 2025-03-24 RX ADMIN — Medication 1 SPRAY(S): at 17:32

## 2025-03-24 RX ADMIN — Medication 667 MILLIGRAM(S): at 17:33

## 2025-03-24 RX ADMIN — ISOSORBDIE DINITRATE 10 MILLIGRAM(S): 30 TABLET ORAL at 23:10

## 2025-03-24 RX ADMIN — Medication 100 MILLIGRAM(S): at 12:24

## 2025-03-24 RX ADMIN — FOLIC ACID 1 MILLIGRAM(S): 1 TABLET ORAL at 12:24

## 2025-03-24 RX ADMIN — TIOTROPIUM BROMIDE INHALATION SPRAY 2 PUFF(S): 3.12 SPRAY, METERED RESPIRATORY (INHALATION) at 14:58

## 2025-03-24 RX ADMIN — Medication 40 MILLIGRAM(S): at 17:32

## 2025-03-24 RX ADMIN — INSULIN LISPRO 2: 100 INJECTION, SOLUTION INTRAVENOUS; SUBCUTANEOUS at 17:28

## 2025-03-24 RX ADMIN — Medication 25 MILLIGRAM(S): at 14:57

## 2025-03-24 RX ADMIN — ISOSORBDIE DINITRATE 10 MILLIGRAM(S): 30 TABLET ORAL at 14:57

## 2025-03-24 RX ADMIN — BUMETANIDE 132 MILLIGRAM(S): 1 TABLET ORAL at 14:57

## 2025-03-24 RX ADMIN — ISOSORBDIE DINITRATE 10 MILLIGRAM(S): 30 TABLET ORAL at 06:05

## 2025-03-24 RX ADMIN — METOPROLOL SUCCINATE 50 MILLIGRAM(S): 50 TABLET, EXTENDED RELEASE ORAL at 09:37

## 2025-03-24 NOTE — PROGRESS NOTE ADULT - ASSESSMENT
80 y/o male w/ PMHx CAD s/p CABG, HF (combined HFrEF [EF 25%] and HFpEF [G3DD]) w/ ICD, AFib on Xarelto, severe AS s/p TAVR, COPD on 3-4L home O2, CKD4, HTN, HLD, T2DM, and BPH who presents as a transfer from [facility] for HF evaluation. Ongoing CHF exacerbation now off bumex gtt, new DENISHA on CKD likely 2/2 ongoing cardiorenal and possible component of abdominal compartment syndrome given tremendous ascites, now plan for shiley/HD

## 2025-03-24 NOTE — PROCEDURE NOTE - ADDITIONAL PROCEDURE DETAILS
Tip located in SVC  OK for immediate use
Indication for interrogation: ?arrhythmia    Presenting rhythm: AF/BiV pacing    Measured data WNL, normal CRT-D function, dependent    Stored data revealed no new episodes, RV auto capture testing noted at 2/7 00:35    LV output increased to 3.5V @ 1.0 ms    : 94%, AP: 19%

## 2025-03-24 NOTE — PROGRESS NOTE ADULT - SUBJECTIVE AND OBJECTIVE BOX
Patient is a 79y old  Male who presents with a chief complaint of HF eval (23 Mar 2025 13:16)      SUBJECTIVE / OVERNIGHT EVENTS: no acute events overnight, wife at bedside     MEDICATIONS  (STANDING):  allopurinol 100 milliGRAM(s) Oral daily  atorvastatin 20 milliGRAM(s) Oral at bedtime  buMETAnide IVPB 4 milliGRAM(s) IV Intermittent every 8 hours  calcium acetate 667 milliGRAM(s) Oral three times a day with meals  chlorhexidine 2% Cloths 1 Application(s) Topical daily  dextrose 5%. 1000 milliLiter(s) (100 mL/Hr) IV Continuous <Continuous>  dextrose 5%. 1000 milliLiter(s) (50 mL/Hr) IV Continuous <Continuous>  dextrose 50% Injectable 25 Gram(s) IV Push once  dextrose 50% Injectable 12.5 Gram(s) IV Push once  dextrose 50% Injectable 25 Gram(s) IV Push once  epoetin lashawn (EPOGEN) Injectable 15826 Unit(s) SubCutaneous every 7 days  fluticasone propionate/ salmeterol 250-50 MICROgram(s) Diskus 1 Dose(s) Inhalation two times a day  folic acid 1 milliGRAM(s) Oral daily  glucagon  Injectable 1 milliGRAM(s) IntraMuscular once  hydrALAZINE 25 milliGRAM(s) Oral three times a day  insulin glargine Injectable (LANTUS) 18 Unit(s) SubCutaneous at bedtime  insulin lispro (ADMELOG) corrective regimen sliding scale   SubCutaneous three times a day before meals  insulin lispro Injectable (ADMELOG) 3 Unit(s) SubCutaneous three times a day before meals  isosorbide   dinitrate Tablet (ISORDIL) 10 milliGRAM(s) Oral three times a day  metoprolol succinate ER 50 milliGRAM(s) Oral daily  pantoprazole    Tablet 40 milliGRAM(s) Oral every 12 hours  polyethylene glycol 3350 17 Gram(s) Oral two times a day  psyllium Powder 1 Packet(s) Oral daily  sodium chloride 0.65% Nasal 1 Spray(s) Both Nostrils two times a day  tamsulosin 0.4 milliGRAM(s) Oral at bedtime  tiotropium 2.5 MICROgram(s) Inhaler 2 Puff(s) Inhalation daily    MEDICATIONS  (PRN):  acetaminophen     Tablet .. 650 milliGRAM(s) Oral every 6 hours PRN Temp greater or equal to 38C (100.4F), Mild Pain (1 - 3)  albuterol/ipratropium for Nebulization 3 milliLiter(s) Nebulizer every 6 hours PRN Shortness of Breath and/or Wheezing  bisacodyl 5 milliGRAM(s) Oral every 12 hours PRN Constipation  dextrose Oral Gel 15 Gram(s) Oral once PRN Blood Glucose LESS THAN 70 milliGRAM(s)/deciliter  dextrose Oral Gel 15 Gram(s) Oral once PRN Blood Glucose LESS THAN 70 milliGRAM(s)/deciliter  melatonin 3 milliGRAM(s) Oral at bedtime PRN Insomnia  ondansetron Injectable 4 milliGRAM(s) IV Push every 6 hours PRN Nausea and/or Vomiting        CAPILLARY BLOOD GLUCOSE      POCT Blood Glucose.: 137 mg/dL (24 Mar 2025 12:37)  POCT Blood Glucose.: 135 mg/dL (24 Mar 2025 08:54)  POCT Blood Glucose.: 129 mg/dL (23 Mar 2025 21:09)  POCT Blood Glucose.: 154 mg/dL (23 Mar 2025 17:43)    I&O's Summary    23 Mar 2025 07:01  -  24 Mar 2025 07:00  --------------------------------------------------------  IN: 460 mL / OUT: 800 mL / NET: -340 mL        PHYSICAL EXAM:  CONSTITUTIONAL: NAD  EYES: PERRLA; conjunctiva and sclera clear  NECK: Supple  RESPIRATORY: Normal respiratory effort; CTAB  CARDIOVASCULAR: RRR, S1S2  ABDOMEN: distended  MUSCLOSKELETAL:  anasarca  PSYCH: A+O x 2-3, affect normal  NEUROLOGY: CN 2-12 are intact and symmetric; no gross sensory or motor deficits  SKIN: No rashes; no palpable lesions    LABS:                        9.2    15.92 )-----------( 149      ( 24 Mar 2025 07:24 )             30.3     03-24    128[L]  |  82[L]  |  133[H]  ----------------------------<  103[H]  4.7   |  24  |  7.99[H]    Ca    8.6      24 Mar 2025 07:24      PT/INR - ( 24 Mar 2025 07:24 )   PT: 11.8 sec;   INR: 1.04 ratio               Urinalysis Basic - ( 24 Mar 2025 07:24 )    Color: x / Appearance: x / SG: x / pH: x  Gluc: 103 mg/dL / Ketone: x  / Bili: x / Urobili: x   Blood: x / Protein: x / Nitrite: x   Leuk Esterase: x / RBC: x / WBC x   Sq Epi: x / Non Sq Epi: x / Bacteria: x        RADIOLOGY & ADDITIONAL TESTS:    Imaging Personally Reviewed:    Consultant(s) Notes Reviewed:      Care Discussed with Consultants/Other Providers:

## 2025-03-24 NOTE — PRE PROCEDURE NOTE - PRE PROCEDURE EVALUATION
Interventional Radiology    HPI: 78 y/o male w/ PMHx CAD s/p CABG, HF (combined HFrEF [EF 25%] and HFpEF [G3DD]) w/ ICD, AFib on Xarelto, severe AS s/p TAVR, COPD on 3-4L home O2, CKD4, HTN, HLD, T2DM, and BPH who presents as a transfer from [facility] for HF evaluation. Ongoing CHF exacerbation now off bumex gtt, new DENISHA on CKD likely 2/2 ongoing cardiorenal and possible component of abdominal compartment syndrome given tremendous ascites, now patient presents to IR for non tunneled HD catheter placement.     Allergies: No Known Allergies    Medications (Abx/Cardiac/Anticoagulation/Blood Products)  buMETAnide IVPB: 132 mL/Hr IV Intermittent (03-24 @ 05:53)  hydrALAZINE: 25 milliGRAM(s) Oral (03-24 @ 06:05)  isosorbide   dinitrate Tablet (ISORDIL): 10 milliGRAM(s) Oral (03-24 @ 06:05)  metoprolol succinate ER: 50 milliGRAM(s) Oral (03-24 @ 09:37)    Data:  107  T(C): 36.5  HR: 70  BP: 110/63  RR: 18  SpO2: 96%    Exam  General: No acute distress  Chest: Non labored breathing    -WBC 15.92 / HgB 9.2 / Hct 30.3 / Plt 149  -Na 128 / Cl 82 /  / Glucose 103  -K 4.7 / CO2 24 / Cr 7.99  -ALT -- / Alk Phos -- / T.Bili --  -INR1.04    Imaging:     Plan: 79y Male presents for non tunneled HD catheter placement   -Risks/Benefits/alternatives explained with the patient and/or healthcare proxy and witnessed informed consent obtained.

## 2025-03-24 NOTE — PROGRESS NOTE ADULT - SUBJECTIVE AND OBJECTIVE BOX
Comfortable on NC O2    Vital Signs Last 24 Hrs  T(C): 36.5 (03-24-25 @ 13:02), Max: 36.7 (03-23-25 @ 20:36)  T(F): 97.7 (03-24-25 @ 13:02), Max: 98 (03-23-25 @ 20:36)  HR: 70 (03-24-25 @ 13:02) (70 - 72)  BP: 110/63 (03-24-25 @ 13:02) (103/66 - 128/72)  RR: 18 (03-24-25 @ 13:02) (18 - 18)  SpO2: 96% (03-24-25 @ 13:02) (96% - 99%)    I&O's Detail    23 Mar 2025 07:01  -  24 Mar 2025 07:00  --------------------------------------------------------  IN:    Oral Fluid: 460 mL  Total IN: 460 mL    OUT:    Indwelling Catheter - Urethral (mL): 800 mL  Total OUT: 800 mL    s1s2  b/l air entry  soft, ascites   edema, chronic, unchanged                                                                                                                                              9.2    15.92 )-----------( 149      ( 24 Mar 2025 07:24 )             30.3     24 Mar 2025 07:24    128    |  82     |  133    ----------------------------<  103    4.7     |  24     |  7.99     Ca    8.6        24 Mar 2025 07:24    PT/INR - ( 24 Mar 2025 07:24 )   PT: 11.8 sec;   INR: 1.04 ratio      acetaminophen     Tablet .. 650 milliGRAM(s) Oral every 6 hours PRN  albuterol/ipratropium for Nebulization 3 milliLiter(s) Nebulizer every 6 hours PRN  allopurinol 100 milliGRAM(s) Oral daily  atorvastatin 20 milliGRAM(s) Oral at bedtime  bisacodyl 5 milliGRAM(s) Oral every 12 hours PRN  buMETAnide IVPB 4 milliGRAM(s) IV Intermittent every 8 hours  calcium acetate 667 milliGRAM(s) Oral three times a day with meals  chlorhexidine 2% Cloths 1 Application(s) Topical daily  chlorhexidine 4% Liquid 1 Application(s) Topical <User Schedule>  dextrose 5%. 1000 milliLiter(s) IV Continuous <Continuous>  dextrose 5%. 1000 milliLiter(s) IV Continuous <Continuous>  dextrose 50% Injectable 25 Gram(s) IV Push once  dextrose 50% Injectable 12.5 Gram(s) IV Push once  dextrose 50% Injectable 25 Gram(s) IV Push once  dextrose Oral Gel 15 Gram(s) Oral once PRN  dextrose Oral Gel 15 Gram(s) Oral once PRN  epoetin lashawn (EPOGEN) Injectable 28856 Unit(s) SubCutaneous every 7 days  fluticasone propionate/ salmeterol 250-50 MICROgram(s) Diskus 1 Dose(s) Inhalation two times a day  folic acid 1 milliGRAM(s) Oral daily  glucagon  Injectable 1 milliGRAM(s) IntraMuscular once  hydrALAZINE 25 milliGRAM(s) Oral three times a day  insulin glargine Injectable (LANTUS) 18 Unit(s) SubCutaneous at bedtime  insulin lispro (ADMELOG) corrective regimen sliding scale   SubCutaneous three times a day before meals  insulin lispro Injectable (ADMELOG) 3 Unit(s) SubCutaneous three times a day before meals  isosorbide   dinitrate Tablet (ISORDIL) 10 milliGRAM(s) Oral three times a day  melatonin 3 milliGRAM(s) Oral at bedtime PRN  metoprolol succinate ER 50 milliGRAM(s) Oral daily  ondansetron Injectable 4 milliGRAM(s) IV Push every 6 hours PRN  pantoprazole    Tablet 40 milliGRAM(s) Oral every 12 hours  polyethylene glycol 3350 17 Gram(s) Oral two times a day  psyllium Powder 1 Packet(s) Oral daily  sodium chloride 0.65% Nasal 1 Spray(s) Both Nostrils two times a day  sodium chloride 0.9% lock flush 10 milliLiter(s) IV Push every 1 hour PRN  tamsulosin 0.4 milliGRAM(s) Oral at bedtime  tiotropium 2.5 MICROgram(s) Inhaler 2 Puff(s) Inhalation daily    A/P:    COPD, on home O2  CM, EF ~ 30%, severe TR  Adm to [facility] 1/10/25 w/fluid overload   Tx to [facility] 2/5/25 for HF eval    S/p LVP 3/14 w/4.5L fluid removal, s/p SPA x 4   Dropped Hgb to 5.3 on 3/15, s/p PRBCs   Hemodynamic/cardio-renal DENISHA/CKD   Poor UO on IV Bumex  Pt is now agreeable to start HD  S/p temp HD cath today  HD today and tomorrow as ordered  TMP as tolerates   Pls arrange for perm cath later this week  Pt also needs arrangements for CHIARA w/HD     [phone]

## 2025-03-24 NOTE — PROGRESS NOTE ADULT - PROBLEM SELECTOR PLAN 1
- Likely DENISHA in setting of cardiorenal syndrome  - Continue monitoring Cr and urine output.   - plan for shiyennifer on monday  - renal follow up

## 2025-03-24 NOTE — CHART NOTE - NSCHARTNOTEFT_GEN_A_CORE
NUTRITION FOLLOW UP NOTE    PATIENT SEEN FOR: f/u on 3 dsu    SOURCE: [] Patient  [x] Current Medical Record  [] RN  [x] Family/support person at bedside  [] Patient unavailable/inappropriate  [] Other:    CHART REVIEWED/EVENTS NOTED.  [] No changes to nutrition care plan to note  [] Nutrition Status:    DIET ORDER:   Diet, NPO after Midnight:      NPO Start Date: 23-Mar-2025,   NPO Start Time: 23:59 (25)  Diet, Regular:   Consistent Carbohydrate {No Snacks} (CSTCHO)  DASH/TLC {Sodium & Cholesterol Restricted} (DASH)  1000mL Fluid Restriction ([id number])  No Concentrated Phosphorus  Supplement Feeding Modality:  Oral  Nepro Cans or Servings Per Day:  1       Frequency:  Daily (25)      CURRENT DIET ORDER IS:  [] Appropriate:  [x] Inadequate: pt currently NPO for possible IR Shiley for possible HD  [] Other:    NUTRITION INTAKE/PROVISION:  [x] PO: NPO at this time  [] Enteral Nutrition:  [] Parenteral Nutrition:    ANTHROPOMETRICS:  Drug Dosing Weight  Height (cm): 177.8 (10 Tay 2025 15:59)  Weight (kg): 107.501 (2025 09:08)  BMI (kg/m2): 34 (2025 09:08)  BSA (m2): 2.24 (2025 09:08)  Weights:   Daily Weight in k.3 (-), Weight in k.2 (-), Weight in k.1 (-), Weight in k.3 (-), Weight in k.4 (-), Weight in k.7 (-), Weight in k.5 (-18)     NUTRITIONALLY PERTINENT MEDICATIONS:  MEDICATIONS  (STANDING):  allopurinol  atorvastatin  buMETAnide IVPB  calcium acetate  dextrose 5%.  dextrose 5%.  dextrose 50% Injectable  dextrose 50% Injectable  dextrose 50% Injectable  folic acid  glucagon  Injectable  hydrALAZINE  insulin glargine Injectable (LANTUS)  insulin lispro (ADMELOG) corrective regimen sliding scale  insulin lispro Injectable (ADMELOG)  isosorbide   dinitrate Tablet (ISORDIL)  metoprolol succinate ER  pantoprazole    Tablet  polyethylene glycol 3350  psyllium Powder       NUTRITIONALLY PERTINENT LABS:   Na128 mmol/L[L] Glu 103 mg/dL[H] K+ 4.7 mmol/L Cr  7.99 mg/dL[H]  mg/dL[H]  Phos 6.8 mg/dL[H]  Alb 3.2 g/dL[L] ALT 11 U/L AST 37 U/L Alkaline Phosphatase 231 U/L[H]    A1C with Estimated Average Glucose Result: 7.2 % (25 @ 07:55)          Finger Sticks:  POCT Blood Glucose.: 135 mg/dL ( @ 08:54)  POCT Blood Glucose.: 129 mg/dL ( @ 21:09)  POCT Blood Glucose.: 154 mg/dL ( @ 17:43)  POCT Blood Glucose.: 142 mg/dL ( @ 12:26)      NUTRITIONALLY PERTINENT MEDICATIONS/LABS:  [x] Reviewed  [] Relevant notes on medications/labs: elevated phosphorous, low potassium, elevated A1c    EDEMA:  [x] Reviewed  [x] Relevant notes: +3 generalized, +4 scrotum    GI/ I&O:  [x] Reviewed  [] Relevant notes:  [x] Other: last BM 3/21    SKIN:   [x] No pressure injuries documented, per nursing flowsheet  [] Pressure injury previously noted  [] Change in pressure injury documentation:  [] Other:    ESTIMATED NEEDS:  [x] No change:  [] Updated:  Energy: 1552-9269 kcal/day (25-30 kcal/kg)  Protein: 78-93 g/day (1-1.2 g/kg)  Fluid:   ml/day or [x] defer to team  Based on: IBW 77.5 kg on       NUTRITION DIAGNOSIS:  [x] Prior Dx: Food & Nutrition Related Knowledge Deficit, Inadequate Protein Energy Intake  [] New Dx:    EDUCATION:  [x] Yes: HD diet to spouse  [] Not appropriate/warranted    NUTRITION CARE PLAN:  1. Diet: reinitiate po diet when medically appropriate  2. Supplements: changed Nepro to berry flavor a per spouse request, caitlyn flavor supplements not appropriate due to elevated phosphorous  3. Multivitamin/mineral supplementation:   4:     [] Achieved - Continue current nutrition intervention(s)  [] Current medical condition precludes nutrition intervention at this time.    MONITORING AND EVALUATION:   RD remains available upon request and will follow up per protocol.    Name Sherron Farmer MA, PRAMOD, CDN/TEAMS   Available on [place] TEAMS

## 2025-03-24 NOTE — PROGRESS NOTE ADULT - PROBLEM SELECTOR PLAN 3
- EF 30% with severe tricuspid regurg and PASP 55mmHG on 2/7/24. Dry wt is 243 lbs.   - c/w bumex 4mg iv TID   - GDMT:  Metoprolol succinate 50mg day. Holding entresto and aldactone for DENISHA. Not on SGLT2.  - Hydralazine/Nitrate: hydral 50mg TID. Isordil 20mg TID   - Cardiology following, patient declined RHC and cardiomems  - per GOC with wife; pt dnr/dni but they wish to keep ICD active  - strict i/o- ro placed  - HF re-eval pending

## 2025-03-24 NOTE — PATIENT PROFILE ADULT - BILL PAYMENT
Subjective   Anisha Jacobson Barbara Clarke MD;  Location: [facility];  no

## 2025-03-25 LAB
ANION GAP SERPL CALC-SCNC: 18 MMOL/L — HIGH (ref 5–17)
BUN SERPL-MCNC: 101 MG/DL — HIGH (ref 7–23)
CALCIUM SERPL-MCNC: 8.6 MG/DL — SIGNIFICANT CHANGE UP (ref 8.4–10.5)
CHLORIDE SERPL-SCNC: 85 MMOL/L — LOW (ref 96–108)
CO2 SERPL-SCNC: 28 MMOL/L — SIGNIFICANT CHANGE UP (ref 22–31)
CREAT SERPL-MCNC: 6.75 MG/DL — HIGH (ref 0.5–1.3)
EGFR: 8 ML/MIN/1.73M2 — LOW
EGFR: 8 ML/MIN/1.73M2 — LOW
GLUCOSE BLDC GLUCOMTR-MCNC: 113 MG/DL — HIGH (ref 70–99)
GLUCOSE BLDC GLUCOMTR-MCNC: 120 MG/DL — HIGH (ref 70–99)
GLUCOSE BLDC GLUCOMTR-MCNC: 139 MG/DL — HIGH (ref 70–99)
GLUCOSE BLDC GLUCOMTR-MCNC: 147 MG/DL — HIGH (ref 70–99)
GLUCOSE BLDC GLUCOMTR-MCNC: 154 MG/DL — HIGH (ref 70–99)
GLUCOSE SERPL-MCNC: 111 MG/DL — HIGH (ref 70–99)
HCT VFR BLD CALC: 31.3 % — LOW (ref 39–50)
HGB BLD-MCNC: 9.4 G/DL — LOW (ref 13–17)
MCHC RBC-ENTMCNC: 26.8 PG — LOW (ref 27–34)
MCHC RBC-ENTMCNC: 30 G/DL — LOW (ref 32–36)
MCV RBC AUTO: 89.2 FL — SIGNIFICANT CHANGE UP (ref 80–100)
NRBC BLD AUTO-RTO: 0 /100 WBCS — SIGNIFICANT CHANGE UP (ref 0–0)
PLATELET # BLD AUTO: 147 K/UL — LOW (ref 150–400)
POTASSIUM SERPL-MCNC: 4.2 MMOL/L — SIGNIFICANT CHANGE UP (ref 3.5–5.3)
POTASSIUM SERPL-SCNC: 4.2 MMOL/L — SIGNIFICANT CHANGE UP (ref 3.5–5.3)
RBC # BLD: 3.51 M/UL — LOW (ref 4.2–5.8)
RBC # FLD: 16.8 % — HIGH (ref 10.3–14.5)
SARS-COV-2 RNA SPEC QL NAA+PROBE: SIGNIFICANT CHANGE UP
SODIUM SERPL-SCNC: 131 MMOL/L — LOW (ref 135–145)
WBC # BLD: 14.86 K/UL — HIGH (ref 3.8–10.5)
WBC # FLD AUTO: 14.86 K/UL — HIGH (ref 3.8–10.5)

## 2025-03-25 PROCEDURE — 99223 1ST HOSP IP/OBS HIGH 75: CPT

## 2025-03-25 PROCEDURE — 99233 SBSQ HOSP IP/OBS HIGH 50: CPT

## 2025-03-25 PROCEDURE — G0545: CPT

## 2025-03-25 PROCEDURE — 51700 IRRIGATION OF BLADDER: CPT

## 2025-03-25 RX ORDER — EPOETIN ALFA 10000 [IU]/ML
10000 SOLUTION INTRAVENOUS; SUBCUTANEOUS
Refills: 0 | Status: DISCONTINUED | OUTPATIENT
Start: 2025-03-25 | End: 2025-04-14

## 2025-03-25 RX ADMIN — Medication 40 MILLIGRAM(S): at 17:13

## 2025-03-25 RX ADMIN — Medication 1 SPRAY(S): at 17:14

## 2025-03-25 RX ADMIN — INSULIN LISPRO 3 UNIT(S): 100 INJECTION, SOLUTION INTRAVENOUS; SUBCUTANEOUS at 12:45

## 2025-03-25 RX ADMIN — Medication 40 MILLIGRAM(S): at 05:42

## 2025-03-25 RX ADMIN — Medication 1 SPRAY(S): at 05:43

## 2025-03-25 RX ADMIN — POLYETHYLENE GLYCOL 3350 17 GRAM(S): 17 POWDER, FOR SOLUTION ORAL at 17:13

## 2025-03-25 RX ADMIN — POLYETHYLENE GLYCOL 3350 17 GRAM(S): 17 POWDER, FOR SOLUTION ORAL at 05:44

## 2025-03-25 RX ADMIN — TAMSULOSIN HYDROCHLORIDE 0.4 MILLIGRAM(S): 0.4 CAPSULE ORAL at 21:41

## 2025-03-25 RX ADMIN — Medication 1 APPLICATION(S): at 11:36

## 2025-03-25 RX ADMIN — METOPROLOL SUCCINATE 50 MILLIGRAM(S): 50 TABLET, EXTENDED RELEASE ORAL at 11:34

## 2025-03-25 RX ADMIN — INSULIN GLARGINE-YFGN 18 UNIT(S): 100 INJECTION, SOLUTION SUBCUTANEOUS at 21:44

## 2025-03-25 RX ADMIN — ISOSORBDIE DINITRATE 10 MILLIGRAM(S): 30 TABLET ORAL at 13:57

## 2025-03-25 RX ADMIN — Medication 1 DOSE(S): at 17:13

## 2025-03-25 RX ADMIN — INSULIN LISPRO 3 UNIT(S): 100 INJECTION, SOLUTION INTRAVENOUS; SUBCUTANEOUS at 17:47

## 2025-03-25 RX ADMIN — Medication 10 MILLIGRAM(S): at 21:41

## 2025-03-25 RX ADMIN — TIOTROPIUM BROMIDE INHALATION SPRAY 2 PUFF(S): 3.12 SPRAY, METERED RESPIRATORY (INHALATION) at 11:35

## 2025-03-25 RX ADMIN — Medication 1 DOSE(S): at 05:43

## 2025-03-25 RX ADMIN — Medication 667 MILLIGRAM(S): at 17:47

## 2025-03-25 RX ADMIN — ISOSORBDIE DINITRATE 10 MILLIGRAM(S): 30 TABLET ORAL at 21:41

## 2025-03-25 RX ADMIN — FOLIC ACID 1 MILLIGRAM(S): 1 TABLET ORAL at 11:35

## 2025-03-25 RX ADMIN — Medication 1 PACKET(S): at 11:34

## 2025-03-25 RX ADMIN — Medication 100 MILLIGRAM(S): at 11:34

## 2025-03-25 RX ADMIN — ATORVASTATIN CALCIUM 20 MILLIGRAM(S): 80 TABLET, FILM COATED ORAL at 21:41

## 2025-03-25 RX ADMIN — Medication 667 MILLIGRAM(S): at 11:33

## 2025-03-25 RX ADMIN — Medication 650 MILLIGRAM(S): at 02:36

## 2025-03-25 RX ADMIN — INSULIN LISPRO 2: 100 INJECTION, SOLUTION INTRAVENOUS; SUBCUTANEOUS at 12:46

## 2025-03-25 RX ADMIN — Medication 650 MILLIGRAM(S): at 03:06

## 2025-03-25 NOTE — PROGRESS NOTE ADULT - ASSESSMENT
80 y/o male w/ PMHx CAD s/p CABG, HF (combined HFrEF [EF 25%] and HFpEF [G3DD]) w/ ICD, AFib on Xarelto, severe AS s/p TAVR, COPD on 3-4L home O2, CKD4, HTN, HLD, T2DM, and BPH who presents as a transfer from [facility] for HF evaluation. Ongoing CHF exacerbation now off bumex gtt, new DENISHA on CKD likely 2/2 ongoing cardiorenal and possible component of abdominal compartment syndrome given tremendous ascites, now plan for shiley/HD

## 2025-03-25 NOTE — CONSULT NOTE ADULT - ASSESSMENT
79-year-old male with a past medical history significant for CAD status post CABG, heart failure, ICD placement, severe aortic stenosis status post TAVR procedure, COPD on home oxygen at 3 to 4 L at baseline, hypertension, hyperlipidemia, diabetes who was transferred from [facility] for heart failure exacerbation.    #Leukocytosis  #Plan for tunneled dialysis catheter  #Positive urine culture, Staph aureus on 3/12/2025  #Presence of cardiac hardware, ICD  #Presence of TAVR  #Ascites    Recommendations  Would monitor off antibiotics at this time  Would obtain surveillance blood cultures given previous Staph aureus growth in urine,  Staph could be from ro itself however when growth detected in urine - infection may be elsewhere-  patient with persistent leukocytosis and multiple potential sources of infection including ICD, TAVR  Follow fever curve and WBC count    Iván Dash MD  Division of Infectious Diseases

## 2025-03-25 NOTE — PROGRESS NOTE ADULT - PROBLEM SELECTOR PLAN 1
- Likely DENISHA in setting of cardiorenal syndrome  - Continue monitoring Cr and urine output.   - S/p jackie 3/24  - plan for permacath thursday   - renal follow up

## 2025-03-25 NOTE — PROGRESS NOTE ADULT - SUBJECTIVE AND OBJECTIVE BOX
Seen on HD, 2nd HD today     Vital Signs Last 24 Hrs  T(C): 36.3 (03-25-25 @ 11:28), Max: 36.6 (03-24-25 @ 23:26)  T(F): 97.3 (03-25-25 @ 11:28), Max: 97.9 (03-24-25 @ 23:26)  HR: 70 (03-25-25 @ 11:28) (70 - 75)  BP: 104/65 (03-25-25 @ 11:28) (102/67 - 128/74)  RR: 18 (03-25-25 @ 11:28) (18 - 20)  SpO2: 99% (03-25-25 @ 11:28) (93% - 99%)    I&O's Detail    24 Mar 2025 07:01  -  25 Mar 2025 07:00  --------------------------------------------------------  IN:    Oral Fluid: 600 mL  Total IN: 600 mL    OUT:    Indwelling Catheter - Urethral (mL): 300 mL    Other (mL): 500 mL  Total OUT: 800 mL    25 Mar 2025 07:01  -  25 Mar 2025 11:46  --------------------------------------------------------  OUT:    Other (mL): 1000 mL  Total OUT: 1000 mL    s1s2  b/l air entry  soft, ascites   edema                                                                                                                                                   9.4    14.86 )-----------( 147      ( 25 Mar 2025 08:48 )             31.3     25 Mar 2025 08:48    131    |  85     |  101    ----------------------------<  111    4.2     |  28     |  6.75     Ca    8.6        25 Mar 2025 08:48    acetaminophen     Tablet  650 milliGRAM(s) Oral every 6 hours PRN  albuterol/ipratropium for Nebulization 3 milliLiter(s) Nebulizer every 6 hours PRN  allopurinol 100 milliGRAM(s) Oral daily  atorvastatin 20 milliGRAM(s) Oral at bedtime  bisacodyl 5 milliGRAM(s) Oral every 12 hours PRN  calcium acetate 667 milliGRAM(s) Oral three times a day with meals  chlorhexidine 2% Cloths 1 Application(s) Topical daily  chlorhexidine 4% Liquid 1 Application(s) Topical <User Schedule>  dextrose 5%. 1000 milliLiter(s) IV Continuous <Continuous>  dextrose 5%. 1000 milliLiter(s) IV Continuous <Continuous>  dextrose 50% Injectable 25 Gram(s) IV Push once  dextrose 50% Injectable 12.5 Gram(s) IV Push once  dextrose 50% Injectable 25 Gram(s) IV Push once  dextrose Oral Gel 15 Gram(s) Oral once PRN  dextrose Oral Gel 15 Gram(s) Oral once PRN  epoetin lashawn (EPOGEN) Injectable 60275 Unit(s) SubCutaneous every 7 days  fluticasone propionate/ salmeterol 250-50 MICROgram(s) Diskus 1 Dose(s) Inhalation two times a day  folic acid 1 milliGRAM(s) Oral daily  glucagon  Injectable 1 milliGRAM(s) IntraMuscular once  hydrALAZINE 25 milliGRAM(s) Oral three times a day  insulin glargine Injectable (LANTUS) 18 Unit(s) SubCutaneous at bedtime  insulin lispro (ADMELOG) corrective regimen sliding scale   SubCutaneous three times a day before meals  insulin lispro Injectable (ADMELOG) 3 Unit(s) SubCutaneous three times a day before meals  isosorbide   dinitrate Tablet (ISORDIL) 10 milliGRAM(s) Oral three times a day  melatonin 3 milliGRAM(s) Oral at bedtime PRN  metoprolol succinate ER 50 milliGRAM(s) Oral daily  ondansetron Injectable 4 milliGRAM(s) IV Push every 6 hours PRN  pantoprazole    Tablet 40 milliGRAM(s) Oral every 12 hours  polyethylene glycol 3350 17 Gram(s) Oral two times a day  psyllium Powder 1 Packet(s) Oral daily  sodium chloride 0.65% Nasal 1 Spray(s) Both Nostrils two times a day  sodium chloride 0.9% lock flush 10 milliLiter(s) IV Push every 1 hour PRN  tamsulosin 0.4 milliGRAM(s) Oral at bedtime  tiotropium 2.5 MICROgram(s) Inhaler 2 Puff(s) Inhalation daily    A/P:    COPD, on home O2  CM, EF ~ 30%, severe TR  Adm to [facility] VS 1/10/25 w/fluid overload   Tx to [facility] 2/5/25 for HF eval    S/p LVP 3/14 w/4.5L fluid removal, s/p SPA x 4   Dropped Hgb to 5.3 on 3/15, s/p PRBCs   Hemodynamic/cardio-renal DENISHA/CKD   Poor UO on IV Bumex  Start on HD yesterday via temp HD cath   2nd HD today  Next HD tomorrow  TMP as tolerates   Pls arrange for perm cath w/IR  Pt also will need arrangements for CHIARA w/HD     [phone]

## 2025-03-25 NOTE — CHART NOTE - NSCHARTNOTEFT_GEN_A_CORE
78 y/o male w/ PMHx CAD s/p CABG, HF (combined HFrEF [EF 25%] and HFpEF [G3DD]) w/ ICD, AFib on Xarelto, severe AS s/p TAVR, COPD on 3-4L home O2, CKD3, HTN, HLD, T2DM, and BPH who presents as a transfer from [facility] for HF evaluation, admitted for acute on chronic hypoxic respiratory failure due to both anemia and acute on chronic CHF exacerbation.  Urology called for concern for ro catheter obstruction. Ro catheter was placed 3/23/25. Bladder scan at bedside ~700cc.   Patient seen at bedside. Ro catheter balloon was deflated and ro catheter was advanced to the hub. Balloon was then reinflated and manual bladder irrigation performed with sterile water at bedside. Patient irrigated to clear water, no clots, urine clear yellow in ro tubing.   Per primary team, patient has a history of ascites, recently underwent paracentesis on 3/15/25 in which 4600cc of fluid was drained.   Per family at bedside, patient recently underwent HD 3/24/25 and 3/25/25.     - Ro catheter in place draining well. Flushes well.   - Fluid picked up by bladder scan could be urine in bladder vs ascites fluid in abdomen --> urinary retention unlikely as ro catheter in place and is draining well.   - Can give one dose of trospium for bladder spasms if no medical contraindications   - Monitor urine output      [facility]  [address] [place] [address], [address]  [place] [address] [phone]

## 2025-03-25 NOTE — PROGRESS NOTE ADULT - SUBJECTIVE AND OBJECTIVE BOX
Patient is a 79y old  Male who presents with a chief complaint of HF eval (25 Mar 2025 13:14)      SUBJECTIVE / OVERNIGHT EVENTS: pt feels ok, no cp, sob    MEDICATIONS  (STANDING):  allopurinol 100 milliGRAM(s) Oral daily  atorvastatin 20 milliGRAM(s) Oral at bedtime  calcium acetate 667 milliGRAM(s) Oral three times a day with meals  chlorhexidine 2% Cloths 1 Application(s) Topical daily  chlorhexidine 4% Liquid 1 Application(s) Topical <User Schedule>  dextrose 5%. 1000 milliLiter(s) (100 mL/Hr) IV Continuous <Continuous>  dextrose 5%. 1000 milliLiter(s) (50 mL/Hr) IV Continuous <Continuous>  dextrose 50% Injectable 25 Gram(s) IV Push once  dextrose 50% Injectable 12.5 Gram(s) IV Push once  dextrose 50% Injectable 25 Gram(s) IV Push once  epoetin lashawn (EPOGEN) Injectable 24145 Unit(s) IV Push <User Schedule>  fluticasone propionate/ salmeterol 250-50 MICROgram(s) Diskus 1 Dose(s) Inhalation two times a day  folic acid 1 milliGRAM(s) Oral daily  glucagon  Injectable 1 milliGRAM(s) IntraMuscular once  hydrALAZINE 10 milliGRAM(s) Oral three times a day  insulin glargine Injectable (LANTUS) 18 Unit(s) SubCutaneous at bedtime  insulin lispro (ADMELOG) corrective regimen sliding scale   SubCutaneous three times a day before meals  insulin lispro Injectable (ADMELOG) 3 Unit(s) SubCutaneous three times a day before meals  isosorbide   dinitrate Tablet (ISORDIL) 10 milliGRAM(s) Oral three times a day  metoprolol succinate ER 50 milliGRAM(s) Oral daily  pantoprazole    Tablet 40 milliGRAM(s) Oral every 12 hours  polyethylene glycol 3350 17 Gram(s) Oral two times a day  psyllium Powder 1 Packet(s) Oral daily  sodium chloride 0.65% Nasal 1 Spray(s) Both Nostrils two times a day  tamsulosin 0.4 milliGRAM(s) Oral at bedtime  tiotropium 2.5 MICROgram(s) Inhaler 2 Puff(s) Inhalation daily    MEDICATIONS  (PRN):  acetaminophen     Tablet .. 650 milliGRAM(s) Oral every 6 hours PRN Temp greater or equal to 38C (100.4F), Mild Pain (1 - 3)  albuterol/ipratropium for Nebulization 3 milliLiter(s) Nebulizer every 6 hours PRN Shortness of Breath and/or Wheezing  bisacodyl 5 milliGRAM(s) Oral every 12 hours PRN Constipation  dextrose Oral Gel 15 Gram(s) Oral once PRN Blood Glucose LESS THAN 70 milliGRAM(s)/deciliter  dextrose Oral Gel 15 Gram(s) Oral once PRN Blood Glucose LESS THAN 70 milliGRAM(s)/deciliter  melatonin 3 milliGRAM(s) Oral at bedtime PRN Insomnia  ondansetron Injectable 4 milliGRAM(s) IV Push every 6 hours PRN Nausea and/or Vomiting  sodium chloride 0.9% lock flush 10 milliLiter(s) IV Push every 1 hour PRN Pre/post blood products, medications, blood draw, and to maintain line patency        CAPILLARY BLOOD GLUCOSE      POCT Blood Glucose.: 154 mg/dL (25 Mar 2025 12:37)  POCT Blood Glucose.: 113 mg/dL (25 Mar 2025 09:35)  POCT Blood Glucose.: 147 mg/dL (25 Mar 2025 07:48)  POCT Blood Glucose.: 140 mg/dL (24 Mar 2025 22:25)  POCT Blood Glucose.: 152 mg/dL (24 Mar 2025 17:17)    I&O's Summary    24 Mar 2025 07:01  -  25 Mar 2025 07:00  --------------------------------------------------------  IN: 600 mL / OUT: 800 mL / NET: -200 mL    25 Mar 2025 07:01  -  25 Mar 2025 13:49  --------------------------------------------------------  IN: 0 mL / OUT: 1000 mL / NET: -1000 mL        PHYSICAL EXAM:  CONSTITUTIONAL: NAD  EYES: PERRLA; conjunctiva and sclera clear  NECK: Supple  RESPIRATORY: Normal respiratory effort; CTAB  CARDIOVASCULAR: RRR, S1S2  ABDOMEN: distended  MUSCLOSKELETAL:  anasarca  PSYCH: A+O x 2-3, affect normal  NEUROLOGY: CN 2-12 are intact       LABS:                        9.4    14.86 )-----------( 147      ( 25 Mar 2025 08:48 )             31.3     03-25    131[L]  |  85[L]  |  101[H]  ----------------------------<  111[H]  4.2   |  28  |  6.75[H]    Ca    8.6      25 Mar 2025 08:48      PT/INR - ( 24 Mar 2025 07:24 )   PT: 11.8 sec;   INR: 1.04 ratio               Urinalysis Basic - ( 25 Mar 2025 08:48 )    Color: x / Appearance: x / SG: x / pH: x  Gluc: 111 mg/dL / Ketone: x  / Bili: x / Urobili: x   Blood: x / Protein: x / Nitrite: x   Leuk Esterase: x / RBC: x / WBC x   Sq Epi: x / Non Sq Epi: x / Bacteria: x        RADIOLOGY & ADDITIONAL TESTS:    Imaging Personally Reviewed:    Consultant(s) Notes Reviewed:      Care Discussed with Consultants/Other Providers:

## 2025-03-25 NOTE — CONSULT NOTE ADULT - SUBJECTIVE AND OBJECTIVE BOX
Interventional Radiology    Evaluate for Procedure: non-tunneled to tunneled hd catheter placement    HPI: 80 y/o male w/ PMHx CAD s/p CABG, HF (combined HFrEF [EF 25%] and HFpEF [G3DD]) w/ ICD, AFib on Xarelto, severe AS s/p TAVR, COPD on 3-4L home O2, CKD4, HTN, HLD, T2DM, and BPH who presents as a transfer from [facility] for HF evaluation. Ongoing CHF exacerbation now off bumex gtt, new DENISHA on CKD likely 2/2 ongoing cardiorenal and possible component of abdominal compartment syndrome given tremendous ascites, now plan for shiley/HD; Pt now requiring long term HD; IR consulted for conversion of nontunneled to tunneled hd catheter.       Allergies: No Known Allergies    Medications (Abx/Cardiac/Anticoagulation/Blood Products)  buMETAnide IVPB: 132 mL/Hr IV Intermittent ( @ 14:57)  hydrALAZINE: 25 milliGRAM(s) Oral ( @ 14:57)  isosorbide   dinitrate Tablet (ISORDIL): 10 milliGRAM(s) Oral ( @ 23:10)  metoprolol succinate ER: 50 milliGRAM(s) Oral ( @ 11:34)    Data:  T(C): 36.3  HR: 70  BP: 104/65  RR: 18  SpO2: 99%    -WBC 14.86 / HgB 9.4 / Hct 31.3 / Plt 147  -Na 131 / Cl 85 /  / Glucose 111  -K 4.2 / CO2 28 / Cr 6.75  -ALT -- / Alk Phos -- / T.Bili --  -INR 1.04 / PTT --    Assessment/Plan: 80 y/o male w/ PMHx CAD s/p CABG, HF (combined HFrEF [EF 25%] and HFpEF [G3DD]) w/ ICD, AFib on Xarelto, severe AS s/p TAVR, COPD on 3-4L home O2, CKD4, HTN, HLD, T2DM, and BPH who presents as a transfer from [facility] for HF evaluation. Ongoing CHF exacerbation now off bumex gtt, new DENISHA on CKD likely 2/2 ongoing cardiorenal and possible component of abdominal compartment syndrome given tremendous ascites, now plan for shiley/HD; Pt now requiring long term HD; IR consulted for conversion of nontunneled to tunneled hd catheter.     - case reviewed and approved for Thursday, 3/27 pending ID clearance  - please place IR procedure order under NP Swann  - STAT labs in AM (cbc,coags, bmp, T&S)  - pt not on ac; if plans to start please reach out to IR for recommendations  - NPO on Wednesday, 3/26 at 11pm  - d/w primary team    Any questions or concerns regarding above please reach out to IR:   -Available on [employer] teams  -During working hours (7a-5p): call -[phone]  -Emergent issues after 5pm: page: [phone]  -Non-emergent consults: Please place a [facility] order "IR Consult" with an appropriate callback number  -Scheduling questions: [phone]  -Clinic/Outpatient [phone]      NERISSA Saldaña- BC  Available on teams

## 2025-03-25 NOTE — CONSULT NOTE ADULT - SUBJECTIVE AND OBJECTIVE BOX
Patient is a 79y old  Male who presents with a chief complaint of HF eval (25 Mar 2025 13:49)    HPI:  This is an 78 y/o male w/ PMHx CAD s/p CABG, HF (combined HFrEF [EF 25%] and HFpEF [G3DD]) w/ ICD, AFib on Xarelto, severe AS s/p TAVR, COPD on 3-4L home O2, CKD3, HTN, HLD, T2DM, and BPH who presents as a transfer from [facility] for HF evaluation. He presented to [place] on 1/10/25 for worsening of his chronic SOB for 2 weeks, with associated increased swelling of legs and abdomen. He was admitted for acute on chronic hypoxic respiratory failure due to both anemia and acute on chronic CHF exacerbation. He was anemic to 6.5 on presentation there and hypoxic requiring Bipap. He was started on bumex drip, then transitioned to IVP lasix 80mg BID, then had to be transitioned to diamox due to his course being complicated by contraction alkalosis and hypokalemia. Then had Bumex 2mg BID started as he was still volume overloaded. He is now back to his baseline O2 requirements and was transferred here for evaluation by HF team per recommendation by cardiology at [facility] due to his persistent SOB with minimal exertion.     Here he is afebrile, hemodynamically stable, saturating 92% on 3LNC. States that he isn't feeling any SOB at the moment. Says his legs are still swollen but they are much better than when he was admitted to [facility].    (05 Feb 2025 21:35)       prior hospital charts reviewed [  ]  primary team notes reviewed [  ]  other consultant notes reviewed [  ]    PAST MEDICAL & SURGICAL HISTORY:  HTN (hypertension)      COPD (chronic obstructive pulmonary disease)      HLD (hyperlipidemia)      Insulin dependent type 2 diabetes mellitus      Chronic hypoxic respiratory failure, on home oxygen therapy      Stage 4 chronic kidney disease      Chronic combined systolic and diastolic heart failure      Unspecified atrial fibrillation      S/P CABG x 3  cabg3  in 2003      S/P hernia repair  hernia [id number]      S/P implantation of automatic cardioverter/defibrillator (AICD)          Allergies  No Known Allergies    ANTIMICROBIALS (past 90 days)  MEDICATIONS  (STANDING):    meropenem  IVPB   100 mL/Hr IV Intermittent (03-12-25 @ 15:17)    meropenem  IVPB   100 mL/Hr IV Intermittent (03-18-25 @ 05:10)   100 mL/Hr IV Intermittent (03-17-25 @ 17:27)   100 mL/Hr IV Intermittent (03-17-25 @ 05:07)   100 mL/Hr IV Intermittent (03-16-25 @ 19:35)   100 mL/Hr IV Intermittent (03-16-25 @ 05:16)   100 mL/Hr IV Intermittent (03-15-25 @ 18:50)   100 mL/Hr IV Intermittent (03-15-25 @ 05:47)   100 mL/Hr IV Intermittent (03-14-25 @ 19:09)   100 mL/Hr IV Intermittent (03-14-25 @ 05:54)   100 mL/Hr IV Intermittent (03-13-25 @ 17:21)   100 mL/Hr IV Intermittent (03-13-25 @ 05:58)    meropenem  IVPB   100 mL/Hr IV Intermittent (03-20-25 @ 05:24)   100 mL/Hr IV Intermittent (03-19-25 @ 17:28)   100 mL/Hr IV Intermittent (03-19-25 @ 05:20)   100 mL/Hr IV Intermittent (03-18-25 @ 17:03)          MEDICATIONS  (STANDING):  acetaminophen     Tablet .. 650 every 6 hours PRN  albuterol/ipratropium for Nebulization 3 every 6 hours PRN  allopurinol 100 daily  atorvastatin 20 at bedtime  bisacodyl 5 every 12 hours PRN  dextrose 50% Injectable 25 once  dextrose 50% Injectable 12.5 once  dextrose 50% Injectable 25 once  dextrose Oral Gel 15 once PRN  dextrose Oral Gel 15 once PRN  epoetin lashawn (EPOGEN) Injectable 55521 <User Schedule>  fluticasone propionate/ salmeterol 250-50 MICROgram(s) Diskus 1 two times a day  glucagon  Injectable 1 once  hydrALAZINE 10 three times a day  insulin glargine Injectable (LANTUS) 18 at bedtime  insulin lispro (ADMELOG) corrective regimen sliding scale  three times a day before meals  insulin lispro Injectable (ADMELOG) 3 three times a day before meals  isosorbide   dinitrate Tablet (ISORDIL) 10 three times a day  melatonin 3 at bedtime PRN  metoprolol succinate ER 50 daily  ondansetron Injectable 4 every 6 hours PRN  pantoprazole    Tablet 40 every 12 hours  polyethylene glycol 3350 17 two times a day  psyllium Powder 1 daily  tamsulosin 0.4 at bedtime  tiotropium 2.5 MICROgram(s) Inhaler 2 daily    SOCIAL HISTORY:       FAMILY HISTORY:  FH: HTN (hypertension)      REVIEW OF SYSTEMS  [  ] ROS unobtainable because:    [  ] All other systems negative except as noted below:	    Constitutional:  [ ] fever [ ] chills  [ ] weight loss  [ ] weakness  Skin:  [ ] rash [ ] phlebitis	  Eyes: [ ] icterus [ ] pain  [ ] discharge	  ENMT: [ ] sore throat  [ ] thrush [ ] ulcers [ ] exudates  Respiratory: [ ] dyspnea [ ] hemoptysis [ ] cough [ ] sputum	  Cardiovascular:  [ ] chest pain [ ] palpitations [ ] edema	  Gastrointestinal:  [ ] nausea [ ] vomiting [ ] diarrhea [ ] constipation [ ] pain	  Genitourinary:  [ ] dysuria [ ] frequency [ ] hematuria [ ] discharge [ ] flank pain  [ ] incontinence  Musculoskeletal:  [ ] myalgias [ ] arthralgias [ ] arthritis  [ ] back pain  Neurological:  [ ] headache [ ] seizures  [ ] confusion/altered mental status  Psychiatric:  [ ] anxiety [ ] depression	  Hematology/Lymphatics:  [ ] lymphadenopathy  Endocrine:  [ ] adrenal [ ] thyroid  Allergic/Immunologic:	 [ ] transplant [ ] seasonal    Vital Signs Last 24 Hrs  T(F): 97.3 (03-25-25 @ 11:28), Max: 98.4 (03-18-25 @ 20:13)  Vital Signs Last 24 Hrs  HR: 70 (03-25-25 @ 14:00) (70 - 75)  BP: 96/60 (03-25-25 @ 14:00) (96/60 - 128/74)  RR: 18 (03-25-25 @ 11:28)  SpO2: 99% (03-25-25 @ 11:28) (93% - 99%)  Wt(kg): --    PHYSICAL EXAM:  Constitutional: non-toxic, no distress  HEAD/EYES: anicteric, no conjunctival injection  ENT:  supple, no thrush  Cardiovascular:   normal S1, S2, no murmur, no edema  Respiratory:  clear BS bilaterally, no wheezes, no rales  GI:  soft, non-tender, normal bowel sounds  :  no ro, no CVA tenderness  Musculoskeletal:  no synovitis, normal ROM  Neurologic: awake and alert, normal strength, no focal findings  Skin:  no rash, no erythema, no phlebitis  Heme/Onc: no lymphadenopathy   Psychiatric:  awake, alert, appropriate mood                            9.4    14.86 )-----------( 147      ( 25 Mar 2025 08:48 )             31.3   03-25    131[L]  |  85[L]  |  101[H]  ----------------------------<  111[H]  4.2   |  28  |  6.75[H]    Ca    8.6      25 Mar 2025 08:48      Urinalysis Basic - ( 25 Mar 2025 08:48 )    Color: x / Appearance: x / SG: x / pH: x  Gluc: 111 mg/dL / Ketone: x  / Bili: x / Urobili: x   Blood: x / Protein: x / Nitrite: x   Leuk Esterase: x / RBC: x / WBC x   Sq Epi: x / Non Sq Epi: x / Bacteria: x    MICROBIOLOGY:              RADIOLOGY:  imaging below personally reviewed and agree with findings Patient is a 79y old  Male who presents with a chief complaint of HF eval (25 Mar 2025 13:49)    HPI:  79-year-old male with a past medical history significant for CAD status post CABG, heart failure, ICD placement, severe aortic stenosis status post TAVR procedure, COPD on home oxygen at 3 to 4 L at baseline, hypertension, hyperlipidemia, diabetes who was transferred from [facility] for heart failure exacerbation.    Patient admitted for further management of CHF exacerbation with diuretic utilization however now with new DENISHA on CKD due to cardiorenal syndrome and development of ascites requiring dialysis.  Patient with Shiley catheter placement on 3/24/2025.  Now plan for tunneled dialysis catheter.    Since admission, patient has been afebrile.  Otherwise has remained hemodynamically stable on baseline oxygen requirements of 4 L.  Labs show development of leukocytosis with latest value 14.8.  Patient did not have a leukocytosis up until 3/3/2025.  Infectious workup was previously obtained with RVP negative.  Urine culture on 3/12/2025 with Staph aureus, urinalysis obtained did show pyuria with many bacteria.  Patient completed a course of meropenem empirically through 3/20/2025.  TTE obtained on admission on 2/7/2025 without evidence for valvular vegetation.  Patient with paracentesis, no evidence for SBP on 3/14/2025.    #Leukocytosis  #Plan for tunneled dialysis catheter  #Positive urine culture, Staph aureus on 3/12/2025  #Presence of cardiac hardware, ICD  #Presence of TAVR  #Ascites    Recommendations  Would monitor off antibiotics at this time  Would obtain surveillance blood cultures given previous Staph aureus growth in urine, multiple potential sources including ICD, TAVR  Left fever curve and WBC count    Iván Dash MD  Division of Infectious Diseases       prior hospital charts reviewed [  x]  primary team notes reviewed [ x ]  other consultant notes reviewed [x  ]  x  PAST MEDICAL & SURGICAL HISTORY:  HTN (hypertension)      COPD (chronic obstructive pulmonary disease)      HLD (hyperlipidemia)      Insulin dependent type 2 diabetes mellitus      Chronic hypoxic respiratory failure, on home oxygen therapy      Stage 4 chronic kidney disease      Chronic combined systolic and diastolic heart failure      Unspecified atrial fibrillation      S/P CABG x 3  cabg3  in 2003      S/P hernia repair  hernia [id number]      S/P implantation of automatic cardioverter/defibrillator (AICD)          Allergies  No Known Allergies    ANTIMICROBIALS (past 90 days)  MEDICATIONS  (STANDING):    meropenem  IVPB   100 mL/Hr IV Intermittent (03-12-25 @ 15:17)    meropenem  IVPB   100 mL/Hr IV Intermittent (03-18-25 @ 05:10)   100 mL/Hr IV Intermittent (03-17-25 @ 17:27)   100 mL/Hr IV Intermittent (03-17-25 @ 05:07)   100 mL/Hr IV Intermittent (03-16-25 @ 19:35)   100 mL/Hr IV Intermittent (03-16-25 @ 05:16)   100 mL/Hr IV Intermittent (03-15-25 @ 18:50)   100 mL/Hr IV Intermittent (03-15-25 @ 05:47)   100 mL/Hr IV Intermittent (03-14-25 @ 19:09)   100 mL/Hr IV Intermittent (03-14-25 @ 05:54)   100 mL/Hr IV Intermittent (03-13-25 @ 17:21)   100 mL/Hr IV Intermittent (03-13-25 @ 05:58)    meropenem  IVPB   100 mL/Hr IV Intermittent (03-20-25 @ 05:24)   100 mL/Hr IV Intermittent (03-19-25 @ 17:28)   100 mL/Hr IV Intermittent (03-19-25 @ 05:20)   100 mL/Hr IV Intermittent (03-18-25 @ 17:03)          MEDICATIONS  (STANDING):  acetaminophen     Tablet .. 650 every 6 hours PRN  albuterol/ipratropium for Nebulization 3 every 6 hours PRN  allopurinol 100 daily  atorvastatin 20 at bedtime  bisacodyl 5 every 12 hours PRN  dextrose 50% Injectable 25 once  dextrose 50% Injectable 12.5 once  dextrose 50% Injectable 25 once  dextrose Oral Gel 15 once PRN  dextrose Oral Gel 15 once PRN  epoetin lashawn (EPOGEN) Injectable 89752 <User Schedule>  fluticasone propionate/ salmeterol 250-50 MICROgram(s) Diskus 1 two times a day  glucagon  Injectable 1 once  hydrALAZINE 10 three times a day  insulin glargine Injectable (LANTUS) 18 at bedtime  insulin lispro (ADMELOG) corrective regimen sliding scale  three times a day before meals  insulin lispro Injectable (ADMELOG) 3 three times a day before meals  isosorbide   dinitrate Tablet (ISORDIL) 10 three times a day  melatonin 3 at bedtime PRN  metoprolol succinate ER 50 daily  ondansetron Injectable 4 every 6 hours PRN  pantoprazole    Tablet 40 every 12 hours  polyethylene glycol 3350 17 two times a day  psyllium Powder 1 daily  tamsulosin 0.4 at bedtime  tiotropium 2.5 MICROgram(s) Inhaler 2 daily    SOCIAL HISTORY:     Lives at home.  FAMILY HISTORY:  FH: HTN (hypertension)      REVIEW OF SYSTEMS  [  ] ROS unobtainable because:    [  x] All other systems negative except as noted below:	    Constitutional:  [ ] fever [ ] chills  [ ] weight loss  [ ] weakness  Skin:  [ ] rash [ ] phlebitis	  Eyes: [ ] icterus [ ] pain  [ ] discharge	  ENMT: [ ] sore throat  [ ] thrush [ ] ulcers [ ] exudates  Respiratory: [ ] dyspnea [ ] hemoptysis [ ] cough [ ] sputum	  Cardiovascular:  [ ] chest pain [ ] palpitations [ ] edema	  Gastrointestinal:  [ ] nausea [ ] vomiting [ ] diarrhea [ ] constipation [ ] pain	  Genitourinary:  [ ] dysuria [ ] frequency [ ] hematuria [ ] discharge [ ] flank pain  [ ] incontinence  Musculoskeletal:  [ ] myalgias [ ] arthralgias [ ] arthritis  [ ] back pain  Neurological:  [ ] headache [ ] seizures  [ ] confusion/altered mental status  Psychiatric:  [ ] anxiety [ ] depression	  Hematology/Lymphatics:  [ ] lymphadenopathy  Endocrine:  [ ] adrenal [ ] thyroid  Allergic/Immunologic:	 [ ] transplant [ ] seasonal    Vital Signs Last 24 Hrs  T(F): 97.3 (03-25-25 @ 11:28), Max: 98.4 (03-18-25 @ 20:13)  Vital Signs Last 24 Hrs  HR: 70 (03-25-25 @ 14:00) (70 - 75)  BP: 96/60 (03-25-25 @ 14:00) (96/60 - 128/74)  RR: 18 (03-25-25 @ 11:28)  SpO2: 99% (03-25-25 @ 11:28) (93% - 99%)  Wt(kg): --    PHYSICAL EXAM:  Constitutional: non-toxic, no distress  HEAD/EYES: anicteric, no conjunctival injection  ENT:  supple, no thrush  Cardiovascular:   normal S1, S2, no murmur, no edema  Respiratory:  clear BS bilaterally, no wheezes, no rales  GI:  soft, non-tender, normal bowel sounds  :  no ro, no CVA tenderness  Musculoskeletal:  no synovitis, normal ROM  Neurologic: awake and alert, normal strength, no focal findings  Skin:  no rash, no erythema, no phlebitis  Heme/Onc: no lymphadenopathy   Psychiatric:  awake, alert, appropriate mood                            9.4    14.86 )-----------( 147      ( 25 Mar 2025 08:48 )             31.3   03-25    131[L]  |  85[L]  |  101[H]  ----------------------------<  111[H]  4.2   |  28  |  6.75[H]    Ca    8.6      25 Mar 2025 08:48      Urinalysis Basic - ( 25 Mar 2025 08:48 )    Color: x / Appearance: x / SG: x / pH: x  Gluc: 111 mg/dL / Ketone: x  / Bili: x / Urobili: x   Blood: x / Protein: x / Nitrite: x   Leuk Esterase: x / RBC: x / WBC x   Sq Epi: x / Non Sq Epi: x / Bacteria: x    MICROBIOLOGY:              RADIOLOGY:  imaging below personally reviewed and agree with findings Patient is a 79y old  Male who presents with a chief complaint of HF eval (25 Mar 2025 13:49)    HPI:  79-year-old male with a past medical history significant for CAD status post CABG, heart failure, ICD placement, severe aortic stenosis status post TAVR procedure, COPD on home oxygen at 3 to 4 L at baseline, hypertension, hyperlipidemia, diabetes who was transferred from [facility] for heart failure exacerbation.    Patient admitted for further management of CHF exacerbation with diuretic utilization however now with new DENISHA on CKD due to cardiorenal syndrome and development of ascites requiring dialysis.  Patient with Shiley catheter placement on 3/24/2025.  Now plan for tunneled dialysis catheter.    Since admission, patient has been afebrile.  Otherwise has remained hemodynamically stable on baseline oxygen requirements of 4 L.  Labs show development of leukocytosis with latest value 14.8.  Patient did not have a leukocytosis up until 3/3/2025.  Infectious workup was previously obtained with RVP negative.  Urine culture on 3/12/2025 with Staph aureus, urinalysis obtained did show pyuria with many bacteria.  Patient completed a course of meropenem empirically through 3/20/2025.  TTE obtained on admission on 2/7/2025 without evidence for valvular vegetation.  Patient with paracentesis, no evidence for SBP on 3/14/2025.    #Leukocytosis  #Plan for tunneled dialysis catheter  #Positive urine culture, Staph aureus on 3/12/2025  #Presence of cardiac hardware, ICD  #Presence of TAVR  #Ascites    Recommendations  Would monitor off antibiotics at this time  Would obtain surveillance blood cultures given previous Staph aureus growth in urine, multiple potential sources including ICD, TAVR  Left fever curve and WBC count    Iván Dash MD  Division of Infectious Diseases       prior hospital charts reviewed [  x]  primary team notes reviewed [ x ]  other consultant notes reviewed [x  ]  x  PAST MEDICAL & SURGICAL HISTORY:  HTN (hypertension)      COPD (chronic obstructive pulmonary disease)      HLD (hyperlipidemia)      Insulin dependent type 2 diabetes mellitus      Chronic hypoxic respiratory failure, on home oxygen therapy      Stage 4 chronic kidney disease      Chronic combined systolic and diastolic heart failure      Unspecified atrial fibrillation      S/P CABG x 3  cabg3  in 2003      S/P hernia repair  hernia [id number]      S/P implantation of automatic cardioverter/defibrillator (AICD)          Allergies  No Known Allergies    ANTIMICROBIALS (past 90 days)  MEDICATIONS  (STANDING):    meropenem  IVPB   100 mL/Hr IV Intermittent (03-12-25 @ 15:17)    meropenem  IVPB   100 mL/Hr IV Intermittent (03-18-25 @ 05:10)   100 mL/Hr IV Intermittent (03-17-25 @ 17:27)   100 mL/Hr IV Intermittent (03-17-25 @ 05:07)   100 mL/Hr IV Intermittent (03-16-25 @ 19:35)   100 mL/Hr IV Intermittent (03-16-25 @ 05:16)   100 mL/Hr IV Intermittent (03-15-25 @ 18:50)   100 mL/Hr IV Intermittent (03-15-25 @ 05:47)   100 mL/Hr IV Intermittent (03-14-25 @ 19:09)   100 mL/Hr IV Intermittent (03-14-25 @ 05:54)   100 mL/Hr IV Intermittent (03-13-25 @ 17:21)   100 mL/Hr IV Intermittent (03-13-25 @ 05:58)    meropenem  IVPB   100 mL/Hr IV Intermittent (03-20-25 @ 05:24)   100 mL/Hr IV Intermittent (03-19-25 @ 17:28)   100 mL/Hr IV Intermittent (03-19-25 @ 05:20)   100 mL/Hr IV Intermittent (03-18-25 @ 17:03)          MEDICATIONS  (STANDING):  acetaminophen     Tablet .. 650 every 6 hours PRN  albuterol/ipratropium for Nebulization 3 every 6 hours PRN  allopurinol 100 daily  atorvastatin 20 at bedtime  bisacodyl 5 every 12 hours PRN  dextrose 50% Injectable 25 once  dextrose 50% Injectable 12.5 once  dextrose 50% Injectable 25 once  dextrose Oral Gel 15 once PRN  dextrose Oral Gel 15 once PRN  epoetin lashawn (EPOGEN) Injectable 74052 <User Schedule>  fluticasone propionate/ salmeterol 250-50 MICROgram(s) Diskus 1 two times a day  glucagon  Injectable 1 once  hydrALAZINE 10 three times a day  insulin glargine Injectable (LANTUS) 18 at bedtime  insulin lispro (ADMELOG) corrective regimen sliding scale  three times a day before meals  insulin lispro Injectable (ADMELOG) 3 three times a day before meals  isosorbide   dinitrate Tablet (ISORDIL) 10 three times a day  melatonin 3 at bedtime PRN  metoprolol succinate ER 50 daily  ondansetron Injectable 4 every 6 hours PRN  pantoprazole    Tablet 40 every 12 hours  polyethylene glycol 3350 17 two times a day  psyllium Powder 1 daily  tamsulosin 0.4 at bedtime  tiotropium 2.5 MICROgram(s) Inhaler 2 daily    SOCIAL HISTORY:     Lives at home.  FAMILY HISTORY:  FH: HTN (hypertension)      REVIEW OF SYSTEMS  [  ] ROS unobtainable because:    [  x] All other systems negative except as noted below:	    Constitutional:  [ ] fever [ ] chills  [ ] weight loss  [ ] weakness  Skin:  [ ] rash [ ] phlebitis	  Eyes: [ ] icterus [ ] pain  [ ] discharge	  ENMT: [ ] sore throat  [ ] thrush [ ] ulcers [ ] exudates  Respiratory: [ ] dyspnea [ ] hemoptysis [ ] cough [ ] sputum	  Cardiovascular:  [ ] chest pain [ ] palpitations [ ] edema	  Gastrointestinal:  [ ] nausea [ ] vomiting [ ] diarrhea [ ] constipation [ ] pain	  Genitourinary:  [ ] dysuria [ ] frequency [ ] hematuria [ ] discharge [ ] flank pain  [ ] incontinence  Musculoskeletal:  [ ] myalgias [ ] arthralgias [ ] arthritis  [ ] back pain  Neurological:  [ ] headache [ ] seizures  [ ] confusion/altered mental status  Psychiatric:  [ ] anxiety [ ] depression	  Hematology/Lymphatics:  [ ] lymphadenopathy  Endocrine:  [ ] adrenal [ ] thyroid  Allergic/Immunologic:	 [ ] transplant [ ] seasonal    Vital Signs Last 24 Hrs  T(F): 97.3 (03-25-25 @ 11:28), Max: 98.4 (03-18-25 @ 20:13)  Vital Signs Last 24 Hrs  HR: 70 (03-25-25 @ 14:00) (70 - 75)  BP: 96/60 (03-25-25 @ 14:00) (96/60 - 128/74)  RR: 18 (03-25-25 @ 11:28)  SpO2: 99% (03-25-25 @ 11:28) (93% - 99%)  Wt(kg): --    PHYSICAL EXAM:  Constitutional: non-toxic, no distress  HEAD/EYES: anicteric, no conjunctival injection  ENT:  supple, no thrush  Cardiovascular:   normal S1, S2, no murmur, no edema  Respiratory:  clear BS bilaterally, no wheezes, no rales  GI: distended, tense - ascites present  :  ro+, no CVA tenderness  Musculoskeletal:  no synovitis, normal ROM  Neurologic: awake and alert, normal strength, no focal findings  Skin:  ICD site without evidence for infection, no phlebitis  Heme/Onc: no lymphadenopathy   Psychiatric:  awake, alert, appropriate mood                            9.4    14.86 )-----------( 147      ( 25 Mar 2025 08:48 )             31.3   03-25    131[L]  |  85[L]  |  101[H]  ----------------------------<  111[H]  4.2   |  28  |  6.75[H]    Ca    8.6      25 Mar 2025 08:48      Urinalysis Basic - ( 25 Mar 2025 08:48 )    Color: x / Appearance: x / SG: x / pH: x  Gluc: 111 mg/dL / Ketone: x  / Bili: x / Urobili: x   Blood: x / Protein: x / Nitrite: x   Leuk Esterase: x / RBC: x / WBC x   Sq Epi: x / Non Sq Epi: x / Bacteria: x    MICROBIOLOGY:              RADIOLOGY:  imaging below personally reviewed and agree with findings

## 2025-03-26 LAB
ANION GAP SERPL CALC-SCNC: 17 MMOL/L — SIGNIFICANT CHANGE UP (ref 5–17)
BUN SERPL-MCNC: 91 MG/DL — HIGH (ref 7–23)
CALCIUM SERPL-MCNC: 8.6 MG/DL — SIGNIFICANT CHANGE UP (ref 8.4–10.5)
CHLORIDE SERPL-SCNC: 90 MMOL/L — LOW (ref 96–108)
CO2 SERPL-SCNC: 26 MMOL/L — SIGNIFICANT CHANGE UP (ref 22–31)
CREAT SERPL-MCNC: 5.68 MG/DL — HIGH (ref 0.5–1.3)
EGFR: 9 ML/MIN/1.73M2 — LOW
EGFR: 9 ML/MIN/1.73M2 — LOW
GLUCOSE BLDC GLUCOMTR-MCNC: 132 MG/DL — HIGH (ref 70–99)
GLUCOSE BLDC GLUCOMTR-MCNC: 136 MG/DL — HIGH (ref 70–99)
GLUCOSE BLDC GLUCOMTR-MCNC: 137 MG/DL — HIGH (ref 70–99)
GLUCOSE BLDC GLUCOMTR-MCNC: 191 MG/DL — HIGH (ref 70–99)
GLUCOSE SERPL-MCNC: 156 MG/DL — HIGH (ref 70–99)
HAV IGM SER-ACNC: SIGNIFICANT CHANGE UP
HBV CORE IGM SER-ACNC: SIGNIFICANT CHANGE UP
HBV SURFACE AG SER-ACNC: SIGNIFICANT CHANGE UP
HCT VFR BLD CALC: 34.9 % — LOW (ref 39–50)
HCV AB S/CO SERPL IA: 0.19 S/CO — SIGNIFICANT CHANGE UP (ref 0–0.79)
HCV AB SERPL-IMP: SIGNIFICANT CHANGE UP
HGB BLD-MCNC: 10.2 G/DL — LOW (ref 13–17)
MAGNESIUM SERPL-MCNC: 2.6 MG/DL — SIGNIFICANT CHANGE UP (ref 1.6–2.6)
MCHC RBC-ENTMCNC: 26.6 PG — LOW (ref 27–34)
MCHC RBC-ENTMCNC: 29.2 G/DL — LOW (ref 32–36)
MCV RBC AUTO: 91.1 FL — SIGNIFICANT CHANGE UP (ref 80–100)
NRBC BLD AUTO-RTO: 0 /100 WBCS — SIGNIFICANT CHANGE UP (ref 0–0)
PHOSPHATE SERPL-MCNC: 6.7 MG/DL — HIGH (ref 2.5–4.5)
PLATELET # BLD AUTO: 154 K/UL — SIGNIFICANT CHANGE UP (ref 150–400)
POTASSIUM SERPL-MCNC: 4.4 MMOL/L — SIGNIFICANT CHANGE UP (ref 3.5–5.3)
POTASSIUM SERPL-SCNC: 4.4 MMOL/L — SIGNIFICANT CHANGE UP (ref 3.5–5.3)
RBC # BLD: 3.83 M/UL — LOW (ref 4.2–5.8)
RBC # FLD: 16.9 % — HIGH (ref 10.3–14.5)
SODIUM SERPL-SCNC: 133 MMOL/L — LOW (ref 135–145)
WBC # BLD: 17.91 K/UL — HIGH (ref 3.8–10.5)
WBC # FLD AUTO: 17.91 K/UL — HIGH (ref 3.8–10.5)

## 2025-03-26 PROCEDURE — 99233 SBSQ HOSP IP/OBS HIGH 50: CPT

## 2025-03-26 PROCEDURE — G0545: CPT

## 2025-03-26 PROCEDURE — 99232 SBSQ HOSP IP/OBS MODERATE 35: CPT

## 2025-03-26 RX ADMIN — METOPROLOL SUCCINATE 50 MILLIGRAM(S): 50 TABLET, EXTENDED RELEASE ORAL at 12:47

## 2025-03-26 RX ADMIN — Medication 1 SPRAY(S): at 17:40

## 2025-03-26 RX ADMIN — Medication 100 MILLIGRAM(S): at 12:48

## 2025-03-26 RX ADMIN — Medication 650 MILLIGRAM(S): at 17:45

## 2025-03-26 RX ADMIN — Medication 1 PACKET(S): at 12:49

## 2025-03-26 RX ADMIN — Medication 1 DOSE(S): at 17:42

## 2025-03-26 RX ADMIN — Medication 1 DOSE(S): at 06:00

## 2025-03-26 RX ADMIN — TIOTROPIUM BROMIDE INHALATION SPRAY 2 PUFF(S): 3.12 SPRAY, METERED RESPIRATORY (INHALATION) at 12:50

## 2025-03-26 RX ADMIN — INSULIN GLARGINE-YFGN 18 UNIT(S): 100 INJECTION, SOLUTION SUBCUTANEOUS at 21:35

## 2025-03-26 RX ADMIN — INSULIN LISPRO 3 UNIT(S): 100 INJECTION, SOLUTION INTRAVENOUS; SUBCUTANEOUS at 17:44

## 2025-03-26 RX ADMIN — Medication 1 SPRAY(S): at 05:59

## 2025-03-26 RX ADMIN — Medication 1 APPLICATION(S): at 12:50

## 2025-03-26 RX ADMIN — ISOSORBDIE DINITRATE 10 MILLIGRAM(S): 30 TABLET ORAL at 13:24

## 2025-03-26 RX ADMIN — Medication 10 MILLIGRAM(S): at 05:58

## 2025-03-26 RX ADMIN — FOLIC ACID 1 MILLIGRAM(S): 1 TABLET ORAL at 12:48

## 2025-03-26 RX ADMIN — Medication 667 MILLIGRAM(S): at 17:43

## 2025-03-26 RX ADMIN — Medication 667 MILLIGRAM(S): at 12:48

## 2025-03-26 RX ADMIN — Medication 10 MILLIGRAM(S): at 13:23

## 2025-03-26 RX ADMIN — Medication 40 MILLIGRAM(S): at 17:43

## 2025-03-26 RX ADMIN — ISOSORBDIE DINITRATE 10 MILLIGRAM(S): 30 TABLET ORAL at 05:57

## 2025-03-26 RX ADMIN — IPRATROPIUM BROMIDE AND ALBUTEROL SULFATE 3 MILLILITER(S): .5; 2.5 SOLUTION RESPIRATORY (INHALATION) at 21:35

## 2025-03-26 RX ADMIN — Medication 1 APPLICATION(S): at 06:00

## 2025-03-26 RX ADMIN — Medication 650 MILLIGRAM(S): at 21:20

## 2025-03-26 RX ADMIN — TAMSULOSIN HYDROCHLORIDE 0.4 MILLIGRAM(S): 0.4 CAPSULE ORAL at 21:39

## 2025-03-26 RX ADMIN — ATORVASTATIN CALCIUM 20 MILLIGRAM(S): 80 TABLET, FILM COATED ORAL at 21:40

## 2025-03-26 RX ADMIN — Medication 650 MILLIGRAM(S): at 05:56

## 2025-03-26 RX ADMIN — Medication 40 MILLIGRAM(S): at 05:57

## 2025-03-26 RX ADMIN — EPOETIN ALFA 10000 UNIT(S): 10000 SOLUTION INTRAVENOUS; SUBCUTANEOUS at 10:01

## 2025-03-26 NOTE — PROGRESS NOTE ADULT - ASSESSMENT
all other ROS negative except as per HPI 80 y/o male w/ PMHx CAD s/p CABG, HF (combined HFrEF [EF 25%] and HFpEF [G3DD]) w/ ICD, AFib on Xarelto, severe AS s/p TAVR, COPD on 3-4L home O2, CKD4, HTN, HLD, T2DM, and BPH who presents as a transfer from [facility] for HF evaluation. Ongoing CHF exacerbation now off bumex gtt, new DENISHA on CKD likely 2/2 ongoing cardiorenal and possible component of abdominal compartment syndrome given tremendous ascites, now plan for shiley/HD

## 2025-03-26 NOTE — PROGRESS NOTE ADULT - SUBJECTIVE AND OBJECTIVE BOX
Patient is a 79y old  Male who presents with a chief complaint of HF eval (26 Mar 2025 10:43)      SUBJECTIVE / OVERNIGHT EVENTS: wife at bedside, pt feels ok, denies cp, sob    MEDICATIONS  (STANDING):  allopurinol 100 milliGRAM(s) Oral daily  atorvastatin 20 milliGRAM(s) Oral at bedtime  calcium acetate 667 milliGRAM(s) Oral three times a day with meals  chlorhexidine 2% Cloths 1 Application(s) Topical daily  chlorhexidine 4% Liquid 1 Application(s) Topical <User Schedule>  dextrose 5%. 1000 milliLiter(s) (100 mL/Hr) IV Continuous <Continuous>  dextrose 5%. 1000 milliLiter(s) (50 mL/Hr) IV Continuous <Continuous>  dextrose 50% Injectable 25 Gram(s) IV Push once  dextrose 50% Injectable 12.5 Gram(s) IV Push once  dextrose 50% Injectable 25 Gram(s) IV Push once  epoetin lashawn (EPOGEN) Injectable 33097 Unit(s) IV Push <User Schedule>  fluticasone propionate/ salmeterol 250-50 MICROgram(s) Diskus 1 Dose(s) Inhalation two times a day  folic acid 1 milliGRAM(s) Oral daily  glucagon  Injectable 1 milliGRAM(s) IntraMuscular once  hydrALAZINE 10 milliGRAM(s) Oral three times a day  insulin glargine Injectable (LANTUS) 18 Unit(s) SubCutaneous at bedtime  insulin lispro (ADMELOG) corrective regimen sliding scale   SubCutaneous three times a day before meals  insulin lispro Injectable (ADMELOG) 3 Unit(s) SubCutaneous three times a day before meals  isosorbide   dinitrate Tablet (ISORDIL) 10 milliGRAM(s) Oral three times a day  metoprolol succinate ER 50 milliGRAM(s) Oral daily  pantoprazole    Tablet 40 milliGRAM(s) Oral every 12 hours  polyethylene glycol 3350 17 Gram(s) Oral two times a day  psyllium Powder 1 Packet(s) Oral daily  sodium chloride 0.65% Nasal 1 Spray(s) Both Nostrils two times a day  tamsulosin 0.4 milliGRAM(s) Oral at bedtime  tiotropium 2.5 MICROgram(s) Inhaler 2 Puff(s) Inhalation daily    MEDICATIONS  (PRN):  acetaminophen     Tablet .. 650 milliGRAM(s) Oral every 6 hours PRN Temp greater or equal to 38C (100.4F), Mild Pain (1 - 3)  albuterol/ipratropium for Nebulization 3 milliLiter(s) Nebulizer every 6 hours PRN Shortness of Breath and/or Wheezing  bisacodyl 5 milliGRAM(s) Oral every 12 hours PRN Constipation  dextrose Oral Gel 15 Gram(s) Oral once PRN Blood Glucose LESS THAN 70 milliGRAM(s)/deciliter  dextrose Oral Gel 15 Gram(s) Oral once PRN Blood Glucose LESS THAN 70 milliGRAM(s)/deciliter  melatonin 3 milliGRAM(s) Oral at bedtime PRN Insomnia  ondansetron Injectable 4 milliGRAM(s) IV Push every 6 hours PRN Nausea and/or Vomiting  sodium chloride 0.9% lock flush 10 milliLiter(s) IV Push every 1 hour PRN Pre/post blood products, medications, blood draw, and to maintain line patency        CAPILLARY BLOOD GLUCOSE      POCT Blood Glucose.: 137 mg/dL (26 Mar 2025 12:35)  POCT Blood Glucose.: 191 mg/dL (26 Mar 2025 07:28)  POCT Blood Glucose.: 120 mg/dL (25 Mar 2025 21:43)  POCT Blood Glucose.: 139 mg/dL (25 Mar 2025 17:33)    I&O's Summary    25 Mar 2025 07:01  -  26 Mar 2025 07:00  --------------------------------------------------------  IN: 600 mL / OUT: 1350 mL / NET: -750 mL    26 Mar 2025 07:01  -  26 Mar 2025 13:59  --------------------------------------------------------  IN: 0 mL / OUT: 2000 mL / NET: -2000 mL        PHYSICAL EXAM:  CONSTITUTIONAL: NAD  EYES: PERRLA; conjunctiva and sclera clear  NECK: Supple  RESPIRATORY: Normal respiratory effort; CTAB  CARDIOVASCULAR: RRR, S1S2  ABDOMEN: distended  MUSCLOSKELETAL:  anasarca  PSYCH: A+O x 2-3, affect normal  NEUROLOGY: CN 2-12 are intact     LABS:                        10.2   17.91 )-----------( 154      ( 26 Mar 2025 06:39 )             34.9     03-26    133[L]  |  90[L]  |  91[H]  ----------------------------<  156[H]  4.4   |  26  |  5.68[H]    Ca    8.6      26 Mar 2025 06:39  Phos  6.7     03-26  Mg     2.6     03-26            Urinalysis Basic - ( 26 Mar 2025 06:39 )    Color: x / Appearance: x / SG: x / pH: x  Gluc: 156 mg/dL / Ketone: x  / Bili: x / Urobili: x   Blood: x / Protein: x / Nitrite: x   Leuk Esterase: x / RBC: x / WBC x   Sq Epi: x / Non Sq Epi: x / Bacteria: x        RADIOLOGY & ADDITIONAL TESTS:    Imaging Personally Reviewed:    Consultant(s) Notes Reviewed:      Care Discussed with Consultants/Other Providers:

## 2025-03-26 NOTE — PROGRESS NOTE ADULT - SUBJECTIVE AND OBJECTIVE BOX
Follow Up:  leukocytosis    Interval History/ROS:  Overnight: No acute events.  Patient remains afebrile.  Otherwise hemodynamically stable.  Latest labs show leukocytosis worsening to 18, anemia 10.2/34.9, BMP with elevated creatinine to 5.6 in setting of ESRD.      Patient seen examined at bedside.  No new complaints.    Allergies  No Known Allergies        ANTIMICROBIALS:      OTHER MEDS:  MEDICATIONS  (STANDING):  acetaminophen     Tablet .. 650 every 6 hours PRN  albuterol/ipratropium for Nebulization 3 every 6 hours PRN  allopurinol 100 daily  atorvastatin 20 at bedtime  bisacodyl 5 every 12 hours PRN  dextrose 50% Injectable 25 once  dextrose 50% Injectable 12.5 once  dextrose 50% Injectable 25 once  dextrose Oral Gel 15 once PRN  dextrose Oral Gel 15 once PRN  epoetin lashawn (EPOGEN) Injectable 70304 <User Schedule>  fluticasone propionate/ salmeterol 250-50 MICROgram(s) Diskus 1 two times a day  glucagon  Injectable 1 once  hydrALAZINE 10 three times a day  insulin glargine Injectable (LANTUS) 18 at bedtime  insulin lispro (ADMELOG) corrective regimen sliding scale  three times a day before meals  insulin lispro Injectable (ADMELOG) 3 three times a day before meals  isosorbide   dinitrate Tablet (ISORDIL) 10 three times a day  melatonin 3 at bedtime PRN  metoprolol succinate ER 50 daily  ondansetron Injectable 4 every 6 hours PRN  pantoprazole    Tablet 40 every 12 hours  polyethylene glycol 3350 17 two times a day  psyllium Powder 1 daily  tamsulosin 0.4 at bedtime  tiotropium 2.5 MICROgram(s) Inhaler 2 daily      Vital Signs Last 24 Hrs  T(C): 36.4 (26 Mar 2025 07:40), Max: 36.6 (25 Mar 2025 23:42)  T(F): 97.5 (26 Mar 2025 07:40), Max: 97.8 (25 Mar 2025 23:42)  HR: 69 (26 Mar 2025 07:40) (68 - 73)  BP: 143/78 (26 Mar 2025 07:40) (96/60 - 143/78)  BP(mean): --  RR: 17 (26 Mar 2025 07:40) (17 - 20)  SpO2: 92% (26 Mar 2025 07:40) (92% - 99%)    Parameters below as of 26 Mar 2025 07:40  Patient On (Oxygen Delivery Method): nasal cannula  O2 Flow (L/min): 4      PHYSICAL EXAM:  Constitutional: non-toxic, no distress  HEAD/EYES: anicteric, no conjunctival injection  ENT:  supple, no thrush  Cardiovascular:   normal S1, S2, no murmur, no edema  Respiratory:  clear BS bilaterally, no wheezes, no rales  GI: distended, tense - ascites present  :  ro+, no CVA tenderness  Musculoskeletal:  no synovitis, normal ROM  Neurologic: awake and alert, normal strength, no focal findings  Skin:  ICD site without evidence for infection, no phlebitis  Heme/Onc: no lymphadenopathy   Psychiatric:  awake, alert, appropriate mood                              10.2   17.91 )-----------( 154      ( 26 Mar 2025 06:39 )             34.9       03-26    133[L]  |  90[L]  |  91[H]  ----------------------------<  156[H]  4.4   |  26  |  5.68[H]    Ca    8.6      26 Mar 2025 06:39  Phos  6.7     03-26  Mg     2.6     03-26        Urinalysis Basic - ( 26 Mar 2025 06:39 )    Color: x / Appearance: x / SG: x / pH: x  Gluc: 156 mg/dL / Ketone: x  / Bili: x / Urobili: x   Blood: x / Protein: x / Nitrite: x   Leuk Esterase: x / RBC: x / WBC x   Sq Epi: x / Non Sq Epi: x / Bacteria: x        MICROBIOLOGY:  v                  RADIOLOGY:  Imaging reviewed

## 2025-03-26 NOTE — PROGRESS NOTE ADULT - SUBJECTIVE AND OBJECTIVE BOX
[facility] NEPHROLOGY SERVICES, [facility]  NEPHROLOGY AND HYPERTENSION  [address]  [address]  [place] [address]  [phone]    MD DANIEL GARRETT MD YELENA ROSENBERG, MD BINNY KOSHY, MD CHRISTOPHER CAPUTO, MD EDWARD BOVER, MD          Patient events noted  No distress  On HD    MEDICATIONS  (STANDING):  allopurinol 100 milliGRAM(s) Oral daily  atorvastatin 20 milliGRAM(s) Oral at bedtime  calcium acetate 667 milliGRAM(s) Oral three times a day with meals  chlorhexidine 2% Cloths 1 Application(s) Topical daily  chlorhexidine 4% Liquid 1 Application(s) Topical <User Schedule>  dextrose 5%. 1000 milliLiter(s) (100 mL/Hr) IV Continuous <Continuous>  dextrose 5%. 1000 milliLiter(s) (50 mL/Hr) IV Continuous <Continuous>  dextrose 50% Injectable 25 Gram(s) IV Push once  dextrose 50% Injectable 12.5 Gram(s) IV Push once  dextrose 50% Injectable 25 Gram(s) IV Push once  epoetin lashawn (EPOGEN) Injectable 13817 Unit(s) IV Push <User Schedule>  fluticasone propionate/ salmeterol 250-50 MICROgram(s) Diskus 1 Dose(s) Inhalation two times a day  folic acid 1 milliGRAM(s) Oral daily  glucagon  Injectable 1 milliGRAM(s) IntraMuscular once  hydrALAZINE 10 milliGRAM(s) Oral three times a day  insulin glargine Injectable (LANTUS) 18 Unit(s) SubCutaneous at bedtime  insulin lispro (ADMELOG) corrective regimen sliding scale   SubCutaneous three times a day before meals  insulin lispro Injectable (ADMELOG) 3 Unit(s) SubCutaneous three times a day before meals  isosorbide   dinitrate Tablet (ISORDIL) 10 milliGRAM(s) Oral three times a day  metoprolol succinate ER 50 milliGRAM(s) Oral daily  pantoprazole    Tablet 40 milliGRAM(s) Oral every 12 hours  polyethylene glycol 3350 17 Gram(s) Oral two times a day  psyllium Powder 1 Packet(s) Oral daily  sodium chloride 0.65% Nasal 1 Spray(s) Both Nostrils two times a day  tamsulosin 0.4 milliGRAM(s) Oral at bedtime  tiotropium 2.5 MICROgram(s) Inhaler 2 Puff(s) Inhalation daily    MEDICATIONS  (PRN):  acetaminophen     Tablet .. 650 milliGRAM(s) Oral every 6 hours PRN Temp greater or equal to 38C (100.4F), Mild Pain (1 - 3)  albuterol/ipratropium for Nebulization 3 milliLiter(s) Nebulizer every 6 hours PRN Shortness of Breath and/or Wheezing  bisacodyl 5 milliGRAM(s) Oral every 12 hours PRN Constipation  dextrose Oral Gel 15 Gram(s) Oral once PRN Blood Glucose LESS THAN 70 milliGRAM(s)/deciliter  dextrose Oral Gel 15 Gram(s) Oral once PRN Blood Glucose LESS THAN 70 milliGRAM(s)/deciliter  melatonin 3 milliGRAM(s) Oral at bedtime PRN Insomnia  ondansetron Injectable 4 milliGRAM(s) IV Push every 6 hours PRN Nausea and/or Vomiting  sodium chloride 0.9% lock flush 10 milliLiter(s) IV Push every 1 hour PRN Pre/post blood products, medications, blood draw, and to maintain line patency      25 @ 07:01  -  25 @ 07:00  --------------------------------------------------------  IN: 600 mL / OUT: 1350 mL / NET: -750 mL    25 @ 07:01  -  25 @ 21:49  --------------------------------------------------------  IN: 120 mL / OUT: 2000 mL / NET: -1880 mL      PHYSICAL EXAM:      T(C): 36.2 (25 @ 20:16), Max: 36.6 (25 @ 23:42)  HR: 69 (25 @ 20:16) (69 - 73)  BP: 99/63 (25 @ 20:16) (99/63 - 143/78)  RR: 18 (25 @ 20:16) (17 - 18)  SpO2: 97% (25 @ 20:16) (92% - 98%)  Wt(kg): --  Lungs clear  Heart S1S2  Abd distended  Extremities:   2 edema                                    10.2   17.91 )-----------( 154      ( 26 Mar 2025 06:39 )             34.9         133[L]  |  90[L]  |  91[H]  ----------------------------<  156[H]  4.4   |  26  |  5.68[H]    Ca    8.6      26 Mar 2025 06:39  Phos  6.7       Mg     2.6               Creatinine Trend: 5.68<--, 6.75<--, 7.99<--, 7.47<--, 7.25<--, 6.75<--    Daily Weight Trend:   Weight in k (25 @ 10:45)  Weight in k (25 @ 07:40)  Weight in k.7 (25 @ 05:21)  Weight in k.8 (25 @ 10:44)        A/P:    COPD, on home O2  CM, EF ~ 30%, severe TR  Adm to [facility] VS 1/10/25 w/fluid overload   Tx to [facility] 25 for HF eval    S/p LVP 3/14 w/4.5L fluid removal, s/p SPA x 4   Dropped Hgb to 5.3 on 3/15, s/p PRBCs   Hemodynamic/cardio-renal DENISHA/CKD   Poor UO on IV Bumex    Plan  3rd HD today  TMP as tolerates   Pls arrange for perm cath w/IR  Pt also will need arrangements for CHIARA w/HD     Jon Rowe MD Student

## 2025-03-26 NOTE — PROGRESS NOTE ADULT - ASSESSMENT
79-year-old male with a past medical history significant for CAD status post CABG, heart failure, ICD placement, severe aortic stenosis status post TAVR procedure, COPD on home oxygen at 3 to 4 L at baseline, hypertension, hyperlipidemia, diabetes who was transferred from [facility] for heart failure exacerbation.    #Leukocytosis  #Plan for tunneled dialysis catheter  #Positive urine culture, Staph aureus on 3/12/2025  #Presence of cardiac hardware, ICD  #Presence of TAVR  #Ascites    Recommendations  Would monitor off antibiotics at this time  Would obtain surveillance blood cultures given previous Staph aureus growth in urine, Follow pending blood cultures  Staph could be from ro itself however when growth detected in urine - infection may be elsewhere-  patient with persistent leukocytosis and multiple potential sources of infection including ICD, TAVR  Follow fever curve and WBC count    Iván Dash MD  Division of Infectious Diseases    79-year-old male with a past medical history significant for CAD status post CABG, heart failure, ICD placement, severe aortic stenosis status post TAVR procedure, COPD on home oxygen at 3 to 4 L at baseline, hypertension, hyperlipidemia, diabetes who was transferred from [facility] for heart failure exacerbation.    #Leukocytosis  #Plan for tunneled dialysis catheter  #Positive urine culture, Staph aureus on 3/12/2025  #Presence of cardiac hardware, ICD  #Presence of TAVR  #Ascites    Recommendations  Would monitor off antibiotics at this time  Would obtain surveillance blood cultures given previous Staph aureus growth in urine, Follow pending blood cultures  Staph could be from ro itself however when growth detected in urine - infection may be elsewhere-  patient with persistent leukocytosis and multiple potential sources of infection including ICD, TAVR  Consider repeat paracentesis  Follow fever curve and WBC count    Iván Dash MD  Division of Infectious Diseases

## 2025-03-27 LAB
ALBUMIN SERPL ELPH-MCNC: 3.2 G/DL — LOW (ref 3.3–5)
ALP SERPL-CCNC: 219 U/L — HIGH (ref 40–120)
ALT FLD-CCNC: 6 U/L — LOW (ref 10–45)
ANION GAP SERPL CALC-SCNC: 17 MMOL/L — SIGNIFICANT CHANGE UP (ref 5–17)
APTT BLD: 30.7 SEC — SIGNIFICANT CHANGE UP (ref 24.5–35.6)
AST SERPL-CCNC: 33 U/L — SIGNIFICANT CHANGE UP (ref 10–40)
BILIRUB SERPL-MCNC: 0.7 MG/DL — SIGNIFICANT CHANGE UP (ref 0.2–1.2)
BLD GP AB SCN SERPL QL: NEGATIVE — SIGNIFICANT CHANGE UP
BUN SERPL-MCNC: 61 MG/DL — HIGH (ref 7–23)
CALCIUM SERPL-MCNC: 8.5 MG/DL — SIGNIFICANT CHANGE UP (ref 8.4–10.5)
CHLORIDE SERPL-SCNC: 92 MMOL/L — LOW (ref 96–108)
CO2 SERPL-SCNC: 26 MMOL/L — SIGNIFICANT CHANGE UP (ref 22–31)
CREAT SERPL-MCNC: 4.92 MG/DL — HIGH (ref 0.5–1.3)
EGFR: 11 ML/MIN/1.73M2 — LOW
EGFR: 11 ML/MIN/1.73M2 — LOW
GLUCOSE BLDC GLUCOMTR-MCNC: 135 MG/DL — HIGH (ref 70–99)
GLUCOSE BLDC GLUCOMTR-MCNC: 177 MG/DL — HIGH (ref 70–99)
GLUCOSE BLDC GLUCOMTR-MCNC: 184 MG/DL — HIGH (ref 70–99)
GLUCOSE BLDC GLUCOMTR-MCNC: 187 MG/DL — HIGH (ref 70–99)
GLUCOSE BLDC GLUCOMTR-MCNC: 201 MG/DL — HIGH (ref 70–99)
GLUCOSE SERPL-MCNC: 128 MG/DL — HIGH (ref 70–99)
HCT VFR BLD CALC: 34.1 % — LOW (ref 39–50)
HGB BLD-MCNC: 10 G/DL — LOW (ref 13–17)
INR BLD: 1.12 RATIO — SIGNIFICANT CHANGE UP (ref 0.85–1.16)
MCHC RBC-ENTMCNC: 26.5 PG — LOW (ref 27–34)
MCHC RBC-ENTMCNC: 29.3 G/DL — LOW (ref 32–36)
MCV RBC AUTO: 90.2 FL — SIGNIFICANT CHANGE UP (ref 80–100)
NRBC BLD AUTO-RTO: 0 /100 WBCS — SIGNIFICANT CHANGE UP (ref 0–0)
PLATELET # BLD AUTO: 167 K/UL — SIGNIFICANT CHANGE UP (ref 150–400)
POTASSIUM SERPL-MCNC: 4.3 MMOL/L — SIGNIFICANT CHANGE UP (ref 3.5–5.3)
POTASSIUM SERPL-SCNC: 4.3 MMOL/L — SIGNIFICANT CHANGE UP (ref 3.5–5.3)
PROT SERPL-MCNC: 6.9 G/DL — SIGNIFICANT CHANGE UP (ref 6–8.3)
PROTHROM AB SERPL-ACNC: 12.7 SEC — SIGNIFICANT CHANGE UP (ref 9.9–13.4)
RBC # BLD: 3.78 M/UL — LOW (ref 4.2–5.8)
RBC # FLD: 16.9 % — HIGH (ref 10.3–14.5)
RH IG SCN BLD-IMP: POSITIVE — SIGNIFICANT CHANGE UP
SODIUM SERPL-SCNC: 135 MMOL/L — SIGNIFICANT CHANGE UP (ref 135–145)
WBC # BLD: 16.79 K/UL — HIGH (ref 3.8–10.5)
WBC # FLD AUTO: 16.79 K/UL — HIGH (ref 3.8–10.5)

## 2025-03-27 PROCEDURE — G0545: CPT

## 2025-03-27 PROCEDURE — 99232 SBSQ HOSP IP/OBS MODERATE 35: CPT

## 2025-03-27 PROCEDURE — 99233 SBSQ HOSP IP/OBS HIGH 50: CPT

## 2025-03-27 PROCEDURE — 70450 CT HEAD/BRAIN W/O DYE: CPT | Mod: 26

## 2025-03-27 RX ORDER — ACETAMINOPHEN 500 MG/5ML
1000 LIQUID (ML) ORAL ONCE
Refills: 0 | Status: COMPLETED | OUTPATIENT
Start: 2025-03-27 | End: 2025-03-27

## 2025-03-27 RX ADMIN — ISOSORBDIE DINITRATE 10 MILLIGRAM(S): 30 TABLET ORAL at 12:43

## 2025-03-27 RX ADMIN — Medication 400 MILLIGRAM(S): at 16:36

## 2025-03-27 RX ADMIN — Medication 10 MILLIGRAM(S): at 06:24

## 2025-03-27 RX ADMIN — Medication 10 MILLIGRAM(S): at 21:47

## 2025-03-27 RX ADMIN — FOLIC ACID 1 MILLIGRAM(S): 1 TABLET ORAL at 12:43

## 2025-03-27 RX ADMIN — INSULIN LISPRO 2: 100 INJECTION, SOLUTION INTRAVENOUS; SUBCUTANEOUS at 17:16

## 2025-03-27 RX ADMIN — INSULIN LISPRO 3 UNIT(S): 100 INJECTION, SOLUTION INTRAVENOUS; SUBCUTANEOUS at 09:09

## 2025-03-27 RX ADMIN — TIOTROPIUM BROMIDE INHALATION SPRAY 2 PUFF(S): 3.12 SPRAY, METERED RESPIRATORY (INHALATION) at 12:42

## 2025-03-27 RX ADMIN — Medication 1 SPRAY(S): at 06:25

## 2025-03-27 RX ADMIN — Medication 1 PACKET(S): at 21:48

## 2025-03-27 RX ADMIN — INSULIN LISPRO 3 UNIT(S): 100 INJECTION, SOLUTION INTRAVENOUS; SUBCUTANEOUS at 17:17

## 2025-03-27 RX ADMIN — Medication 40 MILLIGRAM(S): at 05:32

## 2025-03-27 RX ADMIN — Medication 667 MILLIGRAM(S): at 09:10

## 2025-03-27 RX ADMIN — ISOSORBDIE DINITRATE 10 MILLIGRAM(S): 30 TABLET ORAL at 05:32

## 2025-03-27 RX ADMIN — Medication 40 MILLIGRAM(S): at 16:39

## 2025-03-27 RX ADMIN — Medication 1 APPLICATION(S): at 08:12

## 2025-03-27 RX ADMIN — METOPROLOL SUCCINATE 50 MILLIGRAM(S): 50 TABLET, EXTENDED RELEASE ORAL at 09:10

## 2025-03-27 RX ADMIN — INSULIN LISPRO 2: 100 INJECTION, SOLUTION INTRAVENOUS; SUBCUTANEOUS at 12:41

## 2025-03-27 RX ADMIN — Medication 1 APPLICATION(S): at 06:24

## 2025-03-27 RX ADMIN — Medication 1000 MILLIGRAM(S): at 17:15

## 2025-03-27 RX ADMIN — Medication 1 DOSE(S): at 05:32

## 2025-03-27 RX ADMIN — Medication 667 MILLIGRAM(S): at 12:43

## 2025-03-27 RX ADMIN — TAMSULOSIN HYDROCHLORIDE 0.4 MILLIGRAM(S): 0.4 CAPSULE ORAL at 21:47

## 2025-03-27 RX ADMIN — Medication 667 MILLIGRAM(S): at 16:39

## 2025-03-27 RX ADMIN — INSULIN GLARGINE-YFGN 18 UNIT(S): 100 INJECTION, SOLUTION SUBCUTANEOUS at 21:47

## 2025-03-27 RX ADMIN — ATORVASTATIN CALCIUM 20 MILLIGRAM(S): 80 TABLET, FILM COATED ORAL at 21:47

## 2025-03-27 RX ADMIN — Medication 1 DOSE(S): at 16:40

## 2025-03-27 RX ADMIN — ISOSORBDIE DINITRATE 10 MILLIGRAM(S): 30 TABLET ORAL at 21:48

## 2025-03-27 RX ADMIN — Medication 10 MILLIGRAM(S): at 12:43

## 2025-03-27 RX ADMIN — Medication 100 MILLIGRAM(S): at 12:44

## 2025-03-27 RX ADMIN — INSULIN LISPRO 3 UNIT(S): 100 INJECTION, SOLUTION INTRAVENOUS; SUBCUTANEOUS at 12:42

## 2025-03-27 NOTE — PROVIDER CONTACT NOTE (OTHER) - NAME OF MD/NP/PA/DO NOTIFIED:
ELIZABETH Jean-Baptiste
Kathy Patel
ELIZABETH Lowery
Jon, [facility]
Monty Patten
Kaylee JOHNSON
Sindi JOHNSON
GENET Gordon, NP
Ciera Maher NP
ELIZABETH RUIZ
JAYDEN Jean-Baptiste
Laurita GUZMAN)
Sapna Dewey
MAGO Cottrell
sarahi Chicas

## 2025-03-27 NOTE — PROGRESS NOTE ADULT - ASSESSMENT
78 y/o male w/ PMHx CAD s/p CABG, HF (combined HFrEF [EF 25%] and HFpEF [G3DD]) w/ ICD, AFib on Xarelto, severe AS s/p TAVR, COPD on 3-4L home O2, CKD4, HTN, HLD, T2DM, and BPH who presents as a transfer from [facility] for HF evaluation. Ongoing CHF exacerbation now off bumex gtt, new DENISHA on CKD likely 2/2 ongoing cardiorenal and possible component of abdominal compartment syndrome given tremendous ascites, now plan for shiley/HD

## 2025-03-27 NOTE — PROGRESS NOTE ADULT - SUBJECTIVE AND OBJECTIVE BOX
Follow Up:  leukocytoss    Interval History/ROS:    Overnight: No acute events.  Patient remains afebrile.  Otherwise hemodynamically stable latest labs show leukocytosis of 16.8, anemia 10/34.1.  BMP with elevated creatinine of 4.82 in setting of ESRD.  Blood cultures are pending.    Patient seen and examined at bedside. no new complaints.    Allergies  No Known Allergies        ANTIMICROBIALS:      OTHER MEDS:  MEDICATIONS  (STANDING):  acetaminophen     Tablet .. 650 every 6 hours PRN  acetaminophen   IVPB .. 1000 once  albuterol/ipratropium for Nebulization 3 every 6 hours PRN  allopurinol 100 daily  atorvastatin 20 at bedtime  bisacodyl 5 every 12 hours PRN  dextrose 50% Injectable 25 once  dextrose 50% Injectable 12.5 once  dextrose 50% Injectable 25 once  dextrose Oral Gel 15 once PRN  dextrose Oral Gel 15 once PRN  epoetin lashawn (EPOGEN) Injectable 47836 <User Schedule>  fluticasone propionate/ salmeterol 250-50 MICROgram(s) Diskus 1 two times a day  glucagon  Injectable 1 once  hydrALAZINE 10 three times a day  insulin glargine Injectable (LANTUS) 18 at bedtime  insulin lispro (ADMELOG) corrective regimen sliding scale  three times a day before meals  insulin lispro Injectable (ADMELOG) 3 three times a day before meals  isosorbide   dinitrate Tablet (ISORDIL) 10 three times a day  melatonin 3 at bedtime PRN  metoprolol succinate ER 50 daily  ondansetron Injectable 4 every 6 hours PRN  pantoprazole    Tablet 40 every 12 hours  polyethylene glycol 3350 17 two times a day  psyllium Powder 1 daily  tamsulosin 0.4 at bedtime  tiotropium 2.5 MICROgram(s) Inhaler 2 daily      Vital Signs Last 24 Hrs  T(C): 36.4 (27 Mar 2025 11:31), Max: 36.4 (27 Mar 2025 04:31)  T(F): 97.5 (27 Mar 2025 11:31), Max: 97.5 (27 Mar 2025 04:31)  HR: 70 (27 Mar 2025 11:31) (69 - 72)  BP: 104/64 (27 Mar 2025 11:31) (99/63 - 114/66)  BP(mean): 77 (27 Mar 2025 11:31) (77 - 77)  RR: 18 (27 Mar 2025 11:31) (18 - 18)  SpO2: 94% (27 Mar 2025 11:31) (94% - 98%)    Parameters below as of 27 Mar 2025 11:31  Patient On (Oxygen Delivery Method): nasal cannula        PHYSICAL EXAM:  Constitutional: non-toxic, no distress  HEAD/EYES: anicteric, no conjunctival injection  ENT:  supple, no thrush  Cardiovascular:   normal S1, S2, no murmur, no edema  Respiratory:  clear BS bilaterally, no wheezes, no rales  GI: distended, tense - ascites present  :  ro+, no CVA tenderness  Musculoskeletal:  no synovitis, normal ROM  Neurologic: awake and alert, normal strength, no focal findings  Skin:  ICD site without evidence for infection, no phlebitis  Heme/Onc: no lymphadenopathy   Psychiatric:  awake, alert, appropriate mood                                  10.0   16.79 )-----------( 167      ( 27 Mar 2025 02:44 )             34.1       03-27    135  |  92[L]  |  61[H]  ----------------------------<  128[H]  4.3   |  26  |  4.92[H]    Ca    8.5      27 Mar 2025 02:44  Phos  6.7     03-26  Mg     2.6     03-26    TPro  6.9  /  Alb  3.2[L]  /  TBili  0.7  /  DBili  x   /  AST  33  /  ALT  6[L]  /  AlkPhos  219[H]  03-27      Urinalysis Basic - ( 27 Mar 2025 02:44 )    Color: x / Appearance: x / SG: x / pH: x  Gluc: 128 mg/dL / Ketone: x  / Bili: x / Urobili: x   Blood: x / Protein: x / Nitrite: x   Leuk Esterase: x / RBC: x / WBC x   Sq Epi: x / Non Sq Epi: x / Bacteria: x        MICROBIOLOGY:  v                  RADIOLOGY:  Imaging reviewed

## 2025-03-27 NOTE — PROGRESS NOTE ADULT - SUBJECTIVE AND OBJECTIVE BOX
Patient is a 79y old  Male who presents with a chief complaint of HF eval (26 Mar 2025 21:49)      SUBJECTIVE / OVERNIGHT EVENTS: pt sleeping, wife at bedside     MEDICATIONS  (STANDING):  allopurinol 100 milliGRAM(s) Oral daily  atorvastatin 20 milliGRAM(s) Oral at bedtime  calcium acetate 667 milliGRAM(s) Oral three times a day with meals  chlorhexidine 2% Cloths 1 Application(s) Topical daily  chlorhexidine 4% Liquid 1 Application(s) Topical <User Schedule>  dextrose 5%. 1000 milliLiter(s) (100 mL/Hr) IV Continuous <Continuous>  dextrose 5%. 1000 milliLiter(s) (50 mL/Hr) IV Continuous <Continuous>  dextrose 50% Injectable 25 Gram(s) IV Push once  dextrose 50% Injectable 12.5 Gram(s) IV Push once  dextrose 50% Injectable 25 Gram(s) IV Push once  epoetin lashawn (EPOGEN) Injectable 82332 Unit(s) IV Push <User Schedule>  fluticasone propionate/ salmeterol 250-50 MICROgram(s) Diskus 1 Dose(s) Inhalation two times a day  folic acid 1 milliGRAM(s) Oral daily  glucagon  Injectable 1 milliGRAM(s) IntraMuscular once  hydrALAZINE 10 milliGRAM(s) Oral three times a day  insulin glargine Injectable (LANTUS) 18 Unit(s) SubCutaneous at bedtime  insulin lispro (ADMELOG) corrective regimen sliding scale   SubCutaneous three times a day before meals  insulin lispro Injectable (ADMELOG) 3 Unit(s) SubCutaneous three times a day before meals  isosorbide   dinitrate Tablet (ISORDIL) 10 milliGRAM(s) Oral three times a day  metoprolol succinate ER 50 milliGRAM(s) Oral daily  pantoprazole    Tablet 40 milliGRAM(s) Oral every 12 hours  polyethylene glycol 3350 17 Gram(s) Oral two times a day  psyllium Powder 1 Packet(s) Oral daily  sodium chloride 0.65% Nasal 1 Spray(s) Both Nostrils two times a day  tamsulosin 0.4 milliGRAM(s) Oral at bedtime  tiotropium 2.5 MICROgram(s) Inhaler 2 Puff(s) Inhalation daily    MEDICATIONS  (PRN):  acetaminophen     Tablet .. 650 milliGRAM(s) Oral every 6 hours PRN Temp greater or equal to 38C (100.4F), Mild Pain (1 - 3)  albuterol/ipratropium for Nebulization 3 milliLiter(s) Nebulizer every 6 hours PRN Shortness of Breath and/or Wheezing  bisacodyl 5 milliGRAM(s) Oral every 12 hours PRN Constipation  dextrose Oral Gel 15 Gram(s) Oral once PRN Blood Glucose LESS THAN 70 milliGRAM(s)/deciliter  dextrose Oral Gel 15 Gram(s) Oral once PRN Blood Glucose LESS THAN 70 milliGRAM(s)/deciliter  melatonin 3 milliGRAM(s) Oral at bedtime PRN Insomnia  ondansetron Injectable 4 milliGRAM(s) IV Push every 6 hours PRN Nausea and/or Vomiting  sodium chloride 0.9% lock flush 10 milliLiter(s) IV Push every 1 hour PRN Pre/post blood products, medications, blood draw, and to maintain line patency        CAPILLARY BLOOD GLUCOSE      POCT Blood Glucose.: 135 mg/dL (27 Mar 2025 08:22)  POCT Blood Glucose.: 136 mg/dL (26 Mar 2025 21:18)  POCT Blood Glucose.: 132 mg/dL (26 Mar 2025 17:27)  POCT Blood Glucose.: 137 mg/dL (26 Mar 2025 12:35)    I&O's Summary    26 Mar 2025 07:01  -  27 Mar 2025 07:00  --------------------------------------------------------  IN: 120 mL / OUT: 2150 mL / NET: -2030 mL        PHYSICAL EXAM:  CONSTITUTIONAL: NAD  EYES: PERRLA; conjunctiva and sclera clear  NECK: Supple  RESPIRATORY: Normal respiratory effort; CTAB  CARDIOVASCULAR: RRR, S1S2  ABDOMEN: distended  MUSCLOSKELETAL:  anasarca  PSYCH: A+O x 2-3, affect normal  NEUROLOGY: CN 2-12 are intact     LABS:                        10.0   16.79 )-----------( 167      ( 27 Mar 2025 02:44 )             34.1     03-27    135  |  92[L]  |  61[H]  ----------------------------<  128[H]  4.3   |  26  |  4.92[H]    Ca    8.5      27 Mar 2025 02:44  Phos  6.7     03-26  Mg     2.6     03-26    TPro  6.9  /  Alb  3.2[L]  /  TBili  0.7  /  DBili  x   /  AST  33  /  ALT  6[L]  /  AlkPhos  219[H]  03-27    PT/INR - ( 27 Mar 2025 02:44 )   PT: 12.7 sec;   INR: 1.12 ratio         PTT - ( 27 Mar 2025 02:44 )  PTT:30.7 sec      Urinalysis Basic - ( 27 Mar 2025 02:44 )    Color: x / Appearance: x / SG: x / pH: x  Gluc: 128 mg/dL / Ketone: x  / Bili: x / Urobili: x   Blood: x / Protein: x / Nitrite: x   Leuk Esterase: x / RBC: x / WBC x   Sq Epi: x / Non Sq Epi: x / Bacteria: x        RADIOLOGY & ADDITIONAL TESTS:    Imaging Personally Reviewed:    Consultant(s) Notes Reviewed:      Care Discussed with Consultants/Other Providers:

## 2025-03-27 NOTE — PROGRESS NOTE ADULT - PROBLEM SELECTOR PLAN 3
- EF 30% with severe tricuspid regurg and PASP 55mmHG on 2/7/24. Dry wt is 243 lbs.   - s/p bumex 4mg iv TID   - GDMT:  Metoprolol succinate 50mg day. Holding entresto and aldactone for DENISHA. Not on SGLT2.  - Hydralazine/Nitrate: hydral 50mg TID. Isordil 20mg TID   - Cardiology following, patient declined RHC and cardiomems  - per GOC with wife; pt dnr/dni but they wish to keep ICD active  - remove ro

## 2025-03-27 NOTE — CHART NOTE - NSCHARTNOTEFT_GEN_A_CORE
Pt scheduled for permcath today however Bcx were sent yesterday around 1230pm, per ID note Would favor 48 hours negative prior to insertion of permcath.   -will tentatively reschedule for tomorrow pending negative blood cutlures  -d/w primary team Pt scheduled for permcath today however Bcx were sent yesterday around 1230pm, per ID note Would favor 48 hours negative prior to insertion of permcath.   -will tentatively reschedule for tomorrow pending negative blood cultures  - please place IR procedure order under ASHVIN Hopson  - STAT labs in AM (cbc,coags, bmp, T&S)  - pt not on ac; if plans to start please reach out to IR for recommendations  - NPO tonight at 11pm  - d/w primary team

## 2025-03-27 NOTE — PROGRESS NOTE ADULT - ASSESSMENT
79-year-old male with a past medical history significant for CAD status post CABG, heart failure, ICD placement, severe aortic stenosis status post TAVR procedure, COPD on home oxygen at 3 to 4 L at baseline, hypertension, hyperlipidemia, diabetes who was transferred from [facility] for heart failure exacerbation.    #Leukocytosis  #Plan for tunneled dialysis catheter  #Positive urine culture, Staph aureus on 3/12/2025  #Presence of cardiac hardware, ICD  #Presence of TAVR  #Ascites    Recommendations  Would monitor off antibiotics at this time  Follow pending blood cultures  Would favor 48 hours negative prior to insertion of permacath  Staph could be from ro itself however when growth detected in urine - infection may be elsewhere-  patient with persistent leukocytosis and multiple potential sources of infection including ICD, TAVR  Consider repeat paracentesis  Follow fever curve and WBC count    Iván Dash MD  Division of Infectious Diseases

## 2025-03-27 NOTE — PROGRESS NOTE ADULT - PROBLEM SELECTOR PLAN 1
- Likely DENISHA in setting of cardiorenal syndrome  - Continue monitoring Cr and urine output.   - now on HD  - S/p jackie 3/24  - plan for permacath thursday   - renal follow up

## 2025-03-27 NOTE — CHART NOTE - NSCHARTNOTEFT_GEN_A_CORE
NUTRITION FOLLOW UP NOTE    PATIENT SEEN FOR: Consult for nutrition support, NPO follow up    SOURCE: [] Patient  [x] Current Medical Record  [x] RN  [] Family/support person at bedside  [x] Patient unavailable/inappropriate: patient declined interview secondary to lethargy  [] Other:    CHART REVIEWED/EVENTS NOTED.  [] No changes to nutrition care plan to note  [x] Nutrition Status:  - Acute kidney injury superimposed on chronic kidney disease, now on hemodialysis, last hemodialysis was (3/26)     DIET ORDER:   Diet, Regular:   Consistent Carbohydrate {No Snacks} (CSTCHO)  DASH/TLC {Sodium & Cholesterol Restricted} (DASH)  1000mL Fluid Restriction ([id number])  No Concentrated Phosphorus  Supplement Feeding Modality:  Oral  Nepro Cans or Servings Per Day:  3       Frequency:  Three Times a day (25)  Diet, NPO:   NPO for Procedure/Test     NPO Start Date: 26-Mar-2025,   NPO Start Time: 23:00  Except Medications (25)    CURRENT DIET ORDER IS:  [] Appropriate:  [] Inadequate:  [x] Other: see recommendations below.     NUTRITION INTAKE/PROVISION:  [x] PO: Pt visited at the bedside. Pt declined interview secondary to lethargy. Per discussion with RN, patient noted with inadequate appetite/PO intake in-house. Reports patient eats when wife is here. Pt noted consumes range from 0%-100% per flowsheet. RN unsure if patient is drinking any Nepro. RN unsure if patient has any chewing/swallowing difficulties at this time. No other GI distress reported. No recent bowel movement documented.   [] Enteral Nutrition:  [] Parenteral Nutrition:    ANTHROPOMETRICS:  Drug Dosing Weight  Height (cm): 177.8 (10 Tay 2025 15:59)  Weight (kg): 107 (24 Mar 2025 13:02)  BMI (kg/m2): 33.8 (24 Mar 2025 13:02)  BSA (m2): 2.24 (24 Mar 2025 13:02)    **Weights: Daily Weight in k.8 (), Weight in k (), Weight in k (), Weight in k.7 (), Weight in k.8 (), Weight in k.8 (03-25), Weight in k.6 (03-25)   Weight fluctuations likely in setting of fluid shifts and/or scale discrepancies. RD will continue to monitor weight trends as available/able.     MEDICATIONS:  MEDICATIONS  (STANDING):  acetaminophen   IVPB .. 1000 milliGRAM(s) IV Intermittent once  allopurinol 100 milliGRAM(s) Oral daily  atorvastatin 20 milliGRAM(s) Oral at bedtime  calcium acetate 667 milliGRAM(s) Oral three times a day with meals  chlorhexidine 2% Cloths 1 Application(s) Topical daily  chlorhexidine 4% Liquid 1 Application(s) Topical <User Schedule>  dextrose 5%. 1000 milliLiter(s) (100 mL/Hr) IV Continuous <Continuous>  dextrose 5%. 1000 milliLiter(s) (50 mL/Hr) IV Continuous <Continuous>  dextrose 50% Injectable 25 Gram(s) IV Push once  dextrose 50% Injectable 12.5 Gram(s) IV Push once  dextrose 50% Injectable 25 Gram(s) IV Push once  epoetin lashawn (EPOGEN) Injectable 16949 Unit(s) IV Push <User Schedule>  fluticasone propionate/ salmeterol 250-50 MICROgram(s) Diskus 1 Dose(s) Inhalation two times a day  folic acid 1 milliGRAM(s) Oral daily  glucagon  Injectable 1 milliGRAM(s) IntraMuscular once  hydrALAZINE 10 milliGRAM(s) Oral three times a day  insulin glargine Injectable (LANTUS) 18 Unit(s) SubCutaneous at bedtime  insulin lispro (ADMELOG) corrective regimen sliding scale   SubCutaneous three times a day before meals  insulin lispro Injectable (ADMELOG) 3 Unit(s) SubCutaneous three times a day before meals  isosorbide   dinitrate Tablet (ISORDIL) 10 milliGRAM(s) Oral three times a day  metoprolol succinate ER 50 milliGRAM(s) Oral daily  pantoprazole    Tablet 40 milliGRAM(s) Oral every 12 hours  polyethylene glycol 3350 17 Gram(s) Oral two times a day  psyllium Powder 1 Packet(s) Oral daily  sodium chloride 0.65% Nasal 1 Spray(s) Both Nostrils two times a day  tamsulosin 0.4 milliGRAM(s) Oral at bedtime  tiotropium 2.5 MICROgram(s) Inhaler 2 Puff(s) Inhalation daily    MEDICATIONS  (PRN):  acetaminophen     Tablet .. 650 milliGRAM(s) Oral every 6 hours PRN Temp greater or equal to 38C (100.4F), Mild Pain (1 - 3)  albuterol/ipratropium for Nebulization 3 milliLiter(s) Nebulizer every 6 hours PRN Shortness of Breath and/or Wheezing  bisacodyl 5 milliGRAM(s) Oral every 12 hours PRN Constipation  dextrose Oral Gel 15 Gram(s) Oral once PRN Blood Glucose LESS THAN 70 milliGRAM(s)/deciliter  dextrose Oral Gel 15 Gram(s) Oral once PRN Blood Glucose LESS THAN 70 milliGRAM(s)/deciliter  melatonin 3 milliGRAM(s) Oral at bedtime PRN Insomnia  ondansetron Injectable 4 milliGRAM(s) IV Push every 6 hours PRN Nausea and/or Vomiting  sodium chloride 0.9% lock flush 10 milliLiter(s) IV Push every 1 hour PRN Pre/post blood products, medications, blood draw, and to maintain line patency    NUTRITIONALLY PERTINENT LABS:   Na135 mmol/L Glu 128 mg/dL[H] K+ 4.3 mmol/L Cr  4.92 mg/dL[H] BUN 61 mg/dL[H]  Phos 6.7 mg/dL[H]  Alb 3.2 g/dL[L] ALT 6 U/L[L] AST 33 U/L Alkaline Phosphatase 219 U/L[H]    A1C with Estimated Average Glucose Result: 7.2 % (25 @ 07:55)    Finger Sticks:  POCT Blood Glucose.: 201 mg/dL ( @ 15:00)  POCT Blood Glucose.: 184 mg/dL ( @ 12:30)  POCT Blood Glucose.: 135 mg/dL ( @ 08:22)  POCT Blood Glucose.: 136 mg/dL ( @ 21:18)  POCT Blood Glucose.: 132 mg/dL ( @ 17:27)    NUTRITIONALLY PERTINENT MEDICATIONS/LABS:  [x] Reviewed  [x] Relevant notes on medications/labs:  - Endo: ordered for insulin for glycemic control   - Renal: hyperphosphatemia noted, ordered for phoslo  - Ordered for folic acid    EDEMA:  [x] Reviewed  [x] Relevant notes: +3 generalized, +4 scrotum edema per flowsheet.     GI/ I&O:  [x] Reviewed  [x] Relevant notes: - GI: ordered for dulcolax, miralax, metamucil  [] Other:    SKIN:   [x] No pressure injuries documented, per nursing flowsheet  [] Pressure injury previously noted  [] Change in pressure injury documentation:  [] Other:    ESTIMATED NEEDS:  [] No change:  [x] Updated:  Energy: 0485-0821.5  kcal/day (28-33 kcal/kg)  Protein:   .5g/day (1.2-1.4 g/kg)  Fluid:   ml/day or [x] defer to team  Based on: IBW: 77.5 kg    NUTRITION DIAGNOSIS:  [x] Prior Dx:  1) Food & Nutrition Related Knowledge Deficit; 2) Inadequate Protein Energy Intake  [x] New Dx: Increased nutrient needs (protein and kcal needs) related to related to increased metabolic and physical demand as evidenced by acute kidney injury on chronic kidney disease now on hemodialysis     Goal: Patient will meet >75% estimated nutrient needs as tolerated during hospital stay.     EDUCATION:  [] Yes:  [x] Not appropriate/warranted: deferred at this time    NUTRITION CARE PLAN:  1. Diet: Recommend change to Renal diet with Carbohydrate Consistent Diet with no snacks as medically appropriate, continue 1000 ml Fluid restriction as ordered  2. Supplements: Continue Nepro 3x daily (1,275 kcals, 57 g protein) to optimize PO intake  3. Multivitamin/mineral supplementation: Continue folic acid, consider Nephro-neelam pending no medical contraindications   4: Continue to monitor PO intake, weight trend, electrolytes, blood glucose, labs, BMs in-house   5. Pt is at risk for malnutrition, RD will continue to monitor as able    [] Achieved - Continue current nutrition intervention(s)  [] Current medical condition precludes nutrition intervention at this time.    MONITORING AND EVALUATION:   RD remains available upon request and will follow up per protocol.    Name  Teagan Urban, SUMA, CDN / TEAMS Available on MS TEAMS

## 2025-03-27 NOTE — PROGRESS NOTE ADULT - SUBJECTIVE AND OBJECTIVE BOX
Events noted    Vital Signs Last 24 Hrs  T(C): 36.4 (03-27-25 @ 11:31), Max: 36.4 (03-27-25 @ 04:31)  T(F): 97.5 (03-27-25 @ 11:31), Max: 97.5 (03-27-25 @ 04:31)  HR: 70 (03-27-25 @ 11:31) (69 - 71)  BP: 104/64 (03-27-25 @ 11:31) (99/63 - 104/64)  BP(mean): 77 (03-27-25 @ 11:31) (77 - 77)  RR: 18 (03-27-25 @ 11:31) (18 - 18)  SpO2: 94% (03-27-25 @ 11:31) (94% - 98%)    I&O's Detail    26 Mar 2025 07:01  -  27 Mar 2025 07:00  --------------------------------------------------------  OUT:    Indwelling Catheter - Urethral (mL): 150 mL    Other (mL): 2000 mL  Total OUT: 2150 mL    s1s2  b/l air entry  soft, ascites   edema                                                                                                                                                           10.0   16.79 )-----------( 167      ( 27 Mar 2025 02:44 )             34.1     27 Mar 2025 02:44    135    |  92     |  61     ----------------------------<  128    4.3     |  26     |  4.92     Ca    8.5        27 Mar 2025 02:44  Phos  6.7       26 Mar 2025 06:39  Mg     2.6       26 Mar 2025 06:39    TPro  6.9    /  Alb  3.2    /  TBili  0.7    /  DBili  x      /  AST  33     /  ALT  6      /  AlkPhos  219    27 Mar 2025 02:44    LIVER FUNCTIONS - ( 27 Mar 2025 02:44 )  Alb: 3.2 g/dL / Pro: 6.9 g/dL / ALK PHOS: 219 U/L / ALT: 6 U/L / AST: 33 U/L / GGT: x           PT/INR - ( 27 Mar 2025 02:44 )   PT: 12.7 sec;   INR: 1.12 ratio      acetaminophen     Tablet .. 650 milliGRAM(s) Oral every 6 hours PRN  albuterol/ipratropium for Nebulization 3 milliLiter(s) Nebulizer every 6 hours PRN  allopurinol 100 milliGRAM(s) Oral daily  atorvastatin 20 milliGRAM(s) Oral at bedtime  bisacodyl 5 milliGRAM(s) Oral every 12 hours PRN  calcium acetate 667 milliGRAM(s) Oral three times a day with meals  chlorhexidine 2% Cloths 1 Application(s) Topical daily  chlorhexidine 4% Liquid 1 Application(s) Topical <User Schedule>  dextrose 5%. 1000 milliLiter(s) IV Continuous <Continuous>  dextrose 5%. 1000 milliLiter(s) IV Continuous <Continuous>  dextrose 50% Injectable 25 Gram(s) IV Push once  dextrose 50% Injectable 12.5 Gram(s) IV Push once  dextrose 50% Injectable 25 Gram(s) IV Push once  dextrose Oral Gel 15 Gram(s) Oral once PRN  dextrose Oral Gel 15 Gram(s) Oral once PRN  epoetin lashawn (EPOGEN) Injectable 63244 Unit(s) IV Push <User Schedule>  fluticasone propionate/ salmeterol 250-50 MICROgram(s) Diskus 1 Dose(s) Inhalation two times a day  folic acid 1 milliGRAM(s) Oral daily  glucagon  Injectable 1 milliGRAM(s) IntraMuscular once  hydrALAZINE 10 milliGRAM(s) Oral three times a day  insulin glargine Injectable (LANTUS) 18 Unit(s) SubCutaneous at bedtime  insulin lispro (ADMELOG) corrective regimen sliding scale   SubCutaneous three times a day before meals  insulin lispro Injectable (ADMELOG) 3 Unit(s) SubCutaneous three times a day before meals  isosorbide   dinitrate Tablet (ISORDIL) 10 milliGRAM(s) Oral three times a day  melatonin 3 milliGRAM(s) Oral at bedtime PRN  metoprolol succinate ER 50 milliGRAM(s) Oral daily  ondansetron Injectable 4 milliGRAM(s) IV Push every 6 hours PRN  pantoprazole    Tablet 40 milliGRAM(s) Oral every 12 hours  polyethylene glycol 3350 17 Gram(s) Oral two times a day  psyllium Powder 1 Packet(s) Oral daily  sodium chloride 0.65% Nasal 1 Spray(s) Both Nostrils two times a day  sodium chloride 0.9% lock flush 10 milliLiter(s) IV Push every 1 hour PRN  tamsulosin 0.4 milliGRAM(s) Oral at bedtime  tiotropium 2.5 MICROgram(s) Inhaler 2 Puff(s) Inhalation daily    A/P:    COPD, on home O2  CM, EF ~ 30%, severe TR  Adm to [facility] VS 1/10/25 w/fluid overload   Tx to [facility] 2/5/25 for HF eval    S/p LVP 3/14 w/4.5L fluid removal, s/p SPA x 4   Dropped Hgb to 5.3 on 3/15, s/p PRBCs   Hemodynamic/cardio-renal DENISHA/CKD   Poor UO on IV Bumex  Started on HD 3/24   Next HD tomorrow  TMP as tolerates   Perm cath w/IR once cleared by ID  Pt also will need arrangements for [facility] w/HD     [phone]

## 2025-03-28 LAB
ANION GAP SERPL CALC-SCNC: 15 MMOL/L — SIGNIFICANT CHANGE UP (ref 5–17)
BUN SERPL-MCNC: 77 MG/DL — HIGH (ref 7–23)
CALCIUM SERPL-MCNC: 8.8 MG/DL — SIGNIFICANT CHANGE UP (ref 8.4–10.5)
CHLORIDE SERPL-SCNC: 92 MMOL/L — LOW (ref 96–108)
CO2 SERPL-SCNC: 25 MMOL/L — SIGNIFICANT CHANGE UP (ref 22–31)
CREAT SERPL-MCNC: 5.92 MG/DL — HIGH (ref 0.5–1.3)
EGFR: 9 ML/MIN/1.73M2 — LOW
EGFR: 9 ML/MIN/1.73M2 — LOW
GAMMA INTERFERON BACKGROUND BLD IA-ACNC: 0.02 IU/ML — SIGNIFICANT CHANGE UP
GLUCOSE BLDC GLUCOMTR-MCNC: 114 MG/DL — HIGH (ref 70–99)
GLUCOSE BLDC GLUCOMTR-MCNC: 141 MG/DL — HIGH (ref 70–99)
GLUCOSE BLDC GLUCOMTR-MCNC: 155 MG/DL — HIGH (ref 70–99)
GLUCOSE BLDC GLUCOMTR-MCNC: 168 MG/DL — HIGH (ref 70–99)
GLUCOSE BLDC GLUCOMTR-MCNC: 173 MG/DL — HIGH (ref 70–99)
GLUCOSE SERPL-MCNC: 160 MG/DL — HIGH (ref 70–99)
HCT VFR BLD CALC: 34.6 % — LOW (ref 39–50)
HGB BLD-MCNC: 10.5 G/DL — LOW (ref 13–17)
M TB IFN-G BLD-IMP: ABNORMAL
M TB IFN-G CD4+ BCKGRND COR BLD-ACNC: 0 IU/ML — SIGNIFICANT CHANGE UP
M TB IFN-G CD4+CD8+ BCKGRND COR BLD-ACNC: 0 IU/ML — SIGNIFICANT CHANGE UP
MAGNESIUM SERPL-MCNC: 2.6 MG/DL — SIGNIFICANT CHANGE UP (ref 1.6–2.6)
MCHC RBC-ENTMCNC: 27.3 PG — SIGNIFICANT CHANGE UP (ref 27–34)
MCHC RBC-ENTMCNC: 30.3 G/DL — LOW (ref 32–36)
MCV RBC AUTO: 90.1 FL — SIGNIFICANT CHANGE UP (ref 80–100)
NRBC BLD AUTO-RTO: 0 /100 WBCS — SIGNIFICANT CHANGE UP (ref 0–0)
PHOSPHATE SERPL-MCNC: 5.9 MG/DL — HIGH (ref 2.5–4.5)
PLATELET # BLD AUTO: 167 K/UL — SIGNIFICANT CHANGE UP (ref 150–400)
POTASSIUM SERPL-MCNC: 4.2 MMOL/L — SIGNIFICANT CHANGE UP (ref 3.5–5.3)
POTASSIUM SERPL-SCNC: 4.2 MMOL/L — SIGNIFICANT CHANGE UP (ref 3.5–5.3)
QUANT TB PLUS MITOGEN MINUS NIL: 0.18 IU/ML — SIGNIFICANT CHANGE UP
RBC # BLD: 3.84 M/UL — LOW (ref 4.2–5.8)
RBC # FLD: 17.2 % — HIGH (ref 10.3–14.5)
SODIUM SERPL-SCNC: 132 MMOL/L — LOW (ref 135–145)
WBC # BLD: 15.58 K/UL — HIGH (ref 3.8–10.5)
WBC # FLD AUTO: 15.58 K/UL — HIGH (ref 3.8–10.5)

## 2025-03-28 PROCEDURE — 36558 INSERT TUNNELED CV CATH: CPT | Mod: RT

## 2025-03-28 PROCEDURE — 99233 SBSQ HOSP IP/OBS HIGH 50: CPT

## 2025-03-28 PROCEDURE — G0545: CPT

## 2025-03-28 PROCEDURE — 77001 FLUOROGUIDE FOR VEIN DEVICE: CPT | Mod: 26

## 2025-03-28 PROCEDURE — 76937 US GUIDE VASCULAR ACCESS: CPT | Mod: 26

## 2025-03-28 PROCEDURE — 99232 SBSQ HOSP IP/OBS MODERATE 35: CPT

## 2025-03-28 RX ORDER — METOPROLOL SUCCINATE 50 MG/1
25 TABLET, EXTENDED RELEASE ORAL DAILY
Refills: 0 | Status: DISCONTINUED | OUTPATIENT
Start: 2025-03-29 | End: 2025-04-14

## 2025-03-28 RX ORDER — FENTANYL CITRATE-0.9 % NACL/PF 100MCG/2ML
25 SYRINGE (ML) INTRAVENOUS
Refills: 0 | Status: DISCONTINUED | OUTPATIENT
Start: 2025-03-28 | End: 2025-04-03

## 2025-03-28 RX ORDER — OXYCODONE HYDROCHLORIDE 30 MG/1
5 TABLET ORAL ONCE
Refills: 0 | Status: DISCONTINUED | OUTPATIENT
Start: 2025-03-28 | End: 2025-04-03

## 2025-03-28 RX ORDER — ACETAMINOPHEN 500 MG/5ML
1000 LIQUID (ML) ORAL ONCE
Refills: 0 | Status: COMPLETED | OUTPATIENT
Start: 2025-03-28 | End: 2025-03-28

## 2025-03-28 RX ORDER — ONDANSETRON HCL/PF 4 MG/2 ML
4 VIAL (ML) INJECTION ONCE
Refills: 0 | Status: COMPLETED | OUTPATIENT
Start: 2025-03-28 | End: 2025-04-13

## 2025-03-28 RX ADMIN — INSULIN GLARGINE-YFGN 18 UNIT(S): 100 INJECTION, SOLUTION SUBCUTANEOUS at 21:18

## 2025-03-28 RX ADMIN — POLYETHYLENE GLYCOL 3350 17 GRAM(S): 17 POWDER, FOR SOLUTION ORAL at 05:11

## 2025-03-28 RX ADMIN — Medication 1 SPRAY(S): at 05:11

## 2025-03-28 RX ADMIN — Medication 1 DOSE(S): at 05:11

## 2025-03-28 RX ADMIN — ATORVASTATIN CALCIUM 20 MILLIGRAM(S): 80 TABLET, FILM COATED ORAL at 21:12

## 2025-03-28 RX ADMIN — TAMSULOSIN HYDROCHLORIDE 0.4 MILLIGRAM(S): 0.4 CAPSULE ORAL at 21:13

## 2025-03-28 RX ADMIN — Medication 40 MILLIGRAM(S): at 05:11

## 2025-03-28 RX ADMIN — Medication 400 MILLIGRAM(S): at 23:53

## 2025-03-28 RX ADMIN — Medication 3 MILLIGRAM(S): at 22:29

## 2025-03-28 RX ADMIN — Medication 1 PACKET(S): at 21:13

## 2025-03-28 RX ADMIN — EPOETIN ALFA 10000 UNIT(S): 10000 SOLUTION INTRAVENOUS; SUBCUTANEOUS at 18:36

## 2025-03-28 RX ADMIN — Medication 1 APPLICATION(S): at 07:24

## 2025-03-28 RX ADMIN — Medication 1 APPLICATION(S): at 05:50

## 2025-03-28 NOTE — PROGRESS NOTE ADULT - ASSESSMENT
79-year-old male with a past medical history significant for CAD status post CABG, heart failure, ICD placement, severe aortic stenosis status post TAVR procedure, COPD on home oxygen at 3 to 4 L at baseline, hypertension, hyperlipidemia, diabetes who was transferred from [facility] for heart failure exacerbation.    #Leukocytosis  #Plan for tunneled dialysis catheter  #Positive urine culture, Staph aureus on 3/12/2025  #Presence of cardiac hardware, ICD  #Presence of TAVR  #Ascites    Recommendations  Would continue to monitor off antibiotics at this time  Follow pending blood cultures, negative to date  Would favor 48 hours negative prior to insertion of permacath  Staph could be from ro itself however when growth detected in urine - infection may be elsewhere-  patient with persistent leukocytosis and multiple potential sources of infection including ICD, TAVR  Consider repeat paracentesis  Follow fever curve and WBC count    Iván Dash MD  Division of Infectious Diseases

## 2025-03-28 NOTE — PROGRESS NOTE ADULT - SUBJECTIVE AND OBJECTIVE BOX
MS at baseline today     Vital Signs Last 24 Hrs  T(C): 36.3 (03-28-25 @ 05:02), Max: 36.4 (03-27-25 @ 11:31)  T(F): 97.3 (03-28-25 @ 05:02), Max: 97.5 (03-27-25 @ 11:31)  HR: 82 (03-28-25 @ 05:02) (70 - 82)  BP: 92/46 (03-28-25 @ 05:02) (92/46 - 108/67)  BP(mean): 77 (03-27-25 @ 11:31) (77 - 77)  RR: 18 (03-28-25 @ 05:02) (18 - 18)  SpO2: 95% (03-28-25 @ 05:02) (94% - 98%)    s1s2  b/l air entry  soft, ascites   edema                                                                                                                                                                    10.5   15.58 )-----------( 167      ( 28 Mar 2025 09:31 )             34.6     28 Mar 2025 09:31    132    |  92     |  77     ----------------------------<  160    4.2     |  25     |  5.92     Ca    8.8        28 Mar 2025 09:31  Phos  5.9       28 Mar 2025 09:31  Mg     2.6       28 Mar 2025 09:31    TPro  6.9    /  Alb  3.2    /  TBili  0.7    /  DBili  x      /  AST  33     /  ALT  6      /  AlkPhos  219    27 Mar 2025 02:44    LIVER FUNCTIONS - ( 27 Mar 2025 02:44 )  Alb: 3.2 g/dL / Pro: 6.9 g/dL / ALK PHOS: 219 U/L / ALT: 6 U/L / AST: 33 U/L / GGT: x           PT/INR - ( 27 Mar 2025 02:44 )   PT: 12.7 sec;   INR: 1.12 ratio      Culture - Blood (collected 26 Mar 2025 12:31)  Source: Blood Blood-Peripheral  Preliminary Report (27 Mar 2025 18:01):    No growth at 24 hours    Culture - Blood (collected 26 Mar 2025 12:25)  Source: Blood Blood-Peripheral  Preliminary Report (27 Mar 2025 18:01):    No growth at 24 hours    acetaminophen     Tablet .. 650 milliGRAM(s) Oral every 6 hours PRN  albuterol/ipratropium for Nebulization 3 milliLiter(s) Nebulizer every 6 hours PRN  allopurinol 100 milliGRAM(s) Oral daily  atorvastatin 20 milliGRAM(s) Oral at bedtime  bisacodyl 5 milliGRAM(s) Oral every 12 hours PRN  calcium acetate 667 milliGRAM(s) Oral three times a day with meals  chlorhexidine 2% Cloths 1 Application(s) Topical daily  chlorhexidine 4% Liquid 1 Application(s) Topical <User Schedule>  dextrose 5%. 1000 milliLiter(s) IV Continuous <Continuous>  dextrose 5%. 1000 milliLiter(s) IV Continuous <Continuous>  dextrose 50% Injectable 25 Gram(s) IV Push once  dextrose 50% Injectable 12.5 Gram(s) IV Push once  dextrose 50% Injectable 25 Gram(s) IV Push once  dextrose Oral Gel 15 Gram(s) Oral once PRN  dextrose Oral Gel 15 Gram(s) Oral once PRN  epoetin lashawn (EPOGEN) Injectable 92407 Unit(s) IV Push <User Schedule>  fluticasone propionate/ salmeterol 250-50 MICROgram(s) Diskus 1 Dose(s) Inhalation two times a day  folic acid 1 milliGRAM(s) Oral daily  glucagon  Injectable 1 milliGRAM(s) IntraMuscular once  hydrALAZINE 10 milliGRAM(s) Oral three times a day  insulin glargine Injectable (LANTUS) 18 Unit(s) SubCutaneous at bedtime  insulin lispro (ADMELOG) corrective regimen sliding scale   SubCutaneous three times a day before meals  insulin lispro Injectable (ADMELOG) 3 Unit(s) SubCutaneous three times a day before meals  isosorbide   dinitrate Tablet (ISORDIL) 10 milliGRAM(s) Oral three times a day  melatonin 3 milliGRAM(s) Oral at bedtime PRN  metoprolol succinate ER 50 milliGRAM(s) Oral daily  ondansetron Injectable 4 milliGRAM(s) IV Push every 6 hours PRN  pantoprazole    Tablet 40 milliGRAM(s) Oral every 12 hours  polyethylene glycol 3350 17 Gram(s) Oral two times a day  psyllium Powder 1 Packet(s) Oral daily  sodium chloride 0.65% Nasal 1 Spray(s) Both Nostrils two times a day  sodium chloride 0.9% lock flush 10 milliLiter(s) IV Push every 1 hour PRN  tamsulosin 0.4 milliGRAM(s) Oral at bedtime  tiotropium 2.5 MICROgram(s) Inhaler 2 Puff(s) Inhalation daily    A/P:    COPD, on home O2  CM, EF ~ 30%, severe TR  Adm to [facility] VS 1/10/25 w/fluid overload   Tx to [facility] 2/5/25 for HF eval    S/p LVP 3/14 w/4.5L fluid removal, s/p SPA x 4   Dropped Hgb to 5.3 on 3/15, s/p PRBCs   Hemodynamic/cardio-renal DENISHA/CKD   Poor UO on IV Bumex  Started on HD 3/24   Next HD tomorrow  TMP as tolerates   Perm cath w/IR once cleared by ID  Pt also will need arrangements for [facility] w/HD     [phone] MS at baseline today     Vital Signs Last 24 Hrs  T(C): 36.3 (03-28-25 @ 05:02), Max: 36.4 (03-27-25 @ 11:31)  T(F): 97.3 (03-28-25 @ 05:02), Max: 97.5 (03-27-25 @ 11:31)  HR: 82 (03-28-25 @ 05:02) (70 - 82)  BP: 92/46 (03-28-25 @ 05:02) (92/46 - 108/67)  BP(mean): 77 (03-27-25 @ 11:31) (77 - 77)  RR: 18 (03-28-25 @ 05:02) (18 - 18)  SpO2: 95% (03-28-25 @ 05:02) (94% - 98%)    s1s2  b/l air entry  soft, ascites   edema                                                                                                                                                                    10.5   15.58 )-----------( 167      ( 28 Mar 2025 09:31 )             34.6     28 Mar 2025 09:31    132    |  92     |  77     ----------------------------<  160    4.2     |  25     |  5.92     Ca    8.8        28 Mar 2025 09:31  Phos  5.9       28 Mar 2025 09:31  Mg     2.6       28 Mar 2025 09:31    TPro  6.9    /  Alb  3.2    /  TBili  0.7    /  DBili  x      /  AST  33     /  ALT  6      /  AlkPhos  219    27 Mar 2025 02:44    LIVER FUNCTIONS - ( 27 Mar 2025 02:44 )  Alb: 3.2 g/dL / Pro: 6.9 g/dL / ALK PHOS: 219 U/L / ALT: 6 U/L / AST: 33 U/L / GGT: x           PT/INR - ( 27 Mar 2025 02:44 )   PT: 12.7 sec;   INR: 1.12 ratio      Culture - Blood (collected 26 Mar 2025 12:31)  Source: Blood Blood-Peripheral  Preliminary Report (27 Mar 2025 18:01):    No growth at 24 hours    Culture - Blood (collected 26 Mar 2025 12:25)  Source: Blood Blood-Peripheral  Preliminary Report (27 Mar 2025 18:01):    No growth at 24 hours    acetaminophen     Tablet .. 650 milliGRAM(s) Oral every 6 hours PRN  albuterol/ipratropium for Nebulization 3 milliLiter(s) Nebulizer every 6 hours PRN  allopurinol 100 milliGRAM(s) Oral daily  atorvastatin 20 milliGRAM(s) Oral at bedtime  bisacodyl 5 milliGRAM(s) Oral every 12 hours PRN  calcium acetate 667 milliGRAM(s) Oral three times a day with meals  chlorhexidine 2% Cloths 1 Application(s) Topical daily  chlorhexidine 4% Liquid 1 Application(s) Topical <User Schedule>  dextrose 5%. 1000 milliLiter(s) IV Continuous <Continuous>  dextrose 5%. 1000 milliLiter(s) IV Continuous <Continuous>  dextrose 50% Injectable 25 Gram(s) IV Push once  dextrose 50% Injectable 12.5 Gram(s) IV Push once  dextrose 50% Injectable 25 Gram(s) IV Push once  dextrose Oral Gel 15 Gram(s) Oral once PRN  dextrose Oral Gel 15 Gram(s) Oral once PRN  epoetin lashawn (EPOGEN) Injectable 74404 Unit(s) IV Push <User Schedule>  fluticasone propionate/ salmeterol 250-50 MICROgram(s) Diskus 1 Dose(s) Inhalation two times a day  folic acid 1 milliGRAM(s) Oral daily  glucagon  Injectable 1 milliGRAM(s) IntraMuscular once  hydrALAZINE 10 milliGRAM(s) Oral three times a day  insulin glargine Injectable (LANTUS) 18 Unit(s) SubCutaneous at bedtime  insulin lispro (ADMELOG) corrective regimen sliding scale   SubCutaneous three times a day before meals  insulin lispro Injectable (ADMELOG) 3 Unit(s) SubCutaneous three times a day before meals  isosorbide   dinitrate Tablet (ISORDIL) 10 milliGRAM(s) Oral three times a day  melatonin 3 milliGRAM(s) Oral at bedtime PRN  metoprolol succinate ER 50 milliGRAM(s) Oral daily  ondansetron Injectable 4 milliGRAM(s) IV Push every 6 hours PRN  pantoprazole    Tablet 40 milliGRAM(s) Oral every 12 hours  polyethylene glycol 3350 17 Gram(s) Oral two times a day  psyllium Powder 1 Packet(s) Oral daily  sodium chloride 0.65% Nasal 1 Spray(s) Both Nostrils two times a day  sodium chloride 0.9% lock flush 10 milliLiter(s) IV Push every 1 hour PRN  tamsulosin 0.4 milliGRAM(s) Oral at bedtime  tiotropium 2.5 MICROgram(s) Inhaler 2 Puff(s) Inhalation daily    A/P:    COPD, on home O2  CM, EF ~ 30%, severe TR  Adm to [facility] VS 1/10/25 w/fluid overload   Tx to [facility] 2/5/25 for HF eval    S/p LVP 3/14 w/4.5L fluid removal, s/p SPA x 4   Dropped Hgb to 5.3 on 3/15, s/p PRBCs   Hemodynamic/cardio-renal DENISHA/CKD   Started on HD 3/24   4th HD today  TMP as tolerates   Perm cath w/IR once cleared by ID  Pt also will need arrangements for [facility] w/HD     [phone]

## 2025-03-28 NOTE — PROCEDURE NOTE - PROCEDURE FINDINGS AND DETAILS
Successful image-guided paracentesis performed with ultrasound-guidance in the right lower quadrant.   4600 mL of straw-colored fluid drained.  Patient tolerated the procedure well with no immediate complication.
Successful image-guided paracentesis performed with ultrasound-guidance in the right lower quadrant.   4600 mL of straw-colored fluid drained.  Patient tolerated the procedure well with no immediate complication.
Ultrasound and fluoroscopic-guided insertion of right internal jugular vein approach tunneled hemodialysis catheter.  23 tip to cuff catheter placed.  Catheter tip is in the SVC.  Patient tolerated the procedure well with no complications.  Catheter may be used immediately.

## 2025-03-28 NOTE — PROGRESS NOTE ADULT - ASSESSMENT
78 y/o male w/ PMHx CAD s/p CABG, HF (combined HFrEF [EF 25%] and HFpEF [G3DD]) w/ ICD, AFib on Xarelto, severe AS s/p TAVR, COPD on 3-4L home O2, CKD4, HTN, HLD, T2DM, and BPH who presents as a transfer from [facility] for HF evaluation. Ongoing CHF exacerbation now off bumex gtt, new DENISHA on CKD likely 2/2 ongoing cardiorenal and possible component of abdominal compartment syndrome given tremendous ascites, now plan for shiley/HD

## 2025-03-28 NOTE — PRE PROCEDURE NOTE - PRE PROCEDURE EVALUATION
Interventional Radiology    HPI: 79y Male with ESRD on HD presenting for conversion to tunneled HD catheter for long-term hemodialysis.     Allergies: No Known Allergies    Medications (Abx/Cardiac/Anticoagulation/Blood Products)    hydrALAZINE: 10 milliGRAM(s) Oral (03-27 @ 21:47)  isosorbide   dinitrate Tablet (ISORDIL): 10 milliGRAM(s) Oral (03-27 @ 21:48)  metoprolol succinate ER: 50 milliGRAM(s) Oral (03-27 @ 09:10)    Data:    T(C): 36.9  HR: 65  BP: 103/68  RR: 18  SpO2: 98%    Exam  General: No acute distress  Chest: Non labored breathing  Abdomen: Non-distended  Extremities: No swelling, warm    -WBC 15.58 / HgB 10.5 / Hct 34.6 / Plt 167  -Na 132 / Cl 92 / BUN 77 / Glucose 160  -K 4.2 / CO2 25 / Cr 5.92  -INR1.12    Plan:   79y Male with ESRD on HD presenting for conversion to tunneled HD catheter for long-term hemodialysis.   -- Relevant imaging and labs were reviewed.   -- Risks, benefits, and alternatives were explained to the patient and informed consent was obtained. Interventional Radiology    HPI: 79y Male with ESRD on HD presenting for conversion to tunneled HD catheter for long-term hemodialysis.     Allergies: No Known Allergies    Medications (Abx/Cardiac/Anticoagulation/Blood Products)    hydrALAZINE: 10 milliGRAM(s) Oral (03-27 @ 21:47)  isosorbide   dinitrate Tablet (ISORDIL): 10 milliGRAM(s) Oral (03-27 @ 21:48)  metoprolol succinate ER: 50 milliGRAM(s) Oral (03-27 @ 09:10)    Data:    T(C): 36.9  HR: 65  BP: 103/68  RR: 18  SpO2: 98%    Exam  General: No acute distress  Chest: Non labored breathing  Abdomen: Non-distended  Extremities: No swelling, warm    -WBC 15.58 / HgB 10.5 / Hct 34.6 / Plt 167  -Na 132 / Cl 92 / BUN 77 / Glucose 160  -K 4.2 / CO2 25 / Cr 5.92  -INR1.12    Plan:   79y Male with ESRD on HD presenting for conversion to tunneled HD catheter for long-term hemodialysis.   -- Relevant imaging and labs were reviewed.   -- Risks, benefits, and alternatives were explained to the patient and informed consent was obtained.    Leukocytosis d/w ID team. Low suspicion for bloodstream infection. WBC's downtrending. BCx neg x24h. OK to proceed.

## 2025-03-28 NOTE — PRE-ANESTHESIA EVALUATION ADULT - NSANTHADDINFOFT_GEN_ALL_CORE
Family confirmed to rescind DNI for procedure. As requested the patient does not desire chest compression in situation of cardiac arrest, even if it occurs intraprocedural.

## 2025-03-28 NOTE — PROCEDURE NOTE - NSPROCNAME_GEN_A_CORE
Interventional Radiology
CRT-D (Cardiac Resynchronization Therapy with Defibrillation Capabilities) Interrogation Note
Central Line Insertion

## 2025-03-28 NOTE — PRE PROCEDURE NOTE - PROCEDURE SERVICE
Vascular and Interventional Radiology
Vascular and Interventional Radiology
Interventional Radiology

## 2025-03-28 NOTE — PROGRESS NOTE ADULT - SUBJECTIVE AND OBJECTIVE BOX
Follow Up:  leukocytosis    Interval History/ROS:  Overnight: No acute events.  Patient remains afebrile.  Otherwise hemodynamically stable.  Latest labs show leukocytosis of 15.5, anemia 10.5/34.6.  BMP with elevated creatinine to 5.9.  Blood cultures from 3/26/2025 are negative to date.  CT head on 3/27/2025 with no acute intracranial hemorrhage.    Patient seen examined at bedside.  No new complaints.    Allergies  No Known Allergies        ANTIMICROBIALS:      OTHER MEDS:  MEDICATIONS  (STANDING):  acetaminophen     Tablet .. 650 every 6 hours PRN  albuterol/ipratropium for Nebulization 3 every 6 hours PRN  allopurinol 100 daily  atorvastatin 20 at bedtime  bisacodyl 5 every 12 hours PRN  dextrose 50% Injectable 25 once  dextrose 50% Injectable 12.5 once  dextrose 50% Injectable 25 once  dextrose Oral Gel 15 once PRN  dextrose Oral Gel 15 once PRN  epoetin lashawn (EPOGEN) Injectable 53596 <User Schedule>  fluticasone propionate/ salmeterol 250-50 MICROgram(s) Diskus 1 two times a day  glucagon  Injectable 1 once  hydrALAZINE 10 three times a day  insulin glargine Injectable (LANTUS) 18 at bedtime  insulin lispro (ADMELOG) corrective regimen sliding scale  three times a day before meals  insulin lispro Injectable (ADMELOG) 3 three times a day before meals  isosorbide   dinitrate Tablet (ISORDIL) 10 three times a day  melatonin 3 at bedtime PRN  metoprolol succinate ER 50 daily  ondansetron Injectable 4 every 6 hours PRN  pantoprazole    Tablet 40 every 12 hours  polyethylene glycol 3350 17 two times a day  psyllium Powder 1 daily  tamsulosin 0.4 at bedtime  tiotropium 2.5 MICROgram(s) Inhaler 2 daily      Vital Signs Last 24 Hrs  T(C): 36.3 (28 Mar 2025 05:02), Max: 36.4 (27 Mar 2025 11:31)  T(F): 97.3 (28 Mar 2025 05:02), Max: 97.5 (27 Mar 2025 11:31)  HR: 82 (28 Mar 2025 05:02) (70 - 82)  BP: 92/46 (28 Mar 2025 05:02) (92/46 - 108/67)  BP(mean): 77 (27 Mar 2025 11:31) (77 - 77)  RR: 18 (28 Mar 2025 05:02) (18 - 18)  SpO2: 95% (28 Mar 2025 05:02) (94% - 98%)    Parameters below as of 28 Mar 2025 05:02  Patient On (Oxygen Delivery Method): nasal cannula        PHYSICAL EXAM:  Constitutional: non-toxic, no distress  HEAD/EYES: anicteric, no conjunctival injection  ENT:  supple, no thrush  Cardiovascular:   normal S1, S2, no murmur, no edema  Respiratory:  clear BS bilaterally, no wheezes, no rales  GI: distended, tense - ascites present  :  ro+, no CVA tenderness  Musculoskeletal:  no synovitis, normal ROM  Neurologic: awake and alert, normal strength, no focal findings  Skin:  ICD site without evidence for infection, no phlebitis  Heme/Onc: no lymphadenopathy   Psychiatric:  awake, alert, appropriate mood                                10.5   15.58 )-----------( 167      ( 28 Mar 2025 09:31 )             34.6       03-28    132[L]  |  92[L]  |  77[H]  ----------------------------<  160[H]  4.2   |  25  |  5.92[H]    Ca    8.8      28 Mar 2025 09:31  Phos  5.9     03-28  Mg     2.6     03-28    TPro  6.9  /  Alb  3.2[L]  /  TBili  0.7  /  DBili  x   /  AST  33  /  ALT  6[L]  /  AlkPhos  219[H]  03-27      Urinalysis Basic - ( 28 Mar 2025 09:31 )    Color: x / Appearance: x / SG: x / pH: x  Gluc: 160 mg/dL / Ketone: x  / Bili: x / Urobili: x   Blood: x / Protein: x / Nitrite: x   Leuk Esterase: x / RBC: x / WBC x   Sq Epi: x / Non Sq Epi: x / Bacteria: x        MICROBIOLOGY:  v    Culture - Blood (collected 26 Mar 2025 12:31)  Source: Blood Blood-Peripheral  Preliminary Report (27 Mar 2025 18:01):    No growth at 24 hours    Culture - Blood (collected 26 Mar 2025 12:25)  Source: Blood Blood-Peripheral  Preliminary Report (27 Mar 2025 18:01):    No growth at 24 hours                    RADIOLOGY:  Imaging reviewed

## 2025-03-28 NOTE — PROGRESS NOTE ADULT - SUBJECTIVE AND OBJECTIVE BOX
Patient is a 79y old  Male who presents with a chief complaint of HF eval (28 Mar 2025 11:17)      SUBJECTIVE / OVERNIGHT EVENTS: wife at bedside, reports pt waxes and wanes, no cp, sob, chills     MEDICATIONS  (STANDING):  allopurinol 100 milliGRAM(s) Oral daily  atorvastatin 20 milliGRAM(s) Oral at bedtime  calcium acetate 667 milliGRAM(s) Oral three times a day with meals  chlorhexidine 2% Cloths 1 Application(s) Topical daily  chlorhexidine 4% Liquid 1 Application(s) Topical <User Schedule>  dextrose 5%. 1000 milliLiter(s) (100 mL/Hr) IV Continuous <Continuous>  dextrose 5%. 1000 milliLiter(s) (50 mL/Hr) IV Continuous <Continuous>  dextrose 50% Injectable 25 Gram(s) IV Push once  dextrose 50% Injectable 12.5 Gram(s) IV Push once  dextrose 50% Injectable 25 Gram(s) IV Push once  epoetin lashawn (EPOGEN) Injectable 99183 Unit(s) IV Push <User Schedule>  fluticasone propionate/ salmeterol 250-50 MICROgram(s) Diskus 1 Dose(s) Inhalation two times a day  folic acid 1 milliGRAM(s) Oral daily  glucagon  Injectable 1 milliGRAM(s) IntraMuscular once  insulin glargine Injectable (LANTUS) 18 Unit(s) SubCutaneous at bedtime  insulin lispro (ADMELOG) corrective regimen sliding scale   SubCutaneous three times a day before meals  insulin lispro Injectable (ADMELOG) 3 Unit(s) SubCutaneous three times a day before meals  pantoprazole    Tablet 40 milliGRAM(s) Oral every 12 hours  polyethylene glycol 3350 17 Gram(s) Oral two times a day  psyllium Powder 1 Packet(s) Oral daily  sodium chloride 0.65% Nasal 1 Spray(s) Both Nostrils two times a day  tamsulosin 0.4 milliGRAM(s) Oral at bedtime  tiotropium 2.5 MICROgram(s) Inhaler 2 Puff(s) Inhalation daily    MEDICATIONS  (PRN):  acetaminophen     Tablet .. 650 milliGRAM(s) Oral every 6 hours PRN Temp greater or equal to 38C (100.4F), Mild Pain (1 - 3)  albuterol/ipratropium for Nebulization 3 milliLiter(s) Nebulizer every 6 hours PRN Shortness of Breath and/or Wheezing  bisacodyl 5 milliGRAM(s) Oral every 12 hours PRN Constipation  dextrose Oral Gel 15 Gram(s) Oral once PRN Blood Glucose LESS THAN 70 milliGRAM(s)/deciliter  dextrose Oral Gel 15 Gram(s) Oral once PRN Blood Glucose LESS THAN 70 milliGRAM(s)/deciliter  melatonin 3 milliGRAM(s) Oral at bedtime PRN Insomnia  ondansetron Injectable 4 milliGRAM(s) IV Push every 6 hours PRN Nausea and/or Vomiting  sodium chloride 0.9% lock flush 10 milliLiter(s) IV Push every 1 hour PRN Pre/post blood products, medications, blood draw, and to maintain line patency        CAPILLARY BLOOD GLUCOSE      POCT Blood Glucose.: 155 mg/dL (28 Mar 2025 12:36)  POCT Blood Glucose.: 168 mg/dL (28 Mar 2025 08:37)  POCT Blood Glucose.: 173 mg/dL (28 Mar 2025 06:27)  POCT Blood Glucose.: 177 mg/dL (27 Mar 2025 21:32)  POCT Blood Glucose.: 187 mg/dL (27 Mar 2025 17:11)  POCT Blood Glucose.: 201 mg/dL (27 Mar 2025 15:00)    I&O's Summary    27 Mar 2025 07:01  -  28 Mar 2025 07:00  --------------------------------------------------------  IN: 100 mL / OUT: 0 mL / NET: 100 mL        PHYSICAL EXAM:  CONSTITUTIONAL: NAD  EYES: PERRLA; conjunctiva and sclera clear  NECK: Supple  RESPIRATORY: Normal respiratory effort; CTAB  CARDIOVASCULAR: RRR, S1S2  ABDOMEN: distended  MUSCLOSKELETAL:  anasarca  PSYCH: A+O x 2-3, affect normal  NEUROLOGY: CN 2-12 are intact       LABS:                        10.5   15.58 )-----------( 167      ( 28 Mar 2025 09:31 )             34.6     03-28    132[L]  |  92[L]  |  77[H]  ----------------------------<  160[H]  4.2   |  25  |  5.92[H]    Ca    8.8      28 Mar 2025 09:31  Phos  5.9     03-28  Mg     2.6     03-28    TPro  6.9  /  Alb  3.2[L]  /  TBili  0.7  /  DBili  x   /  AST  33  /  ALT  6[L]  /  AlkPhos  219[H]  03-27    PT/INR - ( 27 Mar 2025 02:44 )   PT: 12.7 sec;   INR: 1.12 ratio         PTT - ( 27 Mar 2025 02:44 )  PTT:30.7 sec      Urinalysis Basic - ( 28 Mar 2025 09:31 )    Color: x / Appearance: x / SG: x / pH: x  Gluc: 160 mg/dL / Ketone: x  / Bili: x / Urobili: x   Blood: x / Protein: x / Nitrite: x   Leuk Esterase: x / RBC: x / WBC x   Sq Epi: x / Non Sq Epi: x / Bacteria: x        RADIOLOGY & ADDITIONAL TESTS:    Imaging Personally Reviewed:    Consultant(s) Notes Reviewed:      Care Discussed with Consultants/Other Providers:

## 2025-03-29 LAB
ANION GAP SERPL CALC-SCNC: 16 MMOL/L — SIGNIFICANT CHANGE UP (ref 5–17)
ANISOCYTOSIS BLD QL: SLIGHT — SIGNIFICANT CHANGE UP
BASOPHILS # BLD AUTO: 0 K/UL — SIGNIFICANT CHANGE UP (ref 0–0.2)
BASOPHILS NFR BLD AUTO: 0 % — SIGNIFICANT CHANGE UP (ref 0–2)
BUN SERPL-MCNC: 59 MG/DL — HIGH (ref 7–23)
CALCIUM SERPL-MCNC: 8.8 MG/DL — SIGNIFICANT CHANGE UP (ref 8.4–10.5)
CHLORIDE SERPL-SCNC: 92 MMOL/L — LOW (ref 96–108)
CO2 SERPL-SCNC: 26 MMOL/L — SIGNIFICANT CHANGE UP (ref 22–31)
CREAT SERPL-MCNC: 5.08 MG/DL — HIGH (ref 0.5–1.3)
EGFR: 11 ML/MIN/1.73M2 — LOW
EGFR: 11 ML/MIN/1.73M2 — LOW
ELLIPTOCYTES BLD QL SMEAR: SLIGHT — SIGNIFICANT CHANGE UP
EOSINOPHIL # BLD AUTO: 0.27 K/UL — SIGNIFICANT CHANGE UP (ref 0–0.5)
EOSINOPHIL NFR BLD AUTO: 1.8 % — SIGNIFICANT CHANGE UP (ref 0–6)
GLUCOSE BLDC GLUCOMTR-MCNC: 136 MG/DL — HIGH (ref 70–99)
GLUCOSE BLDC GLUCOMTR-MCNC: 144 MG/DL — HIGH (ref 70–99)
GLUCOSE BLDC GLUCOMTR-MCNC: 147 MG/DL — HIGH (ref 70–99)
GLUCOSE BLDC GLUCOMTR-MCNC: 171 MG/DL — HIGH (ref 70–99)
GLUCOSE SERPL-MCNC: 145 MG/DL — HIGH (ref 70–99)
HCT VFR BLD CALC: 34.6 % — LOW (ref 39–50)
HGB BLD-MCNC: 10.3 G/DL — LOW (ref 13–17)
HYPOCHROMIA BLD QL: SLIGHT — SIGNIFICANT CHANGE UP
LYMPHOCYTES # BLD AUTO: 0.95 K/UL — LOW (ref 1–3.3)
LYMPHOCYTES # BLD AUTO: 6.3 % — LOW (ref 13–44)
MACROCYTES BLD QL: SLIGHT — SIGNIFICANT CHANGE UP
MANUAL SMEAR VERIFICATION: SIGNIFICANT CHANGE UP
MCHC RBC-ENTMCNC: 26.5 PG — LOW (ref 27–34)
MCHC RBC-ENTMCNC: 29.8 G/DL — LOW (ref 32–36)
MCV RBC AUTO: 88.9 FL — SIGNIFICANT CHANGE UP (ref 80–100)
MICROCYTES BLD QL: SLIGHT — SIGNIFICANT CHANGE UP
MONOCYTES # BLD AUTO: 1.77 K/UL — HIGH (ref 0–0.9)
MONOCYTES NFR BLD AUTO: 11.7 % — SIGNIFICANT CHANGE UP (ref 2–14)
NEUTROPHILS # BLD AUTO: 12.1 K/UL — HIGH (ref 1.8–7.4)
NEUTROPHILS NFR BLD AUTO: 80.2 % — HIGH (ref 43–77)
NRBC # BLD: 3 /100 WBCS — HIGH (ref 0–0)
NRBC BLD-RTO: 3 /100 WBCS — HIGH (ref 0–0)
PLAT MORPH BLD: NORMAL — SIGNIFICANT CHANGE UP
PLATELET # BLD AUTO: 171 K/UL — SIGNIFICANT CHANGE UP (ref 150–400)
POIKILOCYTOSIS BLD QL AUTO: SLIGHT — SIGNIFICANT CHANGE UP
POLYCHROMASIA BLD QL SMEAR: SIGNIFICANT CHANGE UP
POTASSIUM SERPL-MCNC: 3.7 MMOL/L — SIGNIFICANT CHANGE UP (ref 3.5–5.3)
POTASSIUM SERPL-SCNC: 3.7 MMOL/L — SIGNIFICANT CHANGE UP (ref 3.5–5.3)
RBC # BLD: 3.89 M/UL — LOW (ref 4.2–5.8)
RBC # FLD: 17.5 % — HIGH (ref 10.3–14.5)
RBC BLD AUTO: ABNORMAL
SODIUM SERPL-SCNC: 134 MMOL/L — LOW (ref 135–145)
WBC # BLD: 15.09 K/UL — HIGH (ref 3.8–10.5)
WBC # FLD AUTO: 15.09 K/UL — HIGH (ref 3.8–10.5)

## 2025-03-29 PROCEDURE — 99232 SBSQ HOSP IP/OBS MODERATE 35: CPT

## 2025-03-29 RX ORDER — ASPIRIN 325 MG
81 TABLET ORAL DAILY
Refills: 0 | Status: DISCONTINUED | OUTPATIENT
Start: 2025-03-29 | End: 2025-04-14

## 2025-03-29 RX ORDER — APIXABAN 2.5 MG/1
2.5 TABLET, FILM COATED ORAL
Refills: 0 | Status: DISCONTINUED | OUTPATIENT
Start: 2025-03-29 | End: 2025-04-14

## 2025-03-29 RX ORDER — RIVAROXABAN 10 MG/1
15 TABLET, FILM COATED ORAL
Refills: 0 | Status: DISCONTINUED | OUTPATIENT
Start: 2025-03-29 | End: 2025-03-29

## 2025-03-29 RX ADMIN — Medication 650 MILLIGRAM(S): at 08:45

## 2025-03-29 RX ADMIN — POLYETHYLENE GLYCOL 3350 17 GRAM(S): 17 POWDER, FOR SOLUTION ORAL at 05:48

## 2025-03-29 RX ADMIN — POLYETHYLENE GLYCOL 3350 17 GRAM(S): 17 POWDER, FOR SOLUTION ORAL at 18:11

## 2025-03-29 RX ADMIN — Medication 1 SPRAY(S): at 05:47

## 2025-03-29 RX ADMIN — Medication 40 MILLIGRAM(S): at 18:11

## 2025-03-29 RX ADMIN — Medication 667 MILLIGRAM(S): at 12:06

## 2025-03-29 RX ADMIN — INSULIN GLARGINE-YFGN 18 UNIT(S): 100 INJECTION, SOLUTION SUBCUTANEOUS at 22:05

## 2025-03-29 RX ADMIN — Medication 1 APPLICATION(S): at 12:06

## 2025-03-29 RX ADMIN — TIOTROPIUM BROMIDE INHALATION SPRAY 2 PUFF(S): 3.12 SPRAY, METERED RESPIRATORY (INHALATION) at 18:10

## 2025-03-29 RX ADMIN — Medication 1 DOSE(S): at 18:10

## 2025-03-29 RX ADMIN — INSULIN LISPRO 3 UNIT(S): 100 INJECTION, SOLUTION INTRAVENOUS; SUBCUTANEOUS at 13:02

## 2025-03-29 RX ADMIN — Medication 1 SPRAY(S): at 18:11

## 2025-03-29 RX ADMIN — APIXABAN 2.5 MILLIGRAM(S): 2.5 TABLET, FILM COATED ORAL at 22:04

## 2025-03-29 RX ADMIN — INSULIN LISPRO 3 UNIT(S): 100 INJECTION, SOLUTION INTRAVENOUS; SUBCUTANEOUS at 18:09

## 2025-03-29 RX ADMIN — Medication 667 MILLIGRAM(S): at 07:57

## 2025-03-29 RX ADMIN — Medication 40 MILLIGRAM(S): at 05:47

## 2025-03-29 RX ADMIN — TAMSULOSIN HYDROCHLORIDE 0.4 MILLIGRAM(S): 0.4 CAPSULE ORAL at 22:05

## 2025-03-29 RX ADMIN — Medication 100 MILLIGRAM(S): at 12:06

## 2025-03-29 RX ADMIN — Medication 650 MILLIGRAM(S): at 07:57

## 2025-03-29 RX ADMIN — FOLIC ACID 1 MILLIGRAM(S): 1 TABLET ORAL at 12:05

## 2025-03-29 RX ADMIN — INSULIN LISPRO 2: 100 INJECTION, SOLUTION INTRAVENOUS; SUBCUTANEOUS at 08:42

## 2025-03-29 RX ADMIN — Medication 1 APPLICATION(S): at 06:05

## 2025-03-29 RX ADMIN — Medication 1 DOSE(S): at 05:47

## 2025-03-29 RX ADMIN — IPRATROPIUM BROMIDE AND ALBUTEROL SULFATE 3 MILLILITER(S): .5; 2.5 SOLUTION RESPIRATORY (INHALATION) at 01:28

## 2025-03-29 RX ADMIN — ATORVASTATIN CALCIUM 20 MILLIGRAM(S): 80 TABLET, FILM COATED ORAL at 22:04

## 2025-03-29 RX ADMIN — Medication 1 PACKET(S): at 22:05

## 2025-03-29 RX ADMIN — Medication 667 MILLIGRAM(S): at 18:11

## 2025-03-29 RX ADMIN — INSULIN LISPRO 3 UNIT(S): 100 INJECTION, SOLUTION INTRAVENOUS; SUBCUTANEOUS at 08:43

## 2025-03-29 NOTE — PROGRESS NOTE ADULT - ASSESSMENT
80 y/o male w/ PMHx CAD s/p CABG, HF (combined HFrEF [EF 25%] and HFpEF [G3DD]) w/ ICD, AFib on Xarelto, severe AS s/p TAVR, COPD on 3-4L home O2, CKD4, HTN, HLD, T2DM, and BPH who presents as a transfer from [facility] for HF evaluation. Ongoing CHF exacerbation now off bumex gtt, new DENISHA on CKD likely 2/2 ongoing cardiorenal and possible component of abdominal compartment syndrome given tremendous ascites, now s/p tunnelled HD catheter

## 2025-03-29 NOTE — PROGRESS NOTE ADULT - SUBJECTIVE AND OBJECTIVE BOX
PROGRESS NOTE:     Patient is a 79y old  Male who presents with a chief complaint of HF eval (28 Mar 2025 12:51)      SUBJECTIVE / OVERNIGHT EVENTS:No acute overnight events. patient seen and examined bedside in now acute distress.         MEDICATIONS  (STANDING):  allopurinol 100 milliGRAM(s) Oral daily  atorvastatin 20 milliGRAM(s) Oral at bedtime  calcium acetate 667 milliGRAM(s) Oral three times a day with meals  chlorhexidine 2% Cloths 1 Application(s) Topical daily  chlorhexidine 4% Liquid 1 Application(s) Topical <User Schedule>  dextrose 5%. 1000 milliLiter(s) (50 mL/Hr) IV Continuous <Continuous>  dextrose 5%. 1000 milliLiter(s) (100 mL/Hr) IV Continuous <Continuous>  dextrose 50% Injectable 25 Gram(s) IV Push once  dextrose 50% Injectable 12.5 Gram(s) IV Push once  dextrose 50% Injectable 25 Gram(s) IV Push once  epoetin lashawn (EPOGEN) Injectable 02639 Unit(s) IV Push <User Schedule>  fluticasone propionate/ salmeterol 250-50 MICROgram(s) Diskus 1 Dose(s) Inhalation two times a day  folic acid 1 milliGRAM(s) Oral daily  glucagon  Injectable 1 milliGRAM(s) IntraMuscular once  insulin glargine Injectable (LANTUS) 18 Unit(s) SubCutaneous at bedtime  insulin lispro (ADMELOG) corrective regimen sliding scale   SubCutaneous three times a day before meals  insulin lispro Injectable (ADMELOG) 3 Unit(s) SubCutaneous three times a day before meals  metoprolol succinate ER 25 milliGRAM(s) Oral daily  pantoprazole    Tablet 40 milliGRAM(s) Oral every 12 hours  polyethylene glycol 3350 17 Gram(s) Oral two times a day  psyllium Powder 1 Packet(s) Oral daily  sodium chloride 0.65% Nasal 1 Spray(s) Both Nostrils two times a day  tamsulosin 0.4 milliGRAM(s) Oral at bedtime  tiotropium 2.5 MICROgram(s) Inhaler 2 Puff(s) Inhalation daily    MEDICATIONS  (PRN):  acetaminophen     Tablet .. 650 milliGRAM(s) Oral every 6 hours PRN Temp greater or equal to 38C (100.4F), Mild Pain (1 - 3)  albuterol/ipratropium for Nebulization 3 milliLiter(s) Nebulizer every 6 hours PRN Shortness of Breath and/or Wheezing  bisacodyl 5 milliGRAM(s) Oral every 12 hours PRN Constipation  dextrose Oral Gel 15 Gram(s) Oral once PRN Blood Glucose LESS THAN 70 milliGRAM(s)/deciliter  dextrose Oral Gel 15 Gram(s) Oral once PRN Blood Glucose LESS THAN 70 milliGRAM(s)/deciliter  fentaNYL    Injectable 25 MICROGram(s) IV Push every 5 minutes PRN Moderate Pain (4 - 6)  melatonin 3 milliGRAM(s) Oral at bedtime PRN Insomnia  ondansetron Injectable 4 milliGRAM(s) IV Push once PRN Nausea and/or Vomiting  ondansetron Injectable 4 milliGRAM(s) IV Push every 6 hours PRN Nausea and/or Vomiting  oxyCODONE    IR 5 milliGRAM(s) Oral once PRN Moderate Pain (4 - 6)  sodium chloride 0.9% lock flush 10 milliLiter(s) IV Push every 1 hour PRN Pre/post blood products, medications, blood draw, and to maintain line patency      CAPILLARY BLOOD GLUCOSE      POCT Blood Glucose.: 147 mg/dL (29 Mar 2025 12:56)  POCT Blood Glucose.: 171 mg/dL (29 Mar 2025 08:32)  POCT Blood Glucose.: 114 mg/dL (28 Mar 2025 21:18)  POCT Blood Glucose.: 141 mg/dL (28 Mar 2025 17:16)    I&O's Summary    28 Mar 2025 07:01  -  29 Mar 2025 07:00  --------------------------------------------------------  IN: 0 mL / OUT: 1500 mL / NET: -1500 mL        PHYSICAL EXAM:  Vital Signs Last 24 Hrs  T(C): 36.5 (29 Mar 2025 11:54), Max: 36.6 (29 Mar 2025 04:30)  T(F): 97.7 (29 Mar 2025 11:54), Max: 97.9 (29 Mar 2025 04:30)  HR: 69 (29 Mar 2025 11:54) (68 - 71)  BP: 103/63 (29 Mar 2025 11:54) (94/50 - 114/62)  BP(mean): 83 (28 Mar 2025 16:15) (69 - 83)  RR: 18 (29 Mar 2025 11:54) (12 - 27)  SpO2: 98% (29 Mar 2025 11:54) (93% - 98%)    Parameters below as of 29 Mar 2025 11:54  Patient On (Oxygen Delivery Method): nasal cannula  O2 Flow (L/min): 4      PHYSICAL EXAM:  CONSTITUTIONAL: NAD  EYES: PERRLA; conjunctiva and sclera clear  NECK: Supple  RESPIRATORY: Normal respiratory effort; CTAB  CARDIOVASCULAR: RRR, S1S2  ABDOMEN: distended  MUSCLOSKELETAL:  anasarca  PSYCH: A+O x 2-3, affect normal  NEUROLOGY: CN 2-12 are intact         LABS:                        10.3   15.09 )-----------( 171      ( 29 Mar 2025 11:27 )             34.6     03-29    134[L]  |  92[L]  |  59[H]  ----------------------------<  145[H]  3.7   |  26  |  5.08[H]    Ca    8.8      29 Mar 2025 11:27  Phos  5.9     03-28  Mg     2.6     03-28            Urinalysis Basic - ( 29 Mar 2025 11:27 )    Color: x / Appearance: x / SG: x / pH: x  Gluc: 145 mg/dL / Ketone: x  / Bili: x / Urobili: x   Blood: x / Protein: x / Nitrite: x   Leuk Esterase: x / RBC: x / WBC x   Sq Epi: x / Non Sq Epi: x / Bacteria: x          RADIOLOGY & ADDITIONAL TESTS:  Results Reviewed:   Imaging Personally Reviewed:  Electrocardiogram Personally Reviewed:    COORDINATION OF CARE:  Care Discussed with Consultants/Other Providers [Y/N]:  Prior or Outpatient Records Reviewed [Y/N]:

## 2025-03-29 NOTE — PROGRESS NOTE ADULT - PROBLEM SELECTOR PLAN 1
- Likely DENISHA in setting of cardiorenal syndrome  - Continue monitoring Cr and urine output.   - now on HD  - s/p tunneled HD catheter on 3/28  - renal follow up

## 2025-03-29 NOTE — CHART NOTE - NSCHARTNOTEFT_GEN_A_CORE
Xarelto discontinued due to contraindicated in HD pts,   __ made Dr Tse awake, awaiting response,   _- consider starting heparin gtt

## 2025-03-30 DIAGNOSIS — R53.83 OTHER FATIGUE: ICD-10-CM

## 2025-03-30 LAB
ADD ON TEST-SPECIMEN IN LAB: SIGNIFICANT CHANGE UP
ALBUMIN SERPL ELPH-MCNC: 3.4 G/DL — SIGNIFICANT CHANGE UP (ref 3.3–5)
ALP SERPL-CCNC: 237 U/L — HIGH (ref 40–120)
ALT FLD-CCNC: <5 U/L — LOW (ref 10–45)
AMMONIA BLD-MCNC: 40 UMOL/L — SIGNIFICANT CHANGE UP (ref 11–55)
ANION GAP SERPL CALC-SCNC: 15 MMOL/L — SIGNIFICANT CHANGE UP (ref 5–17)
ANION GAP SERPL CALC-SCNC: 16 MMOL/L — SIGNIFICANT CHANGE UP (ref 5–17)
APTT BLD: 30.4 SEC — SIGNIFICANT CHANGE UP (ref 24.5–35.6)
AST SERPL-CCNC: 37 U/L — SIGNIFICANT CHANGE UP (ref 10–40)
BASE EXCESS BLDA CALC-SCNC: 2.8 MMOL/L — SIGNIFICANT CHANGE UP (ref -2–3)
BASOPHILS # BLD AUTO: 0.02 K/UL — SIGNIFICANT CHANGE UP (ref 0–0.2)
BASOPHILS NFR BLD AUTO: 0.1 % — SIGNIFICANT CHANGE UP (ref 0–2)
BILIRUB SERPL-MCNC: 0.7 MG/DL — SIGNIFICANT CHANGE UP (ref 0.2–1.2)
BLD GP AB SCN SERPL QL: NEGATIVE — SIGNIFICANT CHANGE UP
BUN SERPL-MCNC: 63 MG/DL — HIGH (ref 7–23)
BUN SERPL-MCNC: 73 MG/DL — HIGH (ref 7–23)
CALCIUM SERPL-MCNC: 8.9 MG/DL — SIGNIFICANT CHANGE UP (ref 8.4–10.5)
CALCIUM SERPL-MCNC: 8.9 MG/DL — SIGNIFICANT CHANGE UP (ref 8.4–10.5)
CHLORIDE SERPL-SCNC: 93 MMOL/L — LOW (ref 96–108)
CHLORIDE SERPL-SCNC: 94 MMOL/L — LOW (ref 96–108)
CO2 BLDA-SCNC: 32 MMOL/L — HIGH (ref 19–24)
CO2 SERPL-SCNC: 24 MMOL/L — SIGNIFICANT CHANGE UP (ref 22–31)
CO2 SERPL-SCNC: 27 MMOL/L — SIGNIFICANT CHANGE UP (ref 22–31)
CREAT SERPL-MCNC: 5.55 MG/DL — HIGH (ref 0.5–1.3)
CREAT SERPL-MCNC: 5.83 MG/DL — HIGH (ref 0.5–1.3)
EGFR: 10 ML/MIN/1.73M2 — LOW
EGFR: 10 ML/MIN/1.73M2 — LOW
EGFR: 9 ML/MIN/1.73M2 — LOW
EGFR: 9 ML/MIN/1.73M2 — LOW
EOSINOPHIL # BLD AUTO: 0.02 K/UL — SIGNIFICANT CHANGE UP (ref 0–0.5)
EOSINOPHIL NFR BLD AUTO: 0.1 % — SIGNIFICANT CHANGE UP (ref 0–6)
FOLATE SERPL-MCNC: >20 NG/ML — SIGNIFICANT CHANGE UP
GAS PNL BLDV: SIGNIFICANT CHANGE UP
GLUCOSE BLDC GLUCOMTR-MCNC: 112 MG/DL — HIGH (ref 70–99)
GLUCOSE BLDC GLUCOMTR-MCNC: 113 MG/DL — HIGH (ref 70–99)
GLUCOSE BLDC GLUCOMTR-MCNC: 117 MG/DL — HIGH (ref 70–99)
GLUCOSE BLDC GLUCOMTR-MCNC: 126 MG/DL — HIGH (ref 70–99)
GLUCOSE BLDC GLUCOMTR-MCNC: 127 MG/DL — HIGH (ref 70–99)
GLUCOSE SERPL-MCNC: 106 MG/DL — HIGH (ref 70–99)
GLUCOSE SERPL-MCNC: 126 MG/DL — HIGH (ref 70–99)
HCO3 BLDA-SCNC: 30 MMOL/L — HIGH (ref 21–28)
HCT VFR BLD CALC: 37.7 % — LOW (ref 39–50)
HGB BLD-MCNC: 11.3 G/DL — LOW (ref 13–17)
HOROWITZ INDEX BLDA+IHG-RTO: 40 — SIGNIFICANT CHANGE UP
IMM GRANULOCYTES NFR BLD AUTO: 1.1 % — HIGH (ref 0–0.9)
INR BLD: 1.13 RATIO — SIGNIFICANT CHANGE UP (ref 0.85–1.16)
LYMPHOCYTES # BLD AUTO: 1.15 K/UL — SIGNIFICANT CHANGE UP (ref 1–3.3)
LYMPHOCYTES # BLD AUTO: 6.8 % — LOW (ref 13–44)
MAGNESIUM SERPL-MCNC: 2.6 MG/DL — SIGNIFICANT CHANGE UP (ref 1.6–2.6)
MCHC RBC-ENTMCNC: 27 PG — SIGNIFICANT CHANGE UP (ref 27–34)
MCHC RBC-ENTMCNC: 30 G/DL — LOW (ref 32–36)
MCV RBC AUTO: 90 FL — SIGNIFICANT CHANGE UP (ref 80–100)
MONOCYTES # BLD AUTO: 2.01 K/UL — HIGH (ref 0–0.9)
MONOCYTES NFR BLD AUTO: 11.9 % — SIGNIFICANT CHANGE UP (ref 2–14)
NEUTROPHILS # BLD AUTO: 13.56 K/UL — HIGH (ref 1.8–7.4)
NEUTROPHILS NFR BLD AUTO: 80 % — HIGH (ref 43–77)
NRBC BLD AUTO-RTO: 0 /100 WBCS — SIGNIFICANT CHANGE UP (ref 0–0)
PCO2 BLDA: 59 MMHG — HIGH (ref 35–48)
PH BLDA: 7.32 — LOW (ref 7.35–7.45)
PHOSPHATE SERPL-MCNC: 5.5 MG/DL — HIGH (ref 2.5–4.5)
PLATELET # BLD AUTO: 156 K/UL — SIGNIFICANT CHANGE UP (ref 150–400)
PO2 BLDA: 119 MMHG — HIGH (ref 83–108)
POTASSIUM SERPL-MCNC: 4.3 MMOL/L — SIGNIFICANT CHANGE UP (ref 3.5–5.3)
POTASSIUM SERPL-MCNC: 4.3 MMOL/L — SIGNIFICANT CHANGE UP (ref 3.5–5.3)
POTASSIUM SERPL-SCNC: 4.3 MMOL/L — SIGNIFICANT CHANGE UP (ref 3.5–5.3)
POTASSIUM SERPL-SCNC: 4.3 MMOL/L — SIGNIFICANT CHANGE UP (ref 3.5–5.3)
PROCALCITONIN SERPL-MCNC: 0.46 NG/ML — HIGH (ref 0.02–0.1)
PROT SERPL-MCNC: 7.4 G/DL — SIGNIFICANT CHANGE UP (ref 6–8.3)
PROTHROM AB SERPL-ACNC: 13 SEC — SIGNIFICANT CHANGE UP (ref 9.9–13.4)
RBC # BLD: 4.19 M/UL — LOW (ref 4.2–5.8)
RBC # FLD: 17.6 % — HIGH (ref 10.3–14.5)
RH IG SCN BLD-IMP: POSITIVE — SIGNIFICANT CHANGE UP
SAO2 % BLDA: 97.7 % — SIGNIFICANT CHANGE UP (ref 94–98)
SODIUM SERPL-SCNC: 133 MMOL/L — LOW (ref 135–145)
SODIUM SERPL-SCNC: 136 MMOL/L — SIGNIFICANT CHANGE UP (ref 135–145)
TSH SERPL-MCNC: 3.44 UIU/ML — SIGNIFICANT CHANGE UP (ref 0.27–4.2)
VIT B12 SERPL-MCNC: 1092 PG/ML — SIGNIFICANT CHANGE UP (ref 232–1245)
WBC # BLD: 16.95 K/UL — HIGH (ref 3.8–10.5)
WBC # FLD AUTO: 16.95 K/UL — HIGH (ref 3.8–10.5)

## 2025-03-30 PROCEDURE — 99223 1ST HOSP IP/OBS HIGH 75: CPT

## 2025-03-30 PROCEDURE — 70450 CT HEAD/BRAIN W/O DYE: CPT | Mod: 26,XU

## 2025-03-30 PROCEDURE — 99232 SBSQ HOSP IP/OBS MODERATE 35: CPT

## 2025-03-30 PROCEDURE — 70498 CT ANGIOGRAPHY NECK: CPT | Mod: 26

## 2025-03-30 PROCEDURE — 71045 X-RAY EXAM CHEST 1 VIEW: CPT | Mod: 26

## 2025-03-30 PROCEDURE — 0042T: CPT

## 2025-03-30 PROCEDURE — 70496 CT ANGIOGRAPHY HEAD: CPT | Mod: 26

## 2025-03-30 RX ORDER — BUMETANIDE 1 MG/1
4 TABLET ORAL EVERY 12 HOURS
Refills: 0 | Status: COMPLETED | OUTPATIENT
Start: 2025-03-30 | End: 2025-03-31

## 2025-03-30 RX ADMIN — Medication 40 MILLIGRAM(S): at 18:15

## 2025-03-30 RX ADMIN — INSULIN GLARGINE-YFGN 18 UNIT(S): 100 INJECTION, SOLUTION SUBCUTANEOUS at 22:02

## 2025-03-30 RX ADMIN — TAMSULOSIN HYDROCHLORIDE 0.4 MILLIGRAM(S): 0.4 CAPSULE ORAL at 22:03

## 2025-03-30 RX ADMIN — Medication 667 MILLIGRAM(S): at 18:16

## 2025-03-30 RX ADMIN — Medication 667 MILLIGRAM(S): at 12:01

## 2025-03-30 RX ADMIN — Medication 100 MILLIGRAM(S): at 12:01

## 2025-03-30 RX ADMIN — Medication 81 MILLIGRAM(S): at 12:01

## 2025-03-30 RX ADMIN — TIOTROPIUM BROMIDE INHALATION SPRAY 2 PUFF(S): 3.12 SPRAY, METERED RESPIRATORY (INHALATION) at 18:16

## 2025-03-30 RX ADMIN — Medication 1 SPRAY(S): at 18:15

## 2025-03-30 RX ADMIN — FOLIC ACID 1 MILLIGRAM(S): 1 TABLET ORAL at 12:02

## 2025-03-30 RX ADMIN — ATORVASTATIN CALCIUM 20 MILLIGRAM(S): 80 TABLET, FILM COATED ORAL at 22:03

## 2025-03-30 RX ADMIN — BUMETANIDE 132 MILLIGRAM(S): 1 TABLET ORAL at 15:16

## 2025-03-30 RX ADMIN — APIXABAN 2.5 MILLIGRAM(S): 2.5 TABLET, FILM COATED ORAL at 18:18

## 2025-03-30 RX ADMIN — INSULIN LISPRO 3 UNIT(S): 100 INJECTION, SOLUTION INTRAVENOUS; SUBCUTANEOUS at 18:15

## 2025-03-30 RX ADMIN — Medication 1 DOSE(S): at 18:15

## 2025-03-30 RX ADMIN — Medication 1 APPLICATION(S): at 07:57

## 2025-03-30 NOTE — CONSULT NOTE ADULT - ASSESSMENT
Assessment:   78 y/o male w/ PMHx CAD s/p CABG, HF (combined HFrEF [EF 25%] and HFpEF [G3DD]) w/ ICD, AFib on Xarelto, severe AS s/p TAVR, COPD on 3-4L home O2, CKD4, HTN, HLD, T2DM, and BPH admitted to hospital 02/2025 for HF evaluation. Ongoing CHF exacerbation now off bumex gtt, new DENISHA on CKD likely 2/2 ongoing cardiorenal and possible component of abdominal compartment syndrome given tremendous ascites, now plan for shiley/HD. Hospital course complicated by worsening renal function necessitating HD and episodes of AMS iso of hypercapneia and worsening renal function.  Code stroke called for L face droop and not speaking. Patient evaluated and without facial droop. Patient attempted to strike physician when painful stimulus presented and then glared at physician with continued absence of speech or following commands. Spontaneously moving all extremities thru out. Patient was switched from Xarelto to Eliquis 03/29/2025 as xarelto contraindicated in HD.    Initial VS during rapid: 102/68    mRS: 0  LKN: 0400 03/30/2025  NIHSS: 10    NOT a tenecteplase candidate due to presentation more consistent w TME than stroke  NOT a mechanical thrombectomy candidate due to no LVO    Impression: aphasia and not following commands iso of elevated wbc, renal failure, hypercapnic respiratory failure. Exam non focal. Presentation more consistent w toxic metabolic encephalopathy than stroke    Diag:  [] can consider MRI brain wwo contrast if patient mentation does not improve as metabolic derangements are addressed  [] STAT repeat CTH wo contrast for acute change in neurologic status  [] EEG to investigate for cortical slowing and triphasic waves; can start with routine eeg and continue for longer duration pending tolerance and results  [] Delirium w/u: Vit. b 12, folate, B6, thiamine, continue trending LFTs, TSH, ammonia  [] infectious work up: blood c/s, urinalysis, urine culture    Meds:  [] Hold narcotics/sedatives as able    misc and manage:  [] general Neuro check q4h  [] Fall and aspiration precautions  [] can consider Psych consult if symptoms not resolving    *case discussed w Dr. Davies

## 2025-03-30 NOTE — PROGRESS NOTE ADULT - ASSESSMENT
78 y/o male w/ PMHx CAD s/p CABG, HF (combined HFrEF [EF 25%] and HFpEF [G3DD]) w/ ICD, AFib on Xarelto, severe AS s/p TAVR, COPD on 3-4L home O2, CKD4, HTN, HLD, T2DM, and BPH who presents as a transfer from [facility] for HF evaluation. Ongoing CHF exacerbation now off bumex gtt, new DENISHA on CKD likely 2/2 ongoing cardiorenal and possible component of abdominal compartment syndrome given tremendous ascites, now s/p tunnelled HD catheter.  RRT/Code Stroke on 3/30/25 for lethargy and facial droop, CT negative, likely in the setting of hypercarbia, placed on bipap with improvement.

## 2025-03-30 NOTE — STROKE CODE NOTE - NIH STROKE SCALE: 1C. LOC COMMANDS, QM
ferrous sulfate 325 (65 Fe) MG tablet, Take 325 mg by mouth daily (with breakfast), Disp: , Rfl:     tiZANidine (ZANAFLEX) 4 MG tablet, Take 4 mg by mouth every 6 hours as needed, Disp: , Rfl:     busPIRone (BUSPAR) 10 MG tablet, Take 10 mg by mouth 2 times daily, Disp: , Rfl:     TRAZODONE HCL PO, Take 50 mg by mouth daily as needed , Disp: , Rfl:     vitamin D (CHOLECALCIFEROL) 1000 UNIT TABS tablet, Take 5,000 Units by mouth daily , Disp: , Rfl:     Cyanocobalamin (VITAMIN B-12) 500 MCG SUBL, Place 4 drops under the tongue daily. , Disp: , Rfl:     naproxen (NAPROSYN) 500 MG tablet, Take 1 tablet by mouth 2 times daily (with meals) for 30 doses, Disp: 30 tablet, Rfl: 0        Subjective:      Review of Systems   Constitutional: Positive for activity change and fatigue. Negative for chills, diaphoresis and fever. Respiratory: Negative for cough, chest tightness and shortness of breath. Cardiovascular: Negative for chest pain and palpitations. Gastrointestinal: Positive for abdominal pain, constipation, diarrhea and nausea. Negative for vomiting. IBS   Endocrine: Negative for heat intolerance. Genitourinary: Negative for difficulty urinating, frequency and urgency. Musculoskeletal: Positive for arthralgias, back pain, gait problem, myalgias, neck pain and neck stiffness. Skin: Negative for color change, rash and wound. Allergic/Immunologic: Negative for environmental allergies and food allergies. Neurological: Positive for numbness and headaches. Negative for dizziness, seizures and light-headedness. Hematological: Does not bruise/bleed easily. Psychiatric/Behavioral: Positive for sleep disturbance. The patient is nervous/anxious.          Objective:      Vitals   Weight: 191 lb (86.6 kg)   Height: 5' 5.5\" (1.664 m)            Physical Exam   Constitutional: She is oriented to person, place, and time. She appears well-developed and well-nourished. No distress.    HENT:   Head: (2) Performs neither task correctly

## 2025-03-30 NOTE — PROGRESS NOTE ADULT - SUBJECTIVE AND OBJECTIVE BOX
PROGRESS NOTE:     Patient is a 79y old  Male who presents with a chief complaint of HF eval (30 Mar 2025 06:34)      SUBJECTIVE / OVERNIGHT EVENTS:RRT/code stroke this morning for lethargic, and is not following commands, new L facial droop. Please see RRT/chart notes. Patient had CTH/ CTA Motion degraded exam; within these constraints, no CT perfusion evidence of acute core infarct. No large vessel occlusion. He was noted to have hypercardia on blood gas and was placed on bipap with improvement in pC02 and mental status         MEDICATIONS  (STANDING):  allopurinol 100 milliGRAM(s) Oral daily  apixaban 2.5 milliGRAM(s) Oral two times a day  aspirin enteric coated 81 milliGRAM(s) Oral daily  atorvastatin 20 milliGRAM(s) Oral at bedtime  calcium acetate 667 milliGRAM(s) Oral three times a day with meals  chlorhexidine 2% Cloths 1 Application(s) Topical daily  chlorhexidine 4% Liquid 1 Application(s) Topical <User Schedule>  dextrose 5%. 1000 milliLiter(s) (100 mL/Hr) IV Continuous <Continuous>  dextrose 5%. 1000 milliLiter(s) (50 mL/Hr) IV Continuous <Continuous>  dextrose 50% Injectable 25 Gram(s) IV Push once  dextrose 50% Injectable 12.5 Gram(s) IV Push once  dextrose 50% Injectable 25 Gram(s) IV Push once  epoetin lashawn (EPOGEN) Injectable 66422 Unit(s) IV Push <User Schedule>  fluticasone propionate/ salmeterol 250-50 MICROgram(s) Diskus 1 Dose(s) Inhalation two times a day  folic acid 1 milliGRAM(s) Oral daily  glucagon  Injectable 1 milliGRAM(s) IntraMuscular once  insulin glargine Injectable (LANTUS) 18 Unit(s) SubCutaneous at bedtime  insulin lispro (ADMELOG) corrective regimen sliding scale   SubCutaneous three times a day before meals  insulin lispro Injectable (ADMELOG) 3 Unit(s) SubCutaneous three times a day before meals  metoprolol succinate ER 25 milliGRAM(s) Oral daily  pantoprazole    Tablet 40 milliGRAM(s) Oral every 12 hours  polyethylene glycol 3350 17 Gram(s) Oral two times a day  psyllium Powder 1 Packet(s) Oral daily  sodium chloride 0.65% Nasal 1 Spray(s) Both Nostrils two times a day  tamsulosin 0.4 milliGRAM(s) Oral at bedtime  tiotropium 2.5 MICROgram(s) Inhaler 2 Puff(s) Inhalation daily    MEDICATIONS  (PRN):  acetaminophen     Tablet .. 650 milliGRAM(s) Oral every 6 hours PRN Temp greater or equal to 38C (100.4F), Mild Pain (1 - 3)  albuterol/ipratropium for Nebulization 3 milliLiter(s) Nebulizer every 6 hours PRN Shortness of Breath and/or Wheezing  bisacodyl 5 milliGRAM(s) Oral every 12 hours PRN Constipation  dextrose Oral Gel 15 Gram(s) Oral once PRN Blood Glucose LESS THAN 70 milliGRAM(s)/deciliter  dextrose Oral Gel 15 Gram(s) Oral once PRN Blood Glucose LESS THAN 70 milliGRAM(s)/deciliter  fentaNYL    Injectable 25 MICROGram(s) IV Push every 5 minutes PRN Moderate Pain (4 - 6)  melatonin 3 milliGRAM(s) Oral at bedtime PRN Insomnia  ondansetron Injectable 4 milliGRAM(s) IV Push every 6 hours PRN Nausea and/or Vomiting  ondansetron Injectable 4 milliGRAM(s) IV Push once PRN Nausea and/or Vomiting  oxyCODONE    IR 5 milliGRAM(s) Oral once PRN Moderate Pain (4 - 6)  sodium chloride 0.9% lock flush 10 milliLiter(s) IV Push every 1 hour PRN Pre/post blood products, medications, blood draw, and to maintain line patency      CAPILLARY BLOOD GLUCOSE      POCT Blood Glucose.: 112 mg/dL (30 Mar 2025 12:42)  POCT Blood Glucose.: 126 mg/dL (30 Mar 2025 08:55)  POCT Blood Glucose.: 127 mg/dL (30 Mar 2025 06:01)  POCT Blood Glucose.: 144 mg/dL (29 Mar 2025 21:27)  POCT Blood Glucose.: 136 mg/dL (29 Mar 2025 17:33)    I&O's Summary    29 Mar 2025 07:01  -  30 Mar 2025 07:00  --------------------------------------------------------  IN: 50 mL / OUT: 0 mL / NET: 50 mL    30 Mar 2025 07:01  -  30 Mar 2025 13:48  --------------------------------------------------------  IN: 100 mL / OUT: 150 mL / NET: -50 mL        PHYSICAL EXAM:  Vital Signs Last 24 Hrs  T(C): 36.2 (30 Mar 2025 11:21), Max: 36.3 (29 Mar 2025 16:10)  T(F): 97.2 (30 Mar 2025 11:21), Max: 97.4 (30 Mar 2025 00:43)  HR: 70 (30 Mar 2025 11:21) (63 - 73)  BP: 115/72 (30 Mar 2025 11:21) (102/68 - 116/72)  BP(mean): --  RR: 18 (30 Mar 2025 12:59) (18 - 18)  SpO2: 96% (30 Mar 2025 12:59) (92% - 100%)    Parameters below as of 30 Mar 2025 12:59  Patient On (Oxygen Delivery Method): BiPAP/CPAP      PHYSICAL EXAM:  CONSTITUTIONAL: NAD  EYES: PERRLA; conjunctiva and sclera clear  NECK: Supple  RESPIRATORY: Normal respiratory effort; CTAB  CARDIOVASCULAR: RRR, S1S2  ABDOMEN: distended  MUSCLOSKELETAL:  anasarca  PSYCH: A+O x 2-3, More lethargic this morning (3/30/25)  NEUROLOGY: CN 2-12 are intact       LABS:                        11.3   16.95 )-----------( 156      ( 30 Mar 2025 06:20 )             37.7     03-30    136  |  94[L]  |  63[H]  ----------------------------<  126[H]  4.3   |  27  |  5.55[H]    Ca    8.9      30 Mar 2025 06:20  Phos  5.5     03-30  Mg     2.6     03-30    TPro  7.4  /  Alb  3.4  /  TBili  0.7  /  DBili  x   /  AST  37  /  ALT  <5[L]  /  AlkPhos  237[H]  03-30    PT/INR - ( 30 Mar 2025 06:20 )   PT: 13.0 sec;   INR: 1.13 ratio         PTT - ( 30 Mar 2025 06:20 )  PTT:30.4 sec      Urinalysis Basic - ( 30 Mar 2025 06:20 )    Color: x / Appearance: x / SG: x / pH: x  Gluc: 126 mg/dL / Ketone: x  / Bili: x / Urobili: x   Blood: x / Protein: x / Nitrite: x   Leuk Esterase: x / RBC: x / WBC x   Sq Epi: x / Non Sq Epi: x / Bacteria: x          RADIOLOGY & ADDITIONAL TESTS:  Results Reviewed:   Imaging Personally Reviewed:  Electrocardiogram Personally Reviewed:    COORDINATION OF CARE:  Care Discussed with Consultants/Other Providers [Y/N]:  Prior or Outpatient Records Reviewed [Y/N]:

## 2025-03-30 NOTE — STROKE CODE NOTE - DISPOSITION
patient presentation and exam non focal and more consistent with TME than stroke. Decision made to not given tenecteplase

## 2025-03-30 NOTE — CONSULT NOTE ADULT - ATTENDING COMMENTS
Patient seen and examined - arouses to voice - can intermittently follow commands bilaterally - raise right UE - no clear asymmetry but limited exam due to difficulty maintaining alertness.  Head CT reviewed - no acute findings.  Labs notable for hypercapnea, increased creatine and rising WBC.  Suspect alternation related to toxic metabolic cause.

## 2025-03-30 NOTE — CHART NOTE - NSCHARTNOTEFT_GEN_A_CORE
Patient is a 79y old  Male who presents with a chief complaint of HF eval (30 Mar 2025 06:34)  __ RRT called and subsequently Code stroke was called __ pt is lethargic,             Review of symptoms:   Cardiac:  No chest pain. No dyspnea. No palpitations.  Respiratory: No cough. No dyspnea  Gastrointestinal: No diarrhea. No abdominal pain. No bleeding.           MEDICATIONS  (STANDING):  allopurinol 100 milliGRAM(s) Oral daily  apixaban 2.5 milliGRAM(s) Oral two times a day  aspirin enteric coated 81 milliGRAM(s) Oral daily  atorvastatin 20 milliGRAM(s) Oral at bedtime  calcium acetate 667 milliGRAM(s) Oral three times a day with meals  chlorhexidine 2% Cloths 1 Application(s) Topical daily  chlorhexidine 4% Liquid 1 Application(s) Topical <User Schedule>  dextrose 5%. 1000 milliLiter(s) (100 mL/Hr) IV Continuous <Continuous>  dextrose 5%. 1000 milliLiter(s) (50 mL/Hr) IV Continuous <Continuous>  dextrose 50% Injectable 25 Gram(s) IV Push once  dextrose 50% Injectable 12.5 Gram(s) IV Push once  dextrose 50% Injectable 25 Gram(s) IV Push once  epoetin lashawn (EPOGEN) Injectable 72869 Unit(s) IV Push <User Schedule>  fluticasone propionate/ salmeterol 250-50 MICROgram(s) Diskus 1 Dose(s) Inhalation two times a day  folic acid 1 milliGRAM(s) Oral daily  glucagon  Injectable 1 milliGRAM(s) IntraMuscular once  insulin glargine Injectable (LANTUS) 18 Unit(s) SubCutaneous at bedtime  insulin lispro (ADMELOG) corrective regimen sliding scale   SubCutaneous three times a day before meals  insulin lispro Injectable (ADMELOG) 3 Unit(s) SubCutaneous three times a day before meals  metoprolol succinate ER 25 milliGRAM(s) Oral daily  pantoprazole    Tablet 40 milliGRAM(s) Oral every 12 hours  polyethylene glycol 3350 17 Gram(s) Oral two times a day  psyllium Powder 1 Packet(s) Oral daily  sodium chloride 0.65% Nasal 1 Spray(s) Both Nostrils two times a day  tamsulosin 0.4 milliGRAM(s) Oral at bedtime  tiotropium 2.5 MICROgram(s) Inhaler 2 Puff(s) Inhalation daily    MEDICATIONS  (PRN):  acetaminophen     Tablet .. 650 milliGRAM(s) Oral every 6 hours PRN Temp greater or equal to 38C (100.4F), Mild Pain (1 - 3)  albuterol/ipratropium for Nebulization 3 milliLiter(s) Nebulizer every 6 hours PRN Shortness of Breath and/or Wheezing  bisacodyl 5 milliGRAM(s) Oral every 12 hours PRN Constipation  dextrose Oral Gel 15 Gram(s) Oral once PRN Blood Glucose LESS THAN 70 milliGRAM(s)/deciliter  dextrose Oral Gel 15 Gram(s) Oral once PRN Blood Glucose LESS THAN 70 milliGRAM(s)/deciliter  fentaNYL    Injectable 25 MICROGram(s) IV Push every 5 minutes PRN Moderate Pain (4 - 6)  melatonin 3 milliGRAM(s) Oral at bedtime PRN Insomnia  ondansetron Injectable 4 milliGRAM(s) IV Push every 6 hours PRN Nausea and/or Vomiting  ondansetron Injectable 4 milliGRAM(s) IV Push once PRN Nausea and/or Vomiting  oxyCODONE    IR 5 milliGRAM(s) Oral once PRN Moderate Pain (4 - 6)  sodium chloride 0.9% lock flush 10 milliLiter(s) IV Push every 1 hour PRN Pre/post blood products, medications, blood draw, and to maintain line patency            Vital Signs Last 24 Hrs  T(C): 36.3 (30 Mar 2025 04:26), Max: 36.5 (29 Mar 2025 11:54)  T(F): 97.3 (30 Mar 2025 04:26), Max: 97.7 (29 Mar 2025 11:54)  HR: 73 (30 Mar 2025 06:59) (69 - 73)  BP: 102/68 (30 Mar 2025 04:26) (102/68 - 116/72)  BP(mean): --  RR: 18 (30 Mar 2025 04:26) (18 - 18)  SpO2: 94% (30 Mar 2025 06:59) (92% - 100%)    Parameters below as of 30 Mar 2025 04:26  Patient On (Oxygen Delivery Method): nasal cannula  O2 Flow (L/min): 4            INTERPRETATION OF TELEMETRY:    ECG:        LABS:                        11.3   16.95 )-----------( 156      ( 30 Mar 2025 06:20 )             37.7     03-30    136  |  94[L]  |  63[H]  ----------------------------<  126[H]  4.3   |  27  |  5.55[H]    Ca    8.9      30 Mar 2025 06:20  Phos  5.5     03-30  Mg     2.6     03-30    TPro  7.4  /  Alb  3.4  /  TBili  0.7  /  DBili  x   /  AST  37  /  ALT  <5[L]  /  AlkPhos  237[H]  03-30        PT/INR - ( 30 Mar 2025 06:20 )   PT: 13.0 sec;   INR: 1.13 ratio         PTT - ( 30 Mar 2025 06:20 )  PTT:30.4 sec  Urinalysis Basic - ( 30 Mar 2025 06:20 )    Color: x / Appearance: x / SG: x / pH: x  Gluc: 126 mg/dL / Ketone: x  / Bili: x / Urobili: x   Blood: x / Protein: x / Nitrite: x   Leuk Esterase: x / RBC: x / WBC x   Sq Epi: x / Non Sq Epi: x / Bacteria: x      I&O's Summary    29 Mar 2025 07:01  -  30 Mar 2025 07:00  --------------------------------------------------------  IN: 50 mL / OUT: 0 mL / NET: 50 mL      BNP  LIVER FUNCTIONS - ( 30 Mar 2025 06:20 )  Alb: 3.4 g/dL / Pro: 7.4 g/dL / ALK PHOS: 237 U/L / ALT: <5 U/L / AST: 37 U/L / GGT: x           RADIOLOGY & ADDITIONAL STUDIES:        Physical Exam:  General; Pt appears well and in no acute distress   HEENT:   Normal oral mucosa, PERRL, EOMI	  Lymphatic: No lymphadenopathy , no edema  Cardiovascular: Normal S1 S2, No JVD, No murmurs , Peripheral pulses palpable 2+ bilaterally  Respiratory: Lungs clear to auscultation, normal effort 	  Gastrointestinal:  Soft, Non-tender, + BS	  Skin: No rashes, No ecchymoses, No cyanosis, warm to touch  Musculoskeletal: Normal range of motion, normal strength  Neurology: A&O x 4, cranial nerves intact, motor and sensory strenghts are intact, no focal weakness, no drift, no dysarthria, no dysmetria,   Psychiatry:  Mood & affect appropriate              Time spend-- History and Physical, Telehealth, History of Present Illness, Allergies/Medications, Patient History, Risk Assessment, Physical Exam, Labs and Results, Assessment and Plan, discussing with patient and Attending Patient is a 79y old  Male who presents with a chief complaint of HF eval (30 Mar 2025 06:34)  __ RRT called and subsequently Code stroke was called __ pt is lethargic, and is not following commands, new L facial droop as per nursing staff   __       Review of symptoms:   unable to obtain due to cognitive impairment       MEDICATIONS  (STANDING):  allopurinol 100 milliGRAM(s) Oral daily  apixaban 2.5 milliGRAM(s) Oral two times a day  aspirin enteric coated 81 milliGRAM(s) Oral daily  atorvastatin 20 milliGRAM(s) Oral at bedtime  calcium acetate 667 milliGRAM(s) Oral three times a day with meals  chlorhexidine 2% Cloths 1 Application(s) Topical daily  chlorhexidine 4% Liquid 1 Application(s) Topical <User Schedule>  dextrose 5%. 1000 milliLiter(s) (100 mL/Hr) IV Continuous <Continuous>  dextrose 5%. 1000 milliLiter(s) (50 mL/Hr) IV Continuous <Continuous>  dextrose 50% Injectable 25 Gram(s) IV Push once  dextrose 50% Injectable 12.5 Gram(s) IV Push once  dextrose 50% Injectable 25 Gram(s) IV Push once  epoetin lashawn (EPOGEN) Injectable 31878 Unit(s) IV Push <User Schedule>  fluticasone propionate/ salmeterol 250-50 MICROgram(s) Diskus 1 Dose(s) Inhalation two times a day  folic acid 1 milliGRAM(s) Oral daily  glucagon  Injectable 1 milliGRAM(s) IntraMuscular once  insulin glargine Injectable (LANTUS) 18 Unit(s) SubCutaneous at bedtime  insulin lispro (ADMELOG) corrective regimen sliding scale   SubCutaneous three times a day before meals  insulin lispro Injectable (ADMELOG) 3 Unit(s) SubCutaneous three times a day before meals  metoprolol succinate ER 25 milliGRAM(s) Oral daily  pantoprazole    Tablet 40 milliGRAM(s) Oral every 12 hours  polyethylene glycol 3350 17 Gram(s) Oral two times a day  psyllium Powder 1 Packet(s) Oral daily  sodium chloride 0.65% Nasal 1 Spray(s) Both Nostrils two times a day  tamsulosin 0.4 milliGRAM(s) Oral at bedtime  tiotropium 2.5 MICROgram(s) Inhaler 2 Puff(s) Inhalation daily    MEDICATIONS  (PRN):  acetaminophen     Tablet .. 650 milliGRAM(s) Oral every 6 hours PRN Temp greater or equal to 38C (100.4F), Mild Pain (1 - 3)  albuterol/ipratropium for Nebulization 3 milliLiter(s) Nebulizer every 6 hours PRN Shortness of Breath and/or Wheezing  bisacodyl 5 milliGRAM(s) Oral every 12 hours PRN Constipation  dextrose Oral Gel 15 Gram(s) Oral once PRN Blood Glucose LESS THAN 70 milliGRAM(s)/deciliter  dextrose Oral Gel 15 Gram(s) Oral once PRN Blood Glucose LESS THAN 70 milliGRAM(s)/deciliter  fentaNYL    Injectable 25 MICROGram(s) IV Push every 5 minutes PRN Moderate Pain (4 - 6)  melatonin 3 milliGRAM(s) Oral at bedtime PRN Insomnia  ondansetron Injectable 4 milliGRAM(s) IV Push every 6 hours PRN Nausea and/or Vomiting  ondansetron Injectable 4 milliGRAM(s) IV Push once PRN Nausea and/or Vomiting  oxyCODONE    IR 5 milliGRAM(s) Oral once PRN Moderate Pain (4 - 6)  sodium chloride 0.9% lock flush 10 milliLiter(s) IV Push every 1 hour PRN Pre/post blood products, medications, blood draw, and to maintain line patency            Vital Signs Last 24 Hrs  T(C): 36.3 (30 Mar 2025 04:26), Max: 36.5 (29 Mar 2025 11:54)  T(F): 97.3 (30 Mar 2025 04:26), Max: 97.7 (29 Mar 2025 11:54)  HR: 73 (30 Mar 2025 06:59) (69 - 73)  BP: 102/68 (30 Mar 2025 04:26) (102/68 - 116/72)  BP(mean): --  RR: 18 (30 Mar 2025 04:26) (18 - 18)  SpO2: 94% (30 Mar 2025 06:59) (92% - 100%)    Parameters below as of 30 Mar 2025 04:26  Patient On (Oxygen Delivery Method): nasal cannula  O2 Flow (L/min): 4            INTERPRETATION OF TELEMETRY: V paced 70's     ECG:        LABS:                        11.3   16.95 )-----------( 156      ( 30 Mar 2025 06:20 )             37.7     03-30    136  |  94[L]  |  63[H]  ----------------------------<  126[H]  4.3   |  27  |  5.55[H]    Ca    8.9      30 Mar 2025 06:20  Phos  5.5     03-30  Mg     2.6     03-30    TPro  7.4  /  Alb  3.4  /  TBili  0.7  /  DBili  x   /  AST  37  /  ALT  <5[L]  /  AlkPhos  237[H]  03-30        PT/INR - ( 30 Mar 2025 06:20 )   PT: 13.0 sec;   INR: 1.13 ratio         PTT - ( 30 Mar 2025 06:20 )  PTT:30.4 sec  Urinalysis Basic - ( 30 Mar 2025 06:20 )    Color: x / Appearance: x / SG: x / pH: x  Gluc: 126 mg/dL / Ketone: x  / Bili: x / Urobili: x   Blood: x / Protein: x / Nitrite: x   Leuk Esterase: x / RBC: x / WBC x   Sq Epi: x / Non Sq Epi: x / Bacteria: x      I&O's Summary    29 Mar 2025 07:01  -  30 Mar 2025 07:00  --------------------------------------------------------  IN: 50 mL / OUT: 0 mL / NET: 50 mL      BNP  LIVER FUNCTIONS - ( 30 Mar 2025 06:20 )  Alb: 3.4 g/dL / Pro: 7.4 g/dL / ALK PHOS: 237 U/L / ALT: <5 U/L / AST: 37 U/L / GGT: x           RADIOLOGY & ADDITIONAL STUDIES:        Physical Exam:  General; Pt appears ill, not in acute distress   HEENT:   Normal oral mucosa, PERRL, EOMI	  Lymphatic: No lymphadenopathy , no edema  Cardiovascular: Normal S1 S2, No JVD, No murmurs , Peripheral pulses palpable 2+ bilaterally  Respiratory: Lungs clear to auscultation, normal effort 	  Gastrointestinal:  Soft, Non-tender, + BS	  Skin: No rashes, No ecchymoses, No cyanosis, warm to touch  Musculoskeletal: not moving extremities to command   Neurology: lethargic, R pupil 3 mm, and L pupil 2 mm, cranial nerves-- unable to assess due to cognitive impairment, pt started to open eyes and intermittently responding to commands,   _ L facial droop present, no dysarthria, no dysmetria, __ unable to fully assess due to pt not following commands   - pt was swinging with a fist at the neurology during neuro exam,   Psychiatry:  lethargic       A/P  __ AMS__ possibly due to acute metabolic encephalopathy, R/O CVA   -- as per neuro__ c/f acute metabolic encephalopathy and low c/f stroke,  -- pt was brought to CT department- CTH neg,     CT PERFUSION:  Motion degraded exam; within these constraints, no CT perfusion evidence of acute core infarct. MRI brain recommended if symptoms persist. Small volume (5 mL) questionable territory at risk within the right temporoparietal subcortical white matter, which may alternatively represent slow flow phenomenon versus imaging artifact.    CTA NECK:  Calcifications within the left vertebral artery causing up to mild stenosis. No evidence of hemodynamically flow-limiting stenosis within the bilateral cervical carotid arteries.    CTA HEAD:  Calcifications causing mildstenosis of the left supraclinoid ICA and moderate stenosis of the right supraclinoid ICA.      __ blood gas_ 7.23/83 with bicarb of 27 __ pt is compensating, but remains lethargic __ placed on Bipap at 7 am, __ 14/7/40 %>> repeat VBG at 11 am,  -- WBC__ 15>16   __ as per neuro__ c/f acute metabolic encephalopathy and low c/f stroke, CT. CTA/h/n, CT perfusion as above   [] EEG to investigate for cortical slowing and triphasic waves; can start with routine eeg and continue for longer duration pending tolerance and results  [] Delirium w/u: Vit. b 12, folate, B6, thiamine, continue trending LFTs, TSH, ammonia  [] infectious work up: blood c/s, urinalysis, urine culture__ UA ordered, f/u with ID for further recs   __ [] can consider MRI brain wwo contrast if patient mentation does not improve as metabolic derangements are addressed  [] as per neuro , neuro checks q 4 hrs   __   - -discussed with attn, Dr Campos  -- called and updated spouse over the phone Mrs. Tracie Solares (wife) [phone] (cell).   -- spoke with pt's cousin Joceline Wright at the bedside  -- spoke with nursing staff, MARCELINOM, MAR. neurology, and RT       __ spend 60 min Patient is a 79y old  Male who presents with a chief complaint of HF eval (30 Mar 2025 06:34)  __ RRT called and subsequently Code stroke was called __ pt is lethargic, and is not following commands, new L facial droop as per nursing staff   __       Review of symptoms:   unable to obtain due to cognitive impairment       MEDICATIONS  (STANDING):  allopurinol 100 milliGRAM(s) Oral daily  apixaban 2.5 milliGRAM(s) Oral two times a day  aspirin enteric coated 81 milliGRAM(s) Oral daily  atorvastatin 20 milliGRAM(s) Oral at bedtime  calcium acetate 667 milliGRAM(s) Oral three times a day with meals  chlorhexidine 2% Cloths 1 Application(s) Topical daily  chlorhexidine 4% Liquid 1 Application(s) Topical <User Schedule>  dextrose 5%. 1000 milliLiter(s) (100 mL/Hr) IV Continuous <Continuous>  dextrose 5%. 1000 milliLiter(s) (50 mL/Hr) IV Continuous <Continuous>  dextrose 50% Injectable 25 Gram(s) IV Push once  dextrose 50% Injectable 12.5 Gram(s) IV Push once  dextrose 50% Injectable 25 Gram(s) IV Push once  epoetin lashawn (EPOGEN) Injectable 33286 Unit(s) IV Push <User Schedule>  fluticasone propionate/ salmeterol 250-50 MICROgram(s) Diskus 1 Dose(s) Inhalation two times a day  folic acid 1 milliGRAM(s) Oral daily  glucagon  Injectable 1 milliGRAM(s) IntraMuscular once  insulin glargine Injectable (LANTUS) 18 Unit(s) SubCutaneous at bedtime  insulin lispro (ADMELOG) corrective regimen sliding scale   SubCutaneous three times a day before meals  insulin lispro Injectable (ADMELOG) 3 Unit(s) SubCutaneous three times a day before meals  metoprolol succinate ER 25 milliGRAM(s) Oral daily  pantoprazole    Tablet 40 milliGRAM(s) Oral every 12 hours  polyethylene glycol 3350 17 Gram(s) Oral two times a day  psyllium Powder 1 Packet(s) Oral daily  sodium chloride 0.65% Nasal 1 Spray(s) Both Nostrils two times a day  tamsulosin 0.4 milliGRAM(s) Oral at bedtime  tiotropium 2.5 MICROgram(s) Inhaler 2 Puff(s) Inhalation daily    MEDICATIONS  (PRN):  acetaminophen     Tablet .. 650 milliGRAM(s) Oral every 6 hours PRN Temp greater or equal to 38C (100.4F), Mild Pain (1 - 3)  albuterol/ipratropium for Nebulization 3 milliLiter(s) Nebulizer every 6 hours PRN Shortness of Breath and/or Wheezing  bisacodyl 5 milliGRAM(s) Oral every 12 hours PRN Constipation  dextrose Oral Gel 15 Gram(s) Oral once PRN Blood Glucose LESS THAN 70 milliGRAM(s)/deciliter  dextrose Oral Gel 15 Gram(s) Oral once PRN Blood Glucose LESS THAN 70 milliGRAM(s)/deciliter  fentaNYL    Injectable 25 MICROGram(s) IV Push every 5 minutes PRN Moderate Pain (4 - 6)  melatonin 3 milliGRAM(s) Oral at bedtime PRN Insomnia  ondansetron Injectable 4 milliGRAM(s) IV Push every 6 hours PRN Nausea and/or Vomiting  ondansetron Injectable 4 milliGRAM(s) IV Push once PRN Nausea and/or Vomiting  oxyCODONE    IR 5 milliGRAM(s) Oral once PRN Moderate Pain (4 - 6)  sodium chloride 0.9% lock flush 10 milliLiter(s) IV Push every 1 hour PRN Pre/post blood products, medications, blood draw, and to maintain line patency            Vital Signs Last 24 Hrs  T(C): 36.3 (30 Mar 2025 04:26), Max: 36.5 (29 Mar 2025 11:54)  T(F): 97.3 (30 Mar 2025 04:26), Max: 97.7 (29 Mar 2025 11:54)  HR: 73 (30 Mar 2025 06:59) (69 - 73)  BP: 102/68 (30 Mar 2025 04:26) (102/68 - 116/72)  BP(mean): --  RR: 18 (30 Mar 2025 04:26) (18 - 18)  SpO2: 94% (30 Mar 2025 06:59) (92% - 100%)    Parameters below as of 30 Mar 2025 04:26  Patient On (Oxygen Delivery Method): nasal cannula  O2 Flow (L/min): 4            INTERPRETATION OF TELEMETRY: V paced 70's     ECG:        LABS:                        11.3   16.95 )-----------( 156      ( 30 Mar 2025 06:20 )             37.7     03-30    136  |  94[L]  |  63[H]  ----------------------------<  126[H]  4.3   |  27  |  5.55[H]    Ca    8.9      30 Mar 2025 06:20  Phos  5.5     03-30  Mg     2.6     03-30    TPro  7.4  /  Alb  3.4  /  TBili  0.7  /  DBili  x   /  AST  37  /  ALT  <5[L]  /  AlkPhos  237[H]  03-30        PT/INR - ( 30 Mar 2025 06:20 )   PT: 13.0 sec;   INR: 1.13 ratio         PTT - ( 30 Mar 2025 06:20 )  PTT:30.4 sec  Urinalysis Basic - ( 30 Mar 2025 06:20 )    Color: x / Appearance: x / SG: x / pH: x  Gluc: 126 mg/dL / Ketone: x  / Bili: x / Urobili: x   Blood: x / Protein: x / Nitrite: x   Leuk Esterase: x / RBC: x / WBC x   Sq Epi: x / Non Sq Epi: x / Bacteria: x      I&O's Summary    29 Mar 2025 07:01  -  30 Mar 2025 07:00  --------------------------------------------------------  IN: 50 mL / OUT: 0 mL / NET: 50 mL      BNP  LIVER FUNCTIONS - ( 30 Mar 2025 06:20 )  Alb: 3.4 g/dL / Pro: 7.4 g/dL / ALK PHOS: 237 U/L / ALT: <5 U/L / AST: 37 U/L / GGT: x           RADIOLOGY & ADDITIONAL STUDIES:        Physical Exam:  General; Pt appears ill, not in acute distress   HEENT:   Normal oral mucosa, PERRL, EOMI	  Lymphatic: No lymphadenopathy , no edema  Cardiovascular: Normal S1 S2, No JVD, No murmurs , Peripheral pulses palpable 2+ bilaterally  Respiratory: Lungs clear to auscultation, normal effort 	  Gastrointestinal:  Soft, Non-tender, + BS	  Skin: No rashes, No ecchymoses, No cyanosis, warm to touch  Musculoskeletal: not moving extremities to command   Neurology: lethargic, R pupil 3 mm, and L pupil 2 mm, cranial nerves-- unable to assess due to cognitive impairment, pt started to open eyes and intermittently responding to commands,   _ L facial droop present, no dysarthria, no dysmetria, __ unable to fully assess due to pt not following commands   - pt was swinging with a fist at the neurology during neuro exam,   Psychiatry:  lethargic       A/P  __ AMS__ possibly due to acute metabolic encephalopathy, R/O CVA   -- as per neuro__ c/f acute metabolic encephalopathy and low c/f stroke,  -- pt was brought to CT department- CTH neg,     CT PERFUSION:  Motion degraded exam; within these constraints, no CT perfusion evidence of acute core infarct. MRI brain recommended if symptoms persist. Small volume (5 mL) questionable territory at risk within the right temporoparietal subcortical white matter, which may alternatively represent slow flow phenomenon versus imaging artifact.    CTA NECK:  Calcifications within the left vertebral artery causing up to mild stenosis. No evidence of hemodynamically flow-limiting stenosis within the bilateral cervical carotid arteries.    CTA HEAD:  Calcifications causing mildstenosis of the left supraclinoid ICA and moderate stenosis of the right supraclinoid ICA.      __ blood gas_ 7.23/83 with bicarb of 27 __ pt is compensating, but remains lethargic __ placed on Bipap at 7 am, __ 14/7/40 %>> repeat VBG at 11 am,  -- WBC__ 15>16   __ as per neuro__ c/f acute metabolic encephalopathy and low c/f stroke, CT. CTA/h/n, CT perfusion as above   [] EEG to investigate for cortical slowing and triphasic waves; can start with routine eeg and continue for longer duration pending tolerance and results  [] Delirium w/u: Vit. b 12, folate, B6, thiamine, continue trending LFTs, TSH, ammonia  [] infectious work up: blood c/s, urinalysis, urine culture__ UA ordered, f/u with ID for further recs   __ [] can consider MRI brain wwo contrast if patient mentation does not improve as metabolic derangements are addressed  [] as per neuro , neuro checks q 4 hrs   __   - -discussed with attn, Dr Campos  -- called and updated spouse over the phone Mrs. Tracie Solares (wife) [phone] (cell).   -- spoke with pt's cousin Joceline Wright at the bedside  -- spoke with nursing staff, ANM, MAR. neurology, and RT       __TIME SPENT:  60 minutes of time spent providing medical care for patient's acute illness/conditions that impairs at least one vital organ system and/or poses a high risk of imminent or life threatening deterioration in the patient's condition. It includes time spent evaluating and treating the patient's acute illness as well as time spent reviewing labs, radiology, discussing goals of care with patient and/or patient's family, and discussing the case with a multidisciplinary team, in an effort to prevent further life threatening deterioration or end organ damage. This time is independent of any procedures performed.

## 2025-03-30 NOTE — PROGRESS NOTE ADULT - PROBLEM SELECTOR PLAN 1
- Continue monitoring Cr and urine output.   - now on HD, pulm edema on CXR which is likely contributing to his hypercarbia.   -Team discussed with nephrology, no HD today.   -c/w bipap  -will give bumex today as he wont be dialyzed today and has evidence of pulmonary edema.   - s/p tunneled HD catheter on 3/28  - renal follow up

## 2025-03-30 NOTE — PROVIDER CONTACT NOTE (CHANGE IN STATUS NOTIFICATION) - SITUATION
AMS with worsening lethargy but sustained wakening. Left facial droop noted, pupils sluggish to light, pt not responding to questions, a&ox1, pt not able to tolerate PO meds this AM.

## 2025-03-30 NOTE — PROGRESS NOTE ADULT - PROBLEM SELECTOR PLAN 3
- - - - EF 30% with severe tricuspid regurg and PASP 55mmHG on 2/7/24. Dry wt is 243 lbs.   - s/p bumex 4mg iv TID   -will give bumex today as he wont be dialyzed today and has evidence of pulmonary edema.   - GDMT:  Metoprolol succinate 50mg day. Holding entresto and aldactone for DENISHA. Not on SGLT2.  - Hydralazine/Nitrate: hydral 50mg TID. Isordil 20mg TID   - Cardiology following, patient declined RHC and cardiomems  - per GOC with wife; pt dnr/dni but they wish to keep ICD active  - remove ro

## 2025-03-30 NOTE — CONSULT NOTE ADULT - SUBJECTIVE AND OBJECTIVE BOX
**STROKE CODE CONSULT NOTE**    Last known well time: 0400 03/30/2025    HPI:  78 y/o male w/ PMHx CAD s/p CABG, HF (combined HFrEF [EF 25%] and HFpEF [G3DD]) w/ ICD, AFib on Xarelto, severe AS s/p TAVR, COPD on 3-4L home O2, CKD4, HTN, HLD, T2DM, and BPH admitted to hospital 02/2025 for HF evaluation. Ongoing CHF exacerbation now off bumex gtt, new DENISHA on CKD likely 2/2 ongoing cardiorenal and possible component of abdominal compartment syndrome given tremendous ascites, now plan for shiley/HD. Hospital course complicated by worsening renal function necessitating HD and episodes of AMS iso of hypercapneia and worsening renal function.  Code stroke called for L face droop and not speaking. Patient evaluated and without facial droop. Patient attempted to strike physician when painful stimulus presented and then glared at physician with continued absence of speech or following commands. Spontaneously moving all extremities thru out. Patient was switched from Xarelto to Eliquis 03/29/2025 as xarelto contraindicated in HD.    PAST MEDICAL & SURGICAL HISTORY:  HTN (hypertension)      COPD (chronic obstructive pulmonary disease)      HLD (hyperlipidemia)      Insulin dependent type 2 diabetes mellitus      Chronic hypoxic respiratory failure, on home oxygen therapy      Stage 4 chronic kidney disease      Chronic combined systolic and diastolic heart failure      Unspecified atrial fibrillation      S/P CABG x 3  cabg3  in 2003      S/P hernia repair  hernia [id number]      S/P implantation of automatic cardioverter/defibrillator (AICD)          FAMILY HISTORY:  FH: HTN (hypertension)        SOCIAL HISTORY:  unable to assess dt mental status    ROS:  unable to assess dt mentation    MEDICATIONS  (STANDING):  allopurinol 100 milliGRAM(s) Oral daily  apixaban 2.5 milliGRAM(s) Oral two times a day  aspirin enteric coated 81 milliGRAM(s) Oral daily  atorvastatin 20 milliGRAM(s) Oral at bedtime  calcium acetate 667 milliGRAM(s) Oral three times a day with meals  chlorhexidine 2% Cloths 1 Application(s) Topical daily  chlorhexidine 4% Liquid 1 Application(s) Topical <User Schedule>  dextrose 5%. 1000 milliLiter(s) (100 mL/Hr) IV Continuous <Continuous>  dextrose 5%. 1000 milliLiter(s) (50 mL/Hr) IV Continuous <Continuous>  dextrose 50% Injectable 25 Gram(s) IV Push once  dextrose 50% Injectable 12.5 Gram(s) IV Push once  dextrose 50% Injectable 25 Gram(s) IV Push once  epoetin lashawn (EPOGEN) Injectable 96845 Unit(s) IV Push <User Schedule>  fluticasone propionate/ salmeterol 250-50 MICROgram(s) Diskus 1 Dose(s) Inhalation two times a day  folic acid 1 milliGRAM(s) Oral daily  glucagon  Injectable 1 milliGRAM(s) IntraMuscular once  insulin glargine Injectable (LANTUS) 18 Unit(s) SubCutaneous at bedtime  insulin lispro (ADMELOG) corrective regimen sliding scale   SubCutaneous three times a day before meals  insulin lispro Injectable (ADMELOG) 3 Unit(s) SubCutaneous three times a day before meals  metoprolol succinate ER 25 milliGRAM(s) Oral daily  pantoprazole    Tablet 40 milliGRAM(s) Oral every 12 hours  polyethylene glycol 3350 17 Gram(s) Oral two times a day  psyllium Powder 1 Packet(s) Oral daily  sodium chloride 0.65% Nasal 1 Spray(s) Both Nostrils two times a day  tamsulosin 0.4 milliGRAM(s) Oral at bedtime  tiotropium 2.5 MICROgram(s) Inhaler 2 Puff(s) Inhalation daily    MEDICATIONS  (PRN):  acetaminophen     Tablet .. 650 milliGRAM(s) Oral every 6 hours PRN Temp greater or equal to 38C (100.4F), Mild Pain (1 - 3)  albuterol/ipratropium for Nebulization 3 milliLiter(s) Nebulizer every 6 hours PRN Shortness of Breath and/or Wheezing  bisacodyl 5 milliGRAM(s) Oral every 12 hours PRN Constipation  dextrose Oral Gel 15 Gram(s) Oral once PRN Blood Glucose LESS THAN 70 milliGRAM(s)/deciliter  dextrose Oral Gel 15 Gram(s) Oral once PRN Blood Glucose LESS THAN 70 milliGRAM(s)/deciliter  fentaNYL    Injectable 25 MICROGram(s) IV Push every 5 minutes PRN Moderate Pain (4 - 6)  melatonin 3 milliGRAM(s) Oral at bedtime PRN Insomnia  ondansetron Injectable 4 milliGRAM(s) IV Push once PRN Nausea and/or Vomiting  ondansetron Injectable 4 milliGRAM(s) IV Push every 6 hours PRN Nausea and/or Vomiting  oxyCODONE    IR 5 milliGRAM(s) Oral once PRN Moderate Pain (4 - 6)  sodium chloride 0.9% lock flush 10 milliLiter(s) IV Push every 1 hour PRN Pre/post blood products, medications, blood draw, and to maintain line patency      Allergies    No Known Allergies    Intolerances        Vital Signs Last 24 Hrs  T(C): 36.3 (30 Mar 2025 04:26), Max: 36.5 (29 Mar 2025 11:54)  T(F): 97.3 (30 Mar 2025 04:26), Max: 97.7 (29 Mar 2025 11:54)  HR: 71 (30 Mar 2025 04:26) (69 - 71)  BP: 102/68 (30 Mar 2025 04:26) (102/68 - 116/72)  BP(mean): --  RR: 18 (30 Mar 2025 04:26) (18 - 18)  SpO2: 100% (30 Mar 2025 04:26) (92% - 100%)    Parameters below as of 30 Mar 2025 04:26  Patient On (Oxygen Delivery Method): nasal cannula  O2 Flow (L/min): 4      PHYSICAL EXAM:  Constitutional: WDWN; NAD  Neurologic:  Mental status: awakens to glaring at physician on painful stimulus. When not stimulated patient falls back asleep within 2-3 minutes. No speech or following commands  Cranial nerves: pupils equally round and reactive to light, visual fields full to blink, no nystagmus, extraocular muscles intact evident by patient following examiner around round, unable to assess sensation, face symmetric on grimace, patient would not open mouth  Motor: spontaneous movement thru out  Sensation: grimaces to pain thru oiut  Coordination: did not participate  Reflexes: 2+ in BUE and patella, achilles 1+, toes down BL  Gait: not assessed given concern for patient safety    NIHSS: 10    Fingerstick Blood Glucose: CAPILLARY BLOOD GLUCOSE      POCT Blood Glucose.: 127 mg/dL (30 Mar 2025 06:01)       LABS:                        11.3   16.95 )-----------( 156      ( 30 Mar 2025 06:20 )             37.7     03-29    134[L]  |  92[L]  |  59[H]  ----------------------------<  145[H]  3.7   |  26  |  5.08[H]    Ca    8.8      29 Mar 2025 11:27  Phos  5.9     03-28  Mg     2.6     03-28            Urinalysis Basic - ( 29 Mar 2025 11:27 )    Color: x / Appearance: x / SG: x / pH: x  Gluc: 145 mg/dL / Ketone: x  / Bili: x / Urobili: x   Blood: x / Protein: x / Nitrite: x   Leuk Esterase: x / RBC: x / WBC x   Sq Epi: x / Non Sq Epi: x / Bacteria: x        RADIOLOGY & ADDITIONAL STUDIES:    < from: CT Brain Stroke Protocol (03.30.25 @ 06:45) >  IMPRESSION:  No acute intracranial hemorrhage, mass effect, or midline shift.      CTA head and neck CTP 03/30/2025 reviewed by me   mod severe stenosis at origin of BL VA and common carotid, atherosclerotic plague in aortic and BL common carotid at bifurcation. no flow limiting stenosis, no LVO. CTP mismatch likely 2/2 chronic white matter disease.    IV-tenecteplase(Y/N):           N                          Reason IV-tenecteplase not given: presentation consistent with TME and not stroke

## 2025-03-31 LAB
ALBUMIN SERPL ELPH-MCNC: 2.9 G/DL — LOW (ref 3.3–5)
ALP SERPL-CCNC: 200 U/L — HIGH (ref 40–120)
ALT FLD-CCNC: <5 U/L — LOW (ref 10–45)
ANION GAP SERPL CALC-SCNC: 16 MMOL/L — SIGNIFICANT CHANGE UP (ref 5–17)
AST SERPL-CCNC: 29 U/L — SIGNIFICANT CHANGE UP (ref 10–40)
BILIRUB SERPL-MCNC: 0.6 MG/DL — SIGNIFICANT CHANGE UP (ref 0.2–1.2)
BUN SERPL-MCNC: 73 MG/DL — HIGH (ref 7–23)
CALCIUM SERPL-MCNC: 8.9 MG/DL — SIGNIFICANT CHANGE UP (ref 8.4–10.5)
CHLORIDE SERPL-SCNC: 91 MMOL/L — LOW (ref 96–108)
CO2 SERPL-SCNC: 25 MMOL/L — SIGNIFICANT CHANGE UP (ref 22–31)
CREAT SERPL-MCNC: 6.17 MG/DL — HIGH (ref 0.5–1.3)
CULTURE RESULTS: SIGNIFICANT CHANGE UP
CULTURE RESULTS: SIGNIFICANT CHANGE UP
EGFR: 9 ML/MIN/1.73M2 — LOW
EGFR: 9 ML/MIN/1.73M2 — LOW
GLUCOSE BLDC GLUCOMTR-MCNC: 104 MG/DL — HIGH (ref 70–99)
GLUCOSE BLDC GLUCOMTR-MCNC: 151 MG/DL — HIGH (ref 70–99)
GLUCOSE BLDC GLUCOMTR-MCNC: 176 MG/DL — HIGH (ref 70–99)
GLUCOSE BLDC GLUCOMTR-MCNC: 98 MG/DL — SIGNIFICANT CHANGE UP (ref 70–99)
GLUCOSE SERPL-MCNC: 92 MG/DL — SIGNIFICANT CHANGE UP (ref 70–99)
HCT VFR BLD CALC: 33 % — LOW (ref 39–50)
HGB BLD-MCNC: 9.7 G/DL — LOW (ref 13–17)
MAGNESIUM SERPL-MCNC: 2.4 MG/DL — SIGNIFICANT CHANGE UP (ref 1.6–2.6)
MCHC RBC-ENTMCNC: 26 PG — LOW (ref 27–34)
MCHC RBC-ENTMCNC: 29.4 G/DL — LOW (ref 32–36)
MCV RBC AUTO: 88.5 FL — SIGNIFICANT CHANGE UP (ref 80–100)
NRBC BLD AUTO-RTO: 2 /100 WBCS — HIGH (ref 0–0)
PHOSPHATE SERPL-MCNC: 5.1 MG/DL — HIGH (ref 2.5–4.5)
PLATELET # BLD AUTO: 128 K/UL — LOW (ref 150–400)
POTASSIUM SERPL-MCNC: 4.2 MMOL/L — SIGNIFICANT CHANGE UP (ref 3.5–5.3)
POTASSIUM SERPL-SCNC: 4.2 MMOL/L — SIGNIFICANT CHANGE UP (ref 3.5–5.3)
PROT SERPL-MCNC: 6.5 G/DL — SIGNIFICANT CHANGE UP (ref 6–8.3)
RBC # BLD: 3.73 M/UL — LOW (ref 4.2–5.8)
RBC # FLD: 17.5 % — HIGH (ref 10.3–14.5)
SODIUM SERPL-SCNC: 132 MMOL/L — LOW (ref 135–145)
SPECIMEN SOURCE: SIGNIFICANT CHANGE UP
SPECIMEN SOURCE: SIGNIFICANT CHANGE UP
T PALLIDUM AB TITR SER: NEGATIVE — SIGNIFICANT CHANGE UP
WBC # BLD: 12.52 K/UL — HIGH (ref 3.8–10.5)
WBC # FLD AUTO: 12.52 K/UL — HIGH (ref 3.8–10.5)

## 2025-03-31 PROCEDURE — 99233 SBSQ HOSP IP/OBS HIGH 50: CPT

## 2025-03-31 PROCEDURE — 99232 SBSQ HOSP IP/OBS MODERATE 35: CPT

## 2025-03-31 RX ADMIN — POLYETHYLENE GLYCOL 3350 17 GRAM(S): 17 POWDER, FOR SOLUTION ORAL at 17:49

## 2025-03-31 RX ADMIN — Medication 40 MILLIGRAM(S): at 06:32

## 2025-03-31 RX ADMIN — Medication 1 APPLICATION(S): at 06:46

## 2025-03-31 RX ADMIN — INSULIN LISPRO 3 UNIT(S): 100 INJECTION, SOLUTION INTRAVENOUS; SUBCUTANEOUS at 17:47

## 2025-03-31 RX ADMIN — ATORVASTATIN CALCIUM 20 MILLIGRAM(S): 80 TABLET, FILM COATED ORAL at 21:18

## 2025-03-31 RX ADMIN — Medication 81 MILLIGRAM(S): at 13:19

## 2025-03-31 RX ADMIN — Medication 1 DOSE(S): at 17:50

## 2025-03-31 RX ADMIN — BUMETANIDE 132 MILLIGRAM(S): 1 TABLET ORAL at 06:35

## 2025-03-31 RX ADMIN — FOLIC ACID 1 MILLIGRAM(S): 1 TABLET ORAL at 13:19

## 2025-03-31 RX ADMIN — Medication 1 SPRAY(S): at 17:50

## 2025-03-31 RX ADMIN — INSULIN GLARGINE-YFGN 18 UNIT(S): 100 INJECTION, SOLUTION SUBCUTANEOUS at 21:17

## 2025-03-31 RX ADMIN — Medication 1 PACKET(S): at 21:17

## 2025-03-31 RX ADMIN — Medication 1 DOSE(S): at 13:19

## 2025-03-31 RX ADMIN — APIXABAN 2.5 MILLIGRAM(S): 2.5 TABLET, FILM COATED ORAL at 17:49

## 2025-03-31 RX ADMIN — Medication 100 MILLIGRAM(S): at 13:20

## 2025-03-31 RX ADMIN — Medication 667 MILLIGRAM(S): at 17:49

## 2025-03-31 RX ADMIN — INSULIN LISPRO 2: 100 INJECTION, SOLUTION INTRAVENOUS; SUBCUTANEOUS at 17:47

## 2025-03-31 RX ADMIN — TAMSULOSIN HYDROCHLORIDE 0.4 MILLIGRAM(S): 0.4 CAPSULE ORAL at 21:17

## 2025-03-31 RX ADMIN — POLYETHYLENE GLYCOL 3350 17 GRAM(S): 17 POWDER, FOR SOLUTION ORAL at 06:32

## 2025-03-31 RX ADMIN — TIOTROPIUM BROMIDE INHALATION SPRAY 2 PUFF(S): 3.12 SPRAY, METERED RESPIRATORY (INHALATION) at 17:50

## 2025-03-31 RX ADMIN — Medication 40 MILLIGRAM(S): at 17:49

## 2025-03-31 RX ADMIN — Medication 667 MILLIGRAM(S): at 13:20

## 2025-03-31 RX ADMIN — INSULIN LISPRO 3 UNIT(S): 100 INJECTION, SOLUTION INTRAVENOUS; SUBCUTANEOUS at 13:20

## 2025-03-31 RX ADMIN — Medication 1 APPLICATION(S): at 13:31

## 2025-03-31 RX ADMIN — APIXABAN 2.5 MILLIGRAM(S): 2.5 TABLET, FILM COATED ORAL at 06:33

## 2025-03-31 RX ADMIN — METOPROLOL SUCCINATE 25 MILLIGRAM(S): 50 TABLET, EXTENDED RELEASE ORAL at 06:33

## 2025-03-31 NOTE — PROGRESS NOTE ADULT - PROBLEM SELECTOR PLAN 3
- EF 30% with severe tricuspid regurg and PASP 55mmHG on 2/7/24. Dry wt is 243 lbs.   - s/p bumex 4mg iv TID   -will give bumex today as he wont be dialyzed today and has evidence of pulmonary edema.   - GDMT:  Metoprolol succinate 50mg day. Holding entresto and aldactone for DENISHA. Not on SGLT2.  - Hydralazine/Nitrate: hydral 50mg TID. Isordil 20mg TID   - Cardiology following, patient declined RHC and cardiomems  - per GOC with wife; pt dnr/dni but they wish to keep ICD active  - remove ro

## 2025-03-31 NOTE — PROGRESS NOTE ADULT - SUBJECTIVE AND OBJECTIVE BOX
NEUROLOGY FOLLOW-UP CONSULT NOTE    RFC: L facial droop and aphasia    Interval history: No acute neurologic events overnight. Patient seen and examined by neurology this afternoon, with wife present at bedside corroborating key elements of patient history. Per patient's wife, he is back to his neurological baseline with the exception of extreme lethargy after HD sessions. Today, patient is very lethargic as he underwent HD session earlier, requires frequent stimulation to participate with exam, but otherwise producing verbal output and following commands.     Meds:  MEDICATIONS  (STANDING):  allopurinol 100 milliGRAM(s) Oral daily  apixaban 2.5 milliGRAM(s) Oral two times a day  aspirin enteric coated 81 milliGRAM(s) Oral daily  atorvastatin 20 milliGRAM(s) Oral at bedtime  calcium acetate 667 milliGRAM(s) Oral three times a day with meals  chlorhexidine 2% Cloths 1 Application(s) Topical daily  chlorhexidine 4% Liquid 1 Application(s) Topical <User Schedule>  dextrose 5%. 1000 milliLiter(s) (50 mL/Hr) IV Continuous <Continuous>  dextrose 5%. 1000 milliLiter(s) (100 mL/Hr) IV Continuous <Continuous>  dextrose 50% Injectable 25 Gram(s) IV Push once  dextrose 50% Injectable 12.5 Gram(s) IV Push once  dextrose 50% Injectable 25 Gram(s) IV Push once  epoetin lashawn (EPOGEN) Injectable 75061 Unit(s) IV Push <User Schedule>  fluticasone propionate/ salmeterol 250-50 MICROgram(s) Diskus 1 Dose(s) Inhalation two times a day  folic acid 1 milliGRAM(s) Oral daily  glucagon  Injectable 1 milliGRAM(s) IntraMuscular once  insulin glargine Injectable (LANTUS) 18 Unit(s) SubCutaneous at bedtime  insulin lispro (ADMELOG) corrective regimen sliding scale   SubCutaneous three times a day before meals  insulin lispro Injectable (ADMELOG) 3 Unit(s) SubCutaneous three times a day before meals  metoprolol succinate ER 25 milliGRAM(s) Oral daily  pantoprazole    Tablet 40 milliGRAM(s) Oral every 12 hours  polyethylene glycol 3350 17 Gram(s) Oral two times a day  psyllium Powder 1 Packet(s) Oral daily  sodium chloride 0.65% Nasal 1 Spray(s) Both Nostrils two times a day  tamsulosin 0.4 milliGRAM(s) Oral at bedtime  tiotropium 2.5 MICROgram(s) Inhaler 2 Puff(s) Inhalation daily    MEDICATIONS  (PRN):  acetaminophen     Tablet .. 650 milliGRAM(s) Oral every 6 hours PRN Temp greater or equal to 38C (100.4F), Mild Pain (1 - 3)  albuterol/ipratropium for Nebulization 3 milliLiter(s) Nebulizer every 6 hours PRN Shortness of Breath and/or Wheezing  bisacodyl 5 milliGRAM(s) Oral every 12 hours PRN Constipation  dextrose Oral Gel 15 Gram(s) Oral once PRN Blood Glucose LESS THAN 70 milliGRAM(s)/deciliter  dextrose Oral Gel 15 Gram(s) Oral once PRN Blood Glucose LESS THAN 70 milliGRAM(s)/deciliter  fentaNYL    Injectable 25 MICROGram(s) IV Push every 5 minutes PRN Moderate Pain (4 - 6)  melatonin 3 milliGRAM(s) Oral at bedtime PRN Insomnia  ondansetron Injectable 4 milliGRAM(s) IV Push once PRN Nausea and/or Vomiting  ondansetron Injectable 4 milliGRAM(s) IV Push every 6 hours PRN Nausea and/or Vomiting  oxyCODONE    IR 5 milliGRAM(s) Oral once PRN Moderate Pain (4 - 6)  sodium chloride 0.9% lock flush 10 milliLiter(s) IV Push every 1 hour PRN Pre/post blood products, medications, blood draw, and to maintain line patency      PMHx/PSHx/FHx/SHx:  Heart failure    DNI: Trial NIV    DM (diabetes mellitus), type 2    High cholesterol    CHF (congestive heart failure)    Pneumonia    COPD (chronic obstructive pulmonary disease)    Pacemaker    AICD (automatic cardioverter/defibrillator) present    Chronic renal insufficiency    Insulin dependent type 2 diabetes mellitus    Chronic hypoxic respiratory failure, on home oxygen therapy    Stage 4 chronic kidney disease    Chronic combined systolic and diastolic heart failure    Acute on chronic heart failure with reduced ejection fraction (HFrEF, <= 40%)    CAD (coronary artery disease)    Chronic atrial fibrillation    COPD, severe    Chronic hypoxic respiratory failure    HTN (hypertension)    HLD (hyperlipidemia)    T2DM (type 2 diabetes mellitus)    BPH (benign prostatic hyperplasia)    Stage 4 chronic kidney disease    DVT of upper extremity (deep vein thrombosis)    Acute kidney injury superimposed on CKD    Epistaxis    Melena    Anemia due to acute blood loss    Leukocytosis    Lethargy    HEART FAILURE, UNSPECIFIED      Allergies:  No Known Allergies      ROS: All systems negative except as documented in Interval history    O:  T(C): 36.4 (03-31-25 @ 12:00), Max: 36.7 (03-31-25 @ 04:53)  HR: 74 (03-31-25 @ 12:00) (70 - 82)  BP: 104/63 (03-31-25 @ 12:00) (104/63 - 122/78)  RR: 18 (03-31-25 @ 12:00) (17 - 19)  SpO2: 95% (03-31-25 @ 12:00) (93% - 97%)    Focused neurologic exam:  MS - Lethargic, arouses to voice, oriented x2 (reports place as hospital but does not know which, date as March 2025 but does not know day of the week or date of the month), speech dysarthric d/t edentulous status but otherwise fluent, rep/naming intact, follows simple commands with repeated encouragement  CN - PERRLA, EOMI, VFF as BTT, face sens/str/hearing WNL b/l, unable to assess tongue/palate as pt would not open mouth, trap at least 3/5 and symmetric   Motor - Increased bulk, BUE's at least 4-/5 and symmetric, BLE's at least 3/5 and symmetric  Sens - LT intact all  DTR's - neutral b/l plantar response  Coord - FtN intact b/l w/ b/l intention tremor  Gait and station - Bedbound    Pertinent labs/studies:    LABS:                         9.7    12.52 )-----------( 128      ( 31 Mar 2025 06:44 )             33.0     03-31    132[L]  |  91[L]  |  73[H]  ----------------------------<  92  4.2   |  25  |  6.17[H]    Ca    8.9      31 Mar 2025 06:44  Phos  5.1     03-31  Mg     2.4     03-31    TPro  6.5  /  Alb  2.9[L]  /  TBili  0.6  /  DBili  x   /  AST  29  /  ALT  <5[L]  /  AlkPhos  200[H]  03-31    PT/INR - ( 30 Mar 2025 06:20 )   PT: 13.0 sec;   INR: 1.13 ratio         PTT - ( 30 Mar 2025 06:20 )  PTT:30.4 sec  Urinalysis Basic - ( 31 Mar 2025 06:44 )    Color: x / Appearance: x / SG: x / pH: x  Gluc: 92 mg/dL / Ketone: x  / Bili: x / Urobili: x   Blood: x / Protein: x / Nitrite: x   Leuk Esterase: x / RBC: x / WBC x   Sq Epi: x / Non Sq Epi: x / Bacteria: x      GFR dec 9, ammonia WNL  B12 WNL, folate >20.0  TSH WNL        RADIOLOGY, EKG & ADDITIONAL TESTS: Reviewed.     < from: CT Angio Brain Stroke Protocol  w/ IV Cont (03.30.25 @ 06:46) >    IMPRESSION:    CT PERFUSION:  Motion degraded exam; within these constraints, no CT perfusion evidence   ofacute core infarct. MRI brain recommended if symptoms persist.    Small volume (5 mL) questionable territory at risk within the right   temporoparietal subcortical white matter, which may alternatively   represent slow flow phenomenon versus imaging artifact.    CTA NECK:  Calcifications within the left vertebral artery causing up to mild   stenosis.    No evidence of hemodynamically flow-limiting stenosis within the   bilateral cervical carotid arteries.    CTA HEAD:  Calcifications causing mild stenosis of the left supraclinoid ICA and   moderate stenosis of the right supraclinoid ICA.    No proximal large vessel occlusion or vascular abnormality identified.      --- End of Report ---    < end of copied text >      < from: CT Brain Stroke Protocol (03.30.25 @ 06:45) >    IMPRESSION:  No acute intracranial hemorrhage, mass effect, or midline shift.    Findings were discussed with Dr. Mederos on 3/30/2025 at 6:37 AM by Dr. Esqueda with read back confirmation.    --- End of Report ---    < end of copied text >

## 2025-03-31 NOTE — PROGRESS NOTE ADULT - PROBLEM SELECTOR PLAN 1
- Continue monitoring Cr and urine output.   - now on HD, pulm edema on CXR which is likely contributing to his hypercarbia.   - c/w bipap  - s/p tunneled HD catheter on 3/28  - renal follow up

## 2025-03-31 NOTE — PROGRESS NOTE ADULT - ASSESSMENT
78 y/o male w/ PMHx CAD s/p CABG, HF (combined HFrEF [EF 25%] and HFpEF [G3DD]) w/ ICD, AFib on Xarelto, severe AS s/p TAVR, COPD on 3-4L home O2, CKD4, HTN, HLD, T2DM, and BPH who presents as a transfer from [facility] for HF evaluation. Ongoing CHF exacerbation now off bumex gtt, new DENISHA on CKD likely 2/2 ongoing cardiorenal and possible component of abdominal compartment syndrome given tremendous ascites, now s/p tunnelled HD catheter.  RRT/Code Stroke on 3/30/25 for lethargy and facial droop, CT negative, likely in the setting of hypercarbia, placed on bipap with improvement.

## 2025-03-31 NOTE — PROGRESS NOTE ADULT - ASSESSMENT
ASSESSMENT: 78 y/o M w/ PMHx CAD s/p CABG, HF (combined HFrEF [EF 25%] and HFpEF [G3DD]) w/ ICD, AFib on Xarelto, severe AS s/p TAVR, COPD on 3-4L home O2, CKD4, HTN, HLD, T2DM, and BPH admitted to hospital 02/2025 for HF evaluation. Hospital course complicated by worsening renal function necessitating HD and episodes of AMS iso of hypercapneia and worsening renal function. Code stroke called for L face droop and not speaking. Patient evaluated and without facial droop. CTH, personally reviewed by me, without evidence of acute intracranial pathology. 3/31- per pt's wife at beside he is back to his neurologic baseline, pt is lethargic but following commands, oriented x2, mental status improved from prior, exam without focal deficits.     IMPRESSION: Now improved; aphasia and inability to follow commands, etiology likely toxic metabolic encephalopathy 2/2 multiple toxic metabolic derangements such as elevated WBC, renal failure, and hypercapnic respiratory failure.     RECOMMENDATIONS:  -EEG and MRI brain NOT recommended at this time as mental status improving  -Continue to address above medical issues, as you are doing  -No further inpatient neurologic workup  -Rest of care per primary team    Neurology will sign off at this time.  Thank you [id number]

## 2025-03-31 NOTE — PROGRESS NOTE ADULT - SUBJECTIVE AND OBJECTIVE BOX
Seen on HD, on NC O2, w/end events noted    Vital Signs Last 24 Hrs  T(C): 36.7 (03-31-25 @ 08:20), Max: 36.7 (03-31-25 @ 04:53)  T(F): 98.1 (03-31-25 @ 08:20), Max: 98.1 (03-31-25 @ 08:20)  HR: 71 (03-31-25 @ 08:20) (70 - 82)  BP: 116/60 (03-31-25 @ 08:20) (105/55 - 122/78)  RR: 17 (03-31-25 @ 08:20) (17 - 19)  SpO2: 96% (03-31-25 @ 09:12) (93% - 97%)    s1s2  b/l air entry  soft, ascites   edema                                                                                                                                                                             9.7    12.52 )-----------( 128      ( 31 Mar 2025 06:44 )             33.0     31 Mar 2025 06:44    132    |  91     |  73     ----------------------------<  92     4.2     |  25     |  6.17     Ca    8.9        31 Mar 2025 06:44  Phos  5.1       31 Mar 2025 06:44  Mg     2.4       31 Mar 2025 06:44    TPro  6.5    /  Alb  2.9    /  TBili  0.6    /  DBili  x      /  AST  29     /  ALT  <5     /  AlkPhos  200    31 Mar 2025 06:44    LIVER FUNCTIONS - ( 31 Mar 2025 06:44 )  Alb: 2.9 g/dL / Pro: 6.5 g/dL / ALK PHOS: 200 U/L / ALT: <5 U/L / AST: 29 U/L / GGT: x           PT/INR - ( 30 Mar 2025 06:20 )   PT: 13.0 sec;   INR: 1.13 ratio      acetaminophen     Tablet .. 650 milliGRAM(s) Oral every 6 hours PRN  albuterol/ipratropium for Nebulization 3 milliLiter(s) Nebulizer every 6 hours PRN  allopurinol 100 milliGRAM(s) Oral daily  apixaban 2.5 milliGRAM(s) Oral two times a day  aspirin enteric coated 81 milliGRAM(s) Oral daily  atorvastatin 20 milliGRAM(s) Oral at bedtime  bisacodyl 5 milliGRAM(s) Oral every 12 hours PRN  calcium acetate 667 milliGRAM(s) Oral three times a day with meals  chlorhexidine 2% Cloths 1 Application(s) Topical daily  chlorhexidine 4% Liquid 1 Application(s) Topical <User Schedule>  dextrose 5%. 1000 milliLiter(s) IV Continuous <Continuous>  dextrose 5%. 1000 milliLiter(s) IV Continuous <Continuous>  dextrose 50% Injectable 25 Gram(s) IV Push once  dextrose 50% Injectable 12.5 Gram(s) IV Push once  dextrose 50% Injectable 25 Gram(s) IV Push once  dextrose Oral Gel 15 Gram(s) Oral once PRN  dextrose Oral Gel 15 Gram(s) Oral once PRN  epoetin lashawn (EPOGEN) Injectable 44581 Unit(s) IV Push <User Schedule>  fentaNYL    Injectable 25 MICROGram(s) IV Push every 5 minutes PRN  fluticasone propionate/ salmeterol 250-50 MICROgram(s) Diskus 1 Dose(s) Inhalation two times a day  folic acid 1 milliGRAM(s) Oral daily  glucagon  Injectable 1 milliGRAM(s) IntraMuscular once  insulin glargine Injectable (LANTUS) 18 Unit(s) SubCutaneous at bedtime  insulin lispro (ADMELOG) corrective regimen sliding scale   SubCutaneous three times a day before meals  insulin lispro Injectable (ADMELOG) 3 Unit(s) SubCutaneous three times a day before meals  melatonin 3 milliGRAM(s) Oral at bedtime PRN  metoprolol succinate ER 25 milliGRAM(s) Oral daily  ondansetron Injectable 4 milliGRAM(s) IV Push once PRN  ondansetron Injectable 4 milliGRAM(s) IV Push every 6 hours PRN  oxyCODONE    IR 5 milliGRAM(s) Oral once PRN  pantoprazole    Tablet 40 milliGRAM(s) Oral every 12 hours  polyethylene glycol 3350 17 Gram(s) Oral two times a day  psyllium Powder 1 Packet(s) Oral daily  sodium chloride 0.65% Nasal 1 Spray(s) Both Nostrils two times a day  sodium chloride 0.9% lock flush 10 milliLiter(s) IV Push every 1 hour PRN  tamsulosin 0.4 milliGRAM(s) Oral at bedtime  tiotropium 2.5 MICROgram(s) Inhaler 2 Puff(s) Inhalation daily    A/P:    COPD, on home O2  CM, EF ~ 30%, severe TR  Adm to [facility] 1/10/25 w/fluid overload   Tx to [facility] 2/5/25 for HF eval    S/p LVP 3/14 w/4.5L fluid removal, s/p SPA   Dropped Hgb to 5.3 on 3/15, s/p PRBCs   Hemodynamic/cardio-renal DENISHA/CKD   Started on HD 3/24 via temp HD cath  S/p perm cath 3/28  TMP 2kg as tolerates w/HD   W/end events noted, lethargic, hypercapnic, improved w/BIPAP, now back on NC O2  Neuro eval noted   Pt will need arrangements for [facility] w/HD when ready  Next HD on Wednesday     [phone]

## 2025-03-31 NOTE — PROGRESS NOTE ADULT - SUBJECTIVE AND OBJECTIVE BOX
Patient is a 79y old  Male who presents with a chief complaint of HF eval (31 Mar 2025 11:20)      SUBJECTIVE / OVERNIGHT EVENTS: pt seen in , tonny mejia     MEDICATIONS  (STANDING):  allopurinol 100 milliGRAM(s) Oral daily  apixaban 2.5 milliGRAM(s) Oral two times a day  aspirin enteric coated 81 milliGRAM(s) Oral daily  atorvastatin 20 milliGRAM(s) Oral at bedtime  calcium acetate 667 milliGRAM(s) Oral three times a day with meals  chlorhexidine 2% Cloths 1 Application(s) Topical daily  chlorhexidine 4% Liquid 1 Application(s) Topical <User Schedule>  dextrose 5%. 1000 milliLiter(s) (100 mL/Hr) IV Continuous <Continuous>  dextrose 5%. 1000 milliLiter(s) (50 mL/Hr) IV Continuous <Continuous>  dextrose 50% Injectable 25 Gram(s) IV Push once  dextrose 50% Injectable 12.5 Gram(s) IV Push once  dextrose 50% Injectable 25 Gram(s) IV Push once  epoetin lashawn (EPOGEN) Injectable 51796 Unit(s) IV Push <User Schedule>  fluticasone propionate/ salmeterol 250-50 MICROgram(s) Diskus 1 Dose(s) Inhalation two times a day  folic acid 1 milliGRAM(s) Oral daily  glucagon  Injectable 1 milliGRAM(s) IntraMuscular once  insulin glargine Injectable (LANTUS) 18 Unit(s) SubCutaneous at bedtime  insulin lispro (ADMELOG) corrective regimen sliding scale   SubCutaneous three times a day before meals  insulin lispro Injectable (ADMELOG) 3 Unit(s) SubCutaneous three times a day before meals  metoprolol succinate ER 25 milliGRAM(s) Oral daily  pantoprazole    Tablet 40 milliGRAM(s) Oral every 12 hours  polyethylene glycol 3350 17 Gram(s) Oral two times a day  psyllium Powder 1 Packet(s) Oral daily  sodium chloride 0.65% Nasal 1 Spray(s) Both Nostrils two times a day  tamsulosin 0.4 milliGRAM(s) Oral at bedtime  tiotropium 2.5 MICROgram(s) Inhaler 2 Puff(s) Inhalation daily    MEDICATIONS  (PRN):  acetaminophen     Tablet .. 650 milliGRAM(s) Oral every 6 hours PRN Temp greater or equal to 38C (100.4F), Mild Pain (1 - 3)  albuterol/ipratropium for Nebulization 3 milliLiter(s) Nebulizer every 6 hours PRN Shortness of Breath and/or Wheezing  bisacodyl 5 milliGRAM(s) Oral every 12 hours PRN Constipation  dextrose Oral Gel 15 Gram(s) Oral once PRN Blood Glucose LESS THAN 70 milliGRAM(s)/deciliter  dextrose Oral Gel 15 Gram(s) Oral once PRN Blood Glucose LESS THAN 70 milliGRAM(s)/deciliter  fentaNYL    Injectable 25 MICROGram(s) IV Push every 5 minutes PRN Moderate Pain (4 - 6)  melatonin 3 milliGRAM(s) Oral at bedtime PRN Insomnia  ondansetron Injectable 4 milliGRAM(s) IV Push every 6 hours PRN Nausea and/or Vomiting  ondansetron Injectable 4 milliGRAM(s) IV Push once PRN Nausea and/or Vomiting  oxyCODONE    IR 5 milliGRAM(s) Oral once PRN Moderate Pain (4 - 6)  sodium chloride 0.9% lock flush 10 milliLiter(s) IV Push every 1 hour PRN Pre/post blood products, medications, blood draw, and to maintain line patency        CAPILLARY BLOOD GLUCOSE      POCT Blood Glucose.: 98 mg/dL (31 Mar 2025 07:37)  POCT Blood Glucose.: 117 mg/dL (30 Mar 2025 21:30)  POCT Blood Glucose.: 113 mg/dL (30 Mar 2025 17:23)    I&O's Summary    30 Mar 2025 07:01  -  31 Mar 2025 07:00  --------------------------------------------------------  IN: 270 mL / OUT: 150 mL / NET: 120 mL        PHYSICAL EXAM:  CONSTITUTIONAL: NAD  EYES: PERRLA; conjunctiva and sclera clear  NECK: Supple  RESPIRATORY: Normal respiratory effort; CTAB  CARDIOVASCULAR: RRR, S1S2  ABDOMEN: distended  MUSCLOSKELETAL:  anasarca  PSYCH: A+O x 2-3,                        9.7    12.52 )-----------( 128      ( 31 Mar 2025 06:44 )             33.0     03-31    132[L]  |  91[L]  |  73[H]  ----------------------------<  92  4.2   |  25  |  6.17[H]    Ca    8.9      31 Mar 2025 06:44  Phos  5.1     03-31  Mg     2.4     03-31    TPro  6.5  /  Alb  2.9[L]  /  TBili  0.6  /  DBili  x   /  AST  29  /  ALT  <5[L]  /  AlkPhos  200[H]  03-31    PT/INR - ( 30 Mar 2025 06:20 )   PT: 13.0 sec;   INR: 1.13 ratio         PTT - ( 30 Mar 2025 06:20 )  PTT:30.4 sec      Urinalysis Basic - ( 31 Mar 2025 06:44 )    Color: x / Appearance: x / SG: x / pH: x  Gluc: 92 mg/dL / Ketone: x  / Bili: x / Urobili: x   Blood: x / Protein: x / Nitrite: x   Leuk Esterase: x / RBC: x / WBC x   Sq Epi: x / Non Sq Epi: x / Bacteria: x        RADIOLOGY & ADDITIONAL TESTS:    Imaging Personally Reviewed:    Consultant(s) Notes Reviewed:      Care Discussed with Consultants/Other Providers:

## 2025-04-01 LAB
ALBUMIN SERPL ELPH-MCNC: 3.2 G/DL — LOW (ref 3.3–5)
ALP SERPL-CCNC: 241 U/L — HIGH (ref 40–120)
ALT FLD-CCNC: 7 U/L — LOW (ref 10–45)
ANION GAP SERPL CALC-SCNC: 17 MMOL/L — SIGNIFICANT CHANGE UP (ref 5–17)
AST SERPL-CCNC: 54 U/L — HIGH (ref 10–40)
BILIRUB SERPL-MCNC: 0.8 MG/DL — SIGNIFICANT CHANGE UP (ref 0.2–1.2)
BUN SERPL-MCNC: 55 MG/DL — HIGH (ref 7–23)
CALCIUM SERPL-MCNC: 8.4 MG/DL — SIGNIFICANT CHANGE UP (ref 8.4–10.5)
CHLORIDE SERPL-SCNC: 92 MMOL/L — LOW (ref 96–108)
CO2 SERPL-SCNC: 26 MMOL/L — SIGNIFICANT CHANGE UP (ref 22–31)
CREAT SERPL-MCNC: 5.2 MG/DL — HIGH (ref 0.5–1.3)
EGFR: 10 ML/MIN/1.73M2 — LOW
EGFR: 10 ML/MIN/1.73M2 — LOW
GLUCOSE BLDC GLUCOMTR-MCNC: 144 MG/DL — HIGH (ref 70–99)
GLUCOSE BLDC GLUCOMTR-MCNC: 163 MG/DL — HIGH (ref 70–99)
GLUCOSE BLDC GLUCOMTR-MCNC: 216 MG/DL — HIGH (ref 70–99)
GLUCOSE BLDC GLUCOMTR-MCNC: 221 MG/DL — HIGH (ref 70–99)
GLUCOSE SERPL-MCNC: 215 MG/DL — HIGH (ref 70–99)
HCT VFR BLD CALC: 35.1 % — LOW (ref 39–50)
HGB BLD-MCNC: 10.3 G/DL — LOW (ref 13–17)
MAGNESIUM SERPL-MCNC: 2.5 MG/DL — SIGNIFICANT CHANGE UP (ref 1.6–2.6)
MCHC RBC-ENTMCNC: 26.2 PG — LOW (ref 27–34)
MCHC RBC-ENTMCNC: 29.3 G/DL — LOW (ref 32–36)
MCV RBC AUTO: 89.3 FL — SIGNIFICANT CHANGE UP (ref 80–100)
NRBC BLD AUTO-RTO: 2 /100 WBCS — HIGH (ref 0–0)
PHOSPHATE SERPL-MCNC: 4.2 MG/DL — SIGNIFICANT CHANGE UP (ref 2.5–4.5)
PLATELET # BLD AUTO: 132 K/UL — LOW (ref 150–400)
POTASSIUM SERPL-MCNC: 4.4 MMOL/L — SIGNIFICANT CHANGE UP (ref 3.5–5.3)
POTASSIUM SERPL-SCNC: 4.4 MMOL/L — SIGNIFICANT CHANGE UP (ref 3.5–5.3)
PROT SERPL-MCNC: 7.1 G/DL — SIGNIFICANT CHANGE UP (ref 6–8.3)
RBC # BLD: 3.93 M/UL — LOW (ref 4.2–5.8)
RBC # FLD: 18.2 % — HIGH (ref 10.3–14.5)
SODIUM SERPL-SCNC: 135 MMOL/L — SIGNIFICANT CHANGE UP (ref 135–145)
WBC # BLD: 12.69 K/UL — HIGH (ref 3.8–10.5)
WBC # FLD AUTO: 12.69 K/UL — HIGH (ref 3.8–10.5)

## 2025-04-01 PROCEDURE — 99233 SBSQ HOSP IP/OBS HIGH 50: CPT

## 2025-04-01 RX ADMIN — Medication 1 SPRAY(S): at 17:47

## 2025-04-01 RX ADMIN — INSULIN LISPRO 3 UNIT(S): 100 INJECTION, SOLUTION INTRAVENOUS; SUBCUTANEOUS at 13:11

## 2025-04-01 RX ADMIN — INSULIN LISPRO 3 UNIT(S): 100 INJECTION, SOLUTION INTRAVENOUS; SUBCUTANEOUS at 17:46

## 2025-04-01 RX ADMIN — Medication 40 MILLIGRAM(S): at 05:13

## 2025-04-01 RX ADMIN — ATORVASTATIN CALCIUM 20 MILLIGRAM(S): 80 TABLET, FILM COATED ORAL at 22:16

## 2025-04-01 RX ADMIN — TAMSULOSIN HYDROCHLORIDE 0.4 MILLIGRAM(S): 0.4 CAPSULE ORAL at 22:17

## 2025-04-01 RX ADMIN — Medication 667 MILLIGRAM(S): at 09:10

## 2025-04-01 RX ADMIN — POLYETHYLENE GLYCOL 3350 17 GRAM(S): 17 POWDER, FOR SOLUTION ORAL at 05:14

## 2025-04-01 RX ADMIN — Medication 1 DOSE(S): at 05:13

## 2025-04-01 RX ADMIN — Medication 100 MILLIGRAM(S): at 12:20

## 2025-04-01 RX ADMIN — INSULIN LISPRO 4: 100 INJECTION, SOLUTION INTRAVENOUS; SUBCUTANEOUS at 09:09

## 2025-04-01 RX ADMIN — Medication 650 MILLIGRAM(S): at 12:18

## 2025-04-01 RX ADMIN — INSULIN GLARGINE-YFGN 18 UNIT(S): 100 INJECTION, SOLUTION SUBCUTANEOUS at 22:15

## 2025-04-01 RX ADMIN — Medication 1 PACKET(S): at 22:16

## 2025-04-01 RX ADMIN — Medication 667 MILLIGRAM(S): at 12:20

## 2025-04-01 RX ADMIN — APIXABAN 2.5 MILLIGRAM(S): 2.5 TABLET, FILM COATED ORAL at 17:47

## 2025-04-01 RX ADMIN — Medication 81 MILLIGRAM(S): at 12:20

## 2025-04-01 RX ADMIN — FOLIC ACID 1 MILLIGRAM(S): 1 TABLET ORAL at 12:20

## 2025-04-01 RX ADMIN — Medication 40 MILLIGRAM(S): at 17:47

## 2025-04-01 RX ADMIN — Medication 1 SPRAY(S): at 05:13

## 2025-04-01 RX ADMIN — METOPROLOL SUCCINATE 25 MILLIGRAM(S): 50 TABLET, EXTENDED RELEASE ORAL at 09:48

## 2025-04-01 RX ADMIN — TIOTROPIUM BROMIDE INHALATION SPRAY 2 PUFF(S): 3.12 SPRAY, METERED RESPIRATORY (INHALATION) at 17:45

## 2025-04-01 RX ADMIN — APIXABAN 2.5 MILLIGRAM(S): 2.5 TABLET, FILM COATED ORAL at 05:13

## 2025-04-01 RX ADMIN — Medication 667 MILLIGRAM(S): at 17:45

## 2025-04-01 RX ADMIN — INSULIN LISPRO 4: 100 INJECTION, SOLUTION INTRAVENOUS; SUBCUTANEOUS at 13:10

## 2025-04-01 RX ADMIN — Medication 1 DOSE(S): at 17:47

## 2025-04-01 RX ADMIN — INSULIN LISPRO 3 UNIT(S): 100 INJECTION, SOLUTION INTRAVENOUS; SUBCUTANEOUS at 09:09

## 2025-04-01 RX ADMIN — Medication 1 APPLICATION(S): at 05:13

## 2025-04-01 RX ADMIN — Medication 1 APPLICATION(S): at 12:22

## 2025-04-01 NOTE — PROGRESS NOTE ADULT - ASSESSMENT
78 y/o male w/ PMHx CAD s/p CABG, HF (combined HFrEF [EF 25%] and HFpEF [G3DD]) w/ ICD, AFib on Xarelto, severe AS s/p TAVR, COPD on 3-4L home O2, CKD4, HTN, HLD, T2DM, and BPH who presents as a transfer from [facility] for HF evaluation. Ongoing CHF exacerbation now off bumex gtt, new DENISHA on CKD likely 2/2 ongoing cardiorenal and possible component of abdominal compartment syndrome given tremendous ascites, now s/p tunnelled HD catheter.  RRT/Code Stroke on 3/30/25 for lethargy and facial droop, CT negative, likely in the setting of hypercarbia, placed on bipap with improvement.

## 2025-04-01 NOTE — PROGRESS NOTE ADULT - SUBJECTIVE AND OBJECTIVE BOX
Patient is a 79y old  Male who presents with a chief complaint of HF eval (01 Apr 2025 13:55)      SUBJECTIVE / OVERNIGHT EVENTS: pt feels ok, family at bedside     MEDICATIONS  (STANDING):  allopurinol 100 milliGRAM(s) Oral daily  apixaban 2.5 milliGRAM(s) Oral two times a day  aspirin enteric coated 81 milliGRAM(s) Oral daily  atorvastatin 20 milliGRAM(s) Oral at bedtime  calcium acetate 667 milliGRAM(s) Oral three times a day with meals  chlorhexidine 2% Cloths 1 Application(s) Topical daily  chlorhexidine 4% Liquid 1 Application(s) Topical <User Schedule>  dextrose 5%. 1000 milliLiter(s) (50 mL/Hr) IV Continuous <Continuous>  dextrose 5%. 1000 milliLiter(s) (100 mL/Hr) IV Continuous <Continuous>  dextrose 50% Injectable 25 Gram(s) IV Push once  dextrose 50% Injectable 12.5 Gram(s) IV Push once  dextrose 50% Injectable 25 Gram(s) IV Push once  epoetin lashawn (EPOGEN) Injectable 20846 Unit(s) IV Push <User Schedule>  fluticasone propionate/ salmeterol 250-50 MICROgram(s) Diskus 1 Dose(s) Inhalation two times a day  folic acid 1 milliGRAM(s) Oral daily  glucagon  Injectable 1 milliGRAM(s) IntraMuscular once  insulin glargine Injectable (LANTUS) 18 Unit(s) SubCutaneous at bedtime  insulin lispro (ADMELOG) corrective regimen sliding scale   SubCutaneous three times a day before meals  insulin lispro Injectable (ADMELOG) 3 Unit(s) SubCutaneous three times a day before meals  metoprolol succinate ER 25 milliGRAM(s) Oral daily  pantoprazole    Tablet 40 milliGRAM(s) Oral every 12 hours  polyethylene glycol 3350 17 Gram(s) Oral two times a day  psyllium Powder 1 Packet(s) Oral daily  sodium chloride 0.65% Nasal 1 Spray(s) Both Nostrils two times a day  tamsulosin 0.4 milliGRAM(s) Oral at bedtime  tiotropium 2.5 MICROgram(s) Inhaler 2 Puff(s) Inhalation daily    MEDICATIONS  (PRN):  acetaminophen     Tablet .. 650 milliGRAM(s) Oral every 6 hours PRN Temp greater or equal to 38C (100.4F), Mild Pain (1 - 3)  albuterol/ipratropium for Nebulization 3 milliLiter(s) Nebulizer every 6 hours PRN Shortness of Breath and/or Wheezing  bisacodyl 5 milliGRAM(s) Oral every 12 hours PRN Constipation  dextrose Oral Gel 15 Gram(s) Oral once PRN Blood Glucose LESS THAN 70 milliGRAM(s)/deciliter  dextrose Oral Gel 15 Gram(s) Oral once PRN Blood Glucose LESS THAN 70 milliGRAM(s)/deciliter  fentaNYL    Injectable 25 MICROGram(s) IV Push every 5 minutes PRN Moderate Pain (4 - 6)  melatonin 3 milliGRAM(s) Oral at bedtime PRN Insomnia  ondansetron Injectable 4 milliGRAM(s) IV Push once PRN Nausea and/or Vomiting  ondansetron Injectable 4 milliGRAM(s) IV Push every 6 hours PRN Nausea and/or Vomiting  oxyCODONE    IR 5 milliGRAM(s) Oral once PRN Moderate Pain (4 - 6)  sodium chloride 0.9% lock flush 10 milliLiter(s) IV Push every 1 hour PRN Pre/post blood products, medications, blood draw, and to maintain line patency        CAPILLARY BLOOD GLUCOSE      POCT Blood Glucose.: 221 mg/dL (01 Apr 2025 12:53)  POCT Blood Glucose.: 216 mg/dL (01 Apr 2025 08:35)  POCT Blood Glucose.: 176 mg/dL (31 Mar 2025 21:06)  POCT Blood Glucose.: 151 mg/dL (31 Mar 2025 17:11)    I&O's Summary    31 Mar 2025 07:01  -  01 Apr 2025 07:00  --------------------------------------------------------  IN: 120 mL / OUT: 0 mL / NET: 120 mL        PHYSICAL EXAM:  CONSTITUTIONAL: NAD  EYES: PERRLA; conjunctiva and sclera clear  NECK: Supple  RESPIRATORY: Normal respiratory effort; CTAB  CARDIOVASCULAR: RRR, S1S2  ABDOMEN: distended  MUSCLOSKELETAL:  anasarca  PSYCH: A+O x 2-3,    LABS:                        10.3   12.69 )-----------( 132      ( 01 Apr 2025 10:40 )             35.1     04-01    135  |  92[L]  |  55[H]  ----------------------------<  215[H]  4.4   |  26  |  5.20[H]    Ca    8.4      01 Apr 2025 10:40  Phos  4.2     04-01  Mg     2.5     04-01    TPro  7.1  /  Alb  3.2[L]  /  TBili  0.8  /  DBili  x   /  AST  54[H]  /  ALT  7[L]  /  AlkPhos  241[H]  04-01          Urinalysis Basic - ( 01 Apr 2025 10:40 )    Color: x / Appearance: x / SG: x / pH: x  Gluc: 215 mg/dL / Ketone: x  / Bili: x / Urobili: x   Blood: x / Protein: x / Nitrite: x   Leuk Esterase: x / RBC: x / WBC x   Sq Epi: x / Non Sq Epi: x / Bacteria: x        RADIOLOGY & ADDITIONAL TESTS:    Imaging Personally Reviewed:    Consultant(s) Notes Reviewed:      Care Discussed with Consultants/Other Providers:

## 2025-04-01 NOTE — PROGRESS NOTE ADULT - SUBJECTIVE AND OBJECTIVE BOX
On NC O2, MS at baseline     Vital Signs Last 24 Hrs  T(C): 37 (04-01-25 @ 11:53), Max: 37.3 (03-31-25 @ 20:05)  T(F): 98.6 (04-01-25 @ 11:53), Max: 99.2 (03-31-25 @ 23:31)  HR: 71 (04-01-25 @ 11:53) (66 - 75)  BP: 98/60 (04-01-25 @ 11:53) (93/54 - 110/74)  BP(mean): 73 (04-01-25 @ 11:53) (73 - 73)  RR: 18 (04-01-25 @ 11:53) (18 - 18)  SpO2: 96% (04-01-25 @ 11:53) (90% - 97%)    s1s2  b/l air entry  soft, ascites   edema                                                                                                                                                                                     10.3   12.69 )-----------( 132      ( 01 Apr 2025 10:40 )             35.1     01 Apr 2025 10:40    135    |  92     |  55     ----------------------------<  215    4.4     |  26     |  5.20     Ca    8.4        01 Apr 2025 10:40  Phos  4.2       01 Apr 2025 10:40  Mg     2.5       01 Apr 2025 10:40    TPro  7.1    /  Alb  3.2    /  TBili  0.8    /  DBili  x      /  AST  54     /  ALT  7      /  AlkPhos  241    01 Apr 2025 10:40    LIVER FUNCTIONS - ( 01 Apr 2025 10:40 )  Alb: 3.2 g/dL / Pro: 7.1 g/dL / ALK PHOS: 241 U/L / ALT: 7 U/L / AST: 54 U/L / GGT: x           Culture - Blood (collected 30 Mar 2025 17:36)  Source: Blood Blood-Peripheral  Preliminary Report (31 Mar 2025 22:01):    No growth at 24 hours    acetaminophen     Tablet .. 650 milliGRAM(s) Oral every 6 hours PRN  albuterol/ipratropium for Nebulization 3 milliLiter(s) Nebulizer every 6 hours PRN  allopurinol 100 milliGRAM(s) Oral daily  apixaban 2.5 milliGRAM(s) Oral two times a day  aspirin enteric coated 81 milliGRAM(s) Oral daily  atorvastatin 20 milliGRAM(s) Oral at bedtime  bisacodyl 5 milliGRAM(s) Oral every 12 hours PRN  calcium acetate 667 milliGRAM(s) Oral three times a day with meals  chlorhexidine 2% Cloths 1 Application(s) Topical daily  chlorhexidine 4% Liquid 1 Application(s) Topical <User Schedule>  dextrose 5%. 1000 milliLiter(s) IV Continuous <Continuous>  dextrose 5%. 1000 milliLiter(s) IV Continuous <Continuous>  dextrose 50% Injectable 25 Gram(s) IV Push once  dextrose 50% Injectable 12.5 Gram(s) IV Push once  dextrose 50% Injectable 25 Gram(s) IV Push once  dextrose Oral Gel 15 Gram(s) Oral once PRN  dextrose Oral Gel 15 Gram(s) Oral once PRN  epoetin lashawn (EPOGEN) Injectable 06611 Unit(s) IV Push <User Schedule>  fentaNYL    Injectable 25 MICROGram(s) IV Push every 5 minutes PRN  fluticasone propionate/ salmeterol 250-50 MICROgram(s) Diskus 1 Dose(s) Inhalation two times a day  folic acid 1 milliGRAM(s) Oral daily  glucagon  Injectable 1 milliGRAM(s) IntraMuscular once  insulin glargine Injectable (LANTUS) 18 Unit(s) SubCutaneous at bedtime  insulin lispro (ADMELOG) corrective regimen sliding scale   SubCutaneous three times a day before meals  insulin lispro Injectable (ADMELOG) 3 Unit(s) SubCutaneous three times a day before meals  melatonin 3 milliGRAM(s) Oral at bedtime PRN  metoprolol succinate ER 25 milliGRAM(s) Oral daily  ondansetron Injectable 4 milliGRAM(s) IV Push once PRN  ondansetron Injectable 4 milliGRAM(s) IV Push every 6 hours PRN  oxyCODONE    IR 5 milliGRAM(s) Oral once PRN  pantoprazole    Tablet 40 milliGRAM(s) Oral every 12 hours  polyethylene glycol 3350 17 Gram(s) Oral two times a day  psyllium Powder 1 Packet(s) Oral daily  sodium chloride 0.65% Nasal 1 Spray(s) Both Nostrils two times a day  sodium chloride 0.9% lock flush 10 milliLiter(s) IV Push every 1 hour PRN  tamsulosin 0.4 milliGRAM(s) Oral at bedtime  tiotropium 2.5 MICROgram(s) Inhaler 2 Puff(s) Inhalation daily    A/P:    COPD, on home O2  CM, EF ~ 30%, severe TR  Adm to [facility] VS 1/10/25 w/fluid overload   Tx to [facility] 2/5/25 for HF eval    S/p LVP 3/14 w/4.5L fluid removal, s/p SPA   Dropped Hgb to 5.3 on 3/15, s/p PRBCs   Hemodynamic/cardio-renal DENISHA/CKD   Started on HD 3/24 via temp HD cath  S/p perm cath 3/28  HD tomorrow   TMP 2kg as tolerates w/HD   Arrangements for CHIARA w/HD in progress  D/w family at bedside     [phone]

## 2025-04-02 LAB
ALBUMIN SERPL ELPH-MCNC: 3.1 G/DL — LOW (ref 3.3–5)
ALP SERPL-CCNC: 239 U/L — HIGH (ref 40–120)
ALT FLD-CCNC: <5 U/L — LOW (ref 10–45)
ANION GAP SERPL CALC-SCNC: 15 MMOL/L — SIGNIFICANT CHANGE UP (ref 5–17)
AST SERPL-CCNC: 43 U/L — HIGH (ref 10–40)
BILIRUB SERPL-MCNC: 0.7 MG/DL — SIGNIFICANT CHANGE UP (ref 0.2–1.2)
BUN SERPL-MCNC: 65 MG/DL — HIGH (ref 7–23)
CALCIUM SERPL-MCNC: 8.5 MG/DL — SIGNIFICANT CHANGE UP (ref 8.4–10.5)
CHLORIDE SERPL-SCNC: 94 MMOL/L — LOW (ref 96–108)
CO2 SERPL-SCNC: 24 MMOL/L — SIGNIFICANT CHANGE UP (ref 22–31)
CREAT SERPL-MCNC: 5.96 MG/DL — HIGH (ref 0.5–1.3)
EGFR: 9 ML/MIN/1.73M2 — LOW
EGFR: 9 ML/MIN/1.73M2 — LOW
GLUCOSE BLDC GLUCOMTR-MCNC: 160 MG/DL — HIGH (ref 70–99)
GLUCOSE BLDC GLUCOMTR-MCNC: 163 MG/DL — HIGH (ref 70–99)
GLUCOSE BLDC GLUCOMTR-MCNC: 177 MG/DL — HIGH (ref 70–99)
GLUCOSE BLDC GLUCOMTR-MCNC: 182 MG/DL — HIGH (ref 70–99)
GLUCOSE SERPL-MCNC: 136 MG/DL — HIGH (ref 70–99)
HCT VFR BLD CALC: 37.6 % — LOW (ref 39–50)
HGB BLD-MCNC: 10.7 G/DL — LOW (ref 13–17)
MCHC RBC-ENTMCNC: 25.8 PG — LOW (ref 27–34)
MCHC RBC-ENTMCNC: 28.5 G/DL — LOW (ref 32–36)
MCV RBC AUTO: 90.8 FL — SIGNIFICANT CHANGE UP (ref 80–100)
NRBC BLD AUTO-RTO: 1 /100 WBCS — HIGH (ref 0–0)
PLATELET # BLD AUTO: 122 K/UL — LOW (ref 150–400)
POTASSIUM SERPL-MCNC: 4.5 MMOL/L — SIGNIFICANT CHANGE UP (ref 3.5–5.3)
POTASSIUM SERPL-SCNC: 4.5 MMOL/L — SIGNIFICANT CHANGE UP (ref 3.5–5.3)
PROT SERPL-MCNC: 7.1 G/DL — SIGNIFICANT CHANGE UP (ref 6–8.3)
RBC # BLD: 4.14 M/UL — LOW (ref 4.2–5.8)
RBC # FLD: 18.1 % — HIGH (ref 10.3–14.5)
SODIUM SERPL-SCNC: 133 MMOL/L — LOW (ref 135–145)
WBC # BLD: 11.3 K/UL — HIGH (ref 3.8–10.5)
WBC # FLD AUTO: 11.3 K/UL — HIGH (ref 3.8–10.5)

## 2025-04-02 PROCEDURE — 99233 SBSQ HOSP IP/OBS HIGH 50: CPT

## 2025-04-02 RX ADMIN — INSULIN LISPRO 3 UNIT(S): 100 INJECTION, SOLUTION INTRAVENOUS; SUBCUTANEOUS at 17:54

## 2025-04-02 RX ADMIN — Medication 40 MILLIGRAM(S): at 17:47

## 2025-04-02 RX ADMIN — Medication 1 PACKET(S): at 22:12

## 2025-04-02 RX ADMIN — ATORVASTATIN CALCIUM 20 MILLIGRAM(S): 80 TABLET, FILM COATED ORAL at 22:12

## 2025-04-02 RX ADMIN — INSULIN LISPRO 2: 100 INJECTION, SOLUTION INTRAVENOUS; SUBCUTANEOUS at 08:56

## 2025-04-02 RX ADMIN — POLYETHYLENE GLYCOL 3350 17 GRAM(S): 17 POWDER, FOR SOLUTION ORAL at 17:47

## 2025-04-02 RX ADMIN — Medication 40 MILLIGRAM(S): at 06:00

## 2025-04-02 RX ADMIN — Medication 1 DOSE(S): at 17:47

## 2025-04-02 RX ADMIN — APIXABAN 2.5 MILLIGRAM(S): 2.5 TABLET, FILM COATED ORAL at 17:47

## 2025-04-02 RX ADMIN — METOPROLOL SUCCINATE 25 MILLIGRAM(S): 50 TABLET, EXTENDED RELEASE ORAL at 06:00

## 2025-04-02 RX ADMIN — Medication 667 MILLIGRAM(S): at 08:59

## 2025-04-02 RX ADMIN — Medication 1 APPLICATION(S): at 11:46

## 2025-04-02 RX ADMIN — INSULIN LISPRO 2: 100 INJECTION, SOLUTION INTRAVENOUS; SUBCUTANEOUS at 17:53

## 2025-04-02 RX ADMIN — APIXABAN 2.5 MILLIGRAM(S): 2.5 TABLET, FILM COATED ORAL at 06:00

## 2025-04-02 RX ADMIN — POLYETHYLENE GLYCOL 3350 17 GRAM(S): 17 POWDER, FOR SOLUTION ORAL at 06:01

## 2025-04-02 RX ADMIN — Medication 4 MILLIGRAM(S): at 22:41

## 2025-04-02 RX ADMIN — Medication 1 DOSE(S): at 06:01

## 2025-04-02 RX ADMIN — Medication 100 MILLIGRAM(S): at 11:48

## 2025-04-02 RX ADMIN — Medication 1 APPLICATION(S): at 06:01

## 2025-04-02 RX ADMIN — Medication 667 MILLIGRAM(S): at 17:47

## 2025-04-02 RX ADMIN — TAMSULOSIN HYDROCHLORIDE 0.4 MILLIGRAM(S): 0.4 CAPSULE ORAL at 22:12

## 2025-04-02 RX ADMIN — INSULIN GLARGINE-YFGN 18 UNIT(S): 100 INJECTION, SOLUTION SUBCUTANEOUS at 22:11

## 2025-04-02 RX ADMIN — FOLIC ACID 1 MILLIGRAM(S): 1 TABLET ORAL at 11:47

## 2025-04-02 RX ADMIN — Medication 1 SPRAY(S): at 06:01

## 2025-04-02 RX ADMIN — Medication 1 SPRAY(S): at 17:53

## 2025-04-02 RX ADMIN — Medication 81 MILLIGRAM(S): at 11:47

## 2025-04-02 RX ADMIN — INSULIN LISPRO 3 UNIT(S): 100 INJECTION, SOLUTION INTRAVENOUS; SUBCUTANEOUS at 08:57

## 2025-04-02 RX ADMIN — TIOTROPIUM BROMIDE INHALATION SPRAY 2 PUFF(S): 3.12 SPRAY, METERED RESPIRATORY (INHALATION) at 17:48

## 2025-04-02 RX ADMIN — Medication 667 MILLIGRAM(S): at 11:47

## 2025-04-02 NOTE — PROGRESS NOTE ADULT - SUBJECTIVE AND OBJECTIVE BOX
[facility], [facility]  NEPHROLOGY AND HYPERTENSION  [address]  [address]  [place] [address]  [phone]    MD DANIEL GARRETT MD YELENA ROSENBERG, MD BINNY KOSHY, MD CHRISTOPHER CAPUTO, MD EDWARD BOVER, MD          Patient events noted  No distress    MEDICATIONS  (STANDING):  allopurinol 100 milliGRAM(s) Oral daily  apixaban 2.5 milliGRAM(s) Oral two times a day  aspirin enteric coated 81 milliGRAM(s) Oral daily  atorvastatin 20 milliGRAM(s) Oral at bedtime  calcium acetate 667 milliGRAM(s) Oral three times a day with meals  chlorhexidine 2% Cloths 1 Application(s) Topical daily  chlorhexidine 4% Liquid 1 Application(s) Topical <User Schedule>  dextrose 5%. 1000 milliLiter(s) (100 mL/Hr) IV Continuous <Continuous>  dextrose 5%. 1000 milliLiter(s) (50 mL/Hr) IV Continuous <Continuous>  dextrose 50% Injectable 25 Gram(s) IV Push once  dextrose 50% Injectable 12.5 Gram(s) IV Push once  dextrose 50% Injectable 25 Gram(s) IV Push once  epoetin lashawn (EPOGEN) Injectable 47755 Unit(s) IV Push <User Schedule>  fluticasone propionate/ salmeterol 250-50 MICROgram(s) Diskus 1 Dose(s) Inhalation two times a day  folic acid 1 milliGRAM(s) Oral daily  glucagon  Injectable 1 milliGRAM(s) IntraMuscular once  insulin glargine Injectable (LANTUS) 18 Unit(s) SubCutaneous at bedtime  insulin lispro (ADMELOG) corrective regimen sliding scale   SubCutaneous three times a day before meals  insulin lispro Injectable (ADMELOG) 3 Unit(s) SubCutaneous three times a day before meals  metoprolol succinate ER 25 milliGRAM(s) Oral daily  pantoprazole    Tablet 40 milliGRAM(s) Oral every 12 hours  polyethylene glycol 3350 17 Gram(s) Oral two times a day  psyllium Powder 1 Packet(s) Oral daily  sodium chloride 0.65% Nasal 1 Spray(s) Both Nostrils two times a day  tamsulosin 0.4 milliGRAM(s) Oral at bedtime  tiotropium 2.5 MICROgram(s) Inhaler 2 Puff(s) Inhalation daily    MEDICATIONS  (PRN):  acetaminophen     Tablet .. 650 milliGRAM(s) Oral every 6 hours PRN Temp greater or equal to 38C (100.4F), Mild Pain (1 - 3)  albuterol/ipratropium for Nebulization 3 milliLiter(s) Nebulizer every 6 hours PRN Shortness of Breath and/or Wheezing  bisacodyl 5 milliGRAM(s) Oral every 12 hours PRN Constipation  dextrose Oral Gel 15 Gram(s) Oral once PRN Blood Glucose LESS THAN 70 milliGRAM(s)/deciliter  dextrose Oral Gel 15 Gram(s) Oral once PRN Blood Glucose LESS THAN 70 milliGRAM(s)/deciliter  fentaNYL    Injectable 25 MICROGram(s) IV Push every 5 minutes PRN Moderate Pain (4 - 6)  melatonin 3 milliGRAM(s) Oral at bedtime PRN Insomnia  ondansetron Injectable 4 milliGRAM(s) IV Push once PRN Nausea and/or Vomiting  ondansetron Injectable 4 milliGRAM(s) IV Push every 6 hours PRN Nausea and/or Vomiting  oxyCODONE    IR 5 milliGRAM(s) Oral once PRN Moderate Pain (4 - 6)  sodium chloride 0.9% lock flush 10 milliLiter(s) IV Push every 1 hour PRN Pre/post blood products, medications, blood draw, and to maintain line patency      04-02-25 @ 07:01  -  04-02-25 @ 21:32  --------------------------------------------------------  IN: 540 mL / OUT: 1000 mL / NET: -460 mL      PHYSICAL EXAM:      T(C): 36.5 (04-02-25 @ 17:08), Max: 36.6 (04-01-25 @ 23:55)  HR: 72 (04-02-25 @ 17:08) (67 - 72)  BP: 107/71 (04-02-25 @ 17:08) (91/58 - 119/80)  RR: 18 (04-02-25 @ 17:08) (17 - 18)  SpO2: 95% (04-02-25 @ 17:08) (95% - 98%)  Wt(kg): --  Lungs clear  Heart S1S2  Abd soft NT ND  Extremities:   1 edema                                    10.7   11.30 )-----------( 122      ( 02 Apr 2025 10:25 )             37.6     04-02    133[L]  |  94[L]  |  65[H]  ----------------------------<  136[H]  4.5   |  24  |  5.96[H]    Ca    8.5      02 Apr 2025 10:25  Phos  4.2     04-01  Mg     2.5     04-01    TPro  7.1  /  Alb  3.1[L]  /  TBili  0.7  /  DBili  x   /  AST  43[H]  /  ALT  <5[L]  /  AlkPhos  239[H]  04-02      LIVER FUNCTIONS - ( 02 Apr 2025 10:25 )  Alb: 3.1 g/dL / Pro: 7.1 g/dL / ALK PHOS: 239 U/L / ALT: <5 U/L / AST: 43 U/L / GGT: x           Creatinine Trend: 5.96<--, 5.20<--, 6.17<--, 5.83<--, 5.55<--, 5.08<--    A/P:    COPD, on home O2  CM, EF ~ 30%, severe TR  Adm to [facility] 1/10/25 w/fluid overload   Tx to [facility] 2/5/25 for HF eval    S/p LVP 3/14 w/4.5L fluid removal, s/p SPA   Dropped Hgb to 5.3 on 3/15, s/p PRBCs   Hemodynamic/cardio-renal DENISHA/CKD   Started on HD 3/24 via temp HD cath  S/p perm cath 3/28  HD for today   TMP 2kg as tolerates w/HD   Arrangements for CHAIRA w/HD in progress  D/w family at bedside       Jon Rowe MD

## 2025-04-02 NOTE — PROGRESS NOTE ADULT - SUBJECTIVE AND OBJECTIVE BOX
Patient is a 79y old  Male who presents with a chief complaint of HF eval (01 Apr 2025 14:16)      SUBJECTIVE / OVERNIGHT EVENTS: pt appears comfortable, denies cp,. sob     MEDICATIONS  (STANDING):  allopurinol 100 milliGRAM(s) Oral daily  apixaban 2.5 milliGRAM(s) Oral two times a day  aspirin enteric coated 81 milliGRAM(s) Oral daily  atorvastatin 20 milliGRAM(s) Oral at bedtime  calcium acetate 667 milliGRAM(s) Oral three times a day with meals  chlorhexidine 2% Cloths 1 Application(s) Topical daily  chlorhexidine 4% Liquid 1 Application(s) Topical <User Schedule>  dextrose 5%. 1000 milliLiter(s) (50 mL/Hr) IV Continuous <Continuous>  dextrose 5%. 1000 milliLiter(s) (100 mL/Hr) IV Continuous <Continuous>  dextrose 50% Injectable 25 Gram(s) IV Push once  dextrose 50% Injectable 12.5 Gram(s) IV Push once  dextrose 50% Injectable 25 Gram(s) IV Push once  epoetin lashawn (EPOGEN) Injectable 99842 Unit(s) IV Push <User Schedule>  fluticasone propionate/ salmeterol 250-50 MICROgram(s) Diskus 1 Dose(s) Inhalation two times a day  folic acid 1 milliGRAM(s) Oral daily  glucagon  Injectable 1 milliGRAM(s) IntraMuscular once  insulin glargine Injectable (LANTUS) 18 Unit(s) SubCutaneous at bedtime  insulin lispro (ADMELOG) corrective regimen sliding scale   SubCutaneous three times a day before meals  insulin lispro Injectable (ADMELOG) 3 Unit(s) SubCutaneous three times a day before meals  metoprolol succinate ER 25 milliGRAM(s) Oral daily  pantoprazole    Tablet 40 milliGRAM(s) Oral every 12 hours  polyethylene glycol 3350 17 Gram(s) Oral two times a day  psyllium Powder 1 Packet(s) Oral daily  sodium chloride 0.65% Nasal 1 Spray(s) Both Nostrils two times a day  tamsulosin 0.4 milliGRAM(s) Oral at bedtime  tiotropium 2.5 MICROgram(s) Inhaler 2 Puff(s) Inhalation daily    MEDICATIONS  (PRN):  acetaminophen     Tablet .. 650 milliGRAM(s) Oral every 6 hours PRN Temp greater or equal to 38C (100.4F), Mild Pain (1 - 3)  albuterol/ipratropium for Nebulization 3 milliLiter(s) Nebulizer every 6 hours PRN Shortness of Breath and/or Wheezing  bisacodyl 5 milliGRAM(s) Oral every 12 hours PRN Constipation  dextrose Oral Gel 15 Gram(s) Oral once PRN Blood Glucose LESS THAN 70 milliGRAM(s)/deciliter  dextrose Oral Gel 15 Gram(s) Oral once PRN Blood Glucose LESS THAN 70 milliGRAM(s)/deciliter  fentaNYL    Injectable 25 MICROGram(s) IV Push every 5 minutes PRN Moderate Pain (4 - 6)  melatonin 3 milliGRAM(s) Oral at bedtime PRN Insomnia  ondansetron Injectable 4 milliGRAM(s) IV Push once PRN Nausea and/or Vomiting  ondansetron Injectable 4 milliGRAM(s) IV Push every 6 hours PRN Nausea and/or Vomiting  oxyCODONE    IR 5 milliGRAM(s) Oral once PRN Moderate Pain (4 - 6)  sodium chloride 0.9% lock flush 10 milliLiter(s) IV Push every 1 hour PRN Pre/post blood products, medications, blood draw, and to maintain line patency        CAPILLARY BLOOD GLUCOSE      POCT Blood Glucose.: 160 mg/dL (02 Apr 2025 12:20)  POCT Blood Glucose.: 163 mg/dL (02 Apr 2025 08:44)  POCT Blood Glucose.: 163 mg/dL (01 Apr 2025 21:30)  POCT Blood Glucose.: 144 mg/dL (01 Apr 2025 17:37)    I&O's Summary      PHYSICAL EXAM:  CONSTITUTIONAL: NAD  EYES: PERRLA; conjunctiva and sclera clear  NECK: Supple  RESPIRATORY: Normal respiratory effort; CTAB  CARDIOVASCULAR: RRR, S1S2  ABDOMEN: distended  MUSCLOSKELETAL:  anasarca  PSYCH: A+O x 2-3,    LABS:                        10.7   11.30 )-----------( 122      ( 02 Apr 2025 10:25 )             37.6     04-02    133[L]  |  94[L]  |  65[H]  ----------------------------<  136[H]  4.5   |  24  |  5.96[H]    Ca    8.5      02 Apr 2025 10:25  Phos  4.2     04-01  Mg     2.5     04-01    TPro  7.1  /  Alb  3.1[L]  /  TBili  0.7  /  DBili  x   /  AST  43[H]  /  ALT  <5[L]  /  AlkPhos  239[H]  04-02          Urinalysis Basic - ( 02 Apr 2025 10:25 )    Color: x / Appearance: x / SG: x / pH: x  Gluc: 136 mg/dL / Ketone: x  / Bili: x / Urobili: x   Blood: x / Protein: x / Nitrite: x   Leuk Esterase: x / RBC: x / WBC x   Sq Epi: x / Non Sq Epi: x / Bacteria: x        RADIOLOGY & ADDITIONAL TESTS:    Imaging Personally Reviewed:    Consultant(s) Notes Reviewed:      Care Discussed with Consultants/Other Providers:

## 2025-04-02 NOTE — PROGRESS NOTE ADULT - ASSESSMENT
78 y/o male w/ PMHx CAD s/p CABG, HF (combined HFrEF [EF 25%] and HFpEF [G3DD]) w/ ICD, AFib on Xarelto, severe AS s/p TAVR, COPD on 3-4L home O2, CKD4, HTN, HLD, T2DM, and BPH who presents as a transfer from [facility] for HF evaluation. Ongoing CHF exacerbation now off bumex gtt, new DENISHA on CKD likely 2/2 ongoing cardiorenal and possible component of abdominal compartment syndrome given tremendous ascites, now s/p tunnelled HD catheter.  RRT/Code Stroke on 3/30/25 for lethargy and facial droop, CT negative, likely in the setting of hypercarbia, placed on bipap with improvement.

## 2025-04-02 NOTE — CHART NOTE - NSCHARTNOTEFT_GEN_A_CORE
Case discussed with primary team attending, plan for rehab and continued HD. Please reconsult as needed if further GOC. Can be reached by TEAMS M-F 9-5 Susan Guallpa Any other time please page [phone] if needed

## 2025-04-03 LAB
ALBUMIN SERPL ELPH-MCNC: 3 G/DL — LOW (ref 3.3–5)
ALP SERPL-CCNC: 231 U/L — HIGH (ref 40–120)
ALT FLD-CCNC: <5 U/L — LOW (ref 10–45)
ANION GAP SERPL CALC-SCNC: 12 MMOL/L — SIGNIFICANT CHANGE UP (ref 5–17)
AST SERPL-CCNC: 30 U/L — SIGNIFICANT CHANGE UP (ref 10–40)
BASE EXCESS BLDA CALC-SCNC: 3.8 MMOL/L — HIGH (ref -2–3)
BILIRUB SERPL-MCNC: 0.6 MG/DL — SIGNIFICANT CHANGE UP (ref 0.2–1.2)
BUN SERPL-MCNC: 45 MG/DL — HIGH (ref 7–23)
CALCIUM SERPL-MCNC: 8.5 MG/DL — SIGNIFICANT CHANGE UP (ref 8.4–10.5)
CHLORIDE SERPL-SCNC: 96 MMOL/L — SIGNIFICANT CHANGE UP (ref 96–108)
CO2 BLDA-SCNC: 34 MMOL/L — HIGH (ref 19–24)
CO2 SERPL-SCNC: 25 MMOL/L — SIGNIFICANT CHANGE UP (ref 22–31)
CREAT SERPL-MCNC: 4.71 MG/DL — HIGH (ref 0.5–1.3)
EGFR: 12 ML/MIN/1.73M2 — LOW
EGFR: 12 ML/MIN/1.73M2 — LOW
GAS PNL BLDA: SIGNIFICANT CHANGE UP
GAS PNL BLDA: SIGNIFICANT CHANGE UP
GLUCOSE BLDC GLUCOMTR-MCNC: 113 MG/DL — HIGH (ref 70–99)
GLUCOSE BLDC GLUCOMTR-MCNC: 127 MG/DL — HIGH (ref 70–99)
GLUCOSE BLDC GLUCOMTR-MCNC: 186 MG/DL — HIGH (ref 70–99)
GLUCOSE BLDC GLUCOMTR-MCNC: 218 MG/DL — HIGH (ref 70–99)
GLUCOSE SERPL-MCNC: 150 MG/DL — HIGH (ref 70–99)
HCO3 BLDA-SCNC: 32 MMOL/L — HIGH (ref 21–28)
HOROWITZ INDEX BLDA+IHG-RTO: 30 — SIGNIFICANT CHANGE UP
MAGNESIUM SERPL-MCNC: 2.4 MG/DL — SIGNIFICANT CHANGE UP (ref 1.6–2.6)
PCO2 BLDA: 64 MMHG — HIGH (ref 35–48)
PH BLDA: 7.3 — LOW (ref 7.35–7.45)
PHOSPHATE SERPL-MCNC: 5.6 MG/DL — HIGH (ref 2.5–4.5)
PO2 BLDA: 75 MMHG — LOW (ref 83–108)
POTASSIUM SERPL-MCNC: 4.4 MMOL/L — SIGNIFICANT CHANGE UP (ref 3.5–5.3)
POTASSIUM SERPL-SCNC: 4.4 MMOL/L — SIGNIFICANT CHANGE UP (ref 3.5–5.3)
PROT SERPL-MCNC: 7.2 G/DL — SIGNIFICANT CHANGE UP (ref 6–8.3)
PYRIDOXAL PHOS SERPL-MCNC: SIGNIFICANT CHANGE UP UG/L
SAO2 % BLDA: 97.9 % — SIGNIFICANT CHANGE UP (ref 94–98)
SODIUM SERPL-SCNC: 133 MMOL/L — LOW (ref 135–145)

## 2025-04-03 PROCEDURE — 99233 SBSQ HOSP IP/OBS HIGH 50: CPT

## 2025-04-03 PROCEDURE — 71045 X-RAY EXAM CHEST 1 VIEW: CPT | Mod: 26

## 2025-04-03 PROCEDURE — 93010 ELECTROCARDIOGRAM REPORT: CPT

## 2025-04-03 RX ORDER — BUMETANIDE 1 MG/1
2 TABLET ORAL EVERY 12 HOURS
Refills: 0 | Status: DISCONTINUED | OUTPATIENT
Start: 2025-04-03 | End: 2025-04-14

## 2025-04-03 RX ADMIN — Medication 1 APPLICATION(S): at 13:20

## 2025-04-03 RX ADMIN — BUMETANIDE 2 MILLIGRAM(S): 1 TABLET ORAL at 16:05

## 2025-04-03 RX ADMIN — Medication 1 APPLICATION(S): at 06:50

## 2025-04-03 RX ADMIN — INSULIN LISPRO 4: 100 INJECTION, SOLUTION INTRAVENOUS; SUBCUTANEOUS at 13:16

## 2025-04-03 RX ADMIN — INSULIN GLARGINE-YFGN 18 UNIT(S): 100 INJECTION, SOLUTION SUBCUTANEOUS at 22:39

## 2025-04-03 RX ADMIN — INSULIN LISPRO 2: 100 INJECTION, SOLUTION INTRAVENOUS; SUBCUTANEOUS at 09:04

## 2025-04-03 NOTE — PROGRESS NOTE ADULT - ASSESSMENT
80 y/o male w/ PMHx CAD s/p CABG, HF (combined HFrEF [EF 25%] and HFpEF [G3DD]) w/ ICD, AFib on Xarelto, severe AS s/p TAVR, COPD on 3-4L home O2, CKD4, HTN, HLD, T2DM, and BPH who presents as a transfer from [facility] for HF evaluation. Ongoing CHF exacerbation now off bumex gtt, new DENISHA on CKD likely 2/2 ongoing cardiorenal and possible component of abdominal compartment syndrome given tremendous ascites, now s/p tunnelled HD catheter.  RRT/Code Stroke on 3/30/25 for lethargy and facial droop, CT negative, likely in the setting of hypercarbia, placed on bipap with improvement.

## 2025-04-03 NOTE — PROVIDER CONTACT NOTE (CRITICAL VALUE NOTIFICATION) - ASSESSMENT
pt at baseline AO3-4, alert and follows commands. Upon assessment, pt noted to be very lethargic on exam, has eye opening but falls back asleep; not answering questions or following commands. Vitals otherwise stable. has order for bipap HS, however remained on 3L NC overnight due to vomiting overnight and concern for aspiration

## 2025-04-03 NOTE — CHART NOTE - NSCHARTNOTEFT_GEN_A_CORE
MEDICINE NP    Notified by RN patient with Lethargy. Seen and examined patient at bedside. Patient is Lethargic was supposed to be on BiPAP overnight but patient had 2 to 3 episodes of emesis and was kept off bipap overnight. Patient became more lethargic. ABG shows patient is in respiratory acidosis. Patient placed on bipap. Called overnight hsopitalist for 4/2/25 to discuss patient clinical picture. Base on conversation with hospitalist at this time no role for MICU patient is DNRI/DNI wIth Trial of NIV. Called respiratory to change bipap settings to blow out more CO2. called patient's family (wife) to update her on patient condition. She voiced her opinion that patient should have been placed on bipap after his emesis episodes. I did explain to patient's wife that to be placed on bipap after emesis was not safe. She expressed concern and was upset that her  should have been placed on the BIPAP. Ag's wife did not want to accept the reason why he was kept off bipap. Patient went several hours without an emesis episode with current conditions, it is clinically ok to placed patient back on bipap.     VITAL SIGNS:  T(C): 36.5 (04-03-25 @ 00:05), Max: 36.5 (04-02-25 @ 12:33)  HR: 62 (04-03-25 @ 04:40) (62 - 77)  BP: 133/77 (04-03-25 @ 04:00) (91/58 - 133/77)  RR: 18 (04-03-25 @ 04:00) (17 - 18)  SpO2: 94% (04-03-25 @ 04:40) (94% - 98%)  Wt(kg): --      LABORATORY:                          10.7   11.30 )-----------( 122      ( 02 Apr 2025 10:25 )             37.6       04-03    133[L]  |  96  |  45[H]  ----------------------------<  150[H]  4.4   |  25  |  4.71[H]    Ca    8.5      03 Apr 2025 04:41  Phos  5.6     04-03  Mg     2.4     04-03    TPro  7.2  /  Alb  3.0[L]  /  TBili  0.6  /  DBili  x   /  AST  30  /  ALT  <5[L]  /  AlkPhos  231[H]  04-03          MICROBIOLOGY:           RADIOLOGY:          PHYSICAL EXAM:    Constitutional: AOx3. NAD.    Respiratory: clear lungs bilaterally. No wheezing, rhonchi, or crackles.    Cardiovascular: S1 S2. No murmurs.    Gastrointestinal: BS X4 active. soft. nontender.    Extremities/Vascular: +2 pulses bilaterally. No BLE edema.      ASSESSMENT/PLAN:   HPI:  This is an 78 y/o male w/ PMHx CAD s/p CABG, HF (combined HFrEF [EF 25%] and HFpEF [G3DD]) w/ ICD, AFib on Xarelto, severe AS s/p TAVR, COPD on 3-4L home O2, CKD3, HTN, HLD, T2DM, and BPH who presents as a transfer from [facility] for HF evaluation. He presented to [place] on 1/10/25 for worsening of his chronic SOB for 2 weeks, with associated increased swelling of legs and abdomen. He was admitted for acute on chronic hypoxic respiratory failure due to both anemia and acute on chronic CHF exacerbation. He was anemic to 6.5 on presentation there and hypoxic requiring Bipap. He was started on bumex drip, then transitioned to IVP lasix 80mg BID, then had to be transitioned to diamox due to his course being complicated by contraction alkalosis and hypokalemia. Then had Bumex 2mg BID started as he was still volume overloaded. He is now back to his baseline O2 requirements and was transferred here for evaluation by HF team per recommendation by cardiology at [facility] due to his persistent SOB with minimal exertion. Here he is afebrile, hemodynamically stable, saturating 92% on 3LNC. States that he isn't feeling any SOB at the moment. Says his legs are still swollen but they are much better than when he was admitted to [facility]. Patient found to be in respiratory acidosis of bipap. now on bipap.    1) Lethargic  -ABG  -bipap  -Labs  -CXR  -c/w tele  -F/U primary team in AM MEDICINE NP    Notified by RN patient with Lethargy. Seen and examined patient at bedside. Patient is Lethargic was supposed to be on BiPAP overnight but patient had 2 to 3 episodes of emesis and was kept off bipap overnight. Patient became more lethargic. ABG shows patient is in respiratory acidosis. Patient placed on bipap. Called overnight hsopitalist for 4/2/25 to discuss patient clinical picture. Base on conversation with hospitalist at this time no role for MICU patient is DNRI/DNI wIth Trial of NIV. Called respiratory to change bipap settings to blow out more CO2. called patient's family (wife) to update her on patient condition. She voiced her opinion that patient should have been placed on bipap after his emesis episodes. I did explain to patient's wife that to be placed on bipap after emesis was not safe. She expressed concern and was upset that her  should have been placed on the BIPAP. Ag's wife did not want to accept the reason why he was kept off bipap. Patient went several hours without an emesis episode with current conditions, it is clinically ok to placed patient back on bipap. Patient became more arousable while on BIPAP.     VITAL SIGNS:  T(C): 36.5 (04-03-25 @ 00:05), Max: 36.5 (04-02-25 @ 12:33)  HR: 62 (04-03-25 @ 04:40) (62 - 77)  BP: 133/77 (04-03-25 @ 04:00) (91/58 - 133/77)  RR: 18 (04-03-25 @ 04:00) (17 - 18)  SpO2: 94% (04-03-25 @ 04:40) (94% - 98%)  Wt(kg): --      LABORATORY:                          10.7   11.30 )-----------( 122      ( 02 Apr 2025 10:25 )             37.6       04-03    133[L]  |  96  |  45[H]  ----------------------------<  150[H]  4.4   |  25  |  4.71[H]    Ca    8.5      03 Apr 2025 04:41  Phos  5.6     04-03  Mg     2.4     04-03    TPro  7.2  /  Alb  3.0[L]  /  TBili  0.6  /  DBili  x   /  AST  30  /  ALT  <5[L]  /  AlkPhos  231[H]  04-03          MICROBIOLOGY:           RADIOLOGY:          PHYSICAL EXAM:    Constitutional: AOx3. NAD.    Respiratory: clear lungs bilaterally. No wheezing, rhonchi, or crackles.    Cardiovascular: S1 S2. No murmurs.    Gastrointestinal: BS X4 active. soft. nontender.    Extremities/Vascular: +2 pulses bilaterally. No BLE edema.      ASSESSMENT/PLAN:   HPI:  This is an 80 y/o male w/ PMHx CAD s/p CABG, HF (combined HFrEF [EF 25%] and HFpEF [G3DD]) w/ ICD, AFib on Xarelto, severe AS s/p TAVR, COPD on 3-4L home O2, CKD3, HTN, HLD, T2DM, and BPH who presents as a transfer from [facility] for HF evaluation. He presented to [place] on 1/10/25 for worsening of his chronic SOB for 2 weeks, with associated increased swelling of legs and abdomen. He was admitted for acute on chronic hypoxic respiratory failure due to both anemia and acute on chronic CHF exacerbation. He was anemic to 6.5 on presentation there and hypoxic requiring Bipap. He was started on bumex drip, then transitioned to IVP lasix 80mg BID, then had to be transitioned to diamox due to his course being complicated by contraction alkalosis and hypokalemia. Then had Bumex 2mg BID started as he was still volume overloaded. He is now back to his baseline O2 requirements and was transferred here for evaluation by HF team per recommendation by cardiology at [facility] due to his persistent SOB with minimal exertion. Here he is afebrile, hemodynamically stable, saturating 92% on 3LNC. States that he isn't feeling any SOB at the moment. Says his legs are still swollen but they are much better than when he was admitted to [facility]. Patient found to be in respiratory acidosis of bipap. now on bipap.    1) Lethargic  -ABG  -bipap  -Labs  -CXR  -c/w tele  -F/U primary team in AM

## 2025-04-03 NOTE — PROGRESS NOTE ADULT - PROBLEM SELECTOR PLAN 3
- EF 30% with severe tricuspid regurg and PASP 55mmHG on 2/7/24. Dry wt is 243 lbs.   - s/p bumex 4mg iv TID   - GDMT:  Metoprolol succinate 50mg day. Holding entresto and aldactone for DENISHA. Not on SGLT2.  - Hydralazine/Nitrate: hydral 50mg TID. Isordil 20mg TID   - patient declined RHC and cardiomems  - per GOC with wife; pt dnr/dni but they wish to keep ICD active  - remove ro

## 2025-04-03 NOTE — PROVIDER CONTACT NOTE (CRITICAL VALUE NOTIFICATION) - ACTION/TREATMENT ORDERED:
provider made aware. BIPAP placed on patient, to remain on cont bipap. provider to bedside. RT to bedside. plan of care ongoing. CXR ordered

## 2025-04-03 NOTE — PROVIDER CONTACT NOTE (OTHER) - BACKGROUND
pmhx heart failure, COPD, HTN, s/p AICD     Diagnosis SOB and anemia
admitted for HF
Patient admitted for Heart failure.
78 y/o male w/ PMHx CAD s/p CABG, HF (combined HFrEF [EF 25%] and HFpEF [G3DD]) w/ ICD, AFib on Xarelto, severe AS s/p TAVR, COPD on 3-4L home CKD T2DM
AoC HF   DENISHA  CAD   chronic AFIB   COPD
Dx: Acute on chronic HFrEF, LUE DVT cont. Xarelto
80 y/o male w/ PMHx CAD s/p CABG, HF (combined HFrEF [EF 25%] and HFpEF [G3DD]) w/ ICD, AFib on Xarelto, severe AS s/p TAVR, COPD on 3-4L home O2.
admitted for  HF
heart failure
pt admitted for extensive ascites
78 y/o male w/ PMHx CAD s/p CABG, HF (combined HFrEF [EF 25%] and HFpEF [G3DD]) w/ ICD, AFib on Xarelto, severe AS s/p TAVR, COPD on 3-4L home O2, CKD4, HTN, type 2 DM
78 y/o male w/ PMHx CAD s/p CABG, HF (combined HFrEF [EF 25%]s/p bumex gtt and HFpEF [G3DD]) w/ ICD, AFib on Xarelto, severe AS s/p TAVR, COPD on 3-4L home O2, CKD4, HTN, HLD, T2DM
Pt admitted for Heart failure
Pt is admitted for heart failure.

## 2025-04-03 NOTE — PROVIDER CONTACT NOTE (OTHER) - RECOMMENDATIONS
NC humidification and NS nasal spray.
Notify the provider.
NP to evaluate
Plan of care ongoing.
Notify the provider.
Provider notified and aware
Push meds for later
Notify the provider
Provider notified.
Notify the provider and monitor CBC.
notify provider, review orders and meds

## 2025-04-03 NOTE — PROVIDER CONTACT NOTE (OTHER) - ACTION/TREATMENT ORDERED:
Continue to monitor.
ELIZABETH Jean-Baptiste informed and aware. Bmp, MG, Phos ordered stat
Provider aware. stated " continue to monitor and if clots inform her" . No further actions being done at this time
Continue to monitor on telemetry.
Meds was rescheduled per provider Kaylee JOHNSON.
PA ordered EKG
Continue to monitor
NNO, draw AM labs as scheduled.
No new orders at this time.
Acetaminophen IV x 1
JAYDEN Jean-Baptiste informed and aware. Assessed patient at the bedside.  CBC ordered stat. Flu PCR sent .PRN Albuterol administered
Provider ordered STAT CBC, PT/INR, aPTT and chest x-ray.
NNO, continue to monitor
provider aware. EKG done, labs ordered and done. to f/u labs for plan of care.
ELIZABETH RUIZ informed and aware. Assessed patient at the bedside . Phos added to AM labs ( BMP ,MG)

## 2025-04-03 NOTE — PROGRESS NOTE ADULT - SUBJECTIVE AND OBJECTIVE BOX
Patient is a 79y old  Male who presents with a chief complaint of HF eval (02 Apr 2025 21:32)      SUBJECTIVE / OVERNIGHT EVENTS: overnight events noted pt on bipap currently     MEDICATIONS  (STANDING):  allopurinol 100 milliGRAM(s) Oral daily  apixaban 2.5 milliGRAM(s) Oral two times a day  aspirin enteric coated 81 milliGRAM(s) Oral daily  atorvastatin 20 milliGRAM(s) Oral at bedtime  calcium acetate 667 milliGRAM(s) Oral three times a day with meals  chlorhexidine 2% Cloths 1 Application(s) Topical daily  chlorhexidine 4% Liquid 1 Application(s) Topical <User Schedule>  dextrose 5%. 1000 milliLiter(s) (50 mL/Hr) IV Continuous <Continuous>  dextrose 5%. 1000 milliLiter(s) (100 mL/Hr) IV Continuous <Continuous>  dextrose 50% Injectable 25 Gram(s) IV Push once  dextrose 50% Injectable 12.5 Gram(s) IV Push once  dextrose 50% Injectable 25 Gram(s) IV Push once  epoetin lashawn (EPOGEN) Injectable 29653 Unit(s) IV Push <User Schedule>  fluticasone propionate/ salmeterol 250-50 MICROgram(s) Diskus 1 Dose(s) Inhalation two times a day  folic acid 1 milliGRAM(s) Oral daily  glucagon  Injectable 1 milliGRAM(s) IntraMuscular once  insulin glargine Injectable (LANTUS) 18 Unit(s) SubCutaneous at bedtime  insulin lispro (ADMELOG) corrective regimen sliding scale   SubCutaneous three times a day before meals  insulin lispro Injectable (ADMELOG) 3 Unit(s) SubCutaneous three times a day before meals  metoprolol succinate ER 25 milliGRAM(s) Oral daily  pantoprazole    Tablet 40 milliGRAM(s) Oral every 12 hours  polyethylene glycol 3350 17 Gram(s) Oral two times a day  psyllium Powder 1 Packet(s) Oral daily  sodium chloride 0.65% Nasal 1 Spray(s) Both Nostrils two times a day  tamsulosin 0.4 milliGRAM(s) Oral at bedtime  tiotropium 2.5 MICROgram(s) Inhaler 2 Puff(s) Inhalation daily    MEDICATIONS  (PRN):  acetaminophen     Tablet .. 650 milliGRAM(s) Oral every 6 hours PRN Temp greater or equal to 38C (100.4F), Mild Pain (1 - 3)  albuterol/ipratropium for Nebulization 3 milliLiter(s) Nebulizer every 6 hours PRN Shortness of Breath and/or Wheezing  bisacodyl 5 milliGRAM(s) Oral every 12 hours PRN Constipation  dextrose Oral Gel 15 Gram(s) Oral once PRN Blood Glucose LESS THAN 70 milliGRAM(s)/deciliter  dextrose Oral Gel 15 Gram(s) Oral once PRN Blood Glucose LESS THAN 70 milliGRAM(s)/deciliter  fentaNYL    Injectable 25 MICROGram(s) IV Push every 5 minutes PRN Moderate Pain (4 - 6)  melatonin 3 milliGRAM(s) Oral at bedtime PRN Insomnia  ondansetron Injectable 4 milliGRAM(s) IV Push once PRN Nausea and/or Vomiting  ondansetron Injectable 4 milliGRAM(s) IV Push every 6 hours PRN Nausea and/or Vomiting  oxyCODONE    IR 5 milliGRAM(s) Oral once PRN Moderate Pain (4 - 6)  sodium chloride 0.9% lock flush 10 milliLiter(s) IV Push every 1 hour PRN Pre/post blood products, medications, blood draw, and to maintain line patency        CAPILLARY BLOOD GLUCOSE      POCT Blood Glucose.: 218 mg/dL (03 Apr 2025 12:47)  POCT Blood Glucose.: 186 mg/dL (03 Apr 2025 08:30)  POCT Blood Glucose.: 177 mg/dL (02 Apr 2025 21:35)  POCT Blood Glucose.: 182 mg/dL (02 Apr 2025 17:51)    I&O's Summary    02 Apr 2025 07:01  -  03 Apr 2025 07:00  --------------------------------------------------------  IN: 540 mL / OUT: 1000 mL / NET: -460 mL        PHYSICAL EXAM:  GENERAL: NAD, obese   HEAD:  Atraumatic, Normocephalic  NECK: Supple  CHEST/LUNG: Clear to auscultation bilaterally; No wheeze  HEART: Regular rate and rhythm; S1S2  ABDOMEN: Soft, Nontender, distended; Bowel sounds present  EXTREMITIES:  anasarca     LABS:                        10.7   11.30 )-----------( 122      ( 02 Apr 2025 10:25 )             37.6     04-03    133[L]  |  96  |  45[H]  ----------------------------<  150[H]  4.4   |  25  |  4.71[H]    Ca    8.5      03 Apr 2025 04:41  Phos  5.6     04-03  Mg     2.4     04-03    TPro  7.2  /  Alb  3.0[L]  /  TBili  0.6  /  DBili  x   /  AST  30  /  ALT  <5[L]  /  AlkPhos  231[H]  04-03          Urinalysis Basic - ( 03 Apr 2025 04:41 )    Color: x / Appearance: x / SG: x / pH: x  Gluc: 150 mg/dL / Ketone: x  / Bili: x / Urobili: x   Blood: x / Protein: x / Nitrite: x   Leuk Esterase: x / RBC: x / WBC x   Sq Epi: x / Non Sq Epi: x / Bacteria: x        RADIOLOGY & ADDITIONAL TESTS:    Imaging Personally Reviewed:    Consultant(s) Notes Reviewed:      Care Discussed with Consultants/Other Providers:

## 2025-04-03 NOTE — PROVIDER CONTACT NOTE (OTHER) - ASSESSMENT
Blood seems to have a clot in it. VSS.
HR 130s, pt asleep and asymptomatic
Pt is A&o 4 , asymptomatic . He denies any chest pain , palpitations , difficulty breathing,  BP:128/70  HR:69 O2: 95%
Pt asymptomatic. Pt had 31 beats overnight.
Pt. AOx4, Vital signs stable /65, HR 71  denies cp, sob and palpitations. +2 B/L radial pulses w/ normal capillary refill <2 seconds.
patient asymptomatic, VS WNL
AOx4 OOB with assistance. No signs of pain or discomfort noted. VVS. /69 HR 69 sating 99 on 3L NC .
Pt A&O x3. 3L NC. Pt asymptomatic. No chest pain or palpations. Pt was being change at the time of event.     Vitals   /67  HR 71   Oxygen 96 (3L NC)
Pt denies any pain   BP: 121/62 HR:70 Oxygen: 96% Temp:98
Pt. anxious, spO2 93% on O2 4 LNC. /60 and VPaced
VSS. Pt asymptomatic and states he is okay
pt at baseline AO3-4, alert and follows commands. Upon assessment, pt noted to be very lethargic on exam, has eye opening but falls back asleep; not answering questions or following commands. Vitals otherwise stable. has order for bipap HS, however remained on 3L NC overnight due to vomiting overnight and concern for aspiration
Pt has multiple BP meds scheduled they were spaced out to not significantly lower his BP. Vitals rechecked to give metoprolol but SP did not meet parameter twice it was 95/57 and rechecked 94/57.
RN at bedside during event, patient lying comfortably without complaints. denies pain SOB or palpitations.
Pt is asymptomatic. He denies any chest pain and discomfort.   BP: 124/77 HR: 70 Oxygen: 99% Temp:96.6
Nosebleed stopped. VS stable.

## 2025-04-03 NOTE — PROVIDER CONTACT NOTE (OTHER) - SITUATION
31 beats WCT
340W. Arun Solares. Episode of coughing blood.
8 WCT
blood tinged urine via urethral Cath
Heart Failure patient.
pt had 15 wct on tele. not first time event, has hx of wct on tele.
LUE swelling
HR 130s
Pt had 20 beats WCT for 7.7 secs with a HR of 172
Pt had 5 WCT twice with HR of 159
Pacemaker mediating atrial tachycardia 150s for 9 seconds
Pt is coughing up bloody mucus with a nose bleed .
Pt is spitting out sputum that is bright red.
Pt. anxious, c/o "head pain" and sob
40W. Arun Solares. 1x episode of nosebleed.

## 2025-04-03 NOTE — PROVIDER CONTACT NOTE (OTHER) - REASON
15 wct on tele
8 WCT
Pt is experiencing congestion and a  nose bleed
headache, sob and anxiety
Blood in Sputum
Nosebleed
Pacemaker mediating atrial tachycardia 150s for 9 seconds
Pt had 20 beats WCT
Pt has 10 beats WCT
Coughed Blood
31 beats WCT
HR 130s
LUE swelling
Pt had 5 WCT twice
blood tinged urine via urethral Cath
BP med held

## 2025-04-03 NOTE — PROGRESS NOTE ADULT - ASSESSMENT
Assessment/plan:    Bilateral dorsal foot bulla: Stable, noninfected, present on admission  Diabetes mellitus    Aseptic expression/drainage of remaining fluid from bilateral dorsal first MPJ bulla sites.  Recommend Betadine and DSD to bilateral bulla sites daily.  Will continue to monitor for signs of infection and/or wound care recommendations. I have personally seen and examined this patient. I have fully participated in the care of this patient. I have reviewed all pertinent clinical information, including history physical exam, plan and the Resident's note and agree except as noted

## 2025-04-03 NOTE — PROGRESS NOTE ADULT - PROBLEM SELECTOR PLAN 1
- Continue monitoring Cr and urine output.   - now on HD  - c/w bipap  - s/p tunneled HD catheter on 3/28  - renal follow up

## 2025-04-03 NOTE — PROVIDER CONTACT NOTE (CRITICAL VALUE NOTIFICATION) - ACTION/TREATMENT ORDERED:
JAYDEN Zendejas informed and aware, will continue to keep patient on BiPaP and monitor for improvement.

## 2025-04-04 DIAGNOSIS — J96.21 ACUTE AND CHRONIC RESPIRATORY FAILURE WITH HYPOXIA: ICD-10-CM

## 2025-04-04 DIAGNOSIS — I50.9 HEART FAILURE, UNSPECIFIED: ICD-10-CM

## 2025-04-04 DIAGNOSIS — N17.9 ACUTE KIDNEY FAILURE, UNSPECIFIED: ICD-10-CM

## 2025-04-04 DIAGNOSIS — G47.33 OBSTRUCTIVE SLEEP APNEA (ADULT) (PEDIATRIC): ICD-10-CM

## 2025-04-04 DIAGNOSIS — J44.9 CHRONIC OBSTRUCTIVE PULMONARY DISEASE, UNSPECIFIED: ICD-10-CM

## 2025-04-04 LAB
ALBUMIN SERPL ELPH-MCNC: 2.9 G/DL — LOW (ref 3.3–5)
ALP SERPL-CCNC: 232 U/L — HIGH (ref 40–120)
ALT FLD-CCNC: 5 U/L — LOW (ref 10–45)
ANION GAP SERPL CALC-SCNC: 16 MMOL/L — SIGNIFICANT CHANGE UP (ref 5–17)
AST SERPL-CCNC: 41 U/L — HIGH (ref 10–40)
BILIRUB SERPL-MCNC: 0.6 MG/DL — SIGNIFICANT CHANGE UP (ref 0.2–1.2)
BUN SERPL-MCNC: 52 MG/DL — HIGH (ref 7–23)
CALCIUM SERPL-MCNC: 8.3 MG/DL — LOW (ref 8.4–10.5)
CHLORIDE SERPL-SCNC: 95 MMOL/L — LOW (ref 96–108)
CO2 SERPL-SCNC: 21 MMOL/L — LOW (ref 22–31)
CREAT SERPL-MCNC: 5.83 MG/DL — HIGH (ref 0.5–1.3)
CULTURE RESULTS: SIGNIFICANT CHANGE UP
EGFR: 9 ML/MIN/1.73M2 — LOW
EGFR: 9 ML/MIN/1.73M2 — LOW
GAS PNL BLDV: SIGNIFICANT CHANGE UP
GLUCOSE BLDC GLUCOMTR-MCNC: 103 MG/DL — HIGH (ref 70–99)
GLUCOSE BLDC GLUCOMTR-MCNC: 127 MG/DL — HIGH (ref 70–99)
GLUCOSE BLDC GLUCOMTR-MCNC: 139 MG/DL — HIGH (ref 70–99)
GLUCOSE BLDC GLUCOMTR-MCNC: 152 MG/DL — HIGH (ref 70–99)
GLUCOSE BLDC GLUCOMTR-MCNC: 99 MG/DL — SIGNIFICANT CHANGE UP (ref 70–99)
GLUCOSE SERPL-MCNC: 130 MG/DL — HIGH (ref 70–99)
HCT VFR BLD CALC: 36.5 % — LOW (ref 39–50)
HGB BLD-MCNC: 10.5 G/DL — LOW (ref 13–17)
MCHC RBC-ENTMCNC: 26 PG — LOW (ref 27–34)
MCHC RBC-ENTMCNC: 28.8 G/DL — LOW (ref 32–36)
MCV RBC AUTO: 90.3 FL — SIGNIFICANT CHANGE UP (ref 80–100)
NRBC BLD AUTO-RTO: 2 /100 WBCS — HIGH (ref 0–0)
PLATELET # BLD AUTO: 97 K/UL — LOW (ref 150–400)
POTASSIUM SERPL-MCNC: 4.9 MMOL/L — SIGNIFICANT CHANGE UP (ref 3.5–5.3)
POTASSIUM SERPL-SCNC: 4.9 MMOL/L — SIGNIFICANT CHANGE UP (ref 3.5–5.3)
PROT SERPL-MCNC: 7.1 G/DL — SIGNIFICANT CHANGE UP (ref 6–8.3)
RBC # BLD: 4.04 M/UL — LOW (ref 4.2–5.8)
RBC # FLD: 18.7 % — HIGH (ref 10.3–14.5)
SODIUM SERPL-SCNC: 132 MMOL/L — LOW (ref 135–145)
SPECIMEN SOURCE: SIGNIFICANT CHANGE UP
TROPONIN T, HIGH SENSITIVITY RESULT: 218 NG/L — HIGH (ref 0–51)
WBC # BLD: 11.3 K/UL — HIGH (ref 3.8–10.5)
WBC # FLD AUTO: 11.3 K/UL — HIGH (ref 3.8–10.5)

## 2025-04-04 PROCEDURE — 99233 SBSQ HOSP IP/OBS HIGH 50: CPT

## 2025-04-04 PROCEDURE — 93010 ELECTROCARDIOGRAM REPORT: CPT

## 2025-04-04 RX ORDER — MAGNESIUM, ALUMINUM HYDROXIDE 200-200 MG
30 TABLET,CHEWABLE ORAL ONCE
Refills: 0 | Status: COMPLETED | OUTPATIENT
Start: 2025-04-04 | End: 2025-04-04

## 2025-04-04 RX ADMIN — INSULIN LISPRO 3 UNIT(S): 100 INJECTION, SOLUTION INTRAVENOUS; SUBCUTANEOUS at 17:30

## 2025-04-04 RX ADMIN — POLYETHYLENE GLYCOL 3350 17 GRAM(S): 17 POWDER, FOR SOLUTION ORAL at 19:14

## 2025-04-04 RX ADMIN — Medication 667 MILLIGRAM(S): at 19:12

## 2025-04-04 RX ADMIN — Medication 30 MILLILITER(S): at 20:07

## 2025-04-04 RX ADMIN — ATORVASTATIN CALCIUM 20 MILLIGRAM(S): 80 TABLET, FILM COATED ORAL at 21:30

## 2025-04-04 RX ADMIN — Medication 1 PACKET(S): at 21:30

## 2025-04-04 RX ADMIN — Medication 1 APPLICATION(S): at 13:34

## 2025-04-04 RX ADMIN — Medication 81 MILLIGRAM(S): at 13:34

## 2025-04-04 RX ADMIN — Medication 667 MILLIGRAM(S): at 13:30

## 2025-04-04 RX ADMIN — Medication 100 MILLIGRAM(S): at 13:33

## 2025-04-04 RX ADMIN — Medication 1 DOSE(S): at 19:12

## 2025-04-04 RX ADMIN — Medication 40 MILLIGRAM(S): at 19:13

## 2025-04-04 RX ADMIN — BUMETANIDE 2 MILLIGRAM(S): 1 TABLET ORAL at 19:15

## 2025-04-04 RX ADMIN — Medication 1 APPLICATION(S): at 06:22

## 2025-04-04 RX ADMIN — FOLIC ACID 1 MILLIGRAM(S): 1 TABLET ORAL at 13:32

## 2025-04-04 RX ADMIN — TIOTROPIUM BROMIDE INHALATION SPRAY 2 PUFF(S): 3.12 SPRAY, METERED RESPIRATORY (INHALATION) at 19:15

## 2025-04-04 RX ADMIN — Medication 1 SPRAY(S): at 19:13

## 2025-04-04 RX ADMIN — INSULIN GLARGINE-YFGN 18 UNIT(S): 100 INJECTION, SOLUTION SUBCUTANEOUS at 21:30

## 2025-04-04 RX ADMIN — INSULIN LISPRO 3 UNIT(S): 100 INJECTION, SOLUTION INTRAVENOUS; SUBCUTANEOUS at 13:31

## 2025-04-04 RX ADMIN — APIXABAN 2.5 MILLIGRAM(S): 2.5 TABLET, FILM COATED ORAL at 19:14

## 2025-04-04 RX ADMIN — EPOETIN ALFA 10000 UNIT(S): 10000 SOLUTION INTRAVENOUS; SUBCUTANEOUS at 12:28

## 2025-04-04 RX ADMIN — TAMSULOSIN HYDROCHLORIDE 0.4 MILLIGRAM(S): 0.4 CAPSULE ORAL at 21:30

## 2025-04-04 NOTE — CONSULT NOTE ADULT - CONSULT REQUESTED DATE/TIME
06-Feb-2025 17:50
15-Mar-2025 11:15
21-Mar-2025 13:43
25-Mar-2025 15:13
06-Feb-2025 13:24
30-Mar-2025 06:34
04-Apr-2025 10:34
25-Mar-2025 13:15
13-Mar-2025 14:29
11-Feb-2025 11:23

## 2025-04-04 NOTE — CONSULT NOTE ADULT - ASSESSMENT
78 y/o M with PMH of CAD s/p CABG, HF (combined HFrEF [EF 25%] and HFpEF [G3DD]) w/ ICD, AFib on Xarelto, severe AS s/p TAVR, COPD on 3-4L home O2, CKD3, HTN, HLD, T2DM, and BPH who presents as a transfer from [facility] for HF evaluation. He presented to [place] on 1/10/25 for worsening of his chronic SOB for 2 weeks, with associated increased LE edema and abdominal distention - admitted for acute on chronic hypoxic respiratory failure, treated with IV diuresis & Bipap. Tx to [facility] for further evaluation by HF team. Prolonged hospital course for CHF exacerbation, DENISHA on CKD now requiring HD, RRT/Code stroke 3/30 for lethargy and facial droop, CT head negative. Pulmonary called to consult for acute on chronic hypoxic/hypercapnic respiratory failure requiring Bipap.

## 2025-04-04 NOTE — CONSULT NOTE ADULT - PROBLEM SELECTOR RECOMMENDATION 4
see GOC note
-TTE with severely decreased LV systolic function, mod MR, severely enlarge RV cavity with reduced RV systolic function, severe TR, s/p TAVR with trace intervalvular regurgitation  -Pro-BNP elevated, pt volume overloaded on exam (now improving)  -Keep O>I as tolerated  -Cards/heart failure f/u.

## 2025-04-04 NOTE — CONSULT NOTE ADULT - PROVIDER SPECIALTY LIST ADULT
Intervent Radiology
Intervent Radiology
Infectious Disease
Intervent Radiology
Pulmonology
Heart Failure
Neurology
Intervent Radiology
Palliative Care

## 2025-04-04 NOTE — CONSULT NOTE ADULT - PROBLEM SELECTOR RECOMMENDATION 9
-2nd to underlying COPD/EMBER, pt on CPAP at home. Escalated to Bipap during this admission  -Likely exacerbated in the setting of CHF, volume overload  -CXR with congestion, L effusion/atelectasis  -Keep O>I with HD, diuresis as tolerated  -Continue bronchodilators  -VBG this AM (arterial sample) acceptable, reflective of Bipap 18/7/30%. Continue for now, pt will likely require NIV on discharge  -Check bedside spirometry  -Keep sats >90% with O2, currently at baseline O2 requirements. -2nd to underlying COPD/EMBER, pt on CPAP at home. Escalated to Bipap during this admission  -Likely exacerbated in the setting of CHF, volume overload  -CXR with congestion, L effusion/atelectasis  -Keep O>I with HD, diuresis as tolerated  -Continue bronchodilators  -VBG this AM (arterial sample) acceptable, reflective of Bipap 18/7/30%. Continue for now  -Check ABG in AM  -Check bedside spirometry  -Keep sats >90% with O2, currently at baseline O2 requirements  -Will likely need Bipap vs NIV at home.

## 2025-04-04 NOTE — CONSULT NOTE ADULT - PROBLEM SELECTOR PROBLEM 2
CAD (coronary artery disease)
COPD (chronic obstructive pulmonary disease)
Chronic hypoxic respiratory failure

## 2025-04-04 NOTE — CHART NOTE - NSCHARTNOTEFT_GEN_A_CORE
HPI:  80 yo M PMHx CAD s/p CABG, HF (combined HFrEF [EF 25%] and HFpEF [G3DD]) w/ ICD, AFib on Xarelto, severe AS s/p TAVR, COPD on 3-4L home O2, CKD4, HTN, HLD, T2DM, and BPH p/w  transfer from [facility] for HF evaluation.    CC: R sided chest pain      #CP  Received call from RN that pt c/o R sided chest pain. VSS- /71, HR 78, T 97.8. EKG and trop ordered stat, EKG with paced rhythm, trop still pend in lab. Pt seen at bedside, not appearing in acute distress, denying any other symptoms including dizziness, diaphoresis, shortness of breath, radiating pain, etc. Collateral obtained from bedside HHA who reported pain started after dinner that he ate sitting in bed and pain started shortly after. R side chest wall palpated and pain reproducible per pt. Pt ordered for maalox x1. Endorsed to oncoming shift for further monitoring.           Manda Barrow, ASHVIN Patient last seen 06/29/2020. Patient to continue current medical therapy per Dr. Cabral. Prescription refill sent to Dr. Cabral.    Patient completed Lipid panel 12/07/2020 and CMP labs on 08/06/2020.

## 2025-04-04 NOTE — CONSULT NOTE ADULT - NS ATTEND AMEND GEN_ALL_CORE FT
Mr. Solares 79/M with h/o HFrEF ef 25%, ICM, A.fib, s/p ICD, CKD 3, admitted with ADHF to [facility] and was on bumex drip/lasix drip with persistent volume overload so transferred to [facility] for further management.     exam: JVP 14, b/l LE edema    Plan:  will get CXR and TTE  will replete potassium > 4 and Mg>2  will start Bumex drip  BID labs   will add hydral + isordil for afterload    pt wife at bedside, reports he might have missed doses of his Lasix at home  has had few HF hospitalization  will get palliative care
Continue Advair, Spriva  Suggest change Duoneb to q6h standing  Keep sats >90% with O2  Continue bipap qhs and prn

## 2025-04-04 NOTE — CONSULT NOTE ADULT - PROBLEM SELECTOR RECOMMENDATION 2
on O2 via NC  c/w management as per pulm and IM
-By hx  -On home O2 baseline 3LNC (sometimes 4LNC with exertion).   -Continue Advair, Spriva  -Suggest change Duoneb to q6h standing  -Keep sats >90% with O2, currently at baseline O2 requirements
s/p CABG follows with [place] team  - On statin and BB

## 2025-04-04 NOTE — PROGRESS NOTE ADULT - SUBJECTIVE AND OBJECTIVE BOX
On NC O2, MS at baseline, s/p stable HD today     Vital Signs Last 24 Hrs  T(C): 36.6 (04-04-25 @ 13:12), Max: 37.1 (04-03-25 @ 20:35)  T(F): 97.9 (04-04-25 @ 13:12), Max: 98.7 (04-03-25 @ 20:35)  HR: 78 (04-04-25 @ 13:58) (68 - 78)  BP: 104/64 (04-04-25 @ 13:12) (91/62 - 114/61)  RR: 18 (04-04-25 @ 13:12) (18 - 18)  SpO2: 96% (04-04-25 @ 13:58) (94% - 100%)    I&O's Detail    04 Apr 2025 07:01  -  04 Apr 2025 17:27  --------------------------------------------------------  OUT:    Other (mL): 2000 mL  Total OUT: 2000 mL    s1s2  b/l air entry  soft, ascites   edema                                                                                                                                                                                             10.5   11.30 )-----------( 97       ( 04 Apr 2025 07:03 )             36.5     04 Apr 2025 07:03    132    |  95     |  52     ----------------------------<  130    4.9     |  21     |  5.83     Ca    8.3        04 Apr 2025 07:03  Phos  5.6       03 Apr 2025 04:41  Mg     2.4       03 Apr 2025 04:41    TPro  7.1    /  Alb  2.9    /  TBili  0.6    /  DBili  x      /  AST  41     /  ALT  5      /  AlkPhos  232    04 Apr 2025 07:03    LIVER FUNCTIONS - ( 04 Apr 2025 07:03 )  Alb: 2.9 g/dL / Pro: 7.1 g/dL / ALK PHOS: 232 U/L / ALT: 5 U/L / AST: 41 U/L / GGT: x           acetaminophen     Tablet .. 650 milliGRAM(s) Oral every 6 hours PRN  albuterol/ipratropium for Nebulization 3 milliLiter(s) Nebulizer every 6 hours PRN  allopurinol 100 milliGRAM(s) Oral daily  apixaban 2.5 milliGRAM(s) Oral two times a day  aspirin enteric coated 81 milliGRAM(s) Oral daily  atorvastatin 20 milliGRAM(s) Oral at bedtime  bisacodyl 5 milliGRAM(s) Oral every 12 hours PRN  buMETAnide Injectable 2 milliGRAM(s) IV Push every 12 hours  calcium acetate 667 milliGRAM(s) Oral three times a day with meals  chlorhexidine 2% Cloths 1 Application(s) Topical daily  chlorhexidine 4% Liquid 1 Application(s) Topical <User Schedule>  dextrose 5%. 1000 milliLiter(s) IV Continuous <Continuous>  dextrose 5%. 1000 milliLiter(s) IV Continuous <Continuous>  dextrose 50% Injectable 25 Gram(s) IV Push once  dextrose 50% Injectable 12.5 Gram(s) IV Push once  dextrose 50% Injectable 25 Gram(s) IV Push once  dextrose Oral Gel 15 Gram(s) Oral once PRN  dextrose Oral Gel 15 Gram(s) Oral once PRN  epoetin lashawn (EPOGEN) Injectable 38602 Unit(s) IV Push <User Schedule>  fluticasone propionate/ salmeterol 250-50 MICROgram(s) Diskus 1 Dose(s) Inhalation two times a day  folic acid 1 milliGRAM(s) Oral daily  glucagon  Injectable 1 milliGRAM(s) IntraMuscular once  insulin glargine Injectable (LANTUS) 18 Unit(s) SubCutaneous at bedtime  insulin lispro (ADMELOG) corrective regimen sliding scale   SubCutaneous three times a day before meals  insulin lispro Injectable (ADMELOG) 3 Unit(s) SubCutaneous three times a day before meals  melatonin 3 milliGRAM(s) Oral at bedtime PRN  metoprolol succinate ER 25 milliGRAM(s) Oral daily  ondansetron Injectable 4 milliGRAM(s) IV Push once PRN  ondansetron Injectable 4 milliGRAM(s) IV Push every 6 hours PRN  pantoprazole    Tablet 40 milliGRAM(s) Oral every 12 hours  polyethylene glycol 3350 17 Gram(s) Oral two times a day  psyllium Powder 1 Packet(s) Oral daily  sodium chloride 0.65% Nasal 1 Spray(s) Both Nostrils two times a day  sodium chloride 0.9% lock flush 10 milliLiter(s) IV Push every 1 hour PRN  tamsulosin 0.4 milliGRAM(s) Oral at bedtime  tiotropium 2.5 MICROgram(s) Inhaler 2 Puff(s) Inhalation daily    A/P:    COPD, on home O2  CM, EF ~ 30%, severe TR  Adm to [facility] 1/10/25 w/fluid overload   Tx to [facility] 2/5/25 for HF eval    S/p LVP 3/14 w/4.5L fluid removal, s/p SPA   Dropped Hgb to 5.3 on 3/15, s/p PRBCs   Hemodynamic/cardio-renal DENISHA/CKD   Started on HD 3/24 via temp HD cath  S/p perm cath 3/28  HD MWF  TMP 2kg as tolerates w/HD   Arrangements for [facility] w/HD in progress  D/w family at bedside     [phone]

## 2025-04-04 NOTE — PROGRESS NOTE ADULT - ASSESSMENT
80 y/o male w/ PMHx CAD s/p CABG, HF (combined HFrEF [EF 25%] and HFpEF [G3DD]) w/ ICD, AFib on Xarelto, severe AS s/p TAVR, COPD on 3-4L home O2, CKD4, HTN, HLD, T2DM, and BPH who presents as a transfer from [facility] for HF evaluation. Ongoing CHF exacerbation now off bumex gtt, new DENISHA on CKD likely 2/2 ongoing cardiorenal and possible component of abdominal compartment syndrome given tremendous ascites, now s/p tunnelled HD catheter.  RRT/Code Stroke on 3/30/25 for lethargy and facial droop, CT negative, likely in the setting of hypercarbia, placed on bipap with improvement.

## 2025-04-04 NOTE — CONSULT NOTE ADULT - CONSULT REASON
Leonieey placement for HD
Paracentesis
code stroke
post paracentesis bleeding
ADHF
DENISHA/CKD
clearance
non-tunneled to tunneled hd catheter placement
goc
COPD/EMBER

## 2025-04-04 NOTE — CONSULT NOTE ADULT - PROBLEM SELECTOR PROBLEM 1
Acute on chronic heart failure with reduced ejection fraction (HFrEF, <= 40%)
Acute on chronic heart failure with reduced ejection fraction (HFrEF, <= 40%)
Acute on chronic respiratory failure with hypoxia and hypercapnia

## 2025-04-04 NOTE — PROGRESS NOTE ADULT - SUBJECTIVE AND OBJECTIVE BOX
Patient is a 79y old  Male who presents with a chief complaint of HF eval (04 Apr 2025 10:34)      SUBJECTIVE / OVERNIGHT EVENTS: pt appears comfortable     MEDICATIONS  (STANDING):  allopurinol 100 milliGRAM(s) Oral daily  apixaban 2.5 milliGRAM(s) Oral two times a day  aspirin enteric coated 81 milliGRAM(s) Oral daily  atorvastatin 20 milliGRAM(s) Oral at bedtime  buMETAnide Injectable 2 milliGRAM(s) IV Push every 12 hours  calcium acetate 667 milliGRAM(s) Oral three times a day with meals  chlorhexidine 2% Cloths 1 Application(s) Topical daily  chlorhexidine 4% Liquid 1 Application(s) Topical <User Schedule>  dextrose 5%. 1000 milliLiter(s) (100 mL/Hr) IV Continuous <Continuous>  dextrose 5%. 1000 milliLiter(s) (50 mL/Hr) IV Continuous <Continuous>  dextrose 50% Injectable 25 Gram(s) IV Push once  dextrose 50% Injectable 12.5 Gram(s) IV Push once  dextrose 50% Injectable 25 Gram(s) IV Push once  epoetin lashawn (EPOGEN) Injectable 14206 Unit(s) IV Push <User Schedule>  fluticasone propionate/ salmeterol 250-50 MICROgram(s) Diskus 1 Dose(s) Inhalation two times a day  folic acid 1 milliGRAM(s) Oral daily  glucagon  Injectable 1 milliGRAM(s) IntraMuscular once  insulin glargine Injectable (LANTUS) 18 Unit(s) SubCutaneous at bedtime  insulin lispro (ADMELOG) corrective regimen sliding scale   SubCutaneous three times a day before meals  insulin lispro Injectable (ADMELOG) 3 Unit(s) SubCutaneous three times a day before meals  metoprolol succinate ER 25 milliGRAM(s) Oral daily  pantoprazole    Tablet 40 milliGRAM(s) Oral every 12 hours  polyethylene glycol 3350 17 Gram(s) Oral two times a day  psyllium Powder 1 Packet(s) Oral daily  sodium chloride 0.65% Nasal 1 Spray(s) Both Nostrils two times a day  tamsulosin 0.4 milliGRAM(s) Oral at bedtime  tiotropium 2.5 MICROgram(s) Inhaler 2 Puff(s) Inhalation daily    MEDICATIONS  (PRN):  acetaminophen     Tablet .. 650 milliGRAM(s) Oral every 6 hours PRN Temp greater or equal to 38C (100.4F), Mild Pain (1 - 3)  albuterol/ipratropium for Nebulization 3 milliLiter(s) Nebulizer every 6 hours PRN Shortness of Breath and/or Wheezing  bisacodyl 5 milliGRAM(s) Oral every 12 hours PRN Constipation  dextrose Oral Gel 15 Gram(s) Oral once PRN Blood Glucose LESS THAN 70 milliGRAM(s)/deciliter  dextrose Oral Gel 15 Gram(s) Oral once PRN Blood Glucose LESS THAN 70 milliGRAM(s)/deciliter  melatonin 3 milliGRAM(s) Oral at bedtime PRN Insomnia  ondansetron Injectable 4 milliGRAM(s) IV Push once PRN Nausea and/or Vomiting  ondansetron Injectable 4 milliGRAM(s) IV Push every 6 hours PRN Nausea and/or Vomiting  sodium chloride 0.9% lock flush 10 milliLiter(s) IV Push every 1 hour PRN Pre/post blood products, medications, blood draw, and to maintain line patency        CAPILLARY BLOOD GLUCOSE      POCT Blood Glucose.: 99 mg/dL (04 Apr 2025 13:09)  POCT Blood Glucose.: 127 mg/dL (04 Apr 2025 08:31)  POCT Blood Glucose.: 139 mg/dL (04 Apr 2025 06:53)  POCT Blood Glucose.: 113 mg/dL (03 Apr 2025 21:55)  POCT Blood Glucose.: 127 mg/dL (03 Apr 2025 17:25)    I&O's Summary    03 Apr 2025 07:01  -  04 Apr 2025 07:00  --------------------------------------------------------  IN: 120 mL / OUT: 0 mL / NET: 120 mL    04 Apr 2025 07:01  -  04 Apr 2025 13:49  --------------------------------------------------------  IN: 0 mL / OUT: 2000 mL / NET: -2000 mL        PHYSICAL EXAM:  GENERAL: NAD, obese   HEAD:  Atraumatic, Normocephalic  NECK: Supple  CHEST/LUNG: Clear to auscultation bilaterally; No wheeze  HEART: Regular rate and rhythm; S1S2  ABDOMEN: Soft, Nontender, distended; Bowel sounds present  EXTREMITIES:  anasarca     LABS:                        10.5   11.30 )-----------( 97       ( 04 Apr 2025 07:03 )             36.5     04-04    132[L]  |  95[L]  |  52[H]  ----------------------------<  130[H]  4.9   |  21[L]  |  5.83[H]    Ca    8.3[L]      04 Apr 2025 07:03  Phos  5.6     04-03  Mg     2.4     04-03    TPro  7.1  /  Alb  2.9[L]  /  TBili  0.6  /  DBili  x   /  AST  41[H]  /  ALT  5[L]  /  AlkPhos  232[H]  04-04          Urinalysis Basic - ( 04 Apr 2025 07:03 )    Color: x / Appearance: x / SG: x / pH: x  Gluc: 130 mg/dL / Ketone: x  / Bili: x / Urobili: x   Blood: x / Protein: x / Nitrite: x   Leuk Esterase: x / RBC: x / WBC x   Sq Epi: x / Non Sq Epi: x / Bacteria: x        RADIOLOGY & ADDITIONAL TESTS:    Imaging Personally Reviewed:    Consultant(s) Notes Reviewed:      Care Discussed with Consultants/Other Providers:

## 2025-04-04 NOTE — CONSULT NOTE ADULT - SUBJECTIVE AND OBJECTIVE BOX
PULMONARY CONSULT    HPI: 78 y/o M with PMH of CAD s/p CABG, HF (combined HFrEF [EF 25%] and HFpEF [G3DD]) w/ ICD, AFib on Xarelto, severe AS s/p TAVR, COPD on 3-4L home O2, CKD3, HTN, HLD, T2DM, and BPH who presents as a transfer from [facility] for HF evaluation. He presented to [place] on 1/10/25 for worsening of his chronic SOB for 2 weeks, with associated increased LE edema and abdominal distention - admitted for acute on chronic hypoxic respiratory failure, treated with IV diuresis & Bipap. Tx to [facility] for further evaluation by HF team. Prolonged hospital course for CHF exacerbation, DENISHA on CKD now requiring HD, RRT/Code stroke 3/30 for lethargy and facial droop, CT head negative. Pulmonary called to consult for acute on chronic hypoxic/hypercapnic respiratory failure requiring Bipap.           PAST MEDICAL & SURGICAL HISTORY:  HTN (hypertension)  COPD (chronic obstructive pulmonary disease)  HLD (hyperlipidemia)  Insulin dependent type 2 diabetes mellitus  Chronic hypoxic respiratory failure, on home oxygen therapy  Stage 4 chronic kidney disease  Chronic combined systolic and diastolic heart failure  Unspecified atrial fibrillation  S/P CABG x 3  S/P hernia repair  S/P implantation of automatic cardioverter/defibrillator (AICD)    No Known Allergies    FAMILY HISTORY:  FH: HTN (hypertension)    Social history: former smoker     Review of Systems:  CONSTITUTIONAL: No fever, chills, or fatigue  EYES: No eye pain, visual disturbances, or discharge  ENMT:  No difficulty hearing, tinnitus, vertigo; No sinus or throat pain  NECK: No pain or stiffness  RESPIRATORY: Per above  CARDIOVASCULAR: No chest pain, palpitations, dizziness, or leg swelling  GASTROINTESTINAL: No abdominal or epigastric pain. No nausea, vomiting, or hematemesis; No diarrhea or constipation. No melena or hematochezia.  GENITOURINARY: No dysuria, frequency, hematuria, or incontinence  NEUROLOGICAL: No headaches, memory loss, loss of strength, numbness, or tremors  SKIN: No itching, burning, rashes, or lesions   MUSCULOSKELETAL: No joint pain or swelling; No muscle, back, or extremity pain  PSYCHIATRIC: No depression, anxiety, mood swings, or difficulty sleeping      Medications:  MEDICATIONS  (STANDING):  allopurinol 100 milliGRAM(s) Oral daily  apixaban 2.5 milliGRAM(s) Oral two times a day  aspirin enteric coated 81 milliGRAM(s) Oral daily  atorvastatin 20 milliGRAM(s) Oral at bedtime  buMETAnide Injectable 2 milliGRAM(s) IV Push every 12 hours  calcium acetate 667 milliGRAM(s) Oral three times a day with meals  chlorhexidine 2% Cloths 1 Application(s) Topical daily  chlorhexidine 4% Liquid 1 Application(s) Topical <User Schedule>  dextrose 5%. 1000 milliLiter(s) (100 mL/Hr) IV Continuous <Continuous>  dextrose 5%. 1000 milliLiter(s) (50 mL/Hr) IV Continuous <Continuous>  dextrose 50% Injectable 25 Gram(s) IV Push once  dextrose 50% Injectable 12.5 Gram(s) IV Push once  dextrose 50% Injectable 25 Gram(s) IV Push once  epoetin lashawn (EPOGEN) Injectable 82669 Unit(s) IV Push <User Schedule>  fluticasone propionate/ salmeterol 250-50 MICROgram(s) Diskus 1 Dose(s) Inhalation two times a day  folic acid 1 milliGRAM(s) Oral daily  glucagon  Injectable 1 milliGRAM(s) IntraMuscular once  insulin glargine Injectable (LANTUS) 18 Unit(s) SubCutaneous at bedtime  insulin lispro (ADMELOG) corrective regimen sliding scale   SubCutaneous three times a day before meals  insulin lispro Injectable (ADMELOG) 3 Unit(s) SubCutaneous three times a day before meals  metoprolol succinate ER 25 milliGRAM(s) Oral daily  pantoprazole    Tablet 40 milliGRAM(s) Oral every 12 hours  polyethylene glycol 3350 17 Gram(s) Oral two times a day  psyllium Powder 1 Packet(s) Oral daily  sodium chloride 0.65% Nasal 1 Spray(s) Both Nostrils two times a day  tamsulosin 0.4 milliGRAM(s) Oral at bedtime  tiotropium 2.5 MICROgram(s) Inhaler 2 Puff(s) Inhalation daily    MEDICATIONS  (PRN):  acetaminophen     Tablet .. 650 milliGRAM(s) Oral every 6 hours PRN Temp greater or equal to 38C (100.4F), Mild Pain (1 - 3)  albuterol/ipratropium for Nebulization 3 milliLiter(s) Nebulizer every 6 hours PRN Shortness of Breath and/or Wheezing  bisacodyl 5 milliGRAM(s) Oral every 12 hours PRN Constipation  dextrose Oral Gel 15 Gram(s) Oral once PRN Blood Glucose LESS THAN 70 milliGRAM(s)/deciliter  dextrose Oral Gel 15 Gram(s) Oral once PRN Blood Glucose LESS THAN 70 milliGRAM(s)/deciliter  melatonin 3 milliGRAM(s) Oral at bedtime PRN Insomnia  ondansetron Injectable 4 milliGRAM(s) IV Push once PRN Nausea and/or Vomiting  ondansetron Injectable 4 milliGRAM(s) IV Push every 6 hours PRN Nausea and/or Vomiting  sodium chloride 0.9% lock flush 10 milliLiter(s) IV Push every 1 hour PRN Pre/post blood products, medications, blood draw, and to maintain line patency            Vital Signs Last 24 Hrs  T(C): 36.8 (04 Apr 2025 09:03), Max: 37.1 (03 Apr 2025 20:35)  T(F): 98.2 (04 Apr 2025 09:03), Max: 98.7 (03 Apr 2025 20:35)  HR: 68 (04 Apr 2025 09:03) (68 - 75)  BP: 114/61 (04 Apr 2025 09:03) (91/62 - 114/61)  BP(mean): 77 (03 Apr 2025 11:12) (77 - 77)  RR: 18 (04 Apr 2025 09:03) (17 - 18)  SpO2: 98% (04 Apr 2025 09:03) (94% - 100%)    Parameters below as of 04 Apr 2025 09:03  Patient On (Oxygen Delivery Method): nasal cannula  O2 Flow (L/min): 3      ABG - ( 03 Apr 2025 17:00 )  pH, Arterial: 7.30  pH, Blood: x     /  pCO2: 64    /  pO2: 75    / HCO3: 32    / Base Excess: 3.8   /  SaO2: 97.9              VBG pH 7.36 04-04 @ 07:04  VBG pCO2 48 04-04 @ 07:04  VBG O2 sat 99.4 04-04 @ 07:04  VBG lactate 2.5 04-04 @ 07:04        04-03 @ 07:01  -  04-04 @ 07:00  --------------------------------------------------------  IN: 120 mL / OUT: 0 mL / NET: 120 mL          LABS:                        10.5   11.30 )-----------( 97       ( 04 Apr 2025 07:03 )             36.5     04-04    132[L]  |  95[L]  |  52[H]  ----------------------------<  130[H]  4.9   |  21[L]  |  5.83[H]    Ca    8.3[L]      04 Apr 2025 07:03  Phos  5.6     04-03  Mg     2.4     04-03    TPro  7.1  /  Alb  2.9[L]  /  TBili  0.6  /  DBili  x   /  AST  41[H]  /  ALT  5[L]  /  AlkPhos  232[H]  04-04          CAPILLARY BLOOD GLUCOSE      POCT Blood Glucose.: 127 mg/dL (04 Apr 2025 08:31)      Urinalysis Basic - ( 04 Apr 2025 07:03 )    Color: x / Appearance: x / SG: x / pH: x  Gluc: 130 mg/dL / Ketone: x  / Bili: x / Urobili: x   Blood: x / Protein: x / Nitrite: x   Leuk Esterase: x / RBC: x / WBC x   Sq Epi: x / Non Sq Epi: x / Bacteria: x                    CULTURES: (if applicable)     (collected 03-30-25 @ 17:36)  Source: Blood Blood-Peripheral  Preliminary Report (04-03-25 @ 22:01):    No growth at 4 days     (collected 03-26-25 @ 12:31)  Source: Blood Blood-Peripheral  Final Report (03-31-25 @ 18:00):    No growth at 5 days     (collected 03-26-25 @ 12:25)  Source: Blood Blood-Peripheral  Final Report (03-31-25 @ 18:00):    No growth at 5 days        Physical Examination:    General: No acute distress.      HEENT: Pupils equal, reactive to light.  Symmetric.    PULM: Clear to auscultation bilaterally, no significant sputum production    CVS: S1, S2    ABD: Soft, nondistended, nontender, normoactive bowel sounds, no masses    EXT: No edema, nontender    SKIN: Warm and well perfused, no rashes noted.    NEURO: Alert, oriented, interactive, nonfocal    RADIOLOGY REVIEWED  CXR: congestion  L effusion/atelectasis        PULMONARY CONSULT    HPI: 78 y/o M with PMH of CAD s/p CABG, HF (combined HFrEF [EF 25%] and HFpEF [G3DD]) w/ ICD, AFib on Xarelto, severe AS s/p TAVR, COPD on 3-4L home O2, CKD3, HTN, HLD, T2DM, and BPH who presents as a transfer from [facility] for HF evaluation. He presented to [place] on 1/10/25 for worsening of his chronic SOB for 2 weeks, with associated increased LE edema and abdominal distention - admitted for acute on chronic hypoxic respiratory failure, treated with IV diuresis & Bipap. Tx to [facility] for further evaluation by HF team. Prolonged hospital course for CHF exacerbation, DENISHA on CKD now requiring HD, RRT/Code stroke 3/30 for lethargy and facial droop, CT head negative. Pulmonary called to consult for acute on chronic hypoxic/hypercapnic respiratory failure requiring Bipap (current settings 18/7). Hx of COPD on home O2 (baseline 3LNC), EMBER on CPAP qHS. Outpt pulm Dr. Lucien Mack. Pt seen in HD, reports SOB chronic but overall improving since admission. Denies SOB at rest. Denies CP, fever, chills. O2 sats 98% on 3LNC.           PAST MEDICAL & SURGICAL HISTORY:  HTN (hypertension)  COPD (chronic obstructive pulmonary disease)  HLD (hyperlipidemia)  Insulin dependent type 2 diabetes mellitus  Chronic hypoxic respiratory failure, on home oxygen therapy  Stage 4 chronic kidney disease  Chronic combined systolic and diastolic heart failure  Unspecified atrial fibrillation  S/P CABG x 3  S/P hernia repair  S/P implantation of automatic cardioverter/defibrillator (AICD)    No Known Allergies    FAMILY HISTORY:  FH: HTN (hypertension)    Social history: former smoker     Review of Systems:  CONSTITUTIONAL: No fever, chills, or fatigue  EYES: No eye pain, visual disturbances, or discharge  ENMT:  No difficulty hearing, tinnitus, vertigo; No sinus or throat pain  NECK: No pain or stiffness  RESPIRATORY: Per above  CARDIOVASCULAR: No chest pain, palpitations, dizziness, or leg swelling  GASTROINTESTINAL: No abdominal or epigastric pain. No nausea, vomiting, or hematemesis; No diarrhea or constipation. No melena or hematochezia.  GENITOURINARY: No dysuria, frequency, hematuria, or incontinence  NEUROLOGICAL: No headaches, memory loss, loss of strength, numbness, or tremors  SKIN: No itching, burning, rashes, or lesions   MUSCULOSKELETAL: No joint pain or swelling; No muscle, back, or extremity pain  PSYCHIATRIC: No depression, anxiety, mood swings, or difficulty sleeping      Medications:  MEDICATIONS  (STANDING):  allopurinol 100 milliGRAM(s) Oral daily  apixaban 2.5 milliGRAM(s) Oral two times a day  aspirin enteric coated 81 milliGRAM(s) Oral daily  atorvastatin 20 milliGRAM(s) Oral at bedtime  buMETAnide Injectable 2 milliGRAM(s) IV Push every 12 hours  calcium acetate 667 milliGRAM(s) Oral three times a day with meals  chlorhexidine 2% Cloths 1 Application(s) Topical daily  chlorhexidine 4% Liquid 1 Application(s) Topical <User Schedule>  dextrose 5%. 1000 milliLiter(s) (100 mL/Hr) IV Continuous <Continuous>  dextrose 5%. 1000 milliLiter(s) (50 mL/Hr) IV Continuous <Continuous>  dextrose 50% Injectable 25 Gram(s) IV Push once  dextrose 50% Injectable 12.5 Gram(s) IV Push once  dextrose 50% Injectable 25 Gram(s) IV Push once  epoetin lashawn (EPOGEN) Injectable 18821 Unit(s) IV Push <User Schedule>  fluticasone propionate/ salmeterol 250-50 MICROgram(s) Diskus 1 Dose(s) Inhalation two times a day  folic acid 1 milliGRAM(s) Oral daily  glucagon  Injectable 1 milliGRAM(s) IntraMuscular once  insulin glargine Injectable (LANTUS) 18 Unit(s) SubCutaneous at bedtime  insulin lispro (ADMELOG) corrective regimen sliding scale   SubCutaneous three times a day before meals  insulin lispro Injectable (ADMELOG) 3 Unit(s) SubCutaneous three times a day before meals  metoprolol succinate ER 25 milliGRAM(s) Oral daily  pantoprazole    Tablet 40 milliGRAM(s) Oral every 12 hours  polyethylene glycol 3350 17 Gram(s) Oral two times a day  psyllium Powder 1 Packet(s) Oral daily  sodium chloride 0.65% Nasal 1 Spray(s) Both Nostrils two times a day  tamsulosin 0.4 milliGRAM(s) Oral at bedtime  tiotropium 2.5 MICROgram(s) Inhaler 2 Puff(s) Inhalation daily    MEDICATIONS  (PRN):  acetaminophen     Tablet .. 650 milliGRAM(s) Oral every 6 hours PRN Temp greater or equal to 38C (100.4F), Mild Pain (1 - 3)  albuterol/ipratropium for Nebulization 3 milliLiter(s) Nebulizer every 6 hours PRN Shortness of Breath and/or Wheezing  bisacodyl 5 milliGRAM(s) Oral every 12 hours PRN Constipation  dextrose Oral Gel 15 Gram(s) Oral once PRN Blood Glucose LESS THAN 70 milliGRAM(s)/deciliter  dextrose Oral Gel 15 Gram(s) Oral once PRN Blood Glucose LESS THAN 70 milliGRAM(s)/deciliter  melatonin 3 milliGRAM(s) Oral at bedtime PRN Insomnia  ondansetron Injectable 4 milliGRAM(s) IV Push once PRN Nausea and/or Vomiting  ondansetron Injectable 4 milliGRAM(s) IV Push every 6 hours PRN Nausea and/or Vomiting  sodium chloride 0.9% lock flush 10 milliLiter(s) IV Push every 1 hour PRN Pre/post blood products, medications, blood draw, and to maintain line patency    Vital Signs Last 24 Hrs  T(C): 36.8 (04 Apr 2025 09:03), Max: 37.1 (03 Apr 2025 20:35)  T(F): 98.2 (04 Apr 2025 09:03), Max: 98.7 (03 Apr 2025 20:35)  HR: 68 (04 Apr 2025 09:03) (68 - 75)  BP: 114/61 (04 Apr 2025 09:03) (91/62 - 114/61)  BP(mean): 77 (03 Apr 2025 11:12) (77 - 77)  RR: 18 (04 Apr 2025 09:03) (17 - 18)  SpO2: 98% (04 Apr 2025 09:03) (94% - 100%)    Parameters below as of 04 Apr 2025 09:03  Patient On (Oxygen Delivery Method): nasal cannula  O2 Flow (L/min): 3      ABG - ( 03 Apr 2025 17:00 )  pH, Arterial: 7.30  pH, Blood: x     /  pCO2: 64    /  pO2: 75    / HCO3: 32    / Base Excess: 3.8   /  SaO2: 97.9              VBG pH 7.36 04-04 @ 07:04  VBG pCO2 48 04-04 @ 07:04  VBG O2 sat 99.4 04-04 @ 07:04  VBG lactate 2.5 04-04 @ 07:04        04-03 @ 07:01  -  04-04 @ 07:00  --------------------------------------------------------  IN: 120 mL / OUT: 0 mL / NET: 120 mL          LABS:                        10.5   11.30 )-----------( 97       ( 04 Apr 2025 07:03 )             36.5     04-04    132[L]  |  95[L]  |  52[H]  ----------------------------<  130[H]  4.9   |  21[L]  |  5.83[H]    Ca    8.3[L]      04 Apr 2025 07:03  Phos  5.6     04-03  Mg     2.4     04-03    TPro  7.1  /  Alb  2.9[L]  /  TBili  0.6  /  DBili  x   /  AST  41[H]  /  ALT  5[L]  /  AlkPhos  232[H]  04-04          CAPILLARY BLOOD GLUCOSE      POCT Blood Glucose.: 127 mg/dL (04 Apr 2025 08:31)      Urinalysis Basic - ( 04 Apr 2025 07:03 )    Color: x / Appearance: x / SG: x / pH: x  Gluc: 130 mg/dL / Ketone: x  / Bili: x / Urobili: x   Blood: x / Protein: x / Nitrite: x   Leuk Esterase: x / RBC: x / WBC x   Sq Epi: x / Non Sq Epi: x / Bacteria: x                    CULTURES: (if applicable)     (collected 03-30-25 @ 17:36)  Source: Blood Blood-Peripheral  Preliminary Report (04-03-25 @ 22:01):    No growth at 4 days     (collected 03-26-25 @ 12:31)  Source: Blood Blood-Peripheral  Final Report (03-31-25 @ 18:00):    No growth at 5 days     (collected 03-26-25 @ 12:25)  Source: Blood Blood-Peripheral  Final Report (03-31-25 @ 18:00):    No growth at 5 days        Physical Examination:    General: No acute distress.      HEENT: Pupils equal, reactive to light.  Symmetric.    PULM: Decreased BS at bases     CVS: S1, S2    ABD: Soft, nondistended, nontender, normoactive bowel sounds, no masses    EXT: +LE edema     SKIN: Warm and well perfused, no rashes noted.    NEURO: Alert, oriented, interactive, nonfocal    RADIOLOGY REVIEWED  CXR: congestion  L effusion/atelectasis     TRANSTHORACIC ECHOCARDIOGRAM REPORT  ________________________________________________________________________________                                      _______       Pt. Name:       JO VARELA Study Date:    2/7/2025  MRN:            [id number]    YOB: 1945  Accession #:    [id number]     Age:           79 years  Account#:       [id number]  Gender:        M  Heart Rate:     63 bpm        Height:        71.00 in (180.34 cm)  Rhythm:         sinus rhythm  Weight:        242.50 lb (110.00 kg)  Blood Pressure: 106/67 mmHg   BSA/BMI:       2.29 m² / 33.82 kg/m²  ________________________________________________________________________________________  Referring Physician:    [id number] Aliyah Correa  Interpreting Physician: Jo Lin MD  Primary Sonographer:    Micah Pedraza [facility]  Fellow (Performing):    Ana M Noyola MD    CPT:                ECHO TTE WITH CON COMP W DOPP - .m;DEFINITY ECHO                      CONTRAST PER ML - .m;DEFINITY ECHO CONTRAST PER ML                      WASTED - .m  Indication(s):      Dyspnea, unspecified - R06.00  Procedure:          Transthoracic echocardiogram with 2-D, M-mode and complete                      spectral and color flow Doppler.  Ordering Location:  [facility]  Admission Status:   Inpatient  Contrast Injection: Verbal consent was obtained for injection of Ultrasonic                      Enhancing Agent following a discussion of risks and                      benefits.                      Endocardial visualization enhanced with 2 ml of Definity                      Ultrasound enhancing agent (Lot#:[id number] Exp.Date:2025-AUG                      Discarded Dose:8ml).  UEA Reaction:       Patient had no adverse reaction after injection of                      Ultrasound Enhancing Agent.  Study Information:  Image quality for this study is technically difficult.    _______________________________________________________________________________________     CONCLUSIONS:      1. Definity ultrasound enhancing agent was given for enhanced left ventricular opacification and improved delineation of the left ventricular endocardial borders.   2. Left ventricular wall thickness is normal. Left ventricular systolic function is severely decreased with an ejection fraction visually estimated at 30 %. Global left ventricular hypokinesis.   3. Multiple segmental abnormalities exist. See findings.   4. At least "moderate" mitral regurgitation.   5. 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 Trace intravalvular regurgitation.   6. Severely enlarged right ventricular cavity size and reduced right ventricular systolic function.   7. Probably severe tricuspid regurgitation.   8. Device lead is visualized in the right heart.    ________________________________________________________________________________________  FINDINGS:     Left Ventricle:  After obtaining consent, Definity ultrasound enhancing agent was given for enhanced left ventricular opacification and improved delineation of the left ventricular endocardial borders. Left ventricular wall thickness is normal. Left ventricular systolic function is severely decreased with an ejection fraction visually estimated at 30%. There is global left ventricular hypokinesis. There is no evidence of a left ventricular thrombus.  LV Wall Scoring: The entire apex is akinetic. The basal and mid anterior wall,  basal and mid anterolateral wall, basal and mid anterior septum, basal and mid  inferior septum, basal and mid inferior wall, and basal and mid inferolateral  wall are hypokinetic.          Right Ventricle:  The right ventricular cavity is severely enlarged in size and right ventricular systolic function is reduced. Tricuspid annular plane systolic excursion (TAPSE) is 1.0 cm (normal >=1.7 cm). Tricuspid annular tissue Doppler S' is 4.2 cm/s (normal >10 cm/s). A device lead is visualized in the right heart.     Left Atrium:  The left atrium is moderately dilated with an indexed volume of 43.69 ml/m².     Right Atrium:  The right atrium is severely dilated with an indexed volume of 48.50 ml/m².     Interatrial Septum:  The interatrial septum appears intact.     Aortic Valve:  A a transcatheter deployed (TAVR) is present in the aortic position. The prosthetic valve has normal function. The peak transaortic velocity is 2.00 m/s, peak transaortic gradient is 16.0 mmHg and mean transaortic gradient is 9.0 mmHg with an LVOT/aortic valve VTI ratio of 0.41. The effective orifice area is estimated at 1.99 cm² by the continuity equation. There is trace intravalvular regurgitation.     Mitral Valve:  There is at least "moderate" mitral regurgitation.     Tricuspid Valve:  There is probably severe tricuspid regurgitation. Estimated pulmonary artery systolic pressure is 52 mmHg, consistent with mild to moderate pulmonary hypertension.     Pulmonic Valve:  There is mild pulmonic regurgitation.     Aorta:  The aortic root appears normal in size.     Pericardium:  No pericardial effusion seen.     Systemic Veins:  The inferior vena cava is dilated measuring 2.35 cm in diameter, (dilated >2.1cm) with abnormal inspiratory collapse (abnormal <50%) consistent with elevated right atrial pressure (~15, range 10-20mmHg).  ____________________________________________________________________  QUANTITATIVE DATA:  Left Ventricle Measurements: (Indexed to BSA)     IVSd (2D):   1.1 cm  LVPWd (2D):  1.1 cm  LVIDd (2D):  5.3 cm  LVIDs (2D):  4.5 cm  LV Mass:     228 g  99.5 g/m²  Visualized LV EF%: 30%     MV E Vmax:    1.05 m/s  e' lateral:   4.73 cm/s  e' medial:    4.16 cm/s  E/e' lateral: 22.20  E/e' medial:  25.24  E/e' Average: 23.62    Aorta Measurements: (Normal range) (Indexed to BSA)     Ao Root d     3.60 cm (3.1 - 3.7 cm) 1.57 cm/m²  Ao Asc d, 2D: 3.60  Ao Asc prox:  3.60 cm                1.57 cm/m²  Ao Arch:      2.8 cm            Left Atrium Measurements: (Indexed to BSA)  LA Diam 2D:        5.70 cm  LA Vol s, MOD A4C: 85.80 ml.  LA Vol s, MOD A2C: 116.00 ml.  LA Vol s, MOD BP:  100.00 ml  43.69 ml/m²       Right Ventricle Measurements: Right Atrial Measurements:     TAPSE:           1.0 cm       RA Vol:            111.00 ml  RV Base (RVID1): 5.6 cm       RA Vol s, MOD A4C  111.0 ml  RV Mid (RVID2):  4.7 cm       RA Vol Index:      48.50 ml/m²                                RA Area s, MOD A4C 29.2 cm²       LVOT / RVOT/ Qp/Qs Data: (Indexed to BSA)  LVOT Diameter,s: 2.50 cm  LVOT Area:       4.91 cm²  LVOT Vmax:       0.92 m/s  LVOT Vmn:        0.622 m/s  LVOT VTI:        16.00 cm  LVOT peak grad:  3 mmHg  LVOT mean grad:  1.5 mmHg  LVOT SV:         78.5 ml   34.31 ml/m²    Aortic Valve Measurements:  AV Vmax:                2.0 m/s  AV Peak Gradient:       16.0 mmHg  AV Mean Gradient:       9.0 mmHg  AV VTI:                 39.4 cm  AV VTI Ratio:           0.41  AoV EOA, Contin:        1.99 cm²  AoV EOA, Contin i:      0.87 cm²/m²  AoV Dimensionless Index 0.41    Mitral Valve Measurements:     MV E Vmax: 1.0 m/s       Tricuspid Valve Measurements:     TV S'             4.2 cm/s  TR Vmax:          3.0 m/s  TR Peak Gradient: 36.7 mmHg  RA Pressure:      15 mmHg  PASP:             52 mmHg

## 2025-04-05 LAB
ALBUMIN SERPL ELPH-MCNC: 2.9 G/DL — LOW (ref 3.3–5)
ALP SERPL-CCNC: 227 U/L — HIGH (ref 40–120)
ALT FLD-CCNC: <5 U/L — LOW (ref 10–45)
ANION GAP SERPL CALC-SCNC: 12 MMOL/L — SIGNIFICANT CHANGE UP (ref 5–17)
AST SERPL-CCNC: 32 U/L — SIGNIFICANT CHANGE UP (ref 10–40)
BASE EXCESS BLDA CALC-SCNC: 3.6 MMOL/L — HIGH (ref -2–3)
BILIRUB SERPL-MCNC: 0.6 MG/DL — SIGNIFICANT CHANGE UP (ref 0.2–1.2)
BUN SERPL-MCNC: 38 MG/DL — HIGH (ref 7–23)
CALCIUM SERPL-MCNC: 8.3 MG/DL — LOW (ref 8.4–10.5)
CHLORIDE SERPL-SCNC: 97 MMOL/L — SIGNIFICANT CHANGE UP (ref 96–108)
CO2 BLDA-SCNC: 32 MMOL/L — HIGH (ref 19–24)
CO2 SERPL-SCNC: 27 MMOL/L — SIGNIFICANT CHANGE UP (ref 22–31)
CREAT SERPL-MCNC: 4.77 MG/DL — HIGH (ref 0.5–1.3)
EGFR: 12 ML/MIN/1.73M2 — LOW
EGFR: 12 ML/MIN/1.73M2 — LOW
GAS PNL BLDA: SIGNIFICANT CHANGE UP
GLUCOSE BLDC GLUCOMTR-MCNC: 144 MG/DL — HIGH (ref 70–99)
GLUCOSE BLDC GLUCOMTR-MCNC: 154 MG/DL — HIGH (ref 70–99)
GLUCOSE BLDC GLUCOMTR-MCNC: 166 MG/DL — HIGH (ref 70–99)
GLUCOSE BLDC GLUCOMTR-MCNC: 191 MG/DL — HIGH (ref 70–99)
GLUCOSE SERPL-MCNC: 143 MG/DL — HIGH (ref 70–99)
HCO3 BLDA-SCNC: 30 MMOL/L — HIGH (ref 21–28)
HCT VFR BLD CALC: 34.3 % — LOW (ref 39–50)
HGB BLD-MCNC: 10 G/DL — LOW (ref 13–17)
HOROWITZ INDEX BLDA+IHG-RTO: 32 — SIGNIFICANT CHANGE UP
MCHC RBC-ENTMCNC: 26 PG — LOW (ref 27–34)
MCHC RBC-ENTMCNC: 29.2 G/DL — LOW (ref 32–36)
MCV RBC AUTO: 89.3 FL — SIGNIFICANT CHANGE UP (ref 80–100)
NRBC BLD AUTO-RTO: 0 /100 WBCS — SIGNIFICANT CHANGE UP (ref 0–0)
PCO2 BLDA: 52 MMHG — HIGH (ref 35–48)
PH BLDA: 7.37 — SIGNIFICANT CHANGE UP (ref 7.35–7.45)
PLATELET # BLD AUTO: 117 K/UL — LOW (ref 150–400)
PO2 BLDA: 187 MMHG — HIGH (ref 83–108)
POTASSIUM SERPL-MCNC: 4 MMOL/L — SIGNIFICANT CHANGE UP (ref 3.5–5.3)
POTASSIUM SERPL-SCNC: 4 MMOL/L — SIGNIFICANT CHANGE UP (ref 3.5–5.3)
PROT SERPL-MCNC: 6.6 G/DL — SIGNIFICANT CHANGE UP (ref 6–8.3)
RBC # BLD: 3.84 M/UL — LOW (ref 4.2–5.8)
RBC # FLD: 18.8 % — HIGH (ref 10.3–14.5)
SAO2 % BLDA: SIGNIFICANT CHANGE UP % (ref 94–98)
SODIUM SERPL-SCNC: 136 MMOL/L — SIGNIFICANT CHANGE UP (ref 135–145)
TROPONIN T, HIGH SENSITIVITY RESULT: 202 NG/L — HIGH (ref 0–51)
WBC # BLD: 10.71 K/UL — HIGH (ref 3.8–10.5)
WBC # FLD AUTO: 10.71 K/UL — HIGH (ref 3.8–10.5)

## 2025-04-05 PROCEDURE — 99232 SBSQ HOSP IP/OBS MODERATE 35: CPT

## 2025-04-05 RX ORDER — IPRATROPIUM BROMIDE AND ALBUTEROL SULFATE .5; 2.5 MG/3ML; MG/3ML
3 SOLUTION RESPIRATORY (INHALATION) EVERY 6 HOURS
Refills: 0 | Status: DISCONTINUED | OUTPATIENT
Start: 2025-04-05 | End: 2025-04-14

## 2025-04-05 RX ADMIN — Medication 1 DOSE(S): at 06:20

## 2025-04-05 RX ADMIN — Medication 667 MILLIGRAM(S): at 09:15

## 2025-04-05 RX ADMIN — Medication 40 MILLIGRAM(S): at 18:38

## 2025-04-05 RX ADMIN — POLYETHYLENE GLYCOL 3350 17 GRAM(S): 17 POWDER, FOR SOLUTION ORAL at 06:21

## 2025-04-05 RX ADMIN — TAMSULOSIN HYDROCHLORIDE 0.4 MILLIGRAM(S): 0.4 CAPSULE ORAL at 21:48

## 2025-04-05 RX ADMIN — Medication 1 APPLICATION(S): at 12:00

## 2025-04-05 RX ADMIN — Medication 1 SPRAY(S): at 18:09

## 2025-04-05 RX ADMIN — IPRATROPIUM BROMIDE AND ALBUTEROL SULFATE 3 MILLILITER(S): .5; 2.5 SOLUTION RESPIRATORY (INHALATION) at 23:18

## 2025-04-05 RX ADMIN — Medication 81 MILLIGRAM(S): at 13:12

## 2025-04-05 RX ADMIN — INSULIN LISPRO 2: 100 INJECTION, SOLUTION INTRAVENOUS; SUBCUTANEOUS at 18:13

## 2025-04-05 RX ADMIN — Medication 100 MILLIGRAM(S): at 13:10

## 2025-04-05 RX ADMIN — ATORVASTATIN CALCIUM 20 MILLIGRAM(S): 80 TABLET, FILM COATED ORAL at 21:48

## 2025-04-05 RX ADMIN — Medication 1 APPLICATION(S): at 06:22

## 2025-04-05 RX ADMIN — Medication 667 MILLIGRAM(S): at 18:07

## 2025-04-05 RX ADMIN — INSULIN LISPRO 3 UNIT(S): 100 INJECTION, SOLUTION INTRAVENOUS; SUBCUTANEOUS at 12:49

## 2025-04-05 RX ADMIN — Medication 1 SPRAY(S): at 06:22

## 2025-04-05 RX ADMIN — FOLIC ACID 1 MILLIGRAM(S): 1 TABLET ORAL at 13:11

## 2025-04-05 RX ADMIN — METOPROLOL SUCCINATE 25 MILLIGRAM(S): 50 TABLET, EXTENDED RELEASE ORAL at 06:21

## 2025-04-05 RX ADMIN — INSULIN GLARGINE-YFGN 18 UNIT(S): 100 INJECTION, SOLUTION SUBCUTANEOUS at 21:48

## 2025-04-05 RX ADMIN — Medication 667 MILLIGRAM(S): at 13:13

## 2025-04-05 RX ADMIN — INSULIN LISPRO 3 UNIT(S): 100 INJECTION, SOLUTION INTRAVENOUS; SUBCUTANEOUS at 09:16

## 2025-04-05 RX ADMIN — TIOTROPIUM BROMIDE INHALATION SPRAY 2 PUFF(S): 3.12 SPRAY, METERED RESPIRATORY (INHALATION) at 18:07

## 2025-04-05 RX ADMIN — Medication 3 MILLIGRAM(S): at 22:00

## 2025-04-05 RX ADMIN — INSULIN LISPRO 3 UNIT(S): 100 INJECTION, SOLUTION INTRAVENOUS; SUBCUTANEOUS at 18:15

## 2025-04-05 RX ADMIN — IPRATROPIUM BROMIDE AND ALBUTEROL SULFATE 3 MILLILITER(S): .5; 2.5 SOLUTION RESPIRATORY (INHALATION) at 18:16

## 2025-04-05 RX ADMIN — BUMETANIDE 2 MILLIGRAM(S): 1 TABLET ORAL at 06:20

## 2025-04-05 RX ADMIN — INSULIN LISPRO 2: 100 INJECTION, SOLUTION INTRAVENOUS; SUBCUTANEOUS at 13:09

## 2025-04-05 RX ADMIN — APIXABAN 2.5 MILLIGRAM(S): 2.5 TABLET, FILM COATED ORAL at 18:13

## 2025-04-05 RX ADMIN — IPRATROPIUM BROMIDE AND ALBUTEROL SULFATE 3 MILLILITER(S): .5; 2.5 SOLUTION RESPIRATORY (INHALATION) at 13:12

## 2025-04-05 RX ADMIN — Medication 1 DOSE(S): at 18:07

## 2025-04-05 RX ADMIN — Medication 40 MILLIGRAM(S): at 06:21

## 2025-04-05 RX ADMIN — BUMETANIDE 2 MILLIGRAM(S): 1 TABLET ORAL at 18:13

## 2025-04-05 RX ADMIN — POLYETHYLENE GLYCOL 3350 17 GRAM(S): 17 POWDER, FOR SOLUTION ORAL at 18:10

## 2025-04-05 RX ADMIN — APIXABAN 2.5 MILLIGRAM(S): 2.5 TABLET, FILM COATED ORAL at 06:21

## 2025-04-05 NOTE — PROGRESS NOTE ADULT - ASSESSMENT
80 y/o male w/ PMHx CAD s/p CABG, HF (combined HFrEF [EF 25%] and HFpEF [G3DD]) w/ ICD, AFib on Xarelto, severe AS s/p TAVR, COPD on 3-4L home O2, CKD4, HTN, HLD, T2DM, and BPH who presents as a transfer from [facility] for HF evaluation. Ongoing CHF exacerbation now off bumex gtt, new DENISHA on CKD likely 2/2 ongoing cardiorenal and possible component of abdominal compartment syndrome given tremendous ascites, now s/p tunnelled HD catheter.  RRT/Code Stroke on 3/30/25 for lethargy and facial droop, CT negative, likely in the setting of hypercarbia, placed on bipap with improvement.

## 2025-04-05 NOTE — PROVIDER CONTACT NOTE (CRITICAL VALUE NOTIFICATION) - PERSON GIVING RESULT:
lele Pride
Lab: ayesha caal
Phillip Adair
Brando Stokes
adore SCOTT
Brando Stokes
Mika Hickey/ Lab
Laboratory, Dagoberto Allred

## 2025-04-05 NOTE — PROGRESS NOTE ADULT - PROBLEM SELECTOR PLAN 3
- EF 30% with severe tricuspid regurg and PASP 55mmHG on 2/7/24. Dry wt is 243 lbs.   - c/w bumex 2mg IV bid   - GDMT:  Metoprolol succinate 50mg day. Holding entresto and aldactone for DENISHA. Not on SGLT2.  - Hydralazine/Nitrate: hydral 50mg TID. Isordil 20mg TID   - patient declined RHC and cardiomems  - per GOC with wife; pt dnr/dni but they wish to keep ICD active  - remove ro - EF 30% with severe tricuspid regurg and PASP 55mmHG on 2/7/24. Dry wt is 243 lbs.   - c/w bumex 2mg IV bid   - GDMT:  Metoprolol succinate 50mg day. Holding entresto and aldactone for DENISHA. Not on SGLT2.  - Hydralazine/Nitrate: hydral 50mg TID. Isordil 20mg TID   - patient declined RHC and cardiomems  - per GOC with wife; pt dnr/dni but they wish to keep ICD active

## 2025-04-05 NOTE — PROGRESS NOTE ADULT - SUBJECTIVE AND OBJECTIVE BOX
[facility] Division of Hospital Medicine  Aashish Moya MD  Available via MS Teams    SUBJECTIVE / OVERNIGHT EVENTS:  Overnight, patient reported chest pain, did not appear to be cardiac in nature, got Maalox and it improved.    MEDICATIONS  (STANDING):  allopurinol 100 milliGRAM(s) Oral daily  apixaban 2.5 milliGRAM(s) Oral two times a day  aspirin enteric coated 81 milliGRAM(s) Oral daily  atorvastatin 20 milliGRAM(s) Oral at bedtime  buMETAnide Injectable 2 milliGRAM(s) IV Push every 12 hours  calcium acetate 667 milliGRAM(s) Oral three times a day with meals  chlorhexidine 2% Cloths 1 Application(s) Topical daily  chlorhexidine 4% Liquid 1 Application(s) Topical <User Schedule>  dextrose 5%. 1000 milliLiter(s) (100 mL/Hr) IV Continuous <Continuous>  dextrose 5%. 1000 milliLiter(s) (50 mL/Hr) IV Continuous <Continuous>  dextrose 50% Injectable 25 Gram(s) IV Push once  dextrose 50% Injectable 12.5 Gram(s) IV Push once  dextrose 50% Injectable 25 Gram(s) IV Push once  epoetin lashawn (EPOGEN) Injectable 01178 Unit(s) IV Push <User Schedule>  fluticasone propionate/ salmeterol 250-50 MICROgram(s) Diskus 1 Dose(s) Inhalation two times a day  folic acid 1 milliGRAM(s) Oral daily  glucagon  Injectable 1 milliGRAM(s) IntraMuscular once  insulin glargine Injectable (LANTUS) 18 Unit(s) SubCutaneous at bedtime  insulin lispro (ADMELOG) corrective regimen sliding scale   SubCutaneous three times a day before meals  insulin lispro Injectable (ADMELOG) 3 Unit(s) SubCutaneous three times a day before meals  metoprolol succinate ER 25 milliGRAM(s) Oral daily  pantoprazole    Tablet 40 milliGRAM(s) Oral every 12 hours  polyethylene glycol 3350 17 Gram(s) Oral two times a day  psyllium Powder 1 Packet(s) Oral daily  sodium chloride 0.65% Nasal 1 Spray(s) Both Nostrils two times a day  tamsulosin 0.4 milliGRAM(s) Oral at bedtime  tiotropium 2.5 MICROgram(s) Inhaler 2 Puff(s) Inhalation daily    MEDICATIONS  (PRN):  acetaminophen     Tablet .. 650 milliGRAM(s) Oral every 6 hours PRN Temp greater or equal to 38C (100.4F), Mild Pain (1 - 3)  albuterol/ipratropium for Nebulization 3 milliLiter(s) Nebulizer every 6 hours PRN Shortness of Breath and/or Wheezing  bisacodyl 5 milliGRAM(s) Oral every 12 hours PRN Constipation  dextrose Oral Gel 15 Gram(s) Oral once PRN Blood Glucose LESS THAN 70 milliGRAM(s)/deciliter  dextrose Oral Gel 15 Gram(s) Oral once PRN Blood Glucose LESS THAN 70 milliGRAM(s)/deciliter  melatonin 3 milliGRAM(s) Oral at bedtime PRN Insomnia  ondansetron Injectable 4 milliGRAM(s) IV Push once PRN Nausea and/or Vomiting  ondansetron Injectable 4 milliGRAM(s) IV Push every 6 hours PRN Nausea and/or Vomiting  sodium chloride 0.9% lock flush 10 milliLiter(s) IV Push every 1 hour PRN Pre/post blood products, medications, blood draw, and to maintain line patency      I&O's Summary    04 Apr 2025 07:01  -  05 Apr 2025 07:00  --------------------------------------------------------  IN: 0 mL / OUT: 2000 mL / NET: -2000 mL        PHYSICAL EXAM:  Vital Signs Last 24 Hrs  T(C): 36.8 (05 Apr 2025 04:39), Max: 36.8 (04 Apr 2025 20:38)  T(F): 98.2 (05 Apr 2025 04:39), Max: 98.2 (04 Apr 2025 20:38)  HR: 78 (05 Apr 2025 08:01) (69 - 78)  BP: 124/78 (05 Apr 2025 04:39) (103/52 - 124/78)  BP(mean): --  RR: 18 (05 Apr 2025 04:39) (18 - 18)  SpO2: 98% (05 Apr 2025 08:01) (93% - 99%)    Parameters below as of 05 Apr 2025 04:39  Patient On (Oxygen Delivery Method): nasal cannula      GENERAL: NAD, obese   CHEST/LUNG: Clear to auscultation bilaterally; No wheeze  HEART: Regular rate and rhythm; S1S2  ABDOMEN: Soft, Nontender, distended; Bowel sounds present  EXTREMITIES:  anasarca       LABS:                        10.0   10.71 )-----------( 117      ( 05 Apr 2025 04:59 )             34.3     04-05    136  |  97  |  38[H]  ----------------------------<  143[H]  4.0   |  27  |  4.77[H]    Ca    8.3[L]      05 Apr 2025 04:59    TPro  6.6  /  Alb  2.9[L]  /  TBili  0.6  /  DBili  x   /  AST  32  /  ALT  <5[L]  /  AlkPhos  227[H]  04-05          Urinalysis Basic - ( 05 Apr 2025 04:59 )    Color: x / Appearance: x / SG: x / pH: x  Gluc: 143 mg/dL / Ketone: x  / Bili: x / Urobili: x   Blood: x / Protein: x / Nitrite: x   Leuk Esterase: x / RBC: x / WBC x   Sq Epi: x / Non Sq Epi: x / Bacteria: x        COVID-19 PCR: Mary (25 Mar 2025 14:00)  SARS-CoV-2: Chadwickterukhsana (10 Tay 2025 19:29)      RADIOLOGY & ADDITIONAL TESTS: Reviewed   [facility] Division of Hospital Medicine  Aashish Moya MD  Available via MS Teams    SUBJECTIVE / OVERNIGHT EVENTS:  Overnight, patient reported chest pain, did not appear to be cardiac in nature, got Maalox and it improved. Denies CP or new complaints this AM.    MEDICATIONS  (STANDING):  allopurinol 100 milliGRAM(s) Oral daily  apixaban 2.5 milliGRAM(s) Oral two times a day  aspirin enteric coated 81 milliGRAM(s) Oral daily  atorvastatin 20 milliGRAM(s) Oral at bedtime  buMETAnide Injectable 2 milliGRAM(s) IV Push every 12 hours  calcium acetate 667 milliGRAM(s) Oral three times a day with meals  chlorhexidine 2% Cloths 1 Application(s) Topical daily  chlorhexidine 4% Liquid 1 Application(s) Topical <User Schedule>  dextrose 5%. 1000 milliLiter(s) (100 mL/Hr) IV Continuous <Continuous>  dextrose 5%. 1000 milliLiter(s) (50 mL/Hr) IV Continuous <Continuous>  dextrose 50% Injectable 25 Gram(s) IV Push once  dextrose 50% Injectable 12.5 Gram(s) IV Push once  dextrose 50% Injectable 25 Gram(s) IV Push once  epoetin lashawn (EPOGEN) Injectable 38673 Unit(s) IV Push <User Schedule>  fluticasone propionate/ salmeterol 250-50 MICROgram(s) Diskus 1 Dose(s) Inhalation two times a day  folic acid 1 milliGRAM(s) Oral daily  glucagon  Injectable 1 milliGRAM(s) IntraMuscular once  insulin glargine Injectable (LANTUS) 18 Unit(s) SubCutaneous at bedtime  insulin lispro (ADMELOG) corrective regimen sliding scale   SubCutaneous three times a day before meals  insulin lispro Injectable (ADMELOG) 3 Unit(s) SubCutaneous three times a day before meals  metoprolol succinate ER 25 milliGRAM(s) Oral daily  pantoprazole    Tablet 40 milliGRAM(s) Oral every 12 hours  polyethylene glycol 3350 17 Gram(s) Oral two times a day  psyllium Powder 1 Packet(s) Oral daily  sodium chloride 0.65% Nasal 1 Spray(s) Both Nostrils two times a day  tamsulosin 0.4 milliGRAM(s) Oral at bedtime  tiotropium 2.5 MICROgram(s) Inhaler 2 Puff(s) Inhalation daily    MEDICATIONS  (PRN):  acetaminophen     Tablet .. 650 milliGRAM(s) Oral every 6 hours PRN Temp greater or equal to 38C (100.4F), Mild Pain (1 - 3)  albuterol/ipratropium for Nebulization 3 milliLiter(s) Nebulizer every 6 hours PRN Shortness of Breath and/or Wheezing  bisacodyl 5 milliGRAM(s) Oral every 12 hours PRN Constipation  dextrose Oral Gel 15 Gram(s) Oral once PRN Blood Glucose LESS THAN 70 milliGRAM(s)/deciliter  dextrose Oral Gel 15 Gram(s) Oral once PRN Blood Glucose LESS THAN 70 milliGRAM(s)/deciliter  melatonin 3 milliGRAM(s) Oral at bedtime PRN Insomnia  ondansetron Injectable 4 milliGRAM(s) IV Push once PRN Nausea and/or Vomiting  ondansetron Injectable 4 milliGRAM(s) IV Push every 6 hours PRN Nausea and/or Vomiting  sodium chloride 0.9% lock flush 10 milliLiter(s) IV Push every 1 hour PRN Pre/post blood products, medications, blood draw, and to maintain line patency      I&O's Summary    04 Apr 2025 07:01  -  05 Apr 2025 07:00  --------------------------------------------------------  IN: 0 mL / OUT: 2000 mL / NET: -2000 mL        PHYSICAL EXAM:  Vital Signs Last 24 Hrs  T(C): 36.8 (05 Apr 2025 04:39), Max: 36.8 (04 Apr 2025 20:38)  T(F): 98.2 (05 Apr 2025 04:39), Max: 98.2 (04 Apr 2025 20:38)  HR: 78 (05 Apr 2025 08:01) (69 - 78)  BP: 124/78 (05 Apr 2025 04:39) (103/52 - 124/78)  BP(mean): --  RR: 18 (05 Apr 2025 04:39) (18 - 18)  SpO2: 98% (05 Apr 2025 08:01) (93% - 99%)    Parameters below as of 05 Apr 2025 04:39  Patient On (Oxygen Delivery Method): nasal cannula      GENERAL: NAD, obese   CHEST/LUNG: Clear to auscultation bilaterally; No wheeze  HEART: Regular rate and rhythm; S1S2  ABDOMEN: Soft, Nontender, distended; Bowel sounds present  EXTREMITIES:  anasarca       LABS:                        10.0   10.71 )-----------( 117      ( 05 Apr 2025 04:59 )             34.3     04-05    136  |  97  |  38[H]  ----------------------------<  143[H]  4.0   |  27  |  4.77[H]    Ca    8.3[L]      05 Apr 2025 04:59    TPro  6.6  /  Alb  2.9[L]  /  TBili  0.6  /  DBili  x   /  AST  32  /  ALT  <5[L]  /  AlkPhos  227[H]  04-05          Urinalysis Basic - ( 05 Apr 2025 04:59 )    Color: x / Appearance: x / SG: x / pH: x  Gluc: 143 mg/dL / Ketone: x  / Bili: x / Urobili: x   Blood: x / Protein: x / Nitrite: x   Leuk Esterase: x / RBC: x / WBC x   Sq Epi: x / Non Sq Epi: x / Bacteria: x        COVID-19 PCR: NotDetec (25 Mar 2025 14:00)  SARS-CoV-2: NotDetec (10 Tay 2025 19:29)      RADIOLOGY & ADDITIONAL TESTS: Reviewed

## 2025-04-05 NOTE — PROGRESS NOTE ADULT - NS ATTEND OPT1A GEN_ALL_CORE
Medical decision making
History/Exam/Medical decision making
Medical decision making

## 2025-04-05 NOTE — PROGRESS NOTE ADULT - ASSESSMENT
80 y/o M with PMH of CAD s/p CABG, HF (combined HFrEF [EF 25%] and HFpEF [G3DD]) w/ ICD, AFib on Xarelto, severe AS s/p TAVR, COPD on 3-4L home O2, CKD3, HTN, HLD, T2DM, and BPH who presents as a transfer from [facility] for HF evaluation. He presented to [place] on 1/10/25 for worsening of his chronic SOB for 2 weeks, with associated increased LE edema and abdominal distention - admitted for acute on chronic hypoxic respiratory failure, treated with IV diuresis & Bipap. Tx to [facility] for further evaluation by HF team. Prolonged hospital course for CHF exacerbation, DENISHA on CKD now requiring HD, RRT/Code stroke 3/30 for lethargy and facial droop, CT head negative. Pulmonary called to consult for acute on chronic hypoxic/hypercapnic respiratory failure requiring Bipap.

## 2025-04-05 NOTE — PROGRESS NOTE ADULT - SUBJECTIVE AND OBJECTIVE BOX
Follow-up Pulm Progress Note    Feeling better overnight  Denies CP, SOB  Wore Bipap overnight     Medications:  MEDICATIONS  (STANDING):  allopurinol 100 milliGRAM(s) Oral daily  apixaban 2.5 milliGRAM(s) Oral two times a day  aspirin enteric coated 81 milliGRAM(s) Oral daily  atorvastatin 20 milliGRAM(s) Oral at bedtime  buMETAnide Injectable 2 milliGRAM(s) IV Push every 12 hours  calcium acetate 667 milliGRAM(s) Oral three times a day with meals  chlorhexidine 2% Cloths 1 Application(s) Topical daily  chlorhexidine 4% Liquid 1 Application(s) Topical <User Schedule>  dextrose 5%. 1000 milliLiter(s) (50 mL/Hr) IV Continuous <Continuous>  dextrose 5%. 1000 milliLiter(s) (100 mL/Hr) IV Continuous <Continuous>  dextrose 50% Injectable 25 Gram(s) IV Push once  dextrose 50% Injectable 12.5 Gram(s) IV Push once  dextrose 50% Injectable 25 Gram(s) IV Push once  epoetin lashawn (EPOGEN) Injectable 39300 Unit(s) IV Push <User Schedule>  fluticasone propionate/ salmeterol 250-50 MICROgram(s) Diskus 1 Dose(s) Inhalation two times a day  folic acid 1 milliGRAM(s) Oral daily  glucagon  Injectable 1 milliGRAM(s) IntraMuscular once  insulin glargine Injectable (LANTUS) 18 Unit(s) SubCutaneous at bedtime  insulin lispro (ADMELOG) corrective regimen sliding scale   SubCutaneous three times a day before meals  insulin lispro Injectable (ADMELOG) 3 Unit(s) SubCutaneous three times a day before meals  metoprolol succinate ER 25 milliGRAM(s) Oral daily  pantoprazole    Tablet 40 milliGRAM(s) Oral every 12 hours  polyethylene glycol 3350 17 Gram(s) Oral two times a day  psyllium Powder 1 Packet(s) Oral daily  sodium chloride 0.65% Nasal 1 Spray(s) Both Nostrils two times a day  tamsulosin 0.4 milliGRAM(s) Oral at bedtime  tiotropium 2.5 MICROgram(s) Inhaler 2 Puff(s) Inhalation daily    MEDICATIONS  (PRN):  acetaminophen     Tablet .. 650 milliGRAM(s) Oral every 6 hours PRN Temp greater or equal to 38C (100.4F), Mild Pain (1 - 3)  albuterol/ipratropium for Nebulization 3 milliLiter(s) Nebulizer every 6 hours PRN Shortness of Breath and/or Wheezing  bisacodyl 5 milliGRAM(s) Oral every 12 hours PRN Constipation  dextrose Oral Gel 15 Gram(s) Oral once PRN Blood Glucose LESS THAN 70 milliGRAM(s)/deciliter  dextrose Oral Gel 15 Gram(s) Oral once PRN Blood Glucose LESS THAN 70 milliGRAM(s)/deciliter  melatonin 3 milliGRAM(s) Oral at bedtime PRN Insomnia  ondansetron Injectable 4 milliGRAM(s) IV Push once PRN Nausea and/or Vomiting  ondansetron Injectable 4 milliGRAM(s) IV Push every 6 hours PRN Nausea and/or Vomiting  sodium chloride 0.9% lock flush 10 milliLiter(s) IV Push every 1 hour PRN Pre/post blood products, medications, blood draw, and to maintain line patency          Vital Signs Last 24 Hrs  T(C): 36.8 (05 Apr 2025 04:39), Max: 36.8 (04 Apr 2025 20:38)  T(F): 98.2 (05 Apr 2025 04:39), Max: 98.2 (04 Apr 2025 20:38)  HR: 78 (05 Apr 2025 08:01) (69 - 78)  BP: 124/78 (05 Apr 2025 04:39) (103/52 - 124/78)  BP(mean): --  RR: 18 (05 Apr 2025 04:39) (18 - 18)  SpO2: 98% (05 Apr 2025 08:01) (93% - 99%)    Parameters below as of 05 Apr 2025 04:39  Patient On (Oxygen Delivery Method): nasal cannula        ABG - ( 05 Apr 2025 05:26 )  pH, Arterial: 7.37  pH, Blood: x     /  pCO2: 52    /  pO2: 187   / HCO3: 30    / Base Excess: 3.6   /  SaO2: QNS               VBG pH 7.36 04-04 @ 07:04    VBG pCO2 48 04-04 @ 07:04    VBG O2 sat 99.4 04-04 @ 07:04    VBG lactate 2.5 04-04 @ 07:04      04-04 @ 07:01  -  04-05 @ 07:00  --------------------------------------------------------  IN: 0 mL / OUT: 2000 mL / NET: -2000 mL          LABS:                        10.0   10.71 )-----------( 117      ( 05 Apr 2025 04:59 )             34.3     04-05    136  |  97  |  38[H]  ----------------------------<  143[H]  4.0   |  27  |  4.77[H]    Ca    8.3[L]      05 Apr 2025 04:59    TPro  6.6  /  Alb  2.9[L]  /  TBili  0.6  /  DBili  x   /  AST  32  /  ALT  <5[L]  /  AlkPhos  227[H]  04-05          CAPILLARY BLOOD GLUCOSE      POCT Blood Glucose.: 144 mg/dL (05 Apr 2025 08:20)      Urinalysis Basic - ( 05 Apr 2025 04:59 )    Color: x / Appearance: x / SG: x / pH: x  Gluc: 143 mg/dL / Ketone: x  / Bili: x / Urobili: x   Blood: x / Protein: x / Nitrite: x   Leuk Esterase: x / RBC: x / WBC x   Sq Epi: x / Non Sq Epi: x / Bacteria: x                      CULTURES: (if applicable)    Culture - Blood (collected 03-30-25 @ 17:36)  Source: Blood Blood-Peripheral  Final Report (04-04-25 @ 22:00):    No growth at 5 days    Culture - Blood (collected 03-26-25 @ 12:31)  Source: Blood Blood-Peripheral  Final Report (03-31-25 @ 18:00):    No growth at 5 days    Culture - Blood (collected 03-26-25 @ 12:25)  Source: Blood Blood-Peripheral  Final Report (03-31-25 @ 18:00):    No growth at 5 days        Physical Examination:  PULM: Decreased BS   CVS: S1, S2 heard    RADIOLOGY REVIEWED  CXR: congestion  L effusion/atelectasis     TRANSTHORACIC ECHOCARDIOGRAM REPORT  ________________________________________________________________________________                                      _______       Pt. Name:       JO VARELA Study Date:    2/7/2025  MRN:            [id number]    YOB: 1945  Accession #:    [id number]     Age:           79 years  Account#:       [id number]  Gender:        M  Heart Rate:     63 bpm        Height:        71.00 in (180.34 cm)  Rhythm:         sinus rhythm  Weight:        242.50 lb (110.00 kg)  Blood Pressure: 106/67 mmHg   BSA/BMI:       2.29 m² / 33.82 kg/m²  ________________________________________________________________________________________  Referring Physician:    [id number] Aliyah Correa  Interpreting Physician: Jo Lin MD  Primary Sonographer:    Micah Pedraza [facility]  Fellow (Performing):    Ana M Noyola MD    CPT:                ECHO TTE WITH CON COMP W DOPP - .m;DEFINITY ECHO                      CONTRAST PER ML - .m;DEFINITY ECHO CONTRAST PER ML                      WASTED - .m  Indication(s):      Dyspnea, unspecified - R06.00  Procedure:          Transthoracic echocardiogram with 2-D, M-mode and complete                      spectral and color flow Doppler.  Ordering Location:  [facility]  Admission Status:   Inpatient  Contrast Injection: Verbal consent was obtained for injection of Ultrasonic                      Enhancing Agent following a discussion of risks and                      benefits.                      Endocardial visualization enhanced with 2 ml of Definity                      Ultrasound enhancing agent (Lot#:[id number] Exp.Date:2025-AUG                      Discarded Dose:8ml).  UEA Reaction:       Patient had no adverse reaction after injection of                      Ultrasound Enhancing Agent.  Study Information:  Image quality for this study is technically difficult.    _______________________________________________________________________________________     CONCLUSIONS:      1. Definity ultrasound enhancing agent was given for enhanced left ventricular opacification and improved delineation of the left ventricular endocardial borders.   2. Left ventricular wall thickness is normal. Left ventricular systolic function is severely decreased with an ejection fraction visually estimated at 30 %. Global left ventricular hypokinesis.   3. Multiple segmental abnormalities exist. See findings.   4. At least "moderate" mitral regurgitation.   5. 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 Trace intravalvular regurgitation.   6. Severely enlarged right ventricular cavity size and reduced right ventricular systolic function.   7. Probably severe tricuspid regurgitation.   8. Device lead is visualized in the right heart.    ________________________________________________________________________________________  FINDINGS:     Left Ventricle:  After obtaining consent, Definity ultrasound enhancing agent was given for enhanced left ventricular opacification and improved delineation of the left ventricular endocardial borders. Left ventricular wall thickness is normal. Left ventricular systolic function is severely decreased with an ejection fraction visually estimated at 30%. There is global left ventricular hypokinesis. There is no evidence of a left ventricular thrombus.  LV Wall Scoring: The entire apex is akinetic. The basal and mid anterior wall,  basal and mid anterolateral wall, basal and mid anterior septum, basal and mid  inferior septum, basal and mid inferior wall, and basal and mid inferolateral  wall are hypokinetic.          Right Ventricle:  The right ventricular cavity is severely enlarged in size and right ventricular systolic function is reduced. Tricuspid annular plane systolic excursion (TAPSE) is 1.0 cm (normal >=1.7 cm). Tricuspid annular tissue Doppler S' is 4.2 cm/s (normal >10 cm/s). A device lead is visualized in the right heart.     Left Atrium:  The left atrium is moderately dilated with an indexed volume of 43.69 ml/m².     Right Atrium:  The right atrium is severely dilated with an indexed volume of 48.50 ml/m².     Interatrial Septum:  The interatrial septum appears intact.     Aortic Valve:  A a transcatheter deployed (TAVR) is present in the aortic position. The prosthetic valve has normal function. The peak transaortic velocity is 2.00 m/s, peak transaortic gradient is 16.0 mmHg and mean transaortic gradient is 9.0 mmHg with an LVOT/aortic valve VTI ratio of 0.41. The effective orifice area is estimated at 1.99 cm² by the continuity equation. There is trace intravalvular regurgitation.     Mitral Valve:  There is at least "moderate" mitral regurgitation.     Tricuspid Valve:  There is probably severe tricuspid regurgitation. Estimated pulmonary artery systolic pressure is 52 mmHg, consistent with mild to moderate pulmonary hypertension.     Pulmonic Valve:  There is mild pulmonic regurgitation.     Aorta:  The aortic root appears normal in size.     Pericardium:  No pericardial effusion seen.     Systemic Veins:  The inferior vena cava is dilated measuring 2.35 cm in diameter, (dilated >2.1cm) with abnormal inspiratory collapse (abnormal <50%) consistent with elevated right atrial pressure (~15, range 10-20mmHg).  ____________________________________________________________________  QUANTITATIVE DATA:  Left Ventricle Measurements: (Indexed to BSA)     IVSd (2D):   1.1 cm  LVPWd (2D):  1.1 cm  LVIDd (2D):  5.3 cm  LVIDs (2D):  4.5 cm  LV Mass:     228 g  99.5 g/m²  Visualized LV EF%: 30%     MV E Vmax:    1.05 m/s  e' lateral:   4.73 cm/s  e' medial:    4.16 cm/s  E/e' lateral: 22.20  E/e' medial:  25.24  E/e' Average: 23.62    Aorta Measurements: (Normal range) (Indexed to BSA)     Ao Root d     3.60 cm (3.1 - 3.7 cm) 1.57 cm/m²  Ao Asc d, 2D: 3.60  Ao Asc prox:  3.60 cm                1.57 cm/m²  Ao Arch:      2.8 cm            Left Atrium Measurements: (Indexed to BSA)  LA Diam 2D:        5.70 cm  LA Vol s, MOD A4C: 85.80 ml.  LA Vol s, MOD A2C: 116.00 ml.  LA Vol s, MOD BP:  100.00 ml  43.69 ml/m²       Right Ventricle Measurements: Right Atrial Measurements:     TAPSE:           1.0 cm       RA Vol:            111.00 ml  RV Base (RVID1): 5.6 cm       RA Vol s, MOD A4C  111.0 ml  RV Mid (RVID2):  4.7 cm       RA Vol Index:      48.50 ml/m²                                RA Area s, MOD A4C 29.2 cm²       LVOT / RVOT/ Qp/Qs Data: (Indexed to BSA)  LVOT Diameter,s: 2.50 cm  LVOT Area:       4.91 cm²  LVOT Vmax:       0.92 m/s  LVOT Vmn:        0.622 m/s  LVOT VTI:        16.00 cm  LVOT peak grad:  3 mmHg  LVOT mean grad:  1.5 mmHg  LVOT SV:         78.5 ml   34.31 ml/m²    Aortic Valve Measurements:  AV Vmax:                2.0 m/s  AV Peak Gradient:       16.0 mmHg  AV Mean Gradient:       9.0 mmHg  AV VTI:                 39.4 cm  AV VTI Ratio:           0.41  AoV EOA, Contin:        1.99 cm²  AoV EOA, Contin i:      0.87 cm²/m²  AoV Dimensionless Index 0.41    Mitral Valve Measurements:     MV E Vmax: 1.0 m/s       Tricuspid Valve Measurements:     TV S'             4.2 cm/s  TR Vmax:          3.0 m/s  TR Peak Gradient: 36.7 mmHg  RA Pressure:      15 mmHg  PASP:             52 mmHg

## 2025-04-05 NOTE — PROGRESS NOTE ADULT - NS ATTEND OPT1 GEN_ALL_CORE
I independently performed the documented:
I attest my time as attending is greater than 50% of the total combined time spent on qualifying patient care activities by the PA/NP and attending.
I independently performed the documented:
I independently performed the documented:

## 2025-04-05 NOTE — PROGRESS NOTE ADULT - NS ATTEND AMEND GEN_ALL_CORE FT
79M CAD s/p CABG, HFrEF (LVIDd 5.2, LVEF 25-30%) w/ DC- ICD, AFib (Xarelto), severe AS s/p TAVR, COPD on 3-4L home O2, CKD3, HTN, T2DM, and BPH, presented to [facility] for ADHF, despite escalation of home diuretics. Transferred to [facility] for refractory volume overload.  GOC clarified and he remains DNR/DNI per wife - wants the ICD on. He was diuresed well with bumex drip augmented with diamox.  Remains volume OL. SCr up to ~2.9. Has moderate ascites. Now back on Bumex gtt as well as metolazone.    A bit incontinent of urine but mostly discharging into urinal.  UO measued at ~1.2L.    -Increase Bumex to 2 mg/hr and continue metolazone 5 mg po qd.  -Should d/c with px for Furoscix. Aim for [facility]IARA early next week.    D/w Dr. Newton.
79M CAD s/p CABG, HFrEF (LVIDd 5.2, LVEF 25-30%) w/ DC- ICD, AFib (Xarelto), severe AS s/p TAVR, COPD on 3-4L home O2, CKD3, HTN, T2DM, and BPH, presented to [facility] for ADHF, despite escalation of home diuretics. Now transferred to [facility] for refractory volume overload.  GOC clarified and he remains DNR/DNI per wife - wants the ICD on. He was being diuresed well with bumex drip augmented with diamox.  Remains volume OL. SCr up to ~2.7. Has moderate ascites.    -Change Bumex to 1 mg/hr. Add metolazone 5 mg po qd. Give one dose of Bumex 4 mg iv bolus.  -Should send out with Furoscix when discharged.    D/w hospital ACP.
79M CAD s/p CABG, HFrEF (LVIDd 5.2, LVEF 25-30%) w/ DC- ICD, AFib (Xarelto), severe AS s/p TAVR, COPD on 3-4L home O2, CKD3, HTN, T2DM, and BPH, presented to [facility] for ADHF, despite escalation of home diuretics. Now transferred to [facility] for refractory volume overload.  GOC clarified and he remains DNR/DNI per wife - wants the ICD on. He was being diuresed well with bumex drip augmented with diamox.  Remains volume OL. SCr up to 2.6.    Cardiac Studies    TTE 2/7/25 LVIDd 5.3cm, LVEF 30%, no LV thrombus, TAPSE 1.0cm, severely enlarged RV size/RVSF reduced, mod dilated LA, severe dilated RA, TAVR present, mod MR, severe TR, PASP 52, PASP 52, no pericardial effusion, IVC dilated 2.35cm    TTE 8/8/24; LVIDd 5.2, EF 25-30%, global hypokinesis, severe grade III DD, TASPE 1.1, TAVR present in AV position, mld-mod MR, mod TR, PASP 53    -Change Bumex to 3 mg po bid. Add metolazone 5 mg po x1 today and tomorrow.  -Should send out with Furoscix.
79M CAD s/p CABG, HFrEF (LVIDd 5.2, LVEF 25-30%) w/ DC- ICD, AFib (Xarelto), severe AS s/p TAVR, COPD on 3-4L home O2, CKD3, HTN, T2DM, and BPH, presented to [facility] for ADHF, despite escalation of home diuretics. Now transferred to [facility] for refractory volume overload.  GOC clarified and he remains DNR/DNI per wife - wants the ICD on. He was being diuresed well with bumex drip augmented with diamox.  Remains volume OL. SCr up to ~2.5.    -Change Bumex to 4 mg po bid. Add metolazone 5 mg po x1 today and tomorrow. Give one dose of Bumex 4 mg iv today.  -Should send out with Furoscix when discharged.    D/w Dr. Newton.
79M CAD s/p CABG, HFrEF (LVIDd 5.2, LVEF 25-30%) w/ DC- ICD, AFib (Xarelto), severe AS s/p TAVR, COPD on 3-4L home O2, CKD3, HTN, T2DM, and BPH, presented to [facility] for ADHF, despite escalation of home diuretics. Transferred to [facility] for refractory volume overload.  GOC clarified and he remains DNR/DNI per wife - wants the ICD on. He was diuresed well with bumex drip augmented with diamox.  Remains volume OL. SCr up to ~2.9. Has moderate ascites. Now back on Bumex gtt as well as metolazone.    Feels a bit better. C/o constipation.    -Continue Bumex 1 mg/hr and metolazone 5 mg po qd.  -Should d/c with px for Furoscix.    D/w Dr. Newton.
Mr. Solares 79/M with h/o HFrEF ef 25%, ICM, A.fib, s/p ICD, CKD 3, admitted with ADHF to [facility] and was on bumex drip/lasix drip with persistent volume overload so transferred to [facility] for further management.     exam: JVP 14, b/l LE edema    Plan:  rept TTE with severe BiV dysfunction, EF 30%, RV severe dysfunction, mod MR< severe TR  will replete potassium > 4 and Mg>2 before resuming bumex drip  repeat BMP this afternoon  will resume Bumex drip after above   BID labs   will add hydral 10mg + isordil 10mg TID for afterload    HF will follow
Mr. Solares 79/M with h/o HFrEF ef 25%, ICM, A.fib, s/p ICD, CKD 3, admitted with ADHF to [facility] and was on bumex drip/lasix drip with persistent volume overload so transferred to [facility] for further management. Diuresing well but remains somewhat volume overloaded. Ongoing GOC conversations, patient recently opted for DNR/DNI.    exam: 1+ b/l LE edema    Plan:  - continue Bumex @1, Diamox 500 daily, please check daily standing wt, dry wt is 243 lbs  - Toprol 50, hydral 10 TID, Isordil 10 TID  - please wrap LE in ACE bandages  - pallcare consult appreciated; patient and wife would like to keep ICD active    HF will follow.
Mr. Solares 79/M with h/o HFrEF ef 25%, ICM, A.fib, s/p ICD, CKD 3, admitted with ADHF to [facility] and was on bumex drip/lasix drip with persistent volume overload so transferred to [facility] for further management. Diuresing well but remains somewhat volume overloaded. Ongoing GOC conversations, patient recently opted for DNR/DNI.    exam: 1+ b/l LE edema    Plan:  - continue Bumex @1, Diamox 500 daily, please check daily standing wt, dry wt is 243 lbs  - Toprol 50, hydral 10 TID, Isordil 10 TID  - c/w LE ACE bandages  - pallcare consult appreciated; patient and wife would like to keep ICD active    HF will follow
Mr. Solares 79/M with h/o HFrEF ef 25%, ICM, A.fib, s/p ICD, CKD 3, admitted with ADHF to [facility] and was on bumex drip/lasix drip with persistent volume overload so transferred to [facility] for further management. Diuresing well but remains somewhat volume overloaded. Ongoing GOC conversations, patient recently opted for DNR/DNI.    exam: 1+ b/l LE edema    Plan:  - continue Bumex @1, please check daily standing wt, dry wt is 243 lbs  - Toprol 50, decrease hydral to 10 TID, c/w Isordil 10 TID  - pallcare consult appreciated; patient and wife would like to keep ICD active    HF will follow
Mr. Solares 79/M with h/o HFrEF ef 25%, ICM, A.fib, s/p ICD, CKD 3, admitted with ADHF to [facility] and was on bumex drip/lasix drip with persistent volume overload so transferred to [facility] for further management. Diuresing well but remains somewhat volume overloaded. Ongoing GOC conversations, patient recently opted for DNR/DNI.    exam: JVP 14, 1+ b/l LE edema    Plan:  - continue Bumex @1, please check daily standing wt, dry wt is 243 lbs  - Toprol 50, increase hydral to 25 TID, c/w Isordil 10 TID  - consider deactivating ICD function; pt and wife to discuss      HF will follow.
Mr. Solares 79/M with h/o HFrEF ef 25%, ICM, A.fib, s/p ICD, CKD 3, admitted with ADHF to [facility] and was on bumex drip/lasix drip with persistent volume overload so transferred to [facility] for further management. Diuresing well but remains somewhat volume overloaded. Ongoing GOC conversations, patient recently opted for DNR/DNI.  Some NSVT runs overnight and night prior, asymptomatic and HDS    exam: 1+ b/l LE edema    Plan:  - continue Bumex @1, add Diamox 500 daily, please check daily standing wt, dry wt is 243 lbs  - Toprol to 50, hydral 10 TID, Isordil 10 TID  - c/w LE ACE bandages  - pallcare consult appreciated; patient and wife would like to keep ICD active    HF will follow.
ABG this AM acceptable, reflective of Bipap 18/7/30%  Continue Bipap qHS and PRN daytime  Continue b dilators  Keep sat>90%
no

## 2025-04-05 NOTE — PROGRESS NOTE ADULT - PROBLEM SELECTOR PLAN 1
- Continue monitoring Cr and urine output.   - now on HD  - c/w bipap  - s/p tunneled HD catheter on 3/28  - renal follow up - Continue monitoring Cr and urine output.   - now on HD MWF  - c/w bipap qhs and prn  - s/p tunneled HD catheter on 3/28  - renal follow up

## 2025-04-05 NOTE — PROVIDER CONTACT NOTE (CRITICAL VALUE NOTIFICATION) - RECOMMENDATIONS
Continue to monitor
notify provider, review orders and meds
provider notified
Provider to assess patient at bedside and recommend any changes to plan of care.
PA aware.

## 2025-04-05 NOTE — PROGRESS NOTE ADULT - PROBLEM SELECTOR PLAN 1
2nd to underlying EMBER and likely OHS, COPD. Exacerbated in the setting of volume overload   -Pt on CPAP at home. Escalated to Bipap during this admission  -CXR with congestion, L effusion/atelectasis  -Keep O>I with HD, diuresis as tolerated  -Continue bronchodilators  -ABG this AM with acceptable pH but remains with elevated O2 levels, reflective of Bipap 18/7/30%  -Continue Bipap qHS and PRN daytime for now, may change to AVAPS. Will d/w Dr. Kevin   -Check bedside spirometry  -Keep sats >90% with O2, currently at baseline O2 requirements  -Will likely need Bipap vs NIV at home. 2nd to underlying EMBER and likely OHS, COPD. Exacerbated in the setting of volume overload   -Pt on CPAP at home. Escalated to Bipap during this admission  -CXR with congestion, L effusion/atelectasis  -Keep O>I with HD, diuresis as tolerated  -Continue bronchodilators  -ABG this AM acceptable, reflective of Bipap 18/7/30%  -Continue Bipap qHS and PRN daytime  -Check bedside spirometry  -Keep sats >90% with O2, currently at baseline O2 requirements  -Will likely need Bipap vs NIV at home.

## 2025-04-05 NOTE — PROVIDER CONTACT NOTE (CRITICAL VALUE NOTIFICATION) - SITUATION
Glucose 48
Hemoglobin 7.0
INR 1.13, , PCO2 83
C/o chest pain. EKG and trops ordered. Maalox given.
Mr Brando Stokes (lab) reported:  Arterial pH 7.23  Arterial PCo2 77  Arterial PO2 37
Pt has hx: of heart failure
pCO2 venous 84  Total CO2 venous 48  HCO3 venous 45    Lactate 2.7 Potassium 2.9
ABG drawn on pt 2/2 pt did not wear bipap HS overnight 2/2 vomiting and c/f aspiration, noted to be lethargic in AM and hard to arouse -- critical lab notification from ABG - PCO2 84, pH 7.8

## 2025-04-05 NOTE — PROVIDER CONTACT NOTE (CRITICAL VALUE NOTIFICATION) - TEST AND RESULT REPORTED:
Glucose 48
INR 1.13, , PCO2 83
31 Beats WCT for 0.12 sec
Arterial pH 7.23  Arterial PCo2 77  Arterial PO2 37
Hemoglobin 7.0
Trops 218
PCO2 84; pH 7.18
pCO2 venous 84  Total CO2 venous 48  HCO3 venous 45    Lactate 2.7 Potassium 2.9

## 2025-04-06 LAB
ALBUMIN SERPL ELPH-MCNC: 2.9 G/DL — LOW (ref 3.3–5)
ALP SERPL-CCNC: 232 U/L — HIGH (ref 40–120)
ALT FLD-CCNC: <5 U/L — LOW (ref 10–45)
ANION GAP SERPL CALC-SCNC: 19 MMOL/L — HIGH (ref 5–17)
AST SERPL-CCNC: 33 U/L — SIGNIFICANT CHANGE UP (ref 10–40)
BASE EXCESS BLDA CALC-SCNC: 2.1 MMOL/L — SIGNIFICANT CHANGE UP (ref -2–3)
BILIRUB SERPL-MCNC: 0.7 MG/DL — SIGNIFICANT CHANGE UP (ref 0.2–1.2)
BUN SERPL-MCNC: 49 MG/DL — HIGH (ref 7–23)
CALCIUM SERPL-MCNC: 8.3 MG/DL — LOW (ref 8.4–10.5)
CHLORIDE SERPL-SCNC: 95 MMOL/L — LOW (ref 96–108)
CO2 BLDA-SCNC: 30 MMOL/L — HIGH (ref 19–24)
CO2 SERPL-SCNC: 23 MMOL/L — SIGNIFICANT CHANGE UP (ref 22–31)
CREAT SERPL-MCNC: 5.3 MG/DL — HIGH (ref 0.5–1.3)
EGFR: 10 ML/MIN/1.73M2 — LOW
EGFR: 10 ML/MIN/1.73M2 — LOW
GAS PNL BLDA: SIGNIFICANT CHANGE UP
GLUCOSE BLDC GLUCOMTR-MCNC: 143 MG/DL — HIGH (ref 70–99)
GLUCOSE BLDC GLUCOMTR-MCNC: 147 MG/DL — HIGH (ref 70–99)
GLUCOSE BLDC GLUCOMTR-MCNC: 188 MG/DL — HIGH (ref 70–99)
GLUCOSE BLDC GLUCOMTR-MCNC: 194 MG/DL — HIGH (ref 70–99)
GLUCOSE SERPL-MCNC: 137 MG/DL — HIGH (ref 70–99)
HCO3 BLDA-SCNC: 29 MMOL/L — HIGH (ref 21–28)
HCT VFR BLD CALC: 36.1 % — LOW (ref 39–50)
HGB BLD-MCNC: 10.3 G/DL — LOW (ref 13–17)
MCHC RBC-ENTMCNC: 25.7 PG — LOW (ref 27–34)
MCHC RBC-ENTMCNC: 28.5 G/DL — LOW (ref 32–36)
MCV RBC AUTO: 90 FL — SIGNIFICANT CHANGE UP (ref 80–100)
NRBC BLD AUTO-RTO: 0 /100 WBCS — SIGNIFICANT CHANGE UP (ref 0–0)
PCO2 BLDA: 52 MMHG — HIGH (ref 35–48)
PH BLDA: 7.35 — SIGNIFICANT CHANGE UP (ref 7.35–7.45)
PLATELET # BLD AUTO: 119 K/UL — LOW (ref 150–400)
PO2 BLDA: 113 MMHG — HIGH (ref 83–108)
POTASSIUM SERPL-MCNC: 4.3 MMOL/L — SIGNIFICANT CHANGE UP (ref 3.5–5.3)
POTASSIUM SERPL-SCNC: 4.3 MMOL/L — SIGNIFICANT CHANGE UP (ref 3.5–5.3)
PROT SERPL-MCNC: 6.7 G/DL — SIGNIFICANT CHANGE UP (ref 6–8.3)
RBC # BLD: 4.01 M/UL — LOW (ref 4.2–5.8)
RBC # FLD: 18.7 % — HIGH (ref 10.3–14.5)
SAO2 % BLDA: 98.3 % — HIGH (ref 94–98)
SODIUM SERPL-SCNC: 137 MMOL/L — SIGNIFICANT CHANGE UP (ref 135–145)
VIT B1 SERPL-MCNC: 218 NMOL/L — HIGH (ref 66.5–200)
WBC # BLD: 12.07 K/UL — HIGH (ref 3.8–10.5)
WBC # FLD AUTO: 12.07 K/UL — HIGH (ref 3.8–10.5)

## 2025-04-06 PROCEDURE — 99232 SBSQ HOSP IP/OBS MODERATE 35: CPT

## 2025-04-06 RX ADMIN — Medication 40 MILLIGRAM(S): at 06:19

## 2025-04-06 RX ADMIN — Medication 81 MILLIGRAM(S): at 11:30

## 2025-04-06 RX ADMIN — BUMETANIDE 2 MILLIGRAM(S): 1 TABLET ORAL at 06:20

## 2025-04-06 RX ADMIN — TAMSULOSIN HYDROCHLORIDE 0.4 MILLIGRAM(S): 0.4 CAPSULE ORAL at 21:41

## 2025-04-06 RX ADMIN — TIOTROPIUM BROMIDE INHALATION SPRAY 2 PUFF(S): 3.12 SPRAY, METERED RESPIRATORY (INHALATION) at 17:24

## 2025-04-06 RX ADMIN — POLYETHYLENE GLYCOL 3350 17 GRAM(S): 17 POWDER, FOR SOLUTION ORAL at 06:19

## 2025-04-06 RX ADMIN — INSULIN LISPRO 3 UNIT(S): 100 INJECTION, SOLUTION INTRAVENOUS; SUBCUTANEOUS at 08:43

## 2025-04-06 RX ADMIN — METOPROLOL SUCCINATE 25 MILLIGRAM(S): 50 TABLET, EXTENDED RELEASE ORAL at 06:19

## 2025-04-06 RX ADMIN — Medication 1 SPRAY(S): at 06:20

## 2025-04-06 RX ADMIN — INSULIN GLARGINE-YFGN 18 UNIT(S): 100 INJECTION, SOLUTION SUBCUTANEOUS at 21:42

## 2025-04-06 RX ADMIN — IPRATROPIUM BROMIDE AND ALBUTEROL SULFATE 3 MILLILITER(S): .5; 2.5 SOLUTION RESPIRATORY (INHALATION) at 11:31

## 2025-04-06 RX ADMIN — INSULIN LISPRO 2: 100 INJECTION, SOLUTION INTRAVENOUS; SUBCUTANEOUS at 17:26

## 2025-04-06 RX ADMIN — INSULIN LISPRO 2: 100 INJECTION, SOLUTION INTRAVENOUS; SUBCUTANEOUS at 12:50

## 2025-04-06 RX ADMIN — Medication 40 MILLIGRAM(S): at 17:26

## 2025-04-06 RX ADMIN — APIXABAN 2.5 MILLIGRAM(S): 2.5 TABLET, FILM COATED ORAL at 06:18

## 2025-04-06 RX ADMIN — Medication 1 DOSE(S): at 06:20

## 2025-04-06 RX ADMIN — Medication 667 MILLIGRAM(S): at 08:44

## 2025-04-06 RX ADMIN — ATORVASTATIN CALCIUM 20 MILLIGRAM(S): 80 TABLET, FILM COATED ORAL at 21:41

## 2025-04-06 RX ADMIN — Medication 100 MILLIGRAM(S): at 11:30

## 2025-04-06 RX ADMIN — INSULIN LISPRO 3 UNIT(S): 100 INJECTION, SOLUTION INTRAVENOUS; SUBCUTANEOUS at 17:26

## 2025-04-06 RX ADMIN — APIXABAN 2.5 MILLIGRAM(S): 2.5 TABLET, FILM COATED ORAL at 17:25

## 2025-04-06 RX ADMIN — IPRATROPIUM BROMIDE AND ALBUTEROL SULFATE 3 MILLILITER(S): .5; 2.5 SOLUTION RESPIRATORY (INHALATION) at 23:30

## 2025-04-06 RX ADMIN — Medication 1 PACKET(S): at 22:15

## 2025-04-06 RX ADMIN — Medication 667 MILLIGRAM(S): at 17:49

## 2025-04-06 RX ADMIN — Medication 1 APPLICATION(S): at 06:21

## 2025-04-06 RX ADMIN — POLYETHYLENE GLYCOL 3350 17 GRAM(S): 17 POWDER, FOR SOLUTION ORAL at 17:24

## 2025-04-06 RX ADMIN — INSULIN LISPRO 3 UNIT(S): 100 INJECTION, SOLUTION INTRAVENOUS; SUBCUTANEOUS at 12:50

## 2025-04-06 RX ADMIN — IPRATROPIUM BROMIDE AND ALBUTEROL SULFATE 3 MILLILITER(S): .5; 2.5 SOLUTION RESPIRATORY (INHALATION) at 06:21

## 2025-04-06 RX ADMIN — Medication 1 DOSE(S): at 17:25

## 2025-04-06 RX ADMIN — Medication 667 MILLIGRAM(S): at 12:51

## 2025-04-06 RX ADMIN — Medication 1 SPRAY(S): at 17:27

## 2025-04-06 RX ADMIN — BUMETANIDE 2 MILLIGRAM(S): 1 TABLET ORAL at 17:25

## 2025-04-06 RX ADMIN — FOLIC ACID 1 MILLIGRAM(S): 1 TABLET ORAL at 11:31

## 2025-04-06 RX ADMIN — Medication 1 APPLICATION(S): at 11:34

## 2025-04-06 RX ADMIN — IPRATROPIUM BROMIDE AND ALBUTEROL SULFATE 3 MILLILITER(S): .5; 2.5 SOLUTION RESPIRATORY (INHALATION) at 17:27

## 2025-04-06 NOTE — PROGRESS NOTE ADULT - PROBLEM SELECTOR PLAN 1
- Continue monitoring Cr and urine output.   - now on HD MWF  - c/w bipap qhs and prn  - s/p tunneled HD catheter on 3/28  - renal follow up

## 2025-04-06 NOTE — PROGRESS NOTE ADULT - SUBJECTIVE AND OBJECTIVE BOX
[facility] Division of Hospital Medicine  Aashish Moya MD  Available via MS Teams    SUBJECTIVE / OVERNIGHT EVENTS:  Patient denies any complaints overnight. The patient denies any fevers, chills, nausea, vomiting, or increased pain.    MEDICATIONS  (STANDING):  albuterol/ipratropium for Nebulization 3 milliLiter(s) Nebulizer every 6 hours  allopurinol 100 milliGRAM(s) Oral daily  apixaban 2.5 milliGRAM(s) Oral two times a day  aspirin enteric coated 81 milliGRAM(s) Oral daily  atorvastatin 20 milliGRAM(s) Oral at bedtime  buMETAnide Injectable 2 milliGRAM(s) IV Push every 12 hours  calcium acetate 667 milliGRAM(s) Oral three times a day with meals  chlorhexidine 2% Cloths 1 Application(s) Topical daily  chlorhexidine 4% Liquid 1 Application(s) Topical <User Schedule>  dextrose 5%. 1000 milliLiter(s) (50 mL/Hr) IV Continuous <Continuous>  dextrose 5%. 1000 milliLiter(s) (100 mL/Hr) IV Continuous <Continuous>  dextrose 50% Injectable 25 Gram(s) IV Push once  dextrose 50% Injectable 12.5 Gram(s) IV Push once  dextrose 50% Injectable 25 Gram(s) IV Push once  epoetin lashawn (EPOGEN) Injectable 35436 Unit(s) IV Push <User Schedule>  fluticasone propionate/ salmeterol 250-50 MICROgram(s) Diskus 1 Dose(s) Inhalation two times a day  folic acid 1 milliGRAM(s) Oral daily  glucagon  Injectable 1 milliGRAM(s) IntraMuscular once  insulin glargine Injectable (LANTUS) 18 Unit(s) SubCutaneous at bedtime  insulin lispro (ADMELOG) corrective regimen sliding scale   SubCutaneous three times a day before meals  insulin lispro Injectable (ADMELOG) 3 Unit(s) SubCutaneous three times a day before meals  metoprolol succinate ER 25 milliGRAM(s) Oral daily  pantoprazole    Tablet 40 milliGRAM(s) Oral every 12 hours  polyethylene glycol 3350 17 Gram(s) Oral two times a day  psyllium Powder 1 Packet(s) Oral daily  sodium chloride 0.65% Nasal 1 Spray(s) Both Nostrils two times a day  tamsulosin 0.4 milliGRAM(s) Oral at bedtime  tiotropium 2.5 MICROgram(s) Inhaler 2 Puff(s) Inhalation daily    MEDICATIONS  (PRN):  acetaminophen     Tablet .. 650 milliGRAM(s) Oral every 6 hours PRN Temp greater or equal to 38C (100.4F), Mild Pain (1 - 3)  bisacodyl 5 milliGRAM(s) Oral every 12 hours PRN Constipation  dextrose Oral Gel 15 Gram(s) Oral once PRN Blood Glucose LESS THAN 70 milliGRAM(s)/deciliter  dextrose Oral Gel 15 Gram(s) Oral once PRN Blood Glucose LESS THAN 70 milliGRAM(s)/deciliter  melatonin 3 milliGRAM(s) Oral at bedtime PRN Insomnia  ondansetron Injectable 4 milliGRAM(s) IV Push once PRN Nausea and/or Vomiting  ondansetron Injectable 4 milliGRAM(s) IV Push every 6 hours PRN Nausea and/or Vomiting  sodium chloride 0.9% lock flush 10 milliLiter(s) IV Push every 1 hour PRN Pre/post blood products, medications, blood draw, and to maintain line patency      I&O's Summary    05 Apr 2025 07:01  -  06 Apr 2025 07:00  --------------------------------------------------------  IN: 520 mL / OUT: 0 mL / NET: 520 mL        PHYSICAL EXAM:  Vital Signs Last 24 Hrs  T(C): 36.7 (06 Apr 2025 04:15), Max: 36.8 (05 Apr 2025 19:52)  T(F): 98.1 (06 Apr 2025 04:15), Max: 98.3 (05 Apr 2025 19:52)  HR: 72 (06 Apr 2025 08:47) (64 - 82)  BP: 110/65 (06 Apr 2025 08:07) (98/60 - 117/69)  BP(mean): 85 (05 Apr 2025 11:49) (85 - 85)  RR: 18 (06 Apr 2025 08:07) (18 - 18)  SpO2: 96% (06 Apr 2025 08:47) (96% - 99%)    Parameters below as of 06 Apr 2025 08:07  Patient On (Oxygen Delivery Method): nasal cannula  O2 Flow (L/min): 3    GENERAL: NAD, obese   CHEST/LUNG: Clear to auscultation bilaterally; No wheeze  HEART: Regular rate and rhythm; S1S2  ABDOMEN: Soft, Nontender, distended; Bowel sounds present  EXTREMITIES:  anasarca       LABS:                        10.3   12.07 )-----------( 119      ( 06 Apr 2025 06:59 )             36.1     04-06    137  |  95[L]  |  49[H]  ----------------------------<  137[H]  4.3   |  23  |  5.30[H]    Ca    8.3[L]      06 Apr 2025 06:54    TPro  6.7  /  Alb  2.9[L]  /  TBili  0.7  /  DBili  x   /  AST  33  /  ALT  <5[L]  /  AlkPhos  232[H]  04-06          Urinalysis Basic - ( 06 Apr 2025 06:54 )    Color: x / Appearance: x / SG: x / pH: x  Gluc: 137 mg/dL / Ketone: x  / Bili: x / Urobili: x   Blood: x / Protein: x / Nitrite: x   Leuk Esterase: x / RBC: x / WBC x   Sq Epi: x / Non Sq Epi: x / Bacteria: x        COVID-19 PCR: NotDetec (25 Mar 2025 14:00)  SARS-CoV-2: NotDetec (10 Tay 2025 19:29)      RADIOLOGY & ADDITIONAL TESTS:  New Imaging Personally Reviewed Today:  New Electrocardiogram Personally Reviewed Today:  Other Results Reviewed Today:   Prior or Outpatient Records Reviewed Today with Summary:    COORDINATION OF CARE:  Consultant Communication and Details of Discussion (where applicable):

## 2025-04-06 NOTE — PROGRESS NOTE ADULT - ASSESSMENT
80 y/o male w/ PMHx CAD s/p CABG, HF (combined HFrEF [EF 25%] and HFpEF [G3DD]) w/ ICD, AFib on Xarelto, severe AS s/p TAVR, COPD on 3-4L home O2, CKD4, HTN, HLD, T2DM, and BPH who presents as a transfer from [facility] for HF evaluation. Ongoing CHF exacerbation now off bumex gtt, new DENISHA on CKD likely 2/2 ongoing cardiorenal and possible component of abdominal compartment syndrome given tremendous ascites, now s/p tunnelled HD catheter.  RRT/Code Stroke on 3/30/25 for lethargy and facial droop, CT negative, likely in the setting of hypercarbia, placed on bipap with improvement.

## 2025-04-06 NOTE — PROGRESS NOTE ADULT - PROBLEM SELECTOR PLAN 3
- EF 30% with severe tricuspid regurg and PASP 55mmHG on 2/7/24. Dry wt is 243 lbs.   - c/w bumex 2mg IV bid   - GDMT:  Metoprolol succinate 50mg day. Holding entresto and aldactone for DENISHA. Not on SGLT2.  - Hydralazine/Nitrate: hydral 50mg TID. Isordil 20mg TID   - patient declined RHC and cardiomems  - per GOC with wife; pt dnr/dni but they wish to keep ICD active

## 2025-04-07 DIAGNOSIS — Z29.9 ENCOUNTER FOR PROPHYLACTIC MEASURES, UNSPECIFIED: ICD-10-CM

## 2025-04-07 DIAGNOSIS — G93.41 METABOLIC ENCEPHALOPATHY: ICD-10-CM

## 2025-04-07 LAB
ANION GAP SERPL CALC-SCNC: 20 MMOL/L — HIGH (ref 5–17)
BUN SERPL-MCNC: 55 MG/DL — HIGH (ref 7–23)
CALCIUM SERPL-MCNC: 8.4 MG/DL — SIGNIFICANT CHANGE UP (ref 8.4–10.5)
CHLORIDE SERPL-SCNC: 94 MMOL/L — LOW (ref 96–108)
CO2 SERPL-SCNC: 21 MMOL/L — LOW (ref 22–31)
CREAT SERPL-MCNC: 5.77 MG/DL — HIGH (ref 0.5–1.3)
EGFR: 9 ML/MIN/1.73M2 — LOW
EGFR: 9 ML/MIN/1.73M2 — LOW
GLUCOSE BLDC GLUCOMTR-MCNC: 145 MG/DL — HIGH (ref 70–99)
GLUCOSE BLDC GLUCOMTR-MCNC: 162 MG/DL — HIGH (ref 70–99)
GLUCOSE BLDC GLUCOMTR-MCNC: 172 MG/DL — HIGH (ref 70–99)
GLUCOSE BLDC GLUCOMTR-MCNC: 174 MG/DL — HIGH (ref 70–99)
GLUCOSE SERPL-MCNC: 107 MG/DL — HIGH (ref 70–99)
MAGNESIUM SERPL-MCNC: 2.5 MG/DL — SIGNIFICANT CHANGE UP (ref 1.6–2.6)
PHOSPHATE SERPL-MCNC: 5.2 MG/DL — HIGH (ref 2.5–4.5)
POTASSIUM SERPL-MCNC: 4.7 MMOL/L — SIGNIFICANT CHANGE UP (ref 3.5–5.3)
POTASSIUM SERPL-SCNC: 4.7 MMOL/L — SIGNIFICANT CHANGE UP (ref 3.5–5.3)
SODIUM SERPL-SCNC: 135 MMOL/L — SIGNIFICANT CHANGE UP (ref 135–145)

## 2025-04-07 PROCEDURE — 94010 BREATHING CAPACITY TEST: CPT | Mod: 26

## 2025-04-07 PROCEDURE — 99233 SBSQ HOSP IP/OBS HIGH 50: CPT

## 2025-04-07 RX ADMIN — IPRATROPIUM BROMIDE AND ALBUTEROL SULFATE 3 MILLILITER(S): .5; 2.5 SOLUTION RESPIRATORY (INHALATION) at 23:08

## 2025-04-07 RX ADMIN — Medication 40 MILLIGRAM(S): at 05:45

## 2025-04-07 RX ADMIN — Medication 81 MILLIGRAM(S): at 11:19

## 2025-04-07 RX ADMIN — APIXABAN 2.5 MILLIGRAM(S): 2.5 TABLET, FILM COATED ORAL at 05:45

## 2025-04-07 RX ADMIN — Medication 1 APPLICATION(S): at 11:20

## 2025-04-07 RX ADMIN — Medication 100 MILLIGRAM(S): at 11:20

## 2025-04-07 RX ADMIN — APIXABAN 2.5 MILLIGRAM(S): 2.5 TABLET, FILM COATED ORAL at 17:02

## 2025-04-07 RX ADMIN — INSULIN LISPRO 2: 100 INJECTION, SOLUTION INTRAVENOUS; SUBCUTANEOUS at 22:10

## 2025-04-07 RX ADMIN — INSULIN LISPRO 3 UNIT(S): 100 INJECTION, SOLUTION INTRAVENOUS; SUBCUTANEOUS at 12:49

## 2025-04-07 RX ADMIN — TIOTROPIUM BROMIDE INHALATION SPRAY 2 PUFF(S): 3.12 SPRAY, METERED RESPIRATORY (INHALATION) at 17:03

## 2025-04-07 RX ADMIN — Medication 1 DOSE(S): at 05:45

## 2025-04-07 RX ADMIN — IPRATROPIUM BROMIDE AND ALBUTEROL SULFATE 3 MILLILITER(S): .5; 2.5 SOLUTION RESPIRATORY (INHALATION) at 17:01

## 2025-04-07 RX ADMIN — POLYETHYLENE GLYCOL 3350 17 GRAM(S): 17 POWDER, FOR SOLUTION ORAL at 05:46

## 2025-04-07 RX ADMIN — Medication 40 MILLIGRAM(S): at 17:03

## 2025-04-07 RX ADMIN — ATORVASTATIN CALCIUM 20 MILLIGRAM(S): 80 TABLET, FILM COATED ORAL at 22:13

## 2025-04-07 RX ADMIN — INSULIN LISPRO 3 UNIT(S): 100 INJECTION, SOLUTION INTRAVENOUS; SUBCUTANEOUS at 22:10

## 2025-04-07 RX ADMIN — Medication 1 SPRAY(S): at 05:43

## 2025-04-07 RX ADMIN — FOLIC ACID 1 MILLIGRAM(S): 1 TABLET ORAL at 11:20

## 2025-04-07 RX ADMIN — METOPROLOL SUCCINATE 25 MILLIGRAM(S): 50 TABLET, EXTENDED RELEASE ORAL at 05:45

## 2025-04-07 RX ADMIN — Medication 1 DOSE(S): at 17:01

## 2025-04-07 RX ADMIN — BUMETANIDE 2 MILLIGRAM(S): 1 TABLET ORAL at 22:42

## 2025-04-07 RX ADMIN — IPRATROPIUM BROMIDE AND ALBUTEROL SULFATE 3 MILLILITER(S): .5; 2.5 SOLUTION RESPIRATORY (INHALATION) at 05:45

## 2025-04-07 RX ADMIN — INSULIN LISPRO 2: 100 INJECTION, SOLUTION INTRAVENOUS; SUBCUTANEOUS at 12:48

## 2025-04-07 RX ADMIN — TAMSULOSIN HYDROCHLORIDE 0.4 MILLIGRAM(S): 0.4 CAPSULE ORAL at 22:13

## 2025-04-07 RX ADMIN — Medication 667 MILLIGRAM(S): at 11:51

## 2025-04-07 RX ADMIN — Medication 1 SPRAY(S): at 17:02

## 2025-04-07 RX ADMIN — Medication 667 MILLIGRAM(S): at 08:43

## 2025-04-07 RX ADMIN — Medication 667 MILLIGRAM(S): at 22:13

## 2025-04-07 RX ADMIN — INSULIN GLARGINE-YFGN 18 UNIT(S): 100 INJECTION, SOLUTION SUBCUTANEOUS at 22:42

## 2025-04-07 RX ADMIN — IPRATROPIUM BROMIDE AND ALBUTEROL SULFATE 3 MILLILITER(S): .5; 2.5 SOLUTION RESPIRATORY (INHALATION) at 11:19

## 2025-04-07 RX ADMIN — Medication 1 APPLICATION(S): at 05:43

## 2025-04-07 RX ADMIN — EPOETIN ALFA 10000 UNIT(S): 10000 SOLUTION INTRAVENOUS; SUBCUTANEOUS at 18:55

## 2025-04-07 RX ADMIN — Medication 1 PACKET(S): at 22:42

## 2025-04-07 RX ADMIN — INSULIN LISPRO 3 UNIT(S): 100 INJECTION, SOLUTION INTRAVENOUS; SUBCUTANEOUS at 08:42

## 2025-04-07 RX ADMIN — BUMETANIDE 2 MILLIGRAM(S): 1 TABLET ORAL at 05:45

## 2025-04-07 NOTE — PROGRESS NOTE ADULT - PROBLEM SELECTOR PLAN 1
2nd to underlying COPD. Exacerbated in the setting of volume overload   -CXR with congestion, L effusion/atelectasis  -Keep O>I with HD, diuresis as tolerated  -Continue bronchodilators  -ABGs reviewed. Pt w/ hx EMBER on CPAP at home, failed CPAP this admission and now escalated to Bipap   -ABG this 4/5 acceptable, reflective of Bipap 18/7/30%. Continue Bipap qHS and PRN daytime   -Keep sats >90% with O2  -Overnight pulse oximetry study ordered for tonight 4/7-4/8. Study to be completed on 3LNC (pt's baseline O2 requirements).  -Pt will need Bipap on discharge.

## 2025-04-07 NOTE — PROGRESS NOTE ADULT - ASSESSMENT
80 y/o male w/ PMHx CAD s/p CABG, HF (combined HFrEF [EF 25%] and HFpEF [G3DD]) w/ ICD, AFib on Xarelto, severe AS s/p TAVR, COPD on 3-4L home O2, CKD4, HTN, HLD, T2DM, and BPH who presents as a transfer from [facility] for HF evaluation. Ongoing CHF exacerbation now off bumex gtt, new DENISHA on CKD likely 2/2 ongoing cardiorenal and possible component of abdominal compartment syndrome given tremendous ascites, now s/p tunnelled HD catheter.  RRT/Code Stroke on 3/30/25 for lethargy and facial droop, CT negative, likely in the setting of hypercarbia, placed on bipap with improvement.

## 2025-04-07 NOTE — CHART NOTE - NSCHARTNOTEFT_GEN_A_CORE
NUTRITION FOLLOW UP NOTE    PATIENT SEEN FOR: Follow up.     SOURCE: [] Patient  [x] Current Medical Record  [] RN  [] Family/support person at bedside  [x] Patient unavailable  [x] Other: Team    CHART REVIEWED/EVENTS NOTED.  [] No changes to nutrition care plan to note  [x] Nutrition Status:  - S/p tunneled HD catheter placement 3/28.  - S/p RRT for lethargy 3/30.   - Neph: DENISHA on CKD; on HD. Last HD .   - HFrEF, COPD.     DIET ORDER:   Diet, Renal Restrictions:   For patients receiving Renal Replacement - No Protein Restr, No Conc K, No Conc Phos, Low Sodium  Consistent Carbohydrate {Evening Snack} (CSTCHOSN)  1000mL Fluid Restriction ([id number])  Supplement Feeding Modality:  Oral  Nepro Cans or Servings Per Day:  3       Frequency:  Daily (25)      CURRENT DIET ORDER IS:  [x] Other: See recommendations detailed below.     NUTRITION INTAKE/PROVISION:  [x] PO: RD observed pt's breakfast tray ~50% consumed at bedside during time of visit. Flowsheets indicate pt with PO intake varying from 0-100% with feeding assistance provided. RD observed multiple unopened Nepro oral nutrition supplements at bedside.     ANTHROPOMETRICS:  Drug Dosing Weight  Height (cm): 177.8 (10 Tay 2025 15:59)  Weight (kg): 107 (28 Mar 2025 14:14)  BMI (kg/m2): 33.8 (28 Mar 2025 14:14)  Weights:   Daily Weight in k.6 (-), Weight in k.4 (-), Weight in k (-), Weight in k.4 (-), Weight in k.9 (), Weight in k (-), Weight in k ()        -- Weight fluctuations possible partially in setting of fluid shifts (pt on HD, with severe edema).     MEDICATIONS:  MEDICATIONS  (STANDING):  albuterol/ipratropium for Nebulization 3 milliLiter(s) Nebulizer every 6 hours  allopurinol 100 milliGRAM(s) Oral daily  apixaban 2.5 milliGRAM(s) Oral two times a day  aspirin enteric coated 81 milliGRAM(s) Oral daily  atorvastatin 20 milliGRAM(s) Oral at bedtime  buMETAnide Injectable 2 milliGRAM(s) IV Push every 12 hours  calcium acetate 667 milliGRAM(s) Oral three times a day with meals  chlorhexidine 2% Cloths 1 Application(s) Topical daily  chlorhexidine 4% Liquid 1 Application(s) Topical <User Schedule>  dextrose 5%. 1000 milliLiter(s) (50 mL/Hr) IV Continuous <Continuous>  dextrose 5%. 1000 milliLiter(s) (100 mL/Hr) IV Continuous <Continuous>  dextrose 50% Injectable 25 Gram(s) IV Push once  dextrose 50% Injectable 12.5 Gram(s) IV Push once  dextrose 50% Injectable 25 Gram(s) IV Push once  epoetin lashawn (EPOGEN) Injectable 25961 Unit(s) IV Push <User Schedule>  fluticasone propionate/ salmeterol 250-50 MICROgram(s) Diskus 1 Dose(s) Inhalation two times a day  folic acid 1 milliGRAM(s) Oral daily  glucagon  Injectable 1 milliGRAM(s) IntraMuscular once  insulin glargine Injectable (LANTUS) 18 Unit(s) SubCutaneous at bedtime  insulin lispro (ADMELOG) corrective regimen sliding scale   SubCutaneous three times a day before meals  insulin lispro Injectable (ADMELOG) 3 Unit(s) SubCutaneous three times a day before meals  metoprolol succinate ER 25 milliGRAM(s) Oral daily  pantoprazole    Tablet 40 milliGRAM(s) Oral every 12 hours  polyethylene glycol 3350 17 Gram(s) Oral two times a day  psyllium Powder 1 Packet(s) Oral daily  sodium chloride 0.65% Nasal 1 Spray(s) Both Nostrils two times a day  tamsulosin 0.4 milliGRAM(s) Oral at bedtime  tiotropium 2.5 MICROgram(s) Inhaler 2 Puff(s) Inhalation daily    MEDICATIONS  (PRN):  acetaminophen     Tablet .. 650 milliGRAM(s) Oral every 6 hours PRN Temp greater or equal to 38C (100.4F), Mild Pain (1 - 3)  bisacodyl 5 milliGRAM(s) Oral every 12 hours PRN Constipation  dextrose Oral Gel 15 Gram(s) Oral once PRN Blood Glucose LESS THAN 70 milliGRAM(s)/deciliter  dextrose Oral Gel 15 Gram(s) Oral once PRN Blood Glucose LESS THAN 70 milliGRAM(s)/deciliter  melatonin 3 milliGRAM(s) Oral at bedtime PRN Insomnia  ondansetron Injectable 4 milliGRAM(s) IV Push every 6 hours PRN Nausea and/or Vomiting  ondansetron Injectable 4 milliGRAM(s) IV Push once PRN Nausea and/or Vomiting  sodium chloride 0.9% lock flush 10 milliLiter(s) IV Push every 1 hour PRN Pre/post blood products, medications, blood draw, and to maintain line patency      NUTRITIONALLY PERTINENT LABS:   Na135 mmol/L Glu 107 mg/dL[H] K+ 4.7 mmol/L Cr  5.77 mg/dL[H] BUN 55 mg/dL[H]  Phos 5.2 mg/dL[H]  Alb 2.9 g/dL[L] ALT <5 U/L[L] AST 33 U/L Alkaline Phosphatase 232 U/L[H]    A1C with Estimated Average Glucose Result: 7.2 % (25 @ 07:55)      Finger Sticks:  POCT Blood Glucose.: 145 mg/dL ( @ 08:36)  POCT Blood Glucose.: 147 mg/dL ( @ 21:27)  POCT Blood Glucose.: 188 mg/dL ( @ 17:21)  POCT Blood Glucose.: 194 mg/dL ( @ 12:49)      NUTRITIONALLY PERTINENT MEDICATIONS/LABS:  [x] Reviewed  [x] Relevant notes on medications/labs:  - Lantus, Admelog, SSI for coverage.   - Diuresing with IV Bumex.   - Phoslo ordered - Hyperphosphatemia noted.   - Hypernatremia noted.   - Micronutrient supplementation in-house: folic acid.     EDEMA:  [x] Reviewed  [x] Relevant notes: Per flowsheets:  +1 generalized  +3 L leg, R/L ankle, R/L foot  +4 L arm/hand    GI/ I&O:  [x] Reviewed  [x] Relevant notes: Last BM  per chart; bowel regimen: miralax, dulcolax, Metamucil.     SKIN:   [x] No pressure injuries documented, per nursing flowsheet    ESTIMATED NEEDS:  [x] Updated (with consideration for HD):  Energy: 5946-3697 kcal/day (30-35 kcal/kg)  Protein: .5 g/day (1.2-1.4 g/kg)  Fluid: [x] defer to team  Based on: IBW 77.5 kg    NUTRITION DIAGNOSIS:  [x] Prior Dx: Food & Nutrition Related Knowledge Deficit; Inadequate Protein Energy Intake; Increased Nutrient Needs     EDUCATION:  [x] Not feasible at this time.     NUTRITION CARE PLAN:  1. Diet: Continue Consistent Carbohydrate, Renal diet as tolerated. defer texture/consistency to SLP/team.         -- Fluid restriction deferred to team.   2. Supplements: Consider decrease frequency of Nepro oral nutrition supplements daily pending pt preference.   3. Multivitamin/mineral supplementation: Recommend nephro-neelam daily pending no medical contraindications.     [] Achieved - Continue current nutrition intervention(s)  [] Current medical condition precludes nutrition intervention at this time.    MONITORING AND EVALUATION:   RD remains available upon request and will follow up per protocol.    Lina Uribe RDN, CDN / TEAMS   Available on [place] TEAMS

## 2025-04-07 NOTE — PROGRESS NOTE ADULT - PROBLEM SELECTOR PLAN 3
- EF 30% with severe tricuspid regurg and PASP 55mmHG on 2/7/24.   - c/w bumex 2mg IV bid   - GDMT:  Metoprolol succinate 50mg day. Holding entresto and aldactone for DENISHA. Not on SGLT2.  - Hydralazine/Nitrate: hydral 50mg TID. Isordil 20mg TID   - patient declined RHC and cardiomems  - per GOC with wife; pt dnr/dni but they wish to keep ICD active

## 2025-04-07 NOTE — PROGRESS NOTE ADULT - SUBJECTIVE AND OBJECTIVE BOX
Follow-up Pulm Progress Note    No new respiratory events overnight.  Denies SOB/CP.   Bipap qHS     Medications:  MEDICATIONS  (STANDING):  albuterol/ipratropium for Nebulization 3 milliLiter(s) Nebulizer every 6 hours  allopurinol 100 milliGRAM(s) Oral daily  apixaban 2.5 milliGRAM(s) Oral two times a day  aspirin enteric coated 81 milliGRAM(s) Oral daily  atorvastatin 20 milliGRAM(s) Oral at bedtime  buMETAnide Injectable 2 milliGRAM(s) IV Push every 12 hours  calcium acetate 667 milliGRAM(s) Oral three times a day with meals  chlorhexidine 2% Cloths 1 Application(s) Topical daily  chlorhexidine 4% Liquid 1 Application(s) Topical <User Schedule>  dextrose 5%. 1000 milliLiter(s) (50 mL/Hr) IV Continuous <Continuous>  dextrose 5%. 1000 milliLiter(s) (100 mL/Hr) IV Continuous <Continuous>  dextrose 50% Injectable 25 Gram(s) IV Push once  dextrose 50% Injectable 12.5 Gram(s) IV Push once  dextrose 50% Injectable 25 Gram(s) IV Push once  epoetin lashawn (EPOGEN) Injectable 71466 Unit(s) IV Push <User Schedule>  fluticasone propionate/ salmeterol 250-50 MICROgram(s) Diskus 1 Dose(s) Inhalation two times a day  folic acid 1 milliGRAM(s) Oral daily  glucagon  Injectable 1 milliGRAM(s) IntraMuscular once  insulin glargine Injectable (LANTUS) 18 Unit(s) SubCutaneous at bedtime  insulin lispro (ADMELOG) corrective regimen sliding scale   SubCutaneous three times a day before meals  insulin lispro Injectable (ADMELOG) 3 Unit(s) SubCutaneous three times a day before meals  metoprolol succinate ER 25 milliGRAM(s) Oral daily  pantoprazole    Tablet 40 milliGRAM(s) Oral every 12 hours  polyethylene glycol 3350 17 Gram(s) Oral two times a day  psyllium Powder 1 Packet(s) Oral daily  sodium chloride 0.65% Nasal 1 Spray(s) Both Nostrils two times a day  tamsulosin 0.4 milliGRAM(s) Oral at bedtime  tiotropium 2.5 MICROgram(s) Inhaler 2 Puff(s) Inhalation daily    MEDICATIONS  (PRN):  acetaminophen     Tablet .. 650 milliGRAM(s) Oral every 6 hours PRN Temp greater or equal to 38C (100.4F), Mild Pain (1 - 3)  bisacodyl 5 milliGRAM(s) Oral every 12 hours PRN Constipation  dextrose Oral Gel 15 Gram(s) Oral once PRN Blood Glucose LESS THAN 70 milliGRAM(s)/deciliter  dextrose Oral Gel 15 Gram(s) Oral once PRN Blood Glucose LESS THAN 70 milliGRAM(s)/deciliter  melatonin 3 milliGRAM(s) Oral at bedtime PRN Insomnia  ondansetron Injectable 4 milliGRAM(s) IV Push every 6 hours PRN Nausea and/or Vomiting  ondansetron Injectable 4 milliGRAM(s) IV Push once PRN Nausea and/or Vomiting  sodium chloride 0.9% lock flush 10 milliLiter(s) IV Push every 1 hour PRN Pre/post blood products, medications, blood draw, and to maintain line patency          Vital Signs Last 24 Hrs  T(C): 36.7 (07 Apr 2025 04:24), Max: 36.7 (06 Apr 2025 20:16)  T(F): 98 (07 Apr 2025 04:24), Max: 98 (06 Apr 2025 20:16)  HR: 70 (07 Apr 2025 08:56) (70 - 74)  BP: 119/66 (07 Apr 2025 04:24) (105/60 - 119/66)  BP(mean): --  RR: 18 (07 Apr 2025 04:24) (18 - 18)  SpO2: 97% (07 Apr 2025 08:56) (96% - 97%)    Parameters below as of 07 Apr 2025 04:24  Patient On (Oxygen Delivery Method): nasal cannula  O2 Flow (L/min): 3      ABG - ( 06 Apr 2025 05:24 )  pH, Arterial: 7.35  pH, Blood: x     /  pCO2: 52    /  pO2: 113   / HCO3: 29    / Base Excess: 2.1   /  SaO2: 98.3                  04-06 @ 07:01  -  04-07 @ 07:00  --------------------------------------------------------  IN: 420 mL / OUT: 0 mL / NET: 420 mL          LABS:                        10.3   12.07 )-----------( 119      ( 06 Apr 2025 06:59 )             36.1     04-07    135  |  94[L]  |  55[H]  ----------------------------<  107[H]  4.7   |  21[L]  |  5.77[H]    Ca    8.4      07 Apr 2025 07:08  Phos  5.2     04-07  Mg     2.5     04-07    TPro  6.7  /  Alb  2.9[L]  /  TBili  0.7  /  DBili  x   /  AST  33  /  ALT  <5[L]  /  AlkPhos  232[H]  04-06          CAPILLARY BLOOD GLUCOSE      POCT Blood Glucose.: 145 mg/dL (07 Apr 2025 08:36)      Urinalysis Basic - ( 07 Apr 2025 07:08 )    Color: x / Appearance: x / SG: x / pH: x  Gluc: 107 mg/dL / Ketone: x  / Bili: x / Urobili: x   Blood: x / Protein: x / Nitrite: x   Leuk Esterase: x / RBC: x / WBC x   Sq Epi: x / Non Sq Epi: x / Bacteria: x          CULTURES: (if applicable)    Culture - Blood (collected 03-30-25 @ 17:36)  Source: Blood Blood-Peripheral  Final Report (04-04-25 @ 22:00):    No growth at 5 days      Physical Examination:  PULM: Decreased BS   CVS: S1, S2 heard    RADIOLOGY REVIEWED  CXR: congestion  L effusion/atelectasis     TRANSTHORACIC ECHOCARDIOGRAM REPORT  ________________________________________________________________________________                                      _______       Pt. Name:       JO VARELA Study Date:    2/7/2025  MRN:            [id number]    YOB: 1945  Accession #:    [id number]     Age:           79 years  Account#:       [id number]  Gender:        M  Heart Rate:     63 bpm        Height:        71.00 in (180.34 cm)  Rhythm:         sinus rhythm  Weight:        242.50 lb (110.00 kg)  Blood Pressure: 106/67 mmHg   BSA/BMI:       2.29 m² / 33.82 kg/m²  ________________________________________________________________________________________  Referring Physician:    [id number] Aliyah Correa  Interpreting Physician: Jo Lin MD  Primary Sonographer:    Micah Pedraza [facility]  Fellow (Performing):    Ana M Noyola MD    CPT:                ECHO TTE WITH CON COMP W DOPP - .m;DEFINITY ECHO                      CONTRAST PER ML - .m;DEFINITY ECHO CONTRAST PER ML                      WASTED - .m  Indication(s):      Dyspnea, unspecified - R06.00  Procedure:          Transthoracic echocardiogram with 2-D, M-mode and complete                      spectral and color flow Doppler.  Ordering Location:  [facility]  Admission Status:   Inpatient  Contrast Injection: Verbal consent was obtained for injection of Ultrasonic                      Enhancing Agent following a discussion of risks and                      benefits.                      Endocardial visualization enhanced with 2 ml of Definity                      Ultrasound enhancing agent (Lot#:[id number] Exp.Date:2025-AUG                      Discarded Dose:8ml).  UEA Reaction:       Patient had no adverse reaction after injection of                      Ultrasound Enhancing Agent.  Study Information:  Image quality for this study is technically difficult.    _______________________________________________________________________________________     CONCLUSIONS:      1. Definity ultrasound enhancing agent was given for enhanced left ventricular opacification and improved delineation of the left ventricular endocardial borders.   2. Left ventricular wall thickness is normal. Left ventricular systolic function is severely decreased with an ejection fraction visually estimated at 30 %. Global left ventricular hypokinesis.   3. Multiple segmental abnormalities exist. See findings.   4. At least "moderate" mitral regurgitation.   5. 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 Trace intravalvular regurgitation.   6. Severely enlarged right ventricular cavity size and reduced right ventricular systolic function.   7. Probably severe tricuspid regurgitation.   8. Device lead is visualized in the right heart.    ________________________________________________________________________________________  FINDINGS:     Left Ventricle:  After obtaining consent, Definity ultrasound enhancing agent was given for enhanced left ventricular opacification and improved delineation of the left ventricular endocardial borders. Left ventricular wall thickness is normal. Left ventricular systolic function is severely decreased with an ejection fraction visually estimated at 30%. There is global left ventricular hypokinesis. There is no evidence of a left ventricular thrombus.  LV Wall Scoring: The entire apex is akinetic. The basal and mid anterior wall,  basal and mid anterolateral wall, basal and mid anterior septum, basal and mid  inferior septum, basal and mid inferior wall, and basal and mid inferolateral  wall are hypokinetic.          Right Ventricle:  The right ventricular cavity is severely enlarged in size and right ventricular systolic function is reduced. Tricuspid annular plane systolic excursion (TAPSE) is 1.0 cm (normal >=1.7 cm). Tricuspid annular tissue Doppler S' is 4.2 cm/s (normal >10 cm/s). A device lead is visualized in the right heart.     Left Atrium:  The left atrium is moderately dilated with an indexed volume of 43.69 ml/m².     Right Atrium:  The right atrium is severely dilated with an indexed volume of 48.50 ml/m².     Interatrial Septum:  The interatrial septum appears intact.     Aortic Valve:  A a transcatheter deployed (TAVR) is present in the aortic position. The prosthetic valve has normal function. The peak transaortic velocity is 2.00 m/s, peak transaortic gradient is 16.0 mmHg and mean transaortic gradient is 9.0 mmHg with an LVOT/aortic valve VTI ratio of 0.41. The effective orifice area is estimated at 1.99 cm² by the continuity equation. There is trace intravalvular regurgitation.     Mitral Valve:  There is at least "moderate" mitral regurgitation.     Tricuspid Valve:  There is probably severe tricuspid regurgitation. Estimated pulmonary artery systolic pressure is 52 mmHg, consistent with mild to moderate pulmonary hypertension.     Pulmonic Valve:  There is mild pulmonic regurgitation.     Aorta:  The aortic root appears normal in size.     Pericardium:  No pericardial effusion seen.     Systemic Veins:  The inferior vena cava is dilated measuring 2.35 cm in diameter, (dilated >2.1cm) with abnormal inspiratory collapse (abnormal <50%) consistent with elevated right atrial pressure (~15, range 10-20mmHg).  ____________________________________________________________________  QUANTITATIVE DATA:  Left Ventricle Measurements: (Indexed to BSA)     IVSd (2D):   1.1 cm  LVPWd (2D):  1.1 cm  LVIDd (2D):  5.3 cm  LVIDs (2D):  4.5 cm  LV Mass:     228 g  99.5 g/m²  Visualized LV EF%: 30%     MV E Vmax:    1.05 m/s  e' lateral:   4.73 cm/s  e' medial:    4.16 cm/s  E/e' lateral: 22.20  E/e' medial:  25.24  E/e' Average: 23.62    Aorta Measurements: (Normal range) (Indexed to BSA)     Ao Root d     3.60 cm (3.1 - 3.7 cm) 1.57 cm/m²  Ao Asc d, 2D: 3.60  Ao Asc prox:  3.60 cm                1.57 cm/m²  Ao Arch:      2.8 cm            Left Atrium Measurements: (Indexed to BSA)  LA Diam 2D:        5.70 cm  LA Vol s, MOD A4C: 85.80 ml.  LA Vol s, MOD A2C: 116.00 ml.  LA Vol s, MOD BP:  100.00 ml  43.69 ml/m²       Right Ventricle Measurements: Right Atrial Measurements:     TAPSE:           1.0 cm       RA Vol:            111.00 ml  RV Base (RVID1): 5.6 cm       RA Vol s, MOD A4C  111.0 ml  RV Mid (RVID2):  4.7 cm       RA Vol Index:      48.50 ml/m²                                RA Area s, MOD A4C 29.2 cm²       LVOT / RVOT/ Qp/Qs Data: (Indexed to BSA)  LVOT Diameter,s: 2.50 cm  LVOT Area:       4.91 cm²  LVOT Vmax:       0.92 m/s  LVOT Vmn:        0.622 m/s  LVOT VTI:        16.00 cm  LVOT peak grad:  3 mmHg  LVOT mean grad:  1.5 mmHg  LVOT SV:         78.5 ml   34.31 ml/m²    Aortic Valve Measurements:  AV Vmax:                2.0 m/s  AV Peak Gradient:       16.0 mmHg  AV Mean Gradient:       9.0 mmHg  AV VTI:                 39.4 cm  AV VTI Ratio:           0.41  AoV EOA, Contin:        1.99 cm²  AoV EOA, Contin i:      0.87 cm²/m²  AoV Dimensionless Index 0.41    Mitral Valve Measurements:     MV E Vmax: 1.0 m/s       Tricuspid Valve Measurements:     TV S'             4.2 cm/s  TR Vmax:          3.0 m/s  TR Peak Gradient: 36.7 mmHg  RA Pressure:      15 mmHg  PASP:             52 mmHg

## 2025-04-07 NOTE — PROGRESS NOTE ADULT - ASSESSMENT
78 y/o M with PMH of CAD s/p CABG, HF (combined HFrEF [EF 25%] and HFpEF [G3DD]) w/ ICD, AFib on Xarelto, severe AS s/p TAVR, COPD on 3-4L home O2, CKD3, HTN, HLD, T2DM, and BPH who presents as a transfer from [facility] for HF evaluation. He presented to [facility] on 1/10/25 for worsening of his chronic SOB for 2 weeks, with associated increased LE edema and abdominal distention - admitted for acute on chronic hypoxic respiratory failure, treated with IV diuresis & Bipap. Tx to [facility] for further evaluation by HF team. Prolonged hospital course for CHF exacerbation, DENISHA on CKD now requiring HD, RRT/Code stroke 3/30 for lethargy and facial droop, CT head negative. Pulmonary called to consult for acute on chronic hypoxic/hypercapnic respiratory failure requiring Bipap.

## 2025-04-07 NOTE — PROGRESS NOTE ADULT - SUBJECTIVE AND OBJECTIVE BOX
On NC O2, MS at baseline, for HD today     Vital Signs Last 24 Hrs  T(C): 36.7 (04-07-25 @ 04:24), Max: 36.7 (04-06-25 @ 20:16)  T(F): 98 (04-07-25 @ 04:24), Max: 98 (04-06-25 @ 20:16)  HR: 70 (04-07-25 @ 08:56) (69 - 74)  BP: 119/66 (04-07-25 @ 04:24) (96/53 - 119/66)  BP(mean): 68 (04-06-25 @ 11:08) (68 - 68)  RR: 18 (04-07-25 @ 04:24) (18 - 18)  SpO2: 97% (04-07-25 @ 08:56) (96% - 98%)    I&O's Detail    06 Apr 2025 07:01  -  07 Apr 2025 07:00  --------------------------------------------------------  IN:    Oral Fluid: 420 mL  Total IN: 420 mL    OUT:    Voided (mL): 0 mL  Total OUT: 0 mL    s1s2  b/l air entry  soft, ascites   edema                                                                                                                                                                                                     10.3   12.07 )-----------( 119      ( 06 Apr 2025 06:59 )             36.1     07 Apr 2025 07:08    135    |  94     |  55     ----------------------------<  107    4.7     |  21     |  5.77     Ca    8.4        07 Apr 2025 07:08  Phos  5.2       07 Apr 2025 07:08  Mg     2.5       07 Apr 2025 07:08    TPro  6.7    /  Alb  2.9    /  TBili  0.7    /  DBili  x      /  AST  33     /  ALT  <5     /  AlkPhos  232    06 Apr 2025 06:54    LIVER FUNCTIONS - ( 06 Apr 2025 06:54 )  Alb: 2.9 g/dL / Pro: 6.7 g/dL / ALK PHOS: 232 U/L / ALT: <5 U/L / AST: 33 U/L / GGT: x           acetaminophen     Tablet .. 650 milliGRAM(s) Oral every 6 hours PRN  albuterol/ipratropium for Nebulization 3 milliLiter(s) Nebulizer every 6 hours  allopurinol 100 milliGRAM(s) Oral daily  apixaban 2.5 milliGRAM(s) Oral two times a day  aspirin enteric coated 81 milliGRAM(s) Oral daily  atorvastatin 20 milliGRAM(s) Oral at bedtime  bisacodyl 5 milliGRAM(s) Oral every 12 hours PRN  buMETAnide Injectable 2 milliGRAM(s) IV Push every 12 hours  calcium acetate 667 milliGRAM(s) Oral three times a day with meals  chlorhexidine 2% Cloths 1 Application(s) Topical daily  chlorhexidine 4% Liquid 1 Application(s) Topical <User Schedule>  dextrose 5%. 1000 milliLiter(s) IV Continuous <Continuous>  dextrose 5%. 1000 milliLiter(s) IV Continuous <Continuous>  dextrose 50% Injectable 25 Gram(s) IV Push once  dextrose 50% Injectable 12.5 Gram(s) IV Push once  dextrose 50% Injectable 25 Gram(s) IV Push once  dextrose Oral Gel 15 Gram(s) Oral once PRN  dextrose Oral Gel 15 Gram(s) Oral once PRN  epoetin lashawn (EPOGEN) Injectable 12662 Unit(s) IV Push <User Schedule>  fluticasone propionate/ salmeterol 250-50 MICROgram(s) Diskus 1 Dose(s) Inhalation two times a day  folic acid 1 milliGRAM(s) Oral daily  glucagon  Injectable 1 milliGRAM(s) IntraMuscular once  insulin glargine Injectable (LANTUS) 18 Unit(s) SubCutaneous at bedtime  insulin lispro (ADMELOG) corrective regimen sliding scale   SubCutaneous three times a day before meals  insulin lispro Injectable (ADMELOG) 3 Unit(s) SubCutaneous three times a day before meals  melatonin 3 milliGRAM(s) Oral at bedtime PRN  metoprolol succinate ER 25 milliGRAM(s) Oral daily  ondansetron Injectable 4 milliGRAM(s) IV Push once PRN  ondansetron Injectable 4 milliGRAM(s) IV Push every 6 hours PRN  pantoprazole    Tablet 40 milliGRAM(s) Oral every 12 hours  polyethylene glycol 3350 17 Gram(s) Oral two times a day  psyllium Powder 1 Packet(s) Oral daily  sodium chloride 0.65% Nasal 1 Spray(s) Both Nostrils two times a day  sodium chloride 0.9% lock flush 10 milliLiter(s) IV Push every 1 hour PRN  tamsulosin 0.4 milliGRAM(s) Oral at bedtime  tiotropium 2.5 MICROgram(s) Inhaler 2 Puff(s) Inhalation daily    A/P:    COPD, on home O2  CM, EF ~ 30%, severe TR  Adm to [facility] VS 1/10/25 w/fluid overload   Tx to [facility] 2/5/25 for HF eval    S/p LVP 3/14 w/4.5L fluid removal, s/p SPA   Dropped Hgb to 5.3 on 3/15, s/p PRBCs   Hemodynamic/cardio-renal DENISHA/CKD   Started on HD 3/24 via temp HD cath  S/p perm cath 3/28  HD MWF  TMP 2-3kg as tolerates w/HD   Arrangements for [facility] w/HD in progress  D/w family     [phone]

## 2025-04-07 NOTE — PROGRESS NOTE ADULT - SUBJECTIVE AND OBJECTIVE BOX
Patient is a 79y old  Male who presents with a chief complaint of HF eval (07 Apr 2025 11:14)      SUBJECTIVE / OVERNIGHT EVENTS: pt seen with wife at bedside, reports he feels a bit better, wants to work with PT, denies cp, sob    MEDICATIONS  (STANDING):  albuterol/ipratropium for Nebulization 3 milliLiter(s) Nebulizer every 6 hours  allopurinol 100 milliGRAM(s) Oral daily  apixaban 2.5 milliGRAM(s) Oral two times a day  aspirin enteric coated 81 milliGRAM(s) Oral daily  atorvastatin 20 milliGRAM(s) Oral at bedtime  buMETAnide Injectable 2 milliGRAM(s) IV Push every 12 hours  calcium acetate 667 milliGRAM(s) Oral three times a day with meals  chlorhexidine 2% Cloths 1 Application(s) Topical daily  chlorhexidine 4% Liquid 1 Application(s) Topical <User Schedule>  dextrose 5%. 1000 milliLiter(s) (50 mL/Hr) IV Continuous <Continuous>  dextrose 5%. 1000 milliLiter(s) (100 mL/Hr) IV Continuous <Continuous>  dextrose 50% Injectable 25 Gram(s) IV Push once  dextrose 50% Injectable 12.5 Gram(s) IV Push once  dextrose 50% Injectable 25 Gram(s) IV Push once  epoetin lashawn (EPOGEN) Injectable 90369 Unit(s) IV Push <User Schedule>  fluticasone propionate/ salmeterol 250-50 MICROgram(s) Diskus 1 Dose(s) Inhalation two times a day  folic acid 1 milliGRAM(s) Oral daily  glucagon  Injectable 1 milliGRAM(s) IntraMuscular once  insulin glargine Injectable (LANTUS) 18 Unit(s) SubCutaneous at bedtime  insulin lispro (ADMELOG) corrective regimen sliding scale   SubCutaneous three times a day before meals  insulin lispro Injectable (ADMELOG) 3 Unit(s) SubCutaneous three times a day before meals  metoprolol succinate ER 25 milliGRAM(s) Oral daily  pantoprazole    Tablet 40 milliGRAM(s) Oral every 12 hours  polyethylene glycol 3350 17 Gram(s) Oral two times a day  psyllium Powder 1 Packet(s) Oral daily  sodium chloride 0.65% Nasal 1 Spray(s) Both Nostrils two times a day  tamsulosin 0.4 milliGRAM(s) Oral at bedtime  tiotropium 2.5 MICROgram(s) Inhaler 2 Puff(s) Inhalation daily    MEDICATIONS  (PRN):  acetaminophen     Tablet .. 650 milliGRAM(s) Oral every 6 hours PRN Temp greater or equal to 38C (100.4F), Mild Pain (1 - 3)  bisacodyl 5 milliGRAM(s) Oral every 12 hours PRN Constipation  dextrose Oral Gel 15 Gram(s) Oral once PRN Blood Glucose LESS THAN 70 milliGRAM(s)/deciliter  dextrose Oral Gel 15 Gram(s) Oral once PRN Blood Glucose LESS THAN 70 milliGRAM(s)/deciliter  melatonin 3 milliGRAM(s) Oral at bedtime PRN Insomnia  ondansetron Injectable 4 milliGRAM(s) IV Push every 6 hours PRN Nausea and/or Vomiting  ondansetron Injectable 4 milliGRAM(s) IV Push once PRN Nausea and/or Vomiting  sodium chloride 0.9% lock flush 10 milliLiter(s) IV Push every 1 hour PRN Pre/post blood products, medications, blood draw, and to maintain line patency        CAPILLARY BLOOD GLUCOSE      POCT Blood Glucose.: 145 mg/dL (07 Apr 2025 08:36)  POCT Blood Glucose.: 147 mg/dL (06 Apr 2025 21:27)  POCT Blood Glucose.: 188 mg/dL (06 Apr 2025 17:21)  POCT Blood Glucose.: 194 mg/dL (06 Apr 2025 12:49)    I&O's Summary    06 Apr 2025 07:01  -  07 Apr 2025 07:00  --------------------------------------------------------  IN: 420 mL / OUT: 0 mL / NET: 420 mL        PHYSICAL EXAM:  GENERAL: NAD, well-developed  HEAD:  Atraumatic, Normocephalic  EYES:  conjunctiva and sclera clear  CHEST/LUNG: Clear to auscultation bilaterally; No wheeze  HEART: Regular rate and rhythm; S1S2  ABDOMEN: Soft, Nontender, Nondistended; Bowel sounds present  EXTREMITIES:  anasarca   PSYCH: AAOx3      LABS:                        10.3   12.07 )-----------( 119      ( 06 Apr 2025 06:59 )             36.1     04-07    135  |  94[L]  |  55[H]  ----------------------------<  107[H]  4.7   |  21[L]  |  5.77[H]    Ca    8.4      07 Apr 2025 07:08  Phos  5.2     04-07  Mg     2.5     04-07    TPro  6.7  /  Alb  2.9[L]  /  TBili  0.7  /  DBili  x   /  AST  33  /  ALT  <5[L]  /  AlkPhos  232[H]  04-06          Urinalysis Basic - ( 07 Apr 2025 07:08 )    Color: x / Appearance: x / SG: x / pH: x  Gluc: 107 mg/dL / Ketone: x  / Bili: x / Urobili: x   Blood: x / Protein: x / Nitrite: x   Leuk Esterase: x / RBC: x / WBC x   Sq Epi: x / Non Sq Epi: x / Bacteria: x        RADIOLOGY & ADDITIONAL TESTS:    Imaging Personally Reviewed:    Consultant(s) Notes Reviewed:      Care Discussed with Consultants/Other Providers:

## 2025-04-08 LAB
ALBUMIN SERPL ELPH-MCNC: 3 G/DL — LOW (ref 3.3–5)
ALP SERPL-CCNC: 261 U/L — HIGH (ref 40–120)
ALT FLD-CCNC: 6 U/L — LOW (ref 10–45)
ANION GAP SERPL CALC-SCNC: 16 MMOL/L — SIGNIFICANT CHANGE UP (ref 5–17)
AST SERPL-CCNC: 27 U/L — SIGNIFICANT CHANGE UP (ref 10–40)
BILIRUB SERPL-MCNC: 0.5 MG/DL — SIGNIFICANT CHANGE UP (ref 0.2–1.2)
BUN SERPL-MCNC: 39 MG/DL — HIGH (ref 7–23)
CALCIUM SERPL-MCNC: 8.5 MG/DL — SIGNIFICANT CHANGE UP (ref 8.4–10.5)
CHLORIDE SERPL-SCNC: 98 MMOL/L — SIGNIFICANT CHANGE UP (ref 96–108)
CO2 SERPL-SCNC: 24 MMOL/L — SIGNIFICANT CHANGE UP (ref 22–31)
CREAT SERPL-MCNC: 4.84 MG/DL — HIGH (ref 0.5–1.3)
EGFR: 11 ML/MIN/1.73M2 — LOW
EGFR: 11 ML/MIN/1.73M2 — LOW
GLUCOSE BLDC GLUCOMTR-MCNC: 129 MG/DL — HIGH (ref 70–99)
GLUCOSE BLDC GLUCOMTR-MCNC: 141 MG/DL — HIGH (ref 70–99)
GLUCOSE BLDC GLUCOMTR-MCNC: 158 MG/DL — HIGH (ref 70–99)
GLUCOSE BLDC GLUCOMTR-MCNC: 184 MG/DL — HIGH (ref 70–99)
GLUCOSE SERPL-MCNC: 161 MG/DL — HIGH (ref 70–99)
HCT VFR BLD CALC: 36.4 % — LOW (ref 39–50)
HGB BLD-MCNC: 10.1 G/DL — LOW (ref 13–17)
MCHC RBC-ENTMCNC: 25.3 PG — LOW (ref 27–34)
MCHC RBC-ENTMCNC: 27.7 G/DL — LOW (ref 32–36)
MCV RBC AUTO: 91.2 FL — SIGNIFICANT CHANGE UP (ref 80–100)
NRBC BLD AUTO-RTO: 0 /100 WBCS — SIGNIFICANT CHANGE UP (ref 0–0)
PLATELET # BLD AUTO: 114 K/UL — LOW (ref 150–400)
POTASSIUM SERPL-MCNC: 4.4 MMOL/L — SIGNIFICANT CHANGE UP (ref 3.5–5.3)
POTASSIUM SERPL-SCNC: 4.4 MMOL/L — SIGNIFICANT CHANGE UP (ref 3.5–5.3)
PROT SERPL-MCNC: 7 G/DL — SIGNIFICANT CHANGE UP (ref 6–8.3)
RBC # BLD: 3.99 M/UL — LOW (ref 4.2–5.8)
RBC # FLD: 19 % — HIGH (ref 10.3–14.5)
SODIUM SERPL-SCNC: 138 MMOL/L — SIGNIFICANT CHANGE UP (ref 135–145)
WBC # BLD: 12.46 K/UL — HIGH (ref 3.8–10.5)
WBC # FLD AUTO: 12.46 K/UL — HIGH (ref 3.8–10.5)

## 2025-04-08 PROCEDURE — 93971 EXTREMITY STUDY: CPT | Mod: 26,LT

## 2025-04-08 PROCEDURE — 99233 SBSQ HOSP IP/OBS HIGH 50: CPT

## 2025-04-08 RX ADMIN — Medication 1 APPLICATION(S): at 11:43

## 2025-04-08 RX ADMIN — IPRATROPIUM BROMIDE AND ALBUTEROL SULFATE 3 MILLILITER(S): .5; 2.5 SOLUTION RESPIRATORY (INHALATION) at 17:27

## 2025-04-08 RX ADMIN — BUMETANIDE 2 MILLIGRAM(S): 1 TABLET ORAL at 17:29

## 2025-04-08 RX ADMIN — POLYETHYLENE GLYCOL 3350 17 GRAM(S): 17 POWDER, FOR SOLUTION ORAL at 06:46

## 2025-04-08 RX ADMIN — ATORVASTATIN CALCIUM 20 MILLIGRAM(S): 80 TABLET, FILM COATED ORAL at 21:29

## 2025-04-08 RX ADMIN — METOPROLOL SUCCINATE 25 MILLIGRAM(S): 50 TABLET, EXTENDED RELEASE ORAL at 06:45

## 2025-04-08 RX ADMIN — INSULIN LISPRO 3 UNIT(S): 100 INJECTION, SOLUTION INTRAVENOUS; SUBCUTANEOUS at 17:28

## 2025-04-08 RX ADMIN — APIXABAN 2.5 MILLIGRAM(S): 2.5 TABLET, FILM COATED ORAL at 06:45

## 2025-04-08 RX ADMIN — INSULIN LISPRO 3 UNIT(S): 100 INJECTION, SOLUTION INTRAVENOUS; SUBCUTANEOUS at 08:52

## 2025-04-08 RX ADMIN — TIOTROPIUM BROMIDE INHALATION SPRAY 2 PUFF(S): 3.12 SPRAY, METERED RESPIRATORY (INHALATION) at 17:46

## 2025-04-08 RX ADMIN — IPRATROPIUM BROMIDE AND ALBUTEROL SULFATE 3 MILLILITER(S): .5; 2.5 SOLUTION RESPIRATORY (INHALATION) at 06:45

## 2025-04-08 RX ADMIN — Medication 1 SPRAY(S): at 06:46

## 2025-04-08 RX ADMIN — TAMSULOSIN HYDROCHLORIDE 0.4 MILLIGRAM(S): 0.4 CAPSULE ORAL at 21:29

## 2025-04-08 RX ADMIN — Medication 667 MILLIGRAM(S): at 11:42

## 2025-04-08 RX ADMIN — Medication 40 MILLIGRAM(S): at 17:28

## 2025-04-08 RX ADMIN — Medication 1 DOSE(S): at 17:27

## 2025-04-08 RX ADMIN — BUMETANIDE 2 MILLIGRAM(S): 1 TABLET ORAL at 10:16

## 2025-04-08 RX ADMIN — INSULIN GLARGINE-YFGN 18 UNIT(S): 100 INJECTION, SOLUTION SUBCUTANEOUS at 21:30

## 2025-04-08 RX ADMIN — Medication 81 MILLIGRAM(S): at 11:42

## 2025-04-08 RX ADMIN — Medication 1 PACKET(S): at 21:29

## 2025-04-08 RX ADMIN — FOLIC ACID 1 MILLIGRAM(S): 1 TABLET ORAL at 11:43

## 2025-04-08 RX ADMIN — INSULIN LISPRO 2: 100 INJECTION, SOLUTION INTRAVENOUS; SUBCUTANEOUS at 12:46

## 2025-04-08 RX ADMIN — Medication 667 MILLIGRAM(S): at 08:53

## 2025-04-08 RX ADMIN — IPRATROPIUM BROMIDE AND ALBUTEROL SULFATE 3 MILLILITER(S): .5; 2.5 SOLUTION RESPIRATORY (INHALATION) at 11:42

## 2025-04-08 RX ADMIN — INSULIN LISPRO 3 UNIT(S): 100 INJECTION, SOLUTION INTRAVENOUS; SUBCUTANEOUS at 12:47

## 2025-04-08 RX ADMIN — Medication 100 MILLIGRAM(S): at 11:42

## 2025-04-08 RX ADMIN — INSULIN LISPRO 2: 100 INJECTION, SOLUTION INTRAVENOUS; SUBCUTANEOUS at 08:52

## 2025-04-08 RX ADMIN — Medication 1 DOSE(S): at 06:45

## 2025-04-08 RX ADMIN — Medication 667 MILLIGRAM(S): at 17:27

## 2025-04-08 RX ADMIN — Medication 1 SPRAY(S): at 17:27

## 2025-04-08 RX ADMIN — APIXABAN 2.5 MILLIGRAM(S): 2.5 TABLET, FILM COATED ORAL at 17:27

## 2025-04-08 RX ADMIN — Medication 40 MILLIGRAM(S): at 06:45

## 2025-04-08 RX ADMIN — Medication 1 APPLICATION(S): at 06:46

## 2025-04-08 NOTE — PROGRESS NOTE ADULT - PROBLEM SELECTOR PLAN 1
2nd to underlying COPD. Exacerbated in the setting of volume overload   -CXR with congestion, L effusion/atelectasis  -Keep O>I with HD, diuresis as tolerated  -Continue bronchodilators  -ABGs reviewed. Pt w/ hx EMBER on CPAP at home, failed CPAP this admission and now escalated to Bipap   -Bipap not worn overnight 4/2-4/3, CO2 84 in AM on 4/3. CO2 lowered by 7 points to 77 after several hours on bipap and then lowered further to 64 later in the day after settings adjusted.   -ABG 4/5 acceptable, reflective of Bipap 18/7/30%. Continue Bipap qHS and PRN daytime   -Keep sats >90% with O2  -Pt will need Bipap on discharge.

## 2025-04-08 NOTE — PROGRESS NOTE ADULT - ASSESSMENT
78 y/o male w/ PMHx CAD s/p CABG, HF (combined HFrEF [EF 25%] and HFpEF [G3DD]) w/ ICD, AFib on Xarelto, severe AS s/p TAVR, COPD on 3-4L home O2, CKD4, HTN, HLD, T2DM, and BPH who presents as a transfer from [facility] for HF evaluation. Ongoing CHF exacerbation now off bumex gtt, new DENISHA on CKD likely 2/2 ongoing cardiorenal and possible component of abdominal compartment syndrome given tremendous ascites, now s/p tunnelled HD catheter.  RRT/Code Stroke on 3/30/25 for lethargy and facial droop, CT negative, likely in the setting of hypercarbia, placed on bipap with improvement.

## 2025-04-08 NOTE — PROGRESS NOTE ADULT - SUBJECTIVE AND OBJECTIVE BOX
Follow-up Pulm Progress Note    No new respiratory events overnight.  Denies SOB/CP.     Medications:  MEDICATIONS  (STANDING):  albuterol/ipratropium for Nebulization 3 milliLiter(s) Nebulizer every 6 hours  allopurinol 100 milliGRAM(s) Oral daily  apixaban 2.5 milliGRAM(s) Oral two times a day  aspirin enteric coated 81 milliGRAM(s) Oral daily  atorvastatin 20 milliGRAM(s) Oral at bedtime  buMETAnide Injectable 2 milliGRAM(s) IV Push every 12 hours  calcium acetate 667 milliGRAM(s) Oral three times a day with meals  chlorhexidine 2% Cloths 1 Application(s) Topical daily  chlorhexidine 4% Liquid 1 Application(s) Topical <User Schedule>  dextrose 5%. 1000 milliLiter(s) (100 mL/Hr) IV Continuous <Continuous>  dextrose 5%. 1000 milliLiter(s) (50 mL/Hr) IV Continuous <Continuous>  dextrose 50% Injectable 25 Gram(s) IV Push once  dextrose 50% Injectable 12.5 Gram(s) IV Push once  dextrose 50% Injectable 25 Gram(s) IV Push once  epoetin lashawn (EPOGEN) Injectable 90942 Unit(s) IV Push <User Schedule>  fluticasone propionate/ salmeterol 250-50 MICROgram(s) Diskus 1 Dose(s) Inhalation two times a day  folic acid 1 milliGRAM(s) Oral daily  glucagon  Injectable 1 milliGRAM(s) IntraMuscular once  insulin glargine Injectable (LANTUS) 18 Unit(s) SubCutaneous at bedtime  insulin lispro (ADMELOG) corrective regimen sliding scale   SubCutaneous three times a day before meals  insulin lispro Injectable (ADMELOG) 3 Unit(s) SubCutaneous three times a day before meals  metoprolol succinate ER 25 milliGRAM(s) Oral daily  pantoprazole    Tablet 40 milliGRAM(s) Oral every 12 hours  polyethylene glycol 3350 17 Gram(s) Oral two times a day  psyllium Powder 1 Packet(s) Oral daily  sodium chloride 0.65% Nasal 1 Spray(s) Both Nostrils two times a day  tamsulosin 0.4 milliGRAM(s) Oral at bedtime  tiotropium 2.5 MICROgram(s) Inhaler 2 Puff(s) Inhalation daily    MEDICATIONS  (PRN):  acetaminophen     Tablet .. 650 milliGRAM(s) Oral every 6 hours PRN Temp greater or equal to 38C (100.4F), Mild Pain (1 - 3)  bisacodyl 5 milliGRAM(s) Oral every 12 hours PRN Constipation  dextrose Oral Gel 15 Gram(s) Oral once PRN Blood Glucose LESS THAN 70 milliGRAM(s)/deciliter  dextrose Oral Gel 15 Gram(s) Oral once PRN Blood Glucose LESS THAN 70 milliGRAM(s)/deciliter  melatonin 3 milliGRAM(s) Oral at bedtime PRN Insomnia  ondansetron Injectable 4 milliGRAM(s) IV Push once PRN Nausea and/or Vomiting  ondansetron Injectable 4 milliGRAM(s) IV Push every 6 hours PRN Nausea and/or Vomiting  sodium chloride 0.9% lock flush 10 milliLiter(s) IV Push every 1 hour PRN Pre/post blood products, medications, blood draw, and to maintain line patency          Vital Signs Last 24 Hrs  T(C): 36.2 (08 Apr 2025 10:16), Max: 37.1 (07 Apr 2025 20:51)  T(F): 97.1 (08 Apr 2025 10:16), Max: 98.8 (07 Apr 2025 20:51)  HR: 73 (08 Apr 2025 10:16) (68 - 78)  BP: 107/69 (08 Apr 2025 10:16) (101/58 - 123/69)  BP(mean): --  RR: 18 (08 Apr 2025 10:16) (17 - 18)  SpO2: 98% (08 Apr 2025 10:16) (94% - 100%)    Parameters below as of 08 Apr 2025 10:16  Patient On (Oxygen Delivery Method): nasal cannula  O2 Flow (L/min): 3            04-07 @ 07:01  -  04-08 @ 07:00  --------------------------------------------------------  IN: 1100 mL / OUT: 3200 mL / NET: -2100 mL          LABS:                        10.1   12.46 )-----------( 114      ( 08 Apr 2025 07:21 )             36.4     04-08    138  |  98  |  39[H]  ----------------------------<  161[H]  4.4   |  24  |  4.84[H]    Ca    8.5      08 Apr 2025 07:21  Phos  5.2     04-07  Mg     2.5     04-07    TPro  7.0  /  Alb  3.0[L]  /  TBili  0.5  /  DBili  x   /  AST  27  /  ALT  6[L]  /  AlkPhos  261[H]  04-08          CAPILLARY BLOOD GLUCOSE      POCT Blood Glucose.: 184 mg/dL (08 Apr 2025 08:44)      Urinalysis Basic - ( 08 Apr 2025 07:21 )    Color: x / Appearance: x / SG: x / pH: x  Gluc: 161 mg/dL / Ketone: x  / Bili: x / Urobili: x   Blood: x / Protein: x / Nitrite: x   Leuk Esterase: x / RBC: x / WBC x   Sq Epi: x / Non Sq Epi: x / Bacteria: x                  CULTURES:     Culture - Blood (collected 03-30-25 @ 17:36)  Source: Blood Blood-Peripheral  Final Report (04-04-25 @ 22:00):    No growth at 5 days                  Physical Examination:  PULM: Decreased BS   CVS: S1, S2 heard    RADIOLOGY REVIEWED  CXR: congestion  L effusion/atelectasis     TRANSTHORACIC ECHOCARDIOGRAM REPORT  ________________________________________________________________________________                                      _______       Pt. Name:       JO VARELA Study Date:    2/7/2025  MRN:            [id number]    YOB: 1945  Accession #:    [id number]     Age:           79 years  Account#:       [id number]  Gender:        M  Heart Rate:     63 bpm        Height:        71.00 in (180.34 cm)  Rhythm:         sinus rhythm  Weight:        242.50 lb (110.00 kg)  Blood Pressure: 106/67 mmHg   BSA/BMI:       2.29 m² / 33.82 kg/m²  ________________________________________________________________________________________  Referring Physician:    [id number] Aliyah Correa  Interpreting Physician: Jo Lin MD  Primary Sonographer:    Micah Pedraza RDCS  Fellow (Performing):    Ana M Noyola MD    CPT:                ECHO TTE WITH CON COMP W DOPP - .m;DEFINITY ECHO                      CONTRAST PER ML - .m;DEFINITY ECHO CONTRAST PER ML                      WASTED - .m  Indication(s):      Dyspnea, unspecified - R06.00  Procedure:          Transthoracic echocardiogram with 2-D, M-mode and complete                      spectral and color flow Doppler.  Ordering Location:  [facility]  Admission Status:   Inpatient  Contrast Injection: Verbal consent was obtained for injection of Ultrasonic                      Enhancing Agent following a discussion of risks and                      benefits.                      Endocardial visualization enhanced with 2 ml of Definity                      Ultrasound enhancing agent (Lot#:[id number] Exp.Date:2025-AUG                      Discarded Dose:8ml).  UEA Reaction:       Patient had no adverse reaction after injection of                      Ultrasound Enhancing Agent.  Study Information:  Image quality for this study is technically difficult.    _______________________________________________________________________________________     CONCLUSIONS:      1. Definity ultrasound enhancing agent was given for enhanced left ventricular opacification and improved delineation of the left ventricular endocardial borders.   2. Left ventricular wall thickness is normal. Left ventricular systolic function is severely decreased with an ejection fraction visually estimated at 30 %. Global left ventricular hypokinesis.   3. Multiple segmental abnormalities exist. See findings.   4. At least "moderate" mitral regurgitation.   5. 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 Trace intravalvular regurgitation.   6. Severely enlarged right ventricular cavity size and reduced right ventricular systolic function.   7. Probably severe tricuspid regurgitation.   8. Device lead is visualized in the right heart.    ________________________________________________________________________________________  FINDINGS:     Left Ventricle:  After obtaining consent, Definity ultrasound enhancing agent was given for enhanced left ventricular opacification and improved delineation of the left ventricular endocardial borders. Left ventricular wall thickness is normal. Left ventricular systolic function is severely decreased with an ejection fraction visually estimated at 30%. There is global left ventricular hypokinesis. There is no evidence of a left ventricular thrombus.  LV Wall Scoring: The entire apex is akinetic. The basal and mid anterior wall,  basal and mid anterolateral wall, basal and mid anterior septum, basal and mid  inferior septum, basal and mid inferior wall, and basal and mid inferolateral  wall are hypokinetic.          Right Ventricle:  The right ventricular cavity is severely enlarged in size and right ventricular systolic function is reduced. Tricuspid annular plane systolic excursion (TAPSE) is 1.0 cm (normal >=1.7 cm). Tricuspid annular tissue Doppler S' is 4.2 cm/s (normal >10 cm/s). A device lead is visualized in the right heart.     Left Atrium:  The left atrium is moderately dilated with an indexed volume of 43.69 ml/m².     Right Atrium:  The right atrium is severely dilated with an indexed volume of 48.50 ml/m².     Interatrial Septum:  The interatrial septum appears intact.     Aortic Valve:  A a transcatheter deployed (TAVR) is present in the aortic position. The prosthetic valve has normal function. The peak transaortic velocity is 2.00 m/s, peak transaortic gradient is 16.0 mmHg and mean transaortic gradient is 9.0 mmHg with an LVOT/aortic valve VTI ratio of 0.41. The effective orifice area is estimated at 1.99 cm² by the continuity equation. There is trace intravalvular regurgitation.     Mitral Valve:  There is at least "moderate" mitral regurgitation.     Tricuspid Valve:  There is probably severe tricuspid regurgitation. Estimated pulmonary artery systolic pressure is 52 mmHg, consistent with mild to moderate pulmonary hypertension.     Pulmonic Valve:  There is mild pulmonic regurgitation.     Aorta:  The aortic root appears normal in size.     Pericardium:  No pericardial effusion seen.     Systemic Veins:  The inferior vena cava is dilated measuring 2.35 cm in diameter, (dilated >2.1cm) with abnormal inspiratory collapse (abnormal <50%) consistent with elevated right atrial pressure (~15, range 10-20mmHg).  ____________________________________________________________________  QUANTITATIVE DATA:  Left Ventricle Measurements: (Indexed to BSA)     IVSd (2D):   1.1 cm  LVPWd (2D):  1.1 cm  LVIDd (2D):  5.3 cm  LVIDs (2D):  4.5 cm  LV Mass:     228 g  99.5 g/m²  Visualized LV EF%: 30%     MV E Vmax:    1.05 m/s  e' lateral:   4.73 cm/s  e' medial:    4.16 cm/s  E/e' lateral: 22.20  E/e' medial:  25.24  E/e' Average: 23.62    Aorta Measurements: (Normal range) (Indexed to BSA)     Ao Root d     3.60 cm (3.1 - 3.7 cm) 1.57 cm/m²  Ao Asc d, 2D: 3.60  Ao Asc prox:  3.60 cm                1.57 cm/m²  Ao Arch:      2.8 cm            Left Atrium Measurements: (Indexed to BSA)  LA Diam 2D:        5.70 cm  LA Vol s, MOD A4C: 85.80 ml.  LA Vol s, MOD A2C: 116.00 ml.  LA Vol s, MOD BP:  100.00 ml  43.69 ml/m²       Right Ventricle Measurements: Right Atrial Measurements:     TAPSE:           1.0 cm       RA Vol:            111.00 ml  RV Base (RVID1): 5.6 cm       RA Vol s, MOD A4C  111.0 ml  RV Mid (RVID2):  4.7 cm       RA Vol Index:      48.50 ml/m²                                RA Area s, MOD A4C 29.2 cm²       LVOT / RVOT/ Qp/Qs Data: (Indexed to BSA)  LVOT Diameter,s: 2.50 cm  LVOT Area:       4.91 cm²  LVOT Vmax:       0.92 m/s  LVOT Vmn:        0.622 m/s  LVOT VTI:        16.00 cm  LVOT peak grad:  3 mmHg  LVOT mean grad:  1.5 mmHg  LVOT SV:         78.5 ml   34.31 ml/m²    Aortic Valve Measurements:  AV Vmax:                2.0 m/s  AV Peak Gradient:       16.0 mmHg  AV Mean Gradient:       9.0 mmHg  AV VTI:                 39.4 cm  AV VTI Ratio:           0.41  AoV EOA, Contin:        1.99 cm²  AoV EOA, Contin i:      0.87 cm²/m²  AoV Dimensionless Index 0.41    Mitral Valve Measurements:     MV E Vmax: 1.0 m/s       Tricuspid Valve Measurements:     TV S'             4.2 cm/s  TR Vmax:          3.0 m/s  TR Peak Gradient: 36.7 mmHg  RA Pressure:      15 mmHg  PASP:             52 mmHg

## 2025-04-08 NOTE — PROGRESS NOTE ADULT - SUBJECTIVE AND OBJECTIVE BOX
Patient is a 79y old  Male who presents with a chief complaint of HF eval (08 Apr 2025 11:49)      SUBJECTIVE / OVERNIGHT EVENTS: pt appears more comfortable, pain is better     MEDICATIONS  (STANDING):  albuterol/ipratropium for Nebulization 3 milliLiter(s) Nebulizer every 6 hours  allopurinol 100 milliGRAM(s) Oral daily  apixaban 2.5 milliGRAM(s) Oral two times a day  aspirin enteric coated 81 milliGRAM(s) Oral daily  atorvastatin 20 milliGRAM(s) Oral at bedtime  buMETAnide Injectable 2 milliGRAM(s) IV Push every 12 hours  calcium acetate 667 milliGRAM(s) Oral three times a day with meals  chlorhexidine 2% Cloths 1 Application(s) Topical daily  chlorhexidine 4% Liquid 1 Application(s) Topical <User Schedule>  dextrose 5%. 1000 milliLiter(s) (100 mL/Hr) IV Continuous <Continuous>  dextrose 5%. 1000 milliLiter(s) (50 mL/Hr) IV Continuous <Continuous>  dextrose 50% Injectable 25 Gram(s) IV Push once  dextrose 50% Injectable 12.5 Gram(s) IV Push once  dextrose 50% Injectable 25 Gram(s) IV Push once  epoetin lashawn (EPOGEN) Injectable 80528 Unit(s) IV Push <User Schedule>  fluticasone propionate/ salmeterol 250-50 MICROgram(s) Diskus 1 Dose(s) Inhalation two times a day  folic acid 1 milliGRAM(s) Oral daily  glucagon  Injectable 1 milliGRAM(s) IntraMuscular once  insulin glargine Injectable (LANTUS) 18 Unit(s) SubCutaneous at bedtime  insulin lispro (ADMELOG) corrective regimen sliding scale   SubCutaneous three times a day before meals  insulin lispro Injectable (ADMELOG) 3 Unit(s) SubCutaneous three times a day before meals  metoprolol succinate ER 25 milliGRAM(s) Oral daily  pantoprazole    Tablet 40 milliGRAM(s) Oral every 12 hours  polyethylene glycol 3350 17 Gram(s) Oral two times a day  psyllium Powder 1 Packet(s) Oral daily  sodium chloride 0.65% Nasal 1 Spray(s) Both Nostrils two times a day  tamsulosin 0.4 milliGRAM(s) Oral at bedtime  tiotropium 2.5 MICROgram(s) Inhaler 2 Puff(s) Inhalation daily    MEDICATIONS  (PRN):  acetaminophen     Tablet .. 650 milliGRAM(s) Oral every 6 hours PRN Temp greater or equal to 38C (100.4F), Mild Pain (1 - 3)  bisacodyl 5 milliGRAM(s) Oral every 12 hours PRN Constipation  dextrose Oral Gel 15 Gram(s) Oral once PRN Blood Glucose LESS THAN 70 milliGRAM(s)/deciliter  dextrose Oral Gel 15 Gram(s) Oral once PRN Blood Glucose LESS THAN 70 milliGRAM(s)/deciliter  melatonin 3 milliGRAM(s) Oral at bedtime PRN Insomnia  ondansetron Injectable 4 milliGRAM(s) IV Push once PRN Nausea and/or Vomiting  ondansetron Injectable 4 milliGRAM(s) IV Push every 6 hours PRN Nausea and/or Vomiting  sodium chloride 0.9% lock flush 10 milliLiter(s) IV Push every 1 hour PRN Pre/post blood products, medications, blood draw, and to maintain line patency        CAPILLARY BLOOD GLUCOSE      POCT Blood Glucose.: 158 mg/dL (08 Apr 2025 12:38)  POCT Blood Glucose.: 184 mg/dL (08 Apr 2025 08:44)  POCT Blood Glucose.: 174 mg/dL (07 Apr 2025 22:08)  POCT Blood Glucose.: 162 mg/dL (07 Apr 2025 17:42)    I&O's Summary    07 Apr 2025 07:01  -  08 Apr 2025 07:00  --------------------------------------------------------  IN: 1100 mL / OUT: 3200 mL / NET: -2100 mL        PHYSICAL EXAM:  GENERAL: NAD, well-developed  HEAD:  Atraumatic, Normocephalic  EYES:  conjunctiva and sclera clear  CHEST/LUNG: Clear to auscultation bilaterally; No wheeze  HEART: Regular rate and rhythm; S1S2  ABDOMEN: Soft, Nontender, Nondistended; Bowel sounds present  EXTREMITIES:  anasarca LUE swelling >RUE  PSYCH: AAOx3    LABS:                        10.1   12.46 )-----------( 114      ( 08 Apr 2025 07:21 )             36.4     04-08    138  |  98  |  39[H]  ----------------------------<  161[H]  4.4   |  24  |  4.84[H]    Ca    8.5      08 Apr 2025 07:21  Phos  5.2     04-07  Mg     2.5     04-07    TPro  7.0  /  Alb  3.0[L]  /  TBili  0.5  /  DBili  x   /  AST  27  /  ALT  6[L]  /  AlkPhos  261[H]  04-08          Urinalysis Basic - ( 08 Apr 2025 07:21 )    Color: x / Appearance: x / SG: x / pH: x  Gluc: 161 mg/dL / Ketone: x  / Bili: x / Urobili: x   Blood: x / Protein: x / Nitrite: x   Leuk Esterase: x / RBC: x / WBC x   Sq Epi: x / Non Sq Epi: x / Bacteria: x        RADIOLOGY & ADDITIONAL TESTS:    Imaging Personally Reviewed:    Consultant(s) Notes Reviewed:      Care Discussed with Consultants/Other Providers:

## 2025-04-08 NOTE — PROGRESS NOTE ADULT - ASSESSMENT
78 y/o M with PMH of CAD s/p CABG, HF (combined HFrEF [EF 25%] and HFpEF [G3DD]) w/ ICD, AFib on Xarelto, severe AS s/p TAVR, COPD on 3-4L home O2, CKD3, HTN, HLD, T2DM, and BPH who presents as a transfer from [facility] for HF evaluation. He presented to [place] on 1/10/25 for worsening of his chronic SOB for 2 weeks, with associated increased LE edema and abdominal distention - admitted for acute on chronic hypoxic respiratory failure, treated with IV diuresis & Bipap. Tx to [facility] for further evaluation by HF team. Prolonged hospital course for CHF exacerbation, DENISHA on CKD now requiring HD, RRT/Code stroke 3/30 for lethargy and facial droop, CT head negative. Pulmonary called to consult for acute on chronic hypoxic/hypercapnic respiratory failure requiring Bipap.

## 2025-04-09 DIAGNOSIS — N18.6 END STAGE RENAL DISEASE: ICD-10-CM

## 2025-04-09 LAB
ANION GAP SERPL CALC-SCNC: 14 MMOL/L — SIGNIFICANT CHANGE UP (ref 5–17)
BUN SERPL-MCNC: 46 MG/DL — HIGH (ref 7–23)
CALCIUM SERPL-MCNC: 8.7 MG/DL — SIGNIFICANT CHANGE UP (ref 8.4–10.5)
CHLORIDE SERPL-SCNC: 95 MMOL/L — LOW (ref 96–108)
CO2 SERPL-SCNC: 25 MMOL/L — SIGNIFICANT CHANGE UP (ref 22–31)
CREAT SERPL-MCNC: 5.17 MG/DL — HIGH (ref 0.5–1.3)
EGFR: 11 ML/MIN/1.73M2 — LOW
EGFR: 11 ML/MIN/1.73M2 — LOW
GLUCOSE BLDC GLUCOMTR-MCNC: 110 MG/DL — HIGH (ref 70–99)
GLUCOSE BLDC GLUCOMTR-MCNC: 127 MG/DL — HIGH (ref 70–99)
GLUCOSE BLDC GLUCOMTR-MCNC: 130 MG/DL — HIGH (ref 70–99)
GLUCOSE BLDC GLUCOMTR-MCNC: 168 MG/DL — HIGH (ref 70–99)
GLUCOSE BLDC GLUCOMTR-MCNC: 335 MG/DL — HIGH (ref 70–99)
GLUCOSE SERPL-MCNC: 111 MG/DL — HIGH (ref 70–99)
MAGNESIUM SERPL-MCNC: 2.6 MG/DL — SIGNIFICANT CHANGE UP (ref 1.6–2.6)
PHOSPHATE SERPL-MCNC: 4.9 MG/DL — HIGH (ref 2.5–4.5)
POTASSIUM SERPL-MCNC: 4.6 MMOL/L — SIGNIFICANT CHANGE UP (ref 3.5–5.3)
POTASSIUM SERPL-SCNC: 4.6 MMOL/L — SIGNIFICANT CHANGE UP (ref 3.5–5.3)
SODIUM SERPL-SCNC: 134 MMOL/L — LOW (ref 135–145)

## 2025-04-09 PROCEDURE — 90935 HEMODIALYSIS ONE EVALUATION: CPT

## 2025-04-09 PROCEDURE — 99233 SBSQ HOSP IP/OBS HIGH 50: CPT

## 2025-04-09 RX ORDER — SIMETHICONE 80 MG
80 TABLET,CHEWABLE ORAL EVERY 8 HOURS
Refills: 0 | Status: DISCONTINUED | OUTPATIENT
Start: 2025-04-09 | End: 2025-04-14

## 2025-04-09 RX ADMIN — Medication 100 MILLIGRAM(S): at 13:51

## 2025-04-09 RX ADMIN — POLYETHYLENE GLYCOL 3350 17 GRAM(S): 17 POWDER, FOR SOLUTION ORAL at 17:55

## 2025-04-09 RX ADMIN — APIXABAN 2.5 MILLIGRAM(S): 2.5 TABLET, FILM COATED ORAL at 05:54

## 2025-04-09 RX ADMIN — POLYETHYLENE GLYCOL 3350 17 GRAM(S): 17 POWDER, FOR SOLUTION ORAL at 05:54

## 2025-04-09 RX ADMIN — Medication 1 DOSE(S): at 17:54

## 2025-04-09 RX ADMIN — APIXABAN 2.5 MILLIGRAM(S): 2.5 TABLET, FILM COATED ORAL at 17:55

## 2025-04-09 RX ADMIN — Medication 667 MILLIGRAM(S): at 17:54

## 2025-04-09 RX ADMIN — EPOETIN ALFA 10000 UNIT(S): 10000 SOLUTION INTRAVENOUS; SUBCUTANEOUS at 08:30

## 2025-04-09 RX ADMIN — IPRATROPIUM BROMIDE AND ALBUTEROL SULFATE 3 MILLILITER(S): .5; 2.5 SOLUTION RESPIRATORY (INHALATION) at 13:49

## 2025-04-09 RX ADMIN — Medication 80 MILLIGRAM(S): at 18:48

## 2025-04-09 RX ADMIN — Medication 1 DOSE(S): at 05:53

## 2025-04-09 RX ADMIN — Medication 81 MILLIGRAM(S): at 13:51

## 2025-04-09 RX ADMIN — TIOTROPIUM BROMIDE INHALATION SPRAY 2 PUFF(S): 3.12 SPRAY, METERED RESPIRATORY (INHALATION) at 17:58

## 2025-04-09 RX ADMIN — Medication 1 APPLICATION(S): at 06:16

## 2025-04-09 RX ADMIN — Medication 1 SPRAY(S): at 05:53

## 2025-04-09 RX ADMIN — BUMETANIDE 2 MILLIGRAM(S): 1 TABLET ORAL at 17:56

## 2025-04-09 RX ADMIN — Medication 1 SPRAY(S): at 17:55

## 2025-04-09 RX ADMIN — IPRATROPIUM BROMIDE AND ALBUTEROL SULFATE 3 MILLILITER(S): .5; 2.5 SOLUTION RESPIRATORY (INHALATION) at 17:54

## 2025-04-09 RX ADMIN — INSULIN LISPRO 3 UNIT(S): 100 INJECTION, SOLUTION INTRAVENOUS; SUBCUTANEOUS at 17:57

## 2025-04-09 RX ADMIN — FOLIC ACID 1 MILLIGRAM(S): 1 TABLET ORAL at 13:51

## 2025-04-09 RX ADMIN — Medication 40 MILLIGRAM(S): at 05:54

## 2025-04-09 RX ADMIN — INSULIN GLARGINE-YFGN 18 UNIT(S): 100 INJECTION, SOLUTION SUBCUTANEOUS at 21:38

## 2025-04-09 RX ADMIN — TAMSULOSIN HYDROCHLORIDE 0.4 MILLIGRAM(S): 0.4 CAPSULE ORAL at 21:38

## 2025-04-09 RX ADMIN — Medication 1 PACKET(S): at 21:38

## 2025-04-09 RX ADMIN — ATORVASTATIN CALCIUM 20 MILLIGRAM(S): 80 TABLET, FILM COATED ORAL at 21:38

## 2025-04-09 RX ADMIN — Medication 1 APPLICATION(S): at 12:00

## 2025-04-09 RX ADMIN — IPRATROPIUM BROMIDE AND ALBUTEROL SULFATE 3 MILLILITER(S): .5; 2.5 SOLUTION RESPIRATORY (INHALATION) at 00:38

## 2025-04-09 RX ADMIN — Medication 40 MILLIGRAM(S): at 17:55

## 2025-04-09 RX ADMIN — Medication 667 MILLIGRAM(S): at 13:49

## 2025-04-09 RX ADMIN — IPRATROPIUM BROMIDE AND ALBUTEROL SULFATE 3 MILLILITER(S): .5; 2.5 SOLUTION RESPIRATORY (INHALATION) at 05:53

## 2025-04-09 RX ADMIN — INSULIN LISPRO 3 UNIT(S): 100 INJECTION, SOLUTION INTRAVENOUS; SUBCUTANEOUS at 13:00

## 2025-04-09 NOTE — CHART NOTE - NSCHARTNOTESELECT_GEN_ALL_CORE
Event Note
Heart Failure/Event Note
Off Service Note
Urology
Chest pain/Event Note
Event Note
Event Note/Event Note
HD/Event Note
Heart Failure/Event Note
Heart Failure/Event Note
Hemoptysis/Event Note
Hemoptysis/Event Note
IR Fellow
IR Fellow
IR- follow up
Nephro/Event Note
Nutrition Services
Overnight Event Note/Event Note
Palliative Care/Off Service Note
Palliative Medicine/Event Note
Palliative Medicine/Event Note
RRT for lethargy/Event Note
Xarelto contraindicated/Event Note

## 2025-04-09 NOTE — PROGRESS NOTE ADULT - ASSESSMENT
78 y/o male w/ PMHx CAD s/p CABG, HF (combined HFrEF [EF 25%] and HFpEF [G3DD]) w/ ICD, AFib on Xarelto, severe AS s/p TAVR, COPD on 3-4L home O2, CKD4, HTN, HLD, T2DM, and BPH who presents as a transfer from [facility] for HF evaluation. Ongoing CHF exacerbation now off bumex gtt, new DENISHA on CKD likely 2/2 ongoing cardiorenal and possible component of abdominal compartment syndrome given tremendous ascites, now s/p tunnelled HD catheter.  RRT/Code Stroke on 3/30/25 for lethargy and facial droop, CT negative, likely in the setting of hypercarbia, placed on bipap with improvement.

## 2025-04-09 NOTE — PROGRESS NOTE ADULT - PROBLEM SELECTOR PLAN 1
2nd to underlying COPD. Exacerbated in the setting of volume overload   -CXR with congestion, L effusion/atelectasis  -Keep O>I with HD, diuresis as tolerated  -Continue bronchodilators  -ABGs reviewed. Pt w/ hx EMBER on CPAP at home, failed CPAP this admission and now escalated to Bipap   -Bipap not worn overnight 4/2-4/3, CO2 84 in AM on 4/3. CO2 lowered by 7 points to 77 after several hours on bipap and then lowered further to 64 later in the day after settings adjusted.   -ABG 4/5 acceptable, reflective of Bipap 18/7/30%. Continue Bipap qHS and PRN daytime   -Keep sats >90% with O2  -Pt will need Bipap on discharge (approved through [facility])

## 2025-04-09 NOTE — PROGRESS NOTE ADULT - ASSESSMENT
78 y/o M with PMH of CAD s/p CABG, HF (combined HFrEF [EF 25%] and HFpEF [G3DD]) w/ ICD, AFib on Xarelto, severe AS s/p TAVR, COPD on 3-4L home O2, CKD3, HTN, HLD, T2DM, and BPH who presents as a transfer from [facility] for HF evaluation. He presented to [place] on 1/10/25 for worsening of his chronic SOB for 2 weeks, with associated increased LE edema and abdominal distention - admitted for acute on chronic hypoxic respiratory failure, treated with IV diuresis & Bipap. Tx to [facility] for further evaluation by HF team. Prolonged hospital course for CHF exacerbation, DENISHA on CKD now requiring HD, RRT/Code stroke 3/30 for lethargy and facial droop, CT head negative. Pulmonary called to consult for acute on chronic hypoxic/hypercapnic respiratory failure requiring Bipap.

## 2025-04-09 NOTE — PROGRESS NOTE ADULT - PROBLEM SELECTOR PLAN 1
- Continue monitoring Cr and urine output.   - now on HD MWF  - c/w bipap qhs and prn  - s/p tunneled HD catheter on 3/28  - D/w renal 4/9 will trial daily HD for 4 days to see improvement

## 2025-04-09 NOTE — PROGRESS NOTE ADULT - SUBJECTIVE AND OBJECTIVE BOX
Follow-up Pulm Progress Note    Seen in HD   No new respiratory events overnight.  Denies SOB/CP.     Medications:  MEDICATIONS  (STANDING):  albuterol/ipratropium for Nebulization 3 milliLiter(s) Nebulizer every 6 hours  allopurinol 100 milliGRAM(s) Oral daily  apixaban 2.5 milliGRAM(s) Oral two times a day  aspirin enteric coated 81 milliGRAM(s) Oral daily  atorvastatin 20 milliGRAM(s) Oral at bedtime  buMETAnide Injectable 2 milliGRAM(s) IV Push every 12 hours  calcium acetate 667 milliGRAM(s) Oral three times a day with meals  chlorhexidine 2% Cloths 1 Application(s) Topical daily  chlorhexidine 4% Liquid 1 Application(s) Topical <User Schedule>  dextrose 5%. 1000 milliLiter(s) (50 mL/Hr) IV Continuous <Continuous>  dextrose 5%. 1000 milliLiter(s) (100 mL/Hr) IV Continuous <Continuous>  dextrose 50% Injectable 25 Gram(s) IV Push once  dextrose 50% Injectable 12.5 Gram(s) IV Push once  dextrose 50% Injectable 25 Gram(s) IV Push once  epoetin lashawn (EPOGEN) Injectable 07669 Unit(s) IV Push <User Schedule>  fluticasone propionate/ salmeterol 250-50 MICROgram(s) Diskus 1 Dose(s) Inhalation two times a day  folic acid 1 milliGRAM(s) Oral daily  glucagon  Injectable 1 milliGRAM(s) IntraMuscular once  insulin glargine Injectable (LANTUS) 18 Unit(s) SubCutaneous at bedtime  insulin lispro (ADMELOG) corrective regimen sliding scale   SubCutaneous three times a day before meals  insulin lispro Injectable (ADMELOG) 3 Unit(s) SubCutaneous three times a day before meals  metoprolol succinate ER 25 milliGRAM(s) Oral daily  pantoprazole    Tablet 40 milliGRAM(s) Oral every 12 hours  polyethylene glycol 3350 17 Gram(s) Oral two times a day  psyllium Powder 1 Packet(s) Oral daily  sodium chloride 0.65% Nasal 1 Spray(s) Both Nostrils two times a day  tamsulosin 0.4 milliGRAM(s) Oral at bedtime  tiotropium 2.5 MICROgram(s) Inhaler 2 Puff(s) Inhalation daily    MEDICATIONS  (PRN):  acetaminophen     Tablet .. 650 milliGRAM(s) Oral every 6 hours PRN Temp greater or equal to 38C (100.4F), Mild Pain (1 - 3)  bisacodyl 5 milliGRAM(s) Oral every 12 hours PRN Constipation  dextrose Oral Gel 15 Gram(s) Oral once PRN Blood Glucose LESS THAN 70 milliGRAM(s)/deciliter  dextrose Oral Gel 15 Gram(s) Oral once PRN Blood Glucose LESS THAN 70 milliGRAM(s)/deciliter  melatonin 3 milliGRAM(s) Oral at bedtime PRN Insomnia  ondansetron Injectable 4 milliGRAM(s) IV Push once PRN Nausea and/or Vomiting  ondansetron Injectable 4 milliGRAM(s) IV Push every 6 hours PRN Nausea and/or Vomiting  sodium chloride 0.9% lock flush 10 milliLiter(s) IV Push every 1 hour PRN Pre/post blood products, medications, blood draw, and to maintain line patency          Vital Signs Last 24 Hrs  T(C): 36.2 (09 Apr 2025 07:50), Max: 36.6 (08 Apr 2025 20:02)  T(F): 97.2 (09 Apr 2025 07:50), Max: 97.9 (09 Apr 2025 05:16)  HR: 84 (09 Apr 2025 07:50) (69 - 84)  BP: 113/59 (09 Apr 2025 07:50) (100/60 - 125/81)  BP(mean): --  RR: 19 (09 Apr 2025 07:50) (18 - 19)  SpO2: 96% (09 Apr 2025 07:50) (95% - 98%)    Parameters below as of 09 Apr 2025 07:50  Patient On (Oxygen Delivery Method): nasal cannula  O2 Flow (L/min): 3            04-08 @ 07:01  -  04-09 @ 07:00  --------------------------------------------------------  IN: 810 mL / OUT: 0 mL / NET: 810 mL          LABS:                        10.1   12.46 )-----------( 114      ( 08 Apr 2025 07:21 )             36.4     04-09    134[L]  |  95[L]  |  46[H]  ----------------------------<  111[H]  4.6   |  25  |  5.17[H]    Ca    8.7      09 Apr 2025 07:17  Phos  4.9     04-09  Mg     2.6     04-09    TPro  7.0  /  Alb  3.0[L]  /  TBili  0.5  /  DBili  x   /  AST  27  /  ALT  6[L]  /  AlkPhos  261[H]  04-08          CAPILLARY BLOOD GLUCOSE      POCT Blood Glucose.: 168 mg/dL (09 Apr 2025 07:02)      Urinalysis Basic - ( 09 Apr 2025 07:17 )    Color: x / Appearance: x / SG: x / pH: x  Gluc: 111 mg/dL / Ketone: x  / Bili: x / Urobili: x   Blood: x / Protein: x / Nitrite: x   Leuk Esterase: x / RBC: x / WBC x   Sq Epi: x / Non Sq Epi: x / Bacteria: x                CULTURES:    Culture - Blood (collected 03-30-25 @ 17:36)  Source: Blood Blood-Peripheral  Final Report (04-04-25 @ 22:00):    No growth at 5 days              Physical Examination:  PULM: Decreased BS   CVS: S1, S2 heard    RADIOLOGY REVIEWED  CXR: congestion  L effusion/atelectasis     TRANSTHORACIC ECHOCARDIOGRAM REPORT  ________________________________________________________________________________                                      _______       Pt. Name:       JO VARELA Study Date:    2/7/2025  MRN:            [id number]    YOB: 1945  Accession #:    [id number]     Age:           79 years  Account#:       [id number]  Gender:        M  Heart Rate:     63 bpm        Height:        71.00 in (180.34 cm)  Rhythm:         sinus rhythm  Weight:        242.50 lb (110.00 kg)  Blood Pressure: 106/67 mmHg   BSA/BMI:       2.29 m² / 33.82 kg/m²  ________________________________________________________________________________________  Referring Physician:    [id number] lAiyah Correa  Interpreting Physician: Jo Lin MD  Primary Sonographer:    Micah Pedraza [facility]  Fellow (Performing):    Ana M oNyola MD    CPT:                ECHO TTE WITH CON COMP W DOPP - .m;DEFINITY ECHO                      CONTRAST PER ML - .m;DEFINITY ECHO CONTRAST PER ML                      WASTED - .m  Indication(s):      Dyspnea, unspecified - R06.00  Procedure:          Transthoracic echocardiogram with 2-D, M-mode and complete                      spectral and color flow Doppler.  Ordering Location:  [facility]  Admission Status:   Inpatient  Contrast Injection: Verbal consent was obtained for injection of Ultrasonic                      Enhancing Agent following a discussion of risks and                      benefits.                      Endocardial visualization enhanced with 2 ml of Definity                      Ultrasound enhancing agent (Lot#:[id number] Exp.Date:2025-AUG                      Discarded Dose:8ml).  UEA Reaction:       Patient had no adverse reaction after injection of                      Ultrasound Enhancing Agent.  Study Information:  Image quality for this study is technically difficult.    _______________________________________________________________________________________     CONCLUSIONS:      1. Definity ultrasound enhancing agent was given for enhanced left ventricular opacification and improved delineation of the left ventricular endocardial borders.   2. Left ventricular wall thickness is normal. Left ventricular systolic function is severely decreased with an ejection fraction visually estimated at 30 %. Global left ventricular hypokinesis.   3. Multiple segmental abnormalities exist. See findings.   4. At least "moderate" mitral regurgitation.   5. 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 Trace intravalvular regurgitation.   6. Severely enlarged right ventricular cavity size and reduced right ventricular systolic function.   7. Probably severe tricuspid regurgitation.   8. Device lead is visualized in the right heart.    ________________________________________________________________________________________  FINDINGS:     Left Ventricle:  After obtaining consent, Definity ultrasound enhancing agent was given for enhanced left ventricular opacification and improved delineation of the left ventricular endocardial borders. Left ventricular wall thickness is normal. Left ventricular systolic function is severely decreased with an ejection fraction visually estimated at 30%. There is global left ventricular hypokinesis. There is no evidence of a left ventricular thrombus.  LV Wall Scoring: The entire apex is akinetic. The basal and mid anterior wall,  basal and mid anterolateral wall, basal and mid anterior septum, basal and mid  inferior septum, basal and mid inferior wall, and basal and mid inferolateral  wall are hypokinetic.          Right Ventricle:  The right ventricular cavity is severely enlarged in size and right ventricular systolic function is reduced. Tricuspid annular plane systolic excursion (TAPSE) is 1.0 cm (normal >=1.7 cm). Tricuspid annular tissue Doppler S' is 4.2 cm/s (normal >10 cm/s). A device lead is visualized in the right heart.     Left Atrium:  The left atrium is moderately dilated with an indexed volume of 43.69 ml/m².     Right Atrium:  The right atrium is severely dilated with an indexed volume of 48.50 ml/m².     Interatrial Septum:  The interatrial septum appears intact.     Aortic Valve:  A a transcatheter deployed (TAVR) is present in the aortic position. The prosthetic valve has normal function. The peak transaortic velocity is 2.00 m/s, peak transaortic gradient is 16.0 mmHg and mean transaortic gradient is 9.0 mmHg with an LVOT/aortic valve VTI ratio of 0.41. The effective orifice area is estimated at 1.99 cm² by the continuity equation. There is trace intravalvular regurgitation.     Mitral Valve:  There is at least "moderate" mitral regurgitation.     Tricuspid Valve:  There is probably severe tricuspid regurgitation. Estimated pulmonary artery systolic pressure is 52 mmHg, consistent with mild to moderate pulmonary hypertension.     Pulmonic Valve:  There is mild pulmonic regurgitation.     Aorta:  The aortic root appears normal in size.     Pericardium:  No pericardial effusion seen.     Systemic Veins:  The inferior vena cava is dilated measuring 2.35 cm in diameter, (dilated >2.1cm) with abnormal inspiratory collapse (abnormal <50%) consistent with elevated right atrial pressure (~15, range 10-20mmHg).  ____________________________________________________________________  QUANTITATIVE DATA:  Left Ventricle Measurements: (Indexed to BSA)     IVSd (2D):   1.1 cm  LVPWd (2D):  1.1 cm  LVIDd (2D):  5.3 cm  LVIDs (2D):  4.5 cm  LV Mass:     228 g  99.5 g/m²  Visualized LV EF%: 30%     MV E Vmax:    1.05 m/s  e' lateral:   4.73 cm/s  e' medial:    4.16 cm/s  E/e' lateral: 22.20  E/e' medial:  25.24  E/e' Average: 23.62    Aorta Measurements: (Normal range) (Indexed to BSA)     Ao Root d     3.60 cm (3.1 - 3.7 cm) 1.57 cm/m²  Ao Asc d, 2D: 3.60  Ao Asc prox:  3.60 cm                1.57 cm/m²  Ao Arch:      2.8 cm            Left Atrium Measurements: (Indexed to BSA)  LA Diam 2D:        5.70 cm  LA Vol s, MOD A4C: 85.80 ml.  LA Vol s, MOD A2C: 116.00 ml.  LA Vol s, MOD BP:  100.00 ml  43.69 ml/m²       Right Ventricle Measurements: Right Atrial Measurements:     TAPSE:           1.0 cm       RA Vol:            111.00 ml  RV Base (RVID1): 5.6 cm       RA Vol s, MOD A4C  111.0 ml  RV Mid (RVID2):  4.7 cm       RA Vol Index:      48.50 ml/m²                                RA Area s, MOD A4C 29.2 cm²       LVOT / RVOT/ Qp/Qs Data: (Indexed to BSA)  LVOT Diameter,s: 2.50 cm  LVOT Area:       4.91 cm²  LVOT Vmax:       0.92 m/s  LVOT Vmn:        0.622 m/s  LVOT VTI:        16.00 cm  LVOT peak grad:  3 mmHg  LVOT mean grad:  1.5 mmHg  LVOT SV:         78.5 ml   34.31 ml/m²    Aortic Valve Measurements:  AV Vmax:                2.0 m/s  AV Peak Gradient:       16.0 mmHg  AV Mean Gradient:       9.0 mmHg  AV VTI:                 39.4 cm  AV VTI Ratio:           0.41  AoV EOA, Contin:        1.99 cm²  AoV EOA, Contin i:      0.87 cm²/m²  AoV Dimensionless Index 0.41    Mitral Valve Measurements:     MV E Vmax: 1.0 m/s       Tricuspid Valve Measurements:     TV S'             4.2 cm/s  TR Vmax:          3.0 m/s  TR Peak Gradient: 36.7 mmHg  RA Pressure:      15 mmHg  PASP:             52 mmHg

## 2025-04-09 NOTE — CHART NOTE - NSCHARTNOTEFT_GEN_A_CORE
Palliative team reconsulted for GOC.  At this time goals remain for ongoing HD and medical management. Current challenges are regarding next steps and disposition planning given patient's debilitated state.  CM assisting with dispo planning. Palliative team remains available as needed if further GOC disucssions indicated.  Case discussed with Dr. Sheets.    Ifrah Johnson MD  Geriatrics and Palliative Medicine Attending  [facility] pager: [phone]

## 2025-04-09 NOTE — PROGRESS NOTE ADULT - SUBJECTIVE AND OBJECTIVE BOX
Patient is a 79y old  Male who presents with a chief complaint of HF eval (09 Apr 2025 11:00)      SUBJECTIVE / OVERNIGHT EVENTS: no acute events overnight     MEDICATIONS  (STANDING):  albuterol/ipratropium for Nebulization 3 milliLiter(s) Nebulizer every 6 hours  allopurinol 100 milliGRAM(s) Oral daily  apixaban 2.5 milliGRAM(s) Oral two times a day  aspirin enteric coated 81 milliGRAM(s) Oral daily  atorvastatin 20 milliGRAM(s) Oral at bedtime  buMETAnide Injectable 2 milliGRAM(s) IV Push every 12 hours  calcium acetate 667 milliGRAM(s) Oral three times a day with meals  chlorhexidine 2% Cloths 1 Application(s) Topical daily  chlorhexidine 4% Liquid 1 Application(s) Topical <User Schedule>  dextrose 5%. 1000 milliLiter(s) (50 mL/Hr) IV Continuous <Continuous>  dextrose 5%. 1000 milliLiter(s) (100 mL/Hr) IV Continuous <Continuous>  dextrose 50% Injectable 25 Gram(s) IV Push once  dextrose 50% Injectable 12.5 Gram(s) IV Push once  dextrose 50% Injectable 25 Gram(s) IV Push once  epoetin lashawn (EPOGEN) Injectable 45331 Unit(s) IV Push <User Schedule>  fluticasone propionate/ salmeterol 250-50 MICROgram(s) Diskus 1 Dose(s) Inhalation two times a day  folic acid 1 milliGRAM(s) Oral daily  glucagon  Injectable 1 milliGRAM(s) IntraMuscular once  insulin glargine Injectable (LANTUS) 18 Unit(s) SubCutaneous at bedtime  insulin lispro (ADMELOG) corrective regimen sliding scale   SubCutaneous three times a day before meals  insulin lispro Injectable (ADMELOG) 3 Unit(s) SubCutaneous three times a day before meals  metoprolol succinate ER 25 milliGRAM(s) Oral daily  pantoprazole    Tablet 40 milliGRAM(s) Oral every 12 hours  polyethylene glycol 3350 17 Gram(s) Oral two times a day  psyllium Powder 1 Packet(s) Oral daily  sodium chloride 0.65% Nasal 1 Spray(s) Both Nostrils two times a day  tamsulosin 0.4 milliGRAM(s) Oral at bedtime  tiotropium 2.5 MICROgram(s) Inhaler 2 Puff(s) Inhalation daily    MEDICATIONS  (PRN):  acetaminophen     Tablet .. 650 milliGRAM(s) Oral every 6 hours PRN Temp greater or equal to 38C (100.4F), Mild Pain (1 - 3)  bisacodyl 5 milliGRAM(s) Oral every 12 hours PRN Constipation  dextrose Oral Gel 15 Gram(s) Oral once PRN Blood Glucose LESS THAN 70 milliGRAM(s)/deciliter  dextrose Oral Gel 15 Gram(s) Oral once PRN Blood Glucose LESS THAN 70 milliGRAM(s)/deciliter  melatonin 3 milliGRAM(s) Oral at bedtime PRN Insomnia  ondansetron Injectable 4 milliGRAM(s) IV Push every 6 hours PRN Nausea and/or Vomiting  ondansetron Injectable 4 milliGRAM(s) IV Push once PRN Nausea and/or Vomiting  sodium chloride 0.9% lock flush 10 milliLiter(s) IV Push every 1 hour PRN Pre/post blood products, medications, blood draw, and to maintain line patency        CAPILLARY BLOOD GLUCOSE      POCT Blood Glucose.: 130 mg/dL (09 Apr 2025 12:41)  POCT Blood Glucose.: 110 mg/dL (09 Apr 2025 11:31)  POCT Blood Glucose.: 168 mg/dL (09 Apr 2025 07:02)  POCT Blood Glucose.: 129 mg/dL (08 Apr 2025 21:27)  POCT Blood Glucose.: 141 mg/dL (08 Apr 2025 17:11)    I&O's Summary    08 Apr 2025 07:01  -  09 Apr 2025 07:00  --------------------------------------------------------  IN: 810 mL / OUT: 0 mL / NET: 810 mL    09 Apr 2025 07:01  -  09 Apr 2025 13:50  --------------------------------------------------------  IN: 0 mL / OUT: 2500 mL / NET: -2500 mL        PHYSICAL EXAM:  GENERAL: NAD, well-developed, obese   HEAD:  Atraumatic, Normocephalic  EYES:  conjunctiva and sclera clear  CHEST/LUNG: +crackels bases   HEART: Regular rate and rhythm; S1S2  ABDOMEN: Soft, Nontender, Nondistended; Bowel sounds present  EXTREMITIES:  anasarca LUE swelling >RUE  PSYCH: AAOx3    LABS:                        10.1   12.46 )-----------( 114      ( 08 Apr 2025 07:21 )             36.4     04-09    134[L]  |  95[L]  |  46[H]  ----------------------------<  111[H]  4.6   |  25  |  5.17[H]    Ca    8.7      09 Apr 2025 07:17  Phos  4.9     04-09  Mg     2.6     04-09    TPro  7.0  /  Alb  3.0[L]  /  TBili  0.5  /  DBili  x   /  AST  27  /  ALT  6[L]  /  AlkPhos  261[H]  04-08          Urinalysis Basic - ( 09 Apr 2025 07:17 )    Color: x / Appearance: x / SG: x / pH: x  Gluc: 111 mg/dL / Ketone: x  / Bili: x / Urobili: x   Blood: x / Protein: x / Nitrite: x   Leuk Esterase: x / RBC: x / WBC x   Sq Epi: x / Non Sq Epi: x / Bacteria: x        RADIOLOGY & ADDITIONAL TESTS:    Imaging Personally Reviewed:    Consultant(s) Notes Reviewed:      Care Discussed with Consultants/Other Providers:

## 2025-04-09 NOTE — CHART NOTE - NSCHARTNOTEFT_GEN_A_CORE
[facility] DIVISION OF KIDNEY DISEASE AND HYPERTENSION  [phone]    Communication Note    After further chart review and discussion of patient's clinical status/plan of care with primary med attending Dr. Sheets, will attempt daily alternating HD/UF sessions through Saturday to better optimize volume status in hopes of improving functional status for participation in PT.    Patient s/p HD with 2.5L UF today.  Orders placed for UF only tx tomorrow with goal 2L UF.    Please do not hesitate to TEAMS Dr. Shirley if further input needed during the day.  For questions Weekdays after 5PM and on weekends, please page the Renal Fellow on call.

## 2025-04-09 NOTE — PROGRESS NOTE ADULT - SUBJECTIVE AND OBJECTIVE BOX
[facility] DIVISION OF KIDNEY DISEASE AND HYPERTENSION  [phone]    RENAL FOLLOW UP NOTE- NEPHROHOSPITALIST  --------------------------------------------------------------------------------  Patient known to Dr. Acosta, who has transitioned care of her patients at [facility] to the [facility] services as of 04/09/2025    Patient seen and examined on HD at 9:55AM. Tolerating treatment    PAST HISTORY  --------------------------------------------------------------------------------  No significant changes to PMH, PSH, FHx, SHx, unless otherwise noted    ALLERGIES & MEDICATIONS  --------------------------------------------------------------------------------  Allergies    No Known Allergies    Intolerances      Standing Inpatient Medications  albuterol/ipratropium for Nebulization 3 milliLiter(s) Nebulizer every 6 hours  allopurinol 100 milliGRAM(s) Oral daily  apixaban 2.5 milliGRAM(s) Oral two times a day  aspirin enteric coated 81 milliGRAM(s) Oral daily  atorvastatin 20 milliGRAM(s) Oral at bedtime  buMETAnide Injectable 2 milliGRAM(s) IV Push every 12 hours  calcium acetate 667 milliGRAM(s) Oral three times a day with meals  chlorhexidine 2% Cloths 1 Application(s) Topical daily  chlorhexidine 4% Liquid 1 Application(s) Topical <User Schedule>  dextrose 5%. 1000 milliLiter(s) IV Continuous <Continuous>  dextrose 5%. 1000 milliLiter(s) IV Continuous <Continuous>  dextrose 50% Injectable 25 Gram(s) IV Push once  dextrose 50% Injectable 12.5 Gram(s) IV Push once  dextrose 50% Injectable 25 Gram(s) IV Push once  epoetin lashawn (EPOGEN) Injectable 41172 Unit(s) IV Push <User Schedule>  fluticasone propionate/ salmeterol 250-50 MICROgram(s) Diskus 1 Dose(s) Inhalation two times a day  folic acid 1 milliGRAM(s) Oral daily  glucagon  Injectable 1 milliGRAM(s) IntraMuscular once  insulin glargine Injectable (LANTUS) 18 Unit(s) SubCutaneous at bedtime  insulin lispro (ADMELOG) corrective regimen sliding scale   SubCutaneous three times a day before meals  insulin lispro Injectable (ADMELOG) 3 Unit(s) SubCutaneous three times a day before meals  metoprolol succinate ER 25 milliGRAM(s) Oral daily  pantoprazole    Tablet 40 milliGRAM(s) Oral every 12 hours  polyethylene glycol 3350 17 Gram(s) Oral two times a day  psyllium Powder 1 Packet(s) Oral daily  sodium chloride 0.65% Nasal 1 Spray(s) Both Nostrils two times a day  tamsulosin 0.4 milliGRAM(s) Oral at bedtime  tiotropium 2.5 MICROgram(s) Inhaler 2 Puff(s) Inhalation daily    PRN Inpatient Medications  acetaminophen     Tablet .. 650 milliGRAM(s) Oral every 6 hours PRN  bisacodyl 5 milliGRAM(s) Oral every 12 hours PRN  dextrose Oral Gel 15 Gram(s) Oral once PRN  dextrose Oral Gel 15 Gram(s) Oral once PRN  melatonin 3 milliGRAM(s) Oral at bedtime PRN  ondansetron Injectable 4 milliGRAM(s) IV Push once PRN  ondansetron Injectable 4 milliGRAM(s) IV Push every 6 hours PRN  sodium chloride 0.9% lock flush 10 milliLiter(s) IV Push every 1 hour PRN      FOCUSED REVIEW OF SYSTEMS  --------------------------------------------------------------------------------  denies fevers/rigors  denies CP/palpitations  denies SOB at rest  denies N/V      VITALS/PHYSICAL EXAM  --------------------------------------------------------------------------------  T(C): 36.2 (04-09-25 @ 07:50), Max: 36.6 (04-08-25 @ 20:02)  HR: 84 (04-09-25 @ 07:50) (69 - 84)  BP: 113/59 (04-09-25 @ 07:50) (100/60 - 125/81)  RR: 19 (04-09-25 @ 07:50) (18 - 19)  SpO2: 96% (04-09-25 @ 07:50) (95% - 98%)  Wt(kg): --        04-08-25 @ 07:01  -  04-09-25 @ 07:00  --------------------------------------------------------  IN: 810 mL / OUT: 0 mL / NET: 810 mL      Physical Exam:  	Gen: NAD, lying in bed  	Pulm: CTA B/L ant/lat fields  	CV: RRR, S1S2  	Abd: +BS, soft, nontender/nondistended  	: No suprapubic tenderness.  no ro          Extremity: b/l LE pitting edema to upper shins with b/l compression wraps in place  	Access: R chest tunneled catheter being utilized for HD, dried blood noted at exit site under dressing      LABS/STUDIES  --------------------------------------------------------------------------------              10.1   12.46 >-----------<  114      [04-08-25 @ 07:21]              36.4     134  |  95  |  46  ----------------------------<  111      [04-09-25 @ 07:17]  4.6   |  25  |  5.17        Ca     8.7     [04-09-25 @ 07:17]      Mg     2.6     [04-09-25 @ 07:17]      Phos  4.9     [04-09-25 @ 07:17]    TPro  7.0  /  Alb  3.0  /  TBili  0.5  /  DBili  x   /  AST  27  /  ALT  6   /  AlkPhos  261  [04-08-25 @ 07:21]            Creatinine Trend:  SCr 5.17 [04-09 @ 07:17]  SCr 4.84 [04-08 @ 07:21]  SCr 5.77 [04-07 @ 07:08]  SCr 5.30 [04-06 @ 06:54]  SCr 4.77 [04-05 @ 04:59]              Urinalysis - [04-09-25 @ 07:17]      Color  / Appearance  / SG  / pH       Gluc 111 / Ketone   / Bili  / Urobili        Blood  / Protein  / Leuk Est  / Nitrite       RBC  / WBC  / Hyaline  / Gran  / Sq Epi  / Non Sq Epi  / Bacteria       Iron 16, TIBC 243, %sat 6      [03-15-25 @ 05:59]  Ferritin 178      [01-15-25 @ 20:10]  TSH 3.44      [03-30-25 @ 11:04]  Lipid: chol 123, TG 91, HDL 33, LDL --      [08-08-24 @ 06:50]    Syphilis Screen (Treponema Pallidum Ab) Negative      [03-30-25 @ 11:04]

## 2025-04-09 NOTE — PROGRESS NOTE ADULT - PROBLEM SELECTOR PLAN 1
Patient known to Dr. Acosta, who has transitioned care of this patient to [facility] while patient remains hospitalized  newly ESRD started on HD this admission  s/p permcath 03/28  seen on HD today, tolerating treatment  UF as tolerated, remains volume overloaded.  Continues on IV bumex    as per chart, declined RHC/cardiomems  phos at goal, no need for binders

## 2025-04-10 LAB
ANION GAP SERPL CALC-SCNC: 16 MMOL/L — SIGNIFICANT CHANGE UP (ref 5–17)
BASE EXCESS BLDA CALC-SCNC: 1.3 MMOL/L — SIGNIFICANT CHANGE UP (ref -2–3)
BUN SERPL-MCNC: 37 MG/DL — HIGH (ref 7–23)
CALCIUM SERPL-MCNC: 8.4 MG/DL — SIGNIFICANT CHANGE UP (ref 8.4–10.5)
CHLORIDE SERPL-SCNC: 98 MMOL/L — SIGNIFICANT CHANGE UP (ref 96–108)
CO2 BLDA-SCNC: 31 MMOL/L — HIGH (ref 19–24)
CO2 SERPL-SCNC: 19 MMOL/L — LOW (ref 22–31)
CREAT SERPL-MCNC: 3.96 MG/DL — HIGH (ref 0.5–1.3)
EGFR: 14 ML/MIN/1.73M2 — LOW
EGFR: 14 ML/MIN/1.73M2 — LOW
GLUCOSE BLDC GLUCOMTR-MCNC: 101 MG/DL — HIGH (ref 70–99)
GLUCOSE BLDC GLUCOMTR-MCNC: 132 MG/DL — HIGH (ref 70–99)
GLUCOSE BLDC GLUCOMTR-MCNC: 138 MG/DL — HIGH (ref 70–99)
GLUCOSE SERPL-MCNC: 135 MG/DL — HIGH (ref 70–99)
HCO3 BLDA-SCNC: 29 MMOL/L — HIGH (ref 21–28)
HCT VFR BLD CALC: 40.3 % — SIGNIFICANT CHANGE UP (ref 39–50)
HGB BLD-MCNC: 11 G/DL — LOW (ref 13–17)
HOROWITZ INDEX BLDA+IHG-RTO: 32 — SIGNIFICANT CHANGE UP
MCHC RBC-ENTMCNC: 25.8 PG — LOW (ref 27–34)
MCHC RBC-ENTMCNC: 27.3 G/DL — LOW (ref 32–36)
MCV RBC AUTO: 94.6 FL — SIGNIFICANT CHANGE UP (ref 80–100)
NRBC BLD AUTO-RTO: 0 /100 WBCS — SIGNIFICANT CHANGE UP (ref 0–0)
PCO2 BLDA: 62 MMHG — HIGH (ref 35–48)
PH BLDA: 7.28 — LOW (ref 7.35–7.45)
PLATELET # BLD AUTO: 106 K/UL — LOW (ref 150–400)
PO2 BLDA: 95 MMHG — SIGNIFICANT CHANGE UP (ref 83–108)
POTASSIUM SERPL-MCNC: 5.1 MMOL/L — SIGNIFICANT CHANGE UP (ref 3.5–5.3)
POTASSIUM SERPL-SCNC: 5.1 MMOL/L — SIGNIFICANT CHANGE UP (ref 3.5–5.3)
RBC # BLD: 4.26 M/UL — SIGNIFICANT CHANGE UP (ref 4.2–5.8)
RBC # FLD: 19.6 % — HIGH (ref 10.3–14.5)
SAO2 % BLDA: 99.3 % — HIGH (ref 94–98)
SODIUM SERPL-SCNC: 133 MMOL/L — LOW (ref 135–145)
WBC # BLD: 14.55 K/UL — HIGH (ref 3.8–10.5)
WBC # FLD AUTO: 14.55 K/UL — HIGH (ref 3.8–10.5)

## 2025-04-10 PROCEDURE — 99232 SBSQ HOSP IP/OBS MODERATE 35: CPT

## 2025-04-10 PROCEDURE — 99233 SBSQ HOSP IP/OBS HIGH 50: CPT

## 2025-04-10 RX ADMIN — Medication 1 DOSE(S): at 20:30

## 2025-04-10 RX ADMIN — BUMETANIDE 2 MILLIGRAM(S): 1 TABLET ORAL at 20:32

## 2025-04-10 RX ADMIN — Medication 1 APPLICATION(S): at 05:53

## 2025-04-10 RX ADMIN — Medication 1 SPRAY(S): at 06:07

## 2025-04-10 RX ADMIN — Medication 1 PACKET(S): at 21:10

## 2025-04-10 RX ADMIN — Medication 40 MILLIGRAM(S): at 20:33

## 2025-04-10 RX ADMIN — IPRATROPIUM BROMIDE AND ALBUTEROL SULFATE 3 MILLILITER(S): .5; 2.5 SOLUTION RESPIRATORY (INHALATION) at 23:00

## 2025-04-10 RX ADMIN — INSULIN GLARGINE-YFGN 18 UNIT(S): 100 INJECTION, SOLUTION SUBCUTANEOUS at 21:57

## 2025-04-10 RX ADMIN — APIXABAN 2.5 MILLIGRAM(S): 2.5 TABLET, FILM COATED ORAL at 20:32

## 2025-04-10 RX ADMIN — TIOTROPIUM BROMIDE INHALATION SPRAY 2 PUFF(S): 3.12 SPRAY, METERED RESPIRATORY (INHALATION) at 20:30

## 2025-04-10 RX ADMIN — ATORVASTATIN CALCIUM 20 MILLIGRAM(S): 80 TABLET, FILM COATED ORAL at 21:09

## 2025-04-10 RX ADMIN — Medication 4 MILLIGRAM(S): at 06:08

## 2025-04-10 RX ADMIN — IPRATROPIUM BROMIDE AND ALBUTEROL SULFATE 3 MILLILITER(S): .5; 2.5 SOLUTION RESPIRATORY (INHALATION) at 20:30

## 2025-04-10 RX ADMIN — IPRATROPIUM BROMIDE AND ALBUTEROL SULFATE 3 MILLILITER(S): .5; 2.5 SOLUTION RESPIRATORY (INHALATION) at 06:08

## 2025-04-10 RX ADMIN — Medication 667 MILLIGRAM(S): at 09:17

## 2025-04-10 RX ADMIN — Medication 100 MILLIGRAM(S): at 20:32

## 2025-04-10 RX ADMIN — FOLIC ACID 1 MILLIGRAM(S): 1 TABLET ORAL at 22:01

## 2025-04-10 RX ADMIN — POLYETHYLENE GLYCOL 3350 17 GRAM(S): 17 POWDER, FOR SOLUTION ORAL at 06:08

## 2025-04-10 RX ADMIN — Medication 1 APPLICATION(S): at 12:00

## 2025-04-10 RX ADMIN — Medication 1 DOSE(S): at 06:08

## 2025-04-10 RX ADMIN — Medication 1 SPRAY(S): at 20:26

## 2025-04-10 RX ADMIN — Medication 4 MILLIGRAM(S): at 00:08

## 2025-04-10 RX ADMIN — Medication 81 MILLIGRAM(S): at 20:32

## 2025-04-10 RX ADMIN — IPRATROPIUM BROMIDE AND ALBUTEROL SULFATE 3 MILLILITER(S): .5; 2.5 SOLUTION RESPIRATORY (INHALATION) at 00:43

## 2025-04-10 RX ADMIN — TAMSULOSIN HYDROCHLORIDE 0.4 MILLIGRAM(S): 0.4 CAPSULE ORAL at 21:10

## 2025-04-10 RX ADMIN — INSULIN LISPRO 3 UNIT(S): 100 INJECTION, SOLUTION INTRAVENOUS; SUBCUTANEOUS at 09:18

## 2025-04-10 RX ADMIN — Medication 40 MILLIGRAM(S): at 06:07

## 2025-04-10 RX ADMIN — APIXABAN 2.5 MILLIGRAM(S): 2.5 TABLET, FILM COATED ORAL at 06:08

## 2025-04-10 RX ADMIN — POLYETHYLENE GLYCOL 3350 17 GRAM(S): 17 POWDER, FOR SOLUTION ORAL at 20:26

## 2025-04-10 NOTE — PROGRESS NOTE ADULT - SUBJECTIVE AND OBJECTIVE BOX
[facility] DIVISION OF KIDNEY DISEASE AND HYPERTENSION  [phone]    RENAL FOLLOW UP NOTE- NEPHROHOSPITALIST  --------------------------------------------------------------------------------  Patient seen and examined this morning.  Scheduled for UF only treatment    PAST HISTORY  --------------------------------------------------------------------------------  No significant changes to PMH, PSH, FHx, SHx, unless otherwise noted    ALLERGIES & MEDICATIONS  --------------------------------------------------------------------------------  Allergies    No Known Allergies    Intolerances      Standing Inpatient Medications  albuterol/ipratropium for Nebulization 3 milliLiter(s) Nebulizer every 6 hours  allopurinol 100 milliGRAM(s) Oral daily  apixaban 2.5 milliGRAM(s) Oral two times a day  aspirin enteric coated 81 milliGRAM(s) Oral daily  atorvastatin 20 milliGRAM(s) Oral at bedtime  buMETAnide Injectable 2 milliGRAM(s) IV Push every 12 hours  calcium acetate 667 milliGRAM(s) Oral three times a day with meals  chlorhexidine 2% Cloths 1 Application(s) Topical daily  chlorhexidine 4% Liquid 1 Application(s) Topical <User Schedule>  dextrose 5%. 1000 milliLiter(s) IV Continuous <Continuous>  dextrose 5%. 1000 milliLiter(s) IV Continuous <Continuous>  dextrose 50% Injectable 25 Gram(s) IV Push once  dextrose 50% Injectable 12.5 Gram(s) IV Push once  dextrose 50% Injectable 25 Gram(s) IV Push once  epoetin lashawn (EPOGEN) Injectable 83995 Unit(s) IV Push <User Schedule>  fluticasone propionate/ salmeterol 250-50 MICROgram(s) Diskus 1 Dose(s) Inhalation two times a day  folic acid 1 milliGRAM(s) Oral daily  glucagon  Injectable 1 milliGRAM(s) IntraMuscular once  insulin glargine Injectable (LANTUS) 18 Unit(s) SubCutaneous at bedtime  insulin lispro (ADMELOG) corrective regimen sliding scale   SubCutaneous three times a day before meals  insulin lispro Injectable (ADMELOG) 3 Unit(s) SubCutaneous three times a day before meals  metoprolol succinate ER 25 milliGRAM(s) Oral daily  pantoprazole    Tablet 40 milliGRAM(s) Oral every 12 hours  polyethylene glycol 3350 17 Gram(s) Oral two times a day  psyllium Powder 1 Packet(s) Oral daily  sodium chloride 0.65% Nasal 1 Spray(s) Both Nostrils two times a day  tamsulosin 0.4 milliGRAM(s) Oral at bedtime  tiotropium 2.5 MICROgram(s) Inhaler 2 Puff(s) Inhalation daily    PRN Inpatient Medications  acetaminophen     Tablet .. 650 milliGRAM(s) Oral every 6 hours PRN  bisacodyl 5 milliGRAM(s) Oral every 12 hours PRN  dextrose Oral Gel 15 Gram(s) Oral once PRN  dextrose Oral Gel 15 Gram(s) Oral once PRN  melatonin 3 milliGRAM(s) Oral at bedtime PRN  ondansetron Injectable 4 milliGRAM(s) IV Push once PRN  ondansetron Injectable 4 milliGRAM(s) IV Push every 6 hours PRN  simethicone 80 milliGRAM(s) Chew every 8 hours PRN  sodium chloride 0.9% lock flush 10 milliLiter(s) IV Push every 1 hour PRN      FOCUSED REVIEW OF SYSTEMS  --------------------------------------------------------------------------------  denies CP/palpitations  denies SOB at rest  persistent LE edema      VITALS/PHYSICAL EXAM  --------------------------------------------------------------------------------  T(C): 36.7 (04-10-25 @ 11:52), Max: 36.9 (04-09-25 @ 23:56)  HR: 72 (04-10-25 @ 11:52) (65 - 72)  BP: 118/67 (04-10-25 @ 11:52) (95/60 - 118/67)  RR: 18 (04-10-25 @ 11:52) (18 - 18)  SpO2: 98% (04-10-25 @ 11:52) (95% - 100%)  Wt(kg): --        04-09-25 @ 07:01  -  04-10-25 @ 07:00  --------------------------------------------------------  IN: 0 mL / OUT: 2500 mL / NET: -2500 mL      Physical Exam:  	Gen: NAD, lying in bed  	Pulm: CTA B/L anteriorly, decreased breath sounds b/l axilla  	CV: RRR, S1S2  	Abd: +BS, soft, nontender/nondistended  	: No suprapubic tenderness.  no ro          Extremity: b/l LE pitting edema with compression wraps in place              Access: R chest tunneled catheter, no tunnel erythema/fluctuance      LABS/STUDIES  --------------------------------------------------------------------------------              11.0   14.55 >-----------<  106      [04-10-25 @ 06:38]              40.3     133  |  98  |  37  ----------------------------<  135      [04-10-25 @ 06:38]  5.1   |  19  |  3.96        Ca     8.4     [04-10-25 @ 06:38]      Mg     2.6     [04-09-25 @ 07:17]      Phos  4.9     [04-09-25 @ 07:17]              Creatinine Trend:  SCr 3.96 [04-10 @ 06:38]  SCr 5.17 [04-09 @ 07:17]  SCr 4.84 [04-08 @ 07:21]  SCr 5.77 [04-07 @ 07:08]  SCr 5.30 [04-06 @ 06:54]              Urinalysis - [04-10-25 @ 06:38]      Color  / Appearance  / SG  / pH       Gluc 135 / Ketone   / Bili  / Urobili        Blood  / Protein  / Leuk Est  / Nitrite       RBC  / WBC  / Hyaline  / Gran  / Sq Epi  / Non Sq Epi  / Bacteria       Iron 16, TIBC 243, %sat 6      [03-15-25 @ 05:59]  Ferritin 178      [01-15-25 @ 20:10]  TSH 3.44      [03-30-25 @ 11:04]  Lipid: chol 123, TG 91, HDL 33, LDL --      [08-08-24 @ 06:50]    Syphilis Screen (Treponema Pallidum Ab) Negative      [03-30-25 @ 11:04]

## 2025-04-10 NOTE — PROGRESS NOTE ADULT - ASSESSMENT
78 y/o male w/ PMHx CAD s/p CABG, HF (combined HFrEF [EF 25%] and HFpEF [G3DD]) w/ ICD, AFib on Xarelto, severe AS s/p TAVR, COPD on 3-4L home O2, CKD4, HTN, HLD, T2DM, and BPH who presents as a transfer from [facility] for HF evaluation. Ongoing CHF exacerbation now off bumex gtt, new DENISHA on CKD likely 2/2 ongoing cardiorenal and possible component of abdominal compartment syndrome given tremendous ascites, now s/p tunnelled HD catheter.  RRT/Code Stroke on 3/30/25 for lethargy and facial droop, CT negative, likely in the setting of hypercarbia, placed on bipap with improvement.

## 2025-04-10 NOTE — PROGRESS NOTE ADULT - SUBJECTIVE AND OBJECTIVE BOX
Patient is a 79y old  Male who presents with a chief complaint of HF eval (10 Apr 2025 12:41)      SUBJECTIVE / OVERNIGHT EVENTS: pt did not receive bipap last night     MEDICATIONS  (STANDING):  albuterol/ipratropium for Nebulization 3 milliLiter(s) Nebulizer every 6 hours  allopurinol 100 milliGRAM(s) Oral daily  apixaban 2.5 milliGRAM(s) Oral two times a day  aspirin enteric coated 81 milliGRAM(s) Oral daily  atorvastatin 20 milliGRAM(s) Oral at bedtime  buMETAnide Injectable 2 milliGRAM(s) IV Push every 12 hours  calcium acetate 667 milliGRAM(s) Oral three times a day with meals  chlorhexidine 2% Cloths 1 Application(s) Topical daily  chlorhexidine 4% Liquid 1 Application(s) Topical <User Schedule>  dextrose 5%. 1000 milliLiter(s) (100 mL/Hr) IV Continuous <Continuous>  dextrose 5%. 1000 milliLiter(s) (50 mL/Hr) IV Continuous <Continuous>  dextrose 50% Injectable 25 Gram(s) IV Push once  dextrose 50% Injectable 12.5 Gram(s) IV Push once  dextrose 50% Injectable 25 Gram(s) IV Push once  epoetin lashawn (EPOGEN) Injectable 70129 Unit(s) IV Push <User Schedule>  fluticasone propionate/ salmeterol 250-50 MICROgram(s) Diskus 1 Dose(s) Inhalation two times a day  folic acid 1 milliGRAM(s) Oral daily  glucagon  Injectable 1 milliGRAM(s) IntraMuscular once  insulin glargine Injectable (LANTUS) 18 Unit(s) SubCutaneous at bedtime  insulin lispro (ADMELOG) corrective regimen sliding scale   SubCutaneous three times a day before meals  insulin lispro Injectable (ADMELOG) 3 Unit(s) SubCutaneous three times a day before meals  metoprolol succinate ER 25 milliGRAM(s) Oral daily  pantoprazole    Tablet 40 milliGRAM(s) Oral every 12 hours  polyethylene glycol 3350 17 Gram(s) Oral two times a day  psyllium Powder 1 Packet(s) Oral daily  sodium chloride 0.65% Nasal 1 Spray(s) Both Nostrils two times a day  tamsulosin 0.4 milliGRAM(s) Oral at bedtime  tiotropium 2.5 MICROgram(s) Inhaler 2 Puff(s) Inhalation daily    MEDICATIONS  (PRN):  acetaminophen     Tablet .. 650 milliGRAM(s) Oral every 6 hours PRN Temp greater or equal to 38C (100.4F), Mild Pain (1 - 3)  bisacodyl 5 milliGRAM(s) Oral every 12 hours PRN Constipation  dextrose Oral Gel 15 Gram(s) Oral once PRN Blood Glucose LESS THAN 70 milliGRAM(s)/deciliter  dextrose Oral Gel 15 Gram(s) Oral once PRN Blood Glucose LESS THAN 70 milliGRAM(s)/deciliter  melatonin 3 milliGRAM(s) Oral at bedtime PRN Insomnia  ondansetron Injectable 4 milliGRAM(s) IV Push once PRN Nausea and/or Vomiting  ondansetron Injectable 4 milliGRAM(s) IV Push every 6 hours PRN Nausea and/or Vomiting  simethicone 80 milliGRAM(s) Chew every 8 hours PRN Indigestion  sodium chloride 0.9% lock flush 10 milliLiter(s) IV Push every 1 hour PRN Pre/post blood products, medications, blood draw, and to maintain line patency        CAPILLARY BLOOD GLUCOSE      POCT Blood Glucose.: 132 mg/dL (10 Apr 2025 08:50)  POCT Blood Glucose.: 335 mg/dL (09 Apr 2025 20:47)  POCT Blood Glucose.: 127 mg/dL (09 Apr 2025 17:35)    I&O's Summary    09 Apr 2025 07:01  -  10 Apr 2025 07:00  --------------------------------------------------------  IN: 0 mL / OUT: 2500 mL / NET: -2500 mL        PHYSICAL EXAM:  GENERAL: NAD, well-developed, obese   HEAD:  Atraumatic, Normocephalic  EYES:  conjunctiva and sclera clear  CHEST/LUNG: +crackels bases   HEART: Regular rate and rhythm; S1S2  ABDOMEN: Soft, Nontender, Nondistended; Bowel sounds present  EXTREMITIES:  anasarca LUE swelling >RUE  PSYCH: AAOx3  LABS:                        11.0   14.55 )-----------( 106      ( 10 Apr 2025 06:38 )             40.3     04-10    133[L]  |  98  |  37[H]  ----------------------------<  135[H]  5.1   |  19[L]  |  3.96[H]    Ca    8.4      10 Apr 2025 06:38  Phos  4.9     04-09  Mg     2.6     04-09            Urinalysis Basic - ( 10 Apr 2025 06:38 )    Color: x / Appearance: x / SG: x / pH: x  Gluc: 135 mg/dL / Ketone: x  / Bili: x / Urobili: x   Blood: x / Protein: x / Nitrite: x   Leuk Esterase: x / RBC: x / WBC x   Sq Epi: x / Non Sq Epi: x / Bacteria: x        RADIOLOGY & ADDITIONAL TESTS:    Imaging Personally Reviewed:    Consultant(s) Notes Reviewed:      Care Discussed with Consultants/Other Providers:

## 2025-04-10 NOTE — PROGRESS NOTE ADULT - PROBLEM SELECTOR PLAN 1
Patient known to Dr. Acosta, who has transitioned care of this patient to [facility] while patient remains hospitalized  newly ESRD started on HD this admission  s/p permcath 03/28  Planning for daily alternating UF/HD, for UF only today.  Full HD scheduled for tomorrow  UF as tolerated, remains volume overloaded.  Continues on IV bumex    as per chart, declined RHC/cardiomems  phos at goal, no need for binders

## 2025-04-10 NOTE — PROGRESS NOTE ADULT - PROBLEM SELECTOR PLAN 1
- Continue monitoring Cr and urine output.   - now on HD MWF  - c/w bipap qhs and prn  - s/p tunneled HD catheter on 3/28  - D/w renal 4/9 will trial daily HD for 4 days to see improvement  - If no improvement will reconsult pallative

## 2025-04-10 NOTE — PROGRESS NOTE ADULT - SUBJECTIVE AND OBJECTIVE BOX
Follow-up Pulm Progress Note    pt refused bipap last night  denies SOB/CP     Medications:  MEDICATIONS  (STANDING):  albuterol/ipratropium for Nebulization 3 milliLiter(s) Nebulizer every 6 hours  allopurinol 100 milliGRAM(s) Oral daily  apixaban 2.5 milliGRAM(s) Oral two times a day  aspirin enteric coated 81 milliGRAM(s) Oral daily  atorvastatin 20 milliGRAM(s) Oral at bedtime  buMETAnide Injectable 2 milliGRAM(s) IV Push every 12 hours  calcium acetate 667 milliGRAM(s) Oral three times a day with meals  chlorhexidine 2% Cloths 1 Application(s) Topical daily  chlorhexidine 4% Liquid 1 Application(s) Topical <User Schedule>  dextrose 5%. 1000 milliLiter(s) (50 mL/Hr) IV Continuous <Continuous>  dextrose 5%. 1000 milliLiter(s) (100 mL/Hr) IV Continuous <Continuous>  dextrose 50% Injectable 25 Gram(s) IV Push once  dextrose 50% Injectable 12.5 Gram(s) IV Push once  dextrose 50% Injectable 25 Gram(s) IV Push once  epoetin lashawn (EPOGEN) Injectable 69861 Unit(s) IV Push <User Schedule>  fluticasone propionate/ salmeterol 250-50 MICROgram(s) Diskus 1 Dose(s) Inhalation two times a day  folic acid 1 milliGRAM(s) Oral daily  glucagon  Injectable 1 milliGRAM(s) IntraMuscular once  insulin glargine Injectable (LANTUS) 18 Unit(s) SubCutaneous at bedtime  insulin lispro (ADMELOG) corrective regimen sliding scale   SubCutaneous three times a day before meals  insulin lispro Injectable (ADMELOG) 3 Unit(s) SubCutaneous three times a day before meals  metoprolol succinate ER 25 milliGRAM(s) Oral daily  pantoprazole    Tablet 40 milliGRAM(s) Oral every 12 hours  polyethylene glycol 3350 17 Gram(s) Oral two times a day  psyllium Powder 1 Packet(s) Oral daily  sodium chloride 0.65% Nasal 1 Spray(s) Both Nostrils two times a day  tamsulosin 0.4 milliGRAM(s) Oral at bedtime  tiotropium 2.5 MICROgram(s) Inhaler 2 Puff(s) Inhalation daily    MEDICATIONS  (PRN):  acetaminophen     Tablet .. 650 milliGRAM(s) Oral every 6 hours PRN Temp greater or equal to 38C (100.4F), Mild Pain (1 - 3)  bisacodyl 5 milliGRAM(s) Oral every 12 hours PRN Constipation  dextrose Oral Gel 15 Gram(s) Oral once PRN Blood Glucose LESS THAN 70 milliGRAM(s)/deciliter  dextrose Oral Gel 15 Gram(s) Oral once PRN Blood Glucose LESS THAN 70 milliGRAM(s)/deciliter  melatonin 3 milliGRAM(s) Oral at bedtime PRN Insomnia  ondansetron Injectable 4 milliGRAM(s) IV Push once PRN Nausea and/or Vomiting  ondansetron Injectable 4 milliGRAM(s) IV Push every 6 hours PRN Nausea and/or Vomiting  simethicone 80 milliGRAM(s) Chew every 8 hours PRN Indigestion  sodium chloride 0.9% lock flush 10 milliLiter(s) IV Push every 1 hour PRN Pre/post blood products, medications, blood draw, and to maintain line patency          Vital Signs Last 24 Hrs  T(C): 36.8 (10 Apr 2025 04:15), Max: 36.9 (09 Apr 2025 23:56)  T(F): 98.2 (10 Apr 2025 04:15), Max: 98.5 (09 Apr 2025 23:56)  HR: 66 (10 Apr 2025 09:27) (65 - 69)  BP: 101/63 (10 Apr 2025 04:15) (95/60 - 110/70)  BP(mean): --  RR: 18 (10 Apr 2025 04:15) (18 - 18)  SpO2: 98% (10 Apr 2025 09:27) (95% - 100%)    Parameters below as of 10 Apr 2025 04:15  Patient On (Oxygen Delivery Method): nasal cannula  O2 Flow (L/min): 3      ABG - ( 10 Apr 2025 11:20 )  pH, Arterial: 7.28  pH, Blood: x     /  pCO2: 62    /  pO2: 95    / HCO3: 29    / Base Excess: 1.3   /  SaO2: 99.3                  04-09 @ 07:01  -  04-10 @ 07:00  --------------------------------------------------------  IN: 0 mL / OUT: 2500 mL / NET: -2500 mL          LABS:                        11.0   14.55 )-----------( 106      ( 10 Apr 2025 06:38 )             40.3     04-10    133[L]  |  98  |  37[H]  ----------------------------<  135[H]  5.1   |  19[L]  |  3.96[H]    Ca    8.4      10 Apr 2025 06:38  Phos  4.9     04-09  Mg     2.6     04-09            CAPILLARY BLOOD GLUCOSE      POCT Blood Glucose.: 132 mg/dL (10 Apr 2025 08:50)      Urinalysis Basic - ( 10 Apr 2025 06:38 )    Color: x / Appearance: x / SG: x / pH: x  Gluc: 135 mg/dL / Ketone: x  / Bili: x / Urobili: x   Blood: x / Protein: x / Nitrite: x   Leuk Esterase: x / RBC: x / WBC x   Sq Epi: x / Non Sq Epi: x / Bacteria: x              Physical Examination:  PULM: Decreased BS   CVS: S1, S2 heard    RADIOLOGY REVIEWED  CXR: congestion  L effusion/atelectasis     TRANSTHORACIC ECHOCARDIOGRAM REPORT  ________________________________________________________________________________                                      _______       Pt. Name:       JO VARELA Study Date:    2/7/2025  MRN:            [id number]    YOB: 1945  Accession #:    [id number]     Age:           79 years  Account#:       [id number]  Gender:        M  Heart Rate:     63 bpm        Height:        71.00 in (180.34 cm)  Rhythm:         sinus rhythm  Weight:        242.50 lb (110.00 kg)  Blood Pressure: 106/67 mmHg   BSA/BMI:       2.29 m² / 33.82 kg/m²  ________________________________________________________________________________________  Referring Physician:    [id number] Aliyah Correa  Interpreting Physician: Jo Lin MD  Primary Sonographer:    Micah Pedraza [facility]  Fellow (Performing):    Ana M Noyola MD    CPT:                ECHO TTE WITH CON COMP W DOPP - .m;DEFINITY ECHO                      CONTRAST PER ML - .m;DEFINITY ECHO CONTRAST PER ML                      WASTED - .m  Indication(s):      Dyspnea, unspecified - R06.00  Procedure:          Transthoracic echocardiogram with 2-D, M-mode and complete                      spectral and color flow Doppler.  Ordering Location:  [facility]  Admission Status:   Inpatient  Contrast Injection: Verbal consent was obtained for injection of Ultrasonic                      Enhancing Agent following a discussion of risks and                      benefits.                      Endocardial visualization enhanced with 2 ml of Definity                      Ultrasound enhancing agent (Lot#:[id number] Exp.Date:2025-AUG                      Discarded Dose:8ml).  UEA Reaction:       Patient had no adverse reaction after injection of                      Ultrasound Enhancing Agent.  Study Information:  Image quality for this study is technically difficult.    _______________________________________________________________________________________     CONCLUSIONS:      1. Definity ultrasound enhancing agent was given for enhanced left ventricular opacification and improved delineation of the left ventricular endocardial borders.   2. Left ventricular wall thickness is normal. Left ventricular systolic function is severely decreased with an ejection fraction visually estimated at 30 %. Global left ventricular hypokinesis.   3. Multiple segmental abnormalities exist. See findings.   4. At least "moderate" mitral regurgitation.   5. 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 Trace intravalvular regurgitation.   6. Severely enlarged right ventricular cavity size and reduced right ventricular systolic function.   7. Probably severe tricuspid regurgitation.   8. Device lead is visualized in the right heart.    ________________________________________________________________________________________  FINDINGS:     Left Ventricle:  After obtaining consent, Definity ultrasound enhancing agent was given for enhanced left ventricular opacification and improved delineation of the left ventricular endocardial borders. Left ventricular wall thickness is normal. Left ventricular systolic function is severely decreased with an ejection fraction visually estimated at 30%. There is global left ventricular hypokinesis. There is no evidence of a left ventricular thrombus.  LV Wall Scoring: The entire apex is akinetic. The basal and mid anterior wall,  basal and mid anterolateral wall, basal and mid anterior septum, basal and mid  inferior septum, basal and mid inferior wall, and basal and mid inferolateral  wall are hypokinetic.          Right Ventricle:  The right ventricular cavity is severely enlarged in size and right ventricular systolic function is reduced. Tricuspid annular plane systolic excursion (TAPSE) is 1.0 cm (normal >=1.7 cm). Tricuspid annular tissue Doppler S' is 4.2 cm/s (normal >10 cm/s). A device lead is visualized in the right heart.     Left Atrium:  The left atrium is moderately dilated with an indexed volume of 43.69 ml/m².     Right Atrium:  The right atrium is severely dilated with an indexed volume of 48.50 ml/m².     Interatrial Septum:  The interatrial septum appears intact.     Aortic Valve:  A a transcatheter deployed (TAVR) is present in the aortic position. The prosthetic valve has normal function. The peak transaortic velocity is 2.00 m/s, peak transaortic gradient is 16.0 mmHg and mean transaortic gradient is 9.0 mmHg with an LVOT/aortic valve VTI ratio of 0.41. The effective orifice area is estimated at 1.99 cm² by the continuity equation. There is trace intravalvular regurgitation.     Mitral Valve:  There is at least "moderate" mitral regurgitation.     Tricuspid Valve:  There is probably severe tricuspid regurgitation. Estimated pulmonary artery systolic pressure is 52 mmHg, consistent with mild to moderate pulmonary hypertension.     Pulmonic Valve:  There is mild pulmonic regurgitation.     Aorta:  The aortic root appears normal in size.     Pericardium:  No pericardial effusion seen.     Systemic Veins:  The inferior vena cava is dilated measuring 2.35 cm in diameter, (dilated >2.1cm) with abnormal inspiratory collapse (abnormal <50%) consistent with elevated right atrial pressure (~15, range 10-20mmHg).  ____________________________________________________________________  QUANTITATIVE DATA:  Left Ventricle Measurements: (Indexed to BSA)     IVSd (2D):   1.1 cm  LVPWd (2D):  1.1 cm  LVIDd (2D):  5.3 cm  LVIDs (2D):  4.5 cm  LV Mass:     228 g  99.5 g/m²  Visualized LV EF%: 30%     MV E Vmax:    1.05 m/s  e' lateral:   4.73 cm/s  e' medial:    4.16 cm/s  E/e' lateral: 22.20  E/e' medial:  25.24  E/e' Average: 23.62    Aorta Measurements: (Normal range) (Indexed to BSA)     Ao Root d     3.60 cm (3.1 - 3.7 cm) 1.57 cm/m²  Ao Asc d, 2D: 3.60  Ao Asc prox:  3.60 cm                1.57 cm/m²  Ao Arch:      2.8 cm            Left Atrium Measurements: (Indexed to BSA)  LA Diam 2D:        5.70 cm  LA Vol s, MOD A4C: 85.80 ml.  LA Vol s, MOD A2C: 116.00 ml.  LA Vol s, MOD BP:  100.00 ml  43.69 ml/m²       Right Ventricle Measurements: Right Atrial Measurements:     TAPSE:           1.0 cm       RA Vol:            111.00 ml  RV Base (RVID1): 5.6 cm       RA Vol s, MOD A4C  111.0 ml  RV Mid (RVID2):  4.7 cm       RA Vol Index:      48.50 ml/m²                                RA Area s, MOD A4C 29.2 cm²       LVOT / RVOT/ Qp/Qs Data: (Indexed to BSA)  LVOT Diameter,s: 2.50 cm  LVOT Area:       4.91 cm²  LVOT Vmax:       0.92 m/s  LVOT Vmn:        0.622 m/s  LVOT VTI:        16.00 cm  LVOT peak grad:  3 mmHg  LVOT mean grad:  1.5 mmHg  LVOT SV:         78.5 ml   34.31 ml/m²    Aortic Valve Measurements:  AV Vmax:                2.0 m/s  AV Peak Gradient:       16.0 mmHg  AV Mean Gradient:       9.0 mmHg  AV VTI:                 39.4 cm  AV VTI Ratio:           0.41  AoV EOA, Contin:        1.99 cm²  AoV EOA, Contin i:      0.87 cm²/m²  AoV Dimensionless Index 0.41    Mitral Valve Measurements:     MV E Vmax: 1.0 m/s       Tricuspid Valve Measurements:     TV S'             4.2 cm/s  TR Vmax:          3.0 m/s  TR Peak Gradient: 36.7 mmHg  RA Pressure:      15 mmHg  PASP:             52 mmHg

## 2025-04-10 NOTE — PROGRESS NOTE ADULT - PROBLEM SELECTOR PLAN 1
2nd to underlying COPD. Exacerbated in the setting of volume overload   -CXR with congestion, L effusion/atelectasis  -Keep O>I with HD, diuresis as tolerated  -Continue bronchodilators  -ABGs reviewed. Pt w/ hx EMBER on CPAP at home, failed CPAP this admission and now escalated to Bipap   -Bipap not worn overnight 4/2-4/3, CO2 84 in AM on 4/3. CO2 lowered by 7 points to 77 after several hours on bipap and then lowered further to 64 later in the day after settings adjusted.   -ABG 4/5 acceptable, reflective of Bipap 18/7/30%. Continue Bipap qHS and PRN daytime   -Bipap not worn last night, AM ABG noted, suggest pt wear bipap for a few hours this afternoon. Educated on compliance.   -Repeat ABG tomorrow AM   -Keep sats >90% with O2  -EMBER and CPAP therapy have been considered and ruled out as EMBER is not the predominant cause of his pulmonary limitations, nor his daytime hypercapnia nor his nocturnal hypercapnia. Pt will need Bipap on discharge.

## 2025-04-10 NOTE — PROGRESS NOTE ADULT - ASSESSMENT
CVTS Office Progress Note     12/19/2022    Taylor Le  1979    Chief Complaint:    Chief Complaint   Patient presents with   • Lymphoma       HPI:      PCP:  Rachel Sue APRN  Podiatry: Dr. Ceballos, also sees wound care    43 y.o. male with HTN(stable, increased risk stroke, rupture), Hyperlipidemia(stable, increased risk cardiovascular events), Diabetes Mellitus(stable, increased risk cardiovascular events) and Obesity(uncontrolled, increased risk cardiovascular events) lymphedema new, neuropathy, retinopathy, cataract surgery, appendectomy.  never smoked.  Foot fracture right in Ortho boot.  Referred to vascular surgery for bilateral lower extremity edema, possible lymphedema.  Swelling has started over the last 6 to months to a year with progression in severity, was referred to wound care in the process of evaluation for leg pumps, bilateral venous negative for reflux or obstruction.  No other associated signs, symptoms or modifying factors.    11/2022 bilateral venous: Negative for DVT or reflux    The following portions of the patient's history were reviewed and updated as appropriate: allergies, current medications, past family history, past medical history, past social history, past surgical history and problem list.  Recent images independently reviewed.  Available laboratory values reviewed.    PMH:  Past Medical History:   Diagnosis Date   • Diabetes mellitus (HCC)      Past Surgical History:   Procedure Laterality Date   • APPENDECTOMY     • EYE SURGERY       Family History   Problem Relation Age of Onset   • Heart disease Mother    • Diabetes Mother    • Diabetes Father    • Diabetes Sister    • Hypertension Sister    • Diabetes Maternal Grandmother    • Heart disease Maternal Grandfather    • Diabetes Paternal Grandmother    • Cancer Paternal Grandfather      Social History     Tobacco Use   • Smoking status: Never   • Smokeless tobacco: Never   Vaping Use   • Vaping Use: Never used    Substance Use Topics   • Alcohol use: Never   • Drug use: Never       ALLERGIES:  Allergies   Allergen Reactions   • Phenobarbital Seizure         MEDICATIONS:    Current Outpatient Medications:   •  ascorbic acid (VITAMIN C) 500 MG tablet, Take 500 mg by mouth Daily., Disp: , Rfl:   •  aspirin 81 MG chewable tablet, Chew 81 mg Daily., Disp: , Rfl:   •  Continuous Blood Gluc  (Dexcom G6 ) device, 1 each 1 (One) Time As Needed (one device a year) for up to 1 dose., Disp: 1 each, Rfl: 0  •  Continuous Blood Gluc  (FreeStyle Sander 14 Day Mobile) device, 1 Device As Needed (Check blood sugar 4 times a day and  PRN)., Disp: 1 each, Rfl: 0  •  Continuous Blood Gluc Sensor (Dexcom G6 Sensor), Every 10 (Ten) Days., Disp: 3 each, Rfl: 3  •  Continuous Blood Gluc Sensor (FreeStyle Sander 14 Day Sensor) misc, 1 each Every 14 (Fourteen) Days., Disp: 3 each, Rfl: 11  •  Continuous Blood Gluc Transmit (Dexcom G6 Transmitter) misc, 1 each Every 3 (Three) Months., Disp: 1 each, Rfl: 3  •  furosemide (Lasix) 20 MG tablet, Take 1 tablet by mouth 2 (Two) Times a Day., Disp: 60 tablet, Rfl: 3  •  Glucagon (Gvoke HypoPen 2-Pack) 1 MG/0.2ML solution auto-injector, Inject 1 mg under the skin into the appropriate area as directed As Needed (for hypoglycemia)., Disp: 0.4 mL, Rfl: 1  •  Insulin Aspart (novoLOG) 100 UNIT/ML injection, 100 units per day via insulin pump, Disp: 30 mL, Rfl: 6  •  Insulin Glargine (LANTUS SOLOSTAR) 100 UNIT/ML injection pen, Inject 30 Units under the skin into the appropriate area as directed 2 (Two) Times a Day., Disp: 10 pen, Rfl: 11  •  lisinopril-hydrochlorothiazide (PRINZIDE,ZESTORETIC) 20-25 MG per tablet, Take 1 tablet by mouth Daily., Disp: 30 tablet, Rfl: 11  •  rosuvastatin (Crestor) 10 MG tablet, Take 1 tablet by mouth Every Night., Disp: 30 tablet, Rfl: 11      Review of Systems   Constitutional: Negative for chills, decreased appetite, fever and weight loss.   HENT: Negative  "for congestion, nosebleeds and sore throat.    Eyes: Negative for blurred vision, visual disturbance and visual halos.   Cardiovascular: Positive for leg swelling. Negative for chest pain and dyspnea on exertion.   Respiratory: Negative for cough, shortness of breath, sputum production and wheezing.    Endocrine: Negative for cold intolerance and polyuria.   Hematologic/Lymphatic: Negative for bleeding problem. Does not bruise/bleed easily.   Skin: Positive for unusual hair distribution. Negative for flushing and nail changes.   Musculoskeletal: Positive for arthritis, back pain and joint pain.   Gastrointestinal: Negative for bloating, abdominal pain, hematemesis, melena, nausea and vomiting.   Genitourinary: Negative for flank pain and hematuria.   Neurological: Negative for brief paralysis, difficulty with concentration, focal weakness, light-headedness, loss of balance, numbness, paresthesias and weakness.        Foot neuropathy   Psychiatric/Behavioral: Negative for altered mental status, depression, substance abuse and suicidal ideas.   Allergic/Immunologic: Negative for hives and persistent infections.         Vitals:    12/19/22 0818   BP: 144/80   BP Location: Left arm   Pulse: 89   SpO2: 98%   Weight: 133 kg (293 lb)   Height: 182.9 cm (72\")     Physical Exam  Vitals and nursing note reviewed.   Constitutional:       Appearance: He is obese.   HENT:      Head: Normocephalic.      Nose: Nose normal.      Mouth/Throat:      Mouth: Mucous membranes are moist.   Eyes:      Extraocular Movements: Extraocular movements intact.      Pupils: Pupils are equal, round, and reactive to light.   Cardiovascular:      Rate and Rhythm: Normal rate.      Pulses: Normal pulses.   Pulmonary:      Effort: Pulmonary effort is normal.   Abdominal:      General: Abdomen is flat.      Palpations: Abdomen is soft.   Musculoskeletal:      Cervical back: Normal range of motion.      Right lower leg: Edema present.      Left lower " leg: Edema present.   Skin:     General: Skin is warm and dry.      Capillary Refill: Capillary refill takes 2 to 3 seconds.      Comments: And bilateral Unna boots, also has foot fracture with Ortho boot right foot   Neurological:      General: No focal deficit present.      Mental Status: He is alert and oriented to person, place, and time.   Psychiatric:         Mood and Affect: Mood normal.         Assessment & Plan     Independent Review of Studies    1. Orthopnea  Obtain Echo    - Adult Transthoracic Echo Complete W/ Cont if Necessary Per Protocol; Future    2. Mixed hyperlipidemia  Lipid-lowering therapy has been proven beneficial in patients with cardio-vascular disease. Current guidelines recommend statin treatment for all patients with PAD,CAD and carotid stenosis. Statins are beneficial in preventing cardiovascular events, increasing functional capacity and lower the risk of adverse limb loss in PAD. Statins decrease the progression of plaque formation and may improve peripheral vessel lining, and aid in reversing atherosclerosis.    3. Diabetes mellitus type 2, with complication, on long term insulin pump (HCC)  Lab Results   Component Value Date    HGBA1C 6.90 (H) 11/02/2022     Continue to follow-up with endocrine, consistent carbohydrate diet, encouraged exercise    4. Class II obesity  Body mass index is 39.74 kg/m². Class 2 obesity, We have discussed the benefits of weight loss as it relates to long term morbidity and disease prevention.  Interventions discussed included self-monitoring of caloric intake, increasing physical activity/exercise, goal setting, stimulus control, consultation with dietitian, and non-food rewards.      5. Lymphedema  Recommend continued use of compression stockings 20-30mmHg daily, may remove at night.  Advised elevation of legs while at rest.  Encouraged daily exercise.    Recommend high protein, low sodium diet <2grams per day    Can try selenium supplementation if  desired, discussed with patient that potential benefits of selenium supplementation for improved lymphatic drainage remain controversial    Etiology of leg swelling is unusual, no apparent reflux.  As elevation in liver function testing as well as low sodium and low chloride this can often be seen in right heart dysfunction or other congestive/hypervolemic states, obtain echo.      Detailed discussion regarding risks, benefits, and treatment plan. Images independently reviewed. Patient understands, agrees, and wishes to proceed with plan.       This document has been electronically signed by CHARLES Marin-BC @  On December 19, 2022 09:45 CST           78 y/o M with PMH of CAD s/p CABG, HF (combined HFrEF [EF 25%] and HFpEF [G3DD]) w/ ICD, AFib on Xarelto, severe AS s/p TAVR, COPD on 3-4L home O2, CKD3, HTN, HLD, T2DM, and BPH who presents as a transfer from [facility] for HF evaluation. He presented to [place] on 1/10/25 for worsening of his chronic SOB for 2 weeks, with associated increased LE edema and abdominal distention - admitted for acute on chronic hypoxic respiratory failure, treated with IV diuresis & Bipap. Tx to [facility] for further evaluation by HF team. Prolonged hospital course for CHF exacerbation, DENISHA on CKD now requiring HD, RRT/Code stroke 3/30 for lethargy and facial droop, CT head negative. Pulmonary called to consult for acute on chronic hypoxic/hypercapnic respiratory failure requiring Bipap.

## 2025-04-11 LAB
ANION GAP SERPL CALC-SCNC: 15 MMOL/L — SIGNIFICANT CHANGE UP (ref 5–17)
BUN SERPL-MCNC: 43 MG/DL — HIGH (ref 7–23)
CALCIUM SERPL-MCNC: 8.7 MG/DL — SIGNIFICANT CHANGE UP (ref 8.4–10.5)
CHLORIDE SERPL-SCNC: 98 MMOL/L — SIGNIFICANT CHANGE UP (ref 96–108)
CO2 SERPL-SCNC: 24 MMOL/L — SIGNIFICANT CHANGE UP (ref 22–31)
CREAT SERPL-MCNC: 4.86 MG/DL — HIGH (ref 0.5–1.3)
EGFR: 11 ML/MIN/1.73M2 — LOW
EGFR: 11 ML/MIN/1.73M2 — LOW
GAS PNL BLDA: SIGNIFICANT CHANGE UP
GLUCOSE BLDC GLUCOMTR-MCNC: 127 MG/DL — HIGH (ref 70–99)
GLUCOSE BLDC GLUCOMTR-MCNC: 136 MG/DL — HIGH (ref 70–99)
GLUCOSE BLDC GLUCOMTR-MCNC: 169 MG/DL — HIGH (ref 70–99)
GLUCOSE SERPL-MCNC: 77 MG/DL — SIGNIFICANT CHANGE UP (ref 70–99)
HCT VFR BLD CALC: 37.7 % — LOW (ref 39–50)
HGB BLD-MCNC: 10.4 G/DL — LOW (ref 13–17)
MCHC RBC-ENTMCNC: 25.2 PG — LOW (ref 27–34)
MCHC RBC-ENTMCNC: 27.6 G/DL — LOW (ref 32–36)
MCV RBC AUTO: 91.3 FL — SIGNIFICANT CHANGE UP (ref 80–100)
NRBC BLD AUTO-RTO: 0 /100 WBCS — SIGNIFICANT CHANGE UP (ref 0–0)
PLATELET # BLD AUTO: 151 K/UL — SIGNIFICANT CHANGE UP (ref 150–400)
POTASSIUM SERPL-MCNC: 4.6 MMOL/L — SIGNIFICANT CHANGE UP (ref 3.5–5.3)
POTASSIUM SERPL-SCNC: 4.6 MMOL/L — SIGNIFICANT CHANGE UP (ref 3.5–5.3)
RBC # BLD: 4.13 M/UL — LOW (ref 4.2–5.8)
RBC # FLD: 19.5 % — HIGH (ref 10.3–14.5)
SODIUM SERPL-SCNC: 137 MMOL/L — SIGNIFICANT CHANGE UP (ref 135–145)
WBC # BLD: 13.93 K/UL — HIGH (ref 3.8–10.5)
WBC # FLD AUTO: 13.93 K/UL — HIGH (ref 3.8–10.5)

## 2025-04-11 PROCEDURE — 99232 SBSQ HOSP IP/OBS MODERATE 35: CPT

## 2025-04-11 PROCEDURE — 99233 SBSQ HOSP IP/OBS HIGH 50: CPT

## 2025-04-11 RX ADMIN — IPRATROPIUM BROMIDE AND ALBUTEROL SULFATE 3 MILLILITER(S): .5; 2.5 SOLUTION RESPIRATORY (INHALATION) at 18:17

## 2025-04-11 RX ADMIN — INSULIN GLARGINE-YFGN 18 UNIT(S): 100 INJECTION, SOLUTION SUBCUTANEOUS at 22:04

## 2025-04-11 RX ADMIN — Medication 1 DOSE(S): at 05:39

## 2025-04-11 RX ADMIN — INSULIN LISPRO 3 UNIT(S): 100 INJECTION, SOLUTION INTRAVENOUS; SUBCUTANEOUS at 18:24

## 2025-04-11 RX ADMIN — ATORVASTATIN CALCIUM 20 MILLIGRAM(S): 80 TABLET, FILM COATED ORAL at 21:23

## 2025-04-11 RX ADMIN — Medication 667 MILLIGRAM(S): at 18:18

## 2025-04-11 RX ADMIN — IPRATROPIUM BROMIDE AND ALBUTEROL SULFATE 3 MILLILITER(S): .5; 2.5 SOLUTION RESPIRATORY (INHALATION) at 05:39

## 2025-04-11 RX ADMIN — Medication 81 MILLIGRAM(S): at 18:18

## 2025-04-11 RX ADMIN — Medication 40 MILLIGRAM(S): at 18:22

## 2025-04-11 RX ADMIN — TIOTROPIUM BROMIDE INHALATION SPRAY 2 PUFF(S): 3.12 SPRAY, METERED RESPIRATORY (INHALATION) at 18:24

## 2025-04-11 RX ADMIN — INSULIN LISPRO 2: 100 INJECTION, SOLUTION INTRAVENOUS; SUBCUTANEOUS at 08:57

## 2025-04-11 RX ADMIN — FOLIC ACID 1 MILLIGRAM(S): 1 TABLET ORAL at 18:19

## 2025-04-11 RX ADMIN — APIXABAN 2.5 MILLIGRAM(S): 2.5 TABLET, FILM COATED ORAL at 06:20

## 2025-04-11 RX ADMIN — Medication 1 APPLICATION(S): at 05:39

## 2025-04-11 RX ADMIN — BUMETANIDE 2 MILLIGRAM(S): 1 TABLET ORAL at 08:57

## 2025-04-11 RX ADMIN — Medication 1 SPRAY(S): at 05:39

## 2025-04-11 RX ADMIN — APIXABAN 2.5 MILLIGRAM(S): 2.5 TABLET, FILM COATED ORAL at 18:22

## 2025-04-11 RX ADMIN — Medication 1 DOSE(S): at 18:21

## 2025-04-11 RX ADMIN — POLYETHYLENE GLYCOL 3350 17 GRAM(S): 17 POWDER, FOR SOLUTION ORAL at 05:39

## 2025-04-11 RX ADMIN — INSULIN LISPRO 3 UNIT(S): 100 INJECTION, SOLUTION INTRAVENOUS; SUBCUTANEOUS at 08:58

## 2025-04-11 RX ADMIN — Medication 1 APPLICATION(S): at 12:00

## 2025-04-11 RX ADMIN — Medication 100 MILLIGRAM(S): at 18:19

## 2025-04-11 RX ADMIN — Medication 667 MILLIGRAM(S): at 08:58

## 2025-04-11 RX ADMIN — EPOETIN ALFA 10000 UNIT(S): 10000 SOLUTION INTRAVENOUS; SUBCUTANEOUS at 12:57

## 2025-04-11 RX ADMIN — Medication 1 SPRAY(S): at 18:21

## 2025-04-11 RX ADMIN — Medication 40 MILLIGRAM(S): at 06:20

## 2025-04-11 NOTE — PROGRESS NOTE ADULT - ASSESSMENT
78 y/o male w/ PMHx CAD s/p CABG, HF (combined HFrEF [EF 25%] and HFpEF [G3DD]) w/ ICD, AFib on Xarelto, severe AS s/p TAVR, COPD on 3-4L home O2, CKD4, HTN, HLD, T2DM, and BPH who presents as a transfer from [facility] for HF evaluation. Ongoing CHF exacerbation now off bumex gtt, new DENISHA on CKD likely 2/2 ongoing cardiorenal and possible component of abdominal compartment syndrome given tremendous ascites, now s/p tunnelled HD catheter.  RRT/Code Stroke on 3/30/25 for lethargy and facial droop, CT negative, likely in the setting of hypercarbia, placed on bipap with improvement.

## 2025-04-11 NOTE — PROGRESS NOTE ADULT - SUBJECTIVE AND OBJECTIVE BOX
Patient is a 79y old  Male who presents with a chief complaint of HF eval (11 Apr 2025 13:07)      SUBJECTIVE / OVERNIGHT EVENTS: pt feels better, denies pain     MEDICATIONS  (STANDING):  albuterol/ipratropium for Nebulization 3 milliLiter(s) Nebulizer every 6 hours  allopurinol 100 milliGRAM(s) Oral daily  apixaban 2.5 milliGRAM(s) Oral two times a day  aspirin enteric coated 81 milliGRAM(s) Oral daily  atorvastatin 20 milliGRAM(s) Oral at bedtime  buMETAnide Injectable 2 milliGRAM(s) IV Push every 12 hours  calcium acetate 667 milliGRAM(s) Oral three times a day with meals  chlorhexidine 2% Cloths 1 Application(s) Topical daily  chlorhexidine 4% Liquid 1 Application(s) Topical <User Schedule>  dextrose 5%. 1000 milliLiter(s) (50 mL/Hr) IV Continuous <Continuous>  dextrose 5%. 1000 milliLiter(s) (100 mL/Hr) IV Continuous <Continuous>  dextrose 50% Injectable 25 Gram(s) IV Push once  dextrose 50% Injectable 12.5 Gram(s) IV Push once  dextrose 50% Injectable 25 Gram(s) IV Push once  epoetin lashawn (EPOGEN) Injectable 27825 Unit(s) IV Push <User Schedule>  fluticasone propionate/ salmeterol 250-50 MICROgram(s) Diskus 1 Dose(s) Inhalation two times a day  folic acid 1 milliGRAM(s) Oral daily  glucagon  Injectable 1 milliGRAM(s) IntraMuscular once  insulin glargine Injectable (LANTUS) 18 Unit(s) SubCutaneous at bedtime  insulin lispro (ADMELOG) corrective regimen sliding scale   SubCutaneous three times a day before meals  insulin lispro Injectable (ADMELOG) 3 Unit(s) SubCutaneous three times a day before meals  metoprolol succinate ER 25 milliGRAM(s) Oral daily  pantoprazole    Tablet 40 milliGRAM(s) Oral every 12 hours  polyethylene glycol 3350 17 Gram(s) Oral two times a day  psyllium Powder 1 Packet(s) Oral daily  sodium chloride 0.65% Nasal 1 Spray(s) Both Nostrils two times a day  tamsulosin 0.4 milliGRAM(s) Oral at bedtime  tiotropium 2.5 MICROgram(s) Inhaler 2 Puff(s) Inhalation daily    MEDICATIONS  (PRN):  acetaminophen     Tablet .. 650 milliGRAM(s) Oral every 6 hours PRN Temp greater or equal to 38C (100.4F), Mild Pain (1 - 3)  bisacodyl 5 milliGRAM(s) Oral every 12 hours PRN Constipation  dextrose Oral Gel 15 Gram(s) Oral once PRN Blood Glucose LESS THAN 70 milliGRAM(s)/deciliter  dextrose Oral Gel 15 Gram(s) Oral once PRN Blood Glucose LESS THAN 70 milliGRAM(s)/deciliter  melatonin 3 milliGRAM(s) Oral at bedtime PRN Insomnia  ondansetron Injectable 4 milliGRAM(s) IV Push once PRN Nausea and/or Vomiting  ondansetron Injectable 4 milliGRAM(s) IV Push every 6 hours PRN Nausea and/or Vomiting  simethicone 80 milliGRAM(s) Chew every 8 hours PRN Indigestion  sodium chloride 0.9% lock flush 10 milliLiter(s) IV Push every 1 hour PRN Pre/post blood products, medications, blood draw, and to maintain line patency        CAPILLARY BLOOD GLUCOSE      POCT Blood Glucose.: 169 mg/dL (11 Apr 2025 08:33)  POCT Blood Glucose.: 138 mg/dL (10 Apr 2025 21:32)  POCT Blood Glucose.: 101 mg/dL (10 Apr 2025 17:33)    I&O's Summary    10 Apr 2025 07:01  -  11 Apr 2025 07:00  --------------------------------------------------------  IN: 300 mL / OUT: 2000 mL / NET: -1700 mL        PHYSICAL EXAM:  GENERAL: NAD, well-developed, obese   HEAD:  Atraumatic, Normocephalic  EYES:  conjunctiva and sclera clear  CHEST/LUNG: +crackels bases   HEART: Regular rate and rhythm; S1S2  ABDOMEN: Soft, Nontender, Nondistended; Bowel sounds present  EXTREMITIES:  anasarca LUE swelling >RUE  PSYCH: AAOx3    LABS:                        10.4   13.93 )-----------( 151      ( 11 Apr 2025 06:33 )             37.7     04-11    137  |  98  |  43[H]  ----------------------------<  77  4.6   |  24  |  4.86[H]    Ca    8.7      11 Apr 2025 06:32            Urinalysis Basic - ( 11 Apr 2025 06:32 )    Color: x / Appearance: x / SG: x / pH: x  Gluc: 77 mg/dL / Ketone: x  / Bili: x / Urobili: x   Blood: x / Protein: x / Nitrite: x   Leuk Esterase: x / RBC: x / WBC x   Sq Epi: x / Non Sq Epi: x / Bacteria: x        RADIOLOGY & ADDITIONAL TESTS:    Imaging Personally Reviewed:    Consultant(s) Notes Reviewed:      Care Discussed with Consultants/Other Providers:

## 2025-04-11 NOTE — PROGRESS NOTE ADULT - PROBLEM SELECTOR PLAN 1
2nd to underlying COPD. Exacerbated in the setting of volume overload   -CXR with congestion, L effusion/atelectasis  -Keep O>I with HD, diuresis as tolerated  -Continue bronchodilators  -ABGs reviewed. Pt w/ hx EMBER on CPAP at home, failed CPAP this admission and now escalated to Bipap   -Bipap not worn overnight 4/2-4/3, CO2 84 in AM on 4/3. CO2 lowered by 7 points to 77 after several hours on bipap and then lowered further to 64 later in the day after settings adjusted.   -ABG 4/5 acceptable, reflective of Bipap 18/7/30%. Continue Bipap qHS and PRN daytime   -EMBER and CPAP therapy have been considered and ruled out as EMBER is not the predominant cause of his pulmonary limitations, nor his daytime hypercapnia nor his nocturnal hypercapnia. Pt will need Bipap on discharge.  -Bipap worn overnight, AM ABG improving. Pt educated on compliance qHS.   -Keep sats >90% with O2

## 2025-04-11 NOTE — PROGRESS NOTE ADULT - ASSESSMENT
78 y/o M with PMH of CAD s/p CABG, HF (combined HFrEF [EF 25%] and HFpEF [G3DD]) w/ ICD, AFib on Xarelto, severe AS s/p TAVR, COPD on 3-4L home O2, CKD3, HTN, HLD, T2DM, and BPH who presents as a transfer from [facility] for HF evaluation. He presented to [facility] on 1/10/25 for worsening of his chronic SOB for 2 weeks, with associated increased LE edema and abdominal distention - admitted for acute on chronic hypoxic respiratory failure, treated with IV diuresis & Bipap. Tx to [facility] for further evaluation by HF team. Prolonged hospital course for CHF exacerbation, DENISHA on CKD now requiring HD, RRT/Code stroke 3/30 for lethargy and facial droop, CT head negative. Pulmonary called to consult for acute on chronic hypoxic/hypercapnic respiratory failure requiring Bipap.

## 2025-04-11 NOTE — PROGRESS NOTE ADULT - PROBLEM SELECTOR PLAN 1
Patient known to Dr. Acosta, who has transitioned care of this patient to [facility] while patient remains hospitalized  newly ESRD started on HD this admission  s/p permcath 03/28  Planning for daily alternating UF/HD, for full HD today 04/11.  PUF ordered for tomorrow 04/12, with next full HD scheduled for Monday 04/14.  All orders placed in [place]  UF as tolerated, remains volume overloaded.  Continues on IV bumex    as per chart, declined RHC/cardiomems  phos at goal, no need for binders

## 2025-04-11 NOTE — PROGRESS NOTE ADULT - SUBJECTIVE AND OBJECTIVE BOX
Follow-up Pulm Progress Note    bipap worn overnight  denies SOB/CP     Medications:  MEDICATIONS  (STANDING):  albuterol/ipratropium for Nebulization 3 milliLiter(s) Nebulizer every 6 hours  allopurinol 100 milliGRAM(s) Oral daily  apixaban 2.5 milliGRAM(s) Oral two times a day  aspirin enteric coated 81 milliGRAM(s) Oral daily  atorvastatin 20 milliGRAM(s) Oral at bedtime  buMETAnide Injectable 2 milliGRAM(s) IV Push every 12 hours  calcium acetate 667 milliGRAM(s) Oral three times a day with meals  chlorhexidine 2% Cloths 1 Application(s) Topical daily  chlorhexidine 4% Liquid 1 Application(s) Topical <User Schedule>  dextrose 5%. 1000 milliLiter(s) (50 mL/Hr) IV Continuous <Continuous>  dextrose 5%. 1000 milliLiter(s) (100 mL/Hr) IV Continuous <Continuous>  dextrose 50% Injectable 25 Gram(s) IV Push once  dextrose 50% Injectable 12.5 Gram(s) IV Push once  dextrose 50% Injectable 25 Gram(s) IV Push once  epoetin lashawn (EPOGEN) Injectable 04909 Unit(s) IV Push <User Schedule>  fluticasone propionate/ salmeterol 250-50 MICROgram(s) Diskus 1 Dose(s) Inhalation two times a day  folic acid 1 milliGRAM(s) Oral daily  glucagon  Injectable 1 milliGRAM(s) IntraMuscular once  insulin glargine Injectable (LANTUS) 18 Unit(s) SubCutaneous at bedtime  insulin lispro (ADMELOG) corrective regimen sliding scale   SubCutaneous three times a day before meals  insulin lispro Injectable (ADMELOG) 3 Unit(s) SubCutaneous three times a day before meals  metoprolol succinate ER 25 milliGRAM(s) Oral daily  pantoprazole    Tablet 40 milliGRAM(s) Oral every 12 hours  polyethylene glycol 3350 17 Gram(s) Oral two times a day  psyllium Powder 1 Packet(s) Oral daily  sodium chloride 0.65% Nasal 1 Spray(s) Both Nostrils two times a day  tamsulosin 0.4 milliGRAM(s) Oral at bedtime  tiotropium 2.5 MICROgram(s) Inhaler 2 Puff(s) Inhalation daily    MEDICATIONS  (PRN):  acetaminophen     Tablet .. 650 milliGRAM(s) Oral every 6 hours PRN Temp greater or equal to 38C (100.4F), Mild Pain (1 - 3)  bisacodyl 5 milliGRAM(s) Oral every 12 hours PRN Constipation  dextrose Oral Gel 15 Gram(s) Oral once PRN Blood Glucose LESS THAN 70 milliGRAM(s)/deciliter  dextrose Oral Gel 15 Gram(s) Oral once PRN Blood Glucose LESS THAN 70 milliGRAM(s)/deciliter  melatonin 3 milliGRAM(s) Oral at bedtime PRN Insomnia  ondansetron Injectable 4 milliGRAM(s) IV Push once PRN Nausea and/or Vomiting  ondansetron Injectable 4 milliGRAM(s) IV Push every 6 hours PRN Nausea and/or Vomiting  simethicone 80 milliGRAM(s) Chew every 8 hours PRN Indigestion  sodium chloride 0.9% lock flush 10 milliLiter(s) IV Push every 1 hour PRN Pre/post blood products, medications, blood draw, and to maintain line patency          Vital Signs Last 24 Hrs  T(C): 36.7 (11 Apr 2025 06:12), Max: 37.1 (10 Apr 2025 20:04)  T(F): 98 (11 Apr 2025 06:12), Max: 98.7 (10 Apr 2025 20:04)  HR: 76 (11 Apr 2025 10:03) (68 - 78)  BP: 98/61 (11 Apr 2025 06:12) (98/61 - 104/66)  BP(mean): --  RR: 18 (11 Apr 2025 06:12) (18 - 19)  SpO2: 99% (11 Apr 2025 10:03) (96% - 99%)    Parameters below as of 11 Apr 2025 06:12  Patient On (Oxygen Delivery Method): nasal cannula  O2 Flow (L/min): 3      ABG - ( 11 Apr 2025 05:36 )  pH, Arterial: 7.34  pH, Blood: x     /  pCO2: 53    /  pO2: 120   / HCO3: 29    / Base Excess: 2.0   /  SaO2: 99.6                  04-10 @ 07:01  -  04-11 @ 07:00  --------------------------------------------------------  IN: 300 mL / OUT: 2000 mL / NET: -1700 mL          LABS:                        10.4   13.93 )-----------( 151      ( 11 Apr 2025 06:33 )             37.7     04-11    137  |  98  |  43[H]  ----------------------------<  77  4.6   |  24  |  4.86[H]    Ca    8.7      11 Apr 2025 06:32            CAPILLARY BLOOD GLUCOSE      POCT Blood Glucose.: 169 mg/dL (11 Apr 2025 08:33)      Urinalysis Basic - ( 11 Apr 2025 06:32 )    Color: x / Appearance: x / SG: x / pH: x  Gluc: 77 mg/dL / Ketone: x  / Bili: x / Urobili: x   Blood: x / Protein: x / Nitrite: x   Leuk Esterase: x / RBC: x / WBC x   Sq Epi: x / Non Sq Epi: x / Bacteria: x                Physical Examination:  PULM: Decreased BS   CVS: S1, S2 heard    RADIOLOGY REVIEWED  CXR: congestion  L effusion/atelectasis           TRANSTHORACIC ECHOCARDIOGRAM REPORT  ________________________________________________________________________________                                      _______       Pt. Name:       JO VARELA Study Date:    2/7/2025  MRN:            [id number]    YOB: 1945  Accession #:    [id number]     Age:           79 years  Account#:       [id number]  Gender:        M  Heart Rate:     63 bpm        Height:        71.00 in (180.34 cm)  Rhythm:         sinus rhythm  Weight:        242.50 lb (110.00 kg)  Blood Pressure: 106/67 mmHg   BSA/BMI:       2.29 m² / 33.82 kg/m²  ________________________________________________________________________________________  Referring Physician:    [id number] Aliyah Correa  Interpreting Physician: Jo Lin MD  Primary Sonographer:    Micah Pedraza [facility]  Fellow (Performing):    Ana M Noyola MD    CPT:                ECHO TTE WITH CON COMP W DOPP - .m;DEFINITY ECHO                      CONTRAST PER ML - .m;DEFINITY ECHO CONTRAST PER ML                      WASTED - .m  Indication(s):      Dyspnea, unspecified - R06.00  Procedure:          Transthoracic echocardiogram with 2-D, M-mode and complete                      spectral and color flow Doppler.  Ordering Location:  [facility]  Admission Status:   Inpatient  Contrast Injection: Verbal consent was obtained for injection of Ultrasonic                      Enhancing Agent following a discussion of risks and                      benefits.                      Endocardial visualization enhanced with 2 ml of Definity                      Ultrasound enhancing agent (Lot#:[id number] Exp.Date:2025-AUG                      Discarded Dose:8ml).  UEA Reaction:       Patient had no adverse reaction after injection of                      Ultrasound Enhancing Agent.  Study Information:  Image quality for this study is technically difficult.    _______________________________________________________________________________________     CONCLUSIONS:      1. Definity ultrasound enhancing agent was given for enhanced left ventricular opacification and improved delineation of the left ventricular endocardial borders.   2. Left ventricular wall thickness is normal. Left ventricular systolic function is severely decreased with an ejection fraction visually estimated at 30 %. Global left ventricular hypokinesis.   3. Multiple segmental abnormalities exist. See findings.   4. At least "moderate" mitral regurgitation.   5. 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 Trace intravalvular regurgitation.   6. Severely enlarged right ventricular cavity size and reduced right ventricular systolic function.   7. Probably severe tricuspid regurgitation.   8. Device lead is visualized in the right heart.    ________________________________________________________________________________________  FINDINGS:     Left Ventricle:  After obtaining consent, Definity ultrasound enhancing agent was given for enhanced left ventricular opacification and improved delineation of the left ventricular endocardial borders. Left ventricular wall thickness is normal. Left ventricular systolic function is severely decreased with an ejection fraction visually estimated at 30%. There is global left ventricular hypokinesis. There is no evidence of a left ventricular thrombus.  LV Wall Scoring: The entire apex is akinetic. The basal and mid anterior wall,  basal and mid anterolateral wall, basal and mid anterior septum, basal and mid  inferior septum, basal and mid inferior wall, and basal and mid inferolateral  wall are hypokinetic.          Right Ventricle:  The right ventricular cavity is severely enlarged in size and right ventricular systolic function is reduced. Tricuspid annular plane systolic excursion (TAPSE) is 1.0 cm (normal >=1.7 cm). Tricuspid annular tissue Doppler S' is 4.2 cm/s (normal >10 cm/s). A device lead is visualized in the right heart.     Left Atrium:  The left atrium is moderately dilated with an indexed volume of 43.69 ml/m².     Right Atrium:  The right atrium is severely dilated with an indexed volume of 48.50 ml/m².     Interatrial Septum:  The interatrial septum appears intact.     Aortic Valve:  A a transcatheter deployed (TAVR) is present in the aortic position. The prosthetic valve has normal function. The peak transaortic velocity is 2.00 m/s, peak transaortic gradient is 16.0 mmHg and mean transaortic gradient is 9.0 mmHg with an LVOT/aortic valve VTI ratio of 0.41. The effective orifice area is estimated at 1.99 cm² by the continuity equation. There is trace intravalvular regurgitation.     Mitral Valve:  There is at least "moderate" mitral regurgitation.     Tricuspid Valve:  There is probably severe tricuspid regurgitation. Estimated pulmonary artery systolic pressure is 52 mmHg, consistent with mild to moderate pulmonary hypertension.     Pulmonic Valve:  There is mild pulmonic regurgitation.     Aorta:  The aortic root appears normal in size.     Pericardium:  No pericardial effusion seen.     Systemic Veins:  The inferior vena cava is dilated measuring 2.35 cm in diameter, (dilated >2.1cm) with abnormal inspiratory collapse (abnormal <50%) consistent with elevated right atrial pressure (~15, range 10-20mmHg).  ____________________________________________________________________  QUANTITATIVE DATA:  Left Ventricle Measurements: (Indexed to BSA)     IVSd (2D):   1.1 cm  LVPWd (2D):  1.1 cm  LVIDd (2D):  5.3 cm  LVIDs (2D):  4.5 cm  LV Mass:     228 g  99.5 g/m²  Visualized LV EF%: 30%     MV E Vmax:    1.05 m/s  e' lateral:   4.73 cm/s  e' medial:    4.16 cm/s  E/e' lateral: 22.20  E/e' medial:  25.24  E/e' Average: 23.62    Aorta Measurements: (Normal range) (Indexed to BSA)     Ao Root d     3.60 cm (3.1 - 3.7 cm) 1.57 cm/m²  Ao Asc d, 2D: 3.60  Ao Asc prox:  3.60 cm                1.57 cm/m²  Ao Arch:      2.8 cm            Left Atrium Measurements: (Indexed to BSA)  LA Diam 2D:        5.70 cm  LA Vol s, MOD A4C: 85.80 ml.  LA Vol s, MOD A2C: 116.00 ml.  LA Vol s, MOD BP:  100.00 ml  43.69 ml/m²       Right Ventricle Measurements: Right Atrial Measurements:     TAPSE:           1.0 cm       RA Vol:            111.00 ml  RV Base (RVID1): 5.6 cm       RA Vol s, MOD A4C  111.0 ml  RV Mid (RVID2):  4.7 cm       RA Vol Index:      48.50 ml/m²                                RA Area s, MOD A4C 29.2 cm²       LVOT / RVOT/ Qp/Qs Data: (Indexed to BSA)  LVOT Diameter,s: 2.50 cm  LVOT Area:       4.91 cm²  LVOT Vmax:       0.92 m/s  LVOT Vmn:        0.622 m/s  LVOT VTI:        16.00 cm  LVOT peak grad:  3 mmHg  LVOT mean grad:  1.5 mmHg  LVOT SV:         78.5 ml   34.31 ml/m²    Aortic Valve Measurements:  AV Vmax:                2.0 m/s  AV Peak Gradient:       16.0 mmHg  AV Mean Gradient:       9.0 mmHg  AV VTI:                 39.4 cm  AV VTI Ratio:           0.41  AoV EOA, Contin:        1.99 cm²  AoV EOA, Contin i:      0.87 cm²/m²  AoV Dimensionless Index 0.41    Mitral Valve Measurements:     MV E Vmax: 1.0 m/s       Tricuspid Valve Measurements:     TV S'             4.2 cm/s  TR Vmax:          3.0 m/s  TR Peak Gradient: 36.7 mmHg  RA Pressure:      15 mmHg  PASP:             52 mmHg

## 2025-04-11 NOTE — PROGRESS NOTE ADULT - SUBJECTIVE AND OBJECTIVE BOX
[facility] DIVISION OF KIDNEY DISEASE AND HYPERTENSION  [phone]    RENAL FOLLOW UP NOTE- NEPHROHOSPITALIST  --------------------------------------------------------------------------------  Patient seen and examined this morning, wife at bedside.  Scheduled for HD today.  Tolerated PUF with 2L removed yesterday.    PAST HISTORY  --------------------------------------------------------------------------------  No significant changes to PMH, PSH, FHx, SHx, unless otherwise noted    ALLERGIES & MEDICATIONS  --------------------------------------------------------------------------------  Allergies    No Known Allergies    Intolerances      Standing Inpatient Medications  albuterol/ipratropium for Nebulization 3 milliLiter(s) Nebulizer every 6 hours  allopurinol 100 milliGRAM(s) Oral daily  apixaban 2.5 milliGRAM(s) Oral two times a day  aspirin enteric coated 81 milliGRAM(s) Oral daily  atorvastatin 20 milliGRAM(s) Oral at bedtime  buMETAnide Injectable 2 milliGRAM(s) IV Push every 12 hours  calcium acetate 667 milliGRAM(s) Oral three times a day with meals  chlorhexidine 2% Cloths 1 Application(s) Topical daily  chlorhexidine 4% Liquid 1 Application(s) Topical <User Schedule>  dextrose 5%. 1000 milliLiter(s) IV Continuous <Continuous>  dextrose 5%. 1000 milliLiter(s) IV Continuous <Continuous>  dextrose 50% Injectable 25 Gram(s) IV Push once  dextrose 50% Injectable 12.5 Gram(s) IV Push once  dextrose 50% Injectable 25 Gram(s) IV Push once  epoetin lashawn (EPOGEN) Injectable 68851 Unit(s) IV Push <User Schedule>  fluticasone propionate/ salmeterol 250-50 MICROgram(s) Diskus 1 Dose(s) Inhalation two times a day  folic acid 1 milliGRAM(s) Oral daily  glucagon  Injectable 1 milliGRAM(s) IntraMuscular once  insulin glargine Injectable (LANTUS) 18 Unit(s) SubCutaneous at bedtime  insulin lispro (ADMELOG) corrective regimen sliding scale   SubCutaneous three times a day before meals  insulin lispro Injectable (ADMELOG) 3 Unit(s) SubCutaneous three times a day before meals  metoprolol succinate ER 25 milliGRAM(s) Oral daily  pantoprazole    Tablet 40 milliGRAM(s) Oral every 12 hours  polyethylene glycol 3350 17 Gram(s) Oral two times a day  psyllium Powder 1 Packet(s) Oral daily  sodium chloride 0.65% Nasal 1 Spray(s) Both Nostrils two times a day  tamsulosin 0.4 milliGRAM(s) Oral at bedtime  tiotropium 2.5 MICROgram(s) Inhaler 2 Puff(s) Inhalation daily    PRN Inpatient Medications  acetaminophen     Tablet .. 650 milliGRAM(s) Oral every 6 hours PRN  bisacodyl 5 milliGRAM(s) Oral every 12 hours PRN  dextrose Oral Gel 15 Gram(s) Oral once PRN  dextrose Oral Gel 15 Gram(s) Oral once PRN  melatonin 3 milliGRAM(s) Oral at bedtime PRN  ondansetron Injectable 4 milliGRAM(s) IV Push once PRN  ondansetron Injectable 4 milliGRAM(s) IV Push every 6 hours PRN  simethicone 80 milliGRAM(s) Chew every 8 hours PRN  sodium chloride 0.9% lock flush 10 milliLiter(s) IV Push every 1 hour PRN      FOCUSED REVIEW OF SYSTEMS  --------------------------------------------------------------------------------  denies fevers/rigors  denies CP/palpitations  denies SOB/cough at rest  improving LE edema      VITALS/PHYSICAL EXAM  --------------------------------------------------------------------------------  T(C): 36.9 (04-11-25 @ 12:10), Max: 37.1 (04-10-25 @ 20:04)  HR: 74 (04-11-25 @ 12:10) (68 - 78)  BP: 105/49 (04-11-25 @ 12:10) (98/61 - 105/49)  RR: 17 (04-11-25 @ 12:10) (17 - 19)  SpO2: 97% (04-11-25 @ 12:10) (96% - 99%)  Wt(kg): --        04-10-25 @ 07:01  -  04-11-25 @ 07:00  --------------------------------------------------------  IN: 300 mL / OUT: 2000 mL / NET: -1700 mL      Physical Exam:  	Gen: NAD, lying in bed  	Pulm: CTA B/L ant/lat fields  	CV: RRR, S1S2  	Abd: +BS, soft, nontender/nondistended  	: No suprapubic tenderness.  no ro          Extremity: b/l LE compression wraps, improving underlying edema  	Access: R chest tunneled catheter, no tenderness/fluctuance along tunnel      LABS/STUDIES  --------------------------------------------------------------------------------              10.4   13.93 >-----------<  151      [04-11-25 @ 06:33]              37.7     137  |  98  |  43  ----------------------------<  77      [04-11-25 @ 06:32]  4.6   |  24  |  4.86        Ca     8.7     [04-11-25 @ 06:32]              Creatinine Trend:  SCr 4.86 [04-11 @ 06:32]  SCr 3.96 [04-10 @ 06:38]  SCr 5.17 [04-09 @ 07:17]  SCr 4.84 [04-08 @ 07:21]  SCr 5.77 [04-07 @ 07:08]              Urinalysis - [04-11-25 @ 06:32]      Color  / Appearance  / SG  / pH       Gluc 77 / Ketone   / Bili  / Urobili        Blood  / Protein  / Leuk Est  / Nitrite       RBC  / WBC  / Hyaline  / Gran  / Sq Epi  / Non Sq Epi  / Bacteria       Iron 16, TIBC 243, %sat 6      [03-15-25 @ 05:59]  Ferritin 178      [01-15-25 @ 20:10]  TSH 3.44      [03-30-25 @ 11:04]  Lipid: chol 123, TG 91, HDL 33, LDL --      [08-08-24 @ 06:50]    Syphilis Screen (Treponema Pallidum Ab) Negative      [03-30-25 @ 11:04]

## 2025-04-12 LAB
ANION GAP SERPL CALC-SCNC: 14 MMOL/L — SIGNIFICANT CHANGE UP (ref 5–17)
BUN SERPL-MCNC: 32 MG/DL — HIGH (ref 7–23)
CALCIUM SERPL-MCNC: 8.6 MG/DL — SIGNIFICANT CHANGE UP (ref 8.4–10.5)
CHLORIDE SERPL-SCNC: 97 MMOL/L — SIGNIFICANT CHANGE UP (ref 96–108)
CO2 SERPL-SCNC: 25 MMOL/L — SIGNIFICANT CHANGE UP (ref 22–31)
CREAT SERPL-MCNC: 4.02 MG/DL — HIGH (ref 0.5–1.3)
CULTURE RESULTS: SIGNIFICANT CHANGE UP
EGFR: 14 ML/MIN/1.73M2 — LOW
EGFR: 14 ML/MIN/1.73M2 — LOW
GLUCOSE BLDC GLUCOMTR-MCNC: 125 MG/DL — HIGH (ref 70–99)
GLUCOSE BLDC GLUCOMTR-MCNC: 145 MG/DL — HIGH (ref 70–99)
GLUCOSE BLDC GLUCOMTR-MCNC: 150 MG/DL — HIGH (ref 70–99)
GLUCOSE BLDC GLUCOMTR-MCNC: 82 MG/DL — SIGNIFICANT CHANGE UP (ref 70–99)
GLUCOSE BLDC GLUCOMTR-MCNC: 93 MG/DL — SIGNIFICANT CHANGE UP (ref 70–99)
GLUCOSE BLDC GLUCOMTR-MCNC: 98 MG/DL — SIGNIFICANT CHANGE UP (ref 70–99)
GLUCOSE SERPL-MCNC: 73 MG/DL — SIGNIFICANT CHANGE UP (ref 70–99)
HCT VFR BLD CALC: 37.3 % — LOW (ref 39–50)
HGB BLD-MCNC: 10.2 G/DL — LOW (ref 13–17)
MCHC RBC-ENTMCNC: 25.1 PG — LOW (ref 27–34)
MCHC RBC-ENTMCNC: 27.3 G/DL — LOW (ref 32–36)
MCV RBC AUTO: 91.9 FL — SIGNIFICANT CHANGE UP (ref 80–100)
NRBC BLD AUTO-RTO: 0 /100 WBCS — SIGNIFICANT CHANGE UP (ref 0–0)
PLATELET # BLD AUTO: 137 K/UL — LOW (ref 150–400)
POTASSIUM SERPL-MCNC: 4.3 MMOL/L — SIGNIFICANT CHANGE UP (ref 3.5–5.3)
POTASSIUM SERPL-SCNC: 4.3 MMOL/L — SIGNIFICANT CHANGE UP (ref 3.5–5.3)
RBC # BLD: 4.06 M/UL — LOW (ref 4.2–5.8)
RBC # FLD: 19.3 % — HIGH (ref 10.3–14.5)
SODIUM SERPL-SCNC: 136 MMOL/L — SIGNIFICANT CHANGE UP (ref 135–145)
SPECIMEN SOURCE: SIGNIFICANT CHANGE UP
WBC # BLD: 14.56 K/UL — HIGH (ref 3.8–10.5)
WBC # FLD AUTO: 14.56 K/UL — HIGH (ref 3.8–10.5)

## 2025-04-12 PROCEDURE — 99232 SBSQ HOSP IP/OBS MODERATE 35: CPT

## 2025-04-12 PROCEDURE — 99233 SBSQ HOSP IP/OBS HIGH 50: CPT

## 2025-04-12 RX ADMIN — INSULIN LISPRO 3 UNIT(S): 100 INJECTION, SOLUTION INTRAVENOUS; SUBCUTANEOUS at 14:48

## 2025-04-12 RX ADMIN — Medication 40 MILLIGRAM(S): at 05:45

## 2025-04-12 RX ADMIN — Medication 1 APPLICATION(S): at 05:56

## 2025-04-12 RX ADMIN — FOLIC ACID 1 MILLIGRAM(S): 1 TABLET ORAL at 14:50

## 2025-04-12 RX ADMIN — METOPROLOL SUCCINATE 25 MILLIGRAM(S): 50 TABLET, EXTENDED RELEASE ORAL at 05:45

## 2025-04-12 RX ADMIN — ATORVASTATIN CALCIUM 20 MILLIGRAM(S): 80 TABLET, FILM COATED ORAL at 21:38

## 2025-04-12 RX ADMIN — BUMETANIDE 2 MILLIGRAM(S): 1 TABLET ORAL at 09:21

## 2025-04-12 RX ADMIN — Medication 667 MILLIGRAM(S): at 17:25

## 2025-04-12 RX ADMIN — BUMETANIDE 2 MILLIGRAM(S): 1 TABLET ORAL at 17:23

## 2025-04-12 RX ADMIN — INSULIN LISPRO 3 UNIT(S): 100 INJECTION, SOLUTION INTRAVENOUS; SUBCUTANEOUS at 17:32

## 2025-04-12 RX ADMIN — APIXABAN 2.5 MILLIGRAM(S): 2.5 TABLET, FILM COATED ORAL at 05:45

## 2025-04-12 RX ADMIN — APIXABAN 2.5 MILLIGRAM(S): 2.5 TABLET, FILM COATED ORAL at 17:23

## 2025-04-12 RX ADMIN — Medication 40 MILLIGRAM(S): at 17:23

## 2025-04-12 RX ADMIN — Medication 100 MILLIGRAM(S): at 14:50

## 2025-04-12 RX ADMIN — INSULIN GLARGINE-YFGN 18 UNIT(S): 100 INJECTION, SOLUTION SUBCUTANEOUS at 22:41

## 2025-04-12 RX ADMIN — IPRATROPIUM BROMIDE AND ALBUTEROL SULFATE 3 MILLILITER(S): .5; 2.5 SOLUTION RESPIRATORY (INHALATION) at 17:24

## 2025-04-12 RX ADMIN — Medication 1 SPRAY(S): at 17:24

## 2025-04-12 RX ADMIN — Medication 1 DOSE(S): at 05:45

## 2025-04-12 RX ADMIN — IPRATROPIUM BROMIDE AND ALBUTEROL SULFATE 3 MILLILITER(S): .5; 2.5 SOLUTION RESPIRATORY (INHALATION) at 05:43

## 2025-04-12 RX ADMIN — Medication 1 SPRAY(S): at 05:45

## 2025-04-12 RX ADMIN — Medication 81 MILLIGRAM(S): at 14:50

## 2025-04-12 RX ADMIN — Medication 1 DOSE(S): at 17:23

## 2025-04-12 RX ADMIN — Medication 667 MILLIGRAM(S): at 09:21

## 2025-04-12 RX ADMIN — Medication 1 PACKET(S): at 21:39

## 2025-04-12 RX ADMIN — TAMSULOSIN HYDROCHLORIDE 0.4 MILLIGRAM(S): 0.4 CAPSULE ORAL at 21:38

## 2025-04-12 RX ADMIN — INSULIN LISPRO 3 UNIT(S): 100 INJECTION, SOLUTION INTRAVENOUS; SUBCUTANEOUS at 09:22

## 2025-04-12 RX ADMIN — POLYETHYLENE GLYCOL 3350 17 GRAM(S): 17 POWDER, FOR SOLUTION ORAL at 17:24

## 2025-04-12 RX ADMIN — IPRATROPIUM BROMIDE AND ALBUTEROL SULFATE 3 MILLILITER(S): .5; 2.5 SOLUTION RESPIRATORY (INHALATION) at 01:00

## 2025-04-12 RX ADMIN — Medication 667 MILLIGRAM(S): at 14:51

## 2025-04-12 RX ADMIN — TIOTROPIUM BROMIDE INHALATION SPRAY 2 PUFF(S): 3.12 SPRAY, METERED RESPIRATORY (INHALATION) at 17:24

## 2025-04-12 RX ADMIN — IPRATROPIUM BROMIDE AND ALBUTEROL SULFATE 3 MILLILITER(S): .5; 2.5 SOLUTION RESPIRATORY (INHALATION) at 14:50

## 2025-04-12 RX ADMIN — Medication 1 APPLICATION(S): at 15:12

## 2025-04-12 NOTE — PROGRESS NOTE ADULT - ASSESSMENT
78 y/o male w/ PMHx CAD s/p CABG, HF (combined HFrEF [EF 25%] and HFpEF [G3DD]) w/ ICD, AFib on Xarelto, severe AS s/p TAVR, COPD on 3-4L home O2, CKD4, HTN, HLD, T2DM, and BPH who presents as a transfer from [facility] for HF evaluation. Ongoing CHF exacerbation now off bumex gtt, new DENISHA on CKD likely 2/2 ongoing cardiorenal and possible component of abdominal compartment syndrome given tremendous ascites, now s/p tunnelled HD catheter.  RRT/Code Stroke on 3/30/25 for lethargy and facial droop, CT negative, likely in the setting of hypercarbia, placed on bipap with improvement.

## 2025-04-12 NOTE — PROGRESS NOTE ADULT - SUBJECTIVE AND OBJECTIVE BOX
[facility] Division of Hospital Medicine  Aashish Moya MD  Available via MS Teams    SUBJECTIVE / OVERNIGHT EVENTS:  No events overnight. Plan for HD today.    MEDICATIONS  (STANDING):  albuterol/ipratropium for Nebulization 3 milliLiter(s) Nebulizer every 6 hours  allopurinol 100 milliGRAM(s) Oral daily  apixaban 2.5 milliGRAM(s) Oral two times a day  aspirin enteric coated 81 milliGRAM(s) Oral daily  atorvastatin 20 milliGRAM(s) Oral at bedtime  buMETAnide Injectable 2 milliGRAM(s) IV Push every 12 hours  calcium acetate 667 milliGRAM(s) Oral three times a day with meals  chlorhexidine 2% Cloths 1 Application(s) Topical daily  chlorhexidine 4% Liquid 1 Application(s) Topical <User Schedule>  dextrose 5%. 1000 milliLiter(s) (50 mL/Hr) IV Continuous <Continuous>  dextrose 5%. 1000 milliLiter(s) (100 mL/Hr) IV Continuous <Continuous>  dextrose 50% Injectable 25 Gram(s) IV Push once  dextrose 50% Injectable 12.5 Gram(s) IV Push once  dextrose 50% Injectable 25 Gram(s) IV Push once  epoetin lashawn (EPOGEN) Injectable 77971 Unit(s) IV Push <User Schedule>  fluticasone propionate/ salmeterol 250-50 MICROgram(s) Diskus 1 Dose(s) Inhalation two times a day  folic acid 1 milliGRAM(s) Oral daily  glucagon  Injectable 1 milliGRAM(s) IntraMuscular once  insulin glargine Injectable (LANTUS) 18 Unit(s) SubCutaneous at bedtime  insulin lispro (ADMELOG) corrective regimen sliding scale   SubCutaneous three times a day before meals  insulin lispro Injectable (ADMELOG) 3 Unit(s) SubCutaneous three times a day before meals  metoprolol succinate ER 25 milliGRAM(s) Oral daily  pantoprazole    Tablet 40 milliGRAM(s) Oral every 12 hours  polyethylene glycol 3350 17 Gram(s) Oral two times a day  psyllium Powder 1 Packet(s) Oral daily  sodium chloride 0.65% Nasal 1 Spray(s) Both Nostrils two times a day  tamsulosin 0.4 milliGRAM(s) Oral at bedtime  tiotropium 2.5 MICROgram(s) Inhaler 2 Puff(s) Inhalation daily    MEDICATIONS  (PRN):  acetaminophen     Tablet .. 650 milliGRAM(s) Oral every 6 hours PRN Temp greater or equal to 38C (100.4F), Mild Pain (1 - 3)  bisacodyl 5 milliGRAM(s) Oral every 12 hours PRN Constipation  dextrose Oral Gel 15 Gram(s) Oral once PRN Blood Glucose LESS THAN 70 milliGRAM(s)/deciliter  dextrose Oral Gel 15 Gram(s) Oral once PRN Blood Glucose LESS THAN 70 milliGRAM(s)/deciliter  melatonin 3 milliGRAM(s) Oral at bedtime PRN Insomnia  ondansetron Injectable 4 milliGRAM(s) IV Push once PRN Nausea and/or Vomiting  ondansetron Injectable 4 milliGRAM(s) IV Push every 6 hours PRN Nausea and/or Vomiting  simethicone 80 milliGRAM(s) Chew every 8 hours PRN Indigestion  sodium chloride 0.9% lock flush 10 milliLiter(s) IV Push every 1 hour PRN Pre/post blood products, medications, blood draw, and to maintain line patency      I&O's Summary    11 Apr 2025 07:01  -  12 Apr 2025 07:00  --------------------------------------------------------  IN: 400 mL / OUT: 2500 mL / NET: -2100 mL    12 Apr 2025 07:01  -  12 Apr 2025 13:47  --------------------------------------------------------  IN: 240 mL / OUT: 0 mL / NET: 240 mL        PHYSICAL EXAM:  Vital Signs Last 24 Hrs  T(C): 36.4 (12 Apr 2025 11:23), Max: 36.7 (11 Apr 2025 20:05)  T(F): 97.5 (12 Apr 2025 11:23), Max: 98.1 (11 Apr 2025 20:05)  HR: 72 (12 Apr 2025 11:23) (69 - 75)  BP: 93/54 (12 Apr 2025 11:23) (93/52 - 102/69)  BP(mean): --  RR: 18 (12 Apr 2025 11:23) (18 - 18)  SpO2: 98% (12 Apr 2025 11:23) (90% - 99%)    Parameters below as of 12 Apr 2025 11:23  Patient On (Oxygen Delivery Method): nasal cannula  O2 Flow (L/min): 3    GENERAL: NAD, well-developed, obese   HEAD:  Atraumatic, Normocephalic  EYES:  conjunctiva and sclera clear  CHEST/LUNG: +crackels bases   HEART: Regular rate and rhythm; S1S2  ABDOMEN: Soft, Nontender, Nondistended; Bowel sounds present  EXTREMITIES:  anasarca LUE swelling >RUE  PSYCH: AAOx3      LABS:                        10.2   14.56 )-----------( 137      ( 12 Apr 2025 07:22 )             37.3     04-12    136  |  97  |  32[H]  ----------------------------<  73  4.3   |  25  |  4.02[H]    Ca    8.6      12 Apr 2025 07:22            Urinalysis Basic - ( 12 Apr 2025 07:22 )    Color: x / Appearance: x / SG: x / pH: x  Gluc: 73 mg/dL / Ketone: x  / Bili: x / Urobili: x   Blood: x / Protein: x / Nitrite: x   Leuk Esterase: x / RBC: x / WBC x   Sq Epi: x / Non Sq Epi: x / Bacteria: x        COVID-19 PCR: [facility] (25 Mar 2025 14:00)  SARS-CoV-2: [facility] (10 Tay 2025 19:29)      RADIOLOGY & ADDITIONAL TESTS: Reviewed

## 2025-04-12 NOTE — PROGRESS NOTE ADULT - SUBJECTIVE AND OBJECTIVE BOX
[facility] DIVISION OF KIDNEY DISEASES AND HYPERTENSION -- HEMODIALYSIS NOTE  --------------------------------------------------------------------------------  Chief Complaint: ESRD/Ongoing hemodialysis requirement    24 hour events/subjective: no acute events        PAST HISTORY  --------------------------------------------------------------------------------  No significant changes to PMH, PSH, FHx, SHx, unless otherwise noted    ALLERGIES & MEDICATIONS  --------------------------------------------------------------------------------  Allergies    No Known Allergies    Intolerances      Standing Inpatient Medications  albuterol/ipratropium for Nebulization 3 milliLiter(s) Nebulizer every 6 hours  allopurinol 100 milliGRAM(s) Oral daily  apixaban 2.5 milliGRAM(s) Oral two times a day  aspirin enteric coated 81 milliGRAM(s) Oral daily  atorvastatin 20 milliGRAM(s) Oral at bedtime  buMETAnide Injectable 2 milliGRAM(s) IV Push every 12 hours  calcium acetate 667 milliGRAM(s) Oral three times a day with meals  chlorhexidine 2% Cloths 1 Application(s) Topical daily  chlorhexidine 4% Liquid 1 Application(s) Topical <User Schedule>  dextrose 5%. 1000 milliLiter(s) IV Continuous <Continuous>  dextrose 5%. 1000 milliLiter(s) IV Continuous <Continuous>  dextrose 50% Injectable 25 Gram(s) IV Push once  dextrose 50% Injectable 12.5 Gram(s) IV Push once  dextrose 50% Injectable 25 Gram(s) IV Push once  epoetin lashawn (EPOGEN) Injectable 88782 Unit(s) IV Push <User Schedule>  fluticasone propionate/ salmeterol 250-50 MICROgram(s) Diskus 1 Dose(s) Inhalation two times a day  folic acid 1 milliGRAM(s) Oral daily  glucagon  Injectable 1 milliGRAM(s) IntraMuscular once  insulin glargine Injectable (LANTUS) 18 Unit(s) SubCutaneous at bedtime  insulin lispro (ADMELOG) corrective regimen sliding scale   SubCutaneous three times a day before meals  insulin lispro Injectable (ADMELOG) 3 Unit(s) SubCutaneous three times a day before meals  metoprolol succinate ER 25 milliGRAM(s) Oral daily  pantoprazole    Tablet 40 milliGRAM(s) Oral every 12 hours  polyethylene glycol 3350 17 Gram(s) Oral two times a day  psyllium Powder 1 Packet(s) Oral daily  sodium chloride 0.65% Nasal 1 Spray(s) Both Nostrils two times a day  tamsulosin 0.4 milliGRAM(s) Oral at bedtime  tiotropium 2.5 MICROgram(s) Inhaler 2 Puff(s) Inhalation daily    PRN Inpatient Medications  acetaminophen     Tablet .. 650 milliGRAM(s) Oral every 6 hours PRN  bisacodyl 5 milliGRAM(s) Oral every 12 hours PRN  dextrose Oral Gel 15 Gram(s) Oral once PRN  dextrose Oral Gel 15 Gram(s) Oral once PRN  melatonin 3 milliGRAM(s) Oral at bedtime PRN  ondansetron Injectable 4 milliGRAM(s) IV Push once PRN  ondansetron Injectable 4 milliGRAM(s) IV Push every 6 hours PRN  simethicone 80 milliGRAM(s) Chew every 8 hours PRN  sodium chloride 0.9% lock flush 10 milliLiter(s) IV Push every 1 hour PRN      REVIEW OF SYSTEMS: reports edema in bilateral thighs  --------------------------------------------------------------------------------  Constitutional: [ ] Fever [ ] Chills [ ] Fatigue [ ] Weight change   HEENT: [ ] Blurred vision [ ] Eye Pain [ ] Headache [ ] Runny nose [ ] Sore Throat   Respiratory: [ ] Cough [ ] Wheezing [ ] Shortness of breath  Cardiovascular: [ ] Chest Pain [ ] Palpitations [ ] MARR [ ] PND [ ] Orthopnea  Gastrointestinal: [ ] Abdominal Pain [ ] Diarrhea [ ] Constipation [ ] Hemorrhoids [ ] Nausea [ ] Vomiting  Genitourinary: [ ] Nocturia [ ] Dysuria [ ] Incontinence  Extremities: [ ] Swelling [ ] Joint Pain  Neurologic: [ ] Focal deficit [ ] Paresthesias [ ] Syncope  Lymphatic: [ ] Swelling [ ] Lymphadenopathy   Skin: [ ] Rash [ ] Ecchymoses [ ] Wounds [ ] Lesions  Psychiatry: [ ] Depression [ ] Suicidal/Homicidal Ideation [ ] Anxiety [ ] Sleep Disturbances  [x ] 10 point review of systems is otherwise negative except as mentioned above              [ ]Unable to obtain due to   All other systems were reviewed and are negative, except as noted.      VITALS/PHYSICAL EXAM  --------------------------------------------------------------------------------  T(C): 36.4 (04-12-25 @ 11:23), Max: 36.7 (04-11-25 @ 20:05)  HR: 72 (04-12-25 @ 11:23) (69 - 75)  BP: 93/54 (04-12-25 @ 11:23) (93/52 - 102/69)  RR: 18 (04-12-25 @ 11:23) (18 - 18)  SpO2: 98% (04-12-25 @ 11:23) (90% - 99%)  Wt(kg): --        04-11-25 @ 07:01  -  04-12-25 @ 07:00  --------------------------------------------------------  IN: 400 mL / OUT: 2500 mL / NET: -2100 mL    04-12-25 @ 07:01  -  04-12-25 @ 13:00  --------------------------------------------------------  IN: 240 mL / OUT: 0 mL / NET: 240 mL      Physical Exam:  	Gen: NAD, well-appearing  	HEENT: on oxygen via nasal cannula  	Pulm: CTA B/L  	CV: normal S1S2  	Abd: soft                      Back : No sacral edema  	: No ro  	LE: + bilateral thigh edema, lower legs wrapped in bandages  	Skin: Warm  	Vascular access: RIJ tunneled dialysis catheter in situ    LABS/STUDIES  --------------------------------------------------------------------------------              10.2   14.56 >-----------<  137      [04-12-25 @ 07:22]              37.3     136  |  97  |  32  ----------------------------<  73      [04-12-25 @ 07:22]  4.3   |  25  |  4.02        Ca     8.6     [04-12-25 @ 07:22]            Iron 16, TIBC 243, %sat 6      [03-15-25 @ 05:59]  Ferritin 178      [01-15-25 @ 20:10]  TSH 3.44      [03-30-25 @ 11:04]  Lipid: chol 123, TG 91, HDL 33, LDL --      [08-08-24 @ 06:50]    HBsAg Nonreact      [03-25-25 @ 14:00]  HCV 0.19, Nonreact      [03-25-25 @ 14:00]

## 2025-04-12 NOTE — PROGRESS NOTE ADULT - ASSESSMENT
ESRD/ hypervolemia/ anemia/ hyperphosphatemia: Pt. newly deemed ESRD, now on HD.   Noted to have volume overload.   Plan for UF today.   Can continue diuretics.   On JEANCARLOS w/ HD for anemia.   On phos binders. Monitor Ca, phos levels.

## 2025-04-13 LAB
ANION GAP SERPL CALC-SCNC: 16 MMOL/L — SIGNIFICANT CHANGE UP (ref 5–17)
BUN SERPL-MCNC: 43 MG/DL — HIGH (ref 7–23)
CALCIUM SERPL-MCNC: 8.7 MG/DL — SIGNIFICANT CHANGE UP (ref 8.4–10.5)
CHLORIDE SERPL-SCNC: 97 MMOL/L — SIGNIFICANT CHANGE UP (ref 96–108)
CO2 SERPL-SCNC: 22 MMOL/L — SIGNIFICANT CHANGE UP (ref 22–31)
CREAT SERPL-MCNC: 4.63 MG/DL — HIGH (ref 0.5–1.3)
EGFR: 12 ML/MIN/1.73M2 — LOW
EGFR: 12 ML/MIN/1.73M2 — LOW
GLUCOSE BLDC GLUCOMTR-MCNC: 111 MG/DL — HIGH (ref 70–99)
GLUCOSE BLDC GLUCOMTR-MCNC: 114 MG/DL — HIGH (ref 70–99)
GLUCOSE BLDC GLUCOMTR-MCNC: 150 MG/DL — HIGH (ref 70–99)
GLUCOSE BLDC GLUCOMTR-MCNC: 92 MG/DL — SIGNIFICANT CHANGE UP (ref 70–99)
GLUCOSE SERPL-MCNC: 73 MG/DL — SIGNIFICANT CHANGE UP (ref 70–99)
HCT VFR BLD CALC: 38.1 % — LOW (ref 39–50)
HGB BLD-MCNC: 10.7 G/DL — LOW (ref 13–17)
MAGNESIUM SERPL-MCNC: 2.5 MG/DL — SIGNIFICANT CHANGE UP (ref 1.6–2.6)
MCHC RBC-ENTMCNC: 25.3 PG — LOW (ref 27–34)
MCHC RBC-ENTMCNC: 28.1 G/DL — LOW (ref 32–36)
MCV RBC AUTO: 90.1 FL — SIGNIFICANT CHANGE UP (ref 80–100)
NRBC BLD AUTO-RTO: 0 /100 WBCS — SIGNIFICANT CHANGE UP (ref 0–0)
PHOSPHATE SERPL-MCNC: 4.4 MG/DL — SIGNIFICANT CHANGE UP (ref 2.5–4.5)
PLATELET # BLD AUTO: 158 K/UL — SIGNIFICANT CHANGE UP (ref 150–400)
POTASSIUM SERPL-MCNC: 4.4 MMOL/L — SIGNIFICANT CHANGE UP (ref 3.5–5.3)
POTASSIUM SERPL-SCNC: 4.4 MMOL/L — SIGNIFICANT CHANGE UP (ref 3.5–5.3)
RBC # BLD: 4.23 M/UL — SIGNIFICANT CHANGE UP (ref 4.2–5.8)
RBC # FLD: 19.9 % — HIGH (ref 10.3–14.5)
SODIUM SERPL-SCNC: 135 MMOL/L — SIGNIFICANT CHANGE UP (ref 135–145)
WBC # BLD: 12.9 K/UL — HIGH (ref 3.8–10.5)
WBC # FLD AUTO: 12.9 K/UL — HIGH (ref 3.8–10.5)

## 2025-04-13 PROCEDURE — 99232 SBSQ HOSP IP/OBS MODERATE 35: CPT

## 2025-04-13 RX ADMIN — INSULIN LISPRO 3 UNIT(S): 100 INJECTION, SOLUTION INTRAVENOUS; SUBCUTANEOUS at 18:17

## 2025-04-13 RX ADMIN — INSULIN LISPRO 3 UNIT(S): 100 INJECTION, SOLUTION INTRAVENOUS; SUBCUTANEOUS at 09:19

## 2025-04-13 RX ADMIN — Medication 40 MILLIGRAM(S): at 06:10

## 2025-04-13 RX ADMIN — Medication 1 APPLICATION(S): at 12:12

## 2025-04-13 RX ADMIN — Medication 667 MILLIGRAM(S): at 09:20

## 2025-04-13 RX ADMIN — ATORVASTATIN CALCIUM 20 MILLIGRAM(S): 80 TABLET, FILM COATED ORAL at 22:12

## 2025-04-13 RX ADMIN — TIOTROPIUM BROMIDE INHALATION SPRAY 2 PUFF(S): 3.12 SPRAY, METERED RESPIRATORY (INHALATION) at 18:23

## 2025-04-13 RX ADMIN — Medication 1 DOSE(S): at 18:22

## 2025-04-13 RX ADMIN — TAMSULOSIN HYDROCHLORIDE 0.4 MILLIGRAM(S): 0.4 CAPSULE ORAL at 22:12

## 2025-04-13 RX ADMIN — Medication 667 MILLIGRAM(S): at 18:21

## 2025-04-13 RX ADMIN — IPRATROPIUM BROMIDE AND ALBUTEROL SULFATE 3 MILLILITER(S): .5; 2.5 SOLUTION RESPIRATORY (INHALATION) at 18:21

## 2025-04-13 RX ADMIN — FOLIC ACID 1 MILLIGRAM(S): 1 TABLET ORAL at 13:11

## 2025-04-13 RX ADMIN — INSULIN LISPRO 3 UNIT(S): 100 INJECTION, SOLUTION INTRAVENOUS; SUBCUTANEOUS at 13:11

## 2025-04-13 RX ADMIN — Medication 40 MILLIGRAM(S): at 18:22

## 2025-04-13 RX ADMIN — IPRATROPIUM BROMIDE AND ALBUTEROL SULFATE 3 MILLILITER(S): .5; 2.5 SOLUTION RESPIRATORY (INHALATION) at 06:21

## 2025-04-13 RX ADMIN — APIXABAN 2.5 MILLIGRAM(S): 2.5 TABLET, FILM COATED ORAL at 18:22

## 2025-04-13 RX ADMIN — BUMETANIDE 2 MILLIGRAM(S): 1 TABLET ORAL at 18:23

## 2025-04-13 RX ADMIN — POLYETHYLENE GLYCOL 3350 17 GRAM(S): 17 POWDER, FOR SOLUTION ORAL at 06:11

## 2025-04-13 RX ADMIN — POLYETHYLENE GLYCOL 3350 17 GRAM(S): 17 POWDER, FOR SOLUTION ORAL at 18:22

## 2025-04-13 RX ADMIN — Medication 1 DOSE(S): at 06:19

## 2025-04-13 RX ADMIN — Medication 1 SPRAY(S): at 06:19

## 2025-04-13 RX ADMIN — INSULIN GLARGINE-YFGN 18 UNIT(S): 100 INJECTION, SOLUTION SUBCUTANEOUS at 22:12

## 2025-04-13 RX ADMIN — Medication 1 SPRAY(S): at 18:22

## 2025-04-13 RX ADMIN — Medication 4 MILLIGRAM(S): at 10:02

## 2025-04-13 RX ADMIN — Medication 81 MILLIGRAM(S): at 13:11

## 2025-04-13 RX ADMIN — Medication 667 MILLIGRAM(S): at 13:11

## 2025-04-13 RX ADMIN — Medication 1 APPLICATION(S): at 07:08

## 2025-04-13 RX ADMIN — APIXABAN 2.5 MILLIGRAM(S): 2.5 TABLET, FILM COATED ORAL at 06:10

## 2025-04-13 RX ADMIN — Medication 100 MILLIGRAM(S): at 12:12

## 2025-04-13 RX ADMIN — BUMETANIDE 2 MILLIGRAM(S): 1 TABLET ORAL at 06:09

## 2025-04-13 RX ADMIN — IPRATROPIUM BROMIDE AND ALBUTEROL SULFATE 3 MILLILITER(S): .5; 2.5 SOLUTION RESPIRATORY (INHALATION) at 13:10

## 2025-04-13 NOTE — PROGRESS NOTE ADULT - TIME-BASED
50
52
45
50
52
54
42
50
50
35
50
54
50
52
54
50
40
52
50
40
51
40
40
50
40
53
42
50
65
40
50
50
51
50
51
40
42
50
50
42

## 2025-04-13 NOTE — PROGRESS NOTE ADULT - PROBLEM SELECTOR PLAN 8
- Hb currently 9.9, was 6.5 on admission to [facility]  - Patient apparently had some melena at home, intermittent NSAID use  - GI consulted at [facility], recommended pantoprazole 40mg BID and holding off endoscopy for now due to his respiratory status  -resume ASA 2/7 given stability of hgb and no ep of GIB
- On Symbicort 160-4.5 2 puffs BID + Spiriva + Duonebs PRN at home  - Advair BID + Spiriva + Duonebs q6hrs PRN for SOB/wheezing while admitted  - On 3-4LNC at home
- ECG at S personally reviewed, V-paced rhythm seen  - resumed eliquis (instead of xarelto as he is now on HD)
Multiple episodes of epistaxis this admission, on ASA and Eliquis. Hgb stable. Improved with packing and afrin spray  - Nasal sprays BID  - If persistent bleeding despite the above, will consult ENT
S/p CABG
- ECG at S personally reviewed, V-paced rhythm seen  - resumed eliquis (instead of xarelto as he is now on HD)
- On Symbicort 160-4.5 2 puffs BID + Spiriva + Duonebs PRN at home  - Advair BID + Spiriva + Duonebs q6hrs PRN for SOB/wheezing while admitted  - On 3-4LNC at home
- On 3-4LNC chronically at home due to COPD  - Back to baseline, titrate O2 as needed to maintain SpO2 88-92%
- ECG at S personally reviewed, V-paced rhythm seen  - resumed eliquis (instead of xarelto as he is now on HD)
- Hb currently 9.9, was 6.5 on admission to [facility]  - Patient apparently had some melena at home, intermittent NSAID use  - GI consulted at [facility], recommended pantoprazole 40mg BID and holding off endoscopy for now due to his respiratory status  -resume ASA 2/7 given stability of hgb and no ep of GIB
- ECG at S personally reviewed, V-paced rhythm seen  - resumed eliquis (instead of xarelto as he is now on HD)
- On 3-4LNC chronically at home due to COPD  - Back to baseline, titrate O2 as needed to maintain SpO2 88-92%
- ECG at S personally reviewed, V-paced rhythm seen  - resumed eliquis (instead of xarelto as he is now on HD)
Multiple episodes of epistaxis this admission, on ASA and Eliquis. Hgb stable. Improved with packing and afrin spray  - ASA held pending continued hemostasis  - Nasal sprays BID  - If persistent bleeding despite the above, will consult ENT
S/p CABG
- Hb currently 9.9, was 6.5 on admission to [facility]  - Patient apparently had some melena at home, intermittent NSAID use  - GI consulted at [facility], recommended pantoprazole 40mg BID and holding off endoscopy for now due to his respiratory status  -resume ASA 2/7 given stability of hgb and no ep of GIB
- On 3-4LNC chronically at home due to COPD  - Back to baseline, titrate O2 as needed to maintain SpO2 88-92%
- On 3-4LNC chronically at home due to COPD  - Back to baseline, titrate O2 as needed to maintain SpO2 88-92%
Multiple episodes of epistaxis this admission, on ASA and Eliquis. Hgb stable. Improved with packing and afrin spray  - Resolved
- Hb currently 9.9, was 6.5 on admission to [facility]  - Patient apparently had some melena at home, intermittent NSAID use  - GI consulted at [facility], recommended pantoprazole 40mg BID and holding off endoscopy for now due to his respiratory status  -resume ASA 2/7 given stability of hgb and no ep of GIB
- On 3-4LNC chronically at home due to COPD  - Back to baseline, titrate O2 as needed to maintain SpO2 88-92%
- On Symbicort 160-4.5 2 puffs BID + Spiriva + Duonebs PRN at home  - Advair BID + Spiriva + Duonebs q6hrs PRN for SOB/wheezing while admitted  - On 3-4LNC at home
Multiple episodes of epistaxis this admission, on ASA and Eliquis. Hgb stable. Improved with packing and afrin spray  - Nasal sprays BID  - If persistent bleeding despite the above, will consult ENT
- On Symbicort 160-4.5 2 puffs BID + Spiriva + Duonebs PRN at home  - Advair BID + Spiriva + Duonebs q6hrs PRN for SOB/wheezing while admitted  - On 3-4LNC at home
- Hb currently 9.9, was 6.5 on admission to [facility]  - Patient apparently had some melena at home, intermittent NSAID use  - GI consulted at [facility], recommended pantoprazole 40mg BID and holding off endoscopy for now due to his respiratory status  -resume ASA 2/7 given stability of hgb and no ep of GIB
S/p CABG
- On 3-4LNC chronically at home due to COPD  - Back to baseline, titrate O2 as needed to maintain SpO2 88-92%
- On 3-4LNC chronically at home due to COPD  - Back to baseline, titrate O2 as needed to maintain SpO2 88-92%
Multiple episodes of epistaxis this admission, on ASA and Eliquis. Hgb stable  - ASA held pending continued hemostasis  - Nasal sprays BID, nasal packing  - Afrin nasal spray to R nares 2/27-2/29 3d  - If persistent bleeding despite the above, will consult ENT
Multiple episodes of epistaxis this admission, on ASA and Eliquis. Hgb stable. Improved with packing and afrin spray  - Nasal sprays BID  - If persistent bleeding despite the above, will consult ENT
- Hb currently 9.9, was 6.5 on admission to [facility]  - Patient apparently had some melena at home, intermittent NSAID use  - GI consulted at [facility], recommended pantoprazole 40mg BID and holding off endoscopy for now due to his respiratory status  -resume ASA 2/7 given stability of hgb and no ep of GIB
Multiple episodes of epistaxis this admission, on ASA and Eliquis. Hgb stable. Improved with packing and afrin spray  - Nasal sprays BID  - If persistent bleeding despite the above, will consult ENT
- Hb currently 9.9, was 6.5 on admission to [facility]  - Patient apparently had some melena at home, intermittent NSAID use  - GI consulted at [facility], recommended pantoprazole 40mg BID and holding off endoscopy for now due to his respiratory status  -resume ASA 2/7 given stability of hgb and no ep of GIB
Multiple episodes of epistaxis this admission, on ASA and Eliquis. Hgb stable. Improved with packing and afrin spray  - Nasal sprays BID  - improved
- ECG at S personally reviewed, V-paced rhythm seen  - resumed eliquis (instead of xarelto as he is now on HD)
- On Symbicort 160-4.5 2 puffs BID + Spiriva + Duonebs PRN at home  - Advair BID + Spiriva + Duonebs q6hrs PRN for SOB/wheezing while admitted  - On 3-4LNC at home
Multiple episodes of epistaxis this admission, on ASA and Eliquis. Hgb stable. Improved with packing and afrin spray  - Nasal sprays BID  - improved
- Hb currently 9.9, was 6.5 on admission to [facility]  - Patient apparently had some melena at home, intermittent NSAID use  - GI consulted at [facility], recommended pantoprazole 40mg BID and holding off endoscopy for now due to his respiratory status  -resume ASA 2/7 given stability of hgb and no ep of GIB
Multiple episodes of epistaxis this admission, on ASA and Eliquis. Hgb stable. Improved with packing and afrin spray  - Nasal sprays BID  - If persistent bleeding despite the above, will consult ENT
- Hb currently 9.9, was 6.5 on admission to [facility]  - Patient apparently had some melena at home, intermittent NSAID use  - GI consulted at [facility], recommended pantoprazole 40mg BID and holding off endoscopy for now due to his respiratory status  -resume ASA 2/7 given stability of hgb and no ep of GIB
- Hb currently 9.9, was 6.5 on admission to [facility]  - Patient apparently had some melena at home, intermittent NSAID use  - GI consulted at [facility], recommended pantoprazole 40mg BID and holding off endoscopy for now due to his respiratory status  -resume ASA 2/7 given stability of hgb and no ep of GIB
Multiple episodes of epistaxis this admission, on ASA and Eliquis. Hgb stable. Improved with packing and afrin spray  - Nasal sprays BID  - If persistent bleeding despite the above, will consult ENT
- Hb currently 9.9, was 6.5 on admission to [facility]  - Patient apparently had some melena at home, intermittent NSAID use  - GI consulted at [facility], recommended pantoprazole 40mg BID and holding off endoscopy for now due to his respiratory status  -resume ASA 2/7 given stability of hgb and no ep of GIB
- Hb currently 9.9, was 6.5 on admission to [facility]  - Patient apparently had some melena at home, intermittent NSAID use  - GI consulted at [facility], recommended pantoprazole 40mg BID and holding off endoscopy for now due to his respiratory status  -resume ASA 2/7 given stability of hgb and no ep of GIB
S/p CABG
S/p CABG
- ECG at S personally reviewed, V-paced rhythm seen  - resumed eliquis (instead of xarelto as he is now on HD)
- Hb currently 9.9, was 6.5 on admission to [facility]  - Patient apparently had some melena at home, intermittent NSAID use  - GI consulted at [facility], recommended pantoprazole 40mg BID and holding off endoscopy for now due to his respiratory status  -resume ASA 2/7 given stability of hgb and no ep of GIB
- On 3-4LNC chronically at home due to COPD  - Back to baseline, titrate O2 as needed to maintain SpO2 88-92%
- Hb currently 9.9, was 6.5 on admission to [facility]  - Patient apparently had some melena at home, intermittent NSAID use  - GI consulted at [facility], recommended pantoprazole 40mg BID and holding off endoscopy for now due to his respiratory status  -resume ASA 2/7 given stability of hgb and no ep of GIB
- Hb currently 9.9, was 6.5 on admission to [facility]  - Patient apparently had some melena at home, intermittent NSAID use  - GI consulted at [facility], recommended pantoprazole 40mg BID and holding off endoscopy for now due to his respiratory status  -resume ASA 2/7 given stability of hgb and no ep of GIB
Multiple episodes of epistaxis this admission, on ASA and Eliquis. Hgb stable. Improved with packing and afrin spray  - Nasal sprays BID  - If persistent bleeding despite the above, will consult ENT
- Hb currently 9.9, was 6.5 on admission to [facility]  - Patient apparently had some melena at home, intermittent NSAID use  - GI consulted at [facility], recommended pantoprazole 40mg BID and holding off endoscopy for now due to his respiratory status  -resume ASA 2/7 given stability of hgb and no ep of GIB
Multiple episodes of epistaxis this admission, on ASA and Eliquis. Hgb stable. Improved with packing and afrin spray  - Nasal sprays BID  - If persistent bleeding despite the above, will consult ENT
- Hb currently 9.9, was 6.5 on admission to [facility]  - Patient apparently had some melena at home, intermittent NSAID use  - GI consulted at [facility], recommended pantoprazole 40mg BID and holding off endoscopy for now due to his respiratory status  -resume ASA 2/7 given stability of hgb and no ep of GIB
Multiple episodes of epistaxis this admission, on ASA and Eliquis. Hgb stable. Improved with packing and afrin spray  - Nasal sprays BID  - If persistent bleeding despite the above, will consult ENT
- ECG at S personally reviewed, V-paced rhythm seen  - resumed eliquis (instead of xarelto as he is now on HD)
- Hb currently 9.9, was 6.5 on admission to [facility]  - Patient apparently had some melena at home, intermittent NSAID use  - GI consulted at [facility], recommended pantoprazole 40mg BID and holding off endoscopy for now due to his respiratory status  -resume ASA 2/7 given stability of hgb and no ep of GIB
S/p CABG
- Hb currently 9.9, was 6.5 on admission to [facility]  - Patient apparently had some melena at home, intermittent NSAID use  - GI consulted at [facility], recommended pantoprazole 40mg BID and holding off endoscopy for now due to his respiratory status  -resume ASA if no reoccurance of melena
- Hb currently 9.9, was 6.5 on admission to [facility]  - Patient apparently had some melena at home, intermittent NSAID use  - GI consulted at [facility], recommended pantoprazole 40mg BID and holding off endoscopy for now due to his respiratory status  -resume ASA 2/7 given stability of hgb and no ep of GIB
- Hb currently 9.9, was 6.5 on admission to [facility]  - Patient apparently had some melena at home, intermittent NSAID use  - GI consulted at [facility], recommended pantoprazole 40mg BID and holding off endoscopy for now due to his respiratory status  -resume ASA 2/7 given stability of hgb and no ep of GIB
- On 3-4LNC chronically at home due to COPD  - Back to baseline, titrate O2 as needed to maintain SpO2 88-92%
- On Symbicort 160-4.5 2 puffs BID + Spiriva + Duonebs PRN at home  - Advair BID + Spiriva + Duonebs q6hrs PRN for SOB/wheezing while admitted  - On 3-4LNC at home
S/p CABG

## 2025-04-13 NOTE — PROGRESS NOTE ADULT - PROBLEM SELECTOR PROBLEM 8
Anemia
CAD (coronary artery disease)
CAD (coronary artery disease)
Chronic atrial fibrillation
Epistaxis
Anemia
Anemia
Epistaxis
Chronic hypoxic respiratory failure
Chronic hypoxic respiratory failure
Anemia
Epistaxis
Chronic hypoxic respiratory failure
Anemia
CAD (coronary artery disease)
Epistaxis
Epistaxis
Anemia
Anemia
Chronic hypoxic respiratory failure
Epistaxis
Epistaxis
Anemia
Anemia
Chronic atrial fibrillation
COPD, severe
Epistaxis
CAD (coronary artery disease)
CAD (coronary artery disease)
Epistaxis
Anemia
COPD, severe
Chronic hypoxic respiratory failure
Chronic hypoxic respiratory failure
Anemia
Chronic atrial fibrillation
COPD, severe
Chronic atrial fibrillation
CAD (coronary artery disease)
Epistaxis
Anemia
Chronic atrial fibrillation
COPD, severe
CAD (coronary artery disease)
Chronic atrial fibrillation
COPD, severe
COPD, severe
Epistaxis
Anemia
Chronic hypoxic respiratory failure
Chronic hypoxic respiratory failure
Epistaxis
Epistaxis
Chronic atrial fibrillation
Chronic hypoxic respiratory failure
Anemia
Chronic atrial fibrillation
Epistaxis

## 2025-04-13 NOTE — PROGRESS NOTE ADULT - ASSESSMENT
78 y/o male w/ PMHx CAD s/p CABG, HF (combined HFrEF [EF 25%] and HFpEF [G3DD]) w/ ICD, AFib on Xarelto, severe AS s/p TAVR, COPD on 3-4L home O2, CKD4, HTN, HLD, T2DM, and BPH who presents as a transfer from [facility] for HF evaluation. Ongoing CHF exacerbation now off bumex gtt, new DENISHA on CKD likely 2/2 ongoing cardiorenal and possible component of abdominal compartment syndrome given tremendous ascites, now s/p tunnelled HD catheter.  RRT/Code Stroke on 3/30/25 for lethargy and facial droop, CT negative, likely in the setting of hypercarbia, placed on bipap with improvement.

## 2025-04-13 NOTE — PROGRESS NOTE ADULT - PROBLEM SELECTOR PLAN 6
- On Symbicort 160-4.5 2 puffs BID + Spiriva + Duonebs PRN at home  - Advair BID + Spiriva + Duonebs q6hrs PRN for SOB/wheezing while admitted  - On 3-4LNC at home
- On Symbicort 160-4.5 2 puffs BID + Spiriva + Duonebs PRN at home  - Advair BID + Spiriva + Duonebs q6hrs PRN for SOB/wheezing while admitted  - On 3-4LNC at home
- in the setting of fluid overload  - management as above   - resolved
- On Symbicort 160-4.5 2 puffs BID + Spiriva + Duonebs PRN at home  - Advair BID + Spiriva + Duonebs q6hrs PRN for SOB/wheezing while admitted  - On 3-4LNC at home
S/p CABG  - Continue asa, BB, statin
- US doppler done for LUE swelling at S found non-occlusive thrombus within the brachial and basilic vein in Jan 2025  -repeat [facility] duplex LUE 2/7- resolved thrombus
- in the setting of fluid overload  - management as above   - resolved
- On Symbicort 160-4.5 2 puffs BID + Spiriva + Duonebs PRN at home  - Advair BID + Spiriva + Duonebs q6hrs PRN for SOB/wheezing while admitted  - On 3-4LNC at home
- in the setting of fluid overload  - management as above   - resolved
S/p CABG
- ECG at [facility] personally reviewed, V-paced rhythm seen  - holding Xarelto 15mg daily
- On Symbicort 160-4.5 2 puffs BID + Spiriva + Duonebs PRN at home  - Advair BID + Spiriva + Duonebs q6hrs PRN for SOB/wheezing while admitted  - On 3-4LNC at home
- US doppler done for LUE swelling at S found non-occlusive thrombus within the brachial and basilic vein in Jan 2025  -repeat VA duplex LUE 2/7- resolved thrombus
- ECG at [facility] personally reviewed, V-paced rhythm seen  - holding Xarelto 15mg daily
- On Symbicort 160-4.5 2 puffs BID + Spiriva + Duonebs PRN at home  - Advair BID + Spiriva + Duonebs q6hrs PRN for SOB/wheezing while admitted  - On 3-4LNC at home
- ECG at [facility] personally reviewed, V-paced rhythm seen  - holding Xarelto 15mg daily
- On Symbicort 160-4.5 2 puffs BID + Spiriva + Duonebs PRN at home  - Advair BID + Spiriva + Duonebs q6hrs PRN for SOB/wheezing while admitted  - On 3-4LNC at home
- US doppler done for LUE swelling at S found non-occlusive thrombus within the brachial and basilic vein in Jan 2025  -repeat VA duplex LUE 2/7- resolved thrombus
S/p CABG  - Continue asa, BB, statin
- On Symbicort 160-4.5 2 puffs BID + Spiriva + Duonebs PRN at home  - Advair BID + Spiriva + Duonebs q6hrs PRN for SOB/wheezing while admitted  - On 3-4LNC at home
- ECG at [facility] personally reviewed, V-paced rhythm seen  - holding Xarelto 15mg daily
- On Symbicort 160-4.5 2 puffs BID + Spiriva + Duonebs PRN at home  - Advair BID + Spiriva + Duonebs q6hrs PRN for SOB/wheezing while admitted  - On 3-4LNC at home
- US doppler done for LUE swelling at S found non-occlusive thrombus within the brachial and basilic vein in Jan 2025  -repeat VA duplex LUE 2/7- resolved thrombus
- in the setting of fluid overload  - management as above   - resolved
- in the setting of fluid overload  - management as above   - resolved
- US doppler done for LUE swelling at S found non-occlusive thrombus within the brachial and basilic vein in Jan 2025  -repeat VA duplex LUE 2/7- resolved thrombus
- ECG at [facility] personally reviewed, V-paced rhythm seen  - holding Xarelto 15mg daily
- On Symbicort 160-4.5 2 puffs BID + Spiriva + Duonebs PRN at home  - Advair BID + Spiriva + Duonebs q6hrs PRN for SOB/wheezing while admitted  - On 3-4LNC at home
- US doppler done for LUE swelling at S found non-occlusive thrombus within the brachial and basilic vein in Jan 2025  -repeat VA duplex LUE 2/7- resolved thrombus
- On Symbicort 160-4.5 2 puffs BID + Spiriva + Duonebs PRN at home  - Advair BID + Spiriva + Duonebs q6hrs PRN for SOB/wheezing while admitted  - On 3-4LNC at home
S/p CABG
- in the setting of fluid overload  - management as above   - resolved
- ECG at [facility] personally reviewed, V-paced rhythm seen  - holding Xarelto 15mg daily
- US doppler done for LUE swelling at S found non-occlusive thrombus within the brachial and basilic vein in Jan 2025  -repeat VA duplex LUE 2/7- resolved thrombus
- ECG at [facility] personally reviewed, V-paced rhythm seen  - holding Xarelto 15mg daily
- ECG at [facility] personally reviewed, V-paced rhythm seen  - holding Xarelto 15mg daily
- On Symbicort 160-4.5 2 puffs BID + Spiriva + Duonebs PRN at home  - Advair BID + Spiriva + Duonebs q6hrs PRN for SOB/wheezing while admitted  - On 3-4LNC at home
S/p CABG  - Continue asa, BB, statin
- On Symbicort 160-4.5 2 puffs BID + Spiriva + Duonebs PRN at home  - Advair BID + Spiriva + Duonebs q6hrs PRN for SOB/wheezing while admitted  - On 3-4LNC at home
- US doppler done for LUE swelling at S found non-occlusive thrombus within the brachial and basilic vein in Jan 2025  -repeat [facility] duplex LUE 2/7- resolved thrombus
- On Symbicort 160-4.5 2 puffs BID + Spiriva + Duonebs PRN at home  - Advair BID + Spiriva + Duonebs q6hrs PRN for SOB/wheezing while admitted  - On 3-4LNC at home
- ECG at [facility] personally reviewed, V-paced rhythm seen  - holding Xarelto 15mg daily
- On Symbicort 160-4.5 2 puffs BID + Spiriva + Duonebs PRN at home  - Advair BID + Spiriva + Duonebs q6hrs PRN for SOB/wheezing while admitted  - On 3-4LNC at home
S/p CABG
- in the setting of fluid overload  - management as above   - resolved

## 2025-04-13 NOTE — PROGRESS NOTE ADULT - PROBLEM SELECTOR PLAN 12
#Melena  - Hb was 6.5 on admission to [facility]  - Patient apparently had some melena at home, intermittent NSAID use  - GI consulted at [facility], recommended pantoprazole 40mg BID and holding off endoscopy for now due to his respiratory status
- On Lantus 15u QHS + Trulicity weekly at home, patient states he wouldn't take it regularly  - c/w basal bolus + SSI for -180s
BPH - C/w tamsulosin 0.4mg QHS  DVT PPx: Xarelto  Code status: DNR/DNI w/ NIV per palliative GOC with wife at [facility], MOL in physical chart  palliative discussion had re: ICD- wife would like to keep it on for now    dispo: CHIARA pending improvement in DENISHA, hopefully 1-2 more days  prognosis guarded  plan d/w and agreed on by pt, hf and floor acp
- On Lantus 15u QHS + Trulicity weekly at home, patient states he wouldn't take it regularly  - c/w basal bolus + SSI for -180s    dispo: CHIARA pending transition off bumex gtt and further GOC/palliative input- guarded prognosis
BPH - C/w tamsulosin 0.4mg QHS  DVT PPx: Xarelto  Code status: DNR/DNI w/ NIV per palliative GOC with wife at [facility] in physical chart    dispo: CHIARA pending euvolemia and transition off bumex gtt, anticipate 1-2 days.  plan d/w and agreed on by pt and wife at bedside.
- Atorvastatin 20mg QHS in place of home simvastatin 40mg QHS
BPH - C/w tamsulosin 0.4mg QHS  DVT PPx: Xarelto  Code status: DNR/DNI w/ NIV per palliative GOC with wife at [facility], [facility] in physical chart  palliative discussion had re: ICD- wife would like to keep it on for now    dispo: CHIARA pending improvement in fluid status, renal function
- On Lantus 15u QHS + Trulicity weekly at home, patient states he wouldn't take it regularly  - c/w basal bolus + SSI for -180s
- Takes Ramipril 2.5mg daily and lasix 40mg BID  - DC ramipril with transition to entresto once renal function improves.
BPH - C/w tamsulosin 0.4mg QHS  DVT PPx: Xarelto  Code status: DNR/DNI w/ NIV per palliative GOC with wife at [facility], [facility] in physical chart  palliative discussion had re: ICD- wife would like to keep it on for now    dispo: CHIARA pending improvement in fluid status, renal function
D/w pt and wife extensively the plan of care   Pt would benefit from [facility]IARA/LTC as pt is deconditioned to improve strength, mobility, functionality   Fast appeal for facility in progress  At this point pt and family want HD  [facility]  Will trial daily HD till saturday, if no improvement reconsult pallative for meeting
- On Lantus 15u QHS + Trulicity weekly at home, patient states he wouldn't take it regularly  - c/w basal bolus + SSI for -180s    dispo: CHIARA pending transition off bumex gtt, abx plan and further GOC/palliative input- guarded prognosis  d/w and agreed on by pt and wife at bedside.
- Takes Ramipril 2.5mg daily and lasix 40mg BID  - DC ramipril with transition to entresto once renal function improves.
BPH - C/w tamsulosin 0.4mg QHS  DVT PPx: Xarelto  Code status: DNR/DNI w/ NIV per palliative GOC with wife at [facility], MOL in physical chart
BPH - C/w tamsulosin 0.4mg QHS  DVT PPx: Xarelto  Code status: DNR/DNI w/ NIV per palliative GOC with wife at [facility], M[facility] in physical chart  palliative discussion had re: ICD- wife would like to keep it on for now    dispo: CHIAAR pending improvement in DENISHA, hopefully 1-2 more days  prognosis guarded  plan d/w and agreed on by pt, hf and floor acp
- On Lantus 15u QHS + Trulicity weekly at home, patient states he wouldn't take it regularly  - c/w basal bolus + SSI for -180s
D/w pt and wife extensively the plan of care   Pt would benefit from [facility]/LTC as pt is deconditioned to improve strength, mobility, functionality   Fast appeal for facility in progress  At this point pt and family want HD  [facility]  Will trial daily HD till saturday, if no improvement reconsult pallative for meeting
- Atorvastatin 20mg QHS in place of home simvastatin 40mg QHS    dispo: prognosis worsening- wife and pt still debating HD. Nephrology recommends placing baudilioley today (fri) if pt agrees.
- On Lantus 15u QHS + Trulicity weekly at home, patient states he wouldn't take it regularly  - c/w basal bolus + SSI for -180s
- Atorvastatin 20mg QHS in place of home simvastatin 40mg QHS    dispo: prognosis worsening- anticipate pt has 2-4 days; family contemplating comfort care/hospice. For now, continuing hb monitoring, transfusions and expectant monitoring
BPH - C/w tamsulosin 0.4mg QHS  DVT PPx: Xarelto  Code status: DNR/DNI w/ NIV per palliative GOC with wife at [facility], [facility] in physical chart  palliative discussion had re: ICD- wife would like to keep it on for now    dispo: CHIARA pending euvolemia and transition off bumex gtt, stable BP, anticipate 1-2 days.  plan d/w and agreed on by pt and wife at bedside.
#Melena  - Hb was 6.5 on admission to [facility]  - Patient apparently had some melena at home, intermittent NSAID use  - GI consulted at [facility], recommended pantoprazole 40mg BID and holding off endoscopy for now due to his respiratory status
BPH - C/w tamsulosin 0.4mg QHS  DVT PPx: Xarelto  Code status: DNR/DNI w/ NIV per palliative GOC with wife at [facility], [facility] in physical chart  palliative discussion had re: ICD- wife would like to keep it on for now    dispo: CHIARA pending euvolemia and transition off bumex gtt, stable BP, anticipate early next week per HF
BPH - C/w tamsulosin 0.4mg QHS  DVT PPx: Xarelto  Code status: DNR/DNI w/ NIV per palliative GOC with wife at [facility], [facility] in physical chart  palliative discussion had re: ICD- wife would like to keep it on for now    dispo: CHIARA pending improvement in DENISHA, hopefully 1-2 more days  plan d/w and agreed on by pt, hf and floor acp
BPH - C/w tamsulosin 0.4mg QHS  DVT PPx: Xarelto  Code status: DNR/DNI w/ NIV per palliative GOC with wife at [facility] in physical chart  palliative discussion had re: ICD- wife would like to keep it on for now    dispo: CHIARA pending improvement in DENISHA, hopefully 1-2 more days, final recs regarding torsemide  prognosis guarded  plan d/w and agreed on by pt, hf and floor acp
- Atorvastatin 20mg QHS in place of home simvastatin 40mg QHS    dispo: prognosis worsening- anticipate pt has 2-4 days; family contemplating comfort care/hospice. For now, continuing hb monitoring, transfusions and expectant monitoring
BPH - C/w tamsulosin 0.4mg QHS  DVT PPx: Xarelto  Code status: DNR/DNI w/ NIV [facility]  palliative discussion had re: ICD- wife would like to keep it on for now    dispo: CHIARA pending euvolemia and transition off bumex gtt, stable BP, anticipate early next week per HF. spoke with wife 2/17 - okay with CHIARA
- On Lantus 15u QHS + Trulicity weekly at home, patient states he wouldn't take it regularly  - c/w basal bolus + SSI for -180s
- Atorvastatin 20mg QHS in place of home simvastatin 40mg QHS
#Melena  - Hb was 6.5 on admission to [facility]  - Patient apparently had some melena at home, intermittent NSAID use  - GI consulted at [facility], recommended pantoprazole 40mg BID and holding off endoscopy for now due to his respiratory status
BPH - C/w tamsulosin 0.4mg QHS  DVT PPx: Xarelto  Code status: DNR/DNI w/ NIV per palliative GOC with wife at [facility] in physical chart    dispo: CHIARA pending euvolemia and transition off bumex gtt, anticipate 1-3 days.   plan d/w and agreed on by pt and wife at bedside.
BPH - C/w tamsulosin 0.4mg QHS  DVT PPx: Xarelto  Code status: DNR/DNI w/ NIV per palliative GOC with wife at [facility], [facility] in physical chart
#Melena  - Hb was 6.5 on admission to [facility]  - Patient apparently had some melena at home, intermittent NSAID use  - GI consulted at [facility], recommended pantoprazole 40mg BID and holding off endoscopy for now due to his respiratory status
- Atorvastatin 20mg QHS in place of home simvastatin 40mg QHS
BPH - C/w tamsulosin 0.4mg QHS  DVT PPx: Xarelto  Code status: DNR/DNI w/ NIV per palliative GOC with wife at [facility], [facility] in physical chart  palliative discussion had re: ICD- wife would like to keep it on for now    dispo: CHIARA pending euvolemia and transition off bumex IV, hopefully tomorrow  plan d/w and agreed on by pt, hf and floor acp
BPH - C/w tamsulosin 0.4mg QHS  DVT PPx: Xarelto  Code status: DNR/DNI w/ NIV per palliative GOC with wife at [facility], [facility] in physical chart  palliative discussion had re: ICD- wife would like to keep it on for now    dispo: CHIARA pending euvolemia and transition off bumex gtt, stable BP, anticipate early next week per HF
D/w pt and wife extensively the plan of care   Pt would benefit from [facility]/LTC as pt is deconditioned to improve strength, mobility, functionality   Fast appeal for facility in progress  At this point pt and family want HD  [facility]  Will trial daily HD till saturday, if no improvement reconsult pallative for meeting
D/w pt and wife extensively the plan of care   Pt would benefit from [facility]/LTC as pt is deconditioned to improve strength, mobility, functionality   Fast appeal for facility in progress  At this point pt and family want HD  [facility]  Will trial daily HD till saturday, if no improvement reconsult pallative for meeting
D/w pt and wife extensively the plan of care   Pt would benefit from [facility]/LTC as pt is deconditioned to improve strength, mobility, functionality   Fast appeal for facility in progress  Pending results for fast appeal then will reconvene for meeting   At this point pt and family want HD  Case escalated to try to repartate back to [facility]
- On Lantus 15u QHS + Trulicity weekly at home, patient states he wouldn't take it regularly  - c/w basal bolus + SSI for -180s    dispo: CHIARA pending transition off bumex gtt and further GOC/palliative input  plan d/w and agreed on by pt and wife at bedside
#Melena  - Hb was 6.5 on admission to [facility]  - Patient apparently had some melena at home, intermittent NSAID use  - GI consulted at [facility], recommended pantoprazole 40mg BID and holding off endoscopy for now due to his respiratory status
- Atorvastatin 20mg QHS in place of home simvastatin 40mg QHS    dispo: prognosis worsening- family considering home hospice, would like to revisit on friday  plan d/w and agreed on by pt, wife and son at bedside, palliative care attg
- On Lantus 15u QHS + Trulicity weekly at home, patient states he wouldn't take it regularly  - c/w basal bolus + SSI for -180s
#Melena  - Hb was 6.5 on admission to [facility]  - Patient apparently had some melena at home, intermittent NSAID use  - GI consulted at [facility], recommended pantoprazole 40mg BID and holding off endoscopy for now due to his respiratory status
BPH - C/w tamsulosin 0.4mg QHS  DVT PPx: Xarelto  Code status: DNR/DNI w/ NIV per palliative GOC with wife at [facility], [facility] in physical chart  palliative discussion had re: ICD- wife would like to keep it on for now    dispo: CHIARA pending euvolemia and transition off bumex gtt, stable BP, anticipate 1-2 days.  plan d/w and agreed on by pt and wife at bedside.
- On Lantus 15u QHS + Trulicity weekly at home, patient states he wouldn't take it regularly  - c/w basal bolus + SSI for -180s
BPH - C/w tamsulosin 0.4mg QHS  DVT PPx: Xarelto  Code status: DNR/DNI w/ NIV per palliative GOC with wife at [facility], [facility] in physical chart  palliative discussion had re: ICD- wife would like to keep it on for now    dispo: CHIARA pending euvolemia, hopeflly 1-2 days  plan d/w and agreed on by pt, hf and floor acp
BPH - C/w tamsulosin 0.4mg QHS  DVT PPx: Xarelto  Code status: DNR/DNI w/ NIV per palliative GOC with wife at [facility], [facility] in physical chart  palliative discussion had re: ICD- wife would like to keep it on for now    dispo: CHIARA pending euvolemia and transition off bumex gtt, stable BP, anticipate 1-2 days.  plan d/w and agreed on by pt and wife at bedside.
D/w pt and wife extensively the plan of care 4/7  Pt would benefit from [facility]/LTC as pt is deconditioned to improve strength, mobility, functionality   Fast appeal for facility in progress    D/w pallative plan for family meeting 4/9 11:30 am
BPH - C/w tamsulosin 0.4mg QHS  DVT PPx: Xarelto  Code status: DNR/DNI w/ NIV per palliative GOC with wife at [facility] in physical chart    dispo: CHIARA pending euvolemia and transition off bumex gtt, anticipate 1-2 days.  plan d/w and agreed on by pt and wife at bedside.
- On Lantus 15u QHS + Trulicity weekly at home, patient states he wouldn't take it regularly  - c/w basal bolus + SSI for -180s
- On Lantus 15u QHS + Trulicity weekly at home, patient states he wouldn't take it regularly  - c/w basal bolus + SSI for -180s
- On Lantus 15u QHS + Trulicity weekly at home, patient states he wouldn't take it regularly  - c/w basal bolus + SSI for -180s    dispo: CHIARA pending transition off bumex gtt  plan d/w and agreed on by pt and wife at bedside
- Takes Ramipril 2.5mg daily and lasix 40mg BID  - DC ramipril in setting of renal failure. Outpatient follow up.
- Takes Ramipril 2.5mg daily and lasix 40mg BID  - DC ramipril with transition to entresto once renal function improves.
- On Lantus 15u QHS + Trulicity weekly at home, patient states he wouldn't take it regularly  - c/w basal bolus + SSI for -180s
#Melena  - Hb was 6.5 on admission to [facility]  - Patient apparently had some melena at home, intermittent NSAID use  - GI consulted at [facility], recommended pantoprazole 40mg BID and holding off endoscopy for now due to his respiratory status
- Atorvastatin 20mg QHS in place of home simvastatin 40mg QHS    dispo: prognosis worsening- family considering home hospice, would like to revisit on friday  plan d/w and agreed on by pt, wife and son at bedside
- On Lantus 15u QHS + Trulicity weekly at home, patient states he wouldn't take it regularly  - c/w basal bolus + SSI for -180s    dispo: CHIARA pending transition off bumex gtt, abx plan and further GOC/palliative input- guarded prognosis  d/w and agreed on by pt and wife at bedside.
D/w pt and wife extensively the plan of care 4/7  Pending PT eval today   Pt would benefit from [facility]/LTC as pt is deconditioned to improve strength, mobility, functionality
BPH - C/w tamsulosin 0.4mg QHS  DVT PPx: Xarelto  Code status: DNR/DNI w/ [facility]  palliative discussion had re: ICD- wife would like to keep it on for now    dispo: CHIARA pending euvolemia and transition off bumex gtt, stable BP, anticipate early next week per HF  plan d/w and agreed on by pt and wife at bedside.
- On Lantus 15u QHS + Trulicity weekly at home, patient states he wouldn't take it regularly  - c/w basal bolus + SSI for -180s    dispo: CHIARA pending transition off bumex gtt  plan d/w and agreed on by pt and wife at bedside
BPH - C/w tamsulosin 0.4mg QHS  DVT PPx: Xarelto  Code status: DNR/DNI w/ NIV per palliative GOC with wife at [facility], [facility] in physical chart  palliative discussion had re: ICD- wife would like to keep it on for now    dispo: CHIARA pending improvement in fluid status, renal function
- Atorvastatin 20mg QHS in place of home simvastatin 40mg QHS
- Takes Ramipril 2.5mg daily and lasix 40mg BID  - DC ramipril with transition to entresto once renal function improves.
#Melena  - Hb was 6.5 on admission to [facility]  - Patient apparently had some melena at home, intermittent NSAID use  - GI consulted at [facility], recommended pantoprazole 40mg BID and holding off endoscopy for now due to his respiratory status

## 2025-04-13 NOTE — PROGRESS NOTE ADULT - PROBLEM SELECTOR PROBLEM 9
HTN (hypertension)
Chronic hypoxic respiratory failure
Epistaxis
HTN (hypertension)
Anemia
COPD, severe
HTN (hypertension)
HTN (hypertension)
COPD, severe
Epistaxis
HTN (hypertension)
HTN (hypertension)
Epistaxis
HTN (hypertension)
Anemia
Epistaxis
Epistaxis
HTN (hypertension)
Chronic atrial fibrillation
Epistaxis
Chronic atrial fibrillation
HTN (hypertension)
HTN (hypertension)
Anemia
Chronic atrial fibrillation
Anemia
HTN (hypertension)
HTN (hypertension)
Chronic atrial fibrillation
COPD, severe
Chronic atrial fibrillation
Chronic atrial fibrillation
HTN (hypertension)
Anemia
HTN (hypertension)
HTN (hypertension)
COPD, severe
Chronic hypoxic respiratory failure
HTN (hypertension)
Epistaxis
HTN (hypertension)
Anemia
Anemia
Chronic hypoxic respiratory failure
COPD, severe
Anemia
Anemia
Epistaxis
Anemia
HTN (hypertension)
Chronic hypoxic respiratory failure
HTN (hypertension)
Anemia
Anemia
Chronic hypoxic respiratory failure
Epistaxis
COPD, severe
COPD, severe
Chronic hypoxic respiratory failure
HTN (hypertension)
Chronic atrial fibrillation
HTN (hypertension)
Anemia
COPD, severe

## 2025-04-13 NOTE — PROGRESS NOTE ADULT - SUBJECTIVE AND OBJECTIVE BOX
[facility]  Aashish Moya MD  Available via MS Teams    SUBJECTIVE / OVERNIGHT EVENTS:    ADDITIONAL REVIEW OF SYSTEMS:    MEDICATIONS  (STANDING):  albuterol/ipratropium for Nebulization 3 milliLiter(s) Nebulizer every 6 hours  allopurinol 100 milliGRAM(s) Oral daily  apixaban 2.5 milliGRAM(s) Oral two times a day  aspirin enteric coated 81 milliGRAM(s) Oral daily  atorvastatin 20 milliGRAM(s) Oral at bedtime  buMETAnide Injectable 2 milliGRAM(s) IV Push every 12 hours  calcium acetate 667 milliGRAM(s) Oral three times a day with meals  chlorhexidine 2% Cloths 1 Application(s) Topical daily  chlorhexidine 4% Liquid 1 Application(s) Topical <User Schedule>  dextrose 5%. 1000 milliLiter(s) (50 mL/Hr) IV Continuous <Continuous>  dextrose 5%. 1000 milliLiter(s) (100 mL/Hr) IV Continuous <Continuous>  dextrose 50% Injectable 25 Gram(s) IV Push once  dextrose 50% Injectable 12.5 Gram(s) IV Push once  dextrose 50% Injectable 25 Gram(s) IV Push once  epoetin lashawn (EPOGEN) Injectable 23380 Unit(s) IV Push <User Schedule>  fluticasone propionate/ salmeterol 250-50 MICROgram(s) Diskus 1 Dose(s) Inhalation two times a day  folic acid 1 milliGRAM(s) Oral daily  glucagon  Injectable 1 milliGRAM(s) IntraMuscular once  insulin glargine Injectable (LANTUS) 18 Unit(s) SubCutaneous at bedtime  insulin lispro (ADMELOG) corrective regimen sliding scale   SubCutaneous three times a day before meals  insulin lispro Injectable (ADMELOG) 3 Unit(s) SubCutaneous three times a day before meals  metoprolol succinate ER 25 milliGRAM(s) Oral daily  pantoprazole    Tablet 40 milliGRAM(s) Oral every 12 hours  polyethylene glycol 3350 17 Gram(s) Oral two times a day  psyllium Powder 1 Packet(s) Oral daily  sodium chloride 0.65% Nasal 1 Spray(s) Both Nostrils two times a day  tamsulosin 0.4 milliGRAM(s) Oral at bedtime  tiotropium 2.5 MICROgram(s) Inhaler 2 Puff(s) Inhalation daily    MEDICATIONS  (PRN):  acetaminophen     Tablet .. 650 milliGRAM(s) Oral every 6 hours PRN Temp greater or equal to 38C (100.4F), Mild Pain (1 - 3)  bisacodyl 5 milliGRAM(s) Oral every 12 hours PRN Constipation  dextrose Oral Gel 15 Gram(s) Oral once PRN Blood Glucose LESS THAN 70 milliGRAM(s)/deciliter  dextrose Oral Gel 15 Gram(s) Oral once PRN Blood Glucose LESS THAN 70 milliGRAM(s)/deciliter  melatonin 3 milliGRAM(s) Oral at bedtime PRN Insomnia  ondansetron Injectable 4 milliGRAM(s) IV Push once PRN Nausea and/or Vomiting  ondansetron Injectable 4 milliGRAM(s) IV Push every 6 hours PRN Nausea and/or Vomiting  simethicone 80 milliGRAM(s) Chew every 8 hours PRN Indigestion  sodium chloride 0.9% lock flush 10 milliLiter(s) IV Push every 1 hour PRN Pre/post blood products, medications, blood draw, and to maintain line patency      I&O's Summary    12 Apr 2025 07:01  -  13 Apr 2025 07:00  --------------------------------------------------------  IN: 240 mL / OUT: 2000 mL / NET: -1760 mL        PHYSICAL EXAM:  Vital Signs Last 24 Hrs  T(C): 36.5 (13 Apr 2025 03:51), Max: 36.7 (12 Apr 2025 20:05)  T(F): 97.7 (13 Apr 2025 03:51), Max: 98 (12 Apr 2025 20:05)  HR: 66 (13 Apr 2025 04:56) (66 - 78)  BP: 94/65 (13 Apr 2025 03:51) (93/54 - 106/69)  BP(mean): --  RR: 18 (13 Apr 2025 03:51) (18 - 18)  SpO2: 98% (13 Apr 2025 04:56) (96% - 100%)    Parameters below as of 13 Apr 2025 03:51  Patient On (Oxygen Delivery Method): nasal cannula  O2 Flow (L/min): 3    GENERAL: NAD, well-developed, obese   HEAD:  Atraumatic, Normocephalic  EYES:  conjunctiva and sclera clear  CHEST/LUNG: +crackels bases   HEART: Regular rate and rhythm; S1S2  ABDOMEN: Soft, Nontender, Nondistended; Bowel sounds present  EXTREMITIES:  anasarca LUE swelling >RUE  PSYCH: AAOx3      LABS:                        10.7   12.90 )-----------( 158      ( 13 Apr 2025 06:25 )             38.1     04-13    135  |  97  |  43[H]  ----------------------------<  73  4.4   |  22  |  4.63[H]    Ca    8.7      13 Apr 2025 06:25  Phos  4.4     04-13  Mg     2.5     04-13            Urinalysis Basic - ( 13 Apr 2025 06:25 )    Color: x / Appearance: x / SG: x / pH: x  Gluc: 73 mg/dL / Ketone: x  / Bili: x / Urobili: x   Blood: x / Protein: x / Nitrite: x   Leuk Esterase: x / RBC: x / WBC x   Sq Epi: x / Non Sq Epi: x / Bacteria: x        COVID-19 PCR: [facility] (25 Mar 2025 14:00)  SARS-CoV-2: [facility] (10 Tay 2025 19:29)      RADIOLOGY & ADDITIONAL TESTS: Reviewed [facility] of Hospital Medicine  Aashish Moya MD  Available via MS Teams    SUBJECTIVE / OVERNIGHT EVENTS:  Patient denies any complaints overnight. The patient denies any fevers, chills, nausea, vomiting, or increased pain.    MEDICATIONS  (STANDING):  albuterol/ipratropium for Nebulization 3 milliLiter(s) Nebulizer every 6 hours  allopurinol 100 milliGRAM(s) Oral daily  apixaban 2.5 milliGRAM(s) Oral two times a day  aspirin enteric coated 81 milliGRAM(s) Oral daily  atorvastatin 20 milliGRAM(s) Oral at bedtime  buMETAnide Injectable 2 milliGRAM(s) IV Push every 12 hours  calcium acetate 667 milliGRAM(s) Oral three times a day with meals  chlorhexidine 2% Cloths 1 Application(s) Topical daily  chlorhexidine 4% Liquid 1 Application(s) Topical <User Schedule>  dextrose 5%. 1000 milliLiter(s) (50 mL/Hr) IV Continuous <Continuous>  dextrose 5%. 1000 milliLiter(s) (100 mL/Hr) IV Continuous <Continuous>  dextrose 50% Injectable 25 Gram(s) IV Push once  dextrose 50% Injectable 12.5 Gram(s) IV Push once  dextrose 50% Injectable 25 Gram(s) IV Push once  epoetin lashawn (EPOGEN) Injectable 58180 Unit(s) IV Push <User Schedule>  fluticasone propionate/ salmeterol 250-50 MICROgram(s) Diskus 1 Dose(s) Inhalation two times a day  folic acid 1 milliGRAM(s) Oral daily  glucagon  Injectable 1 milliGRAM(s) IntraMuscular once  insulin glargine Injectable (LANTUS) 18 Unit(s) SubCutaneous at bedtime  insulin lispro (ADMELOG) corrective regimen sliding scale   SubCutaneous three times a day before meals  insulin lispro Injectable (ADMELOG) 3 Unit(s) SubCutaneous three times a day before meals  metoprolol succinate ER 25 milliGRAM(s) Oral daily  pantoprazole    Tablet 40 milliGRAM(s) Oral every 12 hours  polyethylene glycol 3350 17 Gram(s) Oral two times a day  psyllium Powder 1 Packet(s) Oral daily  sodium chloride 0.65% Nasal 1 Spray(s) Both Nostrils two times a day  tamsulosin 0.4 milliGRAM(s) Oral at bedtime  tiotropium 2.5 MICROgram(s) Inhaler 2 Puff(s) Inhalation daily    MEDICATIONS  (PRN):  acetaminophen     Tablet .. 650 milliGRAM(s) Oral every 6 hours PRN Temp greater or equal to 38C (100.4F), Mild Pain (1 - 3)  bisacodyl 5 milliGRAM(s) Oral every 12 hours PRN Constipation  dextrose Oral Gel 15 Gram(s) Oral once PRN Blood Glucose LESS THAN 70 milliGRAM(s)/deciliter  dextrose Oral Gel 15 Gram(s) Oral once PRN Blood Glucose LESS THAN 70 milliGRAM(s)/deciliter  melatonin 3 milliGRAM(s) Oral at bedtime PRN Insomnia  ondansetron Injectable 4 milliGRAM(s) IV Push once PRN Nausea and/or Vomiting  ondansetron Injectable 4 milliGRAM(s) IV Push every 6 hours PRN Nausea and/or Vomiting  simethicone 80 milliGRAM(s) Chew every 8 hours PRN Indigestion  sodium chloride 0.9% lock flush 10 milliLiter(s) IV Push every 1 hour PRN Pre/post blood products, medications, blood draw, and to maintain line patency      I&O's Summary    12 Apr 2025 07:01  -  13 Apr 2025 07:00  --------------------------------------------------------  IN: 240 mL / OUT: 2000 mL / NET: -1760 mL        PHYSICAL EXAM:  Vital Signs Last 24 Hrs  T(C): 36.5 (13 Apr 2025 03:51), Max: 36.7 (12 Apr 2025 20:05)  T(F): 97.7 (13 Apr 2025 03:51), Max: 98 (12 Apr 2025 20:05)  HR: 66 (13 Apr 2025 04:56) (66 - 78)  BP: 94/65 (13 Apr 2025 03:51) (93/54 - 106/69)  BP(mean): --  RR: 18 (13 Apr 2025 03:51) (18 - 18)  SpO2: 98% (13 Apr 2025 04:56) (96% - 100%)    Parameters below as of 13 Apr 2025 03:51  Patient On (Oxygen Delivery Method): nasal cannula  O2 Flow (L/min): 3    GENERAL: NAD, well-developed, obese   HEAD:  Atraumatic, Normocephalic  EYES:  conjunctiva and sclera clear  CHEST/LUNG: +crackles bases   HEART: Regular rate and rhythm; S1S2  ABDOMEN: Soft, Nontender, Nondistended; Bowel sounds present  EXTREMITIES:  anasarca LUE swelling >RUE  PSYCH: AAOx3      LABS:                        10.7   12.90 )-----------( 158      ( 13 Apr 2025 06:25 )             38.1     04-13    135  |  97  |  43[H]  ----------------------------<  73  4.4   |  22  |  4.63[H]    Ca    8.7      13 Apr 2025 06:25  Phos  4.4     04-13  Mg     2.5     04-13            Urinalysis Basic - ( 13 Apr 2025 06:25 )    Color: x / Appearance: x / SG: x / pH: x  Gluc: 73 mg/dL / Ketone: x  / Bili: x / Urobili: x   Blood: x / Protein: x / Nitrite: x   Leuk Esterase: x / RBC: x / WBC x   Sq Epi: x / Non Sq Epi: x / Bacteria: x        COVID-19 PCR: [facility] (25 Mar 2025 14:00)  SARS-CoV-2: [facility] (10 Tay 2025 19:29)      RADIOLOGY & ADDITIONAL TESTS: Reviewed

## 2025-04-13 NOTE — PROGRESS NOTE ADULT - TIME-BASED BILLING (NON-CRITICAL CARE)
Time-based billing (NON-critical care)

## 2025-04-13 NOTE — PROGRESS NOTE ADULT - TIME BILLING
Time spent on review of vitals, physical exam, documentation, and discussion of plan of care with patient, consultants and multidisciplinary team.
- Reviewing, and interpreting labs and testing.  - Independently obtaining a review of systems and performing a physical exam  - Reviewing consultant documentation/recommendations in addition to discussing plan of care with consultants.  - Counselling and educating patient and family regarding interpretation of aforementioned items and plan of care.  - This excludes time spent teaching residents/medical students.
- Ordering, reviewing, and interpreting labs, testing, and imaging.  - Independently obtaining a review of systems and performing a physical exam  - Reviewing consultant documentation/recommendations in addition to discussing plan of care with consultants.  - Counselling and educating patient and family regarding interpretation of aforementioned items and plan of care.
- Ordering, reviewing, and interpreting labs, testing, and imaging.  - Independently obtaining a review of systems and performing a physical exam  - Reviewing consultant documentation/recommendations in addition to discussing plan of care with consultants.  - Counselling and educating patient and family regarding interpretation of aforementioned items and plan of care.
patient encounter, reviewing tests and imaging, independently obtaining a history, performing a physical examination, discussing the plan with the patient, ordering medications/tests, documenting clinical information, and coordinating care. This excludes any time spent on teaching.
- Review of chart and consultation notes  - Exam and discussion with patient  - Review of laboratory and imaging   - Establishing a care plan for patient  - Communication with other providers
- Ordering, reviewing, and interpreting labs, testing, and imaging.  - Independently obtaining a review of systems and performing a physical exam  - Reviewing consultant documentation/recommendations in addition to discussing plan of care with consultants.  - Counselling and educating patient and family regarding interpretation of aforementioned items and plan of care.
- Reviewing, and interpreting labs and testing.  - Independently obtaining a review of systems and performing a physical exam  - Reviewing consultant documentation/recommendations in addition to discussing plan of care with consultants.  - Counselling and educating patient and family regarding interpretation of aforementioned items and plan of care.
Chart review, exam, counseling, coordination of care
Time-based billing (NON-critical care).     The necessity of the time spent during the encounter on this date of service was due to:     - Ordering, reviewing, and interpreting labs, testing, and imaging.  - Independently obtaining a review of systems and performing a physical exam  - Reviewing prior hospitalization and where necessary, outpatient records.  - Counselling and educating patient and family regarding interpretation of aforementioned items and plan of care.
- Review of chart and consultation notes  - Exam and discussion with patient  - Review of laboratory and imaging   - Establishing a care plan for patient  - Communication with other providers
- Review of chart and consultation notes  - Exam and discussion with patient  - Review of laboratory and imaging   - Establishing a care plan for patient  - Communication with other providers
- Reviewing, and interpreting labs and testing.  - Independently obtaining a review of systems and performing a physical exam  - Reviewing consultant documentation/recommendations in addition to discussing plan of care with consultants.  - Counselling and educating patient and family regarding interpretation of aforementioned items and plan of care.  - This excludes time spent teaching residents/medical students.
- Review of chart and consultation notes  - Exam and discussion with patient  - Review of laboratory and imaging   - Establishing a care plan for patient  - Communication with other providers
- Reviewing, and interpreting labs and testing.  - Independently obtaining a review of systems and performing a physical exam  - Reviewing consultant documentation/recommendations in addition to discussing plan of care with consultants.  - Counselling and educating patient and family regarding interpretation of aforementioned items and plan of care.  - This excludes time spent teaching residents/medical students.
Time spent on review of vitals, physical exam, documentation, and discussion of plan of care with patient, consultants and multidisciplinary team.
- Ordering, reviewing, and interpreting labs, testing, and imaging.  - Independently obtaining a review of systems and performing a physical exam  - Reviewing consultant documentation/recommendations in addition to discussing plan of care with consultants.  - Counselling and educating patient and family regarding interpretation of aforementioned items and plan of care.
- Reviewing, and interpreting labs and testing.  - Independently obtaining a review of systems and performing a physical exam  - Reviewing consultant documentation/recommendations in addition to discussing plan of care with consultants.  - Counselling and educating patient and family regarding interpretation of aforementioned items and plan of care.  - This excludes time spent teaching residents/medical students.
- Review of records, telemetry, vital signs and daily labs.   - General and cardiovascular physical examination.  - Generation of cardiovascular treatment plan.  - Coordination of care with primary team.
Time spent on review of vitals, physical exam, documentation, and discussion of plan of care with patient, consultants and multidisciplinary team.
This includes chart review, patient assessment, discussion and collaboration with interdisciplinary team members.
This includes chart review, patient assessment, discussion and collaboration with interdisciplinary team members.
Time-based billing (NON-critical care).     The necessity of the time spent during the encounter on this date of service was due to:     - Ordering, reviewing, and interpreting labs, testing, and imaging.  - Independently obtaining a review of systems and performing a physical exam  - Reviewing prior hospitalization and where necessary, outpatient records.  - Counselling and educating patient and family regarding interpretation of aforementioned items and plan of care.
- Ordering, reviewing, and interpreting labs, testing, and imaging.  - Independently obtaining a review of systems and performing a physical exam  - Reviewing consultant documentation/recommendations in addition to discussing plan of care with consultants.  - Counselling and educating patient and family regarding interpretation of aforementioned items and plan of care.
- Review of chart and consultation notes  - Exam and discussion with patient  - Review of laboratory and imaging   - Establishing a care plan for patient  - Communication with other providers
- Review of chart and consultation notes  - Exam and discussion with patient  - Review of laboratory and imaging   - Establishing a care plan for patient  - Communication with other providers
- Reviewing, and interpreting labs and testing.  - Independently obtaining a review of systems and performing a physical exam  - Reviewing consultant documentation/recommendations in addition to discussing plan of care with consultants.  - Counselling and educating patient and family regarding interpretation of aforementioned items and plan of care.  - This excludes time spent teaching residents/medical students.
patient encounter, reviewing tests and imaging, independently obtaining a history, performing a physical examination, discussing the plan with the patient, ordering medications/tests, documenting clinical information, and coordinating care. This excludes any time spent on teaching.
Time spent on review of vitals, physical exam, documentation, and discussion of plan of care with patient, patient family, consultants and multidisciplinary team.
- Ordering, reviewing, and interpreting labs, testing, and imaging.  - Independently obtaining a review of systems and performing a physical exam  - Reviewing consultant documentation/recommendations in addition to discussing plan of care with consultants.  - Counselling and educating patient and family regarding interpretation of aforementioned items and plan of care.
Time-based billing (NON-critical care).     The necessity of the time spent during the encounter on this date of service was due to:     - Ordering, reviewing, and interpreting labs, testing, and imaging.  - Independently obtaining a review of systems and performing a physical exam  - Reviewing prior hospitalization and where necessary, outpatient records.  - Counselling and educating patient and family regarding interpretation of aforementioned items and plan of care.
- Ordering, reviewing, and interpreting labs, testing, and imaging.  - Independently obtaining a review of systems and performing a physical exam  - Reviewing consultant documentation/recommendations in addition to discussing plan of care with consultants.  - Counselling and educating patient and family regarding interpretation of aforementioned items and plan of care.
- Ordering, reviewing, and interpreting labs, testing, and imaging.  - Independently obtaining a review of systems and performing a physical exam  - Reviewing consultant documentation/recommendations in addition to discussing plan of care with consultants.  - Counselling and educating patient and family regarding interpretation of aforementioned items and plan of care.
- Reviewing, and interpreting labs and testing.  - Independently obtaining a review of systems and performing a physical exam  - Reviewing consultant documentation/recommendations in addition to discussing plan of care with consultants.  - Counselling and educating patient and family regarding interpretation of aforementioned items and plan of care.  - This excludes time spent teaching residents/medical students.
- Ordering, reviewing, and interpreting labs, testing, and imaging.  - Independently obtaining a review of systems and performing a physical exam  - Reviewing consultant documentation/recommendations in addition to discussing plan of care with consultants.  - Counselling and educating patient and family regarding interpretation of aforementioned items and plan of care.
Time spent on review of vitals, physical exam, documentation, and discussion of plan of care with patient, consultants and multidisciplinary team.
- Ordering, reviewing, and interpreting labs, testing, and imaging.  - Independently obtaining a review of systems and performing a physical exam  - Reviewing consultant documentation/recommendations in addition to discussing plan of care with consultants.  - Counselling and educating patient and family regarding interpretation of aforementioned items and plan of care.
- Reviewing, and interpreting labs and testing.  - Independently obtaining a review of systems and performing a physical exam  - Reviewing consultant documentation/recommendations in addition to discussing plan of care with consultants.  - Counselling and educating patient and family regarding interpretation of aforementioned items and plan of care.  - This excludes time spent teaching residents/medical students.
- Ordering, reviewing, and interpreting labs, testing, and imaging.  - Independently obtaining a review of systems and performing a physical exam  - Reviewing consultant documentation/recommendations in addition to discussing plan of care with consultants.  - Counselling and educating patient and family regarding interpretation of aforementioned items and plan of care.
- Ordering, reviewing, and interpreting labs, testing, and imaging.  - Independently obtaining a review of systems and performing a physical exam  - Reviewing consultant documentation/recommendations in addition to discussing plan of care with consultants.  - Counselling and educating patient and family regarding interpretation of aforementioned items and plan of care.
Time-based billing (NON-critical care).     The necessity of the time spent during the encounter on this date of service was due to:     - Ordering, reviewing, and interpreting labs, testing, and imaging.  - Independently obtaining a review of systems and performing a physical exam  - Reviewing prior hospitalization and where necessary, outpatient records.  - Counselling and educating patient and family regarding interpretation of aforementioned items and plan of care.

## 2025-04-13 NOTE — PROGRESS NOTE ADULT - PROBLEM SELECTOR PLAN 11
#Melena  - Hb currently 9.9, was 6.5 on admission to [facility]  - Patient apparently had some melena at home, intermittent NSAID use  - GI consulted at [facility], recommended pantoprazole 40mg BID and holding off endoscopy for now due to his respiratory status
#Melena  - Hb was 6.5 on admission to [facility]  - Patient apparently had some melena at home, intermittent NSAID use  - GI consulted at [facility], recommended pantoprazole 40mg BID and holding off endoscopy for now due to his respiratory status
- Atorvastatin 20mg QHS in place of home simvastatin 40mg QHS
- On Lantus 15u QHS + Trulicity weekly at home, patient states he wouldn't take it regularly   - Had to be titrated to Lantus 20u QHS + Lispro 3u TID + ISS at S, will continue here
- On Lantus 15u QHS + Trulicity weekly at home, patient states he wouldn't take it regularly   - Had to be titrated to Lantus 32u QHS + Lispro 3u TID + ISS at S, will continue here
#Melena  - Hb was 6.5 on admission to [facility]  - Patient apparently had some melena at home, intermittent NSAID use  - GI consulted at [facility], recommended pantoprazole 40mg BID and holding off endoscopy for now due to his respiratory status
- Atorvastatin 20mg QHS in place of home simvastatin 40mg QHS
- Atorvastatin 20mg QHS in place of home simvastatin 40mg QHS
- Takes Ramipril 2.5mg daily and lasix 40mg BID  - DC ramipril with transition to entresto once renal function improves.
- Improved with packing and afrin spray  - Nasal sprays BID  - improved
- On Lantus 15u QHS + Trulicity weekly at home, patient states he wouldn't take it regularly   - Had to be titrated to Lantus 32u QHS + Lispro 3u TID + ISS at S, will continue here
- Atorvastatin 20mg QHS in place of home simvastatin 40mg QHS
#Melena  - Hb was 6.5 on admission to [facility]  - Patient apparently had some melena at home, intermittent NSAID use  - GI consulted at [facility], recommended pantoprazole 40mg BID and holding off endoscopy for now due to his respiratory status
- Takes Ramipril 2.5mg daily and lasix 40mg BID  - DC ramipril with transition to entresto once renal function improves.
- Improved with packing and afrin spray  - Nasal sprays BID  - improved
- On Lantus 15u QHS + Trulicity weekly at home, patient states he wouldn't take it regularly   - Had to be titrated to Lantus 32u QHS + Lispro 3u TID + ISS at S, will continue here
- Takes Ramipril 2.5mg daily and lasix 40mg BID  - DC ramipril with transition to entresto once renal function improves.
#Melena  - Hb currently 9.9, was 6.5 on admission to [facility]  - Patient apparently had some melena at home, intermittent NSAID use  - GI consulted at [facility], recommended pantoprazole 40mg BID and holding off endoscopy for now due to his respiratory status
#Melena  - Hb was 6.5 on admission to [facility]  - Patient apparently had some melena at home, intermittent NSAID use  - GI consulted at [facility], recommended pantoprazole 40mg BID and holding off endoscopy for now due to his respiratory status
- On Lantus 15u QHS + Trulicity weekly at home, patient states he wouldn't take it regularly   - Had to be titrated to Lantus 20u QHS + Lispro 3u TID + ISS at S, will continue here
- On Lantus 15u QHS + Trulicity weekly at home, patient states he wouldn't take it regularly   - Had to be titrated to Lantus 32u QHS + Lispro 3u TID + ISS at S, will continue here
- On Lantus 15u QHS + Trulicity weekly at home, patient states he wouldn't take it regularly   - Had to be titrated to Lantus 32u QHS + Lispro 3u TID + ISS at S, will continue here
- Atorvastatin 20mg QHS in place of home simvastatin 40mg QHS
- On Lantus 15u QHS + Trulicity weekly at home, patient states he wouldn't take it regularly  - c/w basal bolus + SSI for -180s
- Takes Ramipril 2.5mg daily and lasix 40mg BID  - DC ramipril with transition to entresto once renal function improves.
- Atorvastatin 20mg QHS in place of home simvastatin 40mg QHS
- Improved with packing and afrin spray  - Nasal sprays BID  - improved
- On Lantus 15u QHS + Trulicity weekly at home, patient states he wouldn't take it regularly   - Had to be titrated to Lantus 20u QHS + Lispro 3u TID + ISS at S, will continue here
- Improved with packing and afrin spray  - Nasal sprays BID  - improved
#Melena  - Hb was 6.5 on admission to [facility]  - Patient apparently had some melena at home, intermittent NSAID use  - GI consulted at [facility], recommended pantoprazole 40mg BID and holding off endoscopy for now due to his respiratory status
#Melena  - Hb was 6.5 on admission to [facility]  - Patient apparently had some melena at home, intermittent NSAID use  - GI consulted at [facility], recommended pantoprazole 40mg BID and holding off endoscopy for now due to his respiratory status
- On Lantus 15u QHS + Trulicity weekly at home, patient states he wouldn't take it regularly   - Had to be titrated to Lantus 20u QHS + Lispro 3u TID + ISS at S, will continue here
- Atorvastatin 20mg QHS in place of home simvastatin 40mg QHS
- Improved with packing and afrin spray  - Nasal sprays BID  - improved
- On Lantus 15u QHS + Trulicity weekly at home, patient states he wouldn't take it regularly   - Had to be titrated to Lantus 32u QHS + Lispro 3u TID + ISS at S, will continue here
- Atorvastatin 20mg QHS in place of home simvastatin 40mg QHS
- On Lantus 15u QHS + Trulicity weekly at home, patient states he wouldn't take it regularly   - Had to be titrated to Lantus 32u QHS + Lispro 3u TID + ISS at S, will continue here
- Takes Ramipril 2.5mg daily and lasix 40mg BID  - DC ramipril with transition to entresto once renal function improves.
#Melena  - Hb was 6.5 on admission to [facility]  - Patient apparently had some melena at home, intermittent NSAID use  - GI consulted at [facility], recommended pantoprazole 40mg BID and holding off endoscopy for now due to his respiratory status
- On Lantus 15u QHS + Trulicity weekly at home, patient states he wouldn't take it regularly   - Had to be titrated to Lantus 20u QHS + Lispro 3u TID + ISS at S, will continue here
- On Lantus 15u QHS + Trulicity weekly at home, patient states he wouldn't take it regularly   - Had to be titrated to Lantus 32u QHS + Lispro 3u TID + ISS at S, will continue here
- On Lantus 15u QHS + Trulicity weekly at home, patient states he wouldn't take it regularly   - Had to be titrated to Lantus 32u QHS + Lispro 3u TID + ISS at S, will continue here
#Melena  - Hb was 6.5 on admission to [facility]  - Patient apparently had some melena at home, intermittent NSAID use  - GI consulted at [facility], recommended pantoprazole 40mg BID and holding off endoscopy for now due to his respiratory status
- Improved with packing and afrin spray  - Nasal sprays BID  - improved
- On Lantus 15u QHS + Trulicity weekly at home, patient states he wouldn't take it regularly   - Had to be titrated to Lantus 32u QHS + Lispro 3u TID + ISS at S, will continue here
- Takes Ramipril 2.5mg daily and lasix 40mg BID  - DC ramipril with transition to entresto once renal function improves.
- Atorvastatin 20mg QHS in place of home simvastatin 40mg QHS
- Improved with packing and afrin spray  - Nasal sprays BID  - improved
- Atorvastatin 20mg QHS in place of home simvastatin 40mg QHS
- Atorvastatin 20mg QHS in place of home simvastatin 40mg QHS
- On Lantus 15u QHS + Trulicity weekly at home, patient states he wouldn't take it regularly   - Had to be titrated to Lantus 32u QHS + Lispro 3u TID + ISS at S, will continue here
#Melena  - Hb was 6.5 on admission to [facility]  - Patient apparently had some melena at home, intermittent NSAID use  - GI consulted at [facility], recommended pantoprazole 40mg BID and holding off endoscopy for now due to his respiratory status
#Melena  - Hb was 6.5 on admission to [facility]  - Patient apparently had some melena at home, intermittent NSAID use  - GI consulted at [facility], recommended pantoprazole 40mg BID and holding off endoscopy for now due to his respiratory status
- Atorvastatin 20mg QHS in place of home simvastatin 40mg QHS
- Takes Ramipril 2.5mg daily and lasix 40mg BID  - DC ramipril with transition to entresto once renal function improves.
#Melena  - Hb currently 9.9, was 6.5 on admission to [facility]  - Patient apparently had some melena at home, intermittent NSAID use  - GI consulted at [facility], recommended pantoprazole 40mg BID and holding off endoscopy for now due to his respiratory status
- Atorvastatin 20mg QHS in place of home simvastatin 40mg QHS
- Atorvastatin 20mg QHS in place of home simvastatin 40mg QHS
- On Lantus 15u QHS + Trulicity weekly at home, patient states he wouldn't take it regularly   - Had to be titrated to Lantus 20u QHS + Lispro 3u TID + ISS at S, will continue here
- Takes Ramipril 2.5mg daily and lasix 40mg BID  - DC ramipril with transition to entresto once renal function improves.
- Takes Ramipril 2.5mg daily and lasix 40mg BID  - DC ramipril with transition to entresto once renal function improves.
- Atorvastatin 20mg QHS in place of home simvastatin 40mg QHS
- Atorvastatin 20mg QHS in place of home simvastatin 40mg QHS
- Improved with packing and afrin spray  - Nasal sprays BID  - improved

## 2025-04-13 NOTE — PROGRESS NOTE ADULT - PROBLEM SELECTOR PLAN 9
#Melena  - Hb currently 9.9, was 6.5 on admission to [facility]  - Patient apparently had some melena at home, intermittent NSAID use  - GI consulted at [facility], recommended pantoprazole 40mg BID and holding off endoscopy for now due to his respiratory status
- On Symbicort 160-4.5 2 puffs BID + Spiriva + Duonebs PRN at home  - Advair BID + Spiriva + Duonebs q6hrs PRN for SOB/wheezing while admitted  - On 3-4LNC at home
- Multiple episodes of epistaxis this admission, on ASA and Eliquis. Hgb stable. Improved with packing and afrin spray  - Nasal sprays BID  - improved
- Takes Ramipril 2.5mg daily and lasix 40mg BID  - Hold ramipril in setting of DENISHA on CKD
- ECG at [facility] personally reviewed, V-paced rhythm seen  - resumed eliquis (instead of xarelto as he is now on HD)
#Melena  - Hb currently 9.9, was 6.5 on admission to [facility]  - Patient apparently had some melena at home, intermittent NSAID use  - GI consulted at [facility], recommended pantoprazole 40mg BID and holding off endoscopy for now due to his respiratory status
- Takes Ramipril 2.5mg daily and lasix 40mg BID  - Hold ramipril in setting of DENISHA on CKD
- Takes Ramipril 2.5mg daily and lasix 40mg BID  - Hold ramipril in setting of DENISHA on CKD
Multiple episodes of epistaxis this admission, on ASA and Eliquis. Hgb stable. Improved with packing and afrin spray  - Nasal sprays BID  - improved
Multiple episodes of epistaxis this admission, on ASA and Eliquis. Hgb stable. Improved with packing and afrin spray  - Nasal sprays BID  - improved
#Melena  - Hb currently 9.9, was 6.5 on admission to [facility]  - Patient apparently had some melena at home, intermittent NSAID use  - GI consulted at [facility], recommended pantoprazole 40mg BID and holding off endoscopy for now due to his respiratory status
- Hb currently 9.9, was 6.5 on admission to [facility]  - Patient apparently had some melena at home, intermittent NSAID use  - GI consulted at [facility], recommended pantoprazole 40mg BID and holding off endoscopy for now due to his respiratory status
#Melena  - Hb currently 9.9, was 6.5 on admission to [facility]  - Patient apparently had some melena at home, intermittent NSAID use  - GI consulted at [facility], recommended pantoprazole 40mg BID and holding off endoscopy for now due to his respiratory status
- On 3-4LNC chronically at home due to COPD  - Back to baseline, titrate O2 as needed to maintain SpO2 88-92%
- Takes Ramipril 2.5mg daily and lasix 40mg BID  - Hold ramipril in setting of DENISHA on CKD
- On Symbicort 160-4.5 2 puffs BID + Spiriva + Duonebs PRN at home  - Advair BID + Spiriva + Duonebs q6hrs PRN for SOB/wheezing while admitted  - On 3-4LNC at home
- Takes Ramipril 2.5mg daily and lasix 40mg BID  - Hold ramipril in setting of DENISHA on CKD
Multiple episodes of epistaxis this admission, on ASA and Eliquis. Hgb stable. Improved with packing and afrin spray  - Nasal sprays BID  - improved
- On 3-4LNC chronically at home due to COPD  - Back to baseline, titrate O2 as needed to maintain SpO2 88-92%
- ECG at [facility] personally reviewed, V-paced rhythm seen  - resumed eliquis (instead of xarelto as he is now on HD)
#Melena  - Hb currently 9.9, was 6.5 on admission to [facility]  - Patient apparently had some melena at home, intermittent NSAID use  - GI consulted at [facility], recommended pantoprazole 40mg BID and holding off endoscopy for now due to his respiratory status
- ECG at [facility] personally reviewed, V-paced rhythm seen  - resumed eliquis (instead of xarelto as he is now on HD)
- Takes Ramipril 2.5mg daily and lasix 40mg BID  - Hold ramipril in setting of DENISHA on CKD
Multiple episodes of epistaxis this admission, on ASA and Eliquis. Hgb stable. Improved with packing and afrin spray  - Nasal sprays BID  - improved
- Multiple episodes of epistaxis this admission, on ASA and Eliquis. Hgb stable. Improved with packing and afrin spray  - Nasal sprays BID  - improved
- Takes Ramipril 2.5mg daily and lasix 40mg BID  - Hold ramipril in setting of DENISHA on CKD
#Melena  - Hb currently 9.9, was 6.5 on admission to [facility]  - Patient apparently had some melena at home, intermittent NSAID use  - GI consulted at [facility], recommended pantoprazole 40mg BID and holding off endoscopy for now due to his respiratory status
- On Symbicort 160-4.5 2 puffs BID + Spiriva + Duonebs PRN at home  - Advair BID + Spiriva + Duonebs q6hrs PRN for SOB/wheezing while admitted  - On 3-4LNC at home
- Takes Ramipril 2.5mg daily and lasix 40mg BID  - Hold ramipril in setting of DENISHA on CKD
Multiple episodes of epistaxis this admission, on ASA and Eliquis. Hgb stable. Improved with packing and afrin spray  - Nasal sprays BID  - improved
- ECG at [facility] personally reviewed, V-paced rhythm seen  - resumed eliquis (instead of xarelto as he is now on HD)
- Hb currently 9.9, was 6.5 on admission to [facility]  - Patient apparently had some melena at home, intermittent NSAID use  - GI consulted at [facility], recommended pantoprazole 40mg BID and holding off endoscopy for now due to his respiratory status  - Unsure why never started, will order today.
- On Symbicort 160-4.5 2 puffs BID + Spiriva + Duonebs PRN at home  - Advair BID + Spiriva + Duonebs q6hrs PRN for SOB/wheezing while admitted  - On 3-4LNC at home
- Takes Ramipril 2.5mg daily and lasix 40mg BID  - Hold ramipril in setting of DENISHA on CKD
- Takes Ramipril 2.5mg daily and lasix 40mg BID  - Hold ramipril in setting of DENISHA on CKD
#Melena  - Hb currently 9.9, was 6.5 on admission to [facility]  - Patient apparently had some melena at home, intermittent NSAID use  - GI consulted at [facility], recommended pantoprazole 40mg BID and holding off endoscopy for now due to his respiratory status
Multiple episodes of epistaxis this admission, on ASA and Eliquis. Hgb stable. Improved with packing and afrin spray  - Nasal sprays BID  - improved
- On Symbicort 160-4.5 2 puffs BID + Spiriva + Duonebs PRN at home  - Advair BID + Spiriva + Duonebs q6hrs PRN for SOB/wheezing while admitted  - On 3-4LNC at home
#Melena  - Hb currently 9.9, was 6.5 on admission to [facility]  - Patient apparently had some melena at home, intermittent NSAID use  - GI consulted at [facility], recommended pantoprazole 40mg BID and holding off endoscopy for now due to his respiratory status
- ECG at [facility] personally reviewed, V-paced rhythm seen  - resumed eliquis (instead of xarelto as he is now on HD)
- On 3-4LNC chronically at home due to COPD  - Back to baseline, titrate O2 as needed to maintain SpO2 88-92%
#Melena  - Hb currently 9.9, was 6.5 on admission to [facility]  - Patient apparently had some melena at home, intermittent NSAID use  - GI consulted at [facility], recommended pantoprazole 40mg BID and holding off endoscopy for now due to his respiratory status
#Melena  - Hb currently 9.9, was 6.5 on admission to [facility]  - Patient apparently had some melena at home, intermittent NSAID use  - GI consulted at [facility], recommended pantoprazole 40mg BID and holding off endoscopy for now due to his respiratory status
- ECG at [facility] personally reviewed, V-paced rhythm seen  - resumed eliquis (instead of xarelto as he is now on HD)
- Takes Ramipril 2.5mg daily and lasix 40mg BID  - Hold ramipril in setting of DENISHA on CKD
- Hb currently 9.9, was 6.5 on admission to [facility]  - Patient apparently had some melena at home, intermittent NSAID use  - GI consulted at [facility], recommended pantoprazole 40mg BID and holding off endoscopy for now due to his respiratory status
- On 3-4LNC chronically at home due to COPD  - Back to baseline, titrate O2 as needed to maintain SpO2 88-92%
- On Symbicort 160-4.5 2 puffs BID + Spiriva + Duonebs PRN at home  - Advair BID + Spiriva + Duonebs q6hrs PRN for SOB/wheezing while admitted  - On 3-4LNC at home
- Takes Ramipril 2.5mg daily and lasix 40mg BID  - Hold ramipril in setting of DENISHA on CKD
Multiple episodes of epistaxis this admission, on ASA and Eliquis. Hgb stable. Improved with packing and afrin spray  - Nasal sprays BID  - improved
#Melena  - Hb currently 9.9, was 6.5 on admission to [facility]  - Patient apparently had some melena at home, intermittent NSAID use  - GI consulted at [facility], recommended pantoprazole 40mg BID and holding off endoscopy for now due to his respiratory status
#Melena  - Hb currently 9.9, was 6.5 on admission to [facility]  - Patient apparently had some melena at home, intermittent NSAID use  - GI consulted at [facility], recommended pantoprazole 40mg BID and holding off endoscopy for now due to his respiratory status
#Melena, resolved  - Hb currently 9.9, was 6.5 on admission to [facility]  - Patient apparently had some melena at home, intermittent NSAID use  - GI consulted at [facility], recommended pantoprazole 40mg BID and holding off endoscopy for now due to his respiratory status
- On Symbicort 160-4.5 2 puffs BID + Spiriva + Duonebs PRN at home  - Advair BID + Spiriva + Duonebs q6hrs PRN for SOB/wheezing while admitted  - On 3-4LNC at home
- Takes Ramipril 2.5mg daily and lasix 40mg BID  - Hold ramipril in setting of DENISHA on CKD
- Takes Ramipril 2.5mg daily and lasix 40mg BID  - Hold ramipril in setting of DENISHA on CKD
- On 3-4LNC chronically at home due to COPD  - Back to baseline, titrate O2 as needed to maintain SpO2 88-92%
- On Symbicort 160-4.5 2 puffs BID + Spiriva + Duonebs PRN at home  - Advair BID + Spiriva + Duonebs q6hrs PRN for SOB/wheezing while admitted  - On 3-4LNC at home
- Takes Ramipril 2.5mg daily and lasix 40mg BID  - Hold ramipril in setting of DENISHA on CKD
- Takes Ramipril 2.5mg daily and lasix 40mg BID  - Hold ramipril in setting of DENISHA on CKD
- On 3-4LNC chronically at home due to COPD  - Back to baseline, titrate O2 as needed to maintain SpO2 88-92%
- Takes Ramipril 2.5mg daily and lasix 40mg BID  - Hold ramipril in setting of DENISHA on CKD
- ECG at [facility] personally reviewed, V-paced rhythm seen  - resumed eliquis (instead of xarelto as he is now on HD)

## 2025-04-13 NOTE — PROGRESS NOTE ADULT - PROBLEM SELECTOR PLAN 7
- On 3-4LNC chronically at home due to COPD  - Back to baseline, titrate O2 as needed to maintain SpO2 88-92%
- On 3-4LNC chronically at home due to COPD  - Back to baseline, titrate O2 as needed to maintain SpO2 88-92%
- On Symbicort 160-4.5 2 puffs BID + Spiriva + Duonebs PRN at home  - Advair BID + Spiriva + Duonebs q6hrs PRN for SOB/wheezing while admitted  - On 3-4LNC at home
- On 3-4LNC chronically at home due to COPD  - Back to baseline, titrate O2 as needed to maintain SpO2 88-92%
- US doppler done for LUE swelling at S found non-occlusive thrombus within the brachial and basilic vein in Jan 2025  -repeat [facility] duplex LUE 2/7- resolved thrombus  -LUE doppler 4/8 neagtive for DVT
- On 3-4LNC chronically at home due to COPD  - Back to baseline, titrate O2 as needed to maintain SpO2 88-92%
- On 3-4LNC chronically at home due to COPD  - Back to baseline, titrate O2 as needed to maintain SpO2 88-92%
S/p CABG
- On 3-4LNC chronically at home due to COPD  - Back to baseline, titrate O2 as needed to maintain SpO2 88-92%
S/p CABG
- On 3-4LNC chronically at home due to COPD  - Back to baseline, titrate O2 as needed to maintain SpO2 88-92%
- ECG at [facility] personally reviewed, V-paced rhythm seen  - holding Xarelto 15mg daily for now
- ECG at [facility] personally reviewed, V-paced rhythm seen  - holding Xarelto 15mg daily for now
- On 3-4LNC chronically at home due to COPD  - Back to baseline, titrate O2 as needed to maintain SpO2 88-92%
S/p CABG
- On 3-4LNC chronically at home due to COPD  - Back to baseline, titrate O2 as needed to maintain SpO2 88-92%
- On Symbicort 160-4.5 2 puffs BID + Spiriva + Duonebs PRN at home  - Advair BID + Spiriva + Duonebs q6hrs PRN for SOB/wheezing while admitted  - On 3-4LNC at home
- US doppler done for LUE swelling at S found non-occlusive thrombus within the brachial and basilic vein in Jan 2025  -repeat VA duplex LUE 2/7- resolved thrombus  -LUE doppler 4/8 neagtive for DVT
- On 3-4LNC chronically at home due to COPD  - Back to baseline, titrate O2 as needed to maintain SpO2 88-92%
- On 3-4LNC chronically at home due to COPD  - Back to baseline, titrate O2 as needed to maintain SpO2 88-92%
S/p CABG
- On 3-4LNC chronically at home due to COPD  - Back to baseline, titrate O2 as needed to maintain SpO2 88-92%
- On Symbicort 160-4.5 2 puffs BID + Spiriva + Duonebs PRN at home  - Advair BID + Spiriva + Duonebs q6hrs PRN for SOB/wheezing while admitted  - On 3-4LNC at home
- US doppler done for LUE swelling at S found non-occlusive thrombus within the brachial and basilic vein in Jan 2025  -repeat VA duplex LUE 2/7- resolved thrombus  -LUE more swollen today check doppler
S/p CABG
- On 3-4LNC chronically at home due to COPD  - Back to baseline, titrate O2 as needed to maintain SpO2 88-92%
- On Symbicort 160-4.5 2 puffs BID + Spiriva + Duonebs PRN at home  - Advair BID + Spiriva + Duonebs q6hrs PRN for SOB/wheezing while admitted  - On 3-4LNC at home
- On Symbicort 160-4.5 2 puffs BID + Spiriva + Duonebs PRN at home  - Advair BID + Spiriva + Duonebs q6hrs PRN for SOB/wheezing while admitted  - On 3-4LNC at home
S/p CABG
- ECG at [facility] personally reviewed, V-paced rhythm seen  - holding Xarelto 15mg daily for now
- On 3-4LNC chronically at home due to COPD  - Back to baseline, titrate O2 as needed to maintain SpO2 88-92%
- On Symbicort 160-4.5 2 puffs BID + Spiriva + Duonebs PRN at home  - Advair BID + Spiriva + Duonebs q6hrs PRN for SOB/wheezing while admitted  - On 3-4LNC at home
- On 3-4LNC chronically at home due to COPD  - Back to baseline, titrate O2 as needed to maintain SpO2 88-92%
- ECG at [facility] personally reviewed, V-paced rhythm seen  - holding Xarelto 15mg daily for now
- On 3-4LNC chronically at home due to COPD  - Back to baseline, titrate O2 as needed to maintain SpO2 88-92%
- On Symbicort 160-4.5 2 puffs BID + Spiriva + Duonebs PRN at home  - Advair BID + Spiriva + Duonebs q6hrs PRN for SOB/wheezing while admitted  - On 3-4LNC at home
- On 3-4LNC chronically at home due to COPD  - Back to baseline, titrate O2 as needed to maintain SpO2 88-92%
S/p CABG
- US doppler done for LUE swelling at S found non-occlusive thrombus within the brachial and basilic vein in Jan 2025  -repeat VA duplex LUE 2/7- resolved thrombus
- On 3-4LNC chronically at home due to COPD  - Back to baseline, titrate O2 as needed to maintain SpO2 88-92%
- On Symbicort 160-4.5 2 puffs BID + Spiriva + Duonebs PRN at home  - Advair BID + Spiriva + Duonebs q6hrs PRN for SOB/wheezing while admitted  - On 3-4LNC at home
- On 3-4LNC chronically at home due to COPD  - Back to baseline, titrate O2 as needed to maintain SpO2 88-92%
- On 3-4LNC chronically at home due to COPD  - Back to baseline, titrate O2 as needed to maintain SpO2 88-92%
- US doppler done for LUE swelling at S found non-occlusive thrombus within the brachial and basilic vein in Jan 2025  -repeat VA duplex LUE 2/7- resolved thrombus  -LUE doppler 4/8 neagtive for DVT
- ECG at [facility] personally reviewed, V-paced rhythm seen  - resumed Xarelto 15mg daily
- US doppler done for LUE swelling at S found non-occlusive thrombus within the brachial and basilic vein in Jan 2025  -repeat VA duplex LUE 2/7- resolved thrombus  -LUE doppler 4/8 neagtive for DVT
- ECG at [facility] personally reviewed, V-paced rhythm seen  - holding Xarelto 15mg daily for now
- On 3-4LNC chronically at home due to COPD  - Back to baseline, titrate O2 as needed to maintain SpO2 88-92%
S/p CABG
- US doppler done for LUE swelling at S found non-occlusive thrombus within the brachial and basilic vein in Jan 2025  -repeat VA duplex LUE 2/7- resolved thrombus  -LUE doppler 4/8 neagtive for DVT

## 2025-04-13 NOTE — PROGRESS NOTE ADULT - PROBLEM SELECTOR PROBLEM 10
HLD (hyperlipidemia)
HLD (hyperlipidemia)
Chronic hypoxic respiratory failure
HLD (hyperlipidemia)
HLD (hyperlipidemia)
HTN (hypertension)
HTN (hypertension)
Anemia
Epistaxis
HTN (hypertension)
HTN (hypertension)
Anemia
HTN (hypertension)
Epistaxis
Chronic hypoxic respiratory failure
HTN (hypertension)
Anemia
HLD (hyperlipidemia)
HTN (hypertension)
Anemia
Epistaxis
HLD (hyperlipidemia)
Chronic hypoxic respiratory failure
Anemia
Chronic hypoxic respiratory failure
Chronic hypoxic respiratory failure
Epistaxis
Anemia
HTN (hypertension)
HLD (hyperlipidemia)
Epistaxis
HLD (hyperlipidemia)
HTN (hypertension)
Epistaxis
HLD (hyperlipidemia)
HTN (hypertension)
Epistaxis
HTN (hypertension)
HTN (hypertension)
Epistaxis
HLD (hyperlipidemia)
HTN (hypertension)
HLD (hyperlipidemia)
Chronic hypoxic respiratory failure
Chronic hypoxic respiratory failure
Epistaxis
HLD (hyperlipidemia)
HLD (hyperlipidemia)
Epistaxis
HLD (hyperlipidemia)
HTN (hypertension)
HTN (hypertension)
Epistaxis
HLD (hyperlipidemia)
HTN (hypertension)
Epistaxis
Epistaxis

## 2025-04-13 NOTE — PROGRESS NOTE ADULT - PROBLEM SELECTOR PROBLEM 6
COPD, severe
COPD, severe
Chronic atrial fibrillation
Metabolic encephalopathy
Metabolic encephalopathy
Chronic atrial fibrillation
COPD, severe
DVT of upper extremity (deep vein thrombosis)
Metabolic encephalopathy
COPD, severe
Chronic atrial fibrillation
DVT of upper extremity (deep vein thrombosis)
COPD, severe
COPD, severe
DVT of upper extremity (deep vein thrombosis)
COPD, severe
CAD (coronary artery disease)
COPD, severe
COPD, severe
Metabolic encephalopathy
CAD (coronary artery disease)
COPD, severe
COPD, severe
CAD (coronary artery disease)
COPD, severe
DVT of upper extremity (deep vein thrombosis)
Chronic atrial fibrillation
COPD, severe
COPD, severe
CAD (coronary artery disease)
COPD, severe
Metabolic encephalopathy
COPD, severe
COPD, severe
Chronic atrial fibrillation
Metabolic encephalopathy
COPD, severe
DVT of upper extremity (deep vein thrombosis)
Metabolic encephalopathy
DVT of upper extremity (deep vein thrombosis)
COPD, severe
Chronic atrial fibrillation
COPD, severe
CAD (coronary artery disease)
COPD, severe
COPD, severe
Chronic atrial fibrillation
COPD, severe
Chronic atrial fibrillation
CAD (coronary artery disease)

## 2025-04-13 NOTE — PROGRESS NOTE ADULT - PROBLEM SELECTOR PROBLEM 7
COPD, severe
COPD, severe
CAD (coronary artery disease)
Chronic hypoxic respiratory failure
Chronic hypoxic respiratory failure
DVT of upper extremity (deep vein thrombosis)
Chronic hypoxic respiratory failure
CAD (coronary artery disease)
CAD (coronary artery disease)
Chronic hypoxic respiratory failure
Chronic hypoxic respiratory failure
CAD (coronary artery disease)
Chronic atrial fibrillation
COPD, severe
Chronic hypoxic respiratory failure
Chronic hypoxic respiratory failure
DVT of upper extremity (deep vein thrombosis)
Chronic hypoxic respiratory failure
COPD, severe
COPD, severe
Chronic atrial fibrillation
Chronic hypoxic respiratory failure
DVT of upper extremity (deep vein thrombosis)
COPD, severe
Chronic atrial fibrillation
Chronic hypoxic respiratory failure
COPD, severe
Chronic hypoxic respiratory failure
DVT of upper extremity (deep vein thrombosis)
CAD (coronary artery disease)
CAD (coronary artery disease)
Chronic hypoxic respiratory failure
Chronic hypoxic respiratory failure
Chronic atrial fibrillation
Chronic hypoxic respiratory failure
CAD (coronary artery disease)
Chronic hypoxic respiratory failure
Chronic hypoxic respiratory failure
DVT of upper extremity (deep vein thrombosis)
Chronic hypoxic respiratory failure
CAD (coronary artery disease)
Chronic hypoxic respiratory failure
Chronic atrial fibrillation
Chronic hypoxic respiratory failure
Chronic hypoxic respiratory failure
DVT of upper extremity (deep vein thrombosis)
Chronic hypoxic respiratory failure
Chronic hypoxic respiratory failure
DVT of upper extremity (deep vein thrombosis)
COPD, severe
Chronic hypoxic respiratory failure
COPD, severe
Chronic hypoxic respiratory failure

## 2025-04-13 NOTE — PROGRESS NOTE ADULT - PROBLEM SELECTOR PROBLEM 11
Epistaxis
Epistaxis
T2DM (type 2 diabetes mellitus)
Anemia
HLD (hyperlipidemia)
T2DM (type 2 diabetes mellitus)
HLD (hyperlipidemia)
T2DM (type 2 diabetes mellitus)
HLD (hyperlipidemia)
HTN (hypertension)
Anemia
HLD (hyperlipidemia)
HLD (hyperlipidemia)
HTN (hypertension)
HLD (hyperlipidemia)
T2DM (type 2 diabetes mellitus)
HLD (hyperlipidemia)
T2DM (type 2 diabetes mellitus)
T2DM (type 2 diabetes mellitus)
Epistaxis
T2DM (type 2 diabetes mellitus)
T2DM (type 2 diabetes mellitus)
Epistaxis
Epistaxis
HTN (hypertension)
Anemia
HLD (hyperlipidemia)
HTN (hypertension)
T2DM (type 2 diabetes mellitus)
HTN (hypertension)
Anemia
T2DM (type 2 diabetes mellitus)
T2DM (type 2 diabetes mellitus)
HTN (hypertension)
Anemia
Epistaxis
Epistaxis
T2DM (type 2 diabetes mellitus)
Anemia
HLD (hyperlipidemia)
HTN (hypertension)
HTN (hypertension)
T2DM (type 2 diabetes mellitus)
Epistaxis
HLD (hyperlipidemia)
T2DM (type 2 diabetes mellitus)
HLD (hyperlipidemia)
T2DM (type 2 diabetes mellitus)
HLD (hyperlipidemia)
T2DM (type 2 diabetes mellitus)
HTN (hypertension)
Anemia
HLD (hyperlipidemia)
T2DM (type 2 diabetes mellitus)
Anemia
Anemia
T2DM (type 2 diabetes mellitus)

## 2025-04-13 NOTE — PROGRESS NOTE ADULT - PROBLEM SELECTOR PLAN 10
- Atorvastatin 20mg QHS in place of home simvastatin 40mg QHS
- Atorvastatin 20mg QHS in place of home simvastatin 40mg QHS
- Improved with packing and afrin spray  - Nasal sprays BID  - improved
- Takes Ramipril 2.5mg daily and lasix 40mg BID  - Hold ramipril in setting of DENISHA on CKD
- Atorvastatin 20mg QHS in place of home simvastatin 40mg QHS
- Takes Ramipril 2.5mg daily and lasix 40mg BID  - DC ramipril with transition to entresto once renal function improves.
- Improved with packing and afrin spray  - Nasal sprays BID  - improved
- Atorvastatin 20mg QHS in place of home simvastatin 40mg QHS
- Takes Ramipril 2.5mg daily and lasix 40mg BID  - DC ramipril with transition to entresto once renal function improves.
- On 3-4LNC chronically at home due to COPD  - Back to baseline, titrate O2 as needed to maintain SpO2 88-92%
- Atorvastatin 20mg QHS in place of home simvastatin 40mg QHS
- Improved with packing and afrin spray  - Nasal sprays BID  - improved
- On 3-4LNC chronically at home due to COPD
- Takes Ramipril 2.5mg daily and lasix 40mg BID  - DC ramipril with transition to entresto once renal function improves.
- Takes Ramipril 2.5mg daily and lasix 40mg BID  - Hold ramipril in setting of DENISHA on CKD
#Melena  - Hb currently 9.9, was 6.5 on admission to [facility]  - Patient apparently had some melena at home, intermittent NSAID use  - GI consulted at [facility], recommended pantoprazole 40mg BID and holding off endoscopy for now due to his respiratory status
- Atorvastatin 20mg QHS in place of home simvastatin 40mg QHS
- Takes Ramipril 2.5mg daily and lasix 40mg BID  - DC ramipril with transition to entresto once renal function improves.
- Atorvastatin 20mg QHS in place of home simvastatin 40mg QHS
#Melena  - Hb currently 9.9, was 6.5 on admission to [facility]  - Patient apparently had some melena at home, intermittent NSAID use  - GI consulted at [facility], recommended pantoprazole 40mg BID and holding off endoscopy for now due to his respiratory status
- Improved with packing and afrin spray  - Nasal sprays BID  - improved
- Improved with packing and afrin spray  - Nasal sprays BID  - improved
- Atorvastatin 20mg QHS in place of home simvastatin 40mg QHS
- On 3-4LNC chronically at home due to COPD
- On 3-4LNC chronically at home due to COPD  - Back to baseline, titrate O2 as needed to maintain SpO2 88-92%
- Atorvastatin 20mg QHS in place of home simvastatin 40mg QHS
- Takes Ramipril 2.5mg daily and lasix 40mg BID  - DC ramipril with transition to entresto once renal function improves.
- Atorvastatin 20mg QHS in place of home simvastatin 40mg QHS
#Melena  - Hb currently 9.9, was 6.5 on admission to [facility]  - Patient apparently had some melena at home, intermittent NSAID use  - GI consulted at [facility], recommended pantoprazole 40mg BID and holding off endoscopy for now due to his respiratory status
#Melena  - Hb currently 9.9, was 6.5 on admission to [facility]  - Patient apparently had some melena at home, intermittent NSAID use  - GI consulted at [facility], recommended pantoprazole 40mg BID and holding off endoscopy for now due to his respiratory status
- On 3-4LNC chronically at home due to COPD
- Takes Ramipril 2.5mg daily and lasix 40mg BID  - Hold ramipril in setting of DENISHA on CKD
- Improved with packing and afrin spray  - Nasal sprays BID  - improved
- Improved with packing and afrin spray  - Nasal sprays BID  - improved
- Takes Ramipril 2.5mg daily and lasix 40mg BID  - DC ramipril with transition to entresto once renal function improves.
#Melena  - Hb currently 9.9, was 6.5 on admission to [facility]  - Patient apparently had some melena at home, intermittent NSAID use  - GI consulted at [facility], recommended pantoprazole 40mg BID and holding off endoscopy for now due to his respiratory status
#Melena  - Hb currently 9.9, was 6.5 on admission to [facility]  - Patient apparently had some melena at home, intermittent NSAID use  - GI consulted at [facility], recommended pantoprazole 40mg BID and holding off endoscopy for now due to his respiratory status
- Improved with packing and afrin spray  - Nasal sprays BID  - improved
- Atorvastatin 20mg QHS in place of home simvastatin 40mg QHS
- Improved with packing and afrin spray  - Nasal sprays BID  - improved
- Improved with packing and afrin spray  - Nasal sprays BID  - improved
- Takes Ramipril 2.5mg daily and lasix 40mg BID  - DC ramipril with transition to entresto once renal function improves.
- Atorvastatin 20mg QHS in place of home simvastatin 40mg QHS
- Takes Ramipril 2.5mg daily and lasix 40mg BID  - DC ramipril with transition to entresto once renal function improves.
- Takes Ramipril 2.5mg daily and lasix 40mg BID  - DC ramipril with transition to entresto once renal function improves.
- Takes Ramipril 2.5mg daily and lasix 40mg BID  - Hold ramipril in setting of DENISHA on CKD
- Atorvastatin 20mg QHS in place of home simvastatin 40mg QHS
- Atorvastatin 20mg QHS in place of home simvastatin 40mg QHS
- On 3-4LNC chronically at home due to COPD  - Back to baseline, titrate O2 as needed to maintain SpO2 88-92%
- Improved with packing and afrin spray  - Nasal sprays BID  - improved
- Improved with packing and afrin spray  - Nasal sprays BID  - improved
- On 3-4LNC chronically at home due to COPD  - Back to baseline, titrate O2 as needed to maintain SpO2 88-92%
- Atorvastatin 20mg QHS in place of home simvastatin 40mg QHS
- Atorvastatin 20mg QHS in place of home simvastatin 40mg QHS
- On 3-4LNC chronically at home due to COPD
#Melena  - Hb currently 9.9, was 6.5 on admission to [facility]  - Patient apparently had some melena at home, intermittent NSAID use  - GI consulted at [facility], recommended pantoprazole 40mg BID and holding off endoscopy for now due to his respiratory status
- Atorvastatin 20mg QHS in place of home simvastatin 40mg QHS
#Melena  - Hb currently 9.9, was 6.5 on admission to [facility]  - Patient apparently had some melena at home, intermittent NSAID use  - GI consulted at [facility], recommended pantoprazole 40mg BID and holding off endoscopy for now due to his respiratory status
#Melena  - Hb currently 9.9, was 6.5 on admission to [facility]  - Patient apparently had some melena at home, intermittent NSAID use  - GI consulted at [facility], recommended pantoprazole 40mg BID and holding off endoscopy for now due to his respiratory status
- Takes Ramipril 2.5mg daily and lasix 40mg BID  - DC ramipril with transition to entresto once renal function improves.
- Improved with packing and afrin spray  - Nasal sprays BID  - improved

## 2025-04-13 NOTE — PROGRESS NOTE ADULT - PROBLEM SELECTOR PROBLEM 12
HLD (hyperlipidemia)
Prophylactic measure
T2DM (type 2 diabetes mellitus)
Anemia
Prophylactic measure
T2DM (type 2 diabetes mellitus)
HTN (hypertension)
T2DM (type 2 diabetes mellitus)
Prophylactic measure
HTN (hypertension)
Prophylactic measure
Anemia
Prophylactic measure
Prophylactic measure
HLD (hyperlipidemia)
Prophylactic measure
Prophylactic measure
Anemia
HTN (hypertension)
HLD (hyperlipidemia)
Anemia
Prophylactic measure
Prophylactic measure
T2DM (type 2 diabetes mellitus)
Prophylactic measure
HLD (hyperlipidemia)
Prophylactic measure
T2DM (type 2 diabetes mellitus)
Prophylactic measure
Prophylactic measure
T2DM (type 2 diabetes mellitus)
Prophylactic measure
HLD (hyperlipidemia)
Prophylactic measure
HLD (hyperlipidemia)
Prophylactic measure
T2DM (type 2 diabetes mellitus)
Prophylactic measure
T2DM (type 2 diabetes mellitus)
HTN (hypertension)
Prophylactic measure
HTN (hypertension)
Prophylactic measure
T2DM (type 2 diabetes mellitus)
HLD (hyperlipidemia)
HLD (hyperlipidemia)
Prophylactic measure
HTN (hypertension)
Prophylactic measure
T2DM (type 2 diabetes mellitus)
T2DM (type 2 diabetes mellitus)
Anemia
Anemia
HLD (hyperlipidemia)

## 2025-04-14 ENCOUNTER — TRANSCRIPTION ENCOUNTER (OUTPATIENT)
Age: 80
End: 2025-04-14

## 2025-04-14 VITALS
TEMPERATURE: 98 F | HEART RATE: 75 BPM | OXYGEN SATURATION: 99 % | SYSTOLIC BLOOD PRESSURE: 100 MMHG | DIASTOLIC BLOOD PRESSURE: 64 MMHG | RESPIRATION RATE: 18 BRPM

## 2025-04-14 LAB
ALBUMIN SERPL ELPH-MCNC: 2.7 G/DL — LOW (ref 3.3–5)
ALP SERPL-CCNC: 290 U/L — HIGH (ref 40–120)
ALT FLD-CCNC: 7 U/L — LOW (ref 10–45)
ANION GAP SERPL CALC-SCNC: 15 MMOL/L — SIGNIFICANT CHANGE UP (ref 5–17)
ANISOCYTOSIS BLD QL: SIGNIFICANT CHANGE UP
AST SERPL-CCNC: 32 U/L — SIGNIFICANT CHANGE UP (ref 10–40)
BASOPHILS # BLD AUTO: 0 K/UL — SIGNIFICANT CHANGE UP (ref 0–0.2)
BASOPHILS NFR BLD AUTO: 0 % — SIGNIFICANT CHANGE UP (ref 0–2)
BILIRUB SERPL-MCNC: 0.6 MG/DL — SIGNIFICANT CHANGE UP (ref 0.2–1.2)
BUN SERPL-MCNC: 49 MG/DL — HIGH (ref 7–23)
BURR CELLS BLD QL SMEAR: PRESENT — SIGNIFICANT CHANGE UP
CALCIUM SERPL-MCNC: 8.5 MG/DL — SIGNIFICANT CHANGE UP (ref 8.4–10.5)
CHLORIDE SERPL-SCNC: 96 MMOL/L — SIGNIFICANT CHANGE UP (ref 96–108)
CO2 SERPL-SCNC: 21 MMOL/L — LOW (ref 22–31)
CREAT SERPL-MCNC: 5.11 MG/DL — HIGH (ref 0.5–1.3)
DACRYOCYTES BLD QL SMEAR: SLIGHT — SIGNIFICANT CHANGE UP
EGFR: 11 ML/MIN/1.73M2 — LOW
EGFR: 11 ML/MIN/1.73M2 — LOW
EOSINOPHIL # BLD AUTO: 0 K/UL — SIGNIFICANT CHANGE UP (ref 0–0.5)
EOSINOPHIL NFR BLD AUTO: 0 % — SIGNIFICANT CHANGE UP (ref 0–6)
GLUCOSE BLDC GLUCOMTR-MCNC: 113 MG/DL — HIGH (ref 70–99)
GLUCOSE BLDC GLUCOMTR-MCNC: 137 MG/DL — HIGH (ref 70–99)
GLUCOSE SERPL-MCNC: 107 MG/DL — HIGH (ref 70–99)
HCT VFR BLD CALC: 38.6 % — LOW (ref 39–50)
HGB BLD-MCNC: 10.9 G/DL — LOW (ref 13–17)
HYPOCHROMIA BLD QL: SLIGHT — SIGNIFICANT CHANGE UP
LYMPHOCYTES # BLD AUTO: 1.19 K/UL — SIGNIFICANT CHANGE UP (ref 1–3.3)
LYMPHOCYTES # BLD AUTO: 8.9 % — LOW (ref 13–44)
MACROCYTES BLD QL: SLIGHT — SIGNIFICANT CHANGE UP
MANUAL SMEAR VERIFICATION: SIGNIFICANT CHANGE UP
MCHC RBC-ENTMCNC: 25.7 PG — LOW (ref 27–34)
MCHC RBC-ENTMCNC: 28.2 G/DL — LOW (ref 32–36)
MCV RBC AUTO: 91 FL — SIGNIFICANT CHANGE UP (ref 80–100)
MONOCYTES # BLD AUTO: 1.98 K/UL — HIGH (ref 0–0.9)
MONOCYTES NFR BLD AUTO: 14.9 % — HIGH (ref 2–14)
NEUTROPHILS # BLD AUTO: 10.15 K/UL — HIGH (ref 1.8–7.4)
NEUTROPHILS NFR BLD AUTO: 76.2 % — SIGNIFICANT CHANGE UP (ref 43–77)
OVALOCYTES BLD QL SMEAR: SLIGHT — SIGNIFICANT CHANGE UP
PLAT MORPH BLD: NORMAL — SIGNIFICANT CHANGE UP
PLATELET # BLD AUTO: 157 K/UL — SIGNIFICANT CHANGE UP (ref 150–400)
POIKILOCYTOSIS BLD QL AUTO: SIGNIFICANT CHANGE UP
POLYCHROMASIA BLD QL SMEAR: SLIGHT — SIGNIFICANT CHANGE UP
POTASSIUM SERPL-MCNC: 5 MMOL/L — SIGNIFICANT CHANGE UP (ref 3.5–5.3)
POTASSIUM SERPL-SCNC: 5 MMOL/L — SIGNIFICANT CHANGE UP (ref 3.5–5.3)
PROT SERPL-MCNC: 6.9 G/DL — SIGNIFICANT CHANGE UP (ref 6–8.3)
RBC # BLD: 4.24 M/UL — SIGNIFICANT CHANGE UP (ref 4.2–5.8)
RBC # FLD: 19.9 % — HIGH (ref 10.3–14.5)
RBC BLD AUTO: ABNORMAL
SCHISTOCYTES BLD QL AUTO: SLIGHT — SIGNIFICANT CHANGE UP
SODIUM SERPL-SCNC: 132 MMOL/L — LOW (ref 135–145)
WBC # BLD: 13.32 K/UL — HIGH (ref 3.8–10.5)
WBC # FLD AUTO: 13.32 K/UL — HIGH (ref 3.8–10.5)

## 2025-04-14 PROCEDURE — 84100 ASSAY OF PHOSPHORUS: CPT

## 2025-04-14 PROCEDURE — 82945 GLUCOSE OTHER FLUID: CPT

## 2025-04-14 PROCEDURE — 99239 HOSP IP/OBS DSCHRG MGMT >30: CPT

## 2025-04-14 PROCEDURE — 90935 HEMODIALYSIS ONE EVALUATION: CPT

## 2025-04-14 PROCEDURE — C1752: CPT

## 2025-04-14 PROCEDURE — 88305 TISSUE EXAM BY PATHOLOGIST: CPT

## 2025-04-14 PROCEDURE — 87637 SARSCOV2&INF A&B&RSV AMP PRB: CPT

## 2025-04-14 PROCEDURE — 93971 EXTREMITY STUDY: CPT

## 2025-04-14 PROCEDURE — 82330 ASSAY OF CALCIUM: CPT

## 2025-04-14 PROCEDURE — 74018 RADEX ABDOMEN 1 VIEW: CPT

## 2025-04-14 PROCEDURE — 85730 THROMBOPLASTIN TIME PARTIAL: CPT

## 2025-04-14 PROCEDURE — 94660 CPAP INITIATION&MGMT: CPT

## 2025-04-14 PROCEDURE — 49083 ABD PARACENTESIS W/IMAGING: CPT

## 2025-04-14 PROCEDURE — 83880 ASSAY OF NATRIURETIC PEPTIDE: CPT

## 2025-04-14 PROCEDURE — 87102 FUNGUS ISOLATION CULTURE: CPT

## 2025-04-14 PROCEDURE — C8929: CPT

## 2025-04-14 PROCEDURE — 82042 OTHER SOURCE ALBUMIN QUAN EA: CPT

## 2025-04-14 PROCEDURE — C1750: CPT

## 2025-04-14 PROCEDURE — C1894: CPT

## 2025-04-14 PROCEDURE — 85610 PROTHROMBIN TIME: CPT

## 2025-04-14 PROCEDURE — 80076 HEPATIC FUNCTION PANEL: CPT

## 2025-04-14 PROCEDURE — 80048 BASIC METABOLIC PNL TOTAL CA: CPT

## 2025-04-14 PROCEDURE — 97162 PT EVAL MOD COMPLEX 30 MIN: CPT

## 2025-04-14 PROCEDURE — 36556 INSERT NON-TUNNEL CV CATH: CPT

## 2025-04-14 PROCEDURE — 77001 FLUOROGUIDE FOR VEIN DEVICE: CPT

## 2025-04-14 PROCEDURE — 86480 TB TEST CELL IMMUN MEASURE: CPT

## 2025-04-14 PROCEDURE — C1769: CPT

## 2025-04-14 PROCEDURE — 89051 BODY FLUID CELL COUNT: CPT

## 2025-04-14 PROCEDURE — 84425 ASSAY OF VITAMIN B-1: CPT

## 2025-04-14 PROCEDURE — 82947 ASSAY GLUCOSE BLOOD QUANT: CPT

## 2025-04-14 PROCEDURE — 87186 SC STD MICRODIL/AGAR DIL: CPT

## 2025-04-14 PROCEDURE — P9047: CPT

## 2025-04-14 PROCEDURE — 84295 ASSAY OF SERUM SODIUM: CPT

## 2025-04-14 PROCEDURE — 76705 ECHO EXAM OF ABDOMEN: CPT

## 2025-04-14 PROCEDURE — 36569 INSJ PICC 5 YR+ W/O IMAGING: CPT

## 2025-04-14 PROCEDURE — 81003 URINALYSIS AUTO W/O SCOPE: CPT

## 2025-04-14 PROCEDURE — 87205 SMEAR GRAM STAIN: CPT

## 2025-04-14 PROCEDURE — 80074 ACUTE HEPATITIS PANEL: CPT

## 2025-04-14 PROCEDURE — 97110 THERAPEUTIC EXERCISES: CPT

## 2025-04-14 PROCEDURE — 83615 LACTATE (LD) (LDH) ENZYME: CPT

## 2025-04-14 PROCEDURE — 86850 RBC ANTIBODY SCREEN: CPT

## 2025-04-14 PROCEDURE — 87070 CULTURE OTHR SPECIMN AEROBIC: CPT

## 2025-04-14 PROCEDURE — 84157 ASSAY OF PROTEIN OTHER: CPT

## 2025-04-14 PROCEDURE — 87015 SPECIMEN INFECT AGNT CONCNTJ: CPT

## 2025-04-14 PROCEDURE — 87040 BLOOD CULTURE FOR BACTERIA: CPT

## 2025-04-14 PROCEDURE — 87340 HEPATITIS B SURFACE AG IA: CPT

## 2025-04-14 PROCEDURE — 85025 COMPLETE CBC W/AUTO DIFF WBC: CPT

## 2025-04-14 PROCEDURE — 81001 URINALYSIS AUTO W/SCOPE: CPT

## 2025-04-14 PROCEDURE — 97116 GAIT TRAINING THERAPY: CPT

## 2025-04-14 PROCEDURE — 82607 VITAMIN B-12: CPT

## 2025-04-14 PROCEDURE — 82435 ASSAY OF BLOOD CHLORIDE: CPT

## 2025-04-14 PROCEDURE — 87635 SARS-COV-2 COVID-19 AMP PRB: CPT

## 2025-04-14 PROCEDURE — 94010 BREATHING CAPACITY TEST: CPT

## 2025-04-14 PROCEDURE — 76937 US GUIDE VASCULAR ACCESS: CPT

## 2025-04-14 PROCEDURE — 84132 ASSAY OF SERUM POTASSIUM: CPT

## 2025-04-14 PROCEDURE — 74176 CT ABD & PELVIS W/O CONTRAST: CPT | Mod: MC

## 2025-04-14 PROCEDURE — 70450 CT HEAD/BRAIN W/O DYE: CPT | Mod: MC

## 2025-04-14 PROCEDURE — 88112 CYTOPATH CELL ENHANCE TECH: CPT

## 2025-04-14 PROCEDURE — 36600 WITHDRAWAL OF ARTERIAL BLOOD: CPT

## 2025-04-14 PROCEDURE — 36415 COLL VENOUS BLD VENIPUNCTURE: CPT

## 2025-04-14 PROCEDURE — 87641 MR-STAPH DNA AMP PROBE: CPT

## 2025-04-14 PROCEDURE — 83735 ASSAY OF MAGNESIUM: CPT

## 2025-04-14 PROCEDURE — 70496 CT ANGIOGRAPHY HEAD: CPT | Mod: MC

## 2025-04-14 PROCEDURE — 86900 BLOOD TYPING SEROLOGIC ABO: CPT

## 2025-04-14 PROCEDURE — 84550 ASSAY OF BLOOD/URIC ACID: CPT

## 2025-04-14 PROCEDURE — C1751: CPT

## 2025-04-14 PROCEDURE — 83540 ASSAY OF IRON: CPT

## 2025-04-14 PROCEDURE — 80053 COMPREHEN METABOLIC PANEL: CPT

## 2025-04-14 PROCEDURE — 99261: CPT

## 2025-04-14 PROCEDURE — 71045 X-RAY EXAM CHEST 1 VIEW: CPT

## 2025-04-14 PROCEDURE — 82803 BLOOD GASES ANY COMBINATION: CPT

## 2025-04-14 PROCEDURE — 97530 THERAPEUTIC ACTIVITIES: CPT

## 2025-04-14 PROCEDURE — 36430 TRANSFUSION BLD/BLD COMPNT: CPT

## 2025-04-14 PROCEDURE — P9016: CPT

## 2025-04-14 PROCEDURE — 85014 HEMATOCRIT: CPT

## 2025-04-14 PROCEDURE — 87077 CULTURE AEROBIC IDENTIFY: CPT

## 2025-04-14 PROCEDURE — 86923 COMPATIBILITY TEST ELECTRIC: CPT

## 2025-04-14 PROCEDURE — 82962 GLUCOSE BLOOD TEST: CPT

## 2025-04-14 PROCEDURE — 87640 STAPH A DNA AMP PROBE: CPT

## 2025-04-14 PROCEDURE — 84443 ASSAY THYROID STIM HORMONE: CPT

## 2025-04-14 PROCEDURE — 0042T: CPT | Mod: MC

## 2025-04-14 PROCEDURE — 83605 ASSAY OF LACTIC ACID: CPT

## 2025-04-14 PROCEDURE — 36558 INSERT TUNNELED CV CATH: CPT

## 2025-04-14 PROCEDURE — 87075 CULTR BACTERIA EXCEPT BLOOD: CPT

## 2025-04-14 PROCEDURE — 94640 AIRWAY INHALATION TREATMENT: CPT

## 2025-04-14 PROCEDURE — 85027 COMPLETE CBC AUTOMATED: CPT

## 2025-04-14 PROCEDURE — 82140 ASSAY OF AMMONIA: CPT

## 2025-04-14 PROCEDURE — 70498 CT ANGIOGRAPHY NECK: CPT | Mod: MC

## 2025-04-14 PROCEDURE — 84145 PROCALCITONIN (PCT): CPT

## 2025-04-14 PROCEDURE — 82746 ASSAY OF FOLIC ACID SERUM: CPT

## 2025-04-14 PROCEDURE — C1729: CPT

## 2025-04-14 PROCEDURE — 87086 URINE CULTURE/COLONY COUNT: CPT

## 2025-04-14 PROCEDURE — 86901 BLOOD TYPING SEROLOGIC RH(D): CPT

## 2025-04-14 PROCEDURE — 85018 HEMOGLOBIN: CPT

## 2025-04-14 PROCEDURE — 86780 TREPONEMA PALLIDUM: CPT

## 2025-04-14 PROCEDURE — 93005 ELECTROCARDIOGRAM TRACING: CPT

## 2025-04-14 PROCEDURE — 83550 IRON BINDING TEST: CPT

## 2025-04-14 PROCEDURE — 84484 ASSAY OF TROPONIN QUANT: CPT

## 2025-04-14 PROCEDURE — 84207 ASSAY OF VITAMIN B-6: CPT

## 2025-04-14 RX ORDER — INSULIN GLARGINE-YFGN 100 [IU]/ML
15 INJECTION, SOLUTION SUBCUTANEOUS
Refills: 0 | DISCHARGE

## 2025-04-14 RX ORDER — SODIUM CHLORIDE 0.65 %
1 AEROSOL, SPRAY (ML) NASAL
Qty: 0 | Refills: 0 | DISCHARGE
Start: 2025-04-14

## 2025-04-14 RX ORDER — FOLIC ACID 1 MG/1
1 TABLET ORAL
Qty: 0 | Refills: 0 | DISCHARGE
Start: 2025-04-14

## 2025-04-14 RX ORDER — BUMETANIDE 1 MG/1
1 TABLET ORAL
Qty: 0 | Refills: 0 | DISCHARGE

## 2025-04-14 RX ORDER — INSULIN LISPRO 100 U/ML
3 INJECTION, SOLUTION INTRAVENOUS; SUBCUTANEOUS
Qty: 0 | Refills: 0 | DISCHARGE
Start: 2025-04-14

## 2025-04-14 RX ORDER — POLYETHYLENE GLYCOL 3350 17 G/17G
17 POWDER, FOR SOLUTION ORAL
Qty: 0 | Refills: 0 | DISCHARGE
Start: 2025-04-14

## 2025-04-14 RX ORDER — DULAGLUTIDE 4.5 MG/.5ML
1.5 INJECTION, SOLUTION SUBCUTANEOUS
Refills: 0 | DISCHARGE

## 2025-04-14 RX ORDER — INSULIN GLARGINE-YFGN 100 [IU]/ML
18 INJECTION, SOLUTION SUBCUTANEOUS
Qty: 0 | Refills: 0 | DISCHARGE
Start: 2025-04-14

## 2025-04-14 RX ORDER — EPOETIN ALFA 10000 [IU]/ML
10000 SOLUTION INTRAVENOUS; SUBCUTANEOUS
Qty: 0 | Refills: 0 | DISCHARGE

## 2025-04-14 RX ORDER — METOPROLOL SUCCINATE 50 MG/1
1 TABLET, EXTENDED RELEASE ORAL
Refills: 0 | DISCHARGE

## 2025-04-14 RX ORDER — BISACODYL 5 MG
1 TABLET, DELAYED RELEASE (ENTERIC COATED) ORAL
Qty: 0 | Refills: 0 | DISCHARGE
Start: 2025-04-14

## 2025-04-14 RX ORDER — PSYLLIUM SEED (WITH DEXTROSE)
1 POWDER (GRAM) ORAL
Qty: 0 | Refills: 0 | DISCHARGE
Start: 2025-04-14

## 2025-04-14 RX ORDER — FUROSEMIDE 10 MG/ML
1 INJECTION INTRAMUSCULAR; INTRAVENOUS
Refills: 0 | DISCHARGE

## 2025-04-14 RX ORDER — MELATONIN 5 MG
1 TABLET ORAL
Qty: 0 | Refills: 0 | DISCHARGE
Start: 2025-04-14

## 2025-04-14 RX ORDER — SIMETHICONE 80 MG
1 TABLET,CHEWABLE ORAL
Qty: 0 | Refills: 0 | DISCHARGE
Start: 2025-04-14

## 2025-04-14 RX ORDER — APIXABAN 2.5 MG/1
1 TABLET, FILM COATED ORAL
Qty: 0 | Refills: 0 | DISCHARGE
Start: 2025-04-14

## 2025-04-14 RX ORDER — METOPROLOL SUCCINATE 50 MG/1
1 TABLET, EXTENDED RELEASE ORAL
Qty: 0 | Refills: 0 | DISCHARGE
Start: 2025-04-14

## 2025-04-14 RX ADMIN — Medication 40 MILLIGRAM(S): at 06:10

## 2025-04-14 RX ADMIN — Medication 100 MILLIGRAM(S): at 13:24

## 2025-04-14 RX ADMIN — Medication 1 DOSE(S): at 06:12

## 2025-04-14 RX ADMIN — METOPROLOL SUCCINATE 25 MILLIGRAM(S): 50 TABLET, EXTENDED RELEASE ORAL at 06:10

## 2025-04-14 RX ADMIN — Medication 1 SPRAY(S): at 06:12

## 2025-04-14 RX ADMIN — FOLIC ACID 1 MILLIGRAM(S): 1 TABLET ORAL at 13:25

## 2025-04-14 RX ADMIN — EPOETIN ALFA 10000 UNIT(S): 10000 SOLUTION INTRAVENOUS; SUBCUTANEOUS at 07:32

## 2025-04-14 RX ADMIN — Medication 1 APPLICATION(S): at 06:50

## 2025-04-14 RX ADMIN — IPRATROPIUM BROMIDE AND ALBUTEROL SULFATE 3 MILLILITER(S): .5; 2.5 SOLUTION RESPIRATORY (INHALATION) at 13:24

## 2025-04-14 RX ADMIN — APIXABAN 2.5 MILLIGRAM(S): 2.5 TABLET, FILM COATED ORAL at 06:10

## 2025-04-14 RX ADMIN — INSULIN LISPRO 3 UNIT(S): 100 INJECTION, SOLUTION INTRAVENOUS; SUBCUTANEOUS at 13:23

## 2025-04-14 RX ADMIN — IPRATROPIUM BROMIDE AND ALBUTEROL SULFATE 3 MILLILITER(S): .5; 2.5 SOLUTION RESPIRATORY (INHALATION) at 06:11

## 2025-04-14 RX ADMIN — Medication 667 MILLIGRAM(S): at 13:24

## 2025-04-14 RX ADMIN — Medication 4 MILLIGRAM(S): at 15:39

## 2025-04-14 RX ADMIN — BUMETANIDE 2 MILLIGRAM(S): 1 TABLET ORAL at 06:10

## 2025-04-14 RX ADMIN — Medication 81 MILLIGRAM(S): at 13:25

## 2025-04-14 RX ADMIN — IPRATROPIUM BROMIDE AND ALBUTEROL SULFATE 3 MILLILITER(S): .5; 2.5 SOLUTION RESPIRATORY (INHALATION) at 02:01

## 2025-04-14 NOTE — PROGRESS NOTE ADULT - SUBJECTIVE AND OBJECTIVE BOX
[facility] DIVISION OF KIDNEY DISEASE AND HYPERTENSION  [phone]    RENAL FOLLOW UP NOTE- NEPHROHOSPITALIST  --------------------------------------------------------------------------------  Patient seen and examined on HD at 9:51AM.  Tolerating treatment    PAST HISTORY  --------------------------------------------------------------------------------  No significant changes to PMH, PSH, FHx, SHx, unless otherwise noted    ALLERGIES & MEDICATIONS  --------------------------------------------------------------------------------  Allergies    No Known Allergies    Intolerances      Standing Inpatient Medications  albuterol/ipratropium for Nebulization 3 milliLiter(s) Nebulizer every 6 hours  allopurinol 100 milliGRAM(s) Oral daily  apixaban 2.5 milliGRAM(s) Oral two times a day  aspirin enteric coated 81 milliGRAM(s) Oral daily  atorvastatin 20 milliGRAM(s) Oral at bedtime  buMETAnide Injectable 2 milliGRAM(s) IV Push every 12 hours  calcium acetate 667 milliGRAM(s) Oral three times a day with meals  chlorhexidine 2% Cloths 1 Application(s) Topical daily  chlorhexidine 4% Liquid 1 Application(s) Topical <User Schedule>  dextrose 5%. 1000 milliLiter(s) IV Continuous <Continuous>  dextrose 5%. 1000 milliLiter(s) IV Continuous <Continuous>  dextrose 50% Injectable 25 Gram(s) IV Push once  dextrose 50% Injectable 12.5 Gram(s) IV Push once  dextrose 50% Injectable 25 Gram(s) IV Push once  epoetin lashawn (EPOGEN) Injectable 35278 Unit(s) IV Push <User Schedule>  fluticasone propionate/ salmeterol 250-50 MICROgram(s) Diskus 1 Dose(s) Inhalation two times a day  folic acid 1 milliGRAM(s) Oral daily  glucagon  Injectable 1 milliGRAM(s) IntraMuscular once  insulin glargine Injectable (LANTUS) 18 Unit(s) SubCutaneous at bedtime  insulin lispro (ADMELOG) corrective regimen sliding scale   SubCutaneous three times a day before meals  insulin lispro Injectable (ADMELOG) 3 Unit(s) SubCutaneous three times a day before meals  metoprolol succinate ER 25 milliGRAM(s) Oral daily  pantoprazole    Tablet 40 milliGRAM(s) Oral every 12 hours  polyethylene glycol 3350 17 Gram(s) Oral two times a day  psyllium Powder 1 Packet(s) Oral daily  sodium chloride 0.65% Nasal 1 Spray(s) Both Nostrils two times a day  tamsulosin 0.4 milliGRAM(s) Oral at bedtime  tiotropium 2.5 MICROgram(s) Inhaler 2 Puff(s) Inhalation daily    PRN Inpatient Medications  acetaminophen     Tablet .. 650 milliGRAM(s) Oral every 6 hours PRN  bisacodyl 5 milliGRAM(s) Oral every 12 hours PRN  dextrose Oral Gel 15 Gram(s) Oral once PRN  dextrose Oral Gel 15 Gram(s) Oral once PRN  melatonin 3 milliGRAM(s) Oral at bedtime PRN  ondansetron Injectable 4 milliGRAM(s) IV Push every 6 hours PRN  simethicone 80 milliGRAM(s) Chew every 8 hours PRN  sodium chloride 0.9% lock flush 10 milliLiter(s) IV Push every 1 hour PRN      FOCUSED REVIEW OF SYSTEMS  --------------------------------------------------------------------------------  decreased fatigue  denies CP/palpitations  denies SOB at rest  improving LE edema      VITALS/PHYSICAL EXAM  --------------------------------------------------------------------------------  T(C): 36.1 (04-14-25 @ 10:39), Max: 36.8 (04-14-25 @ 04:05)  HR: 70 (04-14-25 @ 10:39) (67 - 73)  BP: 121/74 (04-14-25 @ 10:39) (104/68 - 121/74)  RR: 18 (04-14-25 @ 10:39) (18 - 18)  SpO2: 100% (04-14-25 @ 10:39) (95% - 100%)  Wt(kg): --        04-14-25 @ 07:01  -  04-14-25 @ 12:04  --------------------------------------------------------  IN: 0 mL / OUT: 2500 mL / NET: -2500 mL      Physical Exam:  	Gen: NAD, lying in bed  	Pulm: CTA B/L ant fields, decreased breath sounds at axilla  	CV: RRR, S1S2  	Abd: +BS, soft, nontender/nondistended  	: No suprapubic tenderness.  no ro          Extremity: b/l LE Pitting edema at ankles, much improved.  b/l LE compression wraps  	Access: R chest tunneled catheter being utilized for HD      LABS/STUDIES  --------------------------------------------------------------------------------              10.9   13.32 >-----------<  157      [04-14-25 @ 06:40]              38.6     132  |  96  |  49  ----------------------------<  107      [04-14-25 @ 06:40]  5.0   |  21  |  5.11        Ca     8.5     [04-14-25 @ 06:40]      Mg     2.5     [04-13-25 @ 06:25]      Phos  4.4     [04-13-25 @ 06:25]    TPro  6.9  /  Alb  2.7  /  TBili  0.6  /  DBili  x   /  AST  32  /  ALT  7   /  AlkPhos  290  [04-14-25 @ 06:40]            Creatinine Trend:  SCr 5.11 [04-14 @ 06:40]  SCr 4.63 [04-13 @ 06:25]  SCr 4.02 [04-12 @ 07:22]  SCr 4.86 [04-11 @ 06:32]  SCr 3.96 [04-10 @ 06:38]              Urinalysis - [04-14-25 @ 06:40]      Color  / Appearance  / SG  / pH       Gluc 107 / Ketone   / Bili  / Urobili        Blood  / Protein  / Leuk Est  / Nitrite       RBC  / WBC  / Hyaline  / Gran  / Sq Epi  / Non Sq Epi  / Bacteria       Iron 16, TIBC 243, %sat 6      [03-15-25 @ 05:59]  Ferritin 178      [01-15-25 @ 20:10]  TSH 3.44      [03-30-25 @ 11:04]  Lipid: chol 123, TG 91, HDL 33, LDL --      [08-08-24 @ 06:50]    Syphilis Screen (Treponema Pallidum Ab) Negative      [03-30-25 @ 11:04]

## 2025-04-14 NOTE — DISCHARGE NOTE PROVIDER - HOSPITAL COURSE
An 79-year-old male with a history of CAD s/p CABG, heart failure, atrial fibrillation, COPD, CKD, HTN, HLD, T2DM, and BPH, transferred from [facility] for HF exacerbation. His hospitalization was complicated by: DENISHA requiring HD, anemia due to blood loss (likely GI related), and a possible stroke (resolved). He also had leukocytosis (treated with meropenem), s/p paracentesis on 3/14 4.6 liters was removed; acute on chronic respiratory failure requiring Bipap, and a resolved upper extremity DVT. His heart failure is being managed medically, and he declined RHC and CardioMEMS. He is DNR/DNI but wishes to keep his ICD active.  Palliative Care has been consulted multiple times regarding goals of care. Currently, he is tolerating HD, his volume status has improved, and his anemia is being managed with epogen. He requires BiPAP for respiratory support.     Medications:  Discharge on Bumex 2 mg PO daily.    Continue BiPAP qhs and prn.  Continue GDMT (Metoprolol, holding Entresto and Aldactone for DENISHA).  Continue Hydralazine/Nitrate.  Continue Eliquis and Aspirin  Continue Advair, Spiriva, and Duonebs.    Continue hemodialysis on scheduled days: M/W/F.      Pt is a DNR/DNI.      An 79-year-old male with a history of CAD s/p CABG, heart failure, atrial fibrillation, COPD, CKD, HTN, HLD, T2DM, and BPH, transferred from [facility] for HF exacerbation. His hospitalization was complicated by: DENISHA requiring HD, anemia due to blood loss (likely GI related), and a possible stroke (resolved). He also had leukocytosis (treated with meropenem), s/p paracentesis on 3/14 4.6 liters was removed; acute on chronic respiratory failure requiring Bipap, and a resolved upper extremity DVT. His heart failure is being managed medically, and he declined RHC and CardioMEMS. He is DNR/DNI but wishes to keep his ICD active.  Palliative Care has been consulted multiple times regarding goals of care. Currently, he is tolerating HD, his volume status has improved, and his anemia is being managed with epogen. He requires BiPAP for respiratory support.     Medications:  Discharge on Bumex 2 mg PO daily.    Continue BiPAP qhs and prn.  Continue GDMT (Metoprolol, holding Entresto and Aldactone for DENISHA).  Continue Hydralazine/Nitrate.  Continue Eliquis and Aspirin  Continue Advair, Spiriva, and Duonebs.    Continue hemodialysis on scheduled days: M/W/F.      Pt is a DNR/DNI.       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   Epistaxis  HTN  Ascites mixed with blood  UTI   An 79-year-old male with a history of CAD s/p CABG, heart failure, atrial fibrillation, COPD, CKD, HTN, HLD, T2DM, and BPH, transferred from [facility] for HF exacerbation. His hospitalization was complicated by: DENISHA requiring HD, anemia due to blood loss (likely GI related), and a possible stroke (resolved). He also had leukocytosis (treated with meropenem), s/p paracentesis on 3/14 4.6 liters was removed; acute on chronic respiratory failure requiring Bipap, and a resolved upper extremity DVT. His heart failure is being managed medically, and he declined RHC and CardioMEMS. He is DNR/DNI but wishes to keep his ICD active.  Palliative Care has been consulted multiple times regarding goals of care. Currently, he is tolerating HD, his volume status has improved, and his anemia is being managed with epogen. He requires BiPAP for respiratory support.     Medications:  Discharge on Bumex 2 mg PO daily.    Continue BiPAP qhs and prn.  Continue GDMT (Metoprolol, holding Entresto and Aldactone for DENISHA).  Continue Hydralazine/Nitrate.  Continue Eliquis and Aspirin  Continue Advair, Spiriva, and Duonebs.    Hold Xarelto  Continue hemodialysis on scheduled days: M/W/F.      Pt is a DNR/DNI.       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   Epistaxis  HTN  Ascites mixed with blood  UTI

## 2025-04-14 NOTE — PROGRESS NOTE ADULT - NSPROGADDITIONALINFOA_GEN_ALL_CORE
D/w ACP Meño
D/w wife 4/3  D/w Dr Say Rueda 4/3 at [place] for peer to peer, requesting updated PT notes   Plan is for [facility]
Please do not hesitate to TEAMS Dr. Shirley if further input needed during the day.  For questions Weekdays after 5PM and on weekends, please page the Renal Fellow on call.
above plans discussed with medicine ACP Laurita  overall guarded prognosis
The necessity of the time spent during the encounter on this date of service was due to:   - Ordering, reviewing, and interpreting labs, testing, and imaging.  - Independently obtaining a review of systems and performing a physical exam  - Reviewing prior hospitalization and where necessary, outpatient records.  - Counselling and educating patient and family regarding interpretation of aforementioned items and plan of care.    Time-based billing (NON-critical care). Total minutes spent: 40
The necessity of the time spent during the encounter on this date of service was due to:   - Ordering, reviewing, and interpreting labs, testing, and imaging.  - Independently obtaining a review of systems and performing a physical exam  - Reviewing prior hospitalization and where necessary, outpatient records.  - Counselling and educating patient and family regarding interpretation of aforementioned items and plan of care.    Time-based billing (NON-critical care). Total minutes spent: 54
above plans discussed with medicine ACP Laurita
Please do not hesitate to TEAMS Dr. Shirley if further input needed during the day.  For questions Weekdays after 5PM and on weekends, please page the Renal Fellow on call.
D/w JAYDEN Belle
The necessity of the time spent during the encounter on this date of service was due to:   - Ordering, reviewing, and interpreting labs, testing, and imaging.  - Independently obtaining a review of systems and performing a physical exam  - Reviewing prior hospitalization and where necessary, outpatient records.  - Counselling and educating patient and family regarding interpretation of aforementioned items and plan of care.    Time-based billing (NON-critical care). Total minutes spent: 40
The necessity of the time spent during the encounter on this date of service was due to:   - Ordering, reviewing, and interpreting labs, testing, and imaging.  - Independently obtaining a review of systems and performing a physical exam  - Reviewing prior hospitalization and where necessary, outpatient records.  - Counselling and educating patient and family regarding interpretation of aforementioned items and plan of care.    Time-based billing (NON-critical care). Total minutes spent: 54
The necessity of the time spent during the encounter on this date of service was due to:   - Ordering, reviewing, and interpreting labs, testing, and imaging.  - Independently obtaining a review of systems and performing a physical exam  - Reviewing prior hospitalization and where necessary, outpatient records.  - Counselling and educating patient and family regarding interpretation of aforementioned items and plan of care.    Time-based billing (NON-critical care). Total minutes spent: 54
Please do not hesitate to TEAMS Dr. Shirley if further input needed during the day.  For questions Weekdays after 5PM and on weekends, please page the Renal Fellow on call.
Please do not hesitate to TEAMS Dr. Shirley if further input needed during the day.  For questions Weekdays after 5PM and on weekends, please page the Renal Fellow on call.
The necessity of the time spent during the encounter on this date of service was due to:   - Ordering, reviewing, and interpreting labs, testing, and imaging.  - Independently obtaining a review of systems and performing a physical exam  - Reviewing prior hospitalization and where necessary, outpatient records.  - Counselling and educating patient and family regarding interpretation of aforementioned items and plan of care.    Time-based billing (NON-critical care). Total minutes spent: 54
D/w pt and wife 4/2  Plan for HD/[facility]  D/w JAYDEN Mckinney
Dispo: To continue bumex drip through the weekend with plan to discharge to [facility] early next week.    Discussed plan of care with patient and patient's wife at beside, ACP, and HF attending.
The necessity of the time spent during the encounter on this date of service was due to:   - Ordering, reviewing, and interpreting labs, testing, and imaging.  - Independently obtaining a review of systems and performing a physical exam  - Reviewing prior hospitalization and where necessary, outpatient records.  - Counselling and educating patient and family regarding interpretation of aforementioned items and plan of care.    Time-based billing (NON-critical care). Total minutes spent: 54
D/w family and pt 4/1- want to think about comfort care vs sven   Pending pallative eval
Dispo: Discharge to [facility] once volume optimized and cleared by HF.
The necessity of the time spent during the encounter on this date of service was due to:   - Ordering, reviewing, and interpreting labs, testing, and imaging.  - Independently obtaining a review of systems and performing a physical exam  - Reviewing prior hospitalization and where necessary, outpatient records.  - Counselling and educating patient and family regarding interpretation of aforementioned items and plan of care.    Time-based billing (NON-critical care). Total minutes spent: 54
Aggressive K repletion. Check BMP in the afternoon    55 minutes spent  Aliyah Correa MD  Division of Hospital Medicine  Available on Microsoft Teams
D/w ACP Meño
D/w wife 3/31 is considering comfort measures now  D/w pt, deferring to wife   Pending pallative eval   D/w ACP Laurita
The necessity of the time spent during the encounter on this date of service was due to:   - Ordering, reviewing, and interpreting labs, testing, and imaging.  - Independently obtaining a review of systems and performing a physical exam  - Reviewing prior hospitalization and where necessary, outpatient records.  - Counselling and educating patient and family regarding interpretation of aforementioned items and plan of care.    Time-based billing (NON-critical care). Total minutes spent: 54
The necessity of the time spent during the encounter on this date of service was due to:   - Ordering, reviewing, and interpreting labs, testing, and imaging.  - Independently obtaining a review of systems and performing a physical exam  - Reviewing prior hospitalization and where necessary, outpatient records.  - Counselling and educating patient and family regarding interpretation of aforementioned items and plan of care.    Time-based billing (NON-critical care). Total minutes spent: 54
Dispo: Discharge to [facility] once volume optimized and cleared by HF.
The necessity of the time spent during the encounter on this date of service was due to:   - Ordering, reviewing, and interpreting labs, testing, and imaging.  - Independently obtaining a review of systems and performing a physical exam  - Reviewing prior hospitalization and where necessary, outpatient records.  - Counselling and educating patient and family regarding interpretation of aforementioned items and plan of care.    Time-based billing (NON-critical care). Total minutes spent: 54
Pending PT re-eval   Plan is for [facility] with HD if pt can participate   Discussed GOC pt/family do not want hospice
Dispo: Discharge to [facility] once volume optimized.
The necessity of the time spent during the encounter on this date of service was due to:   - Ordering, reviewing, and interpreting labs, testing, and imaging.  - Independently obtaining a review of systems and performing a physical exam  - Reviewing prior hospitalization and where necessary, outpatient records.  - Counselling and educating patient and family regarding interpretation of aforementioned items and plan of care.    Time-based billing (NON-critical care). Total minutes spent: 54
D/w JAYDEN Shay
D/w wife 3/28  Eventual dispo is sven with [facility]  D/w JAYDEN Bermudez
HF and Nephrology consult. IV diuresis. Cpap. Updated wife    55 minutes spent  Aliyah Correa MD  Division of Hospital Medicine  Available on [employer] Teams
The necessity of the time spent during the encounter on this date of service was due to:   - Ordering, reviewing, and interpreting labs, testing, and imaging.  - Independently obtaining a review of systems and performing a physical exam  - Reviewing prior hospitalization and where necessary, outpatient records.  - Counselling and educating patient and family regarding interpretation of aforementioned items and plan of care.    Time-based billing (NON-critical care). Total minutes spent: 54
D/w wife 3/25  D/w JAYDEN Belle
The necessity of the time spent during the encounter on this date of service was due to:   - Ordering, reviewing, and interpreting labs, testing, and imaging.  - Independently obtaining a review of systems and performing a physical exam  - Reviewing prior hospitalization and where necessary, outpatient records.  - Counselling and educating patient and family regarding interpretation of aforementioned items and plan of care.    Time-based billing (NON-critical care). Total minutes spent: 54
D/w wife 3/24 updated on full plan of care   D/w ACP Nory
The necessity of the time spent during the encounter on this date of service was due to:   - Ordering, reviewing, and interpreting labs, testing, and imaging.  - Independently obtaining a review of systems and performing a physical exam  - Reviewing prior hospitalization and where necessary, outpatient records.  - Counselling and educating patient and family regarding interpretation of aforementioned items and plan of care.    Time-based billing (NON-critical care). Total minutes spent: 40
D/w JAYDEN Adamson
The necessity of the time spent during the encounter on this date of service was due to:   - Ordering, reviewing, and interpreting labs, testing, and imaging.  - Independently obtaining a review of systems and performing a physical exam  - Reviewing prior hospitalization and where necessary, outpatient records.  - Counselling and educating patient and family regarding interpretation of aforementioned items and plan of care.    Time-based billing (NON-critical care). Total minutes spent: 54
The necessity of the time spent during the encounter on this date of service was due to:   - Ordering, reviewing, and interpreting labs, testing, and imaging.  - Independently obtaining a review of systems and performing a physical exam  - Reviewing prior hospitalization and where necessary, outpatient records.  - Counselling and educating patient and family regarding interpretation of aforementioned items and plan of care.    Time-based billing (NON-critical care). Total minutes spent: 54
D/w ACP Meño
D/w JAYDEN Shay
D/w wife 3/27  PT- sven with HD
The necessity of the time spent during the encounter on this date of service was due to:   - Ordering, reviewing, and interpreting labs, testing, and imaging.  - Independently obtaining a review of systems and performing a physical exam  - Reviewing prior hospitalization and where necessary, outpatient records.  - Counselling and educating patient and family regarding interpretation of aforementioned items and plan of care.    Time-based billing (NON-critical care). Total minutes spent: 54
The necessity of the time spent during the encounter on this date of service was due to:   - Ordering, reviewing, and interpreting labs, testing, and imaging.  - Independently obtaining a review of systems and performing a physical exam  - Reviewing prior hospitalization and where necessary, outpatient records.  - Counselling and educating patient and family regarding interpretation of aforementioned items and plan of care.    Time-based billing (NON-critical care). Total minutes spent: 54
Dispo: To continue bumex drip through the weekend with plan to discharge to [facility] early next week.    Discussed plan of care with patient and patient's wife at beside, ACP, and HF attending.
D/w ACP Brittany
The necessity of the time spent during the encounter on this date of service was due to:   - Ordering, reviewing, and interpreting labs, testing, and imaging.  - Independently obtaining a review of systems and performing a physical exam  - Reviewing prior hospitalization and where necessary, outpatient records.  - Counselling and educating patient and family regarding interpretation of aforementioned items and plan of care.    Time-based billing (NON-critical care). Total minutes spent: 54
The necessity of the time spent during the encounter on this date of service was due to:   - Ordering, reviewing, and interpreting labs, testing, and imaging.  - Independently obtaining a review of systems and performing a physical exam  - Reviewing prior hospitalization and where necessary, outpatient records.  - Counselling and educating patient and family regarding interpretation of aforementioned items and plan of care.    Time-based billing (NON-critical care). Total minutes spent: 40
Dispo: Discharge to [facility] once volume optimized and cleared by HF.
Dispo: Discharge to [facility] once volume optimized.

## 2025-04-14 NOTE — DISCHARGE NOTE PROVIDER - CARE PROVIDER_API CALL
Blaise Shirley Chandler  Nephrology  [address] [place] [address]  Phone: [phone]  Fax: [phone]  Established Patient  Follow Up Time: 1 week    Genaro Kevin.  Pulmonary Disease  [address] [address]  Phone: [phone]  Fax: [phone]  Established Patient  Follow Up Time: 1 week

## 2025-04-14 NOTE — DISCHARGE NOTE NURSING/CASE MANAGEMENT/SOCIAL WORK - FINANCIAL ASSISTANCE
[facility] provides services at a reduced cost to those who are determined to be eligible through [facility]’s financial assistance program. Information regarding [facility]’s financial assistance program can be found by going to [email or web address] or by calling [phone].

## 2025-04-14 NOTE — PROGRESS NOTE ADULT - PROVIDER SPECIALTY LIST ADULT
Heart Failure
Hospitalist
Nephrology
Palliative Care
Pulmonology
Heart Failure
Hospitalist
Infectious Disease
Infectious Disease
Nephrology
Heart Failure
Heart Failure
Hospitalist
Infectious Disease
Internal Medicine
Nephrology
Podiatry
Heart Failure
Heart Failure
Internal Medicine
Nephrology
Neurology
Hospitalist
Nephrology
Heart Failure
Heart Failure
Hospitalist
Nephrology
Pulmonology
Heart Failure
Internal Medicine
Heart Failure
Hospitalist
Nephrology
Heart Failure
Hospitalist
Hospitalist
Palliative Care
Heart Failure
Heart Failure
Hospitalist
Internal Medicine
Internal Medicine
Pulmonology
Heart Failure
Heart Failure
Pulmonology
Hospitalist
Pulmonology
Internal Medicine
Pulmonology
Hospitalist
Pulmonology
Hospitalist
Internal Medicine
Hospitalist
Internal Medicine
Hospitalist

## 2025-04-14 NOTE — DISCHARGE NOTE PROVIDER - NSDCCPCAREPLAN_GEN_ALL_CORE_FT
PRINCIPAL DISCHARGE DIAGNOSIS  Diagnosis: Acute on chronic heart failure with reduced ejection fraction (HFrEF, <= 40%)  Assessment and Plan of Treatment:       SECONDARY DISCHARGE DIAGNOSES  Diagnosis: Acute kidney injury superimposed on CKD  Assessment and Plan of Treatment:     Diagnosis: CAD (coronary artery disease)  Assessment and Plan of Treatment:     Diagnosis: DVT of upper extremity (deep vein thrombosis)  Assessment and Plan of Treatment:     Diagnosis: Chronic atrial fibrillation  Assessment and Plan of Treatment:     Diagnosis: Chronic hypoxic respiratory failure  Assessment and Plan of Treatment:     Diagnosis: HTN (hypertension)  Assessment and Plan of Treatment:     Diagnosis: Anemia  Assessment and Plan of Treatment:     Diagnosis: Ascites  Assessment and Plan of Treatment:      PRINCIPAL DISCHARGE DIAGNOSIS  Diagnosis: Acute on chronic heart failure with reduced ejection fraction (HFrEF, <= 40%)  Assessment and Plan of Treatment: Continue present medication regimen.   follow up in the heart failure clinic in 1 week.      SECONDARY DISCHARGE DIAGNOSES  Diagnosis: Acute kidney injury superimposed on CKD  Assessment and Plan of Treatment: Now on hemodialysis.   Follow up Renal in 1 week.    Diagnosis: CAD (coronary artery disease)  Assessment and Plan of Treatment: Continue present medication regimen.   Follow up in Heart Failure Clinic in 1 week.    Diagnosis: DVT of upper extremity (deep vein thrombosis)  Assessment and Plan of Treatment: Resolved.    Diagnosis: Chronic atrial fibrillation  Assessment and Plan of Treatment: Continue Eliquis as prescribed.    Diagnosis: Chronic hypoxic respiratory failure  Assessment and Plan of Treatment: Continue present medication regimen.   Follow up outpt  with Pulmonary.   Follow up with Renal for HD to avoid fluid overload.    Diagnosis: HTN (hypertension)  Assessment and Plan of Treatment: Continue present medication regimen.   Follow up with PMD in week.    Diagnosis: Anemia  Assessment and Plan of Treatment: s/p 2 units of prbc.  H/H remains stable.    Diagnosis: Ascites  Assessment and Plan of Treatment: Mixed with blood in March.  No further episodes of ascities noted.

## 2025-04-14 NOTE — PROGRESS NOTE ADULT - PROBLEM SELECTOR PROBLEM 5
CAD (coronary artery disease)
CAD (coronary artery disease)
Acute kidney injury superimposed on CKD
Chronic atrial fibrillation
CAD (coronary artery disease)
Lethargy
Lethargy
CAD (coronary artery disease)
Acute on chronic respiratory failure with hypoxia and hypercapnia
Chronic atrial fibrillation
Acute kidney injury superimposed on CKD
Chronic atrial fibrillation
Acute kidney injury superimposed on CKD
Acute on chronic respiratory failure with hypoxia and hypercapnia
Chronic atrial fibrillation
Lethargy
DVT of upper extremity (deep vein thrombosis)
Acute kidney injury superimposed on CKD
Chronic atrial fibrillation
CAD (coronary artery disease)
Chronic atrial fibrillation
Acute kidney injury superimposed on CKD
Acute kidney injury superimposed on CKD
Lethargy
Acute kidney injury superimposed on CKD
Chronic atrial fibrillation
Lethargy
Chronic atrial fibrillation
Chronic atrial fibrillation
DVT of upper extremity (deep vein thrombosis)
Chronic atrial fibrillation
CAD (coronary artery disease)
Chronic atrial fibrillation
CAD (coronary artery disease)
Chronic atrial fibrillation
Chronic atrial fibrillation
CAD (coronary artery disease)
Chronic atrial fibrillation
DVT of upper extremity (deep vein thrombosis)
Chronic atrial fibrillation
Lethargy
Lethargy
Chronic atrial fibrillation
CAD (coronary artery disease)
Chronic atrial fibrillation
DVT of upper extremity (deep vein thrombosis)
Lethargy
Chronic atrial fibrillation
Chronic atrial fibrillation
Acute on chronic respiratory failure with hypoxia and hypercapnia
Chronic atrial fibrillation
Acute on chronic respiratory failure with hypoxia and hypercapnia
Acute on chronic respiratory failure with hypoxia and hypercapnia
Chronic atrial fibrillation
Acute on chronic respiratory failure with hypoxia and hypercapnia
Chronic atrial fibrillation
DVT of upper extremity (deep vein thrombosis)
Acute on chronic respiratory failure with hypoxia and hypercapnia
Chronic atrial fibrillation
Chronic atrial fibrillation
DVT of upper extremity (deep vein thrombosis)

## 2025-04-14 NOTE — PROGRESS NOTE ADULT - PROBLEM SELECTOR PLAN 1
Patient known to Dr. Acosta, who has transitioned care of this patient to [facility] while patient remains hospitalized  newly ESRD started on HD this admission  s/p permcath 03/28  Had 5 sessions of HD/UF last week  volume status much improved  seen on HD today, 04/14, tolerating treatment.  Plan for next treatment 04/16  d/w primary team-- would recall PT to assess patient's ability to participate with improved volume status  UF as tolerated    as per chart, declined RHC/cardiomems  phos at goal, no need for binders

## 2025-04-14 NOTE — PROGRESS NOTE ADULT - ASSESSMENT
78 y/o M with PMH of CAD s/p CABG, HF (combined HFrEF [EF 25%] and HFpEF [G3DD]) w/ ICD, AFib on Xarelto, severe AS s/p TAVR, COPD on 3-4L home O2, CKD3, HTN, HLD, T2DM, and BPH who presents as a transfer from [facility] for HF evaluation. He presented to [place] on 1/10/25 for worsening of his chronic SOB for 2 weeks, with associated increased LE edema and abdominal distention - admitted for acute on chronic hypoxic respiratory failure, treated with IV diuresis & Bipap. Tx to [facility] for further evaluation by HF team. Prolonged hospital course for CHF exacerbation, DENISHA on CKD now requiring HD, RRT/Code stroke 3/30 for lethargy and facial droop, CT head negative. Pulmonary called to consult for acute on chronic hypoxic/hypercapnic respiratory failure requiring Bipap.

## 2025-04-14 NOTE — DISCHARGE NOTE NURSING/CASE MANAGEMENT/SOCIAL WORK - NSDCVIVACCINE_GEN_ALL_CORE_FT
influenza, injectable, quadrivalent, preservative free; 10-Sep-2021 16:41; Gwen Terrell (RN); [employer]; [id number] (Exp. Date: 30-Jun-2022); IntraMuscular; Deltoid Right.; 0.5 milliLiter(s); VIS (VIS Published: 15-Aug-2019, VIS Presented: 10-Sep-2021);

## 2025-04-14 NOTE — DISCHARGE NOTE NURSING/CASE MANAGEMENT/SOCIAL WORK - NSDCPEFALRISK_GEN_ALL_CORE
For information on Fall & Injury Prevention, visit: [email or web address] OR  [email or web address] OR  https://www.cdc.gov/steadi/patient.html

## 2025-04-14 NOTE — DISCHARGE NOTE NURSING/CASE MANAGEMENT/SOCIAL WORK - NSDCFUADDAPPT_GEN_ALL_CORE_FT
APPTS ARE READY TO BE MADE: [X] YES    Best Family or Patient Contact (if needed):    Additional Information about above appointments (if needed):    1: Need an appt with heart failure 1 week.    2:   3:     Other comments or requests:

## 2025-04-14 NOTE — DISCHARGE NOTE PROVIDER - ATTENDING DISCHARGE PHYSICAL EXAMINATION:
GENERAL: NAD, well-developed, obese   HEAD:  Atraumatic, Normocephalic  EYES:  conjunctiva and sclera clear  CHEST/LUNG: +crackles bases   HEART: Regular rate and rhythm; S1S2  ABDOMEN: Soft, Nontender, Nondistended; Bowel sounds present  EXTREMITIES:  anasarca LUE swelling >RUE  PSYCH: AAOx3

## 2025-04-14 NOTE — PROGRESS NOTE ADULT - PROBLEM/PLAN-1
DISPLAY PLAN FREE TEXT

## 2025-04-14 NOTE — PROGRESS NOTE ADULT - PROBLEM SELECTOR PLAN 2
-s/p CABG follows with [place] team  - On statin and BB.
-s/p CABG follows with [place] team  - On statin and BB.
-s/p CABG follows with [place] team  - On statin and BB.  -okay to hold ASA for epistaxis, would actually hold off indefinitely
- Baseline Cr in August 1.5-1.81, now 2.65 and rising after starting entresto- likely medication prerenal DENISHA with likely component of volume depletion given ongoing aggressive diuresis. 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 Currently improving  - Likely DENISHA in setting of cardiorenal syndrome with contraction alkalosis as well, component of ATN  - Continue monitoring Cr and urine output. HF and renal following, f/u recs  - diuresis as above
- hgb stable   - CTAP with Moderate volume ascites. Layering hyperdensity within the ascites in the right paracolic gutter, suggestive of hemoperitoneum. Now resolved  - s/p 2 units of pRBC on 3/14, 1 unit prbc 3/16, 3/17  - resume xarelto/aspirin after permacath
-By hx  -On home O2 baseline 3LNC (sometimes 4LNC with exertion).   -Continue Advair, Spiriva  -Duoneb q6h  -Keep sats >90% with O2, currently at baseline O2 requirements  -Bipap qHS as above.
-s/p CABG follows with [place] team  - On statin and BB.
EF 30% with severe tricuspid regurg and PASP 55mmHG on 2/7/24. Dry wt is 243 lbs.   - cont bumex gtt- now off per renal recs  - HF signed off  - GDMT:  Metoprolol succinate 50mg day. Holding entresto and aldactone for DENISHA. Not on SGLT2.  - Hydralazine/Nitrate: hydral 50mg TID. Isordil 20mg TID   - Cardiology following, patient declined RHC and cardiomems  - per GOC with wife; pt dnr/dni but they wish to keep ICD active  - strict i/o- ro placed  - aggressively replete K for hypokalemia
- Likely DENISHA in setting of cardiorenal syndrome  - Continue monitoring Cr and urine output. Nephrology following, appreciate recs.
- hgb stable   - CTAP with Moderate volume ascites. Layering hyperdensity within the ascites in the right paracolic gutter, suggestive of hemoperitoneum. Now resolved  - s/p 2 units of pRBC on 3/14, 1 unit prbc 3/16, 3/17  - resume xarelto/aspirin after [place]
- hgb stable   - CTAP with Moderate volume ascites. Layering hyperdensity within the ascites in the right paracolic gutter, suggestive of hemoperitoneum. Now resolved  - s/p 2 units of pRBC on 3/14, 1 unit prbc 3/16, 3/17  - resumed AC with eliquis /aspirin after permacath placed
-s/p CABG follows with [place] team  - On statin and BB.
- Baseline Cr in August 1.5-1.81  - Cr in [place] this admission ranged from 1.93 -  3.27  - Likely DENISHA in setting of cardiorenal syndrome with contraction alkalosis as well  - Continue monitoring Cr and urine output- improving, at baseline scr now   - nephro recs
- hgb stable   - CTAP with Moderate volume ascites. Layering hyperdensity within the ascites in the right paracolic gutter, suggestive of hemoperitoneum. Now resolved  - s/p 2 units of pRBC on 3/14, 1 unit prbc 3/16, 3/17  - resume xarelto/aspirin after permacath
- hgb stable   - CTAP with Moderate volume ascites. Layering hyperdensity within the ascites in the right paracolic gutter, suggestive of hemoperitoneum. Now resolved  - s/p 2 units of pRBC on 3/14, 1 unit prbc 3/16, 3/17  - resumed AC with eliquis /aspirin after permacath placed
-s/p CABG follows with [place] team  - On statin and BB.  -okay to hold ASA for epistaxis, would actually hold off indefinitely
EF 30% with severe tricuspid regurg and PASP 55mmHG on 2/7/24. Dry wt is 243 lbs.   - cont bumex gtt- now on 4mg/hr  - diuril and HTS per HF recs  - GDMT:  Metoprolol succinate 50mg day. Holding entresto and aldactone for DENISHA. Not on SGLT2.  - Hydralazine/Nitrate: hydral 50mg TID. Isordil 20mg TID   - Cardiology following, patient declined RHC and cardiomems  - per GOC with wife; pt dnr/dni but they wish to keep ICD active  - strict i/o- ro placed  - aaggressively repleat K for hypokalemia
s/p CABG follows with [place] team  - On statin and BB.
-By hx  -On home O2 baseline 3LNC (sometimes 4LNC with exertion).   -Continue Advair, Spiriva  -Duoneb q6h  -Keep sats >90% with O2, currently at baseline O2 requirements  -Bipap qHS as above.
-s/p CABG follows with [place] team  - On statin and BB.
Hb 10.1 as of yesterday, goal 10-11  continue epogen with HD
Hb 10.4 today, goal 10-11  continue epogen with HD
Hb 10.9 today, goal 10-11  continue epogen with HD
- Baseline Cr in August 1.5-1.81  - Cr in [place] this admission ranged from 1.93 -  3.27, most recently 2.52   - Likely DENISHA in setting of cardiorenal syndrome with contraction alkalosis as well  - Continue monitoring Cr and urine output  - nephro recs
-s/p CABG follows with [place] team  - On statin and BB.
- Baseline Cr in August 1.5-1.81  - Cr in [place] this admission ranged from 1.93 -  3.27, most recently 2.52   - Likely DENISHA in setting of cardiorenal syndrome with contraction alkalosis as well  - Continue monitoring Cr and urine output- improving  - nephro recs
-By hx  -On home O2 baseline 3LNC (sometimes 4LNC with exertion).   -Continue Advair, Spriva  -Duoneb q6h  -Keep sats >90% with O2, currently at baseline O2 requirements  -Bipap qHS as above.
-By hx  -On home O2 baseline 3LNC (sometimes 4LNC with exertion).   -Continue Advair, Spriva  -Suggest change Duoneb to q6h standing  -Keep sats >90% with O2, currently at baseline O2 requirements.
-s/p CABG follows with [place] team  - On statin and BB.
-s/p CABG follows with [place] team  - On statin and BB.  -okay to hold ASA for epistaxis
- Baseline Cr in August 1.5-1.81  - Cr in [place] this admission ranged from 1.93 -  3.27  - Likely DENISHA in setting of cardiorenal syndrome with contraction alkalosis as well  - Continue monitoring Cr and urine output- improving, at baseline scr now   - nephro recs
- hgb stable   - CTAP with Moderate volume ascites. Layering hyperdensity within the ascites in the right paracolic gutter, suggestive of hemoperitoneum. Now resolved  - s/p 2 units of pRBC on 3/14, 1 unit prbc 3/16, 3/17  - resumed AC with eliquis /aspirin after permacath placed
-s/p CABG follows with [place] team  - On statin and BB.
-s/p CABG follows with [place] team  - On statin and BB.  -okay to hold ASA for epistaxis, would actually hold off indefinitely
-By hx  -On home O2 baseline 3LNC (sometimes 4LNC with exertion).   -Continue Advair, Spiriva  -Duoneb q6h  -Keep sats >90% with O2, currently at baseline O2 requirements  -Bipap qHS as above.
-s/p CABG follows with [place] team  - On statin and BB.  -okay to hold ASA for epistaxis, would actually hold off indefinitely
Hb 11 today, goal 10-11  continue epogen with HD, will hold if Hb continues to rise
-s/p CABG follows with [place] team  - On statin and BB.
-s/p CABG follows with [place] team  - On statin and BB.
- hgb stable   - CTAP with Moderate volume ascites. Layering hyperdensity within the ascites in the right paracolic gutter, suggestive of hemoperitoneum. Now resolved  - s/p 2 units of pRBC on 3/14, 1 unit prbc 3/16, 3/17  - resumed AC with eliquis /aspirin after permacath placed
-s/p CABG follows with [place] team  - On statin and BB.
-s/p CABG follows with [place] team  - On statin and BB.
- Likely DENISHA in setting of cardiorenal syndrome, improving  - Continue monitoring Cr and urine output. HF and renal following, f/u recs  - diuresis as above
- Likely DENISHA in setting of cardiorenal syndrome  - Continue monitoring Cr and urine output. Nephrology following, appreciate recs.
-s/p CABG follows with [place] team  - On statin and BB.  -okay to hold ASA for epistaxis, would actually hold off indefinitely
-s/p CABG follows with [place] team  - On statin and BB.  -okay to hold ASA for epistaxis, would actually hold off indefinitely
nephrology recommendations appreciated  pt declined HD
-By hx  -On home O2 baseline 3LNC (sometimes 4LNC with exertion).   -Continue Advair, Spiriva  -Duoneb q6h  -Keep sats >90% with O2, currently at baseline O2 requirements  -Bipap qHS as above.
- Likely DENISHA in setting of cardiorenal syndrome  - Cr stable 3/7  - Continue monitoring Cr and urine output. Nephrology following, appreciate recs.
- hgb stable   - CTAP with Moderate volume ascites. Layering hyperdensity within the ascites in the right paracolic gutter, suggestive of hemoperitoneum. Now resolved  - s/p 2 units of pRBC on 3/14, 1 unit prbc 3/16, 3/17  - resumed AC with eliquis /aspirin after permacath placed
- hgb stable   - CTAP with Moderate volume ascites. Layering hyperdensity within the ascites in the right paracolic gutter, suggestive of hemoperitoneum. Now resolved  - s/p 2 units of pRBC on 3/14, 1 unit prbc 3/16, 3/17  - resumed AC with eliquis /aspirin after permacath placed
- Baseline Cr in August 1.5-1.81  - Cr in [place] this admission ranged from 1.93 -  3.27  - Likely DENISHA in setting of cardiorenal syndrome with contraction alkalosis as well  - Continue monitoring Cr and urine output- improving, at baseline scr now   - nephro recs
- Baseline Cr in August 1.5-1.81, now 2.65 and rising after starting entresto- likely medication prerenal DENISHA with likely component of volume depletion given ongoing aggressive diuresis. 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 ongoing diuresis pending HF recs  - Likely DENISHA in setting of cardiorenal syndrome with contraction alkalosis as well  - Continue monitoring Cr and urine output- improving, at baseline scr now   - nephro recs
- EF 30% with severe tricuspid regurg and PASP 55mmHG on 2/7/24. Dry wt is 243 lbs.   - trial bumex 4mg iv TID again  - GDMT:  Metoprolol succinate 50mg day. Holding entresto and aldactone for DENISHA. Not on SGLT2.  - Hydralazine/Nitrate: hydral 50mg TID. Isordil 20mg TID   - Cardiology following, patient declined RHC and cardiomems  - per GOC with wife; pt dnr/dni but they wish to keep ICD active  - strict i/o- ro placed  - HF re-eval
- Likely DENISHA in setting of cardiorenal syndrome  - Up trending CR 2/2 diuresis vs GI losses.   - Continue monitoring Cr and urine output. Nephrology following, appreciate recs.
-s/p CABG follows with [place] team  - On statin and BB.  -okay to hold ASA for epistaxis, would actually hold off indefinitely
nephrology recommendations appreciated  pt declined HD
- Likely DENISHA in setting of cardiorenal syndrome  - Continue monitoring Cr and urine output. Nephrology following, appreciate recs.    #Leukocytosis- rising white count ove rthe past couple days; also with abd distension and pain. Ddx includes severe constipation given suboptimal BM vs. less likely SBP from cardiac ascites vs. possible UTI  - UA positive, started renally dosed meropenem, f/u ucx  - pt with large bm x 2, cont aggressive bowel regimen  - f/u dx/tx para given large ascites on imaging  - cont trending cbc
- hgb stable   - CTAP with Moderate volume ascites. Layering hyperdensity within the ascites in the right paracolic gutter, suggestive of hemoperitoneum. Now resolved  - s/p 2 units of pRBC on 3/14, 1 unit prbc 3/16, 3/17  - resumed AC with eliquis /aspirin after permacath placed
-s/p CABG follows with [place] team  - On statin and BB.  -okay to hold ASA for epistaxis, would actually hold off indefinitely
- Likely DENISHA in setting of cardiorenal syndrome  - Continue monitoring Cr and urine output. Nephrology following, appreciate recs.    #Leukocytosis- rising white count ove rthe past couple days; also with abd distension and pain. Ddx includes severe constipation given suboptimal BM vs. less likely SBP from cardiac ascites vs. possible UTI  - UA positive, started renally dosed meropenem, f/u ucx  - pt with large bm x 2, cont aggressive bowel regimen  - f/u dx/tx para given large ascites on imaging  - cont trending cbc
- Baseline Cr in August 1.5-1.81  - Cr in [place] this admission ranged from 1.93 -  3.27  - Likely DENISHA in setting of cardiorenal syndrome with contraction alkalosis as well  - Continue monitoring Cr and urine output- improving  - nephro recs
- Baseline Cr in August 1.5-1.81, now 2.65 and rising after starting entresto- likely medication prerenal DENISHA with likely component of volume depletion given ongoing aggressive diuresis. 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 HF and renal following, f/u recs  - diuresis as above
- Likely DENISHA in setting of cardiorenal syndrome  - Up trending CR 2/2 diuresis vs GI losses.   - Continue monitoring Cr and urine output. Nephrology following, appreciate recs.
EF 30% with severe tricuspid regurg and PASP 55mmHG on 2/7/24. Dry wt is 243 lbs.   - cont bumex gtt- now on 4mg/hr  - diuril and HTS per HF recs  - GDMT:  Metoprolol succinate 50mg day. Holding entresto and aldactone for DENISHA. Not on SGLT2.  - Hydralazine/Nitrate: hydral 50mg TID. Isordil 20mg TID   - Cardiology following, patient declined RHC and cardiomems  - per GOC with wife; pt dnr/dni but they wish to keep ICD active  - strict i/o- ro placed  - aggressively replete K for hypokalemia
- Likely DENISHA in setting of cardiorenal syndrome  - Continue monitoring Cr and urine output. Nephrology following, appreciate recs.
- hgb stable   - CTAP with Moderate volume ascites. Layering hyperdensity within the ascites in the right paracolic gutter, suggestive of hemoperitoneum. Now resolved  - s/p 2 units of pRBC on 3/14, 1 unit prbc 3/16, 3/17  - resumed AC with eliquis /aspirin after permacath placed
- hgb stable   - CTAP with Moderate volume ascites. Layering hyperdensity within the ascites in the right paracolic gutter, suggestive of hemoperitoneum. Now resolved  - s/p 2 units of pRBC on 3/14, 1 unit prbc 3/16, 3/17  - resumed AC with eliquis /aspirin after permacath placed
- Baseline Cr in August 1.5-1.81  - Cr in [place] this admission ranged from 1.93 -  3.27  - Likely DENISHA in setting of cardiorenal syndrome with contraction alkalosis as well  - Continue monitoring Cr and urine output- improving  - nephro recs
- Baseline Cr in August 1.5-1.81  - Cr in [place] this admission ranged from 1.93 -  3.27, most recently 2.52   - Likely DENISHA in setting of cardiorenal syndrome with contraction alkalosis as well  - Continue monitoring Cr and urine output  - nephro recs
- EF 30% with severe tricuspid regurg and PASP 55mmHG on 2/7/24. Dry wt is 243 lbs.   - trial bumex 4mg iv TID again  - GDMT:  Metoprolol succinate 50mg day. Holding entresto and aldactone for DENISHA. Not on SGLT2.  - Hydralazine/Nitrate: hydral 50mg TID. Isordil 20mg TID   - Cardiology following, patient declined RHC and cardiomems  - per GOC with wife; pt dnr/dni but they wish to keep ICD active  - strict i/o- ro placed  - HF re-eval
- Likely DENISHA in setting of cardiorenal syndrome  - Continue monitoring Cr and urine output. Nephrology following, appreciate recs.
EF 30% with severe tricuspid regurg and PASP 55mmHG on 2/7/24. Dry wt is 243 lbs.   - trial bumex 4mg iv TID again  - HF signed off  - GDMT:  Metoprolol succinate 50mg day. Holding entresto and aldactone for DENISHA. Not on SGLT2.  - Hydralazine/Nitrate: hydral 50mg TID. Isordil 20mg TID   - Cardiology following, patient declined RHC and cardiomems  - per GOC with wife; pt dnr/dni but they wish to keep ICD active  - strict i/o- ro placed
EF 30% with severe tricuspid regurg and PASP 55mmHG on 2/7/24. Dry wt is 243 lbs.   - trial bumex 4mg iv x 1 today  - HF signed off  - GDMT:  Metoprolol succinate 50mg day. Holding entresto and aldactone for DENISHA. Not on SGLT2.  - Hydralazine/Nitrate: hydral 50mg TID. Isordil 20mg TID   - Cardiology following, patient declined RHC and cardiomems  - per GOC with wife; pt dnr/dni but they wish to keep ICD active  - strict i/o- ro placed  - aggressively replete K for hypokalemia
- hgb stable   - CTAP with Moderate volume ascites. Layering hyperdensity within the ascites in the right paracolic gutter, suggestive of hemoperitoneum. Now resolved  - s/p 2 units of pRBC on 3/14, 1 unit prbc 3/16, 3/17  - resumed AC with eliquis /aspirin after permacath placed
- Baseline Cr in August 1.5-1.81  - Cr in [place] this admission ranged from 1.93 -  3.27  - Likely DENISHA in setting of cardiorenal syndrome with contraction alkalosis as well  - Continue monitoring Cr and urine output- improving  - nephro recs
- Baseline Cr in August 1.5-1.81  - Cr in [place] this admission ranged from 1.93 -  3.27, most recently 2.52   - Likely DENISHA in setting of cardiorenal syndrome with contraction alkalosis as well  - Continue monitoring Cr and urine output- improving  - nephro recs
- Baseline Cr in August 1.5-1.81  - Cr in [place] this admission ranged from 1.93 -  3.27, most recently 2.52   - Likely DENISHA in setting of cardiorenal syndrome with contraction alkalosis as well  - Continue monitoring Cr and urine output- improving  - nephro recs
- hgb stable   - CTAP with Moderate volume ascites. Layering hyperdensity within the ascites in the right paracolic gutter, suggestive of hemoperitoneum. Now resolved  - s/p 2 units of pRBC on 3/14, 1 unit prbc 3/16, 3/17  - resume xarelto/aspirin after permacath
- hgb stable   - CTAP with Moderate volume ascites. Layering hyperdensity within the ascites in the right paracolic gutter, suggestive of hemoperitoneum. Now resolved  - s/p 2 units of pRBC on 3/14, 1 unit prbc 3/16, 3/17  - resumed xarelto/aspirin after permacath
EF 30% with severe tricuspid regurg and PASP 55mmHG on 2/7/24. Dry wt is 243 lbs.   - trial bumex 4mg iv x 1 today  - HF signed off  - GDMT:  Metoprolol succinate 50mg day. Holding entresto and aldactone for DENISHA. Not on SGLT2.  - Hydralazine/Nitrate: hydral 50mg TID. Isordil 20mg TID   - Cardiology following, patient declined RHC and cardiomems  - per GOC with wife; pt dnr/dni but they wish to keep ICD active  - strict i/o- ro placed  - aggressively replete K for hypokalemia
- Baseline Cr in August 1.5-1.81, now 2.65 and rising after starting entresto- likely medication prerenal DENISHA with likely component of volume depletion given ongoing aggressive diuresis. 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 ongoing diuresis pending HF recs  - Likely DENISHA in setting of cardiorenal syndrome with contraction alkalosis as well  - Continue monitoring Cr and urine output- improving, at baseline scr now   - nephro recs
- hgb stable   - CTAP with Moderate volume ascites. Layering hyperdensity within the ascites in the right paracolic gutter, suggestive of hemoperitoneum. Now resolved  - s/p 2 units of pRBC on 3/14, 1 unit prbc 3/16, 3/17  - resume xarelto/aspirin after permacath
- hgb stable   - CTAP with Moderate volume ascites. Layering hyperdensity within the ascites in the right paracolic gutter, suggestive of hemoperitoneum. Now resolved  - s/p 2 units of pRBC on 3/14, 1 unit prbc 3/16, 3/17  - resumed AC with eliquis /aspirin after permacath placed
- Baseline Cr in August 1.5-1.81, now 2.65 and rising after starting entresto- likely medication prerenal DENISHA with likely component of volume depletion given ongoing aggressive diuresis. Must also r/o recurrent overload given pt has been bumex IV dependent most of his stay. Currently improved  - Likely DENISHA in setting of cardiorenal syndrome  - Continue monitoring Cr and urine output. HF and renal following, f/u recs  - diuresis as above
- Baseline Cr in August 1.5-1.81, now 2.65 and rising after starting entresto- likely medication prerenal DENISHA with likely component of volume depletion given ongoing aggressive diuresis. 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 ongoing diuresis pending HF recs  - Likely DENISHA in setting of cardiorenal syndrome with contraction alkalosis as well  - Continue monitoring Cr and urine output- improving, at baseline scr now   - nephro recs
- Likely DENISHA in setting of cardiorenal syndrome  - Continue monitoring Cr and urine output. Nephrology following, appreciate recs.    #Leukocytosis- rising white count ove rthe past couple days; also with abd distension and pain. Ddx includes severe constipation given suboptimal BM vs. less likely SBP from cardiac ascites vs. possible UTI  - draw UA/UCX  - RUQ with mild/mod ascites in all four quadrants; consider dx tap  - would empirically start renally dosed meropenem given DENISHA  - aggressive bowel regimen and suppository/enema vs. disimpaction if needed
- hgb stable   - CTAP with Moderate volume ascites. Layering hyperdensity within the ascites in the right paracolic gutter, suggestive of hemoperitoneum. Now resolved  - s/p 2 units of pRBC on 3/14, 1 unit prbc 3/16, 3/17  - resumed AC with eliquis /aspirin after permacath placed
- Baseline Cr in August 1.5-1.81  - Cr in [place] this admission ranged from 1.93 -  3.27  - Likely DENISHA in setting of cardiorenal syndrome with contraction alkalosis as well  - Continue monitoring Cr and urine output- improving  - nephro recs
- Baseline Cr in August 1.5-1.81  - Cr in [place] this admission ranged from 1.93 -  3.27, most recently 2.52   - Likely DENISHA in setting of cardiorenal syndrome with contraction alkalosis as well  - Continue monitoring Cr and urine output  - Nephrology consult
- hgb stable   - CTAP with Moderate volume ascites. Layering hyperdensity within the ascites in the right paracolic gutter, suggestive of hemoperitoneum. Now resolved  - s/p 2 units of pRBC on 3/14, 1 unit prbc 3/16, 3/17  - resumed AC with eliquis /aspirin after permacath placed
- Baseline Cr in August 1.5-1.81  - Cr in [place] this admission ranged from 1.93 -  3.27  - Likely DENISHA in setting of cardiorenal syndrome with contraction alkalosis as well  - Continue monitoring Cr and urine output- improving  - nephro recs
- Baseline Cr in August 1.5-1.81, now 2.65 after resuming entresto  - Cr in [place] this admission ranged from 1.93 -  3.27  - Likely DENISHA in setting of cardiorenal syndrome with contraction alkalosis as well  - Continue monitoring Cr and urine output- improving, at baseline scr now   - nephro recs
EF 30% with severe tricuspid regurg and PASP 55mmHG on 2/7/24. Dry wt is 243 lbs.   - cont bumex gtt- now off per renal recs  - HF signed off  - GDMT:  Metoprolol succinate 50mg day. Holding entresto and aldactone for DENISHA. Not on SGLT2.  - Hydralazine/Nitrate: hydral 50mg TID. Isordil 20mg TID   - Cardiology following, patient declined RHC and cardiomems  - per GOC with wife; pt dnr/dni but they wish to keep ICD active  - strict i/o- ro placed  - aggressively replete K for hypokalemia
- Baseline Cr in August 1.5-1.81, now 2.65 and rising after starting entresto- likely medication prerenal DENISHA with likely component of volume depletion given ongoing aggressive diuresis. 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 Currently improving  - Likely DENISHA in setting of cardiorenal syndrome  - Continue monitoring Cr and urine output. HF and renal following, f/u recs  - diuresis as above
- Likely DENISHA in setting of cardiorenal syndrome  - Continue monitoring Cr and urine output. Nephrology following, appreciate recs.
- Likely DENISHA in setting of cardiorenal syndrome, improving  - Continue monitoring Cr and urine output. HF and renal following, f/u recs  - diuresis as above
- Likely DENISHA in setting of cardiorenal syndrome  - Up trending CR 2/2 diuresis vs GI losses.   - Continue monitoring Cr and urine output. Nephrology following, appreciate recs.

## 2025-04-14 NOTE — PROGRESS NOTE ADULT - PROBLEM SELECTOR PROBLEM 1
Acute on chronic heart failure with reduced ejection fraction (HFrEF, <= 40%)
Acute on chronic heart failure with reduced ejection fraction (HFrEF, <= 40%)
ESRD on hemodialysis
Acute kidney injury superimposed on CKD
Acute on chronic heart failure with reduced ejection fraction (HFrEF, <= 40%)
Acute on chronic respiratory failure with hypoxia and hypercapnia
Acute on chronic heart failure with reduced ejection fraction (HFrEF, <= 40%)
Acute kidney injury superimposed on CKD
Acute on chronic heart failure with reduced ejection fraction (HFrEF, <= 40%)
Anemia due to acute blood loss
Anemia due to acute blood loss
Acute kidney injury superimposed on CKD
Acute on chronic heart failure with reduced ejection fraction (HFrEF, <= 40%)
Acute on chronic heart failure with reduced ejection fraction (HFrEF, <= 40%)
Acute on chronic respiratory failure with hypoxia and hypercapnia
ESRD on hemodialysis
Acute on chronic respiratory failure with hypoxia and hypercapnia
Acute on chronic heart failure with reduced ejection fraction (HFrEF, <= 40%)
Acute kidney injury superimposed on CKD
Acute on chronic heart failure with reduced ejection fraction (HFrEF, <= 40%)
Anemia due to acute blood loss
Acute kidney injury superimposed on CKD
Acute on chronic heart failure with reduced ejection fraction (HFrEF, <= 40%)
Acute kidney injury superimposed on CKD
Acute on chronic heart failure with reduced ejection fraction (HFrEF, <= 40%)
Anemia due to acute blood loss
Acute kidney injury superimposed on CKD
Acute on chronic heart failure with reduced ejection fraction (HFrEF, <= 40%)
Acute on chronic respiratory failure with hypoxia and hypercapnia
Anemia due to acute blood loss
Acute on chronic heart failure with reduced ejection fraction (HFrEF, <= 40%)
Acute on chronic heart failure with reduced ejection fraction (HFrEF, <= 40%)
Anemia due to acute blood loss
Anemia due to acute blood loss
Acute on chronic heart failure with reduced ejection fraction (HFrEF, <= 40%)
Acute on chronic heart failure with reduced ejection fraction (HFrEF, <= 40%)
Acute kidney injury superimposed on CKD
Acute kidney injury superimposed on CKD
Acute on chronic heart failure with reduced ejection fraction (HFrEF, <= 40%)
Acute kidney injury superimposed on CKD
Acute on chronic heart failure with reduced ejection fraction (HFrEF, <= 40%)
Acute kidney injury superimposed on CKD
Acute kidney injury superimposed on CKD
Acute on chronic heart failure with reduced ejection fraction (HFrEF, <= 40%)
Acute on chronic heart failure with reduced ejection fraction (HFrEF, <= 40%)
Acute kidney injury superimposed on CKD
Acute on chronic heart failure with reduced ejection fraction (HFrEF, <= 40%)
Acute kidney injury superimposed on CKD
Acute on chronic heart failure with reduced ejection fraction (HFrEF, <= 40%)
Acute on chronic heart failure with reduced ejection fraction (HFrEF, <= 40%)
Acute kidney injury superimposed on CKD
Acute on chronic heart failure with reduced ejection fraction (HFrEF, <= 40%)
Acute on chronic heart failure with reduced ejection fraction (HFrEF, <= 40%)
Acute kidney injury superimposed on CKD
Acute kidney injury superimposed on CKD
Acute on chronic heart failure with reduced ejection fraction (HFrEF, <= 40%)
Anemia due to acute blood loss
Anemia due to acute blood loss
Acute kidney injury superimposed on CKD
Acute on chronic heart failure with reduced ejection fraction (HFrEF, <= 40%)
Acute on chronic heart failure with reduced ejection fraction (HFrEF, <= 40%)
Acute kidney injury superimposed on CKD
Acute on chronic heart failure with reduced ejection fraction (HFrEF, <= 40%)

## 2025-04-14 NOTE — DISCHARGE NOTE PROVIDER - PROVIDER TOKENS
PROVIDER:[TOKEN:[[id number]],FOLLOWUP:[1 week],ESTABLISHEDPATIENT:[T]],PROVIDER:[TOKEN:[[id number]],FOLLOWUP:[1 week],ESTABLISHEDPATIENT:[T]]

## 2025-04-14 NOTE — DISCHARGE NOTE PROVIDER - NSDCMRMEDTOKEN_GEN_ALL_CORE_FT
apixaban 2.5 mg oral tablet: 1 tab(s) orally 2 times a day  aspirin 81 mg oral tablet, chewable: 1 tab(s) chewed once a day  bisacodyl 5 mg oral delayed release tablet: 1 tab(s) orally every 12 hours As needed Constipation  budesonide-formoterol 160 mcg-4.5 mcg/inh inhalation aerosol: 2 puff(s) inhaled 2 times a day  Bumex 2 mg oral tablet: 1 tab(s) orally once a day Started in hospital l  calcium acetate 667 mg oral tablet: 1 tab(s) orally 3 times a day  Epogen 10,000 units/mL injectable solution: 10,000 unit(s) subcutaneously Monday, Wednesday, and Friday Give intradialysis  folic acid 1 mg oral tablet: 1 tab(s) orally once a day  insulin glargine 100 units/mL subcutaneous solution: 18 unit(s) subcutaneous once a day (at bedtime)  ipratropium-albuterol 0.5 mg-2.5 mg/3 mL inhalation solution: 3 milliliter(s) inhaled every 6 hours As needed -for shortness of breath and/or wheezing  Lantus 100 units/mL subcutaneous solution: 15 unit(s) subcutaneous once a day (at bedtime)  pantoprazole 40 mg oral delayed release tablet: 1 tab(s) orally every 12 hours  polyethylene glycol 3350 oral powder for reconstitution: 17 gram(s) orally 2 times a day  psyllium 3.4 g/7 g oral powder for reconstitution: 1 packet(s) orally once a day  rivaroxaban 15 mg oral tablet: 1 tab(s) orally once a day (before a meal)  sacubitril-valsartan 24 mg-26 mg oral tablet: 1 tab(s) orally 2 times a day  simethicone 80 mg oral tablet, chewable: 1 tab(s) orally every 8 hours As needed Indigestion  simvastatin 40 mg oral tablet: 1 tab(s) orally once a day (at bedtime)  sodium chloride 0.65% nasal spray: 1 spray(s) nasal 2 times a day  tamsulosin 0.4 mg oral capsule: 1 cap(s) orally once a day (at bedtime)  tiotropium 2.5 mcg/inh inhalation aerosol: 2 puff(s) inhaled once a day  Trulicity Pen 1.5 mg/0.5 mL subcutaneous solution: 1.5 milligram(s) subcutaneously once a week   allopurinol 100 mg oral tablet: 1 tab(s) orally once a day  apixaban 2.5 mg oral tablet: 1 tab(s) orally 2 times a day  aspirin 81 mg oral tablet, chewable: 1 tab(s) chewed once a day  bisacodyl 5 mg oral delayed release tablet: 1 tab(s) orally every 12 hours As needed Constipation  budesonide-formoterol 160 mcg-4.5 mcg/inh inhalation aerosol: 2 puff(s) inhaled 2 times a day  Bumex 2 mg oral tablet: 1 tab(s) orally once a day Started in hospital l  calcium acetate 667 mg oral tablet: 1 tab(s) orally 3 times a day  Epogen 10,000 units/mL injectable solution: 10,000 unit(s) subcutaneously Monday, Wednesday, and Friday Give intradialysis  folic acid 1 mg oral tablet: 1 tab(s) orally once a day  insulin glargine 100 units/mL subcutaneous solution: 18 unit(s) subcutaneous once a day (at bedtime)  insulin lispro 100 units/mL injectable solution: 3 unit(s) injectable 3 times a day (before meals)  ipratropium-albuterol 0.5 mg-2.5 mg/3 mL inhalation solution: 3 milliliter(s) inhaled every 6 hours As needed -for shortness of breath and/or wheezing  melatonin 3 mg oral tablet: 1 tab(s) orally once a day (at bedtime) As needed Insomnia  metoprolol succinate 25 mg oral tablet, extended release: 1 tab(s) orally once a day  pantoprazole 40 mg oral delayed release tablet: 1 tab(s) orally every 12 hours  polyethylene glycol 3350 oral powder for reconstitution: 17 gram(s) orally 2 times a day  psyllium 3.4 g/7 g oral powder for reconstitution: 1 packet(s) orally once a day  rivaroxaban 15 mg oral tablet: 1 tab(s) orally once a day (before a meal)  sacubitril-valsartan 24 mg-26 mg oral tablet: 1 tab(s) orally 2 times a day  simethicone 80 mg oral tablet, chewable: 1 tab(s) orally every 8 hours As needed Indigestion  simvastatin 40 mg oral tablet: 1 tab(s) orally once a day (at bedtime)  sodium chloride 0.65% nasal spray: 1 spray(s) nasal 2 times a day  tamsulosin 0.4 mg oral capsule: 1 cap(s) orally once a day (at bedtime)  tiotropium 2.5 mcg/inh inhalation aerosol: 2 puff(s) inhaled once a day

## 2025-04-14 NOTE — DISCHARGE NOTE PROVIDER - NSFOLLOWUPCLINICS_GEN_ALL_ED_FT
[facility]  Cardiology  [facility], [address] [place] [address]   Phone:   Fax:   Follow Up Time: 1 week

## 2025-04-14 NOTE — PROGRESS NOTE ADULT - PROBLEM SELECTOR PROBLEM 4
CAD (coronary artery disease)
CHF (congestive heart failure)
Leukocytosis
CAD (coronary artery disease)
Encounter for palliative care
Leukocytosis
CAD (coronary artery disease)
CHF (congestive heart failure)
Leukocytosis
CAD (coronary artery disease)
CHF (congestive heart failure)
CAD (coronary artery disease)
CAD (coronary artery disease)
CHF (congestive heart failure)
Leukocytosis
CAD (coronary artery disease)
Leukocytosis
CAD (coronary artery disease)
CAD (coronary artery disease)
Leukocytosis
DVT of upper extremity (deep vein thrombosis)
CAD (coronary artery disease)
CHF (congestive heart failure)
CHF (congestive heart failure)
Leukocytosis
CAD (coronary artery disease)
CAD (coronary artery disease)
DVT of upper extremity (deep vein thrombosis)
Leukocytosis
DVT of upper extremity (deep vein thrombosis)
CHF (congestive heart failure)
DVT of upper extremity (deep vein thrombosis)
CAD (coronary artery disease)
CAD (coronary artery disease)
Leukocytosis
CAD (coronary artery disease)
Encounter for palliative care
DVT of upper extremity (deep vein thrombosis)
CAD (coronary artery disease)
Leukocytosis
DVT of upper extremity (deep vein thrombosis)
CAD (coronary artery disease)
DVT of upper extremity (deep vein thrombosis)
CAD (coronary artery disease)
Leukocytosis
DVT of upper extremity (deep vein thrombosis)
CAD (coronary artery disease)
DVT of upper extremity (deep vein thrombosis)
CAD (coronary artery disease)
Leukocytosis

## 2025-04-14 NOTE — DISCHARGE NOTE NURSING/CASE MANAGEMENT/SOCIAL WORK - PATIENT PORTAL LINK FT
You can access the FollowMyHealth Patient Portal offered by [facility] by registering at the following website: [email or web address]. By joining [facility]’s FollowMyHealth portal, you will also be able to view your health information using other applications (apps) compatible with our system.

## 2025-04-14 NOTE — DISCHARGE NOTE PROVIDER - NSDCFUADDAPPT_GEN_ALL_CORE_FT
APPTS ARE READY TO BE MADE: [X] YES    Best Family or Patient Contact (if needed):    Additional Information about above appointments (if needed):    1: Need an appt with heart failure 1 week.    2:   3:     Other comments or requests:    APPTS ARE READY TO BE MADE: [X] YES    Best Family or Patient Contact (if needed):    Additional Information about above appointments (if needed):    1: Need an appt with heart failure 1 week.    2: Nephro  3: Pulm    Other comments or requests:    APPTS ARE READY TO BE MADE: [X] YES    Best Family or Patient Contact (if needed):    Additional Information about above appointments (if needed):    1: Need an appt with heart failure 1 week.    2: Nephro  3: Pulm    Other comments or requests:     Patient is being transferred. Caregiver will arrange follow up.

## 2025-04-14 NOTE — PROGRESS NOTE ADULT - PROBLEM SELECTOR PROBLEM 2
Acute kidney injury superimposed on CKD
Acute on chronic heart failure with reduced ejection fraction (HFrEF, <= 40%)
Anemia
Anemia due to acute blood loss
CAD (coronary artery disease)
CAD (coronary artery disease)
Acute kidney injury superimposed on CKD
Anemia due to acute blood loss
CAD (coronary artery disease)
COPD (chronic obstructive pulmonary disease)
Anemia due to acute blood loss
Anemia due to acute blood loss
Acute on chronic heart failure with reduced ejection fraction (HFrEF, <= 40%)
Anemia due to acute blood loss
Anemia due to acute blood loss
CAD (coronary artery disease)
CAD (coronary artery disease)
COPD (chronic obstructive pulmonary disease)
COPD (chronic obstructive pulmonary disease)
Acute kidney injury superimposed on CKD
Acute on chronic heart failure with reduced ejection fraction (HFrEF, <= 40%)
Anemia due to acute blood loss
Acute on chronic heart failure with reduced ejection fraction (HFrEF, <= 40%)
Anemia
Anemia
CAD (coronary artery disease)
Anemia due to acute blood loss
Anemia due to acute blood loss
Acute kidney injury superimposed on CKD
Anemia
Acute kidney injury superimposed on CKD
Acute on chronic heart failure with reduced ejection fraction (HFrEF, <= 40%)
CAD (coronary artery disease)
COPD (chronic obstructive pulmonary disease)
COPD (chronic obstructive pulmonary disease)
Acute kidney injury superimposed on CKD
Acute kidney injury superimposed on CKD
CAD (coronary artery disease)
CAD (coronary artery disease)
Acute kidney injury superimposed on CKD
Acute kidney injury superimposed on CKD
CAD (coronary artery disease)
COPD (chronic obstructive pulmonary disease)
Acute kidney injury superimposed on CKD
CAD (coronary artery disease)
COPD (chronic obstructive pulmonary disease)
Acute kidney injury superimposed on CKD
Anemia due to acute blood loss
Anemia due to acute blood loss
Acute kidney injury superimposed on CKD
Acute kidney injury superimposed on CKD
CAD (coronary artery disease)
Acute kidney injury superimposed on CKD
Acute kidney injury superimposed on CKD
Anemia due to acute blood loss
Anemia due to acute blood loss
Acute kidney injury superimposed on CKD
Anemia due to acute blood loss
Anemia due to acute blood loss
Acute kidney injury superimposed on CKD
Acute on chronic heart failure with reduced ejection fraction (HFrEF, <= 40%)
Acute kidney injury superimposed on CKD
Acute kidney injury superimposed on CKD
Acute on chronic heart failure with reduced ejection fraction (HFrEF, <= 40%)
Anemia due to acute blood loss
Acute kidney injury superimposed on CKD
Anemia due to acute blood loss
Acute kidney injury superimposed on CKD
Acute on chronic heart failure with reduced ejection fraction (HFrEF, <= 40%)
Acute kidney injury superimposed on CKD
Acute on chronic heart failure with reduced ejection fraction (HFrEF, <= 40%)

## 2025-04-14 NOTE — PROGRESS NOTE ADULT - REASON FOR ADMISSION
HF eval

## 2025-04-14 NOTE — PROGRESS NOTE ADULT - SUBJECTIVE AND OBJECTIVE BOX
Follow-up Pulm Progress Note    Seen while in HD   States wore bipap most of the night  No new respiratory events overnight.  Denies SOB/CP.     Medications:  MEDICATIONS  (STANDING):  albuterol/ipratropium for Nebulization 3 milliLiter(s) Nebulizer every 6 hours  allopurinol 100 milliGRAM(s) Oral daily  apixaban 2.5 milliGRAM(s) Oral two times a day  aspirin enteric coated 81 milliGRAM(s) Oral daily  atorvastatin 20 milliGRAM(s) Oral at bedtime  buMETAnide Injectable 2 milliGRAM(s) IV Push every 12 hours  calcium acetate 667 milliGRAM(s) Oral three times a day with meals  chlorhexidine 2% Cloths 1 Application(s) Topical daily  chlorhexidine 4% Liquid 1 Application(s) Topical <User Schedule>  dextrose 5%. 1000 milliLiter(s) (50 mL/Hr) IV Continuous <Continuous>  dextrose 5%. 1000 milliLiter(s) (100 mL/Hr) IV Continuous <Continuous>  dextrose 50% Injectable 25 Gram(s) IV Push once  dextrose 50% Injectable 12.5 Gram(s) IV Push once  dextrose 50% Injectable 25 Gram(s) IV Push once  epoetin lashawn (EPOGEN) Injectable 65388 Unit(s) IV Push <User Schedule>  fluticasone propionate/ salmeterol 250-50 MICROgram(s) Diskus 1 Dose(s) Inhalation two times a day  folic acid 1 milliGRAM(s) Oral daily  glucagon  Injectable 1 milliGRAM(s) IntraMuscular once  insulin glargine Injectable (LANTUS) 18 Unit(s) SubCutaneous at bedtime  insulin lispro (ADMELOG) corrective regimen sliding scale   SubCutaneous three times a day before meals  insulin lispro Injectable (ADMELOG) 3 Unit(s) SubCutaneous three times a day before meals  metoprolol succinate ER 25 milliGRAM(s) Oral daily  pantoprazole    Tablet 40 milliGRAM(s) Oral every 12 hours  polyethylene glycol 3350 17 Gram(s) Oral two times a day  psyllium Powder 1 Packet(s) Oral daily  sodium chloride 0.65% Nasal 1 Spray(s) Both Nostrils two times a day  tamsulosin 0.4 milliGRAM(s) Oral at bedtime  tiotropium 2.5 MICROgram(s) Inhaler 2 Puff(s) Inhalation daily    MEDICATIONS  (PRN):  acetaminophen     Tablet .. 650 milliGRAM(s) Oral every 6 hours PRN Temp greater or equal to 38C (100.4F), Mild Pain (1 - 3)  bisacodyl 5 milliGRAM(s) Oral every 12 hours PRN Constipation  dextrose Oral Gel 15 Gram(s) Oral once PRN Blood Glucose LESS THAN 70 milliGRAM(s)/deciliter  dextrose Oral Gel 15 Gram(s) Oral once PRN Blood Glucose LESS THAN 70 milliGRAM(s)/deciliter  melatonin 3 milliGRAM(s) Oral at bedtime PRN Insomnia  ondansetron Injectable 4 milliGRAM(s) IV Push every 6 hours PRN Nausea and/or Vomiting  simethicone 80 milliGRAM(s) Chew every 8 hours PRN Indigestion  sodium chloride 0.9% lock flush 10 milliLiter(s) IV Push every 1 hour PRN Pre/post blood products, medications, blood draw, and to maintain line patency          Vital Signs Last 24 Hrs  T(C): 36.3 (14 Apr 2025 07:07), Max: 37.1 (13 Apr 2025 11:56)  T(F): 97.3 (14 Apr 2025 07:07), Max: 98.8 (13 Apr 2025 11:56)  HR: 70 (14 Apr 2025 07:07) (67 - 73)  BP: 117/74 (14 Apr 2025 07:07) (93/58 - 117/74)  BP(mean): --  RR: 18 (14 Apr 2025 07:07) (18 - 18)  SpO2: 98% (14 Apr 2025 07:07) (95% - 99%)    Parameters below as of 14 Apr 2025 07:07  Patient On (Oxygen Delivery Method): nasal cannula  O2 Flow (L/min): 3                LABS:                        10.9   13.32 )-----------( 157      ( 14 Apr 2025 06:40 )             38.6     04-14    132[L]  |  96  |  49[H]  ----------------------------<  107[H]  5.0   |  21[L]  |  5.11[H]    Ca    8.5      14 Apr 2025 06:40  Phos  4.4     04-13  Mg     2.5     04-13    TPro  6.9  /  Alb  2.7[L]  /  TBili  0.6  /  DBili  x   /  AST  32  /  ALT  7[L]  /  AlkPhos  290[H]  04-14          CAPILLARY BLOOD GLUCOSE      POCT Blood Glucose.: 113 mg/dL (14 Apr 2025 06:56)      Urinalysis Basic - ( 14 Apr 2025 06:40 )    Color: x / Appearance: x / SG: x / pH: x  Gluc: 107 mg/dL / Ketone: x  / Bili: x / Urobili: x   Blood: x / Protein: x / Nitrite: x   Leuk Esterase: x / RBC: x / WBC x   Sq Epi: x / Non Sq Epi: x / Bacteria: x            Physical Examination:  PULM: Decreased BS   CVS: S1, S2 heard    RADIOLOGY REVIEWED  CXR: congestion  L effusion/atelectasis           TRANSTHORACIC ECHOCARDIOGRAM REPORT  ________________________________________________________________________________                                      _______       Pt. Name:       JO VARELA Study Date:    2/7/2025  MRN:            [id number]    YOB: 1945  Accession #:    [id number]     Age:           79 years  Account#:       [id number]  Gender:        M  Heart Rate:     63 bpm        Height:        71.00 in (180.34 cm)  Rhythm:         sinus rhythm  Weight:        242.50 lb (110.00 kg)  Blood Pressure: 106/67 mmHg   BSA/BMI:       2.29 m² / 33.82 kg/m²  ________________________________________________________________________________________  Referring Physician:    [id number] Aliyah Correa  Interpreting Physician: Jo Lin MD  Primary Sonographer:    Micah Pedraza [facility]  Fellow (Performing):    Ana M Noyola MD    CPT:                ECHO TTE WITH CON COMP W DOPP - .m;DEFINITY ECHO                      CONTRAST PER ML - .m;DEFINITY ECHO CONTRAST PER ML                      WASTED - .m  Indication(s):      Dyspnea, unspecified - R06.00  Procedure:          Transthoracic echocardiogram with 2-D, M-mode and complete                      spectral and color flow Doppler.  Ordering Location:  [facility]  Admission Status:   Inpatient  Contrast Injection: Verbal consent was obtained for injection of Ultrasonic                      Enhancing Agent following a discussion of risks and                      benefits.                      Endocardial visualization enhanced with 2 ml of Definity                      Ultrasound enhancing agent (Lot#:[id number] Exp.Date:2025-AUG                      Discarded Dose:8ml).  UEA Reaction:       Patient had no adverse reaction after injection of                      Ultrasound Enhancing Agent.  Study Information:  Image quality for this study is technically difficult.    _______________________________________________________________________________________     CONCLUSIONS:      1. Definity ultrasound enhancing agent was given for enhanced left ventricular opacification and improved delineation of the left ventricular endocardial borders.   2. Left ventricular wall thickness is normal. Left ventricular systolic function is severely decreased with an ejection fraction visually estimated at 30 %. Global left ventricular hypokinesis.   3. Multiple segmental abnormalities exist. See findings.   4. At least "moderate" mitral regurgitation.   5. 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 Trace intravalvular regurgitation.   6. Severely enlarged right ventricular cavity size and reduced right ventricular systolic function.   7. Probably severe tricuspid regurgitation.   8. Device lead is visualized in the right heart.    ________________________________________________________________________________________  FINDINGS:     Left Ventricle:  After obtaining consent, Definity ultrasound enhancing agent was given for enhanced left ventricular opacification and improved delineation of the left ventricular endocardial borders. Left ventricular wall thickness is normal. Left ventricular systolic function is severely decreased with an ejection fraction visually estimated at 30%. There is global left ventricular hypokinesis. There is no evidence of a left ventricular thrombus.  LV Wall Scoring: The entire apex is akinetic. The basal and mid anterior wall,  basal and mid anterolateral wall, basal and mid anterior septum, basal and mid  inferior septum, basal and mid inferior wall, and basal and mid inferolateral  wall are hypokinetic.          Right Ventricle:  The right ventricular cavity is severely enlarged in size and right ventricular systolic function is reduced. Tricuspid annular plane systolic excursion (TAPSE) is 1.0 cm (normal >=1.7 cm). Tricuspid annular tissue Doppler S' is 4.2 cm/s (normal >10 cm/s). A device lead is visualized in the right heart.     Left Atrium:  The left atrium is moderately dilated with an indexed volume of 43.69 ml/m².     Right Atrium:  The right atrium is severely dilated with an indexed volume of 48.50 ml/m².     Interatrial Septum:  The interatrial septum appears intact.     Aortic Valve:  A a transcatheter deployed (TAVR) is present in the aortic position. The prosthetic valve has normal function. The peak transaortic velocity is 2.00 m/s, peak transaortic gradient is 16.0 mmHg and mean transaortic gradient is 9.0 mmHg with an LVOT/aortic valve VTI ratio of 0.41. The effective orifice area is estimated at 1.99 cm² by the continuity equation. There is trace intravalvular regurgitation.     Mitral Valve:  There is at least "moderate" mitral regurgitation.     Tricuspid Valve:  There is probably severe tricuspid regurgitation. Estimated pulmonary artery systolic pressure is 52 mmHg, consistent with mild to moderate pulmonary hypertension.     Pulmonic Valve:  There is mild pulmonic regurgitation.     Aorta:  The aortic root appears normal in size.     Pericardium:  No pericardial effusion seen.     Systemic Veins:  The inferior vena cava is dilated measuring 2.35 cm in diameter, (dilated >2.1cm) with abnormal inspiratory collapse (abnormal <50%) consistent with elevated right atrial pressure (~15, range 10-20mmHg).  ____________________________________________________________________  QUANTITATIVE DATA:  Left Ventricle Measurements: (Indexed to BSA)     IVSd (2D):   1.1 cm  LVPWd (2D):  1.1 cm  LVIDd (2D):  5.3 cm  LVIDs (2D):  4.5 cm  LV Mass:     228 g  99.5 g/m²  Visualized LV EF%: 30%     MV E Vmax:    1.05 m/s  e' lateral:   4.73 cm/s  e' medial:    4.16 cm/s  E/e' lateral: 22.20  E/e' medial:  25.24  E/e' Average: 23.62    Aorta Measurements: (Normal range) (Indexed to BSA)     Ao Root d     3.60 cm (3.1 - 3.7 cm) 1.57 cm/m²  Ao Asc d, 2D: 3.60  Ao Asc prox:  3.60 cm                1.57 cm/m²  Ao Arch:      2.8 cm            Left Atrium Measurements: (Indexed to BSA)  LA Diam 2D:        5.70 cm  LA Vol s, MOD A4C: 85.80 ml.  LA Vol s, MOD A2C: 116.00 ml.  LA Vol s, MOD BP:  100.00 ml  43.69 ml/m²       Right Ventricle Measurements: Right Atrial Measurements:     TAPSE:           1.0 cm       RA Vol:            111.00 ml  RV Base (RVID1): 5.6 cm       RA Vol s, MOD A4C  111.0 ml  RV Mid (RVID2):  4.7 cm       RA Vol Index:      48.50 ml/m²                                RA Area s, MOD A4C 29.2 cm²       LVOT / RVOT/ Qp/Qs Data: (Indexed to BSA)  LVOT Diameter,s: 2.50 cm  LVOT Area:       4.91 cm²  LVOT Vmax:       0.92 m/s  LVOT Vmn:        0.622 m/s  LVOT VTI:        16.00 cm  LVOT peak grad:  3 mmHg  LVOT mean grad:  1.5 mmHg  LVOT SV:         78.5 ml   34.31 ml/m²    Aortic Valve Measurements:  AV Vmax:                2.0 m/s  AV Peak Gradient:       16.0 mmHg  AV Mean Gradient:       9.0 mmHg  AV VTI:                 39.4 cm  AV VTI Ratio:           0.41  AoV EOA, Contin:        1.99 cm²  AoV EOA, Contin i:      0.87 cm²/m²  AoV Dimensionless Index 0.41    Mitral Valve Measurements:     MV E Vmax: 1.0 m/s       Tricuspid Valve Measurements:     TV S'             4.2 cm/s  TR Vmax:          3.0 m/s  TR Peak Gradient: 36.7 mmHg  RA Pressure:      15 mmHg  PASP:             52 mmHg

## 2025-04-14 NOTE — PROGRESS NOTE ADULT - PROBLEM SELECTOR PROBLEM 3
Chronic atrial fibrillation
Acute kidney injury superimposed on CKD
Acute on chronic heart failure with reduced ejection fraction (HFrEF, <= 40%)
Chronic atrial fibrillation
Chronic atrial fibrillation
DVT of upper extremity (deep vein thrombosis)
Obstructive sleep apnea
Acute on chronic heart failure with reduced ejection fraction (HFrEF, <= 40%)
Chronic atrial fibrillation
Obstructive sleep apnea
Chronic atrial fibrillation
Chronic atrial fibrillation
Acute on chronic heart failure with reduced ejection fraction (HFrEF, <= 40%)
DVT of upper extremity (deep vein thrombosis)
Chronic atrial fibrillation
Chronic atrial fibrillation
DVT of upper extremity (deep vein thrombosis)
Obstructive sleep apnea
Chronic atrial fibrillation
DVT of upper extremity (deep vein thrombosis)
DVT of upper extremity (deep vein thrombosis)
Acute on chronic heart failure with reduced ejection fraction (HFrEF, <= 40%)
Chronic atrial fibrillation
DVT of upper extremity (deep vein thrombosis)
Acute on chronic heart failure with reduced ejection fraction (HFrEF, <= 40%)
Acute on chronic heart failure with reduced ejection fraction (HFrEF, <= 40%)
Chronic atrial fibrillation
Obstructive sleep apnea
Acute on chronic heart failure with reduced ejection fraction (HFrEF, <= 40%)
DVT of upper extremity (deep vein thrombosis)
DVT of upper extremity (deep vein thrombosis)
Obstructive sleep apnea
Acute kidney injury superimposed on CKD
Acute on chronic heart failure with reduced ejection fraction (HFrEF, <= 40%)
Chronic atrial fibrillation
Chronic atrial fibrillation
DVT of upper extremity (deep vein thrombosis)
DVT of upper extremity (deep vein thrombosis)
Acute on chronic heart failure with reduced ejection fraction (HFrEF, <= 40%)
Advanced care planning/counseling discussion
Chronic atrial fibrillation
DVT of upper extremity (deep vein thrombosis)
Acute on chronic heart failure with reduced ejection fraction (HFrEF, <= 40%)
Acute on chronic heart failure with reduced ejection fraction (HFrEF, <= 40%)
Obstructive sleep apnea
Advanced care planning/counseling discussion
Acute on chronic heart failure with reduced ejection fraction (HFrEF, <= 40%)
DVT of upper extremity (deep vein thrombosis)
Chronic atrial fibrillation
DVT of upper extremity (deep vein thrombosis)
Acute on chronic heart failure with reduced ejection fraction (HFrEF, <= 40%)
DVT of upper extremity (deep vein thrombosis)
Obstructive sleep apnea
DVT of upper extremity (deep vein thrombosis)
Acute kidney injury superimposed on CKD
Acute on chronic heart failure with reduced ejection fraction (HFrEF, <= 40%)
Acute kidney injury superimposed on CKD
Chronic atrial fibrillation
Chronic atrial fibrillation
DVT of upper extremity (deep vein thrombosis)
DVT of upper extremity (deep vein thrombosis)
Acute on chronic heart failure with reduced ejection fraction (HFrEF, <= 40%)
DVT of upper extremity (deep vein thrombosis)
Acute kidney injury superimposed on CKD
DVT of upper extremity (deep vein thrombosis)
DVT of upper extremity (deep vein thrombosis)
Acute kidney injury superimposed on CKD
DVT of upper extremity (deep vein thrombosis)
Acute kidney injury superimposed on CKD
Acute kidney injury superimposed on CKD
Acute on chronic heart failure with reduced ejection fraction (HFrEF, <= 40%)
DVT of upper extremity (deep vein thrombosis)
Acute on chronic heart failure with reduced ejection fraction (HFrEF, <= 40%)
DVT of upper extremity (deep vein thrombosis)
Acute kidney injury superimposed on CKD
Acute on chronic heart failure with reduced ejection fraction (HFrEF, <= 40%)
Acute on chronic heart failure with reduced ejection fraction (HFrEF, <= 40%)
DVT of upper extremity (deep vein thrombosis)
Acute on chronic heart failure with reduced ejection fraction (HFrEF, <= 40%)
DVT of upper extremity (deep vein thrombosis)
DVT of upper extremity (deep vein thrombosis)

## 2025-04-14 NOTE — PROGRESS NOTE ADULT - PROBLEM SELECTOR PLAN 4
- US doppler done for LUE swelling at S found non-occlusive thrombus within the brachial and basilic vein in Jan 2025  -repeat [facility] duplex LUE 2/7- resolved thrombus
S/p CABG  - Continue asa, BB, statin
- rising white count over the past couple days; also with abd distension and pain. Ddx includes severe constipation given suboptimal BM vs. less likely SBP from cardiac ascites vs. possible UTI  - UA positive, s/p meropenem  - f/u dx/tx para given large ascites on imaging, neg for SBP  - cont trending cbc
-TTE with severely decreased LV systolic function, mod MR, severely enlarge RV cavity with reduced RV systolic function, severe TR, s/p TAVR with trace intervalvular regurgitation  -Pro-BNP elevated, pt volume overloaded on exam (now improving)  -Keep O>I as tolerated  -Cards/heart failure f/u.
- S/p CABG  - ASA 81mg daily held at S due to patient having melena and requiring transfusion.   - Hgb stable and brown BMs. Will restart ASA 2/7
-TTE with severely decreased LV systolic function, mod MR, severely enlarge RV cavity with reduced RV systolic function, severe TR, s/p TAVR with trace intervalvular regurgitation  -Pro-BNP elevated, pt volume overloaded on exam (now improving)  -Keep O>I as tolerated  -Cards/heart failure f/u.
S/p CABG  - Continue asa, BB, statin
- S/p CABG  - ASA 81mg daily held at S due to patient having melena and requiring transfusion.   - Hgb stable and brown BMs. Will restart ASA 2/7
- UA positive, s/p meropenem  - para neg for SBP  - cont trending cbc,   - Blood cx, NTD
- UA positive, s/p meropenem  - para neg for SBP  - cont trending cbc,   - Blood cx, NTD
- S/p CABG  - ASA 81mg daily held at S due to patient having melena and requiring transfusion.   - Hgb stable and brown BMs. Will restart ASA 2/7
- S/p CABG  - ASA 81mg daily held at S due to patient having melena and requiring transfusion.   - Hgb stable and brown BMs. Will restart ASA 2/7
- S/p CABG  - ASA 81mg daily held at S due to patient having melena and requiring transfusion.   - Hgb stable and brown BMs. ASA restarted 2/7 but held 2/27 iso significant epistaxis  - Plan to resume ASA when epistaxis improved
- US doppler done for LUE swelling at S found non-occlusive thrombus within the brachial and basilic vein in Jan 2025  -repeat VA duplex LUE 2/7- resolved thrombus
- US doppler done for LUE swelling at S found non-occlusive thrombus within the brachial and basilic vein in Jan 2025  -repeat VA duplex LUE 2/7- resolved thrombus
Will continue to follow for GOC, family meeting planned for tomorrow.  Case discussed with Dr. Brooks.    For acute issues please page palliative team.    Ifrah Johnson MD  Geriatrics and Palliative Medicine Attending  [facility] pager: [phone]
- S/p CABG  - ASA 81mg daily held at S due to patient having melena and requiring transfusion  - Hgb stable. Restart ASA if no melena
- S/p CABG  - ASA 81mg daily held at S due to patient having melena and requiring transfusion.   - Hgb stable and brown BMs. Will restart ASA 2/7
S/p CABG  - Continue asa, BB, statin
- S/p CABG  - ASA 81mg daily held at S due to patient having melena and requiring transfusion.   - Hgb stable and brown BMs. Will restart ASA 2/7
- UA positive, s/p meropenem  - para neg for SBP  - cont trending cbc,   - Blood cx, NTD
- rising white count over the past couple days; also with abd distension and pain. Ddx includes severe constipation given suboptimal BM vs. less likely SBP from cardiac ascites vs. possible UTI  - UA positive, s/p meropenem  - f/u dx/tx para given large ascites on imaging, neg for SBP  - cont trending cbc  - IR recommending ID clearance prior to permacath   - Blood cx sent prelim negative
-TTE with severely decreased LV systolic function, mod MR, severely enlarge RV cavity with reduced RV systolic function, severe TR, s/p TAVR with trace intervalvular regurgitation  -Pro-BNP elevated, pt volume overloaded on exam (now improving)  -Keep O>I as tolerated  -Cards/heart failure f/u.
- UA positive, s/p meropenem  - para neg for SBP  - cont trending cbc,   - Blood cx, NTD
- rising white count over the past couple days; also with abd distension and pain. Ddx includes severe constipation given suboptimal BM vs. less likely SBP from cardiac ascites vs. possible UTI  - UA positive, s/p meropenem  - f/u dx/tx para given large ascites on imaging, neg for SBP  - cont trending cbc,   - Blood cx, NTD
- rising white count over the past couple days; also with abd distension and pain. Ddx includes severe constipation given suboptimal BM vs. less likely SBP from cardiac ascites vs. possible UTI  - UA positive, s/p meropenem  - f/u dx/tx para given large ascites on imaging, neg for SBP  - cont trending cbc,   - Blood cx, NTD
- UA positive, s/p meropenem  - para neg for SBP  - cont trending cbc,   - Blood cx, NTD
-TTE with severely decreased LV systolic function, mod MR, severely enlarge RV cavity with reduced RV systolic function, severe TR, s/p TAVR with trace intervalvular regurgitation  -Pro-BNP elevated, pt volume overloaded on exam (now improving)  -Keep O>I as tolerated  -Cards/heart failure f/u.
- rising white count over the past couple days; also with abd distension and pain. Ddx includes severe constipation given suboptimal BM vs. less likely SBP from cardiac ascites vs. possible UTI  - UA positive, s/p meropenem  - f/u dx/tx para given large ascites on imaging, neg for SBP  - cont trending cbc  - IR recommending ID clearance prior to permacath   - Blood cx sent pending
- rising white count over the past couple days; also with abd distension and pain. Ddx includes severe constipation given suboptimal BM vs. less likely SBP from cardiac ascites vs. possible UTI  - UA positive, s/p meropenem  - f/u dx/tx para given large ascites on imaging, neg for SBP  - cont trending cbc,   - Blood cx, NTD
-TTE with severely decreased LV systolic function, mod MR, severely enlarge RV cavity with reduced RV systolic function, severe TR, s/p TAVR with trace intervalvular regurgitation  -Pro-BNP elevated, pt volume overloaded on exam (now improving)  -Keep O>I as tolerated  -Cards/heart failure f/u.
Will continue to follow for GOC.  Case discussed with Dr. Brooks.    For acute issues please page palliative team.    Ifrah Johnson MD  Geriatrics and Palliative Medicine Attending  [facility] pager: [phone]
- S/p CABG  - ASA 81mg daily held at S due to patient having melena and requiring transfusion.   - Hgb stable and brown BMs. Will restart ASA 2/7
- S/p CABG  - ASA 81mg daily held at S due to patient having melena and requiring transfusion.   - Hgb stable and brown BMs. Will restart ASA 2/7
-TTE with severely decreased LV systolic function, mod MR, severely enlarge RV cavity with reduced RV systolic function, severe TR, s/p TAVR with trace intervalvular regurgitation  -Pro-BNP elevated, pt volume overloaded on exam (now improving)  -Keep O>I as tolerated  -Cards/heart failure f/u.
-TTE with severely decreased LV systolic function, mod MR, severely enlarge RV cavity with reduced RV systolic function, severe TR, s/p TAVR with trace intervalvular regurgitation  -Pro-BNP elevated, pt volume overloaded on exam (now improving)  -Keep O>I as tolerated  -Cards/heart failure f/u.
- UA positive, s/p meropenem  - para neg for SBP  - cont trending cbc,   - Blood cx, NTD
S/p CABG  - Continue asa, BB, statin
S/p CABG  - Continue asa, BB, statin
- US doppler done for LUE swelling at S found non-occlusive thrombus within the brachial and basilic vein in Jan 2025  -repeat VA duplex LUE 2/7- resolved thrombus
- S/p CABG  - ASA 81mg daily held at S due to patient having melena and requiring transfusion.   - Hgb stable and brown BMs. Will restart ASA 2/7
- UA positive, s/p meropenem  - para neg for SBP  - cont trending cbc,   - Blood cx, NTD
S/p CABG  - Continue asa, BB, statin
- S/p CABG  - ASA 81mg daily held at S due to patient having melena and requiring transfusion.   - Hgb stable and brown BMs. Will restart ASA 2/7
- US doppler done for LUE swelling at S found non-occlusive thrombus within the brachial and basilic vein in Jan 2025  -repeat VA duplex LUE 2/7- resolved thrombus
- S/p CABG  - ASA 81mg daily held at S due to patient having melena and requiring transfusion.   - Hgb stable and brown BMs. Will restart ASA 2/7
- rising white count over the past couple days; also with abd distension and pain. Ddx includes severe constipation given suboptimal BM vs. less likely SBP from cardiac ascites vs. possible UTI  - UA positive, s/p meropenem  - f/u dx/tx para given large ascites on imaging, neg for SBP  - cont trending cbc,   - Blood cx, NTD
S/p CABG  - ASA 81mg daily held at S due to patient having melena and requiring transfusion.   - Hgb stable and brown BMs. ASA restarted 2/7 but held 2/27 iso significant epistaxis  - Restart aspirin
S/p CABG  - Continue asa, BB, statin
S/p CABG  - Continue asa, BB, statin
- S/p CABG  - ASA 81mg daily held at S due to patient having melena and requiring transfusion.   - Hgb stable and brown BMs. Will restart ASA 2/7
S/p CABG  - Continue asa, BB, statin
- S/p CABG  - ASA 81mg daily held at S due to patient having melena and requiring transfusion.   - Hgb stable and brown BMs. ASA restarted 2/7 but held 2/27 iso significant epistaxis  - Plan to resume ASA when epistaxis improved
- US doppler done for LUE swelling at S found non-occlusive thrombus within the brachial and basilic vein in Jan 2025  -repeat VA duplex LUE 2/7- resolved thrombus
- rising white count over the past couple days; also with abd distension and pain. Ddx includes severe constipation given suboptimal BM vs. less likely SBP from cardiac ascites vs. possible UTI  - UA positive, s/p meropenem  - f/u dx/tx para given large ascites on imaging, neg for SBP  - cont trending cbc, downtrending now  - Blood cx, NTD
- US doppler done for LUE swelling at S found non-occlusive thrombus within the brachial and basilic vein in Jan 2025  -repeat VA duplex LUE 2/7- resolved thrombus
S/p CABG  - Continue asa, BB, statin
- S/p CABG  - ASA 81mg daily held at S due to patient having melena and requiring transfusion.   - Hgb stable and brown BMs. Will restart ASA 2/7
- S/p CABG  - ASA 81mg daily held at S due to patient having melena and requiring transfusion.   - Hgb stable and brown BMs. Will restart ASA 2/7
S/p CABG  - Continue asa, BB, statin
- S/p CABG  - ASA 81mg daily held at S due to patient having melena and requiring transfusion.   - Hgb stable and brown BMs. Will restart ASA 2/7
- UA positive, s/p meropenem  - para neg for SBP  - cont trending cbc,   - Blood cx, NTD
- UA positive, s/p meropenem  - para neg for SBP  - cont trending cbc,   - Blood cx, NTD
- US doppler done for LUE swelling at S found non-occlusive thrombus within the brachial and basilic vein in Jan 2025  -repeat VA duplex LUE 2/7- resolved thrombus
- UA positive, s/p meropenem  - para neg for SBP  - cont trending cbc,   - Blood cx, NTD
- S/p CABG  - ASA 81mg daily held at S due to patient having melena and requiring transfusion.   - Hgb stable and brown BMs. Will restart ASA 2/7
- US doppler done for LUE swelling at S found non-occlusive thrombus within the brachial and basilic vein in Jan 2025  -repeat VA duplex LUE 2/7- resolved thrombus
- rising white count over the past couple days; also with abd distension and pain. Ddx includes severe constipation given suboptimal BM vs. less likely SBP from cardiac ascites vs. possible UTI  - UA positive, s/p meropenem  - f/u dx/tx para given large ascites on imaging, neg for SBP  - cont trending cbc
S/p CABG  - Continue asa, BB, statin
- S/p CABG  - ASA 81mg daily held at S due to patient having melena and requiring transfusion.   - Hgb stable and brown BMs. ASA restarted 2/7
- UA positive, s/p meropenem  - para neg for SBP  - cont trending cbc,   - Blood cx, NTD
- rising white count over the past couple days; also with abd distension and pain. Ddx includes severe constipation given suboptimal BM vs. less likely SBP from cardiac ascites vs. possible UTI  - UA positive, s/p meropenem  - f/u dx/tx para given large ascites on imaging, neg for SBP  - cont trending cbc
S/p CABG  - Continue asa, BB, statin

## 2025-04-14 NOTE — PROGRESS NOTE ADULT - PROBLEM SELECTOR PLAN 1
2nd to underlying COPD. Exacerbated in the setting of volume overload   -CXR with congestion, L effusion/atelectasis  -Keep O>I with HD, diuresis as tolerated  -Continue bronchodilators  -ABGs reviewed. Pt w/ hx EMBER on CPAP at home, failed CPAP this admission and now escalated to Bipap   -Bipap not worn overnight 4/2-4/3, CO2 84 in AM on 4/3. CO2 lowered by 7 points to 77 after several hours on bipap and then lowered further to 64 later in the day after settings adjusted.   -ABG 4/5 reflective of Bipap 18/7/30%. Continue Bipap qHS and PRN daytime   -EMBER and CPAP therapy have been considered and ruled out as EMBER is not the predominant cause of his pulmonary limitations, nor his daytime hypercapnia nor his nocturnal hypercapnia. Pt will need Bipap on discharge.  -ABG 4/11 acceptable  -Pt educated on compliance qHS.   -Keep sats >90% with O2

## 2025-04-14 NOTE — PROGRESS NOTE ADULT - PROBLEM SELECTOR PLAN 5
- ECG at [facility] personally reviewed, V-paced rhythm seen  - C/w Xarelto 15mg daily
-Now on HD  -Per renal.
RRT/code stroke this morning for lethargic, and is not following commands, new L facial droop. Please see RRT/chart notes. Patient had CTH/ CTA Motion degraded exam; within these constraints, no CT perfusion evidence of acute core infarct. No large vessel occlusion. He was noted to have hypercardia on blood gas and was placed on bipap with improvement in pC02 and mental status   -c/w bipap  -diuretics as above  -HD per renal  -EEG pending  -Can consider MRI if necessary, but would have to check If his device is MRI compatible.   -f/u labs  -f/u neuro recs
-Now on HD  -Per renal.
-Now on HD  -Per renal.
- ECG at [facility] personally reviewed, V-paced rhythm seen  - C/w Xarelto 15mg daily
- US doppler done for LUE swelling at S found non-occlusive thrombus within the brachial and basilic vein in Jan 2025  -repeat VA duplex LUE 2/7- resolved thrombus
- 2nd to underlying EMBER and likely OHS, COPD. Exacerbated in the setting of volume overload   - c/w IV diuresis   - c/w bipap
- ECG at [facility] personally reviewed, V-paced rhythm seen  - C/w Xarelto 15mg daily
- ECG at [facility] personally reviewed, V-paced rhythm seen  - C/w Xarelto 15mg daily
- ECG at [facility] personally reviewed, V-paced rhythm seen  - C/w Xarelto 15mg daily
- ECG at [facility] personally reviewed, V-paced rhythm seen  - C/w Xarelto 15mg daily
-Now on HD  -Per renal.
S/p CABG  - Continue asa, BB, statin
- ECG at [facility] personally reviewed, V-paced rhythm seen  - C/w Xarelto 15mg daily
- US doppler done for LUE swelling at S found non-occlusive thrombus within the brachial and basilic vein in Jan 2025  -repeat VA duplex LUE 2/7- resolved thrombus
- ECG at [facility] personally reviewed, V-paced rhythm seen  - C/w Xarelto 15mg daily
- 2nd to underlying EMBER and likely OHS, COPD. Exacerbated in the setting of volume overload   - c/w IV diuresis   - c/w bipap  - Check bedside spirometry
- ECG at [facility] personally reviewed, V-paced rhythm seen  - C/w Xarelto 15mg daily  - will spread out xarelto/aspirin given hemoptysis    #hemoptysis- likely 2/2 coughed up blood from recurrent nosebleeds.\  - CXR negative, appears unchanged to prior  - doubt PE given pt on AC  - can consider CT chest non con if recurs vs. ENT eval
- 2nd to underlying EMBER and likely OHS, COPD. Exacerbated in the setting of volume overload   - c/w IV diuresis   - c/w bipap
- ECG at [facility] personally reviewed, V-paced rhythm seen  - C/w Xarelto 15mg daily  - will spread out xarelto/aspirin given hemoptysis    #hemoptysis- likely 2/2 coughed up blood from recurrent nosebleeds.\  - CXR negative, appears unchanged to prior  - doubt PE given pt on AC  - can consider CT chest non con if recurs vs. ENT eval
-Now on HD  -Per renal.
-Now on HD  -Per renal.
-In the setting of hypercapnia   -c/w bipap  -HD per renal  -f/u labs
- ECG at [facility] personally reviewed, V-paced rhythm seen  - C/w Xarelto 15mg daily  - will spread out xarelto/aspirin given hemoptysis
- ECG at [facility] personally reviewed, V-paced rhythm seen  - C/w Xarelto 15mg daily
- ECG at [facility] personally reviewed, V-paced rhythm seen  - C/w Xarelto 15mg daily
-Now on HD  -Per renal.
S/p CABG  - Continue asa, BB, statin
- ECG at [facility] personally reviewed, V-paced rhythm seen  - C/w Xarelto 15mg daily  - will spread out xarelto/aspirin given hemoptysis    #hemoptysis- likely 2/2 coughed up blood from recurrent nosebleeds. Appears resolved.  - CXR negative, appears unchanged to prior  - doubt PE given pt on AC  - can consider CT chest non con if recurs vs. ENT eval
S/p CABG  - Continue asa, BB, statin
- ECG at [facility] personally reviewed, V-paced rhythm seen  - C/w Xarelto 15mg daily
- US doppler done for LUE swelling at S found non-occlusive thrombus within the brachial and basilic vein in Jan 2025  -repeat VA duplex LUE 2/7- resolved thrombus
- ECG at [facility] personally reviewed, V-paced rhythm seen  - C/w Xarelto 15mg daily
S/p CABG  - Continue asa, BB, statin
- ECG at [facility] personally reviewed, V-paced rhythm seen  - C/w Xarelto 15mg daily
S/p CABG  - Continue asa, BB, statin
- ECG at [facility] personally reviewed, V-paced rhythm seen  - C/w Xarelto 15mg daily
- In the setting of hypercapnia   -c/w bipap  -HD per renal  -f/u labs
- ECG at [facility] personally reviewed, V-paced rhythm seen  - C/w Xarelto 15mg daily
-In the setting of hypercapnia   -c/w bipap  -HD per renal  -f/u labs
RRT/code stroke this morning for lethargic, and is not following commands, new L facial droop. Please see RRT/chart notes. Patient had CTH/ CTA Motion degraded exam; within these constraints, no CT perfusion evidence of acute core infarct. No large vessel occlusion. He was noted to have hypercardia on blood gas and was placed on bipap with improvement in pC02 and mental status   -c/w bipap  -diuretics as above  -HD per renal  -EEG pending  -Can consider MRI if necessary, but would have to check If his device is MRI compatible.   -f/u labs  -f/u neuro recs
- ECG at [facility] personally reviewed, V-paced rhythm seen  - C/w Xarelto 15mg daily
- 2nd to underlying EMBER and likely OHS, COPD. Exacerbated in the setting of volume overload   - c/w IV diuresis   - c/w bipap
- ECG at [facility] personally reviewed, V-paced rhythm seen  - C/w Xarelto 15mg daily
-In the setting of hypercapnia   -c/w bipap  -HD per renal  -f/u labs
- ECG at [facility] personally reviewed, V-paced rhythm seen  - C/w Xarelto 15mg daily
- ECG at [facility] personally reviewed, V-paced rhythm seen  - C/w Xarelto 15mg daily
S/p CABG  - Continue asa, BB, statin
- ECG at [facility] personally reviewed, V-paced rhythm seen  - C/w Xarelto 15mg daily
- US doppler done for LUE swelling at S found non-occlusive thrombus within the brachial and basilic vein in Jan 2025  -repeat VA duplex LUE 2/7- resolved thrombus
- ECG at [facility] personally reviewed, V-paced rhythm seen  - C/w Xarelto 15mg daily
- ECG at [facility] personally reviewed, V-paced rhythm seen  - C/w Xarelto 15mg daily  - will spread out xarelto/aspirin given hemoptysis    #hemoptysis- likely 2/2 coughed up blood from recurrent nosebleeds.\  - CXR negative, appears unchanged to prior  - doubt PE given pt on AC  - can consider CT chest non con if recurs vs. ENT eval
- US doppler done for LUE swelling at S found non-occlusive thrombus within the brachial and basilic vein in Jan 2025  -repeat VA duplex LUE 2/7- resolved thrombus
- 2nd to underlying EMBER and likely OHS, COPD. Exacerbated in the setting of volume overload   - c/w IV diuresis   - c/w bipap
- ECG at [facility] personally reviewed, V-paced rhythm seen  - C/w Xarelto 15mg daily
RRT/code stroke this morning for lethargic, and is not following commands, new L facial droop. Please see RRT/chart notes. Patient had CTH/ CTA Motion degraded exam; within these constraints, no CT perfusion evidence of acute core infarct. No large vessel occlusion. He was noted to have hypercardia on blood gas and was placed on bipap with improvement in pC02 and mental status   -c/w bipap  -diuretics as above  -HD per renal  -EEG pending  -Can consider MRI if necessary, but would have to check If his device is MRI compatible.   -f/u labs  -f/u neuro recs
- 2nd to underlying EMBER and likely OHS, COPD. Exacerbated in the setting of volume overload   - c/w IV diuresis   - c/w bipap
- ECG at [facility] personally reviewed, V-paced rhythm seen  - C/w Xarelto 15mg daily  - will spread out xarelto/aspirin given hemoptysis    #hemoptysis- likely 2/2 coughed up blood from recurrent nosebleeds. Appears resolved.  - CXR negative, appears unchanged to prior  - doubt PE given pt on AC  - can consider CT chest non con if recurs vs. ENT eval
- ECG at [facility] personally reviewed, V-paced rhythm seen  - C/w Xarelto 15mg daily
- ECG at [facility] personally reviewed, V-paced rhythm seen  - C/w Xarelto 15mg daily  - will spread out xarelto/aspirin given hemoptysis    #hemoptysis- likely 2/2 coughed up blood from recurrent nosebleeds. Appears resolved.  - CXR negative, appears unchanged to prior  - doubt PE given pt on AC  - can consider CT chest non con if recurs vs. ENT eval
- ECG at [facility] personally reviewed, V-paced rhythm seen  - C/w Xarelto 15mg daily
- ECG at [facility] personally reviewed, V-paced rhythm seen  - C/w Xarelto 15mg daily
- ECG at [facility] personally reviewed, V-paced rhythm seen  - C/w Xarelto 15mg daily  - will spread out xarelto/aspirin given hemoptysis
- US doppler done for LUE swelling at S found non-occlusive thrombus within the brachial and basilic vein in Jan 2025  -repeat VA duplex LUE 2/7- resolved thrombus
RRT/code stroke this morning for lethargic, and is not following commands, new L facial droop. Please see RRT/chart notes. Patient had CTH/ CTA Motion degraded exam; within these constraints, no CT perfusion evidence of acute core infarct. No large vessel occlusion. He was noted to have hypercardia on blood gas and was placed on bipap with improvement in pC02 and mental status   -c/w bipap  -diuretics as above  -HD per renal  -EEG pending  -Can consider MRI if necessary, but would have to check If his device is MRI compatible.   -f/u labs  -f/u neuro recs
S/p CABG  - Continue asa, BB, statin
- ECG at [facility] personally reviewed, V-paced rhythm seen  - C/w Xarelto 15mg daily
- ECG at [facility] personally reviewed, V-paced rhythm seen  - C/w Xarelto 15mg daily
S/p CABG  - Continue asa, BB, statin
- ECG at [facility] personally reviewed, V-paced rhythm seen  - C/w Xarelto 15mg daily
- ECG at [facility] personally reviewed, V-paced rhythm seen  - C/w Xarelto 15mg daily

## 2025-05-21 ENCOUNTER — INPATIENT (INPATIENT)
Facility: HOSPITAL | Age: 80
LOS: 16 days | Discharge: HOME HEALTH | End: 2025-06-07
Attending: HOSPITALIST | Admitting: HOSPITALIST
Payer: MEDICARE

## 2025-05-21 VITALS
OXYGEN SATURATION: 97 % | HEIGHT: 70 IN | SYSTOLIC BLOOD PRESSURE: 106 MMHG | WEIGHT: 240.08 LBS | TEMPERATURE: 98 F | DIASTOLIC BLOOD PRESSURE: 71 MMHG | RESPIRATION RATE: 16 BRPM | HEART RATE: 100 BPM

## 2025-05-21 DIAGNOSIS — I10 ESSENTIAL (PRIMARY) HYPERTENSION: ICD-10-CM

## 2025-05-21 DIAGNOSIS — Z95.1 PRESENCE OF AORTOCORONARY BYPASS GRAFT: Chronic | ICD-10-CM

## 2025-05-21 DIAGNOSIS — N18.6 END STAGE RENAL DISEASE: ICD-10-CM

## 2025-05-21 DIAGNOSIS — I48.91 UNSPECIFIED ATRIAL FIBRILLATION: ICD-10-CM

## 2025-05-21 DIAGNOSIS — L03.116 CELLULITIS OF LEFT LOWER LIMB: ICD-10-CM

## 2025-05-21 DIAGNOSIS — I50.42 CHRONIC COMBINED SYSTOLIC (CONGESTIVE) AND DIASTOLIC (CONGESTIVE) HEART FAILURE: ICD-10-CM

## 2025-05-21 DIAGNOSIS — Z98.89 OTHER SPECIFIED POSTPROCEDURAL STATES: Chronic | ICD-10-CM

## 2025-05-21 DIAGNOSIS — E87.70 FLUID OVERLOAD, UNSPECIFIED: ICD-10-CM

## 2025-05-21 DIAGNOSIS — Z95.810 PRESENCE OF AUTOMATIC (IMPLANTABLE) CARDIAC DEFIBRILLATOR: Chronic | ICD-10-CM

## 2025-05-21 DIAGNOSIS — E78.5 HYPERLIPIDEMIA, UNSPECIFIED: ICD-10-CM

## 2025-05-21 LAB
ALBUMIN SERPL ELPH-MCNC: 2.4 G/DL — LOW (ref 3.3–5)
ALP SERPL-CCNC: 295 U/L — HIGH (ref 40–120)
ALT FLD-CCNC: 24 U/L — SIGNIFICANT CHANGE UP (ref 12–78)
ANION GAP SERPL CALC-SCNC: 8 MMOL/L — SIGNIFICANT CHANGE UP (ref 5–17)
APTT BLD: 32.2 SEC — SIGNIFICANT CHANGE UP (ref 26.1–36.8)
AST SERPL-CCNC: 46 U/L — HIGH (ref 15–37)
BASOPHILS # BLD AUTO: 0.01 K/UL — SIGNIFICANT CHANGE UP (ref 0–0.2)
BASOPHILS NFR BLD AUTO: 0.1 % — SIGNIFICANT CHANGE UP (ref 0–2)
BILIRUB SERPL-MCNC: 0.7 MG/DL — SIGNIFICANT CHANGE UP (ref 0.2–1.2)
BUN SERPL-MCNC: 40 MG/DL — HIGH (ref 7–23)
CALCIUM SERPL-MCNC: 8.8 MG/DL — SIGNIFICANT CHANGE UP (ref 8.5–10.1)
CHLORIDE SERPL-SCNC: 99 MMOL/L — SIGNIFICANT CHANGE UP (ref 96–108)
CK MB BLD-MCNC: 7.5 % — HIGH (ref 0–3.5)
CK MB CFR SERPL CALC: 2.7 NG/ML — SIGNIFICANT CHANGE UP (ref 0.5–3.6)
CK SERPL-CCNC: 36 U/L — SIGNIFICANT CHANGE UP (ref 26–308)
CO2 SERPL-SCNC: 26 MMOL/L — SIGNIFICANT CHANGE UP (ref 22–31)
CREAT SERPL-MCNC: 4.27 MG/DL — HIGH (ref 0.5–1.3)
EGFR: 13 ML/MIN/1.73M2 — LOW
EGFR: 13 ML/MIN/1.73M2 — LOW
EOSINOPHIL # BLD AUTO: 0 K/UL — SIGNIFICANT CHANGE UP (ref 0–0.5)
EOSINOPHIL NFR BLD AUTO: 0 % — SIGNIFICANT CHANGE UP (ref 0–6)
GLUCOSE BLDC GLUCOMTR-MCNC: 185 MG/DL — HIGH (ref 70–99)
GLUCOSE BLDC GLUCOMTR-MCNC: 205 MG/DL — HIGH (ref 70–99)
GLUCOSE BLDC GLUCOMTR-MCNC: 404 MG/DL — HIGH (ref 70–99)
GLUCOSE SERPL-MCNC: 193 MG/DL — HIGH (ref 70–99)
HCT VFR BLD CALC: 46.2 % — SIGNIFICANT CHANGE UP (ref 39–50)
HGB BLD-MCNC: 13.2 G/DL — SIGNIFICANT CHANGE UP (ref 13–17)
IMM GRANULOCYTES NFR BLD AUTO: 0.6 % — SIGNIFICANT CHANGE UP (ref 0–0.9)
INR BLD: 1.27 RATIO — HIGH (ref 0.85–1.16)
LIDOCAIN IGE QN: 40 U/L — SIGNIFICANT CHANGE UP (ref 13–75)
LYMPHOCYTES # BLD AUTO: 0.5 K/UL — LOW (ref 1–3.3)
LYMPHOCYTES # BLD AUTO: 4.9 % — LOW (ref 13–44)
MAGNESIUM SERPL-MCNC: 2.5 MG/DL — SIGNIFICANT CHANGE UP (ref 1.6–2.6)
MCHC RBC-ENTMCNC: 28.1 PG — SIGNIFICANT CHANGE UP (ref 27–34)
MCHC RBC-ENTMCNC: 28.6 G/DL — LOW (ref 32–36)
MCV RBC AUTO: 98.5 FL — SIGNIFICANT CHANGE UP (ref 80–100)
MONOCYTES # BLD AUTO: 0.63 K/UL — SIGNIFICANT CHANGE UP (ref 0–0.9)
MONOCYTES NFR BLD AUTO: 6.1 % — SIGNIFICANT CHANGE UP (ref 2–14)
NEUTROPHILS # BLD AUTO: 9.09 K/UL — HIGH (ref 1.8–7.4)
NEUTROPHILS NFR BLD AUTO: 88.3 % — HIGH (ref 43–77)
NRBC BLD AUTO-RTO: 0 /100 WBCS — SIGNIFICANT CHANGE UP (ref 0–0)
NT-PROBNP SERPL-SCNC: HIGH PG/ML (ref 0–450)
PLATELET # BLD AUTO: 88 K/UL — LOW (ref 150–400)
POTASSIUM SERPL-MCNC: 5 MMOL/L — SIGNIFICANT CHANGE UP (ref 3.5–5.3)
POTASSIUM SERPL-SCNC: 5 MMOL/L — SIGNIFICANT CHANGE UP (ref 3.5–5.3)
PROT SERPL-MCNC: 7.8 GM/DL — SIGNIFICANT CHANGE UP (ref 6–8.3)
PROTHROM AB SERPL-ACNC: 14.7 SEC — HIGH (ref 9.9–13.4)
RBC # BLD: 4.69 M/UL — SIGNIFICANT CHANGE UP (ref 4.2–5.8)
RBC # FLD: 30 % — HIGH (ref 10.3–14.5)
SODIUM SERPL-SCNC: 133 MMOL/L — LOW (ref 135–145)
TROPONIN I, HIGH SENSITIVITY RESULT: 124.5 NG/L — HIGH
TROPONIN I, HIGH SENSITIVITY RESULT: 144.3 NG/L — HIGH
WBC # BLD: 10.29 K/UL — SIGNIFICANT CHANGE UP (ref 3.8–10.5)
WBC # FLD AUTO: 10.29 K/UL — SIGNIFICANT CHANGE UP (ref 3.8–10.5)

## 2025-05-21 PROCEDURE — 93010 ELECTROCARDIOGRAM REPORT: CPT

## 2025-05-21 PROCEDURE — 99285 EMERGENCY DEPT VISIT HI MDM: CPT

## 2025-05-21 PROCEDURE — 99222 1ST HOSP IP/OBS MODERATE 55: CPT

## 2025-05-21 PROCEDURE — 71045 X-RAY EXAM CHEST 1 VIEW: CPT | Mod: 26

## 2025-05-21 RX ORDER — INSULIN LISPRO 100 U/ML
INJECTION, SOLUTION INTRAVENOUS; SUBCUTANEOUS AT BEDTIME
Refills: 0 | Status: DISCONTINUED | OUTPATIENT
Start: 2025-05-21 | End: 2025-06-07

## 2025-05-21 RX ORDER — APIXABAN 2.5 MG/1
2.5 TABLET, FILM COATED ORAL
Refills: 0 | Status: DISCONTINUED | OUTPATIENT
Start: 2025-05-21 | End: 2025-05-26

## 2025-05-21 RX ORDER — ACETAMINOPHEN 500 MG/5ML
650 LIQUID (ML) ORAL EVERY 6 HOURS
Refills: 0 | Status: DISCONTINUED | OUTPATIENT
Start: 2025-05-21 | End: 2025-06-07

## 2025-05-21 RX ORDER — ATORVASTATIN CALCIUM 80 MG/1
20 TABLET, FILM COATED ORAL AT BEDTIME
Refills: 0 | Status: DISCONTINUED | OUTPATIENT
Start: 2025-05-21 | End: 2025-06-07

## 2025-05-21 RX ORDER — SODIUM CHLORIDE 9 G/1000ML
1000 INJECTION, SOLUTION INTRAVENOUS
Refills: 0 | Status: DISCONTINUED | OUTPATIENT
Start: 2025-05-21 | End: 2025-06-07

## 2025-05-21 RX ORDER — METOPROLOL SUCCINATE 50 MG/1
25 TABLET, EXTENDED RELEASE ORAL DAILY
Refills: 0 | Status: DISCONTINUED | OUTPATIENT
Start: 2025-05-21 | End: 2025-05-30

## 2025-05-21 RX ORDER — TAMSULOSIN HYDROCHLORIDE 0.4 MG/1
0.4 CAPSULE ORAL AT BEDTIME
Refills: 0 | Status: DISCONTINUED | OUTPATIENT
Start: 2025-05-21 | End: 2025-05-29

## 2025-05-21 RX ORDER — MELATONIN 5 MG
3 TABLET ORAL AT BEDTIME
Refills: 0 | Status: DISCONTINUED | OUTPATIENT
Start: 2025-05-21 | End: 2025-06-07

## 2025-05-21 RX ORDER — BUMETANIDE 1 MG/1
2 TABLET ORAL
Refills: 0 | DISCHARGE

## 2025-05-21 RX ORDER — MIDODRINE HYDROCHLORIDE 5 MG/1
10 TABLET ORAL THREE TIMES A DAY
Refills: 0 | Status: DISCONTINUED | OUTPATIENT
Start: 2025-05-21 | End: 2025-05-30

## 2025-05-21 RX ORDER — INSULIN LISPRO 100 U/ML
3 INJECTION, SOLUTION INTRAVENOUS; SUBCUTANEOUS
Refills: 0 | Status: DISCONTINUED | OUTPATIENT
Start: 2025-05-21 | End: 2025-06-07

## 2025-05-21 RX ORDER — TIOTROPIUM BROMIDE INHALATION SPRAY 3.12 UG/1
2 SPRAY, METERED RESPIRATORY (INHALATION) DAILY
Refills: 0 | Status: DISCONTINUED | OUTPATIENT
Start: 2025-05-21 | End: 2025-06-07

## 2025-05-21 RX ORDER — BUMETANIDE 1 MG/1
2 TABLET ORAL
Refills: 0 | Status: DISCONTINUED | OUTPATIENT
Start: 2025-05-21 | End: 2025-05-27

## 2025-05-21 RX ORDER — DEXTROSE 50 % IN WATER 50 %
12.5 SYRINGE (ML) INTRAVENOUS ONCE
Refills: 0 | Status: DISCONTINUED | OUTPATIENT
Start: 2025-05-21 | End: 2025-06-07

## 2025-05-21 RX ORDER — INSULIN GLARGINE-YFGN 100 [IU]/ML
15 INJECTION, SOLUTION SUBCUTANEOUS AT BEDTIME
Refills: 0 | Status: DISCONTINUED | OUTPATIENT
Start: 2025-05-21 | End: 2025-05-24

## 2025-05-21 RX ORDER — CEFAZOLIN SODIUM IN 0.9 % NACL 3 G/100 ML
1000 INTRAVENOUS SOLUTION, PIGGYBACK (ML) INTRAVENOUS EVERY 24 HOURS
Refills: 0 | Status: COMPLETED | OUTPATIENT
Start: 2025-05-21 | End: 2025-05-27

## 2025-05-21 RX ORDER — FOLIC ACID 1 MG/1
1 TABLET ORAL DAILY
Refills: 0 | Status: DISCONTINUED | OUTPATIENT
Start: 2025-05-21 | End: 2025-06-07

## 2025-05-21 RX ORDER — ASPIRIN 325 MG
81 TABLET ORAL DAILY
Refills: 0 | Status: DISCONTINUED | OUTPATIENT
Start: 2025-05-21 | End: 2025-06-07

## 2025-05-21 RX ORDER — POLYETHYLENE GLYCOL 3350 17 G/17G
17 POWDER, FOR SOLUTION ORAL
Refills: 0 | Status: DISCONTINUED | OUTPATIENT
Start: 2025-05-21 | End: 2025-06-07

## 2025-05-21 RX ORDER — GLUCAGON 3 MG/1
1 POWDER NASAL ONCE
Refills: 0 | Status: DISCONTINUED | OUTPATIENT
Start: 2025-05-21 | End: 2025-06-07

## 2025-05-21 RX ORDER — DEXTROSE 50 % IN WATER 50 %
25 SYRINGE (ML) INTRAVENOUS ONCE
Refills: 0 | Status: DISCONTINUED | OUTPATIENT
Start: 2025-05-21 | End: 2025-06-07

## 2025-05-21 RX ORDER — DEXTROSE 50 % IN WATER 50 %
15 SYRINGE (ML) INTRAVENOUS ONCE
Refills: 0 | Status: DISCONTINUED | OUTPATIENT
Start: 2025-05-21 | End: 2025-06-07

## 2025-05-21 RX ORDER — INSULIN LISPRO 100 U/ML
INJECTION, SOLUTION INTRAVENOUS; SUBCUTANEOUS
Refills: 0 | Status: DISCONTINUED | OUTPATIENT
Start: 2025-05-21 | End: 2025-06-07

## 2025-05-21 RX ADMIN — INSULIN GLARGINE-YFGN 15 UNIT(S): 100 INJECTION, SOLUTION SUBCUTANEOUS at 23:38

## 2025-05-21 RX ADMIN — MIDODRINE HYDROCHLORIDE 10 MILLIGRAM(S): 5 TABLET ORAL at 21:07

## 2025-05-21 RX ADMIN — APIXABAN 2.5 MILLIGRAM(S): 2.5 TABLET, FILM COATED ORAL at 21:07

## 2025-05-21 RX ADMIN — INSULIN LISPRO 6: 100 INJECTION, SOLUTION INTRAVENOUS; SUBCUTANEOUS at 16:22

## 2025-05-21 RX ADMIN — POLYETHYLENE GLYCOL 3350 17 GRAM(S): 17 POWDER, FOR SOLUTION ORAL at 21:07

## 2025-05-21 RX ADMIN — Medication 667 MILLIGRAM(S): at 23:38

## 2025-05-21 RX ADMIN — Medication 650 MILLIGRAM(S): at 16:15

## 2025-05-21 RX ADMIN — TAMSULOSIN HYDROCHLORIDE 0.4 MILLIGRAM(S): 0.4 CAPSULE ORAL at 23:39

## 2025-05-21 RX ADMIN — BUMETANIDE 2 MILLIGRAM(S): 1 TABLET ORAL at 23:38

## 2025-05-21 RX ADMIN — Medication 100 MILLIGRAM(S): at 14:25

## 2025-05-21 RX ADMIN — ATORVASTATIN CALCIUM 20 MILLIGRAM(S): 80 TABLET, FILM COATED ORAL at 23:38

## 2025-05-22 LAB
A1C WITH ESTIMATED AVERAGE GLUCOSE RESULT: 6.6 % — HIGH (ref 4–5.6)
ALBUMIN SERPL ELPH-MCNC: 2.5 G/DL — LOW (ref 3.3–5)
ALP SERPL-CCNC: 285 U/L — HIGH (ref 40–120)
ALT FLD-CCNC: 20 U/L — SIGNIFICANT CHANGE UP (ref 12–78)
ANION GAP SERPL CALC-SCNC: 9 MMOL/L — SIGNIFICANT CHANGE UP (ref 5–17)
AST SERPL-CCNC: 24 U/L — SIGNIFICANT CHANGE UP (ref 15–37)
BILIRUB SERPL-MCNC: 0.6 MG/DL — SIGNIFICANT CHANGE UP (ref 0.2–1.2)
BUN SERPL-MCNC: 29 MG/DL — HIGH (ref 7–23)
CALCIUM SERPL-MCNC: 8.7 MG/DL — SIGNIFICANT CHANGE UP (ref 8.5–10.1)
CHLORIDE SERPL-SCNC: 101 MMOL/L — SIGNIFICANT CHANGE UP (ref 96–108)
CK MB BLD-MCNC: 8.1 % — HIGH (ref 0–3.5)
CK MB CFR SERPL CALC: 3.8 NG/ML — HIGH (ref 0.5–3.6)
CK SERPL-CCNC: 47 U/L — SIGNIFICANT CHANGE UP (ref 26–308)
CO2 SERPL-SCNC: 28 MMOL/L — SIGNIFICANT CHANGE UP (ref 22–31)
CREAT SERPL-MCNC: 3.41 MG/DL — HIGH (ref 0.5–1.3)
EGFR: 17 ML/MIN/1.73M2 — LOW
EGFR: 17 ML/MIN/1.73M2 — LOW
ESTIMATED AVERAGE GLUCOSE: 143 MG/DL — HIGH (ref 68–114)
GLUCOSE BLDC GLUCOMTR-MCNC: 117 MG/DL — HIGH (ref 70–99)
GLUCOSE BLDC GLUCOMTR-MCNC: 138 MG/DL — HIGH (ref 70–99)
GLUCOSE BLDC GLUCOMTR-MCNC: 139 MG/DL — HIGH (ref 70–99)
GLUCOSE SERPL-MCNC: 148 MG/DL — HIGH (ref 70–99)
HCT VFR BLD CALC: 50.6 % — HIGH (ref 39–50)
HGB BLD-MCNC: 13.9 G/DL — SIGNIFICANT CHANGE UP (ref 13–17)
MAGNESIUM SERPL-MCNC: 2.3 MG/DL — SIGNIFICANT CHANGE UP (ref 1.6–2.6)
MCHC RBC-ENTMCNC: 27.5 G/DL — LOW (ref 32–36)
MCHC RBC-ENTMCNC: 27.5 PG — SIGNIFICANT CHANGE UP (ref 27–34)
MCV RBC AUTO: 100 FL — SIGNIFICANT CHANGE UP (ref 80–100)
NRBC BLD AUTO-RTO: 0 /100 WBCS — SIGNIFICANT CHANGE UP (ref 0–0)
PHOSPHATE SERPL-MCNC: 4.7 MG/DL — HIGH (ref 2.5–4.5)
PLATELET # BLD AUTO: 92 K/UL — LOW (ref 150–400)
POTASSIUM SERPL-MCNC: 4 MMOL/L — SIGNIFICANT CHANGE UP (ref 3.5–5.3)
POTASSIUM SERPL-SCNC: 4 MMOL/L — SIGNIFICANT CHANGE UP (ref 3.5–5.3)
PROT SERPL-MCNC: 7.6 GM/DL — SIGNIFICANT CHANGE UP (ref 6–8.3)
RBC # BLD: 5.06 M/UL — SIGNIFICANT CHANGE UP (ref 4.2–5.8)
RBC # FLD: 29.6 % — HIGH (ref 10.3–14.5)
SODIUM SERPL-SCNC: 138 MMOL/L — SIGNIFICANT CHANGE UP (ref 135–145)
TROPONIN I, HIGH SENSITIVITY RESULT: 162.2 NG/L — HIGH
WBC # BLD: 11.96 K/UL — HIGH (ref 3.8–10.5)
WBC # FLD AUTO: 11.96 K/UL — HIGH (ref 3.8–10.5)

## 2025-05-22 PROCEDURE — 76705 ECHO EXAM OF ABDOMEN: CPT | Mod: 26

## 2025-05-22 PROCEDURE — 99233 SBSQ HOSP IP/OBS HIGH 50: CPT

## 2025-05-22 RX ADMIN — INSULIN LISPRO 3 UNIT(S): 100 INJECTION, SOLUTION INTRAVENOUS; SUBCUTANEOUS at 17:28

## 2025-05-22 RX ADMIN — BUMETANIDE 2 MILLIGRAM(S): 1 TABLET ORAL at 06:22

## 2025-05-22 RX ADMIN — TAMSULOSIN HYDROCHLORIDE 0.4 MILLIGRAM(S): 0.4 CAPSULE ORAL at 22:19

## 2025-05-22 RX ADMIN — FOLIC ACID 1 MILLIGRAM(S): 1 TABLET ORAL at 15:15

## 2025-05-22 RX ADMIN — POLYETHYLENE GLYCOL 3350 17 GRAM(S): 17 POWDER, FOR SOLUTION ORAL at 17:29

## 2025-05-22 RX ADMIN — Medication 1 APPLICATION(S): at 15:09

## 2025-05-22 RX ADMIN — POLYETHYLENE GLYCOL 3350 17 GRAM(S): 17 POWDER, FOR SOLUTION ORAL at 06:21

## 2025-05-22 RX ADMIN — Medication 100 MILLIGRAM(S): at 15:16

## 2025-05-22 RX ADMIN — INSULIN GLARGINE-YFGN 15 UNIT(S): 100 INJECTION, SOLUTION SUBCUTANEOUS at 22:19

## 2025-05-22 RX ADMIN — ATORVASTATIN CALCIUM 20 MILLIGRAM(S): 80 TABLET, FILM COATED ORAL at 22:19

## 2025-05-22 RX ADMIN — METOPROLOL SUCCINATE 25 MILLIGRAM(S): 50 TABLET, EXTENDED RELEASE ORAL at 06:26

## 2025-05-22 RX ADMIN — INSULIN LISPRO 3 UNIT(S): 100 INJECTION, SOLUTION INTRAVENOUS; SUBCUTANEOUS at 08:00

## 2025-05-22 RX ADMIN — Medication 667 MILLIGRAM(S): at 17:29

## 2025-05-22 RX ADMIN — Medication 1 DOSE(S): at 17:29

## 2025-05-22 RX ADMIN — APIXABAN 2.5 MILLIGRAM(S): 2.5 TABLET, FILM COATED ORAL at 17:29

## 2025-05-22 RX ADMIN — MIDODRINE HYDROCHLORIDE 10 MILLIGRAM(S): 5 TABLET ORAL at 06:24

## 2025-05-22 RX ADMIN — BUMETANIDE 2 MILLIGRAM(S): 1 TABLET ORAL at 15:17

## 2025-05-22 RX ADMIN — Medication 1 DOSE(S): at 06:24

## 2025-05-22 RX ADMIN — Medication 667 MILLIGRAM(S): at 15:16

## 2025-05-22 RX ADMIN — Medication 650 MILLIGRAM(S): at 13:43

## 2025-05-22 RX ADMIN — Medication 650 MILLIGRAM(S): at 14:43

## 2025-05-22 RX ADMIN — MIDODRINE HYDROCHLORIDE 10 MILLIGRAM(S): 5 TABLET ORAL at 17:29

## 2025-05-22 RX ADMIN — Medication 667 MILLIGRAM(S): at 07:43

## 2025-05-22 RX ADMIN — Medication 40 MILLIGRAM(S): at 06:22

## 2025-05-22 RX ADMIN — APIXABAN 2.5 MILLIGRAM(S): 2.5 TABLET, FILM COATED ORAL at 06:22

## 2025-05-22 RX ADMIN — Medication 81 MILLIGRAM(S): at 15:16

## 2025-05-23 LAB
ANION GAP SERPL CALC-SCNC: 8 MMOL/L — SIGNIFICANT CHANGE UP (ref 5–17)
BUN SERPL-MCNC: 29 MG/DL — HIGH (ref 7–23)
CALCIUM SERPL-MCNC: 9.1 MG/DL — SIGNIFICANT CHANGE UP (ref 8.5–10.1)
CHLORIDE SERPL-SCNC: 102 MMOL/L — SIGNIFICANT CHANGE UP (ref 96–108)
CO2 SERPL-SCNC: 28 MMOL/L — SIGNIFICANT CHANGE UP (ref 22–31)
CREAT SERPL-MCNC: 3.13 MG/DL — HIGH (ref 0.5–1.3)
EGFR: 19 ML/MIN/1.73M2 — LOW
EGFR: 19 ML/MIN/1.73M2 — LOW
GLUCOSE BLDC GLUCOMTR-MCNC: 111 MG/DL — HIGH (ref 70–99)
GLUCOSE BLDC GLUCOMTR-MCNC: 128 MG/DL — HIGH (ref 70–99)
GLUCOSE BLDC GLUCOMTR-MCNC: 162 MG/DL — HIGH (ref 70–99)
GLUCOSE BLDC GLUCOMTR-MCNC: 80 MG/DL — SIGNIFICANT CHANGE UP (ref 70–99)
GLUCOSE SERPL-MCNC: 76 MG/DL — SIGNIFICANT CHANGE UP (ref 70–99)
HCT VFR BLD CALC: 49 % — SIGNIFICANT CHANGE UP (ref 39–50)
HGB BLD-MCNC: 13.6 G/DL — SIGNIFICANT CHANGE UP (ref 13–17)
MCHC RBC-ENTMCNC: 27.8 G/DL — LOW (ref 32–36)
MCHC RBC-ENTMCNC: 27.9 PG — SIGNIFICANT CHANGE UP (ref 27–34)
MCV RBC AUTO: 100.4 FL — HIGH (ref 80–100)
MRSA PCR RESULT.: SIGNIFICANT CHANGE UP
NRBC BLD AUTO-RTO: 0 /100 WBCS — SIGNIFICANT CHANGE UP (ref 0–0)
PLATELET # BLD AUTO: 99 K/UL — LOW (ref 150–400)
POTASSIUM SERPL-MCNC: 4 MMOL/L — SIGNIFICANT CHANGE UP (ref 3.5–5.3)
POTASSIUM SERPL-SCNC: 4 MMOL/L — SIGNIFICANT CHANGE UP (ref 3.5–5.3)
RBC # BLD: 4.88 M/UL — SIGNIFICANT CHANGE UP (ref 4.2–5.8)
RBC # FLD: 29.4 % — HIGH (ref 10.3–14.5)
S AUREUS DNA NOSE QL NAA+PROBE: DETECTED
SODIUM SERPL-SCNC: 138 MMOL/L — SIGNIFICANT CHANGE UP (ref 135–145)
WBC # BLD: 10.27 K/UL — SIGNIFICANT CHANGE UP (ref 3.8–10.5)
WBC # FLD AUTO: 10.27 K/UL — SIGNIFICANT CHANGE UP (ref 3.8–10.5)

## 2025-05-23 PROCEDURE — 99233 SBSQ HOSP IP/OBS HIGH 50: CPT

## 2025-05-23 RX ADMIN — APIXABAN 2.5 MILLIGRAM(S): 2.5 TABLET, FILM COATED ORAL at 17:13

## 2025-05-23 RX ADMIN — FOLIC ACID 1 MILLIGRAM(S): 1 TABLET ORAL at 11:59

## 2025-05-23 RX ADMIN — Medication 667 MILLIGRAM(S): at 12:00

## 2025-05-23 RX ADMIN — INSULIN GLARGINE-YFGN 15 UNIT(S): 100 INJECTION, SOLUTION SUBCUTANEOUS at 22:02

## 2025-05-23 RX ADMIN — BUMETANIDE 2 MILLIGRAM(S): 1 TABLET ORAL at 14:38

## 2025-05-23 RX ADMIN — Medication 1 APPLICATION(S): at 14:54

## 2025-05-23 RX ADMIN — ATORVASTATIN CALCIUM 20 MILLIGRAM(S): 80 TABLET, FILM COATED ORAL at 22:02

## 2025-05-23 RX ADMIN — TIOTROPIUM BROMIDE INHALATION SPRAY 2 PUFF(S): 3.12 SPRAY, METERED RESPIRATORY (INHALATION) at 11:28

## 2025-05-23 RX ADMIN — METOPROLOL SUCCINATE 25 MILLIGRAM(S): 50 TABLET, EXTENDED RELEASE ORAL at 06:05

## 2025-05-23 RX ADMIN — Medication 1 DOSE(S): at 17:26

## 2025-05-23 RX ADMIN — INSULIN LISPRO 3 UNIT(S): 100 INJECTION, SOLUTION INTRAVENOUS; SUBCUTANEOUS at 17:13

## 2025-05-23 RX ADMIN — BUMETANIDE 2 MILLIGRAM(S): 1 TABLET ORAL at 06:05

## 2025-05-23 RX ADMIN — MIDODRINE HYDROCHLORIDE 10 MILLIGRAM(S): 5 TABLET ORAL at 12:00

## 2025-05-23 RX ADMIN — Medication 667 MILLIGRAM(S): at 08:33

## 2025-05-23 RX ADMIN — POLYETHYLENE GLYCOL 3350 17 GRAM(S): 17 POWDER, FOR SOLUTION ORAL at 06:07

## 2025-05-23 RX ADMIN — Medication 100 MILLIGRAM(S): at 14:38

## 2025-05-23 RX ADMIN — Medication 81 MILLIGRAM(S): at 12:00

## 2025-05-23 RX ADMIN — Medication 667 MILLIGRAM(S): at 17:13

## 2025-05-23 RX ADMIN — MIDODRINE HYDROCHLORIDE 10 MILLIGRAM(S): 5 TABLET ORAL at 17:14

## 2025-05-23 RX ADMIN — POLYETHYLENE GLYCOL 3350 17 GRAM(S): 17 POWDER, FOR SOLUTION ORAL at 17:14

## 2025-05-23 RX ADMIN — APIXABAN 2.5 MILLIGRAM(S): 2.5 TABLET, FILM COATED ORAL at 06:06

## 2025-05-23 RX ADMIN — MIDODRINE HYDROCHLORIDE 10 MILLIGRAM(S): 5 TABLET ORAL at 06:06

## 2025-05-23 RX ADMIN — TAMSULOSIN HYDROCHLORIDE 0.4 MILLIGRAM(S): 0.4 CAPSULE ORAL at 22:01

## 2025-05-23 RX ADMIN — INSULIN LISPRO 1: 100 INJECTION, SOLUTION INTRAVENOUS; SUBCUTANEOUS at 17:13

## 2025-05-23 RX ADMIN — Medication 1 DOSE(S): at 06:06

## 2025-05-23 RX ADMIN — Medication 40 MILLIGRAM(S): at 06:06

## 2025-05-24 LAB
GLUCOSE BLDC GLUCOMTR-MCNC: 121 MG/DL — HIGH (ref 70–99)
GLUCOSE BLDC GLUCOMTR-MCNC: 136 MG/DL — HIGH (ref 70–99)
GLUCOSE BLDC GLUCOMTR-MCNC: 60 MG/DL — LOW (ref 70–99)
GLUCOSE BLDC GLUCOMTR-MCNC: 68 MG/DL — LOW (ref 70–99)
GLUCOSE BLDC GLUCOMTR-MCNC: 87 MG/DL — SIGNIFICANT CHANGE UP (ref 70–99)
GLUCOSE BLDC GLUCOMTR-MCNC: 87 MG/DL — SIGNIFICANT CHANGE UP (ref 70–99)

## 2025-05-24 PROCEDURE — 99232 SBSQ HOSP IP/OBS MODERATE 35: CPT

## 2025-05-24 RX ORDER — INSULIN GLARGINE-YFGN 100 [IU]/ML
10 INJECTION, SOLUTION SUBCUTANEOUS AT BEDTIME
Refills: 0 | Status: DISCONTINUED | OUTPATIENT
Start: 2025-05-24 | End: 2025-06-07

## 2025-05-24 RX ADMIN — MIDODRINE HYDROCHLORIDE 10 MILLIGRAM(S): 5 TABLET ORAL at 17:13

## 2025-05-24 RX ADMIN — Medication 81 MILLIGRAM(S): at 11:05

## 2025-05-24 RX ADMIN — POLYETHYLENE GLYCOL 3350 17 GRAM(S): 17 POWDER, FOR SOLUTION ORAL at 06:25

## 2025-05-24 RX ADMIN — Medication 667 MILLIGRAM(S): at 08:07

## 2025-05-24 RX ADMIN — Medication 1 DOSE(S): at 17:14

## 2025-05-24 RX ADMIN — TAMSULOSIN HYDROCHLORIDE 0.4 MILLIGRAM(S): 0.4 CAPSULE ORAL at 22:13

## 2025-05-24 RX ADMIN — ATORVASTATIN CALCIUM 20 MILLIGRAM(S): 80 TABLET, FILM COATED ORAL at 22:13

## 2025-05-24 RX ADMIN — MIDODRINE HYDROCHLORIDE 10 MILLIGRAM(S): 5 TABLET ORAL at 11:05

## 2025-05-24 RX ADMIN — Medication 667 MILLIGRAM(S): at 17:13

## 2025-05-24 RX ADMIN — Medication 1 DOSE(S): at 06:24

## 2025-05-24 RX ADMIN — FOLIC ACID 1 MILLIGRAM(S): 1 TABLET ORAL at 11:05

## 2025-05-24 RX ADMIN — TIOTROPIUM BROMIDE INHALATION SPRAY 2 PUFF(S): 3.12 SPRAY, METERED RESPIRATORY (INHALATION) at 11:09

## 2025-05-24 RX ADMIN — APIXABAN 2.5 MILLIGRAM(S): 2.5 TABLET, FILM COATED ORAL at 06:24

## 2025-05-24 RX ADMIN — METOPROLOL SUCCINATE 25 MILLIGRAM(S): 50 TABLET, EXTENDED RELEASE ORAL at 06:24

## 2025-05-24 RX ADMIN — Medication 40 MILLIGRAM(S): at 06:24

## 2025-05-24 RX ADMIN — INSULIN LISPRO 3 UNIT(S): 100 INJECTION, SOLUTION INTRAVENOUS; SUBCUTANEOUS at 17:17

## 2025-05-24 RX ADMIN — INSULIN GLARGINE-YFGN 10 UNIT(S): 100 INJECTION, SOLUTION SUBCUTANEOUS at 22:13

## 2025-05-24 RX ADMIN — MIDODRINE HYDROCHLORIDE 10 MILLIGRAM(S): 5 TABLET ORAL at 06:25

## 2025-05-24 RX ADMIN — POLYETHYLENE GLYCOL 3350 17 GRAM(S): 17 POWDER, FOR SOLUTION ORAL at 17:13

## 2025-05-24 RX ADMIN — Medication 1 APPLICATION(S): at 11:11

## 2025-05-24 RX ADMIN — BUMETANIDE 2 MILLIGRAM(S): 1 TABLET ORAL at 06:24

## 2025-05-24 RX ADMIN — BUMETANIDE 2 MILLIGRAM(S): 1 TABLET ORAL at 15:25

## 2025-05-24 RX ADMIN — Medication 100 MILLIGRAM(S): at 15:25

## 2025-05-24 RX ADMIN — INSULIN LISPRO 3 UNIT(S): 100 INJECTION, SOLUTION INTRAVENOUS; SUBCUTANEOUS at 11:08

## 2025-05-24 RX ADMIN — APIXABAN 2.5 MILLIGRAM(S): 2.5 TABLET, FILM COATED ORAL at 17:13

## 2025-05-24 RX ADMIN — Medication 667 MILLIGRAM(S): at 12:34

## 2025-05-25 LAB
GLUCOSE BLDC GLUCOMTR-MCNC: 106 MG/DL — HIGH (ref 70–99)
GLUCOSE BLDC GLUCOMTR-MCNC: 129 MG/DL — HIGH (ref 70–99)
GLUCOSE BLDC GLUCOMTR-MCNC: 156 MG/DL — HIGH (ref 70–99)
GLUCOSE BLDC GLUCOMTR-MCNC: 157 MG/DL — HIGH (ref 70–99)

## 2025-05-25 PROCEDURE — 99232 SBSQ HOSP IP/OBS MODERATE 35: CPT

## 2025-05-25 RX ADMIN — BUMETANIDE 2 MILLIGRAM(S): 1 TABLET ORAL at 13:13

## 2025-05-25 RX ADMIN — METOPROLOL SUCCINATE 25 MILLIGRAM(S): 50 TABLET, EXTENDED RELEASE ORAL at 05:07

## 2025-05-25 RX ADMIN — POLYETHYLENE GLYCOL 3350 17 GRAM(S): 17 POWDER, FOR SOLUTION ORAL at 17:08

## 2025-05-25 RX ADMIN — MIDODRINE HYDROCHLORIDE 10 MILLIGRAM(S): 5 TABLET ORAL at 11:14

## 2025-05-25 RX ADMIN — POLYETHYLENE GLYCOL 3350 17 GRAM(S): 17 POWDER, FOR SOLUTION ORAL at 05:07

## 2025-05-25 RX ADMIN — INSULIN LISPRO 3 UNIT(S): 100 INJECTION, SOLUTION INTRAVENOUS; SUBCUTANEOUS at 11:28

## 2025-05-25 RX ADMIN — ATORVASTATIN CALCIUM 20 MILLIGRAM(S): 80 TABLET, FILM COATED ORAL at 21:31

## 2025-05-25 RX ADMIN — INSULIN LISPRO 1: 100 INJECTION, SOLUTION INTRAVENOUS; SUBCUTANEOUS at 11:28

## 2025-05-25 RX ADMIN — Medication 81 MILLIGRAM(S): at 11:13

## 2025-05-25 RX ADMIN — BUMETANIDE 2 MILLIGRAM(S): 1 TABLET ORAL at 05:06

## 2025-05-25 RX ADMIN — Medication 100 MILLIGRAM(S): at 13:13

## 2025-05-25 RX ADMIN — INSULIN GLARGINE-YFGN 10 UNIT(S): 100 INJECTION, SOLUTION SUBCUTANEOUS at 21:32

## 2025-05-25 RX ADMIN — APIXABAN 2.5 MILLIGRAM(S): 2.5 TABLET, FILM COATED ORAL at 05:06

## 2025-05-25 RX ADMIN — Medication 667 MILLIGRAM(S): at 11:14

## 2025-05-25 RX ADMIN — APIXABAN 2.5 MILLIGRAM(S): 2.5 TABLET, FILM COATED ORAL at 17:08

## 2025-05-25 RX ADMIN — FOLIC ACID 1 MILLIGRAM(S): 1 TABLET ORAL at 11:13

## 2025-05-25 RX ADMIN — MIDODRINE HYDROCHLORIDE 10 MILLIGRAM(S): 5 TABLET ORAL at 05:07

## 2025-05-25 RX ADMIN — Medication 1 DOSE(S): at 05:20

## 2025-05-25 RX ADMIN — TAMSULOSIN HYDROCHLORIDE 0.4 MILLIGRAM(S): 0.4 CAPSULE ORAL at 21:31

## 2025-05-25 RX ADMIN — Medication 667 MILLIGRAM(S): at 07:54

## 2025-05-25 RX ADMIN — Medication 1 DOSE(S): at 17:08

## 2025-05-25 RX ADMIN — INSULIN LISPRO 3 UNIT(S): 100 INJECTION, SOLUTION INTRAVENOUS; SUBCUTANEOUS at 07:54

## 2025-05-25 RX ADMIN — Medication 667 MILLIGRAM(S): at 17:08

## 2025-05-25 RX ADMIN — TIOTROPIUM BROMIDE INHALATION SPRAY 2 PUFF(S): 3.12 SPRAY, METERED RESPIRATORY (INHALATION) at 11:13

## 2025-05-25 RX ADMIN — MIDODRINE HYDROCHLORIDE 10 MILLIGRAM(S): 5 TABLET ORAL at 17:08

## 2025-05-25 RX ADMIN — Medication 40 MILLIGRAM(S): at 05:06

## 2025-05-25 RX ADMIN — INSULIN LISPRO 1: 100 INJECTION, SOLUTION INTRAVENOUS; SUBCUTANEOUS at 16:19

## 2025-05-25 RX ADMIN — INSULIN LISPRO 3 UNIT(S): 100 INJECTION, SOLUTION INTRAVENOUS; SUBCUTANEOUS at 16:18

## 2025-05-25 RX ADMIN — Medication 1 APPLICATION(S): at 11:13

## 2025-05-26 LAB
GLUCOSE BLDC GLUCOMTR-MCNC: 100 MG/DL — HIGH (ref 70–99)
GLUCOSE BLDC GLUCOMTR-MCNC: 161 MG/DL — HIGH (ref 70–99)
GLUCOSE BLDC GLUCOMTR-MCNC: 200 MG/DL — HIGH (ref 70–99)
GLUCOSE BLDC GLUCOMTR-MCNC: 241 MG/DL — HIGH (ref 70–99)

## 2025-05-26 PROCEDURE — 99232 SBSQ HOSP IP/OBS MODERATE 35: CPT

## 2025-05-26 RX ADMIN — BUMETANIDE 2 MILLIGRAM(S): 1 TABLET ORAL at 05:05

## 2025-05-26 RX ADMIN — Medication 40 MILLIGRAM(S): at 05:05

## 2025-05-26 RX ADMIN — BUMETANIDE 2 MILLIGRAM(S): 1 TABLET ORAL at 13:33

## 2025-05-26 RX ADMIN — APIXABAN 2.5 MILLIGRAM(S): 2.5 TABLET, FILM COATED ORAL at 05:05

## 2025-05-26 RX ADMIN — Medication 667 MILLIGRAM(S): at 08:03

## 2025-05-26 RX ADMIN — Medication 667 MILLIGRAM(S): at 13:07

## 2025-05-26 RX ADMIN — Medication 667 MILLIGRAM(S): at 17:31

## 2025-05-26 RX ADMIN — ATORVASTATIN CALCIUM 20 MILLIGRAM(S): 80 TABLET, FILM COATED ORAL at 21:46

## 2025-05-26 RX ADMIN — Medication 100 MILLIGRAM(S): at 13:33

## 2025-05-26 RX ADMIN — TIOTROPIUM BROMIDE INHALATION SPRAY 2 PUFF(S): 3.12 SPRAY, METERED RESPIRATORY (INHALATION) at 13:07

## 2025-05-26 RX ADMIN — MIDODRINE HYDROCHLORIDE 10 MILLIGRAM(S): 5 TABLET ORAL at 05:05

## 2025-05-26 RX ADMIN — MIDODRINE HYDROCHLORIDE 10 MILLIGRAM(S): 5 TABLET ORAL at 11:27

## 2025-05-26 RX ADMIN — Medication 1 DOSE(S): at 05:11

## 2025-05-26 RX ADMIN — INSULIN LISPRO 3 UNIT(S): 100 INJECTION, SOLUTION INTRAVENOUS; SUBCUTANEOUS at 08:03

## 2025-05-26 RX ADMIN — TAMSULOSIN HYDROCHLORIDE 0.4 MILLIGRAM(S): 0.4 CAPSULE ORAL at 21:46

## 2025-05-26 RX ADMIN — METOPROLOL SUCCINATE 25 MILLIGRAM(S): 50 TABLET, EXTENDED RELEASE ORAL at 05:05

## 2025-05-26 RX ADMIN — Medication 1 APPLICATION(S): at 11:28

## 2025-05-26 RX ADMIN — FOLIC ACID 1 MILLIGRAM(S): 1 TABLET ORAL at 11:26

## 2025-05-26 RX ADMIN — MIDODRINE HYDROCHLORIDE 10 MILLIGRAM(S): 5 TABLET ORAL at 17:31

## 2025-05-26 RX ADMIN — INSULIN LISPRO 3 UNIT(S): 100 INJECTION, SOLUTION INTRAVENOUS; SUBCUTANEOUS at 16:41

## 2025-05-26 RX ADMIN — INSULIN GLARGINE-YFGN 10 UNIT(S): 100 INJECTION, SOLUTION SUBCUTANEOUS at 21:48

## 2025-05-26 RX ADMIN — Medication 81 MILLIGRAM(S): at 11:26

## 2025-05-26 RX ADMIN — INSULIN LISPRO 3 UNIT(S): 100 INJECTION, SOLUTION INTRAVENOUS; SUBCUTANEOUS at 11:25

## 2025-05-26 RX ADMIN — INSULIN LISPRO 1: 100 INJECTION, SOLUTION INTRAVENOUS; SUBCUTANEOUS at 11:24

## 2025-05-26 RX ADMIN — Medication 1 DOSE(S): at 17:31

## 2025-05-26 RX ADMIN — INSULIN LISPRO 1: 100 INJECTION, SOLUTION INTRAVENOUS; SUBCUTANEOUS at 16:41

## 2025-05-27 ENCOUNTER — RESULT REVIEW (OUTPATIENT)
Age: 80
End: 2025-05-27

## 2025-05-27 LAB
ALBUMIN FLD-MCNC: 2 G/DL — SIGNIFICANT CHANGE UP
ALBUMIN SERPL ELPH-MCNC: 2.2 G/DL — LOW (ref 3.3–5)
ALP SERPL-CCNC: 378 U/L — HIGH (ref 40–120)
ALT FLD-CCNC: 9 U/L — LOW (ref 12–78)
ANION GAP SERPL CALC-SCNC: 9 MMOL/L — SIGNIFICANT CHANGE UP (ref 5–17)
AST SERPL-CCNC: 32 U/L — SIGNIFICANT CHANGE UP (ref 15–37)
B PERT IGG+IGM PNL SER: ABNORMAL
BILIRUB SERPL-MCNC: 0.6 MG/DL — SIGNIFICANT CHANGE UP (ref 0.2–1.2)
BUN SERPL-MCNC: 52 MG/DL — HIGH (ref 7–23)
CALCIUM SERPL-MCNC: 8.5 MG/DL — SIGNIFICANT CHANGE UP (ref 8.5–10.1)
CHLORIDE SERPL-SCNC: 101 MMOL/L — SIGNIFICANT CHANGE UP (ref 96–108)
CO2 SERPL-SCNC: 26 MMOL/L — SIGNIFICANT CHANGE UP (ref 22–31)
COLOR FLD: YELLOW — SIGNIFICANT CHANGE UP
CREAT SERPL-MCNC: 3.6 MG/DL — HIGH (ref 0.5–1.3)
EGFR: 16 ML/MIN/1.73M2 — LOW
EGFR: 16 ML/MIN/1.73M2 — LOW
FLUID INTAKE SUBSTANCE CLASS: SIGNIFICANT CHANGE UP
GLUCOSE BLDC GLUCOMTR-MCNC: 165 MG/DL — HIGH (ref 70–99)
GLUCOSE BLDC GLUCOMTR-MCNC: 210 MG/DL — HIGH (ref 70–99)
GLUCOSE BLDC GLUCOMTR-MCNC: 243 MG/DL — HIGH (ref 70–99)
GLUCOSE BLDC GLUCOMTR-MCNC: 266 MG/DL — HIGH (ref 70–99)
GLUCOSE BLDC GLUCOMTR-MCNC: 278 MG/DL — HIGH (ref 70–99)
GLUCOSE FLD-MCNC: 298 MG/DL — SIGNIFICANT CHANGE UP
GLUCOSE SERPL-MCNC: 266 MG/DL — HIGH (ref 70–99)
GRAM STN FLD: SIGNIFICANT CHANGE UP
HCT VFR BLD CALC: 47 % — SIGNIFICANT CHANGE UP (ref 39–50)
HGB BLD-MCNC: 13.3 G/DL — SIGNIFICANT CHANGE UP (ref 13–17)
INR BLD: 1.23 RATIO — HIGH (ref 0.85–1.16)
LDH SERPL L TO P-CCNC: 162 U/L — SIGNIFICANT CHANGE UP
LYMPHOCYTES # FLD: 16 % — SIGNIFICANT CHANGE UP
MCHC RBC-ENTMCNC: 27.7 PG — SIGNIFICANT CHANGE UP (ref 27–34)
MCHC RBC-ENTMCNC: 28.3 G/DL — LOW (ref 32–36)
MCV RBC AUTO: 97.9 FL — SIGNIFICANT CHANGE UP (ref 80–100)
MESOTHL CELL # FLD: SIGNIFICANT CHANGE UP
MONOS+MACROS # FLD: 43 % — SIGNIFICANT CHANGE UP
NEUTROPHILS-BODY FLUID: 41 % — SIGNIFICANT CHANGE UP
NRBC BLD AUTO-RTO: 0 /100 WBCS — SIGNIFICANT CHANGE UP (ref 0–0)
PLATELET # BLD AUTO: 159 K/UL — SIGNIFICANT CHANGE UP (ref 150–400)
POTASSIUM SERPL-MCNC: 3.9 MMOL/L — SIGNIFICANT CHANGE UP (ref 3.5–5.3)
POTASSIUM SERPL-SCNC: 3.9 MMOL/L — SIGNIFICANT CHANGE UP (ref 3.5–5.3)
PROT FLD-MCNC: 4.1 G/DL — SIGNIFICANT CHANGE UP
PROT SERPL-MCNC: 7 GM/DL — SIGNIFICANT CHANGE UP (ref 6–8.3)
PROTHROM AB SERPL-ACNC: 14.2 SEC — HIGH (ref 9.9–13.4)
RBC # BLD: 4.8 M/UL — SIGNIFICANT CHANGE UP (ref 4.2–5.8)
RBC # FLD: 28.1 % — HIGH (ref 10.3–14.5)
RCV VOL RI: 7000 CELLS/UL — HIGH
SODIUM SERPL-SCNC: 136 MMOL/L — SIGNIFICANT CHANGE UP (ref 135–145)
SPECIMEN SOURCE: SIGNIFICANT CHANGE UP
TOTAL NUCLEATED CELL COUNT, BODY FLUID: 215 CELLS/UL — SIGNIFICANT CHANGE UP
TUBE TYPE: SIGNIFICANT CHANGE UP
WBC # BLD: 10.11 K/UL — SIGNIFICANT CHANGE UP (ref 3.8–10.5)
WBC # FLD AUTO: 10.11 K/UL — SIGNIFICANT CHANGE UP (ref 3.8–10.5)
WBC COUNT.: 190 CELLS/UL — SIGNIFICANT CHANGE UP

## 2025-05-27 PROCEDURE — 88305 TISSUE EXAM BY PATHOLOGIST: CPT | Mod: 26

## 2025-05-27 PROCEDURE — 88112 CYTOPATH CELL ENHANCE TECH: CPT | Mod: 26

## 2025-05-27 PROCEDURE — 49083 ABD PARACENTESIS W/IMAGING: CPT

## 2025-05-27 PROCEDURE — 99232 SBSQ HOSP IP/OBS MODERATE 35: CPT

## 2025-05-27 RX ORDER — APIXABAN 2.5 MG/1
2.5 TABLET, FILM COATED ORAL EVERY 12 HOURS
Refills: 0 | Status: DISCONTINUED | OUTPATIENT
Start: 2025-05-27 | End: 2025-06-02

## 2025-05-27 RX ADMIN — Medication 81 MILLIGRAM(S): at 12:51

## 2025-05-27 RX ADMIN — FOLIC ACID 1 MILLIGRAM(S): 1 TABLET ORAL at 12:51

## 2025-05-27 RX ADMIN — TIOTROPIUM BROMIDE INHALATION SPRAY 2 PUFF(S): 3.12 SPRAY, METERED RESPIRATORY (INHALATION) at 19:28

## 2025-05-27 RX ADMIN — Medication 667 MILLIGRAM(S): at 08:25

## 2025-05-27 RX ADMIN — INSULIN LISPRO 3: 100 INJECTION, SOLUTION INTRAVENOUS; SUBCUTANEOUS at 08:24

## 2025-05-27 RX ADMIN — INSULIN LISPRO 1: 100 INJECTION, SOLUTION INTRAVENOUS; SUBCUTANEOUS at 22:35

## 2025-05-27 RX ADMIN — INSULIN GLARGINE-YFGN 10 UNIT(S): 100 INJECTION, SOLUTION SUBCUTANEOUS at 22:34

## 2025-05-27 RX ADMIN — Medication 1 DOSE(S): at 19:28

## 2025-05-27 RX ADMIN — INSULIN LISPRO 1: 100 INJECTION, SOLUTION INTRAVENOUS; SUBCUTANEOUS at 18:00

## 2025-05-27 RX ADMIN — MIDODRINE HYDROCHLORIDE 10 MILLIGRAM(S): 5 TABLET ORAL at 19:29

## 2025-05-27 RX ADMIN — Medication 1 DOSE(S): at 05:34

## 2025-05-27 RX ADMIN — INSULIN LISPRO 3 UNIT(S): 100 INJECTION, SOLUTION INTRAVENOUS; SUBCUTANEOUS at 17:24

## 2025-05-27 RX ADMIN — INSULIN LISPRO 2: 100 INJECTION, SOLUTION INTRAVENOUS; SUBCUTANEOUS at 12:52

## 2025-05-27 RX ADMIN — INSULIN LISPRO 3 UNIT(S): 100 INJECTION, SOLUTION INTRAVENOUS; SUBCUTANEOUS at 12:52

## 2025-05-27 RX ADMIN — Medication 100 MILLIGRAM(S): at 12:55

## 2025-05-27 RX ADMIN — METOPROLOL SUCCINATE 25 MILLIGRAM(S): 50 TABLET, EXTENDED RELEASE ORAL at 05:34

## 2025-05-27 RX ADMIN — Medication 1 APPLICATION(S): at 19:27

## 2025-05-27 RX ADMIN — ATORVASTATIN CALCIUM 20 MILLIGRAM(S): 80 TABLET, FILM COATED ORAL at 22:34

## 2025-05-27 RX ADMIN — BUMETANIDE 2 MILLIGRAM(S): 1 TABLET ORAL at 13:03

## 2025-05-27 RX ADMIN — BUMETANIDE 2 MILLIGRAM(S): 1 TABLET ORAL at 05:33

## 2025-05-27 RX ADMIN — Medication 40 MILLIGRAM(S): at 05:34

## 2025-05-27 RX ADMIN — MIDODRINE HYDROCHLORIDE 10 MILLIGRAM(S): 5 TABLET ORAL at 12:50

## 2025-05-27 RX ADMIN — INSULIN LISPRO 3 UNIT(S): 100 INJECTION, SOLUTION INTRAVENOUS; SUBCUTANEOUS at 08:24

## 2025-05-27 RX ADMIN — Medication 667 MILLIGRAM(S): at 19:28

## 2025-05-27 RX ADMIN — APIXABAN 2.5 MILLIGRAM(S): 2.5 TABLET, FILM COATED ORAL at 19:28

## 2025-05-27 RX ADMIN — Medication 667 MILLIGRAM(S): at 12:51

## 2025-05-27 RX ADMIN — TAMSULOSIN HYDROCHLORIDE 0.4 MILLIGRAM(S): 0.4 CAPSULE ORAL at 22:34

## 2025-05-28 LAB
GLUCOSE BLDC GLUCOMTR-MCNC: 135 MG/DL — HIGH (ref 70–99)
GLUCOSE BLDC GLUCOMTR-MCNC: 148 MG/DL — HIGH (ref 70–99)
GLUCOSE BLDC GLUCOMTR-MCNC: 206 MG/DL — HIGH (ref 70–99)
GLUCOSE BLDC GLUCOMTR-MCNC: 211 MG/DL — HIGH (ref 70–99)
HAV IGM SER-ACNC: SIGNIFICANT CHANGE UP
HBV CORE IGM SER-ACNC: SIGNIFICANT CHANGE UP
HBV SURFACE AG SER-ACNC: SIGNIFICANT CHANGE UP
HBV SURFACE AG SER-ACNC: SIGNIFICANT CHANGE UP
HCV AB S/CO SERPL IA: 0.12 S/CO — SIGNIFICANT CHANGE UP (ref 0–0.79)
HCV AB SERPL-IMP: SIGNIFICANT CHANGE UP

## 2025-05-28 PROCEDURE — 99232 SBSQ HOSP IP/OBS MODERATE 35: CPT

## 2025-05-28 PROCEDURE — 93971 EXTREMITY STUDY: CPT | Mod: 26,LT

## 2025-05-28 RX ORDER — ALBUMIN (HUMAN) 12.5 G/50ML
50 INJECTION, SOLUTION INTRAVENOUS ONCE
Refills: 0 | Status: COMPLETED | OUTPATIENT
Start: 2025-05-28 | End: 2025-05-28

## 2025-05-28 RX ADMIN — Medication 667 MILLIGRAM(S): at 18:57

## 2025-05-28 RX ADMIN — METOPROLOL SUCCINATE 25 MILLIGRAM(S): 50 TABLET, EXTENDED RELEASE ORAL at 05:50

## 2025-05-28 RX ADMIN — APIXABAN 2.5 MILLIGRAM(S): 2.5 TABLET, FILM COATED ORAL at 18:53

## 2025-05-28 RX ADMIN — INSULIN LISPRO 2: 100 INJECTION, SOLUTION INTRAVENOUS; SUBCUTANEOUS at 12:13

## 2025-05-28 RX ADMIN — Medication 81 MILLIGRAM(S): at 12:13

## 2025-05-28 RX ADMIN — Medication 1 DOSE(S): at 18:53

## 2025-05-28 RX ADMIN — Medication 667 MILLIGRAM(S): at 08:48

## 2025-05-28 RX ADMIN — Medication 40 MILLIGRAM(S): at 05:50

## 2025-05-28 RX ADMIN — Medication 1 DOSE(S): at 05:51

## 2025-05-28 RX ADMIN — Medication 667 MILLIGRAM(S): at 12:13

## 2025-05-28 RX ADMIN — MIDODRINE HYDROCHLORIDE 10 MILLIGRAM(S): 5 TABLET ORAL at 18:53

## 2025-05-28 RX ADMIN — POLYETHYLENE GLYCOL 3350 17 GRAM(S): 17 POWDER, FOR SOLUTION ORAL at 18:52

## 2025-05-28 RX ADMIN — INSULIN GLARGINE-YFGN 10 UNIT(S): 100 INJECTION, SOLUTION SUBCUTANEOUS at 21:53

## 2025-05-28 RX ADMIN — ALBUMIN (HUMAN) 50 MILLILITER(S): 12.5 INJECTION, SOLUTION INTRAVENOUS at 23:54

## 2025-05-28 RX ADMIN — INSULIN LISPRO 3 UNIT(S): 100 INJECTION, SOLUTION INTRAVENOUS; SUBCUTANEOUS at 12:12

## 2025-05-28 RX ADMIN — INSULIN LISPRO 3 UNIT(S): 100 INJECTION, SOLUTION INTRAVENOUS; SUBCUTANEOUS at 19:14

## 2025-05-28 RX ADMIN — MIDODRINE HYDROCHLORIDE 10 MILLIGRAM(S): 5 TABLET ORAL at 05:49

## 2025-05-28 RX ADMIN — MIDODRINE HYDROCHLORIDE 10 MILLIGRAM(S): 5 TABLET ORAL at 12:13

## 2025-05-28 RX ADMIN — ATORVASTATIN CALCIUM 20 MILLIGRAM(S): 80 TABLET, FILM COATED ORAL at 21:53

## 2025-05-28 RX ADMIN — POLYETHYLENE GLYCOL 3350 17 GRAM(S): 17 POWDER, FOR SOLUTION ORAL at 05:50

## 2025-05-28 RX ADMIN — TIOTROPIUM BROMIDE INHALATION SPRAY 2 PUFF(S): 3.12 SPRAY, METERED RESPIRATORY (INHALATION) at 12:18

## 2025-05-28 RX ADMIN — FOLIC ACID 1 MILLIGRAM(S): 1 TABLET ORAL at 12:14

## 2025-05-28 RX ADMIN — APIXABAN 2.5 MILLIGRAM(S): 2.5 TABLET, FILM COATED ORAL at 05:50

## 2025-05-28 RX ADMIN — TAMSULOSIN HYDROCHLORIDE 0.4 MILLIGRAM(S): 0.4 CAPSULE ORAL at 21:53

## 2025-05-28 RX ADMIN — Medication 1 APPLICATION(S): at 16:47

## 2025-05-28 RX ADMIN — INSULIN LISPRO 3 UNIT(S): 100 INJECTION, SOLUTION INTRAVENOUS; SUBCUTANEOUS at 08:40

## 2025-05-29 LAB
GLUCOSE BLDC GLUCOMTR-MCNC: 105 MG/DL — HIGH (ref 70–99)
GLUCOSE BLDC GLUCOMTR-MCNC: 119 MG/DL — HIGH (ref 70–99)
GLUCOSE BLDC GLUCOMTR-MCNC: 182 MG/DL — HIGH (ref 70–99)
GLUCOSE BLDC GLUCOMTR-MCNC: 199 MG/DL — HIGH (ref 70–99)

## 2025-05-29 PROCEDURE — 99232 SBSQ HOSP IP/OBS MODERATE 35: CPT

## 2025-05-29 RX ORDER — ALBUMIN (HUMAN) 12.5 G/50ML
50 INJECTION, SOLUTION INTRAVENOUS ONCE
Refills: 0 | Status: COMPLETED | OUTPATIENT
Start: 2025-05-29 | End: 2025-05-29

## 2025-05-29 RX ADMIN — Medication 40 MILLIGRAM(S): at 08:49

## 2025-05-29 RX ADMIN — MIDODRINE HYDROCHLORIDE 10 MILLIGRAM(S): 5 TABLET ORAL at 18:12

## 2025-05-29 RX ADMIN — Medication 1 DOSE(S): at 06:12

## 2025-05-29 RX ADMIN — MIDODRINE HYDROCHLORIDE 10 MILLIGRAM(S): 5 TABLET ORAL at 05:18

## 2025-05-29 RX ADMIN — Medication 1 DOSE(S): at 18:12

## 2025-05-29 RX ADMIN — Medication 1 APPLICATION(S): at 11:37

## 2025-05-29 RX ADMIN — Medication 81 MILLIGRAM(S): at 11:30

## 2025-05-29 RX ADMIN — ALBUMIN (HUMAN) 50 MILLILITER(S): 12.5 INJECTION, SOLUTION INTRAVENOUS at 09:29

## 2025-05-29 RX ADMIN — TIOTROPIUM BROMIDE INHALATION SPRAY 2 PUFF(S): 3.12 SPRAY, METERED RESPIRATORY (INHALATION) at 11:35

## 2025-05-29 RX ADMIN — Medication 667 MILLIGRAM(S): at 18:11

## 2025-05-29 RX ADMIN — ATORVASTATIN CALCIUM 20 MILLIGRAM(S): 80 TABLET, FILM COATED ORAL at 21:59

## 2025-05-29 RX ADMIN — Medication 650 MILLIGRAM(S): at 20:19

## 2025-05-29 RX ADMIN — Medication 667 MILLIGRAM(S): at 08:49

## 2025-05-29 RX ADMIN — MIDODRINE HYDROCHLORIDE 10 MILLIGRAM(S): 5 TABLET ORAL at 11:30

## 2025-05-29 RX ADMIN — FOLIC ACID 1 MILLIGRAM(S): 1 TABLET ORAL at 11:30

## 2025-05-29 RX ADMIN — INSULIN LISPRO 1: 100 INJECTION, SOLUTION INTRAVENOUS; SUBCUTANEOUS at 11:29

## 2025-05-29 RX ADMIN — INSULIN LISPRO 3 UNIT(S): 100 INJECTION, SOLUTION INTRAVENOUS; SUBCUTANEOUS at 11:29

## 2025-05-29 RX ADMIN — APIXABAN 2.5 MILLIGRAM(S): 2.5 TABLET, FILM COATED ORAL at 05:18

## 2025-05-29 RX ADMIN — APIXABAN 2.5 MILLIGRAM(S): 2.5 TABLET, FILM COATED ORAL at 18:12

## 2025-05-29 RX ADMIN — INSULIN GLARGINE-YFGN 10 UNIT(S): 100 INJECTION, SOLUTION SUBCUTANEOUS at 21:59

## 2025-05-29 RX ADMIN — Medication 667 MILLIGRAM(S): at 11:32

## 2025-05-29 RX ADMIN — POLYETHYLENE GLYCOL 3350 17 GRAM(S): 17 POWDER, FOR SOLUTION ORAL at 18:17

## 2025-05-29 RX ADMIN — Medication 650 MILLIGRAM(S): at 21:19

## 2025-05-30 LAB
ALBUMIN SERPL ELPH-MCNC: 2.3 G/DL — LOW (ref 3.3–5)
ALP SERPL-CCNC: 431 U/L — HIGH (ref 40–120)
ALT FLD-CCNC: 15 U/L — SIGNIFICANT CHANGE UP (ref 12–78)
ANION GAP SERPL CALC-SCNC: 10 MMOL/L — SIGNIFICANT CHANGE UP (ref 5–17)
AST SERPL-CCNC: 47 U/L — HIGH (ref 15–37)
BILIRUB SERPL-MCNC: 0.5 MG/DL — SIGNIFICANT CHANGE UP (ref 0.2–1.2)
BUN SERPL-MCNC: 59 MG/DL — HIGH (ref 7–23)
CALCIUM SERPL-MCNC: 8.9 MG/DL — SIGNIFICANT CHANGE UP (ref 8.5–10.1)
CHLORIDE SERPL-SCNC: 99 MMOL/L — SIGNIFICANT CHANGE UP (ref 96–108)
CO2 SERPL-SCNC: 25 MMOL/L — SIGNIFICANT CHANGE UP (ref 22–31)
CREAT SERPL-MCNC: 3.82 MG/DL — HIGH (ref 0.5–1.3)
EGFR: 15 ML/MIN/1.73M2 — LOW
EGFR: 15 ML/MIN/1.73M2 — LOW
GLUCOSE BLDC GLUCOMTR-MCNC: 142 MG/DL — HIGH (ref 70–99)
GLUCOSE BLDC GLUCOMTR-MCNC: 149 MG/DL — HIGH (ref 70–99)
GLUCOSE BLDC GLUCOMTR-MCNC: 160 MG/DL — HIGH (ref 70–99)
GLUCOSE BLDC GLUCOMTR-MCNC: 199 MG/DL — HIGH (ref 70–99)
GLUCOSE SERPL-MCNC: 160 MG/DL — HIGH (ref 70–99)
HCT VFR BLD CALC: 45.5 % — SIGNIFICANT CHANGE UP (ref 39–50)
HGB BLD-MCNC: 13.3 G/DL — SIGNIFICANT CHANGE UP (ref 13–17)
MCHC RBC-ENTMCNC: 28.1 PG — SIGNIFICANT CHANGE UP (ref 27–34)
MCHC RBC-ENTMCNC: 29.2 G/DL — LOW (ref 32–36)
MCV RBC AUTO: 96.2 FL — SIGNIFICANT CHANGE UP (ref 80–100)
NRBC BLD AUTO-RTO: 0 /100 WBCS — SIGNIFICANT CHANGE UP (ref 0–0)
PLATELET # BLD AUTO: 159 K/UL — SIGNIFICANT CHANGE UP (ref 150–400)
POTASSIUM SERPL-MCNC: 4.2 MMOL/L — SIGNIFICANT CHANGE UP (ref 3.5–5.3)
POTASSIUM SERPL-SCNC: 4.2 MMOL/L — SIGNIFICANT CHANGE UP (ref 3.5–5.3)
PROT SERPL-MCNC: 6.6 GM/DL — SIGNIFICANT CHANGE UP (ref 6–8.3)
RBC # BLD: 4.73 M/UL — SIGNIFICANT CHANGE UP (ref 4.2–5.8)
RBC # FLD: 26.8 % — HIGH (ref 10.3–14.5)
SODIUM SERPL-SCNC: 134 MMOL/L — LOW (ref 135–145)
WBC # BLD: 8.27 K/UL — SIGNIFICANT CHANGE UP (ref 3.8–10.5)
WBC # FLD AUTO: 8.27 K/UL — SIGNIFICANT CHANGE UP (ref 3.8–10.5)

## 2025-05-30 PROCEDURE — 99232 SBSQ HOSP IP/OBS MODERATE 35: CPT

## 2025-05-30 RX ORDER — MIDODRINE HYDROCHLORIDE 5 MG/1
15 TABLET ORAL THREE TIMES A DAY
Refills: 0 | Status: DISCONTINUED | OUTPATIENT
Start: 2025-05-30 | End: 2025-06-07

## 2025-05-30 RX ORDER — METOPROLOL SUCCINATE 50 MG/1
12.5 TABLET, EXTENDED RELEASE ORAL EVERY 12 HOURS
Refills: 0 | Status: DISCONTINUED | OUTPATIENT
Start: 2025-05-30 | End: 2025-06-07

## 2025-05-30 RX ADMIN — FOLIC ACID 1 MILLIGRAM(S): 1 TABLET ORAL at 11:58

## 2025-05-30 RX ADMIN — Medication 1 APPLICATION(S): at 11:58

## 2025-05-30 RX ADMIN — INSULIN LISPRO 3 UNIT(S): 100 INJECTION, SOLUTION INTRAVENOUS; SUBCUTANEOUS at 18:03

## 2025-05-30 RX ADMIN — Medication 81 MILLIGRAM(S): at 11:58

## 2025-05-30 RX ADMIN — Medication 1 DOSE(S): at 06:26

## 2025-05-30 RX ADMIN — METOPROLOL SUCCINATE 12.5 MILLIGRAM(S): 50 TABLET, EXTENDED RELEASE ORAL at 18:03

## 2025-05-30 RX ADMIN — Medication 100 MILLIGRAM(S): at 18:42

## 2025-05-30 RX ADMIN — TIOTROPIUM BROMIDE INHALATION SPRAY 2 PUFF(S): 3.12 SPRAY, METERED RESPIRATORY (INHALATION) at 11:59

## 2025-05-30 RX ADMIN — ATORVASTATIN CALCIUM 20 MILLIGRAM(S): 80 TABLET, FILM COATED ORAL at 22:42

## 2025-05-30 RX ADMIN — Medication 667 MILLIGRAM(S): at 11:58

## 2025-05-30 RX ADMIN — APIXABAN 2.5 MILLIGRAM(S): 2.5 TABLET, FILM COATED ORAL at 06:26

## 2025-05-30 RX ADMIN — METOPROLOL SUCCINATE 25 MILLIGRAM(S): 50 TABLET, EXTENDED RELEASE ORAL at 06:25

## 2025-05-30 RX ADMIN — MIDODRINE HYDROCHLORIDE 10 MILLIGRAM(S): 5 TABLET ORAL at 06:26

## 2025-05-30 RX ADMIN — Medication 1 DOSE(S): at 18:06

## 2025-05-30 RX ADMIN — Medication 667 MILLIGRAM(S): at 18:03

## 2025-05-30 RX ADMIN — INSULIN LISPRO 3 UNIT(S): 100 INJECTION, SOLUTION INTRAVENOUS; SUBCUTANEOUS at 08:24

## 2025-05-30 RX ADMIN — MIDODRINE HYDROCHLORIDE 15 MILLIGRAM(S): 5 TABLET ORAL at 18:05

## 2025-05-30 RX ADMIN — POLYETHYLENE GLYCOL 3350 17 GRAM(S): 17 POWDER, FOR SOLUTION ORAL at 06:25

## 2025-05-30 RX ADMIN — APIXABAN 2.5 MILLIGRAM(S): 2.5 TABLET, FILM COATED ORAL at 18:03

## 2025-05-30 RX ADMIN — INSULIN LISPRO 1: 100 INJECTION, SOLUTION INTRAVENOUS; SUBCUTANEOUS at 18:04

## 2025-05-30 RX ADMIN — Medication 667 MILLIGRAM(S): at 08:48

## 2025-05-30 RX ADMIN — INSULIN LISPRO 3 UNIT(S): 100 INJECTION, SOLUTION INTRAVENOUS; SUBCUTANEOUS at 11:59

## 2025-05-30 RX ADMIN — POLYETHYLENE GLYCOL 3350 17 GRAM(S): 17 POWDER, FOR SOLUTION ORAL at 18:03

## 2025-05-30 RX ADMIN — MIDODRINE HYDROCHLORIDE 15 MILLIGRAM(S): 5 TABLET ORAL at 12:17

## 2025-05-30 RX ADMIN — Medication 40 MILLIGRAM(S): at 06:26

## 2025-05-30 RX ADMIN — INSULIN GLARGINE-YFGN 10 UNIT(S): 100 INJECTION, SOLUTION SUBCUTANEOUS at 22:43

## 2025-05-31 LAB
GLUCOSE BLDC GLUCOMTR-MCNC: 104 MG/DL — HIGH (ref 70–99)
GLUCOSE BLDC GLUCOMTR-MCNC: 110 MG/DL — HIGH (ref 70–99)
GLUCOSE BLDC GLUCOMTR-MCNC: 176 MG/DL — HIGH (ref 70–99)
GLUCOSE BLDC GLUCOMTR-MCNC: 186 MG/DL — HIGH (ref 70–99)

## 2025-05-31 PROCEDURE — 99232 SBSQ HOSP IP/OBS MODERATE 35: CPT

## 2025-05-31 RX ORDER — FUROSEMIDE 10 MG/ML
40 INJECTION INTRAMUSCULAR; INTRAVENOUS DAILY
Refills: 0 | Status: DISCONTINUED | OUTPATIENT
Start: 2025-05-31 | End: 2025-06-01

## 2025-05-31 RX ADMIN — Medication 667 MILLIGRAM(S): at 08:12

## 2025-05-31 RX ADMIN — Medication 40 MILLIGRAM(S): at 06:30

## 2025-05-31 RX ADMIN — Medication 650 MILLIGRAM(S): at 08:10

## 2025-05-31 RX ADMIN — MIDODRINE HYDROCHLORIDE 15 MILLIGRAM(S): 5 TABLET ORAL at 17:06

## 2025-05-31 RX ADMIN — Medication 1 DOSE(S): at 06:31

## 2025-05-31 RX ADMIN — Medication 650 MILLIGRAM(S): at 21:47

## 2025-05-31 RX ADMIN — Medication 667 MILLIGRAM(S): at 16:38

## 2025-05-31 RX ADMIN — METOPROLOL SUCCINATE 12.5 MILLIGRAM(S): 50 TABLET, EXTENDED RELEASE ORAL at 06:30

## 2025-05-31 RX ADMIN — APIXABAN 2.5 MILLIGRAM(S): 2.5 TABLET, FILM COATED ORAL at 17:07

## 2025-05-31 RX ADMIN — Medication 650 MILLIGRAM(S): at 22:47

## 2025-05-31 RX ADMIN — MIDODRINE HYDROCHLORIDE 15 MILLIGRAM(S): 5 TABLET ORAL at 06:30

## 2025-05-31 RX ADMIN — Medication 1 DOSE(S): at 17:07

## 2025-05-31 RX ADMIN — INSULIN LISPRO 3 UNIT(S): 100 INJECTION, SOLUTION INTRAVENOUS; SUBCUTANEOUS at 16:39

## 2025-05-31 RX ADMIN — MIDODRINE HYDROCHLORIDE 15 MILLIGRAM(S): 5 TABLET ORAL at 12:22

## 2025-05-31 RX ADMIN — POLYETHYLENE GLYCOL 3350 17 GRAM(S): 17 POWDER, FOR SOLUTION ORAL at 06:31

## 2025-05-31 RX ADMIN — Medication 1 APPLICATION(S): at 12:22

## 2025-05-31 RX ADMIN — FOLIC ACID 1 MILLIGRAM(S): 1 TABLET ORAL at 12:21

## 2025-05-31 RX ADMIN — POLYETHYLENE GLYCOL 3350 17 GRAM(S): 17 POWDER, FOR SOLUTION ORAL at 17:13

## 2025-05-31 RX ADMIN — Medication 650 MILLIGRAM(S): at 08:45

## 2025-05-31 RX ADMIN — APIXABAN 2.5 MILLIGRAM(S): 2.5 TABLET, FILM COATED ORAL at 06:31

## 2025-05-31 RX ADMIN — INSULIN LISPRO 3 UNIT(S): 100 INJECTION, SOLUTION INTRAVENOUS; SUBCUTANEOUS at 12:21

## 2025-05-31 RX ADMIN — ATORVASTATIN CALCIUM 20 MILLIGRAM(S): 80 TABLET, FILM COATED ORAL at 21:47

## 2025-05-31 RX ADMIN — INSULIN LISPRO 3 UNIT(S): 100 INJECTION, SOLUTION INTRAVENOUS; SUBCUTANEOUS at 08:09

## 2025-05-31 RX ADMIN — Medication 81 MILLIGRAM(S): at 12:21

## 2025-05-31 RX ADMIN — INSULIN GLARGINE-YFGN 10 UNIT(S): 100 INJECTION, SOLUTION SUBCUTANEOUS at 21:47

## 2025-05-31 RX ADMIN — Medication 667 MILLIGRAM(S): at 12:22

## 2025-05-31 RX ADMIN — TIOTROPIUM BROMIDE INHALATION SPRAY 2 PUFF(S): 3.12 SPRAY, METERED RESPIRATORY (INHALATION) at 01:00

## 2025-05-31 RX ADMIN — INSULIN LISPRO 1: 100 INJECTION, SOLUTION INTRAVENOUS; SUBCUTANEOUS at 16:39

## 2025-06-01 LAB
CULTURE RESULTS: SIGNIFICANT CHANGE UP
GLUCOSE BLDC GLUCOMTR-MCNC: 152 MG/DL — HIGH (ref 70–99)
GLUCOSE BLDC GLUCOMTR-MCNC: 214 MG/DL — HIGH (ref 70–99)
GLUCOSE BLDC GLUCOMTR-MCNC: 214 MG/DL — HIGH (ref 70–99)
GLUCOSE BLDC GLUCOMTR-MCNC: 232 MG/DL — HIGH (ref 70–99)
GRAM STN FLD: SIGNIFICANT CHANGE UP
SPECIMEN SOURCE: SIGNIFICANT CHANGE UP

## 2025-06-01 PROCEDURE — 99232 SBSQ HOSP IP/OBS MODERATE 35: CPT

## 2025-06-01 RX ADMIN — TIOTROPIUM BROMIDE INHALATION SPRAY 2 PUFF(S): 3.12 SPRAY, METERED RESPIRATORY (INHALATION) at 12:44

## 2025-06-01 RX ADMIN — INSULIN LISPRO 1: 100 INJECTION, SOLUTION INTRAVENOUS; SUBCUTANEOUS at 08:04

## 2025-06-01 RX ADMIN — APIXABAN 2.5 MILLIGRAM(S): 2.5 TABLET, FILM COATED ORAL at 06:16

## 2025-06-01 RX ADMIN — Medication 1 DOSE(S): at 06:15

## 2025-06-01 RX ADMIN — Medication 1 DOSE(S): at 17:55

## 2025-06-01 RX ADMIN — APIXABAN 2.5 MILLIGRAM(S): 2.5 TABLET, FILM COATED ORAL at 17:53

## 2025-06-01 RX ADMIN — Medication 667 MILLIGRAM(S): at 17:53

## 2025-06-01 RX ADMIN — FUROSEMIDE 40 MILLIGRAM(S): 10 INJECTION INTRAMUSCULAR; INTRAVENOUS at 06:16

## 2025-06-01 RX ADMIN — Medication 650 MILLIGRAM(S): at 23:25

## 2025-06-01 RX ADMIN — POLYETHYLENE GLYCOL 3350 17 GRAM(S): 17 POWDER, FOR SOLUTION ORAL at 17:55

## 2025-06-01 RX ADMIN — Medication 1 APPLICATION(S): at 12:24

## 2025-06-01 RX ADMIN — POLYETHYLENE GLYCOL 3350 17 GRAM(S): 17 POWDER, FOR SOLUTION ORAL at 06:16

## 2025-06-01 RX ADMIN — Medication 667 MILLIGRAM(S): at 12:22

## 2025-06-01 RX ADMIN — MIDODRINE HYDROCHLORIDE 15 MILLIGRAM(S): 5 TABLET ORAL at 12:23

## 2025-06-01 RX ADMIN — FOLIC ACID 1 MILLIGRAM(S): 1 TABLET ORAL at 12:23

## 2025-06-01 RX ADMIN — INSULIN LISPRO 2: 100 INJECTION, SOLUTION INTRAVENOUS; SUBCUTANEOUS at 11:36

## 2025-06-01 RX ADMIN — Medication 650 MILLIGRAM(S): at 22:25

## 2025-06-01 RX ADMIN — ATORVASTATIN CALCIUM 20 MILLIGRAM(S): 80 TABLET, FILM COATED ORAL at 22:26

## 2025-06-01 RX ADMIN — METOPROLOL SUCCINATE 12.5 MILLIGRAM(S): 50 TABLET, EXTENDED RELEASE ORAL at 06:16

## 2025-06-01 RX ADMIN — MIDODRINE HYDROCHLORIDE 15 MILLIGRAM(S): 5 TABLET ORAL at 06:16

## 2025-06-01 RX ADMIN — MIDODRINE HYDROCHLORIDE 15 MILLIGRAM(S): 5 TABLET ORAL at 17:53

## 2025-06-01 RX ADMIN — INSULIN LISPRO 2: 100 INJECTION, SOLUTION INTRAVENOUS; SUBCUTANEOUS at 16:43

## 2025-06-01 RX ADMIN — Medication 81 MILLIGRAM(S): at 12:23

## 2025-06-01 RX ADMIN — Medication 667 MILLIGRAM(S): at 08:01

## 2025-06-01 RX ADMIN — INSULIN GLARGINE-YFGN 10 UNIT(S): 100 INJECTION, SOLUTION SUBCUTANEOUS at 21:44

## 2025-06-01 RX ADMIN — INSULIN LISPRO 3 UNIT(S): 100 INJECTION, SOLUTION INTRAVENOUS; SUBCUTANEOUS at 16:43

## 2025-06-01 RX ADMIN — Medication 40 MILLIGRAM(S): at 06:16

## 2025-06-01 RX ADMIN — INSULIN LISPRO 3 UNIT(S): 100 INJECTION, SOLUTION INTRAVENOUS; SUBCUTANEOUS at 11:35

## 2025-06-01 RX ADMIN — INSULIN LISPRO 3 UNIT(S): 100 INJECTION, SOLUTION INTRAVENOUS; SUBCUTANEOUS at 08:03

## 2025-06-02 LAB
ANION GAP SERPL CALC-SCNC: 8 MMOL/L — SIGNIFICANT CHANGE UP (ref 5–17)
BUN SERPL-MCNC: 64 MG/DL — HIGH (ref 7–23)
CALCIUM SERPL-MCNC: 8.9 MG/DL — SIGNIFICANT CHANGE UP (ref 8.5–10.1)
CHLORIDE SERPL-SCNC: 100 MMOL/L — SIGNIFICANT CHANGE UP (ref 96–108)
CO2 SERPL-SCNC: 27 MMOL/L — SIGNIFICANT CHANGE UP (ref 22–31)
CREAT SERPL-MCNC: 3.99 MG/DL — HIGH (ref 0.5–1.3)
EGFR: 14 ML/MIN/1.73M2 — LOW
EGFR: 14 ML/MIN/1.73M2 — LOW
GLUCOSE BLDC GLUCOMTR-MCNC: 145 MG/DL — HIGH (ref 70–99)
GLUCOSE BLDC GLUCOMTR-MCNC: 146 MG/DL — HIGH (ref 70–99)
GLUCOSE BLDC GLUCOMTR-MCNC: 173 MG/DL — HIGH (ref 70–99)
GLUCOSE BLDC GLUCOMTR-MCNC: 185 MG/DL — HIGH (ref 70–99)
GLUCOSE BLDC GLUCOMTR-MCNC: 75 MG/DL — SIGNIFICANT CHANGE UP (ref 70–99)
GLUCOSE SERPL-MCNC: 142 MG/DL — HIGH (ref 70–99)
HCT VFR BLD CALC: 48.7 % — SIGNIFICANT CHANGE UP (ref 39–50)
HGB BLD-MCNC: 14.2 G/DL — SIGNIFICANT CHANGE UP (ref 13–17)
MCHC RBC-ENTMCNC: 28.6 PG — SIGNIFICANT CHANGE UP (ref 27–34)
MCHC RBC-ENTMCNC: 29.2 G/DL — LOW (ref 32–36)
MCV RBC AUTO: 98 FL — SIGNIFICANT CHANGE UP (ref 80–100)
NRBC BLD AUTO-RTO: 0 /100 WBCS — SIGNIFICANT CHANGE UP (ref 0–0)
PLATELET # BLD AUTO: 176 K/UL — SIGNIFICANT CHANGE UP (ref 150–400)
POTASSIUM SERPL-MCNC: 4.1 MMOL/L — SIGNIFICANT CHANGE UP (ref 3.5–5.3)
POTASSIUM SERPL-SCNC: 4.1 MMOL/L — SIGNIFICANT CHANGE UP (ref 3.5–5.3)
RBC # BLD: 4.97 M/UL — SIGNIFICANT CHANGE UP (ref 4.2–5.8)
RBC # FLD: 26.5 % — HIGH (ref 10.3–14.5)
SODIUM SERPL-SCNC: 135 MMOL/L — SIGNIFICANT CHANGE UP (ref 135–145)
WBC # BLD: 10.71 K/UL — HIGH (ref 3.8–10.5)
WBC # FLD AUTO: 10.71 K/UL — HIGH (ref 3.8–10.5)

## 2025-06-02 PROCEDURE — 99232 SBSQ HOSP IP/OBS MODERATE 35: CPT

## 2025-06-02 RX ADMIN — Medication 81 MILLIGRAM(S): at 11:35

## 2025-06-02 RX ADMIN — METOPROLOL SUCCINATE 12.5 MILLIGRAM(S): 50 TABLET, EXTENDED RELEASE ORAL at 06:27

## 2025-06-02 RX ADMIN — INSULIN LISPRO 1: 100 INJECTION, SOLUTION INTRAVENOUS; SUBCUTANEOUS at 09:12

## 2025-06-02 RX ADMIN — POLYETHYLENE GLYCOL 3350 17 GRAM(S): 17 POWDER, FOR SOLUTION ORAL at 18:09

## 2025-06-02 RX ADMIN — Medication 1 APPLICATION(S): at 11:42

## 2025-06-02 RX ADMIN — INSULIN GLARGINE-YFGN 10 UNIT(S): 100 INJECTION, SOLUTION SUBCUTANEOUS at 22:25

## 2025-06-02 RX ADMIN — MIDODRINE HYDROCHLORIDE 15 MILLIGRAM(S): 5 TABLET ORAL at 06:27

## 2025-06-02 RX ADMIN — Medication 667 MILLIGRAM(S): at 11:41

## 2025-06-02 RX ADMIN — Medication 100 MILLIGRAM(S): at 18:14

## 2025-06-02 RX ADMIN — APIXABAN 2.5 MILLIGRAM(S): 2.5 TABLET, FILM COATED ORAL at 06:26

## 2025-06-02 RX ADMIN — INSULIN LISPRO 3 UNIT(S): 100 INJECTION, SOLUTION INTRAVENOUS; SUBCUTANEOUS at 18:50

## 2025-06-02 RX ADMIN — Medication 1 DOSE(S): at 06:26

## 2025-06-02 RX ADMIN — Medication 667 MILLIGRAM(S): at 09:13

## 2025-06-02 RX ADMIN — TIOTROPIUM BROMIDE INHALATION SPRAY 2 PUFF(S): 3.12 SPRAY, METERED RESPIRATORY (INHALATION) at 18:15

## 2025-06-02 RX ADMIN — Medication 40 MILLIGRAM(S): at 06:26

## 2025-06-02 RX ADMIN — Medication 1 DOSE(S): at 18:16

## 2025-06-02 RX ADMIN — MIDODRINE HYDROCHLORIDE 15 MILLIGRAM(S): 5 TABLET ORAL at 11:35

## 2025-06-02 RX ADMIN — INSULIN LISPRO 3 UNIT(S): 100 INJECTION, SOLUTION INTRAVENOUS; SUBCUTANEOUS at 09:11

## 2025-06-02 RX ADMIN — Medication 667 MILLIGRAM(S): at 18:09

## 2025-06-02 RX ADMIN — FOLIC ACID 1 MILLIGRAM(S): 1 TABLET ORAL at 11:35

## 2025-06-02 RX ADMIN — MIDODRINE HYDROCHLORIDE 15 MILLIGRAM(S): 5 TABLET ORAL at 18:11

## 2025-06-02 RX ADMIN — ATORVASTATIN CALCIUM 20 MILLIGRAM(S): 80 TABLET, FILM COATED ORAL at 22:25

## 2025-06-02 RX ADMIN — POLYETHYLENE GLYCOL 3350 17 GRAM(S): 17 POWDER, FOR SOLUTION ORAL at 06:27

## 2025-06-03 LAB
GLUCOSE BLDC GLUCOMTR-MCNC: 139 MG/DL — HIGH (ref 70–99)
GLUCOSE BLDC GLUCOMTR-MCNC: 168 MG/DL — HIGH (ref 70–99)
GLUCOSE BLDC GLUCOMTR-MCNC: 209 MG/DL — HIGH (ref 70–99)
GLUCOSE BLDC GLUCOMTR-MCNC: 266 MG/DL — HIGH (ref 70–99)

## 2025-06-03 PROCEDURE — 49082 ABD PARACENTESIS: CPT

## 2025-06-03 PROCEDURE — 49083 ABD PARACENTESIS W/IMAGING: CPT

## 2025-06-03 PROCEDURE — 99231 SBSQ HOSP IP/OBS SF/LOW 25: CPT

## 2025-06-03 RX ORDER — ALBUMIN (HUMAN) 12.5 G/50ML
50 INJECTION, SOLUTION INTRAVENOUS ONCE
Refills: 0 | Status: DISCONTINUED | OUTPATIENT
Start: 2025-06-03 | End: 2025-06-04

## 2025-06-03 RX ADMIN — INSULIN LISPRO 2: 100 INJECTION, SOLUTION INTRAVENOUS; SUBCUTANEOUS at 09:44

## 2025-06-03 RX ADMIN — TIOTROPIUM BROMIDE INHALATION SPRAY 2 PUFF(S): 3.12 SPRAY, METERED RESPIRATORY (INHALATION) at 17:06

## 2025-06-03 RX ADMIN — Medication 1 DOSE(S): at 05:09

## 2025-06-03 RX ADMIN — Medication 667 MILLIGRAM(S): at 12:07

## 2025-06-03 RX ADMIN — FOLIC ACID 1 MILLIGRAM(S): 1 TABLET ORAL at 12:08

## 2025-06-03 RX ADMIN — INSULIN LISPRO 3 UNIT(S): 100 INJECTION, SOLUTION INTRAVENOUS; SUBCUTANEOUS at 09:43

## 2025-06-03 RX ADMIN — Medication 667 MILLIGRAM(S): at 09:47

## 2025-06-03 RX ADMIN — Medication 667 MILLIGRAM(S): at 17:05

## 2025-06-03 RX ADMIN — POLYETHYLENE GLYCOL 3350 17 GRAM(S): 17 POWDER, FOR SOLUTION ORAL at 05:08

## 2025-06-03 RX ADMIN — METOPROLOL SUCCINATE 12.5 MILLIGRAM(S): 50 TABLET, EXTENDED RELEASE ORAL at 05:08

## 2025-06-03 RX ADMIN — INSULIN LISPRO 3 UNIT(S): 100 INJECTION, SOLUTION INTRAVENOUS; SUBCUTANEOUS at 17:04

## 2025-06-03 RX ADMIN — MIDODRINE HYDROCHLORIDE 15 MILLIGRAM(S): 5 TABLET ORAL at 12:08

## 2025-06-03 RX ADMIN — Medication 81 MILLIGRAM(S): at 12:08

## 2025-06-03 RX ADMIN — Medication 40 MILLIGRAM(S): at 06:32

## 2025-06-03 RX ADMIN — MIDODRINE HYDROCHLORIDE 15 MILLIGRAM(S): 5 TABLET ORAL at 17:05

## 2025-06-03 RX ADMIN — INSULIN LISPRO 1: 100 INJECTION, SOLUTION INTRAVENOUS; SUBCUTANEOUS at 17:04

## 2025-06-03 RX ADMIN — INSULIN GLARGINE-YFGN 10 UNIT(S): 100 INJECTION, SOLUTION SUBCUTANEOUS at 22:38

## 2025-06-03 RX ADMIN — INSULIN LISPRO 3 UNIT(S): 100 INJECTION, SOLUTION INTRAVENOUS; SUBCUTANEOUS at 12:07

## 2025-06-03 RX ADMIN — ATORVASTATIN CALCIUM 20 MILLIGRAM(S): 80 TABLET, FILM COATED ORAL at 22:44

## 2025-06-03 RX ADMIN — MIDODRINE HYDROCHLORIDE 15 MILLIGRAM(S): 5 TABLET ORAL at 05:08

## 2025-06-03 RX ADMIN — Medication 1 DOSE(S): at 17:06

## 2025-06-03 RX ADMIN — INSULIN LISPRO 3: 100 INJECTION, SOLUTION INTRAVENOUS; SUBCUTANEOUS at 12:07

## 2025-06-03 RX ADMIN — Medication 1 APPLICATION(S): at 12:08

## 2025-06-04 LAB
GLUCOSE BLDC GLUCOMTR-MCNC: 100 MG/DL — HIGH (ref 70–99)
GLUCOSE BLDC GLUCOMTR-MCNC: 112 MG/DL — HIGH (ref 70–99)
GLUCOSE BLDC GLUCOMTR-MCNC: 150 MG/DL — HIGH (ref 70–99)
GLUCOSE BLDC GLUCOMTR-MCNC: 98 MG/DL — SIGNIFICANT CHANGE UP (ref 70–99)

## 2025-06-04 PROCEDURE — 99232 SBSQ HOSP IP/OBS MODERATE 35: CPT

## 2025-06-04 RX ORDER — APIXABAN 2.5 MG/1
2.5 TABLET, FILM COATED ORAL
Refills: 0 | Status: DISCONTINUED | OUTPATIENT
Start: 2025-06-04 | End: 2025-06-04

## 2025-06-04 RX ORDER — APIXABAN 2.5 MG/1
2.5 TABLET, FILM COATED ORAL EVERY 12 HOURS
Refills: 0 | Status: DISCONTINUED | OUTPATIENT
Start: 2025-06-04 | End: 2025-06-07

## 2025-06-04 RX ADMIN — Medication 667 MILLIGRAM(S): at 09:04

## 2025-06-04 RX ADMIN — Medication 1 DOSE(S): at 17:28

## 2025-06-04 RX ADMIN — FOLIC ACID 1 MILLIGRAM(S): 1 TABLET ORAL at 13:32

## 2025-06-04 RX ADMIN — Medication 667 MILLIGRAM(S): at 13:32

## 2025-06-04 RX ADMIN — Medication 1 APPLICATION(S): at 13:35

## 2025-06-04 RX ADMIN — INSULIN LISPRO 3 UNIT(S): 100 INJECTION, SOLUTION INTRAVENOUS; SUBCUTANEOUS at 13:30

## 2025-06-04 RX ADMIN — INSULIN LISPRO 3 UNIT(S): 100 INJECTION, SOLUTION INTRAVENOUS; SUBCUTANEOUS at 07:51

## 2025-06-04 RX ADMIN — Medication 100 MILLIGRAM(S): at 17:26

## 2025-06-04 RX ADMIN — ATORVASTATIN CALCIUM 20 MILLIGRAM(S): 80 TABLET, FILM COATED ORAL at 22:35

## 2025-06-04 RX ADMIN — INSULIN LISPRO 3 UNIT(S): 100 INJECTION, SOLUTION INTRAVENOUS; SUBCUTANEOUS at 17:12

## 2025-06-04 RX ADMIN — INSULIN GLARGINE-YFGN 10 UNIT(S): 100 INJECTION, SOLUTION SUBCUTANEOUS at 22:30

## 2025-06-04 RX ADMIN — Medication 40 MILLIGRAM(S): at 06:59

## 2025-06-04 RX ADMIN — Medication 81 MILLIGRAM(S): at 13:31

## 2025-06-04 RX ADMIN — MIDODRINE HYDROCHLORIDE 15 MILLIGRAM(S): 5 TABLET ORAL at 13:31

## 2025-06-04 RX ADMIN — MIDODRINE HYDROCHLORIDE 15 MILLIGRAM(S): 5 TABLET ORAL at 06:05

## 2025-06-04 RX ADMIN — MIDODRINE HYDROCHLORIDE 15 MILLIGRAM(S): 5 TABLET ORAL at 17:25

## 2025-06-04 RX ADMIN — Medication 667 MILLIGRAM(S): at 17:25

## 2025-06-04 RX ADMIN — Medication 1 DOSE(S): at 06:04

## 2025-06-04 RX ADMIN — TIOTROPIUM BROMIDE INHALATION SPRAY 2 PUFF(S): 3.12 SPRAY, METERED RESPIRATORY (INHALATION) at 13:32

## 2025-06-05 LAB
GLUCOSE BLDC GLUCOMTR-MCNC: 100 MG/DL — HIGH (ref 70–99)
GLUCOSE BLDC GLUCOMTR-MCNC: 106 MG/DL — HIGH (ref 70–99)
GLUCOSE BLDC GLUCOMTR-MCNC: 136 MG/DL — HIGH (ref 70–99)
GLUCOSE BLDC GLUCOMTR-MCNC: 186 MG/DL — HIGH (ref 70–99)

## 2025-06-05 PROCEDURE — 99232 SBSQ HOSP IP/OBS MODERATE 35: CPT

## 2025-06-05 RX ADMIN — INSULIN GLARGINE-YFGN 10 UNIT(S): 100 INJECTION, SOLUTION SUBCUTANEOUS at 21:45

## 2025-06-05 RX ADMIN — METOPROLOL SUCCINATE 12.5 MILLIGRAM(S): 50 TABLET, EXTENDED RELEASE ORAL at 17:50

## 2025-06-05 RX ADMIN — Medication 667 MILLIGRAM(S): at 17:51

## 2025-06-05 RX ADMIN — Medication 667 MILLIGRAM(S): at 11:59

## 2025-06-05 RX ADMIN — ATORVASTATIN CALCIUM 20 MILLIGRAM(S): 80 TABLET, FILM COATED ORAL at 22:35

## 2025-06-05 RX ADMIN — APIXABAN 2.5 MILLIGRAM(S): 2.5 TABLET, FILM COATED ORAL at 06:59

## 2025-06-05 RX ADMIN — Medication 40 MILLIGRAM(S): at 06:59

## 2025-06-05 RX ADMIN — Medication 1 DOSE(S): at 17:52

## 2025-06-05 RX ADMIN — MIDODRINE HYDROCHLORIDE 15 MILLIGRAM(S): 5 TABLET ORAL at 17:50

## 2025-06-05 RX ADMIN — INSULIN LISPRO 3 UNIT(S): 100 INJECTION, SOLUTION INTRAVENOUS; SUBCUTANEOUS at 11:55

## 2025-06-05 RX ADMIN — Medication 667 MILLIGRAM(S): at 08:42

## 2025-06-05 RX ADMIN — FOLIC ACID 1 MILLIGRAM(S): 1 TABLET ORAL at 11:59

## 2025-06-05 RX ADMIN — TIOTROPIUM BROMIDE INHALATION SPRAY 2 PUFF(S): 3.12 SPRAY, METERED RESPIRATORY (INHALATION) at 12:03

## 2025-06-05 RX ADMIN — INSULIN LISPRO 3 UNIT(S): 100 INJECTION, SOLUTION INTRAVENOUS; SUBCUTANEOUS at 16:26

## 2025-06-05 RX ADMIN — INSULIN LISPRO 3 UNIT(S): 100 INJECTION, SOLUTION INTRAVENOUS; SUBCUTANEOUS at 08:40

## 2025-06-05 RX ADMIN — Medication 1 APPLICATION(S): at 11:59

## 2025-06-05 RX ADMIN — APIXABAN 2.5 MILLIGRAM(S): 2.5 TABLET, FILM COATED ORAL at 17:51

## 2025-06-05 RX ADMIN — Medication 1 DOSE(S): at 06:57

## 2025-06-05 RX ADMIN — Medication 81 MILLIGRAM(S): at 11:59

## 2025-06-05 RX ADMIN — MIDODRINE HYDROCHLORIDE 15 MILLIGRAM(S): 5 TABLET ORAL at 12:00

## 2025-06-05 RX ADMIN — MIDODRINE HYDROCHLORIDE 15 MILLIGRAM(S): 5 TABLET ORAL at 06:59

## 2025-06-06 ENCOUNTER — TRANSCRIPTION ENCOUNTER (OUTPATIENT)
Age: 80
End: 2025-06-06

## 2025-06-06 LAB
ANION GAP SERPL CALC-SCNC: 5 MMOL/L — SIGNIFICANT CHANGE UP (ref 5–17)
BUN SERPL-MCNC: 47 MG/DL — HIGH (ref 7–23)
CALCIUM SERPL-MCNC: 8.1 MG/DL — LOW (ref 8.5–10.1)
CHLORIDE SERPL-SCNC: 103 MMOL/L — SIGNIFICANT CHANGE UP (ref 96–108)
CO2 SERPL-SCNC: 29 MMOL/L — SIGNIFICANT CHANGE UP (ref 22–31)
CREAT SERPL-MCNC: 3.25 MG/DL — HIGH (ref 0.5–1.3)
EGFR: 18 ML/MIN/1.73M2 — LOW
EGFR: 18 ML/MIN/1.73M2 — LOW
GLUCOSE BLDC GLUCOMTR-MCNC: 162 MG/DL — HIGH (ref 70–99)
GLUCOSE BLDC GLUCOMTR-MCNC: 174 MG/DL — HIGH (ref 70–99)
GLUCOSE BLDC GLUCOMTR-MCNC: 94 MG/DL — SIGNIFICANT CHANGE UP (ref 70–99)
GLUCOSE SERPL-MCNC: 191 MG/DL — HIGH (ref 70–99)
HCT VFR BLD CALC: 43.8 % — SIGNIFICANT CHANGE UP (ref 39–50)
HGB BLD-MCNC: 12.5 G/DL — LOW (ref 13–17)
MCHC RBC-ENTMCNC: 27.8 PG — SIGNIFICANT CHANGE UP (ref 27–34)
MCHC RBC-ENTMCNC: 28.5 G/DL — LOW (ref 32–36)
MCV RBC AUTO: 97.6 FL — SIGNIFICANT CHANGE UP (ref 80–100)
NRBC BLD AUTO-RTO: 0 /100 WBCS — SIGNIFICANT CHANGE UP (ref 0–0)
PLATELET # BLD AUTO: 170 K/UL — SIGNIFICANT CHANGE UP (ref 150–400)
POTASSIUM SERPL-MCNC: 3.9 MMOL/L — SIGNIFICANT CHANGE UP (ref 3.5–5.3)
POTASSIUM SERPL-SCNC: 3.9 MMOL/L — SIGNIFICANT CHANGE UP (ref 3.5–5.3)
RBC # BLD: 4.49 M/UL — SIGNIFICANT CHANGE UP (ref 4.2–5.8)
RBC # FLD: 25.2 % — HIGH (ref 10.3–14.5)
SODIUM SERPL-SCNC: 137 MMOL/L — SIGNIFICANT CHANGE UP (ref 135–145)
WBC # BLD: 11.98 K/UL — HIGH (ref 3.8–10.5)
WBC # FLD AUTO: 11.98 K/UL — HIGH (ref 3.8–10.5)

## 2025-06-06 PROCEDURE — 99232 SBSQ HOSP IP/OBS MODERATE 35: CPT

## 2025-06-06 RX ADMIN — Medication 1 APPLICATION(S): at 13:27

## 2025-06-06 RX ADMIN — Medication 1 DOSE(S): at 18:02

## 2025-06-06 RX ADMIN — Medication 667 MILLIGRAM(S): at 17:59

## 2025-06-06 RX ADMIN — Medication 667 MILLIGRAM(S): at 07:35

## 2025-06-06 RX ADMIN — Medication 40 MILLIGRAM(S): at 07:35

## 2025-06-06 RX ADMIN — APIXABAN 2.5 MILLIGRAM(S): 2.5 TABLET, FILM COATED ORAL at 05:56

## 2025-06-06 RX ADMIN — METOPROLOL SUCCINATE 12.5 MILLIGRAM(S): 50 TABLET, EXTENDED RELEASE ORAL at 05:56

## 2025-06-06 RX ADMIN — MIDODRINE HYDROCHLORIDE 15 MILLIGRAM(S): 5 TABLET ORAL at 05:56

## 2025-06-06 RX ADMIN — INSULIN LISPRO 3 UNIT(S): 100 INJECTION, SOLUTION INTRAVENOUS; SUBCUTANEOUS at 07:38

## 2025-06-06 RX ADMIN — ATORVASTATIN CALCIUM 20 MILLIGRAM(S): 80 TABLET, FILM COATED ORAL at 22:25

## 2025-06-06 RX ADMIN — INSULIN LISPRO 1: 100 INJECTION, SOLUTION INTRAVENOUS; SUBCUTANEOUS at 07:38

## 2025-06-06 RX ADMIN — INSULIN GLARGINE-YFGN 10 UNIT(S): 100 INJECTION, SOLUTION SUBCUTANEOUS at 22:06

## 2025-06-06 RX ADMIN — Medication 667 MILLIGRAM(S): at 13:27

## 2025-06-06 RX ADMIN — MIDODRINE HYDROCHLORIDE 15 MILLIGRAM(S): 5 TABLET ORAL at 13:27

## 2025-06-06 RX ADMIN — INSULIN LISPRO 3 UNIT(S): 100 INJECTION, SOLUTION INTRAVENOUS; SUBCUTANEOUS at 16:16

## 2025-06-06 RX ADMIN — Medication 100 MILLIGRAM(S): at 18:06

## 2025-06-06 RX ADMIN — FOLIC ACID 1 MILLIGRAM(S): 1 TABLET ORAL at 13:27

## 2025-06-06 RX ADMIN — INSULIN LISPRO 1: 100 INJECTION, SOLUTION INTRAVENOUS; SUBCUTANEOUS at 16:16

## 2025-06-06 RX ADMIN — POLYETHYLENE GLYCOL 3350 17 GRAM(S): 17 POWDER, FOR SOLUTION ORAL at 05:56

## 2025-06-06 RX ADMIN — APIXABAN 2.5 MILLIGRAM(S): 2.5 TABLET, FILM COATED ORAL at 18:00

## 2025-06-06 RX ADMIN — Medication 1 DOSE(S): at 05:55

## 2025-06-06 RX ADMIN — Medication 81 MILLIGRAM(S): at 13:26

## 2025-06-06 RX ADMIN — MIDODRINE HYDROCHLORIDE 15 MILLIGRAM(S): 5 TABLET ORAL at 17:59

## 2025-06-06 RX ADMIN — METOPROLOL SUCCINATE 12.5 MILLIGRAM(S): 50 TABLET, EXTENDED RELEASE ORAL at 17:59

## 2025-06-06 RX ADMIN — TIOTROPIUM BROMIDE INHALATION SPRAY 2 PUFF(S): 3.12 SPRAY, METERED RESPIRATORY (INHALATION) at 16:39

## 2025-06-06 NOTE — PROGRESS NOTE ADULT - ASSESSMENT
Interdisciplinary Discharge Planning Note    Anticipated Discharge Date:1-2 days    Anticipated Discharge Location: Home    Clinical Needs Before Discharge:  stable oxygen requirement and abx    Treatment Needs After Discharge:  None identified    Potential Barriers to Discharge: None Identified     J LUIS Jalloh  6/6/2025,  2:06 PM      80 y/o male w/ PMHx CAD s/p CABG, HF (combined HFrEF [EF 25%] and HFpEF [G3DD]) w/ ICD, AFib on Xarelto, severe AS s/p TAVR, COPD on 3-4L home O2, CKD4, HTN, HLD, T2DM, and BPH who presents as a transfer from [facility] for HF evaluation. Ongoing CHF exacerbation now off bumex gtt, new DENISHA on CKD likely 2/2 ongoing cardiorenal and possible component of abdominal compartment syndrome given tremendous ascites

## 2025-06-07 ENCOUNTER — TRANSCRIPTION ENCOUNTER (OUTPATIENT)
Age: 80
End: 2025-06-07

## 2025-06-07 VITALS
HEART RATE: 70 BPM | RESPIRATION RATE: 19 BRPM | SYSTOLIC BLOOD PRESSURE: 110 MMHG | TEMPERATURE: 99 F | DIASTOLIC BLOOD PRESSURE: 73 MMHG | OXYGEN SATURATION: 96 %

## 2025-06-07 DIAGNOSIS — I48.21 PERMANENT ATRIAL FIBRILLATION: ICD-10-CM

## 2025-06-07 LAB
GLUCOSE BLDC GLUCOMTR-MCNC: 117 MG/DL — HIGH (ref 70–99)
GLUCOSE BLDC GLUCOMTR-MCNC: 134 MG/DL — HIGH (ref 70–99)

## 2025-06-07 PROCEDURE — 99239 HOSP IP/OBS DSCHRG MGMT >30: CPT

## 2025-06-07 RX ADMIN — MIDODRINE HYDROCHLORIDE 15 MILLIGRAM(S): 5 TABLET ORAL at 12:03

## 2025-06-07 RX ADMIN — INSULIN LISPRO 3 UNIT(S): 100 INJECTION, SOLUTION INTRAVENOUS; SUBCUTANEOUS at 08:25

## 2025-06-07 RX ADMIN — Medication 667 MILLIGRAM(S): at 08:27

## 2025-06-07 RX ADMIN — Medication 81 MILLIGRAM(S): at 12:04

## 2025-06-07 RX ADMIN — Medication 40 MILLIGRAM(S): at 08:27

## 2025-06-07 RX ADMIN — MIDODRINE HYDROCHLORIDE 15 MILLIGRAM(S): 5 TABLET ORAL at 05:56

## 2025-06-07 RX ADMIN — APIXABAN 2.5 MILLIGRAM(S): 2.5 TABLET, FILM COATED ORAL at 18:48

## 2025-06-07 RX ADMIN — APIXABAN 2.5 MILLIGRAM(S): 2.5 TABLET, FILM COATED ORAL at 05:55

## 2025-06-07 RX ADMIN — MIDODRINE HYDROCHLORIDE 15 MILLIGRAM(S): 5 TABLET ORAL at 18:48

## 2025-06-07 RX ADMIN — Medication 667 MILLIGRAM(S): at 18:49

## 2025-06-07 RX ADMIN — TIOTROPIUM BROMIDE INHALATION SPRAY 2 PUFF(S): 3.12 SPRAY, METERED RESPIRATORY (INHALATION) at 12:46

## 2025-06-07 RX ADMIN — METOPROLOL SUCCINATE 12.5 MILLIGRAM(S): 50 TABLET, EXTENDED RELEASE ORAL at 18:51

## 2025-06-07 RX ADMIN — INSULIN LISPRO 3 UNIT(S): 100 INJECTION, SOLUTION INTRAVENOUS; SUBCUTANEOUS at 12:03

## 2025-06-07 RX ADMIN — Medication 1 APPLICATION(S): at 12:14

## 2025-06-07 RX ADMIN — Medication 1 DOSE(S): at 05:55

## 2025-06-07 RX ADMIN — Medication 667 MILLIGRAM(S): at 12:05

## 2025-06-07 RX ADMIN — FOLIC ACID 1 MILLIGRAM(S): 1 TABLET ORAL at 12:05

## 2025-06-07 RX ADMIN — Medication 1 DOSE(S): at 18:51

## 2025-06-08 RX ORDER — INSULIN LISPRO 100 U/ML
3 INJECTION, SOLUTION INTRAVENOUS; SUBCUTANEOUS
Qty: 5 | Refills: 5
Start: 2025-06-08

## 2025-06-08 RX ORDER — PSYLLIUM SEED (WITH DEXTROSE)
1 POWDER (GRAM) ORAL
Qty: 90 | Refills: 0
Start: 2025-06-08 | End: 2025-09-05

## 2025-06-08 RX ORDER — MIDODRINE HYDROCHLORIDE 5 MG/1
1 TABLET ORAL
Qty: 270 | Refills: 0
Start: 2025-06-08 | End: 2025-09-05

## 2025-06-08 RX ORDER — TAMSULOSIN HYDROCHLORIDE 0.4 MG/1
1 CAPSULE ORAL
Qty: 90 | Refills: 0
Start: 2025-06-08 | End: 2025-09-05

## 2025-06-08 RX ORDER — ISOPROPYL ALCOHOL, BENZOCAINE .7; .06 ML/ML; ML/ML
0 SWAB TOPICAL
Qty: 100 | Refills: 1
Start: 2025-06-08

## 2025-06-08 RX ORDER — MIDODRINE HYDROCHLORIDE 5 MG/1
1 TABLET ORAL
Refills: 0 | DISCHARGE

## 2025-06-08 RX ORDER — IPRATROPIUM BROMIDE AND ALBUTEROL SULFATE .5; 2.5 MG/3ML; MG/3ML
3 SOLUTION RESPIRATORY (INHALATION)
Qty: 1080 | Refills: 0
Start: 2025-06-08 | End: 2025-09-05

## 2025-06-08 RX ORDER — INSULIN GLARGINE-YFGN 100 [IU]/ML
18 INJECTION, SOLUTION SUBCUTANEOUS
Qty: 5 | Refills: 0
Start: 2025-06-08

## 2025-06-08 RX ORDER — SACUBITRIL AND VALSARTAN 6; 6 MG/1; MG/1
1 PELLET ORAL
Qty: 180 | Refills: 0
Start: 2025-06-08 | End: 2025-09-05

## 2025-06-08 RX ORDER — BUMETANIDE 1 MG/1
2 TABLET ORAL
Qty: 360 | Refills: 0
Start: 2025-06-08 | End: 2025-09-05

## 2025-06-08 RX ORDER — SIMETHICONE 80 MG
1 TABLET,CHEWABLE ORAL
Qty: 270 | Refills: 0
Start: 2025-06-08 | End: 2025-09-05

## 2025-06-08 RX ORDER — POLYETHYLENE GLYCOL 3350 17 G/17G
17 POWDER, FOR SOLUTION ORAL
Qty: 3060 | Refills: 0
Start: 2025-06-08 | End: 2025-09-05

## 2025-06-08 RX ORDER — ASPIRIN 325 MG
1 TABLET ORAL
Qty: 90 | Refills: 0
Start: 2025-06-08 | End: 2025-09-05

## 2025-06-08 RX ORDER — BUDESONIDE AND FORMOTEROL FUMARATE DIHYDRATE 80; 4.5 UG/1; UG/1
2 AEROSOL RESPIRATORY (INHALATION)
Qty: 1 | Refills: 0
Start: 2025-06-08

## 2025-06-08 RX ORDER — FOLIC ACID 1 MG/1
1 TABLET ORAL
Qty: 90 | Refills: 0
Start: 2025-06-08 | End: 2025-09-05

## 2025-06-08 RX ORDER — APIXABAN 2.5 MG/1
1 TABLET, FILM COATED ORAL
Qty: 180 | Refills: 0
Start: 2025-06-08 | End: 2025-09-05

## 2025-06-10 DIAGNOSIS — Z99.81 DEPENDENCE ON SUPPLEMENTAL OXYGEN: ICD-10-CM

## 2025-06-10 DIAGNOSIS — J44.9 CHRONIC OBSTRUCTIVE PULMONARY DISEASE, UNSPECIFIED: ICD-10-CM

## 2025-06-10 DIAGNOSIS — Z79.01 LONG TERM (CURRENT) USE OF ANTICOAGULANTS: ICD-10-CM

## 2025-06-10 DIAGNOSIS — I50.43 ACUTE ON CHRONIC COMBINED SYSTOLIC (CONGESTIVE) AND DIASTOLIC (CONGESTIVE) HEART FAILURE: ICD-10-CM

## 2025-06-10 DIAGNOSIS — Z99.2 DEPENDENCE ON RENAL DIALYSIS: ICD-10-CM

## 2025-06-10 DIAGNOSIS — I25.10 ATHEROSCLEROTIC HEART DISEASE OF NATIVE CORONARY ARTERY WITHOUT ANGINA PECTORIS: ICD-10-CM

## 2025-06-10 DIAGNOSIS — N18.6 END STAGE RENAL DISEASE: ICD-10-CM

## 2025-06-10 DIAGNOSIS — E87.70 FLUID OVERLOAD, UNSPECIFIED: ICD-10-CM

## 2025-06-10 DIAGNOSIS — R18.8 OTHER ASCITES: ICD-10-CM

## 2025-06-10 DIAGNOSIS — N40.0 BENIGN PROSTATIC HYPERPLASIA WITHOUT LOWER URINARY TRACT SYMPTOMS: ICD-10-CM

## 2025-06-10 DIAGNOSIS — Z95.1 PRESENCE OF AORTOCORONARY BYPASS GRAFT: ICD-10-CM

## 2025-06-10 DIAGNOSIS — I48.20 CHRONIC ATRIAL FIBRILLATION, UNSPECIFIED: ICD-10-CM

## 2025-06-10 DIAGNOSIS — E11.22 TYPE 2 DIABETES MELLITUS WITH DIABETIC CHRONIC KIDNEY DISEASE: ICD-10-CM

## 2025-06-10 DIAGNOSIS — L03.116 CELLULITIS OF LEFT LOWER LIMB: ICD-10-CM

## 2025-06-10 DIAGNOSIS — E78.5 HYPERLIPIDEMIA, UNSPECIFIED: ICD-10-CM

## 2025-06-10 DIAGNOSIS — Z66 DO NOT RESUSCITATE: ICD-10-CM

## 2025-06-10 DIAGNOSIS — E11.649 TYPE 2 DIABETES MELLITUS WITH HYPOGLYCEMIA WITHOUT COMA: ICD-10-CM

## 2025-06-10 DIAGNOSIS — I95.9 HYPOTENSION, UNSPECIFIED: ICD-10-CM

## 2025-06-10 DIAGNOSIS — Z79.4 LONG TERM (CURRENT) USE OF INSULIN: ICD-10-CM

## 2025-06-10 DIAGNOSIS — I13.2 HYPERTENSIVE HEART AND CHRONIC KIDNEY DISEASE WITH HEART FAILURE AND WITH STAGE 5 CHRONIC KIDNEY DISEASE, OR END STAGE RENAL DISEASE: ICD-10-CM

## 2025-06-11 NOTE — ED ADULT TRIAGE NOTE - PAIN: PRESENCE, MLM
denies pain/discomfort Quality 226: Preventive Care And Screening: Tobacco Use: Screening And Cessation Intervention: Patient screened for tobacco use and is an ex/non-smoker Detail Level: Detailed

## 2025-06-17 ENCOUNTER — APPOINTMENT (OUTPATIENT)
Dept: CARE COORDINATION | Facility: HOME HEALTH | Age: 80
End: 2025-06-17
Payer: MEDICARE

## 2025-06-17 VITALS — TEMPERATURE: 97.2 F | HEART RATE: 68 BPM | SYSTOLIC BLOOD PRESSURE: 104 MMHG | DIASTOLIC BLOOD PRESSURE: 62 MMHG

## 2025-06-17 PROCEDURE — 99349 HOME/RES VST EST MOD MDM 40: CPT | Mod: 2W

## 2025-06-20 RX ORDER — LORATADINE 5 MG
17 TABLET,CHEWABLE ORAL DAILY
Qty: 1 | Refills: 0 | Status: ACTIVE | COMMUNITY
Start: 2025-06-17

## 2025-06-20 RX ORDER — BUMETANIDE 1 MG/1
1 TABLET ORAL DAILY
Refills: 0 | Status: ACTIVE | COMMUNITY
Start: 2025-06-17

## 2025-06-20 RX ORDER — SIMETHICONE 80 MG/1
80 TABLET, CHEWABLE ORAL EVERY 8 HOURS
Refills: 0 | Status: ACTIVE | COMMUNITY
Start: 2025-06-17

## 2025-06-20 RX ORDER — GLUCOSAMINE HCL/CHONDROITIN SU 500-400 MG
3 CAPSULE ORAL
Qty: 30 | Refills: 0 | Status: ACTIVE | COMMUNITY
Start: 2025-06-17 | End: 2025-07-17

## 2025-06-20 RX ORDER — ASPIRIN 81 MG/1
81 TABLET, COATED ORAL
Refills: 0 | Status: ACTIVE | COMMUNITY
Start: 2025-06-17

## 2025-06-20 RX ORDER — ALLOPURINOL 100 MG/1
100 TABLET ORAL
Qty: 1 | Refills: 3 | Status: ACTIVE | COMMUNITY
Start: 2025-06-17

## 2025-06-20 RX ORDER — IPRATROPIUM BROMIDE AND ALBUTEROL SULFATE 2.5; .5 MG/3ML; MG/3ML
0.5-2.5 (3) SOLUTION RESPIRATORY (INHALATION) EVERY 6 HOURS
Qty: 1 | Refills: 0 | Status: ACTIVE | COMMUNITY
Start: 2025-06-17

## 2025-06-20 RX ORDER — APIXABAN 2.5 MG/1
2.5 TABLET, FILM COATED ORAL
Qty: 60 | Refills: 3 | Status: ACTIVE | COMMUNITY
Start: 2025-06-17

## 2025-06-20 RX ORDER — TAMSULOSIN HYDROCHLORIDE 0.4 MG/1
0.4 CAPSULE ORAL
Qty: 1 | Refills: 1 | Status: ACTIVE | COMMUNITY
Start: 2025-06-17

## 2025-06-20 RX ORDER — MIDODRINE HYDROCHLORIDE 10 MG/1
10 TABLET ORAL 3 TIMES DAILY
Refills: 0 | Status: ACTIVE | COMMUNITY
Start: 2025-06-17

## 2025-06-20 RX ORDER — FOLIC ACID 1 MG/1
1 TABLET ORAL DAILY
Qty: 30 | Refills: 0 | Status: ACTIVE | COMMUNITY
Start: 2025-06-17

## 2025-06-20 RX ORDER — PSYLLIUM HUSK 100 %
POWDER (GRAM) MISCELLANEOUS
Refills: 0 | Status: ACTIVE | COMMUNITY
Start: 2025-06-17

## 2025-06-20 RX ORDER — INSULIN GLARGINE 100 [IU]/ML
100 INJECTION, SOLUTION SUBCUTANEOUS
Refills: 0 | Status: ACTIVE | COMMUNITY
Start: 2025-06-17

## 2025-06-20 RX ORDER — ISOPROPYL ALCOHOL 0.7 ML/ML
SWAB TOPICAL
Qty: 1 | Refills: 0 | Status: ACTIVE | COMMUNITY
Start: 2025-06-17

## 2025-06-20 RX ORDER — EPOETIN ALFA 10000 [IU]/ML
10000 SOLUTION INTRAVENOUS; SUBCUTANEOUS
Refills: 0 | Status: ACTIVE | COMMUNITY
Start: 2025-06-17

## 2025-06-20 RX ORDER — PANTOPRAZOLE 40 MG/1
40 TABLET, DELAYED RELEASE ORAL DAILY
Qty: 30 | Refills: 1 | Status: ACTIVE | COMMUNITY
Start: 2025-06-17

## 2025-06-20 RX ORDER — INSULIN LISPRO 100 U/ML
100 INJECTION, SOLUTION SUBCUTANEOUS 3 TIMES DAILY
Refills: 0 | Status: ACTIVE | COMMUNITY
Start: 2025-06-17

## 2025-06-20 RX ORDER — BISACODYL 5 MG/1
5 TABLET ORAL
Refills: 0 | Status: ACTIVE | COMMUNITY
Start: 2025-06-17

## 2025-06-20 RX ORDER — BUDESONIDE AND FORMOTEROL FUMARATE DIHYDRATE 160; 4.5 UG/1; UG/1
160-4.5 AEROSOL RESPIRATORY (INHALATION) DAILY
Refills: 0 | Status: ACTIVE | COMMUNITY
Start: 2025-06-17

## 2025-06-25 ENCOUNTER — NON-APPOINTMENT (OUTPATIENT)
Age: 80
End: 2025-06-25

## 2025-06-25 RX ORDER — CALCIUM ACETATE 667 MG
667 TABLET ORAL
Qty: 90 | Refills: 1 | Status: ACTIVE | COMMUNITY
Start: 2025-06-17 | End: 1900-01-01

## 2025-06-25 RX ORDER — METOPROLOL SUCCINATE 25 MG/1
25 TABLET, EXTENDED RELEASE ORAL
Qty: 30 | Refills: 2 | Status: ACTIVE | COMMUNITY
Start: 2025-06-17 | End: 1900-01-01

## 2025-06-25 RX ORDER — SACUBITRIL AND VALSARTAN 24; 26 MG/1; MG/1
24-26 TABLET, FILM COATED ORAL
Qty: 60 | Refills: 1 | Status: ACTIVE | COMMUNITY
Start: 2025-06-17 | End: 1900-01-01

## 2025-06-25 RX ORDER — METOLAZONE 10 MG/1
10 TABLET ORAL DAILY
Qty: 30 | Refills: 1 | Status: ACTIVE | COMMUNITY
Start: 2025-06-17 | End: 1900-01-01

## 2025-06-25 RX ORDER — TIOTROPIUM BROMIDE INHALATION SPRAY 3.12 UG/1
2.5 SPRAY, METERED RESPIRATORY (INHALATION) DAILY
Qty: 1 | Refills: 1 | Status: ACTIVE | COMMUNITY
Start: 2025-06-17 | End: 1900-01-01